# Patient Record
Sex: MALE | Race: WHITE | NOT HISPANIC OR LATINO | Employment: OTHER | URBAN - METROPOLITAN AREA
[De-identification: names, ages, dates, MRNs, and addresses within clinical notes are randomized per-mention and may not be internally consistent; named-entity substitution may affect disease eponyms.]

---

## 2017-01-04 ENCOUNTER — HOSPITAL ENCOUNTER (OUTPATIENT)
Dept: CARDIOLOGY | Facility: HOSPITAL | Age: 82
Setting detail: RECURRING SERIES
Discharge: HOME | End: 2017-01-04

## 2017-01-04 PROCEDURE — 85610 PROTHROMBIN TIME: CPT | Performed by: INTERNAL MEDICINE

## 2017-01-04 PROCEDURE — 36416 COLLJ CAPILLARY BLOOD SPEC: CPT | Performed by: INTERNAL MEDICINE

## 2017-01-24 RX ORDER — ICOSAPENT ETHYL 1000 MG/1
CAPSULE ORAL
Qty: 120 CAPSULE | Refills: 2 | Status: SHIPPED | OUTPATIENT
Start: 2017-01-24 | End: 2017-03-07

## 2017-01-26 ENCOUNTER — TELEPHONE (OUTPATIENT)
Dept: FAMILY MEDICINE CLINIC | Facility: CLINIC | Age: 82
End: 2017-01-26

## 2017-01-26 RX ORDER — METOPROLOL SUCCINATE 25 MG/1
TABLET, EXTENDED RELEASE ORAL
Qty: 30 TABLET | Refills: 3 | Status: SHIPPED | OUTPATIENT
Start: 2017-01-26 | End: 2017-03-27 | Stop reason: SDUPTHER

## 2017-01-26 NOTE — TELEPHONE ENCOUNTER
Patients wife called and said that the vascepa patient was prescribed was $182 he is requesting something else that is cheaper

## 2017-02-13 ENCOUNTER — HOSPITAL ENCOUNTER (OUTPATIENT)
Dept: CARDIOLOGY | Facility: HOSPITAL | Age: 82
Setting detail: RECURRING SERIES
Discharge: HOME | End: 2017-02-13

## 2017-02-13 PROCEDURE — 85610 PROTHROMBIN TIME: CPT

## 2017-02-13 PROCEDURE — 36416 COLLJ CAPILLARY BLOOD SPEC: CPT

## 2017-02-20 RX ORDER — ATORVASTATIN CALCIUM 20 MG/1
TABLET, FILM COATED ORAL
Qty: 90 TABLET | Refills: 0 | Status: SHIPPED | OUTPATIENT
Start: 2017-02-20 | End: 2017-05-19 | Stop reason: SDUPTHER

## 2017-02-22 ENCOUNTER — HOSPITAL ENCOUNTER (OUTPATIENT)
Dept: CARDIOLOGY | Facility: HOSPITAL | Age: 82
Setting detail: RECURRING SERIES
Discharge: HOME | End: 2017-02-22

## 2017-02-22 ENCOUNTER — OFFICE VISIT (OUTPATIENT)
Dept: CARDIOLOGY | Facility: CLINIC | Age: 82
End: 2017-02-22

## 2017-02-22 VITALS
BODY MASS INDEX: 25.34 KG/M2 | RESPIRATION RATE: 20 BRPM | SYSTOLIC BLOOD PRESSURE: 154 MMHG | HEART RATE: 81 BPM | HEIGHT: 71 IN | DIASTOLIC BLOOD PRESSURE: 84 MMHG | WEIGHT: 181 LBS

## 2017-02-22 DIAGNOSIS — I48.20 CHRONIC ATRIAL FIBRILLATION (HCC): Primary | ICD-10-CM

## 2017-02-22 PROCEDURE — 36416 COLLJ CAPILLARY BLOOD SPEC: CPT

## 2017-02-22 PROCEDURE — 93000 ELECTROCARDIOGRAM COMPLETE: CPT | Performed by: INTERNAL MEDICINE

## 2017-02-22 PROCEDURE — 85610 PROTHROMBIN TIME: CPT

## 2017-02-22 PROCEDURE — 99214 OFFICE O/P EST MOD 30 MIN: CPT | Performed by: INTERNAL MEDICINE

## 2017-02-27 RX ORDER — SUB-Q INSULIN DEVICE, 40 UNIT
EACH MISCELLANEOUS
Qty: 1 KIT | Refills: 5 | Status: SHIPPED | OUTPATIENT
Start: 2017-02-27 | End: 2017-04-21 | Stop reason: CLARIF

## 2017-03-01 NOTE — PROGRESS NOTES
Subjective:     Encounter Date:02/22/2017      Patient ID: Betito Sequeira is a 81 y.o. male.    Chief Complaint:  Atrial Fibrillation   Presents for follow-up visit. Symptoms include palpitations. Symptoms are negative for bradycardia, chest pain, hypertension, hypotension, shortness of breath, syncope, tachycardia and weakness. The symptoms have been stable. Past medical history includes atrial fibrillation.   Hypertension   This is a chronic problem. The current episode started more than 1 year ago. The problem is controlled. Associated symptoms include palpitations. Pertinent negatives include no anxiety, chest pain, peripheral edema, PND or shortness of breath.     Patient resents today for reevaluation.  All in all he is actually doing relatively well.  No complaints    Review of Systems   Constitution: Negative for weakness.   Cardiovascular: Positive for palpitations. Negative for chest pain, paroxysmal nocturnal dyspnea and syncope.   Respiratory: Negative for shortness of breath.    All other systems reviewed and are negative.        ECG 12 Lead  Date/Time: 2/22/2017 3:35 PM  Performed by: TANVI PAUL  Authorized by: TANVI PAUL   Comparison: compared with previous ECG from 2/17/2016  Similar to previous ECG  Rhythm: atrial fibrillation  Clinical impression: abnormal ECG               Objective:     Physical Exam   Constitutional: He is oriented to person, place, and time. He appears well-developed.   HENT:   Head: Normocephalic.   Eyes: Conjunctivae are normal.   Neck: Normal range of motion.   Cardiovascular: Normal rate and normal heart sounds.  An irregularly irregular rhythm present.   Metal click noted   Pulmonary/Chest: Breath sounds normal.   Abdominal: Soft. Bowel sounds are normal.   Musculoskeletal: Normal range of motion. He exhibits no edema.   Neurological: He is alert and oriented to person, place, and time.   Skin: Skin is warm and dry.   Psychiatric: He has a normal mood  and affect. His behavior is normal.   Vitals reviewed.      Lab Review:       Assessment:         No diagnosis found.       Plan:          1. History of aortic valve replacement with a metal valve as well as an aortic root construction. His valve sounds good today. His last echocardiogram was in 02/2013.  We discussed the fact that we probably need to check his echo in the next year or so.  2. History of chronic atrial fibrillation. He remains in it. His heart rate is well  controlled. He is anticoagulated with Coumadin because of his valve.   3. History of hypertension. His blood pressure was slightly elevated.  I did tell him to follow closely report back to me.. The patient was told to follow up in approximately a year or sooner, of course, if there are issues or his blood pressure doesn't improve..   4.  History of diabetes Complicating all above.  Atrial Fibrillation and Atrial Flutter  Assessment  • The patient has permanent atrial fibrillation  • This is valvular in etiology  • The patient's CHADS2-VASc score is 4  • A [id number] score of 2 or more is considered a high risk for a thromboembolic event  • Warfarin prescribed    Plan  • Continue in atrial fibrillation with rate control  • Continue warfarin for antithrombotic therapy, bleeding issues discussed

## 2017-03-07 ENCOUNTER — OFFICE VISIT (OUTPATIENT)
Dept: FAMILY MEDICINE CLINIC | Facility: CLINIC | Age: 82
End: 2017-03-07

## 2017-03-07 ENCOUNTER — HOSPITAL ENCOUNTER (OUTPATIENT)
Dept: CARDIOLOGY | Facility: HOSPITAL | Age: 82
Setting detail: RECURRING SERIES
Discharge: HOME | End: 2017-03-07

## 2017-03-07 VITALS
WEIGHT: 181 LBS | DIASTOLIC BLOOD PRESSURE: 74 MMHG | TEMPERATURE: 97.4 F | HEART RATE: 74 BPM | SYSTOLIC BLOOD PRESSURE: 124 MMHG | HEIGHT: 71 IN | BODY MASS INDEX: 25.34 KG/M2 | OXYGEN SATURATION: 98 %

## 2017-03-07 DIAGNOSIS — I10 ESSENTIAL HYPERTENSION: ICD-10-CM

## 2017-03-07 DIAGNOSIS — IMO0002 UNCONTROLLED TYPE 2 DIABETES MELLITUS WITH COMPLICATION, WITH LONG-TERM CURRENT USE OF INSULIN: Primary | ICD-10-CM

## 2017-03-07 DIAGNOSIS — E78.5 HYPERLIPIDEMIA, UNSPECIFIED HYPERLIPIDEMIA TYPE: ICD-10-CM

## 2017-03-07 DIAGNOSIS — N42.9 PROSTATE DISORDER: ICD-10-CM

## 2017-03-07 LAB — HBA1C MFR BLD: 9.2 %

## 2017-03-07 PROCEDURE — 99214 OFFICE O/P EST MOD 30 MIN: CPT | Performed by: NURSE PRACTITIONER

## 2017-03-07 PROCEDURE — 36416 COLLJ CAPILLARY BLOOD SPEC: CPT

## 2017-03-07 PROCEDURE — 85610 PROTHROMBIN TIME: CPT

## 2017-03-07 PROCEDURE — 83036 HEMOGLOBIN GLYCOSYLATED A1C: CPT | Performed by: NURSE PRACTITIONER

## 2017-03-07 RX ORDER — CHLORAL HYDRATE 500 MG
4000 CAPSULE ORAL
COMMUNITY
End: 2022-05-18 | Stop reason: HOSPADM

## 2017-03-07 NOTE — PROGRESS NOTES
"Subjective   Betito Sequeira is a 81 y.o. male.     History of Present Illness   Betito Sequeira 81 y.o. male who presents today for routine follow up check and medication refills.  he has a history of   Patient Active Problem List   Diagnosis   • Atrial fibrillation   • Hypertension   • Cardiomyopathy   • Atopic rhinitis   • Uncontrolled type 2 diabetes mellitus   • Gastroesophageal reflux disease   • Hyperlipidemia   .  Since the last visit, he has overall felt well.  He has Hypertenision and is well controlled on medication, DMII and is here to discuss recent abnormal labs and needing labs rechecked for hyperlipidemia.  he has been compliant with current medications have reviewed them.  The patient denies medication side effects.    Patient brought in glucose readings from home and they are all very well controlled high as all nares 157.  Patient's A1c today is 9.2 believe that he's having spikes during the night.  He is currently doing his clicks with every meal and also using 30 units of Tresiba every night.  He is continuing to snack more frequently than he should then he's not doing any exercise at all.    The following portions of the patient's history were reviewed and updated as appropriate: allergies, current medications, past social history and problem list.    Review of Systems   All other systems reviewed and are negative.      Objective   Visit Vitals   • /74 (BP Location: Left arm, Patient Position: Sitting)   • Pulse 74   • Temp 97.4 °F (36.3 °C)   • Ht 71\" (180.3 cm)   • Wt 181 lb (82.1 kg)   • SpO2 98%   • BMI 25.24 kg/m2     Physical Exam   Constitutional: He is oriented to person, place, and time. Vital signs are normal. He appears well-developed and well-nourished. No distress.   HENT:   Head: Normocephalic.   Cardiovascular: Normal rate, regular rhythm and normal heart sounds.    Pulmonary/Chest: Effort normal and breath sounds normal.   Neurological: He is alert and oriented to person, " place, and time. Gait normal.   Psychiatric: He has a normal mood and affect. His behavior is normal. Judgment and thought content normal.   Vitals reviewed.      Assessment/Plan   Problem List Items Addressed This Visit        Cardiovascular and Mediastinum    Hypertension    Relevant Orders    MicroAlbumin, Urine, Random    Hyperlipidemia    Relevant Orders    CBC & Differential    Comprehensive Metabolic Panel    Lipid Panel With LDL / HDL Ratio       Endocrine    Uncontrolled type 2 diabetes mellitus - Primary    Relevant Orders    POC Glycosylated Hemoglobin (Hb A1C)    C-Peptide      Other Visit Diagnoses     Prostate disorder        Relevant Orders    PSA           increase Tresiba to 40units a night  Cont after labs   recheck a C-peptide today along with some other labs depending upon the C-peptide results I will either add back in oral diabetic medications or send him to an endocrinologist.  I can't seem to get him to understand the severity of his diabetes and the need to get his A1c down to 7.  His wife is in the office visit with him and she understands but she works all day and he is home alone therefore not eating well and not exercising as he should be.   Call [phone] with results

## 2017-03-08 LAB
ALBUMIN SERPL-MCNC: 4 G/DL (ref 3.5–5.2)
ALBUMIN/GLOB SERPL: 1.4 G/DL
ALP SERPL-CCNC: 99 U/L (ref 39–117)
ALT SERPL-CCNC: 37 U/L (ref 1–41)
AST SERPL-CCNC: 23 U/L (ref 1–40)
BASOPHILS # BLD AUTO: 0.02 10*3/MM3 (ref 0–0.2)
BASOPHILS NFR BLD AUTO: 0.4 % (ref 0–1.5)
BILIRUB SERPL-MCNC: 0.6 MG/DL (ref 0.1–1.2)
BUN SERPL-MCNC: 26 MG/DL (ref 8–23)
BUN/CREAT SERPL: 16.8 (ref 7–25)
C PEPTIDE SERPL-MCNC: 1.3 NG/ML (ref 1.1–4.4)
CALCIUM SERPL-MCNC: 9.5 MG/DL (ref 8.6–10.5)
CHLORIDE SERPL-SCNC: 103 MMOL/L (ref 98–107)
CHOLEST SERPL-MCNC: 167 MG/DL (ref 0–200)
CO2 SERPL-SCNC: 25.1 MMOL/L (ref 22–29)
CREAT SERPL-MCNC: 1.55 MG/DL (ref 0.76–1.27)
EOSINOPHIL # BLD AUTO: 0.13 10*3/MM3 (ref 0–0.7)
EOSINOPHIL NFR BLD AUTO: 2.8 % (ref 0.3–6.2)
ERYTHROCYTE [DISTWIDTH] IN BLOOD BY AUTOMATED COUNT: 15.6 % (ref 11.5–14.5)
GLOBULIN SER CALC-MCNC: 2.8 GM/DL
GLUCOSE SERPL-MCNC: 102 MG/DL (ref 65–99)
HCT VFR BLD AUTO: 47.9 % (ref 40.4–52.2)
HDLC SERPL-MCNC: 34 MG/DL (ref 40–60)
HGB BLD-MCNC: 15.4 G/DL (ref 13.7–17.6)
IMM GRANULOCYTES # BLD: 0 10*3/MM3 (ref 0–0.03)
IMM GRANULOCYTES NFR BLD: 0 % (ref 0–0.5)
LDLC SERPL CALC-MCNC: 97 MG/DL (ref 0–100)
LDLC/HDLC SERPL: 2.86 {RATIO}
LYMPHOCYTES # BLD AUTO: 1.11 10*3/MM3 (ref 0.9–4.8)
LYMPHOCYTES NFR BLD AUTO: 23.5 % (ref 19.6–45.3)
MCH RBC QN AUTO: 27.4 PG (ref 27–32.7)
MCHC RBC AUTO-ENTMCNC: 32.2 G/DL (ref 32.6–36.4)
MCV RBC AUTO: 85.1 FL (ref 79.8–96.2)
MICROALBUMIN UR-MCNC: 77.6 UG/ML
MONOCYTES # BLD AUTO: 0.28 10*3/MM3 (ref 0.2–1.2)
MONOCYTES NFR BLD AUTO: 5.9 % (ref 5–12)
NEUTROPHILS # BLD AUTO: 3.18 10*3/MM3 (ref 1.9–8.1)
NEUTROPHILS NFR BLD AUTO: 67.4 % (ref 42.7–76)
NRBC BLD AUTO-RTO: 0 /100 WBC (ref 0–0)
PLATELET # BLD AUTO: 103 10*3/MM3 (ref 140–500)
POTASSIUM SERPL-SCNC: 4.4 MMOL/L (ref 3.5–5.2)
PROT SERPL-MCNC: 6.8 G/DL (ref 6–8.5)
PSA SERPL-MCNC: 0.05 NG/ML (ref 0–4)
RBC # BLD AUTO: 5.63 10*6/MM3 (ref 4.6–6)
SODIUM SERPL-SCNC: 143 MMOL/L (ref 136–145)
TRIGL SERPL-MCNC: 178 MG/DL (ref 0–150)
VLDLC SERPL CALC-MCNC: 35.6 MG/DL (ref 5–40)
WBC # BLD AUTO: 4.72 10*3/MM3 (ref 4.5–10.7)

## 2017-03-09 DIAGNOSIS — IMO0002 UNCONTROLLED TYPE 2 DIABETES MELLITUS WITH COMPLICATION, WITH LONG-TERM CURRENT USE OF INSULIN: Primary | ICD-10-CM

## 2017-03-15 ENCOUNTER — TELEPHONE (OUTPATIENT)
Dept: FAMILY MEDICINE CLINIC | Facility: CLINIC | Age: 82
End: 2017-03-15

## 2017-03-15 RX ORDER — WARFARIN SODIUM 5 MG/1
TABLET ORAL
Qty: 135 TABLET | Refills: 0 | Status: SHIPPED | OUTPATIENT
Start: 2017-03-15 | End: 2017-07-05 | Stop reason: SDUPTHER

## 2017-03-15 NOTE — TELEPHONE ENCOUNTER
Pt called to tell me that she hadn't heard from endocrinology yet for appointment.  Assured her that they will call for an appointment

## 2017-03-20 RX ORDER — DIGOXIN 125 MCG
TABLET ORAL
Qty: 30 TABLET | Refills: 4 | Status: SHIPPED | OUTPATIENT
Start: 2017-03-20 | End: 2017-04-27 | Stop reason: SDUPTHER

## 2017-03-27 RX ORDER — METOPROLOL SUCCINATE 25 MG/1
25 TABLET, EXTENDED RELEASE ORAL DAILY
Qty: 30 TABLET | Refills: 3 | Status: SHIPPED | OUTPATIENT
Start: 2017-03-27 | End: 2017-03-29 | Stop reason: SDUPTHER

## 2017-03-29 RX ORDER — METOPROLOL SUCCINATE 25 MG/1
25 TABLET, EXTENDED RELEASE ORAL DAILY
Qty: 90 TABLET | Refills: 0 | Status: SHIPPED | OUTPATIENT
Start: 2017-03-29 | End: 2019-05-01

## 2017-03-30 ENCOUNTER — APPOINTMENT (OUTPATIENT)
Dept: GENERAL RADIOLOGY | Facility: HOSPITAL | Age: 82
End: 2017-03-30

## 2017-03-30 ENCOUNTER — HOSPITAL ENCOUNTER (EMERGENCY)
Facility: HOSPITAL | Age: 82
Discharge: HOME | End: 2017-03-30
Attending: EMERGENCY MEDICINE | Admitting: EMERGENCY MEDICINE

## 2017-03-30 ENCOUNTER — APPOINTMENT (OUTPATIENT)
Dept: CARDIOLOGY | Facility: HOSPITAL | Age: 82
End: 2017-03-30
Attending: EMERGENCY MEDICINE

## 2017-03-30 VITALS
HEIGHT: 72 IN | DIASTOLIC BLOOD PRESSURE: 87 MMHG | RESPIRATION RATE: 14 BRPM | WEIGHT: 180 LBS | TEMPERATURE: 97.5 F | HEART RATE: 61 BPM | OXYGEN SATURATION: 100 % | SYSTOLIC BLOOD PRESSURE: 151 MMHG | BODY MASS INDEX: 24.38 KG/M2

## 2017-03-30 DIAGNOSIS — R60.0 PEDAL EDEMA: Primary | ICD-10-CM

## 2017-03-30 LAB
ALBUMIN SERPL-MCNC: 4 G/DL (ref 3.5–5.2)
ALBUMIN/GLOB SERPL: 1.3 G/DL
ALP SERPL-CCNC: 102 U/L (ref 39–117)
ALT SERPL W P-5'-P-CCNC: 31 U/L (ref 1–41)
ANION GAP SERPL CALCULATED.3IONS-SCNC: 12.4 MMOL/L
AST SERPL-CCNC: 24 U/L (ref 1–40)
BASOPHILS # BLD AUTO: 0.02 10*3/MM3 (ref 0–0.2)
BASOPHILS NFR BLD AUTO: 0.5 % (ref 0–1.5)
BH CV LOWER VASCULAR LEFT COMMON FEMORAL AUGMENT: NORMAL
BH CV LOWER VASCULAR LEFT COMMON FEMORAL COMPETENT: NORMAL
BH CV LOWER VASCULAR LEFT COMMON FEMORAL COMPRESS: NORMAL
BH CV LOWER VASCULAR LEFT COMMON FEMORAL PHASIC: NORMAL
BH CV LOWER VASCULAR LEFT COMMON FEMORAL SPONT: NORMAL
BH CV LOWER VASCULAR LEFT DISTAL FEMORAL COMPRESS: NORMAL
BH CV LOWER VASCULAR LEFT GASTRONEMIUS COMPRESS: NORMAL
BH CV LOWER VASCULAR LEFT GREATER SAPH AK COMPRESS: NORMAL
BH CV LOWER VASCULAR LEFT GREATER SAPH BK COMPRESS: NORMAL
BH CV LOWER VASCULAR LEFT LESSER SAPH COMPRESS: NORMAL
BH CV LOWER VASCULAR LEFT MID FEMORAL AUGMENT: NORMAL
BH CV LOWER VASCULAR LEFT MID FEMORAL COMPETENT: NORMAL
BH CV LOWER VASCULAR LEFT MID FEMORAL COMPRESS: NORMAL
BH CV LOWER VASCULAR LEFT MID FEMORAL PHASIC: NORMAL
BH CV LOWER VASCULAR LEFT MID FEMORAL SPONT: NORMAL
BH CV LOWER VASCULAR LEFT PERONEAL COMPRESS: NORMAL
BH CV LOWER VASCULAR LEFT POPLITEAL AUGMENT: NORMAL
BH CV LOWER VASCULAR LEFT POPLITEAL COMPETENT: NORMAL
BH CV LOWER VASCULAR LEFT POPLITEAL COMPRESS: NORMAL
BH CV LOWER VASCULAR LEFT POPLITEAL PHASIC: NORMAL
BH CV LOWER VASCULAR LEFT POPLITEAL SPONT: NORMAL
BH CV LOWER VASCULAR LEFT POSTERIOR TIBIAL COMPRESS: NORMAL
BH CV LOWER VASCULAR LEFT PROXIMAL FEMORAL COMPRESS: NORMAL
BH CV LOWER VASCULAR LEFT SAPHENOFEMORAL JUNCTION AUGMENT: NORMAL
BH CV LOWER VASCULAR LEFT SAPHENOFEMORAL JUNCTION COMPETENT: NORMAL
BH CV LOWER VASCULAR LEFT SAPHENOFEMORAL JUNCTION COMPRESS: NORMAL
BH CV LOWER VASCULAR LEFT SAPHENOFEMORAL JUNCTION PHASIC: NORMAL
BH CV LOWER VASCULAR LEFT SAPHENOFEMORAL JUNCTION SPONT: NORMAL
BH CV LOWER VASCULAR RIGHT COMMON FEMORAL AUGMENT: NORMAL
BH CV LOWER VASCULAR RIGHT COMMON FEMORAL COMPETENT: NORMAL
BH CV LOWER VASCULAR RIGHT COMMON FEMORAL COMPRESS: NORMAL
BH CV LOWER VASCULAR RIGHT COMMON FEMORAL PHASIC: NORMAL
BH CV LOWER VASCULAR RIGHT COMMON FEMORAL SPONT: NORMAL
BH CV LOWER VASCULAR RIGHT DISTAL FEMORAL COMPRESS: NORMAL
BH CV LOWER VASCULAR RIGHT GASTRONEMIUS COMPRESS: NORMAL
BH CV LOWER VASCULAR RIGHT GREATER SAPH AK COMPRESS: NORMAL
BH CV LOWER VASCULAR RIGHT GREATER SAPH BK COMPRESS: NORMAL
BH CV LOWER VASCULAR RIGHT LESSER SAPH COMPRESS: NORMAL
BH CV LOWER VASCULAR RIGHT MID FEMORAL AUGMENT: NORMAL
BH CV LOWER VASCULAR RIGHT MID FEMORAL COMPETENT: NORMAL
BH CV LOWER VASCULAR RIGHT MID FEMORAL COMPRESS: NORMAL
BH CV LOWER VASCULAR RIGHT MID FEMORAL PHASIC: NORMAL
BH CV LOWER VASCULAR RIGHT MID FEMORAL SPONT: NORMAL
BH CV LOWER VASCULAR RIGHT PERONEAL COMPRESS: NORMAL
BH CV LOWER VASCULAR RIGHT POPLITEAL AUGMENT: NORMAL
BH CV LOWER VASCULAR RIGHT POPLITEAL COMPETENT: NORMAL
BH CV LOWER VASCULAR RIGHT POPLITEAL COMPRESS: NORMAL
BH CV LOWER VASCULAR RIGHT POPLITEAL PHASIC: NORMAL
BH CV LOWER VASCULAR RIGHT POPLITEAL SPONT: NORMAL
BH CV LOWER VASCULAR RIGHT POSTERIOR TIBIAL COMPRESS: NORMAL
BH CV LOWER VASCULAR RIGHT PROXIMAL FEMORAL COMPRESS: NORMAL
BH CV LOWER VASCULAR RIGHT SAPHENOFEMORAL JUNCTION AUGMENT: NORMAL
BH CV LOWER VASCULAR RIGHT SAPHENOFEMORAL JUNCTION COMPETENT: NORMAL
BH CV LOWER VASCULAR RIGHT SAPHENOFEMORAL JUNCTION COMPRESS: NORMAL
BH CV LOWER VASCULAR RIGHT SAPHENOFEMORAL JUNCTION PHASIC: NORMAL
BH CV LOWER VASCULAR RIGHT SAPHENOFEMORAL JUNCTION SPONT: NORMAL
BILIRUB SERPL-MCNC: 0.7 MG/DL (ref 0.1–1.2)
BUN BLD-MCNC: 26 MG/DL (ref 8–23)
BUN/CREAT SERPL: 17 (ref 7–25)
CALCIUM SPEC-SCNC: 9.5 MG/DL (ref 8.6–10.5)
CHLORIDE SERPL-SCNC: 105 MMOL/L (ref 98–107)
CO2 SERPL-SCNC: 25.6 MMOL/L (ref 22–29)
CREAT BLD-MCNC: 1.53 MG/DL (ref 0.76–1.27)
DEPRECATED RDW RBC AUTO: 48.3 FL (ref 37–54)
DIGOXIN SERPL-MCNC: 1 NG/ML (ref 0.6–1.2)
EOSINOPHIL # BLD AUTO: 0.16 10*3/MM3 (ref 0–0.7)
EOSINOPHIL NFR BLD AUTO: 3.8 % (ref 0.3–6.2)
ERYTHROCYTE [DISTWIDTH] IN BLOOD BY AUTOMATED COUNT: 15.5 % (ref 11.5–14.5)
GFR SERPL CREATININE-BSD FRML MDRD: 44 ML/MIN/1.73
GLOBULIN UR ELPH-MCNC: 3.1 GM/DL
GLUCOSE BLD-MCNC: 112 MG/DL (ref 65–99)
HCT VFR BLD AUTO: 46.7 % (ref 40.4–52.2)
HGB BLD-MCNC: 14.9 G/DL (ref 13.7–17.6)
IMM GRANULOCYTES # BLD: 0 10*3/MM3 (ref 0–0.03)
IMM GRANULOCYTES NFR BLD: 0 % (ref 0–0.5)
INR PPP: 2.66 (ref 0.9–1.1)
LYMPHOCYTES # BLD AUTO: 0.94 10*3/MM3 (ref 0.9–4.8)
LYMPHOCYTES NFR BLD AUTO: 22.3 % (ref 19.6–45.3)
MCH RBC QN AUTO: 27.1 PG (ref 27–32.7)
MCHC RBC AUTO-ENTMCNC: 31.9 G/DL (ref 32.6–36.4)
MCV RBC AUTO: 85.1 FL (ref 79.8–96.2)
MONOCYTES # BLD AUTO: 0.34 10*3/MM3 (ref 0.2–1.2)
MONOCYTES NFR BLD AUTO: 8.1 % (ref 5–12)
NEUTROPHILS # BLD AUTO: 2.76 10*3/MM3 (ref 1.9–8.1)
NEUTROPHILS NFR BLD AUTO: 65.3 % (ref 42.7–76)
NRBC BLD MANUAL-RTO: 0 /100 WBC (ref 0–0)
NT-PROBNP SERPL-MCNC: 889.7 PG/ML (ref 0–1800)
PLATELET # BLD AUTO: 107 10*3/MM3 (ref 140–500)
PMV BLD AUTO: 12.5 FL (ref 6–12)
POTASSIUM BLD-SCNC: 4.5 MMOL/L (ref 3.5–5.2)
PROT SERPL-MCNC: 7.1 G/DL (ref 6–8.5)
PROTHROMBIN TIME: 27.5 SECONDS (ref 11.7–14.2)
RBC # BLD AUTO: 5.49 10*6/MM3 (ref 4.6–6)
SODIUM BLD-SCNC: 143 MMOL/L (ref 136–145)
TROPONIN T SERPL-MCNC: <0.01 NG/ML (ref 0–0.03)
WBC NRBC COR # BLD: 4.22 10*3/MM3 (ref 4.5–10.7)

## 2017-03-30 PROCEDURE — 80162 ASSAY OF DIGOXIN TOTAL: CPT | Performed by: EMERGENCY MEDICINE

## 2017-03-30 PROCEDURE — 93005 ELECTROCARDIOGRAM TRACING: CPT | Performed by: EMERGENCY MEDICINE

## 2017-03-30 PROCEDURE — 71020 HC CHEST PA AND LATERAL: CPT

## 2017-03-30 PROCEDURE — 80053 COMPREHEN METABOLIC PANEL: CPT | Performed by: EMERGENCY MEDICINE

## 2017-03-30 PROCEDURE — 85025 COMPLETE CBC W/AUTO DIFF WBC: CPT | Performed by: EMERGENCY MEDICINE

## 2017-03-30 PROCEDURE — 84484 ASSAY OF TROPONIN QUANT: CPT | Performed by: EMERGENCY MEDICINE

## 2017-03-30 PROCEDURE — 99283 EMERGENCY DEPT VISIT LOW MDM: CPT

## 2017-03-30 PROCEDURE — 93010 ELECTROCARDIOGRAM REPORT: CPT | Performed by: INTERNAL MEDICINE

## 2017-03-30 PROCEDURE — 83880 ASSAY OF NATRIURETIC PEPTIDE: CPT | Performed by: EMERGENCY MEDICINE

## 2017-03-30 PROCEDURE — 93970 EXTREMITY STUDY: CPT

## 2017-03-30 PROCEDURE — 85610 PROTHROMBIN TIME: CPT | Performed by: EMERGENCY MEDICINE

## 2017-03-30 RX ORDER — MAGNESIUM OXIDE 400 MG/1
400 TABLET ORAL 2 TIMES DAILY
COMMUNITY

## 2017-03-30 RX ORDER — FUROSEMIDE 20 MG/1
20 TABLET ORAL DAILY
Qty: 5 TABLET | Refills: 0 | Status: SHIPPED | OUTPATIENT
Start: 2017-03-30 | End: 2017-05-15 | Stop reason: SDUPTHER

## 2017-03-30 RX ORDER — SODIUM CHLORIDE 0.9 % (FLUSH) 0.9 %
10 SYRINGE (ML) INJECTION AS NEEDED
Status: DISCONTINUED | OUTPATIENT
Start: 2017-03-30 | End: 2017-03-30 | Stop reason: HOSPADM

## 2017-03-31 ENCOUNTER — TELEPHONE (OUTPATIENT)
Dept: SOCIAL WORK | Facility: HOSPITAL | Age: 82
End: 2017-03-31

## 2017-03-31 ENCOUNTER — HOSPITAL ENCOUNTER (OUTPATIENT)
Dept: CARDIOLOGY | Facility: HOSPITAL | Age: 82
Setting detail: RECURRING SERIES
Discharge: HOME | End: 2017-03-31

## 2017-03-31 ENCOUNTER — OFFICE VISIT (OUTPATIENT)
Dept: ENDOCRINOLOGY | Age: 82
End: 2017-03-31

## 2017-03-31 VITALS
DIASTOLIC BLOOD PRESSURE: 50 MMHG | WEIGHT: 183.4 LBS | HEIGHT: 72 IN | SYSTOLIC BLOOD PRESSURE: 110 MMHG | BODY MASS INDEX: 24.84 KG/M2

## 2017-03-31 DIAGNOSIS — N28.9 RENAL INSUFFICIENCY: ICD-10-CM

## 2017-03-31 DIAGNOSIS — I10 ESSENTIAL HYPERTENSION: ICD-10-CM

## 2017-03-31 DIAGNOSIS — E78.5 HYPERLIPIDEMIA, UNSPECIFIED HYPERLIPIDEMIA TYPE: ICD-10-CM

## 2017-03-31 DIAGNOSIS — IMO0002 UNCONTROLLED TYPE 2 DIABETES MELLITUS WITH COMPLICATION, WITH LONG-TERM CURRENT USE OF INSULIN: Primary | ICD-10-CM

## 2017-03-31 PROCEDURE — 36416 COLLJ CAPILLARY BLOOD SPEC: CPT

## 2017-03-31 PROCEDURE — 85610 PROTHROMBIN TIME: CPT

## 2017-03-31 PROCEDURE — 99204 OFFICE O/P NEW MOD 45 MIN: CPT | Performed by: NURSE PRACTITIONER

## 2017-03-31 NOTE — TELEPHONE ENCOUNTER
ED follow-up phone call. States he is taking prescribed Lasix, and has less pedal edema. No questions/concerns

## 2017-03-31 NOTE — PATIENT INSTRUCTIONS
Glucagon kit sent to pharmacy to use in case of emergency  Labs pending  VGO 40  Decrease tresiba to 20units at night   Closely monitor blood sugar due to changes in insulin doses

## 2017-03-31 NOTE — PROGRESS NOTES
"Clark Sequiera is a 81 y.o. male is being seen for consultation today at the request of LIZA Trinh  Chief Complaint   Patient presents with   • Diabetes     recent labs (OUTSIDE); brought BG logs; test BG bid    • Hypertension     Went to [facility] ER yesterday due to swelling in both of his legs   • Hyperlipidemia     /50  Ht 72\" (182.9 cm)  Wt 183 lb 6.4 oz (83.2 kg)  BMI 24.87 kg/m2    Current Outpatient Prescriptions:   •  ACCU-CHEK IFEANYI PLUS test strip, CHECK BLOOD SUGAR THREE TIMES A DAY TO FOUR TIMES A DAY, Disp: 100 each, Rfl: 4  •  atorvastatin (LIPITOR) 20 MG tablet, TAKE ONE TABLET BY MOUTH DAILY, Disp: 90 tablet, Rfl: 0  •  COUMADIN 5 MG tablet, Take 1.5 tablets by mouth daily or as directed., Disp: 135 tablet, Rfl: 0  •  digoxin (LANOXIN) 125 MCG tablet, TAKE ONE TABLET BY MOUTH DAILY, Disp: 30 tablet, Rfl: 4  •  diphenhydrAMINE (BENADRYL) 25 MG tablet, Take 100 mg by mouth Daily., Disp: , Rfl:   •  fluticasone (FLOVENT DISKUS) 50 MCG/BLIST diskus inhaler, Inhale 1 puff 2 (two) times a day., Disp: , Rfl:   •  furosemide (LASIX) 20 MG tablet, Take 1 tablet by mouth Daily., Disp: 5 tablet, Rfl: 0  •  glucose blood test strip, Accu-Chek Ifaenyi Plus In Vitro Strip; Patient Sig: Accu-Chek Ifeanyi Plus In Vitro Strip CHECK BLOOD SUGAR THREE TIMES A DAY TO FOUR TIMES A DAY; 100; 5; 14-Sep-2015; Active, Disp: , Rfl:   •  guaiFENesin (MUCINEX) 600 MG 12 hr tablet, Take  by mouth every 12 (twelve) hours., Disp: , Rfl:   •  HUMALOG 100 UNIT/ML injection, INJECT 76 UNITS WITH EACH MEAL VIA VGO DAILY AS DIRECTED, Disp: 70 mL, Rfl: 3  •  Insulin Degludec (TRESIBA FLEXTOUCH SC), Inject  under the skin., Disp: , Rfl:   •  Insulin Disposable Pump (V-GO 30) kit, USE AS DIRECTED THREE TIMES A DAY, Disp: 1 kit, Rfl: 5  •  magnesium oxide (MAG-OX) 400 MG tablet, Take 400 mg by mouth Daily., Disp: , Rfl:   •  metoprolol succinate XL (TOPROL-XL) 25 MG 24 hr tablet, Take 1 tablet by mouth Daily., Disp: " 90 tablet, Rfl: 0  •  Omega-3 Fatty Acids (FISH OIL) 1000 MG capsule capsule, Take  by mouth Daily With Breakfast., Disp: , Rfl:   •  sodium bicarbonate 650 MG tablet, Take 3 tablets by mouth 2 (two) times a day., Disp: , Rfl:   No current facility-administered medications for this visit.   Allergies   Allergen Reactions   • Other      Grass, ragweed   • Penicillins    • Percocet  [Oxycodone-Acetaminophen]      Family History   Problem Relation Age of Onset   • Cancer Mother      colon   • Cancer Brother      colon     Social History     Social History   • Marital status:      Spouse name: N/A   • Number of children: N/A   • Years of education: N/A     Social History Main Topics   • Smoking status: Former Smoker   • Smokeless tobacco: Former User      Comment: caffeine use   • Alcohol use Yes      Comment: occasional   • Drug use: No   • Sexual activity: Not Asked     Other Topics Concern   • None     Social History Narrative     Past Medical History:   Diagnosis Date   • Allergic rhinitis    • Aortic valve insufficiency    • Ascending aortic aneurysm    • Atrial fibrillation    • Bacteremia    • Calcific aortic stenosis of bicuspid valve    • Cardiac arrest    • Cardiomyopathy    • Contact dermatitis due to poison ivy    • Diabetes mellitus    • Elbow fracture    • Esophageal reflux    • GERD (gastroesophageal reflux disease)    • Head injury    • Hyperglycemia    • Hyperlipidemia    • Hypertension    • Kidney carcinoma    • Nasal congestion    • Nasal drainage    • Nonischemic cardiomyopathy    • Pleural effusion on left    • Renal insufficiency syndrome    • Renal oncocytoma    • Seasonal allergic reaction    • Sinusitis    • Syncope    • Type 2 diabetes mellitus     uncontrolled   • Vision changes    • Visual field defect      Past Surgical History:   Procedure Laterality Date   • AORTIC VALVE REPAIR/REPLACEMENT     • ASCENDING AORTIC ANEURYSM REPAIR W/ MECHANICAL AORTIC VALVE REPLACEMENT     •  NEPHRECTOMY     • OTHER SURGICAL HISTORY      elbow surgery   • PROSTATE SURGERY     • THORACENTESIS Left     diagnostic         History of Present Illness   Encounter Diagnoses   Name Primary?   • Essential hypertension    • Hyperlipidemia, unspecified hyperlipidemia type    • Uncontrolled type 2 diabetes mellitus with complication, with long-term current use of insulin Yes   • Renal insufficiency      This is a 81-year-old male patient being seen today for an initial evaluation for uncontrolled type 2 diabetes.  He is here today in the office with his wife.  His most recent labs were reviewed and his A1c is 9.2. He was referred here by his PCP because his diabetes is uncontrolled and she thinks he could possibly be transitioning to type 1 diabetes.  His wife is wanting to discuss the possibility of an insulin pump.  The patient is very hesitant regarding insulin pumps.  He was diagnosed with diabetes around 2008 where he was initially controlled with diet.  Since his diagnosis he has tried oral medications, GlP1, and insulin injections. He is currently on Vgo 30 and tresiba 40units qhs. He tests his BS twice daily and his blood sugar log was reviewed today. He states that he wakes up in the middle of the night to go to the bathroom and checks his BS which he states is high and he gives himself more insulin. He and his wife have complaints about the Vgo stating they will be out to lunch and he will run out of insulin and they need to run home to get more. He denies any recent hypoglycemia but he did have a blood sugar of 27 about a year ago and was hospitalized. He knows how to appropriately treat hypoglycemia. He has a significant family history of diabetes including his mother and his sister however he does not know whether they have type I or type 2. but they are both on insulin. He has history of renal carcinoma and had a kidney removed. He sees Dr Degroot as his nephrologist. He checks his feet daily and denies  any wounds. He gets routine eye exams. He went to [facility] ER yesterday for leg swelling and had a complete workup and was told his heart is stable, but he is retaining fluid and was started on lasix. He has an artificial valve and artery. We had a long discussion regarding the different options for insulin pumps and he was provided information on them. He is on atorvastatin 20mg and his most recent lipid panel was reviewed and stable.       The following portions of the patient's history were reviewed and updated as appropriate: allergies, current medications, past family history, past medical history, past social history, past surgical history and problem list.    Review of Systems   Constitutional: Negative for fatigue.   HENT: Negative for trouble swallowing.    Eyes: Negative for visual disturbance.   Respiratory: Negative for shortness of breath.    Cardiovascular: Positive for leg swelling.   Gastrointestinal: Negative for nausea.   Endocrine: Negative for polyuria.   Musculoskeletal: Negative for joint swelling.   Skin: Negative for wound.   Allergic/Immunologic: Negative for immunocompromised state.   Neurological: Negative for numbness.   Hematological: Negative for adenopathy.   Psychiatric/Behavioral: The patient is not nervous/anxious.        Objective   Physical Exam   Constitutional: He is oriented to person, place, and time. He appears well-developed and well-nourished. No distress.   HENT:   Head: Normocephalic and atraumatic.   Right Ear: External ear normal.   Left Ear: External ear normal.   Nose: Nose normal.   Eyes: Pupils are equal, round, and reactive to light. Right eye exhibits no discharge. Left eye exhibits no discharge.   Neck: Normal range of motion. Neck supple. Carotid bruit is not present. No tracheal deviation, no edema and no erythema present. No thyromegaly present.   Cardiovascular: Normal rate, regular rhythm and intact distal pulses.  Exam reveals no gallop and no friction rub.     No murmur heard.  Artificial valve   Pulmonary/Chest: Effort normal and breath sounds normal. No respiratory distress. He has no wheezes. He has no rales.   Abdominal: Soft. Bowel sounds are normal. He exhibits no distension. There is no tenderness.   Musculoskeletal: Normal range of motion. He exhibits no edema or deformity.   Lymphadenopathy:     He has no cervical adenopathy.   Neurological: He is alert and oriented to person, place, and time. Coordination normal.   Skin: Skin is warm and dry. No rash noted. He is not diaphoretic. No erythema. No pallor.   Psychiatric: He has a normal mood and affect. His behavior is normal. Judgment and thought content normal.   Nursing note and vitals reviewed.        Assessment/Plan   Encounter Diagnoses   Name Primary?   • Essential hypertension    • Hyperlipidemia, unspecified hyperlipidemia type    • Uncontrolled type 2 diabetes mellitus with complication, with long-term current use of insulin Yes   • Renal insufficiency      In summary, patient was seen and examined.  His recent labs were reviewed and he was provided a copy.  He will have additional labs done today to determine whether he is truly type I or type II diabetic.  We had a long discussion regarding the different possibilities for insulin pumps and he was provided information on these.  After our discussion, he does not think an insulin pump is the right thing for him at this time but he wants to do his own research on them and will notify the office if he decides he wants a pump.  He is most hesitant regarding insulin pumps because he is not wanting to have to count carbohydrates. He was on VGO30 and tresiba 40 units qhs and at the office visit he was given VGO40 to increase basal  and decreasing the need for Tresiba. He was educated on the higher dose of insulin he will be getting and was instructed to closely monitor his blood sugars. He is to not take Tresiba when he first starts the VGO40 to prevent  hypoglycemia. He was educated that once he determines how his blood sugars are doing on VGO40, he can add back in the tresiba gradually depending on his BS and will probably not need more than half his current dose. He was instructed to notify the office if he has low blood sugars or if his blood sugars remain elevated so that further adjustments can be made. We reviewed how to appropriately treat hypoglycemia and a glucagon kit was sent to his pharmacy in case of emergency and he and his wife were provided with instructions on how to use it. He is to follow-up in 4 months with labs prior.  I've asked that he contact the office with any questions or concerns prior to his next visit.          Current Outpatient Prescriptions:   •  ACCU-CHEK IFEANYI PLUS test strip, CHECK BLOOD SUGAR THREE TIMES A DAY TO FOUR TIMES A DAY, Disp: 100 each, Rfl: 4  •  atorvastatin (LIPITOR) 20 MG tablet, TAKE ONE TABLET BY MOUTH DAILY, Disp: 90 tablet, Rfl: 0  •  COUMADIN 5 MG tablet, Take 1.5 tablets by mouth daily or as directed., Disp: 135 tablet, Rfl: 0  •  digoxin (LANOXIN) 125 MCG tablet, TAKE ONE TABLET BY MOUTH DAILY, Disp: 30 tablet, Rfl: 4  •  diphenhydrAMINE (BENADRYL) 25 MG tablet, Take 100 mg by mouth Daily., Disp: , Rfl:   •  fluticasone (FLOVENT DISKUS) 50 MCG/BLIST diskus inhaler, Inhale 1 puff 2 (two) times a day., Disp: , Rfl:   •  furosemide (LASIX) 20 MG tablet, Take 1 tablet by mouth Daily., Disp: 5 tablet, Rfl: 0  •  glucose blood test strip, Accu-Chek Ifeanyi Plus In Vitro Strip; Patient Sig: Accu-Chek Ifeanyi Plus In Vitro Strip CHECK BLOOD SUGAR THREE TIMES A DAY TO FOUR TIMES A DAY; 100; 5; 14-Sep-2015; Active, Disp: , Rfl:   •  guaiFENesin (MUCINEX) 600 MG 12 hr tablet, Take  by mouth every 12 (twelve) hours., Disp: , Rfl:   •  HUMALOG 100 UNIT/ML injection, INJECT 76 UNITS WITH EACH MEAL VIA VGO DAILY AS DIRECTED, Disp: 70 mL, Rfl: 3  •  Insulin Degludec (TRESIBA FLEXTOUCH SC), Inject  under the skin., Disp: , Rfl:    •  Insulin Disposable Pump (V-GO 30) kit, USE AS DIRECTED THREE TIMES A DAY, Disp: 1 kit, Rfl: 5  •  magnesium oxide (MAG-OX) 400 MG tablet, Take 400 mg by mouth Daily., Disp: , Rfl:   •  metoprolol succinate XL (TOPROL-XL) 25 MG 24 hr tablet, Take 1 tablet by mouth Daily., Disp: 90 tablet, Rfl: 0  •  Omega-3 Fatty Acids (FISH OIL) 1000 MG capsule capsule, Take  by mouth Daily With Breakfast., Disp: , Rfl:   •  sodium bicarbonate 650 MG tablet, Take 3 tablets by mouth 2 (two) times a day., Disp: , Rfl:   •  glucagon (GLUCAGON EMERGENCY) 1 MG injection, Inject 1 mg under the skin 1 (One) Time As Needed for Low Blood Sugar for up to 1 dose., Disp: 1 kit, Rfl: 5  No current facility-administered medications for this visit.

## 2017-04-02 LAB
CONV COMMENT: ABNORMAL
SHBG SERPL-SCNC: 46 NMOL/L (ref 19.3–76.4)
TESTOST FREE SERPL-MCNC: 5.5 PG/ML (ref 6.6–18.1)
TESTOST SERPL-MCNC: 301 NG/DL (ref 348–1197)

## 2017-04-03 DIAGNOSIS — R79.89 LOW TESTOSTERONE: Primary | ICD-10-CM

## 2017-04-06 LAB
C PEPTIDE SERPL-MCNC: 6.4 NG/ML (ref 1.1–4.4)
GAD65 AB SER IA-ACNC: 35 U/ML (ref 0–5)
INSULIN AB SER-ACNC: 5.1 UU/ML

## 2017-04-07 DIAGNOSIS — R79.89 LOW TESTOSTERONE: Primary | ICD-10-CM

## 2017-04-21 RX ORDER — SUB-Q INSULIN DEVICE, 40 UNIT
EACH MISCELLANEOUS
Qty: 1 KIT | Refills: 3 | Status: SHIPPED | OUTPATIENT
Start: 2017-04-21 | End: 2017-04-27 | Stop reason: SDUPTHER

## 2017-04-26 ENCOUNTER — HOSPITAL ENCOUNTER (OUTPATIENT)
Dept: CARDIOLOGY | Facility: HOSPITAL | Age: 82
Setting detail: RECURRING SERIES
Discharge: HOME | End: 2017-04-26

## 2017-04-26 PROCEDURE — 85610 PROTHROMBIN TIME: CPT

## 2017-04-26 PROCEDURE — 36416 COLLJ CAPILLARY BLOOD SPEC: CPT

## 2017-04-27 RX ORDER — DIGOXIN 125 MCG
125 TABLET ORAL DAILY
Qty: 90 TABLET | Refills: 3 | Status: SHIPPED | OUTPATIENT
Start: 2017-04-27 | End: 2018-04-19 | Stop reason: SDUPTHER

## 2017-04-27 RX ORDER — SUB-Q INSULIN DEVICE, 40 UNIT
EACH MISCELLANEOUS
Qty: 1 KIT | Refills: 3 | Status: SHIPPED | OUTPATIENT
Start: 2017-04-27 | End: 2018-01-03 | Stop reason: SDUPTHER

## 2017-05-03 ENCOUNTER — RESULTS ENCOUNTER (OUTPATIENT)
Dept: ENDOCRINOLOGY | Age: 82
End: 2017-05-03

## 2017-05-03 DIAGNOSIS — R79.89 LOW TESTOSTERONE: ICD-10-CM

## 2017-05-07 ENCOUNTER — RESULTS ENCOUNTER (OUTPATIENT)
Dept: ENDOCRINOLOGY | Age: 82
End: 2017-05-07

## 2017-05-07 DIAGNOSIS — R79.89 LOW TESTOSTERONE: ICD-10-CM

## 2017-05-08 RX ORDER — BLOOD SUGAR DIAGNOSTIC
STRIP MISCELLANEOUS
Qty: 120 EACH | Refills: 3 | Status: SHIPPED | OUTPATIENT
Start: 2017-05-08 | End: 2017-08-31 | Stop reason: SDUPTHER

## 2017-05-15 ENCOUNTER — TELEPHONE (OUTPATIENT)
Dept: CARDIOLOGY | Facility: CLINIC | Age: 82
End: 2017-05-15

## 2017-05-15 RX ORDER — FUROSEMIDE 20 MG/1
20 TABLET ORAL DAILY
Qty: 30 TABLET | Refills: 0 | Status: SHIPPED | OUTPATIENT
Start: 2017-05-15 | End: 2017-06-10 | Stop reason: SDUPTHER

## 2017-05-17 ENCOUNTER — OFFICE VISIT (OUTPATIENT)
Dept: CARDIOLOGY | Facility: CLINIC | Age: 82
End: 2017-05-17

## 2017-05-17 ENCOUNTER — LAB (OUTPATIENT)
Dept: LAB | Facility: HOSPITAL | Age: 82
End: 2017-05-17

## 2017-05-17 VITALS
DIASTOLIC BLOOD PRESSURE: 78 MMHG | HEART RATE: 76 BPM | WEIGHT: 178.8 LBS | SYSTOLIC BLOOD PRESSURE: 118 MMHG | HEIGHT: 72 IN | BODY MASS INDEX: 24.22 KG/M2

## 2017-05-17 DIAGNOSIS — R22.43 LOCALIZED SWELLING OF BOTH LOWER LEGS: ICD-10-CM

## 2017-05-17 DIAGNOSIS — I50.23 ACUTE ON CHRONIC SYSTOLIC CONGESTIVE HEART FAILURE (HCC): Primary | ICD-10-CM

## 2017-05-17 DIAGNOSIS — R22.43 LOCALIZED SWELLING OF BOTH LOWER LEGS: Primary | ICD-10-CM

## 2017-05-17 DIAGNOSIS — R94.31 ABNORMAL ELECTROCARDIOGRAM: ICD-10-CM

## 2017-05-17 LAB
ANION GAP SERPL CALCULATED.3IONS-SCNC: 12.9 MMOL/L
BUN BLD-MCNC: 33 MG/DL (ref 8–23)
BUN/CREAT SERPL: 18.8 (ref 7–25)
CALCIUM SPEC-SCNC: 10 MG/DL (ref 8.6–10.5)
CHLORIDE SERPL-SCNC: 102 MMOL/L (ref 98–107)
CO2 SERPL-SCNC: 27.1 MMOL/L (ref 22–29)
CREAT BLD-MCNC: 1.76 MG/DL (ref 0.76–1.27)
GFR SERPL CREATININE-BSD FRML MDRD: 38 ML/MIN/1.73
GLUCOSE BLD-MCNC: 125 MG/DL (ref 65–99)
POTASSIUM BLD-SCNC: 4.3 MMOL/L (ref 3.5–5.2)
SODIUM BLD-SCNC: 142 MMOL/L (ref 136–145)

## 2017-05-17 PROCEDURE — 93000 ELECTROCARDIOGRAM COMPLETE: CPT | Performed by: INTERNAL MEDICINE

## 2017-05-17 PROCEDURE — 80048 BASIC METABOLIC PNL TOTAL CA: CPT

## 2017-05-17 PROCEDURE — 36415 COLL VENOUS BLD VENIPUNCTURE: CPT

## 2017-05-17 PROCEDURE — 99214 OFFICE O/P EST MOD 30 MIN: CPT | Performed by: INTERNAL MEDICINE

## 2017-05-19 RX ORDER — ATORVASTATIN CALCIUM 20 MG/1
TABLET, FILM COATED ORAL
Qty: 90 TABLET | Refills: 0 | Status: SHIPPED | OUTPATIENT
Start: 2017-05-19 | End: 2017-08-15 | Stop reason: SDUPTHER

## 2017-05-24 ENCOUNTER — LAB (OUTPATIENT)
Dept: LAB | Facility: HOSPITAL | Age: 82
End: 2017-05-24

## 2017-05-24 ENCOUNTER — HOSPITAL ENCOUNTER (OUTPATIENT)
Dept: CARDIOLOGY | Facility: HOSPITAL | Age: 82
Setting detail: RECURRING SERIES
Discharge: HOME | End: 2017-05-24

## 2017-05-24 DIAGNOSIS — I50.23 ACUTE ON CHRONIC SYSTOLIC CONGESTIVE HEART FAILURE (HCC): ICD-10-CM

## 2017-05-24 LAB
ANION GAP SERPL CALCULATED.3IONS-SCNC: 15 MMOL/L
BUN BLD-MCNC: 43 MG/DL (ref 8–23)
BUN/CREAT SERPL: 23.2 (ref 7–25)
CALCIUM SPEC-SCNC: 10.1 MG/DL (ref 8.6–10.5)
CHLORIDE SERPL-SCNC: 100 MMOL/L (ref 98–107)
CO2 SERPL-SCNC: 26 MMOL/L (ref 22–29)
CREAT BLD-MCNC: 1.85 MG/DL (ref 0.76–1.27)
GFR SERPL CREATININE-BSD FRML MDRD: 35 ML/MIN/1.73
GLUCOSE BLD-MCNC: 280 MG/DL (ref 65–99)
POTASSIUM BLD-SCNC: 4.6 MMOL/L (ref 3.5–5.2)
SODIUM BLD-SCNC: 141 MMOL/L (ref 136–145)

## 2017-05-24 PROCEDURE — 36416 COLLJ CAPILLARY BLOOD SPEC: CPT

## 2017-05-24 PROCEDURE — 80048 BASIC METABOLIC PNL TOTAL CA: CPT

## 2017-05-24 PROCEDURE — 85610 PROTHROMBIN TIME: CPT

## 2017-05-24 PROCEDURE — 36415 COLL VENOUS BLD VENIPUNCTURE: CPT

## 2017-05-25 ENCOUNTER — TELEPHONE (OUTPATIENT)
Dept: CARDIOLOGY | Facility: CLINIC | Age: 82
End: 2017-05-25

## 2017-05-26 DIAGNOSIS — R60.9 SWELLING: Primary | ICD-10-CM

## 2017-06-01 ENCOUNTER — HOSPITAL ENCOUNTER (OUTPATIENT)
Dept: CARDIOLOGY | Facility: HOSPITAL | Age: 82
Discharge: HOME | End: 2017-06-01
Attending: INTERNAL MEDICINE | Admitting: INTERNAL MEDICINE

## 2017-06-01 ENCOUNTER — LAB (OUTPATIENT)
Dept: LAB | Facility: HOSPITAL | Age: 82
End: 2017-06-01

## 2017-06-01 ENCOUNTER — HOSPITAL ENCOUNTER (OUTPATIENT)
Dept: CARDIOLOGY | Facility: HOSPITAL | Age: 82
Discharge: HOME | End: 2017-06-01
Attending: INTERNAL MEDICINE

## 2017-06-01 VITALS
WEIGHT: 178 LBS | HEART RATE: 69 BPM | BODY MASS INDEX: 24.11 KG/M2 | SYSTOLIC BLOOD PRESSURE: 138 MMHG | DIASTOLIC BLOOD PRESSURE: 64 MMHG | HEIGHT: 72 IN

## 2017-06-01 DIAGNOSIS — R60.9 SWELLING: ICD-10-CM

## 2017-06-01 DIAGNOSIS — R22.43 LOCALIZED SWELLING OF BOTH LOWER LEGS: ICD-10-CM

## 2017-06-01 DIAGNOSIS — R94.31 ABNORMAL ELECTROCARDIOGRAM: ICD-10-CM

## 2017-06-01 LAB
ANION GAP SERPL CALCULATED.3IONS-SCNC: 13.1 MMOL/L
BH CV ECHO MEAS - AO MAX PG (FULL): 16.7 MMHG
BH CV ECHO MEAS - AO MAX PG: 19.2 MMHG
BH CV ECHO MEAS - AO MEAN PG (FULL): 9.1 MMHG
BH CV ECHO MEAS - AO MEAN PG: 10.5 MMHG
BH CV ECHO MEAS - AO ROOT AREA (BSA CORRECTED): 1.5
BH CV ECHO MEAS - AO ROOT AREA: 7.1 CM^2
BH CV ECHO MEAS - AO ROOT DIAM: 3 CM
BH CV ECHO MEAS - AO V2 MAX: 218.9 CM/SEC
BH CV ECHO MEAS - AO V2 MEAN: 151.2 CM/SEC
BH CV ECHO MEAS - AO V2 VTI: 43.1 CM
BH CV ECHO MEAS - AVA(I,A): 1.2 CM^2
BH CV ECHO MEAS - AVA(I,D): 1.2 CM^2
BH CV ECHO MEAS - AVA(V,A): 1.2 CM^2
BH CV ECHO MEAS - AVA(V,D): 1.2 CM^2
BH CV ECHO MEAS - BSA(HAYCOCK): 2 M^2
BH CV ECHO MEAS - BSA: 2 M^2
BH CV ECHO MEAS - BZI_BMI: 24.1 KILOGRAMS/M^2
BH CV ECHO MEAS - BZI_METRIC_HEIGHT: 182.9 CM
BH CV ECHO MEAS - BZI_METRIC_WEIGHT: 80.7 KG
BH CV ECHO MEAS - CONTRAST EF (2CH): 50.7 ML/M^2
BH CV ECHO MEAS - CONTRAST EF 4CH: 53.1 ML/M^2
BH CV ECHO MEAS - EDV(MOD-SP2): 69 ML
BH CV ECHO MEAS - EDV(MOD-SP4): 64 ML
BH CV ECHO MEAS - EDV(TEICH): 111.6 ML
BH CV ECHO MEAS - EF(CUBED): 65.6 %
BH CV ECHO MEAS - EF(MOD-SP2): 50.7 %
BH CV ECHO MEAS - EF(MOD-SP4): 53.1 %
BH CV ECHO MEAS - EF(TEICH): 57 %
BH CV ECHO MEAS - ESV(MOD-SP2): 34 ML
BH CV ECHO MEAS - ESV(MOD-SP4): 30 ML
BH CV ECHO MEAS - ESV(TEICH): 48.1 ML
BH CV ECHO MEAS - FS: 29.9 %
BH CV ECHO MEAS - IVS/LVPW: 1
BH CV ECHO MEAS - IVSD: 1.1 CM
BH CV ECHO MEAS - LAT PEAK E' VEL: 9 CM/SEC
BH CV ECHO MEAS - LV DIASTOLIC VOL/BSA (35-75): 31.6 ML/M^2
BH CV ECHO MEAS - LV MASS(C)D: 193 GRAMS
BH CV ECHO MEAS - LV MASS(C)DI: 95.2 GRAMS/M^2
BH CV ECHO MEAS - LV MAX PG: 2.4 MMHG
BH CV ECHO MEAS - LV MEAN PG: 1.4 MMHG
BH CV ECHO MEAS - LV SYSTOLIC VOL/BSA (12-30): 14.8 ML/M^2
BH CV ECHO MEAS - LV V1 MAX: 78.1 CM/SEC
BH CV ECHO MEAS - LV V1 MEAN: 55.3 CM/SEC
BH CV ECHO MEAS - LV V1 VTI: 15.4 CM
BH CV ECHO MEAS - LVIDD: 4.9 CM
BH CV ECHO MEAS - LVIDS: 3.4 CM
BH CV ECHO MEAS - LVLD AP2: 5.5 CM
BH CV ECHO MEAS - LVLD AP4: 6.2 CM
BH CV ECHO MEAS - LVLS AP2: 5.2 CM
BH CV ECHO MEAS - LVLS AP4: 5.4 CM
BH CV ECHO MEAS - LVOT AREA (M): 3.5 CM^2
BH CV ECHO MEAS - LVOT AREA: 3.4 CM^2
BH CV ECHO MEAS - LVOT DIAM: 2.1 CM
BH CV ECHO MEAS - LVPWD: 1.1 CM
BH CV ECHO MEAS - MED PEAK E' VEL: 9 CM/SEC
BH CV ECHO MEAS - MV DEC SLOPE: 649.9 CM/SEC^2
BH CV ECHO MEAS - MV DEC TIME: 0.16 SEC
BH CV ECHO MEAS - MV E MAX VEL: 106.2 CM/SEC
BH CV ECHO MEAS - MV MAX PG: 5 MMHG
BH CV ECHO MEAS - MV MEAN PG: 2.4 MMHG
BH CV ECHO MEAS - MV P1/2T MAX VEL: 106.2 CM/SEC
BH CV ECHO MEAS - MV P1/2T: 47.9 MSEC
BH CV ECHO MEAS - MV V2 MAX: 111.3 CM/SEC
BH CV ECHO MEAS - MV V2 MEAN: 72.1 CM/SEC
BH CV ECHO MEAS - MV V2 VTI: 30.2 CM
BH CV ECHO MEAS - MVA P1/2T LCG: 2.1 CM^2
BH CV ECHO MEAS - MVA(P1/2T): 4.6 CM^2
BH CV ECHO MEAS - MVA(VTI): 1.7 CM^2
BH CV ECHO MEAS - PA MAX PG (FULL): 1.9 MMHG
BH CV ECHO MEAS - PA MAX PG: 2.8 MMHG
BH CV ECHO MEAS - PA V2 MAX: 84.3 CM/SEC
BH CV ECHO MEAS - PVA(V,A): 1.9 CM^2
BH CV ECHO MEAS - PVA(V,D): 1.9 CM^2
BH CV ECHO MEAS - QP/QS: 0.63
BH CV ECHO MEAS - RAP SYSTOLE: 15 MMHG
BH CV ECHO MEAS - RV MAX PG: 0.96 MMHG
BH CV ECHO MEAS - RV MEAN PG: 0.59 MMHG
BH CV ECHO MEAS - RV V1 MAX: 49 CM/SEC
BH CV ECHO MEAS - RV V1 MEAN: 35.8 CM/SEC
BH CV ECHO MEAS - RV V1 VTI: 10.3 CM
BH CV ECHO MEAS - RVOT AREA: 3.2 CM^2
BH CV ECHO MEAS - RVOT DIAM: 2 CM
BH CV ECHO MEAS - RVSP: 42 MMHG
BH CV ECHO MEAS - SI(AO): 151.4 ML/M^2
BH CV ECHO MEAS - SI(CUBED): 37.6 ML/M^2
BH CV ECHO MEAS - SI(LVOT): 26 ML/M^2
BH CV ECHO MEAS - SI(MOD-SP2): 17.3 ML/M^2
BH CV ECHO MEAS - SI(MOD-SP4): 16.8 ML/M^2
BH CV ECHO MEAS - SI(TEICH): 31.4 ML/M^2
BH CV ECHO MEAS - SUP REN AO DIAM: 1.6 CM
BH CV ECHO MEAS - SV(AO): 307 ML
BH CV ECHO MEAS - SV(CUBED): 76.1 ML
BH CV ECHO MEAS - SV(LVOT): 52.8 ML
BH CV ECHO MEAS - SV(MOD-SP2): 35 ML
BH CV ECHO MEAS - SV(MOD-SP4): 34 ML
BH CV ECHO MEAS - SV(RVOT): 33.3 ML
BH CV ECHO MEAS - SV(TEICH): 63.6 ML
BH CV ECHO MEAS - TAPSE (>1.6): 1.6 CM2
BH CV ECHO MEAS - TR MAX VEL: 259.3 CM/SEC
BH CV NUCLEAR PRIOR STUDY: 2
BH CV STRESS BP STAGE 1: NORMAL
BH CV STRESS COMMENTS STAGE 1: NORMAL
BH CV STRESS DOSE REGADENOSON STAGE 1: 0.4
BH CV STRESS DURATION MIN STAGE 1: 0
BH CV STRESS DURATION SEC STAGE 1: 15
BH CV STRESS HR STAGE 1: 98
BH CV STRESS PROTOCOL 1: NORMAL
BH CV STRESS RECOVERY BP: NORMAL MMHG
BH CV STRESS RECOVERY HR: 79 BPM
BH CV STRESS STAGE 1: 1
BH CV VAS BP LEFT ARM: NORMAL MMHG
BH CV XLRA - RV BASE: 3.3 CM
BH CV XLRA - TDI S': 12 CM/SEC
BUN BLD-MCNC: 37 MG/DL (ref 8–23)
BUN/CREAT SERPL: 20.2 (ref 7–25)
CALCIUM SPEC-SCNC: 9.3 MG/DL (ref 8.6–10.5)
CHLORIDE SERPL-SCNC: 101 MMOL/L (ref 98–107)
CO2 SERPL-SCNC: 25.9 MMOL/L (ref 22–29)
CREAT BLD-MCNC: 1.83 MG/DL (ref 0.76–1.27)
E/E' RATIO: 12
GFR SERPL CREATININE-BSD FRML MDRD: 36 ML/MIN/1.73
GLUCOSE BLD-MCNC: 259 MG/DL (ref 65–99)
LEFT ATRIUM VOLUME INDEX: 34 ML/M2
LEFT ATRIUM VOLUME: 68 CM3
LV EF NUC BP: 65 %
MAXIMAL PREDICTED HEART RATE: 141 BPM
PERCENT MAX PREDICTED HR: 69.5 %
POTASSIUM BLD-SCNC: 4.5 MMOL/L (ref 3.5–5.2)
SODIUM BLD-SCNC: 140 MMOL/L (ref 136–145)
STRESS BASELINE BP: NORMAL MMHG
STRESS BASELINE HR: 67 BPM
STRESS PERCENT HR: 82 %
STRESS POST EXERCISE DUR SEC: 15 SEC
STRESS POST PEAK BP: NORMAL MMHG
STRESS POST PEAK HR: 98 BPM
STRESS TARGET HR: 120 BPM

## 2017-06-01 PROCEDURE — 78452 HT MUSCLE IMAGE SPECT MULT: CPT | Performed by: INTERNAL MEDICINE

## 2017-06-01 PROCEDURE — 93018 CV STRESS TEST I&R ONLY: CPT | Performed by: INTERNAL MEDICINE

## 2017-06-01 PROCEDURE — 93306 TTE W/DOPPLER COMPLETE: CPT

## 2017-06-01 PROCEDURE — 0 TECHNETIUM TETROFOSMIN KIT: Performed by: INTERNAL MEDICINE

## 2017-06-01 PROCEDURE — 36415 COLL VENOUS BLD VENIPUNCTURE: CPT

## 2017-06-01 PROCEDURE — 25010000002 REGADENOSON 0.4 MG/5ML SOLUTION: Performed by: INTERNAL MEDICINE

## 2017-06-01 PROCEDURE — 93306 TTE W/DOPPLER COMPLETE: CPT | Performed by: INTERNAL MEDICINE

## 2017-06-01 PROCEDURE — 80048 BASIC METABOLIC PNL TOTAL CA: CPT

## 2017-06-01 PROCEDURE — 93017 CV STRESS TEST TRACING ONLY: CPT

## 2017-06-01 PROCEDURE — A9502 TC99M TETROFOSMIN: HCPCS | Performed by: INTERNAL MEDICINE

## 2017-06-01 PROCEDURE — 78452 HT MUSCLE IMAGE SPECT MULT: CPT

## 2017-06-01 PROCEDURE — 93016 CV STRESS TEST SUPVJ ONLY: CPT | Performed by: INTERNAL MEDICINE

## 2017-06-01 RX ADMIN — TETROFOSMIN 1 DOSE: 1.38 INJECTION, POWDER, LYOPHILIZED, FOR SOLUTION INTRAVENOUS at 08:15

## 2017-06-01 RX ADMIN — TETROFOSMIN 1 DOSE: 1.38 INJECTION, POWDER, LYOPHILIZED, FOR SOLUTION INTRAVENOUS at 08:50

## 2017-06-01 RX ADMIN — REGADENOSON 0.4 MG: 0.08 INJECTION, SOLUTION INTRAVENOUS at 08:50

## 2017-06-12 RX ORDER — FUROSEMIDE 20 MG/1
TABLET ORAL
Qty: 90 TABLET | Refills: 1 | Status: SHIPPED | OUTPATIENT
Start: 2017-06-12 | End: 2017-12-10 | Stop reason: SDUPTHER

## 2017-06-23 ENCOUNTER — HOSPITAL ENCOUNTER (OUTPATIENT)
Dept: CARDIOLOGY | Facility: HOSPITAL | Age: 82
Setting detail: RECURRING SERIES
Discharge: HOME | End: 2017-06-23

## 2017-06-23 PROCEDURE — 36416 COLLJ CAPILLARY BLOOD SPEC: CPT

## 2017-06-23 PROCEDURE — 85610 PROTHROMBIN TIME: CPT

## 2017-07-05 RX ORDER — WARFARIN SODIUM 5 MG/1
TABLET ORAL
Qty: 135 TABLET | Refills: 0 | Status: SHIPPED | OUTPATIENT
Start: 2017-07-05 | End: 2017-07-10 | Stop reason: ALTCHOICE

## 2017-07-10 ENCOUNTER — TELEPHONE (OUTPATIENT)
Dept: CARDIOLOGY | Facility: CLINIC | Age: 82
End: 2017-07-10

## 2017-07-10 RX ORDER — WARFARIN SODIUM 5 MG/1
TABLET ORAL
Qty: 135 TABLET | Refills: 0 | Status: SHIPPED | OUTPATIENT
Start: 2017-07-10 | End: 2018-05-23 | Stop reason: SDUPTHER

## 2017-07-10 NOTE — TELEPHONE ENCOUNTER
Okay to change to generic warfarin.  I would recommend an INR to be done one week after he has made that transition.

## 2017-07-10 NOTE — TELEPHONE ENCOUNTER
Patient called, informing me that he has been on brand name Coumadin for a while.  But, his RX has gone up to $267 for a 90 day RX.  Patient asked if he could be switched to Warfarin.  Patient said he doesn't remember trying a generic Coumadin previously.  Patient said he is due to recheck his INR sometime this month.  Please advise.  Ruby

## 2017-07-21 ENCOUNTER — HOSPITAL ENCOUNTER (OUTPATIENT)
Dept: CARDIOLOGY | Facility: HOSPITAL | Age: 82
Setting detail: RECURRING SERIES
Discharge: HOME | End: 2017-07-21

## 2017-07-21 PROCEDURE — 85610 PROTHROMBIN TIME: CPT

## 2017-07-21 PROCEDURE — 36416 COLLJ CAPILLARY BLOOD SPEC: CPT

## 2017-07-28 ENCOUNTER — HOSPITAL ENCOUNTER (OUTPATIENT)
Dept: CARDIOLOGY | Facility: HOSPITAL | Age: 82
Setting detail: RECURRING SERIES
Discharge: HOME | End: 2017-07-28

## 2017-07-28 PROCEDURE — 36416 COLLJ CAPILLARY BLOOD SPEC: CPT

## 2017-07-28 PROCEDURE — 85610 PROTHROMBIN TIME: CPT

## 2017-08-02 DIAGNOSIS — IMO0002 UNCONTROLLED TYPE 2 DIABETES MELLITUS WITH COMPLICATION, WITH LONG-TERM CURRENT USE OF INSULIN: ICD-10-CM

## 2017-08-02 DIAGNOSIS — E55.9 VITAMIN D DEFICIENCY: ICD-10-CM

## 2017-08-02 DIAGNOSIS — N40.0 ENLARGED PROSTATE WITHOUT LOWER URINARY TRACT SYMPTOMS (LUTS): ICD-10-CM

## 2017-08-02 DIAGNOSIS — R79.89 LOW TESTOSTERONE: Primary | ICD-10-CM

## 2017-08-10 ENCOUNTER — LAB (OUTPATIENT)
Dept: ENDOCRINOLOGY | Age: 82
End: 2017-08-10

## 2017-08-10 DIAGNOSIS — N40.0 ENLARGED PROSTATE WITHOUT LOWER URINARY TRACT SYMPTOMS (LUTS): ICD-10-CM

## 2017-08-10 DIAGNOSIS — E55.9 VITAMIN D DEFICIENCY: ICD-10-CM

## 2017-08-10 DIAGNOSIS — R79.89 LOW TESTOSTERONE: ICD-10-CM

## 2017-08-10 DIAGNOSIS — IMO0002 UNCONTROLLED TYPE 2 DIABETES MELLITUS WITH COMPLICATION, WITH LONG-TERM CURRENT USE OF INSULIN: ICD-10-CM

## 2017-08-12 LAB
25(OH)D3+25(OH)D2 SERPL-MCNC: 54.9 NG/ML (ref 30–100)
ALBUMIN SERPL-MCNC: 3.9 G/DL (ref 3.5–5.2)
ALBUMIN/GLOB SERPL: 1.2 G/DL
ALP SERPL-CCNC: 105 U/L (ref 39–117)
ALT SERPL-CCNC: 31 U/L (ref 1–41)
AST SERPL-CCNC: 21 U/L (ref 1–40)
BILIRUB SERPL-MCNC: 0.7 MG/DL (ref 0.1–1.2)
BUN SERPL-MCNC: 33 MG/DL (ref 8–23)
BUN/CREAT SERPL: 20.9 (ref 7–25)
C PEPTIDE SERPL-MCNC: 2.7 NG/ML (ref 1.1–4.4)
CALCIUM SERPL-MCNC: 9.2 MG/DL (ref 8.6–10.5)
CHLORIDE SERPL-SCNC: 103 MMOL/L (ref 98–107)
CHOLEST SERPL-MCNC: 178 MG/DL (ref 0–200)
CO2 SERPL-SCNC: 23.3 MMOL/L (ref 22–29)
CREAT SERPL-MCNC: 1.58 MG/DL (ref 0.76–1.27)
GLOBULIN SER CALC-MCNC: 3.2 GM/DL
GLUCOSE SERPL-MCNC: 164 MG/DL (ref 65–99)
HBA1C MFR BLD: 8.91 % (ref 4.8–5.6)
HCT VFR BLD AUTO: 47.3 % (ref 40.4–52.2)
HDLC SERPL-MCNC: 33 MG/DL (ref 40–60)
HGB BLD-MCNC: 15.1 G/DL (ref 13.7–17.6)
LDLC SERPL CALC-MCNC: 106 MG/DL (ref 0–100)
MICROALBUMIN UR-MCNC: 72.6 UG/ML
POTASSIUM SERPL-SCNC: 4.5 MMOL/L (ref 3.5–5.2)
PROT SERPL-MCNC: 7.1 G/DL (ref 6–8.5)
PSA SERPL-MCNC: 0.06 NG/ML (ref 0–4)
SHBG SERPL-SCNC: 40.7 NMOL/L (ref 19.3–76.4)
SODIUM SERPL-SCNC: 140 MMOL/L (ref 136–145)
TESTOST FREE SERPL-MCNC: 6.3 PG/ML (ref 6.6–18.1)
TESTOST SERPL-MCNC: 339 NG/DL (ref 264–916)
TRIGL SERPL-MCNC: 193 MG/DL (ref 0–150)
VLDLC SERPL CALC-MCNC: 38.6 MG/DL (ref 5–40)

## 2017-08-15 RX ORDER — ATORVASTATIN CALCIUM 20 MG/1
TABLET, FILM COATED ORAL
Qty: 90 TABLET | Refills: 0 | Status: SHIPPED | OUTPATIENT
Start: 2017-08-15 | End: 2017-11-09 | Stop reason: SDUPTHER

## 2017-08-28 ENCOUNTER — HOSPITAL ENCOUNTER (OUTPATIENT)
Dept: CARDIOLOGY | Facility: HOSPITAL | Age: 82
Setting detail: RECURRING SERIES
Discharge: HOME | End: 2017-08-28

## 2017-08-28 PROCEDURE — 36416 COLLJ CAPILLARY BLOOD SPEC: CPT

## 2017-08-28 PROCEDURE — 85610 PROTHROMBIN TIME: CPT

## 2017-08-31 RX ORDER — BLOOD SUGAR DIAGNOSTIC
STRIP MISCELLANEOUS
Qty: 100 EACH | Refills: 2 | Status: SHIPPED | OUTPATIENT
Start: 2017-08-31 | End: 2017-11-12 | Stop reason: SDUPTHER

## 2017-10-02 ENCOUNTER — TELEPHONE (OUTPATIENT)
Dept: ENDOCRINOLOGY | Age: 82
End: 2017-10-02

## 2017-10-02 NOTE — TELEPHONE ENCOUNTER
----- Message from LIZA Combs sent at 10/2/2017 11:18 AM EDT -----  He will need to take 4 units by injection of humalog or novolog send rx for pen  ----- Message -----     From: Renee Varma MA     Sent: 10/2/2017  11:02 AM       To: LIZA Combs    He states that he is taking 4 units with each meal.   ----- Message -----     From: LIZA Combs     Sent: 10/2/2017  10:06 AM       To: Renee Varma MA    Put in referral for medtronic insulin pump. He has to be sure he knows how to count carbs. Insurance may not cover since he is type 2. In the meantime he will need to inject insulin depending on his current dose of insulin. I need to know what that is. I need specific how much he is taking on the v-go and how much with each meal   ----- Message -----     From: Renee Varma MA     Sent: 10/2/2017   9:54 AM       To: LIZA Combs    Patient states that he will be out of his VGO this weekend and wants to be put on a pump immediately. I had relayed to him that you would discuss at next ov but he is asking what he should do in the meantime. He doesn't want to have to pay for the VGO anymore. BG readings are in your door.

## 2017-10-10 ENCOUNTER — HOSPITAL ENCOUNTER (OUTPATIENT)
Dept: CARDIOLOGY | Facility: HOSPITAL | Age: 82
Setting detail: RECURRING SERIES
Discharge: HOME | End: 2017-10-10

## 2017-10-10 PROCEDURE — 85610 PROTHROMBIN TIME: CPT

## 2017-10-10 PROCEDURE — 36416 COLLJ CAPILLARY BLOOD SPEC: CPT

## 2017-10-20 ENCOUNTER — TELEPHONE (OUTPATIENT)
Dept: CARDIOLOGY | Facility: CLINIC | Age: 82
End: 2017-10-20

## 2017-10-20 NOTE — TELEPHONE ENCOUNTER
Heriberto with [facility] pharmacy called and said brand name only Coumadin was called in and the patient OK'd it to be changed to Warfarin but they have to have the doctor say it's OK and send in and new Rx.  Is this OK with you?    Thanks,  Sravanthi

## 2017-10-20 NOTE — TELEPHONE ENCOUNTER
Spoke to Heriberto [facility] pharmacy and they will change the Rx to Warfarin and fill the pt's prescription.    Sravanthi

## 2017-10-23 RX ORDER — METOPROLOL SUCCINATE 25 MG/1
TABLET, EXTENDED RELEASE ORAL
Qty: 30 TABLET | Refills: 2 | Status: SHIPPED | OUTPATIENT
Start: 2017-10-23 | End: 2017-10-30 | Stop reason: SDUPTHER

## 2017-10-30 ENCOUNTER — OFFICE VISIT (OUTPATIENT)
Dept: FAMILY MEDICINE CLINIC | Facility: CLINIC | Age: 82
End: 2017-10-30

## 2017-10-30 VITALS
OXYGEN SATURATION: 98 % | SYSTOLIC BLOOD PRESSURE: 114 MMHG | WEIGHT: 180 LBS | BODY MASS INDEX: 24.38 KG/M2 | TEMPERATURE: 98.3 F | HEART RATE: 68 BPM | DIASTOLIC BLOOD PRESSURE: 70 MMHG | HEIGHT: 72 IN

## 2017-10-30 DIAGNOSIS — IMO0002 UNCONTROLLED TYPE 2 DIABETES MELLITUS WITH COMPLICATION, WITH LONG-TERM CURRENT USE OF INSULIN: Primary | ICD-10-CM

## 2017-10-30 DIAGNOSIS — Z00.00 MEDICARE ANNUAL WELLNESS VISIT, INITIAL: ICD-10-CM

## 2017-10-30 LAB — HBA1C MFR BLD: 8.2 %

## 2017-10-30 PROCEDURE — G0438 PPPS, INITIAL VISIT: HCPCS | Performed by: NURSE PRACTITIONER

## 2017-10-30 PROCEDURE — 99213 OFFICE O/P EST LOW 20 MIN: CPT | Performed by: NURSE PRACTITIONER

## 2017-10-30 PROCEDURE — 83036 HEMOGLOBIN GLYCOSYLATED A1C: CPT | Performed by: NURSE PRACTITIONER

## 2017-10-30 NOTE — PROGRESS NOTES
QUICK REFERENCE INFORMATION:  The ABCs of the Annual Wellness Visit    Initial Medicare Wellness Visit    HEALTH RISK ASSESSMENT    1935    Recent Hospitalizations:  No hospitalization(s) within the last year..        Current Medical Providers:  Patient Care Team:  LIZA Trinh as PCP - General (Family Medicine)        Smoking Status:  History   Smoking Status   • Former Smoker   Smokeless Tobacco   • Former User     Comment: caffeine use       Alcohol Consumption:  History   Alcohol Use   • Yes     Comment: occasional       Depression Screen:   PHQ-2/PHQ-9 Depression Screening 10/30/2017   Little interest or pleasure in doing things 0   Feeling down, depressed, or hopeless 0   Total Score 0       Health Habits and Functional and Cognitive Screening:  Functional & Cognitive Status 10/30/2017   Do you have difficulty preparing food and eating? No   Do you have difficulty bathing yourself, getting dressed or grooming yourself? No   Do you have difficulty using the toilet? No   Do you have difficulty moving around from place to place? No   Do you have trouble with steps or getting out of a bed or a chair? No   In the past year have you fallen or experienced a near fall? Yes   Current Diet Well Balanced Diet   Dental Exam Up to date   Eye Exam Not up to date   Exercise (times per week) 3 times per week   Current Exercise Activities Include Walking   Do you need help using the phone?  No   Are you deaf or do you have serious difficulty hearing?  No   Do you need help with transportation? No   Do you need help shopping? No   Do you need help preparing meals?  No   Do you need help with housework?  No   Do you need help with laundry? No   Do you need help taking your medications? No   Do you need help managing money? No   Have you felt unusual stress, anger or loneliness in the last month? No   Who do you live with? Spouse   If you need help, do you have trouble finding someone available to you? No   Have you  been bothered in the last four weeks by sexual problems? No   Do you have difficulty concentrating, remembering or making decisions? Yes           Does the patient have evidence of cognitive impairment? No    Asiprin use counseling: Taking ASA appropriately as indicated      Recent Lab Results:    Visual Acuity:  No exam data present    Age-appropriate Screening Schedule:  Refer to the list below for future screening recommendations based on patient's age, sex and/or medical conditions. Orders for these recommended tests are listed in the plan section. The patient has been provided with a written plan.    Health Maintenance   Topic Date Due   • TDAP/TD VACCINES (1 - Tdap) 11/22/1954   • PNEUMOCOCCAL VACCINES (65+ LOW/MEDIUM RISK) (1 of 2 - PCV13) 11/22/2002   • DIABETIC EYE EXAM  10/01/2017   • DIABETIC FOOT EXAM  12/05/2017   • HEMOGLOBIN A1C  02/10/2018   • LIPID PANEL  08/10/2018   • URINE MICROALBUMIN  08/10/2018   • INFLUENZA VACCINE  Completed   • ZOSTER VACCINE  Completed        Subjective   History of Present Illness    Betito Sequeira is a 81 y.o. male who presents for an Annual Wellness Visit.    He is also here for an A1c checked and sent him to an endocrinologist but he would like to come back here for his care because they were not doing anything different for him.  His last A1c was checked in August was 8.6 today is 8.2.  He's using his medications as directed he is currently only doing the go and doing clicks at least 4 times a day in watching what he is eating.  He'll switch over to Medicare all his diabetic medications are extremely expensive so he is unable to do most of them but he is doing much better with his diet.    The following portions of the patient's history were reviewed and updated as appropriate: allergies, current medications, past family history, past medical history, past social history, past surgical history and problem list.    Outpatient Medications Prior to Visit   Medication Sig  Dispense Refill   • ACCU-CHEK IFEANYI PLUS test strip CHECK BLOOD SUGAR THREE TO FOUR TIMES A  each 2   • atorvastatin (LIPITOR) 20 MG tablet TAKE ONE TABLET BY MOUTH DAILY 90 tablet 0   • digoxin (LANOXIN) 125 MCG tablet Take 1 tablet by mouth Daily. 90 tablet 3   • diphenhydrAMINE (BENADRYL) 25 MG tablet Take 100 mg by mouth Daily.     • furosemide (LASIX) 20 MG tablet TAKE ONE TABLET BY MOUTH DAILY 90 tablet 1   • glucose blood test strip Accu-Chek Ifeanyi Plus In Vitro Strip; Patient Sig: Accu-Chek Ifeanyi Plus In Vitro Strip CHECK BLOOD SUGAR THREE TIMES A DAY TO FOUR TIMES A DAY; 100; 5; 14-Sep-2015; Active     • guaiFENesin (MUCINEX) 600 MG 12 hr tablet Take  by mouth every 12 (twelve) hours.     • Insulin Disposable Pump (V-GO 40) kit USE AS DIRECTED TID. PA HAS BEEN APPROVED. 1 kit 3   • Insulin Lispro (HUMALOG KWIKPEN) 100 UNIT/ML solution pen-injector Inject 4 Units under the skin 3 (Three) Times a Day. 15 mL 2   • Insulin Pen Needle (BD PEN NEEDLE NILSON U/F) 32G X 4 MM misc Use as directed 200 each 5   • magnesium oxide (MAG-OX) 400 MG tablet Take 400 mg by mouth Daily.     • metoprolol succinate XL (TOPROL-XL) 25 MG 24 hr tablet Take 1 tablet by mouth Daily. 90 tablet 0   • Omega-3 Fatty Acids (FISH OIL) 1000 MG capsule capsule Take  by mouth Daily With Breakfast.     • sodium bicarbonate 650 MG tablet Take 3 tablets by mouth 2 (two) times a day.     • warfarin (COUMADIN) 5 MG tablet Take 1.5 po daily as directed 135 tablet 0   • insulin degludec (TRESIBA FLEXTOUCH) 100 UNIT/ML solution pen-injector injection Inject 30 Units under the skin Daily. 3 pen 5   • metoprolol succinate XL (TOPROL-XL) 25 MG 24 hr tablet TAKE ONE TABLET BY MOUTH DAILY 30 tablet 2     No facility-administered medications prior to visit.        Patient Active Problem List   Diagnosis   • Atrial fibrillation   • Hypertension   • Cardiomyopathy   • Atopic rhinitis   • Uncontrolled type 2 diabetes mellitus   • Gastroesophageal reflux  "disease   • Hyperlipidemia   • Renal insufficiency   • Low testosterone   • Low testosterone   • Medicare annual wellness visit, initial       Advance Care Planning:  has an advance directive - a copy HAS NOT been provided. Have asked the patient to send this to us to add to record.    Identification of Risk Factors:  Risk factors include: weight , unhealthy diet, cardiovascular risk and inactivity.    Review of Systems   Constitutional: Negative for fever.   All other systems reviewed and are negative.      Compared to one year ago, the patient feels his physical health is the same.  Compared to one year ago, the patient feels his mental health is the same.    Objective     Physical Exam   Constitutional: He is oriented to person, place, and time. Vital signs are normal. He appears well-developed and well-nourished. No distress.   HENT:   Head: Normocephalic.   Cardiovascular: Normal rate, regular rhythm and normal heart sounds.    Pulmonary/Chest: Effort normal and breath sounds normal.   Neurological: He is alert and oriented to person, place, and time. Gait normal.   Psychiatric: He has a normal mood and affect. His behavior is normal. Judgment and thought content normal.   Vitals reviewed.      Vitals:    10/30/17 0925   BP: 114/70   BP Location: Right arm   Patient Position: Sitting   Pulse: 68   Temp: 98.3 °F (36.8 °C)   SpO2: 98%   Weight: 180 lb (81.6 kg)   Height: 72\" (182.9 cm)       Body mass index is 24.41 kg/(m^2).  Discussed the patient's BMI with him. The BMI is in the acceptable range.    Assessment/Plan   Patient Self-Management and Personalized Health Advice  The patient has been provided with information about: diet, exercise and weight management and preventive services including:   · Glaucoma screening recommended.    Visit Diagnoses:    ICD-10-CM ICD-9-CM   1. Uncontrolled type 2 diabetes mellitus with complication, with long-term current use of insulin E11.8 250.82    E11.65 V58.67    Z79.4  "   2. Medicare annual wellness visit, initial Z00.00 V70.0       Orders Placed This Encounter   Procedures   • POC Glycosylated Hemoglobin (Hb A1C)       Outpatient Encounter Prescriptions as of 10/30/2017   Medication Sig Dispense Refill   • ACCU-CHEK IFEANYI PLUS test strip CHECK BLOOD SUGAR THREE TO FOUR TIMES A  each 2   • atorvastatin (LIPITOR) 20 MG tablet TAKE ONE TABLET BY MOUTH DAILY 90 tablet 0   • digoxin (LANOXIN) 125 MCG tablet Take 1 tablet by mouth Daily. 90 tablet 3   • diphenhydrAMINE (BENADRYL) 25 MG tablet Take 100 mg by mouth Daily.     • furosemide (LASIX) 20 MG tablet TAKE ONE TABLET BY MOUTH DAILY 90 tablet 1   • glucose blood test strip Accu-Chek Ifeanyi Plus In Vitro Strip; Patient Sig: Accu-Chek Ifeanyi Plus In Vitro Strip CHECK BLOOD SUGAR THREE TIMES A DAY TO FOUR TIMES A DAY; 100; 5; 14-Sep-2015; Active     • guaiFENesin (MUCINEX) 600 MG 12 hr tablet Take  by mouth every 12 (twelve) hours.     • Insulin Disposable Pump (V-GO 40) kit USE AS DIRECTED TID. PA HAS BEEN APPROVED. 1 kit 3   • Insulin Lispro (HUMALOG KWIKPEN) 100 UNIT/ML solution pen-injector Inject 4 Units under the skin 3 (Three) Times a Day. 15 mL 2   • Insulin Pen Needle (BD PEN NEEDLE NILSON U/F) 32G X 4 MM misc Use as directed 200 each 5   • magnesium oxide (MAG-OX) 400 MG tablet Take 400 mg by mouth Daily.     • metoprolol succinate XL (TOPROL-XL) 25 MG 24 hr tablet Take 1 tablet by mouth Daily. 90 tablet 0   • Omega-3 Fatty Acids (FISH OIL) 1000 MG capsule capsule Take  by mouth Daily With Breakfast.     • sodium bicarbonate 650 MG tablet Take 3 tablets by mouth 2 (two) times a day.     • warfarin (COUMADIN) 5 MG tablet Take 1.5 po daily as directed 135 tablet 0   • insulin degludec (TRESIBA FLEXTOUCH) 100 UNIT/ML solution pen-injector injection Inject 30 Units under the skin Daily. 3 pen 5   • [DISCONTINUED] metoprolol succinate XL (TOPROL-XL) 25 MG 24 hr tablet TAKE ONE TABLET BY MOUTH DAILY 30 tablet 2     No  facility-administered encounter medications on file as of 10/30/2017.        Reviewed use of high risk medication in the elderly: yes  Reviewed for potential of harmful drug interactions in the elderly: yes    Follow Up:  Return in about 3 months (around 1/30/2018) for Recheck.     An After Visit Summary and PPPS with all of these plans were given to the patient.         Return the office in 3 months for an A1c checked continue same

## 2017-11-09 RX ORDER — ATORVASTATIN CALCIUM 20 MG/1
TABLET, FILM COATED ORAL
Qty: 90 TABLET | Refills: 0 | Status: SHIPPED | OUTPATIENT
Start: 2017-11-09 | End: 2018-02-04 | Stop reason: SDUPTHER

## 2017-11-10 ENCOUNTER — HOSPITAL ENCOUNTER (OUTPATIENT)
Dept: CARDIOLOGY | Facility: HOSPITAL | Age: 82
Setting detail: RECURRING SERIES
Discharge: HOME | End: 2017-11-10

## 2017-11-10 PROCEDURE — 85610 PROTHROMBIN TIME: CPT

## 2017-11-10 PROCEDURE — 36416 COLLJ CAPILLARY BLOOD SPEC: CPT

## 2017-11-13 RX ORDER — BLOOD SUGAR DIAGNOSTIC
STRIP MISCELLANEOUS
Qty: 300 EACH | Refills: 1 | Status: SHIPPED | OUTPATIENT
Start: 2017-11-13 | End: 2018-10-16 | Stop reason: SDUPTHER

## 2017-12-11 RX ORDER — FUROSEMIDE 20 MG/1
TABLET ORAL
Qty: 90 TABLET | Refills: 0 | Status: SHIPPED | OUTPATIENT
Start: 2017-12-11 | End: 2018-03-10 | Stop reason: SDUPTHER

## 2018-01-04 RX ORDER — SUB-Q INSULIN DEVICE, 40 UNIT
EACH MISCELLANEOUS
Qty: 30 KIT | Refills: 0 | Status: SHIPPED | OUTPATIENT
Start: 2018-01-04 | End: 2018-01-30 | Stop reason: SDUPTHER

## 2018-01-09 ENCOUNTER — TELEPHONE (OUTPATIENT)
Dept: CARDIOLOGY | Facility: HOSPITAL | Age: 83
End: 2018-01-09

## 2018-01-10 ENCOUNTER — HOSPITAL ENCOUNTER (OUTPATIENT)
Dept: CARDIOLOGY | Facility: HOSPITAL | Age: 83
Setting detail: RECURRING SERIES
Discharge: HOME | End: 2018-01-10

## 2018-01-10 PROCEDURE — 85610 PROTHROMBIN TIME: CPT

## 2018-01-10 PROCEDURE — 36416 COLLJ CAPILLARY BLOOD SPEC: CPT

## 2018-01-30 ENCOUNTER — OFFICE VISIT (OUTPATIENT)
Dept: FAMILY MEDICINE CLINIC | Facility: CLINIC | Age: 83
End: 2018-01-30

## 2018-01-30 ENCOUNTER — HOSPITAL ENCOUNTER (OUTPATIENT)
Dept: CARDIOLOGY | Facility: HOSPITAL | Age: 83
Setting detail: RECURRING SERIES
Discharge: HOME | End: 2018-01-30

## 2018-01-30 VITALS
HEART RATE: 50 BPM | TEMPERATURE: 97.9 F | WEIGHT: 182.4 LBS | BODY MASS INDEX: 24.71 KG/M2 | DIASTOLIC BLOOD PRESSURE: 68 MMHG | SYSTOLIC BLOOD PRESSURE: 138 MMHG | HEIGHT: 72 IN | OXYGEN SATURATION: 99 %

## 2018-01-30 DIAGNOSIS — IMO0002 UNCONTROLLED TYPE 2 DIABETES MELLITUS WITH COMPLICATION, WITH LONG-TERM CURRENT USE OF INSULIN: Primary | ICD-10-CM

## 2018-01-30 PROBLEM — R79.89 LOW TESTOSTERONE: Status: RESOLVED | Noted: 2017-04-07 | Resolved: 2018-01-30

## 2018-01-30 PROBLEM — Z00.00 MEDICARE ANNUAL WELLNESS VISIT, INITIAL: Status: RESOLVED | Noted: 2017-10-30 | Resolved: 2018-01-30

## 2018-01-30 LAB — HBA1C MFR BLD: 9.2 %

## 2018-01-30 PROCEDURE — 83036 HEMOGLOBIN GLYCOSYLATED A1C: CPT | Performed by: NURSE PRACTITIONER

## 2018-01-30 PROCEDURE — 36416 COLLJ CAPILLARY BLOOD SPEC: CPT

## 2018-01-30 PROCEDURE — 85610 PROTHROMBIN TIME: CPT

## 2018-01-30 PROCEDURE — 99213 OFFICE O/P EST LOW 20 MIN: CPT | Performed by: NURSE PRACTITIONER

## 2018-01-30 RX ORDER — SUB-Q INSULIN DEVICE, 40 UNIT
EACH MISCELLANEOUS
Qty: 30 KIT | Refills: 6 | Status: SHIPPED | OUTPATIENT
Start: 2018-01-30 | End: 2018-05-01 | Stop reason: SDUPTHER

## 2018-01-30 NOTE — PROGRESS NOTES
"Subjective   Betito Sequeira is a 82 y.o. male.     History of Present Illness   Patient presenting to the office today for an A1c checked.  Patient states that he has been bad over the holidays and hebeen doing well he is still continuing to snack he is using all of his diabetic medications as directed but his blood sugars seem to be a little out of control due to the fact that he is eating more than he should be.  The following portions of the patient's history were reviewed and updated as appropriate: allergies, current medications, past social history and problem list.    Review of Systems   Constitutional: Negative for fever.   All other systems reviewed and are negative.      Objective   /68 (BP Location: Right arm, Patient Position: Sitting)  Pulse 50  Temp 97.9 °F (36.6 °C)  Ht 182.9 cm (72.01\")  Wt 82.7 kg (182 lb 6.4 oz)  SpO2 99%  BMI 24.73 kg/m2  Physical Exam   Constitutional: He is oriented to person, place, and time. Vital signs are normal. He appears well-developed and well-nourished. No distress.   HENT:   Head: Normocephalic.   Cardiovascular: Normal rate, regular rhythm and normal heart sounds.    Pulmonary/Chest: Effort normal and breath sounds normal.   Neurological: He is alert and oriented to person, place, and time. Gait normal.   Psychiatric: He has a normal mood and affect. His behavior is normal. Judgment and thought content normal.   Vitals reviewed.      Assessment/Plan   Problem List Items Addressed This Visit        Endocrine    Uncontrolled type 2 diabetes mellitus - Primary    Relevant Medications    Insulin Disposable Pump (V-GO 40) kit    insulin degludec (TRESIBA FLEXTOUCH) 100 UNIT/ML solution pen-injector injection    Other Relevant Orders    POC Glycosylated Hemoglobin (Hb A1C)        Increase Tresiba to 50 units at night   Discussed that if a1c isn't better in 3 mons will stop vgo and do meal time insulin  Stop snacking     "

## 2018-02-01 RX ORDER — METOPROLOL SUCCINATE 25 MG/1
TABLET, EXTENDED RELEASE ORAL
Qty: 30 TABLET | Refills: 2 | Status: SHIPPED | OUTPATIENT
Start: 2018-02-01 | End: 2018-02-23 | Stop reason: ALTCHOICE

## 2018-02-01 RX ORDER — WARFARIN SODIUM 5 MG/1
TABLET ORAL
Qty: 135 TABLET | Refills: 0 | Status: SHIPPED | OUTPATIENT
Start: 2018-02-01 | End: 2018-02-23 | Stop reason: ALTCHOICE

## 2018-02-05 RX ORDER — ATORVASTATIN CALCIUM 20 MG/1
TABLET, FILM COATED ORAL
Qty: 90 TABLET | Refills: 0 | Status: SHIPPED | OUTPATIENT
Start: 2018-02-05 | End: 2018-05-03 | Stop reason: SDUPTHER

## 2018-02-23 ENCOUNTER — OFFICE VISIT (OUTPATIENT)
Dept: CARDIOLOGY | Facility: CLINIC | Age: 83
End: 2018-02-23

## 2018-02-23 VITALS
DIASTOLIC BLOOD PRESSURE: 74 MMHG | HEART RATE: 70 BPM | HEIGHT: 72 IN | WEIGHT: 183.6 LBS | BODY MASS INDEX: 24.87 KG/M2 | SYSTOLIC BLOOD PRESSURE: 140 MMHG

## 2018-02-23 DIAGNOSIS — I10 ESSENTIAL HYPERTENSION: ICD-10-CM

## 2018-02-23 DIAGNOSIS — I48.20 CHRONIC ATRIAL FIBRILLATION (HCC): Primary | ICD-10-CM

## 2018-02-23 PROCEDURE — 93000 ELECTROCARDIOGRAM COMPLETE: CPT | Performed by: INTERNAL MEDICINE

## 2018-02-23 PROCEDURE — 99214 OFFICE O/P EST MOD 30 MIN: CPT | Performed by: INTERNAL MEDICINE

## 2018-03-09 NOTE — PROGRESS NOTES
Subjective:     Encounter Date:05/17/2017      Patient ID: Betito Sequeira is a 82 y.o. male.    Chief Complaint:  Atrial Fibrillation   Presents for follow-up visit. Symptoms are negative for chest pain, hypertension, palpitations and tachycardia. Past medical history includes atrial fibrillation.   Leg Swelling   This is a new problem. The current episode started more than 1 month ago. The problem has been waxing and waning. Pertinent negatives include no chest pain, fatigue or vertigo.   Hypertension   This is a chronic problem. The current episode started more than 1 year ago. The problem is controlled. Pertinent negatives include no chest pain or palpitations.       81-year-old gentleman with a history of chronic atrial fibrillation who presents today for reevaluation. Patient has been doing great.  Denies palpitations shortness of breath edema lightheadedness chest pain or fatigue    Review of Systems   Constitution: Negative for fatigue.   Cardiovascular: Negative for chest pain and palpitations.   Skin: Positive for itching.   Neurological: Negative for vertigo.   All other systems reviewed and are negative.        ECG 12 Lead  Date/Time: 2/23/2018 10:32 AM  Performed by: TANVI PAUL  Authorized by: TANVI PAUL   Comparison: compared with previous ECG from 5/17/2017  Rhythm: atrial fibrillation  Other findings: LVH  Clinical impression: abnormal ECG               Objective:     Physical Exam   Constitutional: He is oriented to person, place, and time. He appears well-developed.   HENT:   Head: Normocephalic.   Eyes: Conjunctivae are normal.   Neck: Normal range of motion.   Cardiovascular: Normal rate, regular rhythm and normal heart sounds.    Pulmonary/Chest: Breath sounds normal.   Abdominal: Soft. Bowel sounds are normal.   Musculoskeletal: Normal range of motion. He exhibits no edema.   Neurological: He is alert and oriented to person, place, and time.   Skin: Skin is warm and dry.    Psychiatric: He has a normal mood and affect. His behavior is normal.   Vitals reviewed.      Lab Review:       Assessment:          Diagnosis Plan   1. Chronic atrial fibrillation     2. Essential hypertension            Plan:       1.  Chronic atrial fibrillation.  Heart rate was still controlled he is anticoagulated with Coumadin.  2. Lower extremity edema.  This has resolved.  He was worked up with a stress test and echocardiogram both which were unremarkable.  His ejection fraction 65%.  There is in his past medical history history of cardiomyopathy ejection fraction has been normal.  He does not have a cardiomyopathy at this time.  3.  Hypertension blood pressures okay.  Would continue to follow at home.    4. Continue same follow-up one year    Atrial Fibrillation and Atrial Flutter  Assessment  • The patient has permanent atrial fibrillation  • This is valvular in etiology  • The patient's CHADS2-VASc score is 4  • A [id number] score of 2 or more is considered a high risk for a thromboembolic event  • Warfarin prescribed    Plan  • Continue in atrial fibrillation with rate control  • Continue warfarin for antithrombotic therapy, bleeding issues discussed

## 2018-03-10 RX ORDER — FUROSEMIDE 20 MG/1
TABLET ORAL
Qty: 90 TABLET | Refills: 1 | Status: SHIPPED | OUTPATIENT
Start: 2018-03-10 | End: 2018-09-04 | Stop reason: SDUPTHER

## 2018-03-13 ENCOUNTER — HOSPITAL ENCOUNTER (OUTPATIENT)
Dept: CARDIOLOGY | Facility: HOSPITAL | Age: 83
Setting detail: RECURRING SERIES
Discharge: HOME | End: 2018-03-13

## 2018-03-13 PROCEDURE — 85610 PROTHROMBIN TIME: CPT

## 2018-03-13 PROCEDURE — 36416 COLLJ CAPILLARY BLOOD SPEC: CPT

## 2018-04-16 ENCOUNTER — HOSPITAL ENCOUNTER (OUTPATIENT)
Dept: CARDIOLOGY | Facility: HOSPITAL | Age: 83
Setting detail: RECURRING SERIES
Discharge: HOME | End: 2018-04-16

## 2018-04-16 PROCEDURE — 85610 PROTHROMBIN TIME: CPT

## 2018-04-16 PROCEDURE — 36416 COLLJ CAPILLARY BLOOD SPEC: CPT

## 2018-04-20 RX ORDER — DIGOXIN 125 MCG
TABLET ORAL
Qty: 90 TABLET | Refills: 2 | Status: SHIPPED | OUTPATIENT
Start: 2018-04-20 | End: 2019-01-17 | Stop reason: SDUPTHER

## 2018-04-30 RX ORDER — METOPROLOL SUCCINATE 25 MG/1
TABLET, EXTENDED RELEASE ORAL
Qty: 90 TABLET | Refills: 1 | Status: SHIPPED | OUTPATIENT
Start: 2018-04-30 | End: 2018-10-16 | Stop reason: SDUPTHER

## 2018-04-30 RX ORDER — METOPROLOL SUCCINATE 25 MG/1
TABLET, EXTENDED RELEASE ORAL
Qty: 90 TABLET | Refills: 1 | Status: SHIPPED | OUTPATIENT
Start: 2018-04-30 | End: 2019-04-20 | Stop reason: SDUPTHER

## 2018-05-01 ENCOUNTER — OFFICE VISIT (OUTPATIENT)
Dept: FAMILY MEDICINE CLINIC | Facility: CLINIC | Age: 83
End: 2018-05-01

## 2018-05-01 VITALS
BODY MASS INDEX: 24.65 KG/M2 | WEIGHT: 182 LBS | SYSTOLIC BLOOD PRESSURE: 130 MMHG | HEART RATE: 67 BPM | HEIGHT: 72 IN | DIASTOLIC BLOOD PRESSURE: 72 MMHG | OXYGEN SATURATION: 98 % | TEMPERATURE: 98.2 F

## 2018-05-01 DIAGNOSIS — IMO0002 UNCONTROLLED TYPE 2 DIABETES MELLITUS WITH COMPLICATION, WITH LONG-TERM CURRENT USE OF INSULIN: Primary | ICD-10-CM

## 2018-05-01 DIAGNOSIS — I10 ESSENTIAL HYPERTENSION: ICD-10-CM

## 2018-05-01 DIAGNOSIS — E78.5 HYPERLIPIDEMIA, UNSPECIFIED HYPERLIPIDEMIA TYPE: ICD-10-CM

## 2018-05-01 DIAGNOSIS — K21.9 GASTROESOPHAGEAL REFLUX DISEASE WITHOUT ESOPHAGITIS: ICD-10-CM

## 2018-05-01 DIAGNOSIS — J32.9 SINUSITIS, UNSPECIFIED CHRONICITY, UNSPECIFIED LOCATION: ICD-10-CM

## 2018-05-01 LAB — HBA1C MFR BLD: 8.5 %

## 2018-05-01 PROCEDURE — 99214 OFFICE O/P EST MOD 30 MIN: CPT | Performed by: NURSE PRACTITIONER

## 2018-05-01 PROCEDURE — 83036 HEMOGLOBIN GLYCOSYLATED A1C: CPT | Performed by: NURSE PRACTITIONER

## 2018-05-01 RX ORDER — CIPROFLOXACIN 500 MG/1
500 TABLET, FILM COATED ORAL EVERY 12 HOURS SCHEDULED
Qty: 20 TABLET | Refills: 0 | Status: SHIPPED | OUTPATIENT
Start: 2018-05-01 | End: 2018-08-01

## 2018-05-01 RX ORDER — SUB-Q INSULIN DEVICE, 40 UNIT
EACH MISCELLANEOUS
Qty: 30 KIT | Refills: 6 | Status: SHIPPED | OUTPATIENT
Start: 2018-05-01 | End: 2018-11-26 | Stop reason: SDUPTHER

## 2018-05-01 NOTE — PROGRESS NOTES
"Subjective   Betito Sequeira is a 82 y.o. male.     History of Present Illness   Betito Sequeira 82 y.o. male who presents today for routine follow up check and medication refills.  he has a history of   Patient Active Problem List   Diagnosis   • Atrial fibrillation   • Hypertension   • Cardiomyopathy   • Atopic rhinitis   • Uncontrolled type 2 diabetes mellitus   • Gastroesophageal reflux disease   • Hyperlipidemia   • Renal insufficiency   • Low testosterone   • Sinusitis   .  Since the last visit, he has overall felt wife has sinus infection and he is starting to get sick.  Requestinga med just in case .  He has Hypertenision and is well controlled on medication, GERD and is well controlled on PPI medication and Hyperlipidemia and is well controlled on medication.  he has been compliant with current medications have reviewed them.  The patient denies medication side effects.    Results for orders placed or performed in visit on 05/01/18   POC Glycosylated Hemoglobin (Hb A1C)   Result Value Ref Range    Hemoglobin A1C 8.5 %         The following portions of the patient's history were reviewed and updated as appropriate: allergies, current medications, past social history and problem list.    Review of Systems   Constitutional: Negative for fever.   All other systems reviewed and are negative.      Objective   /72 (BP Location: Right arm, Patient Position: Sitting)   Pulse 67   Temp 98.2 °F (36.8 °C)   Ht 182.9 cm (72.01\")   Wt 82.6 kg (182 lb)   SpO2 98%   BMI 24.68 kg/m²   Physical Exam   Constitutional: He is oriented to person, place, and time. Vital signs are normal. He appears well-developed and well-nourished. No distress.   HENT:   Head: Normocephalic.   Cardiovascular: Normal rate, regular rhythm and normal heart sounds.    Pulmonary/Chest: Effort normal and breath sounds normal.   Neurological: He is alert and oriented to person, place, and time. Gait normal.   Psychiatric: He has a normal mood " and affect. His behavior is normal. Judgment and thought content normal.   Vitals reviewed.      Assessment/Plan   Problem List Items Addressed This Visit        Cardiovascular and Mediastinum    Hypertension    Hyperlipidemia       Respiratory    Sinusitis    Relevant Medications    ciprofloxacin (CIPRO) 500 MG tablet       Digestive    Gastroesophageal reflux disease       Endocrine    Uncontrolled type 2 diabetes mellitus - Primary    Relevant Medications    Insulin Disposable Pump (V-GO 40) kit    insulin lispro (HUMALOG) 100 UNIT/ML injection    Other Relevant Orders    POC Glycosylated Hemoglobin (Hb A1C) (Completed)      Other Visit Diagnoses    None.          increase Tresiba to 45 units at night cont same with vgo  rto in 3 mons

## 2018-05-03 RX ORDER — ATORVASTATIN CALCIUM 20 MG/1
TABLET, FILM COATED ORAL
Qty: 90 TABLET | Refills: 0 | Status: SHIPPED | OUTPATIENT
Start: 2018-05-03 | End: 2018-07-29 | Stop reason: SDUPTHER

## 2018-05-22 ENCOUNTER — TELEPHONE (OUTPATIENT)
Dept: CARDIOLOGY | Facility: CLINIC | Age: 83
End: 2018-05-22

## 2018-05-22 RX ORDER — WARFARIN SODIUM 5 MG/1
TABLET ORAL
Qty: 135 TABLET | Refills: 0 | OUTPATIENT
Start: 2018-05-22

## 2018-05-22 NOTE — TELEPHONE ENCOUNTER
Pt called and was upset because his warfarin was denied.  I explained to him that it was because he has not had his INR done and needs to have it done for the month of May, and once it is done his medication can be refilled.  He said he will have it done tomorrow.    Sravanthi

## 2018-05-23 ENCOUNTER — HOSPITAL ENCOUNTER (OUTPATIENT)
Dept: CARDIOLOGY | Facility: HOSPITAL | Age: 83
Setting detail: RECURRING SERIES
Discharge: HOME | End: 2018-05-23

## 2018-05-23 PROCEDURE — 85610 PROTHROMBIN TIME: CPT

## 2018-05-23 PROCEDURE — 36416 COLLJ CAPILLARY BLOOD SPEC: CPT

## 2018-05-23 RX ORDER — WARFARIN SODIUM 5 MG/1
TABLET ORAL
Qty: 45 TABLET | Refills: 0 | Status: SHIPPED | OUTPATIENT
Start: 2018-05-23 | End: 2018-06-20 | Stop reason: SDUPTHER

## 2018-05-30 ENCOUNTER — TELEPHONE (OUTPATIENT)
Dept: FAMILY MEDICINE CLINIC | Facility: CLINIC | Age: 83
End: 2018-05-30

## 2018-05-30 NOTE — TELEPHONE ENCOUNTER
Pt wife calling, states that she is needing rx called into pharm for pt for needles to put on the Tresiba pen for his injections.   She states the last rx the got was the Ashkan Fine Plus 32g disposable needles. Please advise (Oneyda Goodwin Level)

## 2018-06-18 ENCOUNTER — HOSPITAL ENCOUNTER (OUTPATIENT)
Dept: CARDIOLOGY | Facility: HOSPITAL | Age: 83
Setting detail: RECURRING SERIES
Discharge: HOME | End: 2018-06-18

## 2018-06-18 PROCEDURE — 36416 COLLJ CAPILLARY BLOOD SPEC: CPT

## 2018-06-18 PROCEDURE — 85610 PROTHROMBIN TIME: CPT

## 2018-06-20 RX ORDER — WARFARIN SODIUM 5 MG/1
TABLET ORAL
Qty: 45 TABLET | Refills: 0 | Status: SHIPPED | OUTPATIENT
Start: 2018-06-20 | End: 2018-07-24 | Stop reason: SDUPTHER

## 2018-07-16 ENCOUNTER — HOSPITAL ENCOUNTER (OUTPATIENT)
Dept: CARDIOLOGY | Facility: HOSPITAL | Age: 83
Setting detail: RECURRING SERIES
Discharge: HOME | End: 2018-07-16

## 2018-07-16 PROCEDURE — 36416 COLLJ CAPILLARY BLOOD SPEC: CPT

## 2018-07-16 PROCEDURE — 85610 PROTHROMBIN TIME: CPT

## 2018-07-25 RX ORDER — BLOOD SUGAR DIAGNOSTIC
STRIP MISCELLANEOUS
Qty: 300 EACH | Refills: 1 | Status: SHIPPED | OUTPATIENT
Start: 2018-07-25 | End: 2018-12-11 | Stop reason: SDUPTHER

## 2018-07-25 RX ORDER — WARFARIN SODIUM 5 MG/1
TABLET ORAL
Qty: 45 TABLET | Refills: 0 | Status: SHIPPED | OUTPATIENT
Start: 2018-07-25 | End: 2018-08-21 | Stop reason: SDUPTHER

## 2018-07-30 RX ORDER — ATORVASTATIN CALCIUM 20 MG/1
TABLET, FILM COATED ORAL
Qty: 90 TABLET | Refills: 0 | Status: SHIPPED | OUTPATIENT
Start: 2018-07-30 | End: 2018-10-24 | Stop reason: SDUPTHER

## 2018-08-01 ENCOUNTER — OFFICE VISIT (OUTPATIENT)
Dept: FAMILY MEDICINE CLINIC | Facility: CLINIC | Age: 83
End: 2018-08-01

## 2018-08-01 VITALS
BODY MASS INDEX: 24.46 KG/M2 | HEART RATE: 60 BPM | TEMPERATURE: 97.6 F | SYSTOLIC BLOOD PRESSURE: 128 MMHG | WEIGHT: 180.6 LBS | OXYGEN SATURATION: 94 % | DIASTOLIC BLOOD PRESSURE: 68 MMHG | HEIGHT: 72 IN

## 2018-08-01 DIAGNOSIS — E78.5 HYPERLIPIDEMIA, UNSPECIFIED HYPERLIPIDEMIA TYPE: ICD-10-CM

## 2018-08-01 DIAGNOSIS — I10 ESSENTIAL HYPERTENSION: ICD-10-CM

## 2018-08-01 DIAGNOSIS — IMO0002 UNCONTROLLED TYPE 2 DIABETES MELLITUS WITH COMPLICATION, WITH LONG-TERM CURRENT USE OF INSULIN: Primary | ICD-10-CM

## 2018-08-01 LAB — HBA1C MFR BLD: 8 %

## 2018-08-01 PROCEDURE — 83036 HEMOGLOBIN GLYCOSYLATED A1C: CPT | Performed by: NURSE PRACTITIONER

## 2018-08-01 PROCEDURE — 99214 OFFICE O/P EST MOD 30 MIN: CPT | Performed by: NURSE PRACTITIONER

## 2018-08-01 NOTE — PROGRESS NOTES
"Subjective   Francisco Sequeira is a 82 y.o. male.     History of Present Illness   Francisco Sequeira 82 y.o. male who presents today for routine follow up check and medication refills.  he has a history of   Patient Active Problem List   Diagnosis   • Atrial fibrillation (CMS/HCC)   • Hypertension   • Cardiomyopathy (CMS/HCC)   • Atopic rhinitis   • Uncontrolled type 2 diabetes mellitus (CMS/HCC)   • Gastroesophageal reflux disease   • Hyperlipidemia   • Renal insufficiency   • Low testosterone   • Sinusitis   .  Since the last visit, he has overall felt well.  He has Hypertenision and is well controlled on medication, DMII and is well controlled on medication and lipids well controlled.  he has been compliant with current medications have reviewed them.  The patient denies medication side effects.    Results for orders placed or performed in visit on 08/01/18   POC Glycosylated Hemoglobin (Hb A1C)   Result Value Ref Range    Hemoglobin A1C 8.0 %         The following portions of the patient's history were reviewed and updated as appropriate: allergies, current medications, past social history and problem list.    Review of Systems   Constitutional: Negative for fever.   All other systems reviewed and are negative.      Objective   /68 (BP Location: Left arm, Patient Position: Sitting)   Pulse 60   Temp 97.6 °F (36.4 °C)   Ht 182.9 cm (72.01\")   Wt 81.9 kg (180 lb 9.6 oz)   SpO2 94%   BMI 24.49 kg/m²   Physical Exam   Constitutional: He is oriented to person, place, and time. Vital signs are normal. He appears well-developed and well-nourished. No distress.   HENT:   Head: Normocephalic.   Cardiovascular: Normal rate, regular rhythm and normal heart sounds.    Pulmonary/Chest: Effort normal and breath sounds normal.   Neurological: He is alert and oriented to person, place, and time. Gait normal.   Psychiatric: He has a normal mood and affect. His behavior is normal. Judgment and thought content normal. "   Vitals reviewed.      Assessment/Plan   Problem List Items Addressed This Visit        Cardiovascular and Mediastinum    Hypertension    Hyperlipidemia       Endocrine    Uncontrolled type 2 diabetes mellitus (CMS/HCC) - Primary    Relevant Orders    POC Glycosylated Hemoglobin (Hb A1C)        Cont same rto in 3 mons

## 2018-08-15 ENCOUNTER — HOSPITAL ENCOUNTER (OUTPATIENT)
Dept: CARDIOLOGY | Facility: HOSPITAL | Age: 83
Setting detail: RECURRING SERIES
Discharge: HOME | End: 2018-08-15

## 2018-08-15 PROCEDURE — 36416 COLLJ CAPILLARY BLOOD SPEC: CPT

## 2018-08-15 PROCEDURE — 85610 PROTHROMBIN TIME: CPT

## 2018-08-21 RX ORDER — WARFARIN SODIUM 5 MG/1
TABLET ORAL
Qty: 45 TABLET | Refills: 0 | Status: SHIPPED | OUTPATIENT
Start: 2018-08-21 | End: 2018-09-19 | Stop reason: SDUPTHER

## 2018-09-04 RX ORDER — FUROSEMIDE 20 MG/1
TABLET ORAL
Qty: 90 TABLET | Refills: 0 | Status: SHIPPED | OUTPATIENT
Start: 2018-09-04 | End: 2018-12-02 | Stop reason: SDUPTHER

## 2018-09-19 ENCOUNTER — ANTICOAGULATION VISIT (OUTPATIENT)
Dept: PHARMACY | Facility: HOSPITAL | Age: 83
End: 2018-09-19

## 2018-09-19 DIAGNOSIS — Z95.2 HX OF AORTIC VALVE REPLACEMENT, MECHANICAL: ICD-10-CM

## 2018-09-19 DIAGNOSIS — I48.20 CHRONIC ATRIAL FIBRILLATION (HCC): ICD-10-CM

## 2018-09-19 DIAGNOSIS — Z95.2 HX OF MITRAL VALVE REPLACEMENT WITH MECHANICAL VALVE: ICD-10-CM

## 2018-09-19 LAB
INR PPP: 3.6 (ref 0.91–1.09)
PROTHROMBIN TIME: 43.2 SECONDS (ref 10–13.8)

## 2018-09-19 PROCEDURE — 36416 COLLJ CAPILLARY BLOOD SPEC: CPT

## 2018-09-19 PROCEDURE — G0463 HOSPITAL OUTPT CLINIC VISIT: HCPCS

## 2018-09-19 PROCEDURE — 85610 PROTHROMBIN TIME: CPT

## 2018-09-19 NOTE — PROGRESS NOTES
Anticoagulation Clinic Progress Note  Anticoagulation Summary  As of 2018    INR goal:   2.5-3.5   TTR:   --   Today's INR:   3.6!   Warfarin maintenance plan:   5 mg on Mon, Wed, Fri; 7.5 mg all other days   Weekly warfarin total:   45 mg   Plan last modified:   Marisol Merida RPH (2018)   Next INR check:   10/3/2018   Target end date:   Indefinite    Indications    Hx of mitral valve replacement with mechanical valve [Z95.2] [Z95.2]  Atrial fibrillation (CMS/HCC) [I48.91]             Anticoagulation Episode Summary     INR check location:       Preferred lab:       Send INR reminders to:    [facility]    Comments:         Anticoagulation Care Providers     Provider Role Specialty Phone number    Griffin Fried MD Referring Cardiology [phone]    Marisol Merida RPH Responsible Pharmacy             Drug interactions: no new meds  Diet: consistent    Clinic Interview:  Patient Findings     Positives:   Missed doses    Negatives:   Signs/symptoms of thrombosis, Signs/symptoms of bleeding,   Laboratory test error suspected, Change in health, Change in alcohol use,   Change in activity, Upcoming invasive procedure, Emergency department   visit, Upcoming dental procedure, Extra doses, Change in medications,   Change in diet/appetite, Hospital admission, Bruising, Other complaints    Comments:   Missed a dose last week      Clinical Outcomes     Negatives:   Major bleeding event, Thromboembolic event,   Anticoagulation-related hospital admission, Anticoagulation-related ED   visit, Anticoagulation-related fatality    Comments:   Missed a dose last week        Education:  Francisco Sequeira is a new start in the [facility]. We discussed the followin) Warfarin's indication, mechanism, and dosing  2) Enforced the importance of taking warfarin as instructed and at the same time every day, preferably in the evening so that we can make dose adjustments more easily  following subsequent clinic visits  3) What he should do about a missed dose; pts can take missed doses within about 12 hours of their usual scheduled dose, but he was instructed on the importance of not doubling up on doses unless told to do so by the [facility]  4) Explained possible side effects of warfarin therapy, including increased risk of bleeding, s/sx of bleeding and s/sx of any additional clots/PE/CVA.   5) Discussed monitoring of warfarin, the INR, goal INR range, and the frequency of monitoring  6) Reviewed drug/food/tobacco/EtOH interactions and provided written information covering these topics in more detail, explaining that green, leafy vegetables interact most heavily with warfarin  7) Instructed the pt not to take or discontinue any medications without informing his physician/pharmacist and reminded him to inform us of any dietary changes, as well  8) Explained that he would be coming into the clinic more frequently in these first few weeks of therapy as we try to adjust his dose and achieve a therapeutic INR x 2 consecutive readings. Once that is achieved, patient will follow up in clinic every 4 weeks, on average.    He stated no problems with transportation or scheduling clinic appts in this manner. he expressed understanding of the information provided and has no additional questions at this time.    Francisco Sequeira was presented with a copy of the Patients Rights and Responsibilities. he expressed verbal consent and agreement to receive care in the [facility] under the current collaborative care agreement with [facility].       INR History:  Previous INR data from [facility] is recorded in [facility] and scanned into the patient's chart in [facility]. These results have been analyzed and reviewed.      Plan:  1. INR is Supratherapeutic today- see above in Anticoagulation Summary.   Will instruct Francisco Sequeira to Continue their  warfarin regimen- see above in Anticoagulation Summary.  2. Follow up in 2 weeks  3. Patient declines warfarin refills.  4. Verbal and written information provided. Patient expresses understanding and has no further questions at this time.    Marisol Merida RP

## 2018-09-20 RX ORDER — WARFARIN SODIUM 5 MG/1
TABLET ORAL
Qty: 39 TABLET | Refills: 0 | Status: SHIPPED | OUTPATIENT
Start: 2018-09-20 | End: 2018-10-20 | Stop reason: SDUPTHER

## 2018-10-03 ENCOUNTER — ANTICOAGULATION VISIT (OUTPATIENT)
Dept: PHARMACY | Facility: HOSPITAL | Age: 83
End: 2018-10-03

## 2018-10-03 DIAGNOSIS — Z95.2 HX OF AORTIC VALVE REPLACEMENT, MECHANICAL: ICD-10-CM

## 2018-10-03 LAB
INR PPP: 4.3 (ref 0.91–1.09)
INR PPP: 4.6 (ref 0.91–1.09)
PROTHROMBIN TIME: 51.6 SECONDS (ref 10–13.8)
PROTHROMBIN TIME: 54.6 SECONDS (ref 10–13.8)

## 2018-10-03 PROCEDURE — 36416 COLLJ CAPILLARY BLOOD SPEC: CPT

## 2018-10-03 PROCEDURE — 85610 PROTHROMBIN TIME: CPT

## 2018-10-03 NOTE — PROGRESS NOTES
Anticoagulation Clinic Progress Note    Anticoagulation Summary  As of 10/3/2018    INR goal:   2.5-3.5   TTR:   0.0 % (4 d)   Today's INR:   4.3!   Warfarin maintenance plan:   5 mg on Mon, Wed, Fri; 7.5 mg all other days   Weekly warfarin total:   45 mg   Plan last modified:   Marisol Merida RPH (9/19/2018)   Next INR check:   10/10/2018   Target end date:   Indefinite    Indications    Atrial fibrillation (CMS/HCC) [I48.91]  Hx of aortic valve replacement  mechanical [Z95.2] [Z95.2]             Anticoagulation Episode Summary     INR check location:       Preferred lab:       Send INR reminders to:    [facility] [place]    Comments:         Anticoagulation Care Providers     Provider Role Specialty Phone number    Griffin Fried MD Referring Cardiology [phone]    Marisol Merida RPH Responsible Pharmacy           Drug interactions: has remained unchanged.  Diet: has changed in the following manner has been eating fast food, drank a bit more.    Clinic Interview:  Patient Findings     Positives:   Change in diet/appetite    Negatives:   Signs/symptoms of thrombosis, Signs/symptoms of bleeding,   Laboratory test error suspected, Change in health, Change in alcohol use,   Change in activity, Upcoming invasive procedure, Emergency department   visit, Upcoming dental procedure, Missed doses, Extra doses, Change in   medications, Hospital admission, Bruising, Other complaints    Comments:   Has not been eating very healthy.  Drank a bit more than   usual      Clinical Outcomes     Negatives:   Major bleeding event, Thromboembolic event,   Anticoagulation-related hospital admission, Anticoagulation-related ED   visit, Anticoagulation-related fatality    Comments:   Has not been eating very healthy.  Drank a bit more than   usual        INR History:  Anticoagulation Monitoring 9/19/2018 10/3/2018   INR 3.6 4.3   INR Date 9/19/2018 10/3/2018   INR Goal 2.5-3.5 2.5-3.5   Trend - Same   Last Week Total 0 mg  45 mg   Next Week Total 45 mg 40 mg   Sun 7.5 mg 7.5 mg   Mon 5 mg 5 mg   Tue 7.5 mg 7.5 mg   Wed 5 mg Hold (10/3)   Thu 7.5 mg 7.5 mg   Fri 5 mg 5 mg   Sat 7.5 mg 7.5 mg   Visit Report - -       Previous INR data from [facility] is recorded in [facility] and scanned into the patient's chart in [facility]. These results have been analyzed and reviewed.      Plan:  1. INR is Supratherapeutic today- see above in Anticoagulation Summary.  Will instruct Francisco Sequeira to hold today's dose, then resume their warfarin regimen- see above in Anticoagulation Summary.  2. Follow up in 1 week  3. Patient declines warfarin refills.  4. Verbal and written information provided. Patient expresses understanding and has no further questions at this time.    Marisol Merida [facility]

## 2018-10-10 ENCOUNTER — ANTICOAGULATION VISIT (OUTPATIENT)
Dept: PHARMACY | Facility: HOSPITAL | Age: 83
End: 2018-10-10

## 2018-10-10 DIAGNOSIS — Z95.2 HX OF AORTIC VALVE REPLACEMENT, MECHANICAL: ICD-10-CM

## 2018-10-10 LAB
INR PPP: 3.3 (ref 0.91–1.09)
PROTHROMBIN TIME: 40 SECONDS (ref 10–13.8)

## 2018-10-10 PROCEDURE — 85610 PROTHROMBIN TIME: CPT

## 2018-10-10 PROCEDURE — 36416 COLLJ CAPILLARY BLOOD SPEC: CPT

## 2018-10-10 NOTE — PROGRESS NOTES
Anticoagulation Clinic Progress Note    Anticoagulation Summary  As of 10/10/2018    INR goal:   2.5-3.5   TTR:   12.3 % (1.6 wk)   Today's INR:   3.3   Warfarin maintenance plan:   5 mg on Mon, Wed, Fri; 7.5 mg all other days   Weekly warfarin total:   45 mg   No change documented:   Natty Sullivan   Plan last modified:   Marisol Merida RPH (9/19/2018)   Next INR check:   10/24/2018   Target end date:   Indefinite    Indications    Atrial fibrillation (CMS/HCC) [I48.91]  Hx of aortic valve replacement  mechanical [Z95.2] [Z95.2]             Anticoagulation Episode Summary     INR check location:       Preferred lab:       Send INR reminders to:    [facility]    Comments:         Anticoagulation Care Providers     Provider Role Specialty Phone number    Griffin Fried MD Referring Cardiology [phone]    Marisol Merida RPH Responsible Pharmacy           Drug interactions: has remained unchanged.  Diet: has remained unchanged.    Clinic Interview:  Patient Findings     Negatives:   Signs/symptoms of thrombosis, Signs/symptoms of bleeding,   Laboratory test error suspected, Change in health, Change in alcohol use,   Change in activity, Upcoming invasive procedure, Emergency department   visit, Upcoming dental procedure, Missed doses, Extra doses, Change in   medications, Change in diet/appetite, Hospital admission, Bruising, Other   complaints      Clinical Outcomes     Negatives:   Major bleeding event, Thromboembolic event,   Anticoagulation-related hospital admission, Anticoagulation-related ED   visit, Anticoagulation-related fatality        INR History:  Anticoagulation Monitoring 9/19/2018 10/3/2018 10/10/2018   INR 3.6 4.3 3.3   INR Date 9/19/2018 10/3/2018 10/10/2018   INR Goal 2.5-3.5 2.5-3.5 2.5-3.5   Trend - Same Same   Last Week Total 0 mg 45 mg 40 mg   Next Week Total 45 mg 40 mg 45 mg   Sun 7.5 mg 7.5 mg 7.5 mg   Mon 5 mg 5 mg 5 mg   Tue 7.5 mg 7.5 mg 7.5 mg   Wed 5 mg  Hold (10/3) 5 mg   Thu 7.5 mg 7.5 mg 7.5 mg   Fri 5 mg 5 mg 5 mg   Sat 7.5 mg 7.5 mg 7.5 mg   Visit Report - - -   Some recent data might be hidden       Plan:  1. INR is therapeutic today- see above in Anticoagulation Summary.   Will instruct Francisco Sequeira to continue their warfarin regimen- see above in Anticoagulation Summary.  2. Follow up in 2 weeks.  3. Patient declines warfarin refills.  4. Verbal and written information provided. Patient expresses understanding and has no further questions at this time.    Natty Sullivan

## 2018-10-16 ENCOUNTER — APPOINTMENT (OUTPATIENT)
Dept: GENERAL RADIOLOGY | Facility: HOSPITAL | Age: 83
End: 2018-10-16

## 2018-10-16 ENCOUNTER — HOSPITAL ENCOUNTER (EMERGENCY)
Facility: HOSPITAL | Age: 83
Discharge: HOME | End: 2018-10-16
Attending: EMERGENCY MEDICINE | Admitting: EMERGENCY MEDICINE

## 2018-10-16 ENCOUNTER — TELEPHONE (OUTPATIENT)
Dept: FAMILY MEDICINE CLINIC | Facility: CLINIC | Age: 83
End: 2018-10-16

## 2018-10-16 VITALS
RESPIRATION RATE: 17 BRPM | BODY MASS INDEX: 24.38 KG/M2 | OXYGEN SATURATION: 98 % | WEIGHT: 180 LBS | DIASTOLIC BLOOD PRESSURE: 87 MMHG | TEMPERATURE: 97.5 F | HEIGHT: 72 IN | HEART RATE: 73 BPM | SYSTOLIC BLOOD PRESSURE: 159 MMHG

## 2018-10-16 DIAGNOSIS — M25.521 ARTHRALGIA OF RIGHT ELBOW: Primary | ICD-10-CM

## 2018-10-16 PROCEDURE — 99283 EMERGENCY DEPT VISIT LOW MDM: CPT

## 2018-10-16 PROCEDURE — 73070 X-RAY EXAM OF ELBOW: CPT

## 2018-10-16 NOTE — DISCHARGE INSTRUCTIONS
You are advised to follow closely with [facility]/Dr. Ordaz or Dr. Meredith or orthopedist of your choice in 1-2 weeks as needed for recheck, final results of imaging testing, and further testing/treatment as needed.    Ice/elevate at least 3 times a day.  Wear sling as needed for discomfort.        Please return to the emergency department immediately with chest pain different than usual for you, shortness of air, abdominal pain, persistent vomiting/fever, blood in emesis or stool, lightheadedness/fainting, problems with speech, one sided weakness/numbness, new incontinence, problems with vision, increased pain/swelling/redness or for worsening of symptoms or other concerns.

## 2018-10-16 NOTE — ED PROVIDER NOTES
EMERGENCY DEPARTMENT ENCOUNTER    CHIEF COMPLAINT  Chief Complaint: Right elbow pain  History given by: Pt  History limited by: none  Room Number: 33/33  PMD: Rubin Hinkle APRN  Cardiology Dr. Fried    HPI:  Pt is a 82 y.o. male who presents complaining of worsening right elbow pain that began yesterday. Pain is aggravated with movement. Pt c/o twinges of pain intermittently, worse with forced extension and occasionally with turning his wrist. Pt denies night sweats, fever, epistaxis, hematochezia, and increased bruising. Pt denies injury. Pt states he broke his elbow 4-5 years ago with multiple surgeries for reconstruction at [facility]. Pt has h/o DM an afib. Pt states blood sugar has been in 200s. Pt is on Warfarin. Pt took Tylenol last night with some relief.  Pt states he has never been able to fully extend his since his injury.    Duration:  since yesterday  Onset: gradual  Timing: constant  Location: R elbow  Radiation: none stated  Quality: twinges of pain  Intensity/Severity: moderate  Progression: worsening  Associated Symptoms: none stated   Aggravating Factors: certain movement   Previous Episodes: Pt broke elbow 4-5 years ago.  Treatment before arrival: Pt took Tylenol last night.     PAST MEDICAL HISTORY  Active Ambulatory Problems     Diagnosis Date Noted   • Atrial fibrillation (CMS/HCC)    • Hypertension    • Cardiomyopathy (CMS/HCC)    • Atopic rhinitis 03/21/2016   • Uncontrolled type 2 diabetes mellitus (CMS/HCC) 03/21/2016   • Gastroesophageal reflux disease 03/21/2016   • Hyperlipidemia 03/21/2016   • Renal insufficiency 03/31/2017   • Low testosterone 04/03/2017   • Sinusitis 05/01/2018   • Hx of mitral valve replacement with mechanical valve [Z95.2] 09/19/2018   • Hx of aortic valve replacement, mechanical [Z95.2] 09/19/2018     Resolved Ambulatory Problems     Diagnosis Date Noted   • Type 2 diabetes mellitus with complication (CMS/HCC) 03/21/2016   • Poison ivy 09/06/2016   • Low  testosterone 04/07/2017   • Medicare annual wellness visit, initial 10/30/2017     Past Medical History:   Diagnosis Date   • Abnormal electrocardiogram    • Allergic rhinitis    • Aortic valve insufficiency    • Ascending aortic aneurysm (CMS/HCC)    • Atrial fibrillation (CMS/HCC)    • Bacteremia    • Calcific aortic stenosis of bicuspid valve    • Cardiac arrest (CMS/HCC)    • Cardiomyopathy (CMS/HCC)    • Contact dermatitis due to poison ivy    • Diabetes mellitus (CMS/HCC)    • Elbow fracture    • Esophageal reflux    • GERD (gastroesophageal reflux disease)    • Head injury    • Hyperglycemia    • Hyperlipidemia    • Hypertension    • Kidney carcinoma (CMS/HCC)    • Leg swelling    • Localized swelling of both lower legs    • Nasal congestion    • Nasal drainage    • Nonischemic cardiomyopathy (CMS/HCC)    • Palpitations    • Pedal edema    • Pleural effusion on left    • Renal insufficiency syndrome    • Renal oncocytoma    • Seasonal allergic reaction    • Sinusitis    • Syncope    • Type 2 diabetes mellitus (CMS/HCC)    • Vision changes    • Visual field defect        PAST SURGICAL HISTORY  Past Surgical History:   Procedure Laterality Date   • AORTIC VALVE REPAIR/REPLACEMENT     • ASCENDING AORTIC ANEURYSM REPAIR W/ MECHANICAL AORTIC VALVE REPLACEMENT     • NEPHRECTOMY     • OTHER SURGICAL HISTORY      elbow surgery   • PROSTATE SURGERY     • THORACENTESIS Left     diagnostic       FAMILY HISTORY  Family History   Problem Relation Age of Onset   • Cancer Mother         colon   • Cancer Brother         colon       SOCIAL HISTORY  Social History     Social History   • Marital status:      Spouse name: N/A   • Number of children: N/A   • Years of education: N/A     Occupational History   • Not on file.     Social History Main Topics   • Smoking status: Former Smoker   • Smokeless tobacco: Former User      Comment: caffeine use   • Alcohol use Yes      Comment: occasional   • Drug use: No   • Sexual  activity: Defer     Other Topics Concern   • Not on file     Social History Narrative   • No narrative on file       ALLERGIES  Other; Penicillins; and Percocet  [oxycodone-acetaminophen]    REVIEW OF SYSTEMS  Review of Systems   Constitutional: Negative for chills, diaphoresis and fever.   HENT: Negative for nosebleeds, sore throat and trouble swallowing.    Eyes: Negative for visual disturbance.   Respiratory: Negative for cough and shortness of breath.    Cardiovascular: Negative for chest pain and leg swelling.   Gastrointestinal: Negative for abdominal pain, blood in stool, diarrhea and vomiting.   Endocrine: Negative.    Genitourinary: Negative for decreased urine volume and frequency.   Musculoskeletal: Positive for arthralgias (r elbow). Negative for neck pain.   Skin: Negative for rash.   Allergic/Immunologic: Negative.    Neurological: Negative for weakness and numbness.   Hematological: Negative.  Does not bruise/bleed easily.   Psychiatric/Behavioral: Negative.    All other systems reviewed and are negative.      PHYSICAL EXAM  ED Triage Vitals   Temp Heart Rate Resp BP SpO2   10/16/18 1358 10/16/18 1358 10/16/18 1406 10/16/18 1407 10/16/18 1358   97.5 °F (36.4 °C) 73 17 159/87 98 %      Temp src Heart Rate Source Patient Position BP Location FiO2 (%)   10/16/18 1358 10/16/18 1358 10/16/18 1407 10/16/18 1407 --   Tympanic Monitor Sitting Right arm        Physical Exam   Constitutional: He is oriented to person, place, and time.  Non-toxic appearance. He appears distressed.   HENT:   Head: Normocephalic and atraumatic.   Eyes: EOM are normal.   Neck: Normal range of motion.   Cardiovascular: Normal rate, regular rhythm and normal heart sounds.    No murmur heard.  Pulses:       Posterior tibial pulses are 2+ on the right side, and 2+ on the left side.   Pulmonary/Chest: Effort normal and breath sounds normal. No respiratory distress. He has no wheezes.   Abdominal: Soft. Bowel sounds are normal. There is  no tenderness. There is no rebound and no guarding.   Musculoskeletal: Normal range of motion. He exhibits no edema.        Right elbow: He exhibits no swelling. No tenderness (not TTP) found.   No erythema or warmth in R elbow    Neurological: He is alert and oriented to person, place, and time.   Skin: Skin is warm and dry.   Psychiatric: Affect normal.   Nursing note and vitals reviewed.      RADIOLOGY  XR Elbow 2 View Right   Preliminary Result   1. Old elbow trauma with fractures and surgical repair.   2. No acute fractures or other acute findings are noted. One of the   surgical screws in the distal humerus is fractured.               I ordered the above noted radiological studies. Interpreted by radiologist. Reviewed by me in PACS.       PROCEDURES  Procedures      PROGRESS AND CONSULTS  ED Course as of Oct 16 1726   Tue Oct 16, 2018   1426 Atraumatic right elbow pain.  Previous fracture 2017.   [EE]      ED Course User Index  [EE] Ashish Nesbitt PA     5:17 PM  Advised pt to use ice and Tylenol. Discussed plan to discharge and have pt f/u with ortho. Pt understands and agrees with the plan, all questions answered. Pt denies pain medication, will take Tylenol at home. Showed the patient his xrays and pointed out broken screw in distal humerus    5:21 PM  Ice and sling ordered for RUE.       MEDICAL DECISION MAKING  Results were reviewed/discussed with the patient and they were also made aware of online access. Pt also made aware that some labs, such as cultures, will not be resulted during ER visit and follow up with PMD is necessary.     MDM  Number of Diagnoses or Management Options     Amount and/or Complexity of Data Reviewed  Tests in the radiology section of CPT®: reviewed (XR right elbow-old trauma with surgical repair, no new fracture, a surgical screw in distal humerus is fractured )  Decide to obtain previous medical records or to obtain history from someone other than the patient: yes  Review and  summarize past medical records: yes           DIAGNOSIS  Final diagnoses:   Arthralgia of right elbow       DISPOSITION  DISCHARGE    Patient discharged in stable condition.    Reviewed implications of results, diagnosis, meds, responsibility to follow up, warning signs and symptoms of possible worsening, potential complications and reasons to return to ER.    Patient/Family voiced understanding of above instructions.    Discussed plan for discharge, as there is no emergent indication for admission. Patient referred to primary care provider for BP management due to today's BP. Pt/family is agreeable and understands need for follow up and repeat testing.  Pt is aware that discharge does not mean that nothing is wrong but it indicates no emergency is present that requires admission and they must continue care with follow-up as given below or physician of their choice.     FOLLOW-UP  [facility]  Francisco Ordaz MD  [phone]  [address] [place] [address] [address]  [place] [address]  In 2 weeks  As needed    Ellis Meredith MD  [address] [facility] [address]  [place] [address]  [phone]    Schedule an appointment as soon as possible for a visit in 2 weeks  As needed         Medication List      Changed    furosemide 20 MG tablet  Commonly known as:  LASIX  TAKE ONE TABLET BY MOUTH DAILY  What changed:  See the new instructions.     * glucose blood test strip  What changed:  Another medication with the same name was removed. Continue   taking this medication, and follow the directions you see here.     * ACCU-CHEK IFEANYI PLUS test strip  Generic drug:  glucose blood  TEST BLOOD SUGAR 3 TO 4 TIMES A DAY  What changed:  Another medication with the same name was removed. Continue   taking this medication, and follow the directions you see here.     insulin degludec 100 UNIT/ML solution pen-injector injection  Commonly known as:  TRESIBA FLEXTOUCH  Inject 50 Units under the skin Daily.  What changed:  how much  to take     * metoprolol succinate XL 25 MG 24 hr tablet  Commonly known as:  TOPROL-XL  Take 1 tablet by mouth Daily.  What changed:  Another medication with the same name was removed. Continue   taking this medication, and follow the directions you see here.     * metoprolol succinate XL 25 MG 24 hr tablet  Commonly known as:  TOPROL-XL  TAKE ONE TABLET BY MOUTH DAILY  What changed:  Another medication with the same name was removed. Continue   taking this medication, and follow the directions you see here.        * This list has 4 medication(s) that are the same as other medications   prescribed for you. Read the directions carefully, and ask your doctor or   other care provider to review them with you.                  Latest Documented Vital Signs:  As of 5:26 PM  BP- 159/87 HR- 73 Temp- 97.5 °F (36.4 °C) (Tympanic) O2 sat- 98%    --  Documentation assistance provided by jordan Ricardo for Dr Garcia.  Information recorded by the scribe was done at my direction and has been verified and validated by me.         Naomi Ricardo  10/16/18 6538       Balbina Garcia MD  10/21/18 5817

## 2018-10-16 NOTE — ED TRIAGE NOTES
Pt broke elbow a couple years ago. 2 days ago, pt started having severe right elbow pain. No known injury.

## 2018-10-22 RX ORDER — WARFARIN SODIUM 5 MG/1
TABLET ORAL
Qty: 45 TABLET | Refills: 0 | Status: SHIPPED | OUTPATIENT
Start: 2018-10-22 | End: 2018-11-26 | Stop reason: SDUPTHER

## 2018-10-24 ENCOUNTER — ANTICOAGULATION VISIT (OUTPATIENT)
Dept: PHARMACY | Facility: HOSPITAL | Age: 83
End: 2018-10-24

## 2018-10-24 DIAGNOSIS — Z95.2 HX OF AORTIC VALVE REPLACEMENT, MECHANICAL: ICD-10-CM

## 2018-10-24 LAB
INR PPP: 3.6 (ref 0.91–1.09)
PROTHROMBIN TIME: 43.8 SECONDS (ref 10–13.8)

## 2018-10-24 PROCEDURE — 85610 PROTHROMBIN TIME: CPT

## 2018-10-24 PROCEDURE — 36416 COLLJ CAPILLARY BLOOD SPEC: CPT

## 2018-10-24 RX ORDER — ATORVASTATIN CALCIUM 20 MG/1
TABLET, FILM COATED ORAL
Qty: 90 TABLET | Refills: 0 | Status: SHIPPED | OUTPATIENT
Start: 2018-10-24 | End: 2019-01-20 | Stop reason: SDUPTHER

## 2018-10-24 NOTE — PROGRESS NOTES
Anticoagulation Clinic Progress Note    Anticoagulation Summary  As of 10/24/2018    INR goal:   2.5-3.5   TTR:   42.3 % (3.6 wk)   Today's INR:   3.6!   Warfarin maintenance plan:   5 mg on Mon, Wed, Fri; 7.5 mg all other days   Weekly warfarin total:   45 mg   No change documented:   Angie Sykes   Plan last modified:   Marisol Merida RPH (9/19/2018)   Next INR check:   11/21/2018   Priority:   Maintenance   Target end date:   Indefinite    Indications    Atrial fibrillation (CMS/HCC) [I48.91]  Hx of aortic valve replacement  mechanical [Z95.2] [Z95.2]             Anticoagulation Episode Summary     INR check location:       Preferred lab:       Send INR reminders to:    [facility] GUTIERRE[facility]    Comments:         Anticoagulation Care Providers     Provider Role Specialty Phone number    Griffin Fried MD Referring Cardiology [phone]    Marisol Merida RPH Responsible Pharmacy           Drug interactions: has remained unchanged.  Diet: has remained unchanged.    Clinic Interview:      INR History:  Anticoagulation Monitoring 10/3/2018 10/10/2018 10/24/2018   INR 4.3 3.3 3.6   INR Date 10/3/2018 10/10/2018 10/24/2018   INR Goal 2.5-3.5 2.5-3.5 2.5-3.5   Trend Same Same Same   Last Week Total 45 mg 40 mg 45 mg   Next Week Total 40 mg 45 mg 45 mg   Sun 7.5 mg 7.5 mg 7.5 mg   Mon 5 mg 5 mg 5 mg   Tue 7.5 mg 7.5 mg 7.5 mg   Wed Hold (10/3) 5 mg 5 mg   Thu 7.5 mg 7.5 mg 7.5 mg   Fri 5 mg 5 mg 5 mg   Sat 7.5 mg 7.5 mg 7.5 mg   Visit Report - - -   Some recent data might be hidden       Plan:  1. INR is therapeutic today- see above in Anticoagulation Summary.   Will instruct Francisco Sequeira to continue their warfarin regimen- see above in Anticoagulation Summary.  2. Follow up in 4 weeks.  3. Patient declines warfarin refills.  4. Verbal and written information provided. Patient expresses understanding and has no further questions at this time.    Angie Sykes

## 2018-11-01 ENCOUNTER — OFFICE VISIT (OUTPATIENT)
Dept: FAMILY MEDICINE CLINIC | Facility: CLINIC | Age: 83
End: 2018-11-01

## 2018-11-01 VITALS
HEART RATE: 76 BPM | WEIGHT: 181 LBS | BODY MASS INDEX: 24.52 KG/M2 | DIASTOLIC BLOOD PRESSURE: 72 MMHG | OXYGEN SATURATION: 98 % | TEMPERATURE: 98 F | SYSTOLIC BLOOD PRESSURE: 130 MMHG | HEIGHT: 72 IN

## 2018-11-01 DIAGNOSIS — E11.649 UNCONTROLLED TYPE 2 DIABETES MELLITUS WITH HYPOGLYCEMIA WITHOUT COMA (HCC): Primary | ICD-10-CM

## 2018-11-01 DIAGNOSIS — Z12.5 SPECIAL SCREENING FOR MALIGNANT NEOPLASM OF PROSTATE: ICD-10-CM

## 2018-11-01 DIAGNOSIS — K21.9 GASTROESOPHAGEAL REFLUX DISEASE WITHOUT ESOPHAGITIS: ICD-10-CM

## 2018-11-01 DIAGNOSIS — I10 ESSENTIAL HYPERTENSION: ICD-10-CM

## 2018-11-01 DIAGNOSIS — E78.5 HYPERLIPIDEMIA, UNSPECIFIED HYPERLIPIDEMIA TYPE: ICD-10-CM

## 2018-11-01 DIAGNOSIS — Z13.0 SCREENING FOR DISORDER OF BLOOD AND BLOOD-FORMING ORGANS: ICD-10-CM

## 2018-11-01 PROBLEM — J32.9 SINUSITIS: Status: RESOLVED | Noted: 2018-05-01 | Resolved: 2018-11-01

## 2018-11-01 LAB — HBA1C MFR BLD: 8 %

## 2018-11-01 PROCEDURE — 83036 HEMOGLOBIN GLYCOSYLATED A1C: CPT | Performed by: NURSE PRACTITIONER

## 2018-11-01 PROCEDURE — 99214 OFFICE O/P EST MOD 30 MIN: CPT | Performed by: NURSE PRACTITIONER

## 2018-11-01 NOTE — PROGRESS NOTES
"Subjective   Francisco Sequeiar is a 82 y.o. male.     History of Present Illness   Francisco Sequeira 82 y.o. male who presents today for routine follow up check and medication refills.  he has a history of   Patient Active Problem List   Diagnosis   • Atrial fibrillation (CMS/HCC)   • Hypertension   • Cardiomyopathy (CMS/HCC)   • Atopic rhinitis   • Uncontrolled type 2 diabetes mellitus (CMS/HCC)   • Gastroesophageal reflux disease   • Hyperlipidemia   • Renal insufficiency   • Low testosterone   • Special screening for malignant neoplasm of prostate   • Hx of mitral valve replacement with mechanical valve [Z95.2]   • Hx of aortic valve replacement, mechanical [Z95.2]   • Screening for disorder of blood and blood-forming organs   .  Since the last visit, he has overall felt well.  He has Hypertenision and is well controlled on medication, DMII and is here to discuss recent abnormal labs, GERD and is well controlled on PPI medication and needing labs for lipids.  he has been compliant with current medications have reviewed them.  The patient denies medication side effects.    Results for orders placed or performed in visit on 11/01/18   POC Glycosylated Hemoglobin (Hb A1C)   Result Value Ref Range    Hemoglobin A1C 8.0 %         The following portions of the patient's history were reviewed and updated as appropriate: allergies, current medications, past social history and problem list.    Review of Systems   All other systems reviewed and are negative.      Objective   /72 (BP Location: Right arm, Patient Position: Sitting)   Pulse 76   Temp 98 °F (36.7 °C)   Ht 182.9 cm (72.01\")   Wt 82.1 kg (181 lb)   SpO2 98%   BMI 24.54 kg/m²   Physical Exam   Constitutional: He is oriented to person, place, and time. Vital signs are normal. He appears well-developed and well-nourished. No distress.   HENT:   Head: Normocephalic.   Cardiovascular: Normal rate, regular rhythm and normal heart sounds.    Pulmonary/Chest: " Effort normal and breath sounds normal.   Neurological: He is alert and oriented to person, place, and time. Gait normal.   Psychiatric: He has a normal mood and affect. His behavior is normal. Judgment and thought content normal.   Vitals reviewed.      Assessment/Plan      Diagnosis Plan   1. Uncontrolled type 2 diabetes mellitus with hypoglycemia without coma (CMS/HCC)  POC Glycosylated Hemoglobin (Hb A1C)   2. Essential hypertension     3. Hyperlipidemia, unspecified hyperlipidemia type  Comprehensive Metabolic Panel    Lipid Panel With LDL / HDL Ratio   4. Gastroesophageal reflux disease without esophagitis     5. Screening for disorder of blood and blood-forming organs  CBC & Differential   6. Special screening for malignant neoplasm of prostate  PSA Screen     rto in 3 mons  Follow up after labs   LIZA Trinh  11/1/2018

## 2018-11-02 LAB
ALBUMIN SERPL-MCNC: 4.1 G/DL (ref 3.5–5.2)
ALBUMIN/GLOB SERPL: 1.5 G/DL
ALP SERPL-CCNC: 94 U/L (ref 39–117)
ALT SERPL-CCNC: 25 U/L (ref 1–41)
AST SERPL-CCNC: 23 U/L (ref 1–40)
BASOPHILS # BLD AUTO: 0.02 10*3/MM3 (ref 0–0.2)
BASOPHILS NFR BLD AUTO: 0.4 % (ref 0–1.5)
BILIRUB SERPL-MCNC: 0.6 MG/DL (ref 0.1–1.2)
BUN SERPL-MCNC: 27 MG/DL (ref 8–23)
BUN/CREAT SERPL: 15.9 (ref 7–25)
CALCIUM SERPL-MCNC: 9.2 MG/DL (ref 8.6–10.5)
CHLORIDE SERPL-SCNC: 103 MMOL/L (ref 98–107)
CHOLEST SERPL-MCNC: 165 MG/DL (ref 0–200)
CO2 SERPL-SCNC: 27.9 MMOL/L (ref 22–29)
CREAT SERPL-MCNC: 1.7 MG/DL (ref 0.76–1.27)
DIFFERENTIAL COMMENT: NORMAL
EOSINOPHIL # BLD AUTO: 0.12 10*3/MM3 (ref 0–0.7)
EOSINOPHIL NFR BLD AUTO: 2.3 % (ref 0.3–6.2)
ERYTHROCYTE [DISTWIDTH] IN BLOOD BY AUTOMATED COUNT: 15.4 % (ref 11.5–14.5)
GLOBULIN SER CALC-MCNC: 2.8 GM/DL
GLUCOSE SERPL-MCNC: 95 MG/DL (ref 65–99)
HCT VFR BLD AUTO: 50.2 % (ref 40.4–52.2)
HDLC SERPL-MCNC: 34 MG/DL (ref 40–60)
HGB BLD-MCNC: 15.4 G/DL (ref 13.7–17.6)
IMM GRANULOCYTES # BLD: 0 10*3/MM3 (ref 0–0.03)
IMM GRANULOCYTES NFR BLD: 0 % (ref 0–0.5)
LDLC SERPL CALC-MCNC: 93 MG/DL (ref 0–100)
LDLC/HDLC SERPL: 2.74 {RATIO}
LYMPHOCYTES # BLD AUTO: 1.07 10*3/MM3 (ref 0.9–4.8)
LYMPHOCYTES NFR BLD AUTO: 20.7 % (ref 19.6–45.3)
MCH RBC QN AUTO: 28.8 PG (ref 27–32.7)
MCHC RBC AUTO-ENTMCNC: 30.7 G/DL (ref 32.6–36.4)
MCV RBC AUTO: 93.8 FL (ref 79.8–96.2)
MONOCYTES # BLD AUTO: 0.46 10*3/MM3 (ref 0.2–1.2)
MONOCYTES NFR BLD AUTO: 8.9 % (ref 5–12)
NEUTROPHILS # BLD AUTO: 3.5 10*3/MM3 (ref 1.9–8.1)
NEUTROPHILS NFR BLD AUTO: 67.7 % (ref 42.7–76)
PLATELET # BLD AUTO: 91 10*3/MM3 (ref 140–500)
PLATELET BLD QL SMEAR: NORMAL
POTASSIUM SERPL-SCNC: 4.4 MMOL/L (ref 3.5–5.2)
PROT SERPL-MCNC: 6.9 G/DL (ref 6–8.5)
PSA SERPL-MCNC: 0.08 NG/ML (ref 0–4)
RBC # BLD AUTO: 5.35 10*6/MM3 (ref 4.6–6)
RBC MORPH BLD: NORMAL
SODIUM SERPL-SCNC: 143 MMOL/L (ref 136–145)
TRIGL SERPL-MCNC: 189 MG/DL (ref 0–150)
VLDLC SERPL CALC-MCNC: 37.8 MG/DL (ref 5–40)
WBC # BLD AUTO: 5.17 10*3/MM3 (ref 4.5–10.7)

## 2018-11-21 ENCOUNTER — ANTICOAGULATION VISIT (OUTPATIENT)
Dept: PHARMACY | Facility: HOSPITAL | Age: 83
End: 2018-11-21

## 2018-11-21 DIAGNOSIS — Z95.2 HX OF AORTIC VALVE REPLACEMENT, MECHANICAL: ICD-10-CM

## 2018-11-21 LAB
INR PPP: 3.4 (ref 0.91–1.09)
PROTHROMBIN TIME: 41.1 SECONDS (ref 10–13.8)

## 2018-11-21 PROCEDURE — 36416 COLLJ CAPILLARY BLOOD SPEC: CPT

## 2018-11-21 PROCEDURE — 85610 PROTHROMBIN TIME: CPT

## 2018-11-21 NOTE — PROGRESS NOTES
Anticoagulation Clinic Progress Note    Anticoagulation Summary  As of 11/21/2018    INR goal:   2.5-3.5   TTR:   46.4 % (1.8 mo)   INR used for dosing:   3.4 (11/21/2018)   Warfarin maintenance plan:   5 mg on Mon, Wed, Fri; 7.5 mg all other days   Weekly warfarin total:   45 mg   No change documented:   Angie Sykes   Plan last modified:   Marisol Merida RPH (9/19/2018)   Next INR check:   12/19/2018   Priority:   Maintenance   Target end date:   Indefinite    Indications    Atrial fibrillation (CMS/HCC) [I48.91]  Hx of aortic valve replacement  mechanical [Z95.2] [Z95.2]             Anticoagulation Episode Summary     INR check location:       Preferred lab:       Send INR reminders to:    [facility]    Comments:         Anticoagulation Care Providers     Provider Role Specialty Phone number    Griffin Fried MD Referring Cardiology [phone]    Marisol Merida RPH Responsible Pharmacy [phone]          Drug interactions: has remained unchanged.  Diet: has remained unchanged.    Clinic Interview:  Patient Findings     Negatives:   Signs/symptoms of thrombosis, Signs/symptoms of bleeding,   Laboratory test error suspected, Change in health, Change in alcohol use,   Change in activity, Upcoming invasive procedure, Emergency department   visit, Upcoming dental procedure, Missed doses, Extra doses, Change in   medications, Change in diet/appetite, Hospital admission, Bruising, Other   complaints      Clinical Outcomes     Negatives:   Major bleeding event, Thromboembolic event,   Anticoagulation-related hospital admission, Anticoagulation-related ED   visit, Anticoagulation-related fatality        INR History:  Anticoagulation Monitoring 10/10/2018 10/24/2018 11/21/2018   INR 3.3 3.6 3.4   INR Date 10/10/2018 10/24/2018 11/21/2018   INR Goal 2.5-3.5 2.5-3.5 2.5-3.5   Trend Same Same Same   Last Week Total 40 mg 45 mg 45 mg   Next Week Total 45 mg 45 mg 45 mg   Sun 7.5 mg 7.5 mg 7.5 mg    Mon 5 mg 5 mg 5 mg   Tue 7.5 mg 7.5 mg 7.5 mg   Wed 5 mg 5 mg 5 mg   Thu 7.5 mg 7.5 mg 7.5 mg   Fri 5 mg 5 mg 5 mg   Sat 7.5 mg 7.5 mg 7.5 mg   Visit Report - - -   Some recent data might be hidden       Plan:  1. INR is therapeutic today- see above in Anticoagulation Summary.   Will instruct Francisco BRENNAN Sequeira to continue their warfarin regimen- see above in Anticoagulation Summary.  2. Follow up in 4 weeks.  3. Patient declines warfarin refills.  4. Verbal and written information provided. Patient expresses understanding and has no further questions at this time.    Angie Sykes

## 2018-11-26 DIAGNOSIS — IMO0002 UNCONTROLLED TYPE 2 DIABETES MELLITUS WITH COMPLICATION, WITH LONG-TERM CURRENT USE OF INSULIN: ICD-10-CM

## 2018-11-26 RX ORDER — SUB-Q INSULIN DEVICE, 40 UNIT
EACH MISCELLANEOUS
Qty: 30 EACH | Refills: 5 | Status: SHIPPED | OUTPATIENT
Start: 2018-11-26 | End: 2019-05-23 | Stop reason: SDUPTHER

## 2018-11-27 RX ORDER — WARFARIN SODIUM 5 MG/1
TABLET ORAL
Qty: 45 TABLET | Refills: 0 | Status: SHIPPED | OUTPATIENT
Start: 2018-11-27 | End: 2018-12-26 | Stop reason: SDUPTHER

## 2018-12-03 RX ORDER — FUROSEMIDE 20 MG/1
TABLET ORAL
Qty: 90 TABLET | Refills: 0 | Status: SHIPPED | OUTPATIENT
Start: 2018-12-03 | End: 2019-02-28 | Stop reason: SDUPTHER

## 2018-12-11 RX ORDER — LANCETS
EACH MISCELLANEOUS
Qty: 102 EACH | Refills: 3 | Status: ON HOLD | OUTPATIENT
Start: 2018-12-11 | End: 2021-11-04

## 2018-12-19 ENCOUNTER — ANTICOAGULATION VISIT (OUTPATIENT)
Dept: PHARMACY | Facility: HOSPITAL | Age: 83
End: 2018-12-19

## 2018-12-19 DIAGNOSIS — Z95.2 HX OF AORTIC VALVE REPLACEMENT, MECHANICAL: ICD-10-CM

## 2018-12-19 LAB
INR PPP: 3.2 (ref 0.91–1.09)
PROTHROMBIN TIME: 38.8 SECONDS (ref 10–13.8)

## 2018-12-19 PROCEDURE — 36416 COLLJ CAPILLARY BLOOD SPEC: CPT

## 2018-12-19 PROCEDURE — 85610 PROTHROMBIN TIME: CPT

## 2018-12-19 NOTE — PROGRESS NOTES
Anticoagulation Clinic Progress Note    Anticoagulation Summary  As of 12/19/2018    INR goal:   2.5-3.5   TTR:   64.8 % (2.7 mo)   INR used for dosing:   3.2 (12/19/2018)   Warfarin maintenance plan:   5 mg on Mon, Wed, Fri; 7.5 mg all other days   Weekly warfarin total:   45 mg   No change documented:   Angie Sykes   Plan last modified:   Marisol Merida RPH (9/19/2018)   Next INR check:   1/16/2019   Priority:   Maintenance   Target end date:   Indefinite    Indications    Atrial fibrillation (CMS/HCC) [I48.91]  Hx of aortic valve replacement  mechanical [Z95.2] [Z95.2]             Anticoagulation Episode Summary     INR check location:       Preferred lab:       Send INR reminders to:    [facility]    Comments:         Anticoagulation Care Providers     Provider Role Specialty Phone number    Griffin Fried MD Referring Cardiology [phone]    Marisol Merida RPH Responsible Pharmacy [phone]          Clinic Interview:  Patient Findings     Negatives:   Signs/symptoms of thrombosis, Signs/symptoms of bleeding,   Laboratory test error suspected, Change in health, Change in alcohol use,   Change in activity, Upcoming invasive procedure, Emergency department   visit, Upcoming dental procedure, Missed doses, Extra doses, Change in   medications, Change in diet/appetite, Hospital admission, Bruising, Other   complaints      Clinical Outcomes     Negatives:   Major bleeding event, Thromboembolic event,   Anticoagulation-related hospital admission, Anticoagulation-related ED   visit, Anticoagulation-related fatality        INR History:  Anticoagulation Monitoring 10/24/2018 11/21/2018 12/19/2018   INR 3.6 3.4 3.2   INR Date 10/24/2018 11/21/2018 12/19/2018   INR Goal 2.5-3.5 2.5-3.5 2.5-3.5   Trend Same Same Same   Last Week Total 45 mg 45 mg 45 mg   Next Week Total 45 mg 45 mg 45 mg   Sun 7.5 mg 7.5 mg 7.5 mg   Mon 5 mg 5 mg 5 mg   Tue 7.5 mg 7.5 mg 7.5 mg   Wed 5 mg 5 mg 5 mg   Thu 7.5  mg 7.5 mg 7.5 mg   Fri 5 mg 5 mg 5 mg   Sat 7.5 mg 7.5 mg 7.5 mg   Visit Report - - -   Some recent data might be hidden       Plan:  1. INR is therapeutic today- see above in Anticoagulation Summary.   Will instruct Francisco Sequeira to continue their warfarin regimen- see above in Anticoagulation Summary.  2. Follow up in 4 weeks.  3. Patient declines warfarin refills.  4. Verbal and written information provided. Patient expresses understanding and has no further questions at this time.    Angie Sykes

## 2018-12-26 RX ORDER — WARFARIN SODIUM 5 MG/1
TABLET ORAL
Qty: 45 TABLET | Refills: 0 | Status: SHIPPED | OUTPATIENT
Start: 2018-12-26 | End: 2019-01-22 | Stop reason: SDUPTHER

## 2019-01-09 ENCOUNTER — TELEPHONE (OUTPATIENT)
Dept: CARDIOLOGY | Facility: CLINIC | Age: 84
End: 2019-01-09

## 2019-01-09 NOTE — TELEPHONE ENCOUNTER
01/09/19   Marilynn from Dr Suresh Harrington's office called for Clearance for a colonscopy schedule for 2/18/19. Need clearance to hold Coumadin for 3 days prior to procedure.   Please advise  Marilynn's  call back # [phone]  Or fax clearance to [phone] Attn: Marilynn.   Thanks Aquilino AMIN

## 2019-01-16 ENCOUNTER — ANTICOAGULATION VISIT (OUTPATIENT)
Dept: PHARMACY | Facility: HOSPITAL | Age: 84
End: 2019-01-16

## 2019-01-16 DIAGNOSIS — Z95.2 HX OF AORTIC VALVE REPLACEMENT, MECHANICAL: ICD-10-CM

## 2019-01-16 LAB
INR PPP: 3.1 (ref 0.91–1.09)
PROTHROMBIN TIME: 36.8 SECONDS (ref 10–13.8)

## 2019-01-16 PROCEDURE — 36416 COLLJ CAPILLARY BLOOD SPEC: CPT

## 2019-01-16 PROCEDURE — 85610 PROTHROMBIN TIME: CPT

## 2019-01-16 NOTE — PROGRESS NOTES
Anticoagulation Clinic Progress Note    Anticoagulation Summary  As of 1/16/2019    INR goal:   2.5-3.5   TTR:   73.8 % (3.6 mo)   INR used for dosing:   3.1 (1/16/2019)   Warfarin maintenance plan:   5 mg on Mon, Wed, Fri; 7.5 mg all other days   Weekly warfarin total:   45 mg   No change documented:   Angie Sykes   Plan last modified:   Marisol Merida RPH (9/19/2018)   Next INR check:   2/13/2019   Priority:   Maintenance   Target end date:   Indefinite    Indications    Atrial fibrillation (CMS/HCC) [I48.91]  Hx of aortic valve replacement  mechanical [Z95.2] [Z95.2]             Anticoagulation Episode Summary     INR check location:       Preferred lab:       Send INR reminders to:    [facility]    Comments:         Anticoagulation Care Providers     Provider Role Specialty Phone number    Griffin Fried MD Referring Cardiology [phone]    Marisol Merida RPH Responsible Pharmacy [phone]          Clinic Interview:  Patient Findings     Negatives:   Signs/symptoms of thrombosis, Signs/symptoms of bleeding,   Laboratory test error suspected, Change in health, Change in alcohol use,   Change in activity, Upcoming invasive procedure, Emergency department   visit, Upcoming dental procedure, Missed doses, Extra doses, Change in   medications, Change in diet/appetite, Hospital admission, Bruising, Other   complaints      Clinical Outcomes     Negatives:   Major bleeding event, Thromboembolic event,   Anticoagulation-related hospital admission, Anticoagulation-related ED   visit, Anticoagulation-related fatality        INR History:  Anticoagulation Monitoring 11/21/2018 12/19/2018 1/16/2019   INR 3.4 3.2 3.1   INR Date 11/21/2018 12/19/2018 1/16/2019   INR Goal 2.5-3.5 2.5-3.5 2.5-3.5   Trend Same Same Same   Last Week Total 45 mg 45 mg 45 mg   Next Week Total 45 mg 45 mg 45 mg   Sun 7.5 mg 7.5 mg 7.5 mg   Mon 5 mg 5 mg 5 mg   Tue 7.5 mg 7.5 mg 7.5 mg   Wed 5 mg 5 mg 5 mg   Thu 7.5 mg  7.5 mg 7.5 mg   Fri 5 mg 5 mg 5 mg   Sat 7.5 mg 7.5 mg 7.5 mg   Visit Report - - -   Some recent data might be hidden       Plan:  1. INR is therapeutic today- see above in Anticoagulation Summary.   Will instruct Francisco Sequeira to continue their warfarin regimen- see above in Anticoagulation Summary.  2. Follow up in 4 weeks.  3. Patient declines warfarin refills.  4. Verbal and written information provided. Patient expresses understanding and has no further questions at this time.    Angie Sykes

## 2019-01-17 RX ORDER — DIGOXIN 125 UG/1
TABLET ORAL
Qty: 90 TABLET | Refills: 0 | Status: SHIPPED | OUTPATIENT
Start: 2019-01-17 | End: 2019-04-14 | Stop reason: SDUPTHER

## 2019-01-21 RX ORDER — ATORVASTATIN CALCIUM 20 MG/1
TABLET, FILM COATED ORAL
Qty: 90 TABLET | Refills: 0 | Status: SHIPPED | OUTPATIENT
Start: 2019-01-21 | End: 2019-04-17 | Stop reason: SDUPTHER

## 2019-01-22 RX ORDER — WARFARIN SODIUM 5 MG/1
TABLET ORAL
Qty: 45 TABLET | Refills: 0 | Status: SHIPPED | OUTPATIENT
Start: 2019-01-22 | End: 2019-02-18 | Stop reason: SDUPTHER

## 2019-02-01 ENCOUNTER — OFFICE VISIT (OUTPATIENT)
Dept: FAMILY MEDICINE CLINIC | Facility: CLINIC | Age: 84
End: 2019-02-01

## 2019-02-01 VITALS
TEMPERATURE: 98.9 F | WEIGHT: 183.8 LBS | SYSTOLIC BLOOD PRESSURE: 134 MMHG | OXYGEN SATURATION: 99 % | BODY MASS INDEX: 24.89 KG/M2 | HEIGHT: 72 IN | DIASTOLIC BLOOD PRESSURE: 70 MMHG | HEART RATE: 60 BPM

## 2019-02-01 DIAGNOSIS — I10 ESSENTIAL HYPERTENSION: ICD-10-CM

## 2019-02-01 DIAGNOSIS — E78.5 HYPERLIPIDEMIA, UNSPECIFIED HYPERLIPIDEMIA TYPE: ICD-10-CM

## 2019-02-01 DIAGNOSIS — E11.649 UNCONTROLLED TYPE 2 DIABETES MELLITUS WITH HYPOGLYCEMIA WITHOUT COMA (HCC): Primary | ICD-10-CM

## 2019-02-01 LAB — HBA1C MFR BLD: 8 %

## 2019-02-01 PROCEDURE — 99214 OFFICE O/P EST MOD 30 MIN: CPT | Performed by: NURSE PRACTITIONER

## 2019-02-01 PROCEDURE — 83036 HEMOGLOBIN GLYCOSYLATED A1C: CPT | Performed by: NURSE PRACTITIONER

## 2019-02-01 RX ORDER — LANCETS 21 GAUGE
EACH MISCELLANEOUS
Qty: 200 EACH | Refills: 3 | Status: ON HOLD | OUTPATIENT
Start: 2019-02-01 | End: 2021-11-04

## 2019-02-01 NOTE — PROGRESS NOTES
"Subjective   Francisco Sequeira is a 83 y.o. male.     History of Present Illness   Francisco Sequeira 83 y.o. male who presents today for routine follow up check and medication refills.  he has a history of   Patient Active Problem List   Diagnosis   • Atrial fibrillation (CMS/HCC)   • Hypertension   • Cardiomyopathy (CMS/HCC)   • Atopic rhinitis   • Uncontrolled type 2 diabetes mellitus (CMS/HCC)   • Gastroesophageal reflux disease   • Hyperlipidemia   • Renal insufficiency   • Low testosterone   • Special screening for malignant neoplasm of prostate   • Hx of mitral valve replacement with mechanical valve [Z95.2]   • Hx of aortic valve replacement, mechanical [Z95.2]   • Screening for disorder of blood and blood-forming organs   .  Since the last visit, he has overall felt well.  He has Hypertenision and is well controlled on medication, DMII and is well controlled on medication and Hyperlipidemia and is well controlled on medication.  he has been compliant with current medications have reviewed them.  The patient denies medication side effects.    Results for orders placed or performed in visit on 02/01/19   POC Glycosylated Hemoglobin (Hb A1C)   Result Value Ref Range    Hemoglobin A1C 8.0 %         The following portions of the patient's history were reviewed and updated as appropriate: allergies, current medications, past social history and problem list.    Review of Systems   All other systems reviewed and are negative.      Objective   /70 (BP Location: Left arm, Patient Position: Sitting)   Pulse 60   Temp 98.9 °F (37.2 °C)   Ht 182.9 cm (72.01\")   Wt 83.4 kg (183 lb 12.8 oz)   SpO2 99%   BMI 24.92 kg/m²   Physical Exam   Constitutional: He is oriented to person, place, and time. Vital signs are normal. He appears well-developed and well-nourished. No distress.   HENT:   Head: Normocephalic.   Cardiovascular: Normal rate, regular rhythm and normal heart sounds.   Pulmonary/Chest: Effort normal and " breath sounds normal.   Neurological: He is alert and oriented to person, place, and time. Gait normal.   Psychiatric: He has a normal mood and affect. His behavior is normal. Judgment and thought content normal.   Vitals reviewed.      Assessment/Plan      Diagnosis Plan   1. Uncontrolled type 2 diabetes mellitus with hypoglycemia without coma (CMS/HCC)  POC Glycosylated Hemoglobin (Hb A1C)   2. Essential hypertension     3. Hyperlipidemia, unspecified hyperlipidemia type       Cont same   rto in 3 [facility]   Rubin Hinkle, APRN  2/1/2019

## 2019-02-18 RX ORDER — WARFARIN SODIUM 5 MG/1
TABLET ORAL
Qty: 45 TABLET | Refills: 0 | Status: SHIPPED | OUTPATIENT
Start: 2019-02-18 | End: 2019-03-16 | Stop reason: SDUPTHER

## 2019-02-25 ENCOUNTER — ANTICOAGULATION VISIT (OUTPATIENT)
Dept: PHARMACY | Facility: HOSPITAL | Age: 84
End: 2019-02-25

## 2019-02-25 DIAGNOSIS — Z95.2 HX OF AORTIC VALVE REPLACEMENT, MECHANICAL: ICD-10-CM

## 2019-02-25 LAB
INR PPP: 1.9 (ref 0.91–1.09)
PROTHROMBIN TIME: 23 SECONDS (ref 10–13.8)

## 2019-02-25 PROCEDURE — 85610 PROTHROMBIN TIME: CPT

## 2019-02-25 PROCEDURE — 36416 COLLJ CAPILLARY BLOOD SPEC: CPT

## 2019-02-25 PROCEDURE — G0463 HOSPITAL OUTPT CLINIC VISIT: HCPCS

## 2019-02-25 NOTE — PROGRESS NOTES
Anticoagulation Clinic Progress Note    Anticoagulation Summary  As of 2019    INR goal:   2.5-3.5   TTR:   67.4 % (5 mo)   INR used for dosin.9! (2019)   Warfarin maintenance plan:   5 mg on Mon, Wed, Fri; 7.5 mg all other days   Weekly warfarin total:   45 mg   Plan last modified:   Marisol Merida RPH (2018)   Next INR check:   3/18/2019   Priority:   Maintenance   Target end date:   Indefinite    Indications    Atrial fibrillation (CMS/HCC) [I48.91]  Hx of aortic valve replacement  mechanical [Z95.2] [Z95.2]             Anticoagulation Episode Summary     INR check location:       Preferred lab:       Send INR reminders to:    [facility]    Comments:         Anticoagulation Care Providers     Provider Role Specialty Phone number    Griffin Fried MD Referring Cardiology [phone]    Marisol Merida RPH Responsible Pharmacy [phone]          Clinic Interview:      INR History:  Anticoagulation Monitoring 2018   INR 3.2 3.1 1.9   INR Date 2018   INR Goal 2.5-3.5 2.5-3.5 2.5-3.5   Trend Same Same Same   Last Week Total 45 mg 45 mg 45 mg   Next Week Total 45 mg 45 mg 50 mg   Sun 7.5 mg 7.5 mg 7.5 mg   Mon 5 mg 5 mg 10 mg (); Otherwise 5 mg   Tue 7.5 mg 7.5 mg 7.5 mg   Wed 5 mg 5 mg 5 mg   Thu 7.5 mg 7.5 mg 7.5 mg   Fri 5 mg 5 mg 5 mg   Sat 7.5 mg 7.5 mg 7.5 mg   Visit Report - - -   Some recent data might be hidden       Plan:  1. INR is Subtherapeutic today- see above in Anticoagulation Summary. Held x 3d for colonoscopy.  Will instruct Francisco Sequeira to Continue their warfarin regimen- see above in Anticoagulation Summary.  Will take 10mg today as not back in range yet after hold prior to colonoscopy.  2. Follow up in 3 weeks  3. Patient declines warfarin refills.  4. Verbal and written information provided. Patient expresses understanding and has no further questions at this time.    Audra Vazquez [facility]

## 2019-03-01 ENCOUNTER — TELEPHONE (OUTPATIENT)
Dept: CARDIOLOGY | Facility: CLINIC | Age: 84
End: 2019-03-01

## 2019-03-01 RX ORDER — FUROSEMIDE 20 MG/1
TABLET ORAL
Qty: 90 TABLET | Refills: 2 | Status: SHIPPED | OUTPATIENT
Start: 2019-03-01 | End: 2019-11-23 | Stop reason: SDUPTHER

## 2019-03-01 NOTE — TELEPHONE ENCOUNTER
Dr Menendez notified.  Also gave him the number to the med management pharmacy to assist with obtaining INR before extraction.  Sandi Loco RN  Triage nurse

## 2019-03-01 NOTE — TELEPHONE ENCOUNTER
03/01/19  2:40 PM  Francisco Sequeira  1935    Home Phone [phone]   Mobile [phone]       Francisco Sequeira is a patient of Dr Fried.  Dr Emigdio Menendez is calling in to see if pt will need to hold his Warfarin for a tooth extraction, not yet scheduled.    If patient can hold warfarin, how long?    Dr Menendez can be reached on his cell phone [phone]    Sandi Loco RN  Triage nurse

## 2019-03-01 NOTE — TELEPHONE ENCOUNTER
Spoke with Dr. Menendez and likely will not hold warfarin for extraction of single tooth.  Let him know that patient can get an INR in our clinic prior to extraction as walk in if needed.

## 2019-03-05 ENCOUNTER — ANTICOAGULATION VISIT (OUTPATIENT)
Dept: PHARMACY | Facility: HOSPITAL | Age: 84
End: 2019-03-05

## 2019-03-05 DIAGNOSIS — Z95.2 HX OF AORTIC VALVE REPLACEMENT, MECHANICAL: ICD-10-CM

## 2019-03-05 LAB
INR PPP: 3.2 (ref 0.91–1.09)
PROTHROMBIN TIME: 38.1 SECONDS (ref 10–13.8)

## 2019-03-05 PROCEDURE — 36416 COLLJ CAPILLARY BLOOD SPEC: CPT

## 2019-03-05 PROCEDURE — 85610 PROTHROMBIN TIME: CPT

## 2019-03-05 NOTE — PROGRESS NOTES
Anticoagulation Clinic Progress Note    Anticoagulation Summary  As of 3/5/2019    INR goal:   2.5-3.5   TTR:   66.8 % (5.2 mo)   INR used for dosing:   3.2 (3/5/2019)   Warfarin maintenance plan:   5 mg on Mon, Wed, Fri; 7.5 mg all other days   Weekly warfarin total:   45 mg   No change documented:   Mathieu Moser RPH   Plan last modified:   Marisol Merida RPH (9/19/2018)   Next INR check:   4/2/2019   Priority:   Maintenance   Target end date:   Indefinite    Indications    Atrial fibrillation (CMS/HCC) [I48.91]  Hx of aortic valve replacement  mechanical [Z95.2] [Z95.2]             Anticoagulation Episode Summary     INR check location:       Preferred lab:       Send INR reminders to:    [facility]    Comments:         Anticoagulation Care Providers     Provider Role Specialty Phone number    Griffin Fried MD Referring Cardiology [phone]          Clinic Interview:  Patient Findings     Negatives:   Signs/symptoms of thrombosis, Signs/symptoms of bleeding,   Laboratory test error suspected, Change in health, Change in alcohol use,   Change in activity, Upcoming invasive procedure, Emergency department   visit, Upcoming dental procedure, Missed doses, Extra doses, Change in   medications, Change in diet/appetite, Hospital admission, Bruising, Other   complaints      Clinical Outcomes     Negatives:   Major bleeding event, Thromboembolic event,   Anticoagulation-related hospital admission, Anticoagulation-related ED   visit, Anticoagulation-related fatality        INR History:  Anticoagulation Monitoring 1/16/2019 2/25/2019 3/5/2019   INR 3.1 1.9 3.2   INR Date 1/16/2019 2/25/2019 3/5/2019   INR Goal 2.5-3.5 2.5-3.5 2.5-3.5   Trend Same Same Same   Last Week Total 45 mg 45 mg 45 mg   Next Week Total 45 mg 50 mg 45 mg   Sun 7.5 mg 7.5 mg 7.5 mg   Mon 5 mg 10 mg (2/25); Otherwise 5 mg 5 mg   Tue 7.5 mg 7.5 mg 7.5 mg   Wed 5 mg 5 mg 5 mg   Thu 7.5 mg 7.5 mg 7.5 mg   Fri 5 mg 5 mg 5 mg    Sat 7.5 mg 7.5 mg 7.5 mg   Visit Report - - -   Some recent data might be hidden       Plan:  1. INR is therapeutic today- see above in Anticoagulation Summary.   Will instruct Francisco Sequeira to continue their warfarin regimen- see above in Anticoagulation Summary.  2. Follow up in 4 weeks.  3. Patient declines warfarin refills.  4. Verbal and written information provided. Patient expresses understanding and has no further questions at this time.    Angie Sykes

## 2019-03-18 RX ORDER — WARFARIN SODIUM 5 MG/1
TABLET ORAL
Qty: 45 TABLET | Refills: 0 | Status: SHIPPED | OUTPATIENT
Start: 2019-03-18 | End: 2019-04-07 | Stop reason: SDUPTHER

## 2019-04-02 ENCOUNTER — ANTICOAGULATION VISIT (OUTPATIENT)
Dept: PHARMACY | Facility: HOSPITAL | Age: 84
End: 2019-04-02

## 2019-04-02 DIAGNOSIS — Z95.2 HX OF AORTIC VALVE REPLACEMENT, MECHANICAL: ICD-10-CM

## 2019-04-02 LAB
INR PPP: 3.3 (ref 0.91–1.09)
PROTHROMBIN TIME: 39.7 SECONDS (ref 10–13.8)

## 2019-04-02 PROCEDURE — 36416 COLLJ CAPILLARY BLOOD SPEC: CPT

## 2019-04-02 PROCEDURE — 85610 PROTHROMBIN TIME: CPT

## 2019-04-02 NOTE — PROGRESS NOTES
Anticoagulation Clinic Progress Note    Anticoagulation Summary  As of 4/2/2019    INR goal:   2.5-3.5   TTR:   71.8 % (6.2 mo)   INR used for dosing:   3.3 (4/2/2019)   Warfarin maintenance plan:   5 mg every Mon, Wed, Fri; 7.5 mg all other days   Weekly warfarin total:   45 mg   No change documented:   Natty Sullivan   Plan last modified:   Marisol Merida RPH (9/19/2018)   Next INR check:   4/30/2019   Priority:   Maintenance   Target end date:   Indefinite    Indications    Atrial fibrillation (CMS/HCC) [I48.91]  Hx of aortic valve replacement  mechanical [Z95.2] [Z95.2]             Anticoagulation Episode Summary     INR check location:       Preferred lab:       Send INR reminders to:    [facility]    Comments:         Anticoagulation Care Providers     Provider Role Specialty Phone number    Griffin Fried MD Referring Cardiology [phone]          Clinic Interview:  Patient Findings     Negatives:   Signs/symptoms of thrombosis, Signs/symptoms of bleeding,   Laboratory test error suspected, Change in health, Change in alcohol use,   Change in activity, Upcoming invasive procedure, Emergency department   visit, Upcoming dental procedure, Missed doses, Extra doses, Change in   medications, Change in diet/appetite, Hospital admission, Bruising, Other   complaints      Clinical Outcomes     Negatives:   Major bleeding event, Thromboembolic event,   Anticoagulation-related hospital admission, Anticoagulation-related ED   visit, Anticoagulation-related fatality        INR History:  Anticoagulation Monitoring 2/25/2019 3/5/2019 4/2/2019   INR 1.9 3.2 3.3   INR Date 2/25/2019 3/5/2019 4/2/2019   INR Goal 2.5-3.5 2.5-3.5 2.5-3.5   Trend Same Same Same   Last Week Total 45 mg 45 mg 45 mg   Next Week Total 50 mg 45 mg 45 mg   Sun 7.5 mg 7.5 mg 7.5 mg   Mon 10 mg (2/25); Otherwise 5 mg 5 mg 5 mg   Tue 7.5 mg 7.5 mg 7.5 mg   Wed 5 mg 5 mg 5 mg   Thu 7.5 mg 7.5 mg 7.5 mg   Fri 5 mg 5 mg 5  mg   Sat 7.5 mg 7.5 mg 7.5 mg   Visit Report - - -   Some recent data might be hidden       Plan:  1. INR is therapeutic today- see above in Anticoagulation Summary.   Will instruct Francisco Sequeira to continue their warfarin regimen- see above in Anticoagulation Summary.  2. Follow up in 4 weeks.  3. Patient declines warfarin refills.  4. Verbal and written information provided. Patient expresses understanding and has no further questions at this time.    Natty Sullivan

## 2019-04-08 RX ORDER — WARFARIN SODIUM 5 MG/1
TABLET ORAL
Qty: 45 TABLET | Refills: 0 | Status: SHIPPED | OUTPATIENT
Start: 2019-04-08 | End: 2019-05-04 | Stop reason: SDUPTHER

## 2019-04-14 DIAGNOSIS — IMO0002 UNCONTROLLED TYPE 2 DIABETES MELLITUS WITH COMPLICATION, WITH LONG-TERM CURRENT USE OF INSULIN: ICD-10-CM

## 2019-04-15 ENCOUNTER — TELEPHONE (OUTPATIENT)
Dept: CARDIOLOGY | Facility: CLINIC | Age: 84
End: 2019-04-15

## 2019-04-15 ENCOUNTER — ANTICOAGULATION VISIT (OUTPATIENT)
Dept: PHARMACY | Facility: HOSPITAL | Age: 84
End: 2019-04-15

## 2019-04-15 DIAGNOSIS — Z95.2 HX OF AORTIC VALVE REPLACEMENT, MECHANICAL: ICD-10-CM

## 2019-04-15 LAB
INR PPP: 3.4 (ref 0.91–1.09)
PROTHROMBIN TIME: 40.3 SECONDS (ref 10–13.8)

## 2019-04-15 PROCEDURE — 36416 COLLJ CAPILLARY BLOOD SPEC: CPT

## 2019-04-15 PROCEDURE — 85610 PROTHROMBIN TIME: CPT

## 2019-04-15 RX ORDER — INSULIN DEGLUDEC INJECTION 100 U/ML
INJECTION, SOLUTION SUBCUTANEOUS
Qty: 3 PEN | Refills: 4 | Status: SHIPPED | OUTPATIENT
Start: 2019-04-15 | End: 2019-11-15 | Stop reason: SDUPTHER

## 2019-04-15 RX ORDER — DIGOXIN 125 UG/1
TABLET ORAL
Qty: 90 TABLET | Refills: 0 | Status: SHIPPED | OUTPATIENT
Start: 2019-04-15 | End: 2019-07-11 | Stop reason: SDUPTHER

## 2019-04-15 NOTE — TELEPHONE ENCOUNTER
I spoke with Dr. Marin who verbalized understanding of the msg and said he will contact the pt about having the INR today./prt

## 2019-04-15 NOTE — TELEPHONE ENCOUNTER
Dr. Marin left msg saying pt needs another tooth extracted tomorrow and asking about the warfarin.  He had this done last month and you said NOT to hold it.  Dr. Marin just wants to know if that is still the case for this extraction?      Thanks,  Sravanthi    [phone]

## 2019-04-15 NOTE — PROGRESS NOTES
Anticoagulation Clinic Progress Note    Anticoagulation Summary  As of 4/15/2019    INR goal:   2.5-3.5   TTR:   73.7 % (6.6 mo)   INR used for dosing:   3.4 (4/15/2019)   Warfarin maintenance plan:   5 mg every Mon, Wed, Fri; 7.5 mg all other days   Weekly warfarin total:   45 mg   No change documented:   Angie Sykes   Plan last modified:   Marisol Merida [facility] (9/19/2018)   Next INR check:   5/13/2019   Priority:   Maintenance   Target end date:   Indefinite    Indications    Atrial fibrillation (CMS/HCC) [I48.91]  Hx of aortic valve replacement  mechanical [Z95.2] [Z95.2]             Anticoagulation Episode Summary     INR check location:       Preferred lab:       Send INR reminders to:    [facility]    Comments:         Anticoagulation Care Providers     Provider Role Specialty Phone number    Griffin Fried MD Referring Cardiology [phone]          Clinic Interview:  Patient Findings     Negatives:   Signs/symptoms of thrombosis, Signs/symptoms of bleeding,   Laboratory test error suspected, Change in health, Change in alcohol use,   Change in activity, Upcoming invasive procedure, Emergency department   visit, Upcoming dental procedure, Missed doses, Extra doses, Change in   medications, Change in diet/appetite, Hospital admission, Bruising, Other   complaints      Clinical Outcomes     Negatives:   Major bleeding event, Thromboembolic event,   Anticoagulation-related hospital admission, Anticoagulation-related ED   visit, Anticoagulation-related fatality        INR History:  Anticoagulation Monitoring 3/5/2019 4/2/2019 4/15/2019   INR 3.2 3.3 3.4   INR Date 3/5/2019 4/2/2019 4/15/2019   INR Goal 2.5-3.5 2.5-3.5 2.5-3.5   Trend Same Same Same   Last Week Total 45 mg 45 mg 45 mg   Next Week Total 45 mg 45 mg 45 mg   Sun 7.5 mg 7.5 mg 7.5 mg   Mon 5 mg 5 mg 5 mg   Tue 7.5 mg 7.5 mg 7.5 mg   Wed 5 mg 5 mg 5 mg   Thu 7.5 mg 7.5 mg 7.5 mg   Fri 5 mg 5 mg 5 mg   Sat 7.5 mg 7.5 mg 7.5 mg    Visit Report - - -   Some recent data might be hidden       Plan:  1. INR is therapeutic today- see above in Anticoagulation Summary.   Will instruct Francisco Sequeira to continue their warfarin regimen- see above in Anticoagulation Summary.  2. Follow up in 4 weeks.  3. Patient declines warfarin refills.  4. Verbal and written information provided. Patient expresses understanding and has no further questions at this time.    Angie Sykes

## 2019-04-17 RX ORDER — ATORVASTATIN CALCIUM 20 MG/1
TABLET, FILM COATED ORAL
Qty: 90 TABLET | Refills: 0 | Status: SHIPPED | OUTPATIENT
Start: 2019-04-17 | End: 2019-07-19 | Stop reason: SDUPTHER

## 2019-04-22 RX ORDER — METOPROLOL SUCCINATE 25 MG/1
TABLET, EXTENDED RELEASE ORAL
Qty: 90 TABLET | Refills: 3 | Status: SHIPPED | OUTPATIENT
Start: 2019-04-22 | End: 2020-04-15

## 2019-05-01 ENCOUNTER — OFFICE VISIT (OUTPATIENT)
Dept: FAMILY MEDICINE CLINIC | Facility: CLINIC | Age: 84
End: 2019-05-01

## 2019-05-01 VITALS
HEIGHT: 72 IN | TEMPERATURE: 97.8 F | DIASTOLIC BLOOD PRESSURE: 64 MMHG | WEIGHT: 179.2 LBS | OXYGEN SATURATION: 97 % | HEART RATE: 64 BPM | SYSTOLIC BLOOD PRESSURE: 130 MMHG | BODY MASS INDEX: 24.27 KG/M2

## 2019-05-01 DIAGNOSIS — I10 ESSENTIAL HYPERTENSION: ICD-10-CM

## 2019-05-01 DIAGNOSIS — E78.5 HYPERLIPIDEMIA, UNSPECIFIED HYPERLIPIDEMIA TYPE: ICD-10-CM

## 2019-05-01 DIAGNOSIS — E11.649 UNCONTROLLED TYPE 2 DIABETES MELLITUS WITH HYPOGLYCEMIA WITHOUT COMA (HCC): Primary | ICD-10-CM

## 2019-05-01 DIAGNOSIS — K21.9 GASTROESOPHAGEAL REFLUX DISEASE WITHOUT ESOPHAGITIS: ICD-10-CM

## 2019-05-01 LAB — HBA1C MFR BLD: 8.3 %

## 2019-05-01 PROCEDURE — 99214 OFFICE O/P EST MOD 30 MIN: CPT | Performed by: NURSE PRACTITIONER

## 2019-05-01 PROCEDURE — 83036 HEMOGLOBIN GLYCOSYLATED A1C: CPT | Performed by: NURSE PRACTITIONER

## 2019-05-01 NOTE — PROGRESS NOTES
"Subjective   Francisco Sequeira is a 83 y.o. male.     History of Present Illness   Francisco Sequeira 83 y.o. male who presents today for routine follow up check and medication refills.  he has a history of   Patient Active Problem List   Diagnosis   • Atrial fibrillation (CMS/HCC)   • Hypertension   • Cardiomyopathy (CMS/HCC)   • Atopic rhinitis   • Uncontrolled type 2 diabetes mellitus (CMS/HCC)   • Gastroesophageal reflux disease   • Hyperlipidemia   • Renal insufficiency   • Low testosterone   • Special screening for malignant neoplasm of prostate   • Hx of mitral valve replacement with mechanical valve [Z95.2]   • Hx of aortic valve replacement, mechanical [Z95.2]   • Screening for disorder of blood and blood-forming organs   .  Since the last visit, he has overall felt well.  He has Hypertenision and is well controlled on medication, has been having elevated blood pressure readings and here to evaluate this, GERD and is well controlled on PPI medication and Hyperlipidemia and is well controlled on medication.  he has been compliant with current medications have reviewed them.  The patient denies medication side effects.    Results for orders placed or performed in visit on 04/15/19   POC Protime / INR   Result Value Ref Range    Protime 40.3 (H) 10.0 - 13.8 seconds    INR 3.4 (H) 0.91 - 1.09         The following portions of the patient's history were reviewed and updated as appropriate: allergies, current medications, past social history and problem list.    Review of Systems   Constitutional: Negative for fever.   Respiratory: Negative for cough and shortness of breath.    Cardiovascular: Negative for chest pain.   Gastrointestinal: Negative for abdominal pain.   Neurological: Negative for dizziness.       Objective   /64 (BP Location: Right arm, Patient Position: Sitting)   Pulse 64   Temp 97.8 °F (36.6 °C)   Ht 182.9 cm (72.01\")   Wt 81.3 kg (179 lb 3.2 oz)   SpO2 97%   BMI 24.30 kg/m²   Physical " Exam   Constitutional: He is oriented to person, place, and time. Vital signs are normal. He appears well-developed and well-nourished. No distress.   HENT:   Head: Normocephalic.   Cardiovascular: Normal rate, regular rhythm and normal heart sounds.   Pulmonary/Chest: Effort normal and breath sounds normal.   Neurological: He is alert and oriented to person, place, and time. Gait normal.   Psychiatric: He has a normal mood and affect. His behavior is normal. Judgment and thought content normal.   Vitals reviewed.      Assessment/Plan      Diagnosis Plan   1. Uncontrolled type 2 diabetes mellitus with hypoglycemia without coma (CMS/HCC)  POC Glycosylated Hemoglobin (Hb A1C)    C-Peptide   2. Essential hypertension     3. Gastroesophageal reflux disease without esophagitis     4. Hyperlipidemia, unspecified hyperlipidemia type       Increase Tresiba to 50units a night   Discussed with him that the VGo is not working well and we need to change and go back to injections with meals and him and his wife didn't want to change at this time.  Again, stated that he would stop snacking.     rto in 3 mons     If no improvement in 3 mons then we will go to meal time insulin injections   Rubin Hinkle, LIZA  5/1/2019

## 2019-05-06 RX ORDER — WARFARIN SODIUM 5 MG/1
TABLET ORAL
Qty: 40 TABLET | Refills: 0 | Status: SHIPPED | OUTPATIENT
Start: 2019-05-06 | End: 2019-06-03 | Stop reason: SDUPTHER

## 2019-05-13 ENCOUNTER — ANTICOAGULATION VISIT (OUTPATIENT)
Dept: PHARMACY | Facility: HOSPITAL | Age: 84
End: 2019-05-13

## 2019-05-13 DIAGNOSIS — Z95.2 HX OF AORTIC VALVE REPLACEMENT, MECHANICAL: ICD-10-CM

## 2019-05-13 LAB
INR PPP: 3.3 (ref 0.91–1.09)
PROTHROMBIN TIME: 39.3 SECONDS (ref 10–13.8)

## 2019-05-13 PROCEDURE — 36416 COLLJ CAPILLARY BLOOD SPEC: CPT

## 2019-05-13 PROCEDURE — 85610 PROTHROMBIN TIME: CPT

## 2019-05-13 NOTE — PROGRESS NOTES
Anticoagulation Clinic Progress Note    Anticoagulation Summary  As of 5/13/2019    INR goal:   2.5-3.5   TTR:   76.9 % (7.5 mo)   INR used for dosing:   3.3 (5/13/2019)   Warfarin maintenance plan:   5 mg every Mon, Wed, Fri; 7.5 mg all other days   Weekly warfarin total:   45 mg   No change documented:   Angie Sykes   Plan last modified:   Marisol Merida [facility] (9/19/2018)   Next INR check:   6/10/2019   Priority:   Maintenance   Target end date:   Indefinite    Indications    Atrial fibrillation (CMS/HCC) [I48.91]  Hx of aortic valve replacement  mechanical [Z95.2] [Z95.2]             Anticoagulation Episode Summary     INR check location:       Preferred lab:       Send INR reminders to:    [facility]    Comments:         Anticoagulation Care Providers     Provider Role Specialty Phone number    Griffin Fried MD Referring Cardiology [phone]          Clinic Interview:  Patient Findings     Negatives:   Signs/symptoms of thrombosis, Signs/symptoms of bleeding,   Laboratory test error suspected, Change in health, Change in alcohol use,   Change in activity, Upcoming invasive procedure, Emergency department   visit, Upcoming dental procedure, Missed doses, Extra doses, Change in   medications, Change in diet/appetite, Hospital admission, Bruising, Other   complaints      Clinical Outcomes     Negatives:   Major bleeding event, Thromboembolic event,   Anticoagulation-related hospital admission, Anticoagulation-related ED   visit, Anticoagulation-related fatality        INR History:  Anticoagulation Monitoring 4/2/2019 4/15/2019 5/13/2019   INR 3.3 3.4 3.3   INR Date 4/2/2019 4/15/2019 5/13/2019   INR Goal 2.5-3.5 2.5-3.5 2.5-3.5   Trend Same Same Same   Last Week Total 45 mg 45 mg 45 mg   Next Week Total 45 mg 45 mg 45 mg   Sun 7.5 mg 7.5 mg 7.5 mg   Mon 5 mg 5 mg 5 mg   Tue 7.5 mg 7.5 mg 7.5 mg   Wed 5 mg 5 mg 5 mg   Thu 7.5 mg 7.5 mg 7.5 mg   Fri 5 mg 5 mg 5 mg   Sat 7.5 mg 7.5 mg 7.5  mg   Visit Report - - -   Some recent data might be hidden       Plan:  1. INR is therapeutic today- see above in Anticoagulation Summary.   Will instruct Francisco Sequeira to continue their warfarin regimen- see above in Anticoagulation Summary.  2. Follow up in 4 weeks.  3. Patient declines warfarin refills.  4. Verbal and written information provided. Patient expresses understanding and has no further questions at this time.    Angie Sykes

## 2019-05-22 DIAGNOSIS — IMO0002 UNCONTROLLED TYPE 2 DIABETES MELLITUS WITH COMPLICATION, WITH LONG-TERM CURRENT USE OF INSULIN: ICD-10-CM

## 2019-05-23 DIAGNOSIS — IMO0002 UNCONTROLLED TYPE 2 DIABETES MELLITUS WITH COMPLICATION, WITH LONG-TERM CURRENT USE OF INSULIN: ICD-10-CM

## 2019-05-23 RX ORDER — SUB-Q INSULIN DEVICE, 40 UNIT
EACH MISCELLANEOUS
Qty: 60 EACH | Refills: 4 | Status: SHIPPED | OUTPATIENT
Start: 2019-05-23 | End: 2020-03-23

## 2019-06-03 RX ORDER — WARFARIN SODIUM 5 MG/1
TABLET ORAL
Qty: 40 TABLET | Refills: 0 | Status: SHIPPED | OUTPATIENT
Start: 2019-06-03 | End: 2019-06-29 | Stop reason: SDUPTHER

## 2019-06-10 ENCOUNTER — ANTICOAGULATION VISIT (OUTPATIENT)
Dept: PHARMACY | Facility: HOSPITAL | Age: 84
End: 2019-06-10

## 2019-06-10 DIAGNOSIS — Z95.2 HX OF AORTIC VALVE REPLACEMENT, MECHANICAL: ICD-10-CM

## 2019-06-10 LAB
INR PPP: 3.3 (ref 0.91–1.09)
PROTHROMBIN TIME: 39.5 SECONDS (ref 10–13.8)

## 2019-06-10 PROCEDURE — 36416 COLLJ CAPILLARY BLOOD SPEC: CPT

## 2019-06-10 PROCEDURE — 85610 PROTHROMBIN TIME: CPT

## 2019-06-10 NOTE — PROGRESS NOTES
Anticoagulation Clinic Progress Note    Anticoagulation Summary  As of 6/10/2019    INR goal:   2.5-3.5   TTR:   79.4 % (8.5 mo)   INR used for dosing:   3.3 (6/10/2019)   Warfarin maintenance plan:   5 mg every Mon, Wed, Fri; 7.5 mg all other days   Weekly warfarin total:   45 mg   No change documented:   Angie Sykes   Plan last modified:   Marisol Merida [facility] (9/19/2018)   Next INR check:   7/8/2019   Priority:   Maintenance   Target end date:   Indefinite    Indications    Atrial fibrillation (CMS/HCC) [I48.91]  Hx of aortic valve replacement  mechanical [Z95.2] [Z95.2]             Anticoagulation Episode Summary     INR check location:       Preferred lab:       Send INR reminders to:    [facility]    Comments:         Anticoagulation Care Providers     Provider Role Specialty Phone number    Griffin Fried MD Referring Cardiology [phone]          Clinic Interview:  Patient Findings     Negatives:   Signs/symptoms of thrombosis, Signs/symptoms of bleeding,   Laboratory test error suspected, Change in health, Change in alcohol use,   Change in activity, Upcoming invasive procedure, Emergency department   visit, Upcoming dental procedure, Missed doses, Extra doses, Change in   medications, Change in diet/appetite, Hospital admission, Bruising, Other   complaints      Clinical Outcomes     Negatives:   Major bleeding event, Thromboembolic event,   Anticoagulation-related hospital admission, Anticoagulation-related ED   visit, Anticoagulation-related fatality        INR History:  Anticoagulation Monitoring 4/15/2019 5/13/2019 6/10/2019   INR 3.4 3.3 3.3   INR Date 4/15/2019 5/13/2019 6/10/2019   INR Goal 2.5-3.5 2.5-3.5 2.5-3.5   Trend Same Same Same   Last Week Total 45 mg 45 mg 45 mg   Next Week Total 45 mg 45 mg 45 mg   Sun 7.5 mg 7.5 mg 7.5 mg   Mon 5 mg 5 mg 5 mg   Tue 7.5 mg 7.5 mg 7.5 mg   Wed 5 mg 5 mg 5 mg   Thu 7.5 mg 7.5 mg 7.5 mg   Fri 5 mg 5 mg 5 mg   Sat 7.5 mg 7.5 mg 7.5  mg   Visit Report - - -   Some recent data might be hidden       Plan:  1. INR is therapeutic today- see above in Anticoagulation Summary.   Will instruct Francisco Sequeira to continue their warfarin regimen- see above in Anticoagulation Summary.  2. Follow up in 4 weeks.  3. Patient declines warfarin refills.  4. Verbal and written information provided. Patient expresses understanding and has no further questions at this time.    Angie Sykes

## 2019-07-01 RX ORDER — WARFARIN SODIUM 5 MG/1
TABLET ORAL
Qty: 120 TABLET | Refills: 0 | Status: SHIPPED | OUTPATIENT
Start: 2019-07-01 | End: 2019-10-11 | Stop reason: SDUPTHER

## 2019-07-08 ENCOUNTER — ANTICOAGULATION VISIT (OUTPATIENT)
Dept: PHARMACY | Facility: HOSPITAL | Age: 84
End: 2019-07-08

## 2019-07-08 DIAGNOSIS — Z95.2 HX OF AORTIC VALVE REPLACEMENT, MECHANICAL: ICD-10-CM

## 2019-07-08 LAB
INR PPP: 3.3 (ref 0.91–1.09)
PROTHROMBIN TIME: 39.5 SECONDS (ref 10–13.8)

## 2019-07-08 PROCEDURE — 85610 PROTHROMBIN TIME: CPT

## 2019-07-08 PROCEDURE — 36416 COLLJ CAPILLARY BLOOD SPEC: CPT

## 2019-07-08 NOTE — PROGRESS NOTES
Anticoagulation Clinic Progress Note    Anticoagulation Summary  As of 7/8/2019    INR goal:   2.5-3.5   TTR:   81.5 % (9.4 mo)   INR used for dosing:   3.3 (7/8/2019)   Warfarin maintenance plan:   5 mg every Mon, Wed, Fri; 7.5 mg all other days   Weekly warfarin total:   45 mg   No change documented:   Princess Robin   Plan last modified:   Marisol Merida RPH (9/19/2018)   Next INR check:   8/6/2019   Priority:   Maintenance   Target end date:   Indefinite    Indications    Atrial fibrillation (CMS/[facility]) [I48.91]  Hx of aortic valve replacement  mechanical [Z95.2] [Z95.2]             Anticoagulation Episode Summary     INR check location:       Preferred lab:       Send INR reminders to:    [facility]    Comments:         Anticoagulation Care Providers     Provider Role Specialty Phone number    Griffin Fried MD Referring Cardiology [phone]          Clinic Interview:  Patient Findings     Negatives:   Signs/symptoms of thrombosis, Signs/symptoms of bleeding,   Laboratory test error suspected, Change in health, Change in alcohol use,   Change in activity, Upcoming invasive procedure, Emergency department   visit, Upcoming dental procedure, Missed doses, Extra doses, Change in   medications, Change in diet/appetite, Hospital admission, Bruising, Other   complaints      Clinical Outcomes     Negatives:   Major bleeding event, Thromboembolic event,   Anticoagulation-related hospital admission, Anticoagulation-related ED   visit, Anticoagulation-related fatality        INR History:  Anticoagulation Monitoring 5/13/2019 6/10/2019 7/8/2019   INR 3.3 3.3 3.3   INR Date 5/13/2019 6/10/2019 7/8/2019   INR Goal 2.5-3.5 2.5-3.5 2.5-3.5   Trend Same Same Same   Last Week Total 45 mg 45 mg 45 mg   Next Week Total 45 mg 45 mg 45 mg   Sun 7.5 mg 7.5 mg 7.5 mg   Mon 5 mg 5 mg 5 mg   Tue 7.5 mg 7.5 mg 7.5 mg   Wed 5 mg 5 mg 5 mg   Thu 7.5 mg 7.5 mg 7.5 mg   Fri 5 mg 5 mg 5 mg   Sat 7.5 mg 7.5 mg 7.5 mg    Visit Report - - -   Some recent data might be hidden       Plan:  1. INR is therapeutic today- see above in Anticoagulation Summary.   Will instruct Francisco Sequeira to continue their warfarin regimen- see above in Anticoagulation Summary.  2. Follow up in 4 weeks.  3. Patient declines warfarin refills.  4. Verbal and written information provided. Patient expresses understanding and has no further questions at this time.    Princess Robin

## 2019-07-11 RX ORDER — DIGOXIN 125 UG/1
TABLET ORAL
Qty: 90 TABLET | Refills: 0 | Status: SHIPPED | OUTPATIENT
Start: 2019-07-11 | End: 2019-10-06 | Stop reason: SDUPTHER

## 2019-07-18 DIAGNOSIS — IMO0002 UNCONTROLLED TYPE 2 DIABETES MELLITUS WITH COMPLICATION, WITH LONG-TERM CURRENT USE OF INSULIN: ICD-10-CM

## 2019-07-22 RX ORDER — ATORVASTATIN CALCIUM 20 MG/1
TABLET, FILM COATED ORAL
Qty: 90 TABLET | Refills: 0 | Status: SHIPPED | OUTPATIENT
Start: 2019-07-22 | End: 2019-10-18 | Stop reason: SDUPTHER

## 2019-07-23 ENCOUNTER — OFFICE VISIT (OUTPATIENT)
Dept: CARDIOLOGY | Facility: CLINIC | Age: 84
End: 2019-07-23

## 2019-07-23 VITALS
WEIGHT: 185 LBS | SYSTOLIC BLOOD PRESSURE: 140 MMHG | DIASTOLIC BLOOD PRESSURE: 80 MMHG | HEIGHT: 72 IN | HEART RATE: 69 BPM | BODY MASS INDEX: 25.06 KG/M2

## 2019-07-23 DIAGNOSIS — Z95.2 HX OF AORTIC VALVE REPLACEMENT, MECHANICAL: ICD-10-CM

## 2019-07-23 DIAGNOSIS — I48.20 CHRONIC ATRIAL FIBRILLATION (HCC): Primary | ICD-10-CM

## 2019-07-23 DIAGNOSIS — I10 ESSENTIAL HYPERTENSION: ICD-10-CM

## 2019-07-23 PROCEDURE — 93000 ELECTROCARDIOGRAM COMPLETE: CPT | Performed by: INTERNAL MEDICINE

## 2019-07-23 PROCEDURE — 99214 OFFICE O/P EST MOD 30 MIN: CPT | Performed by: INTERNAL MEDICINE

## 2019-07-23 NOTE — PROGRESS NOTES
Subjective:     Encounter Date:05/17/2017      Patient ID: Francisco Sequeira is a 83 y.o. male.    Chief Complaint:  Atrial Fibrillation   Presents for follow-up visit. Symptoms are negative for hypertension, palpitations and tachycardia. Past medical history includes atrial fibrillation.   Hypertension   This is a chronic problem. The current episode started more than 1 year ago. The problem is controlled. Pertinent negatives include no palpitations.       83-year-old gentleman who presents today for reevaluation.  Clinically he continues to do well says he feels great.  He has no limitations of activities no shortness of breath chest pain lightheadedness or swelling.    Review of Systems   Cardiovascular: Negative for palpitations.   Skin: Positive for itching.   All other systems reviewed and are negative.        ECG 12 Lead  Date/Time: 7/23/2019 10:03 AM  Performed by: Griffin Fried MD  Authorized by: Griffin Fried MD   Comparison: compared with previous ECG from 2/23/2018  Similar to previous ECG  Rhythm: atrial fibrillation  Other findings: non-specific ST-T wave changes    Clinical impression: abnormal EKG               Objective:     Physical Exam   Constitutional: He is oriented to person, place, and time. He appears well-developed.   HENT:   Head: Normocephalic.   Eyes: Conjunctivae are normal.   Neck: Normal range of motion.   Cardiovascular: Normal rate and normal heart sounds. An irregularly irregular rhythm present.   Artificial click   Pulmonary/Chest: Breath sounds normal.   Abdominal: Soft. Bowel sounds are normal.   Musculoskeletal: Normal range of motion. He exhibits no edema.   Neurological: He is alert and oriented to person, place, and time.   Skin: Skin is warm and dry.   Psychiatric: He has a normal mood and affect. His behavior is normal.   Vitals reviewed.      Lab Review:       Assessment:          Diagnosis Plan   1. Chronic atrial fibrillation (CMS/HCC)     2. Essential  hypertension     3. Hx of aortic valve replacement, mechanical [Z95.2]            Plan:       1.  Chronic atrial fibrillation.  Heart rate was still controlled he is anticoagulated with Coumadin.  2.  Patient with artificial aortic metal heart valve.  Patient's valve sounds really good.  His last echo was on 6/1/2017.  Patient remains anticoagulated.  3.  Hypertension blood pressures okay.  Would continue to follow at home.    4. Continue same follow-up one year    Atrial Fibrillation and Atrial Flutter  Assessment  • The patient has permanent atrial fibrillation  • This is valvular in etiology  • The patient's CHADS2-VASc score is 4  • A [id number] score of 2 or more is considered a high risk for a thromboembolic event  • Warfarin prescribed    Plan  • Continue in atrial fibrillation with rate control  • Continue warfarin for antithrombotic therapy, bleeding issues discussed

## 2019-08-05 ENCOUNTER — APPOINTMENT (OUTPATIENT)
Dept: PHARMACY | Facility: HOSPITAL | Age: 84
End: 2019-08-05

## 2019-08-06 ENCOUNTER — ANTICOAGULATION VISIT (OUTPATIENT)
Dept: PHARMACY | Facility: HOSPITAL | Age: 84
End: 2019-08-06

## 2019-08-06 DIAGNOSIS — Z95.2 HX OF AORTIC VALVE REPLACEMENT, MECHANICAL: ICD-10-CM

## 2019-08-06 LAB
INR PPP: 3.7 (ref 0.91–1.09)
PROTHROMBIN TIME: 44.4 SECONDS (ref 10–13.8)

## 2019-08-06 PROCEDURE — 36416 COLLJ CAPILLARY BLOOD SPEC: CPT

## 2019-08-06 PROCEDURE — 85610 PROTHROMBIN TIME: CPT

## 2019-08-06 PROCEDURE — G0463 HOSPITAL OUTPT CLINIC VISIT: HCPCS

## 2019-08-06 NOTE — PROGRESS NOTES
Anticoagulation Clinic Progress Note    Anticoagulation Summary  As of 8/6/2019    INR goal:   2.5-3.5   TTR:   78.5 % (10.4 mo)   INR used for dosing:   3.7! (8/6/2019)   Warfarin maintenance plan:   5 mg every Mon, Wed, Fri; 7.5 mg all other days   Weekly warfarin total:   45 mg   Plan last modified:   Marisol Merida RP (9/19/2018)   Next INR check:   9/3/2019   Priority:   Maintenance   Target end date:   Indefinite    Indications    Atrial fibrillation (CMS/HCC) [I48.91]  Hx of aortic valve replacement  mechanical [Z95.2] [Z95.2]             Anticoagulation Episode Summary     INR check location:       Preferred lab:       Send INR reminders to:    [facility]    Comments:         Anticoagulation Care Providers     Provider Role Specialty Phone number    Griffin Fried MD Referring Cardiology [phone]          Clinic Interview:      INR History:  Anticoagulation Monitoring 6/10/2019 7/8/2019 8/6/2019   INR 3.3 3.3 3.7   INR Date 6/10/2019 7/8/2019 8/6/2019   INR Goal 2.5-3.5 2.5-3.5 2.5-3.5   Trend Same Same Same   Last Week Total 45 mg 45 mg 45 mg   Next Week Total 45 mg 45 mg 42.5 mg   Sun 7.5 mg 7.5 mg 7.5 mg   Mon 5 mg 5 mg 5 mg   Tue 7.5 mg 7.5 mg 5 mg (8/6); Otherwise 7.5 mg   Wed 5 mg 5 mg 5 mg   Thu 7.5 mg 7.5 mg 7.5 mg   Fri 5 mg 5 mg 5 mg   Sat 7.5 mg 7.5 mg 7.5 mg   Visit Report - - -   Some recent data might be hidden       Plan:  1. INR is Supratherapeutic today - see above in Anticoagulation Summary.  Will instruct Francisco Sequeira to Continue their warfarin regimen- see above in Anticoagulation Summary.  He will reduce only today's warfarin dose to 5mg.  2. Follow up in 1 month  3. Patient declines warfarin refills.  4. Verbal and written information provided. Patient expresses understanding and has no further questions at this time.    Audra Vazquez [facility]

## 2019-08-15 ENCOUNTER — OFFICE VISIT (OUTPATIENT)
Dept: FAMILY MEDICINE CLINIC | Facility: CLINIC | Age: 84
End: 2019-08-15

## 2019-08-15 VITALS
BODY MASS INDEX: 24.26 KG/M2 | DIASTOLIC BLOOD PRESSURE: 70 MMHG | SYSTOLIC BLOOD PRESSURE: 140 MMHG | TEMPERATURE: 98.4 F | HEIGHT: 72 IN | HEART RATE: 67 BPM | OXYGEN SATURATION: 99 % | WEIGHT: 179.1 LBS

## 2019-08-15 DIAGNOSIS — E11.649 UNCONTROLLED TYPE 2 DIABETES MELLITUS WITH HYPOGLYCEMIA WITHOUT COMA (HCC): Primary | ICD-10-CM

## 2019-08-15 DIAGNOSIS — I10 ESSENTIAL HYPERTENSION: ICD-10-CM

## 2019-08-15 DIAGNOSIS — B49 FUNGUS INFECTION: ICD-10-CM

## 2019-08-15 DIAGNOSIS — E78.5 HYPERLIPIDEMIA, UNSPECIFIED HYPERLIPIDEMIA TYPE: ICD-10-CM

## 2019-08-15 LAB — HBA1C MFR BLD: 7.5 %

## 2019-08-15 PROCEDURE — 99214 OFFICE O/P EST MOD 30 MIN: CPT | Performed by: NURSE PRACTITIONER

## 2019-08-15 PROCEDURE — 83036 HEMOGLOBIN GLYCOSYLATED A1C: CPT | Performed by: NURSE PRACTITIONER

## 2019-08-15 NOTE — PROGRESS NOTES
"Subjective   Francisco Sequeira is a 83 y.o. male.     History of Present Illness   Francisco Sequeira 83 y.o. male who presents today for routine follow up check and medication refills.  he has a history of   Patient Active Problem List   Diagnosis   • Atrial fibrillation (CMS/HCC)   • Hypertension   • Atopic rhinitis   • Uncontrolled type 2 diabetes mellitus (CMS/HCC)   • Gastroesophageal reflux disease   • Hyperlipidemia   • Renal insufficiency   • Low testosterone   • Special screening for malignant neoplasm of prostate   • Hx of aortic valve replacement, mechanical [Z95.2]   • Screening for disorder of blood and blood-forming organs   .  Since the last visit, he has overall felt well.  He has Essential Hypertension and well controlled on current medication, DMII well controlled on medication and will continue regimen and Hyperlipidemia with goals met with current Rx.  he has been compliant with current medications have reviewed them.  The patient denies medication side effects.  They did increase long insulin to 55 units nightly bc sugars were running high in the morning and that has helped  Results for orders placed or performed in visit on 08/15/19   POC Glycosylated Hemoglobin (Hb A1C)   Result Value Ref Range    Hemoglobin A1C 7.5 %         The following portions of the patient's history were reviewed and updated as appropriate: allergies, current medications, past social history and problem list.    Review of Systems   Constitutional: Negative for fever.   Respiratory: Negative for cough and shortness of breath.    Cardiovascular: Negative for chest pain.   Gastrointestinal: Negative for abdominal pain.   Neurological: Negative for dizziness.       Objective   /70 (BP Location: Left arm, Patient Position: Sitting)   Pulse 67   Temp 98.4 °F (36.9 °C)   Ht 183 cm (72.03\")   Wt 81.2 kg (179 lb 1.6 oz)   SpO2 99%   BMI 24.27 kg/m²   Physical Exam   Constitutional: He is oriented to person, place, and " time. Vital signs are normal. He appears well-developed and well-nourished. No distress.   HENT:   Head: Normocephalic.   Cardiovascular: Normal rate, regular rhythm and normal heart sounds.   Pulmonary/Chest: Effort normal and breath sounds normal.   Neurological: He is alert and oriented to person, place, and time. Gait normal.   Psychiatric: He has a normal mood and affect. His behavior is normal. Judgment and thought content normal.   Vitals reviewed.      Assessment/Plan      Diagnosis Plan   1. Uncontrolled type 2 diabetes mellitus with hypoglycemia without coma (CMS/HCC)  POC Glycosylated Hemoglobin (Hb A1C)   2. Essential hypertension     3. Hyperlipidemia, unspecified hyperlipidemia type     4. Fungus infection  mupirocin (BACTROBAN) 2 % ointment     Cont same with meds  rto in 3 mons    Rubin Hinkle, APRN  8/15/2019

## 2019-09-03 ENCOUNTER — APPOINTMENT (OUTPATIENT)
Dept: PHARMACY | Facility: HOSPITAL | Age: 84
End: 2019-09-03

## 2019-09-04 ENCOUNTER — ANTICOAGULATION VISIT (OUTPATIENT)
Dept: PHARMACY | Facility: HOSPITAL | Age: 84
End: 2019-09-04

## 2019-09-04 DIAGNOSIS — Z95.2 HX OF AORTIC VALVE REPLACEMENT, MECHANICAL: ICD-10-CM

## 2019-09-04 LAB
INR PPP: 2.7 (ref 0.91–1.09)
PROTHROMBIN TIME: 32 SECONDS (ref 10–13.8)

## 2019-09-04 PROCEDURE — 36416 COLLJ CAPILLARY BLOOD SPEC: CPT

## 2019-09-04 PROCEDURE — 85610 PROTHROMBIN TIME: CPT

## 2019-09-04 NOTE — PROGRESS NOTES
Anticoagulation Clinic Progress Note    Anticoagulation Summary  As of 2019    INR goal:   2.5-3.5   TTR:   78.7 % (11.3 mo)   INR used for dosin.7 (2019)   Warfarin maintenance plan:   5 mg every Mon, Wed, Fri; 7.5 mg all other days   Weekly warfarin total:   45 mg   No change documented:   Angie Sykes   Plan last modified:   Marisol Merida [facility] (2018)   Next INR check:   10/2/2019   Priority:   Maintenance   Target end date:   Indefinite    Indications    Atrial fibrillation (CMS/HCC) [I48.91]  Hx of aortic valve replacement  mechanical [Z95.2] [Z95.2]             Anticoagulation Episode Summary     INR check location:       Preferred lab:       Send INR reminders to:    [facility]    Comments:         Anticoagulation Care Providers     Provider Role Specialty Phone number    Griffin Fried MD Referring Cardiology [phone]          Clinic Interview:  Patient Findings     Negatives:   Signs/symptoms of thrombosis, Signs/symptoms of bleeding,   Laboratory test error suspected, Change in health, Change in alcohol use,   Change in activity, Upcoming invasive procedure, Emergency department   visit, Upcoming dental procedure, Missed doses, Extra doses, Change in   medications, Change in diet/appetite, Hospital admission, Bruising, Other   complaints      Clinical Outcomes     Negatives:   Major bleeding event, Thromboembolic event,   Anticoagulation-related hospital admission, Anticoagulation-related ED   visit, Anticoagulation-related fatality        INR History:  Anticoagulation Monitoring 2019   INR 3.3 3.7 2.7   INR Date 2019   INR Goal 2.5-3.5 2.5-3.5 2.5-3.5   Trend Same Same Same   Last Week Total 45 mg 45 mg 45 mg   Next Week Total 45 mg 42.5 mg 45 mg   Sun 7.5 mg 7.5 mg 7.5 mg   Mon 5 mg 5 mg 5 mg   Tue 7.5 mg 5 mg (); Otherwise 7.5 mg 7.5 mg   Wed 5 mg 5 mg 5 mg   Thu 7.5 mg 7.5 mg 7.5 mg   Fri 5 mg 5 mg 5 mg   Sat  7.5 mg 7.5 mg 7.5 mg   Visit Report - - -   Some recent data might be hidden       Plan:  1. INR is therapeutic today- see above in Anticoagulation Summary.   Will instruct Francisco Sequeira to continue their warfarin regimen- see above in Anticoagulation Summary.  2. Follow up in 4 weeks.  3. Patient declines warfarin refills.  4. Verbal and written information provided. Patient expresses understanding and has no further questions at this time.    Angie Sykes

## 2019-09-27 RX ORDER — PEN NEEDLE, DIABETIC 30 GX3/16"
NEEDLE, DISPOSABLE MISCELLANEOUS
Qty: 200 EACH | Refills: 5 | Status: SHIPPED | OUTPATIENT
Start: 2019-09-27 | End: 2021-09-23

## 2019-10-07 ENCOUNTER — ANTICOAGULATION VISIT (OUTPATIENT)
Dept: PHARMACY | Facility: HOSPITAL | Age: 84
End: 2019-10-07

## 2019-10-07 DIAGNOSIS — Z95.2 HX OF AORTIC VALVE REPLACEMENT, MECHANICAL: ICD-10-CM

## 2019-10-07 LAB
INR PPP: 3.2 (ref 0.91–1.09)
PROTHROMBIN TIME: 38.8 SECONDS (ref 10–13.8)

## 2019-10-07 PROCEDURE — 85610 PROTHROMBIN TIME: CPT

## 2019-10-07 PROCEDURE — 36416 COLLJ CAPILLARY BLOOD SPEC: CPT

## 2019-10-07 RX ORDER — DIGOXIN 125 MCG
125 TABLET ORAL
Qty: 90 TABLET | Refills: 0 | Status: SHIPPED | OUTPATIENT
Start: 2019-10-07 | End: 2020-01-02

## 2019-10-07 NOTE — PROGRESS NOTES
Anticoagulation Clinic Progress Note    Anticoagulation Summary  As of 10/7/2019    INR goal:   2.5-3.5   TTR:   80.6 % (1 y)   INR used for dosing:   3.2 (10/7/2019)   Warfarin maintenance plan:   5 mg every Mon, Wed, Fri; 7.5 mg all other days   Weekly warfarin total:   45 mg   No change documented:   Princess Robin   Plan last modified:   Marisol Merida RPH (9/19/2018)   Next INR check:   11/4/2019   Priority:   Maintenance   Target end date:   Indefinite    Indications    Atrial fibrillation (CMS/HCC) [I48.91]  Hx of aortic valve replacement  mechanical [Z95.2] [Z95.2]             Anticoagulation Episode Summary     INR check location:       Preferred lab:       Send INR reminders to:   [facility]    Comments:         Anticoagulation Care Providers     Provider Role Specialty Phone number    Griffin Fried MD Referring Cardiology [phone]          Clinic Interview:  Patient Findings     Negatives:   Signs/symptoms of thrombosis, Signs/symptoms of bleeding,   Laboratory test error suspected, Change in health, Change in alcohol use,   Change in activity, Upcoming invasive procedure, Emergency department   visit, Upcoming dental procedure, Missed doses, Extra doses, Change in   medications, Change in diet/appetite, Hospital admission, Bruising, Other   complaints      Clinical Outcomes     Negatives:   Major bleeding event, Thromboembolic event,   Anticoagulation-related hospital admission, Anticoagulation-related ED   visit, Anticoagulation-related fatality        INR History:  Anticoagulation Monitoring 8/6/2019 9/4/2019 10/7/2019   INR 3.7 2.7 3.2   INR Date 8/6/2019 9/4/2019 10/7/2019   INR Goal 2.5-3.5 2.5-3.5 2.5-3.5   Trend Same Same Same   Last Week Total 45 mg 45 mg 45 mg   Next Week Total 42.5 mg 45 mg 45 mg   Sun 7.5 mg 7.5 mg 7.5 mg   Mon 5 mg 5 mg 5 mg   Tue 5 mg (8/6); Otherwise 7.5 mg 7.5 mg 7.5 mg   Wed 5 mg 5 mg 5 mg   Thu 7.5 mg 7.5 mg 7.5 mg   Fri 5 mg 5 mg 5 mg   Sat  7.5 mg 7.5 mg 7.5 mg   Visit Report - - -   Some recent data might be hidden       Plan:  1. INR is therapeutic today- see above in Anticoagulation Summary.   Will instruct Francisco Sequeira to continue their warfarin regimen- see above in Anticoagulation Summary.  2. Follow up in 4 weeks.  3. Patient declines warfarin refills.  4. Verbal and written information provided. Patient expresses understanding and has no further questions at this time.    Princess Robin

## 2019-10-10 DIAGNOSIS — B49 FUNGUS INFECTION: ICD-10-CM

## 2019-10-11 ENCOUNTER — TELEPHONE (OUTPATIENT)
Dept: CARDIOLOGY | Facility: CLINIC | Age: 84
End: 2019-10-11

## 2019-10-11 RX ORDER — WARFARIN SODIUM 5 MG/1
TABLET ORAL
Qty: 120 TABLET | Refills: 0 | Status: SHIPPED | OUTPATIENT
Start: 2019-10-11 | End: 2020-01-21 | Stop reason: HOSPADM

## 2019-10-11 NOTE — TELEPHONE ENCOUNTER
Pt left msg saying his Warfarin was denied- didn't understand why and he needs it.      Before calling him back I checked his chart and didn't find anything received or sent regarding a recent denial on warfarin.  Pt was seen in the [facility] 10-07-19 with next appt 11-04-19.    I spoke with the pt and he didn't know who denied it, he said [facility] called him.  I called [facility] and spoke with the pharmacist who said it looked like a computer glitch in the system from 09-27-19.  We don't even show anything from that DOS.  I told him I would send it now./prt

## 2019-10-18 RX ORDER — ATORVASTATIN CALCIUM 20 MG/1
TABLET, FILM COATED ORAL
Qty: 90 TABLET | Refills: 0 | Status: SHIPPED | OUTPATIENT
Start: 2019-10-18 | End: 2020-01-13

## 2019-11-04 ENCOUNTER — ANTICOAGULATION VISIT (OUTPATIENT)
Dept: PHARMACY | Facility: HOSPITAL | Age: 84
End: 2019-11-04

## 2019-11-04 DIAGNOSIS — Z95.2 HX OF AORTIC VALVE REPLACEMENT, MECHANICAL: ICD-10-CM

## 2019-11-04 LAB
INR PPP: 4.2 (ref 0.91–1.09)
PROTHROMBIN TIME: 50.7 SECONDS (ref 10–13.8)

## 2019-11-04 PROCEDURE — G0463 HOSPITAL OUTPT CLINIC VISIT: HCPCS

## 2019-11-04 PROCEDURE — 36416 COLLJ CAPILLARY BLOOD SPEC: CPT

## 2019-11-04 PROCEDURE — 85610 PROTHROMBIN TIME: CPT

## 2019-11-04 NOTE — PROGRESS NOTES
Anticoagulation Clinic Progress Note    Anticoagulation Summary  As of 2019    INR goal:   2.5-3.5   TTR:   77.0 % (1.1 y)   INR used for dosin.2! 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] [Z95.2]             Anticoagulation Episode Summary     INR check location:       Preferred lab:       Send INR reminders to:    [facility] GUTIERRE[facility]    Comments:         Anticoagulation Care Providers     Provider Role Specialty Phone number    Griffin Fried MD Referring Cardiology [phone]          Clinic Interview:  Patient Findings     Positives:   Change in alcohol use    Negatives:   Signs/symptoms of thrombosis, Signs/symptoms of bleeding,   Laboratory test error suspected, Change in health, Change in activity,   Upcoming invasive procedure, Emergency department visit, Upcoming dental   procedure, Missed doses, Extra doses, Change in medications, Change in   diet/appetite, Hospital admission, Bruising, Other complaints    Comments:   Wine tasting at [employer] 2 days this past weekend.       Clinical Outcomes     Negatives:   Major bleeding event, Thromboembolic event,   Anticoagulation-related hospital admission, Anticoagulation-related ED   visit, Anticoagulation-related fatality    Comments:   Wine tasting at [employer] 2 days this past weekend.         INR History:  Anticoagulation Monitoring 2019 10/7/2019 2019   INR 2.7 3.2 4.2   INR Date 2019 10/7/2019 2019   INR Goal 2.5-3.5 2.5-3.5 2.5-3.5   Trend Same Same Same   Last Week Total 45 mg 45 mg 45 mg   Next Week Total 45 mg 45 mg 42.5 mg   Sun 7.5 mg 7.5 mg 7.5 mg   Mon 5 mg 5 mg 2.5 mg  (11/4); Otherwise 5 mg   Tue 7.5 mg 7.5 mg 7.5 mg   Wed 5 mg 5 mg 5 mg   Thu 7.5 mg 7.5 mg 7.5 mg   Fri 5 mg 5 mg 5 mg   Sat 7.5 mg 7.5 mg 7.5 mg   Visit Report - - -   Some recent data might be hidden       Plan:  1. INR is Supratherapeutic today- see above in Anticoagulation Summary.  Will instruct Francisco Sequeira to decrease today's dose to 2.5mg. Then resume his previous dosage regimen of 45mg/week.   2. Follow up in 3 weeks  3. Patient declines warfarin refills.  4. Verbal and written information provided. Patient expresses understanding and has no further questions at this time.    Thuy Vazquez, [facility]

## 2019-11-15 ENCOUNTER — OFFICE VISIT (OUTPATIENT)
Dept: FAMILY MEDICINE CLINIC | Facility: CLINIC | Age: 84
End: 2019-11-15

## 2019-11-15 VITALS
DIASTOLIC BLOOD PRESSURE: 70 MMHG | TEMPERATURE: 98 F | HEIGHT: 72 IN | WEIGHT: 179.8 LBS | SYSTOLIC BLOOD PRESSURE: 126 MMHG | OXYGEN SATURATION: 99 % | HEART RATE: 75 BPM | BODY MASS INDEX: 24.35 KG/M2

## 2019-11-15 DIAGNOSIS — I10 ESSENTIAL HYPERTENSION: Primary | ICD-10-CM

## 2019-11-15 DIAGNOSIS — Z12.5 SPECIAL SCREENING FOR MALIGNANT NEOPLASM OF PROSTATE: ICD-10-CM

## 2019-11-15 DIAGNOSIS — K21.9 GASTROESOPHAGEAL REFLUX DISEASE WITHOUT ESOPHAGITIS: ICD-10-CM

## 2019-11-15 DIAGNOSIS — E11.649 UNCONTROLLED TYPE 2 DIABETES MELLITUS WITH HYPOGLYCEMIA WITHOUT COMA (HCC): ICD-10-CM

## 2019-11-15 DIAGNOSIS — L08.9 SOFT TISSUE INFECTION: ICD-10-CM

## 2019-11-15 DIAGNOSIS — IMO0002 UNCONTROLLED TYPE 2 DIABETES MELLITUS WITH COMPLICATION, WITH LONG-TERM CURRENT USE OF INSULIN: ICD-10-CM

## 2019-11-15 DIAGNOSIS — L23.7 POISON IVY: ICD-10-CM

## 2019-11-15 DIAGNOSIS — E78.5 HYPERLIPIDEMIA, UNSPECIFIED HYPERLIPIDEMIA TYPE: ICD-10-CM

## 2019-11-15 DIAGNOSIS — Z13.0 SCREENING FOR IRON DEFICIENCY ANEMIA: ICD-10-CM

## 2019-11-15 PROCEDURE — 99214 OFFICE O/P EST MOD 30 MIN: CPT | Performed by: NURSE PRACTITIONER

## 2019-11-15 RX ORDER — CLOBETASOL PROPIONATE 0.46 MG/ML
SOLUTION TOPICAL 2 TIMES DAILY
Qty: 1 EACH | Refills: 0 | Status: SHIPPED | OUTPATIENT
Start: 2019-11-15 | End: 2020-02-14

## 2019-11-15 NOTE — PROGRESS NOTES
"Subjective   Francisco Sequeira is a 83 y.o. male.     History of Present Illness    Since the last visit, he has overall felt well.  He has Essential Hypertension and well controlled on current medication, GERD controlled on PPI Rx and lipids need updated labs, blood sugar readings from home are great with highest in the 180s.  He is using 58 units at night..  he has been compliant with current medications have reviewed them.  The patient denies medication side effects.  Will refill medications. /70 (BP Location: Right arm, Patient Position: Sitting)   Pulse 75   Temp 98 °F (36.7 °C)   Ht 183 cm (72.05\")   Wt 81.6 kg (179 lb 12.8 oz)   SpO2 99%   BMI 24.35 kg/m²     Results for orders placed or performed in visit on 11/04/19   POC Protime / INR   Result Value Ref Range    Protime 50.7 (H) 10.0 - 13.8 seconds    INR 4.2 (H) 0.91 - 1.09         The following portions of the patient's history were reviewed and updated as appropriate: allergies, current medications, past social history and problem list.    Review of Systems   Constitutional: Negative for fever.   Respiratory: Negative for cough and shortness of breath.    Cardiovascular: Negative for chest pain.   Gastrointestinal: Negative for abdominal pain.   Neurological: Negative for dizziness.       Objective   /70 (BP Location: Right arm, Patient Position: Sitting)   Pulse 75   Temp 98 °F (36.7 °C)   Ht 183 cm (72.05\")   Wt 81.6 kg (179 lb 12.8 oz)   SpO2 99%   BMI 24.35 kg/m²   Physical Exam   Constitutional: He is oriented to person, place, and time. Vital signs are normal. He appears well-developed and well-nourished. No distress.   HENT:   Head: Normocephalic.   Cardiovascular: Normal rate, regular rhythm and normal heart sounds.   Pulmonary/Chest: Effort normal and breath sounds normal.   Neurological: He is alert and oriented to person, place, and time. Gait normal.   Psychiatric: He has a normal mood and affect. His behavior is " normal. Judgment and thought content normal.   Vitals reviewed.      Assessment/Plan      Diagnosis Plan   1. Essential hypertension     2. Uncontrolled type 2 diabetes mellitus with hypoglycemia without coma (CMS/[facility])  Comprehensive Metabolic Panel    Hemoglobin A1c   3. Gastroesophageal reflux disease without esophagitis     4. Hyperlipidemia, unspecified hyperlipidemia type  Comprehensive Metabolic Panel    Lipid Panel With LDL / HDL Ratio   5. Screening for iron deficiency anemia  CBC & Differential   6. Special screening for malignant neoplasm of prostate  PSA Screen   7. Uncontrolled type 2 diabetes mellitus with complication, with long-term current use of insulin (CMS/[facility])  insulin degludec (TRESIBA FLEXTOUCH) 100 UNIT/ML solution pen-injector injection   8. Soft tissue infection  mupirocin (BACTROBAN) 2 % ointment   9. Poison ivy  clobetasol (TEMOVATE) 0.05 % external solution     Cont same with meds  Follow up after labs  Unable to get a1c in office today  Rubin Hinkle, APRN  11/15/2019

## 2019-11-16 LAB
ALBUMIN SERPL-MCNC: 4.4 G/DL (ref 3.5–5.2)
ALBUMIN/GLOB SERPL: 1.7 G/DL
ALP SERPL-CCNC: 103 U/L (ref 39–117)
ALT SERPL-CCNC: 24 U/L (ref 1–41)
AST SERPL-CCNC: 23 U/L (ref 1–40)
BASOPHILS # BLD AUTO: 0.04 10*3/MM3 (ref 0–0.2)
BASOPHILS NFR BLD AUTO: 0.8 % (ref 0–1.5)
BILIRUB SERPL-MCNC: 0.6 MG/DL (ref 0.2–1.2)
BUN SERPL-MCNC: 37 MG/DL (ref 8–23)
BUN/CREAT SERPL: 22.4 (ref 7–25)
CALCIUM SERPL-MCNC: 9 MG/DL (ref 8.6–10.5)
CHLORIDE SERPL-SCNC: 104 MMOL/L (ref 98–107)
CHOLEST SERPL-MCNC: 165 MG/DL (ref 0–200)
CO2 SERPL-SCNC: 25.7 MMOL/L (ref 22–29)
CREAT SERPL-MCNC: 1.65 MG/DL (ref 0.76–1.27)
EOSINOPHIL # BLD AUTO: 0.11 10*3/MM3 (ref 0–0.4)
EOSINOPHIL NFR BLD AUTO: 2.2 % (ref 0.3–6.2)
ERYTHROCYTE [DISTWIDTH] IN BLOOD BY AUTOMATED COUNT: 14.4 % (ref 12.3–15.4)
GLOBULIN SER CALC-MCNC: 2.6 GM/DL
GLUCOSE SERPL-MCNC: 50 MG/DL (ref 65–99)
HBA1C MFR BLD: 7.2 % (ref 4.8–5.6)
HCT VFR BLD AUTO: 47 % (ref 37.5–51)
HDLC SERPL-MCNC: 37 MG/DL (ref 40–60)
HGB BLD-MCNC: 15.3 G/DL (ref 13–17.7)
IMM GRANULOCYTES # BLD AUTO: 0.02 10*3/MM3 (ref 0–0.05)
IMM GRANULOCYTES NFR BLD AUTO: 0.4 % (ref 0–0.5)
LDLC SERPL CALC-MCNC: 87 MG/DL (ref 0–100)
LDLC/HDLC SERPL: 2.35 {RATIO}
LYMPHOCYTES # BLD AUTO: 1.41 10*3/MM3 (ref 0.7–3.1)
LYMPHOCYTES NFR BLD AUTO: 28 % (ref 19.6–45.3)
MCH RBC QN AUTO: 28.3 PG (ref 26.6–33)
MCHC RBC AUTO-ENTMCNC: 32.6 G/DL (ref 31.5–35.7)
MCV RBC AUTO: 86.9 FL (ref 79–97)
MONOCYTES # BLD AUTO: 0.44 10*3/MM3 (ref 0.1–0.9)
MONOCYTES NFR BLD AUTO: 8.7 % (ref 5–12)
NEUTROPHILS # BLD AUTO: 3.01 10*3/MM3 (ref 1.7–7)
NEUTROPHILS NFR BLD AUTO: 59.9 % (ref 42.7–76)
NRBC BLD AUTO-RTO: 0 /100 WBC (ref 0–0.2)
PLATELET # BLD AUTO: 115 10*3/MM3 (ref 140–450)
POTASSIUM SERPL-SCNC: 4.4 MMOL/L (ref 3.5–5.2)
PROT SERPL-MCNC: 7 G/DL (ref 6–8.5)
PSA SERPL-MCNC: 0.11 NG/ML (ref 0–4)
RBC # BLD AUTO: 5.41 10*6/MM3 (ref 4.14–5.8)
SODIUM SERPL-SCNC: 144 MMOL/L (ref 136–145)
TRIGL SERPL-MCNC: 206 MG/DL (ref 0–150)
VLDLC SERPL CALC-MCNC: 41.2 MG/DL
WBC # BLD AUTO: 5.03 10*3/MM3 (ref 3.4–10.8)

## 2019-11-25 ENCOUNTER — ANTICOAGULATION VISIT (OUTPATIENT)
Dept: PHARMACY | Facility: HOSPITAL | Age: 84
End: 2019-11-25

## 2019-11-25 DIAGNOSIS — Z95.2 HX OF AORTIC VALVE REPLACEMENT, MECHANICAL: ICD-10-CM

## 2019-11-25 LAB
INR PPP: 3.4 (ref 0.91–1.09)
PROTHROMBIN TIME: 41.1 SECONDS (ref 10–13.8)

## 2019-11-25 PROCEDURE — 36416 COLLJ CAPILLARY BLOOD SPEC: CPT

## 2019-11-25 PROCEDURE — 85610 PROTHROMBIN TIME: CPT

## 2019-11-25 RX ORDER — FUROSEMIDE 20 MG/1
TABLET ORAL
Qty: 90 TABLET | Refills: 1 | Status: SHIPPED | OUTPATIENT
Start: 2019-11-25 | End: 2020-05-21

## 2019-11-25 NOTE — PROGRESS NOTES
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] [Z95.2]             Anticoagulation Episode Summary     INR check location:       Preferred lab:       Send INR reminders to:    [facility]    Comments:         Anticoagulation Care Providers     Provider Role Specialty Phone number    Griffin Fried MD Referring Cardiology [phone]          Clinic Interview:  Patient Findings     Negatives:   Signs/symptoms of thrombosis, Signs/symptoms of bleeding,   Laboratory test error suspected, Change in health, Change in alcohol use,   Change in activity, Upcoming invasive procedure, Emergency department   visit, Upcoming dental procedure, Missed doses, Extra doses, Change in   medications, Change in diet/appetite, Hospital admission, Bruising, Other   complaints      Clinical Outcomes     Negatives:   Major bleeding event, Thromboembolic event,   Anticoagulation-related hospital admission, Anticoagulation-related ED   visit, Anticoagulation-related fatality        INR History:  Anticoagulation Monitoring 10/7/2019 11/4/2019 11/25/2019   INR 3.2 4.2 3.4   INR Date 10/7/2019 11/4/2019 11/25/2019   INR Goal 2.5-3.5 2.5-3.5 2.5-3.5   Trend Same Same Same   Last Week Total 45 mg 45 mg 45 mg   Next Week Total 45 mg 42.5 mg 45 mg   Sun 7.5 mg 7.5 mg 7.5 mg   Mon 5 mg 2.5 mg (11/4); Otherwise 5 mg 5 mg   Tue 7.5 mg 7.5 mg 7.5 mg   Wed 5 mg 5 mg 5 mg   Thu 7.5 mg 7.5 mg  7.5 mg   Fri 5 mg 5 mg 5 mg   Sat 7.5 mg 7.5 mg 7.5 mg   Visit Report - - -   Some recent data might be hidden       Plan:  1. INR is Therapeutic today- see above in Anticoagulation Summary.  Will instruct Francisco Sequeira to Continue their warfarin regimen- see above in Anticoagulation Summary.  2. Follow up in 5 weeks  3. Patient declines warfarin refills.  4. Verbal and written information provided. Patient expresses understanding and has no further questions at this time.    Braxton Meehan, Pharmacy Intern

## 2019-11-25 NOTE — PROGRESS NOTES
I have supervised and reviewed the notes, assessments, and/or procedures performed by Michael Meehan, PharmD Candidate. The documented assessment and plan were developed cooperatively.I concur with his documentation of this patient.    Audra Vazquez [facility]

## 2019-12-03 ENCOUNTER — TELEPHONE (OUTPATIENT)
Dept: CARDIOLOGY | Facility: CLINIC | Age: 84
End: 2019-12-03

## 2019-12-03 NOTE — TELEPHONE ENCOUNTER
Dr. Blount called and said the pt needs to have 3 teeth extracted this month & will do them all at the same time.  He wants to know how long the pt could hold his warfarin prior?  He said the last time he extracted 2 teeth and the pt didn't need to come off of it, but this time it will be a little more involved and would like him to hold it if he can.    The pt just had an INR check on 11/25/19 and it was 3.4  Next INR is  12/30/19.  He said he may need him to have it checked the day before or day of the procedure.    Please advise.    Thanks,  Sravanthi

## 2019-12-30 ENCOUNTER — ANTICOAGULATION VISIT (OUTPATIENT)
Dept: PHARMACY | Facility: HOSPITAL | Age: 84
End: 2019-12-30

## 2019-12-30 DIAGNOSIS — Z95.2 HX OF AORTIC VALVE REPLACEMENT, MECHANICAL: ICD-10-CM

## 2019-12-30 LAB
INR PPP: 3.1 (ref 0.91–1.09)
PROTHROMBIN TIME: 36.8 SECONDS (ref 10–13.8)

## 2019-12-30 PROCEDURE — 36416 COLLJ CAPILLARY BLOOD SPEC: CPT

## 2019-12-30 PROCEDURE — 85610 PROTHROMBIN TIME: CPT

## 2019-12-30 NOTE — PROGRESS NOTES
I have supervised and reviewed the notes, assessments, and/or procedures performed by our PharmD Candidate. The documented assessment and plan were developed cooperatively. I concur with the documentation of this patient encounter.    Audra Vazquez RP

## 2019-12-30 NOTE — PROGRESS NOTES
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] [Z95.2]             Anticoagulation Episode Summary     INR check location:       Preferred lab:       Send INR reminders to:   [facility]    Comments:         Anticoagulation Care Providers     Provider Role Specialty Phone number    Griffin Fried MD Referring Cardiology [phone]          Clinic Interview:  Patient Findings     Negatives:   Signs/symptoms of thrombosis, Signs/symptoms of bleeding,   Laboratory test error suspected, Change in health, Change in alcohol use,   Change in activity, Upcoming invasive procedure, Emergency department   visit, Upcoming dental procedure, Missed doses, Extra doses, Change in   medications, Change in diet/appetite, Hospital admission, Bruising, Other   complaints      Clinical Outcomes     Negatives:   Major bleeding event, Thromboembolic event,   Anticoagulation-related hospital admission, Anticoagulation-related ED   visit, Anticoagulation-related fatality        INR History:  Anticoagulation Monitoring 11/4/2019 11/25/2019 12/30/2019   INR 4.2 3.4 3.1   INR Date 11/4/2019 11/25/2019 12/30/2019   INR Goal 2.5-3.5 2.5-3.5 2.5-3.5   Trend Same Same Same   Last Week Total 45 mg 45 mg 45 mg   Next Week Total 42.5 mg 45 mg 45 mg   Sun 7.5 mg 7.5 mg 7.5 mg   Mon 2.5 mg (11/4); Otherwise 5 mg 5 mg 5 mg   Tue 7.5 mg 7.5 mg 7.5 mg   Wed 5 mg 5 mg 5 mg   Thu 7.5 mg 7.5 mg  7.5 mg   Fri 5 mg 5 mg 5 mg   Sat 7.5 mg 7.5 mg 7.5 mg   Visit Report - - -   Some recent data might be hidden       Plan:  1. INR is Therapeutic today- see above in Anticoagulation Summary.  Will instruct Francisco Sequeira to Continue their warfarin regimen- see above in Anticoagulation Summary.  2. Follow up in 1 month  3. Patient declines warfarin refills.  4. Verbal and written information provided. Patient expresses understanding and has no further questions at this time.    Jacey Cason, Pharmacy Intern

## 2020-01-02 RX ORDER — DIGOXIN 125 MCG
TABLET ORAL
Qty: 90 TABLET | Refills: 0 | Status: SHIPPED | OUTPATIENT
Start: 2020-01-02 | End: 2020-03-30

## 2020-01-13 ENCOUNTER — APPOINTMENT (OUTPATIENT)
Dept: GENERAL RADIOLOGY | Facility: HOSPITAL | Age: 85
End: 2020-01-13

## 2020-01-13 ENCOUNTER — APPOINTMENT (OUTPATIENT)
Dept: CARDIOLOGY | Facility: HOSPITAL | Age: 85
End: 2020-01-13

## 2020-01-13 ENCOUNTER — APPOINTMENT (OUTPATIENT)
Dept: CT IMAGING | Facility: HOSPITAL | Age: 85
End: 2020-01-13

## 2020-01-13 ENCOUNTER — HOSPITAL ENCOUNTER (INPATIENT)
Facility: HOSPITAL | Age: 85
LOS: 7 days | Discharge: HOME HEALTH | End: 2020-01-21
Attending: EMERGENCY MEDICINE | Admitting: INTERNAL MEDICINE

## 2020-01-13 DIAGNOSIS — R29.90 STROKE-LIKE SYMPTOM: Primary | ICD-10-CM

## 2020-01-13 DIAGNOSIS — R79.1 SUBTHERAPEUTIC INTERNATIONAL NORMALIZED RATIO (INR): ICD-10-CM

## 2020-01-13 DIAGNOSIS — Z95.2 H/O MECHANICAL AORTIC VALVE REPLACEMENT: ICD-10-CM

## 2020-01-13 DIAGNOSIS — I63.9 ACUTE CEREBRAL INFARCTION (HCC): ICD-10-CM

## 2020-01-13 DIAGNOSIS — Z79.01 CURRENT USE OF LONG TERM ANTICOAGULATION: ICD-10-CM

## 2020-01-13 DIAGNOSIS — N17.9 ACUTE KIDNEY INJURY (HCC): ICD-10-CM

## 2020-01-13 PROBLEM — C64.9 KIDNEY CARCINOMA: Status: ACTIVE | Noted: 2020-01-13

## 2020-01-13 PROBLEM — N18.30 CKD (CHRONIC KIDNEY DISEASE) STAGE 3, GFR 30-59 ML/MIN (HCC): Status: ACTIVE | Noted: 2020-01-13

## 2020-01-13 LAB
ALBUMIN SERPL-MCNC: 3.9 G/DL (ref 3.5–5.2)
ALBUMIN/GLOB SERPL: 1.3 G/DL
ALP SERPL-CCNC: 80 U/L (ref 39–117)
ALT SERPL W P-5'-P-CCNC: 20 U/L (ref 1–41)
ANION GAP SERPL CALCULATED.3IONS-SCNC: 10.8 MMOL/L (ref 5–15)
ANION GAP SERPL CALCULATED.3IONS-SCNC: 14.6 MMOL/L (ref 5–15)
AORTIC DIMENSIONLESS INDEX: 0.4 (DI)
AST SERPL-CCNC: 18 U/L (ref 1–40)
BACTERIA UR QL AUTO: ABNORMAL /HPF
BASOPHILS # BLD AUTO: 0.03 10*3/MM3 (ref 0–0.2)
BASOPHILS NFR BLD AUTO: 0.6 % (ref 0–1.5)
BH CV ECHO MEAS - AO MAX PG (FULL): 10.7 MMHG
BH CV ECHO MEAS - AO MAX PG: 12.4 MMHG
BH CV ECHO MEAS - AO MEAN PG (FULL): 6 MMHG
BH CV ECHO MEAS - AO MEAN PG: 7 MMHG
BH CV ECHO MEAS - AO ROOT AREA (BSA CORRECTED): 1.2
BH CV ECHO MEAS - AO ROOT AREA: 4.9 CM^2
BH CV ECHO MEAS - AO ROOT DIAM: 2.5 CM
BH CV ECHO MEAS - AO V2 MAX: 176 CM/SEC
BH CV ECHO MEAS - AO V2 MEAN: 131 CM/SEC
BH CV ECHO MEAS - AO V2 VTI: 38.6 CM
BH CV ECHO MEAS - ASC AORTA: 2.8 CM
BH CV ECHO MEAS - AVA(I,A): 1.2 CM^2
BH CV ECHO MEAS - AVA(I,D): 1.2 CM^2
BH CV ECHO MEAS - AVA(V,A): 1.2 CM^2
BH CV ECHO MEAS - AVA(V,D): 1.2 CM^2
BH CV ECHO MEAS - BSA(HAYCOCK): 2 M^2
BH CV ECHO MEAS - BSA: 2 M^2
BH CV ECHO MEAS - BZI_BMI: 24.5 KILOGRAMS/M^2
BH CV ECHO MEAS - BZI_METRIC_HEIGHT: 182 CM
BH CV ECHO MEAS - BZI_METRIC_WEIGHT: 81 KG
BH CV ECHO MEAS - EDV(CUBED): 132.7 ML
BH CV ECHO MEAS - EDV(MOD-SP2): 66 ML
BH CV ECHO MEAS - EDV(MOD-SP4): 99 ML
BH CV ECHO MEAS - EDV(TEICH): 123.8 ML
BH CV ECHO MEAS - EF(CUBED): 40.1 %
BH CV ECHO MEAS - EF(MOD-BP): 60 %
BH CV ECHO MEAS - EF(MOD-SP2): 50 %
BH CV ECHO MEAS - EF(MOD-SP4): 65.7 %
BH CV ECHO MEAS - EF(TEICH): 32.9 %
BH CV ECHO MEAS - ESV(CUBED): 79.5 ML
BH CV ECHO MEAS - ESV(MOD-SP2): 33 ML
BH CV ECHO MEAS - ESV(MOD-SP4): 34 ML
BH CV ECHO MEAS - ESV(TEICH): 83.1 ML
BH CV ECHO MEAS - FS: 15.7 %
BH CV ECHO MEAS - IVS/LVPW: 1.1
BH CV ECHO MEAS - IVSD: 1.2 CM
BH CV ECHO MEAS - LAT PEAK E' VEL: 12.8 CM/SEC
BH CV ECHO MEAS - LV DIASTOLIC VOL/BSA (35-75): 48.9 ML/M^2
BH CV ECHO MEAS - LV MASS(C)D: 227.4 GRAMS
BH CV ECHO MEAS - LV MASS(C)DI: 112.4 GRAMS/M^2
BH CV ECHO MEAS - LV MAX PG: 1.7 MMHG
BH CV ECHO MEAS - LV MEAN PG: 1 MMHG
BH CV ECHO MEAS - LV SYSTOLIC VOL/BSA (12-30): 16.8 ML/M^2
BH CV ECHO MEAS - LV V1 MAX: 64.6 CM/SEC
BH CV ECHO MEAS - LV V1 MEAN: 46.3 CM/SEC
BH CV ECHO MEAS - LV V1 VTI: 14.3 CM
BH CV ECHO MEAS - LVIDD: 5.1 CM
BH CV ECHO MEAS - LVIDS: 4.3 CM
BH CV ECHO MEAS - LVLD AP2: 6.5 CM
BH CV ECHO MEAS - LVLD AP4: 7.2 CM
BH CV ECHO MEAS - LVLS AP2: 6.2 CM
BH CV ECHO MEAS - LVLS AP4: 6 CM
BH CV ECHO MEAS - LVOT AREA (M): 3.1 CM^2
BH CV ECHO MEAS - LVOT AREA: 3.1 CM^2
BH CV ECHO MEAS - LVOT DIAM: 2 CM
BH CV ECHO MEAS - LVPWD: 1.1 CM
BH CV ECHO MEAS - MED PEAK E' VEL: 9.46 CM/SEC
BH CV ECHO MEAS - MV A DUR: 106 SEC
BH CV ECHO MEAS - MV A MAX VEL: 29.4 CM/SEC
BH CV ECHO MEAS - MV DEC SLOPE: 400 CM/SEC^2
BH CV ECHO MEAS - MV DEC TIME: 177 SEC
BH CV ECHO MEAS - MV E MAX VEL: 112 CM/SEC
BH CV ECHO MEAS - MV E/A: 3.8
BH CV ECHO MEAS - MV MAX PG: 5 MMHG
BH CV ECHO MEAS - MV MEAN PG: 1 MMHG
BH CV ECHO MEAS - MV P1/2T MAX VEL: 119 CM/SEC
BH CV ECHO MEAS - MV P1/2T: 87.1 MSEC
BH CV ECHO MEAS - MV V2 MAX: 112 CM/SEC
BH CV ECHO MEAS - MV V2 MEAN: 52.1 CM/SEC
BH CV ECHO MEAS - MV V2 VTI: 27.1 CM
BH CV ECHO MEAS - MVA P1/2T LCG: 1.8 CM^2
BH CV ECHO MEAS - MVA(P1/2T): 2.5 CM^2
BH CV ECHO MEAS - MVA(VTI): 1.7 CM^2
BH CV ECHO MEAS - PA ACC TIME: 0.03 SEC
BH CV ECHO MEAS - PA MAX PG (FULL): 1.4 MMHG
BH CV ECHO MEAS - PA MAX PG: 2.6 MMHG
BH CV ECHO MEAS - PA PR(ACCEL): 66 MMHG
BH CV ECHO MEAS - PA V2 MAX: 80.1 CM/SEC
BH CV ECHO MEAS - PVA(V,A): 4.8 CM^2
BH CV ECHO MEAS - PVA(V,D): 4.8 CM^2
BH CV ECHO MEAS - QP/QS: 1.7
BH CV ECHO MEAS - RAP SYSTOLE: 15 MMHG
BH CV ECHO MEAS - RV MAX PG: 1.2 MMHG
BH CV ECHO MEAS - RV MEAN PG: 1 MMHG
BH CV ECHO MEAS - RV V1 MAX: 54.7 CM/SEC
BH CV ECHO MEAS - RV V1 MEAN: 38.9 CM/SEC
BH CV ECHO MEAS - RV V1 VTI: 10.8 CM
BH CV ECHO MEAS - RVOT AREA: 7.1 CM^2
BH CV ECHO MEAS - RVOT DIAM: 3 CM
BH CV ECHO MEAS - RVSP: 44.8 MMHG
BH CV ECHO MEAS - SI(AO): 93.7 ML/M^2
BH CV ECHO MEAS - SI(CUBED): 26.3 ML/M^2
BH CV ECHO MEAS - SI(LVOT): 22.2 ML/M^2
BH CV ECHO MEAS - SI(MOD-SP2): 16.3 ML/M^2
BH CV ECHO MEAS - SI(MOD-SP4): 32.1 ML/M^2
BH CV ECHO MEAS - SI(TEICH): 20.1 ML/M^2
BH CV ECHO MEAS - SV(AO): 189.5 ML
BH CV ECHO MEAS - SV(CUBED): 53.1 ML
BH CV ECHO MEAS - SV(LVOT): 44.9 ML
BH CV ECHO MEAS - SV(MOD-SP2): 33 ML
BH CV ECHO MEAS - SV(MOD-SP4): 65 ML
BH CV ECHO MEAS - SV(RVOT): 76.3 ML
BH CV ECHO MEAS - SV(TEICH): 40.7 ML
BH CV ECHO MEAS - TAPSE (>1.6): 1.5 CM
BH CV ECHO MEAS - TR MAX PG: 30 MMHG
BH CV ECHO MEAS - TR MAX VEL: 273 CM/SEC
BH CV ECHO MEASUREMENTS AVERAGE E/E' RATIO: 10.06
BH CV XLRA - RV BASE: 4.35 CM
BH CV XLRA - TDI S': 4.12 CM/SEC
BILIRUB SERPL-MCNC: 0.5 MG/DL (ref 0.2–1.2)
BILIRUB UR QL STRIP: NEGATIVE
BUN BLD-MCNC: 26 MG/DL (ref 8–23)
BUN BLD-MCNC: 29 MG/DL (ref 8–23)
BUN/CREAT SERPL: 14.2 (ref 7–25)
BUN/CREAT SERPL: 16 (ref 7–25)
CALCIUM SPEC-SCNC: 8.9 MG/DL (ref 8.6–10.5)
CALCIUM SPEC-SCNC: 9 MG/DL (ref 8.6–10.5)
CHLORIDE SERPL-SCNC: 103 MMOL/L (ref 98–107)
CHLORIDE SERPL-SCNC: 107 MMOL/L (ref 98–107)
CLARITY UR: CLEAR
CO2 SERPL-SCNC: 22.4 MMOL/L (ref 22–29)
CO2 SERPL-SCNC: 25.2 MMOL/L (ref 22–29)
COLOR UR: YELLOW
CREAT BLD-MCNC: 1.63 MG/DL (ref 0.76–1.27)
CREAT BLD-MCNC: 2.04 MG/DL (ref 0.76–1.27)
DEPRECATED RDW RBC AUTO: 42.3 FL (ref 37–54)
DIGOXIN SERPL-MCNC: 1.2 NG/ML (ref 0.6–1.2)
EOSINOPHIL # BLD AUTO: 0.08 10*3/MM3 (ref 0–0.4)
EOSINOPHIL NFR BLD AUTO: 1.7 % (ref 0.3–6.2)
ERYTHROCYTE [DISTWIDTH] IN BLOOD BY AUTOMATED COUNT: 14 % (ref 12.3–15.4)
GFR SERPL CREATININE-BSD FRML MDRD: 31 ML/MIN/1.73
GFR SERPL CREATININE-BSD FRML MDRD: 41 ML/MIN/1.73
GLOBULIN UR ELPH-MCNC: 2.9 GM/DL
GLUCOSE BLD-MCNC: 104 MG/DL (ref 65–99)
GLUCOSE BLD-MCNC: 68 MG/DL (ref 65–99)
GLUCOSE BLDC GLUCOMTR-MCNC: 199 MG/DL (ref 70–130)
GLUCOSE BLDC GLUCOMTR-MCNC: 219 MG/DL (ref 70–130)
GLUCOSE BLDC GLUCOMTR-MCNC: 46 MG/DL (ref 70–130)
GLUCOSE BLDC GLUCOMTR-MCNC: 52 MG/DL (ref 70–130)
GLUCOSE BLDC GLUCOMTR-MCNC: 69 MG/DL (ref 70–130)
GLUCOSE UR STRIP-MCNC: NEGATIVE MG/DL
HCT VFR BLD AUTO: 45.2 % (ref 37.5–51)
HGB BLD-MCNC: 14.8 G/DL (ref 13–17.7)
HGB UR QL STRIP.AUTO: NEGATIVE
HOLD SPECIMEN: NORMAL
HOLD SPECIMEN: NORMAL
HYALINE CASTS UR QL AUTO: ABNORMAL /LPF
IMM GRANULOCYTES # BLD AUTO: 0.02 10*3/MM3 (ref 0–0.05)
IMM GRANULOCYTES NFR BLD AUTO: 0.4 % (ref 0–0.5)
INR PPP: 2.21 (ref 0.9–1.1)
KETONES UR QL STRIP: NEGATIVE
LEFT ATRIUM VOLUME INDEX: 37.7 ML/M2
LEUKOCYTE ESTERASE UR QL STRIP.AUTO: ABNORMAL
LV EF 2D ECHO EST: 60 %
LYMPHOCYTES # BLD AUTO: 0.85 10*3/MM3 (ref 0.7–3.1)
LYMPHOCYTES NFR BLD AUTO: 18.3 % (ref 19.6–45.3)
MCH RBC QN AUTO: 27.8 PG (ref 26.6–33)
MCHC RBC AUTO-ENTMCNC: 32.7 G/DL (ref 31.5–35.7)
MCV RBC AUTO: 85 FL (ref 79–97)
MONOCYTES # BLD AUTO: 0.37 10*3/MM3 (ref 0.1–0.9)
MONOCYTES NFR BLD AUTO: 8 % (ref 5–12)
NEUTROPHILS # BLD AUTO: 3.3 10*3/MM3 (ref 1.7–7)
NEUTROPHILS NFR BLD AUTO: 71 % (ref 42.7–76)
NITRITE UR QL STRIP: NEGATIVE
NRBC BLD AUTO-RTO: 0 /100 WBC (ref 0–0.2)
PH UR STRIP.AUTO: 8 [PH] (ref 5–8)
PLATELET # BLD AUTO: 109 10*3/MM3 (ref 140–450)
PMV BLD AUTO: 12.7 FL (ref 6–12)
POTASSIUM BLD-SCNC: 4.4 MMOL/L (ref 3.5–5.2)
POTASSIUM BLD-SCNC: 4.9 MMOL/L (ref 3.5–5.2)
PROT SERPL-MCNC: 6.8 G/DL (ref 6–8.5)
PROT UR QL STRIP: ABNORMAL
PROTHROMBIN TIME: 24.2 SECONDS (ref 11.7–14.2)
RBC # BLD AUTO: 5.32 10*6/MM3 (ref 4.14–5.8)
RBC # UR: ABNORMAL /HPF
REF LAB TEST METHOD: ABNORMAL
SODIUM BLD-SCNC: 140 MMOL/L (ref 136–145)
SODIUM BLD-SCNC: 143 MMOL/L (ref 136–145)
SP GR UR STRIP: 1.02 (ref 1–1.03)
SQUAMOUS #/AREA URNS HPF: ABNORMAL /HPF
TROPONIN T SERPL-MCNC: <0.01 NG/ML (ref 0–0.03)
UROBILINOGEN UR QL STRIP: ABNORMAL
WBC NRBC COR # BLD: 4.65 10*3/MM3 (ref 3.4–10.8)
WBC UR QL AUTO: ABNORMAL /HPF
WHOLE BLOOD HOLD SPECIMEN: NORMAL
WHOLE BLOOD HOLD SPECIMEN: NORMAL

## 2020-01-13 PROCEDURE — 99285 EMERGENCY DEPT VISIT HI MDM: CPT

## 2020-01-13 PROCEDURE — 82962 GLUCOSE BLOOD TEST: CPT

## 2020-01-13 PROCEDURE — 70450 CT HEAD/BRAIN W/O DYE: CPT

## 2020-01-13 PROCEDURE — 93306 TTE W/DOPPLER COMPLETE: CPT | Performed by: INTERNAL MEDICINE

## 2020-01-13 PROCEDURE — 80053 COMPREHEN METABOLIC PANEL: CPT | Performed by: NURSE PRACTITIONER

## 2020-01-13 PROCEDURE — 81001 URINALYSIS AUTO W/SCOPE: CPT | Performed by: NURSE PRACTITIONER

## 2020-01-13 PROCEDURE — G0378 HOSPITAL OBSERVATION PER HR: HCPCS

## 2020-01-13 PROCEDURE — 71045 X-RAY EXAM CHEST 1 VIEW: CPT

## 2020-01-13 PROCEDURE — 36415 COLL VENOUS BLD VENIPUNCTURE: CPT | Performed by: NURSE PRACTITIONER

## 2020-01-13 PROCEDURE — 63710000001 INSULIN LISPRO (HUMAN) PER 5 UNITS: Performed by: NURSE PRACTITIONER

## 2020-01-13 PROCEDURE — 85025 COMPLETE CBC W/AUTO DIFF WBC: CPT | Performed by: NURSE PRACTITIONER

## 2020-01-13 PROCEDURE — 82607 VITAMIN B-12: CPT | Performed by: NURSE PRACTITIONER

## 2020-01-13 PROCEDURE — 84484 ASSAY OF TROPONIN QUANT: CPT | Performed by: NURSE PRACTITIONER

## 2020-01-13 PROCEDURE — 82746 ASSAY OF FOLIC ACID SERUM: CPT | Performed by: NURSE PRACTITIONER

## 2020-01-13 PROCEDURE — 93306 TTE W/DOPPLER COMPLETE: CPT

## 2020-01-13 PROCEDURE — 85610 PROTHROMBIN TIME: CPT | Performed by: NURSE PRACTITIONER

## 2020-01-13 PROCEDURE — 93010 ELECTROCARDIOGRAM REPORT: CPT | Performed by: INTERNAL MEDICINE

## 2020-01-13 PROCEDURE — 80162 ASSAY OF DIGOXIN TOTAL: CPT | Performed by: NURSE PRACTITIONER

## 2020-01-13 PROCEDURE — 80048 BASIC METABOLIC PNL TOTAL CA: CPT | Performed by: NURSE PRACTITIONER

## 2020-01-13 PROCEDURE — 93005 ELECTROCARDIOGRAM TRACING: CPT | Performed by: NURSE PRACTITIONER

## 2020-01-13 RX ORDER — SODIUM CHLORIDE 0.9 % (FLUSH) 0.9 %
10 SYRINGE (ML) INJECTION AS NEEDED
Status: DISCONTINUED | OUTPATIENT
Start: 2020-01-13 | End: 2020-01-21 | Stop reason: HOSPADM

## 2020-01-13 RX ORDER — WARFARIN SODIUM 5 MG/1
5 TABLET ORAL
Status: DISCONTINUED | OUTPATIENT
Start: 2020-01-13 | End: 2020-01-13 | Stop reason: DRUGHIGH

## 2020-01-13 RX ORDER — ASPIRIN 81 MG/1
324 TABLET, CHEWABLE ORAL ONCE
Status: COMPLETED | OUTPATIENT
Start: 2020-01-13 | End: 2020-01-13

## 2020-01-13 RX ORDER — ASPIRIN 325 MG
325 TABLET ORAL DAILY
Status: DISCONTINUED | OUTPATIENT
Start: 2020-01-14 | End: 2020-01-21 | Stop reason: HOSPADM

## 2020-01-13 RX ORDER — ASPIRIN 300 MG/1
300 SUPPOSITORY RECTAL DAILY
Status: DISCONTINUED | OUTPATIENT
Start: 2020-01-14 | End: 2020-01-21 | Stop reason: HOSPADM

## 2020-01-13 RX ORDER — ACETAMINOPHEN 160 MG/5ML
650 SOLUTION ORAL EVERY 4 HOURS PRN
Status: DISCONTINUED | OUTPATIENT
Start: 2020-01-13 | End: 2020-01-21 | Stop reason: HOSPADM

## 2020-01-13 RX ORDER — ATORVASTATIN CALCIUM 20 MG/1
TABLET, FILM COATED ORAL
Qty: 90 TABLET | Refills: 0 | Status: SHIPPED | OUTPATIENT
Start: 2020-01-13 | End: 2020-01-21 | Stop reason: HOSPADM

## 2020-01-13 RX ORDER — NITROGLYCERIN 0.4 MG/1
0.4 TABLET SUBLINGUAL
Status: DISCONTINUED | OUTPATIENT
Start: 2020-01-13 | End: 2020-01-21 | Stop reason: HOSPADM

## 2020-01-13 RX ORDER — SODIUM BICARBONATE 650 MG/1
1950 TABLET ORAL 2 TIMES DAILY
Status: DISCONTINUED | OUTPATIENT
Start: 2020-01-13 | End: 2020-01-21 | Stop reason: HOSPADM

## 2020-01-13 RX ORDER — DEXTROSE MONOHYDRATE 25 G/50ML
25 INJECTION, SOLUTION INTRAVENOUS
Status: DISCONTINUED | OUTPATIENT
Start: 2020-01-13 | End: 2020-01-21 | Stop reason: HOSPADM

## 2020-01-13 RX ORDER — SODIUM CHLORIDE 0.9 % (FLUSH) 0.9 %
10 SYRINGE (ML) INJECTION EVERY 12 HOURS SCHEDULED
Status: DISCONTINUED | OUTPATIENT
Start: 2020-01-13 | End: 2020-01-21 | Stop reason: HOSPADM

## 2020-01-13 RX ORDER — DIGOXIN 125 MCG
125 TABLET ORAL DAILY
Status: DISCONTINUED | OUTPATIENT
Start: 2020-01-14 | End: 2020-01-21 | Stop reason: HOSPADM

## 2020-01-13 RX ORDER — GUAIFENESIN 600 MG/1
600 TABLET, EXTENDED RELEASE ORAL EVERY 12 HOURS SCHEDULED
Status: DISCONTINUED | OUTPATIENT
Start: 2020-01-13 | End: 2020-01-21 | Stop reason: HOSPADM

## 2020-01-13 RX ORDER — ACETAMINOPHEN 325 MG/1
650 TABLET ORAL EVERY 4 HOURS PRN
Status: DISCONTINUED | OUTPATIENT
Start: 2020-01-13 | End: 2020-01-21 | Stop reason: HOSPADM

## 2020-01-13 RX ORDER — NICOTINE POLACRILEX 4 MG
15 LOZENGE BUCCAL
Status: DISCONTINUED | OUTPATIENT
Start: 2020-01-13 | End: 2020-01-21 | Stop reason: HOSPADM

## 2020-01-13 RX ORDER — ACETAMINOPHEN 650 MG/1
650 SUPPOSITORY RECTAL EVERY 4 HOURS PRN
Status: DISCONTINUED | OUTPATIENT
Start: 2020-01-13 | End: 2020-01-21 | Stop reason: HOSPADM

## 2020-01-13 RX ORDER — METOPROLOL SUCCINATE 25 MG/1
25 TABLET, EXTENDED RELEASE ORAL DAILY
Status: DISCONTINUED | OUTPATIENT
Start: 2020-01-14 | End: 2020-01-21 | Stop reason: HOSPADM

## 2020-01-13 RX ORDER — SODIUM CHLORIDE 0.9 % (FLUSH) 0.9 %
10 SYRINGE (ML) INJECTION EVERY 12 HOURS SCHEDULED
Status: DISCONTINUED | OUTPATIENT
Start: 2020-01-13 | End: 2020-01-16

## 2020-01-13 RX ORDER — ATORVASTATIN CALCIUM 20 MG/1
20 TABLET, FILM COATED ORAL DAILY
Status: DISCONTINUED | OUTPATIENT
Start: 2020-01-14 | End: 2020-01-16

## 2020-01-13 RX ORDER — WARFARIN SODIUM 7.5 MG/1
7.5 TABLET ORAL
Status: DISCONTINUED | OUTPATIENT
Start: 2020-01-13 | End: 2020-01-17

## 2020-01-13 RX ORDER — ONDANSETRON 2 MG/ML
4 INJECTION INTRAMUSCULAR; INTRAVENOUS EVERY 6 HOURS PRN
Status: DISCONTINUED | OUTPATIENT
Start: 2020-01-13 | End: 2020-01-21 | Stop reason: HOSPADM

## 2020-01-13 RX ORDER — INSULIN GLARGINE 100 [IU]/ML
40 INJECTION, SOLUTION SUBCUTANEOUS EVERY MORNING
Status: DISCONTINUED | OUTPATIENT
Start: 2020-01-14 | End: 2020-01-13

## 2020-01-13 RX ADMIN — DEXTROSE MONOHYDRATE 25 G: 25 INJECTION, SOLUTION INTRAVENOUS at 17:17

## 2020-01-13 RX ADMIN — SODIUM CHLORIDE, PRESERVATIVE FREE 10 ML: 5 INJECTION INTRAVENOUS at 23:14

## 2020-01-13 RX ADMIN — SODIUM CHLORIDE 1000 ML: 9 INJECTION, SOLUTION INTRAVENOUS at 15:18

## 2020-01-13 RX ADMIN — ASPIRIN 324 MG: 81 TABLET, CHEWABLE ORAL at 11:45

## 2020-01-13 RX ADMIN — SODIUM BICARBONATE 1950 MG: 650 TABLET ORAL at 23:00

## 2020-01-13 RX ADMIN — SODIUM CHLORIDE, PRESERVATIVE FREE 10 ML: 5 INJECTION INTRAVENOUS at 23:13

## 2020-01-13 RX ADMIN — GUAIFENESIN 600 MG: 600 TABLET, EXTENDED RELEASE ORAL at 23:00

## 2020-01-13 RX ADMIN — INSULIN LISPRO 3 UNITS: 100 INJECTION, SOLUTION INTRAVENOUS; SUBCUTANEOUS at 22:59

## 2020-01-13 NOTE — H&P
Patient Name:  Francisco Sequeira  YOB: 1935  MRN:  [id number]  Admit Date:  1/13/2020  Patient Care Team:  Rubin Hinkle APRN as PCP - General (Family Medicine)  Rubin Hinkle APRN as PCP - Claims Attributed  Audra Vazquez [facility] as Pharmacist  Vasquez Niño [facility] as Pharmacist (Pharmacy)      Subjective   History Present Illness     Chief Complaint   Patient presents with   • Weakness - Generalized     History of Present Illness   Mr. Sequeira is a 84 y.o. former smoker with a history of atrial fibrillation, HLD, HTN, aortic valve insufficiency s/p aortic metal heart valve, NICM, kidney cancer s/p nephrectomy, CKD3 and DM2 that presents to [facility] complaining of weakness. The patient states his symptoms began last night when he noticed he was having trouble walking. He felt like his legs were weak and that they were not working right. He woke up his wife and was noticed to be holding onto walls to get around. He thinks his left leg may have been slightly weaker than the right. He denies any speech, vision changes or other unilateral weakness. He also denies any nausea, vomiting, diarrhea, chest pain or dyspnea. He denies any prior history of CVA or TIA and takes his Coumadin without missed doses for his atrial fibrillation and valve. He follows with Dr. Fried for his Cardiologist. He denies any dysuria or urinary symptoms as well. In the ED, lab work showed a normal CBC, negative troponin and creatinine of 2.04. His baseline creatinine appears to be around 1.7 to 1.8. CT of his head was negative for acute findings. However, a soft tissue thickening over the right parietal calvarium was noted possibly from resolving scalp contusion. CXR was negative for acute findings. He is being admitted for his weakness. UA did show 21-30 WBC and 2+ bacteria. Negative nitrites. He appears in no apparent distress at this time.     Review of Systems   Constitutional: Negative for activity  change, appetite change, chills, fatigue and fever.   HENT: Negative for congestion, ear pain and postnasal drip.    Eyes: Negative for pain and discharge.   Respiratory: Negative for cough, choking, chest tightness and shortness of breath.    Cardiovascular: Negative for chest pain and leg swelling.   Gastrointestinal: Negative for abdominal distention, abdominal pain, diarrhea, nausea and vomiting.   Endocrine: Negative for cold intolerance and heat intolerance.   Genitourinary: Negative for difficulty urinating and dysuria.   Musculoskeletal: Positive for gait problem. Negative for arthralgias and back pain.   Skin: Negative for color change and pallor.   Neurological: Positive for weakness. Negative for dizziness and light-headedness.   Psychiatric/Behavioral: Negative for confusion. The patient is not nervous/anxious.       Personal History     Past Medical History:   Diagnosis Date   • Abnormal electrocardiogram    • Allergic rhinitis    • Aortic valve insufficiency    • Ascending aortic aneurysm (CMS/HCC)    • Atrial fibrillation (CMS/HCC)    • Bacteremia    • Calcific aortic stenosis of bicuspid valve    • Cardiac arrest (CMS/HCC)    • Cardiomyopathy (CMS/HCC)    • Contact dermatitis due to poison ivy    • Diabetes mellitus (CMS/HCC)    • Elbow fracture    • Esophageal reflux    • GERD (gastroesophageal reflux disease)    • Head injury    • Hyperglycemia    • Hyperlipidemia    • Hypertension    • Kidney carcinoma (CMS/HCC)    • Leg swelling    • Localized swelling of both lower legs    • Nasal congestion    • Nasal drainage    • Nonischemic cardiomyopathy (CMS/HCC)    • Palpitations    • Pedal edema    • Pleural effusion on left    • Renal insufficiency syndrome    • Renal oncocytoma    • Seasonal allergic reaction    • Sinusitis    • Syncope    • Type 2 diabetes mellitus (CMS/HCC)     uncontrolled   • Vision changes    • Visual field defect      Past Surgical History:   Procedure Laterality Date   • AORTIC  VALVE REPAIR/REPLACEMENT     • ASCENDING AORTIC ANEURYSM REPAIR W/ MECHANICAL AORTIC VALVE REPLACEMENT     • NEPHRECTOMY     • OTHER SURGICAL HISTORY      elbow surgery   • PROSTATE SURGERY     • THORACENTESIS Left     diagnostic     Family History   Problem Relation Age of Onset   • Cancer Mother         colon   • Cancer Brother         colon     Social History     Tobacco Use   • Smoking status: Former Smoker   • Smokeless tobacco: Former User   • Tobacco comment: caffeine use   Substance Use Topics   • Alcohol use: Yes     Comment: occasional   • Drug use: No       (Not in a hospital admission)  Allergies:    Allergies   Allergen Reactions   • Other      Grass, ragweed   • Penicillins    • Percocet  [Oxycodone-Acetaminophen]        Objective    Objective     Vital Signs  Temp:  [97.2 °F (36.2 °C)] 97.2 °F (36.2 °C)  Heart Rate:  [68-76] 68  Resp:  [18] 18  BP: (133-147)/() 147/87  SpO2:  [96 %-98 %] 98 %  on   ;   Device (Oxygen Therapy): room air  Body mass index is 24.41 kg/m².    Physical Exam   Constitutional: He is oriented to person, place, and time. He appears well-developed and well-nourished. No distress.   HENT:   Head: Normocephalic and atraumatic.   Nose: Nose normal.   Mouth/Throat: Oropharynx is clear and moist.   Eyes: Conjunctivae are normal. Right eye exhibits no discharge. Left eye exhibits no discharge.   Neck: Normal range of motion. Neck supple.   Cardiovascular: Normal heart sounds. An irregularly irregular rhythm present. Bradycardia present.   Pulmonary/Chest: Effort normal and breath sounds normal. No respiratory distress.   Abdominal: Soft. Bowel sounds are normal. He exhibits no distension. There is no tenderness.   Musculoskeletal: Normal range of motion. He exhibits no edema or tenderness.   Neurological: He is alert and oriented to person, place, and time. He exhibits normal muscle tone. Coordination normal.   Skin: Skin is warm and dry. He is not diaphoretic. No erythema.      Psychiatric: He has a normal mood and affect. His behavior is normal.   Nursing note and vitals reviewed.     Results Review:  I reviewed the patient's new clinical results.  I reviewed the patient's new imaging results and agree with the interpretation.  I reviewed the patient's other test results and agree with the interpretation  I personally viewed and interpreted the patient's EKG/Telemetry data  Discussed with ED provider.    Lab Results (last 24 hours)     Procedure Component Value Units Date/Time    POC Glucose Once [[id number]]  (Abnormal) Collected:  01/13/20 0742    Specimen:  Blood Updated:  01/13/20 0743     Glucose 69 mg/dL     Protime-INR [[id number]]  (Abnormal) Collected:  01/13/20 0747    Specimen:  Blood Updated:  01/13/20 0810     Protime 24.2 Seconds      INR 2.21    Digoxin Level [[id number]]  (Normal) Collected:  01/13/20 0747    Specimen:  Blood Updated:  01/13/20 0825     Digoxin 1.20 ng/mL     Narrative:       Results may be falsely increased if patient taking Biotin.      CBC & Differential [[id number]] Collected:  01/13/20 0747    Specimen:  Blood Updated:  01/13/20 0759    Narrative:       The following orders were created for panel order CBC & Differential.  Procedure                               Abnormality         Status                     ---------                               -----------         ------                     CBC Auto Differential[[id number]]        Abnormal            Final result                 Please view results for these tests on the individual orders.    Comprehensive Metabolic Panel [[id number]]  (Abnormal) Collected:  01/13/20 0747    Specimen:  Blood Updated:  01/13/20 0824     Glucose 68 mg/dL      BUN 29 mg/dL      Creatinine 2.04 mg/dL      Sodium 143 mmol/L      Potassium 4.4 mmol/L      Chloride 107 mmol/L      CO2 25.2 mmol/L      Calcium 8.9 mg/dL      Total Protein 6.8 g/dL      Albumin 3.90 g/dL      ALT (SGPT) 20 U/L      AST (SGOT) 18 U/L       Alkaline Phosphatase 80 U/L      Total Bilirubin 0.5 mg/dL      eGFR Non African Amer 31 mL/min/1.73      Globulin 2.9 gm/dL      A/G Ratio 1.3 g/dL      BUN/Creatinine Ratio 14.2     Anion Gap 10.8 mmol/L     Narrative:       GFR Normal >60  Chronic Kidney Disease <60  Kidney Failure <15      Troponin [[id number]]  (Normal) Collected:  01/13/20 0747    Specimen:  Blood Updated:  01/13/20 0824     Troponin T <0.010 ng/mL     Narrative:       Troponin T Reference Range:  <= 0.03 ng/mL-   Negative for AMI  >0.03 ng/mL-     Abnormal for myocardial necrosis.  Clinicians would have to utilize clinical acumen, EKG, Troponin and serial changes to determine if it is an Acute Myocardial Infarction or myocardial injury due to an underlying chronic condition.       Results may be falsely decreased if patient taking Biotin.      CBC Auto Differential [[id number]]  (Abnormal) Collected:  01/13/20 0747    Specimen:  Blood Updated:  01/13/20 0759     WBC 4.65 10*3/mm3      RBC 5.32 10*6/mm3      Hemoglobin 14.8 g/dL      Hematocrit 45.2 %      MCV 85.0 fL      MCH 27.8 pg      MCHC 32.7 g/dL      RDW 14.0 %      RDW-SD 42.3 fl      MPV 12.7 fL      Platelets 109 10*3/mm3      Neutrophil % 71.0 %      Lymphocyte % 18.3 %      Monocyte % 8.0 %      Eosinophil % 1.7 %      Basophil % 0.6 %      Immature Grans % 0.4 %      Neutrophils, Absolute 3.30 10*3/mm3      Lymphocytes, Absolute 0.85 10*3/mm3      Monocytes, Absolute 0.37 10*3/mm3      Eosinophils, Absolute 0.08 10*3/mm3      Basophils, Absolute 0.03 10*3/mm3      Immature Grans, Absolute 0.02 10*3/mm3      nRBC 0.0 /100 WBC     Urinalysis With Microscopic If Indicated (No Culture) - Urine, Clean Catch [[id number]]  (Abnormal) Collected:  01/13/20 1044    Specimen:  Urine, Clean Catch Updated:  01/13/20 1105     Color, UA Yellow     Appearance, UA Clear     pH, UA 8.0     Specific Gravity, UA 1.017     Glucose, UA Negative     Ketones, UA Negative     Bilirubin, UA Negative      Blood, UA Negative     Protein, UA Trace     Leuk Esterase, UA Small (1+)     Nitrite, UA Negative     Urobilinogen, UA 0.2 E.U./dL    Urinalysis, Microscopic Only - Urine, Clean Catch [[id number]]  (Abnormal) Collected:  01/13/20 1044    Specimen:  Urine, Clean Catch Updated:  01/13/20 1105     RBC, UA 0-2 /HPF      WBC, UA 21-30 /HPF      Bacteria, UA 2+ /HPF      Squamous Epithelial Cells, UA 0-2 /HPF      Hyaline Casts, UA 0-2 /LPF      Methodology Automated Microscopy    POC Glucose Once [[id number]]  (Abnormal) Collected:  01/13/20 1318    Specimen:  Blood Updated:  01/13/20 1319     Glucose 199 mg/dL           Imaging Results (Last 24 Hours)     Procedure Component Value Units Date/Time    CT Head Without Contrast [[id number]] Collected:  01/13/20 0820     Updated:  01/13/20 0839    Narrative:       CT HEAD     HISTORY:Lower extremity weakness, left greater than right     TECHNIQUE: CT scan of the head was obtained with 3 mm axial images  without intravenous contrast.  Radiation dose reduction techniques were  utilized, including automated exposure control and exposure modulation  based on body size.     COMPARISON:CT head 01/13/2016     FINDINGS:  There is no finding of acute infarct, hemorrhage, contusion or abnormal  extra-axial collection. No hydrocephalus is present. Focal subcutaneous  soft tissue thickening over the right parietal calvarium, possibly  representing a resolving scalp contusion.       Impression:       1.  No findings of acute intracranial abnormality.  2.  Other findings as above.        This report was finalized on 1/13/2020 8:36 AM by Dr. Antonio Hart M.D.       XR Chest 1 View [[id number]] Collected:  01/13/20 0801     Updated:  01/13/20 0805    Narrative:       XR CHEST 1 VW-  01/13/2020     HISTORY: Weakness. Shortness of breath.     Heart size is mildly enlarged. Lungs appear free of acute infiltrates.  Sternotomy wires are seen. Bony structures appear unremarkable.        Impression:       Mild cardiomegaly.  2. Lungs appear clear.     This report was finalized on 1/13/2020 8:02 AM by Dr. Onel Capps M.D.             Results for orders placed during the hospital encounter of 06/01/17   Adult Transthoracic Echo Complete    Narrative · Left ventricular systolic function is normal. Calculated EF = 53.1%.   Estimated EF was in agreement with the calculated EF. Normal left   ventricular cavity size and wall thickness noted. All left ventricular   wall segments contract normally.  · Left ventricular diastolic function was unable to be assessed.  · Left atrial volume is mildly increased.  · Right atrial cavity size is mildly dilated.  · The inferior vena cava is dilated.  · There is a prosthetic aortic valve. There is a mechanical valve present.   The prosthetic valve is normal.  · Mild-to-moderate mitral valve regurgitation is present.  · Moderate tricuspid valve regurgitation is present. Estimated right   ventricular systolic pressure from tricuspid regurgitation is mildly   elevated (35-45 mmHg).          ECG 12 Lead   Preliminary Result   HEART RATE= 69  bpm   RR Interval= 868  ms   ND Interval=   ms   P Horizontal Axis=   deg   P Front Axis=   deg   QRSD Interval= 99  ms   QT Interval= 394  ms   QRS Axis= -9  deg   T Wave Axis= 59  deg   - ABNORMAL ECG -   Atrial fibrillation   Ventricular premature complex   Electronically Signed By:    Date and Time of Study: 2020-01-13 07:58:22           Assessment/Plan     Active Hospital Problems    Diagnosis POA   • Stroke-like symptom [R29.90] Yes   • Kidney carcinoma (CMS/HCC) [C64.9] Yes   • CKD (chronic kidney disease) stage 3, GFR 30-59 ml/min (CMS/HCC) [N18.3] Yes   • Hx of aortic valve replacement, mechanical [Z95.2] [Z95.2] Not Applicable   • Uncontrolled type 2 diabetes mellitus (CMS/HCC) [E11.65] Yes   • Hyperlipidemia [E78.5] Yes   • Atrial fibrillation (CMS/HCC) [I48.91] Yes   • Hypertension [I10] Yes     Stroke-like  symptom  -CT head negative acute. MRI without ordered.  -Neurology consulted. PT/OT/ST per protocol.  -Neuro checks. Asa/statin. Hemoglobin A1c and lipid panel in AM.  -Check echocardiogram per protocol.    CKD3/kidney cancer/prior nephrectomy  -Close to baseline. Monitor renal function.  -Avoid nephrotoxins.    History of AVR/atrial fibrillation  -Rate controlled. INR 2.21. Continue anticoagulation with PT/INR daily.  -Restart home cardiac regimen.    DM2  -Stop V-go pump with humalog (nursing notified). Use SSI while admitted. Given low sugar in ED, hold long-acting insulin for now and readdress tomorrow. Can eat once bedside evaluation done.  -Check hemoglobin A1c tomorrow.  -SSI as needed. Monitor AC and HS.    HTN  -BP stable. Monitor.    HLD  -Lipid panel in AM. Statin ordered.    Abnormal UA  -Given lack of symptoms and only mild WBC and bacteria, will await culture before starting antibiotics. He has been afebrile with no leukocytosis. Re-evaluate tomorrow.    I discussed the patients findings and my recommendations with patient and family.    VTE Prophylaxis - Warfarin (home med).  Code Status - Full code. Confirmed with patient.       LIZA Anaya  [facility]  01/13/20  4:45 PM

## 2020-01-13 NOTE — PROGRESS NOTES
Pharmacy Consult Day #1: Warfarin Dosing/ Monitoring    Francisco Sequeira is a 84 y.o. male, who has a past medical history of Abnormal electrocardiogram, Allergic rhinitis, Aortic valve insufficiency, Ascending aortic aneurysm (CMS/HCC), Atrial fibrillation (CMS/HCC), Bacteremia, Calcific aortic stenosis of bicuspid valve, Cardiac arrest (CMS/HCC), Cardiomyopathy (CMS/HCC), Contact dermatitis due to poison ivy, Diabetes mellitus (CMS/HCC), Elbow fracture, Esophageal reflux, GERD (gastroesophageal reflux disease), Head injury, Hyperglycemia, Hyperlipidemia, Hypertension, Kidney carcinoma (CMS/HCC), Leg swelling, Localized swelling of both lower legs, Nasal congestion, Nasal drainage, Nonischemic cardiomyopathy (CMS/HCC), Palpitations, Pedal edema, Pleural effusion on left, Renal insufficiency syndrome, Renal oncocytoma, Seasonal allergic reaction, Sinusitis, Syncope, Type 2 diabetes mellitus (CMS/HCC), Vision changes, and Visual field defect.    Social History     Tobacco Use    Smoking status: Former Smoker    Smokeless tobacco: Former User    Tobacco comment: caffeine use   Substance Use Topics    Alcohol use: Yes     Comment: occasional    Drug use: No     Results from last 7 days   Lab Units 01/13/20  0747   INR  2.21*   HEMOGLOBIN g/dL 14.8   HEMATOCRIT % 45.2   PLATELETS 10*3/mm3 109*     Results from last 7 days   Lab Units 01/13/20  0747   SODIUM mmol/L 143   POTASSIUM mmol/L 4.4   CHLORIDE mmol/L 107   CO2 mmol/L 25.2   BUN mg/dL 29*   CREATININE mg/dL 2.04*   CALCIUM mg/dL 8.9   BILIRUBIN mg/dL 0.5   ALK PHOS U/L 80   ALT (SGPT) U/L 20   AST (SGOT) U/L 18   GLUCOSE mg/dL 68     Anticoagulation history: Per last [facility] from [facility] on 12/30, patient's home warfarin regimen is 5 mg qMWF and 7.5 mg TThSSun.    Hospital Anticoagulation:  Consulting provider: LIZA Zayas  Start date: 1/13/20  Indication: Aortic mechanical valve  Target INR: 2.5-3.5  Expected duration: Lifelong  Bridge Therapy:  None                Date 1/13            INR 2.21            Warfarin dose 7.5 mg              Potential drug interactions:   Aspirin 325 mg (major-new medication in hospital)-concurrent use can result in an increased risk of bleeding due to additive effects  Omega-3 Fatty Acids (major-home medication not restarted)-concurrent use decreases platelet aggregation due to inhibitory effect of fish oil on thromboxane A2 levels, resulting in an increased risk of bleeding.     Relevant nutrition status: Regular cardiac diet (carb consistent)    Education complete: No  Date:     Assessment  INR is currently slightly subtherapeutic at 2.21. Will give a one-time larger dose tonight to try and get INR within therapeutic range before going back to home regimen.    Plan:  Will give warfarin 7.5 mg once tonight, then reassess INR tomorrow morning. Daily PT/INR has already been ordered. Pharmacy will continue to follow until discharge or discontinuation of warfarin.     Twila Merchant, Pharm.D., BCPS   1/13/2020 6:21 PM

## 2020-01-13 NOTE — ED NOTES
Pt requesting food; per provider, pt cleared for PO intake following clear swallow screen.     Erik Mccallum RN  01/13/20 8129

## 2020-01-13 NOTE — ED PROVIDER NOTES
Patient presents to the ED c/o weakness beginning around 0830 yesterday worse to left leg. Pt also complains of difficulty ambulating but denies vomiting and diarrhea. Wife denies noticing slurred speech. Pt denies hx of CVA.       On exam,   General: Awake, alert, no acute distress  HEENT: Mucous membranes moist, atraumatic, normocephalic, EOMI  Neck: Full ROM  Pulm: Symmetric, non-labored, lungs CTAB  Cardiovascular: Regular rate and rhythm, normal S1/S2, intact distal pulses  GI: Soft, non-tender, non-distended, no rebound, no guarding, bowel sounds present  MSK: Full ROM, no deformity  Skin: Warm, dry  Neuro: Alert and oriented x 3, GCS 15, moving all extremities, no focal deficits, cranial nerves 2-12 intact, strength and sensation 5/5 in BUE and BLE, slight ataxia in LUE  Psych: Calm, cooperative    EKG    0758    Atrial fibrillation with rate of 69, isolated PVC, normal axis, normal intervals, no acute ischemic changes, no STEMI    No emergent changes as compared to July 2019    Interpreted Contemporaneously by me, independently viewed          I agree with the plan admit for further evaluation and workup for possible stroke vs TIA.        MD ATTESTATION NOTE    The AILIN and I have discussed this patient's history, physical exam, and treatment plan.  I have reviewed the documentation and personally had a face to face interaction with the patient. I affirm the documentation and agree with the treatment and plan.  The attached note describes my personal findings.      Documentation assistance provided by jordan Montoya for Dr. Hdz.  Information recorded by the scrtony was done at my direction and has been verified and validated by me.            Erika Montoya  01/13/20 1117       Sam Hdz MD  01/13/20 3118       Sam Hdz MD  01/13/20 3967

## 2020-01-13 NOTE — ED TRIAGE NOTES
Pt c/o generalized weakness and feeling unbalanced that started last night when getting out of the shower. Denies dizziness or vision issues.

## 2020-01-13 NOTE — ED PROVIDER NOTES
EMERGENCY DEPARTMENT ENCOUNTER    Room Number:  17/17  Date of encounter:  1/13/2020  PCP: Rubin Hinkle APRN  Historian: Patient, wife   Full history not obtainable due to: none     HPI:  Chief Complaint: Weakness     Context: Francisco Sequeira is a 84 y.o. male who presents to the ED c/o weakness onset yesterday morning at approximately 830am. Reports constant symptoms, moderate to severe intensity. Worse with getting up and moving. Notes his weakness is concentrated to his lower extremities although his left leg seems more affected than his right leg. He is able to walk but now needs assistance from his wife or has to hold on to something secondary to the weakness.     Hx aortic mechanical valve replacement and atrial fibrillation.       PAST MEDICAL HISTORY    Active Ambulatory Problems     Diagnosis Date Noted   • Atrial fibrillation (CMS/HCC)    • Hypertension    • Atopic rhinitis 03/21/2016   • Uncontrolled type 2 diabetes mellitus (CMS/HCC) 03/21/2016   • Gastroesophageal reflux disease 03/21/2016   • Hyperlipidemia 03/21/2016   • Renal insufficiency 03/31/2017   • Low testosterone 04/03/2017   • Special screening for malignant neoplasm of prostate 10/30/2017   • Hx of aortic valve replacement, mechanical [Z95.2] 09/19/2018   • Screening for iron deficiency anemia 11/01/2018     Resolved Ambulatory Problems     Diagnosis Date Noted   • Cardiomyopathy (CMS/HCC)    • Type 2 diabetes mellitus with complication (CMS/HCC) 03/21/2016   • Poison ivy 09/06/2016   • Low testosterone 04/07/2017   • Sinusitis 05/01/2018   • Hx of mitral valve replacement with mechanical valve [Z95.2] 09/19/2018     Past Medical History:   Diagnosis Date   • Abnormal electrocardiogram    • Allergic rhinitis    • Aortic valve insufficiency    • Ascending aortic aneurysm (CMS/HCC)    • Bacteremia    • Calcific aortic stenosis of bicuspid valve    • Cardiac arrest (CMS/HCC)    • Contact dermatitis due to poison ivy    • Diabetes mellitus  (CMS/HCC)    • Elbow fracture    • Esophageal reflux    • GERD (gastroesophageal reflux disease)    • Head injury    • Hyperglycemia    • Kidney carcinoma (CMS/HCC)    • Leg swelling    • Localized swelling of both lower legs    • Nasal congestion    • Nasal drainage    • Nonischemic cardiomyopathy (CMS/HCC)    • Palpitations    • Pedal edema    • Pleural effusion on left    • Renal insufficiency syndrome    • Renal oncocytoma    • Seasonal allergic reaction    • Syncope    • Type 2 diabetes mellitus (CMS/HCC)    • Vision changes    • Visual field defect          PAST SURGICAL HISTORY  Past Surgical History:   Procedure Laterality Date   • AORTIC VALVE REPAIR/REPLACEMENT     • ASCENDING AORTIC ANEURYSM REPAIR W/ MECHANICAL AORTIC VALVE REPLACEMENT     • NEPHRECTOMY     • OTHER SURGICAL HISTORY      elbow surgery   • PROSTATE SURGERY     • THORACENTESIS Left     diagnostic         FAMILY HISTORY  Family History   Problem Relation Age of Onset   • Cancer Mother         colon   • Cancer Brother         colon         SOCIAL HISTORY  Social History     Socioeconomic History   • Marital status:      Spouse name: Not on file   • Number of children: Not on file   • Years of education: Not on file   • Highest education level: Not on file   Tobacco Use   • Smoking status: Former Smoker   • Smokeless tobacco: Former User   • Tobacco comment: caffeine use   Substance and Sexual Activity   • Alcohol use: Yes     Comment: occasional   • Drug use: No   • Sexual activity: Defer         ALLERGIES  Other; Penicillins; and Percocet  [oxycodone-acetaminophen]        REVIEW OF SYSTEMS  Review of Systems   All systems reviewed and marked as negative except as listed in HPI       PHYSICAL EXAM    I have reviewed the triage vital signs and nursing notes.    ED Triage Vitals   Temp Heart Rate Resp BP SpO2   01/13/20 0653 01/13/20 0653 01/13/20 0653 01/13/20 0713 01/13/20 0653   97.2 °F (36.2 °C) 76 18 133/83 96 %      Temp src Heart  Rate Source Patient Position BP Location FiO2 (%)   01/13/20 0653 01/13/20 0653 01/13/20 0713 01/13/20 0713 --   Tympanic Monitor Lying Right arm        GENERAL: Alert well developed, well nourished in no distress  HENT: NCAT, neck supple, trachea midline  EYES: no scleral icterus, PERRL, normal conjunctiva  CV: irregular rhythm, regular rate, no murmur. Mechanical click heard best over aortic landmark  RESPIRATORY: unlabored effort, CTAB  ABDOMEN: soft, non-tender, non-distended, bowel sounds present  MUSCULOSKELETAL: no gross deformity  NEURO: alert,  sensory function of extremities grossly intact, speech clear, mental status normal/baseline. Very mild LUE dysmetria   SKIN: warm, dry, no rash  PSYCH:  Appropriate mood and affect    Vital signs and nursing notes reviewed.          LAB RESULTS  Recent Results (from the past 24 hour(s))   POC Glucose Once    Collection Time: 01/13/20  7:42 AM   Result Value Ref Range    Glucose 69 (L) 70 - 130 mg/dL   Protime-INR    Collection Time: 01/13/20  7:47 AM   Result Value Ref Range    Protime 24.2 (H) 11.7 - 14.2 Seconds    INR 2.21 (H) 0.90 - 1.10   Digoxin Level    Collection Time: 01/13/20  7:47 AM   Result Value Ref Range    Digoxin 1.20 0.60 - 1.20 ng/mL   Comprehensive Metabolic Panel    Collection Time: 01/13/20  7:47 AM   Result Value Ref Range    Glucose 68 65 - 99 mg/dL    BUN 29 (H) 8 - 23 mg/dL    Creatinine 2.04 (H) 0.76 - 1.27 mg/dL    Sodium 143 136 - 145 mmol/L    Potassium 4.4 3.5 - 5.2 mmol/L    Chloride 107 98 - 107 mmol/L    CO2 25.2 22.0 - 29.0 mmol/L    Calcium 8.9 8.6 - 10.5 mg/dL    Total Protein 6.8 6.0 - 8.5 g/dL    Albumin 3.90 3.50 - 5.20 g/dL    ALT (SGPT) 20 1 - 41 U/L    AST (SGOT) 18 1 - 40 U/L    Alkaline Phosphatase 80 39 - 117 U/L    Total Bilirubin 0.5 0.2 - 1.2 mg/dL    eGFR Non African Amer 31 (L) >60 mL/min/1.73    Globulin 2.9 gm/dL    A/G Ratio 1.3 g/dL    BUN/Creatinine Ratio 14.2 7.0 - 25.0    Anion Gap 10.8 5.0 - 15.0 mmol/L    Troponin    Collection Time: 01/13/20  7:47 AM   Result Value Ref Range    Troponin T <0.010 0.000 - 0.030 ng/mL   CBC Auto Differential    Collection Time: 01/13/20  7:47 AM   Result Value Ref Range    WBC 4.65 3.40 - 10.80 10*3/mm3    RBC 5.32 4.14 - 5.80 10*6/mm3    Hemoglobin 14.8 13.0 - 17.7 g/dL    Hematocrit 45.2 37.5 - 51.0 %    MCV 85.0 79.0 - 97.0 fL    MCH 27.8 26.6 - 33.0 pg    MCHC 32.7 31.5 - 35.7 g/dL    RDW 14.0 12.3 - 15.4 %    RDW-SD 42.3 37.0 - 54.0 fl    MPV 12.7 (H) 6.0 - 12.0 fL    Platelets 109 (L) 140 - 450 10*3/mm3    Neutrophil % 71.0 42.7 - 76.0 %    Lymphocyte % 18.3 (L) 19.6 - 45.3 %    Monocyte % 8.0 5.0 - 12.0 %    Eosinophil % 1.7 0.3 - 6.2 %    Basophil % 0.6 0.0 - 1.5 %    Immature Grans % 0.4 0.0 - 0.5 %    Neutrophils, Absolute 3.30 1.70 - 7.00 10*3/mm3    Lymphocytes, Absolute 0.85 0.70 - 3.10 10*3/mm3    Monocytes, Absolute 0.37 0.10 - 0.90 10*3/mm3    Eosinophils, Absolute 0.08 0.00 - 0.40 10*3/mm3    Basophils, Absolute 0.03 0.00 - 0.20 10*3/mm3    Immature Grans, Absolute 0.02 0.00 - 0.05 10*3/mm3    nRBC 0.0 0.0 - 0.2 /100 WBC   Light Blue Top    Collection Time: 01/13/20  7:47 AM   Result Value Ref Range    Extra Tube hold for add-on    Green Top (Gel)    Collection Time: 01/13/20  7:47 AM   Result Value Ref Range    Extra Tube Hold for add-ons.    Lavender Top    Collection Time: 01/13/20  7:47 AM   Result Value Ref Range    Extra Tube hold for add-on    Gold Top - SST    Collection Time: 01/13/20  7:47 AM   Result Value Ref Range    Extra Tube Hold for add-ons.    Urinalysis With Microscopic If Indicated (No Culture) - Urine, Clean Catch    Collection Time: 01/13/20 10:44 AM   Result Value Ref Range    Color, UA Yellow Yellow, Straw    Appearance, UA Clear Clear    pH, UA 8.0 5.0 - 8.0    Specific Gravity, UA 1.017 1.005 - 1.030    Glucose, UA Negative Negative    Ketones, UA Negative Negative    Bilirubin, UA Negative Negative    Blood, UA Negative Negative    Protein,  UA Trace (A) Negative    Leuk Esterase, UA Small (1+) (A) Negative    Nitrite, UA Negative Negative    Urobilinogen, UA 0.2 E.U./dL 0.2 - 1.0 E.U./dL   Urinalysis, Microscopic Only - Urine, Clean Catch    Collection Time: 01/13/20 10:44 AM   Result Value Ref Range    RBC, UA 0-2 None Seen, 0-2 /HPF    WBC, UA 21-30 (A) None Seen, 0-2 /HPF    Bacteria, UA 2+ (A) None Seen /HPF    Squamous Epithelial Cells, UA 0-2 None Seen, 0-2 /HPF    Hyaline Casts, UA 0-2 None Seen /LPF    Methodology Automated Microscopy        Ordered the above labs and independently reviewed the results.        RADIOLOGY  Ct Head Without Contrast    Result Date: 1/13/2020  CT HEAD  HISTORY:Lower extremity weakness, left greater than right  TECHNIQUE: CT scan of the head was obtained with 3 mm axial images without intravenous contrast.  Radiation dose reduction techniques were utilized, including automated exposure control and exposure modulation based on body size.  COMPARISON:CT head 01/13/2016  FINDINGS: There is no finding of acute infarct, hemorrhage, contusion or abnormal extra-axial collection. No hydrocephalus is present. Focal subcutaneous soft tissue thickening over the right parietal calvarium, possibly representing a resolving scalp contusion.      1.  No findings of acute intracranial abnormality. 2.  Other findings as above.   This report was finalized on 1/13/2020 8:36 AM by Dr. Antonio Hart M.D.      Xr Chest 1 View    Result Date: 1/13/2020  XR CHEST 1 VW-  01/13/2020  HISTORY: Weakness. Shortness of breath.  Heart size is mildly enlarged. Lungs appear free of acute infiltrates. Sternotomy wires are seen. Bony structures appear unremarkable.      Mild cardiomegaly. 2. Lungs appear clear.  This report was finalized on 1/13/2020 8:02 AM by Dr. Onel Capps M.D.        I ordered the above noted radiological studies. Independently reviewed by me and discussed with radiologist.  See dictation above for official radiology  interpretation.      PROCEDURES    Procedures        MEDICATIONS GIVEN IN ER    Medications   sodium chloride 0.9 % flush 10 mL (has no administration in time range)   aspirin chewable tablet 324 mg (has no administration in time range)   sodium chloride 0.9 % bolus 1,000 mL (has no administration in time range)         PROGRESS, DATA ANALYSIS, CONSULTS, AND MEDICAL DECISION MAKING    All labs have been independently reviewed by me.  All radiology studies have been reviewed by me and discussed with radiologist dictating report.   EKG's independently reviewed by me.  Discussion below represents my analysis of pertinent findings related to patient's condition, differential diagnosis, treatment plan and final disposition.      ED Course as of Jan 13 1129   Mon Jan 13, 2020   0811 Symptoms concerning for stroke however no definite timeline. Weakness was definitely present yesterday at 830am, greater than or equal to 24 hours of onset.     [JS]   0900 Creatinine(!): 2.04 [JS]   1108 CT head negative. Consult placed to A for admission.  Nursing swallow screen and aspirin ordered.    [JS]   1116 With Dr. Rice for A.  He will admit the patient to the hospital.  Consult placed to stroke neurology.    [JS]   1117 Updated the patient on plan for admission.  He and wife are agreeable.  Bed requested.  All questions answered.  Ready for admission.    [JS]   1118 EKG          EKG time: 0758  Rhythm/Rate:Atrial fibrillation 69 with rare PVC  P waves and NJ: absent   QRS, axis: Normal    ST and T waves: Nonspecific ST changes      Interpreted Contemporaneously by me, independently viewed  unchanged compared to prior 7/23/19      [JS]   1129 Spoke with Dr Overton who will consult for stroke neurology      [JS]      ED Course User Index  [JS] Carmen Mclean APRN       AS OF 11:29 AM VITALS:    BP - (!) 146/101  HR - 68  TEMP - 97.2 °F (36.2 °C) (Tympanic)  02 SATS - 97%        DIAGNOSIS  Final diagnoses:   Stroke-like  symptom   H/O mechanical aortic valve replacement   Current use of long term anticoagulation   Subtherapeutic international normalized ratio (INR)   Acute kidney injury (CMS/HCC)         DISPOSITION  Admission        Carmen Mclean, LIZA  01/13/20 1129       Carmen Mclean, LIZA  01/13/20 1217

## 2020-01-13 NOTE — ED NOTES
"Pt c/o generalized weakness. Last week pt fell out of shower. Pt says weakness began yesterday. Pt says \"I am mainly weak in my legs and I have to hold on to something when I walk.\" per wife pt has most trouble with left leg. Pt denies cp, soa, n/v, fevers. Pt hx of afib.     Dilma Burrows, RN  01/13/20 0713    "

## 2020-01-13 NOTE — SIGNIFICANT NOTE
S/w nurse regarding low sugars. She states she has treated. Patient v-go removed. Holding long-acting insulin. Diet ordered pending normal bedside eval. Encouraged Po intake to avoid lower sugars. Monitor closely and treat hypoglycemia per protocol. Will further monitor trends tomorrow. Neurology to see. Orders per stroke protocol initiated.

## 2020-01-13 NOTE — ED NOTES
Pt unable to urinate at this time; advised to provide urine as soon as able.     Erik Mccallum RN  01/13/20 0995

## 2020-01-14 ENCOUNTER — APPOINTMENT (OUTPATIENT)
Dept: MRI IMAGING | Facility: HOSPITAL | Age: 85
End: 2020-01-14

## 2020-01-14 PROBLEM — N17.9 AKI (ACUTE KIDNEY INJURY): Status: ACTIVE | Noted: 2020-01-14

## 2020-01-14 PROBLEM — I63.9 ACUTE CEREBRAL INFARCTION: Status: ACTIVE | Noted: 2020-01-14

## 2020-01-14 LAB
ANION GAP SERPL CALCULATED.3IONS-SCNC: 11.8 MMOL/L (ref 5–15)
BUN BLD-MCNC: 26 MG/DL (ref 8–23)
BUN/CREAT SERPL: 18.8 (ref 7–25)
CALCIUM SPEC-SCNC: 9 MG/DL (ref 8.6–10.5)
CHLORIDE SERPL-SCNC: 106 MMOL/L (ref 98–107)
CHOLEST SERPL-MCNC: 154 MG/DL (ref 0–200)
CO2 SERPL-SCNC: 20.2 MMOL/L (ref 22–29)
CREAT BLD-MCNC: 1.38 MG/DL (ref 0.76–1.27)
CRP SERPL-MCNC: 0.3 MG/DL (ref 0–0.5)
DEPRECATED RDW RBC AUTO: 42.9 FL (ref 37–54)
ERYTHROCYTE [DISTWIDTH] IN BLOOD BY AUTOMATED COUNT: 13.8 % (ref 12.3–15.4)
GFR SERPL CREATININE-BSD FRML MDRD: 49 ML/MIN/1.73
GLUCOSE BLD-MCNC: 127 MG/DL (ref 65–99)
GLUCOSE BLDC GLUCOMTR-MCNC: 123 MG/DL (ref 70–130)
GLUCOSE BLDC GLUCOMTR-MCNC: 153 MG/DL (ref 70–130)
GLUCOSE BLDC GLUCOMTR-MCNC: 207 MG/DL (ref 70–130)
GLUCOSE BLDC GLUCOMTR-MCNC: 246 MG/DL (ref 70–130)
HBA1C MFR BLD: 7.4 % (ref 4.8–5.6)
HCT VFR BLD AUTO: 47.5 % (ref 37.5–51)
HDLC SERPL-MCNC: 34 MG/DL (ref 40–60)
HGB BLD-MCNC: 15.6 G/DL (ref 13–17.7)
INR PPP: 1.99 (ref 0.9–1.1)
LDLC SERPL CALC-MCNC: 88 MG/DL (ref 0–100)
LDLC/HDLC SERPL: 2.58 {RATIO}
MCH RBC QN AUTO: 28.2 PG (ref 26.6–33)
MCHC RBC AUTO-ENTMCNC: 32.8 G/DL (ref 31.5–35.7)
MCV RBC AUTO: 85.7 FL (ref 79–97)
PLATELET # BLD AUTO: 98 10*3/MM3 (ref 140–450)
PMV BLD AUTO: 12.7 FL (ref 6–12)
POTASSIUM BLD-SCNC: 4.9 MMOL/L (ref 3.5–5.2)
PROTHROMBIN TIME: 22.2 SECONDS (ref 11.7–14.2)
RBC # BLD AUTO: 5.54 10*6/MM3 (ref 4.14–5.8)
SODIUM BLD-SCNC: 138 MMOL/L (ref 136–145)
TRIGL SERPL-MCNC: 161 MG/DL (ref 0–150)
VLDLC SERPL-MCNC: 32.2 MG/DL (ref 5–40)
WBC NRBC COR # BLD: 6.19 10*3/MM3 (ref 3.4–10.8)

## 2020-01-14 PROCEDURE — 63710000001 INSULIN LISPRO (HUMAN) PER 5 UNITS: Performed by: NURSE PRACTITIONER

## 2020-01-14 PROCEDURE — 83036 HEMOGLOBIN GLYCOSYLATED A1C: CPT | Performed by: NURSE PRACTITIONER

## 2020-01-14 PROCEDURE — 25010000002 ENOXAPARIN PER 10 MG: Performed by: PSYCHIATRY & NEUROLOGY

## 2020-01-14 PROCEDURE — 85027 COMPLETE CBC AUTOMATED: CPT | Performed by: NURSE PRACTITIONER

## 2020-01-14 PROCEDURE — 85610 PROTHROMBIN TIME: CPT | Performed by: NURSE PRACTITIONER

## 2020-01-14 PROCEDURE — 97162 PT EVAL MOD COMPLEX 30 MIN: CPT

## 2020-01-14 PROCEDURE — 82962 GLUCOSE BLOOD TEST: CPT

## 2020-01-14 PROCEDURE — 80061 LIPID PANEL: CPT | Performed by: NURSE PRACTITIONER

## 2020-01-14 PROCEDURE — 97110 THERAPEUTIC EXERCISES: CPT

## 2020-01-14 PROCEDURE — 97165 OT EVAL LOW COMPLEX 30 MIN: CPT

## 2020-01-14 PROCEDURE — 70551 MRI BRAIN STEM W/O DYE: CPT

## 2020-01-14 PROCEDURE — 86140 C-REACTIVE PROTEIN: CPT | Performed by: PSYCHIATRY & NEUROLOGY

## 2020-01-14 PROCEDURE — 80048 BASIC METABOLIC PNL TOTAL CA: CPT | Performed by: NURSE PRACTITIONER

## 2020-01-14 PROCEDURE — 99221 1ST HOSP IP/OBS SF/LOW 40: CPT | Performed by: PSYCHIATRY & NEUROLOGY

## 2020-01-14 RX ORDER — WARFARIN SODIUM 7.5 MG/1
7.5 TABLET ORAL
Status: COMPLETED | OUTPATIENT
Start: 2020-01-14 | End: 2020-01-14

## 2020-01-14 RX ADMIN — SODIUM CHLORIDE, PRESERVATIVE FREE 10 ML: 5 INJECTION INTRAVENOUS at 21:31

## 2020-01-14 RX ADMIN — SODIUM CHLORIDE, PRESERVATIVE FREE 10 ML: 5 INJECTION INTRAVENOUS at 09:35

## 2020-01-14 RX ADMIN — METOPROLOL SUCCINATE 25 MG: 25 TABLET, EXTENDED RELEASE ORAL at 09:31

## 2020-01-14 RX ADMIN — GUAIFENESIN 600 MG: 600 TABLET, EXTENDED RELEASE ORAL at 21:28

## 2020-01-14 RX ADMIN — SODIUM CHLORIDE, PRESERVATIVE FREE 10 ML: 5 INJECTION INTRAVENOUS at 23:18

## 2020-01-14 RX ADMIN — WARFARIN SODIUM 7.5 MG: 7.5 TABLET ORAL at 17:50

## 2020-01-14 RX ADMIN — Medication 400 MG: at 09:31

## 2020-01-14 RX ADMIN — ENOXAPARIN SODIUM 80 MG: 80 INJECTION SUBCUTANEOUS at 17:48

## 2020-01-14 RX ADMIN — INSULIN LISPRO 3 UNITS: 100 INJECTION, SOLUTION INTRAVENOUS; SUBCUTANEOUS at 21:29

## 2020-01-14 RX ADMIN — ATORVASTATIN CALCIUM 20 MG: 20 TABLET, FILM COATED ORAL at 09:31

## 2020-01-14 RX ADMIN — DIGOXIN 125 MCG: 125 TABLET ORAL at 09:31

## 2020-01-14 RX ADMIN — INSULIN LISPRO 3 UNITS: 100 INJECTION, SOLUTION INTRAVENOUS; SUBCUTANEOUS at 12:54

## 2020-01-14 RX ADMIN — INSULIN LISPRO 2 UNITS: 100 INJECTION, SOLUTION INTRAVENOUS; SUBCUTANEOUS at 17:48

## 2020-01-14 RX ADMIN — ASPIRIN 325 MG: 325 TABLET ORAL at 09:31

## 2020-01-14 RX ADMIN — GUAIFENESIN 600 MG: 600 TABLET, EXTENDED RELEASE ORAL at 09:31

## 2020-01-14 RX ADMIN — SODIUM BICARBONATE 1950 MG: 650 TABLET ORAL at 21:29

## 2020-01-14 RX ADMIN — SODIUM BICARBONATE 1950 MG: 650 TABLET ORAL at 09:31

## 2020-01-14 NOTE — PROGRESS NOTES
Discharge Planning Assessment  [facility]     Patient Name: Francisco Sequeira  MRN: [id number]  Today's Date: 1/14/2020    Admit Date: 1/13/2020    Discharge Needs Assessment     Row Name 01/14/20 1507       Living Environment    Lives With  spouse    Current Living Arrangements  home/apartment/condo    Primary Care Provided by  self;spouse/significant other    Family Caregiver if Needed  spouse    Quality of Family Relationships  helpful;involved;supportive    Able to Return to Prior Arrangements  yes       Transition Planning    Patient/Family Anticipates Transition to  home with family;home with help/services    Patient/Family Anticipated Services at Transition  home health care    Transportation Anticipated  family or friend will provide;car, drives self       Discharge Needs Assessment    Readmission Within the Last 30 Days  no previous admission in last 30 days    Concerns to be Addressed  discharge planning    Equipment Currently Used at Home  none    Provided post acute provider list?  Yes    Post Acute Provider List  Home Health;Nursing Home    Delivered To  Patient;Support Person    Method of Delivery  In person        Discharge Plan     Row Name 01/14/20 1509       Plan    Plan  Plan at present is home with his wife.  Referral to [facility].      Plan Comments  Spoke with pt, his wife Gladys and son Bruce at bedside.  Facesheet and pharmacy info confirmed.  Pt lives in a house with his wife Gladys (who works full time), is IADLs and can drive.  He says Gladys does most of the driving, as well as assist him with his meds and household chores.  He has had [facility] in the past and would want them again.  Referral called to [facility] Intake.  No hx of SNF.  CCP discussed DC options including HH and SNF.  Pt does not feel he will need SNF upon DC, but is in agreement with HH.   Gladys expressed concern about pt being home alone while she is at work.  Pvt pay caregiver list given.                Home Medical  Care      Service Provider Request Status Selected Services Address Phone Number Fax Number    [facility] Accepted N/A [address] [place] [id number] [phone]      Therapy      Coordination has not been started for this encounter.      Community Resources      Coordination has not been started for this encounter.          Demographic Summary     Row Name 01/14/20 1506       General Information    Admission Type  inpatient    Arrived From  home    Referral Source  admission list    Reason for Consult  discharge planning    Preferred Language  English        Functional Status     Row Name 01/14/20 1506       Functional Status    Usual Activity Tolerance  moderate    Current Activity Tolerance  fair       Functional Status, IADL    Medications  assistive person    Meal Preparation  assistive person    Housekeeping  assistive person    Laundry  assistive person    Shopping  assistive person       Mental Status    General Appearance WDL  WDL       Employment/    Employment Status  retired          Patient Forms     Row Name 01/14/20 1508       Patient Forms    Provider Choice List  Delivered    Delivered to  Patient    Method of delivery  In person            Erlinda Dyer RN

## 2020-01-14 NOTE — THERAPY EVALUATION
Acute Care - Occupational Therapy Initial Evaluation  [facility]     Patient Name: Francisco Sequeira  : 1935  MRN: [id number]  Today's Date: 2020             Admit Date: 2020       ICD-10-CM ICD-9-CM   1. Stroke-like symptom R29.90 781.99   2. H/O mechanical aortic valve replacement Z95.2 V43.3   3. Current use of long term anticoagulation Z79.01 V58.61   4. Subtherapeutic international normalized ratio (INR) R79.1 790.92   5. Acute kidney injury (CMS/HCC) N17.9 584.9     Patient Active Problem List   Diagnosis   • Atrial fibrillation (CMS/HCC)   • Hypertension   • Atopic rhinitis   • Uncontrolled type 2 diabetes mellitus (CMS/HCC)   • Gastroesophageal reflux disease   • Hyperlipidemia   • Renal insufficiency   • Low testosterone   • Special screening for malignant neoplasm of prostate   • Hx of aortic valve replacement, mechanical [Z95.2]   • Screening for iron deficiency anemia   • Stroke-like symptom   • Kidney carcinoma (CMS/HCC)   • CKD (chronic kidney disease) stage 3, GFR 30-59 ml/min (CMS/HCC)   • BYRON (acute kidney injury) (CMS/HCC)   • Acute cerebral infarction (CMS/HCC)     Past Medical History:   Diagnosis Date   • Abnormal electrocardiogram    • Allergic rhinitis    • Aortic valve insufficiency    • Ascending aortic aneurysm (CMS/HCC)    • Atrial fibrillation (CMS/HCC)    • Bacteremia    • Calcific aortic stenosis of bicuspid valve    • Cardiac arrest (CMS/HCC)    • Cardiomyopathy (CMS/HCC)    • Contact dermatitis due to poison ivy    • Diabetes mellitus (CMS/HCC)    • Elbow fracture    • Esophageal reflux    • GERD (gastroesophageal reflux disease)    • Head injury    • Hyperglycemia    • Hyperlipidemia    • Hypertension    • Kidney carcinoma (CMS/HCC)    • Leg swelling    • Localized swelling of both lower legs    • Nasal congestion    • Nasal drainage    • Nonischemic cardiomyopathy (CMS/HCC)    • Palpitations    • Pedal edema    • Pleural effusion on left    • Renal insufficiency  syndrome    • Renal oncocytoma    • Seasonal allergic reaction    • Sinusitis    • Syncope    • Type 2 diabetes mellitus (CMS/HCC)     uncontrolled   • Vision changes    • Visual field defect      Past Surgical History:   Procedure Laterality Date   • AORTIC VALVE REPAIR/REPLACEMENT     • ASCENDING AORTIC ANEURYSM REPAIR W/ MECHANICAL AORTIC VALVE REPLACEMENT     • NEPHRECTOMY     • OTHER SURGICAL HISTORY      elbow surgery   • PROSTATE SURGERY     • THORACENTESIS Left     diagnostic          OT ASSESSMENT FLOWSHEET (last 12 hours)      Occupational Therapy Evaluation     Row Name 01/14/20 0958                   OT Evaluation Time/Intention    Document Type  evaluation  -SG        Mode of Treatment  individual therapy;occupational therapy  -SG        Patient Effort  good  -SG           General Information    Patient Profile Reviewed?  yes  -SG        Prior Level of Function  independent:  -SG        Existing Precautions/Restrictions  fall  -SG           Cognitive Assessment/Intervention- PT/OT    Orientation Status (Cognition)  oriented x 3  -SG        Follows Commands (Cognition)  follows one step commands  -SG           Safety Issues, Functional Mobility    Impairments Affecting Function (Mobility)  balance;coordination;endurance/activity tolerance;strength  -SG           General ROM    GENERAL ROM COMMENTS  BUE's WFL AROM, decreased coordination throughout LUE  -SG           Motor Assessment/Interventions    Additional Documentation  Therapeutic Exercise (Group);Therapeutic Exercise Interventions (Group)  -SG           Therapeutic Exercise    Upper Extremity Range of Motion (Therapeutic Exercise)  shoulder flexion/extension, left;elbow flexion/extension, left;forearm supination/pronation, left;wrist flexion/extension, left  -SG        Exercise Type (Therapeutic Exercise)  AROM (active range of motion)  -SG        Position (Therapeutic Exercise)  seated  -SG        Expected Outcome (Therapeutic Exercise)  improve  motor control;improve performance, BADLs  -SG        Comment (Therapeutic Exercise)  instructed pt and spouse with safety for LUE and exercise /activity to perform on own for LUE coordination.   -SG           Static Standing Balance    Level of Jenkins (Supported Standing, Static Balance)  contact guard assist  -SG        Assistive Device Utilized (Supported Standing, Static Balance)  walker, rolling  -SG           Positioning and Restraints    Pre-Treatment Position  sitting in chair/recliner  -SG        Post Treatment Position  chair  -SG        In Chair  sitting;call light within reach;encouraged to call for assist;with family/caregiver;with other staff transport present to take to testing  -SG           Pain Scale: Numbers Pre/Post-Treatment    Pain Scale: Numbers, Pretreatment  0/10 - no pain  -SG        Pain Scale: Numbers, Post-Treatment  0/10 - no pain  -SG           Plan of Care Review    Plan of Care Reviewed With  patient  -SG           Clinical Impression (OT)    OT Diagnosis  need for assist with personal care  -SG        Criteria for Skilled Therapeutic Interventions Met (OT Eval)  yes;treatment indicated  -SG        Therapy Frequency (OT Eval)  5 times/wk  -SG        Care Plan Review (OT)  evaluation/treatment results reviewed  -SG           Planned OT Interventions    Planned Therapy Interventions (OT Eval)  BADL retraining;neuromuscular control/coordination retraining;transfer/mobility retraining  -SG           OT Goals    Transfer Goal Selection (OT)  transfer, OT goal 1  -SG        Dressing Goal Selection (OT)  dressing, OT goal 1  -SG        Coordination Goal Selection (OT)  coordination, OT goal 1  -SG        Additional Documentation  Coordination Goal Selection (OT) (Row)  -SG           Transfer Goal 1 (OT)    Activity/Assistive Device (Transfer Goal 1, OT)  toilet  -SG        Jenkins Level/Cues Needed (Transfer Goal 1, OT)  contact guard assist  -SG        Time Frame (Transfer Goal  1, OT)  1 week  -SG        Progress/Outcome (Transfer Goal 1, OT)  goal ongoing  -SG           Dressing Goal 1 (OT)    Activity/Assistive Device (Dressing Goal 1, OT)  dressing skills, all  -SG        Union/Cues Needed (Dressing Goal 1, OT)  contact guard assist  -SG        Time Frame (Dressing Goal 1, OT)  1 week  -SG        Progress/Outcome (Dressing Goal 1, OT)  goal ongoing  -SG           Coordination Goal 1 (OT)    Activity/Assistive Device (Coordination Goal 1, OT)  GM task to assist with adls.    -SG        Union Level/Cues Needed (Coordination Goal 1, OT)  minimum assist (75% or more patient effort)  -SG        Time Frame (Coordination Goal 1, OT)  1 week  -SG        Progress/Outcomes (Coordination Goal 1, OT)  goal ongoing  -SG          User Key  (r) = Recorded By, (t) = Taken By, (c) = Cosigned By    Initials Name Effective Dates     Erlinda Gerard OTR 06/08/18 -          Occupational Therapy Education                 Title: PT OT SLP Therapies (In Progress)     Topic: Occupational Therapy (In Progress)     Point: ADL training (In Progress)     Description:   Instruct learner(s) on proper safety adaptation and remediation techniques during self care or transfers.   Instruct in proper use of assistive devices.              Learning Progress Summary           Patient Acceptance, E,TB,D, NR by  at 1/14/2020 1500    Comment:  role of OT and POC, safety for adl. LUE safety   Significant Other Acceptance, E,TB,D, NR by  at 1/14/2020 1500    Comment:  role of OT and POC, safety for adl. LUE safety                               User Key     Initials Effective Dates Name Provider Type Discipline     06/08/18 -  Erlinda Gerard OTR Occupational Therapist OT                  OT Recommendation and Plan  Planned Therapy Interventions (OT Eval): BADL retraining, neuromuscular control/coordination retraining, transfer/mobility retraining  Therapy Frequency (OT Eval): 5 times/wk  Plan of Care  Review  Plan of Care Reviewed With: patient  Plan of Care Reviewed With: patient  Outcome Summary: Pt presents to OT with decreased LUE coordination and strength which limits safety and independence for adls. Pt and family educated with safety for LUE. Pt will continue to benefit from OT to assist with LUE function and safety for adls    Outcome Measures     Row Name 01/14/20 1502             How much help from another is currently needed...    Putting on and taking off regular lower body clothing?  2  -SG      Bathing (including washing, rinsing, and drying)  2  -SG      Toileting (which includes using toilet bed pan or urinal)  3  -SG      Putting on and taking off regular upper body clothing  3  -SG      Taking care of personal grooming (such as brushing teeth)  3  -SG      Eating meals  3  -SG      AM-PAC 6 Clicks Score (OT)  16  -SG         Modified Dundy Scale    Modified Dundy Scale  4 - Moderately severe disability.  Unable to walk without assistance, and unable to attend to own bodily needs without assistance.  -SG         Functional Assessment    Outcome Measure Options  AM-PAC 6 Clicks Daily Activity (OT)  -SG        User Key  (r) = Recorded By, (t) = Taken By, (c) = Cosigned By    Initials Name Provider Type    Erlinda Rubio OTR Occupational Therapist          Time Calculation:   Time Calculation- OT     Row Name 01/14/20 1503             Time Calculation- OT    OT Start Time  0940  -      OT Stop Time  0956  -      OT Time Calculation (min)  16 min  -      Total Timed Code Minutes- OT  8 minute(s)  -SG      OT Received On  01/14/20  -      OT Goal Re-Cert Due Date  01/21/20  -        User Key  (r) = Recorded By, (t) = Taken By, (c) = Cosigned By    Initials Name Provider Type    Erlinda Rubio OTR Occupational Therapist        Therapy Charges for Today     Code Description Service Date Service Provider Modifiers Qty    96631585512 HC OT EVAL LOW COMPLEXITY 2 1/14/2020 Moustapha  POLA Coffey GO 1    [id number]  OT THER PROC EA 15 MIN 1/14/2020 Erlnida Gerard OTR GO 1               POLA Rdz  1/14/2020

## 2020-01-14 NOTE — PLAN OF CARE
New admit from ER, arrived to unit at 1853. Awake, alert, oriented x4. VSS, hypertensive on admit. Night RN Jorge made aware.

## 2020-01-14 NOTE — PROGRESS NOTES
Pharmacy Consult: Warfarin Dosing/ Monitoring    Francisco Sequeira is a 84 y.o. male, estimated creatinine clearance is 48 mL/min (A) (by C-G formula based on SCr of 1.38 mg/dL (H)). weighing 82.2 kg (181 lb 3.5 oz).     has a past medical history of Abnormal electrocardiogram, Allergic rhinitis, Aortic valve insufficiency, Ascending aortic aneurysm (CMS/HCC), Atrial fibrillation (CMS/HCC), Bacteremia, Calcific aortic stenosis of bicuspid valve, Cardiac arrest (CMS/HCC), Cardiomyopathy (CMS/HCC), Contact dermatitis due to poison ivy, Diabetes mellitus (CMS/HCC), Elbow fracture, Esophageal reflux, GERD (gastroesophageal reflux disease), Head injury, Hyperglycemia, Hyperlipidemia, Hypertension, Kidney carcinoma (CMS/HCC), Leg swelling, Localized swelling of both lower legs, Nasal congestion, Nasal drainage, Nonischemic cardiomyopathy (CMS/HCC), Palpitations, Pedal edema, Pleural effusion on left, Renal insufficiency syndrome, Renal oncocytoma, Seasonal allergic reaction, Sinusitis, Syncope, Type 2 diabetes mellitus (CMS/HCC), Vision changes, and Visual field defect.    Social History     Tobacco Use   • Smoking status: Former Smoker   • Smokeless tobacco: Former User   • Tobacco comment: caffeine use   Substance Use Topics   • Alcohol use: Yes     Comment: occasional   • Drug use: No       Results from last 7 days   Lab Units 01/14/20  0516 01/13/20  0747   INR  1.99* 2.21*   HEMOGLOBIN g/dL 15.6 14.8   HEMATOCRIT % 47.5 45.2   PLATELETS 10*3/mm3 98* 109*     Results from last 7 days   Lab Units 01/14/20  0516 01/13/20  1932 01/13/20  0747   SODIUM mmol/L 138 140 143   POTASSIUM mmol/L 4.9 4.9 4.4   CHLORIDE mmol/L 106 103 107   CO2 mmol/L 20.2* 22.4 25.2   BUN mg/dL 26* 26* 29*   CREATININE mg/dL 1.38* 1.63* 2.04*   CALCIUM mg/dL 9.0 9.0 8.9   BILIRUBIN mg/dL  --   --  0.5   ALK PHOS U/L  --   --  80   ALT (SGPT) U/L  --   --  20   AST (SGOT) U/L  --   --  18   GLUCOSE mg/dL 127* 104* 68     Anticoagulation history:  Per last [facility] from [facility] on 12/30, patient's home warfarin regimen is 5 mg qMWF and 7.5 mg TThSSun.     Hospital Anticoagulation:  Consulting provider: LIZA Zayas  Start date: 1/13/20  Indication: Aortic mechanical valve  Target INR: 2.5-3.5  Expected duration: Lifelong  Bridge Therapy: No                Date 1/13 1/14           INR 2.21 1.99           Warfarin dose 7.5mg 7.5mg             Potential drug interactions: ASA    Relevant nutrition status: reg cardiac diet    Other:     Education complete?/ Date: no    Assessment/Plan:  Dose will give 7.5mg today  Monitor INR daily  Follow up daily    Pharmacy will continue to follow until discharge or discontinuation of warfarin.   Rob Marino PharmD

## 2020-01-14 NOTE — CONSULTS
"Diabetes Education  Assessment/Teaching    Patient Name:  Francisco Sequeira  YOB: 1935  MRN: [id number]  Admit Date:  1/13/2020      Assessment Date:  1/14/2020    Most Recent Value   General Information    Height  182 cm (71.65\")   Height Method  Stated   Weight  82.2 kg (181 lb 3.5 oz)   Weight Method  Bed scale   Pregnancy Assessment   Diabetes History   What type of diabetes do you have?  Type 2 [diagnosed in 2006]   Length of Diabetes Diagnosis  10 + years   Current DM knowledge  good   Have you had diabetes education/teaching in the past?  no   Do you test your blood sugar at home?  yes   Frequency of checks  2-3 times a day   Who performs the test?  self   Typical readings  's   Have you had low blood sugar? (<70mg/dl)  yes   How often do you have low blood sugar?  rare   Have you had high blood sugar? (>140mg/dl)  yes   How often do you have high blood sugar?  occasionally   How would you rate your diabetes control?  good   How often do you check your feet?  daily   Do you have any diabetes complications?  circulation problems   Education Preferences   Nutrition Information   Assessment Topics   Healthy Eating - Assessment  Competent   Being Active - Assessment  Competent   Taking Medication - Assessment  Competent   Problem Solving - Assessment  Needs education   Reducing Risk - Assessment  Needs education   Healthy Coping - Assessment  Needs education   Monitoring - Assessment  Competent   DM Goals            Most Recent Value   DM Education Needs   Meter  Has own   Frequency of Testing  3 times a day   Medication  Insulin [VGO]   Problem Solving  Hypoglycemia, Hyperglycemia, Sick days, Signs, Symptoms, Treatment   Reducing Risks  Cardiovascular, Neuropathy, A1C testing   Healthy Eating  RD consult   Physical Activity  Walking   Physical Activity Frequency  Discussed exercise importance   Healthy Coping  Appropriate   Discharge Plan  Home   Motivation  Engaged   Teaching Method  " Explanation, Discussion, Handouts   Patient Response  Verbalized understanding            Other Comments:  Patient states that he has been on a v-go pump for about 2 years and is very happy with this device.  He does not currently have it on and wife states that she can bring one from home if he needs it put back on.  Thank you for this referral.  Please reconsult if needed.        Electronically signed by:  Yvette Quintero RN  01/14/20 1:25 PM

## 2020-01-14 NOTE — CONSULTS
Neurology Note    Patient:  Francisco Sequeira    YOB: 1935    REFERRING PHYSICIAN:  Fernando Bernal MD    CHIEF COMPLAINT:    Left sided weakness    HISTORY OF PRESENT ILLNESS:   The patient is a 84 y.o. male with metal AVR, AF, CMT, DM, HTN, HLP, on warfarin, developed weakness, worse on the left, difficulty with gait on 1/2 around 0830, presented to ED yday at 0900. Left sided ataxia noted in ED, /83, in AF, R76, CTH negative, INR 2.2 . Still weak and clumsy on the left today, got up with a walker, normally walks w/o difficulty, reports some swallowing difficulty. INR 1.99 this am. Got ASA.    Past Medical History:  Past Medical History:   Diagnosis Date   • Abnormal electrocardiogram    • Allergic rhinitis    • Aortic valve insufficiency    • Ascending aortic aneurysm (CMS/HCC)    • Atrial fibrillation (CMS/HCC)    • Bacteremia    • Calcific aortic stenosis of bicuspid valve    • Cardiac arrest (CMS/HCC)    • Cardiomyopathy (CMS/HCC)    • Contact dermatitis due to poison ivy    • Diabetes mellitus (CMS/HCC)    • Elbow fracture    • Esophageal reflux    • GERD (gastroesophageal reflux disease)    • Head injury    • Hyperglycemia    • Hyperlipidemia    • Hypertension    • Kidney carcinoma (CMS/HCC)    • Leg swelling    • Localized swelling of both lower legs    • Nasal congestion    • Nasal drainage    • Nonischemic cardiomyopathy (CMS/HCC)    • Palpitations    • Pedal edema    • Pleural effusion on left    • Renal insufficiency syndrome    • Renal oncocytoma    • Seasonal allergic reaction    • Sinusitis    • Syncope    • Type 2 diabetes mellitus (CMS/HCC)     uncontrolled   • Vision changes    • Visual field defect        Past Surgical History:  Past Surgical History:   Procedure Laterality Date   • AORTIC VALVE REPAIR/REPLACEMENT     • ASCENDING AORTIC ANEURYSM REPAIR W/ MECHANICAL AORTIC VALVE REPLACEMENT     • NEPHRECTOMY     • OTHER SURGICAL HISTORY      elbow surgery   • PROSTATE  SURGERY     • THORACENTESIS Left     diagnostic       Social History:   Social History     Socioeconomic History   • Marital status:      Spouse name: Not on file   • Number of children: Not on file   • Years of education: Not on file   • Highest education level: Not on file   Tobacco Use   • Smoking status: Former Smoker   • Smokeless tobacco: Former User   • Tobacco comment: caffeine use   Substance and Sexual Activity   • Alcohol use: Yes     Comment: occasional   • Drug use: No   • Sexual activity: Defer        Family History:   Family History   Problem Relation Age of Onset   • Cancer Mother         colon   • Cancer Brother         colon       Medications Prior to Admission:    Prior to Admission medications    Medication Sig Start Date End Date Taking? Authorizing Provider   atorvastatin (LIPITOR) 20 MG tablet TAKE ONE TABLET BY MOUTH DAILY 1/13/20  Yes Rubin Hinkle APRN   digoxin (LANOXIN) 125 MCG tablet TAKE ONE TABLET BY MOUTH DAILY 1/2/20  Yes Griffin Fried MD   diphenhydrAMINE (BENADRYL) 25 MG tablet Take 100 mg by mouth 2 (Two) Times a Day. 8/10/12  Yes ProviderTristen MD   furosemide (LASIX) 20 MG tablet TAKE ONE TABLET BY MOUTH DAILY  Patient taking differently: Every Other Day. LAST DOSE 1/12/20 11/25/19  Yes Griffin Fried MD   glucose blood (ACCU-CHEK DARLENE PLUS) test strip USE 3 TO 4 TIMES DAILY 12/16/19  Yes Rubin Hinkle APRN   glucose blood test strip Accu-Chek Darlene Plus In Vitro Strip; Patient Sig: Accu-Chek Darlene Plus In Vitro Strip CHECK BLOOD SUGAR THREE TIMES A DAY TO FOUR TIMES A DAY; 100; 5; 14-Sep-2015; Active 9/14/15  Yes Tristen Ortega MD   guaiFENesin (MUCINEX) 600 MG 12 hr tablet Take  by mouth every 12 (twelve) hours. 8/10/12  Yes Tristen Ortega MD   insulin degludec (TRESIBA FLEXTOUCH) 100 UNIT/ML solution pen-injector injection Inject 60 Units under the skin into the appropriate area as directed Daily.  Patient taking differently: Inject  58 Units under the skin into the appropriate area as directed Every Night. 11/15/19  Yes Rubin Hinkle APRN   Insulin Disposable Pump (V-GO 40) kit USE AS DIRECTED THREE TIMES A DAY 5/23/19  Yes Rubin Hinkle APRN   insulin lispro (HUMALOG) 100 UNIT/ML injection USE INSULIN AS DIRECTED IN V-GO PUMP 5/24/19  Yes Rubin Hinkle APRN   insulin lispro (HUMALOG) 100 UNIT/ML injection USE INSULIN AS DIRECTED IN V-GO PUMP 7/18/19  Yes Rubin Hinkle APRN   Insulin Pen Needle (PEN NEEDLES) 32G X 4 MM misc USE AS DIRECTED 9/27/19  Yes Rubin Hinkle APRN   magnesium oxide (MAG-OX) 400 MG tablet Take 400 mg by mouth 2 (Two) Times a Day.   Yes Tristen Ortega MD   metoprolol succinate XL (TOPROL-XL) 25 MG 24 hr tablet TAKE ONE TABLET BY MOUTH DAILY 4/22/19  Yes Griffin Fried MD   Omega-3 Fatty Acids (FISH OIL) 1000 MG capsule capsule Take 4,000 mg by mouth Daily With Breakfast.   Yes Tristen Ortega MD   SODIUM BICARBONATE PO Take 500 mg by mouth 2 (Two) Times a Day. 8/10/12  Yes Tristen Ortega MD   warfarin (COUMADIN) 5 MG tablet TAKE ON TABLET BY MOUTH ON Monday, Wednesday AND Friday AND 1 AND 1/2 TABLETS ON ALL OTHER DAYS OR AS DIRECTED 10/11/19  Yes Griffin Fried MD   ACCU-CHEK FASTCLIX LANCETS misc TEST BLOOD SUGAR 3 TO 4 TIMES A DAY 12/11/18   Ruibn Hinkle APRN   Blood Glucose Monitoring Suppl (ACCU-CHEK IFEANYI) device Test blood sugar tid 2/28/19   Rubin Hinkle APRN   clobetasol (TEMOVATE) 0.05 % external solution Apply  topically to the appropriate area as directed 2 (Two) Times a Day. 11/15/19   Rubin Hinkle APRN   SAFETY LANCETS 21G misc Test blood sugar twice a day 2/1/19   Rubin Hinkle APRN       Allergies:  Other; Penicillins; and Percocet  [oxycodone-acetaminophen]      Review of system  Review of Systems   Musculoskeletal: Positive for gait problem.   Neurological: Positive for weakness.       Vitals:    01/14/20 1328   BP: 163/72   Pulse: 56   Resp: 16   Temp: 97.4 °F  (36.3 °C)   SpO2: 98%       Physical exam  Physical Exam   Constitutional: He is oriented to person, place, and time. He appears well-developed and well-nourished.   HENT:   Head: Normocephalic and atraumatic.   Eyes: Pupils are equal, round, and reactive to light. EOM are normal.   Cardiovascular: Normal rate and regular rhythm.   Pulmonary/Chest: Effort normal.   Neurological: He is alert and oriented to person, place, and time. No cranial nerve deficit.   No facial droop, VFF, speech clear, LUE and LLE drift, DTRs slow on the left, positive left Babinski. DTRs hypoactive, decreased pedal vibration sense.   Psychiatric: He has a normal mood and affect. His behavior is normal. Thought content normal.         Lab Results   Component Value Date    WBC 6.19 2020    HGB 15.6 2020    HCT 47.5 2020    MCV 85.7 2020    PLT 98 (L) 2020     Lab Results   Component Value Date    GLUCOSE 127 (H) 2020    BUN 26 (H) 2020    CREATININE 1.38 (H) 2020    EGFRIFNONA 49 (L) 2020    EGFRIFAFRI 49 (L) 11/15/2019    BCR 18.8 2020    CO2 20.2 (L) 2020    CALCIUM 9.0 2020    PROTENTOTREF 7.0 11/15/2019    ALBUMIN 3.90 2020    LABIL2 1.7 11/15/2019    AST 18 2020    ALT 20 2020   Protime-INR   Order: [id number]   Status:  Final result   Visible to patient:  No (Not Released) Next appt:  2020 at 08:30 AM in Pharmacy ([facility])   Component  Ref Range & Units 1d ago 2wk ago   Protime  11.7 - 14.2 Seconds 24.2High   36.8High  R   INR  0.90 - 1.10 2.21High   3.1High  R             Echocardiogram   Order# [id number]   Reading physician: Jose Farrar III, MD Ordering physician: Val Montgomery APRN Study date: 20   Patient Information     Patient Name  Francisco Sequeira MRN  [id number] Sex  Male  (Age)  1935 (84 y.o.)   Admission Information     Admission Date/Time Discharge Date/Time Room/Bed   20  0653  S523/1  "  Sedation Narrator Report     Sedation Narrator Report   Interpretation Summary     · Estimated EF = 60%.  · Left ventricular systolic function is normal.  · Left ventricular wall thickness is consistent with mild concentric hypertrophy.  · Right ventricular cavity is moderately dilated.  · Mildly reduced right ventricular systolic function noted.  · Right atrial cavity size is moderately dilated.  · Left atrial cavity size is mildly dilated.  · Mild to moderate tricuspid valve regurgitation is present.  · Calculated right ventricular systolic pressure from tricuspid regurgitation is 44.8 mmHg.  · There is a mechanical valve present. The aortic valve peak and mean gradients are within defined limits.      Patient Hx Of Height, Weight, and Vitals     Height Weight BSA (Calculated - sq m) BMI (kg/m2) Pulse BP   182 cm (71.65\") 81 kg (178 lb 9.2 oz) 2.02 sq meters 24.5 70 167/79   Reason for Exam     Other Reasons - Stroke; Lightheadedness, Presyncope, or Syncope; Syncope - Stroke   Cardiac History     Diagnosis Date Comment Source   Diabetes mellitus (CMS/[facility])   Provider   Hyperlipidemia   Provider   Hypertension   Provider   Type 2 diabetes mellitus (CMS/[facility])  uncontrolled Provider   Study Description     A two-dimensional transthoracic echocardiogram with complete color flow and Doppler was performed. The study is technically good for diagnosis.Verbal consent was obtained from the patient to use saline contrast in order to optimize the study. A total of 30 mL of agitated saline was administered to assess for cardiac shunting.   No adverse reaction to contrast was noted.   Echocardiogram Findings     Left Ventricle Left ventricular systolic function is normal. Calculated EF = 60.0%. Estimated EF was in agreement with the calculated EF. Estimated EF = 60%. Normal left ventricular cavity size noted. All left ventricular wall segments contract normally. Left ventricular wall thickness is consistent with mild concentric " hypertrophy. Left ventricular diastolic function was indeterminate.   Right Ventricle Normal right ventricular wall thickness and septal motion noted. Right ventricular cavity is moderately dilated. Mildly reduced right ventricular systolic function noted.   Left Atrium Normal left atrial volume noted. Left atrial cavity size is mildly dilated. The interatrial septum does not appear to be redundant. No lipomatous hypertrophy of the interatrial septum present. No evidence of a patent foramen ovale. No evidence of an atrial septal defect present. . Saline test results are negative.   Right Atrium Right atrial cavity size is moderately dilated.   Aortic Valve There is a prosthetic aortic valve. No aortic valve regurgitation is present. No aortic valve stenosis is present. There is a mechanical valve present. The aortic valve peak and mean gradients are within defined limits. The prosthetic valve is normal.   Mitral Valve The mitral valve is abnormal in structure. Mild MAC is present. No mitral valve regurgitation is present. No significant mitral valve stenosis is present.   Tricuspid Valve The tricuspid valve is normal. No evidence of tricuspid valve stenosis is present. Mild to moderate tricuspid valve regurgitation is present. Calculated right ventricular systolic pressure from tricuspid regurgitation is 44.8 mmHg.   Pulmonic Valve The pulmonic valve is structurally normal. There is no significant pulmonic valve stenosis present. There is no pulmonic valve regurgitation present.   Greater Vessels No dilation of the aortic root is present.   Pericardium The pericardium is normal. There is no evidence of pericardial effusion.     ECG 12 Lead   Order: [id number]   Status:  Final result   Visible to patient:  No (Not Released) Next appt:  01/27/2020 at 08:30 AM in Pharmacy ([facility])      Narrative & Impression     HEART RATE= 69  bpm  RR Interval= 868  ms  GA Interval=   ms  P Horizontal Axis=   deg  P  Front Axis=   deg  QRSD Interval= 99  ms  QT Interval= 394  ms  QRS Axis= -9  deg  T Wave Axis= 59  deg  - ABNORMAL ECG -  Atrial fibrillation  Ventricular premature complex  No change from prior tracing  Electronically Signed By: Derian Marie ([facility]) 13-Jan-2020 20:28:35  Date and Time of Study: 2020-01-13 07:58:22      Specimen Collected: 01/13/20 07:58 Last Resulted: 01/13/20 20:28               Radiological Studies:    Ct Head Without Contrast    Result Date: 1/13/2020  CT HEAD  HISTORY:Lower extremity weakness, left greater than right  TECHNIQUE: CT scan of the head was obtained with 3 mm axial images without intravenous contrast.  Radiation dose reduction techniques were utilized, including automated exposure control and exposure modulation based on body size.  COMPARISON:CT head 01/13/2016  FINDINGS: There is no finding of acute infarct, hemorrhage, contusion or abnormal extra-axial collection. No hydrocephalus is present. Focal subcutaneous soft tissue thickening over the right parietal calvarium, possibly representing a resolving scalp contusion.      1.  No findings of acute intracranial abnormality. 2.  Other findings as above.   This report was finalized on 1/13/2020 8:36 AM by Dr. Antonio Hart M.D.      Mri Brain Without Contrast    Result Date: 1/14/2020  MRI BRAIN WO CONTRAST-  CLINICAL HISTORY: Confusion. History of prostate cancer and kidney cancer. Lower extremity weakness.  TECHNIQUE: Multiple axial T1, T2, gradient echo and diffusion images were acquired. Sagittal T1-weighted images were also obtained.  COMPARISON: CT scan of the head dated 01/13/2020  FINDINGS: There is mild diffuse cortical atrophy. There are several small nodular foci of T2 hyperintensity in the periventricular white matter of both cerebral hemispheres, most of which show no corresponding restricted diffusion, and are consistent with sequela of mild small vessel chronic ischemic change. However, there is a cluster of  approximately 3 small foci of restricted diffusion in the periventricular white matter of the posterior aspect of the right frontal lobe that are consistent with acute white matter infarcts. These measure approximately 1.7 x 0.7 cm in aggregate size. There is no significant associated mass effect. No cortical infarcts are identified. There are no masses. Normal flow voids are observed in the major vascular structures. The paranasal sinuses appear well aerated.      Mild cortical atrophy and evidence of mild small vessel chronic ischemic change as described. A small cluster of acute white matter infarcts are present within the periventricular white matter of the right frontal lobe posteriorly. Otherwise unremarkable MRI of the brain.  This report was finalized on 1/14/2020 11:26 AM by Dr. Francisco Zee M.D.      Xr Chest 1 View    Result Date: 1/13/2020  XR CHEST 1 VW-  01/13/2020  HISTORY: Weakness. Shortness of breath.  Heart size is mildly enlarged. Lungs appear free of acute infiltrates. Sternotomy wires are seen. Bony structures appear unremarkable.      Mild cardiomegaly. 2. Lungs appear clear.  This report was finalized on 1/13/2020 8:02 AM by Dr. Onel Capps M.D.          During this visit the following were done:  Labs Reviewed [x]    Labs Ordered []    Radiology Reports Reviewed [x]    Radiology Ordered []    EKG, echo, and/or stress test reviewed [x]    EEG results reviewed  []    EEG reviewed and interpreted per myself   []    Discussed case with neurointerventionalist or neuroradiologist []    Referring Provider Records Reviewed []    ER Records Reviewed []    Hospital Records Reviewed []    History Obtained From Family []    Radiological images view and Interpreted per myself [x]    Case Discussed with referring provider []     Decision to obtain and request outside records  []        Assessment and Plan     Scattered RMCA territory stroke, suspect cardioembolic source 22 AF, AVR. Not a TPA or  intervention candidate on time.   - Observation on telemetry.   - Tx Lovenox bridge to INR 2.5-3.5.   - Cardiology consult.   - Carotid duplex.   - CRP, TSH, T4, b12, folate.   - Continue ASA, statin.   - ST, PT, OT.    Thanks,          Electronically signed by Bartolo Nevarez MD on 1/14/2020 at 2:53 PM

## 2020-01-14 NOTE — PROGRESS NOTES
[facility]     LOS: 0 days     Name: Francisco Sequeira  Age: 84 y.o.  Sex: male  :  1935  MRN: [id number]         Primary Care Physician: Rubin Hinkle APRN    Diet Regular; Cardiac, Consistent Carbohydrate    Subjective   Patient is seen by bedside, patient feels much better today.    Objective   Temp:  [97.2 °F (36.2 °C)-97.8 °F (36.6 °C)] 97.8 °F (36.6 °C)  Heart Rate:  [68-70] 68  Resp:  [16] 16  BP: (145-183)/() 154/85  SpO2:  [96 %-99 %] 97 %  on   ;   Device (Oxygen Therapy): room air  Body mass index is 24.82 kg/m².    Physical Exam   Constitutional: He is oriented to person, place, and time. He appears well-developed and well-nourished. No distress.   HENT:   Head: Normocephalic and atraumatic.   Nose: Nose normal.   Mouth/Throat: Oropharynx is clear and moist.   Eyes: Conjunctivae are normal. Right eye exhibits no discharge. Left eye exhibits no discharge.   Neck: Normal range of motion. Neck supple.   Cardiovascular: Normal heart sounds. An irregularly irregular rhythm present.  Pulmonary/Chest: Effort normal and breath sounds normal. No respiratory distress.   Abdominal: Soft. Bowel sounds are normal. He exhibits no distension. There is no tenderness.   Musculoskeletal: Normal range of motion. He exhibits no edema or tenderness.   Neurological: He is alert and oriented to person, place, and time. He exhibits normal muscle tone. Coordination normal.   Skin: Skin is warm and dry. He is not diaphoretic. No erythema.   Psychiatric: He has a normal mood and affect. His behavior is normal.   Nursing note and vitals reviewed.      Reviewed medications and new clinical results        Results from last 7 days   Lab Units 20  0516 20  0747   WBC 10*3/mm3 6.19 4.65   HEMOGLOBIN g/dL 15.6 14.8   PLATELETS 10*3/mm3 98* 109*     Results from last 7 days   Lab Units 20  0516 20  0747   SODIUM mmol/L 138 140  143   POTASSIUM mmol/L 4.9 4.9 4.4   CHLORIDE mmol/L 106 103 107   CO2 mmol/L 20.2* 22.4 25.2   BUN mg/dL 26* 26* 29*   CREATININE mg/dL 1.38* 1.63* 2.04*   CALCIUM mg/dL 9.0 9.0 8.9   GLUCOSE mg/dL 127* 104* 68     Lab Results   Component Value Date    ANIONGAP 11.8 01/14/2020     Glucose   Date/Time Value Ref Range Status   01/14/2020 0621 123 70 - 130 mg/dL Final   01/13/2020 2123 219 (H) 70 - 130 mg/dL Final   01/13/2020 1713 46 (C) 70 - 130 mg/dL Final   01/13/2020 1701 52 (L) 70 - 130 mg/dL Final   01/13/2020 1318 199 (H) 70 - 130 mg/dL Final   01/13/2020 0742 69 (L) 70 - 130 mg/dL Final     Hemoglobin A1C   Date Value Ref Range Status   01/14/2020 7.40 (H) 4.80 - 5.60 % Final     Estimated Creatinine Clearance: 48 mL/min (A) (by C-G formula based on SCr of 1.38 mg/dL (H)).    Assessment/Plan   Active Hospital Problems    Diagnosis  POA   • BYRON (acute kidney injury) (CMS/[facility]) [N17.9]  Yes   • Stroke-like symptom [R29.90]  Yes   • Kidney carcinoma (CMS/[facility]) [C64.9]  Yes   • CKD (chronic kidney disease) stage 3, GFR 30-59 ml/min (CMS/[facility]) [N18.3]  Yes   • Hx of aortic valve replacement, mechanical [Z95.2] [Z95.2]  Not Applicable   • Uncontrolled type 2 diabetes mellitus (CMS/[facility]) [E11.65]  Yes   • Hyperlipidemia [E78.5]  Yes   • Atrial fibrillation (CMS/[facility]) [I48.91]  Yes   • Hypertension [I10]  Yes      Resolved Hospital Problems   No resolved problems to display.     84 y.o. male     Assessment and plan:  1.  Stroke-like symptoms.  Patient complains of weakness in lower extremities.  Neurology on board.  Follow recommendations.  MRI of the brain today, it showed a small cluster of acute white matter infarcts are present within the periventricular white matter of  the right frontal lobe posteriorly.  Continue aspirin and statin therapy. Lipid Panel and hemoglobin A1c reviewed.  2.  Acute on chronic kidney injury.  Renal function is much improved today.  3.  Atrial fibrillation.  Patient is currently rate  controlled.  Continue Coumadin.  Continue to monitor INR.  4.  Diabetes mellitus.  Mild hypoglycemia was noted, continue Accu-Cheks and use sliding scale insulin coverage.  5.  Hyperlipidemia.  Continue statin therapy.  6.  Further plans based on hospital course, we would continue to monitor.    Fernando Bernal MD   01/14/20  10:24 AM

## 2020-01-14 NOTE — THERAPY EVALUATION
Patient Name: Francisco Sequeira  : 1935    MRN: [id number]                              Today's Date: 2020       Admit Date: 2020    Visit Dx:     ICD-10-CM ICD-9-CM   1. Stroke-like symptom R29.90 781.99   2. H/O mechanical aortic valve replacement Z95.2 V43.3   3. Current use of long term anticoagulation Z79.01 V58.61   4. Subtherapeutic international normalized ratio (INR) R79.1 790.92   5. Acute kidney injury (CMS/HCC) N17.9 584.9     Patient Active Problem List   Diagnosis   • Atrial fibrillation (CMS/HCC)   • Hypertension   • Atopic rhinitis   • Uncontrolled type 2 diabetes mellitus (CMS/HCC)   • Gastroesophageal reflux disease   • Hyperlipidemia   • Renal insufficiency   • Low testosterone   • Special screening for malignant neoplasm of prostate   • Hx of aortic valve replacement, mechanical [Z95.2]   • Screening for iron deficiency anemia   • Stroke-like symptom   • Kidney carcinoma (CMS/HCC)   • CKD (chronic kidney disease) stage 3, GFR 30-59 ml/min (CMS/HCC)     Past Medical History:   Diagnosis Date   • Abnormal electrocardiogram    • Allergic rhinitis    • Aortic valve insufficiency    • Ascending aortic aneurysm (CMS/HCC)    • Atrial fibrillation (CMS/HCC)    • Bacteremia    • Calcific aortic stenosis of bicuspid valve    • Cardiac arrest (CMS/HCC)    • Cardiomyopathy (CMS/HCC)    • Contact dermatitis due to poison ivy    • Diabetes mellitus (CMS/HCC)    • Elbow fracture    • Esophageal reflux    • GERD (gastroesophageal reflux disease)    • Head injury    • Hyperglycemia    • Hyperlipidemia    • Hypertension    • Kidney carcinoma (CMS/HCC)    • Leg swelling    • Localized swelling of both lower legs    • Nasal congestion    • Nasal drainage    • Nonischemic cardiomyopathy (CMS/HCC)    • Palpitations    • Pedal edema    • Pleural effusion on left    • Renal insufficiency syndrome    • Renal oncocytoma    • Seasonal allergic reaction    • Sinusitis    • Syncope    • Type 2 diabetes  mellitus (CMS/HCC)     uncontrolled   • Vision changes    • Visual field defect      Past Surgical History:   Procedure Laterality Date   • AORTIC VALVE REPAIR/REPLACEMENT     • ASCENDING AORTIC ANEURYSM REPAIR W/ MECHANICAL AORTIC VALVE REPLACEMENT     • NEPHRECTOMY     • OTHER SURGICAL HISTORY      elbow surgery   • PROSTATE SURGERY     • THORACENTESIS Left     diagnostic     General Information     Row Name 01/14/20 0912          PT Evaluation Time/Intention    Document Type  evaluation  -MW     Mode of Treatment  physical therapy  -MW     Row Name 01/14/20 0912          General Information    Patient Profile Reviewed?  yes  -MW     Prior Level of Function  independent:;gait;all household mobility  -MW     Existing Precautions/Restrictions  fall  -MW     Barriers to Rehab  none identified  -MW     Row Name 01/14/20 0912          Home Main Entrance    Number of Stairs, Main Entrance  two  -MW     Stair Railings, Main Entrance  none  -MW     Row Name 01/14/20 0912          Stairs Within Home, Primary    Number of Stairs, Within Home, Primary  two  -MW     Row Name 01/14/20 0912          Cognitive Assessment/Intervention- PT/OT    Orientation Status (Cognition)  oriented x 3  -MW     Row Name 01/14/20 0912          Safety Issues, Functional Mobility    Safety Issues Affecting Function (Mobility)  ability to follow commands;awareness of need for assistance  -     Impairments Affecting Function (Mobility)  balance;coordination;endurance/activity tolerance;strength  -MW     Comment, Safety Issues/Impairments (Mobility)  Gait belt used for safety.  -MW       User Key  (r) = Recorded By, (t) = Taken By, (c) = Cosigned By    Initials Name Provider Type    MW Tierra Latham PT Physical Therapist        Mobility     Row Name 01/14/20 0913          Bed Mobility Assessment/Treatment    Comment (Bed Mobility)  NT, up in chair  -     Row Name 01/14/20 0913          Sit-Stand Transfer    Sit-Stand [place] (Transfers)   minimum assist (75% patient effort);contact guard;verbal cues  -MW     Assistive Device (Sit-Stand Transfers)  walker, front-wheeled  -MW     Row Name 01/14/20 0913          Gait/Stairs Assessment/Training    Gait/Stairs Assessment/Training  gait/ambulation independence;gait/ambulation assistive device  -MW     [place] Level (Gait)  moderate assist (50% patient effort);nonverbal cues (demo/gesture);verbal cues  -MW     Assistive Device (Gait)  walker, front-wheeled  -MW     Distance in Feet (Gait)  70  -MW     Pattern (Gait)  step-to  -MW     Deviations/Abnormal Patterns (Gait)  left sided deviations;ataxic;base of support, narrow;gait speed decreased  -MW     Bilateral Gait Deviations  --  -MW     Left Sided Gait Deviations  knee hyperextension;heel strike decreased  -MW     Comment (Gait/Stairs)  Verbal and tactile cues for positioning of RW and to move slowly. Requires assistance with holding down RW on left side due to decreased LUE strength.  -MW       User Key  (r) = Recorded By, (t) = Taken By, (c) = Cosigned By    Initials Name Provider Type    MW Tierra Latham, PT Physical Therapist        Obj/Interventions     Row Name 01/14/20 0916          General ROM    GENERAL ROM COMMENTS  WFL BLE  -MW     Row Name 01/14/20 0916          MMT (Manual Muscle Testing)    General MMT Comments  RLE WFL, LLE 3+/5  -MW     Row Name 01/14/20 0916          Therapeutic Exercise    Comment (Therapeutic Exercise)  Therex BLE x 10 reps each, seated: LAQ, hip flexion, & ankle pumps  -MW     Row Name 01/14/20 0916          Static Standing Balance    Level of [place] (Supported Standing, Static Balance)  contact guard assist;minimal assist, 75% patient effort  -MW     Assistive Device Utilized (Supported Standing, Static Balance)  walker, rolling  -MW     Row Name 01/14/20 0916          Dynamic Standing Balance    Level of [place], Reaches Outside Midline (Standing, Dynamic Balance)  moderate assist, 50 to 74% patient  effort  -MW     Assistive Device Utilized (Supported Standing, Dynamic Balance)  walker, rolling  -MW     Row Name 01/14/20 0916          Sensory Assessment/Intervention    Sensory General Assessment  no sensation deficits identified  -MW       User Key  (r) = Recorded By, (t) = Taken By, (c) = Cosigned By    Initials Name Provider Type    Tierra Sandhu PT Physical Therapist        Goals/Plan     Row Name 01/14/20 0930          Bed Mobility Goal 1 (PT)    Activity/Assistive Device (Bed Mobility Goal 1, PT)  bed mobility activities, all  -MW     [place] Level/Cues Needed (Bed Mobility Goal 1, PT)  supervision required  -MW     Time Frame (Bed Mobility Goal 1, PT)  3 days  -MW     Row Name 01/14/20 0930          Transfer Goal 1 (PT)    Activity/Assistive Device (Transfer Goal 1, PT)  transfers, all;walker, rolling  -MW     [place] Level/Cues Needed (Transfer Goal 1, PT)  contact guard assist;supervision required  -MW     Time Frame (Transfer Goal 1, PT)  3 days  -MW     Row Name 01/14/20 0930          Gait Training Goal 1 (PT)    Activity/Assistive Device (Gait Training Goal 1, PT)  gait (walking locomotion);assistive device use;walker, rolling  -MW     [place] Level (Gait Training Goal 1, PT)  minimum assist (75% or more patient effort);contact guard assist  -MW     Distance (Gait Goal 1, PT)  150  -MW     Time Frame (Gait Training Goal 1, PT)  3 days  -MW     Row Name 01/14/20 0930          Stairs Goal 1 (PT)    Activity/Assistive Device (Stairs Goal 1, PT)  stairs, all skills  -MW     [place] Level/Cues Needed (Stairs Goal 1, PT)  minimum assist (75% or more patient effort)  -MW     Number of Stairs (Stairs Goal 1, PT)  2  -MW     Time Frame (Stairs Goal 1, PT)  3 days  -MW       User Key  (r) = Recorded By, (t) = Taken By, (c) = Cosigned By    Initials Name Provider Type    Tierra Sandhu PT Physical Therapist        Clinical Impression     Row Name 01/14/20 0924          Pain  Assessment    Additional Documentation  Pain Scale: Numbers Pre/Post-Treatment (Group)  -MW     Row Name 01/14/20 0924          Pain Scale: Numbers Pre/Post-Treatment    Pain Scale: Numbers, Pretreatment  0/10 - no pain  -MW     Pain Scale: Numbers, Post-Treatment  0/10 - no pain  -MW     Row Name 01/14/20 0924          Plan of Care Review    Plan of Care Reviewed With  patient;spouse  -     Outcome Summary  Patient is an 85 yo male with h/o a-fib and mechanical aortic valve replacement admitted on 1/13/2020 with generalized weakness, greater on the the left side; fall while in shower. Prior to admission he was living with his spouse in a home with 2 IVONNE, independent with mobility at baseline. He currently presents with left side weakness, impaired balance and coordination, and impaired functional mobility. Today he required CG/Min A with STS and Mod A with gait using RW x 70 feet. Skilled PT indicated to address functional deficits and maximize level of independence prior to discharge.   -     Row Name 01/14/20 0924          Physical Therapy Clinical Impression    Patient/Family Goals Statement (PT Clinical Impression)  To return home.  -MW     Criteria for Skilled Interventions Met (PT Clinical Impression)  yes;treatment indicated  -MW     Rehab Potential (PT Clinical Summary)  good, to achieve stated therapy goals  -MW     Predicted Duration of Therapy (PT)  1 week  -MW     Row Name 01/14/20 0924          Vital Signs    Pre Systolic BP Rehab  161  -MW     Pre Treatment Diastolic BP  89  -MW     Pretreatment Heart Rate (beats/min)  75  -MW     Posttreatment Heart Rate (beats/min)  65  -MW     Pre SpO2 (%)  96  -MW     O2 Delivery Pre Treatment  room air  -MW     O2 Delivery Intra Treatment  room air  -MW     Post SpO2 (%)  95  -MW     O2 Delivery Post Treatment  room air  -MW     Pre Patient Position  Sitting  -MW     Intra Patient Position  Standing  -MW     Post Patient Position  Sitting  -MW     Row Name  01/14/20 0924          Positioning and Restraints    Pre-Treatment Position  sitting in chair/recliner  -MW     Post Treatment Position  chair  -MW     In Chair  notified nsg;reclined;sitting;call light within reach;encouraged to call for assist;with family/caregiver  -       User Key  (r) = Recorded By, (t) = Taken By, (c) = Cosigned By    Initials Name Provider Type    Tierra Sandhu PT Physical Therapist        Outcome Measures     Row Name 01/14/20 0932          How much help from another person do you currently need...    Turning from your back to your side while in flat bed without using bedrails?  4  -MW     Moving from lying on back to sitting on the side of a flat bed without bedrails?  3  -MW     Moving to and from a bed to a chair (including a wheelchair)?  3  -MW     Standing up from a chair using your arms (e.g., wheelchair, bedside chair)?  3  -MW     Climbing 3-5 steps with a railing?  2  -MW     To walk in hospital room?  2  -MW     AM-PAC 6 Clicks Score (PT)  17  -     Row Name 01/14/20 0932          Modified [place] Scale    Modified [place] Scale  4 - Moderately severe disability.  Unable to walk without assistance, and unable to attend to own bodily needs without assistance.  -     Row Name 01/14/20 0932          Functional Assessment    Outcome Measure Options  AM-PAC 6 Clicks Basic Mobility (PT);Modified [place]  -       User Key  (r) = Recorded By, (t) = Taken By, (c) = Cosigned By    Initials Name Provider Type    Tierra Sandhu PT Physical Therapist        Physical Therapy Education                 Title: PT OT SLP Therapies (In Progress)     Topic: Physical Therapy (In Progress)     Point: Mobility training (In Progress)     Description:   Instruct learner(s) on safety and technique for assisting patient out of bed, chair or wheelchair.  Instruct in the proper use of assistive devices, such as walker, crutches, cane or brace.              Patient Friendly Description:   It's  important to get you on your feet again, but we need to do so in a way that is safe for you. Falling has serious consequences, and your personal safety is the most important thing of all.        When it's time to get out of bed, one of us or a family member will sit next to you on the bed to give you support.     If your doctor or nurse tells you to use a walker, crutches, a cane, or a brace, be sure you use it every time you get out of bed, even if you think you don't need it.    Learning Progress Summary           Patient Acceptance, E,D, NR by MW at 1/14/2020 0932    Comment:  Safety, request assitance for transfers & gait with RW, PT POC   Significant Other Acceptance, E,D, NR by MW at 1/14/2020 0932    Comment:  Safety, request assitance for transfers & gait with RW, PT POC                   Point: Home exercise program (In Progress)     Description:   Instruct learner(s) on appropriate technique for monitoring, assisting and/or progressing patient with therapeutic exercises and activities.              Learning Progress Summary           Patient Acceptance, E,D, NR by MW at 1/14/2020 0932    Comment:  Safety, request assitance for transfers & gait with RW, PT POC   Significant Other Acceptance, E,D, NR by MW at 1/14/2020 0932    Comment:  Safety, request assitance for transfers & gait with RW, PT POC                   Point: Body mechanics (In Progress)     Description:   Instruct learner(s) on proper positioning and spine alignment for patient and/or caregiver during mobility tasks and/or exercises.              Learning Progress Summary           Patient Acceptance, E,D, NR by MW at 1/14/2020 0932    Comment:  Safety, request assitance for transfers & gait with RW, PT POC   Significant Other Acceptance, E,D, NR by MW at 1/14/2020 0932    Comment:  Safety, request assitance for transfers & gait with RW, PT POC                   Point: Precautions (In Progress)     Description:   Instruct learner(s) on  prescribed precautions during mobility and gait tasks              Learning Progress Summary           Patient Acceptance, E,D, NR by  at 1/14/2020 0932    Comment:  Safety, request assitance for transfers & gait with RW, PT POC   Significant Other Acceptance, E,D, NR by  at 1/14/2020 0932    Comment:  Safety, request assitance for transfers & gait with RW, PT POC                               User Key     Initials Effective Dates Name Provider Type Discipline     09/17/19 -  Tierra Latham, PT Physical Therapist PT              PT Recommendation and Plan  Planned Therapy Interventions (PT Eval): balance training, bed mobility training, gait training, home exercise program, neuromuscular re-education, patient/family education, stair training, strengthening, transfer training  Outcome Summary/Treatment Plan (PT)  Anticipated Equipment Needs at Discharge (PT): front wheeled walker  Anticipated Discharge Disposition (PT): anticipate therapy at next level of care  Plan of Care Reviewed With: patient, spouse  Outcome Summary: Patient is an 85 yo male with h/o a-fib and mechanical aortic valve replacement admitted on 1/13/2020 with generalized weakness, greater on the the left side; fall while in shower. Prior to admission he was living with his spouse in a home with 2 IOVNNE, independent with mobility at baseline. He currently presents with left side weakness, impaired balance and coordination, and impaired functional mobility. Today he required CG/Min A with STS and Mod A with gait using RW x 70 feet. Skilled PT indicated to address functional deficits and maximize level of independence prior to discharge.      Time Calculation:   PT Charges     Row Name 01/14/20 0934             Time Calculation    Start Time  0851  -MW      Stop Time  0911  -MW      Time Calculation (min)  20 min  -MW      PT Received On  01/14/20  -MW      PT - Next Appointment  01/15/20  -MW      PT Goal Re-Cert Due Date  01/21/20  -MW          Time Calculation- PT    Total Timed Code Minutes- PT  16 minute(s)  -JODY        User Key  (r) = Recorded By, (t) = Taken By, (c) = Cosigned By    Initials Name Provider Type    Tierra Sandhu, CICI Physical Therapist        Therapy Charges for Today     Code Description Service Date Service Provider Modifiers Qty    95535666463  PT EVAL MOD COMPLEXITY 2 1/14/2020 Tierra Latham, PT GP 1    28521171145 HC PT THER PROC EA 15 MIN 1/14/2020 Tierra Latham, PT GP 1          PT G-Codes  Outcome Measure Options: AM-PAC 6 Clicks Basic Mobility (PT), Modified [place]  AM-PAC 6 Clicks Score (PT): 17  Modified [place] Scale: 4 - Moderately severe disability.  Unable to walk without assistance, and unable to attend to own bodily needs without assistance.    Tierra Latham, CICI  1/14/2020

## 2020-01-14 NOTE — DISCHARGE PLACEMENT REQUEST
"aLura Xie (84 y.o. Male)     Date of Birth Social Security Number Address Home Phone MRN    1935  [address] [address] [phone] [id number]    Nondenominational Marital Status          Confucianist        Admission Date Admission Type Admitting Provider Attending Provider Department, Room/Bed    1/13/20 Emergency Fernando Bernal MD Kapila, Abhishek, MD [address], [address]    Discharge Date Discharge Disposition Discharge Destination                       Attending Provider:  Fernando Bernal MD    Allergies:  Other, Penicillins, Percocet  [Oxycodone-acetaminophen]    Isolation:  None   Infection:  None   Code Status:  CPR    Ht:  182 cm (71.65\")   Wt:  82.2 kg (181 lb 3.5 oz)    Admission Cmt:  None   Principal Problem:  None                Active Insurance as of 1/13/2020     Primary Coverage     Payor Plan Insurance Group Employer/Plan Group    MEDICARE MEDICARE A & B      Payor Plan Address Payor Plan Phone Number Payor Plan Fax Number Effective Dates    [address] [phone]  11/1/2002 - None Entered    [facility] [id number]       Subscriber Name Subscriber Birth Date Member ID       LAURA XIE 1935 [id number]           Secondary Coverage     Payor Plan Insurance Group Employer/Plan Group    ANTHEM BLUE CROSS ANTHEM BLUE CROSS BLUE SHIELD PPO [id number]     Payor Plan Address Payor Plan Phone Number Payor Plan Fax Number Effective Dates    [address] [phone]  1/1/2018 - None Entered    [facility] [id number]       Subscriber Name Subscriber Birth Date Member ID       PATRICIA XIE 7/11/1957 [id number]                 Emergency Contacts      (Rel.) Home Phone Work Phone Mobile Phone    Patricia Xie (Spouse) [phone] -- [phone]    Bruce Silva (Son) -- -- [phone]              "

## 2020-01-14 NOTE — SIGNIFICANT NOTE
01/14/20 1014   Rehab Treatment   Discipline speech language pathologist   Reason Treatment Not Performed unavailable for treatment  (Patient currently off floor at MRI. ST to follow for bedside swallow eval as warranted.)

## 2020-01-14 NOTE — PLAN OF CARE
Problem: Patient Care Overview  Goal: Plan of Care Review  Outcome: Ongoing (interventions implemented as appropriate)  Flowsheets  Taken 1/14/2020 0926 by Kenzie Amin RN  Plan of Care Reviewed With: patient;spouse  Taken 1/14/2020 0924 by Tierra Latham PT  Outcome Summary: Patient is an 83 yo male with h/o a-fib and mechanical aortic valve replacement admitted on 1/13/2020 with generalized weakness, greater on the the left side; fall while in shower. Prior to admission he was living with his spouse in a home with 2 IVONNE, independent with mobility at baseline. He currently presents with left side weakness, impaired balance and coordination, and impaired functional mobility. Today he required CG/Min A with STS and Mod A with gait using RW x 70 feet. Skilled PT indicated to address functional deficits and maximize level of independence prior to discharge.

## 2020-01-14 NOTE — PLAN OF CARE
Problem: Patient Care Overview  Goal: Plan of Care Review  Outcome: Ongoing (interventions implemented as appropriate)  Flowsheets (Taken 1/14/2020 7351)  Progress: no change  Plan of Care Reviewed With: patient  Outcome Summary: Patient symptoms resolved prior to arrival on unit, only deficites noted patient reports are from a prior stroke.

## 2020-01-14 NOTE — PLAN OF CARE
Problem: Patient Care Overview  Goal: Plan of Care Review  Flowsheets (Taken 1/14/2020 1500)  Plan of Care Reviewed With: patient  Outcome Summary: Pt presents to OT with decreased LUE coordination and strength which limits safety and independence for adls. Pt and family educated with safety for LUE. Pt will continue to benefit from OT to assist with LUE function and safety for adls

## 2020-01-15 ENCOUNTER — APPOINTMENT (OUTPATIENT)
Dept: CARDIOLOGY | Facility: HOSPITAL | Age: 85
End: 2020-01-15

## 2020-01-15 LAB
ANION GAP SERPL CALCULATED.3IONS-SCNC: 10.8 MMOL/L (ref 5–15)
BASOPHILS # BLD AUTO: 0.02 10*3/MM3 (ref 0–0.2)
BASOPHILS NFR BLD AUTO: 0.4 % (ref 0–1.5)
BH CV XLRA MEAS LEFT DIST CCA EDV: -10.7 CM/SEC
BH CV XLRA MEAS LEFT DIST CCA PSV: -105.6 CM/SEC
BH CV XLRA MEAS LEFT DIST ICA EDV: -13.7 CM/SEC
BH CV XLRA MEAS LEFT DIST ICA PSV: -68.7 CM/SEC
BH CV XLRA MEAS LEFT ICA/CCA RATIO: 0.66
BH CV XLRA MEAS LEFT MID ICA EDV: -15.3 CM/SEC
BH CV XLRA MEAS LEFT MID ICA PSV: -70.3 CM/SEC
BH CV XLRA MEAS LEFT PROX CCA EDV: -4.8 CM/SEC
BH CV XLRA MEAS LEFT PROX CCA PSV: -121.1 CM/SEC
BH CV XLRA MEAS LEFT PROX ECA PSV: -118.1 CM/SEC
BH CV XLRA MEAS LEFT PROX ICA EDV: -11.8 CM/SEC
BH CV XLRA MEAS LEFT PROX ICA PSV: -50.9 CM/SEC
BH CV XLRA MEAS LEFT PROX SCLA PSV: 133.6 CM/SEC
BH CV XLRA MEAS LEFT VERTEBRAL A PSV: -48.5 CM/SEC
BH CV XLRA MEAS RIGHT DIST CCA EDV: -16.8 CM/SEC
BH CV XLRA MEAS RIGHT DIST CCA PSV: -105 CM/SEC
BH CV XLRA MEAS RIGHT DIST ICA EDV: -18.9 CM/SEC
BH CV XLRA MEAS RIGHT DIST ICA PSV: -94.6 CM/SEC
BH CV XLRA MEAS RIGHT ICA/CCA RATIO: 0.89
BH CV XLRA MEAS RIGHT MID ICA EDV: -20.3 CM/SEC
BH CV XLRA MEAS RIGHT MID ICA PSV: -85.5 CM/SEC
BH CV XLRA MEAS RIGHT PROX CCA EDV: -5 CM/SEC
BH CV XLRA MEAS RIGHT PROX CCA PSV: -100 CM/SEC
BH CV XLRA MEAS RIGHT PROX ECA PSV: -110 CM/SEC
BH CV XLRA MEAS RIGHT PROX ICA EDV: -15.4 CM/SEC
BH CV XLRA MEAS RIGHT PROX ICA PSV: -79.9 CM/SEC
BH CV XLRA MEAS RIGHT PROX SCLA PSV: 117.6 CM/SEC
BH CV XLRA MEAS RIGHT VERTEBRAL A EDV: 14.8 CM/SEC
BH CV XLRA MEAS RIGHT VERTEBRAL A PSV: 53.8 CM/SEC
BUN BLD-MCNC: 25 MG/DL (ref 8–23)
BUN/CREAT SERPL: 17.6 (ref 7–25)
CALCIUM SPEC-SCNC: 9 MG/DL (ref 8.6–10.5)
CHLORIDE SERPL-SCNC: 104 MMOL/L (ref 98–107)
CO2 SERPL-SCNC: 22.2 MMOL/L (ref 22–29)
CREAT BLD-MCNC: 1.42 MG/DL (ref 0.76–1.27)
DEPRECATED RDW RBC AUTO: 42.8 FL (ref 37–54)
EOSINOPHIL # BLD AUTO: 0.13 10*3/MM3 (ref 0–0.4)
EOSINOPHIL NFR BLD AUTO: 2.3 % (ref 0.3–6.2)
ERYTHROCYTE [DISTWIDTH] IN BLOOD BY AUTOMATED COUNT: 13.7 % (ref 12.3–15.4)
FOLATE SERPL-MCNC: 9.32 NG/ML (ref 4.78–24.2)
GFR SERPL CREATININE-BSD FRML MDRD: 48 ML/MIN/1.73
GLUCOSE BLD-MCNC: 131 MG/DL (ref 65–99)
GLUCOSE BLDC GLUCOMTR-MCNC: 120 MG/DL (ref 70–130)
GLUCOSE BLDC GLUCOMTR-MCNC: 200 MG/DL (ref 70–130)
GLUCOSE BLDC GLUCOMTR-MCNC: 222 MG/DL (ref 70–130)
GLUCOSE BLDC GLUCOMTR-MCNC: 238 MG/DL (ref 70–130)
HCT VFR BLD AUTO: 46 % (ref 37.5–51)
HGB BLD-MCNC: 15.1 G/DL (ref 13–17.7)
IMM GRANULOCYTES # BLD AUTO: 0.02 10*3/MM3 (ref 0–0.05)
IMM GRANULOCYTES NFR BLD AUTO: 0.4 % (ref 0–0.5)
INR PPP: 1.8 (ref 0.9–1.1)
LYMPHOCYTES # BLD AUTO: 1.16 10*3/MM3 (ref 0.7–3.1)
LYMPHOCYTES NFR BLD AUTO: 20.9 % (ref 19.6–45.3)
MCH RBC QN AUTO: 28.2 PG (ref 26.6–33)
MCHC RBC AUTO-ENTMCNC: 32.8 G/DL (ref 31.5–35.7)
MCV RBC AUTO: 86 FL (ref 79–97)
MONOCYTES # BLD AUTO: 0.38 10*3/MM3 (ref 0.1–0.9)
MONOCYTES NFR BLD AUTO: 6.9 % (ref 5–12)
NEUTROPHILS # BLD AUTO: 3.83 10*3/MM3 (ref 1.7–7)
NEUTROPHILS NFR BLD AUTO: 69.1 % (ref 42.7–76)
NRBC BLD AUTO-RTO: 0 /100 WBC (ref 0–0.2)
PLATELET # BLD AUTO: 101 10*3/MM3 (ref 140–450)
PMV BLD AUTO: 12.1 FL (ref 6–12)
POTASSIUM BLD-SCNC: 4.8 MMOL/L (ref 3.5–5.2)
PROTHROMBIN TIME: 20.6 SECONDS (ref 11.7–14.2)
RBC # BLD AUTO: 5.35 10*6/MM3 (ref 4.14–5.8)
SODIUM BLD-SCNC: 137 MMOL/L (ref 136–145)
TSH SERPL DL<=0.05 MIU/L-ACNC: 3.25 UIU/ML (ref 0.27–4.2)
VIT B12 BLD-MCNC: 381 PG/ML (ref 211–946)
WBC NRBC COR # BLD: 5.54 10*3/MM3 (ref 3.4–10.8)

## 2020-01-15 PROCEDURE — 93880 EXTRACRANIAL BILAT STUDY: CPT

## 2020-01-15 PROCEDURE — 85025 COMPLETE CBC W/AUTO DIFF WBC: CPT | Performed by: INTERNAL MEDICINE

## 2020-01-15 PROCEDURE — 99232 SBSQ HOSP IP/OBS MODERATE 35: CPT | Performed by: INTERNAL MEDICINE

## 2020-01-15 PROCEDURE — 82962 GLUCOSE BLOOD TEST: CPT

## 2020-01-15 PROCEDURE — 36415 COLL VENOUS BLD VENIPUNCTURE: CPT | Performed by: NURSE PRACTITIONER

## 2020-01-15 PROCEDURE — 84443 ASSAY THYROID STIM HORMONE: CPT | Performed by: NURSE PRACTITIONER

## 2020-01-15 PROCEDURE — 92523 SPEECH SOUND LANG COMPREHEN: CPT

## 2020-01-15 PROCEDURE — 25010000002 ENOXAPARIN PER 10 MG: Performed by: PSYCHIATRY & NEUROLOGY

## 2020-01-15 PROCEDURE — 99233 SBSQ HOSP IP/OBS HIGH 50: CPT | Performed by: NURSE PRACTITIONER

## 2020-01-15 PROCEDURE — 97112 NEUROMUSCULAR REEDUCATION: CPT

## 2020-01-15 PROCEDURE — 63710000001 INSULIN LISPRO (HUMAN) PER 5 UNITS: Performed by: NURSE PRACTITIONER

## 2020-01-15 PROCEDURE — 85610 PROTHROMBIN TIME: CPT | Performed by: NURSE PRACTITIONER

## 2020-01-15 PROCEDURE — 97116 GAIT TRAINING THERAPY: CPT

## 2020-01-15 PROCEDURE — 80048 BASIC METABOLIC PNL TOTAL CA: CPT | Performed by: NURSE PRACTITIONER

## 2020-01-15 RX ADMIN — INSULIN LISPRO 3 UNITS: 100 INJECTION, SOLUTION INTRAVENOUS; SUBCUTANEOUS at 22:26

## 2020-01-15 RX ADMIN — Medication 400 MG: at 09:42

## 2020-01-15 RX ADMIN — ATORVASTATIN CALCIUM 20 MG: 20 TABLET, FILM COATED ORAL at 09:42

## 2020-01-15 RX ADMIN — SODIUM CHLORIDE, PRESERVATIVE FREE 10 ML: 5 INJECTION INTRAVENOUS at 09:43

## 2020-01-15 RX ADMIN — INSULIN LISPRO 3 UNITS: 100 INJECTION, SOLUTION INTRAVENOUS; SUBCUTANEOUS at 13:04

## 2020-01-15 RX ADMIN — SODIUM CHLORIDE, PRESERVATIVE FREE 10 ML: 5 INJECTION INTRAVENOUS at 22:29

## 2020-01-15 RX ADMIN — SODIUM BICARBONATE 1950 MG: 650 TABLET ORAL at 09:42

## 2020-01-15 RX ADMIN — SODIUM BICARBONATE 1950 MG: 650 TABLET ORAL at 22:26

## 2020-01-15 RX ADMIN — GUAIFENESIN 600 MG: 600 TABLET, EXTENDED RELEASE ORAL at 09:42

## 2020-01-15 RX ADMIN — INSULIN LISPRO 3 UNITS: 100 INJECTION, SOLUTION INTRAVENOUS; SUBCUTANEOUS at 17:54

## 2020-01-15 RX ADMIN — GUAIFENESIN 600 MG: 600 TABLET, EXTENDED RELEASE ORAL at 22:26

## 2020-01-15 RX ADMIN — METOPROLOL SUCCINATE 25 MG: 25 TABLET, EXTENDED RELEASE ORAL at 09:42

## 2020-01-15 RX ADMIN — DIGOXIN 125 MCG: 125 TABLET ORAL at 09:42

## 2020-01-15 RX ADMIN — ASPIRIN 325 MG: 325 TABLET ORAL at 09:42

## 2020-01-15 RX ADMIN — ENOXAPARIN SODIUM 80 MG: 80 INJECTION SUBCUTANEOUS at 17:54

## 2020-01-15 RX ADMIN — ENOXAPARIN SODIUM 80 MG: 80 INJECTION SUBCUTANEOUS at 05:55

## 2020-01-15 NOTE — THERAPY TREATMENT NOTE
Acute Care - Occupational Therapy Progress Note  [facility]     Patient Name: Francisco Sequeira  : 1935  MRN: [id number]  Today's Date: 1/15/2020             Admit Date: 2020       ICD-10-CM ICD-9-CM   1. Stroke-like symptom R29.90 781.99   2. H/O mechanical aortic valve replacement Z95.2 V43.3   3. Current use of long term anticoagulation Z79.01 V58.61   4. Subtherapeutic international normalized ratio (INR) R79.1 790.92   5. Acute kidney injury (CMS/HCC) N17.9 584.9     Patient Active Problem List   Diagnosis   • Atrial fibrillation (CMS/HCC)   • Hypertension   • Atopic rhinitis   • Uncontrolled type 2 diabetes mellitus (CMS/HCC)   • Gastroesophageal reflux disease   • Hyperlipidemia   • Renal insufficiency   • Low testosterone   • Special screening for malignant neoplasm of prostate   • Hx of aortic valve replacement, mechanical [Z95.2]   • Screening for iron deficiency anemia   • Stroke-like symptom   • Kidney carcinoma (CMS/HCC)   • CKD (chronic kidney disease) stage 3, GFR 30-59 ml/min (CMS/HCC)   • BYRON (acute kidney injury) (CMS/HCC)   • Acute cerebral infarction (CMS/HCC)     Past Medical History:   Diagnosis Date   • Abnormal electrocardiogram    • Allergic rhinitis    • Aortic valve insufficiency    • Ascending aortic aneurysm (CMS/HCC)    • Atrial fibrillation (CMS/HCC)    • Bacteremia    • Calcific aortic stenosis of bicuspid valve    • Cardiac arrest (CMS/HCC)    • Cardiomyopathy (CMS/HCC)    • Contact dermatitis due to poison ivy    • Diabetes mellitus (CMS/HCC)    • Elbow fracture    • Esophageal reflux    • GERD (gastroesophageal reflux disease)    • Head injury    • Hyperglycemia    • Hyperlipidemia    • Hypertension    • Kidney carcinoma (CMS/HCC)    • Leg swelling    • Localized swelling of both lower legs    • Nasal congestion    • Nasal drainage    • Nonischemic cardiomyopathy (CMS/HCC)    • Palpitations    • Pedal edema    • Pleural effusion on left    • Renal insufficiency  syndrome    • Renal oncocytoma    • Seasonal allergic reaction    • Sinusitis    • Syncope    • Type 2 diabetes mellitus (CMS/HCC)     uncontrolled   • Vision changes    • Visual field defect      Past Surgical History:   Procedure Laterality Date   • AORTIC VALVE REPAIR/REPLACEMENT     • ASCENDING AORTIC ANEURYSM REPAIR W/ MECHANICAL AORTIC VALVE REPLACEMENT     • NEPHRECTOMY     • OTHER SURGICAL HISTORY      elbow surgery   • PROSTATE SURGERY     • THORACENTESIS Left     diagnostic       Therapy Treatment    Rehabilitation Treatment Summary     Row Name 01/15/20 1323             Treatment Time/Intention    Discipline  occupational therapist  -SG      Document Type  therapy note (daily note)  -SG      Subjective Information  no complaints  -SG      Mode of Treatment  individual therapy;occupational therapy  -SG      Patient Effort  good  -SG      Existing Precautions/Restrictions  fall  -SG      Recorded by [SG] Erlinda Gerard, [facility] 01/15/20 1328      Row Name 01/15/20 1323             Cognitive Assessment/Intervention- PT/OT    Orientation Status (Cognition)  oriented x 3  -SG      Follows Commands (Cognition)  follows one step commands  -SG      Recorded by [SG] Erlinda Gerard, [facility] 01/15/20 1328      Row Name 01/15/20 1323             Transfer Assessment/Treatment    Transfer Assessment/Treatment  sit-stand transfer;stand-sit transfer  -SG      Recorded by [SG] Erlinda Gerard, [facility] 01/15/20 1328      Row Name 01/15/20 1323             Sit-Stand Transfer    Sit-Stand [place] (Transfers)  minimum assist (75% patient effort);verbal cues;nonverbal cues (demo/gesture)  -SG      Assistive Device (Sit-Stand Transfers)  walker, front-wheeled  -SG      Recorded by [SG] Erlinda Gerard, [facility] 01/15/20 1328      Row Name 01/15/20 1323             Stand-Sit Transfer    Stand-Sit [place] (Transfers)  minimum assist (75% patient effort);verbal cues;nonverbal cues (demo/gesture)  -SG      Assistive Device  (Stand-Sit Transfers)  walker, front-wheeled  -SG      Recorded by [SG] Erlinda Gerard OTR 01/15/20 1328      Row Name 01/15/20 1323             BADL Safety/Performance    Impairments, BADL Safety/Performance  balance;strength;coordination  -SG      Recorded by [SG] Erlinda Gerard OTR 01/15/20 1328      Row Name 01/15/20 1323             Therapeutic Exercise    Upper Extremity Range of Motion (Therapeutic Exercise)  shoulder flexion/extension, left;elbow flexion/extension, left;forearm supination/pronation, left;wrist flexion/extension, left  -SG      Exercise Type (Therapeutic Exercise)  AROM (active range of motion)  -SG      Position (Therapeutic Exercise)  seated  -SG      Expected Outcome (Therapeutic Exercise)  improve neuromuscular control;improve motor control;improve performance, BADLs  -SG      Comment (Therapeutic Exercise)  educated pt and family with exercise to perform on own  -SG      Recorded by [SG] Erlinda Gerard OTR 01/15/20 1328      Row Name 01/15/20 1323             Static Standing Balance    Level of [place] (Supported Standing, Static Balance)  contact guard assist  -SG      Assistive Device Utilized (Supported Standing, Static Balance)  walker, rolling  -SG      Recorded by [SG] Erlinda Gerard OTR 01/15/20 1328      Row Name 01/15/20 1323             Dynamic Standing Balance    Level of [place], Reaches Outside Midline (Standing, Dynamic Balance)  minimal assist, 75% patient effort  -SG      Assistive Device Utilized (Supported Standing, Dynamic Balance)  walker, rolling  -SG      Recorded by [SG] Erlinda Gerard OTR 01/15/20 1328      Row Name 01/15/20 1323             Positioning and Restraints    Pre-Treatment Position  sitting in chair/recliner  -SG      Post Treatment Position  chair  -SG      In Chair  sitting;call light within reach;encouraged to call for assist;with family/caregiver  -SG      Recorded by [SG] Erlinda Gerard OTR 01/15/20 1328      Row Name  01/15/20 1323             Pain Scale: Numbers Pre/Post-Treatment    Pain Scale: Numbers, Pretreatment  0/10 - no pain  -SG      Pain Scale: Numbers, Post-Treatment  0/10 - no pain  -SG      Recorded by [SG] Erlinda Gerard, OTR 01/15/20 1328      Row Name 01/15/20 1323             Plan of Care Review    Plan of Care Reviewed With  patient;spouse  -SG      Recorded by [SG] Erlinda Gerard, OTR 01/15/20 1328        User Key  (r) = Recorded By, (t) = Taken By, (c) = Cosigned By    Initials Name Effective Dates Discipline    SG Erlinda Gerard, OTR 06/08/18 -  OT                 OT Recommendation and Plan  Planned Therapy Interventions (OT Eval): BADL retraining, neuromuscular control/coordination retraining, transfer/mobility retraining  Therapy Frequency (OT Eval): 5 times/wk  Plan of Care Review  Plan of Care Reviewed With: patient, spouse  Plan of Care Reviewed With: patient, spouse  Outcome Summary: Pt and family agree to OT.  Pt continues with decreased strength and coordination LUE which limits safety for adls. Pt and family educated with exercise to perform on own.  Continue OT  Outcome Measures     Row Name 01/15/20 1330 01/14/20 1502          How much help from another is currently needed...    Putting on and taking off regular lower body clothing?  3  -SG  2  -SG     Bathing (including washing, rinsing, and drying)  3  -SG  2  -SG     Toileting (which includes using toilet bed pan or urinal)  3  -SG  3  -SG     Putting on and taking off regular upper body clothing  3  -SG  3  -SG     Taking care of personal grooming (such as brushing teeth)  3  -SG  3  -SG     Eating meals  3  -SG  3  -SG     AM-PAC 6 Clicks Score (OT)  18  -SG  16  -SG        Modified Edwige Scale    Modified Edwige Scale  --  4 - Moderately severe disability.  Unable to walk without assistance, and unable to attend to own bodily needs without assistance.  -SG        Functional Assessment    Outcome Measure Options  --  AM-PAC 6 Clicks  Daily Activity (OT)  -       User Key  (r) = Recorded By, (t) = Taken By, (c) = Cosigned By    Initials Name Provider Type    Erlinda Rubio OTR Occupational Therapist           Time Calculation:   Time Calculation- OT     Row Name 01/15/20 1331             Time Calculation- OT    OT Start Time  1300  -SG      OT Stop Time  1323  -SG      OT Time Calculation (min)  23 min  -SG      Total Timed Code Minutes- OT  23 minute(s)  -SG      OT Received On  01/15/20  -        User Key  (r) = Recorded By, (t) = Taken By, (c) = Cosigned By    Initials Name Provider Type    Erlinda Rubio OTR Occupational Therapist        Therapy Charges for Today     Code Description Service Date Service Provider Modifiers Qty    32719633761 HC OT EVAL LOW COMPLEXITY 2 1/14/2020 Erlinda Gerard OTR GO 1    55439059132 HC OT THER PROC EA 15 MIN 1/14/2020 Erlinda Gerard OTR GO 1    00339974845 HC OT NEUROMUSC RE EDUCATION EA 15 MIN 1/15/2020 Erlinda Gerard OTR GO 2               POLA Rdz  1/15/2020

## 2020-01-15 NOTE — CONSULTS
Cardiology History & Physical / Consultation      Patient Name: Francisco Sequeira  Age/Sex: 84 y.o. male  : 1935  MRN: [id number]    Date of Admission: 2020  Date of Encounter Visit: 01/15/20  Encounter Provider: Zahra Zuñiga RN  Referring Provider: Fernando Bernal MD  Place of Service: [facility] CARDIOLOGY  Patient Care Team:  Rubin Hinkle APRN as PCP - General (Family Medicine)  Rubin Hinkle APRN as PCP - Claims Attributed  [facility] as Pharmacist  [facility] as Pharmacist (Pharmacy)          Subjective:     Chief Complaint: weakness    Reason for consultation: embolic stroke, AVR    History of Present Illness:  Francisco Sequeira is a 84 y.o. male patient known to me with history of AF (Coumadin), aortic metal heart valve, HTN, hyperlipidemia, kidney cancer s/p nephrectomy, CKD3 and DM3. He presented to [facility] 20 with reports of generalized weakness and was admitted for stroke workup. Imagining performed noted scattered RMCA territory strokes, suspected for cardioembolic source, for which we have been consulted. An echocardiogram obtained noted normal LVSF with EF 60% and mild to moderate TR.     Echocardiogram 20  · Estimated EF = 60%.  · Left ventricular systolic function is normal.  · Left ventricular wall thickness is consistent with mild concentric hypertrophy.  · Right ventricular cavity is moderately dilated.  · Mildly reduced right ventricular systolic function noted.  · Right atrial cavity size is moderately dilated.  · Left atrial cavity size is mildly dilated.  · Mild to moderate tricuspid valve regurgitation is present.  · Calculated right ventricular systolic pressure from tricuspid regurgitation is 44.8 mmHg.  · There is a mechanical valve present. The aortic valve peak and mean gradients are within defined limits.    Previous Cardiac Testing:    Stress Test 17  · Left ventricular ejection fraction  is normal (Calculated EF = 65%).  · Myocardial perfusion imaging indicates a normal myocardial perfusion study with no evidence of ischemia.  · Impressions are consistent with a low risk study.    Echocardiogram 6/1/17  · Left ventricular systolic function is normal. Calculated EF = 53.1%. Estimated EF was in agreement with the calculated EF. Normal left ventricular cavity size and wall thickness noted. All left ventricular wall segments contract normally.  · Left ventricular diastolic function was unable to be assessed.  · Left atrial volume is mildly increased.  · Right atrial cavity size is mildly dilated.  · The inferior vena cava is dilated.  · There is a prosthetic aortic valve. There is a mechanical valve present. The prosthetic valve is normal.  · Mild-to-moderate mitral valve regurgitation is present.  · Moderate tricuspid valve regurgitation is present. Estimated right ventricular systolic pressure from tricuspid regurgitation is mildly elevated (35-45 mmHg).      Past Medical History:  Past Medical History:   Diagnosis Date   • Abnormal electrocardiogram    • Allergic rhinitis    • Aortic valve insufficiency    • Ascending aortic aneurysm (CMS/HCC)    • Atrial fibrillation (CMS/HCC)    • Bacteremia    • Calcific aortic stenosis of bicuspid valve    • Cardiac arrest (CMS/HCC)    • Cardiomyopathy (CMS/HCC)    • Contact dermatitis due to poison ivy    • Diabetes mellitus (CMS/HCC)    • Elbow fracture    • Esophageal reflux    • GERD (gastroesophageal reflux disease)    • Head injury    • Hyperglycemia    • Hyperlipidemia    • Hypertension    • Kidney carcinoma (CMS/HCC)    • Leg swelling    • Localized swelling of both lower legs    • Nasal congestion    • Nasal drainage    • Nonischemic cardiomyopathy (CMS/HCC)    • Palpitations    • Pedal edema    • Pleural effusion on left    • Renal insufficiency syndrome    • Renal oncocytoma    • Seasonal allergic reaction    • Sinusitis    • Syncope    • Type 2  diabetes mellitus (CMS/HCC)     uncontrolled   • Vision changes    • Visual field defect        Past Surgical History:   Procedure Laterality Date   • AORTIC VALVE REPAIR/REPLACEMENT     • ASCENDING AORTIC ANEURYSM REPAIR W/ MECHANICAL AORTIC VALVE REPLACEMENT     • NEPHRECTOMY     • OTHER SURGICAL HISTORY      elbow surgery   • PROSTATE SURGERY     • THORACENTESIS Left     diagnostic       Home Medications:   Medications Prior to Admission   Medication Sig Dispense Refill Last Dose   • atorvastatin (LIPITOR) 20 MG tablet TAKE ONE TABLET BY MOUTH DAILY 90 tablet 0 1/13/2020 at Unknown time   • digoxin (LANOXIN) 125 MCG tablet TAKE ONE TABLET BY MOUTH DAILY 90 tablet 0 1/13/2020 at Unknown time   • diphenhydrAMINE (BENADRYL) 25 MG tablet Take 100 mg by mouth 2 (Two) Times a Day.   1/13/2020 at Unknown time   • furosemide (LASIX) 20 MG tablet TAKE ONE TABLET BY MOUTH DAILY (Patient taking differently: Every Other Day. LAST DOSE 1/12/20) 90 tablet 1 1/12/2020 at 2100   • glucose blood (ACCU-CHEK IFEANYI PLUS) test strip USE 3 TO 4 TIMES DAILY 100 each 0 1/13/2020 at Unknown time   • glucose blood test strip Accu-Chek Ifeanyi Plus In Vitro Strip; Patient Sig: Accu-Chek Ifeanyi Plus In Vitro Strip CHECK BLOOD SUGAR THREE TIMES A DAY TO FOUR TIMES A DAY; 100; 5; 14-Sep-2015; Active   1/13/2020 at Unknown time   • guaiFENesin (MUCINEX) 600 MG 12 hr tablet Take  by mouth every 12 (twelve) hours.   1/13/2020 at Unknown time   • insulin degludec (TRESIBA FLEXTOUCH) 100 UNIT/ML solution pen-injector injection Inject 60 Units under the skin into the appropriate area as directed Daily. (Patient taking differently: Inject 58 Units under the skin into the appropriate area as directed Every Night.) 18 pen 3 1/12/2020 at Unknown time   • Insulin Disposable Pump (V-GO 40) kit USE AS DIRECTED THREE TIMES A DAY 60 each 4 1/13/2020 at Unknown time   • insulin lispro (HUMALOG) 100 UNIT/ML injection USE INSULIN AS DIRECTED IN V-GO PUMP 10 mL 11  1/13/2020 at Unknown time   • insulin lispro (HUMALOG) 100 UNIT/ML injection USE INSULIN AS DIRECTED IN V-GO PUMP 30 mL 11 1/13/2020 at Unknown time   • Insulin Pen Needle (PEN NEEDLES) 32G X 4 MM misc USE AS DIRECTED 200 each 5 1/12/2020 at Unknown time   • magnesium oxide (MAG-OX) 400 MG tablet Take 400 mg by mouth 2 (Two) Times a Day.   1/13/2020 at Unknown time   • metoprolol succinate XL (TOPROL-XL) 25 MG 24 hr tablet TAKE ONE TABLET BY MOUTH DAILY 90 tablet 3 1/13/2020 at Unknown time   • Omega-3 Fatty Acids (FISH OIL) 1000 MG capsule capsule Take 4,000 mg by mouth Daily With Breakfast.   1/13/2020 at Unknown time   • SODIUM BICARBONATE PO Take 500 mg by mouth 2 (Two) Times a Day.   1/13/2020 at Unknown time   • warfarin (COUMADIN) 5 MG tablet TAKE ON TABLET BY MOUTH ON Monday, Wednesday AND Friday AND 1 AND 1/2 TABLETS ON ALL OTHER DAYS OR AS DIRECTED 120 tablet 0 1/13/2020 at Unknown time   • ACCU-CHEK FASTCLIX LANCETS misc TEST BLOOD SUGAR 3 TO 4 TIMES A  each 3 Unknown at Unknown time   • Blood Glucose Monitoring Suppl (ACCU-CHEK IFEANYI) device Test blood sugar tid 1 each 0 Unknown at Unknown time   • clobetasol (TEMOVATE) 0.05 % external solution Apply  topically to the appropriate area as directed 2 (Two) Times a Day. 1 each 0 More than a month at Unknown time   • SAFETY LANCETS 21G misc Test blood sugar twice a day 200 each 3 Unknown at Unknown time       Allergies:  Allergies   Allergen Reactions   • Other      Grass, ragweed   • Penicillins    • Percocet  [Oxycodone-Acetaminophen]        Past Social History:  Social History     Socioeconomic History   • Marital status:      Spouse name: Not on file   • Number of children: Not on file   • Years of education: Not on file   • Highest education level: Not on file   Tobacco Use   • Smoking status: Former Smoker   • Smokeless tobacco: Former User   • Tobacco comment: caffeine use   Substance and Sexual Activity   • Alcohol use: Yes     Comment:  occasional   • Drug use: No   • Sexual activity: Defer       Past Family History: History reviewed. No pertinent family history.   Family History   Problem Relation Age of Onset   • Cancer Mother         colon   • Cancer Brother         colon       Review of Systems   All other systems reviewed and are negative.          Objective:     Objective:  Temp:  [97.4 °F (36.3 °C)-98.3 °F (36.8 °C)] 97.7 °F (36.5 °C)  Heart Rate:  [56-75] 71  Resp:  [16] 16  BP: (136-163)/(72-82) 136/75    Intake/Output Summary (Last 24 hours) at 1/15/2020 0822  Last data filed at 1/15/2020 0510  Gross per 24 hour   Intake 1460 ml   Output 900 ml   Net 560 ml     Body mass index is 23.27 kg/m².      01/13/20  1843 01/14/20  0500 01/15/20  0510   Weight: 81 kg (178 lb 9.2 oz) 82.2 kg (181 lb 3.5 oz) 77.1 kg (169 lb 14.4 oz)           Physical Exam:   Physical Exam   Constitutional: He is oriented to person, place, and time. He appears well-developed.   HENT:   Head: Normocephalic.   Eyes: Conjunctivae are normal.   Neck: Normal range of motion.   Cardiovascular: Normal rate, regular rhythm and normal heart sounds.   Pulmonary/Chest: Breath sounds normal.   Abdominal: Soft. Bowel sounds are normal.   Musculoskeletal: Normal range of motion. He exhibits no edema.   Neurological: He is alert and oriented to person, place, and time.   Skin: Skin is warm and dry.   Psychiatric: He has a normal mood and affect. His behavior is normal.   Vitals reviewed.  Artificial click noted    Labs:   Lab Review:     Results from last 7 days   Lab Units 01/15/20  0646 01/14/20  0516 01/13/20  1932 01/13/20  0747   SODIUM mmol/L 137 138 140 143   POTASSIUM mmol/L 4.8 4.9 4.9 4.4   CHLORIDE mmol/L 104 106 103 107   CO2 mmol/L 22.2 20.2* 22.4 25.2   BUN mg/dL 25* 26* 26* 29*   CREATININE mg/dL 1.42* 1.38* 1.63* 2.04*   GLUCOSE mg/dL 131* 127* 104* 68   CALCIUM mg/dL 9.0 9.0 9.0 8.9   AST (SGOT) U/L  --   --   --  18   ALT (SGPT) U/L  --   --   --  20     Results  from last 7 days   Lab Units 01/13/20  0747   TROPONIN T ng/mL <0.010     Results from last 7 days   Lab Units 01/15/20  0646   WBC 10*3/mm3 5.54   HEMOGLOBIN g/dL 15.1   HEMATOCRIT % 46.0   PLATELETS 10*3/mm3 101*     Results from last 7 days   Lab Units 01/15/20  0646 01/14/20  0516 01/13/20  0747   INR  1.80* 1.99* 2.21*     Results from last 7 days   Lab Units 01/14/20  0516   CHOLESTEROL mg/dL 154         Results from last 7 days   Lab Units 01/14/20  0516   CHOLESTEROL mg/dL 154   TRIGLYCERIDES mg/dL 161*   HDL CHOL mg/dL 34*   LDL CHOL mg/dL 88         Results from last 7 days   Lab Units 01/13/20  0747   DIGOXIN LVL ng/mL 1.20             PREVIOUS EKG 7/23/19          EKG 1/13/20            Assessment:       Atrial fibrillation (CMS/[facility])    Hypertension    Uncontrolled type 2 diabetes mellitus (CMS/[facility])    Hyperlipidemia    Hx of aortic valve replacement, mechanical [Z95.2]    Stroke-like symptom    Kidney carcinoma (CMS/[facility])    CKD (chronic kidney disease) stage 3, GFR 30-59 ml/min (CMS/HCC)    BYRON (acute kidney injury) (CMS/[facility])    Acute cerebral infarction (CMS/[facility])        Plan:   1.  Chronic atrial fibrillation.  2.  Metal aortic valve.  His INR on presentation was 2.21.  It had been stable as an outpatient they were checking it only once a month because he been so stable which would be appropriate.  In future however we may need to do it twice a month just because of the current event.  Although his INR is down a little from recommend dose it still was pretty good especially with a metal aortic valve.  3.  We will do a ABELARDO to assess his valve and look for other possible sources of emboli.  Risk and benefits of the ABELARDO were explained.  Due to scheduling issues it will be done tomorrow.  If nothing significant is found I think we are going to have to try to keep his INR at 3-3.5 in the future.      Thank you for allowing me to participate in the care of Francisco Sequeira. Feel free to contact me directly  with any further questions or concerns.    Zahra Zuñiga RN  [facility]  01/15/20  8:22 AM

## 2020-01-15 NOTE — PLAN OF CARE
Problem: Patient Care Overview  Goal: Plan of Care Review  Outcome: Ongoing (interventions implemented as appropriate)  Flowsheets (Taken 1/15/2020 1231)  Outcome Summary: Pt and family agree to OT.  Pt continues with decreased strength and coordination LUE which limits safety for adls. Pt and family educated with exercise to perform on own.  Continue OT

## 2020-01-15 NOTE — PROGRESS NOTES
Pharmacy Consult: Warfarin Dosing/ Monitoring    Francisco Sequeira is a 84 y.o. male, estimated creatinine clearance is 43.7 mL/min (A) (by C-G formula based on SCr of 1.42 mg/dL (H)). weighing 77.1 kg (169 lb 14.4 oz).     has a past medical history of Abnormal electrocardiogram, Allergic rhinitis, Aortic valve insufficiency, Ascending aortic aneurysm (CMS/HCC), Atrial fibrillation (CMS/HCC), Bacteremia, Calcific aortic stenosis of bicuspid valve, Cardiac arrest (CMS/HCC), Cardiomyopathy (CMS/HCC), Contact dermatitis due to poison ivy, Diabetes mellitus (CMS/HCC), Elbow fracture, Esophageal reflux, GERD (gastroesophageal reflux disease), Head injury, Hyperglycemia, Hyperlipidemia, Hypertension, Kidney carcinoma (CMS/HCC), Leg swelling, Localized swelling of both lower legs, Nasal congestion, Nasal drainage, Nonischemic cardiomyopathy (CMS/HCC), Palpitations, Pedal edema, Pleural effusion on left, Renal insufficiency syndrome, Renal oncocytoma, Seasonal allergic reaction, Sinusitis, Syncope, Type 2 diabetes mellitus (CMS/HCC), Vision changes, and Visual field defect.    Social History     Tobacco Use   • Smoking status: Former Smoker   • Smokeless tobacco: Former User   • Tobacco comment: caffeine use   Substance Use Topics   • Alcohol use: Yes     Comment: occasional   • Drug use: No       Results from last 7 days   Lab Units 01/15/20  0646 01/14/20  0516 01/13/20  0747   INR  1.80* 1.99* 2.21*   HEMOGLOBIN g/dL 15.1 15.6 14.8   HEMATOCRIT % 46.0 47.5 45.2   PLATELETS 10*3/mm3 101* 98* 109*     Results from last 7 days   Lab Units 01/15/20  0646 01/14/20  0516 01/13/20  1932 01/13/20  0747   SODIUM mmol/L 137 138 140 143   POTASSIUM mmol/L 4.8 4.9 4.9 4.4   CHLORIDE mmol/L 104 106 103 107   CO2 mmol/L 22.2 20.2* 22.4 25.2   BUN mg/dL 25* 26* 26* 29*   CREATININE mg/dL 1.42* 1.38* 1.63* 2.04*   CALCIUM mg/dL 9.0 9.0 9.0 8.9   BILIRUBIN mg/dL  --   --   --  0.5   ALK PHOS U/L  --   --   --  80   ALT (SGPT) U/L  --    --   --  20   AST (SGOT) U/L  --   --   --  18   GLUCOSE mg/dL 131* 127* 104* 68     Anticoagulation history: Per last note from [facility] on 12/30, patient's home warfarin regimen is 5 mg qMWF and 7.5 mg TThSSun.     Hospital Anticoagulation:  Consulting provider: LIZA Zayas  Start date: 1/13/20  Indication: Aortic mechanical valve  Target INR: 2.5-3.5  Expected duration: Lifelong  Bridge Therapy: Yes              and enoxapain  Date 1/13 1/14 1/15          INR 2.21 1.99 1.80          Warfarin dose  7.5mg 7.5mg            Potential drug interactions:     Relevant nutrition status: reg cardiac diet    Other: looks like dose on 1/13 was never given    Education complete?/ Date: no    Assessment/Plan:  Dose will continue warfarin 7.5mg daily  Monitor INR daily  Follow up daily    Pharmacy will continue to follow until discharge or discontinuation of warfarin.   Rob Marino PharmD

## 2020-01-15 NOTE — NURSING NOTE
Stroke booklet provided to patient.  Patient's wife, Gladys, at the bedside. Emphasized patient's modifiable risk factors for stroke (DM, HTN, HLD, AFIB). Also discussed purpose of medications (ASA, Lipitor, Warfarin) and the importance of compliance.  Educated patient on signs and symptoms of stroke and the importance of calling 911/seeking immediate medical attention right away for any signs/symptoms of stroke. All questions answered.

## 2020-01-15 NOTE — THERAPY EVALUATION
Acute Care - Speech Language Pathology Initial Evaluation  [facility]     Patient Name: Francisco Sequeira  : 1935  MRN: [id number]  Today's Date: 1/15/2020               Admit Date: 2020     Visit Dx:    ICD-10-CM ICD-9-CM   1. Stroke-like symptom R29.90 781.99   2. H/O mechanical aortic valve replacement Z95.2 V43.3   3. Current use of long term anticoagulation Z79.01 V58.61   4. Subtherapeutic international normalized ratio (INR) R79.1 790.92   5. Acute kidney injury (CMS/HCC) N17.9 584.9     Patient Active Problem List   Diagnosis   • Atrial fibrillation (CMS/HCC)   • Hypertension   • Atopic rhinitis   • Uncontrolled type 2 diabetes mellitus (CMS/HCC)   • Gastroesophageal reflux disease   • Hyperlipidemia   • Renal insufficiency   • Low testosterone   • Special screening for malignant neoplasm of prostate   • Hx of aortic valve replacement, mechanical [Z95.2]   • Screening for iron deficiency anemia   • Stroke-like symptom   • Kidney carcinoma (CMS/HCC)   • CKD (chronic kidney disease) stage 3, GFR 30-59 ml/min (CMS/HCC)   • BYRON (acute kidney injury) (CMS/HCC)   • Acute cerebral infarction (CMS/HCC)     Past Medical History:   Diagnosis Date   • Abnormal electrocardiogram    • Allergic rhinitis    • Aortic valve insufficiency    • Ascending aortic aneurysm (CMS/HCC)    • Atrial fibrillation (CMS/HCC)    • Bacteremia    • Calcific aortic stenosis of bicuspid valve    • Cardiac arrest (CMS/HCC)    • Cardiomyopathy (CMS/HCC)    • Contact dermatitis due to poison ivy    • Diabetes mellitus (CMS/HCC)    • Elbow fracture    • Esophageal reflux    • GERD (gastroesophageal reflux disease)    • Head injury    • Hyperglycemia    • Hyperlipidemia    • Hypertension    • Kidney carcinoma (CMS/HCC)    • Leg swelling    • Localized swelling of both lower legs    • Nasal congestion    • Nasal drainage    • Nonischemic cardiomyopathy (CMS/HCC)    • Palpitations    • Pedal edema    • Pleural effusion on left    •  Renal insufficiency syndrome    • Renal oncocytoma    • Seasonal allergic reaction    • Sinusitis    • Syncope    • Type 2 diabetes mellitus (CMS/HCC)     uncontrolled   • Vision changes    • Visual field defect      Past Surgical History:   Procedure Laterality Date   • AORTIC VALVE REPAIR/REPLACEMENT     • ASCENDING AORTIC ANEURYSM REPAIR W/ MECHANICAL AORTIC VALVE REPLACEMENT     • NEPHRECTOMY     • OTHER SURGICAL HISTORY      elbow surgery   • PROSTATE SURGERY     • THORACENTESIS Left     diagnostic        SLP EVALUATION (last 72 hours)      SLP SLC Evaluation     Row Name 01/15/20 1200                   Communication Assessment/Intervention    Document Type  evaluation  -LN        Subjective Information  no complaints  -LN        Patient Observations  alert;cooperative  -LN        Patient/Family Observations  -- Spouse present.   -LN        Patient Effort  good  -LN        Symptoms Noted During/After Treatment  none  -LN           General Information    Patient Profile Reviewed  yes  -LN        Pertinent History Of Current Problem  MRI indicated small cluster of acute white  matter infarcts are present within the periventricular white matter of  the right frontal lobe posteriorly, and otherwise MRI of brain was unremarkable. CT of head was unremarkable. Pt is tolerating a regular diet, per RN.   -LN        Precautions/Limitations, Vision  WFL;for purposes of eval  -LN        Precautions/Limitations, Hearing  WFL;for purposes of eval  -LN        Prior Level of Function-Communication  WFL  -LN        Plans/Goals Discussed with  patient;spouse/S.O.  -LN        Barriers to Rehab  none identified  -LN        Family Goals for Discharge  family did not state  -LN           Pain Assessment    Additional Documentation  Pain Scale: Numbers Pre/Post-Treatment (Group)  -LN           Pain Scale: Numbers Pre/Post-Treatment    Pain Scale: Numbers, Pretreatment  0/10 - no pain  -LN        Pain Scale: Numbers, Post-Treatment   "0/10 - no pain  -LN           Comprehension Assessment/Intervention    Comprehension Assessment/Intervention  Auditory Comprehension  -LN           Auditory Comprehension Assessment/Intervention    Auditory Comprehension (Communication)  WNL  -LN        Able to Identify Objects/Pictures (Communication)  WNL  -LN        Answers Questions (Communication)  yes/no;wh questions;personal;WFL  -LN        Able to Follow Commands (Communication)  2-step;WFL  -LN        Narrative Discourse  complex paragraph level;mild impairment  -LN        Successful Auditory Strategies (Communication)  repetition  -LN           Expression Assessment/Intervention    Expression Assessment/Intervention  verbal expression  -LN           Verbal Expression Assessment/Intervention    Verbal Expression  WNL  -LN        Automatic Speech (Communication)  response to greeting;WNL  -LN        Phrase Completion  WNL  -LN        Confrontational Naming  WNL  -LN        Spontaneous/Functional Words  WNL  -LN        Sentence Formulation  WNL  -LN        Conversational Discourse/Fluency  WNL  -LN           Oral Motor Structure and Function    Oral Motor Structure and Function  WNL  -LN        Dentition Assessment  natural, present and adequate  -LN        Mucosal Quality  moist, healthy  -LN           Oral Musculature and Cranial Nerve Assessment    Oral Motor General Assessment  lingual impairment Mildly reduced strength (bilaterally) and decreased ROM  -LN           Motor Speech Assessment/Intervention    Motor Speech Function  mild impairment  -LN        Characteristics Consistent with Dysarthria  slow rate Spouse reports occasional \"thick tongue\" speech.   -LN        Conversational Speech (Communication)  WNL  -LN           Cognitive Assessment Intervention- SLP    Cognitive Function (Cognition)  mild impairment;moderate impairment  -LN        Orientation Status (Cognition)  person;place;time;situation;WNL  -LN        Memory (Cognitive)  " short-term;immediate;related  -LN        Attention (Cognitive)  selective;sustained;WNL  -LN        Thought Organization (Cognitive)  concrete divergent;WNL  -LN        Reasoning (Cognitive)  WFL  -LN        Problem Solving (Cognitive)  WNL  -LN        Executive Function (Cognition)  initiation;judgement;WNL;planning;mild impairment  -LN        Pragmatics (Communication)  WNL  -LN           SLP Clinical Impressions    SLP Diagnosis  Mild cognitive-linguistic deficit  -LN        Rehab Potential/Prognosis  good  -LN        SLC Criteria for Skilled Therapy Interventions Met  no problems identified which require skilled intervention;baseline status  -LN        Functional Impact  no impact on function  -LN           Recommendations    Therapy Frequency (SLP SLC)  PRN  -LN        Predicted Duration Therapy Intervention (Days)  until discharge  -LN          User Key  (r) = Recorded By, (t) = Taken By, (c) = Cosigned By    Initials Name Effective Dates    LN Harika Pearson 12/17/19 -              EDUCATION  The patient has been educated in the following areas:     Cognitive Impairment.    SLP Recommendation and Plan  SLP Diagnosis: Mild cognitive-linguistic deficit     SLC Criteria for Skilled Therapy Interventions Met: no problems identified which require skilled intervention, baseline status        Predicted Duration Therapy Intervention (Days): until discharge    Plan of Care Reviewed With: patient, spouse  Outcome Summary: MRI noted mild cortical atrophy and evidence of mild small vessel ischemic change. A small cluster of acute white  matter infarcts are present within the periventricular white matter of  the right frontal lobe posteriorly, and otherwise unremarkable MRI of the brain. CT of head was unremarkable. Cognitive evaluation showed evidence of mild cognitive deficits overall, though the pt and spouse feel no changes from his baseline cognition has occurred. Pt/spouse reports memory deficits at baseline. Pt  is oriented x4. Mild-moderate deficits noted with immediate memory and short-term memory. Divergent thought organization, problem solving, and attention appear WNL. No skilled therapy is warranted at this time.             SLP Outcome Measures (last 72 hours)      SLP Outcome Measures     Row Name 01/15/20 1200             SLP Outcome Measures    Outcome Measure Used?  Adult NOMS  -LN         Adult FCM Scores    FCM Chosen  Auditory Comprehension;Verbal Expression;Attention;Memory  -LN      Auditory Comp Score FCM - (Spoken Language)  7  -LN      Verbal Expression FCM Score  7  -LN      Attention FCM Score  7  -LN      Memory FCM Score  4  -LN        User Key  (r) = Recorded By, (t) = Taken By, (c) = Cosigned By    Initials Name Effective Dates    Harika Waite 12/17/19 -               Time Calculation:     Time Calculation- SLP     Row Name 01/15/20 1247             Time Calculation- SLP    SLP Start Time  1200  -LN      SLP Received On  01/15/20  -LN        User Key  (r) = Recorded By, (t) = Taken By, (c) = Cosigned By    Initials Name Provider Type    Harika Waite Speech and Language Pathologist          Therapy Charges for Today     Code Description Service Date Service Provider Modifiers Qty    [id number]  ST EVAL SPEECH AND PROD W LANG  3 1/15/2020 Harika Pearson GN 1             ADULT NOMS (last 72 hours)      Adult NOMS     Row Name 01/15/20 1200                   Adult FCM Scores    FCM Chosen  Auditory Comprehension;Verbal Expression;Attention;Memory  -LN        Auditory Comp Score FCM - (Spoken Language)  7  -LN        Verbal Expression FCM Score  7  -LN        Attention FCM Score  7  -LN        Memory FCM Score  4  -LN          User Key  (r) = Recorded By, (t) = Taken By, (c) = Cosigned By    Initials Name Effective Dates    Harika Waite 12/17/19 -                  Harika Pearson  1/15/2020

## 2020-01-15 NOTE — PROGRESS NOTES
DOS: 1/15/2020  NAME: Francisco Sequeira   : 1935  PCP: Rubin Hinkle APRN    Chief Complaint   Patient presents with   • Weakness - Generalized        Stroke    Subjective: Pt seen in follow up, however the problem is new to me.  Walking with his walker back to his recliner from the bathroom as I entered the room and was doing very well.  Denies any new weakness, numbness, speech or visual disturbances, or headaches.  He has been doing puzzles in the newspaper without any issues.  Plans for ABELARDO in the morning per his wife.  Mild left facial droop.    Objective:  Vital signs:      Vitals:    20 1951 01/15/20 0001 01/15/20 0510 01/15/20 0704   BP: 157/82 142/81  136/75   BP Location: Right arm Right arm  Right arm   Patient Position: Sitting Lying  Lying   Pulse: 62 75  71   Resp: 16 16  16   Temp: 98.3 °F (36.8 °C) 97.6 °F (36.4 °C)  97.7 °F (36.5 °C)   TempSrc: Oral Oral  Oral   SpO2: 97% 96%  97%   Weight:   77.1 kg (169 lb 14.4 oz)    Height:           Current Facility-Administered Medications:   •  acetaminophen (TYLENOL) tablet 650 mg, 650 mg, Oral, Q4H PRN **OR** acetaminophen (TYLENOL) 160 MG/5ML solution 650 mg, 650 mg, Oral, Q4H PRN **OR** acetaminophen (TYLENOL) suppository 650 mg, 650 mg, Rectal, Q4H PRN, Val Montgomery APRN  •  aspirin tablet 325 mg, 325 mg, Oral, Daily, 325 mg at 01/15/20 0942 **OR** aspirin suppository 300 mg, 300 mg, Rectal, Daily, Val Montgomery APRN  •  atorvastatin (LIPITOR) tablet 20 mg, 20 mg, Oral, Daily, Val Montgomery APRN, 20 mg at 01/15/20 0942  •  dextrose (D50W) 25 g/ 50mL Intravenous Solution 25 g, 25 g, Intravenous, Q15 Min PRN, Val Montgomery APRN, 25 g at 20 1717  •  dextrose (GLUTOSE) oral gel 15 g, 15 g, Oral, Q15 Min PRN, Val Montgomery APRN  •  digoxin (LANOXIN) tablet 125 mcg, 125 mcg, Oral, Daily, Val Montgomery APRN, 125 mcg at 01/15/20 0942  •  enoxaparin (LOVENOX) syringe 80 mg, 1 mg/kg, Subcutaneous, Q12H, Bartolo Nevarez MD,  80 mg at 01/15/20 0555  •  glucagon (human recombinant) (GLUCAGEN DIAGNOSTIC) injection 1 mg, 1 mg, Subcutaneous, Q15 Min PRN, Val Montgomery APRN  •  guaiFENesin (MUCINEX) 12 hr tablet 600 mg, 600 mg, Oral, Q12H, Val Montgomery APRN, 600 mg at 01/15/20 0942  •  insulin lispro (humaLOG) injection 0-7 Units, 0-7 Units, Subcutaneous, 4x Daily With Meals & Nightly, Val Montgomery APRN, 3 Units at 01/14/20 2129  •  magnesium oxide (MAG-OX) tablet 400 mg, 400 mg, Oral, Daily, Val Montgomery APRN, 400 mg at 01/15/20 0942  •  metoprolol succinate XL (TOPROL-XL) 24 hr tablet 25 mg, 25 mg, Oral, Daily, Val Montgomery APRN, 25 mg at 01/15/20 0942  •  nitroglycerin (NITROSTAT) SL tablet 0.4 mg, 0.4 mg, Sublingual, Q5 Min PRN, Val Montgomery APRN  •  ondansetron (ZOFRAN) injection 4 mg, 4 mg, Intravenous, Q6H PRN, Val Montgomery APRN  •  Pharmacy to dose warfarin, , Does not apply, Continuous PRN, Val Montgomery APRN  •  sodium bicarbonate tablet 1,950 mg, 1,950 mg, Oral, BID, Val Montgomery APRN, 1,950 mg at 01/15/20 0942  •  [COMPLETED] Insert peripheral IV, , , Once **AND** sodium chloride 0.9 % flush 10 mL, 10 mL, Intravenous, PRN, Carmen Mclean APRN  •  sodium chloride 0.9 % flush 10 mL, 10 mL, Intravenous, Q12H, Val Montgomery APRN, 10 mL at 01/14/20 2318  •  sodium chloride 0.9 % flush 10 mL, 10 mL, Intravenous, PRN, Val Montgomery APRN  •  sodium chloride 0.9 % flush 10 mL, 10 mL, Intravenous, Q12H, Val Montgomery APRN, 10 mL at 01/15/20 0943  •  sodium chloride 0.9 % flush 10 mL, 10 mL, Intravenous, PRN, Val Montgomery, LIZA  •  warfarin (COUMADIN) tablet 7.5 mg, 7.5 mg, Oral, Once, Val Montgomery APRN    PRN meds  •  acetaminophen **OR** acetaminophen **OR** acetaminophen  •  dextrose  •  dextrose  •  glucagon (human recombinant)  •  nitroglycerin  •  ondansetron  •  Pharmacy to dose warfarin  •  [COMPLETED] Insert peripheral IV **AND** sodium chloride  •  sodium chloride  •  sodium  chloride    No current facility-administered medications on file prior to encounter.      Current Outpatient Medications on File Prior to Encounter   Medication Sig   • atorvastatin (LIPITOR) 20 MG tablet TAKE ONE TABLET BY MOUTH DAILY   • digoxin (LANOXIN) 125 MCG tablet TAKE ONE TABLET BY MOUTH DAILY   • diphenhydrAMINE (BENADRYL) 25 MG tablet Take 100 mg by mouth 2 (Two) Times a Day.   • furosemide (LASIX) 20 MG tablet TAKE ONE TABLET BY MOUTH DAILY (Patient taking differently: Every Other Day. LAST DOSE 1/12/20)   • glucose blood (ACCU-CHEK IFEANYI PLUS) test strip USE 3 TO 4 TIMES DAILY   • glucose blood test strip Accu-Chek Ifeanyi Plus In Vitro Strip; Patient Sig: Accu-Chek Ifeanyi Plus In Vitro Strip CHECK BLOOD SUGAR THREE TIMES A DAY TO FOUR TIMES A DAY; 100; 5; 14-Sep-2015; Active   • guaiFENesin (MUCINEX) 600 MG 12 hr tablet Take  by mouth every 12 (twelve) hours.   • insulin degludec (TRESIBA FLEXTOUCH) 100 UNIT/ML solution pen-injector injection Inject 60 Units under the skin into the appropriate area as directed Daily. (Patient taking differently: Inject 58 Units under the skin into the appropriate area as directed Every Night.)   • Insulin Disposable Pump (V-GO 40) kit USE AS DIRECTED THREE TIMES A DAY   • insulin lispro (HUMALOG) 100 UNIT/ML injection USE INSULIN AS DIRECTED IN V-GO PUMP   • insulin lispro (HUMALOG) 100 UNIT/ML injection USE INSULIN AS DIRECTED IN V-GO PUMP   • Insulin Pen Needle (PEN NEEDLES) 32G X 4 MM misc USE AS DIRECTED   • magnesium oxide (MAG-OX) 400 MG tablet Take 400 mg by mouth 2 (Two) Times a Day.   • metoprolol succinate XL (TOPROL-XL) 25 MG 24 hr tablet TAKE ONE TABLET BY MOUTH DAILY   • Omega-3 Fatty Acids (FISH OIL) 1000 MG capsule capsule Take 4,000 mg by mouth Daily With Breakfast.   • SODIUM BICARBONATE PO Take 500 mg by mouth 2 (Two) Times a Day.   • warfarin (COUMADIN) 5 MG tablet TAKE ON TABLET BY MOUTH ON Monday, Wednesday AND Friday AND 1 AND 1/2 TABLETS ON ALL  OTHER DAYS OR AS DIRECTED   • ACCU-CHEK FASTCLIX LANCETS misc TEST BLOOD SUGAR 3 TO 4 TIMES A DAY   • Blood Glucose Monitoring Suppl (ACCU-CHEK IFEANYI) device Test blood sugar tid   • clobetasol (TEMOVATE) 0.05 % external solution Apply  topically to the appropriate area as directed 2 (Two) Times a Day.   • SAFETY LANCETS 21G misc Test blood sugar twice a day       General appearance: NAD, alert and cooperative, well groomed  HEENT: Normocephalic, atraumatic, PERRL, no masses or tenderness  COR: RRR  Resp: Even and unlabored  Extremities: Equal pulses  Skin: warm, dry    Neurological:   MS: oriented x3, recent/remote memory intact, normal attention/concentration, language intact, no neglect, normal fund of knowledge  CN: visual acuity grossly normal, visual fields full, PERRL, EOMI, facial sensation equal, no facial droop, hearing symmetric, palate elevates symmetrically, shoulder shrug equal, tongue midline  Motor: 5/5 in all 4 ext., normal tone  Reflexes: 1+ in all 4 ext.  Sensory: light touch sensation intact in all 4 ext.  Coordination: Normal finger to nose test, normal heel to shin test  Gait and station: no ataxia  Rapid alternating movements: normal finger to thumb tap    Laboratory results:  No results found for: TSH  Lab Results   Component Value Date    HGBA1C 7.40 (H) 01/14/2020     No results found for: [id number]  Lab Results   Component Value Date    CHOL 154 01/14/2020    CHLPL 165 11/15/2019    CHLPL 165 11/01/2018    CHLPL 178 08/10/2017     Lab Results   Component Value Date    TRIG 161 (H) 01/14/2020    TRIG 206 (H) 11/15/2019    TRIG 189 (H) 11/01/2018     Lab Results   Component Value Date    HDL 34 (L) 01/14/2020    HDL 37 (L) 11/15/2019    HDL 34 (L) 11/01/2018     Lab Results   Component Value Date    LDL 88 01/14/2020    LDL 87 11/15/2019    LDL 93 11/01/2018     Lab Results   Component Value Date    WBC 5.54 01/15/2020    HGB 15.1 01/15/2020    HCT 46.0 01/15/2020    MCV 86.0 01/15/2020      (L) 01/15/2020     Lab Results   Component Value Date    GLUCOSE 131 (H) 01/15/2020    BUN 25 (H) 01/15/2020    CREATININE 1.42 (H) 01/15/2020    EGFRIFNONA 48 (L) 01/15/2020    EGFRIFAFRI 49 (L) 11/15/2019    BCR 17.6 01/15/2020    K 4.8 01/15/2020    CO2 22.2 01/15/2020    CALCIUM 9.0 01/15/2020    PROTENTOTREF 7.0 11/15/2019    ALBUMIN 3.90 01/13/2020    LABIL2 1.7 11/15/2019    AST 18 01/13/2020    ALT 20 01/13/2020     No results found for: PTT  Lab Results   Component Value Date    INR 1.80 (H) 01/15/2020    INR 1.99 (H) 01/14/2020    INR 2.21 (H) 01/13/2020    PROTIME 20.6 (H) 01/15/2020    PROTIME 22.2 (H) 01/14/2020    PROTIME 24.2 (H) 01/13/2020     Brief Urine Lab Results  (Last result in the past 365 days)      Color   Clarity   Blood   Leuk Est   Nitrite   Protein   CREAT   Urine HCG        01/13/20 1044 Yellow Clear Negative Small (1+) Negative Trace               Review and interpretation of imaging:  Ct Head Without Contrast    Result Date: 1/13/2020  1.  No findings of acute intracranial abnormality. 2.  Other findings as above.   This report was finalized on 1/13/2020 8:36 AM by Dr. Antonio Hart M.D.      Mri Brain Without Contrast    Result Date: 1/14/2020  Mild cortical atrophy and evidence of mild small vessel chronic ischemic change as described. A small cluster of acute white matter infarcts are present within the periventricular white matter of the right frontal lobe posteriorly. Otherwise unremarkable MRI of the brain.  This report was finalized on 1/14/2020 11:26 AM by Dr. Francisco Zee M.D.      Xr Chest 1 View    Result Date: 1/13/2020  Mild cardiomegaly. 2. Lungs appear clear.  This report was finalized on 1/13/2020 8:02 AM by Dr. Onel Capps M.D.      Results for orders placed during the hospital encounter of 01/13/20   Adult Transthoracic Echo Complete W/ Cont if Necessary Per Protocol (With Agitated Saline)    Narrative · Estimated EF = 60%.  · Left ventricular  systolic function is normal.  · Left ventricular wall thickness is consistent with mild concentric   hypertrophy.  · Right ventricular cavity is moderately dilated.  · Mildly reduced right ventricular systolic function noted.  · Right atrial cavity size is moderately dilated.  · Left atrial cavity size is mildly dilated.  · Mild to moderate tricuspid valve regurgitation is present.  · Calculated right ventricular systolic pressure from tricuspid   regurgitation is 44.8 mmHg.  · There is a mechanical valve present. The aortic valve peak and mean   gradients are within defined limits.                  Impression/Assessment:  This is a 84-year-old male with a significant past medical history including diabetes, hypertension, hyperlipidemia, atrial fibrillation, cardiomyopathy, aortic valve repair with mechanical aortic valve replacement on Warfarin PTA who presented to the hospital on 1/13/2020 with complaints of left-sided weakness, gait disturbance.  INR 2.21 on admission. BP on arrival 133/83 with an HR of 76. BS 69. Cr. 2.04. EKG revealed atrial fibrillation.    Dx: Scattered right MCA territory infarcts, atrial fibrillation, aortic valve replacement on Coumadin with subtherapeutic INR    Work up to date:  1/13 CT head WO: No acute findings, scalp contusion over the right parietal.  1/13 2D echo: Normal LA volume, mildly dilated LA size, saline test negative, no evidence of PFO, LV function normal, EF 60%, prosthetic valve is normal.  1/14 MRI brain WO: 3 small acute right frontal lobe infarcts, mild small vessel disease.  Labs: CRP 0.30, U/A 2+ bacteria, small leuks.    Plan:  Check TSH, B12, folate levels  Carotid duplex pending  Cardiology consult, appears ABELARDO is ordered for tomorrow no note from them yet will await recs  Continue Lovenox Bridge to Coumadin, Goal INR 2.5-3.5  ASA 325mg  Increase Lipitor to 80mg, LDL 88  Neurochecks per stroke protocol  Okay to start slowly normalizing BP short term goal <140/90,  long term goal <130/80.  Stroke Education  RADHA/SCDs  PT/OT/ST  Appears his U/A showed some bacteria with small leuks, will defer to primary  Therapies as written. CCP for discharge planning. Call RRT for any acute neurological changes. We will continue to follow and advise.    Case discussed with patient, wife, and Dr. Nevarez, and he agrees with plan above.   LIZA Seo

## 2020-01-15 NOTE — PLAN OF CARE
Problem: Patient Care Overview  Goal: Plan of Care Review  Outcome: Ongoing (interventions implemented as appropriate)  Flowsheets (Taken 1/15/2020 1238)  Plan of Care Reviewed With: patient; spouse  Outcome Summary: MRI noteds mild cortical atrophy and evidence of mild small vessel ischemic changes. A small cluster of acute white  matter infarcts are present within the periventricular white matter of  the right frontal lobe posteriorly, and otherwise unremarkable MRI of the brain. CT of head was unremarkable. Cognitive evaluation showed evidence of mild cognitive deficits overall, though the pt and spouse feel no changes from his baseline cognition has occurred. Pt/spouse reports memory deficits at baseline. Pt is oriented x4. Mild-moderate deficits noted with immediate memory and short-term memory. Divergent thought organization, problem solving, and attention appear WNL. No skilled therapy is warranted at this time.

## 2020-01-15 NOTE — PROGRESS NOTES
[facility]     LOS: 1 day     Name: Francisco Sequeira  Age: 84 y.o.  Sex: male  :  1935  MRN: [id number]         Primary Care Physician: Rubin Hinkle APRN    NPO Diet  Diet Regular; Cardiac, Consistent Carbohydrate    Subjective   Patient is seen by bedside, no new complaints.    Review of Systems  Objective   Temp:  [97.6 °F (36.4 °C)-98.3 °F (36.8 °C)] 97.9 °F (36.6 °C)  Heart Rate:  [62-75] 65  Resp:  [16] 16  BP: (128-157)/(69-82) 128/69  SpO2:  [96 %-97 %] 96 %  on   ;   Device (Oxygen Therapy): room air  Body mass index is 23.27 kg/m².    Physical Exam   Constitutional: He is oriented to person, place, and time. He appears well-developed and well-nourished. No distress.   HENT:   Head: Normocephalic and atraumatic.   Nose: Nose normal.   Mouth/Throat: Oropharynx is clear and moist.   Eyes: Conjunctivae are normal. Right eye exhibits no discharge. Left eye exhibits no discharge.   Neck: Normal range of motion. Neck supple.   Cardiovascular: Normal heart sounds. An irregularly irregular rhythm present.  Pulmonary/Chest: Effort normal and breath sounds normal. No respiratory distress.   Abdominal: Soft. Bowel sounds are normal. He exhibits no distension. There is no tenderness.   Musculoskeletal: Normal range of motion. He exhibits no edema or tenderness.   Neurological: He is alert and oriented to person, place, and time. He exhibits normal muscle tone. Coordination normal.   Skin: Skin is warm and dry. He is not diaphoretic. No erythema.   Psychiatric: He has a normal mood and affect. His behavior is normal.   Nursing note and vitals reviewed.       Reviewed medications and new clinical results        Results from last 7 days   Lab Units 01/15/20  0646 20  0516 20  0747   WBC 10*3/mm3 5.54 6.19 4.65   HEMOGLOBIN g/dL 15.1 15.6 14.8   PLATELETS 10*3/mm3 101* 98* 109*     Results from last 7 days   Lab Units 01/15/20  0646  01/14/20  0516 01/13/20  1932 01/13/20  0747   SODIUM mmol/L 137 138 140 143   POTASSIUM mmol/L 4.8 4.9 4.9 4.4   CHLORIDE mmol/L 104 106 103 107   CO2 mmol/L 22.2 20.2* 22.4 25.2   BUN mg/dL 25* 26* 26* 29*   CREATININE mg/dL 1.42* 1.38* 1.63* 2.04*   CALCIUM mg/dL 9.0 9.0 9.0 8.9   GLUCOSE mg/dL 131* 127* 104* 68     Lab Results   Component Value Date    ANIONGAP 10.8 01/15/2020     Glucose   Date/Time Value Ref Range Status   01/15/2020 1123 222 (H) 70 - 130 mg/dL Final   01/15/2020 0602 120 70 - 130 mg/dL Final   01/14/2020 2022 246 (H) 70 - 130 mg/dL Final   01/14/2020 1717 153 (H) 70 - 130 mg/dL Final   01/14/2020 1114 207 (H) 70 - 130 mg/dL Final   01/14/2020 0621 123 70 - 130 mg/dL Final   01/13/2020 2123 219 (H) 70 - 130 mg/dL Final   01/13/2020 1713 46 (C) 70 - 130 mg/dL Final     Hemoglobin A1C   Date Value Ref Range Status   01/14/2020 7.40 (H) 4.80 - 5.60 % Final     Estimated Creatinine Clearance: 43.7 mL/min (A) (by C-G formula based on SCr of 1.42 mg/dL (H)).    Assessment/Plan   Active Hospital Problems    Diagnosis  POA   • BYRON (acute kidney injury) (CMS/HCC) [N17.9]  Yes   • Acute cerebral infarction (CMS/HCC) [I63.9]  Yes   • Stroke-like symptom [R29.90]  Yes   • Kidney carcinoma (CMS/HCC) [C64.9]  Yes   • CKD (chronic kidney disease) stage 3, GFR 30-59 ml/min (CMS/HCC) [N18.3]  Yes   • Hx of aortic valve replacement, mechanical [Z95.2] [Z95.2]  Not Applicable   • Uncontrolled type 2 diabetes mellitus (CMS/[facility]) [E11.65]  Yes   • Hyperlipidemia [E78.5]  Yes   • Atrial fibrillation (CMS/HCC) [I48.91]  Yes   • Hypertension [I10]  Yes      Resolved Hospital Problems   No resolved problems to display.     84 y.o. male     Assessment and plan:  1.  Stroke-like symptoms.  Patient complains of weakness in lower extremities.  Neurology on board.  Follow recommendations.  MRI of the brain, it showed a small cluster of acute white matter infarcts are present within the periventricular white matter of the  right frontal lobe posteriorly.  Continue aspirin and statin therapy. Lipid Panel and hemoglobin A1c reviewed.  Plans for ABELARDO were noted.  2.  Acute on chronic kidney injury.  Renal function is much improved today.  3.  Atrial fibrillation.  Patient is currently rate controlled.  Continue Coumadin.  Continue to monitor INR.  Continue Lovenox to bridge with Coumadin until INR in the therapeutic range.  4.  Diabetes mellitus.  Mild hypoglycemia was noted, continue Accu-Cheks and use sliding scale insulin coverage.  5.  Hyperlipidemia.  Continue statin therapy.  6.  Further plans based on hospital course, we would continue to monitor.       Fernando Bernal MD   01/15/20  3:10 PM

## 2020-01-15 NOTE — PLAN OF CARE
Problem: Patient Care Overview  Goal: Plan of Care Review  Outcome: Ongoing (interventions implemented as appropriate)  Flowsheets (Taken 1/15/2020 0881)  Progress: improving  Plan of Care Reviewed With: patient;spouse  Outcome Summary: Patient showing improvement with gait pattern and distance ambulated. Required Min A today with gait, verbal cues to help decrease foot drop on left side. MRI yesterday with scattered RMCA territory stroke per neurology note.  Spouse educated on caution with walking pt on her own due to his fall risk during mobility. Cont with skilled PT for gait training and balance.

## 2020-01-15 NOTE — THERAPY TREATMENT NOTE
Patient Name: Francisco Sequeira  : 1935    MRN: [id number]                              Today's Date: 1/15/2020       Admit Date: 2020    Visit Dx:     ICD-10-CM ICD-9-CM   1. Stroke-like symptom R29.90 781.99   2. H/O mechanical aortic valve replacement Z95.2 V43.3   3. Current use of long term anticoagulation Z79.01 V58.61   4. Subtherapeutic international normalized ratio (INR) R79.1 790.92   5. Acute kidney injury (CMS/HCC) N17.9 584.9     Patient Active Problem List   Diagnosis   • Atrial fibrillation (CMS/HCC)   • Hypertension   • Atopic rhinitis   • Uncontrolled type 2 diabetes mellitus (CMS/HCC)   • Gastroesophageal reflux disease   • Hyperlipidemia   • Renal insufficiency   • Low testosterone   • Special screening for malignant neoplasm of prostate   • Hx of aortic valve replacement, mechanical [Z95.2]   • Screening for iron deficiency anemia   • Stroke-like symptom   • Kidney carcinoma (CMS/HCC)   • CKD (chronic kidney disease) stage 3, GFR 30-59 ml/min (CMS/HCC)   • BYRON (acute kidney injury) (CMS/HCC)   • Acute cerebral infarction (CMS/HCC)     Past Medical History:   Diagnosis Date   • Abnormal electrocardiogram    • Allergic rhinitis    • Aortic valve insufficiency    • Ascending aortic aneurysm (CMS/HCC)    • Atrial fibrillation (CMS/HCC)    • Bacteremia    • Calcific aortic stenosis of bicuspid valve    • Cardiac arrest (CMS/HCC)    • Cardiomyopathy (CMS/HCC)    • Contact dermatitis due to poison ivy    • Diabetes mellitus (CMS/HCC)    • Elbow fracture    • Esophageal reflux    • GERD (gastroesophageal reflux disease)    • Head injury    • Hyperglycemia    • Hyperlipidemia    • Hypertension    • Kidney carcinoma (CMS/HCC)    • Leg swelling    • Localized swelling of both lower legs    • Nasal congestion    • Nasal drainage    • Nonischemic cardiomyopathy (CMS/HCC)    • Palpitations    • Pedal edema    • Pleural effusion on left    • Renal insufficiency syndrome    • Renal oncocytoma    •  Seasonal allergic reaction    • Sinusitis    • Syncope    • Type 2 diabetes mellitus (CMS/HCC)     uncontrolled   • Vision changes    • Visual field defect      Past Surgical History:   Procedure Laterality Date   • AORTIC VALVE REPAIR/REPLACEMENT     • ASCENDING AORTIC ANEURYSM REPAIR W/ MECHANICAL AORTIC VALVE REPLACEMENT     • NEPHRECTOMY     • OTHER SURGICAL HISTORY      elbow surgery   • PROSTATE SURGERY     • THORACENTESIS Left     diagnostic     General Information     Row Name 01/15/20 0933          PT Evaluation Time/Intention    Document Type  therapy note (daily note)  -MW     Mode of Treatment  physical therapy;individual therapy  -     Row Name 01/15/20 0933          General Information    Existing Precautions/Restrictions  fall  -     Row Name 01/15/20 0933          Cognitive Assessment/Intervention- PT/OT    Orientation Status (Cognition)  oriented x 3  -MW     Row Name 01/15/20 0933          Safety Issues, Functional Mobility    Impairments Affecting Function (Mobility)  balance;coordination;endurance/activity tolerance;strength  -     Comment, Safety Issues/Impairments (Mobility)  Gait belt used for safety.  -       User Key  (r) = Recorded By, (t) = Taken By, (c) = Cosigned By    Initials Name Provider Type     Tierra Latham, PT Physical Therapist        Mobility     Row Name 01/15/20 0934          Bed Mobility Assessment/Treatment    Comment (Bed Mobility)  NT, up in chair  -     Row Name 01/15/20 0934          Sit-Stand Transfer    Sit-Stand [place] (Transfers)  verbal cues;contact guard  -     Assistive Device (Sit-Stand Transfers)  walker, front-wheeled  -     Row Name 01/15/20 0934          Gait/Stairs Assessment/Training    Gait/Stairs Assessment/Training  gait/ambulation independence;gait/ambulation assistive device  -     [place] Level (Gait)  minimum assist (75% patient effort);verbal cues  -     Assistive Device (Gait)  walker, front-wheeled  -      Distance in Feet (Gait)  150  -MW     Pattern (Gait)  step-to  -MW     Deviations/Abnormal Patterns (Gait)  left sided deviations;ataxic;base of support, narrow;gait speed decreased  -MW     Left Sided Gait Deviations  knee hyperextension;heel strike decreased  -MW     Comment (Gait/Stairs)  Increased gait distance, left knee hyperextension still present but intermittent today and less compared to yesterday, verbal cues to raise toes to avoid foot drop.   -MW       User Key  (r) = Recorded By, (t) = Taken By, (c) = Cosigned By    Initials Name Provider Type    Tierra Sandhu PT Physical Therapist        Obj/Interventions     Row Name 01/15/20 0937          Therapeutic Exercise    Comment (Therapeutic Exercise)  Therex deferred due to pt just getting his breakfast; ex's reviewed briefly; pt reports that he's been doing them since yesterday.  -MW       User Key  (r) = Recorded By, (t) = Taken By, (c) = Cosigned By    Initials Name Provider Type    Tierra Sandhu PT Physical Therapist        Goals/Plan    No documentation.       Clinical Impression     Row Name 01/15/20 0938          Pain Assessment    Additional Documentation  Pain Scale: Numbers Pre/Post-Treatment (Group)  -     Row Name 01/15/20 0938          Pain Scale: Numbers Pre/Post-Treatment    Pain Scale: Numbers, Pretreatment  0/10 - no pain  -     Pain Scale: Numbers, Post-Treatment  0/10 - no pain  -     Row Name 01/15/20 0938          Plan of Care Review    Plan of Care Reviewed With  patient;spouse  -     Progress  improving  -MW     Outcome Summary  Patient showing improvement with gait pattern and distance ambulated. Required Min A today with gait, verbal cues to help decrease foot drop on left side. MRI yesterday with scattered RMCA territory stroke per neurology note.  Spouse educated on caution with walking pt on her own due to his fall risk during mobility. Cont with skilled PT for gait training and balance.    -     Row Name  "01/15/20 0938          Physical Therapy Clinical Impression    Patient/Family Goals Statement (PT Clinical Impression)  Pt reports, \"I haven't had anything to eat.\"  -MW     Row Name 01/15/20 0938          Vital Signs    Pre Systolic BP Rehab  136  -MW     Pre Treatment Diastolic BP  75  -MW     Pretreatment Heart Rate (beats/min)  85  -MW     Posttreatment Heart Rate (beats/min)  71  -MW     Pre SpO2 (%)  94  -MW     O2 Delivery Pre Treatment  room air  -MW     O2 Delivery Intra Treatment  room air  -MW     Post SpO2 (%)  97  -MW     O2 Delivery Post Treatment  room air  -MW     Pre Patient Position  Sitting  -MW     Intra Patient Position  Standing  -MW     Post Patient Position  Sitting  -MW     Row Name 01/15/20 0938          Positioning and Restraints    Pre-Treatment Position  sitting in chair/recliner  -MW     Post Treatment Position  chair  -MW     In Chair  sitting;call light within reach;encouraged to call for assist;with family/caregiver  -MW       User Key  (r) = Recorded By, (t) = Taken By, (c) = Cosigned By    Initials Name Provider Type    MW Teirra Latham, PT Physical Therapist        Outcome Measures     Row Name 01/15/20 0945          How much help from another person do you currently need...    Turning from your back to your side while in flat bed without using bedrails?  4  -MW     Moving from lying on back to sitting on the side of a flat bed without bedrails?  3  -MW     Moving to and from a bed to a chair (including a wheelchair)?  3  -MW     Standing up from a chair using your arms (e.g., wheelchair, bedside chair)?  3  -MW     Climbing 3-5 steps with a railing?  2  -MW     To walk in hospital room?  3  -MW     AM-PAC 6 Clicks Score (PT)  18  -MW     Row Name 01/15/20 0945          Modified [place] Scale    Modified [place] Scale  4 - Moderately severe disability.  Unable to walk without assistance, and unable to attend to own bodily needs without assistance.  -MW     Row Name 01/15/20 0945    "       Functional Assessment    Outcome Measure Options  AM-PAC 6 Clicks Basic Mobility (PT);Modified [place]  -MW       User Key  (r) = Recorded By, (t) = Taken By, (c) = Cosigned By    Initials Name Provider Type    Tierra Sandhu PT Physical Therapist        Physical Therapy Education                 Title: PT OT SLP Therapies (In Progress)     Topic: Physical Therapy (Done)     Point: Mobility training (Done)     Description:   Instruct learner(s) on safety and technique for assisting patient out of bed, chair or wheelchair.  Instruct in the proper use of assistive devices, such as walker, crutches, cane or brace.              Patient Friendly Description:   It's important to get you on your feet again, but we need to do so in a way that is safe for you. Falling has serious consequences, and your personal safety is the most important thing of all.        When it's time to get out of bed, one of us or a family member will sit next to you on the bed to give you support.     If your doctor or nurse tells you to use a walker, crutches, a cane, or a brace, be sure you use it every time you get out of bed, even if you think you don't need it.    Learning Progress Summary           Patient Acceptance, E,D, VU,NR by  at 1/15/2020 0945    Comment:  Spouse educated on caution with walking pt on her own due to his fall risk    Acceptance, E,D, NR by  at 1/14/2020 0932    Comment:  Safety, request assitance for transfers & gait with RW, PT POC   Significant Other Acceptance, E,D, VU,NR by JODY at 1/15/2020 0945    Comment:  Spouse educated on caution with walking pt on her own due to his fall risk    Acceptance, E,D, NR by JODY at 1/14/2020 0932    Comment:  Safety, request assitance for transfers & gait with RW, PT POC                   Point: Home exercise program (Done)     Description:   Instruct learner(s) on appropriate technique for monitoring, assisting and/or progressing patient with therapeutic exercises and  activities.              Learning Progress Summary           Patient Acceptance, E,D, VU,NR by MW at 1/15/2020 0945    Comment:  Spouse educated on caution with walking pt on her own due to his fall risk    Acceptance, E,D, NR by MW at 1/14/2020 0932    Comment:  Safety, request assitance for transfers & gait with RW, PT POC   Significant Other Acceptance, E,D, VU,NR by MW at 1/15/2020 0945    Comment:  Spouse educated on caution with walking pt on her own due to his fall risk    Acceptance, E,D, NR by MW at 1/14/2020 0932    Comment:  Safety, request assitance for transfers & gait with RW, PT POC                   Point: Body mechanics (Done)     Description:   Instruct learner(s) on proper positioning and spine alignment for patient and/or caregiver during mobility tasks and/or exercises.              Learning Progress Summary           Patient Acceptance, E,D, VU,NR by MW at 1/15/2020 0945    Comment:  Spouse educated on caution with walking pt on her own due to his fall risk    Acceptance, E,D, NR by MW at 1/14/2020 0932    Comment:  Safety, request assitance for transfers & gait with RW, PT POC   Significant Other Acceptance, E,D, VU,NR by MW at 1/15/2020 0945    Comment:  Spouse educated on caution with walking pt on her own due to his fall risk    Acceptance, E,D, NR by MW at 1/14/2020 0932    Comment:  Safety, request assitance for transfers & gait with RW, PT POC                   Point: Precautions (Done)     Description:   Instruct learner(s) on prescribed precautions during mobility and gait tasks              Learning Progress Summary           Patient Acceptance, E,D, VU,NR by MW at 1/15/2020 0945    Comment:  Spouse educated on caution with walking pt on her own due to his fall risk    Acceptance, E,D, NR by MW at 1/14/2020 0932    Comment:  Safety, request assitance for transfers & gait with RW, PT POC   Significant Other Acceptance, E,D, VU,NR by MW at 1/15/2020 0945    Comment:  Spouse educated on  caution with walking pt on her own due to his fall risk    Acceptance, E,D, NR by  at 1/14/2020 0932    Comment:  Safety, request assitance for transfers & gait with RW, PT POC                               User Key     Initials Effective Dates Name Provider Type Discipline     09/17/19 -  iTerra Latham PT Physical Therapist PT              PT Recommendation and Plan  Planned Therapy Interventions (PT Eval): balance training, bed mobility training, gait training, home exercise program, neuromuscular re-education, patient/family education, stair training, strengthening, transfer training  Outcome Summary/Treatment Plan (PT)  Anticipated Equipment Needs at Discharge (PT): front wheeled walker  Anticipated Discharge Disposition (PT): home with home health, home with assist, home with OP services  Plan of Care Reviewed With: patient, spouse  Progress: improving  Outcome Summary: Patient showing improvement with gait pattern and distance ambulated. Required Min A today with gait, verbal cues to help decrease foot drop on left side. MRI yesterday with scattered RMCA territory stroke per neurology note.  Spouse educated on caution with walking pt on her own due to his fall risk during mobility. Cont with skilled PT for gait training and balance.       Time Calculation:   PT Charges     Row Name 01/15/20 0949             Time Calculation    Start Time  0918  -MW      Stop Time  0932  -MW      Time Calculation (min)  14 min  -MW      PT Received On  01/15/20  -MW      PT - Next Appointment  01/16/20  -MW         Time Calculation- PT    Total Timed Code Minutes- PT  11 minute(s)  -        User Key  (r) = Recorded By, (t) = Taken By, (c) = Cosigned By    Initials Name Provider Type    Tierra Sandhu PT Physical Therapist        Therapy Charges for Today     Code Description Service Date Service Provider Modifiers Qty    08011447247  PT EVAL MOD COMPLEXITY 2 1/14/2020 Tierra Latham, PT GP 1    34418193190 HC PT  THER PROC EA 15 MIN 1/14/2020 Tierra Latham, PT GP 1    94329856718 HC GAIT TRAINING EA 15 MIN 1/15/2020 Tierra Latham, PT GP 1          PT G-Codes  Outcome Measure Options: AM-PAC 6 Clicks Basic Mobility (PT), Modified [place]  AM-PAC 6 Clicks Score (PT): 18  AM-PAC 6 Clicks Score (OT): 16  Modified [place] Scale: 4 - Moderately severe disability.  Unable to walk without assistance, and unable to attend to own bodily needs without assistance.    Tierra Latham, PT  1/15/2020

## 2020-01-15 NOTE — PLAN OF CARE
Problem: Patient Care Overview  Goal: Plan of Care Review  Outcome: Ongoing (interventions implemented as appropriate)  Flowsheets (Taken 1/15/2020 3892)  Outcome Summary: Patient reports sone weakness in LUE, tremors more pronounced, resting quietly, denies discomfort.

## 2020-01-16 ENCOUNTER — APPOINTMENT (OUTPATIENT)
Dept: CARDIOLOGY | Facility: HOSPITAL | Age: 85
End: 2020-01-16

## 2020-01-16 LAB
ANION GAP SERPL CALCULATED.3IONS-SCNC: 13 MMOL/L (ref 5–15)
BH CV ECHO MEAS - BSA(HAYCOCK): 2 M^2
BH CV ECHO MEAS - BSA: 2 M^2
BH CV ECHO MEAS - BZI_BMI: 23.4 KILOGRAMS/M^2
BH CV ECHO MEAS - BZI_METRIC_HEIGHT: 180.3 CM
BH CV ECHO MEAS - BZI_METRIC_WEIGHT: 76.2 KG
BH CV ECHO MEAS - RVSP: 40 MMHG
BH CV VAS BP LEFT ARM: NORMAL MMHG
BUN BLD-MCNC: 30 MG/DL (ref 8–23)
BUN/CREAT SERPL: 20.5 (ref 7–25)
CALCIUM SPEC-SCNC: 9.4 MG/DL (ref 8.6–10.5)
CHLORIDE SERPL-SCNC: 104 MMOL/L (ref 98–107)
CO2 SERPL-SCNC: 21 MMOL/L (ref 22–29)
CREAT BLD-MCNC: 1.46 MG/DL (ref 0.76–1.27)
GFR SERPL CREATININE-BSD FRML MDRD: 46 ML/MIN/1.73
GLUCOSE BLD-MCNC: 167 MG/DL (ref 65–99)
GLUCOSE BLDC GLUCOMTR-MCNC: 157 MG/DL (ref 70–130)
GLUCOSE BLDC GLUCOMTR-MCNC: 244 MG/DL (ref 70–130)
GLUCOSE BLDC GLUCOMTR-MCNC: 285 MG/DL (ref 70–130)
INR PPP: 1.59 (ref 0.9–1.1)
LV EF 2D ECHO EST: 60 %
POTASSIUM BLD-SCNC: 4.8 MMOL/L (ref 3.5–5.2)
PROTHROMBIN TIME: 18.6 SECONDS (ref 11.7–14.2)
SODIUM BLD-SCNC: 138 MMOL/L (ref 136–145)

## 2020-01-16 PROCEDURE — 25010000002 MIDAZOLAM PER 1 MG: Performed by: INTERNAL MEDICINE

## 2020-01-16 PROCEDURE — 80048 BASIC METABOLIC PNL TOTAL CA: CPT | Performed by: NURSE PRACTITIONER

## 2020-01-16 PROCEDURE — 99233 SBSQ HOSP IP/OBS HIGH 50: CPT | Performed by: NURSE PRACTITIONER

## 2020-01-16 PROCEDURE — 93312 ECHO TRANSESOPHAGEAL: CPT

## 2020-01-16 PROCEDURE — 93325 DOPPLER ECHO COLOR FLOW MAPG: CPT

## 2020-01-16 PROCEDURE — 99152 MOD SED SAME PHYS/QHP 5/>YRS: CPT

## 2020-01-16 PROCEDURE — 25010000002 FENTANYL CITRATE (PF) 100 MCG/2ML SOLUTION: Performed by: INTERNAL MEDICINE

## 2020-01-16 PROCEDURE — B24BZZ4 ULTRASONOGRAPHY OF HEART WITH AORTA, TRANSESOPHAGEAL: ICD-10-PCS | Performed by: INTERNAL MEDICINE

## 2020-01-16 PROCEDURE — 25010000002 ENOXAPARIN PER 10 MG: Performed by: PSYCHIATRY & NEUROLOGY

## 2020-01-16 PROCEDURE — 85610 PROTHROMBIN TIME: CPT | Performed by: NURSE PRACTITIONER

## 2020-01-16 PROCEDURE — 93312 ECHO TRANSESOPHAGEAL: CPT | Performed by: INTERNAL MEDICINE

## 2020-01-16 PROCEDURE — 63710000001 INSULIN LISPRO (HUMAN) PER 5 UNITS: Performed by: NURSE PRACTITIONER

## 2020-01-16 PROCEDURE — 93320 DOPPLER ECHO COMPLETE: CPT

## 2020-01-16 PROCEDURE — 93325 DOPPLER ECHO COLOR FLOW MAPG: CPT | Performed by: INTERNAL MEDICINE

## 2020-01-16 PROCEDURE — 82962 GLUCOSE BLOOD TEST: CPT

## 2020-01-16 PROCEDURE — 93320 DOPPLER ECHO COMPLETE: CPT | Performed by: INTERNAL MEDICINE

## 2020-01-16 RX ORDER — SODIUM CHLORIDE 9 MG/ML
INJECTION, SOLUTION INTRAVENOUS
Status: COMPLETED | OUTPATIENT
Start: 2020-01-16 | End: 2020-01-16

## 2020-01-16 RX ORDER — MIDAZOLAM HYDROCHLORIDE 1 MG/ML
INJECTION INTRAMUSCULAR; INTRAVENOUS
Status: COMPLETED | OUTPATIENT
Start: 2020-01-16 | End: 2020-01-16

## 2020-01-16 RX ORDER — WARFARIN SODIUM 7.5 MG/1
7.5 TABLET ORAL
Status: COMPLETED | OUTPATIENT
Start: 2020-01-16 | End: 2020-01-16

## 2020-01-16 RX ORDER — ATORVASTATIN CALCIUM 20 MG/1
40 TABLET, FILM COATED ORAL DAILY
Status: DISCONTINUED | OUTPATIENT
Start: 2020-01-17 | End: 2020-01-21 | Stop reason: HOSPADM

## 2020-01-16 RX ORDER — FENTANYL CITRATE 50 UG/ML
INJECTION, SOLUTION INTRAMUSCULAR; INTRAVENOUS
Status: COMPLETED | OUTPATIENT
Start: 2020-01-16 | End: 2020-01-16

## 2020-01-16 RX ADMIN — FENTANYL CITRATE 25 MCG: 50 INJECTION INTRAMUSCULAR; INTRAVENOUS at 11:03

## 2020-01-16 RX ADMIN — INSULIN LISPRO 3 UNITS: 100 INJECTION, SOLUTION INTRAVENOUS; SUBCUTANEOUS at 21:53

## 2020-01-16 RX ADMIN — SODIUM BICARBONATE 1950 MG: 650 TABLET ORAL at 12:22

## 2020-01-16 RX ADMIN — ATORVASTATIN CALCIUM 20 MG: 20 TABLET, FILM COATED ORAL at 12:23

## 2020-01-16 RX ADMIN — DIGOXIN 125 MCG: 125 TABLET ORAL at 12:23

## 2020-01-16 RX ADMIN — SODIUM CHLORIDE, PRESERVATIVE FREE 10 ML: 5 INJECTION INTRAVENOUS at 21:54

## 2020-01-16 RX ADMIN — GUAIFENESIN 600 MG: 600 TABLET, EXTENDED RELEASE ORAL at 21:54

## 2020-01-16 RX ADMIN — WARFARIN SODIUM 7.5 MG: 7.5 TABLET ORAL at 18:04

## 2020-01-16 RX ADMIN — MIDAZOLAM 1 MG: 1 INJECTION INTRAMUSCULAR; INTRAVENOUS at 11:08

## 2020-01-16 RX ADMIN — METOPROLOL SUCCINATE 25 MG: 25 TABLET, EXTENDED RELEASE ORAL at 12:22

## 2020-01-16 RX ADMIN — SODIUM BICARBONATE 1950 MG: 650 TABLET ORAL at 21:54

## 2020-01-16 RX ADMIN — ASPIRIN 325 MG: 325 TABLET ORAL at 12:23

## 2020-01-16 RX ADMIN — SODIUM CHLORIDE 125 ML/HR: 9 INJECTION, SOLUTION INTRAVENOUS at 10:16

## 2020-01-16 RX ADMIN — INSULIN LISPRO 4 UNITS: 100 INJECTION, SOLUTION INTRAVENOUS; SUBCUTANEOUS at 18:03

## 2020-01-16 RX ADMIN — GUAIFENESIN 600 MG: 600 TABLET, EXTENDED RELEASE ORAL at 12:23

## 2020-01-16 RX ADMIN — Medication 400 MG: at 12:23

## 2020-01-16 RX ADMIN — FENTANYL CITRATE 12.5 MCG: 50 INJECTION INTRAMUSCULAR; INTRAVENOUS at 11:08

## 2020-01-16 RX ADMIN — MIDAZOLAM 2 MG: 1 INJECTION INTRAMUSCULAR; INTRAVENOUS at 11:03

## 2020-01-16 RX ADMIN — ENOXAPARIN SODIUM 80 MG: 80 INJECTION SUBCUTANEOUS at 18:04

## 2020-01-16 RX ADMIN — SODIUM CHLORIDE, PRESERVATIVE FREE 10 ML: 5 INJECTION INTRAVENOUS at 12:24

## 2020-01-16 NOTE — PROGRESS NOTES
Pharmacy Consult: Warfarin Dosing/ Monitoring    Francisco Sequeira is a 84 y.o. male, estimated creatinine clearance is 43.7 mL/min (A) (by C-G formula based on SCr of 1.42 mg/dL (H)). weighing 77.1 kg (169 lb 14.4 oz).     has a past medical history of Abnormal electrocardiogram, Allergic rhinitis, Aortic valve insufficiency, Ascending aortic aneurysm (CMS/HCC), Atrial fibrillation (CMS/HCC), Bacteremia, Calcific aortic stenosis of bicuspid valve, Cardiac arrest (CMS/HCC), Cardiomyopathy (CMS/HCC), Contact dermatitis due to poison ivy, Diabetes mellitus (CMS/HCC), Elbow fracture, Esophageal reflux, GERD (gastroesophageal reflux disease), Head injury, Hyperglycemia, Hyperlipidemia, Hypertension, Kidney carcinoma (CMS/HCC), Leg swelling, Localized swelling of both lower legs, Nasal congestion, Nasal drainage, Nonischemic cardiomyopathy (CMS/HCC), Palpitations, Pedal edema, Pleural effusion on left, Renal insufficiency syndrome, Renal oncocytoma, Seasonal allergic reaction, Sinusitis, Syncope, Type 2 diabetes mellitus (CMS/HCC), Vision changes, and Visual field defect.    Social History     Tobacco Use    Smoking status: Former Smoker    Smokeless tobacco: Former User    Tobacco comment: caffeine use   Substance Use Topics    Alcohol use: Yes     Comment: occasional    Drug use: No       Results from last 7 days   Lab Units 01/15/20  0646 01/14/20  0516 01/13/20  0747   INR  1.80* 1.99* 2.21*   HEMOGLOBIN g/dL 15.1 15.6 14.8   HEMATOCRIT % 46.0 47.5 45.2   PLATELETS 10*3/mm3 101* 98* 109*     Results from last 7 days   Lab Units 01/15/20  0646 01/14/20  0516 01/13/20  1932 01/13/20  0747   SODIUM mmol/L 137 138 140 143   POTASSIUM mmol/L 4.8 4.9 4.9 4.4   CHLORIDE mmol/L 104 106 103 107   CO2 mmol/L 22.2 20.2* 22.4 25.2   BUN mg/dL 25* 26* 26* 29*   CREATININE mg/dL 1.42* 1.38* 1.63* 2.04*   CALCIUM mg/dL 9.0 9.0 9.0 8.9   BILIRUBIN mg/dL  --   --   --  0.5   ALK PHOS U/L  --   --   --  80   ALT (SGPT) U/L  --   --    --  20   AST (SGOT) U/L  --   --   --  18   GLUCOSE mg/dL 131* 127* 104* 68     Anticoagulation history: Per last note from [facility] on 12/30, patient's home warfarin regimen is 5 mg qMWF and 7.5 mg TThSSun.     Hospital Anticoagulation:  Consulting provider: LIZA Zayas  Start date: 1/13/20  Indication: Aortic mechanical valve  Target INR: 2.5-3.5  Expected duration: Lifelong  Bridge Therapy: Yes              and enoxapain  Date 1/13 1/14 1/15 1/16         INR 2.21 1.99 1.80 1.59         Warfarin dose  7.5mg 7.5mg 7.5mg           Potential drug interactions:     Relevant nutrition status: reg cardiac diet    Other: looks like dose on 1/13 was never given    Education complete?/ Date: no    Assessment/Plan:  Dose will continue warfarin 7.5mg daily  Monitor INR daily  Follow up daily    Pharmacy will continue to follow until discharge or discontinuation of warfarin.   Thuy Leach, [facility]

## 2020-01-16 NOTE — PROGRESS NOTES
[facility]     LOS: 2 days     Name: Francisco Sequeira  Age: 84 y.o.  Sex: male  :  1935  MRN: [id number]         Primary Care Physician: Rubin Hinkle APRN    Diet Regular; Cardiac, Consistent Carbohydrate    Subjective   Patient is seen by bedside, no new complaints.    Objective   Temp:  [97.2 °F (36.2 °C)-98.1 °F (36.7 °C)] 97.2 °F (36.2 °C)  Heart Rate:  [] 67  Resp:  [12-20] 16  BP: (114-167)/(57-93) 114/60  SpO2:  [95 %-100 %] 96 %  on   ;   Device (Oxygen Therapy): room air  Body mass index is 23.03 kg/m².    Physical Exam   Constitutional: He is oriented to person, place, and time. He appears well-developed and well-nourished. No distress.   HENT:   Head: Normocephalic and atraumatic.   Nose: Nose normal.   Mouth/Throat: Oropharynx is clear and moist.   Eyes: Conjunctivae are normal. Right eye exhibits no discharge. Left eye exhibits no discharge.   Neck: Normal range of motion. Neck supple.   Cardiovascular: Normal heart sounds. An irregularly irregular rhythm present.  Pulmonary/Chest: Effort normal and breath sounds normal. No respiratory distress.   Abdominal: Soft. Bowel sounds are normal. He exhibits no distension. There is no tenderness.   Musculoskeletal: Normal range of motion. He exhibits no edema or tenderness.   Neurological: He is alert and oriented to person, place, and time. He exhibits normal muscle tone. Coordination normal.   Skin: Skin is warm and dry. He is not diaphoretic. No erythema.   Psychiatric: He has a normal mood and affect. His behavior is normal.   Nursing note and vitals reviewed.    Reviewed medications and new clinical results        Results from last 7 days   Lab Units 01/15/20  0646 20  0516 20  0747   WBC 10*3/mm3 5.54 6.19 4.65   HEMOGLOBIN g/dL 15.1 15.6 14.8   PLATELETS 10*3/mm3 101* 98* 109*     Results from last 7 days   Lab Units 20  0559 01/15/20  0646 20  0516  01/13/20  1932 01/13/20  0747   SODIUM mmol/L 138 137 138 140 143   POTASSIUM mmol/L 4.8 4.8 4.9 4.9 4.4   CHLORIDE mmol/L 104 104 106 103 107   CO2 mmol/L 21.0* 22.2 20.2* 22.4 25.2   BUN mg/dL 30* 25* 26* 26* 29*   CREATININE mg/dL 1.46* 1.42* 1.38* 1.63* 2.04*   CALCIUM mg/dL 9.4 9.0 9.0 9.0 8.9   GLUCOSE mg/dL 167* 131* 127* 104* 68     Lab Results   Component Value Date    ANIONGAP 13.0 01/16/2020     Glucose   Date/Time Value Ref Range Status   01/16/2020 0643 157 (H) 70 - 130 mg/dL Final   01/15/2020 2134 238 (H) 70 - 130 mg/dL Final   01/15/2020 1655 200 (H) 70 - 130 mg/dL Final   01/15/2020 1123 222 (H) 70 - 130 mg/dL Final   01/15/2020 0602 120 70 - 130 mg/dL Final   01/14/2020 2022 246 (H) 70 - 130 mg/dL Final   01/14/2020 1717 153 (H) 70 - 130 mg/dL Final   01/14/2020 1114 207 (H) 70 - 130 mg/dL Final     Hemoglobin A1C   Date Value Ref Range Status   01/14/2020 7.40 (H) 4.80 - 5.60 % Final     Estimated Creatinine Clearance: 42.1 mL/min (A) (by C-G formula based on SCr of 1.46 mg/dL (H)).    Assessment/Plan   Active Hospital Problems    Diagnosis  POA   • BYRON (acute kidney injury) (CMS/[facility]) [N17.9]  Yes   • Acute cerebral infarction (CMS/[facility]) [I63.9]  Yes   • Stroke-like symptom [R29.90]  Yes   • Kidney carcinoma (CMS/[facility]) [C64.9]  Yes   • CKD (chronic kidney disease) stage 3, GFR 30-59 ml/min (CMS/[facility]) [N18.3]  Yes   • Hx of aortic valve replacement, mechanical [Z95.2] [Z95.2]  Not Applicable   • Uncontrolled type 2 diabetes mellitus (CMS/[facility]) [E11.65]  Yes   • Hyperlipidemia [E78.5]  Yes   • Atrial fibrillation (CMS/HCC) [I48.91]  Yes   • Hypertension [I10]  Yes      Resolved Hospital Problems   No resolved problems to display.     84 y.o. male     Assessment and plan:  1.  Acute CVA.  Patient complains of weakness in lower extremities.  Neurology on board.  Follow recommendations.  MRI of the brain, it showed a small cluster of acute white matter infarcts are present within the periventricular white  matter of the right frontal lobe posteriorly.  Continue aspirin and statin therapy. Lipid Panel and hemoglobin A1c reviewed.  Patient is status post ABELARDO study.  2.  Acute on chronic kidney injury.  Renal function is much improved today.  3.  Atrial fibrillation.  Patient is currently rate controlled.  Continue Coumadin.  Continue to monitor INR.  Continue Lovenox to bridge with Coumadin until INR in the therapeutic range.  4.  Diabetes mellitus. Continue Accu-Cheks and use sliding scale insulin coverage.  5.  Hyperlipidemia.  Continue statin therapy.  6.  Further plans based on hospital course, we would continue to monitor.    Fernando Bernal MD   01/16/20  5:00 PM

## 2020-01-16 NOTE — PROGRESS NOTES
Continued Stay Note  [facility]     Patient Name: Francisco Sequeira  MRN: [id number]  Today's Date: 1/16/2020    Admit Date: 1/13/2020    Discharge Plan     Row Name 01/16/20 1540       Plan    Plan  Plans are home.     Plan Comments  Spoke with pt and wife @ bedside, pt feels he does not need rehab.  pt and wife feel he can return home with HH. They also have a walker @ home for pt to use, and wife says they are placing grab bars in the bath.  CCP will cont to follow and offer assist as needed.  Silvia Solano RN/CCP        Discharge Codes    No documentation.             Silvia Solano, RN

## 2020-01-16 NOTE — SIGNIFICANT NOTE
01/16/20 0842   Rehab Treatment   Discipline physical therapist   Reason Treatment Not Performed other (see comments)  (pt leaving floor for ABELARDO this am. will follow up.)   Recommendation   PT - Next Appointment 01/17/20

## 2020-01-16 NOTE — PLAN OF CARE
Problem: Patient Care Overview  Goal: Plan of Care Review  Outcome: Ongoing (interventions implemented as appropriate)  Flowsheets (Taken 1/16/2020 1813)  Progress: improving  Plan of Care Reviewed With: patient   Pt. Had a ABELARDO today and the results were negative. Pt. Is still in A-fib but in a more controlled rate. Pt. Is weak and could use physical therapy. VSS could possibly discharge in the next day or 2.

## 2020-01-16 NOTE — PROGRESS NOTES
"DOS: 2020  NAME: Francisco Sequeira   : 1935  PCP: Rubin Hinkle APRN  Chief Complaint   Patient presents with   • Weakness - Generalized     Patient seen in follow-up today; new to me        Stroke    Subjective: No events overnight. ABELARDO done earlier; tolerated w/o any issues. He denies any new complaints and/or concerns on my exam. Awaiting cardiology recommendations.     Wife at bedside.     Objective:  Vital signs: /60 (BP Location: Right arm, Patient Position: Sitting)   Pulse 67   Temp 97.2 °F (36.2 °C) (Oral)   Resp 16   Ht 182 cm (71.65\")   Wt 76.3 kg (168 lb 3.2 oz)   SpO2 96%   BMI 23.03 kg/m²       HEENT: Normocephalic, atraumatic   COR: RRR  Resp: Even and unlabored  Extremities: Equal pulses, non distal embolization    Neurological:   MS: AO. Language normal. No neglect. Higher integrative function normal  CN: II-XII normal except decreased left shoulder shrug and mild left-sided facial weakness   Motor: 5/5, normal tone on right LUE 4-/5 LLE 4+/5   Reflexes: Toes downgoing   Sensory: Intact  Coordination: Normal    Laboratory results:  Lab Results   Component Value Date    GLUCOSE 167 (H) 2020    CALCIUM 9.4 2020     2020    K 4.8 2020    CO2 21.0 (L) 2020     2020    BUN 30 (H) 2020    CREATININE 1.46 (H) 2020    EGFRIFAFRI 49 (L) 11/15/2019    EGFRIFNONA 46 (L) 2020    BCR 20.5 2020    ANIONGAP 13.0 2020     Lab Results   Component Value Date    WBC 5.54 01/15/2020    HGB 15.1 01/15/2020    HCT 46.0 01/15/2020    MCV 86.0 01/15/2020     (L) 01/15/2020     Lab Results   Component Value Date    CHOL 154 2020     Lab Results   Component Value Date    HDL 34 (L) 2020    HDL 37 (L) 11/15/2019    HDL 34 (L) 2018     Lab Results   Component Value Date    LDL 88 2020    LDL 87 11/15/2019    LDL 93 2018     Lab Results   Component Value Date    TRIG 161 (H) 2020    " TRIG 206 (H) 11/15/2019    TRIG 189 (H) 11/01/2018     Lab Results   Component Value Date    TSH 3.250 01/15/2020     Lab Results   Component Value Date    [id number] 381 01/13/2020     Lab Results   Component Value Date    HGBA1C 7.40 (H) 01/14/2020     Lab Results   Component Value Date    CHOL 154 01/14/2020    CHLPL 165 11/15/2019    CHLPL 165 11/01/2018    CHLPL 178 08/10/2017     Lab Results   Component Value Date    TRIG 161 (H) 01/14/2020    TRIG 206 (H) 11/15/2019    TRIG 189 (H) 11/01/2018     Lab Results   Component Value Date    HDL 34 (L) 01/14/2020    HDL 37 (L) 11/15/2019    HDL 34 (L) 11/01/2018     Lab Results   Component Value Date    LDL 88 01/14/2020    LDL 87 11/15/2019    LDL 93 11/01/2018     Lab Results   Component Value Date    INR 1.59 (H) 01/16/2020    INR 1.80 (H) 01/15/2020    INR 1.99 (H) 01/14/2020    PROTIME 18.6 (H) 01/16/2020    PROTIME 20.6 (H) 01/15/2020    PROTIME 22.2 (H) 01/14/2020       Review and interpretation of imaging:  Interpretation Summary     · Proximal right internal carotid artery plaque without significant stenosis.  · Proximal left internal carotid artery plaque without significant stenosis.        Interpretation Summary     · Left ventricular systolic function is normal. Estimated EF = 60%. Normal left ventricular cavity size noted. All left ventricular wall segments contract normally. Left ventricular wall thickness is consistent with mild-to-moderate concentric hypertrophy. Left ventricular diastolic function not assessed on this study. There is no evidence of a left ventricular mass or thrombus present.  · Left atrial cavity size is severely dilated. There is spontaneous echo contrast present. There is a medium degree of spontaneous echo contrast in the left atrium. The left atrial appendage as been ligated. The doppler velocity in the remaining stump is severely reduced Saline test results are negative.  · Right atrial cavity size is severely dilated. The  inferior vena cava is dilated. Partial IVC inspiratory collapse of less than 50% noted.  · There is a prosthetic aortic valve. No significant aortic valve regurgitation is present. There is a bileaflet tilting disc mechanical valve present. The aortic valve peak and mean gradients are within defined limits. The prosthetic valve is normal.  · Mild-to-moderate mitral valve regurgitation is present. No significant mitral valve stenosis is present. There is mild nodular thickening of the mitral leaflet tips.  · The tricuspid valve is grossly normal. No evidence of tricuspid valve stenosis is present. Moderate tricuspid valve regurgitation is present. Estimated right ventricular systolic pressure from tricuspid regurgitation is mildly elevated (35-45 mmHg). Calculated right ventricular systolic pressure from tricuspid regurgitation is 40 mmHg.        Interpretation Summary     · Estimated EF = 60%.  · Left ventricular systolic function is normal.  · Left ventricular wall thickness is consistent with mild concentric hypertrophy.  · Right ventricular cavity is moderately dilated.  · Mildly reduced right ventricular systolic function noted.  · Right atrial cavity size is moderately dilated.  · Left atrial cavity size is mildly dilated.  · Mild to moderate tricuspid valve regurgitation is present.  · Calculated right ventricular systolic pressure from tricuspid regurgitation is 44.8 mmHg.  · There is a mechanical valve present. The aortic valve peak and mean gradients are within defined limits.        MRI BRAIN WO CONTRAST-     CLINICAL HISTORY: Confusion. History of prostate cancer and kidney  cancer. Lower extremity weakness.     TECHNIQUE: Multiple axial T1, T2, gradient echo and diffusion images  were acquired. Sagittal T1-weighted images were also obtained.      COMPARISON: CT scan of the head dated 01/13/2020     FINDINGS: There is mild diffuse cortical atrophy. There are several  small nodular foci of T2  hyperintensity in the periventricular white  matter of both cerebral hemispheres, most of which show no corresponding  restricted diffusion, and are consistent with sequela of mild small  vessel chronic ischemic change. However, there is a cluster of  approximately 3 small foci of restricted diffusion in the  periventricular white matter of the posterior aspect of the right  frontal lobe that are consistent with acute white matter infarcts. These  measure approximately 1.7 x 0.7 cm in aggregate size. There is no  significant associated mass effect. No cortical infarcts are identified.  There are no masses. Normal flow voids are observed in the major  vascular structures. The paranasal sinuses appear well aerated.     IMPRESSION:  Mild cortical atrophy and evidence of mild small vessel  chronic ischemic change as described. A small cluster of acute white  matter infarcts are present within the periventricular white matter of  the right frontal lobe posteriorly. Otherwise unremarkable MRI of the  brain.     This report was finalized on 1/14/2020 11:26 AM by Dr. Francisco Zee M.D.  CT HEAD     HISTORY:Lower extremity weakness, left greater than right     TECHNIQUE: CT scan of the head was obtained with 3 mm axial images  without intravenous contrast.  Radiation dose reduction techniques were  utilized, including automated exposure control and exposure modulation  based on body size.     COMPARISON:CT head 01/13/2016     FINDINGS:  There is no finding of acute infarct, hemorrhage, contusion or abnormal  extra-axial collection. No hydrocephalus is present. Focal subcutaneous  soft tissue thickening over the right parietal calvarium, possibly  representing a resolving scalp contusion.     IMPRESSION:  1.  No findings of acute intracranial abnormality.  2.  Other findings as above.        This report was finalized on 1/13/2020 8:36 AM by Dr. Antonio Hart M.D.  XR CHEST 1 VW-  01/13/2020     HISTORY: Weakness.  Shortness of breath.     Heart size is mildly enlarged. Lungs appear free of acute infiltrates.  Sternotomy wires are seen. Bony structures appear unremarkable.     IMPRESSION:  Mild cardiomegaly.  2. Lungs appear clear.     This report was finalized on 1/13/2020 8:02 AM by Dr. Onel Capps M.D.       Impression:This is an 85 yo male w/ history of DM, HTN, hyperlipidemia, AF , cardiomyopathy, aortic valve replacement w/ mechanical aortic valve (on Coumadin PTA) who presented to hospital 01/13 w/ complaint of left-sided weakness and gait disturbance for which our service was consulted. Initial CTH negative for stroke; evidence of scalp contusion noted over right parietal region. Other w/u below.     Neurologically, unchanged; residual left-sided weakness still present.  ABELARDO today unrevealing of cardiac source. Awaiting cardiology notes for recommendations. No further neurology workup indicated. No further neurology workup indicated. We will sign off and see again upon request.          Work up to date:  1/13 CT head WO: No acute findings, scalp contusion over the right parietal.  1/13 2D echo: Normal LA volume, mildly dilated LA size, saline test negative, no evidence of PFO, LV function normal, EF 60%, prosthetic valve is normal.  1/14 MRI brain WO: 3 small acute right frontal lobe infarcts, mild small vessel disease  01/13: Carotid Duplex: Bilateral ICA plaque w/o significant stenosis   1/16 ABELARDO: EF 60%. LV Normal. LA  severely dilated. No cardiac source for CVA found.   Labs: TSH 3.25, B-12 381, A1c 7.40, LDL 88, INR 1.59 today CRP 0.30, U/A 2+ bacteria, small leuks;       Plan:  ASA 325mg daily; ok w/ 81mg daily given not on ASA PTA arrival and BMI < 30 along w/ Coumadin   Lovenox to Coumadin as written ; goal INR 2-3   Lipitor 40mg daily (LDL 88)  Neurochecks  BP control  Stroke Education  RADHA/SCDs  PT/OT/ST  Follow-up in stroke clinic in 3 months; sooner if indicated.     Case reviewed w/ Dr. Nevarez and  he agrees with plan above.     Princess He, APRN

## 2020-01-17 LAB
ANION GAP SERPL CALCULATED.3IONS-SCNC: 12.1 MMOL/L (ref 5–15)
BASOPHILS # BLD AUTO: 0.03 10*3/MM3 (ref 0–0.2)
BASOPHILS NFR BLD AUTO: 0.5 % (ref 0–1.5)
BUN BLD-MCNC: 33 MG/DL (ref 8–23)
BUN/CREAT SERPL: 23.2 (ref 7–25)
CALCIUM SPEC-SCNC: 9.1 MG/DL (ref 8.6–10.5)
CHLORIDE SERPL-SCNC: 104 MMOL/L (ref 98–107)
CO2 SERPL-SCNC: 23.9 MMOL/L (ref 22–29)
CREAT BLD-MCNC: 1.42 MG/DL (ref 0.76–1.27)
DEPRECATED RDW RBC AUTO: 44.3 FL (ref 37–54)
EOSINOPHIL # BLD AUTO: 0.09 10*3/MM3 (ref 0–0.4)
EOSINOPHIL NFR BLD AUTO: 1.5 % (ref 0.3–6.2)
ERYTHROCYTE [DISTWIDTH] IN BLOOD BY AUTOMATED COUNT: 13.9 % (ref 12.3–15.4)
GFR SERPL CREATININE-BSD FRML MDRD: 48 ML/MIN/1.73
GLUCOSE BLD-MCNC: 210 MG/DL (ref 65–99)
GLUCOSE BLDC GLUCOMTR-MCNC: 187 MG/DL (ref 70–130)
GLUCOSE BLDC GLUCOMTR-MCNC: 233 MG/DL (ref 70–130)
GLUCOSE BLDC GLUCOMTR-MCNC: 244 MG/DL (ref 70–130)
GLUCOSE BLDC GLUCOMTR-MCNC: 279 MG/DL (ref 70–130)
HCT VFR BLD AUTO: 47 % (ref 37.5–51)
HGB BLD-MCNC: 15.4 G/DL (ref 13–17.7)
IMM GRANULOCYTES # BLD AUTO: 0.02 10*3/MM3 (ref 0–0.05)
IMM GRANULOCYTES NFR BLD AUTO: 0.3 % (ref 0–0.5)
INR PPP: 1.5 (ref 0.9–1.1)
LYMPHOCYTES # BLD AUTO: 0.92 10*3/MM3 (ref 0.7–3.1)
LYMPHOCYTES NFR BLD AUTO: 15.3 % (ref 19.6–45.3)
MCH RBC QN AUTO: 28.7 PG (ref 26.6–33)
MCHC RBC AUTO-ENTMCNC: 32.8 G/DL (ref 31.5–35.7)
MCV RBC AUTO: 87.7 FL (ref 79–97)
MONOCYTES # BLD AUTO: 0.42 10*3/MM3 (ref 0.1–0.9)
MONOCYTES NFR BLD AUTO: 7 % (ref 5–12)
NEUTROPHILS # BLD AUTO: 4.53 10*3/MM3 (ref 1.7–7)
NEUTROPHILS NFR BLD AUTO: 75.4 % (ref 42.7–76)
NRBC BLD AUTO-RTO: 0 /100 WBC (ref 0–0.2)
PLATELET # BLD AUTO: 109 10*3/MM3 (ref 140–450)
PMV BLD AUTO: 12.2 FL (ref 6–12)
POTASSIUM BLD-SCNC: 4.7 MMOL/L (ref 3.5–5.2)
PROTHROMBIN TIME: 17.8 SECONDS (ref 11.7–14.2)
RBC # BLD AUTO: 5.36 10*6/MM3 (ref 4.14–5.8)
SODIUM BLD-SCNC: 140 MMOL/L (ref 136–145)
WBC NRBC COR # BLD: 6.01 10*3/MM3 (ref 3.4–10.8)

## 2020-01-17 PROCEDURE — 97110 THERAPEUTIC EXERCISES: CPT

## 2020-01-17 PROCEDURE — 99232 SBSQ HOSP IP/OBS MODERATE 35: CPT | Performed by: INTERNAL MEDICINE

## 2020-01-17 PROCEDURE — 82962 GLUCOSE BLOOD TEST: CPT

## 2020-01-17 PROCEDURE — 97112 NEUROMUSCULAR REEDUCATION: CPT

## 2020-01-17 PROCEDURE — 97110 THERAPEUTIC EXERCISES: CPT | Performed by: PHYSICAL THERAPIST

## 2020-01-17 PROCEDURE — 63710000001 INSULIN LISPRO (HUMAN) PER 5 UNITS: Performed by: NURSE PRACTITIONER

## 2020-01-17 PROCEDURE — 85025 COMPLETE CBC W/AUTO DIFF WBC: CPT | Performed by: INTERNAL MEDICINE

## 2020-01-17 PROCEDURE — 36415 COLL VENOUS BLD VENIPUNCTURE: CPT | Performed by: NURSE PRACTITIONER

## 2020-01-17 PROCEDURE — 25010000002 ENOXAPARIN PER 10 MG: Performed by: PSYCHIATRY & NEUROLOGY

## 2020-01-17 PROCEDURE — 80048 BASIC METABOLIC PNL TOTAL CA: CPT | Performed by: NURSE PRACTITIONER

## 2020-01-17 PROCEDURE — 85610 PROTHROMBIN TIME: CPT | Performed by: NURSE PRACTITIONER

## 2020-01-17 RX ORDER — WARFARIN SODIUM 10 MG/1
10 TABLET ORAL
Status: COMPLETED | OUTPATIENT
Start: 2020-01-17 | End: 2020-01-17

## 2020-01-17 RX ADMIN — SODIUM CHLORIDE, PRESERVATIVE FREE 10 ML: 5 INJECTION INTRAVENOUS at 21:19

## 2020-01-17 RX ADMIN — SODIUM BICARBONATE 1950 MG: 650 TABLET ORAL at 08:51

## 2020-01-17 RX ADMIN — Medication 400 MG: at 08:51

## 2020-01-17 RX ADMIN — SODIUM CHLORIDE, PRESERVATIVE FREE 10 ML: 5 INJECTION INTRAVENOUS at 08:51

## 2020-01-17 RX ADMIN — ENOXAPARIN SODIUM 80 MG: 80 INJECTION SUBCUTANEOUS at 17:24

## 2020-01-17 RX ADMIN — GUAIFENESIN 600 MG: 600 TABLET, EXTENDED RELEASE ORAL at 08:51

## 2020-01-17 RX ADMIN — INSULIN LISPRO 2 UNITS: 100 INJECTION, SOLUTION INTRAVENOUS; SUBCUTANEOUS at 08:49

## 2020-01-17 RX ADMIN — ENOXAPARIN SODIUM 80 MG: 80 INJECTION SUBCUTANEOUS at 08:51

## 2020-01-17 RX ADMIN — SODIUM BICARBONATE 1950 MG: 650 TABLET ORAL at 21:17

## 2020-01-17 RX ADMIN — DIGOXIN 125 MCG: 125 TABLET ORAL at 08:50

## 2020-01-17 RX ADMIN — WARFARIN SODIUM 10 MG: 10 TABLET ORAL at 17:24

## 2020-01-17 RX ADMIN — ASPIRIN 325 MG: 325 TABLET ORAL at 08:51

## 2020-01-17 RX ADMIN — GUAIFENESIN 600 MG: 600 TABLET, EXTENDED RELEASE ORAL at 21:16

## 2020-01-17 RX ADMIN — METOPROLOL SUCCINATE 25 MG: 25 TABLET, EXTENDED RELEASE ORAL at 08:51

## 2020-01-17 RX ADMIN — ATORVASTATIN CALCIUM 40 MG: 20 TABLET, FILM COATED ORAL at 08:51

## 2020-01-17 RX ADMIN — INSULIN LISPRO 3 UNITS: 100 INJECTION, SOLUTION INTRAVENOUS; SUBCUTANEOUS at 12:26

## 2020-01-17 RX ADMIN — INSULIN LISPRO 3 UNITS: 100 INJECTION, SOLUTION INTRAVENOUS; SUBCUTANEOUS at 17:24

## 2020-01-17 RX ADMIN — INSULIN LISPRO 4 UNITS: 100 INJECTION, SOLUTION INTRAVENOUS; SUBCUTANEOUS at 21:16

## 2020-01-17 NOTE — PLAN OF CARE
Problem: Patient Care Overview  Goal: Plan of Care Review  Flowsheets (Taken 1/17/2020 6200)  Progress: improving  Plan of Care Reviewed With: patient; spouse   Pt. Is doing well with therapy. Pt and wife both agreed that they want him home with home health at discharge. Waiting on INR to be 3 or greater per cardiology before ready for discharge.

## 2020-01-17 NOTE — PLAN OF CARE
Problem: Patient Care Overview  Goal: Plan of Care Review  Flowsheets (Taken 1/17/2020 1421)  Plan of Care Reviewed With: patient; spouse  Outcome Summary: Pt and spouse agrees to OT.  Instructed pt/spouse with LUE coordination and strength activity to perform on own. Pt continues with decreased control with LUE which limits safety and independence for adls

## 2020-01-17 NOTE — NURSING NOTE
Referral received through stroke screening order set.  Noted OT documentation that transfers required min A.  Discussed with CCP--states family plan is to take him home with 24/7 assistance---advised of OT notes and that hands on assistance would be needed.

## 2020-01-17 NOTE — THERAPY TREATMENT NOTE
Patient Name: Francisco Sequeira  : 1935    MRN: [id number]                              Today's Date: 2020       Admit Date: 2020    Visit Dx:     ICD-10-CM ICD-9-CM   1. Stroke-like symptom R29.90 781.99   2. H/O mechanical aortic valve replacement Z95.2 V43.3   3. Current use of long term anticoagulation Z79.01 V58.61   4. Subtherapeutic international normalized ratio (INR) R79.1 790.92   5. Acute kidney injury (CMS/HCC) N17.9 584.9     Patient Active Problem List   Diagnosis   • Atrial fibrillation (CMS/HCC)   • Hypertension   • Atopic rhinitis   • Uncontrolled type 2 diabetes mellitus (CMS/HCC)   • Gastroesophageal reflux disease   • Hyperlipidemia   • Renal insufficiency   • Low testosterone   • Special screening for malignant neoplasm of prostate   • Hx of aortic valve replacement, mechanical [Z95.2]   • Screening for iron deficiency anemia   • Stroke-like symptom   • Kidney carcinoma (CMS/HCC)   • CKD (chronic kidney disease) stage 3, GFR 30-59 ml/min (CMS/HCC)   • BYRON (acute kidney injury) (CMS/HCC)   • Acute cerebral infarction (CMS/HCC)     Past Medical History:   Diagnosis Date   • Abnormal electrocardiogram    • Allergic rhinitis    • Aortic valve insufficiency    • Ascending aortic aneurysm (CMS/HCC)    • Atrial fibrillation (CMS/HCC)    • Bacteremia    • Calcific aortic stenosis of bicuspid valve    • Cardiac arrest (CMS/HCC)    • Cardiomyopathy (CMS/HCC)    • Contact dermatitis due to poison ivy    • Diabetes mellitus (CMS/HCC)    • Elbow fracture    • Esophageal reflux    • GERD (gastroesophageal reflux disease)    • Head injury    • Hyperglycemia    • Hyperlipidemia    • Hypertension    • Kidney carcinoma (CMS/HCC)    • Leg swelling    • Localized swelling of both lower legs    • Nasal congestion    • Nasal drainage    • Nonischemic cardiomyopathy (CMS/HCC)    • Palpitations    • Pedal edema    • Pleural effusion on left    • Renal insufficiency syndrome    • Renal oncocytoma    •  Seasonal allergic reaction    • Sinusitis    • Syncope    • Type 2 diabetes mellitus (CMS/HCC)     uncontrolled   • Vision changes    • Visual field defect      Past Surgical History:   Procedure Laterality Date   • AORTIC VALVE REPAIR/REPLACEMENT     • ASCENDING AORTIC ANEURYSM REPAIR W/ MECHANICAL AORTIC VALVE REPLACEMENT     • NEPHRECTOMY     • OTHER SURGICAL HISTORY      elbow surgery   • PROSTATE SURGERY     • THORACENTESIS Left     diagnostic     General Information     Row Name 01/17/20 1620          PT Evaluation Time/Intention    Document Type  therapy note (daily note)  -     Mode of Treatment  individual therapy;physical therapy  -       User Key  (r) = Recorded By, (t) = Taken By, (c) = Cosigned By    Initials Name Provider Type    Emi Palafox, PT Physical Therapist        Mobility     Row Name 01/17/20 1620          Bed Mobility Assessment/Treatment    Comment (Bed Mobility)  UIC  -     Row Name 01/17/20 1620          Sit-Stand Transfer    Sit-Stand Tully (Transfers)  contact guard  -     Assistive Device (Sit-Stand Transfers)  walker, front-wheeled  -     Row Name 01/17/20 1620          Gait/Stairs Assessment/Training    Tully Level (Gait)  contact guard  -     Assistive Device (Gait)  walker, front-wheeled  -     Distance in Feet (Gait)  150  -KH     Deviations/Abnormal Patterns (Gait)  ataxic;gait speed decreased  -KH     Left Sided Gait Deviations  foot drop/toe drag;heel strike decreased;knee hyperextension  -     Comment (Gait/Stairs)  increased difficulty clearing L foot as pt fatigued, persistant hyperextension  -       User Key  (r) = Recorded By, (t) = Taken By, (c) = Cosigned By    Initials Name Provider Type    Emi Palafox, PT Physical Therapist        Obj/Interventions     Row Name 01/17/20 1622          Static Sitting Balance    Comment (Unsupported Sitting, Static Balance)  standing minisquats, heel raises x 10 reps, seated LAQ  x 10 reps  -     Row Name 01/17/20 1622          Dynamic Standing Balance    Level of [place], Reaches Outside Midline (Standing, Dynamic Balance)  contact guard assist  -SITA     Time Able to Maintain Position, Reaches Outside Midline (Standing, Dynamic Balance)  45 to 60 seconds  -SITA     Comment, Reaches Outside Midline (Standing, Dynamic Balance)  reaching outside KACY and with perturbations  -       User Key  (r) = Recorded By, (t) = Taken By, (c) = Cosigned By    Initials Name Provider Type    Emi Palafox, PT Physical Therapist        Goals/Plan    No documentation.       Clinical Impression     Row Name 01/17/20 1623          Pain Assessment    Additional Documentation  Pain Scale: Numbers Pre/Post-Treatment (Group)  -     Row Name 01/17/20 1623          Pain Scale: Numbers Pre/Post-Treatment    Pain Scale: Numbers, Pretreatment  0/10 - no pain  -SITA     Pain Scale: Numbers, Post-Treatment  0/10 - no pain  -     Row Name 01/17/20 1623          Positioning and Restraints    Pre-Treatment Position  sitting in chair/recliner no alarm  -SITA     Post Treatment Position  chair  -     In Chair  sitting;call light within reach;encouraged to call for assist;with family/caregiver  -SITA       User Key  (r) = Recorded By, (t) = Taken By, (c) = Cosigned By    Initials Name Provider Type    Emi Palafox, PT Physical Therapist        Outcome Measures     Row Name 01/17/20 1624          How much help from another person do you currently need...    Turning from your back to your side while in flat bed without using bedrails?  4  -KH     Moving from lying on back to sitting on the side of a flat bed without bedrails?  3  -KH     Moving to and from a bed to a chair (including a wheelchair)?  3  -KH     Standing up from a chair using your arms (e.g., wheelchair, bedside chair)?  3  -KH     Climbing 3-5 steps with a railing?  2  -KH     To walk in hospital room?  3  -KH     AM-PAC 6 Clicks  Score (PT)  18  -     Row Name 01/17/20 1624          Functional Assessment    Outcome Measure Options  AM-PAC 6 Clicks Basic Mobility (PT)  -       User Key  (r) = Recorded By, (t) = Taken By, (c) = Cosigned By    Initials Name Provider Type    Emi Palafox PT Physical Therapist          PT Recommendation and Plan     Outcome Summary: Pt was agreeable to therapy and reports he as been ambulating with his wife in the hallway. Remains slightly ataxic on L with L foot drop and L knee hyperextension. Pt ambulated 150 ft with CGA and tolerated standing exercises and balance exercises.     Time Calculation:   PT Charges     Row Name 01/17/20 1627             Time Calculation    Start Time  1600  -      Stop Time  1618  -      Time Calculation (min)  18 min  -         Time Calculation- PT    Total Timed Code Minutes- PT  18 minute(s)  -        User Key  (r) = Recorded By, (t) = Taken By, (c) = Cosigned By    Initials Name Provider Type    Emi Palafox, CICI Physical Therapist        Therapy Charges for Today     Code Description Service Date Service Provider Modifiers Qty    [id number]  PT THER PROC EA 15 MIN 1/17/2020 Emi Pulido, PT GP 1          PT G-Codes  Outcome Measure Options: AM-PAC 6 Clicks Basic Mobility (PT)  AM-PAC 6 Clicks Score (PT): 18  AM-PAC 6 Clicks Score (OT): 18  Modified Edwige Scale: 4 - Moderately severe disability.  Unable to walk without assistance, and unable to attend to own bodily needs without assistance.    Emi Pulido, CICI  1/17/2020

## 2020-01-17 NOTE — PLAN OF CARE
Problem: Patient Care Overview  Goal: Plan of Care Review  Flowsheets (Taken 1/17/2020 6083)  Outcome Summary: Pt was agreeable to therapy and reports he as been ambulating with his wife in the hallway. Remains slightly ataxic on L with L foot drop and L knee hyperextension. Pt ambulated 150 ft with CGA and tolerated standing exercises and balance exercises.

## 2020-01-17 NOTE — PROGRESS NOTES
Pharmacy Consult: Warfarin Dosing/ Monitoring    Francisco Sequeira is a 84 y.o. male, estimated creatinine clearance is 43.7 mL/min (A) (by C-G formula based on SCr of 1.42 mg/dL (H)). weighing 77.1 kg (169 lb 14.4 oz).     has a past medical history of Abnormal electrocardiogram, Allergic rhinitis, Aortic valve insufficiency, Ascending aortic aneurysm (CMS/HCC), Atrial fibrillation (CMS/HCC), Bacteremia, Calcific aortic stenosis of bicuspid valve, Cardiac arrest (CMS/HCC), Cardiomyopathy (CMS/HCC), Contact dermatitis due to poison ivy, Diabetes mellitus (CMS/HCC), Elbow fracture, Esophageal reflux, GERD (gastroesophageal reflux disease), Head injury, Hyperglycemia, Hyperlipidemia, Hypertension, Kidney carcinoma (CMS/HCC), Leg swelling, Localized swelling of both lower legs, Nasal congestion, Nasal drainage, Nonischemic cardiomyopathy (CMS/HCC), Palpitations, Pedal edema, Pleural effusion on left, Renal insufficiency syndrome, Renal oncocytoma, Seasonal allergic reaction, Sinusitis, Syncope, Type 2 diabetes mellitus (CMS/HCC), Vision changes, and Visual field defect.    Social History     Tobacco Use    Smoking status: Former Smoker    Smokeless tobacco: Former User    Tobacco comment: caffeine use   Substance Use Topics    Alcohol use: Yes     Comment: occasional    Drug use: No       Results from last 7 days   Lab Units 01/17/20  0539 01/16/20  0559 01/15/20  0646 01/14/20  0516 01/13/20  0747   INR  1.50* 1.59* 1.80* 1.99* 2.21*   HEMOGLOBIN g/dL 15.4  --  15.1 15.6 14.8   HEMATOCRIT % 47.0  --  46.0 47.5 45.2   PLATELETS 10*3/mm3 109*  --  101* 98* 109*     Results from last 7 days   Lab Units 01/17/20  0539 01/16/20  0559 01/15/20  0646  01/13/20  0747   SODIUM mmol/L 140 138 137   < > 143   POTASSIUM mmol/L 4.7 4.8 4.8   < > 4.4   CHLORIDE mmol/L 104 104 104   < > 107   CO2 mmol/L 23.9 21.0* 22.2   < > 25.2   BUN mg/dL 33* 30* 25*   < > 29*   CREATININE mg/dL 1.42* 1.46* 1.42*   < > 2.04*   CALCIUM mg/dL 9.1  9.4 9.0   < > 8.9   BILIRUBIN mg/dL  --   --   --   --  0.5   ALK PHOS U/L  --   --   --   --  80   ALT (SGPT) U/L  --   --   --   --  20   AST (SGOT) U/L  --   --   --   --  18   GLUCOSE mg/dL 210* 167* 131*   < > 68    < > = values in this interval not displayed.     Anticoagulation history: Per last note from [facility] on 12/30, patient's home warfarin regimen is 5 mg qMWF and 7.5 mg TThSSun.     Hospital Anticoagulation:  Consulting provider: LIZA Zayas  Start date: 1/13/20  Indication: Aortic mechanical valve  Target INR: 2.5-3.5  Expected duration: Lifelong  Bridge Therapy: Yes              and enoxapain  Date 1/13 1/14 1/15 1/16 1/17        INR 2.21 1.99 1.80 1.59 1.5        Warfarin dose  7.5mg 7.5mg 7.5mg 10mg          Potential drug interactions:     Relevant nutrition status: reg cardiac diet    Other: looks like dose on 1/13 was never given    Education complete?/ Date: no    Assessment/Plan:  Dose will give warfarin 10 mg today, inr still sub therapeutic   Monitor INR daily  Follow up daily    Pharmacy will continue to follow until discharge or discontinuation of warfarin.   Thuy Leach, [facility]

## 2020-01-17 NOTE — THERAPY TREATMENT NOTE
Acute Care - Occupational Therapy Progress Note  [facility]     Patient Name: Francisco Sequeira  : 1935  MRN: [id number]  Today's Date: 2020             Admit Date: 2020       ICD-10-CM ICD-9-CM   1. Stroke-like symptom R29.90 781.99   2. H/O mechanical aortic valve replacement Z95.2 V43.3   3. Current use of long term anticoagulation Z79.01 V58.61   4. Subtherapeutic international normalized ratio (INR) R79.1 790.92   5. Acute kidney injury (CMS/HCC) N17.9 584.9     Patient Active Problem List   Diagnosis   • Atrial fibrillation (CMS/HCC)   • Hypertension   • Atopic rhinitis   • Uncontrolled type 2 diabetes mellitus (CMS/HCC)   • Gastroesophageal reflux disease   • Hyperlipidemia   • Renal insufficiency   • Low testosterone   • Special screening for malignant neoplasm of prostate   • Hx of aortic valve replacement, mechanical [Z95.2]   • Screening for iron deficiency anemia   • Stroke-like symptom   • Kidney carcinoma (CMS/HCC)   • CKD (chronic kidney disease) stage 3, GFR 30-59 ml/min (CMS/HCC)   • BYRON (acute kidney injury) (CMS/HCC)   • Acute cerebral infarction (CMS/HCC)     Past Medical History:   Diagnosis Date   • Abnormal electrocardiogram    • Allergic rhinitis    • Aortic valve insufficiency    • Ascending aortic aneurysm (CMS/HCC)    • Atrial fibrillation (CMS/HCC)    • Bacteremia    • Calcific aortic stenosis of bicuspid valve    • Cardiac arrest (CMS/HCC)    • Cardiomyopathy (CMS/HCC)    • Contact dermatitis due to poison ivy    • Diabetes mellitus (CMS/HCC)    • Elbow fracture    • Esophageal reflux    • GERD (gastroesophageal reflux disease)    • Head injury    • Hyperglycemia    • Hyperlipidemia    • Hypertension    • Kidney carcinoma (CMS/HCC)    • Leg swelling    • Localized swelling of both lower legs    • Nasal congestion    • Nasal drainage    • Nonischemic cardiomyopathy (CMS/HCC)    • Palpitations    • Pedal edema    • Pleural effusion on left    • Renal insufficiency  syndrome    • Renal oncocytoma    • Seasonal allergic reaction    • Sinusitis    • Syncope    • Type 2 diabetes mellitus (CMS/HCC)     uncontrolled   • Vision changes    • Visual field defect      Past Surgical History:   Procedure Laterality Date   • AORTIC VALVE REPAIR/REPLACEMENT     • ASCENDING AORTIC ANEURYSM REPAIR W/ MECHANICAL AORTIC VALVE REPLACEMENT     • NEPHRECTOMY     • OTHER SURGICAL HISTORY      elbow surgery   • PROSTATE SURGERY     • THORACENTESIS Left     diagnostic       Therapy Treatment    Rehabilitation Treatment Summary     Row Name 01/17/20 1417             Treatment Time/Intention    Discipline  occupational therapist  -SG      Document Type  therapy note (daily note)  -SG      Mode of Treatment  individual therapy;occupational therapy  -SG      Patient Effort  good  -SG      Existing Precautions/Restrictions  fall  -SG      Recorded by [SG] Erlinda Gerard, OT 01/17/20 1420      Row Name 01/17/20 1417             Cognitive Assessment/Intervention- PT/OT    Orientation Status (Cognition)  oriented x 3  -SG      Follows Commands (Cognition)  follows one step commands  -SG      Recorded by [SG] Erlinda Gerard, OTR 01/17/20 1420      Row Name 01/17/20 1417             Sit-Stand Transfer    Sit-Stand [place] (Transfers)  minimum assist (75% patient effort)  -SG      Assistive Device (Sit-Stand Transfers)  walker, front-wheeled  -SG      Recorded by [SG] Erlinda Gerard, OTR 01/17/20 1420      Row Name 01/17/20 1417             Stand-Sit Transfer    Stand-Sit [place] (Transfers)  minimum assist (75% patient effort)  -SG      Assistive Device (Stand-Sit Transfers)  walker, front-wheeled  -SG      Recorded by [SG] Erlinda Gerard, OTR 01/17/20 1420      Row Name 01/17/20 1417             Motor Skills Assessment/Interventions    Additional Documentation  Therapeutic Exercise (Group);Neuromuscular Re-education (Group);Therapeutic Exercise Interventions (Group)  -SG      Recorded by  [SG] Erlinda Gerard, OTR 01/17/20 1420      Row Name 01/17/20 1417             Therapeutic Exercise    Upper Extremity Range of Motion (Therapeutic Exercise)  shoulder flexion/extension, left;elbow flexion/extension, left;forearm supination/pronation, left;wrist flexion/extension, left  -SG      Hand (Therapeutic Exercise)  finger flexion/extension, left;hand , left;thumb finger opposition, left  -SG      Exercise Type (Therapeutic Exercise)  AROM (active range of motion)  -SG      Position (Therapeutic Exercise)  seated  -SG      Expected Outcome (Therapeutic Exercise)  improve motor control;improve neuromuscular control;improve performance, BADLs  -SG      Recorded by [SG] Erlinda Gerard, OTR 01/17/20 1420      Row Name 01/17/20 1417             Neuromuscular Re-education    Comment, Neuromuscular Re-education  Instructed pt and family with LUE coordination , fernando coordination tasks and placement activities   -SG      Recorded by [SG] Erlinda Gerard OTR 01/17/20 1420      Row Name 01/17/20 1417             Positioning and Restraints    Pre-Treatment Position  sitting in chair/recliner  -SG      Post Treatment Position  chair  -SG      In Chair  sitting;call light within reach;encouraged to call for assist;with family/caregiver  -SG      Recorded by [SG] Erlinda Gerard OTR 01/17/20 1420      Row Name 01/17/20 1417             Pain Scale: Numbers Pre/Post-Treatment    Pain Scale: Numbers, Pretreatment  0/10 - no pain  -SG      Pain Scale: Numbers, Post-Treatment  0/10 - no pain  -SG      Recorded by [SG] Erlinda Gerard, OTR 01/17/20 1420        User Key  (r) = Recorded By, (t) = Taken By, (c) = Cosigned By    Initials Name Effective Dates Discipline     Erlinda Gerard, OTR 06/08/18 -  OT                 OT Recommendation and Plan  Planned Therapy Interventions (OT Eval): BADL retraining, neuromuscular control/coordination retraining, transfer/mobility retraining  Therapy Frequency (OT Eval): 5  times/wk  Plan of Care Review  Plan of Care Reviewed With: patient, spouse  Plan of Care Reviewed With: patient, spouse  Outcome Summary: Pt and spouse agrees to OT.  Instructed pt/spouse with LUE coordination and strength activity to perform on own. Pt continues with decreased control with LUE which limits safety and independence for adls  Outcome Measures     Row Name 01/17/20 1422 01/15/20 1330 01/14/20 1502       How much help from another is currently needed...    Putting on and taking off regular lower body clothing?  3  -SG  3  -SG  2  -SG    Bathing (including washing, rinsing, and drying)  3  -SG  3  -SG  2  -SG    Toileting (which includes using toilet bed pan or urinal)  3  -SG  3  -SG  3  -SG    Putting on and taking off regular upper body clothing  3  -SG  3  -SG  3  -SG    Taking care of personal grooming (such as brushing teeth)  3  -SG  3  -SG  3  -SG    Eating meals  3  -SG  3  -SG  3  -SG    AM-PAC 6 Clicks Score (OT)  18  -SG  18  -SG  16  -SG       Modified [place] Scale    Modified [place] Scale  --  --  4 - Moderately severe disability.  Unable to walk without assistance, and unable to attend to own bodily needs without assistance.  -SG       Functional Assessment    Outcome Measure Options  --  --  AM-PAC 6 Clicks Daily Activity (OT)  -      User Key  (r) = Recorded By, (t) = Taken By, (c) = Cosigned By    Initials Name Provider Type    Erlinda Rubio OTR Occupational Therapist           Time Calculation:   Time Calculation- OT     Row Name 01/17/20 1422             Time Calculation- OT    OT Start Time  0953  -      OT Stop Time  1016  -      OT Time Calculation (min)  23 min  -      Total Timed Code Minutes- OT  23 minute(s)  -      OT Received On  01/17/20  -        User Key  (r) = Recorded By, (t) = Taken By, (c) = Cosigned By    Initials Name Provider Type    Erlinda Rubio OTR Occupational Therapist        Therapy Charges for Today     Code Description Service Date  Service Provider Modifiers Qty    23122402409 HC OT THER PROC EA 15 MIN 1/17/2020 Erlinda Gerard, OTR GO 1    06865629152 HC OT NEUROMUSC RE EDUCATION EA 15 MIN 1/17/2020 rElinda Gerard OTR GO 1               Erlinda Gerard, POLA  1/17/2020

## 2020-01-17 NOTE — PROGRESS NOTES
"DAILY PROGRESS NOTE  [facility]    Patient Identification:  Name: Francisco Sequeira  Age: 84 y.o.  Sex: male  :  1935  MRN: [id number]         Primary Care Physician: Rubin Hinkle APRN    Subjective:  Interval History:He feels better.    Objective:    Scheduled Meds:  aspirin 325 mg Oral Daily   Or      aspirin 300 mg Rectal Daily   atorvastatin 40 mg Oral Daily   digoxin 125 mcg Oral Daily   enoxaparin 1 mg/kg Subcutaneous Q12H   guaiFENesin 600 mg Oral Q12H   insulin lispro 0-7 Units Subcutaneous 4x Daily With Meals & Nightly   magnesium oxide 400 mg Oral Daily   metoprolol succinate XL 25 mg Oral Daily   sodium bicarbonate 1,950 mg Oral BID   sodium chloride 10 mL Intravenous Q12H   warfarin 10 mg Oral Once     Continuous Infusions:  Pharmacy to dose warfarin        Vital signs in last 24 hours:  Temp:  [97.1 °F (36.2 °C)-98.5 °F (36.9 °C)] 97.6 °F (36.4 °C)  Heart Rate:  [68-87] 78  Resp:  [16-18] 18  BP: (116-156)/(60-78) 126/66    Intake/Output:    Intake/Output Summary (Last 24 hours) at 2020 1521  Last data filed at 2020 1247  Gross per 24 hour   Intake 480 ml   Output 425 ml   Net 55 ml       Exam:  /66 (BP Location: Right arm, Patient Position: Sitting)   Pulse 78   Temp 97.6 °F (36.4 °C) (Oral)   Resp 18   Ht 182 cm (71.65\")   Wt 78.9 kg (174 lb)   SpO2 98%   BMI 23.83 kg/m²     General Appearance:    Alert, cooperative, no distress   Head:    Normocephalic, without obvious abnormality, atraumatic   Eyes:       Throat:   Lips, tongue, gums normal   Neck:   Supple, symmetrical, trachea midline, no JVD   Lungs:     Clear to auscultation bilaterally, respirations unlabored   Chest Wall:    No tenderness or deformity    Heart:    Regular rate and rhythm, S1 and S2 normal, no murmur,no  Rub or gallop   Abdomen:     Soft, non-tender, bowel sounds active, no masses, no organomegaly    Extremities:   Extremities normal, atraumatic, no cyanosis or edema   Pulses:   "    Skin:   Skin is warm and dry,  no rashes or palpable lesions   Neurologic:   no focal deficits noted      Lab Results (last 72 hours)     Procedure Component Value Units Date/Time    POC Glucose Once [[id number]]  (Abnormal) Collected:  01/17/20 1056    Specimen:  Blood Updated:  01/17/20 1057     Glucose 233 mg/dL     POC Glucose Once [[id number]]  (Abnormal) Collected:  01/17/20 0621    Specimen:  Blood Updated:  01/17/20 0623     Glucose 187 mg/dL     Basic Metabolic Panel [[id number]]  (Abnormal) Collected:  01/17/20 0539    Specimen:  Blood Updated:  01/17/20 0618     Glucose 210 mg/dL      BUN 33 mg/dL      Creatinine 1.42 mg/dL      Sodium 140 mmol/L      Potassium 4.7 mmol/L      Chloride 104 mmol/L      CO2 23.9 mmol/L      Calcium 9.1 mg/dL      eGFR Non African Amer 48 mL/min/1.73      BUN/Creatinine Ratio 23.2     Anion Gap 12.1 mmol/L     Narrative:       GFR Normal >60  Chronic Kidney Disease <60  Kidney Failure <15      Protime-INR [[id number]]  (Abnormal) Collected:  01/17/20 0539    Specimen:  Blood Updated:  01/17/20 0604     Protime 17.8 Seconds      INR 1.50    CBC & Differential [[id number]] Collected:  01/17/20 0539    Specimen:  Blood Updated:  01/17/20 0554    Narrative:       The following orders were created for panel order CBC & Differential.  Procedure                               Abnormality         Status                     ---------                               -----------         ------                     CBC Auto Differential[[id number]]        Abnormal            Final result                 Please view results for these tests on the individual orders.    CBC Auto Differential [[id number]]  (Abnormal) Collected:  01/17/20 0539    Specimen:  Blood Updated:  01/17/20 0554     WBC 6.01 10*3/mm3      RBC 5.36 10*6/mm3      Hemoglobin 15.4 g/dL      Hematocrit 47.0 %      MCV 87.7 fL      MCH 28.7 pg      MCHC 32.8 g/dL      RDW 13.9 %      RDW-SD 44.3 fl      MPV 12.2 fL      Platelets  109 10*3/mm3      Neutrophil % 75.4 %      Lymphocyte % 15.3 %      Monocyte % 7.0 %      Eosinophil % 1.5 %      Basophil % 0.5 %      Immature Grans % 0.3 %      Neutrophils, Absolute 4.53 10*3/mm3      Lymphocytes, Absolute 0.92 10*3/mm3      Monocytes, Absolute 0.42 10*3/mm3      Eosinophils, Absolute 0.09 10*3/mm3      Basophils, Absolute 0.03 10*3/mm3      Immature Grans, Absolute 0.02 10*3/mm3      nRBC 0.0 /100 WBC     POC Glucose Once [[id number]]  (Abnormal) Collected:  01/16/20 2136    Specimen:  Blood Updated:  01/16/20 2137     Glucose 244 mg/dL     POC Glucose Once [[id number]]  (Abnormal) Collected:  01/16/20 1708    Specimen:  Blood Updated:  01/16/20 1712     Glucose 285 mg/dL     Basic Metabolic Panel [[id number]]  (Abnormal) Collected:  01/16/20 0559    Specimen:  Blood Updated:  01/16/20 0651     Glucose 167 mg/dL      BUN 30 mg/dL      Creatinine 1.46 mg/dL      Sodium 138 mmol/L      Potassium 4.8 mmol/L      Chloride 104 mmol/L      CO2 21.0 mmol/L      Calcium 9.4 mg/dL      eGFR Non African Amer 46 mL/min/1.73      BUN/Creatinine Ratio 20.5     Anion Gap 13.0 mmol/L     Narrative:       GFR Normal >60  Chronic Kidney Disease <60  Kidney Failure <15      POC Glucose Once [[id number]]  (Abnormal) Collected:  01/16/20 0643    Specimen:  Blood Updated:  01/16/20 0644     Glucose 157 mg/dL     Protime-INR [[id number]]  (Abnormal) Collected:  01/16/20 0559    Specimen:  Blood Updated:  01/16/20 0640     Protime 18.6 Seconds      INR 1.59    POC Glucose Once [[id number]]  (Abnormal) Collected:  01/15/20 2134    Specimen:  Blood Updated:  01/15/20 2136     Glucose 238 mg/dL     POC Glucose Once [[id number]]  (Abnormal) Collected:  01/15/20 1655    Specimen:  Blood Updated:  01/15/20 1709     Glucose 200 mg/dL     POC Glucose Once [[id number]]  (Abnormal) Collected:  01/15/20 1123    Specimen:  Blood Updated:  01/15/20 1133     Glucose 222 mg/dL     Vitamin B12 [[id number]]  (Normal) Collected:  01/13/20 0747     Specimen:  Blood Updated:  01/15/20 1115     Vitamin B-12 381 pg/mL     Narrative:       Results may be falsely increased if patient taking Biotin.      Folate [[id number]]  (Normal) Collected:  01/13/20 0747    Specimen:  Blood Updated:  01/15/20 1115     Folate 9.32 ng/mL     Narrative:       Results may be falsely increased if patient taking Biotin.      TSH [[id number]]  (Normal) Collected:  01/15/20 0646    Specimen:  Blood from Hand, Left Updated:  01/15/20 1106     TSH 3.250 uIU/mL     Basic Metabolic Panel [[id number]]  (Abnormal) Collected:  01/15/20 0646    Specimen:  Blood from Hand, Left Updated:  01/15/20 0726     Glucose 131 mg/dL      BUN 25 mg/dL      Creatinine 1.42 mg/dL      Sodium 137 mmol/L      Potassium 4.8 mmol/L      Chloride 104 mmol/L      CO2 22.2 mmol/L      Calcium 9.0 mg/dL      eGFR Non African Amer 48 mL/min/1.73      BUN/Creatinine Ratio 17.6     Anion Gap 10.8 mmol/L     Narrative:       GFR Normal >60  Chronic Kidney Disease <60  Kidney Failure <15      Protime-INR [[id number]]  (Abnormal) Collected:  01/15/20 0646    Specimen:  Blood from Hand, Left Updated:  01/15/20 0719     Protime 20.6 Seconds      INR 1.80    CBC & Differential [[id number]] Collected:  01/15/20 0646    Specimen:  Blood from Hand, Left Updated:  01/15/20 0703    Narrative:       The following orders were created for panel order CBC & Differential.  Procedure                               Abnormality         Status                     ---------                               -----------         ------                     CBC Auto Differential[[id number]]        Abnormal            Final result                 Please view results for these tests on the individual orders.    CBC Auto Differential [[id number]]  (Abnormal) Collected:  01/15/20 0646    Specimen:  Blood from Hand, Left Updated:  01/15/20 0703     WBC 5.54 10*3/mm3      RBC 5.35 10*6/mm3      Hemoglobin 15.1 g/dL      Hematocrit 46.0 %      MCV 86.0 fL         MCH 28.2 pg      MCHC 32.8 g/dL      RDW 13.7 %      RDW-SD 42.8 fl      MPV 12.1 fL      Platelets 101 10*3/mm3      Neutrophil % 69.1 %      Lymphocyte % 20.9 %      Monocyte % 6.9 %      Eosinophil % 2.3 %      Basophil % 0.4 %      Immature Grans % 0.4 %      Neutrophils, Absolute 3.83 10*3/mm3      Lymphocytes, Absolute 1.16 10*3/mm3      Monocytes, Absolute 0.38 10*3/mm3      Eosinophils, Absolute 0.13 10*3/mm3      Basophils, Absolute 0.02 10*3/mm3      Immature Grans, Absolute 0.02 10*3/mm3      nRBC 0.0 /100 WBC     POC Glucose Once [[id number]]  (Normal) Collected:  01/15/20 0602    Specimen:  Blood Updated:  01/15/20 0606     Glucose 120 mg/dL     POC Glucose Once [[id number]]  (Abnormal) Collected:  01/14/20 2022    Specimen:  Blood Updated:  01/14/20 2026     Glucose 246 mg/dL     C-reactive Protein [[id number]]  (Normal) Collected:  01/14/20 1820    Specimen:  Blood Updated:  01/14/20 1853     C-Reactive Protein 0.30 mg/dL     POC Glucose Once [[id number]]  (Abnormal) Collected:  01/14/20 1717    Specimen:  Blood Updated:  01/14/20 1724     Glucose 153 mg/dL         Data Review:  Results from last 7 days   Lab Units 01/17/20  0539 01/16/20  0559 01/15/20  0646   SODIUM mmol/L 140 138 137   POTASSIUM mmol/L 4.7 4.8 4.8   CHLORIDE mmol/L 104 104 104   CO2 mmol/L 23.9 21.0* 22.2   BUN mg/dL 33* 30* 25*   CREATININE mg/dL 1.42* 1.46* 1.42*   GLUCOSE mg/dL 210* 167* 131*   CALCIUM mg/dL 9.1 9.4 9.0     Results from last 7 days   Lab Units 01/17/20  0539 01/15/20  0646 01/14/20  0516   WBC 10*3/mm3 6.01 5.54 6.19   HEMOGLOBIN g/dL 15.4 15.1 15.6   HEMATOCRIT % 47.0 46.0 47.5   PLATELETS 10*3/mm3 109* 101* 98*     Results from last 7 days   Lab Units 01/15/20  0646   TSH uIU/mL 3.250     Results from last 7 days   Lab Units 01/14/20  0516   HEMOGLOBIN A1C % 7.40*     Lab Results   Lab Value Date/Time    TROPONINT <0.010 01/13/2020 0747    TROPONINT <0.010 03/30/2017 0809     Results from last 7 days   Lab  Units 01/14/20  0516   CHOLESTEROL mg/dL 154   TRIGLYCERIDES mg/dL 161*   HDL CHOL mg/dL 34*   LDL CHOL mg/dL 88     Results from last 7 days   Lab Units 01/13/20  0747   ALK PHOS U/L 80   BILIRUBIN mg/dL 0.5   ALT (SGPT) U/L 20   AST (SGOT) U/L 18     Results from last 7 days   Lab Units 01/15/20  0646   TSH uIU/mL 3.250     Results from last 7 days   Lab Units 01/14/20  0516   HEMOGLOBIN A1C % 7.40*     Glucose   Date/Time Value Ref Range Status   01/17/2020 1056 233 (H) 70 - 130 mg/dL Final   01/17/2020 0621 187 (H) 70 - 130 mg/dL Final   01/16/2020 2136 244 (H) 70 - 130 mg/dL Final   01/16/2020 1708 285 (H) 70 - 130 mg/dL Final   01/16/2020 0643 157 (H) 70 - 130 mg/dL Final   01/15/2020 2134 238 (H) 70 - 130 mg/dL Final   01/15/2020 1655 200 (H) 70 - 130 mg/dL Final   01/15/2020 1123 222 (H) 70 - 130 mg/dL Final     Results from last 7 days   Lab Units 01/17/20  0539 01/16/20  0559 01/15/20  0646   INR  1.50* 1.59* 1.80*       Past Medical History:   Diagnosis Date   • Abnormal electrocardiogram    • Allergic rhinitis    • Aortic valve insufficiency    • Ascending aortic aneurysm (CMS/HCC)    • Atrial fibrillation (CMS/HCC)    • Bacteremia    • Calcific aortic stenosis of bicuspid valve    • Cardiac arrest (CMS/HCC)    • Cardiomyopathy (CMS/HCC)    • Contact dermatitis due to poison ivy    • Diabetes mellitus (CMS/HCC)    • Elbow fracture    • Esophageal reflux    • GERD (gastroesophageal reflux disease)    • Head injury    • Hyperglycemia    • Hyperlipidemia    • Hypertension    • Kidney carcinoma (CMS/HCC)    • Leg swelling    • Localized swelling of both lower legs    • Nasal congestion    • Nasal drainage    • Nonischemic cardiomyopathy (CMS/HCC)    • Palpitations    • Pedal edema    • Pleural effusion on left    • Renal insufficiency syndrome    • Renal oncocytoma    • Seasonal allergic reaction    • Sinusitis    • Syncope    • Type 2 diabetes mellitus (CMS/HCC)     uncontrolled   • Vision changes    •  Visual field defect        Assessment:  Active Hospital Problems    Diagnosis  POA   • BYRON (acute kidney injury) (CMS/HCC) [N17.9]  Yes   • Acute cerebral infarction (CMS/HCC) [I63.9]  Yes   • Stroke-like symptom [R29.90]  Yes   • Kidney carcinoma (CMS/HCC) [C64.9]  Yes   • CKD (chronic kidney disease) stage 3, GFR 30-59 ml/min (CMS/formerly [facility]) [N18.3]  Yes   • Hx of aortic valve replacement, mechanical [Z95.2] [Z95.2]  Not Applicable   • Uncontrolled type 2 diabetes mellitus (CMS/HCC) [E11.65]  Yes   • Hyperlipidemia [E78.5]  Yes   • Atrial fibrillation (CMS/HCC) [I48.91]  Yes   • Hypertension [I10]  Yes      Resolved Hospital Problems   No resolved problems to display.       Plan:  Continue anticoagulation. As per cardiology and neurology.. Follow lab.    Leobardo Juarez MD  1/17/2020  3:21 PM

## 2020-01-17 NOTE — PROGRESS NOTES
Hospital Follow Up    LOS:  LOS: 3 days   Patient Name: Francisco Sequeira  Age/Sex: 84 y.o. male  : 1935  MRN: [id number]    Day of Service: 20   Length of Stay: 3  Encounter Provider: Griffin Fried MD  Place of Service: [facility] CARDIOLOGY  Patient Care Team:  Rubin Hinkle APRN as PCP - General (Family Medicine)  Rubin Hinkle APRN as PCP - Claims Attributed  Audra Vazquez [facility] as Pharmacist  Vasquez Niño [facility] as Pharmacist (Pharmacy)    Subjective:     Chief Complaint: Stroke    Interval History: Patient doing well today.  ABELARDO was unremarkable valve structure was good no signs of clot on his metal valve.  His left atrial appendage was also occluded surgically.    Objective:     Objective:  Temp:  [97.1 °F (36.2 °C)-98.5 °F (36.9 °C)] 97.1 °F (36.2 °C)  Heart Rate:  [] 78  Resp:  [12-20] 18  BP: (114-167)/(57-93) 116/78     Intake/Output Summary (Last 24 hours) at 2020 0905  Last data filed at 2020 2300  Gross per 24 hour   Intake --   Output 425 ml   Net -425 ml     Body mass index is 23.83 kg/m².      01/15/20  0510 20  0500 20  0500   Weight: 77.1 kg (169 lb 14.4 oz) 76.3 kg (168 lb 3.2 oz) 78.9 kg (174 lb)     Weight change: 2.631 kg (5 lb 12.8 oz)      Physical Exam:   General : Alert, cooperative, in no acute distress.  Neuro: Alert,cooperative and oriented.  Lungs: CTAB. Normal respiratory effort and rate.  CV: Regular rate and rhythm, normal S1 and S2, no murmurs, gallops or rubs.  ABD: Soft, nontender, nondistended. Positive bowel sounds.  Extr: No edema or cyanosis, moves all extremities.    Lab Review:   Results from last 7 days   Lab Units 20  0539 20  0559  20  0747   SODIUM mmol/L 140 138   < > 143   POTASSIUM mmol/L 4.7 4.8   < > 4.4   CHLORIDE mmol/L 104 104   < > 107   CO2 mmol/L 23.9 21.0*   < > 25.2   BUN mg/dL 33* 30*   < > 29*   CREATININE mg/dL 1.42* 1.46*   < > 2.04*   GLUCOSE mg/dL 210*  167*   < > 68   CALCIUM mg/dL 9.1 9.4   < > 8.9   AST (SGOT) U/L  --   --   --  18   ALT (SGPT) U/L  --   --   --  20    < > = values in this interval not displayed.     Results from last 7 days   Lab Units 01/13/20  0747   TROPONIN T ng/mL <0.010     Results from last 7 days   Lab Units 01/17/20  0539 01/15/20  0646   WBC 10*3/mm3 6.01 5.54   HEMOGLOBIN g/dL 15.4 15.1   HEMATOCRIT % 47.0 46.0   PLATELETS 10*3/mm3 109* 101*     Results from last 7 days   Lab Units 01/17/20  0539 01/16/20  0559   INR  1.50* 1.59*         Results from last 7 days   Lab Units 01/14/20  0516   CHOLESTEROL mg/dL 154   TRIGLYCERIDES mg/dL 161*   HDL CHOL mg/dL 34*         Results from last 7 days   Lab Units 01/15/20  0646   TSH uIU/mL 3.250       Current Medications:   Scheduled Meds:  aspirin 325 mg Oral Daily   Or      aspirin 300 mg Rectal Daily   atorvastatin 40 mg Oral Daily   digoxin 125 mcg Oral Daily   enoxaparin 1 mg/kg Subcutaneous Q12H   guaiFENesin 600 mg Oral Q12H   insulin lispro 0-7 Units Subcutaneous 4x Daily With Meals & Nightly   magnesium oxide 400 mg Oral Daily   metoprolol succinate XL 25 mg Oral Daily   sodium bicarbonate 1,950 mg Oral BID   sodium chloride 10 mL Intravenous Q12H   warfarin 7.5 mg Oral Once     Continuous Infusions:  Pharmacy to dose warfarin        Allergies:  Allergies   Allergen Reactions   • Other      Grass, ragweed   • Penicillins    • Percocet  [Oxycodone-Acetaminophen]        Assessment:       Atrial fibrillation (CMS/HCC)    Hypertension    Uncontrolled type 2 diabetes mellitus (CMS/HCC)    Hyperlipidemia    Hx of aortic valve replacement, mechanical [Z95.2]    Stroke-like symptom    Kidney carcinoma (CMS/HCC)    CKD (chronic kidney disease) stage 3, GFR 30-59 ml/min (CMS/HCC)    BYRON (acute kidney injury) (CMS/[facility])    Acute cerebral infarction (CMS/HCC)        Plan:   1.  Chronic atrial fibrillation.  2.  Metal aortic valve.  His INR on presentation was 2.21.  It had been stable as an  outpatient they were checking it only once a month because he been so stable which would be appropriate.  In future however we may need to do it twice a month just because of the current event.  Although his INR is down a little from recommend dose it still was pretty good especially with a metal aortic valve.  3.    ABELARDO done yesterday.  Patient's metal valve appeared well with no sign of thrombus.  Patient's INR was 2.2 upon presentation.  Patient's left atrial appendage was also ligated.  At this point I run his INR at 3-3.5.  Nephrology suggested about home monitoring for his INR and if that is an excellent option and we will pursue this as an outpatient.  Would await to his INR achieves 3 and then discharge.  Nothing further to add from my standpoint will sign off.      Griffin Fried MD  01/17/20  9:05 AM

## 2020-01-18 LAB
ANION GAP SERPL CALCULATED.3IONS-SCNC: 12 MMOL/L (ref 5–15)
BASOPHILS # BLD AUTO: 0.02 10*3/MM3 (ref 0–0.2)
BASOPHILS NFR BLD AUTO: 0.4 % (ref 0–1.5)
BUN BLD-MCNC: 32 MG/DL (ref 8–23)
BUN/CREAT SERPL: 23.9 (ref 7–25)
CALCIUM SPEC-SCNC: 9.3 MG/DL (ref 8.6–10.5)
CHLORIDE SERPL-SCNC: 103 MMOL/L (ref 98–107)
CO2 SERPL-SCNC: 23 MMOL/L (ref 22–29)
CREAT BLD-MCNC: 1.34 MG/DL (ref 0.76–1.27)
DEPRECATED RDW RBC AUTO: 41.3 FL (ref 37–54)
EOSINOPHIL # BLD AUTO: 0.12 10*3/MM3 (ref 0–0.4)
EOSINOPHIL NFR BLD AUTO: 2.2 % (ref 0.3–6.2)
ERYTHROCYTE [DISTWIDTH] IN BLOOD BY AUTOMATED COUNT: 13.5 % (ref 12.3–15.4)
GFR SERPL CREATININE-BSD FRML MDRD: 51 ML/MIN/1.73
GLUCOSE BLD-MCNC: 203 MG/DL (ref 65–99)
GLUCOSE BLDC GLUCOMTR-MCNC: 217 MG/DL (ref 70–130)
GLUCOSE BLDC GLUCOMTR-MCNC: 262 MG/DL (ref 70–130)
GLUCOSE BLDC GLUCOMTR-MCNC: 303 MG/DL (ref 70–130)
GLUCOSE BLDC GLUCOMTR-MCNC: 305 MG/DL (ref 70–130)
HCT VFR BLD AUTO: 45.8 % (ref 37.5–51)
HGB BLD-MCNC: 15 G/DL (ref 13–17.7)
IMM GRANULOCYTES # BLD AUTO: 0.01 10*3/MM3 (ref 0–0.05)
IMM GRANULOCYTES NFR BLD AUTO: 0.2 % (ref 0–0.5)
INR PPP: 1.94 (ref 0.9–1.1)
LYMPHOCYTES # BLD AUTO: 1.12 10*3/MM3 (ref 0.7–3.1)
LYMPHOCYTES NFR BLD AUTO: 20.3 % (ref 19.6–45.3)
MCH RBC QN AUTO: 27.9 PG (ref 26.6–33)
MCHC RBC AUTO-ENTMCNC: 32.8 G/DL (ref 31.5–35.7)
MCV RBC AUTO: 85.1 FL (ref 79–97)
MONOCYTES # BLD AUTO: 0.41 10*3/MM3 (ref 0.1–0.9)
MONOCYTES NFR BLD AUTO: 7.4 % (ref 5–12)
NEUTROPHILS # BLD AUTO: 3.84 10*3/MM3 (ref 1.7–7)
NEUTROPHILS NFR BLD AUTO: 69.5 % (ref 42.7–76)
NRBC BLD AUTO-RTO: 0 /100 WBC (ref 0–0.2)
PLATELET # BLD AUTO: 106 10*3/MM3 (ref 140–450)
PMV BLD AUTO: 12.4 FL (ref 6–12)
POTASSIUM BLD-SCNC: 4.7 MMOL/L (ref 3.5–5.2)
PROTHROMBIN TIME: 21.8 SECONDS (ref 11.7–14.2)
RBC # BLD AUTO: 5.38 10*6/MM3 (ref 4.14–5.8)
SODIUM BLD-SCNC: 138 MMOL/L (ref 136–145)
WBC NRBC COR # BLD: 5.52 10*3/MM3 (ref 3.4–10.8)

## 2020-01-18 PROCEDURE — 25010000002 ENOXAPARIN PER 10 MG: Performed by: PSYCHIATRY & NEUROLOGY

## 2020-01-18 PROCEDURE — 80048 BASIC METABOLIC PNL TOTAL CA: CPT | Performed by: NURSE PRACTITIONER

## 2020-01-18 PROCEDURE — 63710000001 INSULIN LISPRO (HUMAN) PER 5 UNITS: Performed by: NURSE PRACTITIONER

## 2020-01-18 PROCEDURE — 97116 GAIT TRAINING THERAPY: CPT

## 2020-01-18 PROCEDURE — 85025 COMPLETE CBC W/AUTO DIFF WBC: CPT | Performed by: HOSPITALIST

## 2020-01-18 PROCEDURE — 85610 PROTHROMBIN TIME: CPT | Performed by: NURSE PRACTITIONER

## 2020-01-18 PROCEDURE — 36415 COLL VENOUS BLD VENIPUNCTURE: CPT | Performed by: NURSE PRACTITIONER

## 2020-01-18 PROCEDURE — 82962 GLUCOSE BLOOD TEST: CPT

## 2020-01-18 RX ORDER — WARFARIN SODIUM 10 MG/1
10 TABLET ORAL
Status: COMPLETED | OUTPATIENT
Start: 2020-01-18 | End: 2020-01-18

## 2020-01-18 RX ADMIN — GUAIFENESIN 600 MG: 600 TABLET, EXTENDED RELEASE ORAL at 09:26

## 2020-01-18 RX ADMIN — ENOXAPARIN SODIUM 80 MG: 80 INJECTION SUBCUTANEOUS at 05:43

## 2020-01-18 RX ADMIN — INSULIN LISPRO 4 UNITS: 100 INJECTION, SOLUTION INTRAVENOUS; SUBCUTANEOUS at 17:37

## 2020-01-18 RX ADMIN — ENOXAPARIN SODIUM 80 MG: 80 INJECTION SUBCUTANEOUS at 17:37

## 2020-01-18 RX ADMIN — SODIUM BICARBONATE 1950 MG: 650 TABLET ORAL at 09:26

## 2020-01-18 RX ADMIN — Medication 400 MG: at 09:26

## 2020-01-18 RX ADMIN — INSULIN LISPRO 5 UNITS: 100 INJECTION, SOLUTION INTRAVENOUS; SUBCUTANEOUS at 22:04

## 2020-01-18 RX ADMIN — DIGOXIN 125 MCG: 125 TABLET ORAL at 09:26

## 2020-01-18 RX ADMIN — GUAIFENESIN 600 MG: 600 TABLET, EXTENDED RELEASE ORAL at 22:04

## 2020-01-18 RX ADMIN — SODIUM CHLORIDE, PRESERVATIVE FREE 10 ML: 5 INJECTION INTRAVENOUS at 22:05

## 2020-01-18 RX ADMIN — SODIUM CHLORIDE, PRESERVATIVE FREE 10 ML: 5 INJECTION INTRAVENOUS at 09:27

## 2020-01-18 RX ADMIN — METOPROLOL SUCCINATE 25 MG: 25 TABLET, EXTENDED RELEASE ORAL at 09:26

## 2020-01-18 RX ADMIN — INSULIN LISPRO 5 UNITS: 100 INJECTION, SOLUTION INTRAVENOUS; SUBCUTANEOUS at 12:57

## 2020-01-18 RX ADMIN — ATORVASTATIN CALCIUM 40 MG: 20 TABLET, FILM COATED ORAL at 09:26

## 2020-01-18 RX ADMIN — INSULIN LISPRO 3 UNITS: 100 INJECTION, SOLUTION INTRAVENOUS; SUBCUTANEOUS at 09:26

## 2020-01-18 RX ADMIN — SODIUM BICARBONATE 1950 MG: 650 TABLET ORAL at 22:04

## 2020-01-18 RX ADMIN — ASPIRIN 325 MG: 325 TABLET ORAL at 09:26

## 2020-01-18 RX ADMIN — WARFARIN SODIUM 10 MG: 10 TABLET ORAL at 17:37

## 2020-01-18 NOTE — PLAN OF CARE
Problem: Patient Care Overview  Goal: Plan of Care Review  1/18/2020 0528 by Louis Jain RN  Flowsheets (Taken 1/18/2020 0528)  Progress: improving  Plan of Care Reviewed With: patient  Outcome Summary: Patient rested quietly overnight only awakenings were to void, patient expresses understanding that he must stay in the hospital till INR reaches therapeutic levels >3.0

## 2020-01-18 NOTE — PLAN OF CARE
Problem: Patient Care Overview  Goal: Plan of Care Review  Outcome: Ongoing (interventions implemented as appropriate)  Flowsheets (Taken 1/18/2020 1641)  Progress: improving  Plan of Care Reviewed With: patient  Outcome Summary: OOB to chair and hallway, awaiting INR 3-3.5, cont. to monitor.

## 2020-01-18 NOTE — PROGRESS NOTES
Pharmacy Consult: Warfarin Dosing/ Monitoring    Francisco Sequeira is a 84 y.o. male, estimated creatinine clearance is 43.7 mL/min (A) (by C-G formula based on SCr of 1.42 mg/dL (H)). weighing 77.1 kg (169 lb 14.4 oz).      Results from last 7 days   Lab Units 01/18/20  0607 01/17/20  0539 01/16/20  0559 01/15/20  0646 01/14/20  0516 01/13/20  0747   INR  1.94* 1.50* 1.59* 1.80* 1.99* 2.21*   HEMOGLOBIN g/dL 15.0 15.4  --  15.1 15.6 14.8   HEMATOCRIT % 45.8 47.0  --  46.0 47.5 45.2   PLATELETS 10*3/mm3 106* 109*  --  101* 98* 109*     Results from last 7 days   Lab Units 01/18/20  0607 01/17/20  0539 01/16/20  0559  01/13/20  0747   SODIUM mmol/L 138 140 138   < > 143   POTASSIUM mmol/L 4.7 4.7 4.8   < > 4.4   CHLORIDE mmol/L 103 104 104   < > 107   CO2 mmol/L 23.0 23.9 21.0*   < > 25.2   BUN mg/dL 32* 33* 30*   < > 29*   CREATININE mg/dL 1.34* 1.42* 1.46*   < > 2.04*   CALCIUM mg/dL 9.3 9.1 9.4   < > 8.9   BILIRUBIN mg/dL  --   --   --   --  0.5   ALK PHOS U/L  --   --   --   --  80   ALT (SGPT) U/L  --   --   --   --  20   AST (SGOT) U/L  --   --   --   --  18   GLUCOSE mg/dL 203* 210* 167*   < > 68    < > = values in this interval not displayed.     Anticoagulation history: Per last note from [facility] on 12/30, patient's home warfarin regimen is 5 mg qMWF and 7.5 mg TThSSun.     Hospital Anticoagulation:  Consulting provider: LIZA Zayas  Start date: 1/13/20  Indication: Aortic mechanical valve  Target INR: 2.5-3.5  Expected duration: Lifelong  Bridge Therapy: Yes              and enoxapain  Date 1/13 1/14 1/15 1/16 1/17 1/18       INR 2.21 1.99 1.80 1.59 1.5 1.94       Warfarin dose  7.5mg 7.5mg 7.5mg 10mg 10 mg         Potential drug interactions:     Relevant nutrition status: reg cardiac diet    Other: looks like dose on 1/13 was never given    Education complete?/ Date: no    Assessment/Plan:  INR still subtherpaeutic but is climbing, goal per cardio is 3-3.5.  Will give another 10mg X one  today and review again in am.    Temi Llanos PharmD, BCPS

## 2020-01-18 NOTE — PROGRESS NOTES
"DAILY PROGRESS NOTE  [facility]    Patient Identification:  Name: Francisco Sequeira  Age: 84 y.o.  Sex: male  :  1935  MRN: [id number]         Primary Care Physician: Rubin Hinkle APRN    Subjective:  Interval History:He feels better.    Objective:    Scheduled Meds:    aspirin 325 mg Oral Daily   Or      aspirin 300 mg Rectal Daily   atorvastatin 40 mg Oral Daily   digoxin 125 mcg Oral Daily   enoxaparin 1 mg/kg Subcutaneous Q12H   guaiFENesin 600 mg Oral Q12H   insulin lispro 0-7 Units Subcutaneous 4x Daily With Meals & Nightly   magnesium oxide 400 mg Oral Daily   metoprolol succinate XL 25 mg Oral Daily   sodium bicarbonate 1,950 mg Oral BID   sodium chloride 10 mL Intravenous Q12H   warfarin sodium 10 mg Oral Daily     Continuous Infusions:    Pharmacy to dose warfarin        Vital signs in last 24 hours:  Temp:  [97.3 °F (36.3 °C)-97.8 °F (36.6 °C)] 97.7 °F (36.5 °C)  Heart Rate:  [73-77] 73  Resp:  [18] 18  BP: (125-149)/(72-86) 144/82    Intake/Output:    Intake/Output Summary (Last 24 hours) at 2020 1633  Last data filed at 2020 1351  Gross per 24 hour   Intake 960 ml   Output --   Net 960 ml       Exam:  /82 (BP Location: Right arm, Patient Position: Sitting)   Pulse 73   Temp 97.7 °F (36.5 °C) (Oral)   Resp 18   Ht 182 cm (71.65\")   Wt 79.5 kg (175 lb 4.8 oz)   SpO2 96%   BMI 24.01 kg/m²     General Appearance:    Alert, cooperative, no distress   Head:    Normocephalic, without obvious abnormality, atraumatic   Eyes:       Throat:   Lips, tongue, gums normal   Neck:   Supple, symmetrical, trachea midline, no JVD   Lungs:     Clear to auscultation bilaterally, respirations unlabored   Chest Wall:    No tenderness or deformity    Heart:    Regular rate and rhythm, S1 and S2 normal, no murmur,no  Rub or gallop   Abdomen:     Soft, non-tender, bowel sounds active, no masses, no organomegaly    Extremities:   Extremities normal, atraumatic, no cyanosis or " edema   Pulses:      Skin:   Skin is warm and dry,  no rashes or palpable lesions   Neurologic:   no focal deficits noted      Lab Results (last 72 hours)     Procedure Component Value Units Date/Time    POC Glucose Once [[id number]]  (Abnormal) Collected:  01/17/20 1056    Specimen:  Blood Updated:  01/17/20 1057     Glucose 233 mg/dL     POC Glucose Once [[id number]]  (Abnormal) Collected:  01/17/20 0621    Specimen:  Blood Updated:  01/17/20 0623     Glucose 187 mg/dL     Basic Metabolic Panel [[id number]]  (Abnormal) Collected:  01/17/20 0539    Specimen:  Blood Updated:  01/17/20 0618     Glucose 210 mg/dL      BUN 33 mg/dL      Creatinine 1.42 mg/dL      Sodium 140 mmol/L      Potassium 4.7 mmol/L      Chloride 104 mmol/L      CO2 23.9 mmol/L      Calcium 9.1 mg/dL      eGFR Non African Amer 48 mL/min/1.73      BUN/Creatinine Ratio 23.2     Anion Gap 12.1 mmol/L     Narrative:       GFR Normal >60  Chronic Kidney Disease <60  Kidney Failure <15      Protime-INR [[id number]]  (Abnormal) Collected:  01/17/20 0539    Specimen:  Blood Updated:  01/17/20 0604     Protime 17.8 Seconds      INR 1.50    CBC & Differential [[id number]] Collected:  01/17/20 0539    Specimen:  Blood Updated:  01/17/20 0554    Narrative:       The following orders were created for panel order CBC & Differential.  Procedure                               Abnormality         Status                     ---------                               -----------         ------                     CBC Auto Differential[[id number]]        Abnormal            Final result                 Please view results for these tests on the individual orders.    CBC Auto Differential [[id number]]  (Abnormal) Collected:  01/17/20 0539    Specimen:  Blood Updated:  01/17/20 0554     WBC 6.01 10*3/mm3      RBC 5.36 10*6/mm3      Hemoglobin 15.4 g/dL      Hematocrit 47.0 %      MCV 87.7 fL      MCH 28.7 pg      MCHC 32.8 g/dL      RDW 13.9 %      RDW-SD 44.3 fl      MPV 12.2  fL      Platelets 109 10*3/mm3      Neutrophil % 75.4 %      Lymphocyte % 15.3 %      Monocyte % 7.0 %      Eosinophil % 1.5 %      Basophil % 0.5 %      Immature Grans % 0.3 %      Neutrophils, Absolute 4.53 10*3/mm3      Lymphocytes, Absolute 0.92 10*3/mm3      Monocytes, Absolute 0.42 10*3/mm3      Eosinophils, Absolute 0.09 10*3/mm3      Basophils, Absolute 0.03 10*3/mm3      Immature Grans, Absolute 0.02 10*3/mm3      nRBC 0.0 /100 WBC     POC Glucose Once [[id number]]  (Abnormal) Collected:  01/16/20 2136    Specimen:  Blood Updated:  01/16/20 2137     Glucose 244 mg/dL     POC Glucose Once [[id number]]  (Abnormal) Collected:  01/16/20 1708    Specimen:  Blood Updated:  01/16/20 1712     Glucose 285 mg/dL     Basic Metabolic Panel [[id number]]  (Abnormal) Collected:  01/16/20 0559    Specimen:  Blood Updated:  01/16/20 0651     Glucose 167 mg/dL      BUN 30 mg/dL      Creatinine 1.46 mg/dL      Sodium 138 mmol/L      Potassium 4.8 mmol/L      Chloride 104 mmol/L      CO2 21.0 mmol/L      Calcium 9.4 mg/dL      eGFR Non African Amer 46 mL/min/1.73      BUN/Creatinine Ratio 20.5     Anion Gap 13.0 mmol/L     Narrative:       GFR Normal >60  Chronic Kidney Disease <60  Kidney Failure <15      POC Glucose Once [[id number]]  (Abnormal) Collected:  01/16/20 0643    Specimen:  Blood Updated:  01/16/20 0644     Glucose 157 mg/dL     Protime-INR [[id number]]  (Abnormal) Collected:  01/16/20 0559    Specimen:  Blood Updated:  01/16/20 0640     Protime 18.6 Seconds      INR 1.59    POC Glucose Once [[id number]]  (Abnormal) Collected:  01/15/20 2134    Specimen:  Blood Updated:  01/15/20 2136     Glucose 238 mg/dL     POC Glucose Once [[id number]]  (Abnormal) Collected:  01/15/20 1655    Specimen:  Blood Updated:  01/15/20 1709     Glucose 200 mg/dL     POC Glucose Once [[id number]]  (Abnormal) Collected:  01/15/20 1123    Specimen:  Blood Updated:  01/15/20 1133     Glucose 222 mg/dL     Vitamin B12 [[id number]]  (Normal)  Collected:  01/13/20 0747    Specimen:  Blood Updated:  01/15/20 1115     Vitamin B-12 381 pg/mL     Narrative:       Results may be falsely increased if patient taking Biotin.      Folate [[id number]]  (Normal) Collected:  01/13/20 0747    Specimen:  Blood Updated:  01/15/20 1115     Folate 9.32 ng/mL     Narrative:       Results may be falsely increased if patient taking Biotin.      TSH [[id number]]  (Normal) Collected:  01/15/20 0646    Specimen:  Blood from Hand, Left Updated:  01/15/20 1106     TSH 3.250 uIU/mL     Basic Metabolic Panel [[id number]]  (Abnormal) Collected:  01/15/20 0646    Specimen:  Blood from Hand, Left Updated:  01/15/20 0726     Glucose 131 mg/dL      BUN 25 mg/dL      Creatinine 1.42 mg/dL      Sodium 137 mmol/L      Potassium 4.8 mmol/L      Chloride 104 mmol/L      CO2 22.2 mmol/L      Calcium 9.0 mg/dL      eGFR Non African Amer 48 mL/min/1.73      BUN/Creatinine Ratio 17.6     Anion Gap 10.8 mmol/L     Narrative:       GFR Normal >60  Chronic Kidney Disease <60  Kidney Failure <15      Protime-INR [[id number]]  (Abnormal) Collected:  01/15/20 0646    Specimen:  Blood from Hand, Left Updated:  01/15/20 0719     Protime 20.6 Seconds      INR 1.80    CBC & Differential [[id number]] Collected:  01/15/20 0646    Specimen:  Blood from Hand, Left Updated:  01/15/20 0703    Narrative:       The following orders were created for panel order CBC & Differential.  Procedure                               Abnormality         Status                     ---------                               -----------         ------                     CBC Auto Differential[[id number]]        Abnormal            Final result                 Please view results for these tests on the individual orders.    CBC Auto Differential [[id number]]  (Abnormal) Collected:  01/15/20 0646    Specimen:  Blood from Hand, Left Updated:  01/15/20 0703     WBC 5.54 10*3/mm3      RBC 5.35 10*6/mm3      Hemoglobin 15.1 g/dL      Hematocrit  46.0 %      MCV 86.0 fL      MCH 28.2 pg      MCHC 32.8 g/dL      RDW 13.7 %      RDW-SD 42.8 fl      MPV 12.1 fL      Platelets 101 10*3/mm3      Neutrophil % 69.1 %      Lymphocyte % 20.9 %      Monocyte % 6.9 %      Eosinophil % 2.3 %      Basophil % 0.4 %      Immature Grans % 0.4 %      Neutrophils, Absolute 3.83 10*3/mm3      Lymphocytes, Absolute 1.16 10*3/mm3      Monocytes, Absolute 0.38 10*3/mm3      Eosinophils, Absolute 0.13 10*3/mm3      Basophils, Absolute 0.02 10*3/mm3      Immature Grans, Absolute 0.02 10*3/mm3      nRBC 0.0 /100 WBC     POC Glucose Once [[id number]]  (Normal) Collected:  01/15/20 0602    Specimen:  Blood Updated:  01/15/20 0606     Glucose 120 mg/dL     POC Glucose Once [[id number]]  (Abnormal) Collected:  01/14/20 2022    Specimen:  Blood Updated:  01/14/20 2026     Glucose 246 mg/dL     C-reactive Protein [[id number]]  (Normal) Collected:  01/14/20 1820    Specimen:  Blood Updated:  01/14/20 1853     C-Reactive Protein 0.30 mg/dL     POC Glucose Once [[id number]]  (Abnormal) Collected:  01/14/20 1717    Specimen:  Blood Updated:  01/14/20 1724     Glucose 153 mg/dL         Data Review:  Results from last 7 days   Lab Units 01/18/20  0607 01/17/20  0539 01/16/20  0559   SODIUM mmol/L 138 140 138   POTASSIUM mmol/L 4.7 4.7 4.8   CHLORIDE mmol/L 103 104 104   CO2 mmol/L 23.0 23.9 21.0*   BUN mg/dL 32* 33* 30*   CREATININE mg/dL 1.34* 1.42* 1.46*   GLUCOSE mg/dL 203* 210* 167*   CALCIUM mg/dL 9.3 9.1 9.4     Results from last 7 days   Lab Units 01/18/20  0607 01/17/20  0539 01/15/20  0646   WBC 10*3/mm3 5.52 6.01 5.54   HEMOGLOBIN g/dL 15.0 15.4 15.1   HEMATOCRIT % 45.8 47.0 46.0   PLATELETS 10*3/mm3 106* 109* 101*     Results from last 7 days   Lab Units 01/15/20  0646   TSH uIU/mL 3.250     Results from last 7 days   Lab Units 01/14/20  0516   HEMOGLOBIN A1C % 7.40*     Lab Results   Lab Value Date/Time    TROPONINT <0.010 01/13/2020 0747    TROPONINT <0.010 03/30/2017 0809     Results  from last 7 days   Lab Units 01/14/20  0516   CHOLESTEROL mg/dL 154   TRIGLYCERIDES mg/dL 161*   HDL CHOL mg/dL 34*   LDL CHOL mg/dL 88     Results from last 7 days   Lab Units 01/13/20  0747   ALK PHOS U/L 80   BILIRUBIN mg/dL 0.5   ALT (SGPT) U/L 20   AST (SGOT) U/L 18     Results from last 7 days   Lab Units 01/15/20  0646   TSH uIU/mL 3.250     Results from last 7 days   Lab Units 01/14/20  0516   HEMOGLOBIN A1C % 7.40*     Glucose   Date/Time Value Ref Range Status   01/18/2020 1114 303 (H) 70 - 130 mg/dL Final   01/18/2020 0633 217 (H) 70 - 130 mg/dL Final   01/17/2020 2102 279 (H) 70 - 130 mg/dL Final   01/17/2020 1617 244 (H) 70 - 130 mg/dL Final   01/17/2020 1056 233 (H) 70 - 130 mg/dL Final   01/17/2020 0621 187 (H) 70 - 130 mg/dL Final   01/16/2020 2136 244 (H) 70 - 130 mg/dL Final   01/16/2020 1708 285 (H) 70 - 130 mg/dL Final     Results from last 7 days   Lab Units 01/18/20  0607 01/17/20  0539 01/16/20  0559   INR  1.94* 1.50* 1.59*       Past Medical History:   Diagnosis Date   • Abnormal electrocardiogram    • Allergic rhinitis    • Aortic valve insufficiency    • Ascending aortic aneurysm (CMS/HCC)    • Atrial fibrillation (CMS/HCC)    • Bacteremia    • Calcific aortic stenosis of bicuspid valve    • Cardiac arrest (CMS/HCC)    • Cardiomyopathy (CMS/HCC)    • Contact dermatitis due to poison ivy    • Diabetes mellitus (CMS/HCC)    • Elbow fracture    • Esophageal reflux    • GERD (gastroesophageal reflux disease)    • Head injury    • Hyperglycemia    • Hyperlipidemia    • Hypertension    • Kidney carcinoma (CMS/HCC)    • Leg swelling    • Localized swelling of both lower legs    • Nasal congestion    • Nasal drainage    • Nonischemic cardiomyopathy (CMS/HCC)    • Palpitations    • Pedal edema    • Pleural effusion on left    • Renal insufficiency syndrome    • Renal oncocytoma    • Seasonal allergic reaction    • Sinusitis    • Syncope    • Type 2 diabetes mellitus (CMS/HCC)     uncontrolled   •  Vision changes    • Visual field defect        Assessment:  Active Hospital Problems    Diagnosis  POA   • BYRON (acute kidney injury) (CMS/[facility]) [N17.9]  Yes   • Acute cerebral infarction (CMS/[facility]) [I63.9]  Yes   • Stroke-like symptom [R29.90]  Yes   • Kidney carcinoma (CMS/[facility]) [C64.9]  Yes   • CKD (chronic kidney disease) stage 3, GFR 30-59 ml/min (CMS/[facility]) [N18.3]  Yes   • Hx of aortic valve replacement, mechanical [Z95.2] [Z95.2]  Not Applicable   • Uncontrolled type 2 diabetes mellitus (CMS/[facility]) [E11.65]  Yes   • Hyperlipidemia [E78.5]  Yes   • Atrial fibrillation (CMS/[facility]) [I48.91]  Yes   • Hypertension [I10]  Yes      Resolved Hospital Problems   No resolved problems to display.       Plan:  Continue anticoagulation. As per cardiology and neurology.. Follow lab.  Coumadin per pharmacy.  Cardiology wants INR 3 or greater.    Leobardo Juarez MD  1/18/2020  4:33 PM

## 2020-01-18 NOTE — PLAN OF CARE
Problem: Patient Care Overview  Goal: Plan of Care Review  Outcome: Ongoing (interventions implemented as appropriate)  Flowsheets (Taken 1/18/2020 1031)  Plan of Care Reviewed With: patient;spouse  Outcome Summary: Pt ambulated 150' with Rwx and continued difficulty with LLE weakness. His knee hyperextension and foot drop worsen by the end of 150 foot walk. [facility] was contact regarding a plantarflexion stop AFO this morning so hopefully he will get that soon. Pt will cont to benefit from PT in the hospital and upon DC.

## 2020-01-18 NOTE — THERAPY TREATMENT NOTE
Patient Name: Francisco Sequeira  : 1935    MRN: [id number]                              Today's Date: 2020       Admit Date: 2020    Visit Dx:     ICD-10-CM ICD-9-CM   1. Stroke-like symptom R29.90 781.99   2. H/O mechanical aortic valve replacement Z95.2 V43.3   3. Current use of long term anticoagulation Z79.01 V58.61   4. Subtherapeutic international normalized ratio (INR) R79.1 790.92   5. Acute kidney injury (CMS/HCC) N17.9 584.9     Patient Active Problem List   Diagnosis   • Atrial fibrillation (CMS/HCC)   • Hypertension   • Atopic rhinitis   • Uncontrolled type 2 diabetes mellitus (CMS/HCC)   • Gastroesophageal reflux disease   • Hyperlipidemia   • Renal insufficiency   • Low testosterone   • Special screening for malignant neoplasm of prostate   • Hx of aortic valve replacement, mechanical [Z95.2]   • Screening for iron deficiency anemia   • Stroke-like symptom   • Kidney carcinoma (CMS/HCC)   • CKD (chronic kidney disease) stage 3, GFR 30-59 ml/min (CMS/HCC)   • BYRON (acute kidney injury) (CMS/HCC)   • Acute cerebral infarction (CMS/HCC)     Past Medical History:   Diagnosis Date   • Abnormal electrocardiogram    • Allergic rhinitis    • Aortic valve insufficiency    • Ascending aortic aneurysm (CMS/HCC)    • Atrial fibrillation (CMS/HCC)    • Bacteremia    • Calcific aortic stenosis of bicuspid valve    • Cardiac arrest (CMS/HCC)    • Cardiomyopathy (CMS/HCC)    • Contact dermatitis due to poison ivy    • Diabetes mellitus (CMS/HCC)    • Elbow fracture    • Esophageal reflux    • GERD (gastroesophageal reflux disease)    • Head injury    • Hyperglycemia    • Hyperlipidemia    • Hypertension    • Kidney carcinoma (CMS/HCC)    • Leg swelling    • Localized swelling of both lower legs    • Nasal congestion    • Nasal drainage    • Nonischemic cardiomyopathy (CMS/HCC)    • Palpitations    • Pedal edema    • Pleural effusion on left    • Renal insufficiency syndrome    • Renal oncocytoma    •  Seasonal allergic reaction    • Sinusitis    • Syncope    • Type 2 diabetes mellitus (CMS/HCC)     uncontrolled   • Vision changes    • Visual field defect      Past Surgical History:   Procedure Laterality Date   • AORTIC VALVE REPAIR/REPLACEMENT     • ASCENDING AORTIC ANEURYSM REPAIR W/ MECHANICAL AORTIC VALVE REPLACEMENT     • NEPHRECTOMY     • OTHER SURGICAL HISTORY      elbow surgery   • PROSTATE SURGERY     • THORACENTESIS Left     diagnostic     General Information     Row Name 01/18/20 1025          PT Evaluation Time/Intention    Document Type  therapy note (daily note)  -     Mode of Treatment  physical therapy  -     Row Name 01/18/20 1025          General Information    Patient Profile Reviewed?  yes  -     Existing Precautions/Restrictions  fall  -     Row Name 01/18/20 1025          Cognitive Assessment/Intervention- PT/OT    Orientation Status (Cognition)  oriented x 4  -     Cognitive Assessment/Intervention Comment  Pt agreeable to PT. Wife present for session.  -       User Key  (r) = Recorded By, (t) = Taken By, (c) = Cosigned By    Initials Name Provider Type     Angela Key, PT Physical Therapist        Mobility     Row Name 01/18/20 1026          Bed Mobility Assessment/Treatment    Comment (Bed Mobility)  UIC  -     Row Name 01/18/20 1026          Sit-Stand Transfer    Sit-Stand [place] (Transfers)  stand by assist  -     Assistive Device (Sit-Stand Transfers)  walker, front-wheeled  -     Row Name 01/18/20 1026          Gait/Stairs Assessment/Training    [place] Level (Gait)  contact guard  -     Assistive Device (Gait)  walker, front-wheeled  -     Pattern (Gait)  step-through  -     Deviations/Abnormal Patterns (Gait)  ataxic;gait speed decreased  -     Left Sided Gait Deviations  foot drop/toe drag;knee hyperextension;heel strike decreased  -     Comment (Gait/Stairs)  continued L knee hyperextension and difficulty with foot drop  -       User Key   (r) = Recorded By, (t) = Taken By, (c) = Cosigned By    Initials Name Provider Type    Angela Pantoja, CICI Physical Therapist        Obj/Interventions     Row Name 01/18/20 1028          Static Sitting Balance    Comment (Unsupported Sitting, Static Balance)  standing ankle DF/PF, mini squats, high march, seated LAQ (L) - all x 10  -       User Key  (r) = Recorded By, (t) = Taken By, (c) = Cosigned By    Initials Name Provider Type    Angela Pantoja, CICI Physical Therapist        Goals/Plan    No documentation.       Clinical Impression     Row Name 01/18/20 1031          Pain Scale: Numbers Pre/Post-Treatment    Pain Scale: Numbers, Pretreatment  0/10 - no pain  -     Pain Scale: Numbers, Post-Treatment  0/10 - no pain  -     Row Name 01/18/20 1031          Plan of Care Review    Plan of Care Reviewed With  patient;spouse  -     Progress  improving  -     Outcome Summary  Pt ambulated 150' with Rwx and continued difficulty with LLE weakness. His knee hyperextension and foot drop worsen by the end of 150 foot walk. [facility] was contact regarding a plantarflexion stop AFO this morning so hopefully he will get that soon. Pt will cont to benefit from PT in the hospital and upon DC.  -     Row Name 01/18/20 1031          Physical Therapy Clinical Impression    Criteria for Skilled Interventions Met (PT Clinical Impression)  yes;treatment indicated  -     Rehab Potential (PT Clinical Summary)  good, to achieve stated therapy goals  -     Row Name 01/18/20 1031          Positioning and Restraints    Pre-Treatment Position  sitting in chair/recliner  -     Post Treatment Position  chair  -LC     In Chair  sitting;call light within reach;encouraged to call for assist;with family/caregiver  -       User Key  (r) = Recorded By, (t) = Taken By, (c) = Cosigned By    Initials Name Provider Type    Angela Pantoja, CICI Physical Therapist        Outcome Measures     Row Name 01/18/20 1033          How much help from  another person do you currently need...    Turning from your back to your side while in flat bed without using bedrails?  4  -LC     Moving from lying on back to sitting on the side of a flat bed without bedrails?  3  -LC     Moving to and from a bed to a chair (including a wheelchair)?  4  -LC     Standing up from a chair using your arms (e.g., wheelchair, bedside chair)?  4  -LC     Climbing 3-5 steps with a railing?  2  -LC     To walk in hospital room?  3  -LC     AM-PAC 6 Clicks Score (PT)  20  -     Row Name 01/18/20 1033          Modified [place] Scale    Pre-Stroke Modified [place] Scale  0 - No Symptoms at all.  -LC     Modified [place] Scale  4 - Moderately severe disability.  Unable to walk without assistance, and unable to attend to own bodily needs without assistance.  -       User Key  (r) = Recorded By, (t) = Taken By, (c) = Cosigned By    Initials Name Provider Type    Angela Pantoja, PT Physical Therapist          PT Recommendation and Plan     Outcome Summary/Treatment Plan (PT)  Anticipated Equipment Needs at Discharge (PT): front wheeled walker, other (see comments)(plantarflexion stop AFO Left)  Plan of Care Reviewed With: patient, spouse  Progress: improving  Outcome Summary: Pt ambulated 150' with Rwx and continued difficulty with LLE weakness. His knee hyperextension and foot drop worsen by the end of 150 foot walk. [facility] was contact regarding a plantarflexion stop AFO this morning so hopefully he will get that soon. Pt will cont to benefit from PT in the hospital and upon DC.     Time Calculation:   PT Charges     Row Name 01/18/20 1034             Time Calculation    Start Time  1000  -      Stop Time  1015  -      Time Calculation (min)  15 min  -      PT Received On  01/18/20  -      PT - Next Appointment  01/19/20  -        User Key  (r) = Recorded By, (t) = Taken By, (c) = Cosigned By    Initials Name Provider Type    Angela Pantoja, CICI Physical Therapist        Therapy  Charges for Today     Code Description Service Date Service Provider Modifiers Qty    [id number] HC GAIT TRAINING EA 15 MIN 1/18/2020 Angela Key, PT GP 1          PT G-Codes  Outcome Measure Options: AM-PAC 6 Clicks Basic Mobility (PT)  AM-PAC 6 Clicks Score (PT): 20  AM-PAC 6 Clicks Score (OT): 18  Modified [place] Scale: 4 - Moderately severe disability.  Unable to walk without assistance, and unable to attend to own bodily needs without assistance.    Angela Key, PT  1/18/2020

## 2020-01-19 LAB
ANION GAP SERPL CALCULATED.3IONS-SCNC: 12.5 MMOL/L (ref 5–15)
BASOPHILS # BLD AUTO: 0.04 10*3/MM3 (ref 0–0.2)
BASOPHILS NFR BLD AUTO: 0.8 % (ref 0–1.5)
BUN BLD-MCNC: 33 MG/DL (ref 8–23)
BUN/CREAT SERPL: 24.4 (ref 7–25)
CALCIUM SPEC-SCNC: 9.2 MG/DL (ref 8.6–10.5)
CHLORIDE SERPL-SCNC: 100 MMOL/L (ref 98–107)
CO2 SERPL-SCNC: 24.5 MMOL/L (ref 22–29)
CREAT BLD-MCNC: 1.35 MG/DL (ref 0.76–1.27)
DEPRECATED RDW RBC AUTO: 42.7 FL (ref 37–54)
EOSINOPHIL # BLD AUTO: 0.1 10*3/MM3 (ref 0–0.4)
EOSINOPHIL NFR BLD AUTO: 2.1 % (ref 0.3–6.2)
ERYTHROCYTE [DISTWIDTH] IN BLOOD BY AUTOMATED COUNT: 13.7 % (ref 12.3–15.4)
GFR SERPL CREATININE-BSD FRML MDRD: 51 ML/MIN/1.73
GLUCOSE BLD-MCNC: 302 MG/DL (ref 65–99)
GLUCOSE BLDC GLUCOMTR-MCNC: 177 MG/DL (ref 70–130)
GLUCOSE BLDC GLUCOMTR-MCNC: 254 MG/DL (ref 70–130)
GLUCOSE BLDC GLUCOMTR-MCNC: 262 MG/DL (ref 70–130)
GLUCOSE BLDC GLUCOMTR-MCNC: 354 MG/DL (ref 70–130)
HCT VFR BLD AUTO: 45 % (ref 37.5–51)
HGB BLD-MCNC: 15 G/DL (ref 13–17.7)
IMM GRANULOCYTES # BLD AUTO: 0.01 10*3/MM3 (ref 0–0.05)
IMM GRANULOCYTES NFR BLD AUTO: 0.2 % (ref 0–0.5)
INR PPP: 2.34 (ref 0.9–1.1)
LYMPHOCYTES # BLD AUTO: 0.99 10*3/MM3 (ref 0.7–3.1)
LYMPHOCYTES NFR BLD AUTO: 20.6 % (ref 19.6–45.3)
MCH RBC QN AUTO: 28.5 PG (ref 26.6–33)
MCHC RBC AUTO-ENTMCNC: 33.3 G/DL (ref 31.5–35.7)
MCV RBC AUTO: 85.4 FL (ref 79–97)
MONOCYTES # BLD AUTO: 0.35 10*3/MM3 (ref 0.1–0.9)
MONOCYTES NFR BLD AUTO: 7.3 % (ref 5–12)
NEUTROPHILS # BLD AUTO: 3.32 10*3/MM3 (ref 1.7–7)
NEUTROPHILS NFR BLD AUTO: 69 % (ref 42.7–76)
NRBC BLD AUTO-RTO: 0 /100 WBC (ref 0–0.2)
PLATELET # BLD AUTO: 129 10*3/MM3 (ref 140–450)
PMV BLD AUTO: 12.3 FL (ref 6–12)
POTASSIUM BLD-SCNC: 4.7 MMOL/L (ref 3.5–5.2)
PROTHROMBIN TIME: 25.3 SECONDS (ref 11.7–14.2)
RBC # BLD AUTO: 5.27 10*6/MM3 (ref 4.14–5.8)
SODIUM BLD-SCNC: 137 MMOL/L (ref 136–145)
WBC NRBC COR # BLD: 4.81 10*3/MM3 (ref 3.4–10.8)

## 2020-01-19 PROCEDURE — 25010000002 ENOXAPARIN PER 10 MG: Performed by: PSYCHIATRY & NEUROLOGY

## 2020-01-19 PROCEDURE — 82962 GLUCOSE BLOOD TEST: CPT

## 2020-01-19 PROCEDURE — 36415 COLL VENOUS BLD VENIPUNCTURE: CPT | Performed by: NURSE PRACTITIONER

## 2020-01-19 PROCEDURE — 85610 PROTHROMBIN TIME: CPT | Performed by: NURSE PRACTITIONER

## 2020-01-19 PROCEDURE — 97116 GAIT TRAINING THERAPY: CPT

## 2020-01-19 PROCEDURE — 85025 COMPLETE CBC W/AUTO DIFF WBC: CPT | Performed by: HOSPITALIST

## 2020-01-19 PROCEDURE — 63710000001 INSULIN LISPRO (HUMAN) PER 5 UNITS: Performed by: NURSE PRACTITIONER

## 2020-01-19 PROCEDURE — 80048 BASIC METABOLIC PNL TOTAL CA: CPT | Performed by: NURSE PRACTITIONER

## 2020-01-19 RX ORDER — WARFARIN SODIUM 7.5 MG/1
7.5 TABLET ORAL
Status: COMPLETED | OUTPATIENT
Start: 2020-01-19 | End: 2020-01-19

## 2020-01-19 RX ADMIN — INSULIN LISPRO 4 UNITS: 100 INJECTION, SOLUTION INTRAVENOUS; SUBCUTANEOUS at 17:27

## 2020-01-19 RX ADMIN — ATORVASTATIN CALCIUM 40 MG: 20 TABLET, FILM COATED ORAL at 09:05

## 2020-01-19 RX ADMIN — SODIUM BICARBONATE 1950 MG: 650 TABLET ORAL at 21:27

## 2020-01-19 RX ADMIN — SODIUM CHLORIDE, PRESERVATIVE FREE 10 ML: 5 INJECTION INTRAVENOUS at 21:31

## 2020-01-19 RX ADMIN — INSULIN LISPRO 6 UNITS: 100 INJECTION, SOLUTION INTRAVENOUS; SUBCUTANEOUS at 21:26

## 2020-01-19 RX ADMIN — INSULIN LISPRO 4 UNITS: 100 INJECTION, SOLUTION INTRAVENOUS; SUBCUTANEOUS at 12:23

## 2020-01-19 RX ADMIN — INSULIN LISPRO 2 UNITS: 100 INJECTION, SOLUTION INTRAVENOUS; SUBCUTANEOUS at 09:04

## 2020-01-19 RX ADMIN — ENOXAPARIN SODIUM 80 MG: 80 INJECTION SUBCUTANEOUS at 06:29

## 2020-01-19 RX ADMIN — ENOXAPARIN SODIUM 80 MG: 80 INJECTION SUBCUTANEOUS at 17:27

## 2020-01-19 RX ADMIN — GUAIFENESIN 600 MG: 600 TABLET, EXTENDED RELEASE ORAL at 09:05

## 2020-01-19 RX ADMIN — WARFARIN SODIUM 7.5 MG: 7.5 TABLET ORAL at 17:27

## 2020-01-19 RX ADMIN — METOPROLOL SUCCINATE 25 MG: 25 TABLET, EXTENDED RELEASE ORAL at 09:06

## 2020-01-19 RX ADMIN — GUAIFENESIN 600 MG: 600 TABLET, EXTENDED RELEASE ORAL at 21:27

## 2020-01-19 RX ADMIN — DIGOXIN 125 MCG: 125 TABLET ORAL at 09:06

## 2020-01-19 RX ADMIN — SODIUM BICARBONATE 1950 MG: 650 TABLET ORAL at 09:05

## 2020-01-19 RX ADMIN — ASPIRIN 325 MG: 325 TABLET ORAL at 09:05

## 2020-01-19 RX ADMIN — Medication 400 MG: at 09:05

## 2020-01-19 RX ADMIN — SODIUM CHLORIDE, PRESERVATIVE FREE 10 ML: 5 INJECTION INTRAVENOUS at 09:06

## 2020-01-19 NOTE — PLAN OF CARE
Problem: Patient Care Overview  Goal: Plan of Care Review  Outcome: Ongoing (interventions implemented as appropriate)  Flowsheets  Taken 1/19/2020 0858  Progress: improving  Outcome Summary: Pt demonstrates improvements with gait using AFO, but continues to have knee hyperextension. He was encouraged to continue strengthening ex's and will benefit from continued PT to work on more high level exercises prior to returning home. Family educated on going to OP PT when HH is getting easy.  Taken 1/19/2020 0901  Plan of Care Reviewed With: patient;spouse

## 2020-01-19 NOTE — PROGRESS NOTES
Pharmacy Consult: Warfarin Dosing/ Monitoring    Francisco Sequeira is a 84 y.o. male, estimated creatinine clearance is 43.7 mL/min (A) (by C-G formula based on SCr of 1.42 mg/dL (H)). weighing 77.1 kg (169 lb 14.4 oz).      Results from last 7 days   Lab Units 01/19/20  0621 01/18/20  0607 01/17/20  0539 01/16/20  0559 01/15/20  0646 01/14/20  0516 01/13/20  0747   INR  2.34* 1.94* 1.50* 1.59* 1.80* 1.99* 2.21*   HEMOGLOBIN g/dL 15.0 15.0 15.4  --  15.1 15.6 14.8   HEMATOCRIT % 45.0 45.8 47.0  --  46.0 47.5 45.2   PLATELETS 10*3/mm3 129* 106* 109*  --  101* 98* 109*     Results from last 7 days   Lab Units 01/18/20  0607 01/17/20  0539 01/16/20  0559  01/13/20  0747   SODIUM mmol/L 138 140 138   < > 143   POTASSIUM mmol/L 4.7 4.7 4.8   < > 4.4   CHLORIDE mmol/L 103 104 104   < > 107   CO2 mmol/L 23.0 23.9 21.0*   < > 25.2   BUN mg/dL 32* 33* 30*   < > 29*   CREATININE mg/dL 1.34* 1.42* 1.46*   < > 2.04*   CALCIUM mg/dL 9.3 9.1 9.4   < > 8.9   BILIRUBIN mg/dL  --   --   --   --  0.5   ALK PHOS U/L  --   --   --   --  80   ALT (SGPT) U/L  --   --   --   --  20   AST (SGOT) U/L  --   --   --   --  18   GLUCOSE mg/dL 203* 210* 167*   < > 68    < > = values in this interval not displayed.     Anticoagulation history: Per last note from [facility] on 12/30, patient's home warfarin regimen is 5 mg qMWF and 7.5 mg TThSSun.     Hospital Anticoagulation:  Consulting provider: LIZA Zayas  Start date: 1/13/20  Indication: Aortic mechanical valve  Target INR: 2.5-3.5  Expected duration: Lifelong  Bridge Therapy: Yes              and enoxapain  Date 1/13 1/14 1/15 1/16 1/17 1/18 1/19      INR 2.21 1.99 1.80 1.59 1.5 1.94 2.34      Warfarin dose  7.5mg 7.5mg 7.5mg 10mg 10 mg 7.5mg        Potential drug interactions:     Relevant nutrition status: reg cardiac diet    Other: looks like dose on 1/13 was never given    Education complete?/ Date: no    Assessment/Plan:  INR goal per cardio is 3-3.5.  Will give 7.5mg X  one today and review again in am.    Temi Llanos PharmD, BCPS

## 2020-01-19 NOTE — PROGRESS NOTES
"DAILY PROGRESS NOTE  [facility]    Patient Identification:  Name: Francisco Sequeira  Age: 84 y.o.  Sex: male  :  1935  MRN: [id number]         Primary Care Physician: Rubin Hinkle APRN    Subjective:  Interval History:He feels better. Walking with PT.    Objective:    Scheduled Meds:    aspirin 325 mg Oral Daily   Or      aspirin 300 mg Rectal Daily   atorvastatin 40 mg Oral Daily   digoxin 125 mcg Oral Daily   enoxaparin 1 mg/kg Subcutaneous Q12H   guaiFENesin 600 mg Oral Q12H   insulin lispro 0-7 Units Subcutaneous 4x Daily With Meals & Nightly   magnesium oxide 400 mg Oral Daily   metoprolol succinate XL 25 mg Oral Daily   sodium bicarbonate 1,950 mg Oral BID   sodium chloride 10 mL Intravenous Q12H   warfarin 7.5 mg Oral Daily     Continuous Infusions:    Pharmacy to dose warfarin        Vital signs in last 24 hours:  Temp:  [97.4 °F (36.3 °C)-97.6 °F (36.4 °C)] 97.6 °F (36.4 °C)  Heart Rate:  [66-78] 67  Resp:  [18] 18  BP: (124-173)/(69-89) 142/69    Intake/Output:    Intake/Output Summary (Last 24 hours) at 2020 1418  Last data filed at 2020 1345  Gross per 24 hour   Intake 960 ml   Output 900 ml   Net 60 ml       Exam:  /69 (BP Location: Right arm, Patient Position: Sitting)   Pulse 67   Temp 97.6 °F (36.4 °C) (Oral)   Resp 18   Ht 182 cm (71.65\")   Wt 93.9 kg (207 lb 1.6 oz)   SpO2 97%   BMI 28.36 kg/m²     General Appearance:    Alert, cooperative, no distress   Head:    Normocephalic, without obvious abnormality, atraumatic   Eyes:       Throat:   Lips, tongue, gums normal   Neck:   Supple, symmetrical, trachea midline, no JVD   Lungs:     Clear to auscultation bilaterally, respirations unlabored   Chest Wall:    No tenderness or deformity    Heart:    Regular rate and rhythm, S1 and S2 normal, no murmur,no  Rub or gallop   Abdomen:     Soft, non-tender, bowel sounds active, no masses, no organomegaly    Extremities:   Extremities normal, atraumatic, no " cyanosis or edema   Pulses:      Skin:   Skin is warm and dry,  no rashes or palpable lesions   Neurologic:   no focal deficits noted      Lab Results (last 72 hours)     Procedure Component Value Units Date/Time    POC Glucose Once [[id number]]  (Abnormal) Collected:  01/17/20 1056    Specimen:  Blood Updated:  01/17/20 1057     Glucose 233 mg/dL     POC Glucose Once [[id number]]  (Abnormal) Collected:  01/17/20 0621    Specimen:  Blood Updated:  01/17/20 0623     Glucose 187 mg/dL     Basic Metabolic Panel [[id number]]  (Abnormal) Collected:  01/17/20 0539    Specimen:  Blood Updated:  01/17/20 0618     Glucose 210 mg/dL      BUN 33 mg/dL      Creatinine 1.42 mg/dL      Sodium 140 mmol/L      Potassium 4.7 mmol/L      Chloride 104 mmol/L      CO2 23.9 mmol/L      Calcium 9.1 mg/dL      eGFR Non African Amer 48 mL/min/1.73      BUN/Creatinine Ratio 23.2     Anion Gap 12.1 mmol/L     Narrative:       GFR Normal >60  Chronic Kidney Disease <60  Kidney Failure <15      Protime-INR [[id number]]  (Abnormal) Collected:  01/17/20 0539    Specimen:  Blood Updated:  01/17/20 0604     Protime 17.8 Seconds      INR 1.50    CBC & Differential [[id number]] Collected:  01/17/20 0539    Specimen:  Blood Updated:  01/17/20 0554    Narrative:       The following orders were created for panel order CBC & Differential.  Procedure                               Abnormality         Status                     ---------                               -----------         ------                     CBC Auto Differential[[id number]]        Abnormal            Final result                 Please view results for these tests on the individual orders.    CBC Auto Differential [[id number]]  (Abnormal) Collected:  01/17/20 0539    Specimen:  Blood Updated:  01/17/20 0554     WBC 6.01 10*3/mm3      RBC 5.36 10*6/mm3      Hemoglobin 15.4 g/dL      Hematocrit 47.0 %      MCV 87.7 fL      MCH 28.7 pg      MCHC 32.8 g/dL      RDW 13.9 %      RDW-SD 44.3 fl         MPV 12.2 fL      Platelets 109 10*3/mm3      Neutrophil % 75.4 %      Lymphocyte % 15.3 %      Monocyte % 7.0 %      Eosinophil % 1.5 %      Basophil % 0.5 %      Immature Grans % 0.3 %      Neutrophils, Absolute 4.53 10*3/mm3      Lymphocytes, Absolute 0.92 10*3/mm3      Monocytes, Absolute 0.42 10*3/mm3      Eosinophils, Absolute 0.09 10*3/mm3      Basophils, Absolute 0.03 10*3/mm3      Immature Grans, Absolute 0.02 10*3/mm3      nRBC 0.0 /100 WBC     POC Glucose Once [[id number]]  (Abnormal) Collected:  01/16/20 2136    Specimen:  Blood Updated:  01/16/20 2137     Glucose 244 mg/dL     POC Glucose Once [[id number]]  (Abnormal) Collected:  01/16/20 1708    Specimen:  Blood Updated:  01/16/20 1712     Glucose 285 mg/dL     Basic Metabolic Panel [[id number]]  (Abnormal) Collected:  01/16/20 0559    Specimen:  Blood Updated:  01/16/20 0651     Glucose 167 mg/dL      BUN 30 mg/dL      Creatinine 1.46 mg/dL      Sodium 138 mmol/L      Potassium 4.8 mmol/L      Chloride 104 mmol/L      CO2 21.0 mmol/L      Calcium 9.4 mg/dL      eGFR Non African Amer 46 mL/min/1.73      BUN/Creatinine Ratio 20.5     Anion Gap 13.0 mmol/L     Narrative:       GFR Normal >60  Chronic Kidney Disease <60  Kidney Failure <15      POC Glucose Once [[id number]]  (Abnormal) Collected:  01/16/20 0643    Specimen:  Blood Updated:  01/16/20 0644     Glucose 157 mg/dL     Protime-INR [[id number]]  (Abnormal) Collected:  01/16/20 0559    Specimen:  Blood Updated:  01/16/20 0640     Protime 18.6 Seconds      INR 1.59    POC Glucose Once [[id number]]  (Abnormal) Collected:  01/15/20 2134    Specimen:  Blood Updated:  01/15/20 2136     Glucose 238 mg/dL     POC Glucose Once [[id number]]  (Abnormal) Collected:  01/15/20 1655    Specimen:  Blood Updated:  01/15/20 1709     Glucose 200 mg/dL     POC Glucose Once [[id number]]  (Abnormal) Collected:  01/15/20 1123    Specimen:  Blood Updated:  01/15/20 1133     Glucose 222 mg/dL     Vitamin B12 [[id number]]   (Normal) Collected:  01/13/20 0747    Specimen:  Blood Updated:  01/15/20 1115     Vitamin B-12 381 pg/mL     Narrative:       Results may be falsely increased if patient taking Biotin.      Folate [[id number]]  (Normal) Collected:  01/13/20 0747    Specimen:  Blood Updated:  01/15/20 1115     Folate 9.32 ng/mL     Narrative:       Results may be falsely increased if patient taking Biotin.      TSH [[id number]]  (Normal) Collected:  01/15/20 0646    Specimen:  Blood from Hand, Left Updated:  01/15/20 1106     TSH 3.250 uIU/mL     Basic Metabolic Panel [[id number]]  (Abnormal) Collected:  01/15/20 0646    Specimen:  Blood from Hand, Left Updated:  01/15/20 0726     Glucose 131 mg/dL      BUN 25 mg/dL      Creatinine 1.42 mg/dL      Sodium 137 mmol/L      Potassium 4.8 mmol/L      Chloride 104 mmol/L      CO2 22.2 mmol/L      Calcium 9.0 mg/dL      eGFR Non African Amer 48 mL/min/1.73      BUN/Creatinine Ratio 17.6     Anion Gap 10.8 mmol/L     Narrative:       GFR Normal >60  Chronic Kidney Disease <60  Kidney Failure <15      Protime-INR [[id number]]  (Abnormal) Collected:  01/15/20 0646    Specimen:  Blood from Hand, Left Updated:  01/15/20 0719     Protime 20.6 Seconds      INR 1.80    CBC & Differential [[id number]] Collected:  01/15/20 0646    Specimen:  Blood from Hand, Left Updated:  01/15/20 0703    Narrative:       The following orders were created for panel order CBC & Differential.  Procedure                               Abnormality         Status                     ---------                               -----------         ------                     CBC Auto Differential[[id number]]        Abnormal            Final result                 Please view results for these tests on the individual orders.    CBC Auto Differential [[id number]]  (Abnormal) Collected:  01/15/20 0646    Specimen:  Blood from Hand, Left Updated:  01/15/20 0703     WBC 5.54 10*3/mm3      RBC 5.35 10*6/mm3      Hemoglobin 15.1 g/dL       Hematocrit 46.0 %      MCV 86.0 fL      MCH 28.2 pg      MCHC 32.8 g/dL      RDW 13.7 %      RDW-SD 42.8 fl      MPV 12.1 fL      Platelets 101 10*3/mm3      Neutrophil % 69.1 %      Lymphocyte % 20.9 %      Monocyte % 6.9 %      Eosinophil % 2.3 %      Basophil % 0.4 %      Immature Grans % 0.4 %      Neutrophils, Absolute 3.83 10*3/mm3      Lymphocytes, Absolute 1.16 10*3/mm3      Monocytes, Absolute 0.38 10*3/mm3      Eosinophils, Absolute 0.13 10*3/mm3      Basophils, Absolute 0.02 10*3/mm3      Immature Grans, Absolute 0.02 10*3/mm3      nRBC 0.0 /100 WBC     POC Glucose Once [[id number]]  (Normal) Collected:  01/15/20 0602    Specimen:  Blood Updated:  01/15/20 0606     Glucose 120 mg/dL     POC Glucose Once [[id number]]  (Abnormal) Collected:  01/14/20 2022    Specimen:  Blood Updated:  01/14/20 2026     Glucose 246 mg/dL     C-reactive Protein [[id number]]  (Normal) Collected:  01/14/20 1820    Specimen:  Blood Updated:  01/14/20 1853     C-Reactive Protein 0.30 mg/dL     POC Glucose Once [[id number]]  (Abnormal) Collected:  01/14/20 1717    Specimen:  Blood Updated:  01/14/20 1724     Glucose 153 mg/dL         Data Review:  Results from last 7 days   Lab Units 01/19/20  0838 01/18/20  0607 01/17/20  0539   SODIUM mmol/L 137 138 140   POTASSIUM mmol/L 4.7 4.7 4.7   CHLORIDE mmol/L 100 103 104   CO2 mmol/L 24.5 23.0 23.9   BUN mg/dL 33* 32* 33*   CREATININE mg/dL 1.35* 1.34* 1.42*   GLUCOSE mg/dL 302* 203* 210*   CALCIUM mg/dL 9.2 9.3 9.1     Results from last 7 days   Lab Units 01/19/20  0621 01/18/20  0607 01/17/20  0539   WBC 10*3/mm3 4.81 5.52 6.01   HEMOGLOBIN g/dL 15.0 15.0 15.4   HEMATOCRIT % 45.0 45.8 47.0   PLATELETS 10*3/mm3 129* 106* 109*     Results from last 7 days   Lab Units 01/15/20  0646   TSH uIU/mL 3.250     Results from last 7 days   Lab Units 01/14/20  0516   HEMOGLOBIN A1C % 7.40*     Lab Results   Lab Value Date/Time    TROPONINT <0.010 01/13/2020 0747    TROPONINT <0.010 03/30/2017 0809         Results from last 7 days   Lab Units 01/14/20  0516   CHOLESTEROL mg/dL 154   TRIGLYCERIDES mg/dL 161*   HDL CHOL mg/dL 34*   LDL CHOL mg/dL 88     Results from last 7 days   Lab Units 01/13/20  0747   ALK PHOS U/L 80   BILIRUBIN mg/dL 0.5   ALT (SGPT) U/L 20   AST (SGOT) U/L 18     Results from last 7 days   Lab Units 01/15/20  0646   TSH uIU/mL 3.250     Results from last 7 days   Lab Units 01/14/20  0516   HEMOGLOBIN A1C % 7.40*     Glucose   Date/Time Value Ref Range Status   01/19/2020 1114 254 (H) 70 - 130 mg/dL Final   01/19/2020 0623 177 (H) 70 - 130 mg/dL Final   01/18/2020 2107 305 (H) 70 - 130 mg/dL Final   01/18/2020 1655 262 (H) 70 - 130 mg/dL Final   01/18/2020 1114 303 (H) 70 - 130 mg/dL Final   01/18/2020 0633 217 (H) 70 - 130 mg/dL Final   01/17/2020 2102 279 (H) 70 - 130 mg/dL Final   01/17/2020 1617 244 (H) 70 - 130 mg/dL Final     Results from last 7 days   Lab Units 01/19/20  0621 01/18/20  0607 01/17/20  0539   INR  2.34* 1.94* 1.50*       Past Medical History:   Diagnosis Date   • Abnormal electrocardiogram    • Allergic rhinitis    • Aortic valve insufficiency    • Ascending aortic aneurysm (CMS/HCC)    • Atrial fibrillation (CMS/HCC)    • Bacteremia    • Calcific aortic stenosis of bicuspid valve    • Cardiac arrest (CMS/HCC)    • Cardiomyopathy (CMS/HCC)    • Contact dermatitis due to poison ivy    • Diabetes mellitus (CMS/HCC)    • Elbow fracture    • Esophageal reflux    • GERD (gastroesophageal reflux disease)    • Head injury    • Hyperglycemia    • Hyperlipidemia    • Hypertension    • Kidney carcinoma (CMS/HCC)    • Leg swelling    • Localized swelling of both lower legs    • Nasal congestion    • Nasal drainage    • Nonischemic cardiomyopathy (CMS/HCC)    • Palpitations    • Pedal edema    • Pleural effusion on left    • Renal insufficiency syndrome    • Renal oncocytoma    • Seasonal allergic reaction    • Sinusitis    • Syncope    • Type 2 diabetes mellitus (CMS/HCC)      uncontrolled   • Vision changes    • Visual field defect        Assessment:  Active Hospital Problems    Diagnosis  POA   • BYRON (acute kidney injury) (CMS/[facility]) [N17.9]  Yes   • Acute cerebral infarction (CMS/[facility]) [I63.9]  Yes   • Stroke-like symptom [R29.90]  Yes   • Kidney carcinoma (CMS/[facility]) [C64.9]  Yes   • CKD (chronic kidney disease) stage 3, GFR 30-59 ml/min (CMS/[facility]) [N18.3]  Yes   • Hx of aortic valve replacement, mechanical [Z95.2] [Z95.2]  Not Applicable   • Uncontrolled type 2 diabetes mellitus (CMS/[facility]) [E11.65]  Yes   • Hyperlipidemia [E78.5]  Yes   • Atrial fibrillation (CMS/[facility]) [I48.91]  Yes   • Hypertension [I10]  Yes      Resolved Hospital Problems   No resolved problems to display.       Plan:  Continue anticoagulation. As per cardiology and neurology.. Follow lab.  Coumadin per pharmacy.  Cardiology wants INR 3 or greater before DC.    Leobardo Juarez MD  1/19/2020  2:18 PM

## 2020-01-19 NOTE — THERAPY TREATMENT NOTE
Patient Name: Francisco Sequeira  : 1935    MRN: [id number]                              Today's Date: 2020       Admit Date: 2020    Visit Dx:     ICD-10-CM ICD-9-CM   1. Stroke-like symptom R29.90 781.99   2. H/O mechanical aortic valve replacement Z95.2 V43.3   3. Current use of long term anticoagulation Z79.01 V58.61   4. Subtherapeutic international normalized ratio (INR) R79.1 790.92   5. Acute kidney injury (CMS/HCC) N17.9 584.9   6. Acute cerebral infarction (CMS/HCC) I63.9 434.91     Patient Active Problem List   Diagnosis   • Atrial fibrillation (CMS/HCC)   • Hypertension   • Atopic rhinitis   • Uncontrolled type 2 diabetes mellitus (CMS/HCC)   • Gastroesophageal reflux disease   • Hyperlipidemia   • Renal insufficiency   • Low testosterone   • Special screening for malignant neoplasm of prostate   • Hx of aortic valve replacement, mechanical [Z95.2]   • Screening for iron deficiency anemia   • Stroke-like symptom   • Kidney carcinoma (CMS/HCC)   • CKD (chronic kidney disease) stage 3, GFR 30-59 ml/min (CMS/HCC)   • BYRON (acute kidney injury) (CMS/HCC)   • Acute cerebral infarction (CMS/HCC)     Past Medical History:   Diagnosis Date   • Abnormal electrocardiogram    • Allergic rhinitis    • Aortic valve insufficiency    • Ascending aortic aneurysm (CMS/HCC)    • Atrial fibrillation (CMS/HCC)    • Bacteremia    • Calcific aortic stenosis of bicuspid valve    • Cardiac arrest (CMS/HCC)    • Cardiomyopathy (CMS/HCC)    • Contact dermatitis due to poison ivy    • Diabetes mellitus (CMS/HCC)    • Elbow fracture    • Esophageal reflux    • GERD (gastroesophageal reflux disease)    • Head injury    • Hyperglycemia    • Hyperlipidemia    • Hypertension    • Kidney carcinoma (CMS/HCC)    • Leg swelling    • Localized swelling of both lower legs    • Nasal congestion    • Nasal drainage    • Nonischemic cardiomyopathy (CMS/HCC)    • Palpitations    • Pedal edema    • Pleural effusion on left    •  Renal insufficiency syndrome    • Renal oncocytoma    • Seasonal allergic reaction    • Sinusitis    • Syncope    • Type 2 diabetes mellitus (CMS/HCC)     uncontrolled   • Vision changes    • Visual field defect      Past Surgical History:   Procedure Laterality Date   • AORTIC VALVE REPAIR/REPLACEMENT     • ASCENDING AORTIC ANEURYSM REPAIR W/ MECHANICAL AORTIC VALVE REPLACEMENT     • NEPHRECTOMY     • OTHER SURGICAL HISTORY      elbow surgery   • PROSTATE SURGERY     • THORACENTESIS Left     diagnostic     General Information     Row Name 01/19/20 0855          PT Evaluation Time/Intention    Document Type  therapy note (daily note)  -     Mode of Treatment  physical therapy  -     Row Name 01/19/20 0855          General Information    Patient Profile Reviewed?  yes  -     Row Name 01/19/20 0855          Cognitive Assessment/Intervention- PT/OT    Orientation Status (Cognition)  oriented x 4  -     Cognitive Assessment/Intervention Comment  Pt agreeable to PT. Wife present for session.  -     Row Name 01/19/20 0855          Safety Issues, Functional Mobility    Comment, Safety Issues/Impairments (Mobility)  PF stop AFO and gait belt applied for gait training  -       User Key  (r) = Recorded By, (t) = Taken By, (c) = Cosigned By    Initials Name Provider Type     Angela Key, PT Physical Therapist        Mobility     Row Name 01/19/20 0856          Bed Mobility Assessment/Treatment    Comment (Bed Mobility)  UIC  -     Row Name 01/19/20 0856          Sit-Stand Transfer    Sit-Stand [place] (Transfers)  conditional independence;supervision  -     Assistive Device (Sit-Stand Transfers)  walker, front-wheeled  -     Row Name 01/19/20 0856          Gait/Stairs Assessment/Training    Gait/Stairs Assessment/Training  gait/ambulation independence  -     [place] Level (Gait)  conditional independence;supervision  -     Assistive Device (Gait)  walker, front-wheeled  -     Distance in  Feet (Gait)  150'  -     Pattern (Gait)  step-through  -     Deviations/Abnormal Patterns (Gait)  ataxic;gait speed decreased  -     Left Sided Gait Deviations  foot drop/toe drag;knee hyperextension  -     Comment (Gait/Stairs)  AFO helped with foot drop but pt continues to have L knee hyperextension  -       User Key  (r) = Recorded By, (t) = Taken By, (c) = Cosigned By    Initials Name Provider Type    Angela Pantoja, CICI Physical Therapist        Obj/Interventions     Row Name 01/19/20 0857          Therapeutic Exercise    Comment (Therapeutic Exercise)  continue LAQ 3-5 times per day - up quick, hold, down slow x 10  -       User Key  (r) = Recorded By, (t) = Taken By, (c) = Cosigned By    Initials Name Provider Type    Angela Pantoja, CICI Physical Therapist        Goals/Plan    No documentation.       Clinical Impression     Row Name 01/19/20 0858          Plan of Care Review    Plan of Care Reviewed With  patient  -     Progress  improving  -     Outcome Summary  Pt demonstrates improvements with gait using AFO, but continues to have knee hyperextension. He was encouraged to continue strengthening ex's and will benefit from continued PT to work on more high level exercises prior to returning home. Family educated on going to OP PT when HH is getting easy.  -     Row Name 01/19/20 0858          Physical Therapy Clinical Impression    Criteria for Skilled Interventions Met (PT Clinical Impression)  yes;treatment indicated  -     Rehab Potential (PT Clinical Summary)  good, to achieve stated therapy goals  -     Row Name 01/19/20 0858          Positioning and Restraints    Pre-Treatment Position  sitting in chair/recliner  -     Post Treatment Position  chair  -LC     In Chair  sitting;call light within reach;encouraged to call for assist;with family/caregiver  -       User Key  (r) = Recorded By, (t) = Taken By, (c) = Cosigned By    Initials Name Provider Type    Angela Pantoja, CICI  Physical Therapist        Outcome Measures     Row Name 01/19/20 0900          How much help from another person do you currently need...    Turning from your back to your side while in flat bed without using bedrails?  4  -LC     Moving from lying on back to sitting on the side of a flat bed without bedrails?  4  -LC     Moving to and from a bed to a chair (including a wheelchair)?  4  -LC     Standing up from a chair using your arms (e.g., wheelchair, bedside chair)?  4  -LC     Climbing 3-5 steps with a railing?  3  -LC     To walk in hospital room?  4  -LC     AM-PAC 6 Clicks Score (PT)  23  -LC     Row Name 01/19/20 0900          Modified [place] Scale    Pre-Stroke Modified [place] Scale  0 - No Symptoms at all.  -LC     Modified [place] Scale  3 - Moderate disability.  Requiring some help, but able to walk without assistance.  -       User Key  (r) = Recorded By, (t) = Taken By, (c) = Cosigned By    Initials Name Provider Type    Angela Pantoja PT Physical Therapist          PT Recommendation and Plan     Outcome Summary/Treatment Plan (PT)  Anticipated Equipment Needs at Discharge (PT): front wheeled walker, other (see comments)(plantarflexion stop AFO Left)  Plan of Care Reviewed With: patient, spouse  Progress: improving  Outcome Summary: Pt demonstrates improvements with gait using AFO, but continues to have knee hyperextension. He was encouraged to continue strengthening ex's and will benefit from continued PT to work on more high level exercises prior to returning home. Family educated on going to OP PT when HH is getting easy.     Time Calculation:   PT Charges     Row Name 01/19/20 0901             Time Calculation    Start Time  0835  -      Stop Time  0848  -      Time Calculation (min)  13 min  -      PT Received On  01/19/20  -      PT - Next Appointment  01/20/20  -        User Key  (r) = Recorded By, (t) = Taken By, (c) = Cosigned By    Initials Name Provider Type    Angela Pantoja PT  Physical Therapist        Therapy Charges for Today     Code Description Service Date Service Provider Modifiers Qty    47268465616 HC GAIT TRAINING EA 15 MIN 1/18/2020 Angela Key, PT GP 1    54017161398 HC GAIT TRAINING EA 15 MIN 1/19/2020 Angela Key, PT GP 1          PT G-Codes  Outcome Measure Options: AM-PAC 6 Clicks Basic Mobility (PT)  AM-PAC 6 Clicks Score (PT): 23  AM-PAC 6 Clicks Score (OT): 18  Modified [place] Scale: 3 - Moderate disability.  Requiring some help, but able to walk without assistance.    Angela Key, PT  1/19/2020

## 2020-01-19 NOTE — PLAN OF CARE
Problem: Patient Care Overview  Goal: Plan of Care Review  Outcome: Ongoing (interventions implemented as appropriate)  Flowsheets (Taken 1/19/2020 1821)  Outcome Summary: Rested quietly all night, still waiting on INR >3 so can D/C. Monitoring continues.

## 2020-01-20 LAB
ANION GAP SERPL CALCULATED.3IONS-SCNC: 12.5 MMOL/L (ref 5–15)
BASOPHILS # BLD AUTO: 0.04 10*3/MM3 (ref 0–0.2)
BASOPHILS NFR BLD AUTO: 0.8 % (ref 0–1.5)
BUN BLD-MCNC: 29 MG/DL (ref 8–23)
BUN/CREAT SERPL: 21.6 (ref 7–25)
CALCIUM SPEC-SCNC: 9.3 MG/DL (ref 8.6–10.5)
CHLORIDE SERPL-SCNC: 103 MMOL/L (ref 98–107)
CO2 SERPL-SCNC: 23.5 MMOL/L (ref 22–29)
CREAT BLD-MCNC: 1.34 MG/DL (ref 0.76–1.27)
DEPRECATED RDW RBC AUTO: 44.1 FL (ref 37–54)
EOSINOPHIL # BLD AUTO: 0.11 10*3/MM3 (ref 0–0.4)
EOSINOPHIL NFR BLD AUTO: 2.3 % (ref 0.3–6.2)
ERYTHROCYTE [DISTWIDTH] IN BLOOD BY AUTOMATED COUNT: 14.2 % (ref 12.3–15.4)
GFR SERPL CREATININE-BSD FRML MDRD: 51 ML/MIN/1.73
GLUCOSE BLD-MCNC: 183 MG/DL (ref 65–99)
GLUCOSE BLDC GLUCOMTR-MCNC: 193 MG/DL (ref 70–130)
GLUCOSE BLDC GLUCOMTR-MCNC: 260 MG/DL (ref 70–130)
GLUCOSE BLDC GLUCOMTR-MCNC: 274 MG/DL (ref 70–130)
GLUCOSE BLDC GLUCOMTR-MCNC: 302 MG/DL (ref 70–130)
HCT VFR BLD AUTO: 45.3 % (ref 37.5–51)
HGB BLD-MCNC: 15 G/DL (ref 13–17.7)
IMM GRANULOCYTES # BLD AUTO: 0.01 10*3/MM3 (ref 0–0.05)
IMM GRANULOCYTES NFR BLD AUTO: 0.2 % (ref 0–0.5)
INR PPP: 2.71 (ref 0.9–1.1)
LYMPHOCYTES # BLD AUTO: 1.16 10*3/MM3 (ref 0.7–3.1)
LYMPHOCYTES NFR BLD AUTO: 24.5 % (ref 19.6–45.3)
MCH RBC QN AUTO: 28.2 PG (ref 26.6–33)
MCHC RBC AUTO-ENTMCNC: 33.1 G/DL (ref 31.5–35.7)
MCV RBC AUTO: 85.3 FL (ref 79–97)
MONOCYTES # BLD AUTO: 0.36 10*3/MM3 (ref 0.1–0.9)
MONOCYTES NFR BLD AUTO: 7.6 % (ref 5–12)
NEUTROPHILS # BLD AUTO: 3.05 10*3/MM3 (ref 1.7–7)
NEUTROPHILS NFR BLD AUTO: 64.6 % (ref 42.7–76)
NRBC BLD AUTO-RTO: 0 /100 WBC (ref 0–0.2)
PLATELET # BLD AUTO: 109 10*3/MM3 (ref 140–450)
PMV BLD AUTO: 11.8 FL (ref 6–12)
POTASSIUM BLD-SCNC: 4.6 MMOL/L (ref 3.5–5.2)
PROTHROMBIN TIME: 28.5 SECONDS (ref 11.7–14.2)
RBC # BLD AUTO: 5.31 10*6/MM3 (ref 4.14–5.8)
SODIUM BLD-SCNC: 139 MMOL/L (ref 136–145)
WBC NRBC COR # BLD: 4.73 10*3/MM3 (ref 3.4–10.8)

## 2020-01-20 PROCEDURE — 82962 GLUCOSE BLOOD TEST: CPT

## 2020-01-20 PROCEDURE — 25010000002 ENOXAPARIN PER 10 MG: Performed by: PSYCHIATRY & NEUROLOGY

## 2020-01-20 PROCEDURE — 80048 BASIC METABOLIC PNL TOTAL CA: CPT | Performed by: NURSE PRACTITIONER

## 2020-01-20 PROCEDURE — 85025 COMPLETE CBC W/AUTO DIFF WBC: CPT | Performed by: HOSPITALIST

## 2020-01-20 PROCEDURE — 85610 PROTHROMBIN TIME: CPT | Performed by: NURSE PRACTITIONER

## 2020-01-20 PROCEDURE — 63710000001 INSULIN LISPRO (HUMAN) PER 5 UNITS: Performed by: NURSE PRACTITIONER

## 2020-01-20 PROCEDURE — 97116 GAIT TRAINING THERAPY: CPT

## 2020-01-20 RX ORDER — WARFARIN SODIUM 7.5 MG/1
7.5 TABLET ORAL
Status: COMPLETED | OUTPATIENT
Start: 2020-01-20 | End: 2020-01-20

## 2020-01-20 RX ADMIN — DIGOXIN 125 MCG: 125 TABLET ORAL at 08:32

## 2020-01-20 RX ADMIN — ASPIRIN 325 MG: 325 TABLET ORAL at 08:32

## 2020-01-20 RX ADMIN — INSULIN LISPRO 4 UNITS: 100 INJECTION, SOLUTION INTRAVENOUS; SUBCUTANEOUS at 11:50

## 2020-01-20 RX ADMIN — INSULIN LISPRO 4 UNITS: 100 INJECTION, SOLUTION INTRAVENOUS; SUBCUTANEOUS at 18:29

## 2020-01-20 RX ADMIN — METOPROLOL SUCCINATE 25 MG: 25 TABLET, EXTENDED RELEASE ORAL at 08:32

## 2020-01-20 RX ADMIN — ENOXAPARIN SODIUM 80 MG: 80 INJECTION SUBCUTANEOUS at 18:28

## 2020-01-20 RX ADMIN — SODIUM CHLORIDE, PRESERVATIVE FREE 10 ML: 5 INJECTION INTRAVENOUS at 08:32

## 2020-01-20 RX ADMIN — GUAIFENESIN 600 MG: 600 TABLET, EXTENDED RELEASE ORAL at 08:32

## 2020-01-20 RX ADMIN — ENOXAPARIN SODIUM 80 MG: 80 INJECTION SUBCUTANEOUS at 05:52

## 2020-01-20 RX ADMIN — GUAIFENESIN 600 MG: 600 TABLET, EXTENDED RELEASE ORAL at 21:04

## 2020-01-20 RX ADMIN — ATORVASTATIN CALCIUM 40 MG: 20 TABLET, FILM COATED ORAL at 08:32

## 2020-01-20 RX ADMIN — SODIUM CHLORIDE, PRESERVATIVE FREE 10 ML: 5 INJECTION INTRAVENOUS at 21:04

## 2020-01-20 RX ADMIN — INSULIN LISPRO 5 UNITS: 100 INJECTION, SOLUTION INTRAVENOUS; SUBCUTANEOUS at 21:53

## 2020-01-20 RX ADMIN — INSULIN LISPRO 2 UNITS: 100 INJECTION, SOLUTION INTRAVENOUS; SUBCUTANEOUS at 08:32

## 2020-01-20 RX ADMIN — Medication 400 MG: at 08:32

## 2020-01-20 RX ADMIN — WARFARIN SODIUM 7.5 MG: 7.5 TABLET ORAL at 18:29

## 2020-01-20 RX ADMIN — SODIUM BICARBONATE 1950 MG: 650 TABLET ORAL at 08:33

## 2020-01-20 RX ADMIN — SODIUM BICARBONATE 1950 MG: 650 TABLET ORAL at 21:03

## 2020-01-20 NOTE — THERAPY TREATMENT NOTE
Patient Name: Francisco Sequeira  : 1935    MRN: [id number]                              Today's Date: 2020       Admit Date: 2020    Visit Dx:     ICD-10-CM ICD-9-CM   1. Stroke-like symptom R29.90 781.99   2. H/O mechanical aortic valve replacement Z95.2 V43.3   3. Current use of long term anticoagulation Z79.01 V58.61   4. Subtherapeutic international normalized ratio (INR) R79.1 790.92   5. Acute kidney injury (CMS/HCC) N17.9 584.9   6. Acute cerebral infarction (CMS/HCC) I63.9 434.91     Patient Active Problem List   Diagnosis   • Atrial fibrillation (CMS/HCC)   • Hypertension   • Atopic rhinitis   • Uncontrolled type 2 diabetes mellitus (CMS/HCC)   • Gastroesophageal reflux disease   • Hyperlipidemia   • Renal insufficiency   • Low testosterone   • Special screening for malignant neoplasm of prostate   • Hx of aortic valve replacement, mechanical [Z95.2]   • Screening for iron deficiency anemia   • Stroke-like symptom   • Kidney carcinoma (CMS/HCC)   • CKD (chronic kidney disease) stage 3, GFR 30-59 ml/min (CMS/HCC)   • BYRON (acute kidney injury) (CMS/HCC)   • Acute cerebral infarction (CMS/HCC)     Past Medical History:   Diagnosis Date   • Abnormal electrocardiogram    • Allergic rhinitis    • Aortic valve insufficiency    • Ascending aortic aneurysm (CMS/HCC)    • Atrial fibrillation (CMS/HCC)    • Bacteremia    • Calcific aortic stenosis of bicuspid valve    • Cardiac arrest (CMS/HCC)    • Cardiomyopathy (CMS/HCC)    • Contact dermatitis due to poison ivy    • Diabetes mellitus (CMS/HCC)    • Elbow fracture    • Esophageal reflux    • GERD (gastroesophageal reflux disease)    • Head injury    • Hyperglycemia    • Hyperlipidemia    • Hypertension    • Kidney carcinoma (CMS/HCC)    • Leg swelling    • Localized swelling of both lower legs    • Nasal congestion    • Nasal drainage    • Nonischemic cardiomyopathy (CMS/HCC)    • Palpitations    • Pedal edema    • Pleural effusion on left    •  Renal insufficiency syndrome    • Renal oncocytoma    • Seasonal allergic reaction    • Sinusitis    • Syncope    • Type 2 diabetes mellitus (CMS/HCC)     uncontrolled   • Vision changes    • Visual field defect      Past Surgical History:   Procedure Laterality Date   • AORTIC VALVE REPAIR/REPLACEMENT     • ASCENDING AORTIC ANEURYSM REPAIR W/ MECHANICAL AORTIC VALVE REPLACEMENT     • NEPHRECTOMY     • OTHER SURGICAL HISTORY      elbow surgery   • PROSTATE SURGERY     • THORACENTESIS Left     diagnostic     General Information     Row Name 01/20/20 1640          PT Evaluation Time/Intention    Document Type  therapy note (daily note)  -MW     Mode of Treatment  physical therapy  -MW     Row Name 01/20/20 1640          General Information    Existing Precautions/Restrictions  fall L AFO   -MW     Row Name 01/20/20 1640          Cognitive Assessment/Intervention- PT/OT    Orientation Status (Cognition)  oriented x 4  -MW     Row Name 01/20/20 1640          Safety Issues, Functional Mobility    Impairments Affecting Function (Mobility)  balance;coordination;endurance/activity tolerance;strength  -MW     Comment, Safety Issues/Impairments (Mobility)  Gait belt and AFO donned for safety.  -MW       User Key  (r) = Recorded By, (t) = Taken By, (c) = Cosigned By    Initials Name Provider Type    MW Tierra Latham, PT Physical Therapist        Mobility     Row Name 01/20/20 1641          Bed Mobility Assessment/Treatment    Comment (Bed Mobility)  UIC  -     Row Name 01/20/20 1641          Transfer Assessment/Treatment    Comment (Transfers)  Pt was up ambulating with spouse at start of PT session  -     Row Name 01/20/20 1641          Gait/Stairs Assessment/Training    Gait/Stairs Assessment/Training  gait/ambulation independence  -MW     [place] Level (Gait)  conditional independence;supervision  -MW     Assistive Device (Gait)  walker, front-wheeled  -MW     Distance in Feet (Gait)  150  -MW     Pattern (Gait)   step-through  -MW     Deviations/Abnormal Patterns (Gait)  gait speed decreased  -MW     Left Sided Gait Deviations  foot drop/toe drag;knee hyperextension  -MW     Negotiation (Stairs)  stairs independence  -MW     [place] Level (Stairs)  contact guard  -MW     Handrail Location (Stairs)  both sides  -MW     Number of Steps (Stairs)  2  -MW     Ascending Technique (Stairs)  step-to-step  -MW     Descending Technique (Stairs)  step-to-step  -MW     Comment (Gait/Stairs)  No LOB with gait or stairs, increased L knee hyperextension & decreased L step length when fatigued. Used (B) rails due to having non consecutive steps at home and will be able to use RW.   -MW       User Key  (r) = Recorded By, (t) = Taken By, (c) = Cosigned By    Initials Name Provider Type    Tierra Sandhu, CICI Physical Therapist        Obj/Interventions     Row Name 01/20/20 4636          Therapeutic Exercise    Comment (Therapeutic Exercise)  Therex LLE reclined: quad sets 10 x 5 sec hold, SLR x 10  -MW       User Key  (r) = Recorded By, (t) = Taken By, (c) = Cosigned By    Initials Name Provider Type    Tierra Sandhu, CICI Physical Therapist        Goals/Plan    No documentation.       Clinical Impression     Row Name 01/20/20 6617          Pain Assessment    Additional Documentation  Pain Scale: Numbers Pre/Post-Treatment (Group)  -     Row Name 01/20/20 2834          Pain Scale: Numbers Pre/Post-Treatment    Pain Scale: Numbers, Pretreatment  0/10 - no pain  -MW     Pain Scale: Numbers, Post-Treatment  0/10 - no pain  -     Row Name 01/20/20 6797          Plan of Care Review    Plan of Care Reviewed With  patient;spouse  -     Progress  improving  -MW     Outcome Summary  Patient ambulating in hallway with spouse prior to PT session. Today he was able to negotiate x 2 steps with CGA safely. He was able to ambulate x 150 ft with CI/Sup A with L knee hyperextension worse as he fatigued, otherwise safe with no LOB. He  anticipates home tomorrow dependent on INR.  -     Row Name 01/20/20 1645          Physical Therapy Clinical Impression    Patient/Family Goals Statement (PT Clinical Impression)  Agreeable to PT and stair training.  -     Row Name 01/20/20 1645          Vital Signs    Pre Systolic BP Rehab  129  -MW     Pre Treatment Diastolic BP  76  -MW     Posttreatment Heart Rate (beats/min)  78  -MW     O2 Delivery Pre Treatment  room air  -MW     O2 Delivery Intra Treatment  room air  -MW     O2 Delivery Post Treatment  room air  -MW     Pre Patient Position  Standing  -MW     Intra Patient Position  Standing  -MW     Post Patient Position  Sitting  -MW     Row Name 01/20/20 1645          Positioning and Restraints    Pre-Treatment Position  other (comment) ambulating in hallway with spouse  -MW     Post Treatment Position  chair  -MW     In Chair  reclined;sitting;call light within reach;encouraged to call for assist;with family/caregiver  -MW       User Key  (r) = Recorded By, (t) = Taken By, (c) = Cosigned By    Initials Name Provider Type    MW Tierra Latham, PT Physical Therapist        Outcome Measures     Row Name 01/20/20 1649          How much help from another person do you currently need...    Turning from your back to your side while in flat bed without using bedrails?  4  -MW     Moving from lying on back to sitting on the side of a flat bed without bedrails?  4  -MW     Standing up from a chair using your arms (e.g., wheelchair, bedside chair)?  4  -MW     Climbing 3-5 steps with a railing?  3  -MW     To walk in hospital room?  4  -MW     Row Name 01/20/20 1649          Modified [place] Scale    Modified [place] Scale  3 - Moderate disability.  Requiring some help, but able to walk without assistance.  -[facility] Name 01/20/20 1649          Functional Assessment    Outcome Measure Options  AM-PAC 6 Clicks Basic Mobility (PT);Modified [place]  -MW       User Key  (r) = Recorded By, (t) = Taken By, (c) =  Cosigned By    Initials Name Provider Type    Tierra Sandhu PT Physical Therapist          PT Recommendation and Plan  Planned Therapy Interventions (PT Eval): balance training, bed mobility training, gait training, home exercise program, neuromuscular re-education, patient/family education, stair training, strengthening, transfer training  Outcome Summary/Treatment Plan (PT)  Anticipated Equipment Needs at Discharge (PT): front wheeled walker  Anticipated Discharge Disposition (PT): home with home health, home with assist, home with OP services  Plan of Care Reviewed With: patient, spouse  Progress: improving  Outcome Summary: Patient ambulating in hallway with spouse prior to PT session. Today he was able to negotiate x 2 steps with CGA safely. He was able to ambulate x 150 ft with CI/Sup A with L knee hyperextension worse as he fatigued, otherwise safe with no LOB. He anticipates home tomorrow dependent on INR.     Time Calculation:   PT Charges     Row Name 01/20/20 1650             Time Calculation    Start Time  1623  -MW      Stop Time  1639  -MW      Time Calculation (min)  16 min  -MW      PT Received On  01/20/20  -MW      PT - Next Appointment  01/22/20  -MW         Time Calculation- PT    Total Timed Code Minutes- PT  13 minute(s)  -MW        User Key  (r) = Recorded By, (t) = Taken By, (c) = Cosigned By    Initials Name Provider Type    Tierra Sandhu PT Physical Therapist        Therapy Charges for Today     Code Description Service Date Service Provider Modifiers Qty    40913418946 HC GAIT TRAINING EA 15 MIN 1/20/2020 Tierra Latham PT GP 1          PT G-Codes  Outcome Measure Options: AM-PAC 6 Clicks Basic Mobility (PT), Modified [place]  AM-PAC 6 Clicks Score (PT): 23  AM-PAC 6 Clicks Score (OT): 18  Modified [place] Scale: 3 - Moderate disability.  Requiring some help, but able to walk without assistance.    Tierra Latham PT  1/20/2020

## 2020-01-20 NOTE — PROGRESS NOTES
"DAILY PROGRESS NOTE  [facility]    Patient Identification:  Name: Francisco Sequeira  Age: 84 y.o.  Sex: male  :  1935  MRN: [id number]         Primary Care Physician: Rubin Hinkle APRN    Subjective:  Interval History:He feels better. Walking with PT.    Objective:    Scheduled Meds:    aspirin 325 mg Oral Daily   Or      aspirin 300 mg Rectal Daily   atorvastatin 40 mg Oral Daily   digoxin 125 mcg Oral Daily   enoxaparin 1 mg/kg Subcutaneous Q12H   guaiFENesin 600 mg Oral Q12H   insulin lispro 0-7 Units Subcutaneous 4x Daily With Meals & Nightly   magnesium oxide 400 mg Oral Daily   metoprolol succinate XL 25 mg Oral Daily   sodium bicarbonate 1,950 mg Oral BID   sodium chloride 10 mL Intravenous Q12H   warfarin 7.5 mg Oral Once     Continuous Infusions:    Pharmacy to dose warfarin        Vital signs in last 24 hours:  Temp:  [97.4 °F (36.3 °C)-98 °F (36.7 °C)] (P) 97.4 °F (36.3 °C)  Heart Rate:  [61-86] 81  Resp:  [17-18] 17  BP: (127-164)/(66-88) 127/66    Intake/Output:    Intake/Output Summary (Last 24 hours) at 2020 1335  Last data filed at 2020 0600  Gross per 24 hour   Intake 840 ml   Output 600 ml   Net 240 ml       Exam:  /66 (BP Location: Right arm, Patient Position: Sitting)   Pulse 81   Temp (P) 97.4 °F (36.3 °C) (Oral)   Resp 17   Ht 182 cm (71.65\")   Wt 93.9 kg (207 lb 1.6 oz)   SpO2 97%   BMI 28.36 kg/m²     General Appearance:    Alert, cooperative, no distress   Head:    Normocephalic, without obvious abnormality, atraumatic   Eyes:       Throat:   Lips, tongue, gums normal   Neck:   Supple, symmetrical, trachea midline, no JVD   Lungs:     Clear to auscultation bilaterally, respirations unlabored   Chest Wall:    No tenderness or deformity    Heart:    Regular rate and rhythm, S1 and S2 normal, no murmur,no  Rub or gallop   Abdomen:     Soft, non-tender, bowel sounds active, no masses, no organomegaly    Extremities:   Extremities normal, " atraumatic, no cyanosis or edema   Pulses:      Skin:   Skin is warm and dry,  no rashes or palpable lesions   Neurologic:   no focal deficits noted      Lab Results (last 72 hours)     Procedure Component Value Units Date/Time    POC Glucose Once [[id number]]  (Abnormal) Collected:  01/17/20 1056    Specimen:  Blood Updated:  01/17/20 1057     Glucose 233 mg/dL     POC Glucose Once [[id number]]  (Abnormal) Collected:  01/17/20 0621    Specimen:  Blood Updated:  01/17/20 0623     Glucose 187 mg/dL     Basic Metabolic Panel [[id number]]  (Abnormal) Collected:  01/17/20 0539    Specimen:  Blood Updated:  01/17/20 0618     Glucose 210 mg/dL      BUN 33 mg/dL      Creatinine 1.42 mg/dL      Sodium 140 mmol/L      Potassium 4.7 mmol/L      Chloride 104 mmol/L      CO2 23.9 mmol/L      Calcium 9.1 mg/dL      eGFR Non African Amer 48 mL/min/1.73      BUN/Creatinine Ratio 23.2     Anion Gap 12.1 mmol/L     Narrative:       GFR Normal >60  Chronic Kidney Disease <60  Kidney Failure <15      Protime-INR [[id number]]  (Abnormal) Collected:  01/17/20 0539    Specimen:  Blood Updated:  01/17/20 0604     Protime 17.8 Seconds      INR 1.50    CBC & Differential [[id number]] Collected:  01/17/20 0539    Specimen:  Blood Updated:  01/17/20 0554    Narrative:       The following orders were created for panel order CBC & Differential.  Procedure                               Abnormality         Status                     ---------                               -----------         ------                     CBC Auto Differential[[id number]]        Abnormal            Final result                 Please view results for these tests on the individual orders.    CBC Auto Differential [[id number]]  (Abnormal) Collected:  01/17/20 0539    Specimen:  Blood Updated:  01/17/20 0554     WBC 6.01 10*3/mm3      RBC 5.36 10*6/mm3      Hemoglobin 15.4 g/dL      Hematocrit 47.0 %      MCV 87.7 fL      MCH 28.7 pg      MCHC 32.8 g/dL      RDW 13.9 %       RDW-SD 44.3 fl      MPV 12.2 fL      Platelets 109 10*3/mm3      Neutrophil % 75.4 %      Lymphocyte % 15.3 %      Monocyte % 7.0 %      Eosinophil % 1.5 %      Basophil % 0.5 %      Immature Grans % 0.3 %      Neutrophils, Absolute 4.53 10*3/mm3      Lymphocytes, Absolute 0.92 10*3/mm3      Monocytes, Absolute 0.42 10*3/mm3      Eosinophils, Absolute 0.09 10*3/mm3      Basophils, Absolute 0.03 10*3/mm3      Immature Grans, Absolute 0.02 10*3/mm3      nRBC 0.0 /100 WBC     POC Glucose Once [[id number]]  (Abnormal) Collected:  01/16/20 2136    Specimen:  Blood Updated:  01/16/20 2137     Glucose 244 mg/dL     POC Glucose Once [[id number]]  (Abnormal) Collected:  01/16/20 1708    Specimen:  Blood Updated:  01/16/20 1712     Glucose 285 mg/dL     Basic Metabolic Panel [[id number]]  (Abnormal) Collected:  01/16/20 0559    Specimen:  Blood Updated:  01/16/20 0651     Glucose 167 mg/dL      BUN 30 mg/dL      Creatinine 1.46 mg/dL      Sodium 138 mmol/L      Potassium 4.8 mmol/L      Chloride 104 mmol/L      CO2 21.0 mmol/L      Calcium 9.4 mg/dL      eGFR Non African Amer 46 mL/min/1.73      BUN/Creatinine Ratio 20.5     Anion Gap 13.0 mmol/L     Narrative:       GFR Normal >60  Chronic Kidney Disease <60  Kidney Failure <15      POC Glucose Once [[id number]]  (Abnormal) Collected:  01/16/20 0643    Specimen:  Blood Updated:  01/16/20 0644     Glucose 157 mg/dL     Protime-INR [[id number]]  (Abnormal) Collected:  01/16/20 0559    Specimen:  Blood Updated:  01/16/20 0640     Protime 18.6 Seconds      INR 1.59    POC Glucose Once [[id number]]  (Abnormal) Collected:  01/15/20 2134    Specimen:  Blood Updated:  01/15/20 2136     Glucose 238 mg/dL     POC Glucose Once [[id number]]  (Abnormal) Collected:  01/15/20 1655    Specimen:  Blood Updated:  01/15/20 1709     Glucose 200 mg/dL     POC Glucose Once [[id number]]  (Abnormal) Collected:  01/15/20 1123    Specimen:  Blood Updated:  01/15/20 1133     Glucose 222 mg/dL     Vitamin B12  [[id number]]  (Normal) Collected:  01/13/20 0747    Specimen:  Blood Updated:  01/15/20 1115     Vitamin B-12 381 pg/mL     Narrative:       Results may be falsely increased if patient taking Biotin.      Folate [[id number]]  (Normal) Collected:  01/13/20 0747    Specimen:  Blood Updated:  01/15/20 1115     Folate 9.32 ng/mL     Narrative:       Results may be falsely increased if patient taking Biotin.      TSH [[id number]]  (Normal) Collected:  01/15/20 0646    Specimen:  Blood from Hand, Left Updated:  01/15/20 1106     TSH 3.250 uIU/mL     Basic Metabolic Panel [[id number]]  (Abnormal) Collected:  01/15/20 0646    Specimen:  Blood from Hand, Left Updated:  01/15/20 0726     Glucose 131 mg/dL      BUN 25 mg/dL      Creatinine 1.42 mg/dL      Sodium 137 mmol/L      Potassium 4.8 mmol/L      Chloride 104 mmol/L      CO2 22.2 mmol/L      Calcium 9.0 mg/dL      eGFR Non African Amer 48 mL/min/1.73      BUN/Creatinine Ratio 17.6     Anion Gap 10.8 mmol/L     Narrative:       GFR Normal >60  Chronic Kidney Disease <60  Kidney Failure <15      Protime-INR [[id number]]  (Abnormal) Collected:  01/15/20 0646    Specimen:  Blood from Hand, Left Updated:  01/15/20 0719     Protime 20.6 Seconds      INR 1.80    CBC & Differential [[id number]] Collected:  01/15/20 0646    Specimen:  Blood from Hand, Left Updated:  01/15/20 0703    Narrative:       The following orders were created for panel order CBC & Differential.  Procedure                               Abnormality         Status                     ---------                               -----------         ------                     CBC Auto Differential[[id number]]        Abnormal            Final result                 Please view results for these tests on the individual orders.    CBC Auto Differential [[id number]]  (Abnormal) Collected:  01/15/20 0646    Specimen:  Blood from Hand, Left Updated:  01/15/20 0703     WBC 5.54 10*3/mm3      RBC 5.35 10*6/mm3      Hemoglobin 15.1  g/dL      Hematocrit 46.0 %      MCV 86.0 fL      MCH 28.2 pg      MCHC 32.8 g/dL      RDW 13.7 %      RDW-SD 42.8 fl      MPV 12.1 fL      Platelets 101 10*3/mm3      Neutrophil % 69.1 %      Lymphocyte % 20.9 %      Monocyte % 6.9 %      Eosinophil % 2.3 %      Basophil % 0.4 %      Immature Grans % 0.4 %      Neutrophils, Absolute 3.83 10*3/mm3      Lymphocytes, Absolute 1.16 10*3/mm3      Monocytes, Absolute 0.38 10*3/mm3      Eosinophils, Absolute 0.13 10*3/mm3      Basophils, Absolute 0.02 10*3/mm3      Immature Grans, Absolute 0.02 10*3/mm3      nRBC 0.0 /100 WBC     POC Glucose Once [[id number]]  (Normal) Collected:  01/15/20 0602    Specimen:  Blood Updated:  01/15/20 0606     Glucose 120 mg/dL     POC Glucose Once [[id number]]  (Abnormal) Collected:  01/14/20 2022    Specimen:  Blood Updated:  01/14/20 2026     Glucose 246 mg/dL     C-reactive Protein [[id number]]  (Normal) Collected:  01/14/20 1820    Specimen:  Blood Updated:  01/14/20 1853     C-Reactive Protein 0.30 mg/dL     POC Glucose Once [[id number]]  (Abnormal) Collected:  01/14/20 1717    Specimen:  Blood Updated:  01/14/20 1724     Glucose 153 mg/dL         Data Review:  Results from last 7 days   Lab Units 01/20/20  0540 01/19/20  0838 01/18/20  0607   SODIUM mmol/L 139 137 138   POTASSIUM mmol/L 4.6 4.7 4.7   CHLORIDE mmol/L 103 100 103   CO2 mmol/L 23.5 24.5 23.0   BUN mg/dL 29* 33* 32*   CREATININE mg/dL 1.34* 1.35* 1.34*   GLUCOSE mg/dL 183* 302* 203*   CALCIUM mg/dL 9.3 9.2 9.3     Results from last 7 days   Lab Units 01/20/20  0540 01/19/20  0621 01/18/20  0607   WBC 10*3/mm3 4.73 4.81 5.52   HEMOGLOBIN g/dL 15.0 15.0 15.0   HEMATOCRIT % 45.3 45.0 45.8   PLATELETS 10*3/mm3 109* 129* 106*     Results from last 7 days   Lab Units 01/15/20  0646   TSH uIU/mL 3.250     Results from last 7 days   Lab Units 01/14/20  0516   HEMOGLOBIN A1C % 7.40*     Lab Results   Lab Value Date/Time    TROPONINT <0.010 01/13/2020 0747    TROPONINT <0.010  03/30/2017 0809     Results from last 7 days   Lab Units 01/14/20  0516   CHOLESTEROL mg/dL 154   TRIGLYCERIDES mg/dL 161*   HDL CHOL mg/dL 34*   LDL CHOL mg/dL 88           Invalid input(s): PROT, LABALBU  Results from last 7 days   Lab Units 01/15/20  0646   TSH uIU/mL 3.250     Results from last 7 days   Lab Units 01/14/20  0516   HEMOGLOBIN A1C % 7.40*     Glucose   Date/Time Value Ref Range Status   01/20/2020 1103 260 (H) 70 - 130 mg/dL Final   01/20/2020 0617 193 (H) 70 - 130 mg/dL Final   01/19/2020 2118 354 (H) 70 - 130 mg/dL Final   01/19/2020 1658 262 (H) 70 - 130 mg/dL Final   01/19/2020 1114 254 (H) 70 - 130 mg/dL Final   01/19/2020 0623 177 (H) 70 - 130 mg/dL Final   01/18/2020 2107 305 (H) 70 - 130 mg/dL Final   01/18/2020 1655 262 (H) 70 - 130 mg/dL Final     Results from last 7 days   Lab Units 01/20/20  0540 01/19/20  0621 01/18/20  0607   INR  2.71* 2.34* 1.94*       Past Medical History:   Diagnosis Date   • Abnormal electrocardiogram    • Allergic rhinitis    • Aortic valve insufficiency    • Ascending aortic aneurysm (CMS/HCC)    • Atrial fibrillation (CMS/HCC)    • Bacteremia    • Calcific aortic stenosis of bicuspid valve    • Cardiac arrest (CMS/HCC)    • Cardiomyopathy (CMS/HCC)    • Contact dermatitis due to poison ivy    • Diabetes mellitus (CMS/HCC)    • Elbow fracture    • Esophageal reflux    • GERD (gastroesophageal reflux disease)    • Head injury    • Hyperglycemia    • Hyperlipidemia    • Hypertension    • Kidney carcinoma (CMS/HCC)    • Leg swelling    • Localized swelling of both lower legs    • Nasal congestion    • Nasal drainage    • Nonischemic cardiomyopathy (CMS/HCC)    • Palpitations    • Pedal edema    • Pleural effusion on left    • Renal insufficiency syndrome    • Renal oncocytoma    • Seasonal allergic reaction    • Sinusitis    • Syncope    • Type 2 diabetes mellitus (CMS/HCC)     uncontrolled   • Vision changes    • Visual field defect        Assessment:  Active  Hospital Problems    Diagnosis  POA   • BYRON (acute kidney injury) (CMS/HCC) [N17.9]  Yes   • Acute cerebral infarction (CMS/HCC) [I63.9]  Yes   • Stroke-like symptom [R29.90]  Yes   • Kidney carcinoma (CMS/HCC) [C64.9]  Yes   • CKD (chronic kidney disease) stage 3, GFR 30-59 ml/min (CMS/HCC) [N18.3]  Yes   • Hx of aortic valve replacement, mechanical [Z95.2] [Z95.2]  Not Applicable   • Uncontrolled type 2 diabetes mellitus (CMS/HCC) [E11.65]  Yes   • Hyperlipidemia [E78.5]  Yes   • Atrial fibrillation (CMS/HCC) [I48.91]  Yes   • Hypertension [I10]  Yes      Resolved Hospital Problems   No resolved problems to display.       Plan:  Continue anticoagulation. As per cardiology and neurology.. Follow lab.  Coumadin per pharmacy.  Cardiology wants INR 3 or greater before DC.    Leobardo Juarez MD  1/20/2020  1:35 PM

## 2020-01-20 NOTE — PLAN OF CARE
Pt. VS WNL.  No c/o pain.  Pt. NIH-0.  Pt. A&O x4.  Pt. Working with PT ambulating well, ambulating around unit often with assist of 1.  Awaiting INR to be therapeutic, then can DC home  Problem: Patient Care Overview  Goal: Plan of Care Review  Outcome: Ongoing (interventions implemented as appropriate)  Flowsheets (Taken 1/19/2020 1916)  Progress: improving  Plan of Care Reviewed With: patient   .  Pt. Resting comfortably at present, will continue to monitor closely.

## 2020-01-20 NOTE — PLAN OF CARE
Problem: Patient Care Overview  Goal: Plan of Care Review  Outcome: Ongoing (interventions implemented as appropriate)  Flowsheets (Taken 1/20/2020 6552)  Progress: improving  Plan of Care Reviewed With: patient  Outcome Summary: Patient rested quietly over night. waiting on this am labs to see if INR is >3.0, if so patint will D/C today.

## 2020-01-20 NOTE — PROGRESS NOTES
Pharmacy Consult: Warfarin Dosing/ Monitoring    Francisco Sequeira is a 84 y.o. male, estimated creatinine clearance is 43.7 mL/min (A) (by C-G formula based on SCr of 1.42 mg/dL (H)). weighing 77.1 kg (169 lb 14.4 oz).      Results from last 7 days   Lab Units 01/19/20  0621 01/18/20  0607 01/17/20  0539 01/16/20  0559 01/15/20  0646 01/14/20  0516 01/13/20  0747   INR  2.34* 1.94* 1.50* 1.59* 1.80* 1.99* 2.21*   HEMOGLOBIN g/dL 15.0 15.0 15.4  --  15.1 15.6 14.8   HEMATOCRIT % 45.0 45.8 47.0  --  46.0 47.5 45.2   PLATELETS 10*3/mm3 129* 106* 109*  --  101* 98* 109*     Results from last 7 days   Lab Units 01/18/20  0607 01/17/20  0539 01/16/20  0559  01/13/20  0747   SODIUM mmol/L 138 140 138   < > 143   POTASSIUM mmol/L 4.7 4.7 4.8   < > 4.4   CHLORIDE mmol/L 103 104 104   < > 107   CO2 mmol/L 23.0 23.9 21.0*   < > 25.2   BUN mg/dL 32* 33* 30*   < > 29*   CREATININE mg/dL 1.34* 1.42* 1.46*   < > 2.04*   CALCIUM mg/dL 9.3 9.1 9.4   < > 8.9   BILIRUBIN mg/dL  --   --   --   --  0.5   ALK PHOS U/L  --   --   --   --  80   ALT (SGPT) U/L  --   --   --   --  20   AST (SGOT) U/L  --   --   --   --  18   GLUCOSE mg/dL 203* 210* 167*   < > 68    < > = values in this interval not displayed.     Anticoagulation history: Per last note from [facility] on 12/30, patient's home warfarin regimen is 5 mg qMWF and 7.5 mg TThSSun.     Hospital Anticoagulation:  Consulting provider: LIZA Zayas  Start date: 1/13/20  Indication: Aortic mechanical valve  Target INR: 2.5-3.5  Expected duration: Lifelong  Bridge Therapy: Yes              and enoxapain  Date 1/13 1/14 1/15 1/16 1/17 1/18 1/19 1/20     INR 2.21 1.99 1.80 1.59 1.5 1.94 2.34 2.71     Warfarin dose  7.5mg 7.5mg 7.5mg 10mg 10 mg 7.5mg 7.5 mg       Potential drug interactions:     Relevant nutrition status: reg cardiac diet    Other: looks like dose on 1/13 was never given    Education complete?/ Date: no    Assessment/Plan:  INR increasing towards goal,  continue warfarin 7.5 mg nightly.    Abner Rodriges, PharmD, BCIDP, BCPS

## 2020-01-20 NOTE — PLAN OF CARE
Problem: Patient Care Overview  Goal: Plan of Care Review  Outcome: Ongoing (interventions implemented as appropriate)  Flowsheets (Taken 1/20/2020 9529)  Progress: improving  Plan of Care Reviewed With: patient;spouse  Outcome Summary: Patient ambulating in hallway with spouse prior to PT session. Today he was able to negotiate x 2 steps with CGA safely. He was able to ambulate x 150 ft with CI/Sup A with L knee hyperextension worse as he fatigued, otherwise safe with no LOB. He anticipates home tomorrow dependent on INR.

## 2020-01-21 VITALS
RESPIRATION RATE: 18 BRPM | BODY MASS INDEX: 28.05 KG/M2 | HEIGHT: 72 IN | SYSTOLIC BLOOD PRESSURE: 120 MMHG | HEART RATE: 78 BPM | OXYGEN SATURATION: 95 % | DIASTOLIC BLOOD PRESSURE: 69 MMHG | TEMPERATURE: 97.9 F | WEIGHT: 207.1 LBS

## 2020-01-21 LAB
ANION GAP SERPL CALCULATED.3IONS-SCNC: 11 MMOL/L (ref 5–15)
BASOPHILS # BLD AUTO: 0.05 10*3/MM3 (ref 0–0.2)
BASOPHILS NFR BLD AUTO: 1 % (ref 0–1.5)
BUN BLD-MCNC: 33 MG/DL (ref 8–23)
BUN/CREAT SERPL: 23.9 (ref 7–25)
CALCIUM SPEC-SCNC: 9.4 MG/DL (ref 8.6–10.5)
CHLORIDE SERPL-SCNC: 102 MMOL/L (ref 98–107)
CO2 SERPL-SCNC: 24 MMOL/L (ref 22–29)
CREAT BLD-MCNC: 1.38 MG/DL (ref 0.76–1.27)
DEPRECATED RDW RBC AUTO: 44.2 FL (ref 37–54)
EOSINOPHIL # BLD AUTO: 0.11 10*3/MM3 (ref 0–0.4)
EOSINOPHIL NFR BLD AUTO: 2.2 % (ref 0.3–6.2)
ERYTHROCYTE [DISTWIDTH] IN BLOOD BY AUTOMATED COUNT: 14 % (ref 12.3–15.4)
GFR SERPL CREATININE-BSD FRML MDRD: 49 ML/MIN/1.73
GLUCOSE BLD-MCNC: 171 MG/DL (ref 65–99)
GLUCOSE BLDC GLUCOMTR-MCNC: 223 MG/DL (ref 70–130)
GLUCOSE BLDC GLUCOMTR-MCNC: 310 MG/DL (ref 70–130)
HCT VFR BLD AUTO: 44.9 % (ref 37.5–51)
HGB BLD-MCNC: 15.2 G/DL (ref 13–17.7)
IMM GRANULOCYTES # BLD AUTO: 0.02 10*3/MM3 (ref 0–0.05)
IMM GRANULOCYTES NFR BLD AUTO: 0.4 % (ref 0–0.5)
INR PPP: 3.03 (ref 0.9–1.1)
LYMPHOCYTES # BLD AUTO: 1.09 10*3/MM3 (ref 0.7–3.1)
LYMPHOCYTES NFR BLD AUTO: 22.1 % (ref 19.6–45.3)
MCH RBC QN AUTO: 29.5 PG (ref 26.6–33)
MCHC RBC AUTO-ENTMCNC: 33.9 G/DL (ref 31.5–35.7)
MCV RBC AUTO: 87.2 FL (ref 79–97)
MONOCYTES # BLD AUTO: 0.44 10*3/MM3 (ref 0.1–0.9)
MONOCYTES NFR BLD AUTO: 8.9 % (ref 5–12)
NEUTROPHILS # BLD AUTO: 3.22 10*3/MM3 (ref 1.7–7)
NEUTROPHILS NFR BLD AUTO: 65.4 % (ref 42.7–76)
NRBC BLD AUTO-RTO: 0 /100 WBC (ref 0–0.2)
PLATELET # BLD AUTO: 108 10*3/MM3 (ref 140–450)
PMV BLD AUTO: 12.3 FL (ref 6–12)
POTASSIUM BLD-SCNC: 4.4 MMOL/L (ref 3.5–5.2)
PROTHROMBIN TIME: 31.1 SECONDS (ref 11.7–14.2)
RBC # BLD AUTO: 5.15 10*6/MM3 (ref 4.14–5.8)
SODIUM BLD-SCNC: 137 MMOL/L (ref 136–145)
WBC NRBC COR # BLD: 4.93 10*3/MM3 (ref 3.4–10.8)

## 2020-01-21 PROCEDURE — 25010000002 ENOXAPARIN PER 10 MG: Performed by: PSYCHIATRY & NEUROLOGY

## 2020-01-21 PROCEDURE — 63710000001 INSULIN LISPRO (HUMAN) PER 5 UNITS: Performed by: NURSE PRACTITIONER

## 2020-01-21 PROCEDURE — 36415 COLL VENOUS BLD VENIPUNCTURE: CPT | Performed by: NURSE PRACTITIONER

## 2020-01-21 PROCEDURE — 85025 COMPLETE CBC W/AUTO DIFF WBC: CPT | Performed by: HOSPITALIST

## 2020-01-21 PROCEDURE — 82962 GLUCOSE BLOOD TEST: CPT

## 2020-01-21 PROCEDURE — 85610 PROTHROMBIN TIME: CPT | Performed by: NURSE PRACTITIONER

## 2020-01-21 PROCEDURE — 80048 BASIC METABOLIC PNL TOTAL CA: CPT | Performed by: NURSE PRACTITIONER

## 2020-01-21 RX ORDER — ASPIRIN 325 MG
325 TABLET ORAL DAILY
Start: 2020-01-22 | End: 2020-04-10

## 2020-01-21 RX ORDER — WARFARIN SODIUM 7.5 MG/1
TABLET ORAL
Qty: 30 TABLET | Refills: 0 | Status: SHIPPED | OUTPATIENT
Start: 2020-01-21 | End: 2020-02-07 | Stop reason: SDUPTHER

## 2020-01-21 RX ORDER — ATORVASTATIN CALCIUM 40 MG/1
40 TABLET, FILM COATED ORAL DAILY
Qty: 30 TABLET | Refills: 0 | Status: SHIPPED | OUTPATIENT
Start: 2020-01-22 | End: 2020-02-14 | Stop reason: SDUPTHER

## 2020-01-21 RX ORDER — SODIUM BICARBONATE 650 MG/1
1950 TABLET ORAL 2 TIMES DAILY
Qty: 180 TABLET | Refills: 0 | Status: SHIPPED | OUTPATIENT
Start: 2020-01-21 | End: 2020-02-20

## 2020-01-21 RX ADMIN — INSULIN LISPRO 4 UNITS: 100 INJECTION, SOLUTION INTRAVENOUS; SUBCUTANEOUS at 08:19

## 2020-01-21 RX ADMIN — GUAIFENESIN 600 MG: 600 TABLET, EXTENDED RELEASE ORAL at 08:19

## 2020-01-21 RX ADMIN — METOPROLOL SUCCINATE 25 MG: 25 TABLET, EXTENDED RELEASE ORAL at 08:19

## 2020-01-21 RX ADMIN — Medication 400 MG: at 08:19

## 2020-01-21 RX ADMIN — ATORVASTATIN CALCIUM 40 MG: 20 TABLET, FILM COATED ORAL at 08:19

## 2020-01-21 RX ADMIN — SODIUM CHLORIDE, PRESERVATIVE FREE 10 ML: 5 INJECTION INTRAVENOUS at 08:19

## 2020-01-21 RX ADMIN — SODIUM BICARBONATE 1950 MG: 650 TABLET ORAL at 08:18

## 2020-01-21 RX ADMIN — ENOXAPARIN SODIUM 80 MG: 80 INJECTION SUBCUTANEOUS at 06:13

## 2020-01-21 RX ADMIN — DIGOXIN 125 MCG: 125 TABLET ORAL at 08:19

## 2020-01-21 RX ADMIN — ASPIRIN 325 MG: 325 TABLET ORAL at 08:19

## 2020-01-21 RX ADMIN — INSULIN LISPRO 5 UNITS: 100 INJECTION, SOLUTION INTRAVENOUS; SUBCUTANEOUS at 12:02

## 2020-01-21 NOTE — PROGRESS NOTES
Continued Stay Note  [facility]     Patient Name: Francisco Sequeira  MRN: [id number]  Today's Date: 1/21/2020    Admit Date: 1/13/2020    Discharge Plan     Row Name 01/21/20 1037       Plan    Plan  Plans are home with wife.      Plan Comments  [facility] spoke with pt and wife, pt conts to ambulate with wife around nursing unit.  Plans are home. Merged with [facility] will follow. Silvia Solano RN/CCCP        Discharge Codes    No documentation.             Silvia Solano RN

## 2020-01-21 NOTE — DISCHARGE SUMMARY
PHYSICIAN DISCHARGE SUMMARY                                                                        [facility]    Patient Identification:  Name: Francisco Sequeira  Age: 84 y.o.  Sex: male  :  1935  MRN: [id number]  Primary Care Physician: Rubin Hinkle APRN    Admit date: 2020  Discharge date and time:2020  Discharged Condition: good    Discharge Diagnoses:  Active Hospital Problems    Diagnosis  POA   • **Acute cerebral infarction (CMS/HCC) [I63.9]  Yes   • BYRON (acute kidney injury) (CMS/HCC) [N17.9]  Yes   • Stroke-like symptom [R29.90]  Yes   • Kidney carcinoma (CMS/HCC) [C64.9]  Yes   • CKD (chronic kidney disease) stage 3, GFR 30-59 ml/min (CMS/Formerly [facility]) [N18.3]  Yes   • Hx of aortic valve replacement, mechanical [Z95.2] [Z95.2]  Not Applicable   • Uncontrolled type 2 diabetes mellitus (CMS/Former[facility]) [E11.65]  Yes   • Hyperlipidemia [E78.5]  Yes   • Atrial fibrillation (CMS/HCC) [I48.91]  Yes   • Hypertension [I10]  Yes      Resolved Hospital Problems   No resolved problems to display.          PMHX:   Past Medical History:   Diagnosis Date   • Abnormal electrocardiogram    • Allergic rhinitis    • Aortic valve insufficiency    • Ascending aortic aneurysm (CMS/HCC)    • Atrial fibrillation (CMS/HCC)    • Bacteremia    • Calcific aortic stenosis of bicuspid valve    • Cardiac arrest (CMS/HCC)    • Cardiomyopathy (CMS/HCC)    • Contact dermatitis due to poison ivy    • Diabetes mellitus (CMS/HCC)    • Elbow fracture    • Esophageal reflux    • GERD (gastroesophageal reflux disease)    • Head injury    • Hyperglycemia    • Hyperlipidemia    • Hypertension    • Kidney carcinoma (CMS/HCC)    • Leg swelling    • Localized swelling of both lower legs    • Nasal congestion    • Nasal drainage    • Nonischemic cardiomyopathy (CMS/HCC)    • Palpitations    • Pedal edema    • Pleural effusion on left    • Renal insufficiency syndrome     • Renal oncocytoma    • Seasonal allergic reaction    • Sinusitis    • Syncope    • Type 2 diabetes mellitus (CMS/HCC)     uncontrolled   • Vision changes    • Visual field defect      PSHX:   Past Surgical History:   Procedure Laterality Date   • AORTIC VALVE REPAIR/REPLACEMENT     • ASCENDING AORTIC ANEURYSM REPAIR W/ MECHANICAL AORTIC VALVE REPLACEMENT     • NEPHRECTOMY     • OTHER SURGICAL HISTORY      elbow surgery   • PROSTATE SURGERY     • THORACENTESIS Left     diagnostic       Hospital Course: Francisco Sequeira  is a 84 y.o. former smoker with a history of atrial fibrillation, HLD, HTN, aortic valve insufficiency s/p aortic metal heart valve, NICM, kidney cancer s/p nephrectomy, CKD3 and DM2 that presents to [facility] complaining of weakness. The patient states his symptoms began last night when he noticed he was having trouble walking. He felt like his legs were weak and that they were not working right. He woke up his wife and was noticed to be holding onto walls to get around. He thinks his left leg may have been slightly weaker than the right. He denies any speech, vision changes or other unilateral weakness. He also denies any nausea, vomiting, diarrhea, chest pain or dyspnea. He denies any prior history of CVA or TIA and takes his Coumadin without missed doses for his atrial fibrillation and valve. He follows with Dr. Fried for his Cardiologist. He denies any dysuria or urinary symptoms as well. In the ED, lab work showed a normal CBC, negative troponin and creatinine of 2.04. His baseline creatinine appears to be around 1.7 to 1.8. CT of his head was negative for acute findings. However, a soft tissue thickening over the right parietal calvarium was noted possibly from resolving scalp contusion. CXR was negative for acute findings. He is being admitted for his weakness. UA did show 21-30 WBC and 2+ bacteria. Negative nitrites. He appears in no apparent distress at this time.            The patient was admitted to the hospital and seen by cardiology and neurology.  Patient had neurologic work-up including MRI which showed some small strokes which were probably embolic.  Patient received that aspirin and statin.  Patient also continued on Coumadin and it was recommended he try and get his INR between 3 and 3.5.  He will also take an aspirin daily.  His INR was right at 3 on the day of discharge and he looked well enough to go home.  He will follow-up with the Coumadin clinic in 2 days and get another pro time INR.  We will go with 7.5 mg of Coumadin daily.  He will also follow-up with his primary care and his other specialist.  Neurology wants to see him in 3 months for follow-up.    Consults:     Consults     Date and Time Order Name Status Description    1/14/2020 1542 Inpatient Cardiology Consult Completed     1/13/2020 1644 Inpatient Neurology Consult Stroke Completed     1/13/2020 1116 Inpatient Neurology Consult Stroke Completed     1/13/2020 1101 [facility] (on-call MD unless specified) Details Completed         Results from last 7 days   Lab Units 01/21/20  0511   WBC 10*3/mm3 4.93   HEMOGLOBIN g/dL 15.2   HEMATOCRIT % 44.9   PLATELETS 10*3/mm3 108*     Results from last 7 days   Lab Units 01/21/20  0511   SODIUM mmol/L 137   POTASSIUM mmol/L 4.4   CHLORIDE mmol/L 102   CO2 mmol/L 24.0   BUN mg/dL 33*   CREATININE mg/dL 1.38*   GLUCOSE mg/dL 171*   CALCIUM mg/dL 9.4     Significant Diagnostic Studies:   WBC   Date Value Ref Range Status   01/21/2020 4.93 3.40 - 10.80 10*3/mm3 Final     Hemoglobin   Date Value Ref Range Status   01/21/2020 15.2 13.0 - 17.7 g/dL Final     Hematocrit   Date Value Ref Range Status   01/21/2020 44.9 37.5 - 51.0 % Final     Platelets   Date Value Ref Range Status   01/21/2020 108 (L) 140 - 450 10*3/mm3 Final     Sodium   Date Value Ref Range Status   01/21/2020 137 136 - 145 mmol/L Final     Potassium   Date Value Ref Range Status   01/21/2020 4.4 3.5 - 5.2 mmol/L  Final     Chloride   Date Value Ref Range Status   01/21/2020 102 98 - 107 mmol/L Final     CO2   Date Value Ref Range Status   01/21/2020 24.0 22.0 - 29.0 mmol/L Final     BUN   Date Value Ref Range Status   01/21/2020 33 (H) 8 - 23 mg/dL Final     Creatinine   Date Value Ref Range Status   01/21/2020 1.38 (H) 0.76 - 1.27 mg/dL Final     Glucose   Date Value Ref Range Status   01/21/2020 171 (H) 65 - 99 mg/dL Final     Calcium   Date Value Ref Range Status   01/21/2020 9.4 8.6 - 10.5 mg/dL Final     No results found for: AST, ALT, ALKPHOS  INR   Date Value Ref Range Status   01/21/2020 3.03 (H) 0.90 - 1.10 Final     No results found for: COLORU, CLARITYU, SPECGRAV, PHUR, PROTEINUR, GLUCOSEU, KETONESU, BLOODU, NITRITE, LEUKOCYTESUR, BILIRUBINUR, UROBILINOGEN, RBCUA, WBCUA, BACTERIA, UACOMMENT  No results found for: TROPONINT, TROPONINI, BNP  No components found for: HGBA1C;2  No components found for: TSH;2  Imaging Results (All)     Procedure Component Value Units Date/Time    MRI Brain Without Contrast [[id number]] Collected:  01/14/20 1113     Updated:  01/14/20 1129    Narrative:       MRI BRAIN WO CONTRAST-     CLINICAL HISTORY: Confusion. History of prostate cancer and kidney  cancer. Lower extremity weakness.     TECHNIQUE: Multiple axial T1, T2, gradient echo and diffusion images  were acquired. Sagittal T1-weighted images were also obtained.      COMPARISON: CT scan of the head dated 01/13/2020     FINDINGS: There is mild diffuse cortical atrophy. There are several  small nodular foci of T2 hyperintensity in the periventricular white  matter of both cerebral hemispheres, most of which show no corresponding  restricted diffusion, and are consistent with sequela of mild small  vessel chronic ischemic change. However, there is a cluster of  approximately 3 small foci of restricted diffusion in the  periventricular white matter of the posterior aspect of the right  frontal lobe that are consistent with acute  white matter infarcts. These  measure approximately 1.7 x 0.7 cm in aggregate size. There is no  significant associated mass effect. No cortical infarcts are identified.  There are no masses. Normal flow voids are observed in the major  vascular structures. The paranasal sinuses appear well aerated.       Impression:       Mild cortical atrophy and evidence of mild small vessel  chronic ischemic change as described. A small cluster of acute white  matter infarcts are present within the periventricular white matter of  the right frontal lobe posteriorly. Otherwise unremarkable MRI of the  brain.     This report was finalized on 1/14/2020 11:26 AM by Dr. Francisco Zee M.D.       CT Head Without Contrast [[id number]] Collected:  01/13/20 0820     Updated:  01/13/20 0839    Narrative:       CT HEAD     HISTORY:Lower extremity weakness, left greater than right     TECHNIQUE: CT scan of the head was obtained with 3 mm axial images  without intravenous contrast.  Radiation dose reduction techniques were  utilized, including automated exposure control and exposure modulation  based on body size.     COMPARISON:CT head 01/13/2016     FINDINGS:  There is no finding of acute infarct, hemorrhage, contusion or abnormal  extra-axial collection. No hydrocephalus is present. Focal subcutaneous  soft tissue thickening over the right parietal calvarium, possibly  representing a resolving scalp contusion.       Impression:       1.  No findings of acute intracranial abnormality.  2.  Other findings as above.        This report was finalized on 1/13/2020 8:36 AM by Dr. Antonio Hart M.D.       XR Chest 1 View [[id number]] Collected:  01/13/20 0801     Updated:  01/13/20 0805    Narrative:       XR CHEST 1 VW-  01/13/2020     HISTORY: Weakness. Shortness of breath.     Heart size is mildly enlarged. Lungs appear free of acute infiltrates.  Sternotomy wires are seen. Bony structures appear unremarkable.       Impression:       Mild  cardiomegaly.  2. Lungs appear clear.     This report was finalized on 1/13/2020 8:02 AM by Dr. Onel Capps M.D.           Lab Results (last 7 days)     Procedure Component Value Units Date/Time    POC Glucose Once [[id number]]  (Abnormal) Collected:  01/21/20 1118    Specimen:  Blood Updated:  01/21/20 1123     Glucose 310 mg/dL     POC Glucose Once [[id number]]  (Abnormal) Collected:  01/21/20 0644    Specimen:  Blood Updated:  01/21/20 0654     Glucose 223 mg/dL     Basic Metabolic Panel [[id number]]  (Abnormal) Collected:  01/21/20 0511    Specimen:  Blood Updated:  01/21/20 0623     Glucose 171 mg/dL      BUN 33 mg/dL      Creatinine 1.38 mg/dL      Sodium 137 mmol/L      Potassium 4.4 mmol/L      Chloride 102 mmol/L      CO2 24.0 mmol/L      Calcium 9.4 mg/dL      eGFR Non African Amer 49 mL/min/1.73      BUN/Creatinine Ratio 23.9     Anion Gap 11.0 mmol/L     Narrative:       GFR Normal >60  Chronic Kidney Disease <60  Kidney Failure <15      Protime-INR [[id number]]  (Abnormal) Collected:  01/21/20 0511    Specimen:  Blood Updated:  01/21/20 0604     Protime 31.1 Seconds      INR 3.03    CBC & Differential [[id number]] Collected:  01/21/20 0511    Specimen:  Blood Updated:  01/21/20 0554    Narrative:       The following orders were created for panel order CBC & Differential.  Procedure                               Abnormality         Status                     ---------                               -----------         ------                     CBC Auto Differential[[id number]]        Abnormal            Final result                 Please view results for these tests on the individual orders.    CBC Auto Differential [[id number]]  (Abnormal) Collected:  01/21/20 0511    Specimen:  Blood Updated:  01/21/20 0554     WBC 4.93 10*3/mm3      RBC 5.15 10*6/mm3      Hemoglobin 15.2 g/dL      Hematocrit 44.9 %      MCV 87.2 fL      MCH 29.5 pg      MCHC 33.9 g/dL      RDW 14.0 %      RDW-SD 44.2 fl      MPV 12.3  fL      Platelets 108 10*3/mm3      Neutrophil % 65.4 %      Lymphocyte % 22.1 %      Monocyte % 8.9 %      Eosinophil % 2.2 %      Basophil % 1.0 %      Immature Grans % 0.4 %      Neutrophils, Absolute 3.22 10*3/mm3      Lymphocytes, Absolute 1.09 10*3/mm3      Monocytes, Absolute 0.44 10*3/mm3      Eosinophils, Absolute 0.11 10*3/mm3      Basophils, Absolute 0.05 10*3/mm3      Immature Grans, Absolute 0.02 10*3/mm3      nRBC 0.0 /100 WBC     POC Glucose Once [[id number]]  (Abnormal) Collected:  01/20/20 2143    Specimen:  Blood Updated:  01/20/20 2145     Glucose 302 mg/dL     POC Glucose Once [[id number]]  (Abnormal) Collected:  01/20/20 1605    Specimen:  Blood Updated:  01/20/20 1606     Glucose 274 mg/dL     POC Glucose Once [[id number]]  (Abnormal) Collected:  01/20/20 1103    Specimen:  Blood Updated:  01/20/20 1105     Glucose 260 mg/dL     Basic Metabolic Panel [[id number]]  (Abnormal) Collected:  01/20/20 0540    Specimen:  Blood Updated:  01/20/20 0629     Glucose 183 mg/dL      BUN 29 mg/dL      Creatinine 1.34 mg/dL      Sodium 139 mmol/L      Potassium 4.6 mmol/L      Chloride 103 mmol/L      CO2 23.5 mmol/L      Calcium 9.3 mg/dL      eGFR Non African Amer 51 mL/min/1.73      BUN/Creatinine Ratio 21.6     Anion Gap 12.5 mmol/L     Narrative:       GFR Normal >60  Chronic Kidney Disease <60  Kidney Failure <15      POC Glucose Once [[id number]]  (Abnormal) Collected:  01/20/20 0617    Specimen:  Blood Updated:  01/20/20 0621     Glucose 193 mg/dL     Protime-INR [[id number]]  (Abnormal) Collected:  01/20/20 0540    Specimen:  Blood Updated:  01/20/20 0617     Protime 28.5 Seconds      INR 2.71    CBC & Differential [[id number]] Collected:  01/20/20 0540    Specimen:  Blood Updated:  01/20/20 0608    Narrative:       The following orders were created for panel order CBC & Differential.  Procedure                               Abnormality         Status                     ---------                                -----------         ------                     CBC Auto Differential[[id number]]        Abnormal            Final result                 Please view results for these tests on the individual orders.    CBC Auto Differential [[id number]]  (Abnormal) Collected:  01/20/20 0540    Specimen:  Blood Updated:  01/20/20 0608     WBC 4.73 10*3/mm3      RBC 5.31 10*6/mm3      Hemoglobin 15.0 g/dL      Hematocrit 45.3 %      MCV 85.3 fL      MCH 28.2 pg      MCHC 33.1 g/dL      RDW 14.2 %      RDW-SD 44.1 fl      MPV 11.8 fL      Platelets 109 10*3/mm3      Neutrophil % 64.6 %      Lymphocyte % 24.5 %      Monocyte % 7.6 %      Eosinophil % 2.3 %      Basophil % 0.8 %      Immature Grans % 0.2 %      Neutrophils, Absolute 3.05 10*3/mm3      Lymphocytes, Absolute 1.16 10*3/mm3      Monocytes, Absolute 0.36 10*3/mm3      Eosinophils, Absolute 0.11 10*3/mm3      Basophils, Absolute 0.04 10*3/mm3      Immature Grans, Absolute 0.01 10*3/mm3      nRBC 0.0 /100 WBC     POC Glucose Once [[id number]]  (Abnormal) Collected:  01/19/20 2118    Specimen:  Blood Updated:  01/19/20 2120     Glucose 354 mg/dL     POC Glucose Once [[id number]]  (Abnormal) Collected:  01/19/20 1658    Specimen:  Blood Updated:  01/19/20 1707     Glucose 262 mg/dL     POC Glucose Once [[id number]]  (Abnormal) Collected:  01/19/20 1114    Specimen:  Blood Updated:  01/19/20 1118     Glucose 254 mg/dL     Basic Metabolic Panel [[id number]]  (Abnormal) Collected:  01/19/20 0838    Specimen:  Blood Updated:  01/19/20 0926     Glucose 302 mg/dL      BUN 33 mg/dL      Creatinine 1.35 mg/dL      Sodium 137 mmol/L      Potassium 4.7 mmol/L      Chloride 100 mmol/L      CO2 24.5 mmol/L      Calcium 9.2 mg/dL      eGFR Non African Amer 51 mL/min/1.73      BUN/Creatinine Ratio 24.4     Anion Gap 12.5 mmol/L     Narrative:       GFR Normal >60  Chronic Kidney Disease <60  Kidney Failure <15      Protime-INR [[id number]]  (Abnormal) Collected:  01/19/20 0621    Specimen:  Blood  Updated:  01/19/20 0705     Protime 25.3 Seconds      INR 2.34    CBC & Differential [[id number]] Collected:  01/19/20 0621    Specimen:  Blood Updated:  01/19/20 0646    Narrative:       The following orders were created for panel order CBC & Differential.  Procedure                               Abnormality         Status                     ---------                               -----------         ------                     CBC Auto Differential[[id number]]        Abnormal            Final result                 Please view results for these tests on the individual orders.    CBC Auto Differential [[id number]]  (Abnormal) Collected:  01/19/20 0621    Specimen:  Blood Updated:  01/19/20 0646     WBC 4.81 10*3/mm3      RBC 5.27 10*6/mm3      Hemoglobin 15.0 g/dL      Hematocrit 45.0 %      MCV 85.4 fL      MCH 28.5 pg      MCHC 33.3 g/dL      RDW 13.7 %      RDW-SD 42.7 fl      MPV 12.3 fL      Platelets 129 10*3/mm3      Neutrophil % 69.0 %      Lymphocyte % 20.6 %      Monocyte % 7.3 %      Eosinophil % 2.1 %      Basophil % 0.8 %      Immature Grans % 0.2 %      Neutrophils, Absolute 3.32 10*3/mm3      Lymphocytes, Absolute 0.99 10*3/mm3      Monocytes, Absolute 0.35 10*3/mm3      Eosinophils, Absolute 0.10 10*3/mm3      Basophils, Absolute 0.04 10*3/mm3      Immature Grans, Absolute 0.01 10*3/mm3      nRBC 0.0 /100 WBC     POC Glucose Once [[id number]]  (Abnormal) Collected:  01/19/20 0623    Specimen:  Blood Updated:  01/19/20 0625     Glucose 177 mg/dL     POC Glucose Once [[id number]]  (Abnormal) Collected:  01/18/20 2107    Specimen:  Blood Updated:  01/18/20 2109     Glucose 305 mg/dL     POC Glucose Once [[id number]]  (Abnormal) Collected:  01/18/20 1655    Specimen:  Blood Updated:  01/18/20 1702     Glucose 262 mg/dL     POC Glucose Once [[id number]]  (Abnormal) Collected:  01/18/20 1114    Specimen:  Blood Updated:  01/18/20 1120     Glucose 303 mg/dL     Basic Metabolic Panel [[id number]]  (Abnormal)  Collected:  01/18/20 0607    Specimen:  Blood Updated:  01/18/20 0717     Glucose 203 mg/dL      BUN 32 mg/dL      Creatinine 1.34 mg/dL      Sodium 138 mmol/L      Potassium 4.7 mmol/L      Chloride 103 mmol/L      CO2 23.0 mmol/L      Calcium 9.3 mg/dL      eGFR Non African Amer 51 mL/min/1.73      BUN/Creatinine Ratio 23.9     Anion Gap 12.0 mmol/L     Narrative:       GFR Normal >60  Chronic Kidney Disease <60  Kidney Failure <15      Protime-INR [[id number]]  (Abnormal) Collected:  01/18/20 0607    Specimen:  Blood Updated:  01/18/20 0717     Protime 21.8 Seconds      INR 1.94    CBC & Differential [[id number]] Collected:  01/18/20 0607    Specimen:  Blood Updated:  01/18/20 0704    Narrative:       The following orders were created for panel order CBC & Differential.  Procedure                               Abnormality         Status                     ---------                               -----------         ------                     CBC Auto Differential[[id number]]        Abnormal            Final result                 Please view results for these tests on the individual orders.    CBC Auto Differential [[id number]]  (Abnormal) Collected:  01/18/20 0607    Specimen:  Blood Updated:  01/18/20 0704     WBC 5.52 10*3/mm3      RBC 5.38 10*6/mm3      Hemoglobin 15.0 g/dL      Hematocrit 45.8 %      MCV 85.1 fL      MCH 27.9 pg      MCHC 32.8 g/dL      RDW 13.5 %      RDW-SD 41.3 fl      MPV 12.4 fL      Platelets 106 10*3/mm3      Neutrophil % 69.5 %      Lymphocyte % 20.3 %      Monocyte % 7.4 %      Eosinophil % 2.2 %      Basophil % 0.4 %      Immature Grans % 0.2 %      Neutrophils, Absolute 3.84 10*3/mm3      Lymphocytes, Absolute 1.12 10*3/mm3      Monocytes, Absolute 0.41 10*3/mm3      Eosinophils, Absolute 0.12 10*3/mm3      Basophils, Absolute 0.02 10*3/mm3      Immature Grans, Absolute 0.01 10*3/mm3      nRBC 0.0 /100 WBC     POC Glucose Once [[id number]]  (Abnormal) Collected:  01/18/20 0633     Specimen:  Blood Updated:  01/18/20 0634     Glucose 217 mg/dL     POC Glucose Once [[id number]]  (Abnormal) Collected:  01/17/20 2102    Specimen:  Blood Updated:  01/17/20 2104     Glucose 279 mg/dL     POC Glucose Once [[id number]]  (Abnormal) Collected:  01/17/20 1617    Specimen:  Blood Updated:  01/17/20 1618     Glucose 244 mg/dL     POC Glucose Once [[id number]]  (Abnormal) Collected:  01/17/20 1056    Specimen:  Blood Updated:  01/17/20 1057     Glucose 233 mg/dL     POC Glucose Once [[id number]]  (Abnormal) Collected:  01/17/20 0621    Specimen:  Blood Updated:  01/17/20 0623     Glucose 187 mg/dL     Basic Metabolic Panel [[id number]]  (Abnormal) Collected:  01/17/20 0539    Specimen:  Blood Updated:  01/17/20 0618     Glucose 210 mg/dL      BUN 33 mg/dL      Creatinine 1.42 mg/dL      Sodium 140 mmol/L      Potassium 4.7 mmol/L      Chloride 104 mmol/L      CO2 23.9 mmol/L      Calcium 9.1 mg/dL      eGFR Non African Amer 48 mL/min/1.73      BUN/Creatinine Ratio 23.2     Anion Gap 12.1 mmol/L     Narrative:       GFR Normal >60  Chronic Kidney Disease <60  Kidney Failure <15      Protime-INR [[id number]]  (Abnormal) Collected:  01/17/20 0539    Specimen:  Blood Updated:  01/17/20 0604     Protime 17.8 Seconds      INR 1.50    CBC & Differential [[id number]] Collected:  01/17/20 0539    Specimen:  Blood Updated:  01/17/20 0554    Narrative:       The following orders were created for panel order CBC & Differential.  Procedure                               Abnormality         Status                     ---------                               -----------         ------                     CBC Auto Differential[[id number]]        Abnormal            Final result                 Please view results for these tests on the individual orders.    CBC Auto Differential [[id number]]  (Abnormal) Collected:  01/17/20 0539    Specimen:  Blood Updated:  01/17/20 0554     WBC 6.01 10*3/mm3      RBC 5.36 10*6/mm3      Hemoglobin  15.4 g/dL      Hematocrit 47.0 %      MCV 87.7 fL      MCH 28.7 pg      MCHC 32.8 g/dL      RDW 13.9 %      RDW-SD 44.3 fl      MPV 12.2 fL      Platelets 109 10*3/mm3      Neutrophil % 75.4 %      Lymphocyte % 15.3 %      Monocyte % 7.0 %      Eosinophil % 1.5 %      Basophil % 0.5 %      Immature Grans % 0.3 %      Neutrophils, Absolute 4.53 10*3/mm3      Lymphocytes, Absolute 0.92 10*3/mm3      Monocytes, Absolute 0.42 10*3/mm3      Eosinophils, Absolute 0.09 10*3/mm3      Basophils, Absolute 0.03 10*3/mm3      Immature Grans, Absolute 0.02 10*3/mm3      nRBC 0.0 /100 WBC     POC Glucose Once [[id number]]  (Abnormal) Collected:  01/16/20 2136    Specimen:  Blood Updated:  01/16/20 2137     Glucose 244 mg/dL     POC Glucose Once [[id number]]  (Abnormal) Collected:  01/16/20 1708    Specimen:  Blood Updated:  01/16/20 1712     Glucose 285 mg/dL     Basic Metabolic Panel [[id number]]  (Abnormal) Collected:  01/16/20 0559    Specimen:  Blood Updated:  01/16/20 0651     Glucose 167 mg/dL      BUN 30 mg/dL      Creatinine 1.46 mg/dL      Sodium 138 mmol/L      Potassium 4.8 mmol/L      Chloride 104 mmol/L      CO2 21.0 mmol/L      Calcium 9.4 mg/dL      eGFR Non African Amer 46 mL/min/1.73      BUN/Creatinine Ratio 20.5     Anion Gap 13.0 mmol/L     Narrative:       GFR Normal >60  Chronic Kidney Disease <60  Kidney Failure <15      POC Glucose Once [[id number]]  (Abnormal) Collected:  01/16/20 0643    Specimen:  Blood Updated:  01/16/20 0644     Glucose 157 mg/dL     Protime-INR [[id number]]  (Abnormal) Collected:  01/16/20 0559    Specimen:  Blood Updated:  01/16/20 0640     Protime 18.6 Seconds      INR 1.59    POC Glucose Once [[id number]]  (Abnormal) Collected:  01/15/20 2134    Specimen:  Blood Updated:  01/15/20 2136     Glucose 238 mg/dL     POC Glucose Once [[id number]]  (Abnormal) Collected:  01/15/20 1655    Specimen:  Blood Updated:  01/15/20 1709     Glucose 200 mg/dL     POC Glucose Once [[id number]]  (Abnormal)  Collected:  01/15/20 1123    Specimen:  Blood Updated:  01/15/20 1133     Glucose 222 mg/dL     Vitamin B12 [[id number]]  (Normal) Collected:  01/13/20 0747    Specimen:  Blood Updated:  01/15/20 1115     Vitamin B-12 381 pg/mL     Narrative:       Results may be falsely increased if patient taking Biotin.      Folate [[id number]]  (Normal) Collected:  01/13/20 0747    Specimen:  Blood Updated:  01/15/20 1115     Folate 9.32 ng/mL     Narrative:       Results may be falsely increased if patient taking Biotin.      TSH [[id number]]  (Normal) Collected:  01/15/20 0646    Specimen:  Blood from Hand, Left Updated:  01/15/20 1106     TSH 3.250 uIU/mL     Basic Metabolic Panel [[id number]]  (Abnormal) Collected:  01/15/20 0646    Specimen:  Blood from Hand, Left Updated:  01/15/20 0726     Glucose 131 mg/dL      BUN 25 mg/dL      Creatinine 1.42 mg/dL      Sodium 137 mmol/L      Potassium 4.8 mmol/L      Chloride 104 mmol/L      CO2 22.2 mmol/L      Calcium 9.0 mg/dL      eGFR Non African Amer 48 mL/min/1.73      BUN/Creatinine Ratio 17.6     Anion Gap 10.8 mmol/L     Narrative:       GFR Normal >60  Chronic Kidney Disease <60  Kidney Failure <15      Protime-INR [[id number]]  (Abnormal) Collected:  01/15/20 0646    Specimen:  Blood from Hand, Left Updated:  01/15/20 0719     Protime 20.6 Seconds      INR 1.80    CBC & Differential [[id number]] Collected:  01/15/20 0646    Specimen:  Blood from Hand, Left Updated:  01/15/20 0703    Narrative:       The following orders were created for panel order CBC & Differential.  Procedure                               Abnormality         Status                     ---------                               -----------         ------                     CBC Auto Differential[[id number]]        Abnormal            Final result                 Please view results for these tests on the individual orders.    CBC Auto Differential [[id number]]  (Abnormal) Collected:  01/15/20 0646    Specimen:   "Blood from Hand, Left Updated:  01/15/20 0703     WBC 5.54 10*3/mm3      RBC 5.35 10*6/mm3      Hemoglobin 15.1 g/dL      Hematocrit 46.0 %      MCV 86.0 fL      MCH 28.2 pg      MCHC 32.8 g/dL      RDW 13.7 %      RDW-SD 42.8 fl      MPV 12.1 fL      Platelets 101 10*3/mm3      Neutrophil % 69.1 %      Lymphocyte % 20.9 %      Monocyte % 6.9 %      Eosinophil % 2.3 %      Basophil % 0.4 %      Immature Grans % 0.4 %      Neutrophils, Absolute 3.83 10*3/mm3      Lymphocytes, Absolute 1.16 10*3/mm3      Monocytes, Absolute 0.38 10*3/mm3      Eosinophils, Absolute 0.13 10*3/mm3      Basophils, Absolute 0.02 10*3/mm3      Immature Grans, Absolute 0.02 10*3/mm3      nRBC 0.0 /100 WBC     POC Glucose Once [[id number]]  (Normal) Collected:  01/15/20 0602    Specimen:  Blood Updated:  01/15/20 0606     Glucose 120 mg/dL     POC Glucose Once [[id number]]  (Abnormal) Collected:  01/14/20 2022    Specimen:  Blood Updated:  01/14/20 2026     Glucose 246 mg/dL     C-reactive Protein [[id number]]  (Normal) Collected:  01/14/20 1820    Specimen:  Blood Updated:  01/14/20 1853     C-Reactive Protein 0.30 mg/dL     POC Glucose Once [[id number]]  (Abnormal) Collected:  01/14/20 1717    Specimen:  Blood Updated:  01/14/20 1724     Glucose 153 mg/dL         /69 (BP Location: Right arm, Patient Position: Sitting)   Pulse 78   Temp 97.9 °F (36.6 °C) (Oral)   Resp 18   Ht 182 cm (71.65\")   Wt 93.9 kg (207 lb 1.6 oz)   SpO2 95%   BMI 28.36 kg/m²     Discharge Exam:  General Appearance:    Alert, cooperative, no distress                          Head:    Normocephalic, without obvious abnormality, atraumatic                          Eyes:                            Throat:   Lips, tongue, gums normal                          Neck:   Supple, symmetrical, trachea midline, no JVD                        Lungs:     Clear to auscultation bilaterally, respirations unlabored                Chest Wall:    No tenderness or deformity         "                Heart:    Regular rate and rhythm, S1 and S2 normal, no murmur,no  Rub or gallop                  Abdomen:     Soft, non-tender, bowel sounds active, no masses, no organomegaly                  Extremities:   Extremities normal, atraumatic, no cyanosis or edema                             Skin:   Skin is warm and dry,  no rashes or palpable lesions                  Neurologic:   no focal deficits noted     Disposition:  Home    Patient Instructions:      Discharge Medications      New Medications      Instructions Start Date   aspirin 325 MG tablet   325 mg, Oral, Daily   Start Date:  January 22, 2020     atorvastatin 40 MG tablet  Commonly known as:  LIPITOR   40 mg, Oral, Daily   Start Date:  January 22, 2020        Changes to Medications      Instructions Start Date   furosemide 20 MG tablet  Commonly known as:  LASIX  What changed:    · how much to take  · how to take this  · when to take this  · additional instructions   TAKE ONE TABLET BY MOUTH DAILY      insulin degludec 100 UNIT/ML solution pen-injector injection  Commonly known as:  TRESIBA FLEXTOUCH  What changed:    · how much to take  · when to take this   60 Units, Subcutaneous, Daily      sodium bicarbonate 650 MG tablet  What changed:    · medication strength  · how much to take   1,950 mg, Oral, 2 Times Daily      warfarin 7.5 MG tablet  Commonly known as:  COUMADIN  What changed:    · medication strength  · additional instructions   7.5 mg daily in the evening, Get INR on Thursday coumadin clinic goal 3-3.5         Continue These Medications      Instructions Start Date   ACCU-CHEK IFEANYI device   Test blood sugar tid      ACCU-CHEK FASTCLIX LANCETS misc   TEST BLOOD SUGAR 3 TO 4 TIMES A DAY      SAFETY LANCETS 21G misc   Test blood sugar twice a day      clobetasol 0.05 % external solution  Commonly known as:  TEMOVATE   Topical, 2 Times Daily      digoxin 125 MCG tablet  Commonly known as:  LANOXIN   TAKE ONE TABLET BY MOUTH DAILY       diphenhydrAMINE 25 MG tablet  Commonly known as:  BENADRYL   100 mg, Oral, 2 Times Daily      fish oil 1000 MG capsule capsule   4,000 mg, Oral, Daily With Breakfast      glucose blood test strip   Accu-Chek Darlene Plus In Vitro Strip; Patient Sig: Accu-Chek Darlene Plus In Vitro Strip CHECK BLOOD SUGAR THREE TIMES A DAY TO FOUR TIMES A DAY; 100; 5; 14-Sep-2015; Active      glucose blood test strip  Commonly known as:  ACCU-CHEK DARLENE PLUS   USE 3 TO 4 TIMES DAILY      guaiFENesin 600 MG 12 hr tablet  Commonly known as:  MUCINEX   Oral, Every 12 Hours      insulin lispro 100 UNIT/ML injection  Commonly known as:  HUMALOG   USE INSULIN AS DIRECTED IN V-GO PUMP      insulin lispro 100 UNIT/ML injection  Commonly known as:  HUMALOG   USE INSULIN AS DIRECTED IN V-GO PUMP      magnesium oxide 400 MG tablet  Commonly known as:  MAG-OX   400 mg, Oral, 2 Times Daily      metoprolol succinate XL 25 MG 24 hr tablet  Commonly known as:  TOPROL-XL   TAKE ONE TABLET BY MOUTH DAILY      Pen Needles 32G X 4 MM misc   USE AS DIRECTED      V-GO 40 kit   USE AS DIRECTED THREE TIMES A DAY           Future Appointments   Date Time Provider Department Center   1/27/2020  8:30 AM ANTI COAG CLINIC [facility] None   2/14/2020  7:30 AM Rubin Hinkle APRN [facility] None   4/21/2020 11:00 AM Marilee Finn APRN [facility]RO None   7/29/2020 11:00 AM Griffin Fried MD [facility]  [facility] None      Contact information for follow-up providers     Princess He APRN Follow up in 3 month(s).    Specialties:  Emergency Medicine, Neurology, Nurse Practitioner  Contact information:  [address]  [facility] [address]  [facility] [address]  [phone]             Rubin Hinkle APRN .    Specialties:  Family Medicine, Urgent Care  Contact information:  [address] [facility] [id number] [phone]             Rubin Hinkle APRN Follow up in 1 week(s).    Specialties:  Family Medicine, Urgent Care  Contact information:  [address] [facility]  [facility] [address]  [phone]                   Contact information for after-discharge care     Home Medical Care     [facility] .    Service:  Home Health Services  Contact information:  [address]  [facility] [address] [phone]                           Discharge Order (From admission, onward)     Start     Ordered    01/21/20 1242  Discharge patient  Once     Expected Discharge Date:  01/21/20    Discharge Disposition:  Home or Self Care    Physician of Record for Attribution - Please select from Treatment Team:  LEOBARDO JUAREZ [[id number]]    Review needed by CMO to determine Physician of Record:  No       Question Answer Comment   Physician of Record for Attribution - Please select from Treatment Team LEOBARDO JUAREZ    Review needed by CMO to determine Physician of Record No        01/21/20 1357                Total time spent discharging patient including evaluation,post hospitalization follow up,  medication and post hospitalization instructions and education total time exceeds 30 minutes.    Signed:  Leobardo Juarez MD  1/21/2020  12:50 PM

## 2020-01-22 ENCOUNTER — TRANSITIONAL CARE MANAGEMENT TELEPHONE ENCOUNTER (OUTPATIENT)
Dept: FAMILY MEDICINE CLINIC | Facility: CLINIC | Age: 85
End: 2020-01-22

## 2020-01-22 ENCOUNTER — READMISSION MANAGEMENT (OUTPATIENT)
Dept: CALL CENTER | Facility: HOSPITAL | Age: 85
End: 2020-01-22

## 2020-01-22 NOTE — PROGRESS NOTES
Case Management Discharge Note      Final Note: Pt DC'd home with [facility].  Confirmed w/ [facility]/ [facility]..    Provided post acute provider list?: Yes  Post Acute Provider List: Home Health, Nursing Home  Delivered To: Patient, Support Person  Method of Delivery: In person    Destination      No service has been selected for the patient.      Durable Medical Equipment      No service has been selected for the patient.      Dialysis/Infusion      No service has been selected for the patient.      Home Medical Care - Selection Complete      Service Provider Request Status Selected Services Address Phone Number Fax Number    [facility] Selected Home Health Services [address] [address] [phone]      Therapy      No service has been selected for the patient.      Community Resources      No service has been selected for the patient.             Final Discharge Disposition Code: 06 - home with home health care([facility])

## 2020-01-22 NOTE — OUTREACH NOTE
Spoke with pt, states he is feeling ok. States LE weakness continues, but was told this will take time to resolve. Appetite is good, eating/drinking normally. Denies dizziness, headaches, SOB. Confirms receipt and understanding of d/c orders and medications. Pt is sched to see Rubin Hinkle on 02/14/20 for a regular 15 min ov, I offered and encouraged pt to sched TCM hosp [facility] but he declined to do this.

## 2020-01-23 NOTE — OUTREACH NOTE
Prep Survey      Responses   Facility patient discharged from?  [place]   Is patient eligible?  Yes   Discharge diagnosis  Acute cerebral infarction    Does the patient have one of the following disease processes/diagnoses(primary or secondary)?  Stroke (TIA)   Does the patient have Home health ordered?  Yes   What is the Home health agency?   [facility]    Is there a DME ordered?  No   Prep survey completed?  Yes          Radha Varma RN

## 2020-01-24 ENCOUNTER — ANTICOAGULATION VISIT (OUTPATIENT)
Dept: PHARMACY | Facility: HOSPITAL | Age: 85
End: 2020-01-24

## 2020-01-24 ENCOUNTER — READMISSION MANAGEMENT (OUTPATIENT)
Dept: CALL CENTER | Facility: HOSPITAL | Age: 85
End: 2020-01-24

## 2020-01-24 DIAGNOSIS — Z95.2 HX OF AORTIC VALVE REPLACEMENT, MECHANICAL: ICD-10-CM

## 2020-01-24 LAB
INR PPP: 4.3 (ref 0.91–1.09)
PROTHROMBIN TIME: 51.4 SECONDS (ref 10–13.8)

## 2020-01-24 PROCEDURE — 36416 COLLJ CAPILLARY BLOOD SPEC: CPT

## 2020-01-24 PROCEDURE — 85610 PROTHROMBIN TIME: CPT

## 2020-01-24 PROCEDURE — G0463 HOSPITAL OUTPT CLINIC VISIT: HCPCS

## 2020-01-24 NOTE — PROGRESS NOTES
Anticoagulation Clinic Progress Note    Anticoagulation Summary  As of 2020    INR goal:   3.0-3.5   TTR:   75.4 % (1.3 y)   Prior goal:   2.5-3.5   INR used for dosin.3! 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] [Z95.2]             Anticoagulation Episode Summary     INR check location:       Preferred lab:       Send INR reminders to:    [facility]ICAL POOL    Comments:   New INR goal 3 - 3.5 (see 2020 hospitalization for CVA)      Anticoagulation Care Providers     Provider Role Specialty Phone number    Griffin Fried MD Referring Cardiology [phone]          Clinic Interview:  Patient Findings     Positives:   Signs/symptoms of thrombosis, Hospital admission    Negatives:   Signs/symptoms of bleeding, Laboratory test error suspected,   Change in health, Change in alcohol use, Change in activity, Upcoming   invasive procedure, Emergency department visit, Upcoming dental procedure,   Missed doses, Extra doses, Change in medications, Change in diet/appetite,   Bruising, Other complaints    Comments:   Recent admission r/t CVA; INR goal incr'd to 3-3.5.      Clinical Outcomes     Negatives:   Major bleeding event, Thromboembolic event,   Anticoagulation-related hospital admission, Anticoagulation-related ED   visit, Anticoagulation-related fatality    Comments:   Recent admission r/t CVA; INR goal incr'd to 3-3.5.        INR History:  Anticoagulation Monitoring 2019   INR 3.4 3.1 4.3   INR Date 2019   INR Goal 2.5-3.5 2.5-3.5 3.0-3.5   Trend Same Same Same   Last Week Total 45 mg 45 mg 57.5 mg   Next Week  Total 45 mg 45 mg 42.5 mg   Sun 7.5 mg 7.5 mg 7.5 mg   Mon 5 mg 5 mg -   Tue 7.5 mg 7.5 mg -   Wed 5 mg 5 mg -   Thu 7.5 mg 7.5 mg -   Fri 5 mg 5 mg 2.5 mg (1/24)   Sat 7.5 mg 7.5 mg 7.5 mg   Visit Report - - -   Some recent data might be hidden       Plan:  1. INR is Supratherapeutic today- see above in Anticoagulation Summary.  Will instruct Francisco Sequeira to Change their warfarin regimen- see above in Anticoagulation Summary.  2. Follow up in 3 days  3. Patient declines warfarin refills.  4. Verbal and written information provided. Patient expresses understanding and has no further questions at this time.    Vasquez Niño [facility]

## 2020-01-24 NOTE — OUTREACH NOTE
Stroke Week 1 Survey      Responses   Facility patient discharged from?  [place]   Does the patient have one of the following disease processes/diagnoses(primary or secondary)?  Stroke (TIA)   Is there a successful TCM telephone encounter documented?  Yes          Idalia Murdock RN

## 2020-01-27 ENCOUNTER — ANTICOAGULATION VISIT (OUTPATIENT)
Dept: PHARMACY | Facility: HOSPITAL | Age: 85
End: 2020-01-27

## 2020-01-27 DIAGNOSIS — Z95.2 HX OF AORTIC VALVE REPLACEMENT, MECHANICAL: ICD-10-CM

## 2020-01-27 LAB
INR PPP: 3.2 (ref 0.91–1.09)
PROTHROMBIN TIME: 39 SECONDS (ref 10–13.8)

## 2020-01-27 PROCEDURE — G0463 HOSPITAL OUTPT CLINIC VISIT: HCPCS

## 2020-01-27 PROCEDURE — 85610 PROTHROMBIN TIME: CPT

## 2020-01-27 PROCEDURE — 36416 COLLJ CAPILLARY BLOOD SPEC: CPT

## 2020-01-27 NOTE — PROGRESS NOTES
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] [Z95.2]             Anticoagulation Episode Summary     INR check location:       Preferred lab:       Send INR reminders to:   [facility] CLINICAL POOL    Comments:   New INR goal 3 - 3.5 (see 1/2020 hospitalization for CVA)      Anticoagulation Care Providers     Provider Role Specialty Phone number    Griffin Fried MD Referring Cardiology [phone]          Clinic Interview:  Patient Findings     Negatives:   Signs/symptoms of thrombosis, Signs/symptoms of bleeding,   Laboratory test error suspected, Change in health, Change in alcohol use,   Change in activity, Upcoming invasive procedure, Emergency department   visit, Upcoming dental procedure, Missed doses, Extra doses, Change in   medications, Change in diet/appetite, Hospital admission, Bruising, Other   complaints      Clinical Outcomes     Negatives:   Major bleeding event, Thromboembolic event,   Anticoagulation-related hospital admission, Anticoagulation-related ED   visit, Anticoagulation-related fatality        INR History:  Anticoagulation Monitoring 12/30/2019 1/24/2020 1/27/2020   INR 3.1 4.3 3.2   INR Date 12/30/2019 1/24/2020 1/27/2020   INR Goal 2.5-3.5 3.0-3.5 3.0-3.5   Trend Same Same Same   Last Week Total 45 mg 57.5 mg 47.5 mg   Next Week Total 45 mg 42.5 mg 47.5 mg   Sun 7.5 mg 7.5 mg -   Mon 5 mg - 7.5 mg (1/27)   Tue 7.5 mg - 7.5 mg   Wed 5 mg - 5 mg   Thu 7.5 mg - 7.5 mg   Fri 5 mg 2.5 mg (1/24) -   Sat  7.5 mg 7.5 mg -   Visit Report - - -   Some recent data might be hidden       Plan:  1. INR is Therapeutic today- see above in Anticoagulation Summary.  Will instruct Francisco Sequeira to Change their warfarin regimen- see above in Anticoagulation Summary.  2. Follow up in 4 days  3. Patient declines warfarin refills.  4. Verbal and written information provided. Patient expresses understanding and has no further questions at this time.    Vasquez Niño [facility]

## 2020-01-30 ENCOUNTER — READMISSION MANAGEMENT (OUTPATIENT)
Dept: CALL CENTER | Facility: HOSPITAL | Age: 85
End: 2020-01-30

## 2020-01-30 NOTE — OUTREACH NOTE
Stroke Week 2 Survey      Responses   Facility patient discharged from?  [place]   Does the patient have one of the following disease processes/diagnoses(primary or secondary)?  Stroke (TIA)   Week 2 attempt successful?  Yes   Call start time  1118   Call end time  1127   Discharge diagnosis  Acute cerebral infarction    Person spoke with today (if not patient) and relationship  Gladys-spouse   Meds reviewed with patient/caregiver?  Yes   Is the patient having any side effects they believe may be caused by any medication additions or changes?  No   Does the patient have all medications ordered at discharge?  Yes   Is the patient taking all medications as directed (includes completed medication regime)?  Yes   Does the patient have a primary care provider?   Yes   Does the patient have an appointment with their PCP within 7 days of discharge?  Greater than 7 days   Comments regarding PCP  Appt with PCP 2/14/20   What is preventing the patient from scheduling follow up appointments within 7 days of discharge?  Earlier appointment not available   Nursing Interventions  Verified appointment date/time/provider, Educated patient on importance of making appointment   Has the patient kept scheduled appointments due by today?  N/A   Comments  PT/INR drawn on on 1/27/20. Will have it checked again on 1/31/20.   What is the Home health agency?   [facility]    Has home health visited the patient within 72 hours of discharge?  Yes   Psychosocial issues?  No   Does the patient require any assistance with activities of daily living such as eating, bathing, dressing, walking, etc.?  No   ADL comments  Pt ambulates with a walker   Does the patient have any residual symptoms from stroke/TIA?  No   Does the patient understand the diet ordered at discharge?  No   Did the patient receive a copy of their discharge instructions?  Yes   Nursing interventions  Reviewed instructions with patient, Educated on [place]   What is the patient's  perception of their health status since discharge?  Improving   Nursing interventions  Nurse provided patient education   Is the patient able to teach back FAST for Stroke?  Yes   Is the patient/caregiver able to teach back the risk factors for a stroke?  High blood pressure-goal below 120/80, High Cholesterol, Smoking, Diabetes [nonsmoker, pt is a diabetic]   Is the patient/caregiver able to teach back signs and symptoms related to disease process for when to call PCP?  Yes   Is the patient/caregiver able to teach back signs and symptoms related to disease process for when to call 911?  Yes   If the patient is a current smoker, are they able to teach back resources for cessation?  -- [nonsmoker]   Is the patient/caregiver able to teach back the hierarchy of who to call/visit for symptoms/problems? PCP, Specialist, Home health nurse, Urgent Care, ED, 911  Yes   Week 2 call completed?  Yes   Wrap up additional comments  Gladys started back to work on 1/28/20          Idalia Murdock RN

## 2020-01-31 ENCOUNTER — ANTICOAGULATION VISIT (OUTPATIENT)
Dept: PHARMACY | Facility: HOSPITAL | Age: 85
End: 2020-01-31

## 2020-01-31 DIAGNOSIS — Z95.2 HX OF AORTIC VALVE REPLACEMENT, MECHANICAL: ICD-10-CM

## 2020-01-31 LAB
INR PPP: 3.3 (ref 0.91–1.09)
PROTHROMBIN TIME: 39.7 SECONDS (ref 10–13.8)

## 2020-01-31 PROCEDURE — 36416 COLLJ CAPILLARY BLOOD SPEC: CPT

## 2020-01-31 PROCEDURE — 85610 PROTHROMBIN TIME: CPT

## 2020-01-31 NOTE — PROGRESS NOTES
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] [Z95.2]             Anticoagulation Episode Summary     INR check location:       Preferred lab:       Send INR reminders to:    [facility] POOL    Comments:   New INR goal 3 - 3.5 (see 1/2020 hospitalization for CVA)      Anticoagulation Care Providers     Provider Role Specialty Phone number    Griffin Fried MD Referring Cardiology [phone]          Clinic Interview:  Patient Findings     Negatives:   Signs/symptoms of thrombosis, Signs/symptoms of bleeding,   Laboratory test error suspected, Change in health, Change in alcohol use,   Change in activity, Upcoming invasive procedure, Emergency department   visit, Upcoming dental procedure, Missed doses, Extra doses, Change in   medications, Change in diet/appetite, Hospital admission, Bruising, Other   complaints      Clinical Outcomes     Negatives:   Major bleeding event, Thromboembolic event,   Anticoagulation-related hospital admission, Anticoagulation-related ED   visit, Anticoagulation-related fatality        INR History:  Anticoagulation Monitoring 1/24/2020 1/27/2020 1/31/2020   INR 4.3 3.2 3.3   INR Date 1/24/2020 1/27/2020 1/31/2020   INR Goal 3.0-3.5 3.0-3.5 3.0-3.5   Trend Same Same Same   Last Week Total 57.5 mg 47.5 mg 45 mg   Next Week Total 42.5 mg 47.5 mg 45 mg   Sun 7.5 mg - 7.5 mg   Mon - 7.5 mg (1/27) 5 mg   Tue - 7.5 mg 7.5 mg   Wed - 5 mg 5 mg   Thu - 7.5 mg  7.5 mg   Fri 2.5 mg (1/24) - 5 mg   Sat 7.5 mg - 7.5 mg   Visit Report - - -   Some recent data might be hidden       Plan:  1. INR is therapeutic today- see above in Anticoagulation Summary.   Will instruct Francisco Sequeira to continue their warfarin regimen- see above in Anticoagulation Summary.  2. Follow up in 1 weeks.  3. Patient declines warfarin refills.  4. Verbal and written information provided. Patient expresses understanding and has no further questions at this time.    Princess Robin

## 2020-02-07 ENCOUNTER — ANTICOAGULATION VISIT (OUTPATIENT)
Dept: PHARMACY | Facility: HOSPITAL | Age: 85
End: 2020-02-07

## 2020-02-07 ENCOUNTER — READMISSION MANAGEMENT (OUTPATIENT)
Dept: CALL CENTER | Facility: HOSPITAL | Age: 85
End: 2020-02-07

## 2020-02-07 DIAGNOSIS — Z95.2 HX OF AORTIC VALVE REPLACEMENT, MECHANICAL: ICD-10-CM

## 2020-02-07 LAB
INR PPP: 3.9 (ref 0.91–1.09)
PROTHROMBIN TIME: 46.2 SECONDS (ref 10–13.8)

## 2020-02-07 PROCEDURE — G0463 HOSPITAL OUTPT CLINIC VISIT: HCPCS

## 2020-02-07 PROCEDURE — 36416 COLLJ CAPILLARY BLOOD SPEC: CPT

## 2020-02-07 PROCEDURE — 85610 PROTHROMBIN TIME: CPT

## 2020-02-07 RX ORDER — WARFARIN SODIUM 5 MG/1
TABLET ORAL
Qty: 120 TABLET | Refills: 0 | Status: SHIPPED | OUTPATIENT
Start: 2020-02-07 | End: 2020-05-11 | Stop reason: SDUPTHER

## 2020-02-07 NOTE — OUTREACH NOTE
Stroke Week 3 Survey      Responses   Facility patient discharged from?  [place]   Does the patient have one of the following disease processes/diagnoses(primary or secondary)?  Stroke (TIA)   Week 3 attempt successful?  Yes   Call start time  1011   Call end time  1015   Discharge diagnosis  Acute cerebral infarction    Meds reviewed with patient/caregiver?  Yes   Is the patient having any side effects they believe may be caused by any medication additions or changes?  No   Does the patient have all medications ordered at discharge?  Yes   Is the patient taking all medications as directed (includes completed medication regime)?  Yes   Does the patient have a primary care provider?   Yes   Does the patient have an appointment with their PCP within 7 days of discharge?  Yes   Has the patient kept scheduled appointments due by today?  Yes   What is the Home health agency?   [facility]    Has home health visited the patient within 72 hours of discharge?  Yes   Psychosocial issues?  No   Does the patient require any assistance with activities of daily living such as eating, bathing, dressing, walking, etc.?  No   Does the patient have any residual symptoms from stroke/TIA?  No   Does the patient understand the diet ordered at discharge?  Yes   Did the patient receive a copy of their discharge instructions?  Yes   Nursing interventions  Reviewed instructions with patient, Educated on MyChart   What is the patient's perception of their health status since discharge?  Improving   Nursing interventions  Nurse provided patient education   Is the patient able to teach back FAST for Stroke?  Yes   Is the patient/caregiver able to teach back the risk factors for a stroke?  High blood pressure-goal below 120/80, High Cholesterol, Smoking, Diabetes   Is the patient/caregiver able to teach back signs and symptoms related to disease process for when to call PCP?  Yes   Is the patient/caregiver able to teach back signs and  symptoms related to disease process for when to call 911?  Yes   Is the patient/caregiver able to teach back the hierarchy of who to call/visit for symptoms/problems? PCP, Specialist, Home health nurse, Urgent Care, ED, 911  Yes   Week 3 call completed?  Yes          Sravan Lynn RN

## 2020-02-07 NOTE — PROGRESS NOTES
Anticoagulation Clinic Progress Note    Anticoagulation Summary  As of 2/7/2020    INR goal:   3.0-3.5   TTR:   74.7 % (1.3 y)   INR used for dosing:   3.9! (2/7/2020)   Warfarin maintenance plan:   5 mg every Mon, Wed, Fri; 7.5 mg all other days   Weekly warfarin total:   45 mg   No change documented:   Vasquez Niño RPH   Plan last modified:   Marisol Merida RPH (9/19/2018)   Next INR check:   2/14/2020   Priority:   Maintenance   Target end date:   Indefinite    Indications    Atrial fibrillation (CMS/HCC) [I48.91]  Hx of aortic valve replacement  mechanical [Z95.2] [Z95.2]             Anticoagulation Episode Summary     INR check location:       Preferred lab:       Send INR reminders to:    [facility]    Comments:   New INR goal 3 - 3.5 (see 1/2020 hospitalization for CVA)      Anticoagulation Care Providers     Provider Role Specialty Phone number    Griffin Fried MD Referring Cardiology [phone]          Clinic Interview:  Patient Findings     Negatives:   Signs/symptoms of thrombosis, Signs/symptoms of bleeding,   Laboratory test error suspected, Change in health, Change in alcohol use,   Change in activity, Upcoming invasive procedure, Emergency department   visit, Upcoming dental procedure, Missed doses, Extra doses, Change in   medications, Change in diet/appetite, Hospital admission, Bruising, Other   complaints      Clinical Outcomes     Negatives:   Major bleeding event, Thromboembolic event,   Anticoagulation-related hospital admission, Anticoagulation-related ED   visit, Anticoagulation-related fatality        INR History:  Anticoagulation Monitoring 1/27/2020 1/31/2020 2/7/2020   INR 3.2 3.3 3.9   INR Date 1/27/2020 1/31/2020 2/7/2020   INR Goal 3.0-3.5 3.0-3.5 3.0-3.5   Trend Same Same Same   Last Week Total 47.5 mg 45 mg 45 mg   Next Week Total 47.5 mg 45 mg 45 mg   Sun - 7.5 mg 7.5 mg   Mon 7.5 mg (1/27) 5 mg 5 mg   Tue 7.5 mg 7.5 mg 7.5 mg   Wed 5 mg 5 mg 5 mg   Thu 7.5  mg 7.5 mg 7.5 mg   Fri - 5 mg 5 mg   Sat - 7.5 mg 7.5 mg   Visit Report - - -   Some recent data might be hidden       Plan:  1. INR is Supratherapeutic today- see above in Anticoagulation Summary.  Will instruct Francisco Sequeira to Continue their warfarin regimen (due for lower 5 mg dose today) - see above in Anticoagulation Summary.  2. Follow up in 1 week  3. Patient desires warfarin refills.  4. Verbal and written information provided. Patient expresses understanding and has no further questions at this time.    Vasquez Niño, [facility]

## 2020-02-14 ENCOUNTER — OFFICE VISIT (OUTPATIENT)
Dept: FAMILY MEDICINE CLINIC | Facility: CLINIC | Age: 85
End: 2020-02-14

## 2020-02-14 ENCOUNTER — READMISSION MANAGEMENT (OUTPATIENT)
Dept: CALL CENTER | Facility: HOSPITAL | Age: 85
End: 2020-02-14

## 2020-02-14 ENCOUNTER — ANTICOAGULATION VISIT (OUTPATIENT)
Dept: PHARMACY | Facility: HOSPITAL | Age: 85
End: 2020-02-14

## 2020-02-14 VITALS
DIASTOLIC BLOOD PRESSURE: 70 MMHG | HEART RATE: 67 BPM | TEMPERATURE: 97.6 F | HEIGHT: 72 IN | WEIGHT: 182.8 LBS | SYSTOLIC BLOOD PRESSURE: 128 MMHG | BODY MASS INDEX: 24.76 KG/M2 | OXYGEN SATURATION: 100 %

## 2020-02-14 DIAGNOSIS — E78.5 HYPERLIPIDEMIA, UNSPECIFIED HYPERLIPIDEMIA TYPE: ICD-10-CM

## 2020-02-14 DIAGNOSIS — Z95.2 HX OF AORTIC VALVE REPLACEMENT, MECHANICAL: ICD-10-CM

## 2020-02-14 DIAGNOSIS — E11.649 UNCONTROLLED TYPE 2 DIABETES MELLITUS WITH HYPOGLYCEMIA WITHOUT COMA (HCC): Primary | ICD-10-CM

## 2020-02-14 DIAGNOSIS — I10 ESSENTIAL HYPERTENSION: ICD-10-CM

## 2020-02-14 DIAGNOSIS — K21.9 GASTROESOPHAGEAL REFLUX DISEASE WITHOUT ESOPHAGITIS: ICD-10-CM

## 2020-02-14 DIAGNOSIS — I63.9 ACUTE CEREBRAL INFARCTION (HCC): ICD-10-CM

## 2020-02-14 LAB
HBA1C MFR BLD: 7.2 %
INR PPP: 3.9 (ref 0.91–1.09)
PROTHROMBIN TIME: 46.8 SECONDS (ref 10–13.8)

## 2020-02-14 PROCEDURE — 83036 HEMOGLOBIN GLYCOSYLATED A1C: CPT | Performed by: NURSE PRACTITIONER

## 2020-02-14 PROCEDURE — 36416 COLLJ CAPILLARY BLOOD SPEC: CPT

## 2020-02-14 PROCEDURE — G0463 HOSPITAL OUTPT CLINIC VISIT: HCPCS

## 2020-02-14 PROCEDURE — 99214 OFFICE O/P EST MOD 30 MIN: CPT | Performed by: NURSE PRACTITIONER

## 2020-02-14 PROCEDURE — 85610 PROTHROMBIN TIME: CPT

## 2020-02-14 RX ORDER — ATORVASTATIN CALCIUM 40 MG/1
40 TABLET, FILM COATED ORAL DAILY
Qty: 90 TABLET | Refills: 3 | Status: SHIPPED | OUTPATIENT
Start: 2020-02-14 | End: 2021-02-04

## 2020-02-14 NOTE — OUTREACH NOTE
Stroke Week 4 Survey      Responses   Facility patient discharged from?  [facility]   Does the patient have one of the following disease processes/diagnoses(primary or secondary)?  Stroke (TIA)   Week 4 attempt successful?  Yes   Call start time  1818   Call end time  1821   Discharge diagnosis  Acute cerebral infarction    Meds reviewed with patient/caregiver?  Yes   Is the patient taking all medications as directed (includes completed medication regime)?  Yes   Has the patient kept scheduled appointments due by today?  Yes   Is the patient still receiving Home Health Services?  Yes   Does the patient require any assistance with activities of daily living such as eating, bathing, dressing, walking, etc.?  No   Does the patient have any residual symptoms from stroke/TIA?  Yes [Pt reports he still has trouble walking. ]   Does the patient understand the diet ordered at discharge?  Yes   What is the patient's perception of their health status since discharge?  Improving   Nursing interventions  Nurse provided patient education   Is the patient able to teach back FAST for Stroke?  Yes   Is the patient/caregiver able to teach back the risk factors for a stroke?  High blood pressure-goal below 120/80, High Cholesterol, Smoking   Week 4 Call Completed?  Yes   Would the patient like one additional call?  No   Graduated  Yes   Did the patient feel the follow up calls were helpful during their recovery period?  Yes   Was the number of calls appropriate?  Yes          Idalia Murdock RN

## 2020-02-14 NOTE — PROGRESS NOTES
Anticoagulation Clinic Progress Note    Anticoagulation Summary  As of 2/14/2020    INR goal:   3.0-3.5   TTR:   73.7 % (1.4 y)   INR used for dosing:   3.9! (2/14/2020)   Warfarin maintenance plan:   7.5 mg every Tue, Thu, Sat; 5 mg all other days   Weekly warfarin total:   42.5 mg   Plan last modified:   Abner Rodriges III, [facility] (2/14/2020)   Next INR check:   2/21/2020   Priority:   Maintenance   Target end date:   Indefinite    Indications    Atrial fibrillation (CMS/HCC) [I48.91]  Hx of aortic valve replacement  mechanical [Z95.2] [Z95.2]             Anticoagulation Episode Summary     INR check location:       Preferred lab:       Send INR reminders to:    [facility]    Comments:   New INR goal 3 - 3.5 (see 1/2020 hospitalization for CVA)      Anticoagulation Care Providers     Provider Role Specialty Phone number    Griffin Fried MD Referring Cardiology [phone]          Clinic Interview:  Patient Findings     Negatives:   Signs/symptoms of thrombosis, Signs/symptoms of bleeding,   Laboratory test error suspected, Change in health, Change in alcohol use,   Change in activity, Upcoming invasive procedure, Emergency department   visit, Upcoming dental procedure, Missed doses, Extra doses, Change in   medications, Change in diet/appetite, Hospital admission, Bruising, Other   complaints      Clinical Outcomes     Negatives:   Major bleeding event, Thromboembolic event,   Anticoagulation-related hospital admission, Anticoagulation-related ED   visit, Anticoagulation-related fatality        INR History:  Anticoagulation Monitoring 1/31/2020 2/7/2020 2/14/2020   INR 3.3 3.9 3.9   INR Date 1/31/2020 2/7/2020 2/14/2020   INR Goal 3.0-3.5 3.0-3.5 3.0-3.5   Trend Same Same Down   Last Week Total 45 mg 45 mg 45 mg   Next Week Total 45 mg 45 mg 42.5 mg   Sun 7.5 mg 7.5 mg 5 mg (2/16)   Mon 5 mg 5 mg 5 mg   Tue 7.5 mg 7.5 mg 7.5 mg   Wed 5 mg 5 mg 5 mg   Thu 7.5 mg 7.5 mg 7.5 mg   Fri 5 mg 5  mg 5 mg   Sat 7.5 mg 7.5 mg 7.5 mg   Visit Report - - -   Some recent data might be hidden       Plan:  1. INR is Supratherapeutic today- see above in Anticoagulation Summary.  Will instruct Francisco Sequeira to decrease their warfarin regimen- see above in Anticoagulation Summary.  2. Follow up in 1 week  3. Patient declines warfarin refills.  4. Verbal and written information provided. Patient expresses understanding and has no further questions at this time.    Abner Rodriges III, [facility]

## 2020-02-14 NOTE — PROGRESS NOTES
"Subjective   Francisco Sequeira is a 84 y.o. male.     History of Present Illness    Since the last visit, he has overall felt well.  He has Essential Hypertension and well controlled on current medication, DMII well controlled on medication and will continue regimen, GERD controlled on PPI Rx and Hyperlipidemia with goals met with current Rx.  he has been compliant with current medications have reviewed them.  The patient denies medication side effects.  Will refill medications. /70 (BP Location: Right arm, Patient Position: Sitting)   Pulse 67   Temp 97.6 °F (36.4 °C)   Ht 182 cm (71.65\")   Wt 82.9 kg (182 lb 12.8 oz)   SpO2 100%   BMI 25.03 kg/m²     Results for orders placed or performed in visit on 02/14/20   POC Glycosylated Hemoglobin (Hb A1C)   Result Value Ref Range    Hemoglobin A1C 7.2 %     He recently went to the emergency room for being unable to walk found him to have a stroke he does not have any speech residual slight residual with walking but he is having therapy come to his home twice a week and is starting next week he will be going to outpatient therapy at [facility].  Patient states that he is feeling quite well he is not had any issues since going to the hospital and he did not want a follow-up with me for hospital follow-up.    The following portions of the patient's history were reviewed and updated as appropriate: allergies, current medications, past social history and problem list.    Review of Systems   Constitutional: Negative for fever.   Respiratory: Negative for cough and shortness of breath.    Cardiovascular: Negative for chest pain.   Gastrointestinal: Negative for abdominal pain.   Neurological: Negative for dizziness.       Objective   /70 (BP Location: Right arm, Patient Position: Sitting)   Pulse 67   Temp 97.6 °F (36.4 °C)   Ht 182 cm (71.65\")   Wt 82.9 kg (182 lb 12.8 oz)   SpO2 100%   BMI 25.03 kg/m²   Physical Exam   Constitutional: He is oriented to " person, place, and time. Vital signs are normal. He appears well-developed and well-nourished. No distress.   HENT:   Head: Normocephalic.   Cardiovascular: Normal rate, regular rhythm and normal heart sounds.   Pulmonary/Chest: Effort normal and breath sounds normal.   Neurological: He is alert and oriented to person, place, and time. Gait normal.   Psychiatric: He has a normal mood and affect. His behavior is normal. Judgment and thought content normal.   Vitals reviewed.      Assessment/Plan      Diagnosis Plan   1. Uncontrolled type 2 diabetes mellitus with hypoglycemia without coma (CMS/HCC)  POC Glycosylated Hemoglobin (Hb A1C)   2. Essential hypertension     3. Gastroesophageal reflux disease without esophagitis     4. Hyperlipidemia, unspecified hyperlipidemia type  atorvastatin (LIPITOR) 40 MG tablet   5. Acute cerebral infarction (CMS/HCC)       Continue same with medications return to the office in 3 months  LIZA Trinh  2/14/2020

## 2020-02-17 ENCOUNTER — TELEPHONE (OUTPATIENT)
Dept: NEUROLOGY | Facility: CLINIC | Age: 85
End: 2020-02-17

## 2020-02-17 NOTE — TELEPHONE ENCOUNTER
Stroke Phone Call  Spoke with the patient's wife Gladys  · Admission Date:  1/13/20  · Discharge Date:  1/21/20  · Discharge Destination:  · Meds reviewed with patient/caregiver?    [x]Yes [] No   o Antiplatelet: ASA   - Understands purpose     [x]  Yes     []  No     - Understands how to take      [x]  Yes     []  No    o Cholesterol Reducing:  Lipitor  - Understands purpose     [x]  Yes     []  No    - Understands how to take      [x]  Yes     []  No   o Anticoagulant:  Coumadin  - Understands purpose     [x]  Yes     []  No    - Understands how to take      [x]  Yes     []  No     · Is the patient taking all medication as directed?   [x]  Yes  []  No  · Discussed personal risk factors   [x]  Yes []  No    o Physical Inactivity  - Engaged in physical activity [x]  Yes []  No   o High cholesterol   - Review desired LDL goal <70  o Atherosclerosis  - Plaque inside the arteries, “hardening of the arteries”  o Aortic valve  • Discussed signs and symptoms of stroke and when patient to call 911?   [x]  Yes []  No  o Sudden weakness or numbness of the face, arm, or leg especially on one side of the body  o Sudden confusion, trouble speaking or understanding  o Sudden trouble seeing in one or both eyes   o Sudden trouble walking, dizziness, loss of balance or coordination  o Sudden severe headaches with no known cause    Notified Patient that if any of these symptoms occur to call 911  · Does the patient have any new signs or symptoms of a stroke?   [x]  Yes     []  No  • Does the patient have increasing stiffness in arms, hands, or legs?    []  Yes     [x]  No   Is this interfering with activities of daily living?   []  Yes     [x]  No  · Does the patient have an appointment with PCP?  [x]  Yes     []  No  · Does the patient have 3 month Stroke Clinic appointment?  [x]  Yes     []  No  · Is the patient currently in therapy, outpatient, or home health?  [x]  Yes     []  No     Needs a referral?       []  Yes     [x]   No

## 2020-02-18 ENCOUNTER — TELEPHONE (OUTPATIENT)
Dept: CARDIOLOGY | Facility: CLINIC | Age: 85
End: 2020-02-18

## 2020-02-18 ENCOUNTER — TELEPHONE (OUTPATIENT)
Dept: FAMILY MEDICINE CLINIC | Facility: CLINIC | Age: 85
End: 2020-02-18

## 2020-02-18 DIAGNOSIS — I63.9 CEREBROVASCULAR ACCIDENT (CVA), UNSPECIFIED MECHANISM (HCC): Primary | ICD-10-CM

## 2020-02-18 NOTE — TELEPHONE ENCOUNTER
A physical therapist from [facility] called requesting an order for out patient physical therapy to be faxed to   [phone].

## 2020-02-18 NOTE — TELEPHONE ENCOUNTER
Narda, Physical Therapist with [facility], left msg saying pt is progressing after a stroke and they want to send him to the [facility] rehab on the 4th floor.  They need you to put in an out pt order.    [phone]-Narda      Thanks,  Sravanthi

## 2020-02-18 NOTE — TELEPHONE ENCOUNTER
I have never done this before unsure what they are talking about would defer to the primary caregiver

## 2020-02-21 ENCOUNTER — ANTICOAGULATION VISIT (OUTPATIENT)
Dept: PHARMACY | Facility: HOSPITAL | Age: 85
End: 2020-02-21

## 2020-02-21 DIAGNOSIS — Z95.2 HX OF AORTIC VALVE REPLACEMENT, MECHANICAL: ICD-10-CM

## 2020-02-21 LAB
INR PPP: 3 (ref 0.91–1.09)
PROTHROMBIN TIME: 35.4 SECONDS (ref 10–13.8)

## 2020-02-21 PROCEDURE — 36416 COLLJ CAPILLARY BLOOD SPEC: CPT

## 2020-02-21 PROCEDURE — 85610 PROTHROMBIN TIME: CPT

## 2020-02-21 NOTE — PROGRESS NOTES
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] [Z95.2]             Anticoagulation Episode Summary     INR check location:       Preferred lab:       Send INR reminders to:    [facility]    Comments:   New INR goal 3 - 3.5 (see 1/2020 hospitalization for CVA)      Anticoagulation Care Providers     Provider Role Specialty Phone number    Griffin Fried MD Referring Cardiology [phone]          Clinic Interview:  Patient Findings     Negatives:   Signs/symptoms of thrombosis, Signs/symptoms of bleeding,   Laboratory test error suspected, Change in health, Change in alcohol use,   Change in activity, Upcoming invasive procedure, Emergency department   visit, Upcoming dental procedure, Missed doses, Extra doses, Change in   medications, Change in diet/appetite, Hospital admission, Bruising, Other   complaints      Clinical Outcomes     Negatives:   Major bleeding event, Thromboembolic event,   Anticoagulation-related hospital admission, Anticoagulation-related ED   visit, Anticoagulation-related fatality        INR History:  Anticoagulation Monitoring 2/7/2020 2/14/2020 2/21/2020   INR 3.9 3.9 3.0   INR Date 2/7/2020 2/14/2020 2/21/2020   INR Goal 3.0-3.5 3.0-3.5 3.0-3.5   Trend Same Down Same   Last Week Total 45 mg 45 mg 42.5 mg   Next Week Total 45 mg 42.5 mg 42.5 mg   Sun 7.5 mg 5 mg (2/16) 5 mg   Mon 5 mg 5 mg 5 mg   Tue 7.5 mg 7.5 mg 7.5 mg   Wed 5 mg 5 mg 5  mg   Thu 7.5 mg 7.5 mg 7.5 mg   Fri 5 mg 5 mg 5 mg   Sat 7.5 mg 7.5 mg 7.5 mg   Visit Report - - -   Some recent data might be hidden       Plan:  1. INR is therapeutic today- see above in Anticoagulation Summary.   Will instruct Francisco Sequeira to continue their warfarin regimen- see above in Anticoagulation Summary.  2. Follow up in 2 weeks.  3. Patient declines warfarin refills.  4. Verbal and written information provided. Patient expresses understanding and has no further questions at this time.    Princess Robin

## 2020-02-24 ENCOUNTER — HOSPITAL ENCOUNTER (OUTPATIENT)
Dept: PHYSICAL THERAPY | Facility: HOSPITAL | Age: 85
Setting detail: THERAPIES SERIES
Discharge: HOME | End: 2020-02-24

## 2020-02-24 DIAGNOSIS — I63.9 CEREBROVASCULAR ACCIDENT (CVA), UNSPECIFIED MECHANISM (HCC): Primary | ICD-10-CM

## 2020-02-24 DIAGNOSIS — R26.89 FUNCTIONAL GAIT ABNORMALITY: ICD-10-CM

## 2020-02-24 PROCEDURE — 97162 PT EVAL MOD COMPLEX 30 MIN: CPT

## 2020-02-25 NOTE — THERAPY EVALUATION
.Outpatient Physical Therapy Neuro Initial Evaluation  [facility]     Patient Name: Francisco Sequeira  : 1935  MRN: [id number]  Today's Date: 2020      Visit Date: 2020    Patient Active Problem List   Diagnosis   • Atrial fibrillation (CMS/HCC)   • Hypertension   • Atopic rhinitis   • Uncontrolled type 2 diabetes mellitus (CMS/HCC)   • Gastroesophageal reflux disease   • Hyperlipidemia   • Renal insufficiency   • Low testosterone   • Special screening for malignant neoplasm of prostate   • Hx of aortic valve replacement, mechanical [Z95.2]   • Screening for iron deficiency anemia   • Stroke-like symptom   • Kidney carcinoma (CMS/HCC)   • CKD (chronic kidney disease) stage 3, GFR 30-59 ml/min (CMS/HCC)   • BYRON (acute kidney injury) (CMS/HCC)   • Acute cerebral infarction (CMS/HCC)        Past Medical History:   Diagnosis Date   • Abnormal electrocardiogram    • Allergic rhinitis    • Aortic valve insufficiency    • Ascending aortic aneurysm (CMS/HCC)    • Atrial fibrillation (CMS/HCC)    • Bacteremia    • Calcific aortic stenosis of bicuspid valve    • Cardiac arrest (CMS/HCC)    • Cardiomyopathy (CMS/HCC)    • Contact dermatitis due to poison ivy    • Diabetes mellitus (CMS/HCC)    • Elbow fracture    • Esophageal reflux    • GERD (gastroesophageal reflux disease)    • Head injury    • Hyperglycemia    • Hyperlipidemia    • Hypertension    • Kidney carcinoma (CMS/HCC)    • Leg swelling    • Localized swelling of both lower legs    • Nasal congestion    • Nasal drainage    • Nonischemic cardiomyopathy (CMS/HCC)    • Palpitations    • Pedal edema    • Pleural effusion on left    • Renal insufficiency syndrome    • Renal oncocytoma    • Seasonal allergic reaction    • Sinusitis    • Syncope    • Type 2 diabetes mellitus (CMS/HCC)     uncontrolled   • Vision changes    • Visual field defect         Past Surgical History:   Procedure Laterality Date   • AORTIC VALVE REPAIR/REPLACEMENT     •  "ASCENDING AORTIC ANEURYSM REPAIR W/ MECHANICAL AORTIC VALVE REPLACEMENT     • NEPHRECTOMY     • OTHER SURGICAL HISTORY      elbow surgery   • PROSTATE SURGERY     • THORACENTESIS Left     diagnostic         Visit Dx:     ICD-10-CM ICD-9-CM   1. Cerebrovascular accident (CVA), unspecified mechanism (CMS/[facility]) I63.9 434.91   2. Functional gait abnormality R26.89 781.2       Patient History     Row Name 02/24/20 1430             History    Chief Complaint  Balance Problems;Difficulty Walking;Falls/history of falls;Muscle weakness  -LB      Date Current Problem(s) Began  01/13/20  -LB      Brief Description of Current Complaint  Pt developed trouble with walking and weakness in his LE's (L > R) on 1/13/20. Pt admitted to [facility] and diagnosed with an Acute Cerebral Infarction. Pt was discharged home on 1/21/20 and received  PT. Pt reporting ,\"I had a couple of little strokes.\" (per wife one on each side but left side affected more). Pt reports he has to  my foot so my toe doesn't drag.\"  -LB      Previous treatment for THIS PROBLEM  Other (comment)  PT   -LB      Patient/Caregiver Goals  -- \"Walk like I always walked. Get out and mow the grass.\"   -LB      Occupation/sports/leisure activities  yardwork   -LB      Patient seeing anyone else for problem(s)?  no   -LB      Related/Recent Hospitalizations  Yes  -LB      Date of Hospitalization  01/13/20  -LB      Location (Hospital Name)  [facility]   -         Pain     Pain at Present  0  -LB         Fall Risk Assessment    Any falls in the past year:  Yes  -LB      Number of falls reported in the last 12 months  at least 3-4 per wife  Wife reports she feels he may have had another fall while she was at work but he didn't tell her.  -LB      Factors that contributed to the fall:  Other (comment) left toe drag   -LB      Previous Functional Level  Ambulation;ADLs  -LB      Level of Assistance:  independent without a device, yardwork  -LB      Level of Assistance:  " "independent with ADL's  -LB         Services    Prior Rehab/Home Health Experiences  Yes  -LB      Are you currently receiving Home Health services  No  -LB         Daily Activities    Primary Language  English  -LB      Patient is concerned about/has problems with  Walking;Performing home management (household chores, shopping, care of dependents)  -LB      Barriers to learning  None  -LB      Pt Participated in POC and Goals  Yes  -LB         Safety    Are you being hurt, hit, or frightened by anyone at home or in your life?  No  -LB      Are you being neglected by a caregiver  No  -LB        User Key  (r) = Recorded By, (t) = Taken By, (c) = Cosigned By    Initials Name Provider Type    LB Sandi Reza, PT Physical Therapist              PT Neuro     Row Name 02/24/20 1430             Cognition    Overall Cognitive Status  Unable to assess to fully assess, pt followed all commands   -LB      Arousal/Alertness  Appropriate responses to stimuli  -LB      Memory  Appears intact  -LB      Orientation Level  Oriented to situation;Oriented to person;Oriented to time  -LB      Safety Judgment  Decreased awareness of need for safety per wife pt does not consistently use his walker  -LB      Deficits  Decreased awareness of deficits  -LB      Comments  Pt placed his walker to the side when going to sit on mat at beginning of eval. Pt reporting ,\"Sometimes I can walker without the  cane or walker.\"   -LB         Sensation    Additional Comments  pt denied any numbness or tingling   -LB         Posture/Observations    Alignment Options  Forward head;Thoracic kyphosis;Lumbar lordosis  -LB      Forward Head  Moderate  -LB      Thoracic Kyphosis  Moderate  -LB      Lumbar lordosis  Decreased  -LB      Observations  -- Pt's wife showed PT picture of a bruise on his buttocks.  -LB      Posture/Observations Comments  Pt arrived to PT with his rolling walker and his wife.   -LB         General ROM    GENERAL ROM COMMENTS  AROM " WNL bilateral UE's and LE's   -LB         MMT (Manual Muscle Testing)    Rt Upper Ext  Rt Shoulder WNL;Rt Elbow/Forearm WNL;Rt Wrist WNL  -LB      Lt Upper Ext  Lt Shoulder WNL;Lt Elbow/Forearm WNL;Lt Wrist WNL  -LB      Rt Lower Ext  Rt Hip WNL;Rt Knee WNL;Rt Ankle WNL  -LB      Lt Lower Ext  Lt Hip Flexion;Lt Hip Extension;Lt Hip ABduction;Lt Knee Extension;Lt Knee Flexion;Lt Ankle Plantarflexion;Lt Ankle Dorsiflexion;Lt Ankle Subtalar Eversion;Lt Ankle Subtalar Inversion  -LB         MMT Left Lower Ext    Lt Hip Flexion MMT, Gross Movement  (4/5) good  -LB      Lt Hip Extension MMT, Gross Movement  -- maximal resistance in sidelying  -LB      Lt Hip ABduction MMT, Gross Movement  (4/5) good  -LB      Lt Knee Extension MMT, Gross Movement  (5/5) normal  -LB      Lt Knee Flexion MMT, Gross Movement  other (see comments) maximal resistance in sitting  -LB      Lt Ankle Plantarflexion MMT, Gross Movement  -- in sitting maximal resistance  -LB      Lt Ankle Dorsiflexion MMT, Gross Movement  (5/5) normal  -LB      Lt Ankle Subtalar Inversion MMT, Gross Movement  (5/5) normal  -LB      Lt Ankle Subtalar Eversion MMT, Gross Movement  (4/5) good  -LB         Bed Mobility Assessment/Treatment    Rolling Left [place] (Bed Mobility)  independent  -LB      Rolling Right [place] (Bed Mobility)  independent  -LB      Supine-Sit [place] (Bed Mobility)  independent  -LB      Sit-Supine [place] (Bed Mobility)  independent  -LB      Comment (Bed Mobility)  per wife pt sleeps in a recliner majority of the time and uses a urinal at night for safety   -LB         Transfers    Sit-Stand [place] (Transfers)  contact guard;minimum assist (75% patient effort) from an armless chair w/ LOB backwards  -LB      Stand-Sit [place] (Transfers)  contact guard to an armless chair, lacks control doesn't use UE's on chair  -LB      Transfers, Sit-Stand-Sit, Assist Device  rolling walker  -LB      Transfer, Safety Issues   sequencing ability decreased;weight-shifting ability decreased;balance decreased during turns;loses balance backward;other (see comments) places wx to the side, decreased wt shift over LE's   -LB      Transfer, Impairments  impaired balance;strength decreased;postural control impaired  -LB      Comment (Transfers)  see DI  When going to sit on the mat pt placed his walker to the side prior to sitting.   -LB         Gait/Stairs Assessment/Training    [place] Level (Gait)  supervision  -LB      Assistive Device (Gait)  walker, front-wheeled  -LB      Distance in Feet (Gait)  100'  -LB      Pattern (Gait)  step-to;step-through not consistently stepping through  -LB      Deviations/Abnormal Patterns (Gait)  gait speed decreased;stride length decreased  -LB      Bilateral Gait Deviations  heel strike decreased;forward flexed posture  -LB      Left Sided Gait Deviations  foot drop/toe drag;knee hyperextension  -LB      [place] Level (Stairs)  contact guard;minimum assist (75% patient effort)  -LB      Handrail Location (Stairs)  right side (ascending)  -LB      Number of Steps (Stairs)  4  -LB      Ascending Technique (Stairs)  step-to-step  -LB      Descending Technique (Stairs)  step-to-step  -LB      Stairs, Safety Issues  sequencing ability decreased;weight-shifting ability decreased  -LB      Stairs, Impairments  strength decreased;impaired balance;coordination impaired  -LB      Comment (Gait/Stairs)  see SONAM  6/24   see TUG   -LB         Balance Skills Training    Balance Comments  see DI 29/56   -LB        User Key  (r) = Recorded By, (t) = Taken By, (c) = Cosigned By    Initials Name Provider Type    Sandi Fink, PT Physical Therapist                  Therapy Education  Education Details: STRONGLY advised pt to use his rolling walker with ambulation at all times and to position the walker in front of him when going to sit on the bed or in a chair.   Given: Fall prevention and home safety,  Mobility training  How Provided: Verbal, Demonstration  Provided to: Patient, Caregiver  Level of Understanding: Teach back education performed, Verbalized, Demonstrated    PT OP Goals     Row Name 02/24/20 1430          PT Short Term Goals    STG Date to Achieve  03/23/20  -LB     STG 1  Pt to come to stand from an armless chair to his walker conditional independent.   -LB     STG 2  Pt to perform sit to stand and stand to sit with increased weight shift over his LE's.   -LB     STG 3  Pt to ambulate with decreased increased left toe clearance.   -LB     STG 4  Pt to improve score on the SEVILLA to 36/56 to improve balance and reduce risk of falls.  -LB     STG 5  Pt to improve score on the Dynamic Gait Index to 12/24 to improve balance and reduce risk of falls.   -LB     STG 6  Pt to ascend and descend 4 steps with one rail step to step with CGA with correct sequencing.   -LB     STG 7  Pt to perform floor transfers with UE support with minimal of 1.   -LB     STG 8  Pt to ambulate ~ 250' with rolling walker with left heelstrike and stride length.   -LB        Long Term Goals    LTG Date to Achieve  05/06/20  -LB     LTG 1  Pt to come to stand from an armless chair with least restricitve device conditional independent.   -LB     LTG 2  Pt to complete the TUG in < 15 seconds while ambulating with least restrictive device.   -LB     LTG 3  Pt to increase strength left hip flexors and abductors to 5/5.   -LB     LTG 4  Pt to improve score on the SEVILLA to 41/56 to improve balance and reduce risk of falls.  -LB     LTG 5  Pt to improve score on the Dynamic Gait Index to 17/24 to improve balance and reduce risk of falls.  -LB     LTG 6  Pt to ascend and descend 3 steps with least restrictive device with SBA.   -LB     LTG 7  Pt to perform floor transfers with UE support with SBA to conditional independent.   -LB     LTG 8  Pt to ambulate >500' with least restricitve device on level surfaces conditional independent.   -LB         Time Calculation    PT Goal Re-Cert Due Date  03/23/20  -LB       User Key  (r) = Recorded By, (t) = Taken By, (c) = Cosigned By    Initials Name Provider Type    Sandi iFnk, PT Physical Therapist          PT Assessment/Plan     Row Name 02/24/20 5131          PT Assessment    Functional Limitations  Decreased safety during functional activities;Impaired gait;Limitation in home management;Performance in self-care ADL  -LB     Impairments  Balance;Gait;Muscle strength;Impaired postural alignment  -LB     Assessment Comments  Pt diagnosed with a CVA, onset 1/13/20. MRI revealed some small strokes possibly embolic. Per pt and wife pt had 2 small strokes with left side more affected. Pt was discharged on 1/21/20 and received HH PT. Pt arrived to PT ambualting with a rolling walker with his wife present. Pt has had several falls. Pt's wife reports he doesn't use his walker all the timeas he has been instructed by HH PT.  Pt demonstrated poor safety judgement with positioning of his walker when going to sit on the mat. Pt demonstrated decreased strength in the left hip flexors and abductors. Pt has multiple gait deviations including left toe drag. Pt demonstrated significant deficit with transfers, gait and balance as inidcated by the SEVILLA and Dynamic Gait Index. The outcome measures place pt at high risk for falls. Pt would greatly benfit from skilled PT with emphasis on transfers, gait, stairs, balance, L LE strengthening and safety awareness.     -LB     Please refer to paper survey for additional self-reported information  Yes  -LB     Rehab Potential  Good  -LB     Patient/caregiver participated in establishment of treatment plan and goals  Yes  -LB     Patient would benefit from skilled therapy intervention  Yes  -LB        PT Plan    PT Frequency  2x/week  -LB     Predicted Duration of Therapy Intervention (Therapy Eval)  8-10 weeks  -LB     Planned CPT's?  PT EVAL MOD COMPLELITY: 31594;PT NEUROMUSC  RE-EDUCATION EA 15 MIN: 27682;PT THER ACT EA 15 MIN: 08264  -LB       User Key  (r) = Recorded By, (t) = Taken By, (c) = Cosigned By    Initials Name Provider Type    Sandi Fink, PT Physical Therapist                             Outcome Measure Options: Harp Balance, Dynamic Gait Index, Timed Up and Go (TUG)  Harp Balance Scale  Sitting to Standing: able to stand using hands after several tries  Standing Unsupported: able to stand safely for 2 minutes  Sitting with Back Unsupported but Feet Supported on Floor or on Stool: able to sit safely and securely for 2 minutes  Standing to Sitting: controls descent by using hands  Transfers: needs one person to assist  Standing Unsupported with Eyes Closed: able to stand 10 seconds with supervision  Standing Unsupported with Feet Together: able to place feet together independently but unable to hold for 30 seconds  Reaching Forward with Outstretched Arm While Standing: can reach forward 12 cm (5 inches)   Object From the Floor From a Standing Position: able to  object but needs supervision  Turning to Look Behind Over Left and Right Shoulders While Standing: needs supervision when turning  Turn 360 Degrees: needs close supervision or verbal cuing  Place Alternate Foot on Step or Stool While Standing Unsupported: able to complete > 2 steps needs minimal assist  Standing Unsupported with One Foot in Front: needs help to step but can hold 15 seconds  Standing on One Leg: unable to try of needs assist to prevent fall  Harp Total Score: 29  Harp Comments: fall risk   Dynamic Gait Index (DGI)  Gait Level Surface: Moderate impairment: walks 20’, slow speed, abnormal gait patters, evidence for imbalance  Change in Gait Speed: Moderate impairment: Makes only minor adjustments to walking speed, or accomplishes a change in speed with significant gait deviations, or changes speed but loses balance but is able to recover and continue walking.  Gait with Horizontal  Head Turns: Moderate impairment: Performs head turns with moderate change in gait velocity, slows down, staggers, but recovers, can continue to walk.  Gait with Vertical Head Turns: Moderate impairment: Performs head turns with moderate change in gait velocity, slows down,staggers, but recovers, can continue to walk.  Gait and Pivot Turn: Moderate impairment: Turns slowly, requires verbal cueing, requires several small steps to catch balance following turn and stop.  Step Over Obstacle: Severe impairment: Cannot perform without assistance.  Step Around Obstacles: Moderate impairment: Is able to clear cones but must significantly slow speed to accomplish task, or requires verbal cueing.  Steps: Severe impairment: Cannot do safely.(strong verbal cues for technique and definite CGA )  Dynamic Gait Index Score: 6  Dynamic Gait Index Comments: high risk for falls   Timed Up and Go (TUG)  TUG Test 1: 22 seconds  TUG Test 2: 25 seconds  Timed Up and Go Comments: with rolling walker     Time Calculation:   Start Time: 1430  Stop Time: 1515  Time Calculation (min): 45 min  Total Timed Code Minutes- PT: 45 minute(s)   Therapy Charges for Today     Code Description Service Date Service Provider Modifiers Qty    [id number]  PT EVAL MOD COMPLEXITY 4 2/24/2020 Sandi Reza, PT GP 1          PT G-Codes  Outcome Measure Options: Harp Balance, Dynamic Gait Index, Timed Up and Go (TUG)  Harp Total Score: 29  TUG Test 1: 22 seconds  TUG Test 2: 25 seconds         Sandi Reza, PT  2/24/2020

## 2020-02-27 ENCOUNTER — HOSPITAL ENCOUNTER (OUTPATIENT)
Dept: PHYSICAL THERAPY | Facility: HOSPITAL | Age: 85
Setting detail: THERAPIES SERIES
Discharge: HOME | End: 2020-02-27

## 2020-02-27 DIAGNOSIS — I63.9 CEREBROVASCULAR ACCIDENT (CVA), UNSPECIFIED MECHANISM (HCC): Primary | ICD-10-CM

## 2020-02-27 DIAGNOSIS — R26.89 FUNCTIONAL GAIT ABNORMALITY: ICD-10-CM

## 2020-02-27 PROCEDURE — 97112 NEUROMUSCULAR REEDUCATION: CPT | Performed by: PHYSICAL THERAPIST

## 2020-02-27 PROCEDURE — 97530 THERAPEUTIC ACTIVITIES: CPT | Performed by: PHYSICAL THERAPIST

## 2020-02-27 PROCEDURE — 97110 THERAPEUTIC EXERCISES: CPT | Performed by: PHYSICAL THERAPIST

## 2020-02-27 NOTE — THERAPY TREATMENT NOTE
Outpatient Physical Therapy Neuro Treatment Note  [facility]     Patient Name: Francisco Sequeira  : 1935  MRN: [id number]  Today's Date: 2020      Visit Date: 2020    Visit Dx:    ICD-10-CM ICD-9-CM   1. Cerebrovascular accident (CVA), unspecified mechanism (CMS/HCC) I63.9 434.91   2. Functional gait abnormality R26.89 781.2       Patient Active Problem List   Diagnosis   • Atrial fibrillation (CMS/HCC)   • Hypertension   • Atopic rhinitis   • Uncontrolled type 2 diabetes mellitus (CMS/HCC)   • Gastroesophageal reflux disease   • Hyperlipidemia   • Renal insufficiency   • Low testosterone   • Special screening for malignant neoplasm of prostate   • Hx of aortic valve replacement, mechanical [Z95.2]   • Screening for iron deficiency anemia   • Stroke-like symptom   • Kidney carcinoma (CMS/HCC)   • CKD (chronic kidney disease) stage 3, GFR 30-59 ml/min (CMS/HCC)   • BYRON (acute kidney injury) (CMS/HCC)   • Acute cerebral infarction (CMS/HCC)           PT Neuro     Row Name 20 0927             Subjective Comments    Subjective Comments  pt reports no new problems since eval 20  -JK         Subjective Pain    Able to rate subjective pain?  yes  -JK      Pre-Treatment Pain Level  0  -JK      Post-Treatment Pain Level  0  -JK         Posture/Observations    Alignment Options  Forward head;Thoracic kyphosis;Lumbar lordosis  -JK      Forward Head  Moderate  -JK      Lumbar lordosis  Decreased  -JK      Posture/Observations Comments  Pt arrived with his RW And wife  -JK         Transfers    Sit-Stand [place] (Transfers)  contact guard;minimum assist (75% patient effort) from armless chair with LOB in 2 attempts  -JK      Stand-Sit [place] (Transfers)  contact guard VCs to use hands and control sit/no plop  -JK      Transfers, Sit-Stand-Sit, Assist Device  rolling walker  -JK      Transfer, Safety Issues  sequencing ability decreased;weight-shifting ability decreased;balance  "decreased during turns;loses balance backward;other (see comments)  -JK      Transfer, Impairments  impaired balance;strength decreased;postural control impaired  -JK      Comment (Transfers)  pt comes to stand with hips fully back in the seat so he can push posteriorly against the chair for support to come to stand; sits with little use of LES and \"plops\" into seat  -JK         Gait/Stairs Assessment/Training    [place] Level (Gait)  supervision  -JK      Assistive Device (Gait)  walker, front-wheeled  -JK      Distance in Feet (Gait)  100' x 2  -JK      Pattern (Gait)  step-through  -JK      Deviations/Abnormal Patterns (Gait)  gait speed decreased;stride length decreased  -JK      Bilateral Gait Deviations  heel strike decreased;forward flexed posture  -JK      Left Sided Gait Deviations  foot drop/toe drag;knee hyperextension  -JK      Comment (Gait/Stairs)  VCs throughout gait to keep head up; RW raised 2 notches to allow pt better ability to keep upright posture  -JK         Balance Skills Training    Standing-Level of Assistance  Contact guard  -JK      Static Standing Balance Support  No upper extremity supported  -JK      Standing-Balance Activities  -- head turns R/L/up/dn; raising gym ball up/dn/r/l  -JK        User Key  (r) = Recorded By, (t) = Taken By, (c) = Cosigned By    Initials Name Provider Type    Janie Baker, PT Physical Therapist                  PT Assessment/Plan     Row Name 02/27/20 4018          PT Assessment    Functional Limitations  Decreased safety during functional activities;Impaired gait;Limitation in home management;Performance in self-care ADL  -JK     Impairments  Balance;Gait;Muscle strength;Impaired postural alignment  -JK     Assessment Comments  Pt encouraged to walk at home at all times with RW and practice holding his head up (vs looking at floor) while walking at home. Pt also encouraged to slow down during stand to sit and sit to  order to use LEs more " "during tsf; pt has LE weakness and \"plops\" into chair without using LEs to control descent and comes to stand by pushing posteriorly against chair frame for extra stability. Pt would benefit from continued skilled PT to address functional deficits.  -JK       User Key  (r) = Recorded By, (t) = Taken By, (c) = Cosigned By    Initials Name Provider Type    Janie Baker PT Physical Therapist             OP Exercises     Row Name 02/27/20 0930             Subjective Comments    Subjective Comments  pt reports no new problems since eval 2/24/20  -JK         Subjective Pain    Able to rate subjective pain?  yes  -JK      Pre-Treatment Pain Level  0  -JK      Post-Treatment Pain Level  0  -JK         Exercise 1    Exercise Name 1  standing ex: hip abd; MIP, mini squat  -JK      Cueing 1  Verbal;Tactile  -JK      Sets 1  3  -JK      Reps 1  5  -JK      Additional Comments  cues to keep head up and trunk stable  -JK         Exercise 2    Exercise Name 2  sit to stand; stand to sit from armed and armless chairs with emphasis on using LEs to control each  -JK      Cueing 2  Verbal;Tactile  -JK      Reps 2  8 from each armed and armless chair  -JK        User Key  (r) = Recorded By, (t) = Taken By, (c) = Cosigned By    Initials Name Provider Type    Janie Baker, PT Physical Therapist                          Therapy Education  Education Details: REviewed body mechanics during standing ex and gait. Reviewed SEVILLA and DGI scores and pts need to use RW for gait at all times now due to strong risk of falls due to his test scores  Given: Fall prevention and home safety, Mobility training  Program: New, Reinforced  How Provided: Verbal, Demonstration  Provided to: Patient, Caregiver  Level of Understanding: Teach back education performed, Verbalized, Demonstrated              Time Calculation:   Start Time: 0845  Stop Time: 0930  Time Calculation (min): 45 min  Total Timed Code Minutes- PT: 45 minute(s)   Therapy Charges " for Today     Code Description Service Date Service Provider Modifiers Qty    [id number]  PT NEUROMUSC RE EDUCATION EA 15 MIN 2/27/2020 Janie Ash, PT GP 1    [id number]  PT THERAPEUTIC ACT EA 15 MIN 2/27/2020 Janie Ash, PT GP 1    [id number]  PT THER PROC EA 15 MIN 2/27/2020 Janie Ash, PT GP 1                    Janie Ash, PT  2/27/2020

## 2020-02-28 ENCOUNTER — TELEPHONE (OUTPATIENT)
Dept: NEUROLOGY | Facility: CLINIC | Age: 85
End: 2020-02-28

## 2020-03-02 ENCOUNTER — HOSPITAL ENCOUNTER (OUTPATIENT)
Dept: PHYSICAL THERAPY | Facility: HOSPITAL | Age: 85
Setting detail: THERAPIES SERIES
Discharge: HOME | End: 2020-03-02

## 2020-03-02 DIAGNOSIS — R26.89 FUNCTIONAL GAIT ABNORMALITY: ICD-10-CM

## 2020-03-02 DIAGNOSIS — I63.9 CEREBROVASCULAR ACCIDENT (CVA), UNSPECIFIED MECHANISM (HCC): Primary | ICD-10-CM

## 2020-03-02 PROCEDURE — 97112 NEUROMUSCULAR REEDUCATION: CPT

## 2020-03-02 PROCEDURE — 97110 THERAPEUTIC EXERCISES: CPT

## 2020-03-02 PROCEDURE — 97530 THERAPEUTIC ACTIVITIES: CPT

## 2020-03-02 NOTE — THERAPY TREATMENT NOTE
"    Outpatient Physical Therapy Neuro Treatment Note  [facility]     Patient Name: Francisco Sequeira  : 1935  MRN: [id number]  Today's Date: 3/2/2020      Visit Date: 2020    Visit Dx:    ICD-10-CM ICD-9-CM   1. Cerebrovascular accident (CVA), unspecified mechanism (CMS/HCC) I63.9 434.91   2. Functional gait abnormality R26.89 781.2       Patient Active Problem List   Diagnosis   • Atrial fibrillation (CMS/HCC)   • Hypertension   • Atopic rhinitis   • Uncontrolled type 2 diabetes mellitus (CMS/HCC)   • Gastroesophageal reflux disease   • Hyperlipidemia   • Renal insufficiency   • Low testosterone   • Special screening for malignant neoplasm of prostate   • Hx of aortic valve replacement, mechanical [Z95.2]   • Screening for iron deficiency anemia   • Stroke-like symptom   • Kidney carcinoma (CMS/HCC)   • CKD (chronic kidney disease) stage 3, GFR 30-59 ml/min (CMS/HCC)   • BYRON (acute kidney injury) (CMS/HCC)   • Acute cerebral infarction (CMS/HCC)           PT Neuro     Row Name 20 1430             Subjective Comments    Subjective Comments  Pt and wife deny any falls. Pt reports, \"I use the tray in the kitchen to make my sandwich and carry it over to the table.\"    -LB         Subjective Pain    Able to rate subjective pain?  yes  -LB      Pre-Treatment Pain Level  0  -LB      Post-Treatment Pain Level  0  -LB         Cognition    Overall Cognitive Status  WFL  -LB      Memory  Appears intact  -LB      Orientation Level  Oriented to situation;Oriented to person;Oriented to time  -LB      Safety Judgment  Decreased awareness of need for safety  -LB      Comments  Pt and wife reporting that he has been using the walker. Pt described using the walker and tray appropriately in the kitchen area.   -LB         Posture/Observations    Alignment Options  Forward head;Thoracic kyphosis;Lumbar lordosis  -LB      Forward Head  Moderate  -LB      Lumbar lordosis  Decreased  -LB      Posture/Observations " Comments  Pt arrived with his RWx with a tray and his wife  -LB         Transfers    Sit-Stand [place] (Transfers)  stand by assist;contact guard  -LB      Stand-Sit [place] (Transfers)  stand by assist;contact guard  -LB      Transfers, Sit-Stand-Sit, Assist Device  rolling walker  -LB      Transfer, Safety Issues  sequencing ability decreased;weight-shifting ability decreased;balance decreased during turns  -LB      Transfer, Impairments  impaired balance;strength decreased;postural control impaired  -LB      Comment (Transfers)  Pt coming to stand with good weight shift over his LE's.   -LB         Gait/Stairs Assessment/Training    [place] Level (Gait)  stand by assist;contact guard  -LB      Assistive Device (Gait)  walker, front-wheeled;danny-bars  -LB      Distance in Feet (Gait)  150' with RWx with tray and 80' x 2 with danny-bars   -LB      Pattern (Gait)  step-through  -LB      Deviations/Abnormal Patterns (Gait)  gait speed decreased;stride length decreased  -LB      Bilateral Gait Deviations  heel strike decreased;forward flexed posture  -LB      Left Sided Gait Deviations  foot drop/toe drag slight foot drag, no hyperextension noted   -LB      [place] Level (Stairs)  stand by assist  -LB      Assistive Device (Stairs)  walker, front-wheeled  -LB      Number of Steps (Stairs)  curb   -LB         Balance Skills Training    Standing-Level of Assistance  Contact guard;Minimum assistance  -LB      Static Standing Balance Support  No upper extremity supported;parallel bars  -LB      Standing-Balance Activities  Weight Shift R-L;Feet together;Semi-tandum;Compliant surfaces;Standing on 1/2 roll  -LB        User Key  (r) = Recorded By, (t) = Taken By, (c) = Cosigned By    Initials Name Provider Type    Sandi Fink, PT Physical Therapist                  PT Assessment/Plan     Row Name 03/02/20 1701          PT Assessment    Assessment Comments  Pt demonstrated increased weight shift over  "Le's with transfers. Pt does require cues for proper hand placement. Pt arrived to PT with his rolling walker with a tray with good quality of gait.   -LB       User Key  (r) = Recorded By, (t) = Taken By, (c) = Cosigned By    Initials Name Provider Type    Sandi Fink PT Physical Therapist             OP Exercises     Row Name 03/02/20 1430             Subjective Comments    Subjective Comments  Pt and wife deny any falls. Pt reports, \"I use the tray in the kitchen to make my sandwich and carry it over to the table.\"    -LB         Subjective Pain    Able to rate subjective pain?  yes  -LB      Pre-Treatment Pain Level  0  -LB      Post-Treatment Pain Level  0  -LB        User Key  (r) = Recorded By, (t) = Taken By, (c) = Cosigned By    Initials Name Provider Type    Sandi Fink PT Physical Therapist                          Therapy Education  Education Details: Emphasized the importance of hand placement with coming to stand and going to sit.   Given: Mobility training  How Provided: Verbal  Provided to: Patient, Caregiver  Level of Understanding: Teach back education performed, Verbalized, Demonstrated              Time Calculation:   Start Time: 1430  Stop Time: 1514  Time Calculation (min): 44 min  Total Timed Code Minutes- PT: 44 minute(s)   Therapy Charges for Today     Code Description Service Date Service Provider Modifiers Qty    [id number]  PT NEUROMUSC RE EDUCATION EA 15 MIN 3/2/2020 Sandi Reza PT GP 1    [id number]  PT THER PROC EA 15 MIN 3/2/2020 Sandi Reza PT GP 1    [id number]  PT THERAPEUTIC ACT EA 15 MIN 3/2/2020 Sandi Reza PT GP 1                    Sandi Reza PT  3/2/2020     "

## 2020-03-05 ENCOUNTER — HOSPITAL ENCOUNTER (OUTPATIENT)
Dept: PHYSICAL THERAPY | Facility: HOSPITAL | Age: 85
Setting detail: THERAPIES SERIES
Discharge: HOME | End: 2020-03-05

## 2020-03-05 ENCOUNTER — ANTICOAGULATION VISIT (OUTPATIENT)
Dept: PHARMACY | Facility: HOSPITAL | Age: 85
End: 2020-03-05

## 2020-03-05 DIAGNOSIS — Z95.2 HX OF AORTIC VALVE REPLACEMENT, MECHANICAL: ICD-10-CM

## 2020-03-05 DIAGNOSIS — R26.89 FUNCTIONAL GAIT ABNORMALITY: ICD-10-CM

## 2020-03-05 DIAGNOSIS — I63.9 CEREBROVASCULAR ACCIDENT (CVA), UNSPECIFIED MECHANISM (HCC): Primary | ICD-10-CM

## 2020-03-05 LAB
INR PPP: 2.9 (ref 0.91–1.09)
PROTHROMBIN TIME: 35.4 SECONDS (ref 10–13.8)

## 2020-03-05 PROCEDURE — 85610 PROTHROMBIN TIME: CPT

## 2020-03-05 PROCEDURE — G0463 HOSPITAL OUTPT CLINIC VISIT: HCPCS

## 2020-03-05 PROCEDURE — 97112 NEUROMUSCULAR REEDUCATION: CPT

## 2020-03-05 PROCEDURE — 36416 COLLJ CAPILLARY BLOOD SPEC: CPT

## 2020-03-05 PROCEDURE — 97110 THERAPEUTIC EXERCISES: CPT

## 2020-03-05 PROCEDURE — 97530 THERAPEUTIC ACTIVITIES: CPT

## 2020-03-05 NOTE — PROGRESS NOTES
Anticoagulation Clinic Progress Note    Anticoagulation Summary  As of 3/5/2020    INR goal:   3.0-3.5   TTR:   71.6 % (1.4 y)   INR used for dosin.9! (3/5/2020)   Warfarin maintenance plan:   5 mg every Mon, Wed, Fri; 7.5 mg all other days   Weekly warfarin total:   45 mg   Plan last modified:   Vasquez Niño RPH (3/5/2020)   Next INR check:   3/19/2020   Priority:   Maintenance   Target end date:   Indefinite    Indications    Atrial fibrillation (CMS/HCC) [I48.91]  Hx of aortic valve replacement  mechanical [Z95.2] [Z95.2]             Anticoagulation Episode Summary     INR check location:       Preferred lab:       Send INR reminders to:   [facility]    Comments:   New INR goal 3 - 3.5 (see 2020 hospitalization for CVA)      Anticoagulation Care Providers     Provider Role Specialty Phone number    Griffin Fried MD Referring Cardiology [phone]          Clinic Interview:  Patient Findings     Negatives:   Signs/symptoms of thrombosis, Signs/symptoms of bleeding,   Laboratory test error suspected, Change in health, Change in alcohol use,   Change in activity, Upcoming invasive procedure, Emergency department   visit, Upcoming dental procedure, Missed doses, Extra doses, Change in   medications, Change in diet/appetite, Hospital admission, Bruising, Other   complaints      Clinical Outcomes     Negatives:   Major bleeding event, Thromboembolic event,   Anticoagulation-related hospital admission, Anticoagulation-related ED   visit, Anticoagulation-related fatality        INR History:  Anticoagulation Monitoring 2020 2020 3/5/2020   INR 3.9 3.0 2.9   INR Date 2020 2020 3/5/2020   INR Goal 3.0-3.5 3.0-3.5 3.0-3.5   Trend Down Same Up   Last Week Total 45 mg 42.5 mg 42.5 mg   Next Week Total 42.5 mg 42.5 mg 45 mg   Sun 5 mg () 5 mg 7.5 mg   Mon 5 mg 5 mg 5 mg   Tue 7.5 mg 7.5 mg 7.5 mg   Wed 5 mg 5 mg 5 mg   Thu 7.5 mg 7.5 mg 7.5 mg   Fri 5 mg 5 mg 5 mg    Sat 7.5 mg 7.5 mg 7.5 mg   Visit Report - - -   Some recent data might be hidden       Plan:  1. INR is Subtherapeutic today- see above in Anticoagulation Summary.  Will instruct Francisco Sequeira to Increase their warfarin regimen- see above in Anticoagulation Summary.  2. Follow up in 2 weeks  3. Patient declines warfarin refills.  4. Verbal and written information provided. Patient expresses understanding and has no further questions at this time.    Vasquez Niño RP

## 2020-03-05 NOTE — THERAPY TREATMENT NOTE
"    Outpatient Physical Therapy Neuro Treatment Note  [facility]     Patient Name: Francisco Sequeira  : 1935  MRN: [id number]  Today's Date: 3/5/2020      Visit Date: 2020    Visit Dx:    ICD-10-CM ICD-9-CM   1. Cerebrovascular accident (CVA), unspecified mechanism (CMS/HCC) I63.9 434.91   2. Functional gait abnormality R26.89 781.2       Patient Active Problem List   Diagnosis   • Atrial fibrillation (CMS/HCC)   • Hypertension   • Atopic rhinitis   • Uncontrolled type 2 diabetes mellitus (CMS/HCC)   • Gastroesophageal reflux disease   • Hyperlipidemia   • Renal insufficiency   • Low testosterone   • Special screening for malignant neoplasm of prostate   • Hx of aortic valve replacement, mechanical [Z95.2]   • Screening for iron deficiency anemia   • Stroke-like symptom   • Kidney carcinoma (CMS/HCC)   • CKD (chronic kidney disease) stage 3, GFR 30-59 ml/min (CMS/HCC)   • BYRON (acute kidney injury) (CMS/HCC)   • Acute cerebral infarction (CMS/HCC)           PT Neuro     Row Name 20 0853             Subjective Comments    Subjective Comments  When pt arrived PT ask if any new and pt reported ,\"No.\" Pt denied falls and wife agreed. Pt reports he has no trouble getting up from the chairs at restuarants.   -LB         Subjective Pain    Able to rate subjective pain?  yes  -LB      Pre-Treatment Pain Level  0  -LB      Post-Treatment Pain Level  0  -LB         Cognition    Overall Cognitive Status  WFL  -LB      Memory  Appears intact  -LB      Orientation Level  Oriented to situation;Oriented to person;Oriented to time  -LB      Safety Judgment  Decreased awareness of need for safety improving with use of walker at home per wife   -LB      Deficits  Decreased awareness of deficits  -LB      Comments  Pt's wife reports he doesn't move the walker to the side as much.\"  -LB         Posture/Observations    Alignment Options  Forward head;Thoracic kyphosis;Lumbar lordosis  -LB      Forward Head  " Moderate  -LB      Lumbar lordosis  Decreased  -LB      Posture/Observations Comments  Pt arrived to PT ambulating with hisrolling walker and his wife present.   -LB         Transfers    Sit-Stand [place] (Transfers)  stand by assist;verbal cues  -LB      Stand-Sit [place] (Transfers)  stand by assist;verbal cues  -LB      Transfers, Sit-Stand-Sit, Assist Device  rolling walker;straight cane  -LB      Transfer, Safety Issues  sequencing ability decreased;weight-shifting ability decreased chair moved 1 x when pt didn't wt shift forward   -LB      Transfer, Impairments  impaired balance;strength decreased;postural control impaired  -LB      Comment (Transfers)  With verbal cues pt wt shifted over his LE's when coming to stand from a low mat.   -LB         Gait/Stairs Assessment/Training    [place] Level (Gait)  contact guard;verbal cues  -LB      Assistive Device (Gait)  cane, straight  -LB      Distance in Feet (Gait)  90' x 3 with single point   -LB      Pattern (Gait)  step-to;step-through  -LB      Deviations/Abnormal Patterns (Gait)  gait speed decreased;stride length decreased  -LB      Bilateral Gait Deviations  heel strike decreased;forward flexed posture  -LB      Left Sided Gait Deviations  foot drop/toe drag;weight shift ability decreased foot drag 3 x , pt maintained balance  -LB      [place] Level (Stairs)  minimum assist (75% patient effort);verbal cues HHA of wife   -LB      Assistive Device (Stairs)  cane, straight  -LB      Number of Steps (Stairs)  curb   -LB      Stairs, Safety Issues  sequencing ability decreased;weight-shifting ability decreased  -LB      Stairs, Impairments  strength decreased;impaired balance;coordination impaired  -LB      Comment (Gait/Stairs)  Pt ascended and descended 4 steps with left handrail and cane step to step.   -LB         Balance Skills Training    Standing-Level of Assistance  Contact guard;Minimum assistance  -LB      Static Standing Balance  "Support  No upper extremity supported PT standing in front   -LB      Standing-Balance Activities  Feet together;Semi-tandum  -LB      [place]  30 seconds with eyes opened and closed 30 seconds with CGA  -LB      Sharpened [place]  unable to achieve position   -LB        User Key  (r) = Recorded By, (t) = Taken By, (c) = Cosigned By    Initials Name Provider Type    Sandi Fink PT Physical Therapist                  PT Assessment/Plan     Row Name 03/05/20 1001          PT Assessment    Assessment Comments  Pt and wife reporting he is using the walker more at home. Pt required strong CGA with ambulation with a straight cane.   -LB       User Key  (r) = Recorded By, (t) = Taken By, (c) = Cosigned By    Initials Name Provider Type    Sandi Fink PT Physical Therapist             OP Exercises     Row Name 03/05/20 0895             Subjective Comments    Subjective Comments  When pt arrived PT ask if any new and pt reported ,\"No.\" Pt denied falls and wife agreed. Pt reports he has no trouble getting up from the chairs at restuarants.   -LB         Subjective Pain    Able to rate subjective pain?  yes  -LB      Pre-Treatment Pain Level  0  -LB      Post-Treatment Pain Level  0  -LB         Exercise 3    Exercise Name 3  PERCH sitting with hips flexed ~ 45 degrees - hip flexion each LE without UE support   -LB      Sets 3  2  -LB      Reps 3  10  -LB      Additional Comments  Pt required minimal tactile cues to keep trunk from rotating  -LB         Exercise 4    Exercise Name 4  Scooting laterally each direction 5' with emphasis on wt shift over his LE's   -LB      Additional Comments  verbal cues required to fully weight shift over his LE's   -LB        User Key  (r) = Recorded By, (t) = Taken By, (c) = Cosigned By    Initials Name Provider Type    Sandi Fink, PT Physical Therapist                          Therapy Education  Education Details: STRONGLY advised pt to continue ambulating with his " walker at all times and not to advance to the cane. Curb with cane and HHA of wife.   Given: Mobility training, Fall prevention and home safety  How Provided: Verbal, Demonstration  Provided to: Patient, Caregiver  Level of Understanding: Teach back education performed, Verbalized              Time Calculation:   Start Time: 0845  Stop Time: 0930  Time Calculation (min): 45 min  Total Timed Code Minutes- PT: 45 minute(s)   Therapy Charges for Today     Code Description Service Date Service Provider Modifiers Qty    [id number]  PT NEUROMUSC RE EDUCATION EA 15 MIN 3/5/2020 Sandi Reza, PT GP 1    [id number]  PT THER PROC EA 15 MIN 3/5/2020 Sandi Reza, PT GP 1    [id number]  PT THERAPEUTIC ACT EA 15 MIN 3/5/2020 Sandi Reza, PT GP 1                    Sandi Reza, PT  3/5/2020

## 2020-03-09 ENCOUNTER — APPOINTMENT (OUTPATIENT)
Dept: PHYSICAL THERAPY | Facility: HOSPITAL | Age: 85
End: 2020-03-09

## 2020-03-12 ENCOUNTER — APPOINTMENT (OUTPATIENT)
Dept: CARDIOLOGY | Facility: HOSPITAL | Age: 85
End: 2020-03-12

## 2020-03-12 ENCOUNTER — HOSPITAL ENCOUNTER (OUTPATIENT)
Dept: PHYSICAL THERAPY | Facility: HOSPITAL | Age: 85
Setting detail: THERAPIES SERIES
Discharge: HOME | End: 2020-03-12

## 2020-03-12 ENCOUNTER — HOSPITAL ENCOUNTER (EMERGENCY)
Facility: HOSPITAL | Age: 85
Discharge: HOME | End: 2020-03-12
Attending: EMERGENCY MEDICINE | Admitting: EMERGENCY MEDICINE

## 2020-03-12 VITALS
SYSTOLIC BLOOD PRESSURE: 131 MMHG | BODY MASS INDEX: 24.38 KG/M2 | HEART RATE: 76 BPM | TEMPERATURE: 96.7 F | WEIGHT: 180 LBS | RESPIRATION RATE: 16 BRPM | OXYGEN SATURATION: 96 % | HEIGHT: 72 IN | DIASTOLIC BLOOD PRESSURE: 85 MMHG

## 2020-03-12 DIAGNOSIS — R60.0 LEG EDEMA, LEFT: Primary | ICD-10-CM

## 2020-03-12 DIAGNOSIS — I63.9 CEREBROVASCULAR ACCIDENT (CVA), UNSPECIFIED MECHANISM (HCC): Primary | ICD-10-CM

## 2020-03-12 DIAGNOSIS — R26.89 FUNCTIONAL GAIT ABNORMALITY: ICD-10-CM

## 2020-03-12 LAB
ALBUMIN SERPL-MCNC: 4 G/DL (ref 3.5–5.2)
ALBUMIN/GLOB SERPL: 1.4 G/DL
ALP SERPL-CCNC: 96 U/L (ref 39–117)
ALT SERPL W P-5'-P-CCNC: 25 U/L (ref 1–41)
ANION GAP SERPL CALCULATED.3IONS-SCNC: 13.2 MMOL/L (ref 5–15)
AST SERPL-CCNC: 26 U/L (ref 1–40)
BASOPHILS # BLD AUTO: 0.04 10*3/MM3 (ref 0–0.2)
BASOPHILS NFR BLD AUTO: 0.7 % (ref 0–1.5)
BH CV LOWER VASCULAR LEFT COMMON FEMORAL AUGMENT: NORMAL
BH CV LOWER VASCULAR LEFT COMMON FEMORAL COMPETENT: NORMAL
BH CV LOWER VASCULAR LEFT COMMON FEMORAL COMPRESS: NORMAL
BH CV LOWER VASCULAR LEFT COMMON FEMORAL PHASIC: NORMAL
BH CV LOWER VASCULAR LEFT COMMON FEMORAL SPONT: NORMAL
BH CV LOWER VASCULAR LEFT DISTAL FEMORAL COMPRESS: NORMAL
BH CV LOWER VASCULAR LEFT GASTRONEMIUS COMPRESS: NORMAL
BH CV LOWER VASCULAR LEFT GREATER SAPH AK COMPRESS: NORMAL
BH CV LOWER VASCULAR LEFT GREATER SAPH BK COMPRESS: NORMAL
BH CV LOWER VASCULAR LEFT MID FEMORAL AUGMENT: NORMAL
BH CV LOWER VASCULAR LEFT MID FEMORAL COMPETENT: NORMAL
BH CV LOWER VASCULAR LEFT MID FEMORAL COMPRESS: NORMAL
BH CV LOWER VASCULAR LEFT MID FEMORAL PHASIC: NORMAL
BH CV LOWER VASCULAR LEFT MID FEMORAL SPONT: NORMAL
BH CV LOWER VASCULAR LEFT PERONEAL COMPRESS: NORMAL
BH CV LOWER VASCULAR LEFT POPLITEAL AUGMENT: NORMAL
BH CV LOWER VASCULAR LEFT POPLITEAL COMPETENT: NORMAL
BH CV LOWER VASCULAR LEFT POPLITEAL COMPRESS: NORMAL
BH CV LOWER VASCULAR LEFT POPLITEAL PHASIC: NORMAL
BH CV LOWER VASCULAR LEFT POPLITEAL SPONT: NORMAL
BH CV LOWER VASCULAR LEFT POSTERIOR TIBIAL COMPRESS: NORMAL
BH CV LOWER VASCULAR LEFT PROFUNDA FEMORAL COMPRESS: NORMAL
BH CV LOWER VASCULAR LEFT PROXIMAL FEMORAL COMPRESS: NORMAL
BH CV LOWER VASCULAR LEFT SAPHENOFEMORAL JUNCTION COMPRESS: NORMAL
BH CV LOWER VASCULAR RIGHT COMMON FEMORAL AUGMENT: NORMAL
BH CV LOWER VASCULAR RIGHT COMMON FEMORAL COMPETENT: NORMAL
BH CV LOWER VASCULAR RIGHT COMMON FEMORAL COMPRESS: NORMAL
BH CV LOWER VASCULAR RIGHT COMMON FEMORAL PHASIC: NORMAL
BH CV LOWER VASCULAR RIGHT COMMON FEMORAL SPONT: NORMAL
BILIRUB SERPL-MCNC: 0.5 MG/DL (ref 0.2–1.2)
BUN BLD-MCNC: 35 MG/DL (ref 8–23)
BUN/CREAT SERPL: 21.1 (ref 7–25)
CALCIUM SPEC-SCNC: 8.8 MG/DL (ref 8.6–10.5)
CHLORIDE SERPL-SCNC: 104 MMOL/L (ref 98–107)
CO2 SERPL-SCNC: 25.8 MMOL/L (ref 22–29)
CREAT BLD-MCNC: 1.66 MG/DL (ref 0.76–1.27)
DEPRECATED RDW RBC AUTO: 45.2 FL (ref 37–54)
EOSINOPHIL # BLD AUTO: 0.19 10*3/MM3 (ref 0–0.4)
EOSINOPHIL NFR BLD AUTO: 3.2 % (ref 0.3–6.2)
ERYTHROCYTE [DISTWIDTH] IN BLOOD BY AUTOMATED COUNT: 14.4 % (ref 12.3–15.4)
GFR SERPL CREATININE-BSD FRML MDRD: 40 ML/MIN/1.73
GLOBULIN UR ELPH-MCNC: 2.9 GM/DL
GLUCOSE BLD-MCNC: 58 MG/DL (ref 65–99)
HCT VFR BLD AUTO: 44.1 % (ref 37.5–51)
HGB BLD-MCNC: 14.4 G/DL (ref 13–17.7)
INR PPP: 2.43 (ref 0.9–1.1)
LYMPHOCYTES # BLD AUTO: 1.54 10*3/MM3 (ref 0.7–3.1)
LYMPHOCYTES NFR BLD AUTO: 25.9 % (ref 19.6–45.3)
MCH RBC QN AUTO: 28.1 PG (ref 26.6–33)
MCHC RBC AUTO-ENTMCNC: 32.7 G/DL (ref 31.5–35.7)
MCV RBC AUTO: 86.1 FL (ref 79–97)
MONOCYTES # BLD AUTO: 0.48 10*3/MM3 (ref 0.1–0.9)
MONOCYTES NFR BLD AUTO: 8.1 % (ref 5–12)
NEUTROPHILS # BLD AUTO: 3.66 10*3/MM3 (ref 1.7–7)
NEUTROPHILS NFR BLD AUTO: 61.6 % (ref 42.7–76)
PLATELET # BLD AUTO: 122 10*3/MM3 (ref 140–450)
PMV BLD AUTO: 12.9 FL (ref 6–12)
POTASSIUM BLD-SCNC: 4.2 MMOL/L (ref 3.5–5.2)
PROT SERPL-MCNC: 6.9 G/DL (ref 6–8.5)
PROTHROMBIN TIME: 26.1 SECONDS (ref 11.7–14.2)
RBC # BLD AUTO: 5.12 10*6/MM3 (ref 4.14–5.8)
SODIUM BLD-SCNC: 143 MMOL/L (ref 136–145)
WBC NRBC COR # BLD: 5.94 10*3/MM3 (ref 3.4–10.8)

## 2020-03-12 PROCEDURE — 93971 EXTREMITY STUDY: CPT

## 2020-03-12 PROCEDURE — 85025 COMPLETE CBC W/AUTO DIFF WBC: CPT | Performed by: EMERGENCY MEDICINE

## 2020-03-12 PROCEDURE — 85610 PROTHROMBIN TIME: CPT | Performed by: EMERGENCY MEDICINE

## 2020-03-12 PROCEDURE — 97110 THERAPEUTIC EXERCISES: CPT

## 2020-03-12 PROCEDURE — 99283 EMERGENCY DEPT VISIT LOW MDM: CPT

## 2020-03-12 PROCEDURE — 97112 NEUROMUSCULAR REEDUCATION: CPT

## 2020-03-12 PROCEDURE — 80053 COMPREHEN METABOLIC PANEL: CPT | Performed by: EMERGENCY MEDICINE

## 2020-03-12 PROCEDURE — 97530 THERAPEUTIC ACTIVITIES: CPT

## 2020-03-12 NOTE — ED PROVIDER NOTES
EMERGENCY DEPARTMENT ENCOUNTER    CHIEF COMPLAINT  Chief Complaint: leg swelling  History given by: pt  History limited by: none  Room Number: 23/23  PMD: Rubin Hinkle APRN      HPI:  Pt is a 84 y.o. male who presents complaining of unchanged left lower leg swelling that has been going on for a couple of months. Pt states he had a stroke in January this year and is currently in PT. Pt states his PT was concerned for a possible blood clot. Pt states he is on Coumadin. Family states his INR was either 2.7 or 2.9 last week. Pt denies any difficulty walking. He denies any CP, SOA, fever, or chills.     PAST MEDICAL HISTORY  Active Ambulatory Problems     Diagnosis Date Noted   • Atrial fibrillation (CMS/[facility])    • Hypertension    • Atopic rhinitis 03/21/2016   • Uncontrolled type 2 diabetes mellitus (CMS/HCC) 03/21/2016   • Gastroesophageal reflux disease 03/21/2016   • Hyperlipidemia 03/21/2016   • Renal insufficiency 03/31/2017   • Low testosterone 04/03/2017   • Special screening for malignant neoplasm of prostate 10/30/2017   • Hx of aortic valve replacement, mechanical [Z95.2] 09/19/2018   • Screening for iron deficiency anemia 11/01/2018   • Stroke-like symptom 01/13/2020   • Kidney carcinoma (CMS/HCC) 01/13/2020   • CKD (chronic kidney disease) stage 3, GFR 30-59 ml/min (CMS/HCC) 01/13/2020   • BYRON (acute kidney injury) (CMS/HCC) 01/14/2020   • Acute cerebral infarction (CMS/[facility]) 01/14/2020     Resolved Ambulatory Problems     Diagnosis Date Noted   • Cardiomyopathy (CMS/[facility])    • Type 2 diabetes mellitus with complication (CMS/[facility]) 03/21/2016   • Poison ivy 09/06/2016   • Low testosterone 04/07/2017   • Sinusitis 05/01/2018   • Hx of mitral valve replacement with mechanical valve [Z95.2] 09/19/2018     Past Medical History:   Diagnosis Date   • Abnormal electrocardiogram    • Allergic rhinitis    • Aortic valve insufficiency    • Ascending aortic aneurysm (CMS/HCC)    • Bacteremia    • Calcific aortic stenosis  of bicuspid valve    • Cardiac arrest (CMS/HCC)    • Contact dermatitis due to poison ivy    • Diabetes mellitus (CMS/HCC)    • Elbow fracture    • Esophageal reflux    • GERD (gastroesophageal reflux disease)    • Head injury    • Hyperglycemia    • Leg swelling    • Localized swelling of both lower legs    • Nasal congestion    • Nasal drainage    • Nonischemic cardiomyopathy (CMS/HCC)    • Palpitations    • Pedal edema    • Pleural effusion on left    • Renal insufficiency syndrome    • Renal oncocytoma    • Seasonal allergic reaction    • Syncope    • Type 2 diabetes mellitus (CMS/HCC)    • Vision changes    • Visual field defect        PAST SURGICAL HISTORY  Past Surgical History:   Procedure Laterality Date   • AORTIC VALVE REPAIR/REPLACEMENT     • ASCENDING AORTIC ANEURYSM REPAIR W/ MECHANICAL AORTIC VALVE REPLACEMENT     • NEPHRECTOMY     • OTHER SURGICAL HISTORY      elbow surgery   • PROSTATE SURGERY     • THORACENTESIS Left     diagnostic       FAMILY HISTORY  Family History   Problem Relation Age of Onset   • Cancer Mother         colon   • Cancer Brother         colon       SOCIAL HISTORY  Social History     Socioeconomic History   • Marital status:      Spouse name: Not on file   • Number of children: Not on file   • Years of education: Not on file   • Highest education level: Not on file   Tobacco Use   • Smoking status: Former Smoker   • Smokeless tobacco: Former User   • Tobacco comment: caffeine use   Substance and Sexual Activity   • Alcohol use: Yes     Comment: occasional   • Drug use: No   • Sexual activity: Defer       ALLERGIES  Other; Penicillins; and Percocet  [oxycodone-acetaminophen]    REVIEW OF SYSTEMS  Review of Systems   Constitutional: Negative.  Negative for fever.   HENT: Negative.  Negative for sore throat.    Eyes: Negative.    Respiratory: Negative.  Negative for cough.    Cardiovascular: Positive for leg swelling (left lower leg swelling). Negative for chest pain.    Gastrointestinal: Negative.    Genitourinary: Negative.  Negative for dysuria.   Musculoskeletal: Negative.  Negative for back pain.   Skin: Negative.  Negative for rash.   Neurological: Negative.  Negative for headaches.   All other systems reviewed and are negative.      PHYSICAL EXAM  ED Triage Vitals   Temp Heart Rate Resp BP SpO2   03/12/20 1033 03/12/20 1033 03/12/20 1033 03/12/20 1056 03/12/20 1033   96.7 °F (35.9 °C) 76 16 137/76 99 %      Temp src Heart Rate Source Patient Position BP Location FiO2 (%)   03/12/20 1033 03/12/20 1033 03/12/20 1102 03/12/20 1102 --   Tympanic Monitor Lying Right arm        Physical Exam   Constitutional: No distress.   HENT:   Head: Normocephalic and atraumatic.   Eyes: Conjunctivae are normal.   Neck: Normal range of motion.   Cardiovascular: Normal rate, regular rhythm and intact distal pulses.   Pulmonary/Chest: Effort normal and breath sounds normal. No respiratory distress. He has no wheezes. He has no rales.   Abdominal: Soft. Bowel sounds are normal. He exhibits no distension. There is no tenderness.   Musculoskeletal: He exhibits no tenderness.   Edema from just below the left knee down to the ankle. Nontender. Good pulses. Good sensation. No signs of cellulits.      Neurological: He is alert.   Skin: No rash noted.   Nursing note and vitals reviewed.      LAB RESULTS  Lab Results (last 24 hours)     Procedure Component Value Units Date/Time    CBC & Differential [[id number]] Collected:  03/12/20 1150    Specimen:  Blood Updated:  03/12/20 1230    Narrative:       The following orders were created for panel order CBC & Differential.  Procedure                               Abnormality         Status                     ---------                               -----------         ------                     CBC Auto Differential[[id number]]        Abnormal            Final result                 Please view results for these tests on the individual orders.     Comprehensive Metabolic Panel [[id number]]  (Abnormal) Collected:  03/12/20 1150    Specimen:  Blood Updated:  03/12/20 1227     Glucose 58 mg/dL      BUN 35 mg/dL      Creatinine 1.66 mg/dL      Sodium 143 mmol/L      Potassium 4.2 mmol/L      Chloride 104 mmol/L      CO2 25.8 mmol/L      Calcium 8.8 mg/dL      Total Protein 6.9 g/dL      Albumin 4.00 g/dL      ALT (SGPT) 25 U/L      AST (SGOT) 26 U/L      Alkaline Phosphatase 96 U/L      Total Bilirubin 0.5 mg/dL      eGFR Non African Amer 40 mL/min/1.73      Globulin 2.9 gm/dL      A/G Ratio 1.4 g/dL      BUN/Creatinine Ratio 21.1     Anion Gap 13.2 mmol/L     Narrative:       GFR Normal >60  Chronic Kidney Disease <60  Kidney Failure <15      Protime-INR [[id number]]  (Abnormal) Collected:  03/12/20 1150    Specimen:  Blood Updated:  03/12/20 1230     Protime 26.1 Seconds      INR 2.43    CBC Auto Differential [[id number]]  (Abnormal) Collected:  03/12/20 1150    Specimen:  Blood Updated:  03/12/20 1230     WBC 5.94 10*3/mm3      RBC 5.12 10*6/mm3      Hemoglobin 14.4 g/dL      Hematocrit 44.1 %      MCV 86.1 fL      MCH 28.1 pg      MCHC 32.7 g/dL      RDW 14.4 %      RDW-SD 45.2 fl      MPV 12.9 fL      Platelets 122 10*3/mm3      Neutrophil % 61.6 %      Lymphocyte % 25.9 %      Monocyte % 8.1 %      Eosinophil % 3.2 %      Basophil % 0.7 %      Neutrophils, Absolute 3.66 10*3/mm3      Lymphocytes, Absolute 1.54 10*3/mm3      Monocytes, Absolute 0.48 10*3/mm3      Eosinophils, Absolute 0.19 10*3/mm3      Basophils, Absolute 0.04 10*3/mm3           I ordered the above labs and reviewed the results    RADIOLOGY  No orders to display    doppler is negative acute for DVT    I ordered the above noted radiological studies. Interpreted by radiologist. Discussed with radiologist (). Reviewed by me in PACS.       PROCEDURES  Procedures      PROGRESS AND CONSULTS         1252 Doppler negative acute for DVT.     1326 Rechecked pt. Pt is resting comfortably in NAD. Notified  pt of test results, including negative doppler and therapeutic INR. Discussed the plan to discharge the pt home. I instructed the pt to wear compression stockings and elevate his leg. I advised the pt to f/u with his PCP for further evaluation. Pt understands and agrees with the plan, all questions answered.      MEDICAL DECISION MAKING  Results were reviewed/discussed with the patient and they were also made aware of online access. Pt also made aware that some labs, such as cultures, will not be resulted during ER visit and follow up with PMD is necessary.     MDM  Number of Diagnoses or Management Options  Leg edema, left:      Amount and/or Complexity of Data Reviewed  Clinical lab tests: ordered and reviewed (INR = 2.43)  Tests in the radiology section of CPT®: reviewed and ordered (Doppler negative acute for DVT)  Decide to obtain previous medical records or to obtain history from someone other than the patient: yes           DIAGNOSIS  Final diagnoses:   Leg edema, left       DISPOSITION  DISCHARGE    Patient discharged in stable condition.    Reviewed implications of results, diagnosis, meds, responsibility to follow up, warning signs and symptoms of possible worsening, potential complications and reasons to return to ER.    Patient/Family voiced understanding of above instructions.    Discussed plan for discharge, as there is no emergent indication for admission. Patient referred to primary care provider for BP management due to today's BP. Pt/family is agreeable and understands need for follow up and repeat testing.  Pt is aware that discharge does not mean that nothing is wrong but it indicates no emergency is present that requires admission and they must continue care with follow-up as given below or physician of their choice.     FOLLOW-UP  Rubin Hinkle, LIZA  [address] [facility] [address] [phone]    Schedule an appointment as soon as possible for a visit   for continued evaluation of  swelling         Medication List      Changed    furosemide 20 MG tablet  Commonly known as:  LASIX  TAKE ONE TABLET BY MOUTH DAILY  What changed:    how much to take  how to take this  when to take this  additional instructions     insulin degludec 100 UNIT/ML solution pen-injector injection  Commonly known as:  TRESIBA FLEXTOUCH  Inject 60 Units under the skin into the appropriate area as directed   Daily.  What changed:    how much to take  when to take this              Latest Documented Vital Signs:  As of 13:28  BP- 131/85 HR- 76 Temp- 96.7 °F (35.9 °C) (Tympanic) O2 sat- 96%    --  Documentation assistance provided by jordan Rice for Dr. Strauss.  Information recorded by the scribe was done at my direction and has been verified and validated by me.           Braydon Rice  03/12/20 3326       Dewey Strauss MD  03/12/20 5150

## 2020-03-12 NOTE — THERAPY TREATMENT NOTE
"    Outpatient Physical Therapy Neuro Treatment Note  [facility]     Patient Name: Francisco Sequeira  : 1935  MRN: [id number]  Today's Date: 3/12/2020      Visit Date: 2020    Visit Dx:    ICD-10-CM ICD-9-CM   1. Cerebrovascular accident (CVA), unspecified mechanism (CMS/HCC) I63.9 434.91   2. Functional gait abnormality R26.89 781.2       Patient Active Problem List   Diagnosis   • Atrial fibrillation (CMS/HCC)   • Hypertension   • Atopic rhinitis   • Uncontrolled type 2 diabetes mellitus (CMS/HCC)   • Gastroesophageal reflux disease   • Hyperlipidemia   • Renal insufficiency   • Low testosterone   • Special screening for malignant neoplasm of prostate   • Hx of aortic valve replacement, mechanical [Z95.2]   • Screening for iron deficiency anemia   • Stroke-like symptom   • Kidney carcinoma (CMS/HCC)   • CKD (chronic kidney disease) stage 3, GFR 30-59 ml/min (CMS/HCC)   • BYRON (acute kidney injury) (CMS/HCC)   • Acute cerebral infarction (CMS/HCC)           PT Neuro     Row Name 20 0845             Subjective Comments    Subjective Comments  At beginning of session pt denied any pain. Pt reporting he liked the quad tipped cane. Near end of session pt ask, \"My left leg is swollen. Is that from the stroke?\" Pt denied pain with palpation.   -LB         Subjective Pain    Able to rate subjective pain?  yes  -LB      Pre-Treatment Pain Level  0  -LB      Post-Treatment Pain Level  0  -LB         Cognition    Overall Cognitive Status  WFL  -LB      Memory  Appears intact  -LB      Orientation Level  Oriented to situation;Oriented to person;Oriented to time  -LB      Safety Judgment  Decreased awareness of need for safety  -LB      Deficits  Decreased awareness of deficits  -LB         Posture/Observations    Alignment Options  Forward head;Thoracic kyphosis;Lumbar lordosis  -LB      Forward Head  Moderate  -LB      Lumbar lordosis  Decreased  -LB      Posture/Observations Comments  Pt's Left LE calf " swollen and tight. No pain, redness or increased temperature.  -LB         General ROM    GENERAL ROM COMMENTS  Left ankle ROM WNL   -LB         Transfers    Sit-Stand McClure (Transfers)  stand by assist;verbal cues  -LB      Stand-Sit [place]ure (Transfers)  stand by assist;verbal cues  -LB      Transfers, Sit-Stand-Sit, Assist Device  rolling walker quad tipped cane   -LB      Transfer, Safety Issues  sequencing ability decreased;weight-shifting ability decreased  -LB      Transfer, Impairments  impaired balance;strength decreased;postural control impaired  -LB         Gait/Stairs Assessment/Training    McClure Level (Gait)  contact guard;verbal cues  -LB      Assistive Device (Gait)  other (see comments) quad tipped cane   -LB      Distance in Feet (Gait)  90' x 4 with quad tipped cane  -LB      Pattern (Gait)  step-to;step-through  -LB      Deviations/Abnormal Patterns (Gait)  gait speed decreased;stride length decreased  -LB      Bilateral Gait Deviations  heel strike decreased;forward flexed posture  -LB      Left Sided Gait Deviations  -- no left foot drag noted   -LB      Right Sided Gait Deviations  weight shift ability decreased  -LB      [place]Clure Level (Stairs)  minimum assist (75% patient effort) HHA on the R   -LB      Assistive Device (Stairs)  other (see comments) quad tipped cane   -LB      Number of Steps (Stairs)  curb  performed 3 x   -LB      Stairs, Safety Issues  sequencing ability decreased  -LB      Stairs, Impairments  strength decreased;impaired balance;coordination impaired  -LB      Comment (Gait/Stairs)  No ambulation or activity performed after pt reported the swelling in his left leg.   -LB        User Key  (r) = Recorded By, (t) = Taken By, (c) = Cosigned By    Initials Name Provider Type    LB Sandi Reza, PT Physical Therapist                  PT Assessment/Plan     Row Name 03/12/20 1003          PT Assessment    Assessment Comments  Pt denied any pain or any  "changes. Pt denied any falls. After ~ 40 minutes of session pt ask if the swelling in his left leg was because of his stroke. Pt evaluated pt's left LE and noted swelling and tightness. No pain, redness or increased temperature. Pt was advised to call the primary care MD.   -LB       User Key  (r) = Recorded By, (t) = Taken By, (c) = Cosigned By    Initials Name Provider Type    Sandi Fink PT Physical Therapist             OP Exercises     Row Name 03/12/20 0845             Subjective Comments    Subjective Comments  At beginning of session pt denied any pain. Pt reporting he liked the quad tipped cane. Near end of session pt ask, \"My left leg is swollen. Is that from the stroke?\" Pt denied pain with palpation.   -LB         Subjective Pain    Able to rate subjective pain?  yes  -LB      Pre-Treatment Pain Level  0  -LB      Post-Treatment Pain Level  0  -LB         Exercise 5    Exercise Name 5  bilateral plantarflexion in standing   -LB      Cueing 5  Verbal;Tactile  -LB      Reps 5  10  -LB      Additional Comments  performed prior to pt reporting his left leg was swollen   -LB        User Key  (r) = Recorded By, (t) = Taken By, (c) = Cosigned By    Initials Name Provider Type    Sandi Fink PT Physical Therapist                          Therapy Education  Education Details: PT recommended pt and wife consult the MD regarding his left leg swollen and tight.   Given: Mobility training, Symptoms/condition management, Other (comment)  Provided to: Patient  Level of Understanding: Verbalized              Time Calculation:   Start Time: 0845  Stop Time: 0924  Time Calculation (min): 39 min  Total Timed Code Minutes- PT: 39 minute(s)   Therapy Charges for Today     Code Description Service Date Service Provider Modifiers Qty    64737486431  PT NEUROMUSC RE EDUCATION EA 15 MIN 3/12/2020 Sandi Reza, PT GP 1    [id number]  PT THER PROC EA 15 MIN 3/12/2020 Sandi Reza, PT GP 1    [id number]  " PT THERAPEUTIC ACT EA 15 MIN 3/12/2020 Sandi Reza, PT GP 1                    Sandi Reza, PT  3/12/2020

## 2020-03-12 NOTE — ED TRIAGE NOTES
Pt states that he was discharged from here for a stroke in late Jan. Reports that his left foot was swollen then and hasn't gotten better. States that it hasn't gotten worse either.

## 2020-03-12 NOTE — DISCHARGE INSTRUCTIONS
Elevate and compression stockings to left leg  Return for any problems.  Follow-up with your PCP for continued evaluation of the symptoms.

## 2020-03-12 NOTE — PROGRESS NOTES
3/12/20 Lt. LEV duplex preliminary findings are negative for DVT. Preliminary report given to Dr. Strauss.

## 2020-03-16 ENCOUNTER — HOSPITAL ENCOUNTER (OUTPATIENT)
Dept: PHYSICAL THERAPY | Facility: HOSPITAL | Age: 85
Setting detail: THERAPIES SERIES
Discharge: HOME | End: 2020-03-16

## 2020-03-16 DIAGNOSIS — R26.89 FUNCTIONAL GAIT ABNORMALITY: ICD-10-CM

## 2020-03-16 DIAGNOSIS — I63.9 CEREBROVASCULAR ACCIDENT (CVA), UNSPECIFIED MECHANISM (HCC): Primary | ICD-10-CM

## 2020-03-16 PROCEDURE — 97112 NEUROMUSCULAR REEDUCATION: CPT

## 2020-03-16 PROCEDURE — 97530 THERAPEUTIC ACTIVITIES: CPT

## 2020-03-16 PROCEDURE — 97110 THERAPEUTIC EXERCISES: CPT

## 2020-03-16 NOTE — THERAPY TREATMENT NOTE
"    Outpatient Physical Therapy Neuro Treatment Note  [facility]     Patient Name: Francisco Sequeira  : 1935  MRN: [id number]  Today's Date: 3/16/2020      Visit Date: 2020    Visit Dx:    ICD-10-CM ICD-9-CM   1. Cerebrovascular accident (CVA), unspecified mechanism (CMS/HCC) I63.9 434.91   2. Functional gait abnormality R26.89 781.2       Patient Active Problem List   Diagnosis   • Atrial fibrillation (CMS/HCC)   • Hypertension   • Atopic rhinitis   • Uncontrolled type 2 diabetes mellitus (CMS/HCC)   • Gastroesophageal reflux disease   • Hyperlipidemia   • Renal insufficiency   • Low testosterone   • Special screening for malignant neoplasm of prostate   • Hx of aortic valve replacement, mechanical [Z95.2]   • Screening for iron deficiency anemia   • Stroke-like symptom   • Kidney carcinoma (CMS/HCC)   • CKD (chronic kidney disease) stage 3, GFR 30-59 ml/min (CMS/HCC)   • BYRON (acute kidney injury) (CMS/HCC)   • Acute cerebral infarction (CMS/HCC)           PT Neuro     Row Name 20 1431             Subjective Comments    Subjective Comments  Pt reporting he went to the ER and the test was negative. Pt reports he is now wearing compression socks.\" Pt reports he likes the quad tipped cane.   -LB         Subjective Pain    Able to rate subjective pain?  yes  -LB      Pre-Treatment Pain Level  0  -LB      Post-Treatment Pain Level  0  -LB         Cognition    Overall Cognitive Status  WFL  -LB      Memory  Appears intact  -LB      Orientation Level  Oriented to situation;Oriented to person;Oriented to time  -LB      Safety Judgment  Decreased awareness of need for safety  -LB      Deficits  Decreased awareness of deficits  -LB      Comments  Improved awareness of safety.   -LB         Posture/Observations    Alignment Options  Forward head;Thoracic kyphosis;Lumbar lordosis  -LB      Forward Head  Moderate  -LB      Lumbar lordosis  Decreased  -LB      Posture/Observations Comments  Minimal " swelling noted in the L LE calf swollen and calf. Pt wearing compression socks which had rolled down and caused an indentation.   -LB         Transfers    Sit-Stand Towner (Transfers)  contact guard;verbal cues  -LB      Stand-Sit Towner (Transfers)  contact guard;verbal cues  -LB      Transfers, Sit-Stand-Sit, Assist Device  -- quad tipped cane   -LB      Transfer, Safety Issues  sequencing ability decreased;weight-shifting ability decreased  -LB      Transfer, Impairments  impaired balance;strength decreased;postural control impaired  -LB      Comment (Transfers)  Pt required frequent cues to place hands on chair seat verses on cane when coing to stnad or to sit.   -LB         Gait/Stairs Assessment/Training    Towner Level (Gait)  contact guard;verbal cues verbal cues to increase stance time on the L LE  -LB      Assistive Device (Gait)  -- quad tipped cane   -LB      Distance in Feet (Gait)  90' x 5 with changing directions and obstacles  -LB      Pattern (Gait)  step-through  -LB      Deviations/Abnormal Patterns (Gait)  gait speed decreased;stride length decreased  -LB      Bilateral Gait Deviations  heel strike decreased;forward flexed posture  -LB      Left Sided Gait Deviations  weight shift ability decreased no left foot drag noted as on IE  -LB      Right Sided Gait Deviations  weight shift ability decreased  -LB      Towner Level (Stairs)  contact guard;verbal cues  -LB      Assistive Device (Stairs)  -- quad tipped cane   -LB      Number of Steps (Stairs)  curb   -LB      Stairs, Safety Issues  sequencing ability decreased  -LB      Stairs, Impairments  strength decreased;impaired balance;coordination impaired  -LB      Comment (Gait/Stairs)  No complaints of tightness or pain during PT sesison.   -LB         Balance Skills Training    Standing-Level of Assistance  Contact guard;Minimum assistance  -LB      Static Standing Balance Support  No upper extremity supported  -LB       "Standing-Balance Activities  Semi-tandum with cane horizontally and raising nad lowering the cane  -LB      Gait Balance-Level of Assistance  Contact guard;Minimum assistance  -LB      Gait Balance Support  assistive device  -LB      Gait Balance Activities  backwards  -LB        User Key  (r) = Recorded By, (t) = Taken By, (c) = Cosigned By    Initials Name Provider Type    Sandi Fink, PT Physical Therapist                  PT Assessment/Plan     Row Name 03/16/20 1712          PT Assessment    Assessment Comments  Pt and wife reporting they went to the ER after PT the other day and the doppler was negaitve and pt is now wearing compression socks. Pt's left calf with minimal swelling but decreased tightness in the calf overall. Pt demonstrated improvement with transfers as increased wt shift forward over his LE's   -LB       User Key  (r) = Recorded By, (t) = Taken By, (c) = Cosigned By    Initials Name Provider Type    Sandi Fink PT Physical Therapist             OP Exercises     Row Name 03/16/20 6901             Subjective Comments    Subjective Comments  Pt reporting he went to the ER and the test was negative. Pt reports he is now wearing compression socks.\" Pt reports he likes the quad tipped cane.   -LB         Subjective Pain    Able to rate subjective pain?  yes  -LB      Pre-Treatment Pain Level  0  -LB      Post-Treatment Pain Level  0  -LB         Exercise 5    Exercise Name 5  bilateral plantarflexion in standing   -LB      Cueing 5  Verbal  -LB      Reps 5  15  -LB        User Key  (r) = Recorded By, (t) = Taken By, (c) = Cosigned By    Initials Name Provider Type    Sandi Fink PT Physical Therapist                          Therapy Education  Education Details: Discussed with pt and wife to make sure to check the socks fewquently to see if they had rolled down.   Given: Symptoms/condition management  How Provided: Verbal  Provided to: Patient, Caregiver  Level of Understanding: " Verbalized              Time Calculation:   Start Time: 1431  Stop Time: 1516  Time Calculation (min): 45 min  Total Timed Code Minutes- PT: 45 minute(s)   Therapy Charges for Today     Code Description Service Date Service Provider Modifiers Qty    [id number]  PT NEUROMUSC RE EDUCATION EA 15 MIN 3/16/2020 Sandi Reza, PT GP 1    [id number]  PT THER PROC EA 15 MIN 3/16/2020 Sandi Reza, PT GP 1    [id number]  PT THERAPEUTIC ACT EA 15 MIN 3/16/2020 Sandi Reza, PT GP 1                    Sandi Reza, PT  3/16/2020

## 2020-03-19 ENCOUNTER — HOSPITAL ENCOUNTER (OUTPATIENT)
Dept: PHYSICAL THERAPY | Facility: HOSPITAL | Age: 85
Setting detail: THERAPIES SERIES
Discharge: HOME | End: 2020-03-19

## 2020-03-19 ENCOUNTER — ANTICOAGULATION VISIT (OUTPATIENT)
Dept: PHARMACY | Facility: HOSPITAL | Age: 85
End: 2020-03-19

## 2020-03-19 DIAGNOSIS — Z95.2 HX OF AORTIC VALVE REPLACEMENT, MECHANICAL: ICD-10-CM

## 2020-03-19 DIAGNOSIS — I63.9 CEREBROVASCULAR ACCIDENT (CVA), UNSPECIFIED MECHANISM (HCC): Primary | ICD-10-CM

## 2020-03-19 DIAGNOSIS — R26.89 FUNCTIONAL GAIT ABNORMALITY: ICD-10-CM

## 2020-03-19 LAB
INR PPP: 2.7 (ref 0.91–1.09)
PROTHROMBIN TIME: 32.7 SECONDS (ref 10–13.8)

## 2020-03-19 PROCEDURE — 97112 NEUROMUSCULAR REEDUCATION: CPT | Performed by: PHYSICAL THERAPIST

## 2020-03-19 PROCEDURE — G0463 HOSPITAL OUTPT CLINIC VISIT: HCPCS

## 2020-03-19 PROCEDURE — 85610 PROTHROMBIN TIME: CPT

## 2020-03-19 PROCEDURE — 97530 THERAPEUTIC ACTIVITIES: CPT | Performed by: PHYSICAL THERAPIST

## 2020-03-19 PROCEDURE — 97116 GAIT TRAINING THERAPY: CPT | Performed by: PHYSICAL THERAPIST

## 2020-03-19 PROCEDURE — 36416 COLLJ CAPILLARY BLOOD SPEC: CPT

## 2020-03-19 NOTE — THERAPY TREATMENT NOTE
"    Outpatient Physical Therapy Neuro Treatment Note  [facility]     Patient Name: Francisco Sequeira  : 1935  MRN: [id number]  Today's Date: 3/19/2020      Visit Date: 2020    Visit Dx:    ICD-10-CM ICD-9-CM   1. Cerebrovascular accident (CVA), unspecified mechanism (CMS/HCC) I63.9 434.91   2. Functional gait abnormality R26.89 781.2       Patient Active Problem List   Diagnosis   • Atrial fibrillation (CMS/HCC)   • Hypertension   • Atopic rhinitis   • Uncontrolled type 2 diabetes mellitus (CMS/HCC)   • Gastroesophageal reflux disease   • Hyperlipidemia   • Renal insufficiency   • Low testosterone   • Special screening for malignant neoplasm of prostate   • Hx of aortic valve replacement, mechanical [Z95.2]   • Screening for iron deficiency anemia   • Stroke-like symptom   • Kidney carcinoma (CMS/HCC)   • CKD (chronic kidney disease) stage 3, GFR 30-59 ml/min (CMS/HCC)   • BYRON (acute kidney injury) (CMS/HCC)   • Acute cerebral infarction (CMS/HCC)           PT Neuro     Row Name 20 0902             Subjective Comments    Subjective Comments  \"I just got my quad tip cane yesterday. My kids ordered it from [employer]\"  -JK         Subjective Pain    Able to rate subjective pain?  yes  -JK      Pre-Treatment Pain Level  0  -JK      Post-Treatment Pain Level  0  -JK         Cognition    Overall Cognitive Status  WFL  -JK      Arousal/Alertness  Appropriate responses to stimuli  -JK      Memory  Appears intact  -JK      Orientation Level  Oriented to situation;Oriented to person;Oriented to time  -JK      Safety Judgment  Decreased awareness of need for safety  -JK      Deficits  Decreased awareness of deficits  -JK      Comments  Pt arrived to PT with RW and quad tip cane  -JK         Posture/Observations    Alignment Options  Forward head;Thoracic kyphosis;Lumbar lordosis  -JK      Forward Head  Moderate  -JK      Thoracic Kyphosis  Moderate  -JK      Lumbar lordosis  Decreased  -JK         Transfers "    Sit-Stand [place] (Transfers)  contact guard;verbal cues  -JK      Stand-Sit [place] (Transfers)  contact guard;verbal cues  -JK      Transfer, Safety Issues  sequencing ability decreased;weight-shifting ability decreased  -JK      Transfer, Impairments  impaired balance;strength decreased;postural control impaired  -JK         Gait/Stairs Assessment/Training    [place] Level (Gait)  contact guard;verbal cues VC to put all points of cane tip on the floor  -JK      Assistive Device (Gait)  other (see comments) quad tip cane  -JK      Distance in Feet (Gait)  80'; 160'  -JK      Pattern (Gait)  step-through  -JK      Deviations/Abnormal Patterns (Gait)  gait speed decreased;stride length decreased  -JK      Bilateral Gait Deviations  heel strike decreased;forward flexed posture  -JK      Left Sided Gait Deviations  knee hyperextension;forward flexed posture;weight shift ability decreased no L foot drag; step length shortened, knee HE at end of tx  -JK      Right Sided Gait Deviations  weight shift ability decreased  -JK      [place] Level (Stairs)  minimum assist (75% patient effort);contact guard;verbal cues  -JK      Assistive Device (Stairs)  other (see comments) quad tip cane  -JK      Handrail Location (Stairs)  left side (ascending)  -JK      Number of Steps (Stairs)  4 x 2  -JK      Ascending Technique (Stairs)  step-to-step  -JK      Descending Technique (Stairs)  step-to-step  -JK      Stairs, Safety Issues  weight-shifting ability decreased;sequencing ability decreased;balance decreased during turns  -JK      Stairs, Impairments  strength decreased;impaired balance;coordination impaired  -JK      Comment (Gait/Stairs)  strong VCs needed on stairs for sequencing and safe technique and to keep quad tip cane closer while turning to descend stairs  -JK         Balance Skills Training    Standing-Level of Assistance  Contact guard;Minimum assistance  -JK      Static Standing Balance Support  " No upper extremity supported;Right upper extremity supported;parallel bars  -JK      Standing-Balance Activities  Compliant surfaces;Feet together;Standing on 1/2 roll;Standing on foam roll;Semi-tandum see balance comments  -JK      Balance Comments  standing on foam mat, 1/2 foam roll; narrow foam piece with 1/2 foam roll being most difficult for pt; raising cane up/down and rotating cane R/L with head turn while standing on foam mat; standing with feet together and open tandem stance and head up on firm surface. Practiced stopping upon VCs to stop from PT while walking to practice change from walking to static stance with quad tip cane and min/CGA.   -JK        User Key  (r) = Recorded By, (t) = Taken By, (c) = Cosigned By    Initials Name Provider Type    Janie Baker, PT Physical Therapist                  PT Assessment/Plan     Row Name 03/19/20 8668          PT Assessment    Functional Limitations  Decreased safety during functional activities;Impaired gait;Limitation in home management;Performance in self-care ADL  -JK     Impairments  Balance;Gait;Muscle strength;Impaired postural alignment  -JK     Assessment Comments  Pt practiced balance activities such as standing on compliant surfaces, weaving around cones, and practicing unexpected stops with use of quad tip cane. Pt had shorter steps with decreased clearance of L foot while walking around cones and needed VCs to keep cane close to him. Pt's L knee was snapping into hyperetension by end of tx, and pt advised to keep using rolling walker at home primarily due to L LE fatigue affecting quality of gait.   -JK       User Key  (r) = Recorded By, (t) = Taken By, (c) = Cosigned By    Initials Name Provider Type    Janie Baker, PT Physical Therapist             OP Exercises     Row Name 03/19/20 0400             Subjective Comments    Subjective Comments  \"I just got my quad tip cane yesterday. My kids ordered it from [employer]\"  -KRYSTALK         " Subjective Pain    Able to rate subjective pain?  yes  -JK      Pre-Treatment Pain Level  0  -JK      Post-Treatment Pain Level  0  -JK        User Key  (r) = Recorded By, (t) = Taken By, (c) = Cosigned By    Initials Name Provider Type    Janie Baker, PT Physical Therapist                          Therapy Education  Education Details: Discussed with pt and wife to only ambulate at home with quad tip cane going short distances (30'-40') in open spaces (hallway) with wife giving pt CGA and for pt to continue using RW primarily at home for mobility.   Given: Symptoms/condition management, Posture/body mechanics, Mobility training  Program: New, Reinforced  How Provided: Verbal, Demonstration  Provided to: Patient, Caregiver  Level of Understanding: Verbalized, Demonstrated              Time Calculation:   Start Time: 0842  Stop Time: 0928  Time Calculation (min): 46 min  Total Timed Code Minutes- PT: 46 minute(s)   Therapy Charges for Today     Code Description Service Date Service Provider Modifiers Qty    47189416800  PT NEUROMUSC RE EDUCATION EA 15 MIN 3/19/2020 Janie Ash, PT GP 1    [id number]  PT THERAPEUTIC ACT EA 15 MIN 3/19/2020 Janie Ash, PT GP 1    [id number]  GAIT TRAINING EA 15 MIN 3/19/2020 Janie Ash, PT GP 1                    Janie Ash PT  3/19/2020

## 2020-03-19 NOTE — PROGRESS NOTES
Anticoagulation Clinic Progress Note    Anticoagulation Summary  As of 3/19/2020    INR goal:   3.0-3.5   TTR:   69.7 % (1.4 y)   INR used for dosin.7! (3/19/2020)   Warfarin maintenance plan:   5 mg every Mon, Wed, Fri; 7.5 mg all other days   Weekly warfarin total:   45 mg   Plan last modified:   [facility] (3/5/2020)   Next INR check:   2020   Priority:   Maintenance   Target end date:   Indefinite    Indications    Atrial fibrillation (CMS/HCC) [I48.91]  Hx of aortic valve replacement  mechanical [Z95.2] [Z95.2]             Anticoagulation Episode Summary     INR check location:       Preferred lab:       Send INR reminders to:    CHRIS GUTIERREZ [facility]    Comments:   New INR goal 3 - 3.5 (see 2020 hospitalization for CVA)      Anticoagulation Care Providers     Provider Role Specialty Phone number    Griffin Fried MD Referring Cardiology [phone]          Clinic Interview:      INR History:  Anticoagulation Monitoring 2020 3/5/2020 3/19/2020   INR 3.0 2.9 2.7   INR Date 2020 3/5/2020 3/19/2020   INR Goal 3.0-3.5 3.0-3.5 3.0-3.5   Trend Same Up Same   Last Week Total 42.5 mg 42.5 mg 45 mg   Next Week Total 42.5 mg 45 mg 47.5 mg   Sun 5 mg 7.5 mg 7.5 mg   Mon 5 mg 5 mg 5 mg   Tue 7.5 mg 7.5 mg 7.5 mg   Wed 5 mg 5 mg 5 mg   Thu 7.5 mg 7.5 mg 10 mg (3/19); Otherwise 7.5 mg   Fri 5 mg 5 mg 5 mg   Sat 7.5 mg 7.5 mg 7.5 mg   Visit Report - - -   Some recent data might be hidden       Plan:  1. INR is Subtherapeutic today- see above in Anticoagulation Summary.  Will instruct Francisco Sequeira to boost warfarin today, then continue their warfarin regimen- see above in Anticoagulation Summary.  2. Follow up in 2 weeks  3. Patient declines warfarin refills.  4. Verbal and written information provided. Patient expresses understanding and has no further questions at this time.    Audra Vazquez [facility]

## 2020-03-21 DIAGNOSIS — L08.9 SOFT TISSUE INFECTION: ICD-10-CM

## 2020-03-21 DIAGNOSIS — IMO0002 UNCONTROLLED TYPE 2 DIABETES MELLITUS WITH COMPLICATION, WITH LONG-TERM CURRENT USE OF INSULIN: ICD-10-CM

## 2020-03-23 ENCOUNTER — APPOINTMENT (OUTPATIENT)
Dept: PHYSICAL THERAPY | Facility: HOSPITAL | Age: 85
End: 2020-03-23

## 2020-03-23 RX ORDER — SUB-Q INSULIN DEVICE, 40 UNIT
EACH MISCELLANEOUS
Qty: 30 EACH | Refills: 3 | Status: SHIPPED | OUTPATIENT
Start: 2020-03-23 | End: 2020-07-23 | Stop reason: SDUPTHER

## 2020-03-24 ENCOUNTER — HOSPITAL ENCOUNTER (OUTPATIENT)
Dept: PHYSICAL THERAPY | Facility: HOSPITAL | Age: 85
Setting detail: THERAPIES SERIES
Discharge: HOME | End: 2020-03-24

## 2020-03-24 DIAGNOSIS — I63.9 CEREBROVASCULAR ACCIDENT (CVA), UNSPECIFIED MECHANISM (HCC): Primary | ICD-10-CM

## 2020-03-24 DIAGNOSIS — R26.89 FUNCTIONAL GAIT ABNORMALITY: ICD-10-CM

## 2020-03-24 PROCEDURE — 97530 THERAPEUTIC ACTIVITIES: CPT

## 2020-03-24 PROCEDURE — 97112 NEUROMUSCULAR REEDUCATION: CPT

## 2020-03-24 NOTE — THERAPY PROGRESS REPORT/RE-CERT
"    Outpatient Physical Therapy Neuro Progress Note  [facility]     Patient Name: Francisco Sequeira  : 1935  MRN: [id number]  Today's Date: 3/24/2020      Visit Date: 2020    Visit Dx:    ICD-10-CM ICD-9-CM   1. Cerebrovascular accident (CVA), unspecified mechanism (CMS/HCC) I63.9 434.91   2. Functional gait abnormality R26.89 781.2       Patient Active Problem List   Diagnosis   • Atrial fibrillation (CMS/HCC)   • Hypertension   • Atopic rhinitis   • Uncontrolled type 2 diabetes mellitus (CMS/HCC)   • Gastroesophageal reflux disease   • Hyperlipidemia   • Renal insufficiency   • Low testosterone   • Special screening for malignant neoplasm of prostate   • Hx of aortic valve replacement, mechanical [Z95.2]   • Screening for iron deficiency anemia   • Stroke-like symptom   • Kidney carcinoma (CMS/HCC)   • CKD (chronic kidney disease) stage 3, GFR 30-59 ml/min (CMS/HCC)   • BYRON (acute kidney injury) (CMS/HCC)   • Acute cerebral infarction (CMS/HCC)           PT Neuro     Row Name 20 1330             Subjective Comments    Subjective Comments  \"No falls.\" \"My leg is still a little swollen. It doesn't feel tight. I am wearing my compression socks.\"   -LB         Subjective Pain    Able to rate subjective pain?  yes  -LB      Pre-Treatment Pain Level  0  -LB      Post-Treatment Pain Level  0  -LB         Cognition    Overall Cognitive Status  WFL  -LB      Orientation Level  Oriented X4  -LB      Safety Judgment  -- improved awareness of safety   -LB      Deficits  -- Improved awareness of deficits  -LB      Comments  Pt arrived to PT walking with RWx and carrying quad tip cane  -LB         Posture/Observations    Alignment Options  Forward head;Thoracic kyphosis;Lumbar lordosis  -LB      Forward Head  Moderate  -LB      Thoracic Kyphosis  Moderate  -LB      Lumbar lordosis  Decreased  -LB         MMT Left Lower Ext    Lt Hip Flexion MMT, Gross Movement  (5/5) normal  -LB      Lt Hip ABduction MMT, " Gross Movement  (4+/5) good plus  -LB      Lt Ankle Dorsiflexion MMT, Gross Movement  (5/5) normal  -LB      Lt Ankle Subtalar Inversion MMT, Gross Movement  (5/5) normal  -LB      Lt Ankle Subtalar Eversion MMT, Gross Movement  (5/5) normal  -LB         Transfers    Sit-Stand [place] (Transfers)  contact guard;verbal cues for hand placement on the mat verses the cane  -LB      Stand-Sit [place] (Transfers)  contact guard;verbal cues for hand placement on the mat verses the cane  -LB      Transfers, Sit-Stand-Sit, Assist Device  other (see comments) quad tipped cane  -LB      Transfer, Safety Issues  sequencing ability decreased  -LB      Transfer, Impairments  impaired balance;postural control impaired  -LB      Comment (Transfers)  Pt comes to stand and to sit from an armless chair to his rolling walker conditional independent Pt demonstrating good weight shift ofrward over his LE's with to stand and to sit. .   -LB         Gait/Stairs Assessment/Training    [place] Level (Gait)  contact guard;verbal cues  -LB      Assistive Device (Gait)  other (see comments) quad tipped cane   -LB      Pattern (Gait)  step-through  -LB      Deviations/Abnormal Patterns (Gait)  gait speed decreased;stride length decreased  -LB      Bilateral Gait Deviations  heel strike decreased;weight shift ability decreased decreased weight shift forward over LE's w to stand & to sit  -LB      Left Sided Gait Deviations  knee hyperextension;forward flexed posture;weight shift ability decreased  -LB      Right Sided Gait Deviations  weight shift ability decreased  -LB      [place] Level (Stairs)  contact guard  -LB      Assistive Device (Stairs)  other (see comments) quad tipped cane   -LB      Handrail Location (Stairs)  left side (ascending)  -LB      Number of Steps (Stairs)  4  -LB      Ascending Technique (Stairs)  step-over-step  -LB      Descending Technique (Stairs)  step-over-step  -LB      Stairs, Safety Issues   "weight-shifting ability decreased  -LB      Stairs, Impairments  impaired balance;coordination impaired  -LB      Comment (Gait/Stairs)  see DGI and TUG   -LB         Balance Skills Training    Standing-Level of Assistance  Minimum assistance  -LB      Static Standing Balance Support  No upper extremity supported  -LB      Standing-Balance Activities  Tandem Stance  -LB      Balance Comments  see Alfreda SEVILLA   -LB        User Key  (r) = Recorded By, (t) = Taken By, (c) = Cosigned By    Initials Name Provider Type    Sandi Fink, PT Physical Therapist                  PT Assessment/Plan     Row Name 03/24/20 3964          PT Assessment    Assessment Comments  Pt demonstraed improved strength in his left hip flexors and ankle evertors. Pt demonstrated improvement with transfers as consistently wt shifting forward over his LE's with to stand. Pt demonstrated improvement with transfers, gait and balance as indicated by the SEVILLA, Tinetti, and Dynamic Gait index. Pt would benefit from continued PT with emphasis on gait and balance.   -LB        PT Plan    PT Frequency  1x/week;2x/week  -LB     Predicted Duration of Therapy Intervention (Therapy Eval)  8 weeks   -LB       User Key  (r) = Recorded By, (t) = Taken By, (c) = Cosigned By    Initials Name Provider Type    Sandi Fink, PT Physical Therapist             OP Exercises     Row Name 03/24/20 1330             Subjective Comments    Subjective Comments  \"No falls.\" \"My leg is still a little swollen. It doesn't feel tight. I am wearing my compression socks.\"   -LB         Subjective Pain    Able to rate subjective pain?  yes  -LB      Pre-Treatment Pain Level  0  -LB      Post-Treatment Pain Level  0  -LB        User Key  (r) = Recorded By, (t) = Taken By, (c) = Cosigned By    Initials Name Provider Type    Sandi Fink, PT Physical Therapist                      PT OP Goals     Row Name 03/24/20 1330          PT Short Term Goals    STG Date to " Achieve  04/23/20  -LB     STG 1  Pt to come to stand from an armless chair to his walker conditional independent.   -LB     STG 1 Progress  Met  -LB     STG 2  Pt to perform sit to stand and stand to sit with increased weight shift over his LE's.   -LB     STG 2 Progress  Met  -LB     STG 3  Pt to ambulate with decreased increased left toe clearance.   -LB     STG 3 Progress  Met  -LB     STG 4  Pt to improve score on the SEVILLA to 36/56 to improve balance and reduce risk of falls.  -LB     STG 5  Pt to improve score on the Dynamic Gait Index to 12/24 to improve balance and reduce risk of falls.   -LB     STG 6  Pt to ascend and descend 4 steps with one rail step to step with CGA with correct sequencing.   -LB     STG 6 Progress  Met  -LB     STG 7  Pt to perform floor transfers with UE support with minimal of 1.   -LB     STG 7 Progress  Ongoing  -LB     STG 8  Pt to ambulate ~ 250' with rolling walker with left heelstrike and stride length.   -LB     STG 8 Progress  Progressing;Ongoing  -LB        Long Term Goals    LTG Date to Achieve  05/21/20  -LB     LTG 1  Pt to come to stand from an armless chair with least restricitve device conditional independent.   -LB     LTG 1 Progress  Progressing;Ongoing  -LB     LTG 2  Pt to complete the TUG in < 15 seconds while ambulating with least restrictive device.   -LB     LTG 2 Progress  Progressing;Ongoing  -LB     LTG 3  Pt to increase strength left hip flexors and abductors to 5/5.   -LB     LTG 3 Progress  Met  -LB     LTG 4  Pt to improve score on the SEVILLA to 41/56 to improve balance and reduce risk of falls.  -LB     LTG 4 Progress  Progressing;Ongoing  -LB     LTG 4 Progress Comments  Pt scored a 39/56.  -LB     LTG 5  Pt to improve score on the Dynamic Gait Index to 17/24 to improve balance and reduce risk of falls.  -LB     LTG 5 Progress  Progressing;Ongoing  -LB     LTG 5 Progress Comments  Pt scored a 13/24.  -LB     LTG 6  Pt to ascend and descend 3 steps with  least restrictive device with SBA.   -LB     LTG 6 Progress  Ongoing  -LB     LTG 7  Pt to perform floor transfers with UE support with SBA to conditional independent.   -LB     LTG 7 Progress  Ongoing  -LB     LTG 8  Pt to ambulate >500' with least restricitve device on level surfaces conditional independent.   -LB     LTG 8 Progress  Progressing;Ongoing  -LB        Time Calculation    PT Goal Re-Cert Due Date  03/23/21  -LB       User Key  (r) = Recorded By, (t) = Taken By, (c) = Cosigned By    Initials Name Provider Type    Sandi Fink, PT Physical Therapist          Therapy Education  Education Details: Reviewed with pt his improvement with strength and balance. Advised pt to continue with ambulation with quad tipped cane only when his wife can provide CGA.  Given: Symptoms/condition management, Mobility training  How Provided: Verbal  Provided to: Patient  Level of Understanding: Verbalized    Outcome Measure Options: Tinetti  Harp Balance Scale  Sitting to Standing: able to stand independently using hands  Standing Unsupported: able to stand safely for 2 minutes  Sitting with Back Unsupported but Feet Supported on Floor or on Stool: able to sit safely and securely for 2 minutes  Standing to Sitting: controls descent by using hands  Transfers: able to transfer with verbal cuing and/or supervision  Standing Unsupported with Eyes Closed: able to stand 10 seconds with supervision  Standing Unsupported with Feet Together: able to place feet together independently but unable to hold for 30 seconds  Reaching Forward with Outstretched Arm While Standing: can reach forward 12 cm (5 inches)   Object From the Floor From a Standing Position: able to  object but needs supervision  Turning to Look Behind Over Left and Right Shoulders While Standing: looks behind one side only other side shows less weight shift  Turn 360 Degrees: needs close supervision or verbal cuing  Place Alternate Foot on Step or  Stool While Standing Unsupported: able to complete 4 steps without aid with supervision  Standing Unsupported with One Foot in Front: needs help to step but can hold 15 seconds  Standing on One Leg: unable to try of needs assist to prevent fall  Harp Total Score: 34  Harp Comments: fall risk   Dynamic Gait Index (DGI)  Gait Level Surface: Mild impairment: walks 20’, uses assistive devices, slower speed, mild gait deviations  Change in Gait Speed: Mild impairment: Is able to change speed but demonstrates mild gait deviations, or no gait deviations but unable to achieve a significant change in velocity, or uses as assistive device  Gait with Horizontal Head Turns: Mild impairment: Performs head turns smoothly with slight change in gait velocity, i.e. minor disruption to smooth gait path or uses walking aid.  Gait with Vertical Head Turns: Mild impairment: Performs head turns smoothly with slight change in gait velocity, i.e. minor disruption to smooth gait path or uses walking aid.  Gait and Pivot Turn: Mild impairment: pivot turns safely in >3 seconds and stops with no loss of balance.  Step Over Obstacle: Moderate impairment: Is able to step over box but must stop, then step over. May require verbal cueing.  Step Around Obstacles: Moderate impairment: Is able to clear cones but must significantly slow speed to accomplish task, or requires verbal cueing.  Steps: Moderate impairment: Two feet to a stair, must use rail.  Dynamic Gait Index Score: 13  Dynamic Gait Index Comments: risk for falls   Tinetti Assessment  Tinetti Assessment: yes  Sitting Balance: Steady,safe  Arises: Able, uses arms  Attempts to Rise: Able, requires > 1 attempt  Immediate Standing Balance (first 5 sec): Steady without support  Standing Balance: Steady, stance > 4 inch KACY & requires support  Sternal Nudge (feet close together): Stagger, grabs, catches self  Eyes Closed (feet close together): Steady  Turning 360 Degrees- Steps: Continuous  "steps  Turning 360 Degrees- Steadiness: Steady  Sitting Down: Uses arms or not a smooth motion  Tinetti Balance Score: 11  Gait Initiation (immediate after told \"go\"): No hesitancy  Step Length- Right Swing: Right swing foot passes Left stance leg  Step Length- Left Swing: Left swing foot passes Right  Foot Clearance- Right Foot: Right foot completely clears floor  Foot Clearance- Left Foot: Left foot completely clears floor  Step Symmetry: Right and Left step length equal  Step Continuity: Steps appear continuous  Path (excursion): Mild/moderate deviation or use of aid  Trunk: Marked sway or uses device  Base of Support: Heels close while walking  Gait Score: 9  Tinetti Total Score: 20  Tinetti Assistive Device: rolling walker  Timed Up and Go (TUG)  TUG Test 1: 20 seconds  Timed Up and Go Comments: with rolling walker      Time Calculation:   Start Time: 1330  Stop Time: 1400  Time Calculation (min): 30 min  Total Timed Code Minutes- PT: 30 minute(s)   Therapy Charges for Today     Code Description Service Date Service Provider Modifiers Qty    [id number]  PT NEUROMUSC RE EDUCATION EA 15 MIN 3/24/2020 Sandi Reza, PT GP 1    [id number]  PT THERAPEUTIC ACT EA 15 MIN 3/24/2020 Sandi Reza, PT GP 1          PT G-Codes  Outcome Measure Options: Tinetti  Harp Total Score: 34  Tinetti Total Score: 20  TUG Test 1: 20 seconds         Sandi Reza, PT  3/24/2020     "

## 2020-03-24 NOTE — TELEPHONE ENCOUNTER
"Per last OV:  \"Chronic Zolpidem use for insomnia  Has been reliant on this for the past 10 years, this is been a long-term medication for him I refilled it today with the caveat I would like to discuss this further with him at her next appointment before we refill the medication.  Would like to reduce his reliance on this medication moving forward especially given risks in patients with advanced age including memory and confusion as well as falls.  Will discuss at next appointment in 3 months prior to refilling again.\"  " Prefer not to hold Coumadin but if necessary can hold 3 days prior to extraction.

## 2020-03-26 ENCOUNTER — APPOINTMENT (OUTPATIENT)
Dept: PHYSICAL THERAPY | Facility: HOSPITAL | Age: 85
End: 2020-03-26

## 2020-03-30 ENCOUNTER — APPOINTMENT (OUTPATIENT)
Dept: PHYSICAL THERAPY | Facility: HOSPITAL | Age: 85
End: 2020-03-30

## 2020-03-30 RX ORDER — DIGOXIN 125 MCG
TABLET ORAL
Qty: 90 TABLET | Refills: 0 | Status: SHIPPED | OUTPATIENT
Start: 2020-03-30 | End: 2020-06-28

## 2020-03-31 ENCOUNTER — HOSPITAL ENCOUNTER (OUTPATIENT)
Dept: PHYSICAL THERAPY | Facility: HOSPITAL | Age: 85
Setting detail: THERAPIES SERIES
Discharge: HOME | End: 2020-03-31

## 2020-03-31 DIAGNOSIS — R26.89 FUNCTIONAL GAIT ABNORMALITY: ICD-10-CM

## 2020-03-31 DIAGNOSIS — I63.9 CEREBROVASCULAR ACCIDENT (CVA), UNSPECIFIED MECHANISM (HCC): Primary | ICD-10-CM

## 2020-03-31 PROCEDURE — 97112 NEUROMUSCULAR REEDUCATION: CPT

## 2020-03-31 PROCEDURE — 97530 THERAPEUTIC ACTIVITIES: CPT

## 2020-04-01 ENCOUNTER — TELEPHONE (OUTPATIENT)
Dept: PHARMACY | Facility: HOSPITAL | Age: 85
End: 2020-04-01

## 2020-04-01 NOTE — TELEPHONE ENCOUNTER
Spoke to patient's wife (Gladys) to confirm the appt set for 4/2, but she stated the Home testing machine for Francisco was in the mail per the Company but hasn't rec'd it yet. Gladys agreed that if the machine hasn't arrived by Wed. 4/8 to call and make an appt.    Appt. For 4/2 has been cancelled at this time.

## 2020-04-02 ENCOUNTER — TELEPHONE (OUTPATIENT)
Dept: FAMILY MEDICINE CLINIC | Facility: CLINIC | Age: 85
End: 2020-04-02

## 2020-04-02 ENCOUNTER — APPOINTMENT (OUTPATIENT)
Dept: PHYSICAL THERAPY | Facility: HOSPITAL | Age: 85
End: 2020-04-02

## 2020-04-06 ENCOUNTER — APPOINTMENT (OUTPATIENT)
Dept: PHYSICAL THERAPY | Facility: HOSPITAL | Age: 85
End: 2020-04-06

## 2020-04-07 ENCOUNTER — HOSPITAL ENCOUNTER (OUTPATIENT)
Dept: PHYSICAL THERAPY | Facility: HOSPITAL | Age: 85
Setting detail: THERAPIES SERIES
Discharge: HOME | End: 2020-04-07

## 2020-04-07 DIAGNOSIS — R26.89 FUNCTIONAL GAIT ABNORMALITY: ICD-10-CM

## 2020-04-07 DIAGNOSIS — I63.9 CEREBROVASCULAR ACCIDENT (CVA), UNSPECIFIED MECHANISM (HCC): Primary | ICD-10-CM

## 2020-04-07 PROCEDURE — 97112 NEUROMUSCULAR REEDUCATION: CPT

## 2020-04-07 PROCEDURE — 97530 THERAPEUTIC ACTIVITIES: CPT

## 2020-04-07 NOTE — THERAPY TREATMENT NOTE
"    Outpatient Physical Therapy Neuro Treatment Note  [facility]     Patient Name: Francisco Sequeira  : 1935  MRN: [id number]  Today's Date: 2020      Visit Date: 2020    Visit Dx:    ICD-10-CM ICD-9-CM   1. Cerebrovascular accident (CVA), unspecified mechanism (CMS/HCC) I63.9 434.91   2. Functional gait abnormality R26.89 781.2       Patient Active Problem List   Diagnosis   • Atrial fibrillation (CMS/HCC)   • Hypertension   • Atopic rhinitis   • Uncontrolled type 2 diabetes mellitus (CMS/HCC)   • Gastroesophageal reflux disease   • Hyperlipidemia   • Renal insufficiency   • Low testosterone   • Special screening for malignant neoplasm of prostate   • Hx of aortic valve replacement, mechanical [Z95.2]   • Screening for iron deficiency anemia   • Stroke-like symptom   • Kidney carcinoma (CMS/HCC)   • CKD (chronic kidney disease) stage 3, GFR 30-59 ml/min (CMS/HCC)   • BYRON (acute kidney injury) (CMS/HCC)   • Acute cerebral infarction (CMS/HCC)           PT Neuro     Row Name 20 1300             Subjective Comments    Subjective Comments  \"I fell backwards yesterday when I was getting up from the table. I didn't get hurt.\" \"My wife helped me up.\" At the end of the session PT spoke to pt's wife from a safe distance and wife reported her  fell when he moved the walker aside and was going to sit at the table. He lifted his leg around the chair leg.\" \"He scooted to the couch to get up.\"   -LB         Subjective Pain    Able to rate subjective pain?  yes  -LB      Pre-Treatment Pain Level  0  -LB      Post-Treatment Pain Level  0  -LB         Cognition    Overall Cognitive Status  WFL  -LB      Orientation Level  Oriented X4  -LB      Safety Judgment  Decreased awareness of need for safety  -LB      Deficits  Decreased awareness of deficits  -LB      Comments  Pt demonstrated improper technique with going to sit at the table and fell. Per wife he has been instructed in the proper technique " but doesn't use it.   -LB         Posture/Observations    Alignment Options  Forward head;Thoracic kyphosis;Lumbar lordosis  -LB      Forward Head  Moderate  -LB      Thoracic Kyphosis  Moderate  -LB      Lumbar lordosis  Decreased  -LB         Transfers    Sit-Stand [place] (Transfers)  stand by assist;contact guard;verbal cues  -LB      Stand-Sit [place] (Transfers)  stand by assist;contact guard;verbal cues  -LB      Transfers, Sit-Stand-Sit, Assist Device  -- TRANSFERS to a chair positioned at a table x 4  -LB      Transfer, Safety Issues  sequencing ability decreased  -LB      Transfer, Impairments  impaired balance;postural control impaired  -LB      Comment (Transfers)  Worked with pt several times on going to sit at a chair positioned at a table. Pt lacks control when sitting and pulls up on the table each when coming to stand. After 2 x pt was able to improve going to sit and was able to increase wt shift over his LE's with to stand.   -LB         Gait/Stairs Assessment/Training    [place]ole Level (Gait)  contact guard;verbal cues  -LB      Assistive Device (Gait)  -- quad tipped cane   -LB      Pattern (Gait)  step-through  -LB      Deviations/Abnormal Patterns (Gait)  gait speed decreased;stride length decreased  -LB      Left Sided Gait Deviations  weight shift ability decreased  -LB      [place]ole Level (Stairs)  not tested  -LB         Balance Skills Training    Standing-Level of Assistance  Contact guard  -LB      Static Standing Balance Support  Right upper extremity supported;Left upper extremity supported;parallel bars lightly   -LB      Standing-Balance Activities  Compliant surfaces  -LB      Gait Balance-Level of Assistance  Contact guard  -LB      Gait Balance Support  assistive device  -LB      Gait Balance Activities  side-stepping;backwards  -LB        User Key  (r) = Recorded By, (t) = Taken By, (c) = Cosigned By    Initials Name Provider Type    LB Sandi Reza, PT  "Physical Therapist                  PT Assessment/Plan     Row Name 04/07/20 3987          PT Assessment    Assessment Comments  Pt reporting he fell when getting up from a chair positioned at a table. Pt's wife at end of session reports he was sitting down incorrectly at the table when he fell. Pt denied any injury or pain. Pt required strong verbal cues to comprehend the directions for sitting down at a chair positioned at a table.   -LB       User Key  (r) = Recorded By, (t) = Taken By, (c) = Cosigned By    Initials Name Provider Type    Sandi Fink PT Physical Therapist             OP Exercises     Row Name 04/07/20 1300             Subjective Comments    Subjective Comments  \"I fell backwards yesterday when I was getting up from the table. I didn't get hurt.\" \"My wife helped me up.\" At the end of the session PT spoke to pt's wife from a safe distance and wife reported her  fell when he moved the walker aside and was going to sit at the table. He lifted his leg around the chair leg.\" \"He scooted to the couch to get up.\"   -LB         Subjective Pain    Able to rate subjective pain?  yes  -LB      Pre-Treatment Pain Level  0  -LB      Post-Treatment Pain Level  0  -LB        User Key  (r) = Recorded By, (t) = Taken By, (c) = Cosigned By    Initials Name Provider Type    Sandi Fink PT Physical Therapist                          Therapy Education  Education Details: Pt was educated on proper technique with transfers to and from a chair positioned at a table with emphasis on sitting down sideways and then positioing his legs under the table.   Given: Fall prevention and home safety, Mobility training  How Provided: Verbal, Demonstration(PT demonstrated floor transfers to pt and wife from a safe distance. )  Provided to: Patient, Caregiver  Level of Understanding: Teach back education performed, Verbalized, Demonstrated              Time Calculation:   Start Time: 1300  Stop Time: 1332  Time " Calculation (min): 32 min  Total Timed Code Minutes- PT: 32 minute(s)   Therapy Charges for Today     Code Description Service Date Service Provider Modifiers Qty    44068381190  PT NEUROMUSC RE EDUCATION EA 15 MIN 4/7/2020 Sandi Reza, PT GP 1    [id number]  PT THERAPEUTIC ACT EA 15 MIN 4/7/2020 Sandi Reza, PT GP 1                    Sandi Reza, PT  4/7/2020

## 2020-04-09 ENCOUNTER — APPOINTMENT (OUTPATIENT)
Dept: PHYSICAL THERAPY | Facility: HOSPITAL | Age: 85
End: 2020-04-09

## 2020-04-09 ENCOUNTER — TELEMEDICINE (OUTPATIENT)
Dept: NEUROLOGY | Facility: CLINIC | Age: 85
End: 2020-04-09

## 2020-04-09 DIAGNOSIS — I48.20 CHRONIC ATRIAL FIBRILLATION (HCC): Primary | ICD-10-CM

## 2020-04-09 DIAGNOSIS — I63.411 CEREBROVASCULAR ACCIDENT (CVA) DUE TO EMBOLISM OF RIGHT MIDDLE CEREBRAL ARTERY (HCC): ICD-10-CM

## 2020-04-09 PROCEDURE — 99213 OFFICE O/P EST LOW 20 MIN: CPT | Performed by: NURSE PRACTITIONER

## 2020-04-09 NOTE — PROGRESS NOTES
DOS: 2020  NAME: Francisco Sequeira   : 1935  PCP: Rubin Hinkle APRN    Chief Complaint   Patient presents with   • Stroke      Referring MD: No ref. provider found    Neurological Problem:  84 y.o.  male with a Hx of diabetes, hypertension, hyperlipidemia, atrial fibrillation, cardiomyopathy, aortic valve replacement with mechanical aortic valve who presents today via video visit for stroke follow-up.  He is accompanied by his wife.  History is about about the patient, wife, and review of records as summarized below.    Interval History:  Mr. Sequeira presented to [facility] on  with complaints of left-sided weakness and gait disturbance.  He was on Coumadin PTA for his mechanical AV and INR on arrival 2.21.  EKG revealed atrial fibrillation.  Initial CT head was negative for stroke, evidence of scalp contusion noted over right parietal region.  However MRI brain revealed 3 small acute right frontal lobe infarcts and mild small vessel disease.  Bilateral carotid duplex revealed no significant stenosis within bilateral ICAs, plaque noted.  2D echo revealed a normal LA volume, mildly dilated LA size, saline test negative, no evidence of PFO, LV function normal, EF 60%, prosthetic valve normal.  He underwent a ABELARDO that revealed a normal LV, EF 60%, LA severely dilated, no cardiac source identified.  Dr. Fried with cardiology increase patient's INR goal to 3-3.5 given patient suffered a new stroke with a therapeutic INR.  Aspirin 325 mg was also added and high-dose statin continued.  We recommended that the patient aspirin be decreased to 81 mg daily given the patient was not on aspirin prior to admission.  Per review of his discharge summary it was recommended that the patient obtain a machine to check his INR at home with goal 3-3.5.    Today he states that he has been doing very well since being discharged from the hospital in January.  His wife is at his side and agrees.  He has  "been maintained on Coumadin and aspirin 325 mg along with his high-dose statin that he was on prior to admission back in January with no new stroke/TIA symptoms since being discharged.  They are supposed to receive his machine to check his INR at home tomorrow.  Wife states that he has had normal blood pressures every time they go to the doctor for visits but he does not have a cuff at home to check it.  He fell a couple of days ago due to tripping.  Wife states that he usually uses a walker at home and practices with a cane at his physical therapy appointments in which he continues with outpatient once a week.  He had a recent ER visit on 3/12 due to complaints of left leg swelling.  There was some concern for possible DVT but left lower extremity Doppler was negative therefore he was discharged home with instructions to follow-up with his PCP for BP control and management of his edema.  He denies any troubles with sleep or signs or symptoms of depression.  He eats and drinks appropriately.  He denies smoking.    Review of Systems:        Review of Systems   Constitutional: Negative.    HENT: Positive for hearing loss.    Eyes: Negative.    Respiratory: Negative.    Cardiovascular: Positive for leg swelling.   Gastrointestinal: Negative.    Endocrine: Negative.    Genitourinary: Negative.    Musculoskeletal: Negative.    Skin: Negative.    Allergic/Immunologic: Negative.    Neurological: Negative.    Hematological: Negative.    Psychiatric/Behavioral: Negative.        \"The following portions of the patient's history were reviewed and updated as appropriate: allergies, current medications, past family history, past medical history, past social history, past surgical history and problem list.\"    Review and Interpretation of Imaging as described in interval history.    Laboratory Results:             Lab Results   Component Value Date    HGBA1C 7.2 02/14/2020     Lab Results   Component Value Date    CHOL 154 " 01/14/2020     Lab Results   Component Value Date    HDL 34 (L) 01/14/2020    HDL 37 (L) 11/15/2019    HDL 34 (L) 11/01/2018     Lab Results   Component Value Date    LDL 88 01/14/2020    LDL 87 11/15/2019    LDL 93 11/01/2018     Lab Results   Component Value Date    TRIG 161 (H) 01/14/2020    TRIG 206 (H) 11/15/2019    TRIG 189 (H) 11/01/2018     No components found for: CHOLHDL  No results found for: RPR  Lab Results   Component Value Date    TSH 3.250 01/15/2020     Lab Results   Component Value Date    JCGSFTOS24 381 01/13/2020     Lab Results   Component Value Date    GLUCOSE 58 (L) 03/12/2020    BUN 35 (H) 03/12/2020    CREATININE 1.66 (H) 03/12/2020    EGFRIFNONA 40 (L) 03/12/2020    EGFRIFAFRI 49 (L) 11/15/2019    BCR 21.1 03/12/2020    K 4.2 03/12/2020    CO2 25.8 03/12/2020    CALCIUM 8.8 03/12/2020    PROTENTOTREF 7.0 11/15/2019    ALBUMIN 4.00 03/12/2020    LABIL2 1.7 11/15/2019    AST 26 03/12/2020    ALT 25 03/12/2020     Lab Results   Component Value Date    WBC 5.94 03/12/2020    HGB 14.4 03/12/2020    HCT 44.1 03/12/2020    MCV 86.1 03/12/2020     (L) 03/12/2020     Lab Results   Component Value Date    INR 2.7 (H) 03/19/2020    INR 2.43 (H) 03/12/2020    INR 2.9 (H) 03/05/2020    PROTIME 32.7 (H) 03/19/2020    PROTIME 26.1 (H) 03/12/2020    PROTIME 35.4 (H) 03/05/2020       Physical Examination:   mRS:   General Appearance:   Elderly pleasant male, well developed, well nourished, well groomed, alert, and cooperative.  HEENT: Normocephalic. Atraumatic.   Respiratory:   Even and unlabored.    Neurological examination:  Higher Integrative  Function: Oriented to time, place and person. Normal registration, recall, attention span and concentration. Normal language including comprehension, spontaneous speech, repetition, Normal fund of knowledge and higher integrative function.  CN II: Normal visual acuity and visual fields.    CN III IV VI: Extraocular movements are full without nystagmus.   CN  VII: Facial movements are symmetric. No weakness.  CN VIII:   Very Belkofski bilaterally  CN IX & X:   Symmetric palatal movement.  CN XI: Sternocleidomastoid and trapezius are normal.  No weakness.  CN XII:   The tongue is midline.  No atrophy or fasciculations.  Motor: Normal muscle strength in upper extremities, able to lift arms >10 seconds with no drift and pushed to stand to walker with no trouble. No abnormal movements.  Station and Gait: Normal gait mildly stooped posture, stood tall to walker and ambulated     Coordination: Finger to nose test shows no dysmetria.  Rapid alternating movements are normal.      Impression/Assessment:    Mr. Sequeira presented today via video visit for stroke follow-up.  He has been doing very well since being discharged from the hospital in January 2020 and has been maintained on Coumadin and aspirin with no new stroke/TIA symptoms.  His current INR goal is 3-3.5 and is being managed by Dr. Fried.  He has had no issues with bleeding being on both anticoagulant and antiplatelet therapy.  I think he can decrease his aspirin to 81 mg given he had no significant stenosis in his carotids and minimal small vessel disease on his MRI.  He should continue his statin.  Currently receiving PT once a week and after examining his ambulation he is doing very well and is much improved since being discharged from the hospital.  Encouraged him to continue with exercises even after being discharged from PT.  We discussed at length his personal risk factors for stroke and the importance of risk factor control for stroke prevention, including BP and cholesterol control.  He will monitor BP regularly and follow-up with PCP for continued cholesterol surveillance.  We discussed stroke/TIA symptoms and importance of calling 911 if he were to experience any of these.  Follow-up in 1 year, sooner if symptoms warrant.    Plan:     Continue Coumadin  Continue aspirin  Continue Lipitor  Monitor BP  regularly  Keep well-hydrated  Continue PT and encourage regular physical activity   Blood pressure control to <130/80   Goal LDL <70-recommend high dose statins-    Serum glucose < 140   Call 911 for stroke any stroke symptoms   Follow-up in 1 year, sooner if symptoms warrant.  Francisco was seen today for stroke.    Diagnoses and all orders for this visit:    Chronic atrial fibrillation    Cerebrovascular accident (CVA) due to embolism of right middle cerebral artery (CMS/HCC)        MDM  Reviewed: previous chart and vitals  Reviewed previous: labs, MRI, CT scan and ECG  Interpretation: labs, MRI and CT scan        LIZA Seo

## 2020-04-10 ENCOUNTER — TELEPHONE (OUTPATIENT)
Dept: NEUROLOGY | Facility: CLINIC | Age: 85
End: 2020-04-10

## 2020-04-10 LAB — INR PPP: 3

## 2020-04-10 NOTE — TELEPHONE ENCOUNTER
----- Message from LIZA Seo sent at 4/10/2020 10:42 AM EDT -----  Regarding: FW: CARLOS Russell, can you please call this patient and his wife and let them know that I spoke with Dr. Fried and we both agree that he does not need to be on ASA 325mg we will decrease him down to a baby ASA 81mg. I have d/c'd the 325mg and ordered the 81mg. He should take this daily.    Thanks,    Marilee

## 2020-04-10 NOTE — TELEPHONE ENCOUNTER
Spoke with patient's wife, Gladys.  Informed her that patient is d/c ASA 325mg and start taking ASA 81mg day.  Rx was sent to the pharmacy.

## 2020-04-13 ENCOUNTER — APPOINTMENT (OUTPATIENT)
Dept: PHYSICAL THERAPY | Facility: HOSPITAL | Age: 85
End: 2020-04-13

## 2020-04-13 ENCOUNTER — ANTICOAGULATION VISIT (OUTPATIENT)
Dept: PHARMACY | Facility: HOSPITAL | Age: 85
End: 2020-04-13

## 2020-04-13 DIAGNOSIS — I48.20 CHRONIC ATRIAL FIBRILLATION (HCC): ICD-10-CM

## 2020-04-13 DIAGNOSIS — Z95.2 HX OF AORTIC VALVE REPLACEMENT, MECHANICAL: ICD-10-CM

## 2020-04-13 NOTE — PROGRESS NOTES
Anticoagulation Clinic Progress Note    Anticoagulation Summary  As of 4/13/2020    INR goal:   3.0-3.5   TTR:   66.9 % (1.5 y)   INR used for dosing:   3.00 (4/10/2020)   Warfarin maintenance plan:   5 mg every Mon, Wed, Fri; 7.5 mg all other days   Weekly warfarin total:   45 mg   Plan last modified:   Vasquez Niño RP (3/5/2020)   Next INR check:   4/17/2020   Priority:   Maintenance   Target end date:   Indefinite    Indications    Atrial fibrillation (CMS/HCC) [I48.91]  Hx of aortic valve replacement  mechanical [Z95.2] [Z95.2]             Anticoagulation Episode Summary     INR check location:       Preferred lab:       Send INR reminders to:   [facility]    Comments:   New INR goal 3 - 3.5 (see 1/2020 hospitalization for CVA)      Anticoagulation Care Providers     Provider Role Specialty Phone number    Griffin Fried MD Referring Cardiology [phone]          Clinic Interview:  No pertinent clinical findings have been reported.    INR History:  Anticoagulation Monitoring 3/5/2020 3/19/2020 4/13/2020   INR 2.9 2.7 3.00   INR Date 3/5/2020 3/19/2020 4/10/2020   INR Goal 3.0-3.5 3.0-3.5 3.0-3.5   Trend Up Same Same   Last Week Total 42.5 mg 45 mg 45 mg   Next Week Total 45 mg 47.5 mg 45 mg   Sun 7.5 mg 7.5 mg -   Mon 5 mg 5 mg 5 mg   Tue 7.5 mg 7.5 mg 7.5 mg   Wed 5 mg 5 mg 5 mg   Thu 7.5 mg 10 mg (3/19); Otherwise 7.5 mg 7.5 mg   Fri 5 mg 5 mg -   Sat 7.5 mg 7.5 mg -   Visit Report - - -   Some recent data might be hidden       Plan:  1. INR is therapeutic 4/10- see above in Anticoagulation Summary.    Francisco Sequeira to continue their warfarin regimen- see above in Anticoagulation Summary.  2. Follow up in 1 week--per home test company  3. Left VM today to call with any issues or updates. They have been instructed to call if any changes in medications, doses, concerns, etc. Patient expresses understanding and has no further questions at this time.    Audra Vazquez [facility]

## 2020-04-14 ENCOUNTER — HOSPITAL ENCOUNTER (OUTPATIENT)
Dept: PHYSICAL THERAPY | Facility: HOSPITAL | Age: 85
Setting detail: THERAPIES SERIES
Discharge: HOME | End: 2020-04-14

## 2020-04-14 DIAGNOSIS — R26.89 FUNCTIONAL GAIT ABNORMALITY: ICD-10-CM

## 2020-04-14 DIAGNOSIS — I63.9 CEREBROVASCULAR ACCIDENT (CVA), UNSPECIFIED MECHANISM (HCC): Primary | ICD-10-CM

## 2020-04-14 PROCEDURE — 97112 NEUROMUSCULAR REEDUCATION: CPT

## 2020-04-14 PROCEDURE — 97530 THERAPEUTIC ACTIVITIES: CPT

## 2020-04-14 NOTE — THERAPY TREATMENT NOTE
"    Outpatient Physical Therapy Neuro Treatment Note  [facility]     Patient Name: Francisco Sequeira  : 1935  MRN: [id number]  Today's Date: 2020      Visit Date: 2020    Visit Dx:    ICD-10-CM ICD-9-CM   1. Cerebrovascular accident (CVA), unspecified mechanism (CMS/HCC) I63.9 434.91   2. Functional gait abnormality R26.89 781.2       Patient Active Problem List   Diagnosis   • Atrial fibrillation (CMS/HCC)   • Hypertension   • Atopic rhinitis   • Uncontrolled type 2 diabetes mellitus (CMS/HCC)   • Gastroesophageal reflux disease   • Hyperlipidemia   • Renal insufficiency   • Low testosterone   • Special screening for malignant neoplasm of prostate   • Hx of aortic valve replacement, mechanical [Z95.2]   • Screening for iron deficiency anemia   • Stroke-like symptom   • Kidney carcinoma (CMS/HCC)   • CKD (chronic kidney disease) stage 3, GFR 30-59 ml/min (CMS/HCC)   • BYRON (acute kidney injury) (CMS/HCC)   • Acute cerebral infarction (CMS/HCC)   • Cerebrovascular accident (CVA) due to embolism of right middle cerebral artery (CMS/HCC)           PT Neuro     Row Name 20 1300             Subjective Comments    Subjective Comments  \"No falls.\" \"I have been walking a little with my cane with my wife.\" \"That felt better.\" (referring to HHA provided on the L side with ascending and descending a curb)  -LB         Subjective Pain    Able to rate subjective pain?  yes  -LB      Pre-Treatment Pain Level  0  -LB      Post-Treatment Pain Level  0  -LB         Cognition    Overall Cognitive Status  WFL  -LB      Orientation Level  Oriented X4  -LB      Safety Judgment  Decreased awareness of need for safety  -LB      Deficits  Decreased awareness of deficits  -LB         Posture/Observations    Alignment Options  Forward head;Thoracic kyphosis;Lumbar lordosis  -LB      Forward Head  Moderate  -LB      Thoracic Kyphosis  Moderate  -LB      Lumbar lordosis  Decreased  -LB      Posture/Observations " Comments  Pt arrived to PT ambulating with his rolling walker independently.   -LB         Transfers    Sit-Stand Bowie (Transfers)  stand by assist;supervision from an armless chair   -LB      Stand-Sit [place]ie (Transfers)  stand by assist;supervision to an armless chair  -LB      Transfers, Sit-Stand-Sit, Assist Device  other (see comments) quad tipped cane   -LB      Transfer, Safety Issues  sequencing ability decreased noted when sitting at a chair at a table   -LB      Transfer, Impairments  impaired balance;postural control impaired  -LB         Gait/Stairs Assessment/Training    Bowie Level (Gait)  contact guard  -LB      Assistive Device (Gait)  other (see comments) quad tipped cane   -LB      Distance in Feet (Gait)  90' x 3 with quad tipped cane  -LB      Pattern (Gait)  step-through  -LB      Deviations/Abnormal Patterns (Gait)  gait speed decreased;stride length decreased  -LB      Left Sided Gait Deviations  weight shift ability decreased  -LB      Bowie Level (Stairs)  contact guard;minimum assist (75% patient effort) CGA 2 x, minimal HHA on the L 1 x   -LB      Assistive Device (Stairs)  other (see comments) quad tipped cane  -LB      Number of Steps (Stairs)  curb   -LB      Descending Technique (Stairs)  other (see comments) strong cues to lead with the L LE   -LB      Stairs, Safety Issues  weight-shifting ability decreased  -LB      Stairs, Impairments  impaired balance;coordination impaired  -LB      Comment (Gait/Stairs)  Pt ambulated ~ 20' x 2 with minimal HHA on the L on tile and then on concrete.   -LB         Balance Skills Training    Standing-Level of Assistance  Contact guard  -LB      Static Standing Balance Support  Right upper extremity supported;Left upper extremity supported;parallel bars light UE suppport  -LB      Standing-Balance Activities  Compliant surfaces;Feet together;Standing on foam roll  -LB      Gait Balance-Level of Assistance  Contact guard   "-LB      Gait Balance Support  assistive device rolling walker   -LB      Gait Balance Activities  scanning environment R/L  -LB        User Key  (r) = Recorded By, (t) = Taken By, (c) = Cosigned By    Initials Name Provider Type    Sandi Fink PT Physical Therapist                  PT Assessment/Plan     Row Name 04/14/20 1536          PT Assessment    Assessment Comments  Pt denied any falls since previous session. Pt demonstrated instability when descending a cur b with his quad tipped cane. Pt was instructed in L HHA and R UE holding his quad tipped cane on a curb. Pt demonstrated increased stability and reported he felt much safer with this technique.   -LB       User Key  (r) = Recorded By, (t) = Taken By, (c) = Cosigned By    Initials Name Provider Type    Sandi Fink PT Physical Therapist             OP Exercises     Row Name 04/14/20 1300             Subjective Comments    Subjective Comments  \"No falls.\" \"I have been walking a little with my cane with my wife.\" \"That felt better.\" (referring to HHA provided on the L side with ascending and descending a curb)  -LB         Subjective Pain    Able to rate subjective pain?  yes  -LB      Pre-Treatment Pain Level  0  -LB      Post-Treatment Pain Level  0  -LB         Exercise 5    Exercise Name 5  bilateral plantarflexion in standing   -LB      Cueing 5  Verbal  -LB      Reps 5  15  -LB        User Key  (r) = Recorded By, (t) = Taken By, (c) = Cosigned By    Initials Name Provider Type    Sandi Fink PT Physical Therapist                          Therapy Education  Education Details: Instructed pt and wife(wife at a distance observed) ambulation with quad tippedcane and minimal L HHA on level surfaces.   Given: Mobility training, Fall prevention and home safety  How Provided: Verbal, Demonstration  Provided to: Patient, Caregiver(Wife observed from a distance. )  Level of Understanding: Verbalized              Time Calculation:   Start " Time: 1300  Stop Time: 1332  Time Calculation (min): 32 min  Total Timed Code Minutes- PT: 32 minute(s)   Therapy Charges for Today     Code Description Service Date Service Provider Modifiers Qty    [id number]  PT NEUROMUSC RE EDUCATION EA 15 MIN 4/14/2020 Sandi Reza, PT GP 1    [id number]  PT THERAPEUTIC ACT EA 15 MIN 4/14/2020 Sandi Reza, PT GP 1                    Sandi Reza, PT  4/14/2020

## 2020-04-15 RX ORDER — METOPROLOL SUCCINATE 25 MG/1
TABLET, EXTENDED RELEASE ORAL
Qty: 90 TABLET | Refills: 2 | Status: SHIPPED | OUTPATIENT
Start: 2020-04-15 | End: 2021-01-07

## 2020-04-16 ENCOUNTER — APPOINTMENT (OUTPATIENT)
Dept: PHYSICAL THERAPY | Facility: HOSPITAL | Age: 85
End: 2020-04-16

## 2020-04-17 ENCOUNTER — ANTICOAGULATION VISIT (OUTPATIENT)
Dept: PHARMACY | Facility: HOSPITAL | Age: 85
End: 2020-04-17

## 2020-04-17 DIAGNOSIS — Z95.2 HX OF AORTIC VALVE REPLACEMENT, MECHANICAL: ICD-10-CM

## 2020-04-17 LAB — INR PPP: 3.6

## 2020-04-17 NOTE — PROGRESS NOTES
Anticoagulation Clinic Progress Note    Anticoagulation Summary  As of 4/17/2020    INR goal:   3.0-3.5   TTR:   67.1 % (1.5 y)   INR used for dosing:   3.60! (4/17/2020)   Warfarin maintenance plan:   5 mg every Mon, Wed, Fri; 7.5 mg all other days   Weekly warfarin total:   45 mg   No change documented:   Gabriela Weems RPH   Plan last modified:   Vasquez Niño RPH (3/5/2020)   Next INR check:   4/24/2020   Priority:   Maintenance   Target end date:   Indefinite    Indications    Atrial fibrillation (CMS/HCC) [I48.91]  Hx of aortic valve replacement  mechanical [Z95.2] [Z95.2]             Anticoagulation Episode Summary     INR check location:       Preferred lab:       Send INR reminders to:    [facility]    Comments:   New INR goal 3 - 3.5 (see 1/2020 hospitalization for CVA)      Anticoagulation Care Providers     Provider Role Specialty Phone number    Griffin Fried MD Referring Cardiology [phone]          Clinic Interview:  Patient Findings     Negatives:   Signs/symptoms of thrombosis, Signs/symptoms of bleeding,   Laboratory test error suspected, Change in health, Change in alcohol use,   Change in activity, Upcoming invasive procedure, Emergency department   visit, Upcoming dental procedure, Missed doses, Extra doses, Change in   medications, Change in diet/appetite, Hospital admission, Bruising, Other   complaints      Clinical Outcomes     Negatives:   Major bleeding event, Thromboembolic event,   Anticoagulation-related hospital admission, Anticoagulation-related ED   visit, Anticoagulation-related fatality        INR History:  Anticoagulation Monitoring 3/19/2020 4/13/2020 4/17/2020   INR 2.7 3.00 3.60   INR Date 3/19/2020 4/10/2020 4/17/2020   INR Goal 3.0-3.5 3.0-3.5 3.0-3.5   Trend Same Same Same   Last Week Total 45 mg 45 mg 45 mg   Next Week Total 47.5 mg 45 mg 45 mg   Sun 7.5 mg - 7.5 mg   Mon 5 mg 5 mg 5 mg   Tue 7.5 mg 7.5 mg 7.5 mg   Wed 5 mg 5 mg 5 mg   Thu 10 mg  (3/19); Otherwise 7.5 mg 7.5 mg 7.5 mg   Fri 5 mg - 5 mg   Sat 7.5 mg - 7.5 mg   Visit Report - - -   Some recent data might be hidden       Plan:  1. INR is Supratherapeutic today- see above in Anticoagulation Summary.   Will instruct Francisco Sequeira to Continue their warfarin regimen- see above in Anticoagulation Summary.  2. Follow up in 1 week  3. They have been instructed to call if any changes in medications, doses, concerns, etc.    Gabriela Weems [facility]

## 2020-04-20 ENCOUNTER — APPOINTMENT (OUTPATIENT)
Dept: PHYSICAL THERAPY | Facility: HOSPITAL | Age: 85
End: 2020-04-20

## 2020-04-21 ENCOUNTER — HOSPITAL ENCOUNTER (OUTPATIENT)
Dept: PHYSICAL THERAPY | Facility: HOSPITAL | Age: 85
Setting detail: THERAPIES SERIES
Discharge: HOME | End: 2020-04-21

## 2020-04-21 DIAGNOSIS — I63.9 CEREBROVASCULAR ACCIDENT (CVA), UNSPECIFIED MECHANISM (HCC): Primary | ICD-10-CM

## 2020-04-21 DIAGNOSIS — R26.89 FUNCTIONAL GAIT ABNORMALITY: ICD-10-CM

## 2020-04-21 PROCEDURE — 97112 NEUROMUSCULAR REEDUCATION: CPT

## 2020-04-21 PROCEDURE — 97530 THERAPEUTIC ACTIVITIES: CPT

## 2020-04-21 RX ORDER — ATORVASTATIN CALCIUM 20 MG/1
TABLET, FILM COATED ORAL
Qty: 90 TABLET | Refills: 0 | Status: SHIPPED | OUTPATIENT
Start: 2020-04-21 | End: 2020-05-15

## 2020-04-21 NOTE — THERAPY PROGRESS REPORT/RE-CERT
"    Outpatient Physical Therapy Neuro Progress Note  [facility]     Patient Name: Francisco Sequeira  : 1935  MRN: [id number]  Today's Date: 2020      Visit Date: 2020    Visit Dx:    ICD-10-CM ICD-9-CM   1. Cerebrovascular accident (CVA), unspecified mechanism (CMS/HCC) I63.9 434.91   2. Functional gait abnormality R26.89 781.2       Patient Active Problem List   Diagnosis   • Atrial fibrillation (CMS/HCC)   • Hypertension   • Atopic rhinitis   • Uncontrolled type 2 diabetes mellitus (CMS/HCC)   • Gastroesophageal reflux disease   • Hyperlipidemia   • Renal insufficiency   • Low testosterone   • Special screening for malignant neoplasm of prostate   • Hx of aortic valve replacement, mechanical [Z95.2]   • Screening for iron deficiency anemia   • Stroke-like symptom   • Kidney carcinoma (CMS/HCC)   • CKD (chronic kidney disease) stage 3, GFR 30-59 ml/min (CMS/HCC)   • BYRON (acute kidney injury) (CMS/HCC)   • Acute cerebral infarction (CMS/HCC)   • Cerebrovascular accident (CVA) due to embolism of right middle cerebral artery (CMS/HCC)           PT Neuro     Row Name 20 1255             Subjective Comments    Subjective Comments  \"I have been practicing with my cane some with my wife. Pt reporting he is eager to learn to use the cane. Pt denied any falls.   -LB         Subjective Pain    Able to rate subjective pain?  yes  -LB      Pre-Treatment Pain Level  0  -LB      Post-Treatment Pain Level  0  -LB         Cognition    Overall Cognitive Status  WFL  -LB      Orientation Level  Oriented X4  -LB      Safety Judgment  Decreased awareness of need for safety  -LB      Deficits  Decreased awareness of deficits  -LB         Posture/Observations    Alignment Options  Forward head;Thoracic kyphosis;Lumbar lordosis  -LB      Forward Head  Moderate  -LB      Thoracic Kyphosis  Moderate  -LB      Lumbar lordosis  Decreased  -LB         MMT Left Lower Ext    Lt Hip Flexion MMT, Gross Movement  () " normal  -LB      Lt Hip ABduction MMT, Gross Movement  (4+/5) good plus  -LB         Bed Mobility Assessment/Treatment    Rolling Left [place]at (Bed Mobility)  independent  -LB      Rolling Right [place] (Bed Mobility)  independent  -LB      Supine-Sit [place] (Bed Mobility)  independent  -LB      Sit-Supine [place] (Bed Mobility)  independent  -LB         Transfers    Sit-Stand [place] (Transfers)  stand by assist;supervision  -LB      Stand-Sit [place] (Transfers)  stand by assist;supervision  -LB      Transfers, Sit-Stand-Sit, Assist Device  other (see comments) no device SBA, quad tipped cane supervision  -LB      Transfer, Safety Issues  sequencing ability decreased  -LB      Transfer, Impairments  impaired balance;postural control impaired  -LB      Comment (Transfers)  Pt comes to stand from an armless chair conditional independent with his rolling walker. FLOOR transfers with minimal of 1.    -LB         Gait/Stairs Assessment/Training    [place] Level (Gait)  contact guard  -LB      Assistive Device (Gait)  other (see comments) quad tipped cane  -LB      Distance in Feet (Gait)  100'   -LB      Pattern (Gait)  step-through  -LB      Deviations/Abnormal Patterns (Gait)  gait speed decreased;stride length decreased  -LB      Bilateral Gait Deviations  forward flexed posture slightly   -LB      Left Sided Gait Deviations  weight shift ability decreased  -LB      [place] Level (Stairs)  stand by assist  -LB      Assistive Device (Stairs)  other (see comments) no device  -LB      Handrail Location (Stairs)  right side (ascending)  -LB      Number of Steps (Stairs)  4  -LB      Ascending Technique (Stairs)  step-to-step  -LB      Descending Technique (Stairs)  step-to-step  -LB      Stairs, Safety Issues  weight-shifting ability decreased  -LB      Stairs, Impairments  impaired balance;coordination impaired  -LB      Comment (Gait/Stairs)  CURB--- with quad tipped cane with  "CGA ascending, min descending as L heel caught on edge of step.  See Dynamic Gait Index   -LB         Balance Skills Training    Balance Comments  see SEVILLA and Tinetti   -LB        User Key  (r) = Recorded By, (t) = Taken By, (c) = Cosigned By    Initials Name Provider Type    Sandi Fink, PT Physical Therapist                  PT Assessment/Plan     Row Name 04/21/20 1605          PT Assessment    Assessment Comments  Pt reported one fall in the last month. Pt was going to sit in an armless chair positioned at the kitchen table. PT worked on an alternate technique and pt demonstrated good technique with stability. Pt demonstrated improvement with transfers, gait and balane as indicated by the ERG, Tinetti and DGI. Pt's score on the DGI was 15/24 which indicates risk for falls. Pt would benefit from continued PT with emphasis on gait with quad tipped cane and balance.   -LB        PT Plan    PT Frequency  1x/week;2x/week  -LB     Predicted Duration of Therapy Intervention (Therapy Eval)  4 weeks   -LB       User Key  (r) = Recorded By, (t) = Taken By, (c) = Cosigned By    Initials Name Provider Type    Sandi Fink, PT Physical Therapist             OP Exercises     Row Name 04/21/20 1259             Subjective Comments    Subjective Comments  \"I have been practicing with my cane some with my wife. Pt reporting he is eager to learn to use the cane. Pt denied any falls.   -LB         Subjective Pain    Able to rate subjective pain?  yes  -LB      Pre-Treatment Pain Level  0  -LB      Post-Treatment Pain Level  0  -LB        User Key  (r) = Recorded By, (t) = Taken By, (c) = Cosigned By    Initials Name Provider Type    Sandi Fink PT Physical Therapist                      PT OP Goals     Row Name 04/21/20 1255          PT Short Term Goals    STG 1  Pt to come to stand from an armless chair to his walker conditional independent.   -LB     STG 1 Progress  Met  -LB     STG 2  Pt to perform sit to " stand and stand to sit with increased weight shift over his LE's.   -LB     STG 2 Progress  Met  -LB     STG 3  Pt to ambulate with decreased increased left toe clearance.   -LB     STG 3 Progress  Met  -LB     STG 4  Pt to improve score on the SEVILLA to 36/56 to improve balance and reduce risk of falls.  -LB     STG 4 Progress  Met  -LB     STG 4 Progress Comments  Pt scored a 41/56.   -LB     STG 5  Pt to improve score on the Dynamic Gait Index to 12/24 to improve balance and reduce risk of falls.   -LB     STG 5 Progress  Met  -LB     STG 5 Progress Comments  Pt scored a 15/24.   -LB     STG 6  Pt to ascend and descend 4 steps with one rail step to step with CGA with correct sequencing.   -LB     STG 6 Progress  Met  -LB     STG 7  Pt to perform floor transfers with UE support with minimal of 1.   -LB     STG 7 Progress  Met  -LB     STG 8  Pt to ambulate ~ 250' with rolling walker with left heelstrike and stride length.   -LB     STG 8 Progress  Met  -LB        Long Term Goals    LTG Date to Achieve  05/20/20  -LB     LTG 1  Pt to come to stand from an armless chair with least restricitve device conditional independent.   -LB     LTG 1 Progress  Progressing;Ongoing  -LB     LTG 2  Pt to complete the TUG in < 15 seconds while ambulating with least restrictive device.   -LB     LTG 2 Progress  Progressing;Ongoing  -LB     LTG 3  Pt to increase strength left hip flexors and abductors to 5/5.   -LB     LTG 4  Pt to improve score on the SEVILLA to 41/56 to improve balance and reduce risk of falls.  -LB     LTG 4 Progress  Met  -LB     LTG 5  Pt to improve score on the Dynamic Gait Index to 17/24 to improve balance and reduce risk of falls.  -LB     LTG 5 Progress  Progressing;Ongoing  -LB     LTG 6  Pt to ascend and descend 3 steps with least restrictive device with SBA.   -LB     LTG 6 Progress  Ongoing  -LB     LTG 7  Pt to perform floor transfers with UE support with SBA to conditional independent.   -LB     LTG 7  Progress  Goal Revised  -LB     LTG 7 Progress Comments  Pt to perform floor transfers with CGA to minimal of 1.   -LB     LTG 8  Pt to ambulate >500' with least restricitve device on level surfaces conditional independent.   -LB     LTG 8 Progress  Progressing;Ongoing  -LB     LTG 9  Pt to ascend and descend a curb with quad tipped cane with CGA.   -LB     LTG 9 Progress  New  -LB        Time Calculation    PT Goal Re-Cert Due Date  05/20/20  -LB       User Key  (r) = Recorded By, (t) = Taken By, (c) = Cosigned By    Initials Name Provider Type    Sandi Fink, PT Physical Therapist          Therapy Education  Education Details: Floor transfers R UE support.   Given: Fall prevention and home safety, Mobility training  How Provided: Verbal, Demonstration  Provided to: Patient  Level of Understanding: Teach back education performed, Verbalized, Demonstrated    Harp Balance Scale  Sitting to Standing: able to stand independently using hands  Standing Unsupported: able to stand safely for 2 minutes  Sitting with Back Unsupported but Feet Supported on Floor or on Stool: able to sit safely and securely for 2 minutes  Standing to Sitting: sits safely with minimal use of hands  Transfers: able to transfer safely definite need of hands  Standing Unsupported with Eyes Closed: able to stand 10 seconds with supervision  Standing Unsupported with Feet Together: able to place feet together independently and stand 1 minute with supervision  Reaching Forward with Outstretched Arm While Standing: can reach forward confidently 25 cm (10 inches)   Object From the Floor From a Standing Position: able to  object but needs supervision  Turning to Look Behind Over Left and Right Shoulders While Standing: looks behind from both sides and weight shifts well  Turn 360 Degrees: able to turn 360 degrees safely but slowly  Place Alternate Foot on Step or Stool While Standing Unsupported: able to complete 4 steps without aid  with supervision  Standing Unsupported with One Foot in Front: able to take small step independently and hold 30 seconds  Standing on One Leg: unable to try of needs assist to prevent fall  Harp Total Score: 41  Dynamic Gait Index (DGI)  Gait Level Surface: Mild impairment: walks 20’, uses assistive devices, slower speed, mild gait deviations  Change in Gait Speed: Mild impairment: Is able to change speed but demonstrates mild gait deviations, or no gait deviations but unable to achieve a significant change in velocity, or uses as assistive device  Gait with Horizontal Head Turns: Mild impairment: Performs head turns smoothly with slight change in gait velocity, i.e. minor disruption to smooth gait path or uses walking aid.  Gait with Vertical Head Turns: Mild impairment: Performs head turns smoothly with slight change in gait velocity, i.e. minor disruption to smooth gait path or uses walking aid.  Gait and Pivot Turn: Mild impairment: pivot turns safely in >3 seconds and stops with no loss of balance.  Step Over Obstacle: Mild impairment: Is able to step over shoe box, but must slow down and adjust steps to clear box safely.  Step Around Obstacles: Mild impairment: Is able to step around both cones, but must slow down and adjust steps to clear cones.  Steps: Moderate impairment: Two feet to a stair, must use rail.  Dynamic Gait Index Score: 15  Dynamic Gait Index Comments: with rolling walker, high fall risk   Tinetti Assessment  Tinetti Assessment: yes  Sitting Balance: Steady,safe  Arises: Able, uses arms  Attempts to Rise: Able in 1 attempt  Immediate Standing Balance (first 5 sec): Steady without support  Standing Balance: Narrow stance, without support  Sternal Nudge (feet close together): Stagger, grabs, catches self  Eyes Closed (feet close together): Steady  Turning 360 Degrees- Steps: Continuous steps  Turning 360 Degrees- Steadiness: Steady  Sitting Down: Uses arms or not a smooth motion  Tinetti Balance  "Score: 13  Gait Initiation (immediate after told \"go\"): No hesitancy  Step Length- Right Swing: Right swing foot passes Left stance leg  Step Length- Left Swing: Left swing foot passes Right  Foot Clearance- Right Foot: Right foot completely clears floor  Foot Clearance- Left Foot: Left foot completely clears floor  Step Symmetry: Right and Left step length equal  Step Continuity: Steps appear continuous  Path (excursion): Mild/moderate deviation or use of aid  Trunk: Marked sway or uses device  Base of Support: Heels close while walking  Gait Score: 9  Tinetti Total Score: 22  Tinetti Assistive Device: rolling walker      Time Calculation:   Start Time: 1255  Stop Time: 1336  Time Calculation (min): 41 min  Total Timed Code Minutes- PT: 41 minute(s)   Therapy Charges for Today     Code Description Service Date Service Provider Modifiers Qty    [id number]  PT NEUROMUSC RE EDUCATION EA 15 MIN 4/21/2020 Sandi Reza, PT GP 2    [id number]  PT THERAPEUTIC ACT EA 15 MIN 4/21/2020 Sandi Reza, PT GP 1          PT G-Codes  Harp Total Score: 41  Tinetti Total Score: 22         Sandi Reza, PT  4/21/2020     "

## 2020-04-23 ENCOUNTER — APPOINTMENT (OUTPATIENT)
Dept: PHYSICAL THERAPY | Facility: HOSPITAL | Age: 85
End: 2020-04-23

## 2020-04-24 ENCOUNTER — ANTICOAGULATION VISIT (OUTPATIENT)
Dept: PHARMACY | Facility: HOSPITAL | Age: 85
End: 2020-04-24

## 2020-04-24 DIAGNOSIS — Z95.2 HX OF AORTIC VALVE REPLACEMENT, MECHANICAL: ICD-10-CM

## 2020-04-24 LAB — INR PPP: 3.5

## 2020-04-24 NOTE — PROGRESS NOTES
Anticoagulation Clinic Progress Note    Anticoagulation Summary  As of 4/24/2020    INR goal:   3.0-3.5   TTR:   66.3 % (1.5 y)   INR used for dosing:   3.50 (4/24/2020)   Warfarin maintenance plan:   5 mg every Mon, Wed, Fri; 7.5 mg all other days   Weekly warfarin total:   45 mg   Plan last modified:   Vasquez Niño RPH (3/5/2020)   Next INR check:   5/1/2020   Priority:   Maintenance   Target end date:   Indefinite    Indications    Atrial fibrillation (CMS/HCC) [I48.91]  Hx of aortic valve replacement  mechanical [Z95.2] [Z95.2]             Anticoagulation Episode Summary     INR check location:       Preferred lab:       Send INR reminders to:   [facility]    Comments:   New INR goal 3 - 3.5 (see 1/2020 hospitalization for CVA)      Anticoagulation Care Providers     Provider Role Specialty Phone number    Griffin Fried MD Referring Cardiology [phone]          Clinic Interview:  No pertinent clinical findings have been reported.    INR History:  Anticoagulation Monitoring 4/13/2020 4/17/2020 4/24/2020   INR 3.00 3.60 3.50   INR Date 4/10/2020 4/17/2020 4/24/2020   INR Goal 3.0-3.5 3.0-3.5 3.0-3.5   Trend Same Same Same   Last Week Total 45 mg 45 mg 45 mg   Next Week Total 45 mg 45 mg 45 mg   Sun - 7.5 mg 7.5 mg   Mon 5 mg 5 mg 5 mg   Tue 7.5 mg 7.5 mg 7.5 mg   Wed 5 mg 5 mg 5 mg   Thu 7.5 mg 7.5 mg 7.5 mg   Fri - 5 mg 5 mg   Sat - 7.5 mg 7.5 mg   Visit Report - - -   Some recent data might be hidden       Plan:  1. INR is therapeutic today- see above in Anticoagulation Summary.    Francisco Sequeira to continue their warfarin regimen- see above in Anticoagulation Summary.  2. Follow up in 1 week as home marisa.  3. Spoke with family today. They have been instructed to call if any changes in medications, doses, concerns, etc. Patient expresses understanding and has no further questions at this time.    Audra Vazquez [facility]

## 2020-04-27 ENCOUNTER — APPOINTMENT (OUTPATIENT)
Dept: PHYSICAL THERAPY | Facility: HOSPITAL | Age: 85
End: 2020-04-27

## 2020-04-28 ENCOUNTER — HOSPITAL ENCOUNTER (OUTPATIENT)
Dept: PHYSICAL THERAPY | Facility: HOSPITAL | Age: 85
Setting detail: THERAPIES SERIES
Discharge: HOME | End: 2020-04-28

## 2020-04-28 DIAGNOSIS — R26.89 FUNCTIONAL GAIT ABNORMALITY: ICD-10-CM

## 2020-04-28 DIAGNOSIS — I63.9 CEREBROVASCULAR ACCIDENT (CVA), UNSPECIFIED MECHANISM (HCC): Primary | ICD-10-CM

## 2020-04-28 DIAGNOSIS — L08.9 SOFT TISSUE INFECTION: ICD-10-CM

## 2020-04-28 PROCEDURE — 97112 NEUROMUSCULAR REEDUCATION: CPT

## 2020-04-28 NOTE — THERAPY TREATMENT NOTE
"    Outpatient Physical Therapy Neuro Treatment Note  [facility]     Patient Name: Francisco Sequeira  : 1935  MRN: [id number]  Today's Date: 2020      Visit Date: 2020    Visit Dx:    ICD-10-CM ICD-9-CM   1. Cerebrovascular accident (CVA), unspecified mechanism (CMS/HCC) I63.9 434.91   2. Functional gait abnormality R26.89 781.2       Patient Active Problem List   Diagnosis   • Atrial fibrillation (CMS/HCC)   • Hypertension   • Atopic rhinitis   • Uncontrolled type 2 diabetes mellitus (CMS/HCC)   • Gastroesophageal reflux disease   • Hyperlipidemia   • Renal insufficiency   • Low testosterone   • Special screening for malignant neoplasm of prostate   • Hx of aortic valve replacement, mechanical [Z95.2]   • Screening for iron deficiency anemia   • Stroke-like symptom   • Kidney carcinoma (CMS/HCC)   • CKD (chronic kidney disease) stage 3, GFR 30-59 ml/min (CMS/HCC)   • BYRON (acute kidney injury) (CMS/HCC)   • Acute cerebral infarction (CMS/HCC)   • Cerebrovascular accident (CVA) due to embolism of right middle cerebral artery (CMS/HCC)           PT Neuro     Row Name 20 1300             Subjective Comments    Subjective Comments  Pt reports he has been practicing with the cane with his wife. Pt reports, \"My wife said I should ask you about driving.\" PT talked with pt and wife about driving when pt was picked up from PT refer to education. Pt and wife denied any falls.   -LB         Subjective Pain    Able to rate subjective pain?  yes  -LB      Pre-Treatment Pain Level  0  -LB      Post-Treatment Pain Level  0  -LB         Posture/Observations    Alignment Options  Forward head;Thoracic kyphosis;Lumbar lordosis  -LB      Forward Head  Moderate  -LB      Thoracic Kyphosis  Moderate  -LB      Lumbar lordosis  Decreased  -LB         Transfers    Sit-Stand [place] (Transfers)  stand by assist;verbal cues when going to sit at a chair positioned a table  -LB      Stand-Sit [place] " (Transfers)  stand by assist;verbal cues when coming to stand from a chair positioned at a  table  -LB      Transfers, Sit-Stand-Sit, Assist Device  other (see comments) to his quad tiupped cane   -LB      Transfer, Safety Issues  sequencing ability decreased  -LB      Transfer, Impairments  impaired balance;postural control impaired  -LB      Comment (Transfers)  Less cues for technique with to sit in a chiar positioned at a table   -LB         Gait/Stairs Assessment/Training    [place] Level (Gait)  contact guard  -LB      Assistive Device (Gait)  other (see comments) quad tipped cane   -LB      Distance in Feet (Gait)  120' x 2 in closer quarters and around obstacles, at end of session 100' on concrete  -LB      Pattern (Gait)  step-through  -LB      Deviations/Abnormal Patterns (Gait)  gait speed decreased;stride length decreased  -LB      Bilateral Gait Deviations  forward flexed posture slightly   -LB      Left Sided Gait Deviations  weight shift ability decreased  -LB      [place] Level (Stairs)  not tested  -LB         Balance Skills Training    Standing-Level of Assistance  Contact guard  -LB      Static Standing Balance Support  Right upper extremity supported;Left upper extremity supported;parallel bars  -LB      Standing-Balance Activities  Compliant surfaces;Feet together foam disc and square foam   -LB        User Key  (r) = Recorded By, (t) = Taken By, (c) = Cosigned By    Initials Name Provider Type    Sandi Fink, PT Physical Therapist                  PT Assessment/Plan     Row Name 04/28/20 1800          PT Assessment    Assessment Comments  Pt asking about driving. PT discussed in detail with pt and his wife he needed to consult with his primary care physician or his neurologist regarding returning to driving. Pt eager to advance to his quad tipped cane.   -LB       User Key  (r) = Recorded By, (t) = Taken By, (c) = Cosigned By    Initials Name Provider Type    Sandi Fink,  "PT Physical Therapist             OP Exercises     Row Name 04/28/20 1300             Subjective Comments    Subjective Comments  Pt reports he has been practicing with the cane with his wife. Pt reports, \"My wife said I should ask you about driving.\" PT talked with pt and wife about driving when pt was picked up from PT refer to education. Pt and wife denied any falls.   -LB         Subjective Pain    Able to rate subjective pain?  yes  -LB      Pre-Treatment Pain Level  0  -LB      Post-Treatment Pain Level  0  -LB         Exercise 5    Exercise Name 5  bilateral plantarflexion in standing   -LB      Cueing 5  Verbal  -LB      Reps 5  15  -LB        User Key  (r) = Recorded By, (t) = Taken By, (c) = Cosigned By    Initials Name Provider Type    LB Sandi Reza, PT Physical Therapist                          Therapy Education  Education Details: Discussed in detail with pt and wife that pt was most likely advised not to drive at time of discharge from the hospital and that he needs to get the OK to drive from his neurologist or his primary care physician. Recommended pt continue to walk with ambulation with his wife with the cane.   Given: Mobility training, Fall prevention and home safety, Symptoms/condition management  How Provided: Verbal  Provided to: Patient  Level of Understanding: Teach back education performed, Verbalized, Demonstrated              Time Calculation:   Start Time: 1300  Stop Time: 1335  Time Calculation (min): 35 min  Total Timed Code Minutes- PT: 35 minute(s)   Therapy Charges for Today     Code Description Service Date Service Provider Modifiers Qty    [id number]  PT NEUROMUSC RE EDUCATION EA 15 MIN 4/28/2020 Sandi Reza, PT GP 2                    Sandi Reza, PT  4/28/2020     "

## 2020-05-01 ENCOUNTER — ANTICOAGULATION VISIT (OUTPATIENT)
Dept: PHARMACY | Facility: HOSPITAL | Age: 85
End: 2020-05-01

## 2020-05-01 DIAGNOSIS — Z95.2 HX OF AORTIC VALVE REPLACEMENT, MECHANICAL: ICD-10-CM

## 2020-05-01 LAB — INR PPP: 2.6

## 2020-05-01 NOTE — PROGRESS NOTES
Anticoagulation Clinic Progress Note    Anticoagulation Summary  As of 2020    INR goal:   3.0-3.5   TTR:   66.1 % (1.6 y)   INR used for dosin.60! (2020)   Warfarin maintenance plan:   5 mg every Mon, Wed, Fri; 7.5 mg all other days   Weekly warfarin total:   45 mg   Plan last modified:   Gabriela Weems RPH (2020)   Next INR check:   2020   Priority:   Maintenance   Target end date:   Indefinite    Indications    Atrial fibrillation (CMS/HCC) [I48.91]  Hx of aortic valve replacement  mechanical [Z95.2] [Z95.2]             Anticoagulation Episode Summary     INR check location:       Preferred lab:       Send INR reminders to:    [facility]    Comments:   New INR goal 3 - 3.5 (see 2020 hospitalization for CVA)      Anticoagulation Care Providers     Provider Role Specialty Phone number    Griffin Fried MD Referring Cardiology [phone]          Clinic Interview:  Patient Findings     Negatives:   Signs/symptoms of thrombosis, Signs/symptoms of bleeding,   Laboratory test error suspected, Change in health, Change in alcohol use,   Change in activity, Upcoming invasive procedure, Emergency department   visit, Upcoming dental procedure, Missed doses, Extra doses, Change in   medications, Change in diet/appetite, Hospital admission, Bruising, Other   complaints      Clinical Outcomes     Negatives:   Major bleeding event, Thromboembolic event,   Anticoagulation-related hospital admission, Anticoagulation-related ED   visit, Anticoagulation-related fatality        INR History:  Anticoagulation Monitoring 2020   INR 3.60 3.50 2.60   INR Date 2020   INR Goal 3.0-3.5 3.0-3.5 3.0-3.5   Trend Same Same Same   Last Week Total 45 mg 45 mg 45 mg   Next Week Total 45 mg 45 mg 47.5 mg   Sun 7.5 mg 7.5 mg 7.5 mg   Mon 5 mg 5 mg 5 mg   Tue 7.5 mg 7.5 mg 7.5 mg   Wed 5 mg 5 mg 5 mg   Thu 7.5 mg 7.5 mg 7.5 mg   Fri 5 mg 5 mg 7.5 mg ()    Sat 7.5 mg 7.5 mg 7.5 mg   Visit Report - - -   Some recent data might be hidden       Plan:  1. INR is Subtherapeutic today- see above in Anticoagulation Summary.   Will instruct Francisco Sequeira to boost today with 7.5mg and resume their warfarin regimen- see above in Anticoagulation Summary.  2. Follow up in 1 week  3. They have been instructed to call if any changes in medications, doses, concerns, etc. Patient expresses understanding and has no further questions at this time.    Gabriela Weems [facility]

## 2020-05-05 ENCOUNTER — APPOINTMENT (OUTPATIENT)
Dept: PHYSICAL THERAPY | Facility: HOSPITAL | Age: 85
End: 2020-05-05

## 2020-05-07 ENCOUNTER — HOSPITAL ENCOUNTER (OUTPATIENT)
Dept: PHYSICAL THERAPY | Facility: HOSPITAL | Age: 85
Setting detail: THERAPIES SERIES
Discharge: HOME | End: 2020-05-07

## 2020-05-07 DIAGNOSIS — R26.89 FUNCTIONAL GAIT ABNORMALITY: ICD-10-CM

## 2020-05-07 DIAGNOSIS — I63.9 CEREBROVASCULAR ACCIDENT (CVA), UNSPECIFIED MECHANISM (HCC): Primary | ICD-10-CM

## 2020-05-07 PROCEDURE — 97530 THERAPEUTIC ACTIVITIES: CPT

## 2020-05-07 PROCEDURE — 97112 NEUROMUSCULAR REEDUCATION: CPT

## 2020-05-07 NOTE — THERAPY TREATMENT NOTE
"    Outpatient Physical Therapy Neuro Treatment Note  [facility]     Patient Name: Francisco Sequeira  : 1935  MRN: [id number]  Today's Date: 2020      Visit Date: 2020    Visit Dx:    ICD-10-CM ICD-9-CM   1. Cerebrovascular accident (CVA), unspecified mechanism (CMS/HCC) I63.9 434.91   2. Functional gait abnormality R26.89 781.2       Patient Active Problem List   Diagnosis   • Atrial fibrillation (CMS/HCC)   • Hypertension   • Atopic rhinitis   • Uncontrolled type 2 diabetes mellitus (CMS/HCC)   • Gastroesophageal reflux disease   • Hyperlipidemia   • Renal insufficiency   • Low testosterone   • Special screening for malignant neoplasm of prostate   • Hx of aortic valve replacement, mechanical [Z95.2]   • Screening for iron deficiency anemia   • Stroke-like symptom   • Kidney carcinoma (CMS/HCC)   • CKD (chronic kidney disease) stage 3, GFR 30-59 ml/min (CMS/HCC)   • BYRON (acute kidney injury) (CMS/HCC)   • Acute cerebral infarction (CMS/HCC)   • Cerebrovascular accident (CVA) due to embolism of right middle cerebral artery (CMS/HCC)           PT Neuro     Row Name 20 1345             Subjective Comments    Subjective Comments  \"I have been walking with my cane more.\"   -LB         Subjective Pain    Able to rate subjective pain?  yes  -LB      Pre-Treatment Pain Level  0  -LB      Post-Treatment Pain Level  0  -LB         Cognition    Overall Cognitive Status  WFL  -LB      Memory  Appears intact  -LB      Orientation Level  Oriented X4  -LB      Deficits  -- improved awareness of safety for ambulation  -LB      Comments  Pt reporting that he has been walking with the cane with his wife next to him or watching.   -LB         Posture/Observations    Alignment Options  Forward head;Thoracic kyphosis;Lumbar lordosis  -LB      Forward Head  Moderate  -LB      Thoracic Kyphosis  Moderate  -LB      Lumbar lordosis  Decreased  -LB         Transfers    Sit-Stand [place] (Transfers)  " "supervision  -LB      Stand-Sit [place] (Transfers)  supervision  -LB      Transfers, Sit-Stand-Sit, Assist Device  other (see comments) quad tipped cane  -LB      Transfer, Impairments  impaired balance;postural control impaired  -LB      Comment (Transfers)  To and from an armed chair and a [place] type chair.   -LB         Gait/Stairs Assessment/Training    [place] Level (Gait)  stand by assist;contact guard  -LB      Assistive Device (Gait)  other (see comments) quad tipped cane  -LB      Distance in Feet (Gait)  150' x 2, 250'   -LB      Pattern (Gait)  step-through  -LB      Deviations/Abnormal Patterns (Gait)  gait speed decreased;stride length decreased  -LB      Bilateral Gait Deviations  forward flexed posture slightly   -LB      Left Sided Gait Deviations  weight shift ability decreased  -LB      [place] Level (Stairs)  contact guard  -LB      Assistive Device (Stairs)  other (see comments) quad tipped cane   -LB      Number of Steps (Stairs)  curb  2 x   -LB      Comment (Gait/Stairs)  Pt ambulated through doorways, thresholds and on large rug with quad tipped cane with good stability.  CAR TRANSFERS -- into pt's [place] truck with CGA and strong cues to sit down and then place LE's into the car.   -LB         Balance Skills Training    Standing-Level of Assistance  Contact guard  -LB      Static Standing Balance Support  parallel bars;Right upper extremity supported;Left upper extremity supported light U\"E support  -LB      Standing-Balance Activities  Standing on 1/2 roll;Compliant surfaces;Feet together  -LB        User Key  (r) = Recorded By, (t) = Taken By, (c) = Cosigned By    Initials Name Provider Type    Sandi Fink PT Physical Therapist                  PT Assessment/Plan     Row Name 05/07/20 1002          PT Assessment    Assessment Comments  Pt demonstrated consistent wt shift forward over LE's when coming to stand. Pt demonstrated no loss of balance while amublating " "with the quad tipped cane during PT session.   -LB       User Key  (r) = Recorded By, (t) = Taken By, (c) = Cosigned By    Initials Name Provider Type    Sandi Fink PT Physical Therapist             OP Exercises     Row Name 05/07/20 1345             Subjective Comments    Subjective Comments  \"I have been walking with my cane more.\"   -LB         Subjective Pain    Able to rate subjective pain?  yes  -LB      Pre-Treatment Pain Level  0  -LB      Post-Treatment Pain Level  0  -LB         Exercise 5    Exercise Name 5  bilateral plantarflexion in standing   -LB      Cueing 5  Verbal  -LB      Reps 5  15  -LB        User Key  (r) = Recorded By, (t) = Taken By, (c) = Cosigned By    Initials Name Provider Type    Sandi Fink PT Physical Therapist                          Therapy Education  Education Details: TRANSFERS into passenger seat of his truck with verbal cues.   Given: Mobility training  How Provided: Verbal  Provided to: Patient  Level of Understanding: Teach back education performed, Verbalized, Demonstrated              Time Calculation:   Start Time: 1345  Stop Time: 1430  Time Calculation (min): 45 min  Total Timed Code Minutes- PT: 45 minute(s)   Therapy Charges for Today     Code Description Service Date Service Provider Modifiers Qty    [id number]  PT NEUROMUSC RE EDUCATION EA 15 MIN 5/7/2020 Sandi Reza, PT GP 2    [id number]  PT THERAPEUTIC ACT EA 15 MIN 5/7/2020 Sandi Reza, PT GP 1                    Sandi Reza, CICI  5/7/2020     "

## 2020-05-08 ENCOUNTER — ANTICOAGULATION VISIT (OUTPATIENT)
Dept: PHARMACY | Facility: HOSPITAL | Age: 85
End: 2020-05-08

## 2020-05-08 DIAGNOSIS — Z95.2 HX OF AORTIC VALVE REPLACEMENT, MECHANICAL: ICD-10-CM

## 2020-05-08 LAB — INR PPP: 2.8

## 2020-05-08 NOTE — PROGRESS NOTES
Anticoagulation Clinic Progress Note    Anticoagulation Summary  As of 2020    INR goal:   3.0-3.5   TTR:   65.3 % (1.6 y)   INR used for dosin.80! (2020)   Warfarin maintenance plan:   5 mg every Mon, Wed, Fri; 7.5 mg all other days   Weekly warfarin total:   45 mg   Plan last modified:   Gabriela Weems RPH (2020)   Next INR check:      Priority:   Maintenance   Target end date:   Indefinite    Indications    Atrial fibrillation (CMS/HCC) [I48.91]  Hx of aortic valve replacement  mechanical [Z95.2] [Z95.2]             Anticoagulation Episode Summary     INR check location:       Preferred lab:       Send INR reminders to:    [facility] [place]    Comments:   New INR goal 3 - 3.5 (see 2020 hospitalization for CVA)      Anticoagulation Care Providers     Provider Role Specialty Phone number    Griffin Fried MD Referring Cardiology [phone]          Clinic Interview:      INR History:  Anticoagulation Monitoring 2020   INR 3.50 2.60 2.80   INR Date 2020   INR Goal 3.0-3.5 3.0-3.5 3.0-3.5   Trend Same Same Same   Last Week Total 45 mg 45 mg 47.5 mg   Next Week Total 45 mg 47.5 mg 47.5 mg   Sun 7.5 mg 7.5 mg 7.5 mg   Mon 5 mg 5 mg 5 mg   Tue 7.5 mg 7.5 mg 7.5 mg   Wed 5 mg 5 mg 5 mg   Thu 7.5 mg 7.5 mg 7.5 mg   Fri 5 mg 7.5 mg () 7.5 mg ()   Sat 7.5 mg 7.5 mg 7.5 mg   Visit Report - - -   Some recent data might be hidden       Plan:  1. INR is Subtherapeutic today- see above in Anticoagulation Summary.   Will instruct Francisco Sequeira to boost today to 7.5mg then continue their warfarin regimen- see above in Anticoagulation Summary.  2. Follow up in 1 week per usual home testing routine  3. Spoke with spouse today. They have been instructed to call if any changes in medications, doses, concerns, etc. Patient expresses understanding and has no further questions at this time.    Audra Vazquez RPH

## 2020-05-11 RX ORDER — WARFARIN SODIUM 5 MG/1
TABLET ORAL
Qty: 120 TABLET | Refills: 0 | Status: SHIPPED | OUTPATIENT
Start: 2020-05-11 | End: 2020-08-03

## 2020-05-12 ENCOUNTER — APPOINTMENT (OUTPATIENT)
Dept: PHYSICAL THERAPY | Facility: HOSPITAL | Age: 85
End: 2020-05-12

## 2020-05-14 ENCOUNTER — HOSPITAL ENCOUNTER (OUTPATIENT)
Dept: PHYSICAL THERAPY | Facility: HOSPITAL | Age: 85
Setting detail: THERAPIES SERIES
Discharge: HOME | End: 2020-05-14

## 2020-05-14 DIAGNOSIS — I63.9 CEREBROVASCULAR ACCIDENT (CVA), UNSPECIFIED MECHANISM (HCC): Primary | ICD-10-CM

## 2020-05-14 DIAGNOSIS — R26.89 FUNCTIONAL GAIT ABNORMALITY: ICD-10-CM

## 2020-05-14 PROCEDURE — 97112 NEUROMUSCULAR REEDUCATION: CPT

## 2020-05-14 PROCEDURE — 97530 THERAPEUTIC ACTIVITIES: CPT

## 2020-05-14 NOTE — THERAPY TREATMENT NOTE
"    Outpatient Physical Therapy Neuro Treatment Note  [facility]     Patient Name: Francisco Sequeira  : 1935  MRN: [id number]  Today's Date: 2020      Visit Date: 2020    Visit Dx:    ICD-10-CM ICD-9-CM   1. Cerebrovascular accident (CVA), unspecified mechanism (CMS/HCC) I63.9 434.91   2. Functional gait abnormality R26.89 781.2       Patient Active Problem List   Diagnosis   • Atrial fibrillation (CMS/HCC)   • Hypertension   • Atopic rhinitis   • Uncontrolled type 2 diabetes mellitus (CMS/HCC)   • Gastroesophageal reflux disease   • Hyperlipidemia   • Renal insufficiency   • Low testosterone   • Special screening for malignant neoplasm of prostate   • Hx of aortic valve replacement, mechanical [Z95.2]   • Screening for iron deficiency anemia   • Stroke-like symptom   • Kidney carcinoma (CMS/HCC)   • CKD (chronic kidney disease) stage 3, GFR 30-59 ml/min (CMS/HCC)   • BYRON (acute kidney injury) (CMS/HCC)   • Acute cerebral infarction (CMS/HCC)   • Cerebrovascular accident (CVA) due to embolism of right middle cerebral artery (CMS/HCC)           PT Neuro     Row Name 20 1400             Subjective Comments    Subjective Comments  \"I came down the steps with my cane. My wife held onto me.\" (2 steps to exit home with no rails. ) Wife reported at end of session \"I forgot how you told me to hold him.\" \"This feels better.\" (stated after teaching) Pt repors he goes out to his sheds with his walker.    -LB         Subjective Pain    Able to rate subjective pain?  yes  -LB      Pre-Treatment Pain Level  0  -LB      Post-Treatment Pain Level  0  -LB         Cognition    Overall Cognitive Status  WFL  -LB      Memory  Appears intact  -LB      Orientation Level  Oriented X4  -LB      Comments  Pt's wife reports he has been doing well with the cane in the home.   -LB         Transfers    Sit-Stand [place] (Transfers)  conditional independence from armless chair   -LB      Stand-Sit [place] " (Transfers)  conditional independence to armless chair  -LB      Transfers, Sit-Stand-Sit, Assist Device  other (see comments)  -LB      Transfer, Impairments  impaired balance;postural control impaired  -LB      Comment (Transfers)  Pt consistently wt shifting over LE's with to stand with no loss of balance after rising.   -LB         Gait/Stairs Assessment/Training    [place] Level (Gait)  stand by assist;contact guard  -LB      Assistive Device (Gait)  other (see comments) quad tipped cane   -LB      Distance in Feet (Gait)  175' x 3   -LB      Pattern (Gait)  step-through  -LB      Deviations/Abnormal Patterns (Gait)  gait speed decreased;stride length decreased  -LB      Bilateral Gait Deviations  forward flexed posture slightly   -LB      Left Sided Gait Deviations  weight shift ability decreased  -LB      [place] Level (Stairs)  minimum assist (75% patient effort) HHA from pt's wife  -LB      Assistive Device (Stairs)  other (see comments) quad tipped cane   -LB      Handrail Location (Stairs)  none  -LB      Number of Steps (Stairs)  3  -LB      Ascending Technique (Stairs)  step-to-step  -LB      Descending Technique (Stairs)  step-to-step  -LB      Comment (Gait/Stairs)  CURB with quad tipped cane with CGA of PT (performed 2 x) At end of session curb with quad tipped cne with minimal HHA of his wife.   -LB         Balance Skills Training    Standing-Level of Assistance  Contact guard  -LB      Static Standing Balance Support  assistive device quad tipped cane   -LB      Standing-Balance Activities  Retrieve object from floor  -LB      Gait Balance-Level of Assistance  Contact guard  -LB      Gait Balance Support  assistive device quad tipped cane  -LB      Gait Balance Activities  -- ambulating around obstacles  -LB        User Key  (r) = Recorded By, (t) = Taken By, (c) = Cosigned By    Initials Name Provider Type    Sandi Fink, PT Physical Therapist                  PT Assessment/Plan   "   Row Name 05/14/20 1727          PT Assessment    Assessment Comments  [facility] and CGA of his wife. Pt's wife called on previous day to ask if that would be OK. PT told pt that would be fine but if fatigued would return to entrance via w/c. Pt tolerated a full session and then returned to main entrance with the quad tipped cane. Pt's wife eager to learn HHA on the steps and curb and was safe assisting her .   -LB       User Key  (r) = Recorded By, (t) = Taken By, (c) = Cosigned By    Initials Name Provider Type    Sandi Fink PT Physical Therapist             OP Exercises     Row Name 05/14/20 1400             Subjective Comments    Subjective Comments  \"I came down the steps with my cane. My wife held onto me.\" (2 steps to exit home with no rails. ) Wife reported at end of session \"I forgot how you told me to hold him.\" \"This feels better.\" (stated after teaching) Pt repors he goes out to his sheds with his walker.    -LB         Subjective Pain    Able to rate subjective pain?  yes  -LB      Pre-Treatment Pain Level  0  -LB      Post-Treatment Pain Level  0  -LB        User Key  (r) = Recorded By, (t) = Taken By, (c) = Cosigned By    Initials Name Provider Type    Sandi Fink PT Physical Therapist                          Therapy Education  Education Details: PT educated pt and wife on HHA on the curb and on the steps with a cane.  Given: Mobility training  How Provided: Verbal  Provided to: Patient, Caregiver  Level of Understanding: Verbalized, Teach back education performed, Demonstrated              Time Calculation:   Start Time: 1400  Stop Time: 1445  Time Calculation (min): 45 min  Total Timed Code Minutes- PT: 45 minute(s)   Therapy Charges for Today     Code Description Service Date Service Provider Modifiers Qty    19057307127  PT NEUROMUSC RE EDUCATION EA 15 MIN 5/14/2020 Sandi Reza PT GP 2    [id number]  PT " THERAPEUTIC ACT EA 15 MIN 5/14/2020 Sandi Reza, PT GP 1            Patient was wearing a face mask during this therapy encounter. Therapist used appropriate personal protective equipment including mask and gloves.  Mask used was standard procedure mask. Appropriate PPE was worn during the entire therapy session. Hand hygiene was completed before and after therapy session. Patient is not in enhanced droplet precautions.             Sandi Reza, PT  5/14/2020

## 2020-05-15 ENCOUNTER — OFFICE VISIT (OUTPATIENT)
Dept: FAMILY MEDICINE CLINIC | Facility: CLINIC | Age: 85
End: 2020-05-15

## 2020-05-15 ENCOUNTER — ANTICOAGULATION VISIT (OUTPATIENT)
Dept: PHARMACY | Facility: HOSPITAL | Age: 85
End: 2020-05-15

## 2020-05-15 ENCOUNTER — DOCUMENTATION (OUTPATIENT)
Dept: FAMILY MEDICINE CLINIC | Facility: CLINIC | Age: 85
End: 2020-05-15

## 2020-05-15 VITALS
TEMPERATURE: 97.5 F | SYSTOLIC BLOOD PRESSURE: 138 MMHG | WEIGHT: 185 LBS | OXYGEN SATURATION: 96 % | HEART RATE: 65 BPM | HEIGHT: 72 IN | BODY MASS INDEX: 25.06 KG/M2 | DIASTOLIC BLOOD PRESSURE: 70 MMHG

## 2020-05-15 DIAGNOSIS — K21.9 GASTROESOPHAGEAL REFLUX DISEASE WITHOUT ESOPHAGITIS: ICD-10-CM

## 2020-05-15 DIAGNOSIS — E11.649 UNCONTROLLED TYPE 2 DIABETES MELLITUS WITH HYPOGLYCEMIA WITHOUT COMA (HCC): ICD-10-CM

## 2020-05-15 DIAGNOSIS — IMO0002 UNCONTROLLED TYPE 2 DIABETES MELLITUS WITH COMPLICATION, WITH LONG-TERM CURRENT USE OF INSULIN: Primary | ICD-10-CM

## 2020-05-15 DIAGNOSIS — E78.5 HYPERLIPIDEMIA, UNSPECIFIED HYPERLIPIDEMIA TYPE: ICD-10-CM

## 2020-05-15 DIAGNOSIS — I10 ESSENTIAL HYPERTENSION: ICD-10-CM

## 2020-05-15 DIAGNOSIS — Z95.2 HX OF AORTIC VALVE REPLACEMENT, MECHANICAL: ICD-10-CM

## 2020-05-15 PROBLEM — Z13.0 SCREENING FOR IRON DEFICIENCY ANEMIA: Status: RESOLVED | Noted: 2018-11-01 | Resolved: 2020-05-15

## 2020-05-15 LAB
HBA1C MFR BLD: 7.5 %
INR PPP: 3.3

## 2020-05-15 PROCEDURE — 83036 HEMOGLOBIN GLYCOSYLATED A1C: CPT | Performed by: NURSE PRACTITIONER

## 2020-05-15 PROCEDURE — 99214 OFFICE O/P EST MOD 30 MIN: CPT | Performed by: NURSE PRACTITIONER

## 2020-05-15 NOTE — PROGRESS NOTES
"Subjective   Francisco Sequeira is a 84 y.o. male.     History of Present Illness    Since the last visit, he has overall felt well.  He has Essential Hypertension and well controlled on current medication, DMII well controlled on medication and will continue regimen, GERD well controlled on PPI and plan trial of step down therapy with H2 blocker and Hyperlipidemia with goals met with current Rx.  he has been compliant with current medications have reviewed them.  The patient denies medication side effects.  Will refill medications. /70 (BP Location: Left arm, Patient Position: Sitting)   Pulse 65   Temp 97.5 °F (36.4 °C)   Ht 182.9 cm (72.01\")   Wt 83.9 kg (185 lb)   SpO2 96%   BMI 25.08 kg/m²     Results for orders placed or performed in visit on 05/15/20   POC Glycosylated Hemoglobin (Hb A1C)   Result Value Ref Range    Hemoglobin A1C 7.5 %         The following portions of the patient's history were reviewed and updated as appropriate: allergies, current medications, past social history and problem list.    Review of Systems   Constitutional: Negative for fever.   Respiratory: Negative for cough and shortness of breath.    Cardiovascular: Negative for chest pain.   Gastrointestinal: Negative for abdominal pain.   Neurological: Negative for dizziness.       Objective   /70 (BP Location: Left arm, Patient Position: Sitting)   Pulse 65   Temp 97.5 °F (36.4 °C)   Ht 182.9 cm (72.01\")   Wt 83.9 kg (185 lb)   SpO2 96%   BMI 25.08 kg/m²   Physical Exam   Constitutional: He is oriented to person, place, and time. Vital signs are normal. He appears well-developed and well-nourished. No distress.   HENT:   Head: Normocephalic.   Cardiovascular: Normal rate, regular rhythm and normal heart sounds.   Pulmonary/Chest: Effort normal and breath sounds normal.   Neurological: He is alert and oriented to person, place, and time. Gait normal.   Psychiatric: He has a normal mood and affect. His behavior is " normal. Judgment and thought content normal.   Vitals reviewed.      Assessment/Plan      Diagnosis Plan   1. Uncontrolled type 2 diabetes mellitus with complication, with long-term current use of insulin (CMS/[facility])  POC Glycosylated Hemoglobin (Hb A1C)    insulin degludec (Tresiba FlexTouch) 100 UNIT/ML solution pen-injector injection   2. Essential hypertension     3. Gastroesophageal reflux disease without esophagitis     4. Uncontrolled type 2 diabetes mellitus with hypoglycemia without coma (CMS/[facility])     5. Hyperlipidemia, unspecified hyperlipidemia type       Cont same with meds  rto in 3 lui Hinkle, APRN  5/15/2020

## 2020-05-15 NOTE — PROGRESS NOTES
Anticoagulation Clinic Progress Note    Anticoagulation Summary  As of 5/15/2020    INR goal:   3.0-3.5   TTR:   65.3 % (1.6 y)   INR used for dosing:   3.30 (5/15/2020)   Warfarin maintenance plan:   5 mg every Sun, Wed; 7.5 mg all other days   Weekly warfarin total:   47.5 mg   Plan last modified:   Vasquez Niño RPH (5/15/2020)   Next INR check:   5/22/2020   Priority:   Maintenance   Target end date:   Indefinite    Indications    Atrial fibrillation (CMS/HCC) [I48.91]  Hx of aortic valve replacement  mechanical [Z95.2] [Z95.2]             Anticoagulation Episode Summary     INR check location:       Preferred lab:       Send INR reminders to:   [facility]    Comments:   New INR goal 3 - 3.5 (see 1/2020 hospitalization for CVA)      Anticoagulation Care Providers     Provider Role Specialty Phone number    Griffin Fried MD Referring Cardiology [phone]          Clinic Interview:  Patient Findings     Negatives:   Signs/symptoms of thrombosis, Signs/symptoms of bleeding,   Laboratory test error suspected, Change in health, Change in alcohol use,   Change in activity, Upcoming invasive procedure, Emergency department   visit, Upcoming dental procedure, Missed doses, Extra doses, Change in   medications, Change in diet/appetite, Hospital admission, Bruising, Other   complaints      Clinical Outcomes     Negatives:   Major bleeding event, Thromboembolic event,   Anticoagulation-related hospital admission, Anticoagulation-related ED   visit, Anticoagulation-related fatality        INR History:  Anticoagulation Monitoring 5/1/2020 5/8/2020 5/15/2020   INR 2.60 2.80 3.30   INR Date 5/1/2020 5/8/2020 5/15/2020   INR Goal 3.0-3.5 3.0-3.5 3.0-3.5   Trend Same Same Up   Last Week Total 45 mg 47.5 mg 47.5 mg   Next Week Total 47.5 mg 47.5 mg 47.5 mg   Sun 7.5 mg 7.5 mg 5 mg   Mon 5 mg 5 mg 7.5 mg   Tue 7.5 mg 7.5 mg 7.5 mg   Wed 5 mg 5 mg 5 mg   Thu 7.5 mg 7.5 mg 7.5 mg   Fri 7.5 mg (5/1) 7.5 mg  (5/8) 7.5 mg   Sat 7.5 mg 7.5 mg 7.5 mg   Visit Report - - -   Some recent data might be hidden       Plan:  1. INR is Therapeutic today- see above in Anticoagulation Summary.   Will instruct Francisco Sequeira to Change their warfarin regimen- see above in Anticoagulation Summary.  2. Follow up in 1 week  3. They have been instructed to call if any changes in medications, doses, concerns, etc. Patient expresses understanding and has no further questions at this time.    Vasquez Niño [facility]

## 2020-05-19 ENCOUNTER — APPOINTMENT (OUTPATIENT)
Dept: PHYSICAL THERAPY | Facility: HOSPITAL | Age: 85
End: 2020-05-19

## 2020-05-19 DIAGNOSIS — IMO0002 UNCONTROLLED TYPE 2 DIABETES MELLITUS WITH COMPLICATION, WITH LONG-TERM CURRENT USE OF INSULIN: ICD-10-CM

## 2020-05-21 ENCOUNTER — HOSPITAL ENCOUNTER (OUTPATIENT)
Dept: PHYSICAL THERAPY | Facility: HOSPITAL | Age: 85
Setting detail: THERAPIES SERIES
Discharge: HOME | End: 2020-05-21

## 2020-05-21 DIAGNOSIS — R26.89 FUNCTIONAL GAIT ABNORMALITY: ICD-10-CM

## 2020-05-21 DIAGNOSIS — I63.9 CEREBROVASCULAR ACCIDENT (CVA), UNSPECIFIED MECHANISM (HCC): Primary | ICD-10-CM

## 2020-05-21 PROCEDURE — 97530 THERAPEUTIC ACTIVITIES: CPT

## 2020-05-21 PROCEDURE — 97112 NEUROMUSCULAR REEDUCATION: CPT

## 2020-05-21 RX ORDER — FUROSEMIDE 20 MG/1
TABLET ORAL
Qty: 90 TABLET | Refills: 0 | Status: SHIPPED | OUTPATIENT
Start: 2020-05-21 | End: 2020-08-18

## 2020-05-21 NOTE — THERAPY PROGRESS REPORT/RE-CERT
Outpatient Physical Therapy Neuro Progress Note  [facility]     Patient Name: Francisco Sequeira  : 1935  MRN: [id number]  Today's Date: 2020      Visit Date: 2020    Visit Dx:    ICD-10-CM ICD-9-CM   1. Cerebrovascular accident (CVA), unspecified mechanism (CMS/HCC) I63.9 434.91   2. Functional gait abnormality R26.89 781.2       Patient Active Problem List   Diagnosis   • Atrial fibrillation (CMS/HCC)   • Hypertension   • Atopic rhinitis   • Uncontrolled type 2 diabetes mellitus (CMS/HCC)   • Gastroesophageal reflux disease   • Hyperlipidemia   • Uncontrolled type 2 diabetes mellitus with complication, with long-term current use of insulin (CMS/HCC)   • Renal insufficiency   • Low testosterone   • Special screening for malignant neoplasm of prostate   • Hx of aortic valve replacement, mechanical [Z95.2]   • Stroke-like symptom   • Kidney carcinoma (CMS/HCC)   • CKD (chronic kidney disease) stage 3, GFR 30-59 ml/min (CMS/HCC)   • BYRON (acute kidney injury) (CMS/HCC)   • Acute cerebral infarction (CMS/HCC)   • Cerebrovascular accident (CVA) due to embolism of right middle cerebral artery (CMS/HCC)           PT Neuro     Row Name [id number]             Subjective Comments    Subjective Comments  Pt and wife reported he has been using the cane 99% of the time. They reported he uses his walker in the early morning and after his shower in the evening.   -LB         Precautions and Contraindications    Precautions  falls   -LB         Subjective Pain    Able to rate subjective pain?  yes  -LB      Pre-Treatment Pain Level  0  -LB      Post-Treatment Pain Level  0  -LB         Cognition    Overall Cognitive Status  WFL  -LB      Memory  Appears intact  -LB      Orientation Level  Oriented X4  -LB      Comments  Pt and wife agree pt's wife holds onto him when he goes down the 2 steps into the room that leads outside.    -LB         Transfers    Sit-Stand [place] (Transfers)  conditional  independence light use of UE's   -LB      Stand-Sit [place] (Transfers)  conditional independence light use of UE's   -LB      Transfers, Sit-Stand-Sit, Assist Device  other (see comments) quad tipped cane and no device  -LB      Transfer, Impairments  impaired balance  -LB      Comment (Transfers)  When coming to stand without his device pt maintains his balance.  -LB         Gait/Stairs Assessment/Training    [place] Level (Gait)  stand by assist;supervision  -LB      Assistive Device (Gait)  other (see comments) quad tipped cane   -LB      Distance in Feet (Gait)  150'   -LB      Pattern (Gait)  step-through  -LB      Deviations/Abnormal Patterns (Gait)  gait speed decreased;stride length decreased  -LB      Bilateral Gait Deviations  forward flexed posture  -LB      Left Sided Gait Deviations  weight shift ability decreased  -LB      [place] Level (Stairs)  contact guard  -LB      Assistive Device (Stairs)  other (see comments) quad tipped cane   -LB      Handrail Location (Stairs)  none  -LB      Number of Steps (Stairs)  3  -LB      Ascending Technique (Stairs)  step-to-step  -LB      Descending Technique (Stairs)  step-to-step  -LB      Stairs, Safety Issues  weight-shifting ability decreased  -LB      Stairs, Impairments  impaired balance  -LB      Comment (Gait/Stairs)  see Sukhwinder  Pt ascended and descended 4 steps with one rail step over step independently.   -LB         Balance Skills Training    Balance Comments  see Yadira   -LB        User Key  (r) = Recorded By, (t) = Taken By, (c) = Cosigned By    Initials Name Provider Type    Sandi Fink, PT Physical Therapist                  PT Assessment/Plan     Row Name 05/21/20 1196          PT Assessment    Assessment Comments  Pt has been seen in PT 1 x a week with emphasis on transfers, gait and balance. Pt has been advancing to a quad tipped cane with supervison in the home and CGA in the community. Pt uses his  rolling walker in the early am and at night. Pt's outcome measures were performed with pt ambulating with his quad tipped cane verses previously with his rolling walker.  Pt's scored improved one on the SEVILLA and stayed the same on the DGI. Pt's score on the Tinetti decreaed as pt's step length is decreased on the right. Pt would benefit from continued PT with emphasis on gait and balance with the quad tipped cane.   -LB        PT Plan    PT Frequency  1x/week  -LB     Predicted Duration of Therapy Intervention (Therapy Eval)  2-3 weeks   -LB       User Key  (r) = Recorded By, (t) = Taken By, (c) = Cosigned By    Initials Name Provider Type    Sandi Fink, PT Physical Therapist             OP Exercises     Row Name 05/21/20 1345             Subjective Comments    Subjective Comments  Pt and wife reported he has been using the cane 99% of the time. They reported he uses his walker in the early morning and after his shower in the evening.   -LB         Subjective Pain    Able to rate subjective pain?  yes  -LB      Pre-Treatment Pain Level  0  -LB      Post-Treatment Pain Level  0  -LB        User Key  (r) = Recorded By, (t) = Taken By, (c) = Cosigned By    Initials Name Provider Type    Sandi Fink, PT Physical Therapist                      PT OP Goals     Row Name 05/21/20 1345          PT Short Term Goals    STG 1  Pt to come to stand from an armless chair to his walker conditional independent.   -LB     STG 1 Progress  Met  -LB     STG 2  Pt to perform sit to stand and stand to sit with increased weight shift over his LE's.   -LB     STG 2 Progress  Met  -LB     STG 3  Pt to ambulate with decreased increased left toe clearance.   -LB     STG 3 Progress  Met  -LB     STG 4  Pt to improve score on the SEVILLA to 36/56 to improve balance and reduce risk of falls.  -LB     STG 4 Progress  Met  -LB     STG 5  Pt to improve score on the Dynamic Gait Index to 12/24 to improve balance and reduce risk of falls.    -LB     STG 5 Progress  Met  -LB     STG 6  Pt to ascend and descend 4 steps with one rail step to step with CGA with correct sequencing.   -LB     STG 6 Progress  Met  -LB     STG 7  Pt to perform floor transfers with UE support with minimal of 1.   -LB     STG 7 Progress  Met  -LB     STG 8  Pt to ambulate ~ 250' with rolling walker with left heelstrike and stride length.   -LB     STG 8 Progress  Met  -LB        Long Term Goals    LTG Date to Achieve  06/04/20  -LB     LTG 1  Pt to come to stand from an armless chair with least restricitve device conditional independent.   -LB     LTG 1 Progress  Met  -LB     LTG 2  Pt to complete the TUG in < 15 seconds while ambulating with least restrictive device.   -LB     LTG 2 Progress  Progressing;Ongoing  -LB     LTG 3  Pt to increase strength left hip flexors and abductors to 5/5.   -LB     LTG 3 Progress  Met  -LB     LTG 4  Pt to improve score on the SEVILLA to 41/56 to improve balance and reduce risk of falls.  -LB     LTG 4 Progress  Met  -LB     LTG 4 Progress Comments  Pt scored a a 42/56.   -LB     LTG 5  Pt to improve score on the Dynamic Gait Index to 17/24 to improve balance and reduce risk of falls.  -LB     LTG 5 Progress  Progressing;Ongoing  -LB     LTG 5 Progress Comments  Pt scored a 15/24 whgile ambulating with a quad tipped cane with SBA.  -LB     LTG 6  Pt to ascend and descend 3 steps with least restrictive device with SBA.   -LB     LTG 6 Progress  Progressing;Ongoing  -LB     LTG 6 Progress Comments  Pt requires CGA.  -LB     LTG 7  Pt to perform floor transfers with CGA to minimal of 1.   -LB     LTG 7 Progress  Ongoing  -LB     LTG 8  Pt to ambulate >500' with least restricitve device on level surfaces conditional independent.   -LB     LTG 8 Progress  Ongoing  -LB     LTG 9  Pt to ascend and descend a curb with quad tipped cane with CGA.   -LB     LTG 9 Progress  Met  -LB        Time Calculation    PT Goal Re-Cert Due Date  06/18/20  -LB       User  Key  (r) = Recorded By, (t) = Taken By, (c) = Cosigned By    Initials Name Provider Type    Sandi Fink, PT Physical Therapist          Therapy Education  Education Details: Discussed with pt and wife the scores on his outcome measures. The scores place him at risk for falls and pt should continue with use of his cane and walker.   Given: Fall prevention and home safety, Other (comment)(outcome measures )  How Provided: Verbal  Provided to: Patient, Caregiver  Level of Understanding: Verbalized    Harp Balance Scale  Sitting to Standing: able to stand independently using hands  Standing Unsupported: able to stand safely for 2 minutes  Sitting with Back Unsupported but Feet Supported on Floor or on Stool: able to sit safely and securely for 2 minutes  Standing to Sitting: sits safely with minimal use of hands  Transfers: able to transfer safely with minor use of hands  Standing Unsupported with Eyes Closed: able to stand 10 seconds safely  Standing Unsupported with Feet Together: able to place feet together independently and stand 1 minute with supervision  Reaching Forward with Outstretched Arm While Standing: can reach forward confidently 25 cm (10 inches)   Object From the Floor From a Standing Position: able to  object but needs supervision  Turning to Look Behind Over Left and Right Shoulders While Standing: looks behind from both sides and weight shifts well  Turn 360 Degrees: able to turn 360 degrees safely but slowly  Place Alternate Foot on Step or Stool While Standing Unsupported: able to complete > 2 steps needs minimal assist  Standing Unsupported with One Foot in Front: able to take small step independently and hold 30 seconds  Standing on One Leg: unable to try of needs assist to prevent fall  Harp Total Score: 42  Harp Comments: fall risk   Dynamic Gait Index (DGI)  Gait Level Surface: Mild impairment: walks 20’, uses assistive devices, slower speed, mild gait deviations  Change in  "Gait Speed: Mild impairment: Is able to change speed but demonstrates mild gait deviations, or no gait deviations but unable to achieve a significant change in velocity, or uses as assistive device  Gait with Horizontal Head Turns: Mild impairment: Performs head turns smoothly with slight change in gait velocity, i.e. minor disruption to smooth gait path or uses walking aid.  Gait with Vertical Head Turns: Mild impairment: Performs head turns smoothly with slight change in gait velocity, i.e. minor disruption to smooth gait path or uses walking aid.  Gait and Pivot Turn: Mild impairment: pivot turns safely in >3 seconds and stops with no loss of balance.  Step Over Obstacle: Moderate impairment: Is able to step over box but must stop, then step over. May require verbal cueing.  Step Around Obstacles: Mild impairment: Is able to step around both cones, but must slow down and adjust steps to clear cones.  Steps: Mild impairment: Alternating feet, must use rail.  Dynamic Gait Index Score: 15  Dynamic Gait Index Comments: with quad tipped cane  Tinetti Assessment  Tinetti Assessment: yes  Sitting Balance: Steady,safe  Arises: Able, uses arms  Attempts to Rise: Able in 1 attempt  Immediate Standing Balance (first 5 sec): Steady without support  Standing Balance: Narrow stance, without support  Sternal Nudge (feet close together): Stagger, grabs, catches self  Eyes Closed (feet close together): Steady  Turning 360 Degrees- Steps: Continuous steps  Turning 360 Degrees- Steadiness: Steady  Sitting Down: Uses arms or not a smooth motion  Tinetti Balance Score: 13  Gait Initiation (immediate after told \"go\"): No hesitancy  Step Length- Right Swing: Right swing foot does not pass Left stance leg  Step Length- Left Swing: Left swing foot passes Right  Foot Clearance- Right Foot: Right foot completely clears floor  Foot Clearance- Left Foot: Left foot completely clears floor  Step Symmetry: Right and Left step length " unequal  Step Continuity: Steps appear continuous  Path (excursion): Mild/moderate deviation or use of aid  Trunk: Marked sway or uses device  Base of Support: Heels close while walking  Gait Score: 7  Tinetti Total Score: 20  Tinetti Assistive Device: other (see comments)(quad tipped cane )      Time Calculation:   Start Time: 1345  Stop Time: 1430  Time Calculation (min): 45 min  Total Timed Code Minutes- PT: 45 minute(s)   Therapy Charges for Today     Code Description Service Date Service Provider Modifiers Qty    [id number]  PT NEUROMUSC RE EDUCATION EA 15 MIN 5/21/2020 Sandi Reza, PT GP 2    [id number] HC PT THERAPEUTIC ACT EA 15 MIN 5/21/2020 Sandi Reza, PT GP 1          PT G-Codes  Harp Total Score: 42  Tinetti Total Score: 20     Patient was wearing a face mask during this therapy encounter. Therapist used appropriate personal protective equipment including mask and gloves.  Mask used was standard procedure mask. Appropriate PPE was worn during the entire therapy session. Hand hygiene was completed before and after therapy session. Patient is not in enhanced droplet precautions.         Sandi Reza, PT  5/21/2020

## 2020-05-21 NOTE — THERAPY PROGRESS REPORT/RE-CERT
Outpatient Physical Therapy Neuro Progress Note  [facility]     Patient Name: Francisco Sequeira  : 1935  MRN: [id number]  Today's Date: 2020      Visit Date: 2020    Visit Dx:    ICD-10-CM ICD-9-CM   1. Cerebrovascular accident (CVA), unspecified mechanism (CMS/HCC) I63.9 434.91   2. Functional gait abnormality R26.89 781.2       Patient Active Problem List   Diagnosis   • Atrial fibrillation (CMS/HCC)   • Hypertension   • Atopic rhinitis   • Uncontrolled type 2 diabetes mellitus (CMS/HCC)   • Gastroesophageal reflux disease   • Hyperlipidemia   • Uncontrolled type 2 diabetes mellitus with complication, with long-term current use of insulin (CMS/HCC)   • Renal insufficiency   • Low testosterone   • Special screening for malignant neoplasm of prostate   • Hx of aortic valve replacement, mechanical [Z95.2]   • Stroke-like symptom   • Kidney carcinoma (CMS/HCC)   • CKD (chronic kidney disease) stage 3, GFR 30-59 ml/min (CMS/HCC)   • BYRON (acute kidney injury) (CMS/HCC)   • Acute cerebral infarction (CMS/HCC)   • Cerebrovascular accident (CVA) due to embolism of right middle cerebral artery (CMS/HCC)           PT Neuro     Row Name 20 4965             Subjective Comments    Subjective Comments  Pt and wife reported he has been using the cane 99% of the time. They reported he uses his walker in the early morning and after his shower in the evening.   -LB         Precautions and Contraindications    Precautions  falls   -LB         Subjective Pain    Able to rate subjective pain?  yes  -LB      Pre-Treatment Pain Level  0  -LB      Post-Treatment Pain Level  0  -LB         Cognition    Overall Cognitive Status  WFL  -LB      Memory  Appears intact  -LB      Orientation Level  Oriented X4  -LB      Comments  Pt and wife agree pt's wife holds onto him when he goes down the 2 steps into the room that leads outside.    -LB         Transfers    Sit-Stand [place] (Transfers)  conditional  independence light use of UE's   -LB      Stand-Sit [place] (Transfers)  conditional independence light use of UE's   -LB      Transfers, Sit-Stand-Sit, Assist Device  other (see comments) quad tipped cane and no device  -LB      Transfer, Impairments  impaired balance  -LB      Comment (Transfers)  When coming to stand without his device pt maintains his balance.  -LB         Gait/Stairs Assessment/Training    [place] Level (Gait)  stand by assist;supervision  -LB      Assistive Device (Gait)  other (see comments) quad tipped cane   -LB      Distance in Feet (Gait)  150'   -LB      Pattern (Gait)  step-through  -LB      Deviations/Abnormal Patterns (Gait)  gait speed decreased;stride length decreased  -LB      Bilateral Gait Deviations  forward flexed posture  -LB      Left Sided Gait Deviations  weight shift ability decreased  -LB      [place] Level (Stairs)  contact guard  -LB      Assistive Device (Stairs)  other (see comments) quad tipped cane   -LB      Handrail Location (Stairs)  none  -LB      Number of Steps (Stairs)  3  -LB      Ascending Technique (Stairs)  step-to-step  -LB      Descending Technique (Stairs)  step-to-step  -LB      Stairs, Safety Issues  weight-shifting ability decreased  -LB      Stairs, Impairments  impaired balance  -LB      Comment (Gait/Stairs)  see Sukhwinder  Pt ascended and descended 4 steps with one rail step over step independently.   -LB         Balance Skills Training    Balance Comments  see Yadira   -LB        User Key  (r) = Recorded By, (t) = Taken By, (c) = Cosigned By    Initials Name Provider Type    Sandi Fink, PT Physical Therapist                  PT Assessment/Plan     Row Name 05/21/20 2780          PT Assessment    Assessment Comments  Pt has been seen in PT 1 x a week with emphasis on transfers, gait and balance. Pt has been advancing to a quad tipped cane with supervison in the home and CGA in the community. Pt uses his  rolling walker in the early am and at night. Pt's outcome measures were performed with pt ambulating with his quad tipped cane verses previously with his rolling walker.  Pt's scored improved one on the SEVILLA and stayed the same on the DGI. Pt's score on the Tinetti decreaed as pt's step length is decreased on the right. Pt would benefit from continued PT with emphasis on gait and balance with the quad tipped cane.   -LB        PT Plan    PT Frequency  1x/week  -LB     Predicted Duration of Therapy Intervention (Therapy Eval)  2-3 weeks   -LB       User Key  (r) = Recorded By, (t) = Taken By, (c) = Cosigned By    Initials Name Provider Type    Sandi Fink, PT Physical Therapist             OP Exercises     Row Name 05/21/20 1345             Subjective Comments    Subjective Comments  Pt and wife reported he has been using the cane 99% of the time. They reported he uses his walker in the early morning and after his shower in the evening.   -LB         Subjective Pain    Able to rate subjective pain?  yes  -LB      Pre-Treatment Pain Level  0  -LB      Post-Treatment Pain Level  0  -LB        User Key  (r) = Recorded By, (t) = Taken By, (c) = Cosigned By    Initials Name Provider Type    Sandi Fink, PT Physical Therapist                      PT OP Goals     Row Name 05/21/20 1345          PT Short Term Goals    STG 1  Pt to come to stand from an armless chair to his walker conditional independent.   -LB     STG 1 Progress  Met  -LB     STG 2  Pt to perform sit to stand and stand to sit with increased weight shift over his LE's.   -LB     STG 2 Progress  Met  -LB     STG 3  Pt to ambulate with decreased increased left toe clearance.   -LB     STG 3 Progress  Met  -LB     STG 4  Pt to improve score on the SEVILLA to 36/56 to improve balance and reduce risk of falls.  -LB     STG 4 Progress  Met  -LB     STG 5  Pt to improve score on the Dynamic Gait Index to 12/24 to improve balance and reduce risk of falls.    -LB     STG 5 Progress  Met  -LB     STG 6  Pt to ascend and descend 4 steps with one rail step to step with CGA with correct sequencing.   -LB     STG 6 Progress  Met  -LB     STG 7  Pt to perform floor transfers with UE support with minimal of 1.   -LB     STG 7 Progress  Met  -LB     STG 8  Pt to ambulate ~ 250' with rolling walker with left heelstrike and stride length.   -LB     STG 8 Progress  Met  -LB        Long Term Goals    LTG Date to Achieve  06/04/20  -LB     LTG 1  Pt to come to stand from an armless chair with least restricitve device conditional independent.   -LB     LTG 1 Progress  Met  -LB     LTG 2  Pt to complete the TUG in < 15 seconds while ambulating with least restrictive device.   -LB     LTG 2 Progress  Progressing;Ongoing  -LB     LTG 3  Pt to increase strength left hip flexors and abductors to 5/5.   -LB     LTG 3 Progress  Met  -LB     LTG 4  Pt to improve score on the SEVILLA to 41/56 to improve balance and reduce risk of falls.  -LB     LTG 4 Progress  Met  -LB     LTG 4 Progress Comments  Pt scored a a 42/56.   -LB     LTG 5  Pt to improve score on the Dynamic Gait Index to 17/24 to improve balance and reduce risk of falls.  -LB     LTG 5 Progress  Progressing;Ongoing  -LB     LTG 5 Progress Comments  Pt scored a 15/24 whgile ambulating with a quad tipped cane with SBA.  -LB     LTG 6  Pt to ascend and descend 3 steps with least restrictive device with SBA.   -LB     LTG 6 Progress  Progressing;Ongoing  -LB     LTG 6 Progress Comments  Pt requires CGA.  -LB     LTG 7  Pt to perform floor transfers with CGA to minimal of 1.   -LB     LTG 7 Progress  Ongoing  -LB     LTG 8  Pt to ambulate >500' with least restricitve device on level surfaces conditional independent.   -LB     LTG 8 Progress  Ongoing  -LB     LTG 9  Pt to ascend and descend a curb with quad tipped cane with CGA.   -LB     LTG 9 Progress  Met  -LB        Time Calculation    PT Goal Re-Cert Due Date  06/18/20  -LB       User  Key  (r) = Recorded By, (t) = Taken By, (c) = Cosigned By    Initials Name Provider Type    Sandi Fink, PT Physical Therapist          Therapy Education  Education Details: Discussed with pt and wife the scores on his outcome measures. The scores place him at risk for falls and pt should continue with use of his cane and walker.   Given: Fall prevention and home safety, Other (comment)(outcome measures )  How Provided: Verbal  Provided to: Patient, Caregiver  Level of Understanding: Verbalized    Harp Balance Scale  Sitting to Standing: able to stand independently using hands  Standing Unsupported: able to stand safely for 2 minutes  Sitting with Back Unsupported but Feet Supported on Floor or on Stool: able to sit safely and securely for 2 minutes  Standing to Sitting: sits safely with minimal use of hands  Transfers: able to transfer safely with minor use of hands  Standing Unsupported with Eyes Closed: able to stand 10 seconds safely  Standing Unsupported with Feet Together: able to place feet together independently and stand 1 minute with supervision  Reaching Forward with Outstretched Arm While Standing: can reach forward confidently 25 cm (10 inches)   Object From the Floor From a Standing Position: able to  object but needs supervision  Turning to Look Behind Over Left and Right Shoulders While Standing: looks behind from both sides and weight shifts well  Turn 360 Degrees: able to turn 360 degrees safely but slowly  Place Alternate Foot on Step or Stool While Standing Unsupported: able to complete > 2 steps needs minimal assist  Standing Unsupported with One Foot in Front: able to take small step independently and hold 30 seconds  Standing on One Leg: unable to try of needs assist to prevent fall  Harp Total Score: 42  Harp Comments: fall risk   Dynamic Gait Index (DGI)  Gait Level Surface: Mild impairment: walks 20’, uses assistive devices, slower speed, mild gait deviations  Change in  "Gait Speed: Mild impairment: Is able to change speed but demonstrates mild gait deviations, or no gait deviations but unable to achieve a significant change in velocity, or uses as assistive device  Gait with Horizontal Head Turns: Mild impairment: Performs head turns smoothly with slight change in gait velocity, i.e. minor disruption to smooth gait path or uses walking aid.  Gait with Vertical Head Turns: Mild impairment: Performs head turns smoothly with slight change in gait velocity, i.e. minor disruption to smooth gait path or uses walking aid.  Gait and Pivot Turn: Mild impairment: pivot turns safely in >3 seconds and stops with no loss of balance.  Step Over Obstacle: Moderate impairment: Is able to step over box but must stop, then step over. May require verbal cueing.  Step Around Obstacles: Mild impairment: Is able to step around both cones, but must slow down and adjust steps to clear cones.  Steps: Mild impairment: Alternating feet, must use rail.  Dynamic Gait Index Score: 15  Dynamic Gait Index Comments: with quad tipped cane  Tinetti Assessment  Tinetti Assessment: yes  Sitting Balance: Steady,safe  Arises: Able, uses arms  Attempts to Rise: Able in 1 attempt  Immediate Standing Balance (first 5 sec): Steady without support  Standing Balance: Narrow stance, without support  Sternal Nudge (feet close together): Stagger, grabs, catches self  Eyes Closed (feet close together): Steady  Turning 360 Degrees- Steps: Continuous steps  Turning 360 Degrees- Steadiness: Steady  Sitting Down: Uses arms or not a smooth motion  Tinetti Balance Score: 13  Gait Initiation (immediate after told \"go\"): No hesitancy  Step Length- Right Swing: Right swing foot does not pass Left stance leg  Step Length- Left Swing: Left swing foot passes Right  Foot Clearance- Right Foot: Right foot completely clears floor  Foot Clearance- Left Foot: Left foot completely clears floor  Step Symmetry: Right and Left step length " unequal  Step Continuity: Steps appear continuous  Path (excursion): Mild/moderate deviation or use of aid  Trunk: Marked sway or uses device  Base of Support: Heels close while walking  Gait Score: 7  Tinetti Total Score: 20  Tinetti Assistive Device: other (see comments)(quad tipped cane )      Time Calculation:   Start Time: 1345  Stop Time: 1430  Time Calculation (min): 45 min  Total Timed Code Minutes- PT: 45 minute(s)   Therapy Charges for Today     Code Description Service Date Service Provider Modifiers Qty    96767763200  PT NEUROMUSC RE EDUCATION EA 15 MIN 5/21/2020 Sandi Reza, PT GP 2    [id number]  PT THERAPEUTIC ACT EA 15 MIN 5/21/2020 Sandi Reza, PT GP 1          PT G-Codes  Harp Total Score: 42  Tinetti Total Score: 20         Sandi Reza, PT  5/21/2020

## 2020-05-22 ENCOUNTER — ANTICOAGULATION VISIT (OUTPATIENT)
Dept: PHARMACY | Facility: HOSPITAL | Age: 85
End: 2020-05-22

## 2020-05-22 DIAGNOSIS — Z95.2 HX OF AORTIC VALVE REPLACEMENT, MECHANICAL: ICD-10-CM

## 2020-05-22 LAB — INR PPP: 2.9

## 2020-05-22 NOTE — PROGRESS NOTES
Anticoagulation Clinic Progress Note    Anticoagulation Summary  As of 2020    INR goal:   3.0-3.5   TTR:   65.4 % (1.6 y)   INR used for dosin.90! (2020)   Warfarin maintenance plan:   5 mg every Wed; 7.5 mg all other days   Weekly warfarin total:   50 mg   Plan last modified:   Vasquez Niño RPH (2020)   Next INR check:   2020   Priority:   Maintenance   Target end date:   Indefinite    Indications    Atrial fibrillation (CMS/HCC) [I48.91]  Hx of aortic valve replacement  mechanical [Z95.2] [Z95.2]             Anticoagulation Episode Summary     INR check location:       Preferred lab:       Send INR reminders to:    CHRIS GUTIERREZ [facility]    Comments:   New INR goal 3 - 3.5 (see 2020 hospitalization for CVA)      Anticoagulation Care Providers     Provider Role Specialty Phone number    Griffin Fried MD Referring Cardiology [phone]          Clinic Interview:  Patient Findings     Positives:   Change in alcohol use    Negatives:   Signs/symptoms of thrombosis, Signs/symptoms of bleeding,   Laboratory test error suspected, Change in health, Change in activity,   Upcoming invasive procedure, Emergency department visit, Upcoming dental   procedure, Missed doses, Extra doses, Change in medications, Change in   diet/appetite, Hospital admission, Bruising, Other complaints    Comments:   [place]/water x 1 this wk.      Clinical Outcomes     Negatives:   Major bleeding event, Thromboembolic event,   Anticoagulation-related hospital admission, Anticoagulation-related ED   visit, Anticoagulation-related fatality    Comments:   [place]/water x 1 this wk.        INR History:  Anticoagulation Monitoring 2020 5/15/2020 2020   INR 2.80 3.30 2.90   INR Date 2020 5/15/2020 2020   INR Goal 3.0-3.5 3.0-3.5 3.0-3.5   Trend Same Up Up   Last Week Total 47.5 mg 47.5 mg 47.5 mg   Next Week Total 47.5 mg 47.5 mg 50 mg   Sun 7.5 mg 5 mg 7.5 mg   Mon 5 mg 7.5 mg 7.5 mg   Tue  7.5 mg 7.5 mg 7.5 mg   Wed 5 mg 5 mg 5 mg   Thu 7.5 mg 7.5 mg 7.5 mg   Fri 7.5 mg (5/8) 7.5 mg 7.5 mg   Sat 7.5 mg 7.5 mg 7.5 mg   Visit Report - - -   Some recent data might be hidden       Plan:  1. INR is Subtherapeutic today- see above in Anticoagulation Summary.   Will instruct Francisco Sequeira to Increase their warfarin regimen- see above in Anticoagulation Summary.  2. Follow up in 1 week  3. They have been instructed to call if any changes in medications, doses, concerns, etc. Patient expresses understanding and has no further questions at this time.    Vasquez Niño [facility]

## 2020-05-26 ENCOUNTER — APPOINTMENT (OUTPATIENT)
Dept: PHYSICAL THERAPY | Facility: HOSPITAL | Age: 85
End: 2020-05-26

## 2020-05-28 ENCOUNTER — HOSPITAL ENCOUNTER (OUTPATIENT)
Dept: PHYSICAL THERAPY | Facility: HOSPITAL | Age: 85
Setting detail: THERAPIES SERIES
Discharge: HOME | End: 2020-05-28

## 2020-05-28 DIAGNOSIS — R26.89 FUNCTIONAL GAIT ABNORMALITY: ICD-10-CM

## 2020-05-28 DIAGNOSIS — I63.9 CEREBROVASCULAR ACCIDENT (CVA), UNSPECIFIED MECHANISM (HCC): Primary | ICD-10-CM

## 2020-05-28 PROCEDURE — 97112 NEUROMUSCULAR REEDUCATION: CPT

## 2020-05-28 PROCEDURE — 97530 THERAPEUTIC ACTIVITIES: CPT

## 2020-05-28 NOTE — THERAPY TREATMENT NOTE
Outpatient Physical Therapy Neuro Treatment Note  [facility]     Patient Name: Francisco Sequeira  : 1935  MRN: [id number]  Today's Date: 2020      Visit Date: 2020    Visit Dx:    ICD-10-CM ICD-9-CM   1. Cerebrovascular accident (CVA), unspecified mechanism (CMS/HCC) I63.9 434.91   2. Functional gait abnormality R26.89 781.2       Patient Active Problem List   Diagnosis   • Atrial fibrillation (CMS/HCC)   • Hypertension   • Atopic rhinitis   • Uncontrolled type 2 diabetes mellitus (CMS/HCC)   • Gastroesophageal reflux disease   • Hyperlipidemia   • Uncontrolled type 2 diabetes mellitus with complication, with long-term current use of insulin (CMS/HCC)   • Renal insufficiency   • Low testosterone   • Special screening for malignant neoplasm of prostate   • Hx of aortic valve replacement, mechanical [Z95.2]   • Stroke-like symptom   • Kidney carcinoma (CMS/HCC)   • CKD (chronic kidney disease) stage 3, GFR 30-59 ml/min (CMS/HCC)   • BYRON (acute kidney injury) (CMS/HCC)   • Acute cerebral infarction (CMS/HCC)   • Cerebrovascular accident (CVA) due to embolism of right middle cerebral artery (CMS/HCC)           PT Neuro     Row Name 20 0136             Subjective Comments    Subjective Comments  Pt reports he is having no problems.   -LB         Precautions and Contraindications    Precautions  falls   -LB         Subjective Pain    Able to rate subjective pain?  yes  -LB      Pre-Treatment Pain Level  0  -LB      Post-Treatment Pain Level  0  -LB         Cognition    Overall Cognitive Status  WFL  -LB      Memory  Appears intact  -LB      Orientation Level  Oriented X4  -LB         Posture/Observations    Alignment Options  Forward head;Thoracic kyphosis;Lumbar lordosis  -LB      Forward Head  Moderate  -LB      Thoracic Kyphosis  Moderate  -LB      Lumbar lordosis  Decreased  -LB      Posture/Observations Comments  Pt arrived to PT ambulating with his quad tipped cane with CGA of his  wife.   -LB         Transfers    Sit-Stand [place] (Transfers)  conditional independence  -LB      Stand-Sit [place] (Transfers)  conditional independence  -LB      Transfers, Sit-Stand-Sit, Assist Device  other (see comments) quad tipped cane   -LB      Comment (Transfers)  good wt shift forward over LE's and no LOB noted   -LB         Gait/Stairs Assessment/Training    [place] Level (Gait)  stand by assist;contact guard  -LB      Assistive Device (Gait)  other (see comments) quad tipped cane and no device  -LB      Distance in Feet (Gait)  80' x 2 with quad tipped cane, 50' x 2 w/o a device   -LB      Pattern (Gait)  step-through  -LB      Deviations/Abnormal Patterns (Gait)  gait speed decreased;stride length decreased  -LB      Bilateral Gait Deviations  forward flexed posture  -LB      Left Sided Gait Deviations  weight shift ability decreased  -LB      [place] Level (Stairs)  contact guard  -LB      Assistive Device (Stairs)  -- quad tipped cane   -LB      Handrail Location (Stairs)  none  -LB      Number of Steps (Stairs)  4  -LB      Ascending Technique (Stairs)  step-to-step  -LB      Descending Technique (Stairs)  step-to-step  -LB      Stairs, Safety Issues  weight-shifting ability decreased  -LB      Stairs, Impairments  impaired balance  -LB         Balance Skills Training    Standing-Level of Assistance  Contact guard  -LB      Static Standing Balance Support  parallel bars;Right upper extremity supported;Left upper extremity supported light UE support   -LB      Standing-Balance Activities  Feet together;Compliant surfaces;Standing on 1/2 roll  -LB      Gait Balance-Level of Assistance  Contact guard  -LB      Gait Balance Support  assistive device  -LB      Gait Balance Activities  backwards  -LB        User Key  (r) = Recorded By, (t) = Taken By, (c) = Cosigned By    Initials Name Provider Type    Sandi Fink, PT Physical Therapist                  PT Assessment/Plan      Row Name 05/28/20 1835          PT Assessment    Assessment Comments  Pt demonstrated left foot dragging when he ambulated a short distance without a device.   -LB       User Key  (r) = Recorded By, (t) = Taken By, (c) = Cosigned By    Initials Name Provider Type    Sandi Fink PT Physical Therapist             OP Exercises     Row Name 05/28/20 1345             Subjective Comments    Subjective Comments  Pt reports he is having no problems.   -LB         Subjective Pain    Able to rate subjective pain?  yes  -LB      Pre-Treatment Pain Level  0  -LB      Post-Treatment Pain Level  0  -LB        User Key  (r) = Recorded By, (t) = Taken By, (c) = Cosigned By    Initials Name Provider Type    Sandi Fink PT Physical Therapist                          Therapy Education  Education Details: Emphasized to pt he should not ambulate without a device.   Given: Fall prevention and home safety  How Provided: Verbal  Provided to: Patient  Level of Understanding: Verbalized              Time Calculation:   Start Time: 1345  Stop Time: 1430  Time Calculation (min): 45 min  Total Timed Code Minutes- PT: 45 minute(s)   Therapy Charges for Today     Code Description Service Date Service Provider Modifiers Qty    [id number]  PT NEUROMUSC RE EDUCATION EA 15 MIN 5/28/2020 Sandi Reza, PT GP 2    [id number]  PT THERAPEUTIC ACT EA 15 MIN 5/28/2020 Sandi Reza, PT GP 1              Patient was wearing a face mask during this therapy encounter. Therapist used appropriate personal protective equipment including mask and gloves.  Mask used was standard procedure mask. Appropriate PPE was worn during the entire therapy session. Hand hygiene was completed before and after therapy session. Patient is not in enhanced droplet precautions.           Sandi Reza PT  5/28/2020

## 2020-05-29 ENCOUNTER — ANTICOAGULATION VISIT (OUTPATIENT)
Dept: PHARMACY | Facility: HOSPITAL | Age: 85
End: 2020-05-29

## 2020-05-29 DIAGNOSIS — Z95.2 HX OF AORTIC VALVE REPLACEMENT, MECHANICAL: ICD-10-CM

## 2020-05-29 LAB — INR PPP: 3.3

## 2020-05-29 NOTE — PROGRESS NOTES
Anticoagulation Clinic Progress Note    Anticoagulation Summary  As of 5/29/2020    INR goal:   3.0-3.5   TTR:   65.5 % (1.6 y)   INR used for dosing:   3.30 (5/29/2020)   Warfarin maintenance plan:   5 mg every Wed; 7.5 mg all other days   Weekly warfarin total:   50 mg   No change documented:   Florecita Baptiste [facility]   Plan last modified:   Vasquez Niño RPH (5/22/2020)   Next INR check:   6/5/2020   Priority:   Maintenance   Target end date:   Indefinite    Indications    Atrial fibrillation (CMS/HCC) [I48.91]  Hx of aortic valve replacement  mechanical [Z95.2] [Z95.2]             Anticoagulation Episode Summary     INR check location:       Preferred lab:       Send INR reminders to:    CHRIS GUTIERREZ [facility] [place]    Comments:   New INR goal 3 - 3.5 (see 1/2020 hospitalization for CVA)      Anticoagulation Care Providers     Provider Role Specialty Phone number    Griffin Fried MD Referring Cardiology [phone]          Clinic Interview:  No pertinent clinical findings have been reported.    INR History:  Anticoagulation Monitoring 5/15/2020 5/22/2020 5/29/2020   INR 3.30 2.90 3.30   INR Date 5/15/2020 5/22/2020 5/29/2020   INR Goal 3.0-3.5 3.0-3.5 3.0-3.5   Trend Up Up Same   Last Week Total 47.5 mg 47.5 mg 50 mg   Next Week Total 47.5 mg 50 mg 50 mg   Sun 5 mg 7.5 mg 7.5 mg   Mon 7.5 mg 7.5 mg 7.5 mg   Tue 7.5 mg 7.5 mg 7.5 mg   Wed 5 mg 5 mg 5 mg   Thu 7.5 mg 7.5 mg 7.5 mg   Fri 7.5 mg 7.5 mg 7.5 mg   Sat 7.5 mg 7.5 mg 7.5 mg   Visit Report - - -   Some recent data might be hidden       Plan:  1. INR is therapeutic today- see above in Anticoagulation Summary.    Francisco Sequeira to continue their warfarin regimen- see above in Anticoagulation Summary.  2. Follow up in 1 week  3. Pt has agreed to only be called if INR out of range. They have been instructed to call if any changes in medications, doses, concerns, etc. Patient expresses understanding and has no further questions at this time.    Florecita SHEARER  ROSALINDA Baptiste

## 2020-06-02 ENCOUNTER — APPOINTMENT (OUTPATIENT)
Dept: PHYSICAL THERAPY | Facility: HOSPITAL | Age: 85
End: 2020-06-02

## 2020-06-04 ENCOUNTER — HOSPITAL ENCOUNTER (OUTPATIENT)
Dept: PHYSICAL THERAPY | Facility: HOSPITAL | Age: 85
Setting detail: THERAPIES SERIES
Discharge: HOME | End: 2020-06-04

## 2020-06-04 DIAGNOSIS — R26.89 FUNCTIONAL GAIT ABNORMALITY: ICD-10-CM

## 2020-06-04 DIAGNOSIS — I63.9 CEREBROVASCULAR ACCIDENT (CVA), UNSPECIFIED MECHANISM (HCC): Primary | ICD-10-CM

## 2020-06-04 PROCEDURE — 97112 NEUROMUSCULAR REEDUCATION: CPT | Performed by: PHYSICAL THERAPIST

## 2020-06-04 PROCEDURE — 97110 THERAPEUTIC EXERCISES: CPT | Performed by: PHYSICAL THERAPIST

## 2020-06-04 NOTE — THERAPY TREATMENT NOTE
"    Outpatient Physical Therapy Neuro Treatment Note  [facility]     Patient Name: Francisco Sequeira  : 1935  MRN: [id number]  Today's Date: 2020      Visit Date: 2020    Visit Dx:    ICD-10-CM ICD-9-CM   1. Cerebrovascular accident (CVA), unspecified mechanism (CMS/HCC) I63.9 434.91   2. 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 \"I still want  to get to walking without a device\"   -JK         Precautions and Contraindications    Precautions  falls   -JK         Subjective Pain    Able to rate subjective pain?  yes  -JK      Pre-Treatment Pain Level  0  -JK      Post-Treatment Pain Level  0  -JK         Cognition    Overall Cognitive Status  WFL  -JK      Memory  Appears intact  -JK      Orientation Level  Oriented X4  -JK         Posture/Observations    Alignment Options  Forward head;Thoracic kyphosis;Lumbar lordosis  -JK      Forward Head  Moderate  -JK         Transfers    Stand-Sit [place] (Transfers)  conditional independence  -JK      Transfers, Sit-Stand-Sit, Assist Device  -- quad " tip cane  -JK      Comment (Transfers)  uses UEs to push up to stand  -JK         Gait/Stairs Assessment/Training    [place] Level (Gait)  stand by assist;contact guard  -JK      Assistive Device (Gait)  -- quad tip cane  -JK      Distance in Feet (Gait)  100' x 3  -JK      Pattern (Gait)  step-through  -JK      Deviations/Abnormal Patterns (Gait)  gait speed decreased;stride length decreased  -JK      Bilateral Gait Deviations  forward flexed posture  -JK      Left Sided Gait Deviations  weight shift ability decreased  -JK      Comment (Gait/Stairs)  Practiced varying gait speed, making head turns R/L, making head turns up/down, quick turns R/L, unexpected stops.Pt had one LOB with up/down head turns that required PT provide Mod A to regain balance  -JK         Balance Skills Training    Standing-Level of Assistance  Contact guard  -JK      Static Standing Balance Support  parallel bars;Right upper extremity supported;Left upper extremity supported  -JK      Standing-Balance Activities  Feet together;Compliant surfaces  -JK      Gait Balance-Level of Assistance  Contact guard  -JK      Gait Balance Support  assistive device  -JK      Gait Balance Activities  backwards  -JK        User Key  (r) = Recorded By, (t) = Taken By, (c) = Cosigned By    Initials Name Provider Type    Janie Baker, PT Physical Therapist                  PT Assessment/Plan     Row Name 06/04/20 1559          PT Assessment    Functional Limitations  Decreased safety during functional activities;Impaired gait;Limitation in home management;Performance in self-care ADL  -JK     Impairments  Balance;Gait;Muscle strength;Impaired postural alignment  -JK     Assessment Comments  Pt has continued head down posture during gait. He can improve this briefly when given VCs to correct, but does not maintian this posture and goes back to head down gaze. Pt had difficulty when given commands to make an unexpected stops; he can come to a stop but  "has significant forward lean that could cause a LOB forward.   -JK       User Key  (r) = Recorded By, (t) = Taken By, (c) = Cosigned By    Initials Name Provider Type    Janie Baker PT Physical Therapist             OP Exercises     Row Name 06/04/20 1345             Subjective Comments    Subjective Comments  Pt reports no new problems. \"I still want  to get to walking without a device\"   -JK         Subjective Pain    Able to rate subjective pain?  yes  -JK      Pre-Treatment Pain Level  0  -JK      Post-Treatment Pain Level  0  -JK         Exercise 1    Exercise Name 1  standing hip abduction with UE support  -JK      Cueing 1  Verbal  -JK      Sets 1  3  -JK      Reps 1  5  -JK         Exercise 7    Exercise Name 7  mini squats  -JK      Cueing 7  Verbal;Tactile  -JK      Reps 7  10  -JK        User Key  (r) = Recorded By, (t) = Taken By, (c) = Cosigned By    Initials Name Provider Type    Janie Baker PT Physical Therapist                          Therapy Education  Education Details: Encouraged pt that he needs to use an AD any time he is up walking for safety and to prevent falls.  Given: Fall prevention and home safety  Program: Reinforced  How Provided: Verbal  Provided to: Patient  Level of Understanding: Verbalized              Time Calculation:   Start Time: 1345  Stop Time: 1430  Time Calculation (min): 45 min  Total Timed Code Minutes- PT: 45 minute(s)   Therapy Charges for Today     Code Description Service Date Service Provider Modifiers Qty    09803203230  PT NEUROMUSC RE EDUCATION EA 15 MIN 6/4/2020 Janie Ash, PT GP 2    [id number]  PT THER PROC EA 15 MIN 6/4/2020 Janie Ash PT GP 1                    Janie Ash PT  6/4/2020     "

## 2020-06-05 ENCOUNTER — ANTICOAGULATION VISIT (OUTPATIENT)
Dept: PHARMACY | Facility: HOSPITAL | Age: 85
End: 2020-06-05

## 2020-06-05 DIAGNOSIS — I63.411 CEREBROVASCULAR ACCIDENT (CVA) DUE TO EMBOLISM OF RIGHT MIDDLE CEREBRAL ARTERY (HCC): ICD-10-CM

## 2020-06-05 DIAGNOSIS — Z95.2 HX OF AORTIC VALVE REPLACEMENT, MECHANICAL: ICD-10-CM

## 2020-06-05 LAB — INR PPP: 3.7

## 2020-06-05 NOTE — PROGRESS NOTES
Anticoagulation Clinic Progress Note    Anticoagulation Summary  As of 6/5/2020    INR goal:   3.0-3.5   TTR:   65.3 % (1.7 y)   INR used for dosing:   3.70! (6/5/2020)   Warfarin maintenance plan:   5 mg every Wed; 7.5 mg all other days   Weekly warfarin total:   50 mg   Plan last modified:   Vasquez Niño RPH (5/22/2020)   Next INR check:   6/12/2020   Priority:   Maintenance   Target end date:   Indefinite    Indications    Hx of aortic valve replacement  mechanical [Z95.2] [Z95.2]  Atrial fibrillation (CMS/HCC) [I48.91]  Cerebrovascular accident (CVA) due to embolism of right middle cerebral artery (CMS/HCC) [I63.411]             Anticoagulation Episode Summary     INR check location:       Preferred lab:       Send INR reminders to:    [facility]    Comments:   New INR goal 3 - 3.5 (see 1/2020 hospitalization for CVA)      Anticoagulation Care Providers     Provider Role Specialty Phone number    Griffin Fried MD Referring Cardiology [phone]          Clinic Interview:  Patient Findings     Negatives:   Signs/symptoms of thrombosis, Signs/symptoms of bleeding,   Laboratory test error suspected, Change in health, Change in alcohol use,   Change in activity, Upcoming invasive procedure, Emergency department   visit, Upcoming dental procedure, Missed doses, Extra doses, Change in   medications, Change in diet/appetite, Hospital admission, Bruising, Other   complaints      Clinical Outcomes     Negatives:   Major bleeding event, Thromboembolic event,   Anticoagulation-related hospital admission, Anticoagulation-related ED   visit, Anticoagulation-related fatality        INR History:  Anticoagulation Monitoring 5/22/2020 5/29/2020 6/5/2020   INR 2.90 3.30 3.70   INR Date 5/22/2020 5/29/2020 6/5/2020   INR Goal 3.0-3.5 3.0-3.5 3.0-3.5   Trend Up Same Same   Last Week Total 47.5 mg 50 mg 50 mg   Next Week Total 50 mg 50 mg 47.5 mg   Sun 7.5 mg 7.5 mg 7.5 mg   Mon 7.5 mg 7.5 mg 7.5 mg   Tue  7.5 mg 7.5 mg 7.5 mg   Wed 5 mg 5 mg 5 mg   Thu 7.5 mg 7.5 mg 7.5 mg   Fri 7.5 mg 7.5 mg 5 mg (6/5)   Sat 7.5 mg 7.5 mg 7.5 mg   Visit Report - - -   Some recent data might be hidden       Plan:  1. INR is Supratherapeutic today- see above in Anticoagulation Summary.   Will instruct Francisco Sequeira to Change their warfarin regimen- see above in Anticoagulation Summary.  2. Follow up in 1 week  3. They have been instructed to call if any changes in medications, doses, concerns, etc. Patient expresses understanding and has no further questions at this time.    Vasquez Niño [facility]

## 2020-06-09 ENCOUNTER — APPOINTMENT (OUTPATIENT)
Dept: PHYSICAL THERAPY | Facility: HOSPITAL | Age: 85
End: 2020-06-09

## 2020-06-11 ENCOUNTER — HOSPITAL ENCOUNTER (OUTPATIENT)
Dept: PHYSICAL THERAPY | Facility: HOSPITAL | Age: 85
Setting detail: THERAPIES SERIES
Discharge: HOME | End: 2020-06-11

## 2020-06-11 DIAGNOSIS — R26.89 FUNCTIONAL GAIT ABNORMALITY: ICD-10-CM

## 2020-06-11 DIAGNOSIS — I63.9 CEREBROVASCULAR ACCIDENT (CVA), UNSPECIFIED MECHANISM (HCC): Primary | ICD-10-CM

## 2020-06-11 PROCEDURE — 97112 NEUROMUSCULAR REEDUCATION: CPT

## 2020-06-11 NOTE — THERAPY DISCHARGE NOTE
Outpatient Physical Therapy Neuro Treatment Note/Discharge Summary  [facility]     Patient Name: Francisco Sequeira  : 1935  MRN: [id number]  Today's Date: 2020      Visit Date: 2020    Visit Dx:    ICD-10-CM ICD-9-CM   1. Cerebrovascular accident (CVA), unspecified mechanism (CMS/HCC) I63.9 434.91   2. 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 Pt's wife reports he walked up and down the counter without holding on with her next to him to work on getting stronger.   -LB         Precautions and Contraindications    Precautions  falls   -LB         Subjective Pain    Able to rate subjective pain?  yes  -LB      Pre-Treatment Pain Level  0  -LB      Post-Treatment Pain Level  0  -LB         Cognition    Overall Cognitive Status  WFL  -LB      Memory  Appears intact  -LB      Orientation Level  Oriented X4  -LB      Safety Judgment  Good awareness of safety precautions  -LB      Deficits  Fully aware of deficits  -LB      Comments  Recommended pt continue with use of the cane and walker.   -LB          Posture/Observations    Alignment Options  Forward head;Thoracic kyphosis;Lumbar lordosis  -LB      Forward Head  Moderate  -LB         MMT Left Lower Ext    Lt Hip Flexion MMT, Gross Movement  (5/5) normal  -LB      Lt Hip Extension MMT, Gross Movement  -- maximal resistance in sidelying  -LB      Lt Hip ABduction MMT, Gross Movement  (5/5) normal  -LB      Lt Knee Extension MMT, Gross Movement  (5/5) normal  -LB      Lt Knee Flexion MMT, Gross Movement  other (see comments) maximal resistance in sidelying  -LB      Lt Ankle Dorsiflexion MMT, Gross Movement  (5/5) normal  -LB      Lt Ankle Subtalar Inversion MMT, Gross Movement  (5/5) normal  -LB      Lt Ankle Subtalar Eversion MMT, Gross Movement  (5/5) normal  -LB         Bed Mobility Assessment/Treatment    Rolling Left [place] (Bed Mobility)  independent  -LB      Rolling Right [place] (Bed Mobility)  independent  -LB      Supine-Sit [place] (Bed Mobility)  independent  -LB      Sit-Supine [place] (Bed Mobility)  independent  -LB      Comment (Bed Mobility)  Per wife pt going to try sleeping in the bed this weekend and she will put the walker by the bed.   -LB         Transfers    Sit-Stand [place] (Transfers)  conditional independence defintie need of UE's   -LB      Stand-Sit [place] (Transfers)  conditional independence defintie need of UE's   -LB      Comment (Transfers)  PT demonstrated floor transfers to pt and wife.   -LB         Gait/Stairs Assessment/Training    [place] Level (Gait)  supervision;conditional independence  -LB      Assistive Device (Gait)  other (see comments) quad tipped cane   -LB      Distance in Feet (Gait)  160' x 2   -LB      Pattern (Gait)  step-through  -LB      Deviations/Abnormal Patterns (Gait)  gait speed decreased;stride length decreased  -LB      Bilateral Gait Deviations  forward flexed posture  -LB      Left Sided Gait Deviations  weight shift ability decreased  -LB       [place] Level (Stairs)  contact guard  -LB      Assistive Device (Stairs)  -- quad tipped cane   -LB      Handrail Location (Stairs)  none  -LB      Number of Steps (Stairs)  4  -LB      Ascending Technique (Stairs)  step-to-step  -LB      Descending Technique (Stairs)  step-to-step  -LB      Stairs, Safety Issues  weight-shifting ability decreased  -LB      Stairs, Impairments  impaired balance  -LB      Comment (Gait/Stairs)  see Dynamic Gait Index, see Tinetti  STAIRS with one rail conditional independent step to step technique  CURB -- pt with quad tipped cane with CGA.    -LB         Balance Skills Training    Balance Comments  see SEVILLA , see Tinetti  -LB        User Key  (r) = Recorded By, (t) = Taken By, (c) = Cosigned By    Initials Name Provider Type    Sandi Fink, PT Physical Therapist                  PT Assessment/Plan     Row Name 06/11/20 1825          PT Assessment    Assessment Comments  Pt has made significant improvement with transfers, gaitand balance as indicated by the Tinetti, SEVILLA and the Dynamic Gait Index. However, the outcome measure scores place pt at risk for falls and PT strongly recommended pt use a device with all ambulation.  Pt and his wife comprehend the importance of using an assistive device with ambulation.   -LB       User Key  (r) = Recorded By, (t) = Taken By, (c) = Cosigned By    Initials Name Provider Type    Sandi Fink PT Physical Therapist               OP Exercises     Row Name 06/11/20 6196             Subjective Comments    Subjective Comments  Pt and wife denied falls. Pt's wife reports he walked up and down the counter without holding on with her next to him to work on getting stronger.   -LB         Subjective Pain    Able to rate subjective pain?  yes  -LB      Pre-Treatment Pain Level  0  -LB      Post-Treatment Pain Level  0  -LB        User Key  (r) = Recorded By, (t) = Taken By, (c) = Cosigned By    Initials Name Provider Type    ALYCE Reza  Sandi RICE, PT Physical Therapist                        PT OP Goals     Row Name 06/11/20 1346          PT Short Term Goals    STG 1  Pt to come to stand from an armless chair to his walker conditional independent.   -LB     STG 1 Progress  Met  -LB     STG 2  Pt to perform sit to stand and stand to sit with increased weight shift over his LE's.   -LB     STG 2 Progress  Met  -LB     STG 3  Pt to ambulate with decreased increased left toe clearance.   -LB     STG 3 Progress  Met  -LB     STG 4  Pt to improve score on the SEVILLA to 36/56 to improve balance and reduce risk of falls.  -LB     STG 4 Progress  Met  -LB     STG 4 Progress Comments  Pt scored a 46/56.   -LB     STG 5  Pt to improve score on the Dynamic Gait Index to 12/24 to improve balance and reduce risk of falls.   -LB     STG 5 Progress  Met  -LB     STG 5 Progress Comments  Pt scored a15/24.   -LB     STG 6  Pt to ascend and descend 4 steps with one rail step to step with CGA with correct sequencing.   -LB     STG 6 Progress  Met  -LB     STG 7  Pt to perform floor transfers with UE support with minimal of 1.   -LB     STG 7 Progress  Met  -LB     STG 8  Pt to ambulate ~ 250' with rolling walker with left heelstrike and stride length.   -LB     STG 8 Progress  Met  -LB        Long Term Goals    LTG 1  Pt to come to stand from an armless chair with least restricitve device conditional independent.   -LB     LTG 1 Progress  Met  -LB     LTG 2  Pt to complete the TUG in < 15 seconds while ambulating with least restrictive device.   -LB     LTG 2 Progress  Not Met  -LB     LTG 3  Pt to increase strength left hip flexors and abductors to 5/5.   -LB     LTG 3 Progress  Met  -LB     LTG 4  Pt to improve score on the SEVILLA to 41/56 to improve balance and reduce risk of falls.  -LB     LTG 4 Progress  Met  -LB     LTG 4 Progress Comments  Pt scored a 46/56.   -LB     LTG 5  Pt to improve score on the Dynamic Gait Index to 17/24 to improve balance and reduce risk of  falls.  -LB     LTG 5 Progress  Not Met  -LB     LTG 5 Progress Comments  Pt's score imporved to a 15/24.   -LB     LTG 6  Pt to ascend and descend 3 steps with least restrictive device with SBA.   -LB     LTG 6 Progress  Partially Met  -LB     LTG 6 Progress Comments  Pt required CGA with quad tipped cane.   -LB     LTG 7  Pt to perform floor transfers with CGA to minimal of 1.   -LB     LTG 7 Progress  Partially Met  -LB     LTG 7 Progress Comments  Pt required minimal.  -LB     LTG 8  Pt to ambulate >500' with least restricitve device on level surfaces conditional independent.   -LB     LTG 8 Progress  Not Met  -LB     LTG 9  Pt to ascend and descend a curb with quad tipped cane with CGA.   -LB     LTG 9 Progress  Met  -LB       User Key  (r) = Recorded By, (t) = Taken By, (c) = Cosigned By    Initials Name Provider Type    Sandi Fink, PT Physical Therapist          Therapy Education  Education Details: PT demonstrated floor transfers to pt and wife. PT discussed outcome measures to pt and wife which indicated pt is at risk for falls and pt should continue ambulation with his cane and walker.   Given: Mobility training  How Provided: Verbal, Demonstration  Provided to: Patient, Caregiver  Level of Understanding: Verbalized, Teach back education performed, Demonstrated    Harp Balance Scale  Sitting to Standing: able to stand independently using hands  Standing Unsupported: able to stand safely for 2 minutes  Sitting with Back Unsupported but Feet Supported on Floor or on Stool: able to sit safely and securely for 2 minutes  Standing to Sitting: sits safely with minimal use of hands  Transfers: able to transfer safely with minor use of hands  Standing Unsupported with Eyes Closed: able to stand 10 seconds safely  Standing Unsupported with Feet Together: able to place feet together independently and stand 1 minute with supervision  Reaching Forward with Outstretched Arm While Standing: can reach forward  confidently 25 cm (10 inches)   Object From the Floor From a Standing Position: able to  object safely and easily  Turning to Look Behind Over Left and Right Shoulders While Standing: looks behind from both sides and weight shifts well  Turn 360 Degrees: able to turn 360 degrees safely one side only 4 seconds or less  Place Alternate Foot on Step or Stool While Standing Unsupported: able to complete 4 steps without aid with supervision  Standing Unsupported with One Foot in Front: able to take small step independently and hold 30 seconds  Standing on One Leg: tries to lift leg unable to hold 3 seconds but remains standing independently  Harp Total Score: 46  Harp Comments: fall risk   Dynamic Gait Index (DGI)  Gait Level Surface: Mild impairment: walks 20’, uses assistive devices, slower speed, mild gait deviations  Change in Gait Speed: Mild impairment: Is able to change speed but demonstrates mild gait deviations, or no gait deviations but unable to achieve a significant change in velocity, or uses as assistive device  Gait with Horizontal Head Turns: Mild impairment: Performs head turns smoothly with slight change in gait velocity, i.e. minor disruption to smooth gait path or uses walking aid.  Gait with Vertical Head Turns: Mild impairment: Performs head turns smoothly with slight change in gait velocity, i.e. minor disruption to smooth gait path or uses walking aid.  Gait and Pivot Turn: Mild impairment: pivot turns safely in >3 seconds and stops with no loss of balance.  Step Over Obstacle: Mild impairment: Is able to step over shoe box, but must slow down and adjust steps to clear box safely.  Step Around Obstacles: Mild impairment: Is able to step around both cones, but must slow down and adjust steps to clear cones.  Steps: Moderate impairment: Two feet to a stair, must use rail.  Dynamic Gait Index Score: 15  Dynamic Gait Index Comments: with quad tipped cane   Tinetti Assessment  Tinetti  "Assessment: yes  Sitting Balance: Steady,safe  Arises: Able, uses arms  Attempts to Rise: Able in 1 attempt  Immediate Standing Balance (first 5 sec): Steady without support  Standing Balance: Narrow stance, without support  Sternal Nudge (feet close together): Steady  Eyes Closed (feet close together): Steady  Turning 360 Degrees- Steps: Continuous steps  Turning 360 Degrees- Steadiness: Steady  Sitting Down: Uses arms or not a smooth motion  Tinetti Balance Score: 14  Gait Initiation (immediate after told \"go\"): No hesitancy  Step Length- Right Swing: Right swing foot does not pass Left stance leg(50% of the time )  Step Length- Left Swing: Left swing foot passes Right  Foot Clearance- Right Foot: Right foot completely clears floor  Foot Clearance- Left Foot: Left foot completely clears floor  Step Symmetry: Right and Left step length equal  Step Continuity: Steps appear continuous  Path (excursion): Mild/moderate deviation or use of aid  Trunk: Marked sway or uses device  Base of Support: Heels close while walking  Gait Score: 8  Tinetti Total Score: 22  Tinetti Assistive Device: other (see comments)(quad tipped cane )    Time Calculation:   Start Time: 1346  Stop Time: 1430  Time Calculation (min): 44 min  Total Timed Code Minutes- PT: 44 minute(s)     Therapy Charges for Today     Code Description Service Date Service Provider Modifiers Qty    [id number] HC PT NEUROMUSC RE EDUCATION EA 15 MIN 6/11/2020 Sandi Reza, PT GP 3          PT G-Codes  Harp Total Score: 46  Tinetti Total Score: 22     OP PT Discharge Summary  Date of Discharge: 06/11/20  Reason for Discharge: Maximum functional potential achieved  Outcomes Achieved: Patient able to partially acheive established goals  Discharge Destination: Home with home program        Sandi Reza, PT  6/11/2020       "

## 2020-06-12 ENCOUNTER — ANTICOAGULATION VISIT (OUTPATIENT)
Dept: PHARMACY | Facility: HOSPITAL | Age: 85
End: 2020-06-12

## 2020-06-12 DIAGNOSIS — Z95.2 HX OF AORTIC VALVE REPLACEMENT, MECHANICAL: ICD-10-CM

## 2020-06-12 DIAGNOSIS — I63.411 CEREBROVASCULAR ACCIDENT (CVA) DUE TO EMBOLISM OF RIGHT MIDDLE CEREBRAL ARTERY (HCC): ICD-10-CM

## 2020-06-12 LAB — INR PPP: 3.2

## 2020-06-12 NOTE — PROGRESS NOTES
Anticoagulation Clinic Progress Note    Anticoagulation Summary  As of 6/12/2020    INR goal:   3.0-3.5   TTR:   65.3 % (1.7 y)   INR used for dosing:   3.20 (6/12/2020)   Warfarin maintenance plan:   5 mg every Wed; 7.5 mg all other days   Weekly warfarin total:   50 mg   No change documented:   Vasquez Niño RPH   Plan last modified:   Vasquez Niño RPH (5/22/2020)   Next INR check:   6/19/2020   Priority:   Maintenance   Target end date:   Indefinite    Indications    Hx of aortic valve replacement  mechanical [Z95.2] [Z95.2]  Atrial fibrillation (CMS/HCC) [I48.91]  Cerebrovascular accident (CVA) due to embolism of right middle cerebral artery (CMS/HCC) [I63.411]             Anticoagulation Episode Summary     INR check location:       Preferred lab:       Send INR reminders to:    [facility]    Comments:   New INR goal 3 - 3.5 (see 1/2020 hospitalization for CVA)      Anticoagulation Care Providers     Provider Role Specialty Phone number    Griffin Fried MD Referring Cardiology [phone]          Clinic Interview:  Patient Findings     Negatives:   Signs/symptoms of thrombosis, Signs/symptoms of bleeding,   Laboratory test error suspected, Change in health, Change in alcohol use,   Change in activity, Upcoming invasive procedure, Emergency department   visit, Upcoming dental procedure, Missed doses, Extra doses, Change in   medications, Change in diet/appetite, Hospital admission, Bruising, Other   complaints      Clinical Outcomes     Negatives:   Major bleeding event, Thromboembolic event,   Anticoagulation-related hospital admission, Anticoagulation-related ED   visit, Anticoagulation-related fatality        INR History:  Anticoagulation Monitoring 5/29/2020 6/5/2020 6/12/2020   INR 3.30 3.70 3.20   INR Date 5/29/2020 6/5/2020 6/12/2020   INR Goal 3.0-3.5 3.0-3.5 3.0-3.5   Trend Same Same Same   Last Week Total 50 mg 50 mg 47.5 mg   Next Week Total 50 mg 47.5 mg 50 mg   Sun 7.5 mg  7.5 mg 7.5 mg   Mon 7.5 mg 7.5 mg 7.5 mg   Tue 7.5 mg 7.5 mg 7.5 mg   Wed 5 mg 5 mg 5 mg   Thu 7.5 mg 7.5 mg 7.5 mg   Fri 7.5 mg 5 mg (6/5) 7.5 mg   Sat 7.5 mg 7.5 mg 7.5 mg   Visit Report - - -   Some recent data might be hidden       Plan:  1. INR is Therapeutic today- see above in Anticoagulation Summary.   Will instruct Francisco Sequeira to Continue their warfarin regimen- see above in Anticoagulation Summary.  2. Follow up in 1 week  3. They have been instructed to call if any changes in medications, doses, concerns, etc. Patient expresses understanding and has no further questions at this time.    Vasquez Niño [facility]

## 2020-06-16 ENCOUNTER — APPOINTMENT (OUTPATIENT)
Dept: PHYSICAL THERAPY | Facility: HOSPITAL | Age: 85
End: 2020-06-16

## 2020-06-18 ENCOUNTER — APPOINTMENT (OUTPATIENT)
Dept: PHYSICAL THERAPY | Facility: HOSPITAL | Age: 85
End: 2020-06-18

## 2020-06-19 ENCOUNTER — ANTICOAGULATION VISIT (OUTPATIENT)
Dept: PHARMACY | Facility: HOSPITAL | Age: 85
End: 2020-06-19

## 2020-06-19 DIAGNOSIS — Z95.2 HX OF AORTIC VALVE REPLACEMENT, MECHANICAL: ICD-10-CM

## 2020-06-19 DIAGNOSIS — I63.411 CEREBROVASCULAR ACCIDENT (CVA) DUE TO EMBOLISM OF RIGHT MIDDLE CEREBRAL ARTERY (HCC): ICD-10-CM

## 2020-06-19 LAB — INR PPP: 4.9

## 2020-06-19 NOTE — PROGRESS NOTES
Anticoagulation Clinic Progress Note    Anticoagulation Summary  As of 2020    INR goal:   3.0-3.5   TTR:   64.7 % (1.7 y)   INR used for dosin.90! (2020)   Warfarin maintenance plan:   5 mg every Wed; 7.5 mg all other days   Weekly warfarin total:   50 mg   Plan last modified:   Vasquez Niño RPH (2020)   Next INR check:   2020   Priority:   Maintenance   Target end date:   Indefinite    Indications    Hx of aortic valve replacement  mechanical [Z95.2] [Z95.2]  Atrial fibrillation (CMS/HCC) [I48.91]  Cerebrovascular accident (CVA) due to embolism of right middle cerebral artery (CMS/HCC) [I63.411]             Anticoagulation Episode Summary     INR check location:       Preferred lab:       Send INR reminders to:    [facility]    Comments:   New INR goal 3 - 3.5 (see 2020 hospitalization for CVA)      Anticoagulation Care Providers     Provider Role Specialty Phone number    Griffin Fried MD Referring Cardiology [phone]          Clinic Interview:  Patient Findings     Positives:   Change in alcohol use, Change in diet/appetite    Negatives:   Signs/symptoms of thrombosis, Signs/symptoms of bleeding,   Laboratory test error suspected, Change in health, Change in activity,   Upcoming invasive procedure, Emergency department visit, Upcoming dental   procedure, Missed doses, Extra doses, Change in medications, Hospital   admission, Bruising, Other complaints    Comments:   Glass of wine Tues, salad Tues. Reports small serving of   dried cranberries past two days.       Clinical Outcomes     Negatives:   Major bleeding event, Thromboembolic event,   Anticoagulation-related hospital admission, Anticoagulation-related ED   visit, Anticoagulation-related fatality    Comments:   Glass of wine Tues, salad Tues. Reports small serving of   dried cranberries past two days.         INR History:  Anticoagulation Monitoring 2020   INR 3.70 3.20 4.90    INR Date 6/5/2020 6/12/2020 6/19/2020   INR Goal 3.0-3.5 3.0-3.5 3.0-3.5   Trend Same Same Same   Last Week Total 50 mg 47.5 mg 50 mg   Next Week Total 47.5 mg 50 mg 42.5 mg   Sun 7.5 mg 7.5 mg 7.5 mg   Mon 7.5 mg 7.5 mg 7.5 mg   Tue 7.5 mg 7.5 mg 7.5 mg   Wed 5 mg 5 mg 5 mg   Thu 7.5 mg 7.5 mg 7.5 mg   Fri 5 mg (6/5) 7.5 mg Hold (6/19)   Sat 7.5 mg 7.5 mg 7.5 mg   Visit Report - - -   Some recent data might be hidden       Plan:  1. INR is Supratherapeutic today- see above in Anticoagulation Summary.   Will instruct Francisco Sequeira to Change their warfarin regimen- see above in Anticoagulation Summary.  2. Follow up in 1 week  3. They have been instructed to call if any changes in medications, doses, concerns, etc. To seek immediate medical attention if s/sx of bleeding develop or fall occurs. Patient expresses understanding and has no further questions at this time.    Vasquez Niño [facility]

## 2020-06-26 ENCOUNTER — ANTICOAGULATION VISIT (OUTPATIENT)
Dept: PHARMACY | Facility: HOSPITAL | Age: 85
End: 2020-06-26

## 2020-06-26 DIAGNOSIS — Z95.2 HX OF AORTIC VALVE REPLACEMENT, MECHANICAL: ICD-10-CM

## 2020-06-26 DIAGNOSIS — I63.411 CEREBROVASCULAR ACCIDENT (CVA) DUE TO EMBOLISM OF RIGHT MIDDLE CEREBRAL ARTERY (HCC): ICD-10-CM

## 2020-06-26 LAB — INR PPP: 2.9

## 2020-06-26 NOTE — PROGRESS NOTES
Anticoagulation Clinic Progress Note    Anticoagulation Summary  As of 2020    INR goal:   3.0-3.5   TTR:   64.3 % (1.7 y)   INR used for dosin.90! (2020)   Warfarin maintenance plan:   5 mg every Wed; 7.5 mg all other days   Weekly warfarin total:   50 mg   No change documented:   Vasquez Niño RPH   Plan last modified:   Vasquez Niño RPH (2020)   Next INR check:   7/3/2020   Priority:   Maintenance   Target end date:   Indefinite    Indications    Hx of aortic valve replacement  mechanical [Z95.2] [Z95.2]  Atrial fibrillation (CMS/HCC) [I48.91]  Cerebrovascular accident (CVA) due to embolism of right middle cerebral artery (CMS/HCC) [I63.411]             Anticoagulation Episode Summary     INR check location:       Preferred lab:       Send INR reminders to:    [facility]    Comments:   New INR goal 3 - 3.5 (see 2020 hospitalization for CVA)      Anticoagulation Care Providers     Provider Role Specialty Phone number    Griffin Fried MD Referring Cardiology [phone]          Clinic Interview:  Patient Findings     Negatives:   Signs/symptoms of thrombosis, Signs/symptoms of bleeding,   Laboratory test error suspected, Change in health, Change in alcohol use,   Change in activity, Upcoming invasive procedure, Emergency department   visit, Upcoming dental procedure, Missed doses, Extra doses, Change in   medications, Change in diet/appetite, Hospital admission, Bruising, Other   complaints      Clinical Outcomes     Negatives:   Major bleeding event, Thromboembolic event,   Anticoagulation-related hospital admission, Anticoagulation-related ED   visit, Anticoagulation-related fatality        INR History:  Anticoagulation Monitoring 2020   INR 3.20 4.90 2.90   INR Date 2020   INR Goal 3.0-3.5 3.0-3.5 3.0-3.5   Trend Same Same Same   Last Week Total 47.5 mg 50 mg 42.5 mg   Next Week Total 50 mg 42.5 mg 50 mg   Sun 7.5  mg 7.5 mg 7.5 mg   Mon 7.5 mg 7.5 mg 7.5 mg   Tue 7.5 mg 7.5 mg 7.5 mg   Wed 5 mg 5 mg 5 mg   Thu 7.5 mg 7.5 mg 7.5 mg   Fri 7.5 mg Hold (6/19) 7.5 mg   Sat 7.5 mg 7.5 mg 7.5 mg   Visit Report - - -   Some recent data might be hidden       Plan:  1. INR is Subtherapeutic today- see above in Anticoagulation Summary.   Will instruct Francisco Sequeira to Continue their warfarin regimen- see above in Anticoagulation Summary.  2. Follow up in 1 week  3. They have been instructed to call if any changes in medications, doses, concerns, etc. Patient expresses understanding and has no further questions at this time.    Vasquez Niño [facility]

## 2020-06-28 RX ORDER — DIGOXIN 125 MCG
TABLET ORAL
Qty: 90 TABLET | Refills: 0 | Status: SHIPPED | OUTPATIENT
Start: 2020-06-28 | End: 2020-09-22

## 2020-07-03 ENCOUNTER — ANTICOAGULATION VISIT (OUTPATIENT)
Dept: PHARMACY | Facility: HOSPITAL | Age: 85
End: 2020-07-03

## 2020-07-03 DIAGNOSIS — Z95.2 HX OF AORTIC VALVE REPLACEMENT, MECHANICAL: ICD-10-CM

## 2020-07-03 DIAGNOSIS — I63.411 CEREBROVASCULAR ACCIDENT (CVA) DUE TO EMBOLISM OF RIGHT MIDDLE CEREBRAL ARTERY (HCC): ICD-10-CM

## 2020-07-03 LAB — INR PPP: 3.7

## 2020-07-03 NOTE — PROGRESS NOTES
Anticoagulation Clinic Progress Note    Anticoagulation Summary  As of 7/3/2020    INR goal:   3.0-3.5   TTR:   64.3 % (1.7 y)   INR used for dosing:   3.70! (7/3/2020)   Warfarin maintenance plan:   5 mg every Wed, Fri; 7.5 mg all other days   Weekly warfarin total:   47.5 mg   Plan last modified:   Ranjit Cunningham, ROSALINDA (7/3/2020)   Next INR check:   7/10/2020   Priority:   Maintenance   Target end date:   Indefinite    Indications    Hx of aortic valve replacement  mechanical [Z95.2] [Z95.2]  Atrial fibrillation (CMS/HCC) [I48.91]  Cerebrovascular accident (CVA) due to embolism of right middle cerebral artery (CMS/HCC) [I63.411]             Anticoagulation Episode Summary     INR check location:       Preferred lab:       [facility]    Comments:   New INR goal 3 - 3.5 (see 1/2020 hospitalization for CVA)      Anticoagulation Care Providers     Provider Role Specialty Phone number    Griffin Fried MD Referring Cardiology [phone]          Clinic Interview:  Patient Findings     Negatives:   Signs/symptoms of thrombosis, Signs/symptoms of bleeding,   Laboratory test error suspected, Change in health, Change in alcohol use,   Change in activity, Upcoming invasive procedure, Emergency department   visit, Upcoming dental procedure, Missed doses, Extra doses, Change in   medications, Change in diet/appetite, Hospital admission, Bruising, Other   complaints      Clinical Outcomes     Negatives:   Major bleeding event, Thromboembolic event,   Anticoagulation-related hospital admission, Anticoagulation-related ED   visit, Anticoagulation-related fatality        INR History:  Anticoagulation Monitoring 6/19/2020 6/26/2020 7/3/2020   INR 4.90 2.90 3.70   INR Date 6/19/2020 6/26/2020 7/3/2020   INR Goal 3.0-3.5 3.0-3.5 3.0-3.5   Trend Same Same Down   Last Week Total 50 mg 42.5 mg 50 mg   Next Week Total 42.5 mg 50 mg 47.5 mg   Sun 7.5 mg 7.5 mg 7.5 mg   Mon 7.5 mg 7.5 mg  7.5 mg   Tue 7.5 mg 7.5 mg 7.5 mg   Wed 5 mg 5 mg 5 mg   Thu 7.5 mg 7.5 mg 7.5 mg   Fri Hold (6/19) 7.5 mg 5 mg   Sat 7.5 mg 7.5 mg 7.5 mg   Visit Report - - -   Some recent data might be hidden       Plan:  1. INR is Supratherapeutic today- see above in Anticoagulation Summary.   Will instruct Francisco Sequeira to Change their warfarin regimen to 5 mg on M/F with 7.5 mg the rest of the week (5% dose decrease)- see above in Anticoagulation Summary.  2. Follow up in 1 weeks  3.  Pt has been instructed to call if any changes in medications, doses, concerns, etc. Patient expresses understanding and has no further questions at this time.    Ranjit Cunningham [facility]

## 2020-07-10 ENCOUNTER — ANTICOAGULATION VISIT (OUTPATIENT)
Dept: PHARMACY | Facility: HOSPITAL | Age: 85
End: 2020-07-10

## 2020-07-10 DIAGNOSIS — I63.411 CEREBROVASCULAR ACCIDENT (CVA) DUE TO EMBOLISM OF RIGHT MIDDLE CEREBRAL ARTERY (HCC): ICD-10-CM

## 2020-07-10 DIAGNOSIS — Z95.2 HX OF AORTIC VALVE REPLACEMENT, MECHANICAL: ICD-10-CM

## 2020-07-10 LAB — INR PPP: 3.5

## 2020-07-17 ENCOUNTER — ANTICOAGULATION VISIT (OUTPATIENT)
Dept: PHARMACY | Facility: HOSPITAL | Age: 85
End: 2020-07-17

## 2020-07-17 DIAGNOSIS — Z95.2 HX OF AORTIC VALVE REPLACEMENT, MECHANICAL: ICD-10-CM

## 2020-07-17 DIAGNOSIS — I63.411 CEREBROVASCULAR ACCIDENT (CVA) DUE TO EMBOLISM OF RIGHT MIDDLE CEREBRAL ARTERY (HCC): ICD-10-CM

## 2020-07-17 LAB — INR PPP: 4

## 2020-07-17 NOTE — PROGRESS NOTES
Anticoagulation Clinic Progress Note    Anticoagulation Summary  As of 2020    INR goal:   3.0-3.5   TTR:   62.9 % (1.8 y)   INR used for dosin.00! (2020)   Warfarin maintenance plan:   5 mg every Tue, Fri; 7.5 mg all other days   Weekly warfarin total:   47.5 mg   Plan last modified:   Vasquez Niño RPH (7/10/2020)   Next INR check:   2020   Priority:   Maintenance   Target end date:   Indefinite    Indications    Hx of aortic valve replacement  mechanical [Z95.2] [Z95.2]  Atrial fibrillation (CMS/HCC) [I48.91]  Cerebrovascular accident (CVA) due to embolism of right middle cerebral artery (CMS/HCC) [I63.411]             Anticoagulation Episode Summary     INR check location:       Preferred lab:       Send INR reminders to:    [facility] POOL    Comments:   New INR goal 3 - 3.5 (see 2020 hospitalization for CVA)      Anticoagulation Care Providers     Provider Role Specialty Phone number    Griffin Fried MD Referring Cardiology [phone]          Clinic Interview:      INR History:  Anticoagulation Monitoring 7/3/2020 7/10/2020 2020   INR 3.70 3.50 4.00   INR Date 7/3/2020 7/10/2020 2020   INR Goal 3.0-3.5 3.0-3.5 3.0-3.5   Trend Down Same Same   Last Week Total 50 mg 47.5 mg 47.5 mg   Next Week Total 47.5 mg 47.5 mg 45 mg   Sun 7.5 mg 7.5 mg 7.5 mg   Mon 7.5 mg 7.5 mg 7.5 mg   Tue 7.5 mg 5 mg 5 mg   Wed 5 mg 7.5 mg 7.5 mg   Thu 7.5 mg 7.5 mg 7.5 mg   Fri 5 mg 5 mg 2.5 mg ()   Sat 7.5 mg 7.5 mg 7.5 mg   Visit Report - - -   Some recent data might be hidden       Plan:  1. INR is Supratherapeutic today- see above in Anticoagulation Summary.   Will instruct Francisco Sequeira to reduce today's warfarin dose to 2.5mg then continue their warfarin regimen- see above in Anticoagulation Summary.  2. Follow up in 1 week  3. Spoke with spouse today. They have been instructed to call if any changes in medications, doses, concerns, etc. Patient expresses understanding  and has no further questions at this time.    Audra Vazquez, [facility]

## 2020-07-23 DIAGNOSIS — IMO0002 UNCONTROLLED TYPE 2 DIABETES MELLITUS WITH COMPLICATION, WITH LONG-TERM CURRENT USE OF INSULIN: ICD-10-CM

## 2020-07-23 RX ORDER — SUB-Q INSULIN DEVICE, 40 UNIT
EACH MISCELLANEOUS
Qty: 30 EACH | Refills: 3 | Status: SHIPPED | OUTPATIENT
Start: 2020-07-23 | End: 2020-11-13

## 2020-07-24 ENCOUNTER — ANTICOAGULATION VISIT (OUTPATIENT)
Dept: PHARMACY | Facility: HOSPITAL | Age: 85
End: 2020-07-24

## 2020-07-24 DIAGNOSIS — Z95.2 HX OF AORTIC VALVE REPLACEMENT, MECHANICAL: ICD-10-CM

## 2020-07-24 DIAGNOSIS — I63.411 CEREBROVASCULAR ACCIDENT (CVA) DUE TO EMBOLISM OF RIGHT MIDDLE CEREBRAL ARTERY (HCC): ICD-10-CM

## 2020-07-24 LAB — INR PPP: 4.3

## 2020-07-24 NOTE — PROGRESS NOTES
Anticoagulation Clinic Progress Note    Anticoagulation Summary  As of 2020    INR goal:   3.0-3.5   TTR:   62.2 % (1.8 y)   INR used for dosin.30! (2020)   Warfarin maintenance plan:   5 mg every Sun, Tue, Fri; 7.5 mg all other days   Weekly warfarin total:   45 mg   Plan last modified:   Vasquez Niño RPH (2020)   Next INR check:   2020   Priority:   Maintenance   Target end date:   Indefinite    Indications    Hx of aortic valve replacement  mechanical [Z95.2] [Z95.2]  Atrial fibrillation (CMS/HCC) [I48.91]  Cerebrovascular accident (CVA) due to embolism of right middle cerebral artery (CMS/HCC) [I63.411]             Anticoagulation Episode Summary     INR check location:       Preferred lab:       Send INR reminders to:    [facility]INICAL POOL    Comments:   New INR goal 3 - 3.5 (see 2020 hospitalization for CVA)      Anticoagulation Care Providers     Provider Role Specialty Phone number    Griffin Fried MD Referring Cardiology [phone]          Clinic Interview:  Patient Findings     Negatives:   Signs/symptoms of thrombosis, Signs/symptoms of bleeding,   Laboratory test error suspected, Change in health, Change in alcohol use,   Change in activity, Upcoming invasive procedure, Emergency department   visit, Upcoming dental procedure, Missed doses, Extra doses, Change in   medications, Change in diet/appetite, Hospital admission, Bruising, Other   complaints      Clinical Outcomes     Negatives:   Major bleeding event, Thromboembolic event,   Anticoagulation-related hospital admission, Anticoagulation-related ED   visit, Anticoagulation-related fatality        INR History:  Anticoagulation Monitoring 7/10/2020 2020 2020   INR 3.50 4.00 4.30   INR Date 7/10/2020 2020 2020   INR Goal 3.0-3.5 3.0-3.5 3.0-3.5   Trend Same Same Down   Last Week Total 47.5 mg 47.5 mg 45 mg   Next Week Total 47.5 mg 45 mg 40 mg   Sun 7.5 mg 7.5 mg 5 mg   Mon 7.5 mg 7.5  mg 7.5 mg   Tue 5 mg 5 mg 5 mg   Wed 7.5 mg 7.5 mg 7.5 mg   Thu 7.5 mg 7.5 mg 7.5 mg   Fri 5 mg 2.5 mg (7/17) Hold (7/24)   Sat 7.5 mg 7.5 mg 7.5 mg   Visit Report - - -   Some recent data might be hidden       Plan:  1. INR is Supratherapeutic today- see above in Anticoagulation Summary.   Will instruct Francisco Sequeira to Change their warfarin regimen- see above in Anticoagulation Summary.  2. Follow up in 1 week  3. They have been instructed to call if any changes in medications, doses, concerns, etc. Patient expresses understanding and has no further questions at this time.    Vasquez Niño [facility]

## 2020-07-29 ENCOUNTER — OFFICE VISIT (OUTPATIENT)
Dept: CARDIOLOGY | Facility: CLINIC | Age: 85
End: 2020-07-29

## 2020-07-29 VITALS
SYSTOLIC BLOOD PRESSURE: 140 MMHG | HEIGHT: 72 IN | WEIGHT: 182 LBS | HEART RATE: 61 BPM | BODY MASS INDEX: 24.65 KG/M2 | DIASTOLIC BLOOD PRESSURE: 68 MMHG | OXYGEN SATURATION: 99 %

## 2020-07-29 DIAGNOSIS — I10 ESSENTIAL HYPERTENSION: Primary | ICD-10-CM

## 2020-07-29 DIAGNOSIS — Z95.2 HX OF AORTIC VALVE REPLACEMENT, MECHANICAL: ICD-10-CM

## 2020-07-29 DIAGNOSIS — I48.21 PERMANENT ATRIAL FIBRILLATION (HCC): ICD-10-CM

## 2020-07-29 PROCEDURE — 99214 OFFICE O/P EST MOD 30 MIN: CPT | Performed by: INTERNAL MEDICINE

## 2020-07-29 NOTE — PROGRESS NOTES
Subjective:     Encounter Date:07/29/20        Patient ID: Francisco Sequeira is a 84 y.o. male.    Chief Complaint:  Atrial Fibrillation   Presents for follow-up visit. Symptoms are negative for hypertension and tachycardia. Past medical history includes atrial fibrillation.   Hypertension   This is a chronic problem. The current episode started more than 1 year ago. The problem is controlled.       83-year-old gentleman who presents today for reevaluation.  Overall patient's been doing well.  No chest pain shortness of breath palpitations lightheadedness swelling or fatigue.  Patient did ask about a handicap sticker.  Patient walks with a cane he was walking with a walker this occurred after his stroke.  That is reasonable to renew his handicap sticker.    Review of Systems   All other systems reviewed and are negative.      Procedures  Interpretation Summary  1/16/2020    · Left ventricular systolic function is normal. Estimated EF = 60%. Normal left ventricular cavity size noted. All left ventricular wall segments contract normally. Left ventricular wall thickness is consistent with mild-to-moderate concentric hypertrophy. Left ventricular diastolic function not assessed on this study. There is no evidence of a left ventricular mass or thrombus present.  · Left atrial cavity size is severely dilated. There is spontaneous echo contrast present. There is a medium degree of spontaneous echo contrast in the left atrium. The left atrial appendage as been ligated. The doppler velocity in the remaining stump is severely reduced Saline test results are negative.  · Right atrial cavity size is severely dilated. The inferior vena cava is dilated. Partial IVC inspiratory collapse of less than 50% noted.  · There is a prosthetic aortic valve. No significant aortic valve regurgitation is present. There is a bileaflet tilting disc mechanical valve present. The aortic valve peak and mean gradients are within defined limits.  The prosthetic valve is normal.  · Mild-to-moderate mitral valve regurgitation is present. No significant mitral valve stenosis is present. There is mild nodular thickening of the mitral leaflet tips.  · The tricuspid valve is grossly normal. No evidence of tricuspid valve stenosis is present. Moderate tricuspid valve regurgitation is present. Estimated right ventricular systolic pressure from tricuspid regurgitation is mildly elevated (35-45 mmHg). Calculated right ventricular systolic pressure from tricuspid regurgitation is 40 mmHg.               Objective:     Physical Exam   Constitutional: He is oriented to person, place, and time. He appears well-developed.   HENT:   Head: Normocephalic.   Eyes: Conjunctivae are normal.   Neck: Normal range of motion.   Cardiovascular: Normal rate and normal heart sounds. An irregularly irregular rhythm present.   Artificial click   Pulmonary/Chest: Breath sounds normal.   Abdominal: Soft. Bowel sounds are normal.   Musculoskeletal: Normal range of motion. He exhibits no edema.   Neurological: He is alert and oriented to person, place, and time.   Skin: Skin is warm and dry.   Psychiatric: He has a normal mood and affect. His behavior is normal.   Vitals reviewed.      Lab Review:       Assessment:          Diagnosis Plan   1. Essential hypertension     2. Permanent atrial fibrillation (CMS/HCC)     3. Hx of aortic valve replacement, mechanical [Z95.2]            Plan:       1.  Chronic atrial fibrillation.  Heart rate was still controlled he is anticoagulated with Coumadin.  2.  Patient with artificial aortic metal heart valve.  Patient had a stroke in January 2020.  Above is a ABELARDO associated with that hospitalization.  3.  Hypertension blood pressures okay.  Would continue to follow at home.    4. Continue same follow-up one year    Atrial Fibrillation and Atrial Flutter  Assessment  • The patient has permanent atrial fibrillation  • This is valvular in etiology  • The  patient's CHADS2-VASc score is 4  • A [id number] score of 2 or more is considered a high risk for a thromboembolic event  • Warfarin prescribed    Plan  • Continue in atrial fibrillation with rate control  • Continue warfarin for antithrombotic therapy, bleeding issues discussed

## 2020-07-31 ENCOUNTER — ANTICOAGULATION VISIT (OUTPATIENT)
Dept: PHARMACY | Facility: HOSPITAL | Age: 85
End: 2020-07-31

## 2020-07-31 DIAGNOSIS — Z95.2 HX OF AORTIC VALVE REPLACEMENT, MECHANICAL: ICD-10-CM

## 2020-07-31 DIAGNOSIS — I63.411 CEREBROVASCULAR ACCIDENT (CVA) DUE TO EMBOLISM OF RIGHT MIDDLE CEREBRAL ARTERY (HCC): ICD-10-CM

## 2020-07-31 LAB — INR PPP: 3.5

## 2020-07-31 NOTE — PROGRESS NOTES
Anticoagulation Clinic Progress Note    Anticoagulation Summary  As of 7/31/2020    INR goal:   3.0-3.5   TTR:   61.5 % (1.8 y)   INR used for dosing:   3.50 (7/31/2020)   Warfarin maintenance plan:   5 mg every Sun, Tue, Fri; 7.5 mg all other days   Weekly warfarin total:   45 mg   No change documented:   Vasquez Niño RPH   Plan last modified:   Vasquez Niño RPH (7/24/2020)   Next INR check:   8/7/2020   Priority:   Maintenance   Target end date:   Indefinite    Indications    Hx of aortic valve replacement  mechanical [Z95.2] [Z95.2]  Atrial fibrillation (CMS/HCC) [I48.91]  Cerebrovascular accident (CVA) due to embolism of right middle cerebral artery (CMS/HCC) [I63.411]             Anticoagulation Episode Summary     INR check location:       Preferred lab:       Send INR reminders to:   [facility] CLINICAL POOL    Comments:   New INR goal 3 - 3.5 (see 1/2020 hospitalization for CVA)      Anticoagulation Care Providers     Provider Role Specialty Phone number    Griffin Fried MD Referring Cardiology [phone]          Clinic Interview:  Patient Findings     Negatives:   Signs/symptoms of thrombosis, Signs/symptoms of bleeding,   Laboratory test error suspected, Change in health, Change in alcohol use,   Change in activity, Upcoming invasive procedure, Emergency department   visit, Upcoming dental procedure, Missed doses, Extra doses, Change in   medications, Change in diet/appetite, Hospital admission, Bruising, Other   complaints      Clinical Outcomes     Negatives:   Major bleeding event, Thromboembolic event,   Anticoagulation-related hospital admission, Anticoagulation-related ED   visit, Anticoagulation-related fatality        INR History:  Anticoagulation Monitoring 7/17/2020 7/24/2020 7/31/2020   INR 4.00 4.30 3.50   INR Date 7/17/2020 7/24/2020 7/31/2020   INR Goal 3.0-3.5 3.0-3.5 3.0-3.5   Trend Same Down Same   Last Week Total 47.5 mg 45 mg 40 mg   Next Week Total 45 mg 40 mg 45 mg    Sun 7.5 mg 5 mg 5 mg   Mon 7.5 mg 7.5 mg 7.5 mg   Tue 5 mg 5 mg 5 mg   Wed 7.5 mg 7.5 mg 7.5 mg   Thu 7.5 mg 7.5 mg 7.5 mg   Fri 2.5 mg (7/17) Hold (7/24) 5 mg   Sat 7.5 mg 7.5 mg 7.5 mg   Visit Report - - -   Some recent data might be hidden       Plan:  1. INR is Therapeutic today- see above in Anticoagulation Summary.   Will instruct Francisco Sequeira to Continue their warfarin regimen- see above in Anticoagulation Summary.  2. Follow up in 1 week  3. They have been instructed to call if any changes in medications, doses, concerns, etc. Patient expresses understanding and has no further questions at this time.    Vasquez Niño [facility]

## 2020-08-03 RX ORDER — WARFARIN SODIUM 5 MG/1
TABLET ORAL
Qty: 120 TABLET | Refills: 0 | Status: SHIPPED | OUTPATIENT
Start: 2020-08-03 | End: 2020-11-03

## 2020-08-07 ENCOUNTER — ANTICOAGULATION VISIT (OUTPATIENT)
Dept: PHARMACY | Facility: HOSPITAL | Age: 85
End: 2020-08-07

## 2020-08-07 DIAGNOSIS — I63.411 CEREBROVASCULAR ACCIDENT (CVA) DUE TO EMBOLISM OF RIGHT MIDDLE CEREBRAL ARTERY (HCC): ICD-10-CM

## 2020-08-07 DIAGNOSIS — Z95.2 HX OF AORTIC VALVE REPLACEMENT, MECHANICAL: ICD-10-CM

## 2020-08-07 LAB — INR PPP: 4.2

## 2020-08-07 NOTE — PROGRESS NOTES
Anticoagulation Clinic Progress Note    Anticoagulation Summary  As of 2020    INR goal:   3.0-3.5   TTR:   60.9 % (1.8 y)   INR used for dosin.20! (2020)   Warfarin maintenance plan:   7.5 mg every Mon, Thu, Sat; 5 mg all other days   Weekly warfarin total:   42.5 mg   Plan last modified:   Vasquez Niño RPH (2020)   Next INR check:   2020   Priority:   Maintenance   Target end date:   Indefinite    Indications    Hx of aortic valve replacement  mechanical [Z95.2] [Z95.2]  Atrial fibrillation (CMS/HCC) [I48.91]  Cerebrovascular accident (CVA) due to embolism of right middle cerebral artery (CMS/HCC) [I63.411]             Anticoagulation Episode Summary     INR check location:       Preferred lab:       Send INR reminders to:    [facility]    Comments:   New INR goal 3 - 3.5 (see 2020 hospitalization for CVA)      Anticoagulation Care Providers     Provider Role Specialty Phone number    Griffin Fried MD Referring Cardiology [phone]          Clinic Interview:  Patient Findings     Negatives:   Signs/symptoms of thrombosis, Signs/symptoms of bleeding,   Laboratory test error suspected, Change in health, Change in alcohol use,   Change in activity, Upcoming invasive procedure, Emergency department   visit, Upcoming dental procedure, Missed doses, Extra doses, Change in   medications, Change in diet/appetite, Hospital admission, Bruising, Other   complaints      Clinical Outcomes     Negatives:   Major bleeding event, Thromboembolic event,   Anticoagulation-related hospital admission, Anticoagulation-related ED   visit, Anticoagulation-related fatality        INR History:  Anticoagulation Monitoring 2020   INR 4.30 3.50 4.20   INR Date 2020   INR Goal 3.0-3.5 3.0-3.5 3.0-3.5   Trend Down Same Down   Last Week Total 45 mg 40 mg 45 mg   Next Week Total 40 mg 45 mg 37.5 mg   Sun 5 mg 5 mg 5 mg   Mon 7.5 mg 7.5 mg 7.5 mg    Tue 5 mg 5 mg 5 mg   Wed 7.5 mg 7.5 mg 5 mg   Thu 7.5 mg 7.5 mg 7.5 mg   Fri Hold (7/24) 5 mg Hold (8/7)   Sat 7.5 mg 7.5 mg 7.5 mg   Visit Report - - -   Some recent data might be hidden       Plan:  1. INR is Supratherapeutic today- see above in Anticoagulation Summary.   Will instruct Francisco Sequeira to Change their warfarin regimen- see above in Anticoagulation Summary.  2. Follow up in 1 week  3. They have been instructed to call if any changes in medications, doses, concerns, etc. Patient expresses understanding and has no further questions at this time.    Vasquez Niño [facility]

## 2020-08-13 DIAGNOSIS — IMO0002 UNCONTROLLED TYPE 2 DIABETES MELLITUS WITH COMPLICATION, WITH LONG-TERM CURRENT USE OF INSULIN: ICD-10-CM

## 2020-08-14 ENCOUNTER — ANTICOAGULATION VISIT (OUTPATIENT)
Dept: PHARMACY | Facility: HOSPITAL | Age: 85
End: 2020-08-14

## 2020-08-14 DIAGNOSIS — I63.411 CEREBROVASCULAR ACCIDENT (CVA) DUE TO EMBOLISM OF RIGHT MIDDLE CEREBRAL ARTERY (HCC): ICD-10-CM

## 2020-08-14 DIAGNOSIS — Z95.2 HX OF AORTIC VALVE REPLACEMENT, MECHANICAL: ICD-10-CM

## 2020-08-14 LAB — INR PPP: 2.9

## 2020-08-14 NOTE — PROGRESS NOTES
Anticoagulation Clinic Progress Note    Anticoagulation Summary  As of 2020    INR goal:   3.0-3.5   TTR:   60.7 % (1.8 y)   INR used for dosin.90! (2020)   Warfarin maintenance plan:   7.5 mg every Mon, Thu, Sat; 5 mg all other days   Weekly warfarin total:   42.5 mg   No change documented:   Vasquez Niño RPH   Plan last modified:   Vasquez Niño RPH (2020)   Next INR check:   2020   Priority:   Maintenance   Target end date:   Indefinite    Indications    Hx of aortic valve replacement  mechanical [Z95.2] [Z95.2]  Atrial fibrillation (CMS/HCC) [I48.91]  Cerebrovascular accident (CVA) due to embolism of right middle cerebral artery (CMS/HCC) [I63.411]             Anticoagulation Episode Summary     INR check location:       Preferred lab:       Send INR reminders to:   [facility]    Comments:   New INR goal 3 - 3.5 (see 2020 hospitalization for CVA)      Anticoagulation Care Providers     Provider Role Specialty Phone number    Griffin Fried MD Referring Cardiology [phone]          Clinic Interview:  Patient Findings     Negatives:   Signs/symptoms of thrombosis, Signs/symptoms of bleeding,   Laboratory test error suspected, Change in health, Change in alcohol use,   Change in activity, Upcoming invasive procedure, Emergency department   visit, Upcoming dental procedure, Missed doses, Extra doses, Change in   medications, Change in diet/appetite, Hospital admission, Bruising, Other   complaints      Clinical Outcomes     Negatives:   Major bleeding event, Thromboembolic event,   Anticoagulation-related hospital admission, Anticoagulation-related ED   visit, Anticoagulation-related fatality        INR History:  Anticoagulation Monitoring 2020   INR 3.50 4.20 2.90   INR Date 2020   INR Goal 3.0-3.5 3.0-3.5 3.0-3.5   Trend Same Down Same   Last Week Total 40 mg 45 mg 37.5 mg   Next Week Total 45 mg 37.5 mg 42.5 mg    Sun 5 mg 5 mg 5 mg   Mon 7.5 mg 7.5 mg 7.5 mg   Tue 5 mg 5 mg 5 mg   Wed 7.5 mg 5 mg 5 mg   Thu 7.5 mg 7.5 mg 7.5 mg   Fri 5 mg Hold (8/7) 5 mg   Sat 7.5 mg 7.5 mg 7.5 mg   Visit Report - - -   Some recent data might be hidden       Plan:  1. INR is Subtherapeutic today- see above in Anticoagulation Summary.   Will instruct Francisco Sequeira to Continue their warfarin regimen- see above in Anticoagulation Summary.  2. Follow up in 1 week  3. They have been instructed to call if any changes in medications, doses, concerns, etc. Patient expresses understanding and has no further questions at this time.    Vasquez Niño [facility]

## 2020-08-18 RX ORDER — FUROSEMIDE 20 MG/1
20 TABLET ORAL EVERY OTHER DAY
Qty: 45 TABLET | Refills: 1 | Status: SHIPPED | OUTPATIENT
Start: 2020-08-18 | End: 2021-02-15

## 2020-08-21 ENCOUNTER — ANTICOAGULATION VISIT (OUTPATIENT)
Dept: PHARMACY | Facility: HOSPITAL | Age: 85
End: 2020-08-21

## 2020-08-21 DIAGNOSIS — Z95.2 HX OF AORTIC VALVE REPLACEMENT, MECHANICAL: ICD-10-CM

## 2020-08-21 DIAGNOSIS — I63.411 CEREBROVASCULAR ACCIDENT (CVA) DUE TO EMBOLISM OF RIGHT MIDDLE CEREBRAL ARTERY (HCC): ICD-10-CM

## 2020-08-21 LAB — INR PPP: 2.7

## 2020-08-21 NOTE — PROGRESS NOTES
Anticoagulation Clinic Progress Note    Anticoagulation Summary  As of 2020    INR goal:   3.0-3.5   TTR:   60.0 % (1.9 y)   INR used for dosin.70! (2020)   Warfarin maintenance plan:   5 mg every Sun, Tue, Fri; 7.5 mg all other days   Weekly warfarin total:   45 mg   Plan last modified:   Audra Vazquez RPH (2020)   Next INR check:   2020   Priority:   Maintenance   Target end date:   Indefinite    Indications    Hx of aortic valve replacement  mechanical [Z95.2] [Z95.2]  Atrial fibrillation (CMS/HCC) [I48.91]  Cerebrovascular accident (CVA) due to embolism of right middle cerebral artery (CMS/HCC) [I63.411]             Anticoagulation Episode Summary     INR check location:       Preferred lab:       Send INR reminders to:    [facility]    Comments:   New INR goal 3 - 3.5 (see 2020 hospitalization for CVA)      Anticoagulation Care Providers     Provider Role Specialty Phone number    Griffin Fried MD Referring Cardiology [phone]          Clinic Interview:      INR History:  Anticoagulation Monitoring 2020   INR 4.20 2.90 2.70   INR Date 2020   INR Goal 3.0-3.5 3.0-3.5 3.0-3.5   Trend Down Same Up   Last Week Total 45 mg 37.5 mg 42.5 mg   Next Week Total 37.5 mg 42.5 mg 47.5 mg   Sun 5 mg 5 mg 5 mg   Mon 7.5 mg 7.5 mg 7.5 mg   Tue 5 mg 5 mg 5 mg   Wed 5 mg 5 mg 7.5 mg   Thu 7.5 mg 7.5 mg 7.5 mg   Fri Hold () 5 mg 7.5 mg ()   Sat 7.5 mg 7.5 mg 7.5 mg   Visit Report - - -   Some recent data might be hidden       Plan:  1. INR is Subtherapeutic today- see above in Anticoagulation Summary.   Will instruct Francisco Sequeira to Change their warfarin regimen- see above in Anticoagulation Summary.  2. Follow up in 1 week  3. Spoke with spouse Gladys.  They have been instructed to call if any changes in medications, doses, concerns, etc.  Audra Vazquez, [facility]

## 2020-08-28 ENCOUNTER — ANTICOAGULATION VISIT (OUTPATIENT)
Dept: PHARMACY | Facility: HOSPITAL | Age: 85
End: 2020-08-28

## 2020-08-28 ENCOUNTER — OFFICE VISIT (OUTPATIENT)
Dept: FAMILY MEDICINE CLINIC | Facility: CLINIC | Age: 85
End: 2020-08-28

## 2020-08-28 VITALS
DIASTOLIC BLOOD PRESSURE: 70 MMHG | HEART RATE: 68 BPM | SYSTOLIC BLOOD PRESSURE: 150 MMHG | BODY MASS INDEX: 25 KG/M2 | HEIGHT: 72 IN | WEIGHT: 184.6 LBS | TEMPERATURE: 98.2 F | OXYGEN SATURATION: 98 %

## 2020-08-28 DIAGNOSIS — Z95.2 HX OF AORTIC VALVE REPLACEMENT, MECHANICAL: ICD-10-CM

## 2020-08-28 DIAGNOSIS — K21.9 GASTROESOPHAGEAL REFLUX DISEASE WITHOUT ESOPHAGITIS: ICD-10-CM

## 2020-08-28 DIAGNOSIS — I10 ESSENTIAL HYPERTENSION: ICD-10-CM

## 2020-08-28 DIAGNOSIS — IMO0002 UNCONTROLLED TYPE 2 DIABETES MELLITUS WITH COMPLICATION, WITH LONG-TERM CURRENT USE OF INSULIN: Primary | ICD-10-CM

## 2020-08-28 DIAGNOSIS — E78.5 HYPERLIPIDEMIA, UNSPECIFIED HYPERLIPIDEMIA TYPE: ICD-10-CM

## 2020-08-28 DIAGNOSIS — I63.411 CEREBROVASCULAR ACCIDENT (CVA) DUE TO EMBOLISM OF RIGHT MIDDLE CEREBRAL ARTERY (HCC): ICD-10-CM

## 2020-08-28 LAB
HBA1C MFR BLD: 6.5 %
INR PPP: 4.4

## 2020-08-28 PROCEDURE — 99214 OFFICE O/P EST MOD 30 MIN: CPT | Performed by: NURSE PRACTITIONER

## 2020-08-28 PROCEDURE — 83036 HEMOGLOBIN GLYCOSYLATED A1C: CPT | Performed by: NURSE PRACTITIONER

## 2020-08-28 RX ORDER — CLOBETASOL PROPIONATE 0.46 MG/ML
SOLUTION TOPICAL 2 TIMES DAILY
Qty: 1 EACH | Refills: 2 | Status: SHIPPED | OUTPATIENT
Start: 2020-08-28 | End: 2020-12-17

## 2020-08-28 NOTE — PROGRESS NOTES
Anticoagulation Clinic Progress Note    Anticoagulation Summary  As of 2020    INR goal:   3.0-3.5   TTR:   59.7 % (1.9 y)   INR used for dosin.40! (2020)   Warfarin maintenance plan:   5 mg every Sun, Tue, Thu; 7.5 mg all other days   Weekly warfarin total:   45 mg   Plan last modified:   Vasquez Niño RPH (2020)   Next INR check:   2020   Priority:   Maintenance   Target end date:   Indefinite    Indications    Hx of aortic valve replacement  mechanical [Z95.2] [Z95.2]  Atrial fibrillation (CMS/HCC) [I48.91]  Cerebrovascular accident (CVA) due to embolism of right middle cerebral artery (CMS/HCC) [I63.411]             Anticoagulation Episode Summary     INR check location:       Preferred lab:       Send INR reminders to:    [facility]    Comments:   New INR goal 3 - 3.5 (see 2020 hospitalization for CVA)      Anticoagulation Care Providers     Provider Role Specialty Phone number    Griffin Fried MD Referring Cardiology [phone]          Clinic Interview:  Patient Findings     Negatives:   Signs/symptoms of thrombosis, Signs/symptoms of bleeding,   Laboratory test error suspected, Change in health, Change in alcohol use,   Change in activity, Upcoming invasive procedure, Emergency department   visit, Upcoming dental procedure, Missed doses, Extra doses, Change in   medications, Change in diet/appetite, Hospital admission, Bruising, Other   complaints      Clinical Outcomes     Negatives:   Major bleeding event, Thromboembolic event,   Anticoagulation-related hospital admission, Anticoagulation-related ED   visit, Anticoagulation-related fatality        INR History:  Anticoagulation Monitoring 2020   INR 2.90 2.70 4.40   INR Date 2020   INR Goal 3.0-3.5 3.0-3.5 3.0-3.5   Trend Same Up Same   Last Week Total 37.5 mg 42.5 mg 47.5 mg   Next Week Total 42.5 mg 47.5 mg 40 mg   Sun 5 mg 5 mg 5 mg   Mon 7.5 mg 7.5 mg  7.5 mg   Tue 5 mg 5 mg 5 mg   Wed 5 mg 7.5 mg 7.5 mg   Thu 7.5 mg 7.5 mg 5 mg   Fri 5 mg 7.5 mg (8/21) 2.5 mg (8/28)   Sat 7.5 mg 7.5 mg 7.5 mg   Visit Report - - -   Some recent data might be hidden       Plan:  1. INR is Supratherapeutic today- see above in Anticoagulation Summary.   Will instruct Francisco Sequeira to Change their warfarin regimen- see above in Anticoagulation Summary.  2. Follow up in 1 week  3. They have been instructed to call if any changes in medications, doses, concerns, etc. Patient expresses understanding and has no further questions at this time.    Vasquez Niño RP

## 2020-08-28 NOTE — PROGRESS NOTES
"Subjective   Francisco Sequeira is a 84 y.o. male.     History of Present Illness    Since the last visit, he has overall felt well.  He has Essential Hypertension and well controlled on current medication, DMII well controlled on medication and will continue regimen, GERD controlled on PPI Rx and Hyperlipidemia with goals met with current Rx.  he has been compliant with current medications have reviewed them.  The patient denies medication side effects.  Will refill medications. /70   Pulse 68   Temp 98.2 °F (36.8 °C)   Ht 182.9 cm (72.01\")   Wt 83.7 kg (184 lb 9.6 oz)   SpO2 98%   BMI 25.03 kg/m²     Results for orders placed or performed in visit on 08/28/20   POC Glycosylated Hemoglobin (Hb A1C)   Result Value Ref Range    Hemoglobin A1C 6.5 %         The following portions of the patient's history were reviewed and updated as appropriate: allergies, current medications, past social history and problem list.    Review of Systems   Constitutional: Negative for fever.   Respiratory: Negative for cough and shortness of breath.    Cardiovascular: Negative for chest pain.   Gastrointestinal: Negative for abdominal pain.   Neurological: Negative for dizziness.       Objective   /70   Pulse 68   Temp 98.2 °F (36.8 °C)   Ht 182.9 cm (72.01\")   Wt 83.7 kg (184 lb 9.6 oz)   SpO2 98%   BMI 25.03 kg/m²   Physical Exam   Constitutional: He is oriented to person, place, and time. Vital signs are normal. He appears well-developed and well-nourished. No distress.   HENT:   Head: Normocephalic.   Cardiovascular: Normal rate, regular rhythm and normal heart sounds.   Pulmonary/Chest: Effort normal and breath sounds normal.   Neurological: He is alert and oriented to person, place, and time. Gait normal.   Psychiatric: He has a normal mood and affect. His behavior is normal. Judgment and thought content normal.   Vitals reviewed.      Assessment/Plan      Diagnosis Plan   1. Uncontrolled type 2 diabetes " mellitus with complication, with long-term current use of insulin (CMS/[facility])  POC Glycosylated Hemoglobin (Hb A1C)    MicroAlbumin, Urine, Random - Urine, Clean Catch   2. Gastroesophageal reflux disease without esophagitis     3. Hyperlipidemia, unspecified hyperlipidemia type     4. Essential hypertension       Cont same  rto in 6 [facility]     Rubinque Hinkle, APRN  8/28/2020

## 2020-08-29 LAB — MICROALBUMIN UR-MCNC: 161.6 UG/ML

## 2020-09-04 ENCOUNTER — ANTICOAGULATION VISIT (OUTPATIENT)
Dept: PHARMACY | Facility: HOSPITAL | Age: 85
End: 2020-09-04

## 2020-09-04 DIAGNOSIS — I63.411 CEREBROVASCULAR ACCIDENT (CVA) DUE TO EMBOLISM OF RIGHT MIDDLE CEREBRAL ARTERY (HCC): ICD-10-CM

## 2020-09-04 DIAGNOSIS — Z95.2 HX OF AORTIC VALVE REPLACEMENT, MECHANICAL: ICD-10-CM

## 2020-09-04 LAB — INR PPP: 2.8

## 2020-09-04 NOTE — PROGRESS NOTES
Anticoagulation Clinic Progress Note    Anticoagulation Summary  As of 2020    INR goal:   3.0-3.5   TTR:   59.4 % (1.9 y)   INR used for dosin.80! (2020)   Warfarin maintenance plan:   5 mg every Sun, Tue, u; 7.5 mg all other days   Weekly warfarin total:   45 mg   No change documented:   Michelle Moise RPH   Plan last modified:   Vasquez Niño RPH (2020)   Next INR check:   2020   Priority:   Maintenance   Target end date:   Indefinite    Indications    Hx of aortic valve replacement  mechanical [Z95.2] [Z95.2]  Atrial fibrillation (CMS/HCC) [I48.91]  Cerebrovascular accident (CVA) due to embolism of right middle cerebral artery (CMS/HCC) [I63.411]             Anticoagulation Episode Summary     INR check location:       Preferred lab:       Send INR reminders to:   [facility]    Comments:   New INR goal 3 - 3.5 (see 2020 hospitalization for CVA)      Anticoagulation Care Providers     Provider Role Specialty Phone number    Griffin Fried MD Referring Cardiology [phone]          Clinic Interview:  Patient Findings     Negatives:   Signs/symptoms of thrombosis, Signs/symptoms of bleeding,   Laboratory test error suspected, Change in health, Change in alcohol use,   Change in activity, Upcoming invasive procedure, Emergency department   visit, Upcoming dental procedure, Missed doses, Extra doses, Change in   medications, Change in diet/appetite, Hospital admission, Bruising, Other   complaints      Clinical Outcomes     Negatives:   Major bleeding event, Thromboembolic event,   Anticoagulation-related hospital admission, Anticoagulation-related ED   visit, Anticoagulation-related fatality        INR History:  Anticoagulation Monitoring 2020   INR 2.70 4.40 2.80   INR Date 2020   INR Goal 3.0-3.5 3.0-3.5 3.0-3.5   Trend Up Same Same   Last Week Total 42.5 mg 47.5 mg 40 mg   Next Week Total 47.5 mg 40 mg 45 mg    Sun 5 mg 5 mg 5 mg   Mon 7.5 mg 7.5 mg 7.5 mg   Tue 5 mg 5 mg 5 mg   Wed 7.5 mg 7.5 mg 7.5 mg   Thu 7.5 mg 5 mg 5 mg   Fri 7.5 mg (8/21) 2.5 mg (8/28) 7.5 mg   Sat 7.5 mg 7.5 mg 7.5 mg   Visit Report - - -   Some recent data might be hidden       Plan:  1. INR is Subtherapeutic today- see above in Anticoagulation Summary.   Will instruct Francisco Sequeira to Continue their warfarin regimen- see above in Anticoagulation Summary.  2. Follow up in 1 week  3. They have been instructed to call if any changes in medications, doses, concerns, etc. Patient expresses understanding and has no further questions at this time.    Michelle Moise [facility]

## 2020-09-11 ENCOUNTER — ANTICOAGULATION VISIT (OUTPATIENT)
Dept: PHARMACY | Facility: HOSPITAL | Age: 85
End: 2020-09-11

## 2020-09-11 DIAGNOSIS — Z95.2 HX OF AORTIC VALVE REPLACEMENT, MECHANICAL: ICD-10-CM

## 2020-09-11 DIAGNOSIS — I63.411 CEREBROVASCULAR ACCIDENT (CVA) DUE TO EMBOLISM OF RIGHT MIDDLE CEREBRAL ARTERY (HCC): ICD-10-CM

## 2020-09-11 LAB — INR PPP: 3.3

## 2020-09-11 NOTE — PROGRESS NOTES
Anticoagulation Clinic Progress Note    Anticoagulation Summary  As of 9/11/2020    INR goal:   3.0-3.5   TTR:   59.4 % (1.9 y)   INR used for dosing:   3.30 (9/11/2020)   Warfarin maintenance plan:   5 mg every Sun, Tue, Thu; 7.5 mg all other days   Weekly warfarin total:   45 mg   No change documented:   Will Hansen, PharmD   Plan last modified:   Vasquez Niño RPH (8/28/2020)   Next INR check:   9/18/2020   Priority:   Maintenance   Target end date:   Indefinite    Indications    Hx of aortic valve replacement  mechanical [Z95.2] [Z95.2]  Atrial fibrillation (CMS/HCC) [I48.91]  Cerebrovascular accident (CVA) due to embolism of right middle cerebral artery (CMS/HCC) [I63.411]             Anticoagulation Episode Summary     INR check location:       Preferred lab:       Send INR reminders to:   [facility]    Comments:   New INR goal 3 - 3.5 (see 1/2020 hospitalization for CVA)      Anticoagulation Care Providers     Provider Role Specialty Phone number    Griffin Fried MD Referring Cardiology [phone]          Clinic Interview:  Patient Findings     Negatives:   Signs/symptoms of thrombosis, Signs/symptoms of bleeding,   Laboratory test error suspected, Change in health, Change in alcohol use,   Change in activity, Upcoming invasive procedure, Emergency department   visit, Upcoming dental procedure, Missed doses, Extra doses, Change in   medications, Change in diet/appetite, Hospital admission, Bruising, Other   complaints      Clinical Outcomes     Negatives:   Major bleeding event, Thromboembolic event,   Anticoagulation-related hospital admission, Anticoagulation-related ED   visit, Anticoagulation-related fatality        INR History:  Anticoagulation Monitoring 8/28/2020 9/4/2020 9/11/2020   INR 4.40 2.80 3.30   INR Date 8/28/2020 9/4/2020 9/11/2020   INR Goal 3.0-3.5 3.0-3.5 3.0-3.5   Trend Same Same Same   Last Week Total 47.5 mg 40 mg 45 mg   Next Week Total 40 mg 45 mg 45 mg    Sun 5 mg 5 mg 5 mg   Mon 7.5 mg 7.5 mg 7.5 mg   Tue 5 mg 5 mg 5 mg   Wed 7.5 mg 7.5 mg 7.5 mg   Thu 5 mg 5 mg 5 mg   Fri 2.5 mg (8/28) 7.5 mg 7.5 mg   Sat 7.5 mg 7.5 mg 7.5 mg   Visit Report - - -   Some recent data might be hidden       Plan:  1. INR is Therapeutic today- see above in Anticoagulation Summary.   Will instruct Francisco Sequeira to Continue their warfarin regimen- see above in Anticoagulation Summary.  2. Follow up in 1 week  3. Pt has agreed to only be called if INR out of range. They have been instructed to call if any changes in medications, doses, concerns, etc. Patient expresses understanding and has no further questions at this time.    Will Hansen, PharmD

## 2020-09-18 ENCOUNTER — ANTICOAGULATION VISIT (OUTPATIENT)
Dept: PHARMACY | Facility: HOSPITAL | Age: 85
End: 2020-09-18

## 2020-09-18 DIAGNOSIS — Z95.2 HX OF AORTIC VALVE REPLACEMENT, MECHANICAL: ICD-10-CM

## 2020-09-18 DIAGNOSIS — I63.411 CEREBROVASCULAR ACCIDENT (CVA) DUE TO EMBOLISM OF RIGHT MIDDLE CEREBRAL ARTERY (HCC): ICD-10-CM

## 2020-09-18 LAB — INR PPP: 2.8

## 2020-09-18 NOTE — PROGRESS NOTES
Anticoagulation Clinic Progress Note    Anticoagulation Summary  As of 2020    INR goal:  3.0-3.5   TTR:  59.5 % (1.9 y)   INR used for dosin.80 (2020)   Warfarin maintenance plan:  5 mg every Sun, Tue, Thu; 7.5 mg all other days   Weekly warfarin total:  45 mg   Plan last modified:  Vasquez Niño RPH (2020)   Next INR check:  2020   Priority:  Maintenance   Target end date:  Indefinite    Indications    Hx of aortic valve replacement  mechanical [Z95.2] [Z95.2]  Atrial fibrillation (CMS/HCC) [I48.91]  Cerebrovascular accident (CVA) due to embolism of right middle cerebral artery (CMS/HCC) [I63.411]             Anticoagulation Episode Summary     INR check location:      Preferred lab:      Send INR reminders to:   [facility]    Comments:  New INR goal 3 - 3.5 (see 2020 hospitalization for CVA)      Anticoagulation Care Providers     Provider Role Specialty Phone number    Griffin Fried MD Referring Cardiology [phone]          Clinic Interview:  No pertinent clinical findings have been reported.    INR History:  Anticoagulation Monitoring 2020   INR 2.80 3.30 2.80   INR Date 2020   INR Goal 3.0-3.5 3.0-3.5 3.0-3.5   Trend Same Same Same   Last Week Total 40 mg 45 mg 45 mg   Next Week Total 45 mg 45 mg 45 mg   Sun 5 mg 5 mg 5 mg   Mon 7.5 mg 7.5 mg 7.5 mg   Tue 5 mg 5 mg 5 mg   Wed 7.5 mg 7.5 mg 7.5 mg   Thu 5 mg 5 mg 5 mg   Fri 7.5 mg 7.5 mg 7.5 mg   Sat 7.5 mg 7.5 mg 7.5 mg   Visit Report - - -   Some recent data might be hidden       Plan:  1. INR is subtherapeutic today- see above in Anticoagulation Summary. Spoke with Francisco Sequeira's wife [place], instructed to continue his current warfarin dosage regimen- see above in Anticoagulation Summary.  2. Follow up in 1 week  3. Pt has agreed to only be called if INR out of range. They have been instructed to call if any changes in medications, doses, concerns, etc.  Family expresses understanding and has no further questions at this time.    Thuy Vazquez, [facility]

## 2020-09-22 RX ORDER — DIGOXIN 125 MCG
TABLET ORAL
Qty: 90 TABLET | Refills: 0 | Status: SHIPPED | OUTPATIENT
Start: 2020-09-22 | End: 2020-12-28

## 2020-09-25 ENCOUNTER — ANTICOAGULATION VISIT (OUTPATIENT)
Dept: PHARMACY | Facility: HOSPITAL | Age: 85
End: 2020-09-25

## 2020-09-25 DIAGNOSIS — Z95.2 HX OF AORTIC VALVE REPLACEMENT, MECHANICAL: ICD-10-CM

## 2020-09-25 DIAGNOSIS — I63.411 CEREBROVASCULAR ACCIDENT (CVA) DUE TO EMBOLISM OF RIGHT MIDDLE CEREBRAL ARTERY (HCC): ICD-10-CM

## 2020-09-25 LAB — INR PPP: 4

## 2020-09-25 NOTE — PROGRESS NOTES
Anticoagulation Clinic Progress Note    Anticoagulation Summary  As of 2020    INR goal:  3.0-3.5   TTR:  59.3 % (2 y)   INR used for dosin.00 (2020)   Warfarin maintenance plan:  5 mg every Sun, Tue, Thu; 7.5 mg all other days   Weekly warfarin total:  45 mg   Plan last modified:  Vasquez Niño RPH (2020)   Next INR check:  10/2/2020   Priority:  Maintenance   Target end date:  Indefinite    Indications    Hx of aortic valve replacement  mechanical [Z95.2] [Z95.2]  Atrial fibrillation (CMS/HCC) [I48.91]  Cerebrovascular accident (CVA) due to embolism of right middle cerebral artery (CMS/HCC) [I63.411]             Anticoagulation Episode Summary     INR check location:      Preferred lab:      Send INR reminders to:   [facility]    Comments:  New INR goal 3 - 3.5 (see 2020 hospitalization for CVA)      Anticoagulation Care Providers     Provider Role Specialty Phone number    Griffin Fried MD Referring Cardiology [phone]          Clinic Interview:  Patient Findings     Positives:  Change in alcohol use    Negatives:  Signs/symptoms of thrombosis, Signs/symptoms of bleeding,   Laboratory test error suspected, Change in health, Change in activity,   Upcoming invasive procedure, Emergency department visit, Upcoming dental   procedure, Missed doses, Extra doses, Change in medications, Change in   diet/appetite, Hospital admission, Bruising, Other complaints    Comments:  Small taste of wine last evening.       Clinical Outcomes     Negatives:  Major bleeding event, Thromboembolic event,   Anticoagulation-related hospital admission, Anticoagulation-related ED   visit, Anticoagulation-related fatality    Comments:  Small taste of wine last evening.         INR History:  Anticoagulation Monitoring 2020   INR 3.30 2.80 4.00   INR Date 2020   INR Goal 3.0-3.5 3.0-3.5 3.0-3.5   Trend Same Same Same   Last Week Total 45 mg 45  mg 45 mg   Next Week Total 45 mg 45 mg 42.5 mg   Sun 5 mg 5 mg 5 mg   Mon 7.5 mg 7.5 mg 7.5 mg   Tue 5 mg 5 mg 5 mg   Wed 7.5 mg 7.5 mg 7.5 mg   Thu 5 mg 5 mg 5 mg   Fri 7.5 mg 7.5 mg 5 mg (9/25)   Sat 7.5 mg 7.5 mg 7.5 mg   Visit Report - - -   Some recent data might be hidden       Plan:  1. INR is Supratherapeutic today- see above in Anticoagulation Summary.   Will instruct Francisco Sequeira to Change their warfarin regimen- see above in Anticoagulation Summary.Partial dose with 5 mg today then resume current maintenance regimen.   2. Follow up in 1 week  3. Pt has agreed to only be called if INR out of range. They have been instructed to call if any changes in medications, doses, concerns, etc. Patient expresses understanding and has no further questions at this time.    Will Hansen, PharmD

## 2020-10-02 ENCOUNTER — ANTICOAGULATION VISIT (OUTPATIENT)
Dept: PHARMACY | Facility: HOSPITAL | Age: 85
End: 2020-10-02

## 2020-10-02 DIAGNOSIS — Z95.2 HX OF AORTIC VALVE REPLACEMENT, MECHANICAL: ICD-10-CM

## 2020-10-02 DIAGNOSIS — I63.411 CEREBROVASCULAR ACCIDENT (CVA) DUE TO EMBOLISM OF RIGHT MIDDLE CEREBRAL ARTERY (HCC): ICD-10-CM

## 2020-10-02 LAB — INR PPP: 3.4

## 2020-10-02 NOTE — PROGRESS NOTES
Anticoagulation Clinic Progress Note    Anticoagulation Summary  As of 10/2/2020    INR goal:  3.0-3.5   TTR:  58.9 % (2 y)   INR used for dosing:  3.40 (10/2/2020)   Warfarin maintenance plan:  5 mg every Sun, Tue, Thu; 7.5 mg all other days   Weekly warfarin total:  45 mg   No change documented:  Vasquez Niño RPH   Plan last modified:  Vasquez Niño RPH (8/28/2020)   Next INR check:  10/9/2020   Priority:  Maintenance   Target end date:  Indefinite    Indications    Hx of aortic valve replacement  mechanical [Z95.2] [Z95.2]  Atrial fibrillation (CMS/HCC) [I48.91]  Cerebrovascular accident (CVA) due to embolism of right middle cerebral artery (CMS/HCC) [I63.411]             Anticoagulation Episode Summary     INR check location:      Preferred lab:      Send INR reminders to:  [facility]    Comments:  New INR goal 3 - 3.5 (see 1/2020 hospitalization for CVA)      Anticoagulation Care Providers     Provider Role Specialty Phone number    Griffin Fried MD Referring Cardiology [phone]          Clinic Interview:  Patient Findings     Negatives:  Signs/symptoms of thrombosis, Signs/symptoms of bleeding,   Laboratory test error suspected, Change in health, Change in alcohol use,   Change in activity, Upcoming invasive procedure, Emergency department   visit, Upcoming dental procedure, Missed doses, Extra doses, Change in   medications, Change in diet/appetite, Hospital admission, Bruising, Other   complaints      Clinical Outcomes     Negatives:  Major bleeding event, Thromboembolic event,   Anticoagulation-related hospital admission, Anticoagulation-related ED   visit, Anticoagulation-related fatality        INR History:  Anticoagulation Monitoring 9/18/2020 9/25/2020 10/2/2020   INR 2.80 4.00 3.40   INR Date 9/18/2020 9/25/2020 10/2/2020   INR Goal 3.0-3.5 3.0-3.5 3.0-3.5   Trend Same Same Same   Last Week Total 45 mg 45 mg 42.5 mg   Next Week Total 45 mg 42.5 mg 45 mg   Sun 5 mg 5 mg 5  mg   Mon 7.5 mg 7.5 mg 7.5 mg   Tue 5 mg 5 mg 5 mg   Wed 7.5 mg 7.5 mg 7.5 mg   Thu 5 mg 5 mg 5 mg   Fri 7.5 mg 5 mg (9/25) 7.5 mg   Sat 7.5 mg 7.5 mg 7.5 mg   Visit Report - - -   Some recent data might be hidden       Plan:  1. INR is Therapeutic today- see above in Anticoagulation Summary.   Will instruct Francisco Sequeira to Continue their warfarin regimen- see above in Anticoagulation Summary.  2. Follow up in 1 week  3. They have been instructed to call if any changes in medications, doses, concerns, etc. Patient expresses understanding and has no further questions at this time.    Vasquez Niño [facility]

## 2020-10-05 RX ORDER — INSULIN DEGLUDEC INJECTION 100 U/ML
INJECTION, SOLUTION SUBCUTANEOUS
Qty: 15 PEN | Refills: 3 | Status: SHIPPED | OUTPATIENT
Start: 2020-10-05 | End: 2021-01-20

## 2020-10-09 ENCOUNTER — ANTICOAGULATION VISIT (OUTPATIENT)
Dept: PHARMACY | Facility: HOSPITAL | Age: 85
End: 2020-10-09

## 2020-10-09 DIAGNOSIS — I63.411 CEREBROVASCULAR ACCIDENT (CVA) DUE TO EMBOLISM OF RIGHT MIDDLE CEREBRAL ARTERY (HCC): ICD-10-CM

## 2020-10-09 DIAGNOSIS — Z95.2 HX OF AORTIC VALVE REPLACEMENT, MECHANICAL: ICD-10-CM

## 2020-10-09 LAB — INR PPP: 4.1

## 2020-10-09 NOTE — PROGRESS NOTES
Anticoagulation Clinic Progress Note    Anticoagulation Summary  As of 10/9/2020    INR goal:  3.0-3.5   TTR:  58.4 % (2 y)   INR used for dosin.10 (10/9/2020)   Warfarin maintenance plan:  5 mg every Sun, Tue, Thu; 7.5 mg all other days   Weekly warfarin total:  45 mg   Plan last modified:  Vasquez Niño RPH (2020)   Next INR check:     Priority:  Maintenance   Target end date:  Indefinite    Indications    Hx of aortic valve replacement  mechanical [Z95.2] [Z95.2]  Atrial fibrillation (CMS/HCC) [I48.91]  Cerebrovascular accident (CVA) due to embolism of right middle cerebral artery (CMS/HCC) [I63.411]             Anticoagulation Episode Summary     INR check location:      Preferred lab:      Send INR reminders to:   [facility]    Comments:  New INR goal 3 - 3.5 (see 2020 hospitalization for CVA)      Anticoagulation Care Providers     Provider Role Specialty Phone number    Griffin Fried MD Referring Cardiology [phone]          Clinic Interview:  Patient Findings     Negatives:  Signs/symptoms of thrombosis, Signs/symptoms of bleeding,   Laboratory test error suspected, Change in health, Change in alcohol use,   Change in activity, Upcoming invasive procedure, Emergency department   visit, Upcoming dental procedure, Missed doses, Extra doses, Change in   medications, Change in diet/appetite, Hospital admission, Bruising, Other   complaints      Clinical Outcomes     Negatives:  Major bleeding event, Thromboembolic event,   Anticoagulation-related hospital admission, Anticoagulation-related ED   visit, Anticoagulation-related fatality        INR History:  Anticoagulation Monitoring 2020 10/2/2020 10/9/2020   INR 4.00 3.40 4.10   INR Date 2020 10/2/2020 10/9/2020   INR Goal 3.0-3.5 3.0-3.5 3.0-3.5   Trend Same Same Same   Last Week Total 45 mg 42.5 mg 45 mg   Next Week Total 42.5 mg 45 mg 42.5 mg   Sun 5 mg 5 mg 5 mg   Mon 7.5 mg 7.5 mg 7.5 mg   Tue 5 mg 5 mg 5 mg     Wed 7.5 mg 7.5 mg 7.5 mg   Thu 5 mg 5 mg 5 mg   Fri 5 mg (9/25) 7.5 mg 5 mg (10/9)   Sat 7.5 mg 7.5 mg 7.5 mg   Visit Report - - -   Some recent data might be hidden       Plan:  1. INR is Supratherapeutic today- see above in Anticoagulation Summary.   Will instruct Francisco Sequeira to Change their warfarin regimen- see above in Anticoagulation Summary.  2. Follow up in 1 week  3. They have been instructed to call if any changes in medications, doses, concerns, etc. Patient expresses understanding and has no further questions at this time.    Bethanie Sutton [facility]

## 2020-10-16 LAB — INR PPP: 3.2

## 2020-10-23 ENCOUNTER — ANTICOAGULATION VISIT (OUTPATIENT)
Dept: PHARMACY | Facility: HOSPITAL | Age: 85
End: 2020-10-23

## 2020-10-23 DIAGNOSIS — I63.411 CEREBROVASCULAR ACCIDENT (CVA) DUE TO EMBOLISM OF RIGHT MIDDLE CEREBRAL ARTERY (HCC): ICD-10-CM

## 2020-10-23 DIAGNOSIS — Z95.2 HX OF AORTIC VALVE REPLACEMENT, MECHANICAL: ICD-10-CM

## 2020-10-23 LAB — INR PPP: 4

## 2020-10-23 NOTE — PROGRESS NOTES
Anticoagulation Clinic Progress Note    Anticoagulation Summary  As of 10/23/2020    INR goal:  3.0-3.5   TTR:  58.0 % (2 y)   INR used for dosin.00 (10/23/2020)   Warfarin maintenance plan:  7.5 mg every Mon, Wed, Sat; 5 mg all other days   Weekly warfarin total:  42.5 mg   Plan last modified:  Vasquez Niño RPH (10/23/2020)   Next INR check:  10/30/2020   Priority:  Maintenance   Target end date:  Indefinite    Indications    Hx of aortic valve replacement  mechanical [Z95.2] [Z95.2]  Atrial fibrillation (CMS/HCC) [I48.91]  Cerebrovascular accident (CVA) due to embolism of right middle cerebral artery (CMS/HCC) [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]             Anticoagulation Episode Summary     INR check location:      Preferred lab:      Send INR reminders to:   [facility]    Comments:  New INR goal 3 - 3.5 (see 2020 hospitalization for CVA)      Anticoagulation Care Providers     Provider Role Specialty Phone number    Griffin Fried MD Referring Cardiology [phone]          Clinic Interview:  Patient Findings     Positives:  Change in alcohol use, Other complaints    Negatives:  Signs/symptoms of thrombosis, Signs/symptoms of bleeding,   Laboratory test error suspected, Change in health, Change in activity,   Upcoming invasive procedure, Emergency department visit, Upcoming dental   procedure, Missed doses, Extra doses, Change in medications, Change in   diet/appetite, Hospital admission, Bruising    Comments:  Reports small amt of wine 2 days ago. Actually took 5 mg on   10/16 rather than 7.5 mg.      Clinical Outcomes     Negatives:  Major bleeding event, Thromboembolic event,   Anticoagulation-related hospital admission, Anticoagulation-related ED   visit, Anticoagulation-related fatality    Comments:  Reports small amt of wine 2 days ago. Actually took 5 mg on   10/16 rather than 7.5 mg.        INR History:  Anticoagulation Monitoring 10/9/2020 10/23/2020 10/30/2020   INR 4.10 4.00 6.30   INR Date  10/9/2020 10/23/2020 10/30/2020   INR Goal 3.0-3.5 3.0-3.5 3.0-3.5   Trend Same Down Down   Last Week Total 45 mg 42.5 mg 42.5 mg   Next Week Total 42.5 mg 42.5 mg 35 mg   Sun 5 mg 5 mg 5 mg   Mon 7.5 mg 7.5 mg 7.5 mg   Tue 5 mg 5 mg 5 mg   Wed 7.5 mg 7.5 mg -   Thu 5 mg 5 mg -   Fri 5 mg (10/9) 5 mg Hold (10/30)   Sat 7.5 mg 7.5 mg 5 mg   Visit Report - - -   Some recent data might be hidden       Plan:  1. INR is Supratherapeutic today- see above in Anticoagulation Summary.   Will instruct Francisco Sequeira to Change their warfarin regimen- see above in Anticoagulation Summary.  2. Follow up in 1 week  3. They have been instructed to call if any changes in medications, doses, concerns, etc. Patient expresses understanding and has no further questions at this time.    Vasquez Niño [facility]

## 2020-10-30 ENCOUNTER — ANTICOAGULATION VISIT (OUTPATIENT)
Dept: PHARMACY | Facility: HOSPITAL | Age: 85
End: 2020-10-30

## 2020-10-30 DIAGNOSIS — I63.411 CEREBROVASCULAR ACCIDENT (CVA) DUE TO EMBOLISM OF RIGHT MIDDLE CEREBRAL ARTERY (HCC): ICD-10-CM

## 2020-10-30 DIAGNOSIS — Z95.2 HX OF AORTIC VALVE REPLACEMENT, MECHANICAL: ICD-10-CM

## 2020-10-30 LAB — INR PPP: 6.3

## 2020-10-30 NOTE — PROGRESS NOTES
Anticoagulation Clinic Progress Note    Anticoagulation Summary  As of 10/30/2020    INR goal:  3.0-3.5   TTR:  57.5 % (2.1 y)   INR used for dosin.30 (10/30/2020)   Warfarin maintenance plan:  7.5 mg every Mon, Wed; 5 mg all other days   Weekly warfarin total:  40 mg   Plan last modified:  Vasquez Niño RPH (10/30/2020)   Next INR check:  2020   Priority:  Maintenance   Target end date:  Indefinite    Indications    Hx of aortic valve replacement  mechanical [Z95.2] [Z95.2]  Atrial fibrillation (CMS/HCC) [I48.91]  Cerebrovascular accident (CVA) due to embolism of right middle cerebral artery (CMS/HCC) [I63.411]             Anticoagulation Episode Summary     INR check location:      Preferred lab:      Send INR reminders to:   [facility]    Comments:  New INR goal 3 - 3.5 (see 2020 hospitalization for CVA)      Anticoagulation Care Providers     Provider Role Specialty Phone number    Griffin Fried MD Referring Cardiology [phone]          Clinic Interview:  Patient Findings     Negatives:  Signs/symptoms of thrombosis, Signs/symptoms of bleeding,   Laboratory test error suspected, Change in health, Change in alcohol use,   Change in activity, Upcoming invasive procedure, Emergency department   visit, Upcoming dental procedure, Missed doses, Extra doses, Change in   medications, Change in diet/appetite, Hospital admission, Bruising, Other   complaints      Clinical Outcomes     Negatives:  Major bleeding event, Thromboembolic event,   Anticoagulation-related hospital admission, Anticoagulation-related ED   visit, Anticoagulation-related fatality        INR History:  Anticoagulation Monitoring 10/9/2020 10/23/2020 10/30/2020   INR 4.10 4.00 6.30   INR Date 10/9/2020 10/23/2020 10/30/2020   INR Goal 3.0-3.5 3.0-3.5 3.0-3.5   Trend Same Down Down   Last Week Total 45 mg 42.5 mg 42.5 mg   Next Week Total 42.5 mg 42.5 mg 35 mg   Sun 5 mg 5 mg 5 mg   Mon 7.5 mg 7.5 mg 7.5 mg   Tue  5 mg 5 mg 5 mg   Wed 7.5 mg 7.5 mg -   Thu 5 mg 5 mg -   Fri 5 mg (10/9) 5 mg Hold (10/30)   Sat 7.5 mg 7.5 mg 5 mg   Visit Report - - -   Some recent data might be hidden       Plan:  1. INR is Supratherapeutic today- see above in Anticoagulation Summary.   Will instruct Francisco AMIN Hua to Change their warfarin regimen- see above in Anticoagulation Summary.  2. Follow up in 5 days  3. They have been instructed to call if any changes in medications, doses, concerns, etc. To seek immediate medical attention if s/sx of bleeding develop or fall occurs. Patient expresses understanding and has no further questions at this time.    Vasquez Niño RP

## 2020-11-03 RX ORDER — WARFARIN SODIUM 5 MG/1
TABLET ORAL
Qty: 100 TABLET | Refills: 1 | Status: SHIPPED | OUTPATIENT
Start: 2020-11-03 | End: 2020-12-09 | Stop reason: SDUPTHER

## 2020-11-04 ENCOUNTER — ANTICOAGULATION VISIT (OUTPATIENT)
Dept: PHARMACY | Facility: HOSPITAL | Age: 85
End: 2020-11-04

## 2020-11-04 DIAGNOSIS — I63.411 CEREBROVASCULAR ACCIDENT (CVA) DUE TO EMBOLISM OF RIGHT MIDDLE CEREBRAL ARTERY (HCC): ICD-10-CM

## 2020-11-04 DIAGNOSIS — Z95.2 HX OF AORTIC VALVE REPLACEMENT, MECHANICAL: ICD-10-CM

## 2020-11-04 LAB — INR PPP: 1.9

## 2020-11-04 NOTE — PROGRESS NOTES
Anticoagulation Clinic Progress Note    Anticoagulation Summary  As of 2020    INR goal:  3.0-3.5   TTR:  57.2 % (2.1 y)   INR used for dosin.90 (2020)   Warfarin maintenance plan:  7.5 mg every Mon, Wed; 5 mg all other days   Weekly warfarin total:  40 mg   Plan last modified:  Vasquez Niño RPH (10/30/2020)   Next INR check:  2020   Priority:  Maintenance   Target end date:  Indefinite    Indications    Hx of aortic valve replacement  mechanical [Z95.2] [Z95.2]  Atrial fibrillation (CMS/HCC) [I48.91]  Cerebrovascular accident (CVA) due to embolism of right middle cerebral artery (CMS/HCC) [I63.411]             Anticoagulation Episode Summary     INR check location:      Preferred lab:      Send INR reminders to:   [facility]    Comments:  New INR goal 3 - 3.5 (see 2020 hospitalization for CVA)      Anticoagulation Care Providers     Provider Role Specialty Phone number    Griffin Fried MD Referring Cardiology [phone]          Clinic Interview:  Patient Findings     Negatives:  Signs/symptoms of thrombosis, Signs/symptoms of bleeding,   Laboratory test error suspected, Change in health, Change in alcohol use,   Change in activity, Upcoming invasive procedure, Emergency department   visit, Upcoming dental procedure, Missed doses, Extra doses, Change in   medications, Change in diet/appetite, Hospital admission, Bruising, Other   complaints      Clinical Outcomes     Negatives:  Major bleeding event, Thromboembolic event,   Anticoagulation-related hospital admission, Anticoagulation-related ED   visit, Anticoagulation-related fatality        INR History:  Anticoagulation Monitoring 10/23/2020 10/30/2020 2020   INR 4.00 6.30 1.90   INR Date 10/23/2020 10/30/2020 2020   INR Goal 3.0-3.5 3.0-3.5 3.0-3.5   Trend Down Down Same   Last Week Total 42.5 mg 42.5 mg 35 mg   Next Week Total 42.5 mg 35 mg 42.5 mg   Sun 5 mg 5 mg -   Mon 7.5 mg 7.5 mg -   Tue 5 mg 5 mg  -   Wed 7.5 mg - 10 mg (11/4)   Thu 5 mg - 5 mg   Fri 5 mg Hold (10/30) -   Sat 7.5 mg 5 mg -   Visit Report - - -   Some recent data might be hidden       Plan:  1. INR is Subtherapeutic today- see above in Anticoagulation Summary. INR very labile, so will not bridge due to concern that INR may elevate quickly.  Will instruct Francisco Sequeira to Change their warfarin regimen- see above in Anticoagulation Summary.  2. Follow up in 2 days  3. They have been instructed to call if any changes in medications, doses, concerns, etc. Patient expresses understanding and has no further questions at this time.    Vasquez Niño [facility]

## 2020-11-06 ENCOUNTER — ANTICOAGULATION VISIT (OUTPATIENT)
Dept: PHARMACY | Facility: HOSPITAL | Age: 85
End: 2020-11-06

## 2020-11-06 DIAGNOSIS — Z95.2 HX OF AORTIC VALVE REPLACEMENT, MECHANICAL: ICD-10-CM

## 2020-11-06 DIAGNOSIS — I63.411 CEREBROVASCULAR ACCIDENT (CVA) DUE TO EMBOLISM OF RIGHT MIDDLE CEREBRAL ARTERY (HCC): ICD-10-CM

## 2020-11-06 LAB — INR PPP: 3

## 2020-11-06 NOTE — PROGRESS NOTES
Anticoagulation Clinic Progress Note    Anticoagulation Summary  As of 11/6/2020    INR goal:  3.0-3.5   TTR:  57.0 % (2.1 y)   INR used for dosing:  3.00 (11/6/2020)   Warfarin maintenance plan:  7.5 mg every Mon, Wed; 5 mg all other days   Weekly warfarin total:  40 mg   No change documented:  Breanna Melton RPH   Plan last modified:  Vasquez Niño RPH (10/30/2020)   Next INR check:  11/13/2020   Priority:  Maintenance   Target end date:  Indefinite    Indications    Hx of aortic valve replacement  mechanical [Z95.2] [Z95.2]  Atrial fibrillation (CMS/HCC) [I48.91]  Cerebrovascular accident (CVA) due to embolism of right middle cerebral artery (CMS/HCC) [I63.411]             Anticoagulation Episode Summary     INR check location:      Preferred lab:      Send INR reminders to:   [facility]    Comments:  New INR goal 3 - 3.5 (see 1/2020 hospitalization for CVA)      Anticoagulation Care Providers     Provider Role Specialty Phone number    Griffin Fried MD Referring Cardiology [phone]          Clinic Interview:  Patient Findings: no changes   Negatives:  Signs/symptoms of thrombosis, Signs/symptoms of bleeding,   Laboratory test error suspected, Change in health, Change in alcohol use,   Change in activity, Upcoming invasive procedure, Emergency department   visit, Upcoming dental procedure, Missed doses, Extra doses, Change in   medications, Change in diet/appetite, Hospital admission, Bruising, Other   complaints      Clinical Outcomes     Negatives:  Major bleeding event, Thromboembolic event,   Anticoagulation-related hospital admission, Anticoagulation-related ED   visit, Anticoagulation-related fatality        INR History:  Anticoagulation Monitoring 10/30/2020 11/4/2020 11/6/2020   INR 6.30 1.90 3.00   INR Date 10/30/2020 11/4/2020 11/6/2020   INR Goal 3.0-3.5 3.0-3.5 3.0-3.5   Trend Down Same Same   Last Week Total 42.5 mg 35 mg 37.5 mg   Next Week Total 35 mg 42.5 mg 40 mg    Sun 5 mg - 5 mg   Mon 7.5 mg - 7.5 mg   Tue 5 mg - 5 mg   Wed - 10 mg (11/4) 7.5 mg   Thu - 5 mg 5 mg   Fri Hold (10/30) - 5 mg   Sat 5 mg - 5 mg   Visit Report - - -   Some recent data might be hidden       Plan:  1. INR is Therapeutic today- see above in Anticoagulation Summary.   Will instruct Francisco Sequeira to Continue their warfarin regimen- see above in Anticoagulation Summary.  2. Follow up in 1 weeks  3. Spoke to patient's wife. They have been instructed to call if any changes in medications, doses, concerns, etc. Patient expresses understanding and has no further questions at this time.    Breanna Melton [facility]

## 2020-11-13 ENCOUNTER — ANTICOAGULATION VISIT (OUTPATIENT)
Dept: PHARMACY | Facility: HOSPITAL | Age: 85
End: 2020-11-13

## 2020-11-13 DIAGNOSIS — I63.411 CEREBROVASCULAR ACCIDENT (CVA) DUE TO EMBOLISM OF RIGHT MIDDLE CEREBRAL ARTERY (HCC): ICD-10-CM

## 2020-11-13 DIAGNOSIS — IMO0002 UNCONTROLLED TYPE 2 DIABETES MELLITUS WITH COMPLICATION, WITH LONG-TERM CURRENT USE OF INSULIN: ICD-10-CM

## 2020-11-13 DIAGNOSIS — Z95.2 HX OF AORTIC VALVE REPLACEMENT, MECHANICAL: ICD-10-CM

## 2020-11-13 LAB — INR PPP: 3.7

## 2020-11-13 RX ORDER — SUB-Q INSULIN DEVICE, 40 UNIT
EACH MISCELLANEOUS
Qty: 30 EACH | Refills: 2 | Status: SHIPPED | OUTPATIENT
Start: 2020-11-13 | End: 2021-02-17

## 2020-11-13 NOTE — PROGRESS NOTES
Anticoagulation Clinic Progress Note    Anticoagulation Summary  As of 11/13/2020    INR goal:  3.0-3.5   TTR:  57.1 % (2.1 y)   INR used for dosing:  3.70 (11/13/2020)   Warfarin maintenance plan:  7.5 mg every Mon, Wed; 5 mg all other days   Weekly warfarin total:  40 mg   Plan last modified:  Vasquez Niño RPH (10/30/2020)   Next INR check:  11/20/2020   Priority:  Maintenance   Target end date:  Indefinite    Indications    Hx of aortic valve replacement  mechanical [Z95.2] [Z95.2]  Atrial fibrillation (CMS/HCC) [I48.91]  Cerebrovascular accident (CVA) due to embolism of right middle cerebral artery (CMS/HCC) [I63.411]             Anticoagulation Episode Summary     INR check location:      Preferred lab:      Send INR reminders to:   [facility]    Comments:  New INR goal 3 - 3.5 (see 1/2020 hospitalization for CVA)      Anticoagulation Care Providers     Provider Role Specialty Phone number    Griffin Fried MD Referring Cardiology [phone]          Clinic Interview:      INR History:  Anticoagulation Monitoring 11/4/2020 11/6/2020 11/13/2020   INR 1.90 3.00 3.70   INR Date 11/4/2020 11/6/2020 11/13/2020   INR Goal 3.0-3.5 3.0-3.5 3.0-3.5   Trend Same Same Same   Last Week Total 35 mg 37.5 mg 40 mg   Next Week Total 42.5 mg 40 mg 40 mg   Sun - 5 mg 5 mg   Mon - 7.5 mg 7.5 mg   Tue - 5 mg 5 mg   Wed 10 mg (11/4) 7.5 mg 7.5 mg   Thu 5 mg 5 mg 5 mg   Fri - 5 mg 5 mg   Sat - 5 mg 5 mg   Visit Report - - -   Some recent data might be hidden       Plan:  1. INR is Supratherapeutic today- see above in Anticoagulation Summary.   Will instruct Francisco Sequeira to Continue their warfarin regimen- see above in Anticoagulation Summary since patient has a history of significant variations in INR results with minor changes to dosages.  2. Follow up in 1 week  3. Spoke with patient's wife, Gladys, who handles his medications. They have been instructed to call if any changes in medications, doses,  concerns, etc. Patient expresses understanding and has no further questions at this time.    Breanna Melton, [facility]

## 2020-11-15 ENCOUNTER — HOSPITAL ENCOUNTER (EMERGENCY)
Facility: HOSPITAL | Age: 85
Discharge: HOME | End: 2020-11-15
Attending: EMERGENCY MEDICINE | Admitting: EMERGENCY MEDICINE

## 2020-11-15 VITALS
RESPIRATION RATE: 16 BRPM | HEART RATE: 75 BPM | TEMPERATURE: 96.2 F | DIASTOLIC BLOOD PRESSURE: 99 MMHG | BODY MASS INDEX: 24.38 KG/M2 | WEIGHT: 180 LBS | OXYGEN SATURATION: 95 % | SYSTOLIC BLOOD PRESSURE: 150 MMHG | HEIGHT: 72 IN

## 2020-11-15 DIAGNOSIS — R41.82 ALTERED MENTAL STATUS, UNSPECIFIED ALTERED MENTAL STATUS TYPE: ICD-10-CM

## 2020-11-15 DIAGNOSIS — E16.2 HYPOGLYCEMIA: Primary | ICD-10-CM

## 2020-11-15 LAB
ALBUMIN SERPL-MCNC: 3.8 G/DL (ref 3.5–5.2)
ALBUMIN/GLOB SERPL: 1.5 G/DL
ALP SERPL-CCNC: 75 U/L (ref 39–117)
ALT SERPL W P-5'-P-CCNC: 22 U/L (ref 1–41)
ANION GAP SERPL CALCULATED.3IONS-SCNC: 10.1 MMOL/L (ref 5–15)
AST SERPL-CCNC: 22 U/L (ref 1–40)
BASOPHILS # BLD AUTO: 0.04 10*3/MM3 (ref 0–0.2)
BASOPHILS NFR BLD AUTO: 0.8 % (ref 0–1.5)
BILIRUB SERPL-MCNC: 0.4 MG/DL (ref 0–1.2)
BUN SERPL-MCNC: 28 MG/DL (ref 8–23)
BUN/CREAT SERPL: 17.5 (ref 7–25)
CALCIUM SPEC-SCNC: 8.9 MG/DL (ref 8.6–10.5)
CHLORIDE SERPL-SCNC: 106 MMOL/L (ref 98–107)
CO2 SERPL-SCNC: 24.9 MMOL/L (ref 22–29)
CREAT SERPL-MCNC: 1.6 MG/DL (ref 0.76–1.27)
DEPRECATED RDW RBC AUTO: 46.3 FL (ref 37–54)
DIGOXIN SERPL-MCNC: 0.9 NG/ML (ref 0.6–1.2)
EOSINOPHIL # BLD AUTO: 0.07 10*3/MM3 (ref 0–0.4)
EOSINOPHIL NFR BLD AUTO: 1.4 % (ref 0.3–6.2)
ERYTHROCYTE [DISTWIDTH] IN BLOOD BY AUTOMATED COUNT: 14.8 % (ref 12.3–15.4)
GFR SERPL CREATININE-BSD FRML MDRD: 42 ML/MIN/1.73
GLOBULIN UR ELPH-MCNC: 2.6 GM/DL
GLUCOSE BLDC GLUCOMTR-MCNC: 162 MG/DL (ref 70–130)
GLUCOSE BLDC GLUCOMTR-MCNC: 38 MG/DL (ref 70–130)
GLUCOSE BLDC GLUCOMTR-MCNC: 72 MG/DL (ref 70–130)
GLUCOSE SERPL-MCNC: 81 MG/DL (ref 65–99)
HCT VFR BLD AUTO: 41.7 % (ref 37.5–51)
HGB BLD-MCNC: 13.2 G/DL (ref 13–17.7)
IMM GRANULOCYTES # BLD AUTO: 0.01 10*3/MM3 (ref 0–0.05)
IMM GRANULOCYTES NFR BLD AUTO: 0.2 % (ref 0–0.5)
INR PPP: 2.71 (ref 0.9–1.1)
LYMPHOCYTES # BLD AUTO: 1.3 10*3/MM3 (ref 0.7–3.1)
LYMPHOCYTES NFR BLD AUTO: 25.4 % (ref 19.6–45.3)
MCH RBC QN AUTO: 27 PG (ref 26.6–33)
MCHC RBC AUTO-ENTMCNC: 31.7 G/DL (ref 31.5–35.7)
MCV RBC AUTO: 85.5 FL (ref 79–97)
MONOCYTES # BLD AUTO: 0.39 10*3/MM3 (ref 0.1–0.9)
MONOCYTES NFR BLD AUTO: 7.6 % (ref 5–12)
NEUTROPHILS NFR BLD AUTO: 3.31 10*3/MM3 (ref 1.7–7)
NEUTROPHILS NFR BLD AUTO: 64.6 % (ref 42.7–76)
NRBC BLD AUTO-RTO: 0 /100 WBC (ref 0–0.2)
PLATELET # BLD AUTO: 122 10*3/MM3 (ref 140–450)
PMV BLD AUTO: 12.4 FL (ref 6–12)
POTASSIUM SERPL-SCNC: 4.2 MMOL/L (ref 3.5–5.2)
PROT SERPL-MCNC: 6.4 G/DL (ref 6–8.5)
PROTHROMBIN TIME: 28.5 SECONDS (ref 11.7–14.2)
RBC # BLD AUTO: 4.88 10*6/MM3 (ref 4.14–5.8)
SODIUM SERPL-SCNC: 141 MMOL/L (ref 136–145)
WBC # BLD AUTO: 5.12 10*3/MM3 (ref 3.4–10.8)

## 2020-11-15 PROCEDURE — 80053 COMPREHEN METABOLIC PANEL: CPT | Performed by: EMERGENCY MEDICINE

## 2020-11-15 PROCEDURE — 85025 COMPLETE CBC W/AUTO DIFF WBC: CPT | Performed by: EMERGENCY MEDICINE

## 2020-11-15 PROCEDURE — 82962 GLUCOSE BLOOD TEST: CPT

## 2020-11-15 PROCEDURE — 96374 THER/PROPH/DIAG INJ IV PUSH: CPT

## 2020-11-15 PROCEDURE — 80162 ASSAY OF DIGOXIN TOTAL: CPT | Performed by: EMERGENCY MEDICINE

## 2020-11-15 PROCEDURE — 99284 EMERGENCY DEPT VISIT MOD MDM: CPT

## 2020-11-15 PROCEDURE — 85610 PROTHROMBIN TIME: CPT | Performed by: EMERGENCY MEDICINE

## 2020-11-15 RX ORDER — DEXTROSE MONOHYDRATE 25 G/50ML
25 INJECTION, SOLUTION INTRAVENOUS
Status: DISCONTINUED | OUTPATIENT
Start: 2020-11-15 | End: 2020-11-15 | Stop reason: HOSPADM

## 2020-11-15 RX ADMIN — DEXTROSE MONOHYDRATE 25 ML: 500 INJECTION PARENTERAL at 08:37

## 2020-11-15 NOTE — ED PROVIDER NOTES
" EMERGENCY DEPARTMENT ENCOUNTER    CHIEF COMPLAINT  Chief Complaint: Altered mental status  History given by: Wife  History limited by: Patient with decreased responsiveness  Room Number: 09/09  PMD: Rubin Hinkle APRN      HPI:  Pt is a 84 y.o. male presents brought by EMS from home with wife at bedside complaining of decreased responsiveness and interaction at home this morning.  Wife reports the last time the patient was normally was at 9 PM.  Wife reports when she woke the patient at 6 AM he had abnormal response and interaction awoke but answered \"I do not know\" to all questions including when she asked him what her name was.  Wife denies recent falls, head injury, cough, fever, nausea/vomiting, changes to medications.  She denies unilateral weakness or numbness, speech disturbance.  She reports the patient is on a disposable insulin pump which he uses as directed.  EMS reported the patient had a blood sugar in the 50s in route.  Patient did not receive any medications prior to arrival.  In the ED Accu-Chek was 38.    Duration: 2-1/2 hours  Associated Symptoms: None  Aggravating Factors: Unknown  Alleviating Factors: D50 IV in the ER  Treatment before arrival: Nothing    PAST MEDICAL HISTORY  Active Ambulatory Problems     Diagnosis Date Noted   • Atrial fibrillation (CMS/[facility])    • Hypertension    • Atopic rhinitis 03/21/2016   • Uncontrolled type 2 diabetes mellitus (CMS/HCC) 03/21/2016   • Gastroesophageal reflux disease 03/21/2016   • Hyperlipidemia 03/21/2016   • Uncontrolled type 2 diabetes mellitus with complication, with long-term current use of insulin (CMS/HCC) 03/21/2016   • Renal insufficiency 03/31/2017   • Low testosterone 04/03/2017   • Special screening for malignant neoplasm of prostate 10/30/2017   • Hx of aortic valve replacement, mechanical [Z95.2] 09/19/2018   • Stroke-like symptom 01/13/2020   • Kidney carcinoma (CMS/HCC) 01/13/2020   • CKD (chronic kidney disease) stage 3, GFR 30-59 " ml/min 01/13/2020   • BYRON (acute kidney injury) (CMS/[facility]) 01/14/2020   • Acute cerebral infarction (CMS/[facility]) 01/14/2020   • Cerebrovascular accident (CVA) due to embolism of right middle cerebral artery (CMS/[facility]) 04/09/2020     Resolved Ambulatory Problems     Diagnosis Date Noted   • Cardiomyopathy (CMS/[facility])    • Poison ivy 09/06/2016   • Low testosterone 04/07/2017   • Sinusitis 05/01/2018   • Hx of mitral valve replacement with mechanical valve [Z95.2] 09/19/2018   • Screening for iron deficiency anemia 11/01/2018     Past Medical History:   Diagnosis Date   • Abnormal electrocardiogram    • Allergic rhinitis    • Aortic valve insufficiency    • Ascending aortic aneurysm (CMS/[facility])    • Bacteremia    • Calcific aortic stenosis of bicuspid valve    • Cardiac arrest (CMS/[facility])    • Contact dermatitis due to poison ivy    • Diabetes mellitus (CMS/[facility])    • Elbow fracture    • Esophageal reflux    • GERD (gastroesophageal reflux disease)    • Head injury    • Hyperglycemia    • Leg swelling    • Localized swelling of both lower legs    • Nasal congestion    • Nasal drainage    • Nonischemic cardiomyopathy (CMS/[facility])    • Palpitations    • Pedal edema    • Pleural effusion on left    • Renal insufficiency syndrome    • Renal oncocytoma    • Seasonal allergic reaction    • Syncope    • Type 2 diabetes mellitus (CMS/[facility])    • Vision changes    • Visual field defect        PAST SURGICAL HISTORY  Past Surgical History:   Procedure Laterality Date   • AORTIC VALVE REPAIR/REPLACEMENT     • ASCENDING AORTIC ANEURYSM REPAIR W/ MECHANICAL AORTIC VALVE REPLACEMENT     • NEPHRECTOMY     • OTHER SURGICAL HISTORY      elbow surgery   • PROSTATE SURGERY     • THORACENTESIS Left     diagnostic       FAMILY HISTORY  Family History   Problem Relation Age of Onset   • Cancer Mother         colon   • Cancer Brother         colon       SOCIAL HISTORY  Social History     Socioeconomic History   • Marital status:      Spouse name: Not  on file   • Number of children: Not on file   • Years of education: Not on file   • Highest education level: Not on file   Tobacco Use   • Smoking status: Former Smoker   • Smokeless tobacco: Former User   • Tobacco comment: caffeine use   Substance and Sexual Activity   • Alcohol use: Yes     Comment: occasional   • Drug use: No   • Sexual activity: Defer       ALLERGIES  Other, Penicillins, and Percocet  [oxycodone-acetaminophen]    REVIEW OF SYSTEMS  Review of Systems   Unable to perform ROS: Mental status change       PHYSICAL EXAM  ED Triage Vitals   Temp Heart Rate Resp BP SpO2   11/15/20 0821 11/15/20 0819 11/15/20 0820 11/15/20 0819 11/15/20 0819   96.2 °F (35.7 °C) 74 16 146/84 99 %      Temp src Heart Rate Source Patient Position BP Location FiO2 (%)   11/15/20 0821 -- -- -- --   Tympanic           Physical Exam   Constitutional: He appears distressed.   Staring, slow answers initially which rapidly improves after IV D50   HENT:   Head: Normocephalic and atraumatic.   Eyes: EOM are normal.   Neck: Normal range of motion.   Cardiovascular: Normal rate, regular rhythm and normal heart sounds.   No murmur heard.  Pulses:       Posterior tibial pulses are 2+ on the right side and 2+ on the left side.   Pulmonary/Chest: Effort normal and breath sounds normal. No respiratory distress. He has no wheezes.   Abdominal: Soft. Bowel sounds are normal. There is no abdominal tenderness. There is no rebound and no guarding.   Musculoskeletal: Normal range of motion.         General: No edema.   Neurological: He is alert.   Patient initially unable to answer the month,   Skin: Skin is warm and dry.   Psychiatric:   Affect initially flat, staring with monosyllabic answers   Nursing note and vitals reviewed.      LAB RESULTS  Lab Results (last 24 hours)     Procedure Component Value Units Date/Time    POC Glucose Once [[id number]]  (Abnormal) Collected: 11/15/20 0827    Specimen: Blood Updated: 11/15/20 0828     Glucose 38  mg/dL     POC Glucose Once [[id number]]  (Normal) Collected: 11/15/20 0846    Specimen: Blood Updated: 11/15/20 0847     Glucose 72 mg/dL     CBC & Differential [[id number]]  (Abnormal) Collected: 11/15/20 0856    Specimen: Blood Updated: 11/15/20 0923    Narrative:      The following orders were created for panel order CBC & Differential.  Procedure                               Abnormality         Status                     ---------                               -----------         ------                     CBC Auto Differential[[id number]]        Abnormal            Final result                 Please view results for these tests on the individual orders.    Comprehensive Metabolic Panel [[id number]]  (Abnormal) Collected: 11/15/20 0856    Specimen: Blood Updated: 11/15/20 0936     Glucose 81 mg/dL      BUN 28 mg/dL      Creatinine 1.60 mg/dL      Sodium 141 mmol/L      Potassium 4.2 mmol/L      Comment: Slight hemolysis detected by analyzer. Results may be affected.        Chloride 106 mmol/L      CO2 24.9 mmol/L      Calcium 8.9 mg/dL      Total Protein 6.4 g/dL      Albumin 3.80 g/dL      ALT (SGPT) 22 U/L      AST (SGOT) 22 U/L      Alkaline Phosphatase 75 U/L      Total Bilirubin 0.4 mg/dL      eGFR Non African Amer 42 mL/min/1.73      Globulin 2.6 gm/dL      A/G Ratio 1.5 g/dL      BUN/Creatinine Ratio 17.5     Anion Gap 10.1 mmol/L     Narrative:      GFR Normal >60  Chronic Kidney Disease <60  Kidney Failure <15      Protime-INR [[id number]]  (Abnormal) Collected: 11/15/20 0856    Specimen: Blood Updated: 11/15/20 0935     Protime 28.5 Seconds      INR 2.71    Digoxin Level [[id number]]  (Normal) Collected: 11/15/20 0856    Specimen: Blood Updated: 11/15/20 0936     Digoxin 0.90 ng/mL     CBC Auto Differential [[id number]]  (Abnormal) Collected: 11/15/20 0856    Specimen: Blood Updated: 11/15/20 0923     WBC 5.12 10*3/mm3      RBC 4.88 10*6/mm3      Hemoglobin 13.2 g/dL      Hematocrit 41.7 %      MCV 85.5 fL       MCH 27.0 pg      MCHC 31.7 g/dL      RDW 14.8 %      RDW-SD 46.3 fl      MPV 12.4 fL      Platelets 122 10*3/mm3      Neutrophil % 64.6 %      Lymphocyte % 25.4 %      Monocyte % 7.6 %      Eosinophil % 1.4 %      Basophil % 0.8 %      Immature Grans % 0.2 %      Neutrophils, Absolute 3.31 10*3/mm3      Lymphocytes, Absolute 1.30 10*3/mm3      Monocytes, Absolute 0.39 10*3/mm3      Eosinophils, Absolute 0.07 10*3/mm3      Basophils, Absolute 0.04 10*3/mm3      Immature Grans, Absolute 0.01 10*3/mm3      nRBC 0.0 /100 WBC     POC Glucose Once [[id number]]  (Abnormal) Collected: 11/15/20 1046    Specimen: Blood Updated: 11/15/20 1048     Glucose 162 mg/dL           I ordered the above labs and reviewed the results        PROCEDURES  Procedures      PROGRESS AND CONSULTS  ED Course as of Nov 15 1734   Sun Nov 15, 2020   1233 Patient seen and reexamined, I gait at his baseline in the emergency department today, has eaten a full meal here blood sugars have stayed stable.  Discussed with patient and wife regarding his slow blood sugars as a culprit for his presentation and need to decrease his Tresiba at night.  Wife wants to completely discontinue Tresiba in the-encouraged her to take at least half the dose until she can discuss with patient primary care provider for further testing, medication adjustment, treatment as needed    [TO]      ED Course User Index  [TO] Balbina Garcia MD           MEDICAL DECISION MAKING  Results were reviewed/discussed with the patient and they were also made aware of online access. Pt also made aware that some labs, such as cultures, will not be resulted during ER visit and follow up with PMD is necessary.       MDM       DIAGNOSIS  Final diagnoses:   Hypoglycemia   Altered mental status, unspecified altered mental status type       DISPOSITION  DISCHARGE    Patient discharged in stable condition.    Reviewed implications of results, diagnosis, meds, responsibility to follow up, warning  signs and symptoms of possible worsening, potential complications and reasons to return to ER, including altered mental status, fever, cough, unilateral weakness or numbness, new visual or speech disturbance or other concerns.  .    Patient/Family voiced understanding of above instructions.    Discussed plan for discharge, as there is no emergent indication for admission. Patient referred to primary care provider for BP management due to today's BP. Pt/family is agreeable and understands need for follow up and repeat testing.  Pt is aware that discharge does not mean that nothing is wrong but it indicates no emergency is present that requires admission and they must continue care with follow-up as given below or physician of their choice.     FOLLOW-UP  Rubin Hinkle, APRN  [address] [address]  [phone]    Schedule an appointment as soon as possible for a visit in 3 days  EVEN IF WELL         Medication List      Changed    aspirin 81 MG tablet  Take 1 tablet by mouth Daily.  What changed: how much to take              Latest Documented Vital Signs:  As of 17:34 EST  BP- 150/99 HR- 75 Temp- 96.2 °F (35.7 °C) (Tympanic) O2 sat- 95%    --  Patient was wearing facemask when I entered the room and throughout our encounter. Full protective equipment was worn throughout this patient encounter including a face mask, eye protection and gloves. Hand hygiene was performed before donning protective equipment and after removal when leaving the room.      Balbina Garcia MD  11/15/20 4743

## 2020-11-15 NOTE — ED TRIAGE NOTES
Pt arrived via ems from home with complaints of AMS. LKN last night at 2100. Wife stated to ems that pt sleeps in a recliner in the living room. Pts wife  found him confused and soiled with urine. Pt has had a previous stroke last January. Pts Glucose was 56 upon arrival. No intervention was given in route.     Pt was wearing a mask during assessment.  This RN wore appropriate PPE

## 2020-11-15 NOTE — DISCHARGE INSTRUCTIONS
You are advised to follow closely with Dana Hinkle in 1-2 days for recheck, final results of lab work and imaging testing, medication adjustment and further testing/treatment as needed.    Continue your Rich use as previously directed.  Regular meals with frequently snacks over the next couple of days.  Please do not completely discontinued your Tresiba.  Please take at least half the dose until you can discuss the symptoms and your concerns with your primary care provider and agree on dosage adjustments going forward.    Your INR is 2.7 today-please discuss with your cardiologist/Coumadin clinic for instructions for adjustment of dosing.    Please return to the emergency department immediately with chest pain different than usual for you, shortness of air, abdominal pain, persistent vomiting/fever, blood in emesis or stool, lightheadedness/fainting, problems with speech, one sided weakness/numbness, new incontinence, problems with vision, altered mental status or for worsening of symptoms or other concerns.

## 2020-11-15 NOTE — ED NOTES
Pt verbalized understanding to f/u with his pcp and to return to the ER if symptoms are worse.      nOiel Hernandez RN  11/15/20 1300

## 2020-11-20 ENCOUNTER — ANTICOAGULATION VISIT (OUTPATIENT)
Dept: PHARMACY | Facility: HOSPITAL | Age: 85
End: 2020-11-20

## 2020-11-20 DIAGNOSIS — I63.411 CEREBROVASCULAR ACCIDENT (CVA) DUE TO EMBOLISM OF RIGHT MIDDLE CEREBRAL ARTERY (HCC): ICD-10-CM

## 2020-11-20 DIAGNOSIS — Z95.2 HX OF AORTIC VALVE REPLACEMENT, MECHANICAL: ICD-10-CM

## 2020-11-20 LAB — INR PPP: 4.6

## 2020-11-20 NOTE — PROGRESS NOTES
Anticoagulation Clinic Progress Note    Anticoagulation Summary  As of 2020    INR goal:  3.0-3.5   TTR:  56.9 % (2.1 y)   INR used for dosin.60 (2020)   Warfarin maintenance plan:  7.5 mg every Mon, Wed; 5 mg all other days   Weekly warfarin total:  40 mg   Plan last modified:  Vasquez Niño RPH (10/30/2020)   Next INR check:  2020   Priority:  Maintenance   Target end date:  Indefinite    Indications    Hx of aortic valve replacement  mechanical [Z95.2] [Z95.2]  Atrial fibrillation (CMS/HCC) [I48.91]  Cerebrovascular accident (CVA) due to embolism of right middle cerebral artery (CMS/HCC) [I63.411]             Anticoagulation Episode Summary     INR check location:      Preferred lab:      Send INR reminders to:   [facility]    Comments:  New INR goal 3 - 3.5 (see 2020 hospitalization for CVA)      Anticoagulation Care Providers     Provider Role Specialty Phone number    Griffin Fried MD Referring Cardiology [phone]        Clinic Interview:  Patient Findings     Positives:  Emergency department visit, Change in medications    Negatives:  Signs/symptoms of thrombosis, Signs/symptoms of bleeding,   Laboratory test error suspected, Change in health, Change in alcohol use,   Change in activity, Upcoming invasive procedure, Upcoming dental   procedure, Missed doses, Extra doses, Change in diet/appetite, Hospital   admission, Bruising, Other complaints    Comments:  Patient has reduced dose of Tresiba to approximately 1/2 prior   dosage per Dr. Garcia's recommendation from 11/15 ER Visit.        Clinical Outcomes     Negatives:  Major bleeding event, Thromboembolic event,   Anticoagulation-related hospital admission, Anticoagulation-related ED   visit, Anticoagulation-related fatality      INR History:  Anticoagulation Monitoring 2020   INR 3.00 3.70 4.60   INR Date 2020   INR Goal 3.0-3.5 3.0-3.5 3.0-3.5   Trend  Same Same Same   Last Week Total 37.5 mg 40 mg 40 mg   Next Week Total 40 mg 40 mg 35 mg   Sun 5 mg 5 mg 5 mg   Mon 7.5 mg 7.5 mg -   Tue 5 mg 5 mg -   Wed 7.5 mg 7.5 mg -   Thu 5 mg 5 mg -   Fri 5 mg 5 mg 2.5 mg (11/20)   Sat 5 mg 5 mg 2.5 mg (11/21)   Visit Report - - -   Some recent data might be hidden     Plan:  1. INR is Supratherapeutic today - see above in Anticoagulation Summary.   Will instruct Francisco AMIN Hua to Change their warfarin regimen for reduced dose on 11/20 and 11/21 given INR goal range, mechanical aortic valve, prior CVA, and history of INR drop to subtherapeutic when dose was held- see above in Anticoagulation Summary.  Patient's Wife, Gladys, denies any bleeding or bruising.    2. Follow up in Monday 11/23/20.    3. Spoke with patient's wife, Gladys, who handles his medications.  They have been instructed to call if any changes in medications, doses, concerns, etc. Patient expresses understanding and has no further questions at this time.    Braydon [facility]

## 2020-11-23 ENCOUNTER — ANTICOAGULATION VISIT (OUTPATIENT)
Dept: PHARMACY | Facility: HOSPITAL | Age: 85
End: 2020-11-23

## 2020-11-23 DIAGNOSIS — Z95.2 HX OF AORTIC VALVE REPLACEMENT, MECHANICAL: ICD-10-CM

## 2020-11-23 DIAGNOSIS — I63.411 CEREBROVASCULAR ACCIDENT (CVA) DUE TO EMBOLISM OF RIGHT MIDDLE CEREBRAL ARTERY (HCC): ICD-10-CM

## 2020-11-23 LAB — INR PPP: 2.5

## 2020-11-23 NOTE — PROGRESS NOTES
Anticoagulation Clinic Progress Note    Anticoagulation Summary  As of 2020    INR goal:  3.0-3.5   TTR:  56.8 % (2.1 y)   INR used for dosin.50 (2020)   Warfarin maintenance plan:  7.5 mg every Mon, Wed; 5 mg all other days   Weekly warfarin total:  40 mg   No change documented:  Vasquez Niño RPH   Plan last modified:  Vasquez Niño RPH (10/30/2020)   Next INR check:  2020   Priority:  Maintenance   Target end date:  Indefinite    Indications    Hx of aortic valve replacement  mechanical [Z95.2] [Z95.2]  Atrial fibrillation (CMS/HCC) [I48.91]  Cerebrovascular accident (CVA) due to embolism of right middle cerebral artery (CMS/HCC) [I63.411]             Anticoagulation Episode Summary     INR check location:      Preferred lab:      Send INR reminders to:   [facility]    Comments:  New INR goal 3 - 3.5 (see 2020 hospitalization for CVA)      Anticoagulation Care Providers     Provider Role Specialty Phone number    Griffin Fried MD Referring Cardiology [phone]          Clinic Interview:  Patient Findings     Positives:  Change in diet/appetite    Negatives:  Signs/symptoms of thrombosis, Signs/symptoms of bleeding,   Laboratory test error suspected, Change in health, Change in alcohol use,   Change in activity, Upcoming invasive procedure, Emergency department   visit, Upcoming dental procedure, Missed doses, Extra doses, Change in   medications, Hospital admission, Bruising, Other complaints    Comments:  Reports salad yesterday for his birthday.      Clinical Outcomes     Negatives:  Major bleeding event, Thromboembolic event,   Anticoagulation-related hospital admission, Anticoagulation-related ED   visit, Anticoagulation-related fatality    Comments:  Reports salad yesterday for his birthday.        INR History:  Anticoagulation Monitoring 2020   INR 3.70 4.60 2.50   INR Date 2020   INR Goal 3.0-3.5  3.0-3.5 3.0-3.5   Trend Same Same Same   Last Week Total 40 mg 40 mg 35 mg   Next Week Total 40 mg 35 mg 40 mg   Sun 5 mg 5 mg -   Mon 7.5 mg - 7.5 mg   Tue 5 mg - 5 mg   Wed 7.5 mg - 7.5 mg   Thu 5 mg - 5 mg   Fri 5 mg 2.5 mg (11/20) -   Sat 5 mg 2.5 mg (11/21) -   Visit Report - - -   Some recent data might be hidden       Plan:  1. INR is Subtherapeutic today- see above in Anticoagulation Summary.  Will instruct Francisco Sequeira to Continue their warfarin regimen- see above in Anticoagulation Summary.  2. Follow up in 4 days  3. Patient declines warfarin refills.  4. Verbal and written information provided. Patient expresses understanding and has no further questions at this time.    Vasquez Niño [facility]

## 2020-11-27 ENCOUNTER — ANTICOAGULATION VISIT (OUTPATIENT)
Dept: PHARMACY | Facility: HOSPITAL | Age: 85
End: 2020-11-27

## 2020-11-27 DIAGNOSIS — Z95.2 HX OF AORTIC VALVE REPLACEMENT, MECHANICAL: ICD-10-CM

## 2020-11-27 DIAGNOSIS — I63.411 CEREBROVASCULAR ACCIDENT (CVA) DUE TO EMBOLISM OF RIGHT MIDDLE CEREBRAL ARTERY (HCC): ICD-10-CM

## 2020-11-27 LAB — INR PPP: 2.6

## 2020-11-27 NOTE — PROGRESS NOTES
Anticoagulation Clinic Progress Note    Anticoagulation Summary  As of 2020    INR goal:  3.0-3.5   TTR:  56.5 % (2.1 y)   INR used for dosin.60 (2020)   Warfarin maintenance plan:  7.5 mg every Mon, Wed; 5 mg all other days   Weekly warfarin total:  40 mg   Plan last modified:  Vasquez Niño RPH (10/30/2020)   Next INR check:  2020   Priority:  Maintenance   Target end date:  Indefinite    Indications    Hx of aortic valve replacement  mechanical [Z95.2] [Z95.2]  Atrial fibrillation (CMS/HCC) [I48.91]  Cerebrovascular accident (CVA) due to embolism of right middle cerebral artery (CMS/HCC) [I63.411]             Anticoagulation Episode Summary     INR check location:      Preferred lab:      Send INR reminders to:   [facility]    Comments:  New INR goal 3 - 3.5 (see 2020 hospitalization for CVA)      Anticoagulation Care Providers     Provider Role Specialty Phone number    Griffin Fried MD Referring Cardiology [phone]          Clinic Interview:      INR History:  Anticoagulation Monitoring 2020   INR 4.60 2.50 2.60   INR Date 2020   INR Goal 3.0-3.5 3.0-3.5 3.0-3.5   Trend Same Same Same   Last Week Total 40 mg 35 mg 35 mg   Next Week Total 35 mg 40 mg 42.5 mg   Sun 5 mg - 5 mg   Mon - 7.5 mg 7.5 mg   Tue - 5 mg 5 mg   Wed - 7.5 mg -   Thu - 5 mg -   Fri 2.5 mg () - 7.5 mg ()   Sat 2.5 mg () - 5 mg   Visit Report - - -   Some recent data might be hidden       Plan:  1. INR is Subtherapeutic today- see above in Anticoagulation Summary.   Will instruct Francisco Sequeira to boost today only to 7.5mg then continue their warfarin regimen- see above in Anticoagulation Summary.  2. Follow up in 5 days  3. Spoke with Gladys today. They have been instructed to call if any changes in medications, doses, concerns, etc. She expresses understanding and has no further questions at this time.    Audra Vazquez,  RPH

## 2020-12-02 ENCOUNTER — ANTICOAGULATION VISIT (OUTPATIENT)
Dept: PHARMACY | Facility: HOSPITAL | Age: 85
End: 2020-12-02

## 2020-12-02 DIAGNOSIS — Z95.2 HX OF AORTIC VALVE REPLACEMENT, MECHANICAL: ICD-10-CM

## 2020-12-02 DIAGNOSIS — I63.411 CEREBROVASCULAR ACCIDENT (CVA) DUE TO EMBOLISM OF RIGHT MIDDLE CEREBRAL ARTERY (HCC): ICD-10-CM

## 2020-12-02 LAB — INR PPP: 3.6

## 2020-12-02 NOTE — PROGRESS NOTES
Anticoagulation Clinic Progress Note    Anticoagulation Summary  As of 12/2/2020    INR goal:  3.0-3.5   TTR:  56.5 % (2.1 y)   INR used for dosing:  3.60 (12/2/2020)   Warfarin maintenance plan:  7.5 mg every Mon, Thu; 5 mg all other days   Weekly warfarin total:  40 mg   Plan last modified:  Thuy Vazquez, [facility] (12/2/2020)   Next INR check:  12/9/2020   Priority:  Maintenance   Target end date:  Indefinite    Indications    Hx of aortic valve replacement  mechanical [Z95.2] [Z95.2]  Atrial fibrillation (CMS/HCC) [I48.91]  Cerebrovascular accident (CVA) due to embolism of right middle cerebral artery (CMS/HCC) [I63.411]             Anticoagulation Episode Summary     INR check location:      Preferred lab:      Send INR reminders to:   [facility]    Comments:  New INR goal 3 - 3.5 (see 1/2020 hospitalization for CVA)      Anticoagulation Care Providers     Provider Role Specialty Phone number    Griffin Fried MD Referring Cardiology [phone]          Clinic Interview:  No pertinent clinical findings have been reported.    INR History:  Anticoagulation Monitoring 11/23/2020 11/27/2020 12/2/2020   INR 2.50 2.60 3.60   INR Date 11/23/2020 11/27/2020 12/2/2020   INR Goal 3.0-3.5 3.0-3.5 3.0-3.5   Trend Same Same Same   Last Week Total 35 mg 35 mg 42.5 mg   Next Week Total 40 mg 42.5 mg 40 mg   Sun - 5 mg 5 mg   Mon 7.5 mg 7.5 mg 7.5 mg   Tue 5 mg 5 mg 5 mg   Wed 7.5 mg - 5 mg   Thu 5 mg - 7.5 mg   Fri - 7.5 mg (11/27) 5 mg   Sat - 5 mg 5 mg   Visit Report - - -   Some recent data might be hidden       Plan:  1. INR is supratherapeutic today (3.6)- see above in Anticoagulation Summary. Spoke with  Francisco Sequeira's wife Gladys, instructed to continue current warfarin regimen- see above in Anticoagulation Summary.  2. Follow up in 1 week  3. Pt has agreed to only be called if INR out of range. They have been instructed to call if any changes in medications, doses, concerns, etc. Family  expresses understanding and has no further questions at this time.    Thuy Vazquez, [facility]

## 2020-12-09 ENCOUNTER — ANTICOAGULATION VISIT (OUTPATIENT)
Dept: PHARMACY | Facility: HOSPITAL | Age: 85
End: 2020-12-09

## 2020-12-09 DIAGNOSIS — I63.411 CEREBROVASCULAR ACCIDENT (CVA) DUE TO EMBOLISM OF RIGHT MIDDLE CEREBRAL ARTERY (HCC): ICD-10-CM

## 2020-12-09 DIAGNOSIS — Z95.2 HX OF AORTIC VALVE REPLACEMENT, MECHANICAL: ICD-10-CM

## 2020-12-09 LAB — INR PPP: 2.4

## 2020-12-09 RX ORDER — WARFARIN SODIUM 5 MG/1
TABLET ORAL
Qty: 109 TABLET | Refills: 1 | Status: SHIPPED | OUTPATIENT
Start: 2020-12-09 | End: 2021-06-07

## 2020-12-09 NOTE — PROGRESS NOTES
Anticoagulation Clinic Progress Note    Anticoagulation Summary  As of 2020    INR goal:  3.0-3.5   TTR:  56.4 % (2.2 y)   INR used for dosin.40 (2020)   Warfarin maintenance plan:  7.5 mg every Mon, Thu; 5 mg all other days   Weekly warfarin total:  40 mg   Plan last modified:  Thuy Vazquez [facility] (2020)   Next INR check:  2020   Priority:  Maintenance   Target end date:  Indefinite    Indications    Hx of aortic valve replacement  mechanical [Z95.2] [Z95.2]  Atrial fibrillation (CMS/HCC) [I48.91]  Cerebrovascular accident (CVA) due to embolism of right middle cerebral artery (CMS/HCC) [I63.411]             Anticoagulation Episode Summary     INR check location:      Preferred lab:      [facility]    Comments:  New INR goal 3 - 3.5 (see 2020 hospitalization for CVA)      Anticoagulation Care Providers     Provider Role Specialty Phone number    Griffin Fried MD Referring Cardiology [phone]          Clinic Interview:  Patient Findings     Negatives:  Signs/symptoms of thrombosis, Signs/symptoms of bleeding,   Laboratory test error suspected, Change in health, Change in alcohol use,   Change in activity, Upcoming invasive procedure, Emergency department   visit, Upcoming dental procedure, Missed doses, Extra doses, Change in   medications, Change in diet/appetite, Hospital admission, Bruising, Other   complaints      Clinical Outcomes     Negatives:  Major bleeding event, Thromboembolic event,   Anticoagulation-related hospital admission, Anticoagulation-related ED   visit, Anticoagulation-related fatality        INR History:  Anticoagulation Monitoring 2020   INR 2.60 3.60 2.40   INR Date 2020   INR Goal 3.0-3.5 3.0-3.5 3.0-3.5   Trend Same Same Same   Last Week Total 35 mg 42.5 mg 40 mg   Next Week Total 42.5 mg 40 mg 42.5 mg   Sun 5 mg 5 mg 5 mg   Mon 7.5 mg 7.5 mg 7.5 mg   Tue 5  mg 5 mg 5 mg   Wed - 5 mg 7.5 mg (12/9)   Thu - 7.5 mg 7.5 mg   Fri 7.5 mg (11/27) 5 mg 5 mg   Sat 5 mg 5 mg 5 mg   Visit Report - - -   Some recent data might be hidden       Plan:  1. INR is Subtherapeutic today- see above in Anticoagulation Summary.   Will instruct Francisco Sequeira to Change their warfarin regimen- see above in Anticoagulation Summary.  2. Follow up in 1 week  3. Pt has agreed to only be called if INR out of range. They have been instructed to call if any changes in medications, doses, concerns, etc. Patient expresses understanding and has no further questions at this time.    Mathieu Moser [facility]

## 2020-12-16 ENCOUNTER — ANTICOAGULATION VISIT (OUTPATIENT)
Dept: PHARMACY | Facility: HOSPITAL | Age: 85
End: 2020-12-16

## 2020-12-16 DIAGNOSIS — I63.411 CEREBROVASCULAR ACCIDENT (CVA) DUE TO EMBOLISM OF RIGHT MIDDLE CEREBRAL ARTERY (HCC): ICD-10-CM

## 2020-12-16 DIAGNOSIS — Z95.2 HX OF AORTIC VALVE REPLACEMENT, MECHANICAL: ICD-10-CM

## 2020-12-16 LAB — INR PPP: 2.7

## 2020-12-16 NOTE — PROGRESS NOTES
Anticoagulation Clinic Progress Note    Anticoagulation Summary  As of 2020    INR goal:  3.0-3.5   TTR:  55.9 % (2.2 y)   INR used for dosin.70 (2020)   Warfarin maintenance plan:  7.5 mg every Mon, Wed, Fri; 5 mg all other days   Weekly warfarin total:  42.5 mg   Plan last modified:  Vasquez Niño RPH (2020)   Next INR check:  2020   Priority:  Maintenance   Target end date:  Indefinite    Indications    Hx of aortic valve replacement  mechanical [Z95.2] [Z95.2]  Atrial fibrillation (CMS/HCC) [I48.91]  Cerebrovascular accident (CVA) due to embolism of right middle cerebral artery (CMS/HCC) [I63.411]             Anticoagulation Episode Summary     INR check location:      Preferred lab:      [facility]    Comments:  New INR goal 3 - 3.5 (see 2020 hospitalization for CVA)      Anticoagulation Care Providers     Provider Role Specialty Phone number    Griffin Fried MD Referring Cardiology [phone]          Clinic Interview:  Patient Findings     Negatives:  Signs/symptoms of thrombosis, Signs/symptoms of bleeding,   Laboratory test error suspected, Change in health, Change in alcohol use,   Change in activity, Upcoming invasive procedure, Emergency department   visit, Upcoming dental procedure, Missed doses, Extra doses, Change in   medications, Change in diet/appetite, Hospital admission, Bruising, Other   complaints      Clinical Outcomes     Negatives:  Major bleeding event, Thromboembolic event,   Anticoagulation-related hospital admission, Anticoagulation-related ED   visit, Anticoagulation-related fatality        INR History:  Anticoagulation Monitoring 2020   INR 3.60 2.40 2.70   INR Date 2020   INR Goal 3.0-3.5 3.0-3.5 3.0-3.5   Trend Same Same Up   Last Week Total 42.5 mg 40 mg 42.5 mg   Next Week Total 40 mg 42.5 mg 42.5 mg   Sun 5 mg 5 mg 5 mg   Mon 7.5 mg 7.5 mg 7.5 mg      Tue 5 mg 5 mg 5 mg   Wed 5 mg 7.5 mg (12/9) 7.5 mg   Thu 7.5 mg 7.5 mg 5 mg   Fri 5 mg 5 mg 7.5 mg   Sat 5 mg 5 mg 5 mg   Visit Report - - -   Some recent data might be hidden       Plan:  1. INR is Subtherapeutic today- see above in Anticoagulation Summary.   Will instruct Francisco Sequeira to Increase their warfarin regimen- see above in Anticoagulation Summary.  2. Follow up in 1 week  3. They have been instructed to call if any changes in medications, doses, concerns, etc. Patient expresses understanding and has no further questions at this time.    Vasquez Niño [facility]

## 2020-12-17 ENCOUNTER — OFFICE VISIT (OUTPATIENT)
Dept: FAMILY MEDICINE CLINIC | Facility: CLINIC | Age: 85
End: 2020-12-17

## 2020-12-17 VITALS
HEART RATE: 57 BPM | BODY MASS INDEX: 24.43 KG/M2 | WEIGHT: 180.4 LBS | TEMPERATURE: 96.8 F | HEIGHT: 72 IN | DIASTOLIC BLOOD PRESSURE: 58 MMHG | SYSTOLIC BLOOD PRESSURE: 122 MMHG | OXYGEN SATURATION: 94 %

## 2020-12-17 DIAGNOSIS — Z00.00 MEDICARE ANNUAL WELLNESS VISIT, SUBSEQUENT: Primary | ICD-10-CM

## 2020-12-17 PROCEDURE — G0439 PPPS, SUBSEQ VISIT: HCPCS | Performed by: NURSE PRACTITIONER

## 2020-12-17 NOTE — PROGRESS NOTES
The ABCs of the Annual Wellness Visit  Subsequent Medicare Wellness Visit    Chief Complaint   Patient presents with   • Medicare Wellness-subsequent     Patient is not fasting ( wore mask and goggles)        Subjective   History of Present Illness:  Francisco Sequeira is a 85 y.o. male who presents for a Subsequent Medicare Wellness Visit.    HEALTH RISK ASSESSMENT    Recent Hospitalizations:  No hospitalization(s) within the last year.    Current Medical Providers:  Patient Care Team:  Rubin Hinkle APRN as PCP - General (Family Medicine)  Audra Vazquez RPH as Pharmacist  Vasquez Niño RPH as Pharmacist (Pharmacy)    Smoking Status:  Social History     Tobacco Use   Smoking Status Former Smoker   Smokeless Tobacco Former User   Tobacco Comment    caffeine use       Alcohol Consumption:  Social History     Substance and Sexual Activity   Alcohol Use Yes    Comment: occasional       Depression Screen:   PHQ-2/PHQ-9 Depression Screening 12/17/2020   Little interest or pleasure in doing things 0   Feeling down, depressed, or hopeless 0   Total Score 0       Fall Risk Screen:  DELIA Fall Risk Assessment has not been completed.    Health Habits and Functional and Cognitive Screening:  Functional & Cognitive Status 12/17/2020   Do you have difficulty preparing food and eating? No   Do you have difficulty bathing yourself, getting dressed or grooming yourself? No   Do you have difficulty using the toilet? No   Do you have difficulty moving around from place to place? No   Do you have trouble with steps or getting out of a bed or a chair? No   Current Diet Low Carb Diet   Dental Exam Up to date   Eye Exam Up to date   Exercise (times per week) 0 times per week   Current Exercise Activities Include None   Do you need help using the phone?  No   Are you deaf or do you have serious difficulty hearing?  No   Do you need help with transportation? No   Do you need help shopping? No   Do you need help preparing meals?  No   Do  you need help with housework?  No   Do you need help with laundry? No   Do you need help taking your medications? No   Do you need help managing money? No   Do you ever drive or ride in a car without wearing a seat belt? No   Have you felt unusual stress, anger or loneliness in the last month? -   Who do you live with? -   If you need help, do you have trouble finding someone available to you? -   Have you been bothered in the last four weeks by sexual problems? -   Do you have difficulty concentrating, remembering or making decisions? -         Does the patient have evidence of cognitive impairment? No    Asprin use counseling:Taking ASA appropriately as indicated    Age-appropriate Screening Schedule:  Refer to the list below for future screening recommendations based on patient's age, sex and/or medical conditions. Orders for these recommended tests are listed in the plan section. The patient has been provided with a written plan.    Health Maintenance   Topic Date Due   • TDAP/TD VACCINES (1 - Tdap) 11/22/1954   • ZOSTER VACCINE (2 of 2) 02/26/2015   • DIABETIC EYE EXAM  10/01/2017   • INFLUENZA VACCINE  08/01/2020   • LIPID PANEL  01/14/2021   • HEMOGLOBIN A1C  02/28/2021   • URINE MICROALBUMIN  08/28/2021          The following portions of the patient's history were reviewed and updated as appropriate: allergies, current medications, past family history, past medical history, past social history, past surgical history and problem list.    Outpatient Medications Prior to Visit   Medication Sig Dispense Refill   • ACCU-CHEK FASTCLIX LANCETS misc TEST BLOOD SUGAR 3 TO 4 TIMES A  each 3   • aspirin 81 MG tablet Take 1 tablet by mouth Daily. (Patient taking differently: Take 325 mg by mouth Daily.) 90 tablet 3   • atorvastatin (LIPITOR) 40 MG tablet Take 1 tablet by mouth Daily for 360 days. 90 tablet 3   • Blood Glucose Monitoring Suppl (ACCU-CHEK IFEANYI) device Test blood sugar tid 1 each 0   • digoxin  (LANOXIN) 125 MCG tablet TAKE ONE TABLET BY MOUTH DAILY 90 tablet 0   • diphenhydrAMINE (BENADRYL) 25 MG tablet Take 100 mg by mouth 2 (Two) Times a Day.     • furosemide (LASIX) 20 MG tablet Take 1 tablet by mouth Every Other Day. 45 tablet 1   • glucose blood (Accu-Chek Darlene Plus) test strip TEST BLOOD SUGAR 3 TO 4 TIMES DAILY 100 each 3   • glucose blood test strip Accu-Chek Darlene Plus In Vitro Strip; Patient Sig: Accu-Chek Darlene Plus In Vitro Strip CHECK BLOOD SUGAR THREE TIMES A DAY TO FOUR TIMES A DAY; 100; 5; 14-Sep-2015; Active     • guaiFENesin (MUCINEX) 600 MG 12 hr tablet Take  by mouth every 12 (twelve) hours.     • Insulin Disposable Pump (V-Go 40) kit USE AS DIRECTED THREE TIMES A DAY 30 each 2   • insulin lispro (humaLOG) 100 UNIT/ML injection USE INSULIN AS DIRECTED IN V-GO PUMP 30 mL 10   • Insulin Pen Needle (PEN NEEDLES) 32G X 4 MM misc USE AS DIRECTED 200 each 5   • magnesium oxide (MAG-OX) 400 MG tablet Take 400 mg by mouth 2 (Two) Times a Day.     • metoprolol succinate XL (TOPROL-XL) 25 MG 24 hr tablet TAKE ONE TABLET BY MOUTH DAILY 90 tablet 2   • Omega-3 Fatty Acids (FISH OIL) 1000 MG capsule capsule Take 4,000 mg by mouth Daily With Breakfast.     • SAFETY LANCETS 21G misc Test blood sugar twice a day 200 each 3   • Tresiba FlexTouch 100 UNIT/ML solution pen-injector injection INJECT 65 UNITS UNDER THE SKIN INTO THE APPROPRIATE AREA AS DIRECTED DAILY 15 pen 3   • warfarin (COUMADIN) 5 MG tablet Take one and one-half tablets (7.5mg) by mouth on Mon/Thurs/Sat and take one tablet (5mg) on all other days or as directed 109 tablet 1   • clobetasol (TEMOVATE) 0.05 % external solution Apply  topically to the appropriate area as directed 2 (Two) Times a Day. 1 each 2     No facility-administered medications prior to visit.        Patient Active Problem List   Diagnosis   • Atrial fibrillation (CMS/HCC)   • Hypertension   • Atopic rhinitis   • Uncontrolled type 2 diabetes mellitus (CMS/HCC)   •  "Gastroesophageal reflux disease   • Hyperlipidemia   • Uncontrolled type 2 diabetes mellitus with complication, with long-term current use of insulin (CMS/[facility])   • Renal insufficiency   • Low testosterone   • Medicare annual wellness visit, subsequent   • Hx of aortic valve replacement, mechanical [Z95.2]   • Stroke-like symptom   • Kidney carcinoma (CMS/[facility])   • CKD (chronic kidney disease) stage 3, GFR 30-59 ml/min   • BYRON (acute kidney injury) (CMS/[facility])   • Acute cerebral infarction (CMS/[facility])   • Cerebrovascular accident (CVA) due to embolism of right middle cerebral artery (CMS/[facility])       Advanced Care Planning:  ACP discussion was held with the patient during this visit. Patient has an advance directive (not in EMR), copy requested.    Review of Systems    Compared to one year ago, the patient feels his physical health is the same.  Compared to one year ago, the patient feels his mental health is the same.    Reviewed chart for potential of high risk medication in the elderly: yes  Reviewed chart for potential of harmful drug interactions in the elderly:yes    Objective         Vitals:    12/17/20 1258   BP: 122/58   Pulse: 57   Temp: 96.8 °F (36 °C)   SpO2: 94%   Weight: 81.8 kg (180 lb 6.4 oz)   Height: 182.9 cm (72.01\")   PainSc: 0-No pain       Body mass index is 24.46 kg/m².  Discussed the patient's BMI with him. The BMI is in the acceptable range.    Physical Exam          Assessment/Plan   Medicare Risks and Personalized Health Plan  CMS Preventative Services Quick Reference  Advance Directive Discussion  Cardiovascular risk  Fall Risk  Immunizations Discussed/Encouraged (specific immunizations; Pneumococcal 23, Prevnar and Shingrix )  Inactivity/Sedentary    The above risks/problems have been discussed with the patient.  Pertinent information has been shared with the patient in the After Visit Summary.  Follow up plans and orders are seen below in the Assessment/Plan Section.    Diagnoses and all orders " for this visit:    1. Medicare annual wellness visit, subsequent (Primary)      Follow Up:  Return in about 3 months (around 3/17/2021) for Recheck.     An After Visit Summary and PPPS were given to the patient.

## 2020-12-23 ENCOUNTER — ANTICOAGULATION VISIT (OUTPATIENT)
Dept: PHARMACY | Facility: HOSPITAL | Age: 85
End: 2020-12-23

## 2020-12-23 DIAGNOSIS — Z95.2 HX OF AORTIC VALVE REPLACEMENT, MECHANICAL: ICD-10-CM

## 2020-12-23 DIAGNOSIS — I63.411 CEREBROVASCULAR ACCIDENT (CVA) DUE TO EMBOLISM OF RIGHT MIDDLE CEREBRAL ARTERY (HCC): ICD-10-CM

## 2020-12-23 LAB — INR PPP: 3.3

## 2020-12-23 NOTE — PROGRESS NOTES
Anticoagulation Clinic Progress Note    Anticoagulation Summary  As of 12/23/2020    INR goal:  3.0-3.5   TTR:  55.8 % (2.2 y)   INR used for dosing:  3.30 (12/23/2020)   Warfarin maintenance plan:  7.5 mg every Mon, Wed, Fri; 5 mg all other days   Weekly warfarin total:  42.5 mg   Plan last modified:  Vasquez Niño RPH (12/16/2020)   Next INR check:  12/30/2020   Priority:  Maintenance   Target end date:  Indefinite    Indications    Hx of aortic valve replacement  mechanical [Z95.2] [Z95.2]  Atrial fibrillation (CMS/HCC) [I48.91]  Cerebrovascular accident (CVA) due to embolism of right middle cerebral artery (CMS/HCC) [I63.411]             Anticoagulation Episode Summary     INR check location:      Preferred lab:      Send INR reminders to:   [facility]    Comments:  New INR goal 3 - 3.5 (see 1/2020 hospitalization for CVA)      Anticoagulation Care Providers     Provider Role Specialty Phone number    Griffin Fried MD Referring Cardiology [phone]          Clinic Interview:      INR History:  Anticoagulation Monitoring 12/9/2020 12/16/2020 12/23/2020   INR 2.40 2.70 3.30   INR Date 12/9/2020 12/16/2020 12/23/2020   INR Goal 3.0-3.5 3.0-3.5 3.0-3.5   Trend Same Up Same   Last Week Total 40 mg 42.5 mg 42.5 mg   Next Week Total 42.5 mg 42.5 mg 42.5 mg   Sun 5 mg 5 mg 5 mg   Mon 7.5 mg 7.5 mg 7.5 mg   Tue 5 mg 5 mg 5 mg   Wed 7.5 mg (12/9) 7.5 mg 7.5 mg   Thu 7.5 mg 5 mg 5 mg   Fri 5 mg 7.5 mg 7.5 mg   Sat 5 mg 5 mg 5 mg   Visit Report - - -   Some recent data might be hidden       Plan:  1. INR is Therapeutic today- see above in Anticoagulation Summary.   Will instruct Francisco Sequeira to Continue their warfarin regimen- see above in Anticoagulation Summary.  2. Follow up in 1 week  3. Spoke with spouse Gladys today. They have been instructed to call if any changes in medications, doses, concerns, etc. She expresses understanding and has no further questions at this time.    Audra Vazquez,  RPH

## 2020-12-28 RX ORDER — DIGOXIN 125 MCG
TABLET ORAL
Qty: 90 TABLET | Refills: 3 | Status: ON HOLD | OUTPATIENT
Start: 2020-12-28 | End: 2021-11-04

## 2020-12-30 ENCOUNTER — ANTICOAGULATION VISIT (OUTPATIENT)
Dept: PHARMACY | Facility: HOSPITAL | Age: 85
End: 2020-12-30

## 2020-12-30 DIAGNOSIS — Z95.2 HX OF AORTIC VALVE REPLACEMENT, MECHANICAL: ICD-10-CM

## 2020-12-30 DIAGNOSIS — I63.411 CEREBROVASCULAR ACCIDENT (CVA) DUE TO EMBOLISM OF RIGHT MIDDLE CEREBRAL ARTERY (HCC): ICD-10-CM

## 2020-12-30 LAB — INR PPP: 3.3

## 2020-12-30 NOTE — PROGRESS NOTES
Anticoagulation Clinic Progress Note    Anticoagulation Summary  As of 12/30/2020    INR goal:  3.0-3.5   TTR:  56.2 % (2.2 y)   INR used for dosing:  3.30 (12/30/2020)   Warfarin maintenance plan:  7.5 mg every Mon, Wed, Fri; 5 mg all other days   Weekly warfarin total:  42.5 mg   No change documented:  Natty Sullivan   Plan last modified:  Vasquez Niño RPH (12/16/2020)   Next INR check:  1/6/2021   Priority:  Maintenance   Target end date:  Indefinite    Indications    Hx of aortic valve replacement  mechanical [Z95.2] [Z95.2]  Atrial fibrillation (CMS/HCC) [I48.91]  Cerebrovascular accident (CVA) due to embolism of right middle cerebral artery (CMS/HCC) [I63.411]             Anticoagulation Episode Summary     INR check location:      Preferred lab:      Send INR reminders to:  [facility] POOL    Comments:  New INR goal 3 - 3.5 (see 1/2020 hospitalization for CVA)      Anticoagulation Care Providers     Provider Role Specialty Phone number    Griffin Fried MD Referring Cardiology [phone]          Clinic Interview:  No pertinent clinical findings have been reported.    INR History:  Anticoagulation Monitoring 12/16/2020 12/23/2020 12/30/2020   INR 2.70 3.30 3.30   INR Date 12/16/2020 12/23/2020 12/30/2020   INR Goal 3.0-3.5 3.0-3.5 3.0-3.5   Trend Up Same Same   Last Week Total 42.5 mg 42.5 mg 42.5 mg   Next Week Total 42.5 mg 42.5 mg 42.5 mg   Sun 5 mg 5 mg 5 mg   Mon 7.5 mg 7.5 mg 7.5 mg   Tue 5 mg 5 mg 5 mg   Wed 7.5 mg 7.5 mg 7.5 mg   Thu 5 mg 5 mg 5 mg   Fri 7.5 mg 7.5 mg 7.5 mg   Sat 5 mg 5 mg 5 mg   Visit Report - - -   Some recent data might be hidden       Plan:  1. INR is therapeutic today- see above in Anticoagulation Summary.    Francisco Sequeira to continue their warfarin regimen- see above in Anticoagulation Summary.  2. Follow up in 1 week  3. Pt has agreed to only be called if INR out of range. They have been instructed to call if any changes in medications,  doses, concerns, etc. Patient expresses understanding and has no further questions at this time.    Natty Sullivan

## 2021-01-06 ENCOUNTER — ANTICOAGULATION VISIT (OUTPATIENT)
Dept: PHARMACY | Facility: HOSPITAL | Age: 86
End: 2021-01-06

## 2021-01-06 DIAGNOSIS — I63.411 CEREBROVASCULAR ACCIDENT (CVA) DUE TO EMBOLISM OF RIGHT MIDDLE CEREBRAL ARTERY (HCC): ICD-10-CM

## 2021-01-06 DIAGNOSIS — Z95.2 HX OF AORTIC VALVE REPLACEMENT, MECHANICAL: ICD-10-CM

## 2021-01-06 LAB — INR PPP: 2.5

## 2021-01-06 NOTE — PROGRESS NOTES
Anticoagulation Clinic Progress Note    Anticoagulation Summary  As of 2021    INR goal:  3.0-3.5   TTR:  56.0 % (2.2 y)   INR used for dosin.50 (2021)   Warfarin maintenance plan:  7.5 mg every Mon, Wed, Fri; 5 mg all other days   Weekly warfarin total:  42.5 mg   No change documented:  Vasquez Niño RPH   Plan last modified:  Vasquez Niño RPH (2020)   Next INR check:  2021   Priority:  Maintenance   Target end date:  Indefinite    Indications    Hx of aortic valve replacement  mechanical [Z95.2] [Z95.2]  Atrial fibrillation (CMS/HCC) [I48.91]  Cerebrovascular accident (CVA) due to embolism of right middle cerebral artery (CMS/HCC) [I63.411]             Anticoagulation Episode Summary     INR check location:      Preferred lab:      Send INR reminders to:  [facility] PO[facility]    Comments:  New INR goal 3 - 3.5 (see 2020 hospitalization for CVA)      Anticoagulation Care Providers     Provider Role Specialty Phone number    Griffin Fried MD Referring Cardiology [phone]          Clinic Interview:  Patient Findings     Positives:  Change in diet/appetite    Negatives:  Signs/symptoms of thrombosis, Signs/symptoms of bleeding,   Laboratory test error suspected, Change in health, Change in alcohol use,   Change in activity, Upcoming invasive procedure, Emergency department   visit, Upcoming dental procedure, Missed doses, Extra doses, Change in   medications, Hospital admission, Bruising, Other complaints    Comments:  Reports cabbage 2x in past wk (more vit k than usual).       Clinical Outcomes     Negatives:  Major bleeding event, Thromboembolic event,   Anticoagulation-related hospital admission, Anticoagulation-related ED   visit, Anticoagulation-related fatality    Comments:  Reports cabbage 2x in past wk (more vit k than usual).         INR History:  Anticoagulation Monitoring 2020   INR 3.30 3.30 2.50   INR Date 2020  1/6/2021   INR Goal 3.0-3.5 3.0-3.5 3.0-3.5   Trend Same Same Same   Last Week Total 42.5 mg 42.5 mg 42.5 mg   Next Week Total 42.5 mg 42.5 mg 42.5 mg   Sun 5 mg 5 mg 5 mg   Mon 7.5 mg 7.5 mg 7.5 mg   Tue 5 mg 5 mg 5 mg   Wed 7.5 mg 7.5 mg 7.5 mg   Thu 5 mg 5 mg 5 mg   Fri 7.5 mg 7.5 mg 7.5 mg   Sat 5 mg 5 mg 5 mg   Visit Report - - -   Some recent data might be hidden       Plan:  1. INR is Subtherapeutic today- see above in Anticoagulation Summary.   Will instruct Francisco Sequeira to Continue their warfarin regimen (due for higher 7.5 mg dose today; historically responds quickly to very small changes in dose, so want to avoid a supratherapeutic INR that might be caused by giving extra warfarin today)- see above in Anticoagulation Summary.  2. Follow up in 1 week  3. They have been instructed to call if any changes in medications, doses, concerns, etc. Patient expresses understanding and has no further questions at this time.    Vasquez Niño [facility]

## 2021-01-07 RX ORDER — METOPROLOL SUCCINATE 25 MG/1
TABLET, EXTENDED RELEASE ORAL
Qty: 90 TABLET | Refills: 1 | Status: SHIPPED | OUTPATIENT
Start: 2021-01-07 | End: 2021-07-06

## 2021-01-13 ENCOUNTER — ANTICOAGULATION VISIT (OUTPATIENT)
Dept: PHARMACY | Facility: HOSPITAL | Age: 86
End: 2021-01-13

## 2021-01-13 DIAGNOSIS — Z95.2 HX OF AORTIC VALVE REPLACEMENT, MECHANICAL: ICD-10-CM

## 2021-01-13 DIAGNOSIS — I63.411 CEREBROVASCULAR ACCIDENT (CVA) DUE TO EMBOLISM OF RIGHT MIDDLE CEREBRAL ARTERY (HCC): ICD-10-CM

## 2021-01-13 LAB — INR PPP: 2.8

## 2021-01-13 NOTE — PROGRESS NOTES
Anticoagulation Clinic Progress Note    Anticoagulation Summary  As of 2021    INR goal:  3.0-3.5   TTR:  55.6 % (2.3 y)   INR used for dosin.80 (2021)   Warfarin maintenance plan:  7.5 mg every Mon, Wed, Fri; 5 mg all other days   Weekly warfarin total:  42.5 mg   Plan last modified:  Vasquez Niño RPH (2020)   Next INR check:  2021   Priority:  Maintenance   Target end date:  Indefinite    Indications    Hx of aortic valve replacement  mechanical [Z95.2] [Z95.2]  Atrial fibrillation (CMS/HCC) [I48.91]  Cerebrovascular accident (CVA) due to embolism of right middle cerebral artery (CMS/HCC) [I63.411]             Anticoagulation Episode Summary     INR check location:      Preferred lab:      Send INR reminders to:   [facility]    Comments:  New INR goal 3 - 3.5 (see 2020 hospitalization for CVA)      Anticoagulation Care Providers     Provider Role Specialty Phone number    Griffin Fried MD Referring Cardiology [phone]          Clinic Interview:  Patient Findings     Positives:  Change in diet/appetite    Negatives:  Signs/symptoms of thrombosis, Signs/symptoms of bleeding,   Laboratory test error suspected, Change in health, Change in alcohol use,   Change in activity, Upcoming invasive procedure, Emergency department   visit, Upcoming dental procedure, Missed doses, Extra doses, Change in   medications, Hospital admission, Bruising, Other complaints    Comments:  A little more greens than usual but the same from last week      Clinical Outcomes     Negatives:  Major bleeding event, Thromboembolic event,   Anticoagulation-related hospital admission, Anticoagulation-related ED   visit, Anticoagulation-related fatality    Comments:  A little more greens than usual but the same from last week        INR History:  Anticoagulation Monitoring 2020   INR 3.30 2.50 2.80   INR Date 2020   INR Goal 3.0-3.5 3.0-3.5  3.0-3.5   Trend Same Same Same   Last Week Total 42.5 mg 42.5 mg 42.5 mg   Next Week Total 42.5 mg 42.5 mg 45 mg   Sun 5 mg 5 mg 5 mg   Mon 7.5 mg 7.5 mg 7.5 mg   Tue 5 mg 5 mg 5 mg   Wed 7.5 mg 7.5 mg 10 mg (1/13)   Thu 5 mg 5 mg 5 mg   Fri 7.5 mg 7.5 mg 7.5 mg   Sat 5 mg 5 mg 5 mg   Visit Report - - -   Some recent data might be hidden       Plan:  1. INR is Subtherapeutic today- see above in Anticoagulation Summary.   Will instruct Francisco Sequeira to Change their warfarin regimen- see above in Anticoagulation Summary.  2. Follow up in 1 week  3. Pt has agreed to only be called if INR out of range. They have been instructed to call if any changes in medications, doses, concerns, etc. Patient expresses understanding and has no further questions at this time.    Christina Johnson, [facility]

## 2021-01-20 ENCOUNTER — ANTICOAGULATION VISIT (OUTPATIENT)
Dept: PHARMACY | Facility: HOSPITAL | Age: 86
End: 2021-01-20

## 2021-01-20 DIAGNOSIS — I63.411 CEREBROVASCULAR ACCIDENT (CVA) DUE TO EMBOLISM OF RIGHT MIDDLE CEREBRAL ARTERY (HCC): ICD-10-CM

## 2021-01-20 DIAGNOSIS — Z95.2 HX OF AORTIC VALVE REPLACEMENT, MECHANICAL: ICD-10-CM

## 2021-01-20 LAB — INR PPP: 3.1

## 2021-01-20 RX ORDER — INSULIN DEGLUDEC INJECTION 100 U/ML
INJECTION, SOLUTION SUBCUTANEOUS
Qty: 8 PEN | Refills: 2 | Status: SHIPPED | OUTPATIENT
Start: 2021-01-20 | End: 2021-04-08

## 2021-01-20 NOTE — PROGRESS NOTES
Anticoagulation Clinic Progress Note    Anticoagulation Summary  As of 1/20/2021    INR goal:  3.0-3.5   TTR:  55.4 % (2.3 y)   INR used for dosing:  3.10 (1/20/2021)   Warfarin maintenance plan:  7.5 mg every Mon, Wed, Fri; 5 mg all other days   Weekly warfarin total:  42.5 mg   No change documented:  Vasquez Niño RPH   Plan last modified:  Vasquez Niño RPH (12/16/2020)   Next INR check:  1/27/2021   Priority:  Maintenance   Target end date:  Indefinite    Indications    Hx of aortic valve replacement  mechanical [Z95.2] [Z95.2]  Atrial fibrillation (CMS/HCC) [I48.91]  Cerebrovascular accident (CVA) due to embolism of right middle cerebral artery (CMS/HCC) [I63.411]             Anticoagulation Episode Summary     INR check location:      Preferred lab:      Send INR reminders to:  [facility]    Comments:  New INR goal 3 - 3.5 (see 1/2020 hospitalization for CVA)      Anticoagulation Care Providers     Provider Role Specialty Phone number    Griffin Fried MD Referring Cardiology [phone]          Clinic Interview:  Patient Findings     Negatives:  Signs/symptoms of thrombosis, Signs/symptoms of bleeding,   Laboratory test error suspected, Change in health, Change in alcohol use,   Change in activity, Upcoming invasive procedure, Emergency department   visit, Upcoming dental procedure, Missed doses, Extra doses, Change in   medications, Change in diet/appetite, Hospital admission, Bruising, Other   complaints      Clinical Outcomes     Negatives:  Major bleeding event, Thromboembolic event,   Anticoagulation-related hospital admission, Anticoagulation-related ED   visit, Anticoagulation-related fatality        INR History:  Anticoagulation Monitoring 1/6/2021 1/13/2021 1/20/2021   INR 2.50 2.80 3.10   INR Date 1/6/2021 1/13/2021 1/20/2021   INR Goal 3.0-3.5 3.0-3.5 3.0-3.5   Trend Same Same Same   Last Week Total 42.5 mg 42.5 mg 45 mg   Next Week Total 42.5 mg 45 mg 42.5 mg   Sun 5 mg 5  mg 5 mg   Mon 7.5 mg 7.5 mg 7.5 mg   Tue 5 mg 5 mg 5 mg   Wed 7.5 mg 10 mg (1/13) 7.5 mg   Thu 5 mg 5 mg 5 mg   Fri 7.5 mg 7.5 mg 7.5 mg   Sat 5 mg 5 mg 5 mg   Visit Report - - -   Some recent data might be hidden       Plan:  1. INR is Therapeutic today- see above in Anticoagulation Summary.   Will instruct Francisco Sequeira to Continue their warfarin regimen- see above in Anticoagulation Summary.  2. Follow up in 1 week  3.They have been instructed to call if any changes in medications, doses, concerns, etc. Patient expresses understanding and has no further questions at this time.    Vasquez Niño [facility]

## 2021-01-27 ENCOUNTER — ANTICOAGULATION VISIT (OUTPATIENT)
Dept: PHARMACY | Facility: HOSPITAL | Age: 86
End: 2021-01-27

## 2021-01-27 DIAGNOSIS — I63.411 CEREBROVASCULAR ACCIDENT (CVA) DUE TO EMBOLISM OF RIGHT MIDDLE CEREBRAL ARTERY (HCC): ICD-10-CM

## 2021-01-27 DIAGNOSIS — Z95.2 HX OF AORTIC VALVE REPLACEMENT, MECHANICAL: ICD-10-CM

## 2021-01-27 LAB — INR PPP: 3.3

## 2021-01-27 NOTE — PROGRESS NOTES
Anticoagulation Clinic Progress Note    Anticoagulation Summary  As of 1/27/2021    INR goal:  3.0-3.5   TTR:  55.7 % (2.3 y)   INR used for dosing:  3.30 (1/27/2021)   Warfarin maintenance plan:  7.5 mg every Mon, Wed, Fri; 5 mg all other days   Weekly warfarin total:  42.5 mg   No change documented:  Princess Robin   Plan last modified:  Vasquez Niño RPH (12/16/2020)   Next INR check:  2/3/2021   Priority:  Maintenance   Target end date:  Indefinite    Indications    Hx of aortic valve replacement  mechanical [Z95.2] [Z95.2]  Atrial fibrillation (CMS/HCC) [I48.91]  Cerebrovascular accident (CVA) due to embolism of right middle cerebral artery (CMS/HCC) [I63.411]             Anticoagulation Episode Summary     INR check location:      Preferred lab:      Send INR reminders to:  [facility]    Comments:  New INR goal 3 - 3.5 (see 1/2020 hospitalization for CVA)      Anticoagulation Care Providers     Provider Role Specialty Phone number    Griffin Fried MD Referring Cardiology [phone]          Clinic Interview:  No pertinent clinical findings have been reported.    INR History:  Anticoagulation Monitoring 1/13/2021 1/20/2021 1/27/2021   INR 2.80 3.10 3.30   INR Date 1/13/2021 1/20/2021 1/27/2021   INR Goal 3.0-3.5 3.0-3.5 3.0-3.5   Trend Same Same Same   Last Week Total 42.5 mg 45 mg 42.5 mg   Next Week Total 45 mg 42.5 mg 42.5 mg   Sun 5 mg 5 mg 5 mg   Mon 7.5 mg 7.5 mg 7.5 mg   Tue 5 mg 5 mg 5 mg   Wed 10 mg (1/13) 7.5 mg 7.5 mg   Thu 5 mg 5 mg 5 mg   Fri 7.5 mg 7.5 mg 7.5 mg   Sat 5 mg 5 mg 5 mg   Visit Report - - -   Some recent data might be hidden       Plan:  1. INR is therapeutic today- see above in Anticoagulation Summary.    Francisco Sequeira to continue their warfarin regimen- see above in Anticoagulation Summary.  2. Follow up in 1 weeks  3. Pt has agreed to only be called if INR out of range. They have been instructed to call if any changes in medications, doses, concerns,  etc. Patient expresses understanding and has no further questions at this time.    Princess Robin

## 2021-02-03 ENCOUNTER — ANTICOAGULATION VISIT (OUTPATIENT)
Dept: PHARMACY | Facility: HOSPITAL | Age: 86
End: 2021-02-03

## 2021-02-03 DIAGNOSIS — I63.411 CEREBROVASCULAR ACCIDENT (CVA) DUE TO EMBOLISM OF RIGHT MIDDLE CEREBRAL ARTERY (HCC): ICD-10-CM

## 2021-02-03 DIAGNOSIS — Z95.2 HX OF AORTIC VALVE REPLACEMENT, MECHANICAL: ICD-10-CM

## 2021-02-03 LAB — INR PPP: 3.5

## 2021-02-03 NOTE — PROGRESS NOTES
Anticoagulation Clinic Progress Note    Anticoagulation Summary  As of 2/3/2021    INR goal:  3.0-3.5   TTR:  56.1 % (2.3 y)   INR used for dosing:  3.50 (2/3/2021)   Warfarin maintenance plan:  7.5 mg every Mon, Wed, Fri; 5 mg all other days   Weekly warfarin total:  42.5 mg   No change documented:  Angie Sykes   Plan last modified:  Vasquez Niño RPH (12/16/2020)   Next INR check:  2/10/2021   Priority:  Maintenance   Target end date:  Indefinite    Indications    Hx of aortic valve replacement  mechanical [Z95.2] [Z95.2]  Atrial fibrillation (CMS/HCC) [I48.91]  Cerebrovascular accident (CVA) due to embolism of right middle cerebral artery (CMS/HCC) [I63.411]             Anticoagulation Episode Summary     INR check location:      Preferred lab:      Send INR reminders to:   [facility]  [facility]OL    Comments:  New INR goal 3 - 3.5 (see 1/2020 hospitalization for CVA)      Anticoagulation Care Providers     Provider Role Specialty Phone number    Griffin Fried MD Referring Cardiology [phone]          Clinic Interview:  No pertinent clinical findings have been reported.    INR History:  Anticoagulation Monitoring 1/20/2021 1/27/2021 2/3/2021   INR 3.10 3.30 3.50   INR Date 1/20/2021 1/27/2021 2/3/2021   INR Goal 3.0-3.5 3.0-3.5 3.0-3.5   Trend Same Same Same   Last Week Total 45 mg 42.5 mg 42.5 mg   Next Week Total 42.5 mg 42.5 mg 42.5 mg   Sun 5 mg 5 mg 5 mg   Mon 7.5 mg 7.5 mg 7.5 mg   Tue 5 mg 5 mg 5 mg   Wed 7.5 mg 7.5 mg 7.5 mg   Thu 5 mg 5 mg 5 mg   Fri 7.5 mg 7.5 mg 7.5 mg   Sat 5 mg 5 mg 5 mg   Visit Report - - -   Some recent data might be hidden       Plan:  1. INR is therapeutic today- see above in Anticoagulation Summary.    Francisco Sequeira to continue their warfarin regimen- see above in Anticoagulation Summary.  2. Follow up in 1 week  3. Pt has agreed to only be called if INR out of range. They have been instructed to call if any changes in medications, doses, concerns, etc.  Patient expresses understanding and has no further questions at this time.    Angie Sykes

## 2021-02-04 DIAGNOSIS — E78.5 HYPERLIPIDEMIA, UNSPECIFIED HYPERLIPIDEMIA TYPE: ICD-10-CM

## 2021-02-04 RX ORDER — ATORVASTATIN CALCIUM 40 MG/1
TABLET, FILM COATED ORAL
Qty: 30 TABLET | Refills: 0 | Status: SHIPPED | OUTPATIENT
Start: 2021-02-04 | End: 2021-02-05 | Stop reason: SDUPTHER

## 2021-02-05 DIAGNOSIS — E78.5 HYPERLIPIDEMIA, UNSPECIFIED HYPERLIPIDEMIA TYPE: ICD-10-CM

## 2021-02-05 RX ORDER — ATORVASTATIN CALCIUM 40 MG/1
40 TABLET, FILM COATED ORAL DAILY
Qty: 90 TABLET | Refills: 0 | Status: SHIPPED | OUTPATIENT
Start: 2021-02-05 | End: 2021-05-10

## 2021-02-10 ENCOUNTER — ANTICOAGULATION VISIT (OUTPATIENT)
Dept: PHARMACY | Facility: HOSPITAL | Age: 86
End: 2021-02-10

## 2021-02-10 DIAGNOSIS — I63.411 CEREBROVASCULAR ACCIDENT (CVA) DUE TO EMBOLISM OF RIGHT MIDDLE CEREBRAL ARTERY (HCC): ICD-10-CM

## 2021-02-10 DIAGNOSIS — Z95.2 HX OF AORTIC VALVE REPLACEMENT, MECHANICAL: ICD-10-CM

## 2021-02-10 LAB — INR PPP: 3

## 2021-02-10 NOTE — PROGRESS NOTES
Anticoagulation Clinic Progress Note    Anticoagulation Summary  As of 2/10/2021    INR goal:  3.0-3.5   TTR:  56.5 % (2.3 y)   INR used for dosing:  3.00 (2/10/2021)   Warfarin maintenance plan:  7.5 mg every Mon, Wed, Fri; 5 mg all other days   Weekly warfarin total:  42.5 mg   No change documented:  Natty Sullivan   Plan last modified:  Vasquez Niño RPH (12/16/2020)   Next INR check:  2/17/2021   Priority:  Maintenance   Target end date:  Indefinite    Indications    Hx of aortic valve replacement  mechanical [Z95.2] [Z95.2]  Atrial fibrillation (CMS/HCC) [I48.91]  Cerebrovascular accident (CVA) due to embolism of right middle cerebral artery (CMS/HCC) [I63.411]             Anticoagulation Episode Summary     INR check location:      Preferred lab:      Send INR reminders to:   [facility]  POOL    Comments:  New INR goal 3 - 3.5 (see 1/2020 hospitalization for CVA)      Anticoagulation Care Providers     Provider Role Specialty Phone number    Griffin Fried MD Referring Cardiology [phone]          Clinic Interview:  No pertinent clinical findings have been reported.    INR History:  Anticoagulation Monitoring 1/27/2021 2/3/2021 2/10/2021   INR 3.30 3.50 3.00   INR Date 1/27/2021 2/3/2021 2/10/2021   INR Goal 3.0-3.5 3.0-3.5 3.0-3.5   Trend Same Same Same   Last Week Total 42.5 mg 42.5 mg 42.5 mg   Next Week Total 42.5 mg 42.5 mg 42.5 mg   Sun 5 mg 5 mg 5 mg   Mon 7.5 mg 7.5 mg 7.5 mg   Tue 5 mg 5 mg 5 mg   Wed 7.5 mg 7.5 mg 7.5 mg   Thu 5 mg 5 mg 5 mg   Fri 7.5 mg 7.5 mg 7.5 mg   Sat 5 mg 5 mg 5 mg   Visit Report - - -   Some recent data might be hidden       Plan:  1. INR is therapeutic today- see above in Anticoagulation Summary.    Francisco Sequeira to continue their warfarin regimen- see above in Anticoagulation Summary.  2. Follow up in 1 week  3. Pt has agreed to only be called if INR out of range. They have been instructed to call if any changes in medications, doses,  concerns, etc. Patient expresses understanding and has no further questions at this time.    Natty Sullivan

## 2021-02-15 RX ORDER — FUROSEMIDE 20 MG/1
TABLET ORAL
Qty: 45 TABLET | Refills: 3 | Status: SHIPPED | OUTPATIENT
Start: 2021-02-15 | End: 2021-10-30 | Stop reason: HOSPADM

## 2021-02-16 DIAGNOSIS — IMO0002 UNCONTROLLED TYPE 2 DIABETES MELLITUS WITH COMPLICATION, WITH LONG-TERM CURRENT USE OF INSULIN: ICD-10-CM

## 2021-02-17 ENCOUNTER — ANTICOAGULATION VISIT (OUTPATIENT)
Dept: PHARMACY | Facility: HOSPITAL | Age: 86
End: 2021-02-17

## 2021-02-17 DIAGNOSIS — I63.411 CEREBROVASCULAR ACCIDENT (CVA) DUE TO EMBOLISM OF RIGHT MIDDLE CEREBRAL ARTERY (HCC): ICD-10-CM

## 2021-02-17 DIAGNOSIS — Z95.2 HX OF AORTIC VALVE REPLACEMENT, MECHANICAL: ICD-10-CM

## 2021-02-17 LAB — INR PPP: 3.2

## 2021-02-17 RX ORDER — SUB-Q INSULIN DEVICE, 40 UNIT
EACH MISCELLANEOUS
Qty: 30 EACH | Refills: 0 | Status: SHIPPED | OUTPATIENT
Start: 2021-02-17 | End: 2021-03-22

## 2021-02-17 NOTE — PROGRESS NOTES
Anticoagulation Clinic Progress Note    Anticoagulation Summary  As of 2/17/2021    INR goal:  3.0-3.5   TTR:  56.8 % (2.4 y)   INR used for dosing:  3.20 (2/17/2021)   Warfarin maintenance plan:  7.5 mg every Mon, Wed, Fri; 5 mg all other days   Weekly warfarin total:  42.5 mg   No change documented:  Princess Robin   Plan last modified:  Vasquez Niño RPH (12/16/2020)   Next INR check:  2/24/2021   Priority:  Maintenance   Target end date:  Indefinite    Indications    Hx of aortic valve replacement  mechanical [Z95.2] [Z95.2]  Atrial fibrillation (CMS/HCC) [I48.91]  Cerebrovascular accident (CVA) due to embolism of right middle cerebral artery (CMS/HCC) [I63.411]             Anticoagulation Episode Summary     INR check location:      Preferred lab:      Send INR reminders to:   [facility]  POOL    Comments:  New INR goal 3 - 3.5 (see 1/2020 hospitalization for CVA)      Anticoagulation Care Providers     Provider Role Specialty Phone number    Griffin Fried MD Referring Cardiology [phone]          Clinic Interview:  No pertinent clinical findings have been reported.    INR History:  Anticoagulation Monitoring 2/3/2021 2/10/2021 2/17/2021   INR 3.50 3.00 3.20   INR Date 2/3/2021 2/10/2021 2/17/2021   INR Goal 3.0-3.5 3.0-3.5 3.0-3.5   Trend Same Same Same   Last Week Total 42.5 mg 42.5 mg 42.5 mg   Next Week Total 42.5 mg 42.5 mg 42.5 mg   Sun 5 mg 5 mg 5 mg   Mon 7.5 mg 7.5 mg 7.5 mg   Tue 5 mg 5 mg 5 mg   Wed 7.5 mg 7.5 mg 7.5 mg   Thu 5 mg 5 mg 5 mg   Fri 7.5 mg 7.5 mg 7.5 mg   Sat 5 mg 5 mg 5 mg   Visit Report - - -   Some recent data might be hidden       Plan:  1. INR is therapeutic today- see above in Anticoagulation Summary.    Francisco Sequeira to continue their warfarin regimen- see above in Anticoagulation Summary.  2. Follow up in 1 wk  3. Pt has agreed to only be called if INR out of range. They have been instructed to call if any changes in medications, doses, concerns,  etc. Patient expresses understanding and has no further questions at this time.    Princess Robin

## 2021-02-24 ENCOUNTER — ANTICOAGULATION VISIT (OUTPATIENT)
Dept: PHARMACY | Facility: HOSPITAL | Age: 86
End: 2021-02-24

## 2021-02-24 DIAGNOSIS — I63.411 CEREBROVASCULAR ACCIDENT (CVA) DUE TO EMBOLISM OF RIGHT MIDDLE CEREBRAL ARTERY (HCC): ICD-10-CM

## 2021-02-24 DIAGNOSIS — Z95.2 HX OF AORTIC VALVE REPLACEMENT, MECHANICAL: ICD-10-CM

## 2021-02-24 LAB — INR PPP: 3.2

## 2021-02-24 NOTE — PROGRESS NOTES
Anticoagulation Clinic Progress Note    Anticoagulation Summary  As of 2/24/2021    INR goal:  3.0-3.5   TTR:  57.2 % (2.4 y)   INR used for dosing:  3.20 (2/24/2021)   Warfarin maintenance plan:  7.5 mg every Mon, Wed, Fri; 5 mg all other days   Weekly warfarin total:  42.5 mg   No change documented:  Angie Sykes   Plan last modified:  Vasquez Niño RPH (12/16/2020)   Next INR check:  3/3/2021   Priority:  High   Target end date:  Indefinite    Indications    Hx of aortic valve replacement  mechanical [Z95.2] [Z95.2]  Atrial fibrillation (CMS/HCC) [I48.91]  Cerebrovascular accident (CVA) due to embolism of right middle cerebral artery (CMS/HCC) [I63.411]             Anticoagulation Episode Summary     INR check location:      Preferred lab:      Send INR reminders to:   [facility]  POOL    Comments:  New INR goal 3 - 3.5 (see 1/2020 hospitalization for CVA)      Anticoagulation Care Providers     Provider Role Specialty Phone number    Griffin Fried MD Referring Cardiology [phone]          Clinic Interview:  No pertinent clinical findings have been reported.    INR History:  Anticoagulation Monitoring 2/10/2021 2/17/2021 2/24/2021   INR 3.00 3.20 3.20   INR Date 2/10/2021 2/17/2021 2/24/2021   INR Goal 3.0-3.5 3.0-3.5 3.0-3.5   Trend Same Same Same   Last Week Total 42.5 mg 42.5 mg 42.5 mg   Next Week Total 42.5 mg 42.5 mg 42.5 mg   Sun 5 mg 5 mg 5 mg   Mon 7.5 mg 7.5 mg 7.5 mg   Tue 5 mg 5 mg 5 mg   Wed 7.5 mg 7.5 mg 7.5 mg   Thu 5 mg 5 mg 5 mg   Fri 7.5 mg 7.5 mg 7.5 mg   Sat 5 mg 5 mg 5 mg   Visit Report - - -   Some recent data might be hidden       Plan:  1. INR is therapeutic today- see above in Anticoagulation Summary.    Francisco Sequeira to continue their warfarin regimen- see above in Anticoagulation Summary.  2. Follow up in 1 week  3. Pt has agreed to only be called if INR out of range. They have been instructed to call if any changes in medications, doses, concerns, etc.  Patient expresses understanding and has no further questions at this time.    Angie Sykes

## 2021-03-02 ENCOUNTER — OFFICE VISIT (OUTPATIENT)
Dept: FAMILY MEDICINE CLINIC | Facility: CLINIC | Age: 86
End: 2021-03-02

## 2021-03-02 VITALS
OXYGEN SATURATION: 98 % | BODY MASS INDEX: 24.98 KG/M2 | TEMPERATURE: 97.1 F | DIASTOLIC BLOOD PRESSURE: 80 MMHG | WEIGHT: 184.4 LBS | HEIGHT: 72 IN | HEART RATE: 67 BPM | SYSTOLIC BLOOD PRESSURE: 146 MMHG

## 2021-03-02 DIAGNOSIS — I10 ESSENTIAL HYPERTENSION: Chronic | ICD-10-CM

## 2021-03-02 DIAGNOSIS — Z13.0 SCREENING FOR IRON DEFICIENCY ANEMIA: ICD-10-CM

## 2021-03-02 DIAGNOSIS — Z12.5 SPECIAL SCREENING FOR MALIGNANT NEOPLASM OF PROSTATE: ICD-10-CM

## 2021-03-02 DIAGNOSIS — E78.5 HYPERLIPIDEMIA, UNSPECIFIED HYPERLIPIDEMIA TYPE: Chronic | ICD-10-CM

## 2021-03-02 DIAGNOSIS — IMO0002 UNCONTROLLED TYPE 2 DIABETES MELLITUS WITH COMPLICATION, WITH LONG-TERM CURRENT USE OF INSULIN: Primary | ICD-10-CM

## 2021-03-02 LAB — HBA1C MFR BLD: 6.5 %

## 2021-03-02 PROCEDURE — 99214 OFFICE O/P EST MOD 30 MIN: CPT | Performed by: NURSE PRACTITIONER

## 2021-03-02 PROCEDURE — 83036 HEMOGLOBIN GLYCOSYLATED A1C: CPT | Performed by: NURSE PRACTITIONER

## 2021-03-02 NOTE — PROGRESS NOTES
"Chief Complaint  Diabetes (Patient is needing his a1c checked ( wore mask and goggles) )    Subjective     {Problem List  Visit Diagnosis   Encounters  Notes  Medications  Labs  Result Review Imaging  Media :23}     Francisco Sequeira presents to [facility]  History of Present Illness  1. DM II-patient's blood sugar dropped down to the 50s so his wife took him to the emergency room because it scared her.  Since then she has decreased his insulin to 50 units at night.  It was their own fault that his blood sugar dropped that low because she gave him all of his medications and insulin of than he did not eat.  They are well aware of what to do when his blood sugar dropped low she has sugar pills in her purse also they have cake icing at home when needed which she did use at that time.  A1c at last check in August was 6.5 today to get it is 6.5.    2.  Hypertension-patient is currently on metoprolol 25 mg twice daily and seeing a cardiologist for history of aortic valve replacement also on Coumadin and getting his Coumadin checked through cardiology as well.  No issues here today.    3.  Hyperlipidemia-patient is currently on a atorvastatin 40 mg daily as directed without any side effects.  He is fasting for lab work today.     Objective   Vital Signs:   /80   Pulse 67   Temp 97.1 °F (36.2 °C)   Ht 182.9 cm (72.01\")   Wt 83.6 kg (184 lb 6.4 oz)   SpO2 98%   BMI 25.00 kg/m²     Physical Exam  Vitals signs reviewed.   Constitutional:       General: He is not in acute distress.     Appearance: He is well-developed.   HENT:      Head: Normocephalic.   Cardiovascular:      Rate and Rhythm: Normal rate and regular rhythm.      Heart sounds: Normal heart sounds.   Pulmonary:      Effort: Pulmonary effort is normal.      Breath sounds: Normal breath sounds.   Neurological:      Mental Status: He is alert and oriented to person, place, and time.      Gait: Gait normal. "   Psychiatric:         Behavior: Behavior normal.         Thought Content: Thought content normal.         Judgment: Judgment normal.        Result Review :   The following data was reviewed by: LIZA Trinh on 03/02/2021:  CMP    CMP 3/12/20 11/15/20   Glucose 58 (A) 81   BUN 35 (A) 28 (A)   Creatinine 1.66 (A) 1.60 (A)   eGFR Non African Am 40 (A) 42 (A)   Sodium 143 141   Potassium 4.2 4.2   Chloride 104 106   Calcium 8.8 8.9   Albumin 4.00 3.80   Total Bilirubin 0.5 0.4   Alkaline Phosphatase 96 75   AST (SGOT) 26 22   ALT (SGPT) 25 22   (A) Abnormal value       Comments are available for some flowsheets but are not being displayed.           CBC w/diff    CBC w/Diff 3/12/20 11/15/20   WBC 5.94 5.12   RBC 5.12 4.88   Hemoglobin 14.4 13.2   Hematocrit 44.1 41.7   MCV 86.1 85.5   MCH 28.1 27.0   MCHC 32.7 31.7   RDW 14.4 14.8   Platelets 122 (A) 122 (A)   Neutrophil Rel % 61.6 64.6   Immature Granulocyte Rel %  0.2   Lymphocyte Rel % 25.9 25.4   Monocyte Rel % 8.1 7.6   Eosinophil Rel % 3.2 1.4   Basophil Rel % 0.7 0.8   (A) Abnormal value                Most Recent A1C    HGBA1C Most Recent 8/28/20   Hemoglobin A1C 6.5                         Assessment and Plan    Diagnoses and all orders for this visit:    1. Uncontrolled type 2 diabetes mellitus with complication, with long-term current use of insulin (CMS/[facility]) (Primary)  -     POC Glycosylated Hemoglobin (Hb A1C)    2. Essential hypertension    3. Gastroesophageal reflux disease without esophagitis    4. Hyperlipidemia, unspecified hyperlipidemia type  -     Comprehensive Metabolic Panel  -     Lipid Panel With LDL / HDL Ratio    5. Screening for iron deficiency anemia  -     CBC & Differential    6. Special screening for malignant neoplasm of prostate  -     PSA Screen        Follow Up   No follow-ups on file.  Patient was given instructions and counseling regarding his condition or for health maintenance advice. Please see specific information pulled  into the AVS if appropriate.     #1 diabetes continue as is with decreasing the Tresiba down to 50 units at night make sure that he does eat throughout the day when he is doing the insulin.  Patient and wife verbalized understanding.  His wife is very on top of his diabetic care and I do believe that they are doing the right things and appropriate at home.    2.  Continue same with medications and follow-up with cardiology.    3.  Hyperlipidemia continue same with medications follow-up after lab work    Return to the office in 3 months    Mask and googles worn

## 2021-03-03 ENCOUNTER — ANTICOAGULATION VISIT (OUTPATIENT)
Dept: PHARMACY | Facility: HOSPITAL | Age: 86
End: 2021-03-03

## 2021-03-03 DIAGNOSIS — Z95.2 HX OF AORTIC VALVE REPLACEMENT, MECHANICAL: ICD-10-CM

## 2021-03-03 DIAGNOSIS — I63.411 CEREBROVASCULAR ACCIDENT (CVA) DUE TO EMBOLISM OF RIGHT MIDDLE CEREBRAL ARTERY (HCC): ICD-10-CM

## 2021-03-03 LAB
ALBUMIN SERPL-MCNC: 4.2 G/DL (ref 3.5–5.2)
ALBUMIN/GLOB SERPL: 1.6 G/DL
ALP SERPL-CCNC: 95 U/L (ref 39–117)
ALT SERPL-CCNC: 21 U/L (ref 1–41)
AST SERPL-CCNC: 23 U/L (ref 1–40)
BASOPHILS # BLD AUTO: ABNORMAL 10*3/UL
BASOPHILS # BLD MANUAL: 0.05 10*3/MM3 (ref 0–0.2)
BASOPHILS NFR BLD MANUAL: 1 % (ref 0–1.5)
BILIRUB SERPL-MCNC: 0.5 MG/DL (ref 0–1.2)
BUN SERPL-MCNC: 32 MG/DL (ref 8–23)
BUN/CREAT SERPL: 19.9 (ref 7–25)
CALCIUM SERPL-MCNC: 9.1 MG/DL (ref 8.6–10.5)
CHLORIDE SERPL-SCNC: 108 MMOL/L (ref 98–107)
CHOLEST SERPL-MCNC: 147 MG/DL (ref 0–200)
CO2 SERPL-SCNC: 28.5 MMOL/L (ref 22–29)
CREAT SERPL-MCNC: 1.61 MG/DL (ref 0.76–1.27)
DIFFERENTIAL COMMENT: ABNORMAL
EOSINOPHIL # BLD AUTO: ABNORMAL 10*3/UL
EOSINOPHIL # BLD MANUAL: 0 10*3/MM3 (ref 0–0.4)
EOSINOPHIL NFR BLD AUTO: ABNORMAL %
EOSINOPHIL NFR BLD MANUAL: 0 % (ref 0.3–6.2)
ERYTHROCYTE [DISTWIDTH] IN BLOOD BY AUTOMATED COUNT: 14.8 % (ref 12.3–15.4)
GLOBULIN SER CALC-MCNC: 2.7 GM/DL
GLUCOSE SERPL-MCNC: 42 MG/DL (ref 65–99)
HCT VFR BLD AUTO: 45.8 % (ref 37.5–51)
HDLC SERPL-MCNC: 35 MG/DL (ref 40–60)
HGB BLD-MCNC: 14.3 G/DL (ref 13–17.7)
INR PPP: 3
LDLC SERPL CALC-MCNC: 84 MG/DL (ref 0–100)
LDLC/HDLC SERPL: 2.27 {RATIO}
LYMPHOCYTES # BLD AUTO: ABNORMAL 10*3/UL
LYMPHOCYTES # BLD MANUAL: 1.71 10*3/MM3 (ref 0.7–3.1)
LYMPHOCYTES NFR BLD AUTO: ABNORMAL %
LYMPHOCYTES NFR BLD MANUAL: 32 % (ref 19.6–45.3)
MCH RBC QN AUTO: 26.2 PG (ref 26.6–33)
MCHC RBC AUTO-ENTMCNC: 31.2 G/DL (ref 31.5–35.7)
MCV RBC AUTO: 84 FL (ref 79–97)
MONOCYTES # BLD MANUAL: 0.48 10*3/MM3 (ref 0.1–0.9)
MONOCYTES NFR BLD AUTO: ABNORMAL %
MONOCYTES NFR BLD MANUAL: 9 % (ref 5–12)
NEUTROPHILS # BLD MANUAL: 3.09 10*3/MM3 (ref 1.7–7)
NEUTROPHILS NFR BLD AUTO: ABNORMAL %
NEUTROPHILS NFR BLD MANUAL: 58 % (ref 42.7–76)
PLATELET # BLD AUTO: 134 10*3/MM3 (ref 140–450)
PLATELET BLD QL SMEAR: ABNORMAL
POTASSIUM SERPL-SCNC: 4.5 MMOL/L (ref 3.5–5.2)
PROT SERPL-MCNC: 6.9 G/DL (ref 6–8.5)
PSA SERPL-MCNC: 0.1 NG/ML (ref 0–4)
RBC # BLD AUTO: 5.45 10*6/MM3 (ref 4.14–5.8)
RBC MORPH BLD: ABNORMAL
SODIUM SERPL-SCNC: 146 MMOL/L (ref 136–145)
TRIGL SERPL-MCNC: 162 MG/DL (ref 0–150)
VLDLC SERPL CALC-MCNC: 28 MG/DL (ref 5–40)
WBC # BLD AUTO: 5.33 10*3/MM3 (ref 3.4–10.8)

## 2021-03-03 NOTE — PROGRESS NOTES
Anticoagulation Clinic Progress Note    Anticoagulation Summary  As of 3/3/2021    INR goal:  3.0-3.5   TTR:  57.5 % (2.4 y)   INR used for dosing:  3.00 (3/3/2021)   Warfarin maintenance plan:  7.5 mg every Mon, Wed, Fri; 5 mg all other days   Weekly warfarin total:  42.5 mg   No change documented:  Natty Sullivan   Plan last modified:  Vasquez Niño RPH (12/16/2020)   Next INR check:  3/17/2021   Priority:  High   Target end date:  Indefinite    Indications    Hx of aortic valve replacement  mechanical [Z95.2] [Z95.2]  Atrial fibrillation (CMS/HCC) [I48.91]  Cerebrovascular accident (CVA) due to embolism of right middle cerebral artery (CMS/HCC) [I63.411]             Anticoagulation Episode Summary     INR check location:      Preferred lab:      Send INR reminders to:   [facility]  POOL    Comments:  New INR goal 3 - 3.5 (see 1/2020 hospitalization for CVA)      Anticoagulation Care Providers     Provider Role Specialty Phone number    Griffin Fried MD Referring Cardiology [phone]          Clinic Interview:  No pertinent clinical findings have been reported.    INR History:  Anticoagulation Monitoring 2/17/2021 2/24/2021 3/3/2021   INR 3.20 3.20 3.00   INR Date 2/17/2021 2/24/2021 3/3/2021   INR Goal 3.0-3.5 3.0-3.5 3.0-3.5   Trend Same Same Same   Last Week Total 42.5 mg 42.5 mg 42.5 mg   Next Week Total 42.5 mg 42.5 mg 42.5 mg   Sun 5 mg 5 mg 5 mg   Mon 7.5 mg 7.5 mg 7.5 mg   Tue 5 mg 5 mg 5 mg   Wed 7.5 mg 7.5 mg 7.5 mg   Thu 5 mg 5 mg 5 mg   Fri 7.5 mg 7.5 mg 7.5 mg   Sat 5 mg 5 mg 5 mg   Visit Report - - -   Some recent data might be hidden       Plan:  1. INR is therapeutic today- see above in Anticoagulation Summary.    Francisco Sequeira to continue their warfarin regimen- see above in Anticoagulation Summary.  2. Follow up in 2 weeks  3. Pt has agreed to only be called if INR out of range. They have been instructed to call if any changes in medications, doses, concerns,  etc. Patient expresses understanding and has no further questions at this time.    Natty Sullivan

## 2021-03-10 ENCOUNTER — ANTICOAGULATION VISIT (OUTPATIENT)
Dept: PHARMACY | Facility: HOSPITAL | Age: 86
End: 2021-03-10

## 2021-03-10 DIAGNOSIS — Z95.2 HX OF AORTIC VALVE REPLACEMENT, MECHANICAL: ICD-10-CM

## 2021-03-10 DIAGNOSIS — I63.411 CEREBROVASCULAR ACCIDENT (CVA) DUE TO EMBOLISM OF RIGHT MIDDLE CEREBRAL ARTERY (HCC): ICD-10-CM

## 2021-03-10 LAB — INR PPP: 3.1

## 2021-03-10 NOTE — PROGRESS NOTES
Anticoagulation Clinic Progress Note    Anticoagulation Summary  As of 3/10/2021    INR goal:  3.0-3.5   TTR:  57.8 % (2.4 y)   INR used for dosing:  3.10 (3/10/2021)   Warfarin maintenance plan:  7.5 mg every Mon, Wed, Fri; 5 mg all other days   Weekly warfarin total:  42.5 mg   No change documented:  Princess Robin   Plan last modified:  Vasquez Niño RPH (12/16/2020)   Next INR check:  3/17/2021   Priority:  High   Target end date:  Indefinite    Indications    Hx of aortic valve replacement  mechanical [Z95.2] [Z95.2]  Atrial fibrillation (CMS/HCC) [I48.91]  Cerebrovascular accident (CVA) due to embolism of right middle cerebral artery (CMS/HCC) [I63.411]             Anticoagulation Episode Summary     INR check location:      Preferred lab:      Send INR reminders to:   [facility]  POOL    Comments:  New INR goal 3 - 3.5 (see 1/2020 hospitalization for CVA)      Anticoagulation Care Providers     Provider Role Specialty Phone number    Griffin Fried MD Referring Cardiology [phone]          Clinic Interview:  No pertinent clinical findings have been reported.    INR History:  Anticoagulation Monitoring 2/24/2021 3/3/2021 3/10/2021   INR 3.20 3.00 3.10   INR Date 2/24/2021 3/3/2021 3/10/2021   INR Goal 3.0-3.5 3.0-3.5 3.0-3.5   Trend Same Same Same   Last Week Total 42.5 mg 42.5 mg 42.5 mg   Next Week Total 42.5 mg 42.5 mg 42.5 mg   Sun 5 mg 5 mg 5 mg   Mon 7.5 mg 7.5 mg 7.5 mg   Tue 5 mg 5 mg 5 mg   Wed 7.5 mg 7.5 mg 7.5 mg   Thu 5 mg 5 mg 5 mg   Fri 7.5 mg 7.5 mg 7.5 mg   Sat 5 mg 5 mg 5 mg   Visit Report - - -   Some recent data might be hidden       Plan:  1. INR is therapeutic today- see above in Anticoagulation Summary.    Francisco Sequeira to continue their warfarin regimen- see above in Anticoagulation Summary.  2. Follow up in 1 weeks  3. Pt has agreed to only be called if INR out of range. They have been instructed to call if any changes in medications, doses, concerns, etc.  Patient expresses understanding and has no further questions at this time.    Princess Robin

## 2021-03-17 ENCOUNTER — ANTICOAGULATION VISIT (OUTPATIENT)
Dept: PHARMACY | Facility: HOSPITAL | Age: 86
End: 2021-03-17

## 2021-03-17 DIAGNOSIS — I63.411 CEREBROVASCULAR ACCIDENT (CVA) DUE TO EMBOLISM OF RIGHT MIDDLE CEREBRAL ARTERY (HCC): ICD-10-CM

## 2021-03-17 DIAGNOSIS — Z95.2 HX OF AORTIC VALVE REPLACEMENT, MECHANICAL: ICD-10-CM

## 2021-03-17 LAB — INR PPP: 3

## 2021-03-17 NOTE — PROGRESS NOTES
Anticoagulation Clinic Progress Note    Anticoagulation Summary  As of 3/17/2021    INR goal:  3.0-3.5   TTR:  58.2 % (2.4 y)   INR used for dosing:  3.00 (3/17/2021)   Warfarin maintenance plan:  7.5 mg every Mon, Wed, Fri; 5 mg all other days   Weekly warfarin total:  42.5 mg   No change documented:  Angie Sykes   Plan last modified:  Vasquez Niño RPH (12/16/2020)   Next INR check:  3/24/2021   Priority:  High   Target end date:  Indefinite    Indications    Hx of aortic valve replacement  mechanical [Z95.2] [Z95.2]  Atrial fibrillation (CMS/HCC) [I48.91]  Cerebrovascular accident (CVA) due to embolism of right middle cerebral artery (CMS/HCC) [I63.411]             Anticoagulation Episode Summary     INR check location:      Preferred lab:      Send INR reminders to:   [facility]  POOL    Comments:  New INR goal 3 - 3.5 (see 1/2020 hospitalization for CVA)      Anticoagulation Care Providers     Provider Role Specialty Phone number    Griffin Fried MD Referring Cardiology [phone]          Clinic Interview:  No pertinent clinical findings have been reported.    INR History:  Anticoagulation Monitoring 3/3/2021 3/10/2021 3/17/2021   INR 3.00 3.10 3.00   INR Date 3/3/2021 3/10/2021 3/17/2021   INR Goal 3.0-3.5 3.0-3.5 3.0-3.5   Trend Same Same Same   Last Week Total 42.5 mg 42.5 mg 42.5 mg   Next Week Total 42.5 mg 42.5 mg 42.5 mg   Sun 5 mg 5 mg 5 mg   Mon 7.5 mg 7.5 mg 7.5 mg   Tue 5 mg 5 mg 5 mg   Wed 7.5 mg 7.5 mg 7.5 mg   Thu 5 mg 5 mg 5 mg   Fri 7.5 mg 7.5 mg 7.5 mg   Sat 5 mg 5 mg 5 mg   Visit Report - - -   Some recent data might be hidden       Plan:  1. INR is therapeutic today- see above in Anticoagulation Summary.    Francisco Sequeira to continue their warfarin regimen- see above in Anticoagulation Summary.  2. Follow up in 1 week  3. Pt has agreed to only be called if INR out of range. They have been instructed to call if any changes in medications, doses, concerns, etc.  Patient expresses understanding and has no further questions at this time.    Angie Sykes

## 2021-03-22 DIAGNOSIS — IMO0002 UNCONTROLLED TYPE 2 DIABETES MELLITUS WITH COMPLICATION, WITH LONG-TERM CURRENT USE OF INSULIN: ICD-10-CM

## 2021-03-22 RX ORDER — SUB-Q INSULIN DEVICE, 40 UNIT
EACH MISCELLANEOUS
Qty: 30 EACH | Refills: 3 | Status: SHIPPED | OUTPATIENT
Start: 2021-03-22 | End: 2021-07-07

## 2021-03-24 ENCOUNTER — ANTICOAGULATION VISIT (OUTPATIENT)
Dept: PHARMACY | Facility: HOSPITAL | Age: 86
End: 2021-03-24

## 2021-03-24 DIAGNOSIS — Z95.2 HX OF AORTIC VALVE REPLACEMENT, MECHANICAL: ICD-10-CM

## 2021-03-24 DIAGNOSIS — I63.411 CEREBROVASCULAR ACCIDENT (CVA) DUE TO EMBOLISM OF RIGHT MIDDLE CEREBRAL ARTERY (HCC): ICD-10-CM

## 2021-03-24 LAB — INR PPP: 3.4

## 2021-03-24 NOTE — PROGRESS NOTES
Anticoagulation Clinic Progress Note    Anticoagulation Summary  As of 3/24/2021    INR goal:  3.0-3.5   TTR:  58.5 % (2.5 y)   INR used for dosing:  3.40 (3/24/2021)   Warfarin maintenance plan:  7.5 mg every Mon, Wed, Fri; 5 mg all other days   Weekly warfarin total:  42.5 mg   No change documented:  Natty Sullivan   Plan last modified:  Vasquez Niño RPH (12/16/2020)   Next INR check:  3/31/2021   Priority:  High   Target end date:  Indefinite    Indications    Hx of aortic valve replacement  mechanical [Z95.2] [Z95.2]  Atrial fibrillation (CMS/HCC) [I48.91]  Cerebrovascular accident (CVA) due to embolism of right middle cerebral artery (CMS/HCC) [I63.411]             Anticoagulation Episode Summary     INR check location:      Preferred lab:      Send INR reminders to:   [facility]  POOL    Comments:  New INR goal 3 - 3.5 (see 1/2020 hospitalization for CVA)      Anticoagulation Care Providers     Provider Role Specialty Phone number    Griffin Fried MD Referring Cardiology [phone]          Clinic Interview:  No pertinent clinical findings have been reported.    INR History:  Anticoagulation Monitoring 3/10/2021 3/17/2021 3/24/2021   INR 3.10 3.00 3.40   INR Date 3/10/2021 3/17/2021 3/24/2021   INR Goal 3.0-3.5 3.0-3.5 3.0-3.5   Trend Same Same Same   Last Week Total 42.5 mg 42.5 mg 42.5 mg   Next Week Total 42.5 mg 42.5 mg 42.5 mg   Sun 5 mg 5 mg 5 mg   Mon 7.5 mg 7.5 mg 7.5 mg   Tue 5 mg 5 mg 5 mg   Wed 7.5 mg 7.5 mg 7.5 mg   Thu 5 mg 5 mg 5 mg   Fri 7.5 mg 7.5 mg 7.5 mg   Sat 5 mg 5 mg 5 mg   Visit Report - - -   Some recent data might be hidden       Plan:  1. INR is therapeutic today- see above in Anticoagulation Summary.    Francisco Sequeira to continue their warfarin regimen- see above in Anticoagulation Summary.  2. Follow up in 1 week  3. Pt has agreed to only be called if INR out of range. They have been instructed to call if any changes in medications, doses,  concerns, etc. Patient expresses understanding and has no further questions at this time.    Natty Sullivan

## 2021-03-30 RX ORDER — ASPIRIN 81 MG/1
TABLET ORAL
Qty: 90 TABLET | Refills: 2 | Status: ON HOLD | OUTPATIENT
Start: 2021-03-30 | End: 2021-11-04

## 2021-03-31 ENCOUNTER — ANTICOAGULATION VISIT (OUTPATIENT)
Dept: PHARMACY | Facility: HOSPITAL | Age: 86
End: 2021-03-31

## 2021-03-31 DIAGNOSIS — I63.411 CEREBROVASCULAR ACCIDENT (CVA) DUE TO EMBOLISM OF RIGHT MIDDLE CEREBRAL ARTERY (HCC): ICD-10-CM

## 2021-03-31 DIAGNOSIS — Z95.2 HX OF AORTIC VALVE REPLACEMENT, MECHANICAL: ICD-10-CM

## 2021-03-31 LAB — INR PPP: 3.2

## 2021-03-31 NOTE — PROGRESS NOTES
Anticoagulation Clinic Progress Note    Anticoagulation Summary  As of 3/31/2021    INR goal:  3.0-3.5   TTR:  58.8 % (2.5 y)   INR used for dosing:  3.20 (3/31/2021)   Warfarin maintenance plan:  7.5 mg every Mon, Wed, Fri; 5 mg all other days   Weekly warfarin total:  42.5 mg   No change documented:  Angie Sykes   Plan last modified:  Vasquez Niño RPH (12/16/2020)   Next INR check:  4/7/2021   Priority:  High   Target end date:  Indefinite    Indications    Hx of aortic valve replacement  mechanical [Z95.2] [Z95.2]  Atrial fibrillation (CMS/HCC) [I48.91]  Cerebrovascular accident (CVA) due to embolism of right middle cerebral artery (CMS/HCC) [I63.411]             Anticoagulation Episode Summary     INR check location:      Preferred lab:      Send INR reminders to:   [facility]  POOL    Comments:  New INR goal 3 - 3.5 (see 1/2020 hospitalization for CVA)      Anticoagulation Care Providers     Provider Role Specialty Phone number    Griffin Fried MD Referring Cardiology [phone]          Clinic Interview:  No pertinent clinical findings have been reported.    INR History:  Anticoagulation Monitoring 3/17/2021 3/24/2021 3/31/2021   INR 3.00 3.40 3.20   INR Date 3/17/2021 3/24/2021 3/31/2021   INR Goal 3.0-3.5 3.0-3.5 3.0-3.5   Trend Same Same Same   Last Week Total 42.5 mg 42.5 mg 42.5 mg   Next Week Total 42.5 mg 42.5 mg 42.5 mg   Sun 5 mg 5 mg 5 mg   Mon 7.5 mg 7.5 mg 7.5 mg   Tue 5 mg 5 mg 5 mg   Wed 7.5 mg 7.5 mg 7.5 mg   Thu 5 mg 5 mg 5 mg   Fri 7.5 mg 7.5 mg 7.5 mg   Sat 5 mg 5 mg 5 mg   Visit Report - - -   Some recent data might be hidden       Plan:  1. INR is therapeutic today- see above in Anticoagulation Summary.    Francisco Sequeira to continue their warfarin regimen- see above in Anticoagulation Summary.  2. Follow up in 1 week  3. Pt has agreed to only be called if INR out of range. They have been instructed to call if any changes in medications, doses, concerns, etc.  Patient expresses understanding and has no further questions at this time.    Angie Sykes

## 2021-04-07 ENCOUNTER — ANTICOAGULATION VISIT (OUTPATIENT)
Dept: PHARMACY | Facility: HOSPITAL | Age: 86
End: 2021-04-07

## 2021-04-07 DIAGNOSIS — Z95.2 HX OF AORTIC VALVE REPLACEMENT, MECHANICAL: ICD-10-CM

## 2021-04-07 DIAGNOSIS — I63.411 CEREBROVASCULAR ACCIDENT (CVA) DUE TO EMBOLISM OF RIGHT MIDDLE CEREBRAL ARTERY (HCC): ICD-10-CM

## 2021-04-07 LAB — INR PPP: 3.2

## 2021-04-07 NOTE — PROGRESS NOTES
Anticoagulation Clinic Progress Note    Anticoagulation Summary  As of 4/7/2021    INR goal:  3.0-3.5   TTR:  59.1 % (2.5 y)   INR used for dosing:  3.20 (4/7/2021)   Warfarin maintenance plan:  7.5 mg every Mon, Wed, Fri; 5 mg all other days   Weekly warfarin total:  42.5 mg   No change documented:  Angie Sykes   Plan last modified:  Vasquez Niño RPH (12/16/2020)   Next INR check:  4/14/2021   Priority:  High   Target end date:  Indefinite    Indications    Hx of aortic valve replacement  mechanical [Z95.2] [Z95.2]  Atrial fibrillation (CMS/HCC) [I48.91]  Cerebrovascular accident (CVA) due to embolism of right middle cerebral artery (CMS/HCC) [I63.411]             Anticoagulation Episode Summary     INR check location:      Preferred lab:      Send INR reminders to:   [facility]  POOL    Comments:  New INR goal 3 - 3.5 (see 1/2020 hospitalization for CVA)      Anticoagulation Care Providers     Provider Role Specialty Phone number    Griffin Fried MD Referring Cardiology [phone]          Clinic Interview:  No pertinent clinical findings have been reported.    INR History:  Anticoagulation Monitoring 3/24/2021 3/31/2021 4/7/2021   INR 3.40 3.20 3.20   INR Date 3/24/2021 3/31/2021 4/7/2021   INR Goal 3.0-3.5 3.0-3.5 3.0-3.5   Trend Same Same Same   Last Week Total 42.5 mg 42.5 mg 42.5 mg   Next Week Total 42.5 mg 42.5 mg 42.5 mg   Sun 5 mg 5 mg 5 mg   Mon 7.5 mg 7.5 mg 7.5 mg   Tue 5 mg 5 mg 5 mg   Wed 7.5 mg 7.5 mg 7.5 mg   Thu 5 mg 5 mg 5 mg   Fri 7.5 mg 7.5 mg 7.5 mg   Sat 5 mg 5 mg 5 mg   Visit Report - - -   Some recent data might be hidden       Plan:  1. INR is therapeutic today- see above in Anticoagulation Summary.    Francisco Sequeira to continue their warfarin regimen- see above in Anticoagulation Summary.  2. Follow up in 1 week  3. Pt has agreed to only be called if INR out of range. They have been instructed to call if any changes in medications, doses, concerns, etc.  Patient expresses understanding and has no further questions at this time.    Angie Sykes

## 2021-04-08 RX ORDER — INSULIN DEGLUDEC INJECTION 100 U/ML
INJECTION, SOLUTION SUBCUTANEOUS
Qty: 8 PEN | Refills: 1 | Status: SHIPPED | OUTPATIENT
Start: 2021-04-08 | End: 2021-06-07

## 2021-04-13 ENCOUNTER — OFFICE VISIT (OUTPATIENT)
Dept: NEUROLOGY | Facility: CLINIC | Age: 86
End: 2021-04-13

## 2021-04-13 VITALS
DIASTOLIC BLOOD PRESSURE: 78 MMHG | HEART RATE: 62 BPM | SYSTOLIC BLOOD PRESSURE: 150 MMHG | WEIGHT: 182 LBS | HEIGHT: 72 IN | BODY MASS INDEX: 24.65 KG/M2 | OXYGEN SATURATION: 100 %

## 2021-04-13 DIAGNOSIS — I48.21 PERMANENT ATRIAL FIBRILLATION (HCC): ICD-10-CM

## 2021-04-13 DIAGNOSIS — I63.411 CEREBROVASCULAR ACCIDENT (CVA) DUE TO EMBOLISM OF RIGHT MIDDLE CEREBRAL ARTERY (HCC): Primary | ICD-10-CM

## 2021-04-13 DIAGNOSIS — Z91.81 AT MODERATE RISK FOR FALL: ICD-10-CM

## 2021-04-13 PROCEDURE — 99213 OFFICE O/P EST LOW 20 MIN: CPT | Performed by: NURSE PRACTITIONER

## 2021-04-13 NOTE — PATIENT INSTRUCTIONS
Fall Prevention in the Home, Adult  Falls can cause injuries and can affect people from all age groups. There are many simple things that you can do to make your home safe and to help prevent falls. Ask for help when making these changes, if needed.  What actions can I take to prevent falls?  General instructions  · Use good lighting in all rooms. Replace any light bulbs that burn out.  · Turn on lights if it is dark. Use night-lights.  · Place frequently used items in easy-to-reach places. Lower the shelves around your home if necessary.  · Set up furniture so that there are clear paths around it. Avoid moving your furniture around.  · Remove throw rugs and other tripping hazards from the floor.  · Avoid walking on wet floors.  · Fix any uneven floor surfaces.  · Add color or contrast paint or tape to grab bars and handrails in your home. Place contrasting color strips on the first and last steps of stairways.  · When you use a stepladder, make sure that it is completely opened and that the sides are firmly locked. Have someone hold the ladder while you are using it. Do not climb a closed stepladder.  · Be aware of any and all pets.  What can I do in the bathroom?         · Keep the floor dry. Immediately clean up any water that spills onto the floor.  · Remove soap buildup in the tub or shower on a regular basis.  · Use non-skid mats or decals on the floor of the tub or shower.  · Attach bath mats securely with double-sided, non-slip rug tape.  · If you need to sit down while you are in the shower, use a plastic, non-slip stool.  · Install grab bars by the toilet and in the tub and shower. Do not use towel bars as grab bars.  What can I do in the bedroom?  · Make sure that a bedside light is easy to reach.  · Do not use oversized bedding that drapes onto the floor.  · Have a firm chair that has side arms to use for getting dressed.  What can I do in the kitchen?  · Clean up any spills right away.  · If you  need to reach for something above you, use a sturdy step stool that has a grab bar.  · Keep electrical cables out of the way.  · Do not use floor polish or wax that makes floors slippery. If you must use wax, make sure that it is non-skid floor wax.  What can I do in the stairways?  · Do not leave any items on the stairs.  · Make sure that you have a light switch at the top of the stairs and the bottom of the stairs. Have them installed if you do not have them.  · Make sure that there are handrails on both sides of the stairs. Fix handrails that are broken or loose. Make sure that handrails are as long as the stairways.  · Install non-slip stair treads on all stairs in your home.  · Avoid having throw rugs at the top or bottom of stairways, or secure the rugs with carpet tape to prevent them from moving.  · Choose a carpet design that does not hide the edge of steps on the stairway.  · Check any carpeting to make sure that it is firmly attached to the stairs. Fix any carpet that is loose or worn.  What can I do on the outside of my home?  · Use bright outdoor lighting.  · Regularly repair the edges of walkways and driveways and fix any cracks.  · Remove high doorway thresholds.  · Trim any shrubbery on the main path into your home.  · Regularly check that handrails are securely fastened and in good repair. Both sides of any steps should have handrails.  · Install guardrails along the edges of any raised decks or porches.  · Clear walkways of debris and clutter, including tools and rocks.  · Have leaves, snow, and ice cleared regularly.  · Use sand or salt on walkways during winter months.  · In the garage, clean up any spills right away, including grease or oil spills.  What other actions can I take?  · Wear closed-toe shoes that fit well and support your feet. Wear shoes that have rubber soles or low heels.  · Use mobility aids as needed, such as canes, walkers, scooters, and crutches.  · Review your medicines with  your health care provider. Some medicines can cause dizziness or changes in blood pressure, which increase your risk of falling.  Talk with your health care provider about other ways that you can decrease your risk of falls. This may include working with a physical therapist or  to improve your strength, balance, and endurance.  Where to find more information  · Centers for Disease Control and Prevention, [place]: https://www.cdc.gov  · National [place] on Aging: https://cu3nznq.charmaine.nih.gov  Contact a health care provider if:  · You are afraid of falling at home.  · You feel weak, drowsy, or dizzy at home.  · You fall at home.  Summary  · There are many simple things that you can do to make your home safe and to help prevent falls.  · Ways to make your home safe include removing tripping hazards and installing grab bars in the bathroom.  · Ask for help when making these changes in your home.  This information is not intended to replace advice given to you by your health care provider. Make sure you discuss any questions you have with your health care provider.  Document Revised: 11/30/2018 Document Reviewed: 08/02/2018  Elsevier Patient Education © 2021 [employer]

## 2021-04-13 NOTE — PROGRESS NOTES
DOS: 2021  NAME: Francisco Sequeira   : 1935  PCP: Rubin Hinkle APRN    Chief Complaint   Patient presents with   • Stroke      Referring MD: No ref. provider found    Neurological Problem:  85 y.o. right-handed male with a Hx of cardiomyopathy, aortic valve replacement with mechanical aortic valve, hypertension, diabetes, hyperlipidemia who presents today for stroke follow-up.  He is accompanied by his wife.  History is provided by the patient, wife, and review of records as summarized below.    Interval History:  Mr. Sequeira presents today for stroke follow-up.  I last saw him via video visit in 2020 for a hospital follow-up after he suffered his stroke.  At that time he had been maintained on Coumadin and full dose aspirin with Lipitor for secondary stroke prevention.  He had had no new stroke/TIA symptoms.  I decreased his aspirin to 81 mg and encouraged him to continue his weekly PT sessions.  Today he presents and continues on warfarin and low-dose aspirin with Lipitor and reports no new stroke/TIA symptoms since I last saw him in 2020.  He did graduate from therapy.  He has had 2 falls over the last year.  He did not have any head trauma.  He reports he was out working in the yard and lost his balance.  He does walk with a cane.  Wife reports he does not do any of the therapy exercises that were left with him.  His BP is usually normalized.  Today it was 150/78.  He eats a well-balanced diet.  He does not drink near as much water as he should.  Overall he feels he is doing well.  He may have a little bit of left foot weakness left over from his stroke.    Review of Systems:        Review of Systems   Eyes: Negative for visual disturbance.   Respiratory: Negative for chest tightness, shortness of breath and wheezing.    Cardiovascular: Negative for chest pain, palpitations and leg swelling.   Musculoskeletal: Negative for gait problem.   Neurological: Negative for dizziness, tremors,  "seizures, syncope, facial asymmetry, speech difficulty, weakness, light-headedness, numbness and headaches.   Psychiatric/Behavioral: Negative for agitation, behavioral problems, confusion, decreased concentration, dysphoric mood, hallucinations, self-injury, sleep disturbance and suicidal ideas. The patient is not nervous/anxious and is not hyperactive.        \"The following portions of the patient's history were reviewed and updated as appropriate: allergies, current medications, past family history, past medical history, past social history, past surgical history and problem list.\"    Review and Interpretation of Imaging as described in interval history.    Laboratory Results:             Lab Results   Component Value Date    HGBA1C 6.5 03/02/2021     Lab Results   Component Value Date    CHOL 154 01/14/2020     Lab Results   Component Value Date    HDL 35 (L) 03/02/2021    HDL 34 (L) 01/14/2020    HDL 37 (L) 11/15/2019     Lab Results   Component Value Date    LDL 84 03/02/2021    LDL 88 01/14/2020    LDL 87 11/15/2019     Lab Results   Component Value Date    TRIG 162 (H) 03/02/2021    TRIG 161 (H) 01/14/2020    TRIG 206 (H) 11/15/2019     No components found for: CHOLHDL  No results found for: RPR  Lab Results   Component Value Date    TSH 3.250 01/15/2020     Lab Results   Component Value Date    JOPLITAP27 381 01/13/2020     Lab Results   Component Value Date    GLUCOSE 81 11/15/2020    BUN 32 (H) 03/02/2021    CREATININE 1.61 (H) 03/02/2021    EGFRIFNONA 41 (L) 03/02/2021    EGFRIFAFRI 50 (L) 03/02/2021    BCR 19.9 03/02/2021    K 4.5 03/02/2021    CO2 28.5 03/02/2021    CALCIUM 9.1 03/02/2021    PROTENTOTREF 6.9 03/02/2021    ALBUMIN 4.20 03/02/2021    LABIL2 1.6 03/02/2021    AST 23 03/02/2021    ALT 21 03/02/2021     Lab Results   Component Value Date    WBC 5.33 03/02/2021    HGB 14.3 03/02/2021    HCT 45.8 03/02/2021    MCV 84.0 03/02/2021     (L) 03/02/2021     Lab Results   Component Value " Date    INR 3.20 04/07/2021    INR 3.20 03/31/2021    INR 3.40 03/24/2021    PROTIME 28.5 (H) 11/15/2020    PROTIME 32.7 (H) 03/19/2020    PROTIME 26.1 (H) 03/12/2020       Physical Examination:   mRS:   General Appearance:   Elderly pleasant male, well developed, well nourished, well groomed, alert, and cooperative.  HEENT: Normocephalic. Atraumatic.   Cardiac: Regular rate irregular rhythm.   Extremities:    No obvious edema.  Respiratory:   Even and unlabored.    Neurological examination:  Higher Integrative  Function: Oriented to time, place and person. Normal registration. Needing cueing on 2/3 recall words, normal attention span and concentration. Normal language including comprehension, spontaneous speech, repetition, reading, writing, naming and vocabulary. No neglect with normal visual-spatial function and construction. Normal fund of knowledge and higher integrative function.  CN II: Normal visual acuity and visual fields.    CN III IV VI: Extraocular movements are full without nystagmus.   CN V: Normal facial sensation and strength of muscles of mastication.  CN VII: Facial movements are symmetric. No weakness.  CN VIII:   Auditory acuity is decreased bilaterally.  CN IX & X:   Symmetric palatal movement.  CN XI: Sternocleidomastoid and trapezius are normal.  No weakness.  CN XII:   The tongue is midline.  No atrophy or fasciculations.  Motor: Normal muscle strength in upper extremities, 4/5 BLE, normal bulk and tone in upper and lower extremities.  No fasciculations, rigidity, spasticity, or abnormal movements.  Reflexes: 2+ in the upper extremities.   Sensation: Normal to light touch, vibration, temperature, in arms and legs.   Station and Gait: Use cane in right hand, stooped posture, normal gait.  Coordination: Finger to nose test shows no dysmetria.  Rapid alternating movements are normal.      Impression/Assessment:    Mr. Sequeira presents today for stroke follow-up.  He suffered a right MCA  infarct in January 2020 secondary to his chronic atrial fibrillation.  He has been maintained on warfarin as well as low-dose aspirin and atorvastatin 40 mg with no new stroke/TIA symptoms since he saw me last in April 2020.  He has been doing well overall.  I did encourage him to start doing the exercises that were provided to him by PT.  Wife reports he does not get much physical activity unless he is out working in the yard.  We discussed the importance of falls  precautions given that he is on both anticoagulant and antiplatelet therapy.  I reviewed his last LDL he had drawn last month which was 84.  Okay to continue Lipitor 40 mg.  He will let me know if he feels he needs further physical therapy treatments. We discussed at length his personal risk factors for stroke and importance of risk factor control for stroke prevention, including BP and cholesterol control.  He will monitor BP regularly and follow-up with PCP for continued cholesterol surveillance.  We discussed stroke/TIA symptoms and importance of calling 911 if he were to experience any of these.  Follow-up as needed.      Plan:     Continue warfarin, goal INR set by Dr. Fried.  Continue ASA 81 mg, monitor for signs and symptoms of bleeding.  Continue Lipitor, last LDL 84.  Monitor BP regularly  Keep well-hydrated  Encourage regular physical activity as tolerated, start back doing PT exercises provided to him during last therapy session.  Falls precautions discussed  Maintain well-balanced diet   Blood pressure control to <130/80   Goal LDL <100    Serum glucose < 140   Call 911 for stroke any stroke symptoms   Follow-up as needed.  Diagnoses and all orders for this visit:    1. Cerebrovascular accident (CVA) due to embolism of right middle cerebral artery (CMS/HCC) (Primary)    2. Permanent atrial fibrillation (CMS/HCC)      MDM  Reviewed: previous chart and vitals  Reviewed previous: labs, MRI and CT scan  Interpretation: labs, MRI and CT scan        Marilee Finn, APRN

## 2021-04-14 ENCOUNTER — ANTICOAGULATION VISIT (OUTPATIENT)
Dept: PHARMACY | Facility: HOSPITAL | Age: 86
End: 2021-04-14

## 2021-04-14 DIAGNOSIS — Z95.2 HX OF AORTIC VALVE REPLACEMENT, MECHANICAL: Primary | ICD-10-CM

## 2021-04-14 DIAGNOSIS — I63.411 CEREBROVASCULAR ACCIDENT (CVA) DUE TO EMBOLISM OF RIGHT MIDDLE CEREBRAL ARTERY (HCC): ICD-10-CM

## 2021-04-14 LAB — INR PPP: 3.5

## 2021-04-14 NOTE — PROGRESS NOTES
Anticoagulation Clinic Progress Note    Anticoagulation Summary  As of 4/14/2021    INR goal:  3.0-3.5   TTR:  59.5 % (2.5 y)   INR used for dosing:  3.50 (4/14/2021)   Warfarin maintenance plan:  7.5 mg every Mon, Wed, Fri; 5 mg all other days   Weekly warfarin total:  42.5 mg   Plan last modified:  Vasquez Niño RPH (12/16/2020)   Next INR check:  4/21/2021   Priority:  High   Target end date:  Indefinite    Indications    Hx of aortic valve replacement  mechanical [Z95.2] [Z95.2]  Atrial fibrillation (CMS/HCC) [I48.91]  Cerebrovascular accident (CVA) due to embolism of right middle cerebral artery (CMS/HCC) [I63.411]             Anticoagulation Episode Summary     INR check location:      Preferred lab:      [facility]    Comments:  New INR goal 3 - 3.5 (see 1/2020 hospitalization for CVA)      Anticoagulation Care Providers     Provider Role Specialty Phone number    Griffin Fried MD Referring Cardiology [phone]          Clinic Interview:  No pertinent clinical findings have been reported.    INR History:  Anticoagulation Monitoring 3/31/2021 4/7/2021 4/14/2021   INR 3.20 3.20 3.50   INR Date 3/31/2021 4/7/2021 4/14/2021   INR Goal 3.0-3.5 3.0-3.5 3.0-3.5   Trend Same Same Same   Last Week Total 42.5 mg 42.5 mg 42.5 mg   Next Week Total 42.5 mg 42.5 mg 42.5 mg   Sun 5 mg 5 mg 5 mg   Mon 7.5 mg 7.5 mg 7.5 mg   Tue 5 mg 5 mg 5 mg   Wed 7.5 mg 7.5 mg 7.5 mg   Thu 5 mg 5 mg 5 mg   Fri 7.5 mg 7.5 mg 7.5 mg   Sat 5 mg 5 mg 5 mg   Visit Report - - -   Some recent data might be hidden       Plan:  1. INR is therapeutic today- see above in Anticoagulation Summary.    Francisco Sequeira to continue their warfarin regimen- see above in Anticoagulation Summary.  2. Follow up in 1 week.  3. Pt has agreed to only be called if INR out of range. They have been instructed to call if any changes in medications, doses, concerns, etc. Patient expresses understanding and has no  further questions at this time.    Natty Sullivan

## 2021-04-21 ENCOUNTER — ANTICOAGULATION VISIT (OUTPATIENT)
Dept: PHARMACY | Facility: HOSPITAL | Age: 86
End: 2021-04-21

## 2021-04-21 DIAGNOSIS — Z95.2 HX OF AORTIC VALVE REPLACEMENT, MECHANICAL: Primary | ICD-10-CM

## 2021-04-21 DIAGNOSIS — I63.411 CEREBROVASCULAR ACCIDENT (CVA) DUE TO EMBOLISM OF RIGHT MIDDLE CEREBRAL ARTERY (HCC): ICD-10-CM

## 2021-04-21 LAB — INR PPP: 3.3

## 2021-04-21 NOTE — PROGRESS NOTES
Anticoagulation Clinic Progress Note    Anticoagulation Summary  As of 4/21/2021    INR goal:  3.0-3.5   TTR:  59.8 % (2.5 y)   INR used for dosing:  3.30 (4/21/2021)   Warfarin maintenance plan:  7.5 mg every Mon, Wed, Fri; 5 mg all other days   Weekly warfarin total:  42.5 mg   Plan last modified:  Vasquez Niño RPH (12/16/2020)   Next INR check:  5/5/2021   Priority:  High   Target end date:  Indefinite    Indications    Hx of aortic valve replacement  mechanical [Z95.2] [Z95.2]  Atrial fibrillation (CMS/HCC) [I48.91]  Cerebrovascular accident (CVA) due to embolism of right middle cerebral artery (CMS/HCC) [I63.411]             Anticoagulation Episode Summary     INR check location:      Preferred lab:      [facility]    Comments:  New INR goal 3 - 3.5 (see 1/2020 hospitalization for CVA)      Anticoagulation Care Providers     Provider Role Specialty Phone number    Griffin Fried MD Referring Cardiology [phone]          Clinic Interview:  No pertinent clinical findings have been reported.    INR History:  Anticoagulation Monitoring 4/7/2021 4/14/2021 4/21/2021   INR 3.20 3.50 3.30   INR Date 4/7/2021 4/14/2021 4/21/2021   INR Goal 3.0-3.5 3.0-3.5 3.0-3.5   Trend Same Same Same   Last Week Total 42.5 mg 42.5 mg 42.5 mg   Next Week Total 42.5 mg 42.5 mg 42.5 mg   Sun 5 mg 5 mg 5 mg   Mon 7.5 mg 7.5 mg 7.5 mg   Tue 5 mg 5 mg 5 mg   Wed 7.5 mg 7.5 mg 7.5 mg   Thu 5 mg 5 mg 5 mg   Fri 7.5 mg 7.5 mg 7.5 mg   Sat 5 mg 5 mg 5 mg   Visit Report - - -   Some recent data might be hidden       Plan:  1. INR is therapeutic today- see above in Anticoagulation Summary.    Francisco Sequeira to continue their warfarin regimen- see above in Anticoagulation Summary.  2. Follow up in 2 weeks  3. Pt has agreed to only be called if INR out of range. They have been instructed to call if any changes in medications, doses, concerns, etc. Patient expresses understanding and has no  further questions at this time.    Princess Robin

## 2021-04-28 ENCOUNTER — ANTICOAGULATION VISIT (OUTPATIENT)
Dept: PHARMACY | Facility: HOSPITAL | Age: 86
End: 2021-04-28

## 2021-04-28 DIAGNOSIS — Z95.2 HX OF AORTIC VALVE REPLACEMENT, MECHANICAL: Primary | ICD-10-CM

## 2021-04-28 DIAGNOSIS — I63.411 CEREBROVASCULAR ACCIDENT (CVA) DUE TO EMBOLISM OF RIGHT MIDDLE CEREBRAL ARTERY (HCC): ICD-10-CM

## 2021-04-28 LAB — INR PPP: 3.4

## 2021-04-28 NOTE — PROGRESS NOTES
Anticoagulation Clinic Progress Note    Anticoagulation Summary  As of 4/28/2021    INR goal:  3.0-3.5   TTR:  60.1 % (2.6 y)   INR used for dosing:  3.40 (4/28/2021)   Warfarin maintenance plan:  7.5 mg every Mon, Wed, Fri; 5 mg all other days   Weekly warfarin total:  42.5 mg   No change documented:  Angie Sykes   Plan last modified:  Vasquez Niño RPH (12/16/2020)   Next INR check:  5/5/2021   Priority:  High   Target end date:  Indefinite    Indications    Hx of aortic valve replacement  mechanical [Z95.2] [Z95.2]  Atrial fibrillation (CMS/HCC) [I48.91]  Cerebrovascular accident (CVA) due to embolism of right middle cerebral artery (CMS/HCC) [I63.411]             Anticoagulation Episode Summary     INR check location:      Preferred lab:      Send INR reminders to:   [facility] GUTIERREZ  POOL    Comments:  New INR goal 3 - 3.5 (see 1/2020 hospitalization for CVA)      Anticoagulation Care Providers     Provider Role Specialty Phone number    Griffin Fried MD Referring Cardiology [phone]          Clinic Interview:  No pertinent clinical findings have been reported.    INR History:  Anticoagulation Monitoring 4/14/2021 4/21/2021 4/28/2021   INR 3.50 3.30 3.40   INR Date 4/14/2021 4/21/2021 4/28/2021   INR Goal 3.0-3.5 3.0-3.5 3.0-3.5   Trend Same Same Same   Last Week Total 42.5 mg 42.5 mg 42.5 mg   Next Week Total 42.5 mg 42.5 mg 42.5 mg   Sun 5 mg 5 mg 5 mg   Mon 7.5 mg 7.5 mg 7.5 mg   Tue 5 mg 5 mg 5 mg   Wed 7.5 mg 7.5 mg 7.5 mg   Thu 5 mg 5 mg 5 mg   Fri 7.5 mg 7.5 mg 7.5 mg   Sat 5 mg 5 mg 5 mg   Visit Report - - -   Some recent data might be hidden       Plan:  1. INR is therapeutic today- see above in Anticoagulation Summary.    Francisco Sequeira to continue their warfarin regimen- see above in Anticoagulation Summary.  2. Follow up in 1 week  3. Pt has agreed to only be called if INR out of range. They have been instructed to call if any changes in medications, doses, concerns, etc.  Patient expresses understanding and has no further questions at this time.    Angie Sykes

## 2021-05-05 ENCOUNTER — ANTICOAGULATION VISIT (OUTPATIENT)
Dept: PHARMACY | Facility: HOSPITAL | Age: 86
End: 2021-05-05

## 2021-05-05 DIAGNOSIS — Z95.2 HX OF AORTIC VALVE REPLACEMENT, MECHANICAL: Primary | ICD-10-CM

## 2021-05-05 DIAGNOSIS — I63.411 CEREBROVASCULAR ACCIDENT (CVA) DUE TO EMBOLISM OF RIGHT MIDDLE CEREBRAL ARTERY (HCC): ICD-10-CM

## 2021-05-05 LAB — INR PPP: 3

## 2021-05-05 NOTE — PROGRESS NOTES
Anticoagulation Clinic Progress Note    Anticoagulation Summary  As of 5/5/2021    INR goal:  3.0-3.5   TTR:  60.4 % (2.6 y)   INR used for dosing:  3.00 (5/5/2021)   Warfarin maintenance plan:  7.5 mg every Mon, Wed, Fri; 5 mg all other days   Weekly warfarin total:  42.5 mg   No change documented:  Natty Sullivan   Plan last modified:  Vasquez Niño RP (12/16/2020)   Next INR check:  5/12/2021   Priority:  High   Target end date:  Indefinite    Indications    Hx of aortic valve replacement  mechanical [Z95.2] [Z95.2]  Atrial fibrillation (CMS/HCC) [I48.91]  Cerebrovascular accident (CVA) due to embolism of right middle cerebral artery (CMS/HCC) [I63.411]             Anticoagulation Episode Summary     INR check location:      Preferred lab:      Send INR reminders to:   [facility]  POOL    Comments:  New INR goal 3 - 3.5 (see 1/2020 hospitalization for CVA)      Anticoagulation Care Providers     Provider Role Specialty Phone number    Griffin Fried MD Referring Cardiology [phone]          Clinic Interview:  No pertinent clinical findings have been reported.    INR History:  Anticoagulation Monitoring 4/21/2021 4/28/2021 5/5/2021   INR 3.30 3.40 3.00   INR Date 4/21/2021 4/28/2021 5/5/2021   INR Goal 3.0-3.5 3.0-3.5 3.0-3.5   Trend Same Same Same   Last Week Total 42.5 mg 42.5 mg 42.5 mg   Next Week Total 42.5 mg 42.5 mg 42.5 mg   Sun 5 mg 5 mg 5 mg   Mon 7.5 mg 7.5 mg 7.5 mg   Tue 5 mg 5 mg 5 mg   Wed 7.5 mg 7.5 mg 7.5 mg   Thu 5 mg 5 mg 5 mg   Fri 7.5 mg 7.5 mg 7.5 mg   Sat 5 mg 5 mg 5 mg   Visit Report - - -   Some recent data might be hidden       Plan:  1. INR is therapeutic today- see above in Anticoagulation Summary.    Francisco Sequeira to continue their warfarin regimen- see above in Anticoagulation Summary.  2. Follow up in 1 week.  3. Pt has agreed to only be called if INR out of range. They have been instructed to call if any changes in medications, doses, concerns,  etc. Patient expresses understanding and has no further questions at this time.    Natty Sullivan

## 2021-05-09 DIAGNOSIS — E78.5 HYPERLIPIDEMIA, UNSPECIFIED HYPERLIPIDEMIA TYPE: ICD-10-CM

## 2021-05-10 RX ORDER — ATORVASTATIN CALCIUM 40 MG/1
TABLET, FILM COATED ORAL
Qty: 90 TABLET | Refills: 3 | Status: ON HOLD | OUTPATIENT
Start: 2021-05-10 | End: 2021-11-04

## 2021-05-12 ENCOUNTER — ANTICOAGULATION VISIT (OUTPATIENT)
Dept: PHARMACY | Facility: HOSPITAL | Age: 86
End: 2021-05-12

## 2021-05-12 DIAGNOSIS — I63.411 CEREBROVASCULAR ACCIDENT (CVA) DUE TO EMBOLISM OF RIGHT MIDDLE CEREBRAL ARTERY (HCC): ICD-10-CM

## 2021-05-12 DIAGNOSIS — Z95.2 HX OF AORTIC VALVE REPLACEMENT, MECHANICAL: Primary | ICD-10-CM

## 2021-05-12 LAB — INR PPP: 3

## 2021-05-12 NOTE — PROGRESS NOTES
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    INR History:  Anticoagulation Monitoring 4/28/2021 5/5/2021 5/12/2021   INR 3.40 3.00 3.00   INR Date 4/28/2021 5/5/2021 5/12/2021   INR Goal 3.0-3.5 3.0-3.5 3.0-3.5   Trend Same Same Same   Last Week Total 42.5 mg 42.5 mg 42.5 mg   Next Week Total 42.5 mg 42.5 mg 42.5 mg   Sun 5 mg 5 mg 5 mg   Mon 7.5 mg 7.5 mg 7.5 mg   Tue 5 mg 5 mg 5 mg   Wed 7.5 mg 7.5 mg 7.5 mg   Thu 5 mg 5 mg 5 mg   Fri 7.5 mg 7.5 mg 7.5 mg   Sat 5 mg 5 mg 5 mg   Visit Report - - -   Some recent data might be hidden       Plan:  1. INR is therapeutic today- see above in Anticoagulation Summary.    Francisco Sequeira to continue their warfarin regimen- see above in Anticoagulation Summary.  2. Follow up in 1 week.  3. Pt has agreed to only be called if INR out of range. They have been instructed to call if any changes in medications, doses, concerns,  etc. Patient expresses understanding and has no further questions at this time.    Natty Sullivan

## 2021-05-19 ENCOUNTER — ANTICOAGULATION VISIT (OUTPATIENT)
Dept: PHARMACY | Facility: HOSPITAL | Age: 86
End: 2021-05-19

## 2021-05-19 DIAGNOSIS — I63.411 CEREBROVASCULAR ACCIDENT (CVA) DUE TO EMBOLISM OF RIGHT MIDDLE CEREBRAL ARTERY (HCC): ICD-10-CM

## 2021-05-19 DIAGNOSIS — Z95.2 HX OF AORTIC VALVE REPLACEMENT, MECHANICAL: Primary | ICD-10-CM

## 2021-05-19 LAB — INR PPP: 3.2

## 2021-05-19 NOTE — PROGRESS NOTES
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    INR History:  Anticoagulation Monitoring 5/5/2021 5/12/2021 5/19/2021   INR 3.00 3.00 3.20   INR Date 5/5/2021 5/12/2021 5/19/2021   INR Goal 3.0-3.5 3.0-3.5 3.0-3.5   Trend Same Same Same   Last Week Total 42.5 mg 42.5 mg 42.5 mg   Next Week Total 42.5 mg 42.5 mg 42.5 mg   Sun 5 mg 5 mg 5 mg   Mon 7.5 mg 7.5 mg 7.5 mg   Tue 5 mg 5 mg 5 mg   Wed 7.5 mg 7.5 mg 7.5 mg   Thu 5 mg 5 mg 5 mg   Fri 7.5 mg 7.5 mg 7.5 mg   Sat 5 mg 5 mg 5 mg   Visit Report - - -   Some recent data might be hidden       Plan:  1. INR is therapeutic today- see above in Anticoagulation Summary.    Francisco Sequeira to continue their warfarin regimen- see above in Anticoagulation Summary.  2. Follow up in 1 week.  3. Pt has agreed to only be called if INR out of range. They have been instructed to call if any changes in medications, doses, concerns,  etc. Patient expresses understanding and has no further questions at this time.    Natty Sullivan

## 2021-05-26 ENCOUNTER — ANTICOAGULATION VISIT (OUTPATIENT)
Dept: PHARMACY | Facility: HOSPITAL | Age: 86
End: 2021-05-26

## 2021-05-26 DIAGNOSIS — Z95.2 HX OF AORTIC VALVE REPLACEMENT, MECHANICAL: Primary | ICD-10-CM

## 2021-05-26 DIAGNOSIS — I63.411 CEREBROVASCULAR ACCIDENT (CVA) DUE TO EMBOLISM OF RIGHT MIDDLE CEREBRAL ARTERY (HCC): ICD-10-CM

## 2021-05-26 LAB — INR PPP: 3

## 2021-05-26 NOTE — PROGRESS NOTES
Anticoagulation Clinic Progress Note    Anticoagulation Summary  As of 5/26/2021    INR goal:  3.0-3.5   TTR:  61.2 % (2.6 y)   INR used for dosing:  3.00 (5/26/2021)   Warfarin maintenance plan:  7.5 mg every Mon, Wed, Fri; 5 mg all other days   Weekly warfarin total:  42.5 mg   No change documented:  Princess Robin   Plan last modified:  Vasquez Niño RPH (12/16/2020)   Next INR check:  6/2/2021   Priority:  High   Target end date:  Indefinite    Indications    Hx of aortic valve replacement  mechanical [Z95.2] [Z95.2]  Atrial fibrillation (CMS/HCC) [I48.91]  Cerebrovascular accident (CVA) due to embolism of right middle cerebral artery (CMS/HCC) [I63.411]             Anticoagulation Episode Summary     INR check location:      Preferred lab:      Send INR reminders to:   [facility]  POOL    Comments:  New INR goal 3 - 3.5 (see 1/2020 hospitalization for CVA)      Anticoagulation Care Providers     Provider Role Specialty Phone number    Griffin Fried MD Referring Cardiology [phone]          Clinic Interview:  No pertinent clinical findings have been reported.    INR History:  Anticoagulation Monitoring 5/12/2021 5/19/2021 5/26/2021   INR 3.00 3.20 3.00   INR Date 5/12/2021 5/19/2021 5/26/2021   INR Goal 3.0-3.5 3.0-3.5 3.0-3.5   Trend Same Same Same   Last Week Total 42.5 mg 42.5 mg 42.5 mg   Next Week Total 42.5 mg 42.5 mg 42.5 mg   Sun 5 mg 5 mg 5 mg   Mon 7.5 mg 7.5 mg 7.5 mg   Tue 5 mg 5 mg 5 mg   Wed 7.5 mg 7.5 mg 7.5 mg   Thu 5 mg 5 mg 5 mg   Fri 7.5 mg 7.5 mg 7.5 mg   Sat 5 mg 5 mg 5 mg   Visit Report - - -   Some recent data might be hidden       Plan:  1. INR is therapeutic today- see above in Anticoagulation Summary.    Francisco Sequeira to continue their warfarin regimen- see above in Anticoagulation Summary.  2. Follow up in 1 weeks  3. Pt has agreed to only be called if INR out of range. They have been instructed to call if any changes in medications, doses, concerns, etc.  Patient expresses understanding and has no further questions at this time.    Princess Robin

## 2021-06-02 ENCOUNTER — OFFICE VISIT (OUTPATIENT)
Dept: FAMILY MEDICINE CLINIC | Facility: CLINIC | Age: 86
End: 2021-06-02

## 2021-06-02 ENCOUNTER — ANTICOAGULATION VISIT (OUTPATIENT)
Dept: PHARMACY | Facility: HOSPITAL | Age: 86
End: 2021-06-02

## 2021-06-02 VITALS
TEMPERATURE: 97.3 F | WEIGHT: 183.6 LBS | SYSTOLIC BLOOD PRESSURE: 136 MMHG | DIASTOLIC BLOOD PRESSURE: 64 MMHG | BODY MASS INDEX: 24.87 KG/M2 | HEIGHT: 72 IN

## 2021-06-02 DIAGNOSIS — E78.5 HYPERLIPIDEMIA, UNSPECIFIED HYPERLIPIDEMIA TYPE: Chronic | ICD-10-CM

## 2021-06-02 DIAGNOSIS — I10 ESSENTIAL HYPERTENSION: Chronic | ICD-10-CM

## 2021-06-02 DIAGNOSIS — Z95.2 HX OF AORTIC VALVE REPLACEMENT, MECHANICAL: Primary | ICD-10-CM

## 2021-06-02 DIAGNOSIS — N28.9 RENAL INSUFFICIENCY: ICD-10-CM

## 2021-06-02 DIAGNOSIS — I48.91 ATRIAL FIBRILLATION, UNSPECIFIED TYPE (HCC): ICD-10-CM

## 2021-06-02 DIAGNOSIS — I63.411 CEREBROVASCULAR ACCIDENT (CVA) DUE TO EMBOLISM OF RIGHT MIDDLE CEREBRAL ARTERY (HCC): ICD-10-CM

## 2021-06-02 DIAGNOSIS — IMO0002 UNCONTROLLED TYPE 2 DIABETES MELLITUS WITH COMPLICATION, WITH LONG-TERM CURRENT USE OF INSULIN: Primary | ICD-10-CM

## 2021-06-02 LAB
HBA1C MFR BLD: 6 %
INR PPP: 3.2

## 2021-06-02 PROCEDURE — 99214 OFFICE O/P EST MOD 30 MIN: CPT | Performed by: NURSE PRACTITIONER

## 2021-06-02 PROCEDURE — 83036 HEMOGLOBIN GLYCOSYLATED A1C: CPT | Performed by: NURSE PRACTITIONER

## 2021-06-02 NOTE — PROGRESS NOTES
Anticoagulation Clinic Progress Note    Anticoagulation Summary  As of 6/2/2021    INR goal:  3.0-3.5   TTR:  61.5 % (2.6 y)   INR used for dosing:  3.20 (6/2/2021)   Warfarin maintenance plan:  7.5 mg every Mon, Wed, Fri; 5 mg all other days   Weekly warfarin total:  42.5 mg   No change documented:  Natty Sullivan   Plan last modified:  Vasquez Niño RPH (12/16/2020)   Next INR check:  6/9/2021   Priority:  High   Target end date:  Indefinite    Indications    Hx of aortic valve replacement  mechanical [Z95.2] [Z95.2]  Atrial fibrillation (CMS/HCC) [I48.91]  Cerebrovascular accident (CVA) due to embolism of right middle cerebral artery (CMS/HCC) [I63.411]             Anticoagulation Episode Summary     INR check location:      Preferred lab:      Send INR reminders to:   [facility] GUT[facility]RE[facility]  POOL    Comments:  New INR goal 3 - 3.5 (see 1/2020 hospitalization for CVA)      Anticoagulation Care Providers     Provider Role Specialty Phone number    Griffin Fried MD Referring Cardiology [phone]          Clinic Interview:  No pertinent clinical findings have been reported.    INR History:  Anticoagulation Monitoring 5/19/2021 5/26/2021 6/2/2021   INR 3.20 3.00 3.20   INR Date 5/19/2021 5/26/2021 6/2/2021   INR Goal 3.0-3.5 3.0-3.5 3.0-3.5   Trend Same Same Same   Last Week Total 42.5 mg 42.5 mg 42.5 mg   Next Week Total 42.5 mg 42.5 mg 42.5 mg   Sun 5 mg 5 mg 5 mg   Mon 7.5 mg 7.5 mg 7.5 mg   Tue 5 mg 5 mg 5 mg   Wed 7.5 mg 7.5 mg 7.5 mg   Thu 5 mg 5 mg 5 mg   Fri 7.5 mg 7.5 mg 7.5 mg   Sat 5 mg 5 mg 5 mg   Visit Report - - -   Some recent data might be hidden       Plan:  1. INR is therapeutic today- see above in Anticoagulation Summary.    Francisco Sequeira to continue their warfarin regimen- see above in Anticoagulation Summary.  2. Follow up in 1 week.  3. Pt has agreed to only be called if INR out of range. They have been instructed to call if any changes in medications, doses, concerns,  etc. Patient expresses understanding and has no further questions at this time.    Natty Sullivan

## 2021-06-02 NOTE — PROGRESS NOTES
"Chief Complaint  Diabetes (Patient is needing his a1c checked ( wore mask and goggles) )    Subjective          Francisco Sequeira presents to [facility]  History of Present Illness  #1 diabetes-patient's last A1c was checked on 3/2/2021 with result of 6.5. Using VGO and using appropriate clicks up to five.  And doing 65 units at night.     #2 hypertension-patient is currently on metoprolol XL 25 mg daily as directed without any side effects blood pressures well controlled today in office.    3.  A. fib-patient is currently on Coumadin 5 mg and digoxin 125 mcg daily as directed without any side effects.  Currently this is being monitored by his cardiologist.    #4 hyperlipidemia-patient is currently on atorvastatin 40 mg daily as directed without any side effects.  His cholesterol was last checked in March 2021 with result of total cholesterol 147 and LDL of 84.    #5 renal insufficiency-patient is currently seeing a specialist.  On Lasix 20 mg daily along with potassium supplementation daily.  No edema noted in bilateral ankles today.    Objective   Vital Signs:   /64   Temp 97.3 °F (36.3 °C)   Ht 182.9 cm (72.01\")   Wt 83.3 kg (183 lb 9.6 oz)   BMI 24.90 kg/m²     Physical Exam  Vitals reviewed.   Constitutional:       General: He is not in acute distress.     Appearance: He is well-developed.   HENT:      Head: Normocephalic.   Cardiovascular:      Rate and Rhythm: Normal rate and regular rhythm.      Heart sounds: Normal heart sounds.   Pulmonary:      Effort: Pulmonary effort is normal.      Breath sounds: Normal breath sounds.   Neurological:      Mental Status: He is alert and oriented to person, place, and time.      Gait: Gait normal.   Psychiatric:         Behavior: Behavior normal.         Thought Content: Thought content normal.         Judgment: Judgment normal.        Result Review :   The following data was reviewed by: LIZA Trinh on 06/02/2021:  CMP  "   CMP 11/15/20 3/2/21   Glucose 81    Glucose  42 (A)   BUN 28 (A) 32 (A)   Creatinine 1.60 (A) 1.61 (A)   eGFR Non  Am 42 (A) 41 (A)   eGFR African Am  50 (A)   Sodium 141 146 (A)   Potassium 4.2 4.5   Chloride 106 108 (A)   Calcium 8.9 9.1   Total Protein  6.9   Albumin 3.80 4.20   Globulin  2.7   Total Bilirubin 0.4 0.5   Alkaline Phosphatase 75 95   AST (SGOT) 22 23   ALT (SGPT) 22 21   (A) Abnormal value       Comments are available for some flowsheets but are not being displayed.           CBC w/diff    CBC w/Diff 11/15/20 3/2/21   WBC 5.12 5.33   RBC 4.88 5.45   Hemoglobin 13.2 14.3   Hematocrit 41.7 45.8   MCV 85.5 84.0   MCH 27.0 26.2 (A)   MCHC 31.7 31.2 (A)   RDW 14.8 14.8   Platelets 122 (A) 134 (A)   Neutrophil Rel % 64.6 CANCELED   Immature Granulocyte Rel % 0.2    Lymphocyte Rel % 25.4 CANCELED   Monocyte Rel % 7.6 CANCELED   Eosinophil Rel % 1.4 CANCELED   Basophil Rel % 0.8    (A) Abnormal value       Comments are available for some flowsheets but are not being displayed.           Lipid Panel    Lipid Panel 3/2/21   Total Cholesterol 147   Triglycerides 162 (A)   HDL Cholesterol 35 (A)   VLDL Cholesterol 28   LDL Cholesterol  84   LDL/HDL Ratio 2.27   (A) Abnormal value       Comments are available for some flowsheets but are not being displayed.           Most Recent A1C    HGBA1C Most Recent 6/2/21   Hemoglobin A1C 6.0           PSA    PSA 3/2/21   PSA 0.105                     Assessment and Plan    Diagnoses and all orders for this visit:    1. Uncontrolled type 2 diabetes mellitus with complication, with long-term current use of insulin (CMS/[facility]) (Primary)  -     POC Glycosylated Hemoglobin (Hb A1C)    2. Essential hypertension    3. Hyperlipidemia, unspecified hyperlipidemia type    4. Atrial fibrillation, unspecified type (CMS/[facility])    5. Renal insufficiency        Follow Up   No follow-ups on file.  Patient was given instructions and counseling regarding his condition or for health  maintenance advice. Please see specific information pulled into the AVS if appropriate.     Cont same with all meds  rto in 6 mons     Mask and googles worn

## 2021-06-07 RX ORDER — WARFARIN SODIUM 5 MG/1
TABLET ORAL
Qty: 110 TABLET | Refills: 0 | Status: SHIPPED | OUTPATIENT
Start: 2021-06-07 | End: 2021-08-09

## 2021-06-07 RX ORDER — INSULIN DEGLUDEC INJECTION 100 U/ML
INJECTION, SOLUTION SUBCUTANEOUS
Qty: 24 PEN | Refills: 3 | Status: ON HOLD | OUTPATIENT
Start: 2021-06-07 | End: 2021-11-04

## 2021-06-09 ENCOUNTER — ANTICOAGULATION VISIT (OUTPATIENT)
Dept: PHARMACY | Facility: HOSPITAL | Age: 86
End: 2021-06-09

## 2021-06-09 DIAGNOSIS — I63.411 CEREBROVASCULAR ACCIDENT (CVA) DUE TO EMBOLISM OF RIGHT MIDDLE CEREBRAL ARTERY (HCC): ICD-10-CM

## 2021-06-09 DIAGNOSIS — Z95.2 HX OF AORTIC VALVE REPLACEMENT, MECHANICAL: Primary | ICD-10-CM

## 2021-06-09 LAB — INR PPP: 3.1

## 2021-06-09 NOTE — PROGRESS NOTES
Anticoagulation Clinic Progress Note    Anticoagulation Summary  As of 6/9/2021    INR goal:  3.0-3.5   TTR:  61.8 % (2.7 y)   INR used for dosing:  3.10 (6/9/2021)   Warfarin maintenance plan:  7.5 mg every Mon, Wed, Fri; 5 mg all other days   Weekly warfarin total:  42.5 mg   No change documented:  Princess Robin   Plan last modified:  Vasquez Niño RPH (12/16/2020)   Next INR check:  6/16/2021   Priority:  High   Target end date:  Indefinite    Indications    Hx of aortic valve replacement  mechanical [Z95.2] [Z95.2]  Atrial fibrillation (CMS/HCC) [I48.91]  Cerebrovascular accident (CVA) due to embolism of right middle cerebral artery (CMS/HCC) [I63.411]             Anticoagulation Episode Summary     INR check location:      Preferred lab:      Send INR reminders to:   [facility] GUTIERREZ  POOL    Comments:  New INR goal 3 - 3.5 (see 1/2020 hospitalization for CVA)      Anticoagulation Care Providers     Provider Role Specialty Phone number    Griffin Fried MD Referring Cardiology [phone]          Clinic Interview:  No pertinent clinical findings have been reported.    INR History:  Anticoagulation Monitoring 5/26/2021 6/2/2021 6/9/2021   INR 3.00 3.20 3.10   INR Date 5/26/2021 6/2/2021 6/9/2021   INR Goal 3.0-3.5 3.0-3.5 3.0-3.5   Trend Same Same Same   Last Week Total 42.5 mg 42.5 mg 42.5 mg   Next Week Total 42.5 mg 42.5 mg 42.5 mg   Sun 5 mg 5 mg 5 mg   Mon 7.5 mg 7.5 mg 7.5 mg   Tue 5 mg 5 mg 5 mg   Wed 7.5 mg 7.5 mg 7.5 mg   Thu 5 mg 5 mg 5 mg   Fri 7.5 mg 7.5 mg 7.5 mg   Sat 5 mg 5 mg 5 mg   Visit Report - - -   Some recent data might be hidden       Plan:  1. INR is therapeutic today- see above in Anticoagulation Summary.    Francisco Sequeira to continue their warfarin regimen- see above in Anticoagulation Summary.  2. Follow up in 2 weeks  3. Pt has agreed to only be called if INR out of range. They have been instructed to call if any changes in medications, doses, concerns, etc.  Patient expresses understanding and has no further questions at this time.    Princess Robin

## 2021-06-16 ENCOUNTER — ANTICOAGULATION VISIT (OUTPATIENT)
Dept: PHARMACY | Facility: HOSPITAL | Age: 86
End: 2021-06-16

## 2021-06-16 DIAGNOSIS — I63.411 CEREBROVASCULAR ACCIDENT (CVA) DUE TO EMBOLISM OF RIGHT MIDDLE CEREBRAL ARTERY (HCC): ICD-10-CM

## 2021-06-16 DIAGNOSIS — Z95.2 HX OF AORTIC VALVE REPLACEMENT, MECHANICAL: Primary | ICD-10-CM

## 2021-06-16 LAB — INR PPP: 3

## 2021-06-16 NOTE — PROGRESS NOTES
Anticoagulation Clinic Progress Note    Anticoagulation Summary  As of 6/16/2021    INR goal:  3.0-3.5   TTR:  62.1 % (2.7 y)   INR used for dosing:  3.00 (6/16/2021)   Warfarin maintenance plan:  7.5 mg every Mon, Wed, Fri; 5 mg all other days   Weekly warfarin total:  42.5 mg   No change documented:  Angie Sykes   Plan last modified:  Vasquez Niño RPH (12/16/2020)   Next INR check:  6/23/2021   Priority:  High   Target end date:  Indefinite    Indications    Hx of aortic valve replacement  mechanical [Z95.2] [Z95.2]  Atrial fibrillation (CMS/HCC) [I48.91]  Cerebrovascular accident (CVA) due to embolism of right middle cerebral artery (CMS/HCC) [I63.411]             Anticoagulation Episode Summary     INR check location:      Preferred lab:      Send INR reminders to:   [facility]RIS GUTIERREZ  POOL    Comments:  New INR goal 3 - 3.5 (see 1/2020 hospitalization for CVA)      Anticoagulation Care Providers     Provider Role Specialty Phone number    Griffin Fried MD Referring Cardiology [phone]          Clinic Interview:  No pertinent clinical findings have been reported.    INR History:  Anticoagulation Monitoring 6/2/2021 6/9/2021 6/16/2021   INR 3.20 3.10 3.00   INR Date 6/2/2021 6/9/2021 6/16/2021   INR Goal 3.0-3.5 3.0-3.5 3.0-3.5   Trend Same Same Same   Last Week Total 42.5 mg 42.5 mg 42.5 mg   Next Week Total 42.5 mg 42.5 mg 42.5 mg   Sun 5 mg 5 mg 5 mg   Mon 7.5 mg 7.5 mg 7.5 mg   Tue 5 mg 5 mg 5 mg   Wed 7.5 mg 7.5 mg 7.5 mg   Thu 5 mg 5 mg 5 mg   Fri 7.5 mg 7.5 mg 7.5 mg   Sat 5 mg 5 mg 5 mg   Visit Report - - -   Some recent data might be hidden       Plan:  1. INR is therapeutic today- see above in Anticoagulation Summary.    Francisco Sequeira to continue their warfarin regimen- see above in Anticoagulation Summary.  2. Follow up in 1 week  3. Pt has agreed to only be called if INR out of range. They have been instructed to call if any changes in medications, doses, concerns, etc.  Patient expresses understanding and has no further questions at this time.    Angie Sykes

## 2021-06-23 ENCOUNTER — ANTICOAGULATION VISIT (OUTPATIENT)
Dept: PHARMACY | Facility: HOSPITAL | Age: 86
End: 2021-06-23

## 2021-06-23 DIAGNOSIS — I63.411 CEREBROVASCULAR ACCIDENT (CVA) DUE TO EMBOLISM OF RIGHT MIDDLE CEREBRAL ARTERY (HCC): ICD-10-CM

## 2021-06-23 DIAGNOSIS — Z95.2 HX OF AORTIC VALVE REPLACEMENT, MECHANICAL: Primary | ICD-10-CM

## 2021-06-23 DIAGNOSIS — IMO0002 UNCONTROLLED TYPE 2 DIABETES MELLITUS WITH COMPLICATION, WITH LONG-TERM CURRENT USE OF INSULIN: ICD-10-CM

## 2021-06-23 LAB — INR PPP: 3.1

## 2021-06-23 NOTE — PROGRESS NOTES
Anticoagulation Clinic Progress Note    Anticoagulation Summary  As of 6/23/2021    INR goal:  3.0-3.5   TTR:  62.3 % (2.7 y)   INR used for dosing:  3.10 (6/23/2021)   Warfarin maintenance plan:  7.5 mg every Mon, Wed, Fri; 5 mg all other days   Weekly warfarin total:  42.5 mg   No change documented:  Natty Sullivan   Plan last modified:  Vasquez Niño RPH (12/16/2020)   Next INR check:  6/30/2021   Priority:  High   Target end date:  Indefinite    Indications    Hx of aortic valve replacement  mechanical [Z95.2] [Z95.2]  Atrial fibrillation (CMS/HCC) [I48.91]  Cerebrovascular accident (CVA) due to embolism of right middle cerebral artery (CMS/HCC) [I63.411]             Anticoagulation Episode Summary     INR check location:      Preferred lab:      Send INR reminders to:   CHRIS GUTIERREZ  POOL    Comments:  New INR goal 3 - 3.5 (see 1/2020 hospitalization for CVA)      Anticoagulation Care Providers     Provider Role Specialty Phone number    Griffin Fried MD Referring Cardiology [phone]          Clinic Interview:  No pertinent clinical findings have been reported.    INR History:  Anticoagulation Monitoring 6/9/2021 6/16/2021 6/23/2021   INR 3.10 3.00 3.10   INR Date 6/9/2021 6/16/2021 6/23/2021   INR Goal 3.0-3.5 3.0-3.5 3.0-3.5   Trend Same Same Same   Last Week Total 42.5 mg 42.5 mg 42.5 mg   Next Week Total 42.5 mg 42.5 mg 42.5 mg   Sun 5 mg 5 mg 5 mg   Mon 7.5 mg 7.5 mg 7.5 mg   Tue 5 mg 5 mg 5 mg   Wed 7.5 mg 7.5 mg 7.5 mg   Thu 5 mg 5 mg 5 mg   Fri 7.5 mg 7.5 mg 7.5 mg   Sat 5 mg 5 mg 5 mg   Visit Report - - -   Some recent data might be hidden       Plan:  1. INR is therapeutic today- see above in Anticoagulation Summary.    Francisco Sequeira to continue their warfarin regimen- see above in Anticoagulation Summary.  2. Follow up in 1 week.  3. Pt has agreed to only be called if INR out of range. They have been instructed to call if any changes in medications, doses, concerns,  etc. Patient expresses understanding and has no further questions at this time.    Natty Sullivan

## 2021-06-30 ENCOUNTER — ANTICOAGULATION VISIT (OUTPATIENT)
Dept: PHARMACY | Facility: HOSPITAL | Age: 86
End: 2021-06-30

## 2021-06-30 DIAGNOSIS — I63.411 CEREBROVASCULAR ACCIDENT (CVA) DUE TO EMBOLISM OF RIGHT MIDDLE CEREBRAL ARTERY (HCC): ICD-10-CM

## 2021-06-30 DIAGNOSIS — Z95.2 HX OF AORTIC VALVE REPLACEMENT, MECHANICAL: Primary | ICD-10-CM

## 2021-06-30 LAB — INR PPP: 3

## 2021-06-30 NOTE — PROGRESS NOTES
Anticoagulation Clinic Progress Note    Anticoagulation Summary  As of 6/30/2021    INR goal:  3.0-3.5   TTR:  62.6 % (2.7 y)   INR used for dosing:  3.00 (6/30/2021)   Warfarin maintenance plan:  7.5 mg every Mon, Wed, Fri; 5 mg all other days   Weekly warfarin total:  42.5 mg   No change documented:  Princess Robin   Plan last modified:  Vasquez Niño RPH (12/16/2020)   Next INR check:  7/7/2021   Priority:  High   Target end date:  Indefinite    Indications    Hx of aortic valve replacement  mechanical [Z95.2] [Z95.2]  Atrial fibrillation (CMS/HCC) [I48.91]  Cerebrovascular accident (CVA) due to embolism of right middle cerebral artery (CMS/HCC) [I63.411]             Anticoagulation Episode Summary     INR check location:      Preferred lab:      Send INR reminders to:   CHRIS GUTIERREZ  POOL    Comments:  New INR goal 3 - 3.5 (see 1/2020 hospitalization for CVA)      Anticoagulation Care Providers     Provider Role Specialty Phone number    Griffin Fried MD Referring Cardiology [phone]          Clinic Interview:  No pertinent clinical findings have been reported.    INR History:  Anticoagulation Monitoring 6/16/2021 6/23/2021 6/30/2021   INR 3.00 3.10 3.00   INR Date 6/16/2021 6/23/2021 6/30/2021   INR Goal 3.0-3.5 3.0-3.5 3.0-3.5   Trend Same Same Same   Last Week Total 42.5 mg 42.5 mg 42.5 mg   Next Week Total 42.5 mg 42.5 mg 42.5 mg   Sun 5 mg 5 mg 5 mg   Mon 7.5 mg 7.5 mg 7.5 mg   Tue 5 mg 5 mg 5 mg   Wed 7.5 mg 7.5 mg 7.5 mg   Thu 5 mg 5 mg 5 mg   Fri 7.5 mg 7.5 mg 7.5 mg   Sat 5 mg 5 mg 5 mg   Visit Report - - -   Some recent data might be hidden       Plan:  1. INR is therapeutic today- see above in Anticoagulation Summary.    Francisco Sequeira to continue their warfarin regimen- see above in Anticoagulation Summary.  2. Follow up in 1 weeks  3. Pt has agreed to only be called if INR out of range. They have been instructed to call if any changes in medications, doses, concerns, etc.  Patient expresses understanding and has no further questions at this time.    Princess Robin

## 2021-07-06 DIAGNOSIS — IMO0002 UNCONTROLLED TYPE 2 DIABETES MELLITUS WITH COMPLICATION, WITH LONG-TERM CURRENT USE OF INSULIN: ICD-10-CM

## 2021-07-06 RX ORDER — METOPROLOL SUCCINATE 25 MG/1
TABLET, EXTENDED RELEASE ORAL
Qty: 90 TABLET | Refills: 3 | Status: ON HOLD | OUTPATIENT
Start: 2021-07-06 | End: 2021-11-04

## 2021-07-07 ENCOUNTER — ANTICOAGULATION VISIT (OUTPATIENT)
Dept: PHARMACY | Facility: HOSPITAL | Age: 86
End: 2021-07-07

## 2021-07-07 DIAGNOSIS — Z95.2 HX OF AORTIC VALVE REPLACEMENT, MECHANICAL: Primary | ICD-10-CM

## 2021-07-07 DIAGNOSIS — I63.411 CEREBROVASCULAR ACCIDENT (CVA) DUE TO EMBOLISM OF RIGHT MIDDLE CEREBRAL ARTERY (HCC): ICD-10-CM

## 2021-07-07 LAB — INR PPP: 3.2

## 2021-07-07 NOTE — PROGRESS NOTES
Anticoagulation Clinic Progress Note    Anticoagulation Summary  As of 7/7/2021    INR goal:  3.0-3.5   TTR:  62.9 % (2.7 y)   INR used for dosing:  3.20 (7/7/2021)   Warfarin maintenance plan:  7.5 mg every Mon, Wed, Fri; 5 mg all other days   Weekly warfarin total:  42.5 mg   No change documented:  Natty Sullivan   Plan last modified:  Vasquez Niño RPH (12/16/2020)   Next INR check:  7/14/2021   Priority:  High   Target end date:  Indefinite    Indications    Hx of aortic valve replacement  mechanical [Z95.2] [Z95.2]  Atrial fibrillation (CMS/HCC) [I48.91]  Cerebrovascular accident (CVA) due to embolism of right middle cerebral artery (CMS/HCC) [I63.411]             Anticoagulation Episode Summary     INR check location:      Preferred lab:      Send INR reminders to:   CHRIS GUTIERREZ  POOL    Comments:  New INR goal 3 - 3.5 (see 1/2020 hospitalization for CVA)      Anticoagulation Care Providers     Provider Role Specialty Phone number    Griffin Fried MD Referring Cardiology [phone]          Clinic Interview:  No pertinent clinical findings have been reported.    INR History:  Anticoagulation Monitoring 6/23/2021 6/30/2021 7/7/2021   INR 3.10 3.00 3.20   INR Date 6/23/2021 6/30/2021 7/7/2021   INR Goal 3.0-3.5 3.0-3.5 3.0-3.5   Trend Same Same Same   Last Week Total 42.5 mg 42.5 mg 42.5 mg   Next Week Total 42.5 mg 42.5 mg 42.5 mg   Sun 5 mg 5 mg 5 mg   Mon 7.5 mg 7.5 mg 7.5 mg   Tue 5 mg 5 mg 5 mg   Wed 7.5 mg 7.5 mg 7.5 mg   Thu 5 mg 5 mg 5 mg   Fri 7.5 mg 7.5 mg 7.5 mg   Sat 5 mg 5 mg 5 mg   Visit Report - - -   Some recent data might be hidden       Plan:  1. INR is therapeutic today- see above in Anticoagulation Summary.    Francisco Sequeira to continue their warfarin regimen- see above in Anticoagulation Summary.  2. Follow up in 1 week.  3. Pt has agreed to only be called if INR out of range. They have been instructed to call if any changes in medications, doses, concerns,  etc. Patient expresses understanding and has no further questions at this time.    Natty Sullivan

## 2021-07-08 RX ORDER — SUB-Q INSULIN DEVICE, 40 UNIT
EACH MISCELLANEOUS
Qty: 30 EACH | Refills: 11 | Status: SHIPPED | OUTPATIENT
Start: 2021-07-08 | End: 2022-09-20

## 2021-07-09 NOTE — TELEPHONE ENCOUNTER
Caller: Gladys Sequeira    Relationship: Emergency Contact    Best call back number: [phone]    Medication needed:   Requested Prescriptions     Pending Prescriptions Disp Refills   • glucose blood (Accu-Chek Darlene Plus) test strip 100 each 3     Sig: TEST BLOOD SUGAR 3 TO 4 TIMES DAILY       When do you need the refill by: ASAP    What additional details did the patient provide when requesting the medication: PATIENTS STATES HE HAS ENOUGH FOR 3 DAYS    Does the patient have less than a 3 day supply:  [x] Yes  [] No    What is the patient's preferred pharmacy: [facility] [address] - [phone] [facility] [phone] FX

## 2021-07-12 RX ORDER — BLOOD SUGAR DIAGNOSTIC
STRIP MISCELLANEOUS
Qty: 100 EACH | Refills: 3 | Status: SHIPPED | OUTPATIENT
Start: 2021-07-12 | End: 2021-07-13

## 2021-07-13 DIAGNOSIS — IMO0002 UNCONTROLLED TYPE 2 DIABETES MELLITUS WITH COMPLICATION, WITH LONG-TERM CURRENT USE OF INSULIN: Primary | ICD-10-CM

## 2021-07-13 NOTE — TELEPHONE ENCOUNTER
Caller: Gladys Sequeira    Relationship: Emergency Contact    Best call back number:     What is the best time to reach you: ANYTIME    Who are you requesting to speak with (clinical staff, provider,  specific staff member):     Do you know the name of the person who called:     What was the call regarding: WANTS TO SPEAK TO DR BARRERA ASSISTANT.     Do you require a callback: YES

## 2021-07-13 NOTE — TELEPHONE ENCOUNTER
Spoke to Gladys she said that patients test strips were not the right ones sent he needs the [employer] pro brand prescription pending for you to send

## 2021-07-14 ENCOUNTER — ANTICOAGULATION VISIT (OUTPATIENT)
Dept: PHARMACY | Facility: HOSPITAL | Age: 86
End: 2021-07-14

## 2021-07-14 DIAGNOSIS — I63.411 CEREBROVASCULAR ACCIDENT (CVA) DUE TO EMBOLISM OF RIGHT MIDDLE CEREBRAL ARTERY (HCC): ICD-10-CM

## 2021-07-14 DIAGNOSIS — Z95.2 HX OF AORTIC VALVE REPLACEMENT, MECHANICAL: Primary | ICD-10-CM

## 2021-07-14 LAB — INR PPP: 3.1

## 2021-07-14 NOTE — PROGRESS NOTES
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    INR History:  Anticoagulation Monitoring 6/30/2021 7/7/2021 7/14/2021   INR 3.00 3.20 3.10   INR Date 6/30/2021 7/7/2021 7/14/2021   INR Goal 3.0-3.5 3.0-3.5 3.0-3.5   Trend Same Same Same   Last Week Total 42.5 mg 42.5 mg 42.5 mg   Next Week Total 42.5 mg 42.5 mg 42.5 mg   Sun 5 mg 5 mg 5 mg   Mon 7.5 mg 7.5 mg 7.5 mg   Tue 5 mg 5 mg 5 mg   Wed 7.5 mg 7.5 mg 7.5 mg   Thu 5 mg 5 mg 5 mg   Fri 7.5 mg 7.5 mg 7.5 mg   Sat 5 mg 5 mg 5 mg   Visit Report - - -   Some recent data might be hidden       Plan:  1. INR is therapeutic today- see above in Anticoagulation Summary.    Francisco Sequeira to continue their warfarin regimen- see above in Anticoagulation Summary.  2. Follow up in 1 weeks  3. Pt has agreed to only be called if INR out of range. They have been instructed to call if any changes in medications, doses, concerns, etc.  Patient expresses understanding and has no further questions at this time.    Princess Robin

## 2021-07-15 ENCOUNTER — APPOINTMENT (OUTPATIENT)
Dept: GENERAL RADIOLOGY | Facility: HOSPITAL | Age: 86
End: 2021-07-15

## 2021-07-15 ENCOUNTER — HOSPITAL ENCOUNTER (EMERGENCY)
Facility: HOSPITAL | Age: 86
Discharge: HOME | End: 2021-07-15
Attending: EMERGENCY MEDICINE | Admitting: EMERGENCY MEDICINE

## 2021-07-15 VITALS
HEART RATE: 55 BPM | OXYGEN SATURATION: 100 % | DIASTOLIC BLOOD PRESSURE: 69 MMHG | RESPIRATION RATE: 16 BRPM | SYSTOLIC BLOOD PRESSURE: 143 MMHG | TEMPERATURE: 98.7 F

## 2021-07-15 DIAGNOSIS — E11.649 HYPOGLYCEMIC EPISODE IN PATIENT WITH DIABETES MELLITUS (HCC): Primary | ICD-10-CM

## 2021-07-15 LAB
ALBUMIN SERPL-MCNC: 3.7 G/DL (ref 3.5–5.2)
ALBUMIN/GLOB SERPL: 1.2 G/DL
ALP SERPL-CCNC: 89 U/L (ref 39–117)
ALT SERPL W P-5'-P-CCNC: 18 U/L (ref 1–41)
ANION GAP SERPL CALCULATED.3IONS-SCNC: 11.5 MMOL/L (ref 5–15)
AST SERPL-CCNC: 18 U/L (ref 1–40)
BASOPHILS # BLD AUTO: 0.03 10*3/MM3 (ref 0–0.2)
BASOPHILS NFR BLD AUTO: 0.5 % (ref 0–1.5)
BILIRUB SERPL-MCNC: 0.4 MG/DL (ref 0–1.2)
BUN SERPL-MCNC: 33 MG/DL (ref 8–23)
BUN/CREAT SERPL: 18.6 (ref 7–25)
CALCIUM SPEC-SCNC: 8.9 MG/DL (ref 8.6–10.5)
CHLORIDE SERPL-SCNC: 103 MMOL/L (ref 98–107)
CO2 SERPL-SCNC: 24.5 MMOL/L (ref 22–29)
CREAT SERPL-MCNC: 1.77 MG/DL (ref 0.76–1.27)
DEPRECATED RDW RBC AUTO: 45.9 FL (ref 37–54)
DIGOXIN SERPL-MCNC: 1.1 NG/ML (ref 0.6–1.2)
EOSINOPHIL # BLD AUTO: 0.03 10*3/MM3 (ref 0–0.4)
EOSINOPHIL NFR BLD AUTO: 0.5 % (ref 0.3–6.2)
ERYTHROCYTE [DISTWIDTH] IN BLOOD BY AUTOMATED COUNT: 15.3 % (ref 12.3–15.4)
GFR SERPL CREATININE-BSD FRML MDRD: 37 ML/MIN/1.73
GLOBULIN UR ELPH-MCNC: 3 GM/DL
GLUCOSE SERPL-MCNC: 181 MG/DL (ref 65–99)
HCT VFR BLD AUTO: 36.3 % (ref 37.5–51)
HGB BLD-MCNC: 11 G/DL (ref 13–17.7)
HOLD SPECIMEN: NORMAL
HOLD SPECIMEN: NORMAL
IMM GRANULOCYTES # BLD AUTO: 0.02 10*3/MM3 (ref 0–0.05)
IMM GRANULOCYTES NFR BLD AUTO: 0.3 % (ref 0–0.5)
LYMPHOCYTES # BLD AUTO: 0.67 10*3/MM3 (ref 0.7–3.1)
LYMPHOCYTES NFR BLD AUTO: 10.6 % (ref 19.6–45.3)
MAGNESIUM SERPL-MCNC: 2.5 MG/DL (ref 1.6–2.4)
MCH RBC QN AUTO: 25 PG (ref 26.6–33)
MCHC RBC AUTO-ENTMCNC: 30.3 G/DL (ref 31.5–35.7)
MCV RBC AUTO: 82.5 FL (ref 79–97)
MONOCYTES # BLD AUTO: 0.44 10*3/MM3 (ref 0.1–0.9)
MONOCYTES NFR BLD AUTO: 7 % (ref 5–12)
NEUTROPHILS NFR BLD AUTO: 5.13 10*3/MM3 (ref 1.7–7)
NEUTROPHILS NFR BLD AUTO: 81.1 % (ref 42.7–76)
NRBC BLD AUTO-RTO: 0 /100 WBC (ref 0–0.2)
NT-PROBNP SERPL-MCNC: 757.8 PG/ML (ref 0–1800)
PLATELET # BLD AUTO: 119 10*3/MM3 (ref 140–450)
PMV BLD AUTO: 12 FL (ref 6–12)
POTASSIUM SERPL-SCNC: 4.7 MMOL/L (ref 3.5–5.2)
PROT SERPL-MCNC: 6.7 G/DL (ref 6–8.5)
RBC # BLD AUTO: 4.4 10*6/MM3 (ref 4.14–5.8)
SODIUM SERPL-SCNC: 139 MMOL/L (ref 136–145)
TROPONIN T SERPL-MCNC: 0.02 NG/ML (ref 0–0.03)
WBC # BLD AUTO: 6.32 10*3/MM3 (ref 3.4–10.8)
WHOLE BLOOD HOLD SPECIMEN: NORMAL

## 2021-07-15 PROCEDURE — 93010 ELECTROCARDIOGRAM REPORT: CPT | Performed by: INTERNAL MEDICINE

## 2021-07-15 PROCEDURE — 80162 ASSAY OF DIGOXIN TOTAL: CPT

## 2021-07-15 PROCEDURE — 71046 X-RAY EXAM CHEST 2 VIEWS: CPT

## 2021-07-15 PROCEDURE — 36415 COLL VENOUS BLD VENIPUNCTURE: CPT

## 2021-07-15 PROCEDURE — 80053 COMPREHEN METABOLIC PANEL: CPT

## 2021-07-15 PROCEDURE — 85025 COMPLETE CBC W/AUTO DIFF WBC: CPT

## 2021-07-15 PROCEDURE — 93005 ELECTROCARDIOGRAM TRACING: CPT

## 2021-07-15 PROCEDURE — 83735 ASSAY OF MAGNESIUM: CPT

## 2021-07-15 PROCEDURE — 84484 ASSAY OF TROPONIN QUANT: CPT

## 2021-07-15 PROCEDURE — 93005 ELECTROCARDIOGRAM TRACING: CPT | Performed by: EMERGENCY MEDICINE

## 2021-07-15 PROCEDURE — 83880 ASSAY OF NATRIURETIC PEPTIDE: CPT | Performed by: EMERGENCY MEDICINE

## 2021-07-15 PROCEDURE — 99283 EMERGENCY DEPT VISIT LOW MDM: CPT

## 2021-07-15 RX ORDER — SODIUM CHLORIDE 0.9 % (FLUSH) 0.9 %
10 SYRINGE (ML) INJECTION AS NEEDED
Status: DISCONTINUED | OUTPATIENT
Start: 2021-07-15 | End: 2021-07-16 | Stop reason: HOSPADM

## 2021-07-15 NOTE — ED NOTES
Pt to ER due due to leg swelling, bilat.  Pt is on lasix.  Pt also told his wife he just doesn't feel right today.  Pt is on Coumadin. Wife states pt blood sugar was 50 earlier but patient ate and it came up to 72.  Pt also removed his Veego Insulin device.  Mask placed on patient in triage.  Triage RN wearing mask throughout encounter.       Bear Bullock, RN  07/15/21 7830

## 2021-07-16 LAB — QT INTERVAL: 421 MS

## 2021-07-16 NOTE — ED PROVIDER NOTES
" EMERGENCY DEPARTMENT ENCOUNTER    Room Number:  21/21  Date of encounter:  7/15/2021  PCP: Rubin Hinkle APRN  Historian: Patient and wife      HPI:  Chief Complaint: \"I was not acting right\"  A complete HPI/ROS/PMH/PSH/SH/FH are unobtainable due to: N/A    Context: Francisco Sequeira is a 85 y.o. male who presents to the ED c/o feeling confused and having tingling on the right side.  His wife noticed this when she came home from work around 5 PM.  She checked his blood sugar around 515 and it was 50.  She had the patient eat some frosting in his glucose came up and his symptoms resolved.  Neither the patient nor his spouse were aware for how long his sugar was low.  Currently, he states he feels well and is back to his baseline.  Denies headaches.  He denies any numbness or tingling in his extremities.  No unilateral weakness.  No recent illnesses-no nausea, vomiting or diarrhea.  No recent fevers or chills.      The patient was placed in a mask in triage, hand hygiene was performed before and after my interaction with the patient.  I wore a mask, safety glasses and gloves during my entire interaction with the patient.    PAST MEDICAL HISTORY  Active Ambulatory Problems     Diagnosis Date Noted   • Atrial fibrillation (CMS/[facility])    • Hypertension    • Atopic rhinitis 03/21/2016   • Gastroesophageal reflux disease 03/21/2016   • Hyperlipidemia 03/21/2016   • Uncontrolled type 2 diabetes mellitus with complication, with long-term current use of insulin (CMS/[facility]) 03/21/2016   • Renal insufficiency 03/31/2017   • Low testosterone 04/03/2017   • Medicare annual wellness visit, subsequent 10/30/2017   • Hx of aortic valve replacement, mechanical [Z95.2] 09/19/2018   • Screening for iron deficiency anemia 11/01/2018   • Stroke-like symptom 01/13/2020   • Kidney carcinoma (CMS/[facility]) 01/13/2020   • CKD (chronic kidney disease) stage 3, GFR 30-59 ml/min (CMS/[facility]) 01/13/2020   • BYRON (acute kidney injury) (CMS/[facility]) 01/14/2020 "   • Acute cerebral infarction (CMS/[facility]) 01/14/2020   • Cerebrovascular accident (CVA) due to embolism of right middle cerebral artery (CMS/[facility]) 04/09/2020     Resolved Ambulatory Problems     Diagnosis Date Noted   • Cardiomyopathy (CMS/[facility])    • Uncontrolled type 2 diabetes mellitus (CMS/[facility]) 03/21/2016   • Poison ivy 09/06/2016   • Low testosterone 04/07/2017   • Sinusitis 05/01/2018   • Hx of mitral valve replacement with mechanical valve [Z95.2] 09/19/2018     Past Medical History:   Diagnosis Date   • Abnormal electrocardiogram    • Allergic rhinitis    • Aortic valve insufficiency    • Ascending aortic aneurysm (CMS/[facility])    • Bacteremia    • Calcific aortic stenosis of bicuspid valve    • Cardiac arrest (CMS/[facility])    • Contact dermatitis due to poison ivy    • Diabetes mellitus (CMS/[facility])    • Elbow fracture    • Esophageal reflux    • GERD (gastroesophageal reflux disease)    • Head injury    • Hyperglycemia    • Leg swelling    • Localized swelling of both lower legs    • Nasal congestion    • Nasal drainage    • Nonischemic cardiomyopathy (CMS/[facility])    • Palpitations    • Pedal edema    • Pleural effusion on left    • Renal insufficiency syndrome    • Renal oncocytoma    • Seasonal allergic reaction    • Syncope    • Type 2 diabetes mellitus (CMS/[facility])    • Vision changes    • Visual field defect          PAST SURGICAL HISTORY  Past Surgical History:   Procedure Laterality Date   • AORTIC VALVE REPAIR/REPLACEMENT     • ASCENDING AORTIC ANEURYSM REPAIR W/ MECHANICAL AORTIC VALVE REPLACEMENT     • EYE SURGERY  12/09/2020    cataract surgery    • NEPHRECTOMY     • OTHER SURGICAL HISTORY      elbow surgery   • PROSTATE SURGERY     • THORACENTESIS Left     diagnostic         FAMILY HISTORY  Family History   Problem Relation Age of Onset   • Cancer Mother         colon   • Cancer Brother         colon         SOCIAL HISTORY  Social History     Socioeconomic History   • Marital status:      Spouse name: Not on  file   • Number of children: Not on file   • Years of education: Not on file   • Highest education level: Not on file   Tobacco Use   • Smoking status: Former Smoker   • Smokeless tobacco: Former User   • Tobacco comment: caffeine use   Substance and Sexual Activity   • Alcohol use: Yes     Comment: occasional   • Drug use: No   • Sexual activity: Defer         ALLERGIES  Other, Penicillins, and Percocet  [oxycodone-acetaminophen]        REVIEW OF SYSTEMS  Review of Systems   Constitutional: Negative for chills and fever.   Respiratory: Negative for chest tightness and shortness of breath.    Cardiovascular: Negative for chest pain.   Gastrointestinal: Negative for abdominal pain, diarrhea, nausea and vomiting.   Neurological:        Confusion, right-sided tingling-resolved        All systems reviewed and negative except for those discussed in HPI.       PHYSICAL EXAM    I have reviewed the triage vital signs and nursing notes.    ED Triage Vitals   Temp Heart Rate Resp BP SpO2   07/15/21 1901 07/15/21 1901 07/15/21 1901 07/15/21 2025 07/15/21 1901   98.7 °F (37.1 °C) 76 16 122/80 96 %      Temp src Heart Rate Source Patient Position BP Location FiO2 (%)   -- -- -- -- --              Physical Exam   Constitutional: Pt. is oriented to person, place, and time and well-developed, well-nourished, and in no distress.   HENT: Normocephalic and atraumatic. Oropharynx moist/nonerythematous.  Neck: Normal range of motion. Neck supple. No JVD present.   Cardiovascular: Normal rate, regular rhythm and normal heart sounds. Exam reveals no gallop and no friction rub.   No murmur heard.  Pulmonary/Chest: Effort normal and breath sounds normal. No stridor. No respiratory distress. No wheezes, no rales.   Abdominal: Soft. Bowel sounds are normal. No distension. There is no tenderness. There is no rebound and no guarding.   Musculoskeletal: Normal range of motion.  Trace lower extremity edema, tenderness or deformity.    Neurological: Pt. is alert and oriented to person, place, and time.  Cranial nerves are intact.  He has no focal neurologic deficits  Skin: Skin is warm and dry. No rash noted. Pt. is not diaphoretic. No erythema.   Psychiatric: Mood, affect and judgment normal.  He is pleasant and cooperative.  Nursing note and vitals reviewed.        LAB RESULTS  Recent Results (from the past 24 hour(s))   Digoxin Level    Collection Time: 07/15/21  8:29 PM    Specimen: Blood   Result Value Ref Range    Digoxin 1.10 0.60 - 1.20 ng/mL   Comprehensive Metabolic Panel    Collection Time: 07/15/21  8:29 PM    Specimen: Blood   Result Value Ref Range    Glucose 181 (H) 65 - 99 mg/dL    BUN 33 (H) 8 - 23 mg/dL    Creatinine 1.77 (H) 0.76 - 1.27 mg/dL    Sodium 139 136 - 145 mmol/L    Potassium 4.7 3.5 - 5.2 mmol/L    Chloride 103 98 - 107 mmol/L    CO2 24.5 22.0 - 29.0 mmol/L    Calcium 8.9 8.6 - 10.5 mg/dL    Total Protein 6.7 6.0 - 8.5 g/dL    Albumin 3.70 3.50 - 5.20 g/dL    ALT (SGPT) 18 1 - 41 U/L    AST (SGOT) 18 1 - 40 U/L    Alkaline Phosphatase 89 39 - 117 U/L    Total Bilirubin 0.4 0.0 - 1.2 mg/dL    eGFR Non African Amer 37 (L) >60 mL/min/1.73    Globulin 3.0 gm/dL    A/G Ratio 1.2 g/dL    BUN/Creatinine Ratio 18.6 7.0 - 25.0    Anion Gap 11.5 5.0 - 15.0 mmol/L   Troponin    Collection Time: 07/15/21  8:29 PM    Specimen: Blood   Result Value Ref Range    Troponin T 0.017 0.000 - 0.030 ng/mL   Magnesium    Collection Time: 07/15/21  8:29 PM    Specimen: Blood   Result Value Ref Range    Magnesium 2.5 (H) 1.6 - 2.4 mg/dL   Green Top (Gel)    Collection Time: 07/15/21  8:29 PM   Result Value Ref Range    Extra Tube Hold for add-ons.    Lavender Top    Collection Time: 07/15/21  8:29 PM   Result Value Ref Range    Extra Tube hold for add-on    Gold Top - SST    Collection Time: 07/15/21  8:29 PM   Result Value Ref Range    Extra Tube Hold for add-ons.    CBC Auto Differential    Collection Time: 07/15/21  8:29 PM    Specimen:  Blood   Result Value Ref Range    WBC 6.32 3.40 - 10.80 10*3/mm3    RBC 4.40 4.14 - 5.80 10*6/mm3    Hemoglobin 11.0 (L) 13.0 - 17.7 g/dL    Hematocrit 36.3 (L) 37.5 - 51.0 %    MCV 82.5 79.0 - 97.0 fL    MCH 25.0 (L) 26.6 - 33.0 pg    MCHC 30.3 (L) 31.5 - 35.7 g/dL    RDW 15.3 12.3 - 15.4 %    RDW-SD 45.9 37.0 - 54.0 fl    MPV 12.0 6.0 - 12.0 fL    Platelets 119 (L) 140 - 450 10*3/mm3    Neutrophil % 81.1 (H) 42.7 - 76.0 %    Lymphocyte % 10.6 (L) 19.6 - 45.3 %    Monocyte % 7.0 5.0 - 12.0 %    Eosinophil % 0.5 0.3 - 6.2 %    Basophil % 0.5 0.0 - 1.5 %    Immature Grans % 0.3 0.0 - 0.5 %    Neutrophils, Absolute 5.13 1.70 - 7.00 10*3/mm3    Lymphocytes, Absolute 0.67 (L) 0.70 - 3.10 10*3/mm3    Monocytes, Absolute 0.44 0.10 - 0.90 10*3/mm3    Eosinophils, Absolute 0.03 0.00 - 0.40 10*3/mm3    Basophils, Absolute 0.03 0.00 - 0.20 10*3/mm3    Immature Grans, Absolute 0.02 0.00 - 0.05 10*3/mm3    nRBC 0.0 0.0 - 0.2 /100 WBC   BNP    Collection Time: 07/15/21  8:29 PM    Specimen: Blood   Result Value Ref Range    proBNP 757.8 0.0-1,800.0 pg/mL   ECG 12 Lead    Collection Time: 07/15/21  9:22 PM   Result Value Ref Range    QT Interval 421 ms       Ordered the above labs and independently reviewed the results.        RADIOLOGY  XR Chest 2 View    Result Date: 7/15/2021  XR CHEST 2 VW-  HISTORY:  Leg swelling, generalized weakness.  COMPARISON:  Chest radiograph 01/13/2020  FINDINGS:  2 views of the chest were obtained.  Support Devices:  None. Cardiac Silhouette/Mediastinum/Latanya:  The cardiac silhouette is upper normal in size and is not significantly changed. There is a cardiac valve prosthesis. There is calcific aortic atherosclerosis. There is central pulmonary venous congestion.. Lungs/Pleural Spaces:  There is no focal consolidation. There are trace bilateral pleural effusions. Chest Wall/Diaphragm/Upper Abdomen:  Median sternotomy wires are intact. There is multilevel degenerative disc disease. There are surgical  clips in the right upper quadrant.   CONCLUSION(S):   1.  Central pulmonary venous congestion without overt pulmonary edema. Trace bilateral pleural effusions.  This report was finalized on 7/15/2021 9:25 PM by Dr. Sanjuanita Maya M.D.        I ordered the above noted radiological studies. Reviewed by me and discussed with radiologist.  See dictation for official radiology interpretation.      PROCEDURES    Procedures      MEDICATIONS GIVEN IN ER    Medications   sodium chloride 0.9 % flush 10 mL (has no administration in time range)         PROGRESS, DATA ANALYSIS, CONSULTS, AND MEDICAL DECISION MAKING    Any/all labs have been independently reviewed by me.  Any/all radiology studies have been reviewed by me and discussed with radiologist dictating the report.   EKG's independently viewed and interpreted by me.  Discussion below represents my analysis of pertinent findings related to patient's condition, differential diagnosis, treatment plan and final disposition.      ED Course as of Jul 15 2216   Thu Jul 15, 2021   2132 Chest x-ray shows pulmonary venous congestion but no overt edema.    [WC]   2133 EKG performed at 2122 shows atrial fibrillation with a heart of 65 bpm.  QRS complexes and ST-T segments are unremarkable.  There is no significant change when compared to 1/13/2020.    [WC]   2153 proBNP: 757.8 [WC]   2214 Patient states he is feeling fine at the present time is anxious to go home.    [WC]      ED Course User Index  [WC] Wesly Currie MD       AS OF 22:16 EDT VITALS:    BP - 122/80  HR - 65  TEMP - 98.7 °F (37.1 °C)  02 SATS - 100%        DIAGNOSIS  Final diagnoses:   Hypoglycemic episode in patient with diabetes mellitus (CMS/[facility])         DISPOSITION  Discharged           Wesly Currie MD  07/15/21 2216

## 2021-07-16 NOTE — ED NOTES
Pt reports he had tingling sensation on his right arm/leg that lasted about an hr around 5-6pm. States he had no other sx at this time. Pt wife states BG was around 50 but came up after eating. Says he was still a little out of it after BG  came back up. This rn wearing mask and goggles. Pt wearing mask during encounter.        Vita Pittman, VITALIY  07/15/21 2122

## 2021-07-21 ENCOUNTER — ANTICOAGULATION VISIT (OUTPATIENT)
Dept: PHARMACY | Facility: HOSPITAL | Age: 86
End: 2021-07-21

## 2021-07-21 DIAGNOSIS — I63.411 CEREBROVASCULAR ACCIDENT (CVA) DUE TO EMBOLISM OF RIGHT MIDDLE CEREBRAL ARTERY (HCC): ICD-10-CM

## 2021-07-21 DIAGNOSIS — Z95.2 HX OF AORTIC VALVE REPLACEMENT, MECHANICAL: Primary | ICD-10-CM

## 2021-07-21 LAB — INR PPP: 3

## 2021-07-21 NOTE — PROGRESS NOTES
Anticoagulation Clinic Progress Note    Anticoagulation Summary  As of 7/21/2021    INR goal:  3.0-3.5   TTR:  63.4 % (2.8 y)   INR used for dosing:  3.00 (7/21/2021)   Warfarin maintenance plan:  7.5 mg every Mon, Wed, Fri; 5 mg all other days   Weekly warfarin total:  42.5 mg   No change documented:  Princess Robin   Plan last modified:  Vasquez Niño RPH (12/16/2020)   Next INR check:  7/28/2021   Priority:  High   Target end date:  Indefinite    Indications    Hx of aortic valve replacement  mechanical [Z95.2] [Z95.2]  Atrial fibrillation (CMS/HCC) [I48.91]  Cerebrovascular accident (CVA) due to embolism of right middle cerebral artery (CMS/HCC) [I63.411]             Anticoagulation Episode Summary     INR check location:      Preferred lab:      Send INR reminders to:   CHRIS GUTIERREZ  POOL    Comments:  New INR goal 3 - 3.5 (see 1/2020 hospitalization for CVA)      Anticoagulation Care Providers     Provider Role Specialty Phone number    Griffin Fried MD Referring Cardiology [phone]          Clinic Interview:  No pertinent clinical findings have been reported.    INR History:  Anticoagulation Monitoring 7/7/2021 7/14/2021 7/21/2021   INR 3.20 3.10 3.00   INR Date 7/7/2021 7/14/2021 7/21/2021   INR Goal 3.0-3.5 3.0-3.5 3.0-3.5   Trend Same Same Same   Last Week Total 42.5 mg 42.5 mg 42.5 mg   Next Week Total 42.5 mg 42.5 mg 42.5 mg   Sun 5 mg 5 mg 5 mg   Mon 7.5 mg 7.5 mg 7.5 mg   Tue 5 mg 5 mg 5 mg   Wed 7.5 mg 7.5 mg 7.5 mg   Thu 5 mg 5 mg 5 mg   Fri 7.5 mg 7.5 mg 7.5 mg   Sat 5 mg 5 mg 5 mg   Visit Report - - -   Some recent data might be hidden       Plan:  1. INR is therapeutic today- see above in Anticoagulation Summary.    Francisco Sequeira to continue their warfarin regimen- see above in Anticoagulation Summary.  2. Follow up in 2 weeks  3. Pt has agreed to only be called if INR out of range. They have been instructed to call if any changes in medications, doses, concerns, etc.  Patient expresses understanding and has no further questions at this time.    Princess Robin

## 2021-07-28 ENCOUNTER — ANTICOAGULATION VISIT (OUTPATIENT)
Dept: PHARMACY | Facility: HOSPITAL | Age: 86
End: 2021-07-28

## 2021-07-28 DIAGNOSIS — Z95.2 HX OF AORTIC VALVE REPLACEMENT, MECHANICAL: Primary | ICD-10-CM

## 2021-07-28 DIAGNOSIS — I63.411 CEREBROVASCULAR ACCIDENT (CVA) DUE TO EMBOLISM OF RIGHT MIDDLE CEREBRAL ARTERY (HCC): ICD-10-CM

## 2021-07-28 LAB — INR PPP: 3.1

## 2021-07-28 NOTE — PROGRESS NOTES
Anticoagulation Clinic Progress Note    Anticoagulation Summary  As of 7/28/2021    INR goal:  3.0-3.5   TTR:  63.6 % (2.8 y)   INR used for dosing:  3.10 (7/28/2021)   Warfarin maintenance plan:  7.5 mg every Mon, Wed, Fri; 5 mg all other days   Weekly warfarin total:  42.5 mg   No change documented:  Angie Sykes   Plan last modified:  Vasquez Niño, PharmD (12/16/2020)   Next INR check:  8/4/2021   Priority:  High   Target end date:  Indefinite    Indications    Hx of aortic valve replacement  mechanical [Z95.2] [Z95.2]  Atrial fibrillation (CMS/HCC) [I48.91]  Cerebrovascular accident (CVA) due to embolism of right middle cerebral artery (CMS/HCC) [I63.411]             Anticoagulation Episode Summary     INR check location:      Preferred lab:      Send INR reminders to:   [facility]  POOL    Comments:  New INR goal 3 - 3.5 (see 1/2020 hospitalization for CVA)      Anticoagulation Care Providers     Provider Role Specialty Phone number    Griffin Fried MD Referring Cardiology [phone]          Clinic Interview:  No pertinent clinical findings have been reported.    INR History:  Anticoagulation Monitoring 7/14/2021 7/21/2021 7/28/2021   INR 3.10 3.00 3.10   INR Date 7/14/2021 7/21/2021 7/28/2021   INR Goal 3.0-3.5 3.0-3.5 3.0-3.5   Trend Same Same Same   Last Week Total 42.5 mg 42.5 mg 42.5 mg   Next Week Total 42.5 mg 42.5 mg 42.5 mg   Sun 5 mg 5 mg 5 mg   Mon 7.5 mg 7.5 mg 7.5 mg   Tue 5 mg 5 mg 5 mg   Wed 7.5 mg 7.5 mg 7.5 mg   Thu 5 mg 5 mg 5 mg   Fri 7.5 mg 7.5 mg 7.5 mg   Sat 5 mg 5 mg 5 mg   Visit Report - - -   Some recent data might be hidden       Plan:  1. INR is therapeutic today- see above in Anticoagulation Summary.    Francisco Sequeira to continue their warfarin regimen- see above in Anticoagulation Summary.  2. Follow up in 1 week  3. Pt has agreed to only be called if INR out of range. They have been instructed to call if any changes in medications, doses, concerns,  etc. Patient expresses understanding and has no further questions at this time.    Angie Sykes

## 2021-07-30 ENCOUNTER — OFFICE VISIT (OUTPATIENT)
Dept: CARDIOLOGY | Facility: CLINIC | Age: 86
End: 2021-07-30

## 2021-07-30 VITALS
OXYGEN SATURATION: 99 % | SYSTOLIC BLOOD PRESSURE: 140 MMHG | BODY MASS INDEX: 25.73 KG/M2 | HEART RATE: 60 BPM | WEIGHT: 190 LBS | DIASTOLIC BLOOD PRESSURE: 68 MMHG | HEIGHT: 72 IN

## 2021-07-30 DIAGNOSIS — I48.91 ATRIAL FIBRILLATION, UNSPECIFIED TYPE (HCC): Primary | ICD-10-CM

## 2021-07-30 PROCEDURE — 99213 OFFICE O/P EST LOW 20 MIN: CPT | Performed by: INTERNAL MEDICINE

## 2021-07-30 NOTE — PROGRESS NOTES
"      CARDIOLOGY    Griffin Fried MD    ENCOUNTER DATE:  07/30/2021    Francisco Sequeira / 85 y.o. / male        CHIEF COMPLAINT / REASON FOR OFFICE VISIT     Essential hypertension (07/29/2020  Follow up)  Atrial fibrillation    HISTORY OF PRESENT ILLNESS       HPI  Francisco Sequeira is a 85 y.o. male who presents today for reevaluation.  Clinically patient is doing well.  Patient occasionally gets some swelling in his legs but overall denies any types of palpitations syncope near syncope recent falls.      The following portions of the patient's history were reviewed and updated as appropriate: allergies, current medications, past family history, past medical history, past social history, past surgical history and problem list.      VITAL SIGNS     Visit Vitals  /68 (BP Location: Left arm)   Pulse 60   Ht 182.9 cm (72\")   Wt 86.2 kg (190 lb)   SpO2 99%   BMI 25.77 kg/m²         Wt Readings from Last 3 Encounters:   07/30/21 86.2 kg (190 lb)   06/02/21 83.3 kg (183 lb 9.6 oz)   04/13/21 82.6 kg (182 lb)     Body mass index is 25.77 kg/m².      REVIEW OF SYSTEMS   ROS        PHYSICAL EXAMINATION     Vitals reviewed.   Constitutional:       Appearance: Healthy appearance.   Pulmonary:      Effort: Pulmonary effort is normal.   Cardiovascular:      Normal rate. Irregularly irregular rhythm. Normal S1. Normal S2.      Murmurs: There is no murmur.      No gallop. No click. No rub.   Pulses:     Intact distal pulses.   Edema:     Peripheral edema absent.   Neurological:      Mental Status: Alert.           REVIEWED DATA     Procedures    Cardiac Procedures:  1.     Lipid Panel    Lipid Panel 3/2/21   Total Cholesterol 147   Triglycerides 162 (A)   HDL Cholesterol 35 (A)   VLDL Cholesterol 28   LDL Cholesterol  84   LDL/HDL Ratio 2.27   (A) Abnormal value       Comments are available for some flowsheets but are not being displayed.               ASSESSMENT & PLAN      Diagnosis Plan   1. Atrial fibrillation, " unspecified type (CMS/[facility])           SUMMARY/DISCUSSION  1. Atrial fibrillation.  Heart rate stable patient is doing well no issues.  2. Hypertension blood pressures also acceptable  3. Continue the same follow-up 1 year and sooner if issues.        MEDICATIONS         Discharge Medications          Accurate as of July 30, 2021  2:56 PM. If you have any questions, ask your nurse or doctor.            Continue These Medications      Instructions Start Date   Accu-Chek Darlene device   Test blood sugar tid      Accu-Chek FastClix Lancets misc   TEST BLOOD SUGAR 3 TO 4 TIMES A DAY      Safety Lancets 21G misc   Test blood sugar twice a day      Aspirin Adult Low Strength 81 MG EC tablet  Generic drug: aspirin   TAKE ONE TABLET BY MOUTH DAILY      atorvastatin 40 MG tablet  Commonly known as: LIPITOR   TAKE ONE TABLET BY MOUTH DAILY      digoxin 125 MCG tablet  Commonly known as: LANOXIN   TAKE ONE TABLET BY MOUTH DAILY      diphenhydrAMINE 25 MG tablet  Commonly known as: BENADRYL   100 mg, Oral, 2 Times Daily      fish oil 1000 MG capsule capsule   4,000 mg, Oral, Daily With Breakfast      furosemide 20 MG tablet  Commonly known as: LASIX   TAKE ONE TABLET BY MOUTH EVERY OTHER DAY      glucose blood test strip   TEST BLOOD SUGAR 3 TO 4 TIMES A DAY      guaiFENesin 600 MG 12 hr tablet  Commonly known as: MUCINEX   Oral, Every 12 Hours      insulin lispro 100 UNIT/ML injection  Commonly known as: humaLOG   USE AS DIRECTED WITH THE V-GO PUMP      magnesium oxide 400 MG tablet  Commonly known as: MAG-OX   400 mg, Oral, 2 Times Daily      metoprolol succinate XL 25 MG 24 hr tablet  Commonly known as: TOPROL-XL   TAKE ONE TABLET BY MOUTH DAILY      Pen Needles 32G X 4 MM misc   USE AS DIRECTED      Tresiba FlexTouch 100 UNIT/ML solution pen-injector injection  Generic drug: insulin degludec   INJECT 65 UNITS UNDER THE SKIN DAILY      V-Go 40 kit   USE AS DIRECTED THREE TIMES A DAY      warfarin 5 MG tablet  Commonly known as:  COUMADIN   TAKE 1 AND 1/2 TABLET BY MOUTH DAILY ON MONDAY, WEDNESDAY AND FRIDAY, AND ONE TABLET DAILY ON ALL OTHER DAYS OF THE WEEK OR AS DIRECTED                 **Dragon Disclaimer:   Much of this encounter note is an electronic transcription/translation of spoken language to printed text. The electronic translation of spoken language may permit erroneous, or at times, nonsensical words or phrases to be inadvertently transcribed. Although I have reviewed the note for such errors, some may still exist.

## 2021-08-04 ENCOUNTER — ANTICOAGULATION VISIT (OUTPATIENT)
Dept: PHARMACY | Facility: HOSPITAL | Age: 86
End: 2021-08-04

## 2021-08-04 DIAGNOSIS — Z95.2 HX OF AORTIC VALVE REPLACEMENT, MECHANICAL: Primary | ICD-10-CM

## 2021-08-04 DIAGNOSIS — I63.411 CEREBROVASCULAR ACCIDENT (CVA) DUE TO EMBOLISM OF RIGHT MIDDLE CEREBRAL ARTERY (HCC): ICD-10-CM

## 2021-08-04 LAB — INR PPP: 3

## 2021-08-04 NOTE — PROGRESS NOTES
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    INR History:  Anticoagulation Monitoring 7/21/2021 7/28/2021 8/4/2021   INR 3.00 3.10 3.00   INR Date 7/21/2021 7/28/2021 8/4/2021   INR Goal 3.0-3.5 3.0-3.5 3.0-3.5   Trend Same Same Same   Last Week Total 42.5 mg 42.5 mg 42.5 mg   Next Week Total 42.5 mg 42.5 mg 42.5 mg   Sun 5 mg 5 mg 5 mg   Mon 7.5 mg 7.5 mg 7.5 mg   Tue 5 mg 5 mg 5 mg   Wed 7.5 mg 7.5 mg 7.5 mg   Thu 5 mg 5 mg 5 mg   Fri 7.5 mg 7.5 mg 7.5 mg   Sat 5 mg 5 mg 5 mg   Visit Report - - -   Some recent data might be hidden       Plan:  1. INR is therapeutic today- see above in Anticoagulation Summary.    Francisco Sequeira to continue their warfarin regimen- see above in Anticoagulation Summary.  2. Follow up in 1 week  3. Pt has agreed to only be called if INR out of range. They have been instructed to call if any changes in medications, doses, concerns,  etc. Patient expresses understanding and has no further questions at this time.    Natty Sullivan

## 2021-08-09 DIAGNOSIS — L08.9 SOFT TISSUE INFECTION: ICD-10-CM

## 2021-08-09 RX ORDER — WARFARIN SODIUM 5 MG/1
TABLET ORAL
Qty: 110 TABLET | Refills: 0 | Status: ON HOLD | OUTPATIENT
Start: 2021-08-09 | End: 2021-11-04

## 2021-08-09 NOTE — TELEPHONE ENCOUNTER
Rx Refill Note  Requested Prescriptions     Pending Prescriptions Disp Refills   • mupirocin (BACTROBAN) 2 % ointment [Pharmacy Med Name: MUPIROCIN 2% OINTMENT] 22 each 0     Sig: APPLY TO AFFECTED AREA(S) THREE TIMES A DAY      Last office visit with prescribing clinician: 6/2/2021      Next office visit with prescribing clinician: 12/2/2021     STATES IT WAS DISCONTINUED BY ANOTHER PROVIDER        Amirah Coleman MA  08/09/21, 15:50 EDT

## 2021-08-11 ENCOUNTER — ANTICOAGULATION VISIT (OUTPATIENT)
Dept: PHARMACY | Facility: HOSPITAL | Age: 86
End: 2021-08-11

## 2021-08-11 DIAGNOSIS — Z95.2 HX OF AORTIC VALVE REPLACEMENT, MECHANICAL: Primary | ICD-10-CM

## 2021-08-11 DIAGNOSIS — I63.411 CEREBROVASCULAR ACCIDENT (CVA) DUE TO EMBOLISM OF RIGHT MIDDLE CEREBRAL ARTERY (HCC): ICD-10-CM

## 2021-08-11 LAB — INR PPP: 3.4

## 2021-08-11 NOTE — PROGRESS NOTES
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    INR History:  Anticoagulation Monitoring 7/28/2021 8/4/2021 8/11/2021   INR 3.10 3.00 3.40   INR Date 7/28/2021 8/4/2021 8/11/2021   INR Goal 3.0-3.5 3.0-3.5 3.0-3.5   Trend Same Same Same   Last Week Total 42.5 mg 42.5 mg 42.5 mg   Next Week Total 42.5 mg 42.5 mg 42.5 mg   Sun 5 mg 5 mg 5 mg   Mon 7.5 mg 7.5 mg 7.5 mg   Tue 5 mg 5 mg 5 mg   Wed 7.5 mg 7.5 mg 7.5 mg   Thu 5 mg 5 mg 5 mg   Fri 7.5 mg 7.5 mg 7.5 mg   Sat 5 mg 5 mg 5 mg   Visit Report - - -   Some recent data might be hidden       Plan:  1. INR is therapeutic today- see above in Anticoagulation Summary.    Francisco Sequeira to continue their warfarin regimen- see above in Anticoagulation Summary.  2. Follow up in 1 week  3. Pt has agreed to only be called if INR out of range. They have been instructed to call if any changes in medications, doses,  concerns, etc. Patient expresses understanding and has no further questions at this time.    Natty Sullivan

## 2021-08-17 NOTE — TELEPHONE ENCOUNTER
the patient's last visit was 6/2/21   [facility]. HCP Adela ([phone]) aware and in agreement. DC plan communicated with CM/SW through Teams DC plan sheet.

## 2021-08-18 ENCOUNTER — ANTICOAGULATION VISIT (OUTPATIENT)
Dept: PHARMACY | Facility: HOSPITAL | Age: 86
End: 2021-08-18

## 2021-08-18 DIAGNOSIS — I63.411 CEREBROVASCULAR ACCIDENT (CVA) DUE TO EMBOLISM OF RIGHT MIDDLE CEREBRAL ARTERY (HCC): ICD-10-CM

## 2021-08-18 DIAGNOSIS — Z95.2 HX OF AORTIC VALVE REPLACEMENT, MECHANICAL: Primary | ICD-10-CM

## 2021-08-18 LAB — INR PPP: 3.2

## 2021-08-18 NOTE — PROGRESS NOTES
Anticoagulation Clinic Progress Note    Anticoagulation Summary  As of 8/18/2021    INR goal:  3.0-3.5   TTR:  64.3 % (2.9 y)   INR used for dosing:  3.20 (8/18/2021)   Warfarin maintenance plan:  7.5 mg every Mon, Wed, Fri; 5 mg all other days   Weekly warfarin total:  42.5 mg   No change documented:  Natty Sullivan   Plan last modified:  Vasquez Niño, PharmD (12/16/2020)   Next INR check:  8/25/2021   Priority:  High   Target end date:  Indefinite    Indications    Hx of aortic valve replacement  mechanical [Z95.2] [Z95.2]  Atrial fibrillation (CMS/HCC) [I48.91]  Cerebrovascular accident (CVA) due to embolism of right middle cerebral artery (CMS/HCC) [I63.411]             Anticoagulation Episode Summary     INR check location:      Preferred lab:      Send INR reminders to:   [facility]  POOL    Comments:  New INR goal 3 - 3.5 (see 1/2020 hospitalization for CVA)      Anticoagulation Care Providers     Provider Role Specialty Phone number    Griffin Fried MD Referring Cardiology [phone]          Clinic Interview:  No pertinent clinical findings have been reported.    INR History:  Anticoagulation Monitoring 8/4/2021 8/11/2021 8/18/2021   INR 3.00 3.40 3.20   INR Date 8/4/2021 8/11/2021 8/18/2021   INR Goal 3.0-3.5 3.0-3.5 3.0-3.5   Trend Same Same Same   Last Week Total 42.5 mg 42.5 mg 42.5 mg   Next Week Total 42.5 mg 42.5 mg 42.5 mg   Sun 5 mg 5 mg 5 mg   Mon 7.5 mg 7.5 mg 7.5 mg   Tue 5 mg 5 mg 5 mg   Wed 7.5 mg 7.5 mg 7.5 mg   Thu 5 mg 5 mg 5 mg   Fri 7.5 mg 7.5 mg 7.5 mg   Sat 5 mg 5 mg 5 mg   Visit Report - - -   Some recent data might be hidden       Plan:  1. INR is therapeutic today- see above in Anticoagulation Summary.    Francisco Sequeira to continue their warfarin regimen- see above in Anticoagulation Summary.  2. Follow up in 1 week  3. Pt has agreed to only be called if INR out of range. They have been instructed to call if any changes in medications, doses,  concerns, etc. Patient expresses understanding and has no further questions at this time.    Natty Sullivan

## 2021-08-25 ENCOUNTER — ANTICOAGULATION VISIT (OUTPATIENT)
Dept: PHARMACY | Facility: HOSPITAL | Age: 86
End: 2021-08-25

## 2021-08-25 DIAGNOSIS — Z95.2 HX OF AORTIC VALVE REPLACEMENT, MECHANICAL: Primary | ICD-10-CM

## 2021-08-25 DIAGNOSIS — I63.411 CEREBROVASCULAR ACCIDENT (CVA) DUE TO EMBOLISM OF RIGHT MIDDLE CEREBRAL ARTERY (HCC): ICD-10-CM

## 2021-08-25 LAB — INR PPP: 3.2

## 2021-08-25 NOTE — PROGRESS NOTES
Anticoagulation Clinic Progress Note    Anticoagulation Summary  As of 8/25/2021    INR goal:  3.0-3.5   TTR:  64.6 % (2.9 y)   INR used for dosing:  3.20 (8/25/2021)   Warfarin maintenance plan:  7.5 mg every Mon, Wed, Fri; 5 mg all other days   Weekly warfarin total:  42.5 mg   No change documented:  Princess Robin   Plan last modified:  Vasquez Niño, PharmD (12/16/2020)   Next INR check:  9/8/2021   Priority:  High   Target end date:  Indefinite    Indications    Hx of aortic valve replacement  mechanical [Z95.2] [Z95.2]  Atrial fibrillation (CMS/HCC) [I48.91]  Cerebrovascular accident (CVA) due to embolism of right middle cerebral artery (CMS/HCC) [I63.411]             Anticoagulation Episode Summary     INR check location:      Preferred lab:      Send INR reminders to:   [facility]  POOL    Comments:  New INR goal 3 - 3.5 (see 1/2020 hospitalization for CVA)      Anticoagulation Care Providers     Provider Role Specialty Phone number    Griffin Fried MD Referring Cardiology [phone]          Clinic Interview:  No pertinent clinical findings have been reported.    INR History:  Anticoagulation Monitoring 8/11/2021 8/18/2021 8/25/2021   INR 3.40 3.20 3.20   INR Date 8/11/2021 8/18/2021 8/25/2021   INR Goal 3.0-3.5 3.0-3.5 3.0-3.5   Trend Same Same Same   Last Week Total 42.5 mg 42.5 mg 42.5 mg   Next Week Total 42.5 mg 42.5 mg 42.5 mg   Sun 5 mg 5 mg 5 mg   Mon 7.5 mg 7.5 mg 7.5 mg   Tue 5 mg 5 mg 5 mg   Wed 7.5 mg 7.5 mg 7.5 mg   Thu 5 mg 5 mg 5 mg   Fri 7.5 mg 7.5 mg 7.5 mg   Sat 5 mg 5 mg 5 mg   Visit Report - - -   Some recent data might be hidden       Plan:  1. INR is therapeutic today- see above in Anticoagulation Summary.    Francisco Sequeira to continue their warfarin regimen- see above in Anticoagulation Summary.  2. Follow up in 2 weeks  3. Pt has agreed to only be called if INR out of range. They have been instructed to call if any changes in medications, doses, concerns,  etc. Patient expresses understanding and has no further questions at this time.    Princess Robin

## 2021-09-01 ENCOUNTER — ANTICOAGULATION VISIT (OUTPATIENT)
Dept: PHARMACY | Facility: HOSPITAL | Age: 86
End: 2021-09-01

## 2021-09-01 DIAGNOSIS — I63.411 CEREBROVASCULAR ACCIDENT (CVA) DUE TO EMBOLISM OF RIGHT MIDDLE CEREBRAL ARTERY (HCC): ICD-10-CM

## 2021-09-01 DIAGNOSIS — Z95.2 HX OF AORTIC VALVE REPLACEMENT, MECHANICAL: Primary | ICD-10-CM

## 2021-09-01 LAB — INR PPP: 3

## 2021-09-01 NOTE — PROGRESS NOTES
Anticoagulation Clinic Progress Note    Anticoagulation Summary  As of 9/1/2021    INR goal:  3.0-3.5   TTR:  64.8 % (2.9 y)   INR used for dosing:  3.00 (9/1/2021)   Warfarin maintenance plan:  7.5 mg every Mon, Wed, Fri; 5 mg all other days   Weekly warfarin total:  42.5 mg   No change documented:  Angie Sykes   Plan last modified:  Vasquez Niño, PharmD (12/16/2020)   Next INR check:  9/8/2021   Priority:  High   Target end date:  Indefinite    Indications    Hx of aortic valve replacement  mechanical [Z95.2] [Z95.2]  Atrial fibrillation (CMS/HCC) [I48.91]  Cerebrovascular accident (CVA) due to embolism of right middle cerebral artery (CMS/HCC) [I63.411]             Anticoagulation Episode Summary     INR check location:      Preferred lab:      Send INR reminders to:   [facility]  POOL    Comments:  New INR goal 3 - 3.5 (see 1/2020 hospitalization for CVA)      Anticoagulation Care Providers     Provider Role Specialty Phone number    Griffin Fried MD Referring Cardiology [phone]          Clinic Interview:  No pertinent clinical findings have been reported.    INR History:  Anticoagulation Monitoring 8/18/2021 8/25/2021 9/1/2021   INR 3.20 3.20 3.00   INR Date 8/18/2021 8/25/2021 9/1/2021   INR Goal 3.0-3.5 3.0-3.5 3.0-3.5   Trend Same Same Same   Last Week Total 42.5 mg 42.5 mg 42.5 mg   Next Week Total 42.5 mg 42.5 mg 42.5 mg   Sun 5 mg 5 mg 5 mg   Mon 7.5 mg 7.5 mg 7.5 mg   Tue 5 mg 5 mg 5 mg   Wed 7.5 mg 7.5 mg 7.5 mg   Thu 5 mg 5 mg 5 mg   Fri 7.5 mg 7.5 mg 7.5 mg   Sat 5 mg 5 mg 5 mg   Visit Report - - -   Some recent data might be hidden       Plan:  1. INR is therapeutic today- see above in Anticoagulation Summary.    Francisco Sequeira to continue their warfarin regimen- see above in Anticoagulation Summary.  2. Follow up in 1 week  3. Pt has agreed to only be called if INR out of range. They have been instructed to call if any changes in medications, doses, concerns, etc.  Patient expresses understanding and has no further questions at this time.    Angie Sykes

## 2021-09-08 ENCOUNTER — ANTICOAGULATION VISIT (OUTPATIENT)
Dept: PHARMACY | Facility: HOSPITAL | Age: 86
End: 2021-09-08

## 2021-09-08 DIAGNOSIS — I63.411 CEREBROVASCULAR ACCIDENT (CVA) DUE TO EMBOLISM OF RIGHT MIDDLE CEREBRAL ARTERY (HCC): ICD-10-CM

## 2021-09-08 DIAGNOSIS — Z95.2 HX OF AORTIC VALVE REPLACEMENT, MECHANICAL: Primary | ICD-10-CM

## 2021-09-08 LAB — INR PPP: 3

## 2021-09-08 NOTE — PROGRESS NOTES
Anticoagulation Clinic Progress Note    Anticoagulation Summary  As of 9/8/2021    INR goal:  3.0-3.5   TTR:  65.0 % (2.9 y)   INR used for dosing:  3.00 (9/8/2021)   Warfarin maintenance plan:  7.5 mg every Mon, Wed, Fri; 5 mg all other days   Weekly warfarin total:  42.5 mg   Plan last modified:  Vasquez Niño, PharmD (12/16/2020)   Next INR check:  9/15/2021   Priority:  High   Target end date:  Indefinite    Indications    Hx of aortic valve replacement  mechanical [Z95.2] [Z95.2]  Atrial fibrillation (CMS/HCC) [I48.91]  Cerebrovascular accident (CVA) due to embolism of right middle cerebral artery (CMS/HCC) [I63.411]             Anticoagulation Episode Summary     INR check location:      Preferred lab:      [facility]    Comments:  New INR goal 3 - 3.5 (see 1/2020 hospitalization for CVA)      Anticoagulation Care Providers     Provider Role Specialty Phone number    Griffin Fried MD Referring Cardiology [phone]          Clinic Interview:  No pertinent clinical findings have been reported.    INR History:  Anticoagulation Monitoring 8/25/2021 9/1/2021 9/8/2021   INR 3.20 3.00 3.00   INR Date 8/25/2021 9/1/2021 9/8/2021   INR Goal 3.0-3.5 3.0-3.5 3.0-3.5   Trend Same Same Same   Last Week Total 42.5 mg 42.5 mg 42.5 mg   Next Week Total 42.5 mg 42.5 mg 42.5 mg   Sun 5 mg 5 mg 5 mg   Mon 7.5 mg 7.5 mg 7.5 mg   Tue 5 mg 5 mg 5 mg   Wed 7.5 mg 7.5 mg 7.5 mg   Thu 5 mg 5 mg 5 mg   Fri 7.5 mg 7.5 mg 7.5 mg   Sat 5 mg 5 mg 5 mg   Visit Report - - -   Some recent data might be hidden       Plan:  1. INR is therapeutic today- see above in Anticoagulation Summary.    Francisco Sequeira to continue their warfarin regimen- see above in Anticoagulation Summary.  2. Follow up in 1 weeks  3. Pt has agreed to only be called if INR out of range. They have been instructed to call if any changes in medications, doses, concerns, etc. Patient expresses understanding and has no  further questions at this time.    Princess Robin

## 2021-09-13 DIAGNOSIS — IMO0002 UNCONTROLLED TYPE 2 DIABETES MELLITUS WITH COMPLICATION, WITH LONG-TERM CURRENT USE OF INSULIN: ICD-10-CM

## 2021-09-14 NOTE — TELEPHONE ENCOUNTER
Rx Refill Note  Requested Prescriptions     Pending Prescriptions Disp Refills   • insulin lispro (humaLOG) 100 UNIT/ML injection [Pharmacy Med Name: INSULIN LISPRO 100 UNIT/ML VL] 30 mL      Sig: USE AS DIRECTED WITH V-GO PUMP      Last office visit with prescribing clinician: Visit date not found      Next office visit with prescribing clinician: Visit date not found            Rodney Farias MA  09/14/21, 08:13 EDT

## 2021-09-15 ENCOUNTER — ANTICOAGULATION VISIT (OUTPATIENT)
Dept: PHARMACY | Facility: HOSPITAL | Age: 86
End: 2021-09-15

## 2021-09-15 DIAGNOSIS — I63.411 CEREBROVASCULAR ACCIDENT (CVA) DUE TO EMBOLISM OF RIGHT MIDDLE CEREBRAL ARTERY (HCC): ICD-10-CM

## 2021-09-15 DIAGNOSIS — Z95.2 HX OF AORTIC VALVE REPLACEMENT, MECHANICAL: Primary | ICD-10-CM

## 2021-09-15 LAB — INR PPP: 3

## 2021-09-15 NOTE — PROGRESS NOTES
Anticoagulation Clinic Progress Note    Anticoagulation Summary  As of 9/15/2021    INR goal:  3.0-3.5   TTR:  65.3 % (2.9 y)   INR used for dosing:  3.00 (9/15/2021)   Warfarin maintenance plan:  7.5 mg every Mon, Wed, Fri; 5 mg all other days   Weekly warfarin total:  42.5 mg   No change documented:  Princess Robin   Plan last modified:  Vasquez Niño, PharmD (12/16/2020)   Next INR check:     Priority:  High   Target end date:  Indefinite    Indications    Hx of aortic valve replacement  mechanical [Z95.2] [Z95.2]  Atrial fibrillation (CMS/HCC) [I48.91]  Cerebrovascular accident (CVA) due to embolism of right middle cerebral artery (CMS/HCC) [I63.411]             Anticoagulation Episode Summary     INR check location:      Preferred lab:      Send INR reminders to:   [facility]  POOL    Comments:  New INR goal 3 - 3.5 (see 1/2020 hospitalization for CVA)      Anticoagulation Care Providers     Provider Role Specialty Phone number    Griffin Fried MD Referring Cardiology [phone]          Clinic Interview:  No pertinent clinical findings have been reported.    INR History:  Anticoagulation Monitoring 9/1/2021 9/8/2021 9/15/2021   INR 3.00 3.00 3.00   INR Date 9/1/2021 9/8/2021 9/15/2021   INR Goal 3.0-3.5 3.0-3.5 3.0-3.5   Trend Same Same Same   Last Week Total 42.5 mg 42.5 mg 42.5 mg   Next Week Total 42.5 mg 42.5 mg 42.5 mg   Sun 5 mg 5 mg 5 mg   Mon 7.5 mg 7.5 mg 7.5 mg   Tue 5 mg 5 mg 5 mg   Wed 7.5 mg 7.5 mg 7.5 mg   Thu 5 mg 5 mg 5 mg   Fri 7.5 mg 7.5 mg 7.5 mg   Sat 5 mg 5 mg 5 mg   Visit Report - - -   Some recent data might be hidden       Plan:  1. INR is therapeutic today- see above in Anticoagulation Summary.    Francisco Sequeira to continue their warfarin regimen- see above in Anticoagulation Summary.  2. Follow up in 1 weeks  3. Pt has agreed to only be called if INR out of range. They have been instructed to call if any changes in medications, doses, concerns, etc. Patient  expresses understanding and has no further questions at this time.    Princess Robin

## 2021-09-22 ENCOUNTER — ANTICOAGULATION VISIT (OUTPATIENT)
Dept: PHARMACY | Facility: HOSPITAL | Age: 86
End: 2021-09-22

## 2021-09-22 DIAGNOSIS — I63.411 CEREBROVASCULAR ACCIDENT (CVA) DUE TO EMBOLISM OF RIGHT MIDDLE CEREBRAL ARTERY (HCC): ICD-10-CM

## 2021-09-22 DIAGNOSIS — Z95.2 HX OF AORTIC VALVE REPLACEMENT, MECHANICAL: Primary | ICD-10-CM

## 2021-09-22 LAB — INR PPP: 3.1

## 2021-09-22 NOTE — PROGRESS NOTES
Anticoagulation Clinic Progress Note    Anticoagulation Summary  As of 9/22/2021    INR goal:  3.0-3.5   TTR:  65.5 % (3 y)   INR used for dosing:  3.10 (9/22/2021)   Warfarin maintenance plan:  7.5 mg every Mon, Wed, Fri; 5 mg all other days   Weekly warfarin total:  42.5 mg   No change documented:  Princess Robin   Plan last modified:  Vasquez Niño, PharmD (12/16/2020)   Next INR check:  9/29/2021   Priority:  High   Target end date:  Indefinite    Indications    Hx of aortic valve replacement  mechanical [Z95.2] [Z95.2]  Atrial fibrillation (CMS/HCC) [I48.91]  Cerebrovascular accident (CVA) due to embolism of right middle cerebral artery (CMS/HCC) [I63.411]             Anticoagulation Episode Summary     INR check location:      Preferred lab:      Send INR reminders to:  [facility]  POOL    Comments:  New INR goal 3 - 3.5 (see 1/2020 hospitalization for CVA)      Anticoagulation Care Providers     Provider Role Specialty Phone number    Griffin Fried MD Referring Cardiology [phone]          Clinic Interview:  No pertinent clinical findings have been reported.    INR History:  Anticoagulation Monitoring 9/8/2021 9/15/2021 9/22/2021   INR 3.00 3.00 3.10   INR Date 9/8/2021 9/15/2021 9/22/2021   INR Goal 3.0-3.5 3.0-3.5 3.0-3.5   Trend Same Same Same   Last Week Total 42.5 mg 42.5 mg 42.5 mg   Next Week Total 42.5 mg 42.5 mg 42.5 mg   Sun 5 mg 5 mg 5 mg   Mon 7.5 mg 7.5 mg 7.5 mg   Tue 5 mg 5 mg 5 mg   Wed 7.5 mg 7.5 mg 7.5 mg   Thu 5 mg 5 mg 5 mg   Fri 7.5 mg 7.5 mg 7.5 mg   Sat 5 mg 5 mg 5 mg   Visit Report - - -   Some recent data might be hidden       Plan:  1. INR is therapeutic today- see above in Anticoagulation Summary.    Francisco Sequeira to continue their warfarin regimen- see above in Anticoagulation Summary.  2. Follow up in 1 week.  3. Pt has agreed to only be called if INR out of range. They have been instructed to call if any changes in medications, doses, concerns, etc.  Patient expresses understanding and has no further questions at this time.    Princess Robin

## 2021-09-23 RX ORDER — PEN NEEDLE, DIABETIC 30 GX3/16"
NEEDLE, DISPOSABLE MISCELLANEOUS
Qty: 100 EACH | Refills: 3 | Status: ON HOLD | OUTPATIENT
Start: 2021-09-23 | End: 2021-11-04

## 2021-09-29 ENCOUNTER — ANTICOAGULATION VISIT (OUTPATIENT)
Dept: PHARMACY | Facility: HOSPITAL | Age: 86
End: 2021-09-29

## 2021-09-29 DIAGNOSIS — I63.411 CEREBROVASCULAR ACCIDENT (CVA) DUE TO EMBOLISM OF RIGHT MIDDLE CEREBRAL ARTERY (HCC): ICD-10-CM

## 2021-09-29 DIAGNOSIS — Z95.2 HX OF AORTIC VALVE REPLACEMENT, MECHANICAL: Primary | ICD-10-CM

## 2021-09-29 LAB — INR PPP: 3.2

## 2021-09-29 NOTE — PROGRESS NOTES
Anticoagulation Clinic Progress Note    Anticoagulation Summary  As of 9/29/2021    INR goal:  3.0-3.5   TTR:  65.7 % (3 y)   INR used for dosing:  3.20 (9/29/2021)   Warfarin maintenance plan:  7.5 mg every Mon, Wed, Fri; 5 mg all other days   Weekly warfarin total:  42.5 mg   No change documented:  Angie Sykes   Plan last modified:  Vasquez Niño, PharmD (12/16/2020)   Next INR check:  10/6/2021   Priority:  High   Target end date:  Indefinite    Indications    Hx of aortic valve replacement  mechanical [Z95.2] [Z95.2]  Atrial fibrillation (CMS/HCC) [I48.91]  Cerebrovascular accident (CVA) due to embolism of right middle cerebral artery (CMS/HCC) [I63.411]             Anticoagulation Episode Summary     INR check location:      Preferred lab:      Send INR reminders to:  [facility]  POOL    Comments:  New INR goal 3 - 3.5 (see 1/2020 hospitalization for CVA)      Anticoagulation Care Providers     Provider Role Specialty Phone number    Griffin Fried MD Referring Cardiology [phone]          Clinic Interview:  No pertinent clinical findings have been reported.    INR History:  Anticoagulation Monitoring 9/15/2021 9/22/2021 9/29/2021   INR 3.00 3.10 3.20   INR Date 9/15/2021 9/22/2021 9/29/2021   INR Goal 3.0-3.5 3.0-3.5 3.0-3.5   Trend Same Same Same   Last Week Total 42.5 mg 42.5 mg 42.5 mg   Next Week Total 42.5 mg 42.5 mg 42.5 mg   Sun 5 mg 5 mg 5 mg   Mon 7.5 mg 7.5 mg 7.5 mg   Tue 5 mg 5 mg 5 mg   Wed 7.5 mg 7.5 mg 7.5 mg   Thu 5 mg 5 mg 5 mg   Fri 7.5 mg 7.5 mg 7.5 mg   Sat 5 mg 5 mg 5 mg   Visit Report - - -   Some recent data might be hidden       Plan:  1. INR is therapeutic today- see above in Anticoagulation Summary.    Francisco Sequeira to continue their warfarin regimen- see above in Anticoagulation Summary.  2. Follow up in 1 week  3. Pt has agreed to only be called if INR out of range. They have been instructed to call if any changes in medications, doses, concerns, etc.  Patient expresses understanding and has no further questions at this time.    Angie Sykes

## 2021-10-06 ENCOUNTER — ANTICOAGULATION VISIT (OUTPATIENT)
Dept: PHARMACY | Facility: HOSPITAL | Age: 86
End: 2021-10-06

## 2021-10-06 DIAGNOSIS — I63.411 CEREBROVASCULAR ACCIDENT (CVA) DUE TO EMBOLISM OF RIGHT MIDDLE CEREBRAL ARTERY (HCC): ICD-10-CM

## 2021-10-06 DIAGNOSIS — Z95.2 HX OF AORTIC VALVE REPLACEMENT, MECHANICAL: Primary | ICD-10-CM

## 2021-10-06 LAB — INR PPP: 3.2

## 2021-10-06 NOTE — PROGRESS NOTES
Anticoagulation Clinic Progress Note    Anticoagulation Summary  As of 10/6/2021    INR goal:  3.0-3.5   TTR:  65.9 % (3 y)   INR used for dosing:  3.20 (10/6/2021)   Warfarin maintenance plan:  7.5 mg every Mon, Wed, Fri; 5 mg all other days   Weekly warfarin total:  42.5 mg   No change documented:  Princess Robin   Plan last modified:  Vasquez Niño, PharmD (12/16/2020)   Next INR check:  10/13/2021   Priority:  High   Target end date:  Indefinite    Indications    Hx of aortic valve replacement  mechanical [Z95.2] [Z95.2]  Atrial fibrillation (HCC) [I48.91]  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]             Anticoagulation Episode Summary     INR check location:      Preferred lab:      Send IN[facility] reminders to:   [facility]  POOL    Comments:  New INR goal 3 - 3.5 (see 1/2020 hospitalization for CVA)      Anticoagulation Care Providers     Provider Role Specialty Phone number    Griffin Fried MD Referring Cardiology [phone]          Clinic Interview:  No pertinent clinical findings have been reported.    INR History:  Anticoagulation Monitoring 9/22/2021 9/29/2021 10/6/2021   INR 3.10 3.20 3.20   INR Date 9/22/2021 9/29/2021 10/6/2021   INR Goal 3.0-3.5 3.0-3.5 3.0-3.5   Trend Same Same Same   Last Week Total 42.5 mg 42.5 mg 42.5 mg   Next Week Total 42.5 mg 42.5 mg 42.5 mg   Sun 5 mg 5 mg 5 mg   Mon 7.5 mg 7.5 mg 7.5 mg   Tue 5 mg 5 mg 5 mg   Wed 7.5 mg 7.5 mg 7.5 mg   Thu 5 mg 5 mg 5 mg   Fri 7.5 mg 7.5 mg 7.5 mg   Sat 5 mg 5 mg 5 mg   Visit Report - - -   Some recent data might be hidden       Plan:  1. INR is therapeutic today- see above in Anticoagulation Summary.    Francisco Sequeira to continue their warfarin regimen- see above in Anticoagulation Summary.  2. Follow up in 1 weeks  3. Pt has agreed to only be called if INR out of range. They have been instructed to call if any changes in medications, doses, concerns, etc.  Patient expresses understanding and has no further questions at this time.    Princess Robin

## 2021-10-13 ENCOUNTER — ANTICOAGULATION VISIT (OUTPATIENT)
Dept: PHARMACY | Facility: HOSPITAL | Age: 86
End: 2021-10-13

## 2021-10-13 DIAGNOSIS — Z95.2 HX OF AORTIC VALVE REPLACEMENT, MECHANICAL: Primary | ICD-10-CM

## 2021-10-13 DIAGNOSIS — I63.411 CEREBROVASCULAR ACCIDENT (CVA) DUE TO EMBOLISM OF RIGHT MIDDLE CEREBRAL ARTERY (HCC): ICD-10-CM

## 2021-10-13 LAB — INR PPP: 3.1

## 2021-10-13 NOTE — PROGRESS NOTES
Anticoagulation Clinic Progress Note    Anticoagulation Summary  As of 10/13/2021    INR goal:  3.0-3.5   TTR:  66.2 % (3 y)   INR used for dosing:  3.10 (10/13/2021)   Warfarin maintenance plan:  7.5 mg every Mon, Wed, Fri; 5 mg all other days   Weekly warfarin total:  42.5 mg   No change documented:  Natty Sullivan   Plan last modified:  Vasquez Niño, PharmD (12/16/2020)   Next INR check:  10/20/2021   Priority:  High   Target end date:  Indefinite    Indications    Hx of aortic valve replacement  mechanical [Z95.2] [Z95.2]  Atrial fibrillation (HCC) [I48.91]  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]             Anticoagulation Episode Summary     INR check location:      Preferred lab:      Send INR reminders to:   [facility]  POOL    Comments:  New INR goal 3 - 3.5 (see 1/2020 hospitalization for CVA)      Anticoagulation Care Providers     Provider Role Specialty Phone number    Griffin Fried MD Referring Cardiology [phone]          Clinic Interview:  No pertinent clinical findings have been reported.    INR History:  Anticoagulation Monitoring 9/29/2021 10/6/2021 10/13/2021   INR 3.20 3.20 3.10   INR Date 9/29/2021 10/6/2021 10/13/2021   INR Goal 3.0-3.5 3.0-3.5 3.0-3.5   Trend Same Same Same   Last Week Total 42.5 mg 42.5 mg 42.5 mg   Next Week Total 42.5 mg 42.5 mg 42.5 mg   Sun 5 mg 5 mg 5 mg   Mon 7.5 mg 7.5 mg 7.5 mg   Tue 5 mg 5 mg 5 mg   Wed 7.5 mg 7.5 mg 7.5 mg   Thu 5 mg 5 mg 5 mg   Fri 7.5 mg 7.5 mg 7.5 mg   Sat 5 mg 5 mg 5 mg   Visit Report - - -   Some recent data might be hidden       Plan:  1. INR is therapeutic today- see above in Anticoagulation Summary.    Francisco Sequeira to continue their warfarin regimen- see above in Anticoagulation Summary.  2. Follow up in 1 week  3. Pt has agreed to only be called if INR out of range. They have been instructed to call if any changes in medications, doses, concerns,  etc. Patient expresses understanding and has no further questions at this time.    Natty Sullivan

## 2021-10-20 ENCOUNTER — ANTICOAGULATION VISIT (OUTPATIENT)
Dept: PHARMACY | Facility: HOSPITAL | Age: 86
End: 2021-10-20

## 2021-10-20 DIAGNOSIS — I63.411 CEREBROVASCULAR ACCIDENT (CVA) DUE TO EMBOLISM OF RIGHT MIDDLE CEREBRAL ARTERY (HCC): ICD-10-CM

## 2021-10-20 DIAGNOSIS — Z95.2 HX OF AORTIC VALVE REPLACEMENT, MECHANICAL: Primary | ICD-10-CM

## 2021-10-20 LAB — INR PPP: 3.2

## 2021-10-20 NOTE — PROGRESS NOTES
Anticoagulation Clinic Progress Note    Anticoagulation Summary  As of 10/20/2021    INR goal:  3.0-3.5   TTR:  66.4 % (3 y)   INR used for dosing:  3.20 (10/20/2021)   Warfarin maintenance plan:  7.5 mg every Mon, Wed, Fri; 5 mg all other days   Weekly warfarin total:  42.5 mg   No change documented:  Princess Robin   Plan last modified:  Vasquez Niño, PharmD (12/16/2020)   Next INR check:  10/27/2021   Priority:  High   Target end date:  Indefinite    Indications    Hx of aortic valve replacement  mechanical [Z95.2] [Z95.2]  Atrial fibrillation (HCC) [I48.91]  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]             Anticoagulation Episode Summary     INR check location:      Preferred lab:      [facility]    Comments:  New INR goal 3 - 3.5 (see 1/2020 hospitalization for CVA)      Anticoagulation Care Providers     Provider Role Specialty Phone number    Girffin Fried MD Referring Cardiology [phone]          Clinic Interview:  No pertinent clinical findings have been reported.    INR History:  Anticoagulation Monitoring 10/6/2021 10/13/2021 10/20/2021   INR 3.20 3.10 3.20   INR Date 10/6/2021 10/13/2021 10/20/2021   INR Goal 3.0-3.5 3.0-3.5 3.0-3.5   Trend Same Same Same   Last Week Total 42.5 mg 42.5 mg 42.5 mg   Next Week Total 42.5 mg 42.5 mg 42.5 mg   Sun 5 mg 5 mg 5 mg   Mon 7.5 mg 7.5 mg 7.5 mg   Tue 5 mg 5 mg 5 mg   Wed 7.5 mg 7.5 mg 7.5 mg   Thu 5 mg 5 mg 5 mg   Fri 7.5 mg 7.5 mg 7.5 mg   Sat 5 mg 5 mg 5 mg   Visit Report - - -   Some recent data might be hidden       Plan:  1. INR is therapeutic today- see above in Anticoagulation Summary.    Francisco Sequeira to continue their warfarin regimen- see above in Anticoagulation Summary.  2. Follow up in 1 weeks  3. Pt has agreed to only be called if INR out of range. They have been instructed to call if any changes in medications, doses, concerns, etc.  Patient expresses understanding and has no further questions at this time.    Princess Robin

## 2021-10-23 ENCOUNTER — HOSPITAL ENCOUNTER (INPATIENT)
Facility: HOSPITAL | Age: 86
LOS: 7 days | Discharge: HOME | End: 2021-10-30
Attending: EMERGENCY MEDICINE | Admitting: INTERNAL MEDICINE

## 2021-10-23 ENCOUNTER — APPOINTMENT (OUTPATIENT)
Dept: CT IMAGING | Facility: HOSPITAL | Age: 86
End: 2021-10-23

## 2021-10-23 ENCOUNTER — APPOINTMENT (OUTPATIENT)
Dept: GENERAL RADIOLOGY | Facility: HOSPITAL | Age: 86
End: 2021-10-23

## 2021-10-23 DIAGNOSIS — N18.9 ACUTE RENAL FAILURE SUPERIMPOSED ON CHRONIC KIDNEY DISEASE, UNSPECIFIED CKD STAGE, UNSPECIFIED ACUTE RENAL FAILURE TYPE (HCC): ICD-10-CM

## 2021-10-23 DIAGNOSIS — R53.1 GENERAL WEAKNESS: ICD-10-CM

## 2021-10-23 DIAGNOSIS — Z95.2 HX OF AORTIC VALVE REPLACEMENT, MECHANICAL: ICD-10-CM

## 2021-10-23 DIAGNOSIS — R53.1 WEAKNESS: ICD-10-CM

## 2021-10-23 DIAGNOSIS — I50.9 ACUTE ON CHRONIC CONGESTIVE HEART FAILURE, UNSPECIFIED HEART FAILURE TYPE (HCC): ICD-10-CM

## 2021-10-23 DIAGNOSIS — D50.9 IRON DEFICIENCY ANEMIA, UNSPECIFIED IRON DEFICIENCY ANEMIA TYPE: ICD-10-CM

## 2021-10-23 DIAGNOSIS — R06.00 DYSPNEA, UNSPECIFIED TYPE: ICD-10-CM

## 2021-10-23 DIAGNOSIS — Z79.01 ANTICOAGULATED ON COUMADIN: ICD-10-CM

## 2021-10-23 DIAGNOSIS — N17.9 ACUTE RENAL FAILURE SUPERIMPOSED ON CHRONIC KIDNEY DISEASE, UNSPECIFIED CKD STAGE, UNSPECIFIED ACUTE RENAL FAILURE TYPE (HCC): ICD-10-CM

## 2021-10-23 DIAGNOSIS — D64.9 SYMPTOMATIC ANEMIA: ICD-10-CM

## 2021-10-23 DIAGNOSIS — N18.31 STAGE 3A CHRONIC KIDNEY DISEASE (HCC): ICD-10-CM

## 2021-10-23 DIAGNOSIS — G45.9 TIA (TRANSIENT ISCHEMIC ATTACK): Primary | ICD-10-CM

## 2021-10-23 DIAGNOSIS — R53.1 LEFT-SIDED WEAKNESS: ICD-10-CM

## 2021-10-23 DIAGNOSIS — D50.0 IRON DEFICIENCY ANEMIA DUE TO CHRONIC BLOOD LOSS: ICD-10-CM

## 2021-10-23 LAB
ABO GROUP BLD: NORMAL
ALBUMIN SERPL-MCNC: 3.6 G/DL (ref 3.5–5.2)
ALBUMIN/GLOB SERPL: 1.2 G/DL
ALP SERPL-CCNC: 109 U/L (ref 39–117)
ALT SERPL W P-5'-P-CCNC: 17 U/L (ref 1–41)
ANION GAP SERPL CALCULATED.3IONS-SCNC: 11 MMOL/L (ref 5–15)
APTT PPP: 51 SECONDS (ref 22.7–35.4)
AST SERPL-CCNC: 17 U/L (ref 1–40)
BASOPHILS # BLD AUTO: 0.03 10*3/MM3 (ref 0–0.2)
BASOPHILS NFR BLD AUTO: 0.5 % (ref 0–1.5)
BILIRUB SERPL-MCNC: 0.4 MG/DL (ref 0–1.2)
BLD GP AB SCN SERPL QL: NEGATIVE
BUN SERPL-MCNC: 51 MG/DL (ref 8–23)
BUN/CREAT SERPL: 27 (ref 7–25)
CALCIUM SPEC-SCNC: 8.2 MG/DL (ref 8.6–10.5)
CHLORIDE SERPL-SCNC: 111 MMOL/L (ref 98–107)
CO2 SERPL-SCNC: 22 MMOL/L (ref 22–29)
CREAT SERPL-MCNC: 1.89 MG/DL (ref 0.76–1.27)
DEPRECATED RDW RBC AUTO: 46.2 FL (ref 37–54)
DIGOXIN SERPL-MCNC: 1.1 NG/ML (ref 0.6–1.2)
EOSINOPHIL # BLD AUTO: 0.12 10*3/MM3 (ref 0–0.4)
EOSINOPHIL NFR BLD AUTO: 2 % (ref 0.3–6.2)
ERYTHROCYTE [DISTWIDTH] IN BLOOD BY AUTOMATED COUNT: 17.2 % (ref 12.3–15.4)
GFR SERPL CREATININE-BSD FRML MDRD: 34 ML/MIN/1.73
GLOBULIN UR ELPH-MCNC: 3.1 GM/DL
GLUCOSE BLDC GLUCOMTR-MCNC: 26 MG/DL (ref 70–130)
GLUCOSE BLDC GLUCOMTR-MCNC: 91 MG/DL (ref 70–130)
GLUCOSE SERPL-MCNC: 122 MG/DL (ref 65–99)
HCT VFR BLD AUTO: 27.4 % (ref 37.5–51)
HGB BLD-MCNC: 7.9 G/DL (ref 13–17.7)
INR PPP: 3.34 (ref 0.9–1.1)
IRON 24H UR-MRATE: 21 MCG/DL (ref 59–158)
LYMPHOCYTES # BLD AUTO: 0.69 10*3/MM3 (ref 0.7–3.1)
LYMPHOCYTES NFR BLD AUTO: 11.2 % (ref 19.6–45.3)
MCH RBC QN AUTO: 21.4 PG (ref 26.6–33)
MCHC RBC AUTO-ENTMCNC: 28.8 G/DL (ref 31.5–35.7)
MCV RBC AUTO: 74.3 FL (ref 79–97)
MONOCYTES # BLD AUTO: 0.53 10*3/MM3 (ref 0.1–0.9)
MONOCYTES NFR BLD AUTO: 8.6 % (ref 5–12)
NEUTROPHILS NFR BLD AUTO: 4.75 10*3/MM3 (ref 1.7–7)
NEUTROPHILS NFR BLD AUTO: 77.4 % (ref 42.7–76)
NT-PROBNP SERPL-MCNC: 1433 PG/ML (ref 0–1800)
PLATELET # BLD AUTO: 167 10*3/MM3 (ref 140–450)
POTASSIUM SERPL-SCNC: 4.7 MMOL/L (ref 3.5–5.2)
PROCALCITONIN SERPL-MCNC: 0.09 NG/ML (ref 0–0.25)
PROT SERPL-MCNC: 6.7 G/DL (ref 6–8.5)
PROTHROMBIN TIME: 33.6 SECONDS (ref 11.7–14.2)
RBC # BLD AUTO: 3.69 10*6/MM3 (ref 4.14–5.8)
RH BLD: POSITIVE
SARS-COV-2 RNA PNL SPEC NAA+PROBE: NOT DETECTED
SODIUM SERPL-SCNC: 144 MMOL/L (ref 136–145)
T&S EXPIRATION DATE: NORMAL
TROPONIN T SERPL-MCNC: 0.01 NG/ML (ref 0–0.03)
WBC # BLD AUTO: 6.14 10*3/MM3 (ref 3.4–10.8)

## 2021-10-23 PROCEDURE — 36430 TRANSFUSION BLD/BLD COMPNT: CPT

## 2021-10-23 PROCEDURE — 85025 COMPLETE CBC W/AUTO DIFF WBC: CPT | Performed by: EMERGENCY MEDICINE

## 2021-10-23 PROCEDURE — 82962 GLUCOSE BLOOD TEST: CPT

## 2021-10-23 PROCEDURE — 80053 COMPREHEN METABOLIC PANEL: CPT | Performed by: EMERGENCY MEDICINE

## 2021-10-23 PROCEDURE — 99285 EMERGENCY DEPT VISIT HI MDM: CPT

## 2021-10-23 PROCEDURE — 86850 RBC ANTIBODY SCREEN: CPT | Performed by: EMERGENCY MEDICINE

## 2021-10-23 PROCEDURE — 83540 ASSAY OF IRON: CPT | Performed by: INTERNAL MEDICINE

## 2021-10-23 PROCEDURE — 85610 PROTHROMBIN TIME: CPT | Performed by: EMERGENCY MEDICINE

## 2021-10-23 PROCEDURE — P9016 RBC LEUKOCYTES REDUCED: HCPCS

## 2021-10-23 PROCEDURE — 93010 ELECTROCARDIOGRAM REPORT: CPT | Performed by: INTERNAL MEDICINE

## 2021-10-23 PROCEDURE — 84145 PROCALCITONIN (PCT): CPT | Performed by: EMERGENCY MEDICINE

## 2021-10-23 PROCEDURE — 70450 CT HEAD/BRAIN W/O DYE: CPT

## 2021-10-23 PROCEDURE — 71045 X-RAY EXAM CHEST 1 VIEW: CPT

## 2021-10-23 PROCEDURE — 86900 BLOOD TYPING SEROLOGIC ABO: CPT | Performed by: EMERGENCY MEDICINE

## 2021-10-23 PROCEDURE — 86900 BLOOD TYPING SEROLOGIC ABO: CPT

## 2021-10-23 PROCEDURE — 86923 COMPATIBILITY TEST ELECTRIC: CPT

## 2021-10-23 PROCEDURE — 80162 ASSAY OF DIGOXIN TOTAL: CPT | Performed by: EMERGENCY MEDICINE

## 2021-10-23 PROCEDURE — 83880 ASSAY OF NATRIURETIC PEPTIDE: CPT | Performed by: EMERGENCY MEDICINE

## 2021-10-23 PROCEDURE — 93005 ELECTROCARDIOGRAM TRACING: CPT | Performed by: EMERGENCY MEDICINE

## 2021-10-23 PROCEDURE — 86901 BLOOD TYPING SEROLOGIC RH(D): CPT

## 2021-10-23 PROCEDURE — 85730 THROMBOPLASTIN TIME PARTIAL: CPT | Performed by: EMERGENCY MEDICINE

## 2021-10-23 PROCEDURE — 84484 ASSAY OF TROPONIN QUANT: CPT | Performed by: EMERGENCY MEDICINE

## 2021-10-23 PROCEDURE — 86901 BLOOD TYPING SEROLOGIC RH(D): CPT | Performed by: EMERGENCY MEDICINE

## 2021-10-23 PROCEDURE — 25010000002 FUROSEMIDE PER 20 MG: Performed by: EMERGENCY MEDICINE

## 2021-10-23 PROCEDURE — 87635 SARS-COV-2 COVID-19 AMP PRB: CPT | Performed by: EMERGENCY MEDICINE

## 2021-10-23 RX ORDER — MAGNESIUM OXIDE 400 MG/1
400 TABLET ORAL 2 TIMES DAILY
Status: DISCONTINUED | OUTPATIENT
Start: 2021-10-23 | End: 2021-10-30 | Stop reason: HOSPADM

## 2021-10-23 RX ORDER — SODIUM BICARBONATE 650 MG/1
1950 TABLET ORAL 2 TIMES DAILY
COMMUNITY
End: 2021-11-16 | Stop reason: HOSPADM

## 2021-10-23 RX ORDER — INSULIN LISPRO 100 [IU]/ML
0-9 INJECTION, SOLUTION INTRAVENOUS; SUBCUTANEOUS
Status: DISCONTINUED | OUTPATIENT
Start: 2021-10-24 | End: 2021-10-24

## 2021-10-23 RX ORDER — ONDANSETRON 2 MG/ML
4 INJECTION INTRAMUSCULAR; INTRAVENOUS EVERY 6 HOURS PRN
Status: DISCONTINUED | OUTPATIENT
Start: 2021-10-23 | End: 2021-10-30 | Stop reason: HOSPADM

## 2021-10-23 RX ORDER — SODIUM CHLORIDE 0.9 % (FLUSH) 0.9 %
10 SYRINGE (ML) INJECTION EVERY 12 HOURS SCHEDULED
Status: DISCONTINUED | OUTPATIENT
Start: 2021-10-23 | End: 2021-10-30 | Stop reason: HOSPADM

## 2021-10-23 RX ORDER — ASPIRIN 325 MG
325 TABLET ORAL ONCE
Status: COMPLETED | OUTPATIENT
Start: 2021-10-23 | End: 2021-10-23

## 2021-10-23 RX ORDER — ASPIRIN 81 MG/1
81 TABLET ORAL DAILY
Status: DISCONTINUED | OUTPATIENT
Start: 2021-10-24 | End: 2021-10-30 | Stop reason: HOSPADM

## 2021-10-23 RX ORDER — WARFARIN SODIUM 5 MG/1
5 TABLET ORAL
Status: DISCONTINUED | OUTPATIENT
Start: 2021-10-24 | End: 2021-10-23

## 2021-10-23 RX ORDER — METOPROLOL SUCCINATE 25 MG/1
25 TABLET, EXTENDED RELEASE ORAL DAILY
Status: DISCONTINUED | OUTPATIENT
Start: 2021-10-24 | End: 2021-10-25

## 2021-10-23 RX ORDER — ATORVASTATIN CALCIUM 20 MG/1
40 TABLET, FILM COATED ORAL DAILY
Status: DISCONTINUED | OUTPATIENT
Start: 2021-10-24 | End: 2021-10-30 | Stop reason: HOSPADM

## 2021-10-23 RX ORDER — FUROSEMIDE 10 MG/ML
60 INJECTION INTRAMUSCULAR; INTRAVENOUS ONCE
Status: COMPLETED | OUTPATIENT
Start: 2021-10-23 | End: 2021-10-23

## 2021-10-23 RX ORDER — FUROSEMIDE 10 MG/ML
60 INJECTION INTRAMUSCULAR; INTRAVENOUS DAILY
Status: DISCONTINUED | OUTPATIENT
Start: 2021-10-24 | End: 2021-10-25

## 2021-10-23 RX ORDER — SODIUM CHLORIDE 9 MG/ML
75 INJECTION, SOLUTION INTRAVENOUS CONTINUOUS
Status: DISCONTINUED | OUTPATIENT
Start: 2021-10-23 | End: 2021-10-23

## 2021-10-23 RX ORDER — NICOTINE POLACRILEX 4 MG
15 LOZENGE BUCCAL
Status: DISCONTINUED | OUTPATIENT
Start: 2021-10-23 | End: 2021-10-30 | Stop reason: HOSPADM

## 2021-10-23 RX ORDER — DEXTROSE MONOHYDRATE 25 G/50ML
25 INJECTION, SOLUTION INTRAVENOUS
Status: DISCONTINUED | OUTPATIENT
Start: 2021-10-23 | End: 2021-10-30 | Stop reason: HOSPADM

## 2021-10-23 RX ORDER — DIGOXIN 125 MCG
125 TABLET ORAL DAILY
Status: DISCONTINUED | OUTPATIENT
Start: 2021-10-24 | End: 2021-10-29

## 2021-10-23 RX ORDER — GUAIFENESIN 600 MG/1
600 TABLET, EXTENDED RELEASE ORAL EVERY 12 HOURS
Status: DISCONTINUED | OUTPATIENT
Start: 2021-10-23 | End: 2021-10-30 | Stop reason: HOSPADM

## 2021-10-23 RX ORDER — SODIUM CHLORIDE 0.9 % (FLUSH) 0.9 %
10 SYRINGE (ML) INJECTION AS NEEDED
Status: DISCONTINUED | OUTPATIENT
Start: 2021-10-23 | End: 2021-10-30 | Stop reason: HOSPADM

## 2021-10-23 RX ORDER — WARFARIN SODIUM 1 MG/1
1 TABLET ORAL
Status: DISCONTINUED | OUTPATIENT
Start: 2021-10-23 | End: 2021-10-23

## 2021-10-23 RX ADMIN — FUROSEMIDE 60 MG: 20 INJECTION, SOLUTION INTRAMUSCULAR; INTRAVENOUS at 17:32

## 2021-10-23 RX ADMIN — ASPIRIN 325 MG: 325 TABLET ORAL at 17:31

## 2021-10-24 ENCOUNTER — APPOINTMENT (OUTPATIENT)
Dept: CARDIOLOGY | Facility: HOSPITAL | Age: 86
End: 2021-10-24

## 2021-10-24 ENCOUNTER — APPOINTMENT (OUTPATIENT)
Dept: MRI IMAGING | Facility: HOSPITAL | Age: 86
End: 2021-10-24

## 2021-10-24 PROBLEM — J90 PLEURAL EFFUSION ON RIGHT: Status: ACTIVE | Noted: 2021-10-24

## 2021-10-24 PROBLEM — R53.1 LEFT-SIDED WEAKNESS: Status: ACTIVE | Noted: 2021-10-24

## 2021-10-24 LAB
ALBUMIN SERPL-MCNC: 3.5 G/DL (ref 3.5–5.2)
ALBUMIN/GLOB SERPL: 1.1 G/DL
ALP SERPL-CCNC: 99 U/L (ref 39–117)
ALT SERPL W P-5'-P-CCNC: 15 U/L (ref 1–41)
ANION GAP SERPL CALCULATED.3IONS-SCNC: 11.5 MMOL/L (ref 5–15)
AORTIC DIMENSIONLESS INDEX: 0.5 (DI)
ASCENDING AORTA: 2.8 CM
AST SERPL-CCNC: 18 U/L (ref 1–40)
BH BB BLOOD EXPIRATION DATE: NORMAL
BH BB BLOOD TYPE BARCODE: 5100
BH BB DISPENSE STATUS: NORMAL
BH BB PRODUCT CODE: NORMAL
BH BB UNIT NUMBER: NORMAL
BH CV ECHO MEAS - AO MAX PG (FULL): 20.7 MMHG
BH CV ECHO MEAS - AO MAX PG: 24.6 MMHG
BH CV ECHO MEAS - AO MEAN PG (FULL): 10 MMHG
BH CV ECHO MEAS - AO MEAN PG: 12 MMHG
BH CV ECHO MEAS - AO V2 MAX: 248 CM/SEC
BH CV ECHO MEAS - AO V2 MEAN: 161 CM/SEC
BH CV ECHO MEAS - AO V2 VTI: 48.3 CM
BH CV ECHO MEAS - ASC AORTA: 2.8 CM
BH CV ECHO MEAS - AVA(I,A): 1.6 CM^2
BH CV ECHO MEAS - AVA(I,D): 1.6 CM^2
BH CV ECHO MEAS - AVA(V,A): 1.5 CM^2
BH CV ECHO MEAS - AVA(V,D): 1.5 CM^2
BH CV ECHO MEAS - BSA(HAYCOCK): 2.1 M^2
BH CV ECHO MEAS - BSA: 2.1 M^2
BH CV ECHO MEAS - BZI_BMI: 25.5 KILOGRAMS/M^2
BH CV ECHO MEAS - BZI_METRIC_HEIGHT: 182.9 CM
BH CV ECHO MEAS - BZI_METRIC_WEIGHT: 85.3 KG
BH CV ECHO MEAS - EDV(CUBED): 97.3 ML
BH CV ECHO MEAS - EDV(MOD-SP2): 93 ML
BH CV ECHO MEAS - EDV(MOD-SP4): 82 ML
BH CV ECHO MEAS - EDV(TEICH): 97.3 ML
BH CV ECHO MEAS - EF(CUBED): 48 %
BH CV ECHO MEAS - EF(MOD-BP): 61.1 %
BH CV ECHO MEAS - EF(MOD-SP2): 61.3 %
BH CV ECHO MEAS - EF(MOD-SP4): 59.8 %
BH CV ECHO MEAS - EF(TEICH): 40.3 %
BH CV ECHO MEAS - ESV(CUBED): 50.7 ML
BH CV ECHO MEAS - ESV(MOD-SP2): 36 ML
BH CV ECHO MEAS - ESV(MOD-SP4): 33 ML
BH CV ECHO MEAS - ESV(TEICH): 58.1 ML
BH CV ECHO MEAS - FS: 19.6 %
BH CV ECHO MEAS - IVS/LVPW: 1.2
BH CV ECHO MEAS - IVSD: 1.3 CM
BH CV ECHO MEAS - LV DIASTOLIC VOL/BSA (35-75): 39.5 ML/M^2
BH CV ECHO MEAS - LV MASS(C)D: 198.9 GRAMS
BH CV ECHO MEAS - LV MASS(C)DI: 95.9 GRAMS/M^2
BH CV ECHO MEAS - LV MAX PG: 3.9 MMHG
BH CV ECHO MEAS - LV MEAN PG: 2 MMHG
BH CV ECHO MEAS - LV SYSTOLIC VOL/BSA (12-30): 15.9 ML/M^2
BH CV ECHO MEAS - LV V1 MAX: 98.3 CM/SEC
BH CV ECHO MEAS - LV V1 MEAN: 67.3 CM/SEC
BH CV ECHO MEAS - LV V1 VTI: 20.3 CM
BH CV ECHO MEAS - LVIDD: 4.6 CM
BH CV ECHO MEAS - LVIDS: 3.7 CM
BH CV ECHO MEAS - LVLD AP2: 7.1 CM
BH CV ECHO MEAS - LVLD AP4: 7.6 CM
BH CV ECHO MEAS - LVLS AP2: 5.8 CM
BH CV ECHO MEAS - LVLS AP4: 6 CM
BH CV ECHO MEAS - LVOT AREA (M): 3.8 CM^2
BH CV ECHO MEAS - LVOT AREA: 3.8 CM^2
BH CV ECHO MEAS - LVOT DIAM: 2.2 CM
BH CV ECHO MEAS - LVPWD: 1.1 CM
BH CV ECHO MEAS - MV DEC SLOPE: 540.5 CM/SEC^2
BH CV ECHO MEAS - MV MAX PG: 7.3 MMHG
BH CV ECHO MEAS - MV MEAN PG: 3 MMHG
BH CV ECHO MEAS - MV P1/2T MAX VEL: 144.5 CM/SEC
BH CV ECHO MEAS - MV P1/2T: 78.3 MSEC
BH CV ECHO MEAS - MV V2 MAX: 135.5 CM/SEC
BH CV ECHO MEAS - MV V2 MEAN: 73.2 CM/SEC
BH CV ECHO MEAS - MV V2 VTI: 34.9 CM
BH CV ECHO MEAS - MVA P1/2T LCG: 1.5 CM^2
BH CV ECHO MEAS - MVA(P1/2T): 2.8 CM^2
BH CV ECHO MEAS - MVA(VTI): 2.2 CM^2
BH CV ECHO MEAS - PA ACC TIME: 0.06 SEC
BH CV ECHO MEAS - PA MAX PG (FULL): 2.1 MMHG
BH CV ECHO MEAS - PA MAX PG: 3.5 MMHG
BH CV ECHO MEAS - PA PR(ACCEL): 50.7 MMHG
BH CV ECHO MEAS - PA V2 MAX: 93.4 CM/SEC
BH CV ECHO MEAS - RAP SYSTOLE: 15 MMHG
BH CV ECHO MEAS - RV MAX PG: 1.4 MMHG
BH CV ECHO MEAS - RV MEAN PG: 1 MMHG
BH CV ECHO MEAS - RV V1 MAX: 59.4 CM/SEC
BH CV ECHO MEAS - RV V1 MEAN: 37.6 CM/SEC
BH CV ECHO MEAS - RV V1 VTI: 11.9 CM
BH CV ECHO MEAS - RVSP: 63.7 MMHG
BH CV ECHO MEAS - SI(CUBED): 22.5 ML/M^2
BH CV ECHO MEAS - SI(LVOT): 37.2 ML/M^2
BH CV ECHO MEAS - SI(MOD-SP2): 27.5 ML/M^2
BH CV ECHO MEAS - SI(MOD-SP4): 23.6 ML/M^2
BH CV ECHO MEAS - SI(TEICH): 18.9 ML/M^2
BH CV ECHO MEAS - SV(CUBED): 46.7 ML
BH CV ECHO MEAS - SV(LVOT): 77.2 ML
BH CV ECHO MEAS - SV(MOD-SP2): 57 ML
BH CV ECHO MEAS - SV(MOD-SP4): 49 ML
BH CV ECHO MEAS - SV(TEICH): 39.2 ML
BH CV ECHO MEAS - TAPSE (>1.6): 1.7 CM
BH CV ECHO MEAS - TR MAX VEL: 349 CM/SEC
BH CV VAS BP RIGHT ARM: NORMAL MMHG
BH CV XLRA - RV BASE: 4.2 CM
BH CV XLRA - RV LENGTH: 7.2 CM
BH CV XLRA - RV MID: 2.7 CM
BH CV XLRA - TDI S': 11.9 CM/SEC
BILIRUB SERPL-MCNC: 0.6 MG/DL (ref 0–1.2)
BUN SERPL-MCNC: 47 MG/DL (ref 8–23)
BUN/CREAT SERPL: 26.9 (ref 7–25)
CALCIUM SPEC-SCNC: 8.4 MG/DL (ref 8.6–10.5)
CHLORIDE SERPL-SCNC: 110 MMOL/L (ref 98–107)
CHOLEST SERPL-MCNC: 80 MG/DL (ref 0–200)
CO2 SERPL-SCNC: 22.5 MMOL/L (ref 22–29)
CREAT SERPL-MCNC: 1.75 MG/DL (ref 0.76–1.27)
CROSSMATCH INTERPRETATION: NORMAL
DEPRECATED RDW RBC AUTO: 49.6 FL (ref 37–54)
ERYTHROCYTE [DISTWIDTH] IN BLOOD BY AUTOMATED COUNT: 17.6 % (ref 12.3–15.4)
FOLATE SERPL-MCNC: 9.93 NG/ML (ref 4.78–24.2)
GFR SERPL CREATININE-BSD FRML MDRD: 37 ML/MIN/1.73
GLOBULIN UR ELPH-MCNC: 3.3 GM/DL
GLUCOSE BLDC GLUCOMTR-MCNC: 122 MG/DL (ref 70–130)
GLUCOSE BLDC GLUCOMTR-MCNC: 153 MG/DL (ref 70–130)
GLUCOSE BLDC GLUCOMTR-MCNC: 173 MG/DL (ref 70–130)
GLUCOSE BLDC GLUCOMTR-MCNC: 61 MG/DL (ref 70–130)
GLUCOSE BLDC GLUCOMTR-MCNC: 65 MG/DL (ref 70–130)
GLUCOSE BLDC GLUCOMTR-MCNC: 95 MG/DL (ref 70–130)
GLUCOSE SERPL-MCNC: 51 MG/DL (ref 65–99)
HBA1C MFR BLD: 5.2 % (ref 4.8–5.6)
HCT VFR BLD AUTO: 33.4 % (ref 37.5–51)
HDLC SERPL-MCNC: 38 MG/DL (ref 40–60)
HEMOCCULT STL QL: POSITIVE
HGB BLD-MCNC: 9.2 G/DL (ref 13–17.7)
INR PPP: 3.52 (ref 0.9–1.1)
IRON 24H UR-MRATE: 18 MCG/DL (ref 59–158)
IRON SATN MFR SERPL: 5 % (ref 20–50)
LDLC SERPL CALC-MCNC: 28 MG/DL (ref 0–100)
LDLC/HDLC SERPL: 0.81 {RATIO}
LEFT ATRIUM VOLUME INDEX: 43 ML/M2
LV EF 2D ECHO EST: 60 %
MAXIMAL PREDICTED HEART RATE: 137 BPM
MCH RBC QN AUTO: 21.4 PG (ref 26.6–33)
MCHC RBC AUTO-ENTMCNC: 27.5 G/DL (ref 31.5–35.7)
MCV RBC AUTO: 77.9 FL (ref 79–97)
PLATELET # BLD AUTO: 171 10*3/MM3 (ref 140–450)
POTASSIUM SERPL-SCNC: 4.3 MMOL/L (ref 3.5–5.2)
PROT SERPL-MCNC: 6.8 G/DL (ref 6–8.5)
PROTHROMBIN TIME: 35 SECONDS (ref 11.7–14.2)
QT INTERVAL: 413 MS
RBC # BLD AUTO: 4.29 10*6/MM3 (ref 4.14–5.8)
RETICS # AUTO: 0.08 10*6/MM3 (ref 0.02–0.13)
RETICS/RBC NFR AUTO: 1.8 % (ref 0.7–1.9)
SODIUM SERPL-SCNC: 144 MMOL/L (ref 136–145)
STRESS TARGET HR: 116 BPM
TIBC SERPL-MCNC: 375 MCG/DL (ref 298–536)
TRANSFERRIN SERPL-MCNC: 252 MG/DL (ref 200–360)
TRIGL SERPL-MCNC: 57 MG/DL (ref 0–150)
UNIT  ABO: NORMAL
UNIT  RH: NORMAL
VIT B12 BLD-MCNC: 483 PG/ML (ref 211–946)
VLDLC SERPL-MCNC: 14 MG/DL (ref 5–40)
WBC # BLD AUTO: 6.48 10*3/MM3 (ref 3.4–10.8)

## 2021-10-24 PROCEDURE — 82746 ASSAY OF FOLIC ACID SERUM: CPT | Performed by: INTERNAL MEDICINE

## 2021-10-24 PROCEDURE — 82962 GLUCOSE BLOOD TEST: CPT

## 2021-10-24 PROCEDURE — 93306 TTE W/DOPPLER COMPLETE: CPT | Performed by: INTERNAL MEDICINE

## 2021-10-24 PROCEDURE — 85610 PROTHROMBIN TIME: CPT | Performed by: INTERNAL MEDICINE

## 2021-10-24 PROCEDURE — 85045 AUTOMATED RETICULOCYTE COUNT: CPT | Performed by: INTERNAL MEDICINE

## 2021-10-24 PROCEDURE — 93306 TTE W/DOPPLER COMPLETE: CPT

## 2021-10-24 PROCEDURE — 70551 MRI BRAIN STEM W/O DYE: CPT

## 2021-10-24 PROCEDURE — 82272 OCCULT BLD FECES 1-3 TESTS: CPT | Performed by: INTERNAL MEDICINE

## 2021-10-24 PROCEDURE — 82607 VITAMIN B-12: CPT | Performed by: INTERNAL MEDICINE

## 2021-10-24 PROCEDURE — 84466 ASSAY OF TRANSFERRIN: CPT | Performed by: INTERNAL MEDICINE

## 2021-10-24 PROCEDURE — 83540 ASSAY OF IRON: CPT | Performed by: INTERNAL MEDICINE

## 2021-10-24 PROCEDURE — 25010000002 PERFLUTREN (DEFINITY) 8.476 MG IN SODIUM CHLORIDE (PF) 0.9 % 10 ML INJECTION: Performed by: INTERNAL MEDICINE

## 2021-10-24 PROCEDURE — 25010000002 FUROSEMIDE PER 20 MG: Performed by: INTERNAL MEDICINE

## 2021-10-24 PROCEDURE — 63710000001 INSULIN LISPRO (HUMAN) PER 5 UNITS: Performed by: HOSPITALIST

## 2021-10-24 PROCEDURE — 97161 PT EVAL LOW COMPLEX 20 MIN: CPT

## 2021-10-24 PROCEDURE — 99222 1ST HOSP IP/OBS MODERATE 55: CPT | Performed by: INTERNAL MEDICINE

## 2021-10-24 PROCEDURE — 97110 THERAPEUTIC EXERCISES: CPT

## 2021-10-24 PROCEDURE — 83036 HEMOGLOBIN GLYCOSYLATED A1C: CPT | Performed by: INTERNAL MEDICINE

## 2021-10-24 PROCEDURE — 80061 LIPID PANEL: CPT | Performed by: INTERNAL MEDICINE

## 2021-10-24 PROCEDURE — 99222 1ST HOSP IP/OBS MODERATE 55: CPT | Performed by: NURSE PRACTITIONER

## 2021-10-24 PROCEDURE — 85027 COMPLETE CBC AUTOMATED: CPT | Performed by: INTERNAL MEDICINE

## 2021-10-24 PROCEDURE — 80053 COMPREHEN METABOLIC PANEL: CPT | Performed by: INTERNAL MEDICINE

## 2021-10-24 RX ORDER — INSULIN LISPRO 100 [IU]/ML
0-7 INJECTION, SOLUTION INTRAVENOUS; SUBCUTANEOUS
Status: DISCONTINUED | OUTPATIENT
Start: 2021-10-24 | End: 2021-10-30 | Stop reason: HOSPADM

## 2021-10-24 RX ADMIN — DIGOXIN 125 MCG: 125 TABLET ORAL at 09:03

## 2021-10-24 RX ADMIN — GUAIFENESIN 600 MG: 600 TABLET, EXTENDED RELEASE ORAL at 00:34

## 2021-10-24 RX ADMIN — GUAIFENESIN 600 MG: 600 TABLET, EXTENDED RELEASE ORAL at 09:03

## 2021-10-24 RX ADMIN — FUROSEMIDE 60 MG: 20 INJECTION, SOLUTION INTRAMUSCULAR; INTRAVENOUS at 09:03

## 2021-10-24 RX ADMIN — SODIUM CHLORIDE, PRESERVATIVE FREE 10 ML: 5 INJECTION INTRAVENOUS at 00:33

## 2021-10-24 RX ADMIN — PERFLUTREN 1 ML: 6.52 INJECTION, SUSPENSION INTRAVENOUS at 14:29

## 2021-10-24 RX ADMIN — INSULIN LISPRO 2 UNITS: 100 INJECTION, SOLUTION INTRAVENOUS; SUBCUTANEOUS at 17:27

## 2021-10-24 RX ADMIN — GUAIFENESIN 600 MG: 600 TABLET, EXTENDED RELEASE ORAL at 20:57

## 2021-10-24 RX ADMIN — MAGNESIUM OXIDE 400 MG (241.3 MG MAGNESIUM) TABLET 400 MG: TABLET at 09:03

## 2021-10-24 RX ADMIN — METOPROLOL SUCCINATE 25 MG: 25 TABLET, EXTENDED RELEASE ORAL at 09:03

## 2021-10-24 RX ADMIN — ATORVASTATIN CALCIUM 40 MG: 20 TABLET, FILM COATED ORAL at 09:03

## 2021-10-24 RX ADMIN — MAGNESIUM OXIDE 400 MG (241.3 MG MAGNESIUM) TABLET 400 MG: TABLET at 20:57

## 2021-10-24 RX ADMIN — SODIUM CHLORIDE, PRESERVATIVE FREE 10 ML: 5 INJECTION INTRAVENOUS at 20:58

## 2021-10-24 RX ADMIN — ASPIRIN 81 MG: 81 TABLET, COATED ORAL at 09:03

## 2021-10-24 RX ADMIN — MAGNESIUM OXIDE 400 MG (241.3 MG MAGNESIUM) TABLET 400 MG: TABLET at 00:34

## 2021-10-24 RX ADMIN — SODIUM CHLORIDE, PRESERVATIVE FREE 10 ML: 5 INJECTION INTRAVENOUS at 09:06

## 2021-10-25 ENCOUNTER — APPOINTMENT (OUTPATIENT)
Dept: ULTRASOUND IMAGING | Facility: HOSPITAL | Age: 86
End: 2021-10-25

## 2021-10-25 ENCOUNTER — APPOINTMENT (OUTPATIENT)
Dept: CT IMAGING | Facility: HOSPITAL | Age: 86
End: 2021-10-25

## 2021-10-25 LAB
APPEARANCE FLD: ABNORMAL
COLOR FLD: YELLOW
EOSINOPHIL NFR FLD MANUAL: 1 %
GLUCOSE BLDC GLUCOMTR-MCNC: 120 MG/DL (ref 70–130)
GLUCOSE BLDC GLUCOMTR-MCNC: 159 MG/DL (ref 70–130)
GLUCOSE BLDC GLUCOMTR-MCNC: 160 MG/DL (ref 70–130)
GLUCOSE BLDC GLUCOMTR-MCNC: 170 MG/DL (ref 70–130)
GLUCOSE FLD-MCNC: 183 MG/DL
INR PPP: 2.31 (ref 0.9–1.1)
LDH FLD-CCNC: 83 U/L
LYMPHOCYTES NFR FLD MANUAL: 16 %
METHOD: ABNORMAL
MONOCYTES NFR FLD: 12 %
MONOS+MACROS NFR FLD: 46 %
NEUTROPHILS NFR FLD MANUAL: 25 %
NUC CELL # FLD: 152 /MM3
PH FLD: 7.68 [PH]
PROT FLD-MCNC: 2.1 G/DL
PROTHROMBIN TIME: 25.2 SECONDS (ref 11.7–14.2)
RBC # FLD AUTO: 1957 /MM3

## 2021-10-25 PROCEDURE — 82945 GLUCOSE OTHER FLUID: CPT | Performed by: INTERNAL MEDICINE

## 2021-10-25 PROCEDURE — 0 LIDOCAINE 1 % SOLUTION: Performed by: RADIOLOGY

## 2021-10-25 PROCEDURE — 89051 BODY FLUID CELL COUNT: CPT | Performed by: INTERNAL MEDICINE

## 2021-10-25 PROCEDURE — 99232 SBSQ HOSP IP/OBS MODERATE 35: CPT | Performed by: INTERNAL MEDICINE

## 2021-10-25 PROCEDURE — 82962 GLUCOSE BLOOD TEST: CPT

## 2021-10-25 PROCEDURE — 88305 TISSUE EXAM BY PATHOLOGIST: CPT | Performed by: INTERNAL MEDICINE

## 2021-10-25 PROCEDURE — 25010000002 FUROSEMIDE PER 20 MG: Performed by: INTERNAL MEDICINE

## 2021-10-25 PROCEDURE — 87070 CULTURE OTHR SPECIMN AEROBIC: CPT | Performed by: INTERNAL MEDICINE

## 2021-10-25 PROCEDURE — 99232 SBSQ HOSP IP/OBS MODERATE 35: CPT | Performed by: NURSE PRACTITIONER

## 2021-10-25 PROCEDURE — 63710000001 INSULIN LISPRO (HUMAN) PER 5 UNITS: Performed by: HOSPITALIST

## 2021-10-25 PROCEDURE — 88112 CYTOPATH CELL ENHANCE TECH: CPT | Performed by: INTERNAL MEDICINE

## 2021-10-25 PROCEDURE — 76942 ECHO GUIDE FOR BIOPSY: CPT

## 2021-10-25 PROCEDURE — 99222 1ST HOSP IP/OBS MODERATE 55: CPT | Performed by: INTERNAL MEDICINE

## 2021-10-25 PROCEDURE — 85610 PROTHROMBIN TIME: CPT | Performed by: INTERNAL MEDICINE

## 2021-10-25 PROCEDURE — 83986 ASSAY PH BODY FLUID NOS: CPT | Performed by: INTERNAL MEDICINE

## 2021-10-25 PROCEDURE — 0W993ZZ DRAINAGE OF RIGHT PLEURAL CAVITY, PERCUTANEOUS APPROACH: ICD-10-PCS | Performed by: RADIOLOGY

## 2021-10-25 PROCEDURE — 71250 CT THORAX DX C-: CPT

## 2021-10-25 PROCEDURE — 84157 ASSAY OF PROTEIN OTHER: CPT | Performed by: INTERNAL MEDICINE

## 2021-10-25 PROCEDURE — 83615 LACTATE (LD) (LDH) ENZYME: CPT | Performed by: INTERNAL MEDICINE

## 2021-10-25 PROCEDURE — 87205 SMEAR GRAM STAIN: CPT | Performed by: INTERNAL MEDICINE

## 2021-10-25 RX ORDER — WARFARIN SODIUM 7.5 MG/1
7.5 TABLET ORAL
Status: DISCONTINUED | OUTPATIENT
Start: 2021-10-25 | End: 2021-10-25

## 2021-10-25 RX ORDER — LIDOCAINE HYDROCHLORIDE 10 MG/ML
10 INJECTION, SOLUTION INFILTRATION; PERINEURAL ONCE
Status: COMPLETED | OUTPATIENT
Start: 2021-10-25 | End: 2021-10-25

## 2021-10-25 RX ORDER — SPIRONOLACTONE 25 MG/1
25 TABLET ORAL DAILY
Status: DISCONTINUED | OUTPATIENT
Start: 2021-10-25 | End: 2021-10-30 | Stop reason: HOSPADM

## 2021-10-25 RX ORDER — METOPROLOL SUCCINATE 50 MG/1
50 TABLET, EXTENDED RELEASE ORAL DAILY
Status: DISCONTINUED | OUTPATIENT
Start: 2021-10-26 | End: 2021-10-30 | Stop reason: HOSPADM

## 2021-10-25 RX ORDER — FUROSEMIDE 10 MG/ML
40 INJECTION INTRAMUSCULAR; INTRAVENOUS
Status: DISCONTINUED | OUTPATIENT
Start: 2021-10-25 | End: 2021-10-26

## 2021-10-25 RX ADMIN — LIDOCAINE HYDROCHLORIDE 7 ML: 10 INJECTION, SOLUTION INFILTRATION; PERINEURAL at 16:35

## 2021-10-25 RX ADMIN — INSULIN LISPRO 2 UNITS: 100 INJECTION, SOLUTION INTRAVENOUS; SUBCUTANEOUS at 18:09

## 2021-10-25 RX ADMIN — SODIUM CHLORIDE, PRESERVATIVE FREE 10 ML: 5 INJECTION INTRAVENOUS at 08:55

## 2021-10-25 RX ADMIN — FUROSEMIDE 60 MG: 20 INJECTION, SOLUTION INTRAMUSCULAR; INTRAVENOUS at 08:56

## 2021-10-25 RX ADMIN — ATORVASTATIN CALCIUM 40 MG: 20 TABLET, FILM COATED ORAL at 08:55

## 2021-10-25 RX ADMIN — ASPIRIN 81 MG: 81 TABLET, COATED ORAL at 08:55

## 2021-10-25 RX ADMIN — GUAIFENESIN 600 MG: 600 TABLET, EXTENDED RELEASE ORAL at 21:56

## 2021-10-25 RX ADMIN — FUROSEMIDE 40 MG: 10 INJECTION, SOLUTION INTRAMUSCULAR; INTRAVENOUS at 18:08

## 2021-10-25 RX ADMIN — MAGNESIUM OXIDE 400 MG (241.3 MG MAGNESIUM) TABLET 400 MG: TABLET at 21:56

## 2021-10-25 RX ADMIN — GUAIFENESIN 600 MG: 600 TABLET, EXTENDED RELEASE ORAL at 10:20

## 2021-10-25 RX ADMIN — SPIRONOLACTONE 25 MG: 25 TABLET ORAL at 15:41

## 2021-10-25 RX ADMIN — INSULIN LISPRO 2 UNITS: 100 INJECTION, SOLUTION INTRAVENOUS; SUBCUTANEOUS at 12:11

## 2021-10-25 RX ADMIN — MAGNESIUM OXIDE 400 MG (241.3 MG MAGNESIUM) TABLET 400 MG: TABLET at 08:54

## 2021-10-25 RX ADMIN — SODIUM CHLORIDE, PRESERVATIVE FREE 10 ML: 5 INJECTION INTRAVENOUS at 21:57

## 2021-10-25 RX ADMIN — METOPROLOL SUCCINATE 25 MG: 25 TABLET, EXTENDED RELEASE ORAL at 08:54

## 2021-10-25 RX ADMIN — DIGOXIN 125 MCG: 125 TABLET ORAL at 08:54

## 2021-10-26 LAB
ANION GAP SERPL CALCULATED.3IONS-SCNC: 10.5 MMOL/L (ref 5–15)
APTT PPP: 38.9 SECONDS (ref 22.7–35.4)
BASOPHILS # BLD AUTO: 0.05 10*3/MM3 (ref 0–0.2)
BASOPHILS NFR BLD AUTO: 0.8 % (ref 0–1.5)
BUN SERPL-MCNC: 40 MG/DL (ref 8–23)
BUN/CREAT SERPL: 22 (ref 7–25)
CALCIUM SPEC-SCNC: 8.2 MG/DL (ref 8.6–10.5)
CHLORIDE SERPL-SCNC: 105 MMOL/L (ref 98–107)
CO2 SERPL-SCNC: 24.5 MMOL/L (ref 22–29)
CREAT SERPL-MCNC: 1.82 MG/DL (ref 0.76–1.27)
DEPRECATED RDW RBC AUTO: 47.1 FL (ref 37–54)
EOSINOPHIL # BLD AUTO: 0.23 10*3/MM3 (ref 0–0.4)
EOSINOPHIL NFR BLD AUTO: 3.6 % (ref 0.3–6.2)
ERYTHROCYTE [DISTWIDTH] IN BLOOD BY AUTOMATED COUNT: 17.6 % (ref 12.3–15.4)
GFR SERPL CREATININE-BSD FRML MDRD: 36 ML/MIN/1.73
GLUCOSE BLDC GLUCOMTR-MCNC: 118 MG/DL (ref 70–130)
GLUCOSE BLDC GLUCOMTR-MCNC: 175 MG/DL (ref 70–130)
GLUCOSE BLDC GLUCOMTR-MCNC: 222 MG/DL (ref 70–130)
GLUCOSE BLDC GLUCOMTR-MCNC: 227 MG/DL (ref 70–130)
GLUCOSE SERPL-MCNC: 191 MG/DL (ref 65–99)
HCT VFR BLD AUTO: 31.2 % (ref 37.5–51)
HGB BLD-MCNC: 9 G/DL (ref 13–17.7)
INR PPP: 1.66 (ref 0.9–1.1)
INR PPP: 1.74 (ref 0.9–1.1)
LYMPHOCYTES # BLD AUTO: 0.84 10*3/MM3 (ref 0.7–3.1)
LYMPHOCYTES NFR BLD AUTO: 13.3 % (ref 19.6–45.3)
MCH RBC QN AUTO: 21.6 PG (ref 26.6–33)
MCHC RBC AUTO-ENTMCNC: 28.8 G/DL (ref 31.5–35.7)
MCV RBC AUTO: 75 FL (ref 79–97)
MONOCYTES # BLD AUTO: 0.63 10*3/MM3 (ref 0.1–0.9)
MONOCYTES NFR BLD AUTO: 10 % (ref 5–12)
NEUTROPHILS NFR BLD AUTO: 4.54 10*3/MM3 (ref 1.7–7)
NEUTROPHILS NFR BLD AUTO: 72 % (ref 42.7–76)
PLATELET # BLD AUTO: 176 10*3/MM3 (ref 140–450)
PMV BLD AUTO: 12.5 FL (ref 6–12)
POTASSIUM SERPL-SCNC: 4.4 MMOL/L (ref 3.5–5.2)
PROTHROMBIN TIME: 19.4 SECONDS (ref 11.7–14.2)
PROTHROMBIN TIME: 20.1 SECONDS (ref 11.7–14.2)
RBC # BLD AUTO: 4.16 10*6/MM3 (ref 4.14–5.8)
SARS-COV-2 RNA RESP QL NAA+PROBE: NOT DETECTED
SODIUM SERPL-SCNC: 140 MMOL/L (ref 136–145)
WBC # BLD AUTO: 6.31 10*3/MM3 (ref 3.4–10.8)

## 2021-10-26 PROCEDURE — 85610 PROTHROMBIN TIME: CPT | Performed by: NURSE PRACTITIONER

## 2021-10-26 PROCEDURE — 85610 PROTHROMBIN TIME: CPT | Performed by: INTERNAL MEDICINE

## 2021-10-26 PROCEDURE — 80048 BASIC METABOLIC PNL TOTAL CA: CPT | Performed by: NURSE PRACTITIONER

## 2021-10-26 PROCEDURE — 63710000001 INSULIN LISPRO (HUMAN) PER 5 UNITS: Performed by: HOSPITALIST

## 2021-10-26 PROCEDURE — 97530 THERAPEUTIC ACTIVITIES: CPT

## 2021-10-26 PROCEDURE — 99232 SBSQ HOSP IP/OBS MODERATE 35: CPT | Performed by: NURSE PRACTITIONER

## 2021-10-26 PROCEDURE — 82962 GLUCOSE BLOOD TEST: CPT

## 2021-10-26 PROCEDURE — 85730 THROMBOPLASTIN TIME PARTIAL: CPT | Performed by: NURSE PRACTITIONER

## 2021-10-26 PROCEDURE — 85025 COMPLETE CBC W/AUTO DIFF WBC: CPT | Performed by: NURSE PRACTITIONER

## 2021-10-26 PROCEDURE — 97166 OT EVAL MOD COMPLEX 45 MIN: CPT

## 2021-10-26 PROCEDURE — U0005 INFEC AGEN DETEC AMPLI PROBE: HCPCS | Performed by: INTERNAL MEDICINE

## 2021-10-26 PROCEDURE — U0003 INFECTIOUS AGENT DETECTION BY NUCLEIC ACID (DNA OR RNA); SEVERE ACUTE RESPIRATORY SYNDROME CORONAVIRUS 2 (SARS-COV-2) (CORONAVIRUS DISEASE [COVID-19]), AMPLIFIED PROBE TECHNIQUE, MAKING USE OF HIGH THROUGHPUT TECHNOLOGIES AS DESCRIBED BY CMS-2020-01-R: HCPCS | Performed by: INTERNAL MEDICINE

## 2021-10-26 PROCEDURE — 99232 SBSQ HOSP IP/OBS MODERATE 35: CPT | Performed by: INTERNAL MEDICINE

## 2021-10-26 PROCEDURE — 25010000002 HEPARIN (PORCINE) PER 1000 UNITS: Performed by: NURSE PRACTITIONER

## 2021-10-26 PROCEDURE — 25010000002 FUROSEMIDE PER 20 MG: Performed by: INTERNAL MEDICINE

## 2021-10-26 PROCEDURE — 97110 THERAPEUTIC EXERCISES: CPT

## 2021-10-26 RX ORDER — FUROSEMIDE 40 MG/1
40 TABLET ORAL DAILY
Status: DISCONTINUED | OUTPATIENT
Start: 2021-10-26 | End: 2021-10-30 | Stop reason: HOSPADM

## 2021-10-26 RX ORDER — HEPARIN SODIUM 10000 [USP'U]/100ML
12 INJECTION, SOLUTION INTRAVENOUS
Status: DISPENSED | OUTPATIENT
Start: 2021-10-26 | End: 2021-10-27

## 2021-10-26 RX ORDER — HEPARIN SODIUM 5000 [USP'U]/ML
30-46.9 INJECTION, SOLUTION INTRAVENOUS; SUBCUTANEOUS EVERY 6 HOURS PRN
Status: DISCONTINUED | OUTPATIENT
Start: 2021-10-26 | End: 2021-10-29

## 2021-10-26 RX ADMIN — FUROSEMIDE 40 MG: 10 INJECTION, SOLUTION INTRAMUSCULAR; INTRAVENOUS at 08:22

## 2021-10-26 RX ADMIN — MAGNESIUM OXIDE 400 MG (241.3 MG MAGNESIUM) TABLET 400 MG: TABLET at 21:56

## 2021-10-26 RX ADMIN — INSULIN LISPRO 2 UNITS: 100 INJECTION, SOLUTION INTRAVENOUS; SUBCUTANEOUS at 00:05

## 2021-10-26 RX ADMIN — SPIRONOLACTONE 25 MG: 25 TABLET ORAL at 08:22

## 2021-10-26 RX ADMIN — POLYETHYLENE GLYCOL 3350, SODIUM SULFATE ANHYDROUS, SODIUM BICARBONATE, SODIUM CHLORIDE, POTASSIUM CHLORIDE 2000 ML: 236; 22.74; 6.74; 5.86; 2.97 POWDER, FOR SOLUTION ORAL at 21:57

## 2021-10-26 RX ADMIN — HEPARIN SODIUM 12 UNITS/KG/HR: 10000 INJECTION, SOLUTION INTRAVENOUS at 17:19

## 2021-10-26 RX ADMIN — METOPROLOL SUCCINATE 50 MG: 50 TABLET, EXTENDED RELEASE ORAL at 08:22

## 2021-10-26 RX ADMIN — GUAIFENESIN 600 MG: 600 TABLET, EXTENDED RELEASE ORAL at 11:42

## 2021-10-26 RX ADMIN — SODIUM CHLORIDE, PRESERVATIVE FREE 10 ML: 5 INJECTION INTRAVENOUS at 08:23

## 2021-10-26 RX ADMIN — INSULIN LISPRO 3 UNITS: 100 INJECTION, SOLUTION INTRAVENOUS; SUBCUTANEOUS at 11:42

## 2021-10-26 RX ADMIN — GUAIFENESIN 600 MG: 600 TABLET, EXTENDED RELEASE ORAL at 21:56

## 2021-10-26 RX ADMIN — ASPIRIN 81 MG: 81 TABLET, COATED ORAL at 08:31

## 2021-10-26 RX ADMIN — MAGNESIUM OXIDE 400 MG (241.3 MG MAGNESIUM) TABLET 400 MG: TABLET at 08:22

## 2021-10-26 RX ADMIN — DIGOXIN 125 MCG: 125 TABLET ORAL at 08:22

## 2021-10-26 RX ADMIN — INSULIN LISPRO 2 UNITS: 100 INJECTION, SOLUTION INTRAVENOUS; SUBCUTANEOUS at 17:18

## 2021-10-26 RX ADMIN — ATORVASTATIN CALCIUM 40 MG: 20 TABLET, FILM COATED ORAL at 08:31

## 2021-10-26 RX ADMIN — FUROSEMIDE 40 MG: 40 TABLET ORAL at 17:17

## 2021-10-26 RX ADMIN — INSULIN LISPRO 3 UNITS: 100 INJECTION, SOLUTION INTRAVENOUS; SUBCUTANEOUS at 21:56

## 2021-10-27 ENCOUNTER — TELEPHONE (OUTPATIENT)
Dept: FAMILY MEDICINE CLINIC | Facility: CLINIC | Age: 86
End: 2021-10-27

## 2021-10-27 ENCOUNTER — APPOINTMENT (OUTPATIENT)
Dept: GENERAL RADIOLOGY | Facility: HOSPITAL | Age: 86
End: 2021-10-27

## 2021-10-27 LAB
ALBUMIN SERPL-MCNC: 3.6 G/DL (ref 3.5–5.2)
ALBUMIN/GLOB SERPL: 1.1 G/DL
ALP SERPL-CCNC: 102 U/L (ref 39–117)
ALT SERPL W P-5'-P-CCNC: 15 U/L (ref 1–41)
ANION GAP SERPL CALCULATED.3IONS-SCNC: 10.3 MMOL/L (ref 5–15)
APTT PPP: 41.5 SECONDS (ref 22.7–35.4)
AST SERPL-CCNC: 18 U/L (ref 1–40)
BASOPHILS # BLD AUTO: 0.04 10*3/MM3 (ref 0–0.2)
BASOPHILS NFR BLD AUTO: 0.6 % (ref 0–1.5)
BILIRUB SERPL-MCNC: 0.8 MG/DL (ref 0–1.2)
BUN SERPL-MCNC: 35 MG/DL (ref 8–23)
BUN/CREAT SERPL: 21.1 (ref 7–25)
CALCIUM SPEC-SCNC: 8.4 MG/DL (ref 8.6–10.5)
CHLORIDE SERPL-SCNC: 102 MMOL/L (ref 98–107)
CO2 SERPL-SCNC: 28.7 MMOL/L (ref 22–29)
CREAT SERPL-MCNC: 1.66 MG/DL (ref 0.76–1.27)
CYTO UR: NORMAL
DEPRECATED RDW RBC AUTO: 46.8 FL (ref 37–54)
EOSINOPHIL # BLD AUTO: 0.24 10*3/MM3 (ref 0–0.4)
EOSINOPHIL NFR BLD AUTO: 3.4 % (ref 0.3–6.2)
ERYTHROCYTE [DISTWIDTH] IN BLOOD BY AUTOMATED COUNT: 17.8 % (ref 12.3–15.4)
GFR SERPL CREATININE-BSD FRML MDRD: 40 ML/MIN/1.73
GLOBULIN UR ELPH-MCNC: 3.3 GM/DL
GLUCOSE BLDC GLUCOMTR-MCNC: 106 MG/DL (ref 70–130)
GLUCOSE BLDC GLUCOMTR-MCNC: 118 MG/DL (ref 70–130)
GLUCOSE BLDC GLUCOMTR-MCNC: 132 MG/DL (ref 70–130)
GLUCOSE BLDC GLUCOMTR-MCNC: 157 MG/DL (ref 70–130)
GLUCOSE SERPL-MCNC: 123 MG/DL (ref 65–99)
HCT VFR BLD AUTO: 34.7 % (ref 37.5–51)
HGB BLD-MCNC: 10 G/DL (ref 13–17.7)
INR PPP: 1.45 (ref 0.9–1.1)
LAB AP CASE REPORT: NORMAL
LYMPHOCYTES # BLD AUTO: 1.2 10*3/MM3 (ref 0.7–3.1)
LYMPHOCYTES NFR BLD AUTO: 16.9 % (ref 19.6–45.3)
MCH RBC QN AUTO: 21.2 PG (ref 26.6–33)
MCHC RBC AUTO-ENTMCNC: 28.8 G/DL (ref 31.5–35.7)
MCV RBC AUTO: 73.7 FL (ref 79–97)
MONOCYTES # BLD AUTO: 0.59 10*3/MM3 (ref 0.1–0.9)
MONOCYTES NFR BLD AUTO: 8.3 % (ref 5–12)
NEUTROPHILS NFR BLD AUTO: 4.99 10*3/MM3 (ref 1.7–7)
NEUTROPHILS NFR BLD AUTO: 70.5 % (ref 42.7–76)
PATH REPORT.FINAL DX SPEC: NORMAL
PATH REPORT.GROSS SPEC: NORMAL
PLATELET # BLD AUTO: 194 10*3/MM3 (ref 140–450)
POTASSIUM SERPL-SCNC: 4.3 MMOL/L (ref 3.5–5.2)
PROT SERPL-MCNC: 6.9 G/DL (ref 6–8.5)
PROTHROMBIN TIME: 17.5 SECONDS (ref 11.7–14.2)
RBC # BLD AUTO: 4.71 10*6/MM3 (ref 4.14–5.8)
SODIUM SERPL-SCNC: 141 MMOL/L (ref 136–145)
WBC # BLD AUTO: 7.08 10*3/MM3 (ref 3.4–10.8)

## 2021-10-27 PROCEDURE — 99232 SBSQ HOSP IP/OBS MODERATE 35: CPT | Performed by: PHYSICIAN ASSISTANT

## 2021-10-27 PROCEDURE — 85610 PROTHROMBIN TIME: CPT | Performed by: INTERNAL MEDICINE

## 2021-10-27 PROCEDURE — 99232 SBSQ HOSP IP/OBS MODERATE 35: CPT | Performed by: NURSE PRACTITIONER

## 2021-10-27 PROCEDURE — 82962 GLUCOSE BLOOD TEST: CPT

## 2021-10-27 PROCEDURE — 80053 COMPREHEN METABOLIC PANEL: CPT | Performed by: INTERNAL MEDICINE

## 2021-10-27 PROCEDURE — 85025 COMPLETE CBC W/AUTO DIFF WBC: CPT | Performed by: NURSE PRACTITIONER

## 2021-10-27 PROCEDURE — 74018 RADEX ABDOMEN 1 VIEW: CPT

## 2021-10-27 PROCEDURE — 97110 THERAPEUTIC EXERCISES: CPT

## 2021-10-27 PROCEDURE — 63710000001 INSULIN LISPRO (HUMAN) PER 5 UNITS: Performed by: HOSPITALIST

## 2021-10-27 PROCEDURE — 25010000002 HEPARIN (PORCINE) PER 1000 UNITS: Performed by: STUDENT IN AN ORGANIZED HEALTH CARE EDUCATION/TRAINING PROGRAM

## 2021-10-27 PROCEDURE — 85730 THROMBOPLASTIN TIME PARTIAL: CPT | Performed by: NURSE PRACTITIONER

## 2021-10-27 RX ORDER — HEPARIN SODIUM 10000 [USP'U]/100ML
12 INJECTION, SOLUTION INTRAVENOUS
Status: ACTIVE | OUTPATIENT
Start: 2021-10-27 | End: 2021-10-28

## 2021-10-27 RX ADMIN — POLYETHYLENE GLYCOL 3350, SODIUM SULFATE ANHYDROUS, SODIUM BICARBONATE, SODIUM CHLORIDE, POTASSIUM CHLORIDE 2000 ML: 236; 22.74; 6.74; 5.86; 2.97 POWDER, FOR SOLUTION ORAL at 05:31

## 2021-10-27 RX ADMIN — MAGNESIUM OXIDE 400 MG (241.3 MG MAGNESIUM) TABLET 400 MG: TABLET at 21:54

## 2021-10-27 RX ADMIN — DIGOXIN 125 MCG: 125 TABLET ORAL at 13:38

## 2021-10-27 RX ADMIN — FUROSEMIDE 40 MG: 40 TABLET ORAL at 13:37

## 2021-10-27 RX ADMIN — POLYETHYLENE GLYCOL 3350, SODIUM SULFATE ANHYDROUS, SODIUM BICARBONATE, SODIUM CHLORIDE, POTASSIUM CHLORIDE 2000 ML: 236; 22.74; 6.74; 5.86; 2.97 POWDER, FOR SOLUTION ORAL at 18:50

## 2021-10-27 RX ADMIN — METOPROLOL SUCCINATE 50 MG: 50 TABLET, EXTENDED RELEASE ORAL at 13:38

## 2021-10-27 RX ADMIN — SPIRONOLACTONE 25 MG: 25 TABLET ORAL at 13:38

## 2021-10-27 RX ADMIN — GUAIFENESIN 600 MG: 600 TABLET, EXTENDED RELEASE ORAL at 13:38

## 2021-10-27 RX ADMIN — HEPARIN SODIUM 12 UNITS/KG/HR: 10000 INJECTION, SOLUTION INTRAVENOUS at 16:21

## 2021-10-27 RX ADMIN — GUAIFENESIN 600 MG: 600 TABLET, EXTENDED RELEASE ORAL at 21:54

## 2021-10-27 RX ADMIN — INSULIN LISPRO 2 UNITS: 100 INJECTION, SOLUTION INTRAVENOUS; SUBCUTANEOUS at 21:54

## 2021-10-27 NOTE — TELEPHONE ENCOUNTER
Caller: JAMAAL    Relationship: Home Health    Best call back number: [phone]    What orders are you requesting (i.e. lab or imaging): NEEDING VERBAL ORDERS FOR NURSING AND PHYSICAL THERAPY.    In what timeframe would the patient need to come in: ASAP    Additional notes: PLEASE CALL.

## 2021-10-28 ENCOUNTER — ANESTHESIA (OUTPATIENT)
Dept: GASTROENTEROLOGY | Facility: HOSPITAL | Age: 86
End: 2021-10-28

## 2021-10-28 ENCOUNTER — ANESTHESIA EVENT (OUTPATIENT)
Dept: GASTROENTEROLOGY | Facility: HOSPITAL | Age: 86
End: 2021-10-28

## 2021-10-28 LAB
ANION GAP SERPL CALCULATED.3IONS-SCNC: 7.7 MMOL/L (ref 5–15)
APTT PPP: 36.8 SECONDS (ref 22.7–35.4)
APTT PPP: 71.6 SECONDS (ref 22.7–35.4)
BACTERIA FLD CULT: NORMAL
BASOPHILS # BLD AUTO: 0.06 10*3/MM3 (ref 0–0.2)
BASOPHILS NFR BLD AUTO: 1 % (ref 0–1.5)
BUN SERPL-MCNC: 30 MG/DL (ref 8–23)
BUN/CREAT SERPL: 21 (ref 7–25)
CALCIUM SPEC-SCNC: 8.5 MG/DL (ref 8.6–10.5)
CHLORIDE SERPL-SCNC: 102 MMOL/L (ref 98–107)
CO2 SERPL-SCNC: 26.3 MMOL/L (ref 22–29)
CREAT SERPL-MCNC: 1.43 MG/DL (ref 0.76–1.27)
DEPRECATED RDW RBC AUTO: 48 FL (ref 37–54)
EOSINOPHIL # BLD AUTO: 0.24 10*3/MM3 (ref 0–0.4)
EOSINOPHIL NFR BLD AUTO: 4.2 % (ref 0.3–6.2)
ERYTHROCYTE [DISTWIDTH] IN BLOOD BY AUTOMATED COUNT: 18.1 % (ref 12.3–15.4)
GFR SERPL CREATININE-BSD FRML MDRD: 47 ML/MIN/1.73
GLUCOSE BLDC GLUCOMTR-MCNC: 105 MG/DL (ref 70–130)
GLUCOSE BLDC GLUCOMTR-MCNC: 129 MG/DL (ref 70–130)
GLUCOSE BLDC GLUCOMTR-MCNC: 205 MG/DL (ref 70–130)
GLUCOSE BLDC GLUCOMTR-MCNC: 211 MG/DL (ref 70–130)
GLUCOSE SERPL-MCNC: 107 MG/DL (ref 65–99)
GRAM STN SPEC: NORMAL
HCT VFR BLD AUTO: 33.5 % (ref 37.5–51)
HGB BLD-MCNC: 9.8 G/DL (ref 13–17.7)
INR PPP: 1.3 (ref 0.9–1.1)
LYMPHOCYTES # BLD AUTO: 0.93 10*3/MM3 (ref 0.7–3.1)
LYMPHOCYTES NFR BLD AUTO: 16.1 % (ref 19.6–45.3)
MCH RBC QN AUTO: 21.6 PG (ref 26.6–33)
MCHC RBC AUTO-ENTMCNC: 29.3 G/DL (ref 31.5–35.7)
MCV RBC AUTO: 74 FL (ref 79–97)
MONOCYTES # BLD AUTO: 0.48 10*3/MM3 (ref 0.1–0.9)
MONOCYTES NFR BLD AUTO: 8.3 % (ref 5–12)
NEUTROPHILS NFR BLD AUTO: 4.06 10*3/MM3 (ref 1.7–7)
NEUTROPHILS NFR BLD AUTO: 70.2 % (ref 42.7–76)
PLATELET # BLD AUTO: 174 10*3/MM3 (ref 140–450)
POTASSIUM SERPL-SCNC: 4.3 MMOL/L (ref 3.5–5.2)
PROTHROMBIN TIME: 16 SECONDS (ref 11.7–14.2)
RBC # BLD AUTO: 4.53 10*6/MM3 (ref 4.14–5.8)
SODIUM SERPL-SCNC: 136 MMOL/L (ref 136–145)
WBC # BLD AUTO: 5.78 10*3/MM3 (ref 3.4–10.8)

## 2021-10-28 PROCEDURE — 63710000001 INSULIN LISPRO (HUMAN) PER 5 UNITS: Performed by: INTERNAL MEDICINE

## 2021-10-28 PROCEDURE — 25010000002 PROPOFOL 10 MG/ML EMULSION: Performed by: ANESTHESIOLOGY

## 2021-10-28 PROCEDURE — 0DB98ZX EXCISION OF DUODENUM, VIA NATURAL OR ARTIFICIAL OPENING ENDOSCOPIC, DIAGNOSTIC: ICD-10-PCS | Performed by: INTERNAL MEDICINE

## 2021-10-28 PROCEDURE — 0DBL8ZZ EXCISION OF TRANSVERSE COLON, VIA NATURAL OR ARTIFICIAL OPENING ENDOSCOPIC: ICD-10-PCS | Performed by: INTERNAL MEDICINE

## 2021-10-28 PROCEDURE — 97530 THERAPEUTIC ACTIVITIES: CPT

## 2021-10-28 PROCEDURE — 82962 GLUCOSE BLOOD TEST: CPT

## 2021-10-28 PROCEDURE — 0DB68ZX EXCISION OF STOMACH, VIA NATURAL OR ARTIFICIAL OPENING ENDOSCOPIC, DIAGNOSTIC: ICD-10-PCS | Performed by: INTERNAL MEDICINE

## 2021-10-28 PROCEDURE — 85730 THROMBOPLASTIN TIME PARTIAL: CPT | Performed by: INTERNAL MEDICINE

## 2021-10-28 PROCEDURE — 25010000002 HEPARIN (PORCINE) PER 1000 UNITS: Performed by: INTERNAL MEDICINE

## 2021-10-28 PROCEDURE — 85025 COMPLETE CBC W/AUTO DIFF WBC: CPT | Performed by: NURSE PRACTITIONER

## 2021-10-28 PROCEDURE — 97116 GAIT TRAINING THERAPY: CPT

## 2021-10-28 PROCEDURE — 25010000002 PHENYLEPHRINE 10 MG/ML SOLUTION: Performed by: ANESTHESIOLOGY

## 2021-10-28 PROCEDURE — 0DBK8ZZ EXCISION OF ASCENDING COLON, VIA NATURAL OR ARTIFICIAL OPENING ENDOSCOPIC: ICD-10-PCS | Performed by: INTERNAL MEDICINE

## 2021-10-28 PROCEDURE — 88305 TISSUE EXAM BY PATHOLOGIST: CPT | Performed by: INTERNAL MEDICINE

## 2021-10-28 PROCEDURE — 25010000002 FUROSEMIDE PER 20 MG: Performed by: NURSE PRACTITIONER

## 2021-10-28 PROCEDURE — 43239 EGD BIOPSY SINGLE/MULTIPLE: CPT | Performed by: INTERNAL MEDICINE

## 2021-10-28 PROCEDURE — 45385 COLONOSCOPY W/LESION REMOVAL: CPT | Performed by: INTERNAL MEDICINE

## 2021-10-28 PROCEDURE — 85610 PROTHROMBIN TIME: CPT | Performed by: INTERNAL MEDICINE

## 2021-10-28 PROCEDURE — 85730 THROMBOPLASTIN TIME PARTIAL: CPT | Performed by: STUDENT IN AN ORGANIZED HEALTH CARE EDUCATION/TRAINING PROGRAM

## 2021-10-28 PROCEDURE — 99232 SBSQ HOSP IP/OBS MODERATE 35: CPT | Performed by: NURSE PRACTITIONER

## 2021-10-28 PROCEDURE — 80048 BASIC METABOLIC PNL TOTAL CA: CPT | Performed by: INTERNAL MEDICINE

## 2021-10-28 RX ORDER — GLYCOPYRROLATE 0.2 MG/ML
INJECTION INTRAMUSCULAR; INTRAVENOUS AS NEEDED
Status: DISCONTINUED | OUTPATIENT
Start: 2021-10-28 | End: 2021-10-28 | Stop reason: SURG

## 2021-10-28 RX ORDER — HEPARIN SODIUM 10000 [USP'U]/100ML
11.7 INJECTION, SOLUTION INTRAVENOUS
Status: ACTIVE | OUTPATIENT
Start: 2021-10-29 | End: 2021-10-29

## 2021-10-28 RX ORDER — SODIUM CHLORIDE, SODIUM LACTATE, POTASSIUM CHLORIDE, CALCIUM CHLORIDE 600; 310; 30; 20 MG/100ML; MG/100ML; MG/100ML; MG/100ML
INJECTION, SOLUTION INTRAVENOUS CONTINUOUS PRN
Status: DISCONTINUED | OUTPATIENT
Start: 2021-10-28 | End: 2021-10-28 | Stop reason: SURG

## 2021-10-28 RX ORDER — PROPOFOL 10 MG/ML
VIAL (ML) INTRAVENOUS AS NEEDED
Status: DISCONTINUED | OUTPATIENT
Start: 2021-10-28 | End: 2021-10-28 | Stop reason: SURG

## 2021-10-28 RX ORDER — PHENYLEPHRINE HYDROCHLORIDE 10 MG/ML
INJECTION INTRAVENOUS AS NEEDED
Status: DISCONTINUED | OUTPATIENT
Start: 2021-10-28 | End: 2021-10-28 | Stop reason: SURG

## 2021-10-28 RX ORDER — PANTOPRAZOLE SODIUM 40 MG/1
40 TABLET, DELAYED RELEASE ORAL
Status: DISCONTINUED | OUTPATIENT
Start: 2021-10-29 | End: 2021-10-30 | Stop reason: HOSPADM

## 2021-10-28 RX ORDER — PANTOPRAZOLE SODIUM 40 MG/10ML
40 INJECTION, POWDER, LYOPHILIZED, FOR SOLUTION INTRAVENOUS
Status: DISCONTINUED | OUTPATIENT
Start: 2021-10-28 | End: 2021-10-28

## 2021-10-28 RX ORDER — PROPOFOL 10 MG/ML
VIAL (ML) INTRAVENOUS CONTINUOUS PRN
Status: DISCONTINUED | OUTPATIENT
Start: 2021-10-28 | End: 2021-10-28 | Stop reason: SURG

## 2021-10-28 RX ORDER — SODIUM CHLORIDE 9 MG/ML
1000 INJECTION, SOLUTION INTRAVENOUS CONTINUOUS
Status: CANCELLED | OUTPATIENT
Start: 2021-10-28

## 2021-10-28 RX ORDER — HEPARIN SODIUM 10000 [USP'U]/100ML
11.7 INJECTION, SOLUTION INTRAVENOUS
Status: DISPENSED | OUTPATIENT
Start: 2021-10-28 | End: 2021-10-28

## 2021-10-28 RX ORDER — FUROSEMIDE 10 MG/ML
40 INJECTION INTRAMUSCULAR; INTRAVENOUS ONCE
Status: COMPLETED | OUTPATIENT
Start: 2021-10-28 | End: 2021-10-28

## 2021-10-28 RX ORDER — SODIUM CHLORIDE 0.9 % (FLUSH) 0.9 %
10 SYRINGE (ML) INJECTION AS NEEDED
Status: CANCELLED | OUTPATIENT
Start: 2021-10-28

## 2021-10-28 RX ORDER — LIDOCAINE HYDROCHLORIDE 20 MG/ML
INJECTION, SOLUTION INFILTRATION; PERINEURAL AS NEEDED
Status: DISCONTINUED | OUTPATIENT
Start: 2021-10-28 | End: 2021-10-28 | Stop reason: SURG

## 2021-10-28 RX ADMIN — MAGNESIUM OXIDE 400 MG (241.3 MG MAGNESIUM) TABLET 400 MG: TABLET at 20:21

## 2021-10-28 RX ADMIN — PROPOFOL 20 MG: 10 INJECTION, EMULSION INTRAVENOUS at 10:19

## 2021-10-28 RX ADMIN — PHENYLEPHRINE HYDROCHLORIDE 100 MCG: 10 INJECTION INTRAVENOUS at 10:20

## 2021-10-28 RX ADMIN — SODIUM CHLORIDE, PRESERVATIVE FREE 10 ML: 5 INJECTION INTRAVENOUS at 20:22

## 2021-10-28 RX ADMIN — POLYETHYLENE GLYCOL 3350, SODIUM SULFATE ANHYDROUS, SODIUM BICARBONATE, SODIUM CHLORIDE, POTASSIUM CHLORIDE 2000 ML: 236; 22.74; 6.74; 5.86; 2.97 POWDER, FOR SOLUTION ORAL at 05:11

## 2021-10-28 RX ADMIN — MAGNESIUM OXIDE 400 MG (241.3 MG MAGNESIUM) TABLET 400 MG: TABLET at 12:26

## 2021-10-28 RX ADMIN — GUAIFENESIN 600 MG: 600 TABLET, EXTENDED RELEASE ORAL at 22:41

## 2021-10-28 RX ADMIN — SPIRONOLACTONE 25 MG: 25 TABLET ORAL at 12:27

## 2021-10-28 RX ADMIN — SODIUM CHLORIDE, POTASSIUM CHLORIDE, SODIUM LACTATE AND CALCIUM CHLORIDE: 600; 310; 30; 20 INJECTION, SOLUTION INTRAVENOUS at 09:55

## 2021-10-28 RX ADMIN — HEPARIN SODIUM 11.7 UNITS/KG/HR: 10000 INJECTION, SOLUTION INTRAVENOUS at 13:59

## 2021-10-28 RX ADMIN — PHENYLEPHRINE HYDROCHLORIDE 150 MCG: 10 INJECTION INTRAVENOUS at 10:26

## 2021-10-28 RX ADMIN — FUROSEMIDE 40 MG: 40 TABLET ORAL at 12:27

## 2021-10-28 RX ADMIN — PHENYLEPHRINE HYDROCHLORIDE 100 MCG: 10 INJECTION INTRAVENOUS at 10:39

## 2021-10-28 RX ADMIN — LIDOCAINE HYDROCHLORIDE 80 MG: 20 INJECTION, SOLUTION INFILTRATION; PERINEURAL at 10:02

## 2021-10-28 RX ADMIN — PHENYLEPHRINE HYDROCHLORIDE 100 MCG: 10 INJECTION INTRAVENOUS at 10:41

## 2021-10-28 RX ADMIN — SODIUM CHLORIDE, PRESERVATIVE FREE 10 ML: 5 INJECTION INTRAVENOUS at 13:59

## 2021-10-28 RX ADMIN — PROPOFOL 120 MG: 10 INJECTION, EMULSION INTRAVENOUS at 10:02

## 2021-10-28 RX ADMIN — GUAIFENESIN 600 MG: 600 TABLET, EXTENDED RELEASE ORAL at 12:26

## 2021-10-28 RX ADMIN — FUROSEMIDE 40 MG: 10 INJECTION, SOLUTION INTRAMUSCULAR; INTRAVENOUS at 17:30

## 2021-10-28 RX ADMIN — HEPARIN SODIUM 11.7 UNITS/KG/HR: 10000 INJECTION, SOLUTION INTRAVENOUS at 19:35

## 2021-10-28 RX ADMIN — ASPIRIN 81 MG: 81 TABLET, COATED ORAL at 12:26

## 2021-10-28 RX ADMIN — GLYCOPYRROLATE 0.2 MG: 0.2 INJECTION INTRAMUSCULAR; INTRAVENOUS at 09:57

## 2021-10-28 RX ADMIN — INSULIN LISPRO 3 UNITS: 100 INJECTION, SOLUTION INTRAVENOUS; SUBCUTANEOUS at 17:30

## 2021-10-28 RX ADMIN — HEPARIN SODIUM 11.7 UNITS/KG/HR: 10000 INJECTION, SOLUTION INTRAVENOUS at 22:33

## 2021-10-28 RX ADMIN — METOPROLOL SUCCINATE 50 MG: 50 TABLET, EXTENDED RELEASE ORAL at 12:26

## 2021-10-28 RX ADMIN — PHENYLEPHRINE HYDROCHLORIDE 150 MCG: 10 INJECTION INTRAVENOUS at 10:10

## 2021-10-28 RX ADMIN — ATORVASTATIN CALCIUM 40 MG: 20 TABLET, FILM COATED ORAL at 12:26

## 2021-10-28 RX ADMIN — DIGOXIN 125 MCG: 125 TABLET ORAL at 12:26

## 2021-10-28 RX ADMIN — PHENYLEPHRINE HYDROCHLORIDE 200 MCG: 10 INJECTION INTRAVENOUS at 10:37

## 2021-10-28 RX ADMIN — Medication 200 MCG/KG/MIN: at 10:02

## 2021-10-28 RX ADMIN — INSULIN LISPRO 3 UNITS: 100 INJECTION, SOLUTION INTRAVENOUS; SUBCUTANEOUS at 22:41

## 2021-10-28 NOTE — ANESTHESIA PREPROCEDURE EVALUATION
Anesthesia Evaluation     Patient summary reviewed   no history of anesthetic complications:  NPO Solid Status: > 8 hours  NPO Liquid Status: > 2 hours           Airway   Mallampati: II  TM distance: >3 FB  Neck ROM: full  No difficulty expected  Dental      Pulmonary     breath sounds clear to auscultation  (+) pleural effusion (1650ml drained 10/24/21), a smoker Former,   (-) shortness of breath, recent URI  Cardiovascular     ECG reviewed  PT is on anticoagulation therapy  Rhythm: irregular  Rate: normal    (+) hypertension, valvular problems/murmurs (prvs AVR) TI, dysrhythmias Atrial Fib, CHF , hyperlipidemia,   (-) past MI, angina    ROS comment: Interpretation Summary    · Estimated left ventricular EF = 60% Left ventricular systolic function is normal.  · Left ventricular diastolic function was indeterminate.  · The right ventricular cavity is mild to moderately dilated.  · Left atrial volume is moderately increased.  · The right atrial cavity is moderately dilated.  · There is a bioprosthetic aortic valve present. The aortic valve peak and mean gradients are within defined limits.  · Moderate tricuspid valve regurgitation is present.  · Severe pulmonary hypertension is present. Calculated right ventricular systolic pressure from tricuspid regurgitation is 63.7 mmHg.        Neuro/Psych  (+) TIA (5d ago), CVA, weakness (LE),     (-) seizures  GI/Hepatic/Renal/Endo    (+)  GERD,  renal disease (improving crt 1.6) ARF and CRI, diabetes mellitus poorly controlled,   (-)  obesity    Musculoskeletal     (-) neck stiffness  Abdominal    Substance History      OB/GYN          Other   blood dyscrasia (hgb 10.0, INR 1.44) anemia,   history of cancer (kidney carcinoma)                  Anesthesia Plan    ASA 4     MAC       Anesthetic plan, all risks, benefits, and alternatives have been provided, discussed and informed consent has been obtained with: patient.  Use of blood products discussed with patient .

## 2021-10-28 NOTE — ANESTHESIA POSTPROCEDURE EVALUATION
"Patient: Francisco Sequeira    Procedure Summary     Date: 10/28/21 Room / Location: [facility] /  [facility]    Anesthesia Start: 0955 Anesthesia Stop: 1043    Procedures:       ESOPHAGOGASTRODUODENOSCOPY with biopsies (N/A Esophagus)      COLONOSCOPY to cecum:  cold snare polyps, (N/A ) Diagnosis:       Iron deficiency anemia due to chronic blood loss      (Iron deficiency anemia due to chronic blood loss [D50.0])    Surgeons: Dinesh Meza MD Provider: Sudarshan Leonard DO    Anesthesia Type: MAC ASA Status: 4          Anesthesia Type: MAC    Vitals  Vitals Value Taken Time   /60 10/28/21 1045   Temp     Pulse 68 10/28/21 1045   Resp 17 10/28/21 1045   SpO2 100 % 10/28/21 1045           Post Anesthesia Care and Evaluation    Patient location during evaluation: bedside  Patient participation: waiting for patient participation  Level of consciousness: sleepy but conscious and responsive to physical stimuli  Pain management: adequate  Airway patency: patent  Anesthetic complications: No anesthetic complications  PONV Status: NA  Cardiovascular status: acceptable and hemodynamically stable  Respiratory status: acceptable, spontaneous ventilation and nonlabored ventilation  Hydration status: acceptable    Comments: /60 (BP Location: Right arm, Patient Position: Lying)   Pulse 68   Temp 36.6 °C (97.9 °F) (Oral)   Resp 17   Ht 182.9 cm (72\")   Wt 85.3 kg (188 lb)   SpO2 100%   BMI 25.50 kg/m²       "

## 2021-10-29 LAB
ANION GAP SERPL CALCULATED.3IONS-SCNC: 6.6 MMOL/L (ref 5–15)
ANISOCYTOSIS BLD QL: ABNORMAL
APTT PPP: 115.6 SECONDS (ref 22.7–35.4)
BASOPHILS # BLD MANUAL: 0.06 10*3/MM3 (ref 0–0.2)
BASOPHILS NFR BLD AUTO: 1.1 % (ref 0–1.5)
BUN SERPL-MCNC: 32 MG/DL (ref 8–23)
BUN/CREAT SERPL: 21.3 (ref 7–25)
CALCIUM SPEC-SCNC: 8.4 MG/DL (ref 8.6–10.5)
CHLORIDE SERPL-SCNC: 100 MMOL/L (ref 98–107)
CO2 SERPL-SCNC: 27.4 MMOL/L (ref 22–29)
CREAT SERPL-MCNC: 1.5 MG/DL (ref 0.76–1.27)
DEPRECATED RDW RBC AUTO: 44.6 FL (ref 37–54)
ERYTHROCYTE [DISTWIDTH] IN BLOOD BY AUTOMATED COUNT: 17.9 % (ref 12.3–15.4)
GFR SERPL CREATININE-BSD FRML MDRD: 45 ML/MIN/1.73
GLUCOSE BLDC GLUCOMTR-MCNC: 161 MG/DL (ref 70–130)
GLUCOSE BLDC GLUCOMTR-MCNC: 182 MG/DL (ref 70–130)
GLUCOSE BLDC GLUCOMTR-MCNC: 226 MG/DL (ref 70–130)
GLUCOSE BLDC GLUCOMTR-MCNC: 234 MG/DL (ref 70–130)
GLUCOSE SERPL-MCNC: 146 MG/DL (ref 65–99)
HCT VFR BLD AUTO: 29.5 % (ref 37.5–51)
HGB BLD-MCNC: 8.9 G/DL (ref 13–17.7)
HYPOCHROMIA BLD QL: ABNORMAL
INR PPP: 1.27 (ref 0.9–1.1)
LAB AP CASE REPORT: NORMAL
LYMPHOCYTES # BLD MANUAL: 0.51 10*3/MM3 (ref 0.7–3.1)
LYMPHOCYTES NFR BLD MANUAL: 10 % (ref 19.6–45.3)
LYMPHOCYTES NFR BLD MANUAL: 5.6 % (ref 5–12)
MCH RBC QN AUTO: 21.4 PG (ref 26.6–33)
MCHC RBC AUTO-ENTMCNC: 30.2 G/DL (ref 31.5–35.7)
MCV RBC AUTO: 71.1 FL (ref 79–97)
MONOCYTES # BLD AUTO: 0.29 10*3/MM3 (ref 0.1–0.9)
NEUTROPHILS # BLD AUTO: 4.26 10*3/MM3 (ref 1.7–7)
NEUTROPHILS NFR BLD MANUAL: 83.3 % (ref 42.7–76)
NRBC SPEC MANUAL: 3.3 /100 WBC (ref 0–0.2)
OVALOCYTES BLD QL SMEAR: ABNORMAL
PATH REPORT.FINAL DX SPEC: NORMAL
PATH REPORT.GROSS SPEC: NORMAL
PLAT MORPH BLD: NORMAL
PLATELET # BLD AUTO: 167 10*3/MM3 (ref 140–450)
POIKILOCYTOSIS BLD QL SMEAR: ABNORMAL
POTASSIUM SERPL-SCNC: 4 MMOL/L (ref 3.5–5.2)
PROTHROMBIN TIME: 15.6 SECONDS (ref 11.7–14.2)
RBC # BLD AUTO: 4.15 10*6/MM3 (ref 4.14–5.8)
SODIUM SERPL-SCNC: 134 MMOL/L (ref 136–145)
WBC # BLD AUTO: 5.12 10*3/MM3 (ref 3.4–10.8)
WBC MORPH BLD: NORMAL

## 2021-10-29 PROCEDURE — 85730 THROMBOPLASTIN TIME PARTIAL: CPT | Performed by: INTERNAL MEDICINE

## 2021-10-29 PROCEDURE — 63710000001 INSULIN LISPRO (HUMAN) PER 5 UNITS: Performed by: INTERNAL MEDICINE

## 2021-10-29 PROCEDURE — 85007 BL SMEAR W/DIFF WBC COUNT: CPT | Performed by: INTERNAL MEDICINE

## 2021-10-29 PROCEDURE — 25010000002 HEPARIN (PORCINE) PER 1000 UNITS: Performed by: NURSE PRACTITIONER

## 2021-10-29 PROCEDURE — 85610 PROTHROMBIN TIME: CPT | Performed by: INTERNAL MEDICINE

## 2021-10-29 PROCEDURE — 99232 SBSQ HOSP IP/OBS MODERATE 35: CPT | Performed by: INTERNAL MEDICINE

## 2021-10-29 PROCEDURE — 80048 BASIC METABOLIC PNL TOTAL CA: CPT | Performed by: STUDENT IN AN ORGANIZED HEALTH CARE EDUCATION/TRAINING PROGRAM

## 2021-10-29 PROCEDURE — 82962 GLUCOSE BLOOD TEST: CPT

## 2021-10-29 PROCEDURE — 85025 COMPLETE CBC W/AUTO DIFF WBC: CPT | Performed by: INTERNAL MEDICINE

## 2021-10-29 PROCEDURE — 25010000002 ENOXAPARIN PER 10 MG: Performed by: STUDENT IN AN ORGANIZED HEALTH CARE EDUCATION/TRAINING PROGRAM

## 2021-10-29 PROCEDURE — 99232 SBSQ HOSP IP/OBS MODERATE 35: CPT | Performed by: NURSE PRACTITIONER

## 2021-10-29 RX ORDER — HEPARIN SODIUM 5000 [USP'U]/ML
30-46.9 INJECTION, SOLUTION INTRAVENOUS; SUBCUTANEOUS EVERY 6 HOURS PRN
Status: DISCONTINUED | OUTPATIENT
Start: 2021-10-29 | End: 2021-10-30

## 2021-10-29 RX ORDER — WARFARIN SODIUM 7.5 MG/1
7.5 TABLET ORAL
Status: COMPLETED | OUTPATIENT
Start: 2021-10-29 | End: 2021-10-29

## 2021-10-29 RX ORDER — HEPARIN SODIUM 10000 [USP'U]/100ML
11.7 INJECTION, SOLUTION INTRAVENOUS
Status: ACTIVE | OUTPATIENT
Start: 2021-10-29 | End: 2021-10-29

## 2021-10-29 RX ORDER — DIGOXIN 125 MCG
125 TABLET ORAL
Status: DISCONTINUED | OUTPATIENT
Start: 2021-10-30 | End: 2021-10-30 | Stop reason: HOSPADM

## 2021-10-29 RX ORDER — HEPARIN SODIUM 10000 [USP'U]/100ML
11.7 INJECTION, SOLUTION INTRAVENOUS
Status: DISCONTINUED | OUTPATIENT
Start: 2021-10-29 | End: 2021-10-29

## 2021-10-29 RX ADMIN — INSULIN LISPRO 3 UNITS: 100 INJECTION, SOLUTION INTRAVENOUS; SUBCUTANEOUS at 12:35

## 2021-10-29 RX ADMIN — INSULIN LISPRO 2 UNITS: 100 INJECTION, SOLUTION INTRAVENOUS; SUBCUTANEOUS at 17:16

## 2021-10-29 RX ADMIN — WARFARIN 7.5 MG: 7.5 TABLET ORAL at 17:16

## 2021-10-29 RX ADMIN — SPIRONOLACTONE 25 MG: 25 TABLET ORAL at 09:27

## 2021-10-29 RX ADMIN — GUAIFENESIN 600 MG: 600 TABLET, EXTENDED RELEASE ORAL at 22:00

## 2021-10-29 RX ADMIN — MAGNESIUM OXIDE 400 MG (241.3 MG MAGNESIUM) TABLET 400 MG: TABLET at 20:24

## 2021-10-29 RX ADMIN — INSULIN LISPRO 2 UNITS: 100 INJECTION, SOLUTION INTRAVENOUS; SUBCUTANEOUS at 09:28

## 2021-10-29 RX ADMIN — METOPROLOL SUCCINATE 50 MG: 50 TABLET, EXTENDED RELEASE ORAL at 09:28

## 2021-10-29 RX ADMIN — HEPARIN SODIUM 11.7 UNITS/KG/HR: 10000 INJECTION, SOLUTION INTRAVENOUS at 12:35

## 2021-10-29 RX ADMIN — MAGNESIUM OXIDE 400 MG (241.3 MG MAGNESIUM) TABLET 400 MG: TABLET at 09:27

## 2021-10-29 RX ADMIN — PANTOPRAZOLE SODIUM 40 MG: 40 TABLET, DELAYED RELEASE ORAL at 06:25

## 2021-10-29 RX ADMIN — SODIUM CHLORIDE, PRESERVATIVE FREE 10 ML: 5 INJECTION INTRAVENOUS at 09:29

## 2021-10-29 RX ADMIN — ASPIRIN 81 MG: 81 TABLET, COATED ORAL at 09:27

## 2021-10-29 RX ADMIN — GUAIFENESIN 600 MG: 600 TABLET, EXTENDED RELEASE ORAL at 09:28

## 2021-10-29 RX ADMIN — ENOXAPARIN SODIUM 90 MG: 100 INJECTION, SOLUTION INTRAVENOUS; SUBCUTANEOUS at 20:24

## 2021-10-29 RX ADMIN — DIGOXIN 125 MCG: 125 TABLET ORAL at 09:27

## 2021-10-29 RX ADMIN — ATORVASTATIN CALCIUM 40 MG: 20 TABLET, FILM COATED ORAL at 09:27

## 2021-10-29 RX ADMIN — FUROSEMIDE 40 MG: 40 TABLET ORAL at 09:27

## 2021-10-29 RX ADMIN — SODIUM CHLORIDE, PRESERVATIVE FREE 10 ML: 5 INJECTION INTRAVENOUS at 20:25

## 2021-10-29 RX ADMIN — HEPARIN SODIUM 11.7 UNITS/KG/HR: 10000 INJECTION, SOLUTION INTRAVENOUS at 00:22

## 2021-10-29 RX ADMIN — INSULIN LISPRO 3 UNITS: 100 INJECTION, SOLUTION INTRAVENOUS; SUBCUTANEOUS at 22:00

## 2021-10-30 ENCOUNTER — READMISSION MANAGEMENT (OUTPATIENT)
Dept: CALL CENTER | Facility: HOSPITAL | Age: 86
End: 2021-10-30

## 2021-10-30 VITALS
HEIGHT: 72 IN | TEMPERATURE: 98.4 F | DIASTOLIC BLOOD PRESSURE: 62 MMHG | OXYGEN SATURATION: 96 % | WEIGHT: 188 LBS | BODY MASS INDEX: 25.47 KG/M2 | RESPIRATION RATE: 16 BRPM | SYSTOLIC BLOOD PRESSURE: 132 MMHG | HEART RATE: 55 BPM

## 2021-10-30 LAB
ANION GAP SERPL CALCULATED.3IONS-SCNC: 10.6 MMOL/L (ref 5–15)
BASOPHILS # BLD MANUAL: 0.05 10*3/MM3 (ref 0–0.2)
BASOPHILS NFR BLD AUTO: 1 % (ref 0–1.5)
BUN SERPL-MCNC: 31 MG/DL (ref 8–23)
BUN/CREAT SERPL: 21.2 (ref 7–25)
CALCIUM SPEC-SCNC: 8.1 MG/DL (ref 8.6–10.5)
CHLORIDE SERPL-SCNC: 103 MMOL/L (ref 98–107)
CO2 SERPL-SCNC: 24.4 MMOL/L (ref 22–29)
CREAT SERPL-MCNC: 1.46 MG/DL (ref 0.76–1.27)
DEPRECATED RDW RBC AUTO: 48 FL (ref 37–54)
DEPRECATED RDW RBC AUTO: 48.5 FL (ref 37–54)
ELLIPTOCYTES BLD QL SMEAR: ABNORMAL
EOSINOPHIL # BLD MANUAL: 0.41 10*3/MM3 (ref 0–0.4)
EOSINOPHIL NFR BLD MANUAL: 8 % (ref 0.3–6.2)
ERYTHROCYTE [DISTWIDTH] IN BLOOD BY AUTOMATED COUNT: 18 % (ref 12.3–15.4)
ERYTHROCYTE [DISTWIDTH] IN BLOOD BY AUTOMATED COUNT: 18.1 % (ref 12.3–15.4)
FERRITIN SERPL-MCNC: 40.5 NG/ML (ref 30–400)
GFR SERPL CREATININE-BSD FRML MDRD: 46 ML/MIN/1.73
GIANT PLATELETS: ABNORMAL
GLUCOSE BLDC GLUCOMTR-MCNC: 162 MG/DL (ref 70–130)
GLUCOSE BLDC GLUCOMTR-MCNC: 183 MG/DL (ref 70–130)
GLUCOSE BLDC GLUCOMTR-MCNC: 204 MG/DL (ref 70–130)
GLUCOSE SERPL-MCNC: 175 MG/DL (ref 65–99)
HCT VFR BLD AUTO: 31.8 % (ref 37.5–51)
HCT VFR BLD AUTO: 32.5 % (ref 37.5–51)
HGB BLD-MCNC: 9.1 G/DL (ref 13–17.7)
HGB BLD-MCNC: 9.3 G/DL (ref 13–17.7)
INR PPP: 1.27 (ref 0.9–1.1)
LYMPHOCYTES # BLD MANUAL: 0.83 10*3/MM3 (ref 0.7–3.1)
LYMPHOCYTES NFR BLD MANUAL: 16 % (ref 19.6–45.3)
LYMPHOCYTES NFR BLD MANUAL: 6 % (ref 5–12)
MCH RBC QN AUTO: 21.2 PG (ref 26.6–33)
MCH RBC QN AUTO: 21.3 PG (ref 26.6–33)
MCHC RBC AUTO-ENTMCNC: 28.6 G/DL (ref 31.5–35.7)
MCHC RBC AUTO-ENTMCNC: 28.6 G/DL (ref 31.5–35.7)
MCV RBC AUTO: 74.1 FL (ref 79–97)
MCV RBC AUTO: 74.5 FL (ref 79–97)
MONOCYTES # BLD AUTO: 0.31 10*3/MM3 (ref 0.1–0.9)
NEUTROPHILS # BLD AUTO: 3.57 10*3/MM3 (ref 1.7–7)
NEUTROPHILS NFR BLD MANUAL: 69 % (ref 42.7–76)
OVALOCYTES BLD QL SMEAR: ABNORMAL
PLATELET # BLD AUTO: 161 10*3/MM3 (ref 140–450)
PLATELET # BLD AUTO: 162 10*3/MM3 (ref 140–450)
POIKILOCYTOSIS BLD QL SMEAR: ABNORMAL
POLYCHROMASIA BLD QL SMEAR: ABNORMAL
POTASSIUM SERPL-SCNC: 4.4 MMOL/L (ref 3.5–5.2)
PROTHROMBIN TIME: 15.6 SECONDS (ref 11.7–14.2)
RBC # BLD AUTO: 4.29 10*6/MM3 (ref 4.14–5.8)
RBC # BLD AUTO: 4.36 10*6/MM3 (ref 4.14–5.8)
SMUDGE CELLS BLD QL SMEAR: ABNORMAL
SODIUM SERPL-SCNC: 138 MMOL/L (ref 136–145)
TARGETS BLD QL SMEAR: ABNORMAL
WBC # BLD AUTO: 4.85 10*3/MM3 (ref 3.4–10.8)
WBC # BLD AUTO: 5.17 10*3/MM3 (ref 3.4–10.8)

## 2021-10-30 PROCEDURE — 85007 BL SMEAR W/DIFF WBC COUNT: CPT | Performed by: STUDENT IN AN ORGANIZED HEALTH CARE EDUCATION/TRAINING PROGRAM

## 2021-10-30 PROCEDURE — 80048 BASIC METABOLIC PNL TOTAL CA: CPT | Performed by: STUDENT IN AN ORGANIZED HEALTH CARE EDUCATION/TRAINING PROGRAM

## 2021-10-30 PROCEDURE — 99232 SBSQ HOSP IP/OBS MODERATE 35: CPT | Performed by: INTERNAL MEDICINE

## 2021-10-30 PROCEDURE — 25010000002 ENOXAPARIN PER 10 MG: Performed by: STUDENT IN AN ORGANIZED HEALTH CARE EDUCATION/TRAINING PROGRAM

## 2021-10-30 PROCEDURE — 63710000001 INSULIN LISPRO (HUMAN) PER 5 UNITS: Performed by: INTERNAL MEDICINE

## 2021-10-30 PROCEDURE — 85025 COMPLETE CBC W/AUTO DIFF WBC: CPT | Performed by: STUDENT IN AN ORGANIZED HEALTH CARE EDUCATION/TRAINING PROGRAM

## 2021-10-30 PROCEDURE — 85027 COMPLETE CBC AUTOMATED: CPT | Performed by: STUDENT IN AN ORGANIZED HEALTH CARE EDUCATION/TRAINING PROGRAM

## 2021-10-30 PROCEDURE — 82962 GLUCOSE BLOOD TEST: CPT

## 2021-10-30 PROCEDURE — 82728 ASSAY OF FERRITIN: CPT | Performed by: STUDENT IN AN ORGANIZED HEALTH CARE EDUCATION/TRAINING PROGRAM

## 2021-10-30 PROCEDURE — 85610 PROTHROMBIN TIME: CPT | Performed by: INTERNAL MEDICINE

## 2021-10-30 RX ORDER — PANTOPRAZOLE SODIUM 40 MG/1
40 TABLET, DELAYED RELEASE ORAL DAILY
Qty: 30 TABLET | Refills: 0 | Status: ON HOLD | OUTPATIENT
Start: 2021-10-30 | End: 2021-11-04

## 2021-10-30 RX ORDER — DOXYCYCLINE HYCLATE 50 MG/1
324 CAPSULE, GELATIN COATED ORAL EVERY OTHER DAY
Qty: 15 TABLET | Refills: 0 | Status: ON HOLD | OUTPATIENT
Start: 2021-10-30 | End: 2021-11-04

## 2021-10-30 RX ORDER — WARFARIN SODIUM 7.5 MG/1
7.5 TABLET ORAL
Status: DISCONTINUED | OUTPATIENT
Start: 2021-10-30 | End: 2021-10-30 | Stop reason: HOSPADM

## 2021-10-30 RX ORDER — FUROSEMIDE 40 MG/1
40 TABLET ORAL DAILY
Qty: 30 TABLET | Refills: 0 | Status: ON HOLD | OUTPATIENT
Start: 2021-10-31 | End: 2021-11-04

## 2021-10-30 RX ADMIN — ASPIRIN 81 MG: 81 TABLET, COATED ORAL at 08:42

## 2021-10-30 RX ADMIN — INSULIN LISPRO 2 UNITS: 100 INJECTION, SOLUTION INTRAVENOUS; SUBCUTANEOUS at 08:55

## 2021-10-30 RX ADMIN — GUAIFENESIN 600 MG: 600 TABLET, EXTENDED RELEASE ORAL at 10:55

## 2021-10-30 RX ADMIN — SPIRONOLACTONE 25 MG: 25 TABLET ORAL at 08:42

## 2021-10-30 RX ADMIN — MAGNESIUM OXIDE 400 MG (241.3 MG MAGNESIUM) TABLET 400 MG: TABLET at 08:43

## 2021-10-30 RX ADMIN — PANTOPRAZOLE SODIUM 40 MG: 40 TABLET, DELAYED RELEASE ORAL at 08:55

## 2021-10-30 RX ADMIN — INSULIN LISPRO 3 UNITS: 100 INJECTION, SOLUTION INTRAVENOUS; SUBCUTANEOUS at 11:59

## 2021-10-30 RX ADMIN — ATORVASTATIN CALCIUM 40 MG: 20 TABLET, FILM COATED ORAL at 08:42

## 2021-10-30 RX ADMIN — ENOXAPARIN SODIUM 90 MG: 100 INJECTION, SOLUTION INTRAVENOUS; SUBCUTANEOUS at 08:44

## 2021-10-30 RX ADMIN — FUROSEMIDE 40 MG: 40 TABLET ORAL at 08:43

## 2021-10-30 RX ADMIN — SODIUM CHLORIDE, PRESERVATIVE FREE 10 ML: 5 INJECTION INTRAVENOUS at 08:45

## 2021-10-30 RX ADMIN — METOPROLOL SUCCINATE 50 MG: 50 TABLET, EXTENDED RELEASE ORAL at 08:43

## 2021-10-30 RX ADMIN — DIGOXIN 125 MCG: 125 TABLET ORAL at 17:00

## 2021-10-30 NOTE — OUTREACH NOTE
Prep Survey      Responses   [facility] patient discharged from? [place]   Is LACE score < 7 ? No   Emergency Room discharge w/ pulse ox? No   Eligibility [facility]   Date of Admission 10/23/21   Date of Discharge 10/30/21   Discharge Disposition Home or Self Care   Discharge diagnosis  Acute on chronic heart failure    Does the patient have one of the following disease processes/diagnoses(primary or secondary)? CHF   Does the patient have Home health ordered? Yes   What is the Home health agency?   [facility]    Is there a DME ordered? Yes   What DME was ordered? rolling walker from [employer]   Prep survey completed? Yes          Radha Varma RN

## 2021-10-31 ENCOUNTER — TRANSCRIBE ORDERS (OUTPATIENT)
Dept: HOME HEALTH SERVICES | Facility: HOME HEALTHCARE | Age: 86
End: 2021-10-31

## 2021-10-31 ENCOUNTER — HOME HEALTH ADMISSION (OUTPATIENT)
Dept: HOME HEALTH SERVICES | Facility: HOME HEALTHCARE | Age: 86
End: 2021-10-31

## 2021-10-31 DIAGNOSIS — K76.9 PLEURAL EFFUSION ASSOCIATED WITH HEPATIC DISORDER: Primary | ICD-10-CM

## 2021-10-31 DIAGNOSIS — J91.8 PLEURAL EFFUSION ASSOCIATED WITH HEPATIC DISORDER: Primary | ICD-10-CM

## 2021-10-31 LAB — INR PPP: 1.4

## 2021-11-01 ENCOUNTER — HOME CARE VISIT (OUTPATIENT)
Dept: HOME HEALTH SERVICES | Facility: HOME HEALTHCARE | Age: 86
End: 2021-11-01

## 2021-11-01 ENCOUNTER — LAB (OUTPATIENT)
Dept: LAB | Facility: HOSPITAL | Age: 86
End: 2021-11-01

## 2021-11-01 ENCOUNTER — ANTICOAGULATION VISIT (OUTPATIENT)
Dept: PHARMACY | Facility: HOSPITAL | Age: 86
End: 2021-11-01

## 2021-11-01 ENCOUNTER — TRANSITIONAL CARE MANAGEMENT TELEPHONE ENCOUNTER (OUTPATIENT)
Dept: CALL CENTER | Facility: HOSPITAL | Age: 86
End: 2021-11-01

## 2021-11-01 VITALS
SYSTOLIC BLOOD PRESSURE: 124 MMHG | HEART RATE: 68 BPM | DIASTOLIC BLOOD PRESSURE: 68 MMHG | OXYGEN SATURATION: 98 % | TEMPERATURE: 98.7 F | RESPIRATION RATE: 18 BRPM

## 2021-11-01 DIAGNOSIS — I63.411 CEREBROVASCULAR ACCIDENT (CVA) DUE TO EMBOLISM OF RIGHT MIDDLE CEREBRAL ARTERY (HCC): ICD-10-CM

## 2021-11-01 DIAGNOSIS — Z95.2 HX OF AORTIC VALVE REPLACEMENT, MECHANICAL: ICD-10-CM

## 2021-11-01 DIAGNOSIS — Z95.2 HX OF AORTIC VALVE REPLACEMENT, MECHANICAL: Primary | ICD-10-CM

## 2021-11-01 DIAGNOSIS — N18.31 STAGE 3A CHRONIC KIDNEY DISEASE (HCC): ICD-10-CM

## 2021-11-01 DIAGNOSIS — D50.0 IRON DEFICIENCY ANEMIA DUE TO CHRONIC BLOOD LOSS: ICD-10-CM

## 2021-11-01 DIAGNOSIS — D50.9 IRON DEFICIENCY ANEMIA, UNSPECIFIED IRON DEFICIENCY ANEMIA TYPE: ICD-10-CM

## 2021-11-01 LAB
ANION GAP SERPL CALCULATED.3IONS-SCNC: 9.9 MMOL/L (ref 5–15)
BUN SERPL-MCNC: 40 MG/DL (ref 8–23)
BUN/CREAT SERPL: 25 (ref 7–25)
CALCIUM SPEC-SCNC: 8.6 MG/DL (ref 8.6–10.5)
CHLORIDE SERPL-SCNC: 105 MMOL/L (ref 98–107)
CO2 SERPL-SCNC: 26.1 MMOL/L (ref 22–29)
CREAT SERPL-MCNC: 1.6 MG/DL (ref 0.76–1.27)
DEPRECATED RDW RBC AUTO: 46.4 FL (ref 37–54)
ERYTHROCYTE [DISTWIDTH] IN BLOOD BY AUTOMATED COUNT: 17.7 % (ref 12.3–15.4)
GFR SERPL CREATININE-BSD FRML MDRD: 41 ML/MIN/1.73
GLUCOSE SERPL-MCNC: 105 MG/DL (ref 65–99)
HCT VFR BLD AUTO: 30.2 % (ref 37.5–51)
HGB BLD-MCNC: 8.8 G/DL (ref 13–17.7)
INR PPP: 1.78 (ref 0.9–1.1)
INR PPP: 1.8
MCH RBC QN AUTO: 21.3 PG (ref 26.6–33)
MCHC RBC AUTO-ENTMCNC: 29.1 G/DL (ref 31.5–35.7)
MCV RBC AUTO: 73.1 FL (ref 79–97)
PLATELET # BLD AUTO: 186 10*3/MM3 (ref 140–450)
POTASSIUM SERPL-SCNC: 4.8 MMOL/L (ref 3.5–5.2)
PROTHROMBIN TIME: 20.5 SECONDS (ref 11.7–14.2)
RBC # BLD AUTO: 4.13 10*6/MM3 (ref 4.14–5.8)
SODIUM SERPL-SCNC: 141 MMOL/L (ref 136–145)
WBC # BLD AUTO: 6.29 10*3/MM3 (ref 3.4–10.8)

## 2021-11-01 PROCEDURE — 85027 COMPLETE CBC AUTOMATED: CPT

## 2021-11-01 PROCEDURE — G0299 HHS/HOSPICE OF RN EA 15 MIN: HCPCS

## 2021-11-01 PROCEDURE — 36415 COLL VENOUS BLD VENIPUNCTURE: CPT

## 2021-11-01 PROCEDURE — 80048 BASIC METABOLIC PNL TOTAL CA: CPT

## 2021-11-01 PROCEDURE — 85610 PROTHROMBIN TIME: CPT

## 2021-11-01 NOTE — OUTREACH NOTE
Call Center TCM Note      Responses   [facility] patient discharged from? [place]   Does the patient have one of the following disease processes/diagnoses(primary or secondary)? CHF   TCM attempt successful? Yes   Call start time 1549   Call end time 1602   Discharge diagnosis Pleural effusion on right, iron deficiency anemia, EGD and colonoscopy with polyps   Is patient permission given to speak with other caregiver? Yes   List who call center can speak with Gladys Sequeira, spouse   Person spoke with today (if not patient) and relationship spouse and patient   Medication alerts for this patient doing daily INR with home machine. On home warfarin and lovenox.    Meds reviewed with patient/caregiver? Yes   Is the patient having any side effects they believe may be caused by any medication additions or changes? No   Does the patient have all medications ordered at discharge? Yes   Is the patient taking all medications as directed (includes completed medication regime)? Yes   Comments regarding appointments Cardiology f/u 11/8/21, GI appt 11/16   Does the patient have a primary care provider?  Yes   Does the patient have an appointment with their PCP within 7 days of discharge? No   Comments regarding PCP LIZA Trinh PCP. Wife declines to schedule PCP appt with call today. She reports that patient is following up with specialists.    What is preventing the patient from scheduling follow up appointments within 7 days of discharge? --  [Wife denied need at this time. ]   Nursing Interventions Educated patient on importance of making appointment,  Advised patient to make appointment  [encouraged PCP f/u. ]   Has the patient kept scheduled appointments due by today? N/A   What is the Home health agency?   [facility]    Has home health visited the patient within 72 hours of discharge? Call prior to 72 hours   What DME was ordered? rolling walker from [employer]   Has all DME been delivered? Yes   Pulse Ox monitoring  None   Psychosocial issues? No   Did the patient receive a copy of their discharge instructions? Yes   Nursing interventions Reviewed instructions with patient   What is the patient's perception of their health status since discharge? Same   Is the patient able to teach back signs and symptoms of worsening condition? (i.e. weight gain, shortness of air, etc.) Yes   If the patient is a current smoker, are they able to teach back resources for cessation? Not a smoker   Is the patient/caregiver able to teach back the hierarchy of who to call/visit for symptoms/problems? PCP, Specialist, Home health nurse, Urgent Care, ED, 911 Yes   TCM call completed? Yes           Thuy Mac RN    11/1/2021, 16:02 EDT

## 2021-11-01 NOTE — CASE MANAGEMENT/SOCIAL WORK
Case Management Discharge Note      Final Note: Pt was dc'd home         Selected Continued Care - Discharged on 10/30/2021 Admission date: 10/23/2021 - Discharge disposition: Home or Self Care    Destination    No services have been selected for the patient.              Durable Medical Equipment Coordination complete.    Service Provider Selected Services Address Phone Fax Patient Preferred    [facility]  Durable Medical Equipment [address], [facility] [id number] [phone] --          Dialysis/Infusion    No services have been selected for the patient.              Home Medical Care Coordination complete.    Service Provider Selected Services Address Phone Fax Patient Preferred     [facility]  Home Health Services [address] [place] [address] [facility] [address] [phone] --          Therapy    No services have been selected for the patient.              Community Resources    No services have been selected for the patient.              Community & DME    No services have been selected for the patient.                  Transportation Services  Private: Car    Final Discharge Disposition Code: 01 - home or self-care

## 2021-11-01 NOTE — PROGRESS NOTES
Anticoagulation Clinic Progress Note    Anticoagulation Summary  As of 2021    INR goal:  3.0-3.5   TTR:  66.4 % (3 y)   INR used for dosin.80 (2021)   Warfarin maintenance plan:  7.5 mg every Mon, Wed, Fri; 5 mg all other days   Weekly warfarin total:  42.5 mg   Plan last modified:  Vasquez Niño, PharmD (2020)   Next INR check:  2021   Priority:  High   Target end date:  Indefinite    Indications    Hx of aortic valve replacement  mechanical [Z95.2] [Z95.2]  Atrial fibrillation (HCC) [I48.91]  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]             Anticoagulation Episode Summary     INR check location:      Preferred lab:      Send INR reminders to:  [facility]  POOL    Comments:  New INR goal 3 - 3.5 (see 2020 hospitalization for CVA)      Anticoagulation Care Providers     Provider Role Specialty Phone number    Griffin Fried MD Referring Cardiology [phone]          Clinic Interview:  Patient Findings     Positives:  Change in medications, Hospital admission    Negatives:  Signs/symptoms of thrombosis, Signs/symptoms of bleeding,   Laboratory test error suspected, Change in health, Change in alcohol use,   Change in activity, Upcoming invasive procedure, Emergency department   visit, Upcoming dental procedure, Missed doses, Extra doses, Change in   diet/appetite, Bruising, Other complaints    Comments:  Reports he was recently hospitalized and discharged on enoxaparin 90 mg SC every 12 hours until INR >2.9; new medications include furosemide, ferrous gluconate, and pantoprazole.      Clinical Outcomes     Negatives:  Major bleeding event, Thromboembolic event,   Anticoagulation-related hospital admission, Anticoagulation-related ED   visit, Anticoagulation-related fatality    Comments:  Reports he was recently hospitalized and discharged on enoxaparin 90 mg SC every 12 hours until INR >2.9; new medications  include furosemide, ferrous gluconate, and pantoprazole.        INR History:  Anticoagulation Monitoring 10/13/2021 10/20/2021 11/1/2021   INR 3.10 3.20 1.80   INR Date 10/13/2021 10/20/2021 11/1/2021   INR Goal 3.0-3.5 3.0-3.5 3.0-3.5   Trend Same Same Same   Last Week Total 42.5 mg 42.5 mg 20 mg   Next Week Total 42.5 mg 42.5 mg 45 mg   Sun 5 mg 5 mg -   Mon 7.5 mg 7.5 mg 10 mg (11/1)   Tue 5 mg 5 mg -   Wed 7.5 mg 7.5 mg -   Thu 5 mg 5 mg -   Fri 7.5 mg 7.5 mg -   Sat 5 mg 5 mg -   Visit Report - - -   Some recent data might be hidden       Plan:  1. INR is Subtherapeutic. Will instruct Francisco Sequeira to boost dose today - see above in Anticoagulation Summary.  2. Follow up in 1 day  3. Patient has been instructed to call if any changes in medications, doses, concerns, etc. Patient expresses understanding and has no further questions at this time.      Ralf Reyes III, PharmD

## 2021-11-02 ENCOUNTER — HOSPITAL ENCOUNTER (INPATIENT)
Facility: HOSPITAL | Age: 86
LOS: 14 days | Discharge: HOME | End: 2021-11-16
Attending: EMERGENCY MEDICINE | Admitting: STUDENT IN AN ORGANIZED HEALTH CARE EDUCATION/TRAINING PROGRAM

## 2021-11-02 ENCOUNTER — APPOINTMENT (OUTPATIENT)
Dept: CT IMAGING | Facility: HOSPITAL | Age: 86
End: 2021-11-02

## 2021-11-02 ENCOUNTER — READMISSION MANAGEMENT (OUTPATIENT)
Dept: CALL CENTER | Facility: HOSPITAL | Age: 86
End: 2021-11-02

## 2021-11-02 ENCOUNTER — APPOINTMENT (OUTPATIENT)
Dept: NUCLEAR MEDICINE | Facility: HOSPITAL | Age: 86
End: 2021-11-02

## 2021-11-02 ENCOUNTER — APPOINTMENT (OUTPATIENT)
Dept: GENERAL RADIOLOGY | Facility: HOSPITAL | Age: 86
End: 2021-11-02

## 2021-11-02 DIAGNOSIS — Z79.01 ON WARFARIN THERAPY: ICD-10-CM

## 2021-11-02 DIAGNOSIS — E16.2 HYPOGLYCEMIA: ICD-10-CM

## 2021-11-02 DIAGNOSIS — K92.2 LOWER GI BLEED: Primary | ICD-10-CM

## 2021-11-02 DIAGNOSIS — Z86.39 H/O INSULIN DEPENDENT DIABETES MELLITUS: ICD-10-CM

## 2021-11-02 DIAGNOSIS — E78.5 HYPERLIPIDEMIA, UNSPECIFIED HYPERLIPIDEMIA TYPE: ICD-10-CM

## 2021-11-02 PROBLEM — R82.90 ABNORMAL URINALYSIS: Status: ACTIVE | Noted: 2021-11-02

## 2021-11-02 PROBLEM — Z13.0 SCREENING FOR IRON DEFICIENCY ANEMIA: Status: RESOLVED | Noted: 2018-11-01 | Resolved: 2021-11-02

## 2021-11-02 PROBLEM — N17.9 AKI (ACUTE KIDNEY INJURY): Status: RESOLVED | Noted: 2020-01-14 | Resolved: 2021-11-02

## 2021-11-02 PROBLEM — J90 PLEURAL EFFUSION ON RIGHT: Status: RESOLVED | Noted: 2021-10-24 | Resolved: 2021-11-02

## 2021-11-02 PROBLEM — I63.9 ACUTE CEREBRAL INFARCTION: Status: RESOLVED | Noted: 2020-01-14 | Resolved: 2021-11-02

## 2021-11-02 PROBLEM — Z90.5 HISTORY OF NEPHRECTOMY: Status: ACTIVE | Noted: 2021-11-02

## 2021-11-02 PROBLEM — I69.354 HEMIPARESIS OF LEFT NONDOMINANT SIDE AS LATE EFFECT OF CEREBRAL INFARCTION: Chronic | Status: ACTIVE | Noted: 2021-11-02

## 2021-11-02 PROBLEM — R29.90 STROKE-LIKE SYMPTOM: Status: RESOLVED | Noted: 2020-01-13 | Resolved: 2021-11-02

## 2021-11-02 PROBLEM — R53.1 LEFT-SIDED WEAKNESS: Status: RESOLVED | Noted: 2021-10-24 | Resolved: 2021-11-02

## 2021-11-02 PROBLEM — J90 RECURRENT RIGHT PLEURAL EFFUSION: Status: ACTIVE | Noted: 2021-11-02

## 2021-11-02 LAB
ABO GROUP BLD: NORMAL
ALBUMIN SERPL-MCNC: 3.3 G/DL (ref 3.5–5.2)
ALBUMIN/GLOB SERPL: 1.3 G/DL
ALP SERPL-CCNC: 92 U/L (ref 39–117)
ALT SERPL W P-5'-P-CCNC: 21 U/L (ref 1–41)
ANION GAP SERPL CALCULATED.3IONS-SCNC: 9.2 MMOL/L (ref 5–15)
APTT PPP: 70.3 SECONDS (ref 22.7–35.4)
AST SERPL-CCNC: 23 U/L (ref 1–40)
BACTERIA UR QL AUTO: ABNORMAL /HPF
BASOPHILS # BLD AUTO: 0.07 10*3/MM3 (ref 0–0.2)
BASOPHILS NFR BLD AUTO: 1.2 % (ref 0–1.5)
BILIRUB SERPL-MCNC: 0.3 MG/DL (ref 0–1.2)
BILIRUB UR QL STRIP: NEGATIVE
BLD GP AB SCN SERPL QL: NEGATIVE
BUN SERPL-MCNC: 40 MG/DL (ref 8–23)
BUN/CREAT SERPL: 26.1 (ref 7–25)
CALCIUM SPEC-SCNC: 8.2 MG/DL (ref 8.6–10.5)
CHLORIDE SERPL-SCNC: 111 MMOL/L (ref 98–107)
CLARITY UR: CLEAR
CO2 SERPL-SCNC: 23.8 MMOL/L (ref 22–29)
COLOR UR: YELLOW
CREAT SERPL-MCNC: 1.53 MG/DL (ref 0.76–1.27)
DEPRECATED RDW RBC AUTO: 46.2 FL (ref 37–54)
EOSINOPHIL # BLD AUTO: 0.25 10*3/MM3 (ref 0–0.4)
EOSINOPHIL NFR BLD AUTO: 4.3 % (ref 0.3–6.2)
ERYTHROCYTE [DISTWIDTH] IN BLOOD BY AUTOMATED COUNT: 17.8 % (ref 12.3–15.4)
EXPIRATION DATE: ABNORMAL
FECAL OCCULT BLOOD SCREEN, POC: POSITIVE
FERRITIN SERPL-MCNC: 28.4 NG/ML (ref 30–400)
GFR SERPL CREATININE-BSD FRML MDRD: 44 ML/MIN/1.73
GLOBULIN UR ELPH-MCNC: 2.6 GM/DL
GLUCOSE BLDC GLUCOMTR-MCNC: 102 MG/DL (ref 70–130)
GLUCOSE BLDC GLUCOMTR-MCNC: 104 MG/DL (ref 70–130)
GLUCOSE BLDC GLUCOMTR-MCNC: 124 MG/DL (ref 70–130)
GLUCOSE BLDC GLUCOMTR-MCNC: 181 MG/DL (ref 70–130)
GLUCOSE BLDC GLUCOMTR-MCNC: 275 MG/DL (ref 70–130)
GLUCOSE SERPL-MCNC: 44 MG/DL (ref 65–99)
GLUCOSE UR STRIP-MCNC: NEGATIVE MG/DL
HCT VFR BLD AUTO: 24.6 % (ref 37.5–51)
HCT VFR BLD AUTO: 24.8 % (ref 37.5–51)
HCT VFR BLD AUTO: 27.3 % (ref 37.5–51)
HGB BLD-MCNC: 7.2 G/DL (ref 13–17.7)
HGB BLD-MCNC: 7.4 G/DL (ref 13–17.7)
HGB BLD-MCNC: 8.1 G/DL (ref 13–17.7)
HGB UR QL STRIP.AUTO: NEGATIVE
HYALINE CASTS UR QL AUTO: ABNORMAL /LPF
INR PPP: 2.14 (ref 0.9–1.1)
IRON 24H UR-MRATE: 12 MCG/DL (ref 59–158)
IRON SATN MFR SERPL: 3 % (ref 20–50)
KETONES UR QL STRIP: NEGATIVE
LEUKOCYTE ESTERASE UR QL STRIP.AUTO: ABNORMAL
LYMPHOCYTES # BLD AUTO: 1.06 10*3/MM3 (ref 0.7–3.1)
LYMPHOCYTES NFR BLD AUTO: 18.3 % (ref 19.6–45.3)
Lab: 174
MCH RBC QN AUTO: 21.1 PG (ref 26.6–33)
MCHC RBC AUTO-ENTMCNC: 29.3 G/DL (ref 31.5–35.7)
MCV RBC AUTO: 72.1 FL (ref 79–97)
MONOCYTES # BLD AUTO: 0.42 10*3/MM3 (ref 0.1–0.9)
MONOCYTES NFR BLD AUTO: 7.3 % (ref 5–12)
NEGATIVE CONTROL: NEGATIVE
NEUTROPHILS NFR BLD AUTO: 3.97 10*3/MM3 (ref 1.7–7)
NEUTROPHILS NFR BLD AUTO: 68.7 % (ref 42.7–76)
NITRITE UR QL STRIP: NEGATIVE
NT-PROBNP SERPL-MCNC: 766.6 PG/ML (ref 0–1800)
PH UR STRIP.AUTO: 6.5 [PH] (ref 5–8)
PLATELET # BLD AUTO: 151 10*3/MM3 (ref 140–450)
POSITIVE CONTROL: POSITIVE
POTASSIUM SERPL-SCNC: 4.7 MMOL/L (ref 3.5–5.2)
PROCALCITONIN SERPL-MCNC: 0.12 NG/ML (ref 0–0.25)
PROT SERPL-MCNC: 5.9 G/DL (ref 6–8.5)
PROT UR QL STRIP: NEGATIVE
PROTHROMBIN TIME: 23.7 SECONDS (ref 11.7–14.2)
RBC # BLD AUTO: 3.41 10*6/MM3 (ref 4.14–5.8)
RBC # UR: ABNORMAL /HPF
REF LAB TEST METHOD: ABNORMAL
RH BLD: POSITIVE
SARS-COV-2 RNA PNL SPEC NAA+PROBE: NOT DETECTED
SODIUM SERPL-SCNC: 144 MMOL/L (ref 136–145)
SP GR UR STRIP: 1.02 (ref 1–1.03)
SQUAMOUS #/AREA URNS HPF: ABNORMAL /HPF
T&S EXPIRATION DATE: NORMAL
TIBC SERPL-MCNC: 344 MCG/DL (ref 298–536)
TRANSFERRIN SERPL-MCNC: 231 MG/DL (ref 200–360)
UROBILINOGEN UR QL STRIP: ABNORMAL
WBC # BLD AUTO: 5.78 10*3/MM3 (ref 3.4–10.8)
WBC UR QL AUTO: ABNORMAL /HPF
YEAST URNS QL MICRO: ABNORMAL /HPF

## 2021-11-02 PROCEDURE — 63710000001 INSULIN LISPRO (HUMAN) PER 5 UNITS: Performed by: NURSE PRACTITIONER

## 2021-11-02 PROCEDURE — 99222 1ST HOSP IP/OBS MODERATE 55: CPT | Performed by: INTERNAL MEDICINE

## 2021-11-02 PROCEDURE — 36430 TRANSFUSION BLD/BLD COMPNT: CPT

## 2021-11-02 PROCEDURE — 0 TECHNETIUM LABELED RED BLOOD CELLS: Performed by: HOSPITALIST

## 2021-11-02 PROCEDURE — 71045 X-RAY EXAM CHEST 1 VIEW: CPT

## 2021-11-02 PROCEDURE — 87635 SARS-COV-2 COVID-19 AMP PRB: CPT | Performed by: PHYSICIAN ASSISTANT

## 2021-11-02 PROCEDURE — 86850 RBC ANTIBODY SCREEN: CPT | Performed by: PHYSICIAN ASSISTANT

## 2021-11-02 PROCEDURE — 83880 ASSAY OF NATRIURETIC PEPTIDE: CPT | Performed by: NURSE PRACTITIONER

## 2021-11-02 PROCEDURE — 78278 ACUTE GI BLOOD LOSS IMAGING: CPT

## 2021-11-02 PROCEDURE — 84145 PROCALCITONIN (PCT): CPT | Performed by: NURSE PRACTITIONER

## 2021-11-02 PROCEDURE — 85018 HEMOGLOBIN: CPT | Performed by: PHYSICIAN ASSISTANT

## 2021-11-02 PROCEDURE — 86923 COMPATIBILITY TEST ELECTRIC: CPT

## 2021-11-02 PROCEDURE — 85025 COMPLETE CBC W/AUTO DIFF WBC: CPT | Performed by: PHYSICIAN ASSISTANT

## 2021-11-02 PROCEDURE — 83540 ASSAY OF IRON: CPT | Performed by: NURSE PRACTITIONER

## 2021-11-02 PROCEDURE — 85014 HEMATOCRIT: CPT | Performed by: HOSPITALIST

## 2021-11-02 PROCEDURE — 85018 HEMOGLOBIN: CPT | Performed by: HOSPITALIST

## 2021-11-02 PROCEDURE — 82962 GLUCOSE BLOOD TEST: CPT

## 2021-11-02 PROCEDURE — 86900 BLOOD TYPING SEROLOGIC ABO: CPT

## 2021-11-02 PROCEDURE — 86901 BLOOD TYPING SEROLOGIC RH(D): CPT | Performed by: PHYSICIAN ASSISTANT

## 2021-11-02 PROCEDURE — 74177 CT ABD & PELVIS W/CONTRAST: CPT

## 2021-11-02 PROCEDURE — A9560 TC99M LABELED RBC: HCPCS | Performed by: HOSPITALIST

## 2021-11-02 PROCEDURE — 86900 BLOOD TYPING SEROLOGIC ABO: CPT | Performed by: PHYSICIAN ASSISTANT

## 2021-11-02 PROCEDURE — 25010000002 IOPAMIDOL 61 % SOLUTION: Performed by: EMERGENCY MEDICINE

## 2021-11-02 PROCEDURE — P9016 RBC LEUKOCYTES REDUCED: HCPCS

## 2021-11-02 PROCEDURE — 85610 PROTHROMBIN TIME: CPT | Performed by: PHYSICIAN ASSISTANT

## 2021-11-02 PROCEDURE — 85014 HEMATOCRIT: CPT | Performed by: PHYSICIAN ASSISTANT

## 2021-11-02 PROCEDURE — 99285 EMERGENCY DEPT VISIT HI MDM: CPT

## 2021-11-02 PROCEDURE — 85730 THROMBOPLASTIN TIME PARTIAL: CPT | Performed by: PHYSICIAN ASSISTANT

## 2021-11-02 PROCEDURE — 82270 OCCULT BLOOD FECES: CPT | Performed by: PHYSICIAN ASSISTANT

## 2021-11-02 PROCEDURE — 81001 URINALYSIS AUTO W/SCOPE: CPT | Performed by: PHYSICIAN ASSISTANT

## 2021-11-02 PROCEDURE — 84466 ASSAY OF TRANSFERRIN: CPT | Performed by: NURSE PRACTITIONER

## 2021-11-02 PROCEDURE — 80053 COMPREHEN METABOLIC PANEL: CPT | Performed by: PHYSICIAN ASSISTANT

## 2021-11-02 PROCEDURE — 82728 ASSAY OF FERRITIN: CPT | Performed by: NURSE PRACTITIONER

## 2021-11-02 PROCEDURE — 25010000002 FUROSEMIDE PER 20 MG: Performed by: INTERNAL MEDICINE

## 2021-11-02 PROCEDURE — 99233 SBSQ HOSP IP/OBS HIGH 50: CPT | Performed by: INTERNAL MEDICINE

## 2021-11-02 RX ORDER — SODIUM CHLORIDE 0.9 % (FLUSH) 0.9 %
10 SYRINGE (ML) INJECTION EVERY 12 HOURS SCHEDULED
Status: DISCONTINUED | OUTPATIENT
Start: 2021-11-02 | End: 2021-11-05

## 2021-11-02 RX ORDER — DEXTROSE AND SODIUM CHLORIDE 5; .45 G/100ML; G/100ML
125 INJECTION, SOLUTION INTRAVENOUS CONTINUOUS
Status: DISCONTINUED | OUTPATIENT
Start: 2021-11-02 | End: 2021-11-03

## 2021-11-02 RX ORDER — PANTOPRAZOLE SODIUM 40 MG/10ML
40 INJECTION, POWDER, LYOPHILIZED, FOR SOLUTION INTRAVENOUS EVERY 12 HOURS SCHEDULED
Status: DISCONTINUED | OUTPATIENT
Start: 2021-11-02 | End: 2021-11-15

## 2021-11-02 RX ORDER — MAGNESIUM OXIDE 400 MG/1
400 TABLET ORAL 2 TIMES DAILY
Status: DISCONTINUED | OUTPATIENT
Start: 2021-11-02 | End: 2021-11-16 | Stop reason: HOSPADM

## 2021-11-02 RX ORDER — DEXTROSE MONOHYDRATE 25 G/50ML
25 INJECTION, SOLUTION INTRAVENOUS ONCE
Status: COMPLETED | OUTPATIENT
Start: 2021-11-02 | End: 2021-11-02

## 2021-11-02 RX ORDER — SODIUM CHLORIDE 0.9 % (FLUSH) 0.9 %
10 SYRINGE (ML) INJECTION AS NEEDED
Status: DISCONTINUED | OUTPATIENT
Start: 2021-11-02 | End: 2021-11-05

## 2021-11-02 RX ORDER — DEXTROSE MONOHYDRATE 25 G/50ML
25 INJECTION, SOLUTION INTRAVENOUS
Status: DISCONTINUED | OUTPATIENT
Start: 2021-11-02 | End: 2021-11-16 | Stop reason: HOSPADM

## 2021-11-02 RX ORDER — ATORVASTATIN CALCIUM 20 MG/1
40 TABLET, FILM COATED ORAL DAILY
Status: DISCONTINUED | OUTPATIENT
Start: 2021-11-02 | End: 2021-11-16 | Stop reason: HOSPADM

## 2021-11-02 RX ORDER — NICOTINE POLACRILEX 4 MG
15 LOZENGE BUCCAL
Status: DISCONTINUED | OUTPATIENT
Start: 2021-11-02 | End: 2021-11-16 | Stop reason: HOSPADM

## 2021-11-02 RX ORDER — FUROSEMIDE 10 MG/ML
40 INJECTION INTRAMUSCULAR; INTRAVENOUS ONCE
Status: COMPLETED | OUTPATIENT
Start: 2021-11-02 | End: 2021-11-02

## 2021-11-02 RX ORDER — DIPHENHYDRAMINE HCL 25 MG
25 CAPSULE ORAL 2 TIMES DAILY PRN
Status: DISCONTINUED | OUTPATIENT
Start: 2021-11-02 | End: 2021-11-16 | Stop reason: HOSPADM

## 2021-11-02 RX ORDER — ACETAMINOPHEN 650 MG/1
650 SUPPOSITORY RECTAL EVERY 4 HOURS PRN
Status: DISCONTINUED | OUTPATIENT
Start: 2021-11-02 | End: 2021-11-05

## 2021-11-02 RX ORDER — NITROGLYCERIN 0.4 MG/1
0.4 TABLET SUBLINGUAL
Status: DISCONTINUED | OUTPATIENT
Start: 2021-11-02 | End: 2021-11-16 | Stop reason: HOSPADM

## 2021-11-02 RX ORDER — DEXTROSE MONOHYDRATE 25 G/50ML
25 INJECTION, SOLUTION INTRAVENOUS ONCE
Status: DISCONTINUED | OUTPATIENT
Start: 2021-11-02 | End: 2021-11-16 | Stop reason: HOSPADM

## 2021-11-02 RX ORDER — CALCIUM CARBONATE 200(500)MG
2 TABLET,CHEWABLE ORAL 2 TIMES DAILY PRN
Status: DISCONTINUED | OUTPATIENT
Start: 2021-11-02 | End: 2021-11-05

## 2021-11-02 RX ORDER — DIGOXIN 250 MCG
125 TABLET ORAL DAILY
Status: DISCONTINUED | OUTPATIENT
Start: 2021-11-02 | End: 2021-11-16 | Stop reason: HOSPADM

## 2021-11-02 RX ORDER — ONDANSETRON 4 MG/1
4 TABLET, FILM COATED ORAL EVERY 6 HOURS PRN
Status: DISCONTINUED | OUTPATIENT
Start: 2021-11-02 | End: 2021-11-16 | Stop reason: HOSPADM

## 2021-11-02 RX ORDER — GUAIFENESIN 600 MG/1
600 TABLET, EXTENDED RELEASE ORAL EVERY 12 HOURS SCHEDULED
Status: DISCONTINUED | OUTPATIENT
Start: 2021-11-02 | End: 2021-11-16 | Stop reason: HOSPADM

## 2021-11-02 RX ORDER — INSULIN LISPRO 100 [IU]/ML
0-9 INJECTION, SOLUTION INTRAVENOUS; SUBCUTANEOUS
Status: DISCONTINUED | OUTPATIENT
Start: 2021-11-02 | End: 2021-11-16 | Stop reason: HOSPADM

## 2021-11-02 RX ORDER — HYDROCODONE BITARTRATE AND ACETAMINOPHEN 5; 325 MG/1; MG/1
1 TABLET ORAL EVERY 6 HOURS PRN
Status: DISPENSED | OUTPATIENT
Start: 2021-11-02 | End: 2021-11-15

## 2021-11-02 RX ORDER — FERROUS SULFATE 325(65) MG
325 TABLET ORAL
Status: DISCONTINUED | OUTPATIENT
Start: 2021-11-02 | End: 2021-11-16 | Stop reason: HOSPADM

## 2021-11-02 RX ORDER — METOPROLOL SUCCINATE 25 MG/1
25 TABLET, EXTENDED RELEASE ORAL DAILY
Status: DISCONTINUED | OUTPATIENT
Start: 2021-11-02 | End: 2021-11-16 | Stop reason: HOSPADM

## 2021-11-02 RX ORDER — ACETAMINOPHEN 325 MG/1
650 TABLET ORAL EVERY 4 HOURS PRN
Status: DISCONTINUED | OUTPATIENT
Start: 2021-11-02 | End: 2021-11-16 | Stop reason: HOSPADM

## 2021-11-02 RX ORDER — ONDANSETRON 2 MG/ML
4 INJECTION INTRAMUSCULAR; INTRAVENOUS EVERY 6 HOURS PRN
Status: DISCONTINUED | OUTPATIENT
Start: 2021-11-02 | End: 2021-11-16 | Stop reason: HOSPADM

## 2021-11-02 RX ORDER — SODIUM BICARBONATE 650 MG/1
1950 TABLET ORAL 2 TIMES DAILY
Status: DISCONTINUED | OUTPATIENT
Start: 2021-11-02 | End: 2021-11-13

## 2021-11-02 RX ORDER — ACETAMINOPHEN 160 MG/5ML
650 SOLUTION ORAL EVERY 4 HOURS PRN
Status: DISCONTINUED | OUTPATIENT
Start: 2021-11-02 | End: 2021-11-05

## 2021-11-02 RX ADMIN — SODIUM BICARBONATE 1950 MG: 650 TABLET ORAL at 15:38

## 2021-11-02 RX ADMIN — MAGNESIUM OXIDE 400 MG (241.3 MG MAGNESIUM) TABLET 400 MG: TABLET at 20:01

## 2021-11-02 RX ADMIN — PANTOPRAZOLE SODIUM 40 MG: 40 INJECTION, POWDER, FOR SOLUTION INTRAVENOUS at 20:01

## 2021-11-02 RX ADMIN — GUAIFENESIN 600 MG: 600 TABLET, EXTENDED RELEASE ORAL at 15:39

## 2021-11-02 RX ADMIN — SODIUM CHLORIDE, PRESERVATIVE FREE 10 ML: 5 INJECTION INTRAVENOUS at 09:39

## 2021-11-02 RX ADMIN — HYDROCODONE BITARTRATE AND ACETAMINOPHEN 1 TABLET: 5; 325 TABLET ORAL at 17:24

## 2021-11-02 RX ADMIN — INSULIN LISPRO 6 UNITS: 100 INJECTION, SOLUTION INTRAVENOUS; SUBCUTANEOUS at 20:09

## 2021-11-02 RX ADMIN — SODIUM CHLORIDE, PRESERVATIVE FREE 10 ML: 5 INJECTION INTRAVENOUS at 20:01

## 2021-11-02 RX ADMIN — DEXTROSE AND SODIUM CHLORIDE 125 ML/HR: 5; 450 INJECTION, SOLUTION INTRAVENOUS at 05:25

## 2021-11-02 RX ADMIN — ATORVASTATIN CALCIUM 40 MG: 20 TABLET, FILM COATED ORAL at 15:39

## 2021-11-02 RX ADMIN — SODIUM BICARBONATE 1950 MG: 650 TABLET ORAL at 20:01

## 2021-11-02 RX ADMIN — IOPAMIDOL 85 ML: 612 INJECTION, SOLUTION INTRAVENOUS at 05:11

## 2021-11-02 RX ADMIN — MAGNESIUM OXIDE 400 MG (241.3 MG MAGNESIUM) TABLET 400 MG: TABLET at 15:39

## 2021-11-02 RX ADMIN — DEXTROSE MONOHYDRATE 25 G: 25 INJECTION, SOLUTION INTRAVENOUS at 04:19

## 2021-11-02 RX ADMIN — TECHNETIUM TC 99M-LABELED RED BLOOD CELLS 1 DOSE: KIT at 13:40

## 2021-11-02 RX ADMIN — DEXTROSE AND SODIUM CHLORIDE 125 ML/HR: 5; 450 INJECTION, SOLUTION INTRAVENOUS at 22:15

## 2021-11-02 RX ADMIN — DEXTROSE AND SODIUM CHLORIDE 125 ML/HR: 5; 450 INJECTION, SOLUTION INTRAVENOUS at 15:40

## 2021-11-02 RX ADMIN — PANTOPRAZOLE SODIUM 40 MG: 40 INJECTION, POWDER, FOR SOLUTION INTRAVENOUS at 09:41

## 2021-11-02 RX ADMIN — METOPROLOL SUCCINATE 25 MG: 25 TABLET, EXTENDED RELEASE ORAL at 15:38

## 2021-11-02 RX ADMIN — DIGOXIN 125 MCG: 250 TABLET ORAL at 15:39

## 2021-11-02 RX ADMIN — GUAIFENESIN 600 MG: 600 TABLET, EXTENDED RELEASE ORAL at 20:01

## 2021-11-02 RX ADMIN — FUROSEMIDE 40 MG: 20 INJECTION, SOLUTION INTRAMUSCULAR; INTRAVENOUS at 12:30

## 2021-11-02 RX ADMIN — HYDROCODONE BITARTRATE AND ACETAMINOPHEN 1 TABLET: 5; 325 TABLET ORAL at 23:47

## 2021-11-02 RX ADMIN — FERROUS SULFATE TAB 325 MG (65 MG ELEMENTAL FE) 325 MG: 325 (65 FE) TAB at 15:39

## 2021-11-02 NOTE — CASE COMMUNICATION
SOC COMPLETED ON PATIENT     LAURA XIE  : 39    NEED TO MAKE YOU AWARE OF THE FOLLOWING MAJOR DRUG INTERACTIONS   Drug-Drug: warfarin and Aspirin Adult Low Strength   The risk of bleeding, particularly gastrointestinal, may be increased by coadministration of warfarin with Aspirin Adult Low Strength. However, use of low-dose aspirin with warfarin may provide benefit that outweighs the risk of minor bleeding.   Details Mary Ellen r   warfarin (COUMADIN) 5 MG tablet   Long-term.     Aspirin Adult Low Strength 81 MG EC tablet     warfarin (COUMADIN) tablet 7.5 mg   Ended. Long-term.     warfarin (COUMADIN) (dosing per levels)   Discontinued. Long-term.     Pharmacy to dose warfarin   Discontinued. Long-term.     warfarin (COUMADIN) tablet 1 mg   Discontinued. Long-term.   Drug-Drug  <jscript:void(0)>  Drug-Drug: Aspirin Adult Low Strength and enoxaparin   The risk  of bleeding in enoxaparin treated patients may be increased by Aspirin Adult Low Strength, including the development of procedure-related epidural or spinal hematomas.   Details Major

## 2021-11-02 NOTE — ED PROVIDER NOTES
EMERGENCY DEPARTMENT ENCOUNTER    Room Number:  05/05  Date of encounter:  11/2/2021  PCP: Rubin Hinkle APRN  Historian: Patient      HPI:  Chief Complaint: GI bleeding  A complete HPI/ROS/PMH/PSH/SH/FH are unobtainable due to: Nothing    Context: Francisco Sequeira is a 85 y.o. male who presents to the ED c/o GI bleeding that began this evening.  Patient states he went to have a bowel movement and noticed it was significant blood.  He informs me he was recently admitted to the hospital and evaluated for GI bleed at that time he had an EGD and a colonoscopy.  He states for the past 24 hours and leading up to the bleeding tonight he has been feeling weaker than normal.  He denies chest pain, fever, chills, nausea, vomiting, abdominal pain associated with his GI bleeding.      Patient was admitted to the hospital for questionable TIA, generalized weakness, acute renal failure, symptomatic anemia, anticoagulated on Coumadin on 10/23/2021 in the hospital through 10/30/2021.  He has a history of mechanical aortic valve, A. fib, strokes.  While in the hospital he was found to be in CHF and had to be diuresed.  Thoracentesis was performed which showed a transudate.  GI was consulted for the anemia and the patient had to be put on a heparin drip.  Colonoscopy showed 7 polyps which were removed.  EGD showed gastritis and duodenal erosion.  He was started on a PPI.  He was to follow-up with Dr. Gotti after discharge to determine if a small bowel capsule endoscope is needed      PAST MEDICAL HISTORY  Active Ambulatory Problems     Diagnosis Date Noted   • Atrial fibrillation (HCC)    • Hypertension    • Atopic rhinitis 03/21/2016   • Gastroesophageal reflux disease 03/21/2016   • Hyperlipidemia 03/21/2016   • Uncontrolled type 2 diabetes mellitus with complication, with long-term current use of insulin (HCC) 03/21/2016   • Renal insufficiency 03/31/2017   • Low testosterone 04/03/2017   • Medicare annual wellness visit,  subsequent 10/30/2017   • Hx of aortic valve replacement, mechanical [Z95.2] 09/19/2018   • Screening for iron deficiency anemia 11/01/2018   • Stroke-like symptom 01/13/2020   • Kidney carcinoma (HCC) 01/13/2020   • CKD (chronic kidney disease) stage 3, GFR 30-59 ml/min (HCC) 01/13/2020   • BYRON (acute kidney injury) ([facility]) 01/14/2020   • Acute cerebral infarction ([facility]) 01/14/2020   • Cerebrovascular accident (CVA) due to embolism of right middle cerebral artery ([facility]) 04/09/2020   • Iron deficiency anemia    • Chronic anticoagulation    • Left-sided weakness 10/24/2021   • Pleural effusion on right 10/24/2021     Resolved Ambulatory Problems     Diagnosis Date Noted   • Cardiomyopathy ([facility])    • Uncontrolled type 2 diabetes mellitus ([facility]) 03/21/2016   • Poison ivy 09/06/2016   • Low testosterone 04/07/2017   • Sinusitis 05/01/2018   • Hx of mitral valve replacement with mechanical valve [Z95.2] 09/19/2018     Past Medical History:   Diagnosis Date   • Abnormal electrocardiogram    • Allergic rhinitis    • Aortic valve insufficiency    • Ascending aortic aneurysm ([facility])    • Bacteremia    • Calcific aortic stenosis of bicuspid valve    • Cardiac arrest ([facility])    • Contact dermatitis due to poison ivy    • Diabetes mellitus ([facility])    • Elbow fracture    • Esophageal reflux    • GERD (gastroesophageal reflux disease)    • Head injury    • Hyperglycemia    • Leg swelling    • Localized swelling of both lower legs    • Nasal congestion    • Nasal drainage    • Nonischemic cardiomyopathy ([facility])    • Palpitations    • Pedal edema    • Pleural effusion on left    • Renal insufficiency syndrome    • Renal oncocytoma    • Seasonal allergic reaction    • Syncope    • Type 2 diabetes mellitus ([facility])    • Vision changes    • Visual field defect          PAST SURGICAL HISTORY  Past Surgical History:   Procedure Laterality Date   • AORTIC VALVE REPAIR/REPLACEMENT     • ASCENDING AORTIC ANEURYSM REPAIR W/ MECHANICAL AORTIC VALVE  REPLACEMENT     • COLONOSCOPY N/A 10/28/2021    Procedure: COLONOSCOPY to cecum:  cold snare polyps,;  Surgeon: Dinesh Meza MD;  Location: [facility] ENDOSCOPY;  Service: Gastroenterology;  Laterality: N/A;  pre:  Iron deficiency anemia  post:  polyps, diverticulosis,    • ENDOSCOPY N/A 10/28/2021    Procedure: ESOPHAGOGASTRODUODENOSCOPY with biopsies;  Surgeon: Dinesh Meza MD;  Location: [facility] ENDOSCOPY;  Service: Gastroenterology;  Laterality: N/A;  pre:  Iron deficiency anemia  post:  duodenitis and gastritis   • EYE SURGERY  12/09/2020    cataract surgery    • NEPHRECTOMY     • OTHER SURGICAL HISTORY      elbow surgery   • PROSTATE SURGERY     • THORACENTESIS Left     diagnostic         FAMILY HISTORY  Family History   Problem Relation Age of Onset   • Cancer Mother         colon   • Cancer Brother         colon         SOCIAL HISTORY  Social History     Socioeconomic History   • Marital status:    Tobacco Use   • Smoking status: Former Smoker   • Smokeless tobacco: Former User   • Tobacco comment: caffeine use   Substance and Sexual Activity   • Alcohol use: Yes     Comment: occasional   • Drug use: No   • Sexual activity: Defer         ALLERGIES  Penicillins, Percocet  [oxycodone-acetaminophen], and Other        REVIEW OF SYSTEMS  Review of Systems   Constitutional: Positive for fatigue.   HENT: Negative.    Respiratory: Negative.    Cardiovascular: Negative.    Gastrointestinal: Positive for blood in stool. Negative for abdominal pain, diarrhea and nausea.   Genitourinary: Negative.    Musculoskeletal: Negative.    Skin: Negative.    Neurological: Positive for weakness.        All systems reviewed and negative except for those discussed in HPI.       PHYSICAL EXAM    I have reviewed the triage vital signs and nursing notes.    ED Triage Vitals   Temp Heart Rate Resp BP SpO2   11/02/21 0114 11/02/21 0114 11/02/21 0114 11/02/21 0114 11/02/21 0117   97.1 °F (36.2 °C) 83 18 111/58 93 %       Temp src Heart Rate Source Patient Position BP Location FiO2 (%)   -- -- -- -- --              Physical Exam  GENERAL: Somewhat frail in appearance, nontoxic, no acute distress  HENT: nares patent, face symmetric   EYES: no scleral icterus  CV: regular rhythm, regular rate  RESPIRATORY: normal effort, lungs CTA B  ABDOMEN: soft, nontender, no mass  Rectal exam: Maroon appearing stool  MUSCULOSKELETAL: no deformity  NEURO: alert oriented x4, moves all extremities, follows commands  SKIN: warm, dry        LAB RESULTS  Recent Results (from the past 24 hour(s))   CBC (No Diff)    Collection Time: 11/01/21  9:41 AM    Specimen: Blood   Result Value Ref Range    WBC 6.29 3.40 - 10.80 10*3/mm3    RBC 4.13 (L) 4.14 - 5.80 10*6/mm3    Hemoglobin 8.8 (L) 13.0 - 17.7 g/dL    Hematocrit 30.2 (L) 37.5 - 51.0 %    MCV 73.1 (L) 79.0 - 97.0 fL    MCH 21.3 (L) 26.6 - 33.0 pg    MCHC 29.1 (L) 31.5 - 35.7 g/dL    RDW 17.7 (H) 12.3 - 15.4 %    RDW-SD 46.4 37.0 - 54.0 fl    Platelets 186 140 - 450 10*3/mm3   Protime-INR    Collection Time: 11/01/21  9:41 AM    Specimen: Blood   Result Value Ref Range    Protime 20.5 (H) 11.7 - 14.2 Seconds    INR 1.78 (H) 0.90 - 1.10   Basic Metabolic Panel    Collection Time: 11/01/21  9:41 AM    Specimen: Blood   Result Value Ref Range    Glucose 105 (H) 65 - 99 mg/dL    BUN 40 (H) 8 - 23 mg/dL    Creatinine 1.60 (H) 0.76 - 1.27 mg/dL    Sodium 141 136 - 145 mmol/L    Potassium 4.8 3.5 - 5.2 mmol/L    Chloride 105 98 - 107 mmol/L    CO2 26.1 22.0 - 29.0 mmol/L    Calcium 8.6 8.6 - 10.5 mg/dL    eGFR Non African Amer 41 (L) >60 mL/min/1.73    BUN/Creatinine Ratio 25.0 7.0 - 25.0    Anion Gap 9.9 5.0 - 15.0 mmol/L   Comprehensive Metabolic Panel    Collection Time: 11/02/21  3:24 AM    Specimen: Blood   Result Value Ref Range    Glucose 44 (C) 65 - 99 mg/dL    BUN 40 (H) 8 - 23 mg/dL    Creatinine 1.53 (H) 0.76 - 1.27 mg/dL    Sodium 144 136 - 145 mmol/L    Potassium 4.7 3.5 - 5.2 mmol/L     Chloride 111 (H) 98 - 107 mmol/L    CO2 23.8 22.0 - 29.0 mmol/L    Calcium 8.2 (L) 8.6 - 10.5 mg/dL    Total Protein 5.9 (L) 6.0 - 8.5 g/dL    Albumin 3.30 (L) 3.50 - 5.20 g/dL    ALT (SGPT) 21 1 - 41 U/L    AST (SGOT) 23 1 - 40 U/L    Alkaline Phosphatase 92 39 - 117 U/L    Total Bilirubin 0.3 0.0 - 1.2 mg/dL    eGFR Non African Amer 44 (L) >60 mL/min/1.73    Globulin 2.6 gm/dL    A/G Ratio 1.3 g/dL    BUN/Creatinine Ratio 26.1 (H) 7.0 - 25.0    Anion Gap 9.2 5.0 - 15.0 mmol/L   aPTT    Collection Time: 11/02/21  3:24 AM    Specimen: Blood   Result Value Ref Range    PTT 70.3 (H) 22.7 - 35.4 seconds   Protime-INR    Collection Time: 11/02/21  3:24 AM    Specimen: Blood   Result Value Ref Range    Protime 23.7 (H) 11.7 - 14.2 Seconds    INR 2.14 (H) 0.90 - 1.10   Type & Screen    Collection Time: 11/02/21  3:24 AM    Specimen: Blood   Result Value Ref Range    ABO Type O     RH type Positive     Antibody Screen Negative     T&S Expiration Date 11/5/2021 11:59:59 PM    CBC Auto Differential    Collection Time: 11/02/21  3:24 AM    Specimen: Blood   Result Value Ref Range    WBC 5.78 3.40 - 10.80 10*3/mm3    RBC 3.41 (L) 4.14 - 5.80 10*6/mm3    Hemoglobin 7.2 (L) 13.0 - 17.7 g/dL    Hematocrit 24.6 (L) 37.5 - 51.0 %    MCV 72.1 (L) 79.0 - 97.0 fL    MCH 21.1 (L) 26.6 - 33.0 pg    MCHC 29.3 (L) 31.5 - 35.7 g/dL    RDW 17.8 (H) 12.3 - 15.4 %    RDW-SD 46.2 37.0 - 54.0 fl    Platelets 151 140 - 450 10*3/mm3    Neutrophil % 68.7 42.7 - 76.0 %    Lymphocyte % 18.3 (L) 19.6 - 45.3 %    Monocyte % 7.3 5.0 - 12.0 %    Eosinophil % 4.3 0.3 - 6.2 %    Basophil % 1.2 0.0 - 1.5 %    Neutrophils, Absolute 3.97 1.70 - 7.00 10*3/mm3    Lymphocytes, Absolute 1.06 0.70 - 3.10 10*3/mm3    Monocytes, Absolute 0.42 0.10 - 0.90 10*3/mm3    Eosinophils, Absolute 0.25 0.00 - 0.40 10*3/mm3    Basophils, Absolute 0.07 0.00 - 0.20 10*3/mm3   Urinalysis With Microscopic If Indicated (No Culture) - Urine, Clean Catch    Collection Time: 11/02/21   3:34 AM    Specimen: Urine, Clean Catch   Result Value Ref Range    Color, UA Yellow Yellow, Straw    Appearance, UA Clear Clear    pH, UA 6.5 5.0 - 8.0    Specific Gravity, UA 1.016 1.005 - 1.030    Glucose, UA Negative Negative    Ketones, UA Negative Negative    Bilirubin, UA Negative Negative    Blood, UA Negative Negative    Protein, UA Negative Negative    Leuk Esterase, UA Moderate (2+) (A) Negative    Nitrite, UA Negative Negative    Urobilinogen, UA 0.2 E.U./dL 0.2 - 1.0 E.U./dL   Urinalysis, Microscopic Only - Urine, Clean Catch    Collection Time: 11/02/21  3:34 AM    Specimen: Urine, Clean Catch   Result Value Ref Range    RBC, UA 0-2 None Seen, 0-2 /HPF    WBC, UA 21-30 (A) None Seen, 0-2 /HPF    Bacteria, UA 4+ (A) None Seen /HPF    Squamous Epithelial Cells, UA 0-2 None Seen, 0-2 /HPF    Yeast, UA Moderate/2+ Budding Yeast None Seen /HPF    Hyaline Casts, UA 0-2 None Seen /LPF    Methodology Manual Light Microscopy    Prepare RBC, 2 Units    Collection Time: 11/02/21  4:52 AM   Result Value Ref Range    Product Code H5020U69     Unit Number [id number]     UNIT  ABO O     UNIT  RH POS     Crossmatch Interpretation Compatible     Dispense Status XM     Blood Expiration Date 202112062359     Blood Type Barcode 5100     Product Code S1652I97     Unit Number [id number]     UNIT  ABO O     UNIT  RH POS     Crossmatch Interpretation Compatible     Dispense Status XM     Blood Expiration Date 202112062359     Blood Type Barcode 5100    Hemoglobin & Hematocrit, Blood    Collection Time: 11/02/21  4:55 AM    Specimen: Blood   Result Value Ref Range    Hemoglobin 7.4 (L) 13.0 - 17.7 g/dL    Hematocrit 24.8 (L) 37.5 - 51.0 %   POC Glucose Once    Collection Time: 11/02/21  4:57 AM    Specimen: Blood   Result Value Ref Range    Glucose 124 70 - 130 mg/dL   COVID-19,[facility] IN-HOUSE CEPHEID/PORFIRIO NP SWAB IN TRANSPORT MEDIA 8-12 HR TAT - Swab, Nasopharynx    Collection Time: 11/02/21  5:26 AM    Specimen:  Nasopharynx; Swab   Result Value Ref Range    COVID19 Not Detected Not Detected - Ref. Range   POCT Occult Blood Stool    Collection Time: 11/02/21  5:34 AM    Specimen: Per Rectum; Stool   Result Value Ref Range    Fecal Occult Blood Positive (A) Negative    Lot Number [id number]     Expiration Date 04/30/2022     Positive Control Positive Positive    Negative Control Negative Negative   POC Glucose Once    Collection Time: 11/02/21  6:10 AM    Specimen: Blood   Result Value Ref Range    Glucose 104 70 - 130 mg/dL       Ordered the above labs and independently reviewed the results.        RADIOLOGY  CT Abdomen Pelvis With Contrast    Result Date: 11/2/2021  CT OF THE ABDOMEN AND PELVIS WITH CONTRAST  HISTORY: GI bleeding  COMPARISON: 10/25/2021 and other prior imaging  TECHNIQUE: Axial CT imaging was obtained through the abdomen and pelvis. IV contrast was administered.  FINDINGS: Large right pleural effusion is again noted. It appears slightly smaller than on prior exam. A previously identified left pleural effusion has almost completely resolved. The stomach and duodenum appear unremarkable. There is cholelithiasis. Right kidney is absent. Suspected tiny cyst are seen within the liver. Spleen and pancreas appear within normal limits. There is no hydronephrosis. The patient has multiple tiny low-attenuation lesions within the left kidney. Several of these measure higher in density than simple cysts. Further assessment with renal protocol CT or MRI is recommended. There is calcification of the aorta. Urinary bladder is unremarkable. Prostate gland appears to be absent. There is colonic diverticulosis. There is no evidence of diverticulitis. Stool burden does appear mildly increased, which may reflect some constipation. There is no evidence of mechanical bowel obstruction. The appendix is normal. No acute osseous abnormalities are seen.       1. No obvious etiology for the patient's GI bleeding is identified. Patient does  have colonic diverticulosis. There is no diverticulitis. 2. Large right pleural effusion. This may be slightly smaller than on prior study. Previously identified left pleural effusion has almost completely resolved.  Radiation dose reduction techniques were utilized, including automated exposure control and exposure modulation based on body size.  This report was finalized on 11/2/2021 5:38 AM by Dr. Yue Frye M.D.        I ordered the above noted radiological studies. Reviewed by me and discussed with radiologist.  See dictation for official radiology interpretation.      PROCEDURES    Procedures      MEDICATIONS GIVEN IN ER    Medications   dextrose 5 % and sodium chloride 0.45 % infusion (125 mL/hr Intravenous New Bag 11/2/21 0525)   dextrose (D50W) (25 g/50 mL) IV injection 25 g (has no administration in time range)   dextrose (D50W) (25 g/50 mL) IV injection 25 g (25 g Intravenous Given 11/2/21 0419)   iopamidol (ISOVUE-300) 61 % injection 100 mL (85 mL Intravenous Given 11/2/21 0511)         PROGRESS, DATA ANALYSIS, CONSULTS, AND MEDICAL DECISION MAKING    All labs have been independently reviewed by me.  All radiology studies have been reviewed by me and discussed with radiologist dictating the report.   EKG's independently viewed and interpreted by me.  Discussion below represents my analysis of pertinent findings related to patient's condition, differential diagnosis, treatment plan and final disposition.    Working diagnosis is GI bleed.  Suspect this is below the ligament of Treitz.  Given the rectal exam and the color of the stool I suspect this is most likely coming from one of the polyp removal sites.  Further evaluate the patient will obtain a CBC, CMP, PT/INR, PTT, type and screen, guaiac study, CT abdomen pelvis with IV contrast.  Please see below for MDM and timeline of events.     ED Course as of 11/02/21 0621   Tue Nov 02, 2021 0211 BP: 111/58 [RC]   0211 Temp: 97.1 °F (36.2 °C) [RC]    0211 Heart Rate: 83 [RC]   0211 Resp: 18 [RC]   0211 SpO2: 93 %  Room air [RC]   0520 Discussed the patient's case with Dr. Gotti.  GI will see the patient in consult.  It is recommended we call cardiology to discuss management of the patient's warfarin and to see if this needs to be changed to heparin so it can be easily stopped. [RC]   0521 Discussed the patient's case with LIZA Little with A.  To admit to Dr. Lam's care [RC]   0530 Patient's repeat hemoglobin at 5 AM is 7.4.  At 3:24 AM 7.2.  The bleed appears to at least stabilized. [RC]   0538 Discussed the patient's case with Dr. Farrar.  Given the warfarin is generally administered in the evening and the patient still needs to be evaluated by GI.  To hold off on stopping warfarin and starting heparin at this time. [RC]      ED Course User Index  [RC] Kirk Mcdonald III, PA           Patient was placed in face mask in first look. Patient was wearing facemask when I entered the room and throughout our encounter. I wore full protective equipment throughout this patient encounter including a face mask, and gloves. Hand hygiene was performed before donning protective equipment and after removal when leaving the room.    AS OF 06:21 EDT VITALS:    BP - 117/63  HR - 83  TEMP - 97.1 °F (36.2 °C)  O2 SATS - 93%        DIAGNOSIS  Final diagnoses:   Lower GI bleed   Hypoglycemia   On warfarin therapy   H/O insulin dependent diabetes mellitus         DISPOSITION  ADMISSION    Discussed treatment plan and reason for admission with pt/family and admitting physician.  Pt/family voiced understanding of the plan for admission for further testing/treatment as needed.              Kirk Mcdonald III, PA  11/02/21 0621       Isaias Lowery MD  11/02/21 0622

## 2021-11-02 NOTE — ED NOTES
Patient complaining of hematuria. He takes warfarin and was recently admitted to the hospital and required a blood transfusion. Patient feels fatigued and short of air with exertion.      Jaki Kaur RN  11/02/21 0120

## 2021-11-02 NOTE — H&P
Patient Name:  Francisco Sequeira  YOB: 1935  MRN:  [id number]  Admit Date:  11/2/2021  Patient Care Team:  Rubin Hinkle APRN as PCP - General (Family Medicine)  Audra Vazquez RPH as Pharmacist  Vasquez Niño, PharmD as Pharmacist (Pharmacy)      Subjective   History Present Illness     Chief Complaint   Patient presents with   • Black or Bloody Stool       Mr. Sequeira is a 85 y.o. with a history of CVA, TIA, chronic LLE weakness, DM2, atrial fibrillation, chronic anticoagulation, mechanical valve, HLD, HTN who was just discharged on facility on 10/30.  He presents with melena and bright red blood per rectum onset.  Per wife, he had a little bit of black stool on Sunday but then yesterday evening had a large amount of black and maroon stool.  Associated symptoms include generalized weakness.  He denies hematemesis, nausea, vomiting, chest pain, abdominal pain.  Last admission he underwent a colonoscopy with 7 polyps removed and EGD with gastritis and duodenal erosion.  Patient was supposed to have an outpatient capsule capsule but not yet performed.  At the time of discharge he was sent on his Coumadin and Lovenox to meet goal INR 3-3.5.  Patient's hemoglobin was 9.3 when he left on 10/30 but 7.2 on admission.  Patient has positive stool on rectal exam with maroon blood.   Of note last admission he was also ruled out for an acute CVA. He was treated for right> left effusion with diuretics and pulmonary performed a thoracentesis which showed transudate.  He denies chest pain or shortness of breath at this time.       History of Present Illness  Review of Systems   Constitutional: Positive for fatigue. Negative for activity change, appetite change, chills and fever.   HENT: Negative for trouble swallowing.    Eyes: Negative for visual disturbance.   Respiratory: Negative for cough, choking, chest tightness and shortness of breath.    Cardiovascular: Negative for chest pain.   Gastrointestinal:  Positive for blood in stool. Negative for abdominal distention, abdominal pain, constipation, diarrhea, nausea and vomiting.   Genitourinary: Negative for dysuria.   Musculoskeletal: Negative for back pain.   Skin: Negative for pallor.   Neurological: Positive for weakness. Negative for dizziness.   Psychiatric/Behavioral: Negative for confusion.        Personal History     Past Medical History:   Diagnosis Date   • Abnormal electrocardiogram    • Allergic rhinitis    • Aortic valve insufficiency    • Ascending aortic aneurysm (HCC)    • Atrial fibrillation (HCC)    • Bacteremia    • Calcific aortic stenosis of bicuspid valve    • Cardiac arrest (HCC)    • Cardiomyopathy (HCC)    • Contact dermatitis due to poison ivy    • Diabetes mellitus (HCC)    • Elbow fracture    • Esophageal reflux    • GERD (gastroesophageal reflux disease)    • Head injury    • History of transfusion    • Hyperglycemia    • Hyperlipidemia    • Hypertension    • Kidney carcinoma (HCC)    • Leg swelling    • Localized swelling of both lower legs    • Nasal congestion    • Nasal drainage    • Nonischemic cardiomyopathy (HCC)    • Palpitations    • Pedal edema    • Pleural effusion on left    • Renal insufficiency syndrome    • Renal oncocytoma    • Seasonal allergic reaction    • Sinusitis    • Syncope    • Type 2 diabetes mellitus (HCC)     uncontrolled   • Vision changes    • Visual field defect      Past Surgical History:   Procedure Laterality Date   • AORTIC VALVE REPAIR/REPLACEMENT     • ASCENDING AORTIC ANEURYSM REPAIR W/ MECHANICAL AORTIC VALVE REPLACEMENT     • COLONOSCOPY N/A 10/28/2021    Procedure: COLONOSCOPY to cecum:  cold snare polyps,;  Surgeon: Dinesh Meza MD;  Location: [facility] ENDOSCOPY;  Service: Gastroenterology;  Laterality: N/A;  pre:  Iron deficiency anemia  post:  polyps, diverticulosis,    • ENDOSCOPY N/A 10/28/2021    Procedure: ESOPHAGOGASTRODUODENOSCOPY with biopsies;  Surgeon: Dinesh Meza MD;   Location: [facility] ENDOSCOPY;  Service: Gastroenterology;  Laterality: N/A;  pre:  Iron deficiency anemia  post:  duodenitis and gastritis   • EYE SURGERY  12/09/2020    cataract surgery    • NEPHRECTOMY     • OTHER SURGICAL HISTORY      elbow surgery   • PROSTATE SURGERY     • THORACENTESIS Left     diagnostic     Family History   Problem Relation Age of Onset   • Cancer Mother         colon   • Cancer Brother         colon     Social History     Tobacco Use   • Smoking status: Former Smoker   • Smokeless tobacco: Former User   • Tobacco comment: caffeine use   Substance Use Topics   • Alcohol use: Yes     Comment: occasional   • Drug use: No     No current facility-administered medications on file prior to encounter.     Current Outpatient Medications on File Prior to Encounter   Medication Sig Dispense Refill   • ACCU-CHEK FASTCLIX LANCETS misc TEST BLOOD SUGAR 3 TO 4 TIMES A  each 3   • Aspirin Adult Low Strength 81 MG EC tablet TAKE ONE TABLET BY MOUTH DAILY 90 tablet 2   • atorvastatin (LIPITOR) 40 MG tablet TAKE ONE TABLET BY MOUTH DAILY 90 tablet 3   • Blood Glucose Monitoring Suppl (ACCU-CHEK IFEANYI) device Test blood sugar tid 1 each 0   • digoxin (LANOXIN) 125 MCG tablet TAKE ONE TABLET BY MOUTH DAILY 90 tablet 3   • diphenhydrAMINE (BENADRYL) 25 MG tablet Take 50 mg by mouth 2 (Two) Times a Day.     • enoxaparin (Lovenox) 80 MG/0.8ML solution syringe Inject 0.9 mL under the skin into the appropriate area as directed Every 12 (Twelve) Hours for 7 days. 22.4 mL 0   • ferrous gluconate (FERGON) 324 MG tablet Take 1 tablet by mouth Every Other Day for 30 days. 15 tablet 0   • furosemide (LASIX) 40 MG tablet Take 1 tablet by mouth Daily. 30 tablet 0   • glucose blood test strip TEST BLOOD SUGAR 3 TO 4 TIMES A  each 3   • guaiFENesin (MUCINEX) 600 MG 12 hr tablet Take  by mouth every 12 (twelve) hours.     • Insulin Disposable Pump (V-Go 40) kit USE AS DIRECTED THREE TIMES A DAY 30 each 11   •  insulin lispro (humaLOG) 100 UNIT/ML injection USE AS DIRECTED WITH V-GO PUMP 30 mL 6   • Insulin Pen Needle (Pen Needles) 32G X 4 MM misc USE AS DIRECTED 100 each 3   • magnesium oxide (MAG-OX) 400 MG tablet Take 400 mg by mouth 2 (Two) Times a Day.     • metoprolol succinate XL (TOPROL-XL) 25 MG 24 hr tablet TAKE ONE TABLET BY MOUTH DAILY 90 tablet 3   • mupirocin (BACTROBAN) 2 % ointment APPLY TO AFFECTED AREA(S) THREE TIMES A DAY 22 each 0   • Omega-3 Fatty Acids (FISH OIL) 1000 MG capsule capsule Take 4,000 mg by mouth Daily With Breakfast.     • pantoprazole (PROTONIX) 40 MG EC tablet Take 1 tablet by mouth Daily for 30 days. 30 tablet 0   • SAFETY LANCETS 21G misc Test blood sugar twice a day 200 each 3   • sodium bicarbonate 650 MG tablet Take 1,950 mg by mouth 2 (Two) Times a Day.     • Tresiba FlexTouch 100 UNIT/ML solution pen-injector injection INJECT 65 UNITS UNDER THE SKIN DAILY 24 pen 3   • warfarin (COUMADIN) 5 MG tablet TAKE 1 AND 1/2 TABLET BY MOUTH DAILY ON MONDAY, WEDNESDAY, AND FRIDAY. TAKE ONE TABLET BY MOUTH DAILY ON ALL OTHER DAYS OF THE WEEK 110 tablet 0     Allergies   Allergen Reactions   • Penicillins    • Percocet  [Oxycodone-Acetaminophen]    • Other Other (See Comments)     Grass, ragweed       Objective    Objective     Vital Signs  Temp:  [97.1 °F (36.2 °C)-98.7 °F (37.1 °C)] 97.8 °F (36.6 °C)  Heart Rate:  [68-83] 76  Resp:  [14-20] 18  BP: (111-139)/(58-71) 128/59  SpO2:  [93 %-100 %] 95 %  on   ;   Device (Oxygen Therapy): room air  Body mass index is 24.41 kg/m².    Physical Exam  Vitals and nursing note reviewed.   Constitutional:       General: He is not in acute distress.     Appearance: He is well-developed. He is ill-appearing. He is not toxic-appearing.   HENT:      Head: Normocephalic and atraumatic.      Mouth/Throat:      Mouth: Mucous membranes are moist.   Eyes:      Extraocular Movements: Extraocular movements intact.      Comments: Pallor   Neck:      Trachea: No  tracheal deviation.   Cardiovascular:      Rate and Rhythm: Normal rate and regular rhythm.      Comments: Mechanical valve present  Pulmonary:      Effort: Pulmonary effort is normal. No respiratory distress.      Breath sounds: Rales (right) present.   Abdominal:      General: Bowel sounds are normal. There is no distension.      Palpations: Abdomen is soft. There is no mass.      Tenderness: There is no abdominal tenderness. There is no guarding or rebound.   Musculoskeletal:         General: Normal range of motion.      Cervical back: Normal range of motion.   Skin:     General: Skin is warm and dry.      Coloration: Skin is pale.   Neurological:      General: No focal deficit present.      Mental Status: He is alert and oriented to person, place, and time.      Comments: Sleepy but awakens easily   Psychiatric:         Mood and Affect: Mood normal.         Behavior: Behavior normal.         Results Review:  I reviewed the patient's new clinical results.  I reviewed the patient's new imaging results and agree with the interpretation.  I reviewed the patient's other test results and agree with the interpretation  I personally viewed and interpreted the patient's EKG/Telemetry data  Discussed with ED provider.    Lab Results (last 24 hours)     Procedure Component Value Units Date/Time    CBC (No Diff) [[id number]]  (Abnormal) Collected: 11/01/21 0941    Specimen: Blood Updated: 11/01/21 1042     WBC 6.29 10*3/mm3      RBC 4.13 10*6/mm3      Hemoglobin 8.8 g/dL      Hematocrit 30.2 %      MCV 73.1 fL      MCH 21.3 pg      MCHC 29.1 g/dL      RDW 17.7 %      RDW-SD 46.4 fl      Platelets 186 10*3/mm3     Protime-INR [[id number]]  (Abnormal) Collected: 11/01/21 0941    Specimen: Blood Updated: 11/01/21 1048     Protime 20.5 Seconds      INR 1.78    Basic Metabolic Panel [[id number]]  (Abnormal) Collected: 11/01/21 0941    Specimen: Blood Updated: 11/01/21 1106     Glucose 105 mg/dL      BUN 40 mg/dL      Creatinine  1.60 mg/dL      Sodium 141 mmol/L      Potassium 4.8 mmol/L      Chloride 105 mmol/L      CO2 26.1 mmol/L      Calcium 8.6 mg/dL      eGFR Non African Amer 41 mL/min/1.73      BUN/Creatinine Ratio 25.0     Anion Gap 9.9 mmol/L     Narrative:      GFR Normal >60  Chronic Kidney Disease <60  Kidney Failure <15      CBC & Differential [[id number]]  (Abnormal) Collected: 11/02/21 0324    Specimen: Blood Updated: 11/02/21 0346    Narrative:      The following orders were created for panel order CBC & Differential.  Procedure                               Abnormality         Status                     ---------                               -----------         ------                     CBC Auto Differential[[id number]]        Abnormal            Final result                 Please view results for these tests on the individual orders.    Comprehensive Metabolic Panel [[id number]]  (Abnormal) Collected: 11/02/21 0324    Specimen: Blood Updated: 11/02/21 0408     Glucose 44 mg/dL      BUN 40 mg/dL      Creatinine 1.53 mg/dL      Sodium 144 mmol/L      Potassium 4.7 mmol/L      Chloride 111 mmol/L      CO2 23.8 mmol/L      Calcium 8.2 mg/dL      Total Protein 5.9 g/dL      Albumin 3.30 g/dL      ALT (SGPT) 21 U/L      AST (SGOT) 23 U/L      Alkaline Phosphatase 92 U/L      Total Bilirubin 0.3 mg/dL      eGFR Non African Amer 44 mL/min/1.73      Globulin 2.6 gm/dL      A/G Ratio 1.3 g/dL      BUN/Creatinine Ratio 26.1     Anion Gap 9.2 mmol/L     Narrative:      GFR Normal >60  Chronic Kidney Disease <60  Kidney Failure <15      aPTT [[id number]]  (Abnormal) Collected: 11/02/21 0324    Specimen: Blood Updated: 11/02/21 0452     PTT 70.3 seconds     Protime-INR [[id number]]  (Abnormal) Collected: 11/02/21 0324    Specimen: Blood Updated: 11/02/21 0452     Protime 23.7 Seconds      INR 2.14    CBC Auto Differential [[id number]]  (Abnormal) Collected: 11/02/21 0324    Specimen: Blood Updated: 11/02/21 0346     WBC 5.78 10*3/mm3       RBC 3.41 10*6/mm3      Hemoglobin 7.2 g/dL      Hematocrit 24.6 %      MCV 72.1 fL      MCH 21.1 pg      MCHC 29.3 g/dL      RDW 17.8 %      RDW-SD 46.2 fl      Platelets 151 10*3/mm3      Neutrophil % 68.7 %      Lymphocyte % 18.3 %      Monocyte % 7.3 %      Eosinophil % 4.3 %      Basophil % 1.2 %      Neutrophils, Absolute 3.97 10*3/mm3      Lymphocytes, Absolute 1.06 10*3/mm3      Monocytes, Absolute 0.42 10*3/mm3      Eosinophils, Absolute 0.25 10*3/mm3      Basophils, Absolute 0.07 10*3/mm3     Urinalysis With Microscopic If Indicated (No Culture) - Urine, Clean Catch [[id number]]  (Abnormal) Collected: 11/02/21 0334    Specimen: Urine, Clean Catch Updated: 11/02/21 0410     Color, UA Yellow     Appearance, UA Clear     pH, UA 6.5     Specific Gravity, UA 1.016     Glucose, UA Negative     Ketones, UA Negative     Bilirubin, UA Negative     Blood, UA Negative     Protein, UA Negative     Leuk Esterase, UA Moderate (2+)     Nitrite, UA Negative     Urobilinogen, UA 0.2 E.U./dL    Urinalysis, Microscopic Only - Urine, Clean Catch [[id number]]  (Abnormal) Collected: 11/02/21 0334    Specimen: Urine, Clean Catch Updated: 11/02/21 0503     RBC, UA 0-2 /HPF      WBC, UA 21-30 /HPF      Bacteria, UA 4+ /HPF      Squamous Epithelial Cells, UA 0-2 /HPF      Yeast, UA Moderate/2+ Budding Yeast /HPF      Hyaline Casts, UA 0-2 /LPF      Methodology Manual Light Microscopy    Hemoglobin & Hematocrit, Blood [[id number]]  (Abnormal) Collected: 11/02/21 0455    Specimen: Blood Updated: 11/02/21 0510     Hemoglobin 7.4 g/dL      Hematocrit 24.8 %     POC Glucose Once [[id number]]  (Normal) Collected: 11/02/21 0457    Specimen: Blood Updated: 11/02/21 0458     Glucose 124 mg/dL      Comment: Meter: [id number] : [id number] Brian Velez RN       COVID PRE-OP / PRE-PROCEDURE SCREENING ORDER (NO ISOLATION) - Swab, Nasopharynx [[id number]]  (Normal) Collected: 11/02/21 0526    Specimen: Swab from Nasopharynx Updated: 11/02/21 0619     Narrative:      The following orders were created for panel order COVID PRE-OP / PRE-PROCEDURE SCREENING ORDER (NO ISOLATION) - Swab, Nasopharynx.  Procedure                               Abnormality         Status                     ---------                               -----------         ------                     COVID-19,[facility]-HOUSE...[[id number]]  Normal              Final result                 Please view results for these tests on the individual orders.    COVID-19,[facility] CEPHEID/PORFIRIO NP SWAB IN TRANSPORT MEDIA 8-12 HR TAT - Swab, Nasopharynx [[id number]]  (Normal) Collected: 11/02/21 0526    Specimen: Swab from Nasopharynx Updated: 11/02/21 0619     COVID19 Not Detected    Narrative:      Fact sheet for providers: https://www.fda.gov/media/370907/download    Fact sheet for patients: https://www.fda.gov/media/997740/download    Test performed by PCR.    POCT Occult Blood Stool [[id number]]  (Abnormal) Collected: 11/02/21 0534    Specimen: Stool from Per Rectum Updated: 11/02/21 0535     Fecal Occult Blood Positive     Lot Number [id number]     Expiration Date 04/30/2022     Positive Control Positive     Negative Control Negative    POC Glucose Once [[id number]]  (Normal) Collected: 11/02/21 0610    Specimen: Blood Updated: 11/02/21 0612     Glucose 104 mg/dL      Comment: Meter: [id number] : [id number] Jey Dowell RN           Glucose   Date/Time Value Ref Range Status   11/02/2021 0610 104 70 - 130 mg/dL Final     Comment:     Meter: [id number] : [id number] Jey Dowell RN   11/02/2021 0457 124 70 - 130 mg/dL Final     Comment:     Meter: [id number] : [id number] Brian Velez RN   10/30/2021 1615 162 (H) 70 - 130 mg/dL Final     Comment:     Meter: [id number] : [id number] Isaias GONZALEZ [facility]   10/30/2021 1105 204 (H) 70 - 130 mg/dL Final     Comment:     Meter: [id number] : [id number] Herbie ALEJO       Imaging Results (Last 24 Hours)     Procedure Component Value  Units Date/Time    CT Abdomen Pelvis With Contrast [[id number]] Collected: 11/02/21 0528     Updated: 11/02/21 0541    Narrative:      CT OF THE ABDOMEN AND PELVIS WITH CONTRAST     HISTORY: GI bleeding     COMPARISON: 10/25/2021 and other prior imaging     TECHNIQUE: Axial CT imaging was obtained through the abdomen and pelvis.  IV contrast was administered.     FINDINGS:  Large right pleural effusion is again noted. It appears slightly smaller  than on prior exam. A previously identified left pleural effusion has  almost completely resolved. The stomach and duodenum appear  unremarkable. There is cholelithiasis. Right kidney is absent. Suspected  tiny cyst are seen within the liver. Spleen and pancreas appear within  normal limits. There is no hydronephrosis. The patient has multiple tiny  low-attenuation lesions within the left kidney. Several of these measure  higher in density than simple cysts. Further assessment with renal  protocol CT or MRI is recommended. There is calcification of the aorta.  Urinary bladder is unremarkable. Prostate gland appears to be absent.  There is colonic diverticulosis. There is no evidence of diverticulitis.  Stool burden does appear mildly increased, which may reflect some  constipation. There is no evidence of mechanical bowel obstruction. The  appendix is normal. No acute osseous abnormalities are seen.       Impression:         1. No obvious etiology for the patient's GI bleeding is identified.  Patient does have colonic diverticulosis. There is no diverticulitis.  2. Large right pleural effusion. This may be slightly smaller than on  prior study. Previously identified left pleural effusion has almost  completely resolved.     Radiation dose reduction techniques were utilized, including automated  exposure control and exposure modulation based on body size.     This report was finalized on 11/2/2021 5:38 AM by Dr. Yue Frye M.D.             Results for orders placed  during the hospital encounter of 10/23/21    Adult transthoracic echo complete    Interpretation Summary  · Estimated left ventricular EF = 60% Left ventricular systolic function is normal.  · Left ventricular diastolic function was indeterminate.  · The right ventricular cavity is mild to moderately dilated.  · Left atrial volume is moderately increased.  · The right atrial cavity is moderately dilated.  · There is a bioprosthetic aortic valve present. The aortic valve peak and mean gradients are within defined limits.  · Moderate tricuspid valve regurgitation is present.  · Severe pulmonary hypertension is present. Calculated right ventricular systolic pressure from tricuspid regurgitation is 63.7 mmHg.      No orders to display        Assessment/Plan     Active Hospital Problems    Diagnosis  POA   • **Lower GI bleed [K92.2]  Yes   • Iron deficiency anemia [D50.9]  Yes   • Chronic anticoagulation [Z79.01]  Not Applicable   • CKD (chronic kidney disease) stage 3, GFR 30-59 ml/min (HCC) [N18.30]  Yes   • Kidney carcinoma (HCC) [C64.9]  Yes   • Hx of aortic valve replacement, mechanical [Z95.2] [Z95.2]  Not Applicable   • Uncontrolled type 2 diabetes mellitus with complication, with long-term current use of insulin (HCC) [E11.8, E11.65, Z79.4]  Not Applicable   • Atrial fibrillation (HCC) [I48.91]  Yes   • Hypertension [I10]  Yes      Resolved Hospital Problems   No resolved problems to display.       Mr. Sequeira is a 85 y.o. admitted with anemia and recurrent GI bleeding of unclear etiology.    Obscure gastrointestinal bleeding/acute on chronic blood loss anemia/iron deficiency anemia  Consisting of melena and BRBPR.  Vital signs stable but declining hemoglobin since admission.  CT abdomen pelvis with no obvious source of GIB. Endoscopy last admission without source. GI consulted  Tagged red blood cell scan ordered by GI.Hemoglobin 7.2 compared to 9 on 10/30.    2 units packed red blood cells ordered.Trend H&H  every 8 hours  INR 2.14 below goal of 3-3.5, holding coumadin.  Hold on heparin drip given active bleeding.  Consult cardiology given mechanical valve and atrial fibrillation    Chronic atrial fibrillation/ mechanical aortic valve/history of CVA   consult cardiology to follow   Valve functioning well on echo last admission  Target INR is 3-3.5 now  He would benefit from Lovenox bridge when INR drops below 2 if cleared by GI and bleeding stabilized.    Pleural effusion/congestive heart failure  Status post right thoracentesis 10/25 removing 1650 mL transudate, negative for malignancy.  Suspect to be due to CHF  Normal pro-Bunny, WBC normal, afebrile. BNP wnl    He denies chest pain or shortness of breath, not volume overloaded on exam    DM2 with hypoglycemia  Last A1c 5.2  SSI and avoid hypoglycemia.  Glucose 44 in the ER.  Started on D5 half-normal saline.  Hold basal insulin     CKD3/status post right nephrectomy secondary to renal cancer  Monitor renal function, creatinine close to baseline  Monitor renal function closely and consult Dr. Dominguez, nephrology as needed. Continue IVF after Tagged RBC.        I discussed the patient's findings and my recommendations with patient, family, nursing staff, ED provider and Dr Yin.    VTE Prophylaxis - SCDs .  Code Status - Full code.       LIZA Rizvi  [facility]  11/02/21  09:36 EDT

## 2021-11-02 NOTE — CONSULTS
[facility]  Initial Inpatient Consult Note    Referring Provider: Carmen Buckner    Reason for Consultation: GI bleed    Subjective     History of present illness:    85 y.o. male admitted last week with obscure overt GI bleeding melena and bright red blood per rectum.  Underwent EGD and colonoscopy with findings of small polyps removed with cold snare from the colon.  He was restarted on warfarin and sent home.  Discharge hemoglobin was 9.1, readmitted with black stool maroon stool and hemoglobin of 7.2.  It was recommended outpatient capsule endoscopy obviously there was not time for this to get done.  He underwent no imaging such as angiogram or a tagged red blood cell scan during last admission.  Denies abdominal pain.  No chest pain shortness of air presyncope or syncope.  Currently receiving 2 units of packed red blood cells    Past Medical History:  Past Medical History:   Diagnosis Date   • Abnormal electrocardiogram    • Allergic rhinitis    • Aortic valve insufficiency    • Ascending aortic aneurysm (HCC)    • Atrial fibrillation (HCC)    • Bacteremia    • Calcific aortic stenosis of bicuspid valve    • Cardiac arrest (HCC)    • Cardiomyopathy (HCC)    • Contact dermatitis due to poison ivy    • Diabetes mellitus (HCC)    • Elbow fracture    • Esophageal reflux    • GERD (gastroesophageal reflux disease)    • Head injury    • History of transfusion    • Hyperglycemia    • Hyperlipidemia    • Hypertension    • Kidney carcinoma (HCC)    • Leg swelling    • Localized swelling of both lower legs    • Nasal congestion    • Nasal drainage    • Nonischemic cardiomyopathy (HCC)    • Palpitations    • Pedal edema    • Pleural effusion on left    • Renal insufficiency syndrome    • Renal oncocytoma    • Seasonal allergic reaction    • Sinusitis    • Syncope    • Type 2 diabetes mellitus (HCC)     uncontrolled   • Vision changes    • Visual field defect      Past Surgical History:  Past  Surgical History:   Procedure Laterality Date   • AORTIC VALVE REPAIR/REPLACEMENT     • ASCENDING AORTIC ANEURYSM REPAIR W/ MECHANICAL AORTIC VALVE REPLACEMENT     • COLONOSCOPY N/A 10/28/2021    Procedure: COLONOSCOPY to cecum:  cold snare polyps,;  Surgeon: Dinesh Meza MD;  Location: [facility] ENDOSCOPY;  Service: Gastroenterology;  Laterality: N/A;  pre:  Iron deficiency anemia  post:  polyps, diverticulosis,    • ENDOSCOPY N/A 10/28/2021    Procedure: ESOPHAGOGASTRODUODENOSCOPY with biopsies;  Surgeon: Dinesh Meza MD;  Location: [facility] ENDOSCOPY;  Service: Gastroenterology;  Laterality: N/A;  pre:  Iron deficiency anemia  post:  duodenitis and gastritis   • EYE SURGERY  12/09/2020    cataract surgery    • NEPHRECTOMY     • OTHER SURGICAL HISTORY      elbow surgery   • PROSTATE SURGERY     • THORACENTESIS Left     diagnostic      Social History:   Social History     Tobacco Use   • Smoking status: Former Smoker   • Smokeless tobacco: Former User   • Tobacco comment: caffeine use   Substance Use Topics   • Alcohol use: Yes     Comment: occasional      Family History:  Family History   Problem Relation Age of Onset   • Cancer Mother         colon   • Cancer Brother         colon       Home Meds:  (Not in a hospital admission)    Current Meds:   dextrose, 25 g, Intravenous, Once  insulin lispro, 0-9 Units, Subcutaneous, 4x Daily With Meals & Nightly  pantoprazole, 40 mg, Intravenous, Q12H  sodium chloride, 10 mL, Intravenous, Q12H      Allergies:  Allergies   Allergen Reactions   • Penicillins    • Percocet  [Oxycodone-Acetaminophen]    • Other Other (See Comments)     Grass, ragweed     Review of Systems  There is weakness and fatigue all other systems reviewed and negative     Objective     Vital Signs  Temp:  [97.1 °F (36.2 °C)-98.7 °F (37.1 °C)] 97.4 °F (36.3 °C)  Heart Rate:  [68-83] 76  Resp:  [14-18] 16  BP: (111-139)/(58-68) 129/66  Physical Exam:  General Appearance:    Alert,  cooperative, in no acute distress   Head:    Normocephalic, without obvious abnormality, atraumatic   Eyes:          conjunctivae and sclerae normal, no   icterus   Throat:   no thrush, oral mucosa moist   Neck:   Supple, no adenopathy   Lungs:     Clear to auscultation bilaterally    Heart:    Regular rhythm and normal rate    Chest Wall:    No abnormalities observed   Abdomen:     Soft, nondistended, nontender; normal bowel sounds   Extremities:   no edema, no redness   Skin:   No bruising or rash   Psychiatric:  normal mood and insight     Results Review:   I reviewed the patient's new clinical results.    Results from last 7 days   Lab Units 11/02/21  0455 11/02/21 0324 11/01/21  0941 10/30/21  0920 10/30/21  0920   WBC 10*3/mm3  --  5.78 6.29  --  5.17  4.85   HEMOGLOBIN g/dL 7.4* 7.2* 8.8*   < > 9.3*  9.1*   HEMATOCRIT % 24.8* 24.6* 30.2*   < > 32.5*  31.8*   PLATELETS 10*3/mm3  --  151 186  --  161  162    < > = values in this interval not displayed.     Results from last 7 days   Lab Units 11/02/21  0324 11/01/21  0941 10/30/21  0602 10/28/21  1229 10/27/21  0704   SODIUM mmol/L 144 141 138   < > 141   POTASSIUM mmol/L 4.7 4.8 4.4   < > 4.3   CHLORIDE mmol/L 111* 105 103   < > 102   CO2 mmol/L 23.8 26.1 24.4   < > 28.7   BUN mg/dL 40* 40* 31*   < > 35*   CREATININE mg/dL 1.53* 1.60* 1.46*   < > 1.66*   CALCIUM mg/dL 8.2* 8.6 8.1*   < > 8.4*   BILIRUBIN mg/dL 0.3  --   --   --  0.8   ALK PHOS U/L 92  --   --   --  102   ALT (SGPT) U/L 21  --   --   --  15   AST (SGOT) U/L 23  --   --   --  18   GLUCOSE mg/dL 44* 105* 175*   < > 123*    < > = values in this interval not displayed.     Results from last 7 days   Lab Units 11/02/21  0324 11/01/21  0941 11/01/21  0000   INR  2.14* 1.78* 1.80     No results found for: LIPASE    Radiology:  CT Abdomen Pelvis With Contrast   Final Result       1. No obvious etiology for the patient's GI bleeding is identified.   Patient does have colonic diverticulosis. There  is no diverticulitis.   2. Large right pleural effusion. This may be slightly smaller than on   prior study. Previously identified left pleural effusion has almost   completely resolved.       Radiation dose reduction techniques were utilized, including automated   exposure control and exposure modulation based on body size.       This report was finalized on 11/2/2021 5:38 AM by Dr. Yue Frye M.D.          NM GI Blood Loss    (Results Pending)       Assessment/Plan   Patient Active Problem List   Diagnosis   • Atrial fibrillation (HCC)   • Hypertension   • Atopic rhinitis   • Gastroesophageal reflux disease   • Hyperlipidemia   • Uncontrolled type 2 diabetes mellitus with complication, with long-term current use of insulin (HCC)   • Renal insufficiency   • Low testosterone   • Medicare annual wellness visit, subsequent   • Hx of aortic valve replacement, mechanical [Z95.2]   • Screening for iron deficiency anemia   • Stroke-like symptom   • Kidney carcinoma (HCC)   • CKD (chronic kidney disease) stage 3, GFR 30-59 ml/min (HCC)   • BYRON (acute kidney injury) (HCC)   • Acute cerebral infarction (HCC)   • 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 Obscure overt GI bleeding in the form of melena and bright blood per rectum  2. Acute drop in hemoglobin 2 g over the last 48 to 72 hours with Lovenox and Coumadin usage, INR greater than 2.0    Plan:  · Transfuse 2 units of packed red blood cell  · Tagged red blood cell scan today  · Sources of bleeding could be biopsies from the stomach versus cold snare polypectomy, but both unlikely, more than likely he has a source of GI bleeding that is yet to be discovered in the small intestine  · If tagged red blood cell scan is positive to the small intestine we will involve  interventional radiologist for angiography  · If tagged red blood cell scan is negative and his bleeding ceases I would recommend semiurgent outpatient capsule endoscopy probably near the end of this week  · Serial hemoglobin  · Clear liquid diet okay  · Hold Coumadin and Lovenox at this time      I discussed the patients findings and my recommendations with patient and nursing staff.    Dinesh Arcos MD

## 2021-11-02 NOTE — PROGRESS NOTES
"[facility] PULMONARY CARE         Dr Castillo Sayied   LOS: 0 days   Patient Care Team:  Rubin Hinkle APRN as PCP - General (Family Medicine)  Audra Vazquez RPH as Pharmacist  Vasquez Niño PharmD as Pharmacist (Pharmacy)    Chief Complaint: Lower GI bleed with bilateral pleural effusion status post thoracentesis transudate of effusion negative for malignancy history of aortic valve replacement A. fib    Interval History: Patient well-known to me from last admission.  Was just recently discharged 2 days ago and returns today with acute bright red blood per rectum/melena.  Apparently had a nuclear scan done as outpatient by GI.  We were reconsulted for right pleural effusion.  Currently patient on room air denies any chest pain or shortness of breath.  His main complaint is abdominal pain right lower quadrant with bright red blood per rectum.  Patient and wife reports generalized weakness and there is a drop of hemoglobin from 9.3-7.2.  Stool was reported to be grossly bloody in the emergency room.    REVIEW OF SYSTEMS:   CARDIOVASCULAR: No chest pain, chest pressure or chest discomfort. No palpitations or edema.   RESPIRATORY: No shortness of breath, cough or sputum.   GASTROINTESTINAL: Abdominal pain right lower quadrant with bright red blood per rectum      Ventilator/Non-Invasive Ventilation Settings (From admission, onward)            None      On room air      Vital Signs  Temp:  [97.1 °F (36.2 °C)-98.7 °F (37.1 °C)] 97.8 °F (36.6 °C)  Heart Rate:  [68-83] 76  Resp:  [14-20] 18  BP: (111-139)/(58-71) 128/59    Intake/Output Summary (Last 24 hours) at 11/2/2021 1724  Last data filed at 11/2/2021 1657  Gross per 24 hour   Intake 573.33 ml   Output 850 ml   Net -276.67 ml     Flowsheet Rows      First Filed Value   Admission Height 182.9 cm (72\") Documented at 11/02/2021 0344   Admission Weight 81.6 kg (180 lb) Documented at 11/02/2021 0344          Physical Exam:  Patient is examined using the personal " protective equipment as per guidelines from infection control for this particular patient as enacted.  Hand hygiene was performed before and after patient interaction.   General Appearance:    Alert, cooperative, in no acute distress.  Following simple commands  Neck midline trachea, no thyromegaly   Lungs:    Diminished breath sounds right side with occasional crackles right midlung.  No labored breathing    Heart:    Regular rhythm and normal rate, normal S1 and S2, no            murmur, no gallop, no rub, no click   Chest Wall:    No abnormalities observed   Abdomen:    Tenderness more localized to the right lower quadrant with rebound and guarding.  Hypoactive bowel sounds   Extremities:   Moves all extremities well, no edema, no cyanosis, no             redness  CNS no focal neurological deficits normal sensory exam  Skin no rashes no nodules  Musculoskeletal no cyanosis no clubbing normal range of motion     Results Review:        Results from last 7 days   Lab Units 11/02/21  0324 11/01/21  0941 11/01/21  0941 10/30/21  0602 10/30/21  0602   SODIUM mmol/L 144  --  141  --  138   POTASSIUM mmol/L 4.7  --  4.8  --  4.4   CHLORIDE mmol/L 111*  --  105  --  103   CO2 mmol/L 23.8  --  26.1  --  24.4   BUN mg/dL 40*  --  40*  --  31*   CREATININE mg/dL 1.53*  --  1.60*  --  1.46*   GLUCOSE mg/dL 44*   < > 105*   < > 175*   CALCIUM mg/dL 8.2*  --  8.6  --  8.1*    < > = values in this interval not displayed.         Results from last 7 days   Lab Units 11/02/21  0455 11/02/21  0324 11/01/21  0941 10/30/21  0920 10/30/21  0920   WBC 10*3/mm3  --  5.78 6.29  --  5.17  4.85   HEMOGLOBIN g/dL 7.4* 7.2* 8.8*   < > 9.3*  9.1*   HEMATOCRIT % 24.8* 24.6* 30.2*   < > 32.5*  31.8*   PLATELETS 10*3/mm3  --  151 186  --  161  162    < > = values in this interval not displayed.     Results from last 7 days   Lab Units 11/02/21  0324 11/01/21 0941 11/01/21  0000 10/30/21  0920 10/29/21  0631 10/28/21  2008 10/28/21  1229    INR  2.14* 1.78* 1.80   < > 1.27*  --    < >   APTT seconds 70.3*  --   --   --  115.6* 71.6*  --     < > = values in this interval not displayed.                       I reviewed the patient's new clinical results.  I personally viewed and interpreted the patient's chest x-ray.        Medication Review:   atorvastatin, 40 mg, Oral, Daily  dextrose, 25 g, Intravenous, Once  digoxin, 125 mcg, Oral, Daily  ferrous sulfate, 325 mg, Oral, Daily With Breakfast  guaiFENesin, 600 mg, Oral, Q12H  insulin lispro, 0-9 Units, Subcutaneous, 4x Daily With Meals & Nightly  magnesium oxide, 400 mg, Oral, BID  metoprolol succinate XL, 25 mg, Oral, Daily  pantoprazole, 40 mg, Intravenous, Q12H  sodium bicarbonate, 1,950 mg, Oral, BID  sodium chloride, 10 mL, Intravenous, Q12H        dextrose 5 % and sodium chloride 0.45 %, 125 mL/hr, Last Rate: 125 mL/hr (11/02/21 1540)        ASSESSMENT:   Bilateral pleural effusion right greater than left  Acute GI bleed suspect lower  History of kidney cancer  History of aortic valve replacement mechanical  Diabetes mellitus  Severe pulmonary hypertension on echo  A. fib  Hypertension  Left-sided weakness  Anemia acute blood loss    PLAN:  At this point we have an elderly gentleman well-known to me with bilateral pleural effusion transudative from previous thoracentesis.  Currently admitted with acute GI bleed with nuclear scan showing right lower quadrant acute GI bleeding.  As far as his pleural effusion is concerned it looks stable but there is still significant amount effusion that needs to be tapped again.  Currently he is on room air and on no acute respiratory distress as such.  There is no urgency for thoracentesis at this time.  I will let his acute GI bleeding issues resolve and at some point when he is off anticoagulation would recommend another thoracentesis and continuing diuretics to see if no significant recurrent effusion.  We will follow along closely      Jonathan Sayied,  MD  11/02/21  17:24 EDT

## 2021-11-02 NOTE — OUTREACH NOTE
CHF Week 2 Survey      Responses   [facility] patient discharged from? [place]   Does the patient have one of the following disease processes/diagnoses(primary or secondary)? CHF   Week 2 attempt successful? No   Revoke Readmitted          Clarice Aguirre RN

## 2021-11-02 NOTE — CASE COMMUNICATION
SOC 11/1  DIAG: RIGHT PLURAL EFFUSION, LEFT SIDED WEAKNESS  HX: HTN, DM TYPE 2 ON INSULIN PUMP, AFIB, CKD3  SN 2WK2, 1WK2  PT ORDERED AS WELL     PATIENT HAD INCREASED SOA, FATIGUE, WENT TO HOSP  DIAG WITH PLEURAL EFFUSION, LEFT SIDE WEAKNESS.   WIFE WAS CONCERNED HE HAD ANOTHER STROKE.   UP IN HOME NO AD, USES WALKER WHEN HE LEAVES HOME   NO PAIN   SEVERAL NEW MEDS,   SHE IS DOING LOVENOX INJ TWICE DAILY. HAS HER OWN INR MACHINE.

## 2021-11-02 NOTE — PLAN OF CARE
Goal Outcome Evaluation:              Outcome Summary: Patient not available for evaluation. RN reports concern for difficulty with thin liquids. SLP to follow for bedside swallow eval next date as patient appropriate.

## 2021-11-02 NOTE — ED NOTES
"Pt said he first started seeing blood in his stool on Sunday and this progressively \"got worse\" as more dark red blood began to appear. Yesterday pt started having weakness, SOB. Pt denies dizziness, CP.      Rosie Torres, RN  11/02/21 7129    "

## 2021-11-02 NOTE — PROGRESS NOTES
"   LOS: 0 days   Patient Care Team:  Rubin Hinkle APRN as PCP - General (Family Medicine)  Audra Vazquez RODDY as Pharmacist  Vasquez Niño PharmD as Pharmacist (Pharmacy)    Chief Complaint: GIB     I did not write a new consult note as he was just here last week.     Interval History: He was sent home with warfarin and enoxaparin. Three days ago he started having dark stools and overnight had dark stools and red stools.  His Hgb is 7.  He denies CP/SOA. He's on room air.       Objective   Vital Signs  Temp:  [97.1 °F (36.2 °C)-98.3 °F (36.8 °C)] 97.8 °F (36.6 °C)  Heart Rate:  [71-83] 76  Resp:  [14-20] 18  BP: (111-139)/(58-71) 128/59    Intake/Output Summary (Last 24 hours) at 11/2/2021 1215  Last data filed at 11/2/2021 1128  Gross per 24 hour   Intake 255 ml   Output 225 ml   Net 30 ml       Last Weight and Admission Weight        11/02/21  0344   Weight: 81.6 kg (180 lb)     Flowsheet Rows      First Filed Value   Admission Height 182.9 cm (72\") Documented at 11/02/2021 0344   Admission Weight 81.6 kg (180 lb) Documented at 11/02/2021 0344                  Physical Exam  Constitutional:       General: He is not in acute distress.  HENT:      Head: Normocephalic.      Mouth/Throat:      Pharynx: Oropharynx is clear.   Eyes:      Conjunctiva/sclera: Conjunctivae normal.   Cardiovascular:      Rate and Rhythm: Rhythm irregular.      Pulses: Normal pulses.      Heart sounds: Murmur heard.    Systolic murmur is present with a grade of 2/6.       Comments: Mechanical S2  Abdominal:      Palpations: Abdomen is soft.      Tenderness: There is no abdominal tenderness.   Musculoskeletal:      Right lower leg: No edema.      Left lower leg: No edema.   Skin:     Coloration: Skin is pale.   Neurological:      General: No focal deficit present.      Mental Status: He is alert.      Comments: Appears forgetful   Psychiatric:         Mood and Affect: Mood normal.         Results Review:      Results from last 7 days   Lab " Units 11/02/21  0324 11/01/21 0941 11/01/21  0941 10/30/21  0602 10/30/21  0602   SODIUM mmol/L 144  --  141  --  138   POTASSIUM mmol/L 4.7  --  4.8  --  4.4   CHLORIDE mmol/L 111*  --  105  --  103   CO2 mmol/L 23.8  --  26.1  --  24.4   BUN mg/dL 40*  --  40*  --  31*   CREATININE mg/dL 1.53*  --  1.60*  --  1.46*   GLUCOSE mg/dL 44*   < > 105*   < > 175*   CALCIUM mg/dL 8.2*  --  8.6  --  8.1*    < > = values in this interval not displayed.         Results from last 7 days   Lab Units 11/02/21  0455 11/02/21  0324 11/01/21  0941 10/30/21  0920 10/30/21  0920   WBC 10*3/mm3  --  5.78 6.29  --  5.17  4.85   HEMOGLOBIN g/dL 7.4* 7.2* 8.8*   < > 9.3*  9.1*   HEMATOCRIT % 24.8* 24.6* 30.2*   < > 32.5*  31.8*   PLATELETS 10*3/mm3  --  151 186  --  161  162    < > = values in this interval not displayed.     Results from last 7 days   Lab Units 11/02/21 0324 11/01/21 0941 11/01/21  0000 10/30/21  0920 10/29/21  0631 10/28/21  2008 10/28/21  1229   INR  2.14* 1.78* 1.80   < > 1.27*  --    < >   APTT seconds 70.3*  --   --   --  115.6* 71.6*  --     < > = values in this interval not displayed.                   I reviewed the patient's new clinical results.  I personally viewed and interpreted the patient's EKG/Telemetry data        Medication Review:   dextrose, 25 g, Intravenous, Once  insulin lispro, 0-9 Units, Subcutaneous, 4x Daily With Meals & Nightly  pantoprazole, 40 mg, Intravenous, Q12H  sodium chloride, 10 mL, Intravenous, Q12H        dextrose 5 % and sodium chloride 0.45 %, 125 mL/hr, Last Rate: Stopped (11/02/21 0640)        Assessment/Plan     1. Acute lower GIB  2. Atrial fibrillation  3. Mechanical AVR  4. DM  5. CKD3  6. HTN    Agree with RBCs.  Give furosemide between units, recheck CXR s/p thoracentesis. He sounds clear.     Hold AC/BB for now.  Tagged RBC scan pending.     Will follow.     Bear Cárdenas MD  11/02/21  12:15 EDT

## 2021-11-02 NOTE — HOME HEALTH
PATIENT HAD INCREASED SOA, FATIGUE, WENT TO HOSP  DIAG WITH PLEURAL EFFUSION, LEFT SIDE WEAKNESS.   WIFE WAS CONCERNED HE HAD ANOTHER STROKE.   UP IN HOME NO AD, USES WALKER WHEN HE LEAVES HOME   NO PAIN   SEVERAL NEW MEDS,   SHE IS DOING LOVENOX INJ TWICE DAILY. HAS HER OWN INR MACHINE.

## 2021-11-02 NOTE — ED PROVIDER NOTES
MD ATTESTATION NOTE    The AILIN and I have discussed this patient's history, physical exam, and treatment plan.  I have reviewed the documentation and personally had a face to face interaction with the patient. I affirm the documentation and agree with the treatment and plan.  The attached note describes my personal findings.      Francisco Sequeira is a 85 y.o. male who presents to the ED c/o dark bloody stools that started this evening. Patient was recently admitted for GI bleed and had EGD and colonoscopy. States he feels weak. No chest pain fever chills shortness of breath. Patient is on Coumadin.      On exam:  No acute distress, normocephalic atraumatic, supple nontender, regular rate and rhythm, clear to auscultation bilaterally, soft nontender nondistended, moving all extremities well    Labs  Recent Results (from the past 24 hour(s))   CBC (No Diff)    Collection Time: 11/01/21  9:41 AM    Specimen: Blood   Result Value Ref Range    WBC 6.29 3.40 - 10.80 10*3/mm3    RBC 4.13 (L) 4.14 - 5.80 10*6/mm3    Hemoglobin 8.8 (L) 13.0 - 17.7 g/dL    Hematocrit 30.2 (L) 37.5 - 51.0 %    MCV 73.1 (L) 79.0 - 97.0 fL    MCH 21.3 (L) 26.6 - 33.0 pg    MCHC 29.1 (L) 31.5 - 35.7 g/dL    RDW 17.7 (H) 12.3 - 15.4 %    RDW-SD 46.4 37.0 - 54.0 fl    Platelets 186 140 - 450 10*3/mm3   Protime-INR    Collection Time: 11/01/21  9:41 AM    Specimen: Blood   Result Value Ref Range    Protime 20.5 (H) 11.7 - 14.2 Seconds    INR 1.78 (H) 0.90 - 1.10   Basic Metabolic Panel    Collection Time: 11/01/21  9:41 AM    Specimen: Blood   Result Value Ref Range    Glucose 105 (H) 65 - 99 mg/dL    BUN 40 (H) 8 - 23 mg/dL    Creatinine 1.60 (H) 0.76 - 1.27 mg/dL    Sodium 141 136 - 145 mmol/L    Potassium 4.8 3.5 - 5.2 mmol/L    Chloride 105 98 - 107 mmol/L    CO2 26.1 22.0 - 29.0 mmol/L    Calcium 8.6 8.6 - 10.5 mg/dL    eGFR Non African Amer 41 (L) >60 mL/min/1.73    BUN/Creatinine Ratio 25.0 7.0 - 25.0    Anion Gap 9.9 5.0 - 15.0 mmol/L    Comprehensive Metabolic Panel    Collection Time: 11/02/21  3:24 AM    Specimen: Blood   Result Value Ref Range    Glucose 44 (C) 65 - 99 mg/dL    BUN 40 (H) 8 - 23 mg/dL    Creatinine 1.53 (H) 0.76 - 1.27 mg/dL    Sodium 144 136 - 145 mmol/L    Potassium 4.7 3.5 - 5.2 mmol/L    Chloride 111 (H) 98 - 107 mmol/L    CO2 23.8 22.0 - 29.0 mmol/L    Calcium 8.2 (L) 8.6 - 10.5 mg/dL    Total Protein 5.9 (L) 6.0 - 8.5 g/dL    Albumin 3.30 (L) 3.50 - 5.20 g/dL    ALT (SGPT) 21 1 - 41 U/L    AST (SGOT) 23 1 - 40 U/L    Alkaline Phosphatase 92 39 - 117 U/L    Total Bilirubin 0.3 0.0 - 1.2 mg/dL    eGFR Non African Amer 44 (L) >60 mL/min/1.73    Globulin 2.6 gm/dL    A/G Ratio 1.3 g/dL    BUN/Creatinine Ratio 26.1 (H) 7.0 - 25.0    Anion Gap 9.2 5.0 - 15.0 mmol/L   aPTT    Collection Time: 11/02/21  3:24 AM    Specimen: Blood   Result Value Ref Range    PTT 70.3 (H) 22.7 - 35.4 seconds   Protime-INR    Collection Time: 11/02/21  3:24 AM    Specimen: Blood   Result Value Ref Range    Protime 23.7 (H) 11.7 - 14.2 Seconds    INR 2.14 (H) 0.90 - 1.10   Type & Screen    Collection Time: 11/02/21  3:24 AM    Specimen: Blood   Result Value Ref Range    ABO Type O     RH type Positive     Antibody Screen Negative     T&S Expiration Date 11/5/2021 11:59:59 PM    CBC Auto Differential    Collection Time: 11/02/21  3:24 AM    Specimen: Blood   Result Value Ref Range    WBC 5.78 3.40 - 10.80 10*3/mm3    RBC 3.41 (L) 4.14 - 5.80 10*6/mm3    Hemoglobin 7.2 (L) 13.0 - 17.7 g/dL    Hematocrit 24.6 (L) 37.5 - 51.0 %    MCV 72.1 (L) 79.0 - 97.0 fL    MCH 21.1 (L) 26.6 - 33.0 pg    MCHC 29.3 (L) 31.5 - 35.7 g/dL    RDW 17.8 (H) 12.3 - 15.4 %    RDW-SD 46.2 37.0 - 54.0 fl    Platelets 151 140 - 450 10*3/mm3    Neutrophil % 68.7 42.7 - 76.0 %    Lymphocyte % 18.3 (L) 19.6 - 45.3 %    Monocyte % 7.3 5.0 - 12.0 %    Eosinophil % 4.3 0.3 - 6.2 %    Basophil % 1.2 0.0 - 1.5 %    Neutrophils, Absolute 3.97 1.70 - 7.00 10*3/mm3    Lymphocytes,  Absolute 1.06 0.70 - 3.10 10*3/mm3    Monocytes, Absolute 0.42 0.10 - 0.90 10*3/mm3    Eosinophils, Absolute 0.25 0.00 - 0.40 10*3/mm3    Basophils, Absolute 0.07 0.00 - 0.20 10*3/mm3   Urinalysis With Microscopic If Indicated (No Culture) - Urine, Clean Catch    Collection Time: 11/02/21  3:34 AM    Specimen: Urine, Clean Catch   Result Value Ref Range    Color, UA Yellow Yellow, Straw    Appearance, UA Clear Clear    pH, UA 6.5 5.0 - 8.0    Specific Gravity, UA 1.016 1.005 - 1.030    Glucose, UA Negative Negative    Ketones, UA Negative Negative    Bilirubin, UA Negative Negative    Blood, UA Negative Negative    Protein, UA Negative Negative    Leuk Esterase, UA Moderate (2+) (A) Negative    Nitrite, UA Negative Negative    Urobilinogen, UA 0.2 E.U./dL 0.2 - 1.0 E.U./dL   Urinalysis, Microscopic Only - Urine, Clean Catch    Collection Time: 11/02/21  3:34 AM    Specimen: Urine, Clean Catch   Result Value Ref Range    RBC, UA 0-2 None Seen, 0-2 /HPF    WBC, UA 21-30 (A) None Seen, 0-2 /HPF    Bacteria, UA 4+ (A) None Seen /HPF    Squamous Epithelial Cells, UA 0-2 None Seen, 0-2 /HPF    Yeast, UA Moderate/2+ Budding Yeast None Seen /HPF    Hyaline Casts, UA 0-2 None Seen /LPF    Methodology Manual Light Microscopy    Prepare RBC, 2 Units    Collection Time: 11/02/21  4:52 AM   Result Value Ref Range    Product Code A2498B63     Unit Number [id number]     UNIT  ABO O     UNIT  RH POS     Crossmatch Interpretation Compatible     Dispense Status XM     Blood Expiration Date 202112062359     Blood Type Barcode 5100     Product Code I5275X63     Unit Number [id number]     UNIT  ABO O     UNIT  RH POS     Crossmatch Interpretation Compatible     Dispense Status XM     Blood Expiration Date 202112062359     Blood Type Barcode 5100    Hemoglobin & Hematocrit, Blood    Collection Time: 11/02/21  4:55 AM    Specimen: Blood   Result Value Ref Range    Hemoglobin 7.4 (L) 13.0 - 17.7 g/dL    Hematocrit 24.8 (L) 37.5 -  51.0 %   POC Glucose Once    Collection Time: 11/02/21  4:57 AM    Specimen: Blood   Result Value Ref Range    Glucose 124 70 - 130 mg/dL   POCT Occult Blood Stool    Collection Time: 11/02/21  5:34 AM    Specimen: Per Rectum; Stool   Result Value Ref Range    Fecal Occult Blood Positive (A) Negative    Lot Number [id number]     Expiration Date 04/30/2022     Positive Control Positive Positive    Negative Control Negative Negative       Radiology  CT Abdomen Pelvis With Contrast    Result Date: 11/2/2021  CT OF THE ABDOMEN AND PELVIS WITH CONTRAST  HISTORY: GI bleeding  COMPARISON: 10/25/2021 and other prior imaging  TECHNIQUE: Axial CT imaging was obtained through the abdomen and pelvis. IV contrast was administered.  FINDINGS: Large right pleural effusion is again noted. It appears slightly smaller than on prior exam. A previously identified left pleural effusion has almost completely resolved. The stomach and duodenum appear unremarkable. There is cholelithiasis. Right kidney is absent. Suspected tiny cyst are seen within the liver. Spleen and pancreas appear within normal limits. There is no hydronephrosis. The patient has multiple tiny low-attenuation lesions within the left kidney. Several of these measure higher in density than simple cysts. Further assessment with renal protocol CT or MRI is recommended. There is calcification of the aorta. Urinary bladder is unremarkable. Prostate gland appears to be absent. There is colonic diverticulosis. There is no evidence of diverticulitis. Stool burden does appear mildly increased, which may reflect some constipation. There is no evidence of mechanical bowel obstruction. The appendix is normal. No acute osseous abnormalities are seen.       1. No obvious etiology for the patient's GI bleeding is identified. Patient does have colonic diverticulosis. There is no diverticulitis. 2. Large right pleural effusion. This may be slightly smaller than on prior study. Previously  identified left pleural effusion has almost completely resolved.  Radiation dose reduction techniques were utilized, including automated exposure control and exposure modulation based on body size.  This report was finalized on 11/2/2021 5:38 AM by Dr. Yue Frye M.D.        Medical Decision Making:  ED Course as of 11/02/21 0612   Tue Nov 02, 2021 0211 BP: 111/58 [RC]   0211 Temp: 97.1 °F (36.2 °C) [RC]   0211 Heart Rate: 83 [RC]   0211 Resp: 18 [RC]   0211 SpO2: 93 %  Room air [RC]   0520 Discussed the patient's case with Dr. Gotti.  GI will see the patient in consult.  It is recommended we call cardiology to discuss management of the patient's warfarin and to see if this needs to be changed to heparin so it can be easily stopped. [RC]   0521 Discussed the patient's case with LIZA Little with [facility].  To admit to Dr. Lam's care [RC]   0530 Patient's repeat hemoglobin at 5 AM is 7.4.  At 3:24 AM 7.2.  The bleed appears to at least stabilized. [RC]   0538 Discussed the patient's case with Dr. Farrar.  Given the warfarin is generally administered in the evening and the patient still needs to be evaluated by GI.  To hold off on stopping warfarin and starting heparin at this time. [RC]      ED Course User Index  [RC] Kirk Mcdonald III, PA       Critical Care  Performed by: Isaias Lowery MD  Authorized by: Isaias Lowery MD     Critical care provider statement:     Critical care time (minutes):  55    Critical care was necessary to treat or prevent imminent or life-threatening deterioration of the following conditions:  Shock    Critical care was time spent personally by me on the following activities:  Ordering and review of laboratory studies, ordering and review of radiographic studies, re-evaluation of patient's condition, review of old charts, examination of patient, discussions with primary provider, discussions with consultants and evaluation of patient's response to  treatment        PPE: Both the patient and I wore a surgical mask throughout the entire patient encounter. I wore protective goggles.     Diagnosis  Final diagnoses:   Lower GI bleed   Hypoglycemia        Isaias Lowery MD  11/02/21 0613

## 2021-11-03 LAB
ALBUMIN SERPL-MCNC: 3 G/DL (ref 3.5–5.2)
ALBUMIN/GLOB SERPL: 1.1 G/DL
ALP SERPL-CCNC: 89 U/L (ref 39–117)
ALT SERPL W P-5'-P-CCNC: 17 U/L (ref 1–41)
ANION GAP SERPL CALCULATED.3IONS-SCNC: 8.8 MMOL/L (ref 5–15)
AST SERPL-CCNC: 18 U/L (ref 1–40)
BH BB BLOOD EXPIRATION DATE: NORMAL
BH BB BLOOD EXPIRATION DATE: NORMAL
BH BB BLOOD TYPE BARCODE: 5100
BH BB BLOOD TYPE BARCODE: 5100
BH BB DISPENSE STATUS: NORMAL
BH BB DISPENSE STATUS: NORMAL
BH BB PRODUCT CODE: NORMAL
BH BB PRODUCT CODE: NORMAL
BH BB UNIT NUMBER: NORMAL
BH BB UNIT NUMBER: NORMAL
BILIRUB SERPL-MCNC: 0.4 MG/DL (ref 0–1.2)
BUN SERPL-MCNC: 35 MG/DL (ref 8–23)
BUN/CREAT SERPL: 22.6 (ref 7–25)
CALCIUM SPEC-SCNC: 8.2 MG/DL (ref 8.6–10.5)
CHLORIDE SERPL-SCNC: 107 MMOL/L (ref 98–107)
CO2 SERPL-SCNC: 23.2 MMOL/L (ref 22–29)
CREAT SERPL-MCNC: 1.55 MG/DL (ref 0.76–1.27)
CROSSMATCH INTERPRETATION: NORMAL
CROSSMATCH INTERPRETATION: NORMAL
DEPRECATED RDW RBC AUTO: 55 FL (ref 37–54)
ERYTHROCYTE [DISTWIDTH] IN BLOOD BY AUTOMATED COUNT: 19.4 % (ref 12.3–15.4)
GFR SERPL CREATININE-BSD FRML MDRD: 43 ML/MIN/1.73
GLOBULIN UR ELPH-MCNC: 2.8 GM/DL
GLUCOSE BLDC GLUCOMTR-MCNC: 171 MG/DL (ref 70–130)
GLUCOSE BLDC GLUCOMTR-MCNC: 175 MG/DL (ref 70–130)
GLUCOSE BLDC GLUCOMTR-MCNC: 279 MG/DL (ref 70–130)
GLUCOSE BLDC GLUCOMTR-MCNC: 279 MG/DL (ref 70–130)
GLUCOSE SERPL-MCNC: 261 MG/DL (ref 65–99)
HCT VFR BLD AUTO: 21.7 % (ref 37.5–51)
HCT VFR BLD AUTO: 26.3 % (ref 37.5–51)
HCT VFR BLD AUTO: 26.3 % (ref 37.5–51)
HCT VFR BLD AUTO: 27.2 % (ref 37.5–51)
HGB BLD-MCNC: 6.5 G/DL (ref 13–17.7)
HGB BLD-MCNC: 7.9 G/DL (ref 13–17.7)
INR PPP: 2.29 (ref 0.9–1.1)
MAGNESIUM SERPL-MCNC: 2.4 MG/DL (ref 1.6–2.4)
MCH RBC QN AUTO: 23.6 PG (ref 26.6–33)
MCHC RBC AUTO-ENTMCNC: 30 G/DL (ref 31.5–35.7)
MCV RBC AUTO: 78.5 FL (ref 79–97)
PLATELET # BLD AUTO: 151 10*3/MM3 (ref 140–450)
PMV BLD AUTO: 12.6 FL (ref 6–12)
POTASSIUM SERPL-SCNC: 5 MMOL/L (ref 3.5–5.2)
PROT SERPL-MCNC: 5.8 G/DL (ref 6–8.5)
PROTHROMBIN TIME: 25 SECONDS (ref 11.7–14.2)
RBC # BLD AUTO: 3.35 10*6/MM3 (ref 4.14–5.8)
SODIUM SERPL-SCNC: 139 MMOL/L (ref 136–145)
UNIT  ABO: NORMAL
UNIT  ABO: NORMAL
UNIT  RH: NORMAL
UNIT  RH: NORMAL
WBC # BLD AUTO: 7.47 10*3/MM3 (ref 3.4–10.8)

## 2021-11-03 PROCEDURE — 63710000001 INSULIN LISPRO (HUMAN) PER 5 UNITS: Performed by: NURSE PRACTITIONER

## 2021-11-03 PROCEDURE — 85610 PROTHROMBIN TIME: CPT | Performed by: HOSPITALIST

## 2021-11-03 PROCEDURE — 85027 COMPLETE CBC AUTOMATED: CPT | Performed by: NURSE PRACTITIONER

## 2021-11-03 PROCEDURE — 85018 HEMOGLOBIN: CPT | Performed by: HOSPITALIST

## 2021-11-03 PROCEDURE — 85014 HEMATOCRIT: CPT | Performed by: HOSPITALIST

## 2021-11-03 PROCEDURE — P9016 RBC LEUKOCYTES REDUCED: HCPCS

## 2021-11-03 PROCEDURE — 82962 GLUCOSE BLOOD TEST: CPT

## 2021-11-03 PROCEDURE — 80053 COMPREHEN METABOLIC PANEL: CPT | Performed by: HOSPITALIST

## 2021-11-03 PROCEDURE — 92610 EVALUATE SWALLOWING FUNCTION: CPT | Performed by: SPEECH-LANGUAGE PATHOLOGIST

## 2021-11-03 PROCEDURE — 99232 SBSQ HOSP IP/OBS MODERATE 35: CPT | Performed by: INTERNAL MEDICINE

## 2021-11-03 PROCEDURE — 83735 ASSAY OF MAGNESIUM: CPT | Performed by: HOSPITALIST

## 2021-11-03 PROCEDURE — 36415 COLL VENOUS BLD VENIPUNCTURE: CPT | Performed by: HOSPITALIST

## 2021-11-03 PROCEDURE — 25010000002 ONDANSETRON PER 1 MG: Performed by: NURSE PRACTITIONER

## 2021-11-03 PROCEDURE — 86900 BLOOD TYPING SEROLOGIC ABO: CPT

## 2021-11-03 PROCEDURE — 99232 SBSQ HOSP IP/OBS MODERATE 35: CPT | Performed by: NURSE PRACTITIONER

## 2021-11-03 PROCEDURE — P9040 RBC LEUKOREDUCED IRRADIATED: HCPCS

## 2021-11-03 PROCEDURE — 36430 TRANSFUSION BLD/BLD COMPNT: CPT

## 2021-11-03 RX ORDER — SODIUM CHLORIDE 9 MG/ML
100 INJECTION, SOLUTION INTRAVENOUS CONTINUOUS
Status: DISCONTINUED | OUTPATIENT
Start: 2021-11-03 | End: 2021-11-06

## 2021-11-03 RX ADMIN — DEXTROSE AND SODIUM CHLORIDE 125 ML/HR: 5; 450 INJECTION, SOLUTION INTRAVENOUS at 05:54

## 2021-11-03 RX ADMIN — SODIUM CHLORIDE, PRESERVATIVE FREE 10 ML: 5 INJECTION INTRAVENOUS at 08:46

## 2021-11-03 RX ADMIN — INSULIN LISPRO 2 UNITS: 100 INJECTION, SOLUTION INTRAVENOUS; SUBCUTANEOUS at 17:23

## 2021-11-03 RX ADMIN — PANTOPRAZOLE SODIUM 40 MG: 40 INJECTION, POWDER, FOR SOLUTION INTRAVENOUS at 08:46

## 2021-11-03 RX ADMIN — GUAIFENESIN 600 MG: 600 TABLET, EXTENDED RELEASE ORAL at 21:25

## 2021-11-03 RX ADMIN — FERROUS SULFATE TAB 325 MG (65 MG ELEMENTAL FE) 325 MG: 325 (65 FE) TAB at 12:40

## 2021-11-03 RX ADMIN — METOPROLOL SUCCINATE 25 MG: 25 TABLET, EXTENDED RELEASE ORAL at 12:40

## 2021-11-03 RX ADMIN — SODIUM CHLORIDE 75 ML/HR: 9 INJECTION, SOLUTION INTRAVENOUS at 12:39

## 2021-11-03 RX ADMIN — SODIUM BICARBONATE 1950 MG: 650 TABLET ORAL at 23:00

## 2021-11-03 RX ADMIN — DIGOXIN 125 MCG: 250 TABLET ORAL at 12:39

## 2021-11-03 RX ADMIN — INSULIN LISPRO 2 UNITS: 100 INJECTION, SOLUTION INTRAVENOUS; SUBCUTANEOUS at 08:46

## 2021-11-03 RX ADMIN — MAGNESIUM OXIDE 400 MG (241.3 MG MAGNESIUM) TABLET 400 MG: TABLET at 23:00

## 2021-11-03 RX ADMIN — ATORVASTATIN CALCIUM 40 MG: 20 TABLET, FILM COATED ORAL at 12:39

## 2021-11-03 RX ADMIN — INSULIN LISPRO 6 UNITS: 100 INJECTION, SOLUTION INTRAVENOUS; SUBCUTANEOUS at 12:38

## 2021-11-03 RX ADMIN — ONDANSETRON 4 MG: 2 INJECTION INTRAMUSCULAR; INTRAVENOUS at 07:33

## 2021-11-03 RX ADMIN — PANTOPRAZOLE SODIUM 40 MG: 40 INJECTION, POWDER, FOR SOLUTION INTRAVENOUS at 21:25

## 2021-11-03 RX ADMIN — INSULIN LISPRO 2 UNITS: 100 INJECTION, SOLUTION INTRAVENOUS; SUBCUTANEOUS at 21:25

## 2021-11-03 RX ADMIN — SODIUM CHLORIDE, PRESERVATIVE FREE 10 ML: 5 INJECTION INTRAVENOUS at 21:25

## 2021-11-03 NOTE — PROGRESS NOTES
[facility]  Inpatient Progress Note    Reason for Follow Up: GI bleed    Subjective     Interval History:   H&H relatively stable posttransfusion.  RN reports 1 small nonbloody stool last p.m.  Current INR is 2.3.  Awaiting cardiology decision regarding transition to alternative anticoagulant or allowing current anticoagulant to clear the system.  Speech path note reviewed and appreciated.  Discussed with hospitalist and RN.    Current Facility-Administered Medications:   •  acetaminophen (TYLENOL) tablet 650 mg, 650 mg, Oral, Q4H PRN **OR** acetaminophen (TYLENOL) 160 MG/5ML solution 650 mg, 650 mg, Oral, Q4H PRN **OR** acetaminophen (TYLENOL) suppository 650 mg, 650 mg, Rectal, Q4H PRN, Carmen Welch APRN  •  atorvastatin (LIPITOR) tablet 40 mg, 40 mg, Oral, Daily, Latrice Barfield APRN, 40 mg at 11/02/21 1539  •  calcium carbonate (TUMS) chewable tablet 500 mg (200 mg elemental), 2 tablet, Oral, BID PRN, Carmen Welch APRN  •  dextrose (D50W) (25 g/50 mL) IV injection 25 g, 25 g, Intravenous, Q15 Min PRN, Carmen Welch APRN  •  dextrose (D50W) (25 g/50 mL) IV injection 25 g, 25 g, Intravenous, Once, Kirk Mcdonald III, PA  •  dextrose (GLUTOSE) oral gel 15 g, 15 g, Oral, Q15 Min PRN, Carmen Welch APRN  •  dextrose 5 % and sodium chloride 0.45 % infusion, 125 mL/hr, Intravenous, Continuous, Kirk Mcdonald III, PA, Last Rate: 125 mL/hr at 11/03/21 0554, 125 mL/hr at 11/03/21 0554  •  digoxin (LANOXIN) tablet 125 mcg, 125 mcg, Oral, Daily, Latrice Barfield APRN, 125 mcg at 11/02/21 1539  •  diphenhydrAMINE (BENADRYL) capsule 25 mg, 25 mg, Oral, BID PRN, Latrice Barfield APRERICA  •  ferrous sulfate tablet 325 mg, 325 mg, Oral, Daily With Breakfast, Latrice Barfield APRN, 325 mg at 11/02/21 1539  •  glucagon (human recombinant) (GLUCAGEN DIAGNOSTIC) injection 1 mg, 1 mg, Subcutaneous, PRN, Carmen Welch APRN  •   guaiFENesin (MUCINEX) 12 hr tablet 600 mg, 600 mg, Oral, Q12H, Latrice Barfield APRN, 600 mg at 11/02/21 2001  •  HYDROcodone-acetaminophen (NORCO) 5-325 MG per tablet 1 tablet, 1 tablet, Oral, Q6H PRN, Grant Yin MD, 1 tablet at 11/02/21 2347  •  insulin lispro (ADMELOG) injection 0-9 Units, 0-9 Units, Subcutaneous, 4x Daily With Meals & Nightly, Carmen Welch APRN, 2 Units at 11/03/21 0846  •  magnesium oxide (MAG-OX) tablet 400 mg, 400 mg, Oral, BID, Latrice Barfield APRN, 400 mg at 11/02/21 2001  •  metoprolol succinate XL (TOPROL-XL) 24 hr tablet 25 mg, 25 mg, Oral, Daily, Latrice Barfield APRN, 25 mg at 11/02/21 1538  •  nitroglycerin (NITROSTAT) SL tablet 0.4 mg, 0.4 mg, Sublingual, Q5 Min PRN, Carmen Welch APRN  •  ondansetron (ZOFRAN) tablet 4 mg, 4 mg, Oral, Q6H PRN **OR** ondansetron (ZOFRAN) injection 4 mg, 4 mg, Intravenous, Q6H PRN, Carmen Welch APRN, 4 mg at 11/03/21 0733  •  pantoprazole (PROTONIX) injection 40 mg, 40 mg, Intravenous, Q12H, Carmen Welch APRN, 40 mg at 11/03/21 0846  •  sodium bicarbonate tablet 1,950 mg, 1,950 mg, Oral, BID, Latrice Barfield APRN, 1,950 mg at 11/02/21 2001  •  sodium chloride 0.9 % flush 10 mL, 10 mL, Intravenous, Q12H, Carmen Welch APRN, 10 mL at 11/03/21 0846  •  sodium chloride 0.9 % flush 10 mL, 10 mL, Intravenous, PRN, Carmen Welch APRN    Current Outpatient Medications:   •  ACCU-CHEK FASTCLIX LANCETS misc, TEST BLOOD SUGAR 3 TO 4 TIMES A DAY, Disp: 102 each, Rfl: 3  •  Aspirin Adult Low Strength 81 MG EC tablet, TAKE ONE TABLET BY MOUTH DAILY, Disp: 90 tablet, Rfl: 2  •  atorvastatin (LIPITOR) 40 MG tablet, TAKE ONE TABLET BY MOUTH DAILY, Disp: 90 tablet, Rfl: 3  •  Blood Glucose Monitoring Suppl (ACCU-CHEK IFEANYI) device, Test blood sugar tid, Disp: 1 each, Rfl: 0  •  digoxin (LANOXIN) 125 MCG tablet, TAKE ONE TABLET BY MOUTH DAILY, Disp: 90 tablet, Rfl: 3  •  diphenhydrAMINE (BENADRYL)  25 MG tablet, Take 50 mg by mouth 2 (Two) Times a Day., Disp: , Rfl:   •  enoxaparin (Lovenox) 80 MG/0.8ML solution syringe, Inject 0.9 mL under the skin into the appropriate area as directed Every 12 (Twelve) Hours for 7 days., Disp: 22.4 mL, Rfl: 0  •  ferrous gluconate (FERGON) 324 MG tablet, Take 1 tablet by mouth Every Other Day for 30 days., Disp: 15 tablet, Rfl: 0  •  furosemide (LASIX) 40 MG tablet, Take 1 tablet by mouth Daily., Disp: 30 tablet, Rfl: 0  •  glucose blood test strip, TEST BLOOD SUGAR 3 TO 4 TIMES A DAY, Disp: 100 each, Rfl: 3  •  guaiFENesin (MUCINEX) 600 MG 12 hr tablet, Take  by mouth every 12 (twelve) hours., Disp: , Rfl:   •  Insulin Disposable Pump (V-Go 40) kit, USE AS DIRECTED THREE TIMES A DAY, Disp: 30 each, Rfl: 11  •  insulin lispro (humaLOG) 100 UNIT/ML injection, USE AS DIRECTED WITH V-GO PUMP, Disp: 30 mL, Rfl: 6  •  Insulin Pen Needle (Pen Needles) 32G X 4 MM misc, USE AS DIRECTED, Disp: 100 each, Rfl: 3  •  magnesium oxide (MAG-OX) 400 MG tablet, Take 400 mg by mouth 2 (Two) Times a Day., Disp: , Rfl:   •  metoprolol succinate XL (TOPROL-XL) 25 MG 24 hr tablet, TAKE ONE TABLET BY MOUTH DAILY, Disp: 90 tablet, Rfl: 3  •  mupirocin (BACTROBAN) 2 % ointment, APPLY TO AFFECTED AREA(S) THREE TIMES A DAY, Disp: 22 each, Rfl: 0  •  Omega-3 Fatty Acids (FISH OIL) 1000 MG capsule capsule, Take 4,000 mg by mouth Daily With Breakfast., Disp: , Rfl:   •  pantoprazole (PROTONIX) 40 MG EC tablet, Take 1 tablet by mouth Daily for 30 days., Disp: 30 tablet, Rfl: 0  •  SAFETY LANCETS 21G misc, Test blood sugar twice a day, Disp: 200 each, Rfl: 3  •  sodium bicarbonate 650 MG tablet, Take 1,950 mg by mouth 2 (Two) Times a Day., Disp: , Rfl:   •  Tresiba FlexTouch 100 UNIT/ML solution pen-injector injection, INJECT 65 UNITS UNDER THE SKIN DAILY, Disp: 24 pen, Rfl: 3  •  warfarin (COUMADIN) 5 MG tablet, TAKE 1 AND 1/2 TABLET BY MOUTH DAILY ON MONDAY, WEDNESDAY, AND FRIDAY. TAKE ONE TABLET BY  MOUTH DAILY ON ALL OTHER DAYS OF THE WEEK, Disp: 110 tablet, Rfl: 0  Review of Systems:    All systems were reviewed and negative except for:  Gastrointestinal: positive for  See HPI    Objective     Vital Signs  Temp:  [97.6 °F (36.4 °C)-97.8 °F (36.6 °C)] 97.8 °F (36.6 °C)  Heart Rate:  [61-80] 67  Resp:  [16-20] 18  BP: (122-150)/(62-74) 143/74  Body mass index is 24.41 kg/m².    Intake/Output Summary (Last 24 hours) at 11/3/2021 0924  Last data filed at 11/3/2021 0845  Gross per 24 hour   Intake 2349.33 ml   Output 2100 ml   Net 249.33 ml     I/O this shift:  In: -   Out: 250 [Urine:250]     Physical Exam:   General: patient awake, alert and cooperative   Eyes: Normal lids and lashes, no scleral icterus   Neck: supple, normal ROM   Skin: warm and dry, not jaundiced   Cardiovascular: regular rhythm and rate, no murmurs auscultated   Pulm: clear to auscultation bilaterally, regular and unlabored   Abdomen: soft, nontender, nondistended; normal bowel sounds   Extremities: no rash or edema   Psychiatric: Normal mood and behavior; memory intact     Results Review:     I reviewed the patient's new clinical results.    Results from last 7 days   Lab Units 11/03/21 0447 11/03/21  0001 11/02/21  1926 11/02/21  0455 11/02/21 0324 11/01/21  0941 11/01/21  0941   WBC 10*3/mm3 7.47  --   --   --  5.78  --  6.29   HEMOGLOBIN g/dL 7.9*  7.9* 7.9* 8.1*   < > 7.2*   < > 8.8*   HEMATOCRIT % 26.3*  26.3* 27.2* 27.3*   < > 24.6*   < > 30.2*   PLATELETS 10*3/mm3 151  --   --   --  151  --  186    < > = values in this interval not displayed.     Results from last 7 days   Lab Units 11/03/21 0447 11/02/21 0324 11/01/21  0941   SODIUM mmol/L 139 144 141   POTASSIUM mmol/L 5.0 4.7 4.8   CHLORIDE mmol/L 107 111* 105   CO2 mmol/L 23.2 23.8 26.1   BUN mg/dL 35* 40* 40*   CREATININE mg/dL 1.55* 1.53* 1.60*   CALCIUM mg/dL 8.2* 8.2* 8.6   BILIRUBIN mg/dL 0.4 0.3  --    ALK PHOS U/L 89 92  --    ALT (SGPT) U/L 17 21  --    AST (SGOT)  U/L 18 23  --    GLUCOSE mg/dL 261* 44* 105*     Results from last 7 days   Lab Units 11/03/21  0447 11/02/21  0324 11/01/21  0941   INR  2.29* 2.14* 1.78*     No results found for: LIPASE    Radiology:  XR Chest 1 View   Final Result   Moderate right pleural effusion appears decreased from the   prior exam. Atelectasis/infiltrate is apparent in the right lower lung.   Continued follow-up recommended.         This report was finalized on 11/2/2021 4:34 PM by Dr. Chava Luke M.D.          NM GI Blood Loss   Final Result   Active GI bleeding from the right lower quadrant in the   region of either the distal ileum or the proximal ascending colon. I   discussed the case with Dr. Al Gerard at around 3:45 PM on the   day of the exam.       This report was finalized on 11/2/2021 5:51 PM by Dr. Grant Jorge M.D.          CT Abdomen Pelvis With Contrast   Final Result       1. No obvious etiology for the patient's GI bleeding is identified.   Patient does have colonic diverticulosis. There is no diverticulitis.   2. Large right pleural effusion. This may be slightly smaller than on   prior study. Previously identified left pleural effusion has almost   completely resolved.       Radiation dose reduction techniques were utilized, including automated   exposure control and exposure modulation based on body size.       This report was finalized on 11/2/2021 5:38 AM by Dr. Yue Frye M.D.              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 GI bleeding in the form of melena as well as bright red blood per rectum: Difficult to define source as post polypectomy versus small bowel in origin.  2. Anticoagulant therapy with elevated INR although Coumadin is currently on hold.        Plan:  · Continue to monitor  · Defer transfusions to hospitalist if needed  · When all agree stable and INR is in a safe range we will anticipate repeat colonoscopic evaluation.  · May eventually warrant capsule enteroscopy  · Speech path requested VFSS when all agree stable to proceed  I discussed the patients findings and my recommendations with patient, family, nursing staff and primary care team.    Al Gerard MD

## 2021-11-03 NOTE — PLAN OF CARE
Problem: Adult Inpatient Plan of Care  Goal: Plan of Care Review  11/3/2021 0458 by Emilee Lindsay, RN  Flowsheets (Taken 11/3/2021 0458)  Progress: improving  Plan of Care Reviewed With: patient  Outcome Summary: meds per order, no falls, see labs and vs   Goal Outcome Evaluation:  Plan of Care Reviewed With: patient        Progress: improving  Outcome Summary: meds per order, no falls, see labs and vs

## 2021-11-03 NOTE — THERAPY EVALUATION
Acute Care - Speech Language Pathology   Swallow Initial Evaluation [facility]     Patient Name: Francisco Sequeira  : 1935  MRN: [id number]  Today's Date: 11/3/2021               Admit Date: 2021    Visit Dx:     ICD-10-CM ICD-9-CM   1. Lower GI bleed  K92.2 578.9   2. Hypoglycemia  E16.2 251.2   3. On warfarin therapy  Z79.01 V58.61   4. 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)   • Iron deficiency anemia   • Chronic anticoagulation   • Lower GI bleed   • History of nephrectomy   • Abnormal urinalysis   • Recurrent right pleural effusion   • Hemiparesis of left nondominant side as late effect of cerebral infarction (HCC)     Past Medical History:   Diagnosis Date   • Allergic rhinitis    • Aortic valve insufficiency    • Ascending aortic aneurysm (HCC)    • Atrial fibrillation (HCC)    • Bacteremia    • Calcific aortic stenosis of bicuspid valve    • Cardiac arrest (HCC)    • Cardiomyopathy (HCC)    • CKD (chronic kidney disease) stage 3, GFR 30-59 ml/min (HCC)    • Contact dermatitis due to poison ivy    • Elbow fracture    • Esophageal reflux    • GERD (gastroesophageal reflux disease)    • Head injury    • History of transfusion    • Hyperglycemia    • Hyperlipidemia    • Hypertension    • Kidney carcinoma (HCC)    • Nonischemic cardiomyopathy (HCC)    • Renal oncocytoma    • Seasonal allergic reaction    • Sinusitis     • Syncope    • Type 2 diabetes mellitus (HCC)     uncontrolled   • Visual field defect      Past Surgical History:   Procedure Laterality Date   • AORTIC VALVE REPAIR/REPLACEMENT     • ASCENDING AORTIC ANEURYSM REPAIR W/ MECHANICAL AORTIC VALVE REPLACEMENT     • COLONOSCOPY N/A 10/28/2021    Procedure: COLONOSCOPY to cecum:  cold snare polyps,;  Surgeon: Dinesh Meza MD;  Location: [facility] ENDOSCOPY;  Service: Gastroenterology;  Laterality: N/A;  pre:  Iron deficiency anemia  post:  polyps, diverticulosis,    • ENDOSCOPY N/A 10/28/2021    Procedure: ESOPHAGOGASTRODUODENOSCOPY with biopsies;  Surgeon: Dinesh Meza MD;  Location: [facility] ENDOSCOPY;  Service: Gastroenterology;  Laterality: N/A;  pre:  Iron deficiency anemia  post:  duodenitis and gastritis   • EYE SURGERY  12/09/2020    cataract surgery    • NEPHRECTOMY     • OTHER SURGICAL HISTORY      elbow surgery   • PROSTATE SURGERY     • THORACENTESIS Left     diagnostic       SLP Recommendation and Plan  SLP Swallowing Diagnosis: suspected pharyngeal dysphagia (11/03/21 0900)  SLP Diet Recommendation: clear liquid diet, thin liquids (11/03/21 0900)  Recommended Precautions and Strategies: upright posture during/after eating, small bites of food and sips of liquid (11/03/21 0900)  SLP Rec. for Method of Medication Administration: meds crushed, with pudding or applesauce (1/4 tsp bites) (11/03/21 0900)     Monitor for Signs of Aspiration: yes, notify SLP if any concerns, other (see comments) (make patient NPO if any difficulty noted) (11/03/21 0900)  Recommended Diagnostics: VFSS (MBS), other (see comments) (when cleared by GI) (11/03/21 0900)  Swallow Criteria for Skilled Therapeutic Interventions Met: demonstrates skilled criteria (11/03/21 0900)  Anticipated Discharge Disposition (SLP): home with assist (11/03/21 0900)  Rehab Potential/Prognosis, Swallowing: good, to achieve stated therapy goals (11/03/21 0900)  Therapy Frequency (Swallow):  PRN (11/03/21 0900)  Predicted Duration Therapy Intervention (Days): until discharge (11/03/21 0900)      Plan for Continued Treatment (SLP): VFSS when cleared by GI (11/03/21 0900)       Plan of Care Reviewed With: patient, spouse  Progress: improving  Outcome Summary: Swallow evaluation completed. Recommend clear liquid diet (thins) with meds crushed in puree. Meds should be given in 1/4 tsp bites of puree. Patient needs to be upright to eat and should take small bites and sips. Patient is okay to use straws. If patient coughs or chokes then downgrade to NPO. Patient will need a VFSS when cleared for PO/barium by GI. SLP to follow to determine when patient is ready for VFSS.     Patient was not wearing a face mask during this therapy encounter. Therapist used appropriate personal protective equipment including mask, eye protection and gloves.  Mask used was standard procedure mask. Appropriate PPE was worn during the entire therapy session. Hand hygiene was completed before and after therapy session. Patient is not in enhanced droplet precautions.       SWALLOW EVALUATION (last 72 hours)     SLP Adult Swallow Evaluation     Row Name 11/03/21 0900                   Rehab Evaluation    Document Type evaluation  -JJ        Subjective Information no complaints  -JJ        Patient Observations alert; cooperative; agree to therapy  -JJ        Patient/Family/Caregiver Comments/Observations Wife at bedside for evaluation  -JJ        Care Plan Review evaluation/treatment results reviewed; care plan/treatment goals reviewed; patient/other agree to care plan  -JJ        Patient Effort good  -JJ        Symptoms Noted During/After Treatment none  -JJ                  General Information    Patient Profile Reviewed yes  -JJ        Pertinent History Of Current Problem Pt admitted with recurrent GI bleed, AFIB, pleural effusion, CHf. Hx of CVA, mechanical aortic valve, DMII, CKD3  -JJ        Current Method of Nutrition NPO  -JJ         Precautions/Limitations, Vision WFL; for purposes of eval  -JJ        Precautions/Limitations, Hearing WFL; for purposes of eval  -JJ        Prior Level of Function-Communication WFL  -JJ        Prior Level of Function-Swallowing no diet consistency restrictions; other (see comments)  intermittent coughing with solids at home  -JJ        Plans/Goals Discussed with patient; spouse/S.O.; agreed upon  -JJ        Barriers to Rehab none identified  -JJ        Patient's Goals for Discharge return to PO diet  -JJ        Family Goals for Discharge patient able to return to PO diet  -JJ                  Oral Motor Structure and Function    Dentition Assessment natural, present and adequate  -JJ        Secretion Management WNL/WFL  -JJ        Mucosal Quality moist, healthy  -JJ        Volitional Swallow delayed  -JJ        Volitional Cough WFL  -JJ                  Oral Musculature and Cranial Nerve Assessment    Oral Motor General Assessment generalized oral motor weakness  -JJ                  General Eating/Swallowing Observations    Respiratory Support Currently in Use room air  -JJ        Eating/Swallowing Skills fed by SLP  -JJ        Positioning During Eating upright 90 degree; upright in bed  -JJ        Utensils Used spoon; cup; straw  -JJ        Consistencies Trialed pureed; ice chips; thin liquids  limited trials due to GI concerns, previously was on clear liquids  -JJ                  Respiratory    Respiratory Status WFL  -JJ                  Clinical Swallow Eval    Oral Prep Phase WFL  -JJ        Oral Transit WFL  -JJ        Oral Residue WFL  -JJ        Pharyngeal Phase suspected pharyngeal impairment  -JJ        Esophageal Phase unremarkable  -JJ        Clinical Swallow Evaluation Summary Patient in with recurrent GI bleed. RN said patient was okay for small amounts of PO to determine safety.Patient observed with ice chips, thins via spoon/cup/straw, amd 1/4 tsp bites of puree. RN reported that patient  choked last night with pills in puree. On this date no coughing or choking was observed. The patient's swallow was effortful, especially with puree. He also exhibited an audible swallow which could inidicate mistiming. Patient appears to be safe to resume a clear liquid diet with thins and meds crushed in puree.  -J                  Clinical Impression    SLP Swallowing Diagnosis suspected pharyngeal dysphagia  -JJ        Functional Impact risk of aspiration/pneumonia  -JJ        Rehab Potential/Prognosis, Swallowing good, to achieve stated therapy goals  -JJ        Swallow Criteria for Skilled Therapeutic Interventions Met demonstrates skilled criteria  -J        Plan for Continued Treatment (SLP) VFSS when cleared by GI  -JJ                  Recommendations    Therapy Frequency (Swallow) PRN  -JJ        Predicted Duration Therapy Intervention (Days) until discharge  -J        SLP Diet Recommendation clear liquid diet; thin liquids  -J        Recommended Diagnostics VFSS (MBS); other (see comments)  when cleared by GI  -JJ        Recommended Precautions and Strategies upright posture during/after eating; small bites of food and sips of liquid  -J        Oral Care Recommendations Oral Care BID/PRN; Oral Care before breakfast, after meals and PRN  -JJ        SLP Rec. for Method of Medication Administration meds crushed; with pudding or applesauce  1/4 tsp bites  -J        Monitor for Signs of Aspiration yes; notify SLP if any concerns; other (see comments)  make patient NPO if any difficulty noted  -J        Anticipated Discharge Disposition (SLP) home with assist  -J              User Key  (r) = Recorded By, (t) = Taken By, (c) = Cosigned By    Initials Name Effective Dates    Carmen Montesinos, MS CCC-SLP 06/16/21 -                 EDUCATION  The patient has been educated in the following areas:   Dysphagia (Swallowing Impairment) Modified Diet Instruction.         SLP Outcome Measures (last 72  hours)     SLP Outcome Measures     Row Name 11/03/21 0900             SLP Outcome Measures    Outcome Measure Used? Adult NOMS  -              Adult FCM Scores    FCM Chosen Swallowing  -      Swallowing FCM Score 5  -            User Key  (r) = Recorded By, (t) = Taken By, (c) = Cosigned By    Initials Name Effective Dates    Carmen Montesinos, MS ROLLINS-SLP 06/16/21 -                  Time Calculation:    Time Calculation- SLP     Row Name 11/03/21 0922             Time Calculation- SLP    SLP Received On 11/03/21  -            User Key  (r) = Recorded By, (t) = Taken By, (c) = Cosigned By    Initials Name Provider Type    Carmen Montesinos, MS CCC-SLP Speech and Language Pathologist                Therapy Charges for Today     Code Description Service Date Service Provider Modifiers Qty    [id number]  ST EVAL ORAL PHARYNG SWALLOW 4 11/3/2021 Carmen Zavala, MS CCC-SLP GN 1               Carmen Zavala MS CCC-SLP  11/3/2021

## 2021-11-03 NOTE — PLAN OF CARE
Problem: Adult Inpatient Plan of Care  Goal: Plan of Care Review  Outcome: Ongoing, Progressing  Flowsheets (Taken 11/3/2021 1841)  Progress: improving  Plan of Care Reviewed With: patient  Outcome Summary: pt admitted to [facility] 6.5 - x2 units of blood given. Wife at bedside - updated. Will continue to monitor.

## 2021-11-03 NOTE — PROGRESS NOTES
"      [facility]         Dr Castillo Sayied   LOS: 1 day   Patient Care Team:  Rubin Hinkle APRN as PCP - General (Family Medicine)  Audra Vazquez RPH as Pharmacist  Vasquez Niño PharmD as Pharmacist (Pharmacy)    Chief Complaint: Lower GI bleed with bilateral pleural effusion status post thoracentesis transudate of effusion negative for malignancy history of aortic valve replacement A. fib    Interval History: Ongoing GI bleed.  Currently getting blood transfusion.  Breathing remains stable.  Currently on room air    REVIEW OF SYSTEMS:   CARDIOVASCULAR: No chest pain, chest pressure or chest discomfort. No palpitations or edema.   RESPIRATORY: No shortness of breath, cough or sputum.   GASTROINTESTINAL: Abdominal pain right lower quadrant with bright red blood per rectum      Ventilator/Non-Invasive Ventilation Settings (From admission, onward)            None      On room air      Vital Signs  Temp:  [97.6 °F (36.4 °C)-98.1 °F (36.7 °C)] 98.1 °F (36.7 °C)  Heart Rate:  [61-80] 79  Resp:  [16-20] 18  BP: ()/(53-74) 102/64    Intake/Output Summary (Last 24 hours) at 11/3/2021 1515  Last data filed at 11/3/2021 1300  Gross per 24 hour   Intake 2031 ml   Output 1975 ml   Net 56 ml     Flowsheet Rows      First Filed Value   Admission Height 182.9 cm (72\") Documented at 11/02/2021 0344   Admission Weight 81.6 kg (180 lb) Documented at 11/02/2021 0344          Physical Exam:  Patient is examined using the personal protective equipment as per guidelines from infection control for this particular patient as enacted.  Hand hygiene was performed before and after patient interaction.   General Appearance:    Alert, cooperative, in no acute distress.  Following simple commands  Neck midline trachea, no thyromegaly   Lungs:    Diminished breath sounds right side with occasional crackles right midlung.  No labored breathing    Heart:    Regular rhythm and normal rate, normal S1 and S2, no            " murmur, no gallop, no rub, no click   Chest Wall:    No abnormalities observed   Abdomen:    Tenderness more localized to the right lower quadrant with rebound and guarding.  Hypoactive bowel sounds   Extremities:   Moves all extremities well, no edema, no cyanosis, no             redness  CNS no focal neurological deficits normal sensory exam  Skin no rashes no nodules  Musculoskeletal no cyanosis no clubbing normal range of motion     Results Review:        Results from last 7 days   Lab Units 11/03/21 0447 11/02/21 0324 11/02/21 0324 11/01/21 0941 11/01/21  0941   SODIUM mmol/L 139  --  144  --  141   POTASSIUM mmol/L 5.0  --  4.7  --  4.8   CHLORIDE mmol/L 107  --  111*  --  105   CO2 mmol/L 23.2  --  23.8  --  26.1   BUN mg/dL 35*  --  40*  --  40*   CREATININE mg/dL 1.55*  --  1.53*  --  1.60*   GLUCOSE mg/dL 261*   < > 44*   < > 105*   CALCIUM mg/dL 8.2*  --  8.2*  --  8.6    < > = values in this interval not displayed.         Results from last 7 days   Lab Units 11/03/21  1211 11/03/21 0447 11/03/21  0001 11/02/21 0455 11/02/21 0324 11/01/21 0941 11/01/21  0941   WBC 10*3/mm3  --  7.47  --   --  5.78  --  6.29   HEMOGLOBIN g/dL 6.5* 7.9*  7.9* 7.9*   < > 7.2*   < > 8.8*   HEMATOCRIT % 21.7* 26.3*  26.3* 27.2*   < > 24.6*   < > 30.2*   PLATELETS 10*3/mm3  --  151  --   --  151  --  186    < > = values in this interval not displayed.     Results from last 7 days   Lab Units 11/03/21  0447 11/02/21  0324 11/01/21  0941 10/30/21  0920 10/29/21  0631 10/28/21  2008 10/28/21  1229   INR  2.29* 2.14* 1.78*   < > 1.27*  --    < >   APTT seconds  --  70.3*  --   --  115.6* 71.6*  --     < > = values in this interval not displayed.         Results from last 7 days   Lab Units 11/03/21  0447   MAGNESIUM mg/dL 2.4               I reviewed the patient's new clinical results.  I personally viewed and interpreted the patient's chest x-ray.        Medication Review:   atorvastatin, 40 mg, Oral, Daily  dextrose,  25 g, Intravenous, Once  digoxin, 125 mcg, Oral, Daily  ferrous sulfate, 325 mg, Oral, Daily With Breakfast  guaiFENesin, 600 mg, Oral, Q12H  insulin lispro, 0-9 Units, Subcutaneous, 4x Daily With Meals & Nightly  magnesium oxide, 400 mg, Oral, BID  metoprolol succinate XL, 25 mg, Oral, Daily  pantoprazole, 40 mg, Intravenous, Q12H  sodium bicarbonate, 1,950 mg, Oral, BID  sodium chloride, 10 mL, Intravenous, Q12H        sodium chloride, 75 mL/hr, Last Rate: 75 mL/hr (11/03/21 1239)        ASSESSMENT:   Bilateral pleural effusion right greater than left  Acute GI bleed suspect lower  History of kidney cancer  History of aortic valve replacement mechanical  Diabetes mellitus  Severe pulmonary hypertension on echo  A. fib  Hypertension  Left-sided weakness  Anemia acute blood loss    PLAN:  At this point we have an elderly gentleman well-known to me with bilateral pleural effusion transudative from previous thoracentesis.  Currently admitted with acute GI bleed with nuclear scan showing right lower quadrant acute GI bleeding.  As far as his pleural effusion is concerned it looks stable but there is still significant amount effusion that needs to be tapped again.  Currently he is on room air and on no acute respiratory distress as such.  There is no urgency for thoracentesis at this time.  I will let his acute GI bleeding issues resolve and at some point when he is off anticoagulation would recommend another thoracentesis and continuing diuretics to see if no significant recurrent effusion.  Hopefully can have endoscopy soon to find out the source of bleeding.  Reviewed GI evaluation  We will follow along closely      Jonathan Blackmon MD  11/03/21  15:15 EDT

## 2021-11-03 NOTE — PROGRESS NOTES
"    Name: Francisco Sequeira ADMIT: 2021   : 1935  PCP: Rubin Hinkle APRN    MRN: [id number] LOS: 1 days   AGE/SEX: 85 y.o. male  ROOM:      Subjective   Subjective   Feeling pretty weak today. No SOA or CP or palp. HR and BP fine. Still c/o soreness in RLQ. No rectal blood since yesterday evening. Had some nausea and \"dry heaves\" overnight but none this AM. Not interested in eating/drinking much. He had some coughing with pills noted by RN. Wife says this is not unusual for him.    Review of Systems   Constitutional: Positive for appetite change and fatigue. Negative for chills, diaphoresis and fever.   HENT: Negative for congestion, nosebleeds, trouble swallowing and voice change.    Eyes: Negative for pain and visual disturbance.   Respiratory: Positive for choking (with pills, wife says this is normal for him). Negative for cough, chest tightness and shortness of breath.    Cardiovascular: Negative for chest pain, palpitations and leg swelling.   Gastrointestinal: Positive for abdominal pain (Right side) and blood in stool. Negative for diarrhea, nausea and vomiting.   Endocrine: Negative for cold intolerance and heat intolerance.   Genitourinary: Negative for decreased urine volume, difficulty urinating, dysuria and hematuria.   Musculoskeletal: Negative for back pain and neck pain.   Skin: Positive for pallor. Negative for color change and rash.   Allergic/Immunologic: Positive for environmental allergies. Negative for food allergies.   Neurological: Positive for weakness (chronic left hemiparesis). Negative for seizures, syncope, facial asymmetry, speech difficulty, numbness and headaches.   Hematological: Negative for adenopathy. Does not bruise/bleed easily.   Psychiatric/Behavioral: Negative for behavioral problems, confusion and hallucinations.        Objective   Objective   Vital Signs  Temp:  [97.6 °F (36.4 °C)-97.8 °F (36.6 °C)] 97.8 °F (36.6 °C)  Heart Rate:  [61-80] 67  Resp:  " [16-20] 18  BP: (122-150)/(62-74) 143/74  SpO2:  [96 %-100 %] 100 %  on   ;   Device (Oxygen Therapy): room air  Body mass index is 24.41 kg/m².  Physical Exam  Vitals and nursing note reviewed. Exam conducted with a chaperone present (wife).   Constitutional:       General: He is not in acute distress.     Appearance: He is ill-appearing. He is not toxic-appearing or diaphoretic.   HENT:      Head: Normocephalic and atraumatic.      Right Ear: External ear normal.      Left Ear: External ear normal.      Nose: Nose normal.      Mouth/Throat:      Mouth: Mucous membranes are moist.      Pharynx: Oropharynx is clear.   Eyes:      General: No scleral icterus.        Right eye: No discharge.         Left eye: No discharge.      Extraocular Movements: Extraocular movements intact.      Conjunctiva/sclera: Conjunctivae normal.   Cardiovascular:      Rate and Rhythm: Normal rate. Rhythm irregular.      Pulses: Normal pulses.      Heart sounds: Murmur (c/w mechanical AV) heard.       Pulmonary:      Effort: Pulmonary effort is normal. No respiratory distress.      Breath sounds: Normal breath sounds. No wheezing or rales.      Comments: Decreased BS right base  Abdominal:      General: Bowel sounds are normal. There is no distension.      Palpations: Abdomen is soft.      Tenderness: There is abdominal tenderness (RLQ). There is no guarding or rebound.   Musculoskeletal:         General: No swelling or deformity. Normal range of motion.      Cervical back: Neck supple. No rigidity.   Lymphadenopathy:      Cervical: No cervical adenopathy.   Skin:     General: Skin is warm and dry.      Capillary Refill: Capillary refill takes less than 2 seconds.      Coloration: Skin is pale. Skin is not jaundiced.      Findings: No rash.   Neurological:      Mental Status: He is alert and oriented to person, place, and time. Mental status is at baseline.      Cranial Nerves: No cranial nerve deficit.      Motor: Weakness (very mild  chronic left weakness, at baseline) present.      Coordination: Coordination normal.   Psychiatric:         Mood and Affect: Mood normal.         Behavior: Behavior normal.         Thought Content: Thought content normal.         Results Review     I reviewed the patient's new clinical results.  Results from last 7 days   Lab Units 11/03/21 0447 11/03/21  0001 11/02/21  1926 11/02/21  0455 11/02/21 0324 11/02/21 0324 11/01/21 0941 11/01/21  0941 10/30/21  0920 10/30/21  0920   WBC 10*3/mm3 7.47  --   --   --   --  5.78  --  6.29  --  5.17  4.85   HEMOGLOBIN g/dL 7.9*  7.9* 7.9* 8.1* 7.4*   < > 7.2*   < > 8.8*   < > 9.3*  9.1*   PLATELETS 10*3/mm3 151  --   --   --   --  151  --  186  --  161  162    < > = values in this interval not displayed.     Results from last 7 days   Lab Units 11/03/21  0447 11/02/21  0324 11/01/21  0941 10/30/21  0602   SODIUM mmol/L 139 144 141 138   POTASSIUM mmol/L 5.0 4.7 4.8 4.4   CHLORIDE mmol/L 107 111* 105 103   CO2 mmol/L 23.2 23.8 26.1 24.4   BUN mg/dL 35* 40* 40* 31*   CREATININE mg/dL 1.55* 1.53* 1.60* 1.46*   GLUCOSE mg/dL 261* 44* 105* 175*   EGFR IF NONAFRICN AM mL/min/1.73 43* 44* 41* 46*     Results from last 7 days   Lab Units 11/03/21 0447 11/02/21  0324   ALBUMIN g/dL 3.00* 3.30*   BILIRUBIN mg/dL 0.4 0.3   ALK PHOS U/L 89 92   AST (SGOT) U/L 18 23   ALT (SGPT) U/L 17 21     Results from last 7 days   Lab Units 11/03/21  0447 11/02/21  0324 11/01/21  0941 10/30/21  0602   CALCIUM mg/dL 8.2* 8.2* 8.6 8.1*   ALBUMIN g/dL 3.00* 3.30*  --   --    MAGNESIUM mg/dL 2.4  --   --   --      Results from last 7 days   Lab Units 11/02/21  0324   PROCALCITONIN ng/mL 0.12     COVID19   Date Value Ref Range Status   11/02/2021 Not Detected Not Detected - Ref. Range Final   10/26/2021 Not Detected Not Detected - Ref. Range Final     Glucose   Date/Time Value Ref Range Status   11/03/2021 0605 279 (H) 70 - 130 mg/dL Final     Comment:     Meter: [id number] : [id number] Vernon  Radha [employer]RT[employer]   11/02/2021 1949 275 (H) 70 - 130 mg/dL Final     Comment:     Meter: [id number] : [id number] Isaias GONZALEZ MHTech   11/02/2021 1709 181 (H) 70 - 130 mg/dL Final     Comment:     Meter: [id number] : [id number] Hua Belle RN   11/02/2021 1125 102 70 - 130 mg/dL Final     Comment:     Meter: [id number] : [id number] Hua Belle RN   11/02/2021 0610 104 70 - 130 mg/dL Final     Comment:     Meter: [id number] : [id number] Jey Peresrey RN   11/02/2021 0457 124 70 - 130 mg/dL Final     Comment:     Meter: [id number] : [id number] Brian Velez RN       NM GI Blood Loss  Narrative: NUCLEAR MEDICINE GI BLEED STUDY     HISTORY: Colonoscopy with multiple cold snare polypectomy 10/28/2021;  anticoagulated. GI bleed.     TECHNIQUE: 28 mCi of technetium was used to label the patient's red  blood cells which were reinjected. Images of the abdomen and pelvis were  acquired for one hour. These are correlated with abdomen and pelvis CT  from earlier this morning.     FINDINGS: There is normal radiotracer activity in the heart, spleen,  liver, genitals, and abdominal and pelvic major blood vessels.     Focal, abnormal radiotracer uptake is apparent early and continuously  throughout the exam and the right lower quadrant. This localizes to the  region of the distal terminal ileum or the cecum. A second hour of  imaging to follow the subsequent distribution of the radiotracer was not  performed as the patient needed to discontinue the exam after one hour.     Impression: Active GI bleeding from the right lower quadrant in the  region of either the distal ileum or the proximal ascending colon. I  discussed the case with Dr. Al Gerard at around 3:45 PM on the  day of the exam.     This report was finalized on 11/2/2021 5:51 PM by Dr. Grant Jorge M.D.     XR Chest 1 View  Narrative: XR CHEST 1 VW-     HISTORY: Male who is 85 years-old,  pleural effusion     TECHNIQUE: Frontal view  of the chest     COMPARISON: 10/23/2021     FINDINGS: The heart size is borderline. Sternotomy wires are present.  Pulmonary vasculature is unremarkable. Moderate right pleural effusion  appears decreased from the prior exam. Atelectasis/infiltrate is  apparent in the right lower lung. No pneumothorax. No acute osseous  process.     Impression: Moderate right pleural effusion appears decreased from the  prior exam. Atelectasis/infiltrate is apparent in the right lower lung.  Continued follow-up recommended.       This report was finalized on 11/2/2021 4:34 PM by Dr. Chava Luke M.D.     Adult transthoracic echo complete  Addendum: · Estimated left ventricular EF = 60% Left ventricular systolic function  is normal.   · Left ventricular diastolic function was indeterminate.   · The right ventricular cavity is mild to moderately dilated.   · Left atrial volume is moderately increased.   · The right atrial cavity is moderately dilated.   · There is a mechanical aortic valve present. The aortic valve peak and  mean gradients are within defined limits.   · Moderate tricuspid valve regurgitation is present.   · Severe pulmonary hypertension is present. Calculated right ventricular  systolic pressure from tricuspid regurgitation is 63.7 mmHg.  Narrative: · Estimated left ventricular EF = 60% Left ventricular systolic function   is normal.  · Left ventricular diastolic function was indeterminate.  · The right ventricular cavity is mild to moderately dilated.  · Left atrial volume is moderately increased.  · The right atrial cavity is moderately dilated.  · There is a bioprosthetic aortic valve present. The aortic valve peak and   mean gradients are within defined limits.  · Moderate tricuspid valve regurgitation is present.  · Severe pulmonary hypertension is present. Calculated right ventricular   systolic pressure from tricuspid regurgitation is 63.7 mmHg.     CT Abdomen Pelvis With Contrast  Narrative: CT OF THE  ABDOMEN AND PELVIS WITH CONTRAST     HISTORY: GI bleeding     COMPARISON: 10/25/2021 and other prior imaging     TECHNIQUE: Axial CT imaging was obtained through the abdomen and pelvis.  IV contrast was administered.     FINDINGS:  Large right pleural effusion is again noted. It appears slightly smaller  than on prior exam. A previously identified left pleural effusion has  almost completely resolved. The stomach and duodenum appear  unremarkable. There is cholelithiasis. Right kidney is absent. Suspected  tiny cyst are seen within the liver. Spleen and pancreas appear within  normal limits. There is no hydronephrosis. The patient has multiple tiny  low-attenuation lesions within the left kidney. Several of these measure  higher in density than simple cysts. Further assessment with renal  protocol CT or MRI is recommended. There is calcification of the aorta.  Urinary bladder is unremarkable. Prostate gland appears to be absent.  There is colonic diverticulosis. There is no evidence of diverticulitis.  Stool burden does appear mildly increased, which may reflect some  constipation. There is no evidence of mechanical bowel obstruction. The  appendix is normal. No acute osseous abnormalities are seen.     Impression:    1. No obvious etiology for the patient's GI bleeding is identified.  Patient does have colonic diverticulosis. There is no diverticulitis.  2. Large right pleural effusion. This may be slightly smaller than on  prior study. Previously identified left pleural effusion has almost  completely resolved.     Radiation dose reduction techniques were utilized, including automated  exposure control and exposure modulation based on body size.     This report was finalized on 11/2/2021 5:38 AM by Dr. Yue Frye M.D.       Scheduled Medications  atorvastatin, 40 mg, Oral, Daily  dextrose, 25 g, Intravenous, Once  digoxin, 125 mcg, Oral, Daily  ferrous sulfate, 325 mg, Oral, Daily With  "Breakfast  guaiFENesin, 600 mg, Oral, Q12H  insulin lispro, 0-9 Units, Subcutaneous, 4x Daily With Meals & Nightly  magnesium oxide, 400 mg, Oral, BID  metoprolol succinate XL, 25 mg, Oral, Daily  pantoprazole, 40 mg, Intravenous, Q12H  sodium bicarbonate, 1,950 mg, Oral, BID  sodium chloride, 10 mL, Intravenous, Q12H    Infusions  dextrose 5 % and sodium chloride 0.45 %, 125 mL/hr, Last Rate: 125 mL/hr (11/03/21 0554)    Diet  Diet Clear Liquid       Assessment/Plan     Active Hospital Problems    Diagnosis  POA   • **Lower GI bleed [K92.2]  Yes   • History of nephrectomy [Z90.5]  Not Applicable   • Abnormal urinalysis [R82.90]  Yes   • Recurrent right pleural effusion [J90]  Yes   • 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     Very pleasant 84yo gentleman with h/o DM2, AFib, Mech AV, chronic AC (Coumadin), HTN, CKD3, h/o RCC s/p right nephrectomy, and h/o CVA with mild residual left hemiparesis, just admitted here from 10/23 to 10/30 for \"obscure\" GI bleed and bilateral pleural effusions (transudate), who presented to ER with c/o rectal bleeding and generalized weakness. Found to have drop in Hgb from 9.3 to 7.2. Stool grossly bloody in ER.     D/w pt, wife, and Dr. Gerard  Tough situation  Tagged RBC scan suggests bleed in RLQ c/w distal ileum or proximal colon, ?post-polypectomy bleed  Needs C/S but GI wants INR at least " down to 1.8  D/w Dr. Cárdenas and she suggests continuing to hold AC for now and allowing INR to drop on it's own rather than starting heparin gtt and reversing INR  D/w pt and wife and they understand risks involved with whatever action we take  For now will continue to monitor serial Hgb with as needed transfusion support and wait for INR to drop to at least 1.8--C/S at that time  CLD okay per GI  Hgb stable after 2 units PRBCs    Imaging and exam suggest recurrence of right pleural effusion, Pulm consulted and agree that repeat thoracentesis indicated when GI issues resolved, O2 fine at present and no SOA    SLP has seen and recommends VFSS when GI issues resolved, for now recommends meds crushed with puree, swallow precautions in place    Watch renal function closely, appears to be around baseline at present, will continue IVFs at lower rate    Holding long-acting insulin and covered with SSI, hypoglycemia noted yesterday but now resolved, will dc dextrose in IVFs    UA suggests possible infection, however pt has no other s/sx of UTI so will not treat at this time    SCDs for DVT prophylaxis.  Full code.  Discussed with patient, family and consulting provider (Dr. Gerard).  Anticipate discharge TBD        Grant Yin MD  [facility]  11/03/21  09:27 EDT

## 2021-11-03 NOTE — PROGRESS NOTES
"    Patient Name: Francisco Sequeira  :1935  85 y.o.      Patient Care Team:  Rubin Hinkle APRN as PCP - General (Family Medicine)  Audra Vazquez RPH as Pharmacist  Vasquez Niño PharmD as Pharmacist (Pharmacy)    Chief Complaint: follow up GIB, mechanical aortic valve    Interval History: Hr and blood pressure are stable. He has mild dyspnea on exertion. No SOA at rest. No chest pain or pressure.        Objective   Vital Signs  Temp:  [97.6 °F (36.4 °C)-98.1 °F (36.7 °C)] 98.1 °F (36.7 °C)  Heart Rate:  [61-80] 79  Resp:  [16-20] 18  BP: ()/(53-74) 102/64    Intake/Output Summary (Last 24 hours) at 11/3/2021 1525  Last data filed at 11/3/2021 1300  Gross per 24 hour   Intake 2031 ml   Output 1975 ml   Net 56 ml     Flowsheet Rows      First Filed Value   Admission Height 182.9 cm (72\") Documented at 2021 0344   Admission Weight 81.6 kg (180 lb) Documented at 2021 0344          Physical Exam:   General Appearance:    Alert, cooperative, in no acute distress   Lungs:     Clear to auscultation.  Normal respiratory effort and rate.      Heart:    Regular rhythm and normal rate, normal S1 and S2, no murmurs, gallops or rubs.     Chest Wall:    No abnormalities observed   Abdomen:     Soft, nontender, positive bowel sounds.     Extremities:   no cyanosis, clubbing or edema.  No marked joint deformities.  Adequate musculoskeletal strength.       Results Review:    Results from last 7 days   Lab Units 21  044   SODIUM mmol/L 139   POTASSIUM mmol/L 5.0   CHLORIDE mmol/L 107   CO2 mmol/L 23.2   BUN mg/dL 35*   CREATININE mg/dL 1.55*   GLUCOSE mg/dL 261*   CALCIUM mg/dL 8.2*         Results from last 7 days   Lab Units 21  1211 217 21   WBC 10*3/mm3  --   --  7.47   HEMOGLOBIN g/dL 6.5*   < > 7.9*  7.9*   HEMATOCRIT % 21.7*   < > 26.3*  26.3*   PLATELETS 10*3/mm3  --   --  151    < > = values in this interval not displayed.     Results from last 7 days   Lab " Units 11/03/21  0447 11/02/21  0324 11/01/21  0941 10/30/21  0920 10/29/21  0631 10/28/21  2008 10/28/21  1229   INR  2.29* 2.14* 1.78*   < > 1.27*  --    < >   APTT seconds  --  70.3*  --   --  115.6* 71.6*  --     < > = values in this interval not displayed.     Results from last 7 days   Lab Units 11/03/21  0447   MAGNESIUM mg/dL 2.4                   Medication Review:   atorvastatin, 40 mg, Oral, Daily  dextrose, 25 g, Intravenous, Once  digoxin, 125 mcg, Oral, Daily  ferrous sulfate, 325 mg, Oral, Daily With Breakfast  guaiFENesin, 600 mg, Oral, Q12H  insulin lispro, 0-9 Units, Subcutaneous, 4x Daily With Meals & Nightly  magnesium oxide, 400 mg, Oral, BID  metoprolol succinate XL, 25 mg, Oral, Daily  pantoprazole, 40 mg, Intravenous, Q12H  sodium bicarbonate, 1,950 mg, Oral, BID  sodium chloride, 10 mL, Intravenous, Q12H         sodium chloride, 75 mL/hr, Last Rate: 75 mL/hr (11/03/21 1239)        Assessment/Plan   1. Acute lower GIB - active bleed on tagged red blood cell scan yesterday. He is homodynamically stable. It is safer to allow INR to drift down given risk for acute prosthetic valve thrombosis with FFP or vitamin K. If he becomes hemodynamically unstable, we will reassess but continue current plan for now.  2. Atrial fibrillation  3. Mechanical aortic valve  4. Chronic anticoagulation  5. Chronic kidney disease  6. Pleural effusion s/p right thoracentesis (1650 removed 10/25)  7. History of stroke with therapeutic INR (2.2) at the time. Target increased to 3-3.5.    Discussed with Dr. Cárdenas.    LIZA Krishnamurthy  [facility]  11/03/21  15:25 EDT

## 2021-11-03 NOTE — PLAN OF CARE
Goal Outcome Evaluation:  Plan of Care Reviewed With: patient, spouse        Progress: improving  Outcome Summary: Swallow evaluation completed. Recommend clear liquid diet (thins) with meds crushed in puree. Meds should be given in 1/4 tsp bites of puree. Patient needs to be upright to eat and should take small bites and sips. Patient is okay to use straws. If patient coughs or chokes then downgrade to NPO. Patient will need a VFSS when cleared for PO/barium by GI. SLP to follow to determine when patient is ready for VFSS.

## 2021-11-04 ENCOUNTER — APPOINTMENT (OUTPATIENT)
Dept: CT IMAGING | Facility: HOSPITAL | Age: 86
End: 2021-11-04

## 2021-11-04 ENCOUNTER — TELEPHONE (OUTPATIENT)
Dept: FAMILY MEDICINE CLINIC | Facility: CLINIC | Age: 86
End: 2021-11-04

## 2021-11-04 LAB
ALBUMIN SERPL-MCNC: 2.6 G/DL (ref 3.5–5.2)
ALBUMIN/GLOB SERPL: 1 G/DL
ALP SERPL-CCNC: 76 U/L (ref 39–117)
ALT SERPL W P-5'-P-CCNC: 16 U/L (ref 1–41)
ANION GAP SERPL CALCULATED.3IONS-SCNC: 7.7 MMOL/L (ref 5–15)
AST SERPL-CCNC: 19 U/L (ref 1–40)
BH BB BLOOD EXPIRATION DATE: NORMAL
BH BB BLOOD EXPIRATION DATE: NORMAL
BH BB BLOOD TYPE BARCODE: 9500
BH BB BLOOD TYPE BARCODE: 9500
BH BB DISPENSE STATUS: NORMAL
BH BB DISPENSE STATUS: NORMAL
BH BB PRODUCT CODE: NORMAL
BH BB PRODUCT CODE: NORMAL
BH BB UNIT NUMBER: NORMAL
BH BB UNIT NUMBER: NORMAL
BILIRUB SERPL-MCNC: 1 MG/DL (ref 0–1.2)
BUN SERPL-MCNC: 40 MG/DL (ref 8–23)
BUN/CREAT SERPL: 21.2 (ref 7–25)
CALCIUM SPEC-SCNC: 8 MG/DL (ref 8.6–10.5)
CHLORIDE SERPL-SCNC: 109 MMOL/L (ref 98–107)
CO2 SERPL-SCNC: 24.3 MMOL/L (ref 22–29)
CREAT SERPL-MCNC: 1.89 MG/DL (ref 0.76–1.27)
CROSSMATCH INTERPRETATION: NORMAL
CROSSMATCH INTERPRETATION: NORMAL
DEPRECATED RDW RBC AUTO: 60.8 FL (ref 37–54)
ERYTHROCYTE [DISTWIDTH] IN BLOOD BY AUTOMATED COUNT: 20.4 % (ref 12.3–15.4)
GFR SERPL CREATININE-BSD FRML MDRD: 34 ML/MIN/1.73
GLOBULIN UR ELPH-MCNC: 2.6 GM/DL
GLUCOSE BLDC GLUCOMTR-MCNC: 172 MG/DL (ref 70–130)
GLUCOSE BLDC GLUCOMTR-MCNC: 188 MG/DL (ref 70–130)
GLUCOSE BLDC GLUCOMTR-MCNC: 188 MG/DL (ref 70–130)
GLUCOSE BLDC GLUCOMTR-MCNC: 206 MG/DL (ref 70–130)
GLUCOSE SERPL-MCNC: 151 MG/DL (ref 65–99)
HCT VFR BLD AUTO: 21.3 % (ref 37.5–51)
HCT VFR BLD AUTO: 25.3 % (ref 37.5–51)
HCT VFR BLD AUTO: 26.3 % (ref 37.5–51)
HCT VFR BLD AUTO: 29.9 % (ref 37.5–51)
HGB BLD-MCNC: 7.1 G/DL (ref 13–17.7)
HGB BLD-MCNC: 7.9 G/DL (ref 13–17.7)
HGB BLD-MCNC: 8 G/DL (ref 13–17.7)
HGB BLD-MCNC: 9.5 G/DL (ref 13–17.7)
INR PPP: 2.45 (ref 0.9–1.1)
MAGNESIUM SERPL-MCNC: 2.3 MG/DL (ref 1.6–2.4)
MCH RBC QN AUTO: 24.9 PG (ref 26.6–33)
MCHC RBC AUTO-ENTMCNC: 30.4 G/DL (ref 31.5–35.7)
MCV RBC AUTO: 81.9 FL (ref 79–97)
PLATELET # BLD AUTO: 130 10*3/MM3 (ref 140–450)
POTASSIUM SERPL-SCNC: 5 MMOL/L (ref 3.5–5.2)
PROT SERPL-MCNC: 5.2 G/DL (ref 6–8.5)
PROTHROMBIN TIME: 26.3 SECONDS (ref 11.7–14.2)
RBC # BLD AUTO: 3.21 10*6/MM3 (ref 4.14–5.8)
SODIUM SERPL-SCNC: 141 MMOL/L (ref 136–145)
UNIT  ABO: NORMAL
UNIT  ABO: NORMAL
UNIT  RH: NORMAL
UNIT  RH: NORMAL
WBC # BLD AUTO: 14.55 10*3/MM3 (ref 3.4–10.8)

## 2021-11-04 PROCEDURE — 85027 COMPLETE CBC AUTOMATED: CPT | Performed by: HOSPITALIST

## 2021-11-04 PROCEDURE — 85610 PROTHROMBIN TIME: CPT | Performed by: HOSPITALIST

## 2021-11-04 PROCEDURE — 99221 1ST HOSP IP/OBS SF/LOW 40: CPT | Performed by: STUDENT IN AN ORGANIZED HEALTH CARE EDUCATION/TRAINING PROGRAM

## 2021-11-04 PROCEDURE — 25010000002 ONDANSETRON PER 1 MG: Performed by: NURSE PRACTITIONER

## 2021-11-04 PROCEDURE — 85018 HEMOGLOBIN: CPT | Performed by: HOSPITALIST

## 2021-11-04 PROCEDURE — 36415 COLL VENOUS BLD VENIPUNCTURE: CPT | Performed by: HOSPITALIST

## 2021-11-04 PROCEDURE — 99232 SBSQ HOSP IP/OBS MODERATE 35: CPT | Performed by: NURSE PRACTITIONER

## 2021-11-04 PROCEDURE — 74176 CT ABD & PELVIS W/O CONTRAST: CPT

## 2021-11-04 PROCEDURE — 92526 ORAL FUNCTION THERAPY: CPT

## 2021-11-04 PROCEDURE — 80053 COMPREHEN METABOLIC PANEL: CPT | Performed by: HOSPITALIST

## 2021-11-04 PROCEDURE — 83735 ASSAY OF MAGNESIUM: CPT | Performed by: HOSPITALIST

## 2021-11-04 PROCEDURE — 85014 HEMATOCRIT: CPT | Performed by: HOSPITALIST

## 2021-11-04 PROCEDURE — 99232 SBSQ HOSP IP/OBS MODERATE 35: CPT | Performed by: INTERNAL MEDICINE

## 2021-11-04 PROCEDURE — 82962 GLUCOSE BLOOD TEST: CPT

## 2021-11-04 PROCEDURE — 63710000001 INSULIN LISPRO (HUMAN) PER 5 UNITS: Performed by: NURSE PRACTITIONER

## 2021-11-04 RX ORDER — PANTOPRAZOLE SODIUM 40 MG/1
40 TABLET, DELAYED RELEASE ORAL DAILY
COMMUNITY
End: 2021-11-29 | Stop reason: SDUPTHER

## 2021-11-04 RX ORDER — WARFARIN SODIUM 5 MG/1
TABLET ORAL
Qty: 115 TABLET | Refills: 1 | Status: ON HOLD | OUTPATIENT
Start: 2021-11-04 | End: 2022-04-04 | Stop reason: SDUPTHER

## 2021-11-04 RX ORDER — ATORVASTATIN CALCIUM 40 MG/1
40 TABLET, FILM COATED ORAL DAILY
Status: ON HOLD | COMMUNITY
End: 2022-05-06

## 2021-11-04 RX ORDER — DIGOXIN 125 MCG
125 TABLET ORAL
COMMUNITY
End: 2021-12-27

## 2021-11-04 RX ORDER — L. ACIDOPHILUS/BIFIDO. LONGUM 15 MG
1 CAPSULE,DELAYED RELEASE (ENTERIC COATED) ORAL 3 TIMES DAILY
COMMUNITY
End: 2022-03-23

## 2021-11-04 RX ORDER — METOPROLOL SUCCINATE 25 MG/1
25 TABLET, EXTENDED RELEASE ORAL DAILY
COMMUNITY
End: 2022-03-02 | Stop reason: HOSPADM

## 2021-11-04 RX ORDER — BLOOD-GLUCOSE METER
1 KIT MISCELLANEOUS 3 TIMES DAILY
COMMUNITY
End: 2022-03-23

## 2021-11-04 RX ORDER — PEN NEEDLE, DIABETIC 30 GX3/16"
1 NEEDLE, DISPOSABLE MISCELLANEOUS DAILY PRN
COMMUNITY
End: 2022-03-23

## 2021-11-04 RX ORDER — ASPIRIN 81 MG/1
81 TABLET ORAL DAILY
COMMUNITY
End: 2021-12-22 | Stop reason: SDUPTHER

## 2021-11-04 RX ORDER — FUROSEMIDE 40 MG/1
40 TABLET ORAL DAILY
Status: ON HOLD | COMMUNITY
End: 2021-11-16 | Stop reason: SDUPTHER

## 2021-11-04 RX ORDER — DOXYCYCLINE HYCLATE 50 MG/1
324 CAPSULE, GELATIN COATED ORAL EVERY OTHER DAY
COMMUNITY
End: 2021-11-29 | Stop reason: SDUPTHER

## 2021-11-04 RX ORDER — LANCING DEVICE
1 EACH MISCELLANEOUS 3 TIMES DAILY
COMMUNITY
End: 2022-03-23

## 2021-11-04 RX ADMIN — SODIUM CHLORIDE 100 ML/HR: 9 INJECTION, SOLUTION INTRAVENOUS at 20:58

## 2021-11-04 RX ADMIN — MAGNESIUM OXIDE 400 MG (241.3 MG MAGNESIUM) TABLET 400 MG: TABLET at 20:51

## 2021-11-04 RX ADMIN — SODIUM CHLORIDE, PRESERVATIVE FREE 10 ML: 5 INJECTION INTRAVENOUS at 21:01

## 2021-11-04 RX ADMIN — PANTOPRAZOLE SODIUM 40 MG: 40 INJECTION, POWDER, FOR SOLUTION INTRAVENOUS at 08:16

## 2021-11-04 RX ADMIN — DIGOXIN 125 MCG: 250 TABLET ORAL at 12:21

## 2021-11-04 RX ADMIN — SODIUM CHLORIDE, PRESERVATIVE FREE 10 ML: 5 INJECTION INTRAVENOUS at 08:17

## 2021-11-04 RX ADMIN — INSULIN LISPRO 2 UNITS: 100 INJECTION, SOLUTION INTRAVENOUS; SUBCUTANEOUS at 20:51

## 2021-11-04 RX ADMIN — SODIUM CHLORIDE 75 ML/HR: 9 INJECTION, SOLUTION INTRAVENOUS at 10:04

## 2021-11-04 RX ADMIN — INSULIN LISPRO 4 UNITS: 100 INJECTION, SOLUTION INTRAVENOUS; SUBCUTANEOUS at 17:32

## 2021-11-04 RX ADMIN — SODIUM BICARBONATE 1950 MG: 650 TABLET ORAL at 20:51

## 2021-11-04 RX ADMIN — GUAIFENESIN 600 MG: 600 TABLET, EXTENDED RELEASE ORAL at 21:01

## 2021-11-04 RX ADMIN — PANTOPRAZOLE SODIUM 40 MG: 40 INJECTION, POWDER, FOR SOLUTION INTRAVENOUS at 21:01

## 2021-11-04 RX ADMIN — ATORVASTATIN CALCIUM 40 MG: 20 TABLET, FILM COATED ORAL at 12:21

## 2021-11-04 RX ADMIN — ONDANSETRON 4 MG: 2 INJECTION INTRAMUSCULAR; INTRAVENOUS at 00:08

## 2021-11-04 NOTE — PLAN OF CARE
Problem: Adult Inpatient Plan of Care  Goal: Plan of Care Review  Outcome: Ongoing, Progressing   Goal Outcome Evaluation:   Vss.ST re-evaluated today, video swallow ordered.Awaiting CT abd/pelvis, General surgery consulted.H&H every 6 hrs.No c/o pain or n/v.

## 2021-11-04 NOTE — PROGRESS NOTES
[facility]  Inpatient Progress Note    Reason for Follow Up: GI bleed    Subjective     Interval History:   No further bleeding.  He continues to have abdominal pain.  Tolerating a diet.  Abdominal pain is fairly diffuse but more prominent on the right side.  CT on 11/2 showed no etiology for his pain.    Current Facility-Administered Medications:   •  acetaminophen (TYLENOL) tablet 650 mg, 650 mg, Oral, Q4H PRN **OR** acetaminophen (TYLENOL) 160 MG/5ML solution 650 mg, 650 mg, Oral, Q4H PRN **OR** acetaminophen (TYLENOL) suppository 650 mg, 650 mg, Rectal, Q4H PRN, Carmen Welch APRN  •  atorvastatin (LIPITOR) tablet 40 mg, 40 mg, Oral, Daily, Latrice Barfield APRN, 40 mg at 11/03/21 1239  •  calcium carbonate (TUMS) chewable tablet 500 mg (200 mg elemental), 2 tablet, Oral, BID PRN, Carmen Welch APRN  •  dextrose (D50W) (25 g/50 mL) IV injection 25 g, 25 g, Intravenous, Q15 Min PRN, Carmen Welch APRN  •  dextrose (D50W) (25 g/50 mL) IV injection 25 g, 25 g, Intravenous, Once, Kirk Mcdonald III, PA  •  dextrose (GLUTOSE) oral gel 15 g, 15 g, Oral, Q15 Min PRN, Carmen Welch APRN  •  digoxin (LANOXIN) tablet 125 mcg, 125 mcg, Oral, Daily, Latrice Barfield APRN, 125 mcg at 11/03/21 1239  •  diphenhydrAMINE (BENADRYL) capsule 25 mg, 25 mg, Oral, BID PRN, Latrice Barfield APRN  •  ferrous sulfate tablet 325 mg, 325 mg, Oral, Daily With Breakfast, Latrice Barfield APRN, 325 mg at 11/03/21 1240  •  glucagon (human recombinant) (GLUCAGEN DIAGNOSTIC) injection 1 mg, 1 mg, Subcutaneous, PRN, Carmen Welch APRN  •  guaiFENesin (MUCINEX) 12 hr tablet 600 mg, 600 mg, Oral, Q12H, Latrice Barfield APRN, 600 mg at 11/03/21 2125  •  HYDROcodone-acetaminophen (NORCO) 5-325 MG per tablet 1 tablet, 1 tablet, Oral, Q6H PRN, Grant Yin MD, 1 tablet at 11/02/21 2347  •  insulin lispro (ADMELOG) injection 0-9 Units, 0-9 Units,  Subcutaneous, 4x Daily With Meals & Nightly, Carmen Welch APRN, 2 Units at 11/03/21 2125  •  magnesium oxide (MAG-OX) tablet 400 mg, 400 mg, Oral, BID, Latrice Barfield APRN, 400 mg at 11/03/21 2300  •  metoprolol succinate XL (TOPROL-XL) 24 hr tablet 25 mg, 25 mg, Oral, Daily, Latrice Barfield APRN, 25 mg at 11/03/21 1240  •  nitroglycerin (NITROSTAT) SL tablet 0.4 mg, 0.4 mg, Sublingual, Q5 Min PRN, Carmen Welch APRN  •  ondansetron (ZOFRAN) tablet 4 mg, 4 mg, Oral, Q6H PRN **OR** ondansetron (ZOFRAN) injection 4 mg, 4 mg, Intravenous, Q6H PRN, Carmen Welch APRN, 4 mg at 11/04/21 0008  •  pantoprazole (PROTONIX) injection 40 mg, 40 mg, Intravenous, Q12H, Carmen Welch APRN, 40 mg at 11/04/21 0816  •  sodium bicarbonate tablet 1,950 mg, 1,950 mg, Oral, BID, Latrice Barfield APRN, 1,950 mg at 11/03/21 2300  •  sodium chloride 0.9 % flush 10 mL, 10 mL, Intravenous, Q12H, Carmen Welch APRN, 10 mL at 11/04/21 0817  •  sodium chloride 0.9 % flush 10 mL, 10 mL, Intravenous, PRN, Carmen Welch APRN  •  sodium chloride 0.9 % infusion, 75 mL/hr, Intravenous, Continuous, Grant Yin MD, Last Rate: 75 mL/hr at 11/03/21 1239, 75 mL/hr at 11/03/21 1239  Review of Systems:    Positive for abdominal pain, negative for GI bleeding, negative for fevers or chills    Objective     Vital Signs  Temp:  [97 °F (36.1 °C)-99.6 °F (37.6 °C)] 97 °F (36.1 °C)  Heart Rate:  [77-98] 78  Resp:  [18-20] 20  BP: ()/(53-76) 108/55  Body mass index is 24.41 kg/m².    Intake/Output Summary (Last 24 hours) at 11/4/2021 1004  Last data filed at 11/4/2021 0010  Gross per 24 hour   Intake 780 ml   Output 100 ml   Net 680 ml     No intake/output data recorded.     Physical Exam:   General: patient awake, alert and cooperative   Eyes: Normal lids and lashes, no scleral icterus   Neck: supple, normal ROM   Skin: warm and dry, not jaundiced   Cardiovascular: regular rhythm and rate,  no murmurs auscultated   Pulm: clear to auscultation bilaterally, regular and unlabored   Abdomen: soft, nontender, nondistended; normal bowel sounds   Extremities: no rash or edema   Psychiatric: Normal mood and behavior; memory intact     Results Review:     I reviewed the patient's new clinical results.    Results from last 7 days   Lab Units 11/04/21  0608 11/03/21  2347 11/03/21  1211 11/03/21 0447 11/03/21 0447 11/02/21  0455 11/02/21  0324   WBC 10*3/mm3 14.55*  --   --   --  7.47  --  5.78   HEMOGLOBIN g/dL 8.0* 9.5* 6.5*   < > 7.9*  7.9*   < > 7.2*   HEMATOCRIT % 26.3* 29.9* 21.7*   < > 26.3*  26.3*   < > 24.6*   PLATELETS 10*3/mm3 130*  --   --   --  151  --  151    < > = values in this interval not displayed.     Results from last 7 days   Lab Units 11/04/21  0608 11/03/21 0447 11/02/21  0324   SODIUM mmol/L 141 139 144   POTASSIUM mmol/L 5.0 5.0 4.7   CHLORIDE mmol/L 109* 107 111*   CO2 mmol/L 24.3 23.2 23.8   BUN mg/dL 40* 35* 40*   CREATININE mg/dL 1.89* 1.55* 1.53*   CALCIUM mg/dL 8.0* 8.2* 8.2*   BILIRUBIN mg/dL 1.0 0.4 0.3   ALK PHOS U/L 76 89 92   ALT (SGPT) U/L 16 17 21   AST (SGOT) U/L 19 18 23   GLUCOSE mg/dL 151* 261* 44*     Results from last 7 days   Lab Units 11/04/21  0608 11/03/21 0447 11/02/21  0324   INR  2.45* 2.29* 2.14*     No results found for: LIPASE    Radiology:  XR Chest 1 View   Final Result   Moderate right pleural effusion appears decreased from the   prior exam. Atelectasis/infiltrate is apparent in the right lower lung.   Continued follow-up recommended.         This report was finalized on 11/2/2021 4:34 PM by Dr. Chava Luke M.D.          NM GI Blood Loss   Final Result   Active GI bleeding from the right lower quadrant in the   region of either the distal ileum or the proximal ascending colon. I   discussed the case with Dr. Al Gerard at around 3:45 PM on the   day of the exam.       This report was finalized on 11/2/2021 5:51 PM by Dr. Burns  ROLA Jorge.          CT Abdomen Pelvis With Contrast   Final Result       1. No obvious etiology for the patient's GI bleeding is identified.   Patient does have colonic diverticulosis. There is no diverticulitis.   2. Large right pleural effusion. This may be slightly smaller than on   prior study. Previously identified left pleural effusion has almost   completely resolved.       Radiation dose reduction techniques were utilized, including automated   exposure control and exposure modulation based on body size.       This report was finalized on 11/2/2021 5:38 AM by Dr. Yue Frye M.D.              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 GI bleed with melena and bright red rectal bleeding; tagged scan positive with activity in the region of the cecum/terminal ileum  2. Anemia  3. Anticoagulated with Coumadin-held  4. Abdominal pain-fairly pronounced on exam      Plan:  · Abdominal pain is fairly pronounced today on exam.  He had leukocytosis this morning on labs.  Negative CT 11/2 but pain has been present only for the past 48 hours.  Going to repeat a CT this  morning.  · Recommend colonoscopy for further evaluation of bleeding, abnormal tagged red blood cell scan.   · INR continues to be elevated-discussed with [facility]/cardiology whether we can correct his coagulopathy with plasma  · Continue to follow H&H, transfuse as needed    I discussed the patients findings and my recommendations with patient and family.    Harika Elizabeth MD

## 2021-11-04 NOTE — CONSULTS
General Surgery consult    Summary:    Mr. Francisco Sequeira is a 85 y.o. year old gentleman with a GI bleed and abdominal pain.  Vitals and exam reassuring.  Will follow up results of CT scan.  No indication for urgent surgical intervention at this time.  We will continue to follow.    Chief Complaint:    Abdominal pain    History of Present Illness:    Mr. Francisco Sequeira is a 85 y.o. year old gentleman with history of mechanical valve on Coumadin with a GI bleed.  He was admitted for 8 days recently with inability to localize a GI bleed.  He underwent a colonoscopy and underwent polypectomy at this time.  He returned this week with recurrent GI bleed.  Over the last 3 days he is also developed abdominal discomfort.  He states he has some nausea but no vomiting.  He has not had a bowel movement today.    Past Medical History:   • Atrial fibrillation  • Coronary artery disease  • GERD  • Hyperlipidemia  • Cardiomyopathy  • Diabetes    Past Surgical History:    • Aortic valve replacement  • \Nephrectomy    Family History:    • Mother and brother with colon cancer    Social History:    • Denies tobacco use  • Occasional alcohol use    Allergies:   Allergies   Allergen Reactions   • Penicillins    • Percocet  [Oxycodone-Acetaminophen]    • Other Other (See Comments)     Grass, ragweed       Medications:     Current Facility-Administered Medications:   •  acetaminophen (TYLENOL) tablet 650 mg, 650 mg, Oral, Q4H PRN **OR** acetaminophen (TYLENOL) 160 MG/5ML solution 650 mg, 650 mg, Oral, Q4H PRN **OR** acetaminophen (TYLENOL) suppository 650 mg, 650 mg, Rectal, Q4H PRN, Carmen Welch APRN  •  atorvastatin (LIPITOR) tablet 40 mg, 40 mg, Oral, Daily, Latrice Barfield APRN, 40 mg at 11/04/21 1221  •  calcium carbonate (TUMS) chewable tablet 500 mg (200 mg elemental), 2 tablet, Oral, BID PRN, Carmen Welch APRN  •  dextrose (D50W) (25 g/50 mL) IV injection 25 g, 25 g, Intravenous, Q15 Min PRN, Yuri  LIZA Simms  •  dextrose (D50W) (25 g/50 mL) IV injection 25 g, 25 g, Intravenous, Once, Kirk Mcdonald III, PA  •  dextrose (GLUTOSE) oral gel 15 g, 15 g, Oral, Q15 Min PRN, Carmen Welch APRN  •  digoxin (LANOXIN) tablet 125 mcg, 125 mcg, Oral, Daily, Latrice Barfield APRN, 125 mcg at 11/04/21 1221  •  diphenhydrAMINE (BENADRYL) capsule 25 mg, 25 mg, Oral, BID PRN, Latrice Barfield APRN  •  ferrous sulfate tablet 325 mg, 325 mg, Oral, Daily With Breakfast, Latrice Barfield APRN, 325 mg at 11/03/21 1240  •  glucagon (human recombinant) (GLUCAGEN DIAGNOSTIC) injection 1 mg, 1 mg, Subcutaneous, PRN, Carmen Welch APRN  •  guaiFENesin (MUCINEX) 12 hr tablet 600 mg, 600 mg, Oral, Q12H, Latrice Barfield APRN, 600 mg at 11/03/21 2125  •  HYDROcodone-acetaminophen (NORCO) 5-325 MG per tablet 1 tablet, 1 tablet, Oral, Q6H PRN, Grant Yin MD, 1 tablet at 11/02/21 2347  •  insulin lispro (ADMELOG) injection 0-9 Units, 0-9 Units, Subcutaneous, 4x Daily With Meals & Nightly, Carmen Welch APRN, 2 Units at 11/03/21 2125  •  magnesium oxide (MAG-OX) tablet 400 mg, 400 mg, Oral, BID, Latrice Barfield APRN, 400 mg at 11/03/21 2300  •  metoprolol succinate XL (TOPROL-XL) 24 hr tablet 25 mg, 25 mg, Oral, Daily, Latrice Barfield APRN, 25 mg at 11/03/21 1240  •  nitroglycerin (NITROSTAT) SL tablet 0.4 mg, 0.4 mg, Sublingual, Q5 Min PRN, Carmen Welch APRN  •  ondansetron (ZOFRAN) tablet 4 mg, 4 mg, Oral, Q6H PRN **OR** ondansetron (ZOFRAN) injection 4 mg, 4 mg, Intravenous, Q6H PRN, Carmen Welch APRN, 4 mg at 11/04/21 0008  •  pantoprazole (PROTONIX) injection 40 mg, 40 mg, Intravenous, Q12H, Carmen Welch APRN, 40 mg at 11/04/21 0816  •  sodium bicarbonate tablet 1,950 mg, 1,950 mg, Oral, BID, Latrice Barfield APRN, 1,950 mg at 11/03/21 2300  •  sodium chloride 0.9 % flush 10 mL, 10 mL, Intravenous, Q12H, Carmen Welch APRN, 10  mL at 11/04/21 0817  •  sodium chloride 0.9 % flush 10 mL, 10 mL, Intravenous, PRN, Carmen Welch APRN  •  sodium chloride 0.9 % infusion, 100 mL/hr, Intravenous, Continuous, Grant Yin MD, Last Rate: 100 mL/hr at 11/04/21 1252, 100 mL/hr at 11/04/21 1252    Radiology/Endoscopy:    • CT abdomen pelvis from 11/2/2021 reviewed; no intra-abdominal acute abnormalities identified    Labs:    • White blood cell count 14 hemoglobin 8 platelets 130 creatinine 1.8    Review of Systems:   Influenza-like illness: no fever, no  cough, no  sore throat, no  body aches, no loss of sense of taste or smell, no known exposure to person with Covid-19.  Constitutional: Negative for fevers or chills  HENT: Negative for hearing loss or runny nose  Eyes: Negative for vision changes or scleral icterus  Respiratory: Negative for cough or shortness of breath  Cardiovascular: Negative for chest pain or heart palpitations  Gastrointestinal: Positive for abdominal pain, nausea; negative for vomiting, constipation, melena, or hematochezia  Genitourinary: Negative for hematuria or dysuria  Musculoskeletal: Negative for joint swelling or gait instability  Neurologic: Negative for tremors or seizures  Psychiatric: Negative for suicidal ideations or depression  All other systems reviewed and negative    Physical Exam:   • Constitutional: Well-developed, chronically ill-appearing, well-nourished, no acute distress  • Vital signs: Temperature 97.7 heart rate 85 respiratory rate 18 blood pressure 131/61  • Eyes: Conjunctiva normal, sclera nonicteric  • ENMT: Hearing grossly normal, oral mucosa moist  • Neck: Supple, trachea midline  • Respiratory: On room air, normal inspiratory effort  • Cardiovascular: Regular rate, no peripheral edema, no jugular venous distention  • Gastrointestinal: Soft, nontender, mildly distended  • Skin:  Warm, dry, no rash on visualized skin surfaces  • Musculoskeletal: Symmetric strength, normal  gait  • Psychiatric: Alert and oriented ×3, normal affect     MOIRA MERCADO M.D.  General and Endoscopic Surgery  [facility]    [address], [address]  [place], [place], [address]  P: [phone]  F: [phone]

## 2021-11-04 NOTE — TELEPHONE ENCOUNTER
JAMAAL FROM [facility] CALLING TO GET VERBAL ORDERS TO CONTINUE CARE AFTER PATIENT IS DISCHARGED FROM THE HOSPITAL.    PLEASE ADVISE  [phone]

## 2021-11-04 NOTE — PLAN OF CARE
Goal Outcome Evaluation:  Plan of Care Reviewed With: patient, spouse           Outcome Summary: Re-eval of swallow completed. Recommend resume previous diet of thins (clears per GI) and meds crushed with puree. Recommend VFSS when able. Recommend upright, slow rate, alert for all PO, small bites/sips, and no straws.    Patient was not wearing a face mask during this therapy encounter. Therapist used appropriate personal protective equipment including mask, eye protection and gloves.  Mask used was standard procedure mask. Appropriate PPE was worn during the entire therapy session. Hand hygiene was completed before and after therapy session. Patient is not in enhanced droplet precautions.

## 2021-11-04 NOTE — PROGRESS NOTES
Clinical Pharmacy Services: Medication History    Francisco Sequeira is a 85 y.o. male presenting to [facility] for Hypoglycemia [E16.2]  Lower GI bleed [K92.2]  H/O insulin dependent diabetes mellitus [Z86.39]  On warfarin therapy [Z79.01]    He  has a past medical history of Allergic rhinitis, Aortic valve insufficiency, Ascending aortic aneurysm (HCC), Atrial fibrillation (HCC), Bacteremia, Calcific aortic stenosis of bicuspid valve, Cardiac arrest (HCC), Cardiomyopathy (HCC), CKD (chronic kidney disease) stage 3, GFR 30-59 ml/min (HCC), Contact dermatitis due to poison ivy, Elbow fracture, Esophageal reflux, GERD (gastroesophageal reflux disease), Head injury, History of transfusion, Hyperglycemia, Hyperlipidemia, Hypertension, Kidney carcinoma (HCC), Nonischemic cardiomyopathy (HCC), Renal oncocytoma, Seasonal allergic reaction, Sinusitis, Syncope, Type 2 diabetes mellitus (HCC), and Visual field defect.    Allergies as of 11/02/2021 - Reviewed 11/02/2021   Allergen Reaction Noted    Penicillins  02/16/2016    Percocet  [oxycodone-acetaminophen]  02/16/2016    Other Other (See Comments) 02/16/2016       Medication information was obtained from: Patient's Spouse    Prior to Admission Medications       Prescriptions Last Dose Informant Patient Reported? Taking?    aspirin 81 MG EC tablet  Spouse/Significant Other Yes Yes    Take 81 mg by mouth Daily.    atorvastatin (LIPITOR) 40 MG tablet  Spouse/Significant Other Yes Yes    Take 40 mg by mouth Daily.    Blood Glucose Monitoring Suppl ([employer] Blood Glucose) kit  Spouse/Significant Other Yes Yes    1 each 3 (Three) Times a Day.    digoxin (LANOXIN) 125 MCG tablet  Spouse/Significant Other Yes Yes    Take 125 mcg by mouth Daily.    diphenhydrAMINE (BENADRYL) 25 MG tablet 11/1/2021 Spouse/Significant Other Yes Yes    Take 50 mg by mouth 2 (Two) Times a Day.    enoxaparin (LOVENOX) 80 MG/0.8ML solution syringe  Spouse/Significant Other Yes Yes    Inject  80 mg under the skin into the appropriate area as directed Every 12 (Twelve) Hours.    ferrous gluconate (FERGON) 324 MG tablet  Spouse/Significant Other Yes Yes    Take 324 mg by mouth Every Other Day.    furosemide (LASIX) 40 MG tablet  Spouse/Significant Other Yes Yes    Take 40 mg by mouth Daily.    glucose blood ([employer] Blood Glucose Test) test strip  Spouse/Significant Other Yes Yes    1 each by Other route 3 (Three) Times a Day.    guaiFENesin (MUCINEX) 600 MG 12 hr tablet 11/1/2021 Spouse/Significant Other Yes Yes    Take 600 mg by mouth Every 12 (Twelve) Hours.    Insulin Degludec (TRESIBA FLEXTOUCH SC)  Spouse/Significant Other Yes Yes    Inject 30-65 Units under the skin into the appropriate area as directed Daily As Needed. Use as directed if blood sugar gets too high    Insulin Disposable Pump (V-Go 40) kit 11/1/2021 Spouse/Significant Other No Yes    USE AS DIRECTED THREE TIMES A DAY    insulin lispro (humaLOG) 100 UNIT/ML injection 11/1/2021 Spouse/Significant Other No Yes    USE AS DIRECTED WITH V-GO PUMP    Insulin Pen Needle (Pen Needles) 32G X 4 MM misc  Spouse/Significant Other Yes Yes    1 each Daily As Needed. Use with Tresiba    Lancet Devices ([employer]oxxoger Autolet Lancing Device) misc  Spouse/Significant Other Yes Yes    1 each 3 (Three) Times a Day.    magnesium oxide (MAG-OX) 400 MG tablet 11/1/2021 Spouse/Significant Other Yes Yes    Take 400 mg by mouth 2 (Two) Times a Day.    metoprolol succinate XL (TOPROL-XL) 25 MG 24 hr tablet  Spouse/Significant Other Yes Yes    Take 25 mg by mouth Daily.    mupirocin (BACTROBAN) 2 % ointment  Spouse/Significant Other Yes Yes    Apply 1 application topically to the appropriate area as directed 3 (Three) Times a Day. Apply to back    Omega-3 Fatty Acids (FISH OIL) 1000 MG capsule capsule 11/1/2021 Spouse/Significant Other Yes Yes    Take 4,000 mg by mouth Daily With Breakfast.    pantoprazole (PROTONIX) 40 MG EC tablet  Spouse/Significant Other Yes Yes     Take 40 mg by mouth Daily.    sodium bicarbonate 650 MG tablet 11/1/2021 Spouse/Significant Other Yes Yes    Take 1,950 mg by mouth 2 (Two) Times a Day. (6 tablets total per day)    warfarin (COUMADIN) 5 MG tablet 11/1/2021 Spouse/Significant Other No Yes    TAKE 1 AND 1/2 TABLET BY MOUTH DAILY ON MONDAY, WEDNESDAY, AND FRIDAY. TAKE ONE TABLET BY MOUTH DAILY ON ALL OTHER DAYS OF THE WEEK            Medication notes: Updated testing supplies to reflect [facility] testing supplies used for blood sugar.  Updated Tresiba instructions to only use as needed per wife.  Patient's wife requesting refill for bactroban to use on patient's back.    This medication list is complete to the best of my knowledge as of 11/4/2021    Mathieu Moser, PharmD  11/4/2021 10:25 EDT

## 2021-11-04 NOTE — PROGRESS NOTES
"      [facility]         Dr Castillo Sayied   LOS: 2 days   Patient Care Team:  Rubin Hinkle APRN as PCP - General (Family Medicine)  Audra Vazquez RPH as Pharmacist  Vasquez Niño PharmD as Pharmacist (Pharmacy)    Chief Complaint: Lower GI bleed with bilateral pleural effusion status post thoracentesis transudate of effusion negative for malignancy history of aortic valve replacement A. fib    Interval History: Remains on room air with work-up of GI bleed ongoing. Continues to have significant pain and tenderness right lower quadrant    REVIEW OF SYSTEMS:   CARDIOVASCULAR: No chest pain, chest pressure or chest discomfort. No palpitations or edema.   RESPIRATORY: No shortness of breath, cough or sputum.   GASTROINTESTINAL: Abdominal pain right lower quadrant with bright red blood per rectum intermittently      Ventilator/Non-Invasive Ventilation Settings (From admission, onward)            None      On room air      Vital Signs  Temp:  [97 °F (36.1 °C)-99.6 °F (37.6 °C)] 97.7 °F (36.5 °C)  Heart Rate:  [77-98] 85  Resp:  [18-20] 18  BP: ()/(54-76) 121/61    Intake/Output Summary (Last 24 hours) at 11/4/2021 1439  Last data filed at 11/4/2021 1327  Gross per 24 hour   Intake 1780 ml   Output 50 ml   Net 1730 ml     Flowsheet Rows      First Filed Value   Admission Height 182.9 cm (72\") Documented at 11/02/2021 0344   Admission Weight 81.6 kg (180 lb) Documented at 11/02/2021 0344          Physical Exam:  Patient is examined using the personal protective equipment as per guidelines from infection control for this particular patient as enacted.  Hand hygiene was performed before and after patient interaction.   General Appearance:    Alert, cooperative, in no acute distress.  Following simple commands  Neck midline trachea, no thyromegaly   Lungs:    Diminished breath sounds right side with occasional crackles right midlung.  No labored breathing    Heart:    Regular rhythm and normal rate, " normal S1 and S2, no            murmur, no gallop, no rub, no click   Chest Wall:    No abnormalities observed   Abdomen:    Tenderness more localized to the right lower quadrant with rebound and guarding.  Hypoactive bowel sounds   Extremities:   Moves all extremities well, no edema, no cyanosis, no             redness  CNS no focal neurological deficits normal sensory exam  Skin no rashes no nodules  Musculoskeletal no cyanosis no clubbing normal range of motion     Results Review:        Results from last 7 days   Lab Units 11/04/21  0608 11/03/21 0447 11/03/21 0447 11/02/21 0324 11/02/21 0324   SODIUM mmol/L 141  --  139  --  144   POTASSIUM mmol/L 5.0  --  5.0  --  4.7   CHLORIDE mmol/L 109*  --  107  --  111*   CO2 mmol/L 24.3  --  23.2  --  23.8   BUN mg/dL 40*  --  35*  --  40*   CREATININE mg/dL 1.89*  --  1.55*  --  1.53*   GLUCOSE mg/dL 151*   < > 261*   < > 44*   CALCIUM mg/dL 8.0*  --  8.2*  --  8.2*    < > = values in this interval not displayed.         Results from last 7 days   Lab Units 11/04/21  1156 11/04/21  0608 11/03/21  2347 11/03/21  1211 11/03/21 0447 11/02/21 0455 11/02/21 0324   WBC 10*3/mm3  --  14.55*  --   --  7.47  --  5.78   HEMOGLOBIN g/dL 7.9* 8.0* 9.5*   < > 7.9*  7.9*   < > 7.2*   HEMATOCRIT % 25.3* 26.3* 29.9*   < > 26.3*  26.3*   < > 24.6*   PLATELETS 10*3/mm3  --  130*  --   --  151  --  151    < > = values in this interval not displayed.     Results from last 7 days   Lab Units 11/04/21  0608 11/03/21  0447 11/02/21  0324 10/30/21  0920 10/29/21  0631 10/28/21  2008   INR  2.45* 2.29* 2.14*   < > 1.27*  --    APTT seconds  --   --  70.3*  --  115.6* 71.6*    < > = values in this interval not displayed.         Results from last 7 days   Lab Units 11/04/21  0608   MAGNESIUM mg/dL 2.3               I reviewed the patient's new clinical results.  I personally viewed and interpreted the patient's chest x-ray.        Medication Review:   atorvastatin, 40 mg, Oral,  Daily  dextrose, 25 g, Intravenous, Once  digoxin, 125 mcg, Oral, Daily  ferrous sulfate, 325 mg, Oral, Daily With Breakfast  guaiFENesin, 600 mg, Oral, Q12H  insulin lispro, 0-9 Units, Subcutaneous, 4x Daily With Meals & Nightly  magnesium oxide, 400 mg, Oral, BID  metoprolol succinate XL, 25 mg, Oral, Daily  pantoprazole, 40 mg, Intravenous, Q12H  sodium bicarbonate, 1,950 mg, Oral, BID  sodium chloride, 10 mL, Intravenous, Q12H        sodium chloride, 100 mL/hr, Last Rate: 100 mL/hr (11/04/21 1252)        ASSESSMENT:   Bilateral pleural effusion right greater than left  Acute GI bleed suspect lower    History of kidney cancer  History of aortic valve replacement mechanical  Diabetes mellitus  Severe pulmonary hypertension on echo  A. fib  Hypertension  Left-sided weakness  Anemia acute blood loss    PLAN:  At this point we have an elderly gentleman well-known to me with bilateral pleural effusion right much greater than left transudative from previous thoracentesis.  Currently admitted with acute GI bleed with nuclear scan showing right lower quadrant acute GI bleeding and has exquisite tenderness right lower quadrant.  As far as his pleural effusion is concerned it looks stable but there is still significant amount effusion that needs to be tapped again.  Currently he is on room air and on no acute respiratory distress as such.  There is no urgency for thoracentesis at this time.  I will let his acute GI bleeding and right lower quadrant pain issues resolve and at some point when he is off anticoagulation would recommend another thoracentesis and continuing diuretics to see if no significant recurrent effusion.  Hopefully can have endoscopy soon to find out the source of bleeding. GI currently following and general surgery has been consulted  We will follow along closely      Jonathan Blackmon MD  11/04/21  14:39 EDT

## 2021-11-04 NOTE — THERAPY RE-EVALUATION
Acute Care - Speech Language Pathology   Swallow Re-Evaluation [facility]     Patient Name: Francisco Sequeira  : 1935  MRN: [id number]  Today's Date: 2021               Admit Date: 2021    Visit Dx:     ICD-10-CM ICD-9-CM   1. Lower GI bleed  K92.2 578.9   2. Hypoglycemia  E16.2 251.2   3. On warfarin therapy  Z79.01 V58.61   4. 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)   • Iron deficiency anemia   • Chronic anticoagulation   • Lower GI bleed   • History of nephrectomy   • Abnormal urinalysis   • Recurrent right pleural effusion   • Hemiparesis of left nondominant side as late effect of cerebral infarction (HCC)     Past Medical History:   Diagnosis Date   • Allergic rhinitis    • Aortic valve insufficiency    • Ascending aortic aneurysm (HCC)    • Atrial fibrillation (HCC)    • Bacteremia    • Calcific aortic stenosis of bicuspid valve    • Cardiac arrest (HCC)    • Cardiomyopathy (HCC)    • CKD (chronic kidney disease) stage 3, GFR 30-59 ml/min (HCC)    • Contact dermatitis due to poison ivy    • Elbow fracture    • Esophageal reflux    • GERD (gastroesophageal reflux disease)    • Head injury    • History of transfusion    • Hyperglycemia    • Hyperlipidemia    • Hypertension    • Kidney carcinoma (HCC)    • Nonischemic cardiomyopathy (HCC)    • Renal oncocytoma    • Seasonal allergic reaction    • Sinusitis    •  Syncope    • Type 2 diabetes mellitus (HCC)     uncontrolled   • Visual field defect      Past Surgical History:   Procedure Laterality Date   • AORTIC VALVE REPAIR/REPLACEMENT     • ASCENDING AORTIC ANEURYSM REPAIR W/ MECHANICAL AORTIC VALVE REPLACEMENT     • COLONOSCOPY N/A 10/28/2021    Procedure: COLONOSCOPY to cecum:  cold snare polyps,;  Surgeon: Dinesh Meza MD;  Location: [facility] ENDOSCOPY;  Service: Gastroenterology;  Laterality: N/A;  pre:  Iron deficiency anemia  post:  polyps, diverticulosis,    • ENDOSCOPY N/A 10/28/2021    Procedure: ESOPHAGOGASTRODUODENOSCOPY with biopsies;  Surgeon: Dinesh Meza MD;  Location: [facility] ENDOSCOPY;  Service: Gastroenterology;  Laterality: N/A;  pre:  Iron deficiency anemia  post:  duodenitis and gastritis   • EYE SURGERY  12/09/2020    cataract surgery    • NEPHRECTOMY     • OTHER SURGICAL HISTORY      elbow surgery   • PROSTATE SURGERY     • THORACENTESIS Left     diagnostic       SLP Recommendation and Plan  SLP Swallowing Diagnosis: suspected pharyngeal dysphagia (11/04/21 0900)  SLP Diet Recommendation: clear liquid diet, thin liquids (11/04/21 0900)  Recommended Precautions and Strategies: upright posture during/after eating, small bites of food and sips of liquid (11/04/21 0900)  SLP Rec. for Method of Medication Administration: meds crushed, with pudding or applesauce (11/04/21 0900)     Monitor for Signs of Aspiration: yes, notify SLP if any concerns, other (see comments) (11/04/21 0900)  Recommended Diagnostics: reassess via VFSS (MBS) (11/04/21 0900)  Swallow Criteria for Skilled Therapeutic Interventions Met: demonstrates skilled criteria (11/04/21 0900)  Anticipated Discharge Disposition (SLP): home with assist (11/04/21 0900)  Rehab Potential/Prognosis, Swallowing: good, to achieve stated therapy goals (11/04/21 0900)  Therapy Frequency (Swallow): PRN (11/04/21 0900)  Predicted Duration Therapy Intervention (Days): until discharge  (11/04/21 0900)                         Plan of Care Reviewed With: patient, spouse  Outcome Summary: Re-eval of swallow completed. Recommend resume previous diet of thins (clears per GI) and meds crushed with puree. Recommend VFSS when able. Recommend upright, slow rate, alert for all PO, small bites/sips, and no straws.      SWALLOW EVALUATION (last 72 hours)     SLP Adult Swallow Evaluation     Row Name 11/04/21 0900 11/03/21 0900                Rehab Evaluation    Document Type re-evaluation  -OC evaluation  -JJ       Subjective Information no complaints  -OC no complaints  -JJ       Patient Observations alert; cooperative; agree to therapy  -OC alert; cooperative; agree to therapy  -JJ       Patient/Family/Caregiver Comments/Observations -- Wife at bedside for evaluation  -JJ       Care Plan Review evaluation/treatment results reviewed  -OC evaluation/treatment results reviewed; care plan/treatment goals reviewed; patient/other agree to care plan  -JJ       Patient Effort good  -OC good  -JJ       Symptoms Noted During/After Treatment none  -OC none  -JJ                General Information    Patient Profile Reviewed yes  -OC yes  -JJ       Pertinent History Of Current Problem patient made NPO previous PM  -OC Pt admitted with recurrent GI bleed, AFIB, pleural effusion, CHf. Hx of CVA, mechanical aortic valve, DMII, CKD3  -JJ       Current Method of Nutrition NPO  -OC NPO  -JJ       Precautions/Limitations, Vision WFL; for purposes of eval  -OC WFL; for purposes of eval  -JJ       Precautions/Limitations, Hearing WFL; for purposes of eval  -OC WFL; for purposes of eval  -JJ       Prior Level of Function-Communication WFL  -OC WFL  -JJ       Prior Level of Function-Swallowing no diet consistency restrictions  -OC no diet consistency restrictions; other (see comments)  intermittent coughing with solids at home  -JJ       Plans/Goals Discussed with patient; spouse/S.O.; agreed upon  -OC patient; spouse/S.O.; agreed  upon  -JJ       Barriers to Rehab none identified  -OC none identified  -JJ       Patient's Goals for Discharge patient did not state  -OC return to PO diet  -JJ       Family Goals for Discharge family did not state  -OC patient able to return to PO diet  -JJ                Oral Motor Structure and Function    Dentition Assessment natural, present and adequate  -OC natural, present and adequate  -JJ       Secretion Management WNL/WFL  -OC WNL/WFL  -JJ       Mucosal Quality moist, healthy  -OC moist, healthy  -JJ       Volitional Swallow delayed  -OC delayed  -JJ       Volitional Cough WFL  -OC WFL  -JJ                Oral Musculature and Cranial Nerve Assessment    Oral Motor General Assessment generalized oral motor weakness  -OC generalized oral motor weakness  -JJ                General Eating/Swallowing Observations    Respiratory Support Currently in Use -- room air  -JJ       Eating/Swallowing Skills -- fed by SLP  -JJ       Positioning During Eating -- upright 90 degree; upright in bed  -JJ       Utensils Used -- spoon; cup; straw  -JJ       Consistencies Trialed -- pureed; ice chips; thin liquids  limited trials due to GI concerns, previously was on clear liquids  -JJ                Respiratory    Respiratory Status -- WFL  -JJ                Clinical Swallow Eval    Oral Prep Phase -- WFL  -JJ       Oral Transit -- WFL  -JJ       Oral Residue -- WFL  -JJ       Pharyngeal Phase -- suspected pharyngeal impairment  -JJ       Esophageal Phase -- unremarkable  -JJ       Clinical Swallow Evaluation Summary Patient demonstrated slight change in vocal quailty s/p trials of thin, nectar thick, and honey thick liquids. SLP unable to determine is vocal quality is related to ? penetration/aspiration versus aged voice. No overt s/s aspiration with puree. Recommend continue clears (thin) and meds crushed with puree.  -OC Patient in with recurrent GI bleed. RN said patient was okay for small amounts of PO to determine  safety.Patient observed with ice chips, thins via spoon/cup/straw, amd 1/4 tsp bites of puree. RN reported that patient choked last night with pills in puree. On this date no coughing or choking was observed. The patient's swallow was effortful, especially with puree. He also exhibited an audible swallow which could inidicate mistiming. Patient appears to be safe to resume a clear liquid diet with thins and meds crushed in puree.  -JJ                Clinical Impression    SLP Swallowing Diagnosis suspected pharyngeal dysphagia  -OC suspected pharyngeal dysphagia  -JJ       Functional Impact risk of aspiration/pneumonia  -OC risk of aspiration/pneumonia  -JJ       Rehab Potential/Prognosis, Swallowing good, to achieve stated therapy goals  -OC good, to achieve stated therapy goals  -JJ       Swallow Criteria for Skilled Therapeutic Interventions Met demonstrates skilled criteria  -OC demonstrates skilled criteria  -JJ       Plan for Continued Treatment (SLP) -- VFSS when cleared by GI  -JJ                Recommendations    Therapy Frequency (Swallow) PRN  -OC PRN  -JJ       Predicted Duration Therapy Intervention (Days) until discharge  -OC until discharge  -JJ       SLP Diet Recommendation clear liquid diet; thin liquids  -OC clear liquid diet; thin liquids  -JJ       Recommended Diagnostics reassess via VFSS (MBS)  -OC VFSS (MBS); other (see comments)  when cleared by GI  -JJ       Recommended Precautions and Strategies upright posture during/after eating; small bites of food and sips of liquid  -OC upright posture during/after eating; small bites of food and sips of liquid  -JJ       Oral Care Recommendations Oral Care BID/PRN; Oral Care before breakfast, after meals and PRN  -OC Oral Care BID/PRN; Oral Care before breakfast, after meals and PRN  -JJ       SLP Rec. for Method of Medication Administration meds crushed; with pudding or applesauce  -OC meds crushed; with pudding or applesauce  1/4 tsp bites  -JJ        Monitor for Signs of Aspiration yes; notify SLP if any concerns; other (see comments)  -OC yes; notify SLP if any concerns; other (see comments)  make patient NPO if any difficulty noted  -JJ       Anticipated Discharge Disposition (SLP) home with assist  -OC home with assist  -JJ                Swallow Goals (SLP)    Oral Nutrition/Hydration Goal Selection (SLP) oral nutrition/hydration, SLP goal 1  -OC --                Oral Nutrition/Hydration Goal 1 (SLP)    Oral Nutrition/Hydration Goal 1, SLP Tolerate least restrictive diet with no overt s/s aspiration.  -OC --       Time Frame (Oral Nutrition/Hydration Goal 1, SLP) by discharge  -OC --             User Key  (r) = Recorded By, (t) = Taken By, (c) = Cosigned By    Initials Name Effective Dates    Zahra Villegas MA, CCC-SLP 06/16/21 -     Carmen Montesinos, MS CCC-SLP 06/16/21 -                 EDUCATION  The patient has been educated in the following areas:   Dysphagia (Swallowing Impairment).        SLP GOALS     Row Name 11/04/21 0900             Oral Nutrition/Hydration Goal 1 (SLP)    Oral Nutrition/Hydration Goal 1, SLP Tolerate least restrictive diet with no overt s/s aspiration.  -OC      Time Frame (Oral Nutrition/Hydration Goal 1, SLP) by discharge  -OC            User Key  (r) = Recorded By, (t) = Taken By, (c) = Cosigned By    Initials Name Provider Type    Zahra Villegas MA,[facility]-SLP Speech and Language Pathologist              SLP Outcome Measures (last 72 hours)     SLP Outcome Measures     Row Name 11/03/21 0900             SLP Outcome Measures    Outcome Measure Used? Adult NOMS  -JJ              Adult FCM Scores    FCM Chosen Swallowing  -J      Swallowing FCM Score 5  -J            User Key  (r) = Recorded By, (t) = Taken By, (c) = Cosigned By    Initials Name Effective Dates    Carmen Montesinos, MS CCC-SLP 06/16/21 -                  Time Calculation:    Time Calculation- SLP     Row Name 11/04/21 2633              Time Calculation- SLP    SLP Start Time 0915  -OC      SLP Received On 11/04/21  -OC              Untimed Charges    95209-FX Treatment Swallow Minutes 75  -OC              Total Minutes    Untimed Charges Total Minutes 75  -OC       Total Minutes 75  -OC            User Key  (r) = Recorded By, (t) = Taken By, (c) = Cosigned By    Initials Name Provider Type    OC Zahra Arango MA,CCC-SLP Speech and Language Pathologist                Therapy Charges for Today     Code Description Service Date Service Provider Modifiers Qty    [id number]  ST TREATMENT SWALLOW 5 11/4/2021 Zahra Arango MA,CCC-SLP GN 1               Zahra Arango MA,ROLOSLP  11/4/2021

## 2021-11-04 NOTE — PROGRESS NOTES
"    Patient Name: Francisco Sequeira  :1935  85 y.o.      Patient Care Team:  Rubin Hinkle APRN as PCP - General (Family Medicine)  Audra Vazquez RPH as Pharmacist  Vasquez Niño PharmD as Pharmacist (Pharmacy)    Chief Complaint: follow up GIB, mechanical aortic valve    Interval History: INR 2.4. he is sleeping. He has a lot of abdominal pain per his wife. He is on room air. Denies shortness of breath.    Objective   Vital Signs  Temp:  [97 °F (36.1 °C)-99.6 °F (37.6 °C)] 97.7 °F (36.5 °C)  Heart Rate:  [77-98] 85  Resp:  [18-20] 18  BP: ()/(54-76) 121/61    Intake/Output Summary (Last 24 hours) at 2021 1506  Last data filed at 2021 1327  Gross per 24 hour   Intake 1780 ml   Output 50 ml   Net 1730 ml     Flowsheet Rows      First Filed Value   Admission Height 182.9 cm (72\") Documented at 2021 0344   Admission Weight 81.6 kg (180 lb) Documented at 2021 0344          Physical Exam:   General Appearance:    Alert, cooperative, in no acute distress   Lungs:     Clear to auscultation.  Normal respiratory effort and rate.      Heart:    Regular rhythm and normal rate, normal S1 and S2, no murmurs, gallops or rubs.     Chest Wall:    No abnormalities observed   Abdomen:     Soft, nontender, positive bowel sounds.     Extremities:   no cyanosis, clubbing or edema.  No marked joint deformities.  Adequate musculoskeletal strength.       Results Review:    Results from last 7 days   Lab Units 21  0608   SODIUM mmol/L 141   POTASSIUM mmol/L 5.0   CHLORIDE mmol/L 109*   CO2 mmol/L 24.3   BUN mg/dL 40*   CREATININE mg/dL 1.89*   GLUCOSE mg/dL 151*   CALCIUM mg/dL 8.0*         Results from last 7 days   Lab Units 21  1156 21  0608 21  0608   WBC 10*3/mm3  --   --  14.55*   HEMOGLOBIN g/dL 7.9*   < > 8.0*   HEMATOCRIT % 25.3*   < > 26.3*   PLATELETS 10*3/mm3  --   --  130*    < > = values in this interval not displayed.     Results from last 7 days   Lab Units " 11/04/21  0608 11/03/21  0447 11/02/21  0324 10/30/21  0920 10/29/21  0631 10/28/21  2008   INR  2.45* 2.29* 2.14*   < > 1.27*  --    APTT seconds  --   --  70.3*  --  115.6* 71.6*    < > = values in this interval not displayed.     Results from last 7 days   Lab Units 11/04/21  0608   MAGNESIUM mg/dL 2.3                   Medication Review:   atorvastatin, 40 mg, Oral, Daily  dextrose, 25 g, Intravenous, Once  digoxin, 125 mcg, Oral, Daily  ferrous sulfate, 325 mg, Oral, Daily With Breakfast  guaiFENesin, 600 mg, Oral, Q12H  insulin lispro, 0-9 Units, Subcutaneous, 4x Daily With Meals & Nightly  magnesium oxide, 400 mg, Oral, BID  metoprolol succinate XL, 25 mg, Oral, Daily  pantoprazole, 40 mg, Intravenous, Q12H  sodium bicarbonate, 1,950 mg, Oral, BID  sodium chloride, 10 mL, Intravenous, Q12H         sodium chloride, 100 mL/hr, Last Rate: 100 mL/hr (11/04/21 1252)        Assessment/Plan   1. Acute lower GIB - active bleed on tagged red blood cell scan yesterday. He is homodynamically stable. It is safer to allow INR to drift down given risk for acute prosthetic valve thrombosis with FFP or vitamin K. If he becomes hemodynamically unstable, we will reassess but continue current plan for now.  2. Atrial fibrillation  3. Mechanical aortic valve  4. Chronic anticoagulation  5. Chronic kidney disease  6. Pleural effusion s/p right thoracentesis (1650 removed 10/25)  7. History of stroke with therapeutic INR (2.2) at the time. Target increased to 3-3.5.  8. Abdominal pain , repeat CT pending.     prefer avoiding FFP if he remains hemodynamically stable. Repeat CT abd ordered for worsening abdominal pain.     Discussed with Dr. Cárdenas.    Iza Mckeon, [facility]  11/04/21  15:06 EDT

## 2021-11-05 ENCOUNTER — APPOINTMENT (OUTPATIENT)
Dept: GENERAL RADIOLOGY | Facility: HOSPITAL | Age: 86
End: 2021-11-05

## 2021-11-05 PROBLEM — D62 ACUTE POSTHEMORRHAGIC ANEMIA: Status: ACTIVE | Noted: 2021-11-05

## 2021-11-05 PROBLEM — S30.1XXA RECTUS SHEATH HEMATOMA: Status: ACTIVE | Noted: 2021-11-05

## 2021-11-05 PROBLEM — N18.32 STAGE 3B CHRONIC KIDNEY DISEASE (HCC): Status: ACTIVE | Noted: 2020-01-13

## 2021-11-05 LAB
ALBUMIN SERPL-MCNC: 2.6 G/DL (ref 3.5–5.2)
ALBUMIN/GLOB SERPL: 0.9 G/DL
ALP SERPL-CCNC: 79 U/L (ref 39–117)
ALT SERPL W P-5'-P-CCNC: 18 U/L (ref 1–41)
ANION GAP SERPL CALCULATED.3IONS-SCNC: 12.5 MMOL/L (ref 5–15)
AST SERPL-CCNC: 31 U/L (ref 1–40)
BASOPHILS # BLD AUTO: 0.04 10*3/MM3 (ref 0–0.2)
BASOPHILS NFR BLD AUTO: 0.2 % (ref 0–1.5)
BH BB BLOOD EXPIRATION DATE: NORMAL
BH BB BLOOD TYPE BARCODE: NORMAL
BH BB DISPENSE STATUS: NORMAL
BH BB PRODUCT CODE: NORMAL
BH BB UNIT NUMBER: NORMAL
BILIRUB SERPL-MCNC: 0.7 MG/DL (ref 0–1.2)
BUN SERPL-MCNC: 49 MG/DL (ref 8–23)
BUN/CREAT SERPL: 23.8 (ref 7–25)
CALCIUM SPEC-SCNC: 8 MG/DL (ref 8.6–10.5)
CHLORIDE SERPL-SCNC: 111 MMOL/L (ref 98–107)
CO2 SERPL-SCNC: 20.5 MMOL/L (ref 22–29)
CREAT SERPL-MCNC: 2.06 MG/DL (ref 0.76–1.27)
CREAT UR-MCNC: 91 MG/DL
CROSSMATCH INTERPRETATION: NORMAL
DEPRECATED RDW RBC AUTO: 57.4 FL (ref 37–54)
EOSINOPHIL # BLD AUTO: 0.06 10*3/MM3 (ref 0–0.4)
EOSINOPHIL NFR BLD AUTO: 0.4 % (ref 0.3–6.2)
ERYTHROCYTE [DISTWIDTH] IN BLOOD BY AUTOMATED COUNT: 20.5 % (ref 12.3–15.4)
GFR SERPL CREATININE-BSD FRML MDRD: 31 ML/MIN/1.73
GLOBULIN UR ELPH-MCNC: 3 GM/DL
GLUCOSE BLDC GLUCOMTR-MCNC: 174 MG/DL (ref 70–130)
GLUCOSE BLDC GLUCOMTR-MCNC: 183 MG/DL (ref 70–130)
GLUCOSE BLDC GLUCOMTR-MCNC: 199 MG/DL (ref 70–130)
GLUCOSE BLDC GLUCOMTR-MCNC: 215 MG/DL (ref 70–130)
GLUCOSE SERPL-MCNC: 184 MG/DL (ref 65–99)
HCT VFR BLD AUTO: 21.9 % (ref 37.5–51)
HCT VFR BLD AUTO: 23.2 % (ref 37.5–51)
HCT VFR BLD AUTO: 23.2 % (ref 37.5–51)
HCT VFR BLD AUTO: 25.5 % (ref 37.5–51)
HGB BLD-MCNC: 6.7 G/DL (ref 13–17.7)
HGB BLD-MCNC: 7.6 G/DL (ref 13–17.7)
HGB BLD-MCNC: 7.6 G/DL (ref 13–17.7)
HGB BLD-MCNC: 7.9 G/DL (ref 13–17.7)
IMM GRANULOCYTES # BLD AUTO: 0.09 10*3/MM3 (ref 0–0.05)
IMM GRANULOCYTES NFR BLD AUTO: 0.6 % (ref 0–0.5)
INR PPP: 2.28 (ref 0.9–1.1)
LYMPHOCYTES # BLD AUTO: 0.7 10*3/MM3 (ref 0.7–3.1)
LYMPHOCYTES NFR BLD AUTO: 4.4 % (ref 19.6–45.3)
MAGNESIUM SERPL-MCNC: 2.6 MG/DL (ref 1.6–2.4)
MCH RBC QN AUTO: 25.9 PG (ref 26.6–33)
MCHC RBC AUTO-ENTMCNC: 32.8 G/DL (ref 31.5–35.7)
MCV RBC AUTO: 78.9 FL (ref 79–97)
MONOCYTES # BLD AUTO: 1.07 10*3/MM3 (ref 0.1–0.9)
MONOCYTES NFR BLD AUTO: 6.7 % (ref 5–12)
NEUTROPHILS NFR BLD AUTO: 14.12 10*3/MM3 (ref 1.7–7)
NEUTROPHILS NFR BLD AUTO: 87.7 % (ref 42.7–76)
NRBC BLD AUTO-RTO: 0.1 /100 WBC (ref 0–0.2)
PLATELET # BLD AUTO: 265 10*3/MM3 (ref 140–450)
PMV BLD AUTO: 11.1 FL (ref 6–12)
POTASSIUM SERPL-SCNC: 5 MMOL/L (ref 3.5–5.2)
PROT SERPL-MCNC: 5.6 G/DL (ref 6–8.5)
PROTHROMBIN TIME: 24.9 SECONDS (ref 11.7–14.2)
RBC # BLD AUTO: 2.94 10*6/MM3 (ref 4.14–5.8)
SODIUM SERPL-SCNC: 144 MMOL/L (ref 136–145)
SODIUM UR-SCNC: 26 MMOL/L
UNIT  ABO: NORMAL
UNIT  RH: NORMAL
WBC # BLD AUTO: 16.08 10*3/MM3 (ref 3.4–10.8)

## 2021-11-05 PROCEDURE — 80053 COMPREHEN METABOLIC PANEL: CPT | Performed by: HOSPITALIST

## 2021-11-05 PROCEDURE — 85018 HEMOGLOBIN: CPT | Performed by: HOSPITALIST

## 2021-11-05 PROCEDURE — 83735 ASSAY OF MAGNESIUM: CPT | Performed by: HOSPITALIST

## 2021-11-05 PROCEDURE — 63710000001 INSULIN LISPRO (HUMAN) PER 5 UNITS: Performed by: NURSE PRACTITIONER

## 2021-11-05 PROCEDURE — 99232 SBSQ HOSP IP/OBS MODERATE 35: CPT | Performed by: INTERNAL MEDICINE

## 2021-11-05 PROCEDURE — 84300 ASSAY OF URINE SODIUM: CPT | Performed by: NURSE PRACTITIONER

## 2021-11-05 PROCEDURE — 86900 BLOOD TYPING SEROLOGIC ABO: CPT

## 2021-11-05 PROCEDURE — 82962 GLUCOSE BLOOD TEST: CPT

## 2021-11-05 PROCEDURE — 99232 SBSQ HOSP IP/OBS MODERATE 35: CPT | Performed by: STUDENT IN AN ORGANIZED HEALTH CARE EDUCATION/TRAINING PROGRAM

## 2021-11-05 PROCEDURE — 85014 HEMATOCRIT: CPT | Performed by: HOSPITALIST

## 2021-11-05 PROCEDURE — P9016 RBC LEUKOCYTES REDUCED: HCPCS

## 2021-11-05 PROCEDURE — 82570 ASSAY OF URINE CREATININE: CPT | Performed by: NURSE PRACTITIONER

## 2021-11-05 PROCEDURE — 36430 TRANSFUSION BLD/BLD COMPNT: CPT

## 2021-11-05 PROCEDURE — 85025 COMPLETE CBC W/AUTO DIFF WBC: CPT | Performed by: INTERNAL MEDICINE

## 2021-11-05 PROCEDURE — 85610 PROTHROMBIN TIME: CPT | Performed by: HOSPITALIST

## 2021-11-05 RX ADMIN — MAGNESIUM OXIDE 400 MG (241.3 MG MAGNESIUM) TABLET 400 MG: TABLET at 08:24

## 2021-11-05 RX ADMIN — SODIUM BICARBONATE 1950 MG: 650 TABLET ORAL at 20:19

## 2021-11-05 RX ADMIN — PANTOPRAZOLE SODIUM 40 MG: 40 INJECTION, POWDER, FOR SOLUTION INTRAVENOUS at 08:24

## 2021-11-05 RX ADMIN — SODIUM CHLORIDE 100 ML/HR: 9 INJECTION, SOLUTION INTRAVENOUS at 08:23

## 2021-11-05 RX ADMIN — INSULIN LISPRO 4 UNITS: 100 INJECTION, SOLUTION INTRAVENOUS; SUBCUTANEOUS at 12:22

## 2021-11-05 RX ADMIN — INSULIN LISPRO 2 UNITS: 100 INJECTION, SOLUTION INTRAVENOUS; SUBCUTANEOUS at 17:35

## 2021-11-05 RX ADMIN — ATORVASTATIN CALCIUM 40 MG: 20 TABLET, FILM COATED ORAL at 08:24

## 2021-11-05 RX ADMIN — SODIUM CHLORIDE 100 ML/HR: 9 INJECTION, SOLUTION INTRAVENOUS at 19:29

## 2021-11-05 RX ADMIN — DIGOXIN 125 MCG: 250 TABLET ORAL at 08:23

## 2021-11-05 RX ADMIN — SODIUM CHLORIDE, PRESERVATIVE FREE 10 ML: 5 INJECTION INTRAVENOUS at 08:24

## 2021-11-05 RX ADMIN — SODIUM BICARBONATE 1950 MG: 650 TABLET ORAL at 08:24

## 2021-11-05 RX ADMIN — MAGNESIUM OXIDE 400 MG (241.3 MG MAGNESIUM) TABLET 400 MG: TABLET at 20:19

## 2021-11-05 RX ADMIN — PANTOPRAZOLE SODIUM 40 MG: 40 INJECTION, POWDER, FOR SOLUTION INTRAVENOUS at 20:17

## 2021-11-05 RX ADMIN — INSULIN LISPRO 2 UNITS: 100 INJECTION, SOLUTION INTRAVENOUS; SUBCUTANEOUS at 22:42

## 2021-11-05 NOTE — PLAN OF CARE
Goal Outcome Evaluation:  Plan of Care Reviewed With: patient        Progress: no change  Outcome Summary: VSS. No swallow difficulties noted tonight with meds given in applesauce. Hgb 6.7. 1 unit of blood currently transfusing. NS infusing at 100ml/hr. Will continue to monitor.

## 2021-11-05 NOTE — PLAN OF CARE
Goal Outcome Evaluation:  Plan of Care Reviewed With: patient, spouse           Outcome Summary: VFSS ordered by GI 11/4/21. Scheduled this date-upon chart review by SLP, SLP with concern patient POC maybe impacted if given barium this date. Discussed with RN, RN obtained clarafication form GI. Patient should not have barium at this time. VFSS cancelled. SLP will re-schedule VFSS when ordered and patient ready for VFSS.

## 2021-11-05 NOTE — PROGRESS NOTES
"      [facility]         Dr Castillo Sayied   LOS: 3 days   Patient Care Team:  Rbuin Hinkle APRN as PCP - General (Family Medicine)  Audra Vazquez RPH as Pharmacist  Vasquez Niño PharmD as Pharmacist (Pharmacy)    Chief Complaint: Lower GI bleed with bilateral pleural effusion status post thoracentesis transudate of effusion negative for malignancy history of aortic valve replacement A. fib    Interval History: Remains on room air with work-up of GI bleed ongoing.  No change in pulmonary status today.    REVIEW OF SYSTEMS:   CARDIOVASCULAR: No chest pain, chest pressure or chest discomfort. No palpitations or edema.   RESPIRATORY: No shortness of breath, cough or sputum.   GASTROINTESTINAL: Abdominal pain right lower quadrant with bright red blood per rectum intermittently      Ventilator/Non-Invasive Ventilation Settings (From admission, onward)            None      On room air      Vital Signs  Temp:  [97.7 °F (36.5 °C)-98.2 °F (36.8 °C)] 97.8 °F (36.6 °C)  Heart Rate:  [78-99] 94  Resp:  [12-20] 16  BP: (111-138)/(54-71) 120/67    Intake/Output Summary (Last 24 hours) at 11/5/2021 1147  Last data filed at 11/5/2021 0823  Gross per 24 hour   Intake 1288.33 ml   Output 300 ml   Net 988.33 ml     Flowsheet Rows      First Filed Value   Admission Height 182.9 cm (72\") Documented at 11/02/2021 0344   Admission Weight 81.6 kg (180 lb) Documented at 11/02/2021 0344          Physical Exam:  Patient is examined using the personal protective equipment as per guidelines from infection control for this particular patient as enacted.  Hand hygiene was performed before and after patient interaction.   General Appearance:    Alert, cooperative, in no acute distress.  Following simple commands  Neck midline trachea, no thyromegaly   Lungs:    Diminished breath sounds right side with occasional crackles right midlung.  No labored breathing    Heart:    Regular rhythm and normal rate, normal S1 and S2, no          "   murmur, no gallop, no rub, no click   Chest Wall:    No abnormalities observed   Abdomen:    Tenderness more localized to the right lower quadrant with rebound and guarding.  Hypoactive bowel sounds   Extremities:   Moves all extremities well, no edema, no cyanosis, no             redness  CNS no focal neurological deficits normal sensory exam  Skin no rashes no nodules  Musculoskeletal no cyanosis no clubbing normal range of motion     Results Review:        Results from last 7 days   Lab Units 11/05/21 1000 11/04/21  0608 11/04/21  0608 11/03/21 0447 11/03/21 0447   SODIUM mmol/L 144  --  141  --  139   POTASSIUM mmol/L 5.0  --  5.0  --  5.0   CHLORIDE mmol/L 111*  --  109*  --  107   CO2 mmol/L 20.5*  --  24.3  --  23.2   BUN mg/dL 49*  --  40*  --  35*   CREATININE mg/dL 2.06*  --  1.89*  --  1.55*   GLUCOSE mg/dL 184*   < > 151*   < > 261*   CALCIUM mg/dL 8.0*  --  8.0*  --  8.2*    < > = values in this interval not displayed.         Results from last 7 days   Lab Units 11/05/21  1000 11/05/21  0026 11/04/21  1808 11/04/21  1156 11/04/21  0608 11/03/21  1211 11/03/21 0447   WBC 10*3/mm3 16.08*  --   --   --  14.55*  --  7.47   HEMOGLOBIN g/dL 7.6*  7.6* 6.7* 7.1*   < > 8.0*   < > 7.9*  7.9*   HEMATOCRIT % 23.2*  23.2* 21.9* 21.3*   < > 26.3*   < > 26.3*  26.3*   PLATELETS 10*3/mm3 265  --   --   --  130*  --  151    < > = values in this interval not displayed.     Results from last 7 days   Lab Units 11/05/21 1000 11/04/21  0608 11/03/21 0447 11/02/21  0324 11/02/21  0324   INR  2.28* 2.45* 2.29*   < > 2.14*   APTT seconds  --   --   --   --  70.3*    < > = values in this interval not displayed.         Results from last 7 days   Lab Units 11/05/21  1000   MAGNESIUM mg/dL 2.6*               I reviewed the patient's new clinical results.  I personally viewed and interpreted the patient's chest x-ray.        Medication Review:   atorvastatin, 40 mg, Oral, Daily  dextrose, 25 g, Intravenous,  Once  digoxin, 125 mcg, Oral, Daily  ferrous sulfate, 325 mg, Oral, Daily With Breakfast  guaiFENesin, 600 mg, Oral, Q12H  insulin lispro, 0-9 Units, Subcutaneous, 4x Daily With Meals & Nightly  magnesium oxide, 400 mg, Oral, BID  metoprolol succinate XL, 25 mg, Oral, Daily  pantoprazole, 40 mg, Intravenous, Q12H  sodium bicarbonate, 1,950 mg, Oral, BID        sodium chloride, 100 mL/hr, Last Rate: 100 mL/hr (11/05/21 0823)        ASSESSMENT:   Bilateral pleural effusion right greater than left  Acute GI bleed suspect lower  History of kidney cancer  History of aortic valve replacement mechanical  Diabetes mellitus  Severe pulmonary hypertension on echo  A. fib  Hypertension  Left-sided weakness  Anemia acute blood loss    PLAN:  Clinically remained stable pulmonary wise  We will need repeat thoracentesis once GI issues stable and resolved.  No urgency for thoracentesis at this time.  Effusion appears to be likely cardiac with previous thoracentesis consistent with transudate.  Will need to resume diuretics at some point.  Currently evaluated by GI and general surgery for etiology of GI bleed  Currently off anticoagulation due to concerns of bleeding  Rising creatinine noted.  Monitor.  May need nephrology to see  Discussed plan of care with the wife  Mobilize and ambulate        Jonathan Blackmon MD  11/05/21  11:47 EDT

## 2021-11-05 NOTE — PROGRESS NOTES
LOS: 3 days   Patient Care Team:  Rubin Hinkle APRN as PCP - General (Family Medicine)  Audra Vazquez RODDY as Pharmacist  Vasquez Niño PharmD as Pharmacist (Pharmacy)    Chief Complaint: Follow-up for mechanical aortic valve replacement, persistent atrial fibrillation lower GI bleed.    Interval History: INR is slowly drifting down.  No chest pain or shortness of breath.  He feels better this morning.    Vital Signs:  Temp:  [97.7 °F (36.5 °C)-98.5 °F (36.9 °C)] 97.7 °F (36.5 °C)  Heart Rate:  [] 102  Resp:  [12-20] 18  BP: (118-138)/(54-71) 123/68    Intake/Output Summary (Last 24 hours) at 11/5/2021 1940  Last data filed at 11/5/2021 1929  Gross per 24 hour   Intake 2288.33 ml   Output 450 ml   Net 1838.33 ml       Physical Exam:   General Appearance:    No acute distress, alert and oriented x4   Lungs:     Clear to auscultation bilaterally     Heart:    Irregularly irregular rhythm with normal rate.  Mechanical S2.  II/VI SM throughout precordium.   Abdomen:     Soft, right lower quadrant hematoma, nondistended.    Extremities:   No clubbing, cyanosis, or edema.     Results Review:    Results from last 7 days   Lab Units 11/05/21  1000   SODIUM mmol/L 144   POTASSIUM mmol/L 5.0   CHLORIDE mmol/L 111*   CO2 mmol/L 20.5*   BUN mg/dL 49*   CREATININE mg/dL 2.06*   GLUCOSE mg/dL 184*   CALCIUM mg/dL 8.0*         Results from last 7 days   Lab Units 11/05/21  1758 11/05/21  1000 11/05/21  1000   WBC 10*3/mm3  --   --  16.08*   HEMOGLOBIN g/dL 7.9*   < > 7.6*  7.6*   HEMATOCRIT % 25.5*   < > 23.2*  23.2*   PLATELETS 10*3/mm3  --   --  265    < > = values in this interval not displayed.     Results from last 7 days   Lab Units 11/05/21  1000 11/04/21  0608 11/03/21  0447 11/02/21  0324 11/02/21  0324   INR  2.28* 2.45* 2.29*   < > 2.14*   APTT seconds  --   --   --   --  70.3*    < > = values in this interval not displayed.         Results from last 7 days   Lab Units 11/05/21  1000   MAGNESIUM mg/dL 2.6*            I reviewed the patient's new clinical results.        Assessment:  1.  Acute blood loss anemia  2.  Lower GI bleed with hematochezia - tagged scan positive in the cecum and terminal ileum area  3.  Status post mechanical aortic valve replacement  4.  Chronic anticoagulation with goal INR of 3-3.5  5.  Right rectus sheath hematoma  6.  Persistent atrial fibrillation  7.  History of CVA with residual left-sided weakness  8.  Recurrent right pleural effusion, status post large thoracentesis on 10/25/2021 (1650 cc) - transudative  9.  BYRON with Stage IIIb chronic kidney disease   10.  History of renal cell carcinoma and right nephrectomy  11. Diabetes    Plan:  -His INR continues to slowly drift down.  Hopefully, it will continue to drift downwards.  His INR is currently 2.28.  Again, with a mechanical aortic valve and history of stroke, I would not use FFP or vitamin K if at all possible.    -Unfortunately, he has had a stroke on a therapeutic INR in the past with significant left-sided sequelae.  This obviously places him at high risk for stroke again, although he is likely actively bleeding, and this outweighs the stroke risk in this situation.  Total of 5 units of packed red blood cells thus far.    -Colonoscopy when INR is appropriate.    -Continue Toprol-XL and digoxin for atrial fibrillation.  His last digoxin level was 1.1 on 10/23/2021.  I am going to check another level tomorrow morning in light of his kidney dysfunction.    -Abdominal pain is likely from the rectus sheath hematoma.  Hopefully this will also improve over time.    Jimenez Mast MD  11/05/21  19:40 EDT

## 2021-11-05 NOTE — PROGRESS NOTES
Name: Francisco Sequeira ADMIT: 2021   : 1935  PCP: Rubin Hinkle APRN    MRN: [id number] LOS: 3 days   AGE/SEX: 85 y.o. male  ROOM: E4/     Subjective   Subjective    Says he feels much better today.  Less pain.  Tolerating liquids.  No new complaints otherwise.    Review of Systems   Constitutional: Positive for fatigue.   Gastrointestinal: Positive for abdominal pain.   Genitourinary: Positive for decreased urine volume. Negative for difficulty urinating.        Objective   Objective   Vital Signs  Temp:  [97.7 °F (36.5 °C)-98.2 °F (36.8 °C)] 97.8 °F (36.6 °C)  Heart Rate:  [78-99] 94  Resp:  [12-20] 16  BP: (111-138)/(54-71) 120/67  SpO2:  [94 %-98 %] 95 %  on   ;   Device (Oxygen Therapy): room air  Body mass index is 24.41 kg/m².  Physical Exam  Vitals and nursing note reviewed. Exam conducted with a chaperone present (wife).   Constitutional:       General: He is not in acute distress.     Appearance: He is ill-appearing. He is not toxic-appearing or diaphoretic.   HENT:      Head: Normocephalic and atraumatic.   Eyes:      General: No scleral icterus.     Extraocular Movements: Extraocular movements intact.      Conjunctiva/sclera: Conjunctivae normal.   Cardiovascular:      Rate and Rhythm: Normal rate. Rhythm irregularly irregular.      Pulses: Normal pulses.      Comments: Mechanical s2  Pulmonary:      Effort: Pulmonary effort is normal. No respiratory distress.      Breath sounds: Normal breath sounds. No wheezing or rales.      Comments: Decreased BS right base  Abdominal:      General: Bowel sounds are normal. There is no distension.      Palpations: Abdomen is soft.      Tenderness: There is abdominal tenderness (RLQ). There is guarding (voluntary). There is no rebound.   Musculoskeletal:         General: No swelling or deformity. Normal range of motion.      Cervical back: Neck supple. No rigidity.   Lymphadenopathy:      Cervical: No cervical adenopathy.   Skin:     General:  Skin is warm and dry.      Capillary Refill: Capillary refill takes less than 2 seconds.      Coloration: Skin is pale. Skin is not jaundiced.      Findings: No rash.   Neurological:      Mental Status: He is alert and oriented to person, place, and time. Mental status is at baseline.      Cranial Nerves: No cranial nerve deficit.      Motor: Weakness (very mild chronic left weakness, at baseline) present.      Coordination: Coordination normal.   Psychiatric:         Mood and Affect: Mood normal.         Behavior: Behavior normal.         Results Review     I reviewed the patient's new clinical results.  Results from last 7 days   Lab Units 11/05/21  0026 11/04/21  1808 11/04/21  1156 11/04/21  0608 11/03/21  1211 11/03/21  0447 11/02/21  0455 11/02/21  0324 11/01/21  0941 11/01/21  0941   WBC 10*3/mm3  --   --   --  14.55*  --  7.47  --  5.78  --  6.29   HEMOGLOBIN g/dL 6.7* 7.1* 7.9* 8.0*   < > 7.9*  7.9*   < > 7.2*   < > 8.8*   PLATELETS 10*3/mm3  --   --   --  130*  --  151  --  151  --  186    < > = values in this interval not displayed.     Results from last 7 days   Lab Units 11/04/21  0608 11/03/21 0447 11/02/21 0324 11/01/21  0941   SODIUM mmol/L 141 139 144 141   POTASSIUM mmol/L 5.0 5.0 4.7 4.8   CHLORIDE mmol/L 109* 107 111* 105   CO2 mmol/L 24.3 23.2 23.8 26.1   BUN mg/dL 40* 35* 40* 40*   CREATININE mg/dL 1.89* 1.55* 1.53* 1.60*   GLUCOSE mg/dL 151* 261* 44* 105*   EGFR IF NONAFRICN AM mL/min/1.73 34* 43* 44* 41*     Results from last 7 days   Lab Units 11/04/21  0608 11/03/21 0447 11/02/21  0324   ALBUMIN g/dL 2.60* 3.00* 3.30*   BILIRUBIN mg/dL 1.0 0.4 0.3   ALK PHOS U/L 76 89 92   AST (SGOT) U/L 19 18 23   ALT (SGPT) U/L 16 17 21     Results from last 7 days   Lab Units 11/04/21  0608 11/03/21  0447 11/02/21  0324 11/01/21  0941   CALCIUM mg/dL 8.0* 8.2* 8.2* 8.6   ALBUMIN g/dL 2.60* 3.00* 3.30*  --    MAGNESIUM mg/dL 2.3 2.4  --   --      Results from last 7 days   Lab Units 11/02/21  0324    PROCALCITONIN ng/mL 0.12     Results from last 7 days   Lab Units 11/04/21  0608 11/03/21  0447 11/02/21  0324 11/01/21  0941 11/01/21  0000 10/31/21  0000 10/30/21  0920   PROTIME Seconds 26.3* 25.0* 23.7* 20.5*  --   --  15.6*   INR  2.45* 2.29* 2.14* 1.78* 1.80 1.40 1.27*     Glucose   Date/Time Value Ref Range Status   11/05/2021 0549 174 (H) 70 - 130 mg/dL Final     Comment:     Meter: [id number] : [id number] Gomez Bertha NA   11/04/2021 2029 188 (H) 70 - 130 mg/dL Final     Comment:     Meter: [id number] : [id number] Gomez Bertha NA   11/04/2021 1626 206 (H) 70 - 130 mg/dL Final     Comment:     Meter: [id number] : [id number] Cam Sarah NA   11/04/2021 1115 188 (H) 70 - 130 mg/dL Final     Comment:     Meter: [id number] : [id number] Dupont Jenise NA   11/04/2021 0543 172 (H) 70 - 130 mg/dL Final     Comment:     Meter: [id number] : [id number] Gomez Bertha NA   11/03/2021 2026 171 (H) 70 - 130 mg/dL Final     Comment:     Meter: [id number] : [id number] Gomez Bertha NA   11/03/2021 1633 175 (H) 70 - 130 mg/dL Final     Comment:     Meter: [id number] : [id number] Isaias GONZALEZ [facility]       CT Abdomen Pelvis Without Contrast  11/4  CONCLUSION:  1. Right-sided pleural effusion appears slightly larger compared to the  previous examination, new small left-sided pleural effusion has  developed. There is diffuse body wall edema and cardiac enlargement.  Small amount of free fluid is demonstrated within the peritoneal cavity.  2. Sizable bilobed collection has developed within the right rectus  abdominis muscle, consistent with hematoma. See above dimensions.  3. Diverticulosis of the colon, subtle pericolonic induration about the  mid sigmoid suggests possible diverticulitis.  4. Urine demonstrates increased density, perhaps related to residual  contrast from previous CT or hemorrhage. Correlation with urinalysis  advised.  5. Gallstones.        Scheduled Medications  atorvastatin, 40 mg,  "Oral, Daily  dextrose, 25 g, Intravenous, Once  digoxin, 125 mcg, Oral, Daily  ferrous sulfate, 325 mg, Oral, Daily With Breakfast  guaiFENesin, 600 mg, Oral, Q12H  insulin lispro, 0-9 Units, Subcutaneous, 4x Daily With Meals & Nightly  magnesium oxide, 400 mg, Oral, BID  metoprolol succinate XL, 25 mg, Oral, Daily  pantoprazole, 40 mg, Intravenous, Q12H  sodium bicarbonate, 1,950 mg, Oral, BID    Infusions  sodium chloride, 100 mL/hr, Last Rate: 100 mL/hr (11/05/21 0823)    Diet  Diet Clear Liquid; Thin       Assessment/Plan     Active Hospital Problems    Diagnosis  POA   • **Lower GI bleed [K92.2]  Yes   • Rectus sheath hematoma [S30.1XXA]  No   • Acute posthemorrhagic anemia [D62]  Yes   • History of nephrectomy [Z90.5]  Not Applicable   • Abnormal urinalysis [R82.90]  Yes   • Recurrent right pleural effusion [J90]  Yes   • 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       84 yo gentleman with h/o DM2, AFib, Mech AVR (on warfarin), HTN, CKD3, h/o RCC s/p right nephrectomy and h/o CVA with mild residual left hemiparesis, admitted here from 10/23 to 10/30 for \"obscure\" GI bleed and bilateral pleural effusions (transudate), who presented to ER with c/o rectal bleeding and generalized weakness. Found to have drop in Hgb from 9.3 to 7.2. Stool grossly bloody in ER.     Morning labs still pending.  Hemoglobin down to 6.7 last night and order was given for single unit blood transfusion.  He " remained stable hemodynamically.  Discussed with GI who is following for suspected acute GI bleeding.  Will need colonoscopy.  Cardiology following and continues to suggest allowing INR to drift down.  Discussed with wife at bedside.      Renal function relatively stable, follows Dr. Dominguez.  Potassium is generous.  Continue monitoring.  Continue IV fluids for now  Pulmonology following, may need thoracentesis.  ST recommends VFSS when GI issues resolved.  BS relatively stable on SSI.      SCDs for DVT prophylaxis.  Full code.  Discussed with patient, family and consulting provider.  Anticipate discharge home with  vs SNU facility next week.      Santos Guzman MD  [facility]  11/05/21  10:08 EDT      ADDENDUM  Morning labs have returned with continuing to rise creatinine.  Will ask his nephrologist, Dr. Dominguez, to evaluate.  Hemoglobin up appropriately following single unit transfusion, continue close monitoring.    Electronically signed by Santos Guzman MD, 11/05/21, 12:25 PM EDT.

## 2021-11-05 NOTE — PROGRESS NOTES
[facility]  Inpatient Progress Note    Reason for Follow Up: GI bleed    Subjective     Interval History:   CT of the abdomen yesterday with a right rectus sheath hematoma.  Hemoglobin dropped to 6.7 overnight requiring 1 unit of packed red blood cells.  INR this morning is pending.  He has not had any further blood from his rectum or bowel movement.    Current Facility-Administered Medications:   •  acetaminophen (TYLENOL) tablet 650 mg, 650 mg, Oral, Q4H PRN **OR** acetaminophen (TYLENOL) 160 MG/5ML solution 650 mg, 650 mg, Oral, Q4H PRN **OR** acetaminophen (TYLENOL) suppository 650 mg, 650 mg, Rectal, Q4H PRN, Carmen Weclh APRN  •  atorvastatin (LIPITOR) tablet 40 mg, 40 mg, Oral, Daily, Latrice Barfield APRN, 40 mg at 11/05/21 0824  •  calcium carbonate (TUMS) chewable tablet 500 mg (200 mg elemental), 2 tablet, Oral, BID PRN, Carmen Welch APRN  •  dextrose (D50W) (25 g/50 mL) IV injection 25 g, 25 g, Intravenous, Q15 Min PRN, Carmen Welch APRN  •  dextrose (D50W) (25 g/50 mL) IV injection 25 g, 25 g, Intravenous, Once, Kirk Mcdonald III, PA  •  dextrose (GLUTOSE) oral gel 15 g, 15 g, Oral, Q15 Min PRN, Carmen Welch APRN  •  digoxin (LANOXIN) tablet 125 mcg, 125 mcg, Oral, Daily, Latrice Barfield APRN, 125 mcg at 11/05/21 0823  •  diphenhydrAMINE (BENADRYL) capsule 25 mg, 25 mg, Oral, BID PRN, Latrice Barfield APRN  •  ferrous sulfate tablet 325 mg, 325 mg, Oral, Daily With Breakfast, Latrice Barfield APRN, 325 mg at 11/03/21 1240  •  glucagon (human recombinant) (GLUCAGEN DIAGNOSTIC) injection 1 mg, 1 mg, Subcutaneous, PRN, Carmen Weclh APRN  •  guaiFENesin (MUCINEX) 12 hr tablet 600 mg, 600 mg, Oral, Q12H, Latrice Barfield, APRN, 600 mg at 11/04/21 2101  •  HYDROcodone-acetaminophen (NORCO) 5-325 MG per tablet 1 tablet, 1 tablet, Oral, Q6H PRN, Grant Yin MD, 1 tablet at 11/02/21 2341  •  insulin lispro  (ADMELOG) injection 0-9 Units, 0-9 Units, Subcutaneous, 4x Daily With Meals & Nightly, Carmen Welch APRN, 2 Units at 11/04/21 2051  •  magnesium oxide (MAG-OX) tablet 400 mg, 400 mg, Oral, BID, Latrice Barfield APRN, 400 mg at 11/05/21 0824  •  metoprolol succinate XL (TOPROL-XL) 24 hr tablet 25 mg, 25 mg, Oral, Daily, Latrice Barfield APRN, 25 mg at 11/03/21 1240  •  nitroglycerin (NITROSTAT) SL tablet 0.4 mg, 0.4 mg, Sublingual, Q5 Min PRN, Carmen Welch APRN  •  ondansetron (ZOFRAN) tablet 4 mg, 4 mg, Oral, Q6H PRN **OR** ondansetron (ZOFRAN) injection 4 mg, 4 mg, Intravenous, Q6H PRN, Carmen Welch APRN, 4 mg at 11/04/21 0008  •  pantoprazole (PROTONIX) injection 40 mg, 40 mg, Intravenous, Q12H, Carmen Welch APRN, 40 mg at 11/05/21 0824  •  sodium bicarbonate tablet 1,950 mg, 1,950 mg, Oral, BID, Latrice Barfield APRN, 1,950 mg at 11/05/21 0824  •  sodium chloride 0.9 % flush 10 mL, 10 mL, Intravenous, Q12H, Carmen Welch APRN, 10 mL at 11/05/21 0824  •  sodium chloride 0.9 % flush 10 mL, 10 mL, Intravenous, PRN, Carmen Welch APRN  •  sodium chloride 0.9 % infusion, 100 mL/hr, Intravenous, Continuous, Grant Yin MD, Last Rate: 100 mL/hr at 11/05/21 0823, 100 mL/hr at 11/05/21 0823  Review of Systems:    Positive for abdominal pain, negative for vomiting, negative for fevers or chills.    Objective     Vital Signs  Temp:  [97.7 °F (36.5 °C)-98.2 °F (36.8 °C)] 97.8 °F (36.6 °C)  Heart Rate:  [78-99] 94  Resp:  [12-20] 16  BP: (111-138)/(54-71) 120/67  Body mass index is 24.41 kg/m².    Intake/Output Summary (Last 24 hours) at 11/5/2021 0910  Last data filed at 11/5/2021 0823  Gross per 24 hour   Intake 2288.33 ml   Output 300 ml   Net 1988.33 ml     I/O this shift:  In: 1000 [I.V.:1000]  Out: -      Physical Exam:   General: patient awake, alert and cooperative   Eyes: Normal lids and lashes, no scleral icterus   Neck: supple, normal ROM   Skin:  warm and dry, not jaundiced   Cardiovascular: regular rhythm and rate    Pulm:   regular and unlabored   Abdomen: soft, abdomen tender and firm especially in the right lower abdomen, nondistended; normal bowel sounds   Psychiatric: Normal mood and behavior; memory intact     Results Review:     I reviewed the patient's new clinical results.    Results from last 7 days   Lab Units 11/05/21  0026 11/04/21  1808 11/04/21  1156 11/04/21  0608 11/04/21  0608 11/03/21  1211 11/03/21 0447 11/02/21  0455 11/02/21  0324   WBC 10*3/mm3  --   --   --   --  14.55*  --  7.47  --  5.78   HEMOGLOBIN g/dL 6.7* 7.1* 7.9*   < > 8.0*   < > 7.9*  7.9*   < > 7.2*   HEMATOCRIT % 21.9* 21.3* 25.3*   < > 26.3*   < > 26.3*  26.3*   < > 24.6*   PLATELETS 10*3/mm3  --   --   --   --  130*  --  151  --  151    < > = values in this interval not displayed.     Results from last 7 days   Lab Units 11/04/21  0608 11/03/21 0447 11/02/21 0324   SODIUM mmol/L 141 139 144   POTASSIUM mmol/L 5.0 5.0 4.7   CHLORIDE mmol/L 109* 107 111*   CO2 mmol/L 24.3 23.2 23.8   BUN mg/dL 40* 35* 40*   CREATININE mg/dL 1.89* 1.55* 1.53*   CALCIUM mg/dL 8.0* 8.2* 8.2*   BILIRUBIN mg/dL 1.0 0.4 0.3   ALK PHOS U/L 76 89 92   ALT (SGPT) U/L 16 17 21   AST (SGOT) U/L 19 18 23   GLUCOSE mg/dL 151* 261* 44*     Results from last 7 days   Lab Units 11/04/21  0608 11/03/21 0447 11/02/21  0324   INR  2.45* 2.29* 2.14*     No results found for: LIPASE    Radiology:  CT Abdomen Pelvis Without Contrast   Final Result      XR Chest 1 View   Final Result   Moderate right pleural effusion appears decreased from the   prior exam. Atelectasis/infiltrate is apparent in the right lower lung.   Continued follow-up recommended.         This report was finalized on 11/2/2021 4:34 PM by Dr. Chava Luke M.D.          NM GI Blood Loss   Final Result   Active GI bleeding from the right lower quadrant in the   region of either the distal ileum or the proximal ascending colon. I    discussed the case with Dr. Al Gerard at around 3:45 PM on the   day of the exam.       This report was finalized on 11/2/2021 5:51 PM by Dr. Grant Jorge M.D.          CT Abdomen Pelvis With Contrast   Final Result       1. No obvious etiology for the patient's GI bleeding is identified.   Patient does have colonic diverticulosis. There is no diverticulitis.   2. Large right pleural effusion. This may be slightly smaller than on   prior study. Previously identified left pleural effusion has almost   completely resolved.       Radiation dose reduction techniques were utilized, including automated   exposure control and exposure modulation based on body size.       This report was finalized on 11/2/2021 5:38 AM by Dr. Yue Frye M.D.          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 GI bleed with melena and bright red rectal bleeding; tagged scan positive with activity in the region of the cecum/terminal ileum  2. Anemia  3. Anticoagulated with Coumadin-held  4. Abdominal pain-fairly pronounced on exam  5. History of  mechanical aortic valve      Plan:  · Awaiting post hemoglobin H&H-transfuse additional units if needed  · INR pending this morning as well.  Discussed with Dr. Humphrey Cárdenas yesterday-plan is to let INR drift down due to mechanical valves.  We will determine timing of his colonoscopy to evaluate GI bleeding and his abnormal tagged red blood cell scan depending on his INR.  · I think at this point the drop in his hemoglobin is due to the rectus hematoma    I discussed the patients findings and my recommendations with patient and family.    Harika Elizabeth MD

## 2021-11-05 NOTE — PROGRESS NOTES
"General Surgery Progress Note    Summary:  Mr. Francisco Sequeira is a 85 y.o. year old gentleman with GI bleeding and rectus sheath hematoma.  Anticoagulation currently on hold.  Responded to transfusion.  No indication for surgical intervention of rectus sheath hematoma.  Continue observation.    Chief Complaint: Abdominal pain    Interval Events: No acute events overnight.  Received 1 unit of packed red blood cells.  Feels better overall.  Tolerating clear liquid diet.    Vitals: /59 (BP Location: Right arm, Patient Position: Lying)   Pulse 94   Temp 98.5 °F (36.9 °C) (Oral)   Resp 16   Ht 182.9 cm (72\")   Wt 81.6 kg (180 lb)   SpO2 98%   BMI 24.41 kg/m²     GENERAL: alert, well appearing, and in no distress  HEENT: normocephalic, atraumatic, moist mucous membranes, clear sclerae   CHEST: no increased work of breathing, symmetric air entry  CARDIAC: regular rate and rhythm    ABDOMEN: Soft, nondistended, mildly tender, firm area in the right midabdomen  EXTREMITIES: no cyanosis, clubbing, or edema   SKIN: Warm and moist, no rashes    Labs:  Results from last 7 days   Lab Units 11/05/21  1000 11/05/21  0026 11/04/21  1808 11/04/21  1156 11/04/21  0608 11/03/21  1211 11/03/21  0447 11/01/21  0941 10/30/21  0920   WBC 10*3/mm3 16.08*  --   --   --  14.55*  --  7.47   < > 5.17  4.85   HEMOGLOBIN g/dL 7.6*  7.6* 6.7* 7.1*   < > 8.0*   < > 7.9*  7.9*   < > 9.3*  9.1*   HEMATOCRIT % 23.2*  23.2* 21.9* 21.3*   < > 26.3*   < > 26.3*  26.3*   < > 32.5*  31.8*   PLATELETS 10*3/mm3 265  --   --   --  130*  --  151   < > 161  162   MONOCYTES % %  --   --   --   --   --   --   --   --  6.0    < > = values in this interval not displayed.     Results from last 7 days   Lab Units 11/05/21  1000 11/04/21  0608 11/03/21  0447   SODIUM mmol/L 144 141 139   POTASSIUM mmol/L 5.0 5.0 5.0   CHLORIDE mmol/L 111* 109* 107   CO2 mmol/L 20.5* 24.3 23.2   BUN mg/dL 49* 40* 35*   CREATININE mg/dL 2.06* 1.89* 1.55*   CALCIUM " mg/dL 8.0* 8.0* 8.2*   BILIRUBIN mg/dL 0.7 1.0 0.4   ALK PHOS U/L 79 76 89   ALT (SGPT) U/L 18 16 17   AST (SGOT) U/L 31 19 18   GLUCOSE mg/dL 184* 151* 261*     Results from last 7 days   Lab Units 11/05/21  1000 11/04/21  0608 11/03/21  0447 11/02/21  0324 11/02/21  0324   INR  2.28* 2.45* 2.29*   < > 2.14*   APTT seconds  --   --   --   --  70.3*    < > = values in this interval not displayed.       MOIRA MERCADO MD  General and Endoscopic Surgery  [facility]    [address] [place] [address], [address]  [place], [place], [address]  P: [phone]  F: [phone]

## 2021-11-05 NOTE — CONSULTS
[facility] Consult Note        Reason for Consultation: BYRON on CKD 3    Subjective     Chief complaint   Chief Complaint   Patient presents with   • Black or Bloody Stool       History of present illness:   85-year-old man who follows with Dr. Jeff Dominguez of our group for CKD3 with baseline creatinine 1.4 - 1.7, admitted 11/2 for rectal bleeding. 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     He was recently admitted 10/23 - 10/30/21 for weakness and found to have iron deficiency anemia and right > left pleural effusions. Thoracentesis revealed transudative effusion. EGD showed gastritis and duodenal erosion. He was discharged home on oral iron, pantoprazole, and 40 mg lasix daily.     He presented to ED on 11/2/21 with rectal bleeding; hgb was 7.2 and he was transfused with 5 units PRBCs. His warfarin is being held. A tagged RBC scan suggests bleed in RLQ consistent with distal ileum or proximal colon. Colonoscopy is planned once INR is sub-therapeutic. CT abd/pelvis today showed new hematoma of the right rectus abdominis muscle. Right thoracentesis is planned for enlarging effusion once he is more stable. SCR on admission was 1.5 and is 2.1 today; we've been asked to manage his acute kidney injury.     · Has abdominal pain, RLQ > LLQ, exacerbated by movement; but diet has been mediocre, but no N/V  · Urine volumes low today, but otherwise no dysuria or hematuria  · +flatus today  · No shortness of breath at rest; no chest pain  · Stable leg swelling    Review of Systems:   14 point review of systems is otherwise negative except for mentioned above on HPI.        Past Medical History:   Diagnosis Date   • Allergic rhinitis    • Aortic valve insufficiency    • Ascending aortic aneurysm (HCC)    • Atrial fibrillation (HCC)    • Bacteremia    • Calcific  aortic stenosis of bicuspid valve    • Cardiac arrest (HCC)    • Cardiomyopathy (HCC)    • CKD (chronic kidney disease) stage 3, GFR 30-59 ml/min (HCC)    • Contact dermatitis due to poison ivy    • Elbow fracture    • Esophageal reflux    • GERD (gastroesophageal reflux disease)    • Head injury    • History of transfusion    • Hyperglycemia    • Hyperlipidemia    • Hypertension    • Kidney carcinoma (HCC)    • Nonischemic cardiomyopathy (HCC)    • Renal oncocytoma    • Seasonal allergic reaction    • Sinusitis    • Syncope    • Type 2 diabetes mellitus (HCC)     uncontrolled   • Visual field defect      Past Surgical History:   Procedure Laterality Date   • AORTIC VALVE REPAIR/REPLACEMENT     • ASCENDING AORTIC ANEURYSM REPAIR W/ MECHANICAL AORTIC VALVE REPLACEMENT     • COLONOSCOPY N/A 10/28/2021    Procedure: COLONOSCOPY to cecum:  cold snare polyps,;  Surgeon: Dinesh Meza MD;  Location: [facility] ENDOSCOPY;  Service: Gastroenterology;  Laterality: N/A;  pre:  Iron deficiency anemia  post:  polyps, diverticulosis,    • ENDOSCOPY N/A 10/28/2021    Procedure: ESOPHAGOGASTRODUODENOSCOPY with biopsies;  Surgeon: Dinesh Meza MD;  Location: [facility] ENDOSCOPY;  Service: Gastroenterology;  Laterality: N/A;  pre:  Iron deficiency anemia  post:  duodenitis and gastritis   • EYE SURGERY  12/09/2020    cataract surgery    • NEPHRECTOMY     • OTHER SURGICAL HISTORY      elbow surgery   • PROSTATE SURGERY     • THORACENTESIS Left     diagnostic     Family History   Problem Relation Age of Onset   • Cancer Mother         colon   • Cancer Brother         colon     Social History     Tobacco Use   • Smoking status: Former Smoker   • Smokeless tobacco: Former User   • Tobacco comment: caffeine use   Substance Use Topics   • Alcohol use: Yes     Comment: occasional   • Drug use: No     Medications Prior to Admission   Medication Sig Dispense Refill Last Dose   • aspirin 81 MG EC tablet Take 81 mg by mouth Daily.       • atorvastatin (LIPITOR) 40 MG tablet Take 40 mg by mouth Daily.      • Blood Glucose Monitoring Suppl ([employer] Blood Glucose) kit 1 each 3 (Three) Times a Day.      • digoxin (LANOXIN) 125 MCG tablet Take 125 mcg by mouth Daily.      • diphenhydrAMINE (BENADRYL) 25 MG tablet Take 50 mg by mouth 2 (Two) Times a Day.   11/1/2021 at Unknown time   • enoxaparin (LOVENOX) 80 MG/0.8ML solution syringe Inject 80 mg under the skin into the appropriate area as directed Every 12 (Twelve) Hours.      • ferrous gluconate (FERGON) 324 MG tablet Take 324 mg by mouth Every Other Day.      • furosemide (LASIX) 40 MG tablet Take 40 mg by mouth Daily.      • glucose blood ([employer] Blood Glucose Test) test strip 1 each by Other route 3 (Three) Times a Day.      • guaiFENesin (MUCINEX) 600 MG 12 hr tablet Take 600 mg by mouth Every 12 (Twelve) Hours.   11/1/2021 at Unknown time   • Insulin Degludec (TRESIBA FLEXTOUCH SC) Inject 30-65 Units under the skin into the appropriate area as directed Daily As Needed. Use as directed if blood sugar gets too high      • Insulin Disposable Pump (V-Go 40) kit USE AS DIRECTED THREE TIMES A DAY 30 each 11 11/1/2021 at Unknown time   • insulin lispro (humaLOG) 100 UNIT/ML injection USE AS DIRECTED WITH V-GO PUMP 30 mL 6 11/1/2021 at Unknown time   • Insulin Pen Needle (Pen Needles) 32G X 4 MM misc 1 each Daily As Needed. Use with Tresiba      • Lancet Devices ([employer] Autolet Lancing Device) misc 1 each 3 (Three) Times a Day.      • magnesium oxide (MAG-OX) 400 MG tablet Take 400 mg by mouth 2 (Two) Times a Day.   11/1/2021 at Unknown time   • metoprolol succinate XL (TOPROL-XL) 25 MG 24 hr tablet Take 25 mg by mouth Daily.      • Omega-3 Fatty Acids (FISH OIL) 1000 MG capsule capsule Take 4,000 mg by mouth Daily With Breakfast.   11/1/2021 at Unknown time   • pantoprazole (PROTONIX) 40 MG EC tablet Take 40 mg by mouth Daily.      • sodium bicarbonate 650 MG tablet Take 1,950 mg by mouth 2 (Two)  "Times a Day. (6 tablets total per day)   11/1/2021 at Unknown time     Allergies:  Penicillins, Percocet  [oxycodone-acetaminophen], and Other      Objective     Vital Signs  Temp:  [97.7 °F (36.5 °C)-98.5 °F (36.9 °C)] 98.5 °F (36.9 °C)  Heart Rate:  [78-99] 94  Resp:  [12-20] 16  BP: (118-138)/(54-71) 125/59    Flowsheet Rows      First Filed Value   Admission Height 182.9 cm (72\") Documented at 11/02/2021 0344   Admission Weight 81.6 kg (180 lb) Documented at 11/02/2021 0344           I/O this shift:  In: 1000 [I.V.:1000]  Out: 100 [Urine:100]  I/O last 3 completed shifts:  In: 1648.3 [P.O.:60; I.V.:1000; Blood:588.3]  Out: 300 [Urine:300]    Intake/Output Summary (Last 24 hours) at 11/5/2021 1605  Last data filed at 11/5/2021 1217  Gross per 24 hour   Intake 1288.33 ml   Output 350 ml   Net 938.33 ml       Physical Exam:  NAD; pleasant; partially oriented (does not know day of week or month)  Pale; looks stated age  MMM; AT/NC   No eye discharge; no scleral icterus  No JVD; no carotid bruits  Decreased breath sounds in bases bilat; not labored on RA  Irregularly irregular, tachycardic, mental click, 2/6M  Firm, +T but no G or R, +D, BS+  Trace edema  No clubbing  No asterixis  Moves all extremities   Mood and affect are normal    Results Review:  Results from last 7 days   Lab Units 11/05/21  1000 11/04/21  0608 11/03/21  0447   SODIUM mmol/L 144 141 139   POTASSIUM mmol/L 5.0 5.0 5.0   CHLORIDE mmol/L 111* 109* 107   CO2 mmol/L 20.5* 24.3 23.2   BUN mg/dL 49* 40* 35*   CREATININE mg/dL 2.06* 1.89* 1.55*   CALCIUM mg/dL 8.0* 8.0* 8.2*   BILIRUBIN mg/dL 0.7 1.0 0.4   ALK PHOS U/L 79 76 89   ALT (SGPT) U/L 18 16 17   AST (SGOT) U/L 31 19 18   GLUCOSE mg/dL 184* 151* 261*       Estimated Creatinine Clearance: 31.4 mL/min (A) (by C-G formula based on SCr of 2.06 mg/dL (H)).    Results from last 7 days   Lab Units 11/05/21  1000 11/04/21  0608 11/03/21  0447   MAGNESIUM mg/dL 2.6* 2.3 2.4       Results from last 7 " days   Lab Units 11/05/21  1000 11/05/21  0026 11/04/21  1808 11/04/21  1156 11/04/21  0608 11/03/21  1211 11/03/21  0447 11/02/21  0455 11/02/21  0324 11/01/21  0941 11/01/21  0941   WBC 10*3/mm3 16.08*  --   --   --  14.55*  --  7.47  --  5.78  --  6.29   HEMOGLOBIN g/dL 7.6*  7.6* 6.7* 7.1* 7.9* 8.0*   < > 7.9*  7.9*   < > 7.2*   < > 8.8*   PLATELETS 10*3/mm3 265  --   --   --  130*  --  151  --  151  --  186    < > = values in this interval not displayed.       Results from last 7 days   Lab Units 11/05/21  1000 11/04/21  0608 11/03/21  0447 11/02/21  0324 11/01/21  0941   INR  2.28* 2.45* 2.29* 2.14* 1.78*       Active Medications  atorvastatin, 40 mg, Oral, Daily  dextrose, 25 g, Intravenous, Once  digoxin, 125 mcg, Oral, Daily  ferrous sulfate, 325 mg, Oral, Daily With Breakfast  guaiFENesin, 600 mg, Oral, Q12H  insulin lispro, 0-9 Units, Subcutaneous, 4x Daily With Meals & Nightly  magnesium oxide, 400 mg, Oral, BID  metoprolol succinate XL, 25 mg, Oral, Daily  pantoprazole, 40 mg, Intravenous, Q12H  sodium bicarbonate, 1,950 mg, Oral, BID      sodium chloride, 100 mL/hr, Last Rate: 100 mL/hr (11/05/21 0823)        Assessment/Plan   Assessment  1. BYRON on CKD3, oliguric, prerenal due to hypovolemia, blood loss, hypotension, and recent contrast exposure on 11/2/21.  Urinary retention is a concern given history of TURP and prostate cancer. Electrolytes are compensated. UA this admission was negative for blood and protein, thogh 21-30 WBC/hpf. CT's this admission show left kidney without uropathy or calculus.   2. Acute GI bleed with bloody stools noted this admission and tagged RBC scan showing bleed in the RLQ consistent with distal ileum or proximal colon. Colonoscopy planned when INR is sub-therapeutic.  3. Acute blood loss anemia related to GI bleed. He has received 5 units PRBCs this admission.  4. History of mechanical AVR and Atrial Fibrillation anticoagulated with warfarin. His warfarin is being held  and IV heparin drip infusing  5. Hypertension, though now with hypotension; metoprolol succinate is being held due to soft BP.   6. Recurrent right pleural effusion; transudate per last thoracentesis. Repeat thoracentesis planned when more stable. Home lasix of 40 mg daily is being held.   7. Type II diabetes. Hgb A1c 5.2 last admission.  8. History of CVA with left side hemiparesis        Lower GI bleed    Atrial fibrillation (HCC)    Hypertension    Uncontrolled type 2 diabetes mellitus with complication, with long-term current use of insulin (HCC)    Hx of aortic valve replacement, mechanical [Z95.2]    Stage 3b chronic kidney disease (HCC)    Chronic anticoagulation    History of nephrectomy    Abnormal urinalysis    Recurrent right pleural effusion    Hemiparesis of left nondominant side as late effect of cerebral infarction (HCC)    Rectus sheath hematoma    Acute posthemorrhagic anemia      Plan  1. Normal saline for now  2. Check bladder scan and FENa  3. Check digoxin level.  Will need to be cautious about daily digoxin given CKD  4. Endoscopy soon      I discussed the patient's findings and my recommendations with patient and his wife at bedside    [facility]  --Ry Martin MD    11/05/21  17:35 EDT

## 2021-11-05 NOTE — CASE MANAGEMENT/SOCIAL WORK
Continued Stay Note  [facility]     Patient Name: Francisco Sequeira  MRN: [id number]  Today's Date: 11/5/2021    Admit Date: 11/2/2021     Discharge Plan     Row Name 11/05/21 1021       Plan    Plan Home [facility] - patient was current with their services prior to admission.    Patient/Family in Agreement with Plan yes    Plan Comments Plan is currently home with [facility] - accepted/selected and should appear on AVS.  No additional needs identified at this time - please advise if this changed.  CCP to follow.  Petra               Discharge Codes    No documentation.                     Rizwan Sims

## 2021-11-05 NOTE — PLAN OF CARE
Problem: Adult Inpatient Plan of Care  Goal: Plan of Care Review  Outcome: Ongoing, Progressing   Goal Outcome Evaluation:   Vss.video swallow cancelled. Renal consulted h&h every 6 hrs.No c/o pain or n/v.

## 2021-11-05 NOTE — TELEPHONE ENCOUNTER
Rx Refill Note  Requested Prescriptions     Pending Prescriptions Disp Refills   • mupirocin (BACTROBAN) 2 % ointment 22 g 1     Sig: APPLY TO AFFECTED AREAS THREE TIMES A DAY      Last office visit with prescribing clinician: 6/2/2021      Next office visit with prescribing clinician: 12/2/2021            Amirah Coleman MA  11/05/21, 11:16 EDT

## 2021-11-06 ENCOUNTER — HOME CARE VISIT (OUTPATIENT)
Dept: HOME HEALTH SERVICES | Facility: HOME HEALTHCARE | Age: 86
End: 2021-11-06

## 2021-11-06 LAB
ABO GROUP BLD: NORMAL
ALBUMIN SERPL-MCNC: 2.5 G/DL (ref 3.5–5.2)
ANION GAP SERPL CALCULATED.3IONS-SCNC: 11.1 MMOL/L (ref 5–15)
BLD GP AB SCN SERPL QL: NEGATIVE
BUN SERPL-MCNC: 49 MG/DL (ref 8–23)
BUN/CREAT SERPL: 26.2 (ref 7–25)
CALCIUM SPEC-SCNC: 7.9 MG/DL (ref 8.6–10.5)
CHLORIDE SERPL-SCNC: 113 MMOL/L (ref 98–107)
CO2 SERPL-SCNC: 21.9 MMOL/L (ref 22–29)
CREAT SERPL-MCNC: 1.87 MG/DL (ref 0.76–1.27)
DEPRECATED RDW RBC AUTO: 57.3 FL (ref 37–54)
DIGOXIN SERPL-MCNC: 1.1 NG/ML (ref 0.6–1.2)
ERYTHROCYTE [DISTWIDTH] IN BLOOD BY AUTOMATED COUNT: 20.2 % (ref 12.3–15.4)
GFR SERPL CREATININE-BSD FRML MDRD: 35 ML/MIN/1.73
GLUCOSE BLDC GLUCOMTR-MCNC: 114 MG/DL (ref 70–130)
GLUCOSE BLDC GLUCOMTR-MCNC: 153 MG/DL (ref 70–130)
GLUCOSE BLDC GLUCOMTR-MCNC: 153 MG/DL (ref 70–130)
GLUCOSE BLDC GLUCOMTR-MCNC: 168 MG/DL (ref 70–130)
GLUCOSE SERPL-MCNC: 141 MG/DL (ref 65–99)
HCT VFR BLD AUTO: 21.9 % (ref 37.5–51)
HCT VFR BLD AUTO: 22 % (ref 37.5–51)
HCT VFR BLD AUTO: 24.9 % (ref 37.5–51)
HCT VFR BLD AUTO: 25.9 % (ref 37.5–51)
HGB BLD-MCNC: 6.9 G/DL (ref 13–17.7)
HGB BLD-MCNC: 7 G/DL (ref 13–17.7)
HGB BLD-MCNC: 8.2 G/DL (ref 13–17.7)
HGB BLD-MCNC: 8.4 G/DL (ref 13–17.7)
INR PPP: 2.02 (ref 0.9–1.1)
MCH RBC QN AUTO: 24.9 PG (ref 26.6–33)
MCHC RBC AUTO-ENTMCNC: 31.5 G/DL (ref 31.5–35.7)
MCV RBC AUTO: 79.1 FL (ref 79–97)
PHOSPHATE SERPL-MCNC: 3.1 MG/DL (ref 2.5–4.5)
PLATELET # BLD AUTO: 145 10*3/MM3 (ref 140–450)
PMV BLD AUTO: 11.7 FL (ref 6–12)
POTASSIUM SERPL-SCNC: 4.4 MMOL/L (ref 3.5–5.2)
PROTHROMBIN TIME: 22.6 SECONDS (ref 11.7–14.2)
RBC # BLD AUTO: 2.77 10*6/MM3 (ref 4.14–5.8)
RH BLD: POSITIVE
SODIUM SERPL-SCNC: 146 MMOL/L (ref 136–145)
T&S EXPIRATION DATE: NORMAL
WBC # BLD AUTO: 12.45 10*3/MM3 (ref 3.4–10.8)

## 2021-11-06 PROCEDURE — 80069 RENAL FUNCTION PANEL: CPT | Performed by: NURSE PRACTITIONER

## 2021-11-06 PROCEDURE — 86900 BLOOD TYPING SEROLOGIC ABO: CPT | Performed by: HOSPITALIST

## 2021-11-06 PROCEDURE — 85027 COMPLETE CBC AUTOMATED: CPT | Performed by: HOSPITALIST

## 2021-11-06 PROCEDURE — 36415 COLL VENOUS BLD VENIPUNCTURE: CPT | Performed by: HOSPITALIST

## 2021-11-06 PROCEDURE — 85014 HEMATOCRIT: CPT | Performed by: HOSPITALIST

## 2021-11-06 PROCEDURE — 86850 RBC ANTIBODY SCREEN: CPT | Performed by: HOSPITALIST

## 2021-11-06 PROCEDURE — 99232 SBSQ HOSP IP/OBS MODERATE 35: CPT | Performed by: STUDENT IN AN ORGANIZED HEALTH CARE EDUCATION/TRAINING PROGRAM

## 2021-11-06 PROCEDURE — 80162 ASSAY OF DIGOXIN TOTAL: CPT | Performed by: INTERNAL MEDICINE

## 2021-11-06 PROCEDURE — 86900 BLOOD TYPING SEROLOGIC ABO: CPT

## 2021-11-06 PROCEDURE — 85018 HEMOGLOBIN: CPT | Performed by: HOSPITALIST

## 2021-11-06 PROCEDURE — 36430 TRANSFUSION BLD/BLD COMPNT: CPT

## 2021-11-06 PROCEDURE — P9016 RBC LEUKOCYTES REDUCED: HCPCS

## 2021-11-06 PROCEDURE — 82962 GLUCOSE BLOOD TEST: CPT

## 2021-11-06 PROCEDURE — 86901 BLOOD TYPING SEROLOGIC RH(D): CPT | Performed by: HOSPITALIST

## 2021-11-06 PROCEDURE — 85610 PROTHROMBIN TIME: CPT | Performed by: HOSPITALIST

## 2021-11-06 PROCEDURE — 86923 COMPATIBILITY TEST ELECTRIC: CPT

## 2021-11-06 PROCEDURE — 99232 SBSQ HOSP IP/OBS MODERATE 35: CPT | Performed by: NURSE PRACTITIONER

## 2021-11-06 PROCEDURE — 63710000001 INSULIN LISPRO (HUMAN) PER 5 UNITS: Performed by: NURSE PRACTITIONER

## 2021-11-06 RX ORDER — DEXTROSE AND SODIUM CHLORIDE 5; .45 G/100ML; G/100ML
100 INJECTION, SOLUTION INTRAVENOUS CONTINUOUS
Status: DISCONTINUED | OUTPATIENT
Start: 2021-11-06 | End: 2021-11-06

## 2021-11-06 RX ADMIN — SODIUM BICARBONATE 1950 MG: 650 TABLET ORAL at 09:49

## 2021-11-06 RX ADMIN — DIGOXIN 125 MCG: 250 TABLET ORAL at 09:49

## 2021-11-06 RX ADMIN — FERROUS SULFATE TAB 325 MG (65 MG ELEMENTAL FE) 325 MG: 325 (65 FE) TAB at 09:50

## 2021-11-06 RX ADMIN — GUAIFENESIN 600 MG: 600 TABLET, EXTENDED RELEASE ORAL at 20:05

## 2021-11-06 RX ADMIN — MAGNESIUM OXIDE 400 MG (241.3 MG MAGNESIUM) TABLET 400 MG: TABLET at 09:50

## 2021-11-06 RX ADMIN — PANTOPRAZOLE SODIUM 40 MG: 40 INJECTION, POWDER, FOR SOLUTION INTRAVENOUS at 20:03

## 2021-11-06 RX ADMIN — INSULIN LISPRO 2 UNITS: 100 INJECTION, SOLUTION INTRAVENOUS; SUBCUTANEOUS at 09:48

## 2021-11-06 RX ADMIN — MAGNESIUM OXIDE 400 MG (241.3 MG MAGNESIUM) TABLET 400 MG: TABLET at 20:04

## 2021-11-06 RX ADMIN — METOPROLOL SUCCINATE 25 MG: 25 TABLET, EXTENDED RELEASE ORAL at 09:49

## 2021-11-06 RX ADMIN — SODIUM CHLORIDE 100 ML/HR: 9 INJECTION, SOLUTION INTRAVENOUS at 05:31

## 2021-11-06 RX ADMIN — PANTOPRAZOLE SODIUM 40 MG: 40 INJECTION, POWDER, FOR SOLUTION INTRAVENOUS at 09:50

## 2021-11-06 RX ADMIN — INSULIN LISPRO 2 UNITS: 100 INJECTION, SOLUTION INTRAVENOUS; SUBCUTANEOUS at 13:23

## 2021-11-06 RX ADMIN — GUAIFENESIN 600 MG: 600 TABLET, EXTENDED RELEASE ORAL at 09:50

## 2021-11-06 RX ADMIN — ATORVASTATIN CALCIUM 40 MG: 20 TABLET, FILM COATED ORAL at 09:49

## 2021-11-06 RX ADMIN — INSULIN LISPRO 2 UNITS: 100 INJECTION, SOLUTION INTRAVENOUS; SUBCUTANEOUS at 22:26

## 2021-11-06 RX ADMIN — SODIUM BICARBONATE 1950 MG: 650 TABLET ORAL at 20:03

## 2021-11-06 NOTE — PROGRESS NOTES
Name: Francisco Sequeira ADMIT: 2021   : 1935  PCP: Rubin Hinkle APRN    MRN: [id number] LOS: 4 days   AGE/SEX: 85 y.o. male  ROOM: E4/     Subjective   Subjective    No new issues.  Less pain RLQ though describes some pruritus.    Review of Systems   Constitutional: Positive for fatigue.   Gastrointestinal: Positive for abdominal pain.   Genitourinary: Positive for decreased urine volume. Negative for difficulty urinating.        Objective   Objective   Vital Signs  Temp:  [97.7 °F (36.5 °C)-98.5 °F (36.9 °C)] 98.1 °F (36.7 °C)  Heart Rate:  [] 112  Resp:  [16-18] 16  BP: (118-125)/(59-70) 118/70  SpO2:  [92 %-100 %] 100 %  on   ;   Device (Oxygen Therapy): room air  Body mass index is 24.41 kg/m².  Physical Exam  Vitals and nursing note reviewed. Exam conducted with a chaperone present (wife).   Constitutional:       General: He is not in acute distress.     Appearance: He is ill-appearing. He is not toxic-appearing or diaphoretic.   HENT:      Head: Normocephalic and atraumatic.   Eyes:      General: No scleral icterus.     Extraocular Movements: Extraocular movements intact.      Conjunctiva/sclera: Conjunctivae normal.   Cardiovascular:      Rate and Rhythm: Normal rate. Rhythm irregularly irregular.      Pulses: Normal pulses.      Comments: Mechanical s2  Pulmonary:      Effort: Pulmonary effort is normal. No respiratory distress.      Breath sounds: Normal breath sounds. No wheezing or rales.      Comments: Decreased BS right base  Abdominal:      General: Bowel sounds are normal. There is no distension.      Palpations: Abdomen is soft.      Tenderness: There is abdominal tenderness (RLQ). There is guarding (voluntary). There is no rebound.   Musculoskeletal:         General: No swelling or deformity. Normal range of motion.      Cervical back: Neck supple. No rigidity.   Lymphadenopathy:      Cervical: No cervical adenopathy.   Skin:     General: Skin is warm and dry.       Capillary Refill: Capillary refill takes less than 2 seconds.      Coloration: Skin is pale.      Findings: Bruising (Right abdomen wall) present.   Neurological:      Mental Status: He is alert and oriented to person, place, and time. Mental status is at baseline.      Motor: Weakness (very mild chronic left weakness, at baseline) present.   Psychiatric:         Mood and Affect: Mood normal.         Behavior: Behavior normal.         Results Review     I reviewed the patient's new clinical results.  Results from last 7 days   Lab Units 11/06/21  0550 11/06/21  0012 11/05/21  1758 11/05/21  1000 11/04/21  1156 11/04/21  0608 11/03/21  1211 11/03/21 0447   WBC 10*3/mm3 12.45*  --   --  16.08*  --  14.55*  --  7.47   HEMOGLOBIN g/dL 6.9* 7.0* 7.9* 7.6*  7.6*   < > 8.0*   < > 7.9*  7.9*   PLATELETS 10*3/mm3 145  --   --  265  --  130*  --  151    < > = values in this interval not displayed.     Results from last 7 days   Lab Units 11/06/21  0550 11/05/21  1000 11/04/21  0608 11/03/21  0447   SODIUM mmol/L 146* 144 141 139   POTASSIUM mmol/L 4.4 5.0 5.0 5.0   CHLORIDE mmol/L 113* 111* 109* 107   CO2 mmol/L 21.9* 20.5* 24.3 23.2   BUN mg/dL 49* 49* 40* 35*   CREATININE mg/dL 1.87* 2.06* 1.89* 1.55*   GLUCOSE mg/dL 141* 184* 151* 261*   EGFR IF NONAFRICN AM mL/min/1.73 35* 31* 34* 43*     Results from last 7 days   Lab Units 11/06/21  0550 11/05/21  1000 11/04/21  0608 11/03/21  0447 11/02/21  0324 11/02/21  0324   ALBUMIN g/dL 2.50* 2.60* 2.60* 3.00*   < > 3.30*   BILIRUBIN mg/dL  --  0.7 1.0 0.4  --  0.3   ALK PHOS U/L  --  79 76 89  --  92   AST (SGOT) U/L  --  31 19 18  --  23   ALT (SGPT) U/L  --  18 16 17  --  21    < > = values in this interval not displayed.     Results from last 7 days   Lab Units 11/06/21  0550 11/05/21  1000 11/04/21  0608 11/03/21  0447   CALCIUM mg/dL 7.9* 8.0* 8.0* 8.2*   ALBUMIN g/dL 2.50* 2.60* 2.60* 3.00*   MAGNESIUM mg/dL  --  2.6* 2.3 2.4   PHOSPHORUS mg/dL 3.1  --   --   --       Results from last 7 days   Lab Units 11/02/21  0324   PROCALCITONIN ng/mL 0.12     Results from last 7 days   Lab Units 11/06/21  0550 11/05/21  1000 11/04/21  0608 11/03/21  0447 11/02/21  0324 11/01/21  0941 11/01/21  0000 10/31/21  0000 10/30/21  0920   PROTIME Seconds 22.6* 24.9* 26.3* 25.0* 23.7* 20.5*  --   --  15.6*   INR  2.02* 2.28* 2.45* 2.29* 2.14* 1.78* 1.80   < > 1.27*    < > = values in this interval not displayed.     Glucose   Date/Time Value Ref Range Status   11/06/2021 0606 153 (H) 70 - 130 mg/dL Final     Comment:     Meter: [id number] : [id number] Dacia Fraser    11/05/2021 2207 183 (H) 70 - 130 mg/dL Final     Comment:     Meter: [id number] : [id number] Dacia Fraser    11/05/2021 1606 199 (H) 70 - 130 mg/dL Final     Comment:     Meter: [id number] : [id number] Jm Martinez    11/05/2021 1127 215 (H) 70 - 130 mg/dL Final     Comment:     Meter: [id number] : [id number] Jm Martinez    11/05/2021 0549 174 (H) 70 - 130 mg/dL Final     Comment:     Meter: [id number] : [id number] Jason Stone    11/04/2021 2029 188 (H) 70 - 130 mg/dL Final     Comment:     Meter: [id number] : [id number] Gomez Bertha NA   11/04/2021 1626 206 (H) 70 - 130 mg/dL Final     Comment:     Meter: [id number] : [id number] Cam ALEJO       CT Abdomen Pelvis Without Contrast  11/4  CONCLUSION:  1. Right-sided pleural effusion appears slightly larger compared to the  previous examination, new small left-sided pleural effusion has  developed. There is diffuse body wall edema and cardiac enlargement.  Small amount of free fluid is demonstrated within the peritoneal cavity.  2. Sizable bilobed collection has developed within the right rectus  abdominis muscle, consistent with hematoma. See above dimensions.  3. Diverticulosis of the colon, subtle pericolonic induration about the  mid sigmoid suggests possible diverticulitis.  4. Urine demonstrates increased density, perhaps related to  "residual  contrast from previous CT or hemorrhage. Correlation with urinalysis  advised.  5. Gallstones.        Scheduled Medications  atorvastatin, 40 mg, Oral, Daily  dextrose, 25 g, Intravenous, Once  digoxin, 125 mcg, Oral, Daily  ferrous sulfate, 325 mg, Oral, Daily With Breakfast  guaiFENesin, 600 mg, Oral, Q12H  insulin lispro, 0-9 Units, Subcutaneous, 4x Daily With Meals & Nightly  magnesium oxide, 400 mg, Oral, BID  metoprolol succinate XL, 25 mg, Oral, Daily  pantoprazole, 40 mg, Intravenous, Q12H  sodium bicarbonate, 1,950 mg, Oral, BID    Infusions  sodium chloride, 100 mL/hr, Last Rate: 100 mL/hr (11/06/21 0531)    Diet  Diet Clear Liquid; Thin       Assessment/Plan     Active Hospital Problems    Diagnosis  POA   • **Lower GI bleed [K92.2]  Yes   • Rectus sheath hematoma [S30.1XXA]  No   • Acute posthemorrhagic anemia [D62]  Yes   • History of nephrectomy [Z90.5]  Not Applicable   • Abnormal urinalysis [R82.90]  Yes   • Recurrent right pleural effusion [J90]  Yes   • 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       86 yo gentleman with h/o DM2, AFib, Mech AVR (on warfarin), HTN, CKD3, h/o RCC s/p right nephrectomy and h/o CVA with mild residual left hemiparesis, admitted here from 10/23 to 10/30 for \"obscure\" GI bleed and bilateral pleural effusions (transudate), who presented to ER with c/o rectal bleeding and generalized weakness. Found to have drop in Hgb from 9.3 to 7.2. " Stool grossly bloody in ER.     Hemoglobin drifted back down slightly following blood transfusion.  Will give another unit of PRBC today.  Continue monitoring.  INR slowly drifting down.  Cardiology following.  Appreciate nephrology assistance, creatinine slightly better today.  Blood sugars relatively stable.  GI following for eventual endoscopy.    Deferring IV fluids to nephrology   Pulmonology following, may need thoracentesis.  ST recommends VFSS when GI issues resolved.  BS relatively stable on SSI.      SCDs for DVT prophylaxis.  Full code.  Discussed with patient, family and consulting provider.  Anticipate discharge home with HH vs SNU facility next week.      Santos Guzman MD  [facility]  11/06/21  07:41 EDT

## 2021-11-06 NOTE — PLAN OF CARE
Goal Outcome Evaluation:               VSS. 1 unit RBC administered today ,Hgb currently 8.2.Pt mostly asleep today, poor Po intake, requires encouragement to eat.IVF d/c'd this shift.Huge loose Bm*1 today , stool dark. PVR bladder scan  126 this shift .Family at bed side ,WCTM.

## 2021-11-06 NOTE — PLAN OF CARE
Problem: Adult Inpatient Plan of Care  Goal: Plan of Care Review  Outcome: Ongoing, Progressing  Flowsheets (Taken 11/6/2021 0536)  Plan of Care Reviewed With: patient  Outcome Summary: VSS. Pt denies pain. No complaints of nausea and vomitting. Serial HH ordered. Last HGB 7.0 Am labs to be drawn. Pt tolerating clear liquids. Plan for EGD/SCOPE pending labs/INR GI following. VFSS cancelled at this time. IVF continued. PVR/bladder scan 129. Will continue to monitor.   Goal Outcome Evaluation:  Plan of Care Reviewed With: patient           Outcome Summary: VSS. Pt denies pain. No complaints of nausea and vomitting. Serial HH ordered. Last HGB 7.0 Am labs to be drawn. Pt tolerating clear liquids. Plan for EGD/SCOPE pending labs/INR GI following. VFSS cancelled at this time. IVF continued. PVR/bladder scan 129. Will continue to monitor.

## 2021-11-06 NOTE — PROGRESS NOTES
Gastroenterology   Inpatient Progress Note    Reason for Follow Up:  GI bleed, abnormal tagged red blood cell scan    Subjective  Interval History:   Patient discharged October 30, 2021 for generalized weakness and GI bleed with iron deficiency anemia.  EGD and colonoscopy were performed, no cause for GI bleeding noted and patient was recommended to follow-up outpatient for small bowel capsule endoscopy.  He presented to ER November 2, 2021 with black stool, maroon stool and hemoglobin of 7.2, decreased from 9.1 upon discharge.    As of this morning at 8 AM, he has had a total of 6 units PRBC during this admission.   Hemoglobin decreased to 6.9 this a.m.   Am INR 2.02, anticoagulation on hold.  No evidence of melena or hematochezia.    CT of the abdomen November 4, 2021 with right rectus sheath hematoma.    Tagged red blood cell November 2, 2021 with active GI bleed in the right lower quadrant in the region of distal ileum or proximal ascending colon    Colonoscopy October 28, 2021 performed for iron deficiency anemia.    3- 4 to 5 mm polyps in the ascending colon, tubular adenoma  4- 4 to 5 mm polyps in the transverse colon, tubular adenoma  Diverticulosis in the entire examined colon  Otherwise normal.    Current Facility-Administered Medications:   •  acetaminophen (TYLENOL) tablet 650 mg, 650 mg, Oral, Q4H PRN **OR** [DISCONTINUED] acetaminophen (TYLENOL) 160 MG/5ML solution 650 mg, 650 mg, Oral, Q4H PRN **OR** [DISCONTINUED] acetaminophen (TYLENOL) suppository 650 mg, 650 mg, Rectal, Q4H PRN, Carmen Welch APRN  •  atorvastatin (LIPITOR) tablet 40 mg, 40 mg, Oral, Daily, Latrice Barfield APRN, 40 mg at 11/06/21 0949  •  dextrose (D50W) (25 g/50 mL) IV injection 25 g, 25 g, Intravenous, Q15 Min PRN, Carmen Welch APRN  •  dextrose (D50W) (25 g/50 mL) IV injection 25 g, 25 g, Intravenous, Once, Kirk Mcdonald III, PA  •  dextrose (GLUTOSE) oral gel 15 g, 15 g, Oral, Q15 Min PRN,  Carmen Welch APRN  •  digoxin (LANOXIN) tablet 125 mcg, 125 mcg, Oral, Daily, Latrice Barfield APRN, 125 mcg at 11/06/21 0949  •  diphenhydrAMINE (BENADRYL) capsule 25 mg, 25 mg, Oral, BID PRN, Latrice Barfield APRN  •  ferrous sulfate tablet 325 mg, 325 mg, Oral, Daily With Breakfast, Latrice Barfield APRN, 325 mg at 11/06/21 0950  •  glucagon (human recombinant) (GLUCAGEN DIAGNOSTIC) injection 1 mg, 1 mg, Subcutaneous, PRN, Carmen Welch APRN  •  guaiFENesin (MUCINEX) 12 hr tablet 600 mg, 600 mg, Oral, Q12H, Latrice Barfield APRN, 600 mg at 11/06/21 0950  •  HYDROcodone-acetaminophen (NORCO) 5-325 MG per tablet 1 tablet, 1 tablet, Oral, Q6H PRN, Grant Yin MD, 1 tablet at 11/02/21 2347  •  insulin lispro (ADMELOG) injection 0-9 Units, 0-9 Units, Subcutaneous, 4x Daily With Meals & Nightly, Carmen Welch APRN, 2 Units at 11/06/21 1323  •  magnesium oxide (MAG-OX) tablet 400 mg, 400 mg, Oral, BID, Latrice Barfield APRN, 400 mg at 11/06/21 0950  •  metoprolol succinate XL (TOPROL-XL) 24 hr tablet 25 mg, 25 mg, Oral, Daily, Latrice Barfield APRN, 25 mg at 11/06/21 0949  •  nitroglycerin (NITROSTAT) SL tablet 0.4 mg, 0.4 mg, Sublingual, Q5 Min PRN, Carmen Welch APRN  •  ondansetron (ZOFRAN) tablet 4 mg, 4 mg, Oral, Q6H PRN **OR** ondansetron (ZOFRAN) injection 4 mg, 4 mg, Intravenous, Q6H PRN, Carmen Welch APRN, 4 mg at 11/04/21 0008  •  pantoprazole (PROTONIX) injection 40 mg, 40 mg, Intravenous, Q12H, Carmen Welch, LIZA, 40 mg at 11/06/21 0950  •  sodium bicarbonate tablet 1,950 mg, 1,950 mg, Oral, BID, Latrice Barfield APRN, 1,950 mg at 11/06/21 0949  •  sodium chloride 0.9 % infusion, 100 mL/hr, Intravenous, Continuous, Grant Yin MD, Last Rate: 100 mL/hr at 11/06/21 0531, 100 mL/hr at 11/06/21 0531  Review of Systems:               Gastroenterology positive for epigastric and right lower quadrant discomfort    Objective      Vital Signs  Temp:  [97.5 °F (36.4 °C)-98.3 °F (36.8 °C)] 97.5 °F (36.4 °C)  Heart Rate:  [] 66  Resp:  [16-20] 20  BP: (116-123)/(58-70) 122/64  Body mass index is 24.41 kg/m².                  Physical Exam:              General: patient awake, alert and cooperative              Eyes: no scleral icterus              Skin: warm and dry, not jaundiced              Abdomen: soft, + epigastric area and right mid abdomen tenderness to light palpation, nondistended; normal bowel sounds, bruise lower right abdomen              Psychiatric: Appropriate affect and behavior                Results Review:                I reviewed the patient's new clinical results.    Results from last 7 days   Lab Units 11/06/21 0550 11/06/21  0012 11/05/21  1758 11/05/21  1000 11/05/21  1000 11/04/21  1156 11/04/21  0608   WBC 10*3/mm3 12.45*  --   --   --  16.08*  --  14.55*   HEMOGLOBIN g/dL 6.9* 7.0* 7.9*   < > 7.6*  7.6*   < > 8.0*   HEMATOCRIT % 21.9* 22.0* 25.5*   < > 23.2*  23.2*   < > 26.3*   PLATELETS 10*3/mm3 145  --   --   --  265  --  130*    < > = values in this interval not displayed.     Results from last 7 days   Lab Units 11/06/21  0550 11/05/21  1000 11/04/21  0608 11/03/21  0447 11/03/21 0447   SODIUM mmol/L 146* 144 141   < > 139   POTASSIUM mmol/L 4.4 5.0 5.0   < > 5.0   CHLORIDE mmol/L 113* 111* 109*   < > 107   CO2 mmol/L 21.9* 20.5* 24.3   < > 23.2   BUN mg/dL 49* 49* 40*   < > 35*   CREATININE mg/dL 1.87* 2.06* 1.89*   < > 1.55*   CALCIUM mg/dL 7.9* 8.0* 8.0*   < > 8.2*   BILIRUBIN mg/dL  --  0.7 1.0  --  0.4   ALK PHOS U/L  --  79 76  --  89   ALT (SGPT) U/L  --  18 16  --  17   AST (SGOT) U/L  --  31 19  --  18   GLUCOSE mg/dL 141* 184* 151*   < > 261*    < > = values in this interval not displayed.     Results from last 7 days   Lab Units 11/06/21  0550 11/05/21  1000 11/04/21  0608   INR  2.02* 2.28* 2.45*     No results found for: LIPASE    Radiology:  CT Abdomen Pelvis Without Contrast   Final  Result      XR Chest 1 View   Final Result   Moderate right pleural effusion appears decreased from the   prior exam. Atelectasis/infiltrate is apparent in the right lower lung.   Continued follow-up recommended.         This report was finalized on 11/2/2021 4:34 PM by Dr. Chava Luke M.D.          NM GI Blood Loss   Final Result   Active GI bleeding from the right lower quadrant in the   region of either the distal ileum or the proximal ascending colon. I   discussed the case with Dr. Al Gerard at around 3:45 PM on the   day of the exam.       This report was finalized on 11/2/2021 5:51 PM by Dr. Grant Jorge M.D.          CT Abdomen Pelvis With Contrast   Final Result       1. No obvious etiology for the patient's GI bleeding is identified.   Patient does have colonic diverticulosis. There is no diverticulitis.   2. Large right pleural effusion. This may be slightly smaller than on   prior study. Previously identified left pleural effusion has almost   completely resolved.       Radiation dose reduction techniques were utilized, including automated   exposure control and exposure modulation based on body size.       This report was finalized on 11/2/2021 5:38 AM by Dr. Yue Frye M.D.              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 GI bleed with melena and bright red rectal bleeding, tagged scan positive with activity in the cecum/terminal ileum  2. Acute blood loss anemia  3. Anticoagulated on Coumadin, currently held  4. Abdominal pain  5. Hypercoagulable, INR currently 2.1, trending down  6. History of mechanical aortic valve  7. Acute kidney injury on chronic kidney disease 3      These problems are new to me  Plan:  · GI bleed with melena and bright red rectal bleeding, tagged red blood cell scan positive with activity in the cecum/terminal ileum.  Patient had colonoscopy less than 2 weeks ago with no evidence of GI bleed or abnormality for anemia noted.  Currently INR is elevated at 2.1.    · To consider IR versus repeat colonoscopy for abnormal tagged red blood cell scan.    · Continue to monitor H&H and transfuse per primary      I discussed the patients findings and my recommendations with patient, family and nursing staff.           Jenn DE JESUS  [facility] [address] [address]  Office: [phone]

## 2021-11-06 NOTE — PROGRESS NOTES
"General Surgery Progress Note    Summary:  Mr. Francisco Sequeira is a 85 y.o. year old gentleman with GI bleeding and rectus sheath hematoma.  Anticoagulation currently on hold.  Responded to transfusion.  No indication for surgical intervention of rectus sheath hematoma.  Continue observation. Ok for ice to area.     Chief Complaint: Abdominal pain    Interval Events: No acute events overnight. Receiving 1 unit PRBC now. No abdominal pain. No N/V. No bloody BM's today.    Vitals: /70 (BP Location: Right arm, Patient Position: Lying)   Pulse 112   Temp 98.1 °F (36.7 °C) (Oral)   Resp 16   Ht 182.9 cm (72\")   Wt 81.6 kg (180 lb)   SpO2 100%   BMI 24.41 kg/m²     GENERAL: alert, well appearing, and in no distress  HEENT: normocephalic, atraumatic, moist mucous membranes, clear sclerae   CHEST: no increased work of breathing, symmetric air entry  CARDIAC: regular rate and rhythm    ABDOMEN: Soft, nondistended, mildly tender, firm area in the right midabdomen  EXTREMITIES: no cyanosis, clubbing, or edema   SKIN: Warm and moist, no rashes    Labs:  Results from last 7 days   Lab Units 11/06/21  0550 11/06/21  0012 11/05/21  1758 11/05/21  1000 11/05/21  1000 11/04/21  1156 11/04/21  0608 11/01/21  0941 10/30/21  0920   WBC 10*3/mm3 12.45*  --   --   --  16.08*  --  14.55*   < > 5.17  4.85   HEMOGLOBIN g/dL 6.9* 7.0* 7.9*   < > 7.6*  7.6*   < > 8.0*   < > 9.3*  9.1*   HEMATOCRIT % 21.9* 22.0* 25.5*   < > 23.2*  23.2*   < > 26.3*   < > 32.5*  31.8*   PLATELETS 10*3/mm3 145  --   --   --  265  --  130*   < > 161  162   MONOCYTES % %  --   --   --   --   --   --   --   --  6.0    < > = values in this interval not displayed.     Results from last 7 days   Lab Units 11/06/21  0550 11/05/21  1000 11/04/21  0608 11/03/21  0447 11/03/21  0447   SODIUM mmol/L 146* 144 141   < > 139   POTASSIUM mmol/L 4.4 5.0 5.0   < > 5.0   CHLORIDE mmol/L 113* 111* 109*   < > 107   CO2 mmol/L 21.9* 20.5* 24.3   < > 23.2   BUN " mg/dL 49* 49* 40*   < > 35*   CREATININE mg/dL 1.87* 2.06* 1.89*   < > 1.55*   CALCIUM mg/dL 7.9* 8.0* 8.0*   < > 8.2*   BILIRUBIN mg/dL  --  0.7 1.0  --  0.4   ALK PHOS U/L  --  79 76  --  89   ALT (SGPT) U/L  --  18 16  --  17   AST (SGOT) U/L  --  31 19  --  18   GLUCOSE mg/dL 141* 184* 151*   < > 261*    < > = values in this interval not displayed.     Results from last 7 days   Lab Units 11/06/21  0550 11/05/21  1000 11/04/21  0608 11/03/21  0447 11/02/21  0324   INR  2.02* 2.28* 2.45*   < > 2.14*   APTT seconds  --   --   --   --  70.3*    < > = values in this interval not displayed.       MOIRA MERCADO MD  General and Endoscopic Surgery  [facility]    [address], [address]  [place], [place], [address]  P: [phone]  F: [phone]

## 2021-11-06 NOTE — NURSING NOTE
Notified that lab called with critical HGB of 6.9. Call placed to Dr Guzman with A. Awaiting call back.

## 2021-11-06 NOTE — PROGRESS NOTES
LOS: 4 days   Patient Care Team:  Rubin Hinkle APRN as PCP - General (Family Medicine)  Audra Vazquez RPH as Pharmacist  Vasquez Niño PharmD as Pharmacist (Pharmacy)      Chief Complaint: Follow-up CHF       Interval History: Sleeping at the moment.  Wife is at the bedside, she denies any complaints.      Objective   Vital Signs  Temp:  [97.5 °F (36.4 °C)-98.3 °F (36.8 °C)] 97.5 °F (36.4 °C)  Heart Rate:  [] 66  Resp:  [16-20] 20  BP: (116-123)/(58-70) 122/64    Intake/Output Summary (Last 24 hours) at 11/6/2021 1326  Last data filed at 11/6/2021 0734  Gross per 24 hour   Intake 1000 ml   Output 475 ml   Net 525 ml           Constitutional:       General: Not in acute distress.     Appearance: Well-developed. Not diaphoretic.   Eyes:      Pupils: Pupils are equal, round, and reactive to light.   HENT:      Head: Normocephalic and atraumatic.   Neck:      Thyroid: No thyromegaly.      Vascular: No JVD.   Pulmonary:      Effort: Pulmonary effort is normal. No respiratory distress.      Breath sounds: Normal breath sounds.   Cardiovascular:      Normal rate. Regular rhythm.      Comments: Crisp valve click    Pulses:     Intact distal pulses.   Abdominal:      General: Bowel sounds are normal. There is no distension.      Palpations: Abdomen is soft. There is no hepatomegaly or splenomegaly.      Tenderness: There is no abdominal tenderness.   Musculoskeletal: Normal range of motion. Skin:     General: Skin is warm and dry.      Findings: No erythema.   Neurological:      Mental Status: Alert and oriented to person, place, and time.   Psychiatric:         Behavior: Behavior normal.         Judgment: Judgment normal.         Results Review:      Results from last 7 days   Lab Units 11/06/21  0550 11/05/21  1000 11/05/21  1000 11/04/21  0608 11/04/21  0608   SODIUM mmol/L 146*  --  144  --  141   POTASSIUM mmol/L 4.4  --  5.0  --  5.0   CHLORIDE mmol/L 113*  --  111*  --  109*   CO2 mmol/L 21.9*  --  20.5*  --   24.3   BUN mg/dL 49*  --  49*  --  40*   CREATININE mg/dL 1.87*  --  2.06*  --  1.89*   GLUCOSE mg/dL 141*   < > 184*   < > 151*   CALCIUM mg/dL 7.9*  --  8.0*  --  8.0*    < > = values in this interval not displayed.         Results from last 7 days   Lab Units 11/06/21  0550 11/06/21  0012 11/05/21  1758 11/05/21  1000 11/05/21  1000 11/04/21  1156 11/04/21  0608   WBC 10*3/mm3 12.45*  --   --   --  16.08*  --  14.55*   HEMOGLOBIN g/dL 6.9* 7.0* 7.9*   < > 7.6*  7.6*   < > 8.0*   HEMATOCRIT % 21.9* 22.0* 25.5*   < > 23.2*  23.2*   < > 26.3*   PLATELETS 10*3/mm3 145  --   --   --  265  --  130*    < > = values in this interval not displayed.     Results from last 7 days   Lab Units 11/06/21  0550 11/05/21  1000 11/04/21  0608 11/03/21  0447 11/02/21  0324   INR  2.02* 2.28* 2.45*   < > 2.14*   APTT seconds  --   --   --   --  70.3*    < > = values in this interval not displayed.         Results from last 7 days   Lab Units 11/05/21  1000   MAGNESIUM mg/dL 2.6*           I reviewed the patient's new clinical results.  I personally viewed and interpreted the patient's EKG/Telemetry data        Medication Review:   atorvastatin, 40 mg, Oral, Daily  dextrose, 25 g, Intravenous, Once  digoxin, 125 mcg, Oral, Daily  ferrous sulfate, 325 mg, Oral, Daily With Breakfast  guaiFENesin, 600 mg, Oral, Q12H  insulin lispro, 0-9 Units, Subcutaneous, 4x Daily With Meals & Nightly  magnesium oxide, 400 mg, Oral, BID  metoprolol succinate XL, 25 mg, Oral, Daily  pantoprazole, 40 mg, Intravenous, Q12H  sodium bicarbonate, 1,950 mg, Oral, BID        sodium chloride, 100 mL/hr, Last Rate: 100 mL/hr (11/06/21 0531)        Assessment/Plan       Lower GI bleed    Atrial fibrillation (HCC)    Hypertension    Uncontrolled type 2 diabetes mellitus with complication, with long-term current use of insulin (HCC)    Hx of aortic valve replacement, mechanical [Z95.2]    Stage 3b chronic kidney disease (HCC)    Chronic anticoagulation    History  of nephrectomy    Abnormal urinalysis    Recurrent right pleural effusion    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  Anemia/lower GIB.  Status post transfusion x5.  Active bleeding on tagged red blood cell scan.  Colonoscopy when INR appropriate.  2.  Atrial fibrillation.  Rate controlled with Toprol and digoxin.  Dig level stable.  Warfarin on hold.  3.  Mechanical aortic valve.  Allowing INR to drift down naturally.  Prefer avoiding FFP so long as he remains hemodynamically stable.  INR 2.02 today.  Lovenox bridge not recommended in presence of active bleed.  4.  History of CVA.  Stroke occurred on a therapeutic INR.    5.  Recurrent right pleural effusion.  Status post thoracentesis on 10/25 with 1650 cc of transudative fluid removed.  6.  Rectal sheath hematoma.  No indication for surgical intervention.      Gabriela Cabrales, APRN  11/06/21  13:26 EDT

## 2021-11-06 NOTE — PROGRESS NOTES
[facility] Consult Note          PATIENT IDENTIFICATION:   Name:  Francisco Sequeira      MRN:  [id number]     85 y.o.  male               SUBJECTIVE:   Feels ok, but not drinking much. Got another unit of blood today. Breathing is comfortable on room air. Abdominal pain a little better.     OBJECTIVE:  Vitals:    11/06/21 0734 11/06/21 1115 11/06/21 1141 11/06/21 1501   BP: 118/70 116/58 122/64 135/59   BP Location: Right arm      Patient Position: Lying      Pulse: 112  66 75   Resp: 16 18 20 20   Temp: 98.1 °F (36.7 °C) 97.6 °F (36.4 °C) 97.5 °F (36.4 °C) 97.7 °F (36.5 °C)   TempSrc: Oral Oral     SpO2: 100%  99% 96%   Weight:       Height:               Body mass index is 24.41 kg/m².    Intake/Output Summary (Last 24 hours) at 11/6/2021 1508  Last data filed at 11/6/2021 1501  Gross per 24 hour   Intake 1429.17 ml   Output 650 ml   Net 779.17 ml     Wt Readings from Last 1 Encounters:   11/02/21 0344 81.6 kg (180 lb)     Wt Readings from Last 3 Encounters:   11/02/21 81.6 kg (180 lb)   10/24/21 85.3 kg (188 lb)   07/30/21 86.2 kg (190 lb)       Physical Exam:  NAD; pleasant; partially oriented   Pale; looks stated age  MMM; AT/NC   No eye discharge; no scleral icterus  No JVD; no carotid bruits  Decreased breath sounds in bases bilat; not labored on RA  Irregularly irregular, metal click, 2/6M  Firm, +T but no G or R, +D, BS+  Trace to +1 LE edema  No clubbing  No asterixis  Moves all extremities   Mood and affect are normal    Scheduled meds:    atorvastatin, 40 mg, Oral, Daily  dextrose, 25 g, Intravenous, Once  digoxin, 125 mcg, Oral, Daily  ferrous sulfate, 325 mg, Oral, Daily With Breakfast  guaiFENesin, 600 mg, Oral, Q12H  insulin lispro, 0-9 Units, Subcutaneous, 4x Daily With Meals & Nightly  magnesium oxide, 400 mg, Oral, BID  metoprolol succinate XL, 25 mg, Oral, Daily  pantoprazole, 40 mg, Intravenous, Q12H  sodium bicarbonate, 1,950 mg, Oral, BID      IV meds:                         sodium chloride, 100 mL/hr, Last Rate: 100 mL/hr (11/06/21 0531)        Data Review:    Results from last 7 days   Lab Units 11/06/21  0550 11/05/21  1000 11/04/21  0608 11/03/21 0447 11/03/21  0447   SODIUM mmol/L 146* 144 141   < > 139   POTASSIUM mmol/L 4.4 5.0 5.0   < > 5.0   CHLORIDE mmol/L 113* 111* 109*   < > 107   CO2 mmol/L 21.9* 20.5* 24.3   < > 23.2   BUN mg/dL 49* 49* 40*   < > 35*   CREATININE mg/dL 1.87* 2.06* 1.89*   < > 1.55*   CALCIUM mg/dL 7.9* 8.0* 8.0*   < > 8.2*   BILIRUBIN mg/dL  --  0.7 1.0  --  0.4   ALK PHOS U/L  --  79 76  --  89   ALT (SGPT) U/L  --  18 16  --  17   AST (SGOT) U/L  --  31 19  --  18   GLUCOSE mg/dL 141* 184* 151*   < > 261*    < > = values in this interval not displayed.     Estimated Creatinine Clearance: 34.5 mL/min (A) (by C-G formula based on SCr of 1.87 mg/dL (H)).  Results from last 7 days   Lab Units 11/05/21  1808   SODIUM UR mmol/L 26   CREATININE UR mg/dL 91.0     Results from last 7 days   Lab Units 11/06/21  0550 11/05/21 1000 11/04/21  0608 11/03/21 0447   MAGNESIUM mg/dL  --  2.6* 2.3 2.4   PHOSPHORUS mg/dL 3.1  --   --   --        Results from last 7 days   Lab Units 11/06/21  0550 11/06/21  0012 11/05/21  1758 11/05/21  1000 11/05/21  0026 11/04/21  1156 11/04/21  0608 11/03/21  1211 11/03/21  0447 11/02/21  0455 11/02/21  0324   WBC 10*3/mm3 12.45*  --   --  16.08*  --   --  14.55*  --  7.47  --  5.78   HEMOGLOBIN g/dL 6.9* 7.0* 7.9* 7.6*  7.6* 6.7*   < > 8.0*   < > 7.9*  7.9*   < > 7.2*   PLATELETS 10*3/mm3 145  --   --  265  --   --  130*  --  151  --  151    < > = values in this interval not displayed.       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 BYRON on CKD3, non-oliguric, stable:  prerenal due to hypovolemia, blood loss, hypotension, and recent contrast exposure on 11/2/21.  Bladder scans are normal. Electrolytes are compensated though with hypernatremia, Na 146, today; UrNa 26 yesterday. UA this admission was negative for blood and protein, though 21-30 WBC/hpf. CT's this admission show left kidney without hydro or calculus.   2. Acute GI bleed with bloody stools noted this admission and tagged RBC scan showing bleed in the RLQ consistent with distal ileum or proximal colon. Colonoscopy planned when INR is sub-therapeutic.  3. Acute blood loss anemia related to GI bleed. He has received 6 units PRBCs this admission.  4. History of mechanical AVR and Atrial Fibrillation anticoagulated with warfarin. His warfarin is being held and IV heparin drip infusing  5. Hypertension; better off metoprolol succinate.  6. Recurrent right pleural effusion; transudate per last thoracentesis. Repeat thoracentesis planned when more stable. Home lasix of 40 mg daily is being held.   7. Type II diabetes. Hgb A1c 5.2 last admission.  8. History of CVA with left side hemiparesis        PLAN:  1. D/c IVF for now given PRBC's and +tolerance of liquid diet so far  2. Surveillance labs.  3. Awaiting endoscopy.    Diane Parker, LIZA  11/6/2021  15:08 EDT     I examined this patient, reviewed the data, and personally edited this note.  I agree with the assessment and plan as outlined above.  --Ry Martin MD  11/06/21  16:27 EDT

## 2021-11-07 LAB
ALBUMIN SERPL-MCNC: 2.6 G/DL (ref 3.5–5.2)
ANION GAP SERPL CALCULATED.3IONS-SCNC: 10.4 MMOL/L (ref 5–15)
BH BB BLOOD EXPIRATION DATE: NORMAL
BH BB BLOOD TYPE BARCODE: 5100
BH BB DISPENSE STATUS: NORMAL
BH BB PRODUCT CODE: NORMAL
BH BB UNIT NUMBER: NORMAL
BUN SERPL-MCNC: 44 MG/DL (ref 8–23)
BUN/CREAT SERPL: 31.4 (ref 7–25)
CALCIUM SPEC-SCNC: 7.9 MG/DL (ref 8.6–10.5)
CHLORIDE SERPL-SCNC: 113 MMOL/L (ref 98–107)
CO2 SERPL-SCNC: 20.6 MMOL/L (ref 22–29)
CREAT SERPL-MCNC: 1.4 MG/DL (ref 0.76–1.27)
CROSSMATCH INTERPRETATION: NORMAL
DEPRECATED RDW RBC AUTO: 61.6 FL (ref 37–54)
ERYTHROCYTE [DISTWIDTH] IN BLOOD BY AUTOMATED COUNT: 20.4 % (ref 12.3–15.4)
GFR SERPL CREATININE-BSD FRML MDRD: 48 ML/MIN/1.73
GLUCOSE BLDC GLUCOMTR-MCNC: 125 MG/DL (ref 70–130)
GLUCOSE BLDC GLUCOMTR-MCNC: 128 MG/DL (ref 70–130)
GLUCOSE BLDC GLUCOMTR-MCNC: 140 MG/DL (ref 70–130)
GLUCOSE BLDC GLUCOMTR-MCNC: 172 MG/DL (ref 70–130)
GLUCOSE SERPL-MCNC: 124 MG/DL (ref 65–99)
HCT VFR BLD AUTO: 26.2 % (ref 37.5–51)
HCT VFR BLD AUTO: 26.3 % (ref 37.5–51)
HCT VFR BLD AUTO: 27.6 % (ref 37.5–51)
HGB BLD-MCNC: 8.2 G/DL (ref 13–17.7)
HGB BLD-MCNC: 8.5 G/DL (ref 13–17.7)
HGB BLD-MCNC: 8.5 G/DL (ref 13–17.7)
INR PPP: 1.98 (ref 0.9–1.1)
MCH RBC QN AUTO: 25.8 PG (ref 26.6–33)
MCHC RBC AUTO-ENTMCNC: 30.8 G/DL (ref 31.5–35.7)
MCV RBC AUTO: 83.9 FL (ref 79–97)
PHOSPHATE SERPL-MCNC: 3 MG/DL (ref 2.5–4.5)
PLATELET # BLD AUTO: 182 10*3/MM3 (ref 140–450)
PMV BLD AUTO: 12.1 FL (ref 6–12)
POTASSIUM SERPL-SCNC: 4.4 MMOL/L (ref 3.5–5.2)
PROTHROMBIN TIME: 22.3 SECONDS (ref 11.7–14.2)
RBC # BLD AUTO: 3.29 10*6/MM3 (ref 4.14–5.8)
SODIUM SERPL-SCNC: 144 MMOL/L (ref 136–145)
UNIT  ABO: NORMAL
UNIT  RH: NORMAL
WBC # BLD AUTO: 10.92 10*3/MM3 (ref 3.4–10.8)

## 2021-11-07 PROCEDURE — 63710000001 INSULIN LISPRO (HUMAN) PER 5 UNITS: Performed by: NURSE PRACTITIONER

## 2021-11-07 PROCEDURE — 99232 SBSQ HOSP IP/OBS MODERATE 35: CPT | Performed by: INTERNAL MEDICINE

## 2021-11-07 PROCEDURE — 85018 HEMOGLOBIN: CPT | Performed by: HOSPITALIST

## 2021-11-07 PROCEDURE — 82962 GLUCOSE BLOOD TEST: CPT

## 2021-11-07 PROCEDURE — 85014 HEMATOCRIT: CPT | Performed by: HOSPITALIST

## 2021-11-07 PROCEDURE — 99232 SBSQ HOSP IP/OBS MODERATE 35: CPT | Performed by: NURSE PRACTITIONER

## 2021-11-07 PROCEDURE — 85027 COMPLETE CBC AUTOMATED: CPT | Performed by: HOSPITALIST

## 2021-11-07 PROCEDURE — 85610 PROTHROMBIN TIME: CPT | Performed by: HOSPITALIST

## 2021-11-07 PROCEDURE — 36415 COLL VENOUS BLD VENIPUNCTURE: CPT | Performed by: HOSPITALIST

## 2021-11-07 PROCEDURE — 80069 RENAL FUNCTION PANEL: CPT | Performed by: NURSE PRACTITIONER

## 2021-11-07 RX ADMIN — DIGOXIN 125 MCG: 250 TABLET ORAL at 09:00

## 2021-11-07 RX ADMIN — GUAIFENESIN 600 MG: 600 TABLET, EXTENDED RELEASE ORAL at 09:01

## 2021-11-07 RX ADMIN — MAGNESIUM OXIDE 400 MG (241.3 MG MAGNESIUM) TABLET 400 MG: TABLET at 20:12

## 2021-11-07 RX ADMIN — SODIUM BICARBONATE 1950 MG: 650 TABLET ORAL at 09:01

## 2021-11-07 RX ADMIN — FERROUS SULFATE TAB 325 MG (65 MG ELEMENTAL FE) 325 MG: 325 (65 FE) TAB at 09:00

## 2021-11-07 RX ADMIN — GUAIFENESIN 600 MG: 600 TABLET, EXTENDED RELEASE ORAL at 20:12

## 2021-11-07 RX ADMIN — PANTOPRAZOLE SODIUM 40 MG: 40 INJECTION, POWDER, FOR SOLUTION INTRAVENOUS at 09:00

## 2021-11-07 RX ADMIN — PANTOPRAZOLE SODIUM 40 MG: 40 INJECTION, POWDER, FOR SOLUTION INTRAVENOUS at 20:13

## 2021-11-07 RX ADMIN — METOPROLOL SUCCINATE 25 MG: 25 TABLET, EXTENDED RELEASE ORAL at 09:01

## 2021-11-07 RX ADMIN — MAGNESIUM OXIDE 400 MG (241.3 MG MAGNESIUM) TABLET 400 MG: TABLET at 09:01

## 2021-11-07 RX ADMIN — SODIUM BICARBONATE 1950 MG: 650 TABLET ORAL at 20:12

## 2021-11-07 RX ADMIN — ATORVASTATIN CALCIUM 40 MG: 20 TABLET, FILM COATED ORAL at 09:01

## 2021-11-07 RX ADMIN — INSULIN LISPRO 2 UNITS: 100 INJECTION, SOLUTION INTRAVENOUS; SUBCUTANEOUS at 20:39

## 2021-11-07 NOTE — PROGRESS NOTES
[facility] Consult Note          PATIENT IDENTIFICATION:   Name:  Francisco Sequeira      MRN:  [id number]     85 y.o.  male               SUBJECTIVE:   Feels the best he has since admission; drinking better. Still with multiple loose, dark stools. Breathing is comfortable on room air. Abdominal pain better.     OBJECTIVE:  Vitals:    11/06/21 1501 11/06/21 1914 11/06/21 2312 11/07/21 0753   BP: 135/59 119/62 117/70 131/75   BP Location:  Right arm Right arm Right arm   Patient Position:  Lying Sitting Lying   Pulse: 75 78 75 87   Resp: 20 18 18 20   Temp: 97.7 °F (36.5 °C) 97.4 °F (36.3 °C) 97.9 °F (36.6 °C) 97.6 °F (36.4 °C)   TempSrc:  Oral Oral Oral   SpO2: 96% 95% 97% 96%   Weight:       Height:               Body mass index is 24.41 kg/m².    Intake/Output Summary (Last 24 hours) at 11/7/2021 1105  Last data filed at 11/7/2021 1103  Gross per 24 hour   Intake 549.17 ml   Output 1150 ml   Net -600.83 ml     Wt Readings from Last 1 Encounters:   11/02/21 0344 81.6 kg (180 lb)     Wt Readings from Last 3 Encounters:   11/02/21 81.6 kg (180 lb)   10/24/21 85.3 kg (188 lb)   07/30/21 86.2 kg (190 lb)       Physical Exam:  NAD; pleasant; partially oriented   Pale; looks stated age  MMM; AT/NC   No eye discharge; no scleral icterus  No JVD; no carotid bruits  Decreased breath sounds in bases bilat; not labored on RA  Irregularly irregular, metal click, 2/6M  Firm, +T but no G or R, +D, BS+  Trace to +1 LE edema  No clubbing  No asterixis  Moves all extremities   Mood and affect are normal    Scheduled meds:    atorvastatin, 40 mg, Oral, Daily  dextrose, 25 g, Intravenous, Once  digoxin, 125 mcg, Oral, Daily  ferrous sulfate, 325 mg, Oral, Daily With Breakfast  guaiFENesin, 600 mg, Oral, Q12H  insulin lispro, 0-9 Units, Subcutaneous, 4x Daily With Meals & Nightly  magnesium oxide, 400 mg, Oral, BID  metoprolol succinate XL, 25 mg, Oral, Daily  pantoprazole, 40 mg, Intravenous, Q12H  sodium  bicarbonate, 1,950 mg, Oral, BID      IV meds:                             Data Review:    Results from last 7 days   Lab Units 11/07/21  0538 11/06/21  0550 11/05/21  1000 11/04/21  0608 11/04/21  0608 11/03/21 0447 11/03/21 0447   SODIUM mmol/L 144 146* 144   < > 141   < > 139   POTASSIUM mmol/L 4.4 4.4 5.0   < > 5.0   < > 5.0   CHLORIDE mmol/L 113* 113* 111*   < > 109*   < > 107   CO2 mmol/L 20.6* 21.9* 20.5*   < > 24.3   < > 23.2   BUN mg/dL 44* 49* 49*   < > 40*   < > 35*   CREATININE mg/dL 1.40* 1.87* 2.06*   < > 1.89*   < > 1.55*   CALCIUM mg/dL 7.9* 7.9* 8.0*   < > 8.0*   < > 8.2*   BILIRUBIN mg/dL  --   --  0.7  --  1.0  --  0.4   ALK PHOS U/L  --   --  79  --  76  --  89   ALT (SGPT) U/L  --   --  18  --  16  --  17   AST (SGOT) U/L  --   --  31  --  19  --  18   GLUCOSE mg/dL 124* 141* 184*   < > 151*   < > 261*    < > = values in this interval not displayed.     Estimated Creatinine Clearance: 46.1 mL/min (A) (by C-G formula based on SCr of 1.4 mg/dL (H)).  Results from last 7 days   Lab Units 11/05/21  1808   SODIUM UR mmol/L 26   CREATININE UR mg/dL 91.0     Results from last 7 days   Lab Units 11/07/21  0538 11/06/21  0550 11/05/21  1000 11/04/21 0608 11/03/21 0447   MAGNESIUM mg/dL  --   --  2.6* 2.3 2.4   PHOSPHORUS mg/dL 3.0 3.1  --   --   --        Results from last 7 days   Lab Units 11/07/21  0538 11/06/21  2246 11/06/21  1805 11/06/21  0550 11/06/21  0012 11/05/21  1758 11/05/21  1000 11/04/21  1156 11/04/21  0608 11/03/21  1211 11/03/21  0447   WBC 10*3/mm3 10.92*  --   --  12.45*  --   --  16.08*  --  14.55*  --  7.47   HEMOGLOBIN g/dL 8.5* 8.4* 8.2* 6.9* 7.0*   < > 7.6*  7.6*   < > 8.0*   < > 7.9*  7.9*   PLATELETS 10*3/mm3 182  --   --  145  --   --  265  --  130*  --  151    < > = values in this interval not displayed.       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 BYRON on CKD3, non-oliguric, better and back to baseline:  prerenal due to hypovolemia, blood loss, hypotension, and recent contrast exposure on 11/2/21.  Bladder scans are normal.  Likely NAGMA; K fine.   2. Acute GI bleed with bloody stools noted this admission and tagged RBC scan showing bleed in the RLQ consistent with distal ileum or proximal colon. Colonoscopy planned when INR is sub-therapeutic.  3. Acute blood loss anemia related to GI bleed. He has received 6 units PRBCs this admission.  4. History of mechanical AVR and Atrial Fibrillation anticoagulated with warfarin. His warfarin is being held and IV heparin drip infusing  5. Hypertension, controlled  6. Recurrent right pleural effusion; transudate per last thoracentesis. Repeat thoracentesis planned when more stable. Home lasix of 40 mg daily is being held.   7. Type II diabetes. Hgb A1c 5.2 last admission.  8. History of CVA with left side hemiparesis        PLAN:  1. Continue sodium bicarbonate  2. Endoscopy soon    Diane Parker, APRN  11/7/2021  11:05 EST     I examined this patient, reviewed the data, and personally edited this note.  I agree with the assessment and plan as outlined above.  --Ry Martin MD  11/07/21  11:05 EST

## 2021-11-07 NOTE — PROGRESS NOTES
Name: Francisco Sequeira ADMIT: 2021   : 1935  PCP: Rubin Hinkle APRN    MRN: [id number] LOS: 5 days   AGE/SEX: 85 y.o. male  ROOM: [facility]     Subjective   Subjective    No new issues.  Less pain RLQ.  Still having black stools.    Review of Systems   Constitutional: Positive for fatigue.   Gastrointestinal: Positive for abdominal pain.   Genitourinary: Positive for decreased urine volume. Negative for difficulty urinating.        Objective   Objective   Vital Signs  Temp:  [97.4 °F (36.3 °C)-97.9 °F (36.6 °C)] 97.6 °F (36.4 °C)  Heart Rate:  [66-87] 87  Resp:  [18-20] 20  BP: (116-135)/(58-75) 131/75  SpO2:  [95 %-99 %] 96 %  on   ;   Device (Oxygen Therapy): room air  Body mass index is 24.41 kg/m².  Physical Exam  Vitals and nursing note reviewed. Exam conducted with a chaperone present (wife).   Constitutional:       General: He is not in acute distress.     Appearance: He is ill-appearing. He is not toxic-appearing or diaphoretic.   HENT:      Head: Normocephalic and atraumatic.   Eyes:      General: No scleral icterus.     Extraocular Movements: Extraocular movements intact.      Conjunctiva/sclera: Conjunctivae normal.   Cardiovascular:      Rate and Rhythm: Normal rate. Rhythm irregularly irregular.      Pulses: Normal pulses.      Comments: Mechanical s2  Pulmonary:      Effort: Pulmonary effort is normal. No respiratory distress.      Breath sounds: Normal breath sounds. No wheezing or rales.      Comments: Decreased BS right base  Abdominal:      General: Bowel sounds are normal. There is no distension.      Palpations: Abdomen is soft.      Tenderness: There is abdominal tenderness (RLQ). There is guarding (voluntary). There is no rebound.   Musculoskeletal:         General: No swelling or deformity. Normal range of motion.      Cervical back: Neck supple. No rigidity.   Lymphadenopathy:      Cervical: No cervical adenopathy.   Skin:     General: Skin is warm and dry.      Capillary  Refill: Capillary refill takes less than 2 seconds.      Coloration: Skin is pale.      Findings: Bruising (Right abdomen wall) present.   Neurological:      Mental Status: He is alert and oriented to person, place, and time. Mental status is at baseline.      Motor: Weakness (very mild chronic left weakness, at baseline) present.   Psychiatric:         Mood and Affect: Mood normal.         Behavior: Behavior normal.         Results Review     I reviewed the patient's new clinical results.  Results from last 7 days   Lab Units 11/07/21  0538 11/06/21  2246 11/06/21  1805 11/06/21  0550 11/05/21  1758 11/05/21  1000 11/04/21  1156 11/04/21  0608   WBC 10*3/mm3 10.92*  --   --  12.45*  --  16.08*  --  14.55*   HEMOGLOBIN g/dL 8.5* 8.4* 8.2* 6.9*   < > 7.6*  7.6*   < > 8.0*   PLATELETS 10*3/mm3 182  --   --  145  --  265  --  130*    < > = values in this interval not displayed.     Results from last 7 days   Lab Units 11/07/21  0538 11/06/21  0550 11/05/21  1000 11/04/21  0608   SODIUM mmol/L 144 146* 144 141   POTASSIUM mmol/L 4.4 4.4 5.0 5.0   CHLORIDE mmol/L 113* 113* 111* 109*   CO2 mmol/L 20.6* 21.9* 20.5* 24.3   BUN mg/dL 44* 49* 49* 40*   CREATININE mg/dL 1.40* 1.87* 2.06* 1.89*   GLUCOSE mg/dL 124* 141* 184* 151*   EGFR IF NONAFRICN AM mL/min/1.73 48* 35* 31* 34*     Results from last 7 days   Lab Units 11/07/21  0538 11/06/21  0550 11/05/21  1000 11/04/21  0608 11/03/21  0447 11/03/21  0447 11/02/21  0324 11/02/21  0324   ALBUMIN g/dL 2.60* 2.50* 2.60* 2.60*   < > 3.00*   < > 3.30*   BILIRUBIN mg/dL  --   --  0.7 1.0  --  0.4  --  0.3   ALK PHOS U/L  --   --  79 76  --  89  --  92   AST (SGOT) U/L  --   --  31 19  --  18  --  23   ALT (SGPT) U/L  --   --  18 16  --  17  --  21    < > = values in this interval not displayed.     Results from last 7 days   Lab Units 11/07/21  0538 11/06/21  0550 11/05/21  1000 11/04/21  0608 11/03/21  0447 11/03/21  0447   CALCIUM mg/dL 7.9* 7.9* 8.0* 8.0*   < > 8.2*   ALBUMIN  g/dL 2.60* 2.50* 2.60* 2.60*   < > 3.00*   MAGNESIUM mg/dL  --   --  2.6* 2.3  --  2.4   PHOSPHORUS mg/dL 3.0 3.1  --   --   --   --     < > = values in this interval not displayed.     Results from last 7 days   Lab Units 11/02/21  0324   PROCALCITONIN ng/mL 0.12     Results from last 7 days   Lab Units 11/07/21  0537 11/06/21  0550 11/05/21  1000 11/04/21  0608 11/03/21  0447 11/02/21  0324 11/01/21  0941   PROTIME Seconds 22.3* 22.6* 24.9* 26.3* 25.0* 23.7* 20.5*   INR  1.98* 2.02* 2.28* 2.45* 2.29* 2.14* 1.78*     Glucose   Date/Time Value Ref Range Status   11/07/2021 0615 128 70 - 130 mg/dL Final     Comment:     Meter: [id number] : [id number] He Milleril NA   11/06/2021 2044 168 (H) 70 - 130 mg/dL Final     Comment:     Meter: [id number] : [id number] He Collinsgail NA   11/06/2021 1654 114 70 - 130 mg/dL Final     Comment:     Meter: [id number] : [id number] Michele Salgado NA   11/06/2021 1207 153 (H) 70 - 130 mg/dL Final     Comment:     Meter: [id number] : [id number] Michele Salgado NA   11/06/2021 0606 153 (H) 70 - 130 mg/dL Final     Comment:     Meter: [id number] : [id number] Dacia Fraser MELO   11/05/2021 2207 183 (H) 70 - 130 mg/dL Final     Comment:     Meter: [id number] : [id number] Dacia Fraser NA   11/05/2021 1606 199 (H) 70 - 130 mg/dL Final     Comment:     Meter: [id number] : [id number] Jm ALEJO       CT Abdomen Pelvis Without Contrast  11/4  CONCLUSION:  1. Right-sided pleural effusion appears slightly larger compared to the  previous examination, new small left-sided pleural effusion has  developed. There is diffuse body wall edema and cardiac enlargement.  Small amount of free fluid is demonstrated within the peritoneal cavity.  2. Sizable bilobed collection has developed within the right rectus  abdominis muscle, consistent with hematoma. See above dimensions.  3. Diverticulosis of the colon, subtle pericolonic induration about the  mid sigmoid suggests possible  "diverticulitis.  4. Urine demonstrates increased density, perhaps related to residual  contrast from previous CT or hemorrhage. Correlation with urinalysis  advised.  5. Gallstones.        Scheduled Medications  atorvastatin, 40 mg, Oral, Daily  dextrose, 25 g, Intravenous, Once  digoxin, 125 mcg, Oral, Daily  ferrous sulfate, 325 mg, Oral, Daily With Breakfast  guaiFENesin, 600 mg, Oral, Q12H  insulin lispro, 0-9 Units, Subcutaneous, 4x Daily With Meals & Nightly  magnesium oxide, 400 mg, Oral, BID  metoprolol succinate XL, 25 mg, Oral, Daily  pantoprazole, 40 mg, Intravenous, Q12H  sodium bicarbonate, 1,950 mg, Oral, BID    Infusions   Diet  Diet Clear Liquid; Thin       Assessment/Plan     Active Hospital Problems    Diagnosis  POA   • **Lower GI bleed [K92.2]  Yes   • Rectus sheath hematoma [S30.1XXA]  No   • Acute posthemorrhagic anemia [D62]  Yes   • History of nephrectomy [Z90.5]  Not Applicable   • Abnormal urinalysis [R82.90]  Yes   • Recurrent right pleural effusion [J90]  Yes   • 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       86 yo gentleman with h/o DM2, AFib, Mech AVR (on warfarin), HTN, CKD3, h/o RCC s/p right nephrectomy and h/o CVA with mild residual left hemiparesis, admitted here from 10/23 to 10/30 for \"obscure\" GI bleed and bilateral pleural effusions (transudate), who presented to ER with c/o rectal bleeding and generalized weakness. Found to have drop in Hgb from 9.3 " to 7.2. Stool grossly bloody in ER.     Hemoglobin better today since transfusion yesterday.  Will continue to monitor but hopefully will not require additional blood today.  Renal function slowly improving.  INR slowly drifting down.  Cardiology following.  GI planning colonoscopy in the next day or 2.  Renal function improving.      Pulmonology following, may need thoracentesis.  ST recommends VFSS when GI issues resolved.  BS relatively stable on SSI.      SCDs for DVT prophylaxis.  Full code.  Discussed with patient, family and consulting provider.  Anticipate discharge home with HH vs SNU facility next week.      Santos Guzman MD  [facility]  11/07/21  08:39 EST

## 2021-11-07 NOTE — PLAN OF CARE
Goal Outcome Evaluation:     VSS. Hgb 8.5 today .Pt more alert today, reports feeling a lot better.improved  PO intake, Loose Bm*3 today , stool dark.Denies any pain/discomfort ,possible scope in 1-2 days per MD.Family at bed side ,WCTM.

## 2021-11-07 NOTE — PLAN OF CARE
Problem: Adult Inpatient Plan of Care  Goal: Plan of Care Review  Outcome: Ongoing, Progressing  Flowsheets (Taken 11/7/2021 3461)  Plan of Care Reviewed With: patient  Outcome Summary: VSS. Pt HGB improved after 1 unit PRBC. Large dark stool tonight but no new onset. Pt denies pain or shortness of breath. Clear liquid diet. GI following and possible EGD/Scope while admitted but pending INR. Coumadin on hold. Labs ordered for AM. Will continue to monitor.   Goal Outcome Evaluation:  Plan of Care Reviewed With: patient           Outcome Summary: VSS. Pt HGB improved after 1 unit PRBC. Large dark stool tonight but no new onset. Pt denies pain or shortness of breath. Clear liquid diet. GI following and possible EGD/Scope while admitted but pending INR. Coumadin on hold. Labs ordered for AM. Will continue to monitor.

## 2021-11-07 NOTE — PROGRESS NOTES
[facility]  Inpatient Progress Note    Reason for Follow Up:  GI bleeding    Subjective     Interval History:   Dark stool o/n but hb stable.      Current Facility-Administered Medications:   •  acetaminophen (TYLENOL) tablet 650 mg, 650 mg, Oral, Q4H PRN **OR** [DISCONTINUED] acetaminophen (TYLENOL) 160 MG/5ML solution 650 mg, 650 mg, Oral, Q4H PRN **OR** [DISCONTINUED] acetaminophen (TYLENOL) suppository 650 mg, 650 mg, Rectal, Q4H PRN, Carmen Welch APRN  •  atorvastatin (LIPITOR) tablet 40 mg, 40 mg, Oral, Daily, Latrice Barfield APRN, 40 mg at 11/06/21 0949  •  dextrose (D50W) (25 g/50 mL) IV injection 25 g, 25 g, Intravenous, Q15 Min PRN, Carmen Welch APRN  •  dextrose (D50W) (25 g/50 mL) IV injection 25 g, 25 g, Intravenous, Once, Kirk Mcdonald III, PA  •  dextrose (GLUTOSE) oral gel 15 g, 15 g, Oral, Q15 Min PRN, Carmen Welch APRN  •  digoxin (LANOXIN) tablet 125 mcg, 125 mcg, Oral, Daily, Latrice Barfield APRN, 125 mcg at 11/06/21 0949  •  diphenhydrAMINE (BENADRYL) capsule 25 mg, 25 mg, Oral, BID PRN, Latrice Barfield APRN  •  ferrous sulfate tablet 325 mg, 325 mg, Oral, Daily With Breakfast, Latrice Barfield APRN, 325 mg at 11/06/21 0950  •  glucagon (human recombinant) (GLUCAGEN DIAGNOSTIC) injection 1 mg, 1 mg, Subcutaneous, PRN, Carmen Welch APRN  •  guaiFENesin (MUCINEX) 12 hr tablet 600 mg, 600 mg, Oral, Q12H, Latrice Barfield APRN, 600 mg at 11/06/21 2005  •  HYDROcodone-acetaminophen (NORCO) 5-325 MG per tablet 1 tablet, 1 tablet, Oral, Q6H PRN, Grant Yin MD, 1 tablet at 11/02/21 2347  •  insulin lispro (ADMELOG) injection 0-9 Units, 0-9 Units, Subcutaneous, 4x Daily With Meals & Nightly, Carmen Welch APRN, 2 Units at 11/06/21 2226  •  magnesium oxide (MAG-OX) tablet 400 mg, 400 mg, Oral, BID, Latrice Barfield APRN, 400 mg at 11/06/21 2004  •  metoprolol succinate XL (TOPROL-XL) 24 hr tablet  25 mg, 25 mg, Oral, Daily, Latrice Barfield APRN, 25 mg at 11/06/21 0949  •  nitroglycerin (NITROSTAT) SL tablet 0.4 mg, 0.4 mg, Sublingual, Q5 Min PRN, Carmen Welch APRN  •  ondansetron (ZOFRAN) tablet 4 mg, 4 mg, Oral, Q6H PRN **OR** ondansetron (ZOFRAN) injection 4 mg, 4 mg, Intravenous, Q6H PRN, Carmen Welch APRN, 4 mg at 11/04/21 0008  •  pantoprazole (PROTONIX) injection 40 mg, 40 mg, Intravenous, Q12H, Carmen Welch APRN, 40 mg at 11/06/21 2003  •  sodium bicarbonate tablet 1,950 mg, 1,950 mg, Oral, BID, Latriec Barfield APRN, 1,950 mg at 11/06/21 2003  Review of Systems:    All systems were reviewed and negative except for:  Gastrointestinal: positive for  melena    Objective     Vital Signs  Temp:  [97.4 °F (36.3 °C)-97.9 °F (36.6 °C)] 97.6 °F (36.4 °C)  Heart Rate:  [66-87] 87  Resp:  [18-20] 20  BP: (116-135)/(58-75) 131/75  Body mass index is 24.41 kg/m².    Intake/Output Summary (Last 24 hours) at 11/7/2021 0820  Last data filed at 11/7/2021 0452  Gross per 24 hour   Intake 429.17 ml   Output 850 ml   Net -420.83 ml     No intake/output data recorded.     Physical Exam:   General: patient awake, alert and cooperative   Abdomen: soft, nontender, nondistended; normal bowel sounds   Rectal: deferred   Extremities: no rash or edema   Psychiatric: Normal mood and behavior; memory intact     Results Review:     I reviewed the patient's new clinical results.    Results from last 7 days   Lab Units 11/07/21  0538 11/06/21  2246 11/06/21  1805 11/06/21  0550 11/06/21  0550 11/05/21  1758 11/05/21  1000   WBC 10*3/mm3 10.92*  --   --   --  12.45*  --  16.08*   HEMOGLOBIN g/dL 8.5* 8.4* 8.2*   < > 6.9*   < > 7.6*  7.6*   HEMATOCRIT % 27.6* 25.9* 24.9*   < > 21.9*   < > 23.2*  23.2*   PLATELETS 10*3/mm3 182  --   --   --  145  --  265    < > = values in this interval not displayed.     Results from last 7 days   Lab Units 11/07/21  0538 11/06/21  0550 11/05/21  1000  11/04/21  0608 11/04/21  0608 11/03/21  0447 11/03/21  0447   SODIUM mmol/L 144 146* 144   < > 141   < > 139   POTASSIUM mmol/L 4.4 4.4 5.0   < > 5.0   < > 5.0   CHLORIDE mmol/L 113* 113* 111*   < > 109*   < > 107   CO2 mmol/L 20.6* 21.9* 20.5*   < > 24.3   < > 23.2   BUN mg/dL 44* 49* 49*   < > 40*   < > 35*   CREATININE mg/dL 1.40* 1.87* 2.06*   < > 1.89*   < > 1.55*   CALCIUM mg/dL 7.9* 7.9* 8.0*   < > 8.0*   < > 8.2*   BILIRUBIN mg/dL  --   --  0.7  --  1.0  --  0.4   ALK PHOS U/L  --   --  79  --  76  --  89   ALT (SGPT) U/L  --   --  18  --  16  --  17   AST (SGOT) U/L  --   --  31  --  19  --  18   GLUCOSE mg/dL 124* 141* 184*   < > 151*   < > 261*    < > = values in this interval not displayed.     Results from last 7 days   Lab Units 11/07/21  0537 11/06/21  0550 11/05/21  1000   INR  1.98* 2.02* 2.28*     No results found for: LIPASE      Assessment/Plan   Assessment:   1. Obscure overt GI bleeding  2. ABLA  3. Mechanical heart valve on wafarin (held)  4. Abnormal tagged RBC scan    Plan:   Hb stable.  INR is downtrending but still 2 today.  Will reassess tomorrow, hopefully can prep tomorrow evening and plan for repeat cscope on Tuesday.      I discussed the patients findings and my recommendations with patient.         Dinesh Meza M.D.  [facility]  [address] [place] [address]  Office: [phone]

## 2021-11-07 NOTE — PROGRESS NOTES
LOS: 5 days   Patient Care Team:  Rubin Hinkle APRN as PCP - General (Family Medicine)  Adura Vazquez RODDY as Pharmacist  Vasquez Niño PharmD as Pharmacist (Pharmacy)      Chief Complaint: Follow-up CHF, mechanical aortic valve, GI bleed       Interval History: He is awake this morning.  Denies any complaints.  Wife is concerned they won't find anything during his colonoscopy again.      Objective   Vital Signs  Temp:  [97.4 °F (36.3 °C)-97.9 °F (36.6 °C)] 97.6 °F (36.4 °C)  Heart Rate:  [66-87] 87  Resp:  [18-20] 20  BP: (116-135)/(58-75) 131/75    Intake/Output Summary (Last 24 hours) at 11/7/2021 0942  Last data filed at 11/7/2021 0858  Gross per 24 hour   Intake 549.17 ml   Output 850 ml   Net -300.83 ml           Constitutional:       General: Not in acute distress.     Appearance: Well-developed. Frail. Not diaphoretic.   Eyes:      Pupils: Pupils are equal, round, and reactive to light.   HENT:      Head: Normocephalic and atraumatic.   Neck:      Thyroid: No thyromegaly.      Vascular: No JVD.   Pulmonary:      Effort: Pulmonary effort is normal. No respiratory distress.      Breath sounds: Normal breath sounds.   Cardiovascular:      Normal rate. Regular rhythm.      Comments: Crisp valve click    Pulses:     Intact distal pulses.   Abdominal:      General: Bowel sounds are normal. There is no distension.      Palpations: Abdomen is soft. There is no hepatomegaly or splenomegaly.      Tenderness: There is no abdominal tenderness.   Musculoskeletal: Normal range of motion. Skin:     General: Skin is warm and dry.      Coloration: Skin is pale.      Findings: No erythema.   Neurological:      Mental Status: Alert and oriented to person, place, and time.   Psychiatric:         Behavior: Behavior normal.         Judgment: Judgment normal.         Results Review:      Results from last 7 days   Lab Units 11/07/21  0538 11/06/21  0550 11/06/21  0550 11/05/21  1000 11/05/21  1000   SODIUM mmol/L 144  --  146*   --  144   POTASSIUM mmol/L 4.4  --  4.4  --  5.0   CHLORIDE mmol/L 113*  --  113*  --  111*   CO2 mmol/L 20.6*  --  21.9*  --  20.5*   BUN mg/dL 44*  --  49*  --  49*   CREATININE mg/dL 1.40*  --  1.87*  --  2.06*   GLUCOSE mg/dL 124*   < > 141*   < > 184*   CALCIUM mg/dL 7.9*  --  7.9*  --  8.0*    < > = values in this interval not displayed.         Results from last 7 days   Lab Units 11/07/21  0538 11/06/21  2246 11/06/21  1805 11/06/21  0550 11/06/21  0550 11/05/21  1758 11/05/21  1000   WBC 10*3/mm3 10.92*  --   --   --  12.45*  --  16.08*   HEMOGLOBIN g/dL 8.5* 8.4* 8.2*   < > 6.9*   < > 7.6*  7.6*   HEMATOCRIT % 27.6* 25.9* 24.9*   < > 21.9*   < > 23.2*  23.2*   PLATELETS 10*3/mm3 182  --   --   --  145  --  265    < > = values in this interval not displayed.     Results from last 7 days   Lab Units 11/07/21  0537 11/06/21  0550 11/05/21  1000 11/03/21  0447 11/02/21  0324   INR  1.98* 2.02* 2.28*   < > 2.14*   APTT seconds  --   --   --   --  70.3*    < > = values in this interval not displayed.         Results from last 7 days   Lab Units 11/05/21  1000   MAGNESIUM mg/dL 2.6*           I reviewed the patient's new clinical results.  I personally viewed and interpreted the patient's EKG/Telemetry data        Medication Review:   atorvastatin, 40 mg, Oral, Daily  dextrose, 25 g, Intravenous, Once  digoxin, 125 mcg, Oral, Daily  ferrous sulfate, 325 mg, Oral, Daily With Breakfast  guaiFENesin, 600 mg, Oral, Q12H  insulin lispro, 0-9 Units, Subcutaneous, 4x Daily With Meals & Nightly  magnesium oxide, 400 mg, Oral, BID  metoprolol succinate XL, 25 mg, Oral, Daily  pantoprazole, 40 mg, Intravenous, Q12H  sodium bicarbonate, 1,950 mg, Oral, BID             Assessment/Plan       Lower GI bleed    Atrial fibrillation (HCC)    Hypertension    Uncontrolled type 2 diabetes mellitus with complication, with long-term current use of insulin (HCC)    Hx of aortic valve replacement, mechanical [Z95.2]    Stage 3b  chronic kidney disease (HCC)    Chronic anticoagulation    History of nephrectomy    Abnormal urinalysis    Recurrent right pleural effusion    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  Anemia/lower GIB.  Status post transfusion x 6.  Active bleeding on tagged red blood cell scan.  INR 1.98 today.  I'm not sure where GI would like his INR, but may be able to begin prep tonight.    2.  Atrial fibrillation.  Rate controlled with Toprol and digoxin.  Dig level stable.  Warfarin on hold.  3.  Mechanical aortic valve. Allowing INR to drift down naturally.  Prefer avoiding FFP so long as he remains hemodynamically stable.  Lovenox bridge not recommended in presence of active bleed.  4.  History of CVA.  Stroke occurred on a therapeutic INR.    5.  Recurrent right pleural effusion.  Status post thoracentesis on 10/25 with 1650 cc of transudative fluid removed.  6.  Rectal sheath hematoma.  No indication for surgical intervention.      Gabriela Cabrales, APRN  11/07/21  09:42 EST

## 2021-11-08 ENCOUNTER — APPOINTMENT (OUTPATIENT)
Dept: GENERAL RADIOLOGY | Facility: HOSPITAL | Age: 86
End: 2021-11-08

## 2021-11-08 LAB
ALBUMIN SERPL-MCNC: 2.5 G/DL (ref 3.5–5.2)
ANION GAP SERPL CALCULATED.3IONS-SCNC: 8 MMOL/L (ref 5–15)
BUN SERPL-MCNC: 37 MG/DL (ref 8–23)
BUN/CREAT SERPL: 27.2 (ref 7–25)
CALCIUM SPEC-SCNC: 7.7 MG/DL (ref 8.6–10.5)
CHLORIDE SERPL-SCNC: 112 MMOL/L (ref 98–107)
CO2 SERPL-SCNC: 22 MMOL/L (ref 22–29)
CREAT SERPL-MCNC: 1.36 MG/DL (ref 0.76–1.27)
DEPRECATED RDW RBC AUTO: 59.7 FL (ref 37–54)
ERYTHROCYTE [DISTWIDTH] IN BLOOD BY AUTOMATED COUNT: 20.5 % (ref 12.3–15.4)
GFR SERPL CREATININE-BSD FRML MDRD: 50 ML/MIN/1.73
GLUCOSE BLDC GLUCOMTR-MCNC: 129 MG/DL (ref 70–130)
GLUCOSE BLDC GLUCOMTR-MCNC: 132 MG/DL (ref 70–130)
GLUCOSE BLDC GLUCOMTR-MCNC: 162 MG/DL (ref 70–130)
GLUCOSE BLDC GLUCOMTR-MCNC: 174 MG/DL (ref 70–130)
GLUCOSE SERPL-MCNC: 116 MG/DL (ref 65–99)
HCT VFR BLD AUTO: 26.3 % (ref 37.5–51)
HCT VFR BLD AUTO: 30.8 % (ref 37.5–51)
HGB BLD-MCNC: 8.4 G/DL (ref 13–17.7)
HGB BLD-MCNC: 9.5 G/DL (ref 13–17.7)
INR PPP: 1.89 (ref 0.9–1.1)
MCH RBC QN AUTO: 25.9 PG (ref 26.6–33)
MCHC RBC AUTO-ENTMCNC: 31.9 G/DL (ref 31.5–35.7)
MCV RBC AUTO: 81.2 FL (ref 79–97)
PHOSPHATE SERPL-MCNC: 2.9 MG/DL (ref 2.5–4.5)
PLATELET # BLD AUTO: 195 10*3/MM3 (ref 140–450)
PMV BLD AUTO: 12 FL (ref 6–12)
POTASSIUM SERPL-SCNC: 4.1 MMOL/L (ref 3.5–5.2)
PROTHROMBIN TIME: 21.5 SECONDS (ref 11.7–14.2)
RBC # BLD AUTO: 3.24 10*6/MM3 (ref 4.14–5.8)
SODIUM SERPL-SCNC: 142 MMOL/L (ref 136–145)
WBC # BLD AUTO: 7.23 10*3/MM3 (ref 3.4–10.8)

## 2021-11-08 PROCEDURE — 63710000001 INSULIN LISPRO (HUMAN) PER 5 UNITS: Performed by: NURSE PRACTITIONER

## 2021-11-08 PROCEDURE — 99232 SBSQ HOSP IP/OBS MODERATE 35: CPT | Performed by: NURSE PRACTITIONER

## 2021-11-08 PROCEDURE — 71045 X-RAY EXAM CHEST 1 VIEW: CPT

## 2021-11-08 PROCEDURE — 82962 GLUCOSE BLOOD TEST: CPT

## 2021-11-08 PROCEDURE — 80069 RENAL FUNCTION PANEL: CPT | Performed by: NURSE PRACTITIONER

## 2021-11-08 PROCEDURE — 85027 COMPLETE CBC AUTOMATED: CPT | Performed by: HOSPITALIST

## 2021-11-08 PROCEDURE — 99232 SBSQ HOSP IP/OBS MODERATE 35: CPT | Performed by: INTERNAL MEDICINE

## 2021-11-08 PROCEDURE — 85014 HEMATOCRIT: CPT | Performed by: HOSPITALIST

## 2021-11-08 PROCEDURE — 85610 PROTHROMBIN TIME: CPT | Performed by: HOSPITALIST

## 2021-11-08 PROCEDURE — 85018 HEMOGLOBIN: CPT | Performed by: HOSPITALIST

## 2021-11-08 RX ORDER — POLYETHYLENE GLYCOL 3350 17 G/17G
1 POWDER, FOR SOLUTION ORAL ONCE
Status: COMPLETED | OUTPATIENT
Start: 2021-11-08 | End: 2021-11-08

## 2021-11-08 RX ADMIN — INSULIN LISPRO 2 UNITS: 100 INJECTION, SOLUTION INTRAVENOUS; SUBCUTANEOUS at 17:49

## 2021-11-08 RX ADMIN — ATORVASTATIN CALCIUM 40 MG: 20 TABLET, FILM COATED ORAL at 08:54

## 2021-11-08 RX ADMIN — GUAIFENESIN 600 MG: 600 TABLET, EXTENDED RELEASE ORAL at 20:47

## 2021-11-08 RX ADMIN — PANTOPRAZOLE SODIUM 40 MG: 40 INJECTION, POWDER, FOR SOLUTION INTRAVENOUS at 20:47

## 2021-11-08 RX ADMIN — MAGNESIUM OXIDE 400 MG (241.3 MG MAGNESIUM) TABLET 400 MG: TABLET at 08:54

## 2021-11-08 RX ADMIN — DIGOXIN 125 MCG: 250 TABLET ORAL at 08:55

## 2021-11-08 RX ADMIN — SODIUM BICARBONATE 1950 MG: 650 TABLET ORAL at 08:54

## 2021-11-08 RX ADMIN — GUAIFENESIN 600 MG: 600 TABLET, EXTENDED RELEASE ORAL at 08:54

## 2021-11-08 RX ADMIN — METOPROLOL SUCCINATE 25 MG: 25 TABLET, EXTENDED RELEASE ORAL at 08:55

## 2021-11-08 RX ADMIN — PANTOPRAZOLE SODIUM 40 MG: 40 INJECTION, POWDER, FOR SOLUTION INTRAVENOUS at 08:56

## 2021-11-08 RX ADMIN — MAGNESIUM OXIDE 400 MG (241.3 MG MAGNESIUM) TABLET 400 MG: TABLET at 20:47

## 2021-11-08 RX ADMIN — SODIUM BICARBONATE 1950 MG: 650 TABLET ORAL at 20:47

## 2021-11-08 RX ADMIN — POLYETHYLENE GLYCOL 3350 1 BOTTLE: 17 POWDER, FOR SOLUTION ORAL at 17:49

## 2021-11-08 RX ADMIN — FERROUS SULFATE TAB 325 MG (65 MG ELEMENTAL FE) 325 MG: 325 (65 FE) TAB at 08:54

## 2021-11-08 NOTE — PROGRESS NOTES
Gastroenterology   Inpatient Progress Note    Reason for Follow Up:  GI bleed    Subjective  Interval History:   Patient having frequent loose dark bowel movements.    He has had 4 already today.  Denies abdominal pain, nausea, vomiting.    Remains on clear liquid diet.      Current Facility-Administered Medications:   •  acetaminophen (TYLENOL) tablet 650 mg, 650 mg, Oral, Q4H PRN **OR** [DISCONTINUED] acetaminophen (TYLENOL) 160 MG/5ML solution 650 mg, 650 mg, Oral, Q4H PRN **OR** [DISCONTINUED] acetaminophen (TYLENOL) suppository 650 mg, 650 mg, Rectal, Q4H PRN, Carmen Welch APRN  •  atorvastatin (LIPITOR) tablet 40 mg, 40 mg, Oral, Daily, Latrice Barfield APRN, 40 mg at 11/07/21 0901  •  dextrose (D50W) (25 g/50 mL) IV injection 25 g, 25 g, Intravenous, Q15 Min PRN, Carmen Welch APRN  •  dextrose (D50W) (25 g/50 mL) IV injection 25 g, 25 g, Intravenous, Once, Kirk Mcdonald III, PA  •  dextrose (GLUTOSE) oral gel 15 g, 15 g, Oral, Q15 Min PRN, Carmen Welch APRN  •  digoxin (LANOXIN) tablet 125 mcg, 125 mcg, Oral, Daily, Latrice Barfield APRN, 125 mcg at 11/07/21 0900  •  diphenhydrAMINE (BENADRYL) capsule 25 mg, 25 mg, Oral, BID PRN, Latrice Barfield APRN  •  ferrous sulfate tablet 325 mg, 325 mg, Oral, Daily With Breakfast, Latrice Barfield APRN, 325 mg at 11/07/21 0900  •  glucagon (human recombinant) (GLUCAGEN DIAGNOSTIC) injection 1 mg, 1 mg, Subcutaneous, PRN, Carmen Welch APRN  •  guaiFENesin (MUCINEX) 12 hr tablet 600 mg, 600 mg, Oral, Q12H, Latrice Barfield APRN, 600 mg at 11/07/21 2012  •  HYDROcodone-acetaminophen (NORCO) 5-325 MG per tablet 1 tablet, 1 tablet, Oral, Q6H PRN, Grant Yin MD, 1 tablet at 11/02/21 2347  •  insulin lispro (ADMELOG) injection 0-9 Units, 0-9 Units, Subcutaneous, 4x Daily With Meals & Nightly, Carmen Welch APRN, 2 Units at 11/07/21 2039  •  magnesium oxide (MAG-OX) tablet 400 mg, 400 mg, Oral, BID,  Latrice Barfield APRN, 400 mg at 11/07/21 2012  •  metoprolol succinate XL (TOPROL-XL) 24 hr tablet 25 mg, 25 mg, Oral, Daily, Latrice Barfield APRN, 25 mg at 11/07/21 0901  •  nitroglycerin (NITROSTAT) SL tablet 0.4 mg, 0.4 mg, Sublingual, Q5 Min PRN, Carmen Welch APRN  •  ondansetron (ZOFRAN) tablet 4 mg, 4 mg, Oral, Q6H PRN **OR** ondansetron (ZOFRAN) injection 4 mg, 4 mg, Intravenous, Q6H PRN, Carmen Welch APRN, 4 mg at 11/04/21 0008  •  pantoprazole (PROTONIX) injection 40 mg, 40 mg, Intravenous, Q12H, Carmen Welch APRN, 40 mg at 11/07/21 2013  •  sodium bicarbonate tablet 1,950 mg, 1,950 mg, Oral, BID, Latrice Barfield APRN, 1,950 mg at 11/07/21 2012  Review of Systems:               Gastroenterology positive for dark stools    Objective     Vital Signs  Temp:  [97.6 °F (36.4 °C)-97.9 °F (36.6 °C)] 97.6 °F (36.4 °C)  Heart Rate:  [75-87] 75  Resp:  [16-20] 17  BP: (118-131)/(54-75) 128/64  Body mass index is 24.41 kg/m².                  Physical Exam:              General: patient awake, alert and cooperative, appears chronically ill, frail              Eyes: no scleral icterus              Skin: warm and dry, not jaundiced              Abdomen: soft, nontender, nondistended; normal bowel sounds              Psychiatric: Appropriate affect and behavior                Results Review:                I reviewed the patient's new clinical results.    Results from last 7 days   Lab Units 11/08/21  0427 11/07/21  2151 11/07/21  1351 11/07/21  0538 11/07/21  0538 11/06/21  1805 11/06/21  0550   WBC 10*3/mm3 7.23  --   --   --  10.92*  --  12.45*   HEMOGLOBIN g/dL 8.4* 8.2* 8.5*   < > 8.5*   < > 6.9*   HEMATOCRIT % 26.3* 26.2* 26.3*   < > 27.6*   < > 21.9*   PLATELETS 10*3/mm3 195  --   --   --  182  --  145    < > = values in this interval not displayed.     Results from last 7 days   Lab Units 11/08/21  0427 11/07/21  0538 11/06/21  0550 11/05/21  1000 11/05/21  1000  11/04/21  0608 11/04/21  0608 11/03/21  0447 11/03/21  0447   SODIUM mmol/L 142 144 146*   < > 144   < > 141   < > 139   POTASSIUM mmol/L 4.1 4.4 4.4   < > 5.0   < > 5.0   < > 5.0   CHLORIDE mmol/L 112* 113* 113*   < > 111*   < > 109*   < > 107   CO2 mmol/L 22.0 20.6* 21.9*   < > 20.5*   < > 24.3   < > 23.2   BUN mg/dL 37* 44* 49*   < > 49*   < > 40*   < > 35*   CREATININE mg/dL 1.36* 1.40* 1.87*   < > 2.06*   < > 1.89*   < > 1.55*   CALCIUM mg/dL 7.7* 7.9* 7.9*   < > 8.0*   < > 8.0*   < > 8.2*   BILIRUBIN mg/dL  --   --   --   --  0.7  --  1.0  --  0.4   ALK PHOS U/L  --   --   --   --  79  --  76  --  89   ALT (SGPT) U/L  --   --   --   --  18  --  16  --  17   AST (SGOT) U/L  --   --   --   --  31  --  19  --  18   GLUCOSE mg/dL 116* 124* 141*   < > 184*   < > 151*   < > 261*    < > = values in this interval not displayed.     Results from last 7 days   Lab Units 11/08/21  0427 11/07/21  0537 11/06/21  0550   INR  1.89* 1.98* 2.02*     No results found for: LIPASE    Radiology:  CT Abdomen Pelvis Without Contrast   Final Result      XR Chest 1 View   Final Result   Moderate right pleural effusion appears decreased from the   prior exam. Atelectasis/infiltrate is apparent in the right lower lung.   Continued follow-up recommended.         This report was finalized on 11/2/2021 4:34 PM by Dr. Chava Luke M.D.          NM GI Blood Loss   Final Result   Active GI bleeding from the right lower quadrant in the   region of either the distal ileum or the proximal ascending colon. I   discussed the case with Dr. Al Gerard at around 3:45 PM on the   day of the exam.       This report was finalized on 11/2/2021 5:51 PM by Dr. Grant Jorge M.D.          CT Abdomen Pelvis With Contrast   Final Result       1. No obvious etiology for the patient's GI bleeding is identified.   Patient does have colonic diverticulosis. There is no diverticulitis.   2. Large right pleural effusion. This may be slightly  smaller than on   prior study. Previously identified left pleural effusion has almost   completely resolved.       Radiation dose reduction techniques were utilized, including automated   exposure control and exposure modulation based on body size.       This report was finalized on 11/2/2021 5:38 AM by Dr. Yue Frye M.D.              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 GI bleed with melena and bright red rectal bleeding  2. Abnormal tagged red blood cell scan positive with activity in the cecum/terminal ileum  3. Acute blood loss anemia, stable  4. Anticoagulated on Coumadin, currently held  5. Abdominal pain, resolved  6. Hypercoagulable, INR trending down, currently 1.89  7. History of mechanical aortic valve  8. Acute kidney injury on chronic kidney disease 3      Plan:  · GI bleed with ongoing melena.  Tagged red blood scan positive with activity in the cecum/terminal ileum.  Colonoscopy less than 2 weeks ago with no evidence of GI bleed or abnormality for anemia noted.   · H&H has been relatively stable, plans for colonoscopy November 9, 2021 for further  evaluation of abnormal tagged red blood cell scan with close evaluation of the right side of the colon any other cause for bleeding      I discussed the patients findings and my recommendations with patient, family and nursing staff.           Jenn DE JESUS  [facility] [place] [id number]  Office: [phone]

## 2021-11-08 NOTE — PROGRESS NOTES
Name: Francisco Sequeira ADMIT: 2021   : 1935  PCP: Rubin Hinkle APRN    MRN: [id number] LOS: 6 days   AGE/SEX: 85 y.o. male  ROOM: [facility]/     Subjective   Subjective    No new issues.  Less pain RLQ.  Still having black stools, rather frequently.    Review of Systems   Constitutional: Positive for fatigue.   Gastrointestinal: Positive for abdominal pain, blood in stool and diarrhea.        Objective   Objective   Vital Signs  Temp:  [97.6 °F (36.4 °C)-97.9 °F (36.6 °C)] 97.6 °F (36.4 °C)  Heart Rate:  [75-80] 80  Resp:  [16-20] 17  BP: (118-128)/(54-72) 128/64  SpO2:  [95 %-98 %] 98 %  on   ;   Device (Oxygen Therapy): room air  Body mass index is 24.41 kg/m².  Physical Exam  Vitals and nursing note reviewed.   Constitutional:       General: He is not in acute distress.     Appearance: He is ill-appearing. He is not toxic-appearing or diaphoretic.   HENT:      Head: Normocephalic and atraumatic.   Eyes:      General: No scleral icterus.     Extraocular Movements: Extraocular movements intact.      Conjunctiva/sclera: Conjunctivae normal.   Cardiovascular:      Rate and Rhythm: Normal rate. Rhythm irregularly irregular.      Pulses: Normal pulses.      Comments: Mechanical s2  Pulmonary:      Effort: Pulmonary effort is normal. No respiratory distress.      Breath sounds: Normal breath sounds. No wheezing or rales.      Comments: Decreased BS right base  Abdominal:      General: Bowel sounds are normal. There is no distension.      Palpations: Abdomen is soft.      Tenderness: There is abdominal tenderness (RLQ). There is guarding (voluntary). There is no rebound.   Musculoskeletal:         General: No swelling or deformity. Normal range of motion.      Cervical back: Neck supple. No rigidity.   Lymphadenopathy:      Cervical: No cervical adenopathy.   Skin:     General: Skin is warm and dry.      Capillary Refill: Capillary refill takes less than 2 seconds.      Coloration: Skin is pale.       Findings: Bruising (Right abdomen wall tracking superiorly down to groin and back) present.   Neurological:      Mental Status: He is alert and oriented to person, place, and time. Mental status is at baseline.      Motor: Weakness (very mild chronic left weakness, at baseline) present.   Psychiatric:         Mood and Affect: Mood normal.         Behavior: Behavior normal.         Results Review     I reviewed the patient's new clinical results.  Results from last 7 days   Lab Units 11/08/21  0427 11/07/21  2151 11/07/21  1351 11/07/21  0538 11/06/21  1805 11/06/21  0550 11/05/21  1758 11/05/21  1000   WBC 10*3/mm3 7.23  --   --  10.92*  --  12.45*  --  16.08*   HEMOGLOBIN g/dL 8.4* 8.2* 8.5* 8.5*   < > 6.9*   < > 7.6*  7.6*   PLATELETS 10*3/mm3 195  --   --  182  --  145  --  265    < > = values in this interval not displayed.     Results from last 7 days   Lab Units 11/08/21 0427 11/07/21  0538 11/06/21  0550 11/05/21  1000   SODIUM mmol/L 142 144 146* 144   POTASSIUM mmol/L 4.1 4.4 4.4 5.0   CHLORIDE mmol/L 112* 113* 113* 111*   CO2 mmol/L 22.0 20.6* 21.9* 20.5*   BUN mg/dL 37* 44* 49* 49*   CREATININE mg/dL 1.36* 1.40* 1.87* 2.06*   GLUCOSE mg/dL 116* 124* 141* 184*   EGFR IF NONAFRICN AM mL/min/1.73 50* 48* 35* 31*     Results from last 7 days   Lab Units 11/08/21  0427 11/07/21  0538 11/06/21  0550 11/05/21  1000 11/04/21  0608 11/04/21  0608 11/03/21  0447 11/03/21  0447 11/02/21  0324 11/02/21  0324   ALBUMIN g/dL 2.50* 2.60* 2.50* 2.60*   < > 2.60*   < > 3.00*   < > 3.30*   BILIRUBIN mg/dL  --   --   --  0.7  --  1.0  --  0.4  --  0.3   ALK PHOS U/L  --   --   --  79  --  76  --  89  --  92   AST (SGOT) U/L  --   --   --  31  --  19  --  18  --  23   ALT (SGPT) U/L  --   --   --  18  --  16  --  17  --  21    < > = values in this interval not displayed.     Results from last 7 days   Lab Units 11/08/21  0427 11/07/21  0538 11/06/21  0550 11/05/21  1000 11/04/21  0608 11/04/21  0608 11/03/21  0447  11/03/21  0447   CALCIUM mg/dL 7.7* 7.9* 7.9* 8.0*   < > 8.0*   < > 8.2*   ALBUMIN g/dL 2.50* 2.60* 2.50* 2.60*   < > 2.60*   < > 3.00*   MAGNESIUM mg/dL  --   --   --  2.6*  --  2.3  --  2.4   PHOSPHORUS mg/dL 2.9 3.0 3.1  --   --   --   --   --     < > = values in this interval not displayed.     Results from last 7 days   Lab Units 11/08/21  0427 11/07/21  0537 11/06/21  0550 11/05/21  1000 11/04/21  0608 11/03/21  0447 11/02/21  0324   PROTIME Seconds 21.5* 22.3* 22.6* 24.9* 26.3* 25.0* 23.7*   INR  1.89* 1.98* 2.02* 2.28* 2.45* 2.29* 2.14*     Glucose   Date/Time Value Ref Range Status   11/08/2021 0559 129 70 - 130 mg/dL Final     Comment:     Meter: [id number] : [id number] Traore Jessica NA   11/07/2021 2014 172 (H) 70 - 130 mg/dL Final     Comment:     Meter: [id number] : [id number] Traore Jessica NA   11/07/2021 1644 125 70 - 130 mg/dL Final     Comment:     Meter: [id number] : [id number] Sequeira Alice NA   11/07/2021 1102 140 (H) 70 - 130 mg/dL Final     Comment:     Meter: [id number] : [id number] Sequeira Alice NA   11/07/2021 0615 128 70 - 130 mg/dL Final     Comment:     Meter: [id number] : [id number] Cutler Malini NA   11/06/2021 2044 168 (H) 70 - 130 mg/dL Final     Comment:     Meter: [id number] : [id number] Cutler Malini NA   11/06/2021 1654 114 70 - 130 mg/dL Final     Comment:     Meter: [id number] : [id number] Michele ALEJO       CT Abdomen Pelvis Without Contrast  11/4  CONCLUSION:  1. Right-sided pleural effusion appears slightly larger compared to the  previous examination, new small left-sided pleural effusion has  developed. There is diffuse body wall edema and cardiac enlargement.  Small amount of free fluid is demonstrated within the peritoneal cavity.  2. Sizable bilobed collection has developed within the right rectus  abdominis muscle, consistent with hematoma. See above dimensions.  3. Diverticulosis of the colon, subtle pericolonic induration about the  mid  "sigmoid suggests possible diverticulitis.  4. Urine demonstrates increased density, perhaps related to residual  contrast from previous CT or hemorrhage. Correlation with urinalysis  advised.  5. Gallstones.        Scheduled Medications  atorvastatin, 40 mg, Oral, Daily  dextrose, 25 g, Intravenous, Once  digoxin, 125 mcg, Oral, Daily  ferrous sulfate, 325 mg, Oral, Daily With Breakfast  guaiFENesin, 600 mg, Oral, Q12H  insulin lispro, 0-9 Units, Subcutaneous, 4x Daily With Meals & Nightly  magnesium oxide, 400 mg, Oral, BID  metoprolol succinate XL, 25 mg, Oral, Daily  pantoprazole, 40 mg, Intravenous, Q12H  sodium bicarbonate, 1,950 mg, Oral, BID    Infusions   Diet  Diet Clear Liquid; Thin       Assessment/Plan     Active Hospital Problems    Diagnosis  POA   • **Lower GI bleed [K92.2]  Yes   • Rectus sheath hematoma [S30.1XXA]  No   • Acute posthemorrhagic anemia [D62]  Yes   • History of nephrectomy [Z90.5]  Not Applicable   • Abnormal urinalysis [R82.90]  Yes   • Recurrent right pleural effusion [J90]  Yes   • 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       84 yo gentleman with h/o DM2, AFib, Mech AVR (on warfarin), HTN, CKD3, h/o RCC s/p right nephrectomy and h/o CVA with mild residual left hemiparesis, admitted here from 10/23 to 10/30 for \"obscure\" GI bleed and bilateral pleural effusions (transudate), who presented to ER with c/o rectal bleeding and generalized weakness. Found to " have drop in Hgb from 9.3 to 7.2. Stool grossly bloody in ER.     Hemoglobin remains relatively stable.  INR very slowly trending down.  Renal function stable.  Discussed with RN and GI.  Plan for bowel prep today with colonoscopy tomorrow.    Pulmonology following, may need thoracentesis.  ST recommends VFSS when GI issues resolved.  BS relatively stable on SSI.      SCDs for DVT prophylaxis.  Full code.  Discussed with patient, family, nursing staff and consulting provider.  Anticipate discharge home with HH vs SNU facility timing yet to be determined.      Santos Guzman MD  [facility]  11/08/21  10:59 EST

## 2021-11-08 NOTE — PROGRESS NOTES
"CC: Persistent atrial fibrillation/mechanical aortic valve    Interval History: No new acute events overnight      Vital Signs  Temp:  [97.6 °F (36.4 °C)-97.9 °F (36.6 °C)] 97.6 °F (36.4 °C)  Heart Rate:  [75-80] 80  Resp:  [16-20] 17  BP: (118-128)/(54-72) 128/64    Intake/Output Summary (Last 24 hours) at 11/8/2021 0939  Last data filed at 11/8/2021 0652  Gross per 24 hour   Intake 1240 ml   Output 1000 ml   Net 240 ml     Flowsheet Rows      First Filed Value   Admission Height 182.9 cm (72\") Documented at 11/02/2021 0344   Admission Weight 81.6 kg (180 lb) Documented at 11/02/2021 0344          PHYSICAL EXAM:  General: No acute distress  Resp:NL Rate, symmetric chest expansion,unlabored, clear  CV:NL rate and rhythm, NL PMI, NL S1 and S2, no Murmur, mechanical aortic valve click heard  ABD:Nl sounds, no masses or tenderness, nondistended, no guarding or rebound  Neuro: alert,cooperative and oriented  Extr:Normal pedal pulses, No edema or cyanosis, moves all extremities      Results Review:    Results from last 7 days   Lab Units 11/08/21  0427   SODIUM mmol/L 142   POTASSIUM mmol/L 4.1   CHLORIDE mmol/L 112*   CO2 mmol/L 22.0   BUN mg/dL 37*   CREATININE mg/dL 1.36*   GLUCOSE mg/dL 116*   CALCIUM mg/dL 7.7*         Results from last 7 days   Lab Units 11/08/21  0427   WBC 10*3/mm3 7.23   HEMOGLOBIN g/dL 8.4*   HEMATOCRIT % 26.3*   PLATELETS 10*3/mm3 195     Results from last 7 days   Lab Units 11/08/21  0427 11/07/21  0537 11/06/21  0550 11/03/21  0447 11/02/21  0324   INR  1.89* 1.98* 2.02*   < > 2.14*   APTT seconds  --   --   --   --  70.3*    < > = values in this interval not displayed.         Results from last 7 days   Lab Units 11/05/21  1000   MAGNESIUM mg/dL 2.6*         I reviewed the patient's new clinical results.  I personally viewed and interpreted the patient's EKG/Telemetry data        Medication Review:   Meds reviewed         Assessment/Plan    1.  Acute blood loss anemia from GI bleed status " post transfusion of 6 units of packed RBC  -RBC scan suggesting area of bleed distal ileum/proximal colon  2.   Persistent atrial fibrillation with rate fairly controlled  with digoxin metoprolol  3.   Mechanical aortic valve that is functioning normally  4.  History of CVA.  Stroke occurred on a therapeutic INR.    5.  Recurrent right pleural effusion.  Status post thoracentesis on 10/25 with 1650 cc of transudative fluid removed.  6.  Rectal sheath hematoma.  No indication for surgical intervention.  7. Acute on chronic renal failure   8. Severe pulmonary hypertension per echo in 10/2021  - not in overt heart failure   9. A 5.65 second pause noted on telemetry last night- asymptomatic and no recurrence- continue monitoring         Coagulation with Coumadin on hold because of GI bleed.  INR today is 1.89  Hemoglobin today 8.4-stable  Hemodynamically stable  Pending repeat colonoscopy  Unfortunately anticoagulation on hold because of hemodynamically significant recurrent GI bleeding-resume once cleared by GI.    Meliton Trejo MD  11/08/21  09:39 EST

## 2021-11-08 NOTE — PLAN OF CARE
Goal Outcome Evaluation:  Plan of Care Reviewed With: patient, spouse        Progress: improving  Outcome Summary: VSS; hgb stable; frequent BMs, scrotum bruised and swollen, Dr. Guzman is aware, no new orderes; will start bowel prep this evening for possible colonoscopy tomorrow, consent is signed; continue to monitor.

## 2021-11-08 NOTE — PLAN OF CARE
Goal Outcome Evaluation:  Plan of Care Reviewed With: patient        Progress: no change  Outcome Summary: VSS. Had several BMs tonight which were dark brown and green in color. Tolerating clear diet well. No complaints of pain. Will continue to monitor.

## 2021-11-08 NOTE — PROGRESS NOTES
Lebron Cano MD                          [phone]      Patient ID:    Name:  Francisco Sequeira    MRN:  [id number]    1935   85 y.o.  male            Patient Care Team:  Rubin Hinkle APRN as PCP - General (Family Medicine)  Audra Vazquez RODDY as Pharmacist  Vasquez Niño PharmD as Pharmacist (Pharmacy)    CC/ Reason for visit: Pleural effusions, GI bleed, heart failure, pulmonary pretension    Subjective: Pt seen and examined this AM. No acute overnight events noted. Doing better.  Not requiring supplemental oxygen.    ROS: Denies any subjective fevers, syncope or presyncopal events, new neurological deficits, nausea or vomiting currently    Objective     Vital Signs past 24hrs    BP range: BP: (121-128)/(59-72) 122/59  Pulse range: Heart Rate:  [72-80] 72  Resp rate range: Resp:  [16-18] 16  Temp range: Temp (24hrs), Av.8 °F (36.6 °C), Min:97.6 °F (36.4 °C), Max:97.9 °F (36.6 °C)      Ventilator/Non-Invasive Ventilation Settings (From admission, onward)            None          Device (Oxygen Therapy): room air       81.6 kg (180 lb); Body mass index is 24.41 kg/m².      Intake/Output Summary (Last 24 hours) at 2021 1735  Last data filed at 2021 1713  Gross per 24 hour   Intake 1000 ml   Output 900 ml   Net 100 ml       PHYSICAL EXAM   Constitutional: Middle aged pt in bed, No acute respiratory distress, no accessory muscle use  Head: - NCAT  Eyes: No pallor.  Anicteric sclerae, EOMI.  ENMT:  Mallampati 3, no oral thrush. Moist MM.   NECK: Trachea midline, No thyromegaly, no palpable cervical lymphadenopathy  Heart: RRR, no murmur. No pedal edema   Lungs: DOMITILA +, decreased breath sounds at the bases, no wheezes/ crackles heard    Abdomen: Soft. No tenderness, guarding or rigidity. No palpable masses  Extremities: Extremities warm and well perfused. No cyanosis/ clubbing  Neuro: Conscious, answers appropriately, no gross focal neuro deficits  Psych: Mood  and affect appropriate    PPE recommended per [facility] infectious disease Isolation protocol for the current clinical scenario(as mentioned below) was followed.     Scheduled meds:  atorvastatin, 40 mg, Oral, Daily  dextrose, 25 g, Intravenous, Once  digoxin, 125 mcg, Oral, Daily  ferrous sulfate, 325 mg, Oral, Daily With Breakfast  guaiFENesin, 600 mg, Oral, Q12H  insulin lispro, 0-9 Units, Subcutaneous, 4x Daily With Meals & Nightly  magnesium oxide, 400 mg, Oral, BID  metoprolol succinate XL, 25 mg, Oral, Daily  pantoprazole, 40 mg, Intravenous, Q12H  polyethylene glycol, 1 bottle, Oral, Once  sodium bicarbonate, 1,950 mg, Oral, BID        IV meds:                           Data Review:      Results from last 7 days   Lab Units 11/08/21  0427 11/07/21  2151 11/07/21  1351 11/07/21  0538 11/07/21  0538 11/07/21  0537 11/06/21  2246 11/06/21  1805 11/06/21  0550 11/05/21  1758 11/05/21  1000 11/04/21  1156 11/04/21  0608 11/03/21  1211 11/03/21  0447 11/02/21  0455 11/02/21  0324   SODIUM mmol/L 142  --   --   --  144  --   --   --  146*  --  144  --  141  --  139   < > 144   POTASSIUM mmol/L 4.1  --   --   --  4.4  --   --   --  4.4  --  5.0  --  5.0  --  5.0   < > 4.7   CHLORIDE mmol/L 112*  --   --   --  113*  --   --   --  113*  --  111*  --  109*  --  107   < > 111*   CO2 mmol/L 22.0  --   --   --  20.6*  --   --   --  21.9*  --  20.5*  --  24.3  --  23.2   < > 23.8   BUN mg/dL 37*  --   --   --  44*  --   --   --  49*  --  49*  --  40*  --  35*   < > 40*   CREATININE mg/dL 1.36*  --   --   --  1.40*  --   --   --  1.87*  --  2.06*  --  1.89*  --  1.55*   < > 1.53*   CALCIUM mg/dL 7.7*  --   --   --  7.9*  --   --   --  7.9*  --  8.0*  --  8.0*  --  8.2*   < > 8.2*   BILIRUBIN mg/dL  --   --   --   --   --   --   --   --   --   --  0.7  --  1.0  --  0.4   < > 0.3   ALK PHOS U/L  --   --   --   --   --   --   --   --   --   --  79  --  76  --  89   < > 92   ALT (SGPT) U/L  --   --   --   --   --   --    --   --   --   --  18  --  16  --  17   < > 21   AST (SGOT) U/L  --   --   --   --   --   --   --   --   --   --  31  --  19  --  18   < > 23   GLUCOSE mg/dL 116*  --   --   --  124*  --   --   --  141*  --  184*  --  151*  --  261*   < > 44*   WBC 10*3/mm3 7.23  --   --   --  10.92*  --   --   --  12.45*  --  16.08*  --  14.55*  --  7.47   < > 5.78   HEMOGLOBIN g/dL 8.4* 8.2* 8.5*   < > 8.5*  --    < >   < > 6.9*   < > 7.6*  7.6*   < > 8.0*   < > 7.9*  7.9*   < > 7.2*   PLATELETS 10*3/mm3 195  --   --   --  182  --   --   --  145  --  265  --  130*  --  151   < > 151   INR  1.89*  --   --   --   --  1.98*  --   --  2.02*  --  2.28*  --  2.45*  --  2.29*   < > 2.14*   PROBNP pg/mL  --   --   --   --   --   --   --   --   --   --   --   --   --   --   --   --  766.6   PROCALCITONIN ng/mL  --   --   --   --   --   --   --   --   --   --   --   --   --   --   --   --  0.12    < > = values in this interval not displayed.       Lab Results   Component Value Date    CALCIUM 7.7 (L) 11/08/2021    PHOS 2.9 11/08/2021                    Results Review:    I have reviewed the relevant laboratory results and independently reviewed the chest imaging from this hospitalization including the available echocardiogram reports personally and summarized it if/ when appropriate below    Assessment    Bilateral pleural effusions  Acute GI bleed; lower  Previous mechanical aortic valve replacement  Pulmonary pretension  A. fib  Anemia    PLAN:  Patient's work-up done so far recommendations from prior pulmonologist noted.  Imaging reviewed personally.  Patient with improvement with regards to volume status after resuscitation for GI bleed and ongoing work-up  Noted cardiology and GI input.  Renal function stable  We will repeat chest x-ray and reevaluate pleural effusions and consider thoracentesis if needed.  Not requiring supplemental oxygen.    Discussed with patient and wife at bedside    Lebron Cano MD  11/8/2021

## 2021-11-08 NOTE — PROGRESS NOTES
[facility] Consult Note          PATIENT IDENTIFICATION:   Name:  Francisco Sequeira      MRN:  [id number]     85 y.o.  male               SUBJECTIVE:   Patient is feeling better today denies any nausea no vomiting.  No fever no chills able to drink water with no complaints.  On clear liquid diet.  No urinary symptoms.  Denies any shortness of breath no chest pain    OBJECTIVE:  Vitals:    11/07/21 1910 11/07/21 2300 11/08/21 0716 11/08/21 0855   BP: 124/72 121/66 128/64    BP Location: Right arm Right arm Right arm    Patient Position: Lying Lying Sitting    Pulse: 78 77 75 80   Resp: 18 16 17    Temp: 97.8 °F (36.6 °C) 97.9 °F (36.6 °C) 97.6 °F (36.4 °C)    TempSrc: Oral Oral Oral    SpO2: 95% 98% 98%    Weight:       Height:               Body mass index is 24.41 kg/m².    Intake/Output Summary (Last 24 hours) at 11/8/2021 0902  Last data filed at 11/8/2021 0652  Gross per 24 hour   Intake 1240 ml   Output 1000 ml   Net 240 ml     Wt Readings from Last 1 Encounters:   11/02/21 0344 81.6 kg (180 lb)     Wt Readings from Last 3 Encounters:   11/02/21 81.6 kg (180 lb)   10/24/21 85.3 kg (188 lb)   07/30/21 86.2 kg (190 lb)       Physical Exam:  NAD; pleasant; partially oriented   Pale; looks stated age  MMM; AT/NC   No eye discharge; no scleral icterus  No JVD; no carotid bruits  Decreased breath sounds in bases bilat; not labored on RA  Irregularly irregular, metal click, 2/6M  Firm, +T but no G or R, +D, BS+  Trace LE edema  No clubbing  No asterixis  Moves all extremities   Mood and affect are normal    Scheduled meds:    atorvastatin, 40 mg, Oral, Daily  dextrose, 25 g, Intravenous, Once  digoxin, 125 mcg, Oral, Daily  ferrous sulfate, 325 mg, Oral, Daily With Breakfast  guaiFENesin, 600 mg, Oral, Q12H  insulin lispro, 0-9 Units, Subcutaneous, 4x Daily With Meals & Nightly  magnesium oxide, 400 mg, Oral, BID  metoprolol succinate XL, 25 mg, Oral, Daily  pantoprazole, 40 mg, Intravenous,  Q12H  sodium bicarbonate, 1,950 mg, Oral, BID      IV meds:                             Data Review:    Results from last 7 days   Lab Units 11/08/21 0427 11/07/21 0538 11/06/21  0550 11/05/21  1000 11/05/21  1000 11/04/21  0608 11/04/21 0608 11/03/21 0447 11/03/21 0447   SODIUM mmol/L 142 144 146*   < > 144   < > 141   < > 139   POTASSIUM mmol/L 4.1 4.4 4.4   < > 5.0   < > 5.0   < > 5.0   CHLORIDE mmol/L 112* 113* 113*   < > 111*   < > 109*   < > 107   CO2 mmol/L 22.0 20.6* 21.9*   < > 20.5*   < > 24.3   < > 23.2   BUN mg/dL 37* 44* 49*   < > 49*   < > 40*   < > 35*   CREATININE mg/dL 1.36* 1.40* 1.87*   < > 2.06*   < > 1.89*   < > 1.55*   CALCIUM mg/dL 7.7* 7.9* 7.9*   < > 8.0*   < > 8.0*   < > 8.2*   BILIRUBIN mg/dL  --   --   --   --  0.7  --  1.0  --  0.4   ALK PHOS U/L  --   --   --   --  79  --  76  --  89   ALT (SGPT) U/L  --   --   --   --  18  --  16  --  17   AST (SGOT) U/L  --   --   --   --  31  --  19  --  18   GLUCOSE mg/dL 116* 124* 141*   < > 184*   < > 151*   < > 261*    < > = values in this interval not displayed.     Estimated Creatinine Clearance: 47.5 mL/min (A) (by C-G formula based on SCr of 1.36 mg/dL (H)).  Results from last 7 days   Lab Units 11/05/21  1808   SODIUM UR mmol/L 26   CREATININE UR mg/dL 91.0     Results from last 7 days   Lab Units 11/08/21 0427 11/07/21 0538 11/06/21 0550 11/05/21  1000 11/04/21  0608 11/03/21  0447   MAGNESIUM mg/dL  --   --   --  2.6* 2.3 2.4   PHOSPHORUS mg/dL 2.9 3.0 3.1  --   --   --        Results from last 7 days   Lab Units 11/08/21  0427 11/07/21  2151 11/07/21  1351 11/07/21  0538 11/06/21  2246 11/06/21  1805 11/06/21  0550 11/05/21  1758 11/05/21  1000 11/04/21  1156 11/04/21  0608   WBC 10*3/mm3 7.23  --   --  10.92*  --   --  12.45*  --  16.08*  --  14.55*   HEMOGLOBIN g/dL 8.4* 8.2* 8.5* 8.5* 8.4*   < > 6.9*   < > 7.6*  7.6*   < > 8.0*   PLATELETS 10*3/mm3 195  --   --  182  --   --  145  --  265  --  130*    < > = values in this  interval not displayed.       Results from last 7 days   Lab Units 11/08/21  0427 11/07/21  0537 11/06/21  0550 11/05/21  1000 11/04/21  0608   INR  1.89* 1.98* 2.02* 2.28* 2.45*             ASSESSMENT:     Lower GI bleed    Atrial fibrillation (HCC)    Hypertension    Uncontrolled type 2 diabetes mellitus with complication, with long-term current use of insulin (HCC)    Hx of aortic valve replacement, mechanical [Z95.2]    Stage 3b chronic kidney disease (HCC)    Chronic anticoagulation    History of nephrectomy    Abnormal urinalysis    Recurrent right pleural effusion    Hemiparesis of left nondominant side as late effect of cerebral infarction (HCC)    Rectus sheath hematoma    Acute posthemorrhagic anemia    1. BYRON on CKD3, non-oliguric, better and back to baseline:  prerenal due to hypovolemia, blood loss, hypotension, and recent contrast exposure on 11/2/21.  Bladder scans are normal.    2. Acute GI bleed with bloody stools noted this admission and tagged RBC scan showing bleed in the RLQ consistent with distal ileum or proximal colon.  Awaiting colonoscopy that is planned while INR is subtherapeutic  3. Acute blood loss anemia related to GI bleed. He has received 6 units PRBCs this admission.  Hemoglobin has been stable  4. History of mechanical AVR and Atrial Fibrillation anticoagulated with warfarin. His warfarin is being held and IV heparin drip infusing  5. Hypertension, controlled  6. Recurrent right pleural effusion; transudate per last thoracentesis. Repeat thoracentesis planned when more stable. Home lasix of 40 mg daily is being held.   7. Type II diabetes. Hgb A1c 5.2 last admission.  8. History of CVA with left side hemiparesis.  9. Mild hypophosphatemia.  Likely secondary to decreased oral intake.  No indication for replacement this time.        PLAN:  1. Continue current therapy.  2. Avoid nephrotoxic agents  3. Surveillance labs    Laz Fernandes MD  11/8/2021  09:02 EST

## 2021-11-09 ENCOUNTER — ANESTHESIA EVENT (OUTPATIENT)
Dept: GASTROENTEROLOGY | Facility: HOSPITAL | Age: 86
End: 2021-11-09

## 2021-11-09 ENCOUNTER — ANESTHESIA (OUTPATIENT)
Dept: GASTROENTEROLOGY | Facility: HOSPITAL | Age: 86
End: 2021-11-09

## 2021-11-09 LAB
ALBUMIN SERPL-MCNC: 2.6 G/DL (ref 3.5–5.2)
ANION GAP SERPL CALCULATED.3IONS-SCNC: 10.5 MMOL/L (ref 5–15)
BUN SERPL-MCNC: 25 MG/DL (ref 8–23)
BUN/CREAT SERPL: 18.2 (ref 7–25)
CALCIUM SPEC-SCNC: 7.9 MG/DL (ref 8.6–10.5)
CHLORIDE SERPL-SCNC: 113 MMOL/L (ref 98–107)
CO2 SERPL-SCNC: 21.5 MMOL/L (ref 22–29)
CREAT SERPL-MCNC: 1.37 MG/DL (ref 0.76–1.27)
DEPRECATED RDW RBC AUTO: 61 FL (ref 37–54)
ERYTHROCYTE [DISTWIDTH] IN BLOOD BY AUTOMATED COUNT: 20.2 % (ref 12.3–15.4)
GFR SERPL CREATININE-BSD FRML MDRD: 50 ML/MIN/1.73
GLUCOSE BLDC GLUCOMTR-MCNC: 117 MG/DL (ref 70–130)
GLUCOSE BLDC GLUCOMTR-MCNC: 125 MG/DL (ref 70–130)
GLUCOSE BLDC GLUCOMTR-MCNC: 132 MG/DL (ref 70–130)
GLUCOSE BLDC GLUCOMTR-MCNC: 134 MG/DL (ref 70–130)
GLUCOSE SERPL-MCNC: 134 MG/DL (ref 65–99)
HCT VFR BLD AUTO: 26.3 % (ref 37.5–51)
HGB BLD-MCNC: 8.4 G/DL (ref 13–17.7)
INR PPP: 2 (ref 0.9–1.1)
MCH RBC QN AUTO: 26.2 PG (ref 26.6–33)
MCHC RBC AUTO-ENTMCNC: 31.9 G/DL (ref 31.5–35.7)
MCV RBC AUTO: 81.9 FL (ref 79–97)
PHOSPHATE SERPL-MCNC: 2.9 MG/DL (ref 2.5–4.5)
PLATELET # BLD AUTO: 219 10*3/MM3 (ref 140–450)
PMV BLD AUTO: 11.2 FL (ref 6–12)
POTASSIUM SERPL-SCNC: 4.2 MMOL/L (ref 3.5–5.2)
PROTHROMBIN TIME: 22.5 SECONDS (ref 11.7–14.2)
RBC # BLD AUTO: 3.21 10*6/MM3 (ref 4.14–5.8)
SODIUM SERPL-SCNC: 145 MMOL/L (ref 136–145)
WBC # BLD AUTO: 7.19 10*3/MM3 (ref 3.4–10.8)

## 2021-11-09 PROCEDURE — 36415 COLL VENOUS BLD VENIPUNCTURE: CPT | Performed by: HOSPITALIST

## 2021-11-09 PROCEDURE — 45388 COLONOSCOPY W/ABLATION: CPT | Performed by: INTERNAL MEDICINE

## 2021-11-09 PROCEDURE — 25010000002 PHENYLEPHRINE 10 MG/ML SOLUTION: Performed by: ANESTHESIOLOGY

## 2021-11-09 PROCEDURE — 82962 GLUCOSE BLOOD TEST: CPT

## 2021-11-09 PROCEDURE — 80069 RENAL FUNCTION PANEL: CPT | Performed by: HOSPITALIST

## 2021-11-09 PROCEDURE — 25010000002 PROPOFOL 10 MG/ML EMULSION: Performed by: ANESTHESIOLOGY

## 2021-11-09 PROCEDURE — 99232 SBSQ HOSP IP/OBS MODERATE 35: CPT | Performed by: NURSE PRACTITIONER

## 2021-11-09 PROCEDURE — 85610 PROTHROMBIN TIME: CPT | Performed by: HOSPITALIST

## 2021-11-09 PROCEDURE — 0W3P4ZZ CONTROL BLEEDING IN GASTROINTESTINAL TRACT, PERCUTANEOUS ENDOSCOPIC APPROACH: ICD-10-PCS | Performed by: INTERNAL MEDICINE

## 2021-11-09 PROCEDURE — 85027 COMPLETE CBC AUTOMATED: CPT | Performed by: HOSPITALIST

## 2021-11-09 DEVICE — DEV CLIP ENDO RESOLUTION360 CONTRL ROT 235CM: Type: IMPLANTABLE DEVICE | Site: ASCENDING COLON | Status: FUNCTIONAL

## 2021-11-09 RX ORDER — PROPOFOL 10 MG/ML
VIAL (ML) INTRAVENOUS CONTINUOUS PRN
Status: DISCONTINUED | OUTPATIENT
Start: 2021-11-09 | End: 2021-11-09 | Stop reason: SURG

## 2021-11-09 RX ORDER — LIDOCAINE HYDROCHLORIDE 20 MG/ML
INJECTION, SOLUTION INFILTRATION; PERINEURAL AS NEEDED
Status: DISCONTINUED | OUTPATIENT
Start: 2021-11-09 | End: 2021-11-09 | Stop reason: SURG

## 2021-11-09 RX ORDER — SODIUM CHLORIDE 450 MG/100ML
75 INJECTION, SOLUTION INTRAVENOUS CONTINUOUS
Status: DISCONTINUED | OUTPATIENT
Start: 2021-11-09 | End: 2021-11-10

## 2021-11-09 RX ORDER — PROPOFOL 10 MG/ML
VIAL (ML) INTRAVENOUS AS NEEDED
Status: DISCONTINUED | OUTPATIENT
Start: 2021-11-09 | End: 2021-11-09 | Stop reason: SURG

## 2021-11-09 RX ORDER — PHENYLEPHRINE HYDROCHLORIDE 10 MG/ML
INJECTION INTRAVENOUS AS NEEDED
Status: DISCONTINUED | OUTPATIENT
Start: 2021-11-09 | End: 2021-11-09 | Stop reason: SURG

## 2021-11-09 RX ORDER — SODIUM CHLORIDE 9 MG/ML
30 INJECTION, SOLUTION INTRAVENOUS CONTINUOUS PRN
Status: DISCONTINUED | OUTPATIENT
Start: 2021-11-09 | End: 2021-11-16 | Stop reason: HOSPADM

## 2021-11-09 RX ADMIN — FERROUS SULFATE TAB 325 MG (65 MG ELEMENTAL FE) 325 MG: 325 (65 FE) TAB at 09:03

## 2021-11-09 RX ADMIN — DIGOXIN 125 MCG: 250 TABLET ORAL at 09:04

## 2021-11-09 RX ADMIN — PANTOPRAZOLE SODIUM 40 MG: 40 INJECTION, POWDER, FOR SOLUTION INTRAVENOUS at 09:03

## 2021-11-09 RX ADMIN — MAGNESIUM OXIDE 400 MG (241.3 MG MAGNESIUM) TABLET 400 MG: TABLET at 21:21

## 2021-11-09 RX ADMIN — PHENYLEPHRINE HYDROCHLORIDE 200 MCG: 10 INJECTION INTRAVENOUS at 17:54

## 2021-11-09 RX ADMIN — PHENYLEPHRINE HYDROCHLORIDE 100 MCG: 10 INJECTION INTRAVENOUS at 17:47

## 2021-11-09 RX ADMIN — Medication 200 MCG/KG/MIN: at 17:31

## 2021-11-09 RX ADMIN — GUAIFENESIN 600 MG: 600 TABLET, EXTENDED RELEASE ORAL at 21:21

## 2021-11-09 RX ADMIN — SODIUM BICARBONATE 1950 MG: 650 TABLET ORAL at 09:04

## 2021-11-09 RX ADMIN — SODIUM BICARBONATE 1950 MG: 650 TABLET ORAL at 21:21

## 2021-11-09 RX ADMIN — MAGNESIUM OXIDE 400 MG (241.3 MG MAGNESIUM) TABLET 400 MG: TABLET at 09:04

## 2021-11-09 RX ADMIN — SODIUM CHLORIDE 75 ML/HR: 4.5 INJECTION, SOLUTION INTRAVENOUS at 12:55

## 2021-11-09 RX ADMIN — PANTOPRAZOLE SODIUM 40 MG: 40 INJECTION, POWDER, FOR SOLUTION INTRAVENOUS at 21:21

## 2021-11-09 RX ADMIN — ATORVASTATIN CALCIUM 40 MG: 20 TABLET, FILM COATED ORAL at 09:03

## 2021-11-09 RX ADMIN — SODIUM CHLORIDE: 9 INJECTION, SOLUTION INTRAVENOUS at 17:27

## 2021-11-09 RX ADMIN — METOPROLOL SUCCINATE 25 MG: 25 TABLET, EXTENDED RELEASE ORAL at 09:04

## 2021-11-09 RX ADMIN — PROPOFOL 120 MG: 10 INJECTION, EMULSION INTRAVENOUS at 17:31

## 2021-11-09 RX ADMIN — PHENYLEPHRINE HYDROCHLORIDE 100 MCG: 10 INJECTION INTRAVENOUS at 17:36

## 2021-11-09 RX ADMIN — LIDOCAINE HYDROCHLORIDE 60 MG: 20 INJECTION, SOLUTION INFILTRATION; PERINEURAL at 17:31

## 2021-11-09 NOTE — ANESTHESIA PREPROCEDURE EVALUATION
Anesthesia Evaluation     Patient summary reviewed and Nursing notes reviewed                Airway   Mallampati: II  TM distance: >3 FB  Neck ROM: full  No difficulty expected  Dental - normal exam     Pulmonary - normal exam   (+) pleural effusion, a smoker Former,     ROS comment: Recurrent right pleural effusion  Cardiovascular     ECG reviewed  Rhythm: irregular  Rate: normal    (+) hypertension 2 medications or greater, valvular problems/murmurs AS, dysrhythmias Atrial Fib, hyperlipidemia,     ROS comment: Hx of AS due to congenital bicuspid valve, s/p prosthetic  AVR and ascending aortic aneurysm repair long ago/hx of NICM/EF 60%/mod TR/severe pulmonary HTN by ECHO 10/21  PE comment: Afib/prosthetic aortic valve sounds    Neuro/Psych  (+) CVA residual symptoms, syncope,       ROS Comment: Hx CVA with residual left-sided weakness  GI/Hepatic/Renal/Endo    (+)  GERD, GI bleeding , renal disease CRI, diabetes mellitus type 2 poorly controlled using insulin,     ROS Comment: Hx nephrectomy    Musculoskeletal     Abdominal    Substance History      OB/GYN          Other      history of cancer      Other Comment: Kidney CA                  Anesthesia Plan    ASA 4     MAC     intravenous induction     Anesthetic plan, all risks, benefits, and alternatives have been provided, discussed and informed consent has been obtained with: patient.    Plan discussed with CRNA.

## 2021-11-09 NOTE — PROGRESS NOTES
Name: Francisco Sequeira ADMIT: 2021   : 1935  PCP: Rubin Hinkle APRN    MRN: [id number] LOS: 7 days   AGE/SEX: 85 y.o. male  ROOM: [facility]     Subjective   Subjective    No new issues.  Less pain RLQ.  Still having black stools, rather frequently.  Tolerated bowel prep.    Review of Systems   Constitutional: Positive for fatigue.   Gastrointestinal: Positive for abdominal pain, blood in stool and diarrhea.        Objective   Objective   Vital Signs  Temp:  [97.4 °F (36.3 °C)-98.1 °F (36.7 °C)] 97.4 °F (36.3 °C)  Heart Rate:  [72-91] 88  Resp:  [16] 16  BP: (121-141)/(59-78) 131/68  SpO2:  [90 %-97 %] 97 %  on   ;   Device (Oxygen Therapy): room air  Body mass index is 24.41 kg/m².  Physical Exam  Vitals and nursing note reviewed.   Constitutional:       General: He is not in acute distress.     Appearance: He is ill-appearing. He is not toxic-appearing or diaphoretic.   HENT:      Head: Normocephalic and atraumatic.   Eyes:      General: No scleral icterus.     Extraocular Movements: Extraocular movements intact.      Conjunctiva/sclera: Conjunctivae normal.   Cardiovascular:      Rate and Rhythm: Normal rate. Rhythm irregularly irregular.      Pulses: Normal pulses.      Comments: Mechanical s2  Pulmonary:      Effort: Pulmonary effort is normal. No respiratory distress.      Breath sounds: Normal breath sounds. No wheezing or rales.      Comments: Decreased BS right base  Abdominal:      General: Bowel sounds are normal. There is no distension.      Palpations: Abdomen is soft.      Tenderness: There is abdominal tenderness (RLQ). There is guarding (voluntary). There is no rebound.   Musculoskeletal:         General: No swelling or deformity. Normal range of motion.      Cervical back: Neck supple. No rigidity.   Lymphadenopathy:      Cervical: No cervical adenopathy.   Skin:     General: Skin is warm and dry.      Capillary Refill: Capillary refill takes less than 2 seconds.      Coloration:  Skin is pale.      Findings: Bruising (Right abdomen wall tracking superiorly down to groin and back) present.   Neurological:      Mental Status: He is alert and oriented to person, place, and time. Mental status is at baseline.      Motor: Weakness (very mild chronic left weakness, at baseline) present.   Psychiatric:         Mood and Affect: Mood normal.         Behavior: Behavior normal.       Results Review     I reviewed the patient's new clinical results.  Results from last 7 days   Lab Units 11/09/21  0603 11/08/21  1737 11/08/21  0427 11/07/21  2151 11/07/21  1351 11/07/21  0538 11/06/21  1805 11/06/21  0550   WBC 10*3/mm3 7.19  --  7.23  --   --  10.92*  --  12.45*   HEMOGLOBIN g/dL 8.4* 9.5* 8.4* 8.2*   < > 8.5*   < > 6.9*   PLATELETS 10*3/mm3 219  --  195  --   --  182  --  145    < > = values in this interval not displayed.     Results from last 7 days   Lab Units 11/08/21  0427 11/07/21  0538 11/06/21  0550 11/05/21  1000   SODIUM mmol/L 142 144 146* 144   POTASSIUM mmol/L 4.1 4.4 4.4 5.0   CHLORIDE mmol/L 112* 113* 113* 111*   CO2 mmol/L 22.0 20.6* 21.9* 20.5*   BUN mg/dL 37* 44* 49* 49*   CREATININE mg/dL 1.36* 1.40* 1.87* 2.06*   GLUCOSE mg/dL 116* 124* 141* 184*   EGFR IF NONAFRICN AM mL/min/1.73 50* 48* 35* 31*     Results from last 7 days   Lab Units 11/08/21  0427 11/07/21  0538 11/06/21  0550 11/05/21  1000 11/04/21  0608 11/04/21  0608 11/03/21  0447 11/03/21  0447   ALBUMIN g/dL 2.50* 2.60* 2.50* 2.60*   < > 2.60*   < > 3.00*   BILIRUBIN mg/dL  --   --   --  0.7  --  1.0  --  0.4   ALK PHOS U/L  --   --   --  79  --  76  --  89   AST (SGOT) U/L  --   --   --  31  --  19  --  18   ALT (SGPT) U/L  --   --   --  18  --  16  --  17    < > = values in this interval not displayed.     Results from last 7 days   Lab Units 11/08/21  0427 11/07/21  0538 11/06/21  0550 11/05/21  1000 11/04/21  0608 11/04/21  0608 11/03/21  0447 11/03/21  0447   CALCIUM mg/dL 7.7* 7.9* 7.9* 8.0*   < > 8.0*   < > 8.2*    ALBUMIN g/dL 2.50* 2.60* 2.50* 2.60*   < > 2.60*   < > 3.00*   MAGNESIUM mg/dL  --   --   --  2.6*  --  2.3  --  2.4   PHOSPHORUS mg/dL 2.9 3.0 3.1  --   --   --   --   --     < > = values in this interval not displayed.     Results from last 7 days   Lab Units 11/09/21  0603 11/08/21  0427 11/07/21  0537 11/06/21  0550 11/05/21  1000 11/04/21  0608 11/03/21  0447   PROTIME Seconds 22.5* 21.5* 22.3* 22.6* 24.9* 26.3* 25.0*   INR  2.00* 1.89* 1.98* 2.02* 2.28* 2.45* 2.29*     Glucose   Date/Time Value Ref Range Status   11/09/2021 0606 132 (H) 70 - 130 mg/dL Final     Comment:     Meter: [id number] : [id number] Traore Jessica NA   11/08/2021 2027 162 (H) 70 - 130 mg/dL Final     Comment:     Meter: [id number] : [id number] Bishop Julio RN   11/08/2021 1644 174 (H) 70 - 130 mg/dL Final     Comment:     Meter: [id number] : [id number] Keron Cartera NA   11/08/2021 1142 132 (H) 70 - 130 mg/dL Final     Comment:     Meter: [id number] : [id number] Cabrales Alyssa NA   11/08/2021 0559 129 70 - 130 mg/dL Final     Comment:     Meter: [id number] : [id number] Traore Jessica NA   11/07/2021 2014 172 (H) 70 - 130 mg/dL Final     Comment:     Meter: [id number] : [id number] Traore Jessica NA   11/07/2021 1644 125 70 - 130 mg/dL Final     Comment:     Meter: [id number] : [id number] Hua ALEJO       CT Abdomen Pelvis Without Contrast  11/4  CONCLUSION:  1. Right-sided pleural effusion appears slightly larger compared to the  previous examination, new small left-sided pleural effusion has  developed. There is diffuse body wall edema and cardiac enlargement.  Small amount of free fluid is demonstrated within the peritoneal cavity.  2. Sizable bilobed collection has developed within the right rectus  abdominis muscle, consistent with hematoma. See above dimensions.  3. Diverticulosis of the colon, subtle pericolonic induration about the  mid sigmoid suggests possible diverticulitis.  4. Urine demonstrates  "increased density, perhaps related to residual  contrast from previous CT or hemorrhage. Correlation with urinalysis  advised.  5. Gallstones.        Scheduled Medications  atorvastatin, 40 mg, Oral, Daily  dextrose, 25 g, Intravenous, Once  digoxin, 125 mcg, Oral, Daily  ferrous sulfate, 325 mg, Oral, Daily With Breakfast  guaiFENesin, 600 mg, Oral, Q12H  insulin lispro, 0-9 Units, Subcutaneous, 4x Daily With Meals & Nightly  magnesium oxide, 400 mg, Oral, BID  metoprolol succinate XL, 25 mg, Oral, Daily  pantoprazole, 40 mg, Intravenous, Q12H  sodium bicarbonate, 1,950 mg, Oral, BID    Infusions  sodium chloride, 75 mL/hr    Diet  NPO Diet       Assessment/Plan     Active Hospital Problems    Diagnosis  POA   • **Lower GI bleed [K92.2]  Yes   • Rectus sheath hematoma [S30.1XXA]  No   • Acute posthemorrhagic anemia [D62]  Yes   • History of nephrectomy [Z90.5]  Not Applicable   • Abnormal urinalysis [R82.90]  Yes   • Recurrent right pleural effusion [J90]  Yes   • 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       84 yo gentleman with h/o DM2, AFib, Mech AVR (on warfarin), HTN, CKD3, h/o RCC s/p right nephrectomy and h/o CVA with mild residual left hemiparesis, admitted here from 10/23 to 10/30 for \"obscure\" GI bleed and bilateral pleural effusions (transudate), who presented to ER with c/o rectal bleeding and generalized weakness. Found to have drop in Hgb from 9.3 to 7.2. Stool grossly " bloody in ER.     Hemoglobin remained stable.  INR up slightly.  GI planning endoscopy I believe later today.  Wife reports some increased cough primarily when eating or drinking.  Speech therapy has evaluated him and had recommended VFSS when GI issues resolved.    Pulmonology following, may need thoracentesis.  ST recommends VFSS when GI issues resolved.  BS relatively stable on SSI.      SCDs for DVT prophylaxis.  Full code.  Discussed with patient, family, nursing staff and consulting provider.  Anticipate discharge home with HH vs SNU facility timing yet to be determined.      Santos Guzman MD  [facility]  11/09/21  10:19 EST

## 2021-11-09 NOTE — PROGRESS NOTES
"    Patient Name: Francisco Sequeira  :1935  85 y.o.      Patient Care Team:  Rubin Hinkle APRN as PCP - General (Family Medicine)  Audra Vazquez RPH as Pharmacist  Vasquez Niño PharmD as Pharmacist (Pharmacy)    Chief Complaint: follow up GIB, mechanical aortic valve    Interval History: He is on room air. He does not really feel short of breath.    Objective   Vital Signs  Temp:  [97.4 °F (36.3 °C)-98.1 °F (36.7 °C)] 98 °F (36.7 °C)  Heart Rate:  [78-91] 88  Resp:  [16] 16  BP: (121-154)/(68-78) 154/69    Intake/Output Summary (Last 24 hours) at 2021 1505  Last data filed at 2021 1328  Gross per 24 hour   Intake 1000 ml   Output 685 ml   Net 315 ml     Flowsheet Rows      First Filed Value   Admission Height 182.9 cm (72\") Documented at 2021 0344   Admission Weight 81.6 kg (180 lb) Documented at 2021 0344          Physical Exam:   General Appearance:    Alert, cooperative, in no acute distress   Lungs:     Clear to auscultation.  Normal respiratory effort and rate.      Heart:    Regular rhythm and normal rate, normal S1 and S2, no murmurs, gallops or rubs.     Chest Wall:    No abnormalities observed   Abdomen:     Soft, nontender, positive bowel sounds.     Extremities:   no cyanosis, clubbing or edema.  No marked joint deformities.  Adequate musculoskeletal strength.       Results Review:    Results from last 7 days   Lab Units 21  1109   SODIUM mmol/L 145   POTASSIUM mmol/L 4.2   CHLORIDE mmol/L 113*   CO2 mmol/L 21.5*   BUN mg/dL 25*   CREATININE mg/dL 1.37*   GLUCOSE mg/dL 134*   CALCIUM mg/dL 7.9*         Results from last 7 days   Lab Units 21  0603   WBC 10*3/mm3 7.19   HEMOGLOBIN g/dL 8.4*   HEMATOCRIT % 26.3*   PLATELETS 10*3/mm3 219     Results from last 7 days   Lab Units 21  0603 21  0427 21  0537   INR  2.00* 1.89* 1.98*     Results from last 7 days   Lab Units 21  1000   MAGNESIUM mg/dL 2.6*                   Medication Review: "   atorvastatin, 40 mg, Oral, Daily  dextrose, 25 g, Intravenous, Once  digoxin, 125 mcg, Oral, Daily  ferrous sulfate, 325 mg, Oral, Daily With Breakfast  guaiFENesin, 600 mg, Oral, Q12H  insulin lispro, 0-9 Units, Subcutaneous, 4x Daily With Meals & Nightly  magnesium oxide, 400 mg, Oral, BID  metoprolol succinate XL, 25 mg, Oral, Daily  pantoprazole, 40 mg, Intravenous, Q12H  sodium bicarbonate, 1,950 mg, Oral, BID         sodium chloride, 75 mL/hr, Last Rate: 75 mL/hr (11/09/21 1255)  sodium chloride, 30 mL/hr        Assessment/Plan   1. Acute lower GIB - It is safer to allow INR to drift down given risk for acute prosthetic valve thrombosis with FFP or vitamin K. Scope today.  3. Mechanical aortic valve - start heparin after scope if ok with gi  4. Chronic anticoagulation  5. Chronic kidney disease  6. Pleural effusion s/p right thoracentesis (1650 removed 10/25)  7. History of stroke with therapeutic INR (2.2) at the time. Target increased to 3-3.5.  8. Rectal sheath hematoma, no surgical intervention.   9. Pulmonary hypertension    colonoscopy today. Heparin after if ok with GI.     LIZA Krishnamurthy  [facility]  11/09/21  15:05 EST

## 2021-11-09 NOTE — ANESTHESIA POSTPROCEDURE EVALUATION
Patient: Francisco Sequeira    Procedure Summary     Date: 11/09/21 Room / Location:  [facility] /  [facility]    Anesthesia Start: 1727 Anesthesia Stop: 1800    Procedure: COLONOSCOPY to cecum with APC cautery of AVM (N/A ) Diagnosis:       Lower GI bleed      (Lower GI bleed [K92.2])    Surgeons: Pavan Rodriguez MD Provider: Gurvinder Guillen MD    Anesthesia Type: MAC ASA Status: 4          Anesthesia Type: MAC    Vitals  Vitals Value Taken Time   BP 93/52 11/09/21 1757   Temp     Pulse 93 11/09/21 1757   Resp 16 11/09/21 1757   SpO2 100 % 11/09/21 1757           Post Anesthesia Care and Evaluation    Patient location during evaluation: PHASE II  Patient participation: complete - patient participated  Level of consciousness: awake and alert  Pain management: adequate  Airway patency: patent  Anesthetic complications: No anesthetic complications  PONV Status: none  Cardiovascular status: acceptable and hemodynamically stable  Respiratory status: acceptable, nonlabored ventilation and spontaneous ventilation  Hydration status: acceptable

## 2021-11-09 NOTE — PROGRESS NOTES
[facility] Consult Note          PATIENT IDENTIFICATION:   Name:  Francisco Sequeira      MRN:  [id number]     85 y.o.  male               SUBJECTIVE:   No new complaints today.  N.p.o. awaiting colonoscopy this morning.  INR subtherapeutic.  No events overnight    OBJECTIVE:  Vitals:    11/08/21 1921 11/08/21 2301 11/09/21 0744 11/09/21 0904   BP: 121/69 141/78 131/68    BP Location: Right arm Right arm Right arm    Patient Position: Lying Lying Lying    Pulse: 82 91 79 88   Resp: 16 16 16    Temp: 98.1 °F (36.7 °C) 97.9 °F (36.6 °C) 97.4 °F (36.3 °C)    TempSrc: Oral Oral Oral    SpO2:  90% 97%    Weight:       Height:               Body mass index is 24.41 kg/m².    Intake/Output Summary (Last 24 hours) at 11/9/2021 1007  Last data filed at 11/9/2021 0744  Gross per 24 hour   Intake 1000 ml   Output 610 ml   Net 390 ml     Wt Readings from Last 1 Encounters:   11/02/21 0344 81.6 kg (180 lb)     Wt Readings from Last 3 Encounters:   11/02/21 81.6 kg (180 lb)   10/24/21 85.3 kg (188 lb)   07/30/21 86.2 kg (190 lb)       Physical Exam:  NAD; pleasant; partially oriented   Pale; looks stated age  MMM; AT/NC   No eye discharge; no scleral icterus  No JVD; no carotid bruits  Decreased breath sounds in bases bilat; not labored on RA  Irregularly irregular, metal click, 2/6M  Firm, +T but no G or R, +D, BS+  Trace LE edema  No clubbing  No asterixis  Moves all extremities   Mood and affect are normal    Scheduled meds:    atorvastatin, 40 mg, Oral, Daily  dextrose, 25 g, Intravenous, Once  digoxin, 125 mcg, Oral, Daily  ferrous sulfate, 325 mg, Oral, Daily With Breakfast  guaiFENesin, 600 mg, Oral, Q12H  insulin lispro, 0-9 Units, Subcutaneous, 4x Daily With Meals & Nightly  magnesium oxide, 400 mg, Oral, BID  metoprolol succinate XL, 25 mg, Oral, Daily  pantoprazole, 40 mg, Intravenous, Q12H  sodium bicarbonate, 1,950 mg, Oral, BID      IV meds:                        sodium chloride, 75  mL/hr        Data Review:    Results from last 7 days   Lab Units 11/08/21 0427 11/07/21  0538 11/06/21  0550 11/05/21  1000 11/05/21 1000 11/04/21  0608 11/04/21 0608 11/03/21 0447 11/03/21 0447   SODIUM mmol/L 142 144 146*   < > 144   < > 141   < > 139   POTASSIUM mmol/L 4.1 4.4 4.4   < > 5.0   < > 5.0   < > 5.0   CHLORIDE mmol/L 112* 113* 113*   < > 111*   < > 109*   < > 107   CO2 mmol/L 22.0 20.6* 21.9*   < > 20.5*   < > 24.3   < > 23.2   BUN mg/dL 37* 44* 49*   < > 49*   < > 40*   < > 35*   CREATININE mg/dL 1.36* 1.40* 1.87*   < > 2.06*   < > 1.89*   < > 1.55*   CALCIUM mg/dL 7.7* 7.9* 7.9*   < > 8.0*   < > 8.0*   < > 8.2*   BILIRUBIN mg/dL  --   --   --   --  0.7  --  1.0  --  0.4   ALK PHOS U/L  --   --   --   --  79  --  76  --  89   ALT (SGPT) U/L  --   --   --   --  18  --  16  --  17   AST (SGOT) U/L  --   --   --   --  31  --  19  --  18   GLUCOSE mg/dL 116* 124* 141*   < > 184*   < > 151*   < > 261*    < > = values in this interval not displayed.     Estimated Creatinine Clearance: 47.5 mL/min (A) (by C-G formula based on SCr of 1.36 mg/dL (H)).  Results from last 7 days   Lab Units 11/05/21  1808   SODIUM UR mmol/L 26   CREATININE UR mg/dL 91.0     Results from last 7 days   Lab Units 11/08/21 0427 11/07/21 0538 11/06/21  0550 11/05/21 1000 11/04/21 0608 11/03/21 0447   MAGNESIUM mg/dL  --   --   --  2.6* 2.3 2.4   PHOSPHORUS mg/dL 2.9 3.0 3.1  --   --   --        Results from last 7 days   Lab Units 11/09/21  0603 11/08/21  1737 11/08/21  0427 11/07/21  2151 11/07/21  1351 11/07/21  0538 11/07/21  0538 11/06/21  1805 11/06/21  0550 11/05/21  1758 11/05/21  1000   WBC 10*3/mm3 7.19  --  7.23  --   --   --  10.92*  --  12.45*  --  16.08*   HEMOGLOBIN g/dL 8.4* 9.5* 8.4* 8.2* 8.5*   < > 8.5*   < > 6.9*   < > 7.6*  7.6*   PLATELETS 10*3/mm3 219  --  195  --   --   --  182  --  145  --  265    < > = values in this interval not displayed.       Results from last 7 days   Lab Units 11/09/21 0603  11/08/21  0427 11/07/21  0537 11/06/21  0550 11/05/21  1000   INR  2.00* 1.89* 1.98* 2.02* 2.28*             ASSESSMENT:     Lower GI bleed    Atrial fibrillation (HCC)    Hypertension    Uncontrolled type 2 diabetes mellitus with complication, with long-term current use of insulin (HCC)    Hx of aortic valve replacement, mechanical [Z95.2]    Stage 3b chronic kidney disease (HCC)    Chronic anticoagulation    History of nephrectomy    Abnormal urinalysis    Recurrent right pleural effusion    Hemiparesis of left nondominant side as late effect of cerebral infarction (HCC)    Rectus sheath hematoma    Acute posthemorrhagic anemia    1. BYRON on CKD3, non-oliguric, better and back to baseline:  prerenal due to hypovolemia, blood loss, hypotension, and recent contrast exposure on 11/2/21.  Bladder scans are normal.  Creatinine was 1.36 as of yesterday.  Today's labs are pending  2. Acute GI bleed with bloody stools noted this admission and tagged RBC scan showing bleed in the RLQ consistent with distal ileum or proximal colon.  Awaiting colonoscopy that is planned while INR is subtherapeutic  3. Acute blood loss anemia related to GI bleed. He has received 6 units PRBCs this admission.  Hemoglobin has been stable as of yesterday we will recheck today.  4. History of mechanical AVR and Atrial Fibrillation anticoagulated with warfarin. His warfarin is being held and IV heparin drip infusing  5. Hypertension, controlled  6. Recurrent right pleural effusion; transudate per last thoracentesis. Repeat thoracentesis planned when more stable. Home lasix of 40 mg daily is being held.   7. Type II diabetes. Hgb A1c 5.2 last admission.  8. History of CVA with left side hemiparesis.  9. Mild hypophosphatemia.  Likely secondary to decreased oral intake.  No indication for replacement this time.  Awaiting today's lab        PLAN:  1. Start the patient on normal saline at 75 cc an hour given that he is n.p.o. we will stop after the  patient is able to resume p.o. intake  2. Avoid nephrotoxic agents  3. Surveillance labs    Laz Fernandes MD  11/9/2021  10:07 EST

## 2021-11-09 NOTE — PLAN OF CARE
Goal Outcome Evaluation:  Plan of Care Reviewed With: patient, spouse        Progress: improving  Outcome Summary: VSS; no c/o pain; pt had colonoscopy done; diet is advanced to clear liquid; continue to monitor.

## 2021-11-09 NOTE — PLAN OF CARE
Goal Outcome Evaluation:  Plan of Care Reviewed With: patient   Progress: no change  Outcome Summary: VSS. No C/o pain or nausea, Pt finished bowel prep about 10pm, NPO at midnight. Plan for Scope this am. Will continue to monitor.

## 2021-11-09 NOTE — NURSING NOTE
Pt's INR is more elevated today,  Dr. Rodriguez is aware and ok w/ continuing with colonoscopy this evening.

## 2021-11-10 PROBLEM — K55.20 ANGIODYSPLASIA OF COLON WITHOUT HEMORRHAGE: Status: ACTIVE | Noted: 2021-11-10

## 2021-11-10 LAB
ALBUMIN SERPL-MCNC: 2.6 G/DL (ref 3.5–5.2)
ANION GAP SERPL CALCULATED.3IONS-SCNC: 9.4 MMOL/L (ref 5–15)
BUN SERPL-MCNC: 21 MG/DL (ref 8–23)
BUN/CREAT SERPL: 20 (ref 7–25)
CALCIUM SPEC-SCNC: 8.1 MG/DL (ref 8.6–10.5)
CHLORIDE SERPL-SCNC: 111 MMOL/L (ref 98–107)
CO2 SERPL-SCNC: 22.6 MMOL/L (ref 22–29)
CREAT SERPL-MCNC: 1.05 MG/DL (ref 0.76–1.27)
DEPRECATED RDW RBC AUTO: 63 FL (ref 37–54)
ERYTHROCYTE [DISTWIDTH] IN BLOOD BY AUTOMATED COUNT: 20.2 % (ref 12.3–15.4)
GFR SERPL CREATININE-BSD FRML MDRD: 67 ML/MIN/1.73
GLUCOSE BLDC GLUCOMTR-MCNC: 119 MG/DL (ref 70–130)
GLUCOSE BLDC GLUCOMTR-MCNC: 130 MG/DL (ref 70–130)
GLUCOSE BLDC GLUCOMTR-MCNC: 143 MG/DL (ref 70–130)
GLUCOSE BLDC GLUCOMTR-MCNC: 165 MG/DL (ref 70–130)
GLUCOSE SERPL-MCNC: 117 MG/DL (ref 65–99)
HCT VFR BLD AUTO: 30 % (ref 37.5–51)
HGB BLD-MCNC: 9.1 G/DL (ref 13–17.7)
INR PPP: 1.73 (ref 0.9–1.1)
MCH RBC QN AUTO: 25.5 PG (ref 26.6–33)
MCHC RBC AUTO-ENTMCNC: 30.3 G/DL (ref 31.5–35.7)
MCV RBC AUTO: 84 FL (ref 79–97)
PHOSPHATE SERPL-MCNC: 2.8 MG/DL (ref 2.5–4.5)
PLATELET # BLD AUTO: 244 10*3/MM3 (ref 140–450)
PMV BLD AUTO: 11.2 FL (ref 6–12)
POTASSIUM SERPL-SCNC: 4 MMOL/L (ref 3.5–5.2)
PROTHROMBIN TIME: 20 SECONDS (ref 11.7–14.2)
RBC # BLD AUTO: 3.57 10*6/MM3 (ref 4.14–5.8)
SODIUM SERPL-SCNC: 143 MMOL/L (ref 136–145)
WBC # BLD AUTO: 7.25 10*3/MM3 (ref 3.4–10.8)

## 2021-11-10 PROCEDURE — 99232 SBSQ HOSP IP/OBS MODERATE 35: CPT | Performed by: INTERNAL MEDICINE

## 2021-11-10 PROCEDURE — 85610 PROTHROMBIN TIME: CPT | Performed by: INTERNAL MEDICINE

## 2021-11-10 PROCEDURE — 99232 SBSQ HOSP IP/OBS MODERATE 35: CPT | Performed by: NURSE PRACTITIONER

## 2021-11-10 PROCEDURE — 82962 GLUCOSE BLOOD TEST: CPT

## 2021-11-10 PROCEDURE — 85027 COMPLETE CBC AUTOMATED: CPT | Performed by: INTERNAL MEDICINE

## 2021-11-10 PROCEDURE — 80069 RENAL FUNCTION PANEL: CPT | Performed by: INTERNAL MEDICINE

## 2021-11-10 RX ORDER — FUROSEMIDE 40 MG/1
40 TABLET ORAL DAILY
Status: DISCONTINUED | OUTPATIENT
Start: 2021-11-10 | End: 2021-11-13

## 2021-11-10 RX ADMIN — SODIUM CHLORIDE 75 ML/HR: 4.5 INJECTION, SOLUTION INTRAVENOUS at 06:19

## 2021-11-10 RX ADMIN — MAGNESIUM OXIDE 400 MG (241.3 MG MAGNESIUM) TABLET 400 MG: TABLET at 08:14

## 2021-11-10 RX ADMIN — SODIUM BICARBONATE 1950 MG: 650 TABLET ORAL at 21:30

## 2021-11-10 RX ADMIN — GUAIFENESIN 600 MG: 600 TABLET, EXTENDED RELEASE ORAL at 08:13

## 2021-11-10 RX ADMIN — METOPROLOL SUCCINATE 25 MG: 25 TABLET, EXTENDED RELEASE ORAL at 08:14

## 2021-11-10 RX ADMIN — FERROUS SULFATE TAB 325 MG (65 MG ELEMENTAL FE) 325 MG: 325 (65 FE) TAB at 08:13

## 2021-11-10 RX ADMIN — DIGOXIN 125 MCG: 250 TABLET ORAL at 08:13

## 2021-11-10 RX ADMIN — GUAIFENESIN 600 MG: 600 TABLET, EXTENDED RELEASE ORAL at 21:29

## 2021-11-10 RX ADMIN — ATORVASTATIN CALCIUM 40 MG: 20 TABLET, FILM COATED ORAL at 08:13

## 2021-11-10 RX ADMIN — SODIUM BICARBONATE 1950 MG: 650 TABLET ORAL at 08:14

## 2021-11-10 RX ADMIN — PANTOPRAZOLE SODIUM 40 MG: 40 INJECTION, POWDER, FOR SOLUTION INTRAVENOUS at 08:14

## 2021-11-10 RX ADMIN — FUROSEMIDE 40 MG: 40 TABLET ORAL at 14:48

## 2021-11-10 RX ADMIN — MAGNESIUM OXIDE 400 MG (241.3 MG MAGNESIUM) TABLET 400 MG: TABLET at 21:30

## 2021-11-10 RX ADMIN — PANTOPRAZOLE SODIUM 40 MG: 40 INJECTION, POWDER, FOR SOLUTION INTRAVENOUS at 21:30

## 2021-11-10 NOTE — PLAN OF CARE
Goal Outcome Evaluation:    Progress: improving  Outcome Summary: VSS. No c/o pain or SOA. Pt voiding, small BM. Will continue to monitor.

## 2021-11-10 NOTE — CASE MANAGEMENT/SOCIAL WORK
Continued Stay Note  [facility]     Patient Name: Francisco Sequeira  MRN: [id number]  Today's Date: 11/10/2021    Admit Date: 11/2/2021     Discharge Plan     Row Name 11/10/21 1646       Plan    Plan Home with spouse and continue services with [facility] . Family to transport    Patient/Family in Agreement with Plan yes    Plan Comments Plan for thoracentesis in AM. Per GI note,okay to restart the heparin drip. Aquilino Proctor RN-BC               Discharge Codes    No documentation.               Expected Discharge Date and Time     Expected Discharge Date Expected Discharge Time    Nov 15, 2021             Aquilino Proctor RN

## 2021-11-10 NOTE — NURSING NOTE
Spoke with Fatmata with cardiology in regards to Iza Mckeon recommending heparin drip to be started. Per Fatmata, heparin drip would need to be held 12 hours prior to thoracentesis. Thoracentesis scheduled for 0800 tomorrow. Per Fatmata they will re-evaluate tomorrow after procedure for drip to be started.

## 2021-11-10 NOTE — PROGRESS NOTES
Lebron Cano MD                          [phone]      Patient ID:    Name:  Francisco Sequeira    MRN:  [id number]    1935   85 y.o.  male            Patient Care Team:  Rubin Hinkle APRN as PCP - General (Family Medicine)  Audra Vazquez RPH as Pharmacist  Vasquez Niño PharmD as Pharmacist (Pharmacy)    CC/ Reason for visit: Pleural effusions, GI bleed, heart failure, pulmonary pretension    Subjective: Pt seen and examined this AM. No acute overnight events noted. Doing better.  Not requiring supplemental oxygen.  Underwent endoscopies and is doing fairly well.  Coumadin is on hold.  Heparin drip is being used postprocedure.    ROS: Denies any subjective fevers, syncope or presyncopal events, new neurological deficits, nausea or vomiting currently    Objective     Vital Signs past 24hrs    BP range: BP: ()/(52-82) 134/52  Pulse range: Heart Rate:  [] 88  Resp rate range: Resp:  [16-18] 18  Temp range: Temp (24hrs), Av.1 °F (36.7 °C), Min:97.6 °F (36.4 °C), Max:99.1 °F (37.3 °C)      Ventilator/Non-Invasive Ventilation Settings (From admission, onward)            None          Device (Oxygen Therapy): room air       81.6 kg (180 lb); Body mass index is 24.41 kg/m².      Intake/Output Summary (Last 24 hours) at 11/10/2021 1552  Last data filed at 11/10/2021 1500  Gross per 24 hour   Intake 2442 ml   Output 700 ml   Net 1742 ml       PHYSICAL EXAM   Constitutional: Middle aged pt in bed, No acute respiratory distress, no accessory muscle use  Head: - NCAT  Eyes: No pallor.  Anicteric sclerae, EOMI.  ENMT:  Mallampati 3, no oral thrush. Moist MM.   NECK: Trachea midline, No thyromegaly, no palpable cervical lymphadenopathy  Heart: RRR, no murmur. No pedal edema   Lungs: DOMITILA +, decreased breath sounds at the right base, no wheezes/ crackles heard    Abdomen: Soft. No tenderness, guarding or rigidity. No palpable masses  Extremities: Extremities warm and  well perfused. No cyanosis/ clubbing  Neuro: Conscious, answers appropriately, no gross focal neuro deficits  Psych: Mood and affect appropriate    PPE recommended per [facility] infectious disease Isolation protocol for the current clinical scenario(as mentioned below) was followed.     Scheduled meds:  atorvastatin, 40 mg, Oral, Daily  dextrose, 25 g, Intravenous, Once  digoxin, 125 mcg, Oral, Daily  ferrous sulfate, 325 mg, Oral, Daily With Breakfast  furosemide, 40 mg, Oral, Daily  guaiFENesin, 600 mg, Oral, Q12H  insulin lispro, 0-9 Units, Subcutaneous, 4x Daily With Meals & Nightly  magnesium oxide, 400 mg, Oral, BID  metoprolol succinate XL, 25 mg, Oral, Daily  pantoprazole, 40 mg, Intravenous, Q12H  sodium bicarbonate, 1,950 mg, Oral, BID        IV meds:                      sodium chloride, 30 mL/hr        Data Review:      Results from last 7 days   Lab Units 11/10/21  0618 11/09/21  1109 11/09/21  0603 11/08/21  1737 11/08/21  0427 11/08/21  0427 11/05/21  1758 11/05/21  1000 11/04/21  1156 11/04/21  0608   SODIUM mmol/L 143 145  --   --   --  142   < > 144  --  141   POTASSIUM mmol/L 4.0 4.2  --   --   --  4.1   < > 5.0  --  5.0   CHLORIDE mmol/L 111* 113*  --   --   --  112*   < > 111*  --  109*   CO2 mmol/L 22.6 21.5*  --   --   --  22.0   < > 20.5*  --  24.3   BUN mg/dL 21 25*  --   --   --  37*   < > 49*  --  40*   CREATININE mg/dL 1.05 1.37*  --   --   --  1.36*   < > 2.06*  --  1.89*   CALCIUM mg/dL 8.1* 7.9*  --   --   --  7.7*   < > 8.0*  --  8.0*   BILIRUBIN mg/dL  --   --   --   --   --   --   --  0.7  --  1.0   ALK PHOS U/L  --   --   --   --   --   --   --  79  --  76   ALT (SGPT) U/L  --   --   --   --   --   --   --  18  --  16   AST (SGOT) U/L  --   --   --   --   --   --   --  31  --  19   GLUCOSE mg/dL 117* 134*  --   --   --  116*   < > 184*  --  151*   WBC 10*3/mm3 7.25  --  7.19  --   --  7.23   < > 16.08*  --  14.55*   HEMOGLOBIN g/dL 9.1*  --  8.4* 9.5*   < > 8.4*   < > 7.6*   7.6*   < > 8.0*   PLATELETS 10*3/mm3 244  --  219  --   --  195   < > 265  --  130*   INR  1.73*  --  2.00*  --   --  1.89*   < > 2.28*  --  2.45*    < > = values in this interval not displayed.       Lab Results   Component Value Date    CALCIUM 8.1 (L) 11/10/2021    PHOS 2.8 11/10/2021                    Results Review:    I have reviewed the relevant laboratory results and independently reviewed the chest imaging from this hospitalization including the available echocardiogram reports personally and summarized it if/ when appropriate below    Assessment    Rt> Lt pl effusion  Acute GI bleed; lower  Previous mechanical aortic valve replacement  Pulmonary pretension  A. fib  Anemia    PLAN:  Patient is doing well from a respiratory standpoint.  Not requiring any supplemental oxygen. Volume status seem to be appropriate as well  Noted cardiology and GI input.  Renal function stable  Given persistent moderate to large right-sided pleural effusion and patient overall doing better we will go ahead and get a thoracentesis prior to discharge now that anticoagulation has been held for other procedures.  Discussed with Dr. Guzman and patient's wife as well who understands and agrees with the plan.  Risks and benefits explained.    Discussed with patient and wife at bedside    Lebron Cano MD  11/10/2021

## 2021-11-10 NOTE — PROGRESS NOTES
[facility]  Inpatient Progress Note    Reason for Follow Up:  Overt GI bleeding while on coumadin    Subjective     Interval History:   He tolerated a colonoscopy 11/9 when an AVM in the ascending colon was burned and clipped.  His hemoglobin is 9.1 today.  Cardiology wants to restart the heparin drip because of his mechanical aortic valve and history of a stroke.    Current Facility-Administered Medications:   •  acetaminophen (TYLENOL) tablet 650 mg, 650 mg, Oral, Q4H PRN **OR** [DISCONTINUED] acetaminophen (TYLENOL) 160 MG/5ML solution 650 mg, 650 mg, Oral, Q4H PRN **OR** [DISCONTINUED] acetaminophen (TYLENOL) suppository 650 mg, 650 mg, Rectal, Q4H PRN, Carmen Welch APRN  •  atorvastatin (LIPITOR) tablet 40 mg, 40 mg, Oral, Daily, Pavan Rodriguez MD, 40 mg at 11/10/21 0813  •  dextrose (D50W) (25 g/50 mL) IV injection 25 g, 25 g, Intravenous, Q15 Min PRN, Pavan Rodriguez MD  •  dextrose (D50W) (25 g/50 mL) IV injection 25 g, 25 g, Intravenous, Once, Pavan Rodriguez MD  •  dextrose (GLUTOSE) oral gel 15 g, 15 g, Oral, Q15 Min PRN, Pavan Rodriguez MD  •  digoxin (LANOXIN) tablet 125 mcg, 125 mcg, Oral, Daily, Pavan Rodriguez MD, 125 mcg at 11/10/21 0813  •  diphenhydrAMINE (BENADRYL) capsule 25 mg, 25 mg, Oral, BID PRN, Pavan Rodriguez MD  •  ferrous sulfate tablet 325 mg, 325 mg, Oral, Daily With Breakfast, Pavan Rodriguez MD, 325 mg at 11/10/21 0813  •  furosemide (LASIX) tablet 40 mg, 40 mg, Oral, Daily, Laz Fernandes MD, 40 mg at 11/10/21 1448  •  glucagon (human recombinant) (GLUCAGEN DIAGNOSTIC) injection 1 mg, 1 mg, Subcutaneous, PRN, Pavan Rodriguez MD  •  guaiFENesin (MUCINEX) 12 hr tablet 600 mg, 600 mg, Oral, Q12H, Pavan Rodriguez MD, 600 mg at 11/10/21 0813  •  HYDROcodone-acetaminophen (NORCO) 5-325 MG per tablet 1 tablet, 1 tablet, Oral, Q6H PRN, Pavan Rodriguez MD, 1 tablet at 11/02/21 3579  •  insulin lispro  (ADMELOG) injection 0-9 Units, 0-9 Units, Subcutaneous, 4x Daily With Meals & Nightly, Pavan Rodriguez MD, 2 Units at 11/08/21 1749  •  magnesium oxide (MAG-OX) tablet 400 mg, 400 mg, Oral, BID, Pavan Rodriguez MD, 400 mg at 11/10/21 0814  •  metoprolol succinate XL (TOPROL-XL) 24 hr tablet 25 mg, 25 mg, Oral, Daily, Pavan Rodriguez MD, 25 mg at 11/10/21 0814  •  nitroglycerin (NITROSTAT) SL tablet 0.4 mg, 0.4 mg, Sublingual, Q5 Min PRN, Pavan Rodriguez MD  •  ondansetron (ZOFRAN) tablet 4 mg, 4 mg, Oral, Q6H PRN **OR** ondansetron (ZOFRAN) injection 4 mg, 4 mg, Intravenous, Q6H PRN, Pavan Rodriguez MD, 4 mg at 11/04/21 0008  •  pantoprazole (PROTONIX) injection 40 mg, 40 mg, Intravenous, Q12H, Pavan Rodriguez MD, 40 mg at 11/10/21 0814  •  sodium bicarbonate tablet 1,950 mg, 1,950 mg, Oral, BID, Pavan Rodriguez MD, 1,950 mg at 11/10/21 0814  •  sodium chloride 0.9 % infusion, 30 mL/hr, Intravenous, Continuous PRN, Pavan Rodriguez MD, New Bag at 11/09/21 1727  Review of Systems:    Review of systems could not be obtained due to  patient confusion.    Objective     Vital Signs  Temp:  [97.6 °F (36.4 °C)-99.1 °F (37.3 °C)] 99.1 °F (37.3 °C)  Heart Rate:  [] 88  Resp:  [16-18] 18  BP: ()/(52-82) 134/52  Body mass index is 24.41 kg/m².    Intake/Output Summary (Last 24 hours) at 11/10/2021 1511  Last data filed at 11/10/2021 0729  Gross per 24 hour   Intake 2442 ml   Output 600 ml   Net 1842 ml     I/O this shift:  In: -   Out: 200 [Urine:200]     Physical Exam:   General: patient awake, alert and cooperative   Eyes: Normal lids and lashes, no scleral icterus   Neck: supple, normal ROM   Skin: warm and dry, not jaundiced   Cardiovascular: regular rhythm and rate, no murmurs auscultated   Pulm: clear to auscultation bilaterally, regular and unlabored   Abdomen: soft, nontender, nondistended; normal bowel sounds   Extremities: no rash or edema   Psychiatric: Normal mood  and behavior; memory intact     Results Review:     I reviewed the patient's new clinical results.    Results from last 7 days   Lab Units 11/10/21  0618 11/09/21  0603 11/08/21  1737 11/08/21 0427 11/08/21 0427   WBC 10*3/mm3 7.25 7.19  --   --  7.23   HEMOGLOBIN g/dL 9.1* 8.4* 9.5*   < > 8.4*   HEMATOCRIT % 30.0* 26.3* 30.8*   < > 26.3*   PLATELETS 10*3/mm3 244 219  --   --  195    < > = values in this interval not displayed.     Results from last 7 days   Lab Units 11/10/21  0618 11/09/21  1109 11/08/21 0427 11/06/21  0550 11/05/21  1000 11/04/21  0608 11/04/21  0608   SODIUM mmol/L 143 145 142   < > 144   < > 141   POTASSIUM mmol/L 4.0 4.2 4.1   < > 5.0   < > 5.0   CHLORIDE mmol/L 111* 113* 112*   < > 111*   < > 109*   CO2 mmol/L 22.6 21.5* 22.0   < > 20.5*   < > 24.3   BUN mg/dL 21 25* 37*   < > 49*   < > 40*   CREATININE mg/dL 1.05 1.37* 1.36*   < > 2.06*   < > 1.89*   CALCIUM mg/dL 8.1* 7.9* 7.7*   < > 8.0*   < > 8.0*   BILIRUBIN mg/dL  --   --   --   --  0.7  --  1.0   ALK PHOS U/L  --   --   --   --  79  --  76   ALT (SGPT) U/L  --   --   --   --  18  --  16   AST (SGOT) U/L  --   --   --   --  31  --  19   GLUCOSE mg/dL 117* 134* 116*   < > 184*   < > 151*    < > = values in this interval not displayed.     Results from last 7 days   Lab Units 11/10/21  0618 11/09/21  0603 11/08/21 0427   INR  1.73* 2.00* 1.89*     No results found for: LIPASE    Radiology:  XR Chest 1 View   Final Result      CT Abdomen Pelvis Without Contrast   Final Result      XR Chest 1 View   Final Result   Moderate right pleural effusion appears decreased from the   prior exam. Atelectasis/infiltrate is apparent in the right lower lung.   Continued follow-up recommended.         This report was finalized on 11/2/2021 4:34 PM by Dr. Chava Luke M.D.          NM GI Blood Loss   Final Result   Active GI bleeding from the right lower quadrant in the   region of either the distal ileum or the proximal ascending colon. I    discussed the case with Dr. Al Gerard at around 3:45 PM on the   day of the exam.       This report was finalized on 11/2/2021 5:51 PM by Dr. Grant Jorge M.D.          CT Abdomen Pelvis With Contrast   Final Result       1. No obvious etiology for the patient's GI bleeding is identified.   Patient does have colonic diverticulosis. There is no diverticulitis.   2. Large right pleural effusion. This may be slightly smaller than on   prior study. Previously identified left pleural effusion has almost   completely resolved.       Radiation dose reduction techniques were utilized, including automated   exposure control and exposure modulation based on body size.       This report was finalized on 11/2/2021 5:38 AM by Dr. Yue Frye M.D.              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 GI bleed with melena and bright red rectal bleeding, resolved. He tolerated a colonoscopy 11/9 when an AVM was found and treated in the ascending colon.   2. Abnormal tagged red blood cell scan  positive with activity in the cecum/terminal ileum  3. Acute blood loss anemia, stable  4. Anticoagulated on Coumadin, currently held  5. Abdominal pain, resolved  6. Hypercoagulable, INR trending down, currently 1.73  7. History of mechanical aortic valve  8. Acute kidney injury on chronic kidney disease 3        Plan:  -From a GI standpoint it is okay to restart the heparin drip and we will pray that he does not bleed.  -Continue serial H&H's and transfuse as necessary.    I discussed the patients findings and my recommendations with patient, family and nursing staff.    Oniel Moon MD

## 2021-11-10 NOTE — PROGRESS NOTES
"    Patient Name: Francisco Sequeira  :1935  85 y.o.      Patient Care Team:  Rubin Hinkle APRN as PCP - General (Family Medicine)  Audra Vazquez RPH as Pharmacist  Vasquez Niño PharmD as Pharmacist (Pharmacy)    Chief Complaint: follow up GIB, mechanical aortic valve    Interval History: He is on room air. He does not really feel short of breath.    Objective   Vital Signs  Temp:  [97.6 °F (36.4 °C)-99.1 °F (37.3 °C)] 99.1 °F (37.3 °C)  Heart Rate:  [] 100  Resp:  [16-18] 18  BP: ()/(52-82) 134/52    Intake/Output Summary (Last 24 hours) at 11/10/2021 1406  Last data filed at 11/10/2021 0729  Gross per 24 hour   Intake 2442 ml   Output 600 ml   Net 1842 ml     Flowsheet Rows      First Filed Value   Admission Height 182.9 cm (72\") Documented at 2021 0344   Admission Weight 81.6 kg (180 lb) Documented at 2021 0344          Physical Exam:   General Appearance:    Alert, cooperative, in no acute distress   Lungs:     Clear to auscultation.  Normal respiratory effort and rate.      Heart:    Regular rhythm and normal rate, normal S1 and S2, no murmurs, gallops or rubs.     Chest Wall:    No abnormalities observed   Abdomen:     Soft, nontender, positive bowel sounds.     Extremities:   no cyanosis, clubbing or edema.  No marked joint deformities.  Adequate musculoskeletal strength.       Results Review:    Results from last 7 days   Lab Units 11/10/21  0618   SODIUM mmol/L 143   POTASSIUM mmol/L 4.0   CHLORIDE mmol/L 111*   CO2 mmol/L 22.6   BUN mg/dL 21   CREATININE mg/dL 1.05   GLUCOSE mg/dL 117*   CALCIUM mg/dL 8.1*         Results from last 7 days   Lab Units 11/10/21  0618   WBC 10*3/mm3 7.25   HEMOGLOBIN g/dL 9.1*   HEMATOCRIT % 30.0*   PLATELETS 10*3/mm3 244     Results from last 7 days   Lab Units 11/10/21  0618 21  0603 21  0427   INR  1.73* 2.00* 1.89*     Results from last 7 days   Lab Units 21  1000   MAGNESIUM mg/dL 2.6*                   Medication " Review:   atorvastatin, 40 mg, Oral, Daily  dextrose, 25 g, Intravenous, Once  digoxin, 125 mcg, Oral, Daily  ferrous sulfate, 325 mg, Oral, Daily With Breakfast  furosemide, 40 mg, Oral, Daily  guaiFENesin, 600 mg, Oral, Q12H  insulin lispro, 0-9 Units, Subcutaneous, 4x Daily With Meals & Nightly  magnesium oxide, 400 mg, Oral, BID  metoprolol succinate XL, 25 mg, Oral, Daily  pantoprazole, 40 mg, Intravenous, Q12H  sodium bicarbonate, 1,950 mg, Oral, BID         sodium chloride, 30 mL/hr        Assessment/Plan   1. Acute lower GIB - colonoscopy yesterday with AVM , cauterized and clip placed  3. Mechanical aortic valve - start heparin if ok with gi  4. Chronic anticoagulation  5. Chronic kidney disease  6. Pleural effusion s/p right thoracentesis (1650 removed 10/25). Plan for repeat thoracentesis   7. History of stroke with therapeutic INR (2.2) at the time. Target increased to 3-3.5.  8. Rectal sheath hematoma, no surgical intervention.   9. Pulmonary hypertension    Heparin drip +/- warfarin when ok with GI.   Oral diuretic being restarted.   Possible repeat thoracentesis per pulm.     LIZA Krishnamurthy  [facility]  11/10/21  14:06 EST

## 2021-11-10 NOTE — PROGRESS NOTES
Name: Francisco Sequeira ADMIT: 2021   : 1935  PCP: Rubin Hinkle APRN    MRN: [id number] LOS: 8 days   AGE/SEX: 85 y.o. male  ROOM: E459/1     Subjective   Subjective    No new issues overnight.    Review of Systems   Constitutional: Positive for fatigue.   Gastrointestinal: Positive for abdominal pain, blood in stool and diarrhea.        Objective   Objective   Vital Signs  Temp:  [97.6 °F (36.4 °C)-98.5 °F (36.9 °C)] 97.8 °F (36.6 °C)  Heart Rate:  [78-95] 91  Resp:  [16-18] 16  BP: ()/(52-82) 153/79  SpO2:  [93 %-100 %] 93 %  on  Flow (L/min):  [1-4] 1;   Device (Oxygen Therapy): room air  Body mass index is 24.41 kg/m².  Physical Exam  Vitals and nursing note reviewed.   Constitutional:       General: He is not in acute distress.     Appearance: He is ill-appearing. He is not toxic-appearing or diaphoretic.   HENT:      Head: Normocephalic and atraumatic.   Eyes:      General: No scleral icterus.     Extraocular Movements: Extraocular movements intact.      Conjunctiva/sclera: Conjunctivae normal.   Cardiovascular:      Rate and Rhythm: Normal rate. Rhythm irregularly irregular.      Pulses: Normal pulses.      Comments: Mechanical s2  Pulmonary:      Effort: Pulmonary effort is normal. No respiratory distress.      Breath sounds: Normal breath sounds. No wheezing or rales.      Comments: Decreased BS right base  Abdominal:      General: Bowel sounds are normal. There is no distension.      Palpations: Abdomen is soft.      Tenderness: There is abdominal tenderness (RLQ). There is guarding (voluntary). There is no rebound.   Musculoskeletal:         General: No swelling or deformity. Normal range of motion.      Cervical back: Neck supple. No rigidity.   Lymphadenopathy:      Cervical: No cervical adenopathy.   Skin:     General: Skin is warm and dry.      Capillary Refill: Capillary refill takes less than 2 seconds.      Coloration: Skin is pale.      Findings: Bruising (Right abdomen  wall tracking superiorly down to groin and back) present.   Neurological:      Mental Status: He is alert and oriented to person, place, and time. Mental status is at baseline.      Motor: Weakness (very mild chronic left weakness, at baseline) present.   Psychiatric:         Mood and Affect: Mood normal.         Behavior: Behavior normal.       Results Review     I reviewed the patient's new clinical results.  Results from last 7 days   Lab Units 11/10/21  0618 11/09/21  0603 11/08/21  1737 11/08/21  0427 11/07/21  1351 11/07/21  0538   WBC 10*3/mm3 7.25 7.19  --  7.23  --  10.92*   HEMOGLOBIN g/dL 9.1* 8.4* 9.5* 8.4*   < > 8.5*   PLATELETS 10*3/mm3 244 219  --  195  --  182    < > = values in this interval not displayed.     Results from last 7 days   Lab Units 11/10/21  0618 11/09/21  1109 11/08/21  0427 11/07/21  0538   SODIUM mmol/L 143 145 142 144   POTASSIUM mmol/L 4.0 4.2 4.1 4.4   CHLORIDE mmol/L 111* 113* 112* 113*   CO2 mmol/L 22.6 21.5* 22.0 20.6*   BUN mg/dL 21 25* 37* 44*   CREATININE mg/dL 1.05 1.37* 1.36* 1.40*   GLUCOSE mg/dL 117* 134* 116* 124*   EGFR IF NONAFRICN AM mL/min/1.73 67 50* 50* 48*     Results from last 7 days   Lab Units 11/10/21  0618 11/09/21  1109 11/08/21  0427 11/07/21  0538 11/06/21  0550 11/05/21  1000 11/04/21  0608 11/04/21  0608   ALBUMIN g/dL 2.60* 2.60* 2.50* 2.60*   < > 2.60*   < > 2.60*   BILIRUBIN mg/dL  --   --   --   --   --  0.7  --  1.0   ALK PHOS U/L  --   --   --   --   --  79  --  76   AST (SGOT) U/L  --   --   --   --   --  31  --  19   ALT (SGPT) U/L  --   --   --   --   --  18  --  16    < > = values in this interval not displayed.     Results from last 7 days   Lab Units 11/10/21  0618 11/09/21  1109 11/08/21  0427 11/07/21  0538 11/06/21  0550 11/05/21  1000 11/04/21  0608 11/04/21  0608   CALCIUM mg/dL 8.1* 7.9* 7.7* 7.9*   < > 8.0*   < > 8.0*   ALBUMIN g/dL 2.60* 2.60* 2.50* 2.60*   < > 2.60*   < > 2.60*   MAGNESIUM mg/dL  --   --   --   --   --  2.6*  --   2.3   PHOSPHORUS mg/dL 2.8 2.9 2.9 3.0   < >  --   --   --     < > = values in this interval not displayed.     Results from last 7 days   Lab Units 11/10/21  0618 11/09/21  0603 11/08/21  0427 11/07/21  0537 11/06/21  0550 11/05/21  1000 11/04/21  0608   PROTIME Seconds 20.0* 22.5* 21.5* 22.3* 22.6* 24.9* 26.3*   INR  1.73* 2.00* 1.89* 1.98* 2.02* 2.28* 2.45*     Glucose   Date/Time Value Ref Range Status   11/10/2021 1124 130 70 - 130 mg/dL Final     Comment:     Meter: [id number] : [id number] Michele Salgado NA   11/10/2021 0559 119 70 - 130 mg/dL Final     Comment:     Meter: [id number] : [id number] Cutler Malini NA   11/09/2021 2103 117 70 - 130 mg/dL Final     Comment:     Meter: [id number] : [id number] Cutler Malini NA   11/09/2021 1847 125 70 - 130 mg/dL Final     Comment:     Meter: [id number] : [id number] Dracut Ember    11/09/2021 1105 134 (H) 70 - 130 mg/dL Final     Comment:     Meter: [id number] : [id number] Dracut Ember NA   11/09/2021 0606 132 (H) 70 - 130 mg/dL Final     Comment:     Meter: [id number] : [id number] León Lopez NA   11/08/2021 2027 162 (H) 70 - 130 mg/dL Final     Comment:     Meter: [id number] : [id number] Bishop Nori BURKS       COLONOSCOPY  11/9  Impression:  - Preparation of the colon was poor.  - Diverticulosis in the sigmoid colon, in the descending colon and in the ascending colon.  - A single colonic angiodysplastic lesion. Treated with argon plasma coagulation (APC). Clip was placed.  - The examination was otherwise normal on direct and retroflexion views.  - No specimens collected.    CT Abdomen Pelvis Without Contrast  11/4  CONCLUSION:  1. Right-sided pleural effusion appears slightly larger compared to the  previous examination, new small left-sided pleural effusion has  developed. There is diffuse body wall edema and cardiac enlargement.  Small amount of free fluid is demonstrated within the peritoneal cavity.  2. Sizable bilobed collection has  developed within the right rectus  abdominis muscle, consistent with hematoma. See above dimensions.  3. Diverticulosis of the colon, subtle pericolonic induration about the  mid sigmoid suggests possible diverticulitis.  4. Urine demonstrates increased density, perhaps related to residual  contrast from previous CT or hemorrhage. Correlation with urinalysis  advised.  5. Gallstones.        Scheduled Medications  atorvastatin, 40 mg, Oral, Daily  dextrose, 25 g, Intravenous, Once  digoxin, 125 mcg, Oral, Daily  ferrous sulfate, 325 mg, Oral, Daily With Breakfast  guaiFENesin, 600 mg, Oral, Q12H  insulin lispro, 0-9 Units, Subcutaneous, 4x Daily With Meals & Nightly  magnesium oxide, 400 mg, Oral, BID  metoprolol succinate XL, 25 mg, Oral, Daily  pantoprazole, 40 mg, Intravenous, Q12H  sodium bicarbonate, 1,950 mg, Oral, BID    Infusions  sodium chloride, 30 mL/hr    Diet  Diet Full Liquid       Assessment/Plan     Active Hospital Problems    Diagnosis  POA   • **Lower GI bleed [K92.2]  Yes   • Angiodysplasia of colon without hemorrhage [K55.20]  Unknown   • Rectus sheath hematoma [S30.1XXA]  No   • Acute posthemorrhagic anemia [D62]  Yes   • History of nephrectomy [Z90.5]  Not Applicable   • Abnormal urinalysis [R82.90]  Yes   • Recurrent right pleural effusion [J90]  Yes   • 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       84 yo gentleman with h/o DM2, AFib, Mech AVR (on warfarin), HTN,  "CKD3, h/o RCC s/p right nephrectomy and h/o CVA with mild residual left hemiparesis, admitted here from 10/23 to 10/30 for \"obscure\" GI bleed and bilateral pleural effusions (transudate), who presented to ER with c/o rectal bleeding and generalized weakness. Found to have drop in Hgb from 9.3 to 7.2. Stool grossly bloody in ER.     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  Hemoglobin has been relatively stable and will change to daily monitoring for now.  Repeat chest x-ray 11/8 by pulmonology shows moderately large right pleural effusion.  They were considering possible thoracentesis.  Will reach out to them to discuss.  Continue to hold warfarin for now.  Speech therapy has evaluated him and had recommended VFSS when GI issues resolved.    BS relatively stable on SSI.      SCDs for DVT prophylaxis.  Full code.  Discussed with patient, family, nursing staff and consulting provider.  Anticipate discharge home with HH vs SNU facility timing yet to be determined.      Santos Guzman MD  [facility]  11/10/21  12:17 EST          "

## 2021-11-10 NOTE — PROGRESS NOTES
[facility] Consult Note          PATIENT IDENTIFICATION:   Name:  Francisco Sequeira      MRN:  [id number]     85 y.o.  male               SUBJECTIVE:   Is been doing well.  Had colonoscopy yesterday with no reported issues.  AVM was cauterized.  Oral intake has been good.    OBJECTIVE:  Vitals:    11/09/21 1912 11/09/21 2300 11/10/21 0729 11/10/21 0813   BP: 141/77 138/68 153/79    BP Location: Right arm Right arm Right arm    Patient Position: Lying Lying Lying    Pulse: 84 95 86 91   Resp: 18 16 16    Temp: 97.7 °F (36.5 °C) 97.6 °F (36.4 °C) 97.8 °F (36.6 °C)    TempSrc: Oral Oral Oral    SpO2:  98% 93%    Weight:       Height:               Body mass index is 24.41 kg/m².    Intake/Output Summary (Last 24 hours) at 11/10/2021 1217  Last data filed at 11/10/2021 0729  Gross per 24 hour   Intake 2442 ml   Output 600 ml   Net 1842 ml     Wt Readings from Last 1 Encounters:   11/02/21 0344 81.6 kg (180 lb)     Wt Readings from Last 3 Encounters:   11/02/21 81.6 kg (180 lb)   10/24/21 85.3 kg (188 lb)   07/30/21 86.2 kg (190 lb)       Physical Exam:  NAD; pleasant; partially oriented   Pale; looks stated age  MMM; AT/NC   No eye discharge; no scleral icterus  No JVD; no carotid bruits  Decreased breath sounds in bases bilat; not labored on RA  Irregularly irregular, metal click, 2/6M  Firm, +T but no G or R, +D, BS+  Trace LE edema  No clubbing  No asterixis  Moves all extremities   Mood and affect are normal    Scheduled meds:    atorvastatin, 40 mg, Oral, Daily  dextrose, 25 g, Intravenous, Once  digoxin, 125 mcg, Oral, Daily  ferrous sulfate, 325 mg, Oral, Daily With Breakfast  guaiFENesin, 600 mg, Oral, Q12H  insulin lispro, 0-9 Units, Subcutaneous, 4x Daily With Meals & Nightly  magnesium oxide, 400 mg, Oral, BID  metoprolol succinate XL, 25 mg, Oral, Daily  pantoprazole, 40 mg, Intravenous, Q12H  sodium bicarbonate, 1,950 mg, Oral, BID      IV meds:                        sodium  chloride, 30 mL/hr        Data Review:    Results from last 7 days   Lab Units 11/10/21  0618 11/09/21  1109 11/08/21  0427 11/06/21  0550 11/05/21  1000 11/04/21  0608 11/04/21  0608   SODIUM mmol/L 143 145 142   < > 144   < > 141   POTASSIUM mmol/L 4.0 4.2 4.1   < > 5.0   < > 5.0   CHLORIDE mmol/L 111* 113* 112*   < > 111*   < > 109*   CO2 mmol/L 22.6 21.5* 22.0   < > 20.5*   < > 24.3   BUN mg/dL 21 25* 37*   < > 49*   < > 40*   CREATININE mg/dL 1.05 1.37* 1.36*   < > 2.06*   < > 1.89*   CALCIUM mg/dL 8.1* 7.9* 7.7*   < > 8.0*   < > 8.0*   BILIRUBIN mg/dL  --   --   --   --  0.7  --  1.0   ALK PHOS U/L  --   --   --   --  79  --  76   ALT (SGPT) U/L  --   --   --   --  18  --  16   AST (SGOT) U/L  --   --   --   --  31  --  19   GLUCOSE mg/dL 117* 134* 116*   < > 184*   < > 151*    < > = values in this interval not displayed.     Estimated Creatinine Clearance: 61.5 mL/min (by C-G formula based on SCr of 1.05 mg/dL).  Results from last 7 days   Lab Units 11/05/21  1808   SODIUM UR mmol/L 26   CREATININE UR mg/dL 91.0     Results from last 7 days   Lab Units 11/10/21  0618 11/09/21  1109 11/08/21  0427 11/06/21  0550 11/05/21  1000 11/04/21  0608   MAGNESIUM mg/dL  --   --   --   --  2.6* 2.3   PHOSPHORUS mg/dL 2.8 2.9 2.9   < >  --   --     < > = values in this interval not displayed.       Results from last 7 days   Lab Units 11/10/21  0618 11/09/21  0603 11/08/21  1737 11/08/21 0427 11/07/21  2151 11/07/21  1351 11/07/21  0538 11/06/21  1805 11/06/21  0550   WBC 10*3/mm3 7.25 7.19  --  7.23  --   --  10.92*  --  12.45*   HEMOGLOBIN g/dL 9.1* 8.4* 9.5* 8.4* 8.2*   < > 8.5*   < > 6.9*   PLATELETS 10*3/mm3 244 219  --  195  --   --  182  --  145    < > = values in this interval not displayed.       Results from last 7 days   Lab Units 11/10/21  0618 11/09/21  0603 11/08/21  0427 11/07/21  0537 11/06/21  0550   INR  1.73* 2.00* 1.89* 1.98* 2.02*             ASSESSMENT:     Lower GI bleed    Atrial fibrillation  (HCC)    Hypertension    Uncontrolled type 2 diabetes mellitus with complication, with long-term current use of insulin (HCC)    Hx of aortic valve replacement, mechanical [Z95.2]    Stage 3b chronic kidney disease (HCC)    Chronic anticoagulation    History of nephrectomy    Abnormal urinalysis    Recurrent right pleural effusion    Hemiparesis of left nondominant side as late effect of cerebral infarction (HCC)    Rectus sheath hematoma    Acute posthemorrhagic anemia    Angiodysplasia of colon without hemorrhage    1. BYRON on CKD3, non-oliguric, improved  Etiology is secondary to prerenal state/ATN  due to hypovolemia, blood loss, hypotension, and recent contrast exposure on 11/2/21.  Bladder scans are normal.    2. Acute GI bleed with bloody stools noted this admission and tagged RBC scan showing bleed in the RLQ consistent with distal ileum or proximal colon.  Colonoscopy performed with AVM that was cauterized awaiting GI clearance for resuming anticoagulation   3. acute blood loss anemia related to GI bleed. He has received 6 units PRBCs this admission.  Hemoglobin has been stable  4. History of mechanical AVR and Atrial Fibrillation anticoagulated with warfarin. His warfarin is being held and IV heparin drip infusing  5. Hypertension, controlled  6. Recurrent right pleural effusion; transudate per last thoracentesis. Repeat thoracentesis planned when more stable. Home lasix of 40 mg daily is being held.   7. Type II diabetes. Hgb A1c 5.2 last admission.  8. History of CVA with left side hemiparesis.  9. Mild hypophosphatemia.  Likely secondary to decreased oral intake.  No indication for replacement this time.  Awaiting today's lab        PLAN:  1. Agree with stopping IV fluids.   2. May resume home dose of Lasix 40 mg daily  3. Plan noted for possible thoracentesis  4. Surveillance labs    Laz Fernandes MD  11/10/2021  12:17 EST

## 2021-11-10 NOTE — PLAN OF CARE
Problem: Adult Inpatient Plan of Care  Goal: Plan of Care Review  Outcome: Ongoing, Progressing  Flowsheets (Taken 11/10/2021 1700)  Progress: improving  Plan of Care Reviewed With: patient  Outcome Summary: VSS. No c/o pain. Thoracentesis to be done in AM. Wife at bedside. Will continue to monitor.

## 2021-11-11 ENCOUNTER — APPOINTMENT (OUTPATIENT)
Dept: GENERAL RADIOLOGY | Facility: HOSPITAL | Age: 86
End: 2021-11-11

## 2021-11-11 ENCOUNTER — APPOINTMENT (OUTPATIENT)
Dept: ULTRASOUND IMAGING | Facility: HOSPITAL | Age: 86
End: 2021-11-11

## 2021-11-11 LAB
ALBUMIN SERPL-MCNC: 2.6 G/DL (ref 3.5–5.2)
AMYLASE FLD-CCNC: 21 U/L
ANION GAP SERPL CALCULATED.3IONS-SCNC: 9.5 MMOL/L (ref 5–15)
APPEARANCE FLD: ABNORMAL
APTT PPP: 44.7 SECONDS (ref 22.7–35.4)
APTT PPP: 95.1 SECONDS (ref 22.7–35.4)
BUN SERPL-MCNC: 20 MG/DL (ref 8–23)
BUN/CREAT SERPL: 16.7 (ref 7–25)
CALCIUM SPEC-SCNC: 8.2 MG/DL (ref 8.6–10.5)
CHLORIDE SERPL-SCNC: 107 MMOL/L (ref 98–107)
CO2 SERPL-SCNC: 23.5 MMOL/L (ref 22–29)
COLOR FLD: YELLOW
CREAT SERPL-MCNC: 1.2 MG/DL (ref 0.76–1.27)
DEPRECATED RDW RBC AUTO: 60.3 FL (ref 37–54)
EOSINOPHIL NFR FLD MANUAL: 2 %
ERYTHROCYTE [DISTWIDTH] IN BLOOD BY AUTOMATED COUNT: 20.4 % (ref 12.3–15.4)
GFR SERPL CREATININE-BSD FRML MDRD: 58 ML/MIN/1.73
GLUCOSE BLDC GLUCOMTR-MCNC: 133 MG/DL (ref 70–130)
GLUCOSE BLDC GLUCOMTR-MCNC: 141 MG/DL (ref 70–130)
GLUCOSE BLDC GLUCOMTR-MCNC: 165 MG/DL (ref 70–130)
GLUCOSE BLDC GLUCOMTR-MCNC: 189 MG/DL (ref 70–130)
GLUCOSE FLD-MCNC: 150 MG/DL
GLUCOSE SERPL-MCNC: 140 MG/DL (ref 65–99)
HCT VFR BLD AUTO: 26.5 % (ref 37.5–51)
HGB BLD-MCNC: 8.6 G/DL (ref 13–17.7)
INR PPP: 1.57 (ref 0.9–1.1)
LDH FLD-CCNC: 80 U/L
LDH SERPL-CCNC: 233 U/L (ref 135–225)
LYMPHOCYTES NFR FLD MANUAL: 36 %
MCH RBC QN AUTO: 26.4 PG (ref 26.6–33)
MCHC RBC AUTO-ENTMCNC: 32.5 G/DL (ref 31.5–35.7)
MCV RBC AUTO: 81.3 FL (ref 79–97)
METHOD: ABNORMAL
MONOCYTES NFR FLD: 19 %
MONOS+MACROS NFR FLD: 3 %
NEUTROPHILS NFR FLD MANUAL: 40 %
NUC CELL # FLD: 47 /MM3
PH FLD: 7.57 [PH]
PHOSPHATE SERPL-MCNC: 2.5 MG/DL (ref 2.5–4.5)
PLATELET # BLD AUTO: 234 10*3/MM3 (ref 140–450)
PMV BLD AUTO: 11 FL (ref 6–12)
POTASSIUM SERPL-SCNC: 3.6 MMOL/L (ref 3.5–5.2)
PROT FLD-MCNC: 1.8 G/DL
PROT SERPL-MCNC: 5.8 G/DL (ref 6–8.5)
PROTHROMBIN TIME: 18.6 SECONDS (ref 11.7–14.2)
RBC # BLD AUTO: 3.26 10*6/MM3 (ref 4.14–5.8)
RBC # FLD AUTO: 1078 /MM3
SODIUM SERPL-SCNC: 140 MMOL/L (ref 136–145)
WBC # BLD AUTO: 6.08 10*3/MM3 (ref 3.4–10.8)

## 2021-11-11 PROCEDURE — 87070 CULTURE OTHR SPECIMN AEROBIC: CPT | Performed by: INTERNAL MEDICINE

## 2021-11-11 PROCEDURE — 87116 MYCOBACTERIA CULTURE: CPT | Performed by: INTERNAL MEDICINE

## 2021-11-11 PROCEDURE — 71046 X-RAY EXAM CHEST 2 VIEWS: CPT

## 2021-11-11 PROCEDURE — 84155 ASSAY OF PROTEIN SERUM: CPT | Performed by: INTERNAL MEDICINE

## 2021-11-11 PROCEDURE — 84157 ASSAY OF PROTEIN OTHER: CPT | Performed by: INTERNAL MEDICINE

## 2021-11-11 PROCEDURE — 36415 COLL VENOUS BLD VENIPUNCTURE: CPT | Performed by: INTERNAL MEDICINE

## 2021-11-11 PROCEDURE — 87206 SMEAR FLUORESCENT/ACID STAI: CPT | Performed by: INTERNAL MEDICINE

## 2021-11-11 PROCEDURE — 85027 COMPLETE CBC AUTOMATED: CPT | Performed by: INTERNAL MEDICINE

## 2021-11-11 PROCEDURE — 82150 ASSAY OF AMYLASE: CPT | Performed by: INTERNAL MEDICINE

## 2021-11-11 PROCEDURE — 25010000002 HEPARIN (PORCINE) PER 1000 UNITS: Performed by: NURSE PRACTITIONER

## 2021-11-11 PROCEDURE — 92526 ORAL FUNCTION THERAPY: CPT

## 2021-11-11 PROCEDURE — 99232 SBSQ HOSP IP/OBS MODERATE 35: CPT | Performed by: NURSE PRACTITIONER

## 2021-11-11 PROCEDURE — 80069 RENAL FUNCTION PANEL: CPT | Performed by: INTERNAL MEDICINE

## 2021-11-11 PROCEDURE — 63710000001 INSULIN LISPRO (HUMAN) PER 5 UNITS: Performed by: INTERNAL MEDICINE

## 2021-11-11 PROCEDURE — 88305 TISSUE EXAM BY PATHOLOGIST: CPT | Performed by: INTERNAL MEDICINE

## 2021-11-11 PROCEDURE — 85730 THROMBOPLASTIN TIME PARTIAL: CPT | Performed by: HOSPITALIST

## 2021-11-11 PROCEDURE — 83615 LACTATE (LD) (LDH) ENZYME: CPT | Performed by: INTERNAL MEDICINE

## 2021-11-11 PROCEDURE — 82962 GLUCOSE BLOOD TEST: CPT

## 2021-11-11 PROCEDURE — 87015 SPECIMEN INFECT AGNT CONCNTJ: CPT | Performed by: INTERNAL MEDICINE

## 2021-11-11 PROCEDURE — 92610 EVALUATE SWALLOWING FUNCTION: CPT

## 2021-11-11 PROCEDURE — 85610 PROTHROMBIN TIME: CPT | Performed by: INTERNAL MEDICINE

## 2021-11-11 PROCEDURE — 0 LIDOCAINE 1 % SOLUTION: Performed by: RADIOLOGY

## 2021-11-11 PROCEDURE — 87205 SMEAR GRAM STAIN: CPT | Performed by: INTERNAL MEDICINE

## 2021-11-11 PROCEDURE — 89051 BODY FLUID CELL COUNT: CPT | Performed by: INTERNAL MEDICINE

## 2021-11-11 PROCEDURE — 88112 CYTOPATH CELL ENHANCE TECH: CPT | Performed by: INTERNAL MEDICINE

## 2021-11-11 PROCEDURE — 82945 GLUCOSE OTHER FLUID: CPT | Performed by: INTERNAL MEDICINE

## 2021-11-11 PROCEDURE — 76942 ECHO GUIDE FOR BIOPSY: CPT

## 2021-11-11 PROCEDURE — 85730 THROMBOPLASTIN TIME PARTIAL: CPT | Performed by: NURSE PRACTITIONER

## 2021-11-11 PROCEDURE — 99232 SBSQ HOSP IP/OBS MODERATE 35: CPT | Performed by: INTERNAL MEDICINE

## 2021-11-11 PROCEDURE — 83986 ASSAY PH BODY FLUID NOS: CPT | Performed by: INTERNAL MEDICINE

## 2021-11-11 RX ORDER — HEPARIN SODIUM 10000 [USP'U]/100ML
12 INJECTION, SOLUTION INTRAVENOUS
Status: DISCONTINUED | OUTPATIENT
Start: 2021-11-11 | End: 2021-11-16 | Stop reason: HOSPADM

## 2021-11-11 RX ORDER — LIDOCAINE HYDROCHLORIDE 10 MG/ML
10 INJECTION, SOLUTION INFILTRATION; PERINEURAL ONCE
Status: COMPLETED | OUTPATIENT
Start: 2021-11-11 | End: 2021-11-11

## 2021-11-11 RX ADMIN — GUAIFENESIN 600 MG: 600 TABLET, EXTENDED RELEASE ORAL at 20:43

## 2021-11-11 RX ADMIN — MAGNESIUM OXIDE 400 MG (241.3 MG MAGNESIUM) TABLET 400 MG: TABLET at 20:43

## 2021-11-11 RX ADMIN — DIGOXIN 125 MCG: 250 TABLET ORAL at 14:33

## 2021-11-11 RX ADMIN — FERROUS SULFATE TAB 325 MG (65 MG ELEMENTAL FE) 325 MG: 325 (65 FE) TAB at 14:32

## 2021-11-11 RX ADMIN — FUROSEMIDE 40 MG: 40 TABLET ORAL at 14:33

## 2021-11-11 RX ADMIN — LIDOCAINE HYDROCHLORIDE 6 ML: 10 INJECTION, SOLUTION INFILTRATION; PERINEURAL at 09:15

## 2021-11-11 RX ADMIN — INSULIN LISPRO 2 UNITS: 100 INJECTION, SOLUTION INTRAVENOUS; SUBCUTANEOUS at 17:31

## 2021-11-11 RX ADMIN — HEPARIN SODIUM 12 UNITS/KG/HR: 10000 INJECTION, SOLUTION INTRAVENOUS at 13:25

## 2021-11-11 RX ADMIN — INSULIN LISPRO 2 UNITS: 100 INJECTION, SOLUTION INTRAVENOUS; SUBCUTANEOUS at 20:42

## 2021-11-11 RX ADMIN — SODIUM BICARBONATE 1950 MG: 650 TABLET ORAL at 20:42

## 2021-11-11 RX ADMIN — GUAIFENESIN 600 MG: 600 TABLET, EXTENDED RELEASE ORAL at 14:33

## 2021-11-11 RX ADMIN — SODIUM BICARBONATE 1950 MG: 650 TABLET ORAL at 14:32

## 2021-11-11 RX ADMIN — PANTOPRAZOLE SODIUM 40 MG: 40 INJECTION, POWDER, FOR SOLUTION INTRAVENOUS at 14:32

## 2021-11-11 RX ADMIN — METOPROLOL SUCCINATE 25 MG: 25 TABLET, EXTENDED RELEASE ORAL at 14:33

## 2021-11-11 RX ADMIN — MAGNESIUM OXIDE 400 MG (241.3 MG MAGNESIUM) TABLET 400 MG: TABLET at 14:32

## 2021-11-11 RX ADMIN — ATORVASTATIN CALCIUM 40 MG: 20 TABLET, FILM COATED ORAL at 14:33

## 2021-11-11 RX ADMIN — PANTOPRAZOLE SODIUM 40 MG: 40 INJECTION, POWDER, FOR SOLUTION INTRAVENOUS at 20:43

## 2021-11-11 NOTE — PROGRESS NOTES
[facility]  Inpatient Progress Note    Reason for Follow Up:  Overt GI bleeding while on coumadin    Subjective     Interval History:   The patient's wife does not think that he has had any more rectal bleeding.  His hemoglobin is 8.6 today and was 9.1 yesterday.    Current Facility-Administered Medications:   •  acetaminophen (TYLENOL) tablet 650 mg, 650 mg, Oral, Q4H PRN **OR** [DISCONTINUED] acetaminophen (TYLENOL) 160 MG/5ML solution 650 mg, 650 mg, Oral, Q4H PRN **OR** [DISCONTINUED] acetaminophen (TYLENOL) suppository 650 mg, 650 mg, Rectal, Q4H PRN, Carmen Welch, LIZA  •  atorvastatin (LIPITOR) tablet 40 mg, 40 mg, Oral, Daily, Pavan Rodriguez MD, 40 mg at 11/10/21 0813  •  dextrose (D50W) (25 g/50 mL) IV injection 25 g, 25 g, Intravenous, Q15 Min PRN, Pavan Rodriguez MD  •  dextrose (D50W) (25 g/50 mL) IV injection 25 g, 25 g, Intravenous, Once, Pavan Rodriguez MD  •  dextrose (GLUTOSE) oral gel 15 g, 15 g, Oral, Q15 Min PRN, Pavan Rodriguez MD  •  digoxin (LANOXIN) tablet 125 mcg, 125 mcg, Oral, Daily, Pavan Rodriguez MD, 125 mcg at 11/10/21 0813  •  diphenhydrAMINE (BENADRYL) capsule 25 mg, 25 mg, Oral, BID PRN, Pavan Rodriguez MD  •  ferrous sulfate tablet 325 mg, 325 mg, Oral, Daily With Breakfast, Pavan Rodriguez MD, 325 mg at 11/10/21 0813  •  furosemide (LASIX) tablet 40 mg, 40 mg, Oral, Daily, Laz Fernandes MD, 40 mg at 11/10/21 1448  •  glucagon (human recombinant) (GLUCAGEN DIAGNOSTIC) injection 1 mg, 1 mg, Subcutaneous, PRN, Pavan Rodriguez MD  •  guaiFENesin (MUCINEX) 12 hr tablet 600 mg, 600 mg, Oral, Q12H, Pavan Rodriguez MD, 600 mg at 11/10/21 2129  •  Hold medication, 1 each, Does not apply, Continuous PRN, Lebron Cano MD  •  HYDROcodone-acetaminophen (NORCO) 5-325 MG per tablet 1 tablet, 1 tablet, Oral, Q6H PRN, Pavan Rodriguez MD, 1 tablet at 11/02/21 1704  •  insulin lispro (ADMELOG)  injection 0-9 Units, 0-9 Units, Subcutaneous, 4x Daily With Meals & Nightly, Pavan Rodriguez MD, 2 Units at 11/08/21 1749  •  magnesium oxide (MAG-OX) tablet 400 mg, 400 mg, Oral, BID, Pavan Rodriguez MD, 400 mg at 11/10/21 2130  •  metoprolol succinate XL (TOPROL-XL) 24 hr tablet 25 mg, 25 mg, Oral, Daily, Pavan Rodriguez MD, 25 mg at 11/10/21 0814  •  nitroglycerin (NITROSTAT) SL tablet 0.4 mg, 0.4 mg, Sublingual, Q5 Min PRN, Pavan Rodriguez MD  •  ondansetron (ZOFRAN) tablet 4 mg, 4 mg, Oral, Q6H PRN **OR** ondansetron (ZOFRAN) injection 4 mg, 4 mg, Intravenous, Q6H PRN, Pavan Rodriguez MD, 4 mg at 11/04/21 0008  •  pantoprazole (PROTONIX) injection 40 mg, 40 mg, Intravenous, Q12H, Pavan Rodriguez MD, 40 mg at 11/10/21 2130  •  sodium bicarbonate tablet 1,950 mg, 1,950 mg, Oral, BID, Pavan Rodriguez MD, 1,950 mg at 11/10/21 2130  •  sodium chloride 0.9 % infusion, 30 mL/hr, Intravenous, Continuous PRN, Pavan Rodriguez MD, New Bag at 11/09/21 1727  Review of Systems:    Review of systems could not be obtained due to  patient confusion.    Objective     Vital Signs  Temp:  [97.7 °F (36.5 °C)-99.1 °F (37.3 °C)] 97.7 °F (36.5 °C)  Heart Rate:  [] 84  Resp:  [16-22] 16  BP: (134-152)/(52-80) 152/64  Body mass index is 24.41 kg/m².    Intake/Output Summary (Last 24 hours) at 11/11/2021 0903  Last data filed at 11/10/2021 0980  Gross per 24 hour   Intake 0 ml   Output 225 ml   Net -225 ml     No intake/output data recorded.     Physical Exam:   General: patient awake, alert and cooperative   Eyes: Normal lids and lashes, no scleral icterus   Neck: supple, normal ROM   Skin: warm and dry, not jaundiced   Cardiovascular: regular rhythm and rate, no murmurs auscultated   Pulm: clear to auscultation bilaterally, regular and unlabored   Abdomen: soft, nontender, nondistended; normal bowel sounds   Extremities: no rash or edema   Psychiatric: Normal mood and behavior; memory  intact     Results Review:     I reviewed the patient's new clinical results.    Results from last 7 days   Lab Units 11/11/21  0512 11/10/21  0618 11/09/21  0603   WBC 10*3/mm3 6.08 7.25 7.19   HEMOGLOBIN g/dL 8.6* 9.1* 8.4*   HEMATOCRIT % 26.5* 30.0* 26.3*   PLATELETS 10*3/mm3 234 244 219     Results from last 7 days   Lab Units 11/11/21  0512 11/10/21  0618 11/09/21  1109 11/06/21  0550 11/05/21  1000   SODIUM mmol/L 140 143 145   < > 144   POTASSIUM mmol/L 3.6 4.0 4.2   < > 5.0   CHLORIDE mmol/L 107 111* 113*   < > 111*   CO2 mmol/L 23.5 22.6 21.5*   < > 20.5*   BUN mg/dL 20 21 25*   < > 49*   CREATININE mg/dL 1.20 1.05 1.37*   < > 2.06*   CALCIUM mg/dL 8.2* 8.1* 7.9*   < > 8.0*   BILIRUBIN mg/dL  --   --   --   --  0.7   ALK PHOS U/L  --   --   --   --  79   ALT (SGPT) U/L  --   --   --   --  18   AST (SGOT) U/L  --   --   --   --  31   GLUCOSE mg/dL 140* 117* 134*   < > 184*    < > = values in this interval not displayed.     Results from last 7 days   Lab Units 11/11/21  0512 11/10/21  0618 11/09/21  0603   INR  1.57* 1.73* 2.00*     No results found for: LIPASE    Radiology:  XR Chest 1 View   Final Result      CT Abdomen Pelvis Without Contrast   Final Result      XR Chest 1 View   Final Result   Moderate right pleural effusion appears decreased from the   prior exam. Atelectasis/infiltrate is apparent in the right lower lung.   Continued follow-up recommended.         This report was finalized on 11/2/2021 4:34 PM by Dr. Chava Luke M.D.          NM GI Blood Loss   Final Result   Active GI bleeding from the right lower quadrant in the   region of either the distal ileum or the proximal ascending colon. I   discussed the case with Dr. Al Gerard at around 3:45 PM on the   day of the exam.       This report was finalized on 11/2/2021 5:51 PM by Dr. Grant Jorge M.D.          CT Abdomen Pelvis With Contrast   Final Result       1. No obvious etiology for the patient's GI bleeding is  identified.   Patient does have colonic diverticulosis. There is no diverticulitis.   2. Large right pleural effusion. This may be slightly smaller than on   prior study. Previously identified left pleural effusion has almost   completely resolved.       Radiation dose reduction techniques were utilized, including automated   exposure control and exposure modulation based on body size.       This report was finalized on 11/2/2021 5:38 AM by Dr. Yue Frye M.D.          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 GI bleed with melena and bright red rectal bleeding, resolved. He tolerated a colonoscopy 11/9 when an AVM was found and treated in the ascending colon.   2. Abnormal tagged red blood cell scan positive with activity in the cecum/terminal ileum  3. Acute blood loss anemia, stable  4. Anticoagulated on Coumadin, currently held  5. History of mechanical aortic valve  6. Acute kidney injury on chronic kidney  disease 3     Plan:  -From a GI standpoint it is okay to restart the heparin drip and we will pray that he does not bleed.  -Continue serial H&H's and transfuse as necessary.     I discussed the patients findings and my recommendations with patient and family.    Oniel Moon MD

## 2021-11-11 NOTE — PLAN OF CARE
Goal Outcome Evaluation:  Plan of Care Reviewed With: patient, spouse           Outcome Summary: Re-evaluation completed during trials of ice chips, single drinks of thin via cup, and puree. Oral phase appeared functional for limited consistencies. Pt noted to swallow 3-4 times with each trial of thin liquids (some swallows appeared piecemeal and then consecutive swallows). Each swallow of puree and thin liquids was audible suggesting possible impaired pharyngeal contraction and pressure issues. Pt also noted to burp after each swallow suggesting possible esophageal issues. Pt exhibited no overt s/s of penetration/aspiration during consistencies assesed. Recommend to continue full liquid diet. Would recommend VFSS to furer evaluate swallow if pt allowed to have barium now. If not, would recommend FEES. Discussed with pt's wife and pt and in agreement. RN to check to see of pt able to have barium. Meds whole or crushed in puree at this time.

## 2021-11-11 NOTE — PLAN OF CARE
Goal Outcome Evaluation:  Plan of Care Reviewed With: patient      Progress: no change  Outcome Summary: VSS. No C/o pain. Thoracentesis scheduled for today. No acute events overnight. Will continue to monitor.

## 2021-11-11 NOTE — PROGRESS NOTES
Chart reviewed and patient underwent a thoracentesis this morning with removal of 1.6 L of fluid uneventfully.  Patient's wife at bedside and states that he did feel better and is currently downstairs to get a repeat chest x-ray.  We will reassess in the a.m.  Please notify us of any acute issues

## 2021-11-11 NOTE — THERAPY RE-EVALUATION
Acute Care - Speech Language Pathology   Swallow Re-Evaluation [facility]     Patient Name: Francisco Sequeira  : 1935  MRN: [id number]  Today's Date: 2021               Admit Date: 2021    Visit Dx:     ICD-10-CM ICD-9-CM   1. Lower GI bleed  K92.2 578.9   2. Hypoglycemia  E16.2 251.2   3. On warfarin therapy  Z79.01 V58.61   4. 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)   • Iron deficiency anemia   • Chronic anticoagulation   • Lower GI bleed   • History of nephrectomy   • Abnormal urinalysis   • Recurrent right pleural effusion   • Hemiparesis of left nondominant side as late effect of cerebral infarction (HCC)   • Rectus sheath hematoma   • Acute posthemorrhagic anemia   • Angiodysplasia of colon without hemorrhage     Past Medical History:   Diagnosis Date   • Allergic rhinitis    • Aortic valve insufficiency    • Ascending aortic aneurysm (HCC)    • Atrial fibrillation (HCC)    • Bacteremia    • Calcific aortic stenosis of bicuspid valve    • Cardiac arrest (HCC)    • Cardiomyopathy (HCC)    • CKD (chronic kidney disease) stage 3, GFR 30-59 ml/min (HCC)    • Contact dermatitis due to poison ivy    • Elbow fracture    • Esophageal reflux    • GERD (gastroesophageal reflux disease)    • Head injury    • History of transfusion    • Hyperglycemia    • Hyperlipidemia    • Hypertension    • Kidney carcinoma (HCC)    • Nonischemic  cardiomyopathy (HCC)    • Renal oncocytoma    • Seasonal allergic reaction    • Sinusitis    • Syncope    • Type 2 diabetes mellitus (HCC)     uncontrolled   • Visual field defect      Past Surgical History:   Procedure Laterality Date   • AORTIC VALVE REPAIR/REPLACEMENT     • ASCENDING AORTIC ANEURYSM REPAIR W/ MECHANICAL AORTIC VALVE REPLACEMENT     • COLONOSCOPY N/A 10/28/2021    Procedure: COLONOSCOPY to cecum:  cold snare polyps,;  Surgeon: Dinesh Meza MD;  Location:  [facility];  Service: Gastroenterology;  Laterality: N/A;  pre:  Iron deficiency anemia  post:  polyps, diverticulosis,    • COLONOSCOPY N/A 11/9/2021    Procedure: COLONOSCOPY to cecum with APC cautery of AVM and clip placement x1;  Surgeon: Pavan Rodriguez MD;  Location:  [facility];  Service: Gastroenterology;  Laterality: N/A;  PRE - gi bleed, anemia  POST - diverticulosis, poor prep, AVM right colon   • ENDOSCOPY N/A 10/28/2021    Procedure: ESOPHAGOGASTRODUODENOSCOPY with biopsies;  Surgeon: Dinesh Meza MD;  [facility];  Service: Gastroenterology;  Laterality: N/A;  pre:  Iron deficiency anemia  post:  duodenitis and gastritis   • EYE SURGERY  12/09/2020    cataract surgery    • NEPHRECTOMY     • OTHER SURGICAL HISTORY      elbow surgery   • OTHER SURGICAL HISTORY      right arm surgery   • PROSTATE SURGERY     • THORACENTESIS Left     diagnostic       SLP Recommendation and Plan  SLP Swallowing Diagnosis: suspected pharyngeal dysphagia, esophageal dysphagia (11/11/21 1400)  SLP Diet Recommendation: full liquid diet (per team) (11/11/21 1400)  Recommended Precautions and Strategies: upright posture during/after eating, small bites of food and sips of liquid, no straw (11/11/21 1400)  SLP Rec. for Method of Medication Administration: meds crushed, with pudding or applesauce (11/11/21 1400)     Monitor for Signs of Aspiration: yes, notify SLP if any concerns, other (see comments) (11/11/21  1400)  Recommended Diagnostics: VFSS ([facility]), FEES (11/11/21 1400)  Swallow Criteria for Skilled Therapeutic Interventions Met: demonstrates skilled criteria (11/11/21 1400)     Rehab Potential/Prognosis, Swallowing: good, to achieve stated therapy goals (11/11/21 1400)  Therapy Frequency (Swallow): PRN (11/11/21 1400)  Predicted Duration Therapy Intervention (Days): until discharge (11/11/21 1400)                         Plan of Care Reviewed With: patient, spouse  Outcome Summary: Re-evaluation completed during trials of ice chips, single drinks of thin via cup, and puree. Oral phase appeared functional for limited consistencies. Pt noted to swallow 3-4 times with each trial of thin liquids (some swallows appeared piecemeal and then consecutive swallows). Each swallow of puree and thin liquids was audible suggesting possible impaired pharyngeal contraction and pressure issues. Pt also noted to burp after each swallow suggesting possible esophageal issues. Pt exhibited no overt s/s of penetration/aspiration during consistencies assesed. Recommend to continue full liquid diet. Would recommend VFSS to furhter evaluate swallow if pt allowed to have barium now. If not, would recommend FEES. Discussed with pt's wife and pt and in agreement. RN to check to see of pt able to have barium. Meds whole or crushed in puree at this time.      SWALLOW EVALUATION (last 72 hours)     SLP Adult Swallow Evaluation     Row Name 11/11/21 1400          Subjective Information no complaints  -ML    Patient Observations alert; cooperative  -ML    Care Plan Review evaluation/treatment results reviewed  -ML    Patient Effort good  -ML    Symptoms Noted During/After Treatment none  -ML               Patient Profile Reviewed yes  -ML    Pertinent History Of Current Problem reconsulted this date due to continued reported difficulty with swallowing per RN. Wife says this is baseline. On last date seen, SLP recommended VFSS but pt unable to have  barium at that time. Pt was progressed to full liquid diet beofre being made NPO  per RN per GI.  -ML    Current Method of Nutrition NPO  -ML               Dentition Assessment natural, present and adequate  -ML               Oral Motor General Assessment generalized oral motor weakness  -ML               Respiratory Support Currently in Use room air  -ML    Eating/Swallowing Skills fed by SLP; self-fed  -ML    Positioning During Eating upright 90 degree  -ML    Utensils Used spoon; cup  -ML    Consistencies Trialed pureed; ice chips; thin liquids  -ML               Clinical Swallow Evaluation Summary Re-evaluation completed during trials of ice chips, single drinks of thin via cup, and puree. Oral phase appeared functional for limited consistencies. Pt noted to swallow 3-4 times with each trial of thin liquids (some swallows appeared piecemeal and then consecutive swallows). Each swallow of puree and thin liquids was audible suggesting possible impaired pharyngeal contraction and pressure issues. Pt also noted to burp after each swallow suggesting possible esophageal issues. Pt exhibited no overt s/s of penetration/aspiration during consistencies assesed. Recommend to continue full liquid diet. [facility] allowed to have [facility] now. If not, would recommend FEES. Discussed with pt's wife and pt and in agreement. RN to check to see of pt able to have barium. Meds whole or crushed in puree at this time.  -ML               SLP Swallowing Diagnosis suspected pharyngeal dysphagia; esophageal dysphagia  -ML    Functional Impact risk of aspiration/pneumonia  -ML    Rehab Potential/Prognosis, Swallowing good, to achieve stated therapy goals  -ML    Swallow Criteria for Skilled Therapeutic Interventions Met demonstrates skilled criteria  -ML               Therapy Frequency (Swallow) PRN  -ML    Predicted Duration Therapy Intervention (Days) until discharge  -ML    SLP Diet  Recommendation full liquid diet  per team  -ML    Recommended Diagnostics VFSS ([facility]); FEES  -ML    Recommended Precautions and Strategies upright posture during/after eating; small bites of food and sips of liquid; no straw  -ML    Oral Care Recommendations Oral Care BID/PRN  -ML    SLP Rec. for Method of Medication Administration meds crushed; with pudding or applesauce  -ML    Monitor for Signs of Aspiration yes; notify SLP if any concerns; other (see comments)  -ML               Oral Nutrition/Hydration Goal Selection (SLP) --  goals to be established after instrumental  -ML          User Key  (r) = Recorded By, (t) = Taken By, (c) = Cosigned By    Initials Name Effective Dates    Kathe Menon MS CCC-SLP 06/16/21 -                 EDUCATION  The patient has been educated in the following areas:   Dysphagia (Swallowing Impairment).         SLP Outcome Measures (last 72 hours)     SLP Outcome Measures     Row Name 11/11/21 1400             SLP Outcome Measures    Outcome Measure Used? Adult NOMS  -ML              Adult FCM Scores    FCM Chosen Swallowing  -ML      Swallowing FCM Score 4  -ML            User Key  (r) = Recorded By, (t) = Taken By, (c) = Cosigned By    Initials Name Effective Dates    Kathe Menon MS CCC-SLP 06/16/21 -                  Time Calculation:    Time Calculation- SLP     Row Name 11/11/21 1430             Time Calculation- SLP    SLP Start Time 1230  -ML      SLP Received On 11/11/21  -ML              Untimed Charges    SLP Eval/Re-eval  ST Eval Oral Pharyng Swallow - 64570  -ML      [id number] Eval Oral Pharyng Swallow Minutes --  -ML      [id number]-[id number] Treatment Swallow Minutes 60  -ML              Total Minutes    Untimed Charges Total Minutes 60  -ML       Total Minutes 60  -ML            User Key  (r) = Recorded By, (t) = Taken By, (c) = Cosigned By    Initials Name Provider Type    Kathe Menon MS CCC-SLP Speech and Language Pathologist                 Therapy Charges for Today     Code Description Service Date Service Provider Modifiers Qty    [id number]  ST TREATMENT SWALLOW 4 11/11/2021 Kathe Medina, MS CCC-SLP GN 1               Kathe Mdeina MS CCC-SLP  11/11/2021

## 2021-11-11 NOTE — PLAN OF CARE
Problem: Adult Inpatient Plan of Care  Goal: Plan of Care Review  Outcome: Ongoing, Progressing  Flowsheets (Taken 11/11/2021 6559)  Progress: improving  Plan of Care Reviewed With: patient  Outcome Summary: Pt vitals stable. Thoracentesis today1.6 L removed. Heparin gtt started. PTT recheck at 1830. PT ordered. Q 2 turn. Tolerating full liq diet. Monitor for bleeding. Will continue to monitor   Goal Outcome Evaluation:  Plan of Care Reviewed With: patient        Progress: improving  Outcome Summary: Pt vitals stable. Thorcentesis today1.6 L removed. Heparin gtt started. PTT recheck at 1830. PT ordered. Q 2 turn. Tolerating full liq diet. Monitor for bleeding. Will continue to monitor

## 2021-11-11 NOTE — PROGRESS NOTES
[facility] Consult Note          PATIENT IDENTIFICATION:   Name:  Francisco Sequeira      MRN:  [id number]     85 y.o.  male               SUBJECTIVE:   No new complaints noted overnight.  Wife noted poor oral intake.  Watery stool  Had some problem with swallowing clear liquid diet yesterday.  Thoracentesis planned for today  Urine output has not been recorded  OBJECTIVE:  Vitals:    11/10/21 1448 11/10/21 2015 11/10/21 2328 11/11/21 0736   BP:  140/61 144/80 140/64   BP Location:  Right arm Right arm Right arm   Patient Position:  Lying Sitting Lying   Pulse: 88 84 88 88   Resp:  20 20 22   Temp:  97.7 °F (36.5 °C) 98.1 °F (36.7 °C) 97.7 °F (36.5 °C)   TempSrc:  Oral Oral Oral   SpO2:  98% 96%    Weight:       Height:               Body mass index is 24.41 kg/m².    Intake/Output Summary (Last 24 hours) at 11/11/2021 0833  Last data filed at 11/10/2021 2328  Gross per 24 hour   Intake 0 ml   Output 225 ml   Net -225 ml     Wt Readings from Last 1 Encounters:   11/02/21 0344 81.6 kg (180 lb)     Wt Readings from Last 3 Encounters:   11/02/21 81.6 kg (180 lb)   10/24/21 85.3 kg (188 lb)   07/30/21 86.2 kg (190 lb)       Physical Exam:  NAD; pleasant; partially oriented   Pale; looks stated age  MMM; AT/NC   No eye discharge; no scleral icterus  No JVD; no carotid bruits  Decreased breath sounds in bases bilat; not labored on RA  Irregularly irregular, metal click, 2/6M  Firm, +T but no G or R, +D, BS+  Trace LE edema  No clubbing  No asterixis  Moves all extremities   Mood and affect are normal    Scheduled meds:    atorvastatin, 40 mg, Oral, Daily  dextrose, 25 g, Intravenous, Once  digoxin, 125 mcg, Oral, Daily  ferrous sulfate, 325 mg, Oral, Daily With Breakfast  furosemide, 40 mg, Oral, Daily  guaiFENesin, 600 mg, Oral, Q12H  insulin lispro, 0-9 Units, Subcutaneous, 4x Daily With Meals & Nightly  magnesium oxide, 400 mg, Oral, BID  metoprolol succinate XL, 25 mg, Oral,  Daily  pantoprazole, 40 mg, Intravenous, Q12H  sodium bicarbonate, 1,950 mg, Oral, BID      IV meds:                        hold, 1 each  sodium chloride, 30 mL/hr        Data Review:    Results from last 7 days   Lab Units 11/11/21  0512 11/10/21  0618 11/09/21  1109 11/06/21  0550 11/05/21  1000   SODIUM mmol/L 140 143 145   < > 144   POTASSIUM mmol/L 3.6 4.0 4.2   < > 5.0   CHLORIDE mmol/L 107 111* 113*   < > 111*   CO2 mmol/L 23.5 22.6 21.5*   < > 20.5*   BUN mg/dL 20 21 25*   < > 49*   CREATININE mg/dL 1.20 1.05 1.37*   < > 2.06*   CALCIUM mg/dL 8.2* 8.1* 7.9*   < > 8.0*   BILIRUBIN mg/dL  --   --   --   --  0.7   ALK PHOS U/L  --   --   --   --  79   ALT (SGPT) U/L  --   --   --   --  18   AST (SGOT) U/L  --   --   --   --  31   GLUCOSE mg/dL 140* 117* 134*   < > 184*    < > = values in this interval not displayed.     Estimated Creatinine Clearance: 53.8 mL/min (by C-G formula based on SCr of 1.2 mg/dL).  Results from last 7 days   Lab Units 11/05/21  1808   SODIUM UR mmol/L 26   CREATININE UR mg/dL 91.0     Results from last 7 days   Lab Units 11/11/21  0512 11/10/21  0618 11/09/21  1109 11/06/21  0550 11/05/21  1000   MAGNESIUM mg/dL  --   --   --   --  2.6*   PHOSPHORUS mg/dL 2.5 2.8 2.9   < >  --     < > = values in this interval not displayed.       Results from last 7 days   Lab Units 11/11/21  0512 11/10/21  0618 11/09/21  0603 11/08/21  1737 11/08/21  0427 11/07/21  1351 11/07/21  0538   WBC 10*3/mm3 6.08 7.25 7.19  --  7.23  --  10.92*   HEMOGLOBIN g/dL 8.6* 9.1* 8.4* 9.5* 8.4*   < > 8.5*   PLATELETS 10*3/mm3 234 244 219  --  195  --  182    < > = values in this interval not displayed.       Results from last 7 days   Lab Units 11/11/21  0512 11/10/21  0618 11/09/21  0603 11/08/21  0427 11/07/21  0537   INR  1.57* 1.73* 2.00* 1.89* 1.98*             ASSESSMENT:     Lower GI bleed    Atrial fibrillation (HCC)    Hypertension    Uncontrolled type 2 diabetes mellitus with complication, with long-term  current use of insulin (HCC)    Hx of aortic valve replacement, mechanical [Z95.2]    Stage 3b chronic kidney disease (HCC)    Chronic anticoagulation    History of nephrectomy    Abnormal urinalysis    Recurrent right pleural effusion    Hemiparesis of left nondominant side as late effect of cerebral infarction (HCC)    Rectus sheath hematoma    Acute posthemorrhagic anemia    Angiodysplasia of colon without hemorrhage    1. BYRON on CKD3, non-oliguric, improved  Etiology is secondary to prerenal state/ATN  due to hypovolemia, blood loss, hypotension, and recent contrast exposure on 11/2/21.  Bladder scans are normal.   Creatinine is lower than baseline probably due to decreased oral intake and mild hypervolemia.  2. Acute GI bleed with bloody stools noted this admission and tagged RBC scan showing bleed in the RLQ consistent with distal ileum or proximal colon.  Colonoscopy performed with AVM that was cauterized awaiting GI clearance for resuming anticoagulation   3. acute blood loss anemia related to GI bleed. He has received 6 units PRBCs this admission.  Hemoglobin has been stable  4. History of mechanical AVR and Atrial Fibrillation anticoagulated with warfarin. His warfarin is being held and IV heparin drip infusing  5. Hypertension, controlled  6. Recurrent right pleural effusion; transudate per last thoracentesis. Repeat thoracentesis planned when more stable. Home lasix of 40 mg daily is being held.   7. Type II diabetes. Hgb A1c 5.2 last admission.  8. History of CVA with left side hemiparesis.  Mild hypophosphatemia.  Likely secondary to decreased oral intake.  No indication for replacement this time.        PLAN:  1. Continue current dose of Lasix 40 mg p.o. daily.  2. Encourage p.o. intake  3. Consult speech therapy for swallow evaluation  4. Give potassium today given borderline low potassium      Discussed with wife and nursing staff    Laz Fernandes MD  11/11/2021  08:33 EST

## 2021-11-11 NOTE — PROGRESS NOTES
Name: Francisco Sequeira ADMIT: 2021   : 1935  PCP: Rubin Hinkle APRN    MRN: [id number] LOS: 9 days   AGE/SEX: 85 y.o. male  ROOM: E4/     Subjective   Subjective    No new issues overnight.  Underwent thoracentesis this morning removing 1.6 L.    Review of Systems   Constitutional: Positive for fatigue.   Gastrointestinal: Positive for abdominal pain, blood in stool and diarrhea.        Objective   Objective   Vital Signs  Temp:  [97.7 °F (36.5 °C)-99.1 °F (37.3 °C)] 97.7 °F (36.5 °C)  Heart Rate:  [] 100  Resp:  [16-22] 16  BP: (124-152)/(52-80) 124/58  SpO2:  [93 %-98 %] 96 %  on   ;   Device (Oxygen Therapy): room air  Body mass index is 24.41 kg/m².  Physical Exam  Vitals and nursing note reviewed.   Constitutional:       General: He is not in acute distress.     Appearance: He is ill-appearing. He is not toxic-appearing or diaphoretic.   HENT:      Head: Normocephalic and atraumatic.   Eyes:      General: No scleral icterus.     Extraocular Movements: Extraocular movements intact.      Conjunctiva/sclera: Conjunctivae normal.   Cardiovascular:      Rate and Rhythm: Normal rate. Rhythm irregularly irregular.      Pulses: Normal pulses.      Comments: Mechanical s2  Pulmonary:      Effort: Pulmonary effort is normal. No respiratory distress.      Breath sounds: Normal breath sounds. No wheezing or rales.      Comments: Decreased BS right base  Abdominal:      General: Bowel sounds are normal. There is no distension.      Palpations: Abdomen is soft.      Tenderness: There is abdominal tenderness (RLQ). There is guarding (voluntary). There is no rebound.   Musculoskeletal:         General: No swelling or deformity. Normal range of motion.      Cervical back: Neck supple. No rigidity.   Lymphadenopathy:      Cervical: No cervical adenopathy.   Skin:     General: Skin is warm and dry.      Capillary Refill: Capillary refill takes less than 2 seconds.      Coloration: Skin is pale.       Findings: Bruising (Right abdomen wall tracking superiorly down to groin and back) present.   Neurological:      Mental Status: He is alert and oriented to person, place, and time. Mental status is at baseline.      Motor: Weakness (very mild chronic left weakness, at baseline) present.   Psychiatric:         Mood and Affect: Mood normal.         Behavior: Behavior normal.       Results Review     I reviewed the patient's new clinical results.  Results from last 7 days   Lab Units 11/11/21  0512 11/10/21  0618 11/09/21  0603 11/08/21  1737 11/08/21 0427 11/08/21 0427   WBC 10*3/mm3 6.08 7.25 7.19  --   --  7.23   HEMOGLOBIN g/dL 8.6* 9.1* 8.4* 9.5*   < > 8.4*   PLATELETS 10*3/mm3 234 244 219  --   --  195    < > = values in this interval not displayed.     Results from last 7 days   Lab Units 11/11/21  0512 11/10/21  0618 11/09/21  1109 11/08/21  0427   SODIUM mmol/L 140 143 145 142   POTASSIUM mmol/L 3.6 4.0 4.2 4.1   CHLORIDE mmol/L 107 111* 113* 112*   CO2 mmol/L 23.5 22.6 21.5* 22.0   BUN mg/dL 20 21 25* 37*   CREATININE mg/dL 1.20 1.05 1.37* 1.36*   GLUCOSE mg/dL 140* 117* 134* 116*   EGFR IF NONAFRICN AM mL/min/1.73 58* 67 50* 50*     Results from last 7 days   Lab Units 11/11/21  0512 11/10/21  0618 11/09/21  1109 11/08/21  0427 11/06/21  0550 11/05/21  1000   ALBUMIN g/dL 2.60* 2.60* 2.60* 2.50*   < > 2.60*   BILIRUBIN mg/dL  --   --   --   --   --  0.7   ALK PHOS U/L  --   --   --   --   --  79   AST (SGOT) U/L  --   --   --   --   --  31   ALT (SGPT) U/L  --   --   --   --   --  18    < > = values in this interval not displayed.     Results from last 7 days   Lab Units 11/11/21  0512 11/10/21  0618 11/09/21  1109 11/08/21  0427 11/06/21  0550 11/05/21  1000   CALCIUM mg/dL 8.2* 8.1* 7.9* 7.7*   < > 8.0*   ALBUMIN g/dL 2.60* 2.60* 2.60* 2.50*   < > 2.60*   MAGNESIUM mg/dL  --   --   --   --   --  2.6*   PHOSPHORUS mg/dL 2.5 2.8 2.9 2.9   < >  --     < > = values in this interval not displayed.     Results  from last 7 days   Lab Units 11/11/21  0512 11/10/21  0618 11/09/21  0603 11/08/21  0427 11/07/21  0537 11/06/21  0550 11/05/21  1000   PROTIME Seconds 18.6* 20.0* 22.5* 21.5* 22.3* 22.6* 24.9*   INR  1.57* 1.73* 2.00* 1.89* 1.98* 2.02* 2.28*     Glucose   Date/Time Value Ref Range Status   11/11/2021 0616 133 (H) 70 - 130 mg/dL Final     Comment:     Meter: [id number] : [id number] Gomez Bertha NA   11/10/2021 2113 165 (H) 70 - 130 mg/dL Final     Comment:     Meter: [id number] : [id number] Gomez Bertha NA   11/10/2021 1658 143 (H) 70 - 130 mg/dL Final     Comment:     Meter: [id number] : [id number] Michele Salgado NA   11/10/2021 1124 130 70 - 130 mg/dL Final     Comment:     Meter: [id number] : [id number] Michele Damian NA   11/10/2021 0559 119 70 - 130 mg/dL Final     Comment:     Meter: [id number] : [id number] Cutler Malini NA   11/09/2021 2103 117 70 - 130 mg/dL Final     Comment:     Meter: [id number] : [id number] Cutler Malini NA   11/09/2021 1847 125 70 - 130 mg/dL Final     Comment:     Meter: [id number] : [id number] Coosadaprem Pizanoe        COLONOSCOPY  11/9  Impression:  - Preparation of the colon was poor.  - Diverticulosis in the sigmoid colon, in the descending colon and in the ascending colon.  - A single colonic angiodysplastic lesion. Treated with argon plasma coagulation (APC). Clip was placed.  - The examination was otherwise normal on direct and retroflexion views.  - No specimens collected.    CT Abdomen Pelvis Without Contrast  11/4  CONCLUSION:  1. Right-sided pleural effusion appears slightly larger compared to the  previous examination, new small left-sided pleural effusion has  developed. There is diffuse body wall edema and cardiac enlargement.  Small amount of free fluid is demonstrated within the peritoneal cavity.  2. Sizable bilobed collection has developed within the right rectus  abdominis muscle, consistent with hematoma. See above dimensions.  3. Diverticulosis of the  colon, subtle pericolonic induration about the  mid sigmoid suggests possible diverticulitis.  4. Urine demonstrates increased density, perhaps related to residual  contrast from previous CT or hemorrhage. Correlation with urinalysis  advised.  5. Gallstones.        Scheduled Medications  atorvastatin, 40 mg, Oral, Daily  dextrose, 25 g, Intravenous, Once  digoxin, 125 mcg, Oral, Daily  ferrous sulfate, 325 mg, Oral, Daily With Breakfast  furosemide, 40 mg, Oral, Daily  guaiFENesin, 600 mg, Oral, Q12H  insulin lispro, 0-9 Units, Subcutaneous, 4x Daily With Meals & Nightly  lidocaine, 10 mL, Injection, Once  magnesium oxide, 400 mg, Oral, BID  metoprolol succinate XL, 25 mg, Oral, Daily  pantoprazole, 40 mg, Intravenous, Q12H  sodium bicarbonate, 1,950 mg, Oral, BID    Infusions  heparin (porcine), 12 Units/kg/hr  hold, 1 each  sodium chloride, 30 mL/hr    Diet  NPO Diet       Assessment/Plan     Active Hospital Problems    Diagnosis  POA   • **Lower GI bleed [K92.2]  Yes   • Angiodysplasia of colon without hemorrhage [K55.20]  Unknown   • Rectus sheath hematoma [S30.1XXA]  No   • Acute posthemorrhagic anemia [D62]  Yes   • History of nephrectomy [Z90.5]  Not Applicable   • Abnormal urinalysis [R82.90]  Yes   • Recurrent right pleural effusion [J90]  Yes   • 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       86 yo gentleman with h/o DM2, AFib, Mech AVR (on warfarin), HTN, CKD3, h/o RCC s/p  "right nephrectomy and h/o CVA with mild residual left hemiparesis, admitted here from 10/23 to 10/30 for \"obscure\" GI bleed and bilateral pleural effusions (transudate), who presented to ER with c/o rectal bleeding and generalized weakness. Found to have drop in Hgb from 9.3 to 7.2. Stool grossly bloody in ER.     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  Hemoglobin has been relatively stable.  He has been cleared by GI to resume anticoagulation.  He underwent thoracentesis today.  Cardiology initiating heparin drip.  If stable resume warfarin.  Speech therapy to assess swallow with VFSS.  He is very weak and needs physical therapy who has been consulted.      SCDs for DVT prophylaxis.  Now on heparin drip.  Full code.  Discussed with patient and family.  Anticipate discharge home with HH vs SNU facility timing yet to be determined.      Santos Guzman MD  [facility]  11/11/21  10:25 EST          "

## 2021-11-11 NOTE — PROGRESS NOTES
"    Patient Name: Francisco Sequeira  :1935  85 y.o.      Patient Care Team:  Rubin Hinkle APRN as PCP - General (Family Medicine)  Audra Vazquez RPH as Pharmacist  Vasquez Niño PharmD as Pharmacist (Pharmacy)    Chief Complaint: follow up GIB, mechanical aortic valve    Interval History: on room air. Had thoracentesis this morning. Heparin drip started. He is not really short of breath. Had loose bowel movement this morning. No bleeding.    Objective   Vital Signs  Temp:  [97.4 °F (36.3 °C)-98.1 °F (36.7 °C)] 97.4 °F (36.3 °C)  Heart Rate:  [] 84  Resp:  [16-22] 16  BP: (124-152)/(58-80) 139/69    Intake/Output Summary (Last 24 hours) at 2021 1615  Last data filed at 2021 1433  Gross per 24 hour   Intake 0 ml   Output 1925 ml   Net -1925 ml     Flowsheet Rows      First Filed Value   Admission Height 182.9 cm (72\") Documented at 2021 0344   Admission Weight 81.6 kg (180 lb) Documented at 2021 0344          Physical Exam:   General Appearance:    Alert, cooperative, in no acute distress   Lungs:     Clear to auscultation.  Normal respiratory effort and rate.      Heart:    Regular rhythm and normal rate, normal S1 and S2, no murmurs, gallops or rubs.     Chest Wall:    No abnormalities observed   Abdomen:     Soft, nontender, positive bowel sounds.     Extremities:   no cyanosis, clubbing or edema.  No marked joint deformities.  Adequate musculoskeletal strength.       Results Review:    Results from last 7 days   Lab Units 21  0512   SODIUM mmol/L 140   POTASSIUM mmol/L 3.6   CHLORIDE mmol/L 107   CO2 mmol/L 23.5   BUN mg/dL 20   CREATININE mg/dL 1.20   GLUCOSE mg/dL 140*   CALCIUM mg/dL 8.2*         Results from last 7 days   Lab Units 21  0512   WBC 10*3/mm3 6.08   HEMOGLOBIN g/dL 8.6*   HEMATOCRIT % 26.5*   PLATELETS 10*3/mm3 234     Results from last 7 days   Lab Units 21  1143 21  0512 11/10/21  0618 21  0603   INR   --  1.57* 1.73* 2.00* "   APTT seconds 44.7*  --   --   --      Results from last 7 days   Lab Units 11/05/21  1000   MAGNESIUM mg/dL 2.6*                   Medication Review:   atorvastatin, 40 mg, Oral, Daily  dextrose, 25 g, Intravenous, Once  digoxin, 125 mcg, Oral, Daily  ferrous sulfate, 325 mg, Oral, Daily With Breakfast  furosemide, 40 mg, Oral, Daily  guaiFENesin, 600 mg, Oral, Q12H  insulin lispro, 0-9 Units, Subcutaneous, 4x Daily With Meals & Nightly  magnesium oxide, 400 mg, Oral, BID  metoprolol succinate XL, 25 mg, Oral, Daily  pantoprazole, 40 mg, Intravenous, Q12H  sodium bicarbonate, 1,950 mg, Oral, BID         heparin (porcine), 12 Units/kg/hr, Last Rate: 12 Units/kg/hr (11/11/21 1325)  hold, 1 each  sodium chloride, 30 mL/hr        Assessment/Plan   1. Acute lower GIB - colonoscopy  with AVM , cauterized and clip placed  3. Mechanical aortic valve - now on heparin. Watch for bleeding.  4. Chronic anticoagulation  5. Chronic kidney disease  6. Pleural effusion s/p right thoracentesis (1650 removed 10/25).  repeat thoracentesis today 1600 removed  7. History of stroke with therapeutic INR (2.2) at the time. Target increased to 3-3.5.  8. Rectal sheath hematoma, no surgical intervention.   9. Pulmonary hypertension    Heparin drip. Watch for bleeding.  Warfarin if he tolerates heparin.    LIZA Krishnamurthy  [facility]  11/11/21  16:15 EST

## 2021-11-12 ENCOUNTER — APPOINTMENT (OUTPATIENT)
Dept: GENERAL RADIOLOGY | Facility: HOSPITAL | Age: 86
End: 2021-11-12

## 2021-11-12 LAB
ALBUMIN SERPL-MCNC: 2.6 G/DL (ref 3.5–5.2)
ANION GAP SERPL CALCULATED.3IONS-SCNC: 8.5 MMOL/L (ref 5–15)
APTT PPP: 81.1 SECONDS (ref 22.7–35.4)
BASOPHILS # BLD AUTO: 0.03 10*3/MM3 (ref 0–0.2)
BASOPHILS NFR BLD AUTO: 0.5 % (ref 0–1.5)
BUN SERPL-MCNC: 19 MG/DL (ref 8–23)
BUN/CREAT SERPL: 18.3 (ref 7–25)
CALCIUM SPEC-SCNC: 8 MG/DL (ref 8.6–10.5)
CHLORIDE SERPL-SCNC: 109 MMOL/L (ref 98–107)
CO2 SERPL-SCNC: 24.5 MMOL/L (ref 22–29)
CREAT SERPL-MCNC: 1.04 MG/DL (ref 0.76–1.27)
CYTO UR: NORMAL
DEPRECATED RDW RBC AUTO: 62.9 FL (ref 37–54)
EOSINOPHIL # BLD AUTO: 0.24 10*3/MM3 (ref 0–0.4)
EOSINOPHIL NFR BLD AUTO: 4 % (ref 0.3–6.2)
ERYTHROCYTE [DISTWIDTH] IN BLOOD BY AUTOMATED COUNT: 20.5 % (ref 12.3–15.4)
GFR SERPL CREATININE-BSD FRML MDRD: 68 ML/MIN/1.73
GLUCOSE BLDC GLUCOMTR-MCNC: 114 MG/DL (ref 70–130)
GLUCOSE BLDC GLUCOMTR-MCNC: 136 MG/DL (ref 70–130)
GLUCOSE BLDC GLUCOMTR-MCNC: 180 MG/DL (ref 70–130)
GLUCOSE BLDC GLUCOMTR-MCNC: 210 MG/DL (ref 70–130)
GLUCOSE SERPL-MCNC: 123 MG/DL (ref 65–99)
HCT VFR BLD AUTO: 29.2 % (ref 37.5–51)
HGB BLD-MCNC: 8.7 G/DL (ref 13–17.7)
IMM GRANULOCYTES # BLD AUTO: 0.04 10*3/MM3 (ref 0–0.05)
IMM GRANULOCYTES NFR BLD AUTO: 0.7 % (ref 0–0.5)
INR PPP: 1.46 (ref 0.9–1.1)
LAB AP CASE REPORT: NORMAL
LYMPHOCYTES # BLD AUTO: 0.79 10*3/MM3 (ref 0.7–3.1)
LYMPHOCYTES NFR BLD AUTO: 13.3 % (ref 19.6–45.3)
MCH RBC QN AUTO: 25 PG (ref 26.6–33)
MCHC RBC AUTO-ENTMCNC: 29.8 G/DL (ref 31.5–35.7)
MCV RBC AUTO: 83.9 FL (ref 79–97)
MONOCYTES # BLD AUTO: 0.45 10*3/MM3 (ref 0.1–0.9)
MONOCYTES NFR BLD AUTO: 7.6 % (ref 5–12)
NEUTROPHILS NFR BLD AUTO: 4.39 10*3/MM3 (ref 1.7–7)
NEUTROPHILS NFR BLD AUTO: 73.9 % (ref 42.7–76)
NRBC BLD AUTO-RTO: 0 /100 WBC (ref 0–0.2)
PATH REPORT.FINAL DX SPEC: NORMAL
PATH REPORT.GROSS SPEC: NORMAL
PHOSPHATE SERPL-MCNC: 2.7 MG/DL (ref 2.5–4.5)
PLATELET # BLD AUTO: 233 10*3/MM3 (ref 140–450)
PMV BLD AUTO: 10.9 FL (ref 6–12)
POTASSIUM SERPL-SCNC: 3.7 MMOL/L (ref 3.5–5.2)
PROTHROMBIN TIME: 17.5 SECONDS (ref 11.7–14.2)
RBC # BLD AUTO: 3.48 10*6/MM3 (ref 4.14–5.8)
SODIUM SERPL-SCNC: 142 MMOL/L (ref 136–145)
WBC # BLD AUTO: 5.94 10*3/MM3 (ref 3.4–10.8)

## 2021-11-12 PROCEDURE — 85025 COMPLETE CBC W/AUTO DIFF WBC: CPT | Performed by: NURSE PRACTITIONER

## 2021-11-12 PROCEDURE — 99232 SBSQ HOSP IP/OBS MODERATE 35: CPT | Performed by: INTERNAL MEDICINE

## 2021-11-12 PROCEDURE — 74230 X-RAY XM SWLNG FUNCJ C+: CPT

## 2021-11-12 PROCEDURE — 97162 PT EVAL MOD COMPLEX 30 MIN: CPT

## 2021-11-12 PROCEDURE — 97110 THERAPEUTIC EXERCISES: CPT

## 2021-11-12 PROCEDURE — 85610 PROTHROMBIN TIME: CPT | Performed by: INTERNAL MEDICINE

## 2021-11-12 PROCEDURE — 80069 RENAL FUNCTION PANEL: CPT | Performed by: INTERNAL MEDICINE

## 2021-11-12 PROCEDURE — 63710000001 INSULIN LISPRO (HUMAN) PER 5 UNITS: Performed by: INTERNAL MEDICINE

## 2021-11-12 PROCEDURE — 36415 COLL VENOUS BLD VENIPUNCTURE: CPT | Performed by: INTERNAL MEDICINE

## 2021-11-12 PROCEDURE — 25010000002 HEPARIN (PORCINE) PER 1000 UNITS: Performed by: NURSE PRACTITIONER

## 2021-11-12 PROCEDURE — 97530 THERAPEUTIC ACTIVITIES: CPT

## 2021-11-12 PROCEDURE — 99232 SBSQ HOSP IP/OBS MODERATE 35: CPT | Performed by: NURSE PRACTITIONER

## 2021-11-12 PROCEDURE — 92611 MOTION FLUOROSCOPY/SWALLOW: CPT

## 2021-11-12 PROCEDURE — 82962 GLUCOSE BLOOD TEST: CPT

## 2021-11-12 PROCEDURE — 85730 THROMBOPLASTIN TIME PARTIAL: CPT | Performed by: HOSPITALIST

## 2021-11-12 RX ORDER — POTASSIUM CHLORIDE 750 MG/1
20 TABLET, FILM COATED, EXTENDED RELEASE ORAL DAILY
Status: DISCONTINUED | OUTPATIENT
Start: 2021-11-12 | End: 2021-11-12

## 2021-11-12 RX ORDER — POTASSIUM CHLORIDE 750 MG/1
20 TABLET, FILM COATED, EXTENDED RELEASE ORAL ONCE
Status: COMPLETED | OUTPATIENT
Start: 2021-11-12 | End: 2021-11-12

## 2021-11-12 RX ORDER — FUROSEMIDE 20 MG/1
20 TABLET ORAL DAILY
Status: DISCONTINUED | OUTPATIENT
Start: 2021-11-12 | End: 2021-11-13

## 2021-11-12 RX ADMIN — FUROSEMIDE 40 MG: 40 TABLET ORAL at 09:38

## 2021-11-12 RX ADMIN — FERROUS SULFATE TAB 325 MG (65 MG ELEMENTAL FE) 325 MG: 325 (65 FE) TAB at 09:38

## 2021-11-12 RX ADMIN — GUAIFENESIN 600 MG: 600 TABLET, EXTENDED RELEASE ORAL at 09:38

## 2021-11-12 RX ADMIN — PANTOPRAZOLE SODIUM 40 MG: 40 INJECTION, POWDER, FOR SOLUTION INTRAVENOUS at 09:43

## 2021-11-12 RX ADMIN — POTASSIUM CHLORIDE 20 MEQ: 750 TABLET, EXTENDED RELEASE ORAL at 12:21

## 2021-11-12 RX ADMIN — BARIUM SULFATE 55 ML: 0.81 POWDER, FOR SUSPENSION ORAL at 11:12

## 2021-11-12 RX ADMIN — SODIUM BICARBONATE 1950 MG: 650 TABLET ORAL at 22:40

## 2021-11-12 RX ADMIN — INSULIN LISPRO 4 UNITS: 100 INJECTION, SOLUTION INTRAVENOUS; SUBCUTANEOUS at 12:21

## 2021-11-12 RX ADMIN — BARIUM SULFATE 50 ML: 400 SUSPENSION ORAL at 11:13

## 2021-11-12 RX ADMIN — FUROSEMIDE 20 MG: 20 TABLET ORAL at 17:28

## 2021-11-12 RX ADMIN — MAGNESIUM OXIDE 400 MG (241.3 MG MAGNESIUM) TABLET 400 MG: TABLET at 22:39

## 2021-11-12 RX ADMIN — MAGNESIUM OXIDE 400 MG (241.3 MG MAGNESIUM) TABLET 400 MG: TABLET at 09:38

## 2021-11-12 RX ADMIN — DIGOXIN 125 MCG: 250 TABLET ORAL at 09:38

## 2021-11-12 RX ADMIN — BARIUM SULFATE 4 ML: 980 POWDER, FOR SUSPENSION ORAL at 11:13

## 2021-11-12 RX ADMIN — HEPARIN SODIUM 10 UNITS/KG/HR: 10000 INJECTION, SOLUTION INTRAVENOUS at 15:13

## 2021-11-12 RX ADMIN — INSULIN LISPRO 2 UNITS: 100 INJECTION, SOLUTION INTRAVENOUS; SUBCUTANEOUS at 22:38

## 2021-11-12 RX ADMIN — ATORVASTATIN CALCIUM 40 MG: 20 TABLET, FILM COATED ORAL at 09:45

## 2021-11-12 RX ADMIN — GUAIFENESIN 600 MG: 600 TABLET, EXTENDED RELEASE ORAL at 22:39

## 2021-11-12 RX ADMIN — PANTOPRAZOLE SODIUM 40 MG: 40 INJECTION, POWDER, FOR SOLUTION INTRAVENOUS at 22:39

## 2021-11-12 RX ADMIN — METOPROLOL SUCCINATE 25 MG: 25 TABLET, EXTENDED RELEASE ORAL at 09:38

## 2021-11-12 RX ADMIN — SODIUM BICARBONATE 1950 MG: 650 TABLET ORAL at 09:38

## 2021-11-12 NOTE — PROGRESS NOTES
"Adult Nutrition  Assessment/PES    Patient Name:  Francisco Sequeira  YOB: 1935  MRN: [id number]  Admit Date:  11/2/2021    Assessment Date:  11/12/2021    Comments:  Nutrition assessment complete due to ^ LOS. Admitted with GI bleed, Has AVM, A/CKD; hx DM.  VFSS today noted - OK for Mech Soft, thin liquids. Has been on Full liquids and clears/NPO prior to that.  75% po this am.  Discussed with RN. Pt w/o GI complains except some loose/liquid stools.      Will add Boost glucose control with meals. Encouraged po.     Reason for Assessment     Row Name 11/12/21 1347          Reason for Assessment    Reason For Assessment identified at risk by screening criteria     Diagnosis gastrointestinal disease; renal disease  GI bleed with melena and bright red rectal bleeding, A/CKD, AVM, DM                Nutrition/Diet History     Row Name 11/12/21 1348          Nutrition/Diet History    Factors Affecting Nutritional Intake altered gastrointestinal function                Anthropometrics     Row Name 11/12/21 1348          Anthropometrics    Height 182.9 cm (72\")            Admit Weight    Admit Weight Method estimated     Admit Weight 81.6 kg (180 lb)            Ideal Body Weight (IBW)    Ideal Body Weight (IBW) (kg) 82.07     % of Ideal Body Weight Assessment --  99% IBW            Body Mass Index (BMI)    BMI Assessment BMI 18.5-24.9: normal  BMI 24                Labs/Tests/Procedures/Meds     Row Name 11/12/21 1346          Labs/Procedures/Meds    Lab Results Reviewed reviewed, pertinent     Lab Results Comments Glu, alb, h/h            Diagnostic Tests/Procedures    Diagnostic Test/Procedure Reviewed reviewed, pertinent     Diagnostic Test/Procedures Comments VFSS, SLP eval; Thorcentesis 11/11; C-scope            Medications    Pertinent Medications Reviewed reviewed, pertinent     Pertinent Medications Comments lipitor, iron, lasix, admelog, protonix, [place] tid, heparin                Physical " "Findings     Row Name 11/12/21 1351          Physical Findings    Skin other (see comments); edema  bruising, excoriation                Estimated/Assessed Needs     Row Name 11/12/21 1351 11/12/21 134       Calculation Measurements    Weight Used For Calculations 81.6 kg (179 lb 14.3 oz) --    Height -- 182.9 cm (72\")       Estimated/Assessed Needs    Additional Documentation KCAL/KG (Group); Fluid Requirements (Group); Protein Requirements (Group) --       KCAL/KG    KCAL/KG 25 Kcal/Kg (kcal) --    25 Kcal/Kg (kcal) 2040 --       Protein Requirements    Weight Used For Protein Calculations 81.6 kg (179 lb 14.3 oz) --    Est Protein Requirement Amount (gms/kg) 1.0 gm protein --    Estimated Protein Requirements (gms/day) 81.6 --       Fluid Requirements    Fluid Requirements (mL/day) 2000 --    RDA Method (mL) 2000 --               Nutrition Prescription Ordered     Row Name 11/12/21 1351          Nutrition Prescription PO    Current PO Diet Full Liquid                Evaluation of Received Nutrient/Fluid Intake     Row Name 11/12/21 2496          PO Evaluation    Number of Meals 1     % PO Intake 75%               Problem/Interventions:   Problem 1     Row Name 11/12/21 0813          Nutrition Diagnoses Problem 1    Problem 1 Altered GI Function     Etiology (related to) Medical Diagnosis     Gastrointestinal Gastrointestinal bleed                  Intervention Goal     Row Name 11/12/21 3682          Intervention Goal    General Maintain nutrition; Improved nutrition related lab(s); Reduce/improve symptoms; Meet nutritional needs for age/condition; Disease management/therapy     PO Tolerate PO; PO intake (%)     PO Intake % 80 %     Weight Maintain weight                Nutrition Intervention     Row Name 11/12/21 4569          Nutrition Intervention    RD/Tech Action Care plan reviewd; Follow Tx progress; Encourage intake; Interview for preference; Recommend/ordered     Recommended/Ordered Supplement           "      Nutrition Prescription     Row Name 11/12/21 1359          Nutrition Prescription PO    PO Prescription Begin/change supplement     Supplement Boost Glucose Control     Supplement Frequency 3 times a day     New PO Prescription Ordered? Yes                Education/Evaluation     Row Name 11/12/21 1359          Education    Education Will Instruct as appropriate            Monitor/Evaluation    Monitor Skin status; Per protocol; I&O; Symptoms; PO intake; Supplement intake; Pertinent labs; Weight                 Electronically signed by:  Margarita Osorio RD  11/12/21 14:00 EST

## 2021-11-12 NOTE — PLAN OF CARE
Goal Outcome Evaluation:              Outcome Summary: Awaiting clearance for solids and barium use for VFSS. VFSS scheduled for 1045. Could also preform FEES, but would need negative covid test. Chart review reveals this is a chronic problem for the patient, could wait for instrumental until outpatient setting if indicated. Please advise. Call at [phone] to discuss or secure chat SLP.

## 2021-11-12 NOTE — PLAN OF CARE
Goal Outcome Evaluation:  Plan of Care Reviewed With: patient           Outcome Summary: VFSS completed. GI cleared barium use. Hard solids not tested d/t risk of bleeding. Pt exhibited trace aspiration with all consistencies. A diet modification would not eliminate the patient's risk for aspiration at this time. Pt denies pna hx, but he did recently have a thoracentesis. SLP recs dysphagia treatment. Continue thins and allow mech soft, no mixed, when appropriate per MD.    Patient was not wearing a face mask during this therapy encounter. Therapist used appropriate personal protective equipment including mask, eye protection and gloves.  Mask used was standard procedure mask. Appropriate PPE was worn during the entire therapy session. Hand hygiene was completed before and after therapy session. Patient is not in enhanced droplet precautions.

## 2021-11-12 NOTE — PROGRESS NOTES
"    Patient Name: Francisco Sequeira  :1935  85 y.o.      Patient Care Team:  Rubin Hinkle APRN as PCP - General (Family Medicine)  Audra Vazquez RPH as Pharmacist  Vasquez Niño PharmD as Pharmacist (Pharmacy)    Chief Complaint: follow up GIB, mechanical aortic valve    Interval History: had a loose bowel movement this morning. Hgb stable. Getting swallow eval today.    Objective   Vital Signs  Temp:  [97.4 °F (36.3 °C)-98.5 °F (36.9 °C)] 97.9 °F (36.6 °C)  Heart Rate:  [76-96] 96  Resp:  [16-18] 16  BP: (105-139)/(57-83) 105/57    Intake/Output Summary (Last 24 hours) at 2021 1126  Last data filed at 2021 0333  Gross per 24 hour   Intake 0 ml   Output 400 ml   Net -400 ml     Flowsheet Rows      First Filed Value   Admission Height 182.9 cm (72\") Documented at 2021 0344   Admission Weight 81.6 kg (180 lb) Documented at 2021 0344          Physical Exam:   General Appearance:    Alert, cooperative, in no acute distress   Lungs:     Clear to auscultation.  Normal respiratory effort and rate.      Heart:    Regular rhythm and normal rate, normal S1 and S2, no murmurs, gallops or rubs.     Chest Wall:    No abnormalities observed   Abdomen:     Soft, nontender, positive bowel sounds.     Extremities:   no cyanosis, clubbing or edema.  No marked joint deformities.  Adequate musculoskeletal strength.       Results Review:    Results from last 7 days   Lab Units 21  0354   SODIUM mmol/L 142   POTASSIUM mmol/L 3.7   CHLORIDE mmol/L 109*   CO2 mmol/L 24.5   BUN mg/dL 19   CREATININE mg/dL 1.04   GLUCOSE mg/dL 123*   CALCIUM mg/dL 8.0*         Results from last 7 days   Lab Units 21  0354   WBC 10*3/mm3 5.94   HEMOGLOBIN g/dL 8.7*   HEMATOCRIT % 29.2*   PLATELETS 10*3/mm3 233     Results from last 7 days   Lab Units 21  0354 21  1953 21  1143 21  0512 11/10/21  0618   INR  1.46*  --   --  1.57* 1.73*   APTT seconds 81.1* 95.1* 44.7*  --   --                "         Medication Review:   atorvastatin, 40 mg, Oral, Daily  dextrose, 25 g, Intravenous, Once  digoxin, 125 mcg, Oral, Daily  ferrous sulfate, 325 mg, Oral, Daily With Breakfast  furosemide, 20 mg, Oral, Daily  furosemide, 40 mg, Oral, Daily  guaiFENesin, 600 mg, Oral, Q12H  insulin lispro, 0-9 Units, Subcutaneous, 4x Daily With Meals & Nightly  magnesium oxide, 400 mg, Oral, BID  metoprolol succinate XL, 25 mg, Oral, Daily  pantoprazole, 40 mg, Intravenous, Q12H  potassium chloride, 20 mEq, Oral, Once  sodium bicarbonate, 1,950 mg, Oral, BID         heparin (porcine), 12 Units/kg/hr, Last Rate: 10 Units/kg/hr (11/11/21 2130)  hold, 1 each  sodium chloride, 30 mL/hr, Last Rate: Stopped (11/11/21 1928)        Assessment/Plan   1. Acute lower GIB - colonoscopy with AVM , cauterized and clip placed  3. Mechanical aortic valve - now on heparin. Watch for bleeding.  4. Chronic anticoagulation  5. Chronic kidney disease - nephrology following. On oral lasix.  6. Pleural effusion s/p right thoracentesis (1650 removed 10/25).  repeat thoracentesis 11/11/21 1600 removed  7. History of stroke with therapeutic INR (2.2) at the time. Target increased to 3-3.5.  8. Rectal sheath hematoma, no surgical intervention.   9. Pulmonary hypertension    Looks to be tolerating heparin drip so far. Watch for bleeding.   Warfarin if he tolerates heparin.     LIZA Krishnamurthy  [facility]  11/12/21  11:26 EST

## 2021-11-12 NOTE — THERAPY EVALUATION
Patient Name: Francisco Sequeira  : 1935    MRN: [id number]                              Today's Date: 2021       Admit Date: 2021    Visit Dx:     ICD-10-CM ICD-9-CM   1. Lower GI bleed  K92.2 578.9   2. Hypoglycemia  E16.2 251.2   3. On warfarin therapy  Z79.01 V58.61   4. 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)   • Iron deficiency anemia   • Chronic anticoagulation   • Lower GI bleed   • History of nephrectomy   • Abnormal urinalysis   • Recurrent right pleural effusion   • Hemiparesis of left nondominant side as late effect of cerebral infarction (HCC)   • Rectus sheath hematoma   • Acute posthemorrhagic anemia   • Angiodysplasia of colon without hemorrhage     Past Medical History:   Diagnosis Date   • Allergic rhinitis    • Aortic valve insufficiency    • Ascending aortic aneurysm (HCC)    • Atrial fibrillation (HCC)    • Bacteremia    • Calcific aortic stenosis of bicuspid valve    • Cardiac arrest (HCC)    • Cardiomyopathy (HCC)    • CKD (chronic kidney disease) stage 3, GFR 30-59 ml/min (HCC)    • Contact dermatitis due to poison ivy    • Elbow fracture    • Esophageal reflux    • GERD (gastroesophageal reflux disease)    • Head injury    • History of transfusion    • Hyperglycemia    • Hyperlipidemia    • Hypertension    • Kidney carcinoma (HCC)    • Nonischemic cardiomyopathy (HCC)    • Renal oncocytoma    • Seasonal allergic  reaction    • Sinusitis    • Syncope    • Type 2 diabetes mellitus (HCC)     uncontrolled   • Visual field defect      Past Surgical History:   Procedure Laterality Date   • AORTIC VALVE REPAIR/REPLACEMENT     • ASCENDING AORTIC ANEURYSM REPAIR W/ MECHANICAL AORTIC VALVE REPLACEMENT     • COLONOSCOPY N/A 10/28/2021    Procedure: COLONOSCOPY to cecum:  cold snare polyps,;  Surgeon: Dinesh Meza MD;  Location:  [facility];  Service: Gastroenterology;  Laterality: N/A;  pre:  Iron deficiency anemia  post:  polyps, diverticulosis,    • COLONOSCOPY N/A 11/9/2021    Procedure: COLONOSCOPY to cecum with APC cautery of AVM and clip placement x1;  Surgeon: Pavan Rodriguez MD;  Location:  [facility];  Service: Gastroenterology;  Laterality: N/A;  PRE - gi bleed, anemia  POST - diverticulosis, poor prep, AVM right colon   • ENDOSCOPY N/A 10/28/2021    Procedure: ESOPHAGOGASTRODUODENOSCOPY with biopsies;  Surgeon: Dinesh Meza MD;  Location:  [facility];  Service: Gastroenterology;  Laterality: N/A;  pre:  Iron deficiency anemia  post:  duodenitis and gastritis   • EYE SURGERY  12/09/2020    cataract surgery    • NEPHRECTOMY     • OTHER SURGICAL HISTORY      elbow surgery   • OTHER SURGICAL HISTORY      right arm surgery   • PROSTATE SURGERY     • THORACENTESIS Left     diagnostic      General Information     Row Name 11/12/21 1519          Physical Therapy Time and Intention    Document Type evaluation  -CF     Mode of Treatment individual therapy; physical therapy  -CF     Row Name 11/12/21 1512          General Information    Patient Profile Reviewed yes  -CF     Prior Level of Function independent:; min assist:; driving; transfer; all household mobility; bed mobility  uses rollator, has 1 step but typically avoids, was driving, hx of L danny  -CF     Existing Precautions/Restrictions fall  -CF     Barriers to Rehab previous functional deficit  -CF     Row Name 11/12/21 5381           Living Environment    Lives With spouse  -[facility] Name 11/12/21 1519          Home Main Entrance    Number of Stairs, Main Entrance one  -     Row Name 11/12/21 1519          Stairs Within Home, Primary    Number of Stairs, Within Home, Primary one  -CF     Stair Railings, Within Home, Primary railings safe and in good condition  -[facility] Name 11/12/21 1519          Cognition    Orientation Status (Cognition) oriented x 4  -[facility] Name 11/12/21 1519          Safety Issues, Functional Mobility    Safety Issues Affecting Function (Mobility) insight into deficits/self-awareness  -     Impairments Affecting Function (Mobility) endurance/activity tolerance; strength; balance  -           User Key  (r) = Recorded By, (t) = Taken By, (c) = Cosigned By    Initials Name Provider Type    CF Rhoda Antunez PT Physical Therapist               Mobility     [facility] Name 11/12/21 1524          Bed Mobility    Bed Mobility supine-sit  -CF     Supine-Sit [place] (Bed Mobility) minimum assist (75% patient effort); verbal cues  -     Assistive Device (Bed Mobility) head of bed elevated  -[facility] Name 11/12/21 1524          Transfers    Comment (Transfers) Cues for hand placement  -[facility] Name 11/12/21 1524          Sit-Stand Transfer    Sit-Stand [place] (Transfers) contact guard; minimum assist (75% patient effort); verbal cues  -     Assistive Device (Sit-Stand Transfers) walker, front-wheeled  -[facility] Name 11/12/21 1524          Gait/Stairs (Locomotion)    [place] Level (Gait) minimum assist (75% patient effort); verbal cues  -CF     Assistive Device (Gait) walker, front-wheeled  -     Distance in Feet (Gait) 25'  -CF     Deviations/Abnormal Patterns (Gait) raul decreased; gait speed decreased  -CF     Bilateral Gait Deviations forward flexed posture  -CF     Left Sided Gait Deviations knee buckling, left side  -CF     Comment (Gait/Stairs) Slight knee buckling at times, hx of CVA  with L danny  -CF           User Key  (r) = Recorded By, (t) = Taken By, (c) = Cosigned By    Initials Name Provider Type    CF Rhoda Antunez PT Physical Therapist               Obj/Interventions     [facility] Name 11/12/21 1527          Range of Motion Comprehensive    General Range of Motion bilateral lower extremity ROM WFL  -CF     [facility] Name 11/12/21 1527          Strength Comprehensive (MMT)    Comment, General Manual Muscle Testing (MMT) Assessment Generalized weakness L > R  -CF     Row Name 11/12/21 1527          Balance    Balance Assessment sitting static balance; sitting dynamic balance; standing static balance; standing dynamic balance  -CF     Static Sitting Balance WFL; sitting, edge of bed  -CF     Dynamic Sitting Balance WFL; sitting, edge of bed  -CF     Static Standing Balance mild impairment; supported  -CF     Dynamic Standing Balance mild impairment; supported  -CF     Comment, Balance static standing at chair for urinal use, posterior lean noted and fatigued after approx 2 mins  -CF           User Key  (r) = Recorded By, (t) = Taken By, (c) = Cosigned By    Initials Name Provider Type     Rhoda Antunez PT Physical Therapist               Goals/Plan     Row Name 11/12/21 1614          Bed Mobility Goal 1 (PT)    Activity/Assistive Device (Bed Mobility Goal 1, PT) bed mobility activities, all  -CF     [place] Level/Cues Needed (Bed Mobility Goal 1, PT) standby assist  -CF     Time Frame (Bed Mobility Goal 1, PT) 2 weeks  -CF     [facility] Name 11/12/21 1614          Transfer Goal 1 (PT)    Activity/Assistive Device (Transfer Goal 1, PT) sit-to-stand/stand-to-sit; bed-to-chair/chair-to-bed; walker, rolling  -CF     [place] Level/Cues Needed (Transfer Goal 1, PT) contact guard assist  -CF     Time Frame (Transfer Goal 1, PT) 2 weeks  -CF     [facility] Name 11/12/21 1614          Gait Training Goal 1 (PT)    Activity/Assistive Device (Gait Training Goal 1, PT) gait (walking locomotion); walker,  rolling  -CF     [place] Level (Gait Training Goal 1, PT) contact guard assist  -CF     Distance (Gait Training Goal 1, PT) 80'  -CF     Time Frame (Gait Training Goal 1, PT) 2 weeks  -CF           User Key  (r) = Recorded By, (t) = Taken By, (c) = Cosigned By    Initials Name Provider Type    CF Rhoda Antunez, PT Physical Therapist               Clinical Impression     Row Name 11/12/21 8867          Pain    Additional Documentation Pain Scale: Numbers Pre/Post-Treatment (Group)  -CF     Row Name 11/12/21 7470          Pain Scale: Numbers Pre/Post-Treatment    Pretreatment Pain Rating 0/10 - no pain  -CF     Posttreatment Pain Rating 0/10 - no pain  -CF     Row Name 11/12/21 1605          Plan of Care Review    Plan of Care Reviewed With patient  -CF     Outcome Summary Pt is an 84 yo male seen for PT evaluation s/p thoracentesis. Pt lives with spouse in single level home with 1 step to enter. Pt typically uses a cane. Pt with hx of CVA with resulting mild L hemiparesis. Pt completed supine> sit with min A, sit<>stand with min A and ambulated 25' with cga. Pt with slight L knee buckling throughout - wife reports this is baseline due to previous CVA. Pt below baseline level and may benefit from skilled PT services 6x/week while inpatient to address deficits. Recommending d/c home with 24/7 assist and HH as pt and wife would not be agreeable to SNF. Will follow up once this weekend.  -     Row Name 11/12/21 5621          Therapy Assessment/Plan (PT)    Rehab Potential (PT) good, to achieve stated therapy goals  -CF     Criteria for Skilled Interventions Met (PT) yes  -CF     Predicted Duration of Therapy Intervention (PT) 2 weeks  -CF     Row Name 11/12/21 160          Vital Signs    O2 Delivery Pre Treatment room air  -CF     O2 Delivery Intra Treatment room air  -CF     O2 Delivery Post Treatment room air  -CF     Row Name 11/12/21 3666          Positioning and Restraints    Pre-Treatment Position in  bed  -CF     Post Treatment Position chair  -CF     In Chair call light within reach; encouraged to call for assist; sitting; exit alarm on; notified nsg; with family/caregiver  -CF           User Key  (r) = Recorded By, (t) = Taken By, (c) = Cosigned By    Initials Name Provider Type    Rhoda Dior, CICI Physical Therapist               Outcome Measures     Row Name 11/12/21 1616          How much help from another person do you currently need...    Turning from your back to your side while in flat bed without using bedrails? 3  -CF     Moving from lying on back to sitting on the side of a flat bed without bedrails? 2  -CF     Moving to and from a bed to a chair (including a wheelchair)? 3  -CF     Standing up from a chair using your arms (e.g., wheelchair, bedside chair)? 3  -CF     Climbing 3-5 steps with a railing? 2  -CF     To walk in hospital room? 3  -CF     AM-PAC 6 Clicks Score (PT) 16  -CF     Row Name 11/12/21 1616          Functional Assessment    Outcome Measure Options AM-PAC 6 Clicks Basic Mobility (PT)  -CF           User Key  (r) = Recorded By, (t) = Taken By, (c) = Cosigned By    Initials Name Provider Type    Rhoda Diro, CICI Physical Therapist                             Physical Therapy Education                 Title: PT OT SLP Therapies (Done)     Topic: Physical Therapy (Done)     Point: Mobility training (Done)     Learning Progress Summary           Patient Acceptance, E, VU,NR by CF at 11/12/2021 1616                   Point: Home exercise program (Done)     Learning Progress Summary           Patient Acceptance, E, VU,NR by CF at 11/12/2021 1616                   Point: Body mechanics (Done)     Learning Progress Summary           Patient Acceptance, E, VU,NR by CF at 11/12/2021 1616                   Point: Precautions (Done)     Learning Progress Summary           Patient Acceptance, E, VU,NR by CF at 11/12/2021 1616                               User Key     Initials  Effective Dates Name Provider Type Discipline    CF 06/16/21 -  Rhoda Antunez PT Physical Therapist PT              PT Recommendation and Plan  Planned Therapy Interventions (PT): balance training, bed mobility training, gait training, ROM (range of motion), strengthening, patient/family education, transfer training  Plan of Care Reviewed With: patient  Outcome Summary: Pt is an 86 yo male seen for PT evaluation s/p thoracentesis. Pt lives with spouse in single level home with 1 step to enter. Pt typically uses a cane. Pt with hx of CVA with resulting mild L hemiparesis. Pt completed supine> sit with min A, sit<>stand with min A and ambulated 25' with cga. Pt with slight L knee buckling throughout - wife reports this is baseline due to previous CVA. Pt below baseline level and may benefit from skilled PT services 6x/week while inpatient to address deficits. Recommending d/c home with 24/7 assist and HH as pt and wife would not be agreeable to SNF. Will follow up once this weekend.     Time Calculation:    PT Charges     Row Name 11/12/21 1518             Time Calculation    Start Time 1444  -CF      Stop Time 1513  -CF      Time Calculation (min) 29 min  -CF      PT Received On 11/12/21  -CF      PT - Next Appointment 11/13/21  -CF              Time Calculation- PT    Total Timed Code Minutes- PT 23 minute(s)  -CF            User Key  (r) = Recorded By, (t) = Taken By, (c) = Cosigned By    Initials Name Provider Type    CF Rhoda Antunez PT Physical Therapist              Therapy Charges for Today     Code Description Service Date Service Provider Modifiers Qty    45056107050 HC PT EVAL MOD COMPLEXITY 2 11/12/2021 Rhoda Antunez, PT GP 1    25455372236  PT THERAPEUTIC ACT EA 15 MIN 11/12/2021 Rhoda Antunez, PT GP 1    25833397265 HC PT THER PROC EA 15 MIN 11/12/2021 Rhoda Antunez, PT GP 1          PT G-Codes  Outcome Measure Options: AM-PAC 6 Clicks Basic Mobility (PT)  AM-PAC 6 Clicks Score  (PT): 16    Rhoda Antunez, PT  11/12/2021

## 2021-11-12 NOTE — PLAN OF CARE
Problem: Adult Inpatient Plan of Care  Goal: Plan of Care Review  11/12/2021 1808 by Balbina Payan, RN  Outcome: Ongoing, Progressing  Flowsheets  Taken 11/12/2021 1808  Progress: improving  Plan of Care Reviewed With: patient  Taken 11/12/2021 1713  Outcome Summary: Pt vitals stable. Heparin gtt continued. Diet changed to mech soft/no mix consistency. PT evaluated. q 2 turn. WIll continue to monitor  11/12/2021 1713 by Balbina Payan, RN  Outcome: Ongoing, Progressing  Flowsheets (Taken 11/12/2021 1713)  Progress: improving  Plan of Care Reviewed With: patient  Outcome Summary: Pt vitals stable. Heparin gtt continued. Diet changed to mech soft/no mix consistency. PT evaluated. q 2 turn. WIll continue to monitor   Goal Outcome Evaluation:  Plan of Care Reviewed With: patient        Progress: improving  Outcome Summary: Pt vitals stable. Heparin gtt continued. Diet changed to mech soft/no mix consistancy. PT evalauated. q 2 turn. WIll continue to monitor

## 2021-11-12 NOTE — PROGRESS NOTES
[facility]  Inpatient Progress Note    Reason for Follow Up:  Overt GI bleeding while on coumadin    Subjective     Interval History:   No gross evidence of bleeding.  His hemoglobin is 8.7 and stable today.    Current Facility-Administered Medications:   •  acetaminophen (TYLENOL) tablet 650 mg, 650 mg, Oral, Q4H PRN **OR** [DISCONTINUED] acetaminophen (TYLENOL) 160 MG/5ML solution 650 mg, 650 mg, Oral, Q4H PRN **OR** [DISCONTINUED] acetaminophen (TYLENOL) suppository 650 mg, 650 mg, Rectal, Q4H PRN, Carmen Welch APRN  •  atorvastatin (LIPITOR) tablet 40 mg, 40 mg, Oral, Daily, Pavan Rodriguez MD, 40 mg at 11/12/21 0945  •  dextrose (D50W) (25 g/50 mL) IV injection 25 g, 25 g, Intravenous, Q15 Min PRN, Pavan Rodriguez MD  •  dextrose (D50W) (25 g/50 mL) IV injection 25 g, 25 g, Intravenous, Once, Pavan Rodriguez MD  •  dextrose (GLUTOSE) oral gel 15 g, 15 g, Oral, Q15 Min PRN, Pavan Rodriguez MD  •  digoxin (LANOXIN) tablet 125 mcg, 125 mcg, Oral, Daily, Pavan Rodriguez MD, 125 mcg at 11/12/21 0938  •  diphenhydrAMINE (BENADRYL) capsule 25 mg, 25 mg, Oral, BID PRN, Pavan Rodriguez MD  •  ferrous sulfate tablet 325 mg, 325 mg, Oral, Daily With Breakfast, Pavan Rodriguez MD, 325 mg at 11/12/21 0938  •  furosemide (LASIX) tablet 20 mg, 20 mg, Oral, Daily, Laz Fernandes MD  •  furosemide (LASIX) tablet 40 mg, 40 mg, Oral, Daily, Laz Fernandes MD, 40 mg at 11/12/21 0938  •  glucagon (human recombinant) (GLUCAGEN DIAGNOSTIC) injection 1 mg, 1 mg, Subcutaneous, PRN, Pavan Rodriguez MD  •  guaiFENesin (MUCINEX) 12 hr tablet 600 mg, 600 mg, Oral, Q12H, Pavan Rodriguez MD, 600 mg at 11/12/21 0938  •  heparin 06083 units/250 mL (100 units/mL) in 0.45 % NaCl infusion, 12 Units/kg/hr, Intravenous, Titrated, Iza Mckeon, LIZA, Last Rate: 8.16 mL/hr at 11/12/21 1513, 10 Units/kg/hr at 11/12/21 1513  •  Hold medication, 1 each, Does not  apply, Continuous PRN, Lebron Cano MD  •  HYDROcodone-acetaminophen (NORCO) 5-325 MG per tablet 1 tablet, 1 tablet, Oral, Q6H PRN, Pavan Rodriguez MD, 1 tablet at 11/02/21 2347  •  insulin lispro (ADMELOG) injection 0-9 Units, 0-9 Units, Subcutaneous, 4x Daily With Meals & Nightly, Pavan Rodriguez MD, 4 Units at 11/12/21 1221  •  magnesium oxide (MAG-OX) tablet 400 mg, 400 mg, Oral, BID, Pavan Rodriguez MD, 400 mg at 11/12/21 0938  •  metoprolol succinate XL (TOPROL-XL) 24 hr tablet 25 mg, 25 mg, Oral, Daily, Pavan Rodriguez MD, 25 mg at 11/12/21 0938  •  nitroglycerin (NITROSTAT) SL tablet 0.4 mg, 0.4 mg, Sublingual, Q5 Min PRN, Pavan Rodriguez MD  •  ondansetron (ZOFRAN) tablet 4 mg, 4 mg, Oral, Q6H PRN **OR** ondansetron (ZOFRAN) injection 4 mg, 4 mg, Intravenous, Q6H PRN, Pavan Rodriguez MD, 4 mg at 11/04/21 0008  •  pantoprazole (PROTONIX) injection 40 mg, 40 mg, Intravenous, Q12H, Pavan Rodriguez MD, 40 mg at 11/12/21 0943  •  sodium bicarbonate tablet 1,950 mg, 1,950 mg, Oral, BID, Pavan Rodriguez MD, 1,950 mg at 11/12/21 0938  •  sodium chloride 0.9 % infusion, 30 mL/hr, Intravenous, Continuous PRN, Pavan Rodriguez MD, Stopped at 11/11/21 1928  Review of Systems:    The following systems were reviewed and negative;  constitution, ENT, respiratory, cardiovascular, genitourinary, musculoskeletal and neurological    Objective     Vital Signs  Temp:  [97.7 °F (36.5 °C)-98.5 °F (36.9 °C)] 97.7 °F (36.5 °C)  Heart Rate:  [76-96] 77  Resp:  [16-18] 16  BP: (105-124)/(57-83) 112/57  Body mass index is 24.41 kg/m².    Intake/Output Summary (Last 24 hours) at 11/12/2021 1618  Last data filed at 11/12/2021 1445  Gross per 24 hour   Intake 0 ml   Output 300 ml   Net -300 ml     I/O this shift:  In: -   Out: 100 [Urine:100]     Physical Exam:   General: patient awake, alert and cooperative   Eyes: Normal lids and lashes, no scleral icterus   Neck: supple, normal  ROM   Skin: warm and dry, not jaundiced   Cardiovascular: regular rhythm and rate, no murmurs auscultated   Pulm: clear to auscultation bilaterally, regular and unlabored   Abdomen: soft, nontender, nondistended; normal bowel sounds   Extremities: no rash or edema   Psychiatric: Normal mood and behavior; memory intact     Results Review:     I reviewed the patient's new clinical results.    Results from last 7 days   Lab Units 11/12/21  0354 11/11/21  0512 11/10/21  0618   WBC 10*3/mm3 5.94 6.08 7.25   HEMOGLOBIN g/dL 8.7* 8.6* 9.1*   HEMATOCRIT % 29.2* 26.5* 30.0*   PLATELETS 10*3/mm3 233 234 244     Results from last 7 days   Lab Units 11/12/21  0354 11/11/21  0512 11/10/21  0618   SODIUM mmol/L 142 140 143   POTASSIUM mmol/L 3.7 3.6 4.0   CHLORIDE mmol/L 109* 107 111*   CO2 mmol/L 24.5 23.5 22.6   BUN mg/dL 19 20 21   CREATININE mg/dL 1.04 1.20 1.05   CALCIUM mg/dL 8.0* 8.2* 8.1*   GLUCOSE mg/dL 123* 140* 117*     Results from last 7 days   Lab Units 11/12/21  0354 11/11/21  0512 11/10/21  0618   INR  1.46* 1.57* 1.73*     No results found for: LIPASE    Radiology:  FL Video Swallow With Speech Single Contrast   Final Result   Fluoroscopy was provided for the speech pathologist during a   video swallow study. For full details please see the speech pathology   report       This report was finalized on 11/12/2021 2:34 PM by Dr. Antonio Hart M.D.          XR Chest 2 View   Final Result      US Thoracentesis   Final Result   Ultrasound-guided right thoracentesis as described.           This report was finalized on 11/11/2021 9:51 AM by Dr. Manuel Mclean M.D.          XR Chest 1 View   Final Result      CT Abdomen Pelvis Without Contrast   Final Result      XR Chest 1 View   Final Result   Moderate right pleural effusion appears decreased from the   prior exam. Atelectasis/infiltrate is apparent in the right lower lung.   Continued follow-up recommended.         This report was finalized on 11/2/2021 4:34 PM  by Dr. Chava Luke M.D.          NM GI Blood Loss   Final Result   Active GI bleeding from the right lower quadrant in the   region of either the distal ileum or the proximal ascending colon. I   discussed the case with Dr. Al Gerard at around 3:45 PM on the   day of the exam.       This report was finalized on 11/2/2021 5:51 PM by Dr. Grant Jorge M.D.          CT Abdomen Pelvis With Contrast   Final Result       1. No obvious etiology for the patient's GI bleeding is identified.   Patient does have colonic diverticulosis. There is no diverticulitis.   2. Large right pleural effusion. This may be slightly smaller than on   prior study. Previously identified left pleural effusion has almost   completely resolved.       Radiation dose reduction techniques were utilized, including automated   exposure control and exposure modulation based on body size.       This report was finalized on 11/2/2021 5:38 AM by Dr. Yue Frye M.D.              Assessment/Plan     Patient Active Problem List   Diagnosis   • Atrial fibrillation (HCC)   • Hypertension   • Atopic rhinitis   • Gastroesophageal reflux disease   • Hyperlipidemia   • Uncontrolled type 2 diabetes mellitus with complication, with long-term current use of insulin (HCC)   • Low testosterone   • Medicare annual wellness visit, subsequent   • Hx of aortic valve replacement, mechanical [Z95.2]   • Kidney carcinoma (HCC)   • Stage 3b chronic kidney disease (HCC)   • 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)   • Iron deficiency anemia   • Chronic anticoagulation   • Lower GI bleed   • History of nephrectomy   • Abnormal urinalysis   • Recurrent right pleural effusion   • Hemiparesis of left nondominant side as late effect of cerebral infarction (HCC)   • Rectus sheath hematoma   • Acute posthemorrhagic anemia   • Angiodysplasia of colon without hemorrhage        Assessment/Recommendations:    Assessment:  1. GI bleed with melena and bright red rectal bleeding, resolved. He tolerated a colonoscopy 11/9 when an AVM was found and treated in the ascending colon.   2. Abnormal tagged red blood cell scan positive with activity in the cecum/terminal ileum  3. Acute blood loss anemia, stable  4. Anticoagulated on Coumadin, currently held, but he is back on heparin drip.  No gross evidence of bleeding.  5. History of mechanical aortic valve  6. Acute kidney injury on chronic kidney disease 3     Plan:  -Continue serial H&H's and transfuse as necessary.  -You could restart the Coumadin.    GI will sign off and see as needed.    I discussed the patients findings and my recommendations with patient and family.    Oniel Moon MD

## 2021-11-12 NOTE — PLAN OF CARE
Goal Outcome Evaluation:  Plan of Care Reviewed With: patient           Outcome Summary: Pt is an 86 yo male seen for PT evaluation s/p thoracentesis. Pt lives with spouse in single level home with 1 step to enter. Pt typically uses a cane. Pt with hx of CVA with resulting mild L hemiparesis. Pt completed supine> sit with min A, sit<>stand with min A and ambulated 25' with cga. Pt with slight L knee buckling throughout - wife reports this is baseline due to previous CVA. Pt below baseline level and may benefit from skilled PT services 6x/week while inpatient to address deficits. Recommending d/c home with 24/7 assist and HH as pt and wife would not be agreeable to SNF. Will follow up once this weekend.    Patient was not wearing a face mask during this therapy encounter. Therapist used appropriate personal protective equipment including eye protection, mask, and gloves.  Mask used was standard procedure mask. Appropriate PPE was worn during the entire therapy session. Hand hygiene was completed before and after therapy session. Patient is not in enhanced droplet precautions.

## 2021-11-12 NOTE — PROGRESS NOTES
[facility] Consult Note          PATIENT IDENTIFICATION:   Name:  Francisco Sequeira      MRN:  [id number]     85 y.o.  male               SUBJECTIVE:   Very pleasant today.  Having his breakfast.  Denies any chest pain and no shortness of breath.  Had thoracentesis yesterday and had 1.6 L of fluid removed.  No more melena.  He was evaluated by speech therapy and recommended swallow evaluation.    OBJECTIVE:  Vitals:    11/11/21 1914 11/11/21 2300 11/12/21 0736 11/12/21 0938   BP: 121/70 110/57 124/83 105/57   BP Location: Right arm Right arm Right arm    Patient Position: Lying Lying Lying    Pulse: 79 76 82 96   Resp: 18 16 16    Temp: 98.5 °F (36.9 °C) 98.5 °F (36.9 °C) 97.9 °F (36.6 °C)    TempSrc: Oral Oral Oral    SpO2: 97% 95% 97%    Weight:       Height:               Body mass index is 24.41 kg/m².    Intake/Output Summary (Last 24 hours) at 11/12/2021 1103  Last data filed at 11/12/2021 0333  Gross per 24 hour   Intake 0 ml   Output 400 ml   Net -400 ml     Wt Readings from Last 1 Encounters:   11/02/21 0344 81.6 kg (180 lb)     Wt Readings from Last 3 Encounters:   11/02/21 81.6 kg (180 lb)   10/24/21 85.3 kg (188 lb)   07/30/21 86.2 kg (190 lb)       Physical Exam:  NAD; pleasant; partially oriented   Pale; looks stated age  MMM; AT/NC   No eye discharge; no scleral icterus  No JVD; no carotid bruits  Decreased breath sounds in bases bilat; not labored on RA  Irregularly irregular, metal click, 2/6M  Firm, +T but no G or R, +D, BS+  Trace LE edema  No clubbing  No asterixis  Moves all extremities   Mood and affect are normal    Scheduled meds:    atorvastatin, 40 mg, Oral, Daily  dextrose, 25 g, Intravenous, Once  digoxin, 125 mcg, Oral, Daily  ferrous sulfate, 325 mg, Oral, Daily With Breakfast  furosemide, 40 mg, Oral, Daily  guaiFENesin, 600 mg, Oral, Q12H  insulin lispro, 0-9 Units, Subcutaneous, 4x Daily With Meals & Nightly  magnesium oxide, 400 mg, Oral, BID  metoprolol  succinate XL, 25 mg, Oral, Daily  pantoprazole, 40 mg, Intravenous, Q12H  sodium bicarbonate, 1,950 mg, Oral, BID      IV meds:                        heparin (porcine), 12 Units/kg/hr, Last Rate: 10 Units/kg/hr (11/11/21 2130)  hold, 1 each  sodium chloride, 30 mL/hr, Last Rate: Stopped (11/11/21 1928)        Data Review:    Results from last 7 days   Lab Units 11/12/21  0354 11/11/21  0512 11/10/21  0618   SODIUM mmol/L 142 140 143   POTASSIUM mmol/L 3.7 3.6 4.0   CHLORIDE mmol/L 109* 107 111*   CO2 mmol/L 24.5 23.5 22.6   BUN mg/dL 19 20 21   CREATININE mg/dL 1.04 1.20 1.05   CALCIUM mg/dL 8.0* 8.2* 8.1*   GLUCOSE mg/dL 123* 140* 117*     Estimated Creatinine Clearance: 62.1 mL/min (by C-G formula based on SCr of 1.04 mg/dL).  Results from last 7 days   Lab Units 11/05/21  1808   SODIUM UR mmol/L 26   CREATININE UR mg/dL 91.0     Results from last 7 days   Lab Units 11/12/21  0354 11/11/21  0512 11/10/21  0618   PHOSPHORUS mg/dL 2.7 2.5 2.8       Results from last 7 days   Lab Units 11/12/21 0354 11/11/21  0512 11/10/21  0618 11/09/21  0603 11/08/21  1737 11/08/21  0427 11/08/21  0427   WBC 10*3/mm3 5.94 6.08 7.25 7.19  --   --  7.23   HEMOGLOBIN g/dL 8.7* 8.6* 9.1* 8.4* 9.5*   < > 8.4*   PLATELETS 10*3/mm3 233 234 244 219  --   --  195    < > = values in this interval not displayed.       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 BYRON on CKD3, non-oliguric, creatinine lower than baseline  Etiology is secondary to prerenal state/ATN  due to hypovolemia, blood loss, hypotension, and recent contrast exposure on 11/2/21.  Bladder scans are normal.   Creatinine is lower than baseline probably due to decreased oral intake and mild hypervolemia.  2. Acute GI bleed with bloody stools noted this admission and tagged RBC scan showing bleed in the RLQ consistent with distal ileum or proximal colon.  Colonoscopy performed with AVM that was cauterized on IV heparin  3. Acute blood loss anemia related to GI bleed. He has received 6 units PRBCs this admission.  Hemoglobin has been stable  4. History of mechanical AVR and Atrial Fibrillation anticoagulated with warfarin. His warfarin is being held and IV heparin drip infusing  5. Hypertension, controlled  6. Recurrent right pleural effusion; transudate per last thoracentesis. Repeat thoracentesis performed on home Lasix of 40 mg daily.  Awaiting fluid analysis  7. Type II diabetes. Hgb A1c 5.2 last admission.  8. History of CVA with left side hemiparesis.  Mild hypophosphatemia.  Likely secondary to decreased oral intake.  No indication for replacement this time.        PLAN:  1. Restrict to 40 mg in the morning and 20 in the evening.  We will check orthostatic blood pressure.  2. Await pleural fluid analysis.    3. Surveillance labs  4. We will Continue to monitor for side effects of diuretics    Discussed with wife and nursing staff    Laz Fernandes MD  11/12/2021  11:03 EST

## 2021-11-12 NOTE — MBS/VFSS/FEES
Acute Care - Speech Language Pathology   Swallow Initial Evaluation [facility]     Patient Name: Francisco Sequeira  : 1935  MRN: [id number]  Today's Date: 2021               Admit Date: 2021    Visit Dx:     ICD-10-CM ICD-9-CM   1. Lower GI bleed  K92.2 578.9   2. Hypoglycemia  E16.2 251.2   3. On warfarin therapy  Z79.01 V58.61   4. 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)   • Iron deficiency anemia   • Chronic anticoagulation   • Lower GI bleed   • History of nephrectomy   • Abnormal urinalysis   • Recurrent right pleural effusion   • Hemiparesis of left nondominant side as late effect of cerebral infarction (HCC)   • Rectus sheath hematoma   • Acute posthemorrhagic anemia   • Angiodysplasia of colon without hemorrhage     Past Medical History:   Diagnosis Date   • Allergic rhinitis    • Aortic valve insufficiency    • Ascending aortic aneurysm (HCC)    • Atrial fibrillation (HCC)    • Bacteremia    • Calcific aortic stenosis of bicuspid valve    • Cardiac arrest (HCC)    • Cardiomyopathy (HCC)    • CKD (chronic kidney disease) stage 3, GFR 30-59 ml/min (HCC)    • Contact dermatitis due to poison ivy    • Elbow fracture    • Esophageal reflux    • GERD (gastroesophageal reflux disease)    • Head injury    • History of transfusion    • Hyperglycemia    • Hyperlipidemia    • Hypertension    • Kidney carcinoma (HCC)    • Nonischemic  cardiomyopathy (HCC)    • Renal oncocytoma    • Seasonal allergic reaction    • Sinusitis    • Syncope    • Type 2 diabetes mellitus (HCC)     uncontrolled   • Visual field defect      Past Surgical History:   Procedure Laterality Date   • AORTIC VALVE REPAIR/REPLACEMENT     • ASCENDING AORTIC ANEURYSM REPAIR W/ MECHANICAL AORTIC VALVE REPLACEMENT     • COLONOSCOPY N/A 10/28/2021    Procedure: COLONOSCOPY to cecum:  cold snare polyps,;  Surgeon: Dinesh Meza MD;  Location:  [facility];  Service: Gastroenterology;  Laterality: N/A;  pre:  Iron deficiency anemia  post:  polyps, diverticulosis,    • COLONOSCOPY N/A 11/9/2021    Procedure: COLONOSCOPY to cecum with APC cautery of AVM and clip placement x1;  Surgeon: Pavan Rodriguez MD;  Location:  [facility];  Service: Gastroenterology;  Laterality: N/A;  PRE - gi bleed, anemia  POST - diverticulosis, poor prep, AVM right colon   • ENDOSCOPY N/A 10/28/2021    Procedure: ESOPHAGOGASTRODUODENOSCOPY with biopsies;  Surgeon: Dinesh Meza MD;  [facility];  Service: Gastroenterology;  Laterality: N/A;  pre:  Iron deficiency anemia  post:  duodenitis and gastritis   • EYE SURGERY  12/09/2020    cataract surgery    • NEPHRECTOMY     • OTHER SURGICAL HISTORY      elbow surgery   • OTHER SURGICAL HISTORY      right arm surgery   • PROSTATE SURGERY     • THORACENTESIS Left     diagnostic       SLP Recommendation and Plan  SLP Swallowing Diagnosis: severe, oral dysphagia, esophageal dysphagia (11/12/21 1045)  SLP Diet Recommendation: mechanical soft with no mixed consistencies, thin liquids (11/12/21 1045)  Recommended Precautions and Strategies: upright posture during/after eating, small bites of food and sips of liquid, no straw, multiple swallows per bite of food, multiple swallows per sip of liquid, volitional throat clear (11/12/21 1045)  SLP Rec. for Method of Medication Administration: meds whole, meds crushed, with thin  "liquids, with pudding or applesauce, as tolerated (11/12/21 1045)     Monitor for Signs of Aspiration: yes, notify SLP if any concerns, other (see comments) (11/12/21 1045)  Recommended Diagnostics: VFSS ([facility]) (11/12/21 1045)  Swallow Criteria for Skilled Therapeutic Interventions Met: demonstrates skilled criteria (11/12/21 1045)  Anticipated Discharge Disposition (SLP): home with assist (11/12/21 1045)  Rehab Potential/Prognosis, Swallowing: good, to achieve stated therapy goals (11/12/21 1045)  Therapy Frequency (Swallow): PRN (11/12/21 1045)  Predicted Duration Therapy Intervention (Days): until discharge (11/12/21 1045)                         Plan of Care Reviewed With: patient  Outcome Summary: VFSS completed. GI cleared barium use. Hard solids not tested d/t risk of bleeding. Pt exhibited trace aspiration with all consistencies. A diet modification would not elimiante the patient's risk for aspiration at this time. SLP recs dysphagia treatment. Continue thins and allow mech soft, no mixed, when appropriate per MD.      SWALLOW EVALUATION (last 72 hours)     SLP Adult Swallow Evaluation     Row Name 11/12/21 1045 11/11/21 1400                Rehab Evaluation    Document Type evaluation  -SH --       Subjective Information -- no complaints  -ML       Patient Observations -- alert; cooperative  -ML       Care Plan Review -- evaluation/treatment results reviewed  -ML       Patient Effort excellent  -SH good  -ML       Symptoms Noted During/After Treatment -- none  -ML                General Information    Patient Profile Reviewed yes  -SH yes  -ML       Pertinent History Of Current Problem \"84 yo gentleman with h/o DM2, AFib, Mech AVR (on warfarin), HTN, CKD3, h/o RCC s/p right nephrectomy and h/o CVA with mild residual left hemiparesis, admitted here from 10/23 to 10/30 for \"obscure\" GI bleed and bilateral pleural effusions (transudate), who presented to ER with c/o rectal bleeding and generalized weakness. \"  " - reconsulted this date due to continued reported difficulty with swallowing per RN. Wife says this is baseline. On last date seen, SLP recommended VFSS but pt unable to have barium at that time. Pt was progressed to full liquid diet beofre being made NPO  per RN per GI.  -ML       Current Method of Nutrition thin liquids  - NPO  -ML       Precautions/Limitations, Vision WFL; for purposes of eval  - --       Precautions/Limitations, Hearing WFL; for purposes of eval  -SH --       Prior Level of Function-Communication WFL  - --       Prior Level of Function-Swallowing no diet consistency restrictions  - --       Plans/Goals Discussed with patient; agreed upon  - --       Barriers to Rehab medically complex  - --       Patient's Goals for Discharge eat/drink without coughing/choking  - --                Oral Motor Structure and Function    Dentition Assessment natural, present and adequate  - natural, present and adequate  -ML                Oral Musculature and Cranial Nerve Assessment    Oral Motor General Assessment -- generalized oral motor weakness  -ML                General Eating/Swallowing Observations    Respiratory Support Currently in Use -- room air  -ML       Eating/Swallowing Skills -- fed by SLP; self-fed  -ML       Positioning During Eating -- upright 90 degree  -ML       Utensils Used -- spoon; cup  -ML       Consistencies Trialed -- pureed; ice chips; thin liquids  -ML                Clinical Swallow Eval    Clinical Swallow Evaluation Summary -- Re-evaluation completed during trials of ice chips, single drinks of thin via cup, and puree. Oral phase appeared functional for limited consistencies. Pt noted to swallow 3-4 times with each trial of thin liquids (some swallows appeared piecemeal and then consecutive swallows). Each swallow of puree and thin liquids was audible suggesting possible impaired pharyngeal contraction and pressure issues. Pt also noted to burp after each  swallow suggesting possible esophageal issues. Pt exhibited no overt s/s of penetration/aspiration during consistencies assesed. Recommend to continue full liquid diet. Would recommend VFSS to furhter evaluate swallow if pt allowed to have barium now. If not, would recommend FEES. Discussed with pt's wife and pt and in agreement. RN to check to see of pt able to have barium. Meds whole or crushed in puree at this time.  -ML                MBS/VFSS Interpretation    VFSS Summary Patient presents with severe oropharyngeal dysphagia characterized by reduced hyolaryngeal elevation/excursion, prominent CP and incomplete UES opening, and ineffective response to penetration/aspiration. Non transient penetration during the swallow with nectar via cup. Only transient penetration demonstrated with initial trial of nectar via straw. Non transient penetration during the swallow with thins via cup with trace aspiration after the swallow d/t spillover from the valleculae and pyriforms. Non transient penetration during the swallow with puree. Moderate pharyngeal residue post swallow. Patient cleared to mild-mod with cued hard swallow. Rotary mastication with mech soft. Non transient penetration during the swallow with eventual trace aspiration with thin portion of soft solids. Non transient penetration to the vocal cords with nectar via straw at the end of the study. Trace aspiration occured with nectar via cup at the end of the study as well. Honey trials were not tested d/t current residue with puree and nectar and attempts to reduce the amount of barium ingested.  - --                SLP Communication to Radiology    Summary Statement Patient presents with severe oropharyngeal dysphagia. Trace aspiration of nectar, thins, and thin portion of soft solids. Non transient penetration with puree.  - --                Clinical Impression    SLP Swallowing Diagnosis severe; oral dysphagia; esophageal dysphagia  - suspected  pharyngeal dysphagia; esophageal dysphagia  -       Functional Impact risk of aspiration/pneumonia  - risk of aspiration/pneumonia  -ML       Rehab Potential/Prognosis, Swallowing good, to achieve stated therapy goals  - good, to achieve stated therapy goals  -       Swallow Criteria for Skilled Therapeutic Interventions Met demonstrates skilled criteria  - demonstrates skilled criteria  -                Recommendations    Therapy Frequency (Swallow) PRN  -SH PRN  -ML       Predicted Duration Therapy Intervention (Days) until discharge  -SH until discharge  -ML       SLP Diet Recommendation mechanical soft with no mixed consistencies; thin liquids  -SH full liquid diet  per team  -ML       Recommended Diagnostics VFSS (MBS)  - VFSS (MBS); FEES  -ML       Recommended Precautions and Strategies upright posture during/after eating; small bites of food and sips of liquid; no straw; multiple swallows per bite of food; multiple swallows per sip of liquid; volitional throat clear  - upright posture during/after eating; small bites of food and sips of liquid; no straw  -ML       Oral Care Recommendations Oral Care BID/PRN  -SH Oral Care BID/PRN  -ML       SLP Rec. for Method of Medication Administration meds whole; meds crushed; with thin liquids; with pudding or applesauce; as tolerated  - meds crushed; with pudding or applesauce  -       Monitor for Signs of Aspiration yes; notify SLP if any concerns; other (see comments)  - yes; notify SLP if any concerns; other (see comments)  -       Anticipated Discharge Disposition (SLP) home with assist  - --                Swallow Goals (SLP)    Oral Nutrition/Hydration Goal Selection (SLP) -- --  goals to be established after instrumental  -ML       Pharyngeal Strengthening Exercise Goal Selection (SLP) pharyngeal strengthening exercise, SLP goal 1  - --       Additional Documentation pharyngeal strengthening exercise goal selection (SLP)  - --                 Pharyngeal Strengthening Exercise Goal 1 (SLP)    Activity (Pharyngeal Strengthening Goal 1, SLP) increase squeeze/positive pressure generation  -SH --       Increase Squeeze/Positive Pressure Generation EMST  -SH --       Wilcox/Accuracy (Pharyngeal Strengthening Goal 1, SLP) with maximum cues (25-49% accuracy)  -SH --             User Key  (r) = Recorded By, (t) = Taken By, (c) = Cosigned By    Initials Name Effective Dates    Tatiana Tanner MS CCC-SLP 06/16/21 -     ML Kathe Medina MS CCC-SLP 06/16/21 -                 EDUCATION  The patient has been educated in the following areas:   Dysphagia (Swallowing Impairment).        SLP GOALS     Row Name 11/12/21 1045             Pharyngeal Strengthening Exercise Goal 1 (SLP)    Activity (Pharyngeal Strengthening Goal 1, SLP) increase squeeze/positive pressure generation  -SH      Increase Squeeze/Positive Pressure Generation EMST  -SH      Wilcox/Accuracy (Pharyngeal Strengthening Goal 1, SLP) with maximum cues (25-49% accuracy)  -SH            User Key  (r) = Recorded By, (t) = Taken By, (c) = Cosigned By    Initials Name Provider Type     Tatiana Salcido MS CCC-SLP Speech and Language Pathologist              SLP Outcome Measures (last 72 hours)     SLP Outcome Measures     Row Name 11/12/21 1300 11/11/21 1400          SLP Outcome Measures    Outcome Measure Used? Adult NOMS  -SH Adult NOMS  -ML            Adult FCM Scores    FCM Chosen Swallowing  -SH Swallowing  -ML     Swallowing FCM Score 2  -SH 4  -ML           User Key  (r) = Recorded By, (t) = Taken By, (c) = Cosigned By    Initials Name Effective Dates    Tatiana Tanner MS CCC-SLP 06/16/21 -     ML Kathe Medina MS CCC-SLP 06/16/21 -                  Time Calculation:    Time Calculation- SLP     Row Name 11/12/21 1359             Time Calculation- SLP    SLP Start Time 1045  -SH      SLP Received On 11/12/21  -              Untimed Charges    18191-DM Motion  Fluoro Eval Swallow Minutes 75  -SH              Total Minutes    Untimed Charges Total Minutes 75  -SH       Total Minutes 75  -SH            User Key  (r) = Recorded By, (t) = Taken By, (c) = Cosigned By    Initials Name Provider Type    Tatiana Tanner MS CCC-SLP Speech and Language Pathologist                Therapy Charges for Today     Code Description Service Date Service Provider Modifiers Qty    [id number] HC ST MOTION FLUORO EVAL SWALLOW 5 11/12/2021 Tatiana Salcido MS CCC-SLP GN 1               Tatiana Salcido MS CCC-SLP  11/12/2021

## 2021-11-12 NOTE — PROGRESS NOTES
Name: Francisco Sequeira ADMIT: 2021   : 1935  PCP: Rubin Hinkle APRN    MRN: [id number] LOS: 10 days   AGE/SEX: 85 y.o. male  ROOM: E459/     Subjective   Subjective    No new issues overnight.      Review of Systems   Constitutional: Positive for fatigue.   Gastrointestinal: Negative for abdominal pain, blood in stool and diarrhea.        Objective   Objective   Vital Signs  Temp:  [97.7 °F (36.5 °C)-98.5 °F (36.9 °C)] 97.7 °F (36.5 °C)  Heart Rate:  [76-96] 77  Resp:  [16-18] 16  BP: (105-124)/(57-83) 112/57  SpO2:  [95 %-98 %] 98 %  on   ;   Device (Oxygen Therapy): room air  Body mass index is 24.41 kg/m².  Physical Exam  Vitals and nursing note reviewed.   Constitutional:       General: He is not in acute distress.     Appearance: He is ill-appearing. He is not toxic-appearing or diaphoretic.   HENT:      Head: Normocephalic and atraumatic.   Eyes:      General: No scleral icterus.     Extraocular Movements: Extraocular movements intact.      Conjunctiva/sclera: Conjunctivae normal.   Cardiovascular:      Rate and Rhythm: Normal rate. Rhythm irregularly irregular.      Pulses: Normal pulses.      Comments: Mechanical s2  Pulmonary:      Effort: Pulmonary effort is normal. No respiratory distress.      Breath sounds: Normal breath sounds. No wheezing or rales.      Comments: Decreased BS right base  Abdominal:      General: Bowel sounds are normal. There is no distension.      Palpations: Abdomen is soft.      Tenderness: There is abdominal tenderness (RLQ). There is guarding (voluntary). There is no rebound.   Musculoskeletal:         General: No swelling or deformity. Normal range of motion.      Cervical back: Neck supple. No rigidity.   Lymphadenopathy:      Cervical: No cervical adenopathy.   Skin:     General: Skin is warm and dry.      Capillary Refill: Capillary refill takes less than 2 seconds.      Coloration: Skin is pale.      Findings: Bruising (Right abdomen wall tracking  superiorly down to groin and back) present.   Neurological:      Mental Status: He is alert and oriented to person, place, and time. Mental status is at baseline.      Motor: Weakness (very mild chronic left weakness, at baseline) present.   Psychiatric:         Mood and Affect: Mood normal.         Behavior: Behavior normal.       Results Review     I reviewed the patient's new clinical results.  Results from last 7 days   Lab Units 11/12/21  0354 11/11/21  0512 11/10/21  0618 11/09/21  0603   WBC 10*3/mm3 5.94 6.08 7.25 7.19   HEMOGLOBIN g/dL 8.7* 8.6* 9.1* 8.4*   PLATELETS 10*3/mm3 233 234 244 219     Results from last 7 days   Lab Units 11/12/21  0354 11/11/21  0512 11/10/21  0618 11/09/21  1109   SODIUM mmol/L 142 140 143 145   POTASSIUM mmol/L 3.7 3.6 4.0 4.2   CHLORIDE mmol/L 109* 107 111* 113*   CO2 mmol/L 24.5 23.5 22.6 21.5*   BUN mg/dL 19 20 21 25*   CREATININE mg/dL 1.04 1.20 1.05 1.37*   GLUCOSE mg/dL 123* 140* 117* 134*   EGFR IF NONAFRICN AM mL/min/1.73 68 58* 67 50*     Results from last 7 days   Lab Units 11/12/21  0354 11/11/21  0512 11/10/21  0618 11/09/21  1109   ALBUMIN g/dL 2.60* 2.60* 2.60* 2.60*     Results from last 7 days   Lab Units 11/12/21  0354 11/11/21  0512 11/10/21  0618 11/09/21  1109   CALCIUM mg/dL 8.0* 8.2* 8.1* 7.9*   ALBUMIN g/dL 2.60* 2.60* 2.60* 2.60*   PHOSPHORUS mg/dL 2.7 2.5 2.8 2.9     Results from last 7 days   Lab Units 11/12/21  0354 11/11/21  0512 11/10/21  0618 11/09/21  0603 11/08/21  0427 11/07/21  0537 11/06/21  0550   PROTIME Seconds 17.5* 18.6* 20.0* 22.5* 21.5* 22.3* 22.6*   INR  1.46* 1.57* 1.73* 2.00* 1.89* 1.98* 2.02*     Glucose   Date/Time Value Ref Range Status   11/12/2021 1144 210 (H) 70 - 130 mg/dL Final     Comment:     Meter: [id number] : [id number] Asher Vy NA   11/12/2021 0616 136 (H) 70 - 130 mg/dL Final     Comment:     Meter: [id number] : [id number] He Malini NA   11/11/2021 2032 189 (H) 70 - 130 mg/dL Final     Comment:      Meter: [id number] : [id number] Cutler Malini NA   11/11/2021 1651 165 (H) 70 - 130 mg/dL Final     Comment:     Meter: [id number] : [id number] [place]knk[place] Naomi NA   11/11/2021 1140 141 (H) 70 - 130 mg/dL Final     Comment:     Meter: [id number] : [id number] Goodknight Naomi NA   11/11/2021 0616 133 (H) 70 - 130 mg/dL Final     Comment:     Meter: [id number] : [id number] Gomez Bertha NA   11/10/2021 2113 165 (H) 70 - 130 mg/dL Final     Comment:     Meter: [id number] : [id number] Gomez Bertha NA       COLONOSCOPY  11/9  Impression:  - Preparation of the colon was poor.  - Diverticulosis in the sigmoid colon, in the descending colon and in the ascending colon.  - A single colonic angiodysplastic lesion. Treated with argon plasma coagulation (APC). Clip was placed.  - The examination was otherwise normal on direct and retroflexion views.  - No specimens collected.    CT Abdomen Pelvis Without Contrast  11/4  CONCLUSION:  1. Right-sided pleural effusion appears slightly larger compared to the  previous examination, new small left-sided pleural effusion has  developed. There is diffuse body wall edema and cardiac enlargement.  Small amount of free fluid is demonstrated within the peritoneal cavity.  2. Sizable bilobed collection has developed within the right rectus  abdominis muscle, consistent with hematoma. See above dimensions.  3. Diverticulosis of the colon, subtle pericolonic induration about the  mid sigmoid suggests possible diverticulitis.  4. Urine demonstrates increased density, perhaps related to residual  contrast from previous CT or hemorrhage. Correlation with urinalysis  advised.  5. Gallstones.        Scheduled Medications  atorvastatin, 40 mg, Oral, Daily  dextrose, 25 g, Intravenous, Once  digoxin, 125 mcg, Oral, Daily  ferrous sulfate, 325 mg, Oral, Daily With Breakfast  furosemide, 20 mg, Oral, Daily  furosemide, 40 mg, Oral, Daily  guaiFENesin, 600 mg, Oral, Q12H  insulin lispro, 0-9  "Units, Subcutaneous, 4x Daily With Meals & Nightly  magnesium oxide, 400 mg, Oral, BID  metoprolol succinate XL, 25 mg, Oral, Daily  pantoprazole, 40 mg, Intravenous, Q12H  sodium bicarbonate, 1,950 mg, Oral, BID    Infusions  heparin (porcine), 12 Units/kg/hr, Last Rate: 10 Units/kg/hr (11/11/21 2130)  hold, 1 each  sodium chloride, 30 mL/hr, Last Rate: Stopped (11/11/21 1928)    Diet  Diet Dysphagia; IV - Mechanical Soft No Mixed Consistencies; Consistent Carbohydrate       Assessment/Plan     Active Hospital Problems    Diagnosis  POA   • **Lower GI bleed [K92.2]  Yes   • Angiodysplasia of colon without hemorrhage [K55.20]  Unknown   • Rectus sheath hematoma [S30.1XXA]  No   • Acute posthemorrhagic anemia [D62]  Yes   • History of nephrectomy [Z90.5]  Not Applicable   • Abnormal urinalysis [R82.90]  Yes   • Recurrent right pleural effusion [J90]  Yes   • 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       86 yo gentleman with h/o DM2, [facility]ib[facility] HTN, CKD3, h/o RCC s/p right nephrectomy and h/o CVA with mild residual left hemiparesis, admitted here from 10/23 to 10/30 for \"obscure\" GI bleed and bilateral pleural effusions (transudate), who presented to ER with c/o rectal bleeding and generalized weakness. Found to have drop in Hgb from 9.3 to 7.2. Stool grossly bloody in ER.  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      On heparin drip since yesterday with no " evidence of bleeding.  Hemoglobin stable.    Restart warfarin?  Defer to cardiology and GI.    Video swallow done  Physical therapy consulted      SCDs for DVT prophylaxis.  Now on heparin drip.  Full code.  Discussed with patient, family, nursing staff and ST.  Anticipate discharge home with  vs SNU facility next week.      Santos Guzman MD  [facility]  11/12/21  14:49 EST

## 2021-11-12 NOTE — CASE MANAGEMENT/SOCIAL WORK
Continued Stay Note  [facility]     Patient Name: Francisco Sequeira  MRN: [id number]  Today's Date: 11/12/2021    Admit Date: 11/2/2021     Discharge Plan     Row Name 11/12/21 1608       Plan    Plan Home with spouse and continue services with [facility] . Family to transport PT eval pending    Patient/Family in Agreement with Plan yes    Plan Comments Currently on Heparin drip with no bleeding today. Will need to be transitioned to Coumadin. PT ordered with eavl pending. Aquilino Proctor RN-BC               Discharge Codes    No documentation.               Expected Discharge Date and Time     Expected Discharge Date Expected Discharge Time    Nov 15, 2021             Aquilino Proctor RN

## 2021-11-12 NOTE — PLAN OF CARE
Problem: Adult Inpatient Plan of Care  Goal: Plan of Care Review  Outcome: Ongoing, Progressing  Flowsheets (Taken 11/12/2021 5877)  Progress: no change  Plan of Care Reviewed With: patient  Outcome Summary: A&Ox4, VSS, no complaints of pain or discomfort, heparin gtt infusing and therapeutic this am, afib on monitor, encouraged to turn frequently, will continue to monitor.

## 2021-11-13 LAB
ALBUMIN SERPL-MCNC: 2.4 G/DL (ref 3.5–5.2)
ANION GAP SERPL CALCULATED.3IONS-SCNC: 6.6 MMOL/L (ref 5–15)
APTT PPP: 74.5 SECONDS (ref 22.7–35.4)
BASOPHILS # BLD AUTO: 0.03 10*3/MM3 (ref 0–0.2)
BASOPHILS NFR BLD AUTO: 0.6 % (ref 0–1.5)
BUN SERPL-MCNC: 18 MG/DL (ref 8–23)
BUN/CREAT SERPL: 15 (ref 7–25)
CALCIUM SPEC-SCNC: 8.1 MG/DL (ref 8.6–10.5)
CHLORIDE SERPL-SCNC: 106 MMOL/L (ref 98–107)
CO2 SERPL-SCNC: 27.4 MMOL/L (ref 22–29)
CREAT SERPL-MCNC: 1.2 MG/DL (ref 0.76–1.27)
DEPRECATED RDW RBC AUTO: 59.3 FL (ref 37–54)
EOSINOPHIL # BLD AUTO: 0.31 10*3/MM3 (ref 0–0.4)
EOSINOPHIL NFR BLD AUTO: 5.9 % (ref 0.3–6.2)
ERYTHROCYTE [DISTWIDTH] IN BLOOD BY AUTOMATED COUNT: 20 % (ref 12.3–15.4)
GFR SERPL CREATININE-BSD FRML MDRD: 58 ML/MIN/1.73
GLUCOSE BLDC GLUCOMTR-MCNC: 143 MG/DL (ref 70–130)
GLUCOSE BLDC GLUCOMTR-MCNC: 183 MG/DL (ref 70–130)
GLUCOSE BLDC GLUCOMTR-MCNC: 206 MG/DL (ref 70–130)
GLUCOSE BLDC GLUCOMTR-MCNC: 231 MG/DL (ref 70–130)
GLUCOSE SERPL-MCNC: 136 MG/DL (ref 65–99)
HCT VFR BLD AUTO: 29.6 % (ref 37.5–51)
HGB BLD-MCNC: 9.3 G/DL (ref 13–17.7)
IMM GRANULOCYTES # BLD AUTO: 0.02 10*3/MM3 (ref 0–0.05)
IMM GRANULOCYTES NFR BLD AUTO: 0.4 % (ref 0–0.5)
INR PPP: 1.27 (ref 0.9–1.1)
LYMPHOCYTES # BLD AUTO: 0.88 10*3/MM3 (ref 0.7–3.1)
LYMPHOCYTES NFR BLD AUTO: 16.7 % (ref 19.6–45.3)
MCH RBC QN AUTO: 25.4 PG (ref 26.6–33)
MCHC RBC AUTO-ENTMCNC: 31.4 G/DL (ref 31.5–35.7)
MCV RBC AUTO: 80.9 FL (ref 79–97)
MONOCYTES # BLD AUTO: 0.45 10*3/MM3 (ref 0.1–0.9)
MONOCYTES NFR BLD AUTO: 8.5 % (ref 5–12)
NEUTROPHILS NFR BLD AUTO: 3.59 10*3/MM3 (ref 1.7–7)
NEUTROPHILS NFR BLD AUTO: 67.9 % (ref 42.7–76)
NRBC BLD AUTO-RTO: 0.2 /100 WBC (ref 0–0.2)
PHOSPHATE SERPL-MCNC: 3 MG/DL (ref 2.5–4.5)
PLATELET # BLD AUTO: 224 10*3/MM3 (ref 140–450)
PMV BLD AUTO: 11 FL (ref 6–12)
POTASSIUM SERPL-SCNC: 4 MMOL/L (ref 3.5–5.2)
PROTHROMBIN TIME: 15.6 SECONDS (ref 11.7–14.2)
RBC # BLD AUTO: 3.66 10*6/MM3 (ref 4.14–5.8)
SODIUM SERPL-SCNC: 140 MMOL/L (ref 136–145)
WBC # BLD AUTO: 5.28 10*3/MM3 (ref 3.4–10.8)

## 2021-11-13 PROCEDURE — 82962 GLUCOSE BLOOD TEST: CPT

## 2021-11-13 PROCEDURE — 80069 RENAL FUNCTION PANEL: CPT | Performed by: INTERNAL MEDICINE

## 2021-11-13 PROCEDURE — 36415 COLL VENOUS BLD VENIPUNCTURE: CPT | Performed by: NURSE PRACTITIONER

## 2021-11-13 PROCEDURE — 85025 COMPLETE CBC W/AUTO DIFF WBC: CPT | Performed by: NURSE PRACTITIONER

## 2021-11-13 PROCEDURE — 99232 SBSQ HOSP IP/OBS MODERATE 35: CPT | Performed by: INTERNAL MEDICINE

## 2021-11-13 PROCEDURE — 63710000001 INSULIN LISPRO (HUMAN) PER 5 UNITS: Performed by: INTERNAL MEDICINE

## 2021-11-13 PROCEDURE — 97110 THERAPEUTIC EXERCISES: CPT

## 2021-11-13 PROCEDURE — 85610 PROTHROMBIN TIME: CPT | Performed by: INTERNAL MEDICINE

## 2021-11-13 PROCEDURE — 85730 THROMBOPLASTIN TIME PARTIAL: CPT | Performed by: NURSE PRACTITIONER

## 2021-11-13 PROCEDURE — 25010000002 HEPARIN (PORCINE) PER 1000 UNITS: Performed by: NURSE PRACTITIONER

## 2021-11-13 RX ORDER — FUROSEMIDE 40 MG/1
40 TABLET ORAL
Status: DISCONTINUED | OUTPATIENT
Start: 2021-11-13 | End: 2021-11-15

## 2021-11-13 RX ORDER — SODIUM BICARBONATE 650 MG/1
650 TABLET ORAL 2 TIMES DAILY
Status: DISCONTINUED | OUTPATIENT
Start: 2021-11-14 | End: 2021-11-16 | Stop reason: HOSPADM

## 2021-11-13 RX ORDER — WARFARIN SODIUM 7.5 MG/1
7.5 TABLET ORAL
Status: COMPLETED | OUTPATIENT
Start: 2021-11-13 | End: 2021-11-13

## 2021-11-13 RX ADMIN — ATORVASTATIN CALCIUM 40 MG: 20 TABLET, FILM COATED ORAL at 09:44

## 2021-11-13 RX ADMIN — SODIUM BICARBONATE 1950 MG: 650 TABLET ORAL at 09:43

## 2021-11-13 RX ADMIN — DIGOXIN 125 MCG: 250 TABLET ORAL at 09:43

## 2021-11-13 RX ADMIN — INSULIN LISPRO 4 UNITS: 100 INJECTION, SOLUTION INTRAVENOUS; SUBCUTANEOUS at 17:42

## 2021-11-13 RX ADMIN — GUAIFENESIN 600 MG: 600 TABLET, EXTENDED RELEASE ORAL at 09:43

## 2021-11-13 RX ADMIN — FUROSEMIDE 40 MG: 40 TABLET ORAL at 17:42

## 2021-11-13 RX ADMIN — GUAIFENESIN 600 MG: 600 TABLET, EXTENDED RELEASE ORAL at 20:09

## 2021-11-13 RX ADMIN — MAGNESIUM OXIDE 400 MG (241.3 MG MAGNESIUM) TABLET 400 MG: TABLET at 09:43

## 2021-11-13 RX ADMIN — HEPARIN SODIUM 10 UNITS/KG/HR: 10000 INJECTION, SOLUTION INTRAVENOUS at 22:23

## 2021-11-13 RX ADMIN — PANTOPRAZOLE SODIUM 40 MG: 40 INJECTION, POWDER, FOR SOLUTION INTRAVENOUS at 09:43

## 2021-11-13 RX ADMIN — PANTOPRAZOLE SODIUM 40 MG: 40 INJECTION, POWDER, FOR SOLUTION INTRAVENOUS at 20:09

## 2021-11-13 RX ADMIN — WARFARIN 7.5 MG: 7.5 TABLET ORAL at 17:42

## 2021-11-13 RX ADMIN — INSULIN LISPRO 2 UNITS: 100 INJECTION, SOLUTION INTRAVENOUS; SUBCUTANEOUS at 20:47

## 2021-11-13 RX ADMIN — METOPROLOL SUCCINATE 25 MG: 25 TABLET, EXTENDED RELEASE ORAL at 09:43

## 2021-11-13 RX ADMIN — FERROUS SULFATE TAB 325 MG (65 MG ELEMENTAL FE) 325 MG: 325 (65 FE) TAB at 09:43

## 2021-11-13 RX ADMIN — FUROSEMIDE 40 MG: 40 TABLET ORAL at 09:43

## 2021-11-13 RX ADMIN — INSULIN LISPRO 4 UNITS: 100 INJECTION, SOLUTION INTRAVENOUS; SUBCUTANEOUS at 12:19

## 2021-11-13 RX ADMIN — MAGNESIUM OXIDE 400 MG (241.3 MG MAGNESIUM) TABLET 400 MG: TABLET at 20:09

## 2021-11-13 NOTE — PROGRESS NOTES
"      [facility]         Dr Castillo Sayied   LOS: 11 days   Patient Care Team:  Rubin Hinkle APRN as PCP - General (Family Medicine)  Audra Vazquez RODDY as Pharmacist  Vasquez Niño PharmD as Pharmacist (Pharmacy)    Chief Complaint: Bilateral pleural effusion right rater than left with acute GI bleed lower on anticoagulation for mechanical aortic valve    Interval History: Events noted chart reviewed.  Resting comfortably.  Status post thoracentesis 2 days ago 1.6 L removed.  Does not feel his breathing is any improved.    REVIEW OF SYSTEMS:   CARDIOVASCULAR: No chest pain, chest pressure or chest discomfort. No palpitations or edema.   RESPIRATORY: Short of breath with activity  GASTROINTESTINAL: No anorexia, nausea, vomiting or diarrhea. No abdominal pain or blood.   HEMATOLOGIC: No bleeding or bruising.     Ventilator/Non-Invasive Ventilation Settings (From admission, onward)            None            Vital Signs  Temp:  [97.7 °F (36.5 °C)-98.3 °F (36.8 °C)] 97.7 °F (36.5 °C)  Heart Rate:  [77-84] 81  Resp:  [16-18] 18  BP: (116-131)/(64-82) 126/69    Intake/Output Summary (Last 24 hours) at 11/13/2021 1315  Last data filed at 11/13/2021 1126  Gross per 24 hour   Intake 210 ml   Output 550 ml   Net -340 ml     Flowsheet Rows      First Filed Value   Admission Height 182.9 cm (72\") Documented at 11/02/2021 0344   Admission Weight 81.6 kg (180 lb) Documented at 11/02/2021 0344          Physical Exam:  Patient is examined using the personal protective equipment as per guidelines from infection control for this particular patient as enacted.  Hand hygiene was performed before and after patient interaction.   General Appearance:    Alert, cooperative, in no acute distress.  Following simple commands  Neck midline trachea, no thyromegaly   Lungs:    Diminished breath sounds bilaterally right rater than left no labored breathing    Heart:    Regular rhythm and normal rate, normal S1 and S2, no           "  murmur, no gallop, no rub, no click   Chest Wall:    No abnormalities observed   Abdomen:     Normal bowel sounds, no masses, no organomegaly, soft        nontender, nondistended, no guarding, no rebound                tenderness   Extremities:   Moves all extremities well, no edema, no cyanosis, no             redness  CNS no focal neurological deficits normal sensory exam  Skin no rashes no nodules  Musculoskeletal no cyanosis no clubbing normal range of motion     Results Review:        Results from last 7 days   Lab Units 11/13/21 0624 11/12/21 0354 11/12/21 0354 11/11/21 0512 11/11/21 0512   SODIUM mmol/L 140  --  142  --  140   POTASSIUM mmol/L 4.0  --  3.7  --  3.6   CHLORIDE mmol/L 106  --  109*  --  107   CO2 mmol/L 27.4  --  24.5  --  23.5   BUN mg/dL 18  --  19  --  20   CREATININE mg/dL 1.20  --  1.04  --  1.20   GLUCOSE mg/dL 136*   < > 123*   < > 140*   CALCIUM mg/dL 8.1*  --  8.0*  --  8.2*    < > = values in this interval not displayed.         Results from last 7 days   Lab Units 11/13/21 0624 11/12/21 0354 11/11/21 0512   WBC 10*3/mm3 5.28 5.94 6.08   HEMOGLOBIN g/dL 9.3* 8.7* 8.6*   HEMATOCRIT % 29.6* 29.2* 26.5*   PLATELETS 10*3/mm3 224 233 234     Results from last 7 days   Lab Units 11/13/21 0624 11/12/21 0354 11/11/21 1953 11/11/21  1143 11/11/21  0512   INR  1.27* 1.46*  --   --  1.57*   APTT seconds 74.5* 81.1* 95.1*   < >  --     < > = values in this interval not displayed.                       I reviewed the patient's new clinical results.  I personally viewed and interpreted the patient's chest x-ray.        Medication Review:   atorvastatin, 40 mg, Oral, Daily  dextrose, 25 g, Intravenous, Once  digoxin, 125 mcg, Oral, Daily  ferrous sulfate, 325 mg, Oral, Daily With Breakfast  furosemide, 20 mg, Oral, Daily  furosemide, 40 mg, Oral, Daily  guaiFENesin, 600 mg, Oral, Q12H  insulin lispro, 0-9 Units, Subcutaneous, 4x Daily With Meals & Nightly  magnesium oxide, 400 mg, Oral,  BID  metoprolol succinate XL, 25 mg, Oral, Daily  pantoprazole, 40 mg, Intravenous, Q12H  sodium bicarbonate, 1,950 mg, Oral, BID  warfarin, 7.5 mg, Oral, Once        heparin (porcine), 12 Units/kg/hr, Last Rate: 10 Units/kg/hr (11/12/21 1513)  hold, 1 each  Pharmacy to dose warfarin,   sodium chloride, 30 mL/hr, Last Rate: Stopped (11/11/21 1928)        ASSESSMENT:   Rt> Lt pl effusion s/p thora 11/11 - 1.6L; transudate  Acute GI bleed; lower  Previous mechanical aortic valve replacement  Pulmonary hypertension  A. fib  Anemia    PLAN:  Status post thoracentesis yesterday.  Fluid appears to be transudative likely from CHF  Continue adjustment of diuresis  Anticoagulation has been restarted currently tolerating well.  Currently on room air tolerating well  GI bleeding currently reason for patient to stay in the hospital  Monitor pleural effusion closely  Discussed with wife at bedside      Jonathan Blackmon MD  11/13/21  13:15 EST

## 2021-11-13 NOTE — PROGRESS NOTES
[facility]  Cardiology Progress Note    Patient Identification:  Name: Francisco Sequeira  Age: 85 y.o.  Sex: male  :  1935  MRN: [id number]                 Follow Up / Chief Complaint: Anticoagulation    Interval History:  No chest pains or tightness in the chest     Subjective:  No chest pains or tightness in the chest    Objective:    Past Medical History:  Past Medical History:   Diagnosis Date   • Allergic rhinitis    • Aortic valve insufficiency    • Ascending aortic aneurysm (HCC)    • Atrial fibrillation (HCC)    • Bacteremia    • Calcific aortic stenosis of bicuspid valve    • Cardiac arrest (HCC)    • Cardiomyopathy (HCC)    • CKD (chronic kidney disease) stage 3, GFR 30-59 ml/min (HCC)    • Contact dermatitis due to poison ivy    • Elbow fracture    • Esophageal reflux    • GERD (gastroesophageal reflux disease)    • Head injury    • History of transfusion    • Hyperglycemia    • Hyperlipidemia    • Hypertension    • Kidney carcinoma (HCC)    • Nonischemic cardiomyopathy (HCC)    • Renal oncocytoma    • Seasonal allergic reaction    • Sinusitis    • Syncope    • Type 2 diabetes mellitus (HCC)     uncontrolled   • Visual field defect      Past Surgical History:  Past Surgical History:   Procedure Laterality Date   • AORTIC VALVE REPAIR/REPLACEMENT     • ASCENDING AORTIC ANEURYSM REPAIR W/ MECHANICAL AORTIC VALVE REPLACEMENT     • COLONOSCOPY N/A 10/28/2021    Procedure: COLONOSCOPY to cecum:  cold snare polyps,;  Surgeon: Dinesh Meza MD;  Location: [facility] ENDOSCOPY;  Service: Gastroenterology;  Laterality: N/A;  pre:  Iron deficiency anemia  post:  polyps, diverticulosis,    • COLONOSCOPY N/A 2021    Procedure: COLONOSCOPY to cecum with APC cautery of AVM and clip placement x1;  Surgeon: Pavan Rodriguez MD;  Location: [facility] ENDOSCOPY;  Service: Gastroenterology;  Laterality: N/A;  PRE - gi bleed, anemia  POST - diverticulosis, poor prep, AVM right colon   •  ENDOSCOPY N/A 10/28/2021    Procedure: ESOPHAGOGASTRODUODENOSCOPY with biopsies;  Surgeon: Dinesh Meza MD;  Location: [facility] ENDOSCOPY;  Service: Gastroenterology;  Laterality: N/A;  pre:  Iron deficiency anemia  post:  duodenitis and gastritis   • EYE SURGERY  12/09/2020    cataract surgery    • NEPHRECTOMY     • OTHER SURGICAL HISTORY      elbow surgery   • OTHER SURGICAL HISTORY      right arm surgery   • PROSTATE SURGERY     • THORACENTESIS Left     diagnostic        Social History:   Social History     Tobacco Use   • Smoking status: Former Smoker   • Smokeless tobacco: Former User   • Tobacco comment: caffeine use   Substance Use Topics   • Alcohol use: Yes     Comment: occasional      Family History:  Family History   Problem Relation Age of Onset   • Cancer Mother         colon   • Cancer Brother         colon          Allergies:  Allergies   Allergen Reactions   • Other Other (See Comments)     Grass, ragweed   • Penicillins Swelling   • Percocet [Oxycodone-Acetaminophen] Nausea And Vomiting     Scheduled Meds:  atorvastatin, 40 mg, Daily  dextrose, 25 g, Once  digoxin, 125 mcg, Daily  ferrous sulfate, 325 mg, Daily With Breakfast  furosemide, 20 mg, Daily  furosemide, 40 mg, Daily  guaiFENesin, 600 mg, Q12H  insulin lispro, 0-9 Units, 4x Daily With Meals & Nightly  magnesium oxide, 400 mg, BID  metoprolol succinate XL, 25 mg, Daily  pantoprazole, 40 mg, Q12H  sodium bicarbonate, 1,950 mg, BID  warfarin, 7.5 mg, Once            INTAKE AND OUTPUT:    Intake/Output Summary (Last 24 hours) at 11/13/2021 1246  Last data filed at 11/13/2021 1126  Gross per 24 hour   Intake 210 ml   Output 550 ml   Net -340 ml       ROS  Constitutional: Awake and alert, no fever. No nosebleeds  Abdomen           no abdominal pain   Cardiac              no chest pain  Pulmonary          no shortness of breath      /69 (BP Location: Right arm, Patient Position: Lying)   Pulse 81   Temp 97.7 °F (36.5 °C) (Oral)    "Resp 18   Ht 182.9 cm (72\")   Wt 81.6 kg (180 lb)   SpO2 96%   BMI 24.41 kg/m²   General appearance: No acute changes   Neck: Trachea midline; NECK, supple, no thyromegaly or lymphadenopathy   Lungs: Normal size and shape, normal breath sounds, equal distribution of air, no rales or rhonchi   CV: S1-S2 regular, no murmurs, no rub, no gallop   Abdomen: Soft, nontender; no masses , no abnormal abdominal sounds   Extremities: No deformity , normal color , no peripheral edema   Skin: Normal temperature, turgor and texture; no rash, ulcers            Cardiographics  Telemetry:     ECG:     Echocardiogram:     Lab Review           Results from last 7 days   Lab Units 11/13/21  0624   SODIUM mmol/L 140   POTASSIUM mmol/L 4.0   BUN mg/dL 18   CREATININE mg/dL 1.20   CALCIUM mg/dL 8.1*     @[id number]@  Results from last 7 days   Lab Units 11/13/21  0624 11/12/21  0354 11/11/21  0512   WBC 10*3/mm3 5.28 5.94 6.08   HEMOGLOBIN g/dL 9.3* 8.7* 8.6*   HEMATOCRIT % 29.6* 29.2* 26.5*   PLATELETS 10*3/mm3 224 233 234     Results from last 7 days   Lab Units 11/13/21  0624 11/12/21  0354 11/11/21  1953 11/11/21  1143 11/11/21  0512   INR  1.27* 1.46*  --   --  1.57*   APTT seconds 74.5* 81.1* 95.1*   < >  --     < > = values in this interval not displayed.         Assessment:  Atrial fibrillation on heparin drip now  GI bleed      Plan:    Coumadin has been restarted  Continue heparin until PT/INR more than 2    )11/13/2021  Valentin Gtz MD      EMR Dragon/Transcription:   \"Dictated utilizing Dragon dictation\".     "

## 2021-11-13 NOTE — PLAN OF CARE
Problem: Adult Inpatient Plan of Care  Goal: Plan of Care Review  Outcome: Ongoing, Progressing   Goal Outcome Evaluation:   Vss.Ambulated and up to chair today.Remains on heparin gtt.No c/o pain or n/v.

## 2021-11-13 NOTE — PROGRESS NOTES
Pharmacy Consult: Warfarin Dosing/ Monitoring    Francisco Sequeira is a 85 y.o. male, estimated creatinine clearance is 53.8 mL/min (by C-G formula based on SCr of 1.2 mg/dL). weighing 81.6 kg (180 lb).     has a past medical history of Allergic rhinitis, Aortic valve insufficiency, Ascending aortic aneurysm (HCC), Atrial fibrillation (HCC), Bacteremia, Calcific aortic stenosis of bicuspid valve, Cardiac arrest (HCC), Cardiomyopathy (HCC), CKD (chronic kidney disease) stage 3, GFR 30-59 ml/min (HCC), Contact dermatitis due to poison ivy, Elbow fracture, Esophageal reflux, GERD (gastroesophageal reflux disease), Head injury, History of transfusion, Hyperglycemia, Hyperlipidemia, Hypertension, Kidney carcinoma (HCC), Nonischemic cardiomyopathy (HCC), Renal oncocytoma, Seasonal allergic reaction, Sinusitis, Syncope, Type 2 diabetes mellitus (HCC), and Visual field defect.    Social History     Tobacco Use    Smoking status: Former Smoker    Smokeless tobacco: Former User    Tobacco comment: caffeine use   Vaping Use    Vaping Use: Never used   Substance Use Topics    Alcohol use: Yes     Comment: occasional    Drug use: No       Results from last 7 days   Lab Units 11/13/21  0624 11/12/21  0354 11/11/21  1953 11/11/21  1143 11/11/21  0512 11/10/21  0618 11/09/21  0603 11/08/21  1737 11/08/21  0427 11/07/21  1351 11/07/21  0538 11/07/21  0537 11/06/21  2246   INR  1.27* 1.46*  --   --  1.57* 1.73* 2.00*  --  1.89*  --   --  1.98*  --    APTT seconds 74.5* 81.1* 95.1* 44.7*  --   --   --   --   --   --   --   --   --    HEMOGLOBIN g/dL 9.3* 8.7*  --   --  8.6* 9.1* 8.4* 9.5* 8.4*   < > 8.5*  --    < >   HEMATOCRIT % 29.6* 29.2*  --   --  26.5* 30.0* 26.3* 30.8* 26.3*   < > 27.6*  --    < >   PLATELETS 10*3/mm3 224 233  --   --  234 244 219  --  195  --  182  --   --     < > = values in this interval not displayed.     Results from last 7 days   Lab Units 11/13/21  0624 11/12/21  0354 11/11/21  0512   SODIUM mmol/L 140 142  140   POTASSIUM mmol/L 4.0 3.7 3.6   CHLORIDE mmol/L 106 109* 107   CO2 mmol/L 27.4 24.5 23.5   BUN mg/dL 18 19 20   CREATININE mg/dL 1.20 1.04 1.20   CALCIUM mg/dL 8.1* 8.0* 8.2*   GLUCOSE mg/dL 136* 123* 140*     Anticoagulation history: MWF 7.5 mg, SuTThSa 5 mg outpatient.     Hospital Anticoagulation:  Consulting provider: Dr. Coughlin  Start date: restarted 11/13.  Indication: mechanical ht valve  Target INR: 2.5-3.5  Expected duration: lifetime   Bridge Therapy: yes            and unfractionated heparin IV infusion  Date 11/13            INR 1.27            Warfarin dose               Potential drug interactions:   Acetaminophen-moderate-may increase bleeding risk  Pantoprazole-may increase INR    Relevant nutrition status: mechanical soft w/ boost supplements    Other:     Education complete?/ Date: not yet, is long term medicaiton    Assessment/Plan:  Dose 7.5 mg today  Monitor pt/inr daily  Follow up tomorrow am after reviewing INR    Pharmacy will continue to follow until discharge or discontinuation of warfarin.   Braydon Hinkle, [facility]  11/13/2021

## 2021-11-13 NOTE — THERAPY TREATMENT NOTE
Patient Name: Francisco Sequeira  : 1935    MRN: [id number]                              Today's Date: 2021       Admit Date: 2021    Visit Dx:     ICD-10-CM ICD-9-CM   1. Lower GI bleed  K92.2 578.9   2. Hypoglycemia  E16.2 251.2   3. On warfarin therapy  Z79.01 V58.61   4. 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)   • Iron deficiency anemia   • Chronic anticoagulation   • Lower GI bleed   • History of nephrectomy   • Abnormal urinalysis   • Recurrent right pleural effusion   • Hemiparesis of left nondominant side as late effect of cerebral infarction (HCC)   • Rectus sheath hematoma   • Acute posthemorrhagic anemia   • Angiodysplasia of colon without hemorrhage     Past Medical History:   Diagnosis Date   • Allergic rhinitis    • Aortic valve insufficiency    • Ascending aortic aneurysm (HCC)    • Atrial fibrillation (HCC)    • Bacteremia    • Calcific aortic stenosis of bicuspid valve    • Cardiac arrest (HCC)    • Cardiomyopathy (HCC)    • CKD (chronic kidney disease) stage 3, GFR 30-59 ml/min (HCC)    • Contact dermatitis due to poison ivy    • Elbow fracture    • Esophageal reflux    • GERD (gastroesophageal reflux disease)    • Head injury    • History of transfusion    • Hyperglycemia    • Hyperlipidemia    • Hypertension    • Kidney carcinoma (HCC)    • Nonischemic cardiomyopathy (HCC)    • Renal oncocytoma    • Seasonal allergic  reaction    • Sinusitis    • Syncope    • Type 2 diabetes mellitus (HCC)     uncontrolled   • Visual field defect      Past Surgical History:   Procedure Laterality Date   • AORTIC VALVE REPAIR/REPLACEMENT     • ASCENDING AORTIC ANEURYSM REPAIR W/ MECHANICAL AORTIC VALVE REPLACEMENT     • COLONOSCOPY N/A 10/28/2021    Procedure: COLONOSCOPY to cecum:  cold snare polyps,;  Surgeon: Dinesh Meza MD;  Location:  [facility];  Service: Gastroenterology;  Laterality: N/A;  pre:  Iron deficiency anemia  post:  polyps, diverticulosis,    • COLONOSCOPY N/A 11/9/2021    Procedure: COLONOSCOPY to cecum with APC cautery of AVM and clip placement x1;  Surgeon: Pavan Rodriguez MD;  Location:  [facility];  Service: Gastroenterology;  Laterality: N/A;  PRE - gi bleed, anemia  POST - diverticulosis, poor prep, AVM right colon   • ENDOSCOPY N/A 10/28/2021    Procedure: ESOPHAGOGASTRODUODENOSCOPY with biopsies;  Surgeon: Dinesh Meza MD;  Location:  [facility];  Service: Gastroenterology;  Laterality: N/A;  pre:  Iron deficiency anemia  post:  duodenitis and gastritis   • EYE SURGERY  12/09/2020    cataract surgery    • NEPHRECTOMY     • OTHER SURGICAL HISTORY      elbow surgery   • OTHER SURGICAL HISTORY      right arm surgery   • PROSTATE SURGERY     • THORACENTESIS Left     diagnostic      General Information     Row Name 11/13/21 1612          Physical Therapy Time and Intention    Document Type therapy note (daily note)  -CF     Mode of Treatment physical therapy; individual therapy  -CF     Row Name 11/13/21 1612          General Information    Patient Profile Reviewed yes  -CF     Existing Precautions/Restrictions fall  -CF     Row Name 11/13/21 1612          Cognition    Orientation Status (Cognition) oriented x 4  -CF     Row Name 11/13/21 1612          Safety Issues, Functional Mobility    Safety Issues Affecting Function (Mobility) insight into deficits/self-awareness  -CF      Impairments Affecting Function (Mobility) endurance/activity tolerance; strength; balance  -CF           User Key  (r) = Recorded By, (t) = Taken By, (c) = Cosigned By    Initials Name Provider Type    Rhoda Dior PT Physical Therapist               Mobility     Row Name 11/13/21 1614          Bed Mobility    Bed Mobility supine-sit  -CF     Supine-Sit [place] (Bed Mobility) contact guard  -CF     Assistive Device (Bed Mobility) head of bed elevated  -CF     Comment (Bed Mobility) Increased time to complete  -CF     Row Name 11/13/21 1614          Sit-Stand Transfer    Sit-Stand [place] (Transfers) contact guard; minimum assist (75% patient effort); verbal cues  -CF     Assistive Device (Sit-Stand Transfers) walker, front-wheeled  -CF     Row Name 11/13/21 1614          Gait/Stairs (Locomotion)    [place] Level (Gait) minimum assist (75% patient effort); verbal cues  -CF     Assistive Device (Gait) walker, front-wheeled  -CF     Distance in Feet (Gait) 70'  -CF     Deviations/Abnormal Patterns (Gait) raul decreased; gait speed decreased  -CF     Bilateral Gait Deviations forward flexed posture  -CF     Left Sided Gait Deviations knee buckling, left side  -CF     Comment (Gait/Stairs) Flexed posture, increased distance today  -CF           User Key  (r) = Recorded By, (t) = Taken By, (c) = Cosigned By    Initials Name Provider Type    Rhoda Dior PT Physical Therapist               Obj/Interventions     Row Name 11/13/21 1616          Motor Skills    Therapeutic Exercise other (see comments)  B ankle pumps,, laq, marches x10 reps. encouraged to complete 2x/day  -CF           User Key  (r) = Recorded By, (t) = Taken By, (c) = Cosigned By    Initials Name Provider Type    Rhoda Dior PT Physical Therapist               Goals/Plan    No documentation.                Clinical Impression     Row Name 11/13/21 1615          Pain Scale: Numbers Pre/Post-Treatment     Pretreatment Pain Rating 0/10 - no pain  -CF     Row Name 11/13/21 1615          Plan of Care Review    Plan of Care Reviewed With patient  -CF     Outcome Summary Pt seen for PT treatment this afternoon. Pt increased ambulation distance to 70' with cga/min A using RW. Pt and wife states he has been up to the chair a few times today and walked earlier with nursing assistant. Encouraged pt to get up to chair 3x/day and walk 2x with nursing staff on sunday. PT will follow up monday.  -CF     Row Name 11/13/21 1615          Vital Signs    O2 Delivery Pre Treatment room air  -CF     O2 Delivery Intra Treatment room air  -CF     O2 Delivery Post Treatment room air  -CF     Row Name 11/13/21 1615          Positioning and Restraints    Pre-Treatment Position in bed  -CF     Post Treatment Position chair  -CF     In Chair sitting; call light within reach; encouraged to call for assist; notified nsg; with family/caregiver  -CF           User Key  (r) = Recorded By, (t) = Taken By, (c) = Cosigned By    Initials Name Provider Type    Rhoda Dior, CICI Physical Therapist               Outcome Measures     Row Name 11/13/21 1617          How much help from another person do you currently need...    Turning from your back to your side while in flat bed without using bedrails? 3  -CF     Moving from lying on back to sitting on the side of a flat bed without bedrails? 3  -CF     Moving to and from a bed to a chair (including a wheelchair)? 3  -CF     Standing up from a chair using your arms (e.g., wheelchair, bedside chair)? 3  -CF     Climbing 3-5 steps with a railing? 2  -CF     To walk in hospital room? 3  -CF     AM-PAC 6 Clicks Score (PT) 17  -CF     Row Name 11/13/21 1617          Functional Assessment    Outcome Measure Options AM-PAC 6 Clicks Basic Mobility (PT)  -CF           User Key  (r) = Recorded By, (t) = Taken By, (c) = Cosigned By    Initials Name Provider Type    Rhoda Dior, CICI Physical Therapist                              Physical Therapy Education                 Title: PT OT SLP Therapies (Done)     Topic: Physical Therapy (Done)     Point: Mobility training (Done)     Learning Progress Summary           Patient Acceptance, E, VU,NR by  at 11/13/2021 1617      Show all documentation for this point (1)                 Point: Home exercise program (Done)     Learning Progress Summary           Patient Acceptance, E, VU,NR by CF at 11/13/2021 1617      Show all documentation for this point (1)                 Point: Body mechanics (Done)     Learning Progress Summary           Patient Acceptance, E, VU,NR by CF at 11/13/2021 1617      Show all documentation for this point (1)                 Point: Precautions (Done)     Learning Progress Summary           Patient Acceptance, E, VU,NR by  at 11/13/2021 1617      Show all documentation for this point (1)                             User Key     Initials Effective Dates Name Provider Type Discipline     06/16/21 -  Rhoda Antunez, PT Physical Therapist PT              PT Recommendation and Plan  Planned Therapy Interventions (PT): balance training, bed mobility training, gait training, ROM (range of motion), strengthening, patient/family education, transfer training  Plan of Care Reviewed With: patient  Outcome Summary: Pt seen for PT treatment this afternoon. Pt increased ambulation distance to 70' with cga/min A using RW. Pt and wife states he has been up to the chair a few times today and walked earlier with nursing assistant. Encouraged pt to get up to chair 3x/day and walk 2x with nursing staff on sunday. PT will follow up monday.     Time Calculation:    PT Charges     Row Name 11/13/21 1612             Time Calculation    Start Time 1552  -CF      Stop Time 1607  -      Time Calculation (min) 15 min  -      PT Received On 11/13/21  -      PT - Next Appointment 11/15/21  -            User Key  (r) = Recorded By, (t) = Taken By, (c) =  Cosigned By    Initials Name Provider Type    CF Rhoda Antunez, PT Physical Therapist              Therapy Charges for Today     Code Description Service Date Service Provider Modifiers Qty    11435447218 HC PT EVAL MOD COMPLEXITY 2 11/12/2021 Rhoda Antunez, PT GP 1    63152041280 HC PT THERAPEUTIC ACT EA 15 MIN 11/12/2021 Rhoda Antunez, PT GP 1    74759765060 HC PT THER PROC EA 15 MIN 11/12/2021 Rhoda Antunez, PT GP 1    80890020010 HC PT THER PROC EA 15 MIN 11/13/2021 Rhoda Antunez, PT GP 1          PT G-Codes  Outcome Measure Options: AM-PAC 6 Clicks Basic Mobility (PT)  AM-PAC 6 Clicks Score (PT): 17    Rhoda Antunez, PT  11/13/2021

## 2021-11-13 NOTE — PLAN OF CARE
Problem: Adult Inpatient Plan of Care  Goal: Plan of Care Review  Flowsheets (Taken 11/13/2021 6987)  Progress: improving  Plan of Care Reviewed With: patient  Outcome Summary: VSS. Heparin continued. Pending AM PTT for any rate changes per protocol. Pt denies pain. PT following pt. Pt turned every 2hrs. Afib on telemetry and controlled. Will continue to monitor.   Goal Outcome Evaluation:  Plan of Care Reviewed With: patient        Progress: improving  Outcome Summary: VSS. Heparin continued. Pending AM PTT for any rate changes per protocol. Pt denies pain. PT following pt. Pt turned every 2hrs. Afib on telemetry and controlled. Will continue to monitor.

## 2021-11-13 NOTE — PLAN OF CARE
Goal Outcome Evaluation:  Plan of Care Reviewed With: patient           Outcome Summary: Pt seen for PT treatment this afternoon. Pt increased ambulation distance to 70' with cga/min A using RW. Pt and wife states he has been up to the chair a few times today and walked earlier with nursing assistant. Encouraged pt to get up to chair 3x/day and walk 2x with nursing staff on sunday. PT will follow up monday.

## 2021-11-13 NOTE — PROGRESS NOTES
Name: Francisco Sequeira ADMIT: 2021   : 1935  PCP: Rubin Hinkle APRN    MRN: [id number] LOS: 11 days   AGE/SEX: 85 y.o. male  ROOM: [facility]     Subjective   Subjective   Patient seen this morning. Sitting up in a chair, eating breakfast. No acute events overnight. Daughter is present at bedside. Patient denies any bleeding with bowel movements, no red bright blood or melena. No other signs of bleeding.Patient denies any complaints this morning.    Review of Systems        As above  Objective   Objective   Vital Signs  Temp:  [97.7 °F (36.5 °C)-98.3 °F (36.8 °C)] 97.7 °F (36.5 °C)  Heart Rate:  [77-84] 81  Resp:  [16-18] 18  BP: (112-131)/(57-82) 126/69  SpO2:  [93 %-99 %] 96 %  on   ;   Device (Oxygen Therapy): room air  Body mass index is 24.41 kg/m².  Physical Exam    General: Alert , ill-appearing  HEENT: Normocephalic, atraumatic  CV: Rhythm irregularly irregular, mechanical S2  Lungs: Clear to auscultation bilaterally, no crackles or wheezes  Abdomen: Soft, nontender, nondistended  Neuro: CN II-XII intact, no FND appreciated, generalized weakness in all extremities,  Skin: Bruising noted seen right lower abdomen    Results Review     I reviewed the patient's new clinical results.  Results from last 7 days   Lab Units 11/13/21  0624 11/12/21  0354 11/11/21  0512 11/10/21  0618   WBC 10*3/mm3 5.28 5.94 6.08 7.25   HEMOGLOBIN g/dL 9.3* 8.7* 8.6* 9.1*   PLATELETS 10*3/mm3 224 233 234 244     Results from last 7 days   Lab Units 11/13/21  0624 11/12/21  0354 11/11/21  0512 11/10/21  0618   SODIUM mmol/L 140 142 140 143   POTASSIUM mmol/L 4.0 3.7 3.6 4.0   CHLORIDE mmol/L 106 109* 107 111*   CO2 mmol/L 27.4 24.5 23.5 22.6   BUN mg/dL 18 19 20 21   CREATININE mg/dL 1.20 1.04 1.20 1.05   GLUCOSE mg/dL 136* 123* 140* 117*   Estimated Creatinine Clearance: 53.8 mL/min (by C-G formula based on SCr of 1.2 mg/dL).  Results from last 7 days   Lab Units 21  0624 21  0354 21  0512  11/10/21  0618   ALBUMIN g/dL 2.40* 2.60* 2.60* 2.60*     Results from last 7 days   Lab Units 11/13/21  0624 11/12/21  0354 11/11/21  0512 11/10/21  0618   CALCIUM mg/dL 8.1* 8.0* 8.2* 8.1*   ALBUMIN g/dL 2.40* 2.60* 2.60* 2.60*   PHOSPHORUS mg/dL 3.0 2.7 2.5 2.8       COVID19   Date Value Ref Range Status   11/02/2021 Not Detected Not Detected - Ref. Range Final   10/26/2021 Not Detected Not Detected - Ref. Range Final     Glucose   Date/Time Value Ref Range Status   11/13/2021 0626 143 (H) 70 - 130 mg/dL Final     Comment:     Meter: [id number] : [id number] Kim Nugentriella NA   11/12/2021 2020 180 (H) 70 - 130 mg/dL Final     Comment:     Meter: [id number] : [id number] Kim Nugentriella NA   11/12/2021 1601 114 70 - 130 mg/dL Final     Comment:     Meter: [id number] : [id number] Asher Murrayissa NA   11/12/2021 1144 210 (H) 70 - 130 mg/dL Final     Comment:     Meter: [id number] : [id number] sAher Mcclellan NA   11/12/2021 0616 136 (H) 70 - 130 mg/dL Final     Comment:     Meter: [id number] : [id number] Cutler Malini NA   11/11/2021 2032 189 (H) 70 - 130 mg/dL Final     Comment:     Meter: [id number] : [id number] Cutler Malini NA   11/11/2021 1651 165 (H) 70 - 130 mg/dL Final     Comment:     Meter: [id number] : [id number] Nikole ALEJO       FL Video Swallow With Speech Single Contrast  Narrative: VIDEO SWALLOW STUDY     HISTORY: Dysphagia.     2 minutes 13 seconds fluoroscopy was provided for the speech pathologist  during a video swallow study. [id number] images were saved.     Aspiration was visualized with multiple consistencies. Penetration was  also seen.     Impression: Fluoroscopy was provided for the speech pathologist during a  video swallow study. For full details please see the speech pathology  report     This report was finalized on 11/12/2021 2:34 PM by Dr. Antonio Hart M.D.       Scheduled Medications  atorvastatin, 40 mg, Oral, Daily  dextrose, 25 g, Intravenous,  "Once  digoxin, 125 mcg, Oral, Daily  ferrous sulfate, 325 mg, Oral, Daily With Breakfast  furosemide, 20 mg, Oral, Daily  furosemide, 40 mg, Oral, Daily  guaiFENesin, 600 mg, Oral, Q12H  insulin lispro, 0-9 Units, Subcutaneous, 4x Daily With Meals & Nightly  magnesium oxide, 400 mg, Oral, BID  metoprolol succinate XL, 25 mg, Oral, Daily  pantoprazole, 40 mg, Intravenous, Q12H  sodium bicarbonate, 1,950 mg, Oral, BID    Infusions  heparin (porcine), 12 Units/kg/hr, Last Rate: 10 Units/kg/hr (11/12/21 1513)  hold, 1 each  sodium chloride, 30 mL/hr, Last Rate: Stopped (11/11/21 1928)    Diet  Diet Dysphagia; IV - Mechanical Soft No Mixed Consistencies; Consistent Carbohydrate       Assessment/Plan     Active Hospital Problems    Diagnosis  POA   • **Lower GI bleed [K92.2]  Yes   • Angiodysplasia of colon without hemorrhage [K55.20]  Unknown   • Rectus sheath hematoma [S30.1XXA]  No   • Acute posthemorrhagic anemia [D62]  Yes   • History of nephrectomy [Z90.5]  Not Applicable   • Abnormal urinalysis [R82.90]  Yes   • Recurrent right pleural effusion [J90]  Yes   • 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       84 yo gentleman with h/o DM2, AFib, Mech AVR (on warfarin), HTN, CKD3, h/o RCC s/p right nephrectomy and h/o CVA with mild residual left hemiparesis, admitted here from 10/23 to 10/30 for \"obscure\" GI bleed and bilateral pleural effusions (transudate), who presented to ER with c/o rectal " bleeding and generalized weakness. Found to have drop in Hgb from 9.3 to 7.2. Stool grossly bloody in ER.  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       GI bleed with melena and bright red rectal bleeding,  -GI consulted  -Status post colonoscopy 11/9 , AVM was found and treated APC and clip.    -Start heparin drip on 11/11, hemoglobin-stable, no further signs of bleeding.  -Okay to restart warfarin per GI  -Restart warfarin 11/13, consult pharmacy  -Continue to trend H&H and transfuse as needed.     DVT prophylaxis.  Now on heparin drip.  Full code.  Discussed with patient, family, nursing staff and ST.  Anticipate discharge home with  vs SNU facility next week.      Bowen Coughlin MD  [facility]  11/13/21  11:24 EST

## 2021-11-13 NOTE — PROGRESS NOTES
"   LOS: 11 days    Patient Care Team:  Rubin Hinkle APRN as PCP - General (Family Medicine)  Audra Vazquez RPH as Pharmacist  Vasquez Niño PharmD as Pharmacist (Pharmacy)    Chief Complaint:    Chief Complaint   Patient presents with   • Black or Bloody Stool     Follow UP CKD3  Subjective     Interval History:   UOP NR; Wt NR  No dyspnea     Objective     Vital Signs  Temp:  [97.5 °F (36.4 °C)-98.3 °F (36.8 °C)] 97.5 °F (36.4 °C)  Heart Rate:  [70-84] 70  Resp:  [16-18] 18  BP: (111-131)/(64-82) 111/64    Flowsheet Rows      First Filed Value   Admission Height 182.9 cm (72\") Documented at 11/02/2021 0344   Admission Weight 81.6 kg (180 lb) Documented at 11/02/2021 0344          I/O this shift:  In: 240 [P.O.:240]  Out: -   I/O last 3 completed shifts:  In: 210 [P.O.:210]  Out: 750 [Urine:750]    Intake/Output Summary (Last 24 hours) at 11/13/2021 1514  Last data filed at 11/13/2021 1335  Gross per 24 hour   Intake 450 ml   Output 450 ml   Net 0 ml       Physical Exam:  General Appearance: frail WM in no distress  Neck supple no JVD  Lungs CTA bilat   CV RRR No m/g  abd soft NT/ND  vasc trace ankle edema, 2+ radial pulses       Results Review:    Results from last 7 days   Lab Units 11/13/21  0624 11/12/21 0354 11/11/21 0512   SODIUM mmol/L 140 142 140   POTASSIUM mmol/L 4.0 3.7 3.6   CHLORIDE mmol/L 106 109* 107   CO2 mmol/L 27.4 24.5 23.5   BUN mg/dL 18 19 20   CREATININE mg/dL 1.20 1.04 1.20   CALCIUM mg/dL 8.1* 8.0* 8.2*   GLUCOSE mg/dL 136* 123* 140*       Estimated Creatinine Clearance: 53.8 mL/min (by C-G formula based on SCr of 1.2 mg/dL).    Results from last 7 days   Lab Units 11/13/21  0624 11/12/21 0354 11/11/21  0512   PHOSPHORUS mg/dL 3.0 2.7 2.5             Results from last 7 days   Lab Units 11/13/21  0624 11/12/21  0354 11/11/21  0512 11/10/21  0618 11/09/21  0603   WBC 10*3/mm3 5.28 5.94 6.08 7.25 7.19   HEMOGLOBIN g/dL 9.3* 8.7* 8.6* 9.1* 8.4*   PLATELETS 10*3/mm3 224 233 234 244 219 "       Results from last 7 days   Lab Units 11/13/21  0624 11/12/21  0354 11/11/21  0512 11/10/21  0618 11/09/21  0603   INR  1.27* 1.46* 1.57* 1.73* 2.00*         Imaging Results (Last 24 Hours)     ** No results found for the last 24 hours. **        atorvastatin, 40 mg, Oral, Daily  dextrose, 25 g, Intravenous, Once  digoxin, 125 mcg, Oral, Daily  ferrous sulfate, 325 mg, Oral, Daily With Breakfast  furosemide, 20 mg, Oral, Daily  furosemide, 40 mg, Oral, Daily  guaiFENesin, 600 mg, Oral, Q12H  insulin lispro, 0-9 Units, Subcutaneous, 4x Daily With Meals & Nightly  magnesium oxide, 400 mg, Oral, BID  metoprolol succinate XL, 25 mg, Oral, Daily  pantoprazole, 40 mg, Intravenous, Q12H  sodium bicarbonate, 1,950 mg, Oral, BID  warfarin, 7.5 mg, Oral, Once      heparin (porcine), 12 Units/kg/hr, Last Rate: 10 Units/kg/hr (11/13/21 0727)  hold, 1 each  Pharmacy to dose warfarin,   sodium chloride, 30 mL/hr, Last Rate: Stopped (11/11/21 1928)        Medication Review:   Current Facility-Administered Medications   Medication Dose Route Frequency Provider Last Rate Last Admin   • acetaminophen (TYLENOL) tablet 650 mg  650 mg Oral Q4H PRN Pavan Rodriguez MD       • atorvastatin (LIPITOR) tablet 40 mg  40 mg Oral Daily Pavan Rodriguez MD   40 mg at 11/13/21 0944   • dextrose (D50W) (25 g/50 mL) IV injection 25 g  25 g Intravenous Q15 Min PRN Pavan Rodriguez MD       • dextrose (D50W) (25 g/50 mL) IV injection 25 g  25 g Intravenous Once Pavan Rodriguez MD       • dextrose (GLUTOSE) oral gel 15 g  15 g Oral Q15 Min PRN Pavan Rodriguez MD       • digoxin (LANOXIN) tablet 125 mcg  125 mcg Oral Daily Pavan Rodriguez MD   125 mcg at 11/13/21 0943   • diphenhydrAMINE (BENADRYL) capsule 25 mg  25 mg Oral BID PRN Pavan Rodriguez MD       • ferrous sulfate tablet 325 mg  325 mg Oral Daily With Breakfast Pavan Rodriguez MD   325 mg at 11/13/21 0998   • furosemide (LASIX) tablet 20 mg  20 mg  Oral Daily Laz Fernandes MD   20 mg at 11/12/21 1728   • furosemide (LASIX) tablet 40 mg  40 mg Oral Daily Laz Fernandes MD   40 mg at 11/13/21 0943   • glucagon (human recombinant) (GLUCAGEN DIAGNOSTIC) injection 1 mg  1 mg Subcutaneous PRN Pavan Rodriguez MD       • guaiFENesin (MUCINEX) 12 hr tablet 600 mg  600 mg Oral Q12H Pavan Rodriguez MD   600 mg at 11/13/21 0943   • heparin 71842 units/250 mL (100 units/mL) in 0.45 % NaCl infusion  12 Units/kg/hr Intravenous Titrated Iza Mckeon APRN 8.16 mL/hr at 11/13/21 0727 10 Units/kg/hr at 11/13/21 0727   • Hold medication  1 each Does not apply Continuous PRN Lebron Cano MD       • HYDROcodone-acetaminophen (NORCO) 5-325 MG per tablet 1 tablet  1 tablet Oral Q6H PRN Pavan Rodriguez MD   1 tablet at 11/02/21 2347   • insulin lispro (ADMELOG) injection 0-9 Units  0-9 Units Subcutaneous 4x Daily With Meals & Nightly Pavan Rodriguez MD   4 Units at 11/13/21 1219   • magnesium oxide (MAG-OX) tablet 400 mg  400 mg Oral BID Pavan Rodriguez MD   400 mg at 11/13/21 0943   • metoprolol succinate XL (TOPROL-XL) 24 hr tablet 25 mg  25 mg Oral Daily Pavan Rodriguez MD   25 mg at 11/13/21 0943   • nitroglycerin (NITROSTAT) SL tablet 0.4 mg  0.4 mg Sublingual Q5 Min PRN Pavan Rodriguez MD       • ondansetron (ZOFRAN) tablet 4 mg  4 mg Oral Q6H PRN Pavan Rodriguez MD        Or   • ondansetron (ZOFRAN) injection 4 mg  4 mg Intravenous Q6H PRN Pavan Rodriguez MD   4 mg at 11/04/21 0008   • pantoprazole (PROTONIX) injection 40 mg  40 mg Intravenous Q12H Pavan Rodriguez MD   40 mg at 11/13/21 0943   • Pharmacy to dose warfarin   Does not apply Continuous PRN Bowen Coughlin MD       • sodium bicarbonate tablet 1,950 mg  1,950 mg Oral BID Pavan Rodriguez MD   1,950 mg at 11/13/21 0943   • sodium chloride 0.9 % infusion  30 mL/hr Intravenous Continuous PRN Pavan Rodriguez MD   Stopped at  11/11/21 1928   • warfarin (COUMADIN) tablet 7.5 mg  7.5 mg Oral Once Bowen Coughlin MD           Assessment/Plan   1. BYRON on CKD3, non-oliguric, BYRON resolved (ATN from hypovolemia, blood loss, hypotension, contrast), peak Cr 2.0, now stable 1.2 mg/dL  2. Acute GI bleed with bloody stools noted this admission and tagged RBC scan showing bleed in the RLQ consistent with distal ileum or proximal colon.  Colonoscopy performed with AVM that was cauterized on IV heparin  3. Vol overload - improved, but not tracking UOP or weight; on PO lasix 40 mg AM, 20mg PM   4. History of mechanical AVR and Atrial Fibrillation anticoagulated with warfarin. His warfarin is being held and IV heparin drip infusing  5. Hypertension, controlled  6. Recurrent right pleural effusion; transudate per last thoracentesis. Repeat thoracentesis performed on 11/11, 1.6L removed   7. Type II diabetes. Hgb A1c 5.2 last admission.  8. History of CVA with left side hemiparesis.  9.         Metabolic acidosis - overcorrected on high dose nahco3 tablet (3 BID)    Plan  - inc lasix 40mg PO BID  - Reduce nahco3 dose  - check daily weight, standing scale, d/w RN        Lower GI bleed    Atrial fibrillation (HCC)    Hypertension    Uncontrolled type 2 diabetes mellitus with complication, with long-term current use of insulin (HCC)    Hx of aortic valve replacement, mechanical [Z95.2]    Stage 3b chronic kidney disease (HCC)    Chronic anticoagulation    History of nephrectomy    Abnormal urinalysis    Recurrent right pleural effusion    Hemiparesis of left nondominant side as late effect of cerebral infarction (HCC)    Rectus sheath hematoma    Acute posthemorrhagic anemia    Angiodysplasia of colon without hemorrhage              Al Nicole MD  11/13/21  15:14 EST

## 2021-11-14 LAB
ALBUMIN SERPL-MCNC: 3 G/DL (ref 3.5–5.2)
ANION GAP SERPL CALCULATED.3IONS-SCNC: 11.1 MMOL/L (ref 5–15)
APTT PPP: 53.5 SECONDS (ref 22.7–35.4)
APTT PPP: 56.7 SECONDS (ref 22.7–35.4)
BASOPHILS # BLD AUTO: 0.04 10*3/MM3 (ref 0–0.2)
BASOPHILS NFR BLD AUTO: 0.7 % (ref 0–1.5)
BUN SERPL-MCNC: 22 MG/DL (ref 8–23)
BUN/CREAT SERPL: 18.2 (ref 7–25)
CALCIUM SPEC-SCNC: 8.3 MG/DL (ref 8.6–10.5)
CHLORIDE SERPL-SCNC: 102 MMOL/L (ref 98–107)
CO2 SERPL-SCNC: 24.9 MMOL/L (ref 22–29)
CREAT SERPL-MCNC: 1.21 MG/DL (ref 0.76–1.27)
DEPRECATED RDW RBC AUTO: 59.1 FL (ref 37–54)
EOSINOPHIL # BLD AUTO: 0.33 10*3/MM3 (ref 0–0.4)
EOSINOPHIL NFR BLD AUTO: 6.1 % (ref 0.3–6.2)
ERYTHROCYTE [DISTWIDTH] IN BLOOD BY AUTOMATED COUNT: 19.9 % (ref 12.3–15.4)
GFR SERPL CREATININE-BSD FRML MDRD: 57 ML/MIN/1.73
GLUCOSE BLDC GLUCOMTR-MCNC: 161 MG/DL (ref 70–130)
GLUCOSE BLDC GLUCOMTR-MCNC: 174 MG/DL (ref 70–130)
GLUCOSE BLDC GLUCOMTR-MCNC: 179 MG/DL (ref 70–130)
GLUCOSE BLDC GLUCOMTR-MCNC: 251 MG/DL (ref 70–130)
GLUCOSE SERPL-MCNC: 156 MG/DL (ref 65–99)
HCT VFR BLD AUTO: 32.3 % (ref 37.5–51)
HGB BLD-MCNC: 10.1 G/DL (ref 13–17.7)
IMM GRANULOCYTES # BLD AUTO: 0.04 10*3/MM3 (ref 0–0.05)
IMM GRANULOCYTES NFR BLD AUTO: 0.7 % (ref 0–0.5)
INR PPP: 1.21 (ref 0.9–1.1)
LYMPHOCYTES # BLD AUTO: 0.86 10*3/MM3 (ref 0.7–3.1)
LYMPHOCYTES NFR BLD AUTO: 15.9 % (ref 19.6–45.3)
MAGNESIUM SERPL-MCNC: 1.9 MG/DL (ref 1.6–2.4)
MCH RBC QN AUTO: 25.6 PG (ref 26.6–33)
MCHC RBC AUTO-ENTMCNC: 31.3 G/DL (ref 31.5–35.7)
MCV RBC AUTO: 81.8 FL (ref 79–97)
MONOCYTES # BLD AUTO: 0.35 10*3/MM3 (ref 0.1–0.9)
MONOCYTES NFR BLD AUTO: 6.5 % (ref 5–12)
NEUTROPHILS NFR BLD AUTO: 3.8 10*3/MM3 (ref 1.7–7)
NEUTROPHILS NFR BLD AUTO: 70.1 % (ref 42.7–76)
NRBC BLD AUTO-RTO: 0.2 /100 WBC (ref 0–0.2)
PHOSPHATE SERPL-MCNC: 3.1 MG/DL (ref 2.5–4.5)
PLATELET # BLD AUTO: 258 10*3/MM3 (ref 140–450)
PMV BLD AUTO: 11.5 FL (ref 6–12)
POTASSIUM SERPL-SCNC: 3.7 MMOL/L (ref 3.5–5.2)
PROTHROMBIN TIME: 15.2 SECONDS (ref 11.7–14.2)
RBC # BLD AUTO: 3.95 10*6/MM3 (ref 4.14–5.8)
SODIUM SERPL-SCNC: 138 MMOL/L (ref 136–145)
WBC # BLD AUTO: 5.42 10*3/MM3 (ref 3.4–10.8)

## 2021-11-14 PROCEDURE — 85610 PROTHROMBIN TIME: CPT | Performed by: INTERNAL MEDICINE

## 2021-11-14 PROCEDURE — 85730 THROMBOPLASTIN TIME PARTIAL: CPT | Performed by: NURSE PRACTITIONER

## 2021-11-14 PROCEDURE — 85025 COMPLETE CBC W/AUTO DIFF WBC: CPT | Performed by: NURSE PRACTITIONER

## 2021-11-14 PROCEDURE — 80069 RENAL FUNCTION PANEL: CPT | Performed by: INTERNAL MEDICINE

## 2021-11-14 PROCEDURE — 82962 GLUCOSE BLOOD TEST: CPT

## 2021-11-14 PROCEDURE — 63710000001 INSULIN LISPRO (HUMAN) PER 5 UNITS: Performed by: INTERNAL MEDICINE

## 2021-11-14 PROCEDURE — 83735 ASSAY OF MAGNESIUM: CPT | Performed by: STUDENT IN AN ORGANIZED HEALTH CARE EDUCATION/TRAINING PROGRAM

## 2021-11-14 PROCEDURE — 85730 THROMBOPLASTIN TIME PARTIAL: CPT | Performed by: INTERNAL MEDICINE

## 2021-11-14 PROCEDURE — 99232 SBSQ HOSP IP/OBS MODERATE 35: CPT | Performed by: INTERNAL MEDICINE

## 2021-11-14 RX ORDER — WARFARIN SODIUM 7.5 MG/1
7.5 TABLET ORAL
Status: COMPLETED | OUTPATIENT
Start: 2021-11-14 | End: 2021-11-14

## 2021-11-14 RX ADMIN — ATORVASTATIN CALCIUM 40 MG: 20 TABLET, FILM COATED ORAL at 09:23

## 2021-11-14 RX ADMIN — FERROUS SULFATE TAB 325 MG (65 MG ELEMENTAL FE) 325 MG: 325 (65 FE) TAB at 09:24

## 2021-11-14 RX ADMIN — INSULIN LISPRO 6 UNITS: 100 INJECTION, SOLUTION INTRAVENOUS; SUBCUTANEOUS at 18:22

## 2021-11-14 RX ADMIN — SODIUM BICARBONATE 650 MG: 650 TABLET ORAL at 09:24

## 2021-11-14 RX ADMIN — GUAIFENESIN 600 MG: 600 TABLET, EXTENDED RELEASE ORAL at 09:24

## 2021-11-14 RX ADMIN — FUROSEMIDE 40 MG: 40 TABLET ORAL at 09:24

## 2021-11-14 RX ADMIN — PANTOPRAZOLE SODIUM 40 MG: 40 INJECTION, POWDER, FOR SOLUTION INTRAVENOUS at 21:08

## 2021-11-14 RX ADMIN — INSULIN LISPRO 2 UNITS: 100 INJECTION, SOLUTION INTRAVENOUS; SUBCUTANEOUS at 12:26

## 2021-11-14 RX ADMIN — INSULIN LISPRO 2 UNITS: 100 INJECTION, SOLUTION INTRAVENOUS; SUBCUTANEOUS at 09:23

## 2021-11-14 RX ADMIN — METOPROLOL SUCCINATE 25 MG: 25 TABLET, EXTENDED RELEASE ORAL at 09:24

## 2021-11-14 RX ADMIN — SODIUM BICARBONATE 650 MG: 650 TABLET ORAL at 21:08

## 2021-11-14 RX ADMIN — GUAIFENESIN 600 MG: 600 TABLET, EXTENDED RELEASE ORAL at 21:08

## 2021-11-14 RX ADMIN — MAGNESIUM OXIDE 400 MG (241.3 MG MAGNESIUM) TABLET 400 MG: TABLET at 09:24

## 2021-11-14 RX ADMIN — FUROSEMIDE 40 MG: 40 TABLET ORAL at 18:22

## 2021-11-14 RX ADMIN — PANTOPRAZOLE SODIUM 40 MG: 40 INJECTION, POWDER, FOR SOLUTION INTRAVENOUS at 09:24

## 2021-11-14 RX ADMIN — WARFARIN 7.5 MG: 7.5 TABLET ORAL at 18:22

## 2021-11-14 RX ADMIN — INSULIN LISPRO 2 UNITS: 100 INJECTION, SOLUTION INTRAVENOUS; SUBCUTANEOUS at 21:08

## 2021-11-14 RX ADMIN — MAGNESIUM OXIDE 400 MG (241.3 MG MAGNESIUM) TABLET 400 MG: TABLET at 21:08

## 2021-11-14 RX ADMIN — DIGOXIN 125 MCG: 250 TABLET ORAL at 09:23

## 2021-11-14 NOTE — PROGRESS NOTES
"   LOS: 12 days    Patient Care Team:  Rubin Hinkle APRN as PCP - General (Family Medicine)  Audra Vazquez RPH as Pharmacist  Vasquez Niño PharmD as Pharmacist (Pharmacy)    Chief Complaint:    Chief Complaint   Patient presents with   • Black or Bloody Stool     Follow UP BYRON CKD  Subjective     Interval History:   I/O 240/1125 + 4 voids  Wt down 3 lbs by standing scale 162 to 159  Denies dyspnea or swelling    Objective     Vital Signs  Temp:  [97.7 °F (36.5 °C)-98 °F (36.7 °C)] 97.7 °F (36.5 °C)  Heart Rate:  [76-88] 82  Resp:  [18-20] 18  BP: (118-132)/(52-68) 118/68    Flowsheet Rows      First Filed Value   Admission Height 182.9 cm (72\") Documented at 11/02/2021 0344   Admission Weight 81.6 kg (180 lb) Documented at 11/02/2021 0344          No intake/output data recorded.  I/O last 3 completed shifts:  In: 240 [P.O.:240]  Out: 1475 [Urine:1475]    Intake/Output Summary (Last 24 hours) at 11/14/2021 1423  Last data filed at 11/14/2021 0500  Gross per 24 hour   Intake 0 ml   Output 1125 ml   Net -1125 ml       Physical Exam:  General Appearance: frail WM in no distress  Neck supple no JVD  Lungs CTA bilat   CV RRR No m/g  abd soft NT/ND  vasc trace ankle edema, 2+ radial pulses          Results Review:    Results from last 7 days   Lab Units 11/14/21  0613 11/13/21  0624 11/12/21  0354   SODIUM mmol/L 138 140 142   POTASSIUM mmol/L 3.7 4.0 3.7   CHLORIDE mmol/L 102 106 109*   CO2 mmol/L 24.9 27.4 24.5   BUN mg/dL 22 18 19   CREATININE mg/dL 1.21 1.20 1.04   CALCIUM mg/dL 8.3* 8.1* 8.0*   GLUCOSE mg/dL 156* 136* 123*       Estimated Creatinine Clearance: 47.4 mL/min (by C-G formula based on SCr of 1.21 mg/dL).    Results from last 7 days   Lab Units 11/14/21  0613 11/13/21 0624 11/12/21 0354   MAGNESIUM mg/dL 1.9  --   --    PHOSPHORUS mg/dL 3.1 3.0 2.7             Results from last 7 days   Lab Units 11/14/21  0613 11/13/21 0624 11/12/21 0354 11/11/21  0512 11/10/21  0618   WBC 10*3/mm3 5.42 5.28 5.94 " 6.08 7.25   HEMOGLOBIN g/dL 10.1* 9.3* 8.7* 8.6* 9.1*   PLATELETS 10*3/mm3 258 224 233 234 244       Results from last 7 days   Lab Units 11/14/21  0613 11/13/21  0624 11/12/21  0354 11/11/21  0512 11/10/21  0618   INR  1.21* 1.27* 1.46* 1.57* 1.73*         Imaging Results (Last 24 Hours)     ** No results found for the last 24 hours. **        atorvastatin, 40 mg, Oral, Daily  dextrose, 25 g, Intravenous, Once  digoxin, 125 mcg, Oral, Daily  ferrous sulfate, 325 mg, Oral, Daily With Breakfast  furosemide, 40 mg, Oral, BID  guaiFENesin, 600 mg, Oral, Q12H  insulin lispro, 0-9 Units, Subcutaneous, 4x Daily With Meals & Nightly  magnesium oxide, 400 mg, Oral, BID  metoprolol succinate XL, 25 mg, Oral, Daily  pantoprazole, 40 mg, Intravenous, Q12H  sodium bicarbonate, 650 mg, Oral, BID  warfarin, 7.5 mg, Oral, Once      heparin (porcine), 12 Units/kg/hr, Last Rate: 11 Units/kg/hr (11/14/21 0929)  hold, 1 each  Pharmacy to dose warfarin,   sodium chloride, 30 mL/hr, Last Rate: Stopped (11/11/21 1928)        Medication Review:   Current Facility-Administered Medications   Medication Dose Route Frequency Provider Last Rate Last Admin   • acetaminophen (TYLENOL) tablet 650 mg  650 mg Oral Q4H PRN Pavan Rodriguez MD       • atorvastatin (LIPITOR) tablet 40 mg  40 mg Oral Daily Pavan Rodriguez MD   40 mg at 11/14/21 0923   • dextrose (D50W) (25 g/50 mL) IV injection 25 g  25 g Intravenous Q15 Min PRN Pavan Rodriguez MD       • dextrose (D50W) (25 g/50 mL) IV injection 25 g  25 g Intravenous Once Pavan Rodriguez MD       • dextrose (GLUTOSE) oral gel 15 g  15 g Oral Q15 Min PRN Pavan Rodriguez MD       • digoxin (LANOXIN) tablet 125 mcg  125 mcg Oral Daily Pavan Rodriguez MD   125 mcg at 11/14/21 0923   • diphenhydrAMINE (BENADRYL) capsule 25 mg  25 mg Oral BID PRN Pavan Rodriguez MD       • ferrous sulfate tablet 325 mg  325 mg Oral Daily With Breakfast Pavan Rodriguez MD   325 mg at  11/14/21 0924   • furosemide (LASIX) tablet 40 mg  40 mg Oral BID Al Nicole MD   40 mg at 11/14/21 0924   • glucagon (human recombinant) (GLUCAGEN DIAGNOSTIC) injection 1 mg  1 mg Subcutaneous PRN Pavan Rodriguez MD       • guaiFENesin (MUCINEX) 12 hr tablet 600 mg  600 mg Oral Q12H Pavan Rodriguez MD   600 mg at 11/14/21 0924   • heparin 04205 units/250 mL (100 units/mL) in 0.45 % NaCl infusion  12 Units/kg/hr Intravenous Titrated Iza Mckeon APRN 8.97 mL/hr at 11/14/21 0929 11 Units/kg/hr at 11/14/21 0929   • Hold medication  1 each Does not apply Continuous PRN Lebron Cano MD       • HYDROcodone-acetaminophen (NORCO) 5-325 MG per tablet 1 tablet  1 tablet Oral Q6H PRN Pavan Rodriguez MD   1 tablet at 11/02/21 2347   • insulin lispro (ADMELOG) injection 0-9 Units  0-9 Units Subcutaneous 4x Daily With Meals & Nightly Pavan Rodriguez MD   2 Units at 11/14/21 1226   • magnesium oxide (MAG-OX) tablet 400 mg  400 mg Oral BID Pavan Rodriguez MD   400 mg at 11/14/21 0924   • metoprolol succinate XL (TOPROL-XL) 24 hr tablet 25 mg  25 mg Oral Daily Pavan Rodriguez MD   25 mg at 11/14/21 0924   • nitroglycerin (NITROSTAT) SL tablet 0.4 mg  0.4 mg Sublingual Q5 Min PRN Pavan Rodriguez MD       • ondansetron (ZOFRAN) tablet 4 mg  4 mg Oral Q6H PRN Pavan Rodriguez MD        Or   • ondansetron (ZOFRAN) injection 4 mg  4 mg Intravenous Q6H PRN Pavan Rodriguez MD   4 mg at 11/04/21 0008   • pantoprazole (PROTONIX) injection 40 mg  40 mg Intravenous Q12H Pavan Rodriguez MD   40 mg at 11/14/21 0924   • Pharmacy to dose warfarin   Does not apply Continuous PRN Bowen Coughlin MD       • sodium bicarbonate tablet 650 mg  650 mg Oral BID Al Nicole MD   650 mg at 11/14/21 0924   • sodium chloride 0.9 % infusion  30 mL/hr Intravenous Continuous PRN Pavan Rodriguez MD   Stopped at 11/11/21 1928   • warfarin (COUMADIN) tablet 7.5  mg  7.5 mg Oral Once Bowen Coughlin MD           Assessment/Plan   1. BYRON on CKD3, non-oliguric, BYRON resolved (ATN from hypovolemia, blood loss, hypotension, contrast), peak Cr 2.0, now stable 1.2 mg/dL  2. Acute GI bleed with bloody stools noted this admission and tagged RBC scan showing bleed in the RLQ consistent with distal ileum or proximal colon.  Colonoscopy performed with AVM that was cauterized on IV heparin  3. Vol overload - improved, inc'd lasix 40 BID yesterday; wt down 3 lbs, scant edema, no dyspnea   4. History of mechanical AVR and Atrial Fibrillation anticoagulated with warfarin. His warfarin is being held and IV heparin drip infusing  5. Hypertension, controlled  6. Recurrent right pleural effusion; transudative. Repeat thoracentesis performed on 11/11, 1.6L removed   7. Type II diabetes. Hgb A1c 5.2 last admission.  8. History of CVA with left side hemiparesis.  9.         Metabolic acidosis - overcorrected, cut nahco3 dose down to 650 BID    Plan  - continue lasix 40 BID, discussed timing of administration with him and wife for home use  - ok with discharge tomorrow from nephrology standpoint  - follow up 1 month with Dr Dominguez  - note recommendation for repeat CXR 1-2 weeks per PULM      Lower GI bleed    Atrial fibrillation (HCC)    Hypertension    Uncontrolled type 2 diabetes mellitus with complication, with long-term current use of insulin (HCC)    Hx of aortic valve replacement, mechanical [Z95.2]    Stage 3b chronic kidney disease (HCC)    Chronic anticoagulation    History of nephrectomy    Abnormal urinalysis    Recurrent right pleural effusion    Hemiparesis of left nondominant side as late effect of cerebral infarction (HCC)    Rectus sheath hematoma    Acute posthemorrhagic anemia    Angiodysplasia of colon without hemorrhage              Al Nicole MD  11/14/21  14:23 EST

## 2021-11-14 NOTE — PROGRESS NOTES
[facility]  Cardiology Progress Note    Patient Identification:  Name: Francisco Sequeira  Age: 85 y.o.  Sex: male  :  1935  MRN: [id number]                 Follow Up / Chief Complaint: Anticoagulation    Interval History:  No chest pains or tightness in the chest     Subjective:  No chest pains or tightness in the chest    Objective:    Past Medical History:  Past Medical History:   Diagnosis Date   • Allergic rhinitis    • Aortic valve insufficiency    • Ascending aortic aneurysm (HCC)    • Atrial fibrillation (HCC)    • Bacteremia    • Calcific aortic stenosis of bicuspid valve    • Cardiac arrest (HCC)    • Cardiomyopathy (HCC)    • CKD (chronic kidney disease) stage 3, GFR 30-59 ml/min (HCC)    • Contact dermatitis due to poison ivy    • Elbow fracture    • Esophageal reflux    • GERD (gastroesophageal reflux disease)    • Head injury    • History of transfusion    • Hyperglycemia    • Hyperlipidemia    • Hypertension    • Kidney carcinoma (HCC)    • Nonischemic cardiomyopathy (HCC)    • Renal oncocytoma    • Seasonal allergic reaction    • Sinusitis    • Syncope    • Type 2 diabetes mellitus (HCC)     uncontrolled   • Visual field defect      Past Surgical History:  Past Surgical History:   Procedure Laterality Date   • AORTIC VALVE REPAIR/REPLACEMENT     • ASCENDING AORTIC ANEURYSM REPAIR W/ MECHANICAL AORTIC VALVE REPLACEMENT     • COLONOSCOPY N/A 10/28/2021    Procedure: COLONOSCOPY to cecum:  cold snare polyps,;  Surgeon: Dinesh Meza MD;  Location: [facility] ENDOSCOPY;  Service: Gastroenterology;  Laterality: N/A;  pre:  Iron deficiency anemia  post:  polyps, diverticulosis,    • COLONOSCOPY N/A 2021    Procedure: COLONOSCOPY to cecum with APC cautery of AVM and clip placement x1;  Surgeon: Pavan Rodriguez MD;  Location: [facility] ENDOSCOPY;  Service: Gastroenterology;  Laterality: N/A;  PRE - gi bleed, anemia  POST - diverticulosis, poor prep, AVM right colon   •  "ENDOSCOPY N/A 10/28/2021    Procedure: ESOPHAGOGASTRODUODENOSCOPY with biopsies;  Surgeon: Dinesh Meza MD;  Location: [facility] ENDOSCOPY;  Service: Gastroenterology;  Laterality: N/A;  pre:  Iron deficiency anemia  post:  duodenitis and gastritis   • EYE SURGERY  12/09/2020    cataract surgery    • NEPHRECTOMY     • OTHER SURGICAL HISTORY      elbow surgery   • OTHER SURGICAL HISTORY      right arm surgery   • PROSTATE SURGERY     • THORACENTESIS Left     diagnostic        Social History:   Social History     Tobacco Use   • Smoking status: Former Smoker   • Smokeless tobacco: Former User   • Tobacco comment: caffeine use   Substance Use Topics   • Alcohol use: Yes     Comment: occasional      Family History:  Family History   Problem Relation Age of Onset   • Cancer Mother         colon   • Cancer Brother         colon          Allergies:  Allergies   Allergen Reactions   • Other Other (See Comments)     Grass, ragweed   • Penicillins Swelling   • Percocet [Oxycodone-Acetaminophen] Nausea And Vomiting     Scheduled Meds:  atorvastatin, 40 mg, Daily  dextrose, 25 g, Once  digoxin, 125 mcg, Daily  ferrous sulfate, 325 mg, Daily With Breakfast  furosemide, 40 mg, BID  guaiFENesin, 600 mg, Q12H  insulin lispro, 0-9 Units, 4x Daily With Meals & Nightly  magnesium oxide, 400 mg, BID  metoprolol succinate XL, 25 mg, Daily  pantoprazole, 40 mg, Q12H  sodium bicarbonate, 650 mg, BID  warfarin, 7.5 mg, Once            INTAKE AND OUTPUT:    Intake/Output Summary (Last 24 hours) at 11/14/2021 1304  Last data filed at 11/14/2021 0500  Gross per 24 hour   Intake 240 ml   Output 1125 ml   Net -885 ml       ROS  Constitutional: Awake and alert, no fever. No nosebleeds  Abdomen           no abdominal pain   Cardiac              no chest pain  Pulmonary          no shortness of breath      /68   Pulse 82   Temp 97.7 °F (36.5 °C) (Oral)   Resp 18   Ht 182.9 cm (72\")   Wt 72.5 kg (159 lb 13.3 oz)   SpO2 99%   BMI " "21.68 kg/m²   General appearance: No acute changes   Neck: Trachea midline; NECK, supple, no thyromegaly or lymphadenopathy   Lungs: Normal size and shape, normal breath sounds, equal distribution of air, no rales or rhonchi   CV: S1-S2 regular, no murmurs, no rub, no gallop   Abdomen: Soft, nontender; no masses , no abnormal abdominal sounds   Extremities: No deformity , normal color , no peripheral edema   Skin: Normal temperature, turgor and texture; no rash, ulcers            Cardiographics  Telemetry:     ECG:     Echocardiogram:     Lab Review       Results from last 7 days   Lab Units 11/14/21  0613   MAGNESIUM mg/dL 1.9     Results from last 7 days   Lab Units 11/14/21  0613   SODIUM mmol/L 138   POTASSIUM mmol/L 3.7   BUN mg/dL 22   CREATININE mg/dL 1.21   CALCIUM mg/dL 8.3*     @[id number]@  Results from last 7 days   Lab Units 11/14/21  0613 11/13/21  0624 11/12/21  0354   WBC 10*3/mm3 5.42 5.28 5.94   HEMOGLOBIN g/dL 10.1* 9.3* 8.7*   HEMATOCRIT % 32.3* 29.6* 29.2*   PLATELETS 10*3/mm3 258 224 233     Results from last 7 days   Lab Units 11/14/21  0613 11/13/21  0624 11/12/21  0354   INR  1.21* 1.27* 1.46*   APTT seconds 56.7* 74.5* 81.1*         Assessment:  Atrial fibrillation on heparin drip now  GI bleed      Plan:    Coumadin has been restarted  Continue heparin until PT/INR more than 2  INR continues to be low 1.2  )11/14/2021  Valentin Gtz MD      [facility] Dragon/Transcription:   \"Dictated utilizing Dragon dictation\".     " 55.3

## 2021-11-14 NOTE — PROGRESS NOTES
Pharmacy Consult: Warfarin Dosing/ Monitoring    Francisco Sequeira is a 85 y.o. male, estimated creatinine clearance is 47.4 mL/min (by C-G formula based on SCr of 1.21 mg/dL). weighing 72.5 kg (159 lb 13.3 oz).     has a past medical history of Allergic rhinitis, Aortic valve insufficiency, Ascending aortic aneurysm (HCC), Atrial fibrillation (HCC), Bacteremia, Calcific aortic stenosis of bicuspid valve, Cardiac arrest (HCC), Cardiomyopathy (HCC), CKD (chronic kidney disease) stage 3, GFR 30-59 ml/min (HCC), Contact dermatitis due to poison ivy, Elbow fracture, Esophageal reflux, GERD (gastroesophageal reflux disease), Head injury, History of transfusion, Hyperglycemia, Hyperlipidemia, Hypertension, Kidney carcinoma (HCC), Nonischemic cardiomyopathy (HCC), Renal oncocytoma, Seasonal allergic reaction, Sinusitis, Syncope, Type 2 diabetes mellitus (HCC), and Visual field defect.    Social History     Tobacco Use    Smoking status: Former Smoker    Smokeless tobacco: Former User    Tobacco comment: caffeine use   Vaping Use    Vaping Use: Never used   Substance Use Topics    Alcohol use: Yes     Comment: occasional    Drug use: No       Results from last 7 days   Lab Units 11/14/21  0613 11/13/21  0624 11/12/21  0354 11/11/21  1953 11/11/21  1143 11/11/21  0512 11/10/21  0618 11/09/21  0603 11/08/21  1737 11/08/21  0427 11/08/21  0427   INR  1.21* 1.27* 1.46*  --   --  1.57* 1.73* 2.00*  --   --  1.89*   APTT seconds 56.7* 74.5* 81.1* 95.1* 44.7*  --   --   --   --   --   --    HEMOGLOBIN g/dL 10.1* 9.3* 8.7*  --   --  8.6* 9.1* 8.4* 9.5*   < > 8.4*   HEMATOCRIT % 32.3* 29.6* 29.2*  --   --  26.5* 30.0* 26.3* 30.8*   < > 26.3*   PLATELETS 10*3/mm3 258 224 233  --   --  234 244 219  --   --  195    < > = values in this interval not displayed.     Results from last 7 days   Lab Units 11/14/21  0613 11/13/21  0624 11/12/21  0354   SODIUM mmol/L 138 140 142   POTASSIUM mmol/L 3.7 4.0 3.7   CHLORIDE mmol/L 102 106 109*    CO2 mmol/L 24.9 27.4 24.5   BUN mg/dL 22 18 19   CREATININE mg/dL 1.21 1.20 1.04   CALCIUM mg/dL 8.3* 8.1* 8.0*   GLUCOSE mg/dL 156* 136* 123*     Anticoagulation history: MWF 7.5 mg, SuTThSa 5 mg outpatient.     Hospital Anticoagulation:  Consulting provider: Dr. Coughlin  Start date: restarted 11/13.  Indication: mechanical ht valve  Target INR: 2.5-3.5  Expected duration: lifetime   Bridge Therapy: yes            and unfractionated heparin IV infusion  Date 11/13 11/14           INR 1.27 1.21           Warfarin dose 7.5 7.5 mg             Potential drug interactions:   Acetaminophen-moderate-may increase bleeding risk  Pantoprazole-may increase INR    Relevant nutrition status: mechanical soft w/ boost supplements    Other:     Education complete?/ Date: not yet, is long term medicaiton    Assessment/Plan:  Dose 7.5 mg today  Monitor pt/inr daily  Follow up tomorrow am after reviewing INR    Pharmacy will continue to follow until discharge or discontinuation of warfarin.   Braydon Hinkle [facility]  11/14/2021

## 2021-11-14 NOTE — PROGRESS NOTES
Name: Francisco Sequeira ADMIT: 2021   : 1935  PCP: Rubin Hinkle APRN    MRN: [id number] LOS: 12 days   AGE/SEX: 85 y.o. male  ROOM: [facility]     Subjective   Subjective   Patient seen this morning.  Laying in bed.  Wife is present at bedside.  Denies any complaints this morning.  Has ambulated yesterday in the hallway and states he did well.  No bowel movements overnight, no signs of bleeding.  Hemoglobin has been stable.      Review of Systems      GENERAL: No fevers, chills, sweats.    RESPIRATORY: No cough, shortness of breath, hemoptysis or wheezing.   CVS: No chest pain, palpitations, orthopnea, dyspnea on exertion   GI: No melena or hematochezia. No abdominal pain. No nausea, vomiting, constipation, diarrhea      As above  Objective   Objective   Vital Signs  Temp:  [97.5 °F (36.4 °C)-98 °F (36.7 °C)] 97.7 °F (36.5 °C)  Heart Rate:  [70-88] 82  Resp:  [18-20] 18  BP: (111-132)/(52-68) 118/68  SpO2:  [98 %-100 %] 99 %  on   ;   Device (Oxygen Therapy): room air  Body mass index is 21.68 kg/m².  Physical Exam    General: Alert , ill-appearing  HEENT: Normocephalic, atraumatic  CV: Rhythm irregularly irregular, mechanical S2  Lungs: Clear to auscultation bilaterally, systolic murmur in the right upper sternal border, no crackles or wheezes  Abdomen: Soft, nontender, nondistended  Neuro: Alert and oriented x3, no FND appreciated, generalized weakness in all extremities, 4+/5 strength in left upper and lower extremity.  Skin: Bruising noted seen right lower abdomen  Extremities: No lower extremity edema.    Results Review     I reviewed the patient's new clinical results.  Results from last 7 days   Lab Units 21  0613 21  0624 21  0354 21  0512   WBC 10*3/mm3 5.42 5.28 5.94 6.08   HEMOGLOBIN g/dL 10.1* 9.3* 8.7* 8.6*   PLATELETS 10*3/mm3 258 224 233 234     Results from last 7 days   Lab Units 21  0613 21  0624 21  0354 21  0512   SODIUM mmol/L 138  140 142 140   POTASSIUM mmol/L 3.7 4.0 3.7 3.6   CHLORIDE mmol/L 102 106 109* 107   CO2 mmol/L 24.9 27.4 24.5 23.5   BUN mg/dL 22 18 19 20   CREATININE mg/dL 1.21 1.20 1.04 1.20   GLUCOSE mg/dL 156* 136* 123* 140*   Estimated Creatinine Clearance: 47.4 mL/min (by C-G formula based on SCr of 1.21 mg/dL).  Results from last 7 days   Lab Units 11/14/21  0613 11/13/21  0624 11/12/21  0354 11/11/21  0512   ALBUMIN g/dL 3.00* 2.40* 2.60* 2.60*     Results from last 7 days   Lab Units 11/14/21  0613 11/13/21  0624 11/12/21 0354 11/11/21  0512   CALCIUM mg/dL 8.3* 8.1* 8.0* 8.2*   ALBUMIN g/dL 3.00* 2.40* 2.60* 2.60*   MAGNESIUM mg/dL 1.9  --   --   --    PHOSPHORUS mg/dL 3.1 3.0 2.7 2.5       COVID19   Date Value Ref Range Status   11/02/2021 Not Detected Not Detected - Ref. Range Final   10/26/2021 Not Detected Not Detected - Ref. Range Final     Glucose   Date/Time Value Ref Range Status   11/14/2021 0602 179 (H) 70 - 130 mg/dL Final     Comment:     Meter: [id number] : [id number] Gomez Bertha NA   11/13/2021 2040 183 (H) 70 - 130 mg/dL Final     Comment:     Meter: [id number] : [id number] Gomez Bertha NA   11/13/2021 1652 231 (H) 70 - 130 mg/dL Final     Comment:     Meter: [id number] : [id number] Alfonso Kisha NA   11/13/2021 1126 206 (H) 70 - 130 mg/dL Final     Comment:     Meter: [id number] : [id number] Alfonso Kisha NA   11/13/2021 0626 143 (H) 70 - 130 mg/dL Final     Comment:     Meter: [id number] : [id number] Kim Raya NA   11/12/2021 2020 180 (H) 70 - 130 mg/dL Final     Comment:     Meter: [id number] : [id number] Kim Raya MELO   11/12/2021 1601 114 70 - 130 mg/dL Final     Comment:     Meter: [id number] : [id number] Asher Mcclellan NA       FL Video Swallow With Speech Single Contrast  Narrative: VIDEO SWALLOW STUDY     HISTORY: Dysphagia.     2 minutes 13 seconds fluoroscopy was provided for the speech pathologist  during a video swallow study. [id number] images were saved.      Aspiration was visualized with multiple consistencies. Penetration was  also seen.     Impression: Fluoroscopy was provided for the speech pathologist during a  video swallow study. For full details please see the speech pathology  report     This report was finalized on 11/12/2021 2:34 PM by Dr. Antonio Hart M.D.       Scheduled Medications  atorvastatin, 40 mg, Oral, Daily  dextrose, 25 g, Intravenous, Once  digoxin, 125 mcg, Oral, Daily  ferrous sulfate, 325 mg, Oral, Daily With Breakfast  furosemide, 40 mg, Oral, BID  guaiFENesin, 600 mg, Oral, Q12H  insulin lispro, 0-9 Units, Subcutaneous, 4x Daily With Meals & Nightly  magnesium oxide, 400 mg, Oral, BID  metoprolol succinate XL, 25 mg, Oral, Daily  pantoprazole, 40 mg, Intravenous, Q12H  sodium bicarbonate, 650 mg, Oral, BID  warfarin, 7.5 mg, Oral, Once    Infusions  heparin (porcine), 12 Units/kg/hr, Last Rate: 11 Units/kg/hr (11/14/21 0929)  hold, 1 each  Pharmacy to dose warfarin,   sodium chloride, 30 mL/hr, Last Rate: Stopped (11/11/21 1928)    Diet  Diet Dysphagia; IV - Mechanical Soft No Mixed Consistencies; Consistent Carbohydrate       Assessment/Plan     Active Hospital Problems    Diagnosis  POA   • **Lower GI bleed [K92.2]  Yes   • Angiodysplasia of colon without hemorrhage [K55.20]  Unknown   • Rectus sheath hematoma [S30.1XXA]  No   • Acute posthemorrhagic anemia [D62]  Yes   • History of nephrectomy [Z90.5]  Not Applicable   • Abnormal urinalysis [R82.90]  Yes   • Recurrent right pleural effusion [J90]  Yes   • 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       86 yo gentleman with h/o DM2, AFib, Mech AVR (on warfarin), HTN, CKD3, h/o RCC s/p right nephrectomy and h/o CVA with mild residual left hemiparesis, admitted here from 10/23 to 10/30 for \"obscure\" GI bleed and bilateral pleural effusions (transudate), who presented to ER with c/o rectal bleeding and generalized weakness. Found to have drop in Hgb from 9.3 to 7.2. Stool grossly bloody in ER.  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       GI bleed with melena and bright red rectal bleeding,  -GI consulted  -Status post colonoscopy 11/9 , AVM was found and treated APC and clip.    -Start heparin drip on 11/11, hemoglobin-stable, no further signs of bleeding.  -Okay to restart warfarin per GI  -Restart warfarin 11/13, consult pharmacy  -Continue to trend H&H and transfuse as needed.   -01/14 -hemoglobin is better at 10.1 , continue heparin drip and warfarin, INR 1.21 this morning, pharmacy dosing, heparin to be stopped once INR is above 2.0 per cardiology recommendations    CKD stage 3  -Creatinine has been stable around 1.2  -Nephrology following  -Daily BMPs    Iron deficiency anemia due to chronic GI blood loss  -Ferritin 28.4 on 11/2/2021  On iron appointment       DVT prophylaxis. on heparin drip.  Full code.  Discussed with patient, family, nursing staff and ST.  Anticipate discharge home with spouse , continue [employer]  HH at discharge , likely  in 1-2 days      Bowen Coughlin MD  [facility]  11/14/21  10:08 EST      "

## 2021-11-14 NOTE — PROGRESS NOTES
"      [facility]         Dr Castillo Sayied   LOS: 12 days   Patient Care Team:  Rubin Hinkle APRN as PCP - General (Family Medicine)  Audra Vazquez RPH as Pharmacist  Vasquez Niño PharmD as Pharmacist (Pharmacy)    Chief Complaint: Bilateral pleural effusion right rater than left with acute GI bleed lower on anticoagulation for mechanical aortic valve    Interval History: Resting comfortably remains on room air breathing remains stable.  No GI bleeding as such reported.  Coumadin started yesterday.    REVIEW OF SYSTEMS:   CARDIOVASCULAR: No chest pain, chest pressure or chest discomfort. No palpitations or edema.   RESPIRATORY: Short of breath with activity  GASTROINTESTINAL: No anorexia, nausea, vomiting or diarrhea. No abdominal pain or blood.   HEMATOLOGIC: No bleeding or bruising.     Ventilator/Non-Invasive Ventilation Settings (From admission, onward)            None            Vital Signs  Temp:  [97.5 °F (36.4 °C)-98 °F (36.7 °C)] 97.7 °F (36.5 °C)  Heart Rate:  [70-88] 82  Resp:  [18-20] 18  BP: (111-132)/(52-68) 118/68    Intake/Output Summary (Last 24 hours) at 11/14/2021 1240  Last data filed at 11/14/2021 0500  Gross per 24 hour   Intake 240 ml   Output 1125 ml   Net -885 ml     Flowsheet Rows      First Filed Value   Admission Height 182.9 cm (72\") Documented at 11/02/2021 0344   Admission Weight 81.6 kg (180 lb) Documented at 11/02/2021 0344          Physical Exam:  Patient is examined using the personal protective equipment as per guidelines from infection control for this particular patient as enacted.  Hand hygiene was performed before and after patient interaction.   General Appearance:    Alert, cooperative, in no acute distress.  Following simple commands  Neck midline trachea, no thyromegaly   Lungs:    Diminished breath sounds bilaterally right rater than left no labored breathing    Heart:    Regular rhythm and normal rate, normal S1 and S2, no            murmur, no gallop, " no rub, no click   Chest Wall:    No abnormalities observed   Abdomen:     Normal bowel sounds, no masses, no organomegaly, soft        nontender, nondistended, no guarding, no rebound                tenderness   Extremities:   Moves all extremities well, no edema, no cyanosis, no             redness  CNS no focal neurological deficits normal sensory exam  Skin no rashes no nodules  Musculoskeletal no cyanosis no clubbing normal range of motion     Results Review:        Results from last 7 days   Lab Units 11/14/21 0613 11/13/21 0624 11/13/21 0624 11/12/21 0354 11/12/21 0354   SODIUM mmol/L 138  --  140  --  142   POTASSIUM mmol/L 3.7  --  4.0  --  3.7   CHLORIDE mmol/L 102  --  106  --  109*   CO2 mmol/L 24.9  --  27.4  --  24.5   BUN mg/dL 22  --  18  --  19   CREATININE mg/dL 1.21  --  1.20  --  1.04   GLUCOSE mg/dL 156*   < > 136*   < > 123*   CALCIUM mg/dL 8.3*  --  8.1*  --  8.0*    < > = values in this interval not displayed.         Results from last 7 days   Lab Units 11/14/21 0613 11/13/21 0624 11/12/21 0354   WBC 10*3/mm3 5.42 5.28 5.94   HEMOGLOBIN g/dL 10.1* 9.3* 8.7*   HEMATOCRIT % 32.3* 29.6* 29.2*   PLATELETS 10*3/mm3 258 224 233     Results from last 7 days   Lab Units 11/14/21 0613 11/13/21 0624 11/12/21 0354   INR  1.21* 1.27* 1.46*   APTT seconds 56.7* 74.5* 81.1*         Results from last 7 days   Lab Units 11/14/21 0613   MAGNESIUM mg/dL 1.9               I reviewed the patient's new clinical results.  I personally viewed and interpreted the patient's chest x-ray.        Medication Review:   atorvastatin, 40 mg, Oral, Daily  dextrose, 25 g, Intravenous, Once  digoxin, 125 mcg, Oral, Daily  ferrous sulfate, 325 mg, Oral, Daily With Breakfast  furosemide, 40 mg, Oral, BID  guaiFENesin, 600 mg, Oral, Q12H  insulin lispro, 0-9 Units, Subcutaneous, 4x Daily With Meals & Nightly  magnesium oxide, 400 mg, Oral, BID  metoprolol succinate XL, 25 mg, Oral, Daily  pantoprazole, 40 mg,  Intravenous, Q12H  sodium bicarbonate, 650 mg, Oral, BID  warfarin, 7.5 mg, Oral, Once        heparin (porcine), 12 Units/kg/hr, Last Rate: 11 Units/kg/hr (11/14/21 0929)  hold, 1 each  Pharmacy to dose warfarin,   sodium chloride, 30 mL/hr, Last Rate: Stopped (11/11/21 1928)        ASSESSMENT:   Rt> Lt pl effusion s/p thora 11/11 - 1.6L; transudate  Acute GI bleed; lower  Previous mechanical aortic valve replacement  Pulmonary hypertension  A. fib  Anemia    PLAN:  Status post thoracentesis.  Fluid appears to be transudative likely from CHF  Continue adjustment of diuresis  Anticoagulation has been restarted currently tolerating well.  Currently on room air tolerating well  GI bleeding currently reason for patient to stay in the hospital  Monitor pleural effusion closely  We will recommend follow-up chest x-ray down the road in 1 to 2 weeks to ensure resolution of effusion.  Discussed with wife at bedside      Jonathan Blackmon MD  11/14/21  12:40 EST

## 2021-11-14 NOTE — PLAN OF CARE
Problem: Adult Inpatient Plan of Care  Goal: Plan of Care Review  Flowsheets (Taken 11/14/2021 8981)  Progress: improving  Plan of Care Reviewed With: patient  Outcome Summary: VSS. Pt denies pain. Ptsat up in the chair today and worked with PT. Heparin drip continued awaiting PTT to be drawn this AM. Has been therapeutic. Pt started back on coumadin and had first dose yesterday. Pt slept well this evening. No complaints. Pt currently up in recliner and daily weight also obtained while standing. Will continue to monitor.   Goal Outcome Evaluation:  Plan of Care Reviewed With: patient        Progress: improving  Outcome Summary: VSS. Pt denies pain. Ptsat up in the chair today and worked with PT. Heparin drip continued awaiting PTT to be drawn this AM. Has been therapeutic. Pt started back on coumadin and had first dose yesterday. Pt slept well this evening. No complaints. Pt currently up in recliner and daily weight also obtained while standing. Will continue to monitor.

## 2021-11-14 NOTE — PLAN OF CARE
Problem: Adult Inpatient Plan of Care  Goal: Plan of Care Review  Outcome: Ongoing, Progressing   Goal Outcome Evaluation:   Vss.Ambulate in roque x's 2 with assist, up to chair for meals.Remains on heparin gtt.No c/o pain or n/v.

## 2021-11-15 ENCOUNTER — HOME CARE VISIT (OUTPATIENT)
Dept: HOME HEALTH SERVICES | Facility: HOME HEALTHCARE | Age: 86
End: 2021-11-15

## 2021-11-15 LAB
ALBUMIN SERPL-MCNC: 2.7 G/DL (ref 3.5–5.2)
ANION GAP SERPL CALCULATED.3IONS-SCNC: 9.8 MMOL/L (ref 5–15)
APTT PPP: 108.1 SECONDS (ref 22.7–35.4)
APTT PPP: 68.9 SECONDS (ref 22.7–35.4)
BASOPHILS # BLD AUTO: 0.04 10*3/MM3 (ref 0–0.2)
BASOPHILS NFR BLD AUTO: 0.8 % (ref 0–1.5)
BUN SERPL-MCNC: 27 MG/DL (ref 8–23)
BUN/CREAT SERPL: 18.2 (ref 7–25)
CALCIUM SPEC-SCNC: 8.1 MG/DL (ref 8.6–10.5)
CHLORIDE SERPL-SCNC: 104 MMOL/L (ref 98–107)
CO2 SERPL-SCNC: 28.2 MMOL/L (ref 22–29)
CREAT SERPL-MCNC: 1.48 MG/DL (ref 0.76–1.27)
DEPRECATED RDW RBC AUTO: 59.9 FL (ref 37–54)
EOSINOPHIL # BLD AUTO: 0.3 10*3/MM3 (ref 0–0.4)
EOSINOPHIL NFR BLD AUTO: 5.6 % (ref 0.3–6.2)
ERYTHROCYTE [DISTWIDTH] IN BLOOD BY AUTOMATED COUNT: 20.3 % (ref 12.3–15.4)
GFR SERPL CREATININE-BSD FRML MDRD: 45 ML/MIN/1.73
GLUCOSE BLDC GLUCOMTR-MCNC: 131 MG/DL (ref 70–130)
GLUCOSE BLDC GLUCOMTR-MCNC: 194 MG/DL (ref 70–130)
GLUCOSE BLDC GLUCOMTR-MCNC: 209 MG/DL (ref 70–130)
GLUCOSE BLDC GLUCOMTR-MCNC: 266 MG/DL (ref 70–130)
GLUCOSE SERPL-MCNC: 111 MG/DL (ref 65–99)
HCT VFR BLD AUTO: 28.7 % (ref 37.5–51)
HGB BLD-MCNC: 9 G/DL (ref 13–17.7)
IMM GRANULOCYTES # BLD AUTO: 0.02 10*3/MM3 (ref 0–0.05)
IMM GRANULOCYTES NFR BLD AUTO: 0.4 % (ref 0–0.5)
INR PPP: 1.27 (ref 0.9–1.1)
LYMPHOCYTES # BLD AUTO: 0.99 10*3/MM3 (ref 0.7–3.1)
LYMPHOCYTES NFR BLD AUTO: 18.6 % (ref 19.6–45.3)
MAGNESIUM SERPL-MCNC: 1.9 MG/DL (ref 1.6–2.4)
MCH RBC QN AUTO: 25.4 PG (ref 26.6–33)
MCHC RBC AUTO-ENTMCNC: 31.4 G/DL (ref 31.5–35.7)
MCV RBC AUTO: 81.1 FL (ref 79–97)
MONOCYTES # BLD AUTO: 0.42 10*3/MM3 (ref 0.1–0.9)
MONOCYTES NFR BLD AUTO: 7.9 % (ref 5–12)
NEUTROPHILS NFR BLD AUTO: 3.56 10*3/MM3 (ref 1.7–7)
NEUTROPHILS NFR BLD AUTO: 66.7 % (ref 42.7–76)
NRBC BLD AUTO-RTO: 0 /100 WBC (ref 0–0.2)
PHOSPHATE SERPL-MCNC: 3.3 MG/DL (ref 2.5–4.5)
PLATELET # BLD AUTO: 214 10*3/MM3 (ref 140–450)
PMV BLD AUTO: 11 FL (ref 6–12)
POTASSIUM SERPL-SCNC: 3.7 MMOL/L (ref 3.5–5.2)
PROTHROMBIN TIME: 15.7 SECONDS (ref 11.7–14.2)
RBC # BLD AUTO: 3.54 10*6/MM3 (ref 4.14–5.8)
SODIUM SERPL-SCNC: 142 MMOL/L (ref 136–145)
WBC # BLD AUTO: 5.33 10*3/MM3 (ref 3.4–10.8)

## 2021-11-15 PROCEDURE — 85730 THROMBOPLASTIN TIME PARTIAL: CPT | Performed by: INTERNAL MEDICINE

## 2021-11-15 PROCEDURE — 82962 GLUCOSE BLOOD TEST: CPT

## 2021-11-15 PROCEDURE — 63710000001 INSULIN LISPRO (HUMAN) PER 5 UNITS: Performed by: INTERNAL MEDICINE

## 2021-11-15 PROCEDURE — 85025 COMPLETE CBC W/AUTO DIFF WBC: CPT | Performed by: NURSE PRACTITIONER

## 2021-11-15 PROCEDURE — 80069 RENAL FUNCTION PANEL: CPT | Performed by: INTERNAL MEDICINE

## 2021-11-15 PROCEDURE — 36415 COLL VENOUS BLD VENIPUNCTURE: CPT | Performed by: INTERNAL MEDICINE

## 2021-11-15 PROCEDURE — 99232 SBSQ HOSP IP/OBS MODERATE 35: CPT | Performed by: NURSE PRACTITIONER

## 2021-11-15 PROCEDURE — 85730 THROMBOPLASTIN TIME PARTIAL: CPT | Performed by: STUDENT IN AN ORGANIZED HEALTH CARE EDUCATION/TRAINING PROGRAM

## 2021-11-15 PROCEDURE — 25010000002 HEPARIN (PORCINE) PER 1000 UNITS: Performed by: NURSE PRACTITIONER

## 2021-11-15 PROCEDURE — 85610 PROTHROMBIN TIME: CPT | Performed by: INTERNAL MEDICINE

## 2021-11-15 PROCEDURE — 83735 ASSAY OF MAGNESIUM: CPT | Performed by: STUDENT IN AN ORGANIZED HEALTH CARE EDUCATION/TRAINING PROGRAM

## 2021-11-15 RX ORDER — PANTOPRAZOLE SODIUM 40 MG/1
40 TABLET, DELAYED RELEASE ORAL EVERY 12 HOURS
Status: DISCONTINUED | OUTPATIENT
Start: 2021-11-15 | End: 2021-11-16 | Stop reason: HOSPADM

## 2021-11-15 RX ORDER — FUROSEMIDE 40 MG/1
40 TABLET ORAL DAILY
Status: DISCONTINUED | OUTPATIENT
Start: 2021-11-16 | End: 2021-11-16 | Stop reason: HOSPADM

## 2021-11-15 RX ORDER — WARFARIN SODIUM 10 MG/1
10 TABLET ORAL
Status: COMPLETED | OUTPATIENT
Start: 2021-11-15 | End: 2021-11-15

## 2021-11-15 RX ADMIN — SODIUM BICARBONATE 650 MG: 650 TABLET ORAL at 09:28

## 2021-11-15 RX ADMIN — PANTOPRAZOLE SODIUM 40 MG: 40 TABLET, DELAYED RELEASE ORAL at 09:28

## 2021-11-15 RX ADMIN — MAGNESIUM OXIDE 400 MG (241.3 MG MAGNESIUM) TABLET 400 MG: TABLET at 19:48

## 2021-11-15 RX ADMIN — WARFARIN 10 MG: 10 TABLET ORAL at 17:36

## 2021-11-15 RX ADMIN — INSULIN LISPRO 6 UNITS: 100 INJECTION, SOLUTION INTRAVENOUS; SUBCUTANEOUS at 17:36

## 2021-11-15 RX ADMIN — ATORVASTATIN CALCIUM 40 MG: 20 TABLET, FILM COATED ORAL at 09:28

## 2021-11-15 RX ADMIN — SODIUM BICARBONATE 650 MG: 650 TABLET ORAL at 19:47

## 2021-11-15 RX ADMIN — MAGNESIUM OXIDE 400 MG (241.3 MG MAGNESIUM) TABLET 400 MG: TABLET at 09:29

## 2021-11-15 RX ADMIN — METOPROLOL SUCCINATE 25 MG: 25 TABLET, EXTENDED RELEASE ORAL at 09:29

## 2021-11-15 RX ADMIN — GUAIFENESIN 600 MG: 600 TABLET, EXTENDED RELEASE ORAL at 19:48

## 2021-11-15 RX ADMIN — INSULIN LISPRO 2 UNITS: 100 INJECTION, SOLUTION INTRAVENOUS; SUBCUTANEOUS at 21:38

## 2021-11-15 RX ADMIN — PANTOPRAZOLE SODIUM 40 MG: 40 TABLET, DELAYED RELEASE ORAL at 19:48

## 2021-11-15 RX ADMIN — INSULIN LISPRO 4 UNITS: 100 INJECTION, SOLUTION INTRAVENOUS; SUBCUTANEOUS at 12:26

## 2021-11-15 RX ADMIN — HEPARIN SODIUM 11 UNITS/KG/HR: 10000 INJECTION, SOLUTION INTRAVENOUS at 03:17

## 2021-11-15 RX ADMIN — GUAIFENESIN 600 MG: 600 TABLET, EXTENDED RELEASE ORAL at 09:29

## 2021-11-15 RX ADMIN — FERROUS SULFATE TAB 325 MG (65 MG ELEMENTAL FE) 325 MG: 325 (65 FE) TAB at 09:29

## 2021-11-15 RX ADMIN — FUROSEMIDE 40 MG: 40 TABLET ORAL at 09:29

## 2021-11-15 RX ADMIN — DIGOXIN 125 MCG: 250 TABLET ORAL at 09:28

## 2021-11-15 NOTE — PROGRESS NOTES
"      [facility]         Dr Castillo Sayied   LOS: 13 days   Patient Care Team:  Rubin Hinkle APRN as PCP - General (Family Medicine)  Audra Vazquez RPH as Pharmacist  Vasquez Niño PharmD as Pharmacist (Pharmacy)    Chief Complaint: Bilateral pleural effusion right rater than left with acute GI bleed lower on anticoagulation for mechanical aortic valve    Interval History: Resting comfortably remains on room air breathing remains stable.  No GI bleeding as such reported.  Coumadin has been started and currently tolerating well    REVIEW OF SYSTEMS:   CARDIOVASCULAR: No chest pain, chest pressure or chest discomfort. No palpitations or edema.   RESPIRATORY: Short of breath with activity  GASTROINTESTINAL: No anorexia, nausea, vomiting or diarrhea. No abdominal pain or blood.   HEMATOLOGIC: No bleeding or bruising.     Ventilator/Non-Invasive Ventilation Settings (From admission, onward)            None            Vital Signs  Temp:  [97.3 °F (36.3 °C)-98.8 °F (37.1 °C)] 98.8 °F (37.1 °C)  Heart Rate:  [69-84] 84  Resp:  [16-18] 16  BP: (105-129)/(54-70) 120/70    Intake/Output Summary (Last 24 hours) at 11/15/2021 1257  Last data filed at 11/15/2021 0900  Gross per 24 hour   Intake 390 ml   Output 1200 ml   Net -810 ml     Flowsheet Rows      First Filed Value   Admission Height 182.9 cm (72\") Documented at 11/02/2021 0344   Admission Weight 81.6 kg (180 lb) Documented at 11/02/2021 0344          Physical Exam:  Patient is examined using the personal protective equipment as per guidelines from infection control for this particular patient as enacted.  Hand hygiene was performed before and after patient interaction.   General Appearance:    Alert, cooperative, in no acute distress.  Following simple commands  Neck midline trachea, no thyromegaly   Lungs:    Diminished breath sounds bilaterally right rater than left no labored breathing    Heart:    Regular rhythm and normal rate, normal S1 and S2, no   "          murmur, no gallop, no rub, no click   Chest Wall:    No abnormalities observed   Abdomen:     Normal bowel sounds, no masses, no organomegaly, soft        nontender, nondistended, no guarding, no rebound                tenderness   Extremities:   Moves all extremities well, no edema, no cyanosis, no             redness  CNS no focal neurological deficits normal sensory exam  Skin no rashes no nodules  Musculoskeletal no cyanosis no clubbing normal range of motion     Results Review:        Results from last 7 days   Lab Units 11/15/21  0055 11/14/21  0613 11/14/21  0613 11/13/21  0624 11/13/21  0624   SODIUM mmol/L 142  --  138  --  140   POTASSIUM mmol/L 3.7  --  3.7  --  4.0   CHLORIDE mmol/L 104  --  102  --  106   CO2 mmol/L 28.2  --  24.9  --  27.4   BUN mg/dL 27*  --  22  --  18   CREATININE mg/dL 1.48*  --  1.21  --  1.20   GLUCOSE mg/dL 111*   < > 156*   < > 136*   CALCIUM mg/dL 8.1*  --  8.3*  --  8.1*    < > = values in this interval not displayed.         Results from last 7 days   Lab Units 11/15/21  0055 11/14/21  0613 11/13/21  0624   WBC 10*3/mm3 5.33 5.42 5.28   HEMOGLOBIN g/dL 9.0* 10.1* 9.3*   HEMATOCRIT % 28.7* 32.3* 29.6*   PLATELETS 10*3/mm3 214 258 224     Results from last 7 days   Lab Units 11/15/21  0925 11/15/21  0055 11/14/21  1529 11/14/21 0613 11/14/21 0613 11/13/21 0624 11/13/21 0624   INR   --  1.27*  --   --  1.21*  --  1.27*   APTT seconds 68.9* 108.1* 53.5*   < > 56.7*   < > 74.5*    < > = values in this interval not displayed.         Results from last 7 days   Lab Units 11/15/21  0055   MAGNESIUM mg/dL 1.9               I reviewed the patient's new clinical results.  I personally viewed and interpreted the patient's chest x-ray.        Medication Review:   atorvastatin, 40 mg, Oral, Daily  dextrose, 25 g, Intravenous, Once  digoxin, 125 mcg, Oral, Daily  ferrous sulfate, 325 mg, Oral, Daily With Breakfast  [START ON 11/16/2021] furosemide, 40 mg, Oral,  Daily  guaiFENesin, 600 mg, Oral, Q12H  insulin lispro, 0-9 Units, Subcutaneous, 4x Daily With Meals & Nightly  magnesium oxide, 400 mg, Oral, BID  metoprolol succinate XL, 25 mg, Oral, Daily  pantoprazole, 40 mg, Oral, Q12H  sodium bicarbonate, 650 mg, Oral, BID  warfarin, 10 mg, Oral, Once        heparin (porcine), 12 Units/kg/hr, Last Rate: 11 Units/kg/hr (11/15/21 1011)  hold, 1 each  Pharmacy to dose warfarin,   sodium chloride, 30 mL/hr, Last Rate: Stopped (11/11/21 1928)        ASSESSMENT:   Rt> Lt pl effusion s/p thora 11/11 - 1.6L; transudate  Acute GI bleed; lower  Previous mechanical aortic valve replacement  Pulmonary hypertension  A. fib  Anemia    PLAN:  Status post thoracentesis.  Fluid appears to be transudative likely from CHF.  I will repeat chest x-ray in the morning to see if the fluid is reaccumulating.  Continue adjustment of diuresis  Anticoagulation has been restarted currently tolerating well.  Currently on room air tolerating well  GI bleeding currently reason for patient to stay in the hospital  Monitor pleural effusion closely  We will recommend follow-up chest x-ray down the road in 1 to 2 weeks to ensure resolution of effusion.  Discussed with wife at bedside  If chest x-ray stable in the morning with no significant reaccumulation no objections to discharge      Jonathan Blackmon MD  11/15/21  12:57 EST

## 2021-11-15 NOTE — PROGRESS NOTES
[facility] Progress Note      Patient Name: Francisco Sequeira  : 1935  MRN: [id number]  Primary Care Physician:  Rubin Hinkle APRN  Date of admission: 2021    Subjective     Interval History:   Feeling well.  Denies any chest pain or shortness of breath  No melena  Review of Systems:   As noted above    Objective     Vitals:   Temp:  [97.3 °F (36.3 °C)-98.8 °F (37.1 °C)] 98.8 °F (37.1 °C)  Heart Rate:  [69-84] 84  Resp:  [16-18] 16  BP: (105-129)/(54-70) 120/70  Flow (L/min):  [1] 1    Intake/Output Summary (Last 24 hours) at 11/15/2021 1108  Last data filed at 11/15/2021 0900  Gross per 24 hour   Intake 390 ml   Output 1200 ml   Net -810 ml       Physical Exam:    General Appearance: alert, oriented x 3, no acute distress   Skin: warm and dry  HEENT: oral mucosa normal, nonicteric sclera  Neck: supple, no JVD  Lungs: CTA  Heart: RRR, normal S1 and S2  Abdomen: soft, nontender, nondistended  : no palpable bladder  Extremities: no edema, cyanosis or clubbing  Neuro: normal speech and mental status     Scheduled Meds:     atorvastatin, 40 mg, Oral, Daily  dextrose, 25 g, Intravenous, Once  digoxin, 125 mcg, Oral, Daily  ferrous sulfate, 325 mg, Oral, Daily With Breakfast  furosemide, 40 mg, Oral, BID  guaiFENesin, 600 mg, Oral, Q12H  insulin lispro, 0-9 Units, Subcutaneous, 4x Daily With Meals & Nightly  magnesium oxide, 400 mg, Oral, BID  metoprolol succinate XL, 25 mg, Oral, Daily  pantoprazole, 40 mg, Oral, Q12H  sodium bicarbonate, 650 mg, Oral, BID  warfarin, 10 mg, Oral, Once      IV Meds:   heparin (porcine), 12 Units/kg/hr, Last Rate: 11 Units/kg/hr (11/15/21 1011)  hold, 1 each  Pharmacy to dose warfarin,   sodium chloride, 30 mL/hr, Last Rate: Stopped (21)        Results Reviewed:   I have personally reviewed the results from the time of this admission to 11/15/2021 11:08 EST     Results from last 7 days   Lab Units 11/15/21  0055 21  0613  11/13/21  0624   SODIUM mmol/L 142 138 140   POTASSIUM mmol/L 3.7 3.7 4.0   CHLORIDE mmol/L 104 102 106   CO2 mmol/L 28.2 24.9 27.4   BUN mg/dL 27* 22 18   CREATININE mg/dL 1.48* 1.21 1.20   CALCIUM mg/dL 8.1* 8.3* 8.1*   GLUCOSE mg/dL 111* 156* 136*       Estimated Creatinine Clearance: 35.9 mL/min (A) (by C-G formula based on SCr of 1.48 mg/dL (H)).    Results from last 7 days   Lab Units 11/15/21  0055 11/14/21  0613 11/13/21  0624   MAGNESIUM mg/dL 1.9 1.9  --    PHOSPHORUS mg/dL 3.3 3.1 3.0             Results from last 7 days   Lab Units 11/15/21  0055 11/14/21  0613 11/13/21  0624 11/12/21  0354 11/11/21  0512   WBC 10*3/mm3 5.33 5.42 5.28 5.94 6.08   HEMOGLOBIN g/dL 9.0* 10.1* 9.3* 8.7* 8.6*   PLATELETS 10*3/mm3 214 258 224 233 234       Results from last 7 days   Lab Units 11/15/21  0055 11/14/21  0613 11/13/21  0624 11/12/21  0354 11/11/21  0512   INR  1.27* 1.21* 1.27* 1.46* 1.57*       Assessment / Plan     ASSESSMENT:  BYRON on CKD3, non-oliguric, BYRON resolved (ATN from hypovolemia, blood loss, hypotension, contrast), peak Cr 2.0, down to 1.2 now up to 1.5 after increased dose of Lasix.  Acute GI bleed with bloody stools noted this admission and tagged RBC scan showing bleed in the RLQ consistent with distal ileum or proximal colon.  Colonoscopy performed with AVM that was cauterized on IV heparin  Vol overload - improved, on diuretics  History of mechanical AVR and Atrial Fibrillation anticoagulated with warfarin. His warfarin is being held and IV heparin drip infusing  Hypertension, controlled  Recurrent right pleural effusion; transudative. Repeat thoracentesis performed on 11/11, 1.6L removed   Type II diabetes. Hgb A1c 5.2 last admission.  History of CVA with left side hemiparesis.  9.         Metabolic acidosis - overcorrected, cut nahco3 dose down to 650 BID     Plan  -Decrease Lasix to 40 mg daily for now.  Increase Lasix to 40 in a.m. 20 p.m. tomorrow if creatinine improved.  -Plan to keep him in  hospital 1 more day to follow-up on kidney function  - follow up 1 month with Dr Dominguez    Thank you for involving us in the care of Francisco Sequeira.  Please feel free to call with any questions.    Laz Fernandes MD  11/15/21  11:08 [facility]    [facility]  [phone]      Much of this encounter note is an electronic transcription/translation of spoken language to printed text. The electronic translation of spoken language may permit erroneous, or at times, nonsensical words or phrases to be inadvertently transcribed; Although I have reviewed the note for such errors, some may still exist.

## 2021-11-15 NOTE — PLAN OF CARE
Problem: Adult Inpatient Plan of Care  Goal: Plan of Care Review  Outcome: Ongoing, Progressing  Flowsheets (Taken 11/15/2021 1710)  Progress: no change  Plan of Care Reviewed With: patient  Outcome Summary: no acute changes today. stayed d/t increase in creatinine. heparin gtt therapeutic. plan for d/c tomorrow if creat better. VSS. continue to monitor   Goal Outcome Evaluation:

## 2021-11-15 NOTE — PROGRESS NOTES
Pharmacy Consult: Warfarin Dosing/ Monitoring    Francisco Sequeira is a 85 y.o. male, estimated creatinine clearance is 35.9 mL/min (A) (by C-G formula based on SCr of 1.48 mg/dL (H)). weighing 67.1 kg (147 lb 14.9 oz).     has a past medical history of Allergic rhinitis, Aortic valve insufficiency, Ascending aortic aneurysm (HCC), Atrial fibrillation (HCC), Bacteremia, Calcific aortic stenosis of bicuspid valve, Cardiac arrest (HCC), Cardiomyopathy (HCC), CKD (chronic kidney disease) stage 3, GFR 30-59 ml/min (HCC), Contact dermatitis due to poison ivy, Elbow fracture, Esophageal reflux, GERD (gastroesophageal reflux disease), Head injury, History of transfusion, Hyperglycemia, Hyperlipidemia, Hypertension, Kidney carcinoma (HCC), Nonischemic cardiomyopathy (HCC), Renal oncocytoma, Seasonal allergic reaction, Sinusitis, Syncope, Type 2 diabetes mellitus (HCC), and Visual field defect.    Social History     Tobacco Use    Smoking status: Former Smoker    Smokeless tobacco: Former User    Tobacco comment: caffeine use   Vaping Use    Vaping Use: Never used   Substance Use Topics    Alcohol use: Yes     Comment: occasional    Drug use: No       Results from last 7 days   Lab Units 11/15/21  0055 11/14/21  1529 11/14/21  0613 11/13/21  0624 11/12/21  0354 11/11/21  1953 11/11/21  1143 11/11/21  0512 11/10/21  0618 11/09/21  0603   INR  1.27*  --  1.21* 1.27* 1.46*  --   --  1.57* 1.73* 2.00*   APTT seconds 108.1* 53.5* 56.7* 74.5* 81.1* 95.1* 44.7*  --   --   --    HEMOGLOBIN g/dL 9.0*  --  10.1* 9.3* 8.7*  --   --  8.6* 9.1* 8.4*   HEMATOCRIT % 28.7*  --  32.3* 29.6* 29.2*  --   --  26.5* 30.0* 26.3*   PLATELETS 10*3/mm3 214  --  258 224 233  --   --  234 244 219     Results from last 7 days   Lab Units 11/15/21  0055 11/14/21  0613 11/13/21  0624   SODIUM mmol/L 142 138 140   POTASSIUM mmol/L 3.7 3.7 4.0   CHLORIDE mmol/L 104 102 106   CO2 mmol/L 28.2 24.9 27.4   BUN mg/dL 27* 22 18   CREATININE mg/dL 1.48* 1.21 1.20    CALCIUM mg/dL 8.1* 8.3* 8.1*   GLUCOSE mg/dL 111* 156* 136*     Anticoagulation history: MWF 7.5 mg, SuTThSa 5 mg outpatient.     Hospital Anticoagulation:  Consulting provider: Dr. Coughlin  Start date: restarted 11/13.  Indication: mechanical ht valve  Target INR: 2.5-3.5 (3.0-3.5 according to anticoag clinic)  Expected duration: lifetime   Bridge Therapy: yes            and unfractionated heparin IV infusion  Date 11/13 11/14 11/15          INR 1.27 1.21 1.27          Warfarin dose 7.5 7.5 mg 10mg            Potential drug interactions:   Acetaminophen-moderate-may increase bleeding risk  Pantoprazole-may increase INR  Boost supplements can lower INR with amount of vitamin K in product    Relevant nutrition status: mechanical soft w/ boost supplements    Other:     Education complete?/ Date: no/PTA medication followed by anticoag clinic    Assessment/Plan:  Dose 10mg today  Monitor pt/inr daily  Follow up tomorrow am after reviewing INR    Pharmacy will continue to follow until discharge or discontinuation of warfarin.   Christina Johnson [facility]  11/15/2021

## 2021-11-15 NOTE — PROGRESS NOTES
Name: Francisco Sequeira ADMIT: 2021   : 1935  PCP: Rubin Hinkle APRN    MRN: [id number] LOS: 13 days   AGE/SEX: 85 y.o. male  ROOM: [facility]     Subjective   Subjective   Patient seen this morning.  Sitting up in the chair.  Wife is at bedside.  No acute events overnight.  Does not have any complaints this morning.    Review of Systems        As above  Objective   Objective   Vital Signs  Temp:  [97.3 °F (36.3 °C)-98.8 °F (37.1 °C)] 98.8 °F (37.1 °C)  Heart Rate:  [69-84] 84  Resp:  [16-18] 16  BP: (105-129)/(54-70) 120/70  SpO2:  [96 %-100 %] 96 %  on  Flow (L/min):  [1] 1;   Device (Oxygen Therapy): room air  Body mass index is 20.06 kg/m².  Physical Exam    General: Alert , ill-appearing  HEENT: Normocephalic, atraumatic  CV: Rhythm irregularly irregular, mechanical S2  Lungs: Clear to auscultation bilaterally, systolic murmur in the right upper sternal border, no crackles or wheezes  Abdomen: Soft, nontender, nondistended  Neuro: Alert and oriented x3, no FND appreciated, generalized weakness in all extremities, 4+/5 strength in left upper and lower extremity.  Skin: Bruising noted seen right lower abdomen  Extremities: No lower extremity edema.    Results Review     I reviewed the patient's new clinical results.  Results from last 7 days   Lab Units 11/15/21  0055 11/14/21  0613 11/13/21  0624 11/12/21  035   WBC 10*3/mm3 5.33 5.42 5.28 5.94   HEMOGLOBIN g/dL 9.0* 10.1* 9.3* 8.7*   PLATELETS 10*3/mm3 214 258 224 233     Results from last 7 days   Lab Units 11/15/21  0055 11/14/21  0613 11/13/21  0624 11/12/21  035   SODIUM mmol/L 142 138 140 142   POTASSIUM mmol/L 3.7 3.7 4.0 3.7   CHLORIDE mmol/L 104 102 106 109*   CO2 mmol/L 28.2 24.9 27.4 24.5   BUN mg/dL 27* 22 18 19   CREATININE mg/dL 1.48* 1.21 1.20 1.04   GLUCOSE mg/dL 111* 156* 136* 123*   Estimated Creatinine Clearance: 35.9 mL/min (A) (by C-G formula based on SCr of 1.48 mg/dL (H)).  Results from last 7 days   Lab Units  11/15/21  0055 11/14/21  0613 11/13/21  0624 11/12/21  0354   ALBUMIN g/dL 2.70* 3.00* 2.40* 2.60*     Results from last 7 days   Lab Units 11/15/21  0055 11/14/21  0613 11/13/21  0624 11/12/21  0354   CALCIUM mg/dL 8.1* 8.3* 8.1* 8.0*   ALBUMIN g/dL 2.70* 3.00* 2.40* 2.60*   MAGNESIUM mg/dL 1.9 1.9  --   --    PHOSPHORUS mg/dL 3.3 3.1 3.0 2.7       COVID19   Date Value Ref Range Status   11/02/2021 Not Detected Not Detected - Ref. Range Final   10/26/2021 Not Detected Not Detected - Ref. Range Final     Glucose   Date/Time Value Ref Range Status   11/15/2021 0508 131 (H) 70 - 130 mg/dL Final     Comment:     Meter: [id number] : [id number] Dread Gutierrez NA   11/14/2021 2041 161 (H) 70 - 130 mg/dL Final     Comment:     Meter: [id number] : [id number] Benlana Amezcua NA   11/14/2021 1618 251 (H) 70 - 130 mg/dL Final     Comment:     RN Notified R and V Meter: [id number] : [id number] Mary Kaufman    11/14/2021 1137 174 (H) 70 - 130 mg/dL Final     Comment:     Meter: [id number] : [id number] Mary Kaufman    11/14/2021 0602 179 (H) 70 - 130 mg/dL Final     Comment:     Meter: [id number] : [id number] Gomez Bertha NA   11/13/2021 2040 183 (H) 70 - 130 mg/dL Final     Comment:     Meter: [id number] : [id number] Gomez Bertha NA   11/13/2021 1652 231 (H) 70 - 130 mg/dL Final     Comment:     Meter: [id number] : [id number] Alfonso ALEJO       FL Video Swallow With Speech Single Contrast  Narrative: VIDEO SWALLOW STUDY     HISTORY: Dysphagia.     2 minutes 13 seconds fluoroscopy was provided for the speech pathologist  during a video swallow study. [id number] images were saved.     Aspiration was visualized with multiple consistencies. Penetration was  also seen.     Impression: Fluoroscopy was provided for the speech pathologist during a  video swallow study. For full details please see the speech pathology  report     This report was finalized on 11/12/2021 2:34 PM by Dr. Antnoio Hart M.D.        Scheduled Medications  atorvastatin, 40 mg, Oral, Daily  dextrose, 25 g, Intravenous, Once  digoxin, 125 mcg, Oral, Daily  ferrous sulfate, 325 mg, Oral, Daily With Breakfast  furosemide, 40 mg, Oral, BID  guaiFENesin, 600 mg, Oral, Q12H  insulin lispro, 0-9 Units, Subcutaneous, 4x Daily With Meals & Nightly  magnesium oxide, 400 mg, Oral, BID  metoprolol succinate XL, 25 mg, Oral, Daily  pantoprazole, 40 mg, Oral, Q12H  sodium bicarbonate, 650 mg, Oral, BID  warfarin, 10 mg, Oral, Once    Infusions  heparin (porcine), 12 Units/kg/hr, Last Rate: 11 Units/kg/hr (11/15/21 1011)  hold, 1 each  Pharmacy to dose warfarin,   sodium chloride, 30 mL/hr, Last Rate: Stopped (11/11/21 1928)    Diet  Diet Dysphagia; IV - Mechanical Soft No Mixed Consistencies; Consistent Carbohydrate       Assessment/Plan     Active Hospital Problems    Diagnosis  POA   • **Lower GI bleed [K92.2]  Yes   • Angiodysplasia of colon without hemorrhage [K55.20]  Unknown   • Rectus sheath hematoma [S30.1XXA]  No   • Acute posthemorrhagic anemia [D62]  Yes   • History of nephrectomy [Z90.5]  Not Applicable   • Abnormal urinalysis [R82.90]  Yes   • Recurrent right pleural effusion [J90]  Yes   • 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       86 yo gentleman with h/o DM2, AFib, Mech AVR (on warfarin), HTN, CKD3, h/o RCC s/p right nephrectomy and h/o CVA with mild residual left hemiparesis, admitted here from 10/23 to 10/30  "for \"obscure\" GI bleed and bilateral pleural effusions (transudate), who presented to ER with c/o rectal bleeding and generalized weakness. Found to have drop in Hgb from 9.3 to 7.2. Stool grossly bloody in ER.  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       GI bleed with melena and bright red rectal bleeding,  -GI consulted  -Status post colonoscopy 11/9 , AVM was found and treated APC and clip.    -Start heparin drip on 11/11, hemoglobin-stable, no further signs of bleeding.  -Okay to restart warfarin per GI  -Restart warfarin 11/13, consult pharmacy  -Continue to trend H&H and transfuse as needed.   -, continue heparin drip and warfarin, INR 1.21 this morning, pharmacy dosing, heparin to be stopped once INR is above 2.0 per cardiology recommendations  -Hemoglobin is 9.0 today, down from 10.1 yesterday  -No signs of bleeding,  -We will repeat H&H this afternoon.    CKD stage 3  -Creatinine has been stable   -Nephrology following  -Daily BMPs  -Creatinine is up today 11/15 at 1.48,     Iron deficiency anemia due to chronic GI blood loss  -Ferritin 28.4 on 11/2/2021  -On iron supplement      DVT prophylaxis. on heparin drip.  Full code.  Discussed with patient, family, nursing staff   Anticipate discharge home with spouse , continue [facility]  HH at discharge , likely tomorrow 11/16 if INR is within normal limits and hemoglobin stable.      Much of this encounter note is done through dragon dictation. The dictation may permit erroneous, or at times, nonsensical words or phrases; Although I have reviewed the note for such errors, some may still exist          Bowen Coughlin MD  [facility]  11/15/21  10:51 EST      "

## 2021-11-15 NOTE — PROGRESS NOTES
"    Patient Name: Francisco Sequeira  :1935  85 y.o.      Patient Care Team:  Rubin Hinkle APRN as PCP - General (Family Medicine)  Audra Vazquez RPH as Pharmacist  Vasquez Niño PharmD as Pharmacist (Pharmacy)    Chief Complaint: follow up GIB, mechanical aortic valve    Interval History: he is doing well. He is ambulating more and feeling stronger. Hgb is stable. He is on heparin and warfarin. No s/s of bleeding in stool.     Objective   Vital Signs  Temp:  [97.3 °F (36.3 °C)-98.8 °F (37.1 °C)] 98.1 °F (36.7 °C)  Heart Rate:  [69-84] 80  Resp:  [16] 16  BP: (120-130)/(58-70) 130/59    Intake/Output Summary (Last 24 hours) at 11/15/2021 1437  Last data filed at 11/15/2021 1327  Gross per 24 hour   Intake 510 ml   Output 1200 ml   Net -690 ml     Flowsheet Rows      First Filed Value   Admission Height 182.9 cm (72\") Documented at 2021 0344   Admission Weight 81.6 kg (180 lb) Documented at 2021 0344          Physical Exam:   General Appearance:    Alert, cooperative, in no acute distress   Lungs:     Clear to auscultation.  Normal respiratory effort and rate.      Heart:    Regular rhythm and normal rate, normal S1 and S2, no murmurs, gallops or rubs.     Chest Wall:    No abnormalities observed   Abdomen:     Soft, nontender, positive bowel sounds.     Extremities:   no cyanosis, clubbing or edema.  No marked joint deformities.  Adequate musculoskeletal strength.       Results Review:    Results from last 7 days   Lab Units 11/15/21  0055   SODIUM mmol/L 142   POTASSIUM mmol/L 3.7   CHLORIDE mmol/L 104   CO2 mmol/L 28.2   BUN mg/dL 27*   CREATININE mg/dL 1.48*   GLUCOSE mg/dL 111*   CALCIUM mg/dL 8.1*         Results from last 7 days   Lab Units 11/15/21  0055   WBC 10*3/mm3 5.33   HEMOGLOBIN g/dL 9.0*   HEMATOCRIT % 28.7*   PLATELETS 10*3/mm3 214     Results from last 7 days   Lab Units 11/15/21  0925 11/15/21  0055 21  1529 21  0613 21  0613 21  0624 21  0624 "   INR   --  1.27*  --   --  1.21*  --  1.27*   APTT seconds 68.9* 108.1* 53.5*   < > 56.7*   < > 74.5*    < > = values in this interval not displayed.     Results from last 7 days   Lab Units 11/15/21  0055   MAGNESIUM mg/dL 1.9                   Medication Review:   atorvastatin, 40 mg, Oral, Daily  dextrose, 25 g, Intravenous, Once  digoxin, 125 mcg, Oral, Daily  ferrous sulfate, 325 mg, Oral, Daily With Breakfast  [START ON 11/16/2021] furosemide, 40 mg, Oral, Daily  guaiFENesin, 600 mg, Oral, Q12H  insulin lispro, 0-9 Units, Subcutaneous, 4x Daily With Meals & Nightly  magnesium oxide, 400 mg, Oral, BID  metoprolol succinate XL, 25 mg, Oral, Daily  pantoprazole, 40 mg, Oral, Q12H  sodium bicarbonate, 650 mg, Oral, BID  warfarin, 10 mg, Oral, Once         heparin (porcine), 12 Units/kg/hr, Last Rate: 11 Units/kg/hr (11/15/21 1011)  hold, 1 each  Pharmacy to dose warfarin,   sodium chloride, 30 mL/hr, Last Rate: Stopped (11/11/21 1928)        Assessment/Plan   1. Acute lower GIB - colonoscopy with AVM , cauterized and clip placed  3. Mechanical aortic valve - now on heparin. Watch for bleeding.  4. Chronic anticoagulation  5. Chronic kidney disease - nephrology following. On oral lasix.  6. Pleural effusion s/p right thoracentesis (1650 removed 10/25).  repeat thoracentesis 11/11/21 1600 removed  7. History of stroke with therapeutic INR (2.2) at the time. Target increased to 3-3.5.  8. Rectal sheath hematoma, no surgical intervention.   9. Pulmonary hypertension    tolerating heparin drip so far. Watch for bleeding.   Warfarin restarted 11/13. INr 1.27. could do lovenox at dc if he is ready to go and INR not >2.  Creatinine up a little today. Diuretics adjusted by nephrology.    LIZA Krishnamurthy  [facility]  11/15/21  14:37 EST

## 2021-11-15 NOTE — CASE MANAGEMENT/SOCIAL WORK
Continued Stay Note  [facility]     Patient Name: Francisco Sequeira  MRN: [id number]  Today's Date: 11/15/2021    Admit Date: 11/2/2021     Discharge Plan     Row Name 11/15/21 1323       Plan    Plan Home with spouse and continue services with [employer]  with plan to D/C once INR therapeutic. Family to transport    Patient/Family in Agreement with Plan yes    Plan Comments Ambulated 70 ft with PT. Remains on heparin drip. INR 1.27 today. HGB 9. Per Cardiology, plan to D/C once INR theraeutic. Aquilino Proctor RN-BC               Discharge Codes    No documentation.               Expected Discharge Date and Time     Expected Discharge Date Expected Discharge Time    Nov 17, 2021             Aquilino Proctor RN

## 2021-11-15 NOTE — PLAN OF CARE
Goal Outcome Evaluation:           Progress: no change  Outcome Summary: VSS, afebrile. No complaints continues with heparin gtt, decreased approx 0320 due to PTT >100. New rate started. Will continue to monitor.

## 2021-11-16 ENCOUNTER — APPOINTMENT (OUTPATIENT)
Dept: GENERAL RADIOLOGY | Facility: HOSPITAL | Age: 86
End: 2021-11-16

## 2021-11-16 ENCOUNTER — READMISSION MANAGEMENT (OUTPATIENT)
Dept: CALL CENTER | Facility: HOSPITAL | Age: 86
End: 2021-11-16

## 2021-11-16 ENCOUNTER — TRANSCRIBE ORDERS (OUTPATIENT)
Dept: HOME HEALTH SERVICES | Facility: HOME HEALTHCARE | Age: 86
End: 2021-11-16

## 2021-11-16 VITALS
BODY MASS INDEX: 21.21 KG/M2 | TEMPERATURE: 97.7 F | WEIGHT: 156.6 LBS | DIASTOLIC BLOOD PRESSURE: 58 MMHG | RESPIRATION RATE: 16 BRPM | SYSTOLIC BLOOD PRESSURE: 108 MMHG | HEIGHT: 72 IN | OXYGEN SATURATION: 96 % | HEART RATE: 51 BPM

## 2021-11-16 DIAGNOSIS — K92.2 GASTROINTESTINAL HEMORRHAGE, UNSPECIFIED GASTROINTESTINAL HEMORRHAGE TYPE: ICD-10-CM

## 2021-11-16 DIAGNOSIS — J90 PLEURAL EFFUSION: Primary | ICD-10-CM

## 2021-11-16 LAB
ALBUMIN SERPL-MCNC: 2.7 G/DL (ref 3.5–5.2)
ANION GAP SERPL CALCULATED.3IONS-SCNC: 9.7 MMOL/L (ref 5–15)
APTT PPP: 63.3 SECONDS (ref 22.7–35.4)
APTT PPP: 94.2 SECONDS (ref 22.7–35.4)
BACTERIA FLD CULT: NORMAL
BASOPHILS # BLD AUTO: 0.05 10*3/MM3 (ref 0–0.2)
BASOPHILS NFR BLD AUTO: 1 % (ref 0–1.5)
BUN SERPL-MCNC: 27 MG/DL (ref 8–23)
BUN/CREAT SERPL: 19.3 (ref 7–25)
CALCIUM SPEC-SCNC: 8.6 MG/DL (ref 8.6–10.5)
CHLORIDE SERPL-SCNC: 103 MMOL/L (ref 98–107)
CO2 SERPL-SCNC: 26.3 MMOL/L (ref 22–29)
CREAT SERPL-MCNC: 1.4 MG/DL (ref 0.76–1.27)
DEPRECATED RDW RBC AUTO: 57.3 FL (ref 37–54)
EOSINOPHIL # BLD AUTO: 0.25 10*3/MM3 (ref 0–0.4)
EOSINOPHIL NFR BLD AUTO: 5.2 % (ref 0.3–6.2)
ERYTHROCYTE [DISTWIDTH] IN BLOOD BY AUTOMATED COUNT: 20 % (ref 12.3–15.4)
GFR SERPL CREATININE-BSD FRML MDRD: 48 ML/MIN/1.73
GLUCOSE BLDC GLUCOMTR-MCNC: 145 MG/DL (ref 70–130)
GLUCOSE BLDC GLUCOMTR-MCNC: 223 MG/DL (ref 70–130)
GLUCOSE SERPL-MCNC: 138 MG/DL (ref 65–99)
GRAM STN SPEC: NORMAL
GRAM STN SPEC: NORMAL
HCT VFR BLD AUTO: 30.9 % (ref 37.5–51)
HGB BLD-MCNC: 9.8 G/DL (ref 13–17.7)
IMM GRANULOCYTES # BLD AUTO: 0.02 10*3/MM3 (ref 0–0.05)
IMM GRANULOCYTES NFR BLD AUTO: 0.4 % (ref 0–0.5)
INR PPP: 1.37 (ref 0.9–1.1)
LYMPHOCYTES # BLD AUTO: 0.95 10*3/MM3 (ref 0.7–3.1)
LYMPHOCYTES NFR BLD AUTO: 19.8 % (ref 19.6–45.3)
MAGNESIUM SERPL-MCNC: 2 MG/DL (ref 1.6–2.4)
MCH RBC QN AUTO: 25.5 PG (ref 26.6–33)
MCHC RBC AUTO-ENTMCNC: 31.7 G/DL (ref 31.5–35.7)
MCV RBC AUTO: 80.3 FL (ref 79–97)
MONOCYTES # BLD AUTO: 0.39 10*3/MM3 (ref 0.1–0.9)
MONOCYTES NFR BLD AUTO: 8.1 % (ref 5–12)
NEUTROPHILS NFR BLD AUTO: 3.13 10*3/MM3 (ref 1.7–7)
NEUTROPHILS NFR BLD AUTO: 65.5 % (ref 42.7–76)
NRBC BLD AUTO-RTO: 0 /100 WBC (ref 0–0.2)
PHOSPHATE SERPL-MCNC: 3 MG/DL (ref 2.5–4.5)
PLATELET # BLD AUTO: 235 10*3/MM3 (ref 140–450)
PMV BLD AUTO: 12.1 FL (ref 6–12)
POTASSIUM SERPL-SCNC: 3.6 MMOL/L (ref 3.5–5.2)
PROTHROMBIN TIME: 16.6 SECONDS (ref 11.7–14.2)
RBC # BLD AUTO: 3.85 10*6/MM3 (ref 4.14–5.8)
SODIUM SERPL-SCNC: 139 MMOL/L (ref 136–145)
WBC # BLD AUTO: 4.79 10*3/MM3 (ref 3.4–10.8)

## 2021-11-16 PROCEDURE — 97110 THERAPEUTIC EXERCISES: CPT

## 2021-11-16 PROCEDURE — 83735 ASSAY OF MAGNESIUM: CPT | Performed by: STUDENT IN AN ORGANIZED HEALTH CARE EDUCATION/TRAINING PROGRAM

## 2021-11-16 PROCEDURE — 99232 SBSQ HOSP IP/OBS MODERATE 35: CPT | Performed by: NURSE PRACTITIONER

## 2021-11-16 PROCEDURE — 85730 THROMBOPLASTIN TIME PARTIAL: CPT | Performed by: NURSE PRACTITIONER

## 2021-11-16 PROCEDURE — 85610 PROTHROMBIN TIME: CPT | Performed by: INTERNAL MEDICINE

## 2021-11-16 PROCEDURE — 85730 THROMBOPLASTIN TIME PARTIAL: CPT | Performed by: STUDENT IN AN ORGANIZED HEALTH CARE EDUCATION/TRAINING PROGRAM

## 2021-11-16 PROCEDURE — 63710000001 INSULIN LISPRO (HUMAN) PER 5 UNITS: Performed by: INTERNAL MEDICINE

## 2021-11-16 PROCEDURE — 85025 COMPLETE CBC W/AUTO DIFF WBC: CPT | Performed by: NURSE PRACTITIONER

## 2021-11-16 PROCEDURE — 25010000002 HEPARIN (PORCINE) PER 1000 UNITS: Performed by: NURSE PRACTITIONER

## 2021-11-16 PROCEDURE — 82962 GLUCOSE BLOOD TEST: CPT

## 2021-11-16 PROCEDURE — 80069 RENAL FUNCTION PANEL: CPT | Performed by: INTERNAL MEDICINE

## 2021-11-16 PROCEDURE — 71046 X-RAY EXAM CHEST 2 VIEWS: CPT

## 2021-11-16 PROCEDURE — 36415 COLL VENOUS BLD VENIPUNCTURE: CPT | Performed by: NURSE PRACTITIONER

## 2021-11-16 RX ORDER — FUROSEMIDE 20 MG/1
20 TABLET ORAL DAILY
Status: DISCONTINUED | OUTPATIENT
Start: 2021-11-16 | End: 2021-11-16 | Stop reason: HOSPADM

## 2021-11-16 RX ORDER — DIPHENHYDRAMINE HCL 25 MG
25 CAPSULE ORAL 2 TIMES DAILY PRN
Start: 2021-11-16

## 2021-11-16 RX ORDER — WARFARIN SODIUM 10 MG/1
10 TABLET ORAL
Status: COMPLETED | OUTPATIENT
Start: 2021-11-16 | End: 2021-11-16

## 2021-11-16 RX ORDER — FUROSEMIDE 20 MG/1
TABLET ORAL
Qty: 90 TABLET | Refills: 0 | Status: SHIPPED | OUTPATIENT
Start: 2021-11-16 | End: 2021-11-29 | Stop reason: SDUPTHER

## 2021-11-16 RX ADMIN — MAGNESIUM OXIDE 400 MG (241.3 MG MAGNESIUM) TABLET 400 MG: TABLET at 09:19

## 2021-11-16 RX ADMIN — DIGOXIN 125 MCG: 250 TABLET ORAL at 09:19

## 2021-11-16 RX ADMIN — HEPARIN SODIUM 9 UNITS/KG/HR: 10000 INJECTION, SOLUTION INTRAVENOUS at 10:23

## 2021-11-16 RX ADMIN — PANTOPRAZOLE SODIUM 40 MG: 40 TABLET, DELAYED RELEASE ORAL at 09:19

## 2021-11-16 RX ADMIN — FERROUS SULFATE TAB 325 MG (65 MG ELEMENTAL FE) 325 MG: 325 (65 FE) TAB at 09:19

## 2021-11-16 RX ADMIN — METOPROLOL SUCCINATE 25 MG: 25 TABLET, EXTENDED RELEASE ORAL at 09:19

## 2021-11-16 RX ADMIN — GUAIFENESIN 600 MG: 600 TABLET, EXTENDED RELEASE ORAL at 09:19

## 2021-11-16 RX ADMIN — WARFARIN 10 MG: 10 TABLET ORAL at 15:40

## 2021-11-16 RX ADMIN — FUROSEMIDE 40 MG: 40 TABLET ORAL at 09:19

## 2021-11-16 RX ADMIN — ATORVASTATIN CALCIUM 40 MG: 20 TABLET, FILM COATED ORAL at 09:19

## 2021-11-16 RX ADMIN — SODIUM BICARBONATE 650 MG: 650 TABLET ORAL at 09:19

## 2021-11-16 RX ADMIN — INSULIN LISPRO 4 UNITS: 100 INJECTION, SOLUTION INTRAVENOUS; SUBCUTANEOUS at 12:46

## 2021-11-16 NOTE — PLAN OF CARE
Goal Outcome Evaluation:  Plan of Care Reviewed With: patient           Outcome Summary: Pt. able to ambulate 200 feet, CGA x 1, with use of Rwx this date. Pt. requires CGA x 1 for bed mobility and CGA x 1 for sit <-> stand transfers.  BLE ther. ex. program x 10 reps completed for general strengthening. Verbal/tactile cues given during ambulation for posture correction.    Patient was wearing a face mask during this therapy encounter. Therapist used appropriate personal protective equipment including eye protection, mask, and gloves.  Mask used was standard procedure mask. Appropriate PPE was worn during the entire therapy session. Hand hygiene was completed before and after therapy session. Patient is not in enhanced droplet precautions.

## 2021-11-16 NOTE — THERAPY TREATMENT NOTE
Patient Name: Francisco Sequeira  : 1935    MRN: [id number]                              Today's Date: 2021       Admit Date: 2021    Visit Dx:     ICD-10-CM ICD-9-CM   1. Lower GI bleed  K92.2 578.9   2. Hypoglycemia  E16.2 251.2   3. On warfarin therapy  Z79.01 V58.61   4. H/O insulin dependent diabetes mellitus  Z86.39 V12.29   5. 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)   • Iron deficiency anemia   • Chronic anticoagulation   • Lower GI bleed   • History of nephrectomy   • Abnormal urinalysis   • Recurrent right pleural effusion   • Hemiparesis of left nondominant side as late effect of cerebral infarction (HCC)   • Rectus sheath hematoma   • Acute posthemorrhagic anemia   • Angiodysplasia of colon without hemorrhage     Past Medical History:   Diagnosis Date   • Allergic rhinitis    • Aortic valve insufficiency    • Ascending aortic aneurysm (HCC)    • Atrial fibrillation (HCC)    • Bacteremia    • Calcific aortic stenosis of bicuspid valve    • Cardiac arrest (HCC)    • Cardiomyopathy (HCC)    • CKD (chronic kidney disease) stage 3, GFR 30-59 ml/min (HCC)    • Contact dermatitis due to poison ivy    • Elbow fracture    • Esophageal reflux    • GERD (gastroesophageal reflux disease)    • Head injury    • History of transfusion    • Hyperglycemia    • Hyperlipidemia    • Hypertension    • Kidney carcinoma (HCC)    • Nonischemic  cardiomyopathy (HCC)    • Renal oncocytoma    • Seasonal allergic reaction    • Sinusitis    • Syncope    • Type 2 diabetes mellitus (HCC)     uncontrolled   • Visual field defect      Past Surgical History:   Procedure Laterality Date   • AORTIC VALVE REPAIR/REPLACEMENT     • ASCENDING AORTIC ANEURYSM REPAIR W/ MECHANICAL AORTIC VALVE REPLACEMENT     • COLONOSCOPY N/A 10/28/2021    Procedure: COLONOSCOPY to cecum:  cold snare polyps,;  Surgeon: Dinesh Meza MD;  Location: [facility] ENDOSCOPY;  Service: Gastroenterology;  Laterality: N/A;  pre:  Iron deficiency anemia  post:  polyps, diverticulosis,    • COLONOSCOPY N/A 11/9/2021    Procedure: COLONOSCOPY to cecum with APC cautery of AVM and clip placement x1;  Surgeon: Pavan Rodriguez MD;  Location:  [facility];  Service: Gastroenterology;  Laterality: N/A;  PRE - gi bleed, anemia  POST - diverticulosis, poor prep, AVM right colon   • ENDOSCOPY N/A 10/28/2021    Procedure: ESOPHAGOGASTRODUODENOSCOPY with biopsies;  Surgeon: Dinesh Meza MD;  [facility];  Service: Gastroenterology;  Laterality: N/A;  pre:  Iron deficiency anemia  post:  duodenitis and gastritis   • EYE SURGERY  12/09/2020    cataract surgery    • NEPHRECTOMY     • OTHER SURGICAL HISTORY      elbow surgery   • OTHER SURGICAL HISTORY      right arm surgery   • PROSTATE SURGERY     • THORACENTESIS Left     diagnostic      General Information     Row Name 11/16/21 1547          Physical Therapy Time and Intention    Document Type therapy note (daily note)  -MS     Mode of Treatment physical therapy; individual therapy  -MS     Row Name 11/16/21 1546          General Information    Patient Profile Reviewed yes  -MS     Existing Precautions/Restrictions fall   Exit alarm  -MS     Barriers to Rehab none identified  -MS     Row Name 11/16/21 1694          Cognition    Orientation Status (Cognition) oriented x 3  -MS     Row Name 11/16/21 3120          Safety  Issues, Functional Mobility    Comment, Safety Issues/Impairments (Mobility) Gait belt used for safety.  -MS           User Key  (r) = Recorded By, (t) = Taken By, (c) = Cosigned By    Initials Name Provider Type    Vladimir Savage, PT Physical Therapist               Mobility     Row Name 11/16/21 1542          Bed Mobility    Bed Mobility supine-sit; sit-supine  -MS     Supine-Sit Maury (Bed Mobility) contact guard  -MS     Sit-Supine Maury (Bed Mobility) contact guard  -MS     Row Name 11/16/21 1542          Sit-Stand Transfer    Sit-Stand Maury (Transfers) contact guard  -MS     Assistive Device (Sit-Stand Transfers) walker, front-wheeled  -MS     Row Name 11/16/21 1542          Gait/Stairs (Locomotion)    Maury Level (Gait) contact guard  -MS     Assistive Device (Gait) walker, front-wheeled  -MS     Distance in Feet (Gait) 200 feet  -MS     Deviations/Abnormal Patterns (Gait) raul decreased  -MS     Bilateral Gait Deviations forward flexed posture  -MS     Comment (Gait/Stairs) Verbal/tactile cues given for posture correction.  -MS           User Key  (r) = Recorded By, (t) = Taken By, (c) = Cosigned By    Initials Name Provider Type    Vladimir Savage, PT Physical Therapist               Obj/Interventions     Row Name 11/16/21 1547          Motor Skills    Therapeutic Exercise --  BLE ther. ex. program x 10 reps completed (Ankle pumps, Hip Flexion, LAQ's)  -MS           User Key  (r) = Recorded By, (t) = Taken By, (c) = Cosigned By    Initials Name Provider Type    Vladimir Savage, PT Physical Therapist               Goals/Plan    No documentation.                Clinical Impression     Row Name 11/16/21 1540          Pain    Additional Documentation Pain Scale: Numbers Pre/Post-Treatment (Group)  -MS     Row Name 11/16/21 1540          Pain Scale: Numbers Pre/Post-Treatment    Pretreatment Pain Rating 0/10 - no pain  -MS     Posttreatment Pain Rating 0/10 - no  pain  -MS     Row Name 11/16/21 1543          Positioning and Restraints    Pre-Treatment Position in bed  -MS     Post Treatment Position bed  -MS     In Bed notified nsg; supine; call light within reach; encouraged to call for assist; exit alarm on; with family/caregiver  All lines intact.  -MS           User Key  (r) = Recorded By, (t) = Taken By, (c) = Cosigned By    Initials Name Provider Type    Vladimir Savage, PT Physical Therapist               Outcome Measures     Row Name 11/16/21 1544          How much help from another person do you currently need...    Turning from your back to your side while in flat bed without using bedrails? 3  -MS     Moving from lying on back to sitting on the side of a flat bed without bedrails? 3  -MS     Moving to and from a bed to a chair (including a wheelchair)? 3  -MS     Standing up from a chair using your arms (e.g., wheelchair, bedside chair)? 3  -MS     Climbing 3-5 steps with a railing? 3  -MS     To walk in hospital room? 3  -MS     AM-PAC 6 Clicks Score (PT) 18  -MS     Row Name 11/16/21 1544          Functional Assessment    Outcome Measure Options AM-PAC 6 Clicks Basic Mobility (PT)  -MS           User Key  (r) = Recorded By, (t) = Taken By, (c) = Cosigned By    Initials Name Provider Type    Vladimir Savage, PT Physical Therapist                             Physical Therapy Education                 Title: PT OT SLP Therapies (Done)     Topic: Physical Therapy (Done)     Point: Mobility training (Done)     Learning Progress Summary           Patient Acceptance, E,D, NR,VU by MS at 11/16/2021 1544      Show all documentation for this point (2)                 Point: Home exercise program (Done)     Learning Progress Summary           Patient Acceptance, E,D, NR,VU by MS at 11/16/2021 1544      Show all documentation for this point (2)                 Point: Body mechanics (Done)     Learning Progress Summary           Patient Acceptance, E,D, NR,VU by MS  at 11/16/2021 1544      Show all documentation for this point (2)                 Point: Precautions (Done)     Learning Progress Summary           Patient Acceptance, E,D, NR,VU by MS at 11/16/2021 1544      Show all documentation for this point (2)                             User Key     Initials Effective Dates Name Provider Type Discipline    MS 06/16/21 -  Vladimir Cabrales PT Physical Therapist PT              PT Recommendation and Plan     Plan of Care Reviewed With: patient  Outcome Summary: Pt. able to ambulate 200 feet, CGA x 1, with use of Rwx this date. Pt. requires CGA x 1 for bed mobility and CGA x 1 for sit <-> stand transfers.  BLE ther. ex. program x 10 reps completed for general strengthening. Verbal/tactile cues given during ambulation for posture correction.     Time Calculation:    PT Charges     Row Name 11/16/21 1545             Time Calculation    Start Time 1430  -MS      Stop Time 1445  -MS      Time Calculation (min) 15 min  -MS      PT Received On 11/16/21  -MS      PT - Next Appointment 11/17/21  -MS              Time Calculation- PT    Total Timed Code Minutes- PT 14 minute(s)  -MS            User Key  (r) = Recorded By, (t) = Taken By, (c) = Cosigned By    Initials Name Provider Type    MS Vladimir Cabrales PT Physical Therapist              Therapy Charges for Today     Code Description Service Date Service Provider Modifiers Qty    [id number] HC PT THER PROC EA 15 MIN 11/16/2021 Vladimir Cabrales PT GP 1          PT G-Codes  Outcome Measure Options: AM-PAC 6 Clicks Basic Mobility (PT)  AM-PAC 6 Clicks Score (PT): 18    Vladimir Cabrales PT  11/16/2021

## 2021-11-16 NOTE — CASE MANAGEMENT/SOCIAL WORK
Case Management Discharge Note      Final Note: Home with spouse and continue services with [facility]. Notified [facility] of D/C today.  D/C on Lovenox until INR therapuetic. Has home INR machine. Family to transport.         Selected Continued Care - Admitted Since 11/2/2021     Destination    No services have been selected for the patient.              Durable Medical Equipment    No services have been selected for the patient.              Dialysis/Infusion    No services have been selected for the patient.              Home Medical Care Coordination complete.    Service Provider Selected Services Address Phone Fax Patient Preferred    [facility]  Home Health Services [address] [address] [phone] --          Therapy    No services have been selected for the patient.              Community Resources    No services have been selected for the patient.              Community & DME    No services have been selected for the patient.                Selected Continued Care - Prior Encounters Includes selections from prior encounters from 8/4/2021 to 11/16/2021    Discharged on 10/30/2021 Admission date: 10/23/2021 - Discharge disposition: Home or Self Care    Durable Medical Equipment     Service Provider Selected Services Address Phone Fax Patient Preferred    [facility]  Durable Medical Equipment [address], [facility] [id number] [phone] --          Home Medical Care     Service Provider Selected Services Address Phone Fax Patient Preferred    [facility]  Home Health Services [address] [address] [phone] --                    Transportation Services  Private: Car

## 2021-11-16 NOTE — PROGRESS NOTES
[facility] Progress Note      Patient Name: Francisco Sequeira  : 1935  MRN: [id number]  Primary Care Physician:  Rubin Hinkle APRN  Date of admission: 2021    Subjective     Interval History:   He continues to feel better.  Denies any nausea no vomiting.  No fever no chills.  Denies any shortness of breath.  Appetite has been improving.    Review of Systems:   As noted above    Objective     Vitals:   Temp:  [97.7 °F (36.5 °C)-98.3 °F (36.8 °C)] 98.3 °F (36.8 °C)  Heart Rate:  [66-80] 80  Resp:  [16-20] 16  BP: (118-130)/(59-77) 118/77    Intake/Output Summary (Last 24 hours) at 2021 1055  Last data filed at 2021 0722  Gross per 24 hour   Intake 480 ml   Output 500 ml   Net -20 ml       Physical Exam:    General Appearance: alert, oriented x 3, no acute distress   Skin: warm and dry  HEENT: oral mucosa normal, nonicteric sclera  Neck: supple, no JVD  Lungs: CTA  Heart: RRR, normal S1 and S2  Abdomen: soft, nontender, nondistended  : no palpable bladder  Extremities: no edema, cyanosis or clubbing  Neuro: normal speech and mental status     Scheduled Meds:     atorvastatin, 40 mg, Oral, Daily  dextrose, 25 g, Intravenous, Once  digoxin, 125 mcg, Oral, Daily  ferrous sulfate, 325 mg, Oral, Daily With Breakfast  furosemide, 40 mg, Oral, Daily  guaiFENesin, 600 mg, Oral, Q12H  insulin lispro, 0-9 Units, Subcutaneous, 4x Daily With Meals & Nightly  magnesium oxide, 400 mg, Oral, BID  metoprolol succinate XL, 25 mg, Oral, Daily  pantoprazole, 40 mg, Oral, Q12H  sodium bicarbonate, 650 mg, Oral, BID  warfarin, 10 mg, Oral, Once      IV Meds:   heparin (porcine), 12 Units/kg/hr, Last Rate: 9 Units/kg/hr (21 1023)  hold, 1 each  Pharmacy to dose warfarin,   sodium chloride, 30 mL/hr, Last Rate: Stopped (21)        Results Reviewed:   I have personally reviewed the results from the time of this admission to 2021 10:55 EST     Results from last 7  days   Lab Units 11/16/21  0511 11/15/21  0055 11/14/21  0613   SODIUM mmol/L 139 142 138   POTASSIUM mmol/L 3.6 3.7 3.7   CHLORIDE mmol/L 103 104 102   CO2 mmol/L 26.3 28.2 24.9   BUN mg/dL 27* 27* 22   CREATININE mg/dL 1.40* 1.48* 1.21   CALCIUM mg/dL 8.6 8.1* 8.3*   GLUCOSE mg/dL 138* 111* 156*       Estimated Creatinine Clearance: 40.1 mL/min (A) (by C-G formula based on SCr of 1.4 mg/dL (H)).    Results from last 7 days   Lab Units 11/16/21  0511 11/15/21  0055 11/14/21  0613   MAGNESIUM mg/dL 2.0 1.9 1.9   PHOSPHORUS mg/dL 3.0 3.3 3.1             Results from last 7 days   Lab Units 11/16/21  0511 11/15/21  0055 11/14/21  0613 11/13/21  0624 11/12/21  0354   WBC 10*3/mm3 4.79 5.33 5.42 5.28 5.94   HEMOGLOBIN g/dL 9.8* 9.0* 10.1* 9.3* 8.7*   PLATELETS 10*3/mm3 235 214 258 224 233       Results from last 7 days   Lab Units 11/16/21  0511 11/15/21  0055 11/14/21  0613 11/13/21  0624 11/12/21  0354   INR  1.37* 1.27* 1.21* 1.27* 1.46*       Assessment / Plan     ASSESSMENT:  BYRON on CKD3, non-oliguric, BYRON resolved (ATN from hypovolemia, blood loss, hypotension, contrast), peak Cr 2.0, down to 1.2  And then up  to 1.5 after increased dose of Lasix.  Now creatinine is down to 1.4.  Will allow permissive azotemia in order to keep him euvolemic.  We will increase his diuretic gradually.  Acute GI bleed with bloody stools noted this admission and tagged RBC scan showing bleed in the RLQ consistent with distal ileum or proximal colon.  Colonoscopy performed with AVM that was cauterized on IV heparin  Vol overload - improved, on diuretics  History of mechanical AVR and Atrial Fibrillation anticoagulated with warfarin. His warfarin is being held and IV heparin drip infusing  Hypertension, controlled  Recurrent right pleural effusion; transudative. Repeat thoracentesis performed on 11/11, 1.6L removed 1.6 L  Type II diabetes. Hgb A1c 5.2 last admission.  History of CVA with left side hemiparesis.  9.         Metabolic  acidosis - overcorrected,      Plan  -We will allow permissive azotemia in order to keep him euvolemic.  We will increase Lasix to 40 mg in the morning and 20 mg in the evening.  Okay to discharge from nephrology standpoint to follow-up in the nephrology clinic in 1 week with Dr. Dominguez.  We will arrange for that follow-up.  -Hold sodium bicarbonate      Thank you for involving us in the care of Francisco Sequeira.  Please feel free to call with any questions.    Laz Fernandes MD  11/16/21  10:55 [facility]    [facility]  [phone]      Much of this encounter note is an electronic transcription/translation of spoken language to printed text. The electronic translation of spoken language may permit erroneous, or at times, nonsensical words or phrases to be inadvertently transcribed; Although I have reviewed the note for such errors, some may still exist.

## 2021-11-16 NOTE — PLAN OF CARE
Goal Outcome Evaluation:      No c/o pain or soa. Patient discharging home with spouse and home health. Will continue Lovenox injections until INR therapeutic.

## 2021-11-16 NOTE — DISCHARGE INSTRUCTIONS
Measure INR every morning daily, and send it to anticoagulation clinic daily.  Stop taking Lovenox once INR is 2.5.  Follow-up with PCP in 3-5  days.  Follow-up with cardiology as scheduled.        Notify your primary care provider if you experience chest pain, difficulty breathing, fevers/chills, nausea or vomiting, bleeding in stool, excessive diarrhea, numbness or weakness or tingling in any part of your body.

## 2021-11-16 NOTE — PROGRESS NOTES
"      [facility]         Dr Castillo Sayied   LOS: 14 days   Patient Care Team:  Rubin Hinkle APRN as PCP - General (Family Medicine)  Audra Vazquez RPH as Pharmacist  Vasquez Niño PharmD as Pharmacist (Pharmacy)    Chief Complaint: Bilateral pleural effusion right rater than left with acute GI bleed lower on anticoagulation for mechanical aortic valve    Interval History: Resting comfortably no overnight issues reported.    REVIEW OF SYSTEMS:   CARDIOVASCULAR: No chest pain, chest pressure or chest discomfort. No palpitations or edema.   RESPIRATORY: Short of breath with activity  GASTROINTESTINAL: No anorexia, nausea, vomiting or diarrhea. No abdominal pain or blood.   HEMATOLOGIC: No bleeding or bruising.     Ventilator/Non-Invasive Ventilation Settings (From admission, onward)            None            Vital Signs  Temp:  [97.7 °F (36.5 °C)-98.3 °F (36.8 °C)] 98.3 °F (36.8 °C)  Heart Rate:  [66-80] 80  Resp:  [16-20] 16  BP: (118-130)/(59-77) 118/77    Intake/Output Summary (Last 24 hours) at 11/16/2021 1310  Last data filed at 11/16/2021 1120  Gross per 24 hour   Intake 480 ml   Output 500 ml   Net -20 ml     Flowsheet Rows      First Filed Value   Admission Height 182.9 cm (72\") Documented at 11/02/2021 0344   Admission Weight 81.6 kg (180 lb) Documented at 11/02/2021 0344          Physical Exam:  Patient is examined using the personal protective equipment as per guidelines from infection control for this particular patient as enacted.  Hand hygiene was performed before and after patient interaction.   General Appearance:    Alert, cooperative, in no acute distress.  Following simple commands  Neck midline trachea, no thyromegaly   Lungs:    Diminished breath sounds bilaterally right rater than left no labored breathing    Heart:    Regular rhythm and normal rate, normal S1 and S2, no            murmur, no gallop, no rub, no click   Chest Wall:    No abnormalities observed   Abdomen:     " Normal bowel sounds, no masses, no organomegaly, soft        nontender, nondistended, no guarding, no rebound                tenderness   Extremities:   Moves all extremities well, no edema, no cyanosis, no             redness  CNS no focal neurological deficits normal sensory exam  Skin no rashes no nodules  Musculoskeletal no cyanosis no clubbing normal range of motion     Results Review:        Results from last 7 days   Lab Units 11/16/21  0511 11/15/21  0055 11/15/21  0055 11/14/21  0613 11/14/21  0613   SODIUM mmol/L 139  --  142  --  138   POTASSIUM mmol/L 3.6  --  3.7  --  3.7   CHLORIDE mmol/L 103  --  104  --  102   CO2 mmol/L 26.3  --  28.2  --  24.9   BUN mg/dL 27*  --  27*  --  22   CREATININE mg/dL 1.40*  --  1.48*  --  1.21   GLUCOSE mg/dL 138*   < > 111*   < > 156*   CALCIUM mg/dL 8.6  --  8.1*  --  8.3*    < > = values in this interval not displayed.         Results from last 7 days   Lab Units 11/16/21  0511 11/15/21  0055 11/14/21  0613   WBC 10*3/mm3 4.79 5.33 5.42   HEMOGLOBIN g/dL 9.8* 9.0* 10.1*   HEMATOCRIT % 30.9* 28.7* 32.3*   PLATELETS 10*3/mm3 235 214 258     Results from last 7 days   Lab Units 11/16/21  0511 11/15/21  0925 11/15/21  0055 11/14/21  1529 11/14/21  0613   INR  1.37*  --  1.27*  --  1.21*   APTT seconds 94.2* 68.9* 108.1*   < > 56.7*    < > = values in this interval not displayed.         Results from last 7 days   Lab Units 11/16/21 0511   MAGNESIUM mg/dL 2.0               I reviewed the patient's new clinical results.  I personally viewed and interpreted the patient's chest x-ray.        Medication Review:   atorvastatin, 40 mg, Oral, Daily  dextrose, 25 g, Intravenous, Once  digoxin, 125 mcg, Oral, Daily  ferrous sulfate, 325 mg, Oral, Daily With Breakfast  furosemide, 20 mg, Oral, Daily  furosemide, 40 mg, Oral, Daily  guaiFENesin, 600 mg, Oral, Q12H  insulin lispro, 0-9 Units, Subcutaneous, 4x Daily With Meals & Nightly  magnesium oxide, 400 mg, Oral, BID  metoprolol  succinate XL, 25 mg, Oral, Daily  pantoprazole, 40 mg, Oral, Q12H  sodium bicarbonate, 650 mg, Oral, BID  warfarin, 10 mg, Oral, Once        heparin (porcine), 12 Units/kg/hr, Last Rate: 9 Units/kg/hr (11/16/21 1023)  hold, 1 each  Pharmacy to dose warfarin,   sodium chloride, 30 mL/hr, Last Rate: Stopped (11/11/21 1928)        ASSESSMENT:   Rt> Lt pl effusion s/p thora 11/11 - 1.6L; transudate  Acute GI bleed; lower  Previous mechanical aortic valve replacement  Pulmonary hypertension  A. fib  Anemia    PLAN:  Status post thoracentesis.  Fluid appears to be transudative likely from CHF.  Repeat chest x-ray this morning reviewed discussed with wife.  Stable right pleural effusion to me it looks improving slowly.  Will recommend follow-up chest x-ray in 4 to 6 weeks or sooner if patient is symptomatic  Continue adjustment of diuresis per nephrology  Anticoagulation has been restarted currently tolerating well.  Currently on room air tolerating well  GI bleeding currently reason for patient to stay in the hospital  Monitor pleural effusion closely  Discussed with wife at bedside  No objections to discharge from pulmonary point of view      Jonathan Blackmon MD  11/16/21  13:10 EST

## 2021-11-16 NOTE — PLAN OF CARE
Goal Outcome Evaluation:    Progress: no change  Outcome Summary: Vitals stable. No c/o pain. Heparin gtt therapeutic. Chest XR this am. Possible discharge today if creatinine improved. Will continue to monitor.

## 2021-11-16 NOTE — OUTREACH NOTE
Prep Survey      Responses   [facility] facility patient discharged from? [place]   Is LACE score < 7 ? No   Emergency Room discharge w/ pulse ox? No   Eligibility [facility]   Date of Admission 11/02/21   Date of Discharge 11/16/21   Discharge Disposition Home or Self Care   Discharge diagnosis Lower GI bleed Angiodysplasia of colon without hemorrhage Hemiparesis of left nondominant side    Does the patient have one of the following disease processes/diagnoses(primary or secondary)? Other   Does the patient have Home health ordered? Yes   What is the Home health agency?  [facility]    Is there a DME ordered? No   Prep survey completed? Yes          Thuy Dhaliwal RN

## 2021-11-16 NOTE — PROGRESS NOTES
"    Patient Name: Francisco Sequeira  :1935  85 y.o.      Patient Care Team:  Rubin Hinkle APRN as PCP - General (Family Medicine)  Audra Vazquez RPH as Pharmacist  Vasquez Niño PharmD as Pharmacist (Pharmacy)    Chief Complaint: follow up GIB, mechanical aortic valve    Interval History: he is doing well. He is ambulating more and feeling stronger. Hgb is stable. He is on heparin and warfarin. No s/s of bleeding in stool.     Objective   Vital Signs  Temp:  [97.7 °F (36.5 °C)-98.3 °F (36.8 °C)] 98.3 °F (36.8 °C)  Heart Rate:  [66-80] 80  Resp:  [16-20] 16  BP: (118-130)/(59-77) 118/77    Intake/Output Summary (Last 24 hours) at 2021 1317  Last data filed at 2021 1120  Gross per 24 hour   Intake 480 ml   Output 500 ml   Net -20 ml     Flowsheet Rows      First Filed Value   Admission Height 182.9 cm (72\") Documented at 2021 0344   Admission Weight 81.6 kg (180 lb) Documented at 2021 0344          Physical Exam:   General Appearance:    Alert, cooperative, in no acute distress   Lungs:     Clear to auscultation.  Normal respiratory effort and rate.      Heart:    Regular rhythm and normal rate, normal S1 and S2, no murmurs, gallops or rubs.     Chest Wall:    No abnormalities observed   Abdomen:     Soft, nontender, positive bowel sounds.     Extremities:   no cyanosis, clubbing or edema.  No marked joint deformities.  Adequate musculoskeletal strength.       Results Review:    Results from last 7 days   Lab Units 21  0511   SODIUM mmol/L 139   POTASSIUM mmol/L 3.6   CHLORIDE mmol/L 103   CO2 mmol/L 26.3   BUN mg/dL 27*   CREATININE mg/dL 1.40*   GLUCOSE mg/dL 138*   CALCIUM mg/dL 8.6         Results from last 7 days   Lab Units 21  0511   WBC 10*3/mm3 4.79   HEMOGLOBIN g/dL 9.8*   HEMATOCRIT % 30.9*   PLATELETS 10*3/mm3 235     Results from last 7 days   Lab Units 21  0511 11/15/21  0925 11/15/21  0055 21  1529 21  0613   INR  1.37*  --  1.27*  --  " 1.21*   APTT seconds 94.2* 68.9* 108.1*   < > 56.7*    < > = values in this interval not displayed.     Results from last 7 days   Lab Units 11/16/21  0511   MAGNESIUM mg/dL 2.0                   Medication Review:   atorvastatin, 40 mg, Oral, Daily  dextrose, 25 g, Intravenous, Once  digoxin, 125 mcg, Oral, Daily  ferrous sulfate, 325 mg, Oral, Daily With Breakfast  furosemide, 20 mg, Oral, Daily  furosemide, 40 mg, Oral, Daily  guaiFENesin, 600 mg, Oral, Q12H  insulin lispro, 0-9 Units, Subcutaneous, 4x Daily With Meals & Nightly  magnesium oxide, 400 mg, Oral, BID  metoprolol succinate XL, 25 mg, Oral, Daily  pantoprazole, 40 mg, Oral, Q12H  sodium bicarbonate, 650 mg, Oral, BID  warfarin, 10 mg, Oral, Once         heparin (porcine), 12 Units/kg/hr, Last Rate: 9 Units/kg/hr (11/16/21 1023)  hold, 1 each  Pharmacy to dose warfarin,   sodium chloride, 30 mL/hr, Last Rate: Stopped (11/11/21 1928)        Assessment/Plan   1. Acute lower GIB - colonoscopy with AVM , cauterized and clip placed  3. Mechanical aortic valve - now on heparin. Watch for bleeding.  4. Chronic anticoagulation  5. Chronic kidney disease - nephrology following. On oral lasix.  6. Pleural effusion s/p right thoracentesis (1650 removed 10/25).  repeat thoracentesis 11/11/21 1600 removed  7. History of stroke with therapeutic INR (2.2) at the time. Target increased to 3-3.5.  8. Rectal sheath hematoma, no surgical intervention.   9. Pulmonary hypertension    tolerating heparin drip. Hgb stable. No blood in stool noted.   Warfarin restarted 11/13. INr 1.48. Can do lovenox at dc.  Creatinine up a little. Permissive azotemia. Nephrology is managing diuretics.   CXR reviewed. pulm recommends follow up imaging in 6-8 weeks.   No objection to discharge. See LIZA Newell in 2-3 weeks.    LIZA Krishnamurthy  [facility]  11/16/21  13:17 EST

## 2021-11-16 NOTE — PROGRESS NOTES
Pharmacy Consult: Warfarin Dosing/ Monitoring    Francisco Sequeira is a 85 y.o. male, estimated creatinine clearance is 40.1 mL/min (A) (by C-G formula based on SCr of 1.4 mg/dL (H)). weighing 71 kg (156 lb 9.6 oz).     has a past medical history of Allergic rhinitis, Aortic valve insufficiency, Ascending aortic aneurysm (HCC), Atrial fibrillation (HCC), Bacteremia, Calcific aortic stenosis of bicuspid valve, Cardiac arrest (HCC), Cardiomyopathy (HCC), CKD (chronic kidney disease) stage 3, GFR 30-59 ml/min (HCC), Contact dermatitis due to poison ivy, Elbow fracture, Esophageal reflux, GERD (gastroesophageal reflux disease), Head injury, History of transfusion, Hyperglycemia, Hyperlipidemia, Hypertension, Kidney carcinoma (HCC), Nonischemic cardiomyopathy (HCC), Renal oncocytoma, Seasonal allergic reaction, Sinusitis, Syncope, Type 2 diabetes mellitus (HCC), and Visual field defect.    Social History     Tobacco Use    Smoking status: Former Smoker    Smokeless tobacco: Former User    Tobacco comment: caffeine use   Vaping Use    Vaping Use: Never used   Substance Use Topics    Alcohol use: Yes     Comment: occasional    Drug use: No       Results from last 7 days   Lab Units 11/16/21  0511 11/15/21  0925 11/15/21  0055 11/14/21  1529 11/14/21  0613 11/13/21  0624 11/12/21  0354 11/11/21  1143 11/11/21  0512 11/10/21  0618   INR  1.37*  --  1.27*  --  1.21* 1.27* 1.46*  --  1.57* 1.73*   APTT seconds 94.2* 68.9* 108.1* 53.5* 56.7* 74.5* 81.1*   < >  --   --    HEMOGLOBIN g/dL 9.8*  --  9.0*  --  10.1* 9.3* 8.7*  --  8.6* 9.1*   HEMATOCRIT % 30.9*  --  28.7*  --  32.3* 29.6* 29.2*  --  26.5* 30.0*   PLATELETS 10*3/mm3 235  --  214  --  258 224 233  --  234 244    < > = values in this interval not displayed.     Results from last 7 days   Lab Units 11/16/21  0511 11/15/21  0055 11/14/21  0613   SODIUM mmol/L 139 142 138   POTASSIUM mmol/L 3.6 3.7 3.7   CHLORIDE mmol/L 103 104 102   CO2 mmol/L 26.3 28.2 24.9   BUN mg/dL  27* 27* 22   CREATININE mg/dL 1.40* 1.48* 1.21   CALCIUM mg/dL 8.6 8.1* 8.3*   GLUCOSE mg/dL 138* 111* 156*     Anticoagulation history: MWF 7.5 mg, SuTThSa 5 mg outpatient.     Hospital Anticoagulation:  Consulting provider: Dr. Coughlin  Start date: restarted 11/13.  Indication: mechanical ht valve  Target INR: 2.5-3.5 (3.0-3.5 according to anticoag clinic)  Expected duration: lifetime   Bridge Therapy: yes            and unfractionated heparin IV infusion  Date 11/13 11/14 11/15 11/16         INR 1.27 1.21 1.27 1.37         Warfarin dose 7.5 7.5 mg 10mg 10 mg           Potential drug interactions:   Acetaminophen-moderate-may increase bleeding risk  Pantoprazole-may increase INR  Boost supplements can lower INR with amount of vitamin K in product    Relevant nutrition status: mechanical soft w/ boost supplements    Other:     Education complete?/ Date: no/PTA medication followed by anticoag clinic    Assessment/Plan:  Dose 10mg today. May need 7.5mg daily while on boost starting 11/17  Monitor pt/inr, s/sx bleeding  Follow up 11/17    Pharmacy will continue to follow until discharge or discontinuation of warfarin.   Christina Johnson, CHARI  11/16/2021

## 2021-11-16 NOTE — DISCHARGE SUMMARY
Patient Name: Francisco Sequeira  : 1935  MRN: [id number]    Date of Admission: 2021  Date of Discharge:  2021  Primary Care Physician: Rubin Hinkle APRN      Chief Complaint:   Black or Bloody Stool      Discharge Diagnoses     Active Hospital Problems    Diagnosis  POA   • **Lower GI bleed [K92.2]  Yes   • Angiodysplasia of colon without hemorrhage [K55.20]  Yes   • Rectus sheath hematoma [S30.1XXA]  No   • Acute posthemorrhagic anemia [D62]  Yes   • History of nephrectomy [Z90.5]  Not Applicable   • Abnormal urinalysis [R82.90]  Yes   • Recurrent right pleural effusion [J90]  Yes   • 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        Hospital Course     Mr. Sequeira is a 85 y.o. male with a history of CVA, TIA, chronic LLE weakness, DM2, atrial fibrillation, chronic anticoagulation, mechanical valve, HLD, HTN who presented to [facility] initially complaining of melena and BRBPR with weakness.  Please see the admitting history and physical for further details.  He was found to have Hgb of 7.2 and positive occult blood and was admitted to the hospital for further evaluation and treatment. Anticoagulation was held. He was given PRBC's. GI was consulted. He underwent tagged RBC scan showing bleeding in RLQ, distal ileum vs proximal colon. Once INR was in appropriate range, he underwent colonoscopy that showed AVM which was treated. He has been followed by  Cardiology, Nephrology, and Pulmonology. He was started on heparin gtt by Cardiology without evidence of re-bleeding. He was eventually restarted on Warfarin. Hgb has remained stable. INR is subtherapeutic at discharge; he will resume lovenox bridge until INR therapeutic. INR is monitored with home device. Nephrology has followed for management of BYRON on CKD III; diuretics have been adjusted. Pulmonology has also followed for pleural effusion likely from CHF, s/p thoracentesis. Repeat CXR today showed slowly improving stable rt pleural effusion; he will need repeat CXR in 4-6 weeks, sooner if symptomatic. Consultants have cleared pt for discharge with follow ups as below. Medically he is stable to discharge home with wife and home health services.    Day of Discharge     Subjective:  No complaints. Denies pain, shortness of breath. Pt and wife agree with discharge home today    Physical Exam:  Temp:  [97.7 °F (36.5 °C)-98.3 °F (36.8 °C)] 97.7 °F (36.5 °C)  Heart Rate:  [51-80] 51  Resp:  [16-20] 16  BP: (108-128)/(58-77) 108/58  Body mass index is 21.24 kg/m².  Physical Exam  Vitals and nursing note reviewed.   Constitutional:       General: He is not in acute distress.  HENT:      Head: Normocephalic.      Mouth/Throat:      Mouth: Mucous membranes are moist.   Eyes:      Conjunctiva/sclera: Conjunctivae normal.   Cardiovascular:      Rate and Rhythm: Normal rate and regular rhythm.   Pulmonary:      Effort: Pulmonary effort is normal. No respiratory distress.      Breath sounds: Examination of the right-lower field reveals decreased breath sounds. Examination of the left-lower field reveals decreased breath sounds. Decreased breath sounds present.   Abdominal:      General: Bowel sounds are normal.      Palpations: Abdomen is soft.   Musculoskeletal:      Cervical back: Neck supple.      Right lower leg: No edema.      Left lower leg: No edema.   Skin:     General: Skin is warm and dry.   Neurological:      Mental  Status: He is alert. Mental status is at baseline.   Psychiatric:         Mood and Affect: Mood normal.         Behavior: Behavior normal.         Consultants     Consult Orders (all) (From admission, onward)     Start     Ordered    11/05/21 1226  Inpatient Nephrology Consult  Once        Specialty:  Nephrology  Provider:  Jeff Dominguez MD    11/05/21 1225    11/04/21 1233  Inpatient General Surgery Consult  Once        Specialty:  General Surgery  Provider:  Angelica Langford MD    11/04/21 1233    11/02/21 1629  Inpatient Pulmonology Consult  Once        Specialty:  Pulmonary Disease  Provider:  Jonathan Blackmon MD    11/02/21 1628    11/02/21 0726  Inpatient Cardiology Consult  Once        Specialty:  Cardiology  Provider:  Griffin Fried MD    11/02/21 0726    11/02/21 0530  [facility] (on-call MD unless specified)  Once        Specialty:  Cardiology  Provider:  (Not yet assigned)    11/02/21 0529    11/02/21 0519  Inpatient Gastroenterology Consult  Once        Specialty:  Gastroenterology  Provider:  Al Gerard MD    11/02/21 0520    11/02/21 0506  Gastroenterology (on-call MD unless specified)  Once        Specialty:  Gastroenterology  Provider:  Dinesh Meza MD    11/02/21 0507    11/02/21 0446  [facility] (on-call MD unless specified) Details  Once        Specialty:  Hospitalist  Provider:  (Not yet assigned)    11/02/21 0445    11/02/21 0446  Gastroenterology (on-call MD unless specified)  Once        Specialty:  Gastroenterology  Provider:  (Not yet assigned)    11/02/21 0445              Procedures     COLONOSCOPY to cecum with APC cautery of AVM and clip placement x1      Imaging Results (All)     Procedure Component Value Units Date/Time    XR Chest 2 View [[id number]] Collected: 11/16/21 1129     Updated: 11/16/21 1133    Narrative:      XR CHEST 2 VW-  11/16/2021     HISTORY: Pleural effusion.     Heart size is mildly enlarged. There is increased density in the right  lung base  likely related to pleural fluid atelectasis and/or pneumonia.  This finding appears similar to the 11/11/2021 study. There may be some  minimal pleural fluid blunting the left costophrenic sulcus.     No pneumothorax is seen.     Sternotomy wires are seen.       Impression:      . No significant change since the previous study of  11/11/2021.     This report was finalized on 11/16/2021 11:30 AM by Dr. Onel Capps M.D.       FL Video Swallow With Speech Single Contrast [[id number]] Collected: 11/12/21 1412     Updated: 11/12/21 1437    Narrative:      VIDEO SWALLOW STUDY     HISTORY: Dysphagia.     2 minutes 13 seconds fluoroscopy was provided for the speech pathologist  during a video swallow study. [id number] images were saved.     Aspiration was visualized with multiple consistencies. Penetration was  also seen.       Impression:      Fluoroscopy was provided for the speech pathologist during a  video swallow study. For full details please see the speech pathology  report     This report was finalized on 11/12/2021 2:34 PM by Dr. Antonio Hart M.D.       XR Chest 2 View [[id number]] Collected: 11/11/21 1605     Updated: 11/11/21 1610    Narrative:      TWO-VIEW CHEST     HISTORY: Follow-up of right pleural effusion. Recent right  thoracentesis.     FINDINGS: The post thoracentesis chest x-ray shows marked reduction in  size of a previous large right pleural effusion noted on the study of 3  days ago. The remains a small right pleural effusion and atelectasis in  the lungs are otherwise clear. There is no pneumothorax. The heart  remains top normal in size with a mitral valve prosthesis in place.     This report was finalized on 11/11/2021 4:07 PM by Dr. Connor Mayorga M.D.       US Thoracentesis [[id number]] Collected: 11/11/21 0951    Specimen: Body Fluid Updated: 11/11/21 0954    Narrative:      ULTRASOUND-GUIDED RIGHT THORACENTESIS.     HISTORY: Pleural effusion.     After signed informed consent was obtained  the patient was prepped and  draped in the usual sterile fashion. Lidocaine was used for local  anesthesia.     Ultrasound guidance was used to place a 5 Italian Yueh catheter into the  right pleural fluid collection. 1.6 L of clear yellow fluid was removed.  Sample was sent to the lab.     Confirmatory images were obtained.     Patient tolerated the procedure well with no complications.       Impression:      Ultrasound-guided right thoracentesis as described.        This report was finalized on 11/11/2021 9:51 AM by Dr. Manuel Mclean M.D.       XR Chest 1 View [[id number]] Collected: 11/08/21 1825     Updated: 11/08/21 1829    Narrative:      ONE VIEW PORTABLE CHEST AT 5:54 PM     HISTORY: Follow-up of right pleural effusion.     FINDINGS: There is mild cardiomegaly with sternal wires from previous  cardiac surgery. There is a moderately large right pleural effusion with  adjacent atelectasis and this is similar to the study of 11/02/2021. No  new abnormality is seen.     This report was finalized on 11/8/2021 6:26 PM by Dr. Connor Mayorga M.D.       CT Abdomen Pelvis Without Contrast [[id number]] Collected: 11/04/21 1959     Updated: 11/04/21 2036    Narrative:      CT ABDOMEN PELVIS WO CONTRAST-     Radiation dose reduction techniques were utilized, including automated  exposure control and exposure modulation based on body size.     CLINICAL: Abdominal pain, anticoagulated, GI bleed     COMPARISON 11/02/2021     FINDINGS: Since 11/02/2021 there has been interval development of a  bilobed complex collection seen within the right rectus muscle. The  upper component measures 9.9 cm transverse, 5.4 cm AP and 12.6 cm  craniocaudal. The lower component measures 6.6 cm AP, 8 cm transverse  and 14.5 cm craniocaudal. The appearance most consistent with rectus  hematoma. There is fairly dense blush demonstrated within the lower  component, suggesting possible active bleeding.     There is diffuse body wall edema. There  is a large right-sided pleural  effusion again demonstrated with compressive atelectasis of the right  lower lobe. The amount of fluid appears slightly greater compared to the  previous examination. New small left-sided pleural effusion has  developed.     There is cardiac enlargement.     The stomach and duodenum have a stable satisfactory appearance. There  are gallstones within the dependent portion of the gallbladder, no  gallbladder wall thickening or biliary duct dilatation. Tiny hepatic  cysts again suggested. The liver is stable. There is a small amount of  free intraperitoneal fluid demonstrated along the inferior liver edge.  Attenuation compatible with serous fluid. The pancreas and spleen and  left adrenal gland have a satisfactory appearance. The right kidney has  been removed. No left-sided obstructive uropathy or calculus, the left  kidney is similar to the prior examination. Diameter of the aorta is  within normal limits.     Urine within the bladder is increased in density, this could be  secondary to some residual contrast from recent CT or hemorrhage.  Correlation with urinalysis.     There is diverticulosis of the colon. This is most pronounced in the  sigmoid region. There is a minimal amount of pericolonic induration at  this location and acute diverticulitis not excluded. This edema could  also be secondary to mesenteric congestion.     No free intraperitoneal air. The appendix is normal. The small bowel is  satisfactory in appearance.     CONCLUSION:  1. Right-sided pleural effusion appears slightly larger compared to the  previous examination, new small left-sided pleural effusion has  developed. There is diffuse body wall edema and cardiac enlargement.  Small amount of free fluid is demonstrated within the peritoneal cavity.  2. Sizable bilobed collection has developed within the right rectus  abdominis muscle, consistent with hematoma. See above dimensions.  3. Diverticulosis of the colon,  subtle pericolonic induration about the  mid sigmoid suggests possible diverticulitis.  4. Urine demonstrates increased density, perhaps related to residual  contrast from previous CT or hemorrhage. Correlation with urinalysis  advised.  5. Gallstones.         This report was finalized on 11/4/2021 8:33 PM by Dr. Adrian Brand M.D.       NM GI Blood Loss [[id number]] Collected: 11/02/21 1606     Updated: 11/02/21 1754    Narrative:      NUCLEAR MEDICINE GI BLEED STUDY     HISTORY: Colonoscopy with multiple cold snare polypectomy 10/28/2021;  anticoagulated. GI bleed.     TECHNIQUE: 28 mCi of technetium was used to label the patient's red  blood cells which were reinjected. Images of the abdomen and pelvis were  acquired for one hour. These are correlated with abdomen and pelvis CT  from earlier this morning.     FINDINGS: There is normal radiotracer activity in the heart, spleen,  liver, genitals, and abdominal and pelvic major blood vessels.     Focal, abnormal radiotracer uptake is apparent early and continuously  throughout the exam and the right lower quadrant. This localizes to the  region of the distal terminal ileum or the cecum. A second hour of  imaging to follow the subsequent distribution of the radiotracer was not  performed as the patient needed to discontinue the exam after one hour.       Impression:      Active GI bleeding from the right lower quadrant in the  region of either the distal ileum or the proximal ascending colon. I  discussed the case with Dr. Al Gerard at around 3:45 PM on the  day of the exam.     This report was finalized on 11/2/2021 5:51 PM by Dr. Grant Jorge M.D.       XR Chest 1 View [[id number]] Collected: 11/02/21 1632     Updated: 11/02/21 1637    Narrative:      XR CHEST 1 VW-     HISTORY: Male who is 85 years-old,  pleural effusion     TECHNIQUE: Frontal view of the chest     COMPARISON: 10/23/2021     FINDINGS: The heart size is borderline. Sternotomy wires are  present.  Pulmonary vasculature is unremarkable. Moderate right pleural effusion  appears decreased from the prior exam. Atelectasis/infiltrate is  apparent in the right lower lung. No pneumothorax. No acute osseous  process.       Impression:      Moderate right pleural effusion appears decreased from the  prior exam. Atelectasis/infiltrate is apparent in the right lower lung.  Continued follow-up recommended.       This report was finalized on 11/2/2021 4:34 PM by Dr. Chava Luke M.D.       CT Abdomen Pelvis With Contrast [[id number]] Collected: 11/02/21 0528     Updated: 11/02/21 0541    Narrative:      CT OF THE ABDOMEN AND PELVIS WITH CONTRAST     HISTORY: GI bleeding     COMPARISON: 10/25/2021 and other prior imaging     TECHNIQUE: Axial CT imaging was obtained through the abdomen and pelvis.  IV contrast was administered.     FINDINGS:  Large right pleural effusion is again noted. It appears slightly smaller  than on prior exam. A previously identified left pleural effusion has  almost completely resolved. The stomach and duodenum appear  unremarkable. There is cholelithiasis. Right kidney is absent. Suspected  tiny cyst are seen within the liver. Spleen and pancreas appear within  normal limits. There is no hydronephrosis. The patient has multiple tiny  low-attenuation lesions within the left kidney. Several of these measure  higher in density than simple cysts. Further assessment with renal  protocol CT or MRI is recommended. There is calcification of the aorta.  Urinary bladder is unremarkable. Prostate gland appears to be absent.  There is colonic diverticulosis. There is no evidence of diverticulitis.  Stool burden does appear mildly increased, which may reflect some  constipation. There is no evidence of mechanical bowel obstruction. The  appendix is normal. No acute osseous abnormalities are seen.       Impression:         1. No obvious etiology for the patient's GI bleeding is  identified.  Patient does have colonic diverticulosis. There is no diverticulitis.  2. Large right pleural effusion. This may be slightly smaller than on  prior study. Previously identified left pleural effusion has almost  completely resolved.     Radiation dose reduction techniques were utilized, including automated  exposure control and exposure modulation based on body size.     This report was finalized on 11/2/2021 5:38 AM by Dr. Yue Frye M.D.           Results for orders placed during the hospital encounter of 03/12/20    Duplex Venous Lower Extremity - Left    Interpretation Summary  · Normal left lower extremity venous duplex scan.    Results for orders placed during the hospital encounter of 10/23/21    Adult transthoracic echo complete    Interpretation Summary  · Estimated left ventricular EF = 60% Left ventricular systolic function is normal.  · Left ventricular diastolic function was indeterminate.  · The right ventricular cavity is mild to moderately dilated.  · Left atrial volume is moderately increased.  · The right atrial cavity is moderately dilated.  · There is a mechanical aortic valve present. The aortic valve peak and mean gradients are within defined limits.  · Moderate tricuspid valve regurgitation is present.  · Severe pulmonary hypertension is present. Calculated right ventricular systolic pressure from tricuspid regurgitation is 63.7 mmHg.    Pertinent Labs     Results from last 7 days   Lab Units 11/16/21  0511 11/15/21  0055 11/14/21  0613 11/13/21  0624   WBC 10*3/mm3 4.79 5.33 5.42 5.28   HEMOGLOBIN g/dL 9.8* 9.0* 10.1* 9.3*   PLATELETS 10*3/mm3 235 214 258 224     Results from last 7 days   Lab Units 11/16/21  0511 11/15/21  0055 11/14/21  0613 11/13/21  0624   SODIUM mmol/L 139 142 138 140   POTASSIUM mmol/L 3.6 3.7 3.7 4.0   CHLORIDE mmol/L 103 104 102 106   CO2 mmol/L 26.3 28.2 24.9 27.4   BUN mg/dL 27* 27* 22 18   CREATININE mg/dL 1.40* 1.48* 1.21 1.20   GLUCOSE mg/dL  138* 111* 156* 136*   EGFR IF NONAFRICN AM mL/min/1.73 48* 45* 57* 58*     Results from last 7 days   Lab Units 11/16/21  0511 11/15/21  0055 11/14/21  0613 11/13/21  0624   ALBUMIN g/dL 2.70* 2.70* 3.00* 2.40*     Results from last 7 days   Lab Units 11/16/21  0511 11/15/21  0055 11/14/21  0613 11/13/21  0624   CALCIUM mg/dL 8.6 8.1* 8.3* 8.1*   ALBUMIN g/dL 2.70* 2.70* 3.00* 2.40*   MAGNESIUM mg/dL 2.0 1.9 1.9  --    PHOSPHORUS mg/dL 3.0 3.3 3.1 3.0               Invalid input(s): LDLCALC  Results from last 7 days   Lab Units 11/11/21  0907   BODYFLDCX  No growth at 5 days         Test Results Pending at Discharge     Pending Labs     Order Current Status    AFB Culture - Body Fluid, Pleural Cavity Preliminary result          Discharge Details        Discharge Medications      New Medications      Instructions Start Date   diphenhydrAMINE 25 mg capsule  Commonly known as: BENADRYL  Replaces: diphenhydrAMINE 25 MG tablet   25 mg, Oral, 2 Times Daily PRN         Changes to Medications      Instructions Start Date   enoxaparin 80 MG/0.8ML solution syringe  Commonly known as: Lovenox  What changed: how much to take   waste 0.1 ml and Inject 0.7 mL under the skin into the appropriate area as directed Every 12 (Twelve) Hours for 14 days.      furosemide 20 MG tablet  Commonly known as: LASIX  What changed:   medication strength  See the new instructions.   Take 2 tablets (40 mg) by mouth Every Morning AND 1 tablet(20 mg) Every Evening for 30 days      mupirocin 2 % ointment  Commonly known as: BACTROBAN  What changed:   how much to take  how to take this  when to take this  additional instructions   APPLY TO AFFECTED AREAS THREE TIMES A DAY      warfarin 5 MG tablet  Commonly known as: COUMADIN  What changed: See the new instructions.   TAKE 1 AND 1/2 TABLET BY MOUTH DAILY ON MONDAY, WEDNESDAY, AND FRIDAY.  TAKE ONE TABLET BY MOUTH DAILY ON ALL OTHER DAYS OF THE WEEK         Continue These Medications      Instructions  Start Date   aspirin 81 MG EC tablet   81 mg, Oral, Daily      atorvastatin 40 MG tablet  Commonly known as: LIPITOR   40 mg, Oral, Daily      digoxin 125 MCG tablet  Commonly known as: LANOXIN   125 mcg, Oral, Daily Digoxin      ferrous gluconate 324 MG tablet  Commonly known as: FERGON   324 mg, Oral, Every Other Day      fish oil 1000 MG capsule capsule   4,000 mg, Oral, Daily With Breakfast      guaiFENesin 600 MG 12 hr tablet  Commonly known as: MUCINEX   600 mg, Oral, Every 12 Hours      insulin lispro 100 UNIT/ML injection  Commonly known as: humaLOG   USE AS DIRECTED WITH V-GO PUMP      [employer] Autolet Lancing Device misc   1 each, Does not apply, 3 Times Daily      [employer] Blood Glucose kit   1 each, Does not apply, 3 Times Daily      [employer] Blood Glucose Test test strip  Generic drug: glucose blood   1 each, Other, 3 Times Daily      magnesium oxide 400 MG tablet  Commonly known as: MAG-OX   400 mg, Oral, 2 Times Daily      metoprolol succinate XL 25 MG 24 hr tablet  Commonly known as: TOPROL-XL   25 mg, Oral, Daily      pantoprazole 40 MG EC tablet  Commonly known as: PROTONIX   40 mg, Oral, Daily      Pen Needles 32G X 4 MM misc   1 each, Does not apply, Daily PRN, Use with Tresiba       TRESIBA FLEXTOUCH SC   30-65 Units, Subcutaneous, Daily PRN, Use as directed if blood sugar gets too high      V-Go 40 kit   USE AS DIRECTED THREE TIMES A DAY         Stop These Medications    diphenhydrAMINE 25 MG tablet  Commonly known as: BENADRYL  Replaced by: diphenhydrAMINE 25 mg capsule     sodium bicarbonate 650 MG tablet            Allergies   Allergen Reactions   • Other Other (See Comments)     Grass, ragweed   • Penicillins Swelling   • Percocet [Oxycodone-Acetaminophen] Nausea And Vomiting       Discharge Disposition:  Home or Self Care      Discharge Diet:  Diet Order   Procedures   • Diet Dysphagia; IV - Mechanical Soft No Mixed Consistencies; Consistent Carbohydrate       Discharge Activity:   Activity  Instructions     Activity as Tolerated            CODE STATUS:    Code Status and Medical Interventions:   Ordered at: 11/02/21 0520     Code Status (Patient has no pulse and is not breathing):    CPR (Attempt to Resuscitate)     Medical Interventions (Patient has pulse or is breathing):    Full       Future Appointments   Date Time Provider Department Center   11/30/2021 10:00 AM Harika Alanis APRN [facility]   12/2/2021  9:45 AM Rubin Hinkle APRN [facility]   8/3/2022  8:00 AM Griffin Fried MD [facility]      Contact information for follow-up providers     Harika Alanis APRN. Schedule an appointment as soon as possible for a visit in 2 week(s).    Specialties: Cardiology, Nurse Practitioner  Why: Follow up in 2-3 weeks  Contact information:  [address]  [place] [address]  [phone]             Jonathan Blackmon MD. Schedule an appointment as soon as possible for a visit in 4 week(s).    Specialties: Pulmonary Disease, Sleep Medicine  Why: Follow up chest xray in 4-6 weeks, sooner if symptomatic  Contact information:  [address]  [place] [address]  [phone]             Jeff Dominguez MD Follow up in 1 week(s).    Specialty: Nephrology  Why: Follow up in Nephrology clinic in 1 week  Contact information:  [address]  [facility] [address]  [place] [address]  [phone]             Rubin Hinkle APRN. Schedule an appointment as soon as possible for a visit in 3 day(s).    Specialties: Family Medicine, Urgent Care  Why: Hospital follow up  Contact information:  [address]  [facility] [id number] [phone]             Rubin Hinkle APRN .    Specialties: Family Medicine, Urgent Care  Contact information:  [address] [facility] [id number] [phone]                   Contact information for after-discharge care     Home Medical Care     [facility] .    Service: Home Health  Services  Contact information:  [address] [facility] [address]  [facility] [address] [phone]                             Time Spent on Discharge:  Greater than 30 minutes      LIZA Saul  [facility]  11/16/21  14:48 EST

## 2021-11-17 ENCOUNTER — HOME CARE VISIT (OUTPATIENT)
Dept: HOME HEALTH SERVICES | Facility: HOME HEALTHCARE | Age: 86
End: 2021-11-17

## 2021-11-17 ENCOUNTER — TRANSITIONAL CARE MANAGEMENT TELEPHONE ENCOUNTER (OUTPATIENT)
Dept: CALL CENTER | Facility: HOSPITAL | Age: 86
End: 2021-11-17

## 2021-11-17 ENCOUNTER — ANTICOAGULATION VISIT (OUTPATIENT)
Dept: PHARMACY | Facility: HOSPITAL | Age: 86
End: 2021-11-17

## 2021-11-17 DIAGNOSIS — I63.411 CEREBROVASCULAR ACCIDENT (CVA) DUE TO EMBOLISM OF RIGHT MIDDLE CEREBRAL ARTERY (HCC): ICD-10-CM

## 2021-11-17 DIAGNOSIS — Z95.2 HX OF AORTIC VALVE REPLACEMENT, MECHANICAL: Primary | ICD-10-CM

## 2021-11-17 LAB — INR PPP: 2.2

## 2021-11-17 PROCEDURE — G0299 HHS/HOSPICE OF RN EA 15 MIN: HCPCS

## 2021-11-17 NOTE — PROGRESS NOTES
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]             Anticoagulation Episode Summary     INR check location:      Preferred lab:      Send INR reminders to:  [facility]  POOL    Comments:  New INR goal 3 - 3.5 (see 2020 hospitalization for CVA)      Anticoagulation Care Providers     Provider Role Specialty Phone number    Griffin Fried MD Referring Cardiology [phone]          Clinic Interview:  Patient Findings     Positives:  Change in medications, Hospital admission, Other complaints    Negatives:  Signs/symptoms of thrombosis, Signs/symptoms of bleeding,   Laboratory test error suspected, Change in health, Change in alcohol use,   Change in activity, Upcoming invasive procedure, Emergency department   visit, Upcoming dental procedure, Missed doses, Extra doses, Change in   diet/appetite, Bruising    Comments:  Admitted for GI bleed 21-21. Warfarin was held   until 21. Since that time, he was given warfarin 7.5 mg x 2 days,   then 10 mg x 2 days. Discharged on enoxaparin 70 mg every 12 hours.       Clinical Outcomes     Negatives:  Major bleeding event, Thromboembolic event,   Anticoagulation-related hospital admission, Anticoagulation-related ED   visit, Anticoagulation-related fatality    Comments:  Admitted for GI bleed 21-21. Warfarin  was held   until 11/13/21. Since that time, he was given warfarin 7.5 mg x 2 days,   then 10 mg x 2 days. Discharged on enoxaparin 70 mg every 12 hours.         INR History:  Anticoagulation Monitoring 10/20/2021 11/1/2021 11/17/2021   INR 3.20 1.80 2.20   INR Date 10/20/2021 11/1/2021 11/17/2021   INR Goal 3.0-3.5 3.0-3.5 3.0-3.5   Trend Same Same Same   Last Week Total 42.5 mg 20 mg 35 mg   Next Week Total 42.5 mg 45 mg 42.5 mg   Sun 5 mg - -   Mon 7.5 mg 10 mg (11/1) -   Tue 5 mg - -   Wed 7.5 mg - 7.5 mg   Thu 5 mg - -   Fri 7.5 mg - -   Sat 5 mg - -   Visit Report - - -   Some recent data might be hidden       Plan:  1. INR is Subtherapeutic today- see above in Anticoagulation Summary.   Will instruct Francisco Sequeira to take warfarin 7.5 mg today - see above in Anticoagulation Summary.  2. Continue enoxaparin 70 mg every 12 hours.  3. Follow up in 1 day  4. They have been instructed to call if any changes in medications, doses, concerns, etc. Patient expresses understanding and has no further questions at this time.    Vasquez Niño, PharmD

## 2021-11-17 NOTE — HOME HEALTH
Per family Sunday he was a little sluggish after admission   she took him to [facility] for lab work, they grabed dinner and went home, he was still fatigued   and when he went to bathroom it was straight blood , to er and admitted   recevied several units of blood , He was 11/2 to 11/16 and is now home   Per wife they found that he was bleeding from intestine / colon where they meet.     He is up with walker, A&O x 3.   has hx of dm, stroke, left sided weakness, htn, and thoracentesis x 2 now removing 1 to 2 liters of fluid, no chf diag but comment about it being possible in note  NEW MEDS, BENADRYL, CHANGED DOSING LOVENOX, LASIX , WARFARIN, AND BACTROBAN    BP WAS LOW AT VISIT , WIFE REPORTS HE IS NOT DRINKING A LOT T/I ON NEED TO DRINK MORE,   AT LEAST 4 OF HIS GLASSES

## 2021-11-17 NOTE — OUTREACH NOTE
Call Center TCM Note      Responses   [facility] patient discharged from? [place]   Does the patient have one of the following disease processes/diagnoses(primary or secondary)? Other   TCM attempt successful? Yes   Call start time 1154   Call end time 1157   Discharge diagnosis Lower GI bleed Angiodysplasia of colon without hemorrhage Hemiparesis of left nondominant side    Person spoke with today (if not patient) and relationship Gladys-spouse and patient   Meds reviewed with patient/caregiver? Yes   Is the patient having any side effects they believe may be caused by any medication additions or changes? No   Does the patient have all medications ordered at discharge? No   What is keeping the patient from filling the prescriptions? --  [spouse is waiting for notification form Pharmacy that meds are ready to be picked up]   Nursing Interventions Nurse provided patient education  [Enc spouse to call Pharmacy to check status]   Is the patient taking all medications as directed (includes completed medication regime)? Yes   Comments regarding appointments Cards appt on 11/30/21 @10am   Does the patient have a primary care provider?  Yes   Does the patient have an appointment with their PCP within 7 days of discharge? Yes   Comments regarding PCP Hospital d/c f/u appt on 11/19/21 @ 2:15pm   Has the patient kept scheduled appointments due by today? N/A   What is the Home health agency?  [facility]    Has home health visited the patient within 72 hours of discharge? Yes   Psychosocial issues? No   Did the patient receive a copy of their discharge instructions? Yes   Nursing interventions Reviewed instructions with patient   What is the patient's perception of their health status since discharge? Improving   Is the patient/caregiver able to teach back signs and symptoms related to disease process for when to call PCP? Yes   Is the patient/caregiver able to teach back signs and symptoms related to disease process  for when to call 911? Yes   Is the patient/caregiver able to teach back the hierarchy of who to call/visit for symptoms/problems? PCP, Specialist, Home health nurse, Urgent Care, ED, 911 Yes   TCM call completed? Yes          BIRDIE GOODWIN RN    11/17/2021, 12:02 EST

## 2021-11-18 ENCOUNTER — ANTICOAGULATION VISIT (OUTPATIENT)
Dept: PHARMACY | Facility: HOSPITAL | Age: 86
End: 2021-11-18

## 2021-11-18 VITALS
TEMPERATURE: 98 F | OXYGEN SATURATION: 99 % | SYSTOLIC BLOOD PRESSURE: 92 MMHG | DIASTOLIC BLOOD PRESSURE: 48 MMHG | RESPIRATION RATE: 20 BRPM | HEART RATE: 72 BPM

## 2021-11-18 DIAGNOSIS — I63.411 CEREBROVASCULAR ACCIDENT (CVA) DUE TO EMBOLISM OF RIGHT MIDDLE CEREBRAL ARTERY (HCC): ICD-10-CM

## 2021-11-18 DIAGNOSIS — Z95.2 HX OF AORTIC VALVE REPLACEMENT, MECHANICAL: Primary | ICD-10-CM

## 2021-11-18 LAB — INR PPP: 4.2

## 2021-11-18 NOTE — PROGRESS NOTES
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]             Anticoagulation Episode Summary     INR check location:      Preferred lab:      Send INR reminders to:  [facility]  POOL    Comments:  New INR goal 3 - 3.5 (see 2020 hospitalization for CVA)      Anticoagulation Care Providers     Provider Role Specialty Phone number    Griffin Fried MD Referring Cardiology [phone]          Clinic Interview:  Patient Findings     Negatives:  Signs/symptoms of thrombosis, Signs/symptoms of bleeding,   Laboratory test error suspected, Change in health, Change in alcohol use,   Change in activity, Upcoming invasive procedure, Emergency department   visit, Upcoming dental procedure, Missed doses, Extra doses, Change in   medications, Change in diet/appetite, Hospital admission, Bruising, Other   complaints    Comments:  Denies any s/sx of bleeding this morning or with any recent   bowel movements. Enoxaparin was last administered yesterday evening.       Clinical Outcomes     Negatives:  Major bleeding event, Thromboembolic event,   Anticoagulation-related hospital admission, Anticoagulation-related ED   visit, Anticoagulation-related fatality    Comments:  Denies any s/sx of bleeding this morning or with any recent   bowel movements. Enoxaparin was last administered yesterday evening.         INR History:  Anticoagulation Monitoring  11/1/2021 11/17/2021 11/18/2021   INR 1.80 2.20 4.20   INR Date 11/1/2021 11/17/2021 11/18/2021   INR Goal 3.0-3.5 3.0-3.5 3.0-3.5   Trend Same Same Same   Last Week Total 20 mg 35 mg 42.5 mg   Next Week Total 45 mg 42.5 mg 37.5 mg   Sun - - -   Mon 10 mg (11/1) - -   Tue - - -   Wed - 7.5 mg -   Thu - - Hold (11/18)   Fri - - -   Sat - - -   Visit Report - - -   Some recent data might be hidden       Plan:  1. INR is Supratherapeutic today- see above in Anticoagulation Summary.   Will instruct Francisco Sequeira to DISCONTINUE enoxaparin and HOLD their warfarin regimen today - see above in Anticoagulation Summary.  2. Follow up in 1 day with another home INR test.  3. They have been instructed to call if any changes in medications, doses, concerns, etc. To seek immediate medical attention if s/sx of bleeding develop or fall occurs. Patient expresses understanding and has no further questions at this time.    Vasquez Niño, PharmD

## 2021-11-19 ENCOUNTER — HOME CARE VISIT (OUTPATIENT)
Dept: HOME HEALTH SERVICES | Facility: HOME HEALTHCARE | Age: 86
End: 2021-11-19

## 2021-11-19 ENCOUNTER — ANTICOAGULATION VISIT (OUTPATIENT)
Dept: PHARMACY | Facility: HOSPITAL | Age: 86
End: 2021-11-19

## 2021-11-19 ENCOUNTER — OFFICE VISIT (OUTPATIENT)
Dept: FAMILY MEDICINE CLINIC | Facility: CLINIC | Age: 86
End: 2021-11-19

## 2021-11-19 VITALS
SYSTOLIC BLOOD PRESSURE: 118 MMHG | TEMPERATURE: 97.1 F | OXYGEN SATURATION: 99 % | HEART RATE: 81 BPM | WEIGHT: 156 LBS | BODY MASS INDEX: 21.13 KG/M2 | DIASTOLIC BLOOD PRESSURE: 58 MMHG | HEIGHT: 72 IN

## 2021-11-19 VITALS
SYSTOLIC BLOOD PRESSURE: 120 MMHG | HEART RATE: 68 BPM | DIASTOLIC BLOOD PRESSURE: 66 MMHG | OXYGEN SATURATION: 98 % | RESPIRATION RATE: 18 BRPM

## 2021-11-19 DIAGNOSIS — Z09 HOSPITAL DISCHARGE FOLLOW-UP: Primary | ICD-10-CM

## 2021-11-19 DIAGNOSIS — K92.2 LOWER GI BLEED: ICD-10-CM

## 2021-11-19 DIAGNOSIS — Z95.2 HX OF AORTIC VALVE REPLACEMENT, MECHANICAL: Primary | ICD-10-CM

## 2021-11-19 DIAGNOSIS — J90 PLEURAL EFFUSION: ICD-10-CM

## 2021-11-19 DIAGNOSIS — N18.32 STAGE 3B CHRONIC KIDNEY DISEASE (HCC): ICD-10-CM

## 2021-11-19 DIAGNOSIS — I63.411 CEREBROVASCULAR ACCIDENT (CVA) DUE TO EMBOLISM OF RIGHT MIDDLE CEREBRAL ARTERY (HCC): ICD-10-CM

## 2021-11-19 LAB — INR PPP: 3.2

## 2021-11-19 PROCEDURE — 1111F DSCHRG MED/CURRENT MED MERGE: CPT | Performed by: NURSE PRACTITIONER

## 2021-11-19 PROCEDURE — G0151 HHCP-SERV OF PT,EA 15 MIN: HCPCS

## 2021-11-19 PROCEDURE — 99495 TRANSJ CARE MGMT MOD F2F 14D: CPT | Performed by: NURSE PRACTITIONER

## 2021-11-19 NOTE — HOME HEALTH
"SUBJECTIVE: \"I'm glad to be home finally and doing ok.\" Patient reported he fell 2 nights ago trying to get clothes out of dresser when he let go of walker but no injury per patient or spouse.    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

## 2021-11-19 NOTE — PROGRESS NOTES
Transitional Care Follow Up Visit  Subjective     Francisco Sequeira is a 85 y.o. male who presents for a transitional care management visit.    Within 48 business hours after discharge our office contacted him via telephone to coordinate his care and needs.      I reviewed and discussed the details of that call along with the discharge summary, hospital problems, inpatient lab results, inpatient diagnostic studies, and consultation reports with Francisco.     Current outpatient and discharge medications have been reconciled for the patient.  Reviewed by: LIZA Trinh      Date of TCM Phone Call 11/16/2021   [facility]   Date of Admission 11/2/2021   Date of Discharge 11/16/2021   Discharge Disposition Home or Self Care     Risk for Readmission (LACE) Score: 17 (11/16/2021  6:01 AM)      History of Present Illness   Course During Hospital Stay:  First time went 10/23-/10/30 for possible stroke and all test were normal, sent home and went back    Patient presented to the emergency room on 11/2/2021 complaining of blood in his stool.  He was found to have a hemoglobin of 7.2 and positive occult blood and was admitted to the hospital for further evaluation and treatment.  He was given packed red blood cells GI was consulted.  He underwent a tagged RBC scan showing bleeding in the right left quadrant distal ileum versus proximal colon.  Underwent a colonoscopy that showed AVM which was treated.  He was eventually restarted on warfarin hemoglobin remained stable INR was subtherapeutic at discharge Lovenox bridge until INR becomes therapeutic.  INR is being monitored with a home device.  Nephrology follow-up for management of acute kidney injury on CKD three.  Diuretics were adjusted.  Pulmonology followed for pleural effusion likely from CHF status post thoracentesis.  Repeat chest x-ray on discharge showed slowly improving stable right pleural effusion.  He needs follow-up chest x-ray in 4 to 6 weeks sooner  "if symptomatic.  Follows up with pulmonary but no appointment made yet.  Cardiologist appointment is 11/30  Nephrologist is 11/24  Last lovenox shot was tuesday night   INR this morning was 3.2 on coumadin 7.5mg 3 days and 4days 5mg. Checks INR again on Monday    Pt states that he \"doesn't feel too bad\"  Wife states he is tired and not eating well.  No visible signs of bleeding     The following portions of the patient's history were reviewed and updated as appropriate: allergies, current medications, past family history, past medical history, past social history, past surgical history and problem list.    Review of Systems   Constitutional: Negative for fever.   Respiratory: Negative for cough and shortness of breath.    Cardiovascular: Negative for chest pain.   Gastrointestinal: Negative for abdominal pain.   Neurological: Negative for dizziness.       Objective   Physical Exam  Vitals reviewed.   Constitutional:       General: He is not in acute distress.     Appearance: He is well-developed.   HENT:      Head: Normocephalic.   Cardiovascular:      Rate and Rhythm: Normal rate and regular rhythm.      Heart sounds: Normal heart sounds.   Pulmonary:      Effort: Pulmonary effort is normal.      Breath sounds: Normal breath sounds.   Neurological:      Mental Status: He is alert and oriented to person, place, and time.      Gait: Gait normal.   Psychiatric:         Behavior: Behavior normal.         Thought Content: Thought content normal.         Judgment: Judgment normal.         Assessment/Plan   Diagnoses and all orders for this visit:    1. Hospital discharge follow-up (Primary)    2. Lower GI bleed    3. Pleural effusion    4. Stage 3b chronic kidney disease (HCC)        rto in 4 weeks for xray and cbc, iron panel  Keep all MD appointment with specialist    Mask and googles worn           "

## 2021-11-19 NOTE — PROGRESS NOTES
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]             Anticoagulation Episode Summary     INR check location:      Preferred lab:      Send INR reminders to:   [facility]  POOL    Comments:  New INR goal 3 - 3.5 (see 1/2020 hospitalization for CVA)      Anticoagulation Care Providers     Provider Role Specialty Phone number    Griffin Fried MD Referring Cardiology [phone]          Clinic Interview:  Patient Findings     Negatives:  Signs/symptoms of thrombosis, Signs/symptoms of bleeding,   Laboratory test error suspected, Change in health, Change in alcohol use,   Change in activity, Upcoming invasive procedure, Emergency department   visit, Upcoming dental procedure, Missed doses, Extra doses, Change in   medications, Change in diet/appetite, Hospital admission, Bruising, Other   complaints      Clinical Outcomes     Negatives:  Major bleeding event, Thromboembolic event,   Anticoagulation-related hospital admission, Anticoagulation-related ED   visit, Anticoagulation-related fatality        INR History:  Anticoagulation Monitoring 11/17/2021 11/18/2021 11/19/2021   INR 2.20 4.20 3.20   INR Date 11/17/2021 11/18/2021 11/19/2021   INR Goal 3.0-3.5 3.0-3.5 3.0-3.5   Trend Same Same Same   Last Week Total 35 mg 42.5 mg 42.5 mg   Next Week Total 42.5 mg 37.5 mg 42.5 mg   Sun  - - 5 mg   Mon - - -   Tue - - -   Wed 7.5 mg - -   Thu - Hold (11/18) -   Fri - - 7.5 mg   Sat - - 5 mg   Visit Report - - -   Some recent data might be hidden       Plan:  1. INR is Therapeutic today- see above in Anticoagulation Summary.   Will instruct Francisco Sequeira to resume their warfarin regimen- see above in Anticoagulation Summary.  2. Follow up in 3 days to monitor closely due to patients recent 20 lb weight loss  3.  They have been instructed to call if any changes in medications, doses, concerns, etc. Patient expresses understanding and has no further questions at this time.    Michelle Piña, [facility]

## 2021-11-22 ENCOUNTER — HOME CARE VISIT (OUTPATIENT)
Dept: HOME HEALTH SERVICES | Facility: HOME HEALTHCARE | Age: 86
End: 2021-11-22

## 2021-11-22 ENCOUNTER — ANTICOAGULATION VISIT (OUTPATIENT)
Dept: PHARMACY | Facility: HOSPITAL | Age: 86
End: 2021-11-22

## 2021-11-22 VITALS
DIASTOLIC BLOOD PRESSURE: 62 MMHG | RESPIRATION RATE: 18 BRPM | TEMPERATURE: 98.3 F | HEART RATE: 78 BPM | OXYGEN SATURATION: 100 % | SYSTOLIC BLOOD PRESSURE: 138 MMHG

## 2021-11-22 DIAGNOSIS — I63.411 CEREBROVASCULAR ACCIDENT (CVA) DUE TO EMBOLISM OF RIGHT MIDDLE CEREBRAL ARTERY (HCC): ICD-10-CM

## 2021-11-22 DIAGNOSIS — Z95.2 HX OF AORTIC VALVE REPLACEMENT, MECHANICAL: Primary | ICD-10-CM

## 2021-11-22 LAB — INR PPP: 3.2

## 2021-11-22 PROCEDURE — G0300 HHS/HOSPICE OF LPN EA 15 MIN: HCPCS

## 2021-11-22 NOTE — PROGRESS NOTES
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]             Anticoagulation Episode Summary     INR check location:      Preferred lab:      Send INR reminders to:   [facility]  POOL    Comments:  New INR goal 3 - 3.5 (see 1/2020 hospitalization for CVA)      Anticoagulation Care Providers     Provider Role Specialty Phone number    Griffin Fried MD Referring Cardiology [phone]          Clinic Interview:  Patient Findings     Negatives:  Signs/symptoms of thrombosis, Signs/symptoms of bleeding,   Laboratory test error suspected, Change in health, Change in alcohol use,   Change in activity, Upcoming invasive procedure, Emergency department   visit, Upcoming dental procedure, Missed doses, Extra doses, Change in   medications, Change in diet/appetite, Hospital admission, Bruising, Other   complaints      Clinical Outcomes     Negatives:  Major bleeding event, Thromboembolic event,   Anticoagulation-related hospital admission, Anticoagulation-related ED   visit, Anticoagulation-related fatality        INR History:  Anticoagulation Monitoring 11/18/2021 11/19/2021 11/22/2021   INR 4.20 3.20 3.20   INR Date 11/18/2021 11/19/2021 11/22/2021   INR Goal 3.0-3.5 3.0-3.5 3.0-3.5   Trend Same Same Same   Last Week Total 42.5 mg 42.5 mg 45 mg   Next Week Total 37.5 mg 42.5 mg 42.5 mg   Sun  - 5 mg -   Mon - - 7.5 mg   Tue - - 5 mg   Wed - - 7.5 mg   Thu Hold (11/18) - 5 mg   Fri - 7.5 mg -   Sat - 5 mg -   Visit Report - - -   Some recent data might be hidden       Plan:  1. INR is Therapeutic today- see above in Anticoagulation Summary.   Will instruct Francisco Sequeira to Continue their warfarin regimen- see above in Anticoagulation Summary.  2. Follow up in 4 days to ensure INR is stable   3. hey have been instructed to call if any changes in medications, doses, concerns, etc. Patient expresses understanding and has no further questions at this time.    Michelle Piña, [facility]

## 2021-11-23 ENCOUNTER — HOME CARE VISIT (OUTPATIENT)
Dept: HOME HEALTH SERVICES | Facility: HOME HEALTHCARE | Age: 86
End: 2021-11-23

## 2021-11-23 ENCOUNTER — READMISSION MANAGEMENT (OUTPATIENT)
Dept: CALL CENTER | Facility: HOSPITAL | Age: 86
End: 2021-11-23

## 2021-11-23 VITALS
TEMPERATURE: 97.4 F | HEART RATE: 62 BPM | RESPIRATION RATE: 18 BRPM | DIASTOLIC BLOOD PRESSURE: 82 MMHG | SYSTOLIC BLOOD PRESSURE: 118 MMHG

## 2021-11-23 PROCEDURE — G0157 HHC PT ASSISTANT EA 15: HCPCS

## 2021-11-23 NOTE — PROGRESS NOTES
"Enter Query Response Below      Query Response:     Chronic diastolic congestive heart failure          If applicable, please update the problem list.     Patient: Francisco Sequeira        : 1935  Account: [id number]           Admit Date: 2021        Options to Respond to Query:    1. Access the Encounter     a. From the To-Do Side bar, click Respond With Note.     b. Click New Note     c. Answer query within the yellow box.                d. Update the Problem List if applicable.     Dr. Coughlin:     Patient admitted with lower gastrointestinal bleed. H&P listed \"pleural effusion/congestive heart failure' as diagnoses. ProBNP on  was 766.6. Chest x-ray on  noted a moderate right pleural effusion, appears decreased from the prior exam. Patient received 6 units of packed red blood cells. Physician progress notes on - noted occasional right mid lung crackles on the physical exam. Patient had an echocardiogram on 10/23/2021 with a calculated EF of 60%. Follow up chest x-ray on  noted a moderately large right pleural effusion with adjacent atelectasis. Patient underwent a thoracentesis on 11/10 with removal of 1.6 liters of transudative fluid. There was no edema noted. Patient was treated with oral Lasix and oral Metoprolol.    After study, can you further clarify the acuity of the congestive heart failure as:    Acute diastolic congestive heart failure   Acute on chronic diastolic congestive heart failure   Chronic diastolic congestive heart failure   Other (please specify)__________________  Clinically indeterminable       By submitting this query, we are merely seeking further clarification of documentation to accurately reflect all conditions that you are monitoring, evaluating, treating or that extend the hospitalization or utilize additional resources of care. Please utilize your independent clinical judgment when addressing the question(s) above.     This query and your " response, once completed, will be entered into the legal medical record.    Sincerely,  Devin TRACY RN, CCDS  Clinical Documentation Integrity Program   [email or web address]

## 2021-11-23 NOTE — HOME HEALTH
SUBJECTIVE: Improved adherence to HEP use AAT. No residual pain/injuries from fall last week. Good adherence to HEP. Spouse considering returning to work next week if advisable    EDEMA: none    WOUNDS:  none    SIGNS/SYMPTOMS OF INFECTION: No    FALLS SINCE LAST VISIT: No    PLAN FOR NEXT VISIT:    progress HEP  Review fall risk management training  review stair management  Shower transfer  assess appropriate AD and progression as tolerated.

## 2021-11-23 NOTE — HOME HEALTH
[facility] for cp assessment and t/i on disease management for R pleural infusion, DM, stroke. Patient sitting at kitchen table eating. Wife and daughter present during visit. Nurse reviewed patient medication, wife stated benadryl not a new medication, patient no longer on Lovenox. Wife stated patient has difficulty swallowing and memory deficits,. Nurse called [facility] and spoke with Bethanie hogue for SLP for swallowing and memory deficit. Nurse discussed s/s stroke, i.e F.A.S.T and heart attack , i.e Chest pain, Shortness of breath, Sweating, Nausea.Nurse reinforce patient fluid intake 6-8 glasses water daily. Patient denies any bleeding and stated he is not as fatique. Therapy PT/OT in progess.Patient has no furhter questions and left in good condition

## 2021-11-23 NOTE — OUTREACH NOTE
Medical Week 2 Survey      Responses   [facility] patient discharged from? [place]   Does the patient have one of the following disease processes/diagnoses(primary or secondary)? Other   Week 2 attempt successful? Yes   Call start time 1341   Discharge diagnosis Lower GI bleed Angiodysplasia of colon without hemorrhage Hemiparesis of left nondominant side    Call end time 1343   Person spoke with today (if not patient) and relationship Gladys-spouse and patient   Meds reviewed with patient/caregiver? Yes   Is the patient taking all medications as directed (includes completed medication regime)? Yes   Has the patient kept scheduled appointments due by today? Yes   What is the patient's perception of their health status since discharge? Improving   Week 2 Call Completed? Yes   Wrap up additional comments Improving, no further bleeding.          Erlinda Swanson, RN

## 2021-11-26 ENCOUNTER — ANTICOAGULATION VISIT (OUTPATIENT)
Dept: PHARMACY | Facility: HOSPITAL | Age: 86
End: 2021-11-26

## 2021-11-26 DIAGNOSIS — Z95.2 HX OF AORTIC VALVE REPLACEMENT, MECHANICAL: Primary | ICD-10-CM

## 2021-11-26 DIAGNOSIS — I63.411 CEREBROVASCULAR ACCIDENT (CVA) DUE TO EMBOLISM OF RIGHT MIDDLE CEREBRAL ARTERY (HCC): ICD-10-CM

## 2021-11-26 LAB — INR PPP: 3.5

## 2021-11-26 NOTE — PROGRESS NOTES
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]             Anticoagulation Episode Summary     INR check location:      Preferred lab:      Send INR reminders to:   CHRIS GUTIERREZ  POOL    Comments:  New INR goal 3 - 3.5 (see 1/2020 hospitalization for CVA)      Anticoagulation Care Providers     Provider Role Specialty Phone number    Griffin Fried MD Referring Cardiology [phone]          Clinic Interview:  Patient Findings     Negatives:  Signs/symptoms of thrombosis, Signs/symptoms of bleeding,   Laboratory test error suspected, Change in health, Change in alcohol use,   Change in activity, Upcoming invasive procedure, Emergency department   visit, Upcoming dental procedure, Missed doses, Extra doses, Change in   medications, Change in diet/appetite, Hospital admission, Bruising, Other   complaints      Clinical Outcomes     Negatives:  Major bleeding event, Thromboembolic event,   Anticoagulation-related hospital admission, Anticoagulation-related ED   visit, Anticoagulation-related fatality        INR History:  Anticoagulation Monitoring 11/19/2021 11/22/2021 11/26/2021   INR 3.20 3.20 3.50   INR Date 11/19/2021 11/22/2021 11/26/2021   INR Goal 3.0-3.5 3.0-3.5 3.0-3.5   Trend Same Same Same   Last Week Total 42.5 mg 45 mg 42.5 mg   Next Week Total 42.5 mg 42.5 mg 42.5 mg    Sun 5 mg - 5 mg   Mon - 7.5 mg 7.5 mg   Tue - 5 mg 5 mg   Wed - 7.5 mg 7.5 mg   Thu - 5 mg 5 mg   Fri 7.5 mg - 7.5 mg   Sat 5 mg - 5 mg   Visit Report - - -   Some recent data might be hidden       Plan:  1. INR is Therapeutic today- see above in Anticoagulation Summary.   Will instruct Francisco Sequeira to Continue their warfarin regimen- see above in Anticoagulation Summary.  2. Follow up in 1 week  3. They have been instructed to call if any changes in medications, doses, concerns, etc. Patient expresses understanding and has no further questions at this time.    Judy Rodríguez, PharmD

## 2021-11-29 ENCOUNTER — HOME CARE VISIT (OUTPATIENT)
Dept: HOME HEALTH SERVICES | Facility: HOME HEALTHCARE | Age: 86
End: 2021-11-29

## 2021-11-29 VITALS
HEART RATE: 75 BPM | SYSTOLIC BLOOD PRESSURE: 140 MMHG | DIASTOLIC BLOOD PRESSURE: 66 MMHG | RESPIRATION RATE: 18 BRPM | OXYGEN SATURATION: 98 % | TEMPERATURE: 97.1 F

## 2021-11-29 PROCEDURE — G0300 HHS/HOSPICE OF LPN EA 15 MIN: HCPCS

## 2021-11-29 NOTE — HOME HEALTH
SN[facility] for cp assessment and t/i on disease management for R pleural infusion, DM, stroke. Patient greeted nurse at door with walker. Wife present at visit. Wife stated she will be returning to work soon at she is trying to get patient to be more independent. Patient denies any resp distress. Nurse informed patient and patient's wife that SLP should be eval him this week. Patient has insulin pump and insulin monitor. Discuss s/s hypoglycemia and how to treat. Patient and wife have no further questions and left in good condition.

## 2021-11-30 ENCOUNTER — HOME CARE VISIT (OUTPATIENT)
Dept: HOME HEALTH SERVICES | Facility: HOME HEALTHCARE | Age: 86
End: 2021-11-30

## 2021-11-30 ENCOUNTER — OFFICE VISIT (OUTPATIENT)
Dept: CARDIOLOGY | Facility: CLINIC | Age: 86
End: 2021-11-30

## 2021-11-30 ENCOUNTER — READMISSION MANAGEMENT (OUTPATIENT)
Dept: CALL CENTER | Facility: HOSPITAL | Age: 86
End: 2021-11-30

## 2021-11-30 VITALS
DIASTOLIC BLOOD PRESSURE: 60 MMHG | HEART RATE: 81 BPM | WEIGHT: 156 LBS | BODY MASS INDEX: 21.13 KG/M2 | OXYGEN SATURATION: 98 % | HEIGHT: 72 IN | SYSTOLIC BLOOD PRESSURE: 122 MMHG

## 2021-11-30 VITALS
HEART RATE: 65 BPM | DIASTOLIC BLOOD PRESSURE: 68 MMHG | OXYGEN SATURATION: 98 % | RESPIRATION RATE: 18 BRPM | TEMPERATURE: 97.5 F | SYSTOLIC BLOOD PRESSURE: 136 MMHG

## 2021-11-30 DIAGNOSIS — I48.21 PERMANENT ATRIAL FIBRILLATION (HCC): Primary | ICD-10-CM

## 2021-11-30 DIAGNOSIS — I10 ESSENTIAL HYPERTENSION: ICD-10-CM

## 2021-11-30 DIAGNOSIS — K92.2 GASTROINTESTINAL HEMORRHAGE, UNSPECIFIED GASTROINTESTINAL HEMORRHAGE TYPE: ICD-10-CM

## 2021-11-30 DIAGNOSIS — Z95.2 HX OF AORTIC VALVE REPLACEMENT, MECHANICAL: ICD-10-CM

## 2021-11-30 PROCEDURE — 99214 OFFICE O/P EST MOD 30 MIN: CPT | Performed by: NURSE PRACTITIONER

## 2021-11-30 PROCEDURE — G0151 HHCP-SERV OF PT,EA 15 MIN: HCPCS

## 2021-11-30 NOTE — PROGRESS NOTES
CARDIOLOGY        Patient Name: Francisco Sequeira  :1935  Age: 86 y.o.  Primary Cardiologist: Griffin Fried MD  Encounter Provider:  LIZA Gonzáles    Date of Service: 21          CHIEF COMPLAINT / REASON FOR OFFICE VISIT     Atrial Fibrillation (BHE 2-3 wk f/u)      HISTORY OF PRESENT ILLNESS       HPI  Francisco Sequeira is a 86 y.o. male who presents today for hospital reevaluation.     Pt has a  history significant for cardiac arrest, bicuspid aortic valve, atrial fibrillation, hyperlipidemia, diabetes, ascending aortic aneurysm.    Patient with recent hospitalization -2021.  Patient presented with melena and generalized weakness.  He was found to have a hemoglobin of 7.2 with positive occult blood.  Anticoagulation was held he was given packed red blood cells.  He underwent tagged RBC scan revealing bleeding in the right lower quadrant, distal ileum versus proximal colon.  Once INR was in appropriate range patient had colonoscopy that revealed AVM which was treated.  Patient was followed by cardiology, nephrology, GI, pulmonology.  He was started on a heparin drip without evidence of rebleeding and eventually restarted on warfarin.  Hemoglobin remained stable.  INR was subtherapeutic at discharge and he will continue Lovenox bridge until INR is therapeutic.    Patient has completed :Lovenox injections and is now on Coumadin.  He uses a home INR machine and was 3.4 on last assessment and will be rechecked in 3 days.  He states that he is slowly re-gaining strength and stamina.  Blood counts have not been re-checked since discharge.  He does deny blood in the stool.  He does have home health nursing and PT coming into the house,     The following portions of the patient's history were reviewed and updated as appropriate: allergies, current medications, past family history, past medical history, past social history, past surgical history and problem list.      VITAL SIGNS  "    Visit Vitals  /60 (BP Location: Left arm, Patient Position: Sitting, Cuff Size: Adult)   Pulse 81   Ht 182.9 cm (72\")   Wt 70.8 kg (156 lb)   SpO2 98%   BMI 21.16 kg/m²         Wt Readings from Last 3 Encounters:   11/30/21 70.8 kg (156 lb)   11/19/21 70.8 kg (156 lb)   11/16/21 71 kg (156 lb 9.6 oz)     Body mass index is 21.16 kg/m².      REVIEW OF SYSTEMS   Review of Systems   Constitutional: Positive for malaise/fatigue. Negative for chills, fever, weight gain and weight loss.   Cardiovascular: Positive for dyspnea on exertion. Negative for leg swelling.   Respiratory: Negative for cough, snoring and wheezing.    Hematologic/Lymphatic: Negative for bleeding problem. Does not bruise/bleed easily.   Skin: Negative for color change.   Musculoskeletal: Negative for falls, joint pain and myalgias.   Gastrointestinal: Negative for melena.   Genitourinary: Negative for hematuria.   Neurological: Negative for excessive daytime sleepiness.   Psychiatric/Behavioral: Negative for depression. The patient is not nervous/anxious.            PHYSICAL EXAMINATION     Constitutional:       Appearance: Normal appearance. Well-developed.   Eyes:      Conjunctiva/sclera: Conjunctivae normal.   Neck:      Vascular: No carotid bruit.   Pulmonary:      Effort: Pulmonary effort is normal.      Breath sounds: Normal breath sounds.   Cardiovascular:      Normal rate. Regular rhythm. Normal S1. Normal S2.      Murmurs: There is no murmur.      No gallop. No click. Of mechanical aortic valve No rub.   Musculoskeletal: Normal range of motion. Skin:     General: Skin is warm and dry.   Neurological:      Mental Status: Alert and oriented to person, place, and time.      GCS: GCS eye subscore is 4. GCS verbal subscore is 5. GCS motor subscore is 6.   Psychiatric:         Speech: Speech normal.         Behavior: Behavior normal.         Thought Content: Thought content normal.         Judgment: Judgment normal.           REVIEWED " DATA     Procedures    Cardiac Procedures:  1. Myocardial perfusion stress test 6/1/2017.  Normal myocardial perfusion study without evidence of ischemia.  2. Echocardiogram 1/13/2020.  LVEF 60%.  Mild LVH.  RV cavity is moderately dilated.  Mildly reduced RV systolic function.  PRAVEENA.  LAE.  Mild to moderate tricuspid valve regurgitation.  Calculated RVSP 44.8 mmHg.  3. ABELARDO 1/16/2020.  LVEF 60%.  All LV wall segments contract normally.  LV wall thickness consistent with mild to moderate LVH.  Diastolic function not assessed on this study.  No evidence of LV mass or thrombus.  LAE.  Spontaneous echo contrast present.  Medium degree of spontaneous echo contrast in the left atrium.  Left atrial appendage has been ligated.  PRAVEENA.  Prosthetic aortic valve.  No significant aortic valve regurgitation.  Mild to moderate mitral valve regurgitation.  Tricuspid valve grossly normal.  4. Echocardiogram 10/24/2021.  LVEF 60%.  RV cavity mild to moderately dilated.  LAE.  PRAVEENA.  Mechanical aortic valve present with peak and mean gradients within defined limits.  Moderate tricuspid valve regurgitation.  Severe pulmonary hypertension.    Lipid Panel    Lipid Panel 3/2/21 10/24/21   Total Cholesterol  80   Total Cholesterol 147    Triglycerides 162 (A) 57   HDL Cholesterol 35 (A) 38 (A)   VLDL Cholesterol 28 14   LDL Cholesterol  84 28   LDL/HDL Ratio 2.27 0.81   (A) Abnormal value       Comments are available for some flowsheets but are not being displayed.           BUN   Date Value Ref Range Status   11/16/2021 27 (H) 8 - 23 mg/dL Final     Creatinine   Date Value Ref Range Status   11/16/2021 1.40 (H) 0.76 - 1.27 mg/dL Final     Potassium   Date Value Ref Range Status   11/16/2021 3.6 3.5 - 5.2 mmol/L Final     ALT (SGPT)   Date Value Ref Range Status   11/05/2021 18 1 - 41 U/L Final     AST (SGOT)   Date Value Ref Range Status   11/05/2021 31 1 - 40 U/L Final     Comment:     Slight hemolysis detected by analyzer. Results may be  affected.           ASSESSMENT & PLAN      Diagnosis Plan   1. Permanent atrial fibrillation (HCC)     2. Essential hypertension     3. Hx of aortic valve replacement, mechanical [Z95.2]     4. Gastrointestinal hemorrhage, unspecified gastrointestinal hemorrhage type           SUMMARY/DISCUSSION  1. Permanent A. Fib.  Patient A. fib controlled in the 80s.  Now back on Coumadin.  No bleeding.  Plan to repeat CBC on 12/20.  2. Hypertension.  Blood pressure controlled at 122/60.  Continue furosemide 40 mg in the morning and 20 mg in the evening, metoprolol succinate 25 mg/day.  3. Mechanical AVR.  Click of mechanical valve.  INR 3.5 on last assessment, will recheck in 3 days.  No blood in stool.  4. GI bleed.  Recent GI bleed which was managed by gastroenterology.  5. Keep previously scheduled appointment with Dr. Fried in August 2022.  Please call the office sooner for problems or complications.        MEDICATIONS         Discharge Medications          Accurate as of November 30, 2021 10:28 AM. If you have any questions, ask your nurse or doctor.            Continue These Medications      Instructions Start Date   aspirin 81 MG EC tablet   81 mg, Oral, Daily      atorvastatin 40 MG tablet  Commonly known as: LIPITOR   40 mg, Oral, Daily      digoxin 125 MCG tablet  Commonly known as: LANOXIN   125 mcg, Oral, Daily Digoxin      diphenhydrAMINE 25 mg capsule  Commonly known as: BENADRYL   25 mg, Oral, 2 Times Daily PRN      ferrous gluconate 324 MG tablet  Commonly known as: FERGON   324 mg, Oral, Every Other Day      fish oil 1000 MG capsule capsule   4,000 mg, Oral, Daily With Breakfast      furosemide 20 MG tablet  Commonly known as: LASIX   Take 2 tablets (40 mg) by mouth Every Morning AND 1 tablet(20 mg) Every Evening for 30 days      guaiFENesin 600 MG 12 hr tablet  Commonly known as: MUCINEX   600 mg, Oral, Every 12 Hours      insulin lispro 100 UNIT/ML injection  Commonly known as: humaLOG   USE AS DIRECTED  WITH V-GO PUMP      [employer] Autolet Lancing Device misc   1 each, Does not apply, 3 Times Daily      [employer] Blood Glucose kit   1 each, Does not apply, 3 Times Daily      [employer] Blood Glucose Test test strip  Generic drug: glucose blood   1 each, Other, 3 Times Daily      magnesium oxide 400 MG tablet  Commonly known as: MAG-OX   400 mg, Oral, 2 Times Daily      metoprolol succinate XL 25 MG 24 hr tablet  Commonly known as: TOPROL-XL   25 mg, Oral, Daily      mupirocin 2 % ointment  Commonly known as: BACTROBAN   APPLY TO AFFECTED AREAS THREE TIMES A DAY      pantoprazole 40 MG EC tablet  Commonly known as: PROTONIX   40 mg, Oral, Daily      Pen Needles 32G X 4 MM misc   1 each, Does not apply, Daily PRN, Use with Tresiba       TRESIBA FLEXTOUCH SC   30-65 Units, Subcutaneous, Daily PRN, Use as directed if blood sugar gets too high      V-Go 40 kit   USE AS DIRECTED THREE TIMES A DAY      warfarin 5 MG tablet  Commonly known as: COUMADIN   TAKE 1 AND 1/2 TABLET BY MOUTH DAILY ON MONDAY, WEDNESDAY, AND FRIDAY.  TAKE ONE TABLET BY MOUTH DAILY ON ALL OTHER DAYS OF THE WEEK                 **Dragon Disclaimer:   Much of this encounter note is an electronic transcription/translation of spoken language to printed text. The electronic translation of spoken language may permit erroneous, or at times, nonsensical words or phrases to be inadvertently transcribed. Although I have reviewed the note for such errors, some may still exist.

## 2021-11-30 NOTE — HOME HEALTH
"Subjective: Per spouse:\"He was tired after all the walking he had to do when we went to the cardiologist earlier today. He did great. I am returning to work tomorrow so I have set things up for him to be alone safely.\"    Falls reported: none    Medication changes: none      Plan for next visit: Continue gait training level surfaces and stairs with walker as needed, reinstruct HEP and add balance activities as tolerated, and patient spouse education as needed"

## 2021-11-30 NOTE — OUTREACH NOTE
Medical Week 3 Survey      Responses   [facility] patient discharged from? [place]   Does the patient have one of the following disease processes/diagnoses(primary or secondary)? Other   Week 3 attempt successful? No   Unsuccessful attempts Attempt 1          Gege Walker RN

## 2021-12-02 ENCOUNTER — HOME CARE VISIT (OUTPATIENT)
Dept: HOME HEALTH SERVICES | Facility: HOME HEALTHCARE | Age: 86
End: 2021-12-02

## 2021-12-02 VITALS
RESPIRATION RATE: 18 BRPM | HEART RATE: 72 BPM | DIASTOLIC BLOOD PRESSURE: 80 MMHG | TEMPERATURE: 97.5 F | OXYGEN SATURATION: 100 % | SYSTOLIC BLOOD PRESSURE: 138 MMHG

## 2021-12-02 VITALS
RESPIRATION RATE: 18 BRPM | TEMPERATURE: 97.5 F | OXYGEN SATURATION: 100 % | HEART RATE: 92 BPM | DIASTOLIC BLOOD PRESSURE: 80 MMHG | SYSTOLIC BLOOD PRESSURE: 138 MMHG

## 2021-12-02 PROCEDURE — G0151 HHCP-SERV OF PT,EA 15 MIN: HCPCS

## 2021-12-02 PROCEDURE — G0300 HHS/HOSPICE OF LPN EA 15 MIN: HCPCS

## 2021-12-02 NOTE — HOME HEALTH
SNV for cp assessment and t/i on disease management for R pleural infusion, DM, stroke. Patient sitting in recliner with walker in close proximity. Patient wife has returned back to work. Nurse called wife to contact patient to unlock door while nurse in driveway. Patient has progress well and denies any sob, coughing, or chest pain. Patient has continous glucose monitor and insulin pump. Patient blood glucose levels has been WNL. Patient aware of s/s hypoglycemic and how to treat. Wife manage patient medications in weekly planner. Patient's wife continues to manage protime with thier home machine and call into MD. Patient and wife aware  most commone SE of anticoagulation therapy is bleeding in any tissue organ and to let the nurse or MD know if you experience any s/s of bleeding such as; H/A, dizziness, or weakness. A cut which does not stop bleeding after holding pressure for 5 minutes. Nosebleeds, bleeding gums when brusing teeth. vomiting or coughing up blood. Unusual bruising for unkown reason. Dark brown urine or any s/s blood in urine. Red or black color in stool. Patient continue to c/o memory deficit and difficulties with swallowing food at times. Nurse t/i patient on chin tilt to chest when swallowing to facility food right direction. Patient has no further questions and left in good condition, alone    Plan for next visit: Nursing discipline discahrge. Patient has met all SN goals. PT/ SLP still in progress. Patient made aware

## 2021-12-02 NOTE — HOME HEALTH
"Subjective: \"I did well yesterday by myself as my wife has gone back to work.\"    Falls reported: none    Medication changes: none      Plan for next visit: Continue gait safety training with rolling walker, assess gait with cane, reinstruct HEP for strength and balance activities with further upgrades as tolerated and patient education as needed"

## 2021-12-03 ENCOUNTER — READMISSION MANAGEMENT (OUTPATIENT)
Dept: CALL CENTER | Facility: HOSPITAL | Age: 86
End: 2021-12-03

## 2021-12-03 ENCOUNTER — ANTICOAGULATION VISIT (OUTPATIENT)
Dept: PHARMACY | Facility: HOSPITAL | Age: 86
End: 2021-12-03

## 2021-12-03 DIAGNOSIS — Z95.2 HX OF AORTIC VALVE REPLACEMENT, MECHANICAL: Primary | ICD-10-CM

## 2021-12-03 DIAGNOSIS — I63.411 CEREBROVASCULAR ACCIDENT (CVA) DUE TO EMBOLISM OF RIGHT MIDDLE CEREBRAL ARTERY (HCC): ICD-10-CM

## 2021-12-03 LAB — INR PPP: 3.5

## 2021-12-03 NOTE — OUTREACH NOTE
"Medical Week 3 Survey      Responses   [facility] patient discharged from? [place]   Does the patient have one of the following disease processes/diagnoses(primary or secondary)? Other   Week 3 attempt successful? Yes   Call start time 1411   Call end time 1412   Discharge diagnosis Lower GI bleed Angiodysplasia of colon without hemorrhage Hemiparesis of left nondominant side    Person spoke with today (if not patient) and relationship Gladys-spouse    Meds reviewed with patient/caregiver? Yes   Is the patient taking all medications as directed (includes completed medication regime)? Yes   Has the patient kept scheduled appointments due by today? Yes   What is the patient's perception of their health status since discharge? Improving   Week 3 Call Completed? Yes   Is the patient interested in additional calls from an ambulatory ?  NOTE:  applies to high risk patients requiring additional follow-up. No   Revoked No further contact(revokes)-requires comment   Graduated/Revoked comments Wife reports, \"I think he's doing well\" and that \"his therapist are pleased with his progress.\"          Idalia Murdock RN  "

## 2021-12-03 NOTE — PROGRESS NOTES
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    INR History:  Anticoagulation Monitoring 11/22/2021 11/26/2021 12/3/2021   INR 3.20 3.50 3.50   INR Date 11/22/2021 11/26/2021 12/3/2021   INR Goal 3.0-3.5 3.0-3.5 3.0-3.5   Trend Same Same Same   Last Week Total 45 mg 42.5 mg 42.5 mg   Next Week Total 42.5 mg 42.5 mg 42.5 mg   Sun - 5 mg 5 mg   Mon 7.5 mg 7.5 mg 7.5 mg   Tue 5 mg 5 mg 5 mg   Wed 7.5 mg 7.5 mg 7.5 mg   Thu 5 mg 5 mg 5 mg   Fri - 7.5 mg 7.5 mg   Sat - 5 mg 5 mg   Visit Report - - -   Some recent data might be hidden       Plan:  1. INR is therapeutic today- see above in Anticoagulation Summary.    Francisco Sequeira to continue their warfarin regimen- see above in Anticoagulation Summary.  2. Follow up in 1 weeks  3. Pt has agreed to only be called if INR out of range. They have been instructed to call if any changes in medications, doses, concerns, etc. Patient  expresses understanding and has no further questions at this time.    Princess Robin

## 2021-12-06 ENCOUNTER — HOME CARE VISIT (OUTPATIENT)
Dept: HOME HEALTH SERVICES | Facility: HOME HEALTHCARE | Age: 86
End: 2021-12-06

## 2021-12-06 VITALS
DIASTOLIC BLOOD PRESSURE: 66 MMHG | OXYGEN SATURATION: 98 % | HEART RATE: 75 BPM | TEMPERATURE: 97.5 F | RESPIRATION RATE: 18 BRPM | SYSTOLIC BLOOD PRESSURE: 118 MMHG

## 2021-12-06 VITALS
HEART RATE: 75 BPM | RESPIRATION RATE: 18 BRPM | DIASTOLIC BLOOD PRESSURE: 66 MMHG | OXYGEN SATURATION: 98 % | SYSTOLIC BLOOD PRESSURE: 118 MMHG | TEMPERATURE: 97.5 F

## 2021-12-06 PROCEDURE — G0153 HHCP-SVS OF S/L PATH,EA 15MN: HCPCS

## 2021-12-06 PROCEDURE — G0151 HHCP-SERV OF PT,EA 15 MIN: HCPCS

## 2021-12-06 NOTE — HOME HEALTH
"Subjective: \"I'm doing pretty well with the walker. I still don't feel I have my leg strength back so I will continue using just the walker.\"    Falls reported: none    Medication changes: none      Plan for next visit: discharge assessment and instructions, gait training with rolling walker and possibly assess with cane if patient agreeable, reassess stairs, HEP instruction with further strength/balance activities, and patient spouse education as needed"

## 2021-12-06 NOTE — HOME HEALTH
PLAN FOR NEXT VISIT    MEDICAL NECESSITY FOR ONGOING SKILLED THERAPY:    SPECIFIC INTERVENTIONS AND GOALS TO ADDRESS ON NEXT VISIT:    FREQUENCY AND DURATION:    ANY OTHER FOLLOW UP NEEDED:    REASSESSMENT DUE DATE:

## 2021-12-07 RX ORDER — FUROSEMIDE 40 MG/1
40 TABLET ORAL DAILY
Qty: 90 TABLET | Refills: 1 | Status: SHIPPED | OUTPATIENT
Start: 2021-12-07 | End: 2022-04-20 | Stop reason: HOSPADM

## 2021-12-08 ENCOUNTER — HOME CARE VISIT (OUTPATIENT)
Dept: HOME HEALTH SERVICES | Facility: HOME HEALTHCARE | Age: 86
End: 2021-12-08

## 2021-12-08 VITALS
SYSTOLIC BLOOD PRESSURE: 120 MMHG | RESPIRATION RATE: 20 BRPM | DIASTOLIC BLOOD PRESSURE: 68 MMHG | OXYGEN SATURATION: 94 % | HEART RATE: 76 BPM | TEMPERATURE: 97.9 F

## 2021-12-08 VITALS
DIASTOLIC BLOOD PRESSURE: 66 MMHG | TEMPERATURE: 97.7 F | OXYGEN SATURATION: 99 % | SYSTOLIC BLOOD PRESSURE: 120 MMHG | HEART RATE: 75 BPM | RESPIRATION RATE: 18 BRPM

## 2021-12-08 PROCEDURE — G0299 HHS/HOSPICE OF RN EA 15 MIN: HCPCS

## 2021-12-08 PROCEDURE — G0151 HHCP-SERV OF PT,EA 15 MIN: HCPCS

## 2021-12-10 ENCOUNTER — ANTICOAGULATION VISIT (OUTPATIENT)
Dept: PHARMACY | Facility: HOSPITAL | Age: 86
End: 2021-12-10

## 2021-12-10 DIAGNOSIS — I63.411 CEREBROVASCULAR ACCIDENT (CVA) DUE TO EMBOLISM OF RIGHT MIDDLE CEREBRAL ARTERY (HCC): ICD-10-CM

## 2021-12-10 DIAGNOSIS — Z95.2 HX OF AORTIC VALVE REPLACEMENT, MECHANICAL: Primary | ICD-10-CM

## 2021-12-10 LAB — INR PPP: 3.3

## 2021-12-10 NOTE — PROGRESS NOTES
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    INR History:  Anticoagulation Monitoring 11/26/2021 12/3/2021 12/10/2021   INR 3.50 3.50 3.30   INR Date 11/26/2021 12/3/2021 12/10/2021   INR Goal 3.0-3.5 3.0-3.5 3.0-3.5   Trend Same Same Same   Last Week Total 42.5 mg 42.5 mg 42.5 mg   Next Week Total 42.5 mg 42.5 mg 42.5 mg   Sun 5 mg 5 mg 5 mg   Mon 7.5 mg 7.5 mg 7.5 mg   Tue 5 mg 5 mg 5 mg   Wed 7.5 mg 7.5 mg 7.5 mg   Thu 5 mg 5 mg 5 mg   Fri 7.5 mg 7.5 mg 7.5 mg   Sat 5 mg 5 mg 5 mg   Visit Report - - -   Some recent data might be hidden       Plan:  1. INR is therapeutic today- see above in Anticoagulation Summary.    Francisco Sequeira to continue their warfarin regimen- see above in Anticoagulation Summary.  2. Follow up in 1 week  3. Pt has agreed to only be called if INR out of range. They have been instructed to call if any changes in medications, doses,  concerns, etc. Patient expresses understanding and has no further questions at this time.    Natty Sullivan

## 2021-12-16 ENCOUNTER — HOME CARE VISIT (OUTPATIENT)
Dept: HOME HEALTH SERVICES | Facility: HOME HEALTHCARE | Age: 86
End: 2021-12-16

## 2021-12-16 VITALS
RESPIRATION RATE: 18 BRPM | SYSTOLIC BLOOD PRESSURE: 120 MMHG | DIASTOLIC BLOOD PRESSURE: 60 MMHG | HEART RATE: 60 BPM | OXYGEN SATURATION: 99 % | TEMPERATURE: 97.3 F

## 2021-12-16 PROCEDURE — G0153 HHCP-SVS OF S/L PATH,EA 15MN: HCPCS

## 2021-12-17 ENCOUNTER — ANTICOAGULATION VISIT (OUTPATIENT)
Dept: PHARMACY | Facility: HOSPITAL | Age: 86
End: 2021-12-17

## 2021-12-17 DIAGNOSIS — Z95.2 HX OF AORTIC VALVE REPLACEMENT, MECHANICAL: Primary | ICD-10-CM

## 2021-12-17 DIAGNOSIS — I63.411 CEREBROVASCULAR ACCIDENT (CVA) DUE TO EMBOLISM OF RIGHT MIDDLE CEREBRAL ARTERY (HCC): ICD-10-CM

## 2021-12-17 LAB — INR PPP: 3

## 2021-12-17 NOTE — HOME HEALTH
PLAN FOR NEXT VISIT   MEDICAL NECESSITY FOR ONGOING SKILLED THERAPY: Dysphagia therapy to increase swallowing strength and function for safe intake of least restrictive diet without risk of aspiration/aspiration pneumonia. Skilled Speech Therapy interventions are necessary due to increased risk of aspiration/aspiration pneumonia.  Cognitive linguistic therapy to increase cognition for increased safety, communication and function in home and less dependence on caregivers. Skilled Speech Therapy interventions are necessary due to decline in cognition and increased dependence on caregivers.     SPECIFIC INTERVENTIONS AND GOALS TO ADDRESS ON NEXT VISIT:   Memory tasks  SLP to instruct in swallowing exercises and swallowing precautions  FREQUENCY AND DURATION: 2w4  ANY OTHER FOLLOW UP NEEDED: None  REASSESSMENT DUE DATE: 1/6/22

## 2021-12-17 NOTE — PROGRESS NOTES
Anticoagulation Clinic Progress Note    Anticoagulation Summary  As of 12/17/2021    INR goal:  3.0-3.5   TTR:  67.1 % (3.1 y)   INR used for dosing:  3.00 (12/17/2021)   Warfarin maintenance plan:  7.5 mg every Mon, Wed, Fri; 5 mg all other days   Weekly warfarin total:  42.5 mg   No change documented:  Princess Robin   Plan last modified:  Vasquez Niño, PharmD (12/16/2020)   Next INR check:  12/24/2021   Priority:  High   Target end date:  Indefinite    Indications    Hx of aortic valve replacement  mechanical [Z95.2] [Z95.2]  Atrial fibrillation (HCC) [I48.91]  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    INR History:  Anticoagulation Monitoring 12/3/2021 12/10/2021 12/17/2021   INR 3.50 3.30 3.00   INR Date 12/3/2021 12/10/2021 12/17/2021   INR Goal 3.0-3.5 3.0-3.5 3.0-3.5   Trend Same Same Same   Last Week Total 42.5 mg 42.5 mg 42.5 mg   Next Week Total 42.5 mg 42.5 mg 42.5 mg   Sun 5 mg 5 mg 5 mg   Mon 7.5 mg 7.5 mg 7.5 mg   Tue 5 mg 5 mg 5 mg   Wed 7.5 mg 7.5 mg 7.5 mg   Thu 5 mg 5 mg 5 mg   Fri 7.5 mg 7.5 mg 7.5 mg   Sat 5 mg 5 mg 5 mg   Visit Report - - -   Some recent data might be hidden       Plan:  1. INR is therapeutic today- see above in Anticoagulation Summary.    Francisco Sequeira to continue their warfarin regimen- see above in Anticoagulation Summary.  2. Follow up in 2 weeks  3. Pt has agreed to only be called if INR out of range. They have been instructed to call if any changes in medications, doses, concerns,  etc. Patient expresses understanding and has no further questions at this time.    Princess Robin

## 2021-12-20 ENCOUNTER — OFFICE VISIT (OUTPATIENT)
Dept: FAMILY MEDICINE CLINIC | Facility: CLINIC | Age: 86
End: 2021-12-20

## 2021-12-20 VITALS
TEMPERATURE: 97.4 F | SYSTOLIC BLOOD PRESSURE: 126 MMHG | BODY MASS INDEX: 21.85 KG/M2 | WEIGHT: 161.3 LBS | DIASTOLIC BLOOD PRESSURE: 64 MMHG | HEIGHT: 72 IN | OXYGEN SATURATION: 99 % | HEART RATE: 68 BPM

## 2021-12-20 DIAGNOSIS — D50.0 IRON DEFICIENCY ANEMIA DUE TO CHRONIC BLOOD LOSS: ICD-10-CM

## 2021-12-20 DIAGNOSIS — J90 PLEURAL EFFUSION: ICD-10-CM

## 2021-12-20 DIAGNOSIS — IMO0002 UNCONTROLLED TYPE 2 DIABETES MELLITUS WITH COMPLICATION, WITH LONG-TERM CURRENT USE OF INSULIN: Primary | ICD-10-CM

## 2021-12-20 LAB
EXPIRATION DATE: ABNORMAL
HBA1C MFR BLD: 5.4 %
Lab: ABNORMAL

## 2021-12-20 PROCEDURE — 3044F HG A1C LEVEL LT 7.0%: CPT | Performed by: NURSE PRACTITIONER

## 2021-12-20 PROCEDURE — 71046 X-RAY EXAM CHEST 2 VIEWS: CPT | Performed by: NURSE PRACTITIONER

## 2021-12-20 PROCEDURE — 83036 HEMOGLOBIN GLYCOSYLATED A1C: CPT | Performed by: NURSE PRACTITIONER

## 2021-12-20 PROCEDURE — 99214 OFFICE O/P EST MOD 30 MIN: CPT | Performed by: NURSE PRACTITIONER

## 2021-12-20 NOTE — PROGRESS NOTES
"Chief Complaint  Diabetes (Patient is needing his a1c checked here for 6 month f/u ) and fluid on lungs (Patient was told to rto in 4 weeks for chest xray, cbc, and iron panel )    Subjective          Francisco Sequeira presents to [facility]  History of Present Illness  Patient was seen in office on 11/19/2021 for a hospital follow-up for a lower GI bleed, pleural effusion and stage III chronic kidney disease x-ray on 11/11 showed a right pleural effusion so you can repeat the x-ray today.  CBC on 11/16/2021 had a hemoglobin of 9.8 we will repeat that today as well.    Diabetes-today's A1c is 5.2 he had some issues with his sugar dropping too low so they have actually decreased the amount of insulin that he is using in half and since then he is not been having any lows.  I believe this is probably from the amount of weight that he is lost since being in the hospital.    Patient states that he feels fine.  He has been taking his iron as directed but he is having some constipation for that so we discussed adding MiraLAX.      Objective   Vital Signs:   /64   Pulse 68   Temp 97.4 °F (36.3 °C)   Ht 182.9 cm (72.01\")   Wt 73.2 kg (161 lb 4.8 oz)   SpO2 99%   BMI 21.87 kg/m²     Physical Exam  Vitals reviewed.   Constitutional:       General: He is not in acute distress.     Appearance: He is well-developed.   HENT:      Head: Normocephalic.   Cardiovascular:      Rate and Rhythm: Normal rate and regular rhythm.      Heart sounds: Normal heart sounds.   Pulmonary:      Effort: Pulmonary effort is normal.      Breath sounds: Normal breath sounds.   Neurological:      Mental Status: He is alert and oriented to person, place, and time.      Gait: Gait normal.   Psychiatric:         Behavior: Behavior normal.         Thought Content: Thought content normal.         Judgment: Judgment normal.        Result Review :   The following data was reviewed by: LIZA Trinh on " 12/20/2021:  CMP    CMP 11/14/21 11/15/21 11/16/21   Glucose 156 (A) 111 (A) 138 (A)   BUN 22 27 (A) 27 (A)   Creatinine 1.21 1.48 (A) 1.40 (A)   eGFR Non African Am 57 (A) 45 (A) 48 (A)   Sodium 138 142 139   Potassium 3.7 3.7 3.6   Chloride 102 104 103   Calcium 8.3 (A) 8.1 (A) 8.6   Albumin 3.00 (A) 2.70 (A) 2.70 (A)   (A) Abnormal value            CBC w/diff    CBC w/Diff 11/14/21 11/15/21 11/16/21   WBC 5.42 5.33 4.79   RBC 3.95 (A) 3.54 (A) 3.85 (A)   Hemoglobin 10.1 (A) 9.0 (A) 9.8 (A)   Hematocrit 32.3 (A) 28.7 (A) 30.9 (A)   MCV 81.8 81.1 80.3   MCH 25.6 (A) 25.4 (A) 25.5 (A)   MCHC 31.3 (A) 31.4 (A) 31.7   RDW 19.9 (A) 20.3 (A) 20.0 (A)   Platelets 258 214 235   Neutrophil Rel % 70.1 66.7 65.5   Immature Granulocyte Rel % 0.7 (A) 0.4 0.4   Lymphocyte Rel % 15.9 (A) 18.6 (A) 19.8   Monocyte Rel % 6.5 7.9 8.1   Eosinophil Rel % 6.1 5.6 5.2   Basophil Rel % 0.7 0.8 1.0   (A) Abnormal value            Lipid Panel    Lipid Panel 3/2/21 10/24/21   Total Cholesterol  80   Total Cholesterol 147    Triglycerides 162 (A) 57   HDL Cholesterol 35 (A) 38 (A)   VLDL Cholesterol 28 14   LDL Cholesterol  84 28   LDL/HDL Ratio 2.27 0.81   (A) Abnormal value       Comments are available for some flowsheets but are not being displayed.               Renal Profile    Renal Profile 11/14/21 11/15/21 11/16/21   BUN 22 27 (A) 27 (A)   Creatinine 1.21 1.48 (A) 1.40 (A)   eGFR Non African Am 57 (A) 45 (A) 48 (A)   (A) Abnormal value            BMP    BMP 11/14/21 11/15/21 11/16/21   BUN 22 27 (A) 27 (A)   Creatinine 1.21 1.48 (A) 1.40 (A)   Sodium 138 142 139   Potassium 3.7 3.7 3.6   Chloride 102 104 103   CO2 24.9 28.2 26.3   Calcium 8.3 (A) 8.1 (A) 8.6   (A) Abnormal value            Most Recent A1C    HGBA1C Most Recent 12/20/21   Hemoglobin A1C 5.4           PSA    PSA 3/2/21   PSA 0.105                    Xray- chest     Findings- pleural effusion still present but 50% better  Compared to 11/16/21    Assessment and Plan     Diagnoses and all orders for this visit:    1. Uncontrolled type 2 diabetes mellitus with complication, with long-term current use of insulin (HCC) (Primary)  -     POC Glycosylated Hemoglobin (Hb A1C)    2. Pleural effusion  -     XR Chest PA & Lateral (In Office)    3. Iron deficiency anemia due to chronic blood loss  -     CBC & Differential  -     Iron Profile        Follow Up   Return in about 3 months (around 3/20/2022) for Recheck.  Patient was given instructions and counseling regarding his condition or for health maintenance advice. Please see specific information pulled into the AVS if appropriate.     Continue same with cutting the insulin in half and watching blood sugar levels call if needed.  Follow-up in 3 months for A1c.  Follow-up after CBC.  His checks x-ray has improved at least 50% we will repeat that in 3 months or if he gets in trouble with shortness of breath he is to return to the office sooner    Mask and googles worn

## 2021-12-21 ENCOUNTER — HOME CARE VISIT (OUTPATIENT)
Dept: HOME HEALTH SERVICES | Facility: HOME HEALTHCARE | Age: 86
End: 2021-12-21

## 2021-12-21 VITALS
DIASTOLIC BLOOD PRESSURE: 62 MMHG | SYSTOLIC BLOOD PRESSURE: 122 MMHG | HEART RATE: 85 BPM | RESPIRATION RATE: 18 BRPM | TEMPERATURE: 97.7 F | OXYGEN SATURATION: 100 %

## 2021-12-21 DIAGNOSIS — J90 PLEURAL EFFUSION: Primary | ICD-10-CM

## 2021-12-21 LAB
BASOPHILS # BLD AUTO: 0.1 X10E3/UL (ref 0–0.2)
BASOPHILS NFR BLD AUTO: 1 %
EOSINOPHIL # BLD AUTO: 0.4 X10E3/UL (ref 0–0.4)
EOSINOPHIL NFR BLD AUTO: 6 %
ERYTHROCYTE [DISTWIDTH] IN BLOOD BY AUTOMATED COUNT: 17.4 % (ref 11.6–15.4)
HCT VFR BLD AUTO: 38.6 % (ref 37.5–51)
HGB BLD-MCNC: 11.8 G/DL (ref 13–17.7)
IMM GRANULOCYTES # BLD AUTO: 0 X10E3/UL (ref 0–0.1)
IMM GRANULOCYTES NFR BLD AUTO: 0 %
IRON SATN MFR SERPL: 22 % (ref 15–55)
IRON SERPL-MCNC: 57 UG/DL (ref 38–169)
LYMPHOCYTES # BLD AUTO: 1.2 X10E3/UL (ref 0.7–3.1)
LYMPHOCYTES NFR BLD AUTO: 21 %
MCH RBC QN AUTO: 25.8 PG (ref 26.6–33)
MCHC RBC AUTO-ENTMCNC: 30.6 G/DL (ref 31.5–35.7)
MCV RBC AUTO: 84 FL (ref 79–97)
MONOCYTES # BLD AUTO: 0.4 X10E3/UL (ref 0.1–0.9)
MONOCYTES NFR BLD AUTO: 8 %
NEUTROPHILS # BLD AUTO: 3.6 X10E3/UL (ref 1.4–7)
NEUTROPHILS NFR BLD AUTO: 64 %
PLATELET # BLD AUTO: 177 X10E3/UL (ref 150–450)
RBC # BLD AUTO: 4.58 X10E6/UL (ref 4.14–5.8)
TIBC SERPL-MCNC: 256 UG/DL (ref 250–450)
UIBC SERPL-MCNC: 199 UG/DL (ref 111–343)
WBC # BLD AUTO: 5.6 X10E3/UL (ref 3.4–10.8)

## 2021-12-21 PROCEDURE — G0153 HHCP-SVS OF S/L PATH,EA 15MN: HCPCS

## 2021-12-22 NOTE — HOME HEALTH
PLAN FOR NEXT VISIT   MEDICAL NECESSITY FOR ONGOING SKILLED THERAPY: Dysphagia therapy to increase swallowing strength and function for safe intake of least restrictive diet without risk of aspiration/aspiration pneumonia. Skilled Speech Therapy interventions are necessary due to increased risk of aspiration/aspiration pneumonia.   Cognitive linguistic therapy to increase cognition for increased safety, communication and function in home and less dependence on caregivers. Skilled Speech Therapy interventions are necessary due to decline in cognition and increased dependence on caregivers.   SPECIFIC INTERVENTIONS AND GOALS TO ADDRESS ON NEXT VISIT:   Memory tasks/ Compensatory memory tasks  SLP to instruct in swallowing exercises and swallowing precautions   FREQUENCY AND DURATION: 2w4   ANY OTHER FOLLOW UP NEEDED: None   REASSESSMENT DUE DATE: 1/6/22

## 2021-12-23 ENCOUNTER — HOME CARE VISIT (OUTPATIENT)
Dept: HOME HEALTH SERVICES | Facility: HOME HEALTHCARE | Age: 86
End: 2021-12-23

## 2021-12-23 LAB
MYCOBACTERIUM SPEC CULT: NORMAL
NIGHT BLUE STAIN TISS: NORMAL

## 2021-12-23 PROCEDURE — G0153 HHCP-SVS OF S/L PATH,EA 15MN: HCPCS

## 2021-12-23 RX ORDER — ASPIRIN 81 MG/1
81 TABLET ORAL DAILY
Qty: 90 TABLET | Refills: 0 | Status: SHIPPED | OUTPATIENT
Start: 2021-12-23 | End: 2022-04-20 | Stop reason: HOSPADM

## 2021-12-24 VITALS
RESPIRATION RATE: 18 BRPM | TEMPERATURE: 98 F | SYSTOLIC BLOOD PRESSURE: 120 MMHG | HEART RATE: 83 BPM | OXYGEN SATURATION: 100 % | DIASTOLIC BLOOD PRESSURE: 60 MMHG

## 2021-12-24 LAB — INR PPP: 3.4

## 2021-12-27 ENCOUNTER — ANTICOAGULATION VISIT (OUTPATIENT)
Dept: PHARMACY | Facility: HOSPITAL | Age: 86
End: 2021-12-27

## 2021-12-27 DIAGNOSIS — I63.411 CEREBROVASCULAR ACCIDENT (CVA) DUE TO EMBOLISM OF RIGHT MIDDLE CEREBRAL ARTERY (HCC): ICD-10-CM

## 2021-12-27 DIAGNOSIS — Z95.2 HX OF AORTIC VALVE REPLACEMENT, MECHANICAL: Primary | ICD-10-CM

## 2021-12-27 RX ORDER — DIGOXIN 125 MCG
TABLET ORAL
Qty: 90 TABLET | Refills: 1 | Status: SHIPPED | OUTPATIENT
Start: 2021-12-27 | End: 2022-04-20 | Stop reason: HOSPADM

## 2021-12-27 NOTE — PROGRESS NOTES
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    INR History:  Anticoagulation Monitoring 12/10/2021 12/17/2021 12/27/2021   INR 3.30 3.00 3.40   INR Date 12/10/2021 12/17/2021 12/24/2021   INR Goal 3.0-3.5 3.0-3.5 3.0-3.5   Trend Same Same Same   Last Week Total 42.5 mg 42.5 mg 42.5 mg   Next Week Total 42.5 mg 42.5 mg 42.5 mg   Sun 5 mg 5 mg -   Mon 7.5 mg 7.5 mg 7.5 mg   Tue 5 mg 5 mg 5 mg   Wed 7.5 mg 7.5 mg 7.5 mg   Thu 5 mg 5 mg 5 mg   Fri 7.5 mg 7.5 mg -   Sat 5 mg 5 mg -   Visit Report - - -   Some recent data might be hidden       Plan:  1. INR is therapeutic today- see above in Anticoagulation Summary.    Francisco Sequeira to continue their warfarin regimen- see above in Anticoagulation Summary.  2. Follow up in 1 week  3. Pt has agreed to only be called if INR out of range. They have been instructed to call if any changes in medications, doses, concerns, etc.  Patient expresses understanding and has no further questions at this time.    Natty Sullivan

## 2021-12-31 LAB — INR PPP: 3.5

## 2022-01-03 ENCOUNTER — ANTICOAGULATION VISIT (OUTPATIENT)
Dept: PHARMACY | Facility: HOSPITAL | Age: 87
End: 2022-01-03

## 2022-01-03 DIAGNOSIS — I63.411 CEREBROVASCULAR ACCIDENT (CVA) DUE TO EMBOLISM OF RIGHT MIDDLE CEREBRAL ARTERY: ICD-10-CM

## 2022-01-03 DIAGNOSIS — Z95.2 HX OF AORTIC VALVE REPLACEMENT, MECHANICAL: Primary | ICD-10-CM

## 2022-01-03 NOTE — PROGRESS NOTES
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    INR History:  Anticoagulation Monitoring 12/17/2021 12/27/2021 1/3/2022   INR 3.00 3.40 3.50   INR Date 12/17/2021 12/24/2021 12/31/2021   INR Goal 3.0-3.5 3.0-3.5 3.0-3.5   Trend Same Same Same   Last Week Total 42.5 mg 42.5 mg 42.5 mg   Next Week Total 42.5 mg 42.5 mg 42.5 mg   Sun 5 mg - -   Mon 7.5 mg 7.5 mg 7.5 mg   Tue 5 mg 5 mg 5 mg   Wed 7.5 mg 7.5 mg 7.5 mg   Thu 5 mg 5 mg 5 mg   Fri 7.5 mg - -   Sat 5 mg - -   Visit Report - - -   Some recent data might be hidden       Plan:  1. INR is therapeutic today- see above in Anticoagulation Summary.    Francisco Sequeira to continue their warfarin regimen- see above in Anticoagulation Summary.  2. Follow up in 1 week  3. Pt has agreed to only be called if INR out of range. They have been instructed to call if any changes in medications, doses, concerns, etc. Patient expresses  understanding and has no further questions at this time.    Princess Robin

## 2022-01-07 ENCOUNTER — ANTICOAGULATION VISIT (OUTPATIENT)
Dept: PHARMACY | Facility: HOSPITAL | Age: 87
End: 2022-01-07

## 2022-01-07 DIAGNOSIS — I63.411 CEREBROVASCULAR ACCIDENT (CVA) DUE TO EMBOLISM OF RIGHT MIDDLE CEREBRAL ARTERY: ICD-10-CM

## 2022-01-07 DIAGNOSIS — Z95.2 HX OF AORTIC VALVE REPLACEMENT, MECHANICAL: Primary | ICD-10-CM

## 2022-01-07 LAB — INR PPP: 3.2

## 2022-01-07 NOTE — PROGRESS NOTES
Anticoagulation Clinic Progress Note    Anticoagulation Summary  As of 1/7/2022    INR goal:  3.0-3.5   TTR:  67.7 % (3.2 y)   INR used for dosing:  3.20 (1/7/2022)   Warfarin maintenance plan:  7.5 mg every Mon, Wed, Fri; 5 mg all other days   Weekly warfarin total:  42.5 mg   No change documented:  Princess Robin   Plan last modified:  Vasuqez Niño, PharmD (12/16/2020)   Next INR check:  1/14/2022   Priority:  High   Target end date:  Indefinite    Indications    Hx of aortic valve replacement  mechanical [Z95.2] [Z95.2]  Atrial fibrillation (HCC) [I48.91]  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]             Anticoagulation Episode Summary     INR check location:      Preferred lab:      Send INR reminders to:   [facility] GUT[facility]RE[facility]  POOL    Comments:  New INR goal 3 - 3.5 (see 1/2020 hospitalization for CVA)      Anticoagulation Care Providers     Provider Role Specialty Phone number    Griffin Fried MD Referring Cardiology [phone]          Clinic Interview:  No pertinent clinical findings have been reported.    INR History:  Anticoagulation Monitoring 12/27/2021 1/3/2022 1/7/2022   INR 3.40 3.50 3.20   INR Date 12/24/2021 12/31/2021 1/7/2022   INR Goal 3.0-3.5 3.0-3.5 3.0-3.5   Trend Same Same Same   Last Week Total 42.5 mg 42.5 mg 42.5 mg   Next Week Total 42.5 mg 42.5 mg 42.5 mg   Sun - - 5 mg   Mon 7.5 mg 7.5 mg 7.5 mg   Tue 5 mg 5 mg 5 mg   Wed 7.5 mg 7.5 mg 7.5 mg   Thu 5 mg 5 mg 5 mg   Fri - - 7.5 mg   Sat - - 5 mg   Visit Report - - -   Some recent data might be hidden       Plan:  1. INR is therapeutic today- see above in Anticoagulation Summary.    Francisco Sequeira to continue their warfarin regimen- see above in Anticoagulation Summary.  2. Follow up in 1 weeks  3. Pt has agreed to only be called if INR out of range. They have been instructed to call if any changes in medications, doses, concerns, etc. Patient expresses  understanding and has no further questions at this time.    Princess Robin

## 2022-01-14 ENCOUNTER — ANTICOAGULATION VISIT (OUTPATIENT)
Dept: PHARMACY | Facility: HOSPITAL | Age: 87
End: 2022-01-14

## 2022-01-14 DIAGNOSIS — Z95.2 HX OF AORTIC VALVE REPLACEMENT, MECHANICAL: Primary | ICD-10-CM

## 2022-01-14 DIAGNOSIS — I63.411 CEREBROVASCULAR ACCIDENT (CVA) DUE TO EMBOLISM OF RIGHT MIDDLE CEREBRAL ARTERY: ICD-10-CM

## 2022-01-14 LAB — INR PPP: 3.3

## 2022-01-14 NOTE — PROGRESS NOTES
Anticoagulation Clinic Progress Note    Anticoagulation Summary  As of 1/14/2022    INR goal:  3.0-3.5   TTR:  67.9 % (3.2 y)   INR used for dosing:  3.30 (1/14/2022)   Warfarin maintenance plan:  7.5 mg every Mon, Wed, Fri; 5 mg all other days   Weekly warfarin total:  42.5 mg   No change documented:  Natty Sullivan   Plan last modified:  Vasquez Niño, PharmD (12/16/2020)   Next INR check:  1/21/2022   Priority:  High   Target end date:  Indefinite    Indications    Hx of aortic valve replacement  mechanical [Z95.2] [Z95.2]  Atrial fibrillation (HCC) [I48.91]  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]             Anticoagulation Episode Summary     INR check location:      Preferred lab:      Send INR reminders to:   [facility] GUT[facility]REZ  POOL    Comments:  New INR goal 3 - 3.5 (see 1/2020 hospitalization for CVA)      Anticoagulation Care Providers     Provider Role Specialty Phone number    Griffin Fried MD Referring Cardiology [phone]          Clinic Interview:  No pertinent clinical findings have been reported.    INR History:  Anticoagulation Monitoring 1/3/2022 1/7/2022 1/14/2022   INR 3.50 3.20 3.30   INR Date 12/31/2021 1/7/2022 1/14/2022   INR Goal 3.0-3.5 3.0-3.5 3.0-3.5   Trend Same Same Same   Last Week Total 42.5 mg 42.5 mg 42.5 mg   Next Week Total 42.5 mg 42.5 mg 42.5 mg   Sun - 5 mg 5 mg   Mon 7.5 mg 7.5 mg 7.5 mg   Tue 5 mg 5 mg 5 mg   Wed 7.5 mg 7.5 mg 7.5 mg   Thu 5 mg 5 mg 5 mg   Fri - 7.5 mg 7.5 mg   Sat - 5 mg 5 mg   Visit Report - - -   Some recent data might be hidden       Plan:  1. INR is therapeutic today- see above in Anticoagulation Summary.    Francisco Sequeira to continue their warfarin regimen- see above in Anticoagulation Summary.  2. Follow up in 1 week  3. Pt has agreed to only be called if INR out of range. They have been instructed to call if any changes in medications, doses, concerns, etc. Patient  expresses understanding and has no further questions at this time.    Natty Sullivan

## 2022-01-21 ENCOUNTER — ANTICOAGULATION VISIT (OUTPATIENT)
Dept: PHARMACY | Facility: HOSPITAL | Age: 87
End: 2022-01-21

## 2022-01-21 DIAGNOSIS — Z95.2 HX OF AORTIC VALVE REPLACEMENT, MECHANICAL: Primary | ICD-10-CM

## 2022-01-21 DIAGNOSIS — I63.411 CEREBROVASCULAR ACCIDENT (CVA) DUE TO EMBOLISM OF RIGHT MIDDLE CEREBRAL ARTERY: ICD-10-CM

## 2022-01-21 LAB — INR PPP: 3.3

## 2022-01-21 NOTE — PROGRESS NOTES
Anticoagulation Clinic Progress Note    Anticoagulation Summary  As of 1/21/2022    INR goal:  3.0-3.5   TTR:  68.1 % (3.2 y)   INR used for dosing:  3.30 (1/21/2022)   Warfarin maintenance plan:  7.5 mg every Mon, Wed, Fri; 5 mg all other days   Weekly warfarin total:  42.5 mg   No change documented:  Princess Robin   Plan last modified:  Vasquez Niño, PharmD (12/16/2020)   Next INR check:  1/28/2022   Priority:  High   Target end date:  Indefinite    Indications    Hx of aortic valve replacement  mechanical [Z95.2] [Z95.2]  Atrial fibrillation (HCC) [I48.91]  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]             Anticoagulation Episode Summary     INR check location:      Preferred lab:      Send INR reminders to:   [facility]  POOL    Comments:  New INR goal 3 - 3.5 (see 1/2020 hospitalization for CVA)      Anticoagulation Care Providers     Provider Role Specialty Phone number    Griffin Fried MD Referring Cardiology [phone]          Clinic Interview:  No pertinent clinical findings have been reported.    INR History:  Anticoagulation Monitoring 1/7/2022 1/14/2022 1/21/2022   INR 3.20 3.30 3.30   INR Date 1/7/2022 1/14/2022 1/21/2022   INR Goal 3.0-3.5 3.0-3.5 3.0-3.5   Trend Same Same Same   Last Week Total 42.5 mg 42.5 mg 42.5 mg   Next Week Total 42.5 mg 42.5 mg 42.5 mg   Sun 5 mg 5 mg 5 mg   Mon 7.5 mg 7.5 mg 7.5 mg   Tue 5 mg 5 mg 5 mg   Wed 7.5 mg 7.5 mg 7.5 mg   Thu 5 mg 5 mg 5 mg   Fri 7.5 mg 7.5 mg 7.5 mg   Sat 5 mg 5 mg 5 mg   Visit Report - - -   Some recent data might be hidden       Plan:  1. INR is therapeutic today- see above in Anticoagulation Summary.    Francisco Sequeira to continue their warfarin regimen- see above in Anticoagulation Summary.  2. Follow up in 2 weeks  3. Pt has agreed to only be called if INR out of range. They have been instructed to call if any changes in medications, doses, concerns, etc. Patient  expresses understanding and has no further questions at this time.    Princess Robin

## 2022-01-25 ENCOUNTER — TELEPHONE (OUTPATIENT)
Dept: FAMILY MEDICINE CLINIC | Facility: CLINIC | Age: 87
End: 2022-01-25

## 2022-01-25 NOTE — TELEPHONE ENCOUNTER
"    Caller: Francisco Sequeira    Relationship: Self    Best call back number: [phone]    What is the best time to reach you: ANY    Who are you requesting to speak with (clinical staff, provider,  specific staff member): CLINICAL    What was the call regarding: PATIENT HAS BEEN HAVING THE URGE TO URINATE FOR THE PAST WEEK AND SAYS THAT IT IS \"RUNNY\", CANT MAKE IT TO THE BATHROOM EVEN IF ITS CLOSE.     PATIENT DID NOT WANT TO MAKE AN APPOINTMENT. WANTING TO [facility] TO GIVE HIM A CALL. REQUESTED A CALL ASAP, STATED HE WOULD CONTINUE TO CALL TO CHECK IN.     Do you require a callback: YES            "

## 2022-01-28 ENCOUNTER — ANTICOAGULATION VISIT (OUTPATIENT)
Dept: PHARMACY | Facility: HOSPITAL | Age: 87
End: 2022-01-28

## 2022-01-28 ENCOUNTER — TELEPHONE (OUTPATIENT)
Dept: FAMILY MEDICINE CLINIC | Facility: CLINIC | Age: 87
End: 2022-01-28

## 2022-01-28 DIAGNOSIS — I63.411 CEREBROVASCULAR ACCIDENT (CVA) DUE TO EMBOLISM OF RIGHT MIDDLE CEREBRAL ARTERY: ICD-10-CM

## 2022-01-28 DIAGNOSIS — Z95.2 HX OF AORTIC VALVE REPLACEMENT, MECHANICAL: Primary | ICD-10-CM

## 2022-01-28 LAB — INR PPP: 3

## 2022-01-28 NOTE — TELEPHONE ENCOUNTER
Caller: Gladys Sequeira    Relationship: Emergency Contact    Best call back number: [phone] (H)    What is the best time to reach you: ANYTIME    Who are you requesting to speak with (clinical staff, provider,  specific staff member): CLINICAL STAFF    Do you know the name of the person who called:     What was the call regarding: PATIENT'S WIFE CALLED TO ADVISE THAT PATIENT HAS HAD DIARRHEA FOR OVER A WEEK NOW, AND SHE IS WANTING A CALLBACK TODAY TO DISCUSS THIS MATTER. PATIENT HAS BEEN TAKING IMODIUM SINCE Tuesday AND PATIENT STILL HAVING DIARRHEA.     Do you require a callback: YES        THANKS

## 2022-01-28 NOTE — PROGRESS NOTES
Anticoagulation Clinic Progress Note    Anticoagulation Summary  As of 1/28/2022    INR goal:  3.0-3.5   TTR:  68.3 % (3.2 y)   INR used for dosing:  3.00 (1/28/2022)   Warfarin maintenance plan:  7.5 mg every Mon, Wed, Fri; 5 mg all other days   Weekly warfarin total:  42.5 mg   No change documented:  Princess Robin   Plan last modified:  Vasquez Niño, PharmD (12/16/2020)   Next INR check:  2/4/2022   Priority:  High   Target end date:  Indefinite    Indications    Hx of aortic valve replacement  mechanical [Z95.2] [Z95.2]  Atrial fibrillation (HCC) [I48.91]  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]             Anticoagulation Episode Summary     INR check location:      Preferred lab:      Send INR reminders to:   [facility]  POOL    Comments:  New INR goal 3 - 3.5 (see 1/2020 hospitalization for CVA)      Anticoagulation Care Providers     Provider Role Specialty Phone number    Griffin Fried MD Referring Cardiology [phone]          Clinic Interview:  No pertinent clinical findings have been reported.    INR History:  Anticoagulation Monitoring 1/14/2022 1/21/2022 1/28/2022   INR 3.30 3.30 3.00   INR Date 1/14/2022 1/21/2022 1/28/2022   INR Goal 3.0-3.5 3.0-3.5 3.0-3.5   Trend Same Same Same   Last Week Total 42.5 mg 42.5 mg 42.5 mg   Next Week Total 42.5 mg 42.5 mg 42.5 mg   Sun 5 mg 5 mg 5 mg   Mon 7.5 mg 7.5 mg 7.5 mg   Tue 5 mg 5 mg 5 mg   Wed 7.5 mg 7.5 mg 7.5 mg   Thu 5 mg 5 mg 5 mg   Fri 7.5 mg 7.5 mg 7.5 mg   Sat 5 mg 5 mg 5 mg   Visit Report - - -   Some recent data might be hidden       Plan:  1. INR is therapeutic today- see above in Anticoagulation Summary.    Francisco Sequeira to continue their warfarin regimen- see above in Anticoagulation Summary.  2. Follow up in 1 weeks  3. Pt has agreed to only be called if INR out of range. They have been instructed to call if any changes in medications, doses, concerns, etc.  Patient expresses understanding and has no further questions at this time.    Princess Robin

## 2022-01-28 NOTE — TELEPHONE ENCOUNTER
Please directed him to go to [facility] for this as I do not have any openings today.  He needs to be seen bc the med is not helping

## 2022-01-31 ENCOUNTER — HOSPITAL ENCOUNTER (EMERGENCY)
Facility: HOSPITAL | Age: 87
Discharge: HOME | End: 2022-01-31
Attending: EMERGENCY MEDICINE | Admitting: EMERGENCY MEDICINE

## 2022-01-31 VITALS
BODY MASS INDEX: 20.32 KG/M2 | SYSTOLIC BLOOD PRESSURE: 117 MMHG | HEART RATE: 66 BPM | RESPIRATION RATE: 18 BRPM | DIASTOLIC BLOOD PRESSURE: 62 MMHG | WEIGHT: 150 LBS | OXYGEN SATURATION: 97 % | TEMPERATURE: 97.3 F | HEIGHT: 72 IN

## 2022-01-31 DIAGNOSIS — N18.9 CHRONIC RENAL FAILURE, UNSPECIFIED CKD STAGE: ICD-10-CM

## 2022-01-31 DIAGNOSIS — R19.7 DIARRHEA, UNSPECIFIED TYPE: Primary | ICD-10-CM

## 2022-01-31 DIAGNOSIS — Z79.01 ANTICOAGULATED BY ANTICOAGULATION TREATMENT: ICD-10-CM

## 2022-01-31 LAB
ADV 40+41 DNA STL QL NAA+NON-PROBE: NOT DETECTED
ALBUMIN SERPL-MCNC: 2.7 G/DL (ref 3.5–5.2)
ALBUMIN/GLOB SERPL: 0.9 G/DL
ALP SERPL-CCNC: 86 U/L (ref 39–117)
ALT SERPL W P-5'-P-CCNC: 12 U/L (ref 1–41)
ANION GAP SERPL CALCULATED.3IONS-SCNC: 15.3 MMOL/L (ref 5–15)
AST SERPL-CCNC: 14 U/L (ref 1–40)
ASTRO TYP 1-8 RNA STL QL NAA+NON-PROBE: NOT DETECTED
BASOPHILS # BLD AUTO: 0.02 10*3/MM3 (ref 0–0.2)
BASOPHILS NFR BLD AUTO: 0.3 % (ref 0–1.5)
BILIRUB SERPL-MCNC: 0.3 MG/DL (ref 0–1.2)
BUN SERPL-MCNC: 36 MG/DL (ref 8–23)
BUN/CREAT SERPL: 16.9 (ref 7–25)
C CAYETANENSIS DNA STL QL NAA+NON-PROBE: NOT DETECTED
C COLI+JEJ+UPSA DNA STL QL NAA+NON-PROBE: NOT DETECTED
CALCIUM SPEC-SCNC: 8.3 MG/DL (ref 8.6–10.5)
CHLORIDE SERPL-SCNC: 105 MMOL/L (ref 98–107)
CO2 SERPL-SCNC: 20.7 MMOL/L (ref 22–29)
CREAT SERPL-MCNC: 2.13 MG/DL (ref 0.76–1.27)
CRYPTOSP DNA STL QL NAA+NON-PROBE: NOT DETECTED
DEPRECATED RDW RBC AUTO: 44.6 FL (ref 37–54)
E HISTOLYT DNA STL QL NAA+NON-PROBE: NOT DETECTED
EAEC PAA PLAS AGGR+AATA ST NAA+NON-PRB: NOT DETECTED
EC STX1+STX2 GENES STL QL NAA+NON-PROBE: NOT DETECTED
EOSINOPHIL # BLD AUTO: 0.04 10*3/MM3 (ref 0–0.4)
EOSINOPHIL NFR BLD AUTO: 0.5 % (ref 0.3–6.2)
EPEC EAE GENE STL QL NAA+NON-PROBE: NOT DETECTED
ERYTHROCYTE [DISTWIDTH] IN BLOOD BY AUTOMATED COUNT: 15 % (ref 12.3–15.4)
ETEC LTA+ST1A+ST1B TOX ST NAA+NON-PROBE: NOT DETECTED
G LAMBLIA DNA STL QL NAA+NON-PROBE: NOT DETECTED
GFR SERPL CREATININE-BSD FRML MDRD: 30 ML/MIN/1.73
GLOBULIN UR ELPH-MCNC: 3.1 GM/DL
GLUCOSE BLDC GLUCOMTR-MCNC: 133 MG/DL (ref 70–130)
GLUCOSE SERPL-MCNC: 113 MG/DL (ref 65–99)
HCT VFR BLD AUTO: 42 % (ref 37.5–51)
HGB BLD-MCNC: 13.1 G/DL (ref 13–17.7)
INR PPP: 2.68 (ref 0.9–1.1)
LYMPHOCYTES # BLD AUTO: 0.55 10*3/MM3 (ref 0.7–3.1)
LYMPHOCYTES NFR BLD AUTO: 7.5 % (ref 19.6–45.3)
MCH RBC QN AUTO: 26 PG (ref 26.6–33)
MCHC RBC AUTO-ENTMCNC: 31.2 G/DL (ref 31.5–35.7)
MCV RBC AUTO: 83.3 FL (ref 79–97)
MONOCYTES # BLD AUTO: 0.31 10*3/MM3 (ref 0.1–0.9)
MONOCYTES NFR BLD AUTO: 4.2 % (ref 5–12)
NEUTROPHILS NFR BLD AUTO: 6.42 10*3/MM3 (ref 1.7–7)
NEUTROPHILS NFR BLD AUTO: 87.2 % (ref 42.7–76)
NOROVIRUS GI+II RNA STL QL NAA+NON-PROBE: NOT DETECTED
P SHIGELLOIDES DNA STL QL NAA+NON-PROBE: NOT DETECTED
PLATELET # BLD AUTO: 163 10*3/MM3 (ref 140–450)
PMV BLD AUTO: 12 FL (ref 6–12)
POTASSIUM SERPL-SCNC: 4.1 MMOL/L (ref 3.5–5.2)
PROT SERPL-MCNC: 5.8 G/DL (ref 6–8.5)
PROTHROMBIN TIME: 28.3 SECONDS (ref 11.7–14.2)
RBC # BLD AUTO: 5.04 10*6/MM3 (ref 4.14–5.8)
RVA RNA STL QL NAA+NON-PROBE: NOT DETECTED
S ENT+BONG DNA STL QL NAA+NON-PROBE: NOT DETECTED
SAPO I+II+IV+V RNA STL QL NAA+NON-PROBE: NOT DETECTED
SHIGELLA SP+EIEC IPAH ST NAA+NON-PROBE: NOT DETECTED
SODIUM SERPL-SCNC: 141 MMOL/L (ref 136–145)
V CHOL+PARA+VUL DNA STL QL NAA+NON-PROBE: NOT DETECTED
V CHOLERAE DNA STL QL NAA+NON-PROBE: NOT DETECTED
WBC NRBC COR # BLD: 7.36 10*3/MM3 (ref 3.4–10.8)
Y ENTEROCOL DNA STL QL NAA+NON-PROBE: NOT DETECTED

## 2022-01-31 PROCEDURE — 99283 EMERGENCY DEPT VISIT LOW MDM: CPT

## 2022-01-31 PROCEDURE — 0097U HC BIOFIRE FILMARRAY GI PANEL: CPT | Performed by: EMERGENCY MEDICINE

## 2022-01-31 PROCEDURE — 85610 PROTHROMBIN TIME: CPT | Performed by: EMERGENCY MEDICINE

## 2022-01-31 PROCEDURE — 82962 GLUCOSE BLOOD TEST: CPT

## 2022-01-31 PROCEDURE — 80053 COMPREHEN METABOLIC PANEL: CPT | Performed by: EMERGENCY MEDICINE

## 2022-01-31 PROCEDURE — 85025 COMPLETE CBC W/AUTO DIFF WBC: CPT | Performed by: EMERGENCY MEDICINE

## 2022-01-31 RX ADMIN — SODIUM CHLORIDE 500 ML: 9 INJECTION, SOLUTION INTRAVENOUS at 10:16

## 2022-02-04 ENCOUNTER — ANTICOAGULATION VISIT (OUTPATIENT)
Dept: PHARMACY | Facility: HOSPITAL | Age: 87
End: 2022-02-04

## 2022-02-04 DIAGNOSIS — Z95.2 HX OF AORTIC VALVE REPLACEMENT, MECHANICAL: Primary | ICD-10-CM

## 2022-02-04 DIAGNOSIS — I63.411 CEREBROVASCULAR ACCIDENT (CVA) DUE TO EMBOLISM OF RIGHT MIDDLE CEREBRAL ARTERY: ICD-10-CM

## 2022-02-04 LAB — INR PPP: 3.2

## 2022-02-04 NOTE — PROGRESS NOTES
Anticoagulation Clinic Progress Note    Anticoagulation Summary  As of 2/4/2022    INR goal:  3.0-3.5   TTR:  68.0 % (3.3 y)   INR used for dosing:  3.20 (2/4/2022)   Warfarin maintenance plan:  7.5 mg every Mon, Wed, Fri; 5 mg all other days   Weekly warfarin total:  42.5 mg   No change documented:  Michelle Piña RPH   Plan last modified:  Vasquez Niño, PharmD (12/16/2020)   Next INR check:  2/11/2022   Priority:  High   Target end date:  Indefinite    Indications    Hx of aortic valve replacement  mechanical [Z95.2] [Z95.2]  Atrial fibrillation (HCC) [I48.91]  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]             Anticoagulation Episode Summary     INR check location:      Preferred lab:      Send INR reminders to:   CHRIS GUTIERREZ  POOL    Comments:  New INR goal 3 - 3.5 (see 1/2020 hospitalization for CVA)      Anticoagulation Care Providers     Provider Role Specialty Phone number    Griffin Fried MD Referring Cardiology [phone]          Clinic Interview:  Patient Findings     Positives:  Emergency department visit    Negatives:  Signs/symptoms of thrombosis, Signs/symptoms of bleeding,   Laboratory test error suspected, Change in health, Change in alcohol use,   Change in activity, Upcoming invasive procedure, Upcoming dental   procedure, Missed doses, Extra doses, Change in medications, Change in   diet/appetite, Hospital admission, Bruising, Other complaints    Comments:  ED visit for diarrhea x 5 days       Clinical Outcomes     Negatives:  Major bleeding event, Thromboembolic event,   Anticoagulation-related hospital admission, Anticoagulation-related ED   visit, Anticoagulation-related fatality    Comments:  ED visit for diarrhea x 5 days         INR History:  Anticoagulation Monitoring 1/21/2022 1/28/2022 2/4/2022   INR 3.30 3.00 3.20   INR Date 1/21/2022 1/28/2022 2/4/2022   INR Goal 3.0-3.5 3.0-3.5 3.0-3.5   Trend Same Same  Same   Last Week Total 42.5 mg 42.5 mg 42.5 mg   Next Week Total 42.5 mg 42.5 mg 42.5 mg   Sun 5 mg 5 mg 5 mg   Mon 7.5 mg 7.5 mg 7.5 mg   Tue 5 mg 5 mg 5 mg   Wed 7.5 mg 7.5 mg 7.5 mg   Thu 5 mg 5 mg 5 mg   Fri 7.5 mg 7.5 mg 7.5 mg   Sat 5 mg 5 mg 5 mg   Visit Report - - -   Some recent data might be hidden       Plan:  1. INR is Therapeutic today- see above in Anticoagulation Summary.   Will instruct Francisco Sequeira to Continue their warfarin regimen- see above in Anticoagulation Summary.  2. Follow up in 1 weeks  3.  They have been instructed to call if any changes in medications, doses, concerns, etc. Patient expresses understanding and has no further questions at this time.    Michelle Piña [facility]

## 2022-02-08 DIAGNOSIS — R19.7 DIARRHEA, UNSPECIFIED TYPE: Primary | ICD-10-CM

## 2022-02-10 RX ORDER — FUROSEMIDE 20 MG/1
TABLET ORAL
Qty: 45 TABLET | Refills: 3 | OUTPATIENT
Start: 2022-02-10

## 2022-02-11 ENCOUNTER — ANTICOAGULATION VISIT (OUTPATIENT)
Dept: PHARMACY | Facility: HOSPITAL | Age: 87
End: 2022-02-11

## 2022-02-11 DIAGNOSIS — I63.411 CEREBROVASCULAR ACCIDENT (CVA) DUE TO EMBOLISM OF RIGHT MIDDLE CEREBRAL ARTERY: ICD-10-CM

## 2022-02-11 DIAGNOSIS — Z95.2 HX OF AORTIC VALVE REPLACEMENT, MECHANICAL: Primary | ICD-10-CM

## 2022-02-11 LAB — INR PPP: 3.5

## 2022-02-11 NOTE — PROGRESS NOTES
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    INR History:  Anticoagulation Monitoring 1/28/2022 2/4/2022 2/11/2022   INR 3.00 3.20 3.50   INR Date 1/28/2022 2/4/2022 2/11/2022   INR Goal 3.0-3.5 3.0-3.5 3.0-3.5   Trend Same Same Same   Last Week Total 42.5 mg 42.5 mg 42.5 mg   Next Week Total 42.5 mg 42.5 mg 42.5 mg   Sun 5 mg 5 mg 5 mg   Mon 7.5 mg 7.5 mg 7.5 mg   Tue 5 mg 5 mg 5 mg   Wed 7.5 mg 7.5 mg 7.5 mg   Thu 5 mg 5 mg 5 mg   Fri 7.5 mg 7.5 mg 7.5 mg   Sat 5 mg 5 mg 5 mg   Visit Report - - -   Some recent data might be hidden       Plan:  1. INR is therapeutic today- see above in Anticoagulation Summary.    Francisco Sequeira to continue their warfarin regimen- see above in Anticoagulation Summary.  2. Follow up in 1 weeks  3. Pt has agreed to only be called if INR out of range. They have been instructed to call if any changes in medications, doses, concerns, etc. Patient  expresses understanding and has no further questions at this time.    Princess Robin

## 2022-02-14 ENCOUNTER — HOSPITAL ENCOUNTER (INPATIENT)
Facility: HOSPITAL | Age: 87
LOS: 15 days | Discharge: HOME HEALTH | End: 2022-03-02
Attending: EMERGENCY MEDICINE | Admitting: HOSPITALIST

## 2022-02-14 DIAGNOSIS — E11.9 TYPE 2 DIABETES MELLITUS WITHOUT COMPLICATION, WITH LONG-TERM CURRENT USE OF INSULIN: ICD-10-CM

## 2022-02-14 DIAGNOSIS — N17.9 ACUTE RENAL FAILURE SUPERIMPOSED ON CHRONIC KIDNEY DISEASE, UNSPECIFIED CKD STAGE, UNSPECIFIED ACUTE RENAL FAILURE TYPE: ICD-10-CM

## 2022-02-14 DIAGNOSIS — A04.72 C. DIFFICILE COLITIS: Primary | ICD-10-CM

## 2022-02-14 DIAGNOSIS — N18.9 ACUTE RENAL FAILURE SUPERIMPOSED ON CHRONIC KIDNEY DISEASE, UNSPECIFIED CKD STAGE, UNSPECIFIED ACUTE RENAL FAILURE TYPE: ICD-10-CM

## 2022-02-14 DIAGNOSIS — Z79.01 ON COUMADIN FOR ATRIAL FIBRILLATION: ICD-10-CM

## 2022-02-14 DIAGNOSIS — E78.5 HYPERLIPIDEMIA, UNSPECIFIED HYPERLIPIDEMIA TYPE: ICD-10-CM

## 2022-02-14 DIAGNOSIS — I10 PRIMARY HYPERTENSION: ICD-10-CM

## 2022-02-14 DIAGNOSIS — I48.91 ON COUMADIN FOR ATRIAL FIBRILLATION: ICD-10-CM

## 2022-02-14 DIAGNOSIS — Z79.4 TYPE 2 DIABETES MELLITUS WITHOUT COMPLICATION, WITH LONG-TERM CURRENT USE OF INSULIN: ICD-10-CM

## 2022-02-14 DIAGNOSIS — D72.829 LEUKOCYTOSIS, UNSPECIFIED TYPE: ICD-10-CM

## 2022-02-14 LAB
BASOPHILS # BLD AUTO: 0.08 10*3/MM3 (ref 0–0.2)
BASOPHILS NFR BLD AUTO: 0.4 % (ref 0–1.5)
DEPRECATED RDW RBC AUTO: 44.1 FL (ref 37–54)
EOSINOPHIL # BLD AUTO: 0.04 10*3/MM3 (ref 0–0.4)
EOSINOPHIL NFR BLD AUTO: 0.2 % (ref 0.3–6.2)
ERYTHROCYTE [DISTWIDTH] IN BLOOD BY AUTOMATED COUNT: 15.2 % (ref 12.3–15.4)
HCT VFR BLD AUTO: 54.4 % (ref 37.5–51)
HGB BLD-MCNC: 17 G/DL (ref 13–17.7)
LYMPHOCYTES # BLD AUTO: 1.79 10*3/MM3 (ref 0.7–3.1)
LYMPHOCYTES NFR BLD AUTO: 8.9 % (ref 19.6–45.3)
MCH RBC QN AUTO: 26 PG (ref 26.6–33)
MCHC RBC AUTO-ENTMCNC: 31.3 G/DL (ref 31.5–35.7)
MCV RBC AUTO: 83.2 FL (ref 79–97)
MONOCYTES # BLD AUTO: 1.68 10*3/MM3 (ref 0.1–0.9)
MONOCYTES NFR BLD AUTO: 8.3 % (ref 5–12)
NEUTROPHILS NFR BLD AUTO: 16.44 10*3/MM3 (ref 1.7–7)
NEUTROPHILS NFR BLD AUTO: 81.7 % (ref 42.7–76)
PLATELET # BLD AUTO: 209 10*3/MM3 (ref 140–450)
RBC # BLD AUTO: 6.54 10*6/MM3 (ref 4.14–5.8)
WBC NRBC COR # BLD: 20.13 10*3/MM3 (ref 3.4–10.8)

## 2022-02-14 PROCEDURE — 83690 ASSAY OF LIPASE: CPT | Performed by: EMERGENCY MEDICINE

## 2022-02-14 PROCEDURE — 99285 EMERGENCY DEPT VISIT HI MDM: CPT

## 2022-02-14 PROCEDURE — 0097U HC BIOFIRE FILMARRAY GI PANEL: CPT | Performed by: PHYSICIAN ASSISTANT

## 2022-02-14 PROCEDURE — 87493 C DIFF AMPLIFIED PROBE: CPT | Performed by: PHYSICIAN ASSISTANT

## 2022-02-14 PROCEDURE — 85025 COMPLETE CBC W/AUTO DIFF WBC: CPT | Performed by: EMERGENCY MEDICINE

## 2022-02-14 PROCEDURE — 80053 COMPREHEN METABOLIC PANEL: CPT | Performed by: EMERGENCY MEDICINE

## 2022-02-14 PROCEDURE — 80162 ASSAY OF DIGOXIN TOTAL: CPT | Performed by: PHYSICIAN ASSISTANT

## 2022-02-14 PROCEDURE — 83735 ASSAY OF MAGNESIUM: CPT | Performed by: PHYSICIAN ASSISTANT

## 2022-02-14 RX ORDER — SODIUM CHLORIDE 0.9 % (FLUSH) 0.9 %
10 SYRINGE (ML) INJECTION AS NEEDED
Status: DISCONTINUED | OUTPATIENT
Start: 2022-02-14 | End: 2022-03-02 | Stop reason: HOSPADM

## 2022-02-14 RX ADMIN — SODIUM CHLORIDE, POTASSIUM CHLORIDE, SODIUM LACTATE AND CALCIUM CHLORIDE 500 ML: 600; 310; 30; 20 INJECTION, SOLUTION INTRAVENOUS at 23:43

## 2022-02-15 PROBLEM — A04.72 C. DIFFICILE COLITIS: Status: ACTIVE | Noted: 2022-02-15

## 2022-02-15 LAB
ADV 40+41 DNA STL QL NAA+NON-PROBE: NOT DETECTED
ALBUMIN SERPL-MCNC: 3.1 G/DL (ref 3.5–5.2)
ALBUMIN/GLOB SERPL: 1 G/DL
ALP SERPL-CCNC: 106 U/L (ref 39–117)
ALT SERPL W P-5'-P-CCNC: 16 U/L (ref 1–41)
ANION GAP SERPL CALCULATED.3IONS-SCNC: 16.8 MMOL/L (ref 5–15)
ANION GAP SERPL CALCULATED.3IONS-SCNC: 7.6 MMOL/L (ref 5–15)
AST SERPL-CCNC: 19 U/L (ref 1–40)
ASTRO TYP 1-8 RNA STL QL NAA+NON-PROBE: NOT DETECTED
BILIRUB SERPL-MCNC: 0.4 MG/DL (ref 0–1.2)
BILIRUB UR QL STRIP: NEGATIVE
BUN SERPL-MCNC: 48 MG/DL (ref 8–23)
BUN SERPL-MCNC: 49 MG/DL (ref 8–23)
BUN/CREAT SERPL: 14.5 (ref 7–25)
BUN/CREAT SERPL: 17.2 (ref 7–25)
C CAYETANENSIS DNA STL QL NAA+NON-PROBE: NOT DETECTED
C COLI+JEJ+UPSA DNA STL QL NAA+NON-PROBE: NOT DETECTED
C DIFF TOX GENS STL QL NAA+PROBE: POSITIVE
CALCIUM SPEC-SCNC: 7.9 MG/DL (ref 8.6–10.5)
CALCIUM SPEC-SCNC: 9.1 MG/DL (ref 8.6–10.5)
CHLORIDE SERPL-SCNC: 101 MMOL/L (ref 98–107)
CHLORIDE SERPL-SCNC: 99 MMOL/L (ref 98–107)
CLARITY UR: CLEAR
CO2 SERPL-SCNC: 20.2 MMOL/L (ref 22–29)
CO2 SERPL-SCNC: 26.4 MMOL/L (ref 22–29)
COLOR UR: YELLOW
CREAT SERPL-MCNC: 2.85 MG/DL (ref 0.76–1.27)
CREAT SERPL-MCNC: 3.31 MG/DL (ref 0.76–1.27)
CRYPTOSP DNA STL QL NAA+NON-PROBE: NOT DETECTED
DEPRECATED RDW RBC AUTO: 46.4 FL (ref 37–54)
DIGOXIN SERPL-MCNC: 0.9 NG/ML (ref 0.6–1.2)
E HISTOLYT DNA STL QL NAA+NON-PROBE: NOT DETECTED
EAEC PAA PLAS AGGR+AATA ST NAA+NON-PRB: NOT DETECTED
EC STX1+STX2 GENES STL QL NAA+NON-PROBE: NOT DETECTED
EPEC EAE GENE STL QL NAA+NON-PROBE: NOT DETECTED
ERYTHROCYTE [DISTWIDTH] IN BLOOD BY AUTOMATED COUNT: 15.8 % (ref 12.3–15.4)
ETEC LTA+ST1A+ST1B TOX ST NAA+NON-PROBE: NOT DETECTED
G LAMBLIA DNA STL QL NAA+NON-PROBE: NOT DETECTED
GFR SERPL CREATININE-BSD FRML MDRD: 18 ML/MIN/1.73
GFR SERPL CREATININE-BSD FRML MDRD: 21 ML/MIN/1.73
GLOBULIN UR ELPH-MCNC: 3 GM/DL
GLUCOSE BLDC GLUCOMTR-MCNC: 115 MG/DL (ref 70–130)
GLUCOSE BLDC GLUCOMTR-MCNC: 116 MG/DL (ref 70–130)
GLUCOSE BLDC GLUCOMTR-MCNC: 126 MG/DL (ref 70–130)
GLUCOSE BLDC GLUCOMTR-MCNC: 167 MG/DL (ref 70–130)
GLUCOSE SERPL-MCNC: 202 MG/DL (ref 65–99)
GLUCOSE SERPL-MCNC: 217 MG/DL (ref 65–99)
GLUCOSE UR STRIP-MCNC: NEGATIVE MG/DL
HCT VFR BLD AUTO: 40.3 % (ref 37.5–51)
HEMOCCULT STL QL: POSITIVE
HGB BLD-MCNC: 12.6 G/DL (ref 13–17.7)
HGB UR QL STRIP.AUTO: NEGATIVE
HOLD SPECIMEN: NORMAL
HOLD SPECIMEN: NORMAL
INR PPP: 2.23 (ref 0.9–1.1)
INR PPP: 2.26 (ref 0.9–1.1)
KETONES UR QL STRIP: NEGATIVE
LEUKOCYTE ESTERASE UR QL STRIP.AUTO: NEGATIVE
LIPASE SERPL-CCNC: 18 U/L (ref 13–60)
MAGNESIUM SERPL-MCNC: 2.6 MG/DL (ref 1.6–2.4)
MCH RBC QN AUTO: 25.5 PG (ref 26.6–33)
MCHC RBC AUTO-ENTMCNC: 31.3 G/DL (ref 31.5–35.7)
MCV RBC AUTO: 81.4 FL (ref 79–97)
NITRITE UR QL STRIP: NEGATIVE
NOROVIRUS GI+II RNA STL QL NAA+NON-PROBE: NOT DETECTED
P SHIGELLOIDES DNA STL QL NAA+NON-PROBE: NOT DETECTED
PH UR STRIP.AUTO: <=5 [PH] (ref 5–8)
PLATELET # BLD AUTO: 165 10*3/MM3 (ref 140–450)
POTASSIUM SERPL-SCNC: 3.3 MMOL/L (ref 3.5–5.2)
POTASSIUM SERPL-SCNC: 3.8 MMOL/L (ref 3.5–5.2)
PROT SERPL-MCNC: 6.1 G/DL (ref 6–8.5)
PROT UR QL STRIP: NEGATIVE
PROTHROMBIN TIME: 24.8 SECONDS (ref 11.7–14.2)
PROTHROMBIN TIME: 25 SECONDS (ref 11.7–14.2)
RBC # BLD AUTO: 4.95 10*6/MM3 (ref 4.14–5.8)
RVA RNA STL QL NAA+NON-PROBE: NOT DETECTED
S ENT+BONG DNA STL QL NAA+NON-PROBE: NOT DETECTED
SAPO I+II+IV+V RNA STL QL NAA+NON-PROBE: NOT DETECTED
SARS-COV-2 RNA RESP QL NAA+PROBE: NOT DETECTED
SHIGELLA SP+EIEC IPAH ST NAA+NON-PROBE: NOT DETECTED
SODIUM SERPL-SCNC: 135 MMOL/L (ref 136–145)
SODIUM SERPL-SCNC: 136 MMOL/L (ref 136–145)
SP GR UR STRIP: >=1.03 (ref 1–1.03)
UROBILINOGEN UR QL STRIP: NORMAL
V CHOL+PARA+VUL DNA STL QL NAA+NON-PROBE: NOT DETECTED
V CHOLERAE DNA STL QL NAA+NON-PROBE: NOT DETECTED
WBC NRBC COR # BLD: 13.7 10*3/MM3 (ref 3.4–10.8)
WHOLE BLOOD HOLD SPECIMEN: NORMAL
WHOLE BLOOD HOLD SPECIMEN: NORMAL
Y ENTEROCOL DNA STL QL NAA+NON-PROBE: NOT DETECTED

## 2022-02-15 PROCEDURE — 82962 GLUCOSE BLOOD TEST: CPT

## 2022-02-15 PROCEDURE — 81003 URINALYSIS AUTO W/O SCOPE: CPT | Performed by: EMERGENCY MEDICINE

## 2022-02-15 PROCEDURE — 82272 OCCULT BLD FECES 1-3 TESTS: CPT | Performed by: NURSE PRACTITIONER

## 2022-02-15 PROCEDURE — 85027 COMPLETE CBC AUTOMATED: CPT | Performed by: NURSE PRACTITIONER

## 2022-02-15 PROCEDURE — 80048 BASIC METABOLIC PNL TOTAL CA: CPT | Performed by: NURSE PRACTITIONER

## 2022-02-15 PROCEDURE — 99222 1ST HOSP IP/OBS MODERATE 55: CPT | Performed by: PHYSICIAN ASSISTANT

## 2022-02-15 PROCEDURE — 85610 PROTHROMBIN TIME: CPT | Performed by: PHYSICIAN ASSISTANT

## 2022-02-15 PROCEDURE — 36415 COLL VENOUS BLD VENIPUNCTURE: CPT | Performed by: NURSE PRACTITIONER

## 2022-02-15 PROCEDURE — 85610 PROTHROMBIN TIME: CPT | Performed by: HOSPITALIST

## 2022-02-15 PROCEDURE — U0003 INFECTIOUS AGENT DETECTION BY NUCLEIC ACID (DNA OR RNA); SEVERE ACUTE RESPIRATORY SYNDROME CORONAVIRUS 2 (SARS-COV-2) (CORONAVIRUS DISEASE [COVID-19]), AMPLIFIED PROBE TECHNIQUE, MAKING USE OF HIGH THROUGHPUT TECHNOLOGIES AS DESCRIBED BY CMS-2020-01-R: HCPCS | Performed by: PHYSICIAN ASSISTANT

## 2022-02-15 PROCEDURE — 92610 EVALUATE SWALLOWING FUNCTION: CPT

## 2022-02-15 PROCEDURE — 63710000001 INSULIN LISPRO (HUMAN) PER 5 UNITS: Performed by: NURSE PRACTITIONER

## 2022-02-15 RX ORDER — FERROUS SULFATE 325(65) MG
325 TABLET ORAL EVERY OTHER DAY
Status: DISCONTINUED | OUTPATIENT
Start: 2022-02-15 | End: 2022-03-02 | Stop reason: HOSPADM

## 2022-02-15 RX ORDER — INSULIN LISPRO 100 [IU]/ML
0-7 INJECTION, SOLUTION INTRAVENOUS; SUBCUTANEOUS
Status: DISCONTINUED | OUTPATIENT
Start: 2022-02-15 | End: 2022-03-02 | Stop reason: HOSPADM

## 2022-02-15 RX ORDER — CALCIUM CARBONATE 200(500)MG
2 TABLET,CHEWABLE ORAL 2 TIMES DAILY PRN
Status: DISCONTINUED | OUTPATIENT
Start: 2022-02-15 | End: 2022-03-02 | Stop reason: HOSPADM

## 2022-02-15 RX ORDER — SODIUM CHLORIDE 0.9 % (FLUSH) 0.9 %
10 SYRINGE (ML) INJECTION EVERY 12 HOURS SCHEDULED
Status: DISCONTINUED | OUTPATIENT
Start: 2022-02-15 | End: 2022-03-02 | Stop reason: HOSPADM

## 2022-02-15 RX ORDER — SODIUM CHLORIDE 0.9 % (FLUSH) 0.9 %
10 SYRINGE (ML) INJECTION AS NEEDED
Status: DISCONTINUED | OUTPATIENT
Start: 2022-02-15 | End: 2022-03-02 | Stop reason: HOSPADM

## 2022-02-15 RX ORDER — ACETAMINOPHEN 325 MG/1
650 TABLET ORAL EVERY 4 HOURS PRN
Status: DISCONTINUED | OUTPATIENT
Start: 2022-02-15 | End: 2022-03-02 | Stop reason: HOSPADM

## 2022-02-15 RX ORDER — DIGOXIN 125 MCG
125 TABLET ORAL DAILY
Status: DISCONTINUED | OUTPATIENT
Start: 2022-02-15 | End: 2022-03-02 | Stop reason: HOSPADM

## 2022-02-15 RX ORDER — WARFARIN SODIUM 5 MG/1
5 TABLET ORAL
Status: DISCONTINUED | OUTPATIENT
Start: 2022-02-15 | End: 2022-03-02 | Stop reason: HOSPADM

## 2022-02-15 RX ORDER — ACETAMINOPHEN 650 MG/1
650 SUPPOSITORY RECTAL EVERY 4 HOURS PRN
Status: DISCONTINUED | OUTPATIENT
Start: 2022-02-15 | End: 2022-03-02 | Stop reason: HOSPADM

## 2022-02-15 RX ORDER — METOPROLOL SUCCINATE 25 MG/1
25 TABLET, EXTENDED RELEASE ORAL DAILY
Status: DISCONTINUED | OUTPATIENT
Start: 2022-02-15 | End: 2022-02-18

## 2022-02-15 RX ORDER — ONDANSETRON 2 MG/ML
4 INJECTION INTRAMUSCULAR; INTRAVENOUS EVERY 6 HOURS PRN
Status: DISCONTINUED | OUTPATIENT
Start: 2022-02-15 | End: 2022-03-02 | Stop reason: HOSPADM

## 2022-02-15 RX ORDER — VANCOMYCIN HYDROCHLORIDE 125 MG/1
125 CAPSULE ORAL EVERY 6 HOURS SCHEDULED
Status: DISCONTINUED | OUTPATIENT
Start: 2022-02-15 | End: 2022-02-23

## 2022-02-15 RX ORDER — GUAIFENESIN 600 MG/1
600 TABLET, EXTENDED RELEASE ORAL EVERY 12 HOURS
Status: DISCONTINUED | OUTPATIENT
Start: 2022-02-15 | End: 2022-02-22

## 2022-02-15 RX ORDER — SODIUM CHLORIDE 9 MG/ML
75 INJECTION, SOLUTION INTRAVENOUS CONTINUOUS
Status: DISCONTINUED | OUTPATIENT
Start: 2022-02-15 | End: 2022-02-18

## 2022-02-15 RX ORDER — NICOTINE POLACRILEX 4 MG
15 LOZENGE BUCCAL
Status: DISCONTINUED | OUTPATIENT
Start: 2022-02-15 | End: 2022-03-02 | Stop reason: HOSPADM

## 2022-02-15 RX ORDER — DIPHENHYDRAMINE HCL 25 MG
25 CAPSULE ORAL 2 TIMES DAILY PRN
Status: DISCONTINUED | OUTPATIENT
Start: 2022-02-15 | End: 2022-03-02 | Stop reason: HOSPADM

## 2022-02-15 RX ORDER — MAGNESIUM OXIDE 400 MG/1
400 TABLET ORAL 2 TIMES DAILY
Status: DISCONTINUED | OUTPATIENT
Start: 2022-02-15 | End: 2022-02-16

## 2022-02-15 RX ORDER — ACETAMINOPHEN 160 MG/5ML
650 SOLUTION ORAL EVERY 4 HOURS PRN
Status: DISCONTINUED | OUTPATIENT
Start: 2022-02-15 | End: 2022-03-02 | Stop reason: HOSPADM

## 2022-02-15 RX ORDER — WARFARIN SODIUM 5 MG/1
5 TABLET ORAL
Status: DISCONTINUED | OUTPATIENT
Start: 2022-02-15 | End: 2022-02-15

## 2022-02-15 RX ORDER — FAMOTIDINE 20 MG/1
20 TABLET, FILM COATED ORAL
Status: DISCONTINUED | OUTPATIENT
Start: 2022-02-15 | End: 2022-03-02 | Stop reason: HOSPADM

## 2022-02-15 RX ORDER — DEXTROSE MONOHYDRATE 25 G/50ML
25 INJECTION, SOLUTION INTRAVENOUS
Status: DISCONTINUED | OUTPATIENT
Start: 2022-02-15 | End: 2022-03-02 | Stop reason: HOSPADM

## 2022-02-15 RX ORDER — ONDANSETRON 4 MG/1
4 TABLET, FILM COATED ORAL EVERY 6 HOURS PRN
Status: DISCONTINUED | OUTPATIENT
Start: 2022-02-15 | End: 2022-03-02 | Stop reason: HOSPADM

## 2022-02-15 RX ORDER — ASPIRIN 81 MG/1
81 TABLET ORAL DAILY
Status: DISCONTINUED | OUTPATIENT
Start: 2022-02-15 | End: 2022-03-02 | Stop reason: HOSPADM

## 2022-02-15 RX ORDER — POTASSIUM CHLORIDE 750 MG/1
40 TABLET, FILM COATED, EXTENDED RELEASE ORAL ONCE
Status: COMPLETED | OUTPATIENT
Start: 2022-02-15 | End: 2022-02-15

## 2022-02-15 RX ORDER — WARFARIN SODIUM 7.5 MG/1
7.5 TABLET ORAL
Status: DISCONTINUED | OUTPATIENT
Start: 2022-02-16 | End: 2022-02-16

## 2022-02-15 RX ORDER — ATORVASTATIN CALCIUM 20 MG/1
40 TABLET, FILM COATED ORAL DAILY
Status: DISCONTINUED | OUTPATIENT
Start: 2022-02-15 | End: 2022-03-02 | Stop reason: HOSPADM

## 2022-02-15 RX ADMIN — VANCOMYCIN HYDROCHLORIDE 125 MG: 125 CAPSULE ORAL at 15:32

## 2022-02-15 RX ADMIN — SODIUM CHLORIDE 125 ML/HR: 9 INJECTION, SOLUTION INTRAVENOUS at 15:34

## 2022-02-15 RX ADMIN — VANCOMYCIN HYDROCHLORIDE 125 MG: 125 CAPSULE ORAL at 02:09

## 2022-02-15 RX ADMIN — MAGNESIUM OXIDE 400 MG (241.3 MG MAGNESIUM) TABLET 400 MG: TABLET at 11:52

## 2022-02-15 RX ADMIN — DIGOXIN 125 MCG: 125 TABLET ORAL at 11:52

## 2022-02-15 RX ADMIN — WARFARIN 5 MG: 5 TABLET ORAL at 17:44

## 2022-02-15 RX ADMIN — INSULIN LISPRO 2 UNITS: 100 INJECTION, SOLUTION INTRAVENOUS; SUBCUTANEOUS at 11:55

## 2022-02-15 RX ADMIN — SODIUM CHLORIDE, PRESERVATIVE FREE 10 ML: 5 INJECTION INTRAVENOUS at 08:04

## 2022-02-15 RX ADMIN — ATORVASTATIN CALCIUM 40 MG: 20 TABLET, FILM COATED ORAL at 11:53

## 2022-02-15 RX ADMIN — GUAIFENESIN 600 MG: 600 TABLET, EXTENDED RELEASE ORAL at 11:53

## 2022-02-15 RX ADMIN — METOPROLOL SUCCINATE 25 MG: 25 TABLET, EXTENDED RELEASE ORAL at 11:52

## 2022-02-15 RX ADMIN — MAGNESIUM OXIDE 400 MG (241.3 MG MAGNESIUM) TABLET 400 MG: TABLET at 20:22

## 2022-02-15 RX ADMIN — VANCOMYCIN HYDROCHLORIDE 125 MG: 125 CAPSULE ORAL at 20:22

## 2022-02-15 RX ADMIN — SODIUM CHLORIDE 100 ML/HR: 9 INJECTION, SOLUTION INTRAVENOUS at 08:03

## 2022-02-15 RX ADMIN — FAMOTIDINE 20 MG: 20 TABLET, FILM COATED ORAL at 17:44

## 2022-02-15 RX ADMIN — POTASSIUM CHLORIDE 40 MEQ: 750 TABLET, EXTENDED RELEASE ORAL at 14:18

## 2022-02-15 RX ADMIN — FERROUS SULFATE TAB 325 MG (65 MG ELEMENTAL FE) 325 MG: 325 (65 FE) TAB at 11:52

## 2022-02-15 RX ADMIN — SODIUM CHLORIDE, PRESERVATIVE FREE 10 ML: 5 INJECTION INTRAVENOUS at 20:22

## 2022-02-15 RX ADMIN — ASPIRIN 81 MG: 81 TABLET, COATED ORAL at 11:53

## 2022-02-15 RX ADMIN — VANCOMYCIN HYDROCHLORIDE 125 MG: 125 CAPSULE ORAL at 09:33

## 2022-02-16 LAB
ALBUMIN SERPL-MCNC: 1.9 G/DL (ref 3.5–5.2)
ALBUMIN/GLOB SERPL: 0.8 G/DL
ALP SERPL-CCNC: 67 U/L (ref 39–117)
ALT SERPL W P-5'-P-CCNC: 11 U/L (ref 1–41)
ANION GAP SERPL CALCULATED.3IONS-SCNC: 8 MMOL/L (ref 5–15)
APTT PPP: 38.5 SECONDS (ref 22.7–35.4)
APTT PPP: 73.7 SECONDS (ref 22.7–35.4)
AST SERPL-CCNC: 12 U/L (ref 1–40)
BASOPHILS # BLD AUTO: 0.05 10*3/MM3 (ref 0–0.2)
BASOPHILS NFR BLD AUTO: 0.6 % (ref 0–1.5)
BILIRUB SERPL-MCNC: 0.3 MG/DL (ref 0–1.2)
BUN SERPL-MCNC: 41 MG/DL (ref 8–23)
BUN/CREAT SERPL: 21.2 (ref 7–25)
CALCIUM SPEC-SCNC: 7.4 MG/DL (ref 8.6–10.5)
CHLORIDE SERPL-SCNC: 110 MMOL/L (ref 98–107)
CO2 SERPL-SCNC: 21 MMOL/L (ref 22–29)
CREAT SERPL-MCNC: 1.93 MG/DL (ref 0.76–1.27)
DEPRECATED RDW RBC AUTO: 42.9 FL (ref 37–54)
DEPRECATED RDW RBC AUTO: 44 FL (ref 37–54)
EOSINOPHIL # BLD AUTO: 0.22 10*3/MM3 (ref 0–0.4)
EOSINOPHIL NFR BLD AUTO: 2.5 % (ref 0.3–6.2)
ERYTHROCYTE [DISTWIDTH] IN BLOOD BY AUTOMATED COUNT: 15.1 % (ref 12.3–15.4)
ERYTHROCYTE [DISTWIDTH] IN BLOOD BY AUTOMATED COUNT: 15.4 % (ref 12.3–15.4)
GFR SERPL CREATININE-BSD FRML MDRD: 33 ML/MIN/1.73
GLOBULIN UR ELPH-MCNC: 2.3 GM/DL
GLUCOSE BLDC GLUCOMTR-MCNC: 113 MG/DL (ref 70–130)
GLUCOSE BLDC GLUCOMTR-MCNC: 132 MG/DL (ref 70–130)
GLUCOSE BLDC GLUCOMTR-MCNC: 158 MG/DL (ref 70–130)
GLUCOSE BLDC GLUCOMTR-MCNC: 179 MG/DL (ref 70–130)
GLUCOSE SERPL-MCNC: 114 MG/DL (ref 65–99)
HCT VFR BLD AUTO: 37.8 % (ref 37.5–51)
HCT VFR BLD AUTO: 38.2 % (ref 37.5–51)
HGB BLD-MCNC: 12.2 G/DL (ref 13–17.7)
HGB BLD-MCNC: 12.5 G/DL (ref 13–17.7)
INR PPP: 1.83 (ref 0.9–1.1)
INR PPP: 1.88 (ref 0.9–1.1)
LYMPHOCYTES # BLD AUTO: 0.78 10*3/MM3 (ref 0.7–3.1)
LYMPHOCYTES NFR BLD AUTO: 9 % (ref 19.6–45.3)
MAGNESIUM SERPL-MCNC: 2.5 MG/DL (ref 1.6–2.4)
MCH RBC QN AUTO: 25.8 PG (ref 26.6–33)
MCH RBC QN AUTO: 26 PG (ref 26.6–33)
MCHC RBC AUTO-ENTMCNC: 32.3 G/DL (ref 31.5–35.7)
MCHC RBC AUTO-ENTMCNC: 32.7 G/DL (ref 31.5–35.7)
MCV RBC AUTO: 79.6 FL (ref 79–97)
MCV RBC AUTO: 80.1 FL (ref 79–97)
MONOCYTES # BLD AUTO: 0.5 10*3/MM3 (ref 0.1–0.9)
MONOCYTES NFR BLD AUTO: 5.8 % (ref 5–12)
NEUTROPHILS NFR BLD AUTO: 7.1 10*3/MM3 (ref 1.7–7)
NEUTROPHILS NFR BLD AUTO: 81.8 % (ref 42.7–76)
PHOSPHATE SERPL-MCNC: 3.3 MG/DL (ref 2.5–4.5)
PLATELET # BLD AUTO: 163 10*3/MM3 (ref 140–450)
PLATELET # BLD AUTO: 167 10*3/MM3 (ref 140–450)
PMV BLD AUTO: 10.9 FL (ref 6–12)
PMV BLD AUTO: 11.5 FL (ref 6–12)
POTASSIUM SERPL-SCNC: 3.2 MMOL/L (ref 3.5–5.2)
PROT SERPL-MCNC: 4.2 G/DL (ref 6–8.5)
PROTHROMBIN TIME: 21.2 SECONDS (ref 11.7–14.2)
PROTHROMBIN TIME: 21.6 SECONDS (ref 11.7–14.2)
RBC # BLD AUTO: 4.72 10*6/MM3 (ref 4.14–5.8)
RBC # BLD AUTO: 4.8 10*6/MM3 (ref 4.14–5.8)
SODIUM SERPL-SCNC: 139 MMOL/L (ref 136–145)
WBC NRBC COR # BLD: 8.68 10*3/MM3 (ref 3.4–10.8)
WBC NRBC COR # BLD: 8.96 10*3/MM3 (ref 3.4–10.8)

## 2022-02-16 PROCEDURE — 25010000002 HEPARIN (PORCINE) 25000-0.45 UT/250ML-% SOLUTION: Performed by: NURSE PRACTITIONER

## 2022-02-16 PROCEDURE — 99232 SBSQ HOSP IP/OBS MODERATE 35: CPT | Performed by: INTERNAL MEDICINE

## 2022-02-16 PROCEDURE — 85610 PROTHROMBIN TIME: CPT | Performed by: NURSE PRACTITIONER

## 2022-02-16 PROCEDURE — 97162 PT EVAL MOD COMPLEX 30 MIN: CPT

## 2022-02-16 PROCEDURE — 85027 COMPLETE CBC AUTOMATED: CPT | Performed by: NURSE PRACTITIONER

## 2022-02-16 PROCEDURE — 82962 GLUCOSE BLOOD TEST: CPT

## 2022-02-16 PROCEDURE — 85730 THROMBOPLASTIN TIME PARTIAL: CPT | Performed by: NURSE PRACTITIONER

## 2022-02-16 PROCEDURE — 85025 COMPLETE CBC W/AUTO DIFF WBC: CPT | Performed by: NURSE PRACTITIONER

## 2022-02-16 PROCEDURE — 63710000001 INSULIN LISPRO (HUMAN) PER 5 UNITS: Performed by: NURSE PRACTITIONER

## 2022-02-16 PROCEDURE — 97530 THERAPEUTIC ACTIVITIES: CPT

## 2022-02-16 PROCEDURE — 85730 THROMBOPLASTIN TIME PARTIAL: CPT | Performed by: HOSPITALIST

## 2022-02-16 PROCEDURE — 83735 ASSAY OF MAGNESIUM: CPT | Performed by: INTERNAL MEDICINE

## 2022-02-16 PROCEDURE — 84100 ASSAY OF PHOSPHORUS: CPT | Performed by: INTERNAL MEDICINE

## 2022-02-16 PROCEDURE — 25010000002 ONDANSETRON PER 1 MG: Performed by: NURSE PRACTITIONER

## 2022-02-16 PROCEDURE — 80053 COMPREHEN METABOLIC PANEL: CPT | Performed by: NURSE PRACTITIONER

## 2022-02-16 RX ORDER — CHOLESTYRAMINE 4 G/9G
1 POWDER, FOR SUSPENSION ORAL EVERY 6 HOURS SCHEDULED
Status: DISCONTINUED | OUTPATIENT
Start: 2022-02-16 | End: 2022-02-17

## 2022-02-16 RX ORDER — WARFARIN SODIUM 10 MG/1
10 TABLET ORAL
Status: COMPLETED | OUTPATIENT
Start: 2022-02-16 | End: 2022-02-16

## 2022-02-16 RX ORDER — HEPARIN SODIUM 5000 [USP'U]/ML
30-60 INJECTION, SOLUTION INTRAVENOUS; SUBCUTANEOUS EVERY 6 HOURS PRN
Status: DISCONTINUED | OUTPATIENT
Start: 2022-02-16 | End: 2022-02-21

## 2022-02-16 RX ORDER — WARFARIN SODIUM 7.5 MG/1
7.5 TABLET ORAL
Status: DISCONTINUED | OUTPATIENT
Start: 2022-02-18 | End: 2022-03-02 | Stop reason: HOSPADM

## 2022-02-16 RX ORDER — HEPARIN SODIUM 10000 [USP'U]/100ML
12 INJECTION, SOLUTION INTRAVENOUS
Status: DISCONTINUED | OUTPATIENT
Start: 2022-02-16 | End: 2022-02-17

## 2022-02-16 RX ORDER — POTASSIUM CHLORIDE 750 MG/1
40 TABLET, FILM COATED, EXTENDED RELEASE ORAL EVERY 4 HOURS
Status: COMPLETED | OUTPATIENT
Start: 2022-02-16 | End: 2022-02-16

## 2022-02-16 RX ADMIN — VANCOMYCIN HYDROCHLORIDE 125 MG: 125 CAPSULE ORAL at 04:35

## 2022-02-16 RX ADMIN — VANCOMYCIN HYDROCHLORIDE 125 MG: 125 CAPSULE ORAL at 18:14

## 2022-02-16 RX ADMIN — HEPARIN SODIUM 12 UNITS/KG/HR: 10000 INJECTION, SOLUTION INTRAVENOUS at 16:25

## 2022-02-16 RX ADMIN — VANCOMYCIN HYDROCHLORIDE 125 MG: 125 CAPSULE ORAL at 14:54

## 2022-02-16 RX ADMIN — DIGOXIN 125 MCG: 125 TABLET ORAL at 08:23

## 2022-02-16 RX ADMIN — ONDANSETRON 4 MG: 2 INJECTION INTRAMUSCULAR; INTRAVENOUS at 22:24

## 2022-02-16 RX ADMIN — ATORVASTATIN CALCIUM 40 MG: 20 TABLET, FILM COATED ORAL at 08:24

## 2022-02-16 RX ADMIN — MAGNESIUM OXIDE 400 MG (241.3 MG MAGNESIUM) TABLET 400 MG: TABLET at 08:24

## 2022-02-16 RX ADMIN — SODIUM CHLORIDE, PRESERVATIVE FREE 10 ML: 5 INJECTION INTRAVENOUS at 21:00

## 2022-02-16 RX ADMIN — FAMOTIDINE 20 MG: 20 TABLET, FILM COATED ORAL at 18:14

## 2022-02-16 RX ADMIN — METOPROLOL SUCCINATE 25 MG: 25 TABLET, EXTENDED RELEASE ORAL at 08:24

## 2022-02-16 RX ADMIN — FAMOTIDINE 20 MG: 20 TABLET, FILM COATED ORAL at 08:24

## 2022-02-16 RX ADMIN — SODIUM CHLORIDE 125 ML/HR: 9 INJECTION, SOLUTION INTRAVENOUS at 04:36

## 2022-02-16 RX ADMIN — ASPIRIN 81 MG: 81 TABLET, COATED ORAL at 08:24

## 2022-02-16 RX ADMIN — INSULIN LISPRO 2 UNITS: 100 INJECTION, SOLUTION INTRAVENOUS; SUBCUTANEOUS at 18:13

## 2022-02-16 RX ADMIN — SODIUM CHLORIDE 125 ML/HR: 9 INJECTION, SOLUTION INTRAVENOUS at 11:07

## 2022-02-16 RX ADMIN — POTASSIUM CHLORIDE 40 MEQ: 750 TABLET, EXTENDED RELEASE ORAL at 18:14

## 2022-02-16 RX ADMIN — GUAIFENESIN 600 MG: 600 TABLET, EXTENDED RELEASE ORAL at 08:23

## 2022-02-16 RX ADMIN — VANCOMYCIN HYDROCHLORIDE 125 MG: 125 CAPSULE ORAL at 08:26

## 2022-02-16 RX ADMIN — WARFARIN 10 MG: 10 TABLET ORAL at 18:14

## 2022-02-16 RX ADMIN — GUAIFENESIN 600 MG: 600 TABLET, EXTENDED RELEASE ORAL at 18:14

## 2022-02-16 RX ADMIN — CHOLESTYRAMINE 1 PACKET: 4 POWDER, FOR SUSPENSION ORAL at 19:48

## 2022-02-16 RX ADMIN — SODIUM CHLORIDE, PRESERVATIVE FREE 10 ML: 5 INJECTION INTRAVENOUS at 08:26

## 2022-02-16 RX ADMIN — POTASSIUM CHLORIDE 40 MEQ: 750 TABLET, EXTENDED RELEASE ORAL at 14:54

## 2022-02-16 RX ADMIN — CHOLESTYRAMINE 1 PACKET: 4 POWDER, FOR SUSPENSION ORAL at 11:40

## 2022-02-17 LAB
ALBUMIN SERPL-MCNC: 1.9 G/DL (ref 3.5–5.2)
ANION GAP SERPL CALCULATED.3IONS-SCNC: 11.6 MMOL/L (ref 5–15)
APTT PPP: 68.4 SECONDS (ref 22.7–35.4)
BASOPHILS # BLD AUTO: 0.05 10*3/MM3 (ref 0–0.2)
BASOPHILS NFR BLD AUTO: 0.5 % (ref 0–1.5)
BUN SERPL-MCNC: 34 MG/DL (ref 8–23)
BUN/CREAT SERPL: 18.8 (ref 7–25)
CALCIUM SPEC-SCNC: 7.7 MG/DL (ref 8.6–10.5)
CHLORIDE SERPL-SCNC: 112 MMOL/L (ref 98–107)
CO2 SERPL-SCNC: 15.4 MMOL/L (ref 22–29)
CREAT SERPL-MCNC: 1.81 MG/DL (ref 0.76–1.27)
DEPRECATED RDW RBC AUTO: 45.7 FL (ref 37–54)
EOSINOPHIL # BLD AUTO: 0.07 10*3/MM3 (ref 0–0.4)
EOSINOPHIL NFR BLD AUTO: 0.7 % (ref 0.3–6.2)
ERYTHROCYTE [DISTWIDTH] IN BLOOD BY AUTOMATED COUNT: 15.3 % (ref 12.3–15.4)
GFR SERPL CREATININE-BSD FRML MDRD: 36 ML/MIN/1.73
GLUCOSE BLDC GLUCOMTR-MCNC: 103 MG/DL (ref 70–130)
GLUCOSE BLDC GLUCOMTR-MCNC: 141 MG/DL (ref 70–130)
GLUCOSE BLDC GLUCOMTR-MCNC: 161 MG/DL (ref 70–130)
GLUCOSE BLDC GLUCOMTR-MCNC: 169 MG/DL (ref 70–130)
GLUCOSE SERPL-MCNC: 202 MG/DL (ref 65–99)
HCT VFR BLD AUTO: 43.6 % (ref 37.5–51)
HGB BLD-MCNC: 13.2 G/DL (ref 13–17.7)
INR PPP: 1.79 (ref 0.9–1.1)
LYMPHOCYTES # BLD AUTO: 0.63 10*3/MM3 (ref 0.7–3.1)
LYMPHOCYTES NFR BLD AUTO: 6.4 % (ref 19.6–45.3)
MCH RBC QN AUTO: 25.3 PG (ref 26.6–33)
MCHC RBC AUTO-ENTMCNC: 30.3 G/DL (ref 31.5–35.7)
MCV RBC AUTO: 83.5 FL (ref 79–97)
MONOCYTES # BLD AUTO: 0.62 10*3/MM3 (ref 0.1–0.9)
MONOCYTES NFR BLD AUTO: 6.3 % (ref 5–12)
NEUTROPHILS NFR BLD AUTO: 8.45 10*3/MM3 (ref 1.7–7)
NEUTROPHILS NFR BLD AUTO: 85.8 % (ref 42.7–76)
PHOSPHATE SERPL-MCNC: 2.8 MG/DL (ref 2.5–4.5)
PLATELET # BLD AUTO: 177 10*3/MM3 (ref 140–450)
PMV BLD AUTO: 11.3 FL (ref 6–12)
POTASSIUM SERPL-SCNC: 4.3 MMOL/L (ref 3.5–5.2)
PROTHROMBIN TIME: 20.8 SECONDS (ref 11.7–14.2)
QT INTERVAL: 411 MS
RBC # BLD AUTO: 5.22 10*6/MM3 (ref 4.14–5.8)
SODIUM SERPL-SCNC: 139 MMOL/L (ref 136–145)
WBC NRBC COR # BLD: 9.85 10*3/MM3 (ref 3.4–10.8)

## 2022-02-17 PROCEDURE — 80069 RENAL FUNCTION PANEL: CPT | Performed by: INTERNAL MEDICINE

## 2022-02-17 PROCEDURE — 99232 SBSQ HOSP IP/OBS MODERATE 35: CPT | Performed by: NURSE PRACTITIONER

## 2022-02-17 PROCEDURE — 85730 THROMBOPLASTIN TIME PARTIAL: CPT | Performed by: NURSE PRACTITIONER

## 2022-02-17 PROCEDURE — 97110 THERAPEUTIC EXERCISES: CPT

## 2022-02-17 PROCEDURE — 99222 1ST HOSP IP/OBS MODERATE 55: CPT | Performed by: NURSE PRACTITIONER

## 2022-02-17 PROCEDURE — 63710000001 INSULIN LISPRO (HUMAN) PER 5 UNITS: Performed by: NURSE PRACTITIONER

## 2022-02-17 PROCEDURE — 93010 ELECTROCARDIOGRAM REPORT: CPT | Performed by: INTERNAL MEDICINE

## 2022-02-17 PROCEDURE — 82962 GLUCOSE BLOOD TEST: CPT

## 2022-02-17 PROCEDURE — 85610 PROTHROMBIN TIME: CPT | Performed by: NURSE PRACTITIONER

## 2022-02-17 PROCEDURE — 97116 GAIT TRAINING THERAPY: CPT

## 2022-02-17 PROCEDURE — 85025 COMPLETE CBC W/AUTO DIFF WBC: CPT | Performed by: NURSE PRACTITIONER

## 2022-02-17 PROCEDURE — 25010000002 ONDANSETRON PER 1 MG: Performed by: NURSE PRACTITIONER

## 2022-02-17 PROCEDURE — 93005 ELECTROCARDIOGRAM TRACING: CPT | Performed by: NURSE PRACTITIONER

## 2022-02-17 RX ORDER — HEPARIN SOD,PORCINE/0.9 % NACL 25000/250
12 INTRAVENOUS SOLUTION INTRAVENOUS
Status: DISCONTINUED | OUTPATIENT
Start: 2022-02-17 | End: 2022-02-19

## 2022-02-17 RX ORDER — SODIUM BICARBONATE 650 MG/1
1300 TABLET ORAL 3 TIMES DAILY
Status: DISCONTINUED | OUTPATIENT
Start: 2022-02-17 | End: 2022-03-02 | Stop reason: HOSPADM

## 2022-02-17 RX ADMIN — INSULIN LISPRO 2 UNITS: 100 INJECTION, SOLUTION INTRAVENOUS; SUBCUTANEOUS at 11:26

## 2022-02-17 RX ADMIN — SODIUM CHLORIDE, PRESERVATIVE FREE 10 ML: 5 INJECTION INTRAVENOUS at 20:43

## 2022-02-17 RX ADMIN — WARFARIN 5 MG: 5 TABLET ORAL at 17:08

## 2022-02-17 RX ADMIN — FAMOTIDINE 20 MG: 20 TABLET, FILM COATED ORAL at 17:08

## 2022-02-17 RX ADMIN — SODIUM CHLORIDE 100 ML/HR: 9 INJECTION, SOLUTION INTRAVENOUS at 02:28

## 2022-02-17 RX ADMIN — INSULIN LISPRO 2 UNITS: 100 INJECTION, SOLUTION INTRAVENOUS; SUBCUTANEOUS at 07:11

## 2022-02-17 RX ADMIN — HEPARIN SODIUM 12 UNITS/KG/HR: 5000 INJECTION INTRAVENOUS; SUBCUTANEOUS at 23:16

## 2022-02-17 RX ADMIN — VANCOMYCIN HYDROCHLORIDE 125 MG: 125 CAPSULE ORAL at 17:08

## 2022-02-17 RX ADMIN — SODIUM BICARBONATE 1300 MG: 650 TABLET ORAL at 17:08

## 2022-02-17 RX ADMIN — SODIUM CHLORIDE, PRESERVATIVE FREE 10 ML: 5 INJECTION INTRAVENOUS at 09:26

## 2022-02-17 RX ADMIN — ONDANSETRON 4 MG: 2 INJECTION INTRAMUSCULAR; INTRAVENOUS at 06:54

## 2022-02-17 RX ADMIN — VANCOMYCIN HYDROCHLORIDE 125 MG: 125 CAPSULE ORAL at 00:38

## 2022-02-17 RX ADMIN — VANCOMYCIN HYDROCHLORIDE 125 MG: 125 CAPSULE ORAL at 23:00

## 2022-02-17 RX ADMIN — SODIUM BICARBONATE 1300 MG: 650 TABLET ORAL at 20:42

## 2022-02-17 RX ADMIN — VANCOMYCIN HYDROCHLORIDE 125 MG: 125 CAPSULE ORAL at 11:26

## 2022-02-17 RX ADMIN — GUAIFENESIN 600 MG: 600 TABLET, EXTENDED RELEASE ORAL at 17:08

## 2022-02-17 RX ADMIN — SODIUM CHLORIDE 75 ML/HR: 9 INJECTION, SOLUTION INTRAVENOUS at 13:00

## 2022-02-17 RX ADMIN — SODIUM BICARBONATE 1300 MG: 650 TABLET ORAL at 13:00

## 2022-02-18 LAB
ALBUMIN SERPL-MCNC: 1.9 G/DL (ref 3.5–5.2)
ANION GAP SERPL CALCULATED.3IONS-SCNC: 8 MMOL/L (ref 5–15)
APTT PPP: 131.8 SECONDS (ref 22.7–35.4)
APTT PPP: 57.2 SECONDS (ref 22.7–35.4)
APTT PPP: 58.7 SECONDS (ref 22.7–35.4)
BASOPHILS # BLD AUTO: 0.04 10*3/MM3 (ref 0–0.2)
BASOPHILS NFR BLD AUTO: 0.6 % (ref 0–1.5)
BUN SERPL-MCNC: 22 MG/DL (ref 8–23)
BUN/CREAT SERPL: 15.7 (ref 7–25)
CALCIUM SPEC-SCNC: 7.2 MG/DL (ref 8.6–10.5)
CHLORIDE SERPL-SCNC: 113 MMOL/L (ref 98–107)
CO2 SERPL-SCNC: 18 MMOL/L (ref 22–29)
CREAT SERPL-MCNC: 1.4 MG/DL (ref 0.76–1.27)
DEPRECATED RDW RBC AUTO: 44.6 FL (ref 37–54)
DIGOXIN SERPL-MCNC: 1 NG/ML (ref 0.6–1.2)
EOSINOPHIL # BLD AUTO: 0.16 10*3/MM3 (ref 0–0.4)
EOSINOPHIL NFR BLD AUTO: 2.3 % (ref 0.3–6.2)
ERYTHROCYTE [DISTWIDTH] IN BLOOD BY AUTOMATED COUNT: 15 % (ref 12.3–15.4)
GFR SERPL CREATININE-BSD FRML MDRD: 48 ML/MIN/1.73
GLUCOSE BLDC GLUCOMTR-MCNC: 115 MG/DL (ref 70–130)
GLUCOSE BLDC GLUCOMTR-MCNC: 130 MG/DL (ref 70–130)
GLUCOSE BLDC GLUCOMTR-MCNC: 133 MG/DL (ref 70–130)
GLUCOSE BLDC GLUCOMTR-MCNC: 156 MG/DL (ref 70–130)
GLUCOSE SERPL-MCNC: 114 MG/DL (ref 65–99)
HCT VFR BLD AUTO: 38.6 % (ref 37.5–51)
HGB BLD-MCNC: 12.1 G/DL (ref 13–17.7)
IMM GRANULOCYTES # BLD AUTO: 0.02 10*3/MM3 (ref 0–0.05)
IMM GRANULOCYTES NFR BLD AUTO: 0.3 % (ref 0–0.5)
INR PPP: 2.65 (ref 0.9–1.1)
LYMPHOCYTES # BLD AUTO: 0.75 10*3/MM3 (ref 0.7–3.1)
LYMPHOCYTES NFR BLD AUTO: 10.8 % (ref 19.6–45.3)
MCH RBC QN AUTO: 26 PG (ref 26.6–33)
MCHC RBC AUTO-ENTMCNC: 31.3 G/DL (ref 31.5–35.7)
MCV RBC AUTO: 82.8 FL (ref 79–97)
MONOCYTES # BLD AUTO: 0.6 10*3/MM3 (ref 0.1–0.9)
MONOCYTES NFR BLD AUTO: 8.6 % (ref 5–12)
NEUTROPHILS NFR BLD AUTO: 5.39 10*3/MM3 (ref 1.7–7)
NEUTROPHILS NFR BLD AUTO: 77.4 % (ref 42.7–76)
NRBC BLD AUTO-RTO: 0 /100 WBC (ref 0–0.2)
PHOSPHATE SERPL-MCNC: 2.3 MG/DL (ref 2.5–4.5)
PLATELET # BLD AUTO: 164 10*3/MM3 (ref 140–450)
PMV BLD AUTO: 11.5 FL (ref 6–12)
POTASSIUM SERPL-SCNC: 3.7 MMOL/L (ref 3.5–5.2)
PROTHROMBIN TIME: 28.4 SECONDS (ref 11.7–14.2)
RBC # BLD AUTO: 4.66 10*6/MM3 (ref 4.14–5.8)
SODIUM SERPL-SCNC: 139 MMOL/L (ref 136–145)
WBC NRBC COR # BLD: 6.96 10*3/MM3 (ref 3.4–10.8)

## 2022-02-18 PROCEDURE — 25010000002 HEPARIN (PORCINE) PER 1000 UNITS: Performed by: NURSE PRACTITIONER

## 2022-02-18 PROCEDURE — 82962 GLUCOSE BLOOD TEST: CPT

## 2022-02-18 PROCEDURE — 85730 THROMBOPLASTIN TIME PARTIAL: CPT | Performed by: HOSPITALIST

## 2022-02-18 PROCEDURE — 85610 PROTHROMBIN TIME: CPT | Performed by: NURSE PRACTITIONER

## 2022-02-18 PROCEDURE — 80069 RENAL FUNCTION PANEL: CPT | Performed by: INTERNAL MEDICINE

## 2022-02-18 PROCEDURE — 63710000001 INSULIN LISPRO (HUMAN) PER 5 UNITS: Performed by: NURSE PRACTITIONER

## 2022-02-18 PROCEDURE — 84484 ASSAY OF TROPONIN QUANT: CPT | Performed by: INTERNAL MEDICINE

## 2022-02-18 PROCEDURE — 85730 THROMBOPLASTIN TIME PARTIAL: CPT | Performed by: NURSE PRACTITIONER

## 2022-02-18 PROCEDURE — 80162 ASSAY OF DIGOXIN TOTAL: CPT | Performed by: NURSE PRACTITIONER

## 2022-02-18 PROCEDURE — 97530 THERAPEUTIC ACTIVITIES: CPT

## 2022-02-18 PROCEDURE — 85025 COMPLETE CBC W/AUTO DIFF WBC: CPT | Performed by: NURSE PRACTITIONER

## 2022-02-18 PROCEDURE — 99232 SBSQ HOSP IP/OBS MODERATE 35: CPT | Performed by: INTERNAL MEDICINE

## 2022-02-18 RX ORDER — METOPROLOL SUCCINATE 25 MG/1
12.5 TABLET, EXTENDED RELEASE ORAL DAILY
Status: DISCONTINUED | OUTPATIENT
Start: 2022-02-19 | End: 2022-03-02 | Stop reason: HOSPADM

## 2022-02-18 RX ADMIN — SODIUM BICARBONATE 1300 MG: 650 TABLET ORAL at 18:15

## 2022-02-18 RX ADMIN — POTASSIUM PHOSPHATE, MONOBASIC AND POTASSIUM PHOSPHATE, DIBASIC 15 MMOL: 224; 236 INJECTION, SOLUTION, CONCENTRATE INTRAVENOUS at 20:41

## 2022-02-18 RX ADMIN — SODIUM CHLORIDE, PRESERVATIVE FREE 10 ML: 5 INJECTION INTRAVENOUS at 08:12

## 2022-02-18 RX ADMIN — HEPARIN SODIUM 2000 UNITS: 5000 INJECTION, SOLUTION INTRAVENOUS; SUBCUTANEOUS at 13:06

## 2022-02-18 RX ADMIN — SODIUM BICARBONATE 1300 MG: 650 TABLET ORAL at 20:42

## 2022-02-18 RX ADMIN — VANCOMYCIN HYDROCHLORIDE 125 MG: 125 CAPSULE ORAL at 05:33

## 2022-02-18 RX ADMIN — VANCOMYCIN HYDROCHLORIDE 125 MG: 125 CAPSULE ORAL at 11:12

## 2022-02-18 RX ADMIN — VANCOMYCIN HYDROCHLORIDE 125 MG: 125 CAPSULE ORAL at 18:15

## 2022-02-18 RX ADMIN — HEPARIN SODIUM 2000 UNITS: 5000 INJECTION, SOLUTION INTRAVENOUS; SUBCUTANEOUS at 05:34

## 2022-02-18 RX ADMIN — SODIUM CHLORIDE 75 ML/HR: 9 INJECTION, SOLUTION INTRAVENOUS at 01:50

## 2022-02-18 RX ADMIN — FAMOTIDINE 20 MG: 20 TABLET, FILM COATED ORAL at 18:14

## 2022-02-18 RX ADMIN — WARFARIN 7.5 MG: 7.5 TABLET ORAL at 20:41

## 2022-02-18 RX ADMIN — HEPARIN SODIUM 12 UNITS/KG/HR: 5000 INJECTION INTRAVENOUS; SUBCUTANEOUS at 01:34

## 2022-02-18 RX ADMIN — GUAIFENESIN 600 MG: 600 TABLET, EXTENDED RELEASE ORAL at 18:15

## 2022-02-18 RX ADMIN — ATORVASTATIN CALCIUM 40 MG: 20 TABLET, FILM COATED ORAL at 05:33

## 2022-02-18 RX ADMIN — DIGOXIN 125 MCG: 125 TABLET ORAL at 05:34

## 2022-02-18 RX ADMIN — SODIUM CHLORIDE, PRESERVATIVE FREE 10 ML: 5 INJECTION INTRAVENOUS at 20:41

## 2022-02-18 RX ADMIN — SODIUM BICARBONATE 1300 MG: 650 TABLET ORAL at 08:09

## 2022-02-18 RX ADMIN — GUAIFENESIN 600 MG: 600 TABLET, EXTENDED RELEASE ORAL at 05:33

## 2022-02-18 RX ADMIN — VANCOMYCIN HYDROCHLORIDE 125 MG: 125 CAPSULE ORAL at 23:27

## 2022-02-18 RX ADMIN — FAMOTIDINE 20 MG: 20 TABLET, FILM COATED ORAL at 05:33

## 2022-02-18 RX ADMIN — METOPROLOL SUCCINATE 25 MG: 25 TABLET, EXTENDED RELEASE ORAL at 05:33

## 2022-02-18 RX ADMIN — INSULIN LISPRO 2 UNITS: 100 INJECTION, SOLUTION INTRAVENOUS; SUBCUTANEOUS at 11:12

## 2022-02-18 RX ADMIN — ASPIRIN 81 MG: 81 TABLET, COATED ORAL at 05:33

## 2022-02-19 LAB
ALBUMIN SERPL-MCNC: 2.1 G/DL (ref 3.5–5.2)
ANION GAP SERPL CALCULATED.3IONS-SCNC: 7.4 MMOL/L (ref 5–15)
APTT PPP: 55.4 SECONDS (ref 22.7–35.4)
BASOPHILS # BLD AUTO: 0.07 10*3/MM3 (ref 0–0.2)
BASOPHILS NFR BLD AUTO: 0.8 % (ref 0–1.5)
BUN SERPL-MCNC: 20 MG/DL (ref 8–23)
BUN/CREAT SERPL: 14.1 (ref 7–25)
CALCIUM SPEC-SCNC: 8.2 MG/DL (ref 8.6–10.5)
CHLORIDE SERPL-SCNC: 111 MMOL/L (ref 98–107)
CO2 SERPL-SCNC: 22.6 MMOL/L (ref 22–29)
CREAT SERPL-MCNC: 1.42 MG/DL (ref 0.76–1.27)
DEPRECATED RDW RBC AUTO: 43.2 FL (ref 37–54)
EOSINOPHIL # BLD AUTO: 0.23 10*3/MM3 (ref 0–0.4)
EOSINOPHIL NFR BLD AUTO: 2.6 % (ref 0.3–6.2)
ERYTHROCYTE [DISTWIDTH] IN BLOOD BY AUTOMATED COUNT: 14.8 % (ref 12.3–15.4)
GFR SERPL CREATININE-BSD FRML MDRD: 47 ML/MIN/1.73
GLUCOSE BLDC GLUCOMTR-MCNC: 101 MG/DL (ref 70–130)
GLUCOSE BLDC GLUCOMTR-MCNC: 162 MG/DL (ref 70–130)
GLUCOSE BLDC GLUCOMTR-MCNC: 164 MG/DL (ref 70–130)
GLUCOSE BLDC GLUCOMTR-MCNC: 171 MG/DL (ref 70–130)
GLUCOSE SERPL-MCNC: 161 MG/DL (ref 65–99)
HCT VFR BLD AUTO: 40.7 % (ref 37.5–51)
HGB BLD-MCNC: 13.1 G/DL (ref 13–17.7)
IMM GRANULOCYTES # BLD AUTO: 0.04 10*3/MM3 (ref 0–0.05)
IMM GRANULOCYTES NFR BLD AUTO: 0.5 % (ref 0–0.5)
INR PPP: 2.83 (ref 0.9–1.1)
INR PPP: 2.94 (ref 0.9–1.1)
LYMPHOCYTES # BLD AUTO: 0.87 10*3/MM3 (ref 0.7–3.1)
LYMPHOCYTES NFR BLD AUTO: 10 % (ref 19.6–45.3)
MAGNESIUM SERPL-MCNC: 2.3 MG/DL (ref 1.6–2.4)
MCH RBC QN AUTO: 26.4 PG (ref 26.6–33)
MCHC RBC AUTO-ENTMCNC: 32.2 G/DL (ref 31.5–35.7)
MCV RBC AUTO: 81.9 FL (ref 79–97)
MONOCYTES # BLD AUTO: 0.64 10*3/MM3 (ref 0.1–0.9)
MONOCYTES NFR BLD AUTO: 7.4 % (ref 5–12)
NEUTROPHILS NFR BLD AUTO: 6.85 10*3/MM3 (ref 1.7–7)
NEUTROPHILS NFR BLD AUTO: 78.7 % (ref 42.7–76)
NRBC BLD AUTO-RTO: 0 /100 WBC (ref 0–0.2)
PHOSPHATE SERPL-MCNC: 2.5 MG/DL (ref 2.5–4.5)
PLATELET # BLD AUTO: 188 10*3/MM3 (ref 140–450)
PMV BLD AUTO: 11.8 FL (ref 6–12)
POTASSIUM SERPL-SCNC: 4 MMOL/L (ref 3.5–5.2)
PROTHROMBIN TIME: 29.9 SECONDS (ref 11.7–14.2)
PROTHROMBIN TIME: 30.8 SECONDS (ref 11.7–14.2)
RBC # BLD AUTO: 4.97 10*6/MM3 (ref 4.14–5.8)
SODIUM SERPL-SCNC: 141 MMOL/L (ref 136–145)
TROPONIN T SERPL-MCNC: 0.03 NG/ML (ref 0–0.03)
WBC NRBC COR # BLD: 8.7 10*3/MM3 (ref 3.4–10.8)

## 2022-02-19 PROCEDURE — 83735 ASSAY OF MAGNESIUM: CPT | Performed by: INTERNAL MEDICINE

## 2022-02-19 PROCEDURE — 85025 COMPLETE CBC W/AUTO DIFF WBC: CPT | Performed by: NURSE PRACTITIONER

## 2022-02-19 PROCEDURE — 85730 THROMBOPLASTIN TIME PARTIAL: CPT | Performed by: HOSPITALIST

## 2022-02-19 PROCEDURE — 85610 PROTHROMBIN TIME: CPT | Performed by: HOSPITALIST

## 2022-02-19 PROCEDURE — 63710000001 INSULIN LISPRO (HUMAN) PER 5 UNITS: Performed by: NURSE PRACTITIONER

## 2022-02-19 PROCEDURE — 80069 RENAL FUNCTION PANEL: CPT | Performed by: INTERNAL MEDICINE

## 2022-02-19 PROCEDURE — 97110 THERAPEUTIC EXERCISES: CPT

## 2022-02-19 PROCEDURE — 82962 GLUCOSE BLOOD TEST: CPT

## 2022-02-19 PROCEDURE — 99232 SBSQ HOSP IP/OBS MODERATE 35: CPT | Performed by: NURSE PRACTITIONER

## 2022-02-19 PROCEDURE — 85610 PROTHROMBIN TIME: CPT | Performed by: NURSE PRACTITIONER

## 2022-02-19 PROCEDURE — 25010000002 HEPARIN (PORCINE) PER 1000 UNITS: Performed by: NURSE PRACTITIONER

## 2022-02-19 PROCEDURE — 25010000002 HEPARIN (PORCINE) PER 1000 UNITS: Performed by: HOSPITALIST

## 2022-02-19 RX ORDER — HEPARIN SOD,PORCINE/0.9 % NACL 25000/250
12 INTRAVENOUS SOLUTION INTRAVENOUS
Status: DISCONTINUED | OUTPATIENT
Start: 2022-02-19 | End: 2022-02-20

## 2022-02-19 RX ADMIN — SODIUM CHLORIDE, PRESERVATIVE FREE 10 ML: 5 INJECTION INTRAVENOUS at 21:03

## 2022-02-19 RX ADMIN — DIGOXIN 125 MCG: 125 TABLET ORAL at 06:37

## 2022-02-19 RX ADMIN — HEPARIN SODIUM 2000 UNITS: 5000 INJECTION, SOLUTION INTRAVENOUS; SUBCUTANEOUS at 09:14

## 2022-02-19 RX ADMIN — FERROUS SULFATE TAB 325 MG (65 MG ELEMENTAL FE) 325 MG: 325 (65 FE) TAB at 08:27

## 2022-02-19 RX ADMIN — WARFARIN 5 MG: 5 TABLET ORAL at 17:58

## 2022-02-19 RX ADMIN — VANCOMYCIN HYDROCHLORIDE 125 MG: 125 CAPSULE ORAL at 17:58

## 2022-02-19 RX ADMIN — INSULIN LISPRO 2 UNITS: 100 INJECTION, SOLUTION INTRAVENOUS; SUBCUTANEOUS at 17:59

## 2022-02-19 RX ADMIN — HEPARIN SODIUM 12 UNITS/KG/HR: 5000 INJECTION INTRAVENOUS; SUBCUTANEOUS at 18:51

## 2022-02-19 RX ADMIN — GUAIFENESIN 600 MG: 600 TABLET, EXTENDED RELEASE ORAL at 17:58

## 2022-02-19 RX ADMIN — VANCOMYCIN HYDROCHLORIDE 125 MG: 125 CAPSULE ORAL at 23:34

## 2022-02-19 RX ADMIN — SODIUM BICARBONATE 1300 MG: 650 TABLET ORAL at 08:27

## 2022-02-19 RX ADMIN — INSULIN LISPRO 2 UNITS: 100 INJECTION, SOLUTION INTRAVENOUS; SUBCUTANEOUS at 12:10

## 2022-02-19 RX ADMIN — GUAIFENESIN 600 MG: 600 TABLET, EXTENDED RELEASE ORAL at 06:37

## 2022-02-19 RX ADMIN — ATORVASTATIN CALCIUM 40 MG: 20 TABLET, FILM COATED ORAL at 06:37

## 2022-02-19 RX ADMIN — FAMOTIDINE 20 MG: 20 TABLET, FILM COATED ORAL at 18:45

## 2022-02-19 RX ADMIN — SODIUM CHLORIDE, PRESERVATIVE FREE 10 ML: 5 INJECTION INTRAVENOUS at 08:27

## 2022-02-19 RX ADMIN — ASPIRIN 81 MG: 81 TABLET, COATED ORAL at 06:40

## 2022-02-19 RX ADMIN — FAMOTIDINE 20 MG: 20 TABLET, FILM COATED ORAL at 06:37

## 2022-02-19 RX ADMIN — VANCOMYCIN HYDROCHLORIDE 125 MG: 125 CAPSULE ORAL at 12:10

## 2022-02-19 RX ADMIN — VANCOMYCIN HYDROCHLORIDE 125 MG: 125 CAPSULE ORAL at 06:37

## 2022-02-19 RX ADMIN — METOPROLOL SUCCINATE 12.5 MG: 25 TABLET, EXTENDED RELEASE ORAL at 06:37

## 2022-02-19 RX ADMIN — SODIUM BICARBONATE 1300 MG: 650 TABLET ORAL at 21:03

## 2022-02-19 RX ADMIN — SODIUM BICARBONATE 1300 MG: 650 TABLET ORAL at 17:58

## 2022-02-20 LAB
ALBUMIN SERPL-MCNC: 1.8 G/DL (ref 3.5–5.2)
ANION GAP SERPL CALCULATED.3IONS-SCNC: 5.6 MMOL/L (ref 5–15)
APTT PPP: 92.4 SECONDS (ref 22.7–35.4)
BASOPHILS # BLD AUTO: 0.04 10*3/MM3 (ref 0–0.2)
BASOPHILS NFR BLD AUTO: 0.6 % (ref 0–1.5)
BUN SERPL-MCNC: 18 MG/DL (ref 8–23)
BUN/CREAT SERPL: 13.1 (ref 7–25)
CALCIUM SPEC-SCNC: 7.8 MG/DL (ref 8.6–10.5)
CHLORIDE SERPL-SCNC: 110 MMOL/L (ref 98–107)
CO2 SERPL-SCNC: 22.4 MMOL/L (ref 22–29)
CREAT SERPL-MCNC: 1.37 MG/DL (ref 0.76–1.27)
DEPRECATED RDW RBC AUTO: 43.7 FL (ref 37–54)
EOSINOPHIL # BLD AUTO: 0.19 10*3/MM3 (ref 0–0.4)
EOSINOPHIL NFR BLD AUTO: 3 % (ref 0.3–6.2)
ERYTHROCYTE [DISTWIDTH] IN BLOOD BY AUTOMATED COUNT: 15 % (ref 12.3–15.4)
GFR SERPL CREATININE-BSD FRML MDRD: 50 ML/MIN/1.73
GLUCOSE BLDC GLUCOMTR-MCNC: 109 MG/DL (ref 70–130)
GLUCOSE BLDC GLUCOMTR-MCNC: 125 MG/DL (ref 70–130)
GLUCOSE BLDC GLUCOMTR-MCNC: 150 MG/DL (ref 70–130)
GLUCOSE BLDC GLUCOMTR-MCNC: 175 MG/DL (ref 70–130)
GLUCOSE SERPL-MCNC: 189 MG/DL (ref 65–99)
HCT VFR BLD AUTO: 36.1 % (ref 37.5–51)
HGB BLD-MCNC: 11.5 G/DL (ref 13–17.7)
IMM GRANULOCYTES # BLD AUTO: 0.03 10*3/MM3 (ref 0–0.05)
IMM GRANULOCYTES NFR BLD AUTO: 0.5 % (ref 0–0.5)
INR PPP: 3.08 (ref 0.9–1.1)
LYMPHOCYTES # BLD AUTO: 0.71 10*3/MM3 (ref 0.7–3.1)
LYMPHOCYTES NFR BLD AUTO: 11.3 % (ref 19.6–45.3)
MCH RBC QN AUTO: 25.9 PG (ref 26.6–33)
MCHC RBC AUTO-ENTMCNC: 31.9 G/DL (ref 31.5–35.7)
MCV RBC AUTO: 81.3 FL (ref 79–97)
MONOCYTES # BLD AUTO: 0.53 10*3/MM3 (ref 0.1–0.9)
MONOCYTES NFR BLD AUTO: 8.5 % (ref 5–12)
NEUTROPHILS NFR BLD AUTO: 4.77 10*3/MM3 (ref 1.7–7)
NEUTROPHILS NFR BLD AUTO: 76.1 % (ref 42.7–76)
NRBC BLD AUTO-RTO: 0 /100 WBC (ref 0–0.2)
PHOSPHATE SERPL-MCNC: 2.3 MG/DL (ref 2.5–4.5)
PLATELET # BLD AUTO: 157 10*3/MM3 (ref 140–450)
PMV BLD AUTO: 11.2 FL (ref 6–12)
POTASSIUM SERPL-SCNC: 4.3 MMOL/L (ref 3.5–5.2)
PROTHROMBIN TIME: 32 SECONDS (ref 11.7–14.2)
RBC # BLD AUTO: 4.44 10*6/MM3 (ref 4.14–5.8)
SODIUM SERPL-SCNC: 138 MMOL/L (ref 136–145)
WBC NRBC COR # BLD: 6.27 10*3/MM3 (ref 3.4–10.8)

## 2022-02-20 PROCEDURE — 85610 PROTHROMBIN TIME: CPT | Performed by: NURSE PRACTITIONER

## 2022-02-20 PROCEDURE — 80069 RENAL FUNCTION PANEL: CPT | Performed by: INTERNAL MEDICINE

## 2022-02-20 PROCEDURE — 82962 GLUCOSE BLOOD TEST: CPT

## 2022-02-20 PROCEDURE — 97116 GAIT TRAINING THERAPY: CPT | Performed by: PHYSICAL THERAPIST

## 2022-02-20 PROCEDURE — 85025 COMPLETE CBC W/AUTO DIFF WBC: CPT | Performed by: NURSE PRACTITIONER

## 2022-02-20 PROCEDURE — 85730 THROMBOPLASTIN TIME PARTIAL: CPT | Performed by: HOSPITALIST

## 2022-02-20 RX ORDER — SACCHAROMYCES BOULARDII 250 MG
250 CAPSULE ORAL 2 TIMES DAILY
Status: DISCONTINUED | OUTPATIENT
Start: 2022-02-20 | End: 2022-03-02 | Stop reason: HOSPADM

## 2022-02-20 RX ORDER — CALCIUM POLYCARBOPHIL 625 MG
1250 TABLET ORAL DAILY
Status: DISCONTINUED | OUTPATIENT
Start: 2022-02-20 | End: 2022-02-22

## 2022-02-20 RX ADMIN — METOPROLOL SUCCINATE 12.5 MG: 25 TABLET, EXTENDED RELEASE ORAL at 05:53

## 2022-02-20 RX ADMIN — WARFARIN 5 MG: 5 TABLET ORAL at 18:53

## 2022-02-20 RX ADMIN — SODIUM BICARBONATE 1300 MG: 650 TABLET ORAL at 20:44

## 2022-02-20 RX ADMIN — SODIUM CHLORIDE, PRESERVATIVE FREE 10 ML: 5 INJECTION INTRAVENOUS at 20:44

## 2022-02-20 RX ADMIN — DIGOXIN 125 MCG: 125 TABLET ORAL at 05:53

## 2022-02-20 RX ADMIN — GUAIFENESIN 600 MG: 600 TABLET, EXTENDED RELEASE ORAL at 05:52

## 2022-02-20 RX ADMIN — ASPIRIN 81 MG: 81 TABLET, COATED ORAL at 05:53

## 2022-02-20 RX ADMIN — SODIUM BICARBONATE 1300 MG: 650 TABLET ORAL at 10:43

## 2022-02-20 RX ADMIN — GUAIFENESIN 600 MG: 600 TABLET, EXTENDED RELEASE ORAL at 18:24

## 2022-02-20 RX ADMIN — ATORVASTATIN CALCIUM 40 MG: 20 TABLET, FILM COATED ORAL at 05:53

## 2022-02-20 RX ADMIN — SODIUM BICARBONATE 1300 MG: 650 TABLET ORAL at 18:24

## 2022-02-20 RX ADMIN — FAMOTIDINE 20 MG: 20 TABLET, FILM COATED ORAL at 05:53

## 2022-02-20 RX ADMIN — VANCOMYCIN HYDROCHLORIDE 125 MG: 125 CAPSULE ORAL at 18:24

## 2022-02-20 RX ADMIN — VANCOMYCIN HYDROCHLORIDE 125 MG: 125 CAPSULE ORAL at 15:17

## 2022-02-20 RX ADMIN — CALCIUM POLYCARBOPHIL 1250 MG: 625 TABLET, FILM COATED ORAL at 18:25

## 2022-02-20 RX ADMIN — Medication 250 MG: at 20:44

## 2022-02-20 RX ADMIN — VANCOMYCIN HYDROCHLORIDE 125 MG: 125 CAPSULE ORAL at 23:15

## 2022-02-20 RX ADMIN — FAMOTIDINE 20 MG: 20 TABLET, FILM COATED ORAL at 18:23

## 2022-02-20 RX ADMIN — SODIUM CHLORIDE, PRESERVATIVE FREE 10 ML: 5 INJECTION INTRAVENOUS at 10:44

## 2022-02-20 RX ADMIN — VANCOMYCIN HYDROCHLORIDE 125 MG: 125 CAPSULE ORAL at 05:58

## 2022-02-21 ENCOUNTER — TELEPHONE (OUTPATIENT)
Dept: FAMILY MEDICINE CLINIC | Facility: CLINIC | Age: 87
End: 2022-02-21

## 2022-02-21 LAB
ALBUMIN SERPL-MCNC: 1.8 G/DL (ref 3.5–5.2)
ANION GAP SERPL CALCULATED.3IONS-SCNC: 8.2 MMOL/L (ref 5–15)
APTT PPP: 76.8 SECONDS (ref 22.7–35.4)
BASOPHILS # BLD AUTO: 0.04 10*3/MM3 (ref 0–0.2)
BASOPHILS NFR BLD AUTO: 0.7 % (ref 0–1.5)
BUN SERPL-MCNC: 15 MG/DL (ref 8–23)
BUN/CREAT SERPL: 13.2 (ref 7–25)
CALCIUM SPEC-SCNC: 7.8 MG/DL (ref 8.6–10.5)
CHLORIDE SERPL-SCNC: 110 MMOL/L (ref 98–107)
CO2 SERPL-SCNC: 20.8 MMOL/L (ref 22–29)
CREAT SERPL-MCNC: 1.14 MG/DL (ref 0.76–1.27)
DEPRECATED RDW RBC AUTO: 44.7 FL (ref 37–54)
EOSINOPHIL # BLD AUTO: 0.21 10*3/MM3 (ref 0–0.4)
EOSINOPHIL NFR BLD AUTO: 3.4 % (ref 0.3–6.2)
ERYTHROCYTE [DISTWIDTH] IN BLOOD BY AUTOMATED COUNT: 15 % (ref 12.3–15.4)
GFR SERPL CREATININE-BSD FRML MDRD: 61 ML/MIN/1.73
GLUCOSE BLDC GLUCOMTR-MCNC: 111 MG/DL (ref 70–130)
GLUCOSE BLDC GLUCOMTR-MCNC: 154 MG/DL (ref 70–130)
GLUCOSE BLDC GLUCOMTR-MCNC: 174 MG/DL (ref 70–130)
GLUCOSE BLDC GLUCOMTR-MCNC: 184 MG/DL (ref 70–130)
GLUCOSE SERPL-MCNC: 114 MG/DL (ref 65–99)
HCT VFR BLD AUTO: 37.3 % (ref 37.5–51)
HGB BLD-MCNC: 11.5 G/DL (ref 13–17.7)
IMM GRANULOCYTES # BLD AUTO: 0.02 10*3/MM3 (ref 0–0.05)
IMM GRANULOCYTES NFR BLD AUTO: 0.3 % (ref 0–0.5)
INR PPP: 2.97 (ref 0.9–1.1)
LYMPHOCYTES # BLD AUTO: 0.71 10*3/MM3 (ref 0.7–3.1)
LYMPHOCYTES NFR BLD AUTO: 11.6 % (ref 19.6–45.3)
MCH RBC QN AUTO: 25.4 PG (ref 26.6–33)
MCHC RBC AUTO-ENTMCNC: 30.8 G/DL (ref 31.5–35.7)
MCV RBC AUTO: 82.3 FL (ref 79–97)
MONOCYTES # BLD AUTO: 0.52 10*3/MM3 (ref 0.1–0.9)
MONOCYTES NFR BLD AUTO: 8.5 % (ref 5–12)
NEUTROPHILS NFR BLD AUTO: 4.62 10*3/MM3 (ref 1.7–7)
NEUTROPHILS NFR BLD AUTO: 75.5 % (ref 42.7–76)
NRBC BLD AUTO-RTO: 0 /100 WBC (ref 0–0.2)
PHOSPHATE SERPL-MCNC: 2.5 MG/DL (ref 2.5–4.5)
PLATELET # BLD AUTO: 167 10*3/MM3 (ref 140–450)
PMV BLD AUTO: 11.4 FL (ref 6–12)
POTASSIUM SERPL-SCNC: 4 MMOL/L (ref 3.5–5.2)
PROTHROMBIN TIME: 31.1 SECONDS (ref 11.7–14.2)
RBC # BLD AUTO: 4.53 10*6/MM3 (ref 4.14–5.8)
SODIUM SERPL-SCNC: 139 MMOL/L (ref 136–145)
WBC NRBC COR # BLD: 6.12 10*3/MM3 (ref 3.4–10.8)

## 2022-02-21 PROCEDURE — 80069 RENAL FUNCTION PANEL: CPT | Performed by: INTERNAL MEDICINE

## 2022-02-21 PROCEDURE — 85730 THROMBOPLASTIN TIME PARTIAL: CPT | Performed by: NURSE PRACTITIONER

## 2022-02-21 PROCEDURE — 85610 PROTHROMBIN TIME: CPT | Performed by: NURSE PRACTITIONER

## 2022-02-21 PROCEDURE — 82962 GLUCOSE BLOOD TEST: CPT

## 2022-02-21 PROCEDURE — 85025 COMPLETE CBC W/AUTO DIFF WBC: CPT | Performed by: NURSE PRACTITIONER

## 2022-02-21 PROCEDURE — 63710000001 INSULIN LISPRO (HUMAN) PER 5 UNITS: Performed by: NURSE PRACTITIONER

## 2022-02-21 PROCEDURE — 99232 SBSQ HOSP IP/OBS MODERATE 35: CPT | Performed by: PHYSICIAN ASSISTANT

## 2022-02-21 RX ADMIN — ATORVASTATIN CALCIUM 40 MG: 20 TABLET, FILM COATED ORAL at 06:10

## 2022-02-21 RX ADMIN — INSULIN LISPRO 2 UNITS: 100 INJECTION, SOLUTION INTRAVENOUS; SUBCUTANEOUS at 17:20

## 2022-02-21 RX ADMIN — INSULIN LISPRO 2 UNITS: 100 INJECTION, SOLUTION INTRAVENOUS; SUBCUTANEOUS at 11:12

## 2022-02-21 RX ADMIN — FAMOTIDINE 20 MG: 20 TABLET, FILM COATED ORAL at 17:19

## 2022-02-21 RX ADMIN — CALCIUM POLYCARBOPHIL 1250 MG: 625 TABLET, FILM COATED ORAL at 09:08

## 2022-02-21 RX ADMIN — SODIUM CHLORIDE, PRESERVATIVE FREE 10 ML: 5 INJECTION INTRAVENOUS at 20:21

## 2022-02-21 RX ADMIN — VANCOMYCIN HYDROCHLORIDE 125 MG: 125 CAPSULE ORAL at 11:12

## 2022-02-21 RX ADMIN — Medication 250 MG: at 09:08

## 2022-02-21 RX ADMIN — Medication 250 MG: at 20:20

## 2022-02-21 RX ADMIN — FAMOTIDINE 20 MG: 20 TABLET, FILM COATED ORAL at 06:12

## 2022-02-21 RX ADMIN — FERROUS SULFATE TAB 325 MG (65 MG ELEMENTAL FE) 325 MG: 325 (65 FE) TAB at 09:08

## 2022-02-21 RX ADMIN — DIGOXIN 125 MCG: 125 TABLET ORAL at 06:09

## 2022-02-21 RX ADMIN — VANCOMYCIN HYDROCHLORIDE 125 MG: 125 CAPSULE ORAL at 17:19

## 2022-02-21 RX ADMIN — METOPROLOL SUCCINATE 12.5 MG: 25 TABLET, EXTENDED RELEASE ORAL at 06:12

## 2022-02-21 RX ADMIN — VANCOMYCIN HYDROCHLORIDE 125 MG: 125 CAPSULE ORAL at 06:09

## 2022-02-21 RX ADMIN — SODIUM BICARBONATE 1300 MG: 650 TABLET ORAL at 16:07

## 2022-02-21 RX ADMIN — SODIUM BICARBONATE 1300 MG: 650 TABLET ORAL at 20:20

## 2022-02-21 RX ADMIN — ASPIRIN 81 MG: 81 TABLET, COATED ORAL at 06:09

## 2022-02-21 RX ADMIN — GUAIFENESIN 600 MG: 600 TABLET, EXTENDED RELEASE ORAL at 06:11

## 2022-02-21 RX ADMIN — SODIUM BICARBONATE 1300 MG: 650 TABLET ORAL at 09:08

## 2022-02-21 RX ADMIN — SODIUM CHLORIDE, PRESERVATIVE FREE 10 ML: 5 INJECTION INTRAVENOUS at 09:09

## 2022-02-21 RX ADMIN — WARFARIN 7.5 MG: 7.5 TABLET ORAL at 17:19

## 2022-02-21 RX ADMIN — GUAIFENESIN 600 MG: 600 TABLET, EXTENDED RELEASE ORAL at 17:19

## 2022-02-21 NOTE — TELEPHONE ENCOUNTER
Caller: ALBERTA    Relationship to Patient: HOME HEALTH    Phone Number: [phone]    Reason for Call: ALBERTA FROM [facility] CALLED REQUESTING FOR AN ORDER FOR A HOME HEALTH EVAL ONCE PATIENT DISCHARGES BACK HOME.

## 2022-02-22 LAB
ALBUMIN SERPL-MCNC: 2.2 G/DL (ref 3.5–5.2)
ANION GAP SERPL CALCULATED.3IONS-SCNC: 6.5 MMOL/L (ref 5–15)
APTT PPP: 54.2 SECONDS (ref 22.7–35.4)
BASOPHILS # BLD AUTO: 0.04 10*3/MM3 (ref 0–0.2)
BASOPHILS NFR BLD AUTO: 0.6 % (ref 0–1.5)
BUN SERPL-MCNC: 16 MG/DL (ref 8–23)
BUN/CREAT SERPL: 12.6 (ref 7–25)
CALCIUM SPEC-SCNC: 8.2 MG/DL (ref 8.6–10.5)
CHLORIDE SERPL-SCNC: 107 MMOL/L (ref 98–107)
CO2 SERPL-SCNC: 25.5 MMOL/L (ref 22–29)
CREAT SERPL-MCNC: 1.27 MG/DL (ref 0.76–1.27)
DEPRECATED RDW RBC AUTO: 44.9 FL (ref 37–54)
EOSINOPHIL # BLD AUTO: 0.22 10*3/MM3 (ref 0–0.4)
EOSINOPHIL NFR BLD AUTO: 3.4 % (ref 0.3–6.2)
ERYTHROCYTE [DISTWIDTH] IN BLOOD BY AUTOMATED COUNT: 15.3 % (ref 12.3–15.4)
GFR SERPL CREATININE-BSD FRML MDRD: 54 ML/MIN/1.73
GLUCOSE BLDC GLUCOMTR-MCNC: 116 MG/DL (ref 70–130)
GLUCOSE BLDC GLUCOMTR-MCNC: 140 MG/DL (ref 70–130)
GLUCOSE BLDC GLUCOMTR-MCNC: 147 MG/DL (ref 70–130)
GLUCOSE BLDC GLUCOMTR-MCNC: 157 MG/DL (ref 70–130)
GLUCOSE SERPL-MCNC: 122 MG/DL (ref 65–99)
HCT VFR BLD AUTO: 38.1 % (ref 37.5–51)
HGB BLD-MCNC: 12.1 G/DL (ref 13–17.7)
IMM GRANULOCYTES # BLD AUTO: 0.02 10*3/MM3 (ref 0–0.05)
IMM GRANULOCYTES NFR BLD AUTO: 0.3 % (ref 0–0.5)
INR PPP: 2.88 (ref 0.9–1.1)
LYMPHOCYTES # BLD AUTO: 0.93 10*3/MM3 (ref 0.7–3.1)
LYMPHOCYTES NFR BLD AUTO: 14.5 % (ref 19.6–45.3)
MAGNESIUM SERPL-MCNC: 1.9 MG/DL (ref 1.6–2.4)
MCH RBC QN AUTO: 26.1 PG (ref 26.6–33)
MCHC RBC AUTO-ENTMCNC: 31.8 G/DL (ref 31.5–35.7)
MCV RBC AUTO: 82.3 FL (ref 79–97)
MONOCYTES # BLD AUTO: 0.55 10*3/MM3 (ref 0.1–0.9)
MONOCYTES NFR BLD AUTO: 8.6 % (ref 5–12)
NEUTROPHILS NFR BLD AUTO: 4.67 10*3/MM3 (ref 1.7–7)
NEUTROPHILS NFR BLD AUTO: 72.6 % (ref 42.7–76)
NRBC BLD AUTO-RTO: 0 /100 WBC (ref 0–0.2)
PHOSPHATE SERPL-MCNC: 2.7 MG/DL (ref 2.5–4.5)
PLATELET # BLD AUTO: 173 10*3/MM3 (ref 140–450)
PMV BLD AUTO: 10.6 FL (ref 6–12)
POTASSIUM SERPL-SCNC: 4.2 MMOL/L (ref 3.5–5.2)
PROTHROMBIN TIME: 30.3 SECONDS (ref 11.7–14.2)
RBC # BLD AUTO: 4.63 10*6/MM3 (ref 4.14–5.8)
SODIUM SERPL-SCNC: 139 MMOL/L (ref 136–145)
WBC NRBC COR # BLD: 6.43 10*3/MM3 (ref 3.4–10.8)

## 2022-02-22 PROCEDURE — 97110 THERAPEUTIC EXERCISES: CPT

## 2022-02-22 PROCEDURE — 85025 COMPLETE CBC W/AUTO DIFF WBC: CPT | Performed by: NURSE PRACTITIONER

## 2022-02-22 PROCEDURE — 83735 ASSAY OF MAGNESIUM: CPT | Performed by: INTERNAL MEDICINE

## 2022-02-22 PROCEDURE — 80069 RENAL FUNCTION PANEL: CPT | Performed by: INTERNAL MEDICINE

## 2022-02-22 PROCEDURE — 82962 GLUCOSE BLOOD TEST: CPT

## 2022-02-22 PROCEDURE — 99232 SBSQ HOSP IP/OBS MODERATE 35: CPT | Performed by: PHYSICIAN ASSISTANT

## 2022-02-22 PROCEDURE — 63710000001 INSULIN LISPRO (HUMAN) PER 5 UNITS: Performed by: NURSE PRACTITIONER

## 2022-02-22 PROCEDURE — 85610 PROTHROMBIN TIME: CPT | Performed by: NURSE PRACTITIONER

## 2022-02-22 PROCEDURE — 85730 THROMBOPLASTIN TIME PARTIAL: CPT | Performed by: NURSE PRACTITIONER

## 2022-02-22 RX ORDER — FUROSEMIDE 40 MG/1
40 TABLET ORAL DAILY
Status: DISCONTINUED | OUTPATIENT
Start: 2022-02-22 | End: 2022-03-02 | Stop reason: HOSPADM

## 2022-02-22 RX ORDER — WARFARIN SODIUM 2.5 MG/1
2.5 TABLET ORAL
Status: COMPLETED | OUTPATIENT
Start: 2022-02-22 | End: 2022-02-22

## 2022-02-22 RX ORDER — CALCIUM POLYCARBOPHIL 625 MG
1250 TABLET ORAL 2 TIMES DAILY WITH MEALS
Status: DISCONTINUED | OUTPATIENT
Start: 2022-02-22 | End: 2022-03-02 | Stop reason: HOSPADM

## 2022-02-22 RX ADMIN — METOPROLOL SUCCINATE 12.5 MG: 25 TABLET, EXTENDED RELEASE ORAL at 06:22

## 2022-02-22 RX ADMIN — VANCOMYCIN HYDROCHLORIDE 125 MG: 125 CAPSULE ORAL at 18:13

## 2022-02-22 RX ADMIN — INSULIN LISPRO 2 UNITS: 100 INJECTION, SOLUTION INTRAVENOUS; SUBCUTANEOUS at 18:02

## 2022-02-22 RX ADMIN — VANCOMYCIN HYDROCHLORIDE 125 MG: 125 CAPSULE ORAL at 11:01

## 2022-02-22 RX ADMIN — WARFARIN 5 MG: 5 TABLET ORAL at 18:13

## 2022-02-22 RX ADMIN — SODIUM BICARBONATE 1300 MG: 650 TABLET ORAL at 08:25

## 2022-02-22 RX ADMIN — VANCOMYCIN HYDROCHLORIDE 125 MG: 125 CAPSULE ORAL at 06:21

## 2022-02-22 RX ADMIN — DIGOXIN 125 MCG: 125 TABLET ORAL at 06:21

## 2022-02-22 RX ADMIN — Medication 250 MG: at 21:39

## 2022-02-22 RX ADMIN — GUAIFENESIN 600 MG: 600 TABLET, EXTENDED RELEASE ORAL at 06:21

## 2022-02-22 RX ADMIN — FUROSEMIDE 40 MG: 40 TABLET ORAL at 18:13

## 2022-02-22 RX ADMIN — ASPIRIN 81 MG: 81 TABLET, COATED ORAL at 06:22

## 2022-02-22 RX ADMIN — FAMOTIDINE 20 MG: 20 TABLET, FILM COATED ORAL at 18:02

## 2022-02-22 RX ADMIN — SODIUM CHLORIDE, PRESERVATIVE FREE 10 ML: 5 INJECTION INTRAVENOUS at 21:39

## 2022-02-22 RX ADMIN — WARFARIN 2.5 MG: 5 TABLET ORAL at 18:02

## 2022-02-22 RX ADMIN — FAMOTIDINE 20 MG: 20 TABLET, FILM COATED ORAL at 06:21

## 2022-02-22 RX ADMIN — SODIUM CHLORIDE, PRESERVATIVE FREE 10 ML: 5 INJECTION INTRAVENOUS at 08:25

## 2022-02-22 RX ADMIN — CALCIUM POLYCARBOPHIL 1250 MG: 625 TABLET, FILM COATED ORAL at 08:25

## 2022-02-22 RX ADMIN — SODIUM BICARBONATE 1300 MG: 650 TABLET ORAL at 18:03

## 2022-02-22 RX ADMIN — SODIUM BICARBONATE 1300 MG: 650 TABLET ORAL at 21:39

## 2022-02-22 RX ADMIN — VANCOMYCIN HYDROCHLORIDE 125 MG: 125 CAPSULE ORAL at 00:16

## 2022-02-22 RX ADMIN — CALCIUM POLYCARBOPHIL 1250 MG: 625 TABLET, FILM COATED ORAL at 18:02

## 2022-02-22 RX ADMIN — Medication 250 MG: at 08:25

## 2022-02-22 RX ADMIN — ATORVASTATIN CALCIUM 40 MG: 20 TABLET, FILM COATED ORAL at 06:21

## 2022-02-23 LAB
ALBUMIN SERPL-MCNC: 2.2 G/DL (ref 3.5–5.2)
ANION GAP SERPL CALCULATED.3IONS-SCNC: 6 MMOL/L (ref 5–15)
APTT PPP: 54.6 SECONDS (ref 22.7–35.4)
BASOPHILS # BLD AUTO: 0.04 10*3/MM3 (ref 0–0.2)
BASOPHILS NFR BLD AUTO: 0.7 % (ref 0–1.5)
BILIRUB UR QL STRIP: NEGATIVE
BUN SERPL-MCNC: 14 MG/DL (ref 8–23)
BUN/CREAT SERPL: 15.1 (ref 7–25)
CALCIUM SPEC-SCNC: 8.1 MG/DL (ref 8.6–10.5)
CHLORIDE SERPL-SCNC: 106 MMOL/L (ref 98–107)
CLARITY UR: CLEAR
CO2 SERPL-SCNC: 28 MMOL/L (ref 22–29)
COLOR UR: YELLOW
CREAT SERPL-MCNC: 0.93 MG/DL (ref 0.76–1.27)
DEPRECATED RDW RBC AUTO: 45.3 FL (ref 37–54)
EOSINOPHIL # BLD AUTO: 0.19 10*3/MM3 (ref 0–0.4)
EOSINOPHIL NFR BLD AUTO: 3.3 % (ref 0.3–6.2)
ERYTHROCYTE [DISTWIDTH] IN BLOOD BY AUTOMATED COUNT: 15.6 % (ref 12.3–15.4)
GFR SERPL CREATININE-BSD FRML MDRD: 77 ML/MIN/1.73
GLUCOSE BLDC GLUCOMTR-MCNC: 108 MG/DL (ref 70–130)
GLUCOSE BLDC GLUCOMTR-MCNC: 141 MG/DL (ref 70–130)
GLUCOSE BLDC GLUCOMTR-MCNC: 168 MG/DL (ref 70–130)
GLUCOSE BLDC GLUCOMTR-MCNC: 241 MG/DL (ref 70–130)
GLUCOSE SERPL-MCNC: 115 MG/DL (ref 65–99)
GLUCOSE UR STRIP-MCNC: NEGATIVE MG/DL
HCT VFR BLD AUTO: 37.1 % (ref 37.5–51)
HGB BLD-MCNC: 11.8 G/DL (ref 13–17.7)
HGB UR QL STRIP.AUTO: NEGATIVE
IMM GRANULOCYTES # BLD AUTO: 0.03 10*3/MM3 (ref 0–0.05)
IMM GRANULOCYTES NFR BLD AUTO: 0.5 % (ref 0–0.5)
INR PPP: 2.87 (ref 0.9–1.1)
KETONES UR QL STRIP: NEGATIVE
LEUKOCYTE ESTERASE UR QL STRIP.AUTO: NEGATIVE
LYMPHOCYTES # BLD AUTO: 0.87 10*3/MM3 (ref 0.7–3.1)
LYMPHOCYTES NFR BLD AUTO: 15.1 % (ref 19.6–45.3)
MCH RBC QN AUTO: 25.7 PG (ref 26.6–33)
MCHC RBC AUTO-ENTMCNC: 31.8 G/DL (ref 31.5–35.7)
MCV RBC AUTO: 80.8 FL (ref 79–97)
MONOCYTES # BLD AUTO: 0.48 10*3/MM3 (ref 0.1–0.9)
MONOCYTES NFR BLD AUTO: 8.3 % (ref 5–12)
NEUTROPHILS NFR BLD AUTO: 4.17 10*3/MM3 (ref 1.7–7)
NEUTROPHILS NFR BLD AUTO: 72.1 % (ref 42.7–76)
NITRITE UR QL STRIP: NEGATIVE
NRBC BLD AUTO-RTO: 0 /100 WBC (ref 0–0.2)
PH UR STRIP.AUTO: <=5 [PH] (ref 5–8)
PHOSPHATE SERPL-MCNC: 2.8 MG/DL (ref 2.5–4.5)
PLATELET # BLD AUTO: 165 10*3/MM3 (ref 140–450)
PMV BLD AUTO: 10.3 FL (ref 6–12)
POTASSIUM SERPL-SCNC: 3.7 MMOL/L (ref 3.5–5.2)
PROT UR QL STRIP: NEGATIVE
PROTHROMBIN TIME: 30.3 SECONDS (ref 11.7–14.2)
RBC # BLD AUTO: 4.59 10*6/MM3 (ref 4.14–5.8)
SODIUM SERPL-SCNC: 140 MMOL/L (ref 136–145)
SP GR UR STRIP: 1.01 (ref 1–1.03)
UROBILINOGEN UR QL STRIP: NORMAL
WBC NRBC COR # BLD: 5.78 10*3/MM3 (ref 3.4–10.8)

## 2022-02-23 PROCEDURE — 81003 URINALYSIS AUTO W/O SCOPE: CPT | Performed by: INTERNAL MEDICINE

## 2022-02-23 PROCEDURE — 99232 SBSQ HOSP IP/OBS MODERATE 35: CPT | Performed by: PHYSICIAN ASSISTANT

## 2022-02-23 PROCEDURE — 85025 COMPLETE CBC W/AUTO DIFF WBC: CPT | Performed by: NURSE PRACTITIONER

## 2022-02-23 PROCEDURE — 85610 PROTHROMBIN TIME: CPT | Performed by: NURSE PRACTITIONER

## 2022-02-23 PROCEDURE — 63710000001 INSULIN LISPRO (HUMAN) PER 5 UNITS: Performed by: NURSE PRACTITIONER

## 2022-02-23 PROCEDURE — 80069 RENAL FUNCTION PANEL: CPT | Performed by: INTERNAL MEDICINE

## 2022-02-23 PROCEDURE — 92526 ORAL FUNCTION THERAPY: CPT | Performed by: SPEECH-LANGUAGE PATHOLOGIST

## 2022-02-23 PROCEDURE — 85730 THROMBOPLASTIN TIME PARTIAL: CPT | Performed by: NURSE PRACTITIONER

## 2022-02-23 PROCEDURE — 99223 1ST HOSP IP/OBS HIGH 75: CPT | Performed by: STUDENT IN AN ORGANIZED HEALTH CARE EDUCATION/TRAINING PROGRAM

## 2022-02-23 PROCEDURE — 82962 GLUCOSE BLOOD TEST: CPT

## 2022-02-23 RX ORDER — L.ACID,PARA/B.BIFIDUM/S.THERM 8B CELL
1 CAPSULE ORAL DAILY
Status: DISCONTINUED | OUTPATIENT
Start: 2022-02-23 | End: 2022-03-02 | Stop reason: HOSPADM

## 2022-02-23 RX ORDER — VANCOMYCIN HYDROCHLORIDE 125 MG/1
125 CAPSULE ORAL EVERY 12 HOURS
Status: DISCONTINUED | OUTPATIENT
Start: 2022-03-05 | End: 2022-03-02 | Stop reason: HOSPADM

## 2022-02-23 RX ORDER — VANCOMYCIN HYDROCHLORIDE 125 MG/1
125 CAPSULE ORAL EVERY 24 HOURS
Status: DISCONTINUED | OUTPATIENT
Start: 2022-03-12 | End: 2022-03-02 | Stop reason: HOSPADM

## 2022-02-23 RX ORDER — VANCOMYCIN HYDROCHLORIDE 125 MG/1
125 CAPSULE ORAL EVERY 6 HOURS SCHEDULED
Status: DISCONTINUED | OUTPATIENT
Start: 2022-02-23 | End: 2022-03-02 | Stop reason: HOSPADM

## 2022-02-23 RX ORDER — VANCOMYCIN HYDROCHLORIDE 125 MG/1
125 CAPSULE ORAL EVERY OTHER DAY
Status: DISCONTINUED | OUTPATIENT
Start: 2022-03-19 | End: 2022-03-02 | Stop reason: HOSPADM

## 2022-02-23 RX ADMIN — Medication 250 MG: at 08:30

## 2022-02-23 RX ADMIN — VANCOMYCIN HYDROCHLORIDE 125 MG: 125 CAPSULE ORAL at 11:38

## 2022-02-23 RX ADMIN — INSULIN LISPRO 2 UNITS: 100 INJECTION, SOLUTION INTRAVENOUS; SUBCUTANEOUS at 11:38

## 2022-02-23 RX ADMIN — WARFARIN 7.5 MG: 7.5 TABLET ORAL at 17:12

## 2022-02-23 RX ADMIN — SODIUM CHLORIDE, PRESERVATIVE FREE 10 ML: 5 INJECTION INTRAVENOUS at 20:25

## 2022-02-23 RX ADMIN — ASPIRIN 81 MG: 81 TABLET, COATED ORAL at 08:30

## 2022-02-23 RX ADMIN — ATORVASTATIN CALCIUM 40 MG: 20 TABLET, FILM COATED ORAL at 08:30

## 2022-02-23 RX ADMIN — SODIUM CHLORIDE, PRESERVATIVE FREE 10 ML: 5 INJECTION INTRAVENOUS at 08:30

## 2022-02-23 RX ADMIN — DIGOXIN 125 MCG: 125 TABLET ORAL at 10:04

## 2022-02-23 RX ADMIN — FERROUS SULFATE TAB 325 MG (65 MG ELEMENTAL FE) 325 MG: 325 (65 FE) TAB at 10:04

## 2022-02-23 RX ADMIN — VANCOMYCIN HYDROCHLORIDE 125 MG: 125 CAPSULE ORAL at 00:53

## 2022-02-23 RX ADMIN — VANCOMYCIN HYDROCHLORIDE 125 MG: 125 CAPSULE ORAL at 23:55

## 2022-02-23 RX ADMIN — CALCIUM POLYCARBOPHIL 1250 MG: 625 TABLET, FILM COATED ORAL at 08:30

## 2022-02-23 RX ADMIN — VANCOMYCIN HYDROCHLORIDE 125 MG: 125 CAPSULE ORAL at 17:12

## 2022-02-23 RX ADMIN — SODIUM BICARBONATE 1300 MG: 650 TABLET ORAL at 08:30

## 2022-02-23 RX ADMIN — METOPROLOL SUCCINATE 12.5 MG: 25 TABLET, EXTENDED RELEASE ORAL at 10:04

## 2022-02-23 RX ADMIN — Medication 250 MG: at 20:25

## 2022-02-23 RX ADMIN — VANCOMYCIN HYDROCHLORIDE 125 MG: 125 CAPSULE ORAL at 06:34

## 2022-02-23 RX ADMIN — FUROSEMIDE 40 MG: 40 TABLET ORAL at 08:30

## 2022-02-23 RX ADMIN — Medication 1 CAPSULE: at 16:06

## 2022-02-23 RX ADMIN — CALCIUM POLYCARBOPHIL 1250 MG: 625 TABLET, FILM COATED ORAL at 17:12

## 2022-02-23 RX ADMIN — FAMOTIDINE 20 MG: 20 TABLET, FILM COATED ORAL at 17:12

## 2022-02-23 RX ADMIN — SODIUM BICARBONATE 1300 MG: 650 TABLET ORAL at 20:25

## 2022-02-23 RX ADMIN — SODIUM BICARBONATE 1300 MG: 650 TABLET ORAL at 16:06

## 2022-02-23 RX ADMIN — FAMOTIDINE 20 MG: 20 TABLET, FILM COATED ORAL at 06:34

## 2022-02-24 LAB
ALBUMIN SERPL-MCNC: 2.2 G/DL (ref 3.5–5.2)
ANION GAP SERPL CALCULATED.3IONS-SCNC: 8.6 MMOL/L (ref 5–15)
APTT PPP: 59 SECONDS (ref 22.7–35.4)
BASOPHILS # BLD AUTO: 0.04 10*3/MM3 (ref 0–0.2)
BASOPHILS NFR BLD AUTO: 0.7 % (ref 0–1.5)
BUN SERPL-MCNC: 17 MG/DL (ref 8–23)
BUN/CREAT SERPL: 18.5 (ref 7–25)
CALCIUM SPEC-SCNC: 8.2 MG/DL (ref 8.6–10.5)
CHLORIDE SERPL-SCNC: 105 MMOL/L (ref 98–107)
CO2 SERPL-SCNC: 26.4 MMOL/L (ref 22–29)
CREAT SERPL-MCNC: 0.92 MG/DL (ref 0.76–1.27)
DEPRECATED RDW RBC AUTO: 43.8 FL (ref 37–54)
EOSINOPHIL # BLD AUTO: 0.19 10*3/MM3 (ref 0–0.4)
EOSINOPHIL NFR BLD AUTO: 3.1 % (ref 0.3–6.2)
ERYTHROCYTE [DISTWIDTH] IN BLOOD BY AUTOMATED COUNT: 15.7 % (ref 12.3–15.4)
GFR SERPL CREATININE-BSD FRML MDRD: 78 ML/MIN/1.73
GLUCOSE BLDC GLUCOMTR-MCNC: 142 MG/DL (ref 70–130)
GLUCOSE BLDC GLUCOMTR-MCNC: 152 MG/DL (ref 70–130)
GLUCOSE BLDC GLUCOMTR-MCNC: 192 MG/DL (ref 70–130)
GLUCOSE BLDC GLUCOMTR-MCNC: 213 MG/DL (ref 70–130)
GLUCOSE SERPL-MCNC: 125 MG/DL (ref 65–99)
HCT VFR BLD AUTO: 36 % (ref 37.5–51)
HGB BLD-MCNC: 11.9 G/DL (ref 13–17.7)
IMM GRANULOCYTES # BLD AUTO: 0.02 10*3/MM3 (ref 0–0.05)
IMM GRANULOCYTES NFR BLD AUTO: 0.3 % (ref 0–0.5)
INR PPP: 3.37 (ref 0.9–1.1)
LYMPHOCYTES # BLD AUTO: 0.85 10*3/MM3 (ref 0.7–3.1)
LYMPHOCYTES NFR BLD AUTO: 13.8 % (ref 19.6–45.3)
MCH RBC QN AUTO: 25.9 PG (ref 26.6–33)
MCHC RBC AUTO-ENTMCNC: 33.1 G/DL (ref 31.5–35.7)
MCV RBC AUTO: 78.3 FL (ref 79–97)
MONOCYTES # BLD AUTO: 0.46 10*3/MM3 (ref 0.1–0.9)
MONOCYTES NFR BLD AUTO: 7.5 % (ref 5–12)
NEUTROPHILS NFR BLD AUTO: 4.59 10*3/MM3 (ref 1.7–7)
NEUTROPHILS NFR BLD AUTO: 74.6 % (ref 42.7–76)
NRBC BLD AUTO-RTO: 0 /100 WBC (ref 0–0.2)
PHOSPHATE SERPL-MCNC: 2.7 MG/DL (ref 2.5–4.5)
PLATELET # BLD AUTO: 161 10*3/MM3 (ref 140–450)
PMV BLD AUTO: 10.9 FL (ref 6–12)
POTASSIUM SERPL-SCNC: 3.8 MMOL/L (ref 3.5–5.2)
PROTHROMBIN TIME: 34.4 SECONDS (ref 11.7–14.2)
RBC # BLD AUTO: 4.6 10*6/MM3 (ref 4.14–5.8)
SODIUM SERPL-SCNC: 140 MMOL/L (ref 136–145)
WBC NRBC COR # BLD: 6.15 10*3/MM3 (ref 3.4–10.8)

## 2022-02-24 PROCEDURE — 99232 SBSQ HOSP IP/OBS MODERATE 35: CPT | Performed by: PHYSICIAN ASSISTANT

## 2022-02-24 PROCEDURE — 63710000001 INSULIN LISPRO (HUMAN) PER 5 UNITS: Performed by: NURSE PRACTITIONER

## 2022-02-24 PROCEDURE — 97110 THERAPEUTIC EXERCISES: CPT

## 2022-02-24 PROCEDURE — 82962 GLUCOSE BLOOD TEST: CPT

## 2022-02-24 PROCEDURE — 85610 PROTHROMBIN TIME: CPT | Performed by: NURSE PRACTITIONER

## 2022-02-24 PROCEDURE — 80069 RENAL FUNCTION PANEL: CPT | Performed by: INTERNAL MEDICINE

## 2022-02-24 PROCEDURE — 85730 THROMBOPLASTIN TIME PARTIAL: CPT | Performed by: NURSE PRACTITIONER

## 2022-02-24 PROCEDURE — 85025 COMPLETE CBC W/AUTO DIFF WBC: CPT | Performed by: NURSE PRACTITIONER

## 2022-02-24 PROCEDURE — 87324 CLOSTRIDIUM AG IA: CPT | Performed by: STUDENT IN AN ORGANIZED HEALTH CARE EDUCATION/TRAINING PROGRAM

## 2022-02-24 RX ADMIN — Medication 250 MG: at 21:02

## 2022-02-24 RX ADMIN — FUROSEMIDE 40 MG: 40 TABLET ORAL at 09:11

## 2022-02-24 RX ADMIN — METOPROLOL SUCCINATE 12.5 MG: 25 TABLET, EXTENDED RELEASE ORAL at 09:11

## 2022-02-24 RX ADMIN — INSULIN LISPRO 2 UNITS: 100 INJECTION, SOLUTION INTRAVENOUS; SUBCUTANEOUS at 11:35

## 2022-02-24 RX ADMIN — SODIUM BICARBONATE 1300 MG: 650 TABLET ORAL at 09:12

## 2022-02-24 RX ADMIN — Medication 250 MG: at 09:11

## 2022-02-24 RX ADMIN — FAMOTIDINE 20 MG: 20 TABLET, FILM COATED ORAL at 17:04

## 2022-02-24 RX ADMIN — VANCOMYCIN HYDROCHLORIDE 125 MG: 125 CAPSULE ORAL at 05:13

## 2022-02-24 RX ADMIN — Medication 1 CAPSULE: at 09:11

## 2022-02-24 RX ADMIN — VANCOMYCIN HYDROCHLORIDE 125 MG: 125 CAPSULE ORAL at 11:35

## 2022-02-24 RX ADMIN — INSULIN LISPRO 3 UNITS: 100 INJECTION, SOLUTION INTRAVENOUS; SUBCUTANEOUS at 17:04

## 2022-02-24 RX ADMIN — SODIUM CHLORIDE, PRESERVATIVE FREE 10 ML: 5 INJECTION INTRAVENOUS at 09:12

## 2022-02-24 RX ADMIN — FAMOTIDINE 20 MG: 20 TABLET, FILM COATED ORAL at 05:12

## 2022-02-24 RX ADMIN — SODIUM BICARBONATE 1300 MG: 650 TABLET ORAL at 17:04

## 2022-02-24 RX ADMIN — WARFARIN 5 MG: 5 TABLET ORAL at 17:04

## 2022-02-24 RX ADMIN — CALCIUM POLYCARBOPHIL 1250 MG: 625 TABLET, FILM COATED ORAL at 17:04

## 2022-02-24 RX ADMIN — SODIUM BICARBONATE 1300 MG: 650 TABLET ORAL at 21:02

## 2022-02-24 RX ADMIN — ASPIRIN 81 MG: 81 TABLET, COATED ORAL at 09:11

## 2022-02-24 RX ADMIN — VANCOMYCIN HYDROCHLORIDE 125 MG: 125 CAPSULE ORAL at 17:08

## 2022-02-24 RX ADMIN — CALCIUM POLYCARBOPHIL 1250 MG: 625 TABLET, FILM COATED ORAL at 09:11

## 2022-02-24 RX ADMIN — ATORVASTATIN CALCIUM 40 MG: 20 TABLET, FILM COATED ORAL at 09:11

## 2022-02-24 RX ADMIN — SODIUM CHLORIDE, PRESERVATIVE FREE 10 ML: 5 INJECTION INTRAVENOUS at 21:02

## 2022-02-24 RX ADMIN — DIGOXIN 125 MCG: 125 TABLET ORAL at 09:11

## 2022-02-25 LAB
ALBUMIN SERPL-MCNC: 2.3 G/DL (ref 3.5–5.2)
ANION GAP SERPL CALCULATED.3IONS-SCNC: 6.1 MMOL/L (ref 5–15)
APTT PPP: 56.1 SECONDS (ref 22.7–35.4)
BASOPHILS # BLD AUTO: 0.04 10*3/MM3 (ref 0–0.2)
BASOPHILS NFR BLD AUTO: 0.7 % (ref 0–1.5)
BUN SERPL-MCNC: 19 MG/DL (ref 8–23)
BUN/CREAT SERPL: 15.1 (ref 7–25)
C DIFF TOX A+B STL QL IA: NEGATIVE
CALCIUM SPEC-SCNC: 8.5 MG/DL (ref 8.6–10.5)
CHLORIDE SERPL-SCNC: 103 MMOL/L (ref 98–107)
CO2 SERPL-SCNC: 30.9 MMOL/L (ref 22–29)
CREAT SERPL-MCNC: 1.26 MG/DL (ref 0.76–1.27)
DEPRECATED RDW RBC AUTO: 46.3 FL (ref 37–54)
EOSINOPHIL # BLD AUTO: 0.18 10*3/MM3 (ref 0–0.4)
EOSINOPHIL NFR BLD AUTO: 3 % (ref 0.3–6.2)
ERYTHROCYTE [DISTWIDTH] IN BLOOD BY AUTOMATED COUNT: 15.6 % (ref 12.3–15.4)
GFR SERPL CREATININE-BSD FRML MDRD: 55 ML/MIN/1.73
GLUCOSE BLDC GLUCOMTR-MCNC: 115 MG/DL (ref 70–130)
GLUCOSE BLDC GLUCOMTR-MCNC: 134 MG/DL (ref 70–130)
GLUCOSE BLDC GLUCOMTR-MCNC: 210 MG/DL (ref 70–130)
GLUCOSE BLDC GLUCOMTR-MCNC: 98 MG/DL (ref 70–130)
GLUCOSE SERPL-MCNC: 129 MG/DL (ref 65–99)
HCT VFR BLD AUTO: 37.7 % (ref 37.5–51)
HGB BLD-MCNC: 12 G/DL (ref 13–17.7)
IMM GRANULOCYTES # BLD AUTO: 0.03 10*3/MM3 (ref 0–0.05)
IMM GRANULOCYTES NFR BLD AUTO: 0.5 % (ref 0–0.5)
INR PPP: 3.37 (ref 0.9–1.1)
LYMPHOCYTES # BLD AUTO: 0.86 10*3/MM3 (ref 0.7–3.1)
LYMPHOCYTES NFR BLD AUTO: 14.3 % (ref 19.6–45.3)
MCH RBC QN AUTO: 26 PG (ref 26.6–33)
MCHC RBC AUTO-ENTMCNC: 31.8 G/DL (ref 31.5–35.7)
MCV RBC AUTO: 81.6 FL (ref 79–97)
MONOCYTES # BLD AUTO: 0.41 10*3/MM3 (ref 0.1–0.9)
MONOCYTES NFR BLD AUTO: 6.8 % (ref 5–12)
NEUTROPHILS NFR BLD AUTO: 4.5 10*3/MM3 (ref 1.7–7)
NEUTROPHILS NFR BLD AUTO: 74.7 % (ref 42.7–76)
NRBC BLD AUTO-RTO: 0 /100 WBC (ref 0–0.2)
PHOSPHATE SERPL-MCNC: 2.7 MG/DL (ref 2.5–4.5)
PLATELET # BLD AUTO: 175 10*3/MM3 (ref 140–450)
PMV BLD AUTO: 10.9 FL (ref 6–12)
POTASSIUM SERPL-SCNC: 3.8 MMOL/L (ref 3.5–5.2)
PROTHROMBIN TIME: 34.4 SECONDS (ref 11.7–14.2)
RBC # BLD AUTO: 4.62 10*6/MM3 (ref 4.14–5.8)
SODIUM SERPL-SCNC: 140 MMOL/L (ref 136–145)
WBC NRBC COR # BLD: 6.02 10*3/MM3 (ref 3.4–10.8)

## 2022-02-25 PROCEDURE — 82962 GLUCOSE BLOOD TEST: CPT

## 2022-02-25 PROCEDURE — 85610 PROTHROMBIN TIME: CPT | Performed by: NURSE PRACTITIONER

## 2022-02-25 PROCEDURE — 85025 COMPLETE CBC W/AUTO DIFF WBC: CPT | Performed by: NURSE PRACTITIONER

## 2022-02-25 PROCEDURE — 85730 THROMBOPLASTIN TIME PARTIAL: CPT | Performed by: NURSE PRACTITIONER

## 2022-02-25 PROCEDURE — 97530 THERAPEUTIC ACTIVITIES: CPT

## 2022-02-25 PROCEDURE — 80069 RENAL FUNCTION PANEL: CPT | Performed by: INTERNAL MEDICINE

## 2022-02-25 PROCEDURE — 99232 SBSQ HOSP IP/OBS MODERATE 35: CPT | Performed by: PHYSICIAN ASSISTANT

## 2022-02-25 PROCEDURE — 63710000001 INSULIN LISPRO (HUMAN) PER 5 UNITS: Performed by: NURSE PRACTITIONER

## 2022-02-25 PROCEDURE — 97110 THERAPEUTIC EXERCISES: CPT

## 2022-02-25 RX ORDER — MONTELUKAST SODIUM 4 MG/1
1 TABLET, CHEWABLE ORAL
Status: DISCONTINUED | OUTPATIENT
Start: 2022-02-25 | End: 2022-02-26

## 2022-02-25 RX ORDER — MONTELUKAST SODIUM 4 MG/1
1 TABLET, CHEWABLE ORAL 2 TIMES DAILY
Qty: 60 TABLET | Refills: 1 | Status: SHIPPED | OUTPATIENT
Start: 2022-02-25 | End: 2022-04-04 | Stop reason: HOSPADM

## 2022-02-25 RX ADMIN — SODIUM CHLORIDE, PRESERVATIVE FREE 10 ML: 5 INJECTION INTRAVENOUS at 20:06

## 2022-02-25 RX ADMIN — FAMOTIDINE 20 MG: 20 TABLET, FILM COATED ORAL at 06:03

## 2022-02-25 RX ADMIN — Medication 250 MG: at 20:04

## 2022-02-25 RX ADMIN — VANCOMYCIN HYDROCHLORIDE 125 MG: 125 CAPSULE ORAL at 17:21

## 2022-02-25 RX ADMIN — ATORVASTATIN CALCIUM 40 MG: 20 TABLET, FILM COATED ORAL at 09:20

## 2022-02-25 RX ADMIN — FUROSEMIDE 40 MG: 40 TABLET ORAL at 09:20

## 2022-02-25 RX ADMIN — VANCOMYCIN HYDROCHLORIDE 125 MG: 125 CAPSULE ORAL at 06:03

## 2022-02-25 RX ADMIN — WARFARIN 7.5 MG: 7.5 TABLET ORAL at 17:20

## 2022-02-25 RX ADMIN — SODIUM CHLORIDE, PRESERVATIVE FREE 10 ML: 5 INJECTION INTRAVENOUS at 09:21

## 2022-02-25 RX ADMIN — METOPROLOL SUCCINATE 12.5 MG: 25 TABLET, EXTENDED RELEASE ORAL at 09:20

## 2022-02-25 RX ADMIN — VANCOMYCIN HYDROCHLORIDE 125 MG: 125 CAPSULE ORAL at 11:52

## 2022-02-25 RX ADMIN — Medication 250 MG: at 09:20

## 2022-02-25 RX ADMIN — FAMOTIDINE 20 MG: 20 TABLET, FILM COATED ORAL at 17:20

## 2022-02-25 RX ADMIN — ASPIRIN 81 MG: 81 TABLET, COATED ORAL at 09:20

## 2022-02-25 RX ADMIN — SODIUM BICARBONATE 1300 MG: 650 TABLET ORAL at 20:04

## 2022-02-25 RX ADMIN — SODIUM BICARBONATE 1300 MG: 650 TABLET ORAL at 09:20

## 2022-02-25 RX ADMIN — CALCIUM POLYCARBOPHIL 1250 MG: 625 TABLET, FILM COATED ORAL at 09:20

## 2022-02-25 RX ADMIN — INSULIN LISPRO 3 UNITS: 100 INJECTION, SOLUTION INTRAVENOUS; SUBCUTANEOUS at 11:52

## 2022-02-25 RX ADMIN — Medication 1 CAPSULE: at 09:20

## 2022-02-25 RX ADMIN — SODIUM BICARBONATE 1300 MG: 650 TABLET ORAL at 17:20

## 2022-02-25 RX ADMIN — VANCOMYCIN HYDROCHLORIDE 125 MG: 125 CAPSULE ORAL at 00:38

## 2022-02-25 RX ADMIN — FERROUS SULFATE TAB 325 MG (65 MG ELEMENTAL FE) 325 MG: 325 (65 FE) TAB at 09:20

## 2022-02-25 RX ADMIN — CALCIUM POLYCARBOPHIL 1250 MG: 625 TABLET, FILM COATED ORAL at 17:20

## 2022-02-25 RX ADMIN — DIGOXIN 125 MCG: 125 TABLET ORAL at 09:20

## 2022-02-26 LAB
ALBUMIN SERPL-MCNC: 2.6 G/DL (ref 3.5–5.2)
ANION GAP SERPL CALCULATED.3IONS-SCNC: 7 MMOL/L (ref 5–15)
APTT PPP: 53.6 SECONDS (ref 22.7–35.4)
BASOPHILS # BLD AUTO: 0.06 10*3/MM3 (ref 0–0.2)
BASOPHILS NFR BLD AUTO: 1 % (ref 0–1.5)
BUN SERPL-MCNC: 17 MG/DL (ref 8–23)
BUN/CREAT SERPL: 14.7 (ref 7–25)
CALCIUM SPEC-SCNC: 8.2 MG/DL (ref 8.6–10.5)
CHLORIDE SERPL-SCNC: 100 MMOL/L (ref 98–107)
CO2 SERPL-SCNC: 31 MMOL/L (ref 22–29)
CREAT SERPL-MCNC: 1.16 MG/DL (ref 0.76–1.27)
DEPRECATED RDW RBC AUTO: 44.3 FL (ref 37–54)
EOSINOPHIL # BLD AUTO: 0.18 10*3/MM3 (ref 0–0.4)
EOSINOPHIL NFR BLD AUTO: 2.9 % (ref 0.3–6.2)
ERYTHROCYTE [DISTWIDTH] IN BLOOD BY AUTOMATED COUNT: 15.2 % (ref 12.3–15.4)
GFR SERPL CREATININE-BSD FRML MDRD: 60 ML/MIN/1.73
GLUCOSE BLDC GLUCOMTR-MCNC: 109 MG/DL (ref 70–130)
GLUCOSE BLDC GLUCOMTR-MCNC: 141 MG/DL (ref 70–130)
GLUCOSE BLDC GLUCOMTR-MCNC: 181 MG/DL (ref 70–130)
GLUCOSE BLDC GLUCOMTR-MCNC: 200 MG/DL (ref 70–130)
GLUCOSE SERPL-MCNC: 118 MG/DL (ref 65–99)
HCT VFR BLD AUTO: 38.4 % (ref 37.5–51)
HGB BLD-MCNC: 12.2 G/DL (ref 13–17.7)
IMM GRANULOCYTES # BLD AUTO: 0.01 10*3/MM3 (ref 0–0.05)
IMM GRANULOCYTES NFR BLD AUTO: 0.2 % (ref 0–0.5)
INR PPP: 3.3 (ref 0.9–1.1)
LYMPHOCYTES # BLD AUTO: 0.87 10*3/MM3 (ref 0.7–3.1)
LYMPHOCYTES NFR BLD AUTO: 14.2 % (ref 19.6–45.3)
MCH RBC QN AUTO: 26 PG (ref 26.6–33)
MCHC RBC AUTO-ENTMCNC: 31.8 G/DL (ref 31.5–35.7)
MCV RBC AUTO: 81.9 FL (ref 79–97)
MONOCYTES # BLD AUTO: 0.5 10*3/MM3 (ref 0.1–0.9)
MONOCYTES NFR BLD AUTO: 8.2 % (ref 5–12)
NEUTROPHILS NFR BLD AUTO: 4.51 10*3/MM3 (ref 1.7–7)
NEUTROPHILS NFR BLD AUTO: 73.5 % (ref 42.7–76)
NRBC BLD AUTO-RTO: 0 /100 WBC (ref 0–0.2)
PHOSPHATE SERPL-MCNC: 3.1 MG/DL (ref 2.5–4.5)
PLATELET # BLD AUTO: 177 10*3/MM3 (ref 140–450)
PMV BLD AUTO: 11.8 FL (ref 6–12)
POTASSIUM SERPL-SCNC: 3.7 MMOL/L (ref 3.5–5.2)
PROTHROMBIN TIME: 33.8 SECONDS (ref 11.7–14.2)
RBC # BLD AUTO: 4.69 10*6/MM3 (ref 4.14–5.8)
SODIUM SERPL-SCNC: 138 MMOL/L (ref 136–145)
WBC NRBC COR # BLD: 6.13 10*3/MM3 (ref 3.4–10.8)

## 2022-02-26 PROCEDURE — 63710000001 INSULIN LISPRO (HUMAN) PER 5 UNITS: Performed by: NURSE PRACTITIONER

## 2022-02-26 PROCEDURE — 82962 GLUCOSE BLOOD TEST: CPT

## 2022-02-26 PROCEDURE — 99232 SBSQ HOSP IP/OBS MODERATE 35: CPT | Performed by: INTERNAL MEDICINE

## 2022-02-26 PROCEDURE — 85610 PROTHROMBIN TIME: CPT | Performed by: NURSE PRACTITIONER

## 2022-02-26 PROCEDURE — 85730 THROMBOPLASTIN TIME PARTIAL: CPT | Performed by: NURSE PRACTITIONER

## 2022-02-26 PROCEDURE — 85025 COMPLETE CBC W/AUTO DIFF WBC: CPT | Performed by: NURSE PRACTITIONER

## 2022-02-26 PROCEDURE — 80069 RENAL FUNCTION PANEL: CPT | Performed by: INTERNAL MEDICINE

## 2022-02-26 RX ORDER — MONTELUKAST SODIUM 4 MG/1
1 TABLET, CHEWABLE ORAL DAILY
Status: DISCONTINUED | OUTPATIENT
Start: 2022-02-27 | End: 2022-02-28

## 2022-02-26 RX ADMIN — INSULIN LISPRO 2 UNITS: 100 INJECTION, SOLUTION INTRAVENOUS; SUBCUTANEOUS at 11:32

## 2022-02-26 RX ADMIN — SODIUM BICARBONATE 1300 MG: 650 TABLET ORAL at 20:43

## 2022-02-26 RX ADMIN — INSULIN LISPRO 3 UNITS: 100 INJECTION, SOLUTION INTRAVENOUS; SUBCUTANEOUS at 16:14

## 2022-02-26 RX ADMIN — VANCOMYCIN HYDROCHLORIDE 125 MG: 125 CAPSULE ORAL at 17:46

## 2022-02-26 RX ADMIN — VANCOMYCIN HYDROCHLORIDE 125 MG: 125 CAPSULE ORAL at 06:31

## 2022-02-26 RX ADMIN — SODIUM BICARBONATE 1300 MG: 650 TABLET ORAL at 16:14

## 2022-02-26 RX ADMIN — CALCIUM POLYCARBOPHIL 1250 MG: 625 TABLET, FILM COATED ORAL at 16:18

## 2022-02-26 RX ADMIN — VANCOMYCIN HYDROCHLORIDE 125 MG: 125 CAPSULE ORAL at 23:52

## 2022-02-26 RX ADMIN — MONTELUKAST SODIUM 1 G: 4 TABLET, CHEWABLE ORAL at 15:11

## 2022-02-26 RX ADMIN — DIGOXIN 125 MCG: 125 TABLET ORAL at 08:40

## 2022-02-26 RX ADMIN — Medication 250 MG: at 20:43

## 2022-02-26 RX ADMIN — VANCOMYCIN HYDROCHLORIDE 125 MG: 125 CAPSULE ORAL at 00:16

## 2022-02-26 RX ADMIN — Medication 1 CAPSULE: at 08:40

## 2022-02-26 RX ADMIN — FAMOTIDINE 20 MG: 20 TABLET, FILM COATED ORAL at 06:31

## 2022-02-26 RX ADMIN — SODIUM BICARBONATE 1300 MG: 650 TABLET ORAL at 08:40

## 2022-02-26 RX ADMIN — Medication 250 MG: at 08:41

## 2022-02-26 RX ADMIN — WARFARIN 5 MG: 5 TABLET ORAL at 17:46

## 2022-02-26 RX ADMIN — SODIUM CHLORIDE, PRESERVATIVE FREE 10 ML: 5 INJECTION INTRAVENOUS at 08:41

## 2022-02-26 RX ADMIN — FUROSEMIDE 40 MG: 40 TABLET ORAL at 08:40

## 2022-02-26 RX ADMIN — SODIUM CHLORIDE, PRESERVATIVE FREE 10 ML: 5 INJECTION INTRAVENOUS at 20:43

## 2022-02-26 RX ADMIN — ASPIRIN 81 MG: 81 TABLET, COATED ORAL at 08:40

## 2022-02-26 RX ADMIN — FAMOTIDINE 20 MG: 20 TABLET, FILM COATED ORAL at 16:15

## 2022-02-26 RX ADMIN — ATORVASTATIN CALCIUM 40 MG: 20 TABLET, FILM COATED ORAL at 08:40

## 2022-02-26 RX ADMIN — METOPROLOL SUCCINATE 12.5 MG: 25 TABLET, EXTENDED RELEASE ORAL at 08:40

## 2022-02-26 RX ADMIN — CALCIUM POLYCARBOPHIL 1250 MG: 625 TABLET, FILM COATED ORAL at 08:40

## 2022-02-26 RX ADMIN — VANCOMYCIN HYDROCHLORIDE 125 MG: 125 CAPSULE ORAL at 11:28

## 2022-02-27 LAB
ALBUMIN SERPL-MCNC: 2.6 G/DL (ref 3.5–5.2)
ANION GAP SERPL CALCULATED.3IONS-SCNC: 6 MMOL/L (ref 5–15)
APTT PPP: 54.9 SECONDS (ref 22.7–35.4)
BASOPHILS # BLD AUTO: 0.05 10*3/MM3 (ref 0–0.2)
BASOPHILS NFR BLD AUTO: 0.8 % (ref 0–1.5)
BUN SERPL-MCNC: 19 MG/DL (ref 8–23)
BUN/CREAT SERPL: 16.2 (ref 7–25)
CALCIUM SPEC-SCNC: 8.2 MG/DL (ref 8.6–10.5)
CHLORIDE SERPL-SCNC: 99 MMOL/L (ref 98–107)
CO2 SERPL-SCNC: 31 MMOL/L (ref 22–29)
CREAT SERPL-MCNC: 1.17 MG/DL (ref 0.76–1.27)
DEPRECATED RDW RBC AUTO: 44.3 FL (ref 37–54)
EOSINOPHIL # BLD AUTO: 0.16 10*3/MM3 (ref 0–0.4)
EOSINOPHIL NFR BLD AUTO: 2.7 % (ref 0.3–6.2)
ERYTHROCYTE [DISTWIDTH] IN BLOOD BY AUTOMATED COUNT: 15.2 % (ref 12.3–15.4)
GFR SERPL CREATININE-BSD FRML MDRD: 59 ML/MIN/1.73
GLUCOSE BLDC GLUCOMTR-MCNC: 127 MG/DL (ref 70–130)
GLUCOSE BLDC GLUCOMTR-MCNC: 194 MG/DL (ref 70–130)
GLUCOSE BLDC GLUCOMTR-MCNC: 210 MG/DL (ref 70–130)
GLUCOSE BLDC GLUCOMTR-MCNC: 269 MG/DL (ref 70–130)
GLUCOSE SERPL-MCNC: 129 MG/DL (ref 65–99)
HCT VFR BLD AUTO: 38.8 % (ref 37.5–51)
HGB BLD-MCNC: 12.2 G/DL (ref 13–17.7)
IMM GRANULOCYTES # BLD AUTO: 0.02 10*3/MM3 (ref 0–0.05)
IMM GRANULOCYTES NFR BLD AUTO: 0.3 % (ref 0–0.5)
INR PPP: 3.48 (ref 0.9–1.1)
LYMPHOCYTES # BLD AUTO: 0.9 10*3/MM3 (ref 0.7–3.1)
LYMPHOCYTES NFR BLD AUTO: 15.3 % (ref 19.6–45.3)
MCH RBC QN AUTO: 25.4 PG (ref 26.6–33)
MCHC RBC AUTO-ENTMCNC: 31.4 G/DL (ref 31.5–35.7)
MCV RBC AUTO: 80.8 FL (ref 79–97)
MONOCYTES # BLD AUTO: 0.43 10*3/MM3 (ref 0.1–0.9)
MONOCYTES NFR BLD AUTO: 7.3 % (ref 5–12)
NEUTROPHILS NFR BLD AUTO: 4.33 10*3/MM3 (ref 1.7–7)
NEUTROPHILS NFR BLD AUTO: 73.6 % (ref 42.7–76)
NRBC BLD AUTO-RTO: 0 /100 WBC (ref 0–0.2)
PHOSPHATE SERPL-MCNC: 2.7 MG/DL (ref 2.5–4.5)
PLATELET # BLD AUTO: 178 10*3/MM3 (ref 140–450)
PMV BLD AUTO: 11.2 FL (ref 6–12)
POTASSIUM SERPL-SCNC: 3.8 MMOL/L (ref 3.5–5.2)
PROTHROMBIN TIME: 35.2 SECONDS (ref 11.7–14.2)
RBC # BLD AUTO: 4.8 10*6/MM3 (ref 4.14–5.8)
SODIUM SERPL-SCNC: 136 MMOL/L (ref 136–145)
WBC NRBC COR # BLD: 5.89 10*3/MM3 (ref 3.4–10.8)

## 2022-02-27 PROCEDURE — 85025 COMPLETE CBC W/AUTO DIFF WBC: CPT | Performed by: NURSE PRACTITIONER

## 2022-02-27 PROCEDURE — 85730 THROMBOPLASTIN TIME PARTIAL: CPT | Performed by: NURSE PRACTITIONER

## 2022-02-27 PROCEDURE — 82962 GLUCOSE BLOOD TEST: CPT

## 2022-02-27 PROCEDURE — 80069 RENAL FUNCTION PANEL: CPT | Performed by: INTERNAL MEDICINE

## 2022-02-27 PROCEDURE — 99231 SBSQ HOSP IP/OBS SF/LOW 25: CPT | Performed by: INTERNAL MEDICINE

## 2022-02-27 PROCEDURE — 85610 PROTHROMBIN TIME: CPT | Performed by: NURSE PRACTITIONER

## 2022-02-27 PROCEDURE — 63710000001 INSULIN LISPRO (HUMAN) PER 5 UNITS: Performed by: NURSE PRACTITIONER

## 2022-02-27 RX ADMIN — FUROSEMIDE 40 MG: 40 TABLET ORAL at 08:38

## 2022-02-27 RX ADMIN — VANCOMYCIN HYDROCHLORIDE 125 MG: 125 CAPSULE ORAL at 23:38

## 2022-02-27 RX ADMIN — SODIUM BICARBONATE 1300 MG: 650 TABLET ORAL at 16:24

## 2022-02-27 RX ADMIN — FAMOTIDINE 20 MG: 20 TABLET, FILM COATED ORAL at 16:25

## 2022-02-27 RX ADMIN — DIGOXIN 125 MCG: 125 TABLET ORAL at 08:38

## 2022-02-27 RX ADMIN — FERROUS SULFATE TAB 325 MG (65 MG ELEMENTAL FE) 325 MG: 325 (65 FE) TAB at 08:38

## 2022-02-27 RX ADMIN — SODIUM BICARBONATE 1300 MG: 650 TABLET ORAL at 08:38

## 2022-02-27 RX ADMIN — SODIUM CHLORIDE, PRESERVATIVE FREE 10 ML: 5 INJECTION INTRAVENOUS at 20:39

## 2022-02-27 RX ADMIN — WARFARIN 5 MG: 5 TABLET ORAL at 17:38

## 2022-02-27 RX ADMIN — ASPIRIN 81 MG: 81 TABLET, COATED ORAL at 08:37

## 2022-02-27 RX ADMIN — VANCOMYCIN HYDROCHLORIDE 125 MG: 125 CAPSULE ORAL at 06:20

## 2022-02-27 RX ADMIN — Medication 250 MG: at 20:38

## 2022-02-27 RX ADMIN — MONTELUKAST SODIUM 1 G: 4 TABLET, CHEWABLE ORAL at 10:11

## 2022-02-27 RX ADMIN — ATORVASTATIN CALCIUM 40 MG: 20 TABLET, FILM COATED ORAL at 08:37

## 2022-02-27 RX ADMIN — SODIUM BICARBONATE 1300 MG: 650 TABLET ORAL at 20:38

## 2022-02-27 RX ADMIN — VANCOMYCIN HYDROCHLORIDE 125 MG: 125 CAPSULE ORAL at 17:38

## 2022-02-27 RX ADMIN — Medication 250 MG: at 08:38

## 2022-02-27 RX ADMIN — INSULIN LISPRO 4 UNITS: 100 INJECTION, SOLUTION INTRAVENOUS; SUBCUTANEOUS at 11:14

## 2022-02-27 RX ADMIN — SODIUM CHLORIDE, PRESERVATIVE FREE 10 ML: 5 INJECTION INTRAVENOUS at 10:12

## 2022-02-27 RX ADMIN — METOPROLOL SUCCINATE 12.5 MG: 25 TABLET, EXTENDED RELEASE ORAL at 08:37

## 2022-02-27 RX ADMIN — CALCIUM POLYCARBOPHIL 1250 MG: 625 TABLET, FILM COATED ORAL at 16:25

## 2022-02-27 RX ADMIN — VANCOMYCIN HYDROCHLORIDE 125 MG: 125 CAPSULE ORAL at 11:14

## 2022-02-27 RX ADMIN — FAMOTIDINE 20 MG: 20 TABLET, FILM COATED ORAL at 06:20

## 2022-02-27 RX ADMIN — INSULIN LISPRO 3 UNITS: 100 INJECTION, SOLUTION INTRAVENOUS; SUBCUTANEOUS at 16:24

## 2022-02-27 RX ADMIN — Medication 1 CAPSULE: at 08:38

## 2022-02-27 RX ADMIN — CALCIUM POLYCARBOPHIL 1250 MG: 625 TABLET, FILM COATED ORAL at 08:38

## 2022-02-28 LAB
ALBUMIN SERPL-MCNC: 2.2 G/DL (ref 3.5–5.2)
ANION GAP SERPL CALCULATED.3IONS-SCNC: 9.2 MMOL/L (ref 5–15)
APTT PPP: 52.7 SECONDS (ref 22.7–35.4)
BASOPHILS # BLD AUTO: 0.04 10*3/MM3 (ref 0–0.2)
BASOPHILS NFR BLD AUTO: 0.6 % (ref 0–1.5)
BUN SERPL-MCNC: 26 MG/DL (ref 8–23)
BUN/CREAT SERPL: 19.5 (ref 7–25)
CALCIUM SPEC-SCNC: 8.4 MG/DL (ref 8.6–10.5)
CHLORIDE SERPL-SCNC: 104 MMOL/L (ref 98–107)
CO2 SERPL-SCNC: 28.8 MMOL/L (ref 22–29)
CREAT SERPL-MCNC: 1.33 MG/DL (ref 0.76–1.27)
DEPRECATED RDW RBC AUTO: 44.5 FL (ref 37–54)
EOSINOPHIL # BLD AUTO: 0.18 10*3/MM3 (ref 0–0.4)
EOSINOPHIL NFR BLD AUTO: 2.8 % (ref 0.3–6.2)
ERYTHROCYTE [DISTWIDTH] IN BLOOD BY AUTOMATED COUNT: 15.3 % (ref 12.3–15.4)
GFR SERPL CREATININE-BSD FRML MDRD: 51 ML/MIN/1.73
GLUCOSE BLDC GLUCOMTR-MCNC: 137 MG/DL (ref 70–130)
GLUCOSE BLDC GLUCOMTR-MCNC: 173 MG/DL (ref 70–130)
GLUCOSE BLDC GLUCOMTR-MCNC: 228 MG/DL (ref 70–130)
GLUCOSE BLDC GLUCOMTR-MCNC: 288 MG/DL (ref 70–130)
GLUCOSE SERPL-MCNC: 136 MG/DL (ref 65–99)
HCT VFR BLD AUTO: 37.4 % (ref 37.5–51)
HGB BLD-MCNC: 11.8 G/DL (ref 13–17.7)
IMM GRANULOCYTES # BLD AUTO: 0.02 10*3/MM3 (ref 0–0.05)
IMM GRANULOCYTES NFR BLD AUTO: 0.3 % (ref 0–0.5)
INR PPP: 3.11 (ref 0.9–1.1)
LYMPHOCYTES # BLD AUTO: 1.1 10*3/MM3 (ref 0.7–3.1)
LYMPHOCYTES NFR BLD AUTO: 17.1 % (ref 19.6–45.3)
MCH RBC QN AUTO: 25.6 PG (ref 26.6–33)
MCHC RBC AUTO-ENTMCNC: 31.6 G/DL (ref 31.5–35.7)
MCV RBC AUTO: 81.1 FL (ref 79–97)
MONOCYTES # BLD AUTO: 0.49 10*3/MM3 (ref 0.1–0.9)
MONOCYTES NFR BLD AUTO: 7.6 % (ref 5–12)
NEUTROPHILS NFR BLD AUTO: 4.62 10*3/MM3 (ref 1.7–7)
NEUTROPHILS NFR BLD AUTO: 71.6 % (ref 42.7–76)
NRBC BLD AUTO-RTO: 0 /100 WBC (ref 0–0.2)
PHOSPHATE SERPL-MCNC: 2.4 MG/DL (ref 2.5–4.5)
PLATELET # BLD AUTO: 165 10*3/MM3 (ref 140–450)
PMV BLD AUTO: 12 FL (ref 6–12)
POTASSIUM SERPL-SCNC: 3.9 MMOL/L (ref 3.5–5.2)
PROTHROMBIN TIME: 32.2 SECONDS (ref 11.7–14.2)
RBC # BLD AUTO: 4.61 10*6/MM3 (ref 4.14–5.8)
SODIUM SERPL-SCNC: 142 MMOL/L (ref 136–145)
WBC NRBC COR # BLD: 6.45 10*3/MM3 (ref 3.4–10.8)

## 2022-02-28 PROCEDURE — 82962 GLUCOSE BLOOD TEST: CPT

## 2022-02-28 PROCEDURE — 80069 RENAL FUNCTION PANEL: CPT | Performed by: INTERNAL MEDICINE

## 2022-02-28 PROCEDURE — 99232 SBSQ HOSP IP/OBS MODERATE 35: CPT | Performed by: NURSE PRACTITIONER

## 2022-02-28 PROCEDURE — 85025 COMPLETE CBC W/AUTO DIFF WBC: CPT | Performed by: NURSE PRACTITIONER

## 2022-02-28 PROCEDURE — 85730 THROMBOPLASTIN TIME PARTIAL: CPT | Performed by: NURSE PRACTITIONER

## 2022-02-28 PROCEDURE — 63710000001 INSULIN LISPRO (HUMAN) PER 5 UNITS: Performed by: NURSE PRACTITIONER

## 2022-02-28 PROCEDURE — 85610 PROTHROMBIN TIME: CPT | Performed by: NURSE PRACTITIONER

## 2022-02-28 RX ORDER — MONTELUKAST SODIUM 4 MG/1
1 TABLET, CHEWABLE ORAL 2 TIMES DAILY
Status: DISCONTINUED | OUTPATIENT
Start: 2022-02-28 | End: 2022-03-01

## 2022-02-28 RX ADMIN — FUROSEMIDE 40 MG: 40 TABLET ORAL at 08:59

## 2022-02-28 RX ADMIN — Medication 250 MG: at 08:59

## 2022-02-28 RX ADMIN — INSULIN LISPRO 4 UNITS: 100 INJECTION, SOLUTION INTRAVENOUS; SUBCUTANEOUS at 12:37

## 2022-02-28 RX ADMIN — SODIUM BICARBONATE 1300 MG: 650 TABLET ORAL at 21:32

## 2022-02-28 RX ADMIN — METOPROLOL SUCCINATE 12.5 MG: 25 TABLET, EXTENDED RELEASE ORAL at 08:59

## 2022-02-28 RX ADMIN — INSULIN LISPRO 2 UNITS: 100 INJECTION, SOLUTION INTRAVENOUS; SUBCUTANEOUS at 17:09

## 2022-02-28 RX ADMIN — FAMOTIDINE 20 MG: 20 TABLET, FILM COATED ORAL at 06:11

## 2022-02-28 RX ADMIN — VANCOMYCIN HYDROCHLORIDE 125 MG: 125 CAPSULE ORAL at 17:09

## 2022-02-28 RX ADMIN — VANCOMYCIN HYDROCHLORIDE 125 MG: 125 CAPSULE ORAL at 23:57

## 2022-02-28 RX ADMIN — DIGOXIN 125 MCG: 125 TABLET ORAL at 08:58

## 2022-02-28 RX ADMIN — CALCIUM POLYCARBOPHIL 1250 MG: 625 TABLET, FILM COATED ORAL at 17:10

## 2022-02-28 RX ADMIN — FAMOTIDINE 20 MG: 20 TABLET, FILM COATED ORAL at 17:10

## 2022-02-28 RX ADMIN — Medication 250 MG: at 21:32

## 2022-02-28 RX ADMIN — SODIUM BICARBONATE 1300 MG: 650 TABLET ORAL at 08:59

## 2022-02-28 RX ADMIN — SODIUM BICARBONATE 1300 MG: 650 TABLET ORAL at 15:41

## 2022-02-28 RX ADMIN — CALCIUM POLYCARBOPHIL 1250 MG: 625 TABLET, FILM COATED ORAL at 08:58

## 2022-02-28 RX ADMIN — SODIUM CHLORIDE, PRESERVATIVE FREE 10 ML: 5 INJECTION INTRAVENOUS at 09:13

## 2022-02-28 RX ADMIN — ASPIRIN 81 MG: 81 TABLET, COATED ORAL at 08:58

## 2022-02-28 RX ADMIN — ATORVASTATIN CALCIUM 40 MG: 20 TABLET, FILM COATED ORAL at 08:58

## 2022-02-28 RX ADMIN — Medication 1 CAPSULE: at 08:59

## 2022-02-28 RX ADMIN — VANCOMYCIN HYDROCHLORIDE 125 MG: 125 CAPSULE ORAL at 12:38

## 2022-02-28 RX ADMIN — VANCOMYCIN HYDROCHLORIDE 125 MG: 125 CAPSULE ORAL at 06:11

## 2022-02-28 RX ADMIN — WARFARIN 7.5 MG: 7.5 TABLET ORAL at 17:10

## 2022-02-28 RX ADMIN — MONTELUKAST SODIUM 1 G: 4 TABLET, CHEWABLE ORAL at 08:58

## 2022-02-28 RX ADMIN — SODIUM CHLORIDE, PRESERVATIVE FREE 10 ML: 5 INJECTION INTRAVENOUS at 21:32

## 2022-02-28 RX ADMIN — MONTELUKAST SODIUM 1 G: 4 TABLET, CHEWABLE ORAL at 15:41

## 2022-03-01 LAB
ALBUMIN SERPL-MCNC: 2.6 G/DL (ref 3.5–5.2)
ANION GAP SERPL CALCULATED.3IONS-SCNC: 8 MMOL/L (ref 5–15)
APTT PPP: 45.9 SECONDS (ref 22.7–35.4)
BASOPHILS # BLD AUTO: 0.05 10*3/MM3 (ref 0–0.2)
BASOPHILS NFR BLD AUTO: 0.8 % (ref 0–1.5)
BUN SERPL-MCNC: 28 MG/DL (ref 8–23)
BUN/CREAT SERPL: 22.8 (ref 7–25)
CALCIUM SPEC-SCNC: 8.3 MG/DL (ref 8.6–10.5)
CHLORIDE SERPL-SCNC: 100 MMOL/L (ref 98–107)
CO2 SERPL-SCNC: 31 MMOL/L (ref 22–29)
CREAT SERPL-MCNC: 1.23 MG/DL (ref 0.76–1.27)
DEPRECATED RDW RBC AUTO: 46.8 FL (ref 37–54)
EGFRCR SERPLBLD CKD-EPI 2021: 57.9 ML/MIN/1.73
EOSINOPHIL # BLD AUTO: 0.19 10*3/MM3 (ref 0–0.4)
EOSINOPHIL NFR BLD AUTO: 3.1 % (ref 0.3–6.2)
ERYTHROCYTE [DISTWIDTH] IN BLOOD BY AUTOMATED COUNT: 15.6 % (ref 12.3–15.4)
GLUCOSE BLDC GLUCOMTR-MCNC: 153 MG/DL (ref 70–130)
GLUCOSE BLDC GLUCOMTR-MCNC: 174 MG/DL (ref 70–130)
GLUCOSE BLDC GLUCOMTR-MCNC: 213 MG/DL (ref 70–130)
GLUCOSE BLDC GLUCOMTR-MCNC: 319 MG/DL (ref 70–130)
GLUCOSE SERPL-MCNC: 144 MG/DL (ref 65–99)
HCT VFR BLD AUTO: 38.1 % (ref 37.5–51)
HGB BLD-MCNC: 11.7 G/DL (ref 13–17.7)
IMM GRANULOCYTES # BLD AUTO: 0.02 10*3/MM3 (ref 0–0.05)
IMM GRANULOCYTES NFR BLD AUTO: 0.3 % (ref 0–0.5)
INR PPP: 2.89 (ref 0.9–1.1)
LYMPHOCYTES # BLD AUTO: 0.92 10*3/MM3 (ref 0.7–3.1)
LYMPHOCYTES NFR BLD AUTO: 15.1 % (ref 19.6–45.3)
MAGNESIUM SERPL-MCNC: 2.1 MG/DL (ref 1.6–2.4)
MCH RBC QN AUTO: 25.4 PG (ref 26.6–33)
MCHC RBC AUTO-ENTMCNC: 30.7 G/DL (ref 31.5–35.7)
MCV RBC AUTO: 82.6 FL (ref 79–97)
MONOCYTES # BLD AUTO: 0.48 10*3/MM3 (ref 0.1–0.9)
MONOCYTES NFR BLD AUTO: 7.9 % (ref 5–12)
NEUTROPHILS NFR BLD AUTO: 4.42 10*3/MM3 (ref 1.7–7)
NEUTROPHILS NFR BLD AUTO: 72.8 % (ref 42.7–76)
NRBC BLD AUTO-RTO: 0 /100 WBC (ref 0–0.2)
PHOSPHATE SERPL-MCNC: 2.6 MG/DL (ref 2.5–4.5)
PLATELET # BLD AUTO: 162 10*3/MM3 (ref 140–450)
PMV BLD AUTO: 12 FL (ref 6–12)
POTASSIUM SERPL-SCNC: 3.9 MMOL/L (ref 3.5–5.2)
PROTHROMBIN TIME: 30.4 SECONDS (ref 11.7–14.2)
RBC # BLD AUTO: 4.61 10*6/MM3 (ref 4.14–5.8)
SODIUM SERPL-SCNC: 139 MMOL/L (ref 136–145)
URATE SERPL-MCNC: 7.2 MG/DL (ref 3.4–7)
WBC NRBC COR # BLD: 6.08 10*3/MM3 (ref 3.4–10.8)

## 2022-03-01 PROCEDURE — 99232 SBSQ HOSP IP/OBS MODERATE 35: CPT | Performed by: NURSE PRACTITIONER

## 2022-03-01 PROCEDURE — 63710000001 INSULIN LISPRO (HUMAN) PER 5 UNITS: Performed by: NURSE PRACTITIONER

## 2022-03-01 PROCEDURE — 83735 ASSAY OF MAGNESIUM: CPT | Performed by: INTERNAL MEDICINE

## 2022-03-01 PROCEDURE — 80069 RENAL FUNCTION PANEL: CPT | Performed by: INTERNAL MEDICINE

## 2022-03-01 PROCEDURE — 82962 GLUCOSE BLOOD TEST: CPT

## 2022-03-01 PROCEDURE — 85025 COMPLETE CBC W/AUTO DIFF WBC: CPT | Performed by: NURSE PRACTITIONER

## 2022-03-01 PROCEDURE — 85610 PROTHROMBIN TIME: CPT | Performed by: NURSE PRACTITIONER

## 2022-03-01 PROCEDURE — 97116 GAIT TRAINING THERAPY: CPT

## 2022-03-01 PROCEDURE — 85730 THROMBOPLASTIN TIME PARTIAL: CPT | Performed by: NURSE PRACTITIONER

## 2022-03-01 PROCEDURE — 84550 ASSAY OF BLOOD/URIC ACID: CPT | Performed by: INTERNAL MEDICINE

## 2022-03-01 RX ORDER — MONTELUKAST SODIUM 4 MG/1
1 TABLET, CHEWABLE ORAL
Status: DISCONTINUED | OUTPATIENT
Start: 2022-03-01 | End: 2022-03-02 | Stop reason: HOSPADM

## 2022-03-01 RX ADMIN — FAMOTIDINE 20 MG: 20 TABLET, FILM COATED ORAL at 17:56

## 2022-03-01 RX ADMIN — METOPROLOL SUCCINATE 12.5 MG: 25 TABLET, EXTENDED RELEASE ORAL at 08:49

## 2022-03-01 RX ADMIN — FERROUS SULFATE TAB 325 MG (65 MG ELEMENTAL FE) 325 MG: 325 (65 FE) TAB at 08:48

## 2022-03-01 RX ADMIN — FAMOTIDINE 20 MG: 20 TABLET, FILM COATED ORAL at 05:45

## 2022-03-01 RX ADMIN — Medication 250 MG: at 21:55

## 2022-03-01 RX ADMIN — CALCIUM POLYCARBOPHIL 1250 MG: 625 TABLET, FILM COATED ORAL at 17:56

## 2022-03-01 RX ADMIN — WARFARIN 5 MG: 5 TABLET ORAL at 17:56

## 2022-03-01 RX ADMIN — INSULIN LISPRO 5 UNITS: 100 INJECTION, SOLUTION INTRAVENOUS; SUBCUTANEOUS at 12:31

## 2022-03-01 RX ADMIN — Medication 250 MG: at 08:49

## 2022-03-01 RX ADMIN — SODIUM CHLORIDE, PRESERVATIVE FREE 10 ML: 5 INJECTION INTRAVENOUS at 22:12

## 2022-03-01 RX ADMIN — SODIUM CHLORIDE, PRESERVATIVE FREE 10 ML: 5 INJECTION INTRAVENOUS at 12:32

## 2022-03-01 RX ADMIN — Medication 1 CAPSULE: at 08:49

## 2022-03-01 RX ADMIN — INSULIN LISPRO 2 UNITS: 100 INJECTION, SOLUTION INTRAVENOUS; SUBCUTANEOUS at 17:56

## 2022-03-01 RX ADMIN — SODIUM BICARBONATE 1300 MG: 650 TABLET ORAL at 21:55

## 2022-03-01 RX ADMIN — DIGOXIN 125 MCG: 125 TABLET ORAL at 08:48

## 2022-03-01 RX ADMIN — VANCOMYCIN HYDROCHLORIDE 125 MG: 125 CAPSULE ORAL at 05:45

## 2022-03-01 RX ADMIN — MONTELUKAST SODIUM 1 G: 4 TABLET, CHEWABLE ORAL at 17:58

## 2022-03-01 RX ADMIN — ATORVASTATIN CALCIUM 40 MG: 20 TABLET, FILM COATED ORAL at 08:49

## 2022-03-01 RX ADMIN — SODIUM BICARBONATE 1300 MG: 650 TABLET ORAL at 08:49

## 2022-03-01 RX ADMIN — MONTELUKAST SODIUM 1 G: 4 TABLET, CHEWABLE ORAL at 08:48

## 2022-03-01 RX ADMIN — SODIUM BICARBONATE 1300 MG: 650 TABLET ORAL at 17:57

## 2022-03-01 RX ADMIN — FUROSEMIDE 40 MG: 40 TABLET ORAL at 08:49

## 2022-03-01 RX ADMIN — VANCOMYCIN HYDROCHLORIDE 125 MG: 125 CAPSULE ORAL at 12:31

## 2022-03-01 RX ADMIN — VANCOMYCIN HYDROCHLORIDE 125 MG: 125 CAPSULE ORAL at 17:56

## 2022-03-01 RX ADMIN — CALCIUM POLYCARBOPHIL 1250 MG: 625 TABLET, FILM COATED ORAL at 08:48

## 2022-03-01 RX ADMIN — ASPIRIN 81 MG: 81 TABLET, COATED ORAL at 08:49

## 2022-03-01 NOTE — NURSING NOTE
Pt does not want to wear SCDs. He has been getting is warfarin. SCDs were removed per pt request.

## 2022-03-01 NOTE — PROGRESS NOTES
[facility] Progress Note      Patient Name: Francisco Sequeira  : 1935  MRN: [id number]  Primary Care Physician:  Rubin Hinkle APRN  Date of admission: 2022    Subjective     Interval History:   F/u BYRON on  CKD3  The patient is feeling the same, appetite is reasonably good today, no chest pain or shortness of air, no orthopnea or PND, no nausea or vomiting, no abdominal pain, continue to have recurrent diarrhea and fecal incontinence    Review of Systems:   As noted above    Objective     Vitals:   Temp:  [96.7 °F (35.9 °C)-98.2 °F (36.8 °C)] 97.2 °F (36.2 °C)  Heart Rate:  [58-73] 73  Resp:  [16-18] 18  BP: (116-141)/(54-72) 123/54    Intake/Output Summary (Last 24 hours) at 3/1/2022 0903  Last data filed at 3/1/2022 0002  Gross per 24 hour   Intake 320 ml   Output --   Net 320 ml       Physical Exam:    General Appearance: alert, oriented x 3, no acute distress,, appears to be chronically ill  Skin: warm and dry  HEENT: pupils round and reactive to light, oral mucosa normal,, temporal wasting nonicteric sclera  Neck: supple, no JVD, trachea midline  Lungs: CTA, unlabored breathing effort  Heart: Irregularly irregular, no rub  Abdomen: soft, nontender, normoactive bowels  : no palpable bladder,  Extremities: no edema, cyanosis or clubbing  Neuro: normal speech and mental status          Scheduled Meds:     aspirin, 81 mg, Oral, Daily  atorvastatin, 40 mg, Oral, Daily  colestipol, 1 g, Oral, BID  digoxin, 125 mcg, Oral, Daily  famotidine, 20 mg, Oral, BID AC  ferrous sulfate, 325 mg, Oral, Every Other Day  furosemide, 40 mg, Oral, Daily  insulin lispro, 0-7 Units, Subcutaneous, TID AC  lactobacillus acidophilus, 1 capsule, Oral, Daily  metoprolol succinate XL, 12.5 mg, Oral, Daily  calcium polycarbophil, 1,250 mg, Oral, BID With Meals  saccharomyces boulardii, 250 mg, Oral, BID  sodium bicarbonate, 1,300 mg, Oral, TID  sodium chloride, 10 mL, Intravenous,  Q12H  vancomycin, 125 mg, Oral, Q6H  [START ON 3/5/2022] vancomycin, 125 mg, Oral, Q12H  [START ON 3/12/2022] vancomycin, 125 mg, Oral, Q24H  [START ON 3/19/2022] vancomycin, 125 mg, Oral, Every Other Day  warfarin, 5 mg, Oral, Once per day on Sun Tue Thu Sat  warfarin, 7.5 mg, Oral, Once per day on Mon Wed Fri      IV Meds:   Pharmacy Consult,   Pharmacy to dose warfarin,         Results Reviewed:   I have personally reviewed the results from the time of this admission to 3/1/2022 09:03 EST     Results from last 7 days   Lab Units 03/01/22 0537 02/28/22 0531 02/27/22  0533   SODIUM mmol/L 139 142 136   POTASSIUM mmol/L 3.9 3.9 3.8   CHLORIDE mmol/L 100 104 99   CO2 mmol/L 31.0* 28.8 31.0*   BUN mg/dL 28* 26* 19   CREATININE mg/dL 1.23 1.33* 1.17   CALCIUM mg/dL 8.3* 8.4* 8.2*   GLUCOSE mg/dL 144* 136* 129*     Estimated Creatinine Clearance: 40.8 mL/min (by C-G formula based on SCr of 1.23 mg/dL).  Results from last 7 days   Lab Units 03/01/22 0537 02/28/22 0531 02/27/22  0533   MAGNESIUM mg/dL 2.1  --   --    PHOSPHORUS mg/dL 2.6 2.4* 2.7     Results from last 7 days   Lab Units 03/01/22 0537   URIC ACID mg/dL 7.2*     Results from last 7 days   Lab Units 03/01/22 0537 02/28/22  0531 02/27/22  0533 02/26/22  0433 02/25/22  0542   WBC 10*3/mm3 6.08 6.45 5.89 6.13 6.02   HEMOGLOBIN g/dL 11.7* 11.8* 12.2* 12.2* 12.0*   PLATELETS 10*3/mm3 162 165 178 177 175     Results from last 7 days   Lab Units 03/01/22 0537 02/28/22  0531 02/27/22  0533 02/26/22  0433 02/25/22  0542   INR  2.89* 3.11* 3.48* 3.30* 3.37*       Assessment / Plan     ASSESSMENT:  1. BYRON, CKD 3.  BYRON resolved, Cr stable 1.1 - 1.2 range (BL Cr 1.2 - 1.4).  Creatinine today 1.23, electrolyte within acceptable range  2. C. Dif colitis, he had recurrent diarrhea followed by GI, treated with oral vancomycin  3. Chronic diastolic heart failure - compensated on oral Lasix  4, Afib, controlled rate  5. SP AVR, mechanical.  6. DM2, with renal  complication  7.  Sarcopenia and protein calorie malnutrition Albumin 2.6, on protein supplement    PLAN:  1.  Continue the same treatment and continue protein supplement  2.  Surveillance labs      Jeff Dominguez MD  03/01/22  09:03 [facility]    [facility]  [phone]

## 2022-03-01 NOTE — PLAN OF CARE
Goal Outcome Evaluation:  Plan of Care Reviewed With: patient, spouse        Progress: improving  Outcome Summary: Pt agreeable to PT this PM and was on BSC upon PT arrival. Pt completed STS from BSC with SV and ambulated 150ft in hallway with cane. Pt demonstrated overall improved steadiness with cane and reported feeling safer than the last time he walked with a cane. Spouse inquiring about ambulating with cane in hallway and PT encouraged to still use walker for safety, but if wanting to trial using cane again to walk with nursing - pt and spouse verbalized understanding and RN aware. Pt will f/u with pt if not D/C by end of week to ambulate with cane 1 additional time if needed, but pt is safe to D/C home with HHPT or OPPT when medically appropriate.    Patient was intermittently wearing a face mask during this therapy encounter. Therapist used appropriate personal protective equipment including eye protection, mask, and gloves.  Mask used was standard procedure mask. Appropriate PPE was worn during the entire therapy session. Hand hygiene was completed before and after therapy session. Patient is not in enhanced droplet precautions.

## 2022-03-01 NOTE — PLAN OF CARE
Goal Outcome Evaluation:  Plan of Care Reviewed With: patient        Progress: improving  Outcome Summary: pt had very small BM this shift. up to BSC. educated patient on the importance of getting up to BSC to have a BM instead of going in brief. incontinent of bladder. VSS. room air. oral vanc given. will ctm.

## 2022-03-01 NOTE — PROGRESS NOTES
"DAILY PROGRESS NOTE  [facility]    Patient Identification:  Name: Francisco Sequeira  Age: 86 y.o.  Sex: male  :  1935  MRN: [id number]         Primary Care Physician: Rubin Hinkle APRN    Subjective:  Interval History:Diarrhea is better.  He is still very weak.  Still incontinence of stool.    Objective:    Scheduled Meds:aspirin, 81 mg, Oral, Daily  atorvastatin, 40 mg, Oral, Daily  colestipol, 1 g, Oral, BID  digoxin, 125 mcg, Oral, Daily  famotidine, 20 mg, Oral, BID AC  ferrous sulfate, 325 mg, Oral, Every Other Day  furosemide, 40 mg, Oral, Daily  insulin lispro, 0-7 Units, Subcutaneous, TID AC  lactobacillus acidophilus, 1 capsule, Oral, Daily  metoprolol succinate XL, 12.5 mg, Oral, Daily  calcium polycarbophil, 1,250 mg, Oral, BID With Meals  saccharomyces boulardii, 250 mg, Oral, BID  sodium bicarbonate, 1,300 mg, Oral, TID  sodium chloride, 10 mL, Intravenous, Q12H  vancomycin, 125 mg, Oral, Q6H  [START ON 3/5/2022] vancomycin, 125 mg, Oral, Q12H  [START ON 3/12/2022] vancomycin, 125 mg, Oral, Q24H  [START ON 3/19/2022] vancomycin, 125 mg, Oral, Every Other Day  warfarin, 5 mg, Oral, Once per day on Sun Tue Thu Sat  warfarin, 7.5 mg, Oral, Once per day on       Continuous Infusions:Pharmacy Consult,   Pharmacy to dose warfarin,         Vital signs in last 24 hours:  Temp:  [96.7 °F (35.9 °C)-98.2 °F (36.8 °C)] 97.2 °F (36.2 °C)  Heart Rate:  [58-73] 73  Resp:  [16-18] 18  BP: (116-141)/(54-72) 123/54    Intake/Output:    Intake/Output Summary (Last 24 hours) at 3/1/2022 1026  Last data filed at 3/1/2022 0002  Gross per 24 hour   Intake 320 ml   Output --   Net 320 ml       Exam:  /54 (BP Location: Left arm, Patient Position: Sitting)   Pulse 73   Temp 97.2 °F (36.2 °C) (Oral)   Resp 18   Ht 182.9 cm (72.01\")   Wt 64.6 kg (142 lb 6.4 oz)   SpO2 98%   BMI 19.31 kg/m²     General Appearance:    Alert, cooperative, no distress   Head:    Normocephalic, without " obvious abnormality, atraumatic   Eyes:       Throat:   Lips, tongue, gums normal   Neck:   Supple, symmetrical, trachea midline, no JVD   Lungs:     Clear to auscultation bilaterally, respirations unlabored   Chest Wall:    No tenderness or deformity    Heart:    Regular rate and rhythm, S1 and S2 normal, no murmur,no  Rub or gallop   Abdomen:     Soft, nontender, bowel sounds active, no masses, no organomegaly    Extremities:   Extremities normal, atraumatic, no cyanosis or edema   Pulses:      Skin:   Skin is warm and dry,  no rashes or palpable lesions   Neurologic:   no focal deficits noted      Lab Results (last 72 hours)     Procedure Component Value Units Date/Time    POC Glucose Once [[id number]]  (Abnormal) Collected: 02/25/22 1109    Specimen: Blood Updated: 02/25/22 1111     Glucose 210 mg/dL      Comment: Meter: [id number] : [id number] Mary ALEJO       Renal Function Panel [[id number]]  (Abnormal) Collected: 02/25/22 0542    Specimen: Blood Updated: 02/25/22 0636     Glucose 129 mg/dL      BUN 19 mg/dL      Creatinine 1.26 mg/dL      Sodium 140 mmol/L      Potassium 3.8 mmol/L      Chloride 103 mmol/L      CO2 30.9 mmol/L      Calcium 8.5 mg/dL      Albumin 2.30 g/dL      Phosphorus 2.7 mg/dL      Anion Gap 6.1 mmol/L      BUN/Creatinine Ratio 15.1     eGFR Non African Amer 55 mL/min/1.73     Narrative:      GFR Normal >60  Chronic Kidney Disease <60  Kidney Failure <15      POC Glucose Once [[id number]]  (Abnormal) Collected: 02/25/22 0627    Specimen: Blood Updated: 02/25/22 0628     Glucose 134 mg/dL      Comment: Meter: [id number] : [id number] Lucian BURKS       aPTT [[id number]]  (Abnormal) Collected: 02/25/22 0542    Specimen: Blood Updated: 02/25/22 0624     PTT 56.1 seconds     Protime-INR [[id number]]  (Abnormal) Collected: 02/25/22 0542    Specimen: Blood Updated: 02/25/22 0624     Protime 34.4 Seconds      INR 3.37    CBC & Differential [[id number]]  (Abnormal) Collected: 02/25/22  0542    Specimen: Blood Updated: 02/25/22 0609    Narrative:      The following orders were created for panel order CBC & Differential.  Procedure                               Abnormality         Status                     ---------                               -----------         ------                     CBC Auto Differential[[id number]]        Abnormal            Final result                 Please view results for these tests on the individual orders.    CBC Auto Differential [[id number]]  (Abnormal) Collected: 02/25/22 0542    Specimen: Blood Updated: 02/25/22 0609     WBC 6.02 10*3/mm3      RBC 4.62 10*6/mm3      Hemoglobin 12.0 g/dL      Hematocrit 37.7 %      MCV 81.6 fL      MCH 26.0 pg      MCHC 31.8 g/dL      RDW 15.6 %      RDW-SD 46.3 fl      MPV 10.9 fL      Platelets 175 10*3/mm3      Neutrophil % 74.7 %      Lymphocyte % 14.3 %      Monocyte % 6.8 %      Eosinophil % 3.0 %      Basophil % 0.7 %      Immature Grans % 0.5 %      Neutrophils, Absolute 4.50 10*3/mm3      Lymphocytes, Absolute 0.86 10*3/mm3      Monocytes, Absolute 0.41 10*3/mm3      Eosinophils, Absolute 0.18 10*3/mm3      Basophils, Absolute 0.04 10*3/mm3      Immature Grans, Absolute 0.03 10*3/mm3      nRBC 0.0 /100 WBC     POC Glucose Once [[id number]]  (Abnormal) Collected: 02/24/22 2025    Specimen: Blood Updated: 02/24/22 2026     Glucose 152 mg/dL      Comment: Meter: [id number] : [id number] Lucian BURKS       POC Glucose Once [[id number]]  (Abnormal) Collected: 02/24/22 1621    Specimen: Blood Updated: 02/24/22 1622     Glucose 213 mg/dL      Comment: Meter: [id number] : [id number] Valerie ALEJO       POC Glucose Once [[id number]]  (Abnormal) Collected: 02/24/22 1114    Specimen: Blood Updated: 02/24/22 1118     Glucose 192 mg/dL      Comment: Meter: [id number] : [id number] Valerie ALEJO       Clostridium Difficile EIA - Stool, Per Rectum [[id number]] Collected: 02/24/22 0625    Specimen: Stool from Per Rectum  Updated: 02/24/22 0637    POC Glucose Once [[id number]]  (Abnormal) Collected: 02/24/22 0623    Specimen: Blood Updated: 02/24/22 0630     Glucose 142 mg/dL      Comment: Meter: [id number] : [id number] Lucian BURKS       Renal Function Panel [[id number]]  (Abnormal) Collected: 02/24/22 0444    Specimen: Blood Updated: 02/24/22 0557     Glucose 125 mg/dL      BUN 17 mg/dL      Creatinine 0.92 mg/dL      Sodium 140 mmol/L      Potassium 3.8 mmol/L      Chloride 105 mmol/L      CO2 26.4 mmol/L      Calcium 8.2 mg/dL      Albumin 2.20 g/dL      Phosphorus 2.7 mg/dL      Anion Gap 8.6 mmol/L      BUN/Creatinine Ratio 18.5     eGFR Non African Amer 78 mL/min/1.73     Narrative:      GFR Normal >60  Chronic Kidney Disease <60  Kidney Failure <15      aPTT [[id number]]  (Abnormal) Collected: 02/24/22 0444    Specimen: Blood Updated: 02/24/22 0538     PTT 59.0 seconds     Protime-INR [[id number]]  (Abnormal) Collected: 02/24/22 0444    Specimen: Blood Updated: 02/24/22 0538     Protime 34.4 Seconds      INR 3.37    CBC & Differential [[id number]]  (Abnormal) Collected: 02/24/22 0444    Specimen: Blood Updated: 02/24/22 0531    Narrative:      The following orders were created for panel order CBC & Differential.  Procedure                               Abnormality         Status                     ---------                               -----------         ------                     CBC Auto Differential[[id number]]        Abnormal            Final result                 Please view results for these tests on the individual orders.    CBC Auto Differential [[id number]]  (Abnormal) Collected: 02/24/22 0444    Specimen: Blood Updated: 02/24/22 0531     WBC 6.15 10*3/mm3      RBC 4.60 10*6/mm3      Hemoglobin 11.9 g/dL      Hematocrit 36.0 %      MCV 78.3 fL      MCH 25.9 pg      MCHC 33.1 g/dL      RDW 15.7 %      RDW-SD 43.8 fl      MPV 10.9 fL      Platelets 161 10*3/mm3      Neutrophil % 74.6 %      Lymphocyte % 13.8 %       Monocyte % 7.5 %      Eosinophil % 3.1 %      Basophil % 0.7 %      Immature Grans % 0.3 %      Neutrophils, Absolute 4.59 10*3/mm3      Lymphocytes, Absolute 0.85 10*3/mm3      Monocytes, Absolute 0.46 10*3/mm3      Eosinophils, Absolute 0.19 10*3/mm3      Basophils, Absolute 0.04 10*3/mm3      Immature Grans, Absolute 0.02 10*3/mm3      nRBC 0.0 /100 WBC     POC Glucose Once [[id number]]  (Abnormal) Collected: 02/23/22 2026    Specimen: Blood Updated: 02/23/22 2028     Glucose 241 mg/dL      Comment: Meter: [id number] : [id number] Lucian BURKS       POC Glucose Once [[id number]]  (Abnormal) Collected: 02/23/22 1605    Specimen: Blood Updated: 02/23/22 1612     Glucose 141 mg/dL      Comment: Meter: [id number] : [id number] Mary ALEJO       Urinalysis With Culture If Indicated - Urine, Clean Catch [[id number]]  (Normal) Collected: 02/23/22 1123    Specimen: Urine, Clean Catch Updated: 02/23/22 1140     Color, UA Yellow     Appearance, UA Clear     pH, UA <=5.0     Specific Gravity, UA 1.007     Glucose, UA Negative     Ketones, UA Negative     Bilirubin, UA Negative     Blood, UA Negative     Protein, UA Negative     Leuk Esterase, UA Negative     Nitrite, UA Negative     Urobilinogen, UA 0.2 E.U./dL    Narrative:      Urine microscopic not indicated.    POC Glucose Once [[id number]]  (Abnormal) Collected: 02/23/22 1123    Specimen: Blood Updated: 02/23/22 1128     Glucose 168 mg/dL      Comment: Meter: [id number] : [id number] Mary ALEJO       POC Glucose Once [[id number]]  (Normal) Collected: 02/23/22 0716    Specimen: Blood Updated: 02/23/22 0717     Glucose 108 mg/dL      Comment: Meter: [id number] : [id number] Marisela ALEJO       Renal Function Panel [[id number]]  (Abnormal) Collected: 02/23/22 0400    Specimen: Blood Updated: 02/23/22 0456     Glucose 115 mg/dL      BUN 14 mg/dL      Creatinine 0.93 mg/dL      Sodium 140 mmol/L      Potassium 3.7 mmol/L      Chloride 106 mmol/L       CO2 28.0 mmol/L      Calcium 8.1 mg/dL      Albumin 2.20 g/dL      Phosphorus 2.8 mg/dL      Anion Gap 6.0 mmol/L      BUN/Creatinine Ratio 15.1     eGFR Non African Amer 77 mL/min/1.73     Narrative:      GFR Normal >60  Chronic Kidney Disease <60  Kidney Failure <15      aPTT [[id number]]  (Abnormal) Collected: 02/23/22 0400    Specimen: Blood Updated: 02/23/22 0444     PTT 54.6 seconds     Protime-INR [[id number]]  (Abnormal) Collected: 02/23/22 0400    Specimen: Blood Updated: 02/23/22 0444     Protime 30.3 Seconds      INR 2.87    CBC & Differential [[id number]]  (Abnormal) Collected: 02/23/22 0400    Specimen: Blood Updated: 02/23/22 0433    Narrative:      The following orders were created for panel order CBC & Differential.  Procedure                               Abnormality         Status                     ---------                               -----------         ------                     CBC Auto Differential[[id number]]        Abnormal            Final result                 Please view results for these tests on the individual orders.    CBC Auto Differential [[id number]]  (Abnormal) Collected: 02/23/22 0400    Specimen: Blood Updated: 02/23/22 0433     WBC 5.78 10*3/mm3      RBC 4.59 10*6/mm3      Hemoglobin 11.8 g/dL      Hematocrit 37.1 %      MCV 80.8 fL      MCH 25.7 pg      MCHC 31.8 g/dL      RDW 15.6 %      RDW-SD 45.3 fl      MPV 10.3 fL      Platelets 165 10*3/mm3      Neutrophil % 72.1 %      Lymphocyte % 15.1 %      Monocyte % 8.3 %      Eosinophil % 3.3 %      Basophil % 0.7 %      Immature Grans % 0.5 %      Neutrophils, Absolute 4.17 10*3/mm3      Lymphocytes, Absolute 0.87 10*3/mm3      Monocytes, Absolute 0.48 10*3/mm3      Eosinophils, Absolute 0.19 10*3/mm3      Basophils, Absolute 0.04 10*3/mm3      Immature Grans, Absolute 0.03 10*3/mm3      nRBC 0.0 /100 WBC     POC Glucose Once [[id number]]  (Abnormal) Collected: 02/22/22 2144    Specimen: Blood Updated: 02/22/22 2145     Glucose  140 mg/dL      Comment: Meter: [id number] : [id number] Mariselaamos Jaime MELO       POC Glucose Once [[id number]]  (Abnormal) Collected: 02/22/22 1615    Specimen: Blood Updated: 02/22/22 1617     Glucose 157 mg/dL      Comment: Meter: [id number] : [id number] Bhardwajshannon Jett MELO           Data Review:  Results from last 7 days   Lab Units 03/01/22  0537 02/28/22  0531 02/28/22  0531 02/27/22  0533 02/27/22  0533   SODIUM mmol/L 139  --  142  --  136   POTASSIUM mmol/L 3.9  --  3.9  --  3.8   CHLORIDE mmol/L 100  --  104  --  99   CO2 mmol/L 31.0*  --  28.8  --  31.0*   BUN mg/dL 28*  --  26*  --  19   CREATININE mg/dL 1.23  --  1.33*  --  1.17   GLUCOSE mg/dL 144*   < > 136*   < > 129*   CALCIUM mg/dL 8.3*  --  8.4*  --  8.2*    < > = values in this interval not displayed.     Results from last 7 days   Lab Units 03/01/22  0537 02/28/22  0531 02/27/22  0533   WBC 10*3/mm3 6.08 6.45 5.89   HEMOGLOBIN g/dL 11.7* 11.8* 12.2*   HEMATOCRIT % 38.1 37.4* 38.8   PLATELETS 10*3/mm3 162 165 178             Lab Results   Lab Value Date/Time    TROPONINT 0.027 02/18/2022 2305    TROPONINT 0.011 10/23/2021 1617    TROPONINT 0.017 07/15/2021 2029    TROPONINT <0.010 01/13/2020 0747    TROPONINT <0.010 03/30/2017 0809               Invalid input(s): PROT, LABALBU          Glucose   Date/Time Value Ref Range Status   03/01/2022 0625 153 (H) 70 - 130 mg/dL Final     Comment:     Meter: [id number] : [id number] [employer] [facility]   02/28/2022 2030 228 (H) 70 - 130 mg/dL Final     Comment:     Meter: [id number] : [id number] [employer] [facility]   02/28/2022 1632 173 (H) 70 - 130 mg/dL Final     Comment:     Meter: [id number] : [id number] Ricky Schmidt NA   02/28/2022 1141 288 (H) 70 - 130 mg/dL Final     Comment:     Meter: [id number] : [id number] Ricky Schmidt NA   02/28/2022 0629 137 (H) 70 - 130 mg/dL Final     Comment:     Meter: [id number] : [id number] Lucian Laura [facility]   02/27/2022 2006 194 (H) 70 - 130 mg/dL Final      Comment:     Meter: [id number] : [id number] Lucian Sanchez [facility]   02/27/2022 1614 210 (H) 70 - 130 mg/dL Final     Comment:     Meter: [id number] : [id number] Goodman Jett NA   02/27/2022 1105 269 (H) 70 - 130 mg/dL Final     Comment:     RN Notified R and V Meter: [id number] : [id number] Mary ALEJO     Results from last 7 days   Lab Units 03/01/22  0537 02/28/22  0531 02/27/22  0533   INR  2.89* 3.11* 3.48*       Past Medical History:   Diagnosis Date   • Allergic rhinitis    • Aortic valve insufficiency    • Ascending aortic aneurysm (HCC)    • Atrial fibrillation (HCC)    • Bacteremia    • Calcific aortic stenosis of bicuspid valve    • Cardiac arrest (HCC)    • Cardiomyopathy (HCC)    • CKD (chronic kidney disease) stage 3, GFR 30-59 ml/min (HCC)    • Contact dermatitis due to poison ivy    • Elbow fracture    • Esophageal reflux    • GERD (gastroesophageal reflux disease)    • Head injury    • History of transfusion    • Hyperglycemia    • Hyperlipidemia    • Hypertension    • Kidney carcinoma (HCC)    • Nonischemic cardiomyopathy (HCC)    • Renal oncocytoma    • Seasonal allergic reaction    • Sinusitis    • Syncope    • Type 2 diabetes mellitus (HCC)     uncontrolled   • Visual field defect        Assessment:  Active Hospital Problems    Diagnosis  POA   • **C. 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        Plan:  Continue with current RX as per multiple consults.Follow lab.  DC planning.    Leobardo Juarez MD  3/1/2022  10:26 EST

## 2022-03-01 NOTE — THERAPY TREATMENT NOTE
Patient Name: Francisco Sequeira  : 1935    MRN: [id number]                              Today's Date: 3/1/2022       Admit Date: 2022    Visit Dx:     ICD-10-CM ICD-9-CM   1. C. difficile colitis  A04.72 008.45   2. Leukocytosis, unspecified type  D72.829 288.60   3. Acute renal failure superimposed on chronic kidney disease, unspecified CKD stage, unspecified acute renal failure type (HCC)  N17.9 584.9    N18.9 585.9   4. On Coumadin for atrial fibrillation (HCC)  I48.91 427.31    Z79.01 V58.61   5. Primary hypertension  I10 401.9   6. Hyperlipidemia, unspecified hyperlipidemia type  E78.5 272.4   7. 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 difficile colitis     Past Medical History:   Diagnosis Date   • Allergic rhinitis    • Aortic valve insufficiency    • Ascending aortic aneurysm (HCC)    • Atrial fibrillation (HCC)    • Bacteremia    • Calcific aortic stenosis of bicuspid valve    • Cardiac arrest (HCC)    • Cardiomyopathy (HCC)    • CKD  (chronic kidney disease) stage 3, GFR 30-59 ml/min (HCC)    • Contact dermatitis due to poison ivy    • Elbow fracture    • Esophageal reflux    • GERD (gastroesophageal reflux disease)    • Head injury    • History of transfusion    • Hyperglycemia    • Hyperlipidemia    • Hypertension    • Kidney carcinoma (HCC)    • Nonischemic cardiomyopathy (HCC)    • Renal oncocytoma    • Seasonal allergic reaction    • Sinusitis    • Syncope    • Type 2 diabetes mellitus (HCC)     uncontrolled   • Visual field defect      Past Surgical History:   Procedure Laterality Date   • AORTIC VALVE REPAIR/REPLACEMENT     • ASCENDING AORTIC ANEURYSM REPAIR W/ MECHANICAL AORTIC VALVE REPLACEMENT     • COLONOSCOPY N/A 10/28/2021    Procedure: COLONOSCOPY to cecum:  cold snare polyps,;  Surgeon: Dinesh Meza MD;  Location:  [facility];  Service: Gastroenterology;  Laterality: N/A;  pre:  Iron deficiency anemia  post:  polyps, diverticulosis,    • COLONOSCOPY N/A 11/9/2021    Procedure: COLONOSCOPY to cecum with APC cautery of AVM and clip placement x1;  Surgeon: Pavan Rodriguez MD;  Location: [facility];  Service: Gastroenterology;  Laterality: N/A;  PRE - gi bleed, anemia  POST - diverticulosis, poor prep, AVM right colon   • ENDOSCOPY N/A 10/28/2021    Procedure: ESOPHAGOGASTRODUODENOSCOPY with biopsies;  Surgeon: Dinesh Meza MD;  Location: [facility];  Service: Gastroenterology;  Laterality: N/A;  pre:  Iron deficiency anemia  post:  duodenitis and gastritis   • EYE SURGERY  12/09/2020    cataract surgery    • NEPHRECTOMY     • OTHER SURGICAL HISTORY      elbow surgery   • OTHER SURGICAL HISTORY      right arm surgery   • PROSTATE SURGERY     • THORACENTESIS Left     diagnostic      General Information     Row Name 03/01/22 6282          Physical Therapy Time and Intention    Document Type therapy note (daily note)  -     Mode of Treatment physical therapy; individual therapy  -     Row  Name 03/01/22 1624          General Information    Patient Profile Reviewed yes  -     Existing Precautions/Restrictions fall  -     Row Name 03/01/22 1624          Cognition    Orientation Status (Cognition) oriented x 4  -     Row Name 03/01/22 1624          Safety Issues, Functional Mobility    Impairments Affecting Function (Mobility) balance; endurance/activity tolerance; strength  -           User Key  (r) = Recorded By, (t) = Taken By, (c) = Cosigned By    Initials Name Provider Type     Yvonne Navarro Physical Therapist               Mobility     Row Name 03/01/22 1624          Bed Mobility    Bed Mobility bed mobility (all) activities  -     All Activities, [place] (Bed Mobility) not tested  -     Comment (Bed Mobility) BSC to chair  -     Row Name 03/01/22 1624          Sit-Stand Transfer    Sit-Stand [place] (Transfers) supervision  -     Assistive Device (Sit-Stand Transfers) cane, straight  -     Row Name 03/01/22 1624          Gait/Stairs (Locomotion)    [place] Level (Gait) standby assist; contact guard; verbal cues; nonverbal cues (demo/gesture)  -     Assistive Device (Gait) cane, straight  -     Distance in Feet (Gait) 150ft  -     Deviations/Abnormal Patterns (Gait) gait speed decreased; stride length decreased  -     Bilateral Gait Deviations forward flexed posture  -     [place] Level (Stairs) not tested  -     Comment (Gait/Stairs) Tolerated gait well, improved steadiness with ambulation using cane, pt reports he feels much better with his cane  -           User Key  (r) = Recorded By, (t) = Taken By, (c) = Cosigned By    Initials Name Provider Type     Yvonne Navarro Physical Therapist               Obj/Interventions     Row Name 03/01/22 1625          Motor Skills    Therapeutic Exercise --  10 reps B AP/LAQ/seated marches  -           User Key  (r) = Recorded By, (t) = Taken By, (c) = Cosigned By    Initials Name Provider Type      Yvonne Navarro Physical Therapist               Goals/Plan     Row Name 03/01/22 1629          Transfer Goal 1 (PT)    Activity/Assistive Device (Transfer Goal 1, PT) sit-to-stand/stand-to-sit; bed-to-chair/chair-to-bed; cane, straight  -     [place] Level/Cues Needed (Transfer Goal 1, PT) supervision required  -     Time Frame (Transfer Goal 1, PT) 1 week  -     Progress/Outcome (Transfer Goal 1, PT) goal met  -     Row Name 03/01/22 1629          Gait Training Goal 1 (PT)    Activity/Assistive Device (Gait Training Goal 1, PT) gait (walking locomotion); cane, straight  -     [place] Level (Gait Training Goal 1, PT) supervision required  -     Distance (Gait Training Goal 1, PT) 80'  -     Time Frame (Gait Training Goal 1, PT) 1 week  -     Progress/Outcome (Gait Training Goal 1, PT) continuing progress toward goal; goal ongoing  -           User Key  (r) = Recorded By, (t) = Taken By, (c) = Cosigned By    Initials Name Provider Type     Yvonne Navarro Physical Therapist               Clinical Impression     Row Name 03/01/22 1627          Pain Scale: Numbers Pre/Post-Treatment    Pretreatment Pain Rating 0/10 - no pain  -     Posttreatment Pain Rating 0/10 - no pain  -     Row Name 03/01/22 1625          Plan of Care Review    Plan of Care Reviewed With patient; spouse  -     Progress improving  -     Outcome Summary Pt agreeable to PT this PM and was on C upon PT arrival. Pt completed STS from [facility] with [facility] and ambulated 150ft in hallway with cane. Pt demonstrated overall improved steadiness with cane and reported feeling safer than the last time he walked with a cane. Spouse inquiring about ambulating with cane in hallway and PT encouraged to still use walker for safety, but if wanting to trial using cane again to walk with nursing - pt and spouse verbalized understanding and RN aware. Pt will f/u with pt if not D/C by end of week to ambulate with cane 1 additional time if  needed, but pt is safe to D/C home with HHPT or OPPT when medically appropriate.  -     Row Name 03/01/22 1625          Positioning and Restraints    Pre-Treatment Position bedside commode  -     Post Treatment Position chair  -     In Chair call light within reach; sitting; encouraged to call for assist; with family/caregiver; notified nsg  -           User Key  (r) = Recorded By, (t) = Taken By, (c) = Cosigned By    Initials Name Provider Type    Yvonne Baker Physical Therapist               Outcome Measures     Row Name 03/01/22 1630          How much help from another person do you currently need...    Turning from your back to your side while in flat bed without using bedrails? 4  -BH     Moving from lying on back to sitting on the side of a flat bed without bedrails? 4  -BH     Moving to and from a bed to a chair (including a wheelchair)? 3  -BH     Standing up from a chair using your arms (e.g., wheelchair, bedside chair)? 3  -BH     Climbing 3-5 steps with a railing? 3  -BH     To walk in hospital room? 3  -     AM-PAC 6 Clicks Score (PT) 20  -     Row Name 03/01/22 1630          Functional Assessment    Outcome Measure Options AM-PAC 6 Clicks Basic Mobility (PT)  -           User Key  (r) = Recorded By, (t) = Taken By, (c) = Cosigned By    Initials Name Provider Type    Yvonne Baker Physical Therapist                             Physical Therapy Education                 Title: PT OT SLP Therapies (Done)     Topic: Physical Therapy (Done)     Point: Mobility training (Done)     Learning Progress Summary           Patient Acceptance, E,TB,D, VU,NR by  at 3/1/2022 1630    Acceptance, E,TB, VU,NR by CB at 2/25/2022 1550    Acceptance, E,TB, VU,NR by MS at 2/24/2022 1050    Acceptance, E,TB,D, VU,DU by SC at 2/20/2022 1509    Acceptance, E,TB,D, VU,DU by LB at 2/19/2022 1252    Acceptance, E, NR,VU by CF at 2/18/2022 1548    Acceptance, E, VU,NR by CF at 2/17/2022 1418    Acceptance,  E, NR,VU by CF at 2/16/2022 1549   Significant Other Acceptance, E,TB,D, VU,DU by SC at 2/20/2022 1509                   Point: Home exercise program (Done)     Learning Progress Summary           Patient Acceptance, E,TB,D, VU,NR by  at 3/1/2022 1630    Acceptance, E,TB, VU,NR by CB at 2/25/2022 1550    Acceptance, E,TB, VU,NR by MS at 2/24/2022 1050    Acceptance, E,TB,D, VU,DU by SC at 2/20/2022 1509    Acceptance, E,TB,D, VU,DU by LB at 2/19/2022 1252    Acceptance, E, NR,VU by CF at 2/18/2022 1548    Acceptance, E, VU,NR by CF at 2/17/2022 1418    Acceptance, E, NR,VU by CF at 2/16/2022 1549   Significant Other Acceptance, E,TB,D, VU,DU by SC at 2/20/2022 1509                   Point: Body mechanics (Done)     Learning Progress Summary           Patient Acceptance, E,TB,D, VU,NR by  at 3/1/2022 1630    Acceptance, E,TB, VU,NR by CB at 2/25/2022 1550    Acceptance, E,TB, VU,NR by MS at 2/24/2022 1050    Acceptance, E,TB,D, VU,DU by SC at 2/20/2022 1509    Acceptance, E,TB,D, VU,DU by LB at 2/19/2022 1252    Acceptance, E, NR,VU by CF at 2/18/2022 1548    Acceptance, E, VU,NR by CF at 2/17/2022 1418    Acceptance, E, NR,VU by CF at 2/16/2022 1549   Significant Other Acceptance, E,TB,D, VU,DU by SC at 2/20/2022 1509                   Point: Precautions (Done)     Learning Progress Summary           Patient Acceptance, E,TB,D, VU,NR by  at 3/1/2022 1630    Acceptance, E,TB, VU,NR by CB at 2/25/2022 1550    Acceptance, E,TB, VU,NR by MS at 2/24/2022 1050    Acceptance, E,TB,D, VU,DU by SC at 2/20/2022 1509    Acceptance, E,TB,D, VU,DU by LB at 2/19/2022 1252    Acceptance, E, NR,VU by CF at 2/18/2022 1548    Acceptance, E, VU,NR by CF at 2/17/2022 1418    Acceptance, E, NR,VU by CF at 2/16/2022 1549   Significant Other Acceptance, E,TB,D, VU,DU by SC at 2/20/2022 1509                               User Key     Initials Effective Dates Name Provider Type Discipline    SC 06/16/21 -  Tatiana Rankin, PT  Physical Therapist PT    LB 08/09/20 -  Harika Rizzo, PT Physical Therapist PT    MS 06/16/21 -  Goldie Abrams, PT Physical Therapist PT    CF 06/16/21 -  Rhoda Antunez, PT Physical Therapist PT    CB 10/22/21 -  Brittnee Ocampo, PT Physical Therapist PT     05/10/21 -  Yvonne Navarro Physical Therapist PT              PT Recommendation and Plan     Plan of Care Reviewed With: patient, spouse  Progress: improving  Outcome Summary: Pt agreeable to PT this PM and was on BSC upon PT arrival. [place] [facility] [place] ambulated 150ft in hallway with cane. Pt demonstrated overall improved steadiness with cane and reported feeling safer than the last time he walked with a cane. Spouse inquiring about ambulating with cane in hallway and PT encouraged to still use walker for safety, but if wanting to trial using cane again to walk with nursing - pt and spouse verbalized understanding and RN aware. Pt will f/u with pt if not D/C by end of week to ambulate with cane 1 additional time if needed, but pt is safe to D/C home with HHPT or OPPT when medically appropriate.     Time Calculation:    PT Charges     Row Name 03/01/22 1631             Time Calculation    Start Time 1505  -      Stop Time 1520  -      Time Calculation (min) 15 min  -      PT Received On 03/01/22  -      PT - Next Appointment 03/03/22  -      PT Goal Re-Cert Due Date 03/08/22  -              Time Calculation- PT    Total Timed Code Minutes- PT 15 minute(s)  -              Timed Charges    73573 - Gait Training Minutes  15  -              Total Minutes    Timed Charges Total Minutes 15  -       Total Minutes 15  -            User Key  (r) = Recorded By, (t) = Taken By, (c) = Cosigned By    Initials Name Provider Type     Yvonne Navarro Physical Therapist              Therapy Charges for Today     Code Description Service Date Service Provider Modifiers Qty    [id number] HC GAIT TRAINING EA 15 MIN 3/1/2022 Ramon  Yvonne GP 1          PT G-Codes  Outcome Measure Options: AM-PAC 6 Clicks Basic Mobility (PT)  AM-PAC 6 Clicks Score (PT): 20    YVONNE MAKI  3/1/2022

## 2022-03-01 NOTE — PROGRESS NOTES
Gastroenterology   Inpatient Progress Note    Reason for Follow Up:  C. difficile diarrhea    Subjective  Interval History:   Colestipol was increased to 1 tablet twice daily yesterday.  This helped with frequency of bowel movements but is still having incontinence.  Bowel movements are soft.  He does not have the urge or sensation prior to having a bowel movement.    He is able to swallow colestipol with the assistance of applesauce.    He continues on antibiotics for C. difficile.    Current Facility-Administered Medications:   •  acetaminophen (TYLENOL) tablet 650 mg, 650 mg, Oral, Q4H PRN **OR** acetaminophen (TYLENOL) 160 MG/5ML solution 650 mg, 650 mg, Oral, Q4H PRN **OR** acetaminophen (TYLENOL) suppository 650 mg, 650 mg, Rectal, Q4H PRN, Carmen Welch APRN  •  aspirin EC tablet 81 mg, 81 mg, Oral, Daily, Val Montgomery APRN, 81 mg at 02/28/22 0858  •  atorvastatin (LIPITOR) tablet 40 mg, 40 mg, Oral, Daily, Val Montgomery APRN, 40 mg at 02/28/22 0858  •  calcium carbonate (TUMS) chewable tablet 500 mg (200 mg elemental), 2 tablet, Oral, BID PRN, Carmen Welch APRN  •  colestipol (COLESTID) tablet 1 g, 1 g, Oral, BID, Jenn Ronquillo APRN, 1 g at 02/28/22 1541  •  dextrose (D50W) (25 g/50 mL) IV injection 25 g, 25 g, Intravenous, Q15 Min PRN, Carmen Welch APRN  •  dextrose (GLUTOSE) oral gel 15 g, 15 g, Oral, Q15 Min PRN, Carmen Welch APRN  •  digoxin (LANOXIN) tablet 125 mcg, 125 mcg, Oral, Daily, Val Montgomery APRN, 125 mcg at 02/28/22 0858  •  diphenhydrAMINE (BENADRYL) capsule 25 mg, 25 mg, Oral, BID PRN, Val Montgomery APRN  •  famotidine (PEPCID) tablet 20 mg, 20 mg, Oral, BID AC, Val Montgomery APRN, 20 mg at 03/01/22 0545  •  ferrous sulfate tablet 325 mg, 325 mg, Oral, Every Other Day, Val Montgomery, LIZA, 325 mg at 02/27/22 0838  •  furosemide (LASIX) tablet 40 mg, 40 mg, Oral, Daily, Silvia Law MD, 40 mg at 02/28/22 0859  •  glucagon (human  recombinant) (GLUCAGEN DIAGNOSTIC) injection 1 mg, 1 mg, Subcutaneous, PRN, Carmen Welch APRN  •  insulin lispro (ADMELOG) injection 0-7 Units, 0-7 Units, Subcutaneous, TID AC, Carmen Welch APRN, 2 Units at 02/28/22 1709  •  lactobacillus acidophilus (RISAQUAD) capsule 1 capsule, 1 capsule, Oral, Daily, Kunal Copeland, DO, 1 capsule at 02/28/22 0859  •  metoprolol succinate XL (TOPROL-XL) 24 hr tablet 12.5 mg, 12.5 mg, Oral, Daily, Mady Montes MD, 12.5 mg at 02/28/22 0859  •  ondansetron (ZOFRAN) tablet 4 mg, 4 mg, Oral, Q6H PRN **OR** ondansetron (ZOFRAN) injection 4 mg, 4 mg, Intravenous, Q6H PRN, Carmen Welch APRN, 4 mg at 02/17/22 0654  •  Pharmacy Consult, , Does not apply, Continuous PRN, Grant Albarran PA  •  Pharmacy to dose warfarin, , Does not apply, Continuous PRN, Val Montgomery APRN  •  polycarbophil tablet 1,250 mg, 1,250 mg, Oral, BID With Meals, Sri White PA, 1,250 mg at 02/28/22 1710  •  saccharomyces boulardii (FLORASTOR) capsule 250 mg, 250 mg, Oral, BID, Gabriela Omalley APRN, 250 mg at 02/28/22 2132  •  sodium bicarbonate tablet 1,300 mg, 1,300 mg, Oral, TID, Ry Martin MD, 1,300 mg at 02/28/22 2132  •  sodium chloride 0.9 % flush 10 mL, 10 mL, Intravenous, PRN, Isaias Osorio II, MD  •  sodium chloride 0.9 % flush 10 mL, 10 mL, Intravenous, Q12H, Carmen Welch APRN, 10 mL at 02/28/22 2132  •  sodium chloride 0.9 % flush 10 mL, 10 mL, Intravenous, PRN, Carmen Welch APRN  •  vancomycin (VANCOCIN) capsule 125 mg, 125 mg, Oral, Q6H, Kunal Copeland DO, 125 mg at 03/01/22 0545  •  [START ON 3/5/2022] vancomycin (VANCOCIN) capsule 125 mg, 125 mg, Oral, Q12H, Kunal Copeland DO  •  [START ON 3/12/2022] vancomycin (VANCOCIN) capsule 125 mg, 125 mg, Oral, Q24H, Kunal Copeland DO  •  [START ON 3/19/2022] vancomycin (VANCOCIN) capsule 125 mg, 125 mg, Oral, Every Other Day, Kunal Copeland,  DO  •  warfarin (COUMADIN) tablet 5 mg, 5 mg, Oral, Once per day on Sun Tue Thu Sat, Renato Quinonez MD, 5 mg at 02/27/22 1738  •  warfarin (COUMADIN) tablet 7.5 mg, 7.5 mg, Oral, Once per day on Mon Wed Fri, Val MontgomeryLIZA, 7.5 mg at 02/28/22 1710  Review of Systems:               Gastroenterology positive for fecal incontinence    Objective     Vital Signs  Temp:  [96.7 °F (35.9 °C)-98.2 °F (36.8 °C)] 97.2 °F (36.2 °C)  Heart Rate:  [58-73] 73  Resp:  [16-18] 18  BP: (116-141)/(54-72) 123/54  Body mass index is 19.31 kg/m².                  Physical Exam:              General: patient awake, alert and cooperative              Eyes: no scleral icterus              Skin: warm and dry, not jaundiced              Abdomen: soft, nontender, nondistended; normal bowel sounds              Psychiatric: Appropriate affect and behavior                Results Review:                I reviewed the patient's new clinical results.    Results from last 7 days   Lab Units 03/01/22 0537 02/28/22  0531 02/27/22  0533   WBC 10*3/mm3 6.08 6.45 5.89   HEMOGLOBIN g/dL 11.7* 11.8* 12.2*   HEMATOCRIT % 38.1 37.4* 38.8   PLATELETS 10*3/mm3 162 165 178     Results from last 7 days   Lab Units 03/01/22 0537 02/28/22  0531 02/27/22  0533   SODIUM mmol/L 139 142 136   POTASSIUM mmol/L 3.9 3.9 3.8   CHLORIDE mmol/L 100 104 99   CO2 mmol/L 31.0* 28.8 31.0*   BUN mg/dL 28* 26* 19   CREATININE mg/dL 1.23 1.33* 1.17   CALCIUM mg/dL 8.3* 8.4* 8.2*   GLUCOSE mg/dL 144* 136* 129*     Results from last 7 days   Lab Units 03/01/22 0537 02/28/22  0531 02/27/22  0533   INR  2.89* 3.11* 3.48*     Lab Results   Lab Value Date/Time    LIPASE 18 02/14/2022 2332       Radiology:  No orders to display       Assessment/Plan     Patient Active Problem List   Diagnosis   • Atrial fibrillation (HCC)   • Hypertension   • Atopic rhinitis   • Gastroesophageal reflux disease   • Hyperlipidemia   • Type 2 diabetes mellitus (HCC)   • Low testosterone   •  Medicare annual wellness visit, subsequent   • Hx of aortic valve replacement, 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 difficile colitis       Assessment:  1. C. difficile colitis  2. Diarrhea and fecal incontinence secondary to #1  3. 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 History of gastritis/duodenitis October 2021  5. Atrial fibrillation  6. History of mechanical heart valve on Coumadin        Plan:  · C. difficile colitis, complete course of vancomycin as prescribed  · Fecal incontinence, increased colestipol to twice daily yesterday, continued fecal incontinence but less frequent bowel movements, will increase colestipol to 1 tablet 3 times daily, if constipation occurs, hold next dose of colestipol and resume once constipation improves.  Would like to help bulk up stools.  We'll also continue fiber tablet.  · Continue famotidine  · Diet as tolerated    I discussed the patients findings and my recommendations with patient and nursing staff.           Jenn DE JESUS  [facility] [place] [address]  Office: [phone]

## 2022-03-01 NOTE — NURSING NOTE
"Pt was explained to him by this RN that pt still needs to get up to a bedside commode to have a bowel movement at night time. He was getting up to the BSC to have a bowel movement during the day, so he needs to do the same at night if he is continent. He believed that since it was \"night time\" he was able to just go in his brief even though he is continent of stool. He verbalized that he agrees to call out when he needs to go the bathroom.  "

## 2022-03-01 NOTE — PROGRESS NOTES
Adult Nutrition  Assessment/PES    Patient Name:  Francisco Sequeira  YOB: 1935  MRN: [id number]  Admit Date:  2/14/2022    Assessment Date:  3/1/2022    Comments:  F/u MST 3. Pt on regular; consistent carbohydrate diet with Novasource Renal TID. 45% average intake x 5 meals documented. Spoke with pt's wife who reported pt is eating pretty good since she has been able to order meals for him that better fit his taste preferences. Pt's wife reported he is drinking Novasource Renal. Pt's wife said pt is Zoroastrianism and practices lent. Continue to follow.     Reason for Assessment     Row Name 03/01/22 0827          Reason for Assessment    Reason For Assessment follow-up protocol     Identified At Risk by Screening Criteria MST SCORE 2+                Nutrition/Diet History     Row Name 03/01/22 0952          Nutrition/Diet History    Typical Food/Fluid Intake Pt's wife reported pt is eating better since she has been able to order for him. Pt is drinking Novasource Renal.     Meal/Snack Patterns Pt is Zoroastrianism and practices lent.                Anthropometrics     Row Name 03/01/22 0549          Anthropometrics    Weight 64.6 kg (142 lb 6.4 oz)                Labs/Tests/Procedures/Meds     Row Name 03/01/22 0828          Labs/Procedures/Meds    Lab Results Reviewed reviewed     Lab Results Comments glucose (high), BUN (high), albumin (low)            Diagnostic Tests/Procedures    Diagnostic Test/Procedure Reviewed reviewed            Medications    Pertinent Medications Reviewed reviewed     Pertinent Medications Comments lipitor, colestid, lanoxin, pepcid, ferrous sulfate, lasix, insulin, lactobacillus acidophilus, metoprolol succinate, polycarbophil, florastor, sodium bicarbonate, sodium chloride, vancomycin, warfarin                    Nutrition Prescription Ordered     Row Name 03/01/22 0841          Nutrition Prescription PO    Current PO Diet Regular     Supplement Novasource Renal (Nepro)      Supplement Frequency 3 times a day     Common Modifiers Consistent Carbohydrate                Evaluation of Received Nutrient/Fluid Intake     Row Name 03/01/22 0837          PO Evaluation    % PO Intake %                     Problem/Interventions:           Intervention Goal     Row Name 03/01/22 0838          Intervention Goal    General Meet nutritional needs for age/condition; Maintain nutrition; Improved nutrition related lab(s)     PO Tolerate PO; Meet estimated needs; Increase intake     Weight Appropriate weight gain                Nutrition Intervention     Row Name 03/01/22 0839          Nutrition Intervention    RD/Tech Action Care plan reviewd; Follow Tx progress; Encourage intake                  Education/Evaluation     Row Name 03/01/22 0839          Education    Education Will Instruct as appropriate            Monitor/Evaluation    Monitor Per protocol; I&O; PO intake; Supplement intake; Pertinent labs; Weight; Skin status; Symptoms                 Electronically signed by:  Nanci Valencia RD  03/01/22 09:54 EST

## 2022-03-02 ENCOUNTER — HOME HEALTH ADMISSION (OUTPATIENT)
Dept: HOME HEALTH SERVICES | Facility: HOME HEALTHCARE | Age: 87
End: 2022-03-02

## 2022-03-02 ENCOUNTER — READMISSION MANAGEMENT (OUTPATIENT)
Dept: CALL CENTER | Facility: HOSPITAL | Age: 87
End: 2022-03-02

## 2022-03-02 VITALS
HEART RATE: 79 BPM | SYSTOLIC BLOOD PRESSURE: 126 MMHG | OXYGEN SATURATION: 94 % | RESPIRATION RATE: 16 BRPM | DIASTOLIC BLOOD PRESSURE: 66 MMHG | TEMPERATURE: 97.5 F | WEIGHT: 138.3 LBS | HEIGHT: 72 IN | BODY MASS INDEX: 18.73 KG/M2

## 2022-03-02 LAB
ALBUMIN SERPL-MCNC: 2.6 G/DL (ref 3.5–5.2)
ANION GAP SERPL CALCULATED.3IONS-SCNC: 8.1 MMOL/L (ref 5–15)
APTT PPP: 47.4 SECONDS (ref 22.7–35.4)
BASOPHILS # BLD AUTO: 0.04 10*3/MM3 (ref 0–0.2)
BASOPHILS NFR BLD AUTO: 0.7 % (ref 0–1.5)
BUN SERPL-MCNC: 31 MG/DL (ref 8–23)
BUN/CREAT SERPL: 25.6 (ref 7–25)
CALCIUM SPEC-SCNC: 8.8 MG/DL (ref 8.6–10.5)
CHLORIDE SERPL-SCNC: 105 MMOL/L (ref 98–107)
CO2 SERPL-SCNC: 28.9 MMOL/L (ref 22–29)
CREAT SERPL-MCNC: 1.21 MG/DL (ref 0.76–1.27)
DEPRECATED RDW RBC AUTO: 46.2 FL (ref 37–54)
EGFRCR SERPLBLD CKD-EPI 2021: 59 ML/MIN/1.73
EOSINOPHIL # BLD AUTO: 0.2 10*3/MM3 (ref 0–0.4)
EOSINOPHIL NFR BLD AUTO: 3.6 % (ref 0.3–6.2)
ERYTHROCYTE [DISTWIDTH] IN BLOOD BY AUTOMATED COUNT: 15.7 % (ref 12.3–15.4)
GLUCOSE BLDC GLUCOMTR-MCNC: 159 MG/DL (ref 70–130)
GLUCOSE BLDC GLUCOMTR-MCNC: 194 MG/DL (ref 70–130)
GLUCOSE SERPL-MCNC: 167 MG/DL (ref 65–99)
HCT VFR BLD AUTO: 38.2 % (ref 37.5–51)
HGB BLD-MCNC: 12 G/DL (ref 13–17.7)
IMM GRANULOCYTES # BLD AUTO: 0 10*3/MM3 (ref 0–0.05)
IMM GRANULOCYTES NFR BLD AUTO: 0 % (ref 0–0.5)
INR PPP: 2.8 (ref 0.9–1.1)
LYMPHOCYTES # BLD AUTO: 0.99 10*3/MM3 (ref 0.7–3.1)
LYMPHOCYTES NFR BLD AUTO: 17.6 % (ref 19.6–45.3)
MAGNESIUM SERPL-MCNC: 2.4 MG/DL (ref 1.6–2.4)
MCH RBC QN AUTO: 25.9 PG (ref 26.6–33)
MCHC RBC AUTO-ENTMCNC: 31.4 G/DL (ref 31.5–35.7)
MCV RBC AUTO: 82.3 FL (ref 79–97)
MONOCYTES # BLD AUTO: 0.44 10*3/MM3 (ref 0.1–0.9)
MONOCYTES NFR BLD AUTO: 7.8 % (ref 5–12)
NEUTROPHILS NFR BLD AUTO: 3.94 10*3/MM3 (ref 1.7–7)
NEUTROPHILS NFR BLD AUTO: 70.3 % (ref 42.7–76)
NRBC BLD AUTO-RTO: 0 /100 WBC (ref 0–0.2)
PHOSPHATE SERPL-MCNC: 2.8 MG/DL (ref 2.5–4.5)
PLATELET # BLD AUTO: 155 10*3/MM3 (ref 140–450)
PMV BLD AUTO: 11.8 FL (ref 6–12)
POTASSIUM SERPL-SCNC: 4.1 MMOL/L (ref 3.5–5.2)
PROTHROMBIN TIME: 29.7 SECONDS (ref 11.7–14.2)
QT INTERVAL: 401 MS
RBC # BLD AUTO: 4.64 10*6/MM3 (ref 4.14–5.8)
SODIUM SERPL-SCNC: 142 MMOL/L (ref 136–145)
URATE SERPL-MCNC: 6.9 MG/DL (ref 3.4–7)
WBC NRBC COR # BLD: 5.61 10*3/MM3 (ref 3.4–10.8)

## 2022-03-02 PROCEDURE — 84550 ASSAY OF BLOOD/URIC ACID: CPT | Performed by: INTERNAL MEDICINE

## 2022-03-02 PROCEDURE — 82962 GLUCOSE BLOOD TEST: CPT

## 2022-03-02 PROCEDURE — 63710000001 INSULIN LISPRO (HUMAN) PER 5 UNITS: Performed by: NURSE PRACTITIONER

## 2022-03-02 PROCEDURE — 93010 ELECTROCARDIOGRAM REPORT: CPT | Performed by: INTERNAL MEDICINE

## 2022-03-02 PROCEDURE — 85610 PROTHROMBIN TIME: CPT | Performed by: NURSE PRACTITIONER

## 2022-03-02 PROCEDURE — 93005 ELECTROCARDIOGRAM TRACING: CPT | Performed by: HOSPITALIST

## 2022-03-02 PROCEDURE — 85025 COMPLETE CBC W/AUTO DIFF WBC: CPT | Performed by: INTERNAL MEDICINE

## 2022-03-02 PROCEDURE — 85730 THROMBOPLASTIN TIME PARTIAL: CPT | Performed by: NURSE PRACTITIONER

## 2022-03-02 PROCEDURE — 83735 ASSAY OF MAGNESIUM: CPT | Performed by: INTERNAL MEDICINE

## 2022-03-02 PROCEDURE — 99232 SBSQ HOSP IP/OBS MODERATE 35: CPT | Performed by: INTERNAL MEDICINE

## 2022-03-02 PROCEDURE — 80069 RENAL FUNCTION PANEL: CPT | Performed by: INTERNAL MEDICINE

## 2022-03-02 RX ORDER — L.ACID,PARA/B.BIFIDUM/S.THERM 8B CELL
1 CAPSULE ORAL DAILY
Qty: 30 CAPSULE | Refills: 0 | Status: ON HOLD | OUTPATIENT
Start: 2022-03-03 | End: 2022-04-04

## 2022-03-02 RX ORDER — SACCHAROMYCES BOULARDII 250 MG
250 CAPSULE ORAL 2 TIMES DAILY
Qty: 60 CAPSULE | Refills: 0 | Status: ON HOLD | OUTPATIENT
Start: 2022-03-02 | End: 2022-04-04

## 2022-03-02 RX ORDER — SODIUM BICARBONATE 650 MG/1
1300 TABLET ORAL 3 TIMES DAILY
Qty: 180 TABLET | Refills: 0 | Status: SHIPPED | OUTPATIENT
Start: 2022-03-02 | End: 2022-04-04 | Stop reason: HOSPADM

## 2022-03-02 RX ORDER — VANCOMYCIN HYDROCHLORIDE 125 MG/1
125 CAPSULE ORAL EVERY 12 HOURS
Qty: 14 CAPSULE | Refills: 0 | Status: SHIPPED | OUTPATIENT
Start: 2022-03-05 | End: 2022-03-12

## 2022-03-02 RX ORDER — VANCOMYCIN HYDROCHLORIDE 125 MG/1
125 CAPSULE ORAL EVERY 6 HOURS SCHEDULED
Qty: 13 CAPSULE | Refills: 0 | Status: SHIPPED | OUTPATIENT
Start: 2022-03-02 | End: 2022-03-06

## 2022-03-02 RX ORDER — METOPROLOL SUCCINATE 25 MG/1
12.5 TABLET, EXTENDED RELEASE ORAL DAILY
Qty: 15 TABLET | Refills: 0 | Status: SHIPPED | OUTPATIENT
Start: 2022-03-03 | End: 2022-03-16 | Stop reason: SDUPTHER

## 2022-03-02 RX ORDER — CALCIUM POLYCARBOPHIL 625 MG
1250 TABLET ORAL 2 TIMES DAILY WITH MEALS
Qty: 120 TABLET | Refills: 0 | Status: SHIPPED | OUTPATIENT
Start: 2022-03-02 | End: 2022-04-04 | Stop reason: HOSPADM

## 2022-03-02 RX ORDER — VANCOMYCIN HYDROCHLORIDE 125 MG/1
125 CAPSULE ORAL EVERY OTHER DAY
Qty: 7 CAPSULE | Refills: 0 | Status: ON HOLD | OUTPATIENT
Start: 2022-03-19 | End: 2022-04-04

## 2022-03-02 RX ORDER — VANCOMYCIN HYDROCHLORIDE 125 MG/1
125 CAPSULE ORAL EVERY 24 HOURS
Qty: 7 CAPSULE | Refills: 0 | Status: SHIPPED | OUTPATIENT
Start: 2022-03-12 | End: 2022-03-19

## 2022-03-02 RX ORDER — FAMOTIDINE 20 MG/1
20 TABLET, FILM COATED ORAL
Qty: 60 TABLET | Refills: 0 | Status: ON HOLD | OUTPATIENT
Start: 2022-03-02 | End: 2022-04-04

## 2022-03-02 RX ADMIN — SODIUM BICARBONATE 1300 MG: 650 TABLET ORAL at 09:08

## 2022-03-02 RX ADMIN — VANCOMYCIN HYDROCHLORIDE 125 MG: 125 CAPSULE ORAL at 12:38

## 2022-03-02 RX ADMIN — FUROSEMIDE 40 MG: 40 TABLET ORAL at 09:08

## 2022-03-02 RX ADMIN — VANCOMYCIN HYDROCHLORIDE 125 MG: 125 CAPSULE ORAL at 00:22

## 2022-03-02 RX ADMIN — DIGOXIN 125 MCG: 125 TABLET ORAL at 09:08

## 2022-03-02 RX ADMIN — METOPROLOL SUCCINATE 12.5 MG: 25 TABLET, EXTENDED RELEASE ORAL at 09:07

## 2022-03-02 RX ADMIN — ASPIRIN 81 MG: 81 TABLET, COATED ORAL at 09:07

## 2022-03-02 RX ADMIN — FAMOTIDINE 20 MG: 20 TABLET, FILM COATED ORAL at 06:30

## 2022-03-02 RX ADMIN — INSULIN LISPRO 2 UNITS: 100 INJECTION, SOLUTION INTRAVENOUS; SUBCUTANEOUS at 12:37

## 2022-03-02 RX ADMIN — SODIUM CHLORIDE, PRESERVATIVE FREE 10 ML: 5 INJECTION INTRAVENOUS at 09:08

## 2022-03-02 RX ADMIN — CALCIUM POLYCARBOPHIL 1250 MG: 625 TABLET, FILM COATED ORAL at 09:08

## 2022-03-02 RX ADMIN — Medication 1 CAPSULE: at 09:08

## 2022-03-02 RX ADMIN — Medication 250 MG: at 09:08

## 2022-03-02 RX ADMIN — INSULIN LISPRO 2 UNITS: 100 INJECTION, SOLUTION INTRAVENOUS; SUBCUTANEOUS at 09:06

## 2022-03-02 RX ADMIN — MONTELUKAST SODIUM 1 G: 4 TABLET, CHEWABLE ORAL at 09:06

## 2022-03-02 RX ADMIN — VANCOMYCIN HYDROCHLORIDE 125 MG: 125 CAPSULE ORAL at 06:30

## 2022-03-02 RX ADMIN — MONTELUKAST SODIUM 1 G: 4 TABLET, CHEWABLE ORAL at 12:37

## 2022-03-02 RX ADMIN — ATORVASTATIN CALCIUM 40 MG: 20 TABLET, FILM COATED ORAL at 09:07

## 2022-03-02 NOTE — PLAN OF CARE
Goal Outcome Evaluation:  Plan of Care Reviewed With: patient, spouse        Progress: improving  Outcome Summary: plan to discharge home this afternoon with spouse and home health

## 2022-03-02 NOTE — DISCHARGE SUMMARY
PHYSICIAN DISCHARGE SUMMARY                                                                        [facility]    Patient Identification:  Name: Francisco Sequeira  Age: 86 y.o.  Sex: male  :  1935  MRN: [id number]  Primary Care Physician: Rubin Hinkle APRN    Admit date: 2022  Discharge date and time:3/2/2022  Discharged Condition: good    Discharge Diagnoses:  Active Hospital Problems    Diagnosis  POA   • **C. 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          PMHX:   Past Medical History:   Diagnosis Date   • Allergic rhinitis    • Aortic valve insufficiency    • Ascending aortic aneurysm (HCC)    • Atrial fibrillation (HCC)    • Bacteremia    • Calcific aortic stenosis of bicuspid valve    • Cardiac arrest (HCC)    • Cardiomyopathy (HCC)    • CKD (chronic kidney disease) stage 3, GFR 30-59 ml/min (HCC)    • Contact dermatitis due to poison ivy    • Elbow fracture    • Esophageal reflux    • GERD (gastroesophageal reflux disease)    • Head injury    • History of transfusion    • Hyperglycemia    • Hyperlipidemia    • Hypertension    • Kidney carcinoma (HCC)    • Nonischemic cardiomyopathy (HCC)    • Renal oncocytoma    • Seasonal allergic reaction    • Sinusitis    • Syncope    • Type 2 diabetes mellitus (HCC)     uncontrolled   • Visual field  "defect      PSHX:   Past Surgical History:   Procedure Laterality Date   • AORTIC VALVE REPAIR/REPLACEMENT     • ASCENDING AORTIC ANEURYSM REPAIR W/ MECHANICAL AORTIC VALVE REPLACEMENT     • COLONOSCOPY N/A 10/28/2021    Procedure: COLONOSCOPY to cecum:  cold snare polyps,;  Surgeon: Dinesh Meza MD;  Location: [facility] ENDOSCOPY;  Service: Gastroenterology;  Laterality: N/A;  pre:  Iron deficiency anemia  post:  polyps, diverticulosis,    • COLONOSCOPY N/A 11/9/2021    Procedure: COLONOSCOPY to cecum with APC cautery of AVM and clip placement x1;  Surgeon: Pavan Rodriguez MD;  Location: [facility] ENDOSCOPY;  Service: Gastroenterology;  Laterality: N/A;  PRE - gi bleed, anemia  POST - diverticulosis, poor prep, AVM right colon   • ENDOSCOPY N/A 10/28/2021    Procedure: ESOPHAGOGASTRODUODENOSCOPY with biopsies;  Surgeon: Dinesh Meza MD;  Location: [facility] ENDOSCOPY;  Service: Gastroenterology;  Laterality: N/A;  pre:  Iron deficiency anemia  post:  duodenitis and gastritis   • EYE SURGERY  12/09/2020    cataract surgery    • NEPHRECTOMY     • OTHER SURGICAL HISTORY      elbow surgery   • OTHER SURGICAL HISTORY      right arm surgery   • PROSTATE SURGERY     • THORACENTESIS Left     diagnostic       Hospital Course: Francisco Sequeira   is a 86 y.o. former smoker with a history of mechanical aortic valve on warfarin, PAF, CKD3 with prior nephrectomy, DM2, HTN, HLD, GERD and J LUIS that presents to [facility] complaining of diarrhea. The patient's wife provides most of the historian as the patient just had another \"blow out\" diarrhea spell at the time of this visit. Per her report, the patient started having issues with diarrhea dating back to 1/23/21. He apparently called his PCP office who initially told him to take imodium as needed. The diarrhea continued until that following Friday and the PCP office told him to go to the . He was seen there 1/28 where he was reportedly given " erythromycin and told to let it run its course. The diarrhea still did not improve and so they went to the ED here on 1/31. A stool sample was apparently taken at that time but reportedly formed and PCR sent off. He was discharged home with no medications.                      The patient's wife states the diarrhea has been persistent and unrelenting since this time. The patient reports as innumerable amounts of diarrhea daily. The stool has been liquid/watery and non-bloody. He denies any associated abdominal pain, cramping or fever, but has had some subjective chills as well as weakness. He has had poor oral intake with some intermittent nausea and vomiting (also non-bloody). He denies any recent chest pain, dyspnea, cough or dysuria, but reports a 30 pound weight loss since admission here in October. He was here at that time with J LUIS and underwent colonoscopy with 7 polyps removed and EGD with gastritis and duodenal erosion. He returned here in November with recurrent anemia and had a tagged RBC scan with bleeding in the RLQ, distal ileum versus proximal colon. He had another colonoscopy with AVM that was treated as well.  He eventually returned home with his wife at discharge on his warfarin for mechanical valve.              In the ED, WBC were 20.13, hemoglobin 17, lipase 18, creatinine 3.31 and LFTs normal. GI PCR was negative and C.diff positive. COVID19 was negative. He was started on fluids and oral vancomycin and has been admitted for further care at this time.           The patient was admitted to the hospital and seen by infectious disease, GI medicine, cardiology and nephrology.  Patient was placed on tapering course of vancomycin for C. difficile colitis.  After being in the hospital for several days he was doing better and diarrhea was controllable and his wife felt that she could manage it at home.  He is still pretty weak will have some home health services with PT.  He will follow-up with his  primary care in 1 week.    Consults:     Consults     Date and Time Order Name Status Description    2/23/2022  1:27 PM Inpatient Infectious Diseases Consult Completed     2/20/2022 11:56 AM Inpatient Gastroenterology Consult      2/17/2022 12:19 PM Inpatient Cardiology Consult Completed     2/15/2022 10:06 AM Inpatient Gastroenterology Consult Completed     2/15/2022 10:05 AM Inpatient Nephrology Consult      2/15/2022  1:06 AM [facility] (on-call MD unless specified) Details          Results from last 7 days   Lab Units 03/02/22  0435   WBC 10*3/mm3 5.61   HEMOGLOBIN g/dL 12.0*   HEMATOCRIT % 38.2   PLATELETS 10*3/mm3 155     Results from last 7 days   Lab Units 03/02/22  0435   SODIUM mmol/L 142   POTASSIUM mmol/L 4.1   CHLORIDE mmol/L 105   CO2 mmol/L 28.9   BUN mg/dL 31*   CREATININE mg/dL 1.21   GLUCOSE mg/dL 167*   CALCIUM mg/dL 8.8     Significant Diagnostic Studies:   WBC   Date Value Ref Range Status   03/02/2022 5.61 3.40 - 10.80 10*3/mm3 Final     Hemoglobin   Date Value Ref Range Status   03/02/2022 12.0 (L) 13.0 - 17.7 g/dL Final     Hematocrit   Date Value Ref Range Status   03/02/2022 38.2 37.5 - 51.0 % Final     Platelets   Date Value Ref Range Status   03/02/2022 155 140 - 450 10*3/mm3 Final     Sodium   Date Value Ref Range Status   03/02/2022 142 136 - 145 mmol/L Final     Potassium   Date Value Ref Range Status   03/02/2022 4.1 3.5 - 5.2 mmol/L Final     Chloride   Date Value Ref Range Status   03/02/2022 105 98 - 107 mmol/L Final     CO2   Date Value Ref Range Status   03/02/2022 28.9 22.0 - 29.0 mmol/L Final     BUN   Date Value Ref Range Status   03/02/2022 31 (H) 8 - 23 mg/dL Final     Creatinine   Date Value Ref Range Status   03/02/2022 1.21 0.76 - 1.27 mg/dL Final     Glucose   Date Value Ref Range Status   03/02/2022 167 (H) 65 - 99 mg/dL Final     Calcium   Date Value Ref Range Status   03/02/2022 8.8 8.6 - 10.5 mg/dL Final     Magnesium   Date Value Ref Range Status   03/02/2022 2.4 1.6 - 2.4  mg/dL Final     Phosphorus   Date Value Ref Range Status   03/02/2022 2.8 2.5 - 4.5 mg/dL Final     No results found for: AST, ALT, ALKPHOS  INR   Date Value Ref Range Status   03/02/2022 2.80 (H) 0.90 - 1.10 Final     No results found for: COLORU, CLARITYU, SPECGRAV, PHUR, PROTEINUR, GLUCOSEU, KETONESU, BLOODU, NITRITE, LEUKOCYTESUR, BILIRUBINUR, UROBILINOGEN, RBCUA, WBCUA, BACTERIA, UACOMMENT  No results found for: TROPONINT, TROPONINI, BNP  No components found for: HGBA1C;2  No components found for: TSH;2  Imaging Results (All)     None        Lab Results (last 7 days)     Procedure Component Value Units Date/Time    POC Glucose Once [[id number]]  (Abnormal) Collected: 03/02/22 1036    Specimen: Blood Updated: 03/02/22 1037     Glucose 194 mg/dL      Comment: Meter: [id number] : [id number] Leon Reyes Clausia NA       POC Glucose Once [[id number]]  (Abnormal) Collected: 03/02/22 0643    Specimen: Blood Updated: 03/02/22 0644     Glucose 159 mg/dL      Comment: Meter: [id number] : [id number] Tacho ALEJO       Renal Function Panel [[id number]]  (Abnormal) Collected: 03/02/22 0435    Specimen: Blood Updated: 03/02/22 0541     Glucose 167 mg/dL      BUN 31 mg/dL      Creatinine 1.21 mg/dL      Sodium 142 mmol/L      Potassium 4.1 mmol/L      Chloride 105 mmol/L      CO2 28.9 mmol/L      Calcium 8.8 mg/dL      Albumin 2.60 g/dL      Phosphorus 2.8 mg/dL      Anion Gap 8.1 mmol/L      BUN/Creatinine Ratio 25.6     eGFR 59.0 mL/min/1.73      Comment: National Kidney Foundation and American Society of Nephrology (ASN) Task Force recommended calculation based on the Chronic Kidney Disease Epidemiology Collaboration (CKD-EPI) equation refit without adjustment for race.       Narrative:      GFR Normal >60  Chronic Kidney Disease <60  Kidney Failure <15      Uric Acid [[id number]]  (Normal) Collected: 03/02/22 0435    Specimen: Blood Updated: 03/02/22 0540     Uric Acid 6.9 mg/dL     Magnesium [[id number]]  (Normal)  Collected: 03/02/22 0435    Specimen: Blood Updated: 03/02/22 0540     Magnesium 2.4 mg/dL     aPTT [[id number]]  (Abnormal) Collected: 03/02/22 0435    Specimen: Blood Updated: 03/02/22 0517     PTT 47.4 seconds     Protime-INR [[id number]]  (Abnormal) Collected: 03/02/22 0435    Specimen: Blood Updated: 03/02/22 0517     Protime 29.7 Seconds      INR 2.80    CBC & Differential [[id number]]  (Abnormal) Collected: 03/02/22 0435    Specimen: Blood Updated: 03/02/22 0507    Narrative:      The following orders were created for panel order CBC & Differential.  Procedure                               Abnormality         Status                     ---------                               -----------         ------                     CBC Auto Differential[[id number]]        Abnormal            Final result                 Please view results for these tests on the individual orders.    CBC Auto Differential [[id number]]  (Abnormal) Collected: 03/02/22 0435    Specimen: Blood Updated: 03/02/22 0507     WBC 5.61 10*3/mm3      RBC 4.64 10*6/mm3      Hemoglobin 12.0 g/dL      Hematocrit 38.2 %      MCV 82.3 fL      MCH 25.9 pg      MCHC 31.4 g/dL      RDW 15.7 %      RDW-SD 46.2 fl      MPV 11.8 fL      Platelets 155 10*3/mm3      Neutrophil % 70.3 %      Lymphocyte % 17.6 %      Monocyte % 7.8 %      Eosinophil % 3.6 %      Basophil % 0.7 %      Immature Grans % 0.0 %      Neutrophils, Absolute 3.94 10*3/mm3      Lymphocytes, Absolute 0.99 10*3/mm3      Monocytes, Absolute 0.44 10*3/mm3      Eosinophils, Absolute 0.20 10*3/mm3      Basophils, Absolute 0.04 10*3/mm3      Immature Grans, Absolute 0.00 10*3/mm3      nRBC 0.0 /100 WBC     POC Glucose Once [[id number]]  (Abnormal) Collected: 03/01/22 2155    Specimen: Blood Updated: 03/01/22 2156     Glucose 213 mg/dL      Comment: Meter: [id number] : [id number] Tacho Louannaliza ALEJO       POC Glucose Once [[id number]]  (Abnormal) Collected: 03/01/22 1605    Specimen: Blood Updated:  03/01/22 1607     Glucose 174 mg/dL      Comment: Meter: [id number] : [id number] Erasmo ALEJO       POC Glucose Once [[id number]]  (Abnormal) Collected: 03/01/22 1106    Specimen: Blood Updated: 03/01/22 1107     Glucose 319 mg/dL      Comment: Meter: [id number] : [id number] Goodman Maliha AELJO       Renal Function Panel [[id number]]  (Abnormal) Collected: 03/01/22 0537    Specimen: Blood Updated: 03/01/22 0744     Glucose 144 mg/dL      BUN 28 mg/dL      Creatinine 1.23 mg/dL      Sodium 139 mmol/L      Potassium 3.9 mmol/L      Chloride 100 mmol/L      CO2 31.0 mmol/L      Calcium 8.3 mg/dL      Albumin 2.60 g/dL      Phosphorus 2.6 mg/dL      Anion Gap 8.0 mmol/L      BUN/Creatinine Ratio 22.8     eGFR 57.9 mL/min/1.73      Comment: National Kidney Foundation and American Society of Nephrology (ASN) Task Force recommended calculation based on the Chronic Kidney Disease Epidemiology Collaboration (CKD-EPI) equation refit without adjustment for race.       Narrative:      GFR Normal >60  Chronic Kidney Disease <60  Kidney Failure <15      Uric Acid [[id number]]  (Abnormal) Collected: 03/01/22 0537    Specimen: Blood Updated: 03/01/22 0744     Uric Acid 7.2 mg/dL     Magnesium [[id number]]  (Normal) Collected: 03/01/22 0537    Specimen: Blood Updated: 03/01/22 0744     Magnesium 2.1 mg/dL     aPTT [[id number]]  (Abnormal) Collected: 03/01/22 0537    Specimen: Blood Updated: 03/01/22 0709     PTT 45.9 seconds     Protime-INR [[id number]]  (Abnormal) Collected: 03/01/22 0537    Specimen: Blood Updated: 03/01/22 0709     Protime 30.4 Seconds      INR 2.89    CBC & Differential [[id number]]  (Abnormal) Collected: 03/01/22 0537    Specimen: Blood Updated: 03/01/22 0655    Narrative:      The following orders were created for panel order CBC & Differential.  Procedure                               Abnormality         Status                     ---------                               -----------         ------                      CBC Auto Differential[[id number]]        Abnormal            Final result                 Please view results for these tests on the individual orders.    CBC Auto Differential [[id number]]  (Abnormal) Collected: 03/01/22 0537    Specimen: Blood Updated: 03/01/22 0655     WBC 6.08 10*3/mm3      RBC 4.61 10*6/mm3      Hemoglobin 11.7 g/dL      Hematocrit 38.1 %      MCV 82.6 fL      MCH 25.4 pg      MCHC 30.7 g/dL      RDW 15.6 %      RDW-SD 46.8 fl      MPV 12.0 fL      Platelets 162 10*3/mm3      Neutrophil % 72.8 %      Lymphocyte % 15.1 %      Monocyte % 7.9 %      Eosinophil % 3.1 %      Basophil % 0.8 %      Immature Grans % 0.3 %      Neutrophils, Absolute 4.42 10*3/mm3      Lymphocytes, Absolute 0.92 10*3/mm3      Monocytes, Absolute 0.48 10*3/mm3      Eosinophils, Absolute 0.19 10*3/mm3      Basophils, Absolute 0.05 10*3/mm3      Immature Grans, Absolute 0.02 10*3/mm3      nRBC 0.0 /100 WBC     POC Glucose Once [[id number]]  (Abnormal) Collected: 03/01/22 0625    Specimen: Blood Updated: 03/01/22 0628     Glucose 153 mg/dL      Comment: Meter: [id number] : [id number] CyberDefender PCA       POC Glucose Once [[id number]]  (Abnormal) Collected: 02/28/22 2030    Specimen: Blood Updated: 02/28/22 2032     Glucose 228 mg/dL      Comment: Meter: [id number] : [id number] Epstein Laura PCA       POC Glucose Once [[id number]]  (Abnormal) Collected: 02/28/22 1632    Specimen: Blood Updated: 02/28/22 1634     Glucose 173 mg/dL      Comment: Meter: [id number] : [id number] BoxbeRoxana NA       POC Glucose Once [[id number]]  (Abnormal) Collected: 02/28/22 1141    Specimen: Blood Updated: 02/28/22 1147     Glucose 288 mg/dL      Comment: Meter: [id number] : [id number] Ricky Roxana NA       Renal Function Panel [[id number]]  (Abnormal) Collected: 02/28/22 0531    Specimen: Blood Updated: 02/28/22 0705     Glucose 136 mg/dL      BUN 26 mg/dL      Creatinine 1.33 mg/dL      Sodium 142 mmol/L      Potassium  3.9 mmol/L      Chloride 104 mmol/L      CO2 28.8 mmol/L      Calcium 8.4 mg/dL      Albumin 2.20 g/dL      Phosphorus 2.4 mg/dL      Anion Gap 9.2 mmol/L      BUN/Creatinine Ratio 19.5     eGFR Non African Amer 51 mL/min/1.73     Narrative:      GFR Normal >60  Chronic Kidney Disease <60  Kidney Failure <15      CBC & Differential [[id number]]  (Abnormal) Collected: 02/28/22 0531    Specimen: Blood Updated: 02/28/22 0644    Narrative:      The following orders were created for panel order CBC & Differential.  Procedure                               Abnormality         Status                     ---------                               -----------         ------                     CBC Auto Differential[[id number]]        Abnormal            Final result                 Please view results for these tests on the individual orders.    CBC Auto Differential [[id number]]  (Abnormal) Collected: 02/28/22 0531    Specimen: Blood Updated: 02/28/22 0644     WBC 6.45 10*3/mm3      RBC 4.61 10*6/mm3      Hemoglobin 11.8 g/dL      Hematocrit 37.4 %      MCV 81.1 fL      MCH 25.6 pg      MCHC 31.6 g/dL      RDW 15.3 %      RDW-SD 44.5 fl      MPV 12.0 fL      Platelets 165 10*3/mm3      Neutrophil % 71.6 %      Lymphocyte % 17.1 %      Monocyte % 7.6 %      Eosinophil % 2.8 %      Basophil % 0.6 %      Immature Grans % 0.3 %      Neutrophils, Absolute 4.62 10*3/mm3      Lymphocytes, Absolute 1.10 10*3/mm3      Monocytes, Absolute 0.49 10*3/mm3      Eosinophils, Absolute 0.18 10*3/mm3      Basophils, Absolute 0.04 10*3/mm3      Immature Grans, Absolute 0.02 10*3/mm3      nRBC 0.0 /100 WBC     aPTT [[id number]]  (Abnormal) Collected: 02/28/22 0531    Specimen: Blood Updated: 02/28/22 0638     PTT 52.7 seconds     Protime-INR [[id number]]  (Abnormal) Collected: 02/28/22 0531    Specimen: Blood Updated: 02/28/22 0638     Protime 32.2 Seconds      INR 3.11    POC Glucose Once [[id number]]  (Abnormal) Collected: 02/28/22 0629    Specimen:  Blood Updated: 02/28/22 0631     Glucose 137 mg/dL      Comment: Meter: [id number] : [id number] Lucian Sanchez PCA       POC Glucose Once [[id number]]  (Abnormal) Collected: 02/27/22 2006    Specimen: Blood Updated: 02/27/22 2006     Glucose 194 mg/dL      Comment: Meter: [id number] : [id number] Lucian Arnoldey PCA       POC Glucose Once [[id number]]  (Abnormal) Collected: 02/27/22 1614    Specimen: Blood Updated: 02/27/22 1616     Glucose 210 mg/dL      Comment: Meter: [id number] : [id number] Bhardwaj Maliha NA       POC Glucose Once [[id number]]  (Abnormal) Collected: 02/27/22 1105    Specimen: Blood Updated: 02/27/22 1107     Glucose 269 mg/dL      Comment: RN Notified R and V Meter: [id number] : [id number] Mary ALEJO       POC Glucose Once [[id number]]  (Normal) Collected: 02/27/22 0656    Specimen: Blood Updated: 02/27/22 0658     Glucose 127 mg/dL      Comment: Meter: [id number] : [id number] Jorgensen Celestinevero ALEJO       Renal Function Panel [[id number]]  (Abnormal) Collected: 02/27/22 0533    Specimen: Blood Updated: 02/27/22 0618     Glucose 129 mg/dL      BUN 19 mg/dL      Creatinine 1.17 mg/dL      Sodium 136 mmol/L      Potassium 3.8 mmol/L      Chloride 99 mmol/L      CO2 31.0 mmol/L      Calcium 8.2 mg/dL      Albumin 2.60 g/dL      Phosphorus 2.7 mg/dL      Anion Gap 6.0 mmol/L      BUN/Creatinine Ratio 16.2     eGFR Non African Amer 59 mL/min/1.73     Narrative:      GFR Normal >60  Chronic Kidney Disease <60  Kidney Failure <15      aPTT [[id number]]  (Abnormal) Collected: 02/27/22 0533    Specimen: Blood Updated: 02/27/22 0611     PTT 54.9 seconds     Protime-INR [[id number]]  (Abnormal) Collected: 02/27/22 0533    Specimen: Blood Updated: 02/27/22 0611     Protime 35.2 Seconds      INR 3.48    CBC & Differential [[id number]]  (Abnormal) Collected: 02/27/22 0533    Specimen: Blood Updated: 02/27/22 0601    Narrative:      The following orders were created for panel order CBC &  Differential.  Procedure                               Abnormality         Status                     ---------                               -----------         ------                     CBC Auto Differential[[id number]]        Abnormal            Final result                 Please view results for these tests on the individual orders.    CBC Auto Differential [[id number]]  (Abnormal) Collected: 02/27/22 0533    Specimen: Blood Updated: 02/27/22 0601     WBC 5.89 10*3/mm3      RBC 4.80 10*6/mm3      Hemoglobin 12.2 g/dL      Hematocrit 38.8 %      MCV 80.8 fL      MCH 25.4 pg      MCHC 31.4 g/dL      RDW 15.2 %      RDW-SD 44.3 fl      MPV 11.2 fL      Platelets 178 10*3/mm3      Neutrophil % 73.6 %      Lymphocyte % 15.3 %      Monocyte % 7.3 %      Eosinophil % 2.7 %      Basophil % 0.8 %      Immature Grans % 0.3 %      Neutrophils, Absolute 4.33 10*3/mm3      Lymphocytes, Absolute 0.90 10*3/mm3      Monocytes, Absolute 0.43 10*3/mm3      Eosinophils, Absolute 0.16 10*3/mm3      Basophils, Absolute 0.05 10*3/mm3      Immature Grans, Absolute 0.02 10*3/mm3      nRBC 0.0 /100 WBC     POC Glucose Once [[id number]]  (Abnormal) Collected: 02/26/22 2100    Specimen: Blood Updated: 02/26/22 2101     Glucose 141 mg/dL      Comment: Meter: [id number] : [id number] Joslyn ALEJO       POC Glucose Once [[id number]]  (Abnormal) Collected: 02/26/22 1606    Specimen: Blood Updated: 02/26/22 1608     Glucose 200 mg/dL      Comment: RN Notified R and V Meter: [id number] : [id number] Mary ALEJO       POC Glucose Once [[id number]]  (Abnormal) Collected: 02/26/22 1126    Specimen: Blood Updated: 02/26/22 1131     Glucose 181 mg/dL      Comment: Meter: [id number] : [id number] Edgar Contreras RN       POC Glucose Once [[id number]]  (Normal) Collected: 02/26/22 0626    Specimen: Blood Updated: 02/26/22 0628     Glucose 109 mg/dL      Comment: Meter: [id number] : [id number] Joslyn ALEJO       Renal Function Panel  [[id number]]  (Abnormal) Collected: 02/26/22 0433    Specimen: Blood Updated: 02/26/22 0519     Glucose 118 mg/dL      BUN 17 mg/dL      Creatinine 1.16 mg/dL      Sodium 138 mmol/L      Potassium 3.7 mmol/L      Chloride 100 mmol/L      CO2 31.0 mmol/L      Calcium 8.2 mg/dL      Albumin 2.60 g/dL      Phosphorus 3.1 mg/dL      Anion Gap 7.0 mmol/L      BUN/Creatinine Ratio 14.7     eGFR Non African Amer 60 mL/min/1.73     Narrative:      GFR Normal >60  Chronic Kidney Disease <60  Kidney Failure <15      aPTT [[id number]]  (Abnormal) Collected: 02/26/22 0433    Specimen: Blood Updated: 02/26/22 0504     PTT 53.6 seconds     Protime-INR [[id number]]  (Abnormal) Collected: 02/26/22 0433    Specimen: Blood Updated: 02/26/22 0504     Protime 33.8 Seconds      INR 3.30    CBC & Differential [[id number]]  (Abnormal) Collected: 02/26/22 0433    Specimen: Blood Updated: 02/26/22 0455    Narrative:      The following orders were created for panel order CBC & Differential.  Procedure                               Abnormality         Status                     ---------                               -----------         ------                     CBC Auto Differential[[id number]]        Abnormal            Final result                 Please view results for these tests on the individual orders.    CBC Auto Differential [[id number]]  (Abnormal) Collected: 02/26/22 0433    Specimen: Blood Updated: 02/26/22 0455     WBC 6.13 10*3/mm3      RBC 4.69 10*6/mm3      Hemoglobin 12.2 g/dL      Hematocrit 38.4 %      MCV 81.9 fL      MCH 26.0 pg      MCHC 31.8 g/dL      RDW 15.2 %      RDW-SD 44.3 fl      MPV 11.8 fL      Platelets 177 10*3/mm3      Neutrophil % 73.5 %      Lymphocyte % 14.2 %      Monocyte % 8.2 %      Eosinophil % 2.9 %      Basophil % 1.0 %      Immature Grans % 0.2 %      Neutrophils, Absolute 4.51 10*3/mm3      Lymphocytes, Absolute 0.87 10*3/mm3      Monocytes, Absolute 0.50 10*3/mm3      Eosinophils, Absolute 0.18  10*3/mm3      Basophils, Absolute 0.06 10*3/mm3      Immature Grans, Absolute 0.01 10*3/mm3      nRBC 0.0 /100 WBC     POC Glucose Once [[id number]]  (Normal) Collected: 02/25/22 2115    Specimen: Blood Updated: 02/25/22 2116     Glucose 115 mg/dL      Comment: Meter: [id number] : [id number] Joslyn ALEJO       Clostridium Difficile EIA - Stool, Per Rectum [[id number]] Collected: 02/24/22 0625    Specimen: Stool from Per Rectum Updated: 02/25/22 1612     C difficile Toxins A+B, EIA Negative    Narrative:      Performed at:  [address] [place] [address]  [id number]  : Obed Paul PhD, Phone:  [phone]    POC Glucose Once [[id number]]  (Normal) Collected: 02/25/22 1528    Specimen: Blood Updated: 02/25/22 1530     Glucose 98 mg/dL      Comment: Meter: [id number] : [id number] Mary ALEJO       POC Glucose Once [[id number]]  (Abnormal) Collected: 02/25/22 1109    Specimen: Blood Updated: 02/25/22 1111     Glucose 210 mg/dL      Comment: Meter: [id number] : [id number] Mary ALEJO       Renal Function Panel [[id number]]  (Abnormal) Collected: 02/25/22 0542    Specimen: Blood Updated: 02/25/22 0636     Glucose 129 mg/dL      BUN 19 mg/dL      Creatinine 1.26 mg/dL      Sodium 140 mmol/L      Potassium 3.8 mmol/L      Chloride 103 mmol/L      CO2 30.9 mmol/L      Calcium 8.5 mg/dL      Albumin 2.30 g/dL      Phosphorus 2.7 mg/dL      Anion Gap 6.1 mmol/L      BUN/Creatinine Ratio 15.1     eGFR Non African Amer 55 mL/min/1.73     Narrative:      GFR Normal >60  Chronic Kidney Disease <60  Kidney Failure <15      POC Glucose Once [[id number]]  (Abnormal) Collected: 02/25/22 0627    Specimen: Blood Updated: 02/25/22 0628     Glucose 134 mg/dL      Comment: Meter: [id number] : [id number] Lucian Sanchez PCA       aPTT [[id number]]  (Abnormal) Collected: 02/25/22 0542    Specimen: Blood Updated: 02/25/22 0624     PTT 56.1 seconds     Protime-INR [[id number]]  (Abnormal)  Collected: 02/25/22 0542    Specimen: Blood Updated: 02/25/22 0624     Protime 34.4 Seconds      INR 3.37    CBC & Differential [[id number]]  (Abnormal) Collected: 02/25/22 0542    Specimen: Blood Updated: 02/25/22 0609    Narrative:      The following orders were created for panel order CBC & Differential.  Procedure                               Abnormality         Status                     ---------                               -----------         ------                     CBC Auto Differential[[id number]]        Abnormal            Final result                 Please view results for these tests on the individual orders.    CBC Auto Differential [[id number]]  (Abnormal) Collected: 02/25/22 0542    Specimen: Blood Updated: 02/25/22 0609     WBC 6.02 10*3/mm3      RBC 4.62 10*6/mm3      Hemoglobin 12.0 g/dL      Hematocrit 37.7 %      MCV 81.6 fL      MCH 26.0 pg      MCHC 31.8 g/dL      RDW 15.6 %      RDW-SD 46.3 fl      MPV 10.9 fL      Platelets 175 10*3/mm3      Neutrophil % 74.7 %      Lymphocyte % 14.3 %      Monocyte % 6.8 %      Eosinophil % 3.0 %      Basophil % 0.7 %      Immature Grans % 0.5 %      Neutrophils, Absolute 4.50 10*3/mm3      Lymphocytes, Absolute 0.86 10*3/mm3      Monocytes, Absolute 0.41 10*3/mm3      Eosinophils, Absolute 0.18 10*3/mm3      Basophils, Absolute 0.04 10*3/mm3      Immature Grans, Absolute 0.03 10*3/mm3      nRBC 0.0 /100 WBC     POC Glucose Once [[id number]]  (Abnormal) Collected: 02/24/22 2025    Specimen: Blood Updated: 02/24/22 2026     Glucose 152 mg/dL      Comment: Meter: [id number] : [id number] Lucian BURKS       POC Glucose Once [[id number]]  (Abnormal) Collected: 02/24/22 1621    Specimen: Blood Updated: 02/24/22 1622     Glucose 213 mg/dL      Comment: Meter: [id number] : [id number] Valerie ALEJO       POC Glucose Once [[id number]]  (Abnormal) Collected: 02/24/22 1114    Specimen: Blood Updated: 02/24/22 1118     Glucose 192 mg/dL      Comment: Meter:  [id number] : [id number] Padilla Sravanthi ALEJO       POC Glucose Once [[id number]]  (Abnormal) Collected: 02/24/22 0623    Specimen: Blood Updated: 02/24/22 0630     Glucose 142 mg/dL      Comment: Meter: [id number] : [id number] Lucian BURKS       Renal Function Panel [[id number]]  (Abnormal) Collected: 02/24/22 0444    Specimen: Blood Updated: 02/24/22 0557     Glucose 125 mg/dL      BUN 17 mg/dL      Creatinine 0.92 mg/dL      Sodium 140 mmol/L      Potassium 3.8 mmol/L      Chloride 105 mmol/L      CO2 26.4 mmol/L      Calcium 8.2 mg/dL      Albumin 2.20 g/dL      Phosphorus 2.7 mg/dL      Anion Gap 8.6 mmol/L      BUN/Creatinine Ratio 18.5     eGFR Non African Amer 78 mL/min/1.73     Narrative:      GFR Normal >60  Chronic Kidney Disease <60  Kidney Failure <15      aPTT [[id number]]  (Abnormal) Collected: 02/24/22 0444    Specimen: Blood Updated: 02/24/22 0538     PTT 59.0 seconds     Protime-INR [[id number]]  (Abnormal) Collected: 02/24/22 0444    Specimen: Blood Updated: 02/24/22 0538     Protime 34.4 Seconds      INR 3.37    CBC & Differential [[id number]]  (Abnormal) Collected: 02/24/22 0444    Specimen: Blood Updated: 02/24/22 0531    Narrative:      The following orders were created for panel order CBC & Differential.  Procedure                               Abnormality         Status                     ---------                               -----------         ------                     CBC Auto Differential[[id number]]        Abnormal            Final result                 Please view results for these tests on the individual orders.    CBC Auto Differential [[id number]]  (Abnormal) Collected: 02/24/22 0444    Specimen: Blood Updated: 02/24/22 0531     WBC 6.15 10*3/mm3      RBC 4.60 10*6/mm3      Hemoglobin 11.9 g/dL      Hematocrit 36.0 %      MCV 78.3 fL      MCH 25.9 pg      MCHC 33.1 g/dL      RDW 15.7 %      RDW-SD 43.8 fl      MPV 10.9 fL      Platelets 161 10*3/mm3      Neutrophil % 74.6 %       "Lymphocyte % 13.8 %      Monocyte % 7.5 %      Eosinophil % 3.1 %      Basophil % 0.7 %      Immature Grans % 0.3 %      Neutrophils, Absolute 4.59 10*3/mm3      Lymphocytes, Absolute 0.85 10*3/mm3      Monocytes, Absolute 0.46 10*3/mm3      Eosinophils, Absolute 0.19 10*3/mm3      Basophils, Absolute 0.04 10*3/mm3      Immature Grans, Absolute 0.02 10*3/mm3      nRBC 0.0 /100 WBC     POC Glucose Once [[id number]]  (Abnormal) Collected: 02/23/22 2026    Specimen: Blood Updated: 02/23/22 2028     Glucose 241 mg/dL      Comment: Meter: [id number] : [id number] Lucian Arnoldey PCA       POC Glucose Once [[id number]]  (Abnormal) Collected: 02/23/22 1605    Specimen: Blood Updated: 02/23/22 1612     Glucose 141 mg/dL      Comment: Meter: [id number] : [id number] Mary ALEJO           /68 (BP Location: Left arm, Patient Position: Sitting)   Pulse 89   Temp 97.8 °F (36.6 °C) (Oral)   Resp 16   Ht 182.9 cm (72.01\")   Wt 62.7 kg (138 lb 4.8 oz)   SpO2 94%   BMI 18.75 kg/m²     Discharge Exam:  General Appearance:    Alert, cooperative, no distress                          Head:    Normocephalic, without obvious abnormality, atraumatic                          Eyes:                            Throat:   Lips, tongue, gums normal                          Neck:   Supple, symmetrical, trachea midline, no JVD                        Lungs:     Clear to auscultation bilaterally, respirations unlabored                Chest Wall:    No tenderness or deformity                        Heart:    Regular rate and rhythm, S1 and S2 normal, no murmur,no  Rub or gallop                  Abdomen:     Soft, non-tender, bowel sounds active, no masses, no organomegaly                  Extremities:   Extremities normal, atraumatic, no cyanosis or edema                             Skin:   Skin is warm and dry,  no rashes or palpable lesions                  Neurologic:   no focal deficits noted     Disposition:  Home with home " health    Patient Instructions:      Discharge Medications      New Medications      Instructions Start Date   colestipol 1 g tablet  Commonly known as: Colestid   1 g, Oral, 2 Times Daily      famotidine 20 MG tablet  Commonly known as: PEPCID   20 mg, Oral, 2 Times Daily Before Meals      lactobacillus acidophilus capsule capsule   1 capsule, Oral, Daily   Start Date: March 3, 2022     polycarbophil 625 MG tablet tablet   1,250 mg, Oral, 2 Times Daily With Meals      saccharomyces boulardii 250 MG capsule  Commonly known as: FLORASTOR   250 mg, Oral, 2 Times Daily      sodium bicarbonate 650 MG tablet   1,300 mg, Oral, 3 Times Daily      vancomycin 125 MG capsule  Commonly known as: VANCOCIN   125 mg, Oral, Every 6 Hours Scheduled      vancomycin 125 MG capsule  Commonly known as: VANCOCIN   125 mg, Oral, Every 12 Hours   Start Date: March 5, 2022     vancomycin 125 MG capsule  Commonly known as: VANCOCIN   125 mg, Oral, Every 24 Hours   Start Date: March 12, 2022     vancomycin 125 MG capsule  Commonly known as: VANCOCIN   125 mg, Oral, Every Other Day   Start Date: March 19, 2022        Changes to Medications      Instructions Start Date   metoprolol succinate XL 25 MG 24 hr tablet  Commonly known as: TOPROL-XL  What changed: how much to take   12.5 mg, Oral, Daily   Start Date: March 3, 2022        Continue These Medications      Instructions Start Date   aspirin 81 MG EC tablet   81 mg, Oral, Daily      atorvastatin 40 MG tablet  Commonly known as: LIPITOR   40 mg, Oral, Daily      digoxin 125 MCG tablet  Commonly known as: LANOXIN   TAKE ONE TABLET BY MOUTH DAILY      diphenhydrAMINE 25 mg capsule  Commonly known as: BENADRYL   25 mg, Oral, 2 Times Daily PRN      ferrous gluconate 324 MG tablet  Commonly known as: FERGON   324 mg, Oral, Every Other Day      fish oil 1000 MG capsule capsule   4,000 mg, Oral, Daily With Breakfast      furosemide 40 MG tablet  Commonly known as: Lasix   40 mg, Oral, Daily       insulin lispro 100 UNIT/ML injection  Commonly known as: humaLOG   USE AS DIRECTED WITH V-GO PUMP      [employer] Autolet Lancing Device misc   1 each, Does not apply, 3 Times Daily      [employer] Blood Glucose kit   1 each, Does not apply, 3 Times Daily      [employer] Blood Glucose Test test strip  Generic drug: glucose blood   1 each, Other, 3 Times Daily      magnesium oxide 400 MG tablet  Commonly known as: MAG-OX   400 mg, Oral, 2 Times Daily      Pen Needles 32G X 4 MM misc   1 each, Does not apply, Daily PRN, Use with Tresiba       TRESIBA FLEXTOUCH SC   30-65 Units, Subcutaneous, Daily PRN, Use as directed if blood sugar gets too high      V-Go 40 kit   USE AS DIRECTED THREE TIMES A DAY      warfarin 5 MG tablet  Commonly known as: COUMADIN   TAKE 1 AND 1/2 TABLET BY MOUTH DAILY ON MONDAY, WEDNESDAY, AND FRIDAY.  TAKE ONE TABLET BY MOUTH DAILY ON ALL OTHER DAYS OF THE WEEK         Stop These Medications    azithromycin 500 MG tablet  Commonly known as: ZITHROMAX     guaiFENesin 600 MG 12 hr tablet  Commonly known as: MUCINEX     mupirocin 2 % ointment  Commonly known as: BACTROBAN     pantoprazole 40 MG EC tablet  Commonly known as: PROTONIX     rifAXIMin 200 MG tablet  Commonly known as: Xifaxan          Future Appointments   Date Time Provider Department Center   3/15/2022  9:00 AM Chery Copeland PA-C [facility]   3/16/2022  9:20 AM Harika Alanis APRN [facility]   3/21/2022  8:15 AM Epi Hinkle APRN [facility]   4/1/2022 11:10 AM Kunal Copeland DO [facility]   8/3/2022  8:00 AM Griffin Fried MD [facility]     Additional Instructions for the Follow-ups that You Need to Schedule     Ambulatory Referral to Home Health (Hospital)   As directed      Face to Face Visit Date: 3/2/2022    Follow-up provider for Plan of Care?: I treated the patient in an acute care facility and will not continue treatment after discharge.    Follow-up provider: EPI HINKLE [[id number]]     Reason/Clinical Findings: weak    Describe mobility limitations that make leaving home difficult: weakness    Nursing/Therapeutic Services Requested: Skilled Nursing Physical Therapy    Skilled nursing orders: Other    PT orders: Therapeutic exercise    Frequency: 1 Week 1            Contact information for follow-up providers     Rubin Hinkle APRN Follow up in 1 week(s).    Specialties: Family Medicine, Urgent Care, Emergency Medicine  [facility] information:  [address] [facility] [id number] [phone]                   Contact information for after-discharge care     Home Medical Care     [facility] .    Service: Home Health Services  Contact information:  [address]  [facility] [address] [phone]                           Discharge Order (From admission, onward)     Start     Ordered    03/02/22 1210  Discharge patient  Once        Expected Discharge Date: 03/02/22    Discharge Disposition: Home-Health Care [facility]    Physician of Record for Attribution - Please select from Treatment Team: KIEL JUAREZ [[id number]]    Review needed by CMO to determine Physician of Record: No       Question Answer Comment   Physician of Record for Attribution - Please select from Treatment Team KIEL JUAREZ    Review needed by CMO to determine Physician of Record No        03/02/22 1214                Total time spent discharging patient including evaluation,post hospitalization follow up,  medication and post hospitalization instructions and education total time exceeds 30 minutes.    Signed:  Kiel Juarez MD  3/2/2022  12:15 EST

## 2022-03-02 NOTE — PROGRESS NOTES
[facility] will follow post hospital. Patient/spouse agreeable to service. Contact information confirmed.

## 2022-03-02 NOTE — PROGRESS NOTES
[facility]  Inpatient Progress Note    Reason for Follow Up:  C diff associated diarrhea    Subjective     Interval History:   Feeling better.  Stools more formed.  Was able to make it to the toilet in time this morning.  Anxious for home.    Current Facility-Administered Medications:   •  acetaminophen (TYLENOL) tablet 650 mg, 650 mg, Oral, Q4H PRN **OR** acetaminophen (TYLENOL) 160 MG/5ML solution 650 mg, 650 mg, Oral, Q4H PRN **OR** acetaminophen (TYLENOL) suppository 650 mg, 650 mg, Rectal, Q4H PRN, Carmen Welch APRN  •  aspirin EC tablet 81 mg, 81 mg, Oral, Daily, Val Montgomery APRN, 81 mg at 03/01/22 0849  •  atorvastatin (LIPITOR) tablet 40 mg, 40 mg, Oral, Daily, Val Montgomery APRN, 40 mg at 03/01/22 0849  •  calcium carbonate (TUMS) chewable tablet 500 mg (200 mg elemental), 2 tablet, Oral, BID PRN, Carmen Welch APRN  •  colestipol (COLESTID) tablet 1 g, 1 g, Oral, TID Shima RAMOS Shelley C, APRN, 1 g at 03/01/22 1758  •  dextrose (D50W) (25 g/50 mL) IV injection 25 g, 25 g, Intravenous, Q15 Min PRN, Carmen Welch APRN  •  dextrose (GLUTOSE) oral gel 15 g, 15 g, Oral, Q15 Min PRN, Carmen Welch APRN  •  digoxin (LANOXIN) tablet 125 mcg, 125 mcg, Oral, Daily, Val Montgomery APRN, 125 mcg at 03/01/22 0848  •  diphenhydrAMINE (BENADRYL) capsule 25 mg, 25 mg, Oral, BID PRN, Val Montgomery APRN  •  famotidine (PEPCID) tablet 20 mg, 20 mg, Oral, BID AC, Val Montgomery APRN, 20 mg at 03/02/22 0630  •  ferrous sulfate tablet 325 mg, 325 mg, Oral, Every Other Day, Val Montgomery APRN, 325 mg at 03/01/22 0848  •  furosemide (LASIX) tablet 40 mg, 40 mg, Oral, Daily, Silvia Law MD, 40 mg at 03/01/22 0849  •  glucagon (human recombinant) (GLUCAGEN DIAGNOSTIC) injection 1 mg, 1 mg, Subcutaneous, PRN, Carmen Welch, APRN  •  insulin lispro (ADMELOG) injection 0-7 Units, 0-7 Units, Subcutaneous, TID AC, Carmen Welch APRN, 2 Units  at 03/01/22 1756  •  lactobacillus acidophilus (RISAQUAD) capsule 1 capsule, 1 capsule, Oral, Daily, Kunal Copeland DO, 1 capsule at 03/01/22 0849  •  metoprolol succinate XL (TOPROL-XL) 24 hr tablet 12.5 mg, 12.5 mg, Oral, Daily, Mady Montes MD, 12.5 mg at 03/01/22 0849  •  ondansetron (ZOFRAN) tablet 4 mg, 4 mg, Oral, Q6H PRN **OR** ondansetron (ZOFRAN) injection 4 mg, 4 mg, Intravenous, Q6H PRN, Carmen Welch APRN, 4 mg at 02/17/22 0654  •  Pharmacy Consult, , Does not apply, Continuous PRN, Grant Albarran PA  •  Pharmacy to dose warfarin, , Does not apply, Continuous PRN, Val Montgomery APRN  •  polycarbophil tablet 1,250 mg, 1,250 mg, Oral, BID With Meals, Sri White PA, 1,250 mg at 03/01/22 1756  •  saccharomyces boulardii (FLORASTOR) capsule 250 mg, 250 mg, Oral, BID, Gabriela Omalley APRN, 250 mg at 03/01/22 2155  •  sodium bicarbonate tablet 1,300 mg, 1,300 mg, Oral, TID, Ry Martin MD, 1,300 mg at 03/01/22 2155  •  sodium chloride 0.9 % flush 10 mL, 10 mL, Intravenous, PRN, Isaias Osorio II, MD  •  sodium chloride 0.9 % flush 10 mL, 10 mL, Intravenous, Q12H, Carmen Welch APRN, 10 mL at 03/01/22 2212  •  sodium chloride 0.9 % flush 10 mL, 10 mL, Intravenous, PRN, Carmen Welch APRN  •  vancomycin (VANCOCIN) capsule 125 mg, 125 mg, Oral, Q6H, Kunal Copeland, DO, 125 mg at 03/02/22 0630  •  [START ON 3/5/2022] vancomycin (VANCOCIN) capsule 125 mg, 125 mg, Oral, Q12H, Kunal Copeland,   •  [START ON 3/12/2022] vancomycin (VANCOCIN) capsule 125 mg, 125 mg, Oral, Q24H, Kunal Copeland DO  •  [START ON 3/19/2022] vancomycin (VANCOCIN) capsule 125 mg, 125 mg, Oral, Every Other Day, Kunal Copeland DO  •  warfarin (COUMADIN) tablet 5 mg, 5 mg, Oral, Once per day on Sun Tue Thu Sat, Renato Quinonez MD, 5 mg at 03/01/22 1756  •  warfarin (COUMADIN) tablet 7.5 mg, 7.5 mg, Oral, Once per day on Mon Wed Fri, Val Montgomery  M, APRN, 7.5 mg at 02/28/22 1710  Review of Systems:   All systems reviewed and negative except for: GI: Fecal urgency    Objective     Vital Signs  Temp:  [96.9 °F (36.1 °C)-97.8 °F (36.6 °C)] 97.8 °F (36.6 °C)  Heart Rate:  [34-89] 89  Resp:  [16-18] 16  BP: (101-135)/(58-68) 101/68  Body mass index is 18.75 kg/m².  No intake or output data in the 24 hours ending 03/02/22 0818  No intake/output data recorded.     Physical Exam:   General: patient awake, alert and cooperative, appears stated age   Eyes: Normal lids and lashes, no scleral icterus   Neck: supple, normal ROM   Skin: warm and dry, not jaundiced   Cardiovascular: regular rhythm and rate, no murmurs auscultated   Pulm: clear to auscultation bilaterally, regular and unlabored   Abdomen: soft, nontender, nondistended; normal bowel sounds   Rectal: deferred   Extremities: no rash or edema   Psychiatric: Normal mood and behavior; memory intact     Results Review:     I reviewed the patient's new clinical results.    Results from last 7 days   Lab Units 03/02/22 0435 03/01/22 0537 02/28/22  0531   WBC 10*3/mm3 5.61 6.08 6.45   HEMOGLOBIN g/dL 12.0* 11.7* 11.8*   HEMATOCRIT % 38.2 38.1 37.4*   PLATELETS 10*3/mm3 155 162 165     Results from last 7 days   Lab Units 03/02/22 0435 03/01/22 0537 02/28/22  0531   SODIUM mmol/L 142 139 142   POTASSIUM mmol/L 4.1 3.9 3.9   CHLORIDE mmol/L 105 100 104   CO2 mmol/L 28.9 31.0* 28.8   BUN mg/dL 31* 28* 26*   CREATININE mg/dL 1.21 1.23 1.33*   CALCIUM mg/dL 8.8 8.3* 8.4*   GLUCOSE mg/dL 167* 144* 136*     Results from last 7 days   Lab Units 03/02/22 0435 03/01/22 0537 02/28/22  0531   INR  2.80* 2.89* 3.11*     Lab Results   Lab Value Date/Time    LIPASE 18 02/14/2022 3013       Radiology:  No orders to display       Assessment/Plan     Patient Active Problem List   Diagnosis   • Atrial fibrillation (HCC)   • Hypertension   • Atopic rhinitis   • Gastroesophageal reflux disease   • Hyperlipidemia   • Type 2 diabetes  mellitus (HCC)   • Low testosterone   • Medicare annual wellness visit, subsequent   • Hx of aortic valve replacement, 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 difficile colitis       Impression  1.  C. difficile colitis: Responding to vancomycin    2.  Diarrhea with fecal incontinence: With above, improving    3.  History of colonic angiectasia: Last colonoscopy November 2021    4.  A. fib, mechanical heart valve: On warfarin    Plan  Continue vancomycin  Continue fiber supplementation  Continue colestipol  Continue diet as tolerated  Okay for home from GI standpoint-yes both patient and wife feel comfortable going home  He has follow-up March 15 in our office and should keep that appointment    GI will sign off but remain available as needed in this patient's care.  Thank you.      I discussed the patients findings and my recommendations with patient and family.    All necessary PPE, including face mask and eye protection, were worn during this encounter.  Hand sanitization was performed both before and after the patient interaction.    Over 25 minutes was spent reviewing the patient's chart and records, in discussion with the patient, in examination of the patient, and in discussion with members of the patient's medical team.    Tatiana Tinoco MD

## 2022-03-02 NOTE — OUTREACH NOTE
Prep Survey      Responses   [facility] patient discharged from? [place]   Is LACE score < 7 ? No   Emergency Room discharge w/ pulse ox? No   Eligibility [facility]   Date of Admission 02/14/22   Date of Discharge 03/02/22   Discharge Disposition Home-Health Care Sv   Discharge diagnosis HTN, GERD, C DIFF   Does the patient have one of the following disease processes/diagnoses(primary or secondary)? Other   Does the patient have Home health ordered? Yes   What is the Home health agency?  [facility]   Is there a DME ordered? No   Prep survey completed? Yes          Jenn Maza RN

## 2022-03-02 NOTE — PROGRESS NOTES
[facility] Progress Note      Patient Name: Francisco Sequeira  : 1935  MRN: [id number]  Primary Care Physician:  Rubin Hinkle APRN  Date of admission: 2022    Subjective     Interval History:   F/u BYRON on  CKD3  The patient is feeling the same, appetite is reasonably good today, no chest pain or shortness of air, no orthopnea or PND, no nausea or vomiting, no abdominal pain, his fecal incontinence has resolved over the past 24 hours still have loose bowel movement    Review of Systems:   As noted above    Objective     Vitals:   Temp:  [96.9 °F (36.1 °C)-97.8 °F (36.6 °C)] 97.8 °F (36.6 °C)  Heart Rate:  [34-89] 89  Resp:  [16-18] 16  BP: (101-135)/(58-68) 101/68  No intake or output data in the 24 hours ending 22 0838    Physical Exam:    General Appearance: alert, oriented x 3, no acute distress,, appears to be chronically ill  Skin: warm and dry  HEENT: pupils round and reactive to light, oral mucosa normal,, temporal wasting nonicteric sclera  Neck: supple, no JVD, trachea midline  Lungs: CTA, unlabored breathing effort  Heart: Irregularly irregular, no rub  Abdomen: soft, nontender, normoactive bowels  : no palpable bladder,  Extremities: no edema, cyanosis or clubbing  Neuro: normal speech and mental status          Scheduled Meds:     aspirin, 81 mg, Oral, Daily  atorvastatin, 40 mg, Oral, Daily  colestipol, 1 g, Oral, TID AC  digoxin, 125 mcg, Oral, Daily  famotidine, 20 mg, Oral, BID AC  ferrous sulfate, 325 mg, Oral, Every Other Day  furosemide, 40 mg, Oral, Daily  insulin lispro, 0-7 Units, Subcutaneous, TID AC  lactobacillus acidophilus, 1 capsule, Oral, Daily  metoprolol succinate XL, 12.5 mg, Oral, Daily  calcium polycarbophil, 1,250 mg, Oral, BID With Meals  saccharomyces boulardii, 250 mg, Oral, BID  sodium bicarbonate, 1,300 mg, Oral, TID  sodium chloride, 10 mL, Intravenous, Q12H  vancomycin, 125 mg, Oral, Q6H  [START ON 3/5/2022] vancomycin, 125  mg, Oral, Q12H  [START ON 3/12/2022] vancomycin, 125 mg, Oral, Q24H  [START ON 3/19/2022] vancomycin, 125 mg, Oral, Every Other Day  warfarin, 5 mg, Oral, Once per day on Sun Tue Thu Sat  warfarin, 7.5 mg, Oral, Once per day on Mon Wed Fri      IV Meds:   Pharmacy Consult,   Pharmacy to dose warfarin,         Results Reviewed:   I have personally reviewed the results from the time of this admission to 3/2/2022 08:38 EST     Results from last 7 days   Lab Units 03/02/22 0435 03/01/22 0537 02/28/22  0531   SODIUM mmol/L 142 139 142   POTASSIUM mmol/L 4.1 3.9 3.9   CHLORIDE mmol/L 105 100 104   CO2 mmol/L 28.9 31.0* 28.8   BUN mg/dL 31* 28* 26*   CREATININE mg/dL 1.21 1.23 1.33*   CALCIUM mg/dL 8.8 8.3* 8.4*   GLUCOSE mg/dL 167* 144* 136*     Estimated Creatinine Clearance: 40.3 mL/min (by C-G formula based on SCr of 1.21 mg/dL).  Results from last 7 days   Lab Units 03/02/22 0435 03/01/22 0537 02/28/22  0531   MAGNESIUM mg/dL 2.4 2.1  --    PHOSPHORUS mg/dL 2.8 2.6 2.4*     Results from last 7 days   Lab Units 03/02/22 0435 03/01/22 0537   URIC ACID mg/dL 6.9 7.2*     Results from last 7 days   Lab Units 03/02/22 0435 03/01/22 0537 02/28/22  0531 02/27/22  0533 02/26/22  0433   WBC 10*3/mm3 5.61 6.08 6.45 5.89 6.13   HEMOGLOBIN g/dL 12.0* 11.7* 11.8* 12.2* 12.2*   PLATELETS 10*3/mm3 155 162 165 178 177     Results from last 7 days   Lab Units 03/02/22 0435 03/01/22 0537 02/28/22  0531 02/27/22  0533 02/26/22  0433   INR  2.80* 2.89* 3.11* 3.48* 3.30*       Assessment / Plan     ASSESSMENT:  1. BYRON, CKD 3.  BYRON resolved, Cr stable 1.1 - 1.2 range (BL Cr 1.2 - 1.4).  Creatinine today 1.21, electrolyte within acceptable range  2. C. Dif colitis, he had recurrent diarrhea followed by GI, treated with oral vancomycin  3. Chronic diastolic heart failure - compensated on oral Lasix  4, Afib, controlled rate  5. SP AVR, mechanical.  6. DM2, with renal complication  7.  Sarcopenia and protein calorie malnutrition  Albumin 2.6, on protein supplement    PLAN:  1.  Continue the same treatment and continue protein supplement  2.  Surveillance labs  3.  If the patient is discharged today I would like to see him in my office in about 4 weeks      Jeff Dominguez MD  03/02/22  08:38 [facility]    [facility]  [phone]

## 2022-03-02 NOTE — PLAN OF CARE
Problem: Fall Injury Risk  Goal: Absence of Fall and Fall-Related Injury  3/2/2022 0416 by Araceli Collier RN  Outcome: Ongoing, Progressing  3/2/2022 0317 by Araceli Collier RN  Outcome: Ongoing, Progressing  Intervention: Identify and Manage Contributors to Fall Injury Risk  Recent Flowsheet Documentation  Taken 3/2/2022 0022 by Araceli Collier RN  Medication Review/Management: medications reviewed  Taken 3/1/2022 2159 by Araceli Collier RN  Medication Review/Management: medications reviewed  Intervention: Promote Injury-Free Environment  Recent Flowsheet Documentation  Taken 3/2/2022 0414 by Araceli Collier RN  Safety Promotion/Fall Prevention:   safety round/check completed   room organization consistent   clutter free environment maintained   assistive device/personal items within reach  Taken 3/2/2022 0224 by Araceli Collier RN  Safety Promotion/Fall Prevention:   safety round/check completed   room organization consistent   clutter free environment maintained   assistive device/personal items within reach  Taken 3/2/2022 0022 by Araceli Collier RN  Safety Promotion/Fall Prevention:   safety round/check completed   room organization consistent   clutter free environment maintained   assistive device/personal items within reach  Taken 3/1/2022 2254 by Araceli Collier RN  Safety Promotion/Fall Prevention:   safety round/check completed   room organization consistent   clutter free environment maintained   assistive device/personal items within reach  Taken 3/1/2022 2159 by Araceli Collier RN  Safety Promotion/Fall Prevention:   safety round/check completed   room organization consistent   clutter free environment maintained   assistive device/personal items within reach     Problem: Adult Inpatient Plan of Care  Goal: Plan of Care Review  3/2/2022 0416 by Araceli Collier RN  Outcome: Ongoing, Progressing  Flowsheets (Taken 3/2/2022 0416)  Progress: improving  Plan of Care Reviewed  With: patient  Outcome Summary: no c.o pain. PO antibotics. no BM during shift. HR as low as 30s, asymptomatic. EKG performed and MD notified. resting well throughout shift.  3/2/2022 0317 by Araceli Collier RN  Outcome: Ongoing, Progressing  Goal: Patient-Specific Goal (Individualized)  3/2/2022 0416 by Araceli Collier RN  Outcome: Ongoing, Progressing  3/2/2022 0317 by Araceli Collier RN  Outcome: Ongoing, Progressing  Goal: Absence of Hospital-Acquired Illness or Injury  3/2/2022 0416 by Araceli Collier RN  Outcome: Ongoing, Progressing  3/2/2022 0317 by Araceli Collier RN  Outcome: Ongoing, Progressing  Intervention: Identify and Manage Fall Risk  Recent Flowsheet Documentation  Taken 3/2/2022 0414 by Araceli Collier RN  Safety Promotion/Fall Prevention:   safety round/check completed   room organization consistent   clutter free environment maintained   assistive device/personal items within reach  Taken 3/2/2022 0224 by Araceli Collier RN  Safety Promotion/Fall Prevention:   safety round/check completed   room organization consistent   clutter free environment maintained   assistive device/personal items within reach  Taken 3/2/2022 0022 by Araceli Collier RN  Safety Promotion/Fall Prevention:   safety round/check completed   room organization consistent   clutter free environment maintained   assistive device/personal items within reach  Taken 3/1/2022 2254 by Araceli Collier RN  Safety Promotion/Fall Prevention:   safety round/check completed   room organization consistent   clutter free environment maintained   assistive device/personal items within reach  Taken 3/1/2022 2159 by Araceli Collier RN  Safety Promotion/Fall Prevention:   safety round/check completed   room organization consistent   clutter free environment maintained   assistive device/personal items within reach  Intervention: Prevent Skin Injury  Recent Flowsheet Documentation  Taken 3/1/2022 2159 by Nando  VITALIY Charles  Skin Protection:   incontinence pads utilized   tubing/devices free from skin contact  Intervention: Prevent and Manage VTE (venous thromboembolism) Risk  Recent Flowsheet Documentation  Taken 3/2/2022 0022 by Araceli Collier RN  VTE Prevention/Management:   sequential compression devices off   patient refused intervention  Taken 3/1/2022 2159 by Araceli Collier RN  VTE Prevention/Management:   bilateral   dorsiflexion/plantar flexion performed   sequential compression devices off   patient refused intervention  Intervention: Prevent Infection  Recent Flowsheet Documentation  Taken 3/2/2022 0414 by Araceli Collier RN  Infection Prevention: rest/sleep promoted  Taken 3/2/2022 0224 by Araceli Collier RN  Infection Prevention: rest/sleep promoted  Taken 3/2/2022 0022 by Araceli Collier RN  Infection Prevention: rest/sleep promoted  Taken 3/1/2022 2254 by Araceli Collier RN  Infection Prevention: rest/sleep promoted  Taken 3/1/2022 2159 by Araceli Collier RN  Infection Prevention: rest/sleep promoted  Goal: Optimal Comfort and Wellbeing  3/2/2022 0416 by Araceli Collier RN  Outcome: Ongoing, Progressing  3/2/2022 0317 by Araceli Collier RN  Outcome: Ongoing, Progressing  Intervention: Provide Person-Centered Care  Recent Flowsheet Documentation  Taken 3/2/2022 0022 by Araceli Collier RN  Trust Relationship/Rapport:   care explained   emotional support provided   empathic listening provided  Taken 3/1/2022 2159 by Araceli Collier RN  Trust Relationship/Rapport:   care explained   emotional support provided   empathic listening provided  Goal: Readiness for Transition of Care  3/2/2022 0416 by Araceli Collier RN  Outcome: Ongoing, Progressing  3/2/2022 0317 by Araceli Collier RN  Outcome: Ongoing, Progressing     Problem: Skin Injury Risk Increased  Goal: Skin Health and Integrity  3/2/2022 0416 by Araceli Collier RN  Outcome: Ongoing, Progressing  3/2/2022 0317 by  Araceli Collier RN  Outcome: Ongoing, Progressing  Intervention: Optimize Skin Protection  Recent Flowsheet Documentation  Taken 3/1/2022 2159 by Araceli Collier RN  Pressure Reduction Techniques: frequent weight shift encouraged  Pressure Reduction Devices: alternating pressure pump (ADD)  Skin Protection:   incontinence pads utilized   tubing/devices free from skin contact     Problem: Diarrhea  Goal: Fluid and Electrolyte Balance  3/2/2022 0416 by Araceli Collier RN  Outcome: Ongoing, Progressing  3/2/2022 0317 by Araceli Collier RN  Outcome: Ongoing, Progressing  Intervention: Manage Diarrhea  Recent Flowsheet Documentation  Taken 3/2/2022 0022 by Araceli Collier RN  Medication Review/Management: medications reviewed  Taken 3/1/2022 2159 by Araceli Collier RN  Medication Review/Management: medications reviewed     Problem: Adjustment to Illness (Chronic Kidney Disease)  Goal: Optimal Coping with Chronic Illness  3/2/2022 0416 by Araceli Collier RN  Outcome: Ongoing, Progressing  3/2/2022 0317 by Araceli Collier RN  Outcome: Ongoing, Progressing  Intervention: Support and Optimize Psychosocial Response to Chronic Illness  Recent Flowsheet Documentation  Taken 3/2/2022 0022 by Araceli Collier RN  Family/Support System Care:   support provided   self-care encouraged  Taken 3/1/2022 2159 by Araceli Collier RN  Family/Support System Care:   support provided   self-care encouraged     Problem: Electrolyte Imbalance (Chronic Kidney Disease)  Goal: Electrolyte Balance  3/2/2022 0416 by Araceli Collier RN  Outcome: Ongoing, Progressing  3/2/2022 0317 by Araceli Collier RN  Outcome: Ongoing, Progressing     Problem: Fluid Volume Excess (Chronic Kidney Disease)  Goal: Fluid Balance  3/2/2022 0416 by Araceli Collier RN  Outcome: Ongoing, Progressing  3/2/2022 0317 by Araceli Collier RN  Outcome: Ongoing, Progressing  Intervention: Monitor and Manage Hypervolemia  Recent Flowsheet  Documentation  Taken 3/1/2022 2159 by Araceli Collier RN  Skin Protection:   incontinence pads utilized   tubing/devices free from skin contact     Problem: Functional Decline (Chronic Kidney Disease)  Goal: Optimal Functional Ability  3/2/2022 0416 by Araceli Collier RN  Outcome: Ongoing, Progressing  3/2/2022 0317 by Araceli Collier RN  Outcome: Ongoing, Progressing     Problem: Hematologic Alteration (Chronic Kidney Disease)  Goal: Absence of Anemia Signs and Symptoms  3/2/2022 0416 by Araceli Collier RN  Outcome: Ongoing, Progressing  3/2/2022 0317 by Araceli Collier RN  Outcome: Ongoing, Progressing     Problem: Oral Intake Inadequate (Chronic Kidney Disease)  Goal: Optimal Oral Intake  3/2/2022 0416 by Araceli Collier RN  Outcome: Ongoing, Progressing  3/2/2022 0317 by Araceli Collier RN  Outcome: Ongoing, Progressing     Problem: Renal Function Impairment (Chronic Kidney Disease)  Goal: Laboratory Values and Blood Pressure Within Desired Range  3/2/2022 0416 by Araceli Collier RN  Outcome: Ongoing, Progressing  3/2/2022 0317 by Araceli Collier RN  Outcome: Ongoing, Progressing  Intervention: Monitor and Support Renal Function  Recent Flowsheet Documentation  Taken 3/2/2022 0022 by Araceli Collier RN  Medication Review/Management: medications reviewed  Taken 3/1/2022 2159 by Araceli Collier RN  Medication Review/Management: medications reviewed     Problem: Diabetes Comorbidity  Goal: Blood Glucose Level Within Desired Range  3/2/2022 0416 by Araceli Collier RN  Outcome: Ongoing, Progressing  3/2/2022 0317 by Araceli Collier RN  Outcome: Ongoing, Progressing     Problem: Hypertension Comorbidity  Goal: Blood Pressure in Desired Range  3/2/2022 0416 by Araceli Collier RN  Outcome: Ongoing, Progressing  3/2/2022 0317 by Araceli Collier RN  Outcome: Ongoing, Progressing  Intervention: Maintain Hypertension-Management Strategies  Recent Flowsheet Documentation  Taken  3/2/2022 0022 by Araceli Collier RN  Medication Review/Management: medications reviewed  Taken 3/1/2022 2159 by Araceli Collier RN  Medication Review/Management: medications reviewed     Problem: Arrhythmia/Dysrhythmia  Goal: Normalized Cardiac Rhythm  3/2/2022 0416 by Araceli Collier RN  Outcome: Ongoing, Progressing  3/2/2022 0317 by Araceli Collier RN  Outcome: Ongoing, Progressing  Intervention: Monitor and Manage Cardiac Rhythm Effects  Recent Flowsheet Documentation  Taken 3/2/2022 0022 by Araceli Collier RN  VTE Prevention/Management:   sequential compression devices off   patient refused intervention  Taken 3/1/2022 2159 by Araceli Collier RN  VTE Prevention/Management:   bilateral   dorsiflexion/plantar flexion performed   sequential compression devices off   patient refused intervention   Goal Outcome Evaluation:  Plan of Care Reviewed With: patient        Progress: improving  Outcome Summary: no c.o pain. PO antibotics. no BM during shift. HR as low as 30s, asymptomatic. EKG performed and MD notified. resting well throughout shift.

## 2022-03-03 ENCOUNTER — HOME CARE VISIT (OUTPATIENT)
Dept: HOME HEALTH SERVICES | Facility: HOME HEALTHCARE | Age: 87
End: 2022-03-03

## 2022-03-03 ENCOUNTER — TRANSITIONAL CARE MANAGEMENT TELEPHONE ENCOUNTER (OUTPATIENT)
Dept: CALL CENTER | Facility: HOSPITAL | Age: 87
End: 2022-03-03

## 2022-03-03 PROCEDURE — G0299 HHS/HOSPICE OF RN EA 15 MIN: HCPCS

## 2022-03-03 NOTE — OUTREACH NOTE
Call Center TCM Note      Responses   [facility] patient discharged from? [place]   Does the patient have one of the following disease processes/diagnoses(primary or secondary)? Other   TCM attempt successful? Yes   Call start time 1140   Call end time 1143   Discharge diagnosis HTN, GERD, C DIFF   Meds reviewed with patient/caregiver? Yes   Is the patient having any side effects they believe may be caused by any medication additions or changes? No   Does the patient have all medications ordered at discharge? No   What is keeping the patient from filling the prescriptions? --  [still needs to  one RX, will be ready today]   Is the patient taking all medications as directed (includes completed medication regime)? Yes   Does the patient have a primary care provider?  Yes   Does the patient have an appointment with their PCP within 7 days of discharge? Yes   Comments regarding PCP Hospital d/c f/u appt on 3/7/22 @2:15pm   Has the patient kept scheduled appointments due by today? N/A   What is the Home health agency?  [facility]   Has home health visited the patient within 72 hours of discharge? Yes   Psychosocial issues? No   Did the patient receive a copy of their discharge instructions? Yes   Nursing interventions Reviewed instructions with patient   What is the patient's perception of their health status since discharge? Same   Is the patient/caregiver able to teach back signs and symptoms related to disease process for when to call PCP? Yes   Is the patient/caregiver able to teach back signs and symptoms related to disease process for when to call 911? Yes   Is the patient/caregiver able to teach back the hierarchy of who to call/visit for symptoms/problems? PCP, Specialist, Home health nurse, Urgent Care, ED, 911 Yes   If the patient is a current smoker, are they able to teach back resources for cessation? Not a smoker   TCM call completed? Yes          BIRDIE GOODWIN RN    3/3/2022, 11:43 EST

## 2022-03-03 NOTE — HOME HEALTH
SOC 3/3  DIAG: CDIFF, CKD3, HTN, GERD, AFIB, DM.  Patient is an 86 y.o.male lives in his home with wife. He has hx of  mechanical aortic valve on warfarin, PAF, CKD3 with prior nephrectomy, DM2, HTN, HLD, GERD and J LUIS . He went to   ER with c/o diarrhea that had been going on for some time. Started around 1/23/21 and he had been to pcp, er, several times  He has had poor oral intake with some intermittent nausea and vomiting (also non-bloody).  Patient was placed on tapering course of vancomycin for C. difficile colitis. He was up walking with walker, Wife normally works during the day and he is home alone. Patient spends most of his time sitting in chair in lving room. Rev meds in home no issues , has all meds except for his polycarbophil and lactobacillis, wife manages meds, states pharmacy out they are to order.

## 2022-03-03 NOTE — PAYOR COMM NOTE
"Laura Xie BRENNAN (86 y.o. Male)     ATTN: CONTINUED STAY CLINICALS AND DISCHARGE SUMMARY: [id number]    PLEASE REPLY WITH APPROVED DAYS: [phone],  [phone]              Date of Birth Social Security Number Address Home Phone MRN    1935  [address] [address]  [id number]    Mandaeism Marital Status             Hinduism        Admission Date Admission Type Admitting Provider Attending Provider Department, Room/Bed    2/14/22 Emergency Renato Quinonez MD  [address], [address]/1    Discharge Date Discharge Disposition Discharge Destination          3/2/20[place] [facility] [place]ending Provider: (none)   Allergies: Other, Penicillins, Percocet [Oxycodone-acetaminophen]    Isolation: None   Infection: C.difficile (02/15/22)   Code Status: Prior   Advance Care Planning Activity    Ht: 182.9 cm (72.01\")   Wt: 62.7 kg (138 lb 4.8 oz)    Admission Cmt: None   Principal Problem: C. difficile colitis [A04.72]                 Active Insurance as of 2/14/2022     Primary Coverage     Payor Plan Insurance Group Employer/Plan Group    ANTHEM BLUE CROSS ANTHEM BLUE CROSS BLUE SHIELD PPO [id number]     Payor Plan Address Payor Plan Phone Number Payor Plan Fax Number Effective Dates    [address] [phone]  1/28/2022 - None Entered    [facility] [id number]       Subscriber Name Subscriber Birth Date Member ID       XIE,PATRICIA M 7/11/1955 [id number]           Secondary Coverage     Payor Plan Insurance Group Employer/Plan Group    MEDICARE MEDICARE A & B      Payor Plan Address Payor Plan Phone Number Payor Plan Fax Number Effective Dates    [address] [phone]  11/1/2002 - None Entered    [facility] [id number]       Subscriber Name Subscriber Birth Date Member ID       LAURA XIE 1935 [id number]                 Emergency Contacts      (Rel.) Home Phone Work Phone Mobile Phone    Patricia Xie (Spouse) " [phone] -- [phone]    Bruce Silva (Son) [phone] -- [phone]            Vital Signs (last 2 days) before discharge     Date/Time Temp Temp src Pulse Resp BP Patient Position SpO2    03/02/22 1314 97.5 (36.4) Oral 79 16 126/66 Sitting --    03/02/22 0732 -- -- 89 16 101/68 Sitting 94    03/02/22 0230 -- -- 52 -- -- -- 100    03/02/22 0223 -- -- 34 -- -- -- 98    03/01/22 2300 97.8 (36.6) Oral 60 16 135/58 Lying 96    03/01/22 1952 97.7 (36.5) Oral 64 18 131/61 Lying 96    03/01/22 1300 96.9 (36.1) Oral 65 18 121/67 Lying 97    03/01/22 0700 97.2 (36.2) Oral 73 18 123/54 Sitting 98         Medication Administration Report for Francisco Sequeira as of 03/03/22 1630   Legend:    Given Hold Not Given Due Canceled Entry Other Actions    Time Time (Time) Time  Time-Action       Discontinued     Completed     Future     MAR Hold     Linked           Medications 02/28/22 03/01/22 03/02/22   Discontinued Medications  Medications 02/28/22 03/01/22 03/02/22          0858-Given             0849-Given             0907-Given             atorvastatin (LIPITOR) tablet 40 mg  Dose: 40 mg  Freq: Daily Route: PO  Start: 02/15/22 1100   End: 03/02/22 1713   Admin Instructions:   Avoid grapefruit juice.       0858-Given             0849-Given             0907-Given             colestipol (COLESTID) tablet 1 g  Dose: 1 g  Freq: 3 Times Daily Before Meals Route: PO  Start: 03/01/22 1730   End: 03/02/22 1713   Admin Instructions:   Swallow whole.  Do not crush, split, or chew tablet.  Avoid other meds at least 1 hour before or 4 hours after dose.   Order specific questions:   Drug Name: Colestid tablets          1758-Given             0906-Given     1237-Given            colestipol (COLESTID) tablet 1 g  Dose: 1 g  Freq: 2 Times Daily Route: PO  Start: 02/28/22 1400   End: 03/01/22 1256   Admin Instructions:   Swallow whole.  Do not crush, split, or chew tablet.  Avoid other meds at least 1 hour before or 4 hours  after dose.   Order specific questions:   Drug Name: Colestid tablets         1541-Given             0848-Given              colestipol (COLESTID) tablet 1 g  Dose: 1 g  Freq: Daily Route: PO  Start: 02/27/22 0900   End: 02/28/22 1107   Admin Instructions:   Swallow whole.  Do not crush, split, or chew tablet.  Avoid other meds at least 1 hour before or 4 hours after dose.   Order specific questions:   Drug Name: Colestid tablets         0858-Given               digoxin (LANOXIN) tablet 125 mcg  Dose: 125 mcg  Freq: Daily Route: PO  Start: 02/15/22 1100   End: 03/02/22 1713   Admin Instructions:    Check and record heart rate.       0858-Given             0848-Given             0908-Given             famotidine (PEPCID) tablet 20 mg  Dose: 20 mg  Freq: 2 Times Daily Before Meals Route: PO  Start: 02/15/22 1730   End: 03/02/22 1713       0611-Given     1710-Given            0545-Given     1756-Given            0630-Given             ferrous sulfate tablet 325 mg  Dose: 325 mg  Freq: Every Other Day Route: PO  Start: 02/15/22 1130   End: 03/02/22 1713   Admin Instructions:   Swallow whole. Do not crush, split, or chew. Take with food if GI upset occurs.        0848-Given              furosemide (LASIX) tablet 40 mg  Dose: 40 mg  Freq: Daily Route: PO  Start: 02/22/22 1815   End: 03/02/22 1713       0859-Given             0849-Given             0908-Given             insulin lispro (ADMELOG) injection 0-7 Units  Dose: 0-7 Units  Freq: 3 Times Daily Before Meals Route: SC  Start: 02/15/22 0730   End: 03/02/22 1713   Admin Instructions:   Correction - Low Dose.  Less than 40 units/day total insulin dose or lean, elderly, renal patients    Blood glucose 150-199 mg/dL - 2 units  Blood glucose 200-249 mg/dL - 3 units  Blood glucose 250-299 mg/dL - 4 units  Blood glucose 300-349 mg/dL - 5 units  Blood glucose 350-400 mg/dL - 6 units  Blood glucose greater than 400 mg/dL - 7 units and call provider     Caution: Look  alike/sound alike drug alert         (0745)-Not Given     1237-Given     1709-Given           (0656)-Not Given     1231-Given     1756-Given           0906-Given     1237-Given            lactobacillus acidophilus (RISAQUAD) capsule 1 capsule  Dose: 1 capsule  Freq: Daily Route: PO  Start: 02/23/22 1530   End: 03/02/22 1713       0859-Given             0849-Given             0908-Given             metoprolol succinate XL (TOPROL-XL) 24 hr tablet 12.5 mg  Dose: 12.5 mg  Freq: Daily Route: PO  Start: 02/19/22 0600   End: 03/02/22 1713   Admin Instructions:   Hold for SBP < 100 or HR < 60  Do not crush or chew.       0859-Given             0849-Given             0907-Given             polycarbophil tablet 1,250 mg  Dose: 1,250 mg  Freq: 2 Times Daily With Meals Route: PO  Start: 02/22/22 1800   End: 03/02/22 1713       0858-Given     1710-Given            0848-Given     1756-Given            0908-Given             saccharomyces boulardii (FLORASTOR) capsule 250 mg  Dose: 250 mg  Freq: 2 Times Daily Route: PO  Start: 02/20/22 2100   End: 03/02/22 1713       0859-Given     2132-Given            0849-Given     2155-Given            0908-Given             sodium bicarbonate tablet 1,300 mg  Dose: 1,300 mg  Freq: 3 Times Daily Route: PO  Start: 02/17/22 1115   End: 03/02/22 1713       0859-Given     1541-Given     2132-Given           0849-Given     1757-Given     2155-Given           0908-Given             sodium chloride 0.9 % flush 10 mL  Dose: 10 mL  Freq: Every 12 Hours Scheduled Route: IV  Start: 02/15/22 0230   End: 03/02/22 1713       0913-Given     2132-Given            1232-Given     2212-Given            0908-Given             vancomycin (VANCOCIN) capsule 125 mg  Dose: 125 mg  Freq: Every 6 Hours Scheduled Route: PO  Indications of Use: CLOSTRIDIOIDES DIFFICILE COLITIS  Start: 02/23/22 1800   End: 03/02/22 1713       0611-Given     1238-Given     1709-Given       2357-Given             0545-Given      1231-Given     1756-Given           0022-Given     0630-Given     1238-Given           warfarin (COUMADIN) tablet 5 mg  Dose: 5 mg  Freq: Once per day on Sun Tue Thu Sat Route: PO  Indications of Use: ATRIAL FIBRILLATION,PRESENCE OF MECHANICAL PROSTHETIC HEART VALVE  Indications Comment: 1/2020 hospitalization for CVA  Start: 02/15/22 1800   End: 03/02/22 1713   Admin Instructions:   Food-Drug Interaction  If INR Greater Than Target, Contact Pharmacy Prior to Giving Dose.    Acutely Hazardous. Waste BOTH Residual Medication and/or Empty Package. .   Order specific questions:   Target INR 3 - 3.5          1756-Given              warfarin (COUMADIN) tablet 7.5 mg  Dose: 7.5 mg  Freq: Once per day on Mon Wed Fri Route: PO  Indications of Use: ATRIAL FIBRILLATION,PRESENCE OF MECHANICAL PROSTHETIC HEART VALVE  Indications Comment: 1/2020 hospitalization for CVA  Start: 02/18/22 1800   End: 03/02/22 1713   Admin Instructions:   Food-Drug Interaction  If INR Greater Than Target, Contact Pharmacy Prior to Giving Dose.      Acutely Hazardous. Waste BOTH Residual Medication and/or Empty Package. .   Order specific questions:   Target INR 3 - 3.5         1710-Given                       Orders (last 72 hrs)      Start     Ordered    03/19/22 0900  vancomycin (VANCOCIN) capsule 125 mg  Every Other Day,   Status:  Discontinued         02/23/22 1429    03/19/22 0000  vancomycin (VANCOCIN) 125 MG capsule  Every Other Day         03/02/22 1214    03/12/22 0900  vancomycin (VANCOCIN) capsule 125 mg  Every 24 Hours,   Status:  Discontinued         02/23/22 1429    03/12/22 0000  vancomycin (VANCOCIN) 125 MG capsule  Every 24 Hours         03/02/22 1214    03/05/22 0900  vancomycin (VANCOCIN) capsule 125 mg  Every 12 Hours,   Status:  Discontinued         02/23/22 1429    03/05/22 0000  vancomycin (VANCOCIN) 125 MG capsule  Every 12 Hours         03/02/22 1214    03/03/22 0430  Renal Function Panel  Morning Draw,   Status:  Canceled          03/02/22 0840    03/03/22 0430  Uric Acid  Morning Draw,   Status:  Canceled         03/02/22 0840    03/03/22 0430  Magnesium  Morning Draw,   Status:  Canceled         03/02/22 0840    03/03/22 0000  lactobacillus acidophilus (RISAQUAD) capsule capsule  Daily         03/02/22 1214    03/03/22 0000  metoprolol succinate XL (TOPROL-XL) 25 MG 24 hr tablet  Daily         03/02/22 1214    03/02/22 1210  Discharge patient  Once         03/02/22 1214    03/02/22 1038  POC Glucose Once  PROCEDURE ONCE         03/02/22 1036    03/02/22 0645  POC Glucose Once  PROCEDURE ONCE         03/02/22 0643    03/02/22 0430  CBC & Differential  Morning Draw         03/01/22 0904    03/02/22 0430  Renal Function Panel  Morning Draw,   Status:  Canceled         03/01/22 0904    03/02/22 0430  Uric Acid  Morning Draw         03/01/22 0904    03/02/22 0430  Magnesium  Morning Draw         03/01/22 0904    03/02/22 0430  CBC Auto Differential  PROCEDURE ONCE         03/01/22 2030 03/02/22 0242  ECG 12 Lead  STAT         03/02/22 0242    03/02/22 0000  sodium bicarbonate 650 MG tablet  3 Times Daily         03/02/22 1214    03/02/22 0000  saccharomyces boulardii (FLORASTOR) 250 MG capsule  2 Times Daily         03/02/22 1214    03/02/22 0000  polycarbophil 625 MG tablet tablet  2 Times Daily With Meals         03/02/22 1214    03/02/22 0000  vancomycin (VANCOCIN) 125 MG capsule  Every 6 Hours Scheduled         03/02/22 1214    03/02/22 0000  famotidine (PEPCID) 20 MG tablet  2 Times Daily Before Meals         03/02/22 1214    03/02/22 0000  Ambulatory Referral to Home Health (Hospital)         03/02/22 1214    03/01/22 2157  POC Glucose Once  PROCEDURE ONCE         03/01/22 2155    03/01/22 1730  colestipol (COLESTID) tablet 1 g  3 Times Daily Before Meals,   Status:  Discontinued         03/01/22 1256    03/01/22 1608  POC Glucose Once  PROCEDURE ONCE         03/01/22 1605    03/01/22 1108  POC Glucose Once  PROCEDURE ONCE          03/01/22 1106    03/01/22 0629  POC Glucose Once  PROCEDURE ONCE         03/01/22 0625    03/01/22 0600  CBC Auto Differential  PROCEDURE ONCE        Comments: Discontinue After Heparin Stopped      02/28/22 2202    03/01/22 0430  Renal Function Panel  Morning Draw,   Status:  Canceled         02/28/22 0927    03/01/22 0430  Uric Acid  Morning Draw         02/28/22 0927    03/01/22 0430  Magnesium  Morning Draw         02/28/22 0927    02/28/22 2033  POC Glucose Once  PROCEDURE ONCE         02/28/22 2030 02/28/22 1635  POC Glucose Once  PROCEDURE ONCE         02/28/22 1632    02/28/22 1400  colestipol (COLESTID) tablet 1 g  2 Times Daily,   Status:  Discontinued         02/28/22 1107    02/23/22 1800  vancomycin (VANCOCIN) capsule 125 mg  Every 6 Hours Scheduled,   Status:  Discontinued         02/23/22 1429    02/23/22 1530  lactobacillus acidophilus (RISAQUAD) capsule 1 capsule  Daily,   Status:  Discontinued         02/23/22 1430    02/22/22 1815  furosemide (LASIX) tablet 40 mg  Daily,   Status:  Discontinued         02/22/22 1717    02/22/22 1800  polycarbophil tablet 1,250 mg  2 Times Daily With Meals,   Status:  Discontinued         02/22/22 1235    02/22/22 1718  Daily Weights  Daily,   Status:  Canceled      Comments: Standing scale only.    02/22/22 1717    02/22/22 1200  Dietary Nutrition Supplements Novasource Renal (Nepro)  Daily With Breakfast, Lunch & Dinner,   Status:  Canceled      Comments: Likes vanilla and strawberry.    02/22/22 0957    02/20/22 2100  saccharomyces boulardii (FLORASTOR) capsule 250 mg  2 Times Daily,   Status:  Discontinued         02/20/22 1422    02/20/22 0954  Renal Function Panel  Daily,   Status:  Canceled       02/20/22 0953    02/19/22 0600  metoprolol succinate XL (TOPROL-XL) 24 hr tablet 12.5 mg  Daily,   Status:  Discontinued         02/18/22 1403    02/18/22 1800  warfarin (COUMADIN) tablet 7.5 mg  Once per day on Mon Wed Fri,   Status:  Discontinued          02/16/22 1317    02/17/22 1115  sodium bicarbonate tablet 1,300 mg  3 Times Daily,   Status:  Discontinued         02/17/22 1023    02/17/22 0430  aPTT  Daily,   Status:  Canceled      Comments: Cancel If Patient Has Infusion Changes      02/16/22 1152    02/17/22 0430  CBC & Differential  Daily,   Status:  Canceled      Comments: Discontinue After Heparin Stopped      02/16/22 1152    02/16/22 1151  aPTT  As Needed,   Status:  Canceled       02/16/22 1152    02/16/22 1151  RN To Release aPTT Order 6 Hours After Heparin Bolus & 6 Hours After Any Heparin Rate Change  As Needed,   Status:  Canceled       02/16/22 1152    02/15/22 1800  warfarin (COUMADIN) tablet 5 mg  Once per day on Sun Tue Thu Sat,   Status:  Discontinued        Note to Pharmacy: Takes 7.5 mg on Monday, Wednesday and Friday    02/15/22 1315    02/15/22 1730  famotidine (PEPCID) tablet 20 mg  2 Times Daily Before Meals,   Status:  Discontinued         02/15/22 1324    02/15/22 1301  Pharmacy to dose warfarin  Continuous PRN,   Status:  Discontinued         02/15/22 1302    02/15/22 1130  ferrous sulfate tablet 325 mg  Every Other Day,   Status:  Discontinued         02/15/22 1013    02/15/22 1100  aspirin EC tablet 81 mg  Daily,   Status:  Discontinued         02/15/22 1013    02/15/22 1100  atorvastatin (LIPITOR) tablet 40 mg  Daily,   Status:  Discontinued         02/15/22 1013    02/15/22 1100  digoxin (LANOXIN) tablet 125 mcg  Daily,   Status:  Discontinued         02/15/22 1013    02/15/22 1014  Protime-INR  Daily,   Status:  Canceled       02/15/22 1013    02/15/22 1007  diphenhydrAMINE (BENADRYL) capsule 25 mg  2 Times Daily PRN,   Status:  Discontinued         02/15/22 1013    02/15/22 0730  insulin lispro (ADMELOG) injection 0-7 Units  3 Times Daily Before Meals,   Status:  Discontinued         02/15/22 0228    02/15/22 0700  POC Glucose TID AC  3 Times Daily Before Meals,   Status:  Canceled       02/15/22 0228    02/15/22 0400  Vital Signs  " Every 4 Hours,   Status:  Canceled       02/15/22 0228    02/15/22 0230  sodium chloride 0.9 % flush 10 mL  Every 12 Hours Scheduled,   Status:  Discontinued         02/15/22 0228    02/15/22 0228  glucagon (human recombinant) (GLUCAGEN DIAGNOSTIC) injection 1 mg  As Needed,   Status:  Discontinued         02/15/22 0228    02/15/22 0228  dextrose (GLUTOSE) oral gel 15 g  Every 15 Minutes PRN,   Status:  Discontinued         02/15/22 0228    02/15/22 0228  dextrose (D50W) (25 g/50 mL) IV injection 25 g  Every 15 Minutes PRN,   Status:  Discontinued         02/15/22 0228    02/15/22 0228  Intake & Output  Every Shift,   Status:  Canceled       02/15/22 0228    02/15/22 0227  calcium carbonate (TUMS) chewable tablet 500 mg (200 mg elemental)  2 Times Daily PRN,   Status:  Discontinued         02/15/22 0228    02/15/22 0227  ondansetron (ZOFRAN) tablet 4 mg  Every 6 Hours PRN,   Status:  Discontinued        \"Or\" Linked Group Details    02/15/22 0228    02/15/22 0227  ondansetron (ZOFRAN) injection 4 mg  Every 6 Hours PRN,   Status:  Discontinued        \"Or\" Linked Group Details    02/15/22 0228    02/15/22 0227  acetaminophen (TYLENOL) tablet 650 mg  Every 4 Hours PRN,   Status:  Discontinued        \"Or\" Linked Group Details    02/15/22 0228    02/15/22 0227  acetaminophen (TYLENOL) 160 MG/5ML solution 650 mg  Every 4 Hours PRN,   Status:  Discontinued        \"Or\" Linked Group Details    02/15/22 0228    02/15/22 0227  acetaminophen (TYLENOL) suppository 650 mg  Every 4 Hours PRN,   Status:  Discontinued        \"Or\" Linked Group Details    02/15/22 0228    02/15/22 0227  sodium chloride 0.9 % flush 10 mL  As Needed,   Status:  Discontinued         02/15/22 0228    02/15/22 0103  Pharmacy Consult  Continuous PRN,   Status:  Discontinued         02/15/22 0106    02/14/22 1908  sodium chloride 0.9 % flush 10 mL  As Needed,   Status:  Discontinued         02/14/22 1908    --  SCANNED - TELEMETRY           02/14/22 0000    " "               Physician Progress Notes (last 72 hours)        Leobardo Juarez MD at 22 1026          DAILY PROGRESS NOTE  [facility]    Patient Identification:  Name: Francisco Sequeira  Age: 86 y.o.  Sex: male  :  1935  MRN: [id number]         Primary Care Physician: Rubin Hinkle APRN    Subjective:  Interval History:Diarrhea is better.  He is still very weak.  Still incontinence of stool.    Objective:    Vital signs in last 24 hours:  Temp:  [96.7 °F (35.9 °C)-98.2 °F (36.8 °C)] 97.2 °F (36.2 °C)  Heart Rate:  [58-73] 73  Resp:  [16-18] 18  BP: (116-141)/(54-72) 123/54    Intake/Output:    Intake/Output Summary (Last 24 hours) at 3/1/2022 1026  Last data filed at 3/1/2022 0002  Gross per 24 hour   Intake 320 ml   Output --   Net 320 ml       Exam:  /54 (BP Location: Left arm, Patient Position: Sitting)   Pulse 73   Temp 97.2 °F (36.2 °C) (Oral)   Resp 18   Ht 182.9 cm (72.01\")   Wt 64.6 kg (142 lb 6.4 oz)   SpO2 98%   BMI 19.31 kg/m²     General Appearance:    Alert, cooperative, no distress   Head:    Normocephalic, without obvious abnormality, atraumatic   Eyes:       Throat:   Lips, tongue, gums normal   Neck:   Supple, symmetrical, trachea midline, no JVD   Lungs:     Clear to auscultation bilaterally, respirations unlabored   Chest Wall:    No tenderness or deformity    Heart:    Regular rate and rhythm, S1 and S2 normal, no murmur,no  Rub or gallop   Abdomen:     Soft, nontender, bowel sounds active, no masses, no organomegaly    Extremities:   Extremities normal, atraumatic, no cyanosis or edema   Pulses:      Skin:   Skin is warm and dry,  no rashes or palpable lesions   Neurologic:   no focal deficits noted        Assessment:  Active Hospital Problems    Diagnosis  POA   • **C. difficile colitis [A04.72]  Yes   • History of nephrectomy [Z90.5]  Not Applicable   • Iron deficiency anemia [D50.9]  Yes   • Chronic anticoagulation [Z79.01]  Not Applicable   • BYRON " (acute kidney injury) (HCC) [N17.9]  Yes   • Stage 3b chronic kidney disease (HCC) [N18.32]  Yes   • Hx of aortic valve replacement, mechanical [Z95.2] [Z95.2]  Not Applicable   • Hyperlipidemia [E78.5]  Yes   • Gastroesophageal reflux disease [K21.9]  Yes   • Type 2 diabetes mellitus (HCC) [E11.9]  Yes   • Hypertension [I10]  Yes   • Atrial fibrillation (HCC) [I48.91]  Yes      Resolved Hospital Problems   No resolved problems to display.       Plan:  Continue with current RX as per multiple consults.Follow lab.  DC planning.    Leobardo Juarez MD  3/1/2022  10:26 EST    Electronically signed by Leobardo Juarez MD at 22 1027     Jeff Dominguez MD at 22 0903              [facility] Progress Note      Patient Name: Francisco Sequeira  : 1935  MRN: [id number]  Primary Care Physician:  Rubin Hinkle APRN  Date of admission: 2022    Subjective     Interval History:   F/u BYRON on  CKD3  The patient is feeling the same, appetite is reasonably good today, no chest pain or shortness of air, no orthopnea or PND, no nausea or vomiting, no abdominal pain, continue to have recurrent diarrhea and fecal incontinence    Review of Systems:   As noted above    Objective     Vitals:   Temp:  [96.7 °F (35.9 °C)-98.2 °F (36.8 °C)] 97.2 °F (36.2 °C)  Heart Rate:  [58-73] 73  Resp:  [16-18] 18  BP: (116-141)/(54-72) 123/54    Intake/Output Summary (Last 24 hours) at 3/1/2022 0903  Last data filed at 3/1/2022 0002  Gross per 24 hour   Intake 320 ml   Output --   Net 320 ml       Physical Exam:    General Appearance: alert, oriented x 3, no acute distress,, appears to be chronically ill  Skin: warm and dry  HEENT: pupils round and reactive to light, oral mucosa normal,, temporal wasting nonicteric sclera  Neck: supple, no JVD, trachea midline  Lungs: CTA, unlabored breathing effort  Heart: Irregularly irregular, no rub  Abdomen: soft, nontender, normoactive bowels  : no palpable  bladder,  Extremities: no edema, cyanosis or clubbing  Neuro: normal speech and mental status        Results Reviewed:   I have personally reviewed the results from the time of this admission to 3/1/2022 09:03 EST       Assessment / Plan     ASSESSMENT:  1. BYRON, CKD 3.  BYRON resolved, Cr stable 1.1 - 1.2 range (BL Cr 1.2 - 1.4).  Creatinine today 1.23, electrolyte within acceptable range  2. C. Dif colitis, he had recurrent diarrhea followed by GI, treated with oral vancomycin  3. Chronic diastolic heart failure - compensated on oral Lasix  4, Afib, controlled rate  5. SP AVR, mechanical.  6. DM2, with renal complication  7.  Sarcopenia and protein calorie malnutrition Albumin 2.6, on protein supplement    PLAN:  1.  Continue the same treatment and continue protein supplement  2.  Surveillance labs      Jeff Dominguez MD  03/01/22  09:03 EST    [facility]  [phone]    Electronically signed by Jeff Dominguez MD at 03/01/22 0903     Jenn Ronquillo APRN at 03/01/22 0836          Gastroenterology   Inpatient Progress Note    Reason for Follow Up:  C. difficile diarrhea    Subjective  Interval History:   Colestipol was increased to 1 tablet twice daily yesterday.  This helped with frequency of bowel movements but is still having incontinence.  Bowel movements are soft.  He does not have the urge or sensation prior to having a bowel movement.    He is able to swallow colestipol with the assistance of applesauce.    He continues on antibiotics for C. difficile.      Review of Systems:               Gastroenterology positive for fecal incontinence    Objective     Vital Signs  Temp:  [96.7 °F (35.9 °C)-98.2 °F (36.8 °C)] 97.2 °F (36.2 °C)  Heart Rate:  [58-73] 73  Resp:  [16-18] 18  BP: (116-141)/(54-72) 123/54  Body mass index is 19.31 kg/m².                  Physical Exam:              General: patient awake, alert and cooperative              Eyes: no scleral icterus               Skin: warm and dry, not jaundiced              Abdomen: soft, nontender, nondistended; normal bowel sounds              Psychiatric: Appropriate affect and behavior                Results Review:                I reviewed the patient's new clinical results.      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 difficile colitis       Assessment:  1. C. difficile colitis  2. Diarrhea and fecal incontinence secondary to #1  3. 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 History of gastritis/duodenitis October 2021  5. Atrial fibrillation  6. History of mechanical heart valve on Coumadin        Plan:  · C. difficile colitis, complete course of vancomycin as prescribed  · Fecal incontinence, increased colestipol to twice daily yesterday, continued fecal incontinence but less frequent bowel movements, will increase colestipol to 1 tablet 3 times daily, if constipation occurs, hold next dose of colestipol and resume once constipation improves.  Would like to help bulk up stools.  We'll also continue fiber tablet.  · Continue famotidine  · Diet as  tolerated    I discussed the patients findings and my recommendations with patient and nursing staff.           Jenn DE JESUS  [facility], [place], operated by [facility] [place] [address]  Office: [phone]      Electronically signed by Jenn Ronquillo APRN at 22 1256              Discharge Summary      Leobardo Juarez MD at 22 1215                                                                             PHYSICIAN DISCHARGE SUMMARY                                                                        [facility]    Patient Identification:  Name: Francisco Sequeira  Age: 86 y.o.  Sex: male  :  1935  MRN: [id number]  Primary Care Physician: Rubin Hinkle APRN    Admit date: 2022  Discharge date and time:3/2/2022  Discharged Condition: good    Discharge Diagnoses:  Active Hospital Problems    Diagnosis  POA   • **C. 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          PMHX:   Past Medical History:   Diagnosis Date   • Allergic rhinitis    • Aortic valve insufficiency    • Ascending aortic aneurysm (HCC)    • Atrial fibrillation (HCC)    • Bacteremia    • Calcific aortic stenosis of bicuspid valve    • Cardiac arrest (HCC)    • Cardiomyopathy (HCC)    • CKD (chronic kidney disease) stage 3, GFR 30-59 ml/min (HCC)    • Contact dermatitis due to poison ivy    •  Elbow fracture    • Esophageal reflux    • GERD (gastroesophageal reflux disease)    • Head injury    • History of transfusion    • Hyperglycemia    • Hyperlipidemia    • Hypertension    • Kidney carcinoma (HCC)    • Nonischemic cardiomyopathy (HCC)    • Renal oncocytoma    • Seasonal allergic reaction    • Sinusitis    • Syncope    • Type 2 diabetes mellitus (HCC)     uncontrolled   • Visual field defect      PSHX:   Past Surgical History:   Procedure Laterality Date   • AORTIC VALVE REPAIR/REPLACEMENT     • ASCENDING AORTIC ANEURYSM REPAIR W/ MECHANICAL AORTIC VALVE REPLACEMENT     • COLONOSCOPY N/A 10/28/2021    Procedure: COLONOSCOPY to cecum:  cold snare polyps,;  Surgeon: Dinesh Meza MD;  Location: [facility] ENDOSCOPY;  Service: Gastroenterology;  Laterality: N/A;  pre:  Iron deficiency anemia  post:  polyps, diverticulosis,    • COLONOSCOPY N/A 11/9/2021    Procedure: COLONOSCOPY to cecum with APC cautery of AVM and clip placement x1;  Surgeon: Pavan Rodriguez MD;  Location: [facility] ENDOSCOPY;  Service: Gastroenterology;  Laterality: N/A;  PRE - gi bleed, anemia  POST - diverticulosis, poor prep, AVM right colon   • ENDOSCOPY N/A 10/28/2021    Procedure: ESOPHAGOGASTRODUODENOSCOPY with biopsies;  Surgeon: Dinesh Meza MD;  Location: [facility] ENDOSCOPY;  Service: Gastroenterology;  Laterality: N/A;  pre:  Iron deficiency anemia  post:  duodenitis and gastritis   • EYE SURGERY  12/09/2020    cataract surgery    • NEPHRECTOMY     • OTHER SURGICAL HISTORY      elbow surgery   • OTHER SURGICAL HISTORY      right arm surgery   • PROSTATE SURGERY     • THORACENTESIS Left     diagnostic       Hospital Course: Francisco Sequeira   is a 86 y.o. former smoker with a history of mechanical aortic valve on warfarin, PAF, CKD3 with prior nephrectomy, DM2, HTN, HLD, GERD and J LUIS that presents to [facility] complaining of diarrhea. The patient's wife provides most of the historian as the  "patient just had another \"blow out\" diarrhea spell at the time of this visit. Per her report, the patient started having issues with diarrhea dating back to 1/23/21. He apparently called his PCP office who initially told him to take imodium as needed. The diarrhea continued until that following Friday and the PCP office told him to go to the . He was seen there 1/28 where he was reportedly given erythromycin and told to let it run its course. The diarrhea still did not improve and so they went to the ED here on 1/31. A stool sample was apparently taken at that time but reportedly formed and PCR sent off. He was discharged home with no medications.                      The patient's wife states the diarrhea has been persistent and unrelenting since this time. The patient reports as innumerable amounts of diarrhea daily. The stool has been liquid/watery and non-bloody. He denies any associated abdominal pain, cramping or fever, but has had some subjective chills as well as weakness. He has had poor oral intake with some intermittent nausea and vomiting (also non-bloody). He denies any recent chest pain, dyspnea, cough or dysuria, but reports a 30 pound weight loss since admission here in October. He was here at that time with J LUIS and underwent colonoscopy with 7 polyps removed and EGD with gastritis and duodenal erosion. He returned here in November with recurrent anemia and had a tagged RBC scan with bleeding in the RLQ, distal ileum versus proximal colon. He had another colonoscopy with AVM that was treated as well.  He eventually returned home with his wife at discharge on his warfarin for mechanical valve.              In the ED, WBC were 20.13, hemoglobin 17, lipase 18, creatinine 3.31 and LFTs normal. GI PCR was negative and C.diff positive. COVID19 was negative. He was started on fluids and oral vancomycin and has been admitted for further care at this time.           The patient was admitted to the hospital " and seen by infectious disease, GI medicine, cardiology and nephrology.  Patient was placed on tapering course of vancomycin for C. difficile colitis.  After being in the hospital for several days he was doing better and diarrhea was controllable and his wife felt that she could manage it at home.  He is still pretty weak will have some home health services with PT.  He will follow-up with his primary care in 1 week.    Consults:     Consults     Date and Time Order Name Status Description    2/23/2022  1:27 PM Inpatient Infectious Diseases Consult Completed     2/20/2022 11:56 AM Inpatient Gastroenterology Consult      2/17/2022 12:19 PM Inpatient Cardiology Consult Completed     2/15/2022 10:06 AM Inpatient Gastroenterology Consult Completed     2/15/2022 10:05 AM Inpatient Nephrology Consult      2/15/2022  1:06 AM [facility] (on-call MD unless specified) Details          Results from last 7 days   Lab Units 03/02/22  0435   WBC 10*3/mm3 5.61   HEMOGLOBIN g/dL 12.0*   HEMATOCRIT % 38.2   PLATELETS 10*3/mm3 155     Results from last 7 days   Lab Units 03/02/22  0435   SODIUM mmol/L 142   POTASSIUM mmol/L 4.1   CHLORIDE mmol/L 105   CO2 mmol/L 28.9   BUN mg/dL 31*   CREATININE mg/dL 1.21   GLUCOSE mg/dL 167*   CALCIUM mg/dL 8.8     Significant Diagnostic Studies:   WBC   Date Value Ref Range Status   03/02/2022 5.61 3.40 - 10.80 10*3/mm3 Final     Hemoglobin   Date Value Ref Range Status   03/02/2022 12.0 (L) 13.0 - 17.7 g/dL Final     Hematocrit   Date Value Ref Range Status   03/02/2022 38.2 37.5 - 51.0 % Final     Platelets   Date Value Ref Range Status   03/02/2022 155 140 - 450 10*3/mm3 Final     Sodium   Date Value Ref Range Status   03/02/2022 142 136 - 145 mmol/L Final     Potassium   Date Value Ref Range Status   03/02/2022 4.1 3.5 - 5.2 mmol/L Final     Chloride   Date Value Ref Range Status   03/02/2022 105 98 - 107 mmol/L Final     CO2   Date Value Ref Range Status   03/02/2022 28.9 22.0 - 29.0 mmol/L Final      BUN   Date Value Ref Range Status   03/02/2022 31 (H) 8 - 23 mg/dL Final     Creatinine   Date Value Ref Range Status   03/02/2022 1.21 0.76 - 1.27 mg/dL Final     Glucose   Date Value Ref Range Status   03/02/2022 167 (H) 65 - 99 mg/dL Final     Calcium   Date Value Ref Range Status   03/02/2022 8.8 8.6 - 10.5 mg/dL Final     Magnesium   Date Value Ref Range Status   03/02/2022 2.4 1.6 - 2.4 mg/dL Final     Phosphorus   Date Value Ref Range Status   03/02/2022 2.8 2.5 - 4.5 mg/dL Final     No results found for: AST, ALT, ALKPHOS  INR   Date Value Ref Range Status   03/02/2022 2.80 (H) 0.90 - 1.10 Final     No results found for: COLORU, CLARITYU, SPECGRAV, PHUR, PROTEINUR, GLUCOSEU, KETONESU, BLOODU, NITRITE, LEUKOCYTESUR, BILIRUBINUR, UROBILINOGEN, RBCUA, WBCUA, BACTERIA, UACOMMENT  No results found for: TROPONINT, TROPONINI, BNP  No components found for: HGBA1C;2  No components found for: TSH;2  Imaging Results (All)     None        Lab Results (last 7 days)     Procedure Component Value Units Date/Time    POC Glucose Once [[id number]]  (Abnormal) Collected: 03/02/22 1036    Specimen: Blood Updated: 03/02/22 1037     Glucose 194 mg/dL      Comment: Meter: [id number] : [id number] Leon Reyes Clausia NA       POC Glucose Once [[id number]]  (Abnormal) Collected: 03/02/22 0643    Specimen: Blood Updated: 03/02/22 0644     Glucose 159 mg/dL      Comment: Meter: [id number] : [id number] Tacho ALEJO       Renal Function Panel [[id number]]  (Abnormal) Collected: 03/02/22 0435    Specimen: Blood Updated: 03/02/22 0541     Glucose 167 mg/dL      BUN 31 mg/dL      Creatinine 1.21 mg/dL      Sodium 142 mmol/L      Potassium 4.1 mmol/L      Chloride 105 mmol/L      CO2 28.9 mmol/L      Calcium 8.8 mg/dL      Albumin 2.60 g/dL      Phosphorus 2.8 mg/dL      Anion Gap 8.1 mmol/L      BUN/Creatinine Ratio 25.6     eGFR 59.0 mL/min/1.73      Comment: National Kidney Foundation and American Society of Nephrology (ASN)  Task Force recommended calculation based on the Chronic Kidney Disease Epidemiology Collaboration (CKD-EPI) equation refit without adjustment for race.       Narrative:      GFR Normal >60  Chronic Kidney Disease <60  Kidney Failure <15      Uric Acid [[id number]]  (Normal) Collected: 03/02/22 0435    Specimen: Blood Updated: 03/02/22 0540     Uric Acid 6.9 mg/dL     Magnesium [[id number]]  (Normal) Collected: 03/02/22 0435    Specimen: Blood Updated: 03/02/22 0540     Magnesium 2.4 mg/dL     aPTT [[id number]]  (Abnormal) Collected: 03/02/22 0435    Specimen: Blood Updated: 03/02/22 0517     PTT 47.4 seconds     Protime-INR [[id number]]  (Abnormal) Collected: 03/02/22 0435    Specimen: Blood Updated: 03/02/22 0517     Protime 29.7 Seconds      INR 2.80    CBC & Differential [[id number]]  (Abnormal) Collected: 03/02/22 0435    Specimen: Blood Updated: 03/02/22 0507    Narrative:      The following orders were created for panel order CBC & Differential.  Procedure                               Abnormality         Status                     ---------                               -----------         ------                     CBC Auto Differential[[id number]]        Abnormal            Final result                 Please view results for these tests on the individual orders.    CBC Auto Differential [[id number]]  (Abnormal) Collected: 03/02/22 0435    Specimen: Blood Updated: 03/02/22 0507     WBC 5.61 10*3/mm3      RBC 4.64 10*6/mm3      Hemoglobin 12.0 g/dL      Hematocrit 38.2 %      MCV 82.3 fL      MCH 25.9 pg      MCHC 31.4 g/dL      RDW 15.7 %      RDW-SD 46.2 fl      MPV 11.8 fL      Platelets 155 10*3/mm3      Neutrophil % 70.3 %      Lymphocyte % 17.6 %      Monocyte % 7.8 %      Eosinophil % 3.6 %      Basophil % 0.7 %      Immature Grans % 0.0 %      Neutrophils, Absolute 3.94 10*3/mm3      Lymphocytes, Absolute 0.99 10*3/mm3      Monocytes, Absolute 0.44 10*3/mm3      Eosinophils, Absolute 0.20 10*3/mm3       Basophils, Absolute 0.04 10*3/mm3      Immature Grans, Absolute 0.00 10*3/mm3      nRBC 0.0 /100 WBC     POC Glucose Once [[id number]]  (Abnormal) Collected: 03/01/22 2155    Specimen: Blood Updated: 03/01/22 2156     Glucose 213 mg/dL      Comment: Meter: [id number] : [id number] Tacho Boyce NA       POC Glucose Once [[id number]]  (Abnormal) Collected: 03/01/22 1605    Specimen: Blood Updated: 03/01/22 1607     Glucose 174 mg/dL      Comment: Meter: [id number] : [id number] Erasmo Roberts NA       POC Glucose Once [[id number]]  (Abnormal) Collected: 03/01/22 1106    Specimen: Blood Updated: 03/01/22 1107     Glucose 319 mg/dL      Comment: Meter: [id number] : [id number] Goodman Maliha ALEJO       Renal Function Panel [[id number]]  (Abnormal) Collected: 03/01/22 0537    Specimen: Blood Updated: 03/01/22 0744     Glucose 144 mg/dL      BUN 28 mg/dL      Creatinine 1.23 mg/dL      Sodium 139 mmol/L      Potassium 3.9 mmol/L      Chloride 100 mmol/L      CO2 31.0 mmol/L      Calcium 8.3 mg/dL      Albumin 2.60 g/dL      Phosphorus 2.6 mg/dL      Anion Gap 8.0 mmol/L      BUN/Creatinine Ratio 22.8     eGFR 57.9 mL/min/1.73      Comment: National Kidney Foundation and American Society of Nephrology (ASN) Task Force recommended calculation based on the Chronic Kidney Disease Epidemiology Collaboration (CKD-EPI) equation refit without adjustment for race.       Narrative:      GFR Normal >60  Chronic Kidney Disease <60  Kidney Failure <15      Uric Acid [[id number]]  (Abnormal) Collected: 03/01/22 0537    Specimen: Blood Updated: 03/01/22 0744     Uric Acid 7.2 mg/dL     Magnesium [[id number]]  (Normal) Collected: 03/01/22 0537    Specimen: Blood Updated: 03/01/22 0744     Magnesium 2.1 mg/dL     aPTT [[id number]]  (Abnormal) Collected: 03/01/22 0537    Specimen: Blood Updated: 03/01/22 0709     PTT 45.9 seconds     Protime-INR [[id number]]  (Abnormal) Collected: 03/01/22 0537    Specimen: Blood Updated: 03/01/22 0709      Protime 30.4 Seconds      INR 2.89    CBC & Differential [[id number]]  (Abnormal) Collected: 03/01/22 0537    Specimen: Blood Updated: 03/01/22 0655    Narrative:      The following orders were created for panel order CBC & Differential.  Procedure                               Abnormality         Status                     ---------                               -----------         ------                     CBC Auto Differential[[id number]]        Abnormal            Final result                 Please view results for these tests on the individual orders.    CBC Auto Differential [[id number]]  (Abnormal) Collected: 03/01/22 0537    Specimen: Blood Updated: 03/01/22 0655     WBC 6.08 10*3/mm3      RBC 4.61 10*6/mm3      Hemoglobin 11.7 g/dL      Hematocrit 38.1 %      MCV 82.6 fL      MCH 25.4 pg      MCHC 30.7 g/dL      RDW 15.6 %      RDW-SD 46.8 fl      MPV 12.0 fL      Platelets 162 10*3/mm3      Neutrophil % 72.8 %      Lymphocyte % 15.1 %      Monocyte % 7.9 %      Eosinophil % 3.1 %      Basophil % 0.8 %      Immature Grans % 0.3 %      Neutrophils, Absolute 4.42 10*3/mm3      Lymphocytes, Absolute 0.92 10*3/mm3      Monocytes, Absolute 0.48 10*3/mm3      Eosinophils, Absolute 0.19 10*3/mm3      Basophils, Absolute 0.05 10*3/mm3      Immature Grans, Absolute 0.02 10*3/mm3      nRBC 0.0 /100 WBC     POC Glucose Once [[id number]]  (Abnormal) Collected: 03/01/22 0625    Specimen: Blood Updated: 03/01/22 0628     Glucose 153 mg/dL      Comment: Meter: [id number] : [id number] [employer] PCA       POC Glucose Once [[id number]]  (Abnormal) Collected: 02/28/22 2030    Specimen: Blood Updated: 02/28/22 2032     Glucose 228 mg/dL      Comment: Meter: [id number] : [id number] [employer] PCA       POC Glucose Once [[id number]]  (Abnormal) Collected: 02/28/22 1632    Specimen: Blood Updated: 02/28/22 1634     Glucose 173 mg/dL      Comment: Meter: [id number] : [id number] Ricky Roxana NA       POC Glucose Once  [[id number]]  (Abnormal) Collected: 02/28/22 1141    Specimen: Blood Updated: 02/28/22 1147     Glucose 288 mg/dL      Comment: Meter: [id number] : [id number] Ricky ALEJO       Renal Function Panel [[id number]]  (Abnormal) Collected: 02/28/22 0531    Specimen: Blood Updated: 02/28/22 0705     Glucose 136 mg/dL      BUN 26 mg/dL      Creatinine 1.33 mg/dL      Sodium 142 mmol/L      Potassium 3.9 mmol/L      Chloride 104 mmol/L      CO2 28.8 mmol/L      Calcium 8.4 mg/dL      Albumin 2.20 g/dL      Phosphorus 2.4 mg/dL      Anion Gap 9.2 mmol/L      BUN/Creatinine Ratio 19.5     eGFR Non African Amer 51 mL/min/1.73     Narrative:      GFR Normal >60  Chronic Kidney Disease <60  Kidney Failure <15      CBC & Differential [[id number]]  (Abnormal) Collected: 02/28/22 0531    Specimen: Blood Updated: 02/28/22 0644    Narrative:      The following orders were created for panel order CBC & Differential.  Procedure                               Abnormality         Status                     ---------                               -----------         ------                     CBC Auto Differential[[id number]]        Abnormal            Final result                 Please view results for these tests on the individual orders.    CBC Auto Differential [[id number]]  (Abnormal) Collected: 02/28/22 0531    Specimen: Blood Updated: 02/28/22 0644     WBC 6.45 10*3/mm3      RBC 4.61 10*6/mm3      Hemoglobin 11.8 g/dL      Hematocrit 37.4 %      MCV 81.1 fL      MCH 25.6 pg      MCHC 31.6 g/dL      RDW 15.3 %      RDW-SD 44.5 fl      MPV 12.0 fL      Platelets 165 10*3/mm3      Neutrophil % 71.6 %      Lymphocyte % 17.1 %      Monocyte % 7.6 %      Eosinophil % 2.8 %      Basophil % 0.6 %      Immature Grans % 0.3 %      Neutrophils, Absolute 4.62 10*3/mm3      Lymphocytes, Absolute 1.10 10*3/mm3      Monocytes, Absolute 0.49 10*3/mm3      Eosinophils, Absolute 0.18 10*3/mm3      Basophils, Absolute 0.04 10*3/mm3      Immature  Grans, Absolute 0.02 10*3/mm3      nRBC 0.0 /100 WBC     aPTT [[id number]]  (Abnormal) Collected: 02/28/22 0531    Specimen: Blood Updated: 02/28/22 0638     PTT 52.7 seconds     Protime-INR [[id number]]  (Abnormal) Collected: 02/28/22 0531    Specimen: Blood Updated: 02/28/22 0638     Protime 32.2 Seconds      INR 3.11    POC Glucose Once [[id number]]  (Abnormal) Collected: 02/28/22 0629    Specimen: Blood Updated: 02/28/22 0631     Glucose 137 mg/dL      Comment: Meter: [id number] : [id number] Lucian Laura PCA       POC Glucose Once [[id number]]  (Abnormal) Collected: 02/27/22 2006    Specimen: Blood Updated: 02/27/22 2006     Glucose 194 mg/dL      Comment: Meter: [id number] : [id number] [employer] PCA       POC Glucose Once [[id number]]  (Abnormal) Collected: 02/27/22 1614    Specimen: Blood Updated: 02/27/22 1616     Glucose 210 mg/dL      Comment: Meter: [id number] : [id number] Goodman Maliha ALEJO       POC Glucose Once [[id number]]  (Abnormal) Collected: 02/27/22 1105    Specimen: Blood Updated: 02/27/22 1107     Glucose 269 mg/dL      Comment: RN Notified R and V Meter: [id number] : [id number] Mary ALEJO       POC Glucose Once [[id number]]  (Normal) Collected: 02/27/22 0656    Specimen: Blood Updated: 02/27/22 0658     Glucose 127 mg/dL      Comment: Meter: [id number] : [id number] Joslyn ALEJO       Renal Function Panel [[id number]]  (Abnormal) Collected: 02/27/22 0533    Specimen: Blood Updated: 02/27/22 0618     Glucose 129 mg/dL      BUN 19 mg/dL      Creatinine 1.17 mg/dL      Sodium 136 mmol/L      Potassium 3.8 mmol/L      Chloride 99 mmol/L      CO2 31.0 mmol/L      Calcium 8.2 mg/dL      Albumin 2.60 g/dL      Phosphorus 2.7 mg/dL      Anion Gap 6.0 mmol/L      BUN/Creatinine Ratio 16.2     eGFR Non African Amer 59 mL/min/1.73     Narrative:      GFR Normal >60  Chronic Kidney Disease <60  Kidney Failure <15      aPTT [[id number]]  (Abnormal) Collected: 02/27/22 0533    Specimen:  Blood Updated: 02/27/22 0611     PTT 54.9 seconds     Protime-INR [[id number]]  (Abnormal) Collected: 02/27/22 0533    Specimen: Blood Updated: 02/27/22 0611     Protime 35.2 Seconds      INR 3.48    CBC & Differential [[id number]]  (Abnormal) Collected: 02/27/22 0533    Specimen: Blood Updated: 02/27/22 0601    Narrative:      The following orders were created for panel order CBC & Differential.  Procedure                               Abnormality         Status                     ---------                               -----------         ------                     CBC Auto Differential[[id number]]        Abnormal            Final result                 Please view results for these tests on the individual orders.    CBC Auto Differential [[id number]]  (Abnormal) Collected: 02/27/22 0533    Specimen: Blood Updated: 02/27/22 0601     WBC 5.89 10*3/mm3      RBC 4.80 10*6/mm3      Hemoglobin 12.2 g/dL      Hematocrit 38.8 %      MCV 80.8 fL      MCH 25.4 pg      MCHC 31.4 g/dL      RDW 15.2 %      RDW-SD 44.3 fl      MPV 11.2 fL      Platelets 178 10*3/mm3      Neutrophil % 73.6 %      Lymphocyte % 15.3 %      Monocyte % 7.3 %      Eosinophil % 2.7 %      Basophil % 0.8 %      Immature Grans % 0.3 %      Neutrophils, Absolute 4.33 10*3/mm3      Lymphocytes, Absolute 0.90 10*3/mm3      Monocytes, Absolute 0.43 10*3/mm3      Eosinophils, Absolute 0.16 10*3/mm3      Basophils, Absolute 0.05 10*3/mm3      Immature Grans, Absolute 0.02 10*3/mm3      nRBC 0.0 /100 WBC     POC Glucose Once [[id number]]  (Abnormal) Collected: 02/26/22 2100    Specimen: Blood Updated: 02/26/22 2101     Glucose 141 mg/dL      Comment: Meter: [id number] : [id number] Joslyn Sprague NA       POC Glucose Once [[id number]]  (Abnormal) Collected: 02/26/22 1606    Specimen: Blood Updated: 02/26/22 1608     Glucose 200 mg/dL      Comment: RN Notified R and V Meter: [id number] : [id number] Mary ALEJO       POC Glucose Once [[id number]]  (Abnormal)  Collected: 02/26/22 1126    Specimen: Blood Updated: 02/26/22 1131     Glucose 181 mg/dL      Comment: Meter: [id number] : [id number] Edgar Contreras RN       POC Glucose Once [[id number]]  (Normal) Collected: 02/26/22 0626    Specimen: Blood Updated: 02/26/22 0628     Glucose 109 mg/dL      Comment: Meter: [id number] : [id number] Joslyn ALEJO       Renal Function Panel [[id number]]  (Abnormal) Collected: 02/26/22 0433    Specimen: Blood Updated: 02/26/22 0519     Glucose 118 mg/dL      BUN 17 mg/dL      Creatinine 1.16 mg/dL      Sodium 138 mmol/L      Potassium 3.7 mmol/L      Chloride 100 mmol/L      CO2 31.0 mmol/L      Calcium 8.2 mg/dL      Albumin 2.60 g/dL      Phosphorus 3.1 mg/dL      Anion Gap 7.0 mmol/L      BUN/Creatinine Ratio 14.7     eGFR Non African Amer 60 mL/min/1.73     Narrative:      GFR Normal >60  Chronic Kidney Disease <60  Kidney Failure <15      aPTT [[id number]]  (Abnormal) Collected: 02/26/22 0433    Specimen: Blood Updated: 02/26/22 0504     PTT 53.6 seconds     Protime-INR [[id number]]  (Abnormal) Collected: 02/26/22 0433    Specimen: Blood Updated: 02/26/22 0504     Protime 33.8 Seconds      INR 3.30    CBC & Differential [[id number]]  (Abnormal) Collected: 02/26/22 0433    Specimen: Blood Updated: 02/26/22 0455    Narrative:      The following orders were created for panel order CBC & Differential.  Procedure                               Abnormality         Status                     ---------                               -----------         ------                     CBC Auto Differential[[id number]]        Abnormal            Final result                 Please view results for these tests on the individual orders.    CBC Auto Differential [[id number]]  (Abnormal) Collected: 02/26/22 0433    Specimen: Blood Updated: 02/26/22 0455     WBC 6.13 10*3/mm3      RBC 4.69 10*6/mm3      Hemoglobin 12.2 g/dL      Hematocrit 38.4 %      MCV 81.9 fL      MCH 26.0 pg      MCHC 31.8 g/dL       RDW 15.2 %      RDW-SD 44.3 fl      MPV 11.8 fL      Platelets 177 10*3/mm3      Neutrophil % 73.5 %      Lymphocyte % 14.2 %      Monocyte % 8.2 %      Eosinophil % 2.9 %      Basophil % 1.0 %      Immature Grans % 0.2 %      Neutrophils, Absolute 4.51 10*3/mm3      Lymphocytes, Absolute 0.87 10*3/mm3      Monocytes, Absolute 0.50 10*3/mm3      Eosinophils, Absolute 0.18 10*3/mm3      Basophils, Absolute 0.06 10*3/mm3      Immature Grans, Absolute 0.01 10*3/mm3      nRBC 0.0 /100 WBC     POC Glucose Once [[id number]]  (Normal) Collected: 02/25/22 2115    Specimen: Blood Updated: 02/25/22 2116     Glucose 115 mg/dL      Comment: Meter: [id number] : [id number] Joslyn ALEJO       Clostridium Difficile EIA - Stool, Per Rectum [[id number]] Collected: 02/24/22 0625    Specimen: Stool from Per Rectum Updated: 02/25/22 1612     C difficile Toxins A+B, EIA Negative    Narrative:      Performed at:  [address] [place] [address]  [id number]  : Obed Paul PhD, Phone:  [phone]    POC Glucose Once [[id number]]  (Normal) Collected: 02/25/22 1528    Specimen: Blood Updated: 02/25/22 1530     Glucose 98 mg/dL      Comment: Meter: [id number] : [id number] Mary ALEJO       POC Glucose Once [[id number]]  (Abnormal) Collected: 02/25/22 1109    Specimen: Blood Updated: 02/25/22 1111     Glucose 210 mg/dL      Comment: Meter: [id number] : [id number] Mary ALEJO       Renal Function Panel [[id number]]  (Abnormal) Collected: 02/25/22 0542    Specimen: Blood Updated: 02/25/22 0636     Glucose 129 mg/dL      BUN 19 mg/dL      Creatinine 1.26 mg/dL      Sodium 140 mmol/L      Potassium 3.8 mmol/L      Chloride 103 mmol/L      CO2 30.9 mmol/L      Calcium 8.5 mg/dL      Albumin 2.30 g/dL      Phosphorus 2.7 mg/dL      Anion Gap 6.1 mmol/L      BUN/Creatinine Ratio 15.1     eGFR Non African Amer 55 mL/min/1.73     Narrative:      GFR Normal >60  Chronic Kidney Disease <60  Kidney Failure  <15      POC Glucose Once [[id number]]  (Abnormal) Collected: 02/25/22 0627    Specimen: Blood Updated: 02/25/22 0628     Glucose 134 mg/dL      Comment: Meter: [id number] : [id number] Lucian BURKS       aPTT [[id number]]  (Abnormal) Collected: 02/25/22 0542    Specimen: Blood Updated: 02/25/22 0624     PTT 56.1 seconds     Protime-INR [[id number]]  (Abnormal) Collected: 02/25/22 0542    Specimen: Blood Updated: 02/25/22 0624     Protime 34.4 Seconds      INR 3.37    CBC & Differential [[id number]]  (Abnormal) Collected: 02/25/22 0542    Specimen: Blood Updated: 02/25/22 0609    Narrative:      The following orders were created for panel order CBC & Differential.  Procedure                               Abnormality         Status                     ---------                               -----------         ------                     CBC Auto Differential[[id number]]        Abnormal            Final result                 Please view results for these tests on the individual orders.    CBC Auto Differential [[id number]]  (Abnormal) Collected: 02/25/22 0542    Specimen: Blood Updated: 02/25/22 0609     WBC 6.02 10*3/mm3      RBC 4.62 10*6/mm3      Hemoglobin 12.0 g/dL      Hematocrit 37.7 %      MCV 81.6 fL      MCH 26.0 pg      MCHC 31.8 g/dL      RDW 15.6 %      RDW-SD 46.3 fl      MPV 10.9 fL      Platelets 175 10*3/mm3      Neutrophil % 74.7 %      Lymphocyte % 14.3 %      Monocyte % 6.8 %      Eosinophil % 3.0 %      Basophil % 0.7 %      Immature Grans % 0.5 %      Neutrophils, Absolute 4.50 10*3/mm3      Lymphocytes, Absolute 0.86 10*3/mm3      Monocytes, Absolute 0.41 10*3/mm3      Eosinophils, Absolute 0.18 10*3/mm3      Basophils, Absolute 0.04 10*3/mm3      Immature Grans, Absolute 0.03 10*3/mm3      nRBC 0.0 /100 WBC     POC Glucose Once [[id number]]  (Abnormal) Collected: 02/24/22 2025    Specimen: Blood Updated: 02/24/22 2026     Glucose 152 mg/dL      Comment: Meter: [id number] : [id number]  Lucian Arnoldey PCA       POC Glucose Once [[id number]]  (Abnormal) Collected: 02/24/22 1621    Specimen: Blood Updated: 02/24/22 1622     Glucose 213 mg/dL      Comment: Meter: [id number] : [id number] Apprissa NA       POC Glucose Once [[id number]]  (Abnormal) Collected: 02/24/22 1114    Specimen: Blood Updated: 02/24/22 1118     Glucose 192 mg/dL      Comment: Meter: [id number] : [id number] Padilla Sravanthi NA       POC Glucose Once [[id number]]  (Abnormal) Collected: 02/24/22 0623    Specimen: Blood Updated: 02/24/22 0630     Glucose 142 mg/dL      Comment: Meter: [id number] : [id number] Lucian Laura PCA       Renal Function Panel [[id number]]  (Abnormal) Collected: 02/24/22 0444    Specimen: Blood Updated: 02/24/22 0557     Glucose 125 mg/dL      BUN 17 mg/dL      Creatinine 0.92 mg/dL      Sodium 140 mmol/L      Potassium 3.8 mmol/L      Chloride 105 mmol/L      CO2 26.4 mmol/L      Calcium 8.2 mg/dL      Albumin 2.20 g/dL      Phosphorus 2.7 mg/dL      Anion Gap 8.6 mmol/L      BUN/Creatinine Ratio 18.5     eGFR Non African Amer 78 mL/min/1.73     Narrative:      GFR Normal >60  Chronic Kidney Disease <60  Kidney Failure <15      aPTT [[id number]]  (Abnormal) Collected: 02/24/22 0444    Specimen: Blood Updated: 02/24/22 0538     PTT 59.0 seconds     Protime-INR [[id number]]  (Abnormal) Collected: 02/24/22 0444    Specimen: Blood Updated: 02/24/22 0538     Protime 34.4 Seconds      INR 3.37    CBC & Differential [[id number]]  (Abnormal) Collected: 02/24/22 0444    Specimen: Blood Updated: 02/24/22 0531    Narrative:      The following orders were created for panel order CBC & Differential.  Procedure                               Abnormality         Status                     ---------                               -----------         ------                     CBC Auto Differential[[id number]]        Abnormal            Final result                 Please view results for these tests on the individual orders.     "CBC Auto Differential [[id number]]  (Abnormal) Collected: 02/24/22 0444    Specimen: Blood Updated: 02/24/22 0531     WBC 6.15 10*3/mm3      RBC 4.60 10*6/mm3      Hemoglobin 11.9 g/dL      Hematocrit 36.0 %      MCV 78.3 fL      MCH 25.9 pg      MCHC 33.1 g/dL      RDW 15.7 %      RDW-SD 43.8 fl      MPV 10.9 fL      Platelets 161 10*3/mm3      Neutrophil % 74.6 %      Lymphocyte % 13.8 %      Monocyte % 7.5 %      Eosinophil % 3.1 %      Basophil % 0.7 %      Immature Grans % 0.3 %      Neutrophils, Absolute 4.59 10*3/mm3      Lymphocytes, Absolute 0.85 10*3/mm3      Monocytes, Absolute 0.46 10*3/mm3      Eosinophils, Absolute 0.19 10*3/mm3      Basophils, Absolute 0.04 10*3/mm3      Immature Grans, Absolute 0.02 10*3/mm3      nRBC 0.0 /100 WBC     POC Glucose Once [[id number]]  (Abnormal) Collected: 02/23/22 2026    Specimen: Blood Updated: 02/23/22 2028     Glucose 241 mg/dL      Comment: Meter: [id number] : [id number] Lucian Sanchez [facility]       POC Glucose Once [[id number]]  (Abnormal) Collected: 02/23/22 1605    Specimen: Blood Updated: 02/23/22 1612     Glucose 141 mg/dL      Comment: Meter: [id number] : [id number] Evanshannon Kaufman MELO           /68 (BP Location: Left arm, Patient Position: Sitting)   Pulse 89   Temp 97.8 °F (36.6 °C) (Oral)   Resp 16   Ht 182.9 cm (72.01\")   Wt 62.7 kg (138 lb 4.8 oz)   SpO2 94%   BMI 18.75 kg/m²     Discharge Exam:  General Appearance:    Alert, cooperative, no distress                          Head:    Normocephalic, without obvious abnormality, atraumatic                          Eyes:                            Throat:   Lips, tongue, gums normal                          Neck:   Supple, symmetrical, trachea midline, no JVD                        Lungs:     Clear to auscultation bilaterally, respirations unlabored                Chest Wall:    No tenderness or deformity                        Heart:    Regular rate and rhythm, S1 and S2 normal, no murmur,no  Rub " or gallop                  Abdomen:     Soft, non-tender, bowel sounds active, no masses, no organomegaly                  Extremities:   Extremities normal, atraumatic, no cyanosis or edema                             Skin:   Skin is warm and dry,  no rashes or palpable lesions                  Neurologic:   no focal deficits noted     Disposition:  Home with home health    Patient Instructions:      Discharge Medications      New Medications      Instructions Start Date   colestipol 1 g tablet  Commonly known as: Colestid   1 g, Oral, 2 Times Daily      famotidine 20 MG tablet  Commonly known as: PEPCID   20 mg, Oral, 2 Times Daily Before Meals      lactobacillus acidophilus capsule capsule   1 capsule, Oral, Daily   Start Date: March 3, 2022     polycarbophil 625 MG tablet tablet   1,250 mg, Oral, 2 Times Daily With Meals      saccharomyces boulardii 250 MG capsule  Commonly known as: FLORASTOR   250 mg, Oral, 2 Times Daily      sodium bicarbonate 650 MG tablet   1,300 mg, Oral, 3 Times Daily      vancomycin 125 MG capsule  Commonly known as: VANCOCIN   125 mg, Oral, Every 6 Hours Scheduled      vancomycin 125 MG capsule  Commonly known as: VANCOCIN   125 mg, Oral, Every 12 Hours   Start Date: March 5, 2022     vancomycin 125 MG capsule  Commonly known as: VANCOCIN   125 mg, Oral, Every 24 Hours   Start Date: March 12, 2022     vancomycin 125 MG capsule  Commonly known as: VANCOCIN   125 mg, Oral, Every Other Day   Start Date: March 19, 2022        Changes to Medications      Instructions Start Date   metoprolol succinate XL 25 MG 24 hr tablet  Commonly known as: TOPROL-XL  What changed: how much to take   12.5 mg, Oral, Daily   Start Date: March 3, 2022        Continue These Medications      Instructions Start Date   aspirin 81 MG EC tablet   81 mg, Oral, Daily      atorvastatin 40 MG tablet  Commonly known as: LIPITOR   40 mg, Oral, Daily      digoxin 125 MCG tablet  Commonly known as: LANOXIN   TAKE ONE TABLET  BY MOUTH DAILY      diphenhydrAMINE 25 mg capsule  Commonly known as: BENADRYL   25 mg, Oral, 2 Times Daily PRN      ferrous gluconate 324 MG tablet  Commonly known as: FERGON   324 mg, Oral, Every Other Day      fish oil 1000 MG capsule capsule   4,000 mg, Oral, Daily With Breakfast      furosemide 40 MG tablet  Commonly known as: Lasix   40 mg, Oral, Daily      insulin lispro 100 UNIT/ML injection  Commonly known as: humaLOG   USE AS DIRECTED WITH V-GO PUMP      [employer] Autolet Lancing Device misc   1 each, Does not apply, 3 Times Daily      [employer] Blood Glucose kit   1 each, Does not apply, 3 Times Daily      [employer] Blood Glucose Test test strip  Generic drug: glucose blood   1 each, Other, 3 Times Daily      magnesium oxide 400 MG tablet  Commonly known as: MAG-OX   400 mg, Oral, 2 Times Daily      Pen Needles 32G X 4 MM misc   1 each, Does not apply, Daily PRN, Use with Tresiba       TRESIBA FLEXTOUCH SC   30-65 Units, Subcutaneous, Daily PRN, Use as directed if blood sugar gets too high      V-Go 40 kit   USE AS DIRECTED THREE TIMES A DAY      warfarin 5 MG tablet  Commonly known as: COUMADIN   TAKE 1 AND 1/2 TABLET BY MOUTH DAILY ON MONDAY, WEDNESDAY, AND FRIDAY.  TAKE ONE TABLET BY MOUTH DAILY ON ALL OTHER DAYS OF THE WEEK         Stop These Medications    azithromycin 500 MG tablet  Commonly known as: ZITHROMAX     guaiFENesin 600 MG 12 hr tablet  Commonly known as: MUCINEX     mupirocin 2 % ointment  Commonly known as: BACTROBAN     pantoprazole 40 MG EC tablet  Commonly known as: PROTONIX     rifAXIMin 200 MG tablet  Commonly known as: Xifaxan          Future Appointments   Date Time Provider Department Center   3/15/2022  9:00 AM Chery Copeland PA-C [facility]   3/16/2022  9:20 AM Harika Alanis APRN [facility]   3/21/2022  8:15 AM Rubin Hinkle APRN [facility]   4/1/2022 11:10 AM Kunal Copeland DO [facility]   8/3/2022  8:00 AM Griffin Fried MD [facility]  [facility]     Additional Instructions for the Follow-ups that You Need to Schedule     Ambulatory Referral to Home Health (Hospital)   As directed      Face to Face Visit Date: 3/2/2022    Follow-up provider for Plan of Care?: I treated the patient in an acute care facility and will not continue treatment after discharge.    Follow-up provider: EPI BARRERA [[id number]]    Reason/Clinical Findings: weak    Describe mobility limitations that make leaving home difficult: weakness    Nursing/Therapeutic Services Requested: Skilled Nursing Physical Therapy    Skilled nursing orders: Other    PT orders: Therapeutic exercise    Frequency: 1 Week 1            Contact information for follow-up providers     Epi Barrera APRN Follow up in 1 week(s).    Specialties: Family Medicine, Urgent Care, Emergency Medicine  Contact information:  [address] [facility] [id number] [phone]                   Contact information for after-discharge care     Home Medical Care     [facility] .    Service: Home Health Services  Contact information:  [address]  [facility] [address] [phone]                           Discharge Order (From admission, onward)     Start     Ordered    03/02/22 1210  Discharge patient  Once        Expected Discharge Date: 03/02/22    Discharge Disposition: Home-Health Care [facility] [place]    Physician of Record for Attribution - Please select from Treatment Team: KIEL JUAREZ [[id number]]    Review needed by CMO to determine Physician of Record: No       Question Answer Comment   Physician of Record for Attribution - Please select from Treatment Team KIEL JUAREZ    Review needed by CMO to determine Physician of Record No        03/02/22 1214                Total time spent discharging patient including evaluation,post hospitalization follow up,  medication and post hospitalization instructions and education total time exceeds 30 minutes.    Signed:  Kiel Juarez  MD  3/2/2022  12:15 EST    Electronically signed by Leobardo Juarez MD at 03/02/22 1218       Discharge Order (From admission, onward)     Start     Ordered    03/02/22 1210  Discharge patient  Once        Expected Discharge Date: 03/02/22    Discharge Disposition: [place] [facility]    Physician of Record for Attribution - Please select from Treatment Team: LEOBARDO JUAREZ [[id number]]    Review needed by CMO to determine Physician of Record: No       Question Answer Comment   Physician of Record for Attribution - Please select from Treatment Team LEOBARDO JUAREZ    Review needed by CMO to determine Physician of Record No        03/02/22 1219

## 2022-03-04 ENCOUNTER — HOME CARE VISIT (OUTPATIENT)
Dept: HOME HEALTH SERVICES | Facility: HOME HEALTHCARE | Age: 87
End: 2022-03-04

## 2022-03-04 ENCOUNTER — ANTICOAGULATION VISIT (OUTPATIENT)
Dept: PHARMACY | Facility: HOSPITAL | Age: 87
End: 2022-03-04

## 2022-03-04 VITALS
RESPIRATION RATE: 17 BRPM | TEMPERATURE: 97.4 F | SYSTOLIC BLOOD PRESSURE: 118 MMHG | HEART RATE: 68 BPM | DIASTOLIC BLOOD PRESSURE: 58 MMHG

## 2022-03-04 DIAGNOSIS — I63.411 CEREBROVASCULAR ACCIDENT (CVA) DUE TO EMBOLISM OF RIGHT MIDDLE CEREBRAL ARTERY: ICD-10-CM

## 2022-03-04 DIAGNOSIS — Z95.2 HX OF AORTIC VALVE REPLACEMENT, MECHANICAL: Primary | ICD-10-CM

## 2022-03-04 LAB — INR PPP: 3.4

## 2022-03-04 PROCEDURE — G0151 HHCP-SERV OF PT,EA 15 MIN: HCPCS

## 2022-03-04 NOTE — PROGRESS NOTES
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]             Anticoagulation Episode Summary     INR check location:      Preferred lab:      Send INR reminders to:   [facility] GUTIERREZ  POOL    Comments:  New INR goal 3 - 3.5 (see 1/2020 hospitalization for CVA)      Anticoagulation Care Providers     Provider Role Specialty Phone number    Griffin Fried MD Referring Cardiology [phone]          Clinic Interview:  Patient Findings     Positives:  Hospital admission    Negatives:  Signs/symptoms of thrombosis, Signs/symptoms of bleeding,   Laboratory test error suspected, Change in health, Change in alcohol use,   Change in activity, Upcoming invasive procedure, Emergency department   visit, Upcoming dental procedure, Missed doses, Extra doses, Change in   medications, Change in diet/appetite, Bruising, Other complaints    Comments:  admitted on 2/14 for c.diff, discharged on 3/2. Started on   famotidine, lactobacillus, oral vanc, stopped Protonix and rifaximin.   Dosing in hospital is very similar to outpatient dosing.       Clinical Outcomes     Negatives:  Major bleeding event, Thromboembolic event,   Anticoagulation-related hospital admission, Anticoagulation-related ED   visit, Anticoagulation-related fatality    Comments:  admitted on 2/14 for c.diff, discharged on 3/2. Started on    famotidine, lactobacillus, oral vanc, stopped Protonix and rifaximin.   Dosing in hospital is very similar to outpatient dosing.         INR History:  Anticoagulation Monitoring 2/4/2022 2/11/2022 3/4/2022   INR 3.20 3.50 3.40   INR Date 2/4/2022 2/11/2022 3/4/2022   INR Goal 3.0-3.5 3.0-3.5 3.0-3.5   Trend Same Same Same   Last Week Total 42.5 mg 42.5 mg 42.5 mg   Next Week Total 42.5 mg 42.5 mg 42.5 mg   Sun 5 mg 5 mg 5 mg   Mon 7.5 mg 7.5 mg 7.5 mg   Tue 5 mg 5 mg 5 mg   Wed 7.5 mg 7.5 mg 7.5 mg   Thu 5 mg 5 mg 5 mg   Fri 7.5 mg 7.5 mg 7.5 mg   Sat 5 mg 5 mg 5 mg   Visit Report - - -   Some recent data might be hidden       Plan:  1. INR is Therapeutic today- see above in Anticoagulation Summary.   Will instruct Francisco Sequeira to continue their warfarin regimen- see above in Anticoagulation Summary.  2. Follow up in 1 weeks  3. . They have been instructed to call if any changes in medications, doses, concerns, etc. Patient expresses understanding and has no further questions at this time.    Michelle Piña [facility]

## 2022-03-07 ENCOUNTER — OFFICE VISIT (OUTPATIENT)
Dept: FAMILY MEDICINE CLINIC | Facility: CLINIC | Age: 87
End: 2022-03-07

## 2022-03-07 VITALS
SYSTOLIC BLOOD PRESSURE: 132 MMHG | DIASTOLIC BLOOD PRESSURE: 64 MMHG | OXYGEN SATURATION: 95 % | TEMPERATURE: 97.1 F | HEART RATE: 59 BPM | WEIGHT: 148 LBS | HEIGHT: 72 IN | BODY MASS INDEX: 20.05 KG/M2

## 2022-03-07 DIAGNOSIS — A04.72 C. DIFFICILE COLITIS: ICD-10-CM

## 2022-03-07 DIAGNOSIS — Z09 HOSPITAL DISCHARGE FOLLOW-UP: Primary | ICD-10-CM

## 2022-03-07 DIAGNOSIS — E11.69 TYPE 2 DIABETES MELLITUS WITH OTHER SPECIFIED COMPLICATION, UNSPECIFIED WHETHER LONG TERM INSULIN USE: ICD-10-CM

## 2022-03-07 DIAGNOSIS — I10 PRIMARY HYPERTENSION: Chronic | ICD-10-CM

## 2022-03-07 DIAGNOSIS — K21.9 GASTROESOPHAGEAL REFLUX DISEASE WITHOUT ESOPHAGITIS: Chronic | ICD-10-CM

## 2022-03-07 PROCEDURE — 1111F DSCHRG MED/CURRENT MED MERGE: CPT | Performed by: NURSE PRACTITIONER

## 2022-03-07 PROCEDURE — 99495 TRANSJ CARE MGMT MOD F2F 14D: CPT | Performed by: NURSE PRACTITIONER

## 2022-03-07 RX ORDER — FLASH GLUCOSE SENSOR
1 KIT MISCELLANEOUS
Qty: 6 EACH | Refills: 3 | Status: SHIPPED | OUTPATIENT
Start: 2022-03-07 | End: 2022-03-23

## 2022-03-07 RX ORDER — FLASH GLUCOSE SCANNING READER
1 EACH MISCELLANEOUS
Qty: 6 EACH | Refills: 3 | Status: SHIPPED | OUTPATIENT
Start: 2022-03-07 | End: 2022-03-23

## 2022-03-07 NOTE — CASE COMMUNICATION
SOC 3/3 DIAG: CDIFF, CKD3, HTN, GERD, AFIB, DM. Patient is an 86 y.o.male lives in his home with wife. He has hx of mechanical aortic valve on warfarin, PAF, CKD3 with prior nephrectomy, DM2, HTN, HLD, GERD and J LUIS . He went to ER with c/o diarrhea that had been going on for some time. Started around 1/23/21 and he had been to pcp, er, several times He has had poor oral intake with some intermittent nausea and vomiting (also non-bloody) . Patient was placed on tapering course of vancomycin for C. difficile colitis. He was up walking with walker, Wife normally works during the day and he is home alone. Patient spends most of his time sitting in chair in lving room. Rev meds in home no issues , has all meds except for his polycarbophil and lactobacillis, wife manages meds, states pharmacy out they are to order.

## 2022-03-07 NOTE — PROGRESS NOTES
Transitional Care Follow Up Visit  Subjective     Francisco Sequeira is a 86 y.o. male who presents for a transitional care management visit.    Within 48 business hours after discharge our office contacted him via telephone to coordinate his care and needs.      I reviewed and discussed the details of that call along with the discharge summary, hospital problems, inpatient lab results, inpatient diagnostic studies, and consultation reports with Francisco.     Current outpatient and discharge medications have been reconciled for the patient.  Reviewed by: LIZA Trinh      Date of TCM Phone Call 3/2/2022   [facility]   Date of Admission 2/14/2022   Date of Discharge 3/2/2022   Discharge Disposition Home-Health Care [facility] [place]     Risk for Readmission (LACE) Score: 18 (3/2/2022  6:01 AM)      History of Present Illness   Course During Hospital Stay:  Patient presented to the emergency room on 2/14/2022 for diarrhea.  He was previously treated for the diarrhea from the urgent care center with erythromycin but it did not improve so presented to the emergency room.  Stool sample was upon taken at the time and he was discharged home with no medications at that ER visit which was 1/31/2022.  The diarrhea was persistent and unrelenting so presented back to the emergency room on 2/14.  The stool was liquid watery and nonbloody with multiple occasions throughout the day.  He denied any abdominal pain cramping or fever but did have some chills and weakness.  He was having poor oral intake with some intermittent nausea and vomiting as well.  White blood cells were elevated at 20.13 and hemoglobin was 17.  Creatinine was 3.31.  LFTs were normal.  GI PCR was negative and C. difficile was positive.  He was started on fluids and oral vancomycin and admitted for further care.  He was discharged with home health services with physical therapy and follow-up with primary care in 1 week.  Pt has gained 7 pounds back.  Home health has been to house but hasn't started PT yet.  Will this week.  Diarrhea is gone. Still on Vancomycin. Has appointments set for cariology and GI follow ups next week. Follow up with ID April 1st.  Added Pepcid, Colestipol, probiotics. Decreased Metoprolol to 1/2 tab daily.     The following portions of the patient's history were reviewed and updated as appropriate: allergies, current medications, past family history, past medical history, past social history, past surgical history and problem list.    Review of Systems   All other systems reviewed and are negative.      Objective   Physical Exam  Vitals reviewed.   Constitutional:       General: He is not in acute distress.     Appearance: He is well-developed.   HENT:      Head: Normocephalic.   Cardiovascular:      Rate and Rhythm: Normal rate and regular rhythm.      Heart sounds: Normal heart sounds.   Pulmonary:      Effort: Pulmonary effort is normal.      Breath sounds: Normal breath sounds.   Neurological:      Mental Status: He is alert and oriented to person, place, and time.      Gait: Gait normal.   Psychiatric:         Behavior: Behavior normal.         Thought Content: Thought content normal.         Judgment: Judgment normal.         Assessment/Plan   Diagnoses and all orders for this visit:    1. Hospital discharge follow-up (Primary)    2. C. difficile colitis    3. Gastroesophageal reflux disease without esophagitis    4. Primary hypertension    5. Type 2 diabetes mellitus with other specified complication, unspecified whether long term insulin use (HCC)  -     Continuous Blood Gluc  (FreeStyle Vance 14 Day Vergas) device; 1 application Every 14 (Fourteen) Days.  Dispense: 6 each; Refill: 3  -     Continuous Blood Gluc Sensor (FreeStyle Vance 14 Day Sensor) misc; 1 application Every 14 (Fourteen) Days.  Dispense: 6 each; Refill: 3        Cont same with all meds  rto for a1c at appointment set  Cont with specialist follow  ups    Mask and googles worn

## 2022-03-08 ENCOUNTER — HOME CARE VISIT (OUTPATIENT)
Dept: HOME HEALTH SERVICES | Facility: HOME HEALTHCARE | Age: 87
End: 2022-03-08

## 2022-03-08 VITALS
TEMPERATURE: 97.9 F | OXYGEN SATURATION: 98 % | RESPIRATION RATE: 20 BRPM | DIASTOLIC BLOOD PRESSURE: 64 MMHG | SYSTOLIC BLOOD PRESSURE: 138 MMHG | HEART RATE: 68 BPM

## 2022-03-08 PROCEDURE — G0299 HHS/HOSPICE OF RN EA 15 MIN: HCPCS

## 2022-03-09 ENCOUNTER — HOME CARE VISIT (OUTPATIENT)
Dept: HOME HEALTH SERVICES | Facility: HOME HEALTHCARE | Age: 87
End: 2022-03-09

## 2022-03-09 VITALS
DIASTOLIC BLOOD PRESSURE: 70 MMHG | TEMPERATURE: 97.5 F | RESPIRATION RATE: 18 BRPM | HEART RATE: 69 BPM | SYSTOLIC BLOOD PRESSURE: 132 MMHG | OXYGEN SATURATION: 99 %

## 2022-03-09 PROCEDURE — G0151 HHCP-SERV OF PT,EA 15 MIN: HCPCS

## 2022-03-09 NOTE — HOME HEALTH
"Subjective: \"I have the exercises on my fridge from last time you saw me.\"    Falls reported: none    Medication changes: none      Plan for next visit: Continue gait training with rolling walker to build strength and endurance, transfer training on off all household surfaces, reinstruct HEP and upgrade with increased reps as tolerated, and patient spouse education as needed"

## 2022-03-09 NOTE — HOME HEALTH
post hospitalization for cdiff.   doing better, continued education/ cp assess   no issues at visit,   therapy is working with patient as well

## 2022-03-09 NOTE — CASE COMMUNICATION
SOC 3/3   DIAG: CDIFF, CKD3, HTN, GERD, AFIB, DM.   Patient is an 86 y.o.male lives in his home with wife. He has hx of mechanical aortic valve on warfarin, PAF, CKD3 with prior nephrectomy, DM2, HTN, HLD, GERD and J LUIS . He went to   ER with c/o diarrhea that had been going on for some time. Started around 1/23/21 and he had been to pcp, er, several times   He has had poor oral intake with some intermittent nausea and vomiting (also non -bloody). Patient was placed on tapering course of vancomycin for C. difficile colitis. He was up walking with walker, Wife normally works during the day and he is home alone. Patient spends most of his time sitting in chair in lving room. Rev meds in home no issues , has all meds except for his polycarbophil and lactobacillis, wife manages meds, states pharmacy out they are to order.

## 2022-03-11 ENCOUNTER — ANTICOAGULATION VISIT (OUTPATIENT)
Dept: PHARMACY | Facility: HOSPITAL | Age: 87
End: 2022-03-11

## 2022-03-11 ENCOUNTER — HOME CARE VISIT (OUTPATIENT)
Dept: HOME HEALTH SERVICES | Facility: HOME HEALTHCARE | Age: 87
End: 2022-03-11

## 2022-03-11 ENCOUNTER — READMISSION MANAGEMENT (OUTPATIENT)
Dept: CALL CENTER | Facility: HOSPITAL | Age: 87
End: 2022-03-11

## 2022-03-11 VITALS
SYSTOLIC BLOOD PRESSURE: 122 MMHG | OXYGEN SATURATION: 97 % | RESPIRATION RATE: 20 BRPM | HEART RATE: 78 BPM | TEMPERATURE: 97.2 F | DIASTOLIC BLOOD PRESSURE: 78 MMHG

## 2022-03-11 VITALS
HEART RATE: 79 BPM | DIASTOLIC BLOOD PRESSURE: 72 MMHG | RESPIRATION RATE: 18 BRPM | OXYGEN SATURATION: 98 % | SYSTOLIC BLOOD PRESSURE: 136 MMHG | TEMPERATURE: 97.5 F

## 2022-03-11 DIAGNOSIS — Z95.2 HX OF AORTIC VALVE REPLACEMENT, MECHANICAL: Primary | ICD-10-CM

## 2022-03-11 DIAGNOSIS — I63.411 CEREBROVASCULAR ACCIDENT (CVA) DUE TO EMBOLISM OF RIGHT MIDDLE CEREBRAL ARTERY: ICD-10-CM

## 2022-03-11 LAB — INR PPP: 3.2

## 2022-03-11 PROCEDURE — G0151 HHCP-SERV OF PT,EA 15 MIN: HCPCS

## 2022-03-11 PROCEDURE — G0300 HHS/HOSPICE OF LPN EA 15 MIN: HCPCS

## 2022-03-11 NOTE — PROGRESS NOTES
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    INR History:  Anticoagulation Monitoring 2/11/2022 3/4/2022 3/11/2022   INR 3.50 3.40 3.20   INR Date 2/11/2022 3/4/2022 3/11/2022   INR Goal 3.0-3.5 3.0-3.5 3.0-3.5   Trend Same Same Same   Last Week Total 42.5 mg 42.5 mg 42.5 mg   Next Week Total 42.5 mg 42.5 mg 42.5 mg   Sun 5 mg 5 mg 5 mg   Mon 7.5 mg 7.5 mg 7.5 mg   Tue 5 mg 5 mg 5 mg   Wed 7.5 mg 7.5 mg 7.5 mg   Thu 5 mg 5 mg 5 mg   Fri 7.5 mg 7.5 mg 7.5 mg   Sat 5 mg 5 mg 5 mg   Visit Report - - -   Some recent data might be hidden       Plan:  1. INR is therapeutic today- see above in Anticoagulation Summary.    Francisco Sequeira to continue their warfarin regimen- see above in Anticoagulation Summary.  2. Follow up in 1 weeks  3. Pt has agreed to only be called if INR out of range. They have been instructed to call if any changes in medications, doses, concerns, etc. Patient  expresses understanding and has no further questions at this time.    Princess Robin

## 2022-03-11 NOTE — OUTREACH NOTE
Medical Week 2 Survey    Flowsheet Row Responses   [facility] patient discharged from? [place]   Does the patient have one of the following disease processes/diagnoses(primary or secondary)? Other   Week 2 attempt successful? Yes   Call start time 1441   Call end time 1443   Person spoke with today (if not patient) and relationship Gladys   Meds reviewed with patient/caregiver? Yes   Is the patient taking all medications as directed (includes completed medication regime)? Yes   Comments regarding PCP Hospital d/c f/u appt on 3/7/22 @2:15pm   Has the patient kept scheduled appointments due by today? Yes   Comments walker, PT and HH comes.   What is the patient's perception of their health status since discharge? Improving   Week 2 Call Completed? Yes   Wrap up additional comments Wife states heis doing well, has gained 6 #, no new issues no questions today, diarrhea, is less.           GRACY GONZALEZ - Registered Nurse

## 2022-03-11 NOTE — HOME HEALTH
"Subjective: \"I am starting to get a little appetite back.\"    Falls reported: none    Medication changes: none      Plan for next visit: Continue gait training with rolling walker and assess with cane, reinstruct HEP with balance upgrades as tolerated, and patient spouse education as needed."

## 2022-03-14 ENCOUNTER — HOME CARE VISIT (OUTPATIENT)
Dept: HOME HEALTH SERVICES | Facility: HOME HEALTHCARE | Age: 87
End: 2022-03-14

## 2022-03-14 VITALS
TEMPERATURE: 97.2 F | SYSTOLIC BLOOD PRESSURE: 130 MMHG | HEART RATE: 74 BPM | OXYGEN SATURATION: 99 % | DIASTOLIC BLOOD PRESSURE: 70 MMHG | RESPIRATION RATE: 18 BRPM

## 2022-03-14 PROCEDURE — G0151 HHCP-SERV OF PT,EA 15 MIN: HCPCS

## 2022-03-14 NOTE — HOME HEALTH
pt ambulates adsha abrams rw, no c/o pain, VSS , pt compliant with his medicaton schedule, no adverse reactions reported

## 2022-03-15 ENCOUNTER — OFFICE VISIT (OUTPATIENT)
Dept: GASTROENTEROLOGY | Facility: CLINIC | Age: 87
End: 2022-03-15

## 2022-03-15 ENCOUNTER — HOME CARE VISIT (OUTPATIENT)
Dept: HOME HEALTH SERVICES | Facility: HOME HEALTHCARE | Age: 87
End: 2022-03-15

## 2022-03-15 VITALS
SYSTOLIC BLOOD PRESSURE: 126 MMHG | HEART RATE: 70 BPM | WEIGHT: 315 LBS | BODY MASS INDEX: 43.06 KG/M2 | RESPIRATION RATE: 18 BRPM | TEMPERATURE: 96.9 F | OXYGEN SATURATION: 94 % | DIASTOLIC BLOOD PRESSURE: 68 MMHG

## 2022-03-15 VITALS — WEIGHT: 146.9 LBS | TEMPERATURE: 96.2 F | BODY MASS INDEX: 19.9 KG/M2 | HEIGHT: 72 IN

## 2022-03-15 DIAGNOSIS — A04.72 C. DIFFICILE COLITIS: Primary | ICD-10-CM

## 2022-03-15 DIAGNOSIS — K55.20 ANGIODYSPLASIA OF COLON WITHOUT HEMORRHAGE: ICD-10-CM

## 2022-03-15 DIAGNOSIS — K21.9 GASTROESOPHAGEAL REFLUX DISEASE WITHOUT ESOPHAGITIS: ICD-10-CM

## 2022-03-15 PROCEDURE — G0300 HHS/HOSPICE OF LPN EA 15 MIN: HCPCS

## 2022-03-15 PROCEDURE — 99214 OFFICE O/P EST MOD 30 MIN: CPT | Performed by: PHYSICIAN ASSISTANT

## 2022-03-15 NOTE — PROGRESS NOTES
"Chief Complaint  Hospital Follow Up Visit (diarrhea)    Subjective          History of Present Illness    Francisco Sequeira is a  86 y.o. male presents for hospital follow-up diagnosed with C. difficile.  He was initially consulted by Dr. Tinoco for October admission.     He was found to be C. difficile positive at February 2022 hospital stay and started on vancomycin with tapering dose.  He was also started on colestipol given his persistent diarrhea while at the hospital.  I also started him on Pepcid 20 mg twice daily given recent history of gastritis and duodenitis.  PPIs were avoided due to C. Difficile. He is currently having formed stools 1-2 times/day after meals which is normal for him.  He denies fever, hematochezia, abdominal pain.    Initial admission in October 2021 and then again in November, we were consulted both times for anemia.  He is anticoagulated on warfarin for mechanical heart valve as well as A. Fib.  He also has a history of stroke.  He denies melena, medic easier, diarrhea, constipation.  He denies further melena or hematochezia.    He has a family history of colon cancer in 2 first-degree relatives--brother and mother.     Colonoscopy on 11/9/2021 was admitted showed diverticulosis, A single colonic angiodysplastic lesion--APC cautery used.       EGD on 10/28/2021 showed gastritis, duodenal erosion without bleeding.  Pathology showed chronic peptic duodenitis, reactive hyperplasia of the stomach, negative H. pylori.  Colonoscopy the same time showed 7 colon polyps--all tubular adenomas.    His 2017 colonoscopy Dr. Harrington showed multiple colonic adenomas.    Objective   Vital Signs:   Temp 96.2 °F (35.7 °C)   Ht 182.9 cm (72\")   Wt 66.6 kg (146 lb 14.4 oz)   BMI 19.92 kg/m²       Physical Exam  Vitals reviewed.   Constitutional:       General: He is awake. He is not in acute distress.     Appearance: Normal appearance. He is well-developed and well-groomed.   HENT:      Head: " Normocephalic.   Pulmonary:      Effort: Pulmonary effort is normal. No respiratory distress.   Skin:     Coloration: Skin is not pale.   Neurological:      Mental Status: He is alert and oriented to person, place, and time.      Gait: Gait abnormal.      Comments: Uses walker   Psychiatric:         Mood and Affect: Mood and affect normal.         Speech: Speech normal.         Behavior: Behavior is cooperative.         Judgment: Judgment normal.          Result Review :             Assessment and Plan    Diagnoses and all orders for this visit:    1. C. difficile colitis (Primary)    2. Gastroesophageal reflux disease without esophagitis    3. Angiodysplasia of colon without hemorrhage    Continue fiber and florastor.  Complete Vancomycin--down to one per day currently and will take every other day starting on 3/19/22 for 7 days.     Drop colestipol to QAM and then continue to wean off as long as diarrhea does not recur.    Continue pepcid 20mg BID.  Monitor for blood in stool or black stools.      Follow Up   Return in about 5 weeks (around 4/19/2022) for Chery or Dr. Tinoco.    Dragon dictation used throughout this note.     I spent 30 minutes caring for Mr. Sequeira on this date of service. This time includes time spent by me in the following activities: preparing for the visit, reviewing tests, obtaining and/or reviewing a separately obtained history, performing a medically appropriate examination and/or evaluation , counseling and educating the patient/family/caregiver, ordering medications, tests, or procedures, documenting information in the medical record, independently interpreting results and communicating that information with the patient/family/caregiver and care coordination.      Chery Copeland PA-C

## 2022-03-16 ENCOUNTER — OFFICE VISIT (OUTPATIENT)
Dept: CARDIOLOGY | Facility: CLINIC | Age: 87
End: 2022-03-16

## 2022-03-16 VITALS
SYSTOLIC BLOOD PRESSURE: 132 MMHG | RESPIRATION RATE: 15 BRPM | OXYGEN SATURATION: 98 % | HEART RATE: 52 BPM | HEIGHT: 72 IN | DIASTOLIC BLOOD PRESSURE: 60 MMHG | WEIGHT: 146 LBS | BODY MASS INDEX: 19.77 KG/M2

## 2022-03-16 VITALS
DIASTOLIC BLOOD PRESSURE: 66 MMHG | HEART RATE: 60 BPM | TEMPERATURE: 97.1 F | SYSTOLIC BLOOD PRESSURE: 132 MMHG | OXYGEN SATURATION: 99 % | RESPIRATION RATE: 18 BRPM

## 2022-03-16 DIAGNOSIS — I10 ESSENTIAL HYPERTENSION: ICD-10-CM

## 2022-03-16 DIAGNOSIS — Z95.2 HX OF AORTIC VALVE REPLACEMENT, MECHANICAL: ICD-10-CM

## 2022-03-16 DIAGNOSIS — A04.72 C. DIFFICILE COLITIS: ICD-10-CM

## 2022-03-16 DIAGNOSIS — I48.21 PERMANENT ATRIAL FIBRILLATION: Primary | ICD-10-CM

## 2022-03-16 PROCEDURE — 99214 OFFICE O/P EST MOD 30 MIN: CPT | Performed by: NURSE PRACTITIONER

## 2022-03-16 RX ORDER — METOPROLOL SUCCINATE 25 MG/1
12.5 TABLET, EXTENDED RELEASE ORAL DAILY
Qty: 45 TABLET | Refills: 3 | Status: ON HOLD | OUTPATIENT
Start: 2022-03-16 | End: 2022-05-18

## 2022-03-16 NOTE — PROGRESS NOTES
"    CARDIOLOGY        Patient Name: Francisco Sequeira  :1935  Age: 86 y.o.  Primary Cardiologist: Griffin Fried MD  Encounter Provider:  LIZA Gonzáles    Date of Service: 21          CHIEF COMPLAINT / REASON FOR OFFICE VISIT     Atrial Fibrillation (BHE C.DIFF F/U)      HISTORY OF PRESENT ILLNESS       HPI  Francisco Sequeira is a 86 y.o. male who presents today for hospital reevaluation.     Pt has a  history significant for cardiac arrest, bicuspid aortic valve, atrial fibrillation, hyperlipidemia, diabetes, ascending aortic aneurysm.    Review of medical records reveals patient hospitalized -3/2/2022 with C. difficile colitis.  During patient's prolonged hospitalization he was followed by our service for his history of mechanical aortic valve.  C. difficile colitis that was treated with vancomycin per infectious disease.  Patient was weak at the time of discharge and was discharged with home health as well as physical therapy.    Patient reports that he has done well since time of discharge.  States that he has not been evaluated by both PCP as well as his gastroenterologist.  From cardiovascular standpoint he is asymptomatic and denies chest pain, shortness of breath, dyspnea with exertion, orthopnea, palpitations, lightheadedness or near syncope, lower extremity edema, fatigue.  States that diarrhea has completely resolved.  He is anticoagulated with warfarin and not exhibiting any bleeding complications.  He is followed by the anticoagulation management clinic.    The following portions of the patient's history were reviewed and updated as appropriate: allergies, current medications, past family history, past medical history, past social history, past surgical history and problem list.      VITAL SIGNS     Visit Vitals  Ht 182.9 cm (72.01\")   Wt 66.2 kg (146 lb)   BMI 19.80 kg/m²         Wt Readings from Last 3 Encounters:   22 66.2 kg (146 lb)   03/15/22 66.6 kg (146 lb 14.4 " oz)   03/11/22 (!) 144 kg (317 lb 7.4 oz)     Body mass index is 19.8 kg/m².      REVIEW OF SYSTEMS   Review of Systems   Constitutional: Negative for chills, fever, malaise/fatigue, weight gain and weight loss.   Cardiovascular: Negative for dyspnea on exertion and leg swelling.   Respiratory: Negative for cough, snoring and wheezing.    Hematologic/Lymphatic: Negative for bleeding problem. Does not bruise/bleed easily.   Skin: Negative for color change.   Musculoskeletal: Negative for falls, joint pain and myalgias.   Gastrointestinal: Negative for melena.   Genitourinary: Negative for hematuria.   Neurological: Negative for excessive daytime sleepiness.   Psychiatric/Behavioral: Negative for depression. The patient is not nervous/anxious.            PHYSICAL EXAMINATION     Constitutional:       Appearance: Normal appearance. Well-developed.   Eyes:      Conjunctiva/sclera: Conjunctivae normal.   Neck:      Vascular: No carotid bruit.   Pulmonary:      Effort: Pulmonary effort is normal.      Breath sounds: Normal breath sounds.   Cardiovascular:      Normal rate. Regular rhythm. Normal S1. Normal S2.      Murmurs: There is no murmur.      No gallop. No click. Of mechanical aortic valve No rub.   Musculoskeletal: Normal range of motion. Skin:     General: Skin is warm and dry.   Neurological:      Mental Status: Alert and oriented to person, place, and time.      GCS: GCS eye subscore is 4. GCS verbal subscore is 5. GCS motor subscore is 6.   Psychiatric:         Speech: Speech normal.         Behavior: Behavior normal.         Thought Content: Thought content normal.         Judgment: Judgment normal.           REVIEWED DATA     Procedures    Cardiac Procedures:  1. Myocardial perfusion stress test 6/1/2017.  Normal myocardial perfusion study without evidence of ischemia.  2. Echocardiogram 1/13/2020.  LVEF 60%.  Mild LVH.  RV cavity is moderately dilated.  Mildly reduced RV systolic function.  PRAVEENA.  LAE.  Mild  to moderate tricuspid valve regurgitation.  Calculated RVSP 44.8 mmHg.  3. ABELARDO 1/16/2020.  LVEF 60%.  All LV wall segments contract normally.  LV wall thickness consistent with mild to moderate LVH.  Diastolic function not assessed on this study.  No evidence of LV mass or thrombus.  LAE.  Spontaneous echo contrast present.  Medium degree of spontaneous echo contrast in the left atrium.  Left atrial appendage has been ligated.  PRAVEENA.  Prosthetic aortic valve.  No significant aortic valve regurgitation.  Mild to moderate mitral valve regurgitation.  Tricuspid valve grossly normal.  4. Echocardiogram 10/24/2021.  LVEF 60%.  RV cavity mild to moderately dilated.  LAE.  PRAVEENA.  Mechanical aortic valve present with peak and mean gradients within defined limits.  Moderate tricuspid valve regurgitation.  Severe pulmonary hypertension.    Lipid Panel    Lipid Panel 10/24/21   Total Cholesterol 80   Triglycerides 57   HDL Cholesterol 38 (A)   VLDL Cholesterol 14   LDL Cholesterol  28   LDL/HDL Ratio 0.81   (A) Abnormal value            BUN   Date Value Ref Range Status   03/02/2022 31 (H) 8 - 23 mg/dL Final     Creatinine   Date Value Ref Range Status   03/02/2022 1.21 0.76 - 1.27 mg/dL Final     Potassium   Date Value Ref Range Status   03/02/2022 4.1 3.5 - 5.2 mmol/L Final     ALT (SGPT)   Date Value Ref Range Status   02/16/2022 11 1 - 41 U/L Final     AST (SGOT)   Date Value Ref Range Status   02/16/2022 12 1 - 40 U/L Final           ASSESSMENT & PLAN      Diagnosis Plan   1. Permanent atrial fibrillation (HCC)     2. Essential hypertension     3. Hx of aortic valve replacement, mechanical [Z95.2]     4. C. difficile colitis           SUMMARY/DISCUSSION  1. Permanent A. Fib.  Heart rate controlled in the mid to upper 50s.  Patient denies lightheadedness or near syncope.  Anticoagulated with warfarin and follows the anticoagulation management clinic.  Denies any rectal bleeding.  2. Hypertension.  Blood pressure controlled  at 132/60.  Continue furosemide 40 mg/day and metoprolol succinate 12.5 mg/day.  3. Mechanical AVR.  Click of mechanical valve.  Anticoagulated with warfarin.  Follows anticoagulation management clinic.  4. C. difficile colitis.  Evaluated by GI yesterday.  Diarrhea resolved.  5. Keep previously scheduled appointment with Dr. Fried in August 2022.  Please call the office sooner for problems or complications.        MEDICATIONS         Discharge Medications          Accurate as of March 16, 2022  8:57 AM. If you have any questions, ask your nurse or doctor.            Continue These Medications      Instructions Start Date   aspirin 81 MG EC tablet   81 mg, Oral, Daily      atorvastatin 40 MG tablet  Commonly known as: LIPITOR   40 mg, Oral, Daily      colestipol 1 g tablet  Commonly known as: Colestid   1 g, Oral, 2 Times Daily      digoxin 125 MCG tablet  Commonly known as: LANOXIN   TAKE ONE TABLET BY MOUTH DAILY      diphenhydrAMINE 25 mg capsule  Commonly known as: BENADRYL   25 mg, Oral, 2 Times Daily PRN      famotidine 20 MG tablet  Commonly known as: PEPCID   20 mg, Oral, 2 Times Daily Before Meals      ferrous gluconate 324 MG tablet  Commonly known as: FERGON   324 mg, Oral, Every Other Day      fish oil 1000 MG capsule capsule   4,000 mg, Oral, Daily With Breakfast      FreeStyle Vance 14 Day [place] device   1 application, Does not apply, Every 14 Days      FreeStyle Vance 14 Day Sensor misc   1 application, Does not apply, Every 14 Days      furosemide 40 MG tablet  Commonly known as: Lasix   40 mg, Oral, Daily      insulin lispro 100 UNIT/ML injection  Commonly known as: humaLOG   USE AS DIRECTED WITH V-GO PUMP      [employer] Autolet Lancing Device misc   1 each, Does not apply, 3 Times Daily      [employer] Blood Glucose kit   1 each, Does not apply, 3 Times Daily      [employer] Blood Glucose Test test strip  Generic drug: glucose blood   1 each, Other, 3 Times Daily      lactobacillus acidophilus capsule  capsule   1 capsule, Oral, Daily      magnesium oxide 400 MG tablet  Commonly known as: MAG-OX   400 mg, Oral, 2 Times Daily      metoprolol succinate XL 25 MG 24 hr tablet  Commonly known as: TOPROL-XL   12.5 mg, Oral, Daily      Pen Needles 32G X 4 MM misc   1 each, Does not apply, Daily PRN, Use with Tresiba       polycarbophil 625 MG tablet tablet   1,250 mg, Oral, 2 Times Daily With Meals      saccharomyces boulardii 250 MG capsule  Commonly known as: FLORASTOR   250 mg, Oral, 2 Times Daily      sodium bicarbonate 650 MG tablet   1,300 mg, Oral, 3 Times Daily      TRESIBA FLEXTOUCH SC   30-65 Units, Subcutaneous, Daily PRN, Use as directed if blood sugar gets too high       V-Go 40 kit   USE AS DIRECTED THREE TIMES A DAY      vancomycin 125 MG capsule  Commonly known as: VANCOCIN   125 mg, Oral, Every 24 Hours      vancomycin 125 MG capsule  Commonly known as: VANCOCIN   125 mg, Oral, Every Other Day   Start Date: March 19, 2022     warfarin 5 MG tablet  Commonly known as: COUMADIN   TAKE 1 AND 1/2 TABLET BY MOUTH DAILY ON MONDAY, WEDNESDAY, AND FRIDAY.  TAKE ONE TABLET BY MOUTH DAILY ON ALL OTHER DAYS OF THE WEEK                 **Dragon Disclaimer:   Much of this encounter note is an electronic transcription/translation of spoken language to printed text. The electronic translation of spoken language may permit erroneous, or at times, nonsensical words or phrases to be inadvertently transcribed. Although I have reviewed the note for such errors, some may still exist.

## 2022-03-17 ENCOUNTER — HOME CARE VISIT (OUTPATIENT)
Dept: HOME HEALTH SERVICES | Facility: HOME HEALTHCARE | Age: 87
End: 2022-03-17

## 2022-03-17 VITALS
RESPIRATION RATE: 18 BRPM | DIASTOLIC BLOOD PRESSURE: 68 MMHG | TEMPERATURE: 97.5 F | SYSTOLIC BLOOD PRESSURE: 136 MMHG | OXYGEN SATURATION: 96 % | HEART RATE: 66 BPM

## 2022-03-17 PROCEDURE — G0180 MD CERTIFICATION HHA PATIENT: HCPCS | Performed by: NURSE PRACTITIONER

## 2022-03-17 PROCEDURE — G0151 HHCP-SERV OF PT,EA 15 MIN: HCPCS

## 2022-03-17 NOTE — HOME HEALTH
"Subjective: \"I'm a little stronger everyday now.\"    Falls reported: none    Medication changes: none      Plan for next visit: gait training progressing to cane, reinstruct HEP adding further balance activities as tolerated, and patient education as needed"

## 2022-03-18 ENCOUNTER — ANTICOAGULATION VISIT (OUTPATIENT)
Dept: PHARMACY | Facility: HOSPITAL | Age: 87
End: 2022-03-18

## 2022-03-18 DIAGNOSIS — Z95.2 HX OF AORTIC VALVE REPLACEMENT, MECHANICAL: Primary | ICD-10-CM

## 2022-03-18 DIAGNOSIS — I63.411 CEREBROVASCULAR ACCIDENT (CVA) DUE TO EMBOLISM OF RIGHT MIDDLE CEREBRAL ARTERY: ICD-10-CM

## 2022-03-18 LAB — INR PPP: 3.5

## 2022-03-18 NOTE — PROGRESS NOTES
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    INR History:  Anticoagulation Monitoring 3/4/2022 3/11/2022 3/18/2022   INR 3.40 3.20 3.50   INR Date 3/4/2022 3/11/2022 3/18/2022   INR Goal 3.0-3.5 3.0-3.5 3.0-3.5   Trend Same Same Same   Last Week Total 42.5 mg 42.5 mg 42.5 mg   Next Week Total 42.5 mg 42.5 mg 42.5 mg   Sun 5 mg 5 mg 5 mg   Mon 7.5 mg 7.5 mg 7.5 mg   Tue 5 mg 5 mg 5 mg   Wed 7.5 mg 7.5 mg 7.5 mg   Thu 5 mg 5 mg 5 mg   Fri 7.5 mg 7.5 mg 7.5 mg   Sat 5 mg 5 mg 5 mg   Visit Report - - -   Some recent data might be hidden       Plan:  1. INR is therapeutic today- see above in Anticoagulation Summary.    Francisco Sequeira to continue their warfarin regimen- see above in Anticoagulation Summary.  2. Follow up in 1 week  3. Pt has agreed to only be called if INR out of range. They have been instructed to call if any changes in medications, doses,  concerns, etc. Patient expresses understanding and has no further questions at this time.    Shannon Flor, Pharmacy Technician

## 2022-03-21 ENCOUNTER — OFFICE VISIT (OUTPATIENT)
Dept: FAMILY MEDICINE CLINIC | Facility: CLINIC | Age: 87
End: 2022-03-21

## 2022-03-21 VITALS
WEIGHT: 146 LBS | HEART RATE: 54 BPM | DIASTOLIC BLOOD PRESSURE: 60 MMHG | SYSTOLIC BLOOD PRESSURE: 126 MMHG | HEIGHT: 72 IN | BODY MASS INDEX: 19.77 KG/M2 | OXYGEN SATURATION: 100 % | TEMPERATURE: 96.6 F

## 2022-03-21 DIAGNOSIS — E78.5 HYPERLIPIDEMIA, UNSPECIFIED HYPERLIPIDEMIA TYPE: Chronic | ICD-10-CM

## 2022-03-21 DIAGNOSIS — E11.69 TYPE 2 DIABETES MELLITUS WITH OTHER SPECIFIED COMPLICATION, UNSPECIFIED WHETHER LONG TERM INSULIN USE: ICD-10-CM

## 2022-03-21 DIAGNOSIS — I10 PRIMARY HYPERTENSION: Primary | Chronic | ICD-10-CM

## 2022-03-21 DIAGNOSIS — I48.21 PERMANENT ATRIAL FIBRILLATION: ICD-10-CM

## 2022-03-21 DIAGNOSIS — K21.9 GASTROESOPHAGEAL REFLUX DISEASE WITHOUT ESOPHAGITIS: Chronic | ICD-10-CM

## 2022-03-21 PROBLEM — J90 PLEURAL EFFUSION: Status: RESOLVED | Noted: 2021-11-02 | Resolved: 2022-03-21

## 2022-03-21 PROBLEM — Z00.00 MEDICARE ANNUAL WELLNESS VISIT, SUBSEQUENT: Status: RESOLVED | Noted: 2017-10-30 | Resolved: 2022-03-21

## 2022-03-21 PROBLEM — A04.72 C. DIFFICILE COLITIS: Status: RESOLVED | Noted: 2022-02-15 | Resolved: 2022-03-21

## 2022-03-21 PROBLEM — Z90.5 HISTORY OF NEPHRECTOMY: Status: RESOLVED | Noted: 2021-11-02 | Resolved: 2022-03-21

## 2022-03-21 PROBLEM — R82.90 ABNORMAL URINALYSIS: Status: RESOLVED | Noted: 2021-11-02 | Resolved: 2022-03-21

## 2022-03-21 PROBLEM — D62 ACUTE POSTHEMORRHAGIC ANEMIA: Status: RESOLVED | Noted: 2021-11-05 | Resolved: 2022-03-21

## 2022-03-21 PROBLEM — K55.20 ANGIODYSPLASIA OF COLON WITHOUT HEMORRHAGE: Status: RESOLVED | Noted: 2021-11-10 | Resolved: 2022-03-21

## 2022-03-21 PROBLEM — Z95.2 HX OF AORTIC VALVE REPLACEMENT, MECHANICAL: Status: RESOLVED | Noted: 2018-09-19 | Resolved: 2022-03-21

## 2022-03-21 PROBLEM — Z09 HOSPITAL DISCHARGE FOLLOW-UP: Status: RESOLVED | Noted: 2021-11-19 | Resolved: 2022-03-21

## 2022-03-21 PROBLEM — K92.2 LOWER GI BLEED: Status: RESOLVED | Noted: 2021-11-02 | Resolved: 2022-03-21

## 2022-03-21 PROBLEM — N17.9 AKI (ACUTE KIDNEY INJURY): Status: RESOLVED | Noted: 2020-01-14 | Resolved: 2022-03-21

## 2022-03-21 LAB
EXPIRATION DATE: 2023
HBA1C MFR BLD: 6.7 %
Lab: NORMAL

## 2022-03-21 PROCEDURE — 83036 HEMOGLOBIN GLYCOSYLATED A1C: CPT | Performed by: NURSE PRACTITIONER

## 2022-03-21 PROCEDURE — 3044F HG A1C LEVEL LT 7.0%: CPT | Performed by: NURSE PRACTITIONER

## 2022-03-21 PROCEDURE — 99214 OFFICE O/P EST MOD 30 MIN: CPT | Performed by: NURSE PRACTITIONER

## 2022-03-21 NOTE — PROGRESS NOTES
"Chief Complaint  Diabetes (A1C check )    Subjective          Francisco Sequeira presents to [facility]  History of Present Illness  Hypertension-patient is currently on metoprolol XL 12.5 mg a day as directed without any side effects.  Blood pressures well controlled in the office today.    Diabetes-A1c was last checked in October 2021 with a result of 5.2.  Today in office it is 6.7.  Is currently on a V-Go pump and taking the appropriate amount of clicks with meals and is able to alternate how many clicks he takes about what he eats and what his blood sugar is.  He has been on this method for quite some time and well controlled.    Hyperlipidemia-patient is currently on atorvastatin 40 mg daily as directed by side effects.  Cholesterol was last checked in October 2021 with a total cholesterol of 80 and an LDL of 28.    Afib- sees cardio on meds    cdiff- sees GI in 6 weeks.  DOng well with meds and no diarrhea now.  Still on Vanc  Objective   Vital Signs:   /60   Pulse 54   Temp 96.6 °F (35.9 °C)   Ht 182.9 cm (72\")   Wt 66.2 kg (146 lb)   SpO2 100%   BMI 19.80 kg/m²     Physical Exam  Vitals reviewed.   Constitutional:       General: He is not in acute distress.     Appearance: He is well-developed.   HENT:      Head: Normocephalic.   Cardiovascular:      Rate and Rhythm: Normal rate and regular rhythm.      Heart sounds: Normal heart sounds.   Pulmonary:      Effort: Pulmonary effort is normal.      Breath sounds: Normal breath sounds.   Neurological:      Mental Status: He is alert and oriented to person, place, and time.      Gait: Gait normal.   Psychiatric:         Behavior: Behavior normal.         Thought Content: Thought content normal.         Judgment: Judgment normal.        Result Review :   The following data was reviewed by: LIZA Trinh on 03/21/2022:  CMP    CMP 2/28/22 3/1/22 3/2/22   Glucose 136 (A) 144 (A) 167 (A)   BUN 26 (A) 28 (A) 31 (A) "   Creatinine 1.33 (A) 1.23 1.21   eGFR Non African Am 51 (A)     Sodium 142 139 142   Potassium 3.9 3.9 4.1   Chloride 104 100 105   Calcium 8.4 (A) 8.3 (A) 8.8   Albumin 2.20 (A) 2.60 (A) 2.60 (A)   (A) Abnormal value            CBC w/diff    CBC w/Diff 2/28/22 3/1/22 3/2/22   WBC 6.45 6.08 5.61   RBC 4.61 4.61 4.64   Hemoglobin 11.8 (A) 11.7 (A) 12.0 (A)   Hematocrit 37.4 (A) 38.1 38.2   MCV 81.1 82.6 82.3   MCH 25.6 (A) 25.4 (A) 25.9 (A)   MCHC 31.6 30.7 (A) 31.4 (A)   RDW 15.3 15.6 (A) 15.7 (A)   Platelets 165 162 155   Neutrophil Rel % 71.6 72.8 70.3   Immature Granulocyte Rel % 0.3 0.3 0.0   Lymphocyte Rel % 17.1 (A) 15.1 (A) 17.6 (A)   Monocyte Rel % 7.6 7.9 7.8   Eosinophil Rel % 2.8 3.1 3.6   Basophil Rel % 0.6 0.8 0.7   (A) Abnormal value            Lipid Panel    Lipid Panel 10/24/21   Total Cholesterol 80   Triglycerides 57   HDL Cholesterol 38 (A)   VLDL Cholesterol 14   LDL Cholesterol  28   LDL/HDL Ratio 0.81   (A) Abnormal value                Most Recent A1C    HGBA1C Most Recent 3/21/22   Hemoglobin A1C 6.7                         Assessment and Plan    Diagnoses and all orders for this visit:    1. Primary hypertension (Primary)    2. Gastroesophageal reflux disease without esophagitis    3. Hyperlipidemia, unspecified hyperlipidemia type    4. Type 2 diabetes mellitus with other specified complication, unspecified whether long term insulin use (HCC)  -     POC Glycosylated Hemoglobin (Hb A1C)    5. Permanent atrial fibrillation (HCC)        Follow Up   Return in about 6 months (around 9/21/2022) for Recheck.  Patient was given instructions and counseling regarding his condition or for health maintenance advice. Please see specific information pulled into the AVS if appropriate.     Cont same with VGO and all other meds  rto in 6 mons       Mask and googles worn

## 2022-03-22 ENCOUNTER — HOME CARE VISIT (OUTPATIENT)
Dept: HOME HEALTH SERVICES | Facility: HOME HEALTHCARE | Age: 87
End: 2022-03-22

## 2022-03-22 VITALS
DIASTOLIC BLOOD PRESSURE: 76 MMHG | OXYGEN SATURATION: 99 % | SYSTOLIC BLOOD PRESSURE: 130 MMHG | TEMPERATURE: 97.5 F | RESPIRATION RATE: 18 BRPM | HEART RATE: 66 BPM

## 2022-03-22 VITALS
TEMPERATURE: 98.4 F | RESPIRATION RATE: 20 BRPM | HEART RATE: 68 BPM | DIASTOLIC BLOOD PRESSURE: 80 MMHG | OXYGEN SATURATION: 98 % | SYSTOLIC BLOOD PRESSURE: 128 MMHG

## 2022-03-22 PROCEDURE — G0151 HHCP-SERV OF PT,EA 15 MIN: HCPCS

## 2022-03-22 PROCEDURE — G0299 HHS/HOSPICE OF RN EA 15 MIN: HCPCS

## 2022-03-22 NOTE — HOME HEALTH
"Subjective: \"I turned too fast with the walker over the weekend and lost my balance so I do need to be more careful.\"    Falls reported: none    Medication changes: none      Plan for next visit: Continue gait training with rolling walker and cane as tolerated, HEP upgrade instruction for further balance/strengthening, and patient spouse education as needed"

## 2022-03-22 NOTE — HOME HEALTH
all goals met, d/c from nursing .  D/C INSTRUCTIONS COMPLETED,   [facility] FORM IN FOLDER SIGNED FOR PT   THEY ARE STILL WORKING WITH PATIENT.

## 2022-03-22 NOTE — CASE COMMUNICATION
PATIENT D/C FROM NURSING AS ALL GOALS HAVE BEEN MET.   HE IS DOING WELL , TAKING MEDS AS PRESCRIBED, NO ISSUES   T/I TO CALL MD IF ANY CHANGES.

## 2022-03-23 ENCOUNTER — APPOINTMENT (OUTPATIENT)
Dept: GENERAL RADIOLOGY | Facility: HOSPITAL | Age: 87
End: 2022-03-23

## 2022-03-23 ENCOUNTER — READMISSION MANAGEMENT (OUTPATIENT)
Dept: CALL CENTER | Facility: HOSPITAL | Age: 87
End: 2022-03-23

## 2022-03-23 ENCOUNTER — APPOINTMENT (OUTPATIENT)
Dept: ULTRASOUND IMAGING | Facility: HOSPITAL | Age: 87
End: 2022-03-23

## 2022-03-23 ENCOUNTER — HOME CARE VISIT (OUTPATIENT)
Dept: HOME HEALTH SERVICES | Facility: HOME HEALTHCARE | Age: 87
End: 2022-03-23

## 2022-03-23 ENCOUNTER — HOSPITAL ENCOUNTER (INPATIENT)
Facility: HOSPITAL | Age: 87
LOS: 12 days | Discharge: HOME | End: 2022-04-04
Attending: EMERGENCY MEDICINE | Admitting: INTERNAL MEDICINE

## 2022-03-23 ENCOUNTER — APPOINTMENT (OUTPATIENT)
Dept: CT IMAGING | Facility: HOSPITAL | Age: 87
End: 2022-03-23

## 2022-03-23 DIAGNOSIS — Z74.09 IMMOBILITY: ICD-10-CM

## 2022-03-23 DIAGNOSIS — K80.01 CALCULUS OF GALLBLADDER WITH ACUTE CHOLECYSTITIS AND OBSTRUCTION: ICD-10-CM

## 2022-03-23 DIAGNOSIS — A41.9 SEPSIS, DUE TO UNSPECIFIED ORGANISM, UNSPECIFIED WHETHER ACUTE ORGAN DYSFUNCTION PRESENT: Primary | ICD-10-CM

## 2022-03-23 DIAGNOSIS — R11.2 NAUSEA AND VOMITING IN ADULT: ICD-10-CM

## 2022-03-23 DIAGNOSIS — Z79.01 CHRONIC ANTICOAGULATION: ICD-10-CM

## 2022-03-23 DIAGNOSIS — I10 PRIMARY HYPERTENSION: Chronic | ICD-10-CM

## 2022-03-23 DIAGNOSIS — R10.9 ABDOMINAL PAIN, ACUTE: ICD-10-CM

## 2022-03-23 DIAGNOSIS — R50.9 FEVER IN ADULT: ICD-10-CM

## 2022-03-23 PROBLEM — Z86.19 HISTORY OF CLOSTRIDIUM DIFFICILE INFECTION: Status: ACTIVE | Noted: 2022-03-23

## 2022-03-23 PROBLEM — K80.00 CALCULUS OF GALLBLADDER WITH ACUTE CHOLECYSTITIS WITHOUT OBSTRUCTION: Status: ACTIVE | Noted: 2022-03-23

## 2022-03-23 PROBLEM — J69.0 ASPIRATION PNEUMONITIS: Status: ACTIVE | Noted: 2022-03-23

## 2022-03-23 LAB
ALBUMIN SERPL-MCNC: 4 G/DL (ref 3.5–5.2)
ALBUMIN/GLOB SERPL: 1.2 G/DL
ALP SERPL-CCNC: 138 U/L (ref 39–117)
ALT SERPL W P-5'-P-CCNC: 18 U/L (ref 1–41)
ANION GAP SERPL CALCULATED.3IONS-SCNC: 13 MMOL/L (ref 5–15)
AST SERPL-CCNC: 17 U/L (ref 1–40)
B PARAPERT DNA SPEC QL NAA+PROBE: NOT DETECTED
B PERT DNA SPEC QL NAA+PROBE: NOT DETECTED
BACTERIA UR QL AUTO: NORMAL /HPF
BASOPHILS # BLD AUTO: 0.04 10*3/MM3 (ref 0–0.2)
BASOPHILS NFR BLD AUTO: 0.3 % (ref 0–1.5)
BILIRUB SERPL-MCNC: 0.9 MG/DL (ref 0–1.2)
BILIRUB UR QL STRIP: NEGATIVE
BUN SERPL-MCNC: 30 MG/DL (ref 8–23)
BUN/CREAT SERPL: 18.8 (ref 7–25)
C PNEUM DNA NPH QL NAA+NON-PROBE: NOT DETECTED
CALCIUM SPEC-SCNC: 9.1 MG/DL (ref 8.6–10.5)
CHLORIDE SERPL-SCNC: 100 MMOL/L (ref 98–107)
CLARITY UR: CLEAR
CO2 SERPL-SCNC: 29 MMOL/L (ref 22–29)
COLOR UR: YELLOW
CREAT SERPL-MCNC: 1.6 MG/DL (ref 0.76–1.27)
D-LACTATE SERPL-SCNC: 1.7 MMOL/L (ref 0.5–2)
D-LACTATE SERPL-SCNC: 4.9 MMOL/L (ref 0.5–2)
DEPRECATED RDW RBC AUTO: 45.8 FL (ref 37–54)
DIGOXIN SERPL-MCNC: 1.3 NG/ML (ref 0.6–1.2)
EGFRCR SERPLBLD CKD-EPI 2021: 42.2 ML/MIN/1.73
EOSINOPHIL # BLD AUTO: 0.02 10*3/MM3 (ref 0–0.4)
EOSINOPHIL NFR BLD AUTO: 0.2 % (ref 0.3–6.2)
ERYTHROCYTE [DISTWIDTH] IN BLOOD BY AUTOMATED COUNT: 15.6 % (ref 12.3–15.4)
FLUAV SUBTYP SPEC NAA+PROBE: NOT DETECTED
FLUBV RNA ISLT QL NAA+PROBE: NOT DETECTED
GLOBULIN UR ELPH-MCNC: 3.4 GM/DL
GLUCOSE BLDC GLUCOMTR-MCNC: 120 MG/DL (ref 70–130)
GLUCOSE BLDC GLUCOMTR-MCNC: 158 MG/DL (ref 70–130)
GLUCOSE BLDC GLUCOMTR-MCNC: 198 MG/DL (ref 70–130)
GLUCOSE SERPL-MCNC: 193 MG/DL (ref 65–99)
GLUCOSE UR STRIP-MCNC: NEGATIVE MG/DL
HADV DNA SPEC NAA+PROBE: NOT DETECTED
HCOV 229E RNA SPEC QL NAA+PROBE: NOT DETECTED
HCOV HKU1 RNA SPEC QL NAA+PROBE: NOT DETECTED
HCOV NL63 RNA SPEC QL NAA+PROBE: NOT DETECTED
HCOV OC43 RNA SPEC QL NAA+PROBE: NOT DETECTED
HCT VFR BLD AUTO: 38.6 % (ref 37.5–51)
HGB BLD-MCNC: 12.3 G/DL (ref 13–17.7)
HGB UR QL STRIP.AUTO: NEGATIVE
HMPV RNA NPH QL NAA+NON-PROBE: NOT DETECTED
HOLD SPECIMEN: NORMAL
HOLD SPECIMEN: NORMAL
HPIV1 RNA ISLT QL NAA+PROBE: NOT DETECTED
HPIV2 RNA SPEC QL NAA+PROBE: NOT DETECTED
HPIV3 RNA NPH QL NAA+PROBE: NOT DETECTED
HPIV4 P GENE NPH QL NAA+PROBE: NOT DETECTED
HYALINE CASTS UR QL AUTO: NORMAL /LPF
IMM GRANULOCYTES # BLD AUTO: 0.03 10*3/MM3 (ref 0–0.05)
IMM GRANULOCYTES NFR BLD AUTO: 0.2 % (ref 0–0.5)
INR PPP: 2.56 (ref 0.9–1.1)
KETONES UR QL STRIP: NEGATIVE
L PNEUMO1 AG UR QL IA: NEGATIVE
LEUKOCYTE ESTERASE UR QL STRIP.AUTO: NEGATIVE
LIPASE SERPL-CCNC: 18 U/L (ref 13–60)
LYMPHOCYTES # BLD AUTO: 0.5 10*3/MM3 (ref 0.7–3.1)
LYMPHOCYTES NFR BLD AUTO: 4 % (ref 19.6–45.3)
M PNEUMO IGG SER IA-ACNC: NOT DETECTED
MCH RBC QN AUTO: 25.7 PG (ref 26.6–33)
MCHC RBC AUTO-ENTMCNC: 31.9 G/DL (ref 31.5–35.7)
MCV RBC AUTO: 80.8 FL (ref 79–97)
MONOCYTES # BLD AUTO: 0.69 10*3/MM3 (ref 0.1–0.9)
MONOCYTES NFR BLD AUTO: 5.5 % (ref 5–12)
MRSA DNA SPEC QL NAA+PROBE: ABNORMAL
NEUTROPHILS NFR BLD AUTO: 11.37 10*3/MM3 (ref 1.7–7)
NEUTROPHILS NFR BLD AUTO: 89.8 % (ref 42.7–76)
NITRITE UR QL STRIP: NEGATIVE
NRBC BLD AUTO-RTO: 0 /100 WBC (ref 0–0.2)
PH UR STRIP.AUTO: 8.5 [PH] (ref 5–8)
PLATELET # BLD AUTO: 175 10*3/MM3 (ref 140–450)
PMV BLD AUTO: 11.6 FL (ref 6–12)
POTASSIUM SERPL-SCNC: 4.9 MMOL/L (ref 3.5–5.2)
PROCALCITONIN SERPL-MCNC: 0.22 NG/ML (ref 0–0.25)
PROT SERPL-MCNC: 7.4 G/DL (ref 6–8.5)
PROT UR QL STRIP: ABNORMAL
PROTHROMBIN TIME: 27.6 SECONDS (ref 11.7–14.2)
QT INTERVAL: 358 MS
RBC # BLD AUTO: 4.78 10*6/MM3 (ref 4.14–5.8)
RBC # UR STRIP: NORMAL /HPF
REF LAB TEST METHOD: NORMAL
RHINOVIRUS RNA SPEC NAA+PROBE: NOT DETECTED
RSV RNA NPH QL NAA+NON-PROBE: NOT DETECTED
S PNEUM AG SPEC QL LA: NEGATIVE
SARS-COV-2 RNA NPH QL NAA+NON-PROBE: NOT DETECTED
SODIUM SERPL-SCNC: 142 MMOL/L (ref 136–145)
SP GR UR STRIP: >1.03 (ref 1–1.03)
SQUAMOUS #/AREA URNS HPF: NORMAL /HPF
UROBILINOGEN UR QL STRIP: ABNORMAL
WBC # UR STRIP: NORMAL /HPF
WBC NRBC COR # BLD: 12.65 10*3/MM3 (ref 3.4–10.8)
WHOLE BLOOD HOLD SPECIMEN: NORMAL
WHOLE BLOOD HOLD SPECIMEN: NORMAL

## 2022-03-23 PROCEDURE — 93010 ELECTROCARDIOGRAM REPORT: CPT | Performed by: INTERNAL MEDICINE

## 2022-03-23 PROCEDURE — 99222 1ST HOSP IP/OBS MODERATE 55: CPT | Performed by: NURSE PRACTITIONER

## 2022-03-23 PROCEDURE — 87899 AGENT NOS ASSAY W/OPTIC: CPT | Performed by: INTERNAL MEDICINE

## 2022-03-23 PROCEDURE — 82962 GLUCOSE BLOOD TEST: CPT

## 2022-03-23 PROCEDURE — 99284 EMERGENCY DEPT VISIT MOD MDM: CPT

## 2022-03-23 PROCEDURE — 85610 PROTHROMBIN TIME: CPT | Performed by: NURSE PRACTITIONER

## 2022-03-23 PROCEDURE — 81001 URINALYSIS AUTO W/SCOPE: CPT | Performed by: NURSE PRACTITIONER

## 2022-03-23 PROCEDURE — 0202U NFCT DS 22 TRGT SARS-COV-2: CPT | Performed by: NURSE PRACTITIONER

## 2022-03-23 PROCEDURE — 71045 X-RAY EXAM CHEST 1 VIEW: CPT

## 2022-03-23 PROCEDURE — 83690 ASSAY OF LIPASE: CPT | Performed by: NURSE PRACTITIONER

## 2022-03-23 PROCEDURE — 99222 1ST HOSP IP/OBS MODERATE 55: CPT | Performed by: STUDENT IN AN ORGANIZED HEALTH CARE EDUCATION/TRAINING PROGRAM

## 2022-03-23 PROCEDURE — 93005 ELECTROCARDIOGRAM TRACING: CPT | Performed by: NURSE PRACTITIONER

## 2022-03-23 PROCEDURE — 94799 UNLISTED PULMONARY SVC/PX: CPT

## 2022-03-23 PROCEDURE — 25010000002 CEFTRIAXONE PER 250 MG: Performed by: NURSE PRACTITIONER

## 2022-03-23 PROCEDURE — 25010000002 IOPAMIDOL 61 % SOLUTION: Performed by: EMERGENCY MEDICINE

## 2022-03-23 PROCEDURE — 76705 ECHO EXAM OF ABDOMEN: CPT

## 2022-03-23 PROCEDURE — 83605 ASSAY OF LACTIC ACID: CPT | Performed by: NURSE PRACTITIONER

## 2022-03-23 PROCEDURE — 87641 MR-STAPH DNA AMP PROBE: CPT | Performed by: INTERNAL MEDICINE

## 2022-03-23 PROCEDURE — 85025 COMPLETE CBC W/AUTO DIFF WBC: CPT | Performed by: NURSE PRACTITIONER

## 2022-03-23 PROCEDURE — 80053 COMPREHEN METABOLIC PANEL: CPT | Performed by: NURSE PRACTITIONER

## 2022-03-23 PROCEDURE — 87040 BLOOD CULTURE FOR BACTERIA: CPT | Performed by: NURSE PRACTITIONER

## 2022-03-23 PROCEDURE — 25010000002 ONDANSETRON PER 1 MG: Performed by: NURSE PRACTITIONER

## 2022-03-23 PROCEDURE — 80162 ASSAY OF DIGOXIN TOTAL: CPT | Performed by: NURSE PRACTITIONER

## 2022-03-23 PROCEDURE — 84145 PROCALCITONIN (PCT): CPT | Performed by: NURSE PRACTITIONER

## 2022-03-23 PROCEDURE — 74177 CT ABD & PELVIS W/CONTRAST: CPT

## 2022-03-23 RX ORDER — INSULIN LISPRO 100 [IU]/ML
0-7 INJECTION, SOLUTION INTRAVENOUS; SUBCUTANEOUS
Status: DISCONTINUED | OUTPATIENT
Start: 2022-03-23 | End: 2022-04-01

## 2022-03-23 RX ORDER — SODIUM CHLORIDE 0.9 % (FLUSH) 0.9 %
10 SYRINGE (ML) INJECTION AS NEEDED
Status: DISCONTINUED | OUTPATIENT
Start: 2022-03-23 | End: 2022-04-04 | Stop reason: HOSPADM

## 2022-03-23 RX ORDER — WARFARIN SODIUM 5 MG/1
5 TABLET ORAL SEE ADMIN INSTRUCTIONS
COMMUNITY
End: 2022-04-04 | Stop reason: HOSPADM

## 2022-03-23 RX ORDER — SODIUM CHLORIDE 0.9 % (FLUSH) 0.9 %
10 SYRINGE (ML) INJECTION EVERY 12 HOURS SCHEDULED
Status: DISCONTINUED | OUTPATIENT
Start: 2022-03-23 | End: 2022-04-04 | Stop reason: HOSPADM

## 2022-03-23 RX ORDER — DIPHENHYDRAMINE HCL 25 MG
25 CAPSULE ORAL 2 TIMES DAILY PRN
Status: DISCONTINUED | OUTPATIENT
Start: 2022-03-23 | End: 2022-04-04 | Stop reason: HOSPADM

## 2022-03-23 RX ORDER — MAGNESIUM OXIDE 400 MG/1
400 TABLET ORAL 2 TIMES DAILY
Status: DISCONTINUED | OUTPATIENT
Start: 2022-03-23 | End: 2022-04-04 | Stop reason: HOSPADM

## 2022-03-23 RX ORDER — VANCOMYCIN HYDROCHLORIDE 125 MG/1
125 CAPSULE ORAL EVERY OTHER DAY
Status: DISCONTINUED | OUTPATIENT
Start: 2022-03-23 | End: 2022-03-24

## 2022-03-23 RX ORDER — ACETAMINOPHEN 650 MG/1
650 SUPPOSITORY RECTAL EVERY 4 HOURS PRN
Status: DISCONTINUED | OUTPATIENT
Start: 2022-03-23 | End: 2022-04-04 | Stop reason: HOSPADM

## 2022-03-23 RX ORDER — GUAIFENESIN 600 MG/1
1200 TABLET, EXTENDED RELEASE ORAL EVERY 12 HOURS SCHEDULED
Status: DISCONTINUED | OUTPATIENT
Start: 2022-03-23 | End: 2022-04-04 | Stop reason: HOSPADM

## 2022-03-23 RX ORDER — METRONIDAZOLE 500 MG/100ML
500 INJECTION, SOLUTION INTRAVENOUS EVERY 8 HOURS
Status: COMPLETED | OUTPATIENT
Start: 2022-03-23 | End: 2022-03-30

## 2022-03-23 RX ORDER — MORPHINE SULFATE 2 MG/ML
2 INJECTION, SOLUTION INTRAMUSCULAR; INTRAVENOUS ONCE
Status: DISCONTINUED | OUTPATIENT
Start: 2022-03-23 | End: 2022-04-04 | Stop reason: HOSPADM

## 2022-03-23 RX ORDER — NITROGLYCERIN 0.4 MG/1
0.4 TABLET SUBLINGUAL
Status: DISCONTINUED | OUTPATIENT
Start: 2022-03-23 | End: 2022-04-04 | Stop reason: HOSPADM

## 2022-03-23 RX ORDER — NICOTINE POLACRILEX 4 MG
15 LOZENGE BUCCAL
Status: DISCONTINUED | OUTPATIENT
Start: 2022-03-23 | End: 2022-04-04 | Stop reason: HOSPADM

## 2022-03-23 RX ORDER — ACETAMINOPHEN 500 MG
1000 TABLET ORAL ONCE
Status: COMPLETED | OUTPATIENT
Start: 2022-03-23 | End: 2022-03-23

## 2022-03-23 RX ORDER — SODIUM CHLORIDE 9 MG/ML
100 INJECTION, SOLUTION INTRAVENOUS CONTINUOUS
Status: DISCONTINUED | OUTPATIENT
Start: 2022-03-23 | End: 2022-03-26

## 2022-03-23 RX ORDER — L.ACID,PARA/B.BIFIDUM/S.THERM 8B CELL
1 CAPSULE ORAL DAILY
Status: DISPENSED | OUTPATIENT
Start: 2022-03-24 | End: 2022-04-03

## 2022-03-23 RX ORDER — SODIUM BICARBONATE 650 MG/1
1300 TABLET ORAL 3 TIMES DAILY
Status: DISPENSED | OUTPATIENT
Start: 2022-03-23 | End: 2022-04-01

## 2022-03-23 RX ORDER — FAMOTIDINE 20 MG/1
20 TABLET, FILM COATED ORAL
Status: DISPENSED | OUTPATIENT
Start: 2022-03-23 | End: 2022-04-01

## 2022-03-23 RX ORDER — DIGOXIN 125 MCG
62.5 TABLET ORAL
Status: DISCONTINUED | OUTPATIENT
Start: 2022-03-24 | End: 2022-04-04 | Stop reason: HOSPADM

## 2022-03-23 RX ORDER — FERROUS SULFATE 325(65) MG
325 TABLET ORAL EVERY OTHER DAY
Status: DISCONTINUED | OUTPATIENT
Start: 2022-03-24 | End: 2022-04-04 | Stop reason: HOSPADM

## 2022-03-23 RX ORDER — ACETAMINOPHEN 160 MG/5ML
650 SOLUTION ORAL EVERY 4 HOURS PRN
Status: DISCONTINUED | OUTPATIENT
Start: 2022-03-23 | End: 2022-04-04 | Stop reason: HOSPADM

## 2022-03-23 RX ORDER — ONDANSETRON 2 MG/ML
4 INJECTION INTRAMUSCULAR; INTRAVENOUS EVERY 6 HOURS PRN
Status: DISCONTINUED | OUTPATIENT
Start: 2022-03-23 | End: 2022-04-04 | Stop reason: HOSPADM

## 2022-03-23 RX ORDER — ONDANSETRON 2 MG/ML
4 INJECTION INTRAMUSCULAR; INTRAVENOUS ONCE
Status: COMPLETED | OUTPATIENT
Start: 2022-03-23 | End: 2022-03-23

## 2022-03-23 RX ORDER — METRONIDAZOLE 500 MG/100ML
500 INJECTION, SOLUTION INTRAVENOUS ONCE
Status: DISCONTINUED | OUTPATIENT
Start: 2022-03-23 | End: 2022-03-24 | Stop reason: SDUPTHER

## 2022-03-23 RX ORDER — METOPROLOL SUCCINATE 25 MG/1
12.5 TABLET, EXTENDED RELEASE ORAL DAILY
Status: DISCONTINUED | OUTPATIENT
Start: 2022-03-24 | End: 2022-04-04 | Stop reason: HOSPADM

## 2022-03-23 RX ORDER — DEXTROSE MONOHYDRATE 25 G/50ML
25 INJECTION, SOLUTION INTRAVENOUS
Status: DISCONTINUED | OUTPATIENT
Start: 2022-03-23 | End: 2022-04-04 | Stop reason: HOSPADM

## 2022-03-23 RX ORDER — ACETAMINOPHEN 325 MG/1
650 TABLET ORAL EVERY 4 HOURS PRN
Status: DISCONTINUED | OUTPATIENT
Start: 2022-03-23 | End: 2022-04-04 | Stop reason: HOSPADM

## 2022-03-23 RX ADMIN — Medication 10 ML: at 17:35

## 2022-03-23 RX ADMIN — SODIUM CHLORIDE 2 G: 9 INJECTION, SOLUTION INTRAVENOUS at 13:53

## 2022-03-23 RX ADMIN — METRONIDAZOLE 500 MG: 500 INJECTION, SOLUTION INTRAVENOUS at 23:32

## 2022-03-23 RX ADMIN — Medication 10 ML: at 20:53

## 2022-03-23 RX ADMIN — ACETAMINOPHEN 1000 MG: 500 TABLET ORAL at 08:41

## 2022-03-23 RX ADMIN — SODIUM CHLORIDE 1000 ML: 9 INJECTION, SOLUTION INTRAVENOUS at 09:37

## 2022-03-23 RX ADMIN — METRONIDAZOLE 500 MG: 500 INJECTION, SOLUTION INTRAVENOUS at 17:35

## 2022-03-23 RX ADMIN — ONDANSETRON 4 MG: 2 INJECTION INTRAMUSCULAR; INTRAVENOUS at 08:35

## 2022-03-23 RX ADMIN — IOPAMIDOL 85 ML: 612 INJECTION, SOLUTION INTRAVENOUS at 09:59

## 2022-03-23 RX ADMIN — SODIUM CHLORIDE 100 ML/HR: 9 INJECTION, SOLUTION INTRAVENOUS at 15:58

## 2022-03-23 RX ADMIN — SODIUM CHLORIDE 1000 ML: 9 INJECTION, SOLUTION INTRAVENOUS at 08:34

## 2022-03-23 NOTE — OUTREACH NOTE
Medical Week 3 Survey    Flowsheet Row Responses   [facility] patient discharged from? [place]   Does the patient have one of the following disease processes/diagnoses(primary or secondary)? Other   Week 3 attempt successful? Yes   Call start time 0949   Call end time 0950   Discharge diagnosis HTN, GERD, C DIFF   Person spoke with today (if not patient) and relationship Gladys   Week 3 Call Completed? Yes   Wrap up additional comments Brief call pts wife was in ER with pt due to vomiting.           JAMEE JOHN - Registered Nurse

## 2022-03-24 ENCOUNTER — APPOINTMENT (OUTPATIENT)
Dept: NUCLEAR MEDICINE | Facility: HOSPITAL | Age: 87
End: 2022-03-24

## 2022-03-24 ENCOUNTER — READMISSION MANAGEMENT (OUTPATIENT)
Dept: CALL CENTER | Facility: HOSPITAL | Age: 87
End: 2022-03-24

## 2022-03-24 ENCOUNTER — TELEPHONE (OUTPATIENT)
Dept: FAMILY MEDICINE CLINIC | Facility: CLINIC | Age: 87
End: 2022-03-24

## 2022-03-24 LAB
ALBUMIN SERPL-MCNC: 2.9 G/DL (ref 3.5–5.2)
ALBUMIN/GLOB SERPL: 1 G/DL
ALP SERPL-CCNC: 113 U/L (ref 39–117)
ALT SERPL W P-5'-P-CCNC: 12 U/L (ref 1–41)
ANION GAP SERPL CALCULATED.3IONS-SCNC: 8 MMOL/L (ref 5–15)
AST SERPL-CCNC: 13 U/L (ref 1–40)
BILIRUB SERPL-MCNC: 0.6 MG/DL (ref 0–1.2)
BUN SERPL-MCNC: 26 MG/DL (ref 8–23)
BUN/CREAT SERPL: 17.4 (ref 7–25)
CALCIUM SPEC-SCNC: 7.9 MG/DL (ref 8.6–10.5)
CHLORIDE SERPL-SCNC: 110 MMOL/L (ref 98–107)
CO2 SERPL-SCNC: 26 MMOL/L (ref 22–29)
CREAT SERPL-MCNC: 1.49 MG/DL (ref 0.76–1.27)
DEPRECATED RDW RBC AUTO: 44.9 FL (ref 37–54)
EGFRCR SERPLBLD CKD-EPI 2021: 46 ML/MIN/1.73
ERYTHROCYTE [DISTWIDTH] IN BLOOD BY AUTOMATED COUNT: 15.7 % (ref 12.3–15.4)
GLOBULIN UR ELPH-MCNC: 3 GM/DL
GLUCOSE BLDC GLUCOMTR-MCNC: 109 MG/DL (ref 70–130)
GLUCOSE BLDC GLUCOMTR-MCNC: 126 MG/DL (ref 70–130)
GLUCOSE BLDC GLUCOMTR-MCNC: 131 MG/DL (ref 70–130)
GLUCOSE BLDC GLUCOMTR-MCNC: 139 MG/DL (ref 70–130)
GLUCOSE SERPL-MCNC: 126 MG/DL (ref 65–99)
HCT VFR BLD AUTO: 31.1 % (ref 37.5–51)
HGB BLD-MCNC: 10.1 G/DL (ref 13–17.7)
INR PPP: 3.52 (ref 0.9–1.1)
MCH RBC QN AUTO: 25.8 PG (ref 26.6–33)
MCHC RBC AUTO-ENTMCNC: 32.5 G/DL (ref 31.5–35.7)
MCV RBC AUTO: 79.5 FL (ref 79–97)
NT-PROBNP SERPL-MCNC: 4145 PG/ML (ref 0–1800)
PLATELET # BLD AUTO: 142 10*3/MM3 (ref 140–450)
PMV BLD AUTO: 12.2 FL (ref 6–12)
POTASSIUM SERPL-SCNC: 4.4 MMOL/L (ref 3.5–5.2)
PROCALCITONIN SERPL-MCNC: 2.81 NG/ML (ref 0–0.25)
PROT SERPL-MCNC: 5.9 G/DL (ref 6–8.5)
PROTHROMBIN TIME: 35.6 SECONDS (ref 11.7–14.2)
RBC # BLD AUTO: 3.91 10*6/MM3 (ref 4.14–5.8)
SODIUM SERPL-SCNC: 144 MMOL/L (ref 136–145)
WBC NRBC COR # BLD: 14.46 10*3/MM3 (ref 3.4–10.8)

## 2022-03-24 PROCEDURE — 36415 COLL VENOUS BLD VENIPUNCTURE: CPT | Performed by: NURSE PRACTITIONER

## 2022-03-24 PROCEDURE — 94799 UNLISTED PULMONARY SVC/PX: CPT

## 2022-03-24 PROCEDURE — 97530 THERAPEUTIC ACTIVITIES: CPT

## 2022-03-24 PROCEDURE — 85610 PROTHROMBIN TIME: CPT | Performed by: NURSE PRACTITIONER

## 2022-03-24 PROCEDURE — 25010000002 MORPHINE PER 10 MG: Performed by: RADIOLOGY

## 2022-03-24 PROCEDURE — 97162 PT EVAL MOD COMPLEX 30 MIN: CPT

## 2022-03-24 PROCEDURE — 99223 1ST HOSP IP/OBS HIGH 75: CPT | Performed by: STUDENT IN AN ORGANIZED HEALTH CARE EDUCATION/TRAINING PROGRAM

## 2022-03-24 PROCEDURE — 82962 GLUCOSE BLOOD TEST: CPT

## 2022-03-24 PROCEDURE — 85027 COMPLETE CBC AUTOMATED: CPT | Performed by: NURSE PRACTITIONER

## 2022-03-24 PROCEDURE — 94761 N-INVAS EAR/PLS OXIMETRY MLT: CPT

## 2022-03-24 PROCEDURE — 87070 CULTURE OTHR SPECIMN AEROBIC: CPT | Performed by: INTERNAL MEDICINE

## 2022-03-24 PROCEDURE — 99232 SBSQ HOSP IP/OBS MODERATE 35: CPT | Performed by: INTERNAL MEDICINE

## 2022-03-24 PROCEDURE — 97165 OT EVAL LOW COMPLEX 30 MIN: CPT

## 2022-03-24 PROCEDURE — 0 TECHNETIUM TC 99M MEBROFENIN KIT: Performed by: INTERNAL MEDICINE

## 2022-03-24 PROCEDURE — A9537 TC99M MEBROFENIN: HCPCS | Performed by: INTERNAL MEDICINE

## 2022-03-24 PROCEDURE — 78227 HEPATOBIL SYST IMAGE W/DRUG: CPT

## 2022-03-24 PROCEDURE — 25010000002 ENOXAPARIN PER 10 MG: Performed by: INTERNAL MEDICINE

## 2022-03-24 PROCEDURE — 83880 ASSAY OF NATRIURETIC PEPTIDE: CPT | Performed by: INTERNAL MEDICINE

## 2022-03-24 PROCEDURE — 80053 COMPREHEN METABOLIC PANEL: CPT | Performed by: NURSE PRACTITIONER

## 2022-03-24 PROCEDURE — 25010000002 CEFTRIAXONE PER 250 MG: Performed by: INTERNAL MEDICINE

## 2022-03-24 PROCEDURE — 84145 PROCALCITONIN (PCT): CPT | Performed by: INTERNAL MEDICINE

## 2022-03-24 PROCEDURE — 87205 SMEAR GRAM STAIN: CPT | Performed by: INTERNAL MEDICINE

## 2022-03-24 RX ORDER — KIT FOR THE PREPARATION OF TECHNETIUM TC 99M MEBROFENIN 45 MG/10ML
1 INJECTION, POWDER, LYOPHILIZED, FOR SOLUTION INTRAVENOUS
Status: COMPLETED | OUTPATIENT
Start: 2022-03-24 | End: 2022-03-24

## 2022-03-24 RX ORDER — MORPHINE SULFATE 2 MG/ML
2.6 INJECTION, SOLUTION INTRAMUSCULAR; INTRAVENOUS ONCE
Status: COMPLETED | OUTPATIENT
Start: 2022-03-24 | End: 2022-03-24

## 2022-03-24 RX ORDER — VANCOMYCIN HYDROCHLORIDE 125 MG/1
125 CAPSULE ORAL DAILY
Status: COMPLETED | OUTPATIENT
Start: 2022-03-25 | End: 2022-03-31

## 2022-03-24 RX ADMIN — FAMOTIDINE 20 MG: 20 TABLET ORAL at 16:41

## 2022-03-24 RX ADMIN — SODIUM CHLORIDE 100 ML/HR: 9 INJECTION, SOLUTION INTRAVENOUS at 11:04

## 2022-03-24 RX ADMIN — METRONIDAZOLE 500 MG: 500 INJECTION, SOLUTION INTRAVENOUS at 05:29

## 2022-03-24 RX ADMIN — Medication 1 CAPSULE: at 15:47

## 2022-03-24 RX ADMIN — SODIUM BICARBONATE 1300 MG: 650 TABLET ORAL at 15:49

## 2022-03-24 RX ADMIN — Medication 10 ML: at 21:09

## 2022-03-24 RX ADMIN — METOPROLOL SUCCINATE 12.5 MG: 25 TABLET, EXTENDED RELEASE ORAL at 15:50

## 2022-03-24 RX ADMIN — METRONIDAZOLE 500 MG: 500 INJECTION, SOLUTION INTRAVENOUS at 16:42

## 2022-03-24 RX ADMIN — GUAIFENESIN 1200 MG: 600 TABLET, EXTENDED RELEASE ORAL at 15:48

## 2022-03-24 RX ADMIN — MAGNESIUM OXIDE 400 MG (241.3 MG MAGNESIUM) TABLET 400 MG: TABLET at 15:51

## 2022-03-24 RX ADMIN — CEFTRIAXONE 1 G: 1 INJECTION, POWDER, FOR SOLUTION INTRAMUSCULAR; INTRAVENOUS at 15:44

## 2022-03-24 RX ADMIN — MAGNESIUM OXIDE 400 MG (241.3 MG MAGNESIUM) TABLET 400 MG: TABLET at 21:07

## 2022-03-24 RX ADMIN — SODIUM BICARBONATE 1300 MG: 650 TABLET ORAL at 21:07

## 2022-03-24 RX ADMIN — FERROUS SULFATE TAB 325 MG (65 MG ELEMENTAL FE) 325 MG: 325 (65 FE) TAB at 15:49

## 2022-03-24 RX ADMIN — ENOXAPARIN SODIUM 70 MG: 100 INJECTION SUBCUTANEOUS at 21:09

## 2022-03-24 RX ADMIN — GUAIFENESIN 1200 MG: 600 TABLET, EXTENDED RELEASE ORAL at 21:07

## 2022-03-24 RX ADMIN — MEBROFENIN 1 DOSE: 45 INJECTION, POWDER, LYOPHILIZED, FOR SOLUTION INTRAVENOUS at 11:05

## 2022-03-24 RX ADMIN — DIGOXIN 62.5 MCG: 125 TABLET ORAL at 15:49

## 2022-03-24 RX ADMIN — MORPHINE SULFATE 2.6 MG: 2 INJECTION, SOLUTION INTRAMUSCULAR; INTRAVENOUS at 12:50

## 2022-03-24 NOTE — OUTREACH NOTE
Medical Week 4 Survey    Flowsheet Row Responses   [facility] patient discharged from? [place]   Does the patient have one of the following disease processes/diagnoses(primary or secondary)? Other   Week 4 attempt successful? No   Revoke Readmitted          ELAINE INFANTE - Registered Nurse

## 2022-03-24 NOTE — TELEPHONE ENCOUNTER
Caller: NAVDEEP    Relationship: [facility]    Best call back number:    What orders are you requesting (i.e. lab or imaging): RESUME NURSING AND PHYSICAL THERAPY    In what timeframe would the patient need to come in:     Where will you receive your lab/imaging services:    Additional notes: NAVDEEP  IS CALLING IN TO GET ORDERS FOR PATIENTS [facility].

## 2022-03-25 LAB
ALBUMIN SERPL-MCNC: 2.7 G/DL (ref 3.5–5.2)
ALBUMIN/GLOB SERPL: 0.8 G/DL
ALP SERPL-CCNC: 125 U/L (ref 39–117)
ALT SERPL W P-5'-P-CCNC: 12 U/L (ref 1–41)
ANION GAP SERPL CALCULATED.3IONS-SCNC: 9.8 MMOL/L (ref 5–15)
AST SERPL-CCNC: 10 U/L (ref 1–40)
BILIRUB SERPL-MCNC: 0.8 MG/DL (ref 0–1.2)
BUN SERPL-MCNC: 25 MG/DL (ref 8–23)
BUN/CREAT SERPL: 20.3 (ref 7–25)
CALCIUM SPEC-SCNC: 8.2 MG/DL (ref 8.6–10.5)
CHLORIDE SERPL-SCNC: 113 MMOL/L (ref 98–107)
CO2 SERPL-SCNC: 22.2 MMOL/L (ref 22–29)
CREAT SERPL-MCNC: 1.23 MG/DL (ref 0.76–1.27)
DEPRECATED RDW RBC AUTO: 48 FL (ref 37–54)
EGFRCR SERPLBLD CKD-EPI 2021: 57.9 ML/MIN/1.73
ERYTHROCYTE [DISTWIDTH] IN BLOOD BY AUTOMATED COUNT: 16.3 % (ref 12.3–15.4)
GLOBULIN UR ELPH-MCNC: 3.2 GM/DL
GLUCOSE BLDC GLUCOMTR-MCNC: 103 MG/DL (ref 70–130)
GLUCOSE BLDC GLUCOMTR-MCNC: 125 MG/DL (ref 70–130)
GLUCOSE BLDC GLUCOMTR-MCNC: 131 MG/DL (ref 70–130)
GLUCOSE BLDC GLUCOMTR-MCNC: 179 MG/DL (ref 70–130)
GLUCOSE SERPL-MCNC: 105 MG/DL (ref 65–99)
HCT VFR BLD AUTO: 32.5 % (ref 37.5–51)
HGB BLD-MCNC: 10.2 G/DL (ref 13–17.7)
INR PPP: 2.41 (ref 0.9–1.1)
MCH RBC QN AUTO: 25.5 PG (ref 26.6–33)
MCHC RBC AUTO-ENTMCNC: 31.4 G/DL (ref 31.5–35.7)
MCV RBC AUTO: 81.3 FL (ref 79–97)
PLATELET # BLD AUTO: 145 10*3/MM3 (ref 140–450)
PMV BLD AUTO: 11.8 FL (ref 6–12)
POTASSIUM SERPL-SCNC: 4.1 MMOL/L (ref 3.5–5.2)
PROCALCITONIN SERPL-MCNC: 1.78 NG/ML (ref 0–0.25)
PROT SERPL-MCNC: 5.9 G/DL (ref 6–8.5)
PROTHROMBIN TIME: 26.3 SECONDS (ref 11.7–14.2)
RBC # BLD AUTO: 4 10*6/MM3 (ref 4.14–5.8)
SODIUM SERPL-SCNC: 145 MMOL/L (ref 136–145)
WBC NRBC COR # BLD: 15.84 10*3/MM3 (ref 3.4–10.8)

## 2022-03-25 PROCEDURE — 99232 SBSQ HOSP IP/OBS MODERATE 35: CPT | Performed by: STUDENT IN AN ORGANIZED HEALTH CARE EDUCATION/TRAINING PROGRAM

## 2022-03-25 PROCEDURE — 94799 UNLISTED PULMONARY SVC/PX: CPT

## 2022-03-25 PROCEDURE — 99233 SBSQ HOSP IP/OBS HIGH 50: CPT | Performed by: STUDENT IN AN ORGANIZED HEALTH CARE EDUCATION/TRAINING PROGRAM

## 2022-03-25 PROCEDURE — 94640 AIRWAY INHALATION TREATMENT: CPT

## 2022-03-25 PROCEDURE — 36415 COLL VENOUS BLD VENIPUNCTURE: CPT | Performed by: NURSE PRACTITIONER

## 2022-03-25 PROCEDURE — 80053 COMPREHEN METABOLIC PANEL: CPT | Performed by: NURSE PRACTITIONER

## 2022-03-25 PROCEDURE — 99232 SBSQ HOSP IP/OBS MODERATE 35: CPT | Performed by: NURSE PRACTITIONER

## 2022-03-25 PROCEDURE — 94761 N-INVAS EAR/PLS OXIMETRY MLT: CPT

## 2022-03-25 PROCEDURE — 92610 EVALUATE SWALLOWING FUNCTION: CPT

## 2022-03-25 PROCEDURE — 82962 GLUCOSE BLOOD TEST: CPT

## 2022-03-25 PROCEDURE — 85027 COMPLETE CBC AUTOMATED: CPT | Performed by: NURSE PRACTITIONER

## 2022-03-25 PROCEDURE — 85610 PROTHROMBIN TIME: CPT | Performed by: NURSE PRACTITIONER

## 2022-03-25 PROCEDURE — 94664 DEMO&/EVAL PT USE INHALER: CPT

## 2022-03-25 PROCEDURE — 25010000002 CEFTRIAXONE PER 250 MG: Performed by: INTERNAL MEDICINE

## 2022-03-25 PROCEDURE — 63710000001 DIPHENHYDRAMINE PER 50 MG: Performed by: NURSE PRACTITIONER

## 2022-03-25 PROCEDURE — 84145 PROCALCITONIN (PCT): CPT | Performed by: STUDENT IN AN ORGANIZED HEALTH CARE EDUCATION/TRAINING PROGRAM

## 2022-03-25 PROCEDURE — 25010000002 ENOXAPARIN PER 10 MG: Performed by: INTERNAL MEDICINE

## 2022-03-25 RX ORDER — IPRATROPIUM BROMIDE AND ALBUTEROL SULFATE 2.5; .5 MG/3ML; MG/3ML
3 SOLUTION RESPIRATORY (INHALATION)
Status: DISCONTINUED | OUTPATIENT
Start: 2022-03-25 | End: 2022-04-04 | Stop reason: HOSPADM

## 2022-03-25 RX ADMIN — DIPHENHYDRAMINE HYDROCHLORIDE 25 MG: 25 CAPSULE ORAL at 21:36

## 2022-03-25 RX ADMIN — VANCOMYCIN HYDROCHLORIDE 125 MG: 125 CAPSULE ORAL at 09:12

## 2022-03-25 RX ADMIN — METOPROLOL SUCCINATE 12.5 MG: 25 TABLET, EXTENDED RELEASE ORAL at 09:12

## 2022-03-25 RX ADMIN — MAGNESIUM OXIDE 400 MG (241.3 MG MAGNESIUM) TABLET 400 MG: TABLET at 21:09

## 2022-03-25 RX ADMIN — Medication 10 ML: at 22:22

## 2022-03-25 RX ADMIN — ENOXAPARIN SODIUM 70 MG: 100 INJECTION SUBCUTANEOUS at 21:09

## 2022-03-25 RX ADMIN — MAGNESIUM OXIDE 400 MG (241.3 MG MAGNESIUM) TABLET 400 MG: TABLET at 09:13

## 2022-03-25 RX ADMIN — IPRATROPIUM BROMIDE AND ALBUTEROL SULFATE 3 ML: 2.5; .5 SOLUTION RESPIRATORY (INHALATION) at 11:21

## 2022-03-25 RX ADMIN — METRONIDAZOLE 500 MG: 500 INJECTION, SOLUTION INTRAVENOUS at 01:10

## 2022-03-25 RX ADMIN — IPRATROPIUM BROMIDE AND ALBUTEROL SULFATE 3 ML: 2.5; .5 SOLUTION RESPIRATORY (INHALATION) at 19:40

## 2022-03-25 RX ADMIN — Medication 1 CAPSULE: at 09:12

## 2022-03-25 RX ADMIN — CEFTRIAXONE 1 G: 1 INJECTION, POWDER, FOR SOLUTION INTRAMUSCULAR; INTRAVENOUS at 12:30

## 2022-03-25 RX ADMIN — SODIUM BICARBONATE 1300 MG: 650 TABLET ORAL at 21:09

## 2022-03-25 RX ADMIN — Medication 10 ML: at 09:12

## 2022-03-25 RX ADMIN — FAMOTIDINE 20 MG: 20 TABLET ORAL at 16:44

## 2022-03-25 RX ADMIN — SODIUM CHLORIDE 100 ML/HR: 9 INJECTION, SOLUTION INTRAVENOUS at 09:04

## 2022-03-25 RX ADMIN — SODIUM BICARBONATE 1300 MG: 650 TABLET ORAL at 16:43

## 2022-03-25 RX ADMIN — FAMOTIDINE 20 MG: 20 TABLET ORAL at 06:06

## 2022-03-25 RX ADMIN — ENOXAPARIN SODIUM 70 MG: 100 INJECTION SUBCUTANEOUS at 09:09

## 2022-03-25 RX ADMIN — GUAIFENESIN 1200 MG: 600 TABLET, EXTENDED RELEASE ORAL at 09:13

## 2022-03-25 RX ADMIN — IPRATROPIUM BROMIDE AND ALBUTEROL SULFATE 3 ML: 2.5; .5 SOLUTION RESPIRATORY (INHALATION) at 15:09

## 2022-03-25 RX ADMIN — SODIUM CHLORIDE 100 ML/HR: 9 INJECTION, SOLUTION INTRAVENOUS at 01:08

## 2022-03-25 RX ADMIN — METRONIDAZOLE 500 MG: 500 INJECTION, SOLUTION INTRAVENOUS at 16:44

## 2022-03-25 RX ADMIN — DIGOXIN 62.5 MCG: 125 TABLET ORAL at 12:20

## 2022-03-25 RX ADMIN — METRONIDAZOLE 500 MG: 500 INJECTION, SOLUTION INTRAVENOUS at 09:06

## 2022-03-25 RX ADMIN — GUAIFENESIN 1200 MG: 600 TABLET, EXTENDED RELEASE ORAL at 21:09

## 2022-03-25 RX ADMIN — SODIUM CHLORIDE 100 ML/HR: 9 INJECTION, SOLUTION INTRAVENOUS at 16:45

## 2022-03-25 RX ADMIN — SODIUM BICARBONATE 1300 MG: 650 TABLET ORAL at 09:12

## 2022-03-25 NOTE — TELEPHONE ENCOUNTER
Spoke to [facility], they are aware to continue nursing and physical therapy

## 2022-03-26 LAB
ALBUMIN SERPL-MCNC: 2.3 G/DL (ref 3.5–5.2)
ALBUMIN/GLOB SERPL: 0.7 G/DL
ALP SERPL-CCNC: 120 U/L (ref 39–117)
ALT SERPL W P-5'-P-CCNC: 9 U/L (ref 1–41)
ANION GAP SERPL CALCULATED.3IONS-SCNC: 11.7 MMOL/L (ref 5–15)
AST SERPL-CCNC: 11 U/L (ref 1–40)
BACTERIA SPEC RESP CULT: NORMAL
BILIRUB SERPL-MCNC: 0.6 MG/DL (ref 0–1.2)
BUN SERPL-MCNC: 22 MG/DL (ref 8–23)
BUN/CREAT SERPL: 19 (ref 7–25)
CALCIUM SPEC-SCNC: 7.8 MG/DL (ref 8.6–10.5)
CHLORIDE SERPL-SCNC: 115 MMOL/L (ref 98–107)
CO2 SERPL-SCNC: 23.3 MMOL/L (ref 22–29)
CREAT SERPL-MCNC: 1.16 MG/DL (ref 0.76–1.27)
DEPRECATED RDW RBC AUTO: 47.5 FL (ref 37–54)
EGFRCR SERPLBLD CKD-EPI 2021: 62.1 ML/MIN/1.73
ERYTHROCYTE [DISTWIDTH] IN BLOOD BY AUTOMATED COUNT: 16.1 % (ref 12.3–15.4)
GLOBULIN UR ELPH-MCNC: 3.1 GM/DL
GLUCOSE BLDC GLUCOMTR-MCNC: 128 MG/DL (ref 70–130)
GLUCOSE BLDC GLUCOMTR-MCNC: 131 MG/DL (ref 70–130)
GLUCOSE BLDC GLUCOMTR-MCNC: 149 MG/DL (ref 70–130)
GLUCOSE SERPL-MCNC: 117 MG/DL (ref 65–99)
GRAM STN SPEC: NORMAL
HCT VFR BLD AUTO: 30.3 % (ref 37.5–51)
HGB BLD-MCNC: 9.6 G/DL (ref 13–17.7)
INR PPP: 2.67 (ref 0.9–1.1)
MCH RBC QN AUTO: 25.6 PG (ref 26.6–33)
MCHC RBC AUTO-ENTMCNC: 31.7 G/DL (ref 31.5–35.7)
MCV RBC AUTO: 80.8 FL (ref 79–97)
PLATELET # BLD AUTO: 142 10*3/MM3 (ref 140–450)
PMV BLD AUTO: 11.7 FL (ref 6–12)
POTASSIUM SERPL-SCNC: 3.8 MMOL/L (ref 3.5–5.2)
PROT SERPL-MCNC: 5.4 G/DL (ref 6–8.5)
PROTHROMBIN TIME: 28.5 SECONDS (ref 11.7–14.2)
RBC # BLD AUTO: 3.75 10*6/MM3 (ref 4.14–5.8)
SODIUM SERPL-SCNC: 150 MMOL/L (ref 136–145)
WBC NRBC COR # BLD: 11.95 10*3/MM3 (ref 3.4–10.8)

## 2022-03-26 PROCEDURE — 94799 UNLISTED PULMONARY SVC/PX: CPT

## 2022-03-26 PROCEDURE — 99232 SBSQ HOSP IP/OBS MODERATE 35: CPT | Performed by: INTERNAL MEDICINE

## 2022-03-26 PROCEDURE — 94761 N-INVAS EAR/PLS OXIMETRY MLT: CPT

## 2022-03-26 PROCEDURE — 82962 GLUCOSE BLOOD TEST: CPT

## 2022-03-26 PROCEDURE — 94760 N-INVAS EAR/PLS OXIMETRY 1: CPT

## 2022-03-26 PROCEDURE — 94664 DEMO&/EVAL PT USE INHALER: CPT

## 2022-03-26 PROCEDURE — 85027 COMPLETE CBC AUTOMATED: CPT | Performed by: NURSE PRACTITIONER

## 2022-03-26 PROCEDURE — 99232 SBSQ HOSP IP/OBS MODERATE 35: CPT | Performed by: NURSE PRACTITIONER

## 2022-03-26 PROCEDURE — 80053 COMPREHEN METABOLIC PANEL: CPT | Performed by: NURSE PRACTITIONER

## 2022-03-26 PROCEDURE — 97530 THERAPEUTIC ACTIVITIES: CPT

## 2022-03-26 PROCEDURE — 25010000002 ENOXAPARIN PER 10 MG: Performed by: INTERNAL MEDICINE

## 2022-03-26 PROCEDURE — 25010000002 CEFTRIAXONE PER 250 MG: Performed by: INTERNAL MEDICINE

## 2022-03-26 PROCEDURE — 85610 PROTHROMBIN TIME: CPT | Performed by: NURSE PRACTITIONER

## 2022-03-26 RX ORDER — SODIUM CHLORIDE 450 MG/100ML
75 INJECTION, SOLUTION INTRAVENOUS CONTINUOUS
Status: DISCONTINUED | OUTPATIENT
Start: 2022-03-26 | End: 2022-04-01

## 2022-03-26 RX ADMIN — Medication 1 CAPSULE: at 08:32

## 2022-03-26 RX ADMIN — Medication 10 ML: at 20:24

## 2022-03-26 RX ADMIN — ENOXAPARIN SODIUM 70 MG: 100 INJECTION SUBCUTANEOUS at 08:31

## 2022-03-26 RX ADMIN — FAMOTIDINE 20 MG: 20 TABLET ORAL at 06:31

## 2022-03-26 RX ADMIN — MAGNESIUM OXIDE 400 MG (241.3 MG MAGNESIUM) TABLET 400 MG: TABLET at 20:19

## 2022-03-26 RX ADMIN — IPRATROPIUM BROMIDE AND ALBUTEROL SULFATE 3 ML: 2.5; .5 SOLUTION RESPIRATORY (INHALATION) at 15:24

## 2022-03-26 RX ADMIN — METRONIDAZOLE 500 MG: 500 INJECTION, SOLUTION INTRAVENOUS at 08:48

## 2022-03-26 RX ADMIN — MAGNESIUM OXIDE 400 MG (241.3 MG MAGNESIUM) TABLET 400 MG: TABLET at 08:32

## 2022-03-26 RX ADMIN — GUAIFENESIN 1200 MG: 600 TABLET, EXTENDED RELEASE ORAL at 08:32

## 2022-03-26 RX ADMIN — CEFTRIAXONE 1 G: 1 INJECTION, POWDER, FOR SOLUTION INTRAMUSCULAR; INTRAVENOUS at 12:53

## 2022-03-26 RX ADMIN — METOPROLOL SUCCINATE 12.5 MG: 25 TABLET, EXTENDED RELEASE ORAL at 08:32

## 2022-03-26 RX ADMIN — SODIUM BICARBONATE 1300 MG: 650 TABLET ORAL at 08:32

## 2022-03-26 RX ADMIN — SODIUM CHLORIDE 100 ML/HR: 4.5 INJECTION, SOLUTION INTRAVENOUS at 08:49

## 2022-03-26 RX ADMIN — SODIUM BICARBONATE 1300 MG: 650 TABLET ORAL at 16:36

## 2022-03-26 RX ADMIN — GUAIFENESIN 1200 MG: 600 TABLET, EXTENDED RELEASE ORAL at 20:19

## 2022-03-26 RX ADMIN — METRONIDAZOLE 500 MG: 500 INJECTION, SOLUTION INTRAVENOUS at 16:36

## 2022-03-26 RX ADMIN — SODIUM BICARBONATE 1300 MG: 650 TABLET ORAL at 20:19

## 2022-03-26 RX ADMIN — METRONIDAZOLE 500 MG: 500 INJECTION, SOLUTION INTRAVENOUS at 23:39

## 2022-03-26 RX ADMIN — IPRATROPIUM BROMIDE AND ALBUTEROL SULFATE 3 ML: 2.5; .5 SOLUTION RESPIRATORY (INHALATION) at 07:21

## 2022-03-26 RX ADMIN — ENOXAPARIN SODIUM 70 MG: 100 INJECTION SUBCUTANEOUS at 20:25

## 2022-03-26 RX ADMIN — IPRATROPIUM BROMIDE AND ALBUTEROL SULFATE 3 ML: 2.5; .5 SOLUTION RESPIRATORY (INHALATION) at 22:57

## 2022-03-26 RX ADMIN — IPRATROPIUM BROMIDE AND ALBUTEROL SULFATE 3 ML: 2.5; .5 SOLUTION RESPIRATORY (INHALATION) at 11:36

## 2022-03-26 RX ADMIN — FERROUS SULFATE TAB 325 MG (65 MG ELEMENTAL FE) 325 MG: 325 (65 FE) TAB at 08:32

## 2022-03-26 RX ADMIN — FAMOTIDINE 20 MG: 20 TABLET ORAL at 16:36

## 2022-03-26 RX ADMIN — METRONIDAZOLE 500 MG: 500 INJECTION, SOLUTION INTRAVENOUS at 00:17

## 2022-03-26 RX ADMIN — DIGOXIN 62.5 MCG: 125 TABLET ORAL at 12:52

## 2022-03-26 RX ADMIN — VANCOMYCIN HYDROCHLORIDE 125 MG: 125 CAPSULE ORAL at 08:32

## 2022-03-27 LAB
ALBUMIN SERPL-MCNC: 2.4 G/DL (ref 3.5–5.2)
ALBUMIN/GLOB SERPL: 0.8 G/DL
ALP SERPL-CCNC: 128 U/L (ref 39–117)
ALT SERPL W P-5'-P-CCNC: 10 U/L (ref 1–41)
ANION GAP SERPL CALCULATED.3IONS-SCNC: 7 MMOL/L (ref 5–15)
AST SERPL-CCNC: 14 U/L (ref 1–40)
BILIRUB SERPL-MCNC: 0.6 MG/DL (ref 0–1.2)
BUN SERPL-MCNC: 18 MG/DL (ref 8–23)
BUN/CREAT SERPL: 16.2 (ref 7–25)
CALCIUM SPEC-SCNC: 7.6 MG/DL (ref 8.6–10.5)
CHLORIDE SERPL-SCNC: 112 MMOL/L (ref 98–107)
CO2 SERPL-SCNC: 23 MMOL/L (ref 22–29)
CREAT SERPL-MCNC: 1.11 MG/DL (ref 0.76–1.27)
DEPRECATED RDW RBC AUTO: 48.8 FL (ref 37–54)
EGFRCR SERPLBLD CKD-EPI 2021: 65.5 ML/MIN/1.73
ERYTHROCYTE [DISTWIDTH] IN BLOOD BY AUTOMATED COUNT: 16.5 % (ref 12.3–15.4)
GLOBULIN UR ELPH-MCNC: 3 GM/DL
GLUCOSE BLDC GLUCOMTR-MCNC: 125 MG/DL (ref 70–130)
GLUCOSE BLDC GLUCOMTR-MCNC: 151 MG/DL (ref 70–130)
GLUCOSE BLDC GLUCOMTR-MCNC: 162 MG/DL (ref 70–130)
GLUCOSE SERPL-MCNC: 108 MG/DL (ref 65–99)
HCT VFR BLD AUTO: 31.9 % (ref 37.5–51)
HGB BLD-MCNC: 10.1 G/DL (ref 13–17.7)
INR PPP: 2.58 (ref 0.9–1.1)
MCH RBC QN AUTO: 25.6 PG (ref 26.6–33)
MCHC RBC AUTO-ENTMCNC: 31.7 G/DL (ref 31.5–35.7)
MCV RBC AUTO: 80.8 FL (ref 79–97)
PLATELET # BLD AUTO: 150 10*3/MM3 (ref 140–450)
PMV BLD AUTO: 11.9 FL (ref 6–12)
POTASSIUM SERPL-SCNC: 3.6 MMOL/L (ref 3.5–5.2)
PROT SERPL-MCNC: 5.4 G/DL (ref 6–8.5)
PROTHROMBIN TIME: 27.8 SECONDS (ref 11.7–14.2)
RBC # BLD AUTO: 3.95 10*6/MM3 (ref 4.14–5.8)
SODIUM SERPL-SCNC: 142 MMOL/L (ref 136–145)
WBC NRBC COR # BLD: 9.78 10*3/MM3 (ref 3.4–10.8)

## 2022-03-27 PROCEDURE — 94799 UNLISTED PULMONARY SVC/PX: CPT

## 2022-03-27 PROCEDURE — 99232 SBSQ HOSP IP/OBS MODERATE 35: CPT | Performed by: STUDENT IN AN ORGANIZED HEALTH CARE EDUCATION/TRAINING PROGRAM

## 2022-03-27 PROCEDURE — 85610 PROTHROMBIN TIME: CPT | Performed by: NURSE PRACTITIONER

## 2022-03-27 PROCEDURE — 99232 SBSQ HOSP IP/OBS MODERATE 35: CPT | Performed by: INTERNAL MEDICINE

## 2022-03-27 PROCEDURE — 63710000001 INSULIN LISPRO (HUMAN) PER 5 UNITS: Performed by: NURSE PRACTITIONER

## 2022-03-27 PROCEDURE — 80053 COMPREHEN METABOLIC PANEL: CPT | Performed by: NURSE PRACTITIONER

## 2022-03-27 PROCEDURE — 36415 COLL VENOUS BLD VENIPUNCTURE: CPT | Performed by: NURSE PRACTITIONER

## 2022-03-27 PROCEDURE — 82962 GLUCOSE BLOOD TEST: CPT

## 2022-03-27 PROCEDURE — 85027 COMPLETE CBC AUTOMATED: CPT | Performed by: NURSE PRACTITIONER

## 2022-03-27 PROCEDURE — 25010000002 CEFTRIAXONE PER 250 MG: Performed by: INTERNAL MEDICINE

## 2022-03-27 PROCEDURE — 25010000002 ENOXAPARIN PER 10 MG: Performed by: INTERNAL MEDICINE

## 2022-03-27 PROCEDURE — 99231 SBSQ HOSP IP/OBS SF/LOW 25: CPT | Performed by: NURSE PRACTITIONER

## 2022-03-27 RX ADMIN — IPRATROPIUM BROMIDE AND ALBUTEROL SULFATE 3 ML: 2.5; .5 SOLUTION RESPIRATORY (INHALATION) at 06:57

## 2022-03-27 RX ADMIN — IPRATROPIUM BROMIDE AND ALBUTEROL SULFATE 3 ML: 2.5; .5 SOLUTION RESPIRATORY (INHALATION) at 15:32

## 2022-03-27 RX ADMIN — SODIUM BICARBONATE 1300 MG: 650 TABLET ORAL at 17:37

## 2022-03-27 RX ADMIN — SODIUM BICARBONATE 1300 MG: 650 TABLET ORAL at 09:04

## 2022-03-27 RX ADMIN — FAMOTIDINE 20 MG: 20 TABLET ORAL at 09:04

## 2022-03-27 RX ADMIN — VANCOMYCIN HYDROCHLORIDE 125 MG: 125 CAPSULE ORAL at 09:04

## 2022-03-27 RX ADMIN — MAGNESIUM OXIDE 400 MG (241.3 MG MAGNESIUM) TABLET 400 MG: TABLET at 20:34

## 2022-03-27 RX ADMIN — ENOXAPARIN SODIUM 70 MG: 100 INJECTION SUBCUTANEOUS at 20:35

## 2022-03-27 RX ADMIN — SODIUM CHLORIDE 75 ML/HR: 4.5 INJECTION, SOLUTION INTRAVENOUS at 23:31

## 2022-03-27 RX ADMIN — ENOXAPARIN SODIUM 70 MG: 100 INJECTION SUBCUTANEOUS at 09:04

## 2022-03-27 RX ADMIN — METOPROLOL SUCCINATE 12.5 MG: 25 TABLET, EXTENDED RELEASE ORAL at 09:04

## 2022-03-27 RX ADMIN — IPRATROPIUM BROMIDE AND ALBUTEROL SULFATE 3 ML: 2.5; .5 SOLUTION RESPIRATORY (INHALATION) at 22:03

## 2022-03-27 RX ADMIN — METRONIDAZOLE 500 MG: 500 INJECTION, SOLUTION INTRAVENOUS at 09:04

## 2022-03-27 RX ADMIN — METRONIDAZOLE 500 MG: 500 INJECTION, SOLUTION INTRAVENOUS at 17:37

## 2022-03-27 RX ADMIN — Medication 10 ML: at 09:05

## 2022-03-27 RX ADMIN — METRONIDAZOLE 500 MG: 500 INJECTION, SOLUTION INTRAVENOUS at 23:31

## 2022-03-27 RX ADMIN — MAGNESIUM OXIDE 400 MG (241.3 MG MAGNESIUM) TABLET 400 MG: TABLET at 09:04

## 2022-03-27 RX ADMIN — CEFTRIAXONE 1 G: 1 INJECTION, POWDER, FOR SOLUTION INTRAMUSCULAR; INTRAVENOUS at 13:37

## 2022-03-27 RX ADMIN — DIGOXIN 62.5 MCG: 125 TABLET ORAL at 13:37

## 2022-03-27 RX ADMIN — FAMOTIDINE 20 MG: 20 TABLET ORAL at 17:37

## 2022-03-27 RX ADMIN — SODIUM BICARBONATE 1300 MG: 650 TABLET ORAL at 20:32

## 2022-03-27 RX ADMIN — GUAIFENESIN 1200 MG: 600 TABLET, EXTENDED RELEASE ORAL at 20:34

## 2022-03-27 RX ADMIN — Medication 10 ML: at 20:32

## 2022-03-27 RX ADMIN — GUAIFENESIN 1200 MG: 600 TABLET, EXTENDED RELEASE ORAL at 09:04

## 2022-03-27 RX ADMIN — Medication 1 CAPSULE: at 09:04

## 2022-03-27 RX ADMIN — INSULIN LISPRO 2 UNITS: 100 INJECTION, SOLUTION INTRAVENOUS; SUBCUTANEOUS at 17:37

## 2022-03-28 ENCOUNTER — PREP FOR SURGERY (OUTPATIENT)
Dept: OTHER | Facility: HOSPITAL | Age: 87
End: 2022-03-28

## 2022-03-28 ENCOUNTER — APPOINTMENT (OUTPATIENT)
Dept: GENERAL RADIOLOGY | Facility: HOSPITAL | Age: 87
End: 2022-03-28

## 2022-03-28 DIAGNOSIS — K80.01 CALCULUS OF GALLBLADDER WITH ACUTE CHOLECYSTITIS AND OBSTRUCTION: Primary | ICD-10-CM

## 2022-03-28 LAB
ALBUMIN SERPL-MCNC: 2.1 G/DL (ref 3.5–5.2)
ALBUMIN/GLOB SERPL: 0.6 G/DL
ALP SERPL-CCNC: 125 U/L (ref 39–117)
ALT SERPL W P-5'-P-CCNC: 7 U/L (ref 1–41)
ANION GAP SERPL CALCULATED.3IONS-SCNC: 12.6 MMOL/L (ref 5–15)
AST SERPL-CCNC: 14 U/L (ref 1–40)
BACTERIA SPEC AEROBE CULT: NORMAL
BACTERIA SPEC AEROBE CULT: NORMAL
BILIRUB SERPL-MCNC: 0.5 MG/DL (ref 0–1.2)
BUN SERPL-MCNC: 16 MG/DL (ref 8–23)
BUN/CREAT SERPL: 14.7 (ref 7–25)
CALCIUM SPEC-SCNC: 7.6 MG/DL (ref 8.6–10.5)
CHLORIDE SERPL-SCNC: 109 MMOL/L (ref 98–107)
CO2 SERPL-SCNC: 21.4 MMOL/L (ref 22–29)
CREAT SERPL-MCNC: 1.09 MG/DL (ref 0.76–1.27)
DEPRECATED RDW RBC AUTO: 50.1 FL (ref 37–54)
EGFRCR SERPLBLD CKD-EPI 2021: 66.9 ML/MIN/1.73
ERYTHROCYTE [DISTWIDTH] IN BLOOD BY AUTOMATED COUNT: 16.6 % (ref 12.3–15.4)
GLOBULIN UR ELPH-MCNC: 3.3 GM/DL
GLUCOSE BLDC GLUCOMTR-MCNC: 106 MG/DL (ref 70–130)
GLUCOSE BLDC GLUCOMTR-MCNC: 117 MG/DL (ref 70–130)
GLUCOSE BLDC GLUCOMTR-MCNC: 154 MG/DL (ref 70–130)
GLUCOSE BLDC GLUCOMTR-MCNC: 155 MG/DL (ref 70–130)
GLUCOSE SERPL-MCNC: 106 MG/DL (ref 65–99)
HCT VFR BLD AUTO: 30 % (ref 37.5–51)
HGB BLD-MCNC: 9.6 G/DL (ref 13–17.7)
INR PPP: 2.56 (ref 0.9–1.1)
MCH RBC QN AUTO: 26.3 PG (ref 26.6–33)
MCHC RBC AUTO-ENTMCNC: 32 G/DL (ref 31.5–35.7)
MCV RBC AUTO: 82.2 FL (ref 79–97)
PLATELET # BLD AUTO: 158 10*3/MM3 (ref 140–450)
PMV BLD AUTO: 11.4 FL (ref 6–12)
POTASSIUM SERPL-SCNC: 3.6 MMOL/L (ref 3.5–5.2)
PROT SERPL-MCNC: 5.4 G/DL (ref 6–8.5)
PROTHROMBIN TIME: 27.6 SECONDS (ref 11.7–14.2)
RBC # BLD AUTO: 3.65 10*6/MM3 (ref 4.14–5.8)
SARS-COV-2 RNA RESP QL NAA+PROBE: NOT DETECTED
SODIUM SERPL-SCNC: 143 MMOL/L (ref 136–145)
WBC NRBC COR # BLD: 6.49 10*3/MM3 (ref 3.4–10.8)

## 2022-03-28 PROCEDURE — 25010000002 CEFTRIAXONE PER 250 MG: Performed by: INTERNAL MEDICINE

## 2022-03-28 PROCEDURE — 63710000001 INSULIN LISPRO (HUMAN) PER 5 UNITS: Performed by: NURSE PRACTITIONER

## 2022-03-28 PROCEDURE — 82962 GLUCOSE BLOOD TEST: CPT

## 2022-03-28 PROCEDURE — 94761 N-INVAS EAR/PLS OXIMETRY MLT: CPT

## 2022-03-28 PROCEDURE — 99232 SBSQ HOSP IP/OBS MODERATE 35: CPT | Performed by: INTERNAL MEDICINE

## 2022-03-28 PROCEDURE — 0 PHYTONADIONE 10 MG/ML SOLUTION 1 ML AMPULE: Performed by: INTERNAL MEDICINE

## 2022-03-28 PROCEDURE — 99231 SBSQ HOSP IP/OBS SF/LOW 25: CPT | Performed by: STUDENT IN AN ORGANIZED HEALTH CARE EDUCATION/TRAINING PROGRAM

## 2022-03-28 PROCEDURE — 99232 SBSQ HOSP IP/OBS MODERATE 35: CPT | Performed by: STUDENT IN AN ORGANIZED HEALTH CARE EDUCATION/TRAINING PROGRAM

## 2022-03-28 PROCEDURE — 25010000002 ENOXAPARIN PER 10 MG: Performed by: INTERNAL MEDICINE

## 2022-03-28 PROCEDURE — 94664 DEMO&/EVAL PT USE INHALER: CPT

## 2022-03-28 PROCEDURE — 85610 PROTHROMBIN TIME: CPT | Performed by: NURSE PRACTITIONER

## 2022-03-28 PROCEDURE — 85027 COMPLETE CBC AUTOMATED: CPT | Performed by: NURSE PRACTITIONER

## 2022-03-28 PROCEDURE — 80053 COMPREHEN METABOLIC PANEL: CPT | Performed by: NURSE PRACTITIONER

## 2022-03-28 PROCEDURE — U0003 INFECTIOUS AGENT DETECTION BY NUCLEIC ACID (DNA OR RNA); SEVERE ACUTE RESPIRATORY SYNDROME CORONAVIRUS 2 (SARS-COV-2) (CORONAVIRUS DISEASE [COVID-19]), AMPLIFIED PROBE TECHNIQUE, MAKING USE OF HIGH THROUGHPUT TECHNOLOGIES AS DESCRIBED BY CMS-2020-01-R: HCPCS | Performed by: STUDENT IN AN ORGANIZED HEALTH CARE EDUCATION/TRAINING PROGRAM

## 2022-03-28 PROCEDURE — 94799 UNLISTED PULMONARY SVC/PX: CPT

## 2022-03-28 PROCEDURE — 36415 COLL VENOUS BLD VENIPUNCTURE: CPT | Performed by: NURSE PRACTITIONER

## 2022-03-28 PROCEDURE — 97530 THERAPEUTIC ACTIVITIES: CPT

## 2022-03-28 PROCEDURE — 71046 X-RAY EXAM CHEST 2 VIEWS: CPT

## 2022-03-28 RX ADMIN — IPRATROPIUM BROMIDE AND ALBUTEROL SULFATE 3 ML: 2.5; .5 SOLUTION RESPIRATORY (INHALATION) at 14:46

## 2022-03-28 RX ADMIN — METRONIDAZOLE 500 MG: 500 INJECTION, SOLUTION INTRAVENOUS at 09:12

## 2022-03-28 RX ADMIN — METOPROLOL SUCCINATE 12.5 MG: 25 TABLET, EXTENDED RELEASE ORAL at 09:12

## 2022-03-28 RX ADMIN — ENOXAPARIN SODIUM 70 MG: 100 INJECTION SUBCUTANEOUS at 09:12

## 2022-03-28 RX ADMIN — FERROUS SULFATE TAB 325 MG (65 MG ELEMENTAL FE) 325 MG: 325 (65 FE) TAB at 09:12

## 2022-03-28 RX ADMIN — MAGNESIUM OXIDE 400 MG (241.3 MG MAGNESIUM) TABLET 400 MG: TABLET at 09:12

## 2022-03-28 RX ADMIN — PHYTONADIONE 2.5 MG: 10 INJECTION, EMULSION INTRAMUSCULAR; INTRAVENOUS; SUBCUTANEOUS at 11:53

## 2022-03-28 RX ADMIN — CEFTRIAXONE 1 G: 1 INJECTION, POWDER, FOR SOLUTION INTRAMUSCULAR; INTRAVENOUS at 14:09

## 2022-03-28 RX ADMIN — MAGNESIUM OXIDE 400 MG (241.3 MG MAGNESIUM) TABLET 400 MG: TABLET at 20:47

## 2022-03-28 RX ADMIN — IPRATROPIUM BROMIDE AND ALBUTEROL SULFATE 3 ML: 2.5; .5 SOLUTION RESPIRATORY (INHALATION) at 19:17

## 2022-03-28 RX ADMIN — SODIUM BICARBONATE 1300 MG: 650 TABLET ORAL at 20:47

## 2022-03-28 RX ADMIN — FAMOTIDINE 20 MG: 20 TABLET ORAL at 09:12

## 2022-03-28 RX ADMIN — SODIUM BICARBONATE 1300 MG: 650 TABLET ORAL at 17:39

## 2022-03-28 RX ADMIN — VANCOMYCIN HYDROCHLORIDE 125 MG: 125 CAPSULE ORAL at 09:12

## 2022-03-28 RX ADMIN — GUAIFENESIN 1200 MG: 600 TABLET, EXTENDED RELEASE ORAL at 09:11

## 2022-03-28 RX ADMIN — IPRATROPIUM BROMIDE AND ALBUTEROL SULFATE 3 ML: 2.5; .5 SOLUTION RESPIRATORY (INHALATION) at 07:24

## 2022-03-28 RX ADMIN — DIGOXIN 62.5 MCG: 125 TABLET ORAL at 17:39

## 2022-03-28 RX ADMIN — SODIUM CHLORIDE 75 ML/HR: 4.5 INJECTION, SOLUTION INTRAVENOUS at 17:41

## 2022-03-28 RX ADMIN — Medication 1 CAPSULE: at 09:12

## 2022-03-28 RX ADMIN — GUAIFENESIN 1200 MG: 600 TABLET, EXTENDED RELEASE ORAL at 20:47

## 2022-03-28 RX ADMIN — METRONIDAZOLE 500 MG: 500 INJECTION, SOLUTION INTRAVENOUS at 23:32

## 2022-03-28 RX ADMIN — INSULIN LISPRO 2 UNITS: 100 INJECTION, SOLUTION INTRAVENOUS; SUBCUTANEOUS at 17:39

## 2022-03-28 RX ADMIN — SODIUM BICARBONATE 1300 MG: 650 TABLET ORAL at 09:12

## 2022-03-28 RX ADMIN — ENOXAPARIN SODIUM 70 MG: 100 INJECTION SUBCUTANEOUS at 20:47

## 2022-03-28 RX ADMIN — FAMOTIDINE 20 MG: 20 TABLET ORAL at 17:39

## 2022-03-28 RX ADMIN — METRONIDAZOLE 500 MG: 500 INJECTION, SOLUTION INTRAVENOUS at 17:40

## 2022-03-28 RX ADMIN — Medication 10 ML: at 20:48

## 2022-03-29 ENCOUNTER — ANESTHESIA EVENT (OUTPATIENT)
Dept: PERIOP | Facility: HOSPITAL | Age: 87
End: 2022-03-29

## 2022-03-29 ENCOUNTER — APPOINTMENT (OUTPATIENT)
Dept: GENERAL RADIOLOGY | Facility: HOSPITAL | Age: 87
End: 2022-03-29

## 2022-03-29 ENCOUNTER — ANESTHESIA (OUTPATIENT)
Dept: PERIOP | Facility: HOSPITAL | Age: 87
End: 2022-03-29

## 2022-03-29 LAB
ALBUMIN SERPL-MCNC: 2.3 G/DL (ref 3.5–5.2)
ALBUMIN/GLOB SERPL: 0.7 G/DL
ALP SERPL-CCNC: 144 U/L (ref 39–117)
ALT SERPL W P-5'-P-CCNC: 8 U/L (ref 1–41)
ANION GAP SERPL CALCULATED.3IONS-SCNC: 11 MMOL/L (ref 5–15)
AST SERPL-CCNC: 15 U/L (ref 1–40)
BILIRUB SERPL-MCNC: 0.5 MG/DL (ref 0–1.2)
BUN SERPL-MCNC: 13 MG/DL (ref 8–23)
BUN/CREAT SERPL: 12.7 (ref 7–25)
CALCIUM SPEC-SCNC: 7.7 MG/DL (ref 8.6–10.5)
CHLORIDE SERPL-SCNC: 110 MMOL/L (ref 98–107)
CO2 SERPL-SCNC: 23 MMOL/L (ref 22–29)
CREAT SERPL-MCNC: 1.02 MG/DL (ref 0.76–1.27)
DEPRECATED RDW RBC AUTO: 45.9 FL (ref 37–54)
EGFRCR SERPLBLD CKD-EPI 2021: 72.5 ML/MIN/1.73
ERYTHROCYTE [DISTWIDTH] IN BLOOD BY AUTOMATED COUNT: 16.3 % (ref 12.3–15.4)
GLOBULIN UR ELPH-MCNC: 3.3 GM/DL
GLUCOSE BLDC GLUCOMTR-MCNC: 121 MG/DL (ref 70–130)
GLUCOSE BLDC GLUCOMTR-MCNC: 124 MG/DL (ref 70–130)
GLUCOSE BLDC GLUCOMTR-MCNC: 124 MG/DL (ref 70–130)
GLUCOSE BLDC GLUCOMTR-MCNC: 131 MG/DL (ref 70–130)
GLUCOSE SERPL-MCNC: 117 MG/DL (ref 65–99)
HCT VFR BLD AUTO: 28.8 % (ref 37.5–51)
HGB BLD-MCNC: 9.5 G/DL (ref 13–17.7)
INR PPP: 1.44 (ref 0.9–1.1)
MCH RBC QN AUTO: 25.7 PG (ref 26.6–33)
MCHC RBC AUTO-ENTMCNC: 33 G/DL (ref 31.5–35.7)
MCV RBC AUTO: 77.8 FL (ref 79–97)
PLATELET # BLD AUTO: 170 10*3/MM3 (ref 140–450)
PMV BLD AUTO: 11.1 FL (ref 6–12)
POTASSIUM SERPL-SCNC: 3.6 MMOL/L (ref 3.5–5.2)
PROT SERPL-MCNC: 5.6 G/DL (ref 6–8.5)
PROTHROMBIN TIME: 17.5 SECONDS (ref 11.7–14.2)
RBC # BLD AUTO: 3.7 10*6/MM3 (ref 4.14–5.8)
SODIUM SERPL-SCNC: 144 MMOL/L (ref 136–145)
WBC NRBC COR # BLD: 6.47 10*3/MM3 (ref 3.4–10.8)

## 2022-03-29 PROCEDURE — BF121ZZ FLUOROSCOPY OF GALLBLADDER USING LOW OSMOLAR CONTRAST: ICD-10-PCS | Performed by: STUDENT IN AN ORGANIZED HEALTH CARE EDUCATION/TRAINING PROGRAM

## 2022-03-29 PROCEDURE — C1889 IMPLANT/INSERT DEVICE, NOC: HCPCS | Performed by: STUDENT IN AN ORGANIZED HEALTH CARE EDUCATION/TRAINING PROGRAM

## 2022-03-29 PROCEDURE — 82962 GLUCOSE BLOOD TEST: CPT

## 2022-03-29 PROCEDURE — 80053 COMPREHEN METABOLIC PANEL: CPT | Performed by: NURSE PRACTITIONER

## 2022-03-29 PROCEDURE — 94761 N-INVAS EAR/PLS OXIMETRY MLT: CPT

## 2022-03-29 PROCEDURE — 25010000002 DEXAMETHASONE PER 1 MG: Performed by: ANESTHESIOLOGY

## 2022-03-29 PROCEDURE — 0 IOTHALAMATE 60 % SOLUTION: Performed by: STUDENT IN AN ORGANIZED HEALTH CARE EDUCATION/TRAINING PROGRAM

## 2022-03-29 PROCEDURE — 47563 LAPARO CHOLECYSTECTOMY/GRAPH: CPT | Performed by: STUDENT IN AN ORGANIZED HEALTH CARE EDUCATION/TRAINING PROGRAM

## 2022-03-29 PROCEDURE — 0FT44ZZ RESECTION OF GALLBLADDER, PERCUTANEOUS ENDOSCOPIC APPROACH: ICD-10-PCS | Performed by: STUDENT IN AN ORGANIZED HEALTH CARE EDUCATION/TRAINING PROGRAM

## 2022-03-29 PROCEDURE — 25010000002 ONDANSETRON PER 1 MG: Performed by: ANESTHESIOLOGY

## 2022-03-29 PROCEDURE — 25010000002 PHENYLEPHRINE 10 MG/ML SOLUTION: Performed by: ANESTHESIOLOGY

## 2022-03-29 PROCEDURE — 94799 UNLISTED PULMONARY SVC/PX: CPT

## 2022-03-29 PROCEDURE — 74300 X-RAY BILE DUCTS/PANCREAS: CPT

## 2022-03-29 PROCEDURE — 85610 PROTHROMBIN TIME: CPT | Performed by: NURSE PRACTITIONER

## 2022-03-29 PROCEDURE — 25010000002 PROPOFOL 10 MG/ML EMULSION: Performed by: ANESTHESIOLOGY

## 2022-03-29 PROCEDURE — 85027 COMPLETE CBC AUTOMATED: CPT | Performed by: NURSE PRACTITIONER

## 2022-03-29 PROCEDURE — 99232 SBSQ HOSP IP/OBS MODERATE 35: CPT | Performed by: STUDENT IN AN ORGANIZED HEALTH CARE EDUCATION/TRAINING PROGRAM

## 2022-03-29 PROCEDURE — 99232 SBSQ HOSP IP/OBS MODERATE 35: CPT | Performed by: NURSE PRACTITIONER

## 2022-03-29 PROCEDURE — 25010000002 FENTANYL CITRATE (PF) 50 MCG/ML SOLUTION: Performed by: ANESTHESIOLOGY

## 2022-03-29 PROCEDURE — 88304 TISSUE EXAM BY PATHOLOGIST: CPT | Performed by: STUDENT IN AN ORGANIZED HEALTH CARE EDUCATION/TRAINING PROGRAM

## 2022-03-29 PROCEDURE — 25010000002 CEFTRIAXONE PER 250 MG: Performed by: INTERNAL MEDICINE

## 2022-03-29 PROCEDURE — 94664 DEMO&/EVAL PT USE INHALER: CPT

## 2022-03-29 PROCEDURE — 47563 LAPARO CHOLECYSTECTOMY/GRAPH: CPT | Performed by: SPECIALIST/TECHNOLOGIST, OTHER

## 2022-03-29 DEVICE — ARISTA AH ABSORBABLE HEMOSTATIC PARTICLES
Type: IMPLANTABLE DEVICE | Site: ABDOMEN | Status: FUNCTIONAL
Brand: ARISTA™ AH

## 2022-03-29 DEVICE — HORIZON TI ML 6 CLIPS/CART
Type: IMPLANTABLE DEVICE | Site: ABDOMEN | Status: FUNCTIONAL
Brand: WECK

## 2022-03-29 RX ORDER — LIDOCAINE HYDROCHLORIDE 10 MG/ML
0.5 INJECTION, SOLUTION EPIDURAL; INFILTRATION; INTRACAUDAL; PERINEURAL ONCE AS NEEDED
Status: DISCONTINUED | OUTPATIENT
Start: 2022-03-29 | End: 2022-03-29 | Stop reason: HOSPADM

## 2022-03-29 RX ORDER — ROCURONIUM BROMIDE 10 MG/ML
INJECTION, SOLUTION INTRAVENOUS AS NEEDED
Status: DISCONTINUED | OUTPATIENT
Start: 2022-03-29 | End: 2022-03-29 | Stop reason: SURG

## 2022-03-29 RX ORDER — SODIUM CHLORIDE 0.9 % (FLUSH) 0.9 %
3 SYRINGE (ML) INJECTION EVERY 12 HOURS SCHEDULED
Status: DISCONTINUED | OUTPATIENT
Start: 2022-03-29 | End: 2022-03-29 | Stop reason: HOSPADM

## 2022-03-29 RX ORDER — HYDROCODONE BITARTRATE AND ACETAMINOPHEN 5; 325 MG/1; MG/1
1 TABLET ORAL EVERY 6 HOURS PRN
Status: DISCONTINUED | OUTPATIENT
Start: 2022-03-29 | End: 2022-04-04 | Stop reason: HOSPADM

## 2022-03-29 RX ORDER — SODIUM CHLORIDE 0.9 % (FLUSH) 0.9 %
3-10 SYRINGE (ML) INJECTION AS NEEDED
Status: DISCONTINUED | OUTPATIENT
Start: 2022-03-29 | End: 2022-03-29 | Stop reason: HOSPADM

## 2022-03-29 RX ORDER — DEXAMETHASONE SODIUM PHOSPHATE 10 MG/ML
INJECTION INTRAMUSCULAR; INTRAVENOUS AS NEEDED
Status: DISCONTINUED | OUTPATIENT
Start: 2022-03-29 | End: 2022-03-29 | Stop reason: SURG

## 2022-03-29 RX ORDER — FENTANYL CITRATE 50 UG/ML
25 INJECTION, SOLUTION INTRAMUSCULAR; INTRAVENOUS
Status: DISCONTINUED | OUTPATIENT
Start: 2022-03-29 | End: 2022-03-29 | Stop reason: HOSPADM

## 2022-03-29 RX ORDER — MAGNESIUM HYDROXIDE 1200 MG/15ML
LIQUID ORAL AS NEEDED
Status: DISCONTINUED | OUTPATIENT
Start: 2022-03-29 | End: 2022-03-29 | Stop reason: HOSPADM

## 2022-03-29 RX ORDER — FAMOTIDINE 10 MG/ML
20 INJECTION, SOLUTION INTRAVENOUS ONCE
Status: COMPLETED | OUTPATIENT
Start: 2022-03-29 | End: 2022-03-29

## 2022-03-29 RX ORDER — PHENYLEPHRINE HYDROCHLORIDE 10 MG/ML
INJECTION INTRAVENOUS AS NEEDED
Status: DISCONTINUED | OUTPATIENT
Start: 2022-03-29 | End: 2022-03-29 | Stop reason: SURG

## 2022-03-29 RX ORDER — SODIUM CHLORIDE, SODIUM LACTATE, POTASSIUM CHLORIDE, CALCIUM CHLORIDE 600; 310; 30; 20 MG/100ML; MG/100ML; MG/100ML; MG/100ML
9 INJECTION, SOLUTION INTRAVENOUS CONTINUOUS
Status: DISCONTINUED | OUTPATIENT
Start: 2022-03-29 | End: 2022-04-04 | Stop reason: HOSPADM

## 2022-03-29 RX ORDER — DIPHENHYDRAMINE HYDROCHLORIDE 50 MG/ML
12.5 INJECTION INTRAMUSCULAR; INTRAVENOUS
Status: DISCONTINUED | OUTPATIENT
Start: 2022-03-29 | End: 2022-03-29 | Stop reason: HOSPADM

## 2022-03-29 RX ORDER — LIDOCAINE HYDROCHLORIDE 20 MG/ML
INJECTION, SOLUTION INFILTRATION; PERINEURAL AS NEEDED
Status: DISCONTINUED | OUTPATIENT
Start: 2022-03-29 | End: 2022-03-29 | Stop reason: SURG

## 2022-03-29 RX ORDER — ONDANSETRON 2 MG/ML
INJECTION INTRAMUSCULAR; INTRAVENOUS AS NEEDED
Status: DISCONTINUED | OUTPATIENT
Start: 2022-03-29 | End: 2022-03-29 | Stop reason: SURG

## 2022-03-29 RX ORDER — ASPIRIN 81 MG/1
81 TABLET ORAL DAILY
Qty: 90 TABLET | Refills: 0 | OUTPATIENT
Start: 2022-03-29

## 2022-03-29 RX ORDER — PROMETHAZINE HYDROCHLORIDE 25 MG/1
25 TABLET ORAL ONCE AS NEEDED
Status: DISCONTINUED | OUTPATIENT
Start: 2022-03-29 | End: 2022-03-29 | Stop reason: HOSPADM

## 2022-03-29 RX ORDER — PROPOFOL 10 MG/ML
VIAL (ML) INTRAVENOUS AS NEEDED
Status: DISCONTINUED | OUTPATIENT
Start: 2022-03-29 | End: 2022-03-29 | Stop reason: SURG

## 2022-03-29 RX ORDER — ONDANSETRON 2 MG/ML
4 INJECTION INTRAMUSCULAR; INTRAVENOUS ONCE AS NEEDED
Status: DISCONTINUED | OUTPATIENT
Start: 2022-03-29 | End: 2022-03-29 | Stop reason: HOSPADM

## 2022-03-29 RX ORDER — SODIUM CHLORIDE 9 MG/ML
INJECTION, SOLUTION INTRAVENOUS AS NEEDED
Status: DISCONTINUED | OUTPATIENT
Start: 2022-03-29 | End: 2022-03-29 | Stop reason: HOSPADM

## 2022-03-29 RX ORDER — NALOXONE HCL 0.4 MG/ML
0.4 VIAL (ML) INJECTION AS NEEDED
Status: DISCONTINUED | OUTPATIENT
Start: 2022-03-29 | End: 2022-03-29 | Stop reason: HOSPADM

## 2022-03-29 RX ORDER — HYDRALAZINE HYDROCHLORIDE 20 MG/ML
5 INJECTION INTRAMUSCULAR; INTRAVENOUS
Status: DISCONTINUED | OUTPATIENT
Start: 2022-03-29 | End: 2022-03-29 | Stop reason: HOSPADM

## 2022-03-29 RX ORDER — BUPIVACAINE HYDROCHLORIDE AND EPINEPHRINE 5; 5 MG/ML; UG/ML
INJECTION, SOLUTION EPIDURAL; INTRACAUDAL; PERINEURAL AS NEEDED
Status: DISCONTINUED | OUTPATIENT
Start: 2022-03-29 | End: 2022-03-29 | Stop reason: HOSPADM

## 2022-03-29 RX ORDER — FENTANYL CITRATE 50 UG/ML
INJECTION, SOLUTION INTRAMUSCULAR; INTRAVENOUS AS NEEDED
Status: DISCONTINUED | OUTPATIENT
Start: 2022-03-29 | End: 2022-03-29 | Stop reason: SURG

## 2022-03-29 RX ADMIN — PHENYLEPHRINE HYDROCHLORIDE 100 MCG: 10 INJECTION, SOLUTION INTRAVENOUS at 20:04

## 2022-03-29 RX ADMIN — IPRATROPIUM BROMIDE AND ALBUTEROL SULFATE 3 ML: 2.5; .5 SOLUTION RESPIRATORY (INHALATION) at 06:59

## 2022-03-29 RX ADMIN — METRONIDAZOLE 500 MG: 500 INJECTION, SOLUTION INTRAVENOUS at 08:39

## 2022-03-29 RX ADMIN — FENTANYL CITRATE 25 MCG: 0.05 INJECTION, SOLUTION INTRAMUSCULAR; INTRAVENOUS at 20:14

## 2022-03-29 RX ADMIN — CEFTRIAXONE 1 G: 1 INJECTION, POWDER, FOR SOLUTION INTRAMUSCULAR; INTRAVENOUS at 15:34

## 2022-03-29 RX ADMIN — SODIUM CHLORIDE, POTASSIUM CHLORIDE, SODIUM LACTATE AND CALCIUM CHLORIDE 9 ML/HR: 600; 310; 30; 20 INJECTION, SOLUTION INTRAVENOUS at 16:30

## 2022-03-29 RX ADMIN — ROCURONIUM BROMIDE 10 MG: 50 INJECTION INTRAVENOUS at 20:09

## 2022-03-29 RX ADMIN — ROCURONIUM BROMIDE 40 MG: 50 INJECTION INTRAVENOUS at 18:31

## 2022-03-29 RX ADMIN — ONDANSETRON 4 MG: 2 INJECTION INTRAMUSCULAR; INTRAVENOUS at 20:26

## 2022-03-29 RX ADMIN — IPRATROPIUM BROMIDE AND ALBUTEROL SULFATE 3 ML: 2.5; .5 SOLUTION RESPIRATORY (INHALATION) at 15:24

## 2022-03-29 RX ADMIN — FENTANYL CITRATE 25 MCG: 0.05 INJECTION, SOLUTION INTRAMUSCULAR; INTRAVENOUS at 19:17

## 2022-03-29 RX ADMIN — LIDOCAINE HYDROCHLORIDE 60 MG: 20 INJECTION, SOLUTION INFILTRATION; PERINEURAL at 18:31

## 2022-03-29 RX ADMIN — FENTANYL CITRATE 25 MCG: 0.05 INJECTION, SOLUTION INTRAMUSCULAR; INTRAVENOUS at 18:31

## 2022-03-29 RX ADMIN — SUGAMMADEX 200 MG: 100 INJECTION, SOLUTION INTRAVENOUS at 20:22

## 2022-03-29 RX ADMIN — METRONIDAZOLE 500 MG: 500 INJECTION, SOLUTION INTRAVENOUS at 17:57

## 2022-03-29 RX ADMIN — METRONIDAZOLE 500 MG: 500 INJECTION, SOLUTION INTRAVENOUS at 23:53

## 2022-03-29 RX ADMIN — DEXAMETHASONE SODIUM PHOSPHATE 8 MG: 10 INJECTION INTRAMUSCULAR; INTRAVENOUS at 18:35

## 2022-03-29 RX ADMIN — PROPOFOL 100 MG: 10 INJECTION, EMULSION INTRAVENOUS at 18:31

## 2022-03-29 RX ADMIN — FENTANYL CITRATE 25 MCG: 0.05 INJECTION, SOLUTION INTRAMUSCULAR; INTRAVENOUS at 19:56

## 2022-03-29 RX ADMIN — METOPROLOL SUCCINATE 12.5 MG: 25 TABLET, EXTENDED RELEASE ORAL at 08:41

## 2022-03-29 RX ADMIN — IPRATROPIUM BROMIDE AND ALBUTEROL SULFATE 3 ML: 2.5; .5 SOLUTION RESPIRATORY (INHALATION) at 10:41

## 2022-03-29 RX ADMIN — VANCOMYCIN HYDROCHLORIDE 125 MG: 125 CAPSULE ORAL at 08:41

## 2022-03-29 RX ADMIN — PHENYLEPHRINE HYDROCHLORIDE 200 MCG: 10 INJECTION, SOLUTION INTRAVENOUS at 19:52

## 2022-03-29 RX ADMIN — Medication 10 ML: at 08:40

## 2022-03-29 RX ADMIN — FAMOTIDINE 20 MG: 10 INJECTION INTRAVENOUS at 16:30

## 2022-03-29 NOTE — ANESTHESIA PROCEDURE NOTES
Airway  Urgency: elective    Date/Time: 3/29/2022 6:40 PM  Airway not difficult    General Information and Staff    Patient location during procedure: OR  Anesthesiologist: Sandi Kern MD    Indications and Patient Condition  Indications for airway management: airway protection    Preoxygenated: yes  Mask difficulty assessment: 1 - vent by mask    Final Airway Details  Final airway type: endotracheal airway      Successful airway: ETT  Cuffed: yes   Successful intubation technique: direct laryngoscopy  Endotracheal tube insertion site: oral  Blade: Dewayne  Blade size: 3  ETT size (mm): 7.5  Cormack-Lehane Classification: grade I - full view of glottis  Placement verified by: chest auscultation and capnometry   Measured from: gums  ETT/EBT to gums (cm): 23  Number of attempts at approach: 1  Assessment: lips, teeth, and gum same as pre-op and atraumatic intubation

## 2022-03-29 NOTE — ANESTHESIA PREPROCEDURE EVALUATION
Anesthesia Evaluation     Patient summary reviewed and Nursing notes reviewed                Airway   Mallampati: II  TM distance: >3 FB  Neck ROM: full  No difficulty expected  Dental - normal exam     Pulmonary - normal exam   (+) pleural effusion, a smoker Former,     ROS comment: Recurrent right pleural effusion  Cardiovascular     ECG reviewed  Rhythm: irregular  Rate: normal    (+) hypertension 2 medications or greater, valvular problems/murmurs AS, dysrhythmias Atrial Fib, hyperlipidemia,     ROS comment: Hx of AS due to congenital bicuspid valve, s/p prosthetic  AVR and ascending aortic aneurysm repair long ago/hx of NICM/EF 60%/mod TR/severe pulmonary HTN by ECHO 10/21  PE comment: Afib/prosthetic aortic valve sounds    Neuro/Psych  (+) CVA residual symptoms, syncope,      ROS Comment: Hx CVA with residual left-sided weakness  GI/Hepatic/Renal/Endo    (+)  GERD, GI bleeding , renal disease CRI, diabetes mellitus type 2 poorly controlled using insulin,     ROS Comment: Hx nephrectomy    Musculoskeletal     Abdominal    Substance History      OB/GYN          Other      history of cancer      Other Comment: Kidney CA                    Anesthesia Plan    ASA 4     general     intravenous induction     Anesthetic plan, all risks, benefits, and alternatives have been provided, discussed and informed consent has been obtained with: patient.    Plan discussed with CRNA.

## 2022-03-30 ENCOUNTER — APPOINTMENT (OUTPATIENT)
Dept: ULTRASOUND IMAGING | Facility: HOSPITAL | Age: 87
End: 2022-03-30

## 2022-03-30 ENCOUNTER — APPOINTMENT (OUTPATIENT)
Dept: GENERAL RADIOLOGY | Facility: HOSPITAL | Age: 87
End: 2022-03-30

## 2022-03-30 LAB
ALBUMIN SERPL-MCNC: 2.1 G/DL (ref 3.5–5.2)
ALBUMIN/GLOB SERPL: 0.7 G/DL
ALP SERPL-CCNC: 146 U/L (ref 39–117)
ALT SERPL W P-5'-P-CCNC: 10 U/L (ref 1–41)
ANION GAP SERPL CALCULATED.3IONS-SCNC: 10.8 MMOL/L (ref 5–15)
ANION GAP SERPL CALCULATED.3IONS-SCNC: 11 MMOL/L (ref 5–15)
ANISOCYTOSIS BLD QL: ABNORMAL
APPEARANCE FLD: CLEAR
ARTERIAL PATENCY WRIST A: POSITIVE
AST SERPL-CCNC: 31 U/L (ref 1–40)
ATMOSPHERIC PRESS: 737.4 MMHG
BASE EXCESS BLDA CALC-SCNC: -1.9 MMOL/L (ref 0–2)
BASOPHILS # BLD MANUAL: 0.03 10*3/MM3 (ref 0–0.2)
BASOPHILS NFR BLD MANUAL: 1 % (ref 0–1.5)
BDY SITE: ABNORMAL
BILIRUB SERPL-MCNC: 0.4 MG/DL (ref 0–1.2)
BUN SERPL-MCNC: 16 MG/DL (ref 8–23)
BUN SERPL-MCNC: 20 MG/DL (ref 8–23)
BUN/CREAT SERPL: 14.7 (ref 7–25)
BUN/CREAT SERPL: 15 (ref 7–25)
CALCIUM SPEC-SCNC: 7.4 MG/DL (ref 8.6–10.5)
CALCIUM SPEC-SCNC: 7.5 MG/DL (ref 8.6–10.5)
CHLORIDE SERPL-SCNC: 109 MMOL/L (ref 98–107)
CHLORIDE SERPL-SCNC: 110 MMOL/L (ref 98–107)
CO2 SERPL-SCNC: 21 MMOL/L (ref 22–29)
CO2 SERPL-SCNC: 21.2 MMOL/L (ref 22–29)
COLOR FLD: YELLOW
CREAT SERPL-MCNC: 1.07 MG/DL (ref 0.76–1.27)
CREAT SERPL-MCNC: 1.36 MG/DL (ref 0.76–1.27)
DEPRECATED RDW RBC AUTO: 49 FL (ref 37–54)
DEPRECATED RDW RBC AUTO: 49.4 FL (ref 37–54)
EGFRCR SERPLBLD CKD-EPI 2021: 51.3 ML/MIN/1.73
EGFRCR SERPLBLD CKD-EPI 2021: 68.4 ML/MIN/1.73
ERYTHROCYTE [DISTWIDTH] IN BLOOD BY AUTOMATED COUNT: 16.3 % (ref 12.3–15.4)
ERYTHROCYTE [DISTWIDTH] IN BLOOD BY AUTOMATED COUNT: 16.9 % (ref 12.3–15.4)
GAS FLOW AIRWAY: 3 LPM
GIE STN SPEC: NORMAL
GLOBULIN UR ELPH-MCNC: 3.2 GM/DL
GLUCOSE BLDC GLUCOMTR-MCNC: 133 MG/DL (ref 70–130)
GLUCOSE BLDC GLUCOMTR-MCNC: 151 MG/DL (ref 70–130)
GLUCOSE BLDC GLUCOMTR-MCNC: 169 MG/DL (ref 70–130)
GLUCOSE BLDC GLUCOMTR-MCNC: 185 MG/DL (ref 70–130)
GLUCOSE FLD-MCNC: 142 MG/DL
GLUCOSE SERPL-MCNC: 159 MG/DL (ref 65–99)
GLUCOSE SERPL-MCNC: 159 MG/DL (ref 65–99)
HCO3 BLDA-SCNC: 21.5 MMOL/L (ref 22–28)
HCT VFR BLD AUTO: 29.3 % (ref 37.5–51)
HCT VFR BLD AUTO: 31.2 % (ref 37.5–51)
HGB BLD-MCNC: 9.4 G/DL (ref 13–17.7)
HGB BLD-MCNC: 9.8 G/DL (ref 13–17.7)
INR PPP: 1.31 (ref 0.9–1.1)
LDH FLD-CCNC: 86 U/L
LYMPHOCYTES # BLD MANUAL: 2.03 10*3/MM3 (ref 0.7–3.1)
LYMPHOCYTES NFR BLD MANUAL: 2 % (ref 5–12)
LYMPHOCYTES NFR FLD MANUAL: 72 %
MCH RBC QN AUTO: 25.5 PG (ref 26.6–33)
MCH RBC QN AUTO: 26.3 PG (ref 26.6–33)
MCHC RBC AUTO-ENTMCNC: 31.4 G/DL (ref 31.5–35.7)
MCHC RBC AUTO-ENTMCNC: 32.1 G/DL (ref 31.5–35.7)
MCV RBC AUTO: 81 FL (ref 79–97)
MCV RBC AUTO: 82.1 FL (ref 79–97)
METHOD: NORMAL
MICROCYTES BLD QL: ABNORMAL
MODALITY: ABNORMAL
MONOCYTES # BLD: 0.06 10*3/MM3 (ref 0.1–0.9)
MONOS+MACROS NFR FLD: 21 %
NEUTROPHILS # BLD AUTO: 1 10*3/MM3 (ref 1.7–7)
NEUTROPHILS NFR BLD MANUAL: 32 % (ref 42.7–76)
NEUTROPHILS NFR FLD MANUAL: 7 %
NRBC SPEC MANUAL: 2 /100 WBC (ref 0–0.2)
NUC CELL # FLD: 181 /MM3
PCO2 BLDA: 30.6 MM HG (ref 35–45)
PH BLDA: 7.45 PH UNITS (ref 7.35–7.45)
PLAT MORPH BLD: NORMAL
PLATELET # BLD AUTO: 134 10*3/MM3 (ref 140–450)
PLATELET # BLD AUTO: 172 10*3/MM3 (ref 140–450)
PMV BLD AUTO: 11.3 FL (ref 6–12)
PMV BLD AUTO: 11.4 FL (ref 6–12)
PO2 BLDA: 91.1 MM HG (ref 80–100)
POIKILOCYTOSIS BLD QL SMEAR: ABNORMAL
POLYCHROMASIA BLD QL SMEAR: ABNORMAL
POTASSIUM SERPL-SCNC: 3.9 MMOL/L (ref 3.5–5.2)
POTASSIUM SERPL-SCNC: 4 MMOL/L (ref 3.5–5.2)
PROT FLD-MCNC: 1.5 G/DL
PROT SERPL-MCNC: 5.3 G/DL (ref 6–8.5)
PROTHROMBIN TIME: 16.2 SECONDS (ref 11.7–14.2)
QT INTERVAL: 398 MS
RBC # BLD AUTO: 3.57 10*6/MM3 (ref 4.14–5.8)
RBC # BLD AUTO: 3.85 10*6/MM3 (ref 4.14–5.8)
RBC # FLD AUTO: 7979 /MM3
SAO2 % BLDCOA: 97.6 % (ref 92–99)
SODIUM SERPL-SCNC: 141 MMOL/L (ref 136–145)
SODIUM SERPL-SCNC: 142 MMOL/L (ref 136–145)
TOTAL RATE: 23 BREATHS/MINUTE
TROPONIN T SERPL-MCNC: 0.02 NG/ML (ref 0–0.03)
VARIANT LYMPHS NFR BLD MANUAL: 65 % (ref 19.6–45.3)
WBC MORPH BLD: NORMAL
WBC NRBC COR # BLD: 3.13 10*3/MM3 (ref 3.4–10.8)
WBC NRBC COR # BLD: 7.6 10*3/MM3 (ref 3.4–10.8)

## 2022-03-30 PROCEDURE — 0 LIDOCAINE 1 % SOLUTION: Performed by: RADIOLOGY

## 2022-03-30 PROCEDURE — 85007 BL SMEAR W/DIFF WBC COUNT: CPT | Performed by: HOSPITALIST

## 2022-03-30 PROCEDURE — 80053 COMPREHEN METABOLIC PANEL: CPT | Performed by: STUDENT IN AN ORGANIZED HEALTH CARE EDUCATION/TRAINING PROGRAM

## 2022-03-30 PROCEDURE — 84484 ASSAY OF TROPONIN QUANT: CPT | Performed by: HOSPITALIST

## 2022-03-30 PROCEDURE — 85025 COMPLETE CBC W/AUTO DIFF WBC: CPT | Performed by: HOSPITALIST

## 2022-03-30 PROCEDURE — 94799 UNLISTED PULMONARY SVC/PX: CPT

## 2022-03-30 PROCEDURE — 36600 WITHDRAWAL OF ARTERIAL BLOOD: CPT

## 2022-03-30 PROCEDURE — 93010 ELECTROCARDIOGRAM REPORT: CPT | Performed by: INTERNAL MEDICINE

## 2022-03-30 PROCEDURE — 25010000002 ENOXAPARIN PER 10 MG: Performed by: STUDENT IN AN ORGANIZED HEALTH CARE EDUCATION/TRAINING PROGRAM

## 2022-03-30 PROCEDURE — 94760 N-INVAS EAR/PLS OXIMETRY 1: CPT

## 2022-03-30 PROCEDURE — 92526 ORAL FUNCTION THERAPY: CPT | Performed by: SPEECH-LANGUAGE PATHOLOGIST

## 2022-03-30 PROCEDURE — 88112 CYTOPATH CELL ENHANCE TECH: CPT | Performed by: INTERNAL MEDICINE

## 2022-03-30 PROCEDURE — 25010000002 ONDANSETRON PER 1 MG: Performed by: STUDENT IN AN ORGANIZED HEALTH CARE EDUCATION/TRAINING PROGRAM

## 2022-03-30 PROCEDURE — 71045 X-RAY EXAM CHEST 1 VIEW: CPT

## 2022-03-30 PROCEDURE — 87206 SMEAR FLUORESCENT/ACID STAI: CPT | Performed by: INTERNAL MEDICINE

## 2022-03-30 PROCEDURE — 76942 ECHO GUIDE FOR BIOPSY: CPT

## 2022-03-30 PROCEDURE — 82962 GLUCOSE BLOOD TEST: CPT

## 2022-03-30 PROCEDURE — 99231 SBSQ HOSP IP/OBS SF/LOW 25: CPT | Performed by: NURSE PRACTITIONER

## 2022-03-30 PROCEDURE — 0W993ZZ DRAINAGE OF RIGHT PLEURAL CAVITY, PERCUTANEOUS APPROACH: ICD-10-PCS | Performed by: RADIOLOGY

## 2022-03-30 PROCEDURE — 85027 COMPLETE CBC AUTOMATED: CPT | Performed by: STUDENT IN AN ORGANIZED HEALTH CARE EDUCATION/TRAINING PROGRAM

## 2022-03-30 PROCEDURE — 89051 BODY FLUID CELL COUNT: CPT | Performed by: INTERNAL MEDICINE

## 2022-03-30 PROCEDURE — 36415 COLL VENOUS BLD VENIPUNCTURE: CPT | Performed by: STUDENT IN AN ORGANIZED HEALTH CARE EDUCATION/TRAINING PROGRAM

## 2022-03-30 PROCEDURE — 83615 LACTATE (LD) (LDH) ENZYME: CPT | Performed by: INTERNAL MEDICINE

## 2022-03-30 PROCEDURE — 25010000002 CEFTRIAXONE PER 250 MG: Performed by: INTERNAL MEDICINE

## 2022-03-30 PROCEDURE — 94761 N-INVAS EAR/PLS OXIMETRY MLT: CPT

## 2022-03-30 PROCEDURE — 82945 GLUCOSE OTHER FLUID: CPT | Performed by: INTERNAL MEDICINE

## 2022-03-30 PROCEDURE — 87102 FUNGUS ISOLATION CULTURE: CPT | Performed by: INTERNAL MEDICINE

## 2022-03-30 PROCEDURE — 82803 BLOOD GASES ANY COMBINATION: CPT

## 2022-03-30 PROCEDURE — 84157 ASSAY OF PROTEIN OTHER: CPT | Performed by: INTERNAL MEDICINE

## 2022-03-30 PROCEDURE — 85610 PROTHROMBIN TIME: CPT | Performed by: STUDENT IN AN ORGANIZED HEALTH CARE EDUCATION/TRAINING PROGRAM

## 2022-03-30 PROCEDURE — 94664 DEMO&/EVAL PT USE INHALER: CPT

## 2022-03-30 PROCEDURE — 88305 TISSUE EXAM BY PATHOLOGIST: CPT | Performed by: INTERNAL MEDICINE

## 2022-03-30 PROCEDURE — 87116 MYCOBACTERIA CULTURE: CPT | Performed by: INTERNAL MEDICINE

## 2022-03-30 PROCEDURE — 93005 ELECTROCARDIOGRAM TRACING: CPT | Performed by: HOSPITALIST

## 2022-03-30 RX ORDER — LIDOCAINE HYDROCHLORIDE 10 MG/ML
10 INJECTION, SOLUTION INFILTRATION; PERINEURAL ONCE
Status: COMPLETED | OUTPATIENT
Start: 2022-03-30 | End: 2022-03-30

## 2022-03-30 RX ADMIN — CEFTRIAXONE 1 G: 1 INJECTION, POWDER, FOR SOLUTION INTRAMUSCULAR; INTRAVENOUS at 16:06

## 2022-03-30 RX ADMIN — Medication 10 ML: at 20:51

## 2022-03-30 RX ADMIN — MAGNESIUM OXIDE 400 MG (241.3 MG MAGNESIUM) TABLET 400 MG: TABLET at 12:20

## 2022-03-30 RX ADMIN — SODIUM CHLORIDE 250 ML: 0.9 INJECTION, SOLUTION INTRAVENOUS at 23:20

## 2022-03-30 RX ADMIN — DIGOXIN 62.5 MCG: 125 TABLET ORAL at 16:03

## 2022-03-30 RX ADMIN — MAGNESIUM OXIDE 400 MG (241.3 MG MAGNESIUM) TABLET 400 MG: TABLET at 20:49

## 2022-03-30 RX ADMIN — SODIUM BICARBONATE 1300 MG: 650 TABLET ORAL at 12:20

## 2022-03-30 RX ADMIN — Medication 1 CAPSULE: at 12:20

## 2022-03-30 RX ADMIN — GUAIFENESIN 1200 MG: 600 TABLET, EXTENDED RELEASE ORAL at 20:49

## 2022-03-30 RX ADMIN — ENOXAPARIN SODIUM 70 MG: 100 INJECTION SUBCUTANEOUS at 12:19

## 2022-03-30 RX ADMIN — LIDOCAINE HYDROCHLORIDE 7 ML: 10 INJECTION, SOLUTION INFILTRATION; PERINEURAL at 09:41

## 2022-03-30 RX ADMIN — VANCOMYCIN HYDROCHLORIDE 125 MG: 125 CAPSULE ORAL at 16:01

## 2022-03-30 RX ADMIN — SODIUM BICARBONATE 1300 MG: 650 TABLET ORAL at 20:49

## 2022-03-30 RX ADMIN — METOPROLOL SUCCINATE 12.5 MG: 25 TABLET, EXTENDED RELEASE ORAL at 12:20

## 2022-03-30 RX ADMIN — GUAIFENESIN 1200 MG: 600 TABLET, EXTENDED RELEASE ORAL at 12:21

## 2022-03-30 RX ADMIN — ENOXAPARIN SODIUM 70 MG: 100 INJECTION SUBCUTANEOUS at 20:50

## 2022-03-30 RX ADMIN — FERROUS SULFATE TAB 325 MG (65 MG ELEMENTAL FE) 325 MG: 325 (65 FE) TAB at 12:20

## 2022-03-30 RX ADMIN — ONDANSETRON HYDROCHLORIDE 4 MG: 2 SOLUTION INTRAMUSCULAR; INTRAVENOUS at 16:36

## 2022-03-30 RX ADMIN — IPRATROPIUM BROMIDE AND ALBUTEROL SULFATE 3 ML: 2.5; .5 SOLUTION RESPIRATORY (INHALATION) at 15:09

## 2022-03-30 RX ADMIN — METRONIDAZOLE 500 MG: 500 INJECTION, SOLUTION INTRAVENOUS at 12:30

## 2022-03-30 RX ADMIN — IPRATROPIUM BROMIDE AND ALBUTEROL SULFATE 3 ML: 2.5; .5 SOLUTION RESPIRATORY (INHALATION) at 07:53

## 2022-03-30 RX ADMIN — IPRATROPIUM BROMIDE AND ALBUTEROL SULFATE 3 ML: 2.5; .5 SOLUTION RESPIRATORY (INHALATION) at 19:36

## 2022-03-30 RX ADMIN — IPRATROPIUM BROMIDE AND ALBUTEROL SULFATE 3 ML: 2.5; .5 SOLUTION RESPIRATORY (INHALATION) at 11:01

## 2022-03-30 RX ADMIN — Medication 10 ML: at 12:21

## 2022-03-30 RX ADMIN — FAMOTIDINE 20 MG: 20 TABLET ORAL at 06:40

## 2022-03-30 NOTE — ANESTHESIA POSTPROCEDURE EVALUATION
"Patient: Francisco Sequeira    Procedure Summary     Date: 03/29/22 Room / Location: [facility] OR 02 / [facility] MAIN OR    Anesthesia Start: 1815 Anesthesia Stop: 2033    Procedure: Laparoscopic cholecystectomy with cholangiogram, possible open (N/A Abdomen) Diagnosis:       Calculus of gallbladder with acute cholecystitis and obstruction      (Calculus of gallbladder with acute cholecystitis and obstruction [K80.01])    Surgeons: Lisa Guidry MD Provider: Param Cox MD    Anesthesia Type: general ASA Status: 4          Anesthesia Type: general    Vitals  Vitals Value Taken Time   /77 03/29/22 2115   Temp 37.1 °C (98.8 °F) 03/29/22 2115   Pulse 99 03/29/22 2115   Resp 20 03/29/22 2115   SpO2 98 % 03/29/22 2115           Post Anesthesia Care and Evaluation    Patient location during evaluation: bedside  Patient participation: complete - patient participated  Level of consciousness: sleepy but conscious  Pain score: 0  Pain management: adequate  Airway patency: patent  Anesthetic complications: No anesthetic complications    Cardiovascular status: acceptable  Respiratory status: acceptable  Hydration status: acceptable    Comments: /77 (BP Location: Left arm, Patient Position: Lying)   Pulse 99   Temp 37.1 °C (98.8 °F) (Oral)   Resp 20   Ht 182.9 cm (72\")   Wt 70.3 kg (155 lb)   SpO2 98%   BMI 21.02 kg/m²         "

## 2022-03-31 LAB
ALBUMIN SERPL-MCNC: 2.1 G/DL (ref 3.5–5.2)
ALBUMIN/GLOB SERPL: 0.7 G/DL
ALP SERPL-CCNC: 140 U/L (ref 39–117)
ALT SERPL W P-5'-P-CCNC: 12 U/L (ref 1–41)
ANION GAP SERPL CALCULATED.3IONS-SCNC: 8.1 MMOL/L (ref 5–15)
AST SERPL-CCNC: 35 U/L (ref 1–40)
BILIRUB SERPL-MCNC: 0.3 MG/DL (ref 0–1.2)
BUN SERPL-MCNC: 24 MG/DL (ref 8–23)
BUN/CREAT SERPL: 15.7 (ref 7–25)
CALCIUM SPEC-SCNC: 7.5 MG/DL (ref 8.6–10.5)
CHLORIDE SERPL-SCNC: 111 MMOL/L (ref 98–107)
CO2 SERPL-SCNC: 22.9 MMOL/L (ref 22–29)
CREAT SERPL-MCNC: 1.53 MG/DL (ref 0.76–1.27)
CYTO UR: NORMAL
DEPRECATED RDW RBC AUTO: 49.8 FL (ref 37–54)
EGFRCR SERPLBLD CKD-EPI 2021: 44.6 ML/MIN/1.73
ERYTHROCYTE [DISTWIDTH] IN BLOOD BY AUTOMATED COUNT: 16.5 % (ref 12.3–15.4)
GLOBULIN UR ELPH-MCNC: 3 GM/DL
GLUCOSE BLDC GLUCOMTR-MCNC: 136 MG/DL (ref 70–130)
GLUCOSE BLDC GLUCOMTR-MCNC: 136 MG/DL (ref 70–130)
GLUCOSE BLDC GLUCOMTR-MCNC: 210 MG/DL (ref 70–130)
GLUCOSE BLDC GLUCOMTR-MCNC: 212 MG/DL (ref 70–130)
GLUCOSE SERPL-MCNC: 144 MG/DL (ref 65–99)
HCT VFR BLD AUTO: 29.4 % (ref 37.5–51)
HGB BLD-MCNC: 9.1 G/DL (ref 13–17.7)
INR PPP: 1.35 (ref 0.9–1.1)
LAB AP CASE REPORT: NORMAL
LAB AP CASE REPORT: NORMAL
MCH RBC QN AUTO: 25.5 PG (ref 26.6–33)
MCHC RBC AUTO-ENTMCNC: 31 G/DL (ref 31.5–35.7)
MCV RBC AUTO: 82.4 FL (ref 79–97)
PATH REPORT.FINAL DX SPEC: NORMAL
PATH REPORT.FINAL DX SPEC: NORMAL
PATH REPORT.GROSS SPEC: NORMAL
PATH REPORT.GROSS SPEC: NORMAL
PLATELET # BLD AUTO: 170 10*3/MM3 (ref 140–450)
PMV BLD AUTO: 11.1 FL (ref 6–12)
POTASSIUM SERPL-SCNC: 3.9 MMOL/L (ref 3.5–5.2)
PROT SERPL-MCNC: 5.1 G/DL (ref 6–8.5)
PROTHROMBIN TIME: 16.6 SECONDS (ref 11.7–14.2)
RBC # BLD AUTO: 3.57 10*6/MM3 (ref 4.14–5.8)
SODIUM SERPL-SCNC: 142 MMOL/L (ref 136–145)
WBC NRBC COR # BLD: 11.59 10*3/MM3 (ref 3.4–10.8)

## 2022-03-31 PROCEDURE — 63710000001 INSULIN LISPRO (HUMAN) PER 5 UNITS: Performed by: STUDENT IN AN ORGANIZED HEALTH CARE EDUCATION/TRAINING PROGRAM

## 2022-03-31 PROCEDURE — 85610 PROTHROMBIN TIME: CPT | Performed by: STUDENT IN AN ORGANIZED HEALTH CARE EDUCATION/TRAINING PROGRAM

## 2022-03-31 PROCEDURE — 94664 DEMO&/EVAL PT USE INHALER: CPT

## 2022-03-31 PROCEDURE — 97168 OT RE-EVAL EST PLAN CARE: CPT

## 2022-03-31 PROCEDURE — 25010000002 ENOXAPARIN PER 10 MG: Performed by: STUDENT IN AN ORGANIZED HEALTH CARE EDUCATION/TRAINING PROGRAM

## 2022-03-31 PROCEDURE — 97164 PT RE-EVAL EST PLAN CARE: CPT

## 2022-03-31 PROCEDURE — 94799 UNLISTED PULMONARY SVC/PX: CPT

## 2022-03-31 PROCEDURE — 85027 COMPLETE CBC AUTOMATED: CPT | Performed by: STUDENT IN AN ORGANIZED HEALTH CARE EDUCATION/TRAINING PROGRAM

## 2022-03-31 PROCEDURE — 97530 THERAPEUTIC ACTIVITIES: CPT

## 2022-03-31 PROCEDURE — 63710000001 INSULIN ISOPHANE HUMAN PER 5 UNITS: Performed by: HOSPITALIST

## 2022-03-31 PROCEDURE — 97535 SELF CARE MNGMENT TRAINING: CPT

## 2022-03-31 PROCEDURE — 80053 COMPREHEN METABOLIC PANEL: CPT | Performed by: STUDENT IN AN ORGANIZED HEALTH CARE EDUCATION/TRAINING PROGRAM

## 2022-03-31 PROCEDURE — 82962 GLUCOSE BLOOD TEST: CPT

## 2022-03-31 PROCEDURE — 99232 SBSQ HOSP IP/OBS MODERATE 35: CPT | Performed by: INTERNAL MEDICINE

## 2022-03-31 RX ORDER — VANCOMYCIN HYDROCHLORIDE 125 MG/1
125 CAPSULE ORAL DAILY
Status: DISCONTINUED | OUTPATIENT
Start: 2022-03-31 | End: 2022-04-04 | Stop reason: HOSPADM

## 2022-03-31 RX ORDER — SODIUM CHLORIDE 9 MG/ML
100 INJECTION, SOLUTION INTRAVENOUS CONTINUOUS
Status: DISCONTINUED | OUTPATIENT
Start: 2022-03-31 | End: 2022-04-01

## 2022-03-31 RX ADMIN — METOPROLOL SUCCINATE 12.5 MG: 25 TABLET, EXTENDED RELEASE ORAL at 08:22

## 2022-03-31 RX ADMIN — FAMOTIDINE 20 MG: 20 TABLET ORAL at 18:28

## 2022-03-31 RX ADMIN — IPRATROPIUM BROMIDE AND ALBUTEROL SULFATE 3 ML: 2.5; .5 SOLUTION RESPIRATORY (INHALATION) at 20:48

## 2022-03-31 RX ADMIN — DIGOXIN 62.5 MCG: 125 TABLET ORAL at 13:35

## 2022-03-31 RX ADMIN — VANCOMYCIN HYDROCHLORIDE 125 MG: 125 CAPSULE ORAL at 08:23

## 2022-03-31 RX ADMIN — GUAIFENESIN 1200 MG: 600 TABLET, EXTENDED RELEASE ORAL at 20:07

## 2022-03-31 RX ADMIN — SODIUM BICARBONATE 1300 MG: 650 TABLET ORAL at 08:22

## 2022-03-31 RX ADMIN — Medication 1 CAPSULE: at 08:22

## 2022-03-31 RX ADMIN — IPRATROPIUM BROMIDE AND ALBUTEROL SULFATE 3 ML: 2.5; .5 SOLUTION RESPIRATORY (INHALATION) at 10:59

## 2022-03-31 RX ADMIN — INSULIN HUMAN 10 UNITS: 100 INJECTION, SUSPENSION SUBCUTANEOUS at 18:28

## 2022-03-31 RX ADMIN — IPRATROPIUM BROMIDE AND ALBUTEROL SULFATE 3 ML: 2.5; .5 SOLUTION RESPIRATORY (INHALATION) at 15:07

## 2022-03-31 RX ADMIN — SODIUM BICARBONATE 1300 MG: 650 TABLET ORAL at 20:07

## 2022-03-31 RX ADMIN — Medication 10 ML: at 20:08

## 2022-03-31 RX ADMIN — ENOXAPARIN SODIUM 70 MG: 100 INJECTION SUBCUTANEOUS at 08:22

## 2022-03-31 RX ADMIN — IPRATROPIUM BROMIDE AND ALBUTEROL SULFATE 3 ML: 2.5; .5 SOLUTION RESPIRATORY (INHALATION) at 06:58

## 2022-03-31 RX ADMIN — SODIUM BICARBONATE 1300 MG: 650 TABLET ORAL at 18:30

## 2022-03-31 RX ADMIN — INSULIN LISPRO 3 UNITS: 100 INJECTION, SOLUTION INTRAVENOUS; SUBCUTANEOUS at 18:28

## 2022-03-31 RX ADMIN — Medication 10 ML: at 08:23

## 2022-03-31 RX ADMIN — MAGNESIUM OXIDE 400 MG (241.3 MG MAGNESIUM) TABLET 400 MG: TABLET at 20:07

## 2022-03-31 RX ADMIN — SODIUM CHLORIDE 100 ML/HR: 9 INJECTION, SOLUTION INTRAVENOUS at 18:28

## 2022-03-31 RX ADMIN — MAGNESIUM OXIDE 400 MG (241.3 MG MAGNESIUM) TABLET 400 MG: TABLET at 08:22

## 2022-03-31 RX ADMIN — ENOXAPARIN SODIUM 70 MG: 100 INJECTION SUBCUTANEOUS at 20:06

## 2022-03-31 RX ADMIN — GUAIFENESIN 1200 MG: 600 TABLET, EXTENDED RELEASE ORAL at 08:22

## 2022-04-01 LAB
ALBUMIN SERPL-MCNC: 1.9 G/DL (ref 3.5–5.2)
ALBUMIN/GLOB SERPL: 0.6 G/DL
ALP SERPL-CCNC: 151 U/L (ref 39–117)
ALT SERPL W P-5'-P-CCNC: 12 U/L (ref 1–41)
ANION GAP SERPL CALCULATED.3IONS-SCNC: 6.1 MMOL/L (ref 5–15)
AST SERPL-CCNC: 30 U/L (ref 1–40)
BILIRUB SERPL-MCNC: 0.2 MG/DL (ref 0–1.2)
BUN SERPL-MCNC: 22 MG/DL (ref 8–23)
BUN/CREAT SERPL: 17.6 (ref 7–25)
CALCIUM SPEC-SCNC: 7.4 MG/DL (ref 8.6–10.5)
CHLORIDE SERPL-SCNC: 112 MMOL/L (ref 98–107)
CO2 SERPL-SCNC: 23.9 MMOL/L (ref 22–29)
CREAT SERPL-MCNC: 1.25 MG/DL (ref 0.76–1.27)
DEPRECATED RDW RBC AUTO: 46.7 FL (ref 37–54)
EGFRCR SERPLBLD CKD-EPI 2021: 56.8 ML/MIN/1.73
ERYTHROCYTE [DISTWIDTH] IN BLOOD BY AUTOMATED COUNT: 15.9 % (ref 12.3–15.4)
GLOBULIN UR ELPH-MCNC: 3.3 GM/DL
GLUCOSE BLDC GLUCOMTR-MCNC: 163 MG/DL (ref 70–130)
GLUCOSE BLDC GLUCOMTR-MCNC: 165 MG/DL (ref 70–130)
GLUCOSE BLDC GLUCOMTR-MCNC: 171 MG/DL (ref 70–130)
GLUCOSE BLDC GLUCOMTR-MCNC: 173 MG/DL (ref 70–130)
GLUCOSE BLDC GLUCOMTR-MCNC: 192 MG/DL (ref 70–130)
GLUCOSE BLDC GLUCOMTR-MCNC: 93 MG/DL (ref 70–130)
GLUCOSE SERPL-MCNC: 77 MG/DL (ref 65–99)
HCT VFR BLD AUTO: 28.5 % (ref 37.5–51)
HGB BLD-MCNC: 9 G/DL (ref 13–17.7)
INR PPP: 1.28 (ref 0.9–1.1)
MCH RBC QN AUTO: 25.9 PG (ref 26.6–33)
MCHC RBC AUTO-ENTMCNC: 31.6 G/DL (ref 31.5–35.7)
MCV RBC AUTO: 81.9 FL (ref 79–97)
PLATELET # BLD AUTO: 158 10*3/MM3 (ref 140–450)
PMV BLD AUTO: 11.6 FL (ref 6–12)
POTASSIUM SERPL-SCNC: 3.6 MMOL/L (ref 3.5–5.2)
PROT SERPL-MCNC: 5.2 G/DL (ref 6–8.5)
PROTHROMBIN TIME: 15.9 SECONDS (ref 11.7–14.2)
RBC # BLD AUTO: 3.48 10*6/MM3 (ref 4.14–5.8)
SODIUM SERPL-SCNC: 142 MMOL/L (ref 136–145)
WBC NRBC COR # BLD: 6.87 10*3/MM3 (ref 3.4–10.8)

## 2022-04-01 PROCEDURE — 97530 THERAPEUTIC ACTIVITIES: CPT

## 2022-04-01 PROCEDURE — 94799 UNLISTED PULMONARY SVC/PX: CPT

## 2022-04-01 PROCEDURE — 94664 DEMO&/EVAL PT USE INHALER: CPT

## 2022-04-01 PROCEDURE — 25010000002 ENOXAPARIN PER 10 MG: Performed by: STUDENT IN AN ORGANIZED HEALTH CARE EDUCATION/TRAINING PROGRAM

## 2022-04-01 PROCEDURE — 97535 SELF CARE MNGMENT TRAINING: CPT

## 2022-04-01 PROCEDURE — 94761 N-INVAS EAR/PLS OXIMETRY MLT: CPT

## 2022-04-01 PROCEDURE — 80053 COMPREHEN METABOLIC PANEL: CPT | Performed by: STUDENT IN AN ORGANIZED HEALTH CARE EDUCATION/TRAINING PROGRAM

## 2022-04-01 PROCEDURE — 99232 SBSQ HOSP IP/OBS MODERATE 35: CPT | Performed by: NURSE PRACTITIONER

## 2022-04-01 PROCEDURE — 82962 GLUCOSE BLOOD TEST: CPT

## 2022-04-01 PROCEDURE — 36415 COLL VENOUS BLD VENIPUNCTURE: CPT | Performed by: STUDENT IN AN ORGANIZED HEALTH CARE EDUCATION/TRAINING PROGRAM

## 2022-04-01 PROCEDURE — 85027 COMPLETE CBC AUTOMATED: CPT | Performed by: STUDENT IN AN ORGANIZED HEALTH CARE EDUCATION/TRAINING PROGRAM

## 2022-04-01 PROCEDURE — 85610 PROTHROMBIN TIME: CPT | Performed by: STUDENT IN AN ORGANIZED HEALTH CARE EDUCATION/TRAINING PROGRAM

## 2022-04-01 RX ADMIN — SODIUM BICARBONATE 1300 MG: 650 TABLET ORAL at 08:30

## 2022-04-01 RX ADMIN — Medication 10 ML: at 08:31

## 2022-04-01 RX ADMIN — MAGNESIUM OXIDE 400 MG (241.3 MG MAGNESIUM) TABLET 400 MG: TABLET at 20:07

## 2022-04-01 RX ADMIN — IPRATROPIUM BROMIDE AND ALBUTEROL SULFATE 3 ML: 2.5; .5 SOLUTION RESPIRATORY (INHALATION) at 07:58

## 2022-04-01 RX ADMIN — METOPROLOL SUCCINATE 12.5 MG: 25 TABLET, EXTENDED RELEASE ORAL at 08:30

## 2022-04-01 RX ADMIN — IPRATROPIUM BROMIDE AND ALBUTEROL SULFATE 3 ML: 2.5; .5 SOLUTION RESPIRATORY (INHALATION) at 11:13

## 2022-04-01 RX ADMIN — Medication 10 ML: at 20:08

## 2022-04-01 RX ADMIN — Medication 1 CAPSULE: at 08:30

## 2022-04-01 RX ADMIN — FERROUS SULFATE TAB 325 MG (65 MG ELEMENTAL FE) 325 MG: 325 (65 FE) TAB at 08:30

## 2022-04-01 RX ADMIN — DIGOXIN 62.5 MCG: 125 TABLET ORAL at 12:30

## 2022-04-01 RX ADMIN — IPRATROPIUM BROMIDE AND ALBUTEROL SULFATE 3 ML: 2.5; .5 SOLUTION RESPIRATORY (INHALATION) at 19:16

## 2022-04-01 RX ADMIN — GUAIFENESIN 1200 MG: 600 TABLET, EXTENDED RELEASE ORAL at 20:07

## 2022-04-01 RX ADMIN — GUAIFENESIN 1200 MG: 600 TABLET, EXTENDED RELEASE ORAL at 08:31

## 2022-04-01 RX ADMIN — ENOXAPARIN SODIUM 70 MG: 100 INJECTION SUBCUTANEOUS at 20:07

## 2022-04-01 RX ADMIN — IPRATROPIUM BROMIDE AND ALBUTEROL SULFATE 3 ML: 2.5; .5 SOLUTION RESPIRATORY (INHALATION) at 15:26

## 2022-04-01 RX ADMIN — FAMOTIDINE 20 MG: 20 TABLET ORAL at 06:32

## 2022-04-01 RX ADMIN — ENOXAPARIN SODIUM 70 MG: 100 INJECTION SUBCUTANEOUS at 08:31

## 2022-04-01 RX ADMIN — VANCOMYCIN HYDROCHLORIDE 125 MG: 125 CAPSULE ORAL at 08:31

## 2022-04-01 RX ADMIN — MAGNESIUM OXIDE 400 MG (241.3 MG MAGNESIUM) TABLET 400 MG: TABLET at 08:30

## 2022-04-01 NOTE — THERAPY TREATMENT NOTE
Patient Name: Francisco Sequeira  : 1935    MRN: [id number]                              Today's Date: 2022       Admit Date: 3/23/2022    Visit Dx:     ICD-10-CM ICD-9-CM   1. Sepsis, due to unspecified organism, unspecified whether acute organ dysfunction present (HCC)  A41.9 038.9     995.91   2. Fever in adult  R50.9 780.60   3. Nausea and vomiting in adult  R11.2 787.01   4. Abdominal pain, acute  R10.9 789.00     338.19   5. 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)   • Iron deficiency anemia   • Chronic anticoagulation   • Hemiparesis of left nondominant side as late effect of cerebral infarction (HCC)   • Rectus sheath hematoma   • Sepsis (HCC)   • Calculus of gallbladder with acute cholecystitis without obstruction   • History of Clostridium difficile infection   • Aspiration pneumonitis (HCC)     Past Medical History:   Diagnosis Date   • Allergic rhinitis    • Aortic valve insufficiency    • Ascending aortic aneurysm (HCC)    • Atrial fibrillation (HCC)    • Bacteremia    • Calcific aortic stenosis of bicuspid valve    • Cardiac arrest (HCC)    • Cardiomyopathy (HCC)    • CKD (chronic kidney disease) stage 3, GFR 30-59 ml/min (HCC)    • Contact dermatitis due to poison ivy    • Elbow fracture    • Esophageal reflux    • GERD (gastroesophageal reflux disease)    • Head injury    • History of transfusion    • Hyperglycemia    • Hyperlipidemia    • Hypertension    • Kidney carcinoma (HCC)    •  Nonischemic cardiomyopathy (HCC)    • Renal oncocytoma    • Seasonal allergic reaction    • Sinusitis    • Syncope    • Type 2 diabetes mellitus (HCC)     uncontrolled   • Visual field defect      Past Surgical History:   Procedure Laterality Date   • AORTIC VALVE REPAIR/REPLACEMENT     • ASCENDING AORTIC ANEURYSM REPAIR W/ MECHANICAL AORTIC VALVE REPLACEMENT     • CHOLECYSTECTOMY WITH INTRAOPERATIVE CHOLANGIOGRAM N/A 3/29/2022    Procedure: Laparoscopic cholecystectomy with cholangiogram, possible open;  Surgeon: Lisa Guidry MD;  Location: [facility] MAIN OR;  Service: General;  Laterality: N/A;   • COLONOSCOPY N/A 10/28/2021    Procedure: COLONOSCOPY to cecum:  cold snare polyps,;  Surgeon: Dinesh Meza MD;  Location: [facility] ENDOSCOPY;  Service: Gastroenterology;  Laterality: N/A;  pre:  Iron deficiency anemia  post:  polyps, diverticulosis,    • COLONOSCOPY N/A 11/9/2021    Procedure: COLONOSCOPY to cecum with APC cautery of AVM and clip placement x1;  Surgeon: Pavan Rodriguez MD;  Location: [facility] ENDOSCOPY;  Service: Gastroenterology;  Laterality: N/A;  PRE - gi bleed, anemia  POST - diverticulosis, poor prep, AVM right colon   • ENDOSCOPY N/A 10/28/2021    Procedure: ESOPHAGOGASTRODUODENOSCOPY with biopsies;  Surgeon: Dinesh Meza MD;  Location: [facility] ENDOSCOPY;  Service: Gastroenterology;  Laterality: N/A;  pre:  Iron deficiency anemia  post:  duodenitis and gastritis   • EYE SURGERY  12/09/2020    cataract surgery    • NEPHRECTOMY     • OTHER SURGICAL HISTORY      elbow surgery   • OTHER SURGICAL HISTORY      right arm surgery   • PROSTATE SURGERY     • THORACENTESIS Left     diagnostic      General Information     Row Name 04/01/22 1218          Physical Therapy Time and Intention    Document Type therapy note (daily note)  -CB     Mode of Treatment co-treatment;physical therapy;occupational therapy  -CB     Row Name 04/01/22 1218          General Information    Patient Profile  Reviewed yes  -CB     Existing Precautions/Restrictions fall  -CB     Row Name 04/01/22 1218          Cognition    Orientation Status (Cognition) oriented x 4  -CB     Row Name 04/01/22 1218          Safety Issues, Functional Mobility    Impairments Affecting Function (Mobility) balance;strength;endurance/activity tolerance;postural/trunk control  -CB           User Key  (r) = Recorded By, (t) = Taken By, (c) = Cosigned By    Initials Name Provider Type    CB Brittnee Ocampo, PT Physical Therapist               Mobility     Row Name 04/01/22 1219          Bed Mobility    Bed Mobility supine-sit  -CB     Supine-Sit Power (Bed Mobility) standby assist;verbal cues  -CB     Assistive Device (Bed Mobility) head of bed elevated;bed rails  -CB     Row Name 04/01/22 1219          Sit-Stand Transfer    Sit-Stand Power (Transfers) contact guard;2 person assist;verbal cues;nonverbal cues (demo/gesture)  -CB     Assistive Device (Sit-Stand Transfers) walker, front-wheeled  -CB     Comment, (Sit-Stand Transfer) modA from STS from low toilet  -CB     Row Name 04/01/22 1219          Gait/Stairs (Locomotion)    Power Level (Gait) contact guard;verbal cues;nonverbal cues (demo/gesture);2 person assist  -CB     Assistive Device (Gait) walker, front-wheeled  -CB     Distance in Feet (Gait) 120ft  -CB     Deviations/Abnormal Patterns (Gait) gait speed decreased;stride length decreased  -CB     Bilateral Gait Deviations forward flexed posture  -CB     Comment, (Gait/Stairs) cues for upright posture  -CB           User Key  (r) = Recorded By, (t) = Taken By, (c) = Cosigned By    Initials Name Provider Type    Brittnee Martin, PT Physical Therapist               Obj/Interventions     Row Name 04/01/22 1220          Balance    Balance Assessment sitting static balance;sitting dynamic balance;standing static balance;standing dynamic balance  -CB     Static Sitting Balance standby assist;verbal cues;non-verbal cues  (demo/gesture)  -CB     Dynamic Sitting Balance standby assist;verbal cues;non-verbal cues (demo/gesture)  -CB     Position, Sitting Balance unsupported;sitting edge of bed  -CB     Static Standing Balance contact guard;verbal cues;non-verbal cues (demo/gesture)  -CB     Dynamic Standing Balance contact guard;verbal cues;non-verbal cues (demo/gesture)  -CB     Position/Device Used, Standing Balance supported;walker, rolling  -CB     Balance Interventions sitting;standing;sit to stand;supported;static;minimal challenge;dynamic  -CB           User Key  (r) = Recorded By, (t) = Taken By, (c) = Cosigned By    Initials Name Provider Type    Brittnee Martin, PT Physical Therapist               Goals/Plan    No documentation.                Clinical Impression     Row Name 04/01/22 1221          Pain    Pretreatment Pain Rating 0/10 - no pain  -CB     Posttreatment Pain Rating 0/10 - no pain  -CB     Row Name 04/01/22 1221          Plan of Care Review    Plan of Care Reviewed With patient;spouse  -CB     Progress improving  -CB     Outcome Evaluation Patient seen by PT/OT this AM and demonstrated improved functional mobility. He required SBA for bed mobility and STS from EOB to rwx with CGA. He did require modA for STS from low toilet and ambulated 120ft using rwx requiring CGA. Cues for upright posture during ambulation. Pt is very motivated to return to home setting. Pt encouraged to ambulate with nsg to restroom and in hallway over the weekend. Will continue to progress pt as he tolerates.  -CB     Row Name 04/01/22 1221          Positioning and Restraints    Pre-Treatment Position in bed  -CB     Post Treatment Position chair  -CB     In Chair notified nsg;sitting;call light within reach;encouraged to call for assist;exit alarm on;with family/caregiver  -CB           User Key  (r) = Recorded By, (t) = Taken By, (c) = Cosigned By    Initials Name Provider Type    Brittnee Martin, PT Physical Therapist                Outcome Measures     Row Name 04/01/22 1224          How much help from another person do you currently need...    Turning from your back to your side while in flat bed without using bedrails? 3  -CB     Moving from lying on back to sitting on the side of a flat bed without bedrails? 3  -CB     Moving to and from a bed to a chair (including a wheelchair)? 3  -CB     Standing up from a chair using your arms (e.g., wheelchair, bedside chair)? 3  -CB     Climbing 3-5 steps with a railing? 2  -CB     To walk in hospital room? 3  -CB     AM-PAC 6 Clicks Score (PT) 17  -CB     Row Name 04/01/22 1224          Functional Assessment    Outcome Measure Options AM-PAC 6 Clicks Basic Mobility (PT)  -CB           User Key  (r) = Recorded By, (t) = Taken By, (c) = Cosigned By    Initials Name Provider Type    Brittnee Martin, PT Physical Therapist                             Physical Therapy Education                 Title: PT OT SLP Therapies (In Progress)     Topic: Physical Therapy (In Progress)     Point: Mobility training (Done)     Learning Progress Summary           Patient Acceptance, E,TB,D, VU,NR by CB at 4/1/2022 1224    Acceptance, E,TB,D, VU,NR by CB at 3/31/2022 1223    Acceptance, E,TB, NR by AS at 3/31/2022 0327    Acceptance, E,TB,D, VU,NR by CB at 3/28/2022 1149    Acceptance, E,TB,D, VU,NR by CB at 3/26/2022 1557    Acceptance, E, NL,NR by JS at 3/26/2022 0337    Acceptance, E, VU by JS at 3/25/2022 0316    Acceptance, E,TB,D, VU,NR by CB at 3/24/2022 1030                   Point: Home exercise program (In Progress)     Learning Progress Summary           Patient Acceptance, E,TB, NR by AS at 3/31/2022 0327    Acceptance, E,TB,D, VU,NR by CB at 3/28/2022 1149    Acceptance, E, NL,NR by JS at 3/26/2022 0337    Acceptance, E, VU by JS at 3/25/2022 0316                   Point: Body mechanics (Done)     Learning Progress Summary           Patient Acceptance, E,TB,D, VU,NR by CB at 4/1/2022 1224    Acceptance,  E,TB,D, VU,NR by CB at 3/31/2022 1223    Acceptance, E,TB, NR by AS at 3/31/2022 0327    Acceptance, E,TB,D, VU,NR by CB at 3/28/2022 1149    Acceptance, E,TB,D, VU,NR by CB at 3/26/2022 1557    Acceptance, E, NL,NR by JS at 3/26/2022 0337    Acceptance, E, VU by JS at 3/25/2022 0316                   Point: Precautions (Done)     Learning Progress Summary           Patient Acceptance, E,TB,D, VU,NR by CB at 4/1/2022 1224    Acceptance, E,TB,D, VU,NR by CB at 3/31/2022 1223    Acceptance, E,TB, NR by AS at 3/31/2022 0327    Acceptance, E,TB,D, VU,NR by CB at 3/28/2022 1149    Acceptance, E,TB,D, VU,NR by CB at 3/26/2022 1557    Acceptance, E, NL,NR by JS at 3/26/2022 0337    Acceptance, E, VU by JS at 3/25/2022 0316                               User Key     Initials Effective Dates Name Provider Type Discipline    CB 10/22/21 -  Brittnee Ocampo, CICI Physical Therapist PT    JS 09/10/21 -  Parvin Lares, RN Registered Nurse Nurse    AS 01/31/22 -  Sarah Elizabeth, RN Registered Nurse Nurse              PT Recommendation and Plan  Planned Therapy Interventions (PT): balance training, bed mobility training, gait training, home exercise program, patient/family education, strengthening, transfer training  Plan of Care Reviewed With: patient, spouse  Progress: improving  Outcome Evaluation: Patient seen by PT/OT this AM and demonstrated improved functional mobility. He required SBA for bed mobility and STS from EOB to rwx with CGA. He did require modA for STS from low toilet and ambulated 120ft using rwx requiring CGA. Cues for upright posture during ambulation. Pt is very motivated to return to home setting. Pt encouraged to ambulate with nsg to restroom and in hallway over the weekend. Will continue to progress pt as he tolerates.     Time Calculation:    PT Charges     Row Name 04/01/22 1224             Time Calculation    Start Time 0939  -CB      Stop Time 1004  -CB      Time Calculation (min) 25 min  -CB               Time Calculation- PT    Total Timed Code Minutes- PT 25 minute(s)  -CB              Timed Charges    77981 - PT Therapeutic Activity Minutes 25  -CB              Total Minutes    Timed Charges Total Minutes 25  -CB       Total Minutes 25  -CB            User Key  (r) = Recorded By, (t) = Taken By, (c) = Cosigned By    Initials Name Provider Type    CB Brittnee Ocampo, PT Physical Therapist              Therapy Charges for Today     Code Description Service Date Service Provider Modifiers Qty    [id number]  PT THERAPEUTIC ACT EA 15 MIN 3/31/2022 Brittnee Ocampo, PT GP 1    [id number]  PT RE-EVAL ESTABLISHED PLAN 2 3/31/2022 Brittnee Ocampo, PT GP 1    [id number]  PT THERAPEUTIC ACT EA 15 MIN 4/1/2022 Brittnee Ocampo, PT GP 2          PT G-Codes  Outcome Measure Options: AM-PAC 6 Clicks Basic Mobility (PT)  AM-PAC 6 Clicks Score (PT): 17  AM-PAC 6 Clicks Score (OT): 21  Modified [place] Scale: 3 - Moderate disability.  Requiring some help, but able to walk without assistance.    Brittnee Ocampo PT  4/1/2022

## 2022-04-01 NOTE — PLAN OF CARE
Goal Outcome Evaluation:  Plan of Care Reviewed With: patient, spouse        Progress: improving  Outcome Evaluation: patient's BP improved today from yesterday. IVF started at 100ml/hr per MAR. Asked wife to bring in patient insuling pump per order. Did work with PT today, sat up in chair. No c/o pain. Will CTM the rest of my shift.

## 2022-04-01 NOTE — PLAN OF CARE
Goal Outcome Evaluation:  Plan of Care Reviewed With: patient        Progress: improving  Outcome Evaluation: Overall feeling better today, MARIAN drain 370 out this shift, removed at 1700 per order. No complaints of pain. BG obtained per order before lunch 165 on our accu check, 162 on Vance Free style, after lunch 173 on ours, 147 on Vance Free style. Before dinner 192 on ours, 159 on Vance Free style. After dinner 171 on ours, 141 on Vance free style. Sliding scale insulin discontinued. IVF d/c'd per order. Will CTM the rest of my shift.

## 2022-04-01 NOTE — PLAN OF CARE
Goal Outcome Evaluation:  Plan of Care Reviewed With: patient, spouse        Progress: improving  Outcome Evaluation: Patient seen by PT/OT this AM and demonstrated improved functional mobility. He required SBA for bed mobility and STS from EOB to rwx with CGA. He did require modA for STS from low toilet and ambulated 120ft using rwx requiring CGA. Cues for upright posture during ambulation. Pt is very motivated to return to home setting. Pt encouraged to ambulate with nsg to restroom and in hallway over the weekend. Will continue to progress pt as he tolerates.

## 2022-04-01 NOTE — PROGRESS NOTES
"General Surgery Progress Note    Summary:  Mr. Francisco Sequeira is a 86 y.o. year old gentleman POD3 s/p laparoscopic cholecystectomy with cholangiogram.  Continues to improve.  Diet as tolerated.  DC MARIAN drain today. Ok for therapeutic anticoagulation from general surgery standpoint.    Chief Complaint: abdominal pain    Interval Events: No acute events overnight.  Continues to improve daily.  Tolerating a diet.  Worked with therapy today.    Drain output 240cc, thin, ascites    Vitals: /63 (BP Location: Left arm, Patient Position: Lying)   Pulse 70   Temp 97.5 °F (36.4 °C) (Oral)   Resp 18   Ht 182.9 cm (72\")   Wt 71.9 kg (158 lb 8.2 oz)   SpO2 98%   BMI 21.50 kg/m²     GENERAL: alert, frail appearing but looks more comfortable today, and in no distress  HEENT: normocephalic, atraumatic, moist mucous membranes, clear sclerae   CHEST: on 2L NC, no increased work of breathing, symmetric air entry  CARDIAC: regular rate and rhythm    ABDOMEN: Soft, nondistended, incisions clean and dry, MARIAN serosanguinous   EXTREMITIES: no cyanosis, clubbing, or edema   SKIN: Warm and moist, no rashes    Labs:  Results from last 7 days   Lab Units 04/01/22  0502 03/31/22  0452 03/30/22  1625   WBC 10*3/mm3 6.87 11.59* 3.13*   HEMOGLOBIN g/dL 9.0* 9.1* 9.8*   HEMATOCRIT % 28.5* 29.4* 31.2*   PLATELETS 10*3/mm3 158 170 134*   MONOCYTES % %  --   --  2.0*     Results from last 7 days   Lab Units 04/01/22  0502 03/31/22  0452 03/30/22  1625 03/30/22  0559   SODIUM mmol/L 142 142 141 142   POTASSIUM mmol/L 3.6 3.9 4.0 3.9   CHLORIDE mmol/L 112* 111* 109* 110*   CO2 mmol/L 23.9 22.9 21.0* 21.2*   BUN mg/dL 22 24* 20 16   CREATININE mg/dL 1.25 1.53* 1.36* 1.07   CALCIUM mg/dL 7.4* 7.5* 7.4* 7.5*   BILIRUBIN mg/dL 0.2 0.3  --  0.4   ALK PHOS U/L 151* 140*  --  146*   ALT (SGPT) U/L 12 12  --  10   AST (SGOT) U/L 30 35  --  31   GLUCOSE mg/dL 77 144* 159* 159*     Results from last 7 days   Lab Units 04/01/22  0502 03/31/22  2089 " 03/30/22  0559   INR  1.28* 1.35* 1.31*       MOIRA MERCADO MD  General and Endoscopic Surgery  [facility]    [address], [address]  [place], [place], [address]  P: [phone]  F: [phone]

## 2022-04-01 NOTE — PLAN OF CARE
Goal Outcome Evaluation:  Plan of Care Reviewed With: patient, spouse  Progress: improving  Outcome Evaluation: Pt was found supine in bed, alert and agreeable to OT/PT co treatment. He demo's improved mobility and act tolerance today. SBA for bed mobility and CGA for STS using RW. Fxl ambulation into bathroom with CGA, ModA for toilet TF d/t low seat height and use of GB. Pt performs fxl ambulation in hallway to simulate household mobility and to increase act tolerance, no LOB during activity. TF to bedside recliner and left with all needs in reach.  Therapist used appropriate personal protective equipment including mask, gloves, and eye protection. Hand hygiene was completed before and after therapy session.

## 2022-04-01 NOTE — THERAPY TREATMENT NOTE
Patient Name: Francisco Sequeira  : 1935    MRN: [id number]                              Today's Date: 2022       Admit Date: 3/23/2022    Visit Dx:     ICD-10-CM ICD-9-CM   1. Sepsis, due to unspecified organism, unspecified whether acute organ dysfunction present (HCC)  A41.9 038.9     995.91   2. Fever in adult  R50.9 780.60   3. Nausea and vomiting in adult  R11.2 787.01   4. Abdominal pain, acute  R10.9 789.00     338.19   5. 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)   • Iron deficiency anemia   • Chronic anticoagulation   • Hemiparesis of left nondominant side as late effect of cerebral infarction (HCC)   • Rectus sheath hematoma   • Sepsis (HCC)   • Calculus of gallbladder with acute cholecystitis without obstruction   • History of Clostridium difficile infection   • Aspiration pneumonitis (HCC)     Past Medical History:   Diagnosis Date   • Allergic rhinitis    • Aortic valve insufficiency    • Ascending aortic aneurysm (HCC)    • Atrial fibrillation (HCC)    • Bacteremia    • Calcific aortic stenosis of bicuspid valve    • Cardiac arrest (HCC)    • Cardiomyopathy (HCC)    • CKD (chronic kidney disease) stage 3, GFR 30-59 ml/min (HCC)    • Contact dermatitis due to poison ivy    • Elbow fracture    • Esophageal reflux    • GERD (gastroesophageal reflux disease)    • Head injury    • History of transfusion    • Hyperglycemia    • Hyperlipidemia    • Hypertension    • Kidney carcinoma (HCC)    •  Nonischemic cardiomyopathy (HCC)    • Renal oncocytoma    • Seasonal allergic reaction    • Sinusitis    • Syncope    • Type 2 diabetes mellitus (HCC)     uncontrolled   • Visual field defect      Past Surgical History:   Procedure Laterality Date   • AORTIC VALVE REPAIR/REPLACEMENT     • ASCENDING AORTIC ANEURYSM REPAIR W/ MECHANICAL AORTIC VALVE REPLACEMENT     • CHOLECYSTECTOMY WITH INTRAOPERATIVE CHOLANGIOGRAM N/A 3/29/2022    Procedure: Laparoscopic cholecystectomy with cholangiogram, possible open;  Surgeon: Lisa Guidry MD;  Location: [facility] MAIN OR;  Service: General;  Laterality: N/A;   • COLONOSCOPY N/A 10/28/2021    Procedure: COLONOSCOPY to cecum:  cold snare polyps,;  Surgeon: Dinesh Meza MD;  Location: [facility] ENDOSCOPY;  Service: Gastroenterology;  Laterality: N/A;  pre:  Iron deficiency anemia  post:  polyps, diverticulosis,    • COLONOSCOPY N/A 11/9/2021    Procedure: COLONOSCOPY to cecum with APC cautery of AVM and clip placement x1;  Surgeon: Pavan Rodriguez MD;  Location: [facility] ENDOSCOPY;  Service: Gastroenterology;  Laterality: N/A;  PRE - gi bleed, anemia  POST - diverticulosis, poor prep, AVM right colon   • ENDOSCOPY N/A 10/28/2021    Procedure: ESOPHAGOGASTRODUODENOSCOPY with biopsies;  Surgeon: Dinesh Meza MD;  Location: [facility] ENDOSCOPY;  Service: Gastroenterology;  Laterality: N/A;  pre:  Iron deficiency anemia  post:  duodenitis and gastritis   • EYE SURGERY  12/09/2020    cataract surgery    • NEPHRECTOMY     • OTHER SURGICAL HISTORY      elbow surgery   • OTHER SURGICAL HISTORY      right arm surgery   • PROSTATE SURGERY     • THORACENTESIS Left     diagnostic      General Information     Row Name 04/01/22 1237          OT Time and Intention    Document Type therapy note (daily note)  -JW     Mode of Treatment co-treatment;occupational therapy;physical therapy  -     Row Name 04/01/22 1237          General Information    Patient Profile Reviewed yes   -     Existing Precautions/Restrictions fall  -     Row Name 04/01/22 1237          Cognition    Orientation Status (Cognition) oriented x 4  -     Row Name 04/01/22 [facility]          Safety Issues, Functional Mobility    Impairments Affecting Function (Mobility) balance;strength;endurance/activity tolerance;postural/trunk control  -           User Key  (r) = Recorded By, (t) = Taken By, (c) = Cosigned By    Initials Name Provider Type     Sujey Herron OT Occupational Therapist                 Mobility/ADL's     Row Name 04/01/22 [facility]          Bed Mobility    Bed Mobility supine-sit  -     Supine-Sit Humphrey (Bed Mobility) standby assist;verbal cues  -     Assistive Device (Bed Mobility) head of bed elevated;bed rails  -     Row Name 04/01/22 [facility]          Transfers    Transfers sit-stand transfer;toilet transfer  -     Sit-Stand Humphrey (Transfers) contact guard;2 person assist;verbal cues;nonverbal cues (demo/gesture)  -     Stand-Sit Humphrey (Transfers) contact guard;verbal cues  -     Humphrey Level (Toilet Transfer) moderate assist (50% patient effort);verbal cues  -     Assistive Device (Toilet Transfer) grab bars/safety frame;walker, front-wheeled  -     Row Name 04/01/22 [facility]          Sit-Stand Transfer    Assistive Device (Sit-Stand Transfers) walker, front-wheeled  -     Row Name 04/01/22 [facility]          Toilet Transfer    Type (Toilet Transfer) stand pivot/stand step  -     Comment, (Toilet Transfer) ModA from low seat height  -     Row Name 04/01/22 [facility]          Functional Mobility    Functional Mobility- Ind. Level contact guard assist  -     Functional Mobility- Device rolling walker  -     Functional Mobility- Comment CGA using RW for fxl ambulation around room and hallway to simulate household mobility  -           User Key  (r) = Recorded By, (t) = Taken By, (c) = Cosigned By    Initials Name Provider Type     Sujey Herron OT Occupational  Therapist               Obj/Interventions     Row Name 04/01/22 1239          Balance    Static Sitting Balance supervision  -     Dynamic Sitting Balance standby assist  -     Position, Sitting Balance sitting edge of bed  -     Static Standing Balance contact guard  -     Dynamic Standing Balance contact guard  -     Position/Device Used, Standing Balance supported;walker, rolling  -     Balance Interventions sitting;standing;occupation based/functional task  -           User Key  (r) = Recorded By, (t) = Taken By, (c) = Cosigned By    Initials Name Provider Type    Sujey Starkey OT Occupational Therapist               Goals/Plan    No documentation.                Clinical Impression     Row Name 04/01/22 1239          Pain Assessment    Pretreatment Pain Rating 0/10 - no pain  -     Posttreatment Pain Rating 0/10 - no pain  -     Row Name 04/01/22 1239          Plan of Care Review    Plan of Care Reviewed With patient;spouse  -     Progress improving  -     Outcome Evaluation Pt was found supine in bed, alert and agreeable to OT/PT co treatment. He demo's improved mobility and act tolerance today. SBA for bed mobility and CGA for STS using RW. Fxl ambulation into bathroom with CGA, ModA for toilet TF d/t low seat height and use of GB. Pt performs fxl ambulation in hallway to simulate household mobility and to increase act tolerance, no LOB during activity. TF to bedside recliner and left with all needs in reach.  -     Row Name 04/01/22 1239          Positioning and Restraints    Pre-Treatment Position in bed  -     Post Treatment Position chair  -JW     In Chair sitting;call light within reach;encouraged to call for assist;exit alarm on;with family/caregiver;legs elevated  -           User Key  (r) = Recorded By, (t) = Taken By, (c) = Cosigned By    Initials Name Provider Type    Sujey Starkey OT Occupational Therapist               Outcome Measures     Row Name 04/01/22 1227           How much help from another person do you currently need...    Turning from your back to your side while in flat bed without using bedrails? 3  -CB     Moving from lying on back to sitting on the side of a flat bed without bedrails? 3  -CB     Moving to and from a bed to a chair (including a wheelchair)? 3  -CB     Standing up from a chair using your arms (e.g., wheelchair, bedside chair)? 3  -CB     Climbing 3-5 steps with a railing? 2  -CB     To walk in hospital room? 3  -CB     AM-PAC 6 Clicks Score (PT) 17  -CB     Row Name 04/01/22 1224          Functional Assessment    Outcome Measure Options AM-PAC 6 Clicks Basic Mobility (PT)  -CB           User Key  (r) = Recorded By, (t) = Taken By, (c) = Cosigned By    Initials Name Provider Type    Brittnee Martin, PT Physical Therapist                Occupational Therapy Education                 Title: PT OT SLP Therapies (In Progress)     Topic: Occupational Therapy (In Progress)     Point: ADL training (In Progress)     Description:   Instruct learner(s) on proper safety adaptation and remediation techniques during self care or transfers.   Instruct in proper use of assistive devices.              Learning Progress Summary           Patient Acceptance, E,TB, NR by AS at 3/31/2022 0327    Acceptance, E, NL,NR by JS at 3/26/2022 0337    Acceptance, E, VU by JS at 3/25/2022 0316                   Point: Home exercise program (In Progress)     Description:   Instruct learner(s) on appropriate technique for monitoring, assisting and/or progressing therapeutic exercises/activities.              Learning Progress Summary           Patient Acceptance, E,TB, NR by AS at 3/31/2022 0327    Acceptance, E, NL,NR by JS at 3/26/2022 0337    Acceptance, E, VU by JS at 3/25/2022 0316                   Point: Precautions (In Progress)     Description:   Instruct learner(s) on prescribed precautions during self-care and functional transfers.              Learning Progress  Summary           Patient Acceptance, E,TB, NR by AS at 3/31/2022 0327    Acceptance, E, NL,NR by  at 3/26/2022 0337    Acceptance, E, VU by  at 3/25/2022 0316                   Point: Body mechanics (In Progress)     Description:   Instruct learner(s) on proper positioning and spine alignment during self-care, functional mobility activities and/or exercises.              Learning Progress Summary           Patient Acceptance, E,TB, NR by AS at 3/31/2022 0327    Acceptance, E, NL,NR by JS at 3/26/2022 0337    Acceptance, E, VU by JS at 3/25/2022 0316                               User Key     Initials Effective Dates Name Provider Type Discipline     09/10/21 -  Parvin Lares, RN Registered Nurse Nurse    AS 01/31/22 -  Sarah Elizabeth, RN Registered Nurse Nurse              OT Recommendation and Plan     Plan of Care Review  Plan of Care Reviewed With: patient, spouse  Progress: improving  Outcome Evaluation: Pt was found supine in bed, alert and agreeable to OT/PT co treatment. He demo's improved mobility and act tolerance today. SBA for bed mobility and CGA for STS using RW. Fxl ambulation into bathroom with CGA, ModA for toilet TF d/t low seat height and use of GB. Pt performs fxl ambulation in hallway to simulate household mobility and to increase act tolerance, no LOB during activity. TF to bedside recliner and left with all needs in reach.     Time Calculation:    Time Calculation- OT     Row Name 04/01/22 1244             Time Calculation- OT    OT Start Time 0933  -JW      OT Stop Time 1003  -      OT Time Calculation (min) 30 min  -JW      Total Timed Code Minutes- OT 30 minute(s)  -JW      OT Received On 04/01/22  -JW      OT - Next Appointment 04/04/22  -              Timed Charges    70149 - OT Therapeutic Activity Minutes 10  -JW      75516 - OT Self Care/Mgmt Minutes 20  -JW              Total Minutes    Timed Charges Total Minutes 30  -JW       Total Minutes 30  -JW            User  Key  (r) = Recorded By, (t) = Taken By, (c) = Cosigned By    Initials Name Provider Type     Sujey Herron OT Occupational Therapist              Therapy Charges for Today     Code Description Service Date Service Provider Modifiers Qty    82370662411  OT THERAPEUTIC ACT EA 15 MIN 4/1/2022 Sujey Herron OT GO 1    [id number]  OT SELF CARE/MGMT/TRAIN EA 15 MIN 4/1/2022 Sujey Herron OT GO 1               Sujey Herron OT  4/1/2022

## 2022-04-01 NOTE — NURSING NOTE
Before lunch:  165 -Accu-check    162- Vance Free style    After lunch: 173- Accu-check           147- Vance Free Style

## 2022-04-01 NOTE — PROGRESS NOTES
"DAILY PROGRESS NOTE  [facility]    Patient Identification:  Name: Francisco Sequeira  Age: 86 y.o.  Sex: male  :  1935  MRN: [id number]         Primary Care Physician: Rubin Hinkle APRN      Subjective  Patient presently has no acute complaints.  Stating he is feeling well.  Tolerating oral intake and fluids very nicely.    Objective:  General Appearance:  Comfortable, well-appearing, in no acute distress and not in pain.    Vital signs: (most recent): Blood pressure 129/65, pulse 81, temperature 97.3 °F (36.3 °C), temperature source Oral, resp. rate 18, height 182.9 cm (72\"), weight 71.9 kg (158 lb 8.2 oz), SpO2 100 %.    Lungs:  Normal effort and normal respiratory rate.  Breath sounds clear to auscultation.    Heart: Normal rate.  Irregular rhythm.    Abdomen: Abdomen is soft and non-distended.    Extremities: There is no dependent edema.    Neurological: Patient is alert and oriented to person, place and time.    Skin:  Warm and dry.                Vital signs in last 24 hours:  Temp:  [97.3 °F (36.3 °C)-97.4 °F (36.3 °C)] 97.3 °F (36.3 °C)  Heart Rate:  [71-91] 81  Resp:  [18] 18  BP: (105-129)/(62-68) 129/65    Intake/Output:    Intake/Output Summary (Last 24 hours) at 2022 1047  Last data filed at 2022 0906  Gross per 24 hour   Intake 220 ml   Output 800 ml   Net -580 ml         Results from last 7 days   Lab Units 22  0502 22  0452 22  1625 22  0559 22  0510 22  0541 22  0450   WBC 10*3/mm3 6.87 11.59* 3.13* 7.60 6.47 6.49 9.78   HEMOGLOBIN g/dL 9.0* 9.1* 9.8* 9.4* 9.5* 9.6* 10.1*   PLATELETS 10*3/mm3 158 170 134* 172 170 158 150     Results from last 7 days   Lab Units 22  0502 22  0452 22  1625 22  0559 22  0510 22  0541 22  0450   SODIUM mmol/L 142 142 141 142 144 143 142   POTASSIUM mmol/L 3.6 3.9 4.0 3.9 3.6 3.6 3.6   CHLORIDE mmol/L 112* 111* 109* 110* 110* 109* 112*   CO2 mmol/L 23.9 " 22.9 21.0* 21.2* 23.0 21.4* 23.0   BUN mg/dL 22 24* 20 16 13 16 18   CREATININE mg/dL 1.25 1.53* 1.36* 1.07 1.02 1.09 1.11   GLUCOSE mg/dL 77 144* 159* 159* 117* 106* 108*   Estimated Creatinine Clearance: 44.7 mL/min (by C-G formula based on SCr of 1.25 mg/dL).  Results from last 7 days   Lab Units 04/01/22  0502 03/31/22  0452 03/30/22  1625 03/30/22  0559 03/29/22  0510 03/28/22  0541 03/27/22  0450 03/26/22  0530   CALCIUM mg/dL 7.4* 7.5* 7.4* 7.5* 7.7* 7.6* 7.6* 7.8*   ALBUMIN g/dL 1.90* 2.10*  --  2.10* 2.30* 2.10* 2.40* 2.30*     Results from last 7 days   Lab Units 04/01/22  0502 03/31/22  0452 03/30/22  0559 03/29/22  0510 03/28/22  0541 03/27/22  0450 03/26/22  0530   ALBUMIN g/dL 1.90* 2.10* 2.10* 2.30* 2.10* 2.40* 2.30*   BILIRUBIN mg/dL 0.2 0.3 0.4 0.5 0.5 0.6 0.6   ALK PHOS U/L 151* 140* 146* 144* 125* 128* 120*   AST (SGOT) U/L 30 35 31 15 14 14 11   ALT (SGPT) U/L 12 12 10 8 7 10 9       Assessment:  Acute cholecystitis: Status post laparoscopic cholecystectomy 3/29/2022.   Postop hemoglobin holding steady.  BYRON/dehydration:  Much improved.  Creatinine close to his baseline now.  Will discontinue IV fluids and continue to monitor.  Atrial fibrillation: Cardiology input appreciated. Presently with good rate control.  On Lovenox for anticoagulation.  Appears to be tolerating this well.  Mechanical aortic valve: See above.  Aspiration pneumonia: Infectious disease input appreciated.  Treatment completed.  Right pleural effusion: Status post thoracentesis with 1700 cc of serosanguineous fluid removed 3/30/2022.   Previous thoracenteses on 11/11/2021 and 10/25/2021.  History of C. difficile colitis: Infectious disease input appreciated.  Continue oral vancomycin.  :  Lab error   History nephrectomy/chronic kidney disease:  Diabetes type 2:  Very good response to single dose of Humulin in yesterday afternoon.  Blood sugars now in a very decent range.  His wife will bring in his insulin pump equipment.  We  will return him to his home resume at this point.  He has a glucose sensor in place and will be monitoring preprandial and 2-hour postprandial blood sugars.  This is discussed with the patient, his wife at bedside and RN at bedside.  Severe pulmonary hypertension: Last RV systolic pressure on echocardiogram estimated at 63 mmHg.    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  Suspect vasovagal episode resolved.      Plan:  Please see above.  Discussed with patient and wife at bedside.  Discussed with RN.    Robb Baldwin MD  4/1/2022  10:47 EDT

## 2022-04-01 NOTE — PLAN OF CARE
Goal Outcome Evaluation:  Plan of Care Reviewed With: patient        Progress: no change  Outcome Evaluation: VSS throughout shift with patient resting comfortably during night, but seeming more alert than he has been the last few days. Pt remained on IVF and tolerated turns and incontinence clean ups without any issues. MARIAN drain still producing moderate output. Pt remained A&O X4 throughout shift. Will continue to monitor and maintain safety measures.

## 2022-04-01 NOTE — PROGRESS NOTES
"    Patient Name: Francisco Sequeira  :1935  86 y.o.      Patient Care Team:  Rubin Hinkle APRN as PCP - General (Family Medicine)  Audra Vazquez RPH as Pharmacist  Vasquez Niño PharmD as Pharmacist (Pharmacy)  Tatiana Tinoco MD as Consulting Physician (Gastroenterology)    Chief Complaint: follow up mechanical aortic valve, cholecystitis     Interval History: drain still in place and draining (serosanguinous). He feels well. He is working with PT. HR and blood pressure good. He is on room air.      Objective   Vital Signs  Temp:  [97.3 °F (36.3 °C)-97.4 °F (36.3 °C)] 97.3 °F (36.3 °C)  Heart Rate:  [71-91] 81  Resp:  [18] 18  BP: (105-129)/(62-68) 129/65    Intake/Output Summary (Last 24 hours) at 2022 1006  Last data filed at 2022 0906  Gross per 24 hour   Intake 220 ml   Output 800 ml   Net -580 ml     Flowsheet Rows    Flowsheet Row First Filed Value   Admission Height 182.9 cm (72\") Documented at 2022 1316   Admission Weight 71 kg (156 lb 9.6 oz) Documented at 2022 1316          Physical Exam:   General Appearance:    Alert, cooperative, in no acute distress   Lungs:     Clear to auscultation.  Normal respiratory effort and rate.      Heart:    irregular rhythm and normal rate, normal S1 and S2, no murmurs, gallops or rubs.  Click     Chest Wall:    No abnormalities observed   Abdomen:     Soft, nontender, positive bowel sounds.     Extremities:   no cyanosis, clubbing or edema.  No marked joint deformities.  Adequate musculoskeletal strength.       Results Review:    Results from last 7 days   Lab Units 22  0502   SODIUM mmol/L 142   POTASSIUM mmol/L 3.6   CHLORIDE mmol/L 112*   CO2 mmol/L 23.9   BUN mg/dL 22   CREATININE mg/dL 1.25   GLUCOSE mg/dL 77   CALCIUM mg/dL 7.4*     Results from last 7 days   Lab Units 22  1625   TROPONIN T ng/mL 0.018     Results from last 7 days   Lab Units 22  0502   WBC 10*3/mm3 6.87   HEMOGLOBIN g/dL 9.0*   HEMATOCRIT % 28.5* "   PLATELETS 10*3/mm3 158     Results from last 7 days   Lab Units 04/01/22  0502 03/31/22  0452 03/30/22  0559   INR  1.28* 1.35* 1.31*                       Medication Review:   digoxin, 62.5 mcg, Oral, Daily  enoxaparin, 1 mg/kg, Subcutaneous, Q12H  famotidine, 20 mg, Oral, BID AC  ferrous sulfate, 325 mg, Oral, Every Other Day  guaiFENesin, 1,200 mg, Oral, Q12H  insulin lispro, 0-7 Units, Subcutaneous, TID AC  ipratropium-albuterol, 3 mL, Nebulization, 4x Daily - RT  lactobacillus acidophilus, 1 capsule, Oral, Daily  magnesium oxide, 400 mg, Oral, BID  metoprolol succinate XL, 12.5 mg, Oral, Daily  Morphine, 2 mg, Intravenous, Once  sodium bicarbonate, 1,300 mg, Oral, TID  sodium chloride, 10 mL, Intravenous, Q12H  vancomycin, 125 mg, Oral, Daily         hold, 1 each  lactated ringers, 9 mL/hr, Last Rate: 9 mL/hr (03/29/22 1815)  sodium chloride, 75 mL/hr, Last Rate: 75 mL/hr (03/28/22 1741)  sodium chloride, 100 mL/hr, Last Rate: 100 mL/hr (03/31/22 1828)        Assessment/Plan      1. Acute cholecystitis - to OR today.  2. Mechanical aortic valve  3. Permanent atrial fibrillation  4. Stroke (INR 2.2 at the time).   5. Chronic anticoagulation with warfarin. Followed by the [facility] clinic. Status post 2.5 vitmamin K 3/28  6. History of GI bleeding, AVM on colonoscopy.   7. Chronic diastolic heart failure, target INR 3-3.5  8. Right pleural effusion - thoracentesis 10/25/21 and 11/11/21. Repeat thoracentesis 3/30 with 1700 mL drained.    Resume warfarin when drain is out and ok with surgery.   Remains on therapeutic lovenox. Hgb ok.   HR and BP stable.   Cardiac status is stable. Will check back on Monday. Please call over weekend if needed.     LIZA Krishnamurthy  [facility]  04/01/22  10:06 EDT

## 2022-04-01 NOTE — PAYOR COMM NOTE
"Hua Francisco AMIN (86 y.o. Male)                       ATTENTION; CONTINUED STAY CLINICALS CASE REF #     [id number]                    REPLY TO [facility] DEPT  [phone] OR CALL  KYLE ROCK LPN               Date of Birth   1935    Social Security Number       Address   [address] [place] [address]    Home Phone       MRN   [id number]       Nondenominational   Oriental orthodox    Marital Status                               Admission Date   3/23/22    Admission Type   Emergency    Admitting Provider   Louis Jackson MD    Attending Provider   Robb Baldwin MD    Department, Room/Bed   [address], [address]1       Discharge Date       Discharge Disposition       Discharge Destination                               Attending Provider: Robb Baldwin MD    Allergies: Other, Penicillins, Percocet [Oxycodone-acetaminophen]    Isolation: Spore, Contact   Infection: C.difficile (02/15/22), MRSA (03/23/22)   Code Status: CPR   Advance Care Planning Activity    Ht: 182.9 cm (72\")   Wt: 71.9 kg (158 lb 8.2 oz)    Admission Cmt: None   Principal Problem: Calculus of gallbladder with acute cholecystitis without obstruction [K80.00]                 Active Insurance as of 3/23/2022     Primary Coverage     Payor Plan Insurance Group Employer/Plan Group    [facility] PPO [id number]     Payor Plan Address Payor Plan Phone Number Payor Plan Fax Number Effective Dates    [address] [phone]  1/1/2022 - None Entered    [facility] [id number]       Subscriber Name Subscriber Birth Date Member ID       PATRICIA XIE 7/11/1955 [id number]           Secondary Coverage     Payor Plan Insurance Group Employer/Plan Group    MEDICARE MEDICARE A & B      Payor Plan Address Payor Plan Phone Number Payor Plan Fax Number Effective Dates    [address] [phone]  11/1/2002 - None Entered    [facility] [id number]       Subscriber Name " Subscriber Birth Date Member ID       LAURA XIE 1935 [id number]                 Emergency Contacts      (Rel.) Home Phone Work Phone Mobile Phone    Gladys Xie (Spouse) [phone] -- [phone]    MendezmillyyumikoBruce felipe (Son) [phone] -- [phone]            Vital Signs (last day)     Date/Time Temp Temp src Pulse Resp BP Patient Position SpO2    04/01/22 0333 -- -- 83 -- 105/63 Lying 99    03/31/22 2347 97.4 (36.3) Oral 76 18 116/63 Lying 98    03/31/22 2053 -- -- 80 18 -- -- --    03/31/22 2048 -- -- 71 18 -- -- 98    03/31/22 1942 97.3 (36.3) Oral 73 18 106/62 Lying 100    03/31/22 1513 -- -- 86 -- -- -- 100    03/31/22 1507 -- -- 81 18 -- -- --    03/31/22 1233 -- -- 76 18 105/68 Lying 99    03/31/22 1106 -- -- 77 -- -- -- 100    03/31/22 1059 -- -- 91 18 -- -- --    03/31/22 0738 97 (36.1) Oral 90 16 111/59 Lying 97    03/31/22 0706 -- -- 72 -- -- -- 100    03/31/22 0658 -- -- 76 16 -- -- 98    03/31/22 0400 -- -- 84 16 112/60 Lying --    03/31/22 0200 -- -- 90 -- 108/51 -- --    03/31/22 0000 97.3 (36.3) Axillary 90 16 110/53 Lying 100          Oxygen Therapy (last day)     Date/Time SpO2 Device (Oxygen Therapy) Flow (L/min) Oxygen Concentration (%) ETCO2 (mmHg)    04/01/22 0333 99 nasal cannula 2 -- --    04/01/22 0000 -- nasal cannula 2 -- --    03/31/22 2347 98 nasal cannula 2 -- --    03/31/22 2048 98 nasal cannula 2 -- --    03/31/22 2000 -- nasal cannula 2 -- --    03/31/22 1942 100 nasal cannula 2 -- --    03/31/22 1513 100 -- -- -- --    03/31/22 1400 -- nasal cannula 2 -- --    03/31/22 1259 -- nasal cannula 2 -- --    03/31/22 1233 99 -- -- -- --    03/31/22 1106 100 -- -- -- --    03/31/22 0825 -- room air -- -- --    03/31/22 0738 97 -- -- -- --    03/31/22 0706 100 -- -- -- --    03/31/22 0658 98 room air -- -- --    03/31/22 0000 100 room air -- -- --          Lines, Drains & Airways     Active LDAs     Name Placement date Placement time Site Days     Peripheral IV 03/23/22 0831 Right Antecubital 03/23/22  0831  Antecubital  8    Peripheral IV 03/27/22 0830 Posterior;Right Forearm 03/27/22  0830  Forearm  4    Peripheral IV 03/29/22 1930 Left;Posterior Hand 03/29/22  1930  Hand  2    Closed/Suction Drain 1 RLQ Bulb 19 Fr. 03/29/22 2008  RLQ  2                Orders (last 24 hrs)      Start     Ordered    04/01/22 0614  POC Glucose Once  PROCEDURE ONCE         04/01/22 0611    03/31/22 2030  POC Glucose Once  PROCEDURE ONCE         03/31/22 2027    03/31/22 1830  sodium chloride 0.9 % infusion  Continuous         03/31/22 1736    03/31/22 1830  insulin NPH (humuLIN N,novoLIN N) injection 10 Units  Once         03/31/22 1737    03/31/22 1737  Please ask patient to have his insulin pump and supplies brought in tomorrow.  Physician Communication Order  Per Order Details        Comments: Please ask patient to have his insulin pump and supplies brought in tomorrow.    03/31/22 1736    03/31/22 1613  POC Glucose Once  PROCEDURE ONCE         03/31/22 1610    03/31/22 1116  POC Glucose Once  PROCEDURE ONCE         03/31/22 1111    03/31/22 1030  vancomycin (VANCOCIN) capsule 125 mg  Daily         03/31/22 0904    03/29/22 2156  HYDROcodone-acetaminophen (NORCO) 5-325 MG per tablet 1 tablet  Every 6 Hours PRN         03/29/22 2156    03/29/22 2000  Strict Intake & Output  Every Shift       03/29/22 0818    03/29/22 1626  lactated ringers infusion  Continuous         03/29/22 1624    03/29/22 1448  Hold medication  Continuous PRN         03/29/22 1449    03/26/22 0900  sodium chloride 0.45 % infusion  Continuous         03/26/22 0807    03/25/22 1230  ipratropium-albuterol (DUO-NEB) nebulizer solution 3 mL  4 Times Daily - RT         03/25/22 0952    03/25/22 0900  vancomycin (VANCOCIN) capsule 125 mg  Daily         03/24/22 1120    03/24/22 2100  enoxaparin (LOVENOX) syringe 70 mg  Every 12 Hours         03/24/22 1103    03/24/22 1200  digoxin (LANOXIN) tablet 62.5 mcg   Daily Digoxin         03/23/22 1547    03/24/22 0900  ferrous sulfate tablet 325 mg  Every Other Day         03/23/22 1547    03/24/22 0900  lactobacillus acidophilus (RISAQUAD) capsule 1 capsule  Daily         03/23/22 1547    03/24/22 0900  metoprolol succinate XL (TOPROL-XL) 24 hr tablet 12.5 mg  Daily         03/23/22 1547    03/24/22 0600  Comprehensive Metabolic Panel  Daily       03/23/22 1339    03/24/22 0600  Protime-INR  Daily       03/23/22 1339    03/24/22 0600  CBC (No Diff)  Daily       03/23/22 1339    03/23/22 2100  magnesium oxide (MAG-OX) tablet 400 mg  2 Times Daily         03/23/22 1547    03/23/22 1730  famotidine (PEPCID) tablet 20 mg  2 Times Daily Before Meals         03/23/22 1547    03/23/22 1730  insulin lispro (ADMELOG) injection 0-7 Units  3 Times Daily Before Meals         03/23/22 1340    03/23/22 1700  POC Glucose TID AC  3 Times Daily Before Meals       03/23/22 1340    03/23/22 1645  sodium bicarbonate tablet 1,300 mg  3 Times Daily         03/23/22 1547    03/23/22 1630  Oscillating Positive Expiratory Pressure (OPEP)  4 Times Daily - RT       03/23/22 1416    03/23/22 1600  Vital Signs  Every 4 Hours       03/23/22 1332    03/23/22 1600  Incentive Spirometry  Every 2 Hours While Awake       03/23/22 1416    03/23/22 1547  diphenhydrAMINE (BENADRYL) capsule 25 mg  2 Times Daily PRN         03/23/22 1547    03/23/22 1419  guaiFENesin (MUCINEX) 12 hr tablet 1,200 mg  Every 12 Hours Scheduled         03/23/22 1417    03/23/22 1340  dextrose (GLUTOSE) oral gel 15 g  Every 15 Minutes PRN         03/23/22 1340    03/23/22 1340  dextrose (D50W) (25 g/50 mL) IV injection 25 g  Every 15 Minutes PRN         03/23/22 1340    03/23/22 1340  glucagon (human recombinant) (GLUCAGEN DIAGNOSTIC) injection 1 mg  Every 15 Minutes PRN         03/23/22 1340    03/23/22 1334  sodium chloride 0.9 % flush 10 mL  Every 12 Hours Scheduled         03/23/22 1332    03/23/22 1333  Daily Weights  Daily        "03/23/22 1332    03/23/22 1329  ondansetron (ZOFRAN) injection 4 mg  Every 6 Hours PRN         03/23/22 1332    03/23/22 1329  acetaminophen (TYLENOL) tablet 650 mg  Every 4 Hours PRN        \"Or\" Linked Group Details    03/23/22 1332    03/23/22 1329  acetaminophen (TYLENOL) 160 MG/5ML solution 650 mg  Every 4 Hours PRN        \"Or\" Linked Group Details    03/23/22 1332    03/23/22 1329  acetaminophen (TYLENOL) suppository 650 mg  Every 4 Hours PRN        \"Or\" Linked Group Details    03/23/22 1332    03/23/22 1329  nitroglycerin (NITROSTAT) SL tablet 0.4 mg  Every 5 Minutes PRN         03/23/22 1332    03/23/22 1328  sodium chloride 0.9 % flush 10 mL  As Needed         03/23/22 1332    03/23/22 0746  morphine injection 2 mg  Once         03/23/22 0744    03/23/22 0710  sodium chloride 0.9 % flush 10 mL  As Needed        \"And\" Linked Group Details    03/23/22 0710    Unscheduled  Oxygen Therapy- Nasal Cannula; Titrate for SPO2: 90% - 95%  Continuous PRN      Comments: If Patient Develops Unresponsiveness, Acute Dyspnea, Cyanosis or Suspected Hypoxemia Start Continuous Pulse Ox Monitoring, Apply Oxygen & Notify Provider    03/23/22 1332    Unscheduled  ECG 12 Lead  As Needed      Comments: Nurse to Release if Patient Expericences Acute Chest Pain or Dysrhythmias    03/23/22 1332    Unscheduled  Potassium  As Needed      Comments: For Ventricular Arrhythmias      03/23/22 1332    Unscheduled  Magnesium  As Needed      Comments: For Ventricular Arrhythmias      03/23/22 1332    Unscheduled  Troponin  As Needed      Comments: For Chest Pain      03/23/22 1332    Unscheduled  Blood Gas, Arterial -  As Needed      Comments: Per O2 PolicyNotify Physician      03/23/22 1332    Unscheduled  Oxygen Therapy- Nasal Cannula; Titrate for SPO2: Per Policy  Continuous PRN       03/29/22 1624    --  warfarin (COUMADIN) 5 MG tablet  See Admin Instructions         03/23/22 1229                     Physician Progress Notes (last 24 " "hours)      Robb Baldwin MD at 22 1742          DAILY PROGRESS NOTE  [facility]    Patient Identification:  Name: Francisco Sequeira  Age: 86 y.o.  Sex: male  :  1935  MRN: [id number]         Primary Care Physician: Rubin Hinkle APRN      Subjective  Pt with no acute c/o today.  Tolerating PO intake well.     Objective:  General Appearance:  Comfortable, well-appearing, in no acute distress and not in pain.    Vital signs: (most recent): Blood pressure 105/68, pulse 86, temperature 97 °F (36.1 °C), temperature source Oral, resp. rate 18, height 182.9 cm (72\"), weight 71 kg (156 lb 8.4 oz), SpO2 100 %.    HEENT: (Mucous membranes are dry.)    Lungs:  Normal effort and normal respiratory rate.  Breath sounds clear to auscultation.    Heart: Normal rate.  Regular rhythm.  S1 normal.    Abdomen: Abdomen is soft.    Extremities: There is no dependent edema.    Neurological: Patient is alert and oriented to person, place and time.    Skin:  Warm and dry.              Vital signs in last 24 hours:  Temp:  [97 °F (36.1 °C)-98 °F (36.7 °C)] 97 °F (36.1 °C)  Heart Rate:  [] 86  Resp:  [16-20] 18  BP: ()/(46-68) 105/68    Intake/Output:    Intake/Output Summary (Last 24 hours) at 3/31/2022 1742  Last data filed at 3/30/2022 2320  Gross per 24 hour   Intake --   Output 120 ml   Net -120 ml         Results from last 7 days   Lab Units 22  0452 22  1625 22  0559 22  0510 22  0541 22  0450 22  0530   WBC 10*3/mm3 11.59* 3.13* 7.60 6.47 6.49 9.78 11.95*   HEMOGLOBIN g/dL 9.1* 9.8* 9.4* 9.5* 9.6* 10.1* 9.6*   PLATELETS 10*3/mm3 170 134* 172 170 158 150 142     Results from last 7 days   Lab Units 22  0452 22  1625 22  0559 22  0510 22  0541 22  0450 22  0530   SODIUM mmol/L 142 141 142 144 143 142 150*   POTASSIUM mmol/L 3.9 4.0 3.9 3.6 3.6 3.6 3.8   CHLORIDE mmol/L 111* 109* 110* 110* 109* 112* " 115*   CO2 mmol/L 22.9 21.0* 21.2* 23.0 21.4* 23.0 23.3   BUN mg/dL 24* 20 16 13 16 18 22   CREATININE mg/dL 1.53* 1.36* 1.07 1.02 1.09 1.11 1.16   GLUCOSE mg/dL 144* 159* 159* 117* 106* 108* 117*   Estimated Creatinine Clearance: 36.1 mL/min (A) (by C-G formula based on SCr of 1.53 mg/dL (H)).  Results from last 7 days   Lab Units 03/31/22  0452 03/30/22  1625 03/30/22  0559 03/29/22  0510 03/28/22  0541 03/27/22  0450 03/26/22  0530 03/25/22  0822   CALCIUM mg/dL 7.5* 7.4* 7.5* 7.7* 7.6* 7.6* 7.8* 8.2*   ALBUMIN g/dL 2.10*  --  2.10* 2.30* 2.10* 2.40* 2.30* 2.70*     Results from last 7 days   Lab Units 03/31/22  0452 03/30/22  0559 03/29/22  0510 03/28/22  0541 03/27/22  0450 03/26/22  0530 03/25/22  0822   ALBUMIN g/dL 2.10* 2.10* 2.30* 2.10* 2.40* 2.30* 2.70*   BILIRUBIN mg/dL 0.3 0.4 0.5 0.5 0.6 0.6 0.8   ALK PHOS U/L 140* 146* 144* 125* 128* 120* 125*   AST (SGOT) U/L 35 31 15 14 14 11 10   ALT (SGPT) U/L 12 10 8 7 10 9 12       Assessment:  Acute cholecystitis: Status post laparoscopic cholecystectomy 3/29/2022.  BYRON/dehydration: BUN/creatinine rising.  Mucous membranes dry.  Baseline creatinine of 1.1 is now up to 1.5.  Gently hydrate 1 L only overnight and monitor.  Atrial fibrillation: Cardiology input appreciated.   Mechanical aortic valve: Anticoagulation on hold secondary to surgery.  Aspiration pneumonia: Infectious disease input appreciated.  Right pleural effusion: Status post thoracentesis with 1700 cc of serosanguineous fluid removed earlier today-3/30/2022.  With a hypotensive episode requested stat chest x-ray.  Previous thoracenteses on 11/11/2021 and 10/25/2021.  History of C. difficile colitis: Infectious disease input appreciated.  Continue oral vancomycin.  :  Lab error   History nephrectomy/chronic kidney disease:  Diabetes type 2: Presently on sliding scale insulin.  Sugars creeping up.  We will give a single dose of NPH to tide him overnight.  Now that he is fully awake and oriented I  have requested that his home insulin pump be brought back in and this will provide better control.  Severe pulmonary hypertension: Last RV systolic pressure on echocardiogram estimated at 63 mmHg.    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  Suspect vasovagal episode.      Plan:  Please see above.    Robb Baldwin MD  3/31/2022  17:42 EDT      Electronically signed by Robb Baldwin MD at 03/31/22 1748     Ghislaine Chiang MD at 03/31/22 1113             LOS: 8 days   Patient Care Team:  Rubin Hinkle APRN as PCP - General (Family Medicine)  Audra Vazquez RPH as Pharmacist  Vasquez Niño PharmD as Pharmacist (Pharmacy)  Tatiana Tinoco MD as Consulting Physician (Gastroenterology)    Chief Complaint:     Follow-up atrial fibrillation and hypotension on 3/30/2022.    Interval History:     He is feeling better today.  He was standing up with physical therapy when I arrived and he said he was not feeling dizzy or weak.  He has had no nausea.  He has no chest pain.  He had a fairly good night.  He had no difficulty breathing and he said he was thirsty and had had breakfast.    Objective   Vital Signs  Temp:  [97 °F (36.1 °C)-98.2 °F (36.8 °C)] 97 °F (36.1 °C)  Heart Rate:  [] 77  Resp:  [16-20] 18  BP: ()/(46-77) 111/59    Intake/Output Summary (Last 24 hours) at 3/31/2022 1113  Last data filed at 3/30/2022 2320  Gross per 24 hour   Intake --   Output 120 ml   Net -120 ml       Comfortable NAD  Neck supple, no JVD or thyromegaly appreciated  S1/S2 irregular, no tachycardia, no m/r/g  Lungs CTA B, normal effort  Abdomen S/NT/ND (+) BS, no HSM appreciated  Extremities warm, no clubbing, cyanosis, or edema  No visible or palpable skin lesions  A/Ox4, mood and affect appropriate    Results Review:      Results from last 7 days   Lab Units 03/31/22  0452 03/30/22  1625 03/30/22  0559   SODIUM mmol/L 142 141 142   POTASSIUM mmol/L  3.9 4.0 3.9   CHLORIDE mmol/L 111* 109* 110*   CO2 mmol/L 22.9 21.0* 21.2*   BUN mg/dL 24* 20 16   CREATININE mg/dL 1.53* 1.36* 1.07   GLUCOSE mg/dL 144* 159* 159*   CALCIUM mg/dL 7.5* 7.4* 7.5*     Results from last 7 days   Lab Units 03/30/22  1625   TROPONIN T ng/mL 0.018     Results from last 7 days   Lab Units 03/31/22  0452 03/30/22  1625 03/30/22  0559   WBC 10*3/mm3 11.59* 3.13* 7.60   HEMOGLOBIN g/dL 9.1* 9.8* 9.4*   HEMATOCRIT % 29.4* 31.2* 29.3*   PLATELETS 10*3/mm3 170 134* 172     Results from last 7 days   Lab Units 03/31/22  0452 03/30/22  0559 03/29/22  0510   INR  1.35* 1.31* 1.44*                   I reviewed the patient's new clinical results.  I personally viewed and interpreted the patient's EKG/Telemetry data        Medication Review:   digoxin, 62.5 mcg, Oral, Daily  enoxaparin, 1 mg/kg, Subcutaneous, Q12H  famotidine, 20 mg, Oral, BID AC  ferrous sulfate, 325 mg, Oral, Every Other Day  guaiFENesin, 1,200 mg, Oral, Q12H  insulin lispro, 0-7 Units, Subcutaneous, TID AC  ipratropium-albuterol, 3 mL, Nebulization, 4x Daily - RT  lactobacillus acidophilus, 1 capsule, Oral, Daily  magnesium oxide, 400 mg, Oral, BID  metoprolol succinate XL, 12.5 mg, Oral, Daily  Morphine, 2 mg, Intravenous, Once  sodium bicarbonate, 1,300 mg, Oral, TID  sodium chloride, 10 mL, Intravenous, Q12H  vancomycin, 125 mg, Oral, Daily        hold, 1 each  lactated ringers, 9 mL/hr, Last Rate: 9 mL/hr (03/29/22 1815)  sodium chloride, 75 mL/hr, Last Rate: 75 mL/hr (03/28/22 1741)        Assessment/Plan       Calculus of gallbladder with acute cholecystitis without obstruction    Atrial fibrillation (HCC)    Hypertension    Type 2 diabetes mellitus (HCC)    H/O heart valve replacement with mechanical valve    Stage 3b chronic kidney disease (HCC)    BYRON (acute kidney injury) (HCC)    Chronic anticoagulation    Sepsis (HCC)    History of Clostridium difficile infection    Aspiration pneumonitis (HCC)       1. Acute  cholecystitis, s/p lap teja 3/29/22  2. Mechanical aortic valve  3. Permanent atrial fibrillation, rate control on metoprolol and digoxin.   4. Stroke (INR 2.2 at the time).   5. Chronic anticoagulation with warfarin. Followed by [facility] clinic. Status post 2.5 vitmamin K 3/28  6. History of GI bleeding, AVM on colonoscopy.   7. Chronic diastolic heart failure, target INR 3-3.5  8. Right pleural effusion - thoracentesis 10/25/21 and 21. Repeat thoracentesis done 3/30/22 with 1700 mL drained.  9.  BYRON, likely ATN due to hypotension.     Overall, I think he looks better.  His blood pressure did not drop when he stood but is chronically low.  I think though he needs a low-dose metoprolol and digoxin at this time to treat his atrial fibrillation.  I do not plan to make any medication changes.    Remain on Lovenox at this time until they are able to pull his drain.  Surgery please let us know when we can restart warfarin.  We will see tomorrow but probably see as needed over the weekend.    Ghislaine Cihang MD  22  11:13 EDT        Electronically signed by Ghislaine Chiang MD at 22 1141     Yuliet Dumont MD at 22 0928            PROGRESS NOTE  Patient Name: Francisco Sequeira  Age/Sex: 86 y.o. male  : 1935  MRN: [id number]    Date of Admission: 3/23/2022  Date of Encounter Visit: 22   LOS: 8 days   Patient Care Team:  Rubin Hinkle APRN as PCP - General (Family Medicine)  Audra Vazquez RPH as Pharmacist  Vasquez Niño, PharmD as Pharmacist (Pharmacy)  Tatiana Tinoco MD as Consulting Physician (Gastroenterology)    Chief Complaint: Aspiration pneumonia    Hospital course:   Thoracentesis done  on 3/30/2022 with 1700 cc of serosanguineous fluid drained and samples were sent to appropriate testing  Patient had laparoscopic cholecystectomy on 3/29/2022 with no perioperative complication  He had an episode of syncope on 3/30/2022 that was associated with hypotension and  "did respond to the IV fluid  His creatinine did trend up probably as a result of that  He is on room air today, awake and responsive, working with incentive spirometer, barely able to pull up to 500 cc        REVIEW OF SYSTEMS:   CONSTITUTIONAL: no fever or chills  CARDIOVASCULAR: No chest pain, chest pressure or chest discomfort. No palpitations or edema.   RESPIRATORY: No respiratory complaint on today's assessment.  GASTROINTESTINAL: No more vomiting.   HEMATOLOGIC: No bleeding or bruising.  More awake and responsive    Ventilator/Non-Invasive Ventilation Settings (From admission, onward)            No longer on oxygen, currently on room air with good oxygenation            Vital Signs  Temp:  [97 °F (36.1 °C)-98.2 °F (36.8 °C)] 97 °F (36.1 °C)  Heart Rate:  [] 90  Resp:  [16-20] 16  BP: ()/(46-77) 111/59  SpO2:  [94 %-100 %] 97 %  on  Flow (L/min):  [3] 3 Device (Oxygen Therapy): room air    Intake/Output Summary (Last 24 hours) at 3/31/2022 0928  Last data filed at 3/30/2022 2320  Gross per 24 hour   Intake --   Output 1820 ml   Net -1820 ml     Flowsheet Rows    Flowsheet Row First Filed Value   Admission Height 182.9 cm (72\") Documented at 03/23/2022 1316   Admission Weight 71 kg (156 lb 9.6 oz) Documented at 03/23/2022 1316        Body mass index is 21.23 kg/m².      03/29/22  0535 03/30/22  0638 03/31/22  0654   Weight: 70.3 kg (155 lb) 75.2 kg (165 lb 12.6 oz) 71 kg (156 lb 8.4 oz)       Physical Exam:  GEN:  No acute distress, alert, cooperative, well developed   EYES:   Sclerae clear. No icterus. PERRL. Normal EOM  ENT:   External ears/nose normal, no oral lesions, no thrush, mucous membranes moist  NECK:  Supple, midline trachea, no JVD  LUNGS: Normal chest on inspection,   still with reduced lung sounds, possible crackles posteriorly  CV: Regular rhythm with controlled heart rate. Normal S1/S2. No murmurs, gallops, or rubs noted.  ABD:  Soft, the abdomen is more soft, less tender, with " positive bowel sounds, laparoscopic incisions are clean with no drainage or erythema  EXT:  Moves all extremities well. No cyanosis. No redness. No edema.   Skin: Dry, intact, no bleeding    Results Review:    Results From Last 14 Days   Lab Units 03/23/22  1321 03/23/22  0841   LACTATE mmol/L 1.7 4.9*     Results from last 7 days   Lab Units 03/31/22  0452 03/30/22  1625 03/30/22  0559 03/29/22  0510 03/28/22  0541 03/27/22  0450 03/26/22  0530 03/25/22  0822   SODIUM mmol/L 142 141 142 144 143 142 150* 145   POTASSIUM mmol/L 3.9 4.0 3.9 3.6 3.6 3.6 3.8 4.1   CHLORIDE mmol/L 111* 109* 110* 110* 109* 112* 115* 113*   CO2 mmol/L 22.9 21.0* 21.2* 23.0 21.4* 23.0 23.3 22.2   BUN mg/dL 24* 20 16 13 16 18 22 25*   CREATININE mg/dL 1.53* 1.36* 1.07 1.02 1.09 1.11 1.16 1.23   CALCIUM mg/dL 7.5* 7.4* 7.5* 7.7* 7.6* 7.6* 7.8* 8.2*   AST (SGOT) U/L 35  --  31 15 14 14 11 10   ALT (SGPT) U/L 12  --  10 8 7 10 9 12   ANION GAP mmol/L 8.1 11.0 10.8 11.0 12.6 7.0 11.7 9.8   ALBUMIN g/dL 2.10*  --  2.10* 2.30* 2.10* 2.40* 2.30* 2.70*     Results from last 7 days   Lab Units 03/30/22  1625   TROPONIN T ng/mL 0.018             Results from last 7 days   Lab Units 03/31/22  0452 03/30/22  1625 03/30/22  0559 03/29/22  0510 03/28/22  0541 03/27/22  0450 03/26/22  0530   WBC 10*3/mm3 11.59* 3.13* 7.60 6.47 6.49 9.78 11.95*   HEMOGLOBIN g/dL 9.1* 9.8* 9.4* 9.5* 9.6* 10.1* 9.6*   HEMATOCRIT % 29.4* 31.2* 29.3* 28.8* 30.0* 31.9* 30.3*   PLATELETS 10*3/mm3 170 134* 172 170 158 150 142   MCV fL 82.4 81.0 82.1 77.8* 82.2 80.8 80.8     Results from last 7 days   Lab Units 03/31/22  0452 03/30/22  0559 03/29/22  0510   INR  1.35* 1.31* 1.44*               Invalid input(s): LDLCALC  Results from last 7 days   Lab Units 03/30/22  1800   PH, ARTERIAL pH units 7.453*   PCO2, ARTERIAL mm Hg 30.6*   PO2 ART mm Hg 91.1   HCO3 ART mmol/L 21.5*         Glucose   Date/Time Value Ref Range Status   03/31/2022 0618 136 (H) 70 - 130 mg/dL Final      Comment:     Meter: [id number] : [id number] Bree Llanos NA   03/30/2022 2038 151 (H) 70 - 130 mg/dL Final     Comment:     Meter: [id number] : [id number] Bree Llanos NA   03/30/2022 1615 169 (H) 70 - 130 mg/dL Final     Comment:     Meter: [id number] : [id number] Daryl Miguel NA   03/30/2022 1055 133 (H) 70 - 130 mg/dL Final     Comment:     Meter: [id number] : [id number] Daryl Becerrayla NA   03/30/2022 0556 185 (H) 70 - 130 mg/dL Final     Comment:     Meter: [id number] : [id number] Pat Blancas NA   03/29/2022 2044 131 (H) 70 - 130 mg/dL Final     Comment:     Meter: [id number] : [id number] Markos Beard CNA   03/29/2022 1606 124 70 - 130 mg/dL Final     Comment:     Meter: [id number] : [id number] Messi Victoria RN   03/29/2022 1102 124 70 - 130 mg/dL Final     Comment:     Meter: [id number] : [id number] Abhinav Walshee MELO     Results from last 7 days   Lab Units 03/25/22  0822   PROCALCITONIN ng/mL 1.78*     Results from last 7 days   Lab Units 03/24/22  0943   RESPCX  Scant growth (1+) Normal respiratory walt. No S. aureus or Pseudomonas aeruginosa detected. Final report.         Results from last 7 days   Lab Units 03/28/22  1902   COVID19  Not Detected               Imaging:   Imaging Results (All)     Procedure Component Value Units Date/Time       I reviewed the patient's new clinical results.  I personally viewed and interpreted the patient's imaging results: Follow-up chest x-ray after the thoracentesis showed good reexpansion of the right lung with nearly no significant residual fluid at all.  The interstitial edema is also better        Medication Review:   digoxin, 62.5 mcg, Oral, Daily  enoxaparin, 1 mg/kg, Subcutaneous, Q12H  famotidine, 20 mg, Oral, BID AC  ferrous sulfate, 325 mg, Oral, Every Other Day  guaiFENesin, 1,200 mg, Oral, Q12H  insulin lispro, 0-7 Units, Subcutaneous, TID AC  ipratropium-albuterol, 3 mL, Nebulization, 4x Daily - RT  lactobacillus acidophilus, 1  capsule, Oral, Daily  magnesium oxide, 400 mg, Oral, BID  metoprolol succinate XL, 12.5 mg, Oral, Daily  Morphine, 2 mg, Intravenous, Once  sodium bicarbonate, 1,300 mg, Oral, TID  sodium chloride, 10 mL, Intravenous, Q12H  vancomycin, 125 mg, Oral, Daily        hold, 1 each  lactated ringers, 9 mL/hr, Last Rate: 9 mL/hr (03/29/22 1815)  sodium chloride, 75 mL/hr, Last Rate: 75 mL/hr (03/28/22 1741)        ASSESSMENT:   1. Aspiration pneumonitis  2. Right-sided pleural effusion  3. Nausea and vomiting, with acute cholecystitis confirmed by HIDA scan  4. Atrial fibrillation  5. Chronic anticoagulation  6. 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 History of recent C. difficile colitis    PLAN:  Patient is doing better  today, need to follow-up on the renal function and need to avoid any more hypotension, agree with the IV hydration  Continue to work with incentive spirometer, need to do a better job with expectoration as well he already has incentive spirometer and flutter valve at the bedside and he is working with both of them  Patient is done with his course of antibiotic for his aspiration pneumonitis, he is on oral vancomycin for the C. difficile colitis  Doing better from the surgical standpoint as well.  Feeding per surgical team  We will continue to follow his respiratory status      Disposition: Per primary team    Yuliet Dumont MD  03/31/22  09:28 EDT            Dictated utilizing Dragon dictation    Electronically signed by Yuliet Dumont MD at 03/31/22 7591     Lisa Guidry MD at 03/31/22 3554          General Surgery Progress Note    Summary:  Mr. Francisco Sequeira is a 86 y.o. year old gentleman POD2 s/p laparoscopic cholecystectomy with cholangiogram.  Overall doing better today.  Labs normal.  Diet as tolerated.  Continue MARIAN drain.  Out of bed and ambulate.  On therapeutic Lovenox.    Chief Complaint: abdominal pain    Interval Events: Events of yesterday evening  "noted.  Doing much better today.  Sleepy but looks less uncomfortable.  No nausea or vomiting.  Tolerating clears.    Drain output 220cc.     Vitals: /60 (BP Location: Left arm, Patient Position: Lying)   Pulse 72   Temp 97.3 °F (36.3 °C) (Axillary)   Resp 16   Ht 182.9 cm (72\")   Wt 71 kg (156 lb 8.4 oz)   SpO2 100%   BMI 21.23 kg/m²     GENERAL: alert, frail appearing, and in no distress  HEENT: normocephalic, atraumatic, moist mucous membranes, clear sclerae   CHEST: on 3L NC, no increased work of breathing, symmetric air entry  CARDIAC: regular rate and rhythm    ABDOMEN: Soft, nondistended, incisions clean and dry, MARIAN serosanguinous   EXTREMITIES: no cyanosis, clubbing, or edema   SKIN: Warm and moist, no rashes    Labs:  Results from last 7 days   Lab Units 03/31/22 0452 03/30/22  1625 03/30/22  0559   WBC 10*3/mm3 11.59* 3.13* 7.60   HEMOGLOBIN g/dL 9.1* 9.8* 9.4*   HEMATOCRIT % 29.4* 31.2* 29.3*   PLATELETS 10*3/mm3 170 134* 172   MONOCYTES % %  --  2.0*  --      Results from last 7 days   Lab Units 03/31/22 0452 03/30/22  1625 03/30/22  0559 03/29/22  0510   SODIUM mmol/L 142 141 142 144   POTASSIUM mmol/L 3.9 4.0 3.9 3.6   CHLORIDE mmol/L 111* 109* 110* 110*   CO2 mmol/L 22.9 21.0* 21.2* 23.0   BUN mg/dL 24* 20 16 13   CREATININE mg/dL 1.53* 1.36* 1.07 1.02   CALCIUM mg/dL 7.5* 7.4* 7.5* 7.7*   BILIRUBIN mg/dL 0.3  --  0.4 0.5   ALK PHOS U/L 140*  --  146* 144*   ALT (SGPT) U/L 12  --  10 8   AST (SGOT) U/L 35  --  31 15   GLUCOSE mg/dL 144* 159* 159* 117*     Results from last 7 days   Lab Units 03/31/22 0452 03/30/22  0559 03/29/22  0510   INR  1.35* 1.31* 1.44*       LISA MERCADO MD  General and Endoscopic Surgery  [facility]    [address], [address]  [place], [place], [address]  P: [phone]  F: [phone]       Electronically signed by Lisa Mercado MD at 03/31/22 1206       "

## 2022-04-01 NOTE — PROGRESS NOTES
"  PROGRESS NOTE  Patient Name: Francisco Sequeira  Age/Sex: 86 y.o. male  : 1935  MRN: [id number]    Date of Admission: 3/23/2022  Date of Encounter Visit: 22   LOS: 9 days   Patient Care Team:  Rubin Hinkle APRN as PCP - General (Family Medicine)  Audra Vazquez RPH as Pharmacist  Vasquez Niño PharmD as Pharmacist (Pharmacy)  Tatiana Tinoco MD as Consulting Physician (Gastroenterology)    Chief Complaint: Aspiration pneumonia    Hospital course:   Thoracentesis done  on 3/30/2022 with 1700 cc of serosanguineous fluid drained and samples were sent to appropriate testing  Patient had laparoscopic cholecystectomy on 3/29/2022 with no perioperative complication  He had an episode of syncope on 3/30/2022 that was associated with hypotension and did respond to the IV fluid  His creatinine did trend up probably as a result of thatAnd now it is trending down  He is currently on room air and has been on it since yesterday with no respiratory complaints        REVIEW OF SYSTEMS:   CONSTITUTIONAL: no fever or chills  No shortness of breath    Ventilator/Non-Invasive Ventilation Settings (From admission, onward)            No longer on oxygen, currently on room air with good oxygenation            Vital Signs  Temp:  [97.3 °F (36.3 °C)-97.5 °F (36.4 °C)] 97.5 °F (36.4 °C)  Heart Rate:  [70-83] 70  Resp:  [18] 18  BP: (105-129)/(62-65) 125/63  SpO2:  [97 %-100 %] 98 %  on  Flow (L/min):  [2] 2 Device (Oxygen Therapy): room air    Intake/Output Summary (Last 24 hours) at 2022 1756  Last data filed at 2022 1708  Gross per 24 hour   Intake 620 ml   Output 940 ml   Net -320 ml     Flowsheet Rows    Flowsheet Row First Filed Value   Admission Height 182.9 cm (72\") Documented at 2022 1316   Admission Weight 71 kg (156 lb 9.6 oz) Documented at 2022 1316        Body mass index is 21.5 kg/m².      22  0638 22  0654 22  0520   Weight: 75.2 kg (165 lb 12.6 oz) 71 kg (156 lb 8.4 oz) " 71.9 kg (158 lb 8.2 oz)       Physical Exam:  GEN:  No acute distress, alert, cooperative, well developed   LUNGS: Normal chest on inspection,   still with reduced lung sounds, possible crackles posteriorly      Results Review:    Results From Last 14 Days   Lab Units 03/23/22  1321 03/23/22  0841   LACTATE mmol/L 1.7 4.9*     Results from last 7 days   Lab Units 04/01/22  0502 03/31/22  0452 03/30/22  1625 03/30/22  0559 03/29/22  0510 03/28/22  0541 03/27/22  0450 03/26/22  0530   SODIUM mmol/L 142 142 141 142 144 143 142 150*   POTASSIUM mmol/L 3.6 3.9 4.0 3.9 3.6 3.6 3.6 3.8   CHLORIDE mmol/L 112* 111* 109* 110* 110* 109* 112* 115*   CO2 mmol/L 23.9 22.9 21.0* 21.2* 23.0 21.4* 23.0 23.3   BUN mg/dL 22 24* 20 16 13 16 18 22   CREATININE mg/dL 1.25 1.53* 1.36* 1.07 1.02 1.09 1.11 1.16   CALCIUM mg/dL 7.4* 7.5* 7.4* 7.5* 7.7* 7.6* 7.6* 7.8*   AST (SGOT) U/L 30 35  --  31 15 14 14 11   ALT (SGPT) U/L 12 12  --  10 8 7 10 9   ANION GAP mmol/L 6.1 8.1 11.0 10.8 11.0 12.6 7.0 11.7   ALBUMIN g/dL 1.90* 2.10*  --  2.10* 2.30* 2.10* 2.40* 2.30*     Results from last 7 days   Lab Units 03/30/22  1625   TROPONIN T ng/mL 0.018             Results from last 7 days   Lab Units 04/01/22  0502 03/31/22  0452 03/30/22  1625 03/30/22  0559 03/29/22  0510 03/28/22  0541 03/27/22  0450   WBC 10*3/mm3 6.87 11.59* 3.13* 7.60 6.47 6.49 9.78   HEMOGLOBIN g/dL 9.0* 9.1* 9.8* 9.4* 9.5* 9.6* 10.1*   HEMATOCRIT % 28.5* 29.4* 31.2* 29.3* 28.8* 30.0* 31.9*   PLATELETS 10*3/mm3 158 170 134* 172 170 158 150   MCV fL 81.9 82.4 81.0 82.1 77.8* 82.2 80.8     Results from last 7 days   Lab Units 04/01/22  0502 03/31/22  0452 03/30/22  0559   INR  1.28* 1.35* 1.31*               Invalid input(s): LDLCALC  Results from last 7 days   Lab Units 03/30/22  1800   PH, ARTERIAL pH units 7.453*   PCO2, ARTERIAL mm Hg 30.6*   PO2 ART mm Hg 91.1   HCO3 ART mmol/L 21.5*         Glucose   Date/Time Value Ref Range Status   04/01/2022 1609 192 (H) 70 - 130 mg/dL  Final     Comment:     Meter: [id number] : [id number] Octavio Mistry NA   04/01/2022 1227 173 (H) 70 - 130 mg/dL Final     Comment:     Meter: [id number] : [id number] Ramolinden Jacquelyn Paige RN   04/01/2022 1104 165 (H) 70 - 130 mg/dL Final     Comment:     Meter: [id number] : [id number] Octavio Mistry NA   04/01/2022 0611 93 70 - 130 mg/dL Final     Comment:     Meter: [id number] : [id number] Kiki Horne NA   03/31/2022 2027 210 (H) 70 - 130 mg/dL Final     Comment:     Meter: [id number] : [id number] Kiki Ropere NA   03/31/2022 1610 212 (H) 70 - 130 mg/dL Final     Comment:     Meter: [id number] : [id number] Jose Simpson    03/31/2022 1111 136 (H) 70 - 130 mg/dL Final     Comment:     Meter: [id number] : [id number] [facility]   03/31/2022 0618 136 (H) 70 - 130 mg/dL Final     Comment:     Meter: [id number] : [id number] Bree ALEJO                 Results from last 7 days   Lab Units 03/28/22  1902   COVID19  Not Detected               Imaging:   Imaging Results (All)     Procedure Component Value Units Date/Time       I reviewed the patient's new clinical results.  I personally viewed and interpreted the patient's imaging results: Follow-up chest x-ray after the thoracentesis showed good reexpansion of the right lung with nearly no significant residual fluid at all.  The interstitial edema is also better        Medication Review:   digoxin, 62.5 mcg, Oral, Daily  enoxaparin, 1 mg/kg, Subcutaneous, Q12H  ferrous sulfate, 325 mg, Oral, Every Other Day  guaiFENesin, 1,200 mg, Oral, Q12H  ipratropium-albuterol, 3 mL, Nebulization, 4x Daily - RT  lactobacillus acidophilus, 1 capsule, Oral, Daily  magnesium oxide, 400 mg, Oral, BID  metoprolol succinate XL, 12.5 mg, Oral, Daily  Morphine, 2 mg, Intravenous, Once  sodium chloride, 10 mL, Intravenous, Q12H  vancomycin, 125 mg, Oral, Daily        hold, 1 each  lactated ringers, 9 mL/hr, Last Rate: 9 mL/hr (03/29/22  1815)        ASSESSMENT:   1. Aspiration pneumonitis  2. Right-sided pleural effusion  3. Nausea and vomiting, with acute cholecystitis confirmed by HIDA scan  4. Atrial fibrillation  5. Chronic anticoagulation  6. Severe pulmonary hypertension  7. 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 History of recent C. difficile colitis  9. Type 2 diabetes mellitus    PLAN:  Patient is doing better    On room air  Continue pulmonary hygiene  We will follow as needed        Disposition: Per primary team    Yuliet Dumont MD  04/01/22  17:56 EDT            Dictated utilizing Dragon dictation

## 2022-04-01 NOTE — CASE MANAGEMENT/SOCIAL WORK
Continued Stay Note  [facility]     Patient Name: Francisco Sequeira  MRN: [id number]  Today's Date: 4/1/2022    Admit Date: 3/23/2022     Discharge Plan     Row Name 04/01/22 1615       Plan    Plan Home with wife Gladys and [facility]    Patient/Family in Agreement with Plan yes    Plan Comments CCP spoke to pts. Wife Gladys via telephone, [phone] to follow up and ensure plan is still home with [facility] at discharge. CCP explained that if pt. goes home with drain home health can monitor when the nurse visits but it would not be every day. Gladys stated she did not think the plan was for pt. to go home with drain. CCP explained IMM and appeal rights in case pt. is discharged over the weekend. Gladys verbalized her understanding but stated they are not interested in appealing. CCP to monitor for any discharge needs that may arise.               Discharge Codes    No documentation.               Expected Discharge Date and Time     Expected Discharge Date Expected Discharge Time    Apr 3, 2022

## 2022-04-02 LAB
ALBUMIN SERPL-MCNC: 2.4 G/DL (ref 3.5–5.2)
ALBUMIN/GLOB SERPL: 0.9 G/DL
ALP SERPL-CCNC: 158 U/L (ref 39–117)
ALT SERPL W P-5'-P-CCNC: 11 U/L (ref 1–41)
ANION GAP SERPL CALCULATED.3IONS-SCNC: 5 MMOL/L (ref 5–15)
AST SERPL-CCNC: 21 U/L (ref 1–40)
BILIRUB SERPL-MCNC: 0.3 MG/DL (ref 0–1.2)
BUN SERPL-MCNC: 17 MG/DL (ref 8–23)
BUN/CREAT SERPL: 17 (ref 7–25)
CALCIUM SPEC-SCNC: 7.5 MG/DL (ref 8.6–10.5)
CHLORIDE SERPL-SCNC: 112 MMOL/L (ref 98–107)
CO2 SERPL-SCNC: 26 MMOL/L (ref 22–29)
CREAT SERPL-MCNC: 1 MG/DL (ref 0.76–1.27)
DEPRECATED RDW RBC AUTO: 52.1 FL (ref 37–54)
EGFRCR SERPLBLD CKD-EPI 2021: 74.2 ML/MIN/1.73
ERYTHROCYTE [DISTWIDTH] IN BLOOD BY AUTOMATED COUNT: 17.2 % (ref 12.3–15.4)
GLOBULIN UR ELPH-MCNC: 2.8 GM/DL
GLUCOSE BLDC GLUCOMTR-MCNC: 100 MG/DL (ref 70–130)
GLUCOSE BLDC GLUCOMTR-MCNC: 108 MG/DL (ref 70–130)
GLUCOSE BLDC GLUCOMTR-MCNC: 141 MG/DL (ref 70–130)
GLUCOSE BLDC GLUCOMTR-MCNC: 198 MG/DL (ref 70–130)
GLUCOSE SERPL-MCNC: 75 MG/DL (ref 65–99)
HCT VFR BLD AUTO: 29.2 % (ref 37.5–51)
HGB BLD-MCNC: 9 G/DL (ref 13–17.7)
INR PPP: 1.14 (ref 0.9–1.1)
MCH RBC QN AUTO: 25.6 PG (ref 26.6–33)
MCHC RBC AUTO-ENTMCNC: 30.8 G/DL (ref 31.5–35.7)
MCV RBC AUTO: 83 FL (ref 79–97)
PLATELET # BLD AUTO: 193 10*3/MM3 (ref 140–450)
PMV BLD AUTO: 11.9 FL (ref 6–12)
POTASSIUM SERPL-SCNC: 3.8 MMOL/L (ref 3.5–5.2)
PROT SERPL-MCNC: 5.2 G/DL (ref 6–8.5)
PROTHROMBIN TIME: 14.5 SECONDS (ref 11.7–14.2)
RBC # BLD AUTO: 3.52 10*6/MM3 (ref 4.14–5.8)
SODIUM SERPL-SCNC: 143 MMOL/L (ref 136–145)
WBC NRBC COR # BLD: 7.92 10*3/MM3 (ref 3.4–10.8)

## 2022-04-02 PROCEDURE — 85027 COMPLETE CBC AUTOMATED: CPT | Performed by: STUDENT IN AN ORGANIZED HEALTH CARE EDUCATION/TRAINING PROGRAM

## 2022-04-02 PROCEDURE — 25010000002 ENOXAPARIN PER 10 MG: Performed by: STUDENT IN AN ORGANIZED HEALTH CARE EDUCATION/TRAINING PROGRAM

## 2022-04-02 PROCEDURE — 82962 GLUCOSE BLOOD TEST: CPT

## 2022-04-02 PROCEDURE — 80053 COMPREHEN METABOLIC PANEL: CPT | Performed by: STUDENT IN AN ORGANIZED HEALTH CARE EDUCATION/TRAINING PROGRAM

## 2022-04-02 PROCEDURE — 94799 UNLISTED PULMONARY SVC/PX: CPT

## 2022-04-02 PROCEDURE — 85610 PROTHROMBIN TIME: CPT | Performed by: STUDENT IN AN ORGANIZED HEALTH CARE EDUCATION/TRAINING PROGRAM

## 2022-04-02 PROCEDURE — 99024 POSTOP FOLLOW-UP VISIT: CPT | Performed by: SURGERY

## 2022-04-02 RX ADMIN — ENOXAPARIN SODIUM 70 MG: 100 INJECTION SUBCUTANEOUS at 20:22

## 2022-04-02 RX ADMIN — Medication 10 ML: at 20:22

## 2022-04-02 RX ADMIN — METOPROLOL SUCCINATE 12.5 MG: 25 TABLET, EXTENDED RELEASE ORAL at 09:48

## 2022-04-02 RX ADMIN — GUAIFENESIN 1200 MG: 600 TABLET, EXTENDED RELEASE ORAL at 20:22

## 2022-04-02 RX ADMIN — IPRATROPIUM BROMIDE AND ALBUTEROL SULFATE 3 ML: 2.5; .5 SOLUTION RESPIRATORY (INHALATION) at 17:15

## 2022-04-02 RX ADMIN — Medication 1 CAPSULE: at 09:48

## 2022-04-02 RX ADMIN — GUAIFENESIN 1200 MG: 600 TABLET, EXTENDED RELEASE ORAL at 09:47

## 2022-04-02 RX ADMIN — IPRATROPIUM BROMIDE AND ALBUTEROL SULFATE 3 ML: 2.5; .5 SOLUTION RESPIRATORY (INHALATION) at 19:50

## 2022-04-02 RX ADMIN — MAGNESIUM OXIDE 400 MG (241.3 MG MAGNESIUM) TABLET 400 MG: TABLET at 20:22

## 2022-04-02 RX ADMIN — IPRATROPIUM BROMIDE AND ALBUTEROL SULFATE 3 ML: 2.5; .5 SOLUTION RESPIRATORY (INHALATION) at 10:25

## 2022-04-02 RX ADMIN — IPRATROPIUM BROMIDE AND ALBUTEROL SULFATE 3 ML: 2.5; .5 SOLUTION RESPIRATORY (INHALATION) at 13:26

## 2022-04-02 RX ADMIN — Medication 10 ML: at 09:47

## 2022-04-02 RX ADMIN — DIGOXIN 62.5 MCG: 125 TABLET ORAL at 12:35

## 2022-04-02 RX ADMIN — ENOXAPARIN SODIUM 70 MG: 100 INJECTION SUBCUTANEOUS at 09:48

## 2022-04-02 RX ADMIN — VANCOMYCIN HYDROCHLORIDE 125 MG: 125 CAPSULE ORAL at 09:48

## 2022-04-02 RX ADMIN — MAGNESIUM OXIDE 400 MG (241.3 MG MAGNESIUM) TABLET 400 MG: TABLET at 09:48

## 2022-04-02 NOTE — PROGRESS NOTES
"DAILY PROGRESS NOTE  [facility]    Patient Identification:  Name: Francisco Sequeira  Age: 86 y.o.  Sex: male  :  1935  MRN: [id number]         Primary Care Physician: Rubin Hinkle APRN      Subjective  Patient with no acute complaints.  Overall feeling well.  Tolerating oral intake well.  Good BMs.    Objective:  General Appearance:  Comfortable, well-appearing, in no acute distress and not in pain.    Vital signs: (most recent): Blood pressure 150/68, pulse 88, temperature 97.7 °F (36.5 °C), temperature source Oral, resp. rate 20, height 182.9 cm (72\"), weight 72.1 kg (158 lb 15.2 oz), SpO2 98 %.    Lungs:  Normal effort and normal respiratory rate.  Breath sounds clear to auscultation.    Heart: Normal rate.  Irregular rhythm.    Abdomen: Abdomen is soft.  Bowel sounds are normal.   There is no abdominal tenderness.     Extremities: There is no dependent edema.    Neurological: Patient is alert and oriented to person, place and time.    Skin:  Warm and dry.                Vital signs in last 24 hours:  Temp:  [97.3 °F (36.3 °C)-97.7 °F (36.5 °C)] 97.7 °F (36.5 °C)  Heart Rate:  [70-90] 88  Resp:  [17-20] 20  BP: (126-150)/(67-81) 150/68    Intake/Output:    Intake/Output Summary (Last 24 hours) at 2022 1436  Last data filed at 2022 0126  Gross per 24 hour   Intake 200 ml   Output 470 ml   Net -270 ml         Results from last 7 days   Lab Units 22  0456 22  0502 22  0452 22  1625 22  0559 22  0510 22  0541   WBC 10*3/mm3 7.92 6.87 11.59* 3.13* 7.60 6.47 6.49   HEMOGLOBIN g/dL 9.0* 9.0* 9.1* 9.8* 9.4* 9.5* 9.6*   PLATELETS 10*3/mm3 193 158 170 134* 172 170 158     Results from last 7 days   Lab Units 22  0456 22  0502 22  0452 22  1625 22  0559 22  0510 22  0541   SODIUM mmol/L 143 142 142 141 142 144 143   POTASSIUM mmol/L 3.8 3.6 3.9 4.0 3.9 3.6 3.6   CHLORIDE mmol/L 112* 112* 111* 109* 110* 110* " 109*   CO2 mmol/L 26.0 23.9 22.9 21.0* 21.2* 23.0 21.4*   BUN mg/dL 17 22 24* 20 16 13 16   CREATININE mg/dL 1.00 1.25 1.53* 1.36* 1.07 1.02 1.09   GLUCOSE mg/dL 75 77 144* 159* 159* 117* 106*   Estimated Creatinine Clearance: 56.1 mL/min (by C-G formula based on SCr of 1 mg/dL).  Results from last 7 days   Lab Units 04/02/22  0456 04/01/22  0502 03/31/22  0452 03/30/22  1625 03/30/22  0559 03/29/22  0510 03/28/22  0541 03/27/22  0450   CALCIUM mg/dL 7.5* 7.4* 7.5* 7.4* 7.5* 7.7* 7.6* 7.6*   ALBUMIN g/dL 2.40* 1.90* 2.10*  --  2.10* 2.30* 2.10* 2.40*     Results from last 7 days   Lab Units 04/02/22  0456 04/01/22  0502 03/31/22  0452 03/30/22  0559 03/29/22  0510 03/28/22  0541 03/27/22  0450   ALBUMIN g/dL 2.40* 1.90* 2.10* 2.10* 2.30* 2.10* 2.40*   BILIRUBIN mg/dL 0.3 0.2 0.3 0.4 0.5 0.5 0.6   ALK PHOS U/L 158* 151* 140* 146* 144* 125* 128*   AST (SGOT) U/L 21 30 35 31 15 14 14   ALT (SGPT) U/L 11 12 12 10 8 7 10       Assessment:  Acute cholecystitis: Status post laparoscopic cholecystectomy 3/29/2022.   Postop hemoglobin holding steady.  BYRON/dehydration: Resolved.  Atrial fibrillation: Cardiology input appreciated. Presently with good rate control.  On Lovenox for anticoagulation.  Appears to be tolerating this well.  Mechanical aortic valve: See above.  Aspiration pneumonia: Infectious disease input appreciated.  Treatment completed.  Right pleural effusion: Status post thoracentesis with 1700 cc of serosanguineous fluid removed 3/30/2022.   Previous thoracenteses on 11/11/2021 and 10/25/2021.  History of C. difficile colitis: Infectious disease input appreciated.  Continue oral vancomycin.  :  Lab error   History nephrectomy/chronic kidney disease:  Diabetes type 2:   Presently with good control with his home insulin pump back in place.    Severe pulmonary hypertension: Last RV systolic pressure on echocardiogram estimated at 63 mmHg.    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  Probable vasovagal episode resolved.  Anemia: Acute on chronic.  Postop anemia expected.  He does tend towards a microcytosis and has had low iron levels in the past.  I will have this rechecked in the morning.      Plan:  Please see above.  Discussed with patient and wife at bedside.  Resume oral anticoagulation when okay with cardiology and surgery.  Discharge planning.    Robb Baldwin MD  4/2/2022  14:36 EDT

## 2022-04-02 NOTE — PROGRESS NOTES
Postop day 4 status post lap teja IOC    S: No events overnight, feeling much better.  Minimal abdominal pain.  No nausea or vomiting, started on Lovenox yesterday    O:   Vitals:    04/02/22 1025 04/02/22 1034 04/02/22 1326 04/02/22 1401   BP:    150/68   BP Location:    Left arm   Patient Position:    Lying   Pulse: 89 90 82 88   Resp: 20 20 20 20   Temp:    97.7 °F (36.5 °C)   TempSrc:    Oral   SpO2: 99%  99% 98%   Weight:       Height:         Alert, no acute distress  Breathing comfortable  Regular rate rhythm  Abdomen soft, nontender nontender, incisions clean dry and intact, right lower quadrant dressing from prior drain clean dry and intact    Labs reviewed, slightly elevated alk phos  Hemoglobin stable at 9.    Assessment and plan    Postop day 4 status post lap teja IOC, tolerating diet, pain well controlled, having bowel function  Hemoglobin has been stable  Okay to restart warfarin anytime  Can be discharged anytime tomorrow from surgical standpoint.  Follow-up with Dr. Guidry in 2 weeks

## 2022-04-02 NOTE — PLAN OF CARE
Problem: Fall Injury Risk  Goal: Absence of Fall and Fall-Related Injury  Outcome: Ongoing, Progressing  Intervention: Identify and Manage Contributors  Recent Flowsheet Documentation  Taken 4/2/2022 0018 by Harika Demarco RN  Medication Review/Management: medications reviewed  Taken 4/1/2022 2006 by Harika Demarco RN  Medication Review/Management: medications reviewed  Intervention: Promote Injury-Free Environment  Recent Flowsheet Documentation  Taken 4/2/2022 0400 by Hairka Demarco RN  Safety Promotion/Fall Prevention: safety round/check completed  Taken 4/2/2022 0200 by Harika Demarco RN  Safety Promotion/Fall Prevention: safety round/check completed  Taken 4/2/2022 0018 by Harika Demarco RN  Safety Promotion/Fall Prevention: safety round/check completed  Taken 4/1/2022 2200 by Harika Demarco RN  Safety Promotion/Fall Prevention: safety round/check completed  Taken 4/1/2022 2006 by Harika Demarco RN  Safety Promotion/Fall Prevention: safety round/check completed     Problem: Adult Inpatient Plan of Care  Goal: Plan of Care Review  Outcome: Ongoing, Progressing  Flowsheets (Taken 4/2/2022 0422)  Progress: improving  Plan of Care Reviewed With: patient  Goal: Patient-Specific Goal (Individualized)  Outcome: Ongoing, Progressing  Goal: Absence of Hospital-Acquired Illness or Injury  Outcome: Ongoing, Progressing  Intervention: Identify and Manage Fall Risk  Recent Flowsheet Documentation  Taken 4/2/2022 0400 by Harika Demarco RN  Safety Promotion/Fall Prevention: safety round/check completed  Taken 4/2/2022 0200 by Harika Demarco RN  Safety Promotion/Fall Prevention: safety round/check completed  Taken 4/2/2022 0018 by Harika Demarco RN  Safety Promotion/Fall Prevention: safety round/check completed  Taken 4/1/2022 2200 by Harika Demarco RN  Safety Promotion/Fall Prevention: safety round/check completed  Taken 4/1/2022 2006 by Harika Demarco RN  Safety Promotion/Fall  Prevention: safety round/check completed  Intervention: Prevent Skin Injury  Recent Flowsheet Documentation  Taken 4/2/2022 0400 by Harika Demarco RN  Body Position:   turned   left  Taken 4/2/2022 0200 by Harika Demarco RN  Body Position:   tilted   right  Taken 4/2/2022 0018 by Harika Demarco RN  Body Position:   turned   left  Skin Protection: adhesive use limited  Taken 4/1/2022 2200 by Harika eDmarco RN  Body Position:   turned   right  Taken 4/1/2022 2006 by Harika Demarco RN  Body Position:   tilted   left  Skin Protection: adhesive use limited  Intervention: Prevent and Manage VTE (Venous Thromboembolism) Risk  Recent Flowsheet Documentation  Taken 4/2/2022 0400 by Harika Demarco RN  Activity Management: activity adjusted per tolerance  Taken 4/2/2022 0200 by Harika Demarco RN  Activity Management: activity adjusted per tolerance  Taken 4/2/2022 0018 by Harika Demarco RN  Activity Management: activity adjusted per tolerance  VTE Prevention/Management:   bilateral   sequential compression devices off  Taken 4/1/2022 2200 by Harika Demarco RN  Activity Management: activity adjusted per tolerance  Taken 4/1/2022 2006 by Harika Demarco RN  Activity Management: activity adjusted per tolerance  VTE Prevention/Management:   bilateral   sequential compression devices off   patient refused intervention  Intervention: Prevent Infection  Recent Flowsheet Documentation  Taken 4/2/2022 0018 by Harika Demarco RN  Infection Prevention: hand hygiene promoted  Taken 4/1/2022 2006 by Harika Demarco RN  Infection Prevention: rest/sleep promoted  Goal: Optimal Comfort and Wellbeing  Outcome: Ongoing, Progressing  Intervention: Provide Person-Centered Care  Recent Flowsheet Documentation  Taken 4/1/2022 2006 by Harika Demarco RN  Trust Relationship/Rapport: care explained  Goal: Readiness for Transition of Care  Outcome: Ongoing, Progressing     Problem: Skin Injury Risk Increased  Goal:  Skin Health and Integrity  Outcome: Ongoing, Progressing  Intervention: Optimize Skin Protection  Recent Flowsheet Documentation  Taken 4/2/2022 0400 by Harika Demarco RN  Head of Bed (HOB) Positioning: HOB at 20-30 degrees  Taken 4/2/2022 0200 by Harika Demarco RN  Head of Bed (HOB) Positioning: HOB at 20-30 degrees  Taken 4/2/2022 0018 by Harika Deamrco, RN  Pressure Reduction Techniques: frequent weight shift encouraged  Head of Bed (HOB) Positioning: HOB at 20-30 degrees  Pressure Reduction Devices: alternating pressure pump (ADD)  Skin Protection: adhesive use limited  Taken 4/1/2022 2006 by Harika Demarco RN  Pressure Reduction Techniques: frequent weight shift encouraged  Head of Bed (HOB) Positioning: HOB at 20-30 degrees  Pressure Reduction Devices: alternating pressure pump (ADD)  Skin Protection: adhesive use limited     Problem: Diabetes Comorbidity  Goal: Blood Glucose Level Within Targeted Range  Outcome: Ongoing, Progressing  Intervention: Monitor and Manage Glycemia  Recent Flowsheet Documentation  Taken 4/1/2022 2006 by Harika Demarco RN  Glycemic Management: blood glucose monitored     Problem: Heart Failure Comorbidity  Goal: Maintenance of Heart Failure Symptom Control  Outcome: Ongoing, Progressing  Intervention: Maintain Heart Failure-Management  Recent Flowsheet Documentation  Taken 4/2/2022 0018 by Harika Demarco RN  Medication Review/Management: medications reviewed  Taken 4/1/2022 2006 by Harika Demarco, RN  Medication Review/Management: medications reviewed     Problem: Hypertension Comorbidity  Goal: Blood Pressure in Desired Range  Outcome: Ongoing, Progressing  Intervention: Maintain Blood Pressure Management  Recent Flowsheet Documentation  Taken 4/2/2022 0018 by Harika Demarco, RN  Medication Review/Management: medications reviewed  Taken 4/1/2022 2006 by Harika Demarco, RN  Medication Review/Management: medications reviewed   Goal Outcome Evaluation:  Plan of  Care Reviewed With: patient        Progress: improving

## 2022-04-03 ENCOUNTER — APPOINTMENT (OUTPATIENT)
Dept: GENERAL RADIOLOGY | Facility: HOSPITAL | Age: 87
End: 2022-04-03

## 2022-04-03 LAB
ALBUMIN SERPL-MCNC: 2.2 G/DL (ref 3.5–5.2)
ALBUMIN/GLOB SERPL: 0.7 G/DL
ALP SERPL-CCNC: 160 U/L (ref 39–117)
ALT SERPL W P-5'-P-CCNC: 13 U/L (ref 1–41)
ANION GAP SERPL CALCULATED.3IONS-SCNC: 5 MMOL/L (ref 5–15)
AST SERPL-CCNC: 18 U/L (ref 1–40)
BILIRUB SERPL-MCNC: 0.3 MG/DL (ref 0–1.2)
BUN SERPL-MCNC: 12 MG/DL (ref 8–23)
BUN/CREAT SERPL: 12.1 (ref 7–25)
CALCIUM SPEC-SCNC: 7.7 MG/DL (ref 8.6–10.5)
CHLORIDE SERPL-SCNC: 108 MMOL/L (ref 98–107)
CO2 SERPL-SCNC: 26 MMOL/L (ref 22–29)
CREAT SERPL-MCNC: 0.99 MG/DL (ref 0.76–1.27)
DEPRECATED RDW RBC AUTO: 51.1 FL (ref 37–54)
EGFRCR SERPLBLD CKD-EPI 2021: 75.1 ML/MIN/1.73
ERYTHROCYTE [DISTWIDTH] IN BLOOD BY AUTOMATED COUNT: 16.6 % (ref 12.3–15.4)
GLOBULIN UR ELPH-MCNC: 3 GM/DL
GLUCOSE BLDC GLUCOMTR-MCNC: 173 MG/DL (ref 70–130)
GLUCOSE BLDC GLUCOMTR-MCNC: 185 MG/DL (ref 70–130)
GLUCOSE BLDC GLUCOMTR-MCNC: 210 MG/DL (ref 70–130)
GLUCOSE BLDC GLUCOMTR-MCNC: 234 MG/DL (ref 70–130)
GLUCOSE SERPL-MCNC: 147 MG/DL (ref 65–99)
HCT VFR BLD AUTO: 30.2 % (ref 37.5–51)
HGB BLD-MCNC: 9.1 G/DL (ref 13–17.7)
INR PPP: 1.1 (ref 0.9–1.1)
IRON 24H UR-MRATE: 30 MCG/DL (ref 59–158)
IRON SATN MFR SERPL: 17 % (ref 20–50)
MCH RBC QN AUTO: 25.4 PG (ref 26.6–33)
MCHC RBC AUTO-ENTMCNC: 30.1 G/DL (ref 31.5–35.7)
MCV RBC AUTO: 84.4 FL (ref 79–97)
PLATELET # BLD AUTO: 184 10*3/MM3 (ref 140–450)
PMV BLD AUTO: 12.4 FL (ref 6–12)
POTASSIUM SERPL-SCNC: 4.3 MMOL/L (ref 3.5–5.2)
PROT SERPL-MCNC: 5.2 G/DL (ref 6–8.5)
PROTHROMBIN TIME: 14.1 SECONDS (ref 11.7–14.2)
RBC # BLD AUTO: 3.58 10*6/MM3 (ref 4.14–5.8)
SODIUM SERPL-SCNC: 139 MMOL/L (ref 136–145)
TIBC SERPL-MCNC: 176 MCG/DL (ref 298–536)
TRANSFERRIN SERPL-MCNC: 118 MG/DL (ref 200–360)
WBC NRBC COR # BLD: 5.59 10*3/MM3 (ref 3.4–10.8)

## 2022-04-03 PROCEDURE — 82962 GLUCOSE BLOOD TEST: CPT

## 2022-04-03 PROCEDURE — 25010000002 ENOXAPARIN PER 10 MG: Performed by: STUDENT IN AN ORGANIZED HEALTH CARE EDUCATION/TRAINING PROGRAM

## 2022-04-03 PROCEDURE — 85610 PROTHROMBIN TIME: CPT | Performed by: STUDENT IN AN ORGANIZED HEALTH CARE EDUCATION/TRAINING PROGRAM

## 2022-04-03 PROCEDURE — 85027 COMPLETE CBC AUTOMATED: CPT | Performed by: STUDENT IN AN ORGANIZED HEALTH CARE EDUCATION/TRAINING PROGRAM

## 2022-04-03 PROCEDURE — 94799 UNLISTED PULMONARY SVC/PX: CPT

## 2022-04-03 PROCEDURE — 36415 COLL VENOUS BLD VENIPUNCTURE: CPT | Performed by: STUDENT IN AN ORGANIZED HEALTH CARE EDUCATION/TRAINING PROGRAM

## 2022-04-03 PROCEDURE — 80053 COMPREHEN METABOLIC PANEL: CPT | Performed by: STUDENT IN AN ORGANIZED HEALTH CARE EDUCATION/TRAINING PROGRAM

## 2022-04-03 PROCEDURE — 84466 ASSAY OF TRANSFERRIN: CPT | Performed by: HOSPITALIST

## 2022-04-03 PROCEDURE — 83540 ASSAY OF IRON: CPT | Performed by: HOSPITALIST

## 2022-04-03 PROCEDURE — 94664 DEMO&/EVAL PT USE INHALER: CPT

## 2022-04-03 PROCEDURE — 99024 POSTOP FOLLOW-UP VISIT: CPT | Performed by: SURGERY

## 2022-04-03 PROCEDURE — 71046 X-RAY EXAM CHEST 2 VIEWS: CPT

## 2022-04-03 RX ORDER — WARFARIN SODIUM 10 MG/1
10 TABLET ORAL
Status: DISCONTINUED | OUTPATIENT
Start: 2022-04-03 | End: 2022-04-04 | Stop reason: HOSPADM

## 2022-04-03 RX ORDER — FUROSEMIDE 40 MG/1
40 TABLET ORAL DAILY
Status: DISCONTINUED | OUTPATIENT
Start: 2022-04-03 | End: 2022-04-04 | Stop reason: HOSPADM

## 2022-04-03 RX ADMIN — ENOXAPARIN SODIUM 70 MG: 100 INJECTION SUBCUTANEOUS at 20:07

## 2022-04-03 RX ADMIN — WARFARIN 10 MG: 10 TABLET ORAL at 18:11

## 2022-04-03 RX ADMIN — FERROUS SULFATE TAB 325 MG (65 MG ELEMENTAL FE) 325 MG: 325 (65 FE) TAB at 09:22

## 2022-04-03 RX ADMIN — ENOXAPARIN SODIUM 70 MG: 100 INJECTION SUBCUTANEOUS at 09:22

## 2022-04-03 RX ADMIN — GUAIFENESIN 1200 MG: 600 TABLET, EXTENDED RELEASE ORAL at 20:07

## 2022-04-03 RX ADMIN — IPRATROPIUM BROMIDE AND ALBUTEROL SULFATE 3 ML: 2.5; .5 SOLUTION RESPIRATORY (INHALATION) at 13:10

## 2022-04-03 RX ADMIN — MAGNESIUM OXIDE 400 MG (241.3 MG MAGNESIUM) TABLET 400 MG: TABLET at 20:07

## 2022-04-03 RX ADMIN — FUROSEMIDE 40 MG: 40 TABLET ORAL at 18:11

## 2022-04-03 RX ADMIN — METOPROLOL SUCCINATE 12.5 MG: 25 TABLET, EXTENDED RELEASE ORAL at 09:22

## 2022-04-03 RX ADMIN — DIGOXIN 62.5 MCG: 125 TABLET ORAL at 12:12

## 2022-04-03 RX ADMIN — IPRATROPIUM BROMIDE AND ALBUTEROL SULFATE 3 ML: 2.5; .5 SOLUTION RESPIRATORY (INHALATION) at 19:09

## 2022-04-03 RX ADMIN — Medication 10 ML: at 20:07

## 2022-04-03 RX ADMIN — VANCOMYCIN HYDROCHLORIDE 125 MG: 125 CAPSULE ORAL at 09:22

## 2022-04-03 RX ADMIN — IPRATROPIUM BROMIDE AND ALBUTEROL SULFATE 3 ML: 2.5; .5 SOLUTION RESPIRATORY (INHALATION) at 16:30

## 2022-04-03 RX ADMIN — IPRATROPIUM BROMIDE AND ALBUTEROL SULFATE 3 ML: 2.5; .5 SOLUTION RESPIRATORY (INHALATION) at 07:25

## 2022-04-03 RX ADMIN — GUAIFENESIN 1200 MG: 600 TABLET, EXTENDED RELEASE ORAL at 09:22

## 2022-04-03 RX ADMIN — Medication 10 ML: at 09:27

## 2022-04-03 RX ADMIN — MAGNESIUM OXIDE 400 MG (241.3 MG MAGNESIUM) TABLET 400 MG: TABLET at 09:22

## 2022-04-03 NOTE — PLAN OF CARE
Goal Outcome Evaluation:  Plan of Care Reviewed With: patient        Progress: improving  Pt feeling a lot better today.  Currently stable.  Pt to resume warfarin for anticoagulation today and discont lovenox injections.  Ambulated patient around nurses station, no complaints.  We continue to monitor.

## 2022-04-03 NOTE — PROGRESS NOTES
[facility] Clinical Pharmacy Services: Warfarin Dosing/Monitoring Consult    Francisco Sequeira is a 86 y.o. male, estimated creatinine clearance is 56.3 mL/min (by C-G formula based on SCr of 0.99 mg/dL). weighing 71.7 kg (158 lb 1.1 oz).    Results from last 7 days   Lab Units 04/03/22  0617 04/02/22  0456 04/01/22  0502 03/31/22  0452 03/30/22  1625 03/30/22  0559   INR  1.10 1.14* 1.28* 1.35*  --  1.31*   HEMOGLOBIN g/dL 9.1* 9.0* 9.0* 9.1* 9.8* 9.4*   HEMATOCRIT % 30.2* 29.2* 28.5* 29.4* 31.2* 29.3*   PLATELETS 10*3/mm3 184 193 158 170 134* 172     Prior to admission anticoagulation: 7.5 mg every Mon, Wed, Fri; 5 mg all other days    Hospital Anticoagulation:  Consulting provider: Stevo Vilchis  Start date: 4/3  Indication: mechanical valve  Target INR:  3-3.5 per anticoag clinic as well as pt spouse  Expected duration: Indefinite   Bridge Therapy: Yes  with enoxaparin    Potential food or drug interactions:     Education complete?/Date: Yes; PTA    Assessment/Plan:  1. Dose: 10mg daily x 2 days, then expect to resume home regimen pending INR trend. If pt is discharged before INR is therapeutic, would consider lovenox bridge as pt has a CVA Hx. Pt is followed by the anticoag clinic at [facility], would recommend close follow up at discharge.   2. Monitor for any signs or symptoms of bleeding  3. Follow up daily INRs and dose adjustments    Pharmacy will continue to follow until discharge or discontinuation of warfarin.     Christina Johnson, [facility]  Clinical Pharmacist

## 2022-04-03 NOTE — PROGRESS NOTES
Postop day 5 status post lap teja IOC    No events overnight, tolerating diet, no nausea no vomiting    O:   Vitals:    04/03/22 0540 04/03/22 0725 04/03/22 0737 04/03/22 0755   BP:    178/98   BP Location:    Right arm   Patient Position:    Lying   Pulse:  87 85 93   Resp:  20 20 20   Temp:    97.2 °F (36.2 °C)   TempSrc:    Oral   SpO2:  100%  99%   Weight: 71.7 kg (158 lb 1.1 oz)      Height:         Alert, no acute distress  Abdomen soft, mildly tender to palpation over incisions, nondistended, incisions clean dry and intact    Mildly elevated alk phos, will need to have repeat in 2 weeks  Hemoglobin 9.1, stable    Plan:   -  Restart Coumadin  - Okay to discharge anytime from surgical standpoint when patient has an anticoagulation plan  -Follow-up with Dr. Guidry in 2 weeks, will need to have repeat LFTs  -We will see as needed

## 2022-04-03 NOTE — PROGRESS NOTES
"DAILY PROGRESS NOTE  [facility]    Patient Identification:  Name: Francisco Sequeira  Age: 86 y.o.  Sex: male  :  1935  MRN: [id number]         Primary Care Physician: Rubin Hinkle APRN      Subjective  Patient with no acute complaints.  States he continues to feel better.  He has been up and ambulating with the nursing staff today.    Objective:  General Appearance:  Comfortable, well-appearing, in no acute distress and not in pain.    Vital signs: (most recent): Blood pressure 178/98, pulse 76, temperature 97.2 °F (36.2 °C), temperature source Oral, resp. rate 20, height 182.9 cm (72\"), weight 71.7 kg (158 lb 1.1 oz), SpO2 100 %.    Lungs:  Normal effort and normal respiratory rate.  He is not in respiratory distress.  No stridor.  There are decreased breath sounds.  No rales, wheezes or rhonchi.  (Developing basilar dullness again, predominantly right base.)  Heart: Normal rate.  Irregular rhythm.    Extremities: There is dependent edema.  (1+ pretibial pitting edema right lower extremity, 2+ left lower extremity.)  Skin:  Warm and dry.  (Chronic stasis dermatitis changes in the shins bilaterally.)              Vital signs in last 24 hours:  Temp:  [97.2 °F (36.2 °C)-98 °F (36.7 °C)] 97.2 °F (36.2 °C)  Heart Rate:  [76-95] 76  Resp:  [16-20] 20  BP: (128-178)/(68-98) 178/98    Intake/Output:    Intake/Output Summary (Last 24 hours) at 4/3/2022 1316  Last data filed at 4/3/2022 1141  Gross per 24 hour   Intake 120 ml   Output --   Net 120 ml         Results from last 7 days   Lab Units 22  0617 22  0456 22  0502 22  0452 22  1625 22  0559 22  0510   WBC 10*3/mm3 5.59 7.92 6.87 11.59* 3.13* 7.60 6.47   HEMOGLOBIN g/dL 9.1* 9.0* 9.0* 9.1* 9.8* 9.4* 9.5*   PLATELETS 10*3/mm3 184 193 158 170 134* 172 170     Results from last 7 days   Lab Units 22  0617 22  0456 22  0502 22  0452 22  1625 22  0559 22  0510 "   SODIUM mmol/L 139 143 142 142 141 142 144   POTASSIUM mmol/L 4.3 3.8 3.6 3.9 4.0 3.9 3.6   CHLORIDE mmol/L 108* 112* 112* 111* 109* 110* 110*   CO2 mmol/L 26.0 26.0 23.9 22.9 21.0* 21.2* 23.0   BUN mg/dL 12 17 22 24* 20 16 13   CREATININE mg/dL 0.99 1.00 1.25 1.53* 1.36* 1.07 1.02   GLUCOSE mg/dL 147* 75 77 144* 159* 159* 117*   Estimated Creatinine Clearance: 56.3 mL/min (by C-G formula based on SCr of 0.99 mg/dL).  Results from last 7 days   Lab Units 04/03/22  0617 04/02/22  0456 04/01/22  0502 03/31/22  0452 03/30/22  1625 03/30/22  0559 03/29/22  0510 03/28/22  0541   CALCIUM mg/dL 7.7* 7.5* 7.4* 7.5* 7.4* 7.5* 7.7* 7.6*   ALBUMIN g/dL 2.20* 2.40* 1.90* 2.10*  --  2.10* 2.30* 2.10*     Results from last 7 days   Lab Units 04/03/22  0617 04/02/22  0456 04/01/22  0502 03/31/22  0452 03/30/22  0559 03/29/22  0510 03/28/22  0541   ALBUMIN g/dL 2.20* 2.40* 1.90* 2.10* 2.10* 2.30* 2.10*   BILIRUBIN mg/dL 0.3 0.3 0.2 0.3 0.4 0.5 0.5   ALK PHOS U/L 160* 158* 151* 140* 146* 144* 125*   AST (SGOT) U/L 18 21 30 35 31 15 14   ALT (SGPT) U/L 13 11 12 12 10 8 7       Assessment:  Acute cholecystitis: Status post laparoscopic cholecystectomy 3/29/2022.   Postop hemoglobin holding steady.  BYRON/dehydration: Resolved.  Atrial fibrillation: Cardiology input appreciated. Presently with good rate control.  On Lovenox for anticoagulation.  Transitioning to oral Coumadin.  Mechanical aortic valve: See above.  Aspiration pneumonia: Infectious disease input appreciated.  Treatment completed.  Right pleural effusion: Status post thoracentesis with 1700 cc of serosanguineous fluid removed 3/30/2022.   Previous thoracenteses on 11/11/2021 and 10/25/2021.  Appears to be developing basilar dullness again.  Will have chest x-ray rechecked.  History of C. difficile colitis: Infectious disease input appreciated.  Continue oral vancomycin.  Clinically no evidence of recurrence.  :  Lab error   History nephrectomy/chronic kidney  disease:  Diabetes type 2:   Presently with good control with his home insulin pump back in place.    Severe pulmonary hypertension: Last RV systolic pressure on echocardiogram estimated at 63 mmHg.    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  Probable vasovagal episode resolved.  Anemia: Acute on chronic.    Acute postop anemia-expected.  Iron deficiency anemia noted on iron panel today.  Will initiate oral iron replacement.  Anemia chronic disease.  Pretib edema: Multifactorial including pulmonary hypertension, hypoalbuminemia.  Resume p.o. Lasix.         Plan:  Please see above.  Discharge planning.    Robb Baldwin MD  4/3/2022  13:16 EDT

## 2022-04-03 NOTE — PLAN OF CARE
Goal Outcome Evaluation:  Plan of Care Reviewed With: patient        Progress: improving  Outcome Evaluation: VSS. Resting well this shift. No c/o pain. A&Ox4. Saftey maintained.

## 2022-04-04 ENCOUNTER — READMISSION MANAGEMENT (OUTPATIENT)
Dept: CALL CENTER | Facility: HOSPITAL | Age: 87
End: 2022-04-04

## 2022-04-04 VITALS
HEART RATE: 88 BPM | DIASTOLIC BLOOD PRESSURE: 65 MMHG | BODY MASS INDEX: 21.23 KG/M2 | TEMPERATURE: 98.2 F | SYSTOLIC BLOOD PRESSURE: 129 MMHG | HEIGHT: 72 IN | RESPIRATION RATE: 16 BRPM | WEIGHT: 156.75 LBS | OXYGEN SATURATION: 92 %

## 2022-04-04 LAB
ANION GAP SERPL CALCULATED.3IONS-SCNC: 5.3 MMOL/L (ref 5–15)
BUN SERPL-MCNC: 10 MG/DL (ref 8–23)
BUN/CREAT SERPL: 10.4 (ref 7–25)
CALCIUM SPEC-SCNC: 8.2 MG/DL (ref 8.6–10.5)
CHLORIDE SERPL-SCNC: 107 MMOL/L (ref 98–107)
CO2 SERPL-SCNC: 26.7 MMOL/L (ref 22–29)
CREAT SERPL-MCNC: 0.96 MG/DL (ref 0.76–1.27)
DEPRECATED RDW RBC AUTO: 50.1 FL (ref 37–54)
EGFRCR SERPLBLD CKD-EPI 2021: 77.9 ML/MIN/1.73
ERYTHROCYTE [DISTWIDTH] IN BLOOD BY AUTOMATED COUNT: 17.2 % (ref 12.3–15.4)
GLUCOSE BLDC GLUCOMTR-MCNC: 157 MG/DL (ref 70–130)
GLUCOSE BLDC GLUCOMTR-MCNC: 198 MG/DL (ref 70–130)
GLUCOSE SERPL-MCNC: 143 MG/DL (ref 65–99)
HCT VFR BLD AUTO: 28.7 % (ref 37.5–51)
HGB BLD-MCNC: 9.1 G/DL (ref 13–17.7)
INR PPP: 1.1 (ref 0.9–1.1)
MCH RBC QN AUTO: 25.9 PG (ref 26.6–33)
MCHC RBC AUTO-ENTMCNC: 31.7 G/DL (ref 31.5–35.7)
MCV RBC AUTO: 81.5 FL (ref 79–97)
PLATELET # BLD AUTO: 201 10*3/MM3 (ref 140–450)
PMV BLD AUTO: 12.1 FL (ref 6–12)
POTASSIUM SERPL-SCNC: 4.3 MMOL/L (ref 3.5–5.2)
PROTHROMBIN TIME: 14.1 SECONDS (ref 11.7–14.2)
RBC # BLD AUTO: 3.52 10*6/MM3 (ref 4.14–5.8)
SODIUM SERPL-SCNC: 139 MMOL/L (ref 136–145)
WBC NRBC COR # BLD: 6.36 10*3/MM3 (ref 3.4–10.8)

## 2022-04-04 PROCEDURE — 80048 BASIC METABOLIC PNL TOTAL CA: CPT | Performed by: HOSPITALIST

## 2022-04-04 PROCEDURE — 82962 GLUCOSE BLOOD TEST: CPT

## 2022-04-04 PROCEDURE — 99232 SBSQ HOSP IP/OBS MODERATE 35: CPT | Performed by: NURSE PRACTITIONER

## 2022-04-04 PROCEDURE — 25010000002 ENOXAPARIN PER 10 MG: Performed by: STUDENT IN AN ORGANIZED HEALTH CARE EDUCATION/TRAINING PROGRAM

## 2022-04-04 PROCEDURE — 85610 PROTHROMBIN TIME: CPT | Performed by: STUDENT IN AN ORGANIZED HEALTH CARE EDUCATION/TRAINING PROGRAM

## 2022-04-04 PROCEDURE — 36415 COLL VENOUS BLD VENIPUNCTURE: CPT | Performed by: STUDENT IN AN ORGANIZED HEALTH CARE EDUCATION/TRAINING PROGRAM

## 2022-04-04 PROCEDURE — 94761 N-INVAS EAR/PLS OXIMETRY MLT: CPT

## 2022-04-04 PROCEDURE — 85027 COMPLETE CBC AUTOMATED: CPT | Performed by: STUDENT IN AN ORGANIZED HEALTH CARE EDUCATION/TRAINING PROGRAM

## 2022-04-04 PROCEDURE — 94799 UNLISTED PULMONARY SVC/PX: CPT

## 2022-04-04 PROCEDURE — 94664 DEMO&/EVAL PT USE INHALER: CPT

## 2022-04-04 RX ORDER — SACCHAROMYCES BOULARDII 250 MG
250 CAPSULE ORAL 2 TIMES DAILY
Qty: 60 CAPSULE | Refills: 0 | Status: SHIPPED | OUTPATIENT
Start: 2022-04-04 | End: 2022-05-18 | Stop reason: HOSPADM

## 2022-04-04 RX ORDER — L.ACID,PARA/B.BIFIDUM/S.THERM 8B CELL
1 CAPSULE ORAL DAILY
Qty: 30 CAPSULE | Refills: 0 | Status: SHIPPED | OUTPATIENT
Start: 2022-04-04 | End: 2022-05-18 | Stop reason: HOSPADM

## 2022-04-04 RX ORDER — VANCOMYCIN HYDROCHLORIDE 125 MG/1
125 CAPSULE ORAL EVERY OTHER DAY
Qty: 7 CAPSULE | Refills: 0 | Status: SHIPPED | OUTPATIENT
Start: 2022-04-04 | End: 2022-04-20 | Stop reason: HOSPADM

## 2022-04-04 RX ORDER — FAMOTIDINE 20 MG/1
20 TABLET, FILM COATED ORAL
Qty: 60 TABLET | Refills: 0 | Status: SHIPPED | OUTPATIENT
Start: 2022-04-04 | End: 2022-05-18 | Stop reason: HOSPADM

## 2022-04-04 RX ORDER — WARFARIN SODIUM 5 MG/1
7.5 TABLET ORAL NIGHTLY
Qty: 115 TABLET | Refills: 1
Start: 2022-04-04 | End: 2022-05-18 | Stop reason: HOSPADM

## 2022-04-04 RX ADMIN — METOPROLOL SUCCINATE 12.5 MG: 25 TABLET, EXTENDED RELEASE ORAL at 08:50

## 2022-04-04 RX ADMIN — IPRATROPIUM BROMIDE AND ALBUTEROL SULFATE 3 ML: 2.5; .5 SOLUTION RESPIRATORY (INHALATION) at 08:30

## 2022-04-04 RX ADMIN — FUROSEMIDE 40 MG: 40 TABLET ORAL at 08:49

## 2022-04-04 RX ADMIN — VANCOMYCIN HYDROCHLORIDE 125 MG: 125 CAPSULE ORAL at 08:51

## 2022-04-04 RX ADMIN — GUAIFENESIN 1200 MG: 600 TABLET, EXTENDED RELEASE ORAL at 08:50

## 2022-04-04 RX ADMIN — Medication 10 ML: at 08:51

## 2022-04-04 RX ADMIN — IPRATROPIUM BROMIDE AND ALBUTEROL SULFATE 3 ML: 2.5; .5 SOLUTION RESPIRATORY (INHALATION) at 12:24

## 2022-04-04 RX ADMIN — MAGNESIUM OXIDE 400 MG (241.3 MG MAGNESIUM) TABLET 400 MG: TABLET at 08:50

## 2022-04-04 RX ADMIN — ENOXAPARIN SODIUM 70 MG: 100 INJECTION SUBCUTANEOUS at 08:50

## 2022-04-04 NOTE — PROGRESS NOTES
"    Patient Name: Francisco Sequeira  :1935  86 y.o.      Patient Care Team:  Rubin Hinkle APRN as PCP - General (Family Medicine)  Audra Vazquez RPH as Pharmacist  Vasquez Niño PharmD as Pharmacist (Pharmacy)  Tatiana Tinoco MD as Consulting Physician (Gastroenterology)    Chief Complaint: follow up mechanical aortic valve, cholecystitis     Interval History: warfarin restarted 4/3. He is sitting up in the chair. He looks and feels really good. He wants to go home.     Objective   Vital Signs  Temp:  [97.8 °F (36.6 °C)-98.2 °F (36.8 °C)] 98.2 °F (36.8 °C)  Heart Rate:  [] 89  Resp:  [16-20] 16  BP: (129-146)/(65-72) 129/65    Intake/Output Summary (Last 24 hours) at 2022 1159  Last data filed at 2022 0510  Gross per 24 hour   Intake 140 ml   Output 725 ml   Net -585 ml     Flowsheet Rows    Flowsheet Row First Filed Value   Admission Height 182.9 cm (72\") Documented at 2022 1316   Admission Weight 71 kg (156 lb 9.6 oz) Documented at 2022 1316          Physical Exam:   General Appearance:    Alert, cooperative, in no acute distress   Lungs:     Clear to auscultation.  Normal respiratory effort and rate.      Heart:    irregular rhythm and normal rate, normal S1 and S2, no murmurs, gallops or rubs.  Click     Chest Wall:    No abnormalities observed   Abdomen:     Soft, nontender, positive bowel sounds.     Extremities:   no cyanosis, clubbing or edema.  No marked joint deformities.  Adequate musculoskeletal strength.       Results Review:    Results from last 7 days   Lab Units 22  0711   SODIUM mmol/L 139   POTASSIUM mmol/L 4.3   CHLORIDE mmol/L 107   CO2 mmol/L 26.7   BUN mg/dL 10   CREATININE mg/dL 0.96   GLUCOSE mg/dL 143*   CALCIUM mg/dL 8.2*     Results from last 7 days   Lab Units 22  1625   TROPONIN T ng/mL 0.018     Results from last 7 days   Lab Units 22  0711   WBC 10*3/mm3 6.36   HEMOGLOBIN g/dL 9.1*   HEMATOCRIT % 28.7*   PLATELETS 10*3/mm3 201 "     Results from last 7 days   Lab Units 04/04/22  0711 04/03/22  0617 04/02/22  0456   INR  1.10 1.10 1.14*                       Medication Review:   digoxin, 62.5 mcg, Oral, Daily  enoxaparin, 1 mg/kg, Subcutaneous, Q12H  ferrous sulfate, 325 mg, Oral, Every Other Day  furosemide, 40 mg, Oral, Daily  guaiFENesin, 1,200 mg, Oral, Q12H  ipratropium-albuterol, 3 mL, Nebulization, 4x Daily - RT  magnesium oxide, 400 mg, Oral, BID  metoprolol succinate XL, 12.5 mg, Oral, Daily  Morphine, 2 mg, Intravenous, Once  sodium chloride, 10 mL, Intravenous, Q12H  vancomycin, 125 mg, Oral, Daily  warfarin, 10 mg, Oral, Daily         hold, 1 each  lactated ringers, 9 mL/hr, Last Rate: 9 mL/hr (03/29/22 1815)  Pharmacy to dose warfarin,         Assessment/Plan      1. Acute cholecystitis - status post lap teja  2. Mechanical aortic valve  3. Permanent atrial fibrillation  4. Stroke (INR 2.2 at the time).   5. Chronic anticoagulation with warfarin. Followed by the [facility] clinic. Status post 2.5 vitmamin K 3/28  6. History of GI bleeding, AVM on colonoscopy.   7. Chronic diastolic heart failure, target INR 3-3.5  8. Right pleural effusion - thoracentesis 10/25/21 and 11/11/21. Repeat thoracentesis 3/30 with 1700 mL drained.     Inr 1.1. remains on lovenox bridge. Wife says she has lovenox at home.   He follows closely with the medication management clinic. Defer dosing to them.   No objection to dc from cardiac standpoint. He should have an INR in 2-3 days.    LIZA Krishnamurthy  [facility]  04/04/22  11:59 EDT

## 2022-04-04 NOTE — PROGRESS NOTES
"DAILY PROGRESS NOTE  [facility]    Patient Identification:  Name: Francisco Sequeira  Age: 86 y.o.  Sex: male  :  1935  MRN: [id number]         Primary Care Physician: Rubin Hinkle APRN      Subjective  Patient with no acute complaints.  States he is feeling well.    Objective:  General Appearance:  Comfortable, well-appearing, in no acute distress and not in pain.    Vital signs: (most recent): Blood pressure 129/65, pulse 89, temperature 98.2 °F (36.8 °C), temperature source Oral, resp. rate 16, height 182.9 cm (72\"), weight 71.1 kg (156 lb 12 oz), SpO2 100 %.    Lungs:  Normal effort and normal respiratory rate.  He is not in respiratory distress.  No stridor.  There are decreased breath sounds.  No rales, wheezes or rhonchi.  (Bibasilar dullness.  The degree of dullness is much improved with the patient sitting upright.)  Heart: Normal rate.  Irregular rhythm.  (Prosthetic click.)  Extremities: There is dependent edema.  (Minimal/trace pretibial edema.  Improved from yesterday.)  Neurological: Patient is alert and oriented to person, place and time.    Skin:  Warm and dry.                Vital signs in last 24 hours:  Temp:  [97.8 °F (36.6 °C)-98.2 °F (36.8 °C)] 98.2 °F (36.8 °C)  Heart Rate:  [] 89  Resp:  [16-20] 16  BP: (129-146)/(65-72) 129/65    Intake/Output:    Intake/Output Summary (Last 24 hours) at 2022 1005  Last data filed at 2022 0510  Gross per 24 hour   Intake 260 ml   Output 725 ml   Net -465 ml         Results from last 7 days   Lab Units 22  0711 22  0617 22  0456 22  0502 22  0452 22  1625 22  0559   WBC 10*3/mm3 6.36 5.59 7.92 6.87 11.59* 3.13* 7.60   HEMOGLOBIN g/dL 9.1* 9.1* 9.0* 9.0* 9.1* 9.8* 9.4*   PLATELETS 10*3/mm3 201 184 193 158 170 134* 172     Results from last 7 days   Lab Units 22  0711 22  0617 22  0456 22  0502 22  0452 22  1625 22  0559   SODIUM mmol/L " 139 139 143 142 142 141 142   POTASSIUM mmol/L 4.3 4.3 3.8 3.6 3.9 4.0 3.9   CHLORIDE mmol/L 107 108* 112* 112* 111* 109* 110*   CO2 mmol/L 26.7 26.0 26.0 23.9 22.9 21.0* 21.2*   BUN mg/dL 10 12 17 22 24* 20 16   CREATININE mg/dL 0.96 0.99 1.00 1.25 1.53* 1.36* 1.07   GLUCOSE mg/dL 143* 147* 75 77 144* 159* 159*   Estimated Creatinine Clearance: 57.6 mL/min (by C-G formula based on SCr of 0.96 mg/dL).  Results from last 7 days   Lab Units 04/04/22  0711 04/03/22  0617 04/02/22  0456 04/01/22  0502 03/31/22  0452 03/30/22  1625 03/30/22  0559 03/29/22  0510   CALCIUM mg/dL 8.2* 7.7* 7.5* 7.4* 7.5* 7.4* 7.5* 7.7*   ALBUMIN g/dL  --  2.20* 2.40* 1.90* 2.10*  --  2.10* 2.30*     Results from last 7 days   Lab Units 04/03/22  0617 04/02/22  0456 04/01/22  0502 03/31/22  0452 03/30/22  0559 03/29/22  0510   ALBUMIN g/dL 2.20* 2.40* 1.90* 2.10* 2.10* 2.30*   BILIRUBIN mg/dL 0.3 0.3 0.2 0.3 0.4 0.5   ALK PHOS U/L 160* 158* 151* 140* 146* 144*   AST (SGOT) U/L 18 21 30 35 31 15   ALT (SGPT) U/L 13 11 12 12 10 8       Assessment:  Acute cholecystitis: Status post laparoscopic cholecystectomy 3/29/2022.   Postop hemoglobin holding steady.  BYRON/dehydration: Resolved.  Atrial fibrillation: Cardiology input appreciated. Presently with good rate control.  On Lovenox for anticoagulation.  Transitioning to oral Coumadin.  Mechanical aortic valve: See above.  Aspiration pneumonia: Infectious disease input appreciated.  Treatment completed.  Right pleural effusion: Status post thoracentesis with 1700 cc of serosanguineous fluid removed 3/30/2022.   Previous thoracenteses on 11/11/2021 and 10/25/2021.   Yesterday's chest x-ray shows slight increase in effusion from his previous x-ray but much improved from admitting x-ray.  Lasix has been resumed.  History of C. difficile colitis: Infectious disease input appreciated.  Continue oral vancomycin.  Clinically no evidence of recurrence.  :  Lab error   History nephrectomy/chronic kidney  disease:  Diabetes type 2:   Presently with good control with his home insulin pump back in place.    Severe pulmonary hypertension: Last RV systolic pressure on echocardiogram estimated at 63 mmHg.    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  Probable vasovagal episode resolved.  Anemia: Acute on chronic.    Acute postop anemia-expected.  Iron deficiency anemia noted on iron panel today.  Will initiate oral iron replacement.  Anemia chronic disease.  Postop hemoglobin now holding steady.  Pretib edema: Multifactorial including pulmonary hypertension, hypoalbuminemia.    Much improved today.  P.o. Lasix resumed.      Plan:  Please see above.  Okay for discharge from my point of view.  Discussed with wife at bedside.  Discussed with CCP.    Robb Baldwin MD  4/4/2022  10:05 EDT

## 2022-04-04 NOTE — PLAN OF CARE
Goal Outcome Evaluation:  Plan of Care Reviewed With: patient        Progress: no change  Outcome Evaluation: VSS. Resting well this shift. No c/o pain. A&Ox4. Saftey maintained.

## 2022-04-04 NOTE — OUTREACH NOTE
Prep Survey    Flowsheet Row Responses   [facility] facility patient discharged from? [place]   Is LACE score < 7 ? No   Emergency Room discharge w/ pulse ox? No   Eligibility [facility]   Date of Admission 03/23/22   Date of Discharge 04/04/22   Discharge Disposition Home-Health Care Svc   Discharge diagnosis Laparoscopic cholecystectomy with cholangiogram, possible open   Does the patient have one of the following disease processes/diagnoses(primary or secondary)? General Surgery   Does the patient have Home health ordered? Yes   What is the Home health agency?  [facility]   Is there a DME ordered? No   Prep survey completed? Yes          CHYNA HAYWARD - Registered Nurse

## 2022-04-04 NOTE — PAYOR COMM NOTE
"Laura Xie (86 y.o. Male)     Please see attached DC SUMMARY      REF#[id number]    THANK YOU    NAVDEEP LIEBERMAN LPN CCP            Date of Birth   1935    Social Security Number       Address   [address] [place] [address]    Home Phone       MRN   [id number]       Congregational   Confucianism    Marital Status                               Admission Date   3/23/22    Admission Type   Emergency    Admitting Provider   Louis Jackson MD    Attending Provider       Department, Room/Bed   [address], [address]/1       Discharge Date   4/4/2022    Discharge Disposition   Home-Health Care [facility] [place]    Discharge Destination                               Attending Provider: (none)   Allergies: Other, Penicillins, Percocet [Oxycodone-acetaminophen]    Isolation: Spore, Contact   Infection: C.difficile (02/15/22), MRSA (03/23/22)   Code Status: CPR   Advance Care Planning Activity    Ht: 182.9 cm (72\")   Wt: 71.1 kg (156 lb 12 oz)    Admission Cmt: None   Principal Problem: Calculus of gallbladder with acute cholecystitis without obstruction [K80.00]                 Active Insurance as of 3/23/2022     Primary Coverage     Payor Plan Insurance Group Employer/Plan Group    ANTHEM BLUE CROSS ANTHEM BLUE CROSS BLUE SHIELD PPO [id number]     Payor Plan Address Payor Plan Phone Number Payor Plan Fax Number Effective Dates    [address] [phone]  1/1/2022 - None Entered    [facility] [id number]       Subscriber Name Subscriber Birth Date Member ID       ROJAS,PATRICIA 7/11/1955 [id number]           Secondary Coverage     Payor Plan Insurance Group Employer/Plan Group    MEDICARE MEDICARE A & B      Payor Plan Address Payor Plan Phone Number Payor Plan Fax Number Effective Dates    [address] [phone]  11/1/2002 - None Entered    [facility] [id number]       Subscriber Name Subscriber Birth Date Member ID       LAURA XIE 1935 [id number]           "       Emergency Contacts      (Rel.) Home Phone Work Phone Mobile Phone    Gladys Sequeira (Spouse) [phone] -- [phone]    Bruce Silva (Son) [phone] -- [phone]               Discharge Summary      Robb Baldwin MD at 22 1033                                                                             PHYSICIAN DISCHARGE SUMMARY                                                                        [facility]    Patient Identification:  Name: Francisco Sequeira  Age: 86 y.o.  Sex: male  :  1935  MRN: [id number]  Primary Care Physician: Rubin Hinkle APRN    Admit date: 3/23/2022  Discharge date and time: 2022     Discharged Condition: good    Discharge Diagnoses:   Acute cholecystitis: Status post laparoscopic cholecystectomy 3/29/2022.     BYRON/dehydration: Resolved.  Atrial fibrillation:  On Lovenox for anticoagulation.  Transitioning to oral Coumadin.  Mechanical aortic valve: See above.  Aspiration pneumonia:   Right pleural effusion: Status post thoracentesis with 1700 cc of serosanguineous fluid removed 3/30/2022.     History of C. difficile colitis:  History nephrectomy/chronic kidney disease:  Diabetes type 2:       Severe pulmonary hypertension: Last RV systolic pressure on echocardiogram estimated at 63 mmHg.    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  Pretib edema: .      Hospital Course:  Very pleasant 86-year-old gentleman with multiple medical issues including atrial fibrillation and mechanical prosthetic aortic valve presenting with acute cholecystitis as well as aspiration ammonia.  Please H&P for full details.  Aspiration pneumonia was treated via pulmonology.     Coumadin was put on hold and INR corrected to a safe range.  Once cleared for surgery did undergo successful laparoscopic cholecystectomy.  He does have a history of severe pulmonary hypertension  recurrent pleural effusions.  Postop there was issue with recurrence of the pleural effusion again and he did undergo thoracentesis with 1700 cc removed.  Once cleared by surgery anticoagulation was resumed in the form of full dose Lovenox.  He is tolerating this well and Coumadin is now being resumed and he will need to transition of the tube until his INR is therapeutic.  At this point he is now afebrile vital signs stable and can be discharge remainder his treatment follow-up as an outpatient.  Arrangements for home health for both nursing services and physical therapy are being arranged.  Also arranged for daily PT/INRs until his INR is therapeutic.  This was discussed with CCP and initially orders were put into epic however there are issues with epic blocking the discharge with this.  It had to be modified with the help of technical support.  Thankfully his wife is also very familiar with managing of his anticoagulation.  She is administered Lovenox in the past and also helps manage his diabetes.      Consults:     Consults     Date and Time Order Name Status Description    3/24/2022  9:21 AM Inpatient Infectious Diseases Consult Completed     3/24/2022  9:21 AM Inpatient Pulmonology Consult Completed     3/23/2022  1:38 PM Inpatient Cardiology Consult Completed     3/23/2022  1:32 PM Inpatient General Surgery Consult Completed     3/23/2022 10:39 AM Surgery (on-call MD unless specified) Completed     3/23/2022 10:39 AM [facility] (on-call MD unless specified) Details      2/23/2022  1:27 PM Inpatient Infectious Diseases Consult Completed     2/17/2022 12:19 PM Inpatient Cardiology Consult Completed     2/15/2022 10:06 AM Inpatient Gastroenterology Consult Completed             Discharge Exam:  General Appearance:  Comfortable, well-appearing, in no acute distress and not in pain.    Vital signs: (most recent): Blood pressure 129/65, pulse 89, temperature 98.2 °F (36.8 °C), temperature source Oral, resp. rate 16,  "height 182.9 cm (72\"), weight 71.1 kg (156 lb 12 oz), SpO2 100 %.    Lungs:  Normal effort and normal respiratory rate.  He is not in respiratory distress.  No stridor.  There are decreased breath sounds.  No rales, wheezes or rhonchi.  (Bibasilar dullness.  The degree of dullness is much improved with the patient sitting upright.)  Heart: Normal rate.  Irregular rhythm.  (Prosthetic click.)  Extremities: There is dependent edema.  (Minimal/trace pretibial edema.  Improved from yesterday.)  Neurological: Patient is alert and oriented to person, place and time.    Skin:  Warm and dry.       Disposition:  Home    Patient Instructions:      Discharge Medications      New Medications      Instructions Start Date   enoxaparin 80 MG/0.8ML solution syringe  Commonly known as: LOVENOX   70 mg, Subcutaneous, Every 12 Hours Scheduled, Continue until INR is therapeutic         Changes to Medications      Instructions Start Date   digoxin 125 MCG tablet  Commonly known as: LANOXIN  What changed:   · how much to take  · when to take this   TAKE ONE TABLET BY MOUTH DAILY      warfarin 5 MG tablet  Commonly known as: COUMADIN  What changed:   · See the new instructions.  · Another medication with the same name was removed. Continue taking this medication, and follow the directions you see here.   7.5 mg, Oral, Nightly         Continue These Medications      Instructions Start Date   aspirin 81 MG EC tablet   81 mg, Oral, Daily      atorvastatin 40 MG tablet  Commonly known as: LIPITOR   40 mg, Oral, Daily      diphenhydrAMINE 25 mg capsule  Commonly known as: BENADRYL   25 mg, Oral, 2 Times Daily PRN      famotidine 20 MG tablet  Commonly known as: PEPCID   20 mg, Oral, 2 Times Daily Before Meals      ferrous gluconate 324 MG tablet  Commonly known as: FERGON   324 mg, Oral, Every Other Day      fish oil 1000 MG capsule capsule   4,000 mg, Oral, Daily With Breakfast      furosemide 40 MG tablet  Commonly known as: Lasix   40 mg, " Oral, Daily      insulin lispro 100 UNIT/ML injection  Commonly known as: humaLOG   USE AS DIRECTED WITH V-GO PUMP      lactobacillus acidophilus capsule capsule   1 capsule, Oral, Daily      magnesium oxide 400 MG tablet  Commonly known as: MAG-OX   400 mg, Oral, 2 Times Daily      metoprolol succinate XL 25 MG 24 hr tablet  Commonly known as: TOPROL-XL   12.5 mg, Oral, Daily      saccharomyces boulardii 250 MG capsule  Commonly known as: FLORASTOR   250 mg, Oral, 2 Times Daily      TRESIBA FLEXTOUCH SC   30-65 Units, Subcutaneous, Daily PRN, Use as directed if blood sugar gets too high       V-Go 40 kit   USE AS DIRECTED THREE TIMES A DAY      vancomycin 125 MG capsule  Commonly known as: VANCOCIN   125 mg, Oral, Every Other Day         Stop These Medications    colestipol 1 g tablet  Commonly known as: Colestid     polycarbophil 625 MG tablet tablet     sodium bicarbonate 650 MG tablet          Diet Instructions     Diet: Consistent Carbohydrate, Soft Texture; Thin Liquids, No Restrictions; Whole      Discharge Diet:  Consistent Carbohydrate  Soft Texture       Fluid Consistency: Thin Liquids, No Restrictions    Soft Options: Whole        Future Appointments   Date Time Provider Department Center   4/25/2022  8:15 AM Chery Copeland PA-C [facility]   8/3/2022  8:00 AM Griffin Fried MD [facility]   9/22/2022  8:00 AM Epi Barrera APRN [facility]  [facility]     Additional Instructions for the Follow-ups that You Need to Schedule     Ambulatory Referral to Home Health (Hospital)   As directed      Face to Face Visit Date: 4/4/2022    Follow-up provider for Plan of Care?: I treated the patient in an acute care facility and will not continue treatment after discharge.    Follow-up provider: EPI BARRERA [[id number]]    Reason/Clinical Findings: CHF, anticoagulation, postop debilitation,    Describe mobility limitations that make leaving home difficult: Please see above.    Nursing/Therapeutic Services  Requested: Skilled Nursing Physical Therapy    Skilled nursing orders: CHF management Cardiopulmonary assessments    PT orders: Therapeutic exercise Gait Training Transfer training Strengthening Home safety assessment    Weight Bearing Status: As Tolerated    Frequency: 1 Week 1         Discharge Follow-up with PCP   As directed       Currently Documented PCP:    Rubin Hinkle APRN    PCP Phone Number:    [phone]     Follow Up Details: 1 week         Discharge Follow-up with Specialty: General surgery.  Cardiology.   As directed      Specialty: General surgery.  Cardiology.         Protime-INR    Apr 06, 2022 (Approximate)      Then daily till INR therapeutic.    Order Comments: Then daily till INR therapeutic.     Release to patient: Immediate         Protime-INR    Apr 09, 2022 (Approximate)      Is Patient on anti-coag: Yes         Basic Metabolic Panel    Apr 11, 2022 (Approximate)      Release to patient: Immediate         CBC & Differential    Apr 11, 2022 (Approximate)      Manual Differential: No            Follow-up Information     Lisa Guidry MD Follow up in 2 week(s).    Specialty: General Surgery  Why: Call for appointment.  Contact information:  [address] [facility] [address]  [facility] [id number] [phone]             Rubin Hinkle APRN .    Specialties: Family Medicine, Urgent Care, Emergency Medicine  Why: 1 week  Contact information:  [address] [facility] [id number] [phone]                       Discharge Order (From admission, onward)    None            Total time spent discharging patient including evaluation,post hospitalization follow up,  medication and post hospitalization instructions and education total time exceeds 30 minutes.    Signed:  Robb Baldwin MD  4/4/2022  10:40 EDT    EMR Dragon/Transcription disclaimer:   Much of this encounter note is an electronic transcription/translation of spoken language to printed text. The electronic translation of spoken  language may permit erroneous, or at times, nonsensical words or phrases to be inadvertently transcribed; Although I have reviewed the note for such errors, some may still exist.       Electronically signed by Robb Baldwin MD at 04/04/22 1047       Discharge Order (From admission, onward)     Start     Ordered    04/04/22 1010  Discharge patient  Once        Expected Discharge Date: 04/04/22    Discharge Disposition: Home-Health Care [facility] [place]    Physician of Record for Attribution - Please select from Treatment Team: ROBB BALDWIN [[id number]]    Review needed by CMO to determine Physician of Record: No       Question Answer Comment   Physician of Record for Attribution - Please select from Treatment Team ROBB BALDWIN.    Review needed by CMO to determine Physician of Record No        04/04/22 6019

## 2022-04-04 NOTE — PROGRESS NOTES
Patient is discharging today . Patient is current with [facility] . Orders in Epic for resumption of care and INR to be checked at home on Tuesday 4/5/22 and called to [facility] / Dr. Griffin Fried [phone]. Thank you !

## 2022-04-04 NOTE — PAYOR COMM NOTE
"Francisco Xie (86 y.o. Male)     PLEASE SEE ATTACHED FOR INPT AUTH.     RF#[id number]    PLEASE CALL   OR  [phone]    THANK YOU    NAVDEEP LIEBERMAN LPN CCP            Date of Birth   1935    Social Security Number       Address   [address] [place] [address]    Home Phone       MRN   [id number]       Jew   Scientology    Marital Status                               Admission Date   3/23/22    Admission Type   Emergency    Admitting Provider   Louis Jackson MD    Attending Provider   Robb Baldwin MD    Department, Room/Bed   [address], [address]/1       Discharge Date       Discharge Disposition       Discharge Destination                               Attending Provider: Robb Baldwin MD    Allergies: Other, Penicillins, Percocet [Oxycodone-acetaminophen]    Isolation: Spore, Contact   Infection: C.difficile (02/15/22), MRSA (03/23/22)   Code Status: CPR   Advance Care Planning Activity    Ht: 182.9 cm (72\")   Wt: 71.1 kg (156 lb 12 oz)    Admission Cmt: None   Principal Problem: Calculus of gallbladder with acute cholecystitis without obstruction [K80.00]                 Active Insurance as of 3/23/2022     Primary Coverage     Payor Plan Insurance Group Employer/Plan Group    ANTHEM BLUE CROSS ANTHEM BLUE CROSS BLUE [facility] PPO [id number]     Payor Plan Address Payor Plan Phone Number Payor Plan Fax Number Effective Dates    [address] [phone]  1/1/2022 - None Entered    [facility] [id number]       Subscriber Name Subscriber Birth Date Member ID       PATRICIA XIE 7/11/1955 [id number]           Secondary Coverage     Payor Plan Insurance Group Employer/Plan Group    MEDICARE MEDICARE A & B      Payor Plan Address Payor Plan Phone Number Payor Plan Fax Number Effective Dates    [address] [phone]  11/1/2002 - None Entered    [facility] [id number]       Subscriber Name Subscriber Birth Date Member ID    "    LAURA XIE 1935 [id number]                 Emergency Contacts      (Rel.) Home Phone Work Phone Mobile Phone    Gladys Xie (Spouse) [phone] -- [phone]    Ricardo Bruce (Son) [phone] -- [phone]              Oxygen Therapy (last 2 days)     Date/Time SpO2 Device (Oxygen Therapy) Flow (L/min) Oxygen Concentration (%) ETCO2 (mmHg)    04/04/22 0835 100 -- -- -- --    04/04/22 0830 99 room air -- -- --    04/04/22 0748 100 room air -- -- --    04/04/22 0012 -- room air -- -- --    04/03/22 2300 100 -- -- -- --    04/03/22 2054 -- room air -- -- --    04/03/22 1938 99 room air -- -- --    04/03/22 1912 100 -- -- -- --    04/03/22 1909 98 room air -- -- --    04/03/22 1630 99 room air -- -- --    04/03/22 1356 99 room air -- -- --    04/03/22 1310 100 room air -- -- --    04/03/22 0927 -- room air -- -- --    04/03/22 0755 99 room air -- -- --    04/03/22 0725 100 room air -- -- --    04/03/22 0003 -- room air -- -- --    04/02/22 2335 97 room air -- -- --    04/02/22 2049 -- room air -- -- --    04/02/22 1950 97 room air -- -- --    04/02/22 1921 97 room air -- -- --    04/02/22 1715 98 room air -- -- --    04/02/22 1433 -- room air 2 -- --    04/02/22 1401 98 -- -- -- --    04/02/22 1326 99 room air -- -- --    04/02/22 1025 99 room air -- -- --    04/02/22 0952 -- room air -- -- --    04/02/22 0728 96 -- -- -- --    04/02/22 0018 -- room air -- -- --        Intake & Output (last 2 days)       04/02 0701 04/03 0700 04/03 0701 04/04 0700 04/04 0701 04/05 0700    P.O.  260     Total Intake(mL/kg)  260 (3.7)     Urine (mL/kg/hr)  725 (0.4)     Drains       Stool  0     Total Output  725     Net  -465            Urine Unmeasured Occurrence 3 x      Stool Unmeasured Occurrence 3 x 3 x 1 x        Lines, Drains & Airways     Active LDAs     Name Placement date Placement time Site Days    Peripheral IV 03/23/22 0831 Right Antecubital 03/23/22  0831  Antecubital  12     "Peripheral IV 22 0830 Posterior;Right Forearm 22  0830  Forearm  8    Peripheral  Left;Posterior Hand 22  1930  Hand  5    External Urinary Catheter 22  0230  --  less than 1          Inactive LDAs     Name Placement date Placement time Removal date Removal time Site Days    [REMOVED] Peripheral IV 22 Right Wrist 22  1541  Wrist  36    [REMOVED] Peripheral IV 22 Right Forearm 22  1541  Forearm  34    [REMOVED] Closed/Suction Drain 1 RLQ Bulb 19 Fr. 22  1800  RLQ  2    [REMOVED] ETT  22  184  created via procedure documentation  22  -- less than 1                     Physician Progress Notes (last 48 hours)      Robb Baldwin MD at 22 1005          DAILY PROGRESS NOTE  [facility]    Patient Identification:  Name: Francisco Sequeira  Age: 86 y.o.  Sex: male  :  1935  MRN: [id number]         Primary Care Physician: Rubin Hinkle APRN      Subjective  Patient with no acute complaints.  States he is feeling well.    Objective:  General Appearance:  Comfortable, well-appearing, in no acute distress and not in pain.    Vital signs: (most recent): Blood pressure 129/65, pulse 89, temperature 98.2 °F (36.8 °C), temperature source Oral, resp. rate 16, height 182.9 cm (72\"), weight 71.1 kg (156 lb 12 oz), SpO2 100 %.    Lungs:  Normal effort and normal respiratory rate.  He is not in respiratory distress.  No stridor.  There are decreased breath sounds.  No rales, wheezes or rhonchi.  (Bibasilar dullness.  The degree of dullness is much improved with the patient sitting upright.)  Heart: Normal rate.  Irregular rhythm.  (Prosthetic click.)  Extremities: There is dependent edema.  (Minimal/trace pretibial edema.  Improved from yesterday.)  Neurological: Patient is alert and oriented to person, place and time.    Skin:  Warm and dry.  "               Vital signs in last 24 hours:  Temp:  [97.8 °F (36.6 °C)-98.2 °F (36.8 °C)] 98.2 °F (36.8 °C)  Heart Rate:  [] 89  Resp:  [16-20] 16  BP: (129-146)/(65-72) 129/65    Intake/Output:    Intake/Output Summary (Last 24 hours) at 4/4/2022 1005  Last data filed at 4/4/2022 0510  Gross per 24 hour   Intake 260 ml   Output 725 ml   Net -465 ml         Results from last 7 days   Lab Units 04/04/22  0711 04/03/22  0617 04/02/22  0456 04/01/22  0502 03/31/22  0452 03/30/22  1625 03/30/22  0559   WBC 10*3/mm3 6.36 5.59 7.92 6.87 11.59* 3.13* 7.60   HEMOGLOBIN g/dL 9.1* 9.1* 9.0* 9.0* 9.1* 9.8* 9.4*   PLATELETS 10*3/mm3 201 184 193 158 170 134* 172     Results from last 7 days   Lab Units 04/04/22  0711 04/03/22  0617 04/02/22 0456 04/01/22  0502 03/31/22  0452 03/30/22  1625 03/30/22  0559   SODIUM mmol/L 139 139 143 142 142 141 142   POTASSIUM mmol/L 4.3 4.3 3.8 3.6 3.9 4.0 3.9   CHLORIDE mmol/L 107 108* 112* 112* 111* 109* 110*   CO2 mmol/L 26.7 26.0 26.0 23.9 22.9 21.0* 21.2*   BUN mg/dL 10 12 17 22 24* 20 16   CREATININE mg/dL 0.96 0.99 1.00 1.25 1.53* 1.36* 1.07   GLUCOSE mg/dL 143* 147* 75 77 144* 159* 159*   Estimated Creatinine Clearance: 57.6 mL/min (by C-G formula based on SCr of 0.96 mg/dL).  Results from last 7 days   Lab Units 04/04/22  0711 04/03/22  0617 04/02/22  0456 04/01/22  0502 03/31/22  0452 03/30/22  1625 03/30/22  0559 03/29/22  0510   CALCIUM mg/dL 8.2* 7.7* 7.5* 7.4* 7.5* 7.4* 7.5* 7.7*   ALBUMIN g/dL  --  2.20* 2.40* 1.90* 2.10*  --  2.10* 2.30*     Results from last 7 days   Lab Units 04/03/22  0617 04/02/22  0456 04/01/22  0502 03/31/22  0452 03/30/22  0559 03/29/22  0510   ALBUMIN g/dL 2.20* 2.40* 1.90* 2.10* 2.10* 2.30*   BILIRUBIN mg/dL 0.3 0.3 0.2 0.3 0.4 0.5   ALK PHOS U/L 160* 158* 151* 140* 146* 144*   AST (SGOT) U/L 18 21 30 35 31 15   ALT (SGPT) U/L 13 11 12 12 10 8       Assessment:  Acute cholecystitis: Status post laparoscopic cholecystectomy 3/29/2022.   Postop  hemoglobin holding steady.  BYRON/dehydration: Resolved.  Atrial fibrillation: Cardiology input appreciated. Presently with good rate control.  On Lovenox for anticoagulation.  Transitioning to oral Coumadin.  Mechanical aortic valve: See above.  Aspiration pneumonia: Infectious disease input appreciated.  Treatment completed.  Right pleural effusion: Status post thoracentesis with 1700 cc of serosanguineous fluid removed 3/30/2022.   Previous thoracenteses on 2021 and 10/25/2021.   Yesterday's chest x-ray shows slight increase in effusion from his previous x-ray but much improved from admitting x-ray.  Lasix has been resumed.  History of C. difficile colitis: Infectious disease input appreciated.  Continue oral vancomycin.  Clinically no evidence of recurrence.  :  Lab error   History nephrectomy/chronic kidney disease:  Diabetes type 2:   Presently with good control with his home insulin pump back in place.    Severe pulmonary hypertension: Last RV systolic pressure on echocardiogram estimated at 63 mmHg.    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  Probable vasovagal episode resolved.  Anemia: Acute on chronic.    Acute postop anemia-expected.  Iron deficiency anemia noted on iron panel today.  Will initiate oral iron replacement.  Anemia chronic disease.  Postop hemoglobin now holding steady.  Pretib edema: Multifactorial including pulmonary hypertension, hypoalbuminemia.    Much improved today.  P.o. Lasix resumed.      Plan:  Please see above.  Okay for discharge from my point of view.  Discussed with wife at bedside.  Discussed with CCP.    Robb Baldwin MD  2022  10:05 EDT      Electronically signed by Robb Baldwin MD at 22 1009     Robb Baldwin MD at 22 1316          DAILY PROGRESS NOTE  [facility]    Patient Identification:  Name: Francisco Sequeira  Age: 86 y.o.  Sex: male  :   "1935  MRN: [id number]         Primary Care Physician: Rubin Hinkle APRN      Subjective  Patient with no acute complaints.  States he continues to feel better.  He has been up and ambulating with the nursing staff today.    Objective:  General Appearance:  Comfortable, well-appearing, in no acute distress and not in pain.    Vital signs: (most recent): Blood pressure 178/98, pulse 76, temperature 97.2 °F (36.2 °C), temperature source Oral, resp. rate 20, height 182.9 cm (72\"), weight 71.7 kg (158 lb 1.1 oz), SpO2 100 %.    Lungs:  Normal effort and normal respiratory rate.  He is not in respiratory distress.  No stridor.  There are decreased breath sounds.  No rales, wheezes or rhonchi.  (Developing basilar dullness again, predominantly right base.)  Heart: Normal rate.  Irregular rhythm.    Extremities: There is dependent edema.  (1+ pretibial pitting edema right lower extremity, 2+ left lower extremity.)  Skin:  Warm and dry.  (Chronic stasis dermatitis changes in the shins bilaterally.)              Vital signs in last 24 hours:  Temp:  [97.2 °F (36.2 °C)-98 °F (36.7 °C)] 97.2 °F (36.2 °C)  Heart Rate:  [76-95] 76  Resp:  [16-20] 20  BP: (128-178)/(68-98) 178/98    Intake/Output:    Intake/Output Summary (Last 24 hours) at 4/3/2022 1316  Last data filed at 4/3/2022 1141  Gross per 24 hour   Intake 120 ml   Output --   Net 120 ml         Results from last 7 days   Lab Units 04/03/22  0617 04/02/22  0456 04/01/22  0502 03/31/22  0452 03/30/22  1625 03/30/22  0559 03/29/22  0510   WBC 10*3/mm3 5.59 7.92 6.87 11.59* 3.13* 7.60 6.47   HEMOGLOBIN g/dL 9.1* 9.0* 9.0* 9.1* 9.8* 9.4* 9.5*   PLATELETS 10*3/mm3 184 193 158 170 134* 172 170     Results from last 7 days   Lab Units 04/03/22  0617 04/02/22  0456 04/01/22  0502 03/31/22  0452 03/30/22  1625 03/30/22  0559 03/29/22  0510   SODIUM mmol/L 139 143 142 142 141 142 144   POTASSIUM mmol/L 4.3 3.8 3.6 3.9 4.0 3.9 3.6   CHLORIDE mmol/L 108* 112* 112* 111* 109* " 110* 110*   CO2 mmol/L 26.0 26.0 23.9 22.9 21.0* 21.2* 23.0   BUN mg/dL 12 17 22 24* 20 16 13   CREATININE mg/dL 0.99 1.00 1.25 1.53* 1.36* 1.07 1.02   GLUCOSE mg/dL 147* 75 77 144* 159* 159* 117*   Estimated Creatinine Clearance: 56.3 mL/min (by C-G formula based on SCr of 0.99 mg/dL).  Results from last 7 days   Lab Units 04/03/22  0617 04/02/22  0456 04/01/22  0502 03/31/22  0452 03/30/22  1625 03/30/22  0559 03/29/22  0510 03/28/22  0541   CALCIUM mg/dL 7.7* 7.5* 7.4* 7.5* 7.4* 7.5* 7.7* 7.6*   ALBUMIN g/dL 2.20* 2.40* 1.90* 2.10*  --  2.10* 2.30* 2.10*     Results from last 7 days   Lab Units 04/03/22  0617 04/02/22  0456 04/01/22  0502 03/31/22  0452 03/30/22  0559 03/29/22  0510 03/28/22  0541   ALBUMIN g/dL 2.20* 2.40* 1.90* 2.10* 2.10* 2.30* 2.10*   BILIRUBIN mg/dL 0.3 0.3 0.2 0.3 0.4 0.5 0.5   ALK PHOS U/L 160* 158* 151* 140* 146* 144* 125*   AST (SGOT) U/L 18 21 30 35 31 15 14   ALT (SGPT) U/L 13 11 12 12 10 8 7       Assessment:  Acute cholecystitis: Status post laparoscopic cholecystectomy 3/29/2022.   Postop hemoglobin holding steady.  BYRON/dehydration: Resolved.  Atrial fibrillation: Cardiology input appreciated. Presently with good rate control.  On Lovenox for anticoagulation.  Transitioning to oral Coumadin.  Mechanical aortic valve: See above.  Aspiration pneumonia: Infectious disease input appreciated.  Treatment completed.  Right pleural effusion: Status post thoracentesis with 1700 cc of serosanguineous fluid removed 3/30/2022.   Previous thoracenteses on 11/11/2021 and 10/25/2021.  Appears to be developing basilar dullness again.  Will have chest x-ray rechecked.  History of C. difficile colitis: Infectious disease input appreciated.  Continue oral vancomycin.  Clinically no evidence of recurrence.  :  Lab error   History nephrectomy/chronic kidney disease:  Diabetes type 2:   Presently with good control with his home insulin pump back in place.    Severe pulmonary hypertension: Last RV  systolic pressure on echocardiogram estimated at 63 mmHg.    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  Probable vasovagal episode resolved.  Anemia: Acute on chronic.    Acute postop anemia-expected.  Iron deficiency anemia noted on iron panel today.  Will initiate oral iron replacement.  Anemia chronic disease.  Pretib edema: Multifactorial including pulmonary hypertension, hypoalbuminemia.  Resume p.o. Lasix.         Plan:  Please see above.  Discharge planning.    Robb Baldwin MD  4/3/2022  13:16 EDT      Electronically signed by Robb Baldwin MD at 04/03/22 1323     Stevo Vilchis MD at 04/03/22 1221          Postop day 5 status post lap teja IOC    No events overnight, tolerating diet, no nausea no vomiting    O:   Vitals:    04/03/22 0540 04/03/22 0725 04/03/22 0737 04/03/22 0755   BP:    178/98   BP Location:    Right arm   Patient Position:    Lying   Pulse:  87 85 93   Resp:  20 20 20   Temp:    97.2 °F (36.2 °C)   TempSrc:    Oral   SpO2:  100%  99%   Weight: 71.7 kg (158 lb 1.1 oz)      Height:         Alert, no acute distress  Abdomen soft, mildly tender to palpation over incisions, nondistended, incisions clean dry and intact    Mildly elevated alk phos, will need to have repeat in 2 weeks  Hemoglobin 9.1, stable    Plan:   -  Restart Coumadin  - Okay to discharge anytime from surgical standpoint when patient has an anticoagulation plan  -Follow-up with Dr. Guidry in 2 weeks, will need to have repeat LFTs  -We will see as needed    Electronically signed by Stevo Vilchis MD at 04/03/22 1222     Stevo Vilchis MD at 04/02/22 1453          Postop day 4 status post lap teja IOC    S: No events overnight, feeling much better.  Minimal abdominal pain.  No nausea or vomiting, started on Lovenox yesterday    O:   Vitals:    04/02/22 1025 04/02/22 1034 04/02/22 1326 04/02/22 1401   BP:    150/68   BP  "Location:    Left arm   Patient Position:    Lying   Pulse: 89 90 82 88   Resp: 20 20 20 20   Temp:    97.7 °F (36.5 °C)   TempSrc:    Oral   SpO2: 99%  99% 98%   Weight:       Height:         Alert, no acute distress  Breathing comfortable  Regular rate rhythm  Abdomen soft, nontender nontender, incisions clean dry and intact, right lower quadrant dressing from prior drain clean dry and intact    Labs reviewed, slightly elevated alk phos  Hemoglobin stable at 9.    Assessment and plan    Postop day 4 status post lap teja IOC, tolerating diet, pain well controlled, having bowel function  Hemoglobin has been stable  Okay to restart warfarin anytime  Can be discharged anytime tomorrow from surgical standpoint.  Follow-up with Dr. Guidry in 2 weeks      Electronically signed by Stevo Vilchis MD at 22 1455     Robb Baldwin MD at 22 1436          DAILY PROGRESS NOTE  [facility]    Patient Identification:  Name: Francisco Sequeira  Age: 86 y.o.  Sex: male  :  1935  MRN: [id number]         Primary Care Physician: Rubin Hinkle APRN      Subjective  Patient with no acute complaints.  Overall feeling well.  Tolerating oral intake well.  Good BMs.    Objective:  General Appearance:  Comfortable, well-appearing, in no acute distress and not in pain.    Vital signs: (most recent): Blood pressure 150/68, pulse 88, temperature 97.7 °F (36.5 °C), temperature source Oral, resp. rate 20, height 182.9 cm (72\"), weight 72.1 kg (158 lb 15.2 oz), SpO2 98 %.    Lungs:  Normal effort and normal respiratory rate.  Breath sounds clear to auscultation.    Heart: Normal rate.  Irregular rhythm.    Abdomen: Abdomen is soft.  Bowel sounds are normal.   There is no abdominal tenderness.     Extremities: There is no dependent edema.    Neurological: Patient is alert and oriented to person, place and time.    Skin:  Warm and dry.                Vital signs in last 24 hours:  Temp:  [97.3 °F " (36.3 °C)-97.7 °F (36.5 °C)] 97.7 °F (36.5 °C)  Heart Rate:  [70-90] 88  Resp:  [17-20] 20  BP: (126-150)/(67-81) 150/68    Intake/Output:    Intake/Output Summary (Last 24 hours) at 4/2/2022 1436  Last data filed at 4/2/2022 0126  Gross per 24 hour   Intake 200 ml   Output 470 ml   Net -270 ml         Results from last 7 days   Lab Units 04/02/22 0456 04/01/22  0502 03/31/22  0452 03/30/22  1625 03/30/22  0559 03/29/22  0510 03/28/22  0541   WBC 10*3/mm3 7.92 6.87 11.59* 3.13* 7.60 6.47 6.49   HEMOGLOBIN g/dL 9.0* 9.0* 9.1* 9.8* 9.4* 9.5* 9.6*   PLATELETS 10*3/mm3 193 158 170 134* 172 170 158     Results from last 7 days   Lab Units 04/02/22  0456 04/01/22  0502 03/31/22  0452 03/30/22 1625 03/30/22  0559 03/29/22  0510 03/28/22  0541   SODIUM mmol/L 143 142 142 141 142 144 143   POTASSIUM mmol/L 3.8 3.6 3.9 4.0 3.9 3.6 3.6   CHLORIDE mmol/L 112* 112* 111* 109* 110* 110* 109*   CO2 mmol/L 26.0 23.9 22.9 21.0* 21.2* 23.0 21.4*   BUN mg/dL 17 22 24* 20 16 13 16   CREATININE mg/dL 1.00 1.25 1.53* 1.36* 1.07 1.02 1.09   GLUCOSE mg/dL 75 77 144* 159* 159* 117* 106*   Estimated Creatinine Clearance: 56.1 mL/min (by C-G formula based on SCr of 1 mg/dL).  Results from last 7 days   Lab Units 04/02/22 0456 04/01/22  0502 03/31/22  0452 03/30/22 1625 03/30/22  0559 03/29/22  0510 03/28/22  0541 03/27/22  0450   CALCIUM mg/dL 7.5* 7.4* 7.5* 7.4* 7.5* 7.7* 7.6* 7.6*   ALBUMIN g/dL 2.40* 1.90* 2.10*  --  2.10* 2.30* 2.10* 2.40*     Results from last 7 days   Lab Units 04/02/22  0456 04/01/22  0502 03/31/22  0452 03/30/22  0559 03/29/22  0510 03/28/22  0541 03/27/22  0450   ALBUMIN g/dL 2.40* 1.90* 2.10* 2.10* 2.30* 2.10* 2.40*   BILIRUBIN mg/dL 0.3 0.2 0.3 0.4 0.5 0.5 0.6   ALK PHOS U/L 158* 151* 140* 146* 144* 125* 128*   AST (SGOT) U/L 21 30 35 31 15 14 14   ALT (SGPT) U/L 11 12 12 10 8 7 10       Assessment:  Acute cholecystitis: Status post laparoscopic cholecystectomy 3/29/2022.   Postop hemoglobin holding  steady.  BYRON/dehydration: Resolved.  Atrial fibrillation: Cardiology input appreciated. Presently with good rate control.  On Lovenox for anticoagulation.  Appears to be tolerating this well.  Mechanical aortic valve: See above.  Aspiration pneumonia: Infectious disease input appreciated.  Treatment completed.  Right pleural effusion: Status post thoracentesis with 1700 cc of serosanguineous fluid removed 3/30/2022.   Previous thoracenteses on 11/11/2021 and 10/25/2021.  History of C. difficile colitis: Infectious disease input appreciated.  Continue oral vancomycin.  :  Lab error   History nephrectomy/chronic kidney disease:  Diabetes type 2:   Presently with good control with his home insulin pump back in place.    Severe pulmonary hypertension: Last RV systolic pressure on echocardiogram estimated at 63 mmHg.    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  Probable vasovagal episode resolved.  Anemia: Acute on chronic.  Postop anemia expected.  He does tend towards a microcytosis and has had low iron levels in the past.  I will have this rechecked in the morning.      Plan:  Please see above.  Discussed with patient and wife at bedside.  Resume oral anticoagulation when okay with cardiology and surgery.  Discharge planning.    Robb Baldwin MD  4/2/2022  14:36 EDT      Electronically signed by Robb Baldwin MD at 04/02/22 6706

## 2022-04-04 NOTE — PROGRESS NOTES
[facility] Clinical Pharmacy Services: Warfarin Dosing/Monitoring Consult    Francisco Sequeira is a 86 y.o. male, estimated creatinine clearance is 57.6 mL/min (by C-G formula based on SCr of 0.96 mg/dL). weighing 71.1 kg (156 lb 12 oz).    Results from last 7 days   Lab Units 04/04/22  0711 04/03/22  0617 04/02/22  0456 04/01/22  0502 03/31/22  0452   INR  1.10 1.10 1.14* 1.28* 1.35*   HEMOGLOBIN g/dL 9.1* 9.1* 9.0* 9.0* 9.1*   HEMATOCRIT % 28.7* 30.2* 29.2* 28.5* 29.4*   PLATELETS 10*3/mm3 201 184 193 158 170     Prior to admission anticoagulation: 7.5 mg every Mon, Wed, Fri; 5 mg all other days    Hospital Anticoagulation:  Consulting provider: Stevo Vilchis  Start date: 4/3  Indication: mechanical valve  Target INR:  3-3.5 per anticoag clinic as well as pt spouse  Expected duration: Indefinite   Bridge Therapy: Yes  with enoxaparin 70 mg (1 mg/kg) q12h    Potential food or drug interactions: none    Education complete?/Date: Yes; PTA    Assessment/Plan:  Day 2 of warfarin restart. INR 1.10 today. Will give warfarin 10 mg load again today and then expect to resume pt's home regimen tomorrow depending on INR trend. If pt is discharged before INR is therapeutic, would consider lovenox bridge as pt has a CVA Hx. Pt is followed by the anticoag clinic at [facility], would recommend close follow up at discharge.   Monitor for any signs or symptoms of bleeding  Follow up daily INRs and dose adjustments    Pharmacy will continue to follow until discharge or discontinuation of warfarin.     Keyanna Saenz, PharmD  Clinical Pharmacist

## 2022-04-04 NOTE — DISCHARGE SUMMARY
PHYSICIAN DISCHARGE SUMMARY                                                                        [facility]    Patient Identification:  Name: Francisco Sequeira  Age: 86 y.o.  Sex: male  :  1935  MRN: [id number]  Primary Care Physician: Rubin Hinkle APRN    Admit date: 3/23/2022  Discharge date and time: 2022     Discharged Condition: good    Discharge Diagnoses:   Acute cholecystitis: Status post laparoscopic cholecystectomy 3/29/2022.     BYRON/dehydration: Resolved.  Atrial fibrillation:  On Lovenox for anticoagulation.  Transitioning to oral Coumadin.  Mechanical aortic valve: See above.  Aspiration pneumonia:   Right pleural effusion: Status post thoracentesis with 1700 cc of serosanguineous fluid removed 3/30/2022.     History of C. difficile colitis:  History nephrectomy/chronic kidney disease:  Diabetes type 2:       Severe pulmonary hypertension: Last RV systolic pressure on echocardiogram estimated at 63 mmHg.    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  Pretib edema: .      Hospital Course:  Very pleasant 86-year-old gentleman with multiple medical issues including atrial fibrillation and mechanical prosthetic aortic valve presenting with acute cholecystitis as well as aspiration ammonia.  Please H&P for full details.  Aspiration pneumonia was treated via pulmonology.     Coumadin was put on hold and INR corrected to a safe range.  Once cleared for surgery did undergo successful laparoscopic cholecystectomy.  He does have a history of severe pulmonary hypertension recurrent pleural effusions.  Postop there was issue with recurrence of the pleural effusion again and he did undergo thoracentesis with 1700 cc removed.  Once cleared by surgery anticoagulation was resumed in the form of full dose Lovenox.  He is tolerating this well and Coumadin is now  "being resumed and he will need to transition of the tube until his INR is therapeutic.  At this point he is now afebrile vital signs stable and can be discharge remainder his treatment follow-up as an outpatient.  Arrangements for home health for both nursing services and physical therapy are being arranged.  Also arranged for daily PT/INRs until his INR is therapeutic.  This was discussed with CCP and initially orders were put into epic however there are issues with epic blocking the discharge with this.  It had to be modified with the help of technical support.  Thankfully his wife is also very familiar with managing of his anticoagulation.  She is administered Lovenox in the past and also helps manage his diabetes.      Consults:     Consults     Date and Time Order Name Status Description    3/24/2022  9:21 AM Inpatient Infectious Diseases Consult Completed     3/24/2022  9:21 AM Inpatient Pulmonology Consult Completed     3/23/2022  1:38 PM Inpatient Cardiology Consult Completed     3/23/2022  1:32 PM Inpatient General Surgery Consult Completed     3/23/2022 10:39 AM Surgery (on-call MD unless specified) Completed     3/23/2022 10:39 AM [facility] (on-call MD unless specified) Details      2/23/2022  1:27 PM Inpatient Infectious Diseases Consult Completed     2/17/2022 12:19 PM Inpatient Cardiology Consult Completed     2/15/2022 10:06 AM Inpatient Gastroenterology Consult Completed             Discharge Exam:  General Appearance:  Comfortable, well-appearing, in no acute distress and not in pain.    Vital signs: (most recent): Blood pressure 129/65, pulse 89, temperature 98.2 °F (36.8 °C), temperature source Oral, resp. rate 16, height 182.9 cm (72\"), weight 71.1 kg (156 lb 12 oz), SpO2 100 %.    Lungs:  Normal effort and normal respiratory rate.  He is not in respiratory distress.  No stridor.  There are decreased breath sounds.  No rales, wheezes or rhonchi.  (Bibasilar dullness.  The degree of dullness is much " improved with the patient sitting upright.)  Heart: Normal rate.  Irregular rhythm.  (Prosthetic click.)  Extremities: There is dependent edema.  (Minimal/trace pretibial edema.  Improved from yesterday.)  Neurological: Patient is alert and oriented to person, place and time.    Skin:  Warm and dry.       Disposition:  Home    Patient Instructions:      Discharge Medications      New Medications      Instructions Start Date   enoxaparin 80 MG/0.8ML solution syringe  Commonly known as: LOVENOX   70 mg, Subcutaneous, Every 12 Hours Scheduled, Continue until INR is therapeutic         Changes to Medications      Instructions Start Date   digoxin 125 MCG tablet  Commonly known as: LANOXIN  What changed:   · how much to take  · when to take this   TAKE ONE TABLET BY MOUTH DAILY      warfarin 5 MG tablet  Commonly known as: COUMADIN  What changed:   · See the new instructions.  · Another medication with the same name was removed. Continue taking this medication, and follow the directions you see here.   7.5 mg, Oral, Nightly         Continue These Medications      Instructions Start Date   aspirin 81 MG EC tablet   81 mg, Oral, Daily      atorvastatin 40 MG tablet  Commonly known as: LIPITOR   40 mg, Oral, Daily      diphenhydrAMINE 25 mg capsule  Commonly known as: BENADRYL   25 mg, Oral, 2 Times Daily PRN      famotidine 20 MG tablet  Commonly known as: PEPCID   20 mg, Oral, 2 Times Daily Before Meals      ferrous gluconate 324 MG tablet  Commonly known as: FERGON   324 mg, Oral, Every Other Day      fish oil 1000 MG capsule capsule   4,000 mg, Oral, Daily With Breakfast      furosemide 40 MG tablet  Commonly known as: Lasix   40 mg, Oral, Daily      insulin lispro 100 UNIT/ML injection  Commonly known as: humaLOG   USE AS DIRECTED WITH V-GO PUMP      lactobacillus acidophilus capsule capsule   1 capsule, Oral, Daily      magnesium oxide 400 MG tablet  Commonly known as: MAG-OX   400 mg, Oral, 2 Times Daily       metoprolol succinate XL 25 MG 24 hr tablet  Commonly known as: TOPROL-XL   12.5 mg, Oral, Daily      saccharomyces boulardii 250 MG capsule  Commonly known as: FLORASTOR   250 mg, Oral, 2 Times Daily      TRESIBA FLEXTOUCH SC   30-65 Units, Subcutaneous, Daily PRN, Use as directed if blood sugar gets too high       V-Go 40 kit   USE AS DIRECTED THREE TIMES A DAY      vancomycin 125 MG capsule  Commonly known as: VANCOCIN   125 mg, Oral, Every Other Day         Stop These Medications    colestipol 1 g tablet  Commonly known as: Colestid     polycarbophil 625 MG tablet tablet     sodium bicarbonate 650 MG tablet          Diet Instructions     Diet: Consistent Carbohydrate, Soft Texture; Thin Liquids, No Restrictions; Whole      Discharge Diet:  Consistent Carbohydrate  Soft Texture       Fluid Consistency: Thin Liquids, No Restrictions    Soft Options: Whole        Future Appointments   Date Time Provider Department Center   4/25/2022  8:15 AM Chery Copeland PA-C [facility]   8/3/2022  8:00 AM Griffin Fried MD [facility]   9/22/2022  8:00 AM Epi Barrera APRN [facility]     Additional Instructions for the Follow-ups that You Need to Schedule     Ambulatory Referral to Home Health (Hospital)   As directed      Face to Face Visit Date: 4/4/2022    Follow-up provider for Plan of Care?: I treated the patient in an acute care facility and will not continue treatment after discharge.    Follow-up provider: EPI BARRERA [[id number]]    Reason/Clinical Findings: CHF, anticoagulation, postop debilitation,    Describe mobility limitations that make leaving home difficult: Please see above.    Nursing/Therapeutic Services Requested: Skilled Nursing Physical Therapy    Skilled nursing orders: CHF management Cardiopulmonary assessments    PT orders: Therapeutic exercise Gait Training Transfer training Strengthening Home safety assessment    Weight Bearing Status: As Tolerated    Frequency: 1 Week 1          Discharge Follow-up with PCP   As directed       Currently Documented PCP:    Rubin Hinkle APRN    PCP Phone Number:    [phone]     Follow Up Details: 1 week         Discharge Follow-up with Specialty: General surgery.  Cardiology.   As directed      Specialty: General surgery.  Cardiology.         Protime-INR    Apr 06, 2022 (Approximate)      Then daily till INR therapeutic.    Order Comments: Then daily till INR therapeutic.     Release to patient: Immediate         Protime-INR    Apr 09, 2022 (Approximate)      Is Patient on anti-coag: Yes         Basic Metabolic Panel    Apr 11, 2022 (Approximate)      Release to patient: Immediate         CBC & Differential    Apr 11, 2022 (Approximate)      Manual Differential: No            Follow-up Information     Lisa Guidry MD Follow up in 2 week(s).    Specialty: General Surgery  Why: Call for appointment.  Contact information:  [address] [facility] [address]  [facility] [id number] [phone]             Rubin Hinkle APRN .    Specialties: Family Medicine, Urgent Care, Emergency Medicine  Why: 1 week  Contact information:  [address] [facility] [facility] [id number] [phone]                       Discharge Order (From admission, onward)    None            Total time spent discharging patient including evaluation,post hospitalization follow up,  medication and post hospitalization instructions and education total time exceeds 30 minutes.    Signed:  Robb Baldwin MD  4/4/2022  10:40 EDT    EMR Dragon/Transcription disclaimer:   Much of this encounter note is an electronic transcription/translation of spoken language to printed text. The electronic translation of spoken language may permit erroneous, or at times, nonsensical words or phrases to be inadvertently transcribed; Although I have reviewed the note for such errors, some may still exist.

## 2022-04-05 ENCOUNTER — APPOINTMENT (OUTPATIENT)
Dept: GENERAL RADIOLOGY | Facility: HOSPITAL | Age: 87
End: 2022-04-05

## 2022-04-05 ENCOUNTER — ANTICOAGULATION VISIT (OUTPATIENT)
Dept: PHARMACY | Facility: HOSPITAL | Age: 87
End: 2022-04-05

## 2022-04-05 ENCOUNTER — APPOINTMENT (OUTPATIENT)
Dept: CARDIOLOGY | Facility: HOSPITAL | Age: 87
End: 2022-04-05

## 2022-04-05 ENCOUNTER — HOSPITAL ENCOUNTER (INPATIENT)
Facility: HOSPITAL | Age: 87
LOS: 14 days | Discharge: NURSING, REHAB OR LONG-TERM CARE | End: 2022-04-20
Attending: EMERGENCY MEDICINE | Admitting: HOSPITALIST

## 2022-04-05 ENCOUNTER — TELEPHONE (OUTPATIENT)
Dept: SURGERY | Facility: CLINIC | Age: 87
End: 2022-04-05

## 2022-04-05 ENCOUNTER — HOME CARE VISIT (OUTPATIENT)
Dept: HOME HEALTH SERVICES | Facility: HOME HEALTHCARE | Age: 87
End: 2022-04-05

## 2022-04-05 ENCOUNTER — TRANSITIONAL CARE MANAGEMENT TELEPHONE ENCOUNTER (OUTPATIENT)
Dept: CALL CENTER | Facility: HOSPITAL | Age: 87
End: 2022-04-05

## 2022-04-05 VITALS
TEMPERATURE: 98.7 F | HEART RATE: 68 BPM | DIASTOLIC BLOOD PRESSURE: 64 MMHG | SYSTOLIC BLOOD PRESSURE: 138 MMHG | OXYGEN SATURATION: 99 % | RESPIRATION RATE: 18 BRPM

## 2022-04-05 DIAGNOSIS — Z95.2 H/O HEART VALVE REPLACEMENT WITH MECHANICAL VALVE: Primary | ICD-10-CM

## 2022-04-05 DIAGNOSIS — Z79.01 CHRONIC ANTICOAGULATION: ICD-10-CM

## 2022-04-05 DIAGNOSIS — M62.3 IMMOBILITY SYNDROME: ICD-10-CM

## 2022-04-05 DIAGNOSIS — I63.411 CEREBROVASCULAR ACCIDENT (CVA) DUE TO EMBOLISM OF RIGHT MIDDLE CEREBRAL ARTERY: ICD-10-CM

## 2022-04-05 DIAGNOSIS — Z95.2 H/O HEART VALVE REPLACEMENT WITH MECHANICAL VALVE: ICD-10-CM

## 2022-04-05 DIAGNOSIS — D64.9 ANEMIA, UNSPECIFIED TYPE: ICD-10-CM

## 2022-04-05 DIAGNOSIS — S70.12XA HEMATOMA OF ILIOPSOAS MUSCLE, LEFT, INITIAL ENCOUNTER: Primary | ICD-10-CM

## 2022-04-05 DIAGNOSIS — Z86.79 HISTORY OF ATRIAL FIBRILLATION: ICD-10-CM

## 2022-04-05 LAB
BASOPHILS # BLD AUTO: 0.02 10*3/MM3 (ref 0–0.2)
BASOPHILS NFR BLD AUTO: 0.3 % (ref 0–1.5)
DEPRECATED RDW RBC AUTO: 51.1 FL (ref 37–54)
EOSINOPHIL # BLD AUTO: 0.05 10*3/MM3 (ref 0–0.4)
EOSINOPHIL NFR BLD AUTO: 0.7 % (ref 0.3–6.2)
ERYTHROCYTE [DISTWIDTH] IN BLOOD BY AUTOMATED COUNT: 17 % (ref 12.3–15.4)
HCT VFR BLD AUTO: 27.2 % (ref 37.5–51)
HGB BLD-MCNC: 8.6 G/DL (ref 13–17.7)
IMM GRANULOCYTES # BLD AUTO: 0.03 10*3/MM3 (ref 0–0.05)
IMM GRANULOCYTES NFR BLD AUTO: 0.4 % (ref 0–0.5)
INR PPP: 1.2 (ref 0.9–1.1)
INR PPP: 1.3
LYMPHOCYTES # BLD AUTO: 0.71 10*3/MM3 (ref 0.7–3.1)
LYMPHOCYTES NFR BLD AUTO: 9.5 % (ref 19.6–45.3)
MCH RBC QN AUTO: 26.1 PG (ref 26.6–33)
MCHC RBC AUTO-ENTMCNC: 31.6 G/DL (ref 31.5–35.7)
MCV RBC AUTO: 82.7 FL (ref 79–97)
MONOCYTES # BLD AUTO: 0.45 10*3/MM3 (ref 0.1–0.9)
MONOCYTES NFR BLD AUTO: 6 % (ref 5–12)
NEUTROPHILS NFR BLD AUTO: 6.25 10*3/MM3 (ref 1.7–7)
NEUTROPHILS NFR BLD AUTO: 83.1 % (ref 42.7–76)
NRBC BLD AUTO-RTO: 0 /100 WBC (ref 0–0.2)
PLATELET # BLD AUTO: 242 10*3/MM3 (ref 140–450)
PMV BLD AUTO: 12.4 FL (ref 6–12)
PROTHROMBIN TIME: 15.1 SECONDS (ref 11.7–14.2)
RBC # BLD AUTO: 3.29 10*6/MM3 (ref 4.14–5.8)
WBC NRBC COR # BLD: 7.51 10*3/MM3 (ref 3.4–10.8)

## 2022-04-05 PROCEDURE — 85610 PROTHROMBIN TIME: CPT | Performed by: EMERGENCY MEDICINE

## 2022-04-05 PROCEDURE — 73560 X-RAY EXAM OF KNEE 1 OR 2: CPT

## 2022-04-05 PROCEDURE — 93005 ELECTROCARDIOGRAM TRACING: CPT | Performed by: EMERGENCY MEDICINE

## 2022-04-05 PROCEDURE — 93010 ELECTROCARDIOGRAM REPORT: CPT | Performed by: INTERNAL MEDICINE

## 2022-04-05 PROCEDURE — 85025 COMPLETE CBC W/AUTO DIFF WBC: CPT | Performed by: EMERGENCY MEDICINE

## 2022-04-05 PROCEDURE — 99285 EMERGENCY DEPT VISIT HI MDM: CPT

## 2022-04-05 PROCEDURE — G0299 HHS/HOSPICE OF RN EA 15 MIN: HCPCS

## 2022-04-05 PROCEDURE — 93971 EXTREMITY STUDY: CPT

## 2022-04-05 RX ORDER — SODIUM CHLORIDE 0.9 % (FLUSH) 0.9 %
10 SYRINGE (ML) INJECTION AS NEEDED
Status: DISCONTINUED | OUTPATIENT
Start: 2022-04-05 | End: 2022-04-20 | Stop reason: HOSPADM

## 2022-04-05 NOTE — OUTREACH NOTE
Call Center TCM Note    Flowsheet Row Responses   [facility] patient discharged from? [place]   Does the patient have one of the following disease processes/diagnoses(primary or secondary)? General Surgery   TCM attempt successful? Yes   Call start time 1122   Call end time 1128   Discharge diagnosis Laparoscopic cholecystectomy with cholangiogram, possible open   Person spoke with today (if not patient) and relationship gely Graham   Medication alerts for this patient LOVENOX-WARFARIN, wife has drawn INR today which resulted 1.3 and received new orders for Warfarin dosing   Meds reviewed with patient/caregiver? Yes   Is the patient having any side effects they believe may be caused by any medication additions or changes? No   Does the patient have all medications related to this admission filled (includes all antibiotics, pain medications, etc.) Yes   Is the patient taking all medications as directed (includes completed medication regime)? Yes   Does the patient have a follow up appointment scheduled with their surgeon? Yes   Has the patient kept scheduled appointments due by today? Yes   Comments Hospital PCP FOLLOW UP APPOINTMENT IS 4/7/22@0915am   What is the Home health agency?  [facility]-visit scheduled for 4/5/22   Has home health visited the patient within 72 hours of discharge? Call prior to 72 hours   Psychosocial issues? No   Did the patient receive a copy of their discharge instructions? Yes   Nursing interventions Reviewed instructions with patient   What is the patient's perception of their health status since discharge? Improving  [Pt recovering well,  no pain, eating well, wife has had him ambulating w/walker in hallway x 2 and doing some exercises. Wife confident in care and had no questions or concerns.]   Nursing interventions Nurse provided patient education  [routine post op instructions reviewed]   Is the patient /caregiver able to teach back basic post-op care? Continue use of incentive  spirometry at least 1 week post discharge, Practice 'cough and deep breath', Lifting as instructed by MD in discharge instructions, No tub bath, swimming, or hot tub until instructed by MD, Keep incision areas clean,dry and protected   Is the patient/caregiver able to teach back signs and symptoms of incisional infection? Increased redness, swelling or pain at the incisonal site, Increased drainage or bleeding, Incisional warmth, Pus or odor from incision, Fever  [surgical site w/o s/s infection noted]   Is the patient/caregiver able to teach back steps to recovery at home? Rest and rebuild strength, gradually increase activity, Set small, achievable goals for return to baseline health, Eat a well-balance diet   If the patient is a current smoker, are they able to teach back resources for cessation? Not a smoker   Is the patient/caregiver able to teach back the hierarchy of who to call/visit for symptoms/problems? PCP, Specialist, Home health nurse, Urgent Care, ED, 911 Yes   TCM call completed? Yes          Angela Yu RN    4/5/2022, 11:30 EDT

## 2022-04-05 NOTE — TELEPHONE ENCOUNTER
Cld pt to notify his appt with Dr Guidry has been rescheduled due to the HUB incorrectly sched the PO appt at a time office not open.    HUB: If pt calls back please inform him of his new appt date/time.  If the new time/date do not work for him, pls transfer to the office for assistance.

## 2022-04-05 NOTE — ED TRIAGE NOTES
Pt to ED from home via EMS with c/o L leg edema starting today. Pt reports being dx yesterday after having pneumonia.  Pt L calf larger than R.  Pt reports being on warfarin and lovenox.  Pt also c/o difficulty ambulated r/t to edema.  Positive pedal pulses noted with doppler. Pt denies SOA or CP.    Pt wearing mask, staff wearing appropriate PPE.

## 2022-04-05 NOTE — PROGRESS NOTES
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]             Anticoagulation Episode Summary     INR check location:      Preferred lab:      [facility]   [facility]    Comments:  New INR goal 3 - 3.5 (see 2020 hospitalization for CVA)      Anticoagulation Care Providers     Provider Role Specialty Phone number    Griffin Fried MD Referring Cardiology [phone]          Clinic Interview:  Patient Findings     Negatives:  Signs/symptoms of thrombosis, Signs/symptoms of bleeding,   Laboratory test error suspected, Change in health, Change in alcohol use,   Change in activity, Upcoming invasive procedure, Emergency department   visit, Upcoming dental procedure, Missed doses, Extra doses, Change in   medications, Change in diet/appetite, Hospital admission, Bruising, Other   complaints    Comments:  Patient was admitted 3/23-  acute cholecystitis/ aspiration   pneumonia. Warfarin was held and then restarted on 4/3 at 10 mg. Patient   is currently taking lovenox 70 mg subq BID.       Clinical Outcomes     Negatives:  Major bleeding event, Thromboembolic event,   Anticoagulation-related hospital admission, Anticoagulation-related ED   visit, Anticoagulation-related fatality    Comments:  Patient was admitted 3/23-  acute cholecystitis/ aspiration   pneumonia. Warfarin was held and then restarted on 4/3 at 10 mg.  Patient   is currently taking lovenox 70 mg subq BID.         INR History:  Anticoagulation Monitoring 3/11/2022 3/18/2022 4/5/2022   INR 3.20 3.50 1.30   INR Date 3/11/2022 3/18/2022 4/5/2022   INR Goal 3.0-3.5 3.0-3.5 3.0-3.5   Trend Same Same Same   Last Week Total 42.5 mg 42.5 mg 17.5 mg   Next Week Total 42.5 mg 42.5 mg 50 mg   Sun 5 mg 5 mg -   Mon 7.5 mg 7.5 mg -   Tue 5 mg 5 mg 10 mg (4/5)   Wed 7.5 mg 7.5 mg 10 mg (4/6)   Thu 5 mg 5 mg -   Fri 7.5 mg 7.5 mg -   Sat 5 mg 5 mg -   Visit Report - - -   Some recent data might be hidden       Plan:  1. INR is Subtherapeutic today- see above in Anticoagulation Summary.   Will instruct Francisco Sequeira to Increase their warfarin regimen- see above in Anticoagulation Summary. Continue enoxaparin 70 mg subq BID   2. Follow up in 2 days   3.  They have been instructed to call if any changes in medications, doses, concerns, etc. Patient expresses understanding and has no further questions at this time.    Michelle Piña [facility]

## 2022-04-06 ENCOUNTER — READMISSION MANAGEMENT (OUTPATIENT)
Dept: CALL CENTER | Facility: HOSPITAL | Age: 87
End: 2022-04-06

## 2022-04-06 ENCOUNTER — APPOINTMENT (OUTPATIENT)
Dept: CT IMAGING | Facility: HOSPITAL | Age: 87
End: 2022-04-06

## 2022-04-06 PROBLEM — S70.12XA HEMATOMA OF ILIOPSOAS MUSCLE, LEFT, INITIAL ENCOUNTER: Status: ACTIVE | Noted: 2022-04-06

## 2022-04-06 PROBLEM — Z86.73 HISTORY OF CVA (CEREBROVASCULAR ACCIDENT): Status: ACTIVE | Noted: 2022-04-06

## 2022-04-06 PROBLEM — D64.9 ANEMIA: Status: ACTIVE | Noted: 2022-04-06

## 2022-04-06 PROBLEM — Z90.49 S/P LAPAROSCOPIC CHOLECYSTECTOMY: Status: ACTIVE | Noted: 2022-04-06

## 2022-04-06 LAB
ALBUMIN SERPL-MCNC: 2.7 G/DL (ref 3.5–5.2)
ALBUMIN/GLOB SERPL: 0.8 G/DL
ALP SERPL-CCNC: 131 U/L (ref 39–117)
ALT SERPL W P-5'-P-CCNC: 11 U/L (ref 1–41)
ANION GAP SERPL CALCULATED.3IONS-SCNC: 5 MMOL/L (ref 5–15)
ANION GAP SERPL CALCULATED.3IONS-SCNC: 8.4 MMOL/L (ref 5–15)
AST SERPL-CCNC: 14 U/L (ref 1–40)
BH CV LOWER VASCULAR LEFT COMMON FEMORAL AUGMENT: NORMAL
BH CV LOWER VASCULAR LEFT COMMON FEMORAL COMPETENT: NORMAL
BH CV LOWER VASCULAR LEFT COMMON FEMORAL COMPRESS: NORMAL
BH CV LOWER VASCULAR LEFT COMMON FEMORAL PHASIC: NORMAL
BH CV LOWER VASCULAR LEFT COMMON FEMORAL SPONT: NORMAL
BH CV LOWER VASCULAR LEFT DISTAL FEMORAL COMPRESS: NORMAL
BH CV LOWER VASCULAR LEFT GASTRONEMIUS COMPRESS: NORMAL
BH CV LOWER VASCULAR LEFT GREATER SAPH AK COMPRESS: NORMAL
BH CV LOWER VASCULAR LEFT GREATER SAPH BK COMPRESS: NORMAL
BH CV LOWER VASCULAR LEFT LESSER SAPH COMPRESS: NORMAL
BH CV LOWER VASCULAR LEFT MID FEMORAL AUGMENT: NORMAL
BH CV LOWER VASCULAR LEFT MID FEMORAL COMPETENT: NORMAL
BH CV LOWER VASCULAR LEFT MID FEMORAL COMPRESS: NORMAL
BH CV LOWER VASCULAR LEFT MID FEMORAL PHASIC: NORMAL
BH CV LOWER VASCULAR LEFT MID FEMORAL SPONT: NORMAL
BH CV LOWER VASCULAR LEFT PERONEAL COMPRESS: NORMAL
BH CV LOWER VASCULAR LEFT POPLITEAL AUGMENT: NORMAL
BH CV LOWER VASCULAR LEFT POPLITEAL COMPETENT: NORMAL
BH CV LOWER VASCULAR LEFT POPLITEAL COMPRESS: NORMAL
BH CV LOWER VASCULAR LEFT POPLITEAL PHASIC: NORMAL
BH CV LOWER VASCULAR LEFT POPLITEAL SPONT: NORMAL
BH CV LOWER VASCULAR LEFT POSTERIOR TIBIAL COMPRESS: NORMAL
BH CV LOWER VASCULAR LEFT PROFUNDA FEMORAL COMPRESS: NORMAL
BH CV LOWER VASCULAR LEFT PROXIMAL FEMORAL COMPRESS: NORMAL
BH CV LOWER VASCULAR LEFT SAPHENOFEMORAL JUNCTION COMPRESS: NORMAL
BH CV LOWER VASCULAR RIGHT COMMON FEMORAL AUGMENT: NORMAL
BH CV LOWER VASCULAR RIGHT COMMON FEMORAL COMPETENT: NORMAL
BH CV LOWER VASCULAR RIGHT COMMON FEMORAL COMPRESS: NORMAL
BH CV LOWER VASCULAR RIGHT COMMON FEMORAL PHASIC: NORMAL
BH CV LOWER VASCULAR RIGHT COMMON FEMORAL SPONT: NORMAL
BILIRUB SERPL-MCNC: 0.6 MG/DL (ref 0–1.2)
BUN SERPL-MCNC: 11 MG/DL (ref 8–23)
BUN SERPL-MCNC: 11 MG/DL (ref 8–23)
BUN/CREAT SERPL: 10.1 (ref 7–25)
BUN/CREAT SERPL: 9.6 (ref 7–25)
CALCIUM SPEC-SCNC: 8.1 MG/DL (ref 8.6–10.5)
CALCIUM SPEC-SCNC: 8.5 MG/DL (ref 8.6–10.5)
CHLORIDE SERPL-SCNC: 100 MMOL/L (ref 98–107)
CHLORIDE SERPL-SCNC: 101 MMOL/L (ref 98–107)
CO2 SERPL-SCNC: 29.6 MMOL/L (ref 22–29)
CO2 SERPL-SCNC: 32 MMOL/L (ref 22–29)
CREAT SERPL-MCNC: 1.09 MG/DL (ref 0.76–1.27)
CREAT SERPL-MCNC: 1.14 MG/DL (ref 0.76–1.27)
DEPRECATED RDW RBC AUTO: 52.5 FL (ref 37–54)
EGFRCR SERPLBLD CKD-EPI 2021: 63.4 ML/MIN/1.73
EGFRCR SERPLBLD CKD-EPI 2021: 66.9 ML/MIN/1.73
ERYTHROCYTE [DISTWIDTH] IN BLOOD BY AUTOMATED COUNT: 17.4 % (ref 12.3–15.4)
GLOBULIN UR ELPH-MCNC: 3.6 GM/DL
GLUCOSE BLDC GLUCOMTR-MCNC: 148 MG/DL (ref 70–130)
GLUCOSE BLDC GLUCOMTR-MCNC: 162 MG/DL (ref 70–130)
GLUCOSE BLDC GLUCOMTR-MCNC: 213 MG/DL (ref 70–130)
GLUCOSE BLDC GLUCOMTR-MCNC: 225 MG/DL (ref 70–130)
GLUCOSE SERPL-MCNC: 161 MG/DL (ref 65–99)
GLUCOSE SERPL-MCNC: 184 MG/DL (ref 65–99)
HCT VFR BLD AUTO: 24.8 % (ref 37.5–51)
HCT VFR BLD AUTO: 25.4 % (ref 37.5–51)
HCT VFR BLD AUTO: 25.4 % (ref 37.5–51)
HGB BLD-MCNC: 7.4 G/DL (ref 13–17.7)
HGB BLD-MCNC: 7.8 G/DL (ref 13–17.7)
HGB BLD-MCNC: 7.8 G/DL (ref 13–17.7)
HOLD SPECIMEN: NORMAL
MAXIMAL PREDICTED HEART RATE: 136 BPM
MCH RBC QN AUTO: 25.7 PG (ref 26.6–33)
MCHC RBC AUTO-ENTMCNC: 30.7 G/DL (ref 31.5–35.7)
MCV RBC AUTO: 83.6 FL (ref 79–97)
PLATELET # BLD AUTO: 213 10*3/MM3 (ref 140–450)
PMV BLD AUTO: 12 FL (ref 6–12)
POTASSIUM SERPL-SCNC: 4.1 MMOL/L (ref 3.5–5.2)
POTASSIUM SERPL-SCNC: 4.2 MMOL/L (ref 3.5–5.2)
PROT SERPL-MCNC: 6.3 G/DL (ref 6–8.5)
QT INTERVAL: 391 MS
RBC # BLD AUTO: 3.04 10*6/MM3 (ref 4.14–5.8)
SARS-COV-2 RNA RESP QL NAA+PROBE: NOT DETECTED
SODIUM SERPL-SCNC: 138 MMOL/L (ref 136–145)
SODIUM SERPL-SCNC: 138 MMOL/L (ref 136–145)
STRESS TARGET HR: 116 BPM
TROPONIN T SERPL-MCNC: 0.03 NG/ML (ref 0–0.03)
WBC NRBC COR # BLD: 7.4 10*3/MM3 (ref 3.4–10.8)
WHOLE BLOOD HOLD SPECIMEN: NORMAL
WHOLE BLOOD HOLD SPECIMEN: NORMAL

## 2022-04-06 PROCEDURE — 80053 COMPREHEN METABOLIC PANEL: CPT | Performed by: NURSE PRACTITIONER

## 2022-04-06 PROCEDURE — 85014 HEMATOCRIT: CPT | Performed by: NURSE PRACTITIONER

## 2022-04-06 PROCEDURE — 70450 CT HEAD/BRAIN W/O DYE: CPT

## 2022-04-06 PROCEDURE — 99232 SBSQ HOSP IP/OBS MODERATE 35: CPT | Performed by: NURSE PRACTITIONER

## 2022-04-06 PROCEDURE — 82962 GLUCOSE BLOOD TEST: CPT

## 2022-04-06 PROCEDURE — 25010000002 ONDANSETRON PER 1 MG: Performed by: EMERGENCY MEDICINE

## 2022-04-06 PROCEDURE — 85018 HEMOGLOBIN: CPT | Performed by: NURSE PRACTITIONER

## 2022-04-06 PROCEDURE — 85027 COMPLETE CBC AUTOMATED: CPT | Performed by: NURSE PRACTITIONER

## 2022-04-06 PROCEDURE — 72131 CT LUMBAR SPINE W/O DYE: CPT

## 2022-04-06 PROCEDURE — 36415 COLL VENOUS BLD VENIPUNCTURE: CPT | Performed by: NURSE PRACTITIONER

## 2022-04-06 PROCEDURE — U0005 INFEC AGEN DETEC AMPLI PROBE: HCPCS | Performed by: EMERGENCY MEDICINE

## 2022-04-06 PROCEDURE — 73700 CT LOWER EXTREMITY W/O DYE: CPT

## 2022-04-06 PROCEDURE — 84484 ASSAY OF TROPONIN QUANT: CPT | Performed by: EMERGENCY MEDICINE

## 2022-04-06 PROCEDURE — U0003 INFECTIOUS AGENT DETECTION BY NUCLEIC ACID (DNA OR RNA); SEVERE ACUTE RESPIRATORY SYNDROME CORONAVIRUS 2 (SARS-COV-2) (CORONAVIRUS DISEASE [COVID-19]), AMPLIFIED PROBE TECHNIQUE, MAKING USE OF HIGH THROUGHPUT TECHNOLOGIES AS DESCRIBED BY CMS-2020-01-R: HCPCS | Performed by: EMERGENCY MEDICINE

## 2022-04-06 PROCEDURE — 99222 1ST HOSP IP/OBS MODERATE 55: CPT | Performed by: NURSE PRACTITIONER

## 2022-04-06 RX ORDER — FUROSEMIDE 20 MG/1
40 TABLET ORAL DAILY
Status: DISCONTINUED | OUTPATIENT
Start: 2022-04-06 | End: 2022-04-08

## 2022-04-06 RX ORDER — SODIUM CHLORIDE 0.9 % (FLUSH) 0.9 %
10 SYRINGE (ML) INJECTION AS NEEDED
Status: DISCONTINUED | OUTPATIENT
Start: 2022-04-06 | End: 2022-04-20 | Stop reason: HOSPADM

## 2022-04-06 RX ORDER — DIGOXIN 125 MCG
62.5 TABLET ORAL
Status: DISCONTINUED | OUTPATIENT
Start: 2022-04-06 | End: 2022-04-20 | Stop reason: HOSPADM

## 2022-04-06 RX ORDER — NICOTINE POLACRILEX 4 MG
15 LOZENGE BUCCAL
Status: DISCONTINUED | OUTPATIENT
Start: 2022-04-06 | End: 2022-04-20 | Stop reason: HOSPADM

## 2022-04-06 RX ORDER — ONDANSETRON 4 MG/1
4 TABLET, FILM COATED ORAL EVERY 6 HOURS PRN
Status: DISCONTINUED | OUTPATIENT
Start: 2022-04-06 | End: 2022-04-20 | Stop reason: HOSPADM

## 2022-04-06 RX ORDER — ACETAMINOPHEN 160 MG/5ML
650 SOLUTION ORAL EVERY 4 HOURS PRN
Status: DISCONTINUED | OUTPATIENT
Start: 2022-04-06 | End: 2022-04-20 | Stop reason: HOSPADM

## 2022-04-06 RX ORDER — ONDANSETRON 2 MG/ML
4 INJECTION INTRAMUSCULAR; INTRAVENOUS ONCE
Status: COMPLETED | OUTPATIENT
Start: 2022-04-06 | End: 2022-04-06

## 2022-04-06 RX ORDER — DIPHENHYDRAMINE HCL 25 MG
25 CAPSULE ORAL 2 TIMES DAILY PRN
Status: DISCONTINUED | OUTPATIENT
Start: 2022-04-06 | End: 2022-04-20 | Stop reason: HOSPADM

## 2022-04-06 RX ORDER — FAMOTIDINE 20 MG/1
20 TABLET, FILM COATED ORAL
Status: DISCONTINUED | OUTPATIENT
Start: 2022-04-06 | End: 2022-04-20 | Stop reason: HOSPADM

## 2022-04-06 RX ORDER — CALCIUM CARBONATE 200(500)MG
2 TABLET,CHEWABLE ORAL 2 TIMES DAILY PRN
Status: DISCONTINUED | OUTPATIENT
Start: 2022-04-06 | End: 2022-04-20 | Stop reason: HOSPADM

## 2022-04-06 RX ORDER — ACETAMINOPHEN 650 MG/1
650 SUPPOSITORY RECTAL EVERY 4 HOURS PRN
Status: DISCONTINUED | OUTPATIENT
Start: 2022-04-06 | End: 2022-04-20 | Stop reason: HOSPADM

## 2022-04-06 RX ORDER — DEXTROSE MONOHYDRATE 25 G/50ML
25 INJECTION, SOLUTION INTRAVENOUS
Status: DISCONTINUED | OUTPATIENT
Start: 2022-04-06 | End: 2022-04-20 | Stop reason: HOSPADM

## 2022-04-06 RX ORDER — MAGNESIUM OXIDE 400 MG/1
400 TABLET ORAL 2 TIMES DAILY
Status: DISCONTINUED | OUTPATIENT
Start: 2022-04-06 | End: 2022-04-20 | Stop reason: HOSPADM

## 2022-04-06 RX ORDER — ACETAMINOPHEN 325 MG/1
650 TABLET ORAL EVERY 4 HOURS PRN
Status: DISCONTINUED | OUTPATIENT
Start: 2022-04-06 | End: 2022-04-20 | Stop reason: HOSPADM

## 2022-04-06 RX ORDER — SODIUM CHLORIDE 0.9 % (FLUSH) 0.9 %
10 SYRINGE (ML) INJECTION EVERY 12 HOURS SCHEDULED
Status: DISCONTINUED | OUTPATIENT
Start: 2022-04-06 | End: 2022-04-20 | Stop reason: HOSPADM

## 2022-04-06 RX ORDER — INSULIN LISPRO 100 [IU]/ML
0-9 INJECTION, SOLUTION INTRAVENOUS; SUBCUTANEOUS
Status: DISCONTINUED | OUTPATIENT
Start: 2022-04-06 | End: 2022-04-20 | Stop reason: HOSPADM

## 2022-04-06 RX ORDER — ASPIRIN 81 MG/1
81 TABLET ORAL DAILY
Status: DISCONTINUED | OUTPATIENT
Start: 2022-04-06 | End: 2022-04-06

## 2022-04-06 RX ORDER — ONDANSETRON 2 MG/ML
4 INJECTION INTRAMUSCULAR; INTRAVENOUS EVERY 6 HOURS PRN
Status: DISCONTINUED | OUTPATIENT
Start: 2022-04-06 | End: 2022-04-20 | Stop reason: HOSPADM

## 2022-04-06 RX ORDER — VANCOMYCIN HYDROCHLORIDE 125 MG/1
125 CAPSULE ORAL EVERY OTHER DAY
Status: COMPLETED | OUTPATIENT
Start: 2022-04-06 | End: 2022-04-14

## 2022-04-06 RX ORDER — ATORVASTATIN CALCIUM 20 MG/1
40 TABLET, FILM COATED ORAL DAILY
Status: DISCONTINUED | OUTPATIENT
Start: 2022-04-06 | End: 2022-04-20 | Stop reason: HOSPADM

## 2022-04-06 RX ORDER — METOPROLOL SUCCINATE 25 MG/1
12.5 TABLET, EXTENDED RELEASE ORAL DAILY
Status: DISCONTINUED | OUTPATIENT
Start: 2022-04-06 | End: 2022-04-20 | Stop reason: HOSPADM

## 2022-04-06 RX ORDER — L.ACID,PARA/B.BIFIDUM/S.THERM 8B CELL
1 CAPSULE ORAL DAILY
Status: DISCONTINUED | OUTPATIENT
Start: 2022-04-06 | End: 2022-04-20 | Stop reason: HOSPADM

## 2022-04-06 RX ORDER — FERROUS SULFATE 325(65) MG
325 TABLET ORAL EVERY OTHER DAY
Status: DISCONTINUED | OUTPATIENT
Start: 2022-04-06 | End: 2022-04-20 | Stop reason: HOSPADM

## 2022-04-06 RX ADMIN — ONDANSETRON 4 MG: 2 INJECTION INTRAMUSCULAR; INTRAVENOUS at 01:35

## 2022-04-06 RX ADMIN — FAMOTIDINE 20 MG: 20 TABLET ORAL at 17:19

## 2022-04-06 RX ADMIN — Medication 10 ML: at 09:17

## 2022-04-06 RX ADMIN — FUROSEMIDE 40 MG: 20 TABLET ORAL at 13:06

## 2022-04-06 RX ADMIN — Medication 1 CAPSULE: at 13:12

## 2022-04-06 RX ADMIN — ACETAMINOPHEN 650 MG: 325 TABLET ORAL at 07:30

## 2022-04-06 RX ADMIN — ASPIRIN 81 MG: 81 TABLET, COATED ORAL at 13:13

## 2022-04-06 RX ADMIN — METOPROLOL SUCCINATE 12.5 MG: 25 TABLET, EXTENDED RELEASE ORAL at 13:13

## 2022-04-06 RX ADMIN — MAGNESIUM OXIDE 400 MG (241.3 MG MAGNESIUM) TABLET 400 MG: TABLET at 13:12

## 2022-04-06 RX ADMIN — Medication 10 ML: at 21:39

## 2022-04-06 RX ADMIN — ACETAMINOPHEN 650 MG: 325 TABLET ORAL at 15:04

## 2022-04-06 RX ADMIN — VANCOMYCIN HYDROCHLORIDE 125 MG: 125 CAPSULE ORAL at 13:07

## 2022-04-06 RX ADMIN — MAGNESIUM OXIDE 400 MG (241.3 MG MAGNESIUM) TABLET 400 MG: TABLET at 21:39

## 2022-04-06 RX ADMIN — ATORVASTATIN CALCIUM 40 MG: 20 TABLET, FILM COATED ORAL at 13:13

## 2022-04-06 RX ADMIN — DIGOXIN 62.5 MCG: 125 TABLET ORAL at 13:07

## 2022-04-06 RX ADMIN — FERROUS SULFATE TAB 325 MG (65 MG ELEMENTAL FE) 325 MG: 325 (65 FE) TAB at 13:12

## 2022-04-06 NOTE — H&P
Patient Name:  Francisco Sequeira  YOB: 1935  MRN:  [id number]  Admit Date:  4/5/2022  Patient Care Team:  Rubin Hinkle APRN as PCP - General (Family Medicine)  Audra Vazquez RPH as Pharmacist  Vasquez Niño, PharmD as Pharmacist (Pharmacy)  Tatiana Tinoco MD as Consulting Physician (Gastroenterology)      Subjective   History Present Illness     Chief Complaint   Patient presents with   • Leg Swelling       Mr. Sequeira is a 86 y.o. male former smoker with a history of valvular heart disease, afib, chronic AC, CKD, DM, HTN, NICM, and multiple medical issues that presents to [facility] complaining of LLE weakness, swelling. He was discharged from [facility] 4/4/22 following hospitalization for acute cholecystitis s/p lap cholecystectomy 3/29/22 and aspiration pneumonia. He was discharged on lovenox bridge and warfarin for subtherapeutic INR. He was doing well as far as walking around at discharge. Yesterday he began feeling weak in his LLE, having difficulty walking, and was unable to get up off of the toilet. Denies severe pain but has had lower leg edema that is uncomfortable. His wife reports his INR to be 1.3 yesterday. Pt denies fever, chest pain, shortness of breath, n/v/d, dysuria, back pain, numbness/tingling.    Afebrile. HR controlled. BP stable. On room air. WBC 7.51, Hgb 8.6. Trop 0.030. Gluc 184, CO2 29.6, Ca 8.5, Alb 2.70, Alk phos 131.Covid neg. CTH: no acute findings. CT L spine: no acute fx/subluxation; no acute Lspine findings; lt iliacus & lt iliopsoas muscle asymmetry/enlargement may represent hemorrhage/hematoma. CT LLE: no acute osseous findings; diffuse LLE edema. LLE doppler: normal      Review of Systems   Constitutional: Negative for fever.   HENT: Negative for congestion.    Respiratory: Negative for shortness of breath.    Cardiovascular: Positive for leg swelling. Negative for chest pain.   Gastrointestinal: Negative for nausea.   Genitourinary: Negative for  difficulty urinating.   Musculoskeletal: Positive for gait problem and myalgias. Negative for arthralgias.   Skin: Negative for rash.   Neurological: Negative for headaches.   Psychiatric/Behavioral: Negative for sleep disturbance.        Personal History     Past Medical History:   Diagnosis Date   • Allergic rhinitis    • Aortic valve insufficiency    • Ascending aortic aneurysm (HCC)    • Atrial fibrillation (HCC)    • Bacteremia    • Calcific aortic stenosis of bicuspid valve    • Cardiac arrest (HCC)    • Cardiomyopathy (HCC)    • CKD (chronic kidney disease) stage 3, GFR 30-59 ml/min (HCC)    • Contact dermatitis due to poison ivy    • Elbow fracture    • Esophageal reflux    • GERD (gastroesophageal reflux disease)    • Head injury    • History of transfusion    • Hyperglycemia    • Hyperlipidemia    • Hypertension    • Kidney carcinoma (HCC)    • Nonischemic cardiomyopathy (HCC)    • Renal oncocytoma    • Seasonal allergic reaction    • Sinusitis    • Syncope    • Type 2 diabetes mellitus (HCC)     uncontrolled   • Visual field defect      Past Surgical History:   Procedure Laterality Date   • AORTIC VALVE REPAIR/REPLACEMENT     • ASCENDING AORTIC ANEURYSM REPAIR W/ MECHANICAL AORTIC VALVE REPLACEMENT     • CHOLECYSTECTOMY WITH INTRAOPERATIVE CHOLANGIOGRAM N/A 3/29/2022    Procedure: Laparoscopic cholecystectomy with cholangiogram, possible open;  Surgeon: Lisa Guidry MD;  Location: [facility] MAIN OR;  Service: General;  Laterality: N/A;   • COLONOSCOPY N/A 10/28/2021    Procedure: COLONOSCOPY to cecum:  cold snare polyps,;  Surgeon: Dinesh Meza MD;  Location: [facility] ENDOSCOPY;  Service: Gastroenterology;  Laterality: N/A;  pre:  Iron deficiency anemia  post:  polyps, diverticulosis,    • COLONOSCOPY N/A 11/9/2021    Procedure: COLONOSCOPY to cecum with APC cautery of AVM and clip placement x1;  Surgeon: Pavan Rodriguez MD;  Location: [facility] ENDOSCOPY;  Service: Gastroenterology;   Laterality: N/A;  PRE - gi bleed, anemia  POST - diverticulosis, poor prep, AVM right colon   • ENDOSCOPY N/A 10/28/2021    Procedure: ESOPHAGOGASTRODUODENOSCOPY with biopsies;  Surgeon: Dinesh Meza MD;  Location: [facility] ENDOSCOPY;  Service: Gastroenterology;  Laterality: N/A;  pre:  Iron deficiency anemia  post:  duodenitis and gastritis   • EYE SURGERY  12/09/2020    cataract surgery    • NEPHRECTOMY     • OTHER SURGICAL HISTORY      elbow surgery   • OTHER SURGICAL HISTORY      right arm surgery   • PROSTATE SURGERY     • THORACENTESIS Left     diagnostic     Family History   Problem Relation Age of Onset   • Cancer Mother         colon   • Cancer Brother         colon     Social History     Tobacco Use   • Smoking status: Former Smoker   • Smokeless tobacco: Former User   • Tobacco comment: caffeine use   Vaping Use   • Vaping Use: Never used   Substance Use Topics   • Alcohol use: Yes     Comment: occasional   • Drug use: No     No current facility-administered medications on file prior to encounter.     Current Outpatient Medications on File Prior to Encounter   Medication Sig Dispense Refill   • aspirin 81 MG EC tablet Take 1 tablet by mouth Daily. 90 tablet 0   • atorvastatin (LIPITOR) 40 MG tablet Take 40 mg by mouth Daily.     • digoxin (LANOXIN) 125 MCG tablet TAKE ONE TABLET BY MOUTH DAILY (Patient taking differently: Take 62.5 mcg by mouth Daily.) 90 tablet 1   • diphenhydrAMINE (BENADRYL) 25 mg capsule Take 1 capsule by mouth 2 (Two) Times a Day As Needed for Itching, Allergies or Sleep.     • enoxaparin (LOVENOX) 80 MG/0.8ML solution syringe Discard 0.1 ml and Inject 0.7 mL under the skin into the appropriate area as directed Every 12 (Twelve) Hours. Continue until INR is therapeutic. 22.4 mL 6   • famotidine (PEPCID) 20 MG tablet Take 1 tablet by mouth 2 (Two) Times a Day Before Meals for 30 days. 60 tablet 0   • ferrous gluconate (FERGON) 324 MG tablet Take 1 tablet by mouth Every Other  Day. 90 tablet 1   • furosemide (Lasix) 40 MG tablet Take 1 tablet by mouth Daily. 90 tablet 1   • Insulin Degludec (TRESIBA FLEXTOUCH SC) Inject 30-65 Units under the skin into the appropriate area as directed Daily As Needed. ---NOT USING THE TRESHIBA.------     • Insulin Disposable Pump (V-Go 40) kit USE AS DIRECTED THREE TIMES A DAY 30 each 11   • insulin lispro (humaLOG) 100 UNIT/ML injection USE AS DIRECTED WITH V-GO PUMP 30 mL 6   • lactobacillus acidophilus (RISAQUAD) capsule capsule Take 1 capsule by mouth Daily for 30 days. 30 capsule 0   • magnesium oxide (MAG-OX) 400 MG tablet Take 400 mg by mouth 2 (Two) Times a Day.     • metoprolol succinate XL (TOPROL-XL) 25 MG 24 hr tablet Take 0.5 tablets by mouth Daily for 30 days. 45 tablet 3   • Omega-3 Fatty Acids (FISH OIL) 1000 MG capsule capsule Take 4,000 mg by mouth Daily With Breakfast.     • saccharomyces boulardii (FLORASTOR) 250 MG capsule Take 1 capsule by mouth 2 (Two) Times a Day for 30 days. 60 capsule 0   • vancomycin (VANCOCIN) 125 MG capsule Take 1 capsule by mouth Every Other Day for 7 doses. 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 115 tablet 1     Allergies   Allergen Reactions   • Other Other (See Comments)     Grass, ragweed   • Penicillins Swelling   • Percocet [Oxycodone-Acetaminophen] Nausea And Vomiting       Objective    Objective     Vital Signs  Temp:  [97.2 °F (36.2 °C)-98.7 °F (37.1 °C)] 97.2 °F (36.2 °C)  Heart Rate:  [68-88] 88  Resp:  [16-18] 16  BP: (103-150)/(57-79) 138/79  SpO2:  [98 %-100 %] 100 %  on   ;   Device (Oxygen Therapy): room air  Body mass index is 21.16 kg/m².    Physical Exam  Vitals and nursing note reviewed.   Constitutional:       General: He is not in acute distress.     Appearance: He is ill-appearing.   HENT:      Head: Normocephalic.      Mouth/Throat:      Mouth: Mucous membranes are  moist.   Eyes:      Conjunctiva/sclera: Conjunctivae normal.   Cardiovascular:      Rate and Rhythm: Normal rate and regular rhythm.   Pulmonary:      Effort: Pulmonary effort is normal. No respiratory distress.      Breath sounds: No wheezing or rales.   Abdominal:      General: Bowel sounds are normal.      Palpations: Abdomen is soft.   Musculoskeletal:      Cervical back: Neck supple.      Right lower leg: No edema.      Left lower leg: Edema present.      Comments: 2+  Firmness lt hip; nontender   Skin:     General: Skin is warm and dry.   Neurological:      Mental Status: He is alert and oriented to person, place, and time.   Psychiatric:         Mood and Affect: Mood normal.         Behavior: Behavior normal.         Results Review:  I reviewed the patient's new clinical results.  I reviewed the patient's new imaging results and agree with the interpretation.  I reviewed the patient's other test results and agree with the interpretation  I personally viewed and interpreted the patient's EKG/Telemetry data    Lab Results (last 24 hours)     Procedure Component Value Units Date/Time    CBC & Differential [[id number]]  (Abnormal) Collected: 04/05/22 2304    Specimen: Blood Updated: 04/05/22 2336    Narrative:      The following orders were created for panel order CBC & Differential.  Procedure                               Abnormality         Status                     ---------                               -----------         ------                     CBC Auto Differential[[id number]]        Abnormal            Final result                 Please view results for these tests on the individual orders.    CBC Auto Differential [[id number]]  (Abnormal) Collected: 04/05/22 2304    Specimen: Blood Updated: 04/05/22 2336     WBC 7.51 10*3/mm3      RBC 3.29 10*6/mm3      Hemoglobin 8.6 g/dL      Hematocrit 27.2 %      MCV 82.7 fL      MCH 26.1 pg      MCHC 31.6 g/dL      RDW 17.0 %      RDW-SD 51.1 fl      MPV 12.4 fL       Platelets 242 10*3/mm3      Neutrophil % 83.1 %      Lymphocyte % 9.5 %      Monocyte % 6.0 %      Eosinophil % 0.7 %      Basophil % 0.3 %      Immature Grans % 0.4 %      Neutrophils, Absolute 6.25 10*3/mm3      Lymphocytes, Absolute 0.71 10*3/mm3      Monocytes, Absolute 0.45 10*3/mm3      Eosinophils, Absolute 0.05 10*3/mm3      Basophils, Absolute 0.02 10*3/mm3      Immature Grans, Absolute 0.03 10*3/mm3      nRBC 0.0 /100 WBC     Protime-INR [[id number]]  (Abnormal) Collected: 04/05/22 2304    Specimen: Blood Updated: 04/05/22 2325     Protime 15.1 Seconds      INR 1.20    Comprehensive Metabolic Panel [[id number]]  (Abnormal) Collected: 04/06/22 0002    Specimen: Blood Updated: 04/06/22 0047     Glucose 184 mg/dL      BUN 11 mg/dL      Creatinine 1.09 mg/dL      Sodium 138 mmol/L      Potassium 4.1 mmol/L      Chloride 100 mmol/L      CO2 29.6 mmol/L      Calcium 8.5 mg/dL      Total Protein 6.3 g/dL      Albumin 2.70 g/dL      ALT (SGPT) 11 U/L      AST (SGOT) 14 U/L      Alkaline Phosphatase 131 U/L      Total Bilirubin 0.6 mg/dL      Globulin 3.6 gm/dL      A/G Ratio 0.8 g/dL      BUN/Creatinine Ratio 10.1     Anion Gap 8.4 mmol/L      eGFR 66.9 mL/min/1.73      Comment: National Kidney Foundation and American Society of Nephrology (ASN) Task Force recommended calculation based on the Chronic Kidney Disease Epidemiology Collaboration (CKD-EPI) equation refit without adjustment for race.       Narrative:      GFR Normal >60  Chronic Kidney Disease <60  Kidney Failure <15      Troponin [[id number]]  (Normal) Collected: 04/06/22 0002    Specimen: Blood Updated: 04/06/22 0035     Troponin T 0.030 ng/mL     Narrative:      Troponin T Reference Range:  <= 0.03 ng/mL-   Negative for AMI  >0.03 ng/mL-     Abnormal for myocardial necrosis.  Clinicians would have to utilize clinical acumen, EKG, Troponin and serial changes to determine if it is an Acute Myocardial Infarction or myocardial injury due to an  underlying chronic condition.       Results may be falsely decreased if patient taking Biotin.      COVID PRE-OP / PRE-PROCEDURE SCREENING ORDER (NO ISOLATION) - Swab, Nasopharynx [[id number]]  (Normal) Collected: 04/06/22 0421    Specimen: Swab from Nasopharynx Updated: 04/06/22 0506    Narrative:      The following orders were created for panel order COVID PRE-OP / PRE-PROCEDURE SCREENING ORDER (NO ISOLATION) - Swab, Nasopharynx.  Procedure                               Abnormality         Status                     ---------                               -----------         ------                     COVID-19,[facility] IN-HOUSE...[[id number]]  Normal              Final result                 Please view results for these tests on the individual orders.    COVID-19,[facility]HOU[facility] CEPHEID/PORFIRIO NP SWAB IN TRANSPORT MEDIA 8-12 HR TAT - Swab, Nasopharynx [[id number]]  (Normal) Collected: 04/06/22 0421    Specimen: Swab from Nasopharynx Updated: 04/06/22 0506     COVID19 Not Detected    Narrative:      Fact sheet for providers: https://www.fda.gov/media/607751/download     Fact sheet for patients: https://www.fda.gov/media/009458/download    POC Glucose Once [[id number]]  (Abnormal) Collected: 04/06/22 0944    Specimen: Blood Updated: 04/06/22 0945     Glucose 162 mg/dL      Comment: Meter: [id number] : [id number] ALEJO       Basic Metabolic Panel [[id number]]  (Abnormal) Collected: 04/06/22 0951    Specimen: Blood Updated: 04/06/22 1055     Glucose 161 mg/dL      BUN 11 mg/dL      Creatinine 1.14 mg/dL      Sodium 138 mmol/L      Potassium 4.2 mmol/L      Chloride 101 mmol/L      CO2 32.0 mmol/L      Calcium 8.1 mg/dL      BUN/Creatinine Ratio 9.6     Anion Gap 5.0 mmol/L      eGFR 63.4 mL/min/1.73      Comment: National Kidney Foundation and American Society of Nephrology (ASN) Task Force recommended calculation based on the Chronic Kidney Disease Epidemiology Collaboration (CKD-EPI) equation refit without  adjustment for race.       Narrative:      GFR Normal >60  Chronic Kidney Disease <60  Kidney Failure <15      CBC (No Diff) [[id number]]  (Abnormal) Collected: 04/06/22 0951    Specimen: Blood Updated: 04/06/22 1025     WBC 7.40 10*3/mm3      RBC 3.04 10*6/mm3      Hemoglobin 7.8 g/dL      Hematocrit 25.4 %      MCV 83.6 fL      MCH 25.7 pg      MCHC 30.7 g/dL      RDW 17.4 %      RDW-SD 52.5 fl      MPV 12.0 fL      Platelets 213 10*3/mm3     Hemoglobin & Hematocrit, Blood [[id number]]  (Abnormal) Collected: 04/06/22 0951    Specimen: Blood Updated: 04/06/22 1025     Hemoglobin 7.8 g/dL      Hematocrit 25.4 %           Imaging Results (Last 24 Hours)     Procedure Component Value Units Date/Time    CT Lumbar Spine Without Contrast [[id number]] Collected: 04/06/22 0227     Updated: 04/06/22 0246    Narrative:      CT OF THE LUMBAR SPINE     HISTORY: Left lower extremity weakness     COMPARISON: 03/23/2022     TECHNIQUE: Axial CT imaging was obtained through the lumbar spine.  Coronal and sagittal reformatted images were obtained.     FINDINGS:  No acute fracture or subluxation of the lumbar spine is identified.  Intervertebral disc spaces appear relatively well-maintained. Lumbar  vertebral body alignment appears within normal limits.     T12-L1: There is no canal stenosis or neural foraminal narrowing.  L1-L2: There is broad-based disc bulge. This results in narrowing of the  canal, as well as bilateral neural foraminal narrowing, right greater  than left.  L2-L3: There is broad-based disc bulge. This results in canal narrowing,  as well as neural foraminal narrowing, right greater than left.  L3-L4: Disc bulge results in canal stenosis. There is bilateral neural  foraminal narrowing.  L4-L5: There is broad-based disc bulge. There is canal stenosis. There  is bilateral neural foraminal narrowing, left greater than right.  L5-S1: There is no canal stenosis or neural foraminal narrowing.        This patient has  stranding which is seen posterior to the left psoas  muscle, which is new when compared to prior exam. There is also  asymmetric enlargement of the patient's left iliacus muscle, with  displacement of the left psoas muscle anteriorly. Appearance is  concerning for hematoma, given history of anticoagulation, although this  is incompletely assessed on this exam. Centrally, there is more focal  area of decreased attenuation. This could reflect some liquefying  hematoma, but correlation with any evidence of infection is recommended.  There is additional fluid and stranding which is seen within the left  perivesical space. There is some free fluid within the pelvis within the  right hemipelvis. The patient is noted to have a partially visualized  moderate right pleural effusion. There also is some trace pleural fluid  on the left.       Impression:         1. No acute osseous findings.  2. Degenerative changes in the spine, as noted above.  3. Asymmetric enlargement of the patient's left iliacus and left  iliopsoas muscle, incompletely assessed on this exam. Given history of  anticoagulation, this may represent hemorrhage. There are areas of more  central low attenuation, which could reflect hematoma liquefaction, but  correlation with any evidence of infection is recommended. The  hemorrhage does abut the patient's left psoas muscle as well, and there  is also some stranding and fluid seen within the left perivesical space.     FINDINGS were discussed with Dr. Dubose at 2:40 AM.     Radiation dose reduction techniques were utilized, including automated  exposure control and exposure modulation based on body size.     This report was finalized on 4/6/2022 2:43 AM by Dr. Yue Frye M.D.       CT Lower Extremity Left Without Contrast [[id number]] Collected: 04/06/22 0219     Updated: 04/06/22 0230    Narrative:      CT OF THE LEFT LOWER EXTREMITY     HISTORY: Left leg weakness     COMPARISON: 04/05/2022      TECHNIQUE: Axial CT imaging was obtained through the left lower  extremity. Coronal and sagittal reformatted images were obtained.     FINDINGS:  No acute fracture or subluxation of the left knee is seen. There is  diffuse left lower extremity edema. There is a trace amount of patellar  fluid. There is really no significant stranding within the infrapatellar  bursa. Both the patellar and quadriceps tendon appear continuous. Dense  vascular calcifications are noted. The edema appears to be more  significant on the lateral aspect of the leg.       Impression:         1. No acute osseous findings.  2. Both the patellar tendon and quadriceps tendon appear to be intact.  If concern persists for soft tissue injury, consideration for MRI is  suggested.  3. Diffuse left lower extremity edema.     Radiation dose reduction techniques were utilized, including automated  exposure control and exposure modulation based on body size.     This report was finalized on 4/6/2022 2:27 AM by Dr. Yue Frye M.D.       CT Head Without Contrast [[id number]] Collected: 04/06/22 0214     Updated: 04/06/22 0221    Narrative:      CT HEAD WITHOUT CONTRAST     HISTORY: Left knee weakness     COMPARISON: 10/23/2021     TECHNIQUE: Axial CT imaging was obtained through the brain. No IV  contrast was administered.     FINDINGS:  Images through the posterior fossa are degraded by streak artifact from  dental amalgam. No acute intracranial hemorrhage is seen. There is  diffuse atrophy. There is periventricular and deep white matter  microangiopathic change. No calvarial fracture is seen. Paranasal  sinuses and mastoid air cells appear clear.       Impression:      No acute intracranial findings.     Radiation dose reduction techniques were utilized, including automated  exposure control and exposure modulation based on body size.     This report was finalized on 4/6/2022 2:18 AM by Dr. Yue Frye M.D.       XR Knee 1 or 2 View Left  [[id number]] Collected: 04/05/22 2053     Updated: 04/05/22 2057    Narrative:      XR KNEE 1 OR 2 VW LEFT-     Clinical: Left knee pain     FINDINGS: Medial and lateral compartments preserved. There is  patellofemoral joint narrowing. Vascular arterial calcifications within  the soft tissues. No bone lesion, osteochondral defect, fracture or  dislocation seen.     CONCLUSION: Patellofemoral joint degeneration. No acute osseous or  articular abnormality.     This report was finalized on 4/5/2022 8:54 PM by Dr. Adrian Brand M.D.             Results for orders placed during the hospital encounter of 10/23/21    Adult transthoracic echo complete    Interpretation Summary  · Estimated left ventricular EF = 60% Left ventricular systolic function is normal.  · Left ventricular diastolic function was indeterminate.  · The right ventricular cavity is mild to moderately dilated.  · Left atrial volume is moderately increased.  · The right atrial cavity is moderately dilated.  · There is a mechanical aortic valve present. The aortic valve peak and mean gradients are within defined limits.  · Moderate tricuspid valve regurgitation is present.  · Severe pulmonary hypertension is present. Calculated right ventricular systolic pressure from tricuspid regurgitation is 63.7 mmHg.      ECG 12 Lead   Preliminary Result   HEART RATE= 90  bpm   RR Interval= 666  ms   ID Interval=   ms   P Horizontal Axis=   deg   P Front Axis=   deg   QRSD Interval= 126  ms   QT Interval= 391  ms   QRS Axis= -43  deg   T Wave Axis= 90  deg   - ABNORMAL ECG -   Atrial fibrillation   Nonspecific IVCD with LAD   LVH with secondary repolarization abnormality   Anterior Q waves, possibly due to LVH   Electronically Signed By:    Date and Time of Study: 2022-04-05 23:15:46           Assessment/Plan     Active Hospital Problems    Diagnosis  POA   • **Hematoma of iliopsoas muscle, left, initial encounter [S70.12XA]  Yes   • History of CVA (cerebrovascular  accident) [Z86.73]  Not Applicable   • S/P laparoscopic cholecystectomy [Z90.49]  Not Applicable   • Anemia [D64.9]  Yes   • Chronic anticoagulation [Z79.01]  Not Applicable   • Stage 3b chronic kidney disease (HCC) [N18.32]  Yes   • H/O heart valve replacement with mechanical valve [Z95.2]  Not Applicable   • Type 2 diabetes mellitus (HCC) [E11.9]  Yes   • Hypertension [I10]  Yes   • Atrial fibrillation (HCC) [I48.91]  Yes      Resolved Hospital Problems   No resolved problems to display.       Mr. Sequeira is a 86 y.o. male former smoker with a history of valvular heart disease, afib, chronic AC, CKD, DM, HTN, NICM, and multiple medical issues who is admitted for lt iliopsoas muscle hematoma on AC    -Ortho & Cardiology consult  -Hold AC for now. Serial H&H. Hgb has been trending around 9 on recent labs. Transfuse as needed  -HR controlled. Monitor on telemetry  -S/P lap cholecystectomy. No apparent complications  -Monitor BG trends. Correctional scale insulin. A1C 6.7%. Resume home regimen when appropriate  -PT eval  -Further recommendations based on hospital course      I discussed the patient's findings and my recommendations with patient, family and nursing staff.    VTE Prophylaxis - SCD RLE; anticoagulation on hold for now.  Code Status - Full code.       LIZA Saul  [facility]  04/06/22  12:00 EDT

## 2022-04-06 NOTE — CONSULTS
Patient Name: Francisco Sequeira  :1935  86 y.o.    Date of Admission: 2022  Date of Consultation:  22  Encounter Provider: LIZA Krishnamurthy  Place of Service: [facility] CARDIOLOGY  Referring Provider: LIZA Goodwin  Patient Care Team:  Rubin Hinkle APRN as PCP - General (Family Medicine)  Audra Vazquez RPH as Pharmacist  Vasquez Niño PharmD as Pharmacist (Pharmacy)  Tatiana Tinoco MD as Consulting Physician (Gastroenterology)      Chief complaint: left leg swelling    Reason for Consult: AC recs    History of Present Illness: Mr. Sequeira is an 86 year old patient of Dr. Fried. He has a mechanical aortic valve replacement and suffered a stroke with an INR of 2.2. He is on chronic warfarin with a higher INR target due to this of 3-3.5. He has atrial fibrillation as well as hypertension, hyperlipidemia, diabetes, ascending aortic aneurysm. He had 2 GI bleeds in .    He was just admitted eaer this month and discharged on 22 after undergoing a laparoscpic choleystectomy 3/29/22.  His course was complicated by aspiration pneumonia, as well as recurrent pleural effusions. He had a left thoracentesis on 3/30 with 1700 mL drained. He was restarted on full dose Lovenox and Coumadin bridge at discharge.     He presented back to [facility] ED 22 with reports of acute onset left left swelling and associated difficulty ambulating. He was sitting on the toilet and couldn't get up. ED workup was notable for a left iliopsoas muscle hematoma, for which he was admitted and pending further orthopedic evaluation.     Anticoagulation has been held.     He is not short of breath. No PND, orthopnea. No chest pain or pressure.    Previous Cardiac Testing:    Echocardiogram 10/24/21  Estimated left ventricular EF = 60% Left ventricular systolic function is normal.  Left ventricular diastolic function was indeterminate.  The right ventricular cavity  is mild to moderately dilated.  Left atrial volume is moderately increased.  The right atrial cavity is moderately dilated.  There is a mechanical aortic valve present. The aortic valve peak and mean gradients are within defined limits.  Moderate tricuspid valve regurgitation is present.  Severe pulmonary hypertension is present. Calculated right ventricular systolic pressure from tricuspid regurgitation is 63.7 mmHg.    Stress Test 6/1/17  Left ventricular ejection fraction is normal (Calculated EF = 65%).  Myocardial perfusion imaging indicates a normal myocardial perfusion study with no evidence of ischemia.  Impressions are consistent with a low risk study.      Past Medical History:   Diagnosis Date    Allergic rhinitis     Aortic valve insufficiency     Ascending aortic aneurysm (HCC)     Atrial fibrillation (HCC)     Bacteremia     Calcific aortic stenosis of bicuspid valve     Cardiac arrest (HCC)     Cardiomyopathy (HCC)     CKD (chronic kidney disease) stage 3, GFR 30-59 ml/min (HCC)     Contact dermatitis due to poison ivy     Elbow fracture     Esophageal reflux     GERD (gastroesophageal reflux disease)     Head injury     History of transfusion     Hyperglycemia     Hyperlipidemia     Hypertension     Kidney carcinoma (HCC)     Nonischemic cardiomyopathy (HCC)     Renal oncocytoma     Seasonal allergic reaction     Sinusitis     Syncope     Type 2 diabetes mellitus (HCC)     uncontrolled    Visual field defect        Past Surgical History:   Procedure Laterality Date    AORTIC VALVE REPAIR/REPLACEMENT      ASCENDING AORTIC ANEURYSM REPAIR W/ MECHANICAL AORTIC VALVE REPLACEMENT      CHOLECYSTECTOMY WITH INTRAOPERATIVE CHOLANGIOGRAM N/A 3/29/2022    Procedure: Laparoscopic cholecystectomy with cholangiogram, possible open;  Surgeon: Lisa Guidry MD;  Location: [facility];  Service: General;  Laterality: N/A;    COLONOSCOPY N/A 10/28/2021    Procedure: COLONOSCOPY to cecum:  cold snare polyps,;   Surgeon: Dinesh Meza MD;  Location:  [facility];  Service: Gastroenterology;  Laterality: N/A;  pre:  Iron deficiency anemia  post:  polyps, diverticulosis,     COLONOSCOPY N/A 11/9/2021    Procedure: COLONOSCOPY to cecum with APC cautery of AVM and clip placement x1;  Surgeon: Pavan Rodriguez MD;  Location:  [facility];  Service: Gastroenterology;  Laterality: N/A;  PRE - gi bleed, anemia  POST - diverticulosis, poor prep, AVM right colon    ENDOSCOPY N/A 10/28/2021    Procedure: ESOPHAGOGASTRODUODENOSCOPY with biopsies;  Surgeon: Dinesh Meza MD;  Location:  [facility];  Service: Gastroenterology;  Laterality: N/A;  pre:  Iron deficiency anemia  post:  duodenitis and gastritis    EYE SURGERY  12/09/2020    cataract surgery     NEPHRECTOMY      OTHER SURGICAL HISTORY      elbow surgery    OTHER SURGICAL HISTORY      right arm surgery    PROSTATE SURGERY      THORACENTESIS Left     diagnostic         Prior to Admission medications    Medication Sig Start Date End Date Taking? Authorizing Provider   aspirin 81 MG EC tablet Take 1 tablet by mouth Daily. 12/23/21  Yes Marilee Finn APRN   atorvastatin (LIPITOR) 40 MG tablet Take 40 mg by mouth Daily.   Yes Provider, MD Tristen   digoxin (LANOXIN) 125 MCG tablet TAKE ONE TABLET BY MOUTH DAILY  Patient taking differently: Take 62.5 mcg by mouth Daily. 12/27/21  Yes Harika Alanis APRN   diphenhydrAMINE (BENADRYL) 25 mg capsule Take 1 capsule by mouth 2 (Two) Times a Day As Needed for Itching, Allergies or Sleep. 11/16/21  Yes Barbra Patino APRN   enoxaparin (LOVENOX) 80 MG/0.8ML solution syringe Discard 0.1 ml and Inject 0.7 mL under the skin into the appropriate area as directed Every 12 (Twelve) Hours. Continue until INR is therapeutic. 4/4/22  Yes Robb Baldwin MD   famotidine (PEPCID) 20 MG tablet Take 1 tablet by mouth 2 (Two) Times a Day Before Meals for 30 days. 4/4/22 5/4/22 Yes Robb Baldwin  MD ITALO   ferrous gluconate (FERGON) 324 MG tablet Take 1 tablet by mouth Every Other Day. 11/30/21  Yes Rubin Hinkle APRN   furosemide (Lasix) 40 MG tablet Take 1 tablet by mouth Daily. 12/7/21  Yes Rubin Hinkle APRN   Insulin Degludec (TRESIBA FLEXTOUCH SC) Inject 30-65 Units under the skin into the appropriate area as directed Daily As Needed. Use as directed if blood sugar gets too high   Yes ProviderTristen MD   Insulin Disposable Pump (V-Go 40) kit USE AS DIRECTED THREE TIMES A DAY 7/8/21  Yes Rubin Hinkle APRN   insulin lispro (humaLOG) 100 UNIT/ML injection USE AS DIRECTED WITH V-GO PUMP 9/14/21  Yes Rubin Hinkle APRN   lactobacillus acidophilus (RISAQUAD) capsule capsule Take 1 capsule by mouth Daily for 30 days. 4/4/22 5/4/22 Yes Robb Baldwin MD   magnesium oxide (MAG-OX) 400 MG tablet Take 400 mg by mouth 2 (Two) Times a Day.   Yes ProviderTristen MD   metoprolol succinate XL (TOPROL-XL) 25 MG 24 hr tablet Take 0.5 tablets by mouth Daily for 30 days. 3/16/22 4/15/22 Yes Harika Alanis APRN   Omega-3 Fatty Acids (FISH OIL) 1000 MG capsule capsule Take 4,000 mg by mouth Daily With Breakfast.   Yes ProviderTristen MD   saccharomyces boulardii (FLORASTOR) 250 MG capsule Take 1 capsule by mouth 2 (Two) Times a Day for 30 days. 4/4/22 5/4/22 Yes Robb Baldwin MD   vancomycin (VANCOCIN) 125 MG capsule Take 1 capsule by mouth Every Other Day for 7 doses. 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 4/4/22 4/17/22 Yes Robb Baldwin MD   warfarin (COUMADIN) 5 MG tablet Take 1.5 tablets by mouth Every Night. 4/4/22  Yes Robb Baldwin MD       Allergies   Allergen Reactions    Other Other (See Comments)     Grass, ragweed    Penicillins Swelling    Percocet [Oxycodone-Acetaminophen] Nausea And Vomiting       Social History     Socioeconomic History    Marital status:    Tobacco Use    Smoking status: Former Smoker    Smokeless  tobacco: Former User    Tobacco comment: caffeine use   Vaping Use    Vaping Use: Never used   Substance and Sexual Activity    Alcohol use: Yes     Comment: occasional    Drug use: No    Sexual activity: Defer       Family History   Problem Relation Age of Onset    Cancer Mother         colon    Cancer Brother         colon       REVIEW OF SYSTEMS:   All systems reviewed.  Pertinent positives identified in HPI.  All other systems are negative.      Objective:     Vitals:    04/06/22 0730 04/06/22 1210 04/06/22 1215 04/06/22 1522   BP: 138/79 119/67 117/67 143/69   BP Location: Right arm  Right arm Right arm   Patient Position: Lying  Lying Lying   Pulse: 88  78    Resp: 16  16 14   Temp:   97.5 °F (36.4 °C) 98 °F (36.7 °C)   TempSrc:   Oral Oral   SpO2:   100% 96%   Weight:       Height:         Body mass index is 21.16 kg/m².    General Appearance:    Alert, cooperative, in no acute distress   Head:    Normocephalic, without obvious abnormality, atraumatic   Eyes:            Lids and lashes normal, conjunctivae and sclerae normal, no icterus, no pallor, corneas clear, PERRLA   Ears:    Ears appear intact with no abnormalities noted   Throat:   No oral lesions, no thrush, oral mucosa moist   Neck:   No  JVD   Back:     No kyphosis present, no scoliosis present, no skin lesions, erythema or scars, no tenderness to percussion or palpation, range of motion normal   Lungs:     Clear to auscultation, respirations regular, even and unlabored    Heart:    irregular rhythm and normal rate, normal S1 and S2, no murmur, no gallop, no rub, + click   Chest Wall:    No abnormalities observed   Abdomen:     Normal bowel sounds, no masses, no organomegaly, soft, nontender, nondistended, no guarding, no rebound  tenderness   Extremities:   Moves all extremities well, no edema, no cyanosis, no redness   Pulses:   Pulses palpable and equal bilaterally. Normal radial, carotid, femoral, dorsalis pedis and posterior tibial pulses  bilaterally. Normal abdominal aorta   Skin:  Psychiatric:   No bleeding, bruising or rash    Alert and oriented x 3, normal mood and affect   Lab Review:     Results from last 7 days   Lab Units 04/06/22  0951 04/06/22  0002   SODIUM mmol/L 138 138   POTASSIUM mmol/L 4.2 4.1   CHLORIDE mmol/L 101 100   CO2 mmol/L 32.0* 29.6*   BUN mg/dL 11 11   CREATININE mg/dL 1.14 1.09   CALCIUM mg/dL 8.1* 8.5*   BILIRUBIN mg/dL  --  0.6   ALK PHOS U/L  --  131*   ALT (SGPT) U/L  --  11   AST (SGOT) U/L  --  14   GLUCOSE mg/dL 161* 184*     Results from last 7 days   Lab Units 04/06/22  0002   TROPONIN T ng/mL 0.030     Results from last 7 days   Lab Units 04/06/22  0951   WBC 10*3/mm3 7.40   HEMOGLOBIN g/dL 7.8*  7.8*   HEMATOCRIT % 25.4*  25.4*   PLATELETS 10*3/mm3 213     Results from last 7 days   Lab Units 04/05/22  2304 04/05/22  0000 04/04/22  0711   INR  1.20* 1.30 1.10                     EKG 4/5/22    Previous EKG 3/30/22          Assessment and Plan:   1. Left illiopsoas hematoma secondary to anticoagualtion - spontaneous. No reported trauma. Ortho saw. rec heat/ice. No surgical indication  2. Mechanical aortic valve  3. Permanent atrial fibrillation, rate control on metoprolol and digoxin  4. Prior stroke with INR 2.2  5. Recent acute cholecystitis status post lap teja 3/2/22  6. Chronic anticoagulation with warfarin. Followed by med management clinic.  7. 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 Chronic diastolic heart failure  9. Pleural effusion , repeat thoracentesis x 3 (10/25/21, 11/11/21, 3/30/22)    Holding anticoagulation. Unfortunately he is high risk for embolic event given his prior stroke in the setting of INR 2.2. will follow closely.     Iza Mckeon, LIZA  04/06/22  16:41 EDT

## 2022-04-06 NOTE — OUTREACH NOTE
General Surgery Week 2 Survey    Flowsheet Row Responses   [facility] patient discharged from? [place]   Does the patient have one of the following disease processes/diagnoses(primary or secondary)? General Surgery   Week 2 attempt successful? No   Revoke Readmitted          RADHA PATRICIA - Registered Nurse

## 2022-04-06 NOTE — ED PROVIDER NOTES
EMERGENCY DEPARTMENT ENCOUNTER    Room Number:  23/23  Date of encounter:  4/6/2022  PCP: Rubin Hinkle APRN  Historian: Patient      HPI:  Chief Complaint: Leg swelling and difficulty walking      Context: Francisco Sequeira is a 86 y.o. male who presents to the ED c/o acute onset of left leg swelling and trouble walking at this afternoon.  The patient was just discharged from here yesterday after nearly a 2-week stay for cholecystectomy, aspiration pneumonia and pleural effusion.  Of note is that the patient has history of A. fib and mechanical heart valve.  He was maintained on Lovenox and now is back on his Coumadin.  They state his INR was 1.3 this morning.  They state he took his morning Lovenox dose.  The patient and family states that he was walking this morning and went to the bathroom and after sitting on the toilet for several minutes when he tried to stand up he felt weak in his left leg and noticed left leg swelling and states that he cannot walk on his left leg.  He states his left leg will do what he tells it to do but also complains of left knee pain.  He denies any falls or trauma.  He denies any headache.  He denies any focal neurologic complaints.  He denies chest pain or shortness of breath.      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 (Formerly [facility]) 04/09/2020   • Iron deficiency anemia    • Chronic anticoagulation    • Hemiparesis of left nondominant side as  late effect of cerebral infarction (HCC) 11/02/2021   • Rectus sheath hematoma 11/05/2021   • Sepsis (HCC) 03/23/2022   • Calculus of gallbladder with acute cholecystitis without obstruction 03/23/2022   • History of Clostridium difficile infection 03/23/2022   • Aspiration pneumonitis (HCC) 03/23/2022     Resolved Ambulatory Problems     Diagnosis Date Noted   • Cardiomyopathy (Formerly [facility])    • Uncontrolled type 2 diabetes mellitus 03/21/2016   • Poison ivy 09/06/2016   • Low testosterone 04/07/2017   • Medicare annual wellness visit, subsequent 10/30/2017   • Sinusitis 05/01/2018   • Hx of mitral valve replacement with mechanical valve [Z95.2] 09/19/2018   • Screening for iron deficiency anemia 11/01/2018   • Stroke-like symptom 01/13/2020   • Acute cerebral infarction (HCC) 01/14/2020   • Left-sided weakness 10/24/2021   • Pleural effusion on right 10/24/2021   • Lower GI bleed 11/02/2021   • History of nephrectomy 11/02/2021   • Abnormal urinalysis 11/02/2021   • Pleural effusion 11/02/2021   • Acute posthemorrhagic anemia 11/05/2021   • Angiodysplasia of colon without hemorrhage 11/10/2021   • Hospital discharge follow-up 11/19/2021   • C. difficile colitis 02/15/2022     Past Medical History:   Diagnosis Date   • Allergic rhinitis    • Aortic valve insufficiency    • Ascending aortic aneurysm (HCC)    • Bacteremia    • Calcific aortic stenosis of bicuspid valve    • Cardiac arrest ([facility])    • CKD (chronic kidney disease) stage 3, GFR 30-59 ml/min (Former[facility])    • Contact dermatitis due to poison ivy    • Elbow fracture    • Esophageal reflux    • GERD (gastroesophageal reflux disease)    • Head injury    • History of transfusion    • Hyperglycemia    • Nonischemic cardiomyopathy (HCC)    • Renal oncocytoma    • Seasonal allergic reaction    • Syncope    • Visual field defect          PAST SURGICAL HISTORY  Past Surgical History:   Procedure Laterality Date   • AORTIC VALVE REPAIR/REPLACEMENT     • ASCENDING AORTIC ANEURYSM  REPAIR W/ MECHANICAL AORTIC VALVE REPLACEMENT     • CHOLECYSTECTOMY WITH INTRAOPERATIVE CHOLANGIOGRAM N/A 3/29/2022    Procedure: Laparoscopic cholecystectomy with cholangiogram, possible open;  Surgeon: Lisa Guidry MD;  Location: [facility] MAIN OR;  Service: General;  Laterality: N/A;   • COLONOSCOPY N/A 10/28/2021    Procedure: COLONOSCOPY to cecum:  cold snare polyps,;  Surgeon: Dinesh Meza MD;  Location: [facility] ENDOSCOPY;  Service: Gastroenterology;  Laterality: N/A;  pre:  Iron deficiency anemia  post:  polyps, diverticulosis,    • COLONOSCOPY N/A 11/9/2021    Procedure: COLONOSCOPY to cecum with APC cautery of AVM and clip placement x1;  Surgeon: Pavan Rodriguez MD;  Location: [facility] ENDOSCOPY;  Service: Gastroenterology;  Laterality: N/A;  PRE - gi bleed, anemia  POST - diverticulosis, poor prep, AVM right colon   • ENDOSCOPY N/A 10/28/2021    Procedure: ESOPHAGOGASTRODUODENOSCOPY with biopsies;  Surgeon: Dinesh Meza MD;  Location: [facility] ENDOSCOPY;  Service: Gastroenterology;  Laterality: N/A;  pre:  Iron deficiency anemia  post:  duodenitis and gastritis   • EYE SURGERY  12/09/2020    cataract surgery    • NEPHRECTOMY     • OTHER SURGICAL HISTORY      elbow surgery   • OTHER SURGICAL HISTORY      right arm surgery   • PROSTATE SURGERY     • THORACENTESIS Left     diagnostic         FAMILY HISTORY  Family History   Problem Relation Age of Onset   • Cancer Mother         colon   • Cancer Brother         colon         SOCIAL HISTORY  Social History     Socioeconomic History   • Marital status:    Tobacco Use   • Smoking status: Former Smoker   • Smokeless tobacco: Former User   • Tobacco comment: caffeine use   Vaping Use   • Vaping Use: Never used   Substance and Sexual Activity   • Alcohol use: Yes     Comment: occasional   • Drug use: No   • Sexual activity: Defer         ALLERGIES  Other, Penicillins, and Percocet [oxycodone-acetaminophen]        REVIEW OF  SYSTEMS  Review of Systems       All systems reviewed and negative except for those discussed in HPI.     PHYSICAL EXAM    I have reviewed the triage vital signs and nursing notes.    ED Triage Vitals   Temp Heart Rate Resp BP SpO2   04/05/22 1733 04/05/22 1733 04/05/22 1745 04/05/22 1733 04/05/22 1733   97.2 °F (36.2 °C) 84 16 103/57 98 %      Temp src Heart Rate Source Patient Position BP Location FiO2 (%)   04/05/22 1733 -- -- -- --   Tympanic           GENERAL: 86-year-old well developed, well nourished in no acute distress  HENT: NCAT, neck supple, trachea midline  EYES: no scleral icterus, PERRL, normal conjunctivae  CV: regular rhythm, regular rate, 3/6 murmur  RESPIRATORY: unlabored effort, CTAB  ABDOMEN: soft, nontender, nondistended, bowel sounds present  MUSCULOSKELETAL: no gross deformity, 1+ pedal edema on the right and 2+ pedal edema on the left, no calf tenderness: The patient is unable to lift his left leg off the bed he states he is tender to move but will not.  He is able to wiggle his leg side to side and plantarflex and dorsiflex his foot.  He does have some knee pain and possible deficit over his patellar tendon but is nontender.  The patient has cool extremities bilaterally with cap refill of 4 but bilateral dopplerable pulses.   NEURO: alert and oriented x3 with a normal neuro exam except that he is unable to lift his left lower leg off of the bed.  SKIN: warm, dry, no rash  PSYCH:  Appropriate mood and affect      PPE  Pt does not present with symptoms for COVID19; however, I was wearing a N95 mask and goggles throughout all patient interaction.    Vital signs and nursing notes reviewed.      LAB RESULTS  Recent Results (from the past 24 hour(s))   Duplex Venous Lower Extremity LEFT    Collection Time: 04/05/22  7:20 PM   Result Value Ref Range    Target HR (85%) 116 bpm    Max. Pred. HR (100%) 136 bpm    Right Common Femoral Spont Y     Right Common Femoral Phasic Y     Right Common Femoral  Augment Y     Right Common Femoral Competent Y     Right Common Femoral Compress C     Left Common Femoral Spont Y     Left Common Femoral Phasic Y     Left Common Femoral Augment Y     Left Common Femoral Competent Y     Left Common Femoral Compress C     Left Saphenofemoral Junction Compress C     Left Profunda Femoral Compress C     Left Proximal Femoral Compress C     Left Mid Femoral Spont Y     Left Mid Femoral Phasic Y     Left Mid Femoral Augment Y     Left Mid Femoral Competent Y     Left Mid Femoral Compress C     Left Distal Femoral Compress C     Left Popliteal Spont Y     Left Popliteal Phasic Y     Left Popliteal Augment Y     Left Popliteal Competent Y     Left Popliteal Compress C     Left Posterior Tibial Compress C     Left Peroneal Compress C     Left Gastronemius Compress C     Left Greater Saph AK Compress C     Left Greater Saph BK Compress C     Left Lesser Saph Compress C    Lavender Top    Collection Time: 04/05/22 11:04 PM   Result Value Ref Range    Extra Tube hold for add-on    Light Blue Top    Collection Time: 04/05/22 11:04 PM   Result Value Ref Range    Extra Tube hold for add-on    CBC Auto Differential    Collection Time: 04/05/22 11:04 PM    Specimen: Blood   Result Value Ref Range    WBC 7.51 3.40 - 10.80 10*3/mm3    RBC 3.29 (L) 4.14 - 5.80 10*6/mm3    Hemoglobin 8.6 (L) 13.0 - 17.7 g/dL    Hematocrit 27.2 (L) 37.5 - 51.0 %    MCV 82.7 79.0 - 97.0 fL    MCH 26.1 (L) 26.6 - 33.0 pg    MCHC 31.6 31.5 - 35.7 g/dL    RDW 17.0 (H) 12.3 - 15.4 %    RDW-SD 51.1 37.0 - 54.0 fl    MPV 12.4 (H) 6.0 - 12.0 fL    Platelets 242 140 - 450 10*3/mm3    Neutrophil % 83.1 (H) 42.7 - 76.0 %    Lymphocyte % 9.5 (L) 19.6 - 45.3 %    Monocyte % 6.0 5.0 - 12.0 %    Eosinophil % 0.7 0.3 - 6.2 %    Basophil % 0.3 0.0 - 1.5 %    Immature Grans % 0.4 0.0 - 0.5 %    Neutrophils, Absolute 6.25 1.70 - 7.00 10*3/mm3    Lymphocytes, Absolute 0.71 0.70 - 3.10 10*3/mm3    Monocytes, Absolute 0.45 0.10 - 0.90  10*3/mm3    Eosinophils, Absolute 0.05 0.00 - 0.40 10*3/mm3    Basophils, Absolute 0.02 0.00 - 0.20 10*3/mm3    Immature Grans, Absolute 0.03 0.00 - 0.05 10*3/mm3    nRBC 0.0 0.0 - 0.2 /100 WBC   Protime-INR    Collection Time: 04/05/22 11:04 PM    Specimen: Blood   Result Value Ref Range    Protime 15.1 (H) 11.7 - 14.2 Seconds    INR 1.20 (H) 0.90 - 1.10   ECG 12 Lead    Collection Time: 04/05/22 11:15 PM   Result Value Ref Range    QT Interval 391 ms   Comprehensive Metabolic Panel    Collection Time: 04/06/22 12:02 AM    Specimen: Blood   Result Value Ref Range    Glucose 184 (H) 65 - 99 mg/dL    BUN 11 8 - 23 mg/dL    Creatinine 1.09 0.76 - 1.27 mg/dL    Sodium 138 136 - 145 mmol/L    Potassium 4.1 3.5 - 5.2 mmol/L    Chloride 100 98 - 107 mmol/L    CO2 29.6 (H) 22.0 - 29.0 mmol/L    Calcium 8.5 (L) 8.6 - 10.5 mg/dL    Total Protein 6.3 6.0 - 8.5 g/dL    Albumin 2.70 (L) 3.50 - 5.20 g/dL    ALT (SGPT) 11 1 - 41 U/L    AST (SGOT) 14 1 - 40 U/L    Alkaline Phosphatase 131 (H) 39 - 117 U/L    Total Bilirubin 0.6 0.0 - 1.2 mg/dL    Globulin 3.6 gm/dL    A/G Ratio 0.8 g/dL    BUN/Creatinine Ratio 10.1 7.0 - 25.0    Anion Gap 8.4 5.0 - 15.0 mmol/L    eGFR 66.9 >60.0 mL/min/1.73   Green Top (Gel)    Collection Time: 04/06/22 12:02 AM   Result Value Ref Range    Extra Tube Hold for add-ons.    Troponin    Collection Time: 04/06/22 12:02 AM    Specimen: Blood   Result Value Ref Range    Troponin T 0.030 0.000 - 0.030 ng/mL       Ordered the above labs and independently reviewed the results.        RADIOLOGY  XR Knee 1 or 2 View Left    Result Date: 4/5/2022  XR KNEE 1 OR 2 VW LEFT-  Clinical: Left knee pain  FINDINGS: Medial and lateral compartments preserved. There is patellofemoral joint narrowing. Vascular arterial calcifications within the soft tissues. No bone lesion, osteochondral defect, fracture or dislocation seen.  CONCLUSION: Patellofemoral joint degeneration. No acute osseous or articular abnormality.  This  report was finalized on 4/5/2022 8:54 PM by Dr. Adrian Brand M.D.      CT Head Without Contrast    Result Date: 4/6/2022  CT HEAD WITHOUT CONTRAST  HISTORY: Left knee weakness  COMPARISON: 10/23/2021  TECHNIQUE: Axial CT imaging was obtained through the brain. No IV contrast was administered.  FINDINGS: Images through the posterior fossa are degraded by streak artifact from dental amalgam. No acute intracranial hemorrhage is seen. There is diffuse atrophy. There is periventricular and deep white matter microangiopathic change. No calvarial fracture is seen. Paranasal sinuses and mastoid air cells appear clear.      No acute intracranial findings.  Radiation dose reduction techniques were utilized, including automated exposure control and exposure modulation based on body size.  This report was finalized on 4/6/2022 2:18 AM by Dr. Yue Frye M.D.      CT Lumbar Spine Without Contrast    Result Date: 4/6/2022  CT OF THE LUMBAR SPINE  HISTORY: Left lower extremity weakness  COMPARISON: 03/23/2022  TECHNIQUE: Axial CT imaging was obtained through the lumbar spine. Coronal and sagittal reformatted images were obtained.  FINDINGS: No acute fracture or subluxation of the lumbar spine is identified. Intervertebral disc spaces appear relatively well-maintained. Lumbar vertebral body alignment appears within normal limits.  T12-L1: There is no canal stenosis or neural foraminal narrowing. L1-L2: There is broad-based disc bulge. This results in narrowing of the canal, as well as bilateral neural foraminal narrowing, right greater than left. L2-L3: There is broad-based disc bulge. This results in canal narrowing, as well as neural foraminal narrowing, right greater than left. L3-L4: Disc bulge results in canal stenosis. There is bilateral neural foraminal narrowing. L4-L5: There is broad-based disc bulge. There is canal stenosis. There is bilateral neural foraminal narrowing, left greater than right. L5-S1: There is no  canal stenosis or neural foraminal narrowing.   This patient has stranding which is seen posterior to the left psoas muscle, which is new when compared to prior exam. There is also asymmetric enlargement of the patient's left iliacus muscle, with displacement of the left psoas muscle anteriorly. Appearance is concerning for hematoma, given history of anticoagulation, although this is incompletely assessed on this exam. Centrally, there is more focal area of decreased attenuation. This could reflect some liquefying hematoma, but correlation with any evidence of infection is recommended. There is additional fluid and stranding which is seen within the left perivesical space. There is some free fluid within the pelvis within the right hemipelvis. The patient is noted to have a partially visualized moderate right pleural effusion. There also is some trace pleural fluid on the left.       1. No acute osseous findings. 2. Degenerative changes in the spine, as noted above. 3. Asymmetric enlargement of the patient's left iliacus and left iliopsoas muscle, incompletely assessed on this exam. Given history of anticoagulation, this may represent hemorrhage. There are areas of more central low attenuation, which could reflect hematoma liquefaction, but correlation with any evidence of infection is recommended. The hemorrhage does abut the patient's left psoas muscle as well, and there is also some stranding and fluid seen within the left perivesical space.  FINDINGS were discussed with Dr. Dubose at 2:40 AM.  Radiation dose reduction techniques were utilized, including automated exposure control and exposure modulation based on body size.  This report was finalized on 4/6/2022 2:43 AM by Dr. Yue Frye M.D.      CT Lower Extremity Left Without Contrast    Result Date: 4/6/2022  CT OF THE LEFT LOWER EXTREMITY  HISTORY: Left leg weakness  COMPARISON: 04/05/2022  TECHNIQUE: Axial CT imaging was obtained through the left  lower extremity. Coronal and sagittal reformatted images were obtained.  FINDINGS: No acute fracture or subluxation of the left knee is seen. There is diffuse left lower extremity edema. There is a trace amount of patellar fluid. There is really no significant stranding within the infrapatellar bursa. Both the patellar and quadriceps tendon appear continuous. Dense vascular calcifications are noted. The edema appears to be more significant on the lateral aspect of the leg.       1. No acute osseous findings. 2. Both the patellar tendon and quadriceps tendon appear to be intact. If concern persists for soft tissue injury, consideration for MRI is suggested. 3. Diffuse left lower extremity edema.  Radiation dose reduction techniques were utilized, including automated exposure control and exposure modulation based on body size.  This report was finalized on 4/6/2022 2:27 AM by Dr. Yue Frye M.D.        I ordered the above noted radiological studies. Independently reviewed by me and discussed with radiologist.  See dictation above for official radiology interpretation.      PROCEDURES    Procedures        MEDICATIONS GIVEN IN ER    Medications   sodium chloride 0.9 % flush 10 mL (has no administration in time range)   sodium chloride 0.9 % flush 10 mL (has no administration in time range)   ondansetron (ZOFRAN) injection 4 mg (4 mg Intravenous Given 4/6/22 0135)         PROGRESS, DATA ANALYSIS, CONSULTS, AND MEDICAL DECISION MAKING    All labs have been independently reviewed by me.  All radiology studies have been reviewed by me and discussed with radiologist dictating report.   EKG's independently reviewed by me.  Discussion below represents my analysis of pertinent findings related to patient's condition, differential diagnosis, treatment plan and final disposition.      ED Course as of 04/06/22 0318   Tue Apr 05, 2022 2238 Patient's left lower extremity venous Doppler is negative. [GP]   2239 The patient's  knee x-ray shows arthritis but otherwise negative acute [GP]   2254 However the patient is still unable to lift his leg off the bed.  He does have dopplerable pulses so do not believe he had an acute embolism to his left leg.  I will obtain a CT of the head, lumbar spine and knee for further evaluation.  Of note is that the patient sat in triage for 5 hours because the hospital is full and we are holding people in the emergency room. [GP]   2329 EKG    EKG time: 2315  Rhythm/Rate: A. fib at 70  LVH  No Acute Ischemia  Non-Specific ST-T changes    Unchanged compared to prior on 3/30/2022    Interpreted Contemporaneously by me.  Independently viewed by me     [GP]   Wed Apr 06, 2022   0236 CT head and CT knee are unremarkable. [GP]   0239 I just discussed the patient's CT lumbar spine with Dr. Frye from radiology.  The patient has a hematoma in his left iliac us and iliopsoas muscles likely due to him being on the Coumadin and Lovenox.  However the patient does have a history of a mechanical valve.  Thus he will need to be admitted to the hospital for further evaluation and care. [GP]   0250 I just advised the patient of his hematoma.  His hemoglobin is dropped from 9.1-8.6 over 2 days.  Advised him of this is a difficult situation since he has a mechanical aortic valve and needs to be anticoagulated.  I advised him we will admit him to the hospital for further evaluation care.  He understands and agrees with the plan. [GP]   0317 I discussed the case with Carmen Welch from [facility].  She is aware of the patient's hematoma, mechanical aortic valve, chronic anticoagulation and anemia.  She will admit the patient to telemetry bed under Dr. Heath's service for further evaluation and care. [GP]      ED Course User Index  [GP] Julian Dubose MD             AS OF 03:18 EDT VITALS:    BP - 150/77  HR - 84  TEMP - 97.2 °F (36.2 °C) (Tympanic)  02 SATS - 100%        DIAGNOSIS  Final diagnoses:   Hematoma of iliopsoas  muscle, left, initial encounter   Chronic anticoagulation   H/O heart valve replacement with mechanical valve   Immobility syndrome   Anemia, unspecified type   History of atrial fibrillation         DISPOSITION  ADMISSION    Discussed treatment plan and reason for admission with pt/family and admitting physician.  Pt/family voiced understanding of the plan for admission for further testing/treatment as needed.        EMR Dragon/Transcription disclaimer:   Much of this encounter note is an electronic transcription/translation of spoken language to printed text.        Julian Dubose MD  04/06/22 0312

## 2022-04-06 NOTE — PAYOR COMM NOTE
"Laura Xie (86 y.o. Male)     ATTN: INITIAL CLINICALS TO REVIEW FOR INPATIENT ADMISSION: [id number]    PLEASE REPLY TO UR DEPT: [phone],  [phone]              Date of Birth   1935    Social Security Number       Address   [address] [place] [address]    Home Phone   [phone]    MRN   [id number]       Pentecostalism   Jainism    Marital Status                               Admission Date   4/5/22    Admission Type   Emergency    Admitting Provider   Bowen Coughlin MD    Attending Provider   Bowen Coughlin MD    Department, Room/Bed   [facility] Emergency Department, 48/48       Discharge Date       Discharge Disposition       Discharge Destination                               Attending Provider: Bowen Coughlin MD    Allergies: Other, Penicillins, Percocet [Oxycodone-acetaminophen]    Isolation: None   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)   Code Status: CPR   Advance Care Planning Activity    Ht: 182.9 cm (72\")   Wt: 70.8 kg (156 lb)    Admission Cmt: None   Principal Problem: Hematoma of iliopsoas muscle, left, initial encounter [S70.12XA]                 Active Insurance as of 4/5/2022     Primary Coverage     Payor Plan Insurance Group Employer/Plan Group    MEDICARE MEDICARE A & B      Payor Plan Address Payor Plan Phone Number Payor Plan Fax Number Effective Dates    [address] [phone]  11/1/2002 - None Entered    [facility] [id number]       Subscriber Name Subscriber Birth Date Member ID       LAURA XIE BRENNAN 1935 [id number]           Secondary Coverage     Payor Plan Insurance Group Employer/Plan Group    ANTHEM BLUE CROSS ANTHEM BLUE CROSS BLUE SHIELD PPO [id number]     Payor Plan Address Payor Plan Phone Number Payor Plan Fax Number Effective Dates    [address] [phone]  1/1/2022 - None Entered    [facility] [id number]       Subscriber Name Subscriber Birth Date Member ID       PATRICIA XIE 7/11/1955 " [id number]                 Emergency Contacts      (Rel.) Home Phone Work Phone Mobile Phone    Gladys Sequeira (Spouse) [phone] -- [phone]    Bruec Silva (Son) [phone] -- [phone]               History & Physical      Barbra Patino APRN at 04/06/22 1143     Attestation signed by Bowen Coughlin MD at 04/06/22 1436 (Updated)    I have reviewed this documentation and agree.  Addendum:   I, Bowen Coughlin MD, personally performed the services described in this documentation as scribed by the above named individual in my presence, and it is both accurate and complete.     I provided a substantive portion of the care of the patein, >50 %  I personally performed the physical exam in its entirety, and below are my findings    General: Alert and oriented x3, no acute distress, chronically ill-appearing.  HEENT: Normocephalic, atraumatic  Eyes: PERRL, EOMI, anicteric sclera  Lungs: Clear to auscultation bilaterally, no crackles or wheezes  CV: Irregular rate and rhythm, + systolic murmurs   Abdomen: Soft, nontender, nondistended.  Normoactive bowel sounds  Extremities: Left lower extremity pain limited range of motion secondary to pain.  Able to wiggle his extremity toes.  Intact sensation.  Normal strength right lower extremity.  1+ edema bilaterally.  Skin: Clean/dry/intact, no rashes  Neuro: Cranial nerves II through XII intact, no gross focal neurological deficits appreciated  Psych: Appropriate mood and affect      I agree with assessment and plan as per above with the addition of   Mr. Sequeira is a 86 y.o. male former smoker with a history of valvular heart disease, afib, chronic AC, CKD, DM, HTN, NICM, and multiple medical issues that presents to [facility] complaining of LLE weakness, swelling.  Was found to have left iliacus hematoma.  Patient was recently discharged from the hospital after cholecystectomy, aspiration pneumonia, and  pleural effusion                                                                                                        Left Iliopsoas hematoma  -CT showed Asymmetric enlargement of the patient's left iliacus and left  iliopsoas muscle, here are areas of more central low attenuation, could reflect hematoma liquefaction,  -Patient was recently discharged from the hospital after cholecystectomy, aspiration pneumonia.  Was discharged home on warfarin and Lovenox for bridge.  Hemoglobin was 8.6 on admission, repeat this morning 7.8  -hold anticoagulation , monitor H and H   -Discontinue aspirin as well as hemoglobin trending down  -Orthopedic surgery consulted.        History of mechanical aortic valve/atrial fibrillation: Continue metoprolol, digoxin.  Hold anticoagulation and aspirin secondary to hematoma as above and hemoglobin trended down.  Cardiology consulted.  Appreciate recommendations.      Recent C. difficile colitis: Continue vancomycin taper.      DM type II: Continue same scale insulin, hypoglycemic protocol.  May restart long-acting insulin if no interventions planned         Bowen Coughlin MD   4/6/2022  13:28 EDT                      Patient Name:  Francisco Sequeira  YOB: 1935  MRN:  [id number]  Admit Date:  4/5/2022  Patient Care Team:  Rubin Hinkle APRN as PCP - General (Family Medicine)  Audra Vazquez RPH as Pharmacist  Vasquez Niño, PharmD as Pharmacist (Pharmacy)  Tatiana Tinoco MD as Consulting Physician (Gastroenterology)      Subjective   History Present Illness     Chief Complaint   Patient presents with   • Leg Swelling       Mr. Sequeira is a 86 y.o. male former smoker with a history of valvular heart disease, afib, chronic AC, CKD, DM, HTN, NICM, and multiple medical issues that presents to [facility] complaining of LLE weakness, swelling. He was discharged from [facility] 4/4/22 following hospitalization for acute cholecystitis s/p lap cholecystectomy  3/29/22 and aspiration pneumonia. He was discharged on lovenox bridge and warfarin for subtherapeutic INR. He was doing well as far as walking around at discharge. Yesterday he began feeling weak in his LLE, having difficulty walking, and was unable to get up off of the toilet. Denies severe pain but has had lower leg edema that is uncomfortable. His wife reports his INR to be 1.3 yesterday. Pt denies fever, chest pain, shortness of breath, n/v/d, dysuria, back pain, numbness/tingling.    Afebrile. HR controlled. BP stable. On room air. WBC 7.51, Hgb 8.6. Trop 0.030. Gluc 184, CO2 29.6, Ca 8.5, Alb 2.70, Alk phos 131.Covid neg. CTH: no acute findings. CT L spine: no acute fx/subluxation; no acute Lspine findings; lt iliacus & lt iliopsoas muscle asymmetry/enlargement may represent hemorrhage/hematoma. CT LLE: no acute osseous findings; diffuse LLE edema. LLE doppler: normal      Review of Systems   Constitutional: Negative for fever.   HENT: Negative for congestion.    Respiratory: Negative for shortness of breath.    Cardiovascular: Positive for leg swelling. Negative for chest pain.   Gastrointestinal: Negative for nausea.   Genitourinary: Negative for difficulty urinating.   Musculoskeletal: Positive for gait problem and myalgias. Negative for arthralgias.   Skin: Negative for rash.   Neurological: Negative for headaches.   Psychiatric/Behavioral: Negative for sleep disturbance.        Personal History     Past Medical History:   Diagnosis Date   • Allergic rhinitis    • Aortic valve insufficiency    • Ascending aortic aneurysm (HCC)    • Atrial fibrillation (HCC)    • Bacteremia    • Calcific aortic stenosis of bicuspid valve    • Cardiac arrest (HCC)    • Cardiomyopathy (HCC)    • CKD (chronic kidney disease) stage 3, GFR 30-59 ml/min (HCC)    • Contact dermatitis due to poison ivy    • Elbow fracture    • Esophageal reflux    • GERD (gastroesophageal reflux disease)    • Head injury    • History of transfusion     • Hyperglycemia    • Hyperlipidemia    • Hypertension    • Kidney carcinoma (HCC)    • Nonischemic cardiomyopathy (HCC)    • Renal oncocytoma    • Seasonal allergic reaction    • Sinusitis    • Syncope    • Type 2 diabetes mellitus (HCC)     uncontrolled   • Visual field defect      Past Surgical History:   Procedure Laterality Date   • AORTIC VALVE REPAIR/REPLACEMENT     • ASCENDING AORTIC ANEURYSM REPAIR W/ MECHANICAL AORTIC VALVE REPLACEMENT     • CHOLECYSTECTOMY WITH INTRAOPERATIVE CHOLANGIOGRAM N/A 3/29/2022    Procedure: Laparoscopic cholecystectomy with cholangiogram, possible open;  Surgeon: Lisa Guidry MD;  Location: [facility] MAIN OR;  Service: General;  Laterality: N/A;   • COLONOSCOPY N/A 10/28/2021    Procedure: COLONOSCOPY to cecum:  cold snare polyps,;  Surgeon: Dinesh Meza MD;  Location: [facility] ENDOSCOPY;  Service: Gastroenterology;  Laterality: N/A;  pre:  Iron deficiency anemia  post:  polyps, diverticulosis,    • COLONOSCOPY N/A 11/9/2021    Procedure: COLONOSCOPY to cecum with APC cautery of AVM and clip placement x1;  Surgeon: Pavan Rodriguez MD;  Location: [facility] ENDOSCOPY;  Service: Gastroenterology;  Laterality: N/A;  PRE - gi bleed, anemia  POST - diverticulosis, poor prep, AVM right colon   • ENDOSCOPY N/A 10/28/2021    Procedure: ESOPHAGOGASTRODUODENOSCOPY with biopsies;  Surgeon: Dinesh Meza MD;  Location: [facility] ENDOSCOPY;  Service: Gastroenterology;  Laterality: N/A;  pre:  Iron deficiency anemia  post:  duodenitis and gastritis   • EYE SURGERY  12/09/2020    cataract surgery    • NEPHRECTOMY     • OTHER SURGICAL HISTORY      elbow surgery   • OTHER SURGICAL HISTORY      right arm surgery   • PROSTATE SURGERY     • THORACENTESIS Left     diagnostic     Family History   Problem Relation Age of Onset   • Cancer Mother         colon   • Cancer Brother         colon     Social History     Tobacco Use   • Smoking status: Former Smoker   • Smokeless tobacco:  Former User   • Tobacco comment: caffeine use   Vaping Use   • Vaping Use: Never used   Substance Use Topics   • Alcohol use: Yes     Comment: occasional   • Drug use: No     No current facility-administered medications on file prior to encounter.     Current Outpatient Medications on File Prior to Encounter   Medication Sig Dispense Refill   • aspirin 81 MG EC tablet Take 1 tablet by mouth Daily. 90 tablet 0   • atorvastatin (LIPITOR) 40 MG tablet Take 40 mg by mouth Daily.     • digoxin (LANOXIN) 125 MCG tablet TAKE ONE TABLET BY MOUTH DAILY (Patient taking differently: Take 62.5 mcg by mouth Daily.) 90 tablet 1   • diphenhydrAMINE (BENADRYL) 25 mg capsule Take 1 capsule by mouth 2 (Two) Times a Day As Needed for Itching, Allergies or Sleep.     • enoxaparin (LOVENOX) 80 MG/0.8ML solution syringe Discard 0.1 ml and Inject 0.7 mL under the skin into the appropriate area as directed Every 12 (Twelve) Hours. Continue until INR is therapeutic. 22.4 mL 6   • famotidine (PEPCID) 20 MG tablet Take 1 tablet by mouth 2 (Two) Times a Day Before Meals for 30 days. 60 tablet 0   • ferrous gluconate (FERGON) 324 MG tablet Take 1 tablet by mouth Every Other Day. 90 tablet 1   • furosemide (Lasix) 40 MG tablet Take 1 tablet by mouth Daily. 90 tablet 1   • Insulin Degludec (TRESIBA FLEXTOUCH SC) Inject 30-65 Units under the skin into the appropriate area as directed Daily As Needed. ---NOT USING THE TRESHIBA.------     • Insulin Disposable Pump (V-Go 40) kit USE AS DIRECTED THREE TIMES A DAY 30 each 11   • insulin lispro (humaLOG) 100 UNIT/ML injection USE AS DIRECTED WITH V-GO PUMP 30 mL 6   • lactobacillus acidophilus (RISAQUAD) capsule capsule Take 1 capsule by mouth Daily for 30 days. 30 capsule 0   • magnesium oxide (MAG-OX) 400 MG tablet Take 400 mg by mouth 2 (Two) Times a Day.     • metoprolol succinate XL (TOPROL-XL) 25 MG 24 hr tablet Take 0.5 tablets by mouth Daily for 30 days. 45 tablet 3   • Omega-3 Fatty Acids (FISH  OIL) 1000 MG capsule capsule Take 4,000 mg by mouth Daily With Breakfast.     • saccharomyces boulardii (FLORASTOR) 250 MG capsule Take 1 capsule by mouth 2 (Two) Times a Day for 30 days. 60 capsule 0   • vancomycin (VANCOCIN) 125 MG capsule Take 1 capsule by mouth Every Other Day for 7 doses. 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 115 tablet 1     Allergies   Allergen Reactions   • Other Other (See Comments)     Grass, ragweed   • Penicillins Swelling   • Percocet [Oxycodone-Acetaminophen] Nausea And Vomiting       Objective    Objective     Vital Signs  Temp:  [97.2 °F (36.2 °C)-98.7 °F (37.1 °C)] 97.2 °F (36.2 °C)  Heart Rate:  [68-88] 88  Resp:  [16-18] 16  BP: (103-150)/(57-79) 138/79  SpO2:  [98 %-100 %] 100 %  on   ;   Device (Oxygen Therapy): room air  Body mass index is 21.16 kg/m².    Physical Exam  Vitals and nursing note reviewed.   Constitutional:       General: He is not in acute distress.     Appearance: He is ill-appearing.   HENT:      Head: Normocephalic.      Mouth/Throat:      Mouth: Mucous membranes are moist.   Eyes:      Conjunctiva/sclera: Conjunctivae normal.   Cardiovascular:      Rate and Rhythm: Normal rate and regular rhythm.   Pulmonary:      Effort: Pulmonary effort is normal. No respiratory distress.      Breath sounds: No wheezing or rales.   Abdominal:      General: Bowel sounds are normal.      Palpations: Abdomen is soft.   Musculoskeletal:      Cervical back: Neck supple.      Right lower leg: No edema.      Left lower leg: Edema present.      Comments: 2+  Firmness lt hip; nontender   Skin:     General: Skin is warm and dry.   Neurological:      Mental Status: He is alert and oriented to person, place, and time.   Psychiatric:         Mood and Affect: Mood normal.         Behavior: Behavior normal.         Results Review:  I reviewed the patient's new clinical  results.  I reviewed the patient's new imaging results and agree with the interpretation.  I reviewed the patient's other test results and agree with the interpretation  I personally viewed and interpreted the patient's EKG/Telemetry data    Lab Results (last 24 hours)     Procedure Component Value Units Date/Time    CBC & Differential [[id number]]  (Abnormal) Collected: 04/05/22 2304    Specimen: Blood Updated: 04/05/22 2336    Narrative:      The following orders were created for panel order CBC & Differential.  Procedure                               Abnormality         Status                     ---------                               -----------         ------                     CBC Auto Differential[[id number]]        Abnormal            Final result                 Please view results for these tests on the individual orders.    CBC Auto Differential [[id number]]  (Abnormal) Collected: 04/05/22 2304    Specimen: Blood Updated: 04/05/22 2336     WBC 7.51 10*3/mm3      RBC 3.29 10*6/mm3      Hemoglobin 8.6 g/dL      Hematocrit 27.2 %      MCV 82.7 fL      MCH 26.1 pg      MCHC 31.6 g/dL      RDW 17.0 %      RDW-SD 51.1 fl      MPV 12.4 fL      Platelets 242 10*3/mm3      Neutrophil % 83.1 %      Lymphocyte % 9.5 %      Monocyte % 6.0 %      Eosinophil % 0.7 %      Basophil % 0.3 %      Immature Grans % 0.4 %      Neutrophils, Absolute 6.25 10*3/mm3      Lymphocytes, Absolute 0.71 10*3/mm3      Monocytes, Absolute 0.45 10*3/mm3      Eosinophils, Absolute 0.05 10*3/mm3      Basophils, Absolute 0.02 10*3/mm3      Immature Grans, Absolute 0.03 10*3/mm3      nRBC 0.0 /100 WBC     Protime-INR [[id number]]  (Abnormal) Collected: 04/05/22 2304    Specimen: Blood Updated: 04/05/22 2325     Protime 15.1 Seconds      INR 1.20    Comprehensive Metabolic Panel [[id number]]  (Abnormal) Collected: 04/06/22 0002    Specimen: Blood Updated: 04/06/22 0047     Glucose 184 mg/dL      BUN 11 mg/dL      Creatinine 1.09 mg/dL      Sodium  138 mmol/L      Potassium 4.1 mmol/L      Chloride 100 mmol/L      CO2 29.6 mmol/L      Calcium 8.5 mg/dL      Total Protein 6.3 g/dL      Albumin 2.70 g/dL      ALT (SGPT) 11 U/L      AST (SGOT) 14 U/L      Alkaline Phosphatase 131 U/L      Total Bilirubin 0.6 mg/dL      Globulin 3.6 gm/dL      A/G Ratio 0.8 g/dL      BUN/Creatinine Ratio 10.1     Anion Gap 8.4 mmol/L      eGFR 66.9 mL/min/1.73      Comment: National Kidney Foundation and American Society of Nephrology (ASN) Task Force recommended calculation based on the Chronic Kidney Disease Epidemiology Collaboration (CKD-EPI) equation refit without adjustment for race.       Narrative:      GFR Normal >60  Chronic Kidney Disease <60  Kidney Failure <15      Troponin [[id number]]  (Normal) Collected: 04/06/22 0002    Specimen: Blood Updated: 04/06/22 0035     Troponin T 0.030 ng/mL     Narrative:      Troponin T Reference Range:  <= 0.03 ng/mL-   Negative for AMI  >0.03 ng/mL-     Abnormal for myocardial necrosis.  Clinicians would have to utilize clinical acumen, EKG, Troponin and serial changes to determine if it is an Acute Myocardial Infarction or myocardial injury due to an underlying chronic condition.       Results may be falsely decreased if patient taking Biotin.      COVID PRE-OP / PRE-PROCEDURE SCREENING ORDER (NO ISOLATION) - Swab, Nasopharynx [[id number]]  (Normal) Collected: 04/06/22 0421    Specimen: Swab from Nasopharynx Updated: 04/06/22 0506    Narrative:      The following orders were created for panel order COVID PRE-OP / PRE-PROCEDURE SCREENING ORDER (NO ISOLATION) - Swab, Nasopharynx.  Procedure                               Abnormality         Status                     ---------                               -----------         ------                     COVID-19ARIANA IN-HOUSE...[[id number]]  Normal              Final result                 Please view results for these tests on the individual orders.    COVID-19[facility]IANA IN-HOUSE  CEPHEID/PORFIRIO NP SWAB IN TRANSPORT MEDIA 8-12 HR TAT - Swab, Nasopharynx [[id number]]  (Normal) Collected: 04/06/22 0421    Specimen: Swab from Nasopharynx Updated: 04/06/22 0506     COVID19 Not Detected    Narrative:      Fact sheet for providers: https://www.fda.gov/media/375326/download     Fact sheet for patients: https://www.fda.gov/media/373192/download    POC Glucose Once [[id number]]  (Abnormal) Collected: 04/06/22 0944    Specimen: Blood Updated: 04/06/22 0945     Glucose 162 mg/dL      Comment: Meter: [id number] : [id number] Jm Martinez MELO       Basic Metabolic Panel [[id number]]  (Abnormal) Collected: 04/06/22 0951    Specimen: Blood Updated: 04/06/22 1055     Glucose 161 mg/dL      BUN 11 mg/dL      Creatinine 1.14 mg/dL      Sodium 138 mmol/L      Potassium 4.2 mmol/L      Chloride 101 mmol/L      CO2 32.0 mmol/L      Calcium 8.1 mg/dL      BUN/Creatinine Ratio 9.6     Anion Gap 5.0 mmol/L      eGFR 63.4 mL/min/1.73      Comment: National Kidney Foundation and American Society of Nephrology (ASN) Task Force recommended calculation based on the Chronic Kidney Disease Epidemiology Collaboration (CKD-EPI) equation refit without adjustment for race.       Narrative:      GFR Normal >60  Chronic Kidney Disease <60  Kidney Failure <15      CBC (No Diff) [[id number]]  (Abnormal) Collected: 04/06/22 0951    Specimen: Blood Updated: 04/06/22 1025     WBC 7.40 10*3/mm3      RBC 3.04 10*6/mm3      Hemoglobin 7.8 g/dL      Hematocrit 25.4 %      MCV 83.6 fL      MCH 25.7 pg      MCHC 30.7 g/dL      RDW 17.4 %      RDW-SD 52.5 fl      MPV 12.0 fL      Platelets 213 10*3/mm3     Hemoglobin & Hematocrit, Blood [[id number]]  (Abnormal) Collected: 04/06/22 0951    Specimen: Blood Updated: 04/06/22 1025     Hemoglobin 7.8 g/dL      Hematocrit 25.4 %           Imaging Results (Last 24 Hours)     Procedure Component Value Units Date/Time    CT Lumbar Spine Without Contrast [[id number]] Collected: 04/06/22 0227     Updated:  04/06/22 0246    Narrative:      CT OF THE LUMBAR SPINE     HISTORY: Left lower extremity weakness     COMPARISON: 03/23/2022     TECHNIQUE: Axial CT imaging was obtained through the lumbar spine.  Coronal and sagittal reformatted images were obtained.     FINDINGS:  No acute fracture or subluxation of the lumbar spine is identified.  Intervertebral disc spaces appear relatively well-maintained. Lumbar  vertebral body alignment appears within normal limits.     T12-L1: There is no canal stenosis or neural foraminal narrowing.  L1-L2: There is broad-based disc bulge. This results in narrowing of the  canal, as well as bilateral neural foraminal narrowing, right greater  than left.  L2-L3: There is broad-based disc bulge. This results in canal narrowing,  as well as neural foraminal narrowing, right greater than left.  L3-L4: Disc bulge results in canal stenosis. There is bilateral neural  foraminal narrowing.  L4-L5: There is broad-based disc bulge. There is canal stenosis. There  is bilateral neural foraminal narrowing, left greater than right.  L5-S1: There is no canal stenosis or neural foraminal narrowing.        This patient has stranding which is seen posterior to the left psoas  muscle, which is new when compared to prior exam. There is also  asymmetric enlargement of the patient's left iliacus muscle, with  displacement of the left psoas muscle anteriorly. Appearance is  concerning for hematoma, given history of anticoagulation, although this  is incompletely assessed on this exam. Centrally, there is more focal  area of decreased attenuation. This could reflect some liquefying  hematoma, but correlation with any evidence of infection is recommended.  There is additional fluid and stranding which is seen within the left  perivesical space. There is some free fluid within the pelvis within the  right hemipelvis. The patient is noted to have a partially visualized  moderate right pleural effusion. There also is  some trace pleural fluid  on the left.       Impression:         1. No acute osseous findings.  2. Degenerative changes in the spine, as noted above.  3. Asymmetric enlargement of the patient's left iliacus and left  iliopsoas muscle, incompletely assessed on this exam. Given history of  anticoagulation, this may represent hemorrhage. There are areas of more  central low attenuation, which could reflect hematoma liquefaction, but  correlation with any evidence of infection is recommended. The  hemorrhage does abut the patient's left psoas muscle as well, and there  is also some stranding and fluid seen within the left perivesical space.     FINDINGS were discussed with Dr. Dubose at 2:40 AM.     Radiation dose reduction techniques were utilized, including automated  exposure control and exposure modulation based on body size.     This report was finalized on 4/6/2022 2:43 AM by Dr. Yue Frye M.D.       CT Lower Extremity Left Without Contrast [[id number]] Collected: 04/06/22 0219     Updated: 04/06/22 0230    Narrative:      CT OF THE LEFT LOWER EXTREMITY     HISTORY: Left leg weakness     COMPARISON: 04/05/2022     TECHNIQUE: Axial CT imaging was obtained through the left lower  extremity. Coronal and sagittal reformatted images were obtained.     FINDINGS:  No acute fracture or subluxation of the left knee is seen. There is  diffuse left lower extremity edema. There is a trace amount of patellar  fluid. There is really no significant stranding within the infrapatellar  bursa. Both the patellar and quadriceps tendon appear continuous. Dense  vascular calcifications are noted. The edema appears to be more  significant on the lateral aspect of the leg.       Impression:         1. No acute osseous findings.  2. Both the patellar tendon and quadriceps tendon appear to be intact.  If concern persists for soft tissue injury, consideration for MRI is  suggested.  3. Diffuse left lower extremity edema.      Radiation dose reduction techniques were utilized, including automated  exposure control and exposure modulation based on body size.     This report was finalized on 4/6/2022 2:27 AM by Dr. Yue Frye M.D.       CT Head Without Contrast [[id number]] Collected: 04/06/22 0214     Updated: 04/06/22 0221    Narrative:      CT HEAD WITHOUT CONTRAST     HISTORY: Left knee weakness     COMPARISON: 10/23/2021     TECHNIQUE: Axial CT imaging was obtained through the brain. No IV  contrast was administered.     FINDINGS:  Images through the posterior fossa are degraded by streak artifact from  dental amalgam. No acute intracranial hemorrhage is seen. There is  diffuse atrophy. There is periventricular and deep white matter  microangiopathic change. No calvarial fracture is seen. Paranasal  sinuses and mastoid air cells appear clear.       Impression:      No acute intracranial findings.     Radiation dose reduction techniques were utilized, including automated  exposure control and exposure modulation based on body size.     This report was finalized on 4/6/2022 2:18 AM by Dr. Yue Frye M.D.       XR Knee 1 or 2 View Left [[id number]] Collected: 04/05/22 2053     Updated: 04/05/22 2057    Narrative:      XR KNEE 1 OR 2 VW LEFT-     Clinical: Left knee pain     FINDINGS: Medial and lateral compartments preserved. There is  patellofemoral joint narrowing. Vascular arterial calcifications within  the soft tissues. No bone lesion, osteochondral defect, fracture or  dislocation seen.     CONCLUSION: Patellofemoral joint degeneration. No acute osseous or  articular abnormality.     This report was finalized on 4/5/2022 8:54 PM by Dr. Adrian Brand M.D.             Results for orders placed during the hospital encounter of 10/23/21    Adult transthoracic echo complete    Interpretation Summary  · Estimated left ventricular EF = 60% Left ventricular systolic function is normal.  · Left ventricular diastolic function  was indeterminate.  · The right ventricular cavity is mild to moderately dilated.  · Left atrial volume is moderately increased.  · The right atrial cavity is moderately dilated.  · There is a mechanical aortic valve present. The aortic valve peak and mean gradients are within defined limits.  · Moderate tricuspid valve regurgitation is present.  · Severe pulmonary hypertension is present. Calculated right ventricular systolic pressure from tricuspid regurgitation is 63.7 mmHg.      ECG 12 Lead   Preliminary Result   HEART RATE= 90  bpm   RR Interval= 666  ms   OR Interval=   ms   P Horizontal Axis=   deg   P Front Axis=   deg   QRSD Interval= 126  ms   QT Interval= 391  ms   QRS Axis= -43  deg   T Wave Axis= 90  deg   - ABNORMAL ECG -   Atrial fibrillation   Nonspecific IVCD with LAD   LVH with secondary repolarization abnormality   Anterior Q waves, possibly due to LVH   Electronically Signed By:    Date and Time of Study: 2022-04-05 23:15:46           Assessment/Plan     Active Hospital Problems    Diagnosis  POA   • **Hematoma of iliopsoas muscle, left, initial encounter [S70.12XA]  Yes   • History of CVA (cerebrovascular accident) [Z86.73]  Not Applicable   • S/P laparoscopic cholecystectomy [Z90.49]  Not Applicable   • Anemia [D64.9]  Yes   • Chronic anticoagulation [Z79.01]  Not Applicable   • Stage 3b chronic kidney disease (HCC) [N18.32]  Yes   • H/O heart valve replacement with mechanical valve [Z95.2]  Not Applicable   • Type 2 diabetes mellitus (HCC) [E11.9]  Yes   • Hypertension [I10]  Yes   • Atrial fibrillation (HCC) [I48.91]  Yes      Resolved Hospital Problems   No resolved problems to display.       Mr. Sequeira is a 86 y.o. male former smoker with a history of valvular heart disease, afib, chronic AC, CKD, DM, HTN, NICM, and multiple medical issues who is admitted for lt iliopsoas muscle hematoma on AC    -Ortho & Cardiology consult  -Hold AC for now. Serial H&H. Hgb has been trending around 9 on  recent labs. Transfuse as needed  -HR controlled. Monitor on telemetry  -S/P lap cholecystectomy. No apparent complications  -Monitor BG trends. Correctional scale insulin. A1C 6.7%. Resume home regimen when appropriate  -PT eval  -Further recommendations based on hospital course      · I discussed the patient's findings and my recommendations with patient, family and nursing staff.    VTE Prophylaxis - SCD RLE; anticoagulation on hold for now.  Code Status - Full code.       LIZA Saul  [facility]  04/06/22  12:00 EDT      Electronically signed by Bowen Coughlin MD at 04/06/22 1436          Emergency Department Notes      Estrella Solorzano RN at 04/05/22 1742        Pt to ED from home via EMS with c/o L leg edema starting today. Pt reports being dx yesterday after having pneumonia.  Pt L calf larger than R.  Pt reports being on warfarin and lovenox.  Pt also c/o difficulty ambulated r/t to edema.  Positive pedal pulses noted with doppler. Pt denies SOA or CP.    Pt wearing mask, staff wearing appropriate PPE.    Electronically signed by Estrella Solorzano RN at 04/05/22 4770     Julian Dubose MD at 04/05/22 2235           EMERGENCY DEPARTMENT ENCOUNTER    Room Number:  23/23  Date of encounter:  4/6/2022  PCP: Rubin Hinkle APRN  Historian: Patient      HPI:  Chief Complaint: Leg swelling and difficulty walking      Context: Francisco Sequeira is a 86 y.o. male who presents to the ED c/o acute onset of left leg swelling and trouble walking at this afternoon.  The patient was just discharged from here yesterday after nearly a 2-week stay for cholecystectomy, aspiration pneumonia and pleural effusion.  Of note is that the patient has history of A. fib and mechanical heart valve.  He was maintained on Lovenox and now is back on his Coumadin.  They state his INR was 1.3 this morning.  They state he took his morning Lovenox dose.  The patient and family states that he was  walking this morning and went to the bathroom and after sitting on the toilet for several minutes when he tried to stand up he felt weak in his left leg and noticed left leg swelling and states that he cannot walk on his left leg.  He states his left leg will do what he tells it to do but also complains of left knee pain.  He denies any falls or trauma.  He denies any headache.  He denies any focal neurologic complaints.  He denies chest pain or shortness of breath.      PAST MEDICAL HISTORY  Active Ambulatory Problems     Diagnosis Date Noted   • Atrial fibrillation ([facility])    • Hypertension    • Atopic rhinitis 03/21/2016   • Gastroesophageal reflux disease 03/21/2016   • Hyperlipidemia 03/21/2016   • Type 2 diabetes mellitus ([facility]) 03/21/2016   • Low testosterone 04/03/2017   • H/O heart valve replacement with mechanical valve 09/19/2018   • Kidney carcinoma ([facility]) 01/13/2020   • Stage 3b chronic kidney disease ([facility]) 01/13/2020   • BYRON (acute kidney injury) ([facility]) 01/14/2020   • Cerebrovascular accident (CVA) due to embolism of right middle cerebral artery ([facility]) 04/09/2020   • Iron deficiency anemia    • Chronic anticoagulation    • Hemiparesis of left nondominant side as late effect of cerebral infarction ([facility]) 11/02/2021   • Rectus sheath hematoma 11/05/2021   • Sepsis ([facility]) 03/23/2022   • Calculus of gallbladder with acute cholecystitis without obstruction 03/23/2022   • History of Clostridium difficile infection 03/23/2022   • Aspiration pneumonitis ([facility]) 03/23/2022     Resolved Ambulatory Problems     Diagnosis Date Noted   • Cardiomyopathy ([facility])    • Uncontrolled type 2 diabetes mellitus 03/21/2016   • Poison ivy 09/06/2016   • Low testosterone 04/07/2017   • Medicare annual wellness visit, subsequent 10/30/2017   • Sinusitis 05/01/2018   • Hx of mitral valve replacement with mechanical valve [Z95.2] 09/19/2018   • Screening for iron deficiency anemia 11/01/2018   • Stroke-like symptom 01/13/2020   • Acute  cerebral infarction (HCC) 01/14/2020   • Left-sided weakness 10/24/2021   • Pleural effusion on right 10/24/2021   • Lower GI bleed 11/02/2021   • History of nephrectomy 11/02/2021   • Abnormal urinalysis 11/02/2021   • Pleural effusion 11/02/2021   • Acute posthemorrhagic anemia 11/05/2021   • Angiodysplasia of colon without hemorrhage 11/10/2021   • Hospital discharge follow-up 11/19/2021   • C. difficile colitis 02/15/2022     Past Medical History:   Diagnosis Date   • Allergic rhinitis    • Aortic valve insufficiency    • Ascending aortic aneurysm (HCC)    • Bacteremia    • Calcific aortic stenosis of bicuspid valve    • Cardiac arrest (HCC)    • CKD (chronic kidney disease) stage 3, GFR 30-59 ml/min (HCC)    • Contact dermatitis due to poison ivy    • Elbow fracture    • Esophageal reflux    • GERD (gastroesophageal reflux disease)    • Head injury    • History of transfusion    • Hyperglycemia    • Nonischemic cardiomyopathy ([facility])    • Renal oncocytoma    • Seasonal allergic reaction    • Syncope    • Visual field defect          PAST SURGICAL HISTORY  Past Surgical History:   Procedure Laterality Date   • AORTIC VALVE REPAIR/REPLACEMENT     • ASCENDING AORTIC ANEURYSM REPAIR W/ MECHANICAL AORTIC VALVE REPLACEMENT     • CHOLECYSTECTOMY WITH INTRAOPERATIVE CHOLANGIOGRAM N/A 3/29/2022    Procedure: Laparoscopic cholecystectomy with cholangiogram, possible open;  Surgeon: Lisa Guidry MD;  Location: [facility] MAIN OR;  Service: General;  Laterality: N/A;   • COLONOSCOPY N/A 10/28/2021    Procedure: COLONOSCOPY to cecum:  cold snare polyps,;  Surgeon: Dinesh Meza MD;  Location: [facility] ENDOSCOPY;  Service: Gastroenterology;  Laterality: N/A;  pre:  Iron deficiency anemia  post:  polyps, diverticulosis,    • COLONOSCOPY N/A 11/9/2021    Procedure: COLONOSCOPY to cecum with APC cautery of AVM and clip placement x1;  Surgeon: Pavan Rodriguez MD;  Location: [facility] ENDOSCOPY;  Service: Gastroenterology;   Laterality: N/A;  PRE - gi bleed, anemia  POST - diverticulosis, poor prep, AVM right colon   • ENDOSCOPY N/A 10/28/2021    Procedure: ESOPHAGOGASTRODUODENOSCOPY with biopsies;  Surgeon: Dinesh Meza MD;  Location: [facility] ENDOSCOPY;  Service: Gastroenterology;  Laterality: N/A;  pre:  Iron deficiency anemia  post:  duodenitis and gastritis   • EYE SURGERY  12/09/2020    cataract surgery    • NEPHRECTOMY     • OTHER SURGICAL HISTORY      elbow surgery   • OTHER SURGICAL HISTORY      right arm surgery   • PROSTATE SURGERY     • THORACENTESIS Left     diagnostic         FAMILY HISTORY  Family History   Problem Relation Age of Onset   • Cancer Mother         colon   • Cancer Brother         colon         SOCIAL HISTORY  Social History     Socioeconomic History   • Marital status:    Tobacco Use   • Smoking status: Former Smoker   • Smokeless tobacco: Former User   • Tobacco comment: caffeine use   Vaping Use   • Vaping Use: Never used   Substance and Sexual Activity   • Alcohol use: Yes     Comment: occasional   • Drug use: No   • Sexual activity: Defer         ALLERGIES  Other, Penicillins, and Percocet [oxycodone-acetaminophen]        REVIEW OF SYSTEMS  Review of Systems       All systems reviewed and negative except for those discussed in HPI.     PHYSICAL EXAM    I have reviewed the triage vital signs and nursing notes.    ED Triage Vitals   Temp Heart Rate Resp BP SpO2   04/05/22 1733 04/05/22 1733 04/05/22 1745 04/05/22 1733 04/05/22 1733   97.2 °F (36.2 °C) 84 16 103/57 98 %      Temp src Heart Rate Source Patient Position BP Location FiO2 (%)   04/05/22 1733 -- -- -- --   Tympanic           GENERAL: 86-year-old well developed, well nourished in no acute distress  HENT: NCAT, neck supple, trachea midline  EYES: no scleral icterus, PERRL, normal conjunctivae  CV: regular rhythm, regular rate, 3/6 murmur  RESPIRATORY: unlabored effort, CTAB  ABDOMEN: soft, nontender, nondistended, bowel sounds  present  MUSCULOSKELETAL: no gross deformity, 1+ pedal edema on the right and 2+ pedal edema on the left, no calf tenderness: The patient is unable to lift his left leg off the bed he states he is tender to move but will not.  He is able to wiggle his leg side to side and plantarflex and dorsiflex his foot.  He does have some knee pain and possible deficit over his patellar tendon but is nontender.  The patient has cool extremities bilaterally with cap refill of 4 but bilateral dopplerable pulses.   NEURO: alert and oriented x3 with a normal neuro exam except that he is unable to lift his left lower leg off of the bed.  SKIN: warm, dry, no rash  PSYCH:  Appropriate mood and affect      PPE  Pt does not present with symptoms for COVID19; however, I was wearing a N95 mask and goggles throughout all patient interaction.    Vital signs and nursing notes reviewed.      LAB RESULTS  Recent Results (from the past 24 hour(s))   Duplex Venous Lower Extremity LEFT    Collection Time: 04/05/22  7:20 PM   Result Value Ref Range    Target HR (85%) 116 bpm    Max. Pred. HR (100%) 136 bpm    Right Common Femoral Spont Y     Right Common Femoral Phasic Y     Right Common Femoral Augment Y     Right Common Femoral Competent Y     Right Common Femoral Compress C     Left Common Femoral Spont Y     Left Common Femoral Phasic Y     Left Common Femoral Augment Y     Left Common Femoral Competent Y     Left Common Femoral Compress C     Left Saphenofemoral Junction Compress C     Left Profunda Femoral Compress C     Left Proximal Femoral Compress C     Left Mid Femoral Spont Y     Left Mid Femoral Phasic Y     Left Mid Femoral Augment Y     Left Mid Femoral Competent Y     Left Mid Femoral Compress C     Left Distal Femoral Compress C     Left Popliteal Spont Y     Left Popliteal Phasic Y     Left Popliteal Augment Y     Left Popliteal Competent Y     Left Popliteal Compress C     Left Posterior Tibial Compress C     Left Peroneal  Compress C     Left Gastronemius Compress C     Left Greater Saph AK Compress C     Left Greater Saph BK Compress C     Left Lesser Saph Compress C    Lavender Top    Collection Time: 04/05/22 11:04 PM   Result Value Ref Range    Extra Tube hold for add-on    Light Blue Top    Collection Time: 04/05/22 11:04 PM   Result Value Ref Range    Extra Tube hold for add-on    CBC Auto Differential    Collection Time: 04/05/22 11:04 PM    Specimen: Blood   Result Value Ref Range    WBC 7.51 3.40 - 10.80 10*3/mm3    RBC 3.29 (L) 4.14 - 5.80 10*6/mm3    Hemoglobin 8.6 (L) 13.0 - 17.7 g/dL    Hematocrit 27.2 (L) 37.5 - 51.0 %    MCV 82.7 79.0 - 97.0 fL    MCH 26.1 (L) 26.6 - 33.0 pg    MCHC 31.6 31.5 - 35.7 g/dL    RDW 17.0 (H) 12.3 - 15.4 %    RDW-SD 51.1 37.0 - 54.0 fl    MPV 12.4 (H) 6.0 - 12.0 fL    Platelets 242 140 - 450 10*3/mm3    Neutrophil % 83.1 (H) 42.7 - 76.0 %    Lymphocyte % 9.5 (L) 19.6 - 45.3 %    Monocyte % 6.0 5.0 - 12.0 %    Eosinophil % 0.7 0.3 - 6.2 %    Basophil % 0.3 0.0 - 1.5 %    Immature Grans % 0.4 0.0 - 0.5 %    Neutrophils, Absolute 6.25 1.70 - 7.00 10*3/mm3    Lymphocytes, Absolute 0.71 0.70 - 3.10 10*3/mm3    Monocytes, Absolute 0.45 0.10 - 0.90 10*3/mm3    Eosinophils, Absolute 0.05 0.00 - 0.40 10*3/mm3    Basophils, Absolute 0.02 0.00 - 0.20 10*3/mm3    Immature Grans, Absolute 0.03 0.00 - 0.05 10*3/mm3    nRBC 0.0 0.0 - 0.2 /100 WBC   Protime-INR    Collection Time: 04/05/22 11:04 PM    Specimen: Blood   Result Value Ref Range    Protime 15.1 (H) 11.7 - 14.2 Seconds    INR 1.20 (H) 0.90 - 1.10   ECG 12 Lead    Collection Time: 04/05/22 11:15 PM   Result Value Ref Range    QT Interval 391 ms   Comprehensive Metabolic Panel    Collection Time: 04/06/22 12:02 AM    Specimen: Blood   Result Value Ref Range    Glucose 184 (H) 65 - 99 mg/dL    BUN 11 8 - 23 mg/dL    Creatinine 1.09 0.76 - 1.27 mg/dL    Sodium 138 136 - 145 mmol/L    Potassium 4.1 3.5 - 5.2 mmol/L    Chloride 100 98 - 107 mmol/L     CO2 29.6 (H) 22.0 - 29.0 mmol/L    Calcium 8.5 (L) 8.6 - 10.5 mg/dL    Total Protein 6.3 6.0 - 8.5 g/dL    Albumin 2.70 (L) 3.50 - 5.20 g/dL    ALT (SGPT) 11 1 - 41 U/L    AST (SGOT) 14 1 - 40 U/L    Alkaline Phosphatase 131 (H) 39 - 117 U/L    Total Bilirubin 0.6 0.0 - 1.2 mg/dL    Globulin 3.6 gm/dL    A/G Ratio 0.8 g/dL    BUN/Creatinine Ratio 10.1 7.0 - 25.0    Anion Gap 8.4 5.0 - 15.0 mmol/L    eGFR 66.9 >60.0 mL/min/1.73   Green Top (Gel)    Collection Time: 04/06/22 12:02 AM   Result Value Ref Range    Extra Tube Hold for add-ons.    Troponin    Collection Time: 04/06/22 12:02 AM    Specimen: Blood   Result Value Ref Range    Troponin T 0.030 0.000 - 0.030 ng/mL       Ordered the above labs and independently reviewed the results.        RADIOLOGY  XR Knee 1 or 2 View Left    Result Date: 4/5/2022  XR KNEE 1 OR 2 VW LEFT-  Clinical: Left knee pain  FINDINGS: Medial and lateral compartments preserved. There is patellofemoral joint narrowing. Vascular arterial calcifications within the soft tissues. No bone lesion, osteochondral defect, fracture or dislocation seen.  CONCLUSION: Patellofemoral joint degeneration. No acute osseous or articular abnormality.  This report was finalized on 4/5/2022 8:54 PM by Dr. Adrian Brand M.D.      CT Head Without Contrast    Result Date: 4/6/2022  CT HEAD WITHOUT CONTRAST  HISTORY: Left knee weakness  COMPARISON: 10/23/2021  TECHNIQUE: Axial CT imaging was obtained through the brain. No IV contrast was administered.  FINDINGS: Images through the posterior fossa are degraded by streak artifact from dental amalgam. No acute intracranial hemorrhage is seen. There is diffuse atrophy. There is periventricular and deep white matter microangiopathic change. No calvarial fracture is seen. Paranasal sinuses and mastoid air cells appear clear.      No acute intracranial findings.  Radiation dose reduction techniques were utilized, including automated exposure control and exposure  modulation based on body size.  This report was finalized on 4/6/2022 2:18 AM by Dr. Yue Frye M.D.      CT Lumbar Spine Without Contrast    Result Date: 4/6/2022  CT OF THE LUMBAR SPINE  HISTORY: Left lower extremity weakness  COMPARISON: 03/23/2022  TECHNIQUE: Axial CT imaging was obtained through the lumbar spine. Coronal and sagittal reformatted images were obtained.  FINDINGS: No acute fracture or subluxation of the lumbar spine is identified. Intervertebral disc spaces appear relatively well-maintained. Lumbar vertebral body alignment appears within normal limits.  T12-L1: There is no canal stenosis or neural foraminal narrowing. L1-L2: There is broad-based disc bulge. This results in narrowing of the canal, as well as bilateral neural foraminal narrowing, right greater than left. L2-L3: There is broad-based disc bulge. This results in canal narrowing, as well as neural foraminal narrowing, right greater than left. L3-L4: Disc bulge results in canal stenosis. There is bilateral neural foraminal narrowing. L4-L5: There is broad-based disc bulge. There is canal stenosis. There is bilateral neural foraminal narrowing, left greater than right. L5-S1: There is no canal stenosis or neural foraminal narrowing.   This patient has stranding which is seen posterior to the left psoas muscle, which is new when compared to prior exam. There is also asymmetric enlargement of the patient's left iliacus muscle, with displacement of the left psoas muscle anteriorly. Appearance is concerning for hematoma, given history of anticoagulation, although this is incompletely assessed on this exam. Centrally, there is more focal area of decreased attenuation. This could reflect some liquefying hematoma, but correlation with any evidence of infection is recommended. There is additional fluid and stranding which is seen within the left perivesical space. There is some free fluid within the pelvis within the right hemipelvis. The  patient is noted to have a partially visualized moderate right pleural effusion. There also is some trace pleural fluid on the left.       1. No acute osseous findings. 2. Degenerative changes in the spine, as noted above. 3. Asymmetric enlargement of the patient's left iliacus and left iliopsoas muscle, incompletely assessed on this exam. Given history of anticoagulation, this may represent hemorrhage. There are areas of more central low attenuation, which could reflect hematoma liquefaction, but correlation with any evidence of infection is recommended. The hemorrhage does abut the patient's left psoas muscle as well, and there is also some stranding and fluid seen within the left perivesical space.  FINDINGS were discussed with Dr. Dubose at 2:40 AM.  Radiation dose reduction techniques were utilized, including automated exposure control and exposure modulation based on body size.  This report was finalized on 4/6/2022 2:43 AM by Dr. Yue Frye M.D.      CT Lower Extremity Left Without Contrast    Result Date: 4/6/2022  CT OF THE LEFT LOWER EXTREMITY  HISTORY: Left leg weakness  COMPARISON: 04/05/2022  TECHNIQUE: Axial CT imaging was obtained through the left lower extremity. Coronal and sagittal reformatted images were obtained.  FINDINGS: No acute fracture or subluxation of the left knee is seen. There is diffuse left lower extremity edema. There is a trace amount of patellar fluid. There is really no significant stranding within the infrapatellar bursa. Both the patellar and quadriceps tendon appear continuous. Dense vascular calcifications are noted. The edema appears to be more significant on the lateral aspect of the leg.       1. No acute osseous findings. 2. Both the patellar tendon and quadriceps tendon appear to be intact. If concern persists for soft tissue injury, consideration for MRI is suggested. 3. Diffuse left lower extremity edema.  Radiation dose reduction techniques were utilized,  including automated exposure control and exposure modulation based on body size.  This report was finalized on 4/6/2022 2:27 AM by Dr. Yue Frye M.D.        I ordered the above noted radiological studies. Independently reviewed by me and discussed with radiologist.  See dictation above for official radiology interpretation.      PROCEDURES    Procedures        MEDICATIONS GIVEN IN ER    Medications   sodium chloride 0.9 % flush 10 mL (has no administration in time range)   sodium chloride 0.9 % flush 10 mL (has no administration in time range)   ondansetron (ZOFRAN) injection 4 mg (4 mg Intravenous Given 4/6/22 0135)         PROGRESS, DATA ANALYSIS, CONSULTS, AND MEDICAL DECISION MAKING    All labs have been independently reviewed by me.  All radiology studies have been reviewed by me and discussed with radiologist dictating report.   EKG's independently reviewed by me.  Discussion below represents my analysis of pertinent findings related to patient's condition, differential diagnosis, treatment plan and final disposition.      ED Course as of 04/06/22 0318   Tue Apr 05, 2022 2238 Patient's left lower extremity venous Doppler is negative. [GP]   2239 The patient's knee x-ray shows arthritis but otherwise negative acute [GP]   2254 However the patient is still unable to lift his leg off the bed.  He does have dopplerable pulses so do not believe he had an acute embolism to his left leg.  I will obtain a CT of the head, lumbar spine and knee for further evaluation.  Of note is that the patient sat in triage for 5 hours because the hospital is full and we are holding people in the emergency room. [GP]   2329 EKG    EKG time: 2315  Rhythm/Rate: A. fib at 70  LVH  No Acute Ischemia  Non-Specific ST-T changes    Unchanged compared to prior on 3/30/2022    Interpreted Contemporaneously by me.  Independently viewed by me     [GP]   Wed Apr 06, 2022   0236 CT head and CT knee are unremarkable. [GP]   0239 I just  discussed the patient's CT lumbar spine with Dr. Frye from radiology.  The patient has a hematoma in his left iliac us and iliopsoas muscles likely due to him being on the Coumadin and Lovenox.  However the patient does have a history of a mechanical valve.  Thus he will need to be admitted to the hospital for further evaluation and care. [GP]   0250 I just advised the patient of his hematoma.  His hemoglobin is dropped from 9.1-8.6 over 2 days.  Advised him of this is a difficult situation since he has a mechanical aortic valve and needs to be anticoagulated.  I advised him we will admit him to the hospital for further evaluation care.  He understands and agrees with the plan. [GP]   0317 I discussed the case with Carmen Welch from [facility].  She is aware of the patient's hematoma, mechanical aortic valve, chronic anticoagulation and anemia.  She will admit the patient to telemetry bed under Dr. Heath's service for further evaluation and care. [GP]      ED Course User Index  [GP] Julian Dubose MD             AS OF 03:18 EDT VITALS:    BP - 150/77  HR - 84  TEMP - 97.2 °F (36.2 °C) (Tympanic)  02 SATS - 100%        DIAGNOSIS  Final diagnoses:   Hematoma of iliopsoas muscle, left, initial encounter   Chronic anticoagulation   H/O heart valve replacement with mechanical valve   Immobility syndrome   Anemia, unspecified type   History of atrial fibrillation         DISPOSITION  ADMISSION    Discussed treatment plan and reason for admission with pt/family and admitting physician.  Pt/family voiced understanding of the plan for admission for further testing/treatment as needed.        EMR Dragon/Transcription disclaimer:   Much of this encounter note is an electronic transcription/translation of spoken language to printed text.        Julian Dubose MD  04/06/22 0318      Electronically signed by Julian Dubose MD at 04/06/22 0318     Dilma Burrows, VITALIY at 04/05/22 2243        Shyla BURKS attempting to obtain  "blood work and start IV.     Electronically signed by Dilma Burrows, RN at 04/05/22 2254     Dilma Burrows RN at 04/05/22 2306        This RN in appropriate ppe while in pt room. Pt wearing mask.       Electronically signed by Dilma Burrows RN at 04/05/22 2306     Lennie Mclean, RN at 04/06/22 0411          Nursing report ED to floor  Francisco Sequeira  86 y.o.  male    HPI :   Chief Complaint   Patient presents with   • Leg Swelling       Admitting doctor:   Bruce Heath MD    Admitting diagnosis:   The primary encounter diagnosis was Hematoma of iliopsoas muscle, left, initial encounter. Diagnoses of Chronic anticoagulation, H/O heart valve replacement with mechanical valve, Immobility syndrome, Anemia, unspecified type, and History of atrial fibrillation were also pertinent to this visit.    Code status:   Current Code Status     Date Active Code Status Order ID Comments User Context       4/6/2022 0327 CPR (Attempt to Resuscitate) [id number]  Carmen Welch APRN ED     Advance Care Planning Activity      Questions for Current Code Status     Question Answer    Code Status (Patient has no pulse and is not breathing) CPR (Attempt to Resuscitate)    Medical Interventions (Patient has pulse or is breathing) Full Support          Allergies:   Other, Penicillins, and Percocet [oxycodone-acetaminophen]    Intake and Output  No intake or output data in the 24 hours ending 04/06/22 0411    Weight:       04/06/22 0047   Weight: 70.8 kg (156 lb)       Most recent vitals:   Vitals:    04/05/22 1733 04/05/22 1745 04/05/22 2213 04/06/22 0047   BP: 103/57  120/67 150/77   BP Location:    Right arm   Patient Position:    Sitting   Pulse: 84  86 84   Resp:  16 16 16   Temp: 97.2 °F (36.2 °C)      TempSrc: Tympanic      SpO2: 98%  98% 100%   Weight:    70.8 kg (156 lb)   Height:    182.9 cm (72\")       Active LDAs/IV Access:   Lines, Drains & Airways     Active LDAs     Name Placement date Placement time Site " Days    Peripheral IV 04/05/22 2305 Left Hand 04/05/22 2305  Hand  less than 1                Labs (abnormal labs have a star):   Labs Reviewed   COMPREHENSIVE METABOLIC PANEL - Abnormal; Notable for the following components:       Result Value    Glucose 184 (*)     CO2 29.6 (*)     Calcium 8.5 (*)     Albumin 2.70 (*)     Alkaline Phosphatase 131 (*)     All other components within normal limits    Narrative:     GFR Normal >60  Chronic Kidney Disease <60  Kidney Failure <15     CBC WITH AUTO DIFFERENTIAL - Abnormal; Notable for the following components:    RBC 3.29 (*)     Hemoglobin 8.6 (*)     Hematocrit 27.2 (*)     MCH 26.1 (*)     RDW 17.0 (*)     MPV 12.4 (*)     Neutrophil % 83.1 (*)     Lymphocyte % 9.5 (*)     All other components within normal limits   PROTIME-INR - Abnormal; Notable for the following components:    Protime 15.1 (*)     INR 1.20 (*)     All other components within normal limits   TROPONIN (IN-HOUSE) - Normal    Narrative:     Troponin T Reference Range:  <= 0.03 ng/mL-   Negative for AMI  >0.03 ng/mL-     Abnormal for myocardial necrosis.  Clinicians would have to utilize clinical acumen, EKG, Troponin and serial changes to determine if it is an Acute Myocardial Infarction or myocardial injury due to an underlying chronic condition.       Results may be falsely decreased if patient taking Biotin.     COVID PRE-OP / PRE-PROCEDURE SCREENING ORDER (NO ISOLATION)    Narrative:     The following orders were created for panel order COVID PRE-OP / PRE-PROCEDURE SCREENING ORDER (NO ISOLATION) - Swab, Nasopharynx.  Procedure                               Abnormality         Status                     ---------                               -----------         ------                     COVID-19, [facility]...[[id number]]                                                   Please view results for these tests on the individual orders.   COVID-19, [facility] CEPHEID/PORFIRIO, NP SWAB IN TRANSPORT  MEDIA 8-12 HR TAT   RAINBOW DRAW    Narrative:     The following orders were created for panel order [place] Draw.  Procedure                               Abnormality         Status                     ---------                               -----------         ------                     Green Top (Gel)[[id number]]                                  Final result               Lavender Top[[id number]]                                     Final result               Gold Top - SST[[id number]]                                                              Light Blue Top[[id number]]                                   Final result                 Please view results for these tests on the individual orders.   BASIC METABOLIC PANEL   CBC (NO DIFF)   HEMOGLOBIN AND HEMATOCRIT, BLOOD   CBC AND DIFFERENTIAL    Narrative:     The following orders were created for panel order CBC & Differential.  Procedure                               Abnormality         Status                     ---------                               -----------         ------                     CBC Auto Differential[[id number]]        Abnormal            Final result                 Please view results for these tests on the individual orders.   GREEN TOP   LAVENDER TOP   LIGHT BLUE TOP   GOLD TOP - SST       EKG:   ECG 12 Lead   Preliminary Result   HEART RATE= 90  bpm   RR Interval= 666  ms   IL Interval=   ms   P Horizontal Axis=   deg   P Front Axis=   deg   QRSD Interval= 126  ms   QT Interval= 391  ms   QRS Axis= -43  deg   T Wave Axis= 90  deg   - ABNORMAL ECG -   Atrial fibrillation   Nonspecific IVCD with LAD   LVH with secondary repolarization abnormality   Anterior Q waves, possibly due to LVH   Electronically Signed By:    Date and Time of Study: 2022-04-05 23:15:46          Meds given in ED:   Medications   sodium chloride 0.9 % flush 10 mL (has no administration in time range)   sodium chloride 0.9 % flush 10 mL (has no administration in time range)    sodium chloride 0.9 % flush 10 mL (has no administration in time range)   sodium chloride 0.9 % flush 10 mL (has no administration in time range)   acetaminophen (TYLENOL) tablet 650 mg (has no administration in time range)     Or   acetaminophen (TYLENOL) 160 MG/5ML solution 650 mg (has no administration in time range)     Or   acetaminophen (TYLENOL) suppository 650 mg (has no administration in time range)   ondansetron (ZOFRAN) tablet 4 mg (has no administration in time range)     Or   ondansetron (ZOFRAN) injection 4 mg (has no administration in time range)   calcium carbonate (TUMS) chewable tablet 500 mg (200 mg elemental) (has no administration in time range)   ondansetron (ZOFRAN) injection 4 mg (4 mg Intravenous Given 4/6/22 0135)       Imaging results:  CT Head Without Contrast    Result Date: 4/6/2022  No acute intracranial findings.  Radiation dose reduction techniques were utilized, including automated exposure control and exposure modulation based on body size.  This report was finalized on 4/6/2022 2:18 AM by Dr. Yue Frye M.D.      CT Lumbar Spine Without Contrast    Result Date: 4/6/2022   1. No acute osseous findings. 2. Degenerative changes in the spine, as noted above. 3. Asymmetric enlargement of the patient's left iliacus and left iliopsoas muscle, incompletely assessed on this exam. Given history of anticoagulation, this may represent hemorrhage. There are areas of more central low attenuation, which could reflect hematoma liquefaction, but correlation with any evidence of infection is recommended. The hemorrhage does abut the patient's left psoas muscle as well, and there is also some stranding and fluid seen within the left perivesical space.  FINDINGS were discussed with Dr. Dubose at 2:40 AM.  Radiation dose reduction techniques were utilized, including automated exposure control and exposure modulation based on body size.  This report was finalized on 4/6/2022 2:43 AM by   Yue Frye M.D.      CT Lower Extremity Left Without Contrast    Result Date: 4/6/2022   1. No acute osseous findings. 2. Both the patellar tendon and quadriceps tendon appear to be intact. If concern persists for soft tissue injury, consideration for MRI is suggested. 3. Diffuse left lower extremity edema.  Radiation dose reduction techniques were utilized, including automated exposure control and exposure modulation based on body size.  This report was finalized on 4/6/2022 2:27 AM by Dr. Yue Frye M.D.        Ambulatory status:   - with assist     Social issues:   Social History     Socioeconomic History   • Marital status:    Tobacco Use   • Smoking status: Former Smoker   • Smokeless tobacco: Former User   • Tobacco comment: caffeine use   Vaping Use   • Vaping Use: Never used   Substance and Sexual Activity   • Alcohol use: Yes     Comment: occasional   • Drug use: No   • Sexual activity: Defer       NIH Stroke Scale:        Nursing report ED to floor:    Electronically signed by Lennie Mclean RN at 04/06/22 0411       Vital Signs (last day)     Date/Time Temp Temp src Pulse Resp BP Patient Position SpO2    04/06/22 1215 97.5 (36.4) Oral 78 16 117/67 Lying 100    04/06/22 1210 -- -- -- -- 119/67 -- --    04/06/22 0730 -- -- 88 16 138/79 Lying --    04/06/22 04:13:38 -- -- 84 16 136/67 Sitting 100    04/06/22 00:47:43 -- -- 84 16 150/77 Sitting 100    04/05/22 2213 -- -- 86 16 120/67 -- 98    04/05/22 1745 -- -- -- 16 -- -- --    04/05/22 1733 97.2 (36.2) Tympanic 84 -- 103/57 -- 98          Oxygen Therapy (last day)     Date/Time SpO2 Device (Oxygen Therapy) Flow (L/min) Oxygen Concentration (%) ETCO2 (mmHg)    04/06/22 1215 100 room air -- -- --    04/06/22 0800 -- room air -- -- --    04/06/22 0730 -- room air -- -- --    04/06/22 0452 -- room air -- -- --    04/06/22 04:13:38 100 room air -- -- --    04/06/22 00:47:43 100 room air -- -- --    04/05/22 2213 98 -- -- -- --     04/05/22 1733 98 room air -- -- --          Intake & Output (last day)       04/05 0701 04/06 0700 04/06 0701 04/07 0700    Urine (mL/kg/hr)  300 (0.5)    Total Output  300    Net  -300          Urine Unmeasured Occurrence 1 x         Lines, Drains & Airways     Active LDAs     Name Placement date Placement time Site Days    Peripheral IV 04/05/22 2305 Left Hand 04/05/22 2305  Hand  less than 1                Current Facility-Administered Medications   Medication Dose Route Frequency Provider Last Rate Last Admin   • acetaminophen (TYLENOL) tablet 650 mg  650 mg Oral Q4H PRN Carmen Welch APRN   650 mg at 04/06/22 0730    Or   • acetaminophen (TYLENOL) 160 MG/5ML solution 650 mg  650 mg Oral Q4H PRN Carmen Welch APRN        Or   • acetaminophen (TYLENOL) suppository 650 mg  650 mg Rectal Q4H PRN Carmen Welch APRN       • atorvastatin (LIPITOR) tablet 40 mg  40 mg Oral Daily Barbra Patino APRN   40 mg at 04/06/22 1313   • calcium carbonate (TUMS) chewable tablet 500 mg (200 mg elemental)  2 tablet Oral BID PRN Carmen Welch APRN       • dextrose (D50W) (25 g/50 mL) IV injection 25 g  25 g Intravenous Q15 Min PRN Barbra Patino APRN       • dextrose (GLUTOSE) oral gel 15 g  15 g Oral Q15 Min PRN Barbra Patino APRN       • digoxin (LANOXIN) tablet 62.5 mcg  62.5 mcg Oral Daily Barbra Patino APRN   62.5 mcg at 04/06/22 1307   • diphenhydrAMINE (BENADRYL) capsule 25 mg  25 mg Oral BID PRN Barbra Patino APRN       • famotidine (PEPCID) tablet 20 mg  20 mg Oral BID AC Barbra Patino APRN       • ferrous sulfate tablet 325 mg  325 mg Oral Every Other Day Barbra Patino APRN   325 mg at 04/06/22 1312   • furosemide (LASIX) tablet 40 mg  40 mg Oral Daily Barbra Patino APRN   40 mg at 04/06/22 1306   • glucagon (human recombinant) (GLUCAGEN DIAGNOSTIC) injection 1 mg  1 mg Subcutaneous Q15 Min Barbra Walsh,  LIZA       • insulin lispro (ADMELOG) injection 0-9 Units  0-9 Units Subcutaneous TID AC Barbra Patino APRN       • lactobacillus acidophilus (RISAQUAD) capsule 1 capsule  1 capsule Oral Daily Barbra Patino APRN   1 capsule at 04/06/22 1312   • magnesium oxide (MAG-OX) tablet 400 mg  400 mg Oral BID Barbra Patino APRN   400 mg at 04/06/22 1312   • metoprolol succinate XL (TOPROL-XL) 24 hr tablet 12.5 mg  12.5 mg Oral Daily Barbra Patino APRN   12.5 mg at 04/06/22 1313   • ondansetron (ZOFRAN) tablet 4 mg  4 mg Oral Q6H PRN Carmen Welch APRN        Or   • ondansetron (ZOFRAN) injection 4 mg  4 mg Intravenous Q6H PRN Carmen Welch APRN       • sodium chloride 0.9 % flush 10 mL  10 mL Intravenous PRN Julian Dubose MD       • sodium chloride 0.9 % flush 10 mL  10 mL Intravenous PRN Julian Dubose MD       • sodium chloride 0.9 % flush 10 mL  10 mL Intravenous Q12H Carmen Welch APRN   10 mL at 04/06/22 0917   • sodium chloride 0.9 % flush 10 mL  10 mL Intravenous PRN Carmen Welch APRN       • vancomycin (VANCOCIN) capsule 125 mg  125 mg Oral Every Other Day Barbra Patino APRN   125 mg at 04/06/22 1307     Current Outpatient Medications   Medication Sig Dispense Refill   • aspirin 81 MG EC tablet Take 1 tablet by mouth Daily. 90 tablet 0   • atorvastatin (LIPITOR) 40 MG tablet Take 40 mg by mouth Daily.     • digoxin (LANOXIN) 125 MCG tablet TAKE ONE TABLET BY MOUTH DAILY (Patient taking differently: Take 62.5 mcg by mouth Daily.) 90 tablet 1   • diphenhydrAMINE (BENADRYL) 25 mg capsule Take 1 capsule by mouth 2 (Two) Times a Day As Needed for Itching, Allergies or Sleep.     • enoxaparin (LOVENOX) 80 MG/0.8ML solution syringe Discard 0.1 ml and Inject 0.7 mL under the skin into the appropriate area as directed Every 12 (Twelve) Hours. Continue until INR is therapeutic. 22.4 mL 6   • famotidine (PEPCID) 20 MG tablet Take 1 tablet by mouth  2 (Two) Times a Day Before Meals for 30 days. 60 tablet 0   • ferrous gluconate (FERGON) 324 MG tablet Take 1 tablet by mouth Every Other Day. 90 tablet 1   • furosemide (Lasix) 40 MG tablet Take 1 tablet by mouth Daily. 90 tablet 1   • Insulin Degludec (TRESIBA FLEXTOUCH SC) Inject 30-65 Units under the skin into the appropriate area as directed Daily As Needed. ---NOT USING THE TRESHIBA.------     • Insulin Disposable Pump (V-Go 40) kit USE AS DIRECTED THREE TIMES A DAY 30 each 11   • insulin lispro (humaLOG) 100 UNIT/ML injection USE AS DIRECTED WITH V-GO PUMP 30 mL 6   • lactobacillus acidophilus (RISAQUAD) capsule capsule Take 1 capsule by mouth Daily for 30 days. 30 capsule 0   • magnesium oxide (MAG-OX) 400 MG tablet Take 400 mg by mouth 2 (Two) Times a Day.     • metoprolol succinate XL (TOPROL-XL) 25 MG 24 hr tablet Take 0.5 tablets by mouth Daily for 30 days. 45 tablet 3   • Omega-3 Fatty Acids (FISH OIL) 1000 MG capsule capsule Take 4,000 mg by mouth Daily With Breakfast.     • saccharomyces boulardii (FLORASTOR) 250 MG capsule Take 1 capsule by mouth 2 (Two) Times a Day for 30 days. 60 capsule 0   • vancomycin (VANCOCIN) 125 MG capsule Take 1 capsule by mouth Every Other Day for 7 doses. 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 115 tablet 1         Lab Results (last 24 hours)     Procedure Component Value Units Date/Time    POC Glucose Once [[id number]]  (Abnormal) Collected: 04/06/22 1212    Specimen: Blood Updated: 04/06/22 1213     Glucose 148 mg/dL      Comment: Meter: [id number] : [id number] Jm ALEJO       Basic Metabolic Panel [[id number]]  (Abnormal) Collected: 04/06/22 0951    Specimen: Blood Updated: 04/06/22 1055     Glucose 161 mg/dL      BUN 11 mg/dL      Creatinine 1.14 mg/dL      Sodium 138 mmol/L      Potassium 4.2 mmol/L      Chloride 101 mmol/L      CO2 32.0  mmol/L      Calcium 8.1 mg/dL      BUN/Creatinine Ratio 9.6     Anion Gap 5.0 mmol/L      eGFR 63.4 mL/min/1.73      Comment: National Kidney Foundation and American Society of Nephrology (ASN) Task Force recommended calculation based on the Chronic Kidney Disease Epidemiology Collaboration (CKD-EPI) equation refit without adjustment for race.       Narrative:      GFR Normal >60  Chronic Kidney Disease <60  Kidney Failure <15      Hemoglobin & Hematocrit, Blood [[id number]]  (Abnormal) Collected: 04/06/22 0951    Specimen: Blood Updated: 04/06/22 1025     Hemoglobin 7.8 g/dL      Hematocrit 25.4 %     CBC (No Diff) [[id number]]  (Abnormal) Collected: 04/06/22 0951    Specimen: Blood Updated: 04/06/22 1025     WBC 7.40 10*3/mm3      RBC 3.04 10*6/mm3      Hemoglobin 7.8 g/dL      Hematocrit 25.4 %      MCV 83.6 fL      MCH 25.7 pg      MCHC 30.7 g/dL      RDW 17.4 %      RDW-SD 52.5 fl      MPV 12.0 fL      Platelets 213 10*3/mm3     POC Glucose Once [[id number]]  (Abnormal) Collected: 04/06/22 0944    Specimen: Blood Updated: 04/06/22 0945     Glucose 162 mg/dL      Comment: Meter: [id number] : [id number] Jm ALEJO       COVID PRE-OP / PRE-PROCEDURE SCREENING ORDER (NO ISOLATION) - Swab, Nasopharynx [[id number]]  (Normal) Collected: 04/06/22 0421    Specimen: Swab from Nasopharynx Updated: 04/06/22 0506    Narrative:      The following orders were created for panel order COVID PRE-OP / PRE-PROCEDURE SCREENING ORDER (NO ISOLATION) - Swab, Nasopharynx.  Procedure                               Abnormality         Status                     ---------                               -----------         ------                     COVID-19,[facility]...[[id number]]  Normal              Final result                 Please view results for these tests on the individual orders.    COVID-19,[facility] CEPHEID/PORFIRIO NP SWAB IN TRANSPORT MEDIA 8-12 HR TAT - Swab, Nasopharynx [[id number]]  (Normal) Collected:  04/06/22 0421    Specimen: Swab from Nasopharynx Updated: 04/06/22 0506     COVID19 Not Detected    Narrative:      Fact sheet for providers: https://www.fda.gov/media/903701/download     Fact sheet for patients: https://www.fda.gov/media/793378/download    [place] Draw [[id number]] Collected: 04/05/22 2304    Specimen: Blood Updated: 04/06/22 0117    Narrative:      The following orders were created for panel order [place] Draw.  Procedure                               Abnormality         Status                     ---------                               -----------         ------                     Green Top (Gel)[[id number]]                                  Final result               Lavender Top[[id number]]                                     Final result               Gold Top - SST[[id number]]                                                              Light Blue Top[[id number]]                                   Final result                 Please view results for these tests on the individual orders.    Green Top (Gel) [[id number]] Collected: 04/06/22 0002    Specimen: Blood Updated: 04/06/22 0117     Extra Tube Hold for add-ons.     Comment: Auto resulted.       Comprehensive Metabolic Panel [[id number]]  (Abnormal) Collected: 04/06/22 0002    Specimen: Blood Updated: 04/06/22 0047     Glucose 184 mg/dL      BUN 11 mg/dL      Creatinine 1.09 mg/dL      Sodium 138 mmol/L      Potassium 4.1 mmol/L      Chloride 100 mmol/L      CO2 29.6 mmol/L      Calcium 8.5 mg/dL      Total Protein 6.3 g/dL      Albumin 2.70 g/dL      ALT (SGPT) 11 U/L      AST (SGOT) 14 U/L      Alkaline Phosphatase 131 U/L      Total Bilirubin 0.6 mg/dL      Globulin 3.6 gm/dL      A/G Ratio 0.8 g/dL      BUN/Creatinine Ratio 10.1     Anion Gap 8.4 mmol/L      eGFR 66.9 mL/min/1.73      Comment: National Kidney Foundation and American Society of Nephrology (ASN) Task Force recommended calculation based on the Chronic Kidney Disease Epidemiology  Collaboration (CKD-EPI) equation refit without adjustment for race.       Narrative:      GFR Normal >60  Chronic Kidney Disease <60  Kidney Failure <15      Troponin [[id number]]  (Normal) Collected: 04/06/22 0002    Specimen: Blood Updated: 04/06/22 0035     Troponin T 0.030 ng/mL     Narrative:      Troponin T Reference Range:  <= 0.03 ng/mL-   Negative for AMI  >0.03 ng/mL-     Abnormal for myocardial necrosis.  Clinicians would have to utilize clinical acumen, EKG, Troponin and serial changes to determine if it is an Acute Myocardial Infarction or myocardial injury due to an underlying chronic condition.       Results may be falsely decreased if patient taking Biotin.      Light Blue Top [[id number]] Collected: 04/05/22 2304    Specimen: Blood Updated: 04/06/22 0017     Extra Tube hold for add-on     Comment: Auto resulted       Lavender Top [[id number]] Collected: 04/05/22 2304    Specimen: Blood Updated: 04/06/22 0017     Extra Tube hold for add-on     Comment: Auto resulted       CBC & Differential [[id number]]  (Abnormal) Collected: 04/05/22 2304    Specimen: Blood Updated: 04/05/22 2336    Narrative:      The following orders were created for panel order CBC & Differential.  Procedure                               Abnormality         Status                     ---------                               -----------         ------                     CBC Auto Differential[[id number]]        Abnormal            Final result                 Please view results for these tests on the individual orders.    CBC Auto Differential [[id number]]  (Abnormal) Collected: 04/05/22 2304    Specimen: Blood Updated: 04/05/22 2336     WBC 7.51 10*3/mm3      RBC 3.29 10*6/mm3      Hemoglobin 8.6 g/dL      Hematocrit 27.2 %      MCV 82.7 fL      MCH 26.1 pg      MCHC 31.6 g/dL      RDW 17.0 %      RDW-SD 51.1 fl      MPV 12.4 fL      Platelets 242 10*3/mm3      Neutrophil % 83.1 %      Lymphocyte % 9.5 %      Monocyte % 6.0 %       Eosinophil % 0.7 %      Basophil % 0.3 %      Immature Grans % 0.4 %      Neutrophils, Absolute 6.25 10*3/mm3      Lymphocytes, Absolute 0.71 10*3/mm3      Monocytes, Absolute 0.45 10*3/mm3      Eosinophils, Absolute 0.05 10*3/mm3      Basophils, Absolute 0.02 10*3/mm3      Immature Grans, Absolute 0.03 10*3/mm3      nRBC 0.0 /100 WBC     Protime-INR [[id number]]  (Abnormal) Collected: 04/05/22 2304    Specimen: Blood Updated: 04/05/22 2325     Protime 15.1 Seconds      INR 1.20        Imaging Results (Last 24 Hours)     Procedure Component Value Units Date/Time    CT Lumbar Spine Without Contrast [[id number]] Collected: 04/06/22 0227     Updated: 04/06/22 0246    Narrative:      CT OF THE LUMBAR SPINE     HISTORY: Left lower extremity weakness     COMPARISON: 03/23/2022     TECHNIQUE: Axial CT imaging was obtained through the lumbar spine.  Coronal and sagittal reformatted images were obtained.     FINDINGS:  No acute fracture or subluxation of the lumbar spine is identified.  Intervertebral disc spaces appear relatively well-maintained. Lumbar  vertebral body alignment appears within normal limits.     T12-L1: There is no canal stenosis or neural foraminal narrowing.  L1-L2: There is broad-based disc bulge. This results in narrowing of the  canal, as well as bilateral neural foraminal narrowing, right greater  than left.  L2-L3: There is broad-based disc bulge. This results in canal narrowing,  as well as neural foraminal narrowing, right greater than left.  L3-L4: Disc bulge results in canal stenosis. There is bilateral neural  foraminal narrowing.  L4-L5: There is broad-based disc bulge. There is canal stenosis. There  is bilateral neural foraminal narrowing, left greater than right.  L5-S1: There is no canal stenosis or neural foraminal narrowing.        This patient has stranding which is seen posterior to the left psoas  muscle, which is new when compared to prior exam. There is also  asymmetric enlargement of  the patient's left iliacus muscle, with  displacement of the left psoas muscle anteriorly. Appearance is  concerning for hematoma, given history of anticoagulation, although this  is incompletely assessed on this exam. Centrally, there is more focal  area of decreased attenuation. This could reflect some liquefying  hematoma, but correlation with any evidence of infection is recommended.  There is additional fluid and stranding which is seen within the left  perivesical space. There is some free fluid within the pelvis within the  right hemipelvis. The patient is noted to have a partially visualized  moderate right pleural effusion. There also is some trace pleural fluid  on the left.       Impression:         1. No acute osseous findings.  2. Degenerative changes in the spine, as noted above.  3. Asymmetric enlargement of the patient's left iliacus and left  iliopsoas muscle, incompletely assessed on this exam. Given history of  anticoagulation, this may represent hemorrhage. There are areas of more  central low attenuation, which could reflect hematoma liquefaction, but  correlation with any evidence of infection is recommended. The  hemorrhage does abut the patient's left psoas muscle as well, and there  is also some stranding and fluid seen within the left perivesical space.     FINDINGS were discussed with Dr. Dubose at 2:40 AM.     Radiation dose reduction techniques were utilized, including automated  exposure control and exposure modulation based on body size.     This report was finalized on 4/6/2022 2:43 AM by Dr. Yue Frye M.D.       CT Lower Extremity Left Without Contrast [[id number]] Collected: 04/06/22 0219     Updated: 04/06/22 0230    Narrative:      CT OF THE LEFT LOWER EXTREMITY     HISTORY: Left leg weakness     COMPARISON: 04/05/2022     TECHNIQUE: Axial CT imaging was obtained through the left lower  extremity. Coronal and sagittal reformatted images were obtained.     FINDINGS:  No  acute fracture or subluxation of the left knee is seen. There is  diffuse left lower extremity edema. There is a trace amount of patellar  fluid. There is really no significant stranding within the infrapatellar  bursa. Both the patellar and quadriceps tendon appear continuous. Dense  vascular calcifications are noted. The edema appears to be more  significant on the lateral aspect of the leg.       Impression:         1. No acute osseous findings.  2. Both the patellar tendon and quadriceps tendon appear to be intact.  If concern persists for soft tissue injury, consideration for MRI is  suggested.  3. Diffuse left lower extremity edema.     Radiation dose reduction techniques were utilized, including automated  exposure control and exposure modulation based on body size.     This report was finalized on 4/6/2022 2:27 AM by Dr. Yue Frye M.D.       CT Head Without Contrast [[id number]] Collected: 04/06/22 0214     Updated: 04/06/22 0221    Narrative:      CT HEAD WITHOUT CONTRAST     HISTORY: Left knee weakness     COMPARISON: 10/23/2021     TECHNIQUE: Axial CT imaging was obtained through the brain. No IV  contrast was administered.     FINDINGS:  Images through the posterior fossa are degraded by streak artifact from  dental amalgam. No acute intracranial hemorrhage is seen. There is  diffuse atrophy. There is periventricular and deep white matter  microangiopathic change. No calvarial fracture is seen. Paranasal  sinuses and mastoid air cells appear clear.       Impression:      No acute intracranial findings.     Radiation dose reduction techniques were utilized, including automated  exposure control and exposure modulation based on body size.     This report was finalized on 4/6/2022 2:18 AM by Dr. Yue Frye M.D.       XR Knee 1 or 2 View Left [[id number]] Collected: 04/05/22 2053     Updated: 04/05/22 2057    Narrative:      XR KNEE 1 OR 2 VW LEFT-     Clinical: Left knee pain     FINDINGS:  Medial and lateral compartments preserved. There is  patellofemoral joint narrowing. Vascular arterial calcifications within  the soft tissues. No bone lesion, osteochondral defect, fracture or  dislocation seen.     CONCLUSION: Patellofemoral joint degeneration. No acute osseous or  articular abnormality.     This report was finalized on 4/5/2022 8:54 PM by Dr. Adrian Brand M.D.           ECG/EMG Results (last 24 hours)     Procedure Component Value Units Date/Time    ECG 12 Lead [[id number]] Collected: 04/05/22 2315     Updated: 04/06/22 1420     QT Interval 391 ms     Narrative:      HEART RATE= 90  bpm  RR Interval= 666  ms  SD Interval=   ms  P Horizontal Axis=   deg  P Front Axis=   deg  QRSD Interval= 126  ms  QT Interval= 391  ms  QRS Axis= -43  deg  T Wave Axis= 90  deg  - ABNORMAL ECG -  Atrial fibrillation  Nonspecific IVCD with LAD  LVH with secondary repolarization abnormality  Anterior Q waves, possibly due to LVH  NO SIGNIFICANT CHANGE FROM PREVIOUS ECG  Electronically Signed By: Ford Breger ([facility]) 06-Apr-2022 14:20:37  Date and Time of Study: 2022-04-05 23:15:46          Orders (last 24 hrs)      Start     Ordered    04/07/22 0600  Basic Metabolic Panel  Morning Draw,   Status:  Canceled         04/06/22 1205    04/07/22 0600  Basic Metabolic Panel  Daily       04/06/22 1354    04/07/22 0600  Magnesium  Daily       04/06/22 1354    04/07/22 0600  Phosphorus  Daily       04/06/22 1354    04/07/22 0600  CBC (No Diff)  Daily       04/06/22 1354    04/06/22 1730  famotidine (PEPCID) tablet 20 mg  2 Times Daily Before Meals         04/06/22 1053    04/06/22 1417  Diet Regular; Cardiac, Consistent Carbohydrate  Diet Effective Now         04/06/22 1416    04/06/22 1300  aspirin EC tablet 81 mg  Daily,   Status:  Discontinued         04/06/22 1053    04/06/22 1300  atorvastatin (LIPITOR) tablet 40 mg  Daily         04/06/22 1053    04/06/22 1300  ferrous sulfate tablet 325 mg  Every Other Day          04/06/22 1053    04/06/22 1300  lactobacillus acidophilus (RISAQUAD) capsule 1 capsule  Daily         04/06/22 1053    04/06/22 1300  magnesium oxide (MAG-OX) tablet 400 mg  2 Times Daily         04/06/22 1053    04/06/22 1300  metoprolol succinate XL (TOPROL-XL) 24 hr tablet 12.5 mg  Daily         04/06/22 1053    04/06/22 1214  POC Glucose Once  PROCEDURE ONCE         04/06/22 1212    04/06/22 1200  digoxin (LANOXIN) tablet 62.5 mcg  Daily Digoxin         04/06/22 1053    04/06/22 1100  POC Glucose TID AC  3 Times Daily Before Meals       04/06/22 0924    04/06/22 1055  furosemide (LASIX) tablet 40 mg  Daily         04/06/22 1053    04/06/22 1055  vancomycin (VANCOCIN) capsule 125 mg  Every Other Day         04/06/22 1053    04/06/22 1053  diphenhydrAMINE (BENADRYL) capsule 25 mg  2 Times Daily PRN         04/06/22 1053    04/06/22 0946  POC Glucose Once  PROCEDURE ONCE         04/06/22 0944    04/06/22 0926  insulin lispro (ADMELOG) injection 0-9 Units  3 Times Daily Before Meals         04/06/22 0924    04/06/22 0925  Do NOT Hold Basal or Correction Scale Insulin When Patient is NPO, Hold Scheduled Mealtime (Bolus) Insulin if NPO  Continuous         04/06/22 0924    04/06/22 0925  Follow [facility] Hypoglycemia Standing Orders For Blood Glucose Less Than 70 mg/dL  Until Discontinued        Comments: ALERT PATIENT - NOT NPO & CAN SAFELY SWALLOW  Administer 4 oz Fruit Juice OR 4 oz Regular Soda OR 8 oz Milk OR 15-30 grams (1 tube) of Glucose Gel.  Recheck Blood Glucose Approximately 15 Minutes After Ingestion, Repeat Treatment & Continue to Recheck Blood Sugar Approximately Every 15 Minutes Until Blood Glucose is 70 or Higher.  Once Blood Glucose is 70 or Higher & if It Will Be More Than 60 Minutes Until Next Meal, Provide Appropriate Snack (Including Carbohydrate Food) Based on Meal Plan Orde altagracia. Give Meal Tray As Soon As Possible.    PATIENT HAS IV ACCESS - UNRESPONSIVE, NPO OR UNABLE TO SAFELY SWALLOW  Administer 25g  (50ml) D50W IV Push.  Recheck Blood Glucose Approximately 15 Minutes After Administration, if Blood Glucose Remains Less Than 70, Repeat Treatment   Recheck Blood Glucose Approximately 15 Minutes After 2nd Administration, if Blood Glucose Remains Less Than 70 After 2nd Dose of D50W, Contact Provider for Further Treatment Orders & Consider Adding IVF With D5W for [facility]    PATIENT WITHOUT IV ACCESS - UNRESPONSIVE, NPO OR UNABLE TO SAFELY SWALLOW  Administer 1mg Glucagon SQ & Establish IV Access.  Turn Patient on Side - Nausea / Vomiting May Occur.  Recheck Blood Glucose Approximately 15 Minutes After Administration.  If Blood Glucose Remains Less Than 70, Administer 25g D50W IV Push (50ml).  Recheck Blood Glucose Approximately 15 Minutes After Administration of D50W, if Blood Glucose Remains Less Than 70, Contact Provider for Further Treatment Orders & C  Consider Adding IVF With D5 for Maintenance    Document Event & Patient Response to Interventions in EMR, Document Medications on MAR  Notify Provider if Hypoglycemia Treatment Needed    04/06/22 0924    04/06/22 0925  PT Consult: Eval & Treat Functional Mobility Below Baseline  Once        Comments: Reason Why PT Needed: increased weakness    04/06/22 0924    04/06/22 0924  dextrose (GLUTOSE) oral gel 15 g  Every 15 Minutes PRN         04/06/22 0924    04/06/22 0924  dextrose (D50W) (25 g/50 mL) IV injection 25 g  Every 15 Minutes PRN         04/06/22 0924    04/06/22 0924  glucagon (human recombinant) (GLUCAGEN DIAGNOSTIC) injection 1 mg  Every 15 Minutes PRN         04/06/22 0924    04/06/22 0900  sodium chloride 0.9 % flush 10 mL  Every 12 Hours Scheduled         04/06/22 0326    04/06/22 0800  Hemoglobin & Hematocrit, Blood  Every 8 Hours       04/06/22 0326    04/06/22 0600  Basic Metabolic Panel  Morning Draw         04/06/22 0326    04/06/22 0600  CBC (No Diff)  Morning Draw         04/06/22 0326    04/06/22 0400  Vital Signs  Every 4 Hours        "04/06/22 0326    04/06/22 0327  Inpatient Orthopedic Surgery Consult  Once        Specialty:  Orthopedic Surgery  Provider:  Ellis Meredith II, MD    04/06/22 0326    04/06/22 0323  calcium carbonate (TUMS) chewable tablet 500 mg (200 mg elemental)  2 Times Daily PRN         04/06/22 0326    04/06/22 0323  ondansetron (ZOFRAN) tablet 4 mg  Every 6 Hours PRN        \"Or\" Linked Group Details    04/06/22 0326    04/06/22 0323  ondansetron (ZOFRAN) injection 4 mg  Every 6 Hours PRN        \"Or\" Linked Group Details    04/06/22 0326    04/06/22 0323  acetaminophen (TYLENOL) tablet 650 mg  Every 4 Hours PRN        \"Or\" Linked Group Details    04/06/22 0326    04/06/22 0323  acetaminophen (TYLENOL) 160 MG/5ML solution 650 mg  Every 4 Hours PRN        \"Or\" Linked Group Details    04/06/22 0326    04/06/22 0323  acetaminophen (TYLENOL) suppository 650 mg  Every 4 Hours PRN        \"Or\" Linked Group Details    04/06/22 0326    04/06/22 0322  NPO Diet  Diet Effective Now,   Status:  Canceled         04/06/22 0326    04/06/22 0322  Inpatient Cardiology Consult  Once        Specialty:  Cardiology  Provider:  Ghislaine Chiang MD    04/06/22 0326    04/06/22 0321  Place Sequential Compression Device  Once         04/06/22 0326    04/06/22 0321  Maintain Sequential Compression Device  Continuous         04/06/22 0326    04/06/22 0320  Intake & Output  Every Shift       04/06/22 0326    04/06/22 0320  Weigh Patient  Once         04/06/22 0326    04/06/22 0320  Oxygen Therapy- Nasal Cannula; Titrate for SPO2: 90% - 95%  Continuous         04/06/22 0326    04/06/22 0320  Insert Peripheral IV  Once         04/06/22 0326    04/06/22 0320  Saline Lock & Maintain IV Access  Continuous         04/06/22 0326    04/06/22 0320  Code Status and Medical Interventions:  Continuous         04/06/22 0326    04/06/22 0319  sodium chloride 0.9 % flush 10 mL  As Needed         04/06/22 0326    04/06/22 0310  Inpatient Admission  Once     " "    04/06/22 0310    04/06/22 0253  [facility] (on-call MD unless specified) Details  Once        Specialty:  Hospitalist  Provider:  (Not yet assigned)    04/06/22 0252 04/06/22 0252  COVID PRE-OP / PRE-PROCEDURE SCREENING ORDER (NO ISOLATION) - Swab, Nasopharynx  Once         04/06/22 0251 04/06/22 0252  COVID-19, CHRIS IN-HOUSE CEPHEID/PORFIRIO NP SWAB IN TRANSPORT MEDIA 8-12 HR TAT - Swab, Nasopharynx  PROCEDURE ONCE         04/06/22 0251 04/06/22 0133  ondansetron (ZOFRAN) injection 4 mg  Once         04/06/22 0131    04/05/22 2355  Comprehensive Metabolic Panel  Once         04/05/22 1742    04/05/22 2355  Troponin  Once         04/05/22 2250    04/05/22 2354  Green Top (Gel)  PROCEDURE ONCE         04/05/22 1742    04/05/22 2256  Insert peripheral IV  Once        \"And\" Linked Group Details    04/05/22 2255    04/05/22 2255  sodium chloride 0.9 % flush 10 mL  As Needed        \"And\" Linked Group Details    04/05/22 2255    04/05/22 2251  CT Lumbar Spine Without Contrast  1 Time Imaging         04/05/22 2250    04/05/22 2251  CT Lower Extremity Left Without Contrast  1 Time Imaging         04/05/22 2250    04/05/22 2251  Cardiac Monitoring  Once         04/05/22 2250    04/05/22 2251  Insert peripheral IV  Once        \"And\" Linked Group Details    04/05/22 2250    04/05/22 2250  sodium chloride 0.9 % flush 10 mL  As Needed        \"And\" Linked Group Details    04/05/22 2250    04/05/22 2250  ECG 12 Lead  Once         04/05/22 2250    04/05/22 2250  CT Head Without Contrast  1 Time Imaging         04/05/22 2250 04/05/22 2006  XR Knee 1 or 2 View Left  1 Time Imaging         04/05/22 2005 04/05/22 2005  XR Knee 1 or 2 View Right  1 Time Imaging,   Status:  Canceled         04/05/22 2004 04/05/22 1825  Protime-INR  Once         04/05/22 1824    04/05/22 1742  NPO Diet  Diet Effective Now,   Status:  Canceled         04/05/22 1742 04/05/22 1742  Undress & Gown  Once         04/05/22 1742 04/05/22 1742  " [place] Draw  Once         04/05/22 1742    04/05/22 1742  CBC & Differential  Once         04/05/22 1742    04/05/22 1742  Duplex Venous Lower Extremity LEFT  Once         04/05/22 1742    04/05/22 1742  Lavender Top  PROCEDURE ONCE         04/05/22 1742    04/05/22 1742  Gold Top - SST  PROCEDURE ONCE         04/05/22 1742    04/05/22 1742  Light Blue Top  PROCEDURE ONCE         04/05/22 1742    04/05/22 1742  CBC Auto Differential  PROCEDURE ONCE         04/05/22 1742                     Consult Notes (last 24 hours)      Siobhan Laird APRN at 04/06/22 0906      Consult Orders    1. Inpatient Orthopedic Surgery Consult [[id number]] ordered by Carmen Welch APRN at 04/06/22 0326                  Orthopedic Consult      Patient: Francisco Sequeira    Date of Admission: 4/5/2022 10:05 PM    YOB: 1935    Medical Record Number: [id number]    Consulting Physician: Bowen Coughlin MD    Chief Complaints: Left leg pain    History of Present Illness: 86 y.o. male admitted to [facility] to services of Bowen Coughlin MD with left leg swelling and trouble walking.  Patient was recently discharged from the hospital after cholecystectomy, aspiration pneumonia, and pleural effusion.  Patient has a history of A. fib and mechanical heart valve.  He has been taking Lovenox and Coumadin at home.    Patient's wife says the leg pain has been gradually getting worse since she has had him at home.  Patient states he had difficulty standing after going to the restroom.  He uses a cane and walker at home at baseline.  Localizes the majority of his pain to below the knee.  Denies pain at rest.  Reports his pain is moderate to severe, intermittent and sharp with movement.  Denies any alleviating factors.      Allergies:   Allergies   Allergen Reactions   • Other Other (See Comments)     Grass, ragweed   • Penicillins Swelling   • Percocet [Oxycodone-Acetaminophen] Nausea And Vomiting       Home  Medications:    Current Facility-Administered Medications:   •  acetaminophen (TYLENOL) tablet 650 mg, 650 mg, Oral, Q4H PRN, 650 mg at 04/06/22 0730 **OR** acetaminophen (TYLENOL) 160 MG/5ML solution 650 mg, 650 mg, Oral, Q4H PRN **OR** acetaminophen (TYLENOL) suppository 650 mg, 650 mg, Rectal, Q4H PRN, Carmen Welch APRN  •  calcium carbonate (TUMS) chewable tablet 500 mg (200 mg elemental), 2 tablet, Oral, BID PRN, Carmen Welch APRN  •  ondansetron (ZOFRAN) tablet 4 mg, 4 mg, Oral, Q6H PRN **OR** ondansetron (ZOFRAN) injection 4 mg, 4 mg, Intravenous, Q6H PRN, Carmen Welch APRN  •  [COMPLETED] Insert peripheral IV, , , Once **AND** sodium chloride 0.9 % flush 10 mL, 10 mL, Intravenous, PRN, Julian Dubose MD  •  [COMPLETED] Insert peripheral IV, , , Once **AND** sodium chloride 0.9 % flush 10 mL, 10 mL, Intravenous, PRN, Julian Dubose MD  •  sodium chloride 0.9 % flush 10 mL, 10 mL, Intravenous, Q12H, Carmen Welch APRN  •  sodium chloride 0.9 % flush 10 mL, 10 mL, Intravenous, PRN, Carmen Welch APRN    Current Outpatient Medications:   •  aspirin 81 MG EC tablet, Take 1 tablet by mouth Daily., Disp: 90 tablet, Rfl: 0  •  atorvastatin (LIPITOR) 40 MG tablet, Take 40 mg by mouth Daily., Disp: , Rfl:   •  digoxin (LANOXIN) 125 MCG tablet, TAKE ONE TABLET BY MOUTH DAILY (Patient taking differently: Take 62.5 mcg by mouth Daily.), Disp: 90 tablet, Rfl: 1  •  diphenhydrAMINE (BENADRYL) 25 mg capsule, Take 1 capsule by mouth 2 (Two) Times a Day As Needed for Itching, Allergies or Sleep., Disp: , Rfl:   •  enoxaparin (LOVENOX) 80 MG/0.8ML solution syringe, Discard 0.1 ml and Inject 0.7 mL under the skin into the appropriate area as directed Every 12 (Twelve) Hours. Continue until INR is therapeutic., Disp: 22.4 mL, Rfl: 6  •  famotidine (PEPCID) 20 MG tablet, Take 1 tablet by mouth 2 (Two) Times a Day Before Meals for 30 days., Disp: 60 tablet, Rfl: 0  •  ferrous  gluconate (FERGON) 324 MG tablet, Take 1 tablet by mouth Every Other Day., Disp: 90 tablet, Rfl: 1  •  furosemide (Lasix) 40 MG tablet, Take 1 tablet by mouth Daily., Disp: 90 tablet, Rfl: 1  •  Insulin Degludec (TRESIBA FLEXTOUCH SC), Inject 30-65 Units under the skin into the appropriate area as directed Daily As Needed. Use as directed if blood sugar gets too high, Disp: , Rfl:   •  Insulin Disposable Pump (V-Go 40) kit, USE AS DIRECTED THREE TIMES A DAY, Disp: 30 each, Rfl: 11  •  insulin lispro (humaLOG) 100 UNIT/ML injection, USE AS DIRECTED WITH V-GO PUMP, Disp: 30 mL, Rfl: 6  •  lactobacillus acidophilus (RISAQUAD) capsule capsule, Take 1 capsule by mouth Daily for 30 days., Disp: 30 capsule, Rfl: 0  •  magnesium oxide (MAG-OX) 400 MG tablet, Take 400 mg by mouth 2 (Two) Times a Day., Disp: , Rfl:   •  metoprolol succinate XL (TOPROL-XL) 25 MG 24 hr tablet, Take 0.5 tablets by mouth Daily for 30 days., Disp: 45 tablet, Rfl: 3  •  Omega-3 Fatty Acids (FISH OIL) 1000 MG capsule capsule, Take 4,000 mg by mouth Daily With Breakfast., Disp: , Rfl:   •  saccharomyces boulardii (FLORASTOR) 250 MG capsule, Take 1 capsule by mouth 2 (Two) Times a Day for 30 days., Disp: 60 capsule, Rfl: 0  •  vancomycin (VANCOCIN) 125 MG capsule, Take 1 capsule by mouth Every Other Day for 7 doses. 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, Disp: 7 capsule, Rfl: 0  •  warfarin (COUMADIN) 5 MG tablet, Take 1.5 tablets by mouth Every Night., Disp: 115 tablet, Rfl: 1    Current Medications:  Scheduled Meds:sodium chloride, 10 mL, Intravenous, Q12H      Continuous Infusions:   PRN Meds:.•  acetaminophen **OR** acetaminophen **OR** acetaminophen  •  calcium carbonate  •  ondansetron **OR** ondansetron  •  [COMPLETED] Insert peripheral IV **AND** sodium chloride  •  [COMPLETED] Insert peripheral IV **AND** sodium chloride  •  sodium chloride    Past Medical History:   Diagnosis Date   • Allergic rhinitis    •  Aortic valve insufficiency    • Ascending aortic aneurysm (HCC)    • Atrial fibrillation (HCC)    • Bacteremia    • Calcific aortic stenosis of bicuspid valve    • Cardiac arrest (HCC)    • Cardiomyopathy (HCC)    • CKD (chronic kidney disease) stage 3, GFR 30-59 ml/min (HCC)    • Contact dermatitis due to poison ivy    • Elbow fracture    • Esophageal reflux    • GERD (gastroesophageal reflux disease)    • Head injury    • History of transfusion    • Hyperglycemia    • Hyperlipidemia    • Hypertension    • Kidney carcinoma (HCC)    • Nonischemic cardiomyopathy (HCC)    • Renal oncocytoma    • Seasonal allergic reaction    • Sinusitis    • Syncope    • Type 2 diabetes mellitus (HCC)     uncontrolled   • Visual field defect        Past Surgical History:   Procedure Laterality Date   • AORTIC VALVE REPAIR/REPLACEMENT     • ASCENDING AORTIC ANEURYSM REPAIR W/ MECHANICAL AORTIC VALVE REPLACEMENT     • CHOLECYSTECTOMY WITH INTRAOPERATIVE CHOLANGIOGRAM N/A 3/29/2022    Procedure: Laparoscopic cholecystectomy with cholangiogram, possible open;  Surgeon: Lisa Guidry MD;  Location: [facility] MAIN OR;  Service: General;  Laterality: N/A;   • COLONOSCOPY N/A 10/28/2021    Procedure: COLONOSCOPY to cecum:  cold snare polyps,;  Surgeon: Dinesh Meza MD;  Location: [facility] ENDOSCOPY;  Service: Gastroenterology;  Laterality: N/A;  pre:  Iron deficiency anemia  post:  polyps, diverticulosis,    • COLONOSCOPY N/A 11/9/2021    Procedure: COLONOSCOPY to cecum with APC cautery of AVM and clip placement x1;  Surgeon: Pavan Rodriguez MD;  Location: [facility] ENDOSCOPY;  Service: Gastroenterology;  Laterality: N/A;  PRE - gi bleed, anemia  POST - diverticulosis, poor prep, AVM right colon   • ENDOSCOPY N/A 10/28/2021    Procedure: ESOPHAGOGASTRODUODENOSCOPY with biopsies;  Surgeon: Dinesh Meza MD;  Location: [facility] ENDOSCOPY;  Service: Gastroenterology;  Laterality: N/A;  pre:  Iron deficiency anemia  post:   duodenitis and gastritis   • EYE SURGERY  12/09/2020    cataract surgery    • NEPHRECTOMY     • OTHER SURGICAL HISTORY      elbow surgery   • OTHER SURGICAL HISTORY      right arm surgery   • PROSTATE SURGERY     • THORACENTESIS Left     diagnostic       Social History     Occupational History   • Not on file   Tobacco Use   • Smoking status: Former Smoker   • Smokeless tobacco: Former User   • Tobacco comment: caffeine use   Vaping Use   • Vaping Use: Never used   Substance and Sexual Activity   • Alcohol use: Yes     Comment: occasional   • Drug use: No   • Sexual activity: Defer      Social History     Social History Narrative   • Not on file       Family History   Problem Relation Age of Onset   • Cancer Mother         colon   • Cancer Brother         colon       Review of Systems:     Constitutional:  Denies fever, shaking or chills   Eyes:  Denies change in visual acuity   HEENT:  Denies nasal congestion or sore throat   Respiratory:  Denies cough or shortness of breath   Cardiovascular:  Denies chest pain or edema  Endocrine: Denies tremors, palpitations, intolerance of heat or cold, polyuria, polydipsia.  GI:  Denies abdominal pain, nausea, vomiting, bloody stools or diarrhea  :  Denies frequency, urgency, incontinence, retention, or nocturia.  Musculoskeletal:  Denies numbness tingling or loss of motor function except as above  Integument:  Denies rash, lesion or ulceration   Neurologic:  Denies headache or focal weakness, deficits  Heme:  Denies epistaxis, spontaneous or excessive bleeding, hematuria, melena, fatigue, enlarged or tender lymph nodes.      All other pertinent positives and negatives as noted above in HPI.    Physical Exam: 86 y.o. male    Vitals:    04/05/22 1745 04/05/22 2213 04/06/22 0047 04/06/22 0413   BP:  120/67 150/77 136/67   BP Location:   Right arm Right arm   Patient Position:   Sitting Sitting   Pulse:  86 84 84   Resp: 16 16 16 16   Temp:       TempSrc:       SpO2:  98% 100%  "100%   Weight:   70.8 kg (156 lb)    Height:   182.9 cm (72\")      General:  Awake, alert. No acute distress.      Head/Neck:  Normocephalic, atraumatic.  Conjunctivae and sclerae clear.  Hearing adequate for the exam.  Neck is supple with normal ROM.    Psych:  Affect and demeanor appropriate.    CV:  Regular rate and rhythm.  Hemodynamically stable.    Lungs:  Good chest expansion, breathing unlabored.    Abdomen:  Soft.  Nontender, nondistended.    Extremities: Left lower extremity is examined:  Skin appears benign without obvious lacerations, ulcerations or lesions.  No gross deformity of malalignment noted.  Compartments soft.  Positive for calf tenderness.  No significant atrophy.  No palpable masses or adenopathy.   knee motion is somewhat limited and uncomfortable.  No obvious instability although exam is limited due to discomfort.  Strength well-preserved distally.  Sensation to light touch grossly intact distally.  Good skin turgor, brisk cap refill and good pulses distally.    All other extremities atraumatic without gross abnormality.     Diagnostic Tests:    Admission on 04/05/2022   Component Date Value Ref Range Status   • Glucose 04/06/2022 184 (A) 65 - 99 mg/dL Final   • BUN 04/06/2022 11  8 - 23 mg/dL Final   • Creatinine 04/06/2022 1.09  0.76 - 1.27 mg/dL Final   • Sodium 04/06/2022 138  136 - 145 mmol/L Final   • Potassium 04/06/2022 4.1  3.5 - 5.2 mmol/L Final   • Chloride 04/06/2022 100  98 - 107 mmol/L Final   • CO2 04/06/2022 29.6 (A) 22.0 - 29.0 mmol/L Final   • Calcium 04/06/2022 8.5 (A) 8.6 - 10.5 mg/dL Final   • Total Protein 04/06/2022 6.3  6.0 - 8.5 g/dL Final   • Albumin 04/06/2022 2.70 (A) 3.50 - 5.20 g/dL Final   • ALT (SGPT) 04/06/2022 11  1 - 41 U/L Final   • AST (SGOT) 04/06/2022 14  1 - 40 U/L Final   • Alkaline Phosphatase 04/06/2022 131 (A) 39 - 117 U/L Final   • Total Bilirubin 04/06/2022 0.6  0.0 - 1.2 mg/dL Final   • Globulin 04/06/2022 3.6  gm/dL Final   • A/G Ratio " 04/06/2022 0.8  g/dL Final   • BUN/Creatinine Ratio 04/06/2022 10.1  7.0 - 25.0 Final   • Anion Gap 04/06/2022 8.4  5.0 - 15.0 mmol/L Final   • eGFR 04/06/2022 66.9  >60.0 mL/min/1.73 Final    National Kidney Foundation and American Society of Nephrology (ASN) Task Force recommended calculation based on the Chronic Kidney Disease Epidemiology Collaboration (CKD-EPI) equation refit without adjustment for race.   • Target HR (85%) 04/05/2022 116  bpm Final   • Max. Pred. HR (100%) 04/05/2022 136  bpm Final   • Right Common Femoral Spont 04/05/2022 Y   Final   • Right Common Femoral Phasic 04/05/2022 Y   Final   • Right Common Femoral Augment 04/05/2022 Y   Final   • Right Common Femoral Competent 04/05/2022 Y   Final   • Right Common Femoral Compress 04/05/2022 C   Final   • Left Common Femoral Spont 04/05/2022 Y   Final   • Left Common Femoral Phasic 04/05/2022 Y   Final   • Left Common Femoral Augment 04/05/2022 Y   Final   • Left Common Femoral Competent 04/05/2022 Y   Final   • Left Common Femoral Compress 04/05/2022 C   Final   • Left Saphenofemoral Junction Compr* 04/05/2022 C   Final   • Left Profunda Femoral Compress 04/05/2022 C   Final   • Left Proximal Femoral Compress 04/05/2022 C   Final   • Left Mid Femoral Spont 04/05/2022 Y   Final   • Left Mid Femoral Phasic 04/05/2022 Y   Final   • Left Mid Femoral Augment 04/05/2022 Y   Final   • Left Mid Femoral Competent 04/05/2022 Y   Final   • Left Mid Femoral Compress 04/05/2022 C   Final   • Left Distal Femoral Compress 04/05/2022 C   Final   • Left Popliteal Spont 04/05/2022 Y   Final   • Left Popliteal Phasic 04/05/2022 Y   Final   • Left Popliteal Augment 04/05/2022 Y   Final   • Left Popliteal Competent 04/05/2022 Y   Final   • Left Popliteal Compress 04/05/2022 C   Final   • Left Posterior Tibial Compress 04/05/2022 C   Final   • Left Peroneal Compress 04/05/2022 C   Final   • Left Gastronemius Compress 04/05/2022 C   Final   • Left Greater Saph AK  Compress 04/05/2022 C   Final   • Left Greater Saph BK Compress 04/05/2022 C   Final   • Left Lesser Saph Compress 04/05/2022 C   Final   • Extra Tube 04/06/2022 Hold for add-ons.   Final    Auto resulted.   • Extra Tube 04/05/2022 hold for add-on   Final    Auto resulted   • Extra Tube 04/05/2022 hold for add-on   Final    Auto resulted   • WBC 04/05/2022 7.51  3.40 - 10.80 10*3/mm3 Final   • RBC 04/05/2022 3.29 (A) 4.14 - 5.80 10*6/mm3 Final   • Hemoglobin 04/05/2022 8.6 (A) 13.0 - 17.7 g/dL Final   • Hematocrit 04/05/2022 27.2 (A) 37.5 - 51.0 % Final   • MCV 04/05/2022 82.7  79.0 - 97.0 fL Final   • MCH 04/05/2022 26.1 (A) 26.6 - 33.0 pg Final   • MCHC 04/05/2022 31.6  31.5 - 35.7 g/dL Final   • RDW 04/05/2022 17.0 (A) 12.3 - 15.4 % Final   • RDW-SD 04/05/2022 51.1  37.0 - 54.0 fl Final   • MPV 04/05/2022 12.4 (A) 6.0 - 12.0 fL Final   • Platelets 04/05/2022 242  140 - 450 10*3/mm3 Final   • Neutrophil % 04/05/2022 83.1 (A) 42.7 - 76.0 % Final   • Lymphocyte % 04/05/2022 9.5 (A) 19.6 - 45.3 % Final   • Monocyte % 04/05/2022 6.0  5.0 - 12.0 % Final   • Eosinophil % 04/05/2022 0.7  0.3 - 6.2 % Final   • Basophil % 04/05/2022 0.3  0.0 - 1.5 % Final   • Immature Grans % 04/05/2022 0.4  0.0 - 0.5 % Final   • Neutrophils, Absolute 04/05/2022 6.25  1.70 - 7.00 10*3/mm3 Final   • Lymphocytes, Absolute 04/05/2022 0.71  0.70 - 3.10 10*3/mm3 Final   • Monocytes, Absolute 04/05/2022 0.45  0.10 - 0.90 10*3/mm3 Final   • Eosinophils, Absolute 04/05/2022 0.05  0.00 - 0.40 10*3/mm3 Final   • Basophils, Absolute 04/05/2022 0.02  0.00 - 0.20 10*3/mm3 Final   • Immature Grans, Absolute 04/05/2022 0.03  0.00 - 0.05 10*3/mm3 Final   • nRBC 04/05/2022 0.0  0.0 - 0.2 /100 WBC Final   • Protime 04/05/2022 15.1 (A) 11.7 - 14.2 Seconds Final   • INR 04/05/2022 1.20 (A) 0.90 - 1.10 Final   • Troponin T 04/06/2022 0.030  0.000 - 0.030 ng/mL Final   • QT Interval 04/05/2022 391  ms Preliminary   • COVID19 04/06/2022 Not Detected  Not  Detected - Ref. Range Final   Anticoagulation Visit on 04/05/2022   Component Date Value Ref Range Status   • INR 04/05/2022 1.30   Final     Lab Results (last 24 hours)     Procedure Component Value Units Date/Time    COVID PRE-OP / PRE-PROCEDURE SCREENING ORDER (NO ISOLATION) - Swab, Nasopharynx [[id number]]  (Normal) Collected: 04/06/22 0421    Specimen: Swab from Nasopharynx Updated: 04/06/22 0506    Narrative:      The following orders were created for panel order COVID PRE-OP / PRE-PROCEDURE SCREENING ORDER (NO ISOLATION) - Swab, Nasopharynx.  Procedure                               Abnormality         Status                     ---------                               -----------         ------                     COVID-19,[facility]...[[id number]]  Normal              Final result                 Please view results for these tests on the individual orders.    COVID-19,[facility] CEPHEID/PORFIRIO NP SWAB IN TRANSPORT MEDIA 8-12 HR TAT - Swab, Nasopharynx [[id number]]  (Normal) Collected: 04/06/22 0421    Specimen: Swab from Nasopharynx Updated: 04/06/22 0506     COVID19 Not Detected    Narrative:      Fact sheet for providers: https://www.fda.gov/media/257995/download     Fact sheet for patients: https://www.fda.gov/media/236208/download    [place] Draw [[id number]] Collected: 04/05/22 2304    Specimen: Blood Updated: 04/06/22 0117    Narrative:      The following orders were created for panel order [place] Draw.  Procedure                               Abnormality         Status                     ---------                               -----------         ------                     Green Top (Gel)[[id number]]                                  Final result               Lavender Top[[id number]]                                     Final result               Gold Top - SST[[id number]]                                                              Light Blue Top[[id number]]                                   Final  result                 Please view results for these tests on the individual orders.    Green Top (Gel) [[id number]] Collected: 04/06/22 0002    Specimen: Blood Updated: 04/06/22 0117     Extra Tube Hold for add-ons.     Comment: Auto resulted.       Comprehensive Metabolic Panel [[id number]]  (Abnormal) Collected: 04/06/22 0002    Specimen: Blood Updated: 04/06/22 0047     Glucose 184 mg/dL      BUN 11 mg/dL      Creatinine 1.09 mg/dL      Sodium 138 mmol/L      Potassium 4.1 mmol/L      Chloride 100 mmol/L      CO2 29.6 mmol/L      Calcium 8.5 mg/dL      Total Protein 6.3 g/dL      Albumin 2.70 g/dL      ALT (SGPT) 11 U/L      AST (SGOT) 14 U/L      Alkaline Phosphatase 131 U/L      Total Bilirubin 0.6 mg/dL      Globulin 3.6 gm/dL      A/G Ratio 0.8 g/dL      BUN/Creatinine Ratio 10.1     Anion Gap 8.4 mmol/L      eGFR 66.9 mL/min/1.73      Comment: National Kidney Foundation and American Society of Nephrology (ASN) Task Force recommended calculation based on the Chronic Kidney Disease Epidemiology Collaboration (CKD-EPI) equation refit without adjustment for race.       Narrative:      GFR Normal >60  Chronic Kidney Disease <60  Kidney Failure <15      Troponin [[id number]]  (Normal) Collected: 04/06/22 0002    Specimen: Blood Updated: 04/06/22 0035     Troponin T 0.030 ng/mL     Narrative:      Troponin T Reference Range:  <= 0.03 ng/mL-   Negative for AMI  >0.03 ng/mL-     Abnormal for myocardial necrosis.  Clinicians would have to utilize clinical acumen, EKG, Troponin and serial changes to determine if it is an Acute Myocardial Infarction or myocardial injury due to an underlying chronic condition.       Results may be falsely decreased if patient taking Biotin.      Light Blue Top [[id number]] Collected: 04/05/22 2304    Specimen: Blood Updated: 04/06/22 0017     Extra Tube hold for add-on     Comment: Auto resulted       Lavender Top [[id number]] Collected: 04/05/22 2304    Specimen: Blood Updated: 04/06/22  0017     Extra Tube hold for add-on     Comment: Auto resulted       CBC & Differential [[id number]]  (Abnormal) Collected: 04/05/22 2304    Specimen: Blood Updated: 04/05/22 2336    Narrative:      The following orders were created for panel order CBC & Differential.  Procedure                               Abnormality         Status                     ---------                               -----------         ------                     CBC Auto Differential[[id number]]        Abnormal            Final result                 Please view results for these tests on the individual orders.    CBC Auto Differential [[id number]]  (Abnormal) Collected: 04/05/22 2304    Specimen: Blood Updated: 04/05/22 2336     WBC 7.51 10*3/mm3      RBC 3.29 10*6/mm3      Hemoglobin 8.6 g/dL      Hematocrit 27.2 %      MCV 82.7 fL      MCH 26.1 pg      MCHC 31.6 g/dL      RDW 17.0 %      RDW-SD 51.1 fl      MPV 12.4 fL      Platelets 242 10*3/mm3      Neutrophil % 83.1 %      Lymphocyte % 9.5 %      Monocyte % 6.0 %      Eosinophil % 0.7 %      Basophil % 0.3 %      Immature Grans % 0.4 %      Neutrophils, Absolute 6.25 10*3/mm3      Lymphocytes, Absolute 0.71 10*3/mm3      Monocytes, Absolute 0.45 10*3/mm3      Eosinophils, Absolute 0.05 10*3/mm3      Basophils, Absolute 0.02 10*3/mm3      Immature Grans, Absolute 0.03 10*3/mm3      nRBC 0.0 /100 WBC     Protime-INR [[id number]]  (Abnormal) Collected: 04/05/22 2304    Specimen: Blood Updated: 04/05/22 2325     Protime 15.1 Seconds      INR 1.20          Imaging:   XR KNEE 1 OR 2 VW LEFT-     Clinical: Left knee pain     FINDINGS: Medial and lateral compartments preserved. There is patellofemoral joint narrowing. Vascular arterial calcifications within the soft tissues. No bone lesion, osteochondral defect, fracture or dislocation seen.     CONCLUSION: Patellofemoral joint degeneration. No acute osseous or  articular abnormality.     This report was finalized on 4/5/2022 8:54 PM by   Adrian Brand M.D    CT LEFT LOWER EXTREMITY  IMPRESSION:     1. No acute osseous findings.  2. Both the patellar tendon and quadriceps tendon appear to be intact. If concern persists for soft tissue injury, consideration for MRI is suggested.  3. Diffuse left lower extremity edema.    This report was finalized on 4/6/2022 2:27 AM by Dr. Yue Frye M.D.    CT LUMBAR SPINE  IMPRESSION:     1. No acute osseous findings.  2. Degenerative changes in the spine, as noted above.  3. Asymmetric enlargement of the patient's left iliacus and left iliopsoas muscle, incompletely assessed on this exam. Given history of anticoagulation, this may represent hemorrhage. There are areas of more central low attenuation, which could reflect hematoma liquefaction, but correlation with any evidence of infection is recommended. The hemorrhage does abut the patient's left psoas muscle as well, and there is also some stranding and fluid seen within the left perivesical space.      This report was finalized on 4/6/2022 2:43 AM by Dr. Yue Frye M.D.    Duplex Venous Lower Left Extremity  Study Impression:    •     Right Common Femoral:  No deep vein thrombosis noted.   •     Left Common Femoral: No deep vein thrombosis noted.   •     Left Saphenofemoral Junction: No superficial thrombophlebitis noted.   •     Left Proximal Femoral: No deep vein thrombosis noted.   •     Left Mid Femoral: No deep vein thrombosis noted.   •     Left Distal Femoral: No deep vein thrombosis noted.   •     Left Popliteal: No deep vein thrombosis noted.   •     Left Posterior Tibial: No deep vein thrombosis noted.    •     Left Peroneal: No deep vein thrombosis noted.   •     Left Gastrocnemius: No deep vein thrombosis noted.    •     Left Great Saphenous Above Knee: No superficial thrombophlebitis noted.   •     Left Great Saphenous Below Knee: No superficial thrombophlebitis noted.       Assessment:  1.  Left lower leg pain   2.  Left iliacus  hematoma    Plan:  May continue Tylenol for pain control measures.  Recommend ice to affected area.  May use SCD on right lower leg.  Recommend holding anticoagulation, but I will defer this decision to cardiology.  I will sign off for now, but am happy to follow up as needed.      Date: 4/6/2022    LIZA Goodwin    CC: Bowen Coughlin MD        Electronically signed by Siobhan Laird APRN at 04/06/22 0945

## 2022-04-06 NOTE — ED NOTES
"Nursing report ED to floor  Francisco Sequeira  86 y.o.  male    HPI :   Chief Complaint   Patient presents with   • Leg Swelling       Admitting doctor:   Bruce Heath MD    Admitting diagnosis:   The primary encounter diagnosis was Hematoma of iliopsoas muscle, left, initial encounter. Diagnoses of Chronic anticoagulation, H/O heart valve replacement with mechanical valve, Immobility syndrome, Anemia, unspecified type, and History of atrial fibrillation were also pertinent to this visit.    Code status:   Current Code Status     Date Active Code Status Order ID Comments User Context       4/6/2022 0327 CPR (Attempt to Resuscitate) [id number]  Carmen Welch APRN ED     Advance Care Planning Activity      Questions for Current Code Status     Question Answer    Code Status (Patient has no pulse and is not breathing) CPR (Attempt to Resuscitate)    Medical Interventions (Patient has pulse or is breathing) Full Support          Allergies:   Other, Penicillins, and Percocet [oxycodone-acetaminophen]    Intake and Output  No intake or output data in the 24 hours ending 04/06/22 0411    Weight:       04/06/22  0047   Weight: 70.8 kg (156 lb)       Most recent vitals:   Vitals:    04/05/22 1733 04/05/22 1745 04/05/22 2213 04/06/22 0047   BP: 103/57  120/67 150/77   BP Location:    Right arm   Patient Position:    Sitting   Pulse: 84  86 84   Resp:  16 16 16   Temp: 97.2 °F (36.2 °C)      TempSrc: Tympanic      SpO2: 98%  98% 100%   Weight:    70.8 kg (156 lb)   Height:    182.9 cm (72\")       Active LDAs/IV Access:   Lines, Drains & Airways     Active LDAs     Name Placement date Placement time Site Days    Peripheral IV 04/05/22 2305 Left Hand 04/05/22  2305  Hand  less than 1                Labs (abnormal labs have a star):   Labs Reviewed   COMPREHENSIVE METABOLIC PANEL - Abnormal; Notable for the following components:       Result Value    Glucose 184 (*)     CO2 29.6 (*)     Calcium 8.5 (*)     Albumin 2.70 " (*)     Alkaline Phosphatase 131 (*)     All other components within normal limits    Narrative:     GFR Normal >60  Chronic Kidney Disease <60  Kidney Failure <15     CBC WITH AUTO DIFFERENTIAL - Abnormal; Notable for the following components:    RBC 3.29 (*)     Hemoglobin 8.6 (*)     Hematocrit 27.2 (*)     MCH 26.1 (*)     RDW 17.0 (*)     MPV 12.4 (*)     Neutrophil % 83.1 (*)     Lymphocyte % 9.5 (*)     All other components within normal limits   PROTIME-INR - Abnormal; Notable for the following components:    Protime 15.1 (*)     INR 1.20 (*)     All other components within normal limits   TROPONIN (IN-HOUSE) - Normal    Narrative:     Troponin T Reference Range:  <= 0.03 ng/mL-   Negative for AMI  >0.03 ng/mL-     Abnormal for myocardial necrosis.  Clinicians would have to utilize clinical acumen, EKG, Troponin and serial changes to determine if it is an Acute Myocardial Infarction or myocardial injury due to an underlying chronic condition.       Results may be falsely decreased if patient taking Biotin.     COVID PRE-OP / PRE-PROCEDURE SCREENING ORDER (NO ISOLATION)    Narrative:     The following orders were created for panel order COVID PRE-OP / PRE-PROCEDURE SCREENING ORDER (NO ISOLATION) - Swab, Nasopharynx.  Procedure                               Abnormality         Status                     ---------                               -----------         ------                     COVID-19, [facility]...[[id number]]                                                   Please view results for these tests on the individual orders.   COVID-19, [facility] CEPHEID/PORFIRIO, NP SWAB IN TRANSPORT MEDIA 8-12 HR TAT   RAINBOW DRAW    Narrative:     The following orders were created for panel order [place] Draw.  Procedure                               Abnormality         Status                     ---------                               -----------         ------                     Green Top (Gel)[[id number]]                                   Final result               Lavender Top[[id number]]                                     Final result               Gold Top - SST[[id number]]                                                              Light Blue Top[[id number]]                                   Final result                 Please view results for these tests on the individual orders.   BASIC METABOLIC PANEL   CBC (NO DIFF)   HEMOGLOBIN AND HEMATOCRIT, BLOOD   CBC AND DIFFERENTIAL    Narrative:     The following orders were created for panel order CBC & Differential.  Procedure                               Abnormality         Status                     ---------                               -----------         ------                     CBC Auto Differential[[id number]]        Abnormal            Final result                 Please view results for these tests on the individual orders.   GREEN TOP   LAVENDER TOP   LIGHT BLUE TOP   GOLD TOP - SST       EKG:   ECG 12 Lead   Preliminary Result   HEART RATE= 90  bpm   RR Interval= 666  ms   CA Interval=   ms   P Horizontal Axis=   deg   P Front Axis=   deg   QRSD Interval= 126  ms   QT Interval= 391  ms   QRS Axis= -43  deg   T Wave Axis= 90  deg   - ABNORMAL ECG -   Atrial fibrillation   Nonspecific IVCD with LAD   LVH with secondary repolarization abnormality   Anterior Q waves, possibly due to LVH   Electronically Signed By:    Date and Time of Study: 2022-04-05 23:15:46          Meds given in ED:   Medications   sodium chloride 0.9 % flush 10 mL (has no administration in time range)   sodium chloride 0.9 % flush 10 mL (has no administration in time range)   sodium chloride 0.9 % flush 10 mL (has no administration in time range)   sodium chloride 0.9 % flush 10 mL (has no administration in time range)   acetaminophen (TYLENOL) tablet 650 mg (has no administration in time range)     Or   acetaminophen (TYLENOL) 160 MG/5ML solution 650 mg (has no administration in time  range)     Or   acetaminophen (TYLENOL) suppository 650 mg (has no administration in time range)   ondansetron (ZOFRAN) tablet 4 mg (has no administration in time range)     Or   ondansetron (ZOFRAN) injection 4 mg (has no administration in time range)   calcium carbonate (TUMS) chewable tablet 500 mg (200 mg elemental) (has no administration in time range)   ondansetron (ZOFRAN) injection 4 mg (4 mg Intravenous Given 4/6/22 0135)       Imaging results:  CT Head Without Contrast    Result Date: 4/6/2022  No acute intracranial findings.  Radiation dose reduction techniques were utilized, including automated exposure control and exposure modulation based on body size.  This report was finalized on 4/6/2022 2:18 AM by Dr. Yue Frye M.D.      CT Lumbar Spine Without Contrast    Result Date: 4/6/2022   1. No acute osseous findings. 2. Degenerative changes in the spine, as noted above. 3. Asymmetric enlargement of the patient's left iliacus and left iliopsoas muscle, incompletely assessed on this exam. Given history of anticoagulation, this may represent hemorrhage. There are areas of more central low attenuation, which could reflect hematoma liquefaction, but correlation with any evidence of infection is recommended. The hemorrhage does abut the patient's left psoas muscle as well, and there is also some stranding and fluid seen within the left perivesical space.  FINDINGS were discussed with Dr. Dubose at 2:40 AM.  Radiation dose reduction techniques were utilized, including automated exposure control and exposure modulation based on body size.  This report was finalized on 4/6/2022 2:43 AM by Dr. Yue Frye M.D.      CT Lower Extremity Left Without Contrast    Result Date: 4/6/2022   1. No acute osseous findings. 2. Both the patellar tendon and quadriceps tendon appear to be intact. If concern persists for soft tissue injury, consideration for MRI is suggested. 3. Diffuse left lower extremity edema.   Radiation dose reduction techniques were utilized, including automated exposure control and exposure modulation based on body size.  This report was finalized on 4/6/2022 2:27 AM by Dr. Yue Frye M.D.        Ambulatory status:   - with assist     Social issues:   Social History     Socioeconomic History   • Marital status:    Tobacco Use   • Smoking status: Former Smoker   • Smokeless tobacco: Former User   • Tobacco comment: caffeine use   Vaping Use   • Vaping Use: Never used   Substance and Sexual Activity   • Alcohol use: Yes     Comment: occasional   • Drug use: No   • Sexual activity: Defer       NIH Stroke Scale:        Nursing report ED to floor:

## 2022-04-06 NOTE — CONSULTS
Orthopedic Consult      Patient: Francisco Sequeira    Date of Admission: 4/5/2022 10:05 PM    YOB: 1935    Medical Record Number: [id number]    Consulting Physician: Bowen Coughlin MD    Chief Complaints: Left leg pain    History of Present Illness: 86 y.o. male admitted to [facility] to services of Bowen Coughlin MD with left leg swelling and trouble walking.  Patient was recently discharged from the hospital after cholecystectomy, aspiration pneumonia, and pleural effusion.  Patient has a history of A. fib and mechanical heart valve.  He has been taking Lovenox and Coumadin at home.    Patient's wife says the leg pain has been gradually getting worse since she has had him at home.  Patient states he had difficulty standing after going to the restroom.  He uses a cane and walker at home at baseline.  Localizes the majority of his pain to below the knee.  Denies pain at rest.  Reports his pain is moderate to severe, intermittent and sharp with movement.  Denies any alleviating factors.      Allergies:   Allergies   Allergen Reactions   • Other Other (See Comments)     Grass, ragweed   • Penicillins Swelling   • Percocet [Oxycodone-Acetaminophen] Nausea And Vomiting       Home Medications:    Current Facility-Administered Medications:   •  acetaminophen (TYLENOL) tablet 650 mg, 650 mg, Oral, Q4H PRN, 650 mg at 04/06/22 0730 **OR** acetaminophen (TYLENOL) 160 MG/5ML solution 650 mg, 650 mg, Oral, Q4H PRN **OR** acetaminophen (TYLENOL) suppository 650 mg, 650 mg, Rectal, Q4H PRN, Carmen Welch APRN  •  calcium carbonate (TUMS) chewable tablet 500 mg (200 mg elemental), 2 tablet, Oral, BID PRN, Carmen Welch APRN  •  ondansetron (ZOFRAN) tablet 4 mg, 4 mg, Oral, Q6H PRN **OR** ondansetron (ZOFRAN) injection 4 mg, 4 mg, Intravenous, Q6H PRN, Carmen Welch APRN  •  [COMPLETED] Insert peripheral IV, , , Once **AND** sodium chloride 0.9 % flush 10 mL, 10 mL,  Intravenous, PRN, Julian Dubose MD  •  [COMPLETED] Insert peripheral IV, , , Once **AND** sodium chloride 0.9 % flush 10 mL, 10 mL, Intravenous, PRN, Julian Dubose MD  •  sodium chloride 0.9 % flush 10 mL, 10 mL, Intravenous, Q12H, Carmen Welch APRN  •  sodium chloride 0.9 % flush 10 mL, 10 mL, Intravenous, PRN, Carmen Welch APRN    Current Outpatient Medications:   •  aspirin 81 MG EC tablet, Take 1 tablet by mouth Daily., Disp: 90 tablet, Rfl: 0  •  atorvastatin (LIPITOR) 40 MG tablet, Take 40 mg by mouth Daily., Disp: , Rfl:   •  digoxin (LANOXIN) 125 MCG tablet, TAKE ONE TABLET BY MOUTH DAILY (Patient taking differently: Take 62.5 mcg by mouth Daily.), Disp: 90 tablet, Rfl: 1  •  diphenhydrAMINE (BENADRYL) 25 mg capsule, Take 1 capsule by mouth 2 (Two) Times a Day As Needed for Itching, Allergies or Sleep., Disp: , Rfl:   •  enoxaparin (LOVENOX) 80 MG/0.8ML solution syringe, Discard 0.1 ml and Inject 0.7 mL under the skin into the appropriate area as directed Every 12 (Twelve) Hours. Continue until INR is therapeutic., Disp: 22.4 mL, Rfl: 6  •  famotidine (PEPCID) 20 MG tablet, Take 1 tablet by mouth 2 (Two) Times a Day Before Meals for 30 days., Disp: 60 tablet, Rfl: 0  •  ferrous gluconate (FERGON) 324 MG tablet, Take 1 tablet by mouth Every Other Day., Disp: 90 tablet, Rfl: 1  •  furosemide (Lasix) 40 MG tablet, Take 1 tablet by mouth Daily., Disp: 90 tablet, Rfl: 1  •  Insulin Degludec (TRESIBA FLEXTOUCH SC), Inject 30-65 Units under the skin into the appropriate area as directed Daily As Needed. Use as directed if blood sugar gets too high, Disp: , Rfl:   •  Insulin Disposable Pump (V-Go 40) kit, USE AS DIRECTED THREE TIMES A DAY, Disp: 30 each, Rfl: 11  •  insulin lispro (humaLOG) 100 UNIT/ML injection, USE AS DIRECTED WITH V-GO PUMP, Disp: 30 mL, Rfl: 6  •  lactobacillus acidophilus (RISAQUAD) capsule capsule, Take 1 capsule by mouth Daily for 30 days., Disp: 30 capsule, Rfl: 0  •   magnesium oxide (MAG-OX) 400 MG tablet, Take 400 mg by mouth 2 (Two) Times a Day., Disp: , Rfl:   •  metoprolol succinate XL (TOPROL-XL) 25 MG 24 hr tablet, Take 0.5 tablets by mouth Daily for 30 days., Disp: 45 tablet, Rfl: 3  •  Omega-3 Fatty Acids (FISH OIL) 1000 MG capsule capsule, Take 4,000 mg by mouth Daily With Breakfast., Disp: , Rfl:   •  saccharomyces boulardii (FLORASTOR) 250 MG capsule, Take 1 capsule by mouth 2 (Two) Times a Day for 30 days., Disp: 60 capsule, Rfl: 0  •  vancomycin (VANCOCIN) 125 MG capsule, Take 1 capsule by mouth Every Other Day for 7 doses. 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, Disp: 7 capsule, Rfl: 0  •  warfarin (COUMADIN) 5 MG tablet, Take 1.5 tablets by mouth Every Night., Disp: 115 tablet, Rfl: 1    Current Medications:  Scheduled Meds:sodium chloride, 10 mL, Intravenous, Q12H      Continuous Infusions:   PRN Meds:.•  acetaminophen **OR** acetaminophen **OR** acetaminophen  •  calcium carbonate  •  ondansetron **OR** ondansetron  •  [COMPLETED] Insert peripheral IV **AND** sodium chloride  •  [COMPLETED] Insert peripheral IV **AND** sodium chloride  •  sodium chloride    Past Medical History:   Diagnosis Date   • Allergic rhinitis    • Aortic valve insufficiency    • Ascending aortic aneurysm (HCC)    • Atrial fibrillation (HCC)    • Bacteremia    • Calcific aortic stenosis of bicuspid valve    • Cardiac arrest (HCC)    • Cardiomyopathy (HCC)    • CKD (chronic kidney disease) stage 3, GFR 30-59 ml/min (HCC)    • Contact dermatitis due to poison ivy    • Elbow fracture    • Esophageal reflux    • GERD (gastroesophageal reflux disease)    • Head injury    • History of transfusion    • Hyperglycemia    • Hyperlipidemia    • Hypertension    • Kidney carcinoma (HCC)    • Nonischemic cardiomyopathy (HCC)    • Renal oncocytoma    • Seasonal allergic reaction    • Sinusitis    • Syncope    • Type 2 diabetes mellitus (HCC)     uncontrolled   • Visual  field defect        Past Surgical History:   Procedure Laterality Date   • AORTIC VALVE REPAIR/REPLACEMENT     • ASCENDING AORTIC ANEURYSM REPAIR W/ MECHANICAL AORTIC VALVE REPLACEMENT     • CHOLECYSTECTOMY WITH INTRAOPERATIVE CHOLANGIOGRAM N/A 3/29/2022    Procedure: Laparoscopic cholecystectomy with cholangiogram, possible open;  Surgeon: Lisa Guidry MD;  Location: [facility] MAIN OR;  Service: General;  Laterality: N/A;   • COLONOSCOPY N/A 10/28/2021    Procedure: COLONOSCOPY to cecum:  cold snare polyps,;  Surgeon: Dinesh Meza MD;  Location: [facility] ENDOSCOPY;  Service: Gastroenterology;  Laterality: N/A;  pre:  Iron deficiency anemia  post:  polyps, diverticulosis,    • COLONOSCOPY N/A 11/9/2021    Procedure: COLONOSCOPY to cecum with APC cautery of AVM and clip placement x1;  Surgeon: Pavan Rodriguez MD;  Location: [facility] ENDOSCOPY;  Service: Gastroenterology;  Laterality: N/A;  PRE - gi bleed, anemia  POST - diverticulosis, poor prep, AVM right colon   • ENDOSCOPY N/A 10/28/2021    Procedure: ESOPHAGOGASTRODUODENOSCOPY with biopsies;  Surgeon: Dinesh Meza MD;  Location: [facility] ENDOSCOPY;  Service: Gastroenterology;  Laterality: N/A;  pre:  Iron deficiency anemia  post:  duodenitis and gastritis   • EYE SURGERY  12/09/2020    cataract surgery    • NEPHRECTOMY     • OTHER SURGICAL HISTORY      elbow surgery   • OTHER SURGICAL HISTORY      right arm surgery   • PROSTATE SURGERY     • THORACENTESIS Left     diagnostic       Social History     Occupational History   • Not on file   Tobacco Use   • Smoking status: Former Smoker   • Smokeless tobacco: Former User   • Tobacco comment: caffeine use   Vaping Use   • Vaping Use: Never used   Substance and Sexual Activity   • Alcohol use: Yes     Comment: occasional   • Drug use: No   • Sexual activity: Defer      Social History     Social History Narrative   • Not on file       Family History   Problem Relation Age of Onset   • Cancer Mother   "       colon   • Cancer Brother         colon       Review of Systems:     Constitutional:  Denies fever, shaking or chills   Eyes:  Denies change in visual acuity   HEENT:  Denies nasal congestion or sore throat   Respiratory:  Denies cough or shortness of breath   Cardiovascular:  Denies chest pain or edema  Endocrine: Denies tremors, palpitations, intolerance of heat or cold, polyuria, polydipsia.  GI:  Denies abdominal pain, nausea, vomiting, bloody stools or diarrhea  :  Denies frequency, urgency, incontinence, retention, or nocturia.  Musculoskeletal:  Denies numbness tingling or loss of motor function except as above  Integument:  Denies rash, lesion or ulceration   Neurologic:  Denies headache or focal weakness, deficits  Heme:  Denies epistaxis, spontaneous or excessive bleeding, hematuria, melena, fatigue, enlarged or tender lymph nodes.      All other pertinent positives and negatives as noted above in HPI.    Physical Exam: 86 y.o. male    Vitals:    04/05/22 1745 04/05/22 2213 04/06/22 0047 04/06/22 0413   BP:  120/67 150/77 136/67   BP Location:   Right arm Right arm   Patient Position:   Sitting Sitting   Pulse:  86 84 84   Resp: 16 16 16 16   Temp:       TempSrc:       SpO2:  98% 100% 100%   Weight:   70.8 kg (156 lb)    Height:   182.9 cm (72\")      General:  Awake, alert. No acute distress.      Head/Neck:  Normocephalic, atraumatic.  Conjunctivae and sclerae clear.  Hearing adequate for the exam.  Neck is supple with normal ROM.    Psych:  Affect and demeanor appropriate.    CV:  Regular rate and rhythm.  Hemodynamically stable.    Lungs:  Good chest expansion, breathing unlabored.    Abdomen:  Soft.  Nontender, nondistended.    Extremities: Left lower extremity is examined:  Skin appears benign without obvious lacerations, ulcerations or lesions.  No gross deformity of malalignment noted.  Compartments soft.  Positive for calf tenderness.  No significant atrophy.  No palpable masses or " adenopathy.   knee motion is somewhat limited and uncomfortable.  No obvious instability although exam is limited due to discomfort.  Strength well-preserved distally.  Sensation to light touch grossly intact distally.  Good skin turgor, brisk cap refill and good pulses distally.    All other extremities atraumatic without gross abnormality.     Diagnostic Tests:    Admission on 04/05/2022   Component Date Value Ref Range Status   • Glucose 04/06/2022 184 (A) 65 - 99 mg/dL Final   • BUN 04/06/2022 11  8 - 23 mg/dL Final   • Creatinine 04/06/2022 1.09  0.76 - 1.27 mg/dL Final   • Sodium 04/06/2022 138  136 - 145 mmol/L Final   • Potassium 04/06/2022 4.1  3.5 - 5.2 mmol/L Final   • Chloride 04/06/2022 100  98 - 107 mmol/L Final   • CO2 04/06/2022 29.6 (A) 22.0 - 29.0 mmol/L Final   • Calcium 04/06/2022 8.5 (A) 8.6 - 10.5 mg/dL Final   • Total Protein 04/06/2022 6.3  6.0 - 8.5 g/dL Final   • Albumin 04/06/2022 2.70 (A) 3.50 - 5.20 g/dL Final   • ALT (SGPT) 04/06/2022 11  1 - 41 U/L Final   • AST (SGOT) 04/06/2022 14  1 - 40 U/L Final   • Alkaline Phosphatase 04/06/2022 131 (A) 39 - 117 U/L Final   • Total Bilirubin 04/06/2022 0.6  0.0 - 1.2 mg/dL Final   • Globulin 04/06/2022 3.6  gm/dL Final   • A/G Ratio 04/06/2022 0.8  g/dL Final   • BUN/Creatinine Ratio 04/06/2022 10.1  7.0 - 25.0 Final   • Anion Gap 04/06/2022 8.4  5.0 - 15.0 mmol/L Final   • eGFR 04/06/2022 66.9  >60.0 mL/min/1.73 Final    National Kidney Foundation and American Society of Nephrology (ASN) Task Force recommended calculation based on the Chronic Kidney Disease Epidemiology Collaboration (CKD-EPI) equation refit without adjustment for race.   • Target HR (85%) 04/05/2022 116  bpm Final   • Max. Pred. HR (100%) 04/05/2022 136  bpm Final   • Right Common Femoral Spont 04/05/2022 Y   Final   • Right Common Femoral Phasic 04/05/2022 Y   Final   • Right Common Femoral Augment 04/05/2022 Y   Final   • Right Common Femoral Competent 04/05/2022 Y   Final    • Right Common Femoral Compress 04/05/2022 C   Final   • Left Common Femoral Spont 04/05/2022 Y   Final   • Left Common Femoral Phasic 04/05/2022 Y   Final   • Left Common Femoral Augment 04/05/2022 Y   Final   • Left Common Femoral Competent 04/05/2022 Y   Final   • Left Common Femoral Compress 04/05/2022 C   Final   • Left Saphenofemoral Junction Compr* 04/05/2022 C   Final   • Left Profunda Femoral Compress 04/05/2022 C   Final   • Left Proximal Femoral Compress 04/05/2022 C   Final   • Left Mid Femoral Spont 04/05/2022 Y   Final   • Left Mid Femoral Phasic 04/05/2022 Y   Final   • Left Mid Femoral Augment 04/05/2022 Y   Final   • Left Mid Femoral Competent 04/05/2022 Y   Final   • Left Mid Femoral Compress 04/05/2022 C   Final   • Left Distal Femoral Compress 04/05/2022 C   Final   • Left Popliteal Spont 04/05/2022 Y   Final   • Left Popliteal Phasic 04/05/2022 Y   Final   • Left Popliteal Augment 04/05/2022 Y   Final   • Left Popliteal Competent 04/05/2022 Y   Final   • Left Popliteal Compress 04/05/2022 C   Final   • Left Posterior Tibial Compress 04/05/2022 C   Final   • Left Peroneal Compress 04/05/2022 C   Final   • Left Gastronemius Compress 04/05/2022 C   Final   • Left Greater Saph AK Compress 04/05/2022 C   Final   • Left Greater Saph BK Compress 04/05/2022 C   Final   • Left Lesser Saph Compress 04/05/2022 C   Final   • Extra Tube 04/06/2022 Hold for add-ons.   Final    Auto resulted.   • Extra Tube 04/05/2022 hold for add-on   Final    Auto resulted   • Extra Tube 04/05/2022 hold for add-on   Final    Auto resulted   • WBC 04/05/2022 7.51  3.40 - 10.80 10*3/mm3 Final   • RBC 04/05/2022 3.29 (A) 4.14 - 5.80 10*6/mm3 Final   • Hemoglobin 04/05/2022 8.6 (A) 13.0 - 17.7 g/dL Final   • Hematocrit 04/05/2022 27.2 (A) 37.5 - 51.0 % Final   • MCV 04/05/2022 82.7  79.0 - 97.0 fL Final   • MCH 04/05/2022 26.1 (A) 26.6 - 33.0 pg Final   • MCHC 04/05/2022 31.6  31.5 - 35.7 g/dL Final   • RDW 04/05/2022 17.0 (A)  12.3 - 15.4 % Final   • RDW-SD 04/05/2022 51.1  37.0 - 54.0 fl Final   • MPV 04/05/2022 12.4 (A) 6.0 - 12.0 fL Final   • Platelets 04/05/2022 242  140 - 450 10*3/mm3 Final   • Neutrophil % 04/05/2022 83.1 (A) 42.7 - 76.0 % Final   • Lymphocyte % 04/05/2022 9.5 (A) 19.6 - 45.3 % Final   • Monocyte % 04/05/2022 6.0  5.0 - 12.0 % Final   • Eosinophil % 04/05/2022 0.7  0.3 - 6.2 % Final   • Basophil % 04/05/2022 0.3  0.0 - 1.5 % Final   • Immature Grans % 04/05/2022 0.4  0.0 - 0.5 % Final   • Neutrophils, Absolute 04/05/2022 6.25  1.70 - 7.00 10*3/mm3 Final   • Lymphocytes, Absolute 04/05/2022 0.71  0.70 - 3.10 10*3/mm3 Final   • Monocytes, Absolute 04/05/2022 0.45  0.10 - 0.90 10*3/mm3 Final   • Eosinophils, Absolute 04/05/2022 0.05  0.00 - 0.40 10*3/mm3 Final   • Basophils, Absolute 04/05/2022 0.02  0.00 - 0.20 10*3/mm3 Final   • Immature Grans, Absolute 04/05/2022 0.03  0.00 - 0.05 10*3/mm3 Final   • nRBC 04/05/2022 0.0  0.0 - 0.2 /100 WBC Final   • Protime 04/05/2022 15.1 (A) 11.7 - 14.2 Seconds Final   • INR 04/05/2022 1.20 (A) 0.90 - 1.10 Final   • Troponin T 04/06/2022 0.030  0.000 - 0.030 ng/mL Final   • QT Interval 04/05/2022 391  ms Preliminary   • COVID19 04/06/2022 Not Detected  Not Detected - Ref. Range Final   Anticoagulation Visit on 04/05/2022   Component Date Value Ref Range Status   • INR 04/05/2022 1.30   Final     Lab Results (last 24 hours)     Procedure Component Value Units Date/Time    COVID PRE-OP / PRE-PROCEDURE SCREENING ORDER (NO ISOLATION) - Swab, Nasopharynx [[id number]]  (Normal) Collected: 04/06/22 0421    Specimen: Swab from Nasopharynx Updated: 04/06/22 0507    Narrative:      The following orders were created for panel order COVID PRE-OP / PRE-PROCEDURE SCREENING ORDER (NO ISOLATION) - Swab, Nasopharynx.  Procedure                               Abnormality         Status                     ---------                               -----------         ------                      COVID-19,[facility]-HOUSE...[[id number]]  Normal              Final result                 Please view results for these tests on the individual orders.    COVID-19,[facility]HOU[facility] CEPHEID/PORFIRIO NP SWAB IN TRANSPORT MEDIA 8-12 HR TAT - Swab, Nasopharynx [[id number]]  (Normal) Collected: 04/06/22 0421    Specimen: Swab from Nasopharynx Updated: 04/06/22 0506     COVID19 Not Detected    Narrative:      Fact sheet for providers: https://www.fda.gov/media/150076/download     Fact sheet for patients: https://www.fda.gov/media/742282/download    [place] Draw [[id number]] Collected: 04/05/22 2304    Specimen: Blood Updated: 04/06/22 0117    Narrative:      The following orders were created for panel order [place] Draw.  Procedure                               Abnormality         Status                     ---------                               -----------         ------                     Green Top (Gel)[[id number]]                                  Final result               Lavender Top[[id number]]                                     Final result               Gold Top - SST[[id number]]                                                              Light Blue Top[[id number]]                                   Final result                 Please view results for these tests on the individual orders.    Green Top (Gel) [[id number]] Collected: 04/06/22 0002    Specimen: Blood Updated: 04/06/22 0117     Extra Tube Hold for add-ons.     Comment: Auto resulted.       Comprehensive Metabolic Panel [[id number]]  (Abnormal) Collected: 04/06/22 0002    Specimen: Blood Updated: 04/06/22 0047     Glucose 184 mg/dL      BUN 11 mg/dL      Creatinine 1.09 mg/dL      Sodium 138 mmol/L      Potassium 4.1 mmol/L      Chloride 100 mmol/L      CO2 29.6 mmol/L      Calcium 8.5 mg/dL      Total Protein 6.3 g/dL      Albumin 2.70 g/dL      ALT (SGPT) 11 U/L      AST (SGOT) 14 U/L      Alkaline Phosphatase 131 U/L      Total Bilirubin 0.6 mg/dL       Globulin 3.6 gm/dL      A/G Ratio 0.8 g/dL      BUN/Creatinine Ratio 10.1     Anion Gap 8.4 mmol/L      eGFR 66.9 mL/min/1.73      Comment: National Kidney Foundation and American Society of Nephrology (ASN) Task Force recommended calculation based on the Chronic Kidney Disease Epidemiology Collaboration (CKD-EPI) equation refit without adjustment for race.       Narrative:      GFR Normal >60  Chronic Kidney Disease <60  Kidney Failure <15      Troponin [[id number]]  (Normal) Collected: 04/06/22 0002    Specimen: Blood Updated: 04/06/22 0035     Troponin T 0.030 ng/mL     Narrative:      Troponin T Reference Range:  <= 0.03 ng/mL-   Negative for AMI  >0.03 ng/mL-     Abnormal for myocardial necrosis.  Clinicians would have to utilize clinical acumen, EKG, Troponin and serial changes to determine if it is an Acute Myocardial Infarction or myocardial injury due to an underlying chronic condition.       Results may be falsely decreased if patient taking Biotin.      Light Blue Top [[id number]] Collected: 04/05/22 2304    Specimen: Blood Updated: 04/06/22 0017     Extra Tube hold for add-on     Comment: Auto resulted       Lavender Top [[id number]] Collected: 04/05/22 2304    Specimen: Blood Updated: 04/06/22 0017     Extra Tube hold for add-on     Comment: Auto resulted       CBC & Differential [[id number]]  (Abnormal) Collected: 04/05/22 2304    Specimen: Blood Updated: 04/05/22 2336    Narrative:      The following orders were created for panel order CBC & Differential.  Procedure                               Abnormality         Status                     ---------                               -----------         ------                     CBC Auto Differential[[id number]]        Abnormal            Final result                 Please view results for these tests on the individual orders.    CBC Auto Differential [[id number]]  (Abnormal) Collected: 04/05/22 2304    Specimen: Blood Updated: 04/05/22 2336     WBC 7.51  10*3/mm3      RBC 3.29 10*6/mm3      Hemoglobin 8.6 g/dL      Hematocrit 27.2 %      MCV 82.7 fL      MCH 26.1 pg      MCHC 31.6 g/dL      RDW 17.0 %      RDW-SD 51.1 fl      MPV 12.4 fL      Platelets 242 10*3/mm3      Neutrophil % 83.1 %      Lymphocyte % 9.5 %      Monocyte % 6.0 %      Eosinophil % 0.7 %      Basophil % 0.3 %      Immature Grans % 0.4 %      Neutrophils, Absolute 6.25 10*3/mm3      Lymphocytes, Absolute 0.71 10*3/mm3      Monocytes, Absolute 0.45 10*3/mm3      Eosinophils, Absolute 0.05 10*3/mm3      Basophils, Absolute 0.02 10*3/mm3      Immature Grans, Absolute 0.03 10*3/mm3      nRBC 0.0 /100 WBC     Protime-INR [[id number]]  (Abnormal) Collected: 04/05/22 2304    Specimen: Blood Updated: 04/05/22 2325     Protime 15.1 Seconds      INR 1.20          Imaging:   XR KNEE 1 OR 2 VW LEFT-     Clinical: Left knee pain     FINDINGS: Medial and lateral compartments preserved. There is patellofemoral joint narrowing. Vascular arterial calcifications within the soft tissues. No bone lesion, osteochondral defect, fracture or dislocation seen.     CONCLUSION: Patellofemoral joint degeneration. No acute osseous or  articular abnormality.     This report was finalized on 4/5/2022 8:54 PM by Dr. Adrian Brand M.D    CT LEFT LOWER EXTREMITY  IMPRESSION:     1. No acute osseous findings.  2. Both the patellar tendon and quadriceps tendon appear to be intact. If concern persists for soft tissue injury, consideration for MRI is suggested.  3. Diffuse left lower extremity edema.    This report was finalized on 4/6/2022 2:27 AM by Dr. Yue Frye M.D.    CT LUMBAR SPINE  IMPRESSION:     1. No acute osseous findings.  2. Degenerative changes in the spine, as noted above.  3. Asymmetric enlargement of the patient's left iliacus and left iliopsoas muscle, incompletely assessed on this exam. Given history of anticoagulation, this may represent hemorrhage. There are areas of more central low attenuation, which  could reflect hematoma liquefaction, but correlation with any evidence of infection is recommended. The hemorrhage does abut the patient's left psoas muscle as well, and there is also some stranding and fluid seen within the left perivesical space.      This report was finalized on 4/6/2022 2:43 AM by Dr. Yue Frye M.D.    Duplex Venous Lower Left Extremity  Study Impression:    •     Right Common Femoral:  No deep vein thrombosis noted.   •     Left Common Femoral: No deep vein thrombosis noted.   •     Left Saphenofemoral Junction: No superficial thrombophlebitis noted.   •     Left Proximal Femoral: No deep vein thrombosis noted.   •     Left Mid Femoral: No deep vein thrombosis noted.   •     Left Distal Femoral: No deep vein thrombosis noted.   •     Left Popliteal: No deep vein thrombosis noted.   •     Left Posterior Tibial: No deep vein thrombosis noted.    •     Left Peroneal: No deep vein thrombosis noted.   •     Left Gastrocnemius: No deep vein thrombosis noted.    •     Left Great Saphenous Above Knee: No superficial thrombophlebitis noted.   •     Left Great Saphenous Below Knee: No superficial thrombophlebitis noted.       Assessment:  1.  Left lower leg pain   2.  Left iliacus hematoma    Plan:  May continue Tylenol for pain control measures.  Recommend ice to affected area.  May use SCD on right lower leg.  Recommend holding anticoagulation, but I will defer this decision to cardiology.  I will sign off for now, but am happy to follow up as needed.      Date: 4/6/2022    LIZA Goodwin    CC: Bowen Coughlin MD

## 2022-04-06 NOTE — HOME HEALTH
Hospital Course:    Patient is a 87 yo male who lives in his home with his wife. He went to hosp  after wife came home and he had supper and had emesis afterwards, and continued to have emesis the next day so wife took him to hospital. He was found to have infected galbladder, His WBC was so elevated he was started on vanc and also found to have Aspiration pneumonia was treated via pulmonology. Coumadin was put on hold . Once cleared for surgery he had laparoscopic cholecytectomy He has hx of pulmonary HTN, and recurrent pleural effusions. Postop there were complications and had to have  thoracentesis with 1700 cc removed. coumadin and lovenox resumed,  Wife has machine to check PT/INR herself and she did so today and he was 1.3 , [facility] called her and they will give him 10 mg coumadin Tonight and tomorrow night and he is still on lovenox 70mg  BID  and she will recheck on Thursday.  Per wife speech evaluated him in hosp and could not determine what caused the aspitiation pneumonia.

## 2022-04-07 ENCOUNTER — TELEPHONE (OUTPATIENT)
Dept: FAMILY MEDICINE CLINIC | Facility: CLINIC | Age: 87
End: 2022-04-07

## 2022-04-07 LAB
ANION GAP SERPL CALCULATED.3IONS-SCNC: 8.8 MMOL/L (ref 5–15)
BUN SERPL-MCNC: 13 MG/DL (ref 8–23)
BUN/CREAT SERPL: 10.9 (ref 7–25)
CALCIUM SPEC-SCNC: 8.5 MG/DL (ref 8.6–10.5)
CHLORIDE SERPL-SCNC: 100 MMOL/L (ref 98–107)
CO2 SERPL-SCNC: 29.2 MMOL/L (ref 22–29)
CREAT SERPL-MCNC: 1.19 MG/DL (ref 0.76–1.27)
DEPRECATED RDW RBC AUTO: 52.4 FL (ref 37–54)
EGFRCR SERPLBLD CKD-EPI 2021: 60.2 ML/MIN/1.73
ERYTHROCYTE [DISTWIDTH] IN BLOOD BY AUTOMATED COUNT: 17 % (ref 12.3–15.4)
GLUCOSE BLDC GLUCOMTR-MCNC: 141 MG/DL (ref 70–130)
GLUCOSE BLDC GLUCOMTR-MCNC: 159 MG/DL (ref 70–130)
GLUCOSE BLDC GLUCOMTR-MCNC: 197 MG/DL (ref 70–130)
GLUCOSE SERPL-MCNC: 206 MG/DL (ref 65–99)
HCT VFR BLD AUTO: 23.7 % (ref 37.5–51)
HCT VFR BLD AUTO: 26.4 % (ref 37.5–51)
HCT VFR BLD AUTO: 26.5 % (ref 37.5–51)
HGB BLD-MCNC: 7.2 G/DL (ref 13–17.7)
HGB BLD-MCNC: 8.1 G/DL (ref 13–17.7)
HGB BLD-MCNC: 8.2 G/DL (ref 13–17.7)
INR PPP: 1.28 (ref 0.9–1.1)
MAGNESIUM SERPL-MCNC: 2 MG/DL (ref 1.6–2.4)
MCH RBC QN AUTO: 25.8 PG (ref 26.6–33)
MCHC RBC AUTO-ENTMCNC: 30.6 G/DL (ref 31.5–35.7)
MCV RBC AUTO: 84.4 FL (ref 79–97)
PHOSPHATE SERPL-MCNC: 2.7 MG/DL (ref 2.5–4.5)
PLATELET # BLD AUTO: 270 10*3/MM3 (ref 140–450)
PMV BLD AUTO: 12.2 FL (ref 6–12)
POTASSIUM SERPL-SCNC: 4.4 MMOL/L (ref 3.5–5.2)
PROTHROMBIN TIME: 15.9 SECONDS (ref 11.7–14.2)
RBC # BLD AUTO: 3.14 10*6/MM3 (ref 4.14–5.8)
SODIUM SERPL-SCNC: 138 MMOL/L (ref 136–145)
WBC NRBC COR # BLD: 10.81 10*3/MM3 (ref 3.4–10.8)

## 2022-04-07 PROCEDURE — 97162 PT EVAL MOD COMPLEX 30 MIN: CPT

## 2022-04-07 PROCEDURE — 85027 COMPLETE CBC AUTOMATED: CPT | Performed by: STUDENT IN AN ORGANIZED HEALTH CARE EDUCATION/TRAINING PROGRAM

## 2022-04-07 PROCEDURE — 82962 GLUCOSE BLOOD TEST: CPT

## 2022-04-07 PROCEDURE — 97110 THERAPEUTIC EXERCISES: CPT

## 2022-04-07 PROCEDURE — 84100 ASSAY OF PHOSPHORUS: CPT | Performed by: STUDENT IN AN ORGANIZED HEALTH CARE EDUCATION/TRAINING PROGRAM

## 2022-04-07 PROCEDURE — 83735 ASSAY OF MAGNESIUM: CPT | Performed by: STUDENT IN AN ORGANIZED HEALTH CARE EDUCATION/TRAINING PROGRAM

## 2022-04-07 PROCEDURE — 63710000001 INSULIN LISPRO (HUMAN) PER 5 UNITS: Performed by: NURSE PRACTITIONER

## 2022-04-07 PROCEDURE — 85610 PROTHROMBIN TIME: CPT | Performed by: STUDENT IN AN ORGANIZED HEALTH CARE EDUCATION/TRAINING PROGRAM

## 2022-04-07 PROCEDURE — 85014 HEMATOCRIT: CPT | Performed by: NURSE PRACTITIONER

## 2022-04-07 PROCEDURE — 80048 BASIC METABOLIC PNL TOTAL CA: CPT | Performed by: STUDENT IN AN ORGANIZED HEALTH CARE EDUCATION/TRAINING PROGRAM

## 2022-04-07 PROCEDURE — 99232 SBSQ HOSP IP/OBS MODERATE 35: CPT | Performed by: INTERNAL MEDICINE

## 2022-04-07 PROCEDURE — 85018 HEMOGLOBIN: CPT | Performed by: NURSE PRACTITIONER

## 2022-04-07 RX ADMIN — ACETAMINOPHEN 650 MG: 325 TABLET ORAL at 13:33

## 2022-04-07 RX ADMIN — INSULIN LISPRO 2 UNITS: 100 INJECTION, SOLUTION INTRAVENOUS; SUBCUTANEOUS at 12:36

## 2022-04-07 RX ADMIN — Medication 1 CAPSULE: at 08:58

## 2022-04-07 RX ADMIN — METOPROLOL SUCCINATE 12.5 MG: 25 TABLET, EXTENDED RELEASE ORAL at 08:58

## 2022-04-07 RX ADMIN — Medication 10 ML: at 20:20

## 2022-04-07 RX ADMIN — FAMOTIDINE 20 MG: 20 TABLET ORAL at 18:00

## 2022-04-07 RX ADMIN — MAGNESIUM OXIDE 400 MG (241.3 MG MAGNESIUM) TABLET 400 MG: TABLET at 08:58

## 2022-04-07 RX ADMIN — Medication 10 ML: at 09:02

## 2022-04-07 RX ADMIN — MAGNESIUM OXIDE 400 MG (241.3 MG MAGNESIUM) TABLET 400 MG: TABLET at 20:20

## 2022-04-07 RX ADMIN — FUROSEMIDE 40 MG: 20 TABLET ORAL at 08:58

## 2022-04-07 RX ADMIN — FAMOTIDINE 20 MG: 20 TABLET ORAL at 08:58

## 2022-04-07 RX ADMIN — ATORVASTATIN CALCIUM 40 MG: 20 TABLET, FILM COATED ORAL at 08:58

## 2022-04-07 RX ADMIN — DIGOXIN 62.5 MCG: 125 TABLET ORAL at 12:37

## 2022-04-07 NOTE — TELEPHONE ENCOUNTER
Caller: IR[facility]    Relationship: [facility]    Best call back number: [phone]    What orders are you requesting (i.e. lab or imaging): VERBAL ORDERS FOR RESUMPTION OF CARE

## 2022-04-07 NOTE — PLAN OF CARE
Goal Outcome Evaluation:           VSS. Alert and oriented. Tylenol x 1. Sat on side of the bed with PT.

## 2022-04-07 NOTE — PROGRESS NOTES
Name: Francisco Sequeira ADMIT: 2022   : 1935  PCP: Rubin Hinkle APRN    MRN: [id number] LOS: 1 days   AGE/SEX: 86 y.o. male  ROOM: 48/48     Subjective   Subjective   Patient seen this morning.  Continues to complain of lower extremity pain, pain with movement.  Denies chest pain shortness of breath, fevers, chills.  Hemoglobin stable.    Review of Systems   As above  Objective   Objective   Vital Signs  Temp:  [98 °F (36.7 °C)-98.4 °F (36.9 °C)] 98.3 °F (36.8 °C)  Heart Rate:  [82-92] 92  Resp:  [14-16] 16  BP: (104-143)/(44-69) 104/44  SpO2:  [96 %-100 %] 100 %  on   ;   Device (Oxygen Therapy): room air  Body mass index is 21.16 kg/m².  Physical Exam  Mr. Sequeira is a 86 y.o. male former smoker with a history of valvular heart disease, afib, chronic AC, CKD, DM, HTN, NICM, and multiple medical issues who is admitted for lt iliopsoas muscle hematoma on AC      GeneralAlert and oriented x3, no acute distress, chronically ill-appearing.  Eyes PERRL, EOMI, anicteric sclera  LungsClear to auscultation bilaterally, no crackles or wheezes  CV Irregular rate and rhythm, + systolic murmurs   Abdomen Soft, nontender, nondistended.  Normoactive bowel sounds  Extremities Left lower extremity pain limited range of motion secondary to pain.  Able to wiggle his extremity toes.  Intact sensation.  Normal strength right lower extremity.  1+ edema bilaterally.    Psych  : Appropriate mood and affect   Results Review     I reviewed the patient's new clinical results.  Results from last 7 days   Lab Units 22  2347 22  1653 22  0951 22  2304 22  0711 22  0617   WBC 10*3/mm3  --   --  7.40 7.51 6.36 5.59   HEMOGLOBIN g/dL 8.2* 7.4* 7.8*  7.8* 8.6* 9.1* 9.1*   PLATELETS 10*3/mm3  --   --  213 242 201 184     Results from last 7 days   Lab Units 22  0951 22  0002 22  0711 22  0617   SODIUM mmol/L 138 138 139 139   POTASSIUM mmol/L 4.2 4.1 4.3 4.3   CHLORIDE  mmol/L 101 100 107 108*   CO2 mmol/L 32.0* 29.6* 26.7 26.0   BUN mg/dL 11 11 10 12   CREATININE mg/dL 1.14 1.09 0.96 0.99   GLUCOSE mg/dL 161* 184* 143* 147*   Estimated Creatinine Clearance: 48.3 mL/min (by C-G formula based on SCr of 1.14 mg/dL).  Results from last 7 days   Lab Units 04/06/22  0002 04/03/22  0617 04/02/22  0456 04/01/22  0502   ALBUMIN g/dL 2.70* 2.20* 2.40* 1.90*   BILIRUBIN mg/dL 0.6 0.3 0.3 0.2   ALK PHOS U/L 131* 160* 158* 151*   AST (SGOT) U/L 14 18 21 30   ALT (SGPT) U/L 11 13 11 12     Results from last 7 days   Lab Units 04/06/22  0951 04/06/22  0002 04/04/22  0711 04/03/22  0617 04/02/22  0456 04/01/22  0502   CALCIUM mg/dL 8.1* 8.5* 8.2* 7.7* 7.5* 7.4*   ALBUMIN g/dL  --  2.70*  --  2.20* 2.40* 1.90*       COVID19   Date Value Ref Range Status   04/06/2022 Not Detected Not Detected - Ref. Range Final   03/28/2022 Not Detected Not Detected - Ref. Range Final     Glucose   Date/Time Value Ref Range Status   04/07/2022 1053 197 (H) 70 - 130 mg/dL Final     Comment:     Meter: [id number] : [id number] King Sarahn NA   04/07/2022 0558 159 (H) 70 - 130 mg/dL Final     Comment:     Meter: [id number] : [id number] Stewart Magalis NA   04/06/2022 2101 225 (H) 70 - 130 mg/dL Final     Comment:     Meter: [id number] : [id number] Stewart Magalis NA   04/06/2022 1645 213 (H) 70 - 130 mg/dL Final     Comment:     Meter: [id number] : [id number] Jm Martinez NA   04/06/2022 1212 148 (H) 70 - 130 mg/dL Final     Comment:     Meter: [id number] : [id number] Jm ALEJO   04/06/2022 0944 162 (H) 70 - 130 mg/dL Final     Comment:     Meter: [id number] : [id number] Jm ALEJO       Duplex Venous Lower Extremity LEFT  · Normal left lower extremity venous duplex scan.     CT Lumbar Spine Without Contrast  Narrative: CT OF THE LUMBAR SPINE     HISTORY: Left lower extremity weakness     COMPARISON: 03/23/2022     TECHNIQUE: Axial CT imaging was obtained through the lumbar  spine.  Coronal and sagittal reformatted images were obtained.     FINDINGS:  No acute fracture or subluxation of the lumbar spine is identified.  Intervertebral disc spaces appear relatively well-maintained. Lumbar  vertebral body alignment appears within normal limits.     T12-L1: There is no canal stenosis or neural foraminal narrowing.  L1-L2: There is broad-based disc bulge. This results in narrowing of the  canal, as well as bilateral neural foraminal narrowing, right greater  than left.  L2-L3: There is broad-based disc bulge. This results in canal narrowing,  as well as neural foraminal narrowing, right greater than left.  L3-L4: Disc bulge results in canal stenosis. There is bilateral neural  foraminal narrowing.  L4-L5: There is broad-based disc bulge. There is canal stenosis. There  is bilateral neural foraminal narrowing, left greater than right.  L5-S1: There is no canal stenosis or neural foraminal narrowing.        This patient has stranding which is seen posterior to the left psoas  muscle, which is new when compared to prior exam. There is also  asymmetric enlargement of the patient's left iliacus muscle, with  displacement of the left psoas muscle anteriorly. Appearance is  concerning for hematoma, given history of anticoagulation, although this  is incompletely assessed on this exam. Centrally, there is more focal  area of decreased attenuation. This could reflect some liquefying  hematoma, but correlation with any evidence of infection is recommended.  There is additional fluid and stranding which is seen within the left  perivesical space. There is some free fluid within the pelvis within the  right hemipelvis. The patient is noted to have a partially visualized  moderate right pleural effusion. There also is some trace pleural fluid  on the left.     Impression:    1. No acute osseous findings.  2. Degenerative changes in the spine, as noted above.  3. Asymmetric enlargement of the patient's  left iliacus and left  iliopsoas muscle, incompletely assessed on this exam. Given history of  anticoagulation, this may represent hemorrhage. There are areas of more  central low attenuation, which could reflect hematoma liquefaction, but  correlation with any evidence of infection is recommended. The  hemorrhage does abut the patient's left psoas muscle as well, and there  is also some stranding and fluid seen within the left perivesical space.     FINDINGS were discussed with Dr. Dubose at 2:40 AM.     Radiation dose reduction techniques were utilized, including automated  exposure control and exposure modulation based on body size.     This report was finalized on 4/6/2022 2:43 AM by Dr. Yue Frye M.D.     CT Lower Extremity Left Without Contrast  Narrative: CT OF THE LEFT LOWER EXTREMITY     HISTORY: Left leg weakness     COMPARISON: 04/05/2022     TECHNIQUE: Axial CT imaging was obtained through the left lower  extremity. Coronal and sagittal reformatted images were obtained.     FINDINGS:  No acute fracture or subluxation of the left knee is seen. There is  diffuse left lower extremity edema. There is a trace amount of patellar  fluid. There is really no significant stranding within the infrapatellar  bursa. Both the patellar and quadriceps tendon appear continuous. Dense  vascular calcifications are noted. The edema appears to be more  significant on the lateral aspect of the leg.     Impression:    1. No acute osseous findings.  2. Both the patellar tendon and quadriceps tendon appear to be intact.  If concern persists for soft tissue injury, consideration for MRI is  suggested.  3. Diffuse left lower extremity edema.     Radiation dose reduction techniques were utilized, including automated  exposure control and exposure modulation based on body size.     This report was finalized on 4/6/2022 2:27 AM by Dr. Yue Frye M.D.     CT Head Without Contrast  Narrative: CT HEAD WITHOUT CONTRAST      HISTORY: Left knee weakness     COMPARISON: 10/23/2021     TECHNIQUE: Axial CT imaging was obtained through the brain. No IV  contrast was administered.     FINDINGS:  Images through the posterior fossa are degraded by streak artifact from  dental amalgam. No acute intracranial hemorrhage is seen. There is  diffuse atrophy. There is periventricular and deep white matter  microangiopathic change. No calvarial fracture is seen. Paranasal  sinuses and mastoid air cells appear clear.     Impression: No acute intracranial findings.     Radiation dose reduction techniques were utilized, including automated  exposure control and exposure modulation based on body size.     This report was finalized on 4/6/2022 2:18 AM by Dr. Yue Frye M.D.       Scheduled Medications  atorvastatin, 40 mg, Oral, Daily  digoxin, 62.5 mcg, Oral, Daily  famotidine, 20 mg, Oral, BID AC  ferrous sulfate, 325 mg, Oral, Every Other Day  furosemide, 40 mg, Oral, Daily  insulin lispro, 0-9 Units, Subcutaneous, TID AC  lactobacillus acidophilus, 1 capsule, Oral, Daily  magnesium oxide, 400 mg, Oral, BID  metoprolol succinate XL, 12.5 mg, Oral, Daily  sodium chloride, 10 mL, Intravenous, Q12H  vancomycin, 125 mg, Oral, Every Other Day    Infusions   Diet  Diet Regular; Cardiac, Consistent Carbohydrate       Assessment/Plan     Active Hospital Problems    Diagnosis  POA   • **Hematoma of iliopsoas muscle, left, initial encounter [S70.12XA]  Yes   • History of CVA (cerebrovascular accident) [Z86.73]  Not Applicable   • S/P laparoscopic cholecystectomy [Z90.49]  Not Applicable   • Anemia [D64.9]  Yes   • Chronic anticoagulation [Z79.01]  Not Applicable   • Stage 3b chronic kidney disease (HCC) [N18.32]  Yes   • H/O heart valve replacement with mechanical valve [Z95.2]  Not Applicable   • Type 2 diabetes mellitus (HCC) [E11.9]  Yes   • Hypertension [I10]  Yes   • Atrial fibrillation (HCC) [I48.91]  Yes      Resolved Hospital Problems   No  resolved problems to display.       Mr. Sequeira is a 86 y.o. male former smoker with a history of valvular heart disease, afib, chronic AC, CKD, DM, HTN, NICM, and multiple medical issues who is admitted for lt iliopsoas muscle hematoma on AC     Left Iliopsoas hematoma   -CT showed Asymmetric enlargement of the patient's left iliacus and left   iliopsoas muscle, here are areas of more central low attenuation, could reflect hematoma liquefaction,   -Patientwas recently discharged from the hospital after cholecystectomy, aspiration pneumonia.  Was discharged home on warfarin and Lovenox for bridge.   Hemoglobin was 8.6 on admission,   --hold anticoagulation , monitor H and H    -Discontinue aspirin as well as hemoglobin trending down   -Orthopedic surgery consulted-no interventions indicated.  -Hemoglobin is down today.  -Cardiology consulted. Recommend restarting anticoagulation once stable from medical standpoint.  Signed off.  -Hemoglobin slightly down today but stable.  Last one was 8.2.  Continue to monitor.  -Continue to monitor hemoglobin.  If hemoglobin stable may resume warfarin tomorrow      Prior history of CVA therapeutic INR of 2.2.  Patient is on warfarin and aspirin secondary to prior history of CVA: However due to recent GI bleed and now hematoma , patient remains very high risk for recurrent bleeding.  I think risk of continued combination of warfarin and aspirin outweigh the benefits.  Discussed with family we would likely just patient on warfarin only to decrease the risk of recurrent bleeding.    History of mechanical aortic valve/atrial fibrillation:   Continue metoprolol, digoxin.    Hold anticoagulation and aspirin secondary to hematoma as above and hemoglobin trended down.  Cardiology consulted -signed off    DM type II: Continue sliding scale insulin.  Hypoglycemic protocol.      Recent C. difficile colitis:  Continue vancomycin taper.     Bowen Coughlin MD  [facility]  04/07/22  12:42 EDT

## 2022-04-07 NOTE — PLAN OF CARE
Goal Outcome Evaluation:      Pt appeared to rest well this shift, pt had no new issues this shift, pt continues to complain of pain with movement

## 2022-04-07 NOTE — THERAPY EVALUATION
Patient Name: Francisco Sequeira  : 1935    MRN: [id number]                              Today's Date: 2022       Admit Date: 2022    Visit Dx:     ICD-10-CM ICD-9-CM   1. Hematoma of iliopsoas muscle, left, initial encounter  S70.12XA 924.00   2. Chronic anticoagulation  Z79.01 V58.61   3. H/O heart valve replacement with mechanical valve  Z95.2 V43.3   4. Immobility syndrome  M62.3 728.3   5. Anemia, unspecified type  D64.9 285.9   6. 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)   • Iron deficiency anemia   • Chronic anticoagulation   • Hemiparesis of left nondominant side as late effect of cerebral infarction (HCC)   • Rectus sheath hematoma   • Sepsis (HCC)   • Calculus of gallbladder with acute cholecystitis without obstruction   • History of Clostridium difficile infection   • Aspiration pneumonitis (HCC)   • Hematoma of iliopsoas muscle, left, initial encounter   • History of CVA (cerebrovascular accident)   • S/P laparoscopic cholecystectomy   • Anemia     Past Medical History:   Diagnosis Date   • Allergic rhinitis    • Aortic valve insufficiency    • Ascending aortic aneurysm (HCC)    • Atrial fibrillation (HCC)    • Bacteremia    • Calcific aortic stenosis of bicuspid valve    • Cardiac arrest (HCC)    • Cardiomyopathy (HCC)    • CKD (chronic kidney disease) stage 3, GFR 30-59 ml/min (HCC)    • Contact dermatitis due to poison ivy    • Elbow fracture    • Esophageal reflux    • GERD (gastroesophageal reflux disease)    • Head  injury    • History of transfusion    • Hyperglycemia    • Hyperlipidemia    • Hypertension    • Kidney carcinoma (HCC)    • Nonischemic cardiomyopathy (HCC)    • Renal oncocytoma    • Seasonal allergic reaction    • Sinusitis    • Syncope    • Type 2 diabetes mellitus (HCC)     uncontrolled   • Visual field defect      Past Surgical History:   Procedure Laterality Date   • AORTIC VALVE REPAIR/REPLACEMENT     • ASCENDING AORTIC ANEURYSM REPAIR W/ MECHANICAL AORTIC VALVE REPLACEMENT     • CHOLECYSTECTOMY WITH INTRAOPERATIVE CHOLANGIOGRAM N/A 3/29/2022    Procedure: Laparoscopic cholecystectomy with cholangiogram, possible open;  Surgeon: Lisa Guidry MD;  Location: [facility] MAIN OR;  Service: General;  Laterality: N/A;   • COLONOSCOPY N/A 10/28/2021    Procedure: COLONOSCOPY to cecum:  cold snare polyps,;  Surgeon: Dinesh Meza MD;  Location: [facility] ENDOSCOPY;  Service: Gastroenterology;  Laterality: N/A;  pre:  Iron deficiency anemia  post:  polyps, diverticulosis,    • COLONOSCOPY N/A 11/9/2021    Procedure: COLONOSCOPY to cecum with APC cautery of AVM and clip placement x1;  Surgeon: Pavan Rodriguez MD;  Location: [facility] ENDOSCOPY;  Service: Gastroenterology;  Laterality: N/A;  PRE - gi bleed, anemia  POST - diverticulosis, poor prep, AVM right colon   • ENDOSCOPY N/A 10/28/2021    Procedure: ESOPHAGOGASTRODUODENOSCOPY with biopsies;  Surgeon: Dinesh Meza MD;  Location: [facility] ENDOSCOPY;  Service: Gastroenterology;  Laterality: N/A;  pre:  Iron deficiency anemia  post:  duodenitis and gastritis   • EYE SURGERY  12/09/2020    cataract surgery    • NEPHRECTOMY     • OTHER SURGICAL HISTORY      elbow surgery   • OTHER SURGICAL HISTORY      right arm surgery   • PROSTATE SURGERY     • THORACENTESIS Left     diagnostic      General Information     Row Name 04/07/22 1001          Physical Therapy Time and Intention    Document Type evaluation  -MS     Mode of Treatment physical therapy  -MS      Row Name 04/07/22 1530          General Information    Patient Profile Reviewed yes  -MS     Prior Level of Function independent:;all household mobility  multiple hospital admissions, ambulating with RW, current with HH  -MS     Existing Precautions/Restrictions fall  -MS     Barriers to Rehab impaired sensation  -MS     Row Name 04/07/22 1530          Living Environment    People in Home spouse  -MS     Row Name 04/07/22 1530          Cognition    Orientation Status (Cognition) oriented x 3  -MS     Row Name 04/07/22 1530          Safety Issues, Functional Mobility    Safety Issues Affecting Function (Mobility) insight into deficits/self-awareness;judgment;positioning of assistive device;sequencing abilities  -MS     Impairments Affecting Function (Mobility) balance;postural/trunk control;strength;sensation/sensory awareness;range of motion (ROM);pain  -MS     Comment, Safety Issues/Impairments (Mobility) Non skid socks donned.  -MS           User Key  (r) = Recorded By, (t) = Taken By, (c) = Cosigned By    Initials Name Provider Type    MS Goldie Abrams, PT Physical Therapist               Mobility     Row Name 04/07/22 1531          Bed Mobility    Bed Mobility supine-sit;sit-supine;scooting/bridging  -MS     Scooting/Bridging [place] (Bed Mobility) dependent (less than 25% patient effort);2 person assist;verbal cues;nonverbal cues (demo/gesture)  -MS     Supine-Sit [place] (Bed Mobility) maximum assist (25% patient effort);verbal cues;nonverbal cues (demo/gesture)  -MS     Sit-Supine [place] (Bed Mobility) maximum assist (25% patient effort);verbal cues;nonverbal cues (demo/gesture)  -MS     Assistive Device (Bed Mobility) bed rails;head of bed elevated  -MS     Comment, (Bed Mobility) L lateral lean, fatigued quickly, up to mod-maxA at times for sitting balance, pain and weakness limiting.  -MS     Row Name 04/07/22 1531          Transfers    Comment, (Transfers) Not appropriate to assess  currently.  -MS           User Key  (r) = Recorded By, (t) = Taken By, (c) = Cosigned By    Initials Name Provider Type    MS Goldie Abrams, PT Physical Therapist               Obj/Interventions     Row Name 04/07/22 1532          Range of Motion Comprehensive    General Range of Motion no range of motion deficits identified  -MS     Row Name 04/07/22 1532          Strength Comprehensive (MMT)    Comment, General Manual Muscle Testing (MMT) Assessment R LE 4/5, L hip flexion 1/5, L knee flex 2/5, L ankle 4/5  -MS     Row Name 04/07/22 1532          Balance    Balance Assessment sitting static balance;sitting dynamic balance  -MS     Static Sitting Balance moderate assist;maximum assist;verbal cues;non-verbal cues (demo/gesture)  -MS     Dynamic Sitting Balance moderate assist;maximum assist;verbal cues;non-verbal cues (demo/gesture)  -MS     Position, Sitting Balance sitting edge of bed  -MS     Balance Interventions sitting;supported;static;dynamic;weight shifting activity  -MS     Row Name 04/07/22 1532          Sensory Assessment (Somatosensory)    Sensory Assessment (Somatosensory) left LE  -MS     Left LE Sensory Assessment impaired;absent  specifically L anterior thigh  -MS           User Key  (r) = Recorded By, (t) = Taken By, (c) = Cosigned By    Initials Name Provider Type    MS AbramsGoldie, PT Physical Therapist               Goals/Plan     Row Name 04/07/22 1534          Bed Mobility Goal 1 (PT)    Activity/Assistive Device (Bed Mobility Goal 1, PT) bed mobility activities, all  -MS     [place] Level/Cues Needed (Bed Mobility Goal 1, PT) supervision required  -MS     Time Frame (Bed Mobility Goal 1, PT) 1 week  -MS     Row Name 04/07/22 1534          Transfer Goal 1 (PT)    Activity/Assistive Device (Transfer Goal 1, PT) transfers, all;walker, rolling  -MS     [place] Level/Cues Needed (Transfer Goal 1, PT) supervision required  -MS     Time Frame (Transfer Goal 1, PT) 1 week  -MS      Row Name 04/07/22 1534          Gait Training Goal 1 (PT)    Activity/Assistive Device (Gait Training Goal 1, PT) gait (walking locomotion)  -MS     [place] Level (Gait Training Goal 1, PT) contact guard required  -MS     Distance (Gait Training Goal 1, PT) 75  -MS     Time Frame (Gait Training Goal 1, PT) 1 week  -MS           User Key  (r) = Recorded By, (t) = Taken By, (c) = Cosigned By    Initials Name Provider Type    Goldie Lazar, PT Physical Therapist               Clinical Impression     Row Name 04/07/22 1533          Pain    Pretreatment Pain Rating 5/10  -MS     Posttreatment Pain Rating 5/10  -MS     Pain Location - Side/Orientation Left  -MS     Pain Location lower  -MS     Pain Location - extremity  -MS     Pain Intervention(s) Repositioned;Rest  -MS     Row Name 04/07/22 1533          Therapy Assessment/Plan (PT)    Rehab Potential (PT) good, to achieve stated therapy goals  -MS     Criteria for Skilled Interventions Met (PT) yes;meets criteria  -MS     Row Name 04/07/22 1533          Positioning and Restraints    Pre-Treatment Position in bed  -MS     Post Treatment Position bed  -MS     In Bed notified nsg;fowlers;call light within reach;encouraged to call for assist;with family/caregiver  -MS           User Key  (r) = Recorded By, (t) = Taken By, (c) = Cosigned By    Initials Name Provider Type    Goldie Lazar, PT Physical Therapist               Outcome Measures     Row Name 04/07/22 1534          How much help from another person do you currently need...    Turning from your back to your side while in flat bed without using bedrails? 2  -MS     Moving from lying on back to sitting on the side of a flat bed without bedrails? 2  -MS     Moving to and from a bed to a chair (including a wheelchair)? 1  -MS     Standing up from a chair using your arms (e.g., wheelchair, bedside chair)? 1  -MS     Climbing 3-5 steps with a railing? 1  -MS     To walk in hospital room? 1  -MS      -[facility] 6 Clicks Score (PT) 8  -MS     Row Name 04/07/22 1534          Functional Assessment    Outcome Measure Options AM-PAC 6 Clicks Basic Mobility (PT)  -MS           User Key  (r) = Recorded By, (t) = Taken By, (c) = Cosigned By    Initials Name Provider Type    MS Goldie Abrams, PT Physical Therapist                             Physical Therapy Education                 Title: PT OT SLP Therapies (In Progress)     Topic: Physical Therapy (In Progress)     Point: Mobility training (In Progress)     Learning Progress Summary           Patient Acceptance, E,TB, NR by MS at 4/7/2022 1535   Significant Other Acceptance, E,TB, NR by MS at 4/7/2022 1535                   Point: Home exercise program (In Progress)     Learning Progress Summary           Patient Acceptance, E,TB, NR by MS at 4/7/2022 1535   Significant Other Acceptance, E,TB, NR by MS at 4/7/2022 1535                   Point: Body mechanics (In Progress)     Learning Progress Summary           Patient Acceptance, E,TB, NR by MS at 4/7/2022 1535   Significant Other Acceptance, E,TB, NR by MS at 4/7/2022 1535                   Point: Precautions (In Progress)     Learning Progress Summary           Patient Acceptance, E,TB, NR by MS at 4/7/2022 1535   Significant Other Acceptance, E,TB, NR by MS at 4/7/2022 1535                               User Key     Initials Effective Dates Name Provider Type Discipline    MS 06/16/21 -  Goldie Abrams PT Physical Therapist PT              PT Recommendation and Plan  Planned Therapy Interventions (PT): balance training, bed mobility training, gait training, home exercise program, patient/family education, postural re-education, strengthening, stair training, ROM (range of motion), transfer training        Time Calculation:    PT Charges     Row Name 04/07/22 1529             Time Calculation    Start Time 1436  -MS      Stop Time 1501  -MS      Time Calculation (min) 25 min  -MS      PT Received On  04/07/22  -MS      PT - Next Appointment 04/08/22  -MS      PT Goal Re-Cert Due Date 04/14/22  -MS              Time Calculation- PT    Total Timed Code Minutes- PT 14 minute(s)  -MS            User Key  (r) = Recorded By, (t) = Taken By, (c) = Cosigned By    Initials Name Provider Type    Goldie Lazar, PT Physical Therapist              Therapy Charges for Today     Code Description Service Date Service Provider Modifiers Qty    77938409792 HC PT EVAL MOD COMPLEXITY 2 4/7/2022 Goldie Abrams, PT GP 1    [id number]  PT THER PROC EA 15 MIN 4/7/2022 Goldie Abrams, PT GP 1          PT G-Codes  Outcome Measure Options: AM-PAC 6 Clicks Basic Mobility (PT)  AM-PAC 6 Clicks Score (PT): 8    Goldie Abrams, PT  4/7/2022

## 2022-04-07 NOTE — PROGRESS NOTES
Patient is Current with [facility].  We will follow for DC plans and HH needs.

## 2022-04-07 NOTE — PROGRESS NOTES
"    Patient Name: Francisco Sequeira  :1935  86 y.o.      Patient Care Team:  Rubin Hinkle APRN as PCP - General (Family Medicine)  Audra Vazquez RPH as Pharmacist  Vasquez Niño PharmD as Pharmacist (Pharmacy)  Tatiana Tinoco MD as Consulting Physician (Gastroenterology)    Chief Complaint: hematoma    Interval History: no CP       Objective   Vital Signs  Temp:  [97.5 °F (36.4 °C)-98.4 °F (36.9 °C)] 98.3 °F (36.8 °C)  Heart Rate:  [78-84] 82  Resp:  [14-16] 16  BP: (112-143)/(58-69) 133/61    Intake/Output Summary (Last 24 hours) at 2022 1050  Last data filed at 2022 1522  Gross per 24 hour   Intake --   Output 300 ml   Net -300 ml     Flowsheet Rows    Flowsheet Row First Filed Value   Admission Height 182.9 cm (72\") Documented at 2022   Admission Weight 70.8 kg (156 lb) Documented at 2022 0047          Physical Exam:   General Appearance:    Alert, cooperative, in no acute distress   Lungs:     Clear to auscultation.  Normal respiratory effort and rate.      Heart:    irregular rhythm and normal rate, normal S1 and S2, no murmurs, gallops or rubs.  Crisp clicks   Chest Wall:    No abnormalities observed   Abdomen:     Soft, nontender, positive bowel sounds.     Extremities:   no cyanosis, clubbing or edema.  No marked joint deformities.        Results Review:    Results from last 7 days   Lab Units 22  0951   SODIUM mmol/L 138   POTASSIUM mmol/L 4.2   CHLORIDE mmol/L 101   CO2 mmol/L 32.0*   BUN mg/dL 11   CREATININE mg/dL 1.14   GLUCOSE mg/dL 161*   CALCIUM mg/dL 8.1*     Results from last 7 days   Lab Units 22  0002   TROPONIN T ng/mL 0.030     Results from last 7 days   Lab Units 22  2347 22  1653 22  0951   WBC 10*3/mm3  --   --  7.40   HEMOGLOBIN g/dL 8.2*   < > 7.8*  7.8*   HEMATOCRIT % 26.4*   < > 25.4*  25.4*   PLATELETS 10*3/mm3  --   --  213    < > = values in this interval not displayed.     Results from last 7 days   Lab Units " 04/05/22  2304 04/05/22  0000 04/04/22  0711   INR  1.20* 1.30 1.10                       Medication Review:   atorvastatin, 40 mg, Oral, Daily  digoxin, 62.5 mcg, Oral, Daily  famotidine, 20 mg, Oral, BID AC  ferrous sulfate, 325 mg, Oral, Every Other Day  furosemide, 40 mg, Oral, Daily  insulin lispro, 0-9 Units, Subcutaneous, TID AC  lactobacillus acidophilus, 1 capsule, Oral, Daily  magnesium oxide, 400 mg, Oral, BID  metoprolol succinate XL, 12.5 mg, Oral, Daily  sodium chloride, 10 mL, Intravenous, Q12H  vancomycin, 125 mg, Oral, Every Other Day              Assessment/Plan   Active Hospital Problems    Diagnosis  POA   • **Hematoma of iliopsoas muscle, left, initial encounter [S70.12XA]  Yes   • History of CVA (cerebrovascular accident) [Z86.73]  Not Applicable   • S/P laparoscopic cholecystectomy [Z90.49]  Not Applicable   • Anemia [D64.9]  Yes   • Chronic anticoagulation [Z79.01]  Not Applicable   • Stage 3b chronic kidney disease (HCC) [N18.32]  Yes   • H/O heart valve replacement with mechanical valve [Z95.2]  Not Applicable   • Type 2 diabetes mellitus (HCC) [E11.9]  Yes   • Hypertension [I10]  Yes   • Atrial fibrillation (HCC) [I48.91]  Yes      Resolved Hospital Problems   No resolved problems to display.     1. Left illiopsoas hematoma secondary to anticoagualtion - spontaneous. No reported trauma. Ortho saw. rec heat/ice. No surgical indication  2. Mechanical aortic valve-patient has a class I indication for resumption of anticoagulation once appropriate from a medical standpoint  3. Permanent atrial fibrillation, rate control on metoprolol and digoxin  4. Prior stroke with INR 2.2  5. Recent acute cholecystitis status post lap teja 3/2/22  6. Chronic anticoagulation with warfarin. Followed by med management clinic.  7. 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 Chronic diastolic heart failure  9. Pleural effusion , repeat thoracentesis x 3 (10/25/21, 11/11/21, 3/30/22)     As above, would  resume chronic anticoagulation once appropriate from medical standpoint, stable cardiac status with no active cardiac issues, we will sign off, please call if we can help in any way.    Jose Farrar III, MD  [facility]  04/07/22  10:50 EDT

## 2022-04-07 NOTE — PLAN OF CARE
Goal Outcome Evaluation:    Patient is a pleasant 86 y.o. male admitted to [facility] for L Hematoma of iliopsoas muscle on 4/5/2022. PMHx includes multiple hospitalizations recently where patient was current with  PTA. Patient is ambulatory at baseline and lives at home with spouse- ambulating with a walker and working towards using a cane. Today, patient performed bed mobility with maxA- unable to tolerate sitting EOB due to pain and weakness- significant L lateral lean and difficulty correcting. Noted L LE swelling as well as impaired sensation in L anterior thigh as well as MMT hip flexion 1/5. Patient may benefit from skilled PT services acutely to address functional deficits as well as improve level of independence prior to discharge. Pending progress and medical status, patient and spouse are hopeful to return home with HH but may need SNF for continued rehab upon DC. Currently not safe to DC home at this time.

## 2022-04-08 ENCOUNTER — HOME CARE VISIT (OUTPATIENT)
Dept: HOME HEALTH SERVICES | Facility: HOME HEALTHCARE | Age: 87
End: 2022-04-08

## 2022-04-08 ENCOUNTER — APPOINTMENT (OUTPATIENT)
Dept: CT IMAGING | Facility: HOSPITAL | Age: 87
End: 2022-04-08

## 2022-04-08 LAB
ABO GROUP BLD: NORMAL
ANION GAP SERPL CALCULATED.3IONS-SCNC: 8 MMOL/L (ref 5–15)
BLD GP AB SCN SERPL QL: NEGATIVE
BUN SERPL-MCNC: 13 MG/DL (ref 8–23)
BUN/CREAT SERPL: 10.2 (ref 7–25)
CALCIUM SPEC-SCNC: 8 MG/DL (ref 8.6–10.5)
CHLORIDE SERPL-SCNC: 101 MMOL/L (ref 98–107)
CO2 SERPL-SCNC: 30 MMOL/L (ref 22–29)
CREAT SERPL-MCNC: 1.28 MG/DL (ref 0.76–1.27)
DEPRECATED RDW RBC AUTO: 52.3 FL (ref 37–54)
EGFRCR SERPLBLD CKD-EPI 2021: 55.2 ML/MIN/1.73
ERYTHROCYTE [DISTWIDTH] IN BLOOD BY AUTOMATED COUNT: 16.9 % (ref 12.3–15.4)
GLUCOSE BLDC GLUCOMTR-MCNC: 107 MG/DL (ref 70–130)
GLUCOSE BLDC GLUCOMTR-MCNC: 139 MG/DL (ref 70–130)
GLUCOSE BLDC GLUCOMTR-MCNC: 242 MG/DL (ref 70–130)
GLUCOSE SERPL-MCNC: 165 MG/DL (ref 65–99)
HCT VFR BLD AUTO: 24.4 % (ref 37.5–51)
HGB BLD-MCNC: 7.4 G/DL (ref 13–17.7)
INR PPP: 1.19 (ref 0.9–1.1)
MAGNESIUM SERPL-MCNC: 2 MG/DL (ref 1.6–2.4)
MCH RBC QN AUTO: 26 PG (ref 26.6–33)
MCHC RBC AUTO-ENTMCNC: 30.3 G/DL (ref 31.5–35.7)
MCV RBC AUTO: 85.6 FL (ref 79–97)
PHOSPHATE SERPL-MCNC: 3.1 MG/DL (ref 2.5–4.5)
PLATELET # BLD AUTO: 238 10*3/MM3 (ref 140–450)
PMV BLD AUTO: 11.9 FL (ref 6–12)
POTASSIUM SERPL-SCNC: 4.2 MMOL/L (ref 3.5–5.2)
PROTHROMBIN TIME: 15 SECONDS (ref 11.7–14.2)
RBC # BLD AUTO: 2.85 10*6/MM3 (ref 4.14–5.8)
RH BLD: POSITIVE
SODIUM SERPL-SCNC: 139 MMOL/L (ref 136–145)
T&S EXPIRATION DATE: NORMAL
WBC NRBC COR # BLD: 8.37 10*3/MM3 (ref 3.4–10.8)

## 2022-04-08 PROCEDURE — 86923 COMPATIBILITY TEST ELECTRIC: CPT

## 2022-04-08 PROCEDURE — 80048 BASIC METABOLIC PNL TOTAL CA: CPT | Performed by: STUDENT IN AN ORGANIZED HEALTH CARE EDUCATION/TRAINING PROGRAM

## 2022-04-08 PROCEDURE — 84100 ASSAY OF PHOSPHORUS: CPT | Performed by: STUDENT IN AN ORGANIZED HEALTH CARE EDUCATION/TRAINING PROGRAM

## 2022-04-08 PROCEDURE — 73700 CT LOWER EXTREMITY W/O DYE: CPT

## 2022-04-08 PROCEDURE — 82962 GLUCOSE BLOOD TEST: CPT

## 2022-04-08 PROCEDURE — 97530 THERAPEUTIC ACTIVITIES: CPT

## 2022-04-08 PROCEDURE — 83735 ASSAY OF MAGNESIUM: CPT | Performed by: STUDENT IN AN ORGANIZED HEALTH CARE EDUCATION/TRAINING PROGRAM

## 2022-04-08 PROCEDURE — 36415 COLL VENOUS BLD VENIPUNCTURE: CPT | Performed by: STUDENT IN AN ORGANIZED HEALTH CARE EDUCATION/TRAINING PROGRAM

## 2022-04-08 PROCEDURE — 85027 COMPLETE CBC AUTOMATED: CPT | Performed by: STUDENT IN AN ORGANIZED HEALTH CARE EDUCATION/TRAINING PROGRAM

## 2022-04-08 PROCEDURE — 85610 PROTHROMBIN TIME: CPT | Performed by: STUDENT IN AN ORGANIZED HEALTH CARE EDUCATION/TRAINING PROGRAM

## 2022-04-08 PROCEDURE — 86850 RBC ANTIBODY SCREEN: CPT | Performed by: STUDENT IN AN ORGANIZED HEALTH CARE EDUCATION/TRAINING PROGRAM

## 2022-04-08 PROCEDURE — 86900 BLOOD TYPING SEROLOGIC ABO: CPT | Performed by: STUDENT IN AN ORGANIZED HEALTH CARE EDUCATION/TRAINING PROGRAM

## 2022-04-08 PROCEDURE — 86901 BLOOD TYPING SEROLOGIC RH(D): CPT | Performed by: STUDENT IN AN ORGANIZED HEALTH CARE EDUCATION/TRAINING PROGRAM

## 2022-04-08 PROCEDURE — 63710000001 INSULIN LISPRO (HUMAN) PER 5 UNITS: Performed by: NURSE PRACTITIONER

## 2022-04-08 RX ORDER — NITROGLYCERIN 0.4 MG/1
0.4 TABLET SUBLINGUAL
Status: DISCONTINUED | OUTPATIENT
Start: 2022-04-08 | End: 2022-04-20 | Stop reason: HOSPADM

## 2022-04-08 RX ADMIN — FAMOTIDINE 20 MG: 20 TABLET ORAL at 17:34

## 2022-04-08 RX ADMIN — MAGNESIUM OXIDE 400 MG (241.3 MG MAGNESIUM) TABLET 400 MG: TABLET at 20:00

## 2022-04-08 RX ADMIN — ATORVASTATIN CALCIUM 40 MG: 20 TABLET, FILM COATED ORAL at 08:13

## 2022-04-08 RX ADMIN — Medication 10 ML: at 08:13

## 2022-04-08 RX ADMIN — FERROUS SULFATE TAB 325 MG (65 MG ELEMENTAL FE) 325 MG: 325 (65 FE) TAB at 08:13

## 2022-04-08 RX ADMIN — INSULIN LISPRO 4 UNITS: 100 INJECTION, SOLUTION INTRAVENOUS; SUBCUTANEOUS at 12:13

## 2022-04-08 RX ADMIN — FAMOTIDINE 20 MG: 20 TABLET ORAL at 08:13

## 2022-04-08 RX ADMIN — METOPROLOL SUCCINATE 12.5 MG: 25 TABLET, EXTENDED RELEASE ORAL at 08:13

## 2022-04-08 RX ADMIN — DIGOXIN 62.5 MCG: 125 TABLET ORAL at 12:13

## 2022-04-08 RX ADMIN — VANCOMYCIN HYDROCHLORIDE 125 MG: 125 CAPSULE ORAL at 10:33

## 2022-04-08 RX ADMIN — MAGNESIUM OXIDE 400 MG (241.3 MG MAGNESIUM) TABLET 400 MG: TABLET at 08:13

## 2022-04-08 RX ADMIN — Medication 1 CAPSULE: at 08:13

## 2022-04-08 RX ADMIN — Medication 10 ML: at 20:00

## 2022-04-08 NOTE — THERAPY TREATMENT NOTE
Patient Name: Francisco Sequeira  : 1935    MRN: [id number]                              Today's Date: 2022       Admit Date: 2022    Visit Dx:     ICD-10-CM ICD-9-CM   1. Hematoma of iliopsoas muscle, left, initial encounter  S70.12XA 924.00   2. Chronic anticoagulation  Z79.01 V58.61   3. H/O heart valve replacement with mechanical valve  Z95.2 V43.3   4. Immobility syndrome  M62.3 728.3   5. Anemia, unspecified type  D64.9 285.9   6. 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)   • Iron deficiency anemia   • Chronic anticoagulation   • Hemiparesis of left nondominant side as late effect of cerebral infarction (HCC)   • Rectus sheath hematoma   • Sepsis (HCC)   • Calculus of gallbladder with acute cholecystitis without obstruction   • History of Clostridium difficile infection   • Aspiration pneumonitis (HCC)   • Hematoma of iliopsoas muscle, left, initial encounter   • History of CVA (cerebrovascular accident)   • S/P laparoscopic cholecystectomy   • Anemia     Past Medical History:   Diagnosis Date   • Allergic rhinitis    • Aortic valve insufficiency    • Ascending aortic aneurysm (HCC)    • Atrial fibrillation (HCC)    • Bacteremia    • Calcific aortic stenosis of bicuspid valve    • Cardiac arrest (HCC)    • Cardiomyopathy (HCC)    • CKD (chronic kidney disease) stage 3, GFR 30-59 ml/min (HCC)    • Contact dermatitis due to poison ivy    • Elbow fracture    • Esophageal reflux    • GERD (gastroesophageal reflux disease)    • Head  injury    • History of transfusion    • Hyperglycemia    • Hyperlipidemia    • Hypertension    • Kidney carcinoma (HCC)    • Nonischemic cardiomyopathy (HCC)    • Renal oncocytoma    • Seasonal allergic reaction    • Sinusitis    • Syncope    • Type 2 diabetes mellitus (HCC)     uncontrolled   • Visual field defect      Past Surgical History:   Procedure Laterality Date   • AORTIC VALVE REPAIR/REPLACEMENT     • ASCENDING AORTIC ANEURYSM REPAIR W/ MECHANICAL AORTIC VALVE REPLACEMENT     • CHOLECYSTECTOMY WITH INTRAOPERATIVE CHOLANGIOGRAM N/A 3/29/2022    Procedure: Laparoscopic cholecystectomy with cholangiogram, possible open;  Surgeon: Lisa Guidry MD;  Location: [facility] MAIN OR;  Service: General;  Laterality: N/A;   • COLONOSCOPY N/A 10/28/2021    Procedure: COLONOSCOPY to cecum:  cold snare polyps,;  Surgeon: Dinesh Meza MD;  Location: [facility] ENDOSCOPY;  Service: Gastroenterology;  Laterality: N/A;  pre:  Iron deficiency anemia  post:  polyps, diverticulosis,    • COLONOSCOPY N/A 11/9/2021    Procedure: COLONOSCOPY to cecum with APC cautery of AVM and clip placement x1;  Surgeon: Pavan Rodriguez MD;  Location: [facility] ENDOSCOPY;  Service: Gastroenterology;  Laterality: N/A;  PRE - gi bleed, anemia  POST - diverticulosis, poor prep, AVM right colon   • ENDOSCOPY N/A 10/28/2021    Procedure: ESOPHAGOGASTRODUODENOSCOPY with biopsies;  Surgeon: Dinesh Meza MD;  Location: [facility] ENDOSCOPY;  Service: Gastroenterology;  Laterality: N/A;  pre:  Iron deficiency anemia  post:  duodenitis and gastritis   • EYE SURGERY  12/09/2020    cataract surgery    • NEPHRECTOMY     • OTHER SURGICAL HISTORY      elbow surgery   • OTHER SURGICAL HISTORY      right arm surgery   • PROSTATE SURGERY     • THORACENTESIS Left     diagnostic      General Information     Row Name 04/08/22 160          Physical Therapy Time and Intention    Document Type therapy note (daily note)  -AG     Mode of Treatment physical  therapy;individual therapy  -     Row Name 04/08/22 1607          General Information    Patient Profile Reviewed yes  -AG     Existing Precautions/Restrictions fall  -AG           User Key  (r) = Recorded By, (t) = Taken By, (c) = Cosigned By    Initials Name Provider Type    Tyra Ramirez, CICI Physical Therapist               Mobility     Row Name 04/08/22 1607          Bed Mobility    Bed Mobility supine-sit;sit-supine;scooting/bridging  -AG     Scooting/Bridging [place] (Bed Mobility) minimum assist (75% patient effort)  seated L lateral scooting  -AG     Supine-Sit [place] (Bed Mobility) minimum assist (75% patient effort)  -AG     Sit-Supine [place] (Bed Mobility) 1 person assist  -AG     Assistive Device (Bed Mobility) bed rails;head of bed elevated  -AG     Row Name 04/08/22 1607          Sit-Stand Transfer    Sit-Stand [place] (Transfers) minimum assist (75% patient effort);1 person assist;verbal cues  -AG     Assistive Device (Sit-Stand Transfers) walker, front-wheeled  -AG     Comment, (Sit-Stand Transfer) sit<>Stand 2x throughout session, cuing provided for correct hand placement. extra time required for set up to complete stand. cuing for upright posture, extending UEs and LEs in stance. Pt able to maintain first standing trial for 2-3min, second standing trial <30seconds. Pt unable to lift/advance BLEs, upon attempting to lift LLE pt R knee buckled therefore PT provided R knee blocking for further attempt to lift LLE however pt unable to lift heel or foot off floor.  -AG           User Key  (r) = Recorded By, (t) = Taken By, (c) = Cosigned By    Initials Name Provider Type    Tyra Ramirez PT Physical Therapist               Obj/Interventions     Row Name 04/08/22 1610          Range of Motion Comprehensive    Comment, General Range of Motion PROM provided for LLE for knee/hip flexion to assess for pain, pt able to tolerate WFL PROM, but unable to actively lift LLE and  "minimal muscle activiation noted for AAROM for heel sliding, pt responds \"I can't\"  -           User Key  (r) = Recorded By, (t) = Taken By, (c) = Cosigned By    Initials Name Provider Type    Tyra Ramirez, PT Physical Therapist               Goals/Plan    No documentation.                Clinical Impression     Row Name 04/08/22 1611          Pain    Pre/Posttreatment Pain Comment Pt reports pain has been knee and below. pt denies pain where noted illiopsoas hematoma is. Pt reports minimal pain throughout mobility and positioned to comfort at end of session. pt reports pain occurs with mobility or weight bearing.  -     Pain Intervention(s) Repositioned  -     Row Name 04/08/22 1611          Plan of Care Review    Plan of Care Reviewed With patient;spouse  -     Outcome Evaluation Pt able to progress to standing with mod A for sit<>stand however unable to weight shift or lift/advance LEs to progress to ambulation. Pt continues to present with impaired activity tolerance/endurance and variable standing tolerance from <30seconds up to 2-3minutes however requires significant cuing for knee, trunk, and UE extension to obtain upright stance. Pt presents with impaired weight shifting and requires knee blocking when attempting to lift contralateral LEs, pt did have R knee buckle with first attempt of lifting LLE. Pt will continue to benefit from acute skilled PT to further increase overall strength, balance, endurance, safety and maximize independence with least restrictive AD. of note pt denies pain at site of illiopsoas hematoma and reports pain is at knee and below. Pt is not safe to d/c home at this time and will need SNF placement upon d/c.  -     Row Name 04/08/22 1611          Therapy Assessment/Plan (PT)    Rehab Potential (PT) good, to achieve stated therapy goals  -     Criteria for Skilled Interventions Met (PT) yes;meets criteria  -     Row Name 04/08/22 1611          Vital Signs    O2 " Delivery Pre Treatment room air  -AG     O2 Delivery Intra Treatment room air  -AG     O2 Delivery Post Treatment room air  -AG     Row Name 04/08/22 1611          Positioning and Restraints    Pre-Treatment Position in bed  -AG     Post Treatment Position bed  -AG     In Bed exit alarm on;encouraged to call for assist;call light within reach;with family/caregiver  -AG           User Key  (r) = Recorded By, (t) = Taken By, (c) = Cosigned By    Initials Name Provider Type    Tyra Ramirez, CICI Physical Therapist               Outcome Measures     Row Name 04/08/22 1617          How much help from another person do you currently need...    Turning from your back to your side while in flat bed without using bedrails? 3  -AG     Moving from lying on back to sitting on the side of a flat bed without bedrails? 3  -AG     Moving to and from a bed to a chair (including a wheelchair)? 1  -AG     Standing up from a chair using your arms (e.g., wheelchair, bedside chair)? 1  -AG     Climbing 3-5 steps with a railing? 1  -AG     To walk in hospital room? 1  -AG     AM-PAC 6 Clicks Score (PT) 10  -AG           User Key  (r) = Recorded By, (t) = Taken By, (c) = Cosigned By    Initials Name Provider Type    Tyra Ramirez PT Physical Therapist                             Physical Therapy Education                 Title: PT OT SLP Therapies (In Progress)     Topic: Physical Therapy (In Progress)     Point: Mobility training (In Progress)     Learning Progress Summary           Patient Acceptance, E,TB, NR by MS at 4/7/2022 1535   Significant Other Acceptance, E,TB, NR by MS at 4/7/2022 1535                   Point: Home exercise program (In Progress)     Learning Progress Summary           Patient Acceptance, E,TB, NR by MS at 4/7/2022 1535   Significant Other Acceptance, E,TB, NR by MS at 4/7/2022 1535                   Point: Body mechanics (In Progress)     Learning Progress Summary           Patient Acceptance, E,TB,  NR by MS at 4/7/2022 1535   Significant Other Acceptance, E,TB, NR by MS at 4/7/2022 1535                   Point: Precautions (In Progress)     Learning Progress Summary           Patient Acceptance, E,TB, NR by MS at 4/7/2022 1535   Significant Other Acceptance, E,TB, NR by MS at 4/7/2022 1535                               User Key     Initials Effective Dates Name Provider Type Discipline    MS 06/16/21 -  Goldie Abrams, PT Physical Therapist PT              PT Recommendation and Plan     Plan of Care Reviewed With: patient, spouse  Outcome Evaluation: Pt able to progress to standing with mod A for sit<>stand however unable to weight shift or lift/advance LEs to progress to ambulation. Pt continues to present with impaired activity tolerance/endurance and variable standing tolerance from <30seconds up to 2-3minutes however requires significant cuing for knee, trunk, and UE extension to obtain upright stance. Pt presents with impaired weight shifting and requires knee blocking when attempting to lift contralateral LEs, pt did have R knee buckle with first attempt of lifting LLE. Pt will continue to benefit from acute skilled PT to further increase overall strength, balance, endurance, safety and maximize independence with least restrictive AD. of note pt denies pain at site of illiopsoas hematoma and reports pain is at knee and below. Pt is not safe to d/c home at this time and will need SNF placement upon d/c.     Time Calculation:    PT Charges     Row Name 04/08/22 1618             Time Calculation    Start Time 1506  -AG      Stop Time 1534  -AG      Time Calculation (min) 28 min  -AG      PT Received On 04/08/22  -AG      PT - Next Appointment 04/09/22  -AG      PT Goal Re-Cert Due Date 04/14/22  -AG              Time Calculation- PT    Total Timed Code Minutes- PT 28 minute(s)  -AG              Timed Charges    89523 - PT Therapeutic Activity Minutes 28  -AG              Total Minutes    Timed Charges  Total Minutes 28  -AG       Total Minutes 28  -AG            User Key  (r) = Recorded By, (t) = Taken By, (c) = Cosigned By    Initials Name Provider Type    Tyra Ramirez, PT Physical Therapist              Therapy Charges for Today     Code Description Service Date Service Provider Modifiers Qty    [id number]  PT THERAPEUTIC ACT EA 15 MIN 4/8/2022 Tyra Triplett, PT GP 2          PT G-Codes  Outcome Measure Options: AM-PAC 6 Clicks Basic Mobility (PT)  AM-PAC 6 Clicks Score (PT): 10    Tyra Triplett PT  4/8/2022

## 2022-04-08 NOTE — PLAN OF CARE
Goal Outcome Evaluation:  Plan of Care Reviewed With: patient, spouse           Outcome Evaluation: Pt able to progress to standing with mod A for sit<>stand however unable to weight shift or lift/advance LEs to progress to ambulation. Pt continues to present with impaired activity tolerance/endurance and variable standing tolerance from <30seconds up to 2-3minutes however requires significant cuing for knee, trunk, and UE extension to obtain upright stance. Pt presents with impaired weight shifting and requires knee blocking when attempting to lift contralateral LEs, pt did have R knee buckle with first attempt of lifting LLE. Pt will continue to benefit from acute skilled PT to further increase overall strength, balance, endurance, safety and maximize independence with least restrictive AD. of note pt denies pain at site of illiopsoas hematoma and reports pain is at knee and below. Pt is not safe to d/c home at this time and will need SNF placement upon d/c.

## 2022-04-08 NOTE — CONSULTS
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     Patient is a very pleasant 86-year-old gentleman who was hospitalized for complaints of left lower extremity pain and weakness.  Patient has a long history here.  He was actually hospitalized previously this month for other medical issues.  He is anticoagulated for mechanical aortic valve.  The patient also has a history of a stroke in 2020.  He was diagnosed with a left psoas hematoma.  His anticoagulation has been reversed to allow hemoglobin to stabilize.  Orthopedics is consulted for evaluation of this hematoma.  Of note, over the last few days with the patient has been working with physical therapy he has pain in the left lower extremity as well as profound weakness and inability to stand on that leg.  Orthopedics is consulted for further evaluation.  The patient endorses that although he did have a stroke in 2020 he did not have any significant residual deficits in the left lower extremity and was previously ambulatory with a cane and/or walker prior to this now he has an inability to stand without full assist with physical  therapy.  Allergies:   Allergies   Allergen Reactions   • Other Other (See Comments)     Grass, ragweed   • Penicillins Swelling   • Percocet [Oxycodone-Acetaminophen] Nausea And Vomiting       Medications:   Home Medications:  Medications Prior to Admission   Medication Sig Dispense Refill Last Dose   • aspirin 81 MG EC tablet Take 1 tablet by mouth Daily. 90 tablet 0 4/5/2022 at Unknown time   • atorvastatin (LIPITOR) 40 MG tablet Take 40 mg by mouth Daily.   4/5/2022 at Unknown time   • digoxin (LANOXIN) 125 MCG tablet TAKE ONE TABLET BY MOUTH DAILY (Patient taking differently: Take 62.5 mcg by mouth Daily.) 90 tablet 1 4/5/2022 at Unknown time   • diphenhydrAMINE (BENADRYL) 25 mg capsule Take 1 capsule by mouth 2 (Two) Times a Day As Needed for Itching, Allergies or Sleep.   4/5/2022 at Unknown time   • enoxaparin (LOVENOX) 80 MG/0.8ML solution syringe Discard 0.1 ml and Inject 0.7 mL under the skin into the appropriate area as directed Every 12 (Twelve) Hours. Continue until INR is therapeutic. 22.4 mL 6 4/5/2022 at Unknown time   • famotidine (PEPCID) 20 MG tablet Take 1 tablet by mouth 2 (Two) Times a Day Before Meals for 30 days. 60 tablet 0 4/5/2022 at Unknown time   • ferrous gluconate (FERGON) 324 MG tablet Take 1 tablet by mouth Every Other Day. 90 tablet 1 Past Week at Unknown time   • furosemide (Lasix) 40 MG tablet Take 1 tablet by mouth Daily. 90 tablet 1 4/5/2022 at Unknown time   • Insulin Degludec (TRESIBA FLEXTOUCH SC) Inject 30-65 Units under the skin into the appropriate area as directed Daily As Needed. ---NOT USING THE TRESHIBA.------   4/5/2022 at Unknown time   • Insulin Disposable Pump (V-Go 40) kit USE AS DIRECTED THREE TIMES A DAY 30 each 11 4/5/2022 at Unknown time   • insulin lispro (humaLOG) 100 UNIT/ML injection USE AS DIRECTED WITH V-GO PUMP 30 mL 6 4/5/2022 at Unknown time   • lactobacillus acidophilus (RISAQUAD) capsule capsule Take 1 capsule by mouth Daily for 30 days. 30 capsule  0 Past Week at Unknown time   • magnesium oxide (MAG-OX) 400 MG tablet Take 400 mg by mouth 2 (Two) Times a Day.   4/5/2022 at Unknown time   • metoprolol succinate XL (TOPROL-XL) 25 MG 24 hr tablet Take 0.5 tablets by mouth Daily for 30 days. 45 tablet 3 Past Week at Unknown time   • Omega-3 Fatty Acids (FISH OIL) 1000 MG capsule capsule Take 4,000 mg by mouth Daily With Breakfast.   4/5/2022 at Unknown time   • saccharomyces boulardii (FLORASTOR) 250 MG capsule Take 1 capsule by mouth 2 (Two) Times a Day for 30 days. 60 capsule 0 4/5/2022 at Unknown time   • vancomycin (VANCOCIN) 125 MG capsule Take 1 capsule by mouth Every Other Day for 7 doses. 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 7 capsule 0 Past Week at Unknown time   • warfarin (COUMADIN) 5 MG tablet Take 1.5 tablets by mouth Every Night. 115 tablet 1 Past Week at Unknown time       Current Medications:  Scheduled Meds:atorvastatin, 40 mg, Oral, Daily  digoxin, 62.5 mcg, Oral, Daily  famotidine, 20 mg, Oral, BID AC  ferrous sulfate, 325 mg, Oral, Every Other Day  insulin lispro, 0-9 Units, Subcutaneous, TID AC  lactobacillus acidophilus, 1 capsule, Oral, Daily  magnesium oxide, 400 mg, Oral, BID  metoprolol succinate XL, 12.5 mg, Oral, Daily  sodium chloride, 10 mL, Intravenous, Q12H  vancomycin, 125 mg, Oral, Every Other Day      Continuous Infusions:   PRN Meds:.•  acetaminophen **OR** acetaminophen **OR** acetaminophen  •  calcium carbonate  •  dextrose  •  dextrose  •  diphenhydrAMINE  •  glucagon (human recombinant)  •  ondansetron **OR** ondansetron  •  [COMPLETED] Insert peripheral IV **AND** sodium chloride  •  [COMPLETED] Insert peripheral IV **AND** sodium chloride  •  sodium chloride    Past Medical History:   Diagnosis Date   • Allergic rhinitis    • Aortic valve insufficiency    • Ascending aortic aneurysm (HCC)    • Atrial fibrillation (HCC)    • Bacteremia    • Calcific aortic stenosis of bicuspid valve    •  Cardiac arrest (HCC)    • Cardiomyopathy (HCC)    • CKD (chronic kidney disease) stage 3, GFR 30-59 ml/min (HCC)    • Contact dermatitis due to poison ivy    • Elbow fracture    • Esophageal reflux    • GERD (gastroesophageal reflux disease)    • Head injury    • History of transfusion    • Hyperglycemia    • Hyperlipidemia    • Hypertension    • Kidney carcinoma (HCC)    • Nonischemic cardiomyopathy (HCC)    • Renal oncocytoma    • Seasonal allergic reaction    • Sinusitis    • Syncope    • Type 2 diabetes mellitus (HCC)     uncontrolled   • Visual field defect      Past Surgical History:   Procedure Laterality Date   • AORTIC VALVE REPAIR/REPLACEMENT     • ASCENDING AORTIC ANEURYSM REPAIR W/ MECHANICAL AORTIC VALVE REPLACEMENT     • CHOLECYSTECTOMY WITH INTRAOPERATIVE CHOLANGIOGRAM N/A 3/29/2022    Procedure: Laparoscopic cholecystectomy with cholangiogram, possible open;  Surgeon: Lisa Guidry MD;  Location: [facility] MAIN OR;  Service: General;  Laterality: N/A;   • COLONOSCOPY N/A 10/28/2021    Procedure: COLONOSCOPY to cecum:  cold snare polyps,;  Surgeon: Dinesh Meza MD;  Location: [facility] ENDOSCOPY;  Service: Gastroenterology;  Laterality: N/A;  pre:  Iron deficiency anemia  post:  polyps, diverticulosis,    • COLONOSCOPY N/A 11/9/2021    Procedure: COLONOSCOPY to cecum with APC cautery of AVM and clip placement x1;  Surgeon: Pavan Rodriguez MD;  Location: [facility] ENDOSCOPY;  Service: Gastroenterology;  Laterality: N/A;  PRE - gi bleed, anemia  POST - diverticulosis, poor prep, AVM right colon   • ENDOSCOPY N/A 10/28/2021    Procedure: ESOPHAGOGASTRODUODENOSCOPY with biopsies;  Surgeon: Dinesh Meza MD;  Location: [facility] ENDOSCOPY;  Service: Gastroenterology;  Laterality: N/A;  pre:  Iron deficiency anemia  post:  duodenitis and gastritis   • EYE SURGERY  12/09/2020    cataract surgery    • NEPHRECTOMY     • OTHER SURGICAL HISTORY      elbow surgery   • OTHER SURGICAL HISTORY       right arm surgery   • PROSTATE SURGERY     • THORACENTESIS Left     diagnostic     Social History     Occupational History   • Not on file   Tobacco Use   • Smoking status: Former Smoker   • Smokeless tobacco: Former User   • Tobacco comment: caffeine use   Vaping Use   • Vaping Use: Never used   Substance and Sexual Activity   • Alcohol use: Yes     Comment: occasional   • Drug use: No   • Sexual activity: Defer      Social History     Social History Narrative   • Not on file     Family History   Problem Relation Age of Onset   • Cancer Mother         colon   • Cancer Brother         colon         Review of Systems:   Constitutional: Negative for fatigue, fever, or weight loss  HEENT: Patient denies any headaches, vision changes, sore throat. Admits to wearing glasses  Pulmonary: Patient denies any cough, congestion, SOA, or wheezing  Cardiovascular: Patient denies any chest pain, palpitations, weakness,  Gastrointestinal:  Patient denies nausea, vomiting, diarrhea, constipation  Genital/Urinary: Patient denies dysuria, urinary frequency, urgency, incontinence, retention  Musculoskeletal: Positive for left leg pain and weakness. Negative for muscle cramps  Neurological: Patient denies dizziness, paresthesia, loss of sensation, seizures, syncope  Endocrine system: Patient denies tremors, cold or heat intolerance  Psychological: Patient denies thoughts/plans or harming self or other; hallucinations,  insomnia  Skin: Patient denies any bruising, rashes, lesions, or ulcers   Hematopoietic: Patient denies history of MRSA or slow wound healing,  spontaneous or excessive bleeding    Physical Exam: 86 y.o. male  Vitals:    04/08/22 0057 04/08/22 0742 04/08/22 1241 04/08/22 1429   BP: 118/60 131/60 137/76 130/57   BP Location: Left arm Left arm Right arm Right arm   Patient Position: Lying Lying Lying Lying   Pulse:   83 89   Resp: 18 18 18 18   Temp: 97.8 °F (36.6 °C) 98.2 °F (36.8 °C) 97.9 °F (36.6 °C)    TempSrc: Oral  "Oral Oral    SpO2:  96% 99% 100%   Weight:   68.5 kg (151 lb 0.2 oz)    Height:   182.9 cm (72\")                             General Appearance:  Alert, cooperative, in no acute distress    HEENT:    Normocephalic,  Atraumatic, Pupils are equal   Neck:   No adenopathy, supple, trachea midline, no thyromegaly       Lungs:     Clear to auscultation, equal expansion bilaterally, respirations regular, even and               unlabored    Heart:    Regular rhythm and normal rate, normal S1 and S2, no murmur, no gallops   Chest Wall:    Within normal limits   Abdomen:     Normal bowel sounds, no masses,  soft non-tender, non-distended,       Rectal:  Musculoskeletal:     Deferred  The patient's left lower extremity is evaluated he has 1/5 strength with hip flexion on the left side.  He has tingling and decreased sensation to the anterior thigh.  Sensation is intact to light touch in the superficial peroneal deep peroneal saphenous sural and tibial nerve distributions.  The patient has no pain with logroll of the hip.  He has no pain with passive hip flexion however he can not maintain hip flexion.   Extremities:   No clubbing, no edema, no cyanosis   Pulses  Neurovascular:   Pulses palpable and equal bilaterally in all 4 extremities    Patient was alert and oriented x 3 spheres   Skin:   No lesions, ulcers or rashes   Neurologic:   Cranial nerves 2 - 12 grossly intact, sensation intact            Diagnostic Tests:    CT of the abdomen and pelvis is reviewed and demonstrates a fairly large hematoma and what appears to be an avulsion of the insertion of the left iliopsoas.  The hematoma progresses into the iliac is muscle.  There is evidence that this is abutting the femoral sheath as well.      Assessment:  Patient Active Problem List   Diagnosis   • Atrial fibrillation (HCC)   • Hypertension   • Atopic rhinitis   • Gastroesophageal reflux disease   • Hyperlipidemia   • Type 2 diabetes mellitus (HCC)   • Low testosterone "   • H/O heart valve replacement with mechanical valve   • Kidney carcinoma (HCC)   • Stage 3b chronic kidney disease (HCC)   • BYRON (acute kidney injury) (HCC)   • 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)   • Iron deficiency anemia   • Chronic anticoagulation   • Hemiparesis of left nondominant side as late effect of cerebral infarction (HCC)   • Rectus sheath hematoma   • Sepsis (HCC)   • Calculus of gallbladder with acute cholecystitis without obstruction   • History of Clostridium difficile infection   • Aspiration pneumonitis (HCC)   • Hematoma of iliopsoas muscle, left, initial encounter   • History of CVA (cerebrovascular accident)   • S/P laparoscopic cholecystectomy   • Anemia       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  Unfortunately, this is a difficult situation given his anticoagulation.  First and foremost, we need to allow his hemoglobin to stabilize before resuming anticoagulation.  Secondly, I discussed the situation is a little more complex now that it appears he has developed a femoral nerve palsy over the last day or 2.  Unfortunately, I do feel that this is from the compression of the hematoma against the femoral nerve.  This is a complication that can arise from this.  He also has decreased sensation in the left anterior thigh, indicating this is more than just muscle weakness from the muscle tear.  I discussed with the patient that I think it would be worth a consultation with interventional radiology to have this aspirated.  Removing pressure from the femoral nerve would be the best chance to allow recovery however even with this it could take many months and may ultimately not demonstrate full recovery.  I also advised him that there is a chance even with successful aspiration the fluid could reaccumulate.  He voices understanding  and is in agreement with attempting an IR aspiration of the left iliopsoas hematoma    Recommend continuing physical therapy as able  Weightbearing as tolerated to left lower extremity with assist  Continue holding anticoagulation for now.    Date: 4/8/2022  Maxi Pham MD  Orthopaedic Surgeon    [facility]  [phone]

## 2022-04-08 NOTE — PROGRESS NOTES
"Enter Query Response Below      Query Response:     Left illiopsoas hematoma secondary to anticoagulation              If applicable, please update the problem list.     Patient: Francisco Sequeira        : 1935  Account: [id number]           Admit Date:         Options to Respond to Query:    1. Access the Encounter     a. From the To-Do Side bar, click Respond With Note.     b. Click New Note     c. Answer query within the yellow box.                d. Update the Problem List if applicable.         86 year old man admitted with Left Iliopsoas hematoma after recent admission for cholecystectomy and aspiration pneumonia. Had been discharged home on warfarin  and Lovenox for bridge. Cardiology consult notes  &  state: \"Left illiopsoas hematoma secondary to anticoagulation - spontaneous.\"  Treatment: anticoagulation on hold, aspirin discontinued, lab monitoring    Please document if the hematoma can be further specified as:  - Left illiopsoas hematoma secondary to anticoagulation   - hematoma unrelated to anticoagulation  - other, please specify  - unable to determine     By submitting this query, we are merely seeking further clarification of documentation to accurately reflect all conditions that you are monitoring, evaluating, treating or that extend the hospitalization or utilize additional resources of care. Please utilize your independent clinical judgment when addressing the question(s) above.     This query and your response, once completed, will be entered into the legal medical record.    Sincerely,   Jocy Moore RN, BSN  [email or web address]  Clinical Documentation Integrity Program  "

## 2022-04-08 NOTE — PAYOR COMM NOTE
"Hua Laura AMIN (86 y.o. Male)     ATTN: CONTINUED STAY CLINICALS TO REVIEW: [id number]    PLEASE REPLY WITH APPROVED DAYS TO UR DEPT: [phone]  [phone]              Date of Birth   1935    Social Security Number       Address   [address] [place] [address]    Home Phone   [phone]    MRN   [id number]       Christianity   Yarsani    Marital Status                               Admission Date   4/5/22    Admission Type   Emergency    Admitting Provider   Bowen Coughlin MD    Attending Provider   Bowen Coughlin MD    Department, Room/Bed   [address], [address]/1       Discharge Date       Discharge Disposition       Discharge Destination                               Attending Provider: Bowen Coughlin MD    Allergies: Other, Penicillins, Percocet [Oxycodone-acetaminophen]    Isolation: None   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)   Code Status: CPR   Advance Care Planning Activity    Ht: 182.9 cm (72\")   Wt: 68.5 kg (151 lb 0.2 oz)    Admission Cmt: None   Principal Problem: Hematoma of iliopsoas muscle, left, initial encounter [S70.12XA]                 Active Insurance as of 4/5/2022     Primary Coverage     Payor Plan Insurance Group Employer/Plan Group    ANTHEM BLUE CROSS ANTHEM BLUE CROSS BLUE SHIELD PPO [id number]     Payor Plan Address Payor Plan Phone Number Payor Plan Fax Number Effective Dates    [address] [phone]  1/1/2022 - None Entered    [facility] [id number]       Subscriber Name Subscriber Birth Date Member ID       PATRICIA XIE 7/11/1955 [id number]           Secondary Coverage     Payor Plan Insurance Group Employer/Plan Group    MEDICARE MEDICARE A & B      Payor Plan Address Payor Plan Phone Number Payor Plan Fax Number Effective Dates    [address] [phone]  11/1/2002 - None Entered    [facility] [id number]       Subscriber Name Subscriber Birth Date Member ID       LAURA XIE BRENNAN 1935 " [id number]                 Emergency Contacts      (Rel.) Home Phone Work Phone Mobile Phone    Gladys Sequeira (Spouse) [phone] -- [phone]    SelinayumikoBruce felipe (Son) [phone] -- [phone]            Vital Signs (last day)     Date/Time Temp Temp src Pulse Resp BP Patient Position SpO2    04/08/22 1241 97.9 (36.6) Oral 83 18 137/76 Lying 99    04/08/22 0742 98.2 (36.8) Oral -- 18 131/60 Lying 96    04/08/22 0057 97.8 (36.6) Oral -- 18 118/60 Lying --    04/07/22 1922 98.8 (37.1) Oral 79 16 104/57 Lying --    04/07/22 1629 98.2 (36.8) Oral 89 20 104/54 Lying 100    04/07/22 1345 98.3 (36.8) Oral -- -- -- -- --    04/07/22 1237 -- -- 92 -- -- -- --    04/07/22 1146 -- -- -- 16 104/44 Lying --    04/07/22 1100 -- -- 83 -- -- -- 100    04/07/22 0725 98.3 (36.8) Oral -- 16 133/61 Lying --    04/07/22 0700 -- -- 82 -- -- -- 96          Oxygen Therapy (last day)     Date/Time SpO2 Device (Oxygen Therapy) Flow (L/min) Oxygen Concentration (%) ETCO2 (mmHg)    04/08/22 1241 99 -- -- -- --    04/08/22 0810 -- room air -- -- --    04/08/22 0742 96 -- -- -- --    04/08/22 0448 -- room air -- -- --    04/08/22 0057 -- room air -- -- --    04/07/22 1953 -- room air -- -- --    04/07/22 1922 -- room air -- -- --    04/07/22 1629 100 room air -- -- --    04/07/22 1100 100 room air -- -- --    04/07/22 0903 -- room air -- -- --    04/07/22 0700 96 -- -- -- --          Intake & Output (last day)       04/07 0701 04/08 0700 04/08 0701 04/09 0700    Urine (mL/kg/hr) 450 (0.3)     Total Output 450     Net -450           Urine Unmeasured Occurrence 2 x         Lines, Drains & Airways     Active LDAs     Name Placement date Placement time Site Days    Peripheral IV 04/05/22 2305 Left Hand 04/05/22  2305  Hand  2                  Current Facility-Administered Medications   Medication Dose Route Frequency Provider Last Rate Last Admin   • acetaminophen (TYLENOL) tablet 650 mg  650 mg Oral Q4H PRN Yuri  LIZA Simms   650 mg at 04/07/22 1333    Or   • acetaminophen (TYLENOL) 160 MG/5ML solution 650 mg  650 mg Oral Q4H PRN Carmen Welch APRN        Or   • acetaminophen (TYLENOL) suppository 650 mg  650 mg Rectal Q4H PRN Carmen Welch APRN       • atorvastatin (LIPITOR) tablet 40 mg  40 mg Oral Daily Barbra Patino APRN   40 mg at 04/08/22 0813   • calcium carbonate (TUMS) chewable tablet 500 mg (200 mg elemental)  2 tablet Oral BID PRN Carmen Welch APRN       • dextrose (D50W) (25 g/50 mL) IV injection 25 g  25 g Intravenous Q15 Min PRN Barbra Patino APRN       • dextrose (GLUTOSE) oral gel 15 g  15 g Oral Q15 Min PRN Barbra Patino APRN       • digoxin (LANOXIN) tablet 62.5 mcg  62.5 mcg Oral Daily Barbra Patino APRN   62.5 mcg at 04/08/22 1213   • diphenhydrAMINE (BENADRYL) capsule 25 mg  25 mg Oral BID PRN Barbra Patino APRN       • famotidine (PEPCID) tablet 20 mg  20 mg Oral BID AC Barbra Patino APRN   20 mg at 04/08/22 0813   • ferrous sulfate tablet 325 mg  325 mg Oral Every Other Day Barbra Patino APRN   325 mg at 04/08/22 0813   • glucagon (human recombinant) (GLUCAGEN DIAGNOSTIC) injection 1 mg  1 mg Subcutaneous Q15 Min PRN Barbra Patino APRN       • insulin lispro (ADMELOG) injection 0-9 Units  0-9 Units Subcutaneous TID AC Barbra Patino APRN   4 Units at 04/08/22 1213   • lactobacillus acidophilus (RISAQUAD) capsule 1 capsule  1 capsule Oral Daily Barbra Patino APRN   1 capsule at 04/08/22 0813   • magnesium oxide (MAG-OX) tablet 400 mg  400 mg Oral BID Barbra Patino APRN   400 mg at 04/08/22 0813   • metoprolol succinate XL (TOPROL-XL) 24 hr tablet 12.5 mg  12.5 mg Oral Daily Barbra Patino APRN   12.5 mg at 04/08/22 0813   • ondansetron (ZOFRAN) tablet 4 mg  4 mg Oral Q6H PRN Carmen Welch APRN        Or   • ondansetron (ZOFRAN) injection 4 mg  4 mg  Intravenous Q6H PRN Carmen Welch APRN       • sodium chloride 0.9 % flush 10 mL  10 mL Intravenous PRN Julian Dubose MD       • sodium chloride 0.9 % flush 10 mL  10 mL Intravenous PRN Julian Dubose MD       • sodium chloride 0.9 % flush 10 mL  10 mL Intravenous Q12H Carmen Welch APRN   10 mL at 04/08/22 0813   • sodium chloride 0.9 % flush 10 mL  10 mL Intravenous PRN Carmen Welch APRN       • vancomycin (VANCOCIN) capsule 125 mg  125 mg Oral Every Other Day Barbra Patino APRN   125 mg at 04/08/22 1033         Lab Results (last 48 hours)     Procedure Component Value Units Date/Time    POC Glucose Once [[id number]]  (Abnormal) Collected: 04/08/22 1114    Specimen: Blood Updated: 04/08/22 1115     Glucose 242 mg/dL      Comment: Meter: [id number] : [id number] Allie Darion NA       POC Glucose Once [[id number]]  (Abnormal) Collected: 04/08/22 0757    Specimen: Blood Updated: 04/08/22 0758     Glucose 139 mg/dL      Comment: Meter: [id number] : [id number] Allie Darion NA       Protime-INR [[id number]]  (Abnormal) Collected: 04/08/22 0151    Specimen: Blood Updated: 04/08/22 0223     Protime 15.0 Seconds      INR 1.19    Basic Metabolic Panel [[id number]]  (Abnormal) Collected: 04/08/22 0151    Specimen: Blood Updated: 04/08/22 0221     Glucose 165 mg/dL      BUN 13 mg/dL      Creatinine 1.28 mg/dL      Sodium 139 mmol/L      Potassium 4.2 mmol/L      Chloride 101 mmol/L      CO2 30.0 mmol/L      Calcium 8.0 mg/dL      BUN/Creatinine Ratio 10.2     Anion Gap 8.0 mmol/L      eGFR 55.2 mL/min/1.73      Comment: National Kidney Foundation and American Society of Nephrology (ASN) Task Force recommended calculation based on the Chronic Kidney Disease Epidemiology Collaboration (CKD-EPI) equation refit without adjustment for race.       Narrative:      GFR Normal >60  Chronic Kidney Disease <60  Kidney Failure <15      Phosphorus [[id number]]  (Normal) Collected: 04/08/22 0151     Specimen: Blood Updated: 04/08/22 0221     Phosphorus 3.1 mg/dL     Magnesium [[id number]]  (Normal) Collected: 04/08/22 0151    Specimen: Blood Updated: 04/08/22 0221     Magnesium 2.0 mg/dL     CBC (No Diff) [[id number]]  (Abnormal) Collected: 04/08/22 0151    Specimen: Blood Updated: 04/08/22 0211     WBC 8.37 10*3/mm3      RBC 2.85 10*6/mm3      Hemoglobin 7.4 g/dL      Hematocrit 24.4 %      MCV 85.6 fL      MCH 26.0 pg      MCHC 30.3 g/dL      RDW 16.9 %      RDW-SD 52.3 fl      MPV 11.9 fL      Platelets 238 10*3/mm3     Hemoglobin & Hematocrit, Blood [[id number]]  (Abnormal) Collected: 04/07/22 1613    Specimen: Blood Updated: 04/07/22 1720     Hemoglobin 7.2 g/dL      Hematocrit 23.7 %     POC Glucose Once [[id number]]  (Abnormal) Collected: 04/07/22 1654    Specimen: Blood Updated: 04/07/22 1705     Glucose 141 mg/dL      Comment: Meter: [id number] : [id number] Kade Amezcua MELO       Basic Metabolic Panel [[id number]]  (Abnormal) Collected: 04/07/22 1234    Specimen: Blood Updated: 04/07/22 1317     Glucose 206 mg/dL      BUN 13 mg/dL      Creatinine 1.19 mg/dL      Sodium 138 mmol/L      Potassium 4.4 mmol/L      Chloride 100 mmol/L      CO2 29.2 mmol/L      Calcium 8.5 mg/dL      BUN/Creatinine Ratio 10.9     Anion Gap 8.8 mmol/L      eGFR 60.2 mL/min/1.73      Comment: National Kidney Foundation and American Society of Nephrology (ASN) Task Force recommended calculation based on the Chronic Kidney Disease Epidemiology Collaboration (CKD-EPI) equation refit without adjustment for race.       Narrative:      GFR Normal >60  Chronic Kidney Disease <60  Kidney Failure <15      Phosphorus [[id number]]  (Normal) Collected: 04/07/22 1234    Specimen: Blood Updated: 04/07/22 1317     Phosphorus 2.7 mg/dL     Magnesium [[id number]]  (Normal) Collected: 04/07/22 1234    Specimen: Blood Updated: 04/07/22 1317     Magnesium 2.0 mg/dL     Protime-INR [[id number]]  (Abnormal) Collected: 04/07/22 1234    Specimen:  Blood from Arm, Left Updated: 04/07/22 1310     Protime 15.9 Seconds      INR 1.28    CBC (No Diff) [[id number]]  (Abnormal) Collected: 04/07/22 1234    Specimen: Blood Updated: 04/07/22 1301     WBC 10.81 10*3/mm3      RBC 3.14 10*6/mm3      Hemoglobin 8.1 g/dL      Hematocrit 26.5 %      MCV 84.4 fL      MCH 25.8 pg      MCHC 30.6 g/dL      RDW 17.0 %      RDW-SD 52.4 fl      MPV 12.2 fL      Platelets 270 10*3/mm3     POC Glucose Once [[id number]]  (Abnormal) Collected: 04/07/22 1053    Specimen: Blood Updated: 04/07/22 1053     Glucose 197 mg/dL      Comment: Meter: [id number] : [id number] King Shlomo ALEJO       POC Glucose Once [[id number]]  (Abnormal) Collected: 04/07/22 0558    Specimen: Blood Updated: 04/07/22 0559     Glucose 159 mg/dL      Comment: Meter: [id number] : [id number] Pat ALEJO       Hemoglobin & Hematocrit, Blood [[id number]]  (Abnormal) Collected: 04/06/22 2347    Specimen: Blood Updated: 04/07/22 0052     Hemoglobin 8.2 g/dL      Hematocrit 26.4 %     POC Glucose Once [[id number]]  (Abnormal) Collected: 04/06/22 2101    Specimen: Blood Updated: 04/06/22 2103     Glucose 225 mg/dL      Comment: Meter: [id number] : [id number] Pat ALEJO       Hemoglobin & Hematocrit, Blood [[id number]]  (Abnormal) Collected: 04/06/22 1653    Specimen: Blood Updated: 04/06/22 1701     Hemoglobin 7.4 g/dL      Hematocrit 24.8 %     POC Glucose Once [[id number]]  (Abnormal) Collected: 04/06/22 1645    Specimen: Blood Updated: 04/06/22 1646     Glucose 213 mg/dL      Comment: Meter: [id number] : [id number] Jm ALEJO       [place] Draw [[id number]] Collected: 04/05/22 2304    Specimen: Blood Updated: 04/06/22 1621    Narrative:      The following orders were created for panel order [place] Draw.  Procedure                               Abnormality         Status                     ---------                               -----------         ------                     Qasim Denis  (Gel)[[id number]]                                  Final result               Lavender Top[[id number]]                                     Final result               Gold Top - SST[[id number]]                                                              Light Blue Top[[id number]]                                   Final result                 Please view results for these tests on the individual orders.          Imaging Results (Last 48 Hours)     Procedure Component Value Units Date/Time    CT Lower Extremity Left Without Contrast [[id number]] Collected: 04/08/22 1312     Updated: 04/08/22 1338    Narrative:      CT LEFT THIGH WITHOUT CONTRAST     HISTORY: Left lower extremity hematoma. Spontaneous hemorrhage.     TECHNIQUE: CT includes axial imaging through the left thigh from the  level of the top of femoral head to the distal femoral shaft and this  data was reconstructed in coronal and sagittal planes. No contrast  administered.      COMPARISON: CT left lower extremity without contrast 04/06/2022.     FINDINGS: There is soft tissue edema with thickening involving the  subcutaneous fat extending lateral to the left proximal femur extending  along the left thigh. This is nonspecific and may in part represent  hemorrhage.     The distal left iliopsoas muscle is abnormal and contains areas of mixed  internal hyperdensity consistent with hemorrhage into the left iliopsoas  muscle. This could be associated with an avulsion of the left iliopsoas  tendon. No avulsed lesser trochanteric fragment is evident. There is  mild edema extending between the musculature of the left hip and  proximal thigh. There is calcification associated with the left proximal  rectus femoris muscle associated with partial chronic tear or avulsion.  Atherosclerotic calcifications are present involving the iliac and  femoral vasculature.       Impression:      1. Abnormal distal left iliopsoas muscle with areas of mixed internal  increased density  representing hemorrhage within the muscle and this may  be associated with a left iliopsoas tear or avulsion. This is new when  compared to the previous CT 03/23/2022. (This anatomy not covered on the  previous CT left lower extremity 04/06/2022).  2. Generalized edema within the subcutaneous fat about the left hip and  thigh greatest involving the lateral subcutaneous fat where there is  swelling. No evidence for organized hematoma within the lateral hip or  thigh soft tissues.  3. Old partial tear or avulsion of the left rectus femoris muscle.     Radiation dose reduction techniques were utilized, including automated  exposure control and exposure modulation based on body size.     This report was finalized on 4/8/2022 1:35 PM by Dr. Julian Forrester M.D.           ECG/EMG Results (last 48 hours)     Procedure Component Value Units Date/Time    ECG 12 Lead [[id number]] Collected: 04/05/22 2315     Updated: 04/06/22 1420     QT Interval 391 ms     Narrative:      HEART RATE= 90  bpm  RR Interval= 666  ms  NH Interval=   ms  P Horizontal Axis=   deg  P Front Axis=   deg  QRSD Interval= 126  ms  QT Interval= 391  ms  QRS Axis= -43  deg  T Wave Axis= 90  deg  - ABNORMAL ECG -  Atrial fibrillation  Nonspecific IVCD with LAD  LVH with secondary repolarization abnormality  Anterior Q waves, possibly due to LVH  NO SIGNIFICANT CHANGE FROM PREVIOUS ECG  Electronically Signed By: Ford Berger ([facility]) 06-Apr-2022 14:20:37  Date and Time of Study: 2022-04-05 23:15:46          Orders (last 48 hrs)      Start     Ordered    04/08/22 1411  Inpatient Orthopedic Surgery Consult  Once        Specialty:  Orthopedic Surgery  Provider:  Carter Hoff MD    04/08/22 1415    04/08/22 1403  Inpatient Orthopedic Surgery Consult  Once,   Status:  Canceled        Specialty:  Orthopedic Surgery  Provider:  Ellis Meredith II, MD    04/08/22 1405    04/08/22 1116  POC Glucose Once  PROCEDURE ONCE         04/08/22 1114     04/08/22 0941  CT Lower Extremity Left Without Contrast  1 Time Imaging        Comments: Patient has left lower extremity hematoma.  Evaluating for progression of hematoma, concern for worsening hematoma as hemoglobin trending down.    04/08/22 0943    04/08/22 0759  POC Glucose Once  PROCEDURE ONCE         04/08/22 0757    04/08/22 0600  Protime-INR  Morning Draw         04/07/22 0851    04/07/22 2200  Hemoglobin & Hematocrit, Blood  Once,   Status:  Canceled         04/07/22 1830    04/07/22 1849  If H/H drops on next check at 2200 infuse 1 unit PRBCs  Nursing Communication  Once        Comments: If H/H drops on next check at 2200 infuse 1 unit PRBCs    04/07/22 1849    04/07/22 1833  Verify Informed Consent for Blood Product Administration  Once         04/07/22 1833    04/07/22 1833  Type & Screen  Once         04/07/22 1833    04/07/22 1833  Prepare RBC, 1 Units  Blood - Once         04/07/22 1833    04/07/22 1706  POC Glucose Once  PROCEDURE ONCE         04/07/22 1654    04/07/22 1200  Protime-INR  Once        Comments: Obtain with next H and H      04/07/22 0853    04/07/22 1054  POC Glucose Once  PROCEDURE ONCE         04/07/22 1053    04/07/22 0600  Basic Metabolic Panel  Morning Draw,   Status:  Canceled         04/06/22 1205    04/07/22 0600  Basic Metabolic Panel  Daily       04/06/22 1354    04/07/22 0600  Magnesium  Daily       04/06/22 1354    04/07/22 0600  Phosphorus  Daily       04/06/22 1354    04/07/22 0600  CBC (No Diff)  Daily       04/06/22 1354    04/07/22 0600  POC Glucose Once  PROCEDURE ONCE         04/07/22 0558    04/06/22 2104  POC Glucose Once  PROCEDURE ONCE         04/06/22 2101    04/06/22 1730  famotidine (PEPCID) tablet 20 mg  2 Times Daily Before Meals         04/06/22 1053    04/06/22 1647  POC Glucose Once  PROCEDURE ONCE         04/06/22 1645    04/06/22 1300  atorvastatin (LIPITOR) tablet 40 mg  Daily         04/06/22 1053    04/06/22 1300  ferrous sulfate tablet 325 mg   "Every Other Day         04/06/22 1053    04/06/22 1300  lactobacillus acidophilus (RISAQUAD) capsule 1 capsule  Daily         04/06/22 1053    04/06/22 1300  magnesium oxide (MAG-OX) tablet 400 mg  2 Times Daily         04/06/22 1053    04/06/22 1300  metoprolol succinate XL (TOPROL-XL) 24 hr tablet 12.5 mg  Daily         04/06/22 1053    04/06/22 1200  digoxin (LANOXIN) tablet 62.5 mcg  Daily Digoxin         04/06/22 1053    04/06/22 1100  POC Glucose TID AC  3 Times Daily Before Meals       04/06/22 0924    04/06/22 1055  furosemide (LASIX) tablet 40 mg  Daily,   Status:  Discontinued         04/06/22 1053    04/06/22 1055  vancomycin (VANCOCIN) capsule 125 mg  Every Other Day         04/06/22 1053    04/06/22 1053  diphenhydrAMINE (BENADRYL) capsule 25 mg  2 Times Daily PRN         04/06/22 1053    04/06/22 0926  insulin lispro (ADMELOG) injection 0-9 Units  3 Times Daily Before Meals         04/06/22 0924    04/06/22 0924  dextrose (GLUTOSE) oral gel 15 g  Every 15 Minutes PRN         04/06/22 0924    04/06/22 0924  dextrose (D50W) (25 g/50 mL) IV injection 25 g  Every 15 Minutes PRN         04/06/22 0924    04/06/22 0924  glucagon (human recombinant) (GLUCAGEN DIAGNOSTIC) injection 1 mg  Every 15 Minutes PRN         04/06/22 0924    04/06/22 0900  sodium chloride 0.9 % flush 10 mL  Every 12 Hours Scheduled         04/06/22 0326    04/06/22 0800  Hemoglobin & Hematocrit, Blood  Every 8 Hours,   Status:  Canceled       04/06/22 0326    04/06/22 0400  Vital Signs  Every 4 Hours       04/06/22 0326    04/06/22 0323  calcium carbonate (TUMS) chewable tablet 500 mg (200 mg elemental)  2 Times Daily PRN         04/06/22 0326    04/06/22 0323  ondansetron (ZOFRAN) tablet 4 mg  Every 6 Hours PRN        \"Or\" Linked Group Details    04/06/22 0326    04/06/22 0323  ondansetron (ZOFRAN) injection 4 mg  Every 6 Hours PRN        \"Or\" Linked Group Details    04/06/22 0326    04/06/22 0323  acetaminophen (TYLENOL) tablet 650 mg " " Every 4 Hours PRN        \"Or\" Linked Group Details    22 0326    22 0323  acetaminophen (TYLENOL) 160 MG/5ML solution 650 mg  Every 4 Hours PRN        \"Or\" Linked Group Details    22 0326    22 0323  acetaminophen (TYLENOL) suppository 650 mg  Every 4 Hours PRN        \"Or\" Linked Group Details    22 0326    22 0320  Intake & Output  Every Shift       22 0326    22 0319  sodium chloride 0.9 % flush 10 mL  As Needed         22 225  sodium chloride 0.9 % flush 10 mL  As Needed        \"And\" Linked Group Details    22 225  sodium chloride 0.9 % flush 10 mL  As Needed        \"And\" Linked Group Details    22 225                     Physician Progress Notes (last 48 hours)      Bowen Coughlin MD at 22 1218              Name: Francisco Sequeira ADMIT: 2022   : 1935  PCP: Rubin Hinkle APRN    MRN: [id number] LOS: 2 days   AGE/SEX: 86 y.o. male  ROOM:      Subjective   Subjective   Patient seen this morning.  Continues to complain of left-sided pain primarily with movement.  Minimal pain at rest.  Hemoglobin slightly down, will hold anticoagulation.  Denies chest pain, shortness of breath, fever, chills, nausea, vomiting.      Review of Systems  As above  Objective   Objective   Vital Signs  Temp:  [97.8 °F (36.6 °C)-98.8 °F (37.1 °C)] 98.2 °F (36.8 °C)  Heart Rate:  [79-92] 79  Resp:  [16-20] 18  BP: (104-131)/(54-60) 131/60  SpO2:  [96 %-100 %] 96 %  on   ;   Device (Oxygen Therapy): room air  Body mass index is 21.16 kg/m².  Physical Exam  Mr. Sequeira is a 86 y.o. male former smoker with a history of valvular heart disease, afib, chronic AC, CKD, DM, HTN, NICM, and multiple medical issues who is admitted for lt iliopsoas muscle hematoma on AC      GeneralAlert and oriented x3, no acute distress, chronically ill-appearing.  LungsClear to auscultation bilaterally, no crackles or wheezes  CV " Irregular rate and rhythm, + systolic murmurs   Abdomen Soft, nontender, nondistended.  Normoactive bowel sounds  Extremities Left lower extremity pain limited range of motion secondary to pain.  Able to wiggle his extremity toes.  Intact sensation.  Normal strength right lower extremity.  Trace edema bilateral.    Psych  : Appropriate mood and affect   Results Review     I reviewed the patient's new clinical results.  Results from last 7 days   Lab Units 04/08/22  0151 04/07/22  1613 04/07/22  1234 04/06/22  2347 04/06/22  1653 04/06/22  0951 04/05/22  2304   WBC 10*3/mm3 8.37  --  10.81*  --   --  7.40 7.51   HEMOGLOBIN g/dL 7.4* 7.2* 8.1* 8.2*   < > 7.8*  7.8* 8.6*   PLATELETS 10*3/mm3 238  --  270  --   --  213 242    < > = values in this interval not displayed.     Results from last 7 days   Lab Units 04/08/22  0151 04/07/22  1234 04/06/22  0951 04/06/22  0002   SODIUM mmol/L 139 138 138 138   POTASSIUM mmol/L 4.2 4.4 4.2 4.1   CHLORIDE mmol/L 101 100 101 100   CO2 mmol/L 30.0* 29.2* 32.0* 29.6*   BUN mg/dL 13 13 11 11   CREATININE mg/dL 1.28* 1.19 1.14 1.09   GLUCOSE mg/dL 165* 206* 161* 184*   Estimated Creatinine Clearance: 43 mL/min (A) (by C-G formula based on SCr of 1.28 mg/dL (H)).  Results from last 7 days   Lab Units 04/06/22  0002 04/03/22  0617 04/02/22  0456   ALBUMIN g/dL 2.70* 2.20* 2.40*   BILIRUBIN mg/dL 0.6 0.3 0.3   ALK PHOS U/L 131* 160* 158*   AST (SGOT) U/L 14 18 21   ALT (SGPT) U/L 11 13 11     Results from last 7 days   Lab Units 04/08/22  0151 04/07/22  1234 04/06/22  0951 04/06/22  0002 04/04/22  0711 04/03/22  0617 04/02/22  0456   CALCIUM mg/dL 8.0* 8.5* 8.1* 8.5*   < > 7.7* 7.5*   ALBUMIN g/dL  --   --   --  2.70*  --  2.20* 2.40*   MAGNESIUM mg/dL 2.0 2.0  --   --   --   --   --    PHOSPHORUS mg/dL 3.1 2.7  --   --   --   --   --     < > = values in this interval not displayed.       COVID19   Date Value Ref Range Status   04/06/2022 Not Detected Not Detected - Ref. Range Final    03/28/2022 Not Detected Not Detected - Ref. Range Final     Glucose   Date/Time Value Ref Range Status   04/08/2022 1114 242 (H) 70 - 130 mg/dL Final     Comment:     Meter: [id number] : [id number] Allie Orlando NA   04/08/2022 0757 139 (H) 70 - 130 mg/dL Final     Comment:     Meter: [id number] : [id number] Allie Orlando NA   04/07/2022 1654 141 (H) 70 - 130 mg/dL Final     Comment:     Meter: [id number] : [id number] Kade Amezcua NA   04/07/2022 1053 197 (H) 70 - 130 mg/dL Final     Comment:     Meter: [id number] : [id number] King Shlomo NA   04/07/2022 0558 159 (H) 70 - 130 mg/dL Final     Comment:     Meter: [id number] : [id number] Stewart Magalis NA   04/06/2022 2101 225 (H) 70 - 130 mg/dL Final     Comment:     Meter: [id number] : [id number] Stewart Magalis NA   04/06/2022 1645 213 (H) 70 - 130 mg/dL Final     Comment:     Meter: [id number] : [id number] Jm Michelle NA       Duplex Venous Lower Extremity LEFT  · Normal left lower extremity venous duplex scan.     CT Lumbar Spine Without Contrast  Narrative: CT OF THE LUMBAR SPINE     HISTORY: Left lower extremity weakness     COMPARISON: 03/23/2022     TECHNIQUE: Axial CT imaging was obtained through the lumbar spine.  Coronal and sagittal reformatted images were obtained.     FINDINGS:  No acute fracture or subluxation of the lumbar spine is identified.  Intervertebral disc spaces appear relatively well-maintained. Lumbar  vertebral body alignment appears within normal limits.     T12-L1: There is no canal stenosis or neural foraminal narrowing.  L1-L2: There is broad-based disc bulge. This results in narrowing of the  canal, as well as bilateral neural foraminal narrowing, right greater  than left.  L2-L3: There is broad-based disc bulge. This results in canal narrowing,  as well as neural foraminal narrowing, right greater than left.  L3-L4: Disc bulge results in canal stenosis. There is bilateral neural  foraminal  narrowing.  L4-L5: There is broad-based disc bulge. There is canal stenosis. There  is bilateral neural foraminal narrowing, left greater than right.  L5-S1: There is no canal stenosis or neural foraminal narrowing.        This patient has stranding which is seen posterior to the left psoas  muscle, which is new when compared to prior exam. There is also  asymmetric enlargement of the patient's left iliacus muscle, with  displacement of the left psoas muscle anteriorly. Appearance is  concerning for hematoma, given history of anticoagulation, although this  is incompletely assessed on this exam. Centrally, there is more focal  area of decreased attenuation. This could reflect some liquefying  hematoma, but correlation with any evidence of infection is recommended.  There is additional fluid and stranding which is seen within the left  perivesical space. There is some free fluid within the pelvis within the  right hemipelvis. The patient is noted to have a partially visualized  moderate right pleural effusion. There also is some trace pleural fluid  on the left.     Impression:    1. No acute osseous findings.  2. Degenerative changes in the spine, as noted above.  3. Asymmetric enlargement of the patient's left iliacus and left  iliopsoas muscle, incompletely assessed on this exam. Given history of  anticoagulation, this may represent hemorrhage. There are areas of more  central low attenuation, which could reflect hematoma liquefaction, but  correlation with any evidence of infection is recommended. The  hemorrhage does abut the patient's left psoas muscle as well, and there  is also some stranding and fluid seen within the left perivesical space.     FINDINGS were discussed with Dr. Dubose at 2:40 AM.     Radiation dose reduction techniques were utilized, including automated  exposure control and exposure modulation based on body size.     This report was finalized on 4/6/2022 2:43 AM by Dr. Turner  JUS Frye     CT Lower Extremity Left Without Contrast  Narrative: CT OF THE LEFT LOWER EXTREMITY     HISTORY: Left leg weakness     COMPARISON: 04/05/2022     TECHNIQUE: Axial CT imaging was obtained through the left lower  extremity. Coronal and sagittal reformatted images were obtained.     FINDINGS:  No acute fracture or subluxation of the left knee is seen. There is  diffuse left lower extremity edema. There is a trace amount of patellar  fluid. There is really no significant stranding within the infrapatellar  bursa. Both the patellar and quadriceps tendon appear continuous. Dense  vascular calcifications are noted. The edema appears to be more  significant on the lateral aspect of the leg.     Impression:    1. No acute osseous findings.  2. Both the patellar tendon and quadriceps tendon appear to be intact.  If concern persists for soft tissue injury, consideration for MRI is  suggested.  3. Diffuse left lower extremity edema.     Radiation dose reduction techniques were utilized, including automated  exposure control and exposure modulation based on body size.     This report was finalized on 4/6/2022 2:27 AM by Dr. Yue Frye M.D.     CT Head Without Contrast  Narrative: CT HEAD WITHOUT CONTRAST     HISTORY: Left knee weakness     COMPARISON: 10/23/2021     TECHNIQUE: Axial CT imaging was obtained through the brain. No IV  contrast was administered.     FINDINGS:  Images through the posterior fossa are degraded by streak artifact from  dental amalgam. No acute intracranial hemorrhage is seen. There is  diffuse atrophy. There is periventricular and deep white matter  microangiopathic change. No calvarial fracture is seen. Paranasal  sinuses and mastoid air cells appear clear.     Impression: No acute intracranial findings.     Radiation dose reduction techniques were utilized, including automated  exposure control and exposure modulation based on body size.     This report was finalized on  4/6/2022 2:18 AM by Dr. Yue Frye M.D.       Scheduled Medications  atorvastatin, 40 mg, Oral, Daily  digoxin, 62.5 mcg, Oral, Daily  famotidine, 20 mg, Oral, BID AC  ferrous sulfate, 325 mg, Oral, Every Other Day  insulin lispro, 0-9 Units, Subcutaneous, TID AC  lactobacillus acidophilus, 1 capsule, Oral, Daily  magnesium oxide, 400 mg, Oral, BID  metoprolol succinate XL, 12.5 mg, Oral, Daily  sodium chloride, 10 mL, Intravenous, Q12H  vancomycin, 125 mg, Oral, Every Other Day    Infusions   Diet  Diet Regular; Cardiac, Consistent Carbohydrate      Assessment/Plan     Active Hospital Problems    Diagnosis  POA   • **Hematoma of iliopsoas muscle, left, initial encounter [S70.12XA]  Yes   • History of CVA (cerebrovascular accident) [Z86.73]  Not Applicable   • S/P laparoscopic cholecystectomy [Z90.49]  Not Applicable   • Anemia [D64.9]  Yes   • Chronic anticoagulation [Z79.01]  Not Applicable   • Stage 3b chronic kidney disease (HCC) [N18.32]  Yes   • H/O heart valve replacement with mechanical valve [Z95.2]  Not Applicable   • Type 2 diabetes mellitus (HCC) [E11.9]  Yes   • Hypertension [I10]  Yes   • Atrial fibrillation (HCC) [I48.91]  Yes      Resolved Hospital Problems   No resolved problems to display.       Mr. Sequeira is a 86 y.o. male former smoker with a history of valvular heart disease, afib, chronic AC, CKD, DM, HTN, NICM, and multiple medical issues who is admitted for lt iliopsoas muscle hematoma on AC     Left Iliopsoas hematoma  secondary to anticoagulation.  -CT showed Asymmetric enlargement of the patient's left iliacus and left   iliopsoas muscle, here are areas of more central low attenuation, could reflect hematoma liquefaction,   -Patientwas recently discharged from the hospital after cholecystectomy, aspiration pneumonia.  Was discharged home on warfarin and Lovenox for bridge.   Hemoglobin was 8.6 on admission,   --hold anticoagulation , monitor H and H    -Discontinue aspirin as  well as hemoglobin trending down   -Orthopedic surgery consulted-no interventions indicated.  -Hemoglobin is down today.  -Cardiology consulted. Recommend restarting anticoagulation once stable from medical standpoint.  Signed off.  -Hemoglobin 7.4 this morning.  Down from 8.1 yesterday night.  -If continue to trend down will transfuse blood.  -Ordered repeat CT of left lower extremity to evaluate progression of hematoma as hemoglobin is trending down.  -May resume home warfarin today depending on CT results and hemoglobin levels.        Prior history of CVA therapeutic INR of 2.2.  Patient is on warfarin and aspirin secondary to prior history of CVA: However due to recent GI bleed and now hematoma , patient remains very high risk for recurrent bleeding.  I think risk of continued combination of warfarin and aspirin outweigh the benefits.  Discussed with family we would likely just patient on warfarin only to decrease the risk of recurrent bleeding.    History of mechanical aortic valve/atrial fibrillation/cardiomyopathy  Continue metoprolol, digoxin.    Hold anticoagulation and aspirin secondary to hematoma as above and hemoglobin trended down. Cardiology consulted -signed off        DM type II: Continue sliding scale insulin.  Hypoglycemic protocol.    CKD stage III: Creatinine slightly up .  hold home Lasix.  Recent C. difficile colitis:  Continue vancomycin taper.     Bowen Coughlin MD  [facility]  22  12:18 EDT        Electronically signed by Bowen Coughlin MD at 22 1221     Bowen Coughlin MD at 22 1217          Enter Query Response Below      Query Response:     Left illiopsoas hematoma secondary to anticoagulation              If applicable, please update the problem list.     Patient: Francisco Sequeira        : 1935  Account: [id number]           Admit Date:         Options to Respond to Query:    1. Access the Encounter     a. From  "the To-Do Side bar, click Respond With Note.     b. Click New Note     c. Answer query within the yellow box.                d. Update the Problem List if applicable.         86 year old man admitted with Left Iliopsoas hematoma after recent admission for cholecystectomy and aspiration pneumonia. Had been discharged home on warfarin  and Lovenox for bridge. Cardiology consult notes  &  state: \"Left illiopsoas hematoma secondary to anticoagulation - spontaneous.\"  Treatment: anticoagulation on hold, aspirin discontinued, lab monitoring    Please document if the hematoma can be further specified as:  - Left illiopsoas hematoma secondary to anticoagulation   - hematoma unrelated to anticoagulation  - other, please specify  - unable to determine     By submitting this query, we are merely seeking further clarification of documentation to accurately reflect all conditions that you are monitoring, evaluating, treating or that extend the hospitalization or utilize additional resources of care. Please utilize your independent clinical judgment when addressing the question(s) above.     This query and your response, once completed, will be entered into the legal medical record.    Sincerely,   Jocy Moore RN, BSN  [email or web address]  Clinical Documentation Integrity Program    Electronically signed by Bowen Coughlin MD at 22 1217     Bowen Coughlin MD at 22 1242              Name: Francisco Sequeira ADMIT: 2022   : 1935  PCP: Rubin Hinkle APRN    MRN: [id number] LOS: 1 days   AGE/SEX: 86 y.o. male  ROOM: 48/48     Subjective   Subjective   Patient seen this morning.  Continues to complain of lower extremity pain, pain with movement.  Denies chest pain shortness of breath, fevers, chills.  Hemoglobin stable.    Review of Systems  As above  Objective   Objective   Vital Signs  Temp:  [98 °F (36.7 °C)-98.4 °F (36.9 °C)] 98.3 °F (36.8 °C)  Heart Rate:  [82-92] " 92  Resp:  [14-16] 16  BP: (104-143)/(44-69) 104/44  SpO2:  [96 %-100 %] 100 %  on   ;   Device (Oxygen Therapy): room air  Body mass index is 21.16 kg/m².  Physical Exam  Mr. Sequeira is a 86 y.o. male former smoker with a history of valvular heart disease, afib, chronic AC, CKD, DM, HTN, NICM, and multiple medical issues who is admitted for lt iliopsoas muscle hematoma on AC      GeneralAlert and oriented x3, no acute distress, chronically ill-appearing.  Eyes PERRL, EOMI, anicteric sclera  LungsClear to auscultation bilaterally, no crackles or wheezes  CV Irregular rate and rhythm, + systolic murmurs   Abdomen Soft, nontender, nondistended.  Normoactive bowel sounds  Extremities Left lower extremity pain limited range of motion secondary to pain.  Able to wiggle his extremity toes.  Intact sensation.  Normal strength right lower extremity.  1+ edema bilaterally.    Psych  : Appropriate mood and affect   Results Review     I reviewed the patient's new clinical results.  Results from last 7 days   Lab Units 04/06/22  2347 04/06/22  1653 04/06/22  0951 04/05/22  2304 04/04/22  0711 04/03/22  0617   WBC 10*3/mm3  --   --  7.40 7.51 6.36 5.59   HEMOGLOBIN g/dL 8.2* 7.4* 7.8*  7.8* 8.6* 9.1* 9.1*   PLATELETS 10*3/mm3  --   --  213 242 201 184     Results from last 7 days   Lab Units 04/06/22  0951 04/06/22  0002 04/04/22  0711 04/03/22  0617   SODIUM mmol/L 138 138 139 139   POTASSIUM mmol/L 4.2 4.1 4.3 4.3   CHLORIDE mmol/L 101 100 107 108*   CO2 mmol/L 32.0* 29.6* 26.7 26.0   BUN mg/dL 11 11 10 12   CREATININE mg/dL 1.14 1.09 0.96 0.99   GLUCOSE mg/dL 161* 184* 143* 147*   Estimated Creatinine Clearance: 48.3 mL/min (by C-G formula based on SCr of 1.14 mg/dL).  Results from last 7 days   Lab Units 04/06/22  0002 04/03/22  0617 04/02/22  0456 04/01/22  0502   ALBUMIN g/dL 2.70* 2.20* 2.40* 1.90*   BILIRUBIN mg/dL 0.6 0.3 0.3 0.2   ALK PHOS U/L 131* 160* 158* 151*   AST (SGOT) U/L 14 18 21 30   ALT (SGPT) U/L 11 13 11  12     Results from last 7 days   Lab Units 04/06/22  0951 04/06/22  0002 04/04/22  0711 04/03/22  0617 04/02/22  0456 04/01/22  0502   CALCIUM mg/dL 8.1* 8.5* 8.2* 7.7* 7.5* 7.4*   ALBUMIN g/dL  --  2.70*  --  2.20* 2.40* 1.90*       COVID19   Date Value Ref Range Status   04/06/2022 Not Detected Not Detected - Ref. Range Final   03/28/2022 Not Detected Not Detected - Ref. Range Final     Glucose   Date/Time Value Ref Range Status   04/07/2022 1053 197 (H) 70 - 130 mg/dL Final     Comment:     Meter: [id number] : [id number] King Sarahn NA   04/07/2022 0558 159 (H) 70 - 130 mg/dL Final     Comment:     Meter: [id number] : [id number] Stewartabigail Blancas NA   04/06/2022 2101 225 (H) 70 - 130 mg/dL Final     Comment:     Meter: [id number] : [id number] Pat Blancas NA   04/06/2022 1645 213 (H) 70 - 130 mg/dL Final     Comment:     Meter: [id number] : [id number] Jm ALEJO   04/06/2022 1212 148 (H) 70 - 130 mg/dL Final     Comment:     Meter: [id number] : [id number] Jm ALEJO   04/06/2022 0944 162 (H) 70 - 130 mg/dL Final     Comment:     Meter: [id number] : [id number] Jm ALEJO       Duplex Venous Lower Extremity LEFT  · Normal left lower extremity venous duplex scan.     CT Lumbar Spine Without Contrast  Narrative: CT OF THE LUMBAR SPINE     HISTORY: Left lower extremity weakness     COMPARISON: 03/23/2022     TECHNIQUE: Axial CT imaging was obtained through the lumbar spine.  Coronal and sagittal reformatted images were obtained.     FINDINGS:  No acute fracture or subluxation of the lumbar spine is identified.  Intervertebral disc spaces appear relatively well-maintained. Lumbar  vertebral body alignment appears within normal limits.     T12-L1: There is no canal stenosis or neural foraminal narrowing.  L1-L2: There is broad-based disc bulge. This results in narrowing of the  canal, as well as bilateral neural foraminal narrowing, right greater  than left.  L2-L3: There is  broad-based disc bulge. This results in canal narrowing,  as well as neural foraminal narrowing, right greater than left.  L3-L4: Disc bulge results in canal stenosis. There is bilateral neural  foraminal narrowing.  L4-L5: There is broad-based disc bulge. There is canal stenosis. There  is bilateral neural foraminal narrowing, left greater than right.  L5-S1: There is no canal stenosis or neural foraminal narrowing.        This patient has stranding which is seen posterior to the left psoas  muscle, which is new when compared to prior exam. There is also  asymmetric enlargement of the patient's left iliacus muscle, with  displacement of the left psoas muscle anteriorly. Appearance is  concerning for hematoma, given history of anticoagulation, although this  is incompletely assessed on this exam. Centrally, there is more focal  area of decreased attenuation. This could reflect some liquefying  hematoma, but correlation with any evidence of infection is recommended.  There is additional fluid and stranding which is seen within the left  perivesical space. There is some free fluid within the pelvis within the  right hemipelvis. The patient is noted to have a partially visualized  moderate right pleural effusion. There also is some trace pleural fluid  on the left.     Impression:    1. No acute osseous findings.  2. Degenerative changes in the spine, as noted above.  3. Asymmetric enlargement of the patient's left iliacus and left  iliopsoas muscle, incompletely assessed on this exam. Given history of  anticoagulation, this may represent hemorrhage. There are areas of more  central low attenuation, which could reflect hematoma liquefaction, but  correlation with any evidence of infection is recommended. The  hemorrhage does abut the patient's left psoas muscle as well, and there  is also some stranding and fluid seen within the left perivesical space.     FINDINGS were discussed with Dr. Dubose at 2:40 AM.      Radiation dose reduction techniques were utilized, including automated  exposure control and exposure modulation based on body size.     This report was finalized on 4/6/2022 2:43 AM by Dr. Yue Frye M.D.     CT Lower Extremity Left Without Contrast  Narrative: CT OF THE LEFT LOWER EXTREMITY     HISTORY: Left leg weakness     COMPARISON: 04/05/2022     TECHNIQUE: Axial CT imaging was obtained through the left lower  extremity. Coronal and sagittal reformatted images were obtained.     FINDINGS:  No acute fracture or subluxation of the left knee is seen. There is  diffuse left lower extremity edema. There is a trace amount of patellar  fluid. There is really no significant stranding within the infrapatellar  bursa. Both the patellar and quadriceps tendon appear continuous. Dense  vascular calcifications are noted. The edema appears to be more  significant on the lateral aspect of the leg.     Impression:    1. No acute osseous findings.  2. Both the patellar tendon and quadriceps tendon appear to be intact.  If concern persists for soft tissue injury, consideration for MRI is  suggested.  3. Diffuse left lower extremity edema.     Radiation dose reduction techniques were utilized, including automated  exposure control and exposure modulation based on body size.     This report was finalized on 4/6/2022 2:27 AM by Dr. Yue Frye M.D.     CT Head Without Contrast  Narrative: CT HEAD WITHOUT CONTRAST     HISTORY: Left knee weakness     COMPARISON: 10/23/2021     TECHNIQUE: Axial CT imaging was obtained through the brain. No IV  contrast was administered.     FINDINGS:  Images through the posterior fossa are degraded by streak artifact from  dental amalgam. No acute intracranial hemorrhage is seen. There is  diffuse atrophy. There is periventricular and deep white matter  microangiopathic change. No calvarial fracture is seen. Paranasal  sinuses and mastoid air cells appear clear.     Impression: No  acute intracranial findings.     Radiation dose reduction techniques were utilized, including automated  exposure control and exposure modulation based on body size.     This report was finalized on 4/6/2022 2:18 AM by Dr. Yue Frye M.D.       Scheduled Medications  atorvastatin, 40 mg, Oral, Daily  digoxin, 62.5 mcg, Oral, Daily  famotidine, 20 mg, Oral, BID AC  ferrous sulfate, 325 mg, Oral, Every Other Day  furosemide, 40 mg, Oral, Daily  insulin lispro, 0-9 Units, Subcutaneous, TID AC  lactobacillus acidophilus, 1 capsule, Oral, Daily  magnesium oxide, 400 mg, Oral, BID  metoprolol succinate XL, 12.5 mg, Oral, Daily  sodium chloride, 10 mL, Intravenous, Q12H  vancomycin, 125 mg, Oral, Every Other Day    Infusions   Diet  Diet Regular; Cardiac, Consistent Carbohydrate      Assessment/Plan     Active Hospital Problems    Diagnosis  POA   • **Hematoma of iliopsoas muscle, left, initial encounter [S70.12XA]  Yes   • History of CVA (cerebrovascular accident) [Z86.73]  Not Applicable   • S/P laparoscopic cholecystectomy [Z90.49]  Not Applicable   • Anemia [D64.9]  Yes   • Chronic anticoagulation [Z79.01]  Not Applicable   • Stage 3b chronic kidney disease (HCC) [N18.32]  Yes   • H/O heart valve replacement with mechanical valve [Z95.2]  Not Applicable   • Type 2 diabetes mellitus (HCC) [E11.9]  Yes   • Hypertension [I10]  Yes   • Atrial fibrillation (HCC) [I48.91]  Yes      Resolved Hospital Problems   No resolved problems to display.       Mr. Sequeira is a 86 y.o. male former smoker with a history of valvular heart disease, afib, chronic AC, CKD, DM, HTN, NICM, and multiple medical issues who is admitted for lt iliopsoas muscle hematoma on AC     Left Iliopsoas hematoma   -CT showed Asymmetric enlargement of the patient's left iliacus and left   iliopsoas muscle, here are areas of more central low attenuation, could reflect hematoma liquefaction,   -Patientwas recently discharged from the hospital after  cholecystectomy, aspiration pneumonia.  Was discharged home on warfarin and Lovenox for bridge.   Hemoglobin was 8.6 on admission,   --hold anticoagulation , monitor H and H    -Discontinue aspirin as well as hemoglobin trending down   -Orthopedic surgery consulted-no interventions indicated.  -Hemoglobin is down today.  -Cardiology consulted. Recommend restarting anticoagulation once stable from medical standpoint.  Signed off.  -Hemoglobin slightly down today but stable.  Last one was 8.2.  Continue to monitor.  -Continue to monitor hemoglobin.  If hemoglobin stable may resume warfarin tomorrow      Prior history of CVA therapeutic INR of 2.2.  Patient is on warfarin and aspirin secondary to prior history of CVA: However due to recent GI bleed and now hematoma , patient remains very high risk for recurrent bleeding.  I think risk of continued combination of warfarin and aspirin outweigh the benefits.  Discussed with family we would likely just patient on warfarin only to decrease the risk of recurrent bleeding.    History of mechanical aortic valve/atrial fibrillation:   Continue metoprolol, digoxin.    Hold anticoagulation and aspirin secondary to hematoma as above and hemoglobin trended down.  Cardiology consulted -signed off    DM type II: Continue sliding scale insulin.  Hypoglycemic protocol.      Recent C. difficile colitis:  Continue vancomycin taper.     Bowen Coughlin MD  [facility]  22  12:42 EDT        Electronically signed by Bowen Coughlin MD at 22 1359     Jose Farrar III, MD at 22 1049              Patient Name: Francisco Sequeira  :1935  86 y.o.      Patient Care Team:  Rubin Hinkle APRN as PCP - General (Family Medicine)  Audra Vazquez RPH as Pharmacist  Vasquez Niño, PharmD as Pharmacist (Pharmacy)  Tatiana Tinoco MD as Consulting Physician (Gastroenterology)    Chief Complaint: hematoma    Interval History: no CP       Objective  "  Vital Signs  Temp:  [97.5 °F (36.4 °C)-98.4 °F (36.9 °C)] 98.3 °F (36.8 °C)  Heart Rate:  [78-84] 82  Resp:  [14-16] 16  BP: (112-143)/(58-69) 133/61    Intake/Output Summary (Last 24 hours) at 4/7/2022 1050  Last data filed at 4/6/2022 1522  Gross per 24 hour   Intake --   Output 300 ml   Net -300 ml     Flowsheet Rows    Flowsheet Row First Filed Value   Admission Height 182.9 cm (72\") Documented at 04/06/2022 0047   Admission Weight 70.8 kg (156 lb) Documented at 04/06/2022 0047          Physical Exam:   General Appearance:    Alert, cooperative, in no acute distress   Lungs:     Clear to auscultation.  Normal respiratory effort and rate.      Heart:    irregular rhythm and normal rate, normal S1 and S2, no murmurs, gallops or rubs.  Crisp clicks   Chest Wall:    No abnormalities observed   Abdomen:     Soft, nontender, positive bowel sounds.     Extremities:   no cyanosis, clubbing or edema.  No marked joint deformities.        Results Review:    Results from last 7 days   Lab Units 04/06/22  0951   SODIUM mmol/L 138   POTASSIUM mmol/L 4.2   CHLORIDE mmol/L 101   CO2 mmol/L 32.0*   BUN mg/dL 11   CREATININE mg/dL 1.14   GLUCOSE mg/dL 161*   CALCIUM mg/dL 8.1*     Results from last 7 days   Lab Units 04/06/22  0002   TROPONIN T ng/mL 0.030     Results from last 7 days   Lab Units 04/06/22  2347 04/06/22  1653 04/06/22  0951   WBC 10*3/mm3  --   --  7.40   HEMOGLOBIN g/dL 8.2*   < > 7.8*  7.8*   HEMATOCRIT % 26.4*   < > 25.4*  25.4*   PLATELETS 10*3/mm3  --   --  213    < > = values in this interval not displayed.     Results from last 7 days   Lab Units 04/05/22  2304 04/05/22  0000 04/04/22  0711   INR  1.20* 1.30 1.10                Medication Review:   atorvastatin, 40 mg, Oral, Daily  digoxin, 62.5 mcg, Oral, Daily  famotidine, 20 mg, Oral, BID AC  ferrous sulfate, 325 mg, Oral, Every Other Day  furosemide, 40 mg, Oral, Daily  insulin lispro, 0-9 Units, Subcutaneous, TID AC  lactobacillus acidophilus, 1 " capsule, Oral, Daily  magnesium oxide, 400 mg, Oral, BID  metoprolol succinate XL, 12.5 mg, Oral, Daily  sodium chloride, 10 mL, Intravenous, Q12H  vancomycin, 125 mg, Oral, Every Other Day         Assessment/Plan   Active Hospital Problems    Diagnosis  POA   • **Hematoma of iliopsoas muscle, left, initial encounter [S70.12XA]  Yes   • History of CVA (cerebrovascular accident) [Z86.73]  Not Applicable   • S/P laparoscopic cholecystectomy [Z90.49]  Not Applicable   • Anemia [D64.9]  Yes   • Chronic anticoagulation [Z79.01]  Not Applicable   • Stage 3b chronic kidney disease (HCC) [N18.32]  Yes   • H/O heart valve replacement with mechanical valve [Z95.2]  Not Applicable   • Type 2 diabetes mellitus (HCC) [E11.9]  Yes   • Hypertension [I10]  Yes   • Atrial fibrillation (HCC) [I48.91]  Yes      Resolved Hospital Problems   No resolved problems to display.     1. Left illiopsoas hematoma secondary to anticoagualtion - spontaneous. No reported trauma. Ortho saw. rec heat/ice. No surgical indication  2. Mechanical aortic valve-patient has a class I indication for resumption of anticoagulation once appropriate from a medical standpoint  3. Permanent atrial fibrillation, rate control on metoprolol and digoxin  4. Prior stroke with INR 2.2  5. Recent acute cholecystitis status post lap teja 3/2/22  6. Chronic anticoagulation with warfarin. Followed by med management clinic.  7. 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 Chronic diastolic heart failure  9. Pleural effusion , repeat thoracentesis x 3 (10/25/21, 11/11/21, 3/30/22)     As above, would resume chronic anticoagulation once appropriate from medical standpoint, stable cardiac status with no active cardiac issues, we will sign off, please call if we can help in any way.    Jose Farrar III, MD  [facility]  04/07/22  10:50 EDT      Electronically signed by Jose Farrar III, MD at 04/07/22 9963

## 2022-04-08 NOTE — PROGRESS NOTES
Name: Francisco Sequeira ADMIT: 2022   : 1935  PCP: Rubin Hinkle APRN    MRN: [id number] LOS: 2 days   AGE/SEX: 86 y.o. male  ROOM: 48/48     Subjective   Subjective   Patient seen this morning.  Continues to complain of left-sided pain primarily with movement.  Minimal pain at rest.  Hemoglobin slightly down, will hold anticoagulation.  Denies chest pain, shortness of breath, fever, chills, nausea, vomiting.      Review of Systems   As above  Objective   Objective   Vital Signs  Temp:  [97.8 °F (36.6 °C)-98.8 °F (37.1 °C)] 98.2 °F (36.8 °C)  Heart Rate:  [79-92] 79  Resp:  [16-20] 18  BP: (104-131)/(54-60) 131/60  SpO2:  [96 %-100 %] 96 %  on   ;   Device (Oxygen Therapy): room air  Body mass index is 21.16 kg/m².  Physical Exam  Mr. Sequeira is a 86 y.o. male former smoker with a history of valvular heart disease, afib, chronic AC, CKD, DM, HTN, NICM, and multiple medical issues who is admitted for lt iliopsoas muscle hematoma on AC      GeneralAlert and oriented x3, no acute distress, chronically ill-appearing.  LungsClear to auscultation bilaterally, no crackles or wheezes  CV Irregular rate and rhythm, + systolic murmurs   Abdomen Soft, nontender, nondistended.  Normoactive bowel sounds  Extremities Left lower extremity pain limited range motion, unable to raise  his left lower extremity.  Able to wiggle his extremity toes.  .  Normal strength right lower extremity.  Trace edema bilateral.    Psych  : Appropriate mood and affect   Results Review     I reviewed the patient's new clinical results.  Results from last 7 days   Lab Units 22  0151 22  1613 22  1234 22  2347 22  1653 22  0951 22  2304   WBC 10*3/mm3 8.37  --  10.81*  --   --  7.40 7.51   HEMOGLOBIN g/dL 7.4* 7.2* 8.1* 8.2*   < > 7.8*  7.8* 8.6*   PLATELETS 10*3/mm3 238  --  270  --   --  213 242    < > = values in this interval not displayed.     Results from last 7 days   Lab Units  04/08/22  0151 04/07/22  1234 04/06/22  0951 04/06/22  0002   SODIUM mmol/L 139 138 138 138   POTASSIUM mmol/L 4.2 4.4 4.2 4.1   CHLORIDE mmol/L 101 100 101 100   CO2 mmol/L 30.0* 29.2* 32.0* 29.6*   BUN mg/dL 13 13 11 11   CREATININE mg/dL 1.28* 1.19 1.14 1.09   GLUCOSE mg/dL 165* 206* 161* 184*   Estimated Creatinine Clearance: 43 mL/min (A) (by C-G formula based on SCr of 1.28 mg/dL (H)).  Results from last 7 days   Lab Units 04/06/22  0002 04/03/22  0617 04/02/22  0456   ALBUMIN g/dL 2.70* 2.20* 2.40*   BILIRUBIN mg/dL 0.6 0.3 0.3   ALK PHOS U/L 131* 160* 158*   AST (SGOT) U/L 14 18 21   ALT (SGPT) U/L 11 13 11     Results from last 7 days   Lab Units 04/08/22  0151 04/07/22  1234 04/06/22  0951 04/06/22  0002 04/04/22  0711 04/03/22  0617 04/02/22  0456   CALCIUM mg/dL 8.0* 8.5* 8.1* 8.5*   < > 7.7* 7.5*   ALBUMIN g/dL  --   --   --  2.70*  --  2.20* 2.40*   MAGNESIUM mg/dL 2.0 2.0  --   --   --   --   --    PHOSPHORUS mg/dL 3.1 2.7  --   --   --   --   --     < > = values in this interval not displayed.       COVID19   Date Value Ref Range Status   04/06/2022 Not Detected Not Detected - Ref. Range Final   03/28/2022 Not Detected Not Detected - Ref. Range Final     Glucose   Date/Time Value Ref Range Status   04/08/2022 1114 242 (H) 70 - 130 mg/dL Final     Comment:     Meter: [id number] : [id number] Allie Orlando NA   04/08/2022 0757 139 (H) 70 - 130 mg/dL Final     Comment:     Meter: [id number] : [id number] Allie Orlando NA   04/07/2022 1654 141 (H) 70 - 130 mg/dL Final     Comment:     Meter: [id number] : [id number] Kade Ngoie NA   04/07/2022 1053 197 (H) 70 - 130 mg/dL Final     Comment:     Meter: [id number] : [id number] Silvestre Satin NA   04/07/2022 0558 159 (H) 70 - 130 mg/dL Final     Comment:     Meter: [id number] : [id number] Stewart Magalis NA   04/06/2022 2101 225 (H) 70 - 130 mg/dL Final     Comment:     Meter: [id number] : [id number] Stewart Magalis    04/06/2022  1645 213 (H) 70 - 130 mg/dL Final     Comment:     Meter: [id number] : [id number] Jm ALEJO       Duplex Venous Lower Extremity LEFT  · Normal left lower extremity venous duplex scan.     CT Lumbar Spine Without Contrast  Narrative: CT OF THE LUMBAR SPINE     HISTORY: Left lower extremity weakness     COMPARISON: 03/23/2022     TECHNIQUE: Axial CT imaging was obtained through the lumbar spine.  Coronal and sagittal reformatted images were obtained.     FINDINGS:  No acute fracture or subluxation of the lumbar spine is identified.  Intervertebral disc spaces appear relatively well-maintained. Lumbar  vertebral body alignment appears within normal limits.     T12-L1: There is no canal stenosis or neural foraminal narrowing.  L1-L2: There is broad-based disc bulge. This results in narrowing of the  canal, as well as bilateral neural foraminal narrowing, right greater  than left.  L2-L3: There is broad-based disc bulge. This results in canal narrowing,  as well as neural foraminal narrowing, right greater than left.  L3-L4: Disc bulge results in canal stenosis. There is bilateral neural  foraminal narrowing.  L4-L5: There is broad-based disc bulge. There is canal stenosis. There  is bilateral neural foraminal narrowing, left greater than right.  L5-S1: There is no canal stenosis or neural foraminal narrowing.        This patient has stranding which is seen posterior to the left psoas  muscle, which is new when compared to prior exam. There is also  asymmetric enlargement of the patient's left iliacus muscle, with  displacement of the left psoas muscle anteriorly. Appearance is  concerning for hematoma, given history of anticoagulation, although this  is incompletely assessed on this exam. Centrally, there is more focal  area of decreased attenuation. This could reflect some liquefying  hematoma, but correlation with any evidence of infection is recommended.  There is additional fluid and stranding which is  seen within the left  perivesical space. There is some free fluid within the pelvis within the  right hemipelvis. The patient is noted to have a partially visualized  moderate right pleural effusion. There also is some trace pleural fluid  on the left.     Impression:    1. No acute osseous findings.  2. Degenerative changes in the spine, as noted above.  3. Asymmetric enlargement of the patient's left iliacus and left  iliopsoas muscle, incompletely assessed on this exam. Given history of  anticoagulation, this may represent hemorrhage. There are areas of more  central low attenuation, which could reflect hematoma liquefaction, but  correlation with any evidence of infection is recommended. The  hemorrhage does abut the patient's left psoas muscle as well, and there  is also some stranding and fluid seen within the left perivesical space.     FINDINGS were discussed with Dr. Dubose at 2:40 AM.     Radiation dose reduction techniques were utilized, including automated  exposure control and exposure modulation based on body size.     This report was finalized on 4/6/2022 2:43 AM by Dr. Yue Frye M.D.     CT Lower Extremity Left Without Contrast  Narrative: CT OF THE LEFT LOWER EXTREMITY     HISTORY: Left leg weakness     COMPARISON: 04/05/2022     TECHNIQUE: Axial CT imaging was obtained through the left lower  extremity. Coronal and sagittal reformatted images were obtained.     FINDINGS:  No acute fracture or subluxation of the left knee is seen. There is  diffuse left lower extremity edema. There is a trace amount of patellar  fluid. There is really no significant stranding within the infrapatellar  bursa. Both the patellar and quadriceps tendon appear continuous. Dense  vascular calcifications are noted. The edema appears to be more  significant on the lateral aspect of the leg.     Impression:    1. No acute osseous findings.  2. Both the patellar tendon and quadriceps tendon appear to be intact.  If  concern persists for soft tissue injury, consideration for MRI is  suggested.  3. Diffuse left lower extremity edema.     Radiation dose reduction techniques were utilized, including automated  exposure control and exposure modulation based on body size.     This report was finalized on 4/6/2022 2:27 AM by Dr. Yue Frye M.D.     CT Head Without Contrast  Narrative: CT HEAD WITHOUT CONTRAST     HISTORY: Left knee weakness     COMPARISON: 10/23/2021     TECHNIQUE: Axial CT imaging was obtained through the brain. No IV  contrast was administered.     FINDINGS:  Images through the posterior fossa are degraded by streak artifact from  dental amalgam. No acute intracranial hemorrhage is seen. There is  diffuse atrophy. There is periventricular and deep white matter  microangiopathic change. No calvarial fracture is seen. Paranasal  sinuses and mastoid air cells appear clear.     Impression: No acute intracranial findings.     Radiation dose reduction techniques were utilized, including automated  exposure control and exposure modulation based on body size.     This report was finalized on 4/6/2022 2:18 AM by Dr. Yue Frye M.D.       Scheduled Medications  atorvastatin, 40 mg, Oral, Daily  digoxin, 62.5 mcg, Oral, Daily  famotidine, 20 mg, Oral, BID AC  ferrous sulfate, 325 mg, Oral, Every Other Day  insulin lispro, 0-9 Units, Subcutaneous, TID AC  lactobacillus acidophilus, 1 capsule, Oral, Daily  magnesium oxide, 400 mg, Oral, BID  metoprolol succinate XL, 12.5 mg, Oral, Daily  sodium chloride, 10 mL, Intravenous, Q12H  vancomycin, 125 mg, Oral, Every Other Day    Infusions   Diet  Diet Regular; Cardiac, Consistent Carbohydrate       Assessment/Plan     Active Hospital Problems    Diagnosis  POA   • **Hematoma of iliopsoas muscle, left, initial encounter [S70.12XA]  Yes   • History of CVA (cerebrovascular accident) [Z86.73]  Not Applicable   • S/P laparoscopic cholecystectomy [Z90.49]  Not Applicable    • Anemia [D64.9]  Yes   • Chronic anticoagulation [Z79.01]  Not Applicable   • Stage 3b chronic kidney disease (HCC) [N18.32]  Yes   • H/O heart valve replacement with mechanical valve [Z95.2]  Not Applicable   • Type 2 diabetes mellitus (HCC) [E11.9]  Yes   • Hypertension [I10]  Yes   • Atrial fibrillation (HCC) [I48.91]  Yes      Resolved Hospital Problems   No resolved problems to display.       Mr. Sequeira is a 86 y.o. male former smoker with a history of valvular heart disease, afib, chronic AC, CKD, DM, HTN, NICM, and multiple medical issues who is admitted for lt iliopsoas muscle hematoma on AC     Left Iliopsoas hematoma  secondary to anticoagulation.  -CT showed Asymmetric enlargement of the patient's left iliacus and left   iliopsoas muscle, here are areas of more central low attenuation, could reflect hematoma liquefaction,   -Patientwas recently discharged from the hospital after cholecystectomy, aspiration pneumonia.  Was discharged home on warfarin and Lovenox for bridge.   Hemoglobin was 8.6 on admission,   --hold anticoagulation , monitor H and H    -Discontinue aspirin as well as hemoglobin trending down   -Orthopedic surgery consulted-no interventions indicated.  -Hemoglobin is down today.  -Cardiology consulted. Recommend restarting anticoagulation once stable from medical standpoint.  Signed off.  -Hemoglobin 7.4 this morning.  Down from 8.1 yesterday night.  -If continue to trend down will transfuse blood.  -Ordered repeat CT of left lower extremity to evaluate progression of hematoma as hemoglobin is trending down.  -May resume home warfarin today depending on CT results and hemoglobin levels.        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  Ortho reconsulted.    Prior history of CVA therapeutic INR of 2.2.  Patient is on warfarin and aspirin secondary to prior history of CVA: However due to recent GI bleed and now hematoma , patient  remains very high risk for recurrent bleeding.  I think risk of continued combination of warfarin and aspirin outweigh the benefits.  Discussed with family we would likely just patient on warfarin only to decrease the risk of recurrent bleeding.    History of mechanical aortic valve/atrial fibrillation/cardiomyopathy  Continue metoprolol, digoxin.    Hold anticoagulation and aspirin secondary to hematoma as above and hemoglobin trended down. Cardiology consulted -signed off        DM type II: Continue sliding scale insulin.  Hypoglycemic protocol.   CKD stage III: Creatinine slightly up 04/08.  hold home Lasix.  Recent C. difficile colitis:  Continue vancomycin taper.         Bowen Coughlin MD  [facility]  04/08/22  12:18 EDT     Addendum 04/08 1917  Discussed with orthopedic surgery.  Now with the femoral nerve palsy secondary to hematoma and muscle tear seen on imaging, patient might require intervention, however remains very high risk for surgical intervention.  Plan to consult IR.  Will continue to hold anticoagulation for now.

## 2022-04-08 NOTE — PLAN OF CARE
Goal Outcome Evaluation:  Problem: Fall Injury Risk  Goal: Absence of Fall and Fall-Related Injury  Outcome: Ongoing, Progressing  Intervention: Identify and Manage Contributors  Recent Flowsheet Documentation  Taken 4/8/2022 1600 by Ren Suarez RN  Medication Review/Management: medications reviewed  Taken 4/8/2022 1400 by Ren Suarez RN  Medication Review/Management: medications reviewed  Intervention: Promote Injury-Free Environment  Recent Flowsheet Documentation  Taken 4/8/2022 1600 by Ren Suarez RN  Safety Promotion/Fall Prevention:   assistive device/personal items within reach   fall prevention program maintained   lighting adjusted   mobility aid in reach   nonskid shoes/slippers when out of bed   room organization consistent   safety round/check completed  Taken 4/8/2022 1400 by Ren Suarez RN  Safety Promotion/Fall Prevention:   assistive device/personal items within reach   clutter free environment maintained   fall prevention program maintained   mobility aid in reach   lighting adjusted   nonskid shoes/slippers when out of bed   room organization consistent   safety round/check completed   activity supervised     Problem: Pain Acute  Goal: Acceptable Pain Control and Functional Ability  Outcome: Ongoing, Progressing  Intervention: Prevent or Manage Pain  Recent Flowsheet Documentation  Taken 4/8/2022 1600 by Ren Suarez RN  Medication Review/Management: medications reviewed  Taken 4/8/2022 1400 by Ren Suarez RN  Medication Review/Management: medications reviewed     Problem: Skin Injury Risk Increased  Goal: Skin Health and Integrity  Outcome: Ongoing, Progressing         Pt. Remains A&Ox4 with VSS on monitor and RA. Pt. Is an assistx2 for standing. Bedpan and urinal for elimination. Watching H&H. Pt. Seen by Ortho today with Procedure potentially tomorrow, see provider note. Pt. Does not c/o pain at rest, but prefers tylenol to help with pain when/if it arises. Will continue to  monitor.

## 2022-04-08 NOTE — PLAN OF CARE
Goal Outcome Evaluation:      Pt appears to rest well this shift, pt states he is in less pain than yesterday, pt to receive blood if hmg dropped, h&h drawn and was found to be higher than previous amount, pt did not receive blood due to increase

## 2022-04-08 NOTE — CASE MANAGEMENT/SOCIAL WORK
Discharge Planning Assessment  [facility]     Patient Name: Francisco Sequeira  MRN: [id number]  Today's Date: 4/8/2022    Admit Date: 4/5/2022     Discharge Needs Assessment     Row Name 04/08/22 1021       Living Environment    People in Home spouse    Current Living Arrangements home    Primary Care Provided by self    Provides Primary Care For no one    Family Caregiver if Needed spouse    Family Caregiver Names wife assists w/ care    Quality of Family Relationships supportive    Able to Return to Prior Arrangements --  Current w/ [facility]  Nursing and PT- prefers to return home w/ HH, but may need short-term rehab, depending on progress. Wife given RTR       Resource/Environmental Concerns    Resource/Environmental Concerns none       Transition Planning    Patient/Family Anticipated Services at Transition home health care    Transportation Anticipated family or friend will provide       Discharge Needs Assessment    Equipment Currently Used at Home walker, standard    Concerns to be Addressed discharge planning    Equipment Needed After Discharge none    Provided Post Acute Provider List? Yes    Post Acute Provider List Inpatient Rehab    Delivered To Support Person    Support Person wife given RTR    Method of Delivery In person               Discharge Plan     Row Name 04/08/22 1023       Plan    Plan Introduced self and explained role of CCP. PPE used. Info on facesheet verified    Provided Post Acute Provider List? Yes    Post Acute Provider List Inpatient Rehab    Delivered To Support Person    Support Person wife    Method of Delivery In person    Plan Comments Lives at home w/ wife. Current w/ [facility]  Nursing and PT. Uses cane. Wife assists w/ care. Would like to return home w/ HH at d/c, but may need short-term rehab, depending on progress. Gave/explained RTR to wife. CCP to follow              Continued Care and Services - Admitted Since 4/5/2022    Coordination has not been started for this  encounter.     Selected Continued Care - Prior Encounters Includes selections from prior encounters from 1/5/2022 to 4/8/2022    Discharged on 3/2/2022 Admission date: 2/14/2022 - Discharge disposition: Home-Health Care Svc    Home Medical Care     Service Provider Selected Services Address Phone Fax Patient Preferred    [facility]  [facility] [address] [address] [phone] --                       Demographic Summary    No documentation.                Functional Status    No documentation.                Psychosocial    No documentation.                Abuse/Neglect    No documentation.                Legal    No documentation.                Substance Abuse    No documentation.                Patient Forms    No documentation.                   Honey Clark RN

## 2022-04-09 LAB
ANION GAP SERPL CALCULATED.3IONS-SCNC: 7 MMOL/L (ref 5–15)
BUN SERPL-MCNC: 14 MG/DL (ref 8–23)
BUN/CREAT SERPL: 11.1 (ref 7–25)
CALCIUM SPEC-SCNC: 7.8 MG/DL (ref 8.6–10.5)
CHLORIDE SERPL-SCNC: 100 MMOL/L (ref 98–107)
CO2 SERPL-SCNC: 28 MMOL/L (ref 22–29)
CREAT SERPL-MCNC: 1.26 MG/DL (ref 0.76–1.27)
DEPRECATED RDW RBC AUTO: 51.4 FL (ref 37–54)
EGFRCR SERPLBLD CKD-EPI 2021: 56.2 ML/MIN/1.73
ERYTHROCYTE [DISTWIDTH] IN BLOOD BY AUTOMATED COUNT: 16.7 % (ref 12.3–15.4)
GLUCOSE BLDC GLUCOMTR-MCNC: 168 MG/DL (ref 70–130)
GLUCOSE BLDC GLUCOMTR-MCNC: 183 MG/DL (ref 70–130)
GLUCOSE BLDC GLUCOMTR-MCNC: 205 MG/DL (ref 70–130)
GLUCOSE BLDC GLUCOMTR-MCNC: 93 MG/DL (ref 70–130)
GLUCOSE SERPL-MCNC: 220 MG/DL (ref 65–99)
HCT VFR BLD AUTO: 22.4 % (ref 37.5–51)
HGB BLD-MCNC: 6.7 G/DL (ref 13–17.7)
MAGNESIUM SERPL-MCNC: 2.2 MG/DL (ref 1.6–2.4)
MCH RBC QN AUTO: 26 PG (ref 26.6–33)
MCHC RBC AUTO-ENTMCNC: 29.9 G/DL (ref 31.5–35.7)
MCV RBC AUTO: 86.8 FL (ref 79–97)
PHOSPHATE SERPL-MCNC: 3 MG/DL (ref 2.5–4.5)
PLATELET # BLD AUTO: 234 10*3/MM3 (ref 140–450)
PMV BLD AUTO: 11.5 FL (ref 6–12)
POTASSIUM SERPL-SCNC: 4.5 MMOL/L (ref 3.5–5.2)
RBC # BLD AUTO: 2.58 10*6/MM3 (ref 4.14–5.8)
SODIUM SERPL-SCNC: 135 MMOL/L (ref 136–145)
WBC NRBC COR # BLD: 6.28 10*3/MM3 (ref 3.4–10.8)

## 2022-04-09 PROCEDURE — 84100 ASSAY OF PHOSPHORUS: CPT | Performed by: STUDENT IN AN ORGANIZED HEALTH CARE EDUCATION/TRAINING PROGRAM

## 2022-04-09 PROCEDURE — 85027 COMPLETE CBC AUTOMATED: CPT | Performed by: STUDENT IN AN ORGANIZED HEALTH CARE EDUCATION/TRAINING PROGRAM

## 2022-04-09 PROCEDURE — 63710000001 INSULIN LISPRO (HUMAN) PER 5 UNITS: Performed by: NURSE PRACTITIONER

## 2022-04-09 PROCEDURE — P9016 RBC LEUKOCYTES REDUCED: HCPCS

## 2022-04-09 PROCEDURE — 86900 BLOOD TYPING SEROLOGIC ABO: CPT

## 2022-04-09 PROCEDURE — 83735 ASSAY OF MAGNESIUM: CPT | Performed by: STUDENT IN AN ORGANIZED HEALTH CARE EDUCATION/TRAINING PROGRAM

## 2022-04-09 PROCEDURE — 36415 COLL VENOUS BLD VENIPUNCTURE: CPT | Performed by: STUDENT IN AN ORGANIZED HEALTH CARE EDUCATION/TRAINING PROGRAM

## 2022-04-09 PROCEDURE — 82962 GLUCOSE BLOOD TEST: CPT

## 2022-04-09 PROCEDURE — 80048 BASIC METABOLIC PNL TOTAL CA: CPT | Performed by: STUDENT IN AN ORGANIZED HEALTH CARE EDUCATION/TRAINING PROGRAM

## 2022-04-09 PROCEDURE — 36430 TRANSFUSION BLD/BLD COMPNT: CPT

## 2022-04-09 RX ADMIN — MAGNESIUM OXIDE 400 MG (241.3 MG MAGNESIUM) TABLET 400 MG: TABLET at 09:19

## 2022-04-09 RX ADMIN — ATORVASTATIN CALCIUM 40 MG: 20 TABLET, FILM COATED ORAL at 09:20

## 2022-04-09 RX ADMIN — Medication 10 ML: at 21:32

## 2022-04-09 RX ADMIN — DIGOXIN 62.5 MCG: 125 TABLET ORAL at 12:04

## 2022-04-09 RX ADMIN — FAMOTIDINE 20 MG: 20 TABLET ORAL at 06:17

## 2022-04-09 RX ADMIN — METOPROLOL SUCCINATE 12.5 MG: 25 TABLET, EXTENDED RELEASE ORAL at 09:19

## 2022-04-09 RX ADMIN — FAMOTIDINE 20 MG: 20 TABLET ORAL at 17:55

## 2022-04-09 RX ADMIN — INSULIN LISPRO 4 UNITS: 100 INJECTION, SOLUTION INTRAVENOUS; SUBCUTANEOUS at 12:04

## 2022-04-09 RX ADMIN — INSULIN LISPRO 2 UNITS: 100 INJECTION, SOLUTION INTRAVENOUS; SUBCUTANEOUS at 09:20

## 2022-04-09 RX ADMIN — Medication 1 CAPSULE: at 09:20

## 2022-04-09 RX ADMIN — MAGNESIUM OXIDE 400 MG (241.3 MG MAGNESIUM) TABLET 400 MG: TABLET at 21:32

## 2022-04-09 RX ADMIN — INSULIN LISPRO 4 UNITS: 100 INJECTION, SOLUTION INTRAVENOUS; SUBCUTANEOUS at 17:54

## 2022-04-09 NOTE — PLAN OF CARE
Goal Outcome Evaluation:  Plan of Care Reviewed With: patient        Progress: no change   A&OX4-able to make needs known. VSS. A-fib on monitor. RA. SCD applied. Accumax applied to bed. Z-guard to bottom. Q2 hour turn. Bed in low position. Call light in reach. Bed alarm on.

## 2022-04-09 NOTE — PROGRESS NOTES
"DAILY PROGRESS NOTE  [facility]    Patient Identification:  Name: Francisco Sequeira  Age: 86 y.o.  Sex: male  :  1935  MRN: [id number]         Primary Care Physician: Rubin Hinkle APRN      Subjective  Patient with no new complaints.    Objective:  General Appearance:  Comfortable, in no acute distress and not in pain (Thin, frail.).    Vital signs: (most recent): Blood pressure 108/60, pulse 83, temperature 98 °F (36.7 °C), temperature source Oral, resp. rate 18, height 182.9 cm (72\"), weight 68.5 kg (151 lb 0.2 oz), SpO2 99 %.    Lungs:  Normal effort and normal respiratory rate.  Breath sounds clear to auscultation.    Heart: Normal rate.  Irregular rhythm.  (Prosthetic click present.)  Extremities: There is no dependent edema.    Neurological: Patient is alert and oriented to person, place and time.    Skin:  Warm and dry.                Vital signs in last 24 hours:  Temp:  [97.9 °F (36.6 °C)-98 °F (36.7 °C)] 98 °F (36.7 °C)  Heart Rate:  [79-89] 83  Resp:  [18] 18  BP: (108-130)/(55-64) 108/60    Intake/Output:    Intake/Output Summary (Last 24 hours) at 2022 1344  Last data filed at 2022  Gross per 24 hour   Intake 600 ml   Output 200 ml   Net 400 ml         Results from last 7 days   Lab Units 22  0914 22  0151 22  1613 22  1234 22  2347 22  1653 22  0951 22  2304 22  0711 22  0617   WBC 10*3/mm3 6.28 8.37  --  10.81*  --   --  7.40 7.51 6.36 5.59   HEMOGLOBIN g/dL 6.7* 7.4* 7.2* 8.1* 8.2* 7.4* 7.8*  7.8* 8.6* 9.1* 9.1*   PLATELETS 10*3/mm3 234 238  --  270  --   --  213 242 201 184     Results from last 7 days   Lab Units 22  0914 22  0151 22  1234 22  0951 22  0002 22  0711 22  0617   SODIUM mmol/L 135* 139 138 138 138 139 139   POTASSIUM mmol/L 4.5 4.2 4.4 4.2 4.1 4.3 4.3   CHLORIDE mmol/L 100 101 100 101 100 107 108*   CO2 mmol/L 28.0 30.0* 29.2* 32.0* 29.6* 26.7 " 26.0   BUN mg/dL 14 13 13 11 11 10 12   CREATININE mg/dL 1.26 1.28* 1.19 1.14 1.09 0.96 0.99   GLUCOSE mg/dL 220* 165* 206* 161* 184* 143* 147*   Estimated Creatinine Clearance: 42.3 mL/min (by C-G formula based on SCr of 1.26 mg/dL).  Results from last 7 days   Lab Units 04/09/22  0914 04/08/22  0151 04/07/22  1234 04/06/22  0951 04/06/22  0002 04/04/22  0711 04/03/22  0617   CALCIUM mg/dL 7.8* 8.0* 8.5* 8.1* 8.5* 8.2* 7.7*   ALBUMIN g/dL  --   --   --   --  2.70*  --  2.20*   MAGNESIUM mg/dL 2.2 2.0 2.0  --   --   --   --    PHOSPHORUS mg/dL 3.0 3.1 2.7  --   --   --   --      Results from last 7 days   Lab Units 04/06/22  0002 04/03/22  0617   ALBUMIN g/dL 2.70* 2.20*   BILIRUBIN mg/dL 0.6 0.3   ALK PHOS U/L 131* 160*   AST (SGOT) U/L 14 18   ALT (SGPT) U/L 11 13       Assessment:    Hematoma of iliopsoas muscle, left, initial encounter: Secondary to anticoagulants.  Coumadin, Lovenox, aspirin all on hold.  Continue to monitor closely.  Orthopedic input appreciated.  Acute blood loss anemia: Secondary to above.  Transfuse 1 unit packed RBCs this morning and continue to follow very closely.    Atrial fibrillation (HCC): Rate controlled.  Anticoagulation on hold.    Hypertension:    Type 2 diabetes mellitus (HCC): Blood sugars running high.  Resume long-acting insulin.    H/O heart valve replacement with mechanical valve: Anticoagulation on hold.    Stage 3b chronic kidney disease (HCC)    Chronic anticoagulation    History of CVA (cerebrovascular accident)    S/P laparoscopic cholecystectomy        Plan:  Please see above.  Discussed with patient and wife at bedside.    Robb Baldwin MD  4/9/2022  13:44 EDT

## 2022-04-09 NOTE — PROGRESS NOTES
"         LOS: 3 days     Subjective :   Patient seen and examined.  He is resting comfortably in his hospital bed.  He is awake, alert, oriented and responding appropriately.  His wife is at bedside.  Has not undergone hematoma aspiration as of yet.  He does, however report that he is able to \"move his leg a little bit \"which she states is an improvement from yesterday.  Denies any new concerns.    Objective :    Vital signs in last 24 hours:  Vitals:    04/08/22 1241 04/08/22 1429 04/08/22 2005 04/08/22 2345   BP: 137/76 130/57 118/55 122/64   BP Location: Right arm Right arm Right arm Right arm   Patient Position: Lying Lying Lying Lying   Pulse: 83 89 79 84   Resp: 18 18 18 18   Temp: 97.9 °F (36.6 °C)  98 °F (36.7 °C) 97.9 °F (36.6 °C)   TempSrc: Oral  Oral Oral   SpO2: 99% 100% 100% 100%   Weight: 68.5 kg (151 lb 0.2 oz)      Height: 182.9 cm (72\")          PHYSICAL EXAM:  Patient is calm, in no acute distress, awake and oriented x 3.  LLE with decreased strength in hip flexion.  Graded at 1-2/5. He is unable to maintain hip flexion when placed into gentle passive straight leg raise.  Subjective report of improvement of anterior thigh numbness.  He states that sensation is equal and intact to the bilateral anterior thighs.      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    Still with plans for aspiration of hematoma    May continue to work with physical therapy with weightbearing as tolerated to the left lower extremity.    Derian Dozier, APRN    Date: 4/9/2022  Time: 07:51 EDT

## 2022-04-10 LAB
ANION GAP SERPL CALCULATED.3IONS-SCNC: 9 MMOL/L (ref 5–15)
BH BB BLOOD EXPIRATION DATE: NORMAL
BH BB BLOOD TYPE BARCODE: 5100
BH BB DISPENSE STATUS: NORMAL
BH BB PRODUCT CODE: NORMAL
BH BB UNIT NUMBER: NORMAL
BUN SERPL-MCNC: 14 MG/DL (ref 8–23)
BUN/CREAT SERPL: 10.6 (ref 7–25)
CALCIUM SPEC-SCNC: 8.2 MG/DL (ref 8.6–10.5)
CHLORIDE SERPL-SCNC: 100 MMOL/L (ref 98–107)
CO2 SERPL-SCNC: 27 MMOL/L (ref 22–29)
CREAT SERPL-MCNC: 1.32 MG/DL (ref 0.76–1.27)
CROSSMATCH INTERPRETATION: NORMAL
DEPRECATED RDW RBC AUTO: 51.6 FL (ref 37–54)
EGFRCR SERPLBLD CKD-EPI 2021: 53.2 ML/MIN/1.73
ERYTHROCYTE [DISTWIDTH] IN BLOOD BY AUTOMATED COUNT: 16.3 % (ref 12.3–15.4)
GLUCOSE BLDC GLUCOMTR-MCNC: 113 MG/DL (ref 70–130)
GLUCOSE BLDC GLUCOMTR-MCNC: 120 MG/DL (ref 70–130)
GLUCOSE BLDC GLUCOMTR-MCNC: 159 MG/DL (ref 70–130)
GLUCOSE BLDC GLUCOMTR-MCNC: 231 MG/DL (ref 70–130)
GLUCOSE SERPL-MCNC: 203 MG/DL (ref 65–99)
HCT VFR BLD AUTO: 27.2 % (ref 37.5–51)
HGB BLD-MCNC: 8.3 G/DL (ref 13–17.7)
MAGNESIUM SERPL-MCNC: 2.4 MG/DL (ref 1.6–2.4)
MCH RBC QN AUTO: 26.3 PG (ref 26.6–33)
MCHC RBC AUTO-ENTMCNC: 30.5 G/DL (ref 31.5–35.7)
MCV RBC AUTO: 86.3 FL (ref 79–97)
PHOSPHATE SERPL-MCNC: 2.9 MG/DL (ref 2.5–4.5)
PLATELET # BLD AUTO: 262 10*3/MM3 (ref 140–450)
PMV BLD AUTO: 11.4 FL (ref 6–12)
POTASSIUM SERPL-SCNC: 4.7 MMOL/L (ref 3.5–5.2)
RBC # BLD AUTO: 3.15 10*6/MM3 (ref 4.14–5.8)
SODIUM SERPL-SCNC: 136 MMOL/L (ref 136–145)
UNIT  ABO: NORMAL
UNIT  RH: NORMAL
WBC NRBC COR # BLD: 5.73 10*3/MM3 (ref 3.4–10.8)

## 2022-04-10 PROCEDURE — 82962 GLUCOSE BLOOD TEST: CPT

## 2022-04-10 PROCEDURE — 83735 ASSAY OF MAGNESIUM: CPT | Performed by: STUDENT IN AN ORGANIZED HEALTH CARE EDUCATION/TRAINING PROGRAM

## 2022-04-10 PROCEDURE — 85027 COMPLETE CBC AUTOMATED: CPT | Performed by: STUDENT IN AN ORGANIZED HEALTH CARE EDUCATION/TRAINING PROGRAM

## 2022-04-10 PROCEDURE — 84100 ASSAY OF PHOSPHORUS: CPT | Performed by: STUDENT IN AN ORGANIZED HEALTH CARE EDUCATION/TRAINING PROGRAM

## 2022-04-10 PROCEDURE — 36415 COLL VENOUS BLD VENIPUNCTURE: CPT | Performed by: STUDENT IN AN ORGANIZED HEALTH CARE EDUCATION/TRAINING PROGRAM

## 2022-04-10 PROCEDURE — 97530 THERAPEUTIC ACTIVITIES: CPT

## 2022-04-10 PROCEDURE — 80048 BASIC METABOLIC PNL TOTAL CA: CPT | Performed by: STUDENT IN AN ORGANIZED HEALTH CARE EDUCATION/TRAINING PROGRAM

## 2022-04-10 PROCEDURE — 63710000001 INSULIN LISPRO (HUMAN) PER 5 UNITS: Performed by: NURSE PRACTITIONER

## 2022-04-10 RX ADMIN — Medication 10 ML: at 20:02

## 2022-04-10 RX ADMIN — ATORVASTATIN CALCIUM 40 MG: 20 TABLET, FILM COATED ORAL at 08:08

## 2022-04-10 RX ADMIN — Medication 10 ML: at 08:10

## 2022-04-10 RX ADMIN — INSULIN LISPRO 4 UNITS: 100 INJECTION, SOLUTION INTRAVENOUS; SUBCUTANEOUS at 12:37

## 2022-04-10 RX ADMIN — METOPROLOL SUCCINATE 12.5 MG: 25 TABLET, EXTENDED RELEASE ORAL at 08:09

## 2022-04-10 RX ADMIN — DIGOXIN 62.5 MCG: 125 TABLET ORAL at 12:38

## 2022-04-10 RX ADMIN — FAMOTIDINE 20 MG: 20 TABLET ORAL at 08:08

## 2022-04-10 RX ADMIN — MAGNESIUM OXIDE 400 MG (241.3 MG MAGNESIUM) TABLET 400 MG: TABLET at 08:08

## 2022-04-10 RX ADMIN — Medication 1 CAPSULE: at 08:09

## 2022-04-10 RX ADMIN — VANCOMYCIN HYDROCHLORIDE 125 MG: 125 CAPSULE ORAL at 08:09

## 2022-04-10 RX ADMIN — INSULIN LISPRO 2 UNITS: 100 INJECTION, SOLUTION INTRAVENOUS; SUBCUTANEOUS at 17:31

## 2022-04-10 RX ADMIN — FAMOTIDINE 20 MG: 20 TABLET ORAL at 17:32

## 2022-04-10 RX ADMIN — MAGNESIUM OXIDE 400 MG (241.3 MG MAGNESIUM) TABLET 400 MG: TABLET at 20:02

## 2022-04-10 RX ADMIN — FERROUS SULFATE TAB 325 MG (65 MG ELEMENTAL FE) 325 MG: 325 (65 FE) TAB at 08:09

## 2022-04-10 RX ADMIN — INSULIN GLARGINE-YFGN 15 UNITS: 100 INJECTION, SOLUTION SUBCUTANEOUS at 20:01

## 2022-04-10 NOTE — THERAPY TREATMENT NOTE
Patient Name: Francisco Sequeira  : 1935    MRN: [id number]                              Today's Date: 4/10/2022       Admit Date: 2022    Visit Dx:     ICD-10-CM ICD-9-CM   1. Hematoma of iliopsoas muscle, left, initial encounter  S70.12XA 924.00   2. Chronic anticoagulation  Z79.01 V58.61   3. H/O heart valve replacement with mechanical valve  Z95.2 V43.3   4. Immobility syndrome  M62.3 728.3   5. Anemia, unspecified type  D64.9 285.9   6. 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)   • Iron deficiency anemia   • Chronic anticoagulation   • Hemiparesis of left nondominant side as late effect of cerebral infarction (HCC)   • Rectus sheath hematoma   • Sepsis (HCC)   • Calculus of gallbladder with acute cholecystitis without obstruction   • History of Clostridium difficile infection   • Aspiration pneumonitis (HCC)   • Hematoma of iliopsoas muscle, left, initial encounter   • History of CVA (cerebrovascular accident)   • S/P laparoscopic cholecystectomy   • Anemia     Past Medical History:   Diagnosis Date   • Allergic rhinitis    • Aortic valve insufficiency    • Ascending aortic aneurysm (HCC)    • Atrial fibrillation (HCC)    • Bacteremia    • Calcific aortic stenosis of bicuspid valve    • Cardiac arrest (HCC)    • Cardiomyopathy (HCC)    • CKD (chronic kidney disease) stage 3, GFR 30-59 ml/min (HCC)    • Contact dermatitis due to poison ivy    • Elbow fracture    • Esophageal reflux    • GERD (gastroesophageal reflux disease)    • Head  injury    • History of transfusion    • Hyperglycemia    • Hyperlipidemia    • Hypertension    • Kidney carcinoma (HCC)    • Nonischemic cardiomyopathy (HCC)    • Renal oncocytoma    • Seasonal allergic reaction    • Sinusitis    • Syncope    • Type 2 diabetes mellitus (HCC)     uncontrolled   • Visual field defect      Past Surgical History:   Procedure Laterality Date   • AORTIC VALVE REPAIR/REPLACEMENT     • ASCENDING AORTIC ANEURYSM REPAIR W/ MECHANICAL AORTIC VALVE REPLACEMENT     • CHOLECYSTECTOMY WITH INTRAOPERATIVE CHOLANGIOGRAM N/A 3/29/2022    Procedure: Laparoscopic cholecystectomy with cholangiogram, possible open;  Surgeon: Lisa Guidry MD;  Location: [facility] MAIN OR;  Service: General;  Laterality: N/A;   • COLONOSCOPY N/A 10/28/2021    Procedure: COLONOSCOPY to cecum:  cold snare polyps,;  Surgeon: Dinesh Meza MD;  Location: [facility] ENDOSCOPY;  Service: Gastroenterology;  Laterality: N/A;  pre:  Iron deficiency anemia  post:  polyps, diverticulosis,    • COLONOSCOPY N/A 11/9/2021    Procedure: COLONOSCOPY to cecum with APC cautery of AVM and clip placement x1;  Surgeon: Pavan Rodriguez MD;  Location: [facility] ENDOSCOPY;  Service: Gastroenterology;  Laterality: N/A;  PRE - gi bleed, anemia  POST - diverticulosis, poor prep, AVM right colon   • ENDOSCOPY N/A 10/28/2021    Procedure: ESOPHAGOGASTRODUODENOSCOPY with biopsies;  Surgeon: Dinesh Meza MD;  Location: [facility] ENDOSCOPY;  Service: Gastroenterology;  Laterality: N/A;  pre:  Iron deficiency anemia  post:  duodenitis and gastritis   • EYE SURGERY  12/09/2020    cataract surgery    • NEPHRECTOMY     • OTHER SURGICAL HISTORY      elbow surgery   • OTHER SURGICAL HISTORY      right arm surgery   • PROSTATE SURGERY     • THORACENTESIS Left     diagnostic      General Information     Row Name 04/10/22 1556          Physical Therapy Time and Intention    Document Type therapy note (daily note)  -DB     Mode of Treatment physical  therapy;individual therapy  -DB     Row Name 04/10/22 1556          General Information    Patient Profile Reviewed yes  -DB           User Key  (r) = Recorded By, (t) = Taken By, (c) = Cosigned By    Initials Name Provider Type    Angelica Hackett PT Physical Therapist               Mobility     Row Name 04/10/22 1556          Bed Mobility    Bed Mobility supine-sit;sit-supine;scooting/bridging  -DB     Scooting/Bridging [place] (Bed Mobility) dependent (less than 25% patient effort);2 person assist  -DB     Supine-Sit [place] (Bed Mobility) minimum assist (75% patient effort);verbal cues  -DB     Sit-Supine [place] (Bed Mobility) minimum assist (75% patient effort);verbal cues  -DB     Assistive Device (Bed Mobility) bed rails;head of bed elevated;draw sheet  -DB     Row Name 04/10/22 1556          Sit-Stand Transfer    Sit-Stand [place] (Transfers) minimum assist (75% patient effort);verbal cues;2 person assist  -DB     Assistive Device (Sit-Stand Transfers) walker, front-wheeled  -DB     Comment, (Sit-Stand Transfer) performed 6x, able to stand for 1-2 min each rep; performed with weight shifting. attempted gait, pt's LLE buckled and sat back into bed. unable to take steps this date.  -DB     Row Name 04/10/22 1556          Gait/Stairs (Locomotion)    [place] Level (Gait) unable to assess  -DB           User Key  (r) = Recorded By, (t) = Taken By, (c) = Cosigned By    Initials Name Provider Type    Angelica Hackett PT Physical Therapist               Obj/Interventions     Row Name 04/10/22 1558          Balance    Balance Assessment sitting static balance;sitting dynamic balance;standing static balance  -DB     Static Sitting Balance supervision  -DB     Dynamic Sitting Balance supervision  -DB     Position, Sitting Balance sitting edge of bed;unsupported  -DB     Static Standing Balance contact guard  -DB     Position/Device Used, Standing Balance supported;walker, front-wheeled   -DB     Balance Interventions sitting;standing;sit to stand;static;dynamic;weight shifting activity  -DB           User Key  (r) = Recorded By, (t) = Taken By, (c) = Cosigned By    Initials Name Provider Type    Angelica Hackett PT Physical Therapist               Goals/Plan    No documentation.                Clinical Impression     Row Name 04/10/22 1606          Pain    Pretreatment Pain Rating 0/10 - no pain  -DB     Posttreatment Pain Rating 0/10 - no pain  -DB     Pain Intervention(s) Ambulation/increased activity;Repositioned  -DB     Row Name 04/10/22 1606          Plan of Care Review    Plan of Care Reviewed With patient  -DB     Progress improving  -DB     Outcome Evaluation Pt agreeable to PT session this afternoon. Denies pain in L hip today. Emili for bed mobility and demo's good sitting balance EOB. Pt performs STS to RW with Emili x2, VC/TC. Attempted to transfer to chair today, unsuccessful d/t pt's L knee buckling and pt needing to sit back on the bed. Pt agreeable to perform multiple STS today with practicing weight shift in standing. Pt req's cues for balance, when shifting to L side, leans anteriorly and req's heavier assist to maintain balance. Pt returns to supine at end of the session. Continues to benefit from skilled PT.  -DB     Row Name 04/10/22 1606          Vital Signs    O2 Delivery Pre Treatment room air  -DB     O2 Delivery Intra Treatment room air  -DB     O2 Delivery Post Treatment room air  -DB     Pre Patient Position Supine  -DB     Intra Patient Position Standing  -DB     Post Patient Position Supine  -DB     Row Name 04/10/22 1606          Positioning and Restraints    Pre-Treatment Position in bed  -DB     Post Treatment Position bed  -DB     In Bed supine;call light within reach;encouraged to call for assist;exit alarm on;with family/caregiver  -DB           User Key  (r) = Recorded By, (t) = Taken By, (c) = Cosigned By    Initials Name Provider Type    ROSY Pierson  Angelica, PT Physical Therapist               Outcome Measures     Row Name 04/10/22 1614          How much help from another person do you currently need...    Turning from your back to your side while in flat bed without using bedrails? 3  -DB     Moving from lying on back to sitting on the side of a flat bed without bedrails? 3  -DB     Moving to and from a bed to a chair (including a wheelchair)? 2  -DB     Standing up from a chair using your arms (e.g., wheelchair, bedside chair)? 3  -DB     Climbing 3-5 steps with a railing? 1  -DB     To walk in hospital room? 1  -DB     AM-PAC 6 Clicks Score (PT) 13  -DB     Row Name 04/10/22 1614          Functional Assessment    Outcome Measure Options AM-PAC 6 Clicks Basic Mobility (PT)  -DB           User Key  (r) = Recorded By, (t) = Taken By, (c) = Cosigned By    Initials Name Provider Type    DB Angelica Pierson, PT Physical Therapist                             Physical Therapy Education                 Title: PT OT SLP Therapies (In Progress)     Topic: Physical Therapy (In Progress)     Point: Mobility training (In Progress)     Learning Progress Summary           Patient Acceptance, E, VU by DB at 4/10/2022 1614    Acceptance, E,TB, NR by MS at 4/7/2022 1535   Family Acceptance, E, VU by DB at 4/10/2022 1614   Significant Other Acceptance, E,TB, NR by MS at 4/7/2022 1535                   Point: Home exercise program (In Progress)     Learning Progress Summary           Patient Acceptance, E, VU by DB at 4/10/2022 1614    Acceptance, E,TB, NR by MS at 4/7/2022 1535   Family Acceptance, E, VU by DB at 4/10/2022 1614   Significant Other Acceptance, E,TB, NR by MS at 4/7/2022 1535                   Point: Body mechanics (In Progress)     Learning Progress Summary           Patient Acceptance, E, VU by DB at 4/10/2022 1614    Acceptance, E,TB, NR by MS at 4/7/2022 1535   Family Acceptance, E, VU by DB at 4/10/2022 1614   Significant Other Acceptance, E,TB, NR by MS at  4/7/2022 1535                   Point: Precautions (In Progress)     Learning Progress Summary           Patient Acceptance, E, VU by DB at 4/10/2022 1614    Acceptance, E,TB, NR by MS at 4/7/2022 1535   Family Acceptance, E, VU by DB at 4/10/2022 1614   Significant Other Acceptance, E,TB, NR by MS at 4/7/2022 1535                               User Key     Initials Effective Dates Name Provider Type Discipline    MS 06/16/21 -  Goldie Abrams PT Physical Therapist PT    DB 06/16/21 -  Angelica Pierson PT Physical Therapist PT              PT Recommendation and Plan     Plan of Care Reviewed With: patient  Progress: improving  Outcome Evaluation: Pt agreeable to PT session this afternoon. Denies pain in L hip today. Emili for bed mobility and demo's good sitting balance EOB. Pt performs STS to RW with Emili x2, VC/TC. Attempted to transfer to chair today, unsuccessful d/t pt's L knee buckling and pt needing to sit back on the bed. Pt agreeable to perform multiple STS today with practicing weight shift in standing. Pt req's cues for balance, when shifting to L side, leans anteriorly and req's heavier assist to maintain balance. Pt returns to supine at end of the session. Continues to benefit from skilled PT.     Time Calculation:    PT Charges     Row Name 04/10/22 1614             Time Calculation    Start Time 1453  -DB      Stop Time 1516  -DB      Time Calculation (min) 23 min  -DB      PT Received On 04/10/22  -DB      PT - Next Appointment 04/11/22  -DB              Time Calculation- PT    Total Timed Code Minutes- PT 23 minute(s)  -DB            User Key  (r) = Recorded By, (t) = Taken By, (c) = Cosigned By    Initials Name Provider Type    DB Angelica Pierson, PT Physical Therapist              Therapy Charges for Today     Code Description Service Date Service Provider Modifiers Qty    96704640654 HC PT THERAPEUTIC ACT EA 15 MIN 4/10/2022 Angelica Pierson PT GP 2          PT G-Codes  Outcome Measure  Options: AM-PAC 6 Clicks Basic Mobility (PT)  AM-[facility] 6 Clicks Score (PT): 13    Angelica Pierson, PT  4/10/2022

## 2022-04-10 NOTE — PROGRESS NOTES
"DAILY PROGRESS NOTE  [facility]    Patient Identification:  Name: Francisco Sequeira  Age: 86 y.o.  Sex: male  :  1935  MRN: [id number]         Primary Care Physician: Rubin Hinkle APRN      Subjective  Patient with no new complaints.  Overall feeling well.  Notes there is been a slight improvement in the strength of his left lower extremity.    Objective:  General Appearance:  Comfortable, well-appearing, in no acute distress and not in pain.    Vital signs: (most recent): Blood pressure 139/67, pulse 81, temperature 97.9 °F (36.6 °C), temperature source Oral, resp. rate 16, height 182.9 cm (72\"), weight 68.5 kg (151 lb 0.2 oz), SpO2 96 %.    Lungs:  Normal effort and normal respiratory rate.  Breath sounds clear to auscultation.    Heart: Normal rate.  Irregular rhythm.  (Prosthetic click present.)  Extremities: There is no dependent edema.    Neurological: Patient is alert and oriented to person, place and time.    Skin:  Warm and dry.                Vital signs in last 24 hours:  Temp:  [97.4 °F (36.3 °C)-98.8 °F (37.1 °C)] 97.9 °F (36.6 °C)  Heart Rate:  [75-88] 81  Resp:  [16-18] 16  BP: (108-139)/(49-75) 139/67    Intake/Output:    Intake/Output Summary (Last 24 hours) at 4/10/2022 1143  Last data filed at 2022 1830  Gross per 24 hour   Intake 690 ml   Output --   Net 690 ml         Results from last 7 days   Lab Units 04/10/22  0939 22  0914 22  0151 22  1613 22  1234 22  2347 22  1653 22  0951 22  2304 22  0711   WBC 10*3/mm3 5.73 6.28 8.37  --  10.81*  --   --  7.40 7.51 6.36   HEMOGLOBIN g/dL 8.3* 6.7* 7.4* 7.2* 8.1* 8.2* 7.4* 7.8*  7.8* 8.6* 9.1*   PLATELETS 10*3/mm3 262 234 238  --  270  --   --  213 242 201     Results from last 7 days   Lab Units 04/10/22  0939 22  0914 22  0151 22  1234 22  0951 22  0002 22  0711   SODIUM mmol/L 136 135* 139 138 138 138 139   POTASSIUM mmol/L 4.7 " 4.5 4.2 4.4 4.2 4.1 4.3   CHLORIDE mmol/L 100 100 101 100 101 100 107   CO2 mmol/L 27.0 28.0 30.0* 29.2* 32.0* 29.6* 26.7   BUN mg/dL 14 14 13 13 11 11 10   CREATININE mg/dL 1.32* 1.26 1.28* 1.19 1.14 1.09 0.96   GLUCOSE mg/dL 203* 220* 165* 206* 161* 184* 143*   Estimated Creatinine Clearance: 40.4 mL/min (A) (by C-G formula based on SCr of 1.32 mg/dL (H)).  Results from last 7 days   Lab Units 04/10/22  0939 04/09/22  0914 04/08/22  0151 04/07/22  1234 04/06/22  0951 04/06/22  0002 04/04/22  0711   CALCIUM mg/dL 8.2* 7.8* 8.0* 8.5* 8.1* 8.5* 8.2*   ALBUMIN g/dL  --   --   --   --   --  2.70*  --    MAGNESIUM mg/dL 2.4 2.2 2.0 2.0  --   --   --    PHOSPHORUS mg/dL 2.9 3.0 3.1 2.7  --   --   --      Results from last 7 days   Lab Units 04/06/22  0002   ALBUMIN g/dL 2.70*   BILIRUBIN mg/dL 0.6   ALK PHOS U/L 131*   AST (SGOT) U/L 14   ALT (SGPT) U/L 11       Assessment:    Hematoma of iliopsoas muscle, left, initial encounter: Secondary to anticoagulants.  Coumadin, Lovenox, aspirin all on hold.  Continue to monitor closely.  Orthopedic input appreciated.    Acute blood loss anemia: Secondary to above.  .  Good response to 1 unit packed RBCs.  Continue to monitor.    Atrial fibrillation (HCC): Rate controlled.  Anticoagulation on hold.    H/O heart valve replacement with mechanical valve: Anticoagulation on hold.    Hypertension:    Type 2 diabetes mellitus (HCC):  Blood sugar is high this morning but it is noted that he did not receive his Lantus dose last night.    Stage 3b chronic kidney disease (HCC)    Chronic anticoagulation    History of CVA (cerebrovascular accident)    S/P laparoscopic cholecystectomy      Plan:  Please see above.  Possible IR drainage of hematoma tomorrow.    Robb Baldwin MD  4/10/2022  11:43 EDT

## 2022-04-10 NOTE — PROGRESS NOTES
LOS: 4 days     Subjective :   Patient seen and examined.  The patient is resting comfortably.  His wife is at bedside.  States that he does still have numbness in his left thigh.  Feels that maybe he has regained some strength though he is not sure.  He did receive transfusion yesterday.  Denies any significant feeling of shortness of breath or malaise.    Objective :    Vital signs in last 24 hours:  Vitals:    04/09/22 2006 04/09/22 2355 04/10/22 0726 04/10/22 0809   BP: 125/64 119/64 139/67 139/67   BP Location: Right arm Left arm Left arm    Patient Position: Lying Lying Lying    Pulse: 83 87 81 81   Resp: 18 18 16    Temp: 98.8 °F (37.1 °C) 97.7 °F (36.5 °C) 97.9 °F (36.6 °C)    TempSrc: Oral Oral Oral    SpO2: 98% 97% 96%    Weight:       Height:           PHYSICAL EXAM:  Patient is calm, in no acute distress, awake and oriented x 3.  LLE with decreased strength in hip flexion.  Graded at 1-2/5.  He will maintain passive hip flexion for approximately 5 seconds before dropping his leg back down.  This is an improvement from prior exam subjective report of improvement of anterior thigh numbness.  He states that sensation is equal and intact to the bilateral anterior thighs.      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  I advised both the patient and his wife that this is a good prognostic  indicator.  I still would recommend attempting an aspiration of the hematoma to remove further pressure on the nerve.  We did discuss that this does obviously carry a risk of removing any tamponade that has occurred however this is now several days out from the injury and the likelihood should be quite low.  Recommend continuing to hold any anticoagulation until hemoglobin remained stable for a longer period of time.    Still with plans for aspiration of hematoma      May continue to work with physical therapy with weightbearing as tolerated to the left lower extremity.    Maxi Pham MD    Date: 4/10/2022  Time: 11:43 EDT   details… Regular rate & rhythm, normal S1, S2; no murmurs, gallops or rubs; no S3, S4

## 2022-04-10 NOTE — PLAN OF CARE
Goal Outcome Evaluation:  Plan of Care Reviewed With: patient        Progress: improving  Outcome Evaluation: Pt agreeable to PT session this afternoon. Denies pain in L hip today. Emili for bed mobility and demo's good sitting balance EOB. Pt performs STS to RW with Emili x2, VC/TC. Attempted to transfer to chair today, unsuccessful d/t pt's L knee buckling and pt needing to sit back on the bed. Pt agreeable to perform multiple STS today with practicing weight shift in standing. Pt req's cues for balance, when shifting to L side, leans anteriorly and req's heavier assist to maintain balance. Pt returns to supine at end of the session. Continues to benefit from skilled PT.

## 2022-04-10 NOTE — PLAN OF CARE
Goal Outcome Evaluation:  Plan of Care Reviewed With: patient        Progress: improving   A&OX4-able to make needs known. VSS. A-fib on monitor. 2.3 second pause x1. RA. SCD on. NON for lantus at HS. Refused Lantus at bedtime r/t BS 91. States pain to LLE is better this evening and ROM has slightly improved. Accumax on bed. Z-guard to bottom. Q2 hour turn. Bed in low position. Call light in reach. Bed alarm on.

## 2022-04-11 ENCOUNTER — APPOINTMENT (OUTPATIENT)
Dept: CT IMAGING | Facility: HOSPITAL | Age: 87
End: 2022-04-11

## 2022-04-11 LAB
ANION GAP SERPL CALCULATED.3IONS-SCNC: 7 MMOL/L (ref 5–15)
BUN SERPL-MCNC: 13 MG/DL (ref 8–23)
BUN/CREAT SERPL: 11.3 (ref 7–25)
CALCIUM SPEC-SCNC: 8 MG/DL (ref 8.6–10.5)
CHLORIDE SERPL-SCNC: 103 MMOL/L (ref 98–107)
CO2 SERPL-SCNC: 26 MMOL/L (ref 22–29)
CREAT SERPL-MCNC: 1.15 MG/DL (ref 0.76–1.27)
DEPRECATED RDW RBC AUTO: 49.4 FL (ref 37–54)
EGFRCR SERPLBLD CKD-EPI 2021: 62.8 ML/MIN/1.73
ERYTHROCYTE [DISTWIDTH] IN BLOOD BY AUTOMATED COUNT: 15.9 % (ref 12.3–15.4)
GLUCOSE BLDC GLUCOMTR-MCNC: 125 MG/DL (ref 70–130)
GLUCOSE BLDC GLUCOMTR-MCNC: 172 MG/DL (ref 70–130)
GLUCOSE BLDC GLUCOMTR-MCNC: 93 MG/DL (ref 70–130)
GLUCOSE BLDC GLUCOMTR-MCNC: 97 MG/DL (ref 70–130)
GLUCOSE BLDC GLUCOMTR-MCNC: 98 MG/DL (ref 70–130)
GLUCOSE SERPL-MCNC: 103 MG/DL (ref 65–99)
HCT VFR BLD AUTO: 28.2 % (ref 37.5–51)
HGB BLD-MCNC: 8.8 G/DL (ref 13–17.7)
MAGNESIUM SERPL-MCNC: 2.1 MG/DL (ref 1.6–2.4)
MCH RBC QN AUTO: 26.8 PG (ref 26.6–33)
MCHC RBC AUTO-ENTMCNC: 31.2 G/DL (ref 31.5–35.7)
MCV RBC AUTO: 86 FL (ref 79–97)
PHOSPHATE SERPL-MCNC: 3.3 MG/DL (ref 2.5–4.5)
PLATELET # BLD AUTO: 288 10*3/MM3 (ref 140–450)
PMV BLD AUTO: 11.3 FL (ref 6–12)
POTASSIUM SERPL-SCNC: 4.5 MMOL/L (ref 3.5–5.2)
RBC # BLD AUTO: 3.28 10*6/MM3 (ref 4.14–5.8)
SODIUM SERPL-SCNC: 136 MMOL/L (ref 136–145)
WBC NRBC COR # BLD: 5.5 10*3/MM3 (ref 3.4–10.8)

## 2022-04-11 PROCEDURE — 85027 COMPLETE CBC AUTOMATED: CPT | Performed by: STUDENT IN AN ORGANIZED HEALTH CARE EDUCATION/TRAINING PROGRAM

## 2022-04-11 PROCEDURE — 25010000002 MIDAZOLAM PER 1 MG: Performed by: RADIOLOGY

## 2022-04-11 PROCEDURE — 63710000001 INSULIN LISPRO (HUMAN) PER 5 UNITS: Performed by: NURSE PRACTITIONER

## 2022-04-11 PROCEDURE — 82962 GLUCOSE BLOOD TEST: CPT

## 2022-04-11 PROCEDURE — 80048 BASIC METABOLIC PNL TOTAL CA: CPT | Performed by: STUDENT IN AN ORGANIZED HEALTH CARE EDUCATION/TRAINING PROGRAM

## 2022-04-11 PROCEDURE — 83735 ASSAY OF MAGNESIUM: CPT | Performed by: STUDENT IN AN ORGANIZED HEALTH CARE EDUCATION/TRAINING PROGRAM

## 2022-04-11 PROCEDURE — 0 LIDOCAINE 1 % SOLUTION: Performed by: RADIOLOGY

## 2022-04-11 PROCEDURE — 77012 CT SCAN FOR NEEDLE BIOPSY: CPT

## 2022-04-11 PROCEDURE — 87205 SMEAR GRAM STAIN: CPT | Performed by: ORTHOPAEDIC SURGERY

## 2022-04-11 PROCEDURE — 84100 ASSAY OF PHOSPHORUS: CPT | Performed by: STUDENT IN AN ORGANIZED HEALTH CARE EDUCATION/TRAINING PROGRAM

## 2022-04-11 PROCEDURE — 87070 CULTURE OTHR SPECIMN AEROBIC: CPT | Performed by: ORTHOPAEDIC SURGERY

## 2022-04-11 PROCEDURE — 25010000002 FENTANYL CITRATE (PF) 50 MCG/ML SOLUTION: Performed by: RADIOLOGY

## 2022-04-11 PROCEDURE — 0K9P3ZX DRAINAGE OF LEFT HIP MUSCLE, PERCUTANEOUS APPROACH, DIAGNOSTIC: ICD-10-PCS | Performed by: RADIOLOGY

## 2022-04-11 PROCEDURE — 36415 COLL VENOUS BLD VENIPUNCTURE: CPT | Performed by: STUDENT IN AN ORGANIZED HEALTH CARE EDUCATION/TRAINING PROGRAM

## 2022-04-11 RX ORDER — SODIUM CHLORIDE 9 MG/ML
INJECTION, SOLUTION INTRAVENOUS
Status: COMPLETED | OUTPATIENT
Start: 2022-04-11 | End: 2022-04-11

## 2022-04-11 RX ORDER — LIDOCAINE HYDROCHLORIDE 10 MG/ML
20 INJECTION, SOLUTION INFILTRATION; PERINEURAL ONCE
Status: COMPLETED | OUTPATIENT
Start: 2022-04-11 | End: 2022-04-11

## 2022-04-11 RX ORDER — MIDAZOLAM HYDROCHLORIDE 1 MG/ML
INJECTION INTRAMUSCULAR; INTRAVENOUS
Status: COMPLETED | OUTPATIENT
Start: 2022-04-11 | End: 2022-04-11

## 2022-04-11 RX ORDER — FENTANYL CITRATE 50 UG/ML
INJECTION, SOLUTION INTRAMUSCULAR; INTRAVENOUS
Status: COMPLETED | OUTPATIENT
Start: 2022-04-11 | End: 2022-04-11

## 2022-04-11 RX ADMIN — FAMOTIDINE 20 MG: 20 TABLET ORAL at 18:20

## 2022-04-11 RX ADMIN — SODIUM CHLORIDE 25 ML/HR: 9 INJECTION, SOLUTION INTRAVENOUS at 12:51

## 2022-04-11 RX ADMIN — MAGNESIUM OXIDE 400 MG (241.3 MG MAGNESIUM) TABLET 400 MG: TABLET at 20:59

## 2022-04-11 RX ADMIN — FAMOTIDINE 20 MG: 20 TABLET ORAL at 05:46

## 2022-04-11 RX ADMIN — MIDAZOLAM 1 MG: 1 INJECTION INTRAMUSCULAR; INTRAVENOUS at 12:58

## 2022-04-11 RX ADMIN — INSULIN GLARGINE-YFGN 15 UNITS: 100 INJECTION, SOLUTION SUBCUTANEOUS at 20:59

## 2022-04-11 RX ADMIN — DIGOXIN 62.5 MCG: 125 TABLET ORAL at 18:20

## 2022-04-11 RX ADMIN — INSULIN LISPRO 2 UNITS: 100 INJECTION, SOLUTION INTRAVENOUS; SUBCUTANEOUS at 18:20

## 2022-04-11 RX ADMIN — Medication 10 ML: at 20:59

## 2022-04-11 RX ADMIN — FENTANYL CITRATE 50 MCG: 50 INJECTION INTRAMUSCULAR; INTRAVENOUS at 12:59

## 2022-04-11 RX ADMIN — METOPROLOL SUCCINATE 12.5 MG: 25 TABLET, EXTENDED RELEASE ORAL at 10:26

## 2022-04-11 RX ADMIN — LIDOCAINE HYDROCHLORIDE 20 ML: 10 INJECTION, SOLUTION INFILTRATION; PERINEURAL at 13:00

## 2022-04-11 NOTE — NURSING NOTE
Call placed to A. 3.6 second pause on monitor. Pt asymptomatic. Denies any pain or SOA. Back in a-fib with controlled rate. VSS.     2100- [place] notified. NNO. If pauses continue- place another call.

## 2022-04-11 NOTE — PAYOR COMM NOTE
"Laura Xie (86 y.o. Male)     PLEASE SEE ATTACHED FOR CONTINUED STAY REVIEW.     REF#[id number]    PLEASE CALL   OR  [phone]    THANK YOU    NAVDEEP LIEBERMAN LPN CCP            Date of Birth   1935    Social Security Number       Address   [address] [place] [address]    Home Phone   [phone]    MRN   [id number]       Congregational   Zoroastrian    Marital Status                               Admission Date   4/5/22    Admission Type   Emergency    Admitting Provider   Bowen Coughlin MD    Attending Provider   Robb Baldwin MD    Department, Room/Bed   [address], [address]1       Discharge Date       Discharge Disposition       Discharge Destination                               Attending Provider: Robb Baldwin MD    Allergies: Other, Penicillins, Percocet [Oxycodone-acetaminophen]    Isolation: None   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)   Code Status: CPR   Advance Care Planning Activity    Ht: 182.9 cm (72\")   Wt: 68 kg (150 lb)    Admission Cmt: None   Principal Problem: Hematoma of iliopsoas muscle, left, initial encounter [S70.12XA]                 Active Insurance as of 4/5/2022     Primary Coverage     Payor Plan Insurance Group Employer/Plan Group    ANTHEM BLUE CROSS ANTH BLUE CROSS BLUE SHIELD PPO [id number]     Payor Plan Address Payor Plan Phone Number Payor Plan Fax Number Effective Dates    [address] [phone]  1/1/2022 - None Entered    [facility] [id number]       Subscriber Name Subscriber Birth Date Member ID       PATRICIA XIE 7/11/1955 [id number]           Secondary Coverage     Payor Plan Insurance Group Employer/Plan Group    MEDICARE MEDICARE A & B      Payor Plan Address Payor Plan Phone Number Payor Plan Fax Number Effective Dates    [address] [phone]  11/1/2002 - None Entered    [facility] [id number]       Subscriber Name Subscriber Birth Date Member ID       LAURA XIE " 1935 [id number]                 Emergency Contacts      (Rel.) Home Phone Work Phone Mobile Phone    Gladys Sequeira (Spouse) [phone] -- [phone]    RicardoBruce (Son) [phone] -- [phone]              Oxygen Therapy (last 2 days)     Date/Time SpO2 Device (Oxygen Therapy) Flow (L/min) Oxygen Concentration (%) ETCO2 (mmHg)    04/11/22 1330 100 room air -- -- --    04/11/22 1315 98 room air -- -- --    04/11/22 1308 100 room air -- -- --    04/11/22 13:05:13 100 -- -- -- --    04/11/22 13:01:20 100 -- -- -- --    04/11/22 12:53:41 96 -- -- -- --    04/11/22 1202 98 -- -- -- --    04/11/22 0700 97 room air -- -- --    04/10/22 2325 97 room air -- -- --    04/10/22 2012 -- room air -- -- --    04/10/22 1917 -- room air -- -- --    04/10/22 1830 -- room air -- -- --    04/10/22 1635 -- room air -- -- --    04/10/22 1439 97 room air -- -- --    04/10/22 1420 -- room air -- -- --    04/10/22 1235 -- room air -- -- --    04/10/22 1000 -- room air -- -- --    04/10/22 0805 -- room air -- -- --    04/10/22 0726 96 room air -- -- --    04/09/22 2355 97 room air -- -- --    04/09/22 2135 -- room air -- -- --    04/09/22 2006 98 room air -- -- --    04/09/22 1830 98 -- -- -- --    04/09/22 1458 99 room air -- -- --    04/09/22 1409 99 room air -- -- --    04/09/22 0700 99 room air -- -- --        Intake & Output (last 2 days)       04/09 0701  04/10 0700 04/10 0701  04/11 0700 04/11 0701 04/12 0700    P.O. 400 360     Blood 290      Total Intake(mL/kg) 690 (10.1) 360 (5.3)     Urine (mL/kg/hr)  300 (0.2)     Stool  0     Total Output  300     Net +690 +60            Urine Unmeasured Occurrence 3 x 3 x 1 x    Stool Unmeasured Occurrence 2 x 5 x 1 x        Lines, Drains & Airways     Active LDAs     Name Placement date Placement time Site Days    Peripheral IV 04/09/22 1620 Left Hand 04/09/22  1620  Hand  1                   Physician Progress Notes (last 48 hours)      Maxi Pham,  MD at 04/11/22 0732                   LOS: 5 days     Subjective :   Patient examined today.  Wife also at bedside.  No new events since yesterday although has mobilized with physical therapy some feels he is able to just barely lift his foot off of the floor which is an improvement versus previously.    Objective :    Vital signs in last 24 hours:  Vitals:    04/10/22 1238 04/10/22 1439 04/10/22 1917 04/10/22 2325   BP: 139/67 132/78 130/67 105/53   BP Location:  Left arm Right arm Left arm   Patient Position:  Lying Lying Lying   Pulse: 82 73 84 89   Resp:  18 18 18   Temp:  98.2 °F (36.8 °C) 98.4 °F (36.9 °C) 98 °F (36.7 °C)   TempSrc:  Oral Oral Oral   SpO2:  97%  97%   Weight:       Height:           PHYSICAL EXAM:  Patient is calm, in no acute distress, awake and oriented x 3.  LLE with decreased strength in hip flexion.  Graded at 1-2/5.  He will maintain passive hip flexion for approximately 5 seconds before dropping his leg back down.  This is an improvement from prior exam subjective report of improvement of anterior thigh numbness.        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  No significant change from yesterday.  Interventional radiology aspiration consult is still pending.  H&H from today still pending  Recommend continuing to hold any anticoagulation until hemoglobin remained stable for a longer period of time.    Still  "with plans for aspiration of hematoma      May continue to work with physical therapy with weightbearing as tolerated to the left lower extremity.    Maxi Pham MD    Date: 2022  Time: 07:32 EDT    Electronically signed by Maxi Pham MD at 22 0733     Robb Baldwin MD at 04/10/22 1143          DAILY PROGRESS NOTE  [facility]    Patient Identification:  Name: Francisco Sequeira  Age: 86 y.o.  Sex: male  :  1935  MRN: [id number]         Primary Care Physician: Rubin Hinkle APRN      Subjective  Patient with no new complaints.  Overall feeling well.  Notes there is been a slight improvement in the strength of his left lower extremity.    Objective:  General Appearance:  Comfortable, well-appearing, in no acute distress and not in pain.    Vital signs: (most recent): Blood pressure 139/67, pulse 81, temperature 97.9 °F (36.6 °C), temperature source Oral, resp. rate 16, height 182.9 cm (72\"), weight 68.5 kg (151 lb 0.2 oz), SpO2 96 %.    Lungs:  Normal effort and normal respiratory rate.  Breath sounds clear to auscultation.    Heart: Normal rate.  Irregular rhythm.  (Prosthetic click present.)  Extremities: There is no dependent edema.    Neurological: Patient is alert and oriented to person, place and time.    Skin:  Warm and dry.                Vital signs in last 24 hours:  Temp:  [97.4 °F (36.3 °C)-98.8 °F (37.1 °C)] 97.9 °F (36.6 °C)  Heart Rate:  [75-88] 81  Resp:  [16-18] 16  BP: (108-139)/(49-75) 139/67    Intake/Output:    Intake/Output Summary (Last 24 hours) at 4/10/2022 1143  Last data filed at 2022 1830  Gross per 24 hour   Intake 690 ml   Output --   Net 690 ml         Results from last 7 days   Lab Units 04/10/22  0939 22  0914 22  0151 22  1613 22  1234 22  2347 22  1653 22  0951 22  2304 22  0711   WBC 10*3/mm3 5.73 6.28 8.37  --  10.81*  --   --  7.40 7.51 6.36   HEMOGLOBIN g/dL 8.3* 6.7* 7.4* " 7.2* 8.1* 8.2* 7.4* 7.8*  7.8* 8.6* 9.1*   PLATELETS 10*3/mm3 262 234 238  --  270  --   --  213 242 201     Results from last 7 days   Lab Units 04/10/22  0939 04/09/22  0914 04/08/22  0151 04/07/22  1234 04/06/22  0951 04/06/22  0002 04/04/22  0711   SODIUM mmol/L 136 135* 139 138 138 138 139   POTASSIUM mmol/L 4.7 4.5 4.2 4.4 4.2 4.1 4.3   CHLORIDE mmol/L 100 100 101 100 101 100 107   CO2 mmol/L 27.0 28.0 30.0* 29.2* 32.0* 29.6* 26.7   BUN mg/dL 14 14 13 13 11 11 10   CREATININE mg/dL 1.32* 1.26 1.28* 1.19 1.14 1.09 0.96   GLUCOSE mg/dL 203* 220* 165* 206* 161* 184* 143*   Estimated Creatinine Clearance: 40.4 mL/min (A) (by C-G formula based on SCr of 1.32 mg/dL (H)).  Results from last 7 days   Lab Units 04/10/22  0939 04/09/22  0914 04/08/22  0151 04/07/22  1234 04/06/22  0951 04/06/22  0002 04/04/22  0711   CALCIUM mg/dL 8.2* 7.8* 8.0* 8.5* 8.1* 8.5* 8.2*   ALBUMIN g/dL  --   --   --   --   --  2.70*  --    MAGNESIUM mg/dL 2.4 2.2 2.0 2.0  --   --   --    PHOSPHORUS mg/dL 2.9 3.0 3.1 2.7  --   --   --      Results from last 7 days   Lab Units 04/06/22  0002   ALBUMIN g/dL 2.70*   BILIRUBIN mg/dL 0.6   ALK PHOS U/L 131*   AST (SGOT) U/L 14   ALT (SGPT) U/L 11       Assessment:    Hematoma of iliopsoas muscle, left, initial encounter: Secondary to anticoagulants.  Coumadin, Lovenox, aspirin all on hold.  Continue to monitor closely.  Orthopedic input appreciated.    Acute blood loss anemia: Secondary to above.  .  Good response to 1 unit packed RBCs.  Continue to monitor.    Atrial fibrillation (HCC): Rate controlled.  Anticoagulation on hold.    H/O heart valve replacement with mechanical valve: Anticoagulation on hold.    Hypertension:    Type 2 diabetes mellitus (HCC):  Blood sugar is high this morning but it is noted that he did not receive his Lantus dose last night.    Stage 3b chronic kidney disease (HCC)    Chronic anticoagulation    History of CVA (cerebrovascular accident)    S/P laparoscopic  cholecystectomy      Plan:  Please see above.  Possible IR drainage of hematoma tomorrow.    Robb Baldwin MD  4/10/2022  11:43 EDT      Electronically signed by Robb Baldwin MD at 04/10/22 1146     Maxi Pham MD at 04/10/22 1143                   LOS: 4 days     Subjective :   Patient seen and examined.  The patient is resting comfortably.  His wife is at bedside.  States that he does still have numbness in his left thigh.  Feels that maybe he has regained some strength though he is not sure.  He did receive transfusion yesterday.  Denies any significant feeling of shortness of breath or malaise.    Objective :    Vital signs in last 24 hours:  Vitals:    04/09/22 2006 04/09/22 2355 04/10/22 0726 04/10/22 0809   BP: 125/64 119/64 139/67 139/67   BP Location: Right arm Left arm Left arm    Patient Position: Lying Lying Lying    Pulse: 83 87 81 81   Resp: 18 18 16    Temp: 98.8 °F (37.1 °C) 97.7 °F (36.5 °C) 97.9 °F (36.6 °C)    TempSrc: Oral Oral Oral    SpO2: 98% 97% 96%    Weight:       Height:           PHYSICAL EXAM:  Patient is calm, in no acute distress, awake and oriented x 3.  LLE with decreased strength in hip flexion.  Graded at 1-2/5.  He will maintain passive hip flexion for approximately 5 seconds before dropping his leg back down.  This is an improvement from prior exam subjective report of improvement of anterior thigh numbness.  He states that sensation is equal and intact to the bilateral anterior thighs.      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  I advised both the patient and his wife that this is a good prognostic indicator.  I still would recommend attempting an aspiration of the hematoma to remove further pressure on the nerve.  We did discuss that this does obviously carry a risk of removing any tamponade that has occurred however this is now several days out from the injury and the likelihood should be quite low.  Recommend continuing to hold any anticoagulation until hemoglobin remained stable for a longer period of time.    Still with plans for aspiration of hematoma      May continue to work with physical therapy with weightbearing as tolerated to the left lower extremity.    Maxi Pham MD    Date: 4/10/2022  Time: 11:43 EDT    Electronically signed by Maxi Pham MD at 04/10/22 2176               Physical Therapy Notes (last 48 hours)      Angelica Pierson PT at 04/10/22 1614  Version 1 of 1       Goal Outcome Evaluation:  Plan of Care Reviewed With: patient        Progress: improving  Outcome Evaluation: Pt agreeable to PT session this afternoon. Denies pain in L hip today. Emili for bed mobility and demo's good sitting balance EOB. Pt performs STS to RW with Emili x2, VC/TC. Attempted to transfer to chair today, unsuccessful d/t pt's L knee buckling and pt needing to sit back on the bed. Pt agreeable to perform multiple STS today with practicing weight shift in standing. Pt req's cues for balance, when shifting to L side, leans anteriorly and req's heavier assist to maintain balance. Pt returns to supine at end of the session. Continues to benefit from skilled PT.    Electronically signed by Angelica Pierson PT at 04/10/22 1614     Angelica Pierson PT at 04/10/22 1615  Version 1 of 1         Patient Name: Francisco Sequeira  : 1935    MRN: [id number]                               Today's Date: 4/10/2022       Admit Date: 4/5/2022    Visit Dx:     ICD-10-CM ICD-9-CM   1. Hematoma of iliopsoas muscle, left, initial encounter  S70.12XA 924.00   2. Chronic anticoagulation  Z79.01 V58.61   3. H/O heart valve replacement with mechanical valve  Z95.2 V43.3   4. Immobility syndrome  M62.3 728.3   5. Anemia, unspecified type  D64.9 285.9   6. History of atrial fibrillation  Z86.79 V12.59     Patient Active Problem List   Diagnosis   • Atrial fibrillation (HCC)   • Hypertension   • Atopic rhinitis   • Gastroesophageal reflux disease   • Hyperlipidemia   • Type 2 diabetes mellitus (HCC)   • Low testosterone   • H/O heart valve replacement with mechanical valve   • Kidney carcinoma (HCC)   • Stage 3b chronic kidney disease (HCC)   • BYRON (acute kidney injury) (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)   • Calculus of gallbladder with acute cholecystitis without obstruction   • History of Clostridium difficile infection   • Aspiration pneumonitis (HCC)   • Hematoma of iliopsoas muscle, left, initial encounter   • History of CVA (cerebrovascular accident)   • S/P laparoscopic cholecystectomy   • Anemia     Past Medical History:   Diagnosis Date   • Allergic rhinitis    • Aortic valve insufficiency    • Ascending aortic aneurysm (HCC)    • Atrial fibrillation (HCC)    • Bacteremia    • Calcific aortic stenosis of bicuspid valve    • Cardiac arrest ([facility])    • Cardiomyopathy (HCC)    • CKD (chronic kidney disease) stage 3, GFR 30-59 ml/min (HCC)    • Contact dermatitis due to poison ivy    • Elbow fracture    • Esophageal reflux    • GERD (gastroesophageal reflux disease)    • Head injury    • History of transfusion    • Hyperglycemia    •  Hyperlipidemia    • Hypertension    • Kidney carcinoma (HCC)    • Nonischemic cardiomyopathy (HCC)    • Renal oncocytoma    • Seasonal allergic reaction    • Sinusitis    • Syncope    • Type 2 diabetes mellitus (HCC)     uncontrolled   • Visual field defect      Past Surgical History:   Procedure Laterality Date   • AORTIC VALVE REPAIR/REPLACEMENT     • ASCENDING AORTIC ANEURYSM REPAIR W/ MECHANICAL AORTIC VALVE REPLACEMENT     • CHOLECYSTECTOMY WITH INTRAOPERATIVE CHOLANGIOGRAM N/A 3/29/2022    Procedure: Laparoscopic cholecystectomy with cholangiogram, possible open;  Surgeon: Lisa Guidry MD;  Location: [facility] MAIN OR;  Service: General;  Laterality: N/A;   • COLONOSCOPY N/A 10/28/2021    Procedure: COLONOSCOPY to cecum:  cold snare polyps,;  Surgeon: Dinesh Meza MD;  Location: [facility] ENDOSCOPY;  Service: Gastroenterology;  Laterality: N/A;  pre:  Iron deficiency anemia  post:  polyps, diverticulosis,    • COLONOSCOPY N/A 11/9/2021    Procedure: COLONOSCOPY to cecum with APC cautery of AVM and clip placement x1;  Surgeon: Pavan Rodriguez MD;  Location: [facility] ENDOSCOPY;  Service: Gastroenterology;  Laterality: N/A;  PRE - gi bleed, anemia  POST - diverticulosis, poor prep, AVM right colon   • ENDOSCOPY N/A 10/28/2021    Procedure: ESOPHAGOGASTRODUODENOSCOPY with biopsies;  Surgeon: Dinesh Meza MD;  Location: [facility] ENDOSCOPY;  Service: Gastroenterology;  Laterality: N/A;  pre:  Iron deficiency anemia  post:  duodenitis and gastritis   • EYE SURGERY  12/09/2020    cataract surgery    • NEPHRECTOMY     • OTHER SURGICAL HISTORY      elbow surgery   • OTHER SURGICAL HISTORY      right arm surgery   • PROSTATE SURGERY     • THORACENTESIS Left     diagnostic      General Information     Row Name 04/10/22 1556          Physical Therapy Time and Intention    Document Type therapy note (daily note)  -DB     Mode of Treatment physical therapy;individual therapy  -DB     Row Name 04/10/22 1559           General Information    Patient Profile Reviewed yes  -DB           User Key  (r) = Recorded By, (t) = Taken By, (c) = Cosigned By    Initials Name Provider Type    DB Angelica Pierson PT Physical Therapist               Mobility     Row Name 04/10/22 1556          Bed Mobility    Bed Mobility supine-sit;sit-supine;scooting/bridging  -DB     Scooting/Bridging [facility] (Bed Mobility) dependent (less than 25% patient effort);2 person assist  -DB     Supine-Sit [facility] (Bed Mobility) minimum assist (75% patient effort);verbal cues  -DB     Sit-Supine [facility] (Bed Mobility) minimum assist (75% patient effort);verbal cues  -DB     Assistive Device (Bed Mobility) bed rails;head of bed elevated;draw sheet  -DB     Row Name 04/10/22 1556          Sit-Stand Transfer    Sit-Stand [facility] (Transfers) minimum assist (75% patient effort);verbal cues;2 person assist  -DB     Assistive Device (Sit-Stand Transfers) walker, front-wheeled  -DB     Comment, (Sit-Stand Transfer) performed 6x, able to stand for 1-2 min each rep; performed with weight shifting. attempted gait, pt's LLE buckled and sat back into bed. unable to take steps this date.  -DB     Row Name 04/10/22 1556          Gait/Stairs (Locomotion)    [facility] Level (Gait) unable to assess  -DB           User Key  (r) = Recorded By, (t) = Taken By, (c) = Cosigned By    Initials Name Provider Type    DB Angelica Pierson PT Physical Therapist               Obj/Interventions     Row Name 04/10/22 155          Balance    Balance Assessment sitting static balance;sitting dynamic balance;standing static balance  -DB     Static Sitting Balance supervision  -DB     Dynamic Sitting Balance supervision  -DB     Position, Sitting Balance sitting edge of bed;unsupported  -DB     Static Standing Balance contact guard  -DB     Position/Device Used, Standing Balance supported;walker, front-wheeled  -DB     Balance Interventions sitting;standing;sit to  stand;static;dynamic;weight shifting activity  -DB           User Key  (r) = Recorded By, (t) = Taken By, (c) = Cosigned By    Initials Name Provider Type    DB Angelica Pierson PT Physical Therapist               Goals/Plan    No documentation.                Clinical Impression     Row Name 04/10/22 1606          Pain    Pretreatment Pain Rating 0/10 - no pain  -DB     Posttreatment Pain Rating 0/10 - no pain  -DB     Pain Intervention(s) Ambulation/increased activity;Repositioned  -DB     Row Name 04/10/22 1606          Plan of Care Review    Plan of Care Reviewed With patient  -DB     Progress improving  -DB     Outcome Evaluation Pt agreeable to PT session this afternoon. Denies pain in L hip today. Emili for bed mobility and demo's good sitting balance EOB. Pt performs STS to RW with Emili x2, VC/TC. Attempted to transfer to chair today, unsuccessful d/t pt's L knee buckling and pt needing to sit back on the bed. Pt agreeable to perform multiple STS today with practicing weight shift in standing. Pt req's cues for balance, when shifting to L side, leans anteriorly and req's heavier assist to maintain balance. Pt returns to supine at end of the session. Continues to benefit from skilled PT.  -DB     Row Name 04/10/22 1606          Vital Signs    O2 Delivery Pre Treatment room air  -DB     O2 Delivery Intra Treatment room air  -DB     O2 Delivery Post Treatment room air  -DB     Pre Patient Position Supine  -DB     Intra Patient Position Standing  -DB     Post Patient Position Supine  -DB     Row Name 04/10/22 1606          Positioning and Restraints    Pre-Treatment Position in bed  -DB     Post Treatment Position bed  -DB     In Bed supine;call light within reach;encouraged to call for assist;exit alarm on;with family/caregiver  -DB           User Key  (r) = Recorded By, (t) = Taken By, (c) = Cosigned By    Initials Name Provider Type    Angelica Hackett, CICI Physical Therapist               Outcome Measures      Row Name 04/10/22 1614          How much help from another person do you currently need...    Turning from your back to your side while in flat bed without using bedrails? 3  -DB     Moving from lying on back to sitting on the side of a flat bed without bedrails? 3  -DB     Moving to and from a bed to a chair (including a wheelchair)? 2  -DB     Standing up from a chair using your arms (e.g., wheelchair, bedside chair)? 3  -DB     Climbing 3-5 steps with a railing? 1  -DB     To walk in hospital room? 1  -DB     AM-PAC 6 Clicks Score (PT) 13  -DB     Row Name 04/10/22 1614          Functional Assessment    Outcome Measure Options AM-PAC 6 Clicks Basic Mobility (PT)  -DB           User Key  (r) = Recorded By, (t) = Taken By, (c) = Cosigned By    Initials Name Provider Type    Angelica Hackett, PT Physical Therapist                             Physical Therapy Education                 Title: PT OT SLP Therapies (In Progress)     Topic: Physical Therapy (In Progress)     Point: Mobility training (In Progress)     Learning Progress Summary           Patient Acceptance, E, VU by DB at 4/10/2022 1614    Acceptance, E,TB, NR by MS at 4/7/2022 1535   Family Acceptance, E, VU by DB at 4/10/2022 1614   Significant Other Acceptance, E,TB, NR by MS at 4/7/2022 1535                   Point: Home exercise program (In Progress)     Learning Progress Summary           Patient Acceptance, E, VU by DB at 4/10/2022 1614    Acceptance, E,TB, NR by MS at 4/7/2022 1535   Family Acceptance, E, VU by DB at 4/10/2022 1614   Significant Other Acceptance, E,TB, NR by MS at 4/7/2022 1535                   Point: Body mechanics (In Progress)     Learning Progress Summary           Patient Acceptance, E, VU by DB at 4/10/2022 1614    Acceptance, E,TB, NR by MS at 4/7/2022 1535   Family Acceptance, E, VU by DB at 4/10/2022 1614   Significant Other Acceptance, E,TB, NR by MS at 4/7/2022 1535                   Point: Precautions (In  Progress)     Learning Progress Summary           Patient Acceptance, E, VU by DB at 4/10/2022 1614    Acceptance, E,TB, NR by MS at 4/7/2022 1535   Family Acceptance, E, VU by DB at 4/10/2022 1614   Significant Other Acceptance, E,TB, NR by MS at 4/7/2022 1535                               User Key     Initials Effective Dates Name Provider Type Discipline    MS 06/16/21 -  Goldie Abrams PT Physical Therapist PT    DB 06/16/21 -  Angelica Pierson PT Physical Therapist PT              PT Recommendation and Plan     Plan of Care Reviewed With: patient  Progress: improving  Outcome Evaluation: Pt agreeable to PT session this afternoon. Denies pain in L hip today. Emili for bed mobility and demo's good sitting balance EOB. Pt performs STS to RW with Emili x2, VC/TC. Attempted to transfer to chair today, unsuccessful d/t pt's L knee buckling and pt needing to sit back on the bed. Pt agreeable to perform multiple STS today with practicing weight shift in standing. Pt req's cues for balance, when shifting to L side, leans anteriorly and req's heavier assist to maintain balance. Pt returns to supine at end of the session. Continues to benefit from skilled PT.     Time Calculation:    PT Charges     Row Name 04/10/22 1614             Time Calculation    Start Time 1453  -DB      Stop Time 1516  -DB      Time Calculation (min) 23 min  -DB      PT Received On 04/10/22  -DB      PT - Next Appointment 04/11/22  -DB              Time Calculation- PT    Total Timed Code Minutes- PT 23 minute(s)  -DB            User Key  (r) = Recorded By, (t) = Taken By, (c) = Cosigned By    Initials Name Provider Type    DB Angelica Pierson, CICI Physical Therapist              Therapy Charges for Today     Code Description Service Date Service Provider Modifiers Qty    13266530745 HC PT THERAPEUTIC ACT EA 15 MIN 4/10/2022 Angelica Pierson PT GP 2          PT G-Codes  Outcome Measure Options: AM-PAC 6 Clicks Basic Mobility (PT)  AM-PAC 6  Clicks Score (PT): 13    Angelica Pierson, PT  4/10/2022      Electronically signed by Angelica Pierson, PT at 04/10/22 8239

## 2022-04-11 NOTE — CASE MANAGEMENT/SOCIAL WORK
Discharge Planning Assessment  [facility]     Patient Name: Francisco Sequeira  MRN: [id number]  Today's Date: 4/11/2022    Admit Date: 4/5/2022     Discharge Needs Assessment     Row Name 04/11/22 1329       Living Environment    People in Home spouse    Name(s) of People in Home Gladys Sequeira wife [phone]    Current Living Arrangements home    Primary Care Provided by self;spouse/significant other    Provides Primary Care For no one    Family Caregiver if Needed spouse    Family Caregiver Names Gladys Sequeira wife [phone]    Quality of Family Relationships supportive;helpful    Able to Return to Prior Arrangements --  Pending clinical course       Resource/Environmental Concerns    Resource/Environmental Concerns none    Transportation Concerns none       Transition Planning    Patient/Family Anticipates Transition to home with family;inpatient rehabilitation facility    Patient/Family Anticipated Services at Transition home health care;skilled nursing    Transportation Anticipated family or friend will provide       Discharge Needs Assessment    Readmission Within the Last 30 Days other (see comments)    Current Outpatient/Agency/Support Group homecare agency    Equipment Currently Used at Home cane, straight;walker, rolling    Concerns to be Addressed discharge planning    Provided Post Acute Provider List? Yes    Post Acute Provider List Inpatient Rehab    Provided Post Acute Provider Quality & Resource List? Yes    Delivered To Support Person    Support Person Gladys Sequeira wife [phone]    Method of Delivery In person    Current Discharge Risk chronically ill               Discharge Plan     Row Name 04/11/22 1333       Plan    Plan Home with HH vs SNF.    Patient/Family in Agreement with Plan yes    Plan Comments IMM 4/6/2022. Spoke with Gladys Sequeira wife [phone]. Face sheet verified. Prior to admission patient as at home with his wife. She did assist him with some aspects of his ADL’s. He has a cane and  walker. Patient uses the [facility] on [address], denies any issues affording or remembering to take his medications. Gladys Sequeira wife [phone] is patient’s POA.  Patient is current with [facility] referral made. Plan is to return home, but may need short term rehab.  Wife and patient will review provider list and get back with CCP. Family will be able to transport. Will continue to monitor for new or changing discharge needs.  Urmila MONTENEGRO RN CCP              Continued Care and Services - Admitted Since 4/5/2022     Home Medical Care     Service Provider Request Status Selected Services Address Phone Fax Patient Preferred     [facility]  Pending - Request Sent N/A [address] [address] [phone] --            Selected Continued Care - Prior Encounters Includes selections from prior encounters from 1/5/2022 to 4/11/2022    Discharged on 3/2/2022 Admission date: 2/14/2022 - Discharge disposition: Home-Health Care [facility]    Home Medical Care     Service Provider Selected Services Address Phone Fax Patient Preferred     [facility]  [facility] [address] [address] [phone] --                    Expected Discharge Date and Time     Expected Discharge Date Expected Discharge Time    Apr 11, 2022          Demographic Summary     Row Name 04/11/22 1328       General Information    Admission Type inpatient    Arrived From emergency department    Required Notices Provided Important Message from Medicare    Referral Source admission list    Reason for Consult discharge planning    Preferred Language English       Contact Information    Permission Granted to Share Info With family/designee  Gladys Sequeira wife [phone]               Functional Status     Row Name 04/11/22 1328       Functional Status    Usual Activity Tolerance moderate    Current Activity Tolerance moderate       Functional Status,  IADL    Medications independent    Meal Preparation assistive person    Housekeeping assistive person    Laundry assistive person    Shopping assistive person       Mental Status Summary    Recent Changes in Mental Status/Cognitive Functioning unable to assess       Employment/    Employment Status retired               Psychosocial    No documentation.                Abuse/Neglect    No documentation.                Legal    No documentation.                Substance Abuse    No documentation.                Patient Forms    No documentation.                   Urmila Nolasco RN

## 2022-04-11 NOTE — DISCHARGE PLACEMENT REQUEST
"Laura Xie (86 y.o. Male)             Date of Birth   1935    Social Security Number       Address   [address] [place] [address]    Home Phone   [phone]    MRN   [id number]       Cheondoism   Yazidism    Marital Status                               Admission Date   4/5/22    Admission Type   Emergency    Admitting Provider   Bowen Coughlin MD    Attending Provider   Robb Baldwin MD    Department, Room/Bed   [address], [address]       Discharge Date       Discharge Disposition       Discharge Destination                               Attending Provider: Robb Baldwin MD    Allergies: Other, Penicillins, Percocet [Oxycodone-acetaminophen]    Isolation: None   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)   Code Status: CPR   Advance Care Planning Activity    Ht: 182.9 cm (72\")   Wt: 68 kg (150 lb)    Admission Cmt: None   Principal Problem: Hematoma of iliopsoas muscle, left, initial encounter [S70.12XA]                 Active Insurance as of 4/5/2022     Primary Coverage     Payor Plan Insurance Group Employer/Plan Group    ANTH [employer] CR[employer] BLUE SHIELD PPO [id number]     Payor Plan Address Payor Plan Phone Number Payor Plan Fax Number Effective Dates    [address] [phone]  1/1/2022 - None Entered    [facility] [id number]       Subscriber Name Subscriber Birth Date Member ID       PATRICIA XIE 7/11/1955 [id number]           Secondary Coverage     Payor Plan Insurance Group Employer/Plan Group    MEDICARE MEDICARE A & B      Payor Plan Address Payor Plan Phone Number Payor Plan Fax Number Effective Dates    [address] [phone]  11/1/2002 - None Entered    [facility] [id number]       Subscriber Name Subscriber Birth Date Member ID       LAURA XIE 1935 [id number]                 Emergency Contacts      (Rel.) Home Phone Work Phone Mobile Phone    Patricia Xie (Spouse) [phone] -- " [phone]    Bruce Silva (Son) [phone] -- [phone]

## 2022-04-11 NOTE — PLAN OF CARE
Goal Outcome Evaluation:  Plan of Care Reviewed With: patient        Progress: no change  A&OX4-able to make needs known. VSS. A-fib on monitor. 3.6 second pause x1. RA. SCD on. States pain to LLE is better this evening and ROM has slightly improved. Accumax on bed. Z-guard to bottom. Q2 hour turn. Bed in low position. Call light in reach. Bed alarm on.

## 2022-04-11 NOTE — PROGRESS NOTES
LOS: 5 days     Subjective :   Patient examined today.  Wife also at bedside.  No new events since yesterday although has mobilized with physical therapy some feels he is able to just barely lift his foot off of the floor which is an improvement versus previously.    Objective :    Vital signs in last 24 hours:  Vitals:    04/10/22 1238 04/10/22 1439 04/10/22 1917 04/10/22 2325   BP: 139/67 132/78 130/67 105/53   BP Location:  Left arm Right arm Left arm   Patient Position:  Lying Lying Lying   Pulse: 82 73 84 89   Resp:  18 18 18   Temp:  98.2 °F (36.8 °C) 98.4 °F (36.9 °C) 98 °F (36.7 °C)   TempSrc:  Oral Oral Oral   SpO2:  97%  97%   Weight:       Height:           PHYSICAL EXAM:  Patient is calm, in no acute distress, awake and oriented x 3.  LLE with decreased strength in hip flexion.  Graded at 1-2/5.  He will maintain passive hip flexion for approximately 5 seconds before dropping his leg back down.  This is an improvement from prior exam subjective report of improvement of anterior thigh numbness.        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  No significant change from yesterday.  Interventional radiology aspiration consult is still pending.  H&H from today still pending  Recommend continuing to hold any anticoagulation until hemoglobin remained stable for a longer period of time.    Still with plans for aspiration of  hematoma      May continue to work with physical therapy with weightbearing as tolerated to the left lower extremity.    Maxi Pham MD    Date: 4/11/2022  Time: 07:32 EDT

## 2022-04-11 NOTE — PROGRESS NOTES
"DAILY PROGRESS NOTE  [facility]    Patient Identification:  Name: Francisco Sequeira  Age: 86 y.o.  Sex: male  :  1935  MRN: [id number]         Primary Care Physician: Rubin Hinkle APRN      Subjective  Patient with no acute complaints.  Feels about the same overall.    Objective:  General Appearance:  Comfortable, in no acute distress and not in pain (Frail-appearing).    Vital signs: (most recent): Blood pressure 112/68, pulse 81, temperature 97.7 °F (36.5 °C), temperature source Oral, resp. rate 17, height 182.9 cm (72\"), weight 68 kg (150 lb), SpO2 100 %.    Lungs:  Normal effort and normal respiratory rate.  Breath sounds clear to auscultation.    Heart: Normal rate.  Regular rhythm.  (Prosthetic click)  Extremities: There is no dependent edema.    Neurological: Patient is alert and oriented to person, place and time.    Skin:  Warm and dry.                Vital signs in last 24 hours:  Temp:  [97.7 °F (36.5 °C)-98.4 °F (36.9 °C)] 97.7 °F (36.5 °C)  Heart Rate:  [78-91] 81  Resp:  [16-18] 17  BP: (105-152)/(53-90) 112/68    Intake/Output:    Intake/Output Summary (Last 24 hours) at 2022 1827  Last data filed at 2022 1700  Gross per 24 hour   Intake 880 ml   Output 300 ml   Net 580 ml         Results from last 7 days   Lab Units 22  1123 04/10/22  0939 22  0914 22  0151 22  1613 22  1234 22  2347 22  1653 22  0951 22  2304   WBC 10*3/mm3 5.50 5.73 6.28 8.37  --  10.81*  --   --  7.40 7.51   HEMOGLOBIN g/dL 8.8* 8.3* 6.7* 7.4* 7.2* 8.1* 8.2*   < > 7.8*  7.8* 8.6*   PLATELETS 10*3/mm3 288 262 234 238  --  270  --   --  213 242    < > = values in this interval not displayed.     Results from last 7 days   Lab Units 22  1123 04/10/22  0939 22  0914 22  0151 22  1234 22  0951 22  0002   SODIUM mmol/L 136 136 135* 139 138 138 138   POTASSIUM mmol/L 4.5 4.7 4.5 4.2 4.4 4.2 4.1   CHLORIDE mmol/L " 103 100 100 101 100 101 100   CO2 mmol/L 26.0 27.0 28.0 30.0* 29.2* 32.0* 29.6*   BUN mg/dL 13 14 14 13 13 11 11   CREATININE mg/dL 1.15 1.32* 1.26 1.28* 1.19 1.14 1.09   GLUCOSE mg/dL 103* 203* 220* 165* 206* 161* 184*   Estimated Creatinine Clearance: 46 mL/min (by C-G formula based on SCr of 1.15 mg/dL).  Results from last 7 days   Lab Units 04/11/22  1123 04/10/22  0939 04/09/22  0914 04/08/22  0151 04/07/22  1234 04/06/22  0951 04/06/22  0002   CALCIUM mg/dL 8.0* 8.2* 7.8* 8.0* 8.5* 8.1* 8.5*   ALBUMIN g/dL  --   --   --   --   --   --  2.70*   MAGNESIUM mg/dL 2.1 2.4 2.2 2.0 2.0  --   --    PHOSPHORUS mg/dL 3.3 2.9 3.0 3.1 2.7  --   --      Results from last 7 days   Lab Units 04/06/22  0002   ALBUMIN g/dL 2.70*   BILIRUBIN mg/dL 0.6   ALK PHOS U/L 131*   AST (SGOT) U/L 14   ALT (SGPT) U/L 11       Assessment:  Hematoma of iliopsoas muscle, left, initial encounter: Secondary to anticoagulants.  Coumadin, Lovenox, aspirin all on hold.  Continue to monitor closely.  Orthopedic input appreciated.  Status post IR aspiration.  Only 1 cc aspirated.    Acute blood loss anemia: Secondary to above.  .  Good response to 1 unit packed RBCs.    Presently remaining stable, slowly improving..    Atrial fibrillation (HCC): Rate controlled.  Anticoagulation on hold.    H/O heart valve replacement with mechanical valve: Anticoagulation on hold.    Hypertension:    Type 2 diabetes mellitus (HCC):  Blood sugar is high this morning but it is noted that he did not receive his Lantus dose last night.    Stage 3b chronic kidney disease (HCC)    Chronic anticoagulation    History of CVA (cerebrovascular accident)    S/P laparoscopic cholecystectomy      Plan:  Please see above.    Robb Baldwin MD  4/11/2022  18:27 EDT

## 2022-04-11 NOTE — NURSING NOTE
Patient arrived at bay 10 xray triage via stretcher for a biopsy. Protective goggles and mask in place with all patient interactions today.

## 2022-04-12 LAB
ANION GAP SERPL CALCULATED.3IONS-SCNC: 7.3 MMOL/L (ref 5–15)
BUN SERPL-MCNC: 16 MG/DL (ref 8–23)
BUN/CREAT SERPL: 13.4 (ref 7–25)
CALCIUM SPEC-SCNC: 8.3 MG/DL (ref 8.6–10.5)
CHLORIDE SERPL-SCNC: 102 MMOL/L (ref 98–107)
CO2 SERPL-SCNC: 24.7 MMOL/L (ref 22–29)
CREAT SERPL-MCNC: 1.19 MG/DL (ref 0.76–1.27)
DEPRECATED RDW RBC AUTO: 49.5 FL (ref 37–54)
EGFRCR SERPLBLD CKD-EPI 2021: 60.2 ML/MIN/1.73
ERYTHROCYTE [DISTWIDTH] IN BLOOD BY AUTOMATED COUNT: 16.1 % (ref 12.3–15.4)
GLUCOSE BLDC GLUCOMTR-MCNC: 108 MG/DL (ref 70–130)
GLUCOSE BLDC GLUCOMTR-MCNC: 143 MG/DL (ref 70–130)
GLUCOSE BLDC GLUCOMTR-MCNC: 154 MG/DL (ref 70–130)
GLUCOSE BLDC GLUCOMTR-MCNC: 33 MG/DL (ref 70–130)
GLUCOSE BLDC GLUCOMTR-MCNC: 37 MG/DL (ref 70–130)
GLUCOSE BLDC GLUCOMTR-MCNC: 92 MG/DL (ref 70–130)
GLUCOSE SERPL-MCNC: 159 MG/DL (ref 65–99)
HCT VFR BLD AUTO: 28.5 % (ref 37.5–51)
HGB BLD-MCNC: 8.7 G/DL (ref 13–17.7)
MAGNESIUM SERPL-MCNC: 2.2 MG/DL (ref 1.6–2.4)
MCH RBC QN AUTO: 26.1 PG (ref 26.6–33)
MCHC RBC AUTO-ENTMCNC: 30.5 G/DL (ref 31.5–35.7)
MCV RBC AUTO: 85.6 FL (ref 79–97)
PHOSPHATE SERPL-MCNC: 3.1 MG/DL (ref 2.5–4.5)
PLATELET # BLD AUTO: 273 10*3/MM3 (ref 140–450)
PMV BLD AUTO: 10.9 FL (ref 6–12)
POTASSIUM SERPL-SCNC: 4.6 MMOL/L (ref 3.5–5.2)
RBC # BLD AUTO: 3.33 10*6/MM3 (ref 4.14–5.8)
SODIUM SERPL-SCNC: 134 MMOL/L (ref 136–145)
WBC NRBC COR # BLD: 5.56 10*3/MM3 (ref 3.4–10.8)

## 2022-04-12 PROCEDURE — 97530 THERAPEUTIC ACTIVITIES: CPT

## 2022-04-12 PROCEDURE — 84100 ASSAY OF PHOSPHORUS: CPT | Performed by: STUDENT IN AN ORGANIZED HEALTH CARE EDUCATION/TRAINING PROGRAM

## 2022-04-12 PROCEDURE — 80048 BASIC METABOLIC PNL TOTAL CA: CPT | Performed by: STUDENT IN AN ORGANIZED HEALTH CARE EDUCATION/TRAINING PROGRAM

## 2022-04-12 PROCEDURE — 63710000001 INSULIN LISPRO (HUMAN) PER 5 UNITS: Performed by: NURSE PRACTITIONER

## 2022-04-12 PROCEDURE — 85027 COMPLETE CBC AUTOMATED: CPT | Performed by: STUDENT IN AN ORGANIZED HEALTH CARE EDUCATION/TRAINING PROGRAM

## 2022-04-12 PROCEDURE — 83735 ASSAY OF MAGNESIUM: CPT | Performed by: STUDENT IN AN ORGANIZED HEALTH CARE EDUCATION/TRAINING PROGRAM

## 2022-04-12 PROCEDURE — 82962 GLUCOSE BLOOD TEST: CPT

## 2022-04-12 PROCEDURE — 36415 COLL VENOUS BLD VENIPUNCTURE: CPT | Performed by: STUDENT IN AN ORGANIZED HEALTH CARE EDUCATION/TRAINING PROGRAM

## 2022-04-12 PROCEDURE — 97166 OT EVAL MOD COMPLEX 45 MIN: CPT

## 2022-04-12 PROCEDURE — 25010000002 ENOXAPARIN PER 10 MG: Performed by: HOSPITALIST

## 2022-04-12 RX ADMIN — FERROUS SULFATE TAB 325 MG (65 MG ELEMENTAL FE) 325 MG: 325 (65 FE) TAB at 08:17

## 2022-04-12 RX ADMIN — Medication 1 CAPSULE: at 08:17

## 2022-04-12 RX ADMIN — Medication 10 ML: at 08:17

## 2022-04-12 RX ADMIN — FAMOTIDINE 20 MG: 20 TABLET ORAL at 08:17

## 2022-04-12 RX ADMIN — FAMOTIDINE 20 MG: 20 TABLET ORAL at 16:27

## 2022-04-12 RX ADMIN — ATORVASTATIN CALCIUM 40 MG: 20 TABLET, FILM COATED ORAL at 08:17

## 2022-04-12 RX ADMIN — VANCOMYCIN HYDROCHLORIDE 125 MG: 125 CAPSULE ORAL at 08:17

## 2022-04-12 RX ADMIN — DIGOXIN 62.5 MCG: 125 TABLET ORAL at 11:59

## 2022-04-12 RX ADMIN — Medication 10 ML: at 21:50

## 2022-04-12 RX ADMIN — ENOXAPARIN SODIUM 70 MG: 100 INJECTION SUBCUTANEOUS at 21:50

## 2022-04-12 RX ADMIN — INSULIN LISPRO 2 UNITS: 100 INJECTION, SOLUTION INTRAVENOUS; SUBCUTANEOUS at 12:00

## 2022-04-12 RX ADMIN — MAGNESIUM OXIDE 400 MG (241.3 MG MAGNESIUM) TABLET 400 MG: TABLET at 21:49

## 2022-04-12 RX ADMIN — MAGNESIUM OXIDE 400 MG (241.3 MG MAGNESIUM) TABLET 400 MG: TABLET at 08:17

## 2022-04-12 RX ADMIN — DEXTROSE MONOHYDRATE 12.5 G: 25 INJECTION, SOLUTION INTRAVENOUS at 06:05

## 2022-04-12 RX ADMIN — METOPROLOL SUCCINATE 12.5 MG: 25 TABLET, EXTENDED RELEASE ORAL at 08:20

## 2022-04-12 RX ADMIN — INSULIN GLARGINE-YFGN 15 UNITS: 100 INJECTION, SOLUTION SUBCUTANEOUS at 21:51

## 2022-04-12 NOTE — PROGRESS NOTES
"[facility] Clinical Pharmacy Services: Enoxaparin Consult    Francisco BRENNAN Sequeira has a pharmacy consult to dose full-dose enoxaparin per Dr. Robb Baldwin's request.     Indication: Atrial Fibrillation - requiring full anticoagulation, Mechanical valve  Home Anticoagulation: per pre admission orders Warfarin 7.5 mg daily+ Lovenox bridging until INR therapeutic  Relevant clinical data and objective history reviewed:  86 y.o. male 182.9 cm (72\") 68 kg (150 lb)   Body mass index is 20.34 kg/m².   Results from last 7 days   Lab Units 04/12/22  1157   PLATELETS 10*3/mm3 273     Estimated Creatinine Clearance: 44.4 mL/min (by C-G formula based on SCr of 1.19 mg/dL).    Assessment/Plan     Resuming Lovenox, if pt tolerates MD plans to resume Warfarin and Aspirin. Will start patient on  70 mg (1mg/kg) subcutaneous every 12 hours, adjusted for renal function. Consult order will be discontinued but pharmacy will continue to follow.     Roberto Foster [facility]  Clinical Pharmacist   "

## 2022-04-12 NOTE — CASE MANAGEMENT/SOCIAL WORK
Continued Stay Note  [facility]     Patient Name: Francisco Sequeira  MRN: [id number]  Today's Date: 4/12/2022    Admit Date: 4/5/2022     Discharge Plan     Row Name 04/12/22 1512       Plan    Plan SNF pending accepting facility or Home with Home Health    Plan Comments Met with patient and wife at bedside. They are requesting referrals to [facility] and [facility] - Spoke with Amirah she will follow. Informed patient and wife to continue to review for other options as it can be challenging to be placed at [place].  Will continue to monitor for new or changing discharge needs.  Urmila MONTENEGRO RN CCP               Discharge Codes    No documentation.               Expected Discharge Date and Time     Expected Discharge Date Expected Discharge Time    Apr 15, 2022             Urmila Nolasco RN

## 2022-04-12 NOTE — PLAN OF CARE
Problem: Adult Inpatient Plan of Care  Goal: Plan of Care Review  4/12/2022 1705 by Melinda Liriano, RN  Flowsheets (Taken 4/12/2022 1705)  Outcome Evaluation: VSS< waffleoverlay on Bed, Stood w/ assist x2 w/ walker . WBAT, no c/o pain , Takes pill w/ applesauce. wife at bedside  4/12/2022 1415 by Melinda Liriano, RN  Outcome: Ongoing, Progressing   Goal Outcome Evaluation:              Outcome Evaluation: VSS< waffleoverlay on Bed, Stood w/ assist x2 w/ walker . WBAT, no c/o pain , Takes pill w/ applesauce. wife at bedside

## 2022-04-12 NOTE — THERAPY TREATMENT NOTE
Patient Name: Francisco Sequeira  : 1935    MRN: [id number]                              Today's Date: 2022       Admit Date: 2022    Visit Dx:     ICD-10-CM ICD-9-CM   1. Hematoma of iliopsoas muscle, left, initial encounter  S70.12XA 924.00   2. Chronic anticoagulation  Z79.01 V58.61   3. H/O heart valve replacement with mechanical valve  Z95.2 V43.3   4. Immobility syndrome  M62.3 728.3   5. Anemia, unspecified type  D64.9 285.9   6. 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)   • Iron deficiency anemia   • Chronic anticoagulation   • Hemiparesis of left nondominant side as late effect of cerebral infarction (HCC)   • Rectus sheath hematoma   • Sepsis (HCC)   • Calculus of gallbladder with acute cholecystitis without obstruction   • History of Clostridium difficile infection   • Aspiration pneumonitis (HCC)   • Hematoma of iliopsoas muscle, left, initial encounter   • History of CVA (cerebrovascular accident)   • S/P laparoscopic cholecystectomy   • Anemia     Past Medical History:   Diagnosis Date   • Allergic rhinitis    • Aortic valve insufficiency    • Ascending aortic aneurysm (HCC)    • Atrial fibrillation (HCC)    • Bacteremia    • Calcific aortic stenosis of bicuspid valve    • Cardiac arrest (HCC)    • Cardiomyopathy (HCC)    • CKD (chronic kidney disease) stage 3, GFR 30-59 ml/min (HCC)    • Contact dermatitis due to poison ivy    • Elbow fracture    • Esophageal reflux    • GERD (gastroesophageal reflux disease)    • Head  injury    • History of transfusion    • Hyperglycemia    • Hyperlipidemia    • Hypertension    • Kidney carcinoma (HCC)    • Nonischemic cardiomyopathy (HCC)    • Renal oncocytoma    • Seasonal allergic reaction    • Sinusitis    • Syncope    • Type 2 diabetes mellitus (HCC)     uncontrolled   • Visual field defect      Past Surgical History:   Procedure Laterality Date   • AORTIC VALVE REPAIR/REPLACEMENT     • ASCENDING AORTIC ANEURYSM REPAIR W/ MECHANICAL AORTIC VALVE REPLACEMENT     • CHOLECYSTECTOMY WITH INTRAOPERATIVE CHOLANGIOGRAM N/A 3/29/2022    Procedure: Laparoscopic cholecystectomy with cholangiogram, possible open;  Surgeon: Lisa Guidry MD;  Location: [facility] MAIN OR;  Service: General;  Laterality: N/A;   • COLONOSCOPY N/A 10/28/2021    Procedure: COLONOSCOPY to cecum:  cold snare polyps,;  Surgeon: Dinesh Meza MD;  Location: [facility] ENDOSCOPY;  Service: Gastroenterology;  Laterality: N/A;  pre:  Iron deficiency anemia  post:  polyps, diverticulosis,    • COLONOSCOPY N/A 11/9/2021    Procedure: COLONOSCOPY to cecum with APC cautery of AVM and clip placement x1;  Surgeon: Pavan Rodriguez MD;  Location: [facility] ENDOSCOPY;  Service: Gastroenterology;  Laterality: N/A;  PRE - gi bleed, anemia  POST - diverticulosis, poor prep, AVM right colon   • ENDOSCOPY N/A 10/28/2021    Procedure: ESOPHAGOGASTRODUODENOSCOPY with biopsies;  Surgeon: Dinesh Meza MD;  Location: [facility] ENDOSCOPY;  Service: Gastroenterology;  Laterality: N/A;  pre:  Iron deficiency anemia  post:  duodenitis and gastritis   • EYE SURGERY  12/09/2020    cataract surgery    • NEPHRECTOMY     • OTHER SURGICAL HISTORY      elbow surgery   • OTHER SURGICAL HISTORY      right arm surgery   • PROSTATE SURGERY     • THORACENTESIS Left     diagnostic      General Information     Row Name 04/12/22 8792          Physical Therapy Time and Intention    Document Type therapy note (daily note)  -CF     Mode of Treatment physical  therapy;co-treatment  -CF     Row Name 04/12/22 1342          General Information    Patient Profile Reviewed yes  -CF     Existing Precautions/Restrictions fall  -CF     Row Name 04/12/22 1342          Cognition    Orientation Status (Cognition) oriented x 3  -CF     Row Name 04/12/22 1342          Safety Issues, Functional Mobility    Safety Issues Affecting Function (Mobility) insight into deficits/self-awareness  -CF     Impairments Affecting Function (Mobility) balance;postural/trunk control;strength;sensation/sensory awareness;range of motion (ROM);pain;motor control;endurance/activity tolerance  -CF           User Key  (r) = Recorded By, (t) = Taken By, (c) = Cosigned By    Initials Name Provider Type    CF Rhoda Antunez PT Physical Therapist               Mobility     Row Name 04/12/22 1342          Bed Mobility    Bed Mobility supine-sit;sit-supine  -CF     Supine-Sit [place] (Bed Mobility) minimum assist (75% patient effort);verbal cues  -CF     Sit-Supine [place] (Bed Mobility) minimum assist (75% patient effort);verbal cues  -CF     Comment, (Bed Mobility) assist at LLE only for bed mobility  -CF     Row Name 04/12/22 1342          Bed-Chair Transfer    Bed-Chair [place] (Transfers) moderate assist (50% patient effort);2 person assist;verbal cues;nonverbal cues (demo/gesture)  -CF     Comment, (Bed-Chair Transfer) L knee blocked due to buckling. Did not lean in chair as potentially unsafe t/f back to bed with nursing staff due to buckling  -CF     Row Name 04/12/22 1342          Sit-Stand Transfer    Sit-Stand [place] (Transfers) minimum assist (75% patient effort);verbal cues;2 person assist  -CF     Assistive Device (Sit-Stand Transfers) other (see comments)  hha x2  -CF     Comment, (Sit-Stand Transfer) L knee blocked  -CF     Row Name 04/12/22 1342          Gait/Stairs (Locomotion)    [place] Level (Gait) unable to assess  -CF           User Key  (r) = Recorded By, (t) =  Taken By, (c) = Cosigned By    Initials Name Provider Type    CF Rhoda Antunez, CICI Physical Therapist               Obj/Interventions     Row Name 04/12/22 1343          Balance    Static Standing Balance minimal assist;2-person assist  -CF     Dynamic Standing Balance moderate assist;2-person assist;verbal cues  -CF     Comment, Balance Worked on standing weight shifts R and L with L knee blocked  -CF           User Key  (r) = Recorded By, (t) = Taken By, (c) = Cosigned By    Initials Name Provider Type    CF Rhoda Antunez PT Physical Therapist               Goals/Plan    No documentation.                Clinical Impression     Row Name 04/12/22 1344          Pain    Pretreatment Pain Rating 0/10 - no pain  -CF     Posttreatment Pain Rating 0/10 - no pain  -CF     Row Name 04/12/22 1344          Plan of Care Review    Plan of Care Reviewed With patient;spouse  -CF     Outcome Evaluation Pt tolerated treatment well this AM. Per ortho notes, concern for femoral nerve palsy and is WBAT with therapy. Pt unable to actively perform hip flexion or knee extension upon asssessment today. He stood multiple times with min Ax2 with L knee blocked. Practiced transfer bed<>chair with mod A x2 with L knee block. Pt left supine in bed with all known needs met. PT will continue to follow and progress as able.  -CF     Row Name 04/12/22 1344          Vital Signs    O2 Delivery Pre Treatment room air  -CF     O2 Delivery Intra Treatment room air  -CF     O2 Delivery Post Treatment room air  -CF     Row Name 04/12/22 1344          Positioning and Restraints    Pre-Treatment Position in bed  -CF     Post Treatment Position bed  -CF     In Bed notified nsg;call light within reach;encouraged to call for assist;exit alarm on;fowlers  -           User Key  (r) = Recorded By, (t) = Taken By, (c) = Cosigned By    Initials Name Provider Type    Rhoda Dior PT Physical Therapist               Outcome Measures     Row Name  04/12/22 1349          How much help from another person do you currently need...    Turning from your back to your side while in flat bed without using bedrails? 3  -CF     Moving from lying on back to sitting on the side of a flat bed without bedrails? 3  -CF     Moving to and from a bed to a chair (including a wheelchair)? 2  -CF     Standing up from a chair using your arms (e.g., wheelchair, bedside chair)? 2  -CF     Climbing 3-5 steps with a railing? 1  -CF     To walk in hospital room? 1  -CF     AM-PAC 6 Clicks Score (PT) 12  -CF     Row Name 04/12/22 1349 04/12/22 1150       Functional Assessment    Outcome Measure Options AM-PAC 6 Clicks Basic Mobility (PT)  -CF AM-PAC 6 Clicks Daily Activity (OT)  -          User Key  (r) = Recorded By, (t) = Taken By, (c) = Cosigned By    Initials Name Provider Type    CF Rhoda Antunez, PT Physical Therapist    Sujey Starkey, OT Occupational Therapist                             Physical Therapy Education                 Title: PT OT SLP Therapies (In Progress)     Topic: Physical Therapy (In Progress)     Point: Mobility training (In Progress)     Learning Progress Summary           Patient Acceptance, E, NR by CF at 4/12/2022 1349    Acceptance, E, VU by DB at 4/10/2022 1614    Acceptance, E,TB, NR by MS at 4/7/2022 1535   Family Acceptance, E, VU by DB at 4/10/2022 1614   Significant Other Acceptance, E,TB, NR by MS at 4/7/2022 1535                   Point: Home exercise program (In Progress)     Learning Progress Summary           Patient Acceptance, E, NR by CF at 4/12/2022 1349    Acceptance, E, VU by DB at 4/10/2022 1614    Acceptance, E,TB, NR by MS at 4/7/2022 1535   Family Acceptance, E, VU by DB at 4/10/2022 1614   Significant Other Acceptance, E,TB, NR by MS at 4/7/2022 1535                   Point: Body mechanics (In Progress)     Learning Progress Summary           Patient Acceptance, E, NR by CF at 4/12/2022 1349    Acceptance, E, VU by DB at  4/10/2022 1614    Acceptance, E,TB, NR by MS at 4/7/2022 1535   Family Acceptance, E, VU by DB at 4/10/2022 1614   Significant Other Acceptance, E,TB, NR by MS at 4/7/2022 1535                   Point: Precautions (In Progress)     Learning Progress Summary           Patient Acceptance, E, NR by CF at 4/12/2022 1349    Acceptance, E, VU by DB at 4/10/2022 1614    Acceptance, E,TB, NR by MS at 4/7/2022 1535   Family Acceptance, E, VU by DB at 4/10/2022 1614   Significant Other Acceptance, E,TB, NR by MS at 4/7/2022 1535                               User Key     Initials Effective Dates Name Provider Type Discipline    MS 06/16/21 -  Goldie Abrams, PT Physical Therapist PT    DB 06/16/21 -  Angelica Pierson PT Physical Therapist PT    CF 06/16/21 -  Rhoda Antunez PT Physical Therapist PT              PT Recommendation and Plan     Plan of Care Reviewed With: patient, spouse  Outcome Evaluation: Pt tolerated treatment well this AM. Per ortho notes, concern for femoral nerve palsy and is WBAT with therapy. Pt unable to actively perform hip flexion or knee extension upon asssessment today. He stood multiple times with min Ax2 with L knee blocked. Practiced transfer bed<>chair with mod A x2 with L knee block. Pt left supine in bed with all known needs met. PT will continue to follow and progress as able.     Time Calculation:    PT Charges     Row Name 04/12/22 1341             Time Calculation    Start Time 0947  -CF      Stop Time 1011  -      Time Calculation (min) 24 min  -CF      PT Received On 04/12/22  -CF      PT - Next Appointment 04/13/22  -CF            User Key  (r) = Recorded By, (t) = Taken By, (c) = Cosigned By    Initials Name Provider Type    CF Rhoda Antunez, CICI Physical Therapist              Therapy Charges for Today     Code Description Service Date Service Provider Modifiers Qty    [id number]  PT THERAPEUTIC ACT EA 15 MIN 4/12/2022 Rhoda Antunez, PT GP 2          PT  G-Codes  Outcome Measure Options: AM-PAC 6 Clicks Basic Mobility (PT)  AM-PAC 6 Clicks Score (PT): 12  AM-PAC 6 Clicks Score (OT): 15    Rhoda Antunez, PT  4/12/2022

## 2022-04-12 NOTE — PLAN OF CARE
Goal Outcome Evaluation:  Plan of Care Reviewed With: patient, spouse    Outcome Evaluation: Pt is an 85 y/o male who presents to [facility] with left psoas hematoma with femoral nerve palsy. S/p aspiration of hematoma. Per ortho, pt is WBAT. Pt recently admitted for PNA. He lives with his wife and is indep with ADLs, fxl mobility using a RW or cane. He presents today AOx3 pleasant and agreeable, [place] for bed mobility with assist for LLE. Pt with little to no active hip flexion or knee extension at this time, UB WFL. He req's MinAx2 for STS with L knee blocked d/t buckling. He performs weight shifting activity and TF to bedside chair with ModAx2, back to EOB again with ModAx2. Pt able to doff/don R sock with set-up, unable to don L w/o assist. Pt will benefit from OT services to address fxl deficits, recommend d/c IPR  Therapist used appropriate personal protective equipment including mask, gloves, and eye protection. Hand hygiene was completed before and after therapy session.

## 2022-04-12 NOTE — PLAN OF CARE
Goal Outcome Evaluation:  Plan of Care Reviewed With: patient, spouse           Outcome Evaluation: Pt tolerated treatment well this AM. Per ortho notes, concern for femoral nerve palsy and is WBAT with therapy. Pt unable to actively perform hip flexion or knee extension upon asssessment today. He stood multiple times with min Ax2 with L knee blocked. Practiced transfer bed<>chair with mod A x2 with L knee block. Pt left supine in bed with all known needs met. PT will continue to follow and progress as able.

## 2022-04-12 NOTE — THERAPY EVALUATION
Patient Name: Francisco Sequeira  : 1935    MRN: [id number]                              Today's Date: 2022       Admit Date: 2022    Visit Dx:     ICD-10-CM ICD-9-CM   1. Hematoma of iliopsoas muscle, left, initial encounter  S70.12XA 924.00   2. Chronic anticoagulation  Z79.01 V58.61   3. H/O heart valve replacement with mechanical valve  Z95.2 V43.3   4. Immobility syndrome  M62.3 728.3   5. Anemia, unspecified type  D64.9 285.9   6. 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)   • Iron deficiency anemia   • Chronic anticoagulation   • Hemiparesis of left nondominant side as late effect of cerebral infarction (HCC)   • Rectus sheath hematoma   • Sepsis (HCC)   • Calculus of gallbladder with acute cholecystitis without obstruction   • History of Clostridium difficile infection   • Aspiration pneumonitis (HCC)   • Hematoma of iliopsoas muscle, left, initial encounter   • History of CVA (cerebrovascular accident)   • S/P laparoscopic cholecystectomy   • Anemia     Past Medical History:   Diagnosis Date   • Allergic rhinitis    • Aortic valve insufficiency    • Ascending aortic aneurysm (HCC)    • Atrial fibrillation (HCC)    • Bacteremia    • Calcific aortic stenosis of bicuspid valve    • Cardiac arrest (HCC)    • Cardiomyopathy (HCC)    • CKD (chronic kidney disease) stage 3, GFR 30-59 ml/min (HCC)    • Contact dermatitis due to poison ivy    • Elbow fracture    • Esophageal reflux    • GERD (gastroesophageal reflux disease)    • Head  injury    • History of transfusion    • Hyperglycemia    • Hyperlipidemia    • Hypertension    • Kidney carcinoma (HCC)    • Nonischemic cardiomyopathy (HCC)    • Renal oncocytoma    • Seasonal allergic reaction    • Sinusitis    • Syncope    • Type 2 diabetes mellitus (HCC)     uncontrolled   • Visual field defect      Past Surgical History:   Procedure Laterality Date   • AORTIC VALVE REPAIR/REPLACEMENT     • ASCENDING AORTIC ANEURYSM REPAIR W/ MECHANICAL AORTIC VALVE REPLACEMENT     • CHOLECYSTECTOMY WITH INTRAOPERATIVE CHOLANGIOGRAM N/A 3/29/2022    Procedure: Laparoscopic cholecystectomy with cholangiogram, possible open;  Surgeon: Lisa Guidry MD;  Location: [facility] MAIN OR;  Service: General;  Laterality: N/A;   • COLONOSCOPY N/A 10/28/2021    Procedure: COLONOSCOPY to cecum:  cold snare polyps,;  Surgeon: Dinesh Meza MD;  Location: [facility] ENDOSCOPY;  Service: Gastroenterology;  Laterality: N/A;  pre:  Iron deficiency anemia  post:  polyps, diverticulosis,    • COLONOSCOPY N/A 11/9/2021    Procedure: COLONOSCOPY to cecum with APC cautery of AVM and clip placement x1;  Surgeon: Pavan Rodriguez MD;  Location: [facility] ENDOSCOPY;  Service: Gastroenterology;  Laterality: N/A;  PRE - gi bleed, anemia  POST - diverticulosis, poor prep, AVM right colon   • ENDOSCOPY N/A 10/28/2021    Procedure: ESOPHAGOGASTRODUODENOSCOPY with biopsies;  Surgeon: Dinesh Meza MD;  Location: [facility] ENDOSCOPY;  Service: Gastroenterology;  Laterality: N/A;  pre:  Iron deficiency anemia  post:  duodenitis and gastritis   • EYE SURGERY  12/09/2020    cataract surgery    • NEPHRECTOMY     • OTHER SURGICAL HISTORY      elbow surgery   • OTHER SURGICAL HISTORY      right arm surgery   • PROSTATE SURGERY     • THORACENTESIS Left     diagnostic      General Information     Row Name 04/12/22 1131          OT Time and Intention    Document Type evaluation  -JW     Mode of Treatment co-treatment;occupational  therapy;physical therapy  -     Row Name 04/12/22 1131          General Information    Patient Profile Reviewed yes  -     Prior Level of Function independent:;ADL's;all household mobility  indep with ADLs and fxl mobility using a cane or RW  -     Existing Precautions/Restrictions fall  -     Barriers to Rehab impaired sensation  -     Row Name 04/12/22 1131          Living Environment    People in Home spouse  -[facility] Name 04/12/22 1131          Cognition    Orientation Status (Cognition) oriented x 3  -     Row Name 04/12/22 1131          Safety Issues, Functional Mobility    Safety Issues Affecting Function (Mobility) insight into deficits/self-awareness  -     Impairments Affecting Function (Mobility) balance;postural/trunk control;strength;sensation/sensory awareness;range of motion (ROM);pain;motor control  -           User Key  (r) = Recorded By, (t) = Taken By, (c) = Cosigned By    Initials Name Provider Type     Sujey Herron OT Occupational Therapist                 Mobility/ADL's     Row Name 04/12/22 1136          Bed Mobility    Bed Mobility supine-sit;sit-supine  -     Supine-Sit [place] (Bed Mobility) minimum assist (75% patient effort);verbal cues  -     Sit-Supine [place] (Bed Mobility) minimum assist (75% patient effort);verbal cues  -     Assistive Device (Bed Mobility) bed rails;head of bed elevated;draw sheet  -     Comment, (Bed Mobility) assist for LLE in and out of bed  -[facility] Name 04/12/22 1136          Transfers    Transfers bed-chair transfer;sit-stand transfer  -     Bed-Chair [place] (Transfers) moderate assist (50% patient effort);2 person assist;verbal cues;nonverbal cues (demo/gesture)  -     Assistive Device (Bed-Chair Transfers) --  HHA  -     Sit-Stand [place] (Transfers) minimum assist (75% patient effort);verbal cues;2 person assist  -     Row Name 04/12/22 1136          Bed-Chair Transfer    Comment, (Bed-Chair  Transfer) L knee blocked, multiple cues for sequencing. L knee elizabeth  -JW     Row Name 04/12/22 1136          Sit-Stand Transfer    Comment, (Sit-Stand Transfer) L knee blocked d/t significant buckling and dec motor control  -JW     Row Name 04/12/22 1136          Activities of Daily Living    BADL Assessment/Intervention lower body dressing  -JW     Row Name 04/12/22 1136          Lower Body Dressing Assessment/Training    [place] Level (Lower Body Dressing) moderate assist (50% patient effort);don;doff;socks  -     Position (Lower Body Dressing) edge of bed sitting  -     Comment, (Lower Body Dressing) able to doff/don R sock. Assist for L  -           User Key  (r) = Recorded By, (t) = Taken By, (c) = Cosigned By    Initials Name Provider Type    Sujey Starkey OT Occupational Therapist               Obj/Interventions     Row Name 04/12/22 1140          Sensory Assessment (Somatosensory)    Sensory Assessment impaired sensation on LLE  -[facility] 04/12/22 1140          Vision Assessment/Intervention    Visual Impairment/Limitations WNL;corrective lenses full-time  -JW     Row Name 04/12/22 1140          Range of Motion Comprehensive    General Range of Motion bilateral upper extremity ROM WNL  -     Comment, General Range of Motion impaired AROM in LLE  -[facility] 04/12/22 1140          Strength Comprehensive (MMT)    General Manual Muscle Testing (MMT) Assessment upper extremity strength deficits identified  -     Comment, General Manual Muscle Testing (MMT) Assessment 4+/5 in BUEs  -JW     Row Name 04/12/22 1140          Motor Skills    Motor Skills functional endurance  -     Functional Endurance fair  -JW     Row Name 04/12/22 1140          Balance    Balance Assessment sitting static balance;sitting dynamic balance;standing static balance;standing dynamic balance  -     Static Sitting Balance supervision  -     Dynamic Sitting Balance supervision  -     Position, Sitting  Balance sitting edge of bed;unsupported  -     Static Standing Balance minimal assist;2-person assist;verbal cues  -     Dynamic Standing Balance moderate assist;2-person assist;verbal cues  -     Position/Device Used, Standing Balance supported  -     Balance Interventions sitting;standing  -           User Key  (r) = Recorded By, (t) = Taken By, (c) = Cosigned By    Initials Name Provider Type    JW Sujey Herron, OT Occupational Therapist               Goals/Plan     Row Name 04/12/22 1149          Transfer Goal 1 (OT)    Activity/Assistive Device (Transfer Goal 1, OT) transfers, all;bed-to-chair/chair-to-bed;toilet;shower chair  -     [place] Level/Cues Needed (Transfer Goal 1, OT) minimum assist (75% or more patient effort)  -     Time Frame (Transfer Goal 1, OT) short term goal (STG);2 weeks  -     Progress/Outcome (Transfer Goal 1, OT) goal ongoing  -     Row Name 04/12/22 1149          Dressing Goal 1 (OT)    Activity/Device (Dressing Goal 1, OT) dressing skills, all  -JW     [place]/Cues Needed (Dressing Goal 1, OT) minimum assist (75% or more patient effort)  -JW     Time Frame (Dressing Goal 1, OT) short term goal (STG);2 weeks  -     Strategies/Barriers (Dressing Goal 1, OT) using LH AE as needed for ADLs  -     Progress/Outcome (Dressing Goal 1, OT) goal ongoing  -     Row Name 04/12/22 1149          Toileting Goal 1 (OT)    Activity/Device (Toileting Goal 1, OT) toileting skills, all  -     [place] Level/Cues Needed (Toileting Goal 1, OT) minimum assist (75% or more patient effort)  -     Time Frame (Toileting Goal 1, OT) short term goal (STG);2 weeks  -     Progress/Outcome (Toileting Goal 1, OT) goal ongoing  -     Row Name 04/12/22 1149          Grooming Goal 1 (OT)    Activity/Device (Grooming Goal 1, OT) grooming skills, all  -     [place] (Grooming Goal 1, OT) set-up required  -     Time Frame (Grooming Goal 1, OT) short term goal (STG);2  weeks  -     Progress/Outcome (Grooming Goal 1, OT) goal ongoing  -     Row Name 04/12/22 1149          Therapy Assessment/Plan (OT)    Planned Therapy Interventions (OT) activity tolerance training;BADL retraining;adaptive equipment training;functional balance retraining;occupation/activity based interventions;patient/caregiver education/training;strengthening exercise;transfer/mobility retraining  -           User Key  (r) = Recorded By, (t) = Taken By, (c) = Cosigned By    Initials Name Provider Type    Sujey Starkey, JESSICA Occupational Therapist               Clinical Impression     Row Name 04/12/22 1142          Pain Assessment    Pretreatment Pain Rating 0/10 - no pain  -     Posttreatment Pain Rating 0/10 - no pain  -[facility] Name 04/12/22 1142          Plan of Care Review    Plan of Care Reviewed With patient;spouse  -     Outcome Evaluation Pt is an 85 y/o male who presents to [facility] with left psoas hematoma with femoral nerve palsy. S/p aspiration of hematoma. Per ortho, pt is WBAT. Pt recently admitted for PNA. He lives with his wife and is indep with ADLs, fxl mobility using a RW or cane. He presents today AOx3 pleasant and agreeable, Heriberto for bed mobility with assist for LLE. Pt with little to no active hip flexion or knee extension at this time, UB WFL. He req's MinAx2 for STS with L knee blocked d/t buckling. He performs weight shifting activity and TF to bedside chair with ModAx2, back to EOB again with ModAx2. Pt able to doff/don R sock with set-up, unable to don L w/o assist. Pt will benefit from OT services to address fxl deficits, recommend d/c IPR  -     Row Name 04/12/22 1142          Therapy Assessment/Plan (OT)    Rehab Potential (OT) good, to achieve stated therapy goals  -     Criteria for Skilled Therapeutic Interventions Met (OT) yes;skilled treatment is necessary  -     Therapy Frequency (OT) 5 times/wk  -     Row Name 04/12/22 1142          Therapy Plan Review/Discharge  Plan (OT)    Anticipated Discharge Disposition (OT) inpatient rehabilitation facility  -     Row Name 04/12/22 1142          Positioning and Restraints    Pre-Treatment Position in bed  -     Post Treatment Position bed  -JW     In Bed notified nsg;fowlers;call light within reach;encouraged to call for assist;exit alarm on;with family/caregiver  -           User Key  (r) = Recorded By, (t) = Taken By, (c) = Cosigned By    Initials Name Provider Type    Sujey Starkey OT Occupational Therapist               Outcome Measures     Row Name 04/12/22 1150          How much help from another is currently needed...    Putting on and taking off regular lower body clothing? 2  -JW     Bathing (including washing, rinsing, and drying) 2  -JW     Toileting (which includes using toilet bed pan or urinal) 1  -JW     Putting on and taking off regular upper body clothing 3  -JW     Taking care of personal grooming (such as brushing teeth) 3  -JW     Eating meals 4  -     AM-PAC 6 Clicks Score (OT) 15  -     Row Name 04/12/22 1150          Functional Assessment    Outcome Measure Options AM-PAC 6 Clicks Daily Activity (OT)  -           User Key  (r) = Recorded By, (t) = Taken By, (c) = Cosigned By    Initials Name Provider Type    Sujey Starkey OT Occupational Therapist                Occupational Therapy Education                 Title: PT OT SLP Therapies (In Progress)     Topic: Occupational Therapy (In Progress)     Point: ADL training (Done)     Description:   Instruct learner(s) on proper safety adaptation and remediation techniques during self care or transfers.   Instruct in proper use of assistive devices.              Learning Progress Summary           Patient Acceptance, E, VU by MILLIE at 4/12/2022 1150    Comment: role of OT, plan of care, safety                   Point: Home exercise program (Not Started)     Description:   Instruct learner(s) on appropriate technique for monitoring, assisting and/or  progressing therapeutic exercises/activities.              Learner Progress:  Not documented in this visit.          Point: Precautions (Done)     Description:   Instruct learner(s) on prescribed precautions during self-care and functional transfers.              Learning Progress Summary           Patient Acceptance, E, VU by  at 4/12/2022 1150    Comment: role of OT, plan of care, safety                   Point: Body mechanics (Done)     Description:   Instruct learner(s) on proper positioning and spine alignment during self-care, functional mobility activities and/or exercises.              Learning Progress Summary           Patient Acceptance, E, VU by JW at 4/12/2022 1150    Comment: role of OT, plan of care, safety                               User Key     Initials Effective Dates Name Provider Type Discipline     06/10/21 -  Sujey Herron OT Occupational Therapist OT              OT Recommendation and Plan  Planned Therapy Interventions (OT): activity tolerance training, BADL retraining, adaptive equipment training, functional balance retraining, occupation/activity based interventions, patient/caregiver education/training, strengthening exercise, transfer/mobility retraining  Therapy Frequency (OT): 5 times/wk  Plan of Care Review  Plan of Care Reviewed With: patient, spouse  Outcome Evaluation: Pt is an 85 y/o male who presents to [facility] with left psoas hematoma with femoral nerve palsy. S/p aspiration of hematoma. Per ortho, pt is WBAT. Pt recently admitted for PNA. He lives with his wife and is indep with ADLs, fxl mobility using a RW or cane. He presents today AOx3 pleasant and agreeable, [place] for bed mobility with assist for LLE. Pt with little to no active hip flexion or knee extension at this time, UB WFL. He req's MinAx2 for STS with L knee blocked d/t buckling. He performs weight shifting activity and TF to bedside chair with ModAx2, back to EOB again with ModAx2. Pt able to doff/don R sock with  set-up, unable to don L w/o assist. Pt will benefit from OT services to address fxl deficits, recommend d/c IPR     Time Calculation:    Time Calculation- OT     Row Name 04/12/22 1151             Time Calculation- OT    OT Start Time 0947  -JW      OT Stop Time 1010  -JW      OT Time Calculation (min) 23 min  -JW      Total Timed Code Minutes- OT 13 minute(s)  -JW      OT Received On 04/12/22  -JW      OT - Next Appointment 04/13/22  -      OT Goal Re-Cert Due Date 04/26/22  -JW              Timed Charges    91179 - OT Therapeutic Activity Minutes 13  -JW              Untimed Charges    OT Eval/Re-eval Minutes 10  -JW              Total Minutes    Timed Charges Total Minutes 13  -JW      Untimed Charges Total Minutes 10  -JW       Total Minutes 23  -JW            User Key  (r) = Recorded By, (t) = Taken By, (c) = Cosigned By    Initials Name Provider Type    Sujey Starkey OT Occupational Therapist              Therapy Charges for Today     Code Description Service Date Service Provider Modifiers Qty    62663649844 HC OT THERAPEUTIC ACT EA 15 MIN 4/12/2022 Sujey Herron OT GO 1    82209180261 HC OT EVAL MOD COMPLEXITY 3 4/12/2022 Sujey Herron OT GO 1               Sujey Herron OT  4/12/2022

## 2022-04-12 NOTE — PROGRESS NOTES
"DAILY PROGRESS NOTE  [facility]    Patient Identification:  Name: Francisco Sequeira  Age: 86 y.o.  Sex: male  :  1935  MRN: [id number]         Primary Care Physician: Rubin Hinkle APRN      Subjective  No new complaints.  Overall feels about the same.    Objective:  General Appearance:  Comfortable, in no acute distress and not in pain (Thin, frail.).    Vital signs: (most recent): Blood pressure 114/67, pulse 73, temperature 98.1 °F (36.7 °C), temperature source Oral, resp. rate 18, height 182.9 cm (72\"), weight 68 kg (150 lb), SpO2 98 %.    Lungs:  Normal effort and normal respiratory rate.  Breath sounds clear to auscultation.    Heart: Normal rate.  Irregular rhythm.  (Prosthetic click.)  Neurological: Patient is alert and oriented to person, place and time.  (Still with significant left lower extremity weakness.  About 3/5.).    Skin:  Warm and dry.                Vital signs in last 24 hours:  Temp:  [97.7 °F (36.5 °C)-98.1 °F (36.7 °C)] 98.1 °F (36.7 °C)  Heart Rate:  [73-90] 73  Resp:  [16-18] 18  BP: (114-126)/(67-74) 114/67    Intake/Output:    Intake/Output Summary (Last 24 hours) at 2022 1921  Last data filed at 2022 1825  Gross per 24 hour   Intake 740 ml   Output --   Net 740 ml         Results from last 7 days   Lab Units 22  1157 22  1123 04/10/22  0939 22  0914 22  0151 22  1613 22  1234 22  1653 22  0951   WBC 10*3/mm3 5.56 5.50 5.73 6.28 8.37  --  10.81*  --  7.40   HEMOGLOBIN g/dL 8.7* 8.8* 8.3* 6.7* 7.4* 7.2* 8.1*   < > 7.8*  7.8*   PLATELETS 10*3/mm3 273 288 262 234 238  --  270  --  213    < > = values in this interval not displayed.     Results from last 7 days   Lab Units 22  1157 22  1123 04/10/22  0939 22  0914 22  0151 22  1234 22  0951   SODIUM mmol/L 134* 136 136 135* 139 138 138   POTASSIUM mmol/L 4.6 4.5 4.7 4.5 4.2 4.4 4.2   CHLORIDE mmol/L 102 103 100 100 101 100 " 101   CO2 mmol/L 24.7 26.0 27.0 28.0 30.0* 29.2* 32.0*   BUN mg/dL 16 13 14 14 13 13 11   CREATININE mg/dL 1.19 1.15 1.32* 1.26 1.28* 1.19 1.14   GLUCOSE mg/dL 159* 103* 203* 220* 165* 206* 161*   Estimated Creatinine Clearance: 44.4 mL/min (by C-G formula based on SCr of 1.19 mg/dL).  Results from last 7 days   Lab Units 04/12/22  1157 04/11/22  1123 04/10/22  0939 04/09/22  0914 04/08/22  0151 04/07/22  1234 04/06/22  0951 04/06/22  0002   CALCIUM mg/dL 8.3* 8.0* 8.2* 7.8* 8.0* 8.5* 8.1* 8.5*   ALBUMIN g/dL  --   --   --   --   --   --   --  2.70*   MAGNESIUM mg/dL 2.2 2.1 2.4 2.2 2.0 2.0  --   --    PHOSPHORUS mg/dL 3.1 3.3 2.9 3.0 3.1 2.7  --   --      Results from last 7 days   Lab Units 04/06/22  0002   ALBUMIN g/dL 2.70*   BILIRUBIN mg/dL 0.6   ALK PHOS U/L 131*   AST (SGOT) U/L 14   ALT (SGPT) U/L 11       Assessment:  Hematoma of iliopsoas muscle, left, initial encounter: Associated with Lovenox and aspirin.  Orthopedic input appreciated.  Status post IR aspiration.  Only 1 cc aspirated.  Hemoglobin has been stable or improving for 3 days now.  Will resume Lovenox.  If he tolerates this over the next 24 to 48 hours and also resume aspirin and Coumadin.    Acute blood loss anemia: Secondary to above.  .  Good response to 1 unit packed RBCs.    Presently remaining stable, slowly improving..    Atrial fibrillation (HCC): Rate controlled.  Resume Lovenox.    H/O heart valve replacement with mechanical valve: Resume Lovenox.    Hypertension:    Type 2 diabetes mellitus (HCC): Continue present regime.    Stage 3b chronic kidney disease (HCC)    Chronic anticoagulation: See above.    History of CVA (cerebrovascular accident)    Recent history of laparoscopic cholecystectomy         Plan:  Please see above.  Discussed resuming Lovenox with patient.    Robb Baldwin MD  4/12/2022  19:21 EDT

## 2022-04-12 NOTE — NURSING NOTE
Pt's sugar this morning was read at 33 and retest at 37. IV D50 12.5g given.   Recheck 15 min later = 92

## 2022-04-12 NOTE — PROGRESS NOTES
LOS: 6 days     Subjective :   Patient seen and examined.  He is resting comfortably in his hospital bed.  He is easily aroused from sleep and responds appropriately to questioning.  He states continuing improvement of his symptoms.  At present, he denies any decreased sensation to the anterior thigh.  Still with weakness.  He did undergo aspiration of psoas hematoma with interventional radiology yesterday.  No reported complication.    Objective :    Vital signs in last 24 hours:  Vitals:    04/11/22 1330 04/11/22 1400 04/11/22 1927 04/11/22 2258   BP: 131/67 112/68 121/69 126/69   BP Location: Right arm Right arm Right arm Right arm   Patient Position: Lying Lying Lying Lying   Pulse: 78 81 89 86   Resp: 17 17 16 16   Temp:  97.7 °F (36.5 °C) 98 °F (36.7 °C) 98.1 °F (36.7 °C)   TempSrc:  Oral Oral Oral   SpO2: 100% 100% 95% 100%   Weight:       Height:           PHYSICAL EXAM:  Patient is calm, in no acute distress, awake and oriented x 3.  Left lower extremity with decreased strength in hip flexion.  He is unable to maintain hip flexion when passively placed at about 40 degrees.  Visible muscle contractions are more robust when compared with previous examination.  Strength rated at 2/5.  Subjective report of sensation to the anterior thigh the patient states is equal to light touch bilaterally.  Compartments soft.  Dorsalis pedis pulse palpable at 2+.  Foot is warm and well-perfused      LABS:  Results from last 7 days   Lab Units 04/11/22  1123   WBC 10*3/mm3 5.50   HEMOGLOBIN g/dL 8.8*   HEMATOCRIT % 28.2*   PLATELETS 10*3/mm3 288     Results from last 7 days   Lab Units 04/11/22  1123   SODIUM mmol/L 136   POTASSIUM mmol/L 4.5   CHLORIDE mmol/L 103   CO2 mmol/L 26.0   BUN mg/dL 13   CREATININE mg/dL 1.15   GLUCOSE mg/dL 103*   CALCIUM mg/dL 8.0*     Results from last 7 days   Lab Units 04/08/22  0151 04/07/22  1234 04/05/22  2304   INR  1.19* 1.28* 1.20*         ASSESSMENT:  Left psoas hematoma with  femoral nerve palsy    Plan:  We did discuss that his findings are somewhat encouraging.  Hemoglobin is trending appropriately as of the most recent visible value.    He also continues to have slow improvement in the symptoms of his femoral nerve palsy.  We did, again discuss that full recovery could take quite a long time and that he may not ever reach 100% of his previous strength, but that he will likely continue to do well overall.  He will benefit from continued physical therapy.    Physical Therapy -  increase mobility and range of motion as tolerated - WBAT     No further interventions are planned.  Orthopedics will sign off at this point.  Please call with any questions or concerns.  [phone]    Thank you for the opportunity to participate in this patient's care    Derian Dozier, APRN    Date: 4/12/2022  Time: 06:36 EDT

## 2022-04-12 NOTE — DISCHARGE PLACEMENT REQUEST
"Laura Xie (86 y.o. Male)             Date of Birth   1935    Social Security Number       Address   [address] [place] [address]    Home Phone   [phone]    MRN   [id number]       Adventist   Buddhism    Marital Status                               Admission Date   4/5/22    Admission Type   Emergency    Admitting Provider   oBwen Coughlin MD    Attending Provider   Robb Baldwin MD    Department, Room/Bed   [address], [address]       Discharge Date       Discharge Disposition       Discharge Destination                               Attending Provider: Robb Baldwin MD    Allergies: Other, Penicillins, Percocet [Oxycodone-acetaminophen]    Isolation: None   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)   Code Status: CPR   Advance Care Planning Activity    Ht: 182.9 cm (72\")   Wt: 68 kg (150 lb)    Admission Cmt: None   Principal Problem: Hematoma of iliopsoas muscle, left, initial encounter [S70.12XA]                 Active Insurance as of 4/5/2022     Primary Coverage     Payor Plan Insurance Group Employer/Plan Group    [employer] BLUE [employer] PPO [id number]     Payor Plan Address Payor Plan Phone Number Payor Plan Fax Number Effective Dates    [address] [phone]  1/1/2022 - None Entered    [facility] [id number]       Subscriber Name Subscriber Birth Date Member ID       PATRICIA XIE 7/11/1955 [id number]           Secondary Coverage     Payor Plan Insurance Group Employer/Plan Group    MEDICARE MEDICARE A & B      Payor Plan Address Payor Plan Phone Number Payor Plan Fax Number Effective Dates    [address] [phone]  11/1/2002 - None Entered    [facility] [id number]       Subscriber Name Subscriber Birth Date Member ID       LAURA XIE 1935 [id number]                 Emergency Contacts      (Rel.) Home Phone Work Phone Mobile Phone    Patricia Xie (Spouse) [phone] -- " [phone]    Bruce Silva (Son) [phone] -- [phone]

## 2022-04-13 LAB
ANION GAP SERPL CALCULATED.3IONS-SCNC: 7 MMOL/L (ref 5–15)
BUN SERPL-MCNC: 14 MG/DL (ref 8–23)
BUN/CREAT SERPL: 15.4 (ref 7–25)
CALCIUM SPEC-SCNC: 8.3 MG/DL (ref 8.6–10.5)
CHLORIDE SERPL-SCNC: 105 MMOL/L (ref 98–107)
CO2 SERPL-SCNC: 24 MMOL/L (ref 22–29)
CREAT SERPL-MCNC: 0.91 MG/DL (ref 0.76–1.27)
DEPRECATED RDW RBC AUTO: 50.6 FL (ref 37–54)
DIGOXIN SERPL-MCNC: 0.5 NG/ML (ref 0.6–1.2)
EGFRCR SERPLBLD CKD-EPI 2021: 83.1 ML/MIN/1.73
ERYTHROCYTE [DISTWIDTH] IN BLOOD BY AUTOMATED COUNT: 16.3 % (ref 12.3–15.4)
GLUCOSE BLDC GLUCOMTR-MCNC: 101 MG/DL (ref 70–130)
GLUCOSE BLDC GLUCOMTR-MCNC: 104 MG/DL (ref 70–130)
GLUCOSE BLDC GLUCOMTR-MCNC: 144 MG/DL (ref 70–130)
GLUCOSE BLDC GLUCOMTR-MCNC: 164 MG/DL (ref 70–130)
GLUCOSE BLDC GLUCOMTR-MCNC: 190 MG/DL (ref 70–130)
GLUCOSE SERPL-MCNC: 105 MG/DL (ref 65–99)
HCT VFR BLD AUTO: 29.8 % (ref 37.5–51)
HGB BLD-MCNC: 9.3 G/DL (ref 13–17.7)
MAGNESIUM SERPL-MCNC: 2.3 MG/DL (ref 1.6–2.4)
MCH RBC QN AUTO: 26.5 PG (ref 26.6–33)
MCHC RBC AUTO-ENTMCNC: 31.2 G/DL (ref 31.5–35.7)
MCV RBC AUTO: 84.9 FL (ref 79–97)
PHOSPHATE SERPL-MCNC: 3.1 MG/DL (ref 2.5–4.5)
PLATELET # BLD AUTO: 259 10*3/MM3 (ref 140–450)
PMV BLD AUTO: 10.7 FL (ref 6–12)
POTASSIUM SERPL-SCNC: 4.4 MMOL/L (ref 3.5–5.2)
RBC # BLD AUTO: 3.51 10*6/MM3 (ref 4.14–5.8)
SODIUM SERPL-SCNC: 136 MMOL/L (ref 136–145)
WBC NRBC COR # BLD: 4.12 10*3/MM3 (ref 3.4–10.8)

## 2022-04-13 PROCEDURE — 84100 ASSAY OF PHOSPHORUS: CPT | Performed by: STUDENT IN AN ORGANIZED HEALTH CARE EDUCATION/TRAINING PROGRAM

## 2022-04-13 PROCEDURE — 97530 THERAPEUTIC ACTIVITIES: CPT

## 2022-04-13 PROCEDURE — 83735 ASSAY OF MAGNESIUM: CPT | Performed by: STUDENT IN AN ORGANIZED HEALTH CARE EDUCATION/TRAINING PROGRAM

## 2022-04-13 PROCEDURE — 94762 N-INVAS EAR/PLS OXIMTRY CONT: CPT

## 2022-04-13 PROCEDURE — 63710000001 INSULIN LISPRO (HUMAN) PER 5 UNITS: Performed by: NURSE PRACTITIONER

## 2022-04-13 PROCEDURE — 85027 COMPLETE CBC AUTOMATED: CPT | Performed by: STUDENT IN AN ORGANIZED HEALTH CARE EDUCATION/TRAINING PROGRAM

## 2022-04-13 PROCEDURE — 80048 BASIC METABOLIC PNL TOTAL CA: CPT | Performed by: STUDENT IN AN ORGANIZED HEALTH CARE EDUCATION/TRAINING PROGRAM

## 2022-04-13 PROCEDURE — 99233 SBSQ HOSP IP/OBS HIGH 50: CPT | Performed by: INTERNAL MEDICINE

## 2022-04-13 PROCEDURE — 80162 ASSAY OF DIGOXIN TOTAL: CPT | Performed by: INTERNAL MEDICINE

## 2022-04-13 PROCEDURE — 82962 GLUCOSE BLOOD TEST: CPT

## 2022-04-13 PROCEDURE — 25010000002 ENOXAPARIN PER 10 MG: Performed by: HOSPITALIST

## 2022-04-13 RX ADMIN — FAMOTIDINE 20 MG: 20 TABLET ORAL at 08:21

## 2022-04-13 RX ADMIN — Medication 1 CAPSULE: at 08:21

## 2022-04-13 RX ADMIN — Medication 10 ML: at 08:22

## 2022-04-13 RX ADMIN — MAGNESIUM OXIDE 400 MG (241.3 MG MAGNESIUM) TABLET 400 MG: TABLET at 20:57

## 2022-04-13 RX ADMIN — INSULIN GLARGINE-YFGN 15 UNITS: 100 INJECTION, SOLUTION SUBCUTANEOUS at 21:05

## 2022-04-13 RX ADMIN — MAGNESIUM OXIDE 400 MG (241.3 MG MAGNESIUM) TABLET 400 MG: TABLET at 08:21

## 2022-04-13 RX ADMIN — DIGOXIN 62.5 MCG: 125 TABLET ORAL at 11:59

## 2022-04-13 RX ADMIN — INSULIN LISPRO 2 UNITS: 100 INJECTION, SOLUTION INTRAVENOUS; SUBCUTANEOUS at 18:57

## 2022-04-13 RX ADMIN — Medication 10 ML: at 22:14

## 2022-04-13 RX ADMIN — ENOXAPARIN SODIUM 70 MG: 100 INJECTION SUBCUTANEOUS at 08:21

## 2022-04-13 RX ADMIN — METOPROLOL SUCCINATE 12.5 MG: 25 TABLET, EXTENDED RELEASE ORAL at 08:19

## 2022-04-13 RX ADMIN — ENOXAPARIN SODIUM 70 MG: 100 INJECTION SUBCUTANEOUS at 20:56

## 2022-04-13 RX ADMIN — ATORVASTATIN CALCIUM 40 MG: 20 TABLET, FILM COATED ORAL at 08:19

## 2022-04-13 RX ADMIN — FAMOTIDINE 20 MG: 20 TABLET ORAL at 18:56

## 2022-04-13 NOTE — NURSING NOTE
Call placed to [facility]. Normally patient in A-fib. Patient having multiple pauses on monitor. Longest 5.8 seconds.     0220- NON for cardiology consult. Consult called in. Spoke with colten.    0257- Second page placed for cardiology    0344- third call placed to cardiology. Spoke with Dr. Cárdenas and GUSTAVOO. Continue to monitor.

## 2022-04-13 NOTE — THERAPY TREATMENT NOTE
Patient Name: Francisco Sequeira  : 1935    MRN: [id number]                              Today's Date: 2022       Admit Date: 2022    Visit Dx:     ICD-10-CM ICD-9-CM   1. Hematoma of iliopsoas muscle, left, initial encounter  S70.12XA 924.00   2. Chronic anticoagulation  Z79.01 V58.61   3. H/O heart valve replacement with mechanical valve  Z95.2 V43.3   4. Immobility syndrome  M62.3 728.3   5. Anemia, unspecified type  D64.9 285.9   6. 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)   • Iron deficiency anemia   • Chronic anticoagulation   • Hemiparesis of left nondominant side as late effect of cerebral infarction (HCC)   • Rectus sheath hematoma   • Sepsis (HCC)   • Calculus of gallbladder with acute cholecystitis without obstruction   • History of Clostridium difficile infection   • Aspiration pneumonitis (HCC)   • Hematoma of iliopsoas muscle, left, initial encounter   • History of CVA (cerebrovascular accident)   • S/P laparoscopic cholecystectomy   • Anemia     Past Medical History:   Diagnosis Date   • Allergic rhinitis    • Aortic valve insufficiency    • Ascending aortic aneurysm (HCC)    • Atrial fibrillation (HCC)    • Bacteremia    • Calcific aortic stenosis of bicuspid valve    • Cardiac arrest (HCC)    • Cardiomyopathy (HCC)    • CKD (chronic kidney disease) stage 3, GFR 30-59 ml/min (HCC)    • Contact dermatitis due to poison ivy    • Elbow fracture    • Esophageal reflux    • GERD (gastroesophageal reflux disease)    • Head  injury    • History of transfusion    • Hyperglycemia    • Hyperlipidemia    • Hypertension    • Kidney carcinoma (HCC)    • Nonischemic cardiomyopathy (HCC)    • Renal oncocytoma    • Seasonal allergic reaction    • Sinusitis    • Syncope    • Type 2 diabetes mellitus (HCC)     uncontrolled   • Visual field defect      Past Surgical History:   Procedure Laterality Date   • AORTIC VALVE REPAIR/REPLACEMENT     • ASCENDING AORTIC ANEURYSM REPAIR W/ MECHANICAL AORTIC VALVE REPLACEMENT     • CHOLECYSTECTOMY WITH INTRAOPERATIVE CHOLANGIOGRAM N/A 3/29/2022    Procedure: Laparoscopic cholecystectomy with cholangiogram, possible open;  Surgeon: Lisa Guidry MD;  Location: [facility] MAIN OR;  Service: General;  Laterality: N/A;   • COLONOSCOPY N/A 10/28/2021    Procedure: COLONOSCOPY to cecum:  cold snare polyps,;  Surgeon: Dinesh Meza MD;  Location: [facility] ENDOSCOPY;  Service: Gastroenterology;  Laterality: N/A;  pre:  Iron deficiency anemia  post:  polyps, diverticulosis,    • COLONOSCOPY N/A 11/9/2021    Procedure: COLONOSCOPY to cecum with APC cautery of AVM and clip placement x1;  Surgeon: Pavan Rodriguez MD;  Location: [facility] ENDOSCOPY;  Service: Gastroenterology;  Laterality: N/A;  PRE - gi bleed, anemia  POST - diverticulosis, poor prep, AVM right colon   • ENDOSCOPY N/A 10/28/2021    Procedure: ESOPHAGOGASTRODUODENOSCOPY with biopsies;  Surgeon: Dinesh Meza MD;  Location: [facility] ENDOSCOPY;  Service: Gastroenterology;  Laterality: N/A;  pre:  Iron deficiency anemia  post:  duodenitis and gastritis   • EYE SURGERY  12/09/2020    cataract surgery    • NEPHRECTOMY     • OTHER SURGICAL HISTORY      elbow surgery   • OTHER SURGICAL HISTORY      right arm surgery   • PROSTATE SURGERY     • THORACENTESIS Left     diagnostic      General Information     Row Name 04/13/22 1530          OT Time and Intention    Document Type therapy note (daily note)  -MW     Mode of Treatment occupational  therapy;individual therapy  -     Row Name 04/13/22 1530          General Information    Patient Profile Reviewed yes  -     Existing Precautions/Restrictions fall  -     Row Name 04/13/22 1530          Cognition    Orientation Status (Cognition) oriented x 3  -MW     Row Name 04/13/22 1530          Safety Issues, Functional Mobility    Impairments Affecting Function (Mobility) balance;postural/trunk control;strength;sensation/sensory awareness;range of motion (ROM);pain;motor control;endurance/activity tolerance  -     Comment, Safety Issues/Impairments (Mobility) non skid socks and gait belt donned  -           User Key  (r) = Recorded By, (t) = Taken By, (c) = Cosigned By    Initials Name Provider Type     Raquel Singletary OT Occupational Therapist                 Mobility/ADL's     Row Name 04/13/22 1530          Bed Mobility    Bed Mobility supine-sit;sit-supine  -     Supine-Sit [place] (Bed Mobility) minimum assist (75% patient effort);verbal cues  -     Sit-Supine [place] (Bed Mobility) minimum assist (75% patient effort);verbal cues  -     Assistive Device (Bed Mobility) bed rails;head of bed elevated;draw sheet  -     Comment, (Bed Mobility) LLE assist, increased time to complete  -     Row Name 04/13/22 1530          Transfers    Transfers sit-stand transfer;bed-chair transfer  -     Comment, (Transfers) bed <> chair <> bed this date  -     Bed-Chair [place] (Transfers) maximum assist (25% patient effort);1 person to manage equipment;verbal cues;nonverbal cues (demo/gesture)  -     Assistive Device (Bed-Chair Transfers) walker, front-wheeled  -     Sit-Stand [place] (Transfers) minimum assist (75% patient effort);moderate assist (50% patient effort);verbal cues;nonverbal cues (demo/gesture);1 person assist  -     Row Name 04/13/22 1530          Bed-Chair Transfer    Comment, (Bed-Chair Transfer) L knee buckling, cues to push through BUE onto Rwx for  improved support, max cues for carryover  -     Row Name 04/13/22 1530          Sit-Stand Transfer    Assistive Device (Sit-Stand Transfers) walker, front-wheeled  -[facility] Name 04/13/22 1530          Activities of Daily Living    BADL Assessment/Intervention toileting  -[facility] Name 04/13/22 1530          Toileting Assessment/Training    [place] Level (Toileting) adjust/manage clothing;maximum assist (25% patient effort)  -     Comment, (Toileting) max A while seated at EOB  -           User Key  (r) = Recorded By, (t) = Taken By, (c) = Cosigned By    Initials Name Provider Type     Raquel Singletary OT Occupational Therapist               Obj/Interventions     [facility] Name 04/13/22 1532          Shoulder (Therapeutic Exercise)    Shoulder (Therapeutic Exercise) AROM (active range of motion)  -     Shoulder AROM (Therapeutic Exercise) bilateral;flexion;extension;sitting;2 sets;10 repetitions  -[facility] Name 04/13/22 1532          Elbow/Forearm (Therapeutic Exercise)    Elbow/Forearm (Therapeutic Exercise) AROM (active range of motion)  -     Elbow/Forearm AROM (Therapeutic Exercise) bilateral;flexion;extension;sitting;2 sets;10 repetitions  -[facility] Name 04/13/22 1532          Motor Skills    Therapeutic Exercise shoulder;elbow/forearm  -MW     Row Name 04/13/22 1532          Balance    Balance Assessment sitting static balance;sitting dynamic balance;sit to stand dynamic balance;standing static balance;standing dynamic balance  -     Static Sitting Balance standby assist  -     Dynamic Sitting Balance standby assist  -     Position, Sitting Balance unsupported;sitting edge of bed  -     Sit to Stand Dynamic Balance minimal assist  -     Static Standing Balance minimal assist;1-person assist  -     Dynamic Standing Balance maximum assist;1-person assist;verbal cues;non-verbal cues (demo/gesture)  -     Position/Device Used, Standing Balance supported;walker, front-wheeled  -      Comment, Balance max A for L knee buckling with stand pivots  -MW           User Key  (r) = Recorded By, (t) = Taken By, (c) = Cosigned By    Initials Name Provider Type    Raquel Kebede OT Occupational Therapist               Goals/Plan    No documentation.                Clinical Impression     Row Name 04/13/22 1532          Pain Assessment    Pretreatment Pain Rating 0/10 - no pain  -MW     Posttreatment Pain Rating 0/10 - no pain  -MW     Row Name 04/13/22 1532          Plan of Care Review    Plan of Care Reviewed With patient;spouse  -MW     Progress improving  -MW     Outcome Evaluation Pt seen this date for OT tx session, agreeable and motivated to participate. Pt supine upon arrival with spouse present. Pt completed bed mob with min A, increased time. BUE AROM at EOB 2x10 to maintain joint mobility and improve UE endurance/strength required for optimal support with functional transfers. Pt required max A for adjusting of brief at EOB. x2 STS from EOB with min-mod A x1 using Rwx focusing on optimal weight shifting. Stand pivot bed <> chair with max A x1 using Rwx max cueing for reliance on BUE throughout. Improved stand pivot chair <> bed at end of session with mod-max A x1. Pt will continue to benefit from skilled OT to address goals and deficits. SNf referrals pending.  -MW     Row Name 04/13/22 1532          Vital Signs    O2 Delivery Pre Treatment room air  -MW     O2 Delivery Intra Treatment room air  -MW     O2 Delivery Post Treatment room air  -MW     Pre Patient Position Supine  -MW     Intra Patient Position Standing  -MW     Post Patient Position Supine  -MW     Row Name 04/13/22 1532          Positioning and Restraints    Pre-Treatment Position in bed  -MW     Post Treatment Position bed  -MW     In Bed notified nsg;supine;call light within reach;encouraged to call for assist;exit alarm on;patient within staff view;with other staff  -MW           User Key  (r) = Recorded By, (t) =  Taken By, (c) = Cosigned By    Initials Name Provider Type    Raquel Kebede OT Occupational Therapist               Outcome Measures     Row Name 04/13/22 1535          How much help from another is currently needed...    Putting on and taking off regular lower body clothing? 2  -MW     Bathing (including washing, rinsing, and drying) 2  -MW     Toileting (which includes using toilet bed pan or urinal) 1  -MW     Putting on and taking off regular upper body clothing 3  -MW     Taking care of personal grooming (such as brushing teeth) 3  -MW     Eating meals 4  -MW     AM-PAC 6 Clicks Score (OT) 15  -MW     Row Name 04/13/22 1535          Functional Assessment    Outcome Measure Options AM-PAC 6 Clicks Daily Activity (OT)  -MW           User Key  (r) = Recorded By, (t) = Taken By, (c) = Cosigned By    Initials Name Provider Type    Raquel Kebede OT Occupational Therapist                Occupational Therapy Education                 Title: PT OT SLP Therapies (In Progress)     Topic: Occupational Therapy (In Progress)     Point: ADL training (Done)     Description:   Instruct learner(s) on proper safety adaptation and remediation techniques during self care or transfers.   Instruct in proper use of assistive devices.              Learning Progress Summary           Patient Acceptance, E, VU by MILLIE at 4/12/2022 1150    Comment: role of OT, plan of care, safety                   Point: Home exercise program (Not Started)     Description:   Instruct learner(s) on appropriate technique for monitoring, assisting and/or progressing therapeutic exercises/activities.              Learner Progress:  Not documented in this visit.          Point: Precautions (Done)     Description:   Instruct learner(s) on prescribed precautions during self-care and functional transfers.              Learning Progress Summary           Patient Acceptance, E, VU by MILLIE at 4/12/2022 1150    Comment: role of OT, plan of care, safety                    Point: Body mechanics (Done)     Description:   Instruct learner(s) on proper positioning and spine alignment during self-care, functional mobility activities and/or exercises.              Learning Progress Summary           Patient Acceptance, E, VU by MILLIE at 4/12/2022 1150    Comment: role of OT, plan of care, safety                               User Key     Initials Effective Dates Name Provider Type Discipline    MILLIE 06/10/21 -  Sujey Herron, OT Occupational Therapist OT              OT Recommendation and Plan     Plan of Care Review  Plan of Care Reviewed With: patient, spouse  Progress: improving  Outcome Evaluation: Pt seen this date for OT tx session, agreeable and motivated to participate. Pt supine upon arrival with spouse present. Pt completed bed mob with min A, increased time. BUE AROM at EOB 2x10 to maintain joint mobility and improve UE endurance/strength required for optimal support with functional transfers. Pt required max A for adjusting of brief at EOB. x2 STS from EOB with min-mod A x1 using Rwx focusing on optimal weight shifting. Stand pivot bed <> chair with max A x1 using Rwx max cueing for reliance on BUE throughout. Improved stand pivot chair <> bed at end of session with mod-max A x1. Pt will continue to benefit from skilled OT to address goals and deficits. SNf referrals pending.     Time Calculation:    Time Calculation- OT     Row Name 04/13/22 1536             Time Calculation- OT    OT Start Time 1424  -MW      OT Stop Time 1455  -MW      OT Time Calculation (min) 31 min  -MW      Total Timed Code Minutes- OT 31 minute(s)  -MW      OT Received On 04/13/22  -MW      OT - Next Appointment 04/14/22  -MW              Timed Charges    30709 - OT Therapeutic Activity Minutes 31  -MW              Total Minutes    Timed Charges Total Minutes 31  -MW       Total Minutes 31  -MW            User Key  (r) = Recorded By, (t) = Taken By, (c) = Cosigned By    Initials Name Provider  Type     Raquel Singletary OT Occupational Therapist              Therapy Charges for Today     Code Description Service Date Service Provider Modifiers Qty    47282873001 HC OT THERAPEUTIC ACT EA 15 MIN 4/13/2022 Raquel Singletary OT GO 2               Raquel Singletary OT  4/13/2022

## 2022-04-13 NOTE — PROGRESS NOTES
LOS: 7 days   Patient Care Team:  Rubin Hinkle APRN as PCP - General (Family Medicine)  Audra Vazquez RODDY as Pharmacist  Vasquez Niño PharmD as Pharmacist (Pharmacy)  Tatiana Tinoco MD as Consulting Physician (Gastroenterology)    Chief Complaint: Reconsulted for pauses.    Interval History: Multiple pauses of over 3 seconds overnight, with the longest at 5.8 seconds.  All of these were during sleep.  The patient was asymptomatic.  No chest pain.  He is still very weak.    Vital Signs:  Temp:  [97.7 °F (36.5 °C)-98.7 °F (37.1 °C)] 98.3 °F (36.8 °C)  Heart Rate:  [73-90] 84  Resp:  [18] 18  BP: (111-129)/(48-70) 111/59    Intake/Output Summary (Last 24 hours) at 4/13/2022 1319  Last data filed at 4/13/2022 1300  Gross per 24 hour   Intake 720 ml   Output --   Net 720 ml       Physical Exam:   General Appearance:    No acute distress, alert and oriented x4, thin   Lungs:     Clear to auscultation bilaterally     Heart:   Irregularly irregular rhythm with a normal rate.  Mechanical S2.  II/VI SM RUSB.   Abdomen:     Soft, nontender, nondistended.    Extremities:   No clubbing, cyanosis, or edema.     Results Review:    Results from last 7 days   Lab Units 04/13/22  0652   SODIUM mmol/L 136   POTASSIUM mmol/L 4.4   CHLORIDE mmol/L 105   CO2 mmol/L 24.0   BUN mg/dL 14   CREATININE mg/dL 0.91   GLUCOSE mg/dL 105*   CALCIUM mg/dL 8.3*         Results from last 7 days   Lab Units 04/13/22  0652   WBC 10*3/mm3 4.12   HEMOGLOBIN g/dL 9.3*   HEMATOCRIT % 29.8*   PLATELETS 10*3/mm3 259     Results from last 7 days   Lab Units 04/08/22  0151 04/07/22  1234   INR  1.19* 1.28*         Results from last 7 days   Lab Units 04/13/22  0652   MAGNESIUM mg/dL 2.3           I reviewed the patient's new clinical results.        Assessment:  1.  Spontaneous left iliopsoas hematoma secondary to anticoagulation  2.  Mechanical aortic valve replacement  3.  Prior CVA with residual left-sided weakness (INR at 2.2)  4.  Chronic  anticoagulation with goal INR of 3 - 3.5  5.  History of lower GI bleed with hematochezia in November 2021  6.  Right rectus sheath hematoma in November 2021  7.  Persistent atrial fibrillation  8.  History of renal cell carcinoma and right nephrectomy   9.  Chronic kidney disease  10.  Recurrent right pleural effusion, status post multiple thoracenteses  11.  Multifactorial anemia  12.  Diabetes  13.  Deconditioning and weakness  14.  Multiple pauses noted on telemetry of up to 5.8 seconds    Plan:  -The patient was completely asymptomatic during these events, and he actually was sleeping during all of these.  I strongly suspect that he has obstructive sleep apnea.  He does snore per his wife.  I am going to order an overnight oximetry.    -I checked a stat digoxin level which was 0.5.  I would continue the minimal digoxin with the Toprol-XL at 12.5 mg/day for now.    -No indication for a pacemaker at this point.  If he does have evidence of potential hypoxia and sleep apnea on the oximetry, I would have a low threshold to consult pulmonology for treatment.    -Lovenox resumed for the mechanical aortic valve replacement.  Watch for the left iliopsoas hematoma to potentially expand.  If he tolerates this, resume warfarin potentially tomorrow.    -He has had a previous stroke with an INR of 2.2.  His goal INR is 3-3.5.    -Increase ambulation and activity as tolerated.  He feels very weak in general.    Jimenez Mast MD  04/13/22  13:19 EDT

## 2022-04-13 NOTE — PLAN OF CARE
Goal Outcome Evaluation:  Plan of Care Reviewed With: patient, spouse        Progress: improving  Outcome Evaluation: Pt seen this date for OT tx session, agreeable and motivated to participate. Pt supine upon arrival with spouse present. Pt completed bed mob with min A, increased time. BUE AROM at EOB 2x10 to maintain joint mobility and improve UE endurance/strength required for optimal support with functional transfers. Pt required max A for adjusting of brief at EOB. x2 STS from EOB with min-mod A x1 using Rwx focusing on optimal weight shifting. Stand pivot bed <> chair with max A x1 using Rwx max cueing for reliance on BUE throughout. Improved stand pivot chair <> bed at end of session with mod-max A x1. Pt will continue to benefit from skilled OT to address goals and deficits. SNf referrals pending.    Therapist in mask, gloves and hand hygiene performed.

## 2022-04-13 NOTE — PROGRESS NOTES
"DAILY PROGRESS NOTE  [facility]    Patient Identification:  Name: Francisco Sequeira  Age: 86 y.o.  Sex: male  :  1935  MRN: [id number]         Primary Care Physician: Rubin Hinkle APRN    Subjective:  Interval History:His right leg is weak.    Objective:    Scheduled Meds:atorvastatin, 40 mg, Oral, Daily  digoxin, 62.5 mcg, Oral, Daily  enoxaparin, 1 mg/kg, Subcutaneous, BID  famotidine, 20 mg, Oral, BID AC  ferrous sulfate, 325 mg, Oral, Every Other Day  insulin glargine, 15 Units, Subcutaneous, Nightly  insulin lispro, 0-9 Units, Subcutaneous, TID AC  lactobacillus acidophilus, 1 capsule, Oral, Daily  magnesium oxide, 400 mg, Oral, BID  metoprolol succinate XL, 12.5 mg, Oral, Daily  sodium chloride, 10 mL, Intravenous, Q12H  vancomycin, 125 mg, Oral, Every Other Day      Continuous Infusions:     Vital signs in last 24 hours:  Temp:  [97.7 °F (36.5 °C)-98.7 °F (37.1 °C)] 98.3 °F (36.8 °C)  Heart Rate:  [82-90] 84  Resp:  [18] 18  BP: (111-129)/(48-70) 111/59    Intake/Output:    Intake/Output Summary (Last 24 hours) at 2022 1459  Last data filed at 2022 1300  Gross per 24 hour   Intake 720 ml   Output --   Net 720 ml       Exam:  /59 (BP Location: Right arm, Patient Position: Lying)   Pulse 84   Temp 98.3 °F (36.8 °C) (Oral)   Resp 18   Ht 182.9 cm (72\")   Wt 68 kg (150 lb)   SpO2 100%   BMI 20.34 kg/m²     General Appearance:    Alert, cooperative, no distress   Head:    Normocephalic, without obvious abnormality, atraumatic   Eyes:       Throat:   Lips, tongue, gums normal   Neck:   Supple, symmetrical, trachea midline, no JVD   Lungs:     Clear to auscultation bilaterally, respirations unlabored   Chest Wall:    No tenderness or deformity    Heart:    Regular rate and rhythm, S1 and S2 normal, no murmur,no  Rub or gallop   Abdomen:     Soft, nontender, bowel sounds active, no masses, no organomegaly    Extremities:   Extremities normal, atraumatic, no cyanosis " or edema   Pulses:      Skin:   Skin is warm and dry,  no rashes or palpable lesions   Neurologic:   Right leg weakness      Lab Results (last 72 hours)     Procedure Component Value Units Date/Time    Digoxin Level [[id number]]  (Abnormal) Collected: 04/13/22 0652    Specimen: Blood Updated: 04/13/22 1121     Digoxin 0.50 ng/mL     POC Glucose Once [[id number]]  (Abnormal) Collected: 04/13/22 1116    Specimen: Blood Updated: 04/13/22 1119     Glucose 144 mg/dL      Comment: Meter: [id number] : [id number] Alyssa Ramses ALEJO       Basic Metabolic Panel [[id number]]  (Abnormal) Collected: 04/13/22 0652    Specimen: Blood Updated: 04/13/22 0750     Glucose 105 mg/dL      BUN 14 mg/dL      Creatinine 0.91 mg/dL      Sodium 136 mmol/L      Potassium 4.4 mmol/L      Chloride 105 mmol/L      CO2 24.0 mmol/L      Calcium 8.3 mg/dL      BUN/Creatinine Ratio 15.4     Anion Gap 7.0 mmol/L      eGFR 83.1 mL/min/1.73      Comment: National Kidney Foundation and American Society of Nephrology (ASN) Task Force recommended calculation based on the Chronic Kidney Disease Epidemiology Collaboration (CKD-EPI) equation refit without adjustment for race.       Narrative:      GFR Normal >60  Chronic Kidney Disease <60  Kidney Failure <15      Phosphorus [[id number]]  (Normal) Collected: 04/13/22 0652    Specimen: Blood Updated: 04/13/22 0750     Phosphorus 3.1 mg/dL     Magnesium [[id number]]  (Normal) Collected: 04/13/22 0652    Specimen: Blood Updated: 04/13/22 0750     Magnesium 2.3 mg/dL     Body Fluid Culture - Aspirate, Psoas [[id number]] Collected: 04/11/22 1305    Specimen: Aspirate from Psoas Updated: 04/13/22 0734     Body Fluid Culture No growth at 2 days     Gram Stain Few (2+) WBCs per low power field      No organisms seen    CBC (No Diff) [[id number]]  (Abnormal) Collected: 04/13/22 0652    Specimen: Blood Updated: 04/13/22 0716     WBC 4.12 10*3/mm3      RBC 3.51 10*6/mm3      Hemoglobin 9.3 g/dL      Hematocrit 29.8 %       MCV 84.9 fL      MCH 26.5 pg      MCHC 31.2 g/dL      RDW 16.3 %      RDW-SD 50.6 fl      MPV 10.7 fL      Platelets 259 10*3/mm3     POC Glucose Once [[id number]]  (Normal) Collected: 04/13/22 0639    Specimen: Blood Updated: 04/13/22 0640     Glucose 101 mg/dL      Comment: Meter: [id number] : [id number] Gamboa Damon NA       POC Glucose Once [[id number]]  (Normal) Collected: 04/13/22 0236    Specimen: Blood Updated: 04/13/22 0237     Glucose 104 mg/dL      Comment: Meter: [id number] : [id number] Jason Sweeney RN       POC Glucose Once [[id number]]  (Abnormal) Collected: 04/12/22 1950    Specimen: Blood Updated: 04/12/22 1951     Glucose 143 mg/dL      Comment: Meter: [id number] : [id number] Gamboa Damon NA       POC Glucose Once [[id number]]  (Normal) Collected: 04/12/22 1610    Specimen: Blood Updated: 04/12/22 1612     Glucose 108 mg/dL      Comment: Meter: [id number] : [id number] Abhinav ALEJO       Basic Metabolic Panel [[id number]]  (Abnormal) Collected: 04/12/22 1157    Specimen: Blood Updated: 04/12/22 1233     Glucose 159 mg/dL      BUN 16 mg/dL      Creatinine 1.19 mg/dL      Sodium 134 mmol/L      Potassium 4.6 mmol/L      Chloride 102 mmol/L      CO2 24.7 mmol/L      Calcium 8.3 mg/dL      BUN/Creatinine Ratio 13.4     Anion Gap 7.3 mmol/L      eGFR 60.2 mL/min/1.73      Comment: National Kidney Foundation and American Society of Nephrology (ASN) Task Force recommended calculation based on the Chronic Kidney Disease Epidemiology Collaboration (CKD-EPI) equation refit without adjustment for race.       Narrative:      GFR Normal >60  Chronic Kidney Disease <60  Kidney Failure <15      Phosphorus [[id number]]  (Normal) Collected: 04/12/22 1157    Specimen: Blood Updated: 04/12/22 1233     Phosphorus 3.1 mg/dL     Magnesium [[id number]]  (Normal) Collected: 04/12/22 1157    Specimen: Blood Updated: 04/12/22 1233     Magnesium 2.2 mg/dL     CBC (No Diff) [[id number]]  (Abnormal) Collected:  04/12/22 1157    Specimen: Blood Updated: 04/12/22 1211     WBC 5.56 10*3/mm3      RBC 3.33 10*6/mm3      Hemoglobin 8.7 g/dL      Hematocrit 28.5 %      MCV 85.6 fL      MCH 26.1 pg      MCHC 30.5 g/dL      RDW 16.1 %      RDW-SD 49.5 fl      MPV 10.9 fL      Platelets 273 10*3/mm3     POC Glucose Once [[id number]]  (Abnormal) Collected: 04/12/22 1106    Specimen: Blood Updated: 04/12/22 1108     Glucose 154 mg/dL      Comment: Meter: [id number] : [id number] Abhinav ALEJO       POC Glucose Once [[id number]]  (Normal) Collected: 04/12/22 0621    Specimen: Blood Updated: 04/12/22 0622     Glucose 92 mg/dL      Comment: Meter: [id number] : [id number] Markos Nataia NA       POC Glucose Once [[id number]]  (Abnormal) Collected: 04/12/22 0602    Specimen: Blood Updated: 04/12/22 0604     Glucose 37 mg/dL      Comment: RN Notified R and V Meter: [id number] : [id number] Markos Nataia NA       POC Glucose Once [[id number]]  (Abnormal) Collected: 04/12/22 0601    Specimen: Blood Updated: 04/12/22 0602     Glucose 33 mg/dL      Comment: Repeat Test Meter: [id number] : [id number] Markos Nataia NA       POC Glucose Once [[id number]]  (Normal) Collected: 04/11/22 1936    Specimen: Blood Updated: 04/11/22 1938     Glucose 125 mg/dL      Comment: Meter: [id number] : [id number] Markos Nataia NA       POC Glucose Once [[id number]]  (Abnormal) Collected: 04/11/22 1610    Specimen: Blood Updated: 04/11/22 1611     Glucose 172 mg/dL      Comment: RN Notified R and V Meter: [id number] : [id number] Sen ALEJO       Basic Metabolic Panel [[id number]]  (Abnormal) Collected: 04/11/22 1123    Specimen: Blood Updated: 04/11/22 1246     Glucose 103 mg/dL      BUN 13 mg/dL      Creatinine 1.15 mg/dL      Sodium 136 mmol/L      Potassium 4.5 mmol/L      Chloride 103 mmol/L      CO2 26.0 mmol/L      Calcium 8.0 mg/dL      BUN/Creatinine Ratio 11.3     Anion Gap 7.0 mmol/L      eGFR 62.8 mL/min/1.73      Comment: National  Kidney Foundation and American Society of Nephrology (ASN) Task Force recommended calculation based on the Chronic Kidney Disease Epidemiology Collaboration (CKD-EPI) equation refit without adjustment for race.       Narrative:      GFR Normal >60  Chronic Kidney Disease <60  Kidney Failure <15      Magnesium [[id number]]  (Normal) Collected: 04/11/22 1123    Specimen: Blood Updated: 04/11/22 1246     Magnesium 2.1 mg/dL     Phosphorus [[id number]]  (Normal) Collected: 04/11/22 1123    Specimen: Blood Updated: 04/11/22 1246     Phosphorus 3.3 mg/dL     CBC (No Diff) [[id number]]  (Abnormal) Collected: 04/11/22 1123    Specimen: Blood Updated: 04/11/22 1230     WBC 5.50 10*3/mm3      RBC 3.28 10*6/mm3      Hemoglobin 8.8 g/dL      Hematocrit 28.2 %      MCV 86.0 fL      MCH 26.8 pg      MCHC 31.2 g/dL      RDW 15.9 %      RDW-SD 49.4 fl      MPV 11.3 fL      Platelets 288 10*3/mm3     POC Glucose Once [[id number]]  (Normal) Collected: 04/11/22 1103    Specimen: Blood Updated: 04/11/22 1105     Glucose 93 mg/dL      Comment: Meter: [id number] : [id number] Sen Trent NA       POC Glucose Once [[id number]]  (Normal) Collected: 04/11/22 0537    Specimen: Blood Updated: 04/11/22 0538     Glucose 98 mg/dL      Comment: Meter: [id number] : [id number] Ceasar Gladys NA       POC Glucose Once [[id number]]  (Normal) Collected: 04/11/22 0241    Specimen: Blood Updated: 04/11/22 0242     Glucose 97 mg/dL      Comment: Meter: [id number] : [id number] Ceasar Gladys NA       POC Glucose Once [[id number]]  (Normal) Collected: 04/10/22 1949    Specimen: Blood Updated: 04/10/22 1950     Glucose 113 mg/dL      Comment: Meter: [id number] : [id number] Jason Sweeney RN       POC Glucose Once [[id number]]  (Abnormal) Collected: 04/10/22 1602    Specimen: Blood Updated: 04/10/22 1603     Glucose 159 mg/dL      Comment: Meter: [id number] : [id number] Leon Reyes Clausia NA           Data Review:  Results from last 7 days    Lab Units 04/13/22  0652 04/12/22  1157 04/11/22  1123   SODIUM mmol/L 136 134* 136   POTASSIUM mmol/L 4.4 4.6 4.5   CHLORIDE mmol/L 105 102 103   CO2 mmol/L 24.0 24.7 26.0   BUN mg/dL 14 16 13   CREATININE mg/dL 0.91 1.19 1.15   GLUCOSE mg/dL 105* 159* 103*   CALCIUM mg/dL 8.3* 8.3* 8.0*     Results from last 7 days   Lab Units 04/13/22  0652 04/12/22  1157 04/11/22  1123   WBC 10*3/mm3 4.12 5.56 5.50   HEMOGLOBIN g/dL 9.3* 8.7* 8.8*   HEMATOCRIT % 29.8* 28.5* 28.2*   PLATELETS 10*3/mm3 259 273 288             Lab Results   Lab Value Date/Time    TROPONINT 0.030 04/06/2022 0002    TROPONINT 0.018 03/30/2022 1625    TROPONINT 0.027 02/18/2022 2305    TROPONINT 0.011 10/23/2021 1617    TROPONINT 0.017 07/15/2021 2029    TROPONINT <0.010 01/13/2020 0747    TROPONINT <0.010 03/30/2017 0809               Invalid input(s): PROT, LABALBU          Glucose   Date/Time Value Ref Range Status   04/13/2022 1116 144 (H) 70 - 130 mg/dL Final     Comment:     Meter: [id number] : [id number] Alyssa Lawson NA   04/13/2022 0639 101 70 - 130 mg/dL Final     Comment:     Meter: [id number] : [id number] Bee Damon NA   04/13/2022 0236 104 70 - 130 mg/dL Final     Comment:     Meter: [id number] : [id number] Jason Sweeney RN   04/12/2022 1950 143 (H) 70 - 130 mg/dL Final     Comment:     Meter: [id number] : [id number] Bee Damon NA   04/12/2022 1610 108 70 - 130 mg/dL Final     Comment:     Meter: [id number] : [id number] Abhinav Lomeli NA   04/12/2022 1106 154 (H) 70 - 130 mg/dL Final     Comment:     Meter: [id number] : [id number] Abhinav ALEJO   04/12/2022 0621 92 70 - 130 mg/dL Final     Comment:     Meter: [id number] : [id number] Markos ALEJO   04/12/2022 0602 37 (C) 70 - 130 mg/dL Final     Comment:     RN Notified R and V Meter: [id number] : [id number] Markos ALEJO     Results from last 7 days   Lab Units 04/08/22  0151 04/07/22  1234   INR  1.19* 1.28*       Past Medical History:    Diagnosis Date   • Allergic rhinitis    • Aortic valve insufficiency    • Ascending aortic aneurysm (HCC)    • Atrial fibrillation (HCC)    • Bacteremia    • Calcific aortic stenosis of bicuspid valve    • Cardiac arrest (HCC)    • Cardiomyopathy (HCC)    • CKD (chronic kidney disease) stage 3, GFR 30-59 ml/min (HCC)    • Contact dermatitis due to poison ivy    • Elbow fracture    • Esophageal reflux    • GERD (gastroesophageal reflux disease)    • Head injury    • History of transfusion    • Hyperglycemia    • Hyperlipidemia    • Hypertension    • Kidney carcinoma (HCC)    • Nonischemic cardiomyopathy (HCC)    • Renal oncocytoma    • Seasonal allergic reaction    • Sinusitis    • Syncope    • Type 2 diabetes mellitus (HCC)     uncontrolled   • Visual field defect        Assessment:  Active Hospital Problems    Diagnosis  POA   • **Hematoma of iliopsoas muscle, left, initial encounter [S70.12XA]  Yes   • History of CVA (cerebrovascular accident) [Z86.73]  Not Applicable   • S/P laparoscopic cholecystectomy [Z90.49]  Not Applicable   • Anemia [D64.9]  Yes   • Chronic anticoagulation [Z79.01]  Not Applicable   • Stage 3b chronic kidney disease (HCC) [N18.32]  Yes   • H/O heart valve replacement with mechanical valve [Z95.2]  Not Applicable   • Type 2 diabetes mellitus (HCC) [E11.9]  Yes   • Hypertension [I10]  Yes   • Atrial fibrillation (HCC) [I48.91]  Yes      Resolved Hospital Problems   No resolved problems to display.       Plan:  Continue with current RX as per cardiology. Restart coumadin soon. Will need SNU for rehab. Follow lab.    Leobardo Juarez MD  4/13/2022  14:59 EDT

## 2022-04-13 NOTE — NURSING NOTE
Re-consulted for 16 pauses 3 seconds or greater in the last 24 hours. The longest being 5.8 seconds. The longest 5 below:      04/13/2022 12:36 AM 5.8 Sec Pause      04/13/2022 02:43 AM 5 Sec      04/12/2022 20:58 PM 4.5 Sec      04/13/2022 02:56 AM 4.5 Sec      04/12/2022 21:39 PM 3.8 Sec

## 2022-04-13 NOTE — PLAN OF CARE
Goal Outcome Evaluation:  Plan of Care Reviewed With: patient        Progress: declining   A&OX4-able to make needs known. VSS. A-fib on monitor. Multiple pauses on monitor. Cardiology consulted and notified. RA. Accumax on bed. Z-guard to bottom. Q2 hour turn. Bed in low position. Call light in reach. Bed alarm on.

## 2022-04-13 NOTE — CASE MANAGEMENT/SOCIAL WORK
Continued Stay Note  [facility]     Patient Name: Francisco Sequeira  MRN: [id number]  Today's Date: 4/13/2022    Admit Date: 4/5/2022     Discharge Plan     Row Name 04/13/22 1400       Plan    Plan SNF referrals pending    Patient/Family in Agreement with Plan yes    Plan Comments CSW spoke to patient’s spouse over the phone regarding d/c plan. Per request of patient’s spouse, CSW placed additional SNF referrals to [facility], [facility], [facility], [facility], and [facility] rehab. Patient’s spouse states she is continuing to research SNF’s and is requesting CCP follow up with her tomorrow with bed availability. Patient’s spouse states she is not ready to make a facility determination at this time. JASON KAT               Discharge Codes    No documentation.               Expected Discharge Date and Time     Expected Discharge Date Expected Discharge Time    Apr 15, 2022             JASON RASHID

## 2022-04-13 NOTE — PAYOR COMM NOTE
"Laura Xie (86 y.o. Male)     PLEASE SEE ATTACHED FOR CONTINUED STAY AUTH.    REF#[id number]    PLEASE CALL   OR  [phone]    THANK YOU    NAVDEEP LIEBERMAN LPN CCP            Date of Birth   1935    Social Security Number       Address   [address] [place] [address]    Home Phone   [phone]    MRN   [id number]       Mandaen   Mormonism    Marital Status                               Admission Date   4/5/22    Admission Type   Emergency    Admitting Provider   Bowen Coughlin MD    Attending Provider   Leobardo Juarez MD    Department, Room/Bed   [address], [address]       Discharge Date       Discharge Disposition       Discharge Destination                               Attending Provider: Leobardo Juarez MD    Allergies: Other, Penicillins, Percocet [Oxycodone-acetaminophen]    Isolation: None   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)   Code Status: CPR   Advance Care Planning Activity    Ht: 182.9 cm (72\")   Wt: 68 kg (150 lb)    Admission Cmt: None   Principal Problem: Hematoma of iliopsoas muscle, left, initial encounter [S70.12XA]                 Active Insurance as of 4/5/2022     Primary Coverage     Payor Plan Insurance Group Employer/Plan Group    ANTHEM BLUE CROSS ANTHEM BLUE CROSS BLUE SHIELD PPO [id number]     Payor Plan Address Payor Plan Phone Number Payor Plan Fax Number Effective Dates    [address] [phone]  1/1/2022 - None Entered    [facility] [address]       Subscriber Name Subscriber Birth Date Member ID       PATRICIA XIE 7/11/1955 [id number]           Secondary Coverage     Payor Plan Insurance Group Employer/Plan Group    MEDICARE MEDICARE A & B      Payor Plan Address Payor Plan Phone Number Payor Plan Fax Number Effective Dates    [address] [phone]  11/1/2002 - None Entered    [facility] [id number]       Subscriber Name Subscriber Birth Date Member ID       LAURA XIE 1935 " [id number]                 Emergency Contacts      (Rel.) Home Phone Work Phone Mobile Phone    Gladys Sequeira (Spouse) [phone] -- [phone]    SelinayumikoBruce felipe (Son) [phone] -- [phone]              Oxygen Therapy (last 2 days)     Date/Time SpO2 Device (Oxygen Therapy) Flow (L/min) Oxygen Concentration (%) ETCO2 (mmHg)    04/13/22 0713 99 room air -- -- --    04/13/22 0450 99 room air -- -- --    04/12/22 2353 -- room air -- -- --    04/12/22 2324 97 room air -- -- --    04/12/22 2140 -- room air -- -- --    04/12/22 1951 99 room air -- -- --    04/12/22 1413 98 -- -- -- --    04/12/22 0722 100 -- -- -- --    04/12/22 0020 -- room air -- -- --    04/11/22 2258 100 room air -- -- --    04/11/22 2059 -- room air -- -- --    04/11/22 1927 95 room air -- -- --    04/11/22 1400 100 room air -- -- --    04/11/22 1330 100 room air -- -- --    04/11/22 1315 98 room air -- -- --    04/11/22 1308 100 room air -- -- --    04/11/22 13:05:13 100 -- -- -- --    04/11/22 13:01:20 100 -- -- -- --    04/11/22 12:53:41 96 -- -- -- --    04/11/22 1202 98 -- -- -- --    04/11/22 0700 97 room air -- -- --        Intake & Output (last 2 days)       04/11 0701 04/12 0700 04/12 0701 04/13 0700 04/13 0701 04/14 0700    P.O. 760 860     Total Intake(mL/kg) 760 (11.2) 860 (12.6)     Urine (mL/kg/hr)       Stool       Total Output       Net +760 +860            Urine Unmeasured Occurrence 3 x 1 x     Stool Unmeasured Occurrence 2 x 1 x         Lines, Drains & Airways     Active LDAs     Name Placement date Placement time Site Days    Peripheral IV 04/09/22 1620 Left Hand 04/09/22  1620  Hand  3                  Medication Administration Report for Francisco Sequeira as of 04/13/22 0921   Legend:    Given Hold Not Given Due Canceled Entry Other Actions    Time Time (Time) Time  Time-Action       Discontinued     Completed     Future     MAR Hold     Linked           Medications 04/11/22 04/12/22 04/13/22     acetaminophen (TYLENOL) tablet 650 mg  Dose: 650 mg  Freq: Every 4 Hours PRN Route: PO  PRN Reason: Mild Pain   Start: 04/06/22 0323   Admin Instructions:   Do not exceed 4 grams of acetaminophen in a 24 hr period. Max dose of 2gm for AST/ALT greater than 120 units/L    If given for fever, use fever parameter: fever greater than 100.4 °F.    If given for pain, use the following pain scale:   Mild Pain = Pain Score of 1-3, CPOT 1-2  Moderate Pain = Pain Score of 4-6, CPOT 3-4  Severe Pain = Pain Score of 7-10, CPOT 5-8         Or  acetaminophen (TYLENOL) 160 MG/5ML solution 650 mg  Dose: 650 mg  Freq: Every 4 Hours PRN Route: PO  PRN Reason: Mild Pain   Start: 04/06/22 0323   Admin Instructions:   Do not exceed 4 grams of acetaminophen in a 24 hr period. Max dose of 2gm for AST/ALT greater than 120 units/L    If given for fever, use fever parameter: fever greater than 100.4 °F.    If given for pain, use the following pain scale:   Mild Pain = Pain Score of 1-3, CPOT 1-2  Moderate Pain = Pain Score of 4-6, CPOT 3-4  Severe Pain = Pain Score of 7-10, CPOT 5-8         Or  acetaminophen (TYLENOL) suppository 650 mg  Dose: 650 mg  Freq: Every 4 Hours PRN Route: RE  PRN Reason: Mild Pain   Start: 04/06/22 0323   Admin Instructions:   Do not exceed 4 grams of acetaminophen in a 24 hr period. Max dose of 2gm for AST/ALT greater than 120 units/L    If given for fever, use fever parameter: fever greater than 100.4 °F.    If given for pain, use the following pain scale:   Mild Pain = Pain Score of 1-3, CPOT 1-2  Moderate Pain = Pain Score of 4-6, CPOT 3-4  Severe Pain = Pain Score of 7-10, CPOT 5-8          atorvastatin (LIPITOR) tablet 40 mg  Dose: 40 mg  Freq: Daily Route: PO  Start: 04/06/22 1300   Admin Instructions:   Avoid grapefruit juice.    (0829)-Not Given          0817-Given          0819-Given             calcium carbonate (TUMS) chewable tablet 500 mg (200 mg elemental)  Dose: 2 tablet  Freq: 2 Times Daily PRN  Route: PO  PRN Reason: Heartburn  Start: 04/06/22 0323   Admin Instructions:   One tablet contains 200 mg elemental calcium.  Take with food.          dextrose (D50W) (25 g/50 mL) IV injection 25 g  Dose: 25 g  Freq: Every 15 Minutes PRN Route: IV  PRN Reason: Low Blood Sugar  PRN Comment: Blood Sugar Less Than 70  Start: 04/06/22 0924   Admin Instructions:   Blood sugar less than 70; patient has IV access - Unresponsive, NPO or Unable To Safely Swallow     0605-Given              dextrose (GLUTOSE) oral gel 15 g  Dose: 15 g  Freq: Every 15 Minutes PRN Route: PO  PRN Reason: Low Blood Sugar  PRN Comment: Blood sugar less than 70  Start: 04/06/22 0924   Admin Instructions:   BS<70, Patient Alert, Is not NPO, Can safely swallow.          digoxin (LANOXIN) tablet 62.5 mcg  Dose: 62.5 mcg  Freq: Daily Digoxin Route: PO  Start: 04/06/22 1200   Admin Instructions:    Check and record heart rate.    1820-Given          1159-Given          1200             diphenhydrAMINE (BENADRYL) capsule 25 mg  Dose: 25 mg  Freq: 2 Times Daily PRN Route: PO  PRN Reasons: Itching,Allergies,Sleep  Start: 04/06/22 1053   Admin Instructions:   Caution: Look alike/sound alike drug alert. This med may be ordered in other forms and routes. Before giving verify the last time the drug was given by any route/form.            enoxaparin (LOVENOX) syringe 70 mg  Dose: 1 mg/kg  Weight Dosing Info: 68 kg  Freq: 2 Times Daily Route: SC  Indications of Use: ATRIAL FIBRILLATION,PRESENCE OF MECHANICAL PROSTHETIC HEART VALVE  Start: 04/12/22 2100   Admin Instructions:   Give subcutaneous in abdomen only. Do not massage site after injection.     2150-Given          0821-Given     2100            famotidine (PEPCID) tablet 20 mg  Dose: 20 mg  Freq: 2 Times Daily Before Meals Route: PO  Start: 04/06/22 1730   End: 05/04/22 1729    0546-Given     0730-Canceled Entry     1820-Given        0817-Given     1627-Given     1730-Canceled Entry [C]         0821-Given     1730            ferrous sulfate tablet 325 mg  Dose: 325 mg  Freq: Every Other Day Route: PO  Start: 04/06/22 1300   Admin Instructions:   Swallow whole. Do not crush, split, or chew. Take with food if GI upset occurs.     0817-Given              glucagon (human recombinant) (GLUCAGEN DIAGNOSTIC) injection 1 mg  Dose: 1 mg  Freq: Every 15 Minutes PRN Route: SC  PRN Reason: Low Blood Sugar  PRN Comment: Blood Glucose Less Than 70  Start: 04/06/22 0924   Admin Instructions:   Blood Glucose Less Than 70 - Patient Without IV Access - Unresponsive, NPO or Unable To Safely Swallow          insulin glargine (LANTUS, SEMGLEE) injection 15 Units  Dose: 15 Units  Freq: Nightly Route: SC  Start: 04/09/22 2100   Admin Instructions:       2059-Given          2151-Given          2100             insulin lispro (ADMELOG) injection 0-9 Units  Dose: 0-9 Units  Freq: 3 Times Daily Before Meals Route: SC  Start: 04/06/22 0926   Admin Instructions:   Correction - Moderate Dose.  40-60 units/day total insulin dose or average weight, on oral agents    Blood glucose 150-199 mg/dL - 2 units  Blood glucose 200-249 mg/dL - 4 units  Blood glucose 250-299 mg/dL - 6 units  Blood glucose 300-349 mg/dL - 7 units  Blood glucose 350-400 mg/dL - 8 units  Blood glucose greater than 400 mg/dL - 9 units and call provider   Caution: Look alike/sound alike drug alert    (0829)-Not Given     (1811)-Not Given     1820-Given        (0817)-Not Given     1200-Given     (1658)-Not Given        (0757)-Not Given     1130     1730           lactobacillus acidophilus (RISAQUAD) capsule 1 capsule  Dose: 1 capsule  Freq: Daily Route: PO  Start: 04/06/22 1300   End: 05/04/22 0859    (0835)-Not Given          0817-Given          0821-Given             magnesium oxide (MAG-OX) tablet 400 mg  Dose: 400 mg  Freq: 2 Times Daily Route: PO  Start: 04/06/22 1300   Admin Instructions:   Each 400mg of magnesium oxide is equal to 241.3mg of elemental magnesium.     (0835)-Not Given     2059-Given         0817-Given     2149-Given         0821-Given     2100            metoprolol succinate XL (TOPROL-XL) 24 hr tablet 12.5 mg  Dose: 12.5 mg  Freq: Daily Route: PO  Start: 04/06/22 1300   Admin Instructions:   Do not crush or chew.    1026-Given          0820-Given          0819-Given             nitroglycerin (NITROSTAT) SL tablet 0.4 mg  Dose: 0.4 mg  Freq: Every 5 Minutes PRN Route: SL  PRN Reason: Chest Pain  PRN Comment: Only if SBP Greater Than 100  Start: 04/08/22 1718   Admin Instructions:   If Pain Unrelieved After 3 Doses Notify MD          ondansetron (ZOFRAN) tablet 4 mg  Dose: 4 mg  Freq: Every 6 Hours PRN Route: PO  PRN Reasons: Nausea,Vomiting  Start: 04/06/22 0323   Admin Instructions:   If BOTH ondansetron (ZOFRAN) and promethazine (PHENERGAN) are ordered use ondansetron first and THEN promethazine IF ondansetron is ineffective.         Or  ondansetron (ZOFRAN) injection 4 mg  Dose: 4 mg  Freq: Every 6 Hours PRN Route: IV  PRN Reasons: Nausea,Vomiting  Start: 04/06/22 0323   Admin Instructions:   If BOTH ondansetron (ZOFRAN) and promethazine (PHENERGAN) are ordered use ondansetron first and THEN promethazine IF ondansetron is ineffective.          Pharmacy to Dose enoxaparin (LOVENOX)  Freq: Continuous PRN Route: XX  PRN Reason: Consult  Indications of Use: ATRIAL FIBRILLATION,PRESENCE OF MECHANICAL PROSTHETIC HEART VALVE  Start: 04/12/22 1920          sodium chloride 0.9 % flush 10 mL  Dose: 10 mL  Freq: As Needed Route: IV  PRN Reason: Line Care  Start: 04/06/22 0319          sodium chloride 0.9 % flush 10 mL  Dose: 10 mL  Freq: Every 12 Hours Scheduled Route: IV  Start: 04/06/22 0900    1808-Canceled Entry     2059-Given         0817-Given     2150-Given         0822-Given     2100            sodium chloride 0.9 % flush 10 mL  Dose: 10 mL  Freq: As Needed Route: IV  PRN Reason: Line Care  Start: 04/05/22 2255          sodium chloride 0.9 % flush 10 mL  Dose:  10 mL  Freq: As Needed Route: IV  PRN Reason: Line Care  Start: 04/05/22 2250          vancomycin (VANCOCIN) capsule 125 mg  Dose: 125 mg  Freq: Every Other Day Route: PO  Indications of Use: CLOSTRIDIOIDES DIFFICILE COLITIS  Start: 04/06/22 1055   End: 04/16/22 0859     0817-Given             Completed Medications  Medications 04/11/22 04/12/22 04/13/22       fentaNYL citrate (PF) (SUBLIMAZE) injection  Freq: Code / Trauma / Sedation Medication Route: IV  Start: 04/11/22 1259   End: 04/11/22 1259    1259-Given               lidocaine (XYLOCAINE) 1 % injection 20 mL  Dose: 20 mL  Freq: Once Route: ID  Start: 04/11/22 1300   End: 04/11/22 1300    1300-Given by Other [C]               midazolam (VERSED) injection  Freq: Code / Trauma / Sedation Medication Route: IV  Start: 04/11/22 1258   End: 04/11/22 1258    1258-Given               ondansetron (ZOFRAN) injection 4 mg  Dose: 4 mg  Freq: Once Route: IV  Start: 04/06/22 0133   End: 04/06/22 0135          sodium chloride 0.9 % infusion  Freq: Code / Trauma / Sedation Continuous Med Route: IV  Start: 04/11/22 1251   End: 04/11/22 1251    1251-New Bag     1312-Stopped             Discontinued Medications  Medications 04/11/22 04/12/22 04/13/22       aspirin EC tablet 81 mg  Dose: 81 mg  Freq: Daily Route: PO  Start: 04/06/22 1300   End: 04/06/22 1354   Admin Instructions:   Herbal/drug interaction: Avoid use with ginkgo biloba. Do not crush or chew.  Do not exceed 4 grams of aspirin in a 24 hr period.    If given for pain, use the following pain scale:   Mild Pain = Pain Score of 1-3, CPOT 1-2  Moderate Pain = Pain Score of 4-6, CPOT 3-4  Severe Pain = Pain Score of 7-10, CPOT 5-8          furosemide (LASIX) tablet 40 mg  Dose: 40 mg  Freq: Daily Route: PO  Start: 04/06/22 1055   End: 04/08/22 0628                      Physician Progress Notes (last 48 hours)      Robb Baldwin MD at 04/12/22 1921          DAILY PROGRESS NOTE  [facility]   "[place]    Patient Identification:  Name: Francisco Sequeira  Age: 86 y.o.  Sex: male  :  1935  MRN: [id number]         Primary Care Physician: Rubin Hinkle APRN      Subjective  No new complaints.  Overall feels about the same.    Objective:  General Appearance:  Comfortable, in no acute distress and not in pain (Thin, frail.).    Vital signs: (most recent): Blood pressure 114/67, pulse 73, temperature 98.1 °F (36.7 °C), temperature source Oral, resp. rate 18, height 182.9 cm (72\"), weight 68 kg (150 lb), SpO2 98 %.    Lungs:  Normal effort and normal respiratory rate.  Breath sounds clear to auscultation.    Heart: Normal rate.  Irregular rhythm.  (Prosthetic click.)  Neurological: Patient is alert and oriented to person, place and time.  (Still with significant left lower extremity weakness.  About 3/5.).    Skin:  Warm and dry.                Vital signs in last 24 hours:  Temp:  [97.7 °F (36.5 °C)-98.1 °F (36.7 °C)] 98.1 °F (36.7 °C)  Heart Rate:  [73-90] 73  Resp:  [16-18] 18  BP: (114-126)/(67-74) 114/67    Intake/Output:    Intake/Output Summary (Last 24 hours) at 2022 1921  Last data filed at 2022 1825  Gross per 24 hour   Intake 740 ml   Output --   Net 740 ml         Results from last 7 days   Lab Units 22  1157 22  1123 04/10/22  0939 22  0914 22  0151 22  1613 22  1234 22  1653 22  0951   WBC 10*3/mm3 5.56 5.50 5.73 6.28 8.37  --  10.81*  --  7.40   HEMOGLOBIN g/dL 8.7* 8.8* 8.3* 6.7* 7.4* 7.2* 8.1*   < > 7.8*  7.8*   PLATELETS 10*3/mm3 273 288 262 234 238  --  270  --  213    < > = values in this interval not displayed.     Results from last 7 days   Lab Units 22  1157 22  1123 04/10/22  0939 22  0914 22  0151 22  1234 22  0951   SODIUM mmol/L 134* 136 136 135* 139 138 138   POTASSIUM mmol/L 4.6 4.5 4.7 4.5 4.2 4.4 4.2   CHLORIDE mmol/L 102 103 100 100 101 100 101   CO2 mmol/L 24.7 26.0 27.0 28.0 " 30.0* 29.2* 32.0*   BUN mg/dL 16 13 14 14 13 13 11   CREATININE mg/dL 1.19 1.15 1.32* 1.26 1.28* 1.19 1.14   GLUCOSE mg/dL 159* 103* 203* 220* 165* 206* 161*   Estimated Creatinine Clearance: 44.4 mL/min (by C-G formula based on SCr of 1.19 mg/dL).  Results from last 7 days   Lab Units 04/12/22  1157 04/11/22  1123 04/10/22  0939 04/09/22  0914 04/08/22  0151 04/07/22  1234 04/06/22  0951 04/06/22  0002   CALCIUM mg/dL 8.3* 8.0* 8.2* 7.8* 8.0* 8.5* 8.1* 8.5*   ALBUMIN g/dL  --   --   --   --   --   --   --  2.70*   MAGNESIUM mg/dL 2.2 2.1 2.4 2.2 2.0 2.0  --   --    PHOSPHORUS mg/dL 3.1 3.3 2.9 3.0 3.1 2.7  --   --      Results from last 7 days   Lab Units 04/06/22  0002   ALBUMIN g/dL 2.70*   BILIRUBIN mg/dL 0.6   ALK PHOS U/L 131*   AST (SGOT) U/L 14   ALT (SGPT) U/L 11       Assessment:  Hematoma of iliopsoas muscle, left, initial encounter: Associated with Lovenox and aspirin.  Orthopedic input appreciated.  Status post IR aspiration.  Only 1 cc aspirated.  Hemoglobin has been stable or improving for 3 days now.  Will resume Lovenox.  If he tolerates this over the next 24 to 48 hours and also resume aspirin and Coumadin.    Acute blood loss anemia: Secondary to above.  .  Good response to 1 unit packed RBCs.    Presently remaining stable, slowly improving..    Atrial fibrillation (HCC): Rate controlled.  Resume Lovenox.    H/O heart valve replacement with mechanical valve: Resume Lovenox.    Hypertension:    Type 2 diabetes mellitus (HCC): Continue present regime.    Stage 3b chronic kidney disease (HCC)    Chronic anticoagulation: See above.    History of CVA (cerebrovascular accident)    Recent history of laparoscopic cholecystectomy         Plan:  Please see above.  Discussed resuming Lovenox with patient.    Robb Baldwin MD  4/12/2022  19:21 EDT      Electronically signed by Robb Baldwin MD at 04/12/22 1925     Derian Dozier APRN at 04/12/22 0636     Attestation signed by Ankit  MD Maxi at 04/12/22 1721    I have reviewed this documentation and agree.                           LOS: 6 days     Subjective :   Patient seen and examined.  He is resting comfortably in his hospital bed.  He is easily aroused from sleep and responds appropriately to questioning.  He states continuing improvement of his symptoms.  At present, he denies any decreased sensation to the anterior thigh.  Still with weakness.  He did undergo aspiration of psoas hematoma with interventional radiology yesterday.  No reported complication.    Objective :    Vital signs in last 24 hours:  Vitals:    04/11/22 1330 04/11/22 1400 04/11/22 1927 04/11/22 2258   BP: 131/67 112/68 121/69 126/69   BP Location: Right arm Right arm Right arm Right arm   Patient Position: Lying Lying Lying Lying   Pulse: 78 81 89 86   Resp: 17 17 16 16   Temp:  97.7 °F (36.5 °C) 98 °F (36.7 °C) 98.1 °F (36.7 °C)   TempSrc:  Oral Oral Oral   SpO2: 100% 100% 95% 100%   Weight:       Height:           PHYSICAL EXAM:  Patient is calm, in no acute distress, awake and oriented x 3.  Left lower extremity with decreased strength in hip flexion.  He is unable to maintain hip flexion when passively placed at about 40 degrees.  Visible muscle contractions are more robust when compared with previous examination.  Strength rated at 2/5.  Subjective report of sensation to the anterior thigh the patient states is equal to light touch bilaterally.  Compartments soft.  Dorsalis pedis pulse palpable at 2+.  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  Hemoglobin is trending appropriately as of the most recent visible value.    He also continues to have slow improvement in the symptoms of his femoral nerve palsy.  We did, again discuss that full recovery could take quite a long time and that he may not ever reach 100% of his previous strength, but that he will likely continue to do well overall.  He will benefit from continued physical therapy.    Physical Therapy -  increase mobility and range of motion as tolerated - WBAT     No further interventions are planned.  Orthopedics will sign off at this point.  Please call with any questions or concerns.  [phone]    Thank you for the opportunity to participate in this patient's care    Derian Dozier, LIZA    Date: 4/12/2022  Time: 06:36 EDT    Electronically signed by Maxi Pham MD at 04/12/22 8163

## 2022-04-14 LAB
ANION GAP SERPL CALCULATED.3IONS-SCNC: 7 MMOL/L (ref 5–15)
BACTERIA FLD CULT: NORMAL
BUN SERPL-MCNC: 14 MG/DL (ref 8–23)
BUN/CREAT SERPL: 13.9 (ref 7–25)
CALCIUM SPEC-SCNC: 8.2 MG/DL (ref 8.6–10.5)
CHLORIDE SERPL-SCNC: 103 MMOL/L (ref 98–107)
CO2 SERPL-SCNC: 24 MMOL/L (ref 22–29)
CREAT SERPL-MCNC: 1.01 MG/DL (ref 0.76–1.27)
DEPRECATED RDW RBC AUTO: 49 FL (ref 37–54)
EGFRCR SERPLBLD CKD-EPI 2021: 73.3 ML/MIN/1.73
ERYTHROCYTE [DISTWIDTH] IN BLOOD BY AUTOMATED COUNT: 15.9 % (ref 12.3–15.4)
GLUCOSE BLDC GLUCOMTR-MCNC: 125 MG/DL (ref 70–130)
GLUCOSE BLDC GLUCOMTR-MCNC: 181 MG/DL (ref 70–130)
GLUCOSE BLDC GLUCOMTR-MCNC: 81 MG/DL (ref 70–130)
GLUCOSE BLDC GLUCOMTR-MCNC: 94 MG/DL (ref 70–130)
GLUCOSE SERPL-MCNC: 159 MG/DL (ref 65–99)
GRAM STN SPEC: NORMAL
GRAM STN SPEC: NORMAL
HCT VFR BLD AUTO: 30.5 % (ref 37.5–51)
HGB BLD-MCNC: 9.3 G/DL (ref 13–17.7)
MAGNESIUM SERPL-MCNC: 2.2 MG/DL (ref 1.6–2.4)
MCH RBC QN AUTO: 26.1 PG (ref 26.6–33)
MCHC RBC AUTO-ENTMCNC: 30.5 G/DL (ref 31.5–35.7)
MCV RBC AUTO: 85.4 FL (ref 79–97)
PHOSPHATE SERPL-MCNC: 2.8 MG/DL (ref 2.5–4.5)
PLATELET # BLD AUTO: 271 10*3/MM3 (ref 140–450)
PMV BLD AUTO: 10.7 FL (ref 6–12)
POTASSIUM SERPL-SCNC: 4.1 MMOL/L (ref 3.5–5.2)
RBC # BLD AUTO: 3.57 10*6/MM3 (ref 4.14–5.8)
SODIUM SERPL-SCNC: 134 MMOL/L (ref 136–145)
WBC NRBC COR # BLD: 5.01 10*3/MM3 (ref 3.4–10.8)

## 2022-04-14 PROCEDURE — 97530 THERAPEUTIC ACTIVITIES: CPT

## 2022-04-14 PROCEDURE — 84100 ASSAY OF PHOSPHORUS: CPT | Performed by: STUDENT IN AN ORGANIZED HEALTH CARE EDUCATION/TRAINING PROGRAM

## 2022-04-14 PROCEDURE — 85027 COMPLETE CBC AUTOMATED: CPT | Performed by: STUDENT IN AN ORGANIZED HEALTH CARE EDUCATION/TRAINING PROGRAM

## 2022-04-14 PROCEDURE — 80048 BASIC METABOLIC PNL TOTAL CA: CPT | Performed by: STUDENT IN AN ORGANIZED HEALTH CARE EDUCATION/TRAINING PROGRAM

## 2022-04-14 PROCEDURE — 25010000002 ENOXAPARIN PER 10 MG: Performed by: HOSPITALIST

## 2022-04-14 PROCEDURE — 63710000001 INSULIN LISPRO (HUMAN) PER 5 UNITS: Performed by: NURSE PRACTITIONER

## 2022-04-14 PROCEDURE — 83735 ASSAY OF MAGNESIUM: CPT | Performed by: STUDENT IN AN ORGANIZED HEALTH CARE EDUCATION/TRAINING PROGRAM

## 2022-04-14 PROCEDURE — 99232 SBSQ HOSP IP/OBS MODERATE 35: CPT | Performed by: INTERNAL MEDICINE

## 2022-04-14 PROCEDURE — 36415 COLL VENOUS BLD VENIPUNCTURE: CPT | Performed by: STUDENT IN AN ORGANIZED HEALTH CARE EDUCATION/TRAINING PROGRAM

## 2022-04-14 PROCEDURE — 82962 GLUCOSE BLOOD TEST: CPT

## 2022-04-14 PROCEDURE — 97110 THERAPEUTIC EXERCISES: CPT

## 2022-04-14 RX ADMIN — ATORVASTATIN CALCIUM 40 MG: 20 TABLET, FILM COATED ORAL at 08:17

## 2022-04-14 RX ADMIN — FAMOTIDINE 20 MG: 20 TABLET ORAL at 18:54

## 2022-04-14 RX ADMIN — FERROUS SULFATE TAB 325 MG (65 MG ELEMENTAL FE) 325 MG: 325 (65 FE) TAB at 08:18

## 2022-04-14 RX ADMIN — Medication 10 ML: at 08:19

## 2022-04-14 RX ADMIN — METOPROLOL SUCCINATE 12.5 MG: 25 TABLET, EXTENDED RELEASE ORAL at 08:18

## 2022-04-14 RX ADMIN — MAGNESIUM OXIDE 400 MG (241.3 MG MAGNESIUM) TABLET 400 MG: TABLET at 20:54

## 2022-04-14 RX ADMIN — MAGNESIUM OXIDE 400 MG (241.3 MG MAGNESIUM) TABLET 400 MG: TABLET at 08:18

## 2022-04-14 RX ADMIN — Medication 10 ML: at 20:55

## 2022-04-14 RX ADMIN — ENOXAPARIN SODIUM 70 MG: 100 INJECTION SUBCUTANEOUS at 08:18

## 2022-04-14 RX ADMIN — DIGOXIN 62.5 MCG: 125 TABLET ORAL at 12:33

## 2022-04-14 RX ADMIN — INSULIN LISPRO 2 UNITS: 100 INJECTION, SOLUTION INTRAVENOUS; SUBCUTANEOUS at 12:33

## 2022-04-14 RX ADMIN — VANCOMYCIN HYDROCHLORIDE 125 MG: 125 CAPSULE ORAL at 08:18

## 2022-04-14 RX ADMIN — ENOXAPARIN SODIUM 70 MG: 100 INJECTION SUBCUTANEOUS at 20:54

## 2022-04-14 RX ADMIN — Medication 1 CAPSULE: at 08:17

## 2022-04-14 RX ADMIN — FAMOTIDINE 20 MG: 20 TABLET ORAL at 06:28

## 2022-04-14 NOTE — PLAN OF CARE
Problem: Fall Injury Risk  Goal: Absence of Fall and Fall-Related Injury  Intervention: Promote Injury-Free Environment  Description: Provide a safe, barrier-free environment that encourages independent activity.  Keep care area uncluttered and well-lighted.  Determine need for increased observation or monitoring.  Avoid use of devices that minimize mobility, such as restraints or indwelling urinary catheter.  Recent Flowsheet Documentation  Taken 4/14/2022 0425 by Zafar Kelley RN  Safety Promotion/Fall Prevention:   activity supervised   clutter free environment maintained   safety round/check completed  Taken 4/14/2022 0255 by Zafar Kelley RN  Safety Promotion/Fall Prevention:   activity supervised   clutter free environment maintained   safety round/check completed  Taken 4/14/2022 0029 by Zafar Kelley RN  Safety Promotion/Fall Prevention:   activity supervised   room organization consistent   safety round/check completed   clutter free environment maintained  Taken 4/13/2022 2248 by Zafar Kelley RN  Safety Promotion/Fall Prevention:   activity supervised   assistive device/personal items within reach   clutter free environment maintained   safety round/check completed  Taken 4/13/2022 2056 by Zafar Kelley RN  Safety Promotion/Fall Prevention:   activity supervised   assistive device/personal items within reach   clutter free environment maintained   Goal Outcome Evaluation:  Patient resting comfortable in bed most of the night, no complaints of pain or discomfort during shift. Vitals stable, call light in reach, and bed in lowest position.

## 2022-04-14 NOTE — PLAN OF CARE
Goal Outcome Evaluation:  Plan of Care Reviewed With: patient, spouse  Outcome Evaluation: Pt was found supine in bed with wife present. He sits EOB with ModA, doffs/dons socks with assist to place and hold L leg in fig-4 position for LB dressing. He stands with ModAx2, demo's strong posterior leaning today with difficulty correcting/weight shifting forward. Mod-maxA to take lateral steps towards HOB with HHA and L knee blocked d/t buckling. RN notified of pressure sore on upper back. Pt was left with all needs in reach.    Therapist used appropriate personal protective equipment including mask, gloves, and eye protection. Hand hygiene was completed before and after therapy session.

## 2022-04-14 NOTE — THERAPY TREATMENT NOTE
Patient Name: Francisco Sequeira  : 1935    MRN: [id number]                              Today's Date: 2022       Admit Date: 2022    Visit Dx:     ICD-10-CM ICD-9-CM   1. Hematoma of iliopsoas muscle, left, initial encounter  S70.12XA 924.00   2. Chronic anticoagulation  Z79.01 V58.61   3. H/O heart valve replacement with mechanical valve  Z95.2 V43.3   4. Immobility syndrome  M62.3 728.3   5. Anemia, unspecified type  D64.9 285.9   6. 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)   • Iron deficiency anemia   • Chronic anticoagulation   • Hemiparesis of left nondominant side as late effect of cerebral infarction (HCC)   • Rectus sheath hematoma   • Sepsis (HCC)   • Calculus of gallbladder with acute cholecystitis without obstruction   • History of Clostridium difficile infection   • Aspiration pneumonitis (HCC)   • Hematoma of iliopsoas muscle, left, initial encounter   • History of CVA (cerebrovascular accident)   • S/P laparoscopic cholecystectomy   • Anemia     Past Medical History:   Diagnosis Date   • Allergic rhinitis    • Aortic valve insufficiency    • Ascending aortic aneurysm (HCC)    • Atrial fibrillation (HCC)    • Bacteremia    • Calcific aortic stenosis of bicuspid valve    • Cardiac arrest (HCC)    • Cardiomyopathy (HCC)    • CKD (chronic kidney disease) stage 3, GFR 30-59 ml/min (HCC)    • Contact dermatitis due to poison ivy    • Elbow fracture    • Esophageal reflux    • GERD (gastroesophageal reflux disease)    • Head  injury    • History of transfusion    • Hyperglycemia    • Hyperlipidemia    • Hypertension    • Kidney carcinoma (HCC)    • Nonischemic cardiomyopathy (HCC)    • Renal oncocytoma    • Seasonal allergic reaction    • Sinusitis    • Syncope    • Type 2 diabetes mellitus (HCC)     uncontrolled   • Visual field defect      Past Surgical History:   Procedure Laterality Date   • AORTIC VALVE REPAIR/REPLACEMENT     • ASCENDING AORTIC ANEURYSM REPAIR W/ MECHANICAL AORTIC VALVE REPLACEMENT     • CHOLECYSTECTOMY WITH INTRAOPERATIVE CHOLANGIOGRAM N/A 3/29/2022    Procedure: Laparoscopic cholecystectomy with cholangiogram, possible open;  Surgeon: Lisa Guidry MD;  Location: [facility] MAIN OR;  Service: General;  Laterality: N/A;   • COLONOSCOPY N/A 10/28/2021    Procedure: COLONOSCOPY to cecum:  cold snare polyps,;  Surgeon: Dinesh Meza MD;  Location: [facility] ENDOSCOPY;  Service: Gastroenterology;  Laterality: N/A;  pre:  Iron deficiency anemia  post:  polyps, diverticulosis,    • COLONOSCOPY N/A 11/9/2021    Procedure: COLONOSCOPY to cecum with APC cautery of AVM and clip placement x1;  Surgeon: Pavan Rodriguez MD;  Location: [facility] ENDOSCOPY;  Service: Gastroenterology;  Laterality: N/A;  PRE - gi bleed, anemia  POST - diverticulosis, poor prep, AVM right colon   • ENDOSCOPY N/A 10/28/2021    Procedure: ESOPHAGOGASTRODUODENOSCOPY with biopsies;  Surgeon: Dinesh Meza MD;  Location: [facility] ENDOSCOPY;  Service: Gastroenterology;  Laterality: N/A;  pre:  Iron deficiency anemia  post:  duodenitis and gastritis   • EYE SURGERY  12/09/2020    cataract surgery    • NEPHRECTOMY     • OTHER SURGICAL HISTORY      elbow surgery   • OTHER SURGICAL HISTORY      right arm surgery   • PROSTATE SURGERY     • THORACENTESIS Left     diagnostic      General Information     Row Name 04/14/22 1307          OT Time and Intention    Document Type therapy note (daily note)  -JW     Mode of Treatment  co-treatment;occupational therapy;physical therapy  -     Row Name 04/14/22 1307          General Information    Patient Profile Reviewed yes  -     Existing Precautions/Restrictions fall  -     Row Name 04/14/22 1307          Cognition    Orientation Status (Cognition) oriented x 3  -[facility] Name 04/14/22 1307          Safety Issues, Functional Mobility    Impairments Affecting Function (Mobility) balance;postural/trunk control;strength;sensation/sensory awareness;range of motion (ROM);pain;motor control;endurance/activity tolerance  -           User Key  (r) = Recorded By, (t) = Taken By, (c) = Cosigned By    Initials Name Provider Type     Sujey Herron OT Occupational Therapist                 Mobility/ADL's     Row Name 04/14/22 1307          Bed Mobility    Bed Mobility supine-sit;sit-supine  -     Supine-Sit [place] (Bed Mobility) moderate assist (50% patient effort);2 person assist;verbal cues  -     Sit-Supine [place] (Bed Mobility) contact guard;nonverbal cues (demo/gesture)  -     Bed Mobility, Safety Issues decreased use of legs for bridging/pushing  -     Assistive Device (Bed Mobility) bed rails;head of bed elevated  -     Comment, (Bed Mobility) RLE hooks LLE when returning to supine in bed  -[facility] Name 04/14/22 1307          Transfers    Sit-Stand [place] (Transfers) moderate assist (50% patient effort);2 person assist;verbal cues;nonverbal cues (demo/gesture)  -[facility] Name 04/14/22 1307          Sit-Stand Transfer    Assistive Device (Sit-Stand Transfers) --  HHA  -[facility] Name 04/14/22 1307          Functional Mobility    Functional Mobility- Ind. Level moderate assist (50% patient effort);verbal cues required;2 person assist required  -     Functional Mobility- Comment takes ~4 lateral steps towards HOB with ModAx2 and multiple cues for sequencing. Difficulty with LLE coordination  -[facility] Name 04/14/22 1307          Activities of Daily Living     BADL Assessment/Intervention lower body dressing  -     Row Name 04/14/22 1307          Lower Body Dressing Assessment/Training    [place] Level (Lower Body Dressing) moderate assist (50% patient effort)  -     Position (Lower Body Dressing) edge of bed sitting  -     Comment, (Lower Body Dressing) Assist to place and hold LLE in fig-4 position to doff/don L sock  -           User Key  (r) = Recorded By, (t) = Taken By, (c) = Cosigned By    Initials Name Provider Type    Sujey Starkey OT Occupational Therapist               Obj/Interventions     Row Name 04/14/22 1310          Balance    Static Sitting Balance standby assist  -     Dynamic Sitting Balance contact guard  -JW     Position, Sitting Balance unsupported;sitting edge of bed  -     Static Standing Balance moderate assist;maximum assist;2-person assist;verbal cues;non-verbal cues (demo/gesture)  -     Dynamic Standing Balance maximum assist;verbal cues;non-verbal cues (demo/gesture);2-person assist  -     Position/Device Used, Standing Balance supported  -     Comment, Balance strong posterior and left leaning today with difficulty correcting/weight shifting forward  -           User Key  (r) = Recorded By, (t) = Taken By, (c) = Cosigned By    Initials Name Provider Type    Sujey Starkey OT Occupational Therapist               Goals/Plan    No documentation.                Clinical Impression     Row Name 04/14/22 1311          Pain Assessment    Pretreatment Pain Rating 0/10 - no pain  -     Posttreatment Pain Rating 0/10 - no pain  -     Row Name 04/14/22 1311          Plan of Care Review    Plan of Care Reviewed With patient;spouse  -     Outcome Evaluation Pt was found supine in bed with wife present. He sits EOB with ModA, doffs/dons socks with assist to place and hold L leg in fig-4 position for LB dressing. He stands with ModAx2, demo's strong posterior leaning today with difficulty correcting/weight shifting  forward. Mod-maxA to take lateral steps towards HOB with HHA and L knee blocked d/t buckling. RN notified of pressure sore on upper back. Pt was left with all needs in reach.  -     Row Name 04/14/22 1311          Positioning and Restraints    Pre-Treatment Position in bed  -     Post Treatment Position bed  -JW     In Bed notified nsg;fowlers;call light within reach;encouraged to call for assist;exit alarm on;with family/caregiver  -           User Key  (r) = Recorded By, (t) = Taken By, (c) = Cosigned By    Initials Name Provider Type    Sujey Starkey OT Occupational Therapist               Outcome Measures     Row Name 04/14/22 1000          How much help from another person do you currently need...    Turning from your back to your side while in flat bed without using bedrails? 2  -LH     Moving from lying on back to sitting on the side of a flat bed without bedrails? 2  -LH     Moving to and from a bed to a chair (including a wheelchair)? 2  -LH     Standing up from a chair using your arms (e.g., wheelchair, bedside chair)? 2  -LH     Climbing 3-5 steps with a railing? 1  -LH     To walk in hospital room? 2  -     AM-PAC 6 Clicks Score (PT) 11  -     Row Name 04/14/22 1000          Functional Assessment    Outcome Measure Options AM-PAC 6 Clicks Basic Mobility (PT)  -           User Key  (r) = Recorded By, (t) = Taken By, (c) = Cosigned By    Initials Name Provider Type     Sandi Shepard, PT Physical Therapist                Occupational Therapy Education                 Title: PT OT SLP Therapies (In Progress)     Topic: Occupational Therapy (In Progress)     Point: ADL training (Done)     Description:   Instruct learner(s) on proper safety adaptation and remediation techniques during self care or transfers.   Instruct in proper use of assistive devices.              Learning Progress Summary           Patient Acceptance, E, VU by MILLIE at 4/12/2022 1150    Comment: role of OT, plan of care,  safety                   Point: Home exercise program (Not Started)     Description:   Instruct learner(s) on appropriate technique for monitoring, assisting and/or progressing therapeutic exercises/activities.              Learner Progress:  Not documented in this visit.          Point: Precautions (Done)     Description:   Instruct learner(s) on prescribed precautions during self-care and functional transfers.              Learning Progress Summary           Patient Acceptance, E, VU by  at 4/12/2022 1150    Comment: role of OT, plan of care, safety                   Point: Body mechanics (Done)     Description:   Instruct learner(s) on proper positioning and spine alignment during self-care, functional mobility activities and/or exercises.              Learning Progress Summary           Patient Acceptance, E, VU by  at 4/12/2022 1150    Comment: role of OT, plan of care, safety                               User Key     Initials Effective Dates Name Provider Type Discipline     06/10/21 -  Sujey Herron OT Occupational Therapist OT              OT Recommendation and Plan  Planned Therapy Interventions (OT): activity tolerance training, BADL retraining, adaptive equipment training, functional balance retraining, occupation/activity based interventions, patient/caregiver education/training, strengthening exercise, transfer/mobility retraining  Therapy Frequency (OT): 5 times/wk  Plan of Care Review  Plan of Care Reviewed With: patient, spouse  Outcome Evaluation: Pt was found supine in bed with wife present. He sits EOB with ModA, doffs/dons socks with assist to place and hold L leg in fig-4 position for LB dressing. He stands with ModAx2, demo's strong posterior leaning today with difficulty correcting/weight shifting forward. Mod-maxA to take lateral steps towards HOB with HHA and L knee blocked d/t buckling. RN notified of pressure sore on upper back. Pt was left with all needs in reach.     Time  Calculation:    Time Calculation- OT     Row Name 04/14/22 1315             Time Calculation- OT    OT Start Time 0947  -JW      OT Stop Time 1012  -JW      OT Time Calculation (min) 25 min  -JW      Total Timed Code Minutes- OT 25 minute(s)  -JW      OT Received On 04/14/22  -JW      OT - Next Appointment 04/15/22  -JW              Timed Charges    04541 - OT Therapeutic Activity Minutes 25  -JW              Total Minutes    Timed Charges Total Minutes 25  -JW       Total Minutes 25  -JW            User Key  (r) = Recorded By, (t) = Taken By, (c) = Cosigned By    Initials Name Provider Type    Sujey Starkey OT Occupational Therapist              Therapy Charges for Today     Code Description Service Date Service Provider Modifiers Qty    43503340250 HC OT THERAPEUTIC ACT EA 15 MIN 4/14/2022 Sujey Herron OT GO 2               Sujey Herron OT  4/14/2022

## 2022-04-14 NOTE — PAYOR COMM NOTE
"Laura Xie (86 y.o. Male)     Please see attached continued stay review.     REF#[id number]    PLEASE CALL   OR  [phone]    THANK YOU    NAVDEEP LIEBERMAN LPN CCP            Date of Birth   1935    Social Security Number       Address   [address] [place] [address]    Home Phone   [phone]    MRN   [id number]       [place]   [place]    Marital Status                               Admission Date   4/5/22    Admission Type   Emergency    Admitting Provider   Bowen Coughlin MD    Attending Provider   Leobardo Juarez MD    Department, Room/Bed   [address], [address]       Discharge Date       Discharge Disposition       Discharge Destination                               Attending Provider: Leobardo Juarez MD    Allergies: Other, Penicillins, Percocet [Oxycodone-acetaminophen]    Isolation: None   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)   Code Status: CPR   Advance Care Planning Activity    Ht: 182.9 cm (72\")   Wt: 68 kg (150 lb)    Admission Cmt: None   Principal Problem: Hematoma of iliopsoas muscle, left, initial encounter [S70.12XA]                 Active Insurance as of 4/5/2022     Primary Coverage     Payor Plan Insurance Group Employer/Plan Group    ANTHEM BLUE CROSS ANTHEM BLUE CROSS BLUE SHIELD PPO [id number]     Payor Plan Address Payor Plan Phone Number Payor Plan Fax Number Effective Dates    [address] [phone]  1/1/2022 - None Entered    [facility] [address]       Subscriber Name Subscriber Birth Date Member ID       PATRICIA XIE 7/11/1955 [id number]           Secondary Coverage     Payor Plan Insurance Group Employer/Plan Group    MEDICARE MEDICARE A & B      Payor Plan Address Payor Plan Phone Number Payor Plan Fax Number Effective Dates    [address] [phone]  11/1/2002 - None Entered    [facility] [address]       Subscriber Name Subscriber Birth Date Member ID       LAURA XIE 1935 [id number] "                 Emergency Contacts      (Rel.) Home Phone Work Phone Mobile Phone    Gladys Sequeira (Spouse) [phone] -- [phone]    SelinayumikoBruce felipe (Son) [phone] -- [phone]            Oxygen Therapy (last day)     Date/Time SpO2 Device (Oxygen Therapy) Flow (L/min) Oxygen Concentration (%) ETCO2 (mmHg)    22 1303 100 room air -- -- --    22 0709 99 room air -- -- --    22 0014 100 room air -- -- --    22 2340 98 room air -- -- --    22 2001 98 room air -- -- --    22 1845 -- room air -- -- --    22 1640 -- room air -- -- --    22 1500 -- room air -- -- --    22 1455 -- room air -- -- --    22 1313 100 -- -- -- --    22 1240 -- room air -- -- --    22 0820 -- room air -- -- --    22 0713 99 room air -- -- --    22 0450 99 room air -- -- --          Intake & Output (last day)        07 0700  0701  04/15 0700    P.O. 720     Total Intake(mL/kg) 720 (10.6)     Urine (mL/kg/hr) 50 (0) 100 (0.2)    Stool 0 0    Total Output 50 100    Net +670 -100          Urine Unmeasured Occurrence 5 x 2 x    Stool Unmeasured Occurrence 2 x 2 x        Lines, Drains & Airways     Active LDAs     Name Placement date Placement time Site Days    Peripheral IV 22 162 Left Hand 22  1620  Hand  4                Operative/Procedure Notes (last 24 hours)  Notes from 22 1428 through 22 142   No notes of this type exist for this encounter.            Physician Progress Notes (last 24 hours)      Leobardo Juarez MD at 22 1423          DAILY PROGRESS NOTE  [facility]    Patient Identification:  Name: Francisco Sequeira  Age: 86 y.o.  Sex: male  :  1935  MRN: [id number]         Primary Care Physician: Rubin Hinkle APRN    Subjective:  Interval History:His right leg is weak.    Objective:    Scheduled Meds:atorvastatin, 40 mg, Oral, Daily  digoxin, 62.5 mcg,  "Oral, Daily  enoxaparin, 1 mg/kg, Subcutaneous, BID  famotidine, 20 mg, Oral, BID AC  ferrous sulfate, 325 mg, Oral, Every Other Day  insulin glargine, 15 Units, Subcutaneous, Nightly  insulin lispro, 0-9 Units, Subcutaneous, TID AC  lactobacillus acidophilus, 1 capsule, Oral, Daily  magnesium oxide, 400 mg, Oral, BID  metoprolol succinate XL, 12.5 mg, Oral, Daily  sodium chloride, 10 mL, Intravenous, Q12H      Continuous Infusions:     Vital signs in last 24 hours:  Temp:  [97.5 °F (36.4 °C)-98 °F (36.7 °C)] 98 °F (36.7 °C)  Heart Rate:  [] 78  Resp:  [18-20] 20  BP: (106-145)/(55-66) 125/56    Intake/Output:    Intake/Output Summary (Last 24 hours) at 4/14/2022 1423  Last data filed at 4/14/2022 0900  Gross per 24 hour   Intake 360 ml   Output 150 ml   Net 210 ml       Exam:  /56 (BP Location: Right arm, Patient Position: Lying)   Pulse 78   Temp 98 °F (36.7 °C) (Oral)   Resp 20   Ht 182.9 cm (72\")   Wt 68 kg (150 lb)   SpO2 100%   BMI 20.34 kg/m²     General Appearance:    Alert, cooperative, no distress   Head:    Normocephalic, without obvious abnormality, atraumatic   Eyes:       Throat:   Lips, tongue, gums normal   Neck:   Supple, symmetrical, trachea midline, no JVD   Lungs:     Clear to auscultation bilaterally, respirations unlabored   Chest Wall:    No tenderness or deformity    Heart:    Regular rate and rhythm, S1 and S2 normal, no murmur,no  Rub or gallop   Abdomen:     Soft, nontender, bowel sounds active, no masses, no organomegaly    Extremities:   Extremities normal, atraumatic, no cyanosis or edema   Pulses:      Skin:   Skin is warm and dry,  no rashes or palpable lesions   Neurologic:   Right leg weakness      Lab Results (last 72 hours)     Procedure Component Value Units Date/Time    Digoxin Level [[id number]]  (Abnormal) Collected: 04/13/22 0652    Specimen: Blood Updated: 04/13/22 1121     Digoxin 0.50 ng/mL     POC Glucose Once [[id number]]  (Abnormal) Collected: 04/13/22 " 1116    Specimen: Blood Updated: 04/13/22 1119     Glucose 144 mg/dL      Comment: Meter: [id number] : [id number] Alyssa ALEJO       Basic Metabolic Panel [[id number]]  (Abnormal) Collected: 04/13/22 0652    Specimen: Blood Updated: 04/13/22 0750     Glucose 105 mg/dL      BUN 14 mg/dL      Creatinine 0.91 mg/dL      Sodium 136 mmol/L      Potassium 4.4 mmol/L      Chloride 105 mmol/L      CO2 24.0 mmol/L      Calcium 8.3 mg/dL      BUN/Creatinine Ratio 15.4     Anion Gap 7.0 mmol/L      eGFR 83.1 mL/min/1.73      Comment: National Kidney Foundation and American Society of Nephrology (ASN) Task Force recommended calculation based on the Chronic Kidney Disease Epidemiology Collaboration (CKD-EPI) equation refit without adjustment for race.       Narrative:      GFR Normal >60  Chronic Kidney Disease <60  Kidney Failure <15      Phosphorus [[id number]]  (Normal) Collected: 04/13/22 0652    Specimen: Blood Updated: 04/13/22 0750     Phosphorus 3.1 mg/dL     Magnesium [[id number]]  (Normal) Collected: 04/13/22 0652    Specimen: Blood Updated: 04/13/22 0750     Magnesium 2.3 mg/dL     Body Fluid Culture - Aspirate, Psoas [[id number]] Collected: 04/11/22 1305    Specimen: Aspirate from Psoas Updated: 04/13/22 0734     Body Fluid Culture No growth at 2 days     Gram Stain Few (2+) WBCs per low power field      No organisms seen    CBC (No Diff) [[id number]]  (Abnormal) Collected: 04/13/22 0652    Specimen: Blood Updated: 04/13/22 0716     WBC 4.12 10*3/mm3      RBC 3.51 10*6/mm3      Hemoglobin 9.3 g/dL      Hematocrit 29.8 %      MCV 84.9 fL      MCH 26.5 pg      MCHC 31.2 g/dL      RDW 16.3 %      RDW-SD 50.6 fl      MPV 10.7 fL      Platelets 259 10*3/mm3     POC Glucose Once [[id number]]  (Normal) Collected: 04/13/22 0639    Specimen: Blood Updated: 04/13/22 0640     Glucose 101 mg/dL      Comment: Meter: [id number] : [id number] Bee ALEJO       POC Glucose Once [[id number]]  (Normal) Collected: 04/13/22  0236    Specimen: Blood Updated: 04/13/22 0237     Glucose 104 mg/dL      Comment: Meter: [id number] : [id number] Jason Sweeney RN       POC Glucose Once [[id number]]  (Abnormal) Collected: 04/12/22 1950    Specimen: Blood Updated: 04/12/22 1951     Glucose 143 mg/dL      Comment: Meter: [id number] : [id number] Bee Jose NA       POC Glucose Once [[id number]]  (Normal) Collected: 04/12/22 1610    Specimen: Blood Updated: 04/12/22 1612     Glucose 108 mg/dL      Comment: Meter: [id number] : [id number] Abhinav Lomeli MELO       Basic Metabolic Panel [[id number]]  (Abnormal) Collected: 04/12/22 1157    Specimen: Blood Updated: 04/12/22 1233     Glucose 159 mg/dL      BUN 16 mg/dL      Creatinine 1.19 mg/dL      Sodium 134 mmol/L      Potassium 4.6 mmol/L      Chloride 102 mmol/L      CO2 24.7 mmol/L      Calcium 8.3 mg/dL      BUN/Creatinine Ratio 13.4     Anion Gap 7.3 mmol/L      eGFR 60.2 mL/min/1.73      Comment: National Kidney Foundation and American Society of Nephrology (ASN) Task Force recommended calculation based on the Chronic Kidney Disease Epidemiology Collaboration (CKD-EPI) equation refit without adjustment for race.       Narrative:      GFR Normal >60  Chronic Kidney Disease <60  Kidney Failure <15      Phosphorus [[id number]]  (Normal) Collected: 04/12/22 1157    Specimen: Blood Updated: 04/12/22 1233     Phosphorus 3.1 mg/dL     Magnesium [[id number]]  (Normal) Collected: 04/12/22 1157    Specimen: Blood Updated: 04/12/22 1233     Magnesium 2.2 mg/dL     CBC (No Diff) [[id number]]  (Abnormal) Collected: 04/12/22 1157    Specimen: Blood Updated: 04/12/22 1211     WBC 5.56 10*3/mm3      RBC 3.33 10*6/mm3      Hemoglobin 8.7 g/dL      Hematocrit 28.5 %      MCV 85.6 fL      MCH 26.1 pg      MCHC 30.5 g/dL      RDW 16.1 %      RDW-SD 49.5 fl      MPV 10.9 fL      Platelets 273 10*3/mm3     POC Glucose Once [[id number]]  (Abnormal) Collected: 04/12/22 1106    Specimen: Blood Updated: 04/12/22 1108      Glucose 154 mg/dL      Comment: Meter: [id number] : [id number] Abhinav Yani MELO       POC Glucose Once [[id number]]  (Normal) Collected: 04/12/22 0621    Specimen: Blood Updated: 04/12/22 0622     Glucose 92 mg/dL      Comment: Meter: [id number] : [id number] Burrows Nataia NA       POC Glucose Once [[id number]]  (Abnormal) Collected: 04/12/22 0602    Specimen: Blood Updated: 04/12/22 0604     Glucose 37 mg/dL      Comment: RN Notified R and V Meter: [id number] : [id number] Markos Nataia NA       POC Glucose Once [[id number]]  (Abnormal) Collected: 04/12/22 0601    Specimen: Blood Updated: 04/12/22 0602     Glucose 33 mg/dL      Comment: Repeat Test Meter: [id number] : [id number] Markos Nataia NA       POC Glucose Once [[id number]]  (Normal) Collected: 04/11/22 1936    Specimen: Blood Updated: 04/11/22 1938     Glucose 125 mg/dL      Comment: Meter: [id number] : [id number] Markos Nataia NA       POC Glucose Once [[id number]]  (Abnormal) Collected: 04/11/22 1610    Specimen: Blood Updated: 04/11/22 1611     Glucose 172 mg/dL      Comment: RN Notified R and V Meter: [id number] : [id number] Sen ALEJO       Basic Metabolic Panel [[id number]]  (Abnormal) Collected: 04/11/22 1123    Specimen: Blood Updated: 04/11/22 1246     Glucose 103 mg/dL      BUN 13 mg/dL      Creatinine 1.15 mg/dL      Sodium 136 mmol/L      Potassium 4.5 mmol/L      Chloride 103 mmol/L      CO2 26.0 mmol/L      Calcium 8.0 mg/dL      BUN/Creatinine Ratio 11.3     Anion Gap 7.0 mmol/L      eGFR 62.8 mL/min/1.73      Comment: National Kidney Foundation and American Society of Nephrology (ASN) Task Force recommended calculation based on the Chronic Kidney Disease Epidemiology Collaboration (CKD-EPI) equation refit without adjustment for race.       Narrative:      GFR Normal >60  Chronic Kidney Disease <60  Kidney Failure <15      Magnesium [[id number]]  (Normal) Collected: 04/11/22 1123    Specimen: Blood Updated: 04/11/22  1246     Magnesium 2.1 mg/dL     Phosphorus [[id number]]  (Normal) Collected: 04/11/22 1123    Specimen: Blood Updated: 04/11/22 1246     Phosphorus 3.3 mg/dL     CBC (No Diff) [[id number]]  (Abnormal) Collected: 04/11/22 1123    Specimen: Blood Updated: 04/11/22 1230     WBC 5.50 10*3/mm3      RBC 3.28 10*6/mm3      Hemoglobin 8.8 g/dL      Hematocrit 28.2 %      MCV 86.0 fL      MCH 26.8 pg      MCHC 31.2 g/dL      RDW 15.9 %      RDW-SD 49.4 fl      MPV 11.3 fL      Platelets 288 10*3/mm3     POC Glucose Once [[id number]]  (Normal) Collected: 04/11/22 1103    Specimen: Blood Updated: 04/11/22 1105     Glucose 93 mg/dL      Comment: Meter: [id number] : [id number] Sen ALEJO       POC Glucose Once [[id number]]  (Normal) Collected: 04/11/22 0537    Specimen: Blood Updated: 04/11/22 0538     Glucose 98 mg/dL      Comment: Meter: [id number] : [id number] Ceasar Gladys NA       POC Glucose Once [[id number]]  (Normal) Collected: 04/11/22 0241    Specimen: Blood Updated: 04/11/22 0242     Glucose 97 mg/dL      Comment: Meter: [id number] : [id number] Ceasar Gladys NA       POC Glucose Once [[id number]]  (Normal) Collected: 04/10/22 1949    Specimen: Blood Updated: 04/10/22 1950     Glucose 113 mg/dL      Comment: Meter: [id number] : [id number] Jason Sweeney RN       POC Glucose Once [[id number]]  (Abnormal) Collected: 04/10/22 1602    Specimen: Blood Updated: 04/10/22 1603     Glucose 159 mg/dL      Comment: Meter: [id number] : [id number] Leon Reyes Clausia NA           Data Review:  Results from last 7 days   Lab Units 04/14/22  0848 04/13/22  0652 04/12/22  1157   SODIUM mmol/L 134* 136 134*   POTASSIUM mmol/L 4.1 4.4 4.6   CHLORIDE mmol/L 103 105 102   CO2 mmol/L 24.0 24.0 24.7   BUN mg/dL 14 14 16   CREATININE mg/dL 1.01 0.91 1.19   GLUCOSE mg/dL 159* 105* 159*   CALCIUM mg/dL 8.2* 8.3* 8.3*     Results from last 7 days   Lab Units 04/14/22  0848 04/13/22  0652 04/12/22  1157   WBC 10*3/mm3 5.01  4.12 5.56   HEMOGLOBIN g/dL 9.3* 9.3* 8.7*   HEMATOCRIT % 30.5* 29.8* 28.5*   PLATELETS 10*3/mm3 271 259 273             Lab Results   Lab Value Date/Time    TROPONINT 0.030 04/06/2022 0002    TROPONINT 0.018 03/30/2022 1625    TROPONINT 0.027 02/18/2022 2305    TROPONINT 0.011 10/23/2021 1617    TROPONINT 0.017 07/15/2021 2029    TROPONINT <0.010 01/13/2020 0747    TROPONINT <0.010 03/30/2017 0809               Invalid input(s): PROT, LABALBU          Glucose   Date/Time Value Ref Range Status   04/14/2022 1114 181 (H) 70 - 130 mg/dL Final     Comment:     Meter: [id number] : [id number] Alyssa Ramses Nerisa NA   04/14/2022 0542 81 70 - 130 mg/dL Final     Comment:     Meter: [id number] : [id number] Ceasar Gladys NA   04/13/2022 2022 190 (H) 70 - 130 mg/dL Final     Comment:     Meter: [id number] : [id number] Ceasar Gladys NA   04/13/2022 1550 164 (H) 70 - 130 mg/dL Final     Comment:     Meter: [id number] : [id number] Alyssa Ramses Nerisa NA   04/13/2022 1116 144 (H) 70 - 130 mg/dL Final     Comment:     Meter: [id number] : [id number] Alyssa Ramses Nerisa NA   04/13/2022 0639 101 70 - 130 mg/dL Final     Comment:     Meter: [id number] : [id number] Gamboa Damon NA   04/13/2022 0236 104 70 - 130 mg/dL Final     Comment:     Meter: [id number] : [id number] Jason Sweeney RN   04/12/2022 1950 143 (H) 70 - 130 mg/dL Final     Comment:     Meter: [id number] : [id number] Gamboa Damon NA     Results from last 7 days   Lab Units 04/08/22  0151   INR  1.19*       Past Medical History:   Diagnosis Date   • Allergic rhinitis    • Aortic valve insufficiency    • Ascending aortic aneurysm (HCC)    • Atrial fibrillation (HCC)    • Bacteremia    • Calcific aortic stenosis of bicuspid valve    • Cardiac arrest (HCC)    • Cardiomyopathy (HCC)    • CKD (chronic kidney disease) stage 3, GFR 30-59 ml/min ([facility])    • Contact dermatitis due to poison ivy    • Elbow fracture    • Esophageal reflux    • GERD  (gastroesophageal reflux disease)    • Head injury    • History of transfusion    • Hyperglycemia    • Hyperlipidemia    • Hypertension    • Kidney carcinoma (HCC)    • Nonischemic cardiomyopathy (HCC)    • Renal oncocytoma    • Seasonal allergic reaction    • Sinusitis    • Syncope    • Type 2 diabetes mellitus (HCC)     uncontrolled   • Visual field defect        Assessment:  Active Hospital Problems    Diagnosis  POA   • **Hematoma of iliopsoas muscle, left, initial encounter [S70.12XA]  Yes   • History of CVA (cerebrovascular accident) [Z86.73]  Not Applicable   • S/P laparoscopic cholecystectomy [Z90.49]  Not Applicable   • Anemia [D64.9]  Yes   • Chronic anticoagulation [Z79.01]  Not Applicable   • Stage 3b chronic kidney disease (HCC) [N18.32]  Yes   • H/O heart valve replacement with mechanical valve [Z95.2]  Not Applicable   • Type 2 diabetes mellitus (HCC) [E11.9]  Yes   • Hypertension [I10]  Yes   • Atrial fibrillation (HCC) [I48.91]  Yes      Resolved Hospital Problems   No resolved problems to display.       Plan:  Continue with current RX as per cardiology. Restart coumadin soon. Will need SNU for rehab. Follow lab.  [facility] planning. Probably needs a few more days    Leobardo Juarez MD  4/14/2022  14:23 EDT    Electronically signed by Leobardo Juarez MD at 04/14/22 4625       Sandi Shepard, PT    Physical Therapist   Physical Therapy   Plan of Care      Signed   Date of Service:  04/14/22 1026   Creation Time:  04/14/22 1026              Signed              Show:Clear all  [x]Manual[x]Template[]Copied    Added by:  [x]Sandi Shepard, PT      []Mayra for details    Goal Outcome Evaluation:  Plan of Care Reviewed With: spouse, patient  Outcome Evaluation: Pt agreeable and cooperative w skilled PT, pt continues w L quad weakness and altered light touch sensation. Pt req'ed mod/max A x 2/HHA to stand and take side steps  bedside, LLE blocked to prevent buckling. Rec SNU at [facility].        .Patient was wearing a face  mask during this therapy encounter. Therapist used appropriate personal protective equipment including eye protection, mask, and gloves.  Mask used was standard procedure mask. Appropriate PPE was worn during the entire therapy session. Hand hygiene was completed before and after therapy session. Patient is not in enhanced droplet precautions.                      Sujey Herron OT    Occupational Therapist   Occupational Therapy   Plan of Care      Signed   Date of Service:  04/14/22 1314   Creation Time:  04/14/22 1314              Signed              Show:Clear all  [x]Manual[x]Template[]Copied    Added by:  [x]Sujey Herron OT      []Mayra for details    Goal Outcome Evaluation:  Plan of Care Reviewed With: patient, spouse  Outcome Evaluation: Pt was found supine in bed with wife present. He sits EOB with ModA, doffs/dons socks with assist to place and hold L leg in fig-4 position for LB dressing. He stands with ModAx2, demo's strong posterior leaning today with difficulty correcting/weight shifting forward. Mod-maxA to take lateral steps towards HOB with HHA and L knee blocked d/t buckling. RN notified of pressure sore on upper back. Pt was left with all needs in reach.     Therapist used appropriate personal protective equipment including mask, gloves, and eye protection. Hand hygiene was completed before and after therapy session.                Ave Olguin CSW       Case Management   Case Management/Social Work      Signed   Date of Service:  04/13/22 1400   Creation Time:  04/13/22 1400              Signed              Show:Clear all  []Manual[x]Template[]Copied    Added by:  [x]Ave Olguin CSW      []Mayra for details    Continued Stay Note  [facility]     Patient Name: Francisco Sequeira              MRN: [id number]  Today's Date: 4/13/2022                     Admit Date: 4/5/2022             Discharge Plan            Row Name 04/13/22 1400             Plan      Plan SNF  referrals pending      Patient/Family in Agreement with Plan yes      Plan Comments CSW spoke to patient’s spouse over the phone regarding d/c plan. Per request of patient’s spouse, CSW placed additional SNF referrals to [facility], [facility], [facility], [facility], [facility], and [facility] rehab. Patient’s spouse states she is continuing to research SNF’s and is requesting CCP follow up with her tomorrow with bed availability. Patient’s spouse states she is not ready to make a facility determination at this time. SHEY, UZIELW                    Discharge Codes    No documentation.                        Expected Discharge Date and Time      Expected Discharge Date Expected Discharge Time     Apr 15, 2022                  JASON RASHID

## 2022-04-14 NOTE — PLAN OF CARE
Goal Outcome Evaluation:  Plan of Care Reviewed With: spouse, patient           Outcome Evaluation: Pt agreeable and cooperative w skilled PT, pt continues w L quad weakness and altered light touch sensation. Pt req'ed mod/max A x 2/HHA to stand and take side steps  bedside, LLE blocked to prevent buckling. Rec SNU at [place].      .Patient was wearing a face mask during this therapy encounter. Therapist used appropriate personal protective equipment including eye protection, mask, and gloves.  Mask used was standard procedure mask. Appropriate PPE was worn during the entire therapy session. Hand hygiene was completed before and after therapy session. Patient is not in enhanced droplet precautions.

## 2022-04-14 NOTE — PROGRESS NOTES
LOS: 8 days   Patient Care Team:  Rubin Hinkle APRN as PCP - General (Family Medicine)  Audra Vazquez RODDY as Pharmacist  Vasquez Niño, PharmD as Pharmacist (Pharmacy)  Tatiana Tinoco MD as Consulting Physician (Gastroenterology)    Chief Complaint: Follow-up for persistent atrial fibrillation, pauses.    Interval History: He did have recurrent pauses overnight while sleeping.  His overnight oximetry is still pending.  No chest pain or shortness of breath.  He still remains very weak.    Vital Signs:  Temp:  [97.5 °F (36.4 °C)-98 °F (36.7 °C)] 98 °F (36.7 °C)  Heart Rate:  [] 78  Resp:  [18-20] 20  BP: (106-145)/(55-66) 125/56    Intake/Output Summary (Last 24 hours) at 4/14/2022 1427  Last data filed at 4/14/2022 0900  Gross per 24 hour   Intake 360 ml   Output 150 ml   Net 210 ml       Physical Exam:   General Appearance:    No acute distress, alert and oriented x4, thin   Lungs:     Clear to auscultation bilaterally     Heart:   Irregularly irregular rhythm with a normal rate.  Mechanical S2.  II/VI SM RUSB.   Abdomen:     Soft, nontender, nondistended.    Extremities:   No clubbing, cyanosis, or edema.     Results Review:    Results from last 7 days   Lab Units 04/14/22  0848   SODIUM mmol/L 134*   POTASSIUM mmol/L 4.1   CHLORIDE mmol/L 103   CO2 mmol/L 24.0   BUN mg/dL 14   CREATININE mg/dL 1.01   GLUCOSE mg/dL 159*   CALCIUM mg/dL 8.2*         Results from last 7 days   Lab Units 04/14/22  0848   WBC 10*3/mm3 5.01   HEMOGLOBIN g/dL 9.3*   HEMATOCRIT % 30.5*   PLATELETS 10*3/mm3 271     Results from last 7 days   Lab Units 04/08/22  0151   INR  1.19*         Results from last 7 days   Lab Units 04/14/22  0848   MAGNESIUM mg/dL 2.2           I reviewed the patient's new clinical results.        Assessment:  1.  Spontaneous left iliopsoas hematoma secondary to anticoagulation  2.  Mechanical aortic valve replacement  3.  Prior CVA with residual left-sided weakness (INR at 2.2)  4.  Chronic  anticoagulation with goal INR of 3 - 3.5  5.  History of lower GI bleed with hematochezia in November 2021  6.  Right rectus sheath hematoma in November 2021  7.  Persistent atrial fibrillation  8.  History of renal cell carcinoma and right nephrectomy   9.  Chronic kidney disease  10.  Recurrent right pleural effusion, status post multiple thoracenteses  11.  Multifactorial anemia  12.  Diabetes  13.  Deconditioning and weakness  14.  Multiple pauses noted on telemetry of up to 5.8 seconds    Plan:  -Again, he did have recurrent pauses overnight.  He remains completely asymptomatic and these occurred during sleep.  I strongly suspect this is related to undiagnosed obstructive sleep apnea.  His overnight oximetry is still pending from last night.  If this is noted, I will likely ask pulmonology to see him regarding potential treatment in the future.    -No indication for pacemaker at this point.    -Digoxin level yesterday was 0.5.  Continue the low-dose digoxin and Toprol-XL 12.5 mg/day.    -Continue Lovenox for the mechanical aortic valve replacement.  If the left iliopsoas hematoma does not increase today, I would resume warfarin tomorrow.    -He has had a previous stroke with an INR of 2.2.  His goal INR is 3-3.5.    -Increase ambulation and activity as tolerated.   deconditioning and weakness is one of the main issues currently.    Jimenez Mast MD  04/14/22  14:27 EDT

## 2022-04-14 NOTE — PROGRESS NOTES
"DAILY PROGRESS NOTE  [facility]    Patient Identification:  Name: Francisco Sequeira  Age: 86 y.o.  Sex: male  :  1935  MRN: [id number]         Primary Care Physician: Rubin Hinkle APRN    Subjective:  Interval History:His right leg is weak.    Objective:    Scheduled Meds:atorvastatin, 40 mg, Oral, Daily  digoxin, 62.5 mcg, Oral, Daily  enoxaparin, 1 mg/kg, Subcutaneous, BID  famotidine, 20 mg, Oral, BID AC  ferrous sulfate, 325 mg, Oral, Every Other Day  insulin glargine, 15 Units, Subcutaneous, Nightly  insulin lispro, 0-9 Units, Subcutaneous, TID AC  lactobacillus acidophilus, 1 capsule, Oral, Daily  magnesium oxide, 400 mg, Oral, BID  metoprolol succinate XL, 12.5 mg, Oral, Daily  sodium chloride, 10 mL, Intravenous, Q12H      Continuous Infusions:     Vital signs in last 24 hours:  Temp:  [97.5 °F (36.4 °C)-98 °F (36.7 °C)] 98 °F (36.7 °C)  Heart Rate:  [] 78  Resp:  [18-20] 20  BP: (106-145)/(55-66) 125/56    Intake/Output:    Intake/Output Summary (Last 24 hours) at 2022 1423  Last data filed at 2022 0900  Gross per 24 hour   Intake 360 ml   Output 150 ml   Net 210 ml       Exam:  /56 (BP Location: Right arm, Patient Position: Lying)   Pulse 78   Temp 98 °F (36.7 °C) (Oral)   Resp 20   Ht 182.9 cm (72\")   Wt 68 kg (150 lb)   SpO2 100%   BMI 20.34 kg/m²     General Appearance:    Alert, cooperative, no distress   Head:    Normocephalic, without obvious abnormality, atraumatic   Eyes:       Throat:   Lips, tongue, gums normal   Neck:   Supple, symmetrical, trachea midline, no JVD   Lungs:     Clear to auscultation bilaterally, respirations unlabored   Chest Wall:    No tenderness or deformity    Heart:    Regular rate and rhythm, S1 and S2 normal, no murmur,no  Rub or gallop   Abdomen:     Soft, nontender, bowel sounds active, no masses, no organomegaly    Extremities:   Extremities normal, atraumatic, no cyanosis or edema   Pulses:      Skin:   Skin is " warm and dry,  no rashes or palpable lesions   Neurologic:   Right leg weakness      Lab Results (last 72 hours)     Procedure Component Value Units Date/Time    Digoxin Level [[id number]]  (Abnormal) Collected: 04/13/22 0652    Specimen: Blood Updated: 04/13/22 1121     Digoxin 0.50 ng/mL     POC Glucose Once [[id number]]  (Abnormal) Collected: 04/13/22 1116    Specimen: Blood Updated: 04/13/22 1119     Glucose 144 mg/dL      Comment: Meter: [id number] : [id number] Alyssa Ramses ALEJO       Basic Metabolic Panel [[id number]]  (Abnormal) Collected: 04/13/22 0652    Specimen: Blood Updated: 04/13/22 0750     Glucose 105 mg/dL      BUN 14 mg/dL      Creatinine 0.91 mg/dL      Sodium 136 mmol/L      Potassium 4.4 mmol/L      Chloride 105 mmol/L      CO2 24.0 mmol/L      Calcium 8.3 mg/dL      BUN/Creatinine Ratio 15.4     Anion Gap 7.0 mmol/L      eGFR 83.1 mL/min/1.73      Comment: National Kidney Foundation and American Society of Nephrology (ASN) Task Force recommended calculation based on the Chronic Kidney Disease Epidemiology Collaboration (CKD-EPI) equation refit without adjustment for race.       Narrative:      GFR Normal >60  Chronic Kidney Disease <60  Kidney Failure <15      Phosphorus [[id number]]  (Normal) Collected: 04/13/22 0652    Specimen: Blood Updated: 04/13/22 0750     Phosphorus 3.1 mg/dL     Magnesium [[id number]]  (Normal) Collected: 04/13/22 0652    Specimen: Blood Updated: 04/13/22 0750     Magnesium 2.3 mg/dL     Body Fluid Culture - Aspirate, Psoas [[id number]] Collected: 04/11/22 1305    Specimen: Aspirate from Psoas Updated: 04/13/22 0734     Body Fluid Culture No growth at 2 days     Gram Stain Few (2+) WBCs per low power field      No organisms seen    CBC (No Diff) [[id number]]  (Abnormal) Collected: 04/13/22 0652    Specimen: Blood Updated: 04/13/22 0716     WBC 4.12 10*3/mm3      RBC 3.51 10*6/mm3      Hemoglobin 9.3 g/dL      Hematocrit 29.8 %      MCV 84.9 fL      MCH 26.5 pg       MCHC 31.2 g/dL      RDW 16.3 %      RDW-SD 50.6 fl      MPV 10.7 fL      Platelets 259 10*3/mm3     POC Glucose Once [[id number]]  (Normal) Collected: 04/13/22 0639    Specimen: Blood Updated: 04/13/22 0640     Glucose 101 mg/dL      Comment: Meter: [id number] : [id number] Gamboa Damon NA       POC Glucose Once [[id number]]  (Normal) Collected: 04/13/22 0236    Specimen: Blood Updated: 04/13/22 0237     Glucose 104 mg/dL      Comment: Meter: [id number] : [id number] Jason Sweeney RN       POC Glucose Once [[id number]]  (Abnormal) Collected: 04/12/22 1950    Specimen: Blood Updated: 04/12/22 1951     Glucose 143 mg/dL      Comment: Meter: [id number] : [id number] Gamboa Damon NA       POC Glucose Once [[id number]]  (Normal) Collected: 04/12/22 1610    Specimen: Blood Updated: 04/12/22 1612     Glucose 108 mg/dL      Comment: Meter: [id number] : [id number] Abhinav ALEJO       Basic Metabolic Panel [[id number]]  (Abnormal) Collected: 04/12/22 1157    Specimen: Blood Updated: 04/12/22 1233     Glucose 159 mg/dL      BUN 16 mg/dL      Creatinine 1.19 mg/dL      Sodium 134 mmol/L      Potassium 4.6 mmol/L      Chloride 102 mmol/L      CO2 24.7 mmol/L      Calcium 8.3 mg/dL      BUN/Creatinine Ratio 13.4     Anion Gap 7.3 mmol/L      eGFR 60.2 mL/min/1.73      Comment: National Kidney Foundation and American Society of Nephrology (ASN) Task Force recommended calculation based on the Chronic Kidney Disease Epidemiology Collaboration (CKD-EPI) equation refit without adjustment for race.       Narrative:      GFR Normal >60  Chronic Kidney Disease <60  Kidney Failure <15      Phosphorus [[id number]]  (Normal) Collected: 04/12/22 1157    Specimen: Blood Updated: 04/12/22 1233     Phosphorus 3.1 mg/dL     Magnesium [[id number]]  (Normal) Collected: 04/12/22 1157    Specimen: Blood Updated: 04/12/22 1233     Magnesium 2.2 mg/dL     CBC (No Diff) [[id number]]  (Abnormal) Collected: 04/12/22 1157    Specimen: Blood  Updated: 04/12/22 1211     WBC 5.56 10*3/mm3      RBC 3.33 10*6/mm3      Hemoglobin 8.7 g/dL      Hematocrit 28.5 %      MCV 85.6 fL      MCH 26.1 pg      MCHC 30.5 g/dL      RDW 16.1 %      RDW-SD 49.5 fl      MPV 10.9 fL      Platelets 273 10*3/mm3     POC Glucose Once [[id number]]  (Abnormal) Collected: 04/12/22 1106    Specimen: Blood Updated: 04/12/22 1108     Glucose 154 mg/dL      Comment: Meter: [id number] : [id number] Abhinav Lomeli NA       POC Glucose Once [[id number]]  (Normal) Collected: 04/12/22 0621    Specimen: Blood Updated: 04/12/22 0622     Glucose 92 mg/dL      Comment: Meter: [id number] : [id number] Markos Nataia NA       POC Glucose Once [[id number]]  (Abnormal) Collected: 04/12/22 0602    Specimen: Blood Updated: 04/12/22 0604     Glucose 37 mg/dL      Comment: RN Notified R and V Meter: [id number] : [id number] Markos Membrenoaia NA       POC Glucose Once [[id number]]  (Abnormal) Collected: 04/12/22 0601    Specimen: Blood Updated: 04/12/22 0602     Glucose 33 mg/dL      Comment: Repeat Test Meter: [id number] : [id number] Markos Membrenoaia NA       POC Glucose Once [[id number]]  (Normal) Collected: 04/11/22 1936    Specimen: Blood Updated: 04/11/22 1938     Glucose 125 mg/dL      Comment: Meter: [id number] : [id number] Markos Nataia NA       POC Glucose Once [[id number]]  (Abnormal) Collected: 04/11/22 1610    Specimen: Blood Updated: 04/11/22 1611     Glucose 172 mg/dL      Comment: RN Notified R and V Meter: [id number] : [id number] Sen ALEJO       Basic Metabolic Panel [[id number]]  (Abnormal) Collected: 04/11/22 1123    Specimen: Blood Updated: 04/11/22 1246     Glucose 103 mg/dL      BUN 13 mg/dL      Creatinine 1.15 mg/dL      Sodium 136 mmol/L      Potassium 4.5 mmol/L      Chloride 103 mmol/L      CO2 26.0 mmol/L      Calcium 8.0 mg/dL      BUN/Creatinine Ratio 11.3     Anion Gap 7.0 mmol/L      eGFR 62.8 mL/min/1.73      Comment: National Kidney Foundation and American  Society of Nephrology (ASN) Task Force recommended calculation based on the Chronic Kidney Disease Epidemiology Collaboration (CKD-EPI) equation refit without adjustment for race.       Narrative:      GFR Normal >60  Chronic Kidney Disease <60  Kidney Failure <15      Magnesium [[id number]]  (Normal) Collected: 04/11/22 1123    Specimen: Blood Updated: 04/11/22 1246     Magnesium 2.1 mg/dL     Phosphorus [[id number]]  (Normal) Collected: 04/11/22 1123    Specimen: Blood Updated: 04/11/22 1246     Phosphorus 3.3 mg/dL     CBC (No Diff) [[id number]]  (Abnormal) Collected: 04/11/22 1123    Specimen: Blood Updated: 04/11/22 1230     WBC 5.50 10*3/mm3      RBC 3.28 10*6/mm3      Hemoglobin 8.8 g/dL      Hematocrit 28.2 %      MCV 86.0 fL      MCH 26.8 pg      MCHC 31.2 g/dL      RDW 15.9 %      RDW-SD 49.4 fl      MPV 11.3 fL      Platelets 288 10*3/mm3     POC Glucose Once [[id number]]  (Normal) Collected: 04/11/22 1103    Specimen: Blood Updated: 04/11/22 1105     Glucose 93 mg/dL      Comment: Meter: [id number] : [id number] Sen Trent NA       POC Glucose Once [[id number]]  (Normal) Collected: 04/11/22 0537    Specimen: Blood Updated: 04/11/22 0538     Glucose 98 mg/dL      Comment: Meter: [id number] : [id number] Ceasar Gladys NA       POC Glucose Once [[id number]]  (Normal) Collected: 04/11/22 0241    Specimen: Blood Updated: 04/11/22 0242     Glucose 97 mg/dL      Comment: Meter: [id number] : [id number] Ceasar Gladys NA       POC Glucose Once [[id number]]  (Normal) Collected: 04/10/22 1949    Specimen: Blood Updated: 04/10/22 1950     Glucose 113 mg/dL      Comment: Meter: [id number] : [id number] Jason Sweeney RN       POC Glucose Once [[id number]]  (Abnormal) Collected: 04/10/22 1602    Specimen: Blood Updated: 04/10/22 1603     Glucose 159 mg/dL      Comment: Meter: [id number] : [id number] Leon Reyes Clausia NA           Data Review:  Results from last 7 days   Lab Units 04/14/22  0848  04/13/22  0652 04/12/22  1157   SODIUM mmol/L 134* 136 134*   POTASSIUM mmol/L 4.1 4.4 4.6   CHLORIDE mmol/L 103 105 102   CO2 mmol/L 24.0 24.0 24.7   BUN mg/dL 14 14 16   CREATININE mg/dL 1.01 0.91 1.19   GLUCOSE mg/dL 159* 105* 159*   CALCIUM mg/dL 8.2* 8.3* 8.3*     Results from last 7 days   Lab Units 04/14/22  0848 04/13/22  0652 04/12/22  1157   WBC 10*3/mm3 5.01 4.12 5.56   HEMOGLOBIN g/dL 9.3* 9.3* 8.7*   HEMATOCRIT % 30.5* 29.8* 28.5*   PLATELETS 10*3/mm3 271 259 273             Lab Results   Lab Value Date/Time    TROPONINT 0.030 04/06/2022 0002    TROPONINT 0.018 03/30/2022 1625    TROPONINT 0.027 02/18/2022 2305    TROPONINT 0.011 10/23/2021 1617    TROPONINT 0.017 07/15/2021 2029    TROPONINT <0.010 01/13/2020 0747    TROPONINT <0.010 03/30/2017 0809               Invalid input(s): PROT, LABALBU          Glucose   Date/Time Value Ref Range Status   04/14/2022 1114 181 (H) 70 - 130 mg/dL Final     Comment:     Meter: [id number] : [id number] Alyssa Ramses Nerisa NA   04/14/2022 0542 81 70 - 130 mg/dL Final     Comment:     Meter: [id number] : [id number] Ceasar Gladys NA   04/13/2022 2022 190 (H) 70 - 130 mg/dL Final     Comment:     Meter: [id number] : [id number] Ceasar Gladys NA   04/13/2022 1550 164 (H) 70 - 130 mg/dL Final     Comment:     Meter: [id number] : [id number] Alyssa Ramses Nerisa NA   04/13/2022 1116 144 (H) 70 - 130 mg/dL Final     Comment:     Meter: [id number] : [id number] Alyssa Ramses Nerisa NA   04/13/2022 0639 101 70 - 130 mg/dL Final     Comment:     Meter: [id number] : [id number] Bee ALEJO   04/13/2022 0236 104 70 - 130 mg/dL Final     Comment:     Meter: [id number] : [id number] Jason Sweeney RN   04/12/2022 1950 143 (H) 70 - 130 mg/dL Final     Comment:     Meter: [id number] : [id number] Bee ALEJO     Results from last 7 days   Lab Units 04/08/22  0151   INR  1.19*       Past Medical History:   Diagnosis Date   • Allergic rhinitis    • Aortic  valve insufficiency    • Ascending aortic aneurysm (HCC)    • Atrial fibrillation (HCC)    • Bacteremia    • Calcific aortic stenosis of bicuspid valve    • Cardiac arrest (HCC)    • Cardiomyopathy (HCC)    • CKD (chronic kidney disease) stage 3, GFR 30-59 ml/min (HCC)    • Contact dermatitis due to poison ivy    • Elbow fracture    • Esophageal reflux    • GERD (gastroesophageal reflux disease)    • Head injury    • History of transfusion    • Hyperglycemia    • Hyperlipidemia    • Hypertension    • Kidney carcinoma (HCC)    • Nonischemic cardiomyopathy (HCC)    • Renal oncocytoma    • Seasonal allergic reaction    • Sinusitis    • Syncope    • Type 2 diabetes mellitus (HCC)     uncontrolled   • Visual field defect        Assessment:  Active Hospital Problems    Diagnosis  POA   • **Hematoma of iliopsoas muscle, left, initial encounter [S70.12XA]  Yes   • History of CVA (cerebrovascular accident) [Z86.73]  Not Applicable   • S/P laparoscopic cholecystectomy [Z90.49]  Not Applicable   • Anemia [D64.9]  Yes   • Chronic anticoagulation [Z79.01]  Not Applicable   • Stage 3b chronic kidney disease (HCC) [N18.32]  Yes   • H/O heart valve replacement with mechanical valve [Z95.2]  Not Applicable   • Type 2 diabetes mellitus (HCC) [E11.9]  Yes   • Hypertension [I10]  Yes   • Atrial fibrillation (HCC) [I48.91]  Yes      Resolved Hospital Problems   No resolved problems to display.       Plan:  Continue with current RX as per cardiology. Restart coumadin soon. Will need SNU for rehab. Follow lab.  DC planning. Probably needs a few more days    Leobardo Juarez MD  4/14/2022  14:23 EDT

## 2022-04-15 LAB
ANION GAP SERPL CALCULATED.3IONS-SCNC: 6.5 MMOL/L (ref 5–15)
BUN SERPL-MCNC: 12 MG/DL (ref 8–23)
BUN/CREAT SERPL: 11 (ref 7–25)
CALCIUM SPEC-SCNC: 8.8 MG/DL (ref 8.6–10.5)
CHLORIDE SERPL-SCNC: 104 MMOL/L (ref 98–107)
CO2 SERPL-SCNC: 26.5 MMOL/L (ref 22–29)
CREAT SERPL-MCNC: 1.09 MG/DL (ref 0.76–1.27)
DEPRECATED RDW RBC AUTO: 48 FL (ref 37–54)
EGFRCR SERPLBLD CKD-EPI 2021: 66.9 ML/MIN/1.73
ERYTHROCYTE [DISTWIDTH] IN BLOOD BY AUTOMATED COUNT: 16.1 % (ref 12.3–15.4)
GLUCOSE BLDC GLUCOMTR-MCNC: 133 MG/DL (ref 70–130)
GLUCOSE BLDC GLUCOMTR-MCNC: 152 MG/DL (ref 70–130)
GLUCOSE BLDC GLUCOMTR-MCNC: 193 MG/DL (ref 70–130)
GLUCOSE BLDC GLUCOMTR-MCNC: 92 MG/DL (ref 70–130)
GLUCOSE SERPL-MCNC: 103 MG/DL (ref 65–99)
HCT VFR BLD AUTO: 31.6 % (ref 37.5–51)
HGB BLD-MCNC: 10 G/DL (ref 13–17.7)
INR PPP: 1.18 (ref 0.9–1.1)
MAGNESIUM SERPL-MCNC: 2.4 MG/DL (ref 1.6–2.4)
MCH RBC QN AUTO: 26 PG (ref 26.6–33)
MCHC RBC AUTO-ENTMCNC: 31.6 G/DL (ref 31.5–35.7)
MCV RBC AUTO: 82.1 FL (ref 79–97)
PHOSPHATE SERPL-MCNC: 3.1 MG/DL (ref 2.5–4.5)
PLATELET # BLD AUTO: 273 10*3/MM3 (ref 140–450)
PMV BLD AUTO: 10.8 FL (ref 6–12)
POTASSIUM SERPL-SCNC: 4 MMOL/L (ref 3.5–5.2)
PROTHROMBIN TIME: 15 SECONDS (ref 11.7–14.2)
RBC # BLD AUTO: 3.85 10*6/MM3 (ref 4.14–5.8)
SODIUM SERPL-SCNC: 137 MMOL/L (ref 136–145)
WBC NRBC COR # BLD: 4.22 10*3/MM3 (ref 3.4–10.8)

## 2022-04-15 PROCEDURE — 84100 ASSAY OF PHOSPHORUS: CPT | Performed by: STUDENT IN AN ORGANIZED HEALTH CARE EDUCATION/TRAINING PROGRAM

## 2022-04-15 PROCEDURE — 85610 PROTHROMBIN TIME: CPT | Performed by: NURSE PRACTITIONER

## 2022-04-15 PROCEDURE — 83735 ASSAY OF MAGNESIUM: CPT | Performed by: STUDENT IN AN ORGANIZED HEALTH CARE EDUCATION/TRAINING PROGRAM

## 2022-04-15 PROCEDURE — 85027 COMPLETE CBC AUTOMATED: CPT | Performed by: STUDENT IN AN ORGANIZED HEALTH CARE EDUCATION/TRAINING PROGRAM

## 2022-04-15 PROCEDURE — 80048 BASIC METABOLIC PNL TOTAL CA: CPT | Performed by: STUDENT IN AN ORGANIZED HEALTH CARE EDUCATION/TRAINING PROGRAM

## 2022-04-15 PROCEDURE — 99232 SBSQ HOSP IP/OBS MODERATE 35: CPT | Performed by: NURSE PRACTITIONER

## 2022-04-15 PROCEDURE — 82962 GLUCOSE BLOOD TEST: CPT

## 2022-04-15 PROCEDURE — 63710000001 INSULIN LISPRO (HUMAN) PER 5 UNITS: Performed by: NURSE PRACTITIONER

## 2022-04-15 PROCEDURE — 25010000002 ENOXAPARIN PER 10 MG: Performed by: HOSPITALIST

## 2022-04-15 RX ORDER — WARFARIN SODIUM 7.5 MG/1
7.5 TABLET ORAL
Status: DISCONTINUED | OUTPATIENT
Start: 2022-04-15 | End: 2022-04-20 | Stop reason: HOSPADM

## 2022-04-15 RX ORDER — WARFARIN SODIUM 5 MG/1
5 TABLET ORAL
Status: DISCONTINUED | OUTPATIENT
Start: 2022-04-16 | End: 2022-04-15

## 2022-04-15 RX ORDER — WARFARIN SODIUM 7.5 MG/1
7.5 TABLET ORAL
Status: DISCONTINUED | OUTPATIENT
Start: 2022-04-15 | End: 2022-04-15

## 2022-04-15 RX ADMIN — FAMOTIDINE 20 MG: 20 TABLET ORAL at 17:30

## 2022-04-15 RX ADMIN — DIGOXIN 62.5 MCG: 125 TABLET ORAL at 12:34

## 2022-04-15 RX ADMIN — Medication 10 ML: at 21:02

## 2022-04-15 RX ADMIN — METOPROLOL SUCCINATE 12.5 MG: 25 TABLET, EXTENDED RELEASE ORAL at 10:26

## 2022-04-15 RX ADMIN — MAGNESIUM OXIDE 400 MG (241.3 MG MAGNESIUM) TABLET 400 MG: TABLET at 10:26

## 2022-04-15 RX ADMIN — ATORVASTATIN CALCIUM 40 MG: 20 TABLET, FILM COATED ORAL at 10:26

## 2022-04-15 RX ADMIN — FAMOTIDINE 20 MG: 20 TABLET ORAL at 06:33

## 2022-04-15 RX ADMIN — Medication 1 CAPSULE: at 10:26

## 2022-04-15 RX ADMIN — ENOXAPARIN SODIUM 70 MG: 100 INJECTION SUBCUTANEOUS at 10:27

## 2022-04-15 RX ADMIN — WARFARIN 7.5 MG: 7.5 TABLET ORAL at 21:00

## 2022-04-15 RX ADMIN — Medication 10 ML: at 10:27

## 2022-04-15 RX ADMIN — ENOXAPARIN SODIUM 70 MG: 100 INJECTION SUBCUTANEOUS at 20:59

## 2022-04-15 RX ADMIN — INSULIN LISPRO 2 UNITS: 100 INJECTION, SOLUTION INTRAVENOUS; SUBCUTANEOUS at 12:35

## 2022-04-15 RX ADMIN — MAGNESIUM OXIDE 400 MG (241.3 MG MAGNESIUM) TABLET 400 MG: TABLET at 20:59

## 2022-04-15 NOTE — PROGRESS NOTES
"DAILY PROGRESS NOTE  [facility]    Patient Identification:  Name: Francisco Sequeira  Age: 86 y.o.  Sex: male  :  1935  MRN: [id number]         Primary Care Physician: Rubin Hinkle APRN    Subjective:  Interval History:His right leg is weak.    Objective:    Scheduled Meds:atorvastatin, 40 mg, Oral, Daily  digoxin, 62.5 mcg, Oral, Daily  enoxaparin, 1 mg/kg, Subcutaneous, BID  famotidine, 20 mg, Oral, BID AC  ferrous sulfate, 325 mg, Oral, Every Other Day  insulin glargine, 15 Units, Subcutaneous, Nightly  insulin lispro, 0-9 Units, Subcutaneous, TID AC  lactobacillus acidophilus, 1 capsule, Oral, Daily  magnesium oxide, 400 mg, Oral, BID  metoprolol succinate XL, 12.5 mg, Oral, Daily  sodium chloride, 10 mL, Intravenous, Q12H      Continuous Infusions:Pharmacy to dose warfarin,         Vital signs in last 24 hours:  Temp:  [97.9 °F (36.6 °C)-98.4 °F (36.9 °C)] 98.4 °F (36.9 °C)  Heart Rate:  [79-85] 81  Resp:  [16-18] 18  BP: (115-118)/(55-65) 117/58    Intake/Output:    Intake/Output Summary (Last 24 hours) at 4/15/2022 1548  Last data filed at 2022 1700  Gross per 24 hour   Intake 120 ml   Output --   Net 120 ml       Exam:  /58 (BP Location: Right arm, Patient Position: Lying)   Pulse 81   Temp 98.4 °F (36.9 °C) (Oral)   Resp 18   Ht 182.9 cm (72\")   Wt 68 kg (150 lb)   SpO2 100%   BMI 20.34 kg/m²     General Appearance:    Alert, cooperative, no distress   Head:    Normocephalic, without obvious abnormality, atraumatic   Eyes:       Throat:   Lips, tongue, gums normal   Neck:   Supple, symmetrical, trachea midline, no JVD   Lungs:     Clear to auscultation bilaterally, respirations unlabored   Chest Wall:    No tenderness or deformity    Heart:    Regular rate and rhythm, S1 and S2 normal, no murmur,no  Rub or gallop   Abdomen:     Soft, nontender, bowel sounds active, no masses, no organomegaly    Extremities:   Extremities normal, atraumatic, no cyanosis or edema "   Pulses:      Skin:   Skin is warm and dry,  no rashes or palpable lesions   Neurologic:   Right leg weakness      Lab Results (last 72 hours)     Procedure Component Value Units Date/Time    Digoxin Level [[id number]]  (Abnormal) Collected: 04/13/22 0652    Specimen: Blood Updated: 04/13/22 1121     Digoxin 0.50 ng/mL     POC Glucose Once [[id number]]  (Abnormal) Collected: 04/13/22 1116    Specimen: Blood Updated: 04/13/22 1119     Glucose 144 mg/dL      Comment: Meter: [id number] : [id number] Alyssa Ramses ALEJO       Basic Metabolic Panel [[id number]]  (Abnormal) Collected: 04/13/22 0652    Specimen: Blood Updated: 04/13/22 0750     Glucose 105 mg/dL      BUN 14 mg/dL      Creatinine 0.91 mg/dL      Sodium 136 mmol/L      Potassium 4.4 mmol/L      Chloride 105 mmol/L      CO2 24.0 mmol/L      Calcium 8.3 mg/dL      BUN/Creatinine Ratio 15.4     Anion Gap 7.0 mmol/L      eGFR 83.1 mL/min/1.73      Comment: National Kidney Foundation and American Society of Nephrology (ASN) Task Force recommended calculation based on the Chronic Kidney Disease Epidemiology Collaboration (CKD-EPI) equation refit without adjustment for race.       Narrative:      GFR Normal >60  Chronic Kidney Disease <60  Kidney Failure <15      Phosphorus [[id number]]  (Normal) Collected: 04/13/22 0652    Specimen: Blood Updated: 04/13/22 0750     Phosphorus 3.1 mg/dL     Magnesium [[id number]]  (Normal) Collected: 04/13/22 0652    Specimen: Blood Updated: 04/13/22 0750     Magnesium 2.3 mg/dL     Body Fluid Culture - Aspirate, Psoas [[id number]] Collected: 04/11/22 1305    Specimen: Aspirate from Psoas Updated: 04/13/22 0734     Body Fluid Culture No growth at 2 days     Gram Stain Few (2+) WBCs per low power field      No organisms seen    CBC (No Diff) [[id number]]  (Abnormal) Collected: 04/13/22 0652    Specimen: Blood Updated: 04/13/22 0716     WBC 4.12 10*3/mm3      RBC 3.51 10*6/mm3      Hemoglobin 9.3 g/dL      Hematocrit 29.8 %      MCV  84.9 fL      MCH 26.5 pg      MCHC 31.2 g/dL      RDW 16.3 %      RDW-SD 50.6 fl      MPV 10.7 fL      Platelets 259 10*3/mm3     POC Glucose Once [[id number]]  (Normal) Collected: 04/13/22 0639    Specimen: Blood Updated: 04/13/22 0640     Glucose 101 mg/dL      Comment: Meter: [id number] : [id number] Gamboa Damon NA       POC Glucose Once [[id number]]  (Normal) Collected: 04/13/22 0236    Specimen: Blood Updated: 04/13/22 0237     Glucose 104 mg/dL      Comment: Meter: [id number] : [id number] Jason Sweeney RN       POC Glucose Once [[id number]]  (Abnormal) Collected: 04/12/22 1950    Specimen: Blood Updated: 04/12/22 1951     Glucose 143 mg/dL      Comment: Meter: [id number] : [id number] Gamboa Damon NA       POC Glucose Once [[id number]]  (Normal) Collected: 04/12/22 1610    Specimen: Blood Updated: 04/12/22 1612     Glucose 108 mg/dL      Comment: Meter: [id number] : [id number] Abhinav ALEJO       Basic Metabolic Panel [[id number]]  (Abnormal) Collected: 04/12/22 1157    Specimen: Blood Updated: 04/12/22 1233     Glucose 159 mg/dL      BUN 16 mg/dL      Creatinine 1.19 mg/dL      Sodium 134 mmol/L      Potassium 4.6 mmol/L      Chloride 102 mmol/L      CO2 24.7 mmol/L      Calcium 8.3 mg/dL      BUN/Creatinine Ratio 13.4     Anion Gap 7.3 mmol/L      eGFR 60.2 mL/min/1.73      Comment: National Kidney Foundation and American Society of Nephrology (ASN) Task Force recommended calculation based on the Chronic Kidney Disease Epidemiology Collaboration (CKD-EPI) equation refit without adjustment for race.       Narrative:      GFR Normal >60  Chronic Kidney Disease <60  Kidney Failure <15      Phosphorus [[id number]]  (Normal) Collected: 04/12/22 1157    Specimen: Blood Updated: 04/12/22 1233     Phosphorus 3.1 mg/dL     Magnesium [[id number]]  (Normal) Collected: 04/12/22 1157    Specimen: Blood Updated: 04/12/22 1233     Magnesium 2.2 mg/dL     CBC (No Diff) [[id number]]  (Abnormal) Collected: 04/12/22  1157    Specimen: Blood Updated: 04/12/22 1211     WBC 5.56 10*3/mm3      RBC 3.33 10*6/mm3      Hemoglobin 8.7 g/dL      Hematocrit 28.5 %      MCV 85.6 fL      MCH 26.1 pg      MCHC 30.5 g/dL      RDW 16.1 %      RDW-SD 49.5 fl      MPV 10.9 fL      Platelets 273 10*3/mm3     POC Glucose Once [[id number]]  (Abnormal) Collected: 04/12/22 1106    Specimen: Blood Updated: 04/12/22 1108     Glucose 154 mg/dL      Comment: Meter: [id number] : [id number] Abhinav Lomeli NA       POC Glucose Once [[id number]]  (Normal) Collected: 04/12/22 0621    Specimen: Blood Updated: 04/12/22 0622     Glucose 92 mg/dL      Comment: Meter: [id number] : [id number] Markos Nataia NA       POC Glucose Once [[id number]]  (Abnormal) Collected: 04/12/22 0602    Specimen: Blood Updated: 04/12/22 0604     Glucose 37 mg/dL      Comment: RN Notified R and V Meter: [id number] : [id number] Markos Nataia NA       POC Glucose Once [[id number]]  (Abnormal) Collected: 04/12/22 0601    Specimen: Blood Updated: 04/12/22 0602     Glucose 33 mg/dL      Comment: Repeat Test Meter: [id number] : [id number] Markos Nataia NA       POC Glucose Once [[id number]]  (Normal) Collected: 04/11/22 1936    Specimen: Blood Updated: 04/11/22 1938     Glucose 125 mg/dL      Comment: Meter: [id number] : [id number] Markos Nataia NA       POC Glucose Once [[id number]]  (Abnormal) Collected: 04/11/22 1610    Specimen: Blood Updated: 04/11/22 1611     Glucose 172 mg/dL      Comment: RN Notified R and V Meter: [id number] : [id number] Sen ALEJO       Basic Metabolic Panel [[id number]]  (Abnormal) Collected: 04/11/22 1123    Specimen: Blood Updated: 04/11/22 1246     Glucose 103 mg/dL      BUN 13 mg/dL      Creatinine 1.15 mg/dL      Sodium 136 mmol/L      Potassium 4.5 mmol/L      Chloride 103 mmol/L      CO2 26.0 mmol/L      Calcium 8.0 mg/dL      BUN/Creatinine Ratio 11.3     Anion Gap 7.0 mmol/L      eGFR 62.8 mL/min/1.73      Comment: National Kidney  Foundation and American Society of Nephrology (ASN) Task Force recommended calculation based on the Chronic Kidney Disease Epidemiology Collaboration (CKD-EPI) equation refit without adjustment for race.       Narrative:      GFR Normal >60  Chronic Kidney Disease <60  Kidney Failure <15      Magnesium [[id number]]  (Normal) Collected: 04/11/22 1123    Specimen: Blood Updated: 04/11/22 1246     Magnesium 2.1 mg/dL     Phosphorus [[id number]]  (Normal) Collected: 04/11/22 1123    Specimen: Blood Updated: 04/11/22 1246     Phosphorus 3.3 mg/dL     CBC (No Diff) [[id number]]  (Abnormal) Collected: 04/11/22 1123    Specimen: Blood Updated: 04/11/22 1230     WBC 5.50 10*3/mm3      RBC 3.28 10*6/mm3      Hemoglobin 8.8 g/dL      Hematocrit 28.2 %      MCV 86.0 fL      MCH 26.8 pg      MCHC 31.2 g/dL      RDW 15.9 %      RDW-SD 49.4 fl      MPV 11.3 fL      Platelets 288 10*3/mm3     POC Glucose Once [[id number]]  (Normal) Collected: 04/11/22 1103    Specimen: Blood Updated: 04/11/22 1105     Glucose 93 mg/dL      Comment: Meter: [id number] : [id number] Sen Trent NA       POC Glucose Once [[id number]]  (Normal) Collected: 04/11/22 0537    Specimen: Blood Updated: 04/11/22 0538     Glucose 98 mg/dL      Comment: Meter: [id number] : [id number] Ceasar Gladys NA       POC Glucose Once [[id number]]  (Normal) Collected: 04/11/22 0241    Specimen: Blood Updated: 04/11/22 0242     Glucose 97 mg/dL      Comment: Meter: [id number] : [id number] Ceasar Gladys NA       POC Glucose Once [[id number]]  (Normal) Collected: 04/10/22 1949    Specimen: Blood Updated: 04/10/22 1950     Glucose 113 mg/dL      Comment: Meter: [id number] : [id number] Jason Sweeney RN       POC Glucose Once [[id number]]  (Abnormal) Collected: 04/10/22 1602    Specimen: Blood Updated: 04/10/22 1603     Glucose 159 mg/dL      Comment: Meter: [id number] : [id number] Leon Reyes Clausia NA           Data Review:  Results from last 7 days   Lab Units  04/15/22  0806 04/14/22  0848 04/13/22  0652   SODIUM mmol/L 137 134* 136   POTASSIUM mmol/L 4.0 4.1 4.4   CHLORIDE mmol/L 104 103 105   CO2 mmol/L 26.5 24.0 24.0   BUN mg/dL 12 14 14   CREATININE mg/dL 1.09 1.01 0.91   GLUCOSE mg/dL 103* 159* 105*   CALCIUM mg/dL 8.8 8.2* 8.3*     Results from last 7 days   Lab Units 04/15/22  0806 04/14/22  0848 04/13/22  0652   WBC 10*3/mm3 4.22 5.01 4.12   HEMOGLOBIN g/dL 10.0* 9.3* 9.3*   HEMATOCRIT % 31.6* 30.5* 29.8*   PLATELETS 10*3/mm3 273 271 259             Lab Results   Lab Value Date/Time    TROPONINT 0.030 04/06/2022 0002    TROPONINT 0.018 03/30/2022 1625    TROPONINT 0.027 02/18/2022 2305    TROPONINT 0.011 10/23/2021 1617    TROPONINT 0.017 07/15/2021 2029    TROPONINT <0.010 01/13/2020 0747    TROPONINT <0.010 03/30/2017 0809               Invalid input(s): PROT, LABALBU          Glucose   Date/Time Value Ref Range Status   04/15/2022 1123 193 (H) 70 - 130 mg/dL Final     Comment:     Meter: [id number] : [id number] Debbi Nando NA   04/15/2022 0615 92 70 - 130 mg/dL Final     Comment:     Meter: [id number] : [id number] Edgardosoraida Min NA   04/14/2022 2002 125 70 - 130 mg/dL Final     Comment:     Meter: [id number] : [id number] Jordan RN   04/14/2022 1550 94 70 - 130 mg/dL Final     Comment:     Meter: [id number] : [id number] Alyssa Jarrettisa NA   04/14/2022 1114 181 (H) 70 - 130 mg/dL Final     Comment:     Meter: [id number] : [id number] Alyssa Jarrettisa NA   04/14/2022 0542 81 70 - 130 mg/dL Final     Comment:     Meter: [id number] : [id number] Ceasar ALEJO   04/13/2022 2022 190 (H) 70 - 130 mg/dL Final     Comment:     Meter: [id number] : [id number] Ceasar ALEJO   04/13/2022 1550 164 (H) 70 - 130 mg/dL Final     Comment:     Meter: [id number] : [id number] Alyssa ALEJO           Past Medical History:   Diagnosis Date   • Allergic rhinitis    • Aortic valve insufficiency    • Ascending aortic aneurysm (HCC)     • Atrial fibrillation (HCC)    • Bacteremia    • Calcific aortic stenosis of bicuspid valve    • Cardiac arrest (HCC)    • Cardiomyopathy (HCC)    • CKD (chronic kidney disease) stage 3, GFR 30-59 ml/min (HCC)    • Contact dermatitis due to poison ivy    • Elbow fracture    • Esophageal reflux    • GERD (gastroesophageal reflux disease)    • Head injury    • History of transfusion    • Hyperglycemia    • Hyperlipidemia    • Hypertension    • Kidney carcinoma (HCC)    • Nonischemic cardiomyopathy (HCC)    • Renal oncocytoma    • Seasonal allergic reaction    • Sinusitis    • Syncope    • Type 2 diabetes mellitus (HCC)     uncontrolled   • Visual field defect        Assessment:  Active Hospital Problems    Diagnosis  POA   • **Hematoma of iliopsoas muscle, left, initial encounter [S70.12XA]  Yes   • History of CVA (cerebrovascular accident) [Z86.73]  Not Applicable   • S/P laparoscopic cholecystectomy [Z90.49]  Not Applicable   • Anemia [D64.9]  Yes   • Chronic anticoagulation [Z79.01]  Not Applicable   • Stage 3b chronic kidney disease (HCC) [N18.32]  Yes   • H/O heart valve replacement with mechanical valve [Z95.2]  Not Applicable   • Type 2 diabetes mellitus (HCC) [E11.9]  Yes   • Hypertension [I10]  Yes   • Atrial fibrillation (HCC) [I48.91]  Yes      Resolved Hospital Problems   No resolved problems to display.       Plan:  Continue with current RX as per cardiology. Restart coumadin soon. Will need SNU for rehab. Follow lab.  DC planning. Probably needs a few more days    Leobardo Juarez MD  4/15/2022  15:48 EDT

## 2022-04-15 NOTE — PROGRESS NOTES
Discharge Planning Assessment  [facility]     Patient Name: Francisco Sequeira  MRN: [id number]  Today's Date: 4/15/2022    Admit Date: 4/5/2022     Discharge Needs Assessment    No documentation.                Discharge Plan     Row Name 04/15/22 1621       Plan    Plan New referral placed to [facility] per Spouse request    Plan Comments New referral to [facility] per Spouse request, referral placed in Epic and call place to [facility] and spoke with Vivian Barfield. Spoke with spouse at bedside and informed her that [facility] has accepted the patient, spouse does not want [facility] she would like her first choice [facility] or [facility] called and spoke with Amirah she stated both [facility] have no beds available. Paient will need an [place] precert. Jayla BURKS              Continued Care and Services - Admitted Since 4/5/2022     Destination     Service Provider Request Status Selected Services Address Phone Fax Patient Preferred    [facility]  Pending - Request Sent N/A [address] [facility] [address] [phone] --    [facility]  Pending - Request Sent N/A [address] [facility] [id number] [phone] --    [facility]  Pending - Request Sent N/A [address] [facility] [address] [phone] --    [facility]  Pending - Request Sent N/A [address] [facility] [address] [phone] --    [facility]  Pending - Request Sent N/A [address] [facility] [id number] [phone] --    [facility]  Pending - Request Sent N/A [address] [facility] [id number] [phone] --    [facility]  Pending - Request Sent N/A [address] [facility] [address] [phone] --    [facility]  [place]  Pending - Request Sent N/A [address] [facility] [address] [phone] --    [facility]  Pending - Request Sent N/A [address] [facility] [address] [phone] --    [facility]  Declined  Bed not available N/A [address] [facility] [address] [phone] --    [facility] [place]  Declined  Bed not available N/A [address] [facility] [address] [phone] --          Home Medical Care     Service Provider Request Status Selected Services Address Phone Fax Patient Preferred     [facility]  Accepted N/A [address] [place] [id number] [phone] --            Selected Continued Care - Prior Encounters Includes selections from prior encounters from 1/5/2022 to 4/15/2022    Discharged on 3/2/2022 Admission date: 2/14/2022 - Discharge disposition: Home-Health Care Svc    Home Medical Care     Service Provider Selected Services Address Phone Fax Patient Preferred     [facility]  Home Health Services [address] [place] [id number] [phone] --                    Expected Discharge Date and Time     Expected Discharge Date Expected Discharge Time    Apr 15, 2022          Demographic Summary    No documentation.                Functional Status    No documentation.                Psychosocial    No documentation.                Abuse/Neglect    No documentation.                Legal    No documentation.                Substance Abuse    No documentation.                Patient Forms    No documentation.                   Bertha Kelley, RN

## 2022-04-15 NOTE — PLAN OF CARE
Goal Outcome Evaluation:  Plan of Care Reviewed With: patient        Progress: no change  Outcome Evaluation: Patient remains on room air with SATs in high 90s, chronic afib on monitor. Abrasion noted on back of neck between left shoulder blade and spine. Patient has been seeing a dermatologist for the wound, wife is treating it with home meds. Applied adhesive bandage. Will continue to monitor.

## 2022-04-15 NOTE — PROGRESS NOTES
[facility] Clinical Pharmacy Services: Warfarin Dosing/Monitoring Consult    Francisco Sequeira is a 86 y.o. male, estimated creatinine clearance is 48.5 mL/min (by C-G formula based on SCr of 1.09 mg/dL). weighing 68 kg (150 lb).    Results from last 7 days   Lab Units 04/15/22  1723 04/15/22  0806 04/14/22  0848 04/13/22  0652 04/12/22  1157 04/11/22  1123   INR  1.18*  --   --   --   --   --    HEMOGLOBIN g/dL  --  10.0* 9.3* 9.3* 8.7* 8.8*   HEMATOCRIT %  --  31.6* 30.5* 29.8* 28.5* 28.2*   PLATELETS 10*3/mm3  --  273 271 259 273 288     Prior to admission anticoagulation: 7.5 mg PO daily on Mon, Wed, Fri; 5 mg PO daily on all other days of the week.    Hospital Anticoagulation:  Consulting provider: Iza Mckeon APRN  Start date: 4/15/22  Indication: Mechanical valve  Target INR: 2.5 - 3.5    Bridge Therapy: Yes  with enoxaparin    Potential food or drug interactions: none at this time    Education complete?/Date: No; plan for follow up TBD    Assessment/Plan:  Dose: will start with 7.5 mg daily until INR is therapeutic  Monitor for any signs or symptoms of bleeding  Follow up daily INRs and dose adjustments    Pharmacy will continue to follow until discharge or discontinuation of warfarin.     GRECIA HALL [facility]  Clinical Pharmacist

## 2022-04-15 NOTE — PROGRESS NOTES
"    Patient Name: Francisco Sequeira  :1935  86 y.o.      Patient Care Team:  Rubin Hinkle APRN as PCP - General (Family Medicine)  Audra Vazquez RPH as Pharmacist  Vasquez Niño PharmD as Pharmacist (Pharmacy)  Tatiana Tinoco MD as Consulting Physician (Gastroenterology)    Chief Complaint: follow up persistent atrial fibrillation, mechanical aortic valve    Interval History: overnight oximetry done and only had 3 desaturations about 30 seconds each. A few asymptomatic pauses noted. Remains in AF. Feels well. Wife is looking for rehab.       Objective   Vital Signs  Temp:  [97.9 °F (36.6 °C)-98.2 °F (36.8 °C)] 97.9 °F (36.6 °C)  Heart Rate:  [79-85] 79  Resp:  [16-18] 18  BP: (115-118)/(55-65) 118/65    Intake/Output Summary (Last 24 hours) at 4/15/2022 1347  Last data filed at 2022 1700  Gross per 24 hour   Intake 120 ml   Output --   Net 120 ml     Flowsheet Rows    Flowsheet Row First Filed Value   Admission Height 182.9 cm (72\") Documented at 2022 0047   Admission Weight 70.8 kg (156 lb) Documented at 2022 0047          Physical Exam:   General Appearance:    Alert, cooperative, in no acute distress   Lungs:     Clear to auscultation.  Normal respiratory effort and rate.      Heart:    Regular rhythm and normal rate, normal S1 and S2, no murmurs, gallops or rubs.     Chest Wall:    No abnormalities observed   Abdomen:     Soft, nontender, positive bowel sounds.     Extremities:   no cyanosis, clubbing or edema.  No marked joint deformities.  Adequate musculoskeletal strength.       Results Review:    Results from last 7 days   Lab Units 04/15/22  0806   SODIUM mmol/L 137   POTASSIUM mmol/L 4.0   CHLORIDE mmol/L 104   CO2 mmol/L 26.5   BUN mg/dL 12   CREATININE mg/dL 1.09   GLUCOSE mg/dL 103*   CALCIUM mg/dL 8.8         Results from last 7 days   Lab Units 04/15/22  0806   WBC 10*3/mm3 4.22   HEMOGLOBIN g/dL 10.0*   HEMATOCRIT % 31.6*   PLATELETS 10*3/mm3 273         Results from last " 7 days   Lab Units 04/15/22  0806   MAGNESIUM mg/dL 2.4                   Medication Review:   atorvastatin, 40 mg, Oral, Daily  digoxin, 62.5 mcg, Oral, Daily  enoxaparin, 1 mg/kg, Subcutaneous, BID  famotidine, 20 mg, Oral, BID AC  ferrous sulfate, 325 mg, Oral, Every Other Day  insulin glargine, 15 Units, Subcutaneous, Nightly  insulin lispro, 0-9 Units, Subcutaneous, TID AC  lactobacillus acidophilus, 1 capsule, Oral, Daily  magnesium oxide, 400 mg, Oral, BID  metoprolol succinate XL, 12.5 mg, Oral, Daily  sodium chloride, 10 mL, Intravenous, Q12H              Assessment/Plan   1.  Spontaneous left iliopsoas hematoma secondary to anticoagulation  2.  Mechanical aortic valve replacement  3.  Prior CVA with residual left-sided weakness (INR at 2.2)  4.  Chronic anticoagulation with goal INR of 3 - 3.5  5.  History of lower GI bleed with hematochezia in November 2021  6.  Right rectus sheath hematoma in November 2021  7.  Persistent atrial fibrillation  8.  History of renal cell carcinoma and right nephrectomy   9.  Chronic kidney disease  10.  Recurrent right pleural effusion, status post multiple thoracenteses  11.  Multifactorial anemia  12.  Diabetes  13.  Deconditioning and weakness  14.  Multiple pauses noted on telemetry of up to 5.8 seconds    No indication for pace maker.  Continue digoxin and metoprolol.  If ok with all, would resume warfarin today.   Needs rehab.  Will see again Monday.      LIZA Krishnamurthy  [facility]  04/15/22  13:47 EDT

## 2022-04-16 LAB
ANION GAP SERPL CALCULATED.3IONS-SCNC: 10 MMOL/L (ref 5–15)
BUN SERPL-MCNC: 14 MG/DL (ref 8–23)
BUN/CREAT SERPL: 11.9 (ref 7–25)
CALCIUM SPEC-SCNC: 8.3 MG/DL (ref 8.6–10.5)
CHLORIDE SERPL-SCNC: 104 MMOL/L (ref 98–107)
CO2 SERPL-SCNC: 21 MMOL/L (ref 22–29)
CREAT SERPL-MCNC: 1.18 MG/DL (ref 0.76–1.27)
DEPRECATED RDW RBC AUTO: 50.9 FL (ref 37–54)
EGFRCR SERPLBLD CKD-EPI 2021: 60.8 ML/MIN/1.73
ERYTHROCYTE [DISTWIDTH] IN BLOOD BY AUTOMATED COUNT: 16.6 % (ref 12.3–15.4)
GLUCOSE BLDC GLUCOMTR-MCNC: 108 MG/DL (ref 70–130)
GLUCOSE BLDC GLUCOMTR-MCNC: 148 MG/DL (ref 70–130)
GLUCOSE BLDC GLUCOMTR-MCNC: 150 MG/DL (ref 70–130)
GLUCOSE BLDC GLUCOMTR-MCNC: 177 MG/DL (ref 70–130)
GLUCOSE SERPL-MCNC: 175 MG/DL (ref 65–99)
HCT VFR BLD AUTO: 32.4 % (ref 37.5–51)
HGB BLD-MCNC: 10 G/DL (ref 13–17.7)
INR PPP: 1.2 (ref 0.9–1.1)
MAGNESIUM SERPL-MCNC: 2.2 MG/DL (ref 1.6–2.4)
MCH RBC QN AUTO: 26 PG (ref 26.6–33)
MCHC RBC AUTO-ENTMCNC: 30.9 G/DL (ref 31.5–35.7)
MCV RBC AUTO: 84.2 FL (ref 79–97)
PHOSPHATE SERPL-MCNC: 2.7 MG/DL (ref 2.5–4.5)
PLATELET # BLD AUTO: 259 10*3/MM3 (ref 140–450)
PMV BLD AUTO: 10.6 FL (ref 6–12)
POTASSIUM SERPL-SCNC: 4.5 MMOL/L (ref 3.5–5.2)
PROTHROMBIN TIME: 15.1 SECONDS (ref 11.7–14.2)
RBC # BLD AUTO: 3.85 10*6/MM3 (ref 4.14–5.8)
SODIUM SERPL-SCNC: 135 MMOL/L (ref 136–145)
WBC NRBC COR # BLD: 7.19 10*3/MM3 (ref 3.4–10.8)

## 2022-04-16 PROCEDURE — 25010000002 ONDANSETRON PER 1 MG: Performed by: NURSE PRACTITIONER

## 2022-04-16 PROCEDURE — 85027 COMPLETE CBC AUTOMATED: CPT | Performed by: STUDENT IN AN ORGANIZED HEALTH CARE EDUCATION/TRAINING PROGRAM

## 2022-04-16 PROCEDURE — 85610 PROTHROMBIN TIME: CPT | Performed by: NURSE PRACTITIONER

## 2022-04-16 PROCEDURE — 25010000002 ENOXAPARIN PER 10 MG: Performed by: HOSPITALIST

## 2022-04-16 PROCEDURE — 80048 BASIC METABOLIC PNL TOTAL CA: CPT | Performed by: STUDENT IN AN ORGANIZED HEALTH CARE EDUCATION/TRAINING PROGRAM

## 2022-04-16 PROCEDURE — 84100 ASSAY OF PHOSPHORUS: CPT | Performed by: STUDENT IN AN ORGANIZED HEALTH CARE EDUCATION/TRAINING PROGRAM

## 2022-04-16 PROCEDURE — 36415 COLL VENOUS BLD VENIPUNCTURE: CPT | Performed by: STUDENT IN AN ORGANIZED HEALTH CARE EDUCATION/TRAINING PROGRAM

## 2022-04-16 PROCEDURE — 83735 ASSAY OF MAGNESIUM: CPT | Performed by: STUDENT IN AN ORGANIZED HEALTH CARE EDUCATION/TRAINING PROGRAM

## 2022-04-16 PROCEDURE — 82962 GLUCOSE BLOOD TEST: CPT

## 2022-04-16 RX ADMIN — ENOXAPARIN SODIUM 70 MG: 100 INJECTION SUBCUTANEOUS at 09:50

## 2022-04-16 RX ADMIN — WARFARIN 7.5 MG: 7.5 TABLET ORAL at 17:32

## 2022-04-16 RX ADMIN — ATORVASTATIN CALCIUM 40 MG: 20 TABLET, FILM COATED ORAL at 09:50

## 2022-04-16 RX ADMIN — ENOXAPARIN SODIUM 70 MG: 100 INJECTION SUBCUTANEOUS at 20:30

## 2022-04-16 RX ADMIN — FAMOTIDINE 20 MG: 20 TABLET ORAL at 09:51

## 2022-04-16 RX ADMIN — Medication 10 ML: at 20:31

## 2022-04-16 RX ADMIN — FAMOTIDINE 20 MG: 20 TABLET ORAL at 17:32

## 2022-04-16 RX ADMIN — FERROUS SULFATE TAB 325 MG (65 MG ELEMENTAL FE) 325 MG: 325 (65 FE) TAB at 09:51

## 2022-04-16 RX ADMIN — ONDANSETRON 4 MG: 2 INJECTION INTRAMUSCULAR; INTRAVENOUS at 12:05

## 2022-04-16 RX ADMIN — MAGNESIUM OXIDE 400 MG (241.3 MG MAGNESIUM) TABLET 400 MG: TABLET at 20:30

## 2022-04-16 RX ADMIN — MAGNESIUM OXIDE 400 MG (241.3 MG MAGNESIUM) TABLET 400 MG: TABLET at 09:51

## 2022-04-16 RX ADMIN — Medication 1 CAPSULE: at 09:51

## 2022-04-16 RX ADMIN — DIGOXIN 62.5 MCG: 125 TABLET ORAL at 12:05

## 2022-04-16 RX ADMIN — Medication 10 ML: at 09:51

## 2022-04-16 RX ADMIN — METOPROLOL SUCCINATE 12.5 MG: 25 TABLET, EXTENDED RELEASE ORAL at 09:51

## 2022-04-16 NOTE — PROGRESS NOTES
"DAILY PROGRESS NOTE  [facility]    Patient Identification:  Name: Francisco Sequeira  Age: 86 y.o.  Sex: male  :  1935  MRN: [id number]         Primary Care Physician: Rubin Hinkle APRN    Subjective:  Interval History:His right leg is weak.    Objective:    Scheduled Meds:atorvastatin, 40 mg, Oral, Daily  digoxin, 62.5 mcg, Oral, Daily  enoxaparin, 1 mg/kg, Subcutaneous, BID  famotidine, 20 mg, Oral, BID AC  ferrous sulfate, 325 mg, Oral, Every Other Day  insulin glargine, 15 Units, Subcutaneous, Nightly  insulin lispro, 0-9 Units, Subcutaneous, TID AC  lactobacillus acidophilus, 1 capsule, Oral, Daily  magnesium oxide, 400 mg, Oral, BID  metoprolol succinate XL, 12.5 mg, Oral, Daily  sodium chloride, 10 mL, Intravenous, Q12H  warfarin, 7.5 mg, Oral, Daily      Continuous Infusions:Pharmacy to dose warfarin,         Vital signs in last 24 hours:  Temp:  [97.2 °F (36.2 °C)-98.1 °F (36.7 °C)] 97.2 °F (36.2 °C)  Heart Rate:  [] 100  Resp:  [18-20] 20  BP: (102-124)/(69-72) 119/71    Intake/Output:  No intake or output data in the 24 hours ending 22 1515    Exam:  /71 (BP Location: Right arm, Patient Position: Lying)   Pulse 100   Temp 97.2 °F (36.2 °C) (Oral)   Resp 20   Ht 182.9 cm (72\")   Wt 68 kg (150 lb)   SpO2 95%   BMI 20.34 kg/m²     General Appearance:    Alert, cooperative, no distress   Head:    Normocephalic, without obvious abnormality, atraumatic   Eyes:       Throat:   Lips, tongue, gums normal   Neck:   Supple, symmetrical, trachea midline, no JVD   Lungs:     Clear to auscultation bilaterally, respirations unlabored   Chest Wall:    No tenderness or deformity    Heart:    Regular rate and rhythm, S1 and S2 normal, no murmur,no  Rub or gallop   Abdomen:     Soft, nontender, bowel sounds active, no masses, no organomegaly    Extremities:   Extremities normal, atraumatic, no cyanosis or edema   Pulses:      Skin:   Skin is warm and dry,  no rashes or " palpable lesions   Neurologic:   Right leg weakness      Lab Results (last 72 hours)     Procedure Component Value Units Date/Time    Digoxin Level [[id number]]  (Abnormal) Collected: 04/13/22 0652    Specimen: Blood Updated: 04/13/22 1121     Digoxin 0.50 ng/mL     POC Glucose Once [[id number]]  (Abnormal) Collected: 04/13/22 1116    Specimen: Blood Updated: 04/13/22 1119     Glucose 144 mg/dL      Comment: Meter: [id number] : [id number] Alyssa Pearce Renny MELO       Basic Metabolic Panel [[id number]]  (Abnormal) Collected: 04/13/22 0652    Specimen: Blood Updated: 04/13/22 0750     Glucose 105 mg/dL      BUN 14 mg/dL      Creatinine 0.91 mg/dL      Sodium 136 mmol/L      Potassium 4.4 mmol/L      Chloride 105 mmol/L      CO2 24.0 mmol/L      Calcium 8.3 mg/dL      BUN/Creatinine Ratio 15.4     Anion Gap 7.0 mmol/L      eGFR 83.1 mL/min/1.73      Comment: National Kidney Foundation and American Society of Nephrology (ASN) Task Force recommended calculation based on the Chronic Kidney Disease Epidemiology Collaboration (CKD-EPI) equation refit without adjustment for race.       Narrative:      GFR Normal >60  Chronic Kidney Disease <60  Kidney Failure <15      Phosphorus [[id number]]  (Normal) Collected: 04/13/22 0652    Specimen: Blood Updated: 04/13/22 0750     Phosphorus 3.1 mg/dL     Magnesium [[id number]]  (Normal) Collected: 04/13/22 0652    Specimen: Blood Updated: 04/13/22 0750     Magnesium 2.3 mg/dL     Body Fluid Culture - Aspirate, Psoas [[id number]] Collected: 04/11/22 1305    Specimen: Aspirate from Psoas Updated: 04/13/22 0734     Body Fluid Culture No growth at 2 days     Gram Stain Few (2+) WBCs per low power field      No organisms seen    CBC (No Diff) [[id number]]  (Abnormal) Collected: 04/13/22 0652    Specimen: Blood Updated: 04/13/22 0716     WBC 4.12 10*3/mm3      RBC 3.51 10*6/mm3      Hemoglobin 9.3 g/dL      Hematocrit 29.8 %      MCV 84.9 fL      MCH 26.5 pg      MCHC 31.2 g/dL      RDW 16.3 %       RDW-SD 50.6 fl      MPV 10.7 fL      Platelets 259 10*3/mm3     POC Glucose Once [[id number]]  (Normal) Collected: 04/13/22 0639    Specimen: Blood Updated: 04/13/22 0640     Glucose 101 mg/dL      Comment: Meter: [id number] : [id number] Gamboa Damon NA       POC Glucose Once [[id number]]  (Normal) Collected: 04/13/22 0236    Specimen: Blood Updated: 04/13/22 0237     Glucose 104 mg/dL      Comment: Meter: [id number] : [id number] Jason Sweeney RN       POC Glucose Once [[id number]]  (Abnormal) Collected: 04/12/22 1950    Specimen: Blood Updated: 04/12/22 1951     Glucose 143 mg/dL      Comment: Meter: [id number] : [id number] Gamboa Damon NA       POC Glucose Once [[id number]]  (Normal) Collected: 04/12/22 1610    Specimen: Blood Updated: 04/12/22 1612     Glucose 108 mg/dL      Comment: Meter: [id number] : [id number] Abhinav ALEJO       Basic Metabolic Panel [[id number]]  (Abnormal) Collected: 04/12/22 1157    Specimen: Blood Updated: 04/12/22 1233     Glucose 159 mg/dL      BUN 16 mg/dL      Creatinine 1.19 mg/dL      Sodium 134 mmol/L      Potassium 4.6 mmol/L      Chloride 102 mmol/L      CO2 24.7 mmol/L      Calcium 8.3 mg/dL      BUN/Creatinine Ratio 13.4     Anion Gap 7.3 mmol/L      eGFR 60.2 mL/min/1.73      Comment: National Kidney Foundation and American Society of Nephrology (ASN) Task Force recommended calculation based on the Chronic Kidney Disease Epidemiology Collaboration (CKD-EPI) equation refit without adjustment for race.       Narrative:      GFR Normal >60  Chronic Kidney Disease <60  Kidney Failure <15      Phosphorus [[id number]]  (Normal) Collected: 04/12/22 1157    Specimen: Blood Updated: 04/12/22 1233     Phosphorus 3.1 mg/dL     Magnesium [[id number]]  (Normal) Collected: 04/12/22 1157    Specimen: Blood Updated: 04/12/22 1233     Magnesium 2.2 mg/dL     CBC (No Diff) [[id number]]  (Abnormal) Collected: 04/12/22 1157    Specimen: Blood Updated: 04/12/22 1211     WBC 5.56  10*3/mm3      RBC 3.33 10*6/mm3      Hemoglobin 8.7 g/dL      Hematocrit 28.5 %      MCV 85.6 fL      MCH 26.1 pg      MCHC 30.5 g/dL      RDW 16.1 %      RDW-SD 49.5 fl      MPV 10.9 fL      Platelets 273 10*3/mm3     POC Glucose Once [[id number]]  (Abnormal) Collected: 04/12/22 1106    Specimen: Blood Updated: 04/12/22 1108     Glucose 154 mg/dL      Comment: Meter: [id number] : [id number] Abhinav ALEJO       POC Glucose Once [[id number]]  (Normal) Collected: 04/12/22 0621    Specimen: Blood Updated: 04/12/22 0622     Glucose 92 mg/dL      Comment: Meter: [id number] : [id number] Markos Membrenoaia NA       POC Glucose Once [[id number]]  (Abnormal) Collected: 04/12/22 0602    Specimen: Blood Updated: 04/12/22 0604     Glucose 37 mg/dL      Comment: RN Notified R and V Meter: [id number] : [id number] Markos Membrenoaia NA       POC Glucose Once [[id number]]  (Abnormal) Collected: 04/12/22 0601    Specimen: Blood Updated: 04/12/22 0602     Glucose 33 mg/dL      Comment: Repeat Test Meter: [id number] : [id number] Markos Membrenoaia NA       POC Glucose Once [[id number]]  (Normal) Collected: 04/11/22 1936    Specimen: Blood Updated: 04/11/22 1938     Glucose 125 mg/dL      Comment: Meter: [id number] : [id number] Markos Nataia NA       POC Glucose Once [[id number]]  (Abnormal) Collected: 04/11/22 1610    Specimen: Blood Updated: 04/11/22 1611     Glucose 172 mg/dL      Comment: RN Notified R and V Meter: [id number] : [id number] Sen ALEJO       Basic Metabolic Panel [[id number]]  (Abnormal) Collected: 04/11/22 1123    Specimen: Blood Updated: 04/11/22 1246     Glucose 103 mg/dL      BUN 13 mg/dL      Creatinine 1.15 mg/dL      Sodium 136 mmol/L      Potassium 4.5 mmol/L      Chloride 103 mmol/L      CO2 26.0 mmol/L      Calcium 8.0 mg/dL      BUN/Creatinine Ratio 11.3     Anion Gap 7.0 mmol/L      eGFR 62.8 mL/min/1.73      Comment: National Kidney Foundation and American Society of Nephrology (ASN) Task Force  recommended calculation based on the Chronic Kidney Disease Epidemiology Collaboration (CKD-EPI) equation refit without adjustment for race.       Narrative:      GFR Normal >60  Chronic Kidney Disease <60  Kidney Failure <15      Magnesium [[id number]]  (Normal) Collected: 04/11/22 1123    Specimen: Blood Updated: 04/11/22 1246     Magnesium 2.1 mg/dL     Phosphorus [[id number]]  (Normal) Collected: 04/11/22 1123    Specimen: Blood Updated: 04/11/22 1246     Phosphorus 3.3 mg/dL     CBC (No Diff) [[id number]]  (Abnormal) Collected: 04/11/22 1123    Specimen: Blood Updated: 04/11/22 1230     WBC 5.50 10*3/mm3      RBC 3.28 10*6/mm3      Hemoglobin 8.8 g/dL      Hematocrit 28.2 %      MCV 86.0 fL      MCH 26.8 pg      MCHC 31.2 g/dL      RDW 15.9 %      RDW-SD 49.4 fl      MPV 11.3 fL      Platelets 288 10*3/mm3     POC Glucose Once [[id number]]  (Normal) Collected: 04/11/22 1103    Specimen: Blood Updated: 04/11/22 1105     Glucose 93 mg/dL      Comment: Meter: [id number] : [id number] Sen Trent NA       POC Glucose Once [[id number]]  (Normal) Collected: 04/11/22 0537    Specimen: Blood Updated: 04/11/22 0538     Glucose 98 mg/dL      Comment: Meter: [id number] : [id number] Ceasar Gladys NA       POC Glucose Once [[id number]]  (Normal) Collected: 04/11/22 0241    Specimen: Blood Updated: 04/11/22 0242     Glucose 97 mg/dL      Comment: Meter: [id number] : [id number] Ceasar Gladys NA       POC Glucose Once [[id number]]  (Normal) Collected: 04/10/22 1949    Specimen: Blood Updated: 04/10/22 1950     Glucose 113 mg/dL      Comment: Meter: [id number] : [id number] Jason Sweeney RN       POC Glucose Once [[id number]]  (Abnormal) Collected: 04/10/22 1602    Specimen: Blood Updated: 04/10/22 1603     Glucose 159 mg/dL      Comment: Meter: [id number] : [id number] Leon Reyes Clausia NA           Data Review:  Results from last 7 days   Lab Units 04/16/22  1014 04/15/22  0806 04/14/22  0848   SODIUM mmol/L  135* 137 134*   POTASSIUM mmol/L 4.5 4.0 4.1   CHLORIDE mmol/L 104 104 103   CO2 mmol/L 21.0* 26.5 24.0   BUN mg/dL 14 12 14   CREATININE mg/dL 1.18 1.09 1.01   GLUCOSE mg/dL 175* 103* 159*   CALCIUM mg/dL 8.3* 8.8 8.2*     Results from last 7 days   Lab Units 04/16/22  1014 04/15/22  0806 04/14/22  0848   WBC 10*3/mm3 7.19 4.22 5.01   HEMOGLOBIN g/dL 10.0* 10.0* 9.3*   HEMATOCRIT % 32.4* 31.6* 30.5*   PLATELETS 10*3/mm3 259 273 271             Lab Results   Lab Value Date/Time    TROPONINT 0.030 04/06/2022 0002    TROPONINT 0.018 03/30/2022 1625    TROPONINT 0.027 02/18/2022 2305    TROPONINT 0.011 10/23/2021 1617    TROPONINT 0.017 07/15/2021 2029    TROPONINT <0.010 01/13/2020 0747    TROPONINT <0.010 03/30/2017 0809               Invalid input(s): PROT, LABALBU          Glucose   Date/Time Value Ref Range Status   04/16/2022 1137 177 (H) 70 - 130 mg/dL Final     Comment:     Meter: [id number] : [id number] Fillmore Nando NA   04/16/2022 0604 108 70 - 130 mg/dL Final     Comment:     Meter: [id number] : [id number] Dave Castilloa MARCELLUS   04/15/2022 2058 152 (H) 70 - 130 mg/dL Final     Comment:     Meter: [id number] : [id number] Dave CeConda MARCELLUS   04/15/2022 1612 133 (H) 70 - 130 mg/dL Final     Comment:     Meter: [id number] : [id number] Sen Trent NA   04/15/2022 1123 193 (H) 70 - 130 mg/dL Final     Comment:     Meter: [id number] : [id number] Debbi Nando NA   04/15/2022 0615 92 70 - 130 mg/dL Final     Comment:     Meter: [id number] : [id number] Edgardo iMn NA   04/14/2022 2002 125 70 - 130 mg/dL Final     Comment:     Meter: [id number] : ARTURO Jordan RN   04/14/2022 1550 94 70 - 130 mg/dL Final     Comment:     Meter: [id number] : [id number] Alyssa ALEJO     Results from last 7 days   Lab Units 04/16/22  1014 04/15/22  1723   INR  1.20* 1.18*       Past Medical History:   Diagnosis Date   • Allergic rhinitis    • Aortic valve insufficiency    •  Ascending aortic aneurysm (HCC)    • Atrial fibrillation (HCC)    • Bacteremia    • Calcific aortic stenosis of bicuspid valve    • Cardiac arrest (HCC)    • Cardiomyopathy (HCC)    • CKD (chronic kidney disease) stage 3, GFR 30-59 ml/min (HCC)    • Contact dermatitis due to poison ivy    • Elbow fracture    • Esophageal reflux    • GERD (gastroesophageal reflux disease)    • Head injury    • History of transfusion    • Hyperglycemia    • Hyperlipidemia    • Hypertension    • Kidney carcinoma (HCC)    • Nonischemic cardiomyopathy (HCC)    • Renal oncocytoma    • Seasonal allergic reaction    • Sinusitis    • Syncope    • Type 2 diabetes mellitus (HCC)     uncontrolled   • Visual field defect        Assessment:  Active Hospital Problems    Diagnosis  POA   • **Hematoma of iliopsoas muscle, left, initial encounter [S70.12XA]  Yes   • History of CVA (cerebrovascular accident) [Z86.73]  Not Applicable   • S/P laparoscopic cholecystectomy [Z90.49]  Not Applicable   • Anemia [D64.9]  Yes   • Chronic anticoagulation [Z79.01]  Not Applicable   • Stage 3b chronic kidney disease (HCC) [N18.32]  Yes   • H/O heart valve replacement with mechanical valve [Z95.2]  Not Applicable   • Type 2 diabetes mellitus (HCC) [E11.9]  Yes   • Hypertension [I10]  Yes   • Atrial fibrillation (HCC) [I48.91]  Yes      Resolved Hospital Problems   No resolved problems to display.       Plan:  Continue with current RX as per cardiology. Restart coumadin  Will need SNU VS acute rehab for rehab. Follow lab.  DC planning. Probably needs a few more days    Leobardo Juarez MD  4/16/2022  15:15 EDT

## 2022-04-16 NOTE — PROGRESS NOTES
[facility]     Met with patient and spouse at bedside.  Patient was sleeping but wife very interested in acute rehab and states patient is too.  Patient has been in and out of the hospital, but at baseline, wife states patient uses a walker.  Patient has been active with [facility].     Criteria for acute rehab was reviewed and explained to wife.  Wife understands Rehab Admissions will review with Dr. Andrade, Rehab Medical Director, on Monday to best determine most appropriate level of rehab at discharge.  If patient accepted, it will be pending an insurance precertification along with bed availability on the rehab unit.     Will follow up with CCP and wife once Dr. Andrade has reviewed.    Thanks,   Ana Barfield RN  Rehab Admission Nurse   [phone]

## 2022-04-16 NOTE — PLAN OF CARE
Goal Outcome Evaluation:  Plan of Care Reviewed With: patient        Progress: improving  Outcome Evaluation: Patient remains in chronic afib on monitor, SATs in high 90s on RA. Wound care performed.Q2 turn. Wound care performed, will continue to monitor.

## 2022-04-17 LAB
ANION GAP SERPL CALCULATED.3IONS-SCNC: 7.5 MMOL/L (ref 5–15)
BUN SERPL-MCNC: 17 MG/DL (ref 8–23)
BUN/CREAT SERPL: 13.2 (ref 7–25)
CALCIUM SPEC-SCNC: 8.3 MG/DL (ref 8.6–10.5)
CHLORIDE SERPL-SCNC: 105 MMOL/L (ref 98–107)
CO2 SERPL-SCNC: 24.5 MMOL/L (ref 22–29)
CREAT SERPL-MCNC: 1.29 MG/DL (ref 0.76–1.27)
DEPRECATED RDW RBC AUTO: 49.5 FL (ref 37–54)
EGFRCR SERPLBLD CKD-EPI 2021: 54.7 ML/MIN/1.73
ERYTHROCYTE [DISTWIDTH] IN BLOOD BY AUTOMATED COUNT: 15.8 % (ref 12.3–15.4)
GLUCOSE BLDC GLUCOMTR-MCNC: 129 MG/DL (ref 70–130)
GLUCOSE BLDC GLUCOMTR-MCNC: 155 MG/DL (ref 70–130)
GLUCOSE BLDC GLUCOMTR-MCNC: 158 MG/DL (ref 70–130)
GLUCOSE BLDC GLUCOMTR-MCNC: 183 MG/DL (ref 70–130)
GLUCOSE SERPL-MCNC: 157 MG/DL (ref 65–99)
HCT VFR BLD AUTO: 29.4 % (ref 37.5–51)
HGB BLD-MCNC: 9 G/DL (ref 13–17.7)
INR PPP: 1.44 (ref 0.9–1.1)
MAGNESIUM SERPL-MCNC: 2.3 MG/DL (ref 1.6–2.4)
MCH RBC QN AUTO: 26.5 PG (ref 26.6–33)
MCHC RBC AUTO-ENTMCNC: 30.6 G/DL (ref 31.5–35.7)
MCV RBC AUTO: 86.5 FL (ref 79–97)
PHOSPHATE SERPL-MCNC: 3 MG/DL (ref 2.5–4.5)
PLATELET # BLD AUTO: 214 10*3/MM3 (ref 140–450)
PMV BLD AUTO: 11.1 FL (ref 6–12)
POTASSIUM SERPL-SCNC: 4.6 MMOL/L (ref 3.5–5.2)
PROTHROMBIN TIME: 17.4 SECONDS (ref 11.7–14.2)
RBC # BLD AUTO: 3.4 10*6/MM3 (ref 4.14–5.8)
SODIUM SERPL-SCNC: 137 MMOL/L (ref 136–145)
WBC NRBC COR # BLD: 3.99 10*3/MM3 (ref 3.4–10.8)

## 2022-04-17 PROCEDURE — 84100 ASSAY OF PHOSPHORUS: CPT | Performed by: STUDENT IN AN ORGANIZED HEALTH CARE EDUCATION/TRAINING PROGRAM

## 2022-04-17 PROCEDURE — 83735 ASSAY OF MAGNESIUM: CPT | Performed by: STUDENT IN AN ORGANIZED HEALTH CARE EDUCATION/TRAINING PROGRAM

## 2022-04-17 PROCEDURE — 85027 COMPLETE CBC AUTOMATED: CPT | Performed by: STUDENT IN AN ORGANIZED HEALTH CARE EDUCATION/TRAINING PROGRAM

## 2022-04-17 PROCEDURE — 25010000002 ENOXAPARIN PER 10 MG: Performed by: HOSPITALIST

## 2022-04-17 PROCEDURE — 85610 PROTHROMBIN TIME: CPT | Performed by: NURSE PRACTITIONER

## 2022-04-17 PROCEDURE — 36415 COLL VENOUS BLD VENIPUNCTURE: CPT | Performed by: STUDENT IN AN ORGANIZED HEALTH CARE EDUCATION/TRAINING PROGRAM

## 2022-04-17 PROCEDURE — 97530 THERAPEUTIC ACTIVITIES: CPT

## 2022-04-17 PROCEDURE — 82962 GLUCOSE BLOOD TEST: CPT

## 2022-04-17 PROCEDURE — 97110 THERAPEUTIC EXERCISES: CPT

## 2022-04-17 PROCEDURE — 80048 BASIC METABOLIC PNL TOTAL CA: CPT | Performed by: STUDENT IN AN ORGANIZED HEALTH CARE EDUCATION/TRAINING PROGRAM

## 2022-04-17 RX ADMIN — ENOXAPARIN SODIUM 70 MG: 100 INJECTION SUBCUTANEOUS at 21:58

## 2022-04-17 RX ADMIN — ATORVASTATIN CALCIUM 40 MG: 20 TABLET, FILM COATED ORAL at 08:48

## 2022-04-17 RX ADMIN — MAGNESIUM OXIDE 400 MG (241.3 MG MAGNESIUM) TABLET 400 MG: TABLET at 08:48

## 2022-04-17 RX ADMIN — Medication 10 ML: at 08:48

## 2022-04-17 RX ADMIN — FAMOTIDINE 20 MG: 20 TABLET ORAL at 08:48

## 2022-04-17 RX ADMIN — Medication 10 ML: at 21:59

## 2022-04-17 RX ADMIN — ENOXAPARIN SODIUM 70 MG: 100 INJECTION SUBCUTANEOUS at 08:48

## 2022-04-17 RX ADMIN — FAMOTIDINE 20 MG: 20 TABLET ORAL at 17:57

## 2022-04-17 RX ADMIN — METOPROLOL SUCCINATE 12.5 MG: 25 TABLET, EXTENDED RELEASE ORAL at 08:48

## 2022-04-17 RX ADMIN — DIGOXIN 62.5 MCG: 125 TABLET ORAL at 12:12

## 2022-04-17 RX ADMIN — Medication 1 CAPSULE: at 08:48

## 2022-04-17 RX ADMIN — MAGNESIUM OXIDE 400 MG (241.3 MG MAGNESIUM) TABLET 400 MG: TABLET at 21:58

## 2022-04-17 RX ADMIN — WARFARIN 7.5 MG: 7.5 TABLET ORAL at 17:57

## 2022-04-17 NOTE — PROGRESS NOTES
"DAILY PROGRESS NOTE  [facility]    Patient Identification:  Name: Francisco Sequeira  Age: 86 y.o.  Sex: male  :  1935  MRN: [id number]         Primary Care Physician: Rubin Hinkle APRN    Subjective:  Interval History:His right leg is weak.   He is weak.    Objective:    Scheduled Meds:atorvastatin, 40 mg, Oral, Daily  digoxin, 62.5 mcg, Oral, Daily  enoxaparin, 1 mg/kg, Subcutaneous, BID  famotidine, 20 mg, Oral, BID AC  ferrous sulfate, 325 mg, Oral, Every Other Day  insulin glargine, 15 Units, Subcutaneous, Nightly  insulin lispro, 0-9 Units, Subcutaneous, TID AC  lactobacillus acidophilus, 1 capsule, Oral, Daily  magnesium oxide, 400 mg, Oral, BID  metoprolol succinate XL, 12.5 mg, Oral, Daily  sodium chloride, 10 mL, Intravenous, Q12H  warfarin, 7.5 mg, Oral, Daily      Continuous Infusions:Pharmacy to dose warfarin,         Vital signs in last 24 hours:  Temp:  [97.5 °F (36.4 °C)-98.2 °F (36.8 °C)] 97.5 °F (36.4 °C)  Heart Rate:  [66-84] 66  Resp:  [16] 16  BP: (101-114)/(51-59) 101/54    Intake/Output:    Intake/Output Summary (Last 24 hours) at 2022 1721  Last data filed at 2022 0500  Gross per 24 hour   Intake 210 ml   Output 500 ml   Net -290 ml       Exam:  /54 (BP Location: Right arm, Patient Position: Lying)   Pulse 66   Temp 97.5 °F (36.4 °C) (Oral)   Resp 16   Ht 182.9 cm (72\")   Wt 68 kg (150 lb)   SpO2 100%   BMI 20.34 kg/m²     General Appearance:    Alert, cooperative, no distress   Head:    Normocephalic, without obvious abnormality, atraumatic   Eyes:       Throat:   Lips, tongue, gums normal   Neck:   Supple, symmetrical, trachea midline, no JVD   Lungs:     Clear to auscultation bilaterally, respirations unlabored   Chest Wall:    No tenderness or deformity    Heart:    Regular rate and rhythm, S1 and S2 normal, no murmur,no  Rub or gallop   Abdomen:     Soft, nontender, bowel sounds active, no masses, no organomegaly    Extremities:   " Extremities normal, atraumatic, no cyanosis or edema   Pulses:      Skin:   Skin is warm and dry,  no rashes or palpable lesions   Neurologic:   Right leg weakness      Lab Results (last 72 hours)     Procedure Component Value Units Date/Time    Digoxin Level [[id number]]  (Abnormal) Collected: 04/13/22 0652    Specimen: Blood Updated: 04/13/22 1121     Digoxin 0.50 ng/mL     POC Glucose Once [[id number]]  (Abnormal) Collected: 04/13/22 1116    Specimen: Blood Updated: 04/13/22 1119     Glucose 144 mg/dL      Comment: Meter: [id number] : [id number] Alyssafay ALEJO       Basic Metabolic Panel [[id number]]  (Abnormal) Collected: 04/13/22 0652    Specimen: Blood Updated: 04/13/22 0750     Glucose 105 mg/dL      BUN 14 mg/dL      Creatinine 0.91 mg/dL      Sodium 136 mmol/L      Potassium 4.4 mmol/L      Chloride 105 mmol/L      CO2 24.0 mmol/L      Calcium 8.3 mg/dL      BUN/Creatinine Ratio 15.4     Anion Gap 7.0 mmol/L      eGFR 83.1 mL/min/1.73      Comment: National Kidney Foundation and American Society of Nephrology (ASN) Task Force recommended calculation based on the Chronic Kidney Disease Epidemiology Collaboration (CKD-EPI) equation refit without adjustment for race.       Narrative:      GFR Normal >60  Chronic Kidney Disease <60  Kidney Failure <15      Phosphorus [[id number]]  (Normal) Collected: 04/13/22 0652    Specimen: Blood Updated: 04/13/22 0750     Phosphorus 3.1 mg/dL     Magnesium [[id number]]  (Normal) Collected: 04/13/22 0652    Specimen: Blood Updated: 04/13/22 0750     Magnesium 2.3 mg/dL     Body Fluid Culture - Aspirate, Psoas [[id number]] Collected: 04/11/22 1305    Specimen: Aspirate from Psoas Updated: 04/13/22 0734     Body Fluid Culture No growth at 2 days     Gram Stain Few (2+) WBCs per low power field      No organisms seen    CBC (No Diff) [[id number]]  (Abnormal) Collected: 04/13/22 0652    Specimen: Blood Updated: 04/13/22 0716     WBC 4.12 10*3/mm3      RBC 3.51 10*6/mm3       Hemoglobin 9.3 g/dL      Hematocrit 29.8 %      MCV 84.9 fL      MCH 26.5 pg      MCHC 31.2 g/dL      RDW 16.3 %      RDW-SD 50.6 fl      MPV 10.7 fL      Platelets 259 10*3/mm3     POC Glucose Once [[id number]]  (Normal) Collected: 04/13/22 0639    Specimen: Blood Updated: 04/13/22 0640     Glucose 101 mg/dL      Comment: Meter: [id number] : [id number] Gamboa Damon NA       POC Glucose Once [[id number]]  (Normal) Collected: 04/13/22 0236    Specimen: Blood Updated: 04/13/22 0237     Glucose 104 mg/dL      Comment: Meter: [id number] : [id number] Jason Sweeney RN       POC Glucose Once [[id number]]  (Abnormal) Collected: 04/12/22 1950    Specimen: Blood Updated: 04/12/22 1951     Glucose 143 mg/dL      Comment: Meter: [id number] : [id number] Gamboa Damon NA       POC Glucose Once [[id number]]  (Normal) Collected: 04/12/22 1610    Specimen: Blood Updated: 04/12/22 1612     Glucose 108 mg/dL      Comment: Meter: [id number] : [id number] Abhinav ALEJO       Basic Metabolic Panel [[id number]]  (Abnormal) Collected: 04/12/22 1157    Specimen: Blood Updated: 04/12/22 1233     Glucose 159 mg/dL      BUN 16 mg/dL      Creatinine 1.19 mg/dL      Sodium 134 mmol/L      Potassium 4.6 mmol/L      Chloride 102 mmol/L      CO2 24.7 mmol/L      Calcium 8.3 mg/dL      BUN/Creatinine Ratio 13.4     Anion Gap 7.3 mmol/L      eGFR 60.2 mL/min/1.73      Comment: National Kidney Foundation and American Society of Nephrology (ASN) Task Force recommended calculation based on the Chronic Kidney Disease Epidemiology Collaboration (CKD-EPI) equation refit without adjustment for race.       Narrative:      GFR Normal >60  Chronic Kidney Disease <60  Kidney Failure <15      Phosphorus [[id number]]  (Normal) Collected: 04/12/22 1157    Specimen: Blood Updated: 04/12/22 1233     Phosphorus 3.1 mg/dL     Magnesium [[id number]]  (Normal) Collected: 04/12/22 1157    Specimen: Blood Updated: 04/12/22 1233     Magnesium 2.2 mg/dL     CBC  (No Diff) [[id number]]  (Abnormal) Collected: 04/12/22 1157    Specimen: Blood Updated: 04/12/22 1211     WBC 5.56 10*3/mm3      RBC 3.33 10*6/mm3      Hemoglobin 8.7 g/dL      Hematocrit 28.5 %      MCV 85.6 fL      MCH 26.1 pg      MCHC 30.5 g/dL      RDW 16.1 %      RDW-SD 49.5 fl      MPV 10.9 fL      Platelets 273 10*3/mm3     POC Glucose Once [[id number]]  (Abnormal) Collected: 04/12/22 1106    Specimen: Blood Updated: 04/12/22 1108     Glucose 154 mg/dL      Comment: Meter: [id number] : [id number] Abhinav ALEJO       POC Glucose Once [[id number]]  (Normal) Collected: 04/12/22 0621    Specimen: Blood Updated: 04/12/22 0622     Glucose 92 mg/dL      Comment: Meter: [id number] : [id number] Markos Nataia NA       POC Glucose Once [[id number]]  (Abnormal) Collected: 04/12/22 0602    Specimen: Blood Updated: 04/12/22 0604     Glucose 37 mg/dL      Comment: RN Notified R and V Meter: [id number] : [id number] Markos Nataia NA       POC Glucose Once [[id number]]  (Abnormal) Collected: 04/12/22 0601    Specimen: Blood Updated: 04/12/22 0602     Glucose 33 mg/dL      Comment: Repeat Test Meter: [id number] : [id number] Markos Nataia NA       POC Glucose Once [[id number]]  (Normal) Collected: 04/11/22 1936    Specimen: Blood Updated: 04/11/22 1938     Glucose 125 mg/dL      Comment: Meter: [id number] : [id number] Markos Nataia NA       POC Glucose Once [[id number]]  (Abnormal) Collected: 04/11/22 1610    Specimen: Blood Updated: 04/11/22 1611     Glucose 172 mg/dL      Comment: RN Notified R and V Meter: [id number] : [id number] Sen ALEJO       Basic Metabolic Panel [[id number]]  (Abnormal) Collected: 04/11/22 1123    Specimen: Blood Updated: 04/11/22 1246     Glucose 103 mg/dL      BUN 13 mg/dL      Creatinine 1.15 mg/dL      Sodium 136 mmol/L      Potassium 4.5 mmol/L      Chloride 103 mmol/L      CO2 26.0 mmol/L      Calcium 8.0 mg/dL      BUN/Creatinine Ratio 11.3     Anion Gap 7.0 mmol/L       eGFR 62.8 mL/min/1.73      Comment: National Kidney Foundation and American Society of Nephrology (ASN) Task Force recommended calculation based on the Chronic Kidney Disease Epidemiology Collaboration (CKD-EPI) equation refit without adjustment for race.       Narrative:      GFR Normal >60  Chronic Kidney Disease <60  Kidney Failure <15      Magnesium [[id number]]  (Normal) Collected: 04/11/22 1123    Specimen: Blood Updated: 04/11/22 1246     Magnesium 2.1 mg/dL     Phosphorus [[id number]]  (Normal) Collected: 04/11/22 1123    Specimen: Blood Updated: 04/11/22 1246     Phosphorus 3.3 mg/dL     CBC (No Diff) [[id number]]  (Abnormal) Collected: 04/11/22 1123    Specimen: Blood Updated: 04/11/22 1230     WBC 5.50 10*3/mm3      RBC 3.28 10*6/mm3      Hemoglobin 8.8 g/dL      Hematocrit 28.2 %      MCV 86.0 fL      MCH 26.8 pg      MCHC 31.2 g/dL      RDW 15.9 %      RDW-SD 49.4 fl      MPV 11.3 fL      Platelets 288 10*3/mm3     POC Glucose Once [[id number]]  (Normal) Collected: 04/11/22 1103    Specimen: Blood Updated: 04/11/22 1105     Glucose 93 mg/dL      Comment: Meter: [id number] : [id number] Sen rTent NA       POC Glucose Once [[id number]]  (Normal) Collected: 04/11/22 0537    Specimen: Blood Updated: 04/11/22 0538     Glucose 98 mg/dL      Comment: Meter: [id number] : [id number] Ceasar Gladys NA       POC Glucose Once [[id number]]  (Normal) Collected: 04/11/22 0241    Specimen: Blood Updated: 04/11/22 0242     Glucose 97 mg/dL      Comment: Meter: [id number] : [id number] Ceasar Gladys NA       POC Glucose Once [[id number]]  (Normal) Collected: 04/10/22 1949    Specimen: Blood Updated: 04/10/22 1950     Glucose 113 mg/dL      Comment: Meter: [id number] : [id number] Jason Sweeney RN       POC Glucose Once [[id number]]  (Abnormal) Collected: 04/10/22 1602    Specimen: Blood Updated: 04/10/22 1603     Glucose 159 mg/dL      Comment: Meter: [id number] : [id number] Leon Reyes Clausia NA            Data Review:  Results from last 7 days   Lab Units 04/17/22  0838 04/16/22  1014 04/15/22  0806   SODIUM mmol/L 137 135* 137   POTASSIUM mmol/L 4.6 4.5 4.0   CHLORIDE mmol/L 105 104 104   CO2 mmol/L 24.5 21.0* 26.5   BUN mg/dL 17 14 12   CREATININE mg/dL 1.29* 1.18 1.09   GLUCOSE mg/dL 157* 175* 103*   CALCIUM mg/dL 8.3* 8.3* 8.8     Results from last 7 days   Lab Units 04/17/22  0838 04/16/22  1014 04/15/22  0806   WBC 10*3/mm3 3.99 7.19 4.22   HEMOGLOBIN g/dL 9.0* 10.0* 10.0*   HEMATOCRIT % 29.4* 32.4* 31.6*   PLATELETS 10*3/mm3 214 259 273             Lab Results   Lab Value Date/Time    TROPONINT 0.030 04/06/2022 0002    TROPONINT 0.018 03/30/2022 1625    TROPONINT 0.027 02/18/2022 2305    TROPONINT 0.011 10/23/2021 1617    TROPONINT 0.017 07/15/2021 2029    TROPONINT <0.010 01/13/2020 0747    TROPONINT <0.010 03/30/2017 0809               Invalid input(s): PROT, LABALBU          Glucose   Date/Time Value Ref Range Status   04/17/2022 1559 183 (H) 70 - 130 mg/dL Final     Comment:     Meter: [id number] : [id number] Anuragbel Baughtyn NA   04/17/2022 1116 158 (H) 70 - 130 mg/dL Final     Comment:     Meter: [id number] : [id number] Anurag Baughtyn NA   04/17/2022 0627 129 70 - 130 mg/dL Final     Comment:     Meter: [id number] : [id number] Pavan Garcia NA   04/16/2022 2054 150 (H) 70 - 130 mg/dL Final     Comment:     Meter: [id number] : [id number] Kade Amezcua NA   04/16/2022 1605 148 (H) 70 - 130 mg/dL Final     Comment:     Meter: [id number] : [id number] GONZALEZ NA   04/16/2022 1137 177 (H) 70 - 130 mg/dL Final     Comment:     Meter: [id number] : [id number] Debbi Collier NA   04/16/2022 0604 108 70 - 130 mg/dL Final     Comment:     Meter: [id number] : [id number] Dave Najera MARCELLUS   04/15/2022 2058 152 (H) 70 - 130 mg/dL Final     Comment:     Meter: [id number] : [id number] Dave Najera MARCELLUS     Results from last 7 days   Lab Units 04/17/22  0838 04/16/22  1014  04/15/22  1723   INR  1.44* 1.20* 1.18*       Past Medical History:   Diagnosis Date   • Allergic rhinitis    • Aortic valve insufficiency    • Ascending aortic aneurysm (HCC)    • Atrial fibrillation (HCC)    • Bacteremia    • Calcific aortic stenosis of bicuspid valve    • Cardiac arrest (HCC)    • Cardiomyopathy (HCC)    • CKD (chronic kidney disease) stage 3, GFR 30-59 ml/min (HCC)    • Contact dermatitis due to poison ivy    • Elbow fracture    • Esophageal reflux    • GERD (gastroesophageal reflux disease)    • Head injury    • History of transfusion    • Hyperglycemia    • Hyperlipidemia    • Hypertension    • Kidney carcinoma (HCC)    • Nonischemic cardiomyopathy (HCC)    • Renal oncocytoma    • Seasonal allergic reaction    • Sinusitis    • Syncope    • Type 2 diabetes mellitus (HCC)     uncontrolled   • Visual field defect        Assessment:  Active Hospital Problems    Diagnosis  POA   • **Hematoma of iliopsoas muscle, left, initial encounter [S70.12XA]  Yes   • History of CVA (cerebrovascular accident) [Z86.73]  Not Applicable   • S/P laparoscopic cholecystectomy [Z90.49]  Not Applicable   • Anemia [D64.9]  Yes   • Chronic anticoagulation [Z79.01]  Not Applicable   • Stage 3b chronic kidney disease (HCC) [N18.32]  Yes   • H/O heart valve replacement with mechanical valve [Z95.2]  Not Applicable   • Type 2 diabetes mellitus (HCC) [E11.9]  Yes   • Hypertension [I10]  Yes   • Atrial fibrillation (HCC) [I48.91]  Yes      Resolved Hospital Problems   No resolved problems to display.       Plan:  Continue with current RX as per cardiology.  Continue coumadin  Will need SNU VS acute rehab for rehab. Follow lab.  DC planning. Probably needs a few more days.  Working on placement.    Leobardo Juarez MD  4/17/2022  17:21 EDT

## 2022-04-17 NOTE — THERAPY TREATMENT NOTE
Patient Name: Francisco Sequeira  : 1935    MRN: [id number]                              Today's Date: 2022       Admit Date: 2022    Visit Dx:     ICD-10-CM ICD-9-CM   1. Hematoma of iliopsoas muscle, left, initial encounter  S70.12XA 924.00   2. Chronic anticoagulation  Z79.01 V58.61   3. H/O heart valve replacement with mechanical valve  Z95.2 V43.3   4. Immobility syndrome  M62.3 728.3   5. Anemia, unspecified type  D64.9 285.9   6. 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)   • Iron deficiency anemia   • Chronic anticoagulation   • Hemiparesis of left nondominant side as late effect of cerebral infarction (HCC)   • Rectus sheath hematoma   • Sepsis (HCC)   • Calculus of gallbladder with acute cholecystitis without obstruction   • History of Clostridium difficile infection   • Aspiration pneumonitis (HCC)   • Hematoma of iliopsoas muscle, left, initial encounter   • History of CVA (cerebrovascular accident)   • S/P laparoscopic cholecystectomy   • Anemia     Past Medical History:   Diagnosis Date   • Allergic rhinitis    • Aortic valve insufficiency    • Ascending aortic aneurysm (HCC)    • Atrial fibrillation (HCC)    • Bacteremia    • Calcific aortic stenosis of bicuspid valve    • Cardiac arrest (HCC)    • Cardiomyopathy (HCC)    • CKD (chronic kidney disease) stage 3, GFR 30-59 ml/min (HCC)    • Contact dermatitis due to poison ivy    • Elbow fracture    • Esophageal reflux    • GERD (gastroesophageal reflux disease)    • Head  injury    • History of transfusion    • Hyperglycemia    • Hyperlipidemia    • Hypertension    • Kidney carcinoma (HCC)    • Nonischemic cardiomyopathy (HCC)    • Renal oncocytoma    • Seasonal allergic reaction    • Sinusitis    • Syncope    • Type 2 diabetes mellitus (HCC)     uncontrolled   • Visual field defect      Past Surgical History:   Procedure Laterality Date   • AORTIC VALVE REPAIR/REPLACEMENT     • ASCENDING AORTIC ANEURYSM REPAIR W/ MECHANICAL AORTIC VALVE REPLACEMENT     • CHOLECYSTECTOMY WITH INTRAOPERATIVE CHOLANGIOGRAM N/A 3/29/2022    Procedure: Laparoscopic cholecystectomy with cholangiogram, possible open;  Surgeon: Lisa Guidry MD;  Location: [facility] MAIN OR;  Service: General;  Laterality: N/A;   • COLONOSCOPY N/A 10/28/2021    Procedure: COLONOSCOPY to cecum:  cold snare polyps,;  Surgeon: Dinesh Meza MD;  Location: [facility] ENDOSCOPY;  Service: Gastroenterology;  Laterality: N/A;  pre:  Iron deficiency anemia  post:  polyps, diverticulosis,    • COLONOSCOPY N/A 11/9/2021    Procedure: COLONOSCOPY to cecum with APC cautery of AVM and clip placement x1;  Surgeon: Pavan Rodriguez MD;  Location: [facility] ENDOSCOPY;  Service: Gastroenterology;  Laterality: N/A;  PRE - gi bleed, anemia  POST - diverticulosis, poor prep, AVM right colon   • ENDOSCOPY N/A 10/28/2021    Procedure: ESOPHAGOGASTRODUODENOSCOPY with biopsies;  Surgeon: Dinesh Meza MD;  Location: [facility] ENDOSCOPY;  Service: Gastroenterology;  Laterality: N/A;  pre:  Iron deficiency anemia  post:  duodenitis and gastritis   • EYE SURGERY  12/09/2020    cataract surgery    • NEPHRECTOMY     • OTHER SURGICAL HISTORY      elbow surgery   • OTHER SURGICAL HISTORY      right arm surgery   • PROSTATE SURGERY     • THORACENTESIS Left     diagnostic      General Information     Row Name 04/17/22 1555          Physical Therapy Time and Intention    Document Type therapy note (daily note)  -EJ     Mode of Treatment physical  therapy  -     Row Name 04/17/22 1555          General Information    Existing Precautions/Restrictions fall  -EJ           User Key  (r) = Recorded By, (t) = Taken By, (c) = Cosigned By    Initials Name Provider Type    EJ Lorri Knox, PT Physical Therapist               Mobility     Row Name 04/17/22 1557          Bed Mobility    Supine-Sit S[place]ent (Bed Mobility) verbal cues;moderate assist (50% patient effort);2 person assist  -EJ     Sit-Supine [place] (Bed Mobility) not tested  -EJ     Assistive Device (Bed Mobility) bed rails;head of bed elevated  -     Row Name 04/17/22 1557          Bed-Chair Transfer    Bed-Chair Sargent (Transfers) nonverbal cues (demo/gesture);verbal cues;maximum assist (25% patient effort);2 person assist  -EJ     Assistive Device (Bed-Chair Transfers) walker, front-wheeled  -EJ     Comment, (Bed-Chair Transfer) L knee elizabeth hard, cues to use BUE on Rwx for improved support, therapist blocking L knee throughout transfer  -     Row Name 04/17/22 1557          Sit-Stand Transfer    Sit-Stand Sargent (Transfers) moderate assist (50% patient effort);2 person assist;verbal cues;nonverbal cues (demo/gesture)  -EJ     Assistive Device (Sit-Stand Transfers) walker, front-wheeled  -EJ     Comment, (Sit-Stand Transfer) LLE blocked  -     Row Name 04/17/22 1557          Gait/Stairs (Locomotion)    [place]ent Level (Gait) moderate assist (50% patient effort);2 person assist;verbal cues;nonverbal cues (demo/gesture);maximum assist (25% patient effort)  -EJ     Assistive Device (Gait) walker, front-wheeled  -EJ     Distance in Feet (Gait) 4  -EJ     Bilateral Gait Deviations weight shift ability decreased;forward flexed posture;heel strike decreased  -EJ     Left Sided Gait Deviations knee buckling, left side  -EJ     Comment, (Gait/Stairs) several steps to transfer from bed to chair  -EJ           User Key  (r) = Recorded By, (t) = Taken By, (c) = Cosigned By     Initials Name Provider Type    EJ Lorri Knox, PT Physical Therapist               Obj/Interventions     Row Name 04/17/22 1616          Motor Skills    Therapeutic Exercise --  reviewed BLE HEP w pt including AP, quad sets, hip abduction, SLR, seated LAQ, resisted leg extension- wife assists pt w exercises during the day  -           User Key  (r) = Recorded By, (t) = Taken By, (c) = Cosigned By    Initials Name Provider Type    Lorri Hendricks, PT Physical Therapist               Goals/Plan    No documentation.                Clinical Impression     Row Name 04/17/22 1617          Pain    Pretreatment Pain Rating 0/10 - no pain  -     Row Name 04/17/22 1617          Plan of Care Review    Plan of Care Reviewed With patient;spouse  -     Progress improving  -     Outcome Evaluation Pt seen for PT this afternoon. He is agreeable w no complaints at this time. He is eager to move and get up to chair today. He still has significant L quad weakness. He was able to sit EOB w mod A. He requires mod A x 2 to come to stand. L knee elizabeth with any weight bearing and therapist blocking L Knee to provide support and assist w quad control. He took several steps to transfer to chair with therapist blocking L knee throughout. Pt would benefit from acute rehab at [place]. Discussed w RN and wife. Will continue to progress as tolerated.  -     Row Name 04/17/22 1617          Positioning and Restraints    Pre-Treatment Position in bed  -EJ     Post Treatment Position chair  -EJ     In Chair notified nsg;reclined;call light within reach;encouraged to call for assist;exit alarm on;with family/caregiver  -           User Key  (r) = Recorded By, (t) = Taken By, (c) = Cosigned By    Initials Name Provider Type    Lorri Hendricks, PT Physical Therapist               Outcome Measures     Row Name 04/17/22 1620          How much help from another person do you currently need...    Turning from your back to your  side while in flat bed without using bedrails? 3  -EJ     Moving from lying on back to sitting on the side of a flat bed without bedrails? 2  -EJ     Moving to and from a bed to a chair (including a wheelchair)? 2  -EJ     Standing up from a chair using your arms (e.g., wheelchair, bedside chair)? 2  -EJ     Climbing 3-5 steps with a railing? 1  -EJ     To walk in hospital room? 2  -EJ     AM-PAC 6 Clicks Score (PT) 12  -EJ           User Key  (r) = Recorded By, (t) = Taken By, (c) = Cosigned By    Initials Name Provider Type    Lorri Hendricks, PT Physical Therapist                             Physical Therapy Education                 Title: PT OT SLP Therapies (In Progress)     Topic: Physical Therapy (In Progress)     Point: Mobility training (In Progress)     Learning Progress Summary           Patient Acceptance, E,TB,D, VU,NR by  at 4/17/2022 1621    Acceptance, E, NR by  at 4/14/2022 1025    Acceptance, E, NR by CF at 4/12/2022 1349    Acceptance, E, VU by DB at 4/10/2022 1614    Acceptance, E,TB, NR by MS at 4/7/2022 1535   Family Acceptance, E,TB,D, VU,NR by  at 4/17/2022 1621    Acceptance, E, VU by DB at 4/10/2022 1614   Significant Other Acceptance, E,TB, NR by MS at 4/7/2022 1535                   Point: Home exercise program (In Progress)     Learning Progress Summary           Patient Acceptance, E,TB,D, VU,NR by  at 4/17/2022 1621    Acceptance, E, NR by  at 4/14/2022 1025    Acceptance, E, NR by CF at 4/12/2022 1349    Acceptance, E, VU by DB at 4/10/2022 1614    Acceptance, E,TB, NR by MS at 4/7/2022 1535   Family Acceptance, E,TB,D, VU,NR by  at 4/17/2022 1621    Acceptance, E, VU by DB at 4/10/2022 1614   Significant Other Acceptance, E,TB, NR by MS at 4/7/2022 1535                   Point: Body mechanics (In Progress)     Learning Progress Summary           Patient Acceptance, E, NR by LH at 4/14/2022 1025    Acceptance, E, NR by CF at 4/12/2022 1349    Acceptance, E, VU by DB  at 4/10/2022 1614    Acceptance, E,TB, NR by MS at 4/7/2022 1535   Family Acceptance, E, VU by DB at 4/10/2022 1614   Significant Other Acceptance, E,TB, NR by MS at 4/7/2022 1535                   Point: Precautions (In Progress)     Learning Progress Summary           Patient Acceptance, E, NR by  at 4/14/2022 1025    Acceptance, E, NR by CF at 4/12/2022 1349    Acceptance, E, VU by DB at 4/10/2022 1614    Acceptance, E,TB, NR by MS at 4/7/2022 1535   Family Acceptance, E, VU by DB at 4/10/2022 1614   Significant Other Acceptance, E,TB, NR by MS at 4/7/2022 1535                               User Key     Initials Effective Dates Name Provider Type Discipline     06/16/21 -  Sandi Shepard, PT Physical Therapist PT    EJ 06/16/21 -  Lorri Knox, PT Physical Therapist PT    MS 06/16/21 -  Goldie Abrams, PT Physical Therapist PT    DB 06/16/21 -  Angelica Pierson, PT Physical Therapist PT    CF 06/16/21 -  Rhoda Antunez, PT Physical Therapist PT              PT Recommendation and Plan     Plan of Care Reviewed With: patient, spouse  Progress: improving  Outcome Evaluation: Pt seen for PT this afternoon. He is agreeable w no complaints at this time. He is eager to move and get up to chair today. He still has significant L quad weakness. He was able to sit EOB w mod A. He requires mod A x 2 to come to stand. L knee elizabeth with any weight bearing and therapist blocking L Knee to provide support and assist w quad control. He took several steps to transfer to chair with therapist blocking L knee throughout. Pt would benefit from acute rehab at [place]. Discussed w RN and wife. Will continue to progress as tolerated.     Time Calculation:    PT Charges     Row Name 04/17/22 1621             Time Calculation    Start Time 1528  -EJ      Stop Time 1552  -EJ      Time Calculation (min) 24 min  -EJ      PT Received On 04/17/22  -EJ      PT - Next Appointment 04/18/22  -EJ              Timed Charges    48391 - PT  Therapeutic Exercise Minutes 5  -EJ      02366 - Gait Training Minutes  5  -EJ      92172 - PT Therapeutic Activity Minutes 14  -EJ              Total Minutes    Timed Charges Total Minutes 24  -EJ       Total Minutes 24  -EJ            User Key  (r) = Recorded By, (t) = Taken By, (c) = Cosigned By    Initials Name Provider Type    Lorri Hendricks, PT Physical Therapist              Therapy Charges for Today     Code Description Service Date Service Provider Modifiers Qty    78376241463 HC PT THER PROC EA 15 MIN 4/17/2022 Lorri Knox, PT GP 1    30670589241 HC PT THERAPEUTIC ACT EA 15 MIN 4/17/2022 Lorri Knox, PT GP 1    64395957039 HC PT THER SUPP EA 15 MIN 4/17/2022 Lorri Knox, PT GP 1          PT G-Codes  Outcome Measure Options: AM-PAC 6 Clicks Basic Mobility (PT)  AM-PAC 6 Clicks Score (PT): 12  AM-PAC 6 Clicks Score (OT): 15    Lorri Knox PT  4/17/2022

## 2022-04-17 NOTE — PLAN OF CARE
Goal Outcome Evaluation:  Plan of Care Reviewed With: patient        Progress: improving  Outcome Evaluation: Patient remains chronic afib on monitor. Had 6.4 second pause, Chery Stearns was notified. Patient remains on RA with SATs in high 90s. Will continue to monitor.

## 2022-04-17 NOTE — PLAN OF CARE
Goal Outcome Evaluation:           Progress: improving  Outcome Evaluation: vss, pt on room air, no complaints of pain, up to chair with PT, will continue to monitor

## 2022-04-17 NOTE — PLAN OF CARE
Goal Outcome Evaluation:  Plan of Care Reviewed With: patient, spouse        Progress: improving  Outcome Evaluation: Pt seen for PT this afternoon. He is agreeable w no complaints at this time. He is eager to move and get up to chair today. He still has significant L quad weakness. He was able to sit EOB w mod A. He requires mod A x 2 to come to stand. L knee elizabeth with any weight bearing and therapist blocking L Knee to provide support and assist w quad control. He took several steps to transfer to chair with therapist blocking L knee throughout. Pt would benefit from acute rehab at [place]. Discussed w RN and wife. Will continue to progress as tolerated.

## 2022-04-18 DIAGNOSIS — E11.65 UNCONTROLLED TYPE 2 DIABETES MELLITUS WITH HYPERGLYCEMIA: ICD-10-CM

## 2022-04-18 LAB
ANION GAP SERPL CALCULATED.3IONS-SCNC: 6.6 MMOL/L (ref 5–15)
BUN SERPL-MCNC: 16 MG/DL (ref 8–23)
BUN/CREAT SERPL: 13.4 (ref 7–25)
CALCIUM SPEC-SCNC: 8.4 MG/DL (ref 8.6–10.5)
CHLORIDE SERPL-SCNC: 105 MMOL/L (ref 98–107)
CO2 SERPL-SCNC: 26.4 MMOL/L (ref 22–29)
CREAT SERPL-MCNC: 1.19 MG/DL (ref 0.76–1.27)
DEPRECATED RDW RBC AUTO: 47.6 FL (ref 37–54)
EGFRCR SERPLBLD CKD-EPI 2021: 60.2 ML/MIN/1.73
ERYTHROCYTE [DISTWIDTH] IN BLOOD BY AUTOMATED COUNT: 15.7 % (ref 12.3–15.4)
GLUCOSE BLDC GLUCOMTR-MCNC: 123 MG/DL (ref 70–130)
GLUCOSE BLDC GLUCOMTR-MCNC: 128 MG/DL (ref 70–130)
GLUCOSE BLDC GLUCOMTR-MCNC: 147 MG/DL (ref 70–130)
GLUCOSE BLDC GLUCOMTR-MCNC: 199 MG/DL (ref 70–130)
GLUCOSE SERPL-MCNC: 124 MG/DL (ref 65–99)
HCT VFR BLD AUTO: 29.4 % (ref 37.5–51)
HGB BLD-MCNC: 9.2 G/DL (ref 13–17.7)
INR PPP: 1.57 (ref 0.9–1.1)
MAGNESIUM SERPL-MCNC: 2.4 MG/DL (ref 1.6–2.4)
MCH RBC QN AUTO: 26 PG (ref 26.6–33)
MCHC RBC AUTO-ENTMCNC: 31.3 G/DL (ref 31.5–35.7)
MCV RBC AUTO: 83.1 FL (ref 79–97)
PHOSPHATE SERPL-MCNC: 3 MG/DL (ref 2.5–4.5)
PLATELET # BLD AUTO: 214 10*3/MM3 (ref 140–450)
PMV BLD AUTO: 11 FL (ref 6–12)
POTASSIUM SERPL-SCNC: 4.7 MMOL/L (ref 3.5–5.2)
PROTHROMBIN TIME: 18.7 SECONDS (ref 11.7–14.2)
RBC # BLD AUTO: 3.54 10*6/MM3 (ref 4.14–5.8)
SODIUM SERPL-SCNC: 138 MMOL/L (ref 136–145)
WBC NRBC COR # BLD: 3.15 10*3/MM3 (ref 3.4–10.8)

## 2022-04-18 PROCEDURE — 82962 GLUCOSE BLOOD TEST: CPT

## 2022-04-18 PROCEDURE — 25010000002 ENOXAPARIN PER 10 MG: Performed by: HOSPITALIST

## 2022-04-18 PROCEDURE — 85610 PROTHROMBIN TIME: CPT | Performed by: NURSE PRACTITIONER

## 2022-04-18 PROCEDURE — 83735 ASSAY OF MAGNESIUM: CPT | Performed by: STUDENT IN AN ORGANIZED HEALTH CARE EDUCATION/TRAINING PROGRAM

## 2022-04-18 PROCEDURE — 84100 ASSAY OF PHOSPHORUS: CPT | Performed by: STUDENT IN AN ORGANIZED HEALTH CARE EDUCATION/TRAINING PROGRAM

## 2022-04-18 PROCEDURE — 80048 BASIC METABOLIC PNL TOTAL CA: CPT | Performed by: STUDENT IN AN ORGANIZED HEALTH CARE EDUCATION/TRAINING PROGRAM

## 2022-04-18 PROCEDURE — 99231 SBSQ HOSP IP/OBS SF/LOW 25: CPT | Performed by: INTERNAL MEDICINE

## 2022-04-18 PROCEDURE — 97530 THERAPEUTIC ACTIVITIES: CPT

## 2022-04-18 PROCEDURE — 85027 COMPLETE CBC AUTOMATED: CPT | Performed by: STUDENT IN AN ORGANIZED HEALTH CARE EDUCATION/TRAINING PROGRAM

## 2022-04-18 RX ORDER — NYSTATIN 100000 [USP'U]/G
POWDER TOPICAL EVERY 12 HOURS SCHEDULED
Status: DISCONTINUED | OUTPATIENT
Start: 2022-04-18 | End: 2022-04-20 | Stop reason: HOSPADM

## 2022-04-18 RX ADMIN — FERROUS SULFATE TAB 325 MG (65 MG ELEMENTAL FE) 325 MG: 325 (65 FE) TAB at 08:48

## 2022-04-18 RX ADMIN — MAGNESIUM OXIDE 400 MG (241.3 MG MAGNESIUM) TABLET 400 MG: TABLET at 21:01

## 2022-04-18 RX ADMIN — NYSTATIN: 100000 POWDER TOPICAL at 21:04

## 2022-04-18 RX ADMIN — MAGNESIUM OXIDE 400 MG (241.3 MG MAGNESIUM) TABLET 400 MG: TABLET at 08:48

## 2022-04-18 RX ADMIN — INSULIN GLARGINE-YFGN 15 UNITS: 100 INJECTION, SOLUTION SUBCUTANEOUS at 21:03

## 2022-04-18 RX ADMIN — ENOXAPARIN SODIUM 70 MG: 100 INJECTION SUBCUTANEOUS at 21:02

## 2022-04-18 RX ADMIN — WARFARIN 7.5 MG: 7.5 TABLET ORAL at 17:04

## 2022-04-18 RX ADMIN — METOPROLOL SUCCINATE 12.5 MG: 25 TABLET, EXTENDED RELEASE ORAL at 08:48

## 2022-04-18 RX ADMIN — Medication 10 ML: at 08:49

## 2022-04-18 RX ADMIN — DIGOXIN 62.5 MCG: 125 TABLET ORAL at 12:05

## 2022-04-18 RX ADMIN — FAMOTIDINE 20 MG: 20 TABLET ORAL at 17:04

## 2022-04-18 RX ADMIN — Medication 1 CAPSULE: at 08:48

## 2022-04-18 RX ADMIN — Medication 10 ML: at 21:04

## 2022-04-18 RX ADMIN — ENOXAPARIN SODIUM 70 MG: 100 INJECTION SUBCUTANEOUS at 08:48

## 2022-04-18 RX ADMIN — FAMOTIDINE 20 MG: 20 TABLET ORAL at 08:48

## 2022-04-18 NOTE — PROGRESS NOTES
Hospital Follow Up    LOS:  LOS: 12 days   Patient Name: Francisco Sequeira  Age/Sex: 86 y.o. male  : 1935  MRN: [id number]    Day of Service: 22   Length of Stay: 12  Encounter Provider: Griffin Fried MD  Place of Service: [facility] CARDIOLOGY  Patient Care Team:  Rubin Hinkle APRN as PCP - General (Family Medicine)  Audra Vazquez RPH as Pharmacist  Vasquez Niño PharmD as Pharmacist (Pharmacy)  Tatiana Tinoco MD as Consulting Physician (Gastroenterology)    Subjective:     Chief Complaint: Atrial fibrillation/aortic valve replacement    Interval History: Patient still weak.  He is overall doing better.  Fortunately he has not a lot of pain.    Objective:     Objective:  Temp:  [97.4 °F (36.3 °C)-97.5 °F (36.4 °C)] 97.5 °F (36.4 °C)  Heart Rate:  [66-88] 78  Resp:  [16] 16  BP: ()/(54-65) 118/65     Intake/Output Summary (Last 24 hours) at 2022 0936  Last data filed at 2022 2156  Gross per 24 hour   Intake 60 ml   Output --   Net 60 ml     Body mass index is 20.34 kg/m².      22  0047 22  1241 22  1202   Weight: 70.8 kg (156 lb) 68.5 kg (151 lb 0.2 oz) 68 kg (150 lb)     Weight change:       Physical Exam:   General : Alert, cooperative, in no acute distress.  Neuro: Alert,cooperative and oriented.  Lungs: CTAB. Normal respiratory effort and rate.  CV: Artificial click  ABD: Soft, nontender, nondistended. Positive bowel sounds.  Extr: No edema or cyanosis, moves all extremities.    Lab Review:   Results from last 7 days   Lab Units 22  0721 22  0838   SODIUM mmol/L 138 137   POTASSIUM mmol/L 4.7 4.6   CHLORIDE mmol/L 105 105   CO2 mmol/L 26.4 24.5   BUN mg/dL 16 17   CREATININE mg/dL 1.19 1.29*   GLUCOSE mg/dL 124* 157*   CALCIUM mg/dL 8.4* 8.3*         Results from last 7 days   Lab Units 22  0721 22  0838   WBC 10*3/mm3 3.15* 3.99   HEMOGLOBIN g/dL 9.2* 9.0*   HEMATOCRIT % 29.4* 29.4*   PLATELETS  10*3/mm3 214 214     Results from last 7 days   Lab Units 04/18/22  0721 04/17/22  0838   INR  1.57* 1.44*     Results from last 7 days   Lab Units 04/18/22  0721 04/17/22  0838   MAGNESIUM mg/dL 2.4 2.3           Invalid input(s): LDLCALC            Current Medications:   Scheduled Meds:atorvastatin, 40 mg, Oral, Daily  digoxin, 62.5 mcg, Oral, Daily  enoxaparin, 1 mg/kg, Subcutaneous, BID  famotidine, 20 mg, Oral, BID AC  ferrous sulfate, 325 mg, Oral, Every Other Day  insulin glargine, 15 Units, Subcutaneous, Nightly  insulin lispro, 0-9 Units, Subcutaneous, TID AC  lactobacillus acidophilus, 1 capsule, Oral, Daily  magnesium oxide, 400 mg, Oral, BID  metoprolol succinate XL, 12.5 mg, Oral, Daily  sodium chloride, 10 mL, Intravenous, Q12H  warfarin, 7.5 mg, Oral, Daily      Continuous Infusions:Pharmacy to dose warfarin,         Allergies:  Allergies   Allergen Reactions   • Other Other (See Comments)     Grass, ragweed   • Penicillins Swelling   • Percocet [Oxycodone-Acetaminophen] Nausea And Vomiting       Assessment:       Hematoma of iliopsoas muscle, left, initial encounter    Atrial fibrillation (HCC)    Hypertension    Type 2 diabetes mellitus (HCC)    H/O heart valve replacement with mechanical valve    Stage 3b chronic kidney disease (HCC)    Chronic anticoagulation    History of CVA (cerebrovascular accident)    S/P laparoscopic cholecystectomy    Anemia        Plan:   1.  Spontaneous left iliopsoas hematoma secondary to anticoagulation  2.  Mechanical aortic valve replacement  3.  Prior CVA with residual left-sided weakness (INR at 2.2)  4.  Chronic anticoagulation with goal INR of 3 - 3.5  5.  History of lower GI bleed with hematochezia in November 2021  6.  Right rectus sheath hematoma in November 2021  7.  Persistent atrial fibrillation  8.  History of renal cell carcinoma and right nephrectomy   9.  Chronic kidney disease  10.  Recurrent right pleural effusion, status post multiple  thoracenteses  11.  Multifactorial anemia  12.  Diabetes  13.  Deconditioning and weakness  14.  Multiple pauses noted on telemetry of up to 5.8 seconds.  Patient remains asymptomatic.  15.  At this point we will see patient on as-needed basis.  His INR slow to increase.  Agree with Zaira the fact he had a stroke on the lower INR at 2.2.           Griffin Fried MD  04/18/22  09:36 EDT

## 2022-04-18 NOTE — PROGRESS NOTES
[facility] Acute Rehab     Reviewed with Dr. Andrade.  Patient has been accepted for acute rehab pending insurance precertification.  Left voicemail for CCP to find out when appropriate to start precert.  Dr. Juarez indicated patient may need another couple days in his note but unclear if that is for placement process or if needs a few more days before medically ready.  Will await return call.      Return call per Paige PADILLA-start precert on Tuesday.      Thanks,   April Lico RN  Rehab Admission Nurse   [phone]

## 2022-04-18 NOTE — PROGRESS NOTES
"DAILY PROGRESS NOTE  [facility]    Patient Identification:  Name: Francisco Sequeira  Age: 86 y.o.  Sex: male  :  1935  MRN: [id number]         Primary Care Physician: Rubin Hinkle APRN    Subjective:  Interval History:His right leg is weak.   He is weak.    Objective:    Scheduled Meds:atorvastatin, 40 mg, Oral, Daily  digoxin, 62.5 mcg, Oral, Daily  enoxaparin, 1 mg/kg, Subcutaneous, BID  famotidine, 20 mg, Oral, BID AC  ferrous sulfate, 325 mg, Oral, Every Other Day  insulin glargine, 15 Units, Subcutaneous, Nightly  insulin lispro, 0-9 Units, Subcutaneous, TID AC  lactobacillus acidophilus, 1 capsule, Oral, Daily  magnesium oxide, 400 mg, Oral, BID  metoprolol succinate XL, 12.5 mg, Oral, Daily  sodium chloride, 10 mL, Intravenous, Q12H  warfarin, 7.5 mg, Oral, Daily      Continuous Infusions:Pharmacy to dose warfarin,         Vital signs in last 24 hours:  Temp:  [97.4 °F (36.3 °C)-97.5 °F (36.4 °C)] 97.5 °F (36.4 °C)  Heart Rate:  [66-88] 78  Resp:  [16] 16  BP: ()/(54-65) 118/65    Intake/Output:    Intake/Output Summary (Last 24 hours) at 2022 1247  Last data filed at 2022 2156  Gross per 24 hour   Intake 60 ml   Output --   Net 60 ml       Exam:  /65 (BP Location: Right arm, Patient Position: Lying)   Pulse 78   Temp 97.5 °F (36.4 °C) (Oral)   Resp 16   Ht 182.9 cm (72\")   Wt 68 kg (150 lb)   SpO2 100%   BMI 20.34 kg/m²     General Appearance:    Alert, cooperative, no distress   Head:    Normocephalic, without obvious abnormality, atraumatic   Eyes:       Throat:   Lips, tongue, gums normal   Neck:   Supple, symmetrical, trachea midline, no JVD   Lungs:     Clear to auscultation bilaterally, respirations unlabored   Chest Wall:    No tenderness or deformity    Heart:    Regular rate and rhythm, S1 and S2 normal, no murmur,no  Rub or gallop   Abdomen:     Soft, nontender, bowel sounds active, no masses, no organomegaly    Extremities:   Extremities " normal, atraumatic, no cyanosis or edema   Pulses:      Skin:   Skin is warm and dry,  no rashes or palpable lesions   Neurologic:   Right leg weakness      Lab Results (last 72 hours)     Procedure Component Value Units Date/Time    Digoxin Level [[id number]]  (Abnormal) Collected: 04/13/22 0652    Specimen: Blood Updated: 04/13/22 1121     Digoxin 0.50 ng/mL     POC Glucose Once [[id number]]  (Abnormal) Collected: 04/13/22 1116    Specimen: Blood Updated: 04/13/22 1119     Glucose 144 mg/dL      Comment: Meter: [id number] : [id number] Alyssafay Lawson NA       Basic Metabolic Panel [[id number]]  (Abnormal) Collected: 04/13/22 0652    Specimen: Blood Updated: 04/13/22 0750     Glucose 105 mg/dL      BUN 14 mg/dL      Creatinine 0.91 mg/dL      Sodium 136 mmol/L      Potassium 4.4 mmol/L      Chloride 105 mmol/L      CO2 24.0 mmol/L      Calcium 8.3 mg/dL      BUN/Creatinine Ratio 15.4     Anion Gap 7.0 mmol/L      eGFR 83.1 mL/min/1.73      Comment: National Kidney Foundation and American Society of Nephrology (ASN) Task Force recommended calculation based on the Chronic Kidney Disease Epidemiology Collaboration (CKD-EPI) equation refit without adjustment for race.       Narrative:      GFR Normal >60  Chronic Kidney Disease <60  Kidney Failure <15      Phosphorus [[id number]]  (Normal) Collected: 04/13/22 0652    Specimen: Blood Updated: 04/13/22 0750     Phosphorus 3.1 mg/dL     Magnesium [[id number]]  (Normal) Collected: 04/13/22 0652    Specimen: Blood Updated: 04/13/22 0750     Magnesium 2.3 mg/dL     Body Fluid Culture - Aspirate, Psoas [[id number]] Collected: 04/11/22 1305    Specimen: Aspirate from Psoas Updated: 04/13/22 0734     Body Fluid Culture No growth at 2 days     Gram Stain Few (2+) WBCs per low power field      No organisms seen    CBC (No Diff) [[id number]]  (Abnormal) Collected: 04/13/22 0652    Specimen: Blood Updated: 04/13/22 0716     WBC 4.12 10*3/mm3      RBC 3.51 10*6/mm3      Hemoglobin  9.3 g/dL      Hematocrit 29.8 %      MCV 84.9 fL      MCH 26.5 pg      MCHC 31.2 g/dL      RDW 16.3 %      RDW-SD 50.6 fl      MPV 10.7 fL      Platelets 259 10*3/mm3     POC Glucose Once [[id number]]  (Normal) Collected: 04/13/22 0639    Specimen: Blood Updated: 04/13/22 0640     Glucose 101 mg/dL      Comment: Meter: [id number] : [id number] Gamboa Damon NA       POC Glucose Once [[id number]]  (Normal) Collected: 04/13/22 0236    Specimen: Blood Updated: 04/13/22 0237     Glucose 104 mg/dL      Comment: Meter: [id number] : [id number] Jason Sweeney RN       POC Glucose Once [[id number]]  (Abnormal) Collected: 04/12/22 1950    Specimen: Blood Updated: 04/12/22 1951     Glucose 143 mg/dL      Comment: Meter: [id number] : [id number] Gamboa Damon NA       POC Glucose Once [[id number]]  (Normal) Collected: 04/12/22 1610    Specimen: Blood Updated: 04/12/22 1612     Glucose 108 mg/dL      Comment: Meter: [id number] : [id number] Abhinav ALEJO       Basic Metabolic Panel [[id number]]  (Abnormal) Collected: 04/12/22 1157    Specimen: Blood Updated: 04/12/22 1233     Glucose 159 mg/dL      BUN 16 mg/dL      Creatinine 1.19 mg/dL      Sodium 134 mmol/L      Potassium 4.6 mmol/L      Chloride 102 mmol/L      CO2 24.7 mmol/L      Calcium 8.3 mg/dL      BUN/Creatinine Ratio 13.4     Anion Gap 7.3 mmol/L      eGFR 60.2 mL/min/1.73      Comment: National Kidney Foundation and American Society of Nephrology (ASN) Task Force recommended calculation based on the Chronic Kidney Disease Epidemiology Collaboration (CKD-EPI) equation refit without adjustment for race.       Narrative:      GFR Normal >60  Chronic Kidney Disease <60  Kidney Failure <15      Phosphorus [[id number]]  (Normal) Collected: 04/12/22 1157    Specimen: Blood Updated: 04/12/22 1233     Phosphorus 3.1 mg/dL     Magnesium [[id number]]  (Normal) Collected: 04/12/22 1157    Specimen: Blood Updated: 04/12/22 1233     Magnesium 2.2 mg/dL     CBC (No Diff)  [[id number]]  (Abnormal) Collected: 04/12/22 1157    Specimen: Blood Updated: 04/12/22 1211     WBC 5.56 10*3/mm3      RBC 3.33 10*6/mm3      Hemoglobin 8.7 g/dL      Hematocrit 28.5 %      MCV 85.6 fL      MCH 26.1 pg      MCHC 30.5 g/dL      RDW 16.1 %      RDW-SD 49.5 fl      MPV 10.9 fL      Platelets 273 10*3/mm3     POC Glucose Once [[id number]]  (Abnormal) Collected: 04/12/22 1106    Specimen: Blood Updated: 04/12/22 1108     Glucose 154 mg/dL      Comment: Meter: [id number] : [id number] Abhinav Lomeli NA       POC Glucose Once [[id number]]  (Normal) Collected: 04/12/22 0621    Specimen: Blood Updated: 04/12/22 0622     Glucose 92 mg/dL      Comment: Meter: [id number] : [id number] Markos Nataia NA       POC Glucose Once [[id number]]  (Abnormal) Collected: 04/12/22 0602    Specimen: Blood Updated: 04/12/22 0604     Glucose 37 mg/dL      Comment: RN Notified R and V Meter: [id number] : [id number] Markos Nataia NA       POC Glucose Once [[id number]]  (Abnormal) Collected: 04/12/22 0601    Specimen: Blood Updated: 04/12/22 0602     Glucose 33 mg/dL      Comment: Repeat Test Meter: [id number] : [id number] Markos Nataia NA       POC Glucose Once [[id number]]  (Normal) Collected: 04/11/22 1936    Specimen: Blood Updated: 04/11/22 1938     Glucose 125 mg/dL      Comment: Meter: [id number] : [id number] Markos Nataia NA       POC Glucose Once [[id number]]  (Abnormal) Collected: 04/11/22 1610    Specimen: Blood Updated: 04/11/22 1611     Glucose 172 mg/dL      Comment: RN Notified R and V Meter: [id number] : [id number] Sen ALEJO       Basic Metabolic Panel [[id number]]  (Abnormal) Collected: 04/11/22 1123    Specimen: Blood Updated: 04/11/22 1246     Glucose 103 mg/dL      BUN 13 mg/dL      Creatinine 1.15 mg/dL      Sodium 136 mmol/L      Potassium 4.5 mmol/L      Chloride 103 mmol/L      CO2 26.0 mmol/L      Calcium 8.0 mg/dL      BUN/Creatinine Ratio 11.3     Anion Gap 7.0 mmol/L      eGFR 62.8  mL/min/1.73      Comment: National Kidney Foundation and American Society of Nephrology (ASN) Task Force recommended calculation based on the Chronic Kidney Disease Epidemiology Collaboration (CKD-EPI) equation refit without adjustment for race.       Narrative:      GFR Normal >60  Chronic Kidney Disease <60  Kidney Failure <15      Magnesium [[id number]]  (Normal) Collected: 04/11/22 1123    Specimen: Blood Updated: 04/11/22 1246     Magnesium 2.1 mg/dL     Phosphorus [[id number]]  (Normal) Collected: 04/11/22 1123    Specimen: Blood Updated: 04/11/22 1246     Phosphorus 3.3 mg/dL     CBC (No Diff) [[id number]]  (Abnormal) Collected: 04/11/22 1123    Specimen: Blood Updated: 04/11/22 1230     WBC 5.50 10*3/mm3      RBC 3.28 10*6/mm3      Hemoglobin 8.8 g/dL      Hematocrit 28.2 %      MCV 86.0 fL      MCH 26.8 pg      MCHC 31.2 g/dL      RDW 15.9 %      RDW-SD 49.4 fl      MPV 11.3 fL      Platelets 288 10*3/mm3     POC Glucose Once [[id number]]  (Normal) Collected: 04/11/22 1103    Specimen: Blood Updated: 04/11/22 1105     Glucose 93 mg/dL      Comment: Meter: [id number] : [id number] Sen Trent NA       POC Glucose Once [[id number]]  (Normal) Collected: 04/11/22 0537    Specimen: Blood Updated: 04/11/22 0538     Glucose 98 mg/dL      Comment: Meter: [id number] : [id number] Ceasar Gladys NA       POC Glucose Once [[id number]]  (Normal) Collected: 04/11/22 0241    Specimen: Blood Updated: 04/11/22 0242     Glucose 97 mg/dL      Comment: Meter: [id number] : [id number] Ceasar Gladys NA       POC Glucose Once [[id number]]  (Normal) Collected: 04/10/22 1949    Specimen: Blood Updated: 04/10/22 1950     Glucose 113 mg/dL      Comment: Meter: [id number] : [id number] Jason Sweeney RN       POC Glucose Once [[id number]]  (Abnormal) Collected: 04/10/22 1602    Specimen: Blood Updated: 04/10/22 1603     Glucose 159 mg/dL      Comment: Meter: [id number] : [id number] Leon Reyes Clausia NA Data  Review:  Results from last 7 days   Lab Units 04/18/22  0721 04/17/22  0838 04/16/22  1014   SODIUM mmol/L 138 137 135*   POTASSIUM mmol/L 4.7 4.6 4.5   CHLORIDE mmol/L 105 105 104   CO2 mmol/L 26.4 24.5 21.0*   BUN mg/dL 16 17 14   CREATININE mg/dL 1.19 1.29* 1.18   GLUCOSE mg/dL 124* 157* 175*   CALCIUM mg/dL 8.4* 8.3* 8.3*     Results from last 7 days   Lab Units 04/18/22  0721 04/17/22  0838 04/16/22  1014   WBC 10*3/mm3 3.15* 3.99 7.19   HEMOGLOBIN g/dL 9.2* 9.0* 10.0*   HEMATOCRIT % 29.4* 29.4* 32.4*   PLATELETS 10*3/mm3 214 214 259             Lab Results   Lab Value Date/Time    TROPONINT 0.030 04/06/2022 0002    TROPONINT 0.018 03/30/2022 1625    TROPONINT 0.027 02/18/2022 2305    TROPONINT 0.011 10/23/2021 1617    TROPONINT 0.017 07/15/2021 2029    TROPONINT <0.010 01/13/2020 0747    TROPONINT <0.010 03/30/2017 0809               Invalid input(s): CARLOS ALBERTO LABALBU          Glucose   Date/Time Value Ref Range Status   04/18/2022 1142 199 (H) 70 - 130 mg/dL Final     Comment:     RN Notified R and V Meter: [id number] : [id number] Sen Trent NA   04/18/2022 0611 123 70 - 130 mg/dL Final     Comment:     Meter: [id number] : [id number] Ceasar Gladys NA   04/17/2022 2035 155 (H) 70 - 130 mg/dL Final     Comment:     Meter: [id number] : [id number] Ceasar Gladys NA   04/17/2022 1559 183 (H) 70 - 130 mg/dL Final     Comment:     Meter: [id number] : [id number] Anurag Wing NA   04/17/2022 1116 158 (H) 70 - 130 mg/dL Final     Comment:     Meter: [id number] : [id number] Anurag Wing NA   04/17/2022 0627 129 70 - 130 mg/dL Final     Comment:     Meter: [id number] : [id number] Pavan Garcia NA   04/16/2022 2054 150 (H) 70 - 130 mg/dL Final     Comment:     Meter: [id number] : [id number] Kade Ngoie NA   04/16/2022 1605 148 (H) 70 - 130 mg/dL Final     Comment:     Meter: [id number] : [id number] Isaias ALEJO     Results from last 7 days   Lab Units 04/18/22  0721  04/17/22  0838 04/16/22  1014   INR  1.57* 1.44* 1.20*       Past Medical History:   Diagnosis Date   • Allergic rhinitis    • Aortic valve insufficiency    • Ascending aortic aneurysm (HCC)    • Atrial fibrillation (HCC)    • Bacteremia    • Calcific aortic stenosis of bicuspid valve    • Cardiac arrest (HCC)    • Cardiomyopathy (HCC)    • CKD (chronic kidney disease) stage 3, GFR 30-59 ml/min (HCC)    • Contact dermatitis due to poison ivy    • Elbow fracture    • Esophageal reflux    • GERD (gastroesophageal reflux disease)    • Head injury    • History of transfusion    • Hyperglycemia    • Hyperlipidemia    • Hypertension    • Kidney carcinoma (HCC)    • Nonischemic cardiomyopathy (HCC)    • Renal oncocytoma    • Seasonal allergic reaction    • Sinusitis    • Syncope    • Type 2 diabetes mellitus (HCC)     uncontrolled   • Visual field defect        Assessment:  Active Hospital Problems    Diagnosis  POA   • **Hematoma of iliopsoas muscle, left, initial encounter [S70.12XA]  Yes   • History of CVA (cerebrovascular accident) [Z86.73]  Not Applicable   • S/P laparoscopic cholecystectomy [Z90.49]  Not Applicable   • Anemia [D64.9]  Yes   • Chronic anticoagulation [Z79.01]  Not Applicable   • Stage 3b chronic kidney disease (HCC) [N18.32]  Yes   • H/O heart valve replacement with mechanical valve [Z95.2]  Not Applicable   • Type 2 diabetes mellitus (HCC) [E11.9]  Yes   • Hypertension [I10]  Yes   • Atrial fibrillation (HCC) [I48.91]  Yes      Resolved Hospital Problems   No resolved problems to display.       Plan:  Continue with current RX as per cardiology.  Continue coumadin  Will need  acute rehab for rehab. Follow lab.  DC planning.  Acute rehab soon Await PreCert..    Leobardo Juarez MD  4/18/2022  12:47 EDT

## 2022-04-18 NOTE — PROGRESS NOTES
[facility] Clinical Pharmacy Services: Warfarin Dosing/Monitoring Consult    Francisco Sequeira is a 86 y.o. male, estimated creatinine clearance is 44.4 mL/min (by C-G formula based on SCr of 1.19 mg/dL). weighing 68 kg (150 lb).    Results from last 7 days   Lab Units 04/18/22  0721 04/17/22  0838 04/16/22  1014 04/15/22  1723 04/15/22  0806 04/14/22  0848   INR  1.57* 1.44* 1.20* 1.18*  --   --    HEMOGLOBIN g/dL 9.2* 9.0* 10.0*  --  10.0* 9.3*   HEMATOCRIT % 29.4* 29.4* 32.4*  --  31.6* 30.5*   PLATELETS 10*3/mm3 214 214 259  --  273 271     Prior to admission anticoagulation: 7.5 mg PO daily on Mon, Wed, Fri; 5 mg PO daily on all other days of the week.    Hospital Anticoagulation:  Consulting provider: Iza Mckeon APRN  Start date: 4/15/22  Indication: Mechanical valve  Target INR: 2.5 - 3.5    Bridge Therapy: Yes  with enoxaparin    Potential food or drug interactions: none at this time    Education complete?/Date: No; plan for follow up TBD    Assessment/Plan:  INR is subtherapeutic at 1.57 (Goal 2.5-3.5) but is trending up nicely. I will continue the increased dose of warfarin 7.5 mg daily for now.   Monitor for any signs or symptoms of bleeding  Follow up daily INRs and dose adjustments    Pharmacy will continue to follow until discharge or discontinuation of warfarin.     Gabrielle Issa, PharmD

## 2022-04-18 NOTE — PLAN OF CARE
Goal Outcome Evaluation:  Plan of Care Reviewed With: patient        Progress: improving  Outcome Evaluation: Pt demonstrates good progress with mobility. Demonstrates improved strength and balance, as evidenced by increased independence with bed mobility, transfers, and ambulation today. Pt able to tolerate increased activity today, also. Pt continues to require max A to block L knee into extension to prevent buckling; however, was otherwise able to perform sit to stand with CGA when knee blocked. Pt very motivated for therapy and states his wife has been helping him with supine exercises throughout the day. Pt will continue to benefit from PT for ongoing strengthening and mobility training. Recommend DC to acute rehab to maximize independence with mobility and assist with return to PLOF.

## 2022-04-18 NOTE — PAYOR COMM NOTE
"Laura Xie (86 y.o. Male)     PLEASE SEE ATTACHED FOR INPT CONTINUED STAY.     REF#[id number]    PLEASE CALL   OR  [phone]    THANK YOU    NAVDEEP LIEBERMAN LPN CCP            Date of Birth   1935    Social Security Number       Address   [address] [place] [address]    Home Phone   [phone]    MRN   [id number]       Sabianism   Hinduism    Marital Status                               Admission Date   4/5/22    Admission Type   Emergency    Admitting Provider   Bowen Coughlin MD    Attending Provider   Leobardo Juarez MD    Department, Room/Bed   [address], [address]       Discharge Date       Discharge Disposition       Discharge Destination                               Attending Provider: Leobardo Juarez MD    Allergies: Other, Penicillins, Percocet [Oxycodone-acetaminophen]    Isolation: None   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)   Code Status: CPR   Advance Care Planning Activity    Ht: 182.9 cm (72\")   Wt: 68 kg (150 lb)    Admission Cmt: None   Principal Problem: Hematoma of iliopsoas muscle, left, initial encounter [S70.12XA]                 Active Insurance as of 4/5/2022     Primary Coverage     Payor Plan Insurance Group Employer/Plan Group    ANTHEM BLUE CROSS ANTHEM BLUE CROSS BLUE SHIELD PPO [id number]     Payor Plan Address Payor Plan Phone Number Payor Plan Fax Number Effective Dates    [address] [phone]  1/1/2022 - None Entered    [facility] [id number]       Subscriber Name Subscriber Birth Date Member ID       PATRICIA XIE 7/11/1955 [id number]           Secondary Coverage     Payor Plan Insurance Group Employer/Plan Group    MEDICARE MEDICARE A & B      Payor Plan Address Payor Plan Phone Number Payor Plan Fax Number Effective Dates    [address] [phone]  11/1/2002 - None Entered    [facility] [id number]       Subscriber Name Subscriber Birth Date Member ID       LAURA XIE 1935 " [id number]                 Emergency Contacts      (Rel.) Home Phone Work Phone Mobile Phone    Gladys Sequeira (Spouse) [phone] -- [phone]    MendezmillyyumikoBruce felipe (Son) [phone] -- [phone]              Oxygen Therapy (last 2 days)     Date/Time SpO2 Device (Oxygen Therapy) Flow (L/min) Oxygen Concentration (%) ETCO2 (mmHg)    04/18/22 0700 100 room air -- -- --    04/18/22 0011 -- room air -- -- --    04/18/22 0002 99 room air -- -- --    04/17/22 2156 -- room air -- -- --    04/17/22 1700 100 room air -- -- --    04/17/22 1300 100 room air -- -- --    04/17/22 0800 -- room air -- -- --    04/17/22 0700 99 room air -- -- --    04/17/22 0609 -- room air -- -- --    04/17/22 0000 -- room air -- -- --    04/16/22 2229 100 room air -- -- --    04/16/22 2000 -- room air -- -- --    04/16/22 1926 99 room air -- -- --    04/16/22 1329 95 room air -- -- --    04/16/22 0820 100 room air -- -- --    04/16/22 0800 -- room air -- -- --    04/16/22 0600 -- room air -- -- --    04/16/22 0049 -- room air -- -- --        Intake & Output (last 2 days)       04/16 0701  04/17 0700 04/17 0701  04/18 0700 04/18 0701  04/19 0700    P.O. 210 60     Total Intake(mL/kg) 210 (3.1) 60 (0.9)     Urine (mL/kg/hr) 500 (0.3)      Stool 0      Total Output 500      Net -290 +60            Urine Unmeasured Occurrence 4 x 3 x     Stool Unmeasured Occurrence 2 x          Lines, Drains & Airways     Active LDAs     Name Placement date Placement time Site Days    Peripheral IV 04/09/22 1620 Left Hand 04/09/22  1620  Hand  8          Inactive LDAs     Name Placement date Placement time Removal date Removal time Site Days    [REMOVED] Peripheral IV 03/23/22 0831 Right Antecubital 03/23/22  0831  04/04/22  1059  Antecubital  12    [REMOVED] Peripheral IV 03/27/22 0830 Posterior;Right Forearm 03/27/22  0830  04/04/22  1059  Forearm  8    [REMOVED] Peripheral IV 03/29/22 1930 Left;Posterior Hand 03/29/22  1930  04/04/22   "1059  Hand  5    [REMOVED] Peripheral IV 22 2305 Left Hand 22  2305  22  1500  Hand  3    [REMOVED] External Urinary Catheter 22  0230  22  1137  --  less than 1                       Physician Progress Notes (last 48 hours)      Leobardo Juarez MD at 22 1721          DAILY PROGRESS NOTE  [facility]    Patient Identification:  Name: Francisco Sequeira  Age: 86 y.o.  Sex: male  :  1935  MRN: [id number]         Primary Care Physician: Rubin Hinkle APRN    Subjective:  Interval History:His right leg is weak.   He is weak.    Objective:    Scheduled Meds:atorvastatin, 40 mg, Oral, Daily  digoxin, 62.5 mcg, Oral, Daily  enoxaparin, 1 mg/kg, Subcutaneous, BID  famotidine, 20 mg, Oral, BID AC  ferrous sulfate, 325 mg, Oral, Every Other Day  insulin glargine, 15 Units, Subcutaneous, Nightly  insulin lispro, 0-9 Units, Subcutaneous, TID AC  lactobacillus acidophilus, 1 capsule, Oral, Daily  magnesium oxide, 400 mg, Oral, BID  metoprolol succinate XL, 12.5 mg, Oral, Daily  sodium chloride, 10 mL, Intravenous, Q12H  warfarin, 7.5 mg, Oral, Daily      Continuous Infusions:Pharmacy to dose warfarin,         Vital signs in last 24 hours:  Temp:  [97.5 °F (36.4 °C)-98.2 °F (36.8 °C)] 97.5 °F (36.4 °C)  Heart Rate:  [66-84] 66  Resp:  [16] 16  BP: (101-114)/(51-59) 101/54    Intake/Output:    Intake/Output Summary (Last 24 hours) at 2022 1721  Last data filed at 2022 0500  Gross per 24 hour   Intake 210 ml   Output 500 ml   Net -290 ml       Exam:  /54 (BP Location: Right arm, Patient Position: Lying)   Pulse 66   Temp 97.5 °F (36.4 °C) (Oral)   Resp 16   Ht 182.9 cm (72\")   Wt 68 kg (150 lb)   SpO2 100%   BMI 20.34 kg/m²     General Appearance:    Alert, cooperative, no distress   Head:    Normocephalic, without obvious abnormality, atraumatic   Eyes:       Throat:   Lips, tongue, gums normal   Neck:   Supple, symmetrical, trachea midline, no JVD "   Lungs:     Clear to auscultation bilaterally, respirations unlabored   Chest Wall:    No tenderness or deformity    Heart:    Regular rate and rhythm, S1 and S2 normal, no murmur,no  Rub or gallop   Abdomen:     Soft, nontender, bowel sounds active, no masses, no organomegaly    Extremities:   Extremities normal, atraumatic, no cyanosis or edema   Pulses:      Skin:   Skin is warm and dry,  no rashes or palpable lesions   Neurologic:   Right leg weakness      Lab Results (last 72 hours)     Procedure Component Value Units Date/Time    Digoxin Level [[id number]]  (Abnormal) Collected: 04/13/22 0652    Specimen: Blood Updated: 04/13/22 1121     Digoxin 0.50 ng/mL     POC Glucose Once [[id number]]  (Abnormal) Collected: 04/13/22 1116    Specimen: Blood Updated: 04/13/22 1119     Glucose 144 mg/dL      Comment: Meter: [id number] : [id number] Alyssa ALEJO       Basic Metabolic Panel [[id number]]  (Abnormal) Collected: 04/13/22 0652    Specimen: Blood Updated: 04/13/22 0750     Glucose 105 mg/dL      BUN 14 mg/dL      Creatinine 0.91 mg/dL      Sodium 136 mmol/L      Potassium 4.4 mmol/L      Chloride 105 mmol/L      CO2 24.0 mmol/L      Calcium 8.3 mg/dL      BUN/Creatinine Ratio 15.4     Anion Gap 7.0 mmol/L      eGFR 83.1 mL/min/1.73      Comment: National Kidney Foundation and American Society of Nephrology (ASN) Task Force recommended calculation based on the Chronic Kidney Disease Epidemiology Collaboration (CKD-EPI) equation refit without adjustment for race.       Narrative:      Phosphorus [[id number]]  (Normal) Collected: 04/13/22 0652    Specimen: Blood Updated: 04/13/22 0750     Phosphorus 3.1 mg/dL     Magnesium [[id number]]  (Normal) Collected: 04/13/22 0652    Specimen: Blood Updated: 04/13/22 0750     Magnesium 2.3 mg/dL     Body Fluid Culture - Aspirate, Psoas [[id number]] Collected: 04/11/22 1306    Specimen: Aspirate from Psoas Updated: 04/13/22 0734     Body Fluid Culture No growth at 2 days      Gram Stain Few (2+) WBCs per low power field      No organisms seen    CBC (No Diff) [[id number]]  (Abnormal) Collected: 04/13/22 0652    Specimen: Blood Updated: 04/13/22 0716     WBC 4.12 10*3/mm3      RBC 3.51 10*6/mm3      Hemoglobin 9.3 g/dL      Hematocrit 29.8 %      MCV 84.9 fL      MCH 26.5 pg      MCHC 31.2 g/dL      RDW 16.3 %      RDW-SD 50.6 fl      MPV 10.7 fL      Platelets 259 10*3/mm3     POC Glucose Once [[id number]]  (Normal) Collected: 04/13/22 0639    Specimen: Blood Updated: 04/13/22 0640     Glucose 101 mg/dL      Comment: Meter: [id number] : [id number] Bee Damon NA       POC Glucose Once [[id number]]  (Normal) Collected: 04/13/22 0236    Specimen: Blood Updated: 04/13/22 0237     Glucose 104 mg/dL      Comment: Meter: [id number] : [id number] Jason Sweeney RN       POC Glucose Once [[id number]]  (Abnormal) Collected: 04/12/22 1950    Specimen: Blood Updated: 04/12/22 1951     Glucose 143 mg/dL      Comment: Meter: [id number] : [id number] Bee Damon NA       POC Glucose Once [[id number]]  (Normal) Collected: 04/12/22 1610    Specimen: Blood Updated: 04/12/22 1612     Glucose 108 mg/dL      Comment: Meter: [id number] : [id number] Abhinav ALEJO       Basic Metabolic Panel [[id number]]  (Abnormal) Collected: 04/12/22 1157    Specimen: Blood Updated: 04/12/22 1233     Glucose 159 mg/dL      BUN 16 mg/dL      Creatinine 1.19 mg/dL      Sodium 134 mmol/L      Potassium 4.6 mmol/L      Chloride 102 mmol/L      CO2 24.7 mmol/L      Calcium 8.3 mg/dL      BUN/Creatinine Ratio 13.4     Anion Gap 7.3 mmol/L      eGFR 60.2 mL/min/1.73      Comment: National Kidney Foundation and American Society of Nephrology (ASN) Task Force recommended calculation based on the Chronic Kidney Disease Epidemiology Collaboration (CKD-EPI) equation refit without adjustment for race.       Narrative:      Phosphorus [[id number]]  (Normal) Collected: 04/12/22 1157    Specimen: Blood Updated: 04/12/22 1233      Phosphorus 3.1 mg/dL     Magnesium [[id number]]  (Normal) Collected: 04/12/22 1157    Specimen: Blood Updated: 04/12/22 1233     Magnesium 2.2 mg/dL     CBC (No Diff) [[id number]]  (Abnormal) Collected: 04/12/22 1157    Specimen: Blood Updated: 04/12/22 1211     WBC 5.56 10*3/mm3      RBC 3.33 10*6/mm3      Hemoglobin 8.7 g/dL      Hematocrit 28.5 %      MCV 85.6 fL      MCH 26.1 pg      MCHC 30.5 g/dL      RDW 16.1 %      RDW-SD 49.5 fl      MPV 10.9 fL      Platelets 273 10*3/mm3     POC Glucose Once [[id number]]  (Abnormal) Collected: 04/12/22 1106    Specimen: Blood Updated: 04/12/22 1108     Glucose 154 mg/dL      Comment: Meter: [id number] : [id number] Abihnav Lomeli NA       POC Glucose Once [[id number]]  (Normal) Collected: 04/12/22 0621    Specimen: Blood Updated: 04/12/22 0622     Glucose 92 mg/dL      Comment: Meter: [id number] : [id number] Markos Nataia NA       POC Glucose Once [[id number]]  (Abnormal) Collected: 04/12/22 0602    Specimen: Blood Updated: 04/12/22 0604     Glucose 37 mg/dL      Comment: RN Notified R and V Meter: [id number] : [id number] Markos Nataia NA       POC Glucose Once [[id number]]  (Abnormal) Collected: 04/12/22 0601    Specimen: Blood Updated: 04/12/22 0602     Glucose 33 mg/dL      Comment: Repeat Test Meter: [id number] : [id number] Markos Nataia NA       POC Glucose Once [[id number]]  (Normal) Collected: 04/11/22 1936    Specimen: Blood Updated: 04/11/22 1938     Glucose 125 mg/dL      Comment: Meter: [id number] : [id number] Markos Nataia NA       POC Glucose Once [[id number]]  (Abnormal) Collected: 04/11/22 1610    Specimen: Blood Updated: 04/11/22 1611     Glucose 172 mg/dL      Comment: RN Notified R and V Meter: [id number] : [id number] Sen ALEJO       Basic Metabolic Panel [[id number]]  (Abnormal) Collected: 04/11/22 1123    Specimen: Blood Updated: 04/11/22 1246     Glucose 103 mg/dL      BUN 13 mg/dL      Creatinine 1.15 mg/dL      Sodium 136 mmol/L       Potassium 4.5 mmol/L      Chloride 103 mmol/L      CO2 26.0 mmol/L      Calcium 8.0 mg/dL      BUN/Creatinine Ratio 11.3     Anion Gap 7.0 mmol/L      eGFR 62.8 mL/min/1.73      Comment: National Kidney Foundation and American Society of Nephrology (ASN) Task Force recommended calculation based on the Chronic Kidney Disease Epidemiology Collaboration (CKD-EPI) equation refit without adjustment for race.       Narrative:      Magnesium [[id number]]  (Normal) Collected: 04/11/22 1123    Specimen: Blood Updated: 04/11/22 1246     Magnesium 2.1 mg/dL     Phosphorus [[id number]]  (Normal) Collected: 04/11/22 1123    Specimen: Blood Updated: 04/11/22 1246     Phosphorus 3.3 mg/dL     CBC (No Diff) [[id number]]  (Abnormal) Collected: 04/11/22 1123    Specimen: Blood Updated: 04/11/22 1230     WBC 5.50 10*3/mm3      RBC 3.28 10*6/mm3      Hemoglobin 8.8 g/dL      Hematocrit 28.2 %      MCV 86.0 fL      MCH 26.8 pg      MCHC 31.2 g/dL      RDW 15.9 %      RDW-SD 49.4 fl      MPV 11.3 fL      Platelets 288 10*3/mm3     POC Glucose Once [[id number]]  (Normal) Collected: 04/11/22 1103    Specimen: Blood Updated: 04/11/22 1105     Glucose 93 mg/dL      Comment: Meter: [id number] : [id number] Sen Trent NA       POC Glucose Once [[id number]]  (Normal) Collected: 04/11/22 0537    Specimen: Blood Updated: 04/11/22 0538     Glucose 98 mg/dL      Comment: Meter: [id number] : [id number] Ceasar Gladys NA       POC Glucose Once [[id number]]  (Normal) Collected: 04/11/22 0241    Specimen: Blood Updated: 04/11/22 0242     Glucose 97 mg/dL      Comment: Meter: [id number] : [id number] Ceasar Gladys NA       POC Glucose Once [[id number]]  (Normal) Collected: 04/10/22 1949    Specimen: Blood Updated: 04/10/22 1950     Glucose 113 mg/dL      Comment: Meter: [id number] : [id number] Jason Sweeney RN       POC Glucose Once [[id number]]  (Abnormal) Collected: 04/10/22 1602    Specimen: Blood Updated: 04/10/22 1603     Glucose  159 mg/dL      Comment: Meter: [id number] : [id number] Leon Reyes Clausia MELO           Data Review:  Results from last 7 days   Lab Units 04/17/22  0838 04/16/22  1014 04/15/22  0806   SODIUM mmol/L 137 135* 137   POTASSIUM mmol/L 4.6 4.5 4.0   CHLORIDE mmol/L 105 104 104   CO2 mmol/L 24.5 21.0* 26.5   BUN mg/dL 17 14 12   CREATININE mg/dL 1.29* 1.18 1.09   GLUCOSE mg/dL 157* 175* 103*   CALCIUM mg/dL 8.3* 8.3* 8.8     Results from last 7 days   Lab Units 04/17/22  0838 04/16/22  1014 04/15/22  0806   WBC 10*3/mm3 3.99 7.19 4.22   HEMOGLOBIN g/dL 9.0* 10.0* 10.0*   HEMATOCRIT % 29.4* 32.4* 31.6*   PLATELETS 10*3/mm3 214 259 273             Lab Results   Lab Value Date/Time    TROPONINT 0.030 04/06/2022 0002    TROPONINT 0.018 03/30/2022 1625    TROPONINT 0.027 02/18/2022 2305    TROPONINT 0.011 10/23/2021 1617    TROPONINT 0.017 07/15/2021 2029    TROPONINT <0.010 01/13/2020 0747    TROPONINT <0.010 03/30/2017 0809               Invalid input(s): PROT, LABALBU          Glucose   Date/Time Value Ref Range Status   04/17/2022 1559 183 (H) 70 - 130 mg/dL Final     Comment:     Meter: [id number] : [id number] Anurag Baughtyn NA   04/17/2022 1116 158 (H) 70 - 130 mg/dL Final     Comment:     Meter: [id number] : [id number] Anurag Baughtyn NA   04/17/2022 0627 129 70 - 130 mg/dL Final     Comment:     Meter: [id number] : [id number] Pavan Garcia NA   04/16/2022 2054 150 (H) 70 - 130 mg/dL Final     Comment:     Meter: [id number] : [id number] Kade Amezcua NA   04/16/2022 1605 148 (H) 70 - 130 mg/dL Final     Comment:     Meter: [id number] : [id number] Isaias GONZALEZ NA   04/16/2022 1137 177 (H) 70 - 130 mg/dL Final     Comment:     Meter: [id number] : [id number] Debbi Collier NA   04/16/2022 0604 108 70 - 130 mg/dL Final     Comment:     Meter: [id number] : [id number] Dave GERMAIN   04/15/2022 2058 152 (H) 70 - 130 mg/dL Final     Comment:     Meter: [id number] : [id number]  Dave Najera MARCELLUS     Results from last 7 days   Lab Units 04/17/22  0838 04/16/22  1014 04/15/22  1723   INR  1.44* 1.20* 1.18*       Past Medical History:   Diagnosis Date   • Allergic rhinitis    • Aortic valve insufficiency    • Ascending aortic aneurysm (HCC)    • Atrial fibrillation (HCC)    • Bacteremia    • Calcific aortic stenosis of bicuspid valve    • Cardiac arrest (HCC)    • Cardiomyopathy (HCC)    • CKD (chronic kidney disease) stage 3, GFR 30-59 ml/min (HCC)    • Contact dermatitis due to poison ivy    • Elbow fracture    • Esophageal reflux    • GERD (gastroesophageal reflux disease)    • Head injury    • History of transfusion    • Hyperglycemia    • Hyperlipidemia    • Hypertension    • Kidney carcinoma (HCC)    • Nonischemic cardiomyopathy (HCC)    • Renal oncocytoma    • Seasonal allergic reaction    • Sinusitis    • Syncope    • Type 2 diabetes mellitus (HCC)     uncontrolled   • Visual field defect        Assessment:  Active Hospital Problems    Diagnosis  POA   • **Hematoma of iliopsoas muscle, left, initial encounter [S70.12XA]  Yes   • History of CVA (cerebrovascular accident) [Z86.73]  Not Applicable   • S/P laparoscopic cholecystectomy [Z90.49]  Not Applicable   • Anemia [D64.9]  Yes   • Chronic anticoagulation [Z79.01]  Not Applicable   • Stage 3b chronic kidney disease (HCC) [N18.32]  Yes   • H/O heart valve replacement with mechanical valve [Z95.2]  Not Applicable   • Type 2 diabetes mellitus (HCC) [E11.9]  Yes   • Hypertension [I10]  Yes   • Atrial fibrillation (HCC) [I48.91]  Yes      Resolved Hospital Problems   No resolved problems to display.       Plan:  Continue with current RX as per cardiology.  Continue coumadin  Will need SNU VS acute rehab for rehab. Follow lab.  DC planning. Probably needs a few more days.  Working on placement.    Leobardo Juarez MD  4/17/2022  17:21 EDT    Electronically signed by Leobardo Juarez MD at 04/17/22 2182     Leobardo Juarez MD at 04/16/22 8063   "        DAILY PROGRESS NOTE  [facility]    Patient Identification:  Name: Francisco Sequeira  Age: 86 y.o.  Sex: male  :  1935  MRN: [id number]         Primary Care Physician: Rubin Hinkle APRN    Subjective:  Interval History:His right leg is weak.    Objective:    Scheduled Meds:atorvastatin, 40 mg, Oral, Daily  digoxin, 62.5 mcg, Oral, Daily  enoxaparin, 1 mg/kg, Subcutaneous, BID  famotidine, 20 mg, Oral, BID AC  ferrous sulfate, 325 mg, Oral, Every Other Day  insulin glargine, 15 Units, Subcutaneous, Nightly  insulin lispro, 0-9 Units, Subcutaneous, TID AC  lactobacillus acidophilus, 1 capsule, Oral, Daily  magnesium oxide, 400 mg, Oral, BID  metoprolol succinate XL, 12.5 mg, Oral, Daily  sodium chloride, 10 mL, Intravenous, Q12H  warfarin, 7.5 mg, Oral, Daily      Continuous Infusions:Pharmacy to dose warfarin,         Vital signs in last 24 hours:  Temp:  [97.2 °F (36.2 °C)-98.1 °F (36.7 °C)] 97.2 °F (36.2 °C)  Heart Rate:  [] 100  Resp:  [18-20] 20  BP: (102-124)/(69-72) 119/71    Intake/Output:  No intake or output data in the 24 hours ending 22 1515    Exam:  /71 (BP Location: Right arm, Patient Position: Lying)   Pulse 100   Temp 97.2 °F (36.2 °C) (Oral)   Resp 20   Ht 182.9 cm (72\")   Wt 68 kg (150 lb)   SpO2 95%   BMI 20.34 kg/m²     General Appearance:    Alert, cooperative, no distress   Head:    Normocephalic, without obvious abnormality, atraumatic   Eyes:       Throat:   Lips, tongue, gums normal   Neck:   Supple, symmetrical, trachea midline, no JVD   Lungs:     Clear to auscultation bilaterally, respirations unlabored   Chest Wall:    No tenderness or deformity    Heart:    Regular rate and rhythm, S1 and S2 normal, no murmur,no  Rub or gallop   Abdomen:     Soft, nontender, bowel sounds active, no masses, no organomegaly    Extremities:   Extremities normal, atraumatic, no cyanosis or edema   Pulses:      Skin:   Skin is warm and dry,  no rashes " or palpable lesions   Neurologic:   Right leg weakness        Past Medical History:   Diagnosis Date   • Allergic rhinitis    • Aortic valve insufficiency    • Ascending aortic aneurysm (HCC)    • Atrial fibrillation (HCC)    • Bacteremia    • Calcific aortic stenosis of bicuspid valve    • Cardiac arrest (HCC)    • Cardiomyopathy (HCC)    • CKD (chronic kidney disease) stage 3, GFR 30-59 ml/min (HCC)    • Contact dermatitis due to poison ivy    • Elbow fracture    • Esophageal reflux    • GERD (gastroesophageal reflux disease)    • Head injury    • History of transfusion    • Hyperglycemia    • Hyperlipidemia    • Hypertension    • Kidney carcinoma (HCC)    • Nonischemic cardiomyopathy (HCC)    • Renal oncocytoma    • Seasonal allergic reaction    • Sinusitis    • Syncope    • Type 2 diabetes mellitus (HCC)     uncontrolled   • Visual field defect        Assessment:  Active Hospital Problems    Diagnosis  POA   • **Hematoma of iliopsoas muscle, left, initial encounter [S70.12XA]  Yes   • History of CVA (cerebrovascular accident) [Z86.73]  Not Applicable   • S/P laparoscopic cholecystectomy [Z90.49]  Not Applicable   • Anemia [D64.9]  Yes   • Chronic anticoagulation [Z79.01]  Not Applicable   • Stage 3b chronic kidney disease (HCC) [N18.32]  Yes   • H/O heart valve replacement with mechanical valve [Z95.2]  Not Applicable   • Type 2 diabetes mellitus (HCC) [E11.9]  Yes   • Hypertension [I10]  Yes   • Atrial fibrillation (HCC) [I48.91]  Yes      Resolved Hospital Problems   No resolved problems to display.       Plan:  Continue with current RX as per cardiology. Restart coumadin  Will need SNU VS acute rehab for rehab. Follow lab.  DC planning. Probably needs a few more days    Leobardo Juarez MD  4/16/2022  15:15 EDT    Electronically signed by Leobardo Juarez MD at 04/16/22 8706

## 2022-04-18 NOTE — CASE MANAGEMENT/SOCIAL WORK
Continued Stay Note  [facility]     Patient Name: Francisco Sequeria  MRN: [id number]  Today's Date: 4/18/2022    Admit Date: 4/5/2022     Discharge Plan     Row Name 04/18/22 1229       Plan    Plan [facility] pending [place] pre-cert (to be initiated 4/19)    Plan Comments Incoming call from [facility] who stated they can accept and will start pre-cert tomorrow. Per MD, patient will be ready in a few more days. SHEY, JASON               Discharge Codes    No documentation.               Expected Discharge Date and Time     Expected Discharge Date Expected Discharge Time    Apr 18, 2022             JASON RASHID     4

## 2022-04-18 NOTE — THERAPY PROGRESS REPORT/RE-CERT
Patient Name: Francisco Sequeira  : 1935    MRN: [id number]                              Today's Date: 2022       Admit Date: 2022    Visit Dx:     ICD-10-CM ICD-9-CM   1. Hematoma of iliopsoas muscle, left, initial encounter  S70.12XA 924.00   2. Chronic anticoagulation  Z79.01 V58.61   3. H/O heart valve replacement with mechanical valve  Z95.2 V43.3   4. Immobility syndrome  M62.3 728.3   5. Anemia, unspecified type  D64.9 285.9   6. 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)   • Iron deficiency anemia   • Chronic anticoagulation   • Hemiparesis of left nondominant side as late effect of cerebral infarction (HCC)   • Rectus sheath hematoma   • Sepsis (HCC)   • Calculus of gallbladder with acute cholecystitis without obstruction   • History of Clostridium difficile infection   • Aspiration pneumonitis (HCC)   • Hematoma of iliopsoas muscle, left, initial encounter   • History of CVA (cerebrovascular accident)   • S/P laparoscopic cholecystectomy   • Anemia     Past Medical History:   Diagnosis Date   • Allergic rhinitis    • Aortic valve insufficiency    • Ascending aortic aneurysm (HCC)    • Atrial fibrillation (HCC)    • Bacteremia    • Calcific aortic stenosis of bicuspid valve    • Cardiac arrest (HCC)    • Cardiomyopathy (HCC)    • CKD (chronic kidney disease) stage 3, GFR 30-59 ml/min (HCC)    • Contact dermatitis due to poison ivy    • Elbow fracture    • Esophageal reflux    • GERD (gastroesophageal reflux disease)    • Head  injury    • History of transfusion    • Hyperglycemia    • Hyperlipidemia    • Hypertension    • Kidney carcinoma (HCC)    • Nonischemic cardiomyopathy (HCC)    • Renal oncocytoma    • Seasonal allergic reaction    • Sinusitis    • Syncope    • Type 2 diabetes mellitus (HCC)     uncontrolled   • Visual field defect      Past Surgical History:   Procedure Laterality Date   • AORTIC VALVE REPAIR/REPLACEMENT     • ASCENDING AORTIC ANEURYSM REPAIR W/ MECHANICAL AORTIC VALVE REPLACEMENT     • CHOLECYSTECTOMY WITH INTRAOPERATIVE CHOLANGIOGRAM N/A 3/29/2022    Procedure: Laparoscopic cholecystectomy with cholangiogram, possible open;  Surgeon: Lisa Guidry MD;  Location: [facility] MAIN OR;  Service: General;  Laterality: N/A;   • COLONOSCOPY N/A 10/28/2021    Procedure: COLONOSCOPY to cecum:  cold snare polyps,;  Surgeon: Dinesh Meza MD;  Location: [facility] ENDOSCOPY;  Service: Gastroenterology;  Laterality: N/A;  pre:  Iron deficiency anemia  post:  polyps, diverticulosis,    • COLONOSCOPY N/A 11/9/2021    Procedure: COLONOSCOPY to cecum with APC cautery of AVM and clip placement x1;  Surgeon: Pavan Rodriguez MD;  Location: [facility] ENDOSCOPY;  Service: Gastroenterology;  Laterality: N/A;  PRE - gi bleed, anemia  POST - diverticulosis, poor prep, AVM right colon   • ENDOSCOPY N/A 10/28/2021    Procedure: ESOPHAGOGASTRODUODENOSCOPY with biopsies;  Surgeon: Dinesh Meza MD;  Location: [facility] ENDOSCOPY;  Service: Gastroenterology;  Laterality: N/A;  pre:  Iron deficiency anemia  post:  duodenitis and gastritis   • EYE SURGERY  12/09/2020    cataract surgery    • NEPHRECTOMY     • OTHER SURGICAL HISTORY      elbow surgery   • OTHER SURGICAL HISTORY      right arm surgery   • PROSTATE SURGERY     • THORACENTESIS Left     diagnostic      General Information     Row Name 04/18/22 1544          Physical Therapy Time and Intention    Document Type therapy note (daily note)  -EE     Mode of Treatment physical  therapy;individual therapy  -EE     Row Name 04/18/22 1544          General Information    Existing Precautions/Restrictions fall  -EE     Barriers to Rehab impaired sensation  L LE  -EE     Row Name 04/18/22 1544          Cognition    Orientation Status (Cognition) oriented x 3  -EE     Row Name 04/18/22 1544          Safety Issues, Functional Mobility    Impairments Affecting Function (Mobility) balance;endurance/activity tolerance;postural/trunk control;sensation/sensory awareness;strength  -EE           User Key  (r) = Recorded By, (t) = Taken By, (c) = Cosigned By    Initials Name Provider Type    EE Sanjuanita Soto, PT Physical Therapist               Mobility     Row Name 04/18/22 1545          Bed Mobility    Bed Mobility supine-sit  -EE     Supine-Sit [facility] (Bed Mobility) minimum assist (75% patient effort);verbal cues  -EE     Assistive Device (Bed Mobility) bed rails;head of bed elevated  -EE     Comment, (Bed Mobility) min A for LLE when moving supine to sit  -EE     Row Name 04/18/22 1545          Transfers    Comment, (Transfers) 5x STS from EOB with cues for hand placement and sequencing. Therapist manually blocking L knee to prevent buckling; otherwise, pt able to stand with CGA.  -EE     Row Name 04/18/22 1545          Bed-Chair Transfer    Bed-Chair [facility] (Transfers) minimum assist (75% patient effort);2 person assist;moderate assist (50% patient effort)  -EE     Assistive Device (Bed-Chair Transfers) walker, front-wheeled  -EE     Comment, (Bed-Chair Transfer) Therapist providing maximal manual assist to block L knee during stance. Cues for gait sequencing when pivoting to chair with walker. Second person providing min/mod A at gait belt for balance.  -EE     Row Name 04/18/22 1545          Sit-Stand Transfer    Sit-Stand [facility] (Transfers) contact guard;minimum assist (75% patient effort);2 person assist;other (see comments)  -EE     Assistive Device (Sit-Stand Transfers) walker,  front-wheeled  -EE     Comment, (Sit-Stand Transfer) Therapist providing max A manually at L knee to block during sit to stand; otherwise, no assist required and pt completed with CGA.  -EE     Row Name 04/18/22 1545          Gait/Stairs (Locomotion)    [place] Level (Gait) minimum assist (75% patient effort);moderate assist (50% patient effort);2 person assist  -EE     Assistive Device (Gait) walker, front-wheeled  -EE     Distance in Feet (Gait) 3' bed to chair  -EE     Deviations/Abnormal Patterns (Gait) raul decreased;left sided deviations;stride length decreased  -EE     Bilateral Gait Deviations forward flexed posture  -EE     Left Sided Gait Deviations knee buckling, left side;heel strike decreased;weight shift ability decreased  -EE     Comment, (Gait/Stairs) Therapist maximally blocking L knee into extension to prevent buckling. Vc's for gait sequencing with rwx.  -EE           User Key  (r) = Recorded By, (t) = Taken By, (c) = Cosigned By    Initials Name Provider Type    EE Sanjuanita Soto, CICI Physical Therapist               Obj/Interventions     Row Name 04/18/22 1546          Motor Skills    Therapeutic Exercise --  Supine B ankle pumps, heel slides x 10 reps each. Supine R SLR x 10 reps. Seated R LAQ x 10 reps. Therapist performed seated passive L knee extension x 10 reps  -EE     Row Name 04/18/22 1548          Balance    Balance Assessment sitting static balance;standing static balance;standing dynamic balance  -EE     Static Sitting Balance supervision  -EE     Position, Sitting Balance unsupported;sitting edge of bed  -EE     Static Standing Balance contact guard;2-person assist  -EE     Dynamic Standing Balance minimal assist;2-person assist  -EE     Position/Device Used, Standing Balance supported;walker, rolling  -EE     Comment, Balance Max manual assist to block L knee into extension during all standing. Performed lateral weight shifting x 20 reps with B UE support on rwx, CGA at gait  belt, and max A to block L knee.  -EE           User Key  (r) = Recorded By, (t) = Taken By, (c) = Cosigned By    Initials Name Provider Type    EE Sanjuanita Soto PT Physical Therapist               Goals/Plan     Row Name 04/18/22 1553          Bed Mobility Goal 1 (PT)    Activity/Assistive Device (Bed Mobility Goal 1, PT) bed mobility activities, all  -EE     [place] Level/Cues Needed (Bed Mobility Goal 1, PT) supervision required  -EE     Time Frame (Bed Mobility Goal 1, PT) 1 week  -EE     Progress/Outcomes (Bed Mobility Goal 1, PT) goal ongoing;continuing progress toward goal  -EE     Row Name 04/18/22 1553          Transfer Goal 1 (PT)    Activity/Assistive Device (Transfer Goal 1, PT) transfers, all;walker, rolling  -EE     [place] Level/Cues Needed (Transfer Goal 1, PT) supervision required  -EE     Time Frame (Transfer Goal 1, PT) 1 week  -EE     Progress/Outcome (Transfer Goal 1, PT) goal ongoing;continuing progress toward goal  -EE     Row Name 04/18/22 1553          Gait Training Goal 1 (PT)    Activity/Assistive Device (Gait Training Goal 1, PT) gait (walking locomotion)  -EE     [place] Level (Gait Training Goal 1, PT) minimum assist (75% or more patient effort)  -EE     Distance (Gait Training Goal 1, PT) 25  -EE     Time Frame (Gait Training Goal 1, PT) 1 week  -EE     Progress/Outcome (Gait Training Goal 1, PT) goal revised this date;progress slower than expected  -EE           User Key  (r) = Recorded By, (t) = Taken By, (c) = Cosigned By    Initials Name Provider Type    Sanjuanita Zuñiga PT Physical Therapist               Clinical Impression     Row Name 04/18/22 1550          Pain    Pretreatment Pain Rating 0/10 - no pain  -EE     Posttreatment Pain Rating 0/10 - no pain  -EE     Row Name 04/18/22 1550          Plan of Care Review    Plan of Care Reviewed With patient  -EE     Progress improving  -EE     Outcome Evaluation Pt demonstrates good progress with mobility. Demonstrates  improved strength and balance, as evidenced by increased independence with bed mobility, transfers, and ambulation today. Pt able to tolerate increased activity today, also. Pt continues to require max A to block L knee into extension to prevent buckling; however, was otherwise able to perform sit to stand with CGA when knee blocked. Pt very motivated for therapy and states his wife has been helping him with supine exercises throughout the day. Pt will continue to benefit from PT for ongoing strengthening and mobility training. Recommend DC to acute rehab to maximize independence with mobility and assist with return to PLOF.  -EE     Row Name 04/18/22 8564          Positioning and Restraints    Pre-Treatment Position in bed  -EE     Post Treatment Position chair  -EE     In Chair reclined;call light within reach;encouraged to call for assist;exit alarm on;legs elevated;with family/caregiver;notified nsg  exit alarm strip in chair; no alarm box in room  -EE           User Key  (r) = Recorded By, (t) = Taken By, (c) = Cosigned By    Initials Name Provider Type    Sanjuanita Zuñiga PT Physical Therapist               Outcome Measures     Row Name 04/18/22 9142          How much help from another person do you currently need...    Turning from your back to your side while in flat bed without using bedrails? 3  -EE     Moving from lying on back to sitting on the side of a flat bed without bedrails? 3  -EE     Moving to and from a bed to a chair (including a wheelchair)? 2  -EE     Standing up from a chair using your arms (e.g., wheelchair, bedside chair)? 3  -EE     Climbing 3-5 steps with a railing? 1  -EE     To walk in hospital room? 2  -EE     AM-PAC 6 Clicks Score (PT) 14  -EE           User Key  (r) = Recorded By, (t) = Taken By, (c) = Cosigned By    Initials Name Provider Type    Sanjuanita Zuñiga PT Physical Therapist                             Physical Therapy Education                 Title: PT OT SLP Therapies  (In Progress)     Topic: Physical Therapy (In Progress)     Point: Mobility training (In Progress)     Learning Progress Summary           Patient Acceptance, E,TB, VU,NR by EE at 4/18/2022 1552    Acceptance, E,TB,D, VU,NR by EJ at 4/17/2022 1621    Acceptance, E, NR by LH at 4/14/2022 1025    Acceptance, E, NR by CF at 4/12/2022 1349    Acceptance, E, VU by DB at 4/10/2022 1614    Acceptance, E,TB, NR by MS at 4/7/2022 1535   Family Acceptance, E,TB,D, VU,NR by EJ at 4/17/2022 1621    Acceptance, E, VU by DB at 4/10/2022 1614   Significant Other Acceptance, E,TB, NR by MS at 4/7/2022 1535                   Point: Home exercise program (In Progress)     Learning Progress Summary           Patient Acceptance, E,TB, VU,NR by EE at 4/18/2022 1552    Acceptance, E,TB,D, VU,NR by EJ at 4/17/2022 1621    Acceptance, E, NR by LH at 4/14/2022 1025    Acceptance, E, NR by CF at 4/12/2022 1349    Acceptance, E, VU by DB at 4/10/2022 1614    Acceptance, E,TB, NR by MS at 4/7/2022 1535   Family Acceptance, E,TB,D, VU,NR by EJ at 4/17/2022 1621    Acceptance, E, VU by DB at 4/10/2022 1614   Significant Other Acceptance, E,TB, NR by MS at 4/7/2022 1535                   Point: Body mechanics (In Progress)     Learning Progress Summary           Patient Acceptance, E,TB, VU,NR by EE at 4/18/2022 1552    Acceptance, E, NR by LH at 4/14/2022 1025    Acceptance, E, NR by CF at 4/12/2022 1349    Acceptance, E, VU by DB at 4/10/2022 1614    Acceptance, E,TB, NR by MS at 4/7/2022 1535   Family Acceptance, E, VU by DB at 4/10/2022 1614   Significant Other Acceptance, E,TB, NR by MS at 4/7/2022 1535                   Point: Precautions (In Progress)     Learning Progress Summary           Patient Acceptance, E,TB, VU,NR by EE at 4/18/2022 1552    Acceptance, E, NR by LH at 4/14/2022 1025    Acceptance, E, NR by CF at 4/12/2022 1349    Acceptance, E, VU by DB at 4/10/2022 1614    Acceptance, E,TB, NR by MS at 4/7/2022 1535   Family  Acceptance, E, VU by DB at 4/10/2022 1614   Significant Other Acceptance, E,TB, NR by MS at 4/7/2022 1535                               User Key     Initials Effective Dates Name Provider Type Discipline     06/16/21 -  Sandi Shepard, PT Physical Therapist PT    EE 06/16/21 -  Sanjuanita Soto, PT Physical Therapist PT    EJ 06/16/21 -  Lorri Knox, PT Physical Therapist PT    MS 06/16/21 -  Goldie Abrams, PT Physical Therapist PT    DB 06/16/21 -  Angelica Pierson, PT Physical Therapist PT    CF 06/16/21 -  Rhoda Antunez, PT Physical Therapist PT              PT Recommendation and Plan     Plan of Care Reviewed With: patient  Progress: improving  Outcome Evaluation: Pt demonstrates good progress with mobility. Demonstrates improved strength and balance, as evidenced by increased independence with bed mobility, transfers, and ambulation today. Pt able to tolerate increased activity today, also. Pt continues to require max A to block L knee into extension to prevent buckling; however, was otherwise able to perform sit to stand with CGA when knee blocked. Pt very motivated for therapy and states his wife has been helping him with supine exercises throughout the day. Pt will continue to benefit from PT for ongoing strengthening and mobility training. Recommend DC to acute rehab to maximize independence with mobility and assist with return to PLOF.     Time Calculation:    PT Charges     Row Name 04/18/22 1552             Time Calculation    Start Time 1502  -EE      Stop Time 1522  -EE      Time Calculation (min) 20 min  -EE      PT Received On 04/18/22  -EE      PT - Next Appointment 04/19/22  -EE      PT Goal Re-Cert Due Date 04/25/22  -EE              Time Calculation- PT    Total Timed Code Minutes- PT 20 minute(s)  -EE              Timed Charges    31747 - PT Therapeutic Activity Minutes 20  -EE              Total Minutes    Timed Charges Total Minutes 20  -EE       Total Minutes 20  -EE             User Key  (r) = Recorded By, (t) = Taken By, (c) = Cosigned By    Initials Name Provider Type    EE Sanjuanita Soto PT Physical Therapist              Therapy Charges for Today     Code Description Service Date Service Provider Modifiers Qty    [id number]  PT THERAPEUTIC ACT EA 15 MIN 4/18/2022 Sanjuanita Soto, PT GP 1    17982460333 HC PT THER SUPP EA 15 MIN 4/18/2022 Sanjuanita Soto, PT GP 1          PT G-Codes  Outcome Measure Options: AM-PAC 6 Clicks Basic Mobility (PT)  AM-PAC 6 Clicks Score (PT): 14  AM-PAC 6 Clicks Score (OT): 15     Patient was not wearing a face mask during this therapy encounter. Therapist used appropriate personal protective equipment including mask and gloves.  Mask used was standard procedure mask. Appropriate PPE was worn during the entire therapy session. Hand hygiene was completed before and after therapy session. Patient is not in enhanced droplet precautions.       Sanjuanita Soto PT  4/18/2022

## 2022-04-18 NOTE — TELEPHONE ENCOUNTER
Rx Refill Note  Requested Prescriptions     Pending Prescriptions Disp Refills   • insulin lispro (humaLOG) 100 UNIT/ML injection 30 mL 6     Sig: USE AS DIRECTED WITH V-GO PUMP      Last office visit with prescribing clinician: 3/21/2022      Next office visit with prescribing clinician: 9/22/2022            Amirah Coleman MA  04/18/22, 15:12 EDT

## 2022-04-18 NOTE — PLAN OF CARE
Goal Outcome Evaluation:  Plan of Care Reviewed With: patient        Progress: no change  Outcome Evaluation:     A&O x4; VSS; Room air  Accu-checks    Afib on telemetry with multilple asymptomatic pauses. Largest pause for this shift was (3.3 seconds). It appears pt has had several larger pauses that are saved in spacelabs; and that [facility] and Cardiology are aware of this. Largest saved pause being 6.4 seconds on 4/16/22.    No complaints of pain; no signs of distress.     will continue to monitor.

## 2022-04-18 NOTE — PROGRESS NOTES
Adult Nutrition  Assessment/PES    Patient Name:  Francisco Sequeira  YOB: 1935  MRN: [id number]  Admit Date:  4/5/2022    Assessment Date:  4/18/2022    Comments:  Nutrition assessment triggered by LOS.  Admitted with LLE weakness/swelling - hematoma of iliopsoas muscle.  Weakness, but improving.  D/c'ed 4/4 following stay for cholecystitis s/p lap teja 3/29 and aspiration PNA.      Eating well, % at meals.  Awaiting precert for rehab.    RD to continue to follow.     Reason for Assessment     Row Name 04/18/22 3759          Reason for Assessment    Reason For Assessment per organizational policy  LOS     Diagnosis cardiac disease;renal disease;diabetes diagnosis/complications;infection/sepsis;gastrointestinal disease;cancer diagnosis/related complications;other (see comments)  VHD, Afib, chronic AC, CKD3, DM2, HTN, NICM, AV insufficiency, AAA, bacteremia, CMY, GERD, HLD, kidney carcinoma; adm with hematoma of iliopsoas muscle                Nutrition/Diet History     Row Name 04/18/22 8222          Nutrition/Diet History    Typical Intake (Food/Fluid/EN/PN) eating % at meals                  Labs/Tests/Procedures/Meds     Row Name 04/18/22 8248          Labs/Procedures/Meds    Lab Results Reviewed reviewed, pertinent     Lab Results Comments Gluc, Ca, WBC, Hgb, Hct            Diagnostic Tests/Procedures    Diagnostic Test/Procedure Reviewed reviewed, pertinent     Diagnostic Test/Procedures Comments recent lap teja 3/29 (last adm, d/c'ed 4/4)            Medications    Pertinent Medications Reviewed reviewed, pertinent     Pertinent Medications Comments lovenox, pepcid, FeSO4, lantus, admelog, risaquad, MagOx, coumadin                Physical Findings     Row Name 04/18/22 4330          Physical Findings    Overall Physical Appearance B=15, L cervical spine abrasion, abd inc; mild edema                  Nutrition Prescription Ordered     Row Name 04/18/22 3089          Nutrition  Prescription PO    Current PO Diet Regular     Common Modifiers Cardiac;Consistent Carbohydrate                Evaluation of Received Nutrient/Fluid Intake     Row Name 04/18/22 1359          PO Evaluation    Number of Meals 3     % PO Intake                      Problem/Interventions:   Problem 1     Row Name 04/18/22 1400          Nutrition Diagnoses Problem 1    Problem 1 Nutrition Appropriate for Condition at this Time                      Intervention Goal     Row Name 04/18/22 1402          Intervention Goal    General Maintain nutrition;Disease management/therapy;Meet nutritional needs for age/condition     PO Maintain intake     Weight Maintain weight                Nutrition Intervention     Row Name 04/18/22 1402          Nutrition Intervention    RD/Tech Action Follow Tx progress;Care plan reviewd                  Education/Evaluation     Row Name 04/18/22 1402          Education    Education Will Instruct as appropriate            Monitor/Evaluation    Monitor Per protocol;PO intake;Pertinent labs;Weight;Skin status;Symptoms                 Electronically signed by:  Nicolette Churchill RD  04/18/22 14:02 EDT

## 2022-04-19 LAB
ANION GAP SERPL CALCULATED.3IONS-SCNC: 4.8 MMOL/L (ref 5–15)
BUN SERPL-MCNC: 14 MG/DL (ref 8–23)
BUN/CREAT SERPL: 12.7 (ref 7–25)
CALCIUM SPEC-SCNC: 8.4 MG/DL (ref 8.6–10.5)
CHLORIDE SERPL-SCNC: 105 MMOL/L (ref 98–107)
CO2 SERPL-SCNC: 27.2 MMOL/L (ref 22–29)
CREAT SERPL-MCNC: 1.1 MG/DL (ref 0.76–1.27)
DEPRECATED RDW RBC AUTO: 47.7 FL (ref 37–54)
EGFRCR SERPLBLD CKD-EPI 2021: 66.2 ML/MIN/1.73
ERYTHROCYTE [DISTWIDTH] IN BLOOD BY AUTOMATED COUNT: 15.9 % (ref 12.3–15.4)
GLUCOSE BLDC GLUCOMTR-MCNC: 106 MG/DL (ref 70–130)
GLUCOSE BLDC GLUCOMTR-MCNC: 140 MG/DL (ref 70–130)
GLUCOSE BLDC GLUCOMTR-MCNC: 174 MG/DL (ref 70–130)
GLUCOSE BLDC GLUCOMTR-MCNC: 80 MG/DL (ref 70–130)
GLUCOSE SERPL-MCNC: 96 MG/DL (ref 65–99)
HCT VFR BLD AUTO: 29.2 % (ref 37.5–51)
HGB BLD-MCNC: 9.3 G/DL (ref 13–17.7)
INR PPP: 1.82 (ref 0.9–1.1)
MAGNESIUM SERPL-MCNC: 2.3 MG/DL (ref 1.6–2.4)
MCH RBC QN AUTO: 26.1 PG (ref 26.6–33)
MCHC RBC AUTO-ENTMCNC: 31.8 G/DL (ref 31.5–35.7)
MCV RBC AUTO: 82 FL (ref 79–97)
PHOSPHATE SERPL-MCNC: 3 MG/DL (ref 2.5–4.5)
PLATELET # BLD AUTO: 217 10*3/MM3 (ref 140–450)
PMV BLD AUTO: 10.9 FL (ref 6–12)
POTASSIUM SERPL-SCNC: 4.4 MMOL/L (ref 3.5–5.2)
PROTHROMBIN TIME: 21.1 SECONDS (ref 11.7–14.2)
RBC # BLD AUTO: 3.56 10*6/MM3 (ref 4.14–5.8)
SODIUM SERPL-SCNC: 137 MMOL/L (ref 136–145)
WBC NRBC COR # BLD: 3.1 10*3/MM3 (ref 3.4–10.8)

## 2022-04-19 PROCEDURE — 82962 GLUCOSE BLOOD TEST: CPT

## 2022-04-19 PROCEDURE — 97110 THERAPEUTIC EXERCISES: CPT

## 2022-04-19 PROCEDURE — 80048 BASIC METABOLIC PNL TOTAL CA: CPT | Performed by: STUDENT IN AN ORGANIZED HEALTH CARE EDUCATION/TRAINING PROGRAM

## 2022-04-19 PROCEDURE — 36415 COLL VENOUS BLD VENIPUNCTURE: CPT | Performed by: STUDENT IN AN ORGANIZED HEALTH CARE EDUCATION/TRAINING PROGRAM

## 2022-04-19 PROCEDURE — 97530 THERAPEUTIC ACTIVITIES: CPT

## 2022-04-19 PROCEDURE — 25010000002 ENOXAPARIN PER 10 MG: Performed by: HOSPITALIST

## 2022-04-19 PROCEDURE — 84100 ASSAY OF PHOSPHORUS: CPT | Performed by: STUDENT IN AN ORGANIZED HEALTH CARE EDUCATION/TRAINING PROGRAM

## 2022-04-19 PROCEDURE — 83735 ASSAY OF MAGNESIUM: CPT | Performed by: STUDENT IN AN ORGANIZED HEALTH CARE EDUCATION/TRAINING PROGRAM

## 2022-04-19 PROCEDURE — 63710000001 INSULIN LISPRO (HUMAN) PER 5 UNITS: Performed by: NURSE PRACTITIONER

## 2022-04-19 PROCEDURE — 85027 COMPLETE CBC AUTOMATED: CPT | Performed by: STUDENT IN AN ORGANIZED HEALTH CARE EDUCATION/TRAINING PROGRAM

## 2022-04-19 PROCEDURE — 85610 PROTHROMBIN TIME: CPT | Performed by: NURSE PRACTITIONER

## 2022-04-19 PROCEDURE — 97535 SELF CARE MNGMENT TRAINING: CPT

## 2022-04-19 RX ADMIN — FAMOTIDINE 20 MG: 20 TABLET ORAL at 17:20

## 2022-04-19 RX ADMIN — FAMOTIDINE 20 MG: 20 TABLET ORAL at 06:24

## 2022-04-19 RX ADMIN — Medication 10 ML: at 21:07

## 2022-04-19 RX ADMIN — MAGNESIUM OXIDE 400 MG (241.3 MG MAGNESIUM) TABLET 400 MG: TABLET at 09:54

## 2022-04-19 RX ADMIN — ATORVASTATIN CALCIUM 40 MG: 20 TABLET, FILM COATED ORAL at 09:54

## 2022-04-19 RX ADMIN — INSULIN GLARGINE-YFGN 15 UNITS: 100 INJECTION, SOLUTION SUBCUTANEOUS at 21:08

## 2022-04-19 RX ADMIN — ENOXAPARIN SODIUM 70 MG: 100 INJECTION SUBCUTANEOUS at 21:06

## 2022-04-19 RX ADMIN — METOPROLOL SUCCINATE 12.5 MG: 25 TABLET, EXTENDED RELEASE ORAL at 09:54

## 2022-04-19 RX ADMIN — ENOXAPARIN SODIUM 70 MG: 100 INJECTION SUBCUTANEOUS at 09:54

## 2022-04-19 RX ADMIN — Medication 10 ML: at 09:55

## 2022-04-19 RX ADMIN — INSULIN LISPRO 2 UNITS: 100 INJECTION, SOLUTION INTRAVENOUS; SUBCUTANEOUS at 11:52

## 2022-04-19 RX ADMIN — Medication 1 CAPSULE: at 09:54

## 2022-04-19 RX ADMIN — NYSTATIN OINTMENT 1 APPLICATION: 100000 OINTMENT TOPICAL at 09:55

## 2022-04-19 RX ADMIN — DIGOXIN 62.5 MCG: 125 TABLET ORAL at 11:52

## 2022-04-19 RX ADMIN — NYSTATIN 1 APPLICATION: 100000 POWDER TOPICAL at 21:08

## 2022-04-19 RX ADMIN — NYSTATIN OINTMENT 1 APPLICATION: 100000 OINTMENT TOPICAL at 00:58

## 2022-04-19 RX ADMIN — MAGNESIUM OXIDE 400 MG (241.3 MG MAGNESIUM) TABLET 400 MG: TABLET at 21:06

## 2022-04-19 RX ADMIN — NYSTATIN 1 APPLICATION: 100000 POWDER TOPICAL at 09:55

## 2022-04-19 RX ADMIN — WARFARIN 7.5 MG: 7.5 TABLET ORAL at 17:20

## 2022-04-19 NOTE — PLAN OF CARE
Goal Outcome Evaluation:  Plan of Care Reviewed With: patient, spouse        Progress: improving  Outcome Evaluation: Pt with noted functional improvements toward all stated goals this date. Bed mob completed with SBA, increased time at EOB with SPV-SBA for full ADL routine, including TBB, UB dressing, and g/h tasks. Completed with s/up at tabletop while unsupported at EOB. Pt completed BUE ther ex 2x20 in functional planes to improve UE strength/endurance required to support self throughout functional tasks. Pt completed x7 STS from EOB this date, min-mod A for PT blocking L knee with STS activity, CGA at gait belt for support with transition to RWx. Pt completed bed <> chair transfer with min Ax2 and Rwx. Pt remains motivated and eager to participate with therapy, with improvements noted each session. D/c acute rehab.    Therapist in mask, gloves and hand hygiene performed.

## 2022-04-19 NOTE — PLAN OF CARE
Goal Outcome Evaluation:  Plan of Care Reviewed With: patient        Progress: improving  Outcome Evaluation:     A&O x4; VSS; afib on telemetry; Room air.     Accu-checks.     Skin care provided; magic barrier cream and nystatin powder applied.     Q2 turning. Accumax pump, and waffle overlay in use.     No complaints of pain. No signs of distress. Will continue to monitor.

## 2022-04-19 NOTE — PROGRESS NOTES
[facility] Acute Rehab     Precert has been started and clinicals faxed in.  Await determination.     Thanks,   Ana Barfield RN  Rehab Admission Nurse   [phone]

## 2022-04-19 NOTE — PLAN OF CARE
Goal Outcome Evaluation:  Plan of Care Reviewed With: patient        Progress: improving  Outcome Evaluation: Pt continues to demonstrate good progress with therapy and was able to tolerate increased activity and more time in standing today. Continues to be limited by proximal LLE weakness and needs L knee blocked in stance to prevent buckling; however, is needed less assistance for transfers, balance, and gait. Pt remains motivated and is eager to transfer to acute rehab when able. Will continue to benefit from PT for ongoing strengthening and mobility training.

## 2022-04-19 NOTE — PROGRESS NOTES
"DAILY PROGRESS NOTE  [facility]    Patient Identification:  Name: Francisco Sequeira  Age: 86 y.o.  Sex: male  :  1935  MRN: [id number]         Primary Care Physician: Rubin Hinkle APRN    Subjective:  Interval History:His right leg is weak.   He is weak.    Objective:    Scheduled Meds:atorvastatin, 40 mg, Oral, Daily  digoxin, 62.5 mcg, Oral, Daily  enoxaparin, 1 mg/kg, Subcutaneous, BID  famotidine, 20 mg, Oral, BID AC  ferrous sulfate, 325 mg, Oral, Every Other Day  insulin glargine, 15 Units, Subcutaneous, Nightly  insulin lispro, 0-9 Units, Subcutaneous, TID AC  lactobacillus acidophilus, 1 capsule, Oral, Daily  magnesium oxide, 400 mg, Oral, BID  metoprolol succinate XL, 12.5 mg, Oral, Daily  nystatin, , Topical, Q12H  sodium chloride, 10 mL, Intravenous, Q12H  warfarin, 7.5 mg, Oral, Daily      Continuous Infusions:Pharmacy to dose warfarin,         Vital signs in last 24 hours:  Temp:  [97 °F (36.1 °C)-98 °F (36.7 °C)] 97 °F (36.1 °C)  Heart Rate:  [] 86  Resp:  [16-18] 18  BP: (102-124)/(62-69) 102/64    Intake/Output:    Intake/Output Summary (Last 24 hours) at 2022 1511  Last data filed at 2022 0732  Gross per 24 hour   Intake 340 ml   Output --   Net 340 ml       Exam:  /64   Pulse 86   Temp 97 °F (36.1 °C) (Oral)   Resp 18   Ht 182.9 cm (72\")   Wt 68 kg (150 lb)   SpO2 100%   BMI 20.34 kg/m²     General Appearance:    Alert, cooperative, no distress   Head:    Normocephalic, without obvious abnormality, atraumatic   Eyes:       Throat:   Lips, tongue, gums normal   Neck:   Supple, symmetrical, trachea midline, no JVD   Lungs:     Clear to auscultation bilaterally, respirations unlabored   Chest Wall:    No tenderness or deformity    Heart:    Regular rate and rhythm, S1 and S2 normal, no murmur,no  Rub or gallop   Abdomen:     Soft, nontender, bowel sounds active, no masses, no organomegaly    Extremities:   Extremities normal, atraumatic, no " cyanosis or edema   Pulses:      Skin:   Skin is warm and dry,  no rashes or palpable lesions   Neurologic:   Right leg weakness      Lab Results (last 72 hours)     Procedure Component Value Units Date/Time    Digoxin Level [[id number]]  (Abnormal) Collected: 04/13/22 0652    Specimen: Blood Updated: 04/13/22 1121     Digoxin 0.50 ng/mL     POC Glucose Once [[id number]]  (Abnormal) Collected: 04/13/22 1116    Specimen: Blood Updated: 04/13/22 1119     Glucose 144 mg/dL      Comment: Meter: [id number] : [id number] Alyssa Ramses ALEJO       Basic Metabolic Panel [[id number]]  (Abnormal) Collected: 04/13/22 0652    Specimen: Blood Updated: 04/13/22 0750     Glucose 105 mg/dL      BUN 14 mg/dL      Creatinine 0.91 mg/dL      Sodium 136 mmol/L      Potassium 4.4 mmol/L      Chloride 105 mmol/L      CO2 24.0 mmol/L      Calcium 8.3 mg/dL      BUN/Creatinine Ratio 15.4     Anion Gap 7.0 mmol/L      eGFR 83.1 mL/min/1.73      Comment: National Kidney Foundation and American Society of Nephrology (ASN) Task Force recommended calculation based on the Chronic Kidney Disease Epidemiology Collaboration (CKD-EPI) equation refit without adjustment for race.       Narrative:      GFR Normal >60  Chronic Kidney Disease <60  Kidney Failure <15      Phosphorus [[id number]]  (Normal) Collected: 04/13/22 0652    Specimen: Blood Updated: 04/13/22 0750     Phosphorus 3.1 mg/dL     Magnesium [[id number]]  (Normal) Collected: 04/13/22 0652    Specimen: Blood Updated: 04/13/22 0750     Magnesium 2.3 mg/dL     Body Fluid Culture - Aspirate, Psoas [[id number]] Collected: 04/11/22 1305    Specimen: Aspirate from Psoas Updated: 04/13/22 0734     Body Fluid Culture No growth at 2 days     Gram Stain Few (2+) WBCs per low power field      No organisms seen    CBC (No Diff) [[id number]]  (Abnormal) Collected: 04/13/22 0652    Specimen: Blood Updated: 04/13/22 0716     WBC 4.12 10*3/mm3      RBC 3.51 10*6/mm3      Hemoglobin 9.3 g/dL      Hematocrit  29.8 %      MCV 84.9 fL      MCH 26.5 pg      MCHC 31.2 g/dL      RDW 16.3 %      RDW-SD 50.6 fl      MPV 10.7 fL      Platelets 259 10*3/mm3     POC Glucose Once [[id number]]  (Normal) Collected: 04/13/22 0639    Specimen: Blood Updated: 04/13/22 0640     Glucose 101 mg/dL      Comment: Meter: [id number] : [id number] Gamboa Damon NA       POC Glucose Once [[id number]]  (Normal) Collected: 04/13/22 0236    Specimen: Blood Updated: 04/13/22 0237     Glucose 104 mg/dL      Comment: Meter: [id number] : [id number] Jason Sweeney RN       POC Glucose Once [[id number]]  (Abnormal) Collected: 04/12/22 1950    Specimen: Blood Updated: 04/12/22 1951     Glucose 143 mg/dL      Comment: Meter: [id number] : [id number] Gamboa Damon NA       POC Glucose Once [[id number]]  (Normal) Collected: 04/12/22 1610    Specimen: Blood Updated: 04/12/22 1612     Glucose 108 mg/dL      Comment: Meter: [id number] : [id number] Abhinav ALEJO       Basic Metabolic Panel [[id number]]  (Abnormal) Collected: 04/12/22 1157    Specimen: Blood Updated: 04/12/22 1233     Glucose 159 mg/dL      BUN 16 mg/dL      Creatinine 1.19 mg/dL      Sodium 134 mmol/L      Potassium 4.6 mmol/L      Chloride 102 mmol/L      CO2 24.7 mmol/L      Calcium 8.3 mg/dL      BUN/Creatinine Ratio 13.4     Anion Gap 7.3 mmol/L      eGFR 60.2 mL/min/1.73      Comment: National Kidney Foundation and American Society of Nephrology (ASN) Task Force recommended calculation based on the Chronic Kidney Disease Epidemiology Collaboration (CKD-EPI) equation refit without adjustment for race.       Narrative:      GFR Normal >60  Chronic Kidney Disease <60  Kidney Failure <15      Phosphorus [[id number]]  (Normal) Collected: 04/12/22 1157    Specimen: Blood Updated: 04/12/22 1233     Phosphorus 3.1 mg/dL     Magnesium [[id number]]  (Normal) Collected: 04/12/22 1157    Specimen: Blood Updated: 04/12/22 1233     Magnesium 2.2 mg/dL     CBC (No Diff) [[id number]]  (Abnormal)  Collected: 04/12/22 1157    Specimen: Blood Updated: 04/12/22 1211     WBC 5.56 10*3/mm3      RBC 3.33 10*6/mm3      Hemoglobin 8.7 g/dL      Hematocrit 28.5 %      MCV 85.6 fL      MCH 26.1 pg      MCHC 30.5 g/dL      RDW 16.1 %      RDW-SD 49.5 fl      MPV 10.9 fL      Platelets 273 10*3/mm3     POC Glucose Once [[id number]]  (Abnormal) Collected: 04/12/22 1106    Specimen: Blood Updated: 04/12/22 1108     Glucose 154 mg/dL      Comment: Meter: [id number] : [id number] Abhinav ALEJO       POC Glucose Once [[id number]]  (Normal) Collected: 04/12/22 0621    Specimen: Blood Updated: 04/12/22 0622     Glucose 92 mg/dL      Comment: Meter: [id number] : [id number] Markos Nataia NA       POC Glucose Once [[id number]]  (Abnormal) Collected: 04/12/22 0602    Specimen: Blood Updated: 04/12/22 0604     Glucose 37 mg/dL      Comment: RN Notified R and V Meter: [id number] : [id number] Markos Nataia NA       POC Glucose Once [[id number]]  (Abnormal) Collected: 04/12/22 0601    Specimen: Blood Updated: 04/12/22 0602     Glucose 33 mg/dL      Comment: Repeat Test Meter: [id number] : [id number] Markos Nataia NA       POC Glucose Once [[id number]]  (Normal) Collected: 04/11/22 1936    Specimen: Blood Updated: 04/11/22 1938     Glucose 125 mg/dL      Comment: Meter: [id number] : [id number] Markos Nataia NA       POC Glucose Once [[id number]]  (Abnormal) Collected: 04/11/22 1610    Specimen: Blood Updated: 04/11/22 1611     Glucose 172 mg/dL      Comment: RN Notified R and V Meter: [id number] : [id number] Sen ALEJO       Basic Metabolic Panel [[id number]]  (Abnormal) Collected: 04/11/22 1123    Specimen: Blood Updated: 04/11/22 1246     Glucose 103 mg/dL      BUN 13 mg/dL      Creatinine 1.15 mg/dL      Sodium 136 mmol/L      Potassium 4.5 mmol/L      Chloride 103 mmol/L      CO2 26.0 mmol/L      Calcium 8.0 mg/dL      BUN/Creatinine Ratio 11.3     Anion Gap 7.0 mmol/L      eGFR 62.8 mL/min/1.73      Comment:  National Kidney Foundation and American Society of Nephrology (ASN) Task Force recommended calculation based on the Chronic Kidney Disease Epidemiology Collaboration (CKD-EPI) equation refit without adjustment for race.       Narrative:      GFR Normal >60  Chronic Kidney Disease <60  Kidney Failure <15      Magnesium [[id number]]  (Normal) Collected: 04/11/22 1123    Specimen: Blood Updated: 04/11/22 1246     Magnesium 2.1 mg/dL     Phosphorus [[id number]]  (Normal) Collected: 04/11/22 1123    Specimen: Blood Updated: 04/11/22 1246     Phosphorus 3.3 mg/dL     CBC (No Diff) [[id number]]  (Abnormal) Collected: 04/11/22 1123    Specimen: Blood Updated: 04/11/22 1230     WBC 5.50 10*3/mm3      RBC 3.28 10*6/mm3      Hemoglobin 8.8 g/dL      Hematocrit 28.2 %      MCV 86.0 fL      MCH 26.8 pg      MCHC 31.2 g/dL      RDW 15.9 %      RDW-SD 49.4 fl      MPV 11.3 fL      Platelets 288 10*3/mm3     POC Glucose Once [[id number]]  (Normal) Collected: 04/11/22 1103    Specimen: Blood Updated: 04/11/22 1105     Glucose 93 mg/dL      Comment: Meter: [id number] : [id number] Sen Trent NA       POC Glucose Once [[id number]]  (Normal) Collected: 04/11/22 0537    Specimen: Blood Updated: 04/11/22 0538     Glucose 98 mg/dL      Comment: Meter: [id number] : [id number] Ceasar Gladys NA       POC Glucose Once [[id number]]  (Normal) Collected: 04/11/22 0241    Specimen: Blood Updated: 04/11/22 0242     Glucose 97 mg/dL      Comment: Meter: [id number] : [id number] Ceasar Gladys NA       POC Glucose Once [[id number]]  (Normal) Collected: 04/10/22 1949    Specimen: Blood Updated: 04/10/22 1950     Glucose 113 mg/dL      Comment: Meter: [id number] : [id number] Jason Sweeney RN       POC Glucose Once [[id number]]  (Abnormal) Collected: 04/10/22 1602    Specimen: Blood Updated: 04/10/22 1603     Glucose 159 mg/dL      Comment: Meter: [id number] : [id number] Leon Reyes Clausia NA           Data Review:  Results from last 7  days   Lab Units 04/19/22  0723 04/18/22  0721 04/17/22  0838   SODIUM mmol/L 137 138 137   POTASSIUM mmol/L 4.4 4.7 4.6   CHLORIDE mmol/L 105 105 105   CO2 mmol/L 27.2 26.4 24.5   BUN mg/dL 14 16 17   CREATININE mg/dL 1.10 1.19 1.29*   GLUCOSE mg/dL 96 124* 157*   CALCIUM mg/dL 8.4* 8.4* 8.3*     Results from last 7 days   Lab Units 04/19/22  0723 04/18/22  0721 04/17/22  0838   WBC 10*3/mm3 3.10* 3.15* 3.99   HEMOGLOBIN g/dL 9.3* 9.2* 9.0*   HEMATOCRIT % 29.2* 29.4* 29.4*   PLATELETS 10*3/mm3 217 214 214             Lab Results   Lab Value Date/Time    TROPONINT 0.030 04/06/2022 0002    TROPONINT 0.018 03/30/2022 1625    TROPONINT 0.027 02/18/2022 2305    TROPONINT 0.011 10/23/2021 1617    TROPONINT 0.017 07/15/2021 2029    TROPONINT <0.010 01/13/2020 0747    TROPONINT <0.010 03/30/2017 0809               Invalid input(s): PROT, LABALBU          Glucose   Date/Time Value Ref Range Status   04/19/2022 1110 174 (H) 70 - 130 mg/dL Final     Comment:     Meter: [id number] : [id number] Anabella Wilkinson NA   04/19/2022 0609 106 70 - 130 mg/dL Final     Comment:     Meter: [id number] : [id number] Ceasar Quirogail NA   04/18/2022 2018 147 (H) 70 - 130 mg/dL Final     Comment:     Meter: [id number] : [id number] Ceasar Gladys NA   04/18/2022 1559 128 70 - 130 mg/dL Final     Comment:     Meter: [id number] : [id number] Sen Brightina NA   04/18/2022 1142 199 (H) 70 - 130 mg/dL Final     Comment:     RN Notified R and V Meter: [id number] : [id number] Sen Miley NA   04/18/2022 0611 123 70 - 130 mg/dL Final     Comment:     Meter: [id number] : [id number] Ceasar ALEJO   04/17/2022 2035 155 (H) 70 - 130 mg/dL Final     Comment:     Meter: [id number] : [id number] Ceasar ALEJO   04/17/2022 1559 183 (H) 70 - 130 mg/dL Final     Comment:     Meter: [id number] : [id number] Anurag ALEJO     Results from last 7 days   Lab Units 04/19/22  0723 04/18/22  0721 04/17/22  0838   INR  1.82* 1.57*  1.44*       Past Medical History:   Diagnosis Date   • Allergic rhinitis    • Aortic valve insufficiency    • Ascending aortic aneurysm (HCC)    • Atrial fibrillation (HCC)    • Bacteremia    • Calcific aortic stenosis of bicuspid valve    • Cardiac arrest (HCC)    • Cardiomyopathy (HCC)    • CKD (chronic kidney disease) stage 3, GFR 30-59 ml/min (HCC)    • Contact dermatitis due to poison ivy    • Elbow fracture    • Esophageal reflux    • GERD (gastroesophageal reflux disease)    • Head injury    • History of transfusion    • Hyperglycemia    • Hyperlipidemia    • Hypertension    • Kidney carcinoma (HCC)    • Nonischemic cardiomyopathy (HCC)    • Renal oncocytoma    • Seasonal allergic reaction    • Sinusitis    • Syncope    • Type 2 diabetes mellitus (HCC)     uncontrolled   • Visual field defect        Assessment:  Active Hospital Problems    Diagnosis  POA   • **Hematoma of iliopsoas muscle, left, initial encounter [S70.12XA]  Yes   • History of CVA (cerebrovascular accident) [Z86.73]  Not Applicable   • S/P laparoscopic cholecystectomy [Z90.49]  Not Applicable   • Anemia [D64.9]  Yes   • Chronic anticoagulation [Z79.01]  Not Applicable   • Stage 3b chronic kidney disease (HCC) [N18.32]  Yes   • H/O heart valve replacement with mechanical valve [Z95.2]  Not Applicable   • Type 2 diabetes mellitus (HCC) [E11.9]  Yes   • Hypertension [I10]  Yes   • Atrial fibrillation (HCC) [I48.91]  Yes      Resolved Hospital Problems   No resolved problems to display.       Plan:  Continue with current RX as per cardiology.  Continue coumadin  Will need  acute rehab for rehab. Follow lab.  DC planning.  Acute rehab soon Await PreCert..    Leobardo Juarez MD  4/19/2022  15:11 EDT

## 2022-04-19 NOTE — THERAPY TREATMENT NOTE
[facility] Patient Name: Francisco Sequeira  : 1935    MRN: [id number]                              Today's Date: 2022       Admit Date: 2022    Visit Dx:     ICD-10-CM ICD-9-CM   1. Hematoma of iliopsoas muscle, left, initial encounter  S70.12XA 924.00   2. Chronic anticoagulation  Z79.01 V58.61   3. H/O heart valve replacement with mechanical valve  Z95.2 V43.3   4. Immobility syndrome  M62.3 728.3   5. Anemia, unspecified type  D64.9 285.9   6. 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)   • Iron deficiency anemia   • Chronic anticoagulation   • Hemiparesis of left nondominant side as late effect of cerebral infarction (HCC)   • Rectus sheath hematoma   • Sepsis (HCC)   • Calculus of gallbladder with acute cholecystitis without obstruction   • History of Clostridium difficile infection   • Aspiration pneumonitis (HCC)   • Hematoma of iliopsoas muscle, left, initial encounter   • History of CVA (cerebrovascular accident)   • S/P laparoscopic cholecystectomy   • Anemia     Past Medical History:   Diagnosis Date   • Allergic rhinitis    • Aortic valve insufficiency    • Ascending aortic aneurysm (HCC)    • Atrial fibrillation (HCC)    • Bacteremia    • Calcific aortic stenosis of bicuspid valve    • Cardiac arrest (HCC)    • Cardiomyopathy (HCC)    • CKD (chronic kidney disease) stage 3, GFR 30-59 ml/min (HCC)    • Contact dermatitis due to poison ivy    • Elbow fracture    • Esophageal reflux    • GERD (gastroesophageal reflux disease)    • Head  injury    • History of transfusion    • Hyperglycemia    • Hyperlipidemia    • Hypertension    • Kidney carcinoma (HCC)    • Nonischemic cardiomyopathy (HCC)    • Renal oncocytoma    • Seasonal allergic reaction    • Sinusitis    • Syncope    • Type 2 diabetes mellitus (HCC)     uncontrolled   • Visual field defect      Past Surgical History:   Procedure Laterality Date   • AORTIC VALVE REPAIR/REPLACEMENT     • ASCENDING AORTIC ANEURYSM REPAIR W/ MECHANICAL AORTIC VALVE REPLACEMENT     • CHOLECYSTECTOMY WITH INTRAOPERATIVE CHOLANGIOGRAM N/A 3/29/2022    Procedure: Laparoscopic cholecystectomy with cholangiogram, possible open;  Surgeon: Lisa Guidry MD;  Location: [facility] MAIN OR;  Service: General;  Laterality: N/A;   • COLONOSCOPY N/A 10/28/2021    Procedure: COLONOSCOPY to cecum:  cold snare polyps,;  Surgeon: Dinesh Meza MD;  Location: [facility] ENDOSCOPY;  Service: Gastroenterology;  Laterality: N/A;  pre:  Iron deficiency anemia  post:  polyps, diverticulosis,    • COLONOSCOPY N/A 11/9/2021    Procedure: COLONOSCOPY to cecum with APC cautery of AVM and clip placement x1;  Surgeon: Pavan Rodriguez MD;  Location: [facility] ENDOSCOPY;  Service: Gastroenterology;  Laterality: N/A;  PRE - gi bleed, anemia  POST - diverticulosis, poor prep, AVM right colon   • ENDOSCOPY N/A 10/28/2021    Procedure: ESOPHAGOGASTRODUODENOSCOPY with biopsies;  Surgeon: Dinesh Meza MD;  Location: [facility] ENDOSCOPY;  Service: Gastroenterology;  Laterality: N/A;  pre:  Iron deficiency anemia  post:  duodenitis and gastritis   • EYE SURGERY  12/09/2020    cataract surgery    • NEPHRECTOMY     • OTHER SURGICAL HISTORY      elbow surgery   • OTHER SURGICAL HISTORY      right arm surgery   • PROSTATE SURGERY     • THORACENTESIS Left     diagnostic      General Information     Row Name 04/19/22 1208          OT Time and Intention    Document Type therapy note (daily note)  -MW     Mode of Treatment co-treatment;physical  therapy;occupational therapy  -     Row Name 04/19/22 1208          General Information    Patient Profile Reviewed yes  -MW     Existing Precautions/Restrictions fall  -MW     Row Name 04/19/22 1208          Cognition    Orientation Status (Cognition) oriented x 3  -MW     Row Name 04/19/22 1208          Safety Issues, Functional Mobility    Impairments Affecting Function (Mobility) balance;endurance/activity tolerance;motor control;postural/trunk control;strength  -MW           User Key  (r) = Recorded By, (t) = Taken By, (c) = Cosigned By    Initials Name Provider Type    MW Raquel Singletary OT Occupational Therapist                 Mobility/ADL's     Row Name 04/19/22 1208          Bed Mobility    Bed Mobility supine-sit;scooting/bridging  -MW     Scooting/Bridging [place] (Bed Mobility) standby assist  -MW     Supine-Sit [place] (Bed Mobility) standby assist  -MW     Sit-Supine [place] (Bed Mobility) not tested  -MW     Bed Mobility, Safety Issues decreased use of legs for bridging/pushing  -MW     Assistive Device (Bed Mobility) bed rails;head of bed elevated  -     Comment, (Bed Mobility) increased time to complete bed mob, however no assist required, no cues for sequencing  -MW     Row Name 04/19/22 1208          Transfers    Transfers sit-stand transfer;bed-chair transfer  -     Comment, (Transfers) x7 STS from EOB, PT providing tactile cue at L knee for support, mostly CGA throughout STS activity with mod cues for proper transition to Rwx  -MW     Bed-Chair Davidson (Transfers) minimum assist (75% patient effort);2 person assist;moderate assist (50% patient effort);contact guard  -     Assistive Device (Bed-Chair Transfers) walker, front-wheeled  -     Sit-Stand Davidson (Transfers) minimum assist (75% patient effort);other (see comments)  min -mod A for blocking L knee otherwise mainly CGA  -MW     Row Name 04/19/22 1208          Bed-Chair Transfer    Comment, (Bed-Chair  Transfer) PT providing assist to block L knee, therapist providing CGA-min A at gait belt  -     Row Name 04/19/22 1208          Sit-Stand Transfer    Assistive Device (Sit-Stand Transfers) walker, front-wheeled  -     Row Name 04/19/22 1208          Activities of Daily Living    BADL Assessment/Intervention bathing;upper body dressing;grooming  -     Row Name 04/19/22 1208          Bathing Assessment/Intervention    [place] Level (Bathing) set up  -MW     Position (Bathing) edge of bed sitting  -     Comment, (Bathing) TBB completing at EOB with s/up, pt able to dynamic sit unsupported with SBA for total body  -MW     Row Name 04/19/22 1208          Upper Body Dressing Assessment/Training    [place] Level (Upper Body Dressing) upper body dressing skills;don;doff;front opening garment;set up  -MW     Position (Upper Body Dressing) edge of bed sitting  -     Row Name 04/19/22 1208          Grooming Assessment/Training    [place] Level (Grooming) grooming skills;hair care, combing/brushing;oral care regimen;shave face;wash face, hands;set up  -MW     Comment, (Grooming) table top at EOB with s/up  -MW           User Key  (r) = Recorded By, (t) = Taken By, (c) = Cosigned By    Initials Name Provider Type    Raquel Kebede OT Occupational Therapist               Obj/Interventions     Row Name 04/19/22 1211          Shoulder (Therapeutic Exercise)    Shoulder (Therapeutic Exercise) AROM (active range of motion)  -     Shoulder AROM (Therapeutic Exercise) bilateral;flexion;extension;aBduction;aDduction;20 repititions;2 sets;sitting  -     Row Name 04/19/22 1211          Elbow/Forearm (Therapeutic Exercise)    Elbow/Forearm (Therapeutic Exercise) AROM (active range of motion)  -     Elbow/Forearm AROM (Therapeutic Exercise) bilateral;flexion;extension;2 sets;20 repititions;sitting  -     Row Name 04/19/22 1211          Motor Skills    Therapeutic Exercise shoulder;elbow/forearm  -      Row Name 04/19/22 1211          Balance    Balance Assessment sitting static balance;sitting dynamic balance;sit to stand dynamic balance;standing static balance;standing dynamic balance  -MW     Static Sitting Balance supervision  -MW     Dynamic Sitting Balance standby assist  -MW     Position, Sitting Balance unsupported;sitting edge of bed  -MW     Sit to Stand Dynamic Balance contact guard  -MW     Static Standing Balance contact guard;2-person assist  -MW     Dynamic Standing Balance 2-person assist;minimal assist  -MW     Position/Device Used, Standing Balance supported;walker, front-wheeled  -     Balance Interventions standing;sitting;sit to stand;supported;dynamic;minimal challenge;static;occupation based/functional task;weight shifting activity  -MW           User Key  (r) = Recorded By, (t) = Taken By, (c) = Cosigned By    Initials Name Provider Type    Raquel Kebede OT Occupational Therapist               Goals/Plan    No documentation.                Clinical Impression     Row Name 04/19/22 1212          Pain Assessment    Pretreatment Pain Rating 0/10 - no pain  -MW     Posttreatment Pain Rating 0/10 - no pain  -MW     Row Name 04/19/22 1212          Plan of Care Review    Plan of Care Reviewed With patient;spouse  -     Progress improving  -     Outcome Evaluation Pt with noted functional improvements toward all stated goals this date. Bed mob completed with SBA, increased time at EOB with SPV-SBA for full ADL routine, including TBB, UB dressing, and g/h tasks. Completed with s/up at tabletop while unsupported at EOB. Pt completed BUE ther ex 2x20 in functional planes to improve UE strength/endurance required to support self throughout functional tasks. Pt completed x7 STS from EOB this date, min-mod A for PT blocking L knee with STS activity, CGA at gait belt for support with transition to RWx. Pt completed bed <> chair transfer with min Ax2 and Rwx. Pt remains motivated and eager  to participate with therapy, with improvements noted each session. D/c acute rehab.  -     Row Name 04/19/22 1212          Therapy Plan Review/Discharge Plan (OT)    Anticipated Discharge Disposition (OT) inpatient rehabilitation facility  -     Row Name 04/19/22 1212          Vital Signs    O2 Delivery Pre Treatment room air  -MW     O2 Delivery Intra Treatment room air  -MW     O2 Delivery Post Treatment room air  -MW     Pre Patient Position Supine  -MW     Intra Patient Position Standing  -MW     Post Patient Position Sitting  -MW     Row Name 04/19/22 1212          Positioning and Restraints    Pre-Treatment Position in bed  -MW     Post Treatment Position chair  -MW     In Chair notified nsg;reclined;call light within reach;encouraged to call for assist;with family/caregiver  -MW           User Key  (r) = Recorded By, (t) = Taken By, (c) = Cosigned By    Initials Name Provider Type    Raquel Kebede, OT Occupational Therapist               Outcome Measures     Row Name 04/19/22 1215          How much help from another is currently needed...    Putting on and taking off regular lower body clothing? 3  -MW     Bathing (including washing, rinsing, and drying) 3  -MW     Toileting (which includes using toilet bed pan or urinal) 3  -MW     Putting on and taking off regular upper body clothing 3  -MW     Taking care of personal grooming (such as brushing teeth) 3  -MW     Eating meals 4  -MW     AM-PAC 6 Clicks Score (OT) 19  -MW     Row Name 04/19/22 1200          How much help from another person do you currently need...    Turning from your back to your side while in flat bed without using bedrails? 3  -EE     Moving from lying on back to sitting on the side of a flat bed without bedrails? 3  -EE     Moving to and from a bed to a chair (including a wheelchair)? 2  -EE     Standing up from a chair using your arms (e.g., wheelchair, bedside chair)? 3  -EE     Climbing 3-5 steps with a railing? 1  -EE      To walk in hospital room? 2  -EE     AM-PAC 6 Clicks Score (PT) 14  -EE     Row Name 04/19/22 1215          Functional Assessment    Outcome Measure Options AM-PAC 6 Clicks Daily Activity (OT)  -           User Key  (r) = Recorded By, (t) = Taken By, (c) = Cosigned By    Initials Name Provider Type    Sanjuanita Zuñiga, PT Physical Therapist    Rqauel Kebede OT Occupational Therapist                Occupational Therapy Education                 Title: PT OT SLP Therapies (In Progress)     Topic: Occupational Therapy (In Progress)     Point: ADL training (Done)     Description:   Instruct learner(s) on proper safety adaptation and remediation techniques during self care or transfers.   Instruct in proper use of assistive devices.              Learning Progress Summary           Patient Acceptance, E, VU by MILLIE at 4/12/2022 1150    Comment: role of OT, plan of care, safety                   Point: Home exercise program (Not Started)     Description:   Instruct learner(s) on appropriate technique for monitoring, assisting and/or progressing therapeutic exercises/activities.              Learner Progress:  Not documented in this visit.          Point: Precautions (Done)     Description:   Instruct learner(s) on prescribed precautions during self-care and functional transfers.              Learning Progress Summary           Patient Acceptance, E, VU by MILLIE at 4/12/2022 1150    Comment: role of OT, plan of care, safety                   Point: Body mechanics (Done)     Description:   Instruct learner(s) on proper positioning and spine alignment during self-care, functional mobility activities and/or exercises.              Learning Progress Summary           Patient Acceptance, E, VU by MILLIE at 4/12/2022 1150    Comment: role of OT, plan of care, safety                               User Key     Initials Effective Dates Name Provider Type Joanne     06/10/21 -  Sujey Herron OT Occupational Therapist OT               OT Recommendation and Plan     Plan of Care Review  Plan of Care Reviewed With: patient, spouse  Progress: improving  Outcome Evaluation: Pt with noted functional improvements toward all stated goals this date. Bed mob completed with SBA, increased time at EOB with SPV-SBA for full ADL routine, including TBB, UB dressing, and g/h tasks. Completed with s/up at tabletop while unsupported at EOB. Pt completed BUE ther ex 2x20 in functional planes to improve UE strength/endurance required to support self throughout functional tasks. Pt completed x7 STS from EOB this date, min-mod A for PT blocking L knee with STS activity, CGA at gait belt for support with transition to RWx. Pt completed bed <> chair transfer with min Ax2 and Rwx. Pt remains motivated and eager to participate with therapy, with improvements noted each session. D/c acute rehab.     Time Calculation:    Time Calculation- OT     Row Name 04/19/22 1215             Time Calculation- OT    OT Start Time 1005  -MW      OT Stop Time 1043  -MW      OT Time Calculation (min) 38 min  -MW      Total Timed Code Minutes- OT 38 minute(s)  -MW      OT Received On 04/19/22  -MW      OT - Next Appointment 04/20/22  -MW              Timed Charges    38645 - OT Therapeutic Exercise Minutes 8  -MW      57276 - OT Therapeutic Activity Minutes 10  -MW      45888 - OT Self Care/Mgmt Minutes 20  -MW              Total Minutes    Timed Charges Total Minutes 38  -MW       Total Minutes 38  -MW            User Key  (r) = Recorded By, (t) = Taken By, (c) = Cosigned By    Initials Name Provider Type    MW Raquel Singletary OT Occupational Therapist              Therapy Charges for Today     Code Description Service Date Service Provider Modifiers Qty    15596635389 HC OT THER PROC EA 15 MIN 4/19/2022 Raquel Singletary OT GO 1    86093651909 HC OT THERAPEUTIC ACT EA 15 MIN 4/19/2022 Raquel Singletary OT GO 1    79819403006 HC OT SELF CARE/MGMT/TRAIN EA 15 MIN 4/19/2022  Gemini, Raquel, OT GO 1               Raquel Singleatry, OT  4/19/2022

## 2022-04-19 NOTE — PROGRESS NOTES
[facility] Clinical Pharmacy Services: Warfarin Dosing/Monitoring Consult    Francisco Sequeira is a 86 y.o. male, estimated creatinine clearance is 48.1 mL/min (by C-G formula based on SCr of 1.1 mg/dL). weighing 68 kg (150 lb).    Results from last 7 days   Lab Units 04/19/22  0723 04/18/22  0721 04/17/22  0838 04/16/22  1014 04/15/22  1723 04/15/22  0806   INR  1.82* 1.57* 1.44* 1.20* 1.18*  --    HEMOGLOBIN g/dL 9.3* 9.2* 9.0* 10.0*  --  10.0*   HEMATOCRIT % 29.2* 29.4* 29.4* 32.4*  --  31.6*   PLATELETS 10*3/mm3 217 214 214 259  --  273     Prior to admission anticoagulation: 7.5 mg PO daily on Mon, Wed, Fri; 5 mg PO daily on all other days of the week.    Hospital Anticoagulation:  Consulting provider: Iza Mckeon APRN  Start date: 4/15/22  Indication: Mechanical Valve  Target INR: 2.5 - 3.5  Bridge Therapy: Yes with enoxaparin     Potential food or drug interactions: none at this time    Education complete?/Date: No; plan for follow up TBD    Assessment/Plan:  INR is subtherapeutic at 1.82 but is continuing to trend up. I will continue warfarin 7.5 mg daily.  Monitor for any signs or symptoms of bleeding  Follow up daily INRs and dose adjustments    Pharmacy will continue to follow until discharge or discontinuation of warfarin.     Erika Michaud [facility]  Clinical Pharmacist

## 2022-04-19 NOTE — PLAN OF CARE
Problem: Fall Injury Risk  Goal: Absence of Fall and Fall-Related Injury  Outcome: Ongoing, Progressing  Intervention: Identify and Manage Contributors  Recent Flowsheet Documentation  Taken 4/19/2022 1200 by Tiffanie Whaley RN  Medication Review/Management: medications reviewed  Taken 4/19/2022 1038 by Tiffanie Whaley RN  Medication Review/Management: medications reviewed  Taken 4/19/2022 1000 by Tiffanie Whaley RN  Medication Review/Management: medications reviewed  Intervention: Promote Injury-Free Environment  Recent Flowsheet Documentation  Taken 4/19/2022 1420 by Tiffanie Whaley RN  Safety Promotion/Fall Prevention:   assistive device/personal items within reach   clutter free environment maintained   fall prevention program maintained   nonskid shoes/slippers when out of bed   room organization consistent   safety round/check completed  Taken 4/19/2022 1200 by Tiffanie Whaley RN  Safety Promotion/Fall Prevention:   assistive device/personal items within reach   clutter free environment maintained   fall prevention program maintained   nonskid shoes/slippers when out of bed   room organization consistent   safety round/check completed  Taken 4/19/2022 1038 by Tiffanie Whaley RN  Safety Promotion/Fall Prevention:   assistive device/personal items within reach   clutter free environment maintained   fall prevention program maintained   nonskid shoes/slippers when out of bed   room organization consistent   safety round/check completed  Taken 4/19/2022 1000 by Tiffanie Whaley RN  Safety Promotion/Fall Prevention:   assistive device/personal items within reach   clutter free environment maintained   fall prevention program maintained   nonskid shoes/slippers when out of bed   safety round/check completed   room organization consistent  Taken 4/19/2022 0807 by Tiffanie Whaley RN  Safety Promotion/Fall Prevention:   assistive device/personal items within reach   clutter free  environment maintained   fall prevention program maintained   nonskid shoes/slippers when out of bed   safety round/check completed   room organization consistent   Goal Outcome Evaluation:  Plan of Care Reviewed With: patient, spouse        Progress: improving  Outcome Evaluation: Pt balanced rest with activity throughout day. worked with pt/ot. Denies SOA, N/V/D, and pain. Family at bedside. Will continue to monitor closely

## 2022-04-19 NOTE — THERAPY TREATMENT NOTE
Patient Name: Francisco Sequeira  : 1935    MRN: [id number]                              Today's Date: 2022       Admit Date: 2022    Visit Dx:     ICD-10-CM ICD-9-CM   1. Hematoma of iliopsoas muscle, left, initial encounter  S70.12XA 924.00   2. Chronic anticoagulation  Z79.01 V58.61   3. H/O heart valve replacement with mechanical valve  Z95.2 V43.3   4. Immobility syndrome  M62.3 728.3   5. Anemia, unspecified type  D64.9 285.9   6. 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)   • Iron deficiency anemia   • Chronic anticoagulation   • Hemiparesis of left nondominant side as late effect of cerebral infarction (HCC)   • Rectus sheath hematoma   • Sepsis (HCC)   • Calculus of gallbladder with acute cholecystitis without obstruction   • History of Clostridium difficile infection   • Aspiration pneumonitis (HCC)   • Hematoma of iliopsoas muscle, left, initial encounter   • History of CVA (cerebrovascular accident)   • S/P laparoscopic cholecystectomy   • Anemia     Past Medical History:   Diagnosis Date   • Allergic rhinitis    • Aortic valve insufficiency    • Ascending aortic aneurysm (HCC)    • Atrial fibrillation (HCC)    • Bacteremia    • Calcific aortic stenosis of bicuspid valve    • Cardiac arrest (HCC)    • Cardiomyopathy (HCC)    • CKD (chronic kidney disease) stage 3, GFR 30-59 ml/min (HCC)    • Contact dermatitis due to poison ivy    • Elbow fracture    • Esophageal reflux    • GERD (gastroesophageal reflux disease)    • Head  injury    • History of transfusion    • Hyperglycemia    • Hyperlipidemia    • Hypertension    • Kidney carcinoma (HCC)    • Nonischemic cardiomyopathy (HCC)    • Renal oncocytoma    • Seasonal allergic reaction    • Sinusitis    • Syncope    • Type 2 diabetes mellitus (HCC)     uncontrolled   • Visual field defect      Past Surgical History:   Procedure Laterality Date   • AORTIC VALVE REPAIR/REPLACEMENT     • ASCENDING AORTIC ANEURYSM REPAIR W/ MECHANICAL AORTIC VALVE REPLACEMENT     • CHOLECYSTECTOMY WITH INTRAOPERATIVE CHOLANGIOGRAM N/A 3/29/2022    Procedure: Laparoscopic cholecystectomy with cholangiogram, possible open;  Surgeon: Lisa Guidry MD;  Location: [facility] MAIN OR;  Service: General;  Laterality: N/A;   • COLONOSCOPY N/A 10/28/2021    Procedure: COLONOSCOPY to cecum:  cold snare polyps,;  Surgeon: Dinesh Meza MD;  Location: [facility] ENDOSCOPY;  Service: Gastroenterology;  Laterality: N/A;  pre:  Iron deficiency anemia  post:  polyps, diverticulosis,    • COLONOSCOPY N/A 11/9/2021    Procedure: COLONOSCOPY to cecum with APC cautery of AVM and clip placement x1;  Surgeon: Pavan Rodriguez MD;  Location: [facility] ENDOSCOPY;  Service: Gastroenterology;  Laterality: N/A;  PRE - gi bleed, anemia  POST - diverticulosis, poor prep, AVM right colon   • ENDOSCOPY N/A 10/28/2021    Procedure: ESOPHAGOGASTRODUODENOSCOPY with biopsies;  Surgeon: Dinesh Meza MD;  Location: [facility] ENDOSCOPY;  Service: Gastroenterology;  Laterality: N/A;  pre:  Iron deficiency anemia  post:  duodenitis and gastritis   • EYE SURGERY  12/09/2020    cataract surgery    • NEPHRECTOMY     • OTHER SURGICAL HISTORY      elbow surgery   • OTHER SURGICAL HISTORY      right arm surgery   • PROSTATE SURGERY     • THORACENTESIS Left     diagnostic      General Information     Row Name 04/19/22 1154          Physical Therapy Time and Intention    Document Type therapy note (daily note)  -EE     Mode of Treatment  co-treatment;physical therapy;occupational therapy  -EE     Row Name 04/19/22 1154          General Information    Existing Precautions/Restrictions fall  -EE     Row Name 04/19/22 1154          Cognition    Orientation Status (Cognition) oriented x 3  -EE     Row Name 04/19/22 1154          Safety Issues, Functional Mobility    Impairments Affecting Function (Mobility) balance;endurance/activity tolerance;motor control;postural/trunk control;strength  -EE           User Key  (r) = Recorded By, (t) = Taken By, (c) = Cosigned By    Initials Name Provider Type    Sanjuanita Zuñiga, PT Physical Therapist               Mobility     Row Name 04/19/22 1154          Bed Mobility    Comment, (Bed Mobility) not tested, pt sitting up EOB with OT upon PT arrival  -     Row Name 04/19/22 1154          Transfers    Comment, (Transfers) 7x STS from EOB with cues for hand placement and forward weight shifting. Therapist providing tc's at L patellar tendon; however, only providing min/mod A to block L knee during STS. No additional assistance required for transfer other than CGA at gait belt for safety.  -EE     Row Name 04/19/22 1154          Sit-Stand Transfer    Sit-Stand [place] (Transfers) minimum assist (75% patient effort);other (see comments)  min to mod at L knee; otherwise CGA for sit to stand  -EE     Assistive Device (Sit-Stand Transfers) walker, front-wheeled  -EE     Row Name 04/19/22 1154          Gait/Stairs (Locomotion)    [place] Level (Gait) minimum assist (75% patient effort);2 person assist  -EE     Assistive Device (Gait) walker, front-wheeled  -EE     Distance in Feet (Gait) 3' bed to chair  -EE     Deviations/Abnormal Patterns (Gait) raul decreased;left sided deviations;stride length decreased  -EE     Bilateral Gait Deviations forward flexed posture  -EE     Left Sided Gait Deviations knee buckling, left side;heel strike decreased;weight shift ability decreased  -EE     Comment, (Gait/Stairs)  Therapist maximally blocking L knee into extension to prevent buckling during stance phase. Cues for sequencing with rwx. Second person min A at gait belt for balance and safety.  -EE           User Key  (r) = Recorded By, (t) = Taken By, (c) = Cosigned By    Initials Name Provider Type    Sanjuanita Zuñiga PT Physical Therapist               Obj/Interventions     Row Name 04/19/22 1157          Motor Skills    Therapeutic Exercise other (see comments)  Seated B ankle pumps x 10 reps. Seated R LAQ, marching x 10 reps. Seated passive L knee extension, active L knee flexion x 10 reps.  -EE     Row Name 04/19/22 1157          Balance    Static Standing Balance contact guard;2-person assist  -EE     Dynamic Standing Balance minimal assist;2-person assist  -EE     Position/Device Used, Standing Balance supported;walker, rolling  -EE           User Key  (r) = Recorded By, (t) = Taken By, (c) = Cosigned By    Initials Name Provider Type    Sanjuanita Zuñiga, CICI Physical Therapist               Goals/Plan    No documentation.                Clinical Impression     Row Name 04/19/22 1158          Pain    Pretreatment Pain Rating 0/10 - no pain  -EE     Posttreatment Pain Rating 0/10 - no pain  -EE     Row Name 04/19/22 1158          Plan of Care Review    Plan of Care Reviewed With patient  -EE     Progress improving  -EE     Outcome Evaluation Pt continues to demonstrate good progress with therapy and was able to tolerate increased activity and more time in standing today. Continues to be limited by proximal LLE weakness and needs L knee blocked in stance to prevent buckling; however, is needed less assistance for transfers, balance, and gait. Pt remains motivated and is eager to transfer to acute rehab when able. Will continue to benefit from PT for ongoing strengthening and mobility training.  -EE     Row Name 04/19/22 1158          Therapy Assessment/Plan (PT)    Rehab Potential (PT) good, to achieve stated therapy goals   -EE     Therapy Frequency (PT) 6 times/wk  -EE     Row Name 04/19/22 1158          Positioning and Restraints    Pre-Treatment Position in bed  -EE     Post Treatment Position chair  -EE     In Chair reclined;call light within reach;encouraged to call for assist;notified nsg;with family/caregiver;legs elevated  -EE           User Key  (r) = Recorded By, (t) = Taken By, (c) = Cosigned By    Initials Name Provider Type    Sanjuanita Zuñiga PT Physical Therapist               Outcome Measures     Row Name 04/19/22 1200          How much help from another person do you currently need...    Turning from your back to your side while in flat bed without using bedrails? 3  -EE     Moving from lying on back to sitting on the side of a flat bed without bedrails? 3  -EE     Moving to and from a bed to a chair (including a wheelchair)? 2  -EE     Standing up from a chair using your arms (e.g., wheelchair, bedside chair)? 3  -EE     Climbing 3-5 steps with a railing? 1  -EE     To walk in hospital room? 2  -EE     AM-PAC 6 Clicks Score (PT) 14  -EE           User Key  (r) = Recorded By, (t) = Taken By, (c) = Cosigned By    Initials Name Provider Type    Sanjuanita Zuñiga PT Physical Therapist                             Physical Therapy Education                 Title: PT OT SLP Therapies (In Progress)     Topic: Physical Therapy (In Progress)     Point: Mobility training (In Progress)     Learning Progress Summary           Patient Acceptance, E,TB, VU,NR by EE at 4/19/2022 1200    Acceptance, E,TB, VU,NR by EE at 4/18/2022 1552    Acceptance, E,TB,D, VU,NR by EJ at 4/17/2022 1621    Acceptance, E, NR by LH at 4/14/2022 1025    Acceptance, E, NR by CF at 4/12/2022 1349    Acceptance, E, VU by DB at 4/10/2022 1614    Acceptance, E,TB, NR by MS at 4/7/2022 1535   Family Acceptance, E,TB,D, VU,NR by EJ at 4/17/2022 1621    Acceptance, E, VU by DB at 4/10/2022 1614   Significant Other Acceptance, E,TB, NR by MS at 4/7/2022 1535                    Point: Home exercise program (In Progress)     Learning Progress Summary           Patient Acceptance, E,TB, VU,NR by EE at 4/19/2022 1200    Acceptance, E,TB, VU,NR by EE at 4/18/2022 1552    Acceptance, E,TB,D, VU,NR by EJ at 4/17/2022 1621    Acceptance, E, NR by LH at 4/14/2022 1025    Acceptance, E, NR by CF at 4/12/2022 1349    Acceptance, E, VU by DB at 4/10/2022 1614    Acceptance, E,TB, NR by MS at 4/7/2022 1535   Family Acceptance, E,TB,D, VU,NR by EJ at 4/17/2022 1621    Acceptance, E, VU by DB at 4/10/2022 1614   Significant Other Acceptance, E,TB, NR by MS at 4/7/2022 1535                   Point: Body mechanics (In Progress)     Learning Progress Summary           Patient Acceptance, E,TB, VU,NR by EE at 4/19/2022 1200    Acceptance, E,TB, VU,NR by EE at 4/18/2022 1552    Acceptance, E, NR by LH at 4/14/2022 1025    Acceptance, E, NR by CF at 4/12/2022 1349    Acceptance, E, VU by DB at 4/10/2022 1614    Acceptance, E,TB, NR by MS at 4/7/2022 1535   Family Acceptance, E, VU by DB at 4/10/2022 1614   Significant Other Acceptance, E,TB, NR by MS at 4/7/2022 1535                   Point: Precautions (In Progress)     Learning Progress Summary           Patient Acceptance, E,TB, VU,NR by EE at 4/19/2022 1200    Acceptance, E,TB, VU,NR by EE at 4/18/2022 1552    Acceptance, E, NR by LH at 4/14/2022 1025    Acceptance, E, NR by CF at 4/12/2022 1349    Acceptance, E, VU by DB at 4/10/2022 1614    Acceptance, E,TB, NR by MS at 4/7/2022 1535   Family Acceptance, E, VU by DB at 4/10/2022 1614   Significant Other Acceptance, E,TB, NR by MS at 4/7/2022 1535                               User Key     Initials Effective Dates Name Provider Type Discipline     06/16/21 -  Sandi Shepard, PT Physical Therapist PT    EE 06/16/21 -  Sanjuanita Soto, PT Physical Therapist PT    EJ 06/16/21 -  Lorri Knox, PT Physical Therapist PT    MS 06/16/21 -  Goldie Abrams, PT Physical Therapist PT    DB  06/16/21 -  Angelica Pierson, PT Physical Therapist PT    CF 06/16/21 -  Rhoda Antunez, PT Physical Therapist PT              PT Recommendation and Plan     Plan of Care Reviewed With: patient  Progress: improving  Outcome Evaluation: Pt continues to demonstrate good progress with therapy and was able to tolerate increased activity and more time in standing today. Continues to be limited by proximal LLE weakness and needs L knee blocked in stance to prevent buckling; however, is needed less assistance for transfers, balance, and gait. Pt remains motivated and is eager to transfer to acute rehab when able. Will continue to benefit from PT for ongoing strengthening and mobility training.     Time Calculation:    PT Charges     Row Name 04/19/22 1200             Time Calculation    Start Time 1031  -EE      Stop Time 1042  -EE      Time Calculation (min) 11 min  -EE      PT Received On 04/19/22  -EE      PT - Next Appointment 04/20/22  -EE              Time Calculation- PT    Total Timed Code Minutes- PT 11 minute(s)  -EE              Timed Charges    33099 - PT Therapeutic Activity Minutes 11  -EE              Total Minutes    Timed Charges Total Minutes 11  -EE       Total Minutes 11  -EE            User Key  (r) = Recorded By, (t) = Taken By, (c) = Cosigned By    Initials Name Provider Type    EE aSnjuanita Soto, PT Physical Therapist              Therapy Charges for Today     Code Description Service Date Service Provider Modifiers Qty    90998530061 HC PT THERAPEUTIC ACT EA 15 MIN 4/18/2022 Sanjuanita Soto, PT GP 1    [id number]  PT THER SUPP EA 15 MIN 4/18/2022 Sanjuanita Soto, PT GP 1    [id number] HC PT THERAPEUTIC ACT EA 15 MIN 4/19/2022 Sanjuanita Soto, PT GP 1          PT G-Codes  Outcome Measure Options: AM-PAC 6 Clicks Basic Mobility (PT)  AM-PAC 6 Clicks Score (PT): 14  AM-PAC 6 Clicks Score (OT): 15     Patient was not wearing a face mask during this therapy encounter. Therapist used appropriate personal  protective equipment including mask and gloves.  Mask used was standard procedure mask. Appropriate PPE was worn during the entire therapy session. Hand hygiene was completed before and after therapy session. Patient is not in enhanced droplet precautions.       Sanjuanita Soto, PT  4/19/2022

## 2022-04-19 NOTE — PAYOR COMM NOTE
"Laura Xie (86 y.o. Male)     PLEASE SEE ATTACHED FOR INPT CONTINUED STAY.     REF#[id number]    PLEASE CALL   OR  [phone]    THANK YOU    NAVDEEP LIEBERMAN LPN CCP            Date of Birth   1935    Social Security Number       Address   [address] [place] [address]    Home Phone   [phone]    MRN   [id number]       Scientology   Congregation    Marital Status                               Admission Date   4/5/22    Admission Type   Emergency    Admitting Provider   Bowen Coughlin MD    Attending Provider   Leobardo Juarez MD    Department, Room/Bed   [address], [address]       Discharge Date       Discharge Disposition       Discharge Destination                               Attending Provider: Leobardo Juarez MD    Allergies: Other, Penicillins, Percocet [Oxycodone-acetaminophen]    Isolation: None   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)   Code Status: CPR   Advance Care Planning Activity    Ht: 182.9 cm (72\")   Wt: 68 kg (150 lb)    Admission Cmt: None   Principal Problem: Hematoma of iliopsoas muscle, left, initial encounter [S70.12XA]                 Active Insurance as of 4/5/2022     Primary Coverage     Payor Plan Insurance Group Employer/Plan Group    ANTHEM BLUE CROSS ANTHEM BLUE CROSS BLUE SHIELD PPO [id number]     Payor Plan Address Payor Plan Phone Number Payor Plan Fax Number Effective Dates    [address] [phone]  1/1/2022 - None Entered    [facility] [id number]       Subscriber Name Subscriber Birth Date Member ID       PATRICIA XIE 7/11/1955 [id number]           Secondary Coverage     Payor Plan Insurance Group Employer/Plan Group    MEDICARE MEDICARE A & B      Payor Plan Address Payor Plan Phone Number Payor Plan Fax Number Effective Dates    [address] [phone]  11/1/2002 - None Entered    [facility] [address]       Subscriber Name Subscriber Birth Date Member ID       LAURA XIE 1935 " [id number]                 Emergency Contacts      (Rel.) Home Phone Work Phone Mobile Phone    Gladys Sequeira (Spouse) [phone] -- [phone]    Bruce Silva (Son) [phone] -- [phone]            Oxygen Therapy (last day)     Date/Time SpO2 Device (Oxygen Therapy) Flow (L/min) Oxygen Concentration (%) ETCO2 (mmHg)    22 0027 -- room air -- -- --    22 2250 98 room air -- -- --    22 2048 -- room air -- -- --    22 99 room air -- -- --    22 1331 100 room air -- -- --    22 0700 100 room air -- -- --    22 0011 -- room air -- -- --    22 0002 99 room air -- -- --          Intake & Output (last day)        0701   0700  0701   0700    P.O. 820     Total Intake(mL/kg) 820 (12.1)     Net +820           Urine Unmeasured Occurrence 5 x     Stool Unmeasured Occurrence 1 x         Lines, Drains & Airways     Active LDAs     Name Placement date Placement time Site Days    Peripheral IV 22 1620 Left Hand 22  1620  Hand  9                Operative/Procedure Notes (last 24 hours)  Notes from 22 0704 through 22 0704   No notes of this type exist for this encounter.            Physician Progress Notes (last 24 hours)      Leobardo Juarez MD at 22 1247          DAILY PROGRESS NOTE  [facility]    Patient Identification:  Name: Francisco Sequeira  Age: 86 y.o.  Sex: male  :  1935  MRN: [id number]         Primary Care Physician: Rubin Hinkle APRN    Subjective:  Interval History:His right leg is weak.   He is weak.    Objective:    Scheduled Meds:atorvastatin, 40 mg, Oral, Daily  digoxin, 62.5 mcg, Oral, Daily  enoxaparin, 1 mg/kg, Subcutaneous, BID  famotidine, 20 mg, Oral, BID AC  ferrous sulfate, 325 mg, Oral, Every Other Day  insulin glargine, 15 Units, Subcutaneous, Nightly  insulin lispro, 0-9 Units, Subcutaneous, TID AC  lactobacillus acidophilus, 1 capsule, Oral,  "Daily  magnesium oxide, 400 mg, Oral, BID  metoprolol succinate XL, 12.5 mg, Oral, Daily  sodium chloride, 10 mL, Intravenous, Q12H  warfarin, 7.5 mg, Oral, Daily      Continuous Infusions:Pharmacy to dose warfarin,         Vital signs in last 24 hours:  Temp:  [97.4 °F (36.3 °C)-97.5 °F (36.4 °C)] 97.5 °F (36.4 °C)  Heart Rate:  [66-88] 78  Resp:  [16] 16  BP: ()/(54-65) 118/65    Intake/Output:    Intake/Output Summary (Last 24 hours) at 4/18/2022 1247  Last data filed at 4/17/2022 2156  Gross per 24 hour   Intake 60 ml   Output --   Net 60 ml       Exam:  /65 (BP Location: Right arm, Patient Position: Lying)   Pulse 78   Temp 97.5 °F (36.4 °C) (Oral)   Resp 16   Ht 182.9 cm (72\")   Wt 68 kg (150 lb)   SpO2 100%   BMI 20.34 kg/m²     General Appearance:    Alert, cooperative, no distress   Head:    Normocephalic, without obvious abnormality, atraumatic   Eyes:       Throat:   Lips, tongue, gums normal   Neck:   Supple, symmetrical, trachea midline, no JVD   Lungs:     Clear to auscultation bilaterally, respirations unlabored   Chest Wall:    No tenderness or deformity    Heart:    Regular rate and rhythm, S1 and S2 normal, no murmur,no  Rub or gallop   Abdomen:     Soft, nontender, bowel sounds active, no masses, no organomegaly    Extremities:   Extremities normal, atraumatic, no cyanosis or edema   Pulses:      Skin:   Skin is warm and dry,  no rashes or palpable lesions   Neurologic:   Right leg weakness      Lab Results (last 72 hours)     Procedure Component Value Units Date/Time    Digoxin Level [[id number]]  (Abnormal) Collected: 04/13/22 0652    Specimen: Blood Updated: 04/13/22 1121     Digoxin 0.50 ng/mL     POC Glucose Once [[id number]]  (Abnormal) Collected: 04/13/22 1116    Specimen: Blood Updated: 04/13/22 1119     Glucose 144 mg/dL      Comment: Meter: [id number] : [id number] Alyssa ALEJO       Basic Metabolic Panel [[id number]]  (Abnormal) Collected: 04/13/22 0652    " Specimen: Blood Updated: 04/13/22 0750     Glucose 105 mg/dL      BUN 14 mg/dL      Creatinine 0.91 mg/dL      Sodium 136 mmol/L      Potassium 4.4 mmol/L      Chloride 105 mmol/L      CO2 24.0 mmol/L      Calcium 8.3 mg/dL      BUN/Creatinine Ratio 15.4     Anion Gap 7.0 mmol/L      eGFR 83.1 mL/min/1.73      Comment: National Kidney Foundation and American Society of Nephrology (ASN) Task Force recommended calculation based on the Chronic Kidney Disease Epidemiology Collaboration (CKD-EPI) equation refit without adjustment for race.       Narrative:      GFR Normal >60  Chronic Kidney Disease <60  Kidney Failure <15      Phosphorus [[id number]]  (Normal) Collected: 04/13/22 0652    Specimen: Blood Updated: 04/13/22 0750     Phosphorus 3.1 mg/dL     Magnesium [[id number]]  (Normal) Collected: 04/13/22 0652    Specimen: Blood Updated: 04/13/22 0750     Magnesium 2.3 mg/dL     Body Fluid Culture - Aspirate, Psoas [[id number]] Collected: 04/11/22 1305    Specimen: Aspirate from Psoas Updated: 04/13/22 0734     Body Fluid Culture No growth at 2 days     Gram Stain Few (2+) WBCs per low power field      No organisms seen    CBC (No Diff) [[id number]]  (Abnormal) Collected: 04/13/22 0652    Specimen: Blood Updated: 04/13/22 0716     WBC 4.12 10*3/mm3      RBC 3.51 10*6/mm3      Hemoglobin 9.3 g/dL      Hematocrit 29.8 %      MCV 84.9 fL      MCH 26.5 pg      MCHC 31.2 g/dL      RDW 16.3 %      RDW-SD 50.6 fl      MPV 10.7 fL      Platelets 259 10*3/mm3     POC Glucose Once [[id number]]  (Normal) Collected: 04/13/22 0639    Specimen: Blood Updated: 04/13/22 0640     Glucose 101 mg/dL      Comment: Meter: [id number] : [id number] Bee ALEJO       POC Glucose Once [[id number]]  (Normal) Collected: 04/13/22 0236    Specimen: Blood Updated: 04/13/22 0237     Glucose 104 mg/dL      Comment: Meter: [id number] : [id number] Jason Sweeney RN       POC Glucose Once [[id number]]  (Abnormal) Collected: 04/12/22 1950     Specimen: Blood Updated: 04/12/22 1951     Glucose 143 mg/dL      Comment: Meter: [id number] : [id number] Bee Jose MELO       POC Glucose Once [[id number]]  (Normal) Collected: 04/12/22 1610    Specimen: Blood Updated: 04/12/22 1612     Glucose 108 mg/dL      Comment: Meter: [id number] : [id number] Abhinav ALEJO       Basic Metabolic Panel [[id number]]  (Abnormal) Collected: 04/12/22 1157    Specimen: Blood Updated: 04/12/22 1233     Glucose 159 mg/dL      BUN 16 mg/dL      Creatinine 1.19 mg/dL      Sodium 134 mmol/L      Potassium 4.6 mmol/L      Chloride 102 mmol/L      CO2 24.7 mmol/L      Calcium 8.3 mg/dL      BUN/Creatinine Ratio 13.4     Anion Gap 7.3 mmol/L      eGFR 60.2 mL/min/1.73      Comment: National Kidney Foundation and American Society of Nephrology (ASN) Task Force recommended calculation based on the Chronic Kidney Disease Epidemiology Collaboration (CKD-EPI) equation refit without adjustment for race.       Narrative:      GFR Normal >60  Chronic Kidney Disease <60  Kidney Failure <15      Phosphorus [[id number]]  (Normal) Collected: 04/12/22 1157    Specimen: Blood Updated: 04/12/22 1233     Phosphorus 3.1 mg/dL     Magnesium [[id number]]  (Normal) Collected: 04/12/22 1157    Specimen: Blood Updated: 04/12/22 1233     Magnesium 2.2 mg/dL     CBC (No Diff) [[id number]]  (Abnormal) Collected: 04/12/22 1157    Specimen: Blood Updated: 04/12/22 1211     WBC 5.56 10*3/mm3      RBC 3.33 10*6/mm3      Hemoglobin 8.7 g/dL      Hematocrit 28.5 %      MCV 85.6 fL      MCH 26.1 pg      MCHC 30.5 g/dL      RDW 16.1 %      RDW-SD 49.5 fl      MPV 10.9 fL      Platelets 273 10*3/mm3     POC Glucose Once [[id number]]  (Abnormal) Collected: 04/12/22 1106    Specimen: Blood Updated: 04/12/22 1108     Glucose 154 mg/dL      Comment: Meter: [id number] : [id number] Abhinav ALEJO       POC Glucose Once [[id number]]  (Normal) Collected: 04/12/22 0621    Specimen: Blood Updated: 04/12/22 0622     Glucose  92 mg/dL      Comment: Meter: [id number] : [id number] Markos Membrenoaia NA       POC Glucose Once [[id number]]  (Abnormal) Collected: 04/12/22 0602    Specimen: Blood Updated: 04/12/22 0604     Glucose 37 mg/dL      Comment: RN Notified R and V Meter: [id number] : [id number] Markos Nataia NA       POC Glucose Once [[id number]]  (Abnormal) Collected: 04/12/22 0601    Specimen: Blood Updated: 04/12/22 0602     Glucose 33 mg/dL      Comment: Repeat Test Meter: [id number] : [id number] Burrows Nataia NA       POC Glucose Once [[id number]]  (Normal) Collected: 04/11/22 1936    Specimen: Blood Updated: 04/11/22 1938     Glucose 125 mg/dL      Comment: Meter: [id number] : [id number] Burrows Nataia NA       POC Glucose Once [[id number]]  (Abnormal) Collected: 04/11/22 1610    Specimen: Blood Updated: 04/11/22 1611     Glucose 172 mg/dL      Comment: RN Notified R and V Meter: [id number] : [id number] Sen Miley NA       Basic Metabolic Panel [[id number]]  (Abnormal) Collected: 04/11/22 1123    Specimen: Blood Updated: 04/11/22 1246     Glucose 103 mg/dL      BUN 13 mg/dL      Creatinine 1.15 mg/dL      Sodium 136 mmol/L      Potassium 4.5 mmol/L      Chloride 103 mmol/L      CO2 26.0 mmol/L      Calcium 8.0 mg/dL      BUN/Creatinine Ratio 11.3     Anion Gap 7.0 mmol/L      eGFR 62.8 mL/min/1.73      Comment: National Kidney Foundation and American Society of Nephrology (ASN) Task Force recommended calculation based on the Chronic Kidney Disease Epidemiology Collaboration (CKD-EPI) equation refit without adjustment for race.       Narrative:      GFR Normal >60  Chronic Kidney Disease <60  Kidney Failure <15      Magnesium [[id number]]  (Normal) Collected: 04/11/22 1123    Specimen: Blood Updated: 04/11/22 1246     Magnesium 2.1 mg/dL     Phosphorus [[id number]]  (Normal) Collected: 04/11/22 1123    Specimen: Blood Updated: 04/11/22 1246     Phosphorus 3.3 mg/dL     CBC (No Diff) [[id number]]  (Abnormal) Collected:  04/11/22 1123    Specimen: Blood Updated: 04/11/22 1230     WBC 5.50 10*3/mm3      RBC 3.28 10*6/mm3      Hemoglobin 8.8 g/dL      Hematocrit 28.2 %      MCV 86.0 fL      MCH 26.8 pg      MCHC 31.2 g/dL      RDW 15.9 %      RDW-SD 49.4 fl      MPV 11.3 fL      Platelets 288 10*3/mm3     POC Glucose Once [[id number]]  (Normal) Collected: 04/11/22 1103    Specimen: Blood Updated: 04/11/22 1105     Glucose 93 mg/dL      Comment: Meter: [id number] : [id number] Sen Trent NA       POC Glucose Once [[id number]]  (Normal) Collected: 04/11/22 0537    Specimen: Blood Updated: 04/11/22 0538     Glucose 98 mg/dL      Comment: Meter: [id number] : [id number] Ceasar Gladys NA       POC Glucose Once [[id number]]  (Normal) Collected: 04/11/22 0241    Specimen: Blood Updated: 04/11/22 0242     Glucose 97 mg/dL      Comment: Meter: [id number] : [id number] Ceasar Gladys NA       POC Glucose Once [[id number]]  (Normal) Collected: 04/10/22 1949    Specimen: Blood Updated: 04/10/22 1950     Glucose 113 mg/dL      Comment: Meter: [id number] : [id number] Jason Sweeney RN       POC Glucose Once [[id number]]  (Abnormal) Collected: 04/10/22 1602    Specimen: Blood Updated: 04/10/22 1603     Glucose 159 mg/dL      Comment: Meter: [id number] : [id number] Leon Reyes Clausia NA           Data Review:  Results from last 7 days   Lab Units 04/18/22  0721 04/17/22  0838 04/16/22  1014   SODIUM mmol/L 138 137 135*   POTASSIUM mmol/L 4.7 4.6 4.5   CHLORIDE mmol/L 105 105 104   CO2 mmol/L 26.4 24.5 21.0*   BUN mg/dL 16 17 14   CREATININE mg/dL 1.19 1.29* 1.18   GLUCOSE mg/dL 124* 157* 175*   CALCIUM mg/dL 8.4* 8.3* 8.3*     Results from last 7 days   Lab Units 04/18/22  0721 04/17/22  0838 04/16/22  1014   WBC 10*3/mm3 3.15* 3.99 7.19   HEMOGLOBIN g/dL 9.2* 9.0* 10.0*   HEMATOCRIT % 29.4* 29.4* 32.4*   PLATELETS 10*3/mm3 214 214 259             Lab Results   Lab Value Date/Time    TROPONINT 0.030 04/06/2022 0002    TROPONINT 0.018  03/30/2022 1625    TROPONINT 0.027 02/18/2022 2305    TROPONINT 0.011 10/23/2021 1617    TROPONINT 0.017 07/15/2021 2029    TROPONINT <0.010 01/13/2020 0747    TROPONINT <0.010 03/30/2017 0809               Invalid input(s): PROT, LABALBU          Glucose   Date/Time Value Ref Range Status   04/18/2022 1142 199 (H) 70 - 130 mg/dL Final     Comment:     RN Notified R and V Meter: [id number] : [id number] Sen Trent NA   04/18/2022 0611 123 70 - 130 mg/dL Final     Comment:     Meter: [id number] : [id number] Ceasar Gladys NA   04/17/2022 2035 155 (H) 70 - 130 mg/dL Final     Comment:     Meter: [id number] : [id number] Ceasar Gladys NA   04/17/2022 1559 183 (H) 70 - 130 mg/dL Final     Comment:     Meter: [id number] : [id number] Anurag Henryn NA   04/17/2022 1116 158 (H) 70 - 130 mg/dL Final     Comment:     Meter: [id number] : [id number] Anurag Baughtyn NA   04/17/2022 0627 129 70 - 130 mg/dL Final     Comment:     Meter: [id number] : [id number] Pavan Garcia NA   04/16/2022 2054 150 (H) 70 - 130 mg/dL Final     Comment:     Meter: [id number] : [id number] Benlana Amezcua NA   04/16/2022 1605 148 (H) 70 - 130 mg/dL Final     Comment:     Meter: [id number] : [id number] Isaias ALEJO     Results from last 7 days   Lab Units 04/18/22  0721 04/17/22  0838 04/16/22  1014   INR  1.57* 1.44* 1.20*       Past Medical History:   Diagnosis Date   • Allergic rhinitis    • Aortic valve insufficiency    • Ascending aortic aneurysm (HCC)    • Atrial fibrillation (HCC)    • Bacteremia    • Calcific aortic stenosis of bicuspid valve    • Cardiac arrest (HCC)    • Cardiomyopathy (HCC)    • CKD (chronic kidney disease) stage 3, GFR 30-59 ml/min (HCC)    • Contact dermatitis due to poison ivy    • Elbow fracture    • Esophageal reflux    • GERD (gastroesophageal reflux disease)    • Head injury    • History of transfusion    • Hyperglycemia    • Hyperlipidemia    • Hypertension    • Kidney  carcinoma (HCC)    • Nonischemic cardiomyopathy (HCC)    • Renal oncocytoma    • Seasonal allergic reaction    • Sinusitis    • Syncope    • Type 2 diabetes mellitus (HCC)     uncontrolled   • Visual field defect        Assessment:  Active Hospital Problems    Diagnosis  POA   • **Hematoma of iliopsoas muscle, left, initial encounter [S70.12XA]  Yes   • History of CVA (cerebrovascular accident) [Z86.73]  Not Applicable   • S/P laparoscopic cholecystectomy [Z90.49]  Not Applicable   • Anemia [D64.9]  Yes   • Chronic anticoagulation [Z79.01]  Not Applicable   • Stage 3b chronic kidney disease (HCC) [N18.32]  Yes   • H/O heart valve replacement with mechanical valve [Z95.2]  Not Applicable   • Type 2 diabetes mellitus (HCC) [E11.9]  Yes   • Hypertension [I10]  Yes   • Atrial fibrillation (HCC) [I48.91]  Yes      Resolved Hospital Problems   No resolved problems to display.       Plan:  Continue with current RX as per cardiology.  Continue coumadin  Will need  acute rehab for rehab. Follow lab.  DC planning.  Acute rehab soon Await PreCert..    Leobardo Juarez MD  2022  12:47 EDT    Electronically signed by Leobardo Juarez MD at 22 1249     Grififn Fried MD at 22 0936              Hospital Follow Up    LOS:  LOS: 12 days   Patient Name: Francisco Sequeira  Age/Sex: 86 y.o. male  : 1935  MRN: [id number]    Day of Service: 22   Length of Stay: 12  Encounter Provider: Griffin Fried MD  Place of Service: [facility] CARDIOLOGY  Patient Care Team:  Rubin Hinkle APRN as PCP - General (Family Medicine)  Audra Vazquez RPH as Pharmacist  Vasquez Niño PharmD as Pharmacist (Pharmacy)  Tatiana Tinoco MD as Consulting Physician (Gastroenterology)    Subjective:     Chief Complaint: Atrial fibrillation/aortic valve replacement    Interval History: Patient still weak.  He is overall doing better.  Fortunately he has not a lot of pain.    Objective:      Objective:  Temp:  [97.4 °F (36.3 °C)-97.5 °F (36.4 °C)] 97.5 °F (36.4 °C)  Heart Rate:  [66-88] 78  Resp:  [16] 16  BP: ()/(54-65) 118/65     Intake/Output Summary (Last 24 hours) at 4/18/2022 0936  Last data filed at 4/17/2022 2156  Gross per 24 hour   Intake 60 ml   Output --   Net 60 ml     Body mass index is 20.34 kg/m².      04/06/22  0047 04/08/22  1241 04/11/22  1202   Weight: 70.8 kg (156 lb) 68.5 kg (151 lb 0.2 oz) 68 kg (150 lb)     Weight change:       Physical Exam:   General : Alert, cooperative, in no acute distress.  Neuro: Alert,cooperative and oriented.  Lungs: CTAB. Normal respiratory effort and rate.  CV: Artificial click  ABD: Soft, nontender, nondistended. Positive bowel sounds.  Extr: No edema or cyanosis, moves all extremities.    Lab Review:   Results from last 7 days   Lab Units 04/18/22  0721 04/17/22  0838   SODIUM mmol/L 138 137   POTASSIUM mmol/L 4.7 4.6   CHLORIDE mmol/L 105 105   CO2 mmol/L 26.4 24.5   BUN mg/dL 16 17   CREATININE mg/dL 1.19 1.29*   GLUCOSE mg/dL 124* 157*   CALCIUM mg/dL 8.4* 8.3*         Results from last 7 days   Lab Units 04/18/22  0721 04/17/22  0838   WBC 10*3/mm3 3.15* 3.99   HEMOGLOBIN g/dL 9.2* 9.0*   HEMATOCRIT % 29.4* 29.4*   PLATELETS 10*3/mm3 214 214     Results from last 7 days   Lab Units 04/18/22  0721 04/17/22  0838   INR  1.57* 1.44*     Results from last 7 days   Lab Units 04/18/22  0721 04/17/22  0838   MAGNESIUM mg/dL 2.4 2.3           Invalid input(s): LDLCALC            Current Medications:   Scheduled Meds:atorvastatin, 40 mg, Oral, Daily  digoxin, 62.5 mcg, Oral, Daily  enoxaparin, 1 mg/kg, Subcutaneous, BID  famotidine, 20 mg, Oral, BID AC  ferrous sulfate, 325 mg, Oral, Every Other Day  insulin glargine, 15 Units, Subcutaneous, Nightly  insulin lispro, 0-9 Units, Subcutaneous, TID AC  lactobacillus acidophilus, 1 capsule, Oral, Daily  magnesium oxide, 400 mg, Oral, BID  metoprolol succinate XL, 12.5 mg, Oral, Daily  sodium  chloride, 10 mL, Intravenous, Q12H  warfarin, 7.5 mg, Oral, Daily      Continuous Infusions:Pharmacy to dose warfarin,         Allergies:  Allergies   Allergen Reactions   • Other Other (See Comments)     Grass, ragweed   • Penicillins Swelling   • Percocet [Oxycodone-Acetaminophen] Nausea And Vomiting       Assessment:       Hematoma of iliopsoas muscle, left, initial encounter    Atrial fibrillation (HCC)    Hypertension    Type 2 diabetes mellitus (HCC)    H/O heart valve replacement with mechanical valve    Stage 3b chronic kidney disease (HCC)    Chronic anticoagulation    History of CVA (cerebrovascular accident)    S/P laparoscopic cholecystectomy    Anemia        Plan:   1.  Spontaneous left iliopsoas hematoma secondary to anticoagulation  2.  Mechanical aortic valve replacement  3.  Prior CVA with residual left-sided weakness (INR at 2.2)  4.  Chronic anticoagulation with goal INR of 3 - 3.5  5.  History of lower GI bleed with hematochezia in November 2021  6.  Right rectus sheath hematoma in November 2021  7.  Persistent atrial fibrillation  8.  History of renal cell carcinoma and right nephrectomy   9.  Chronic kidney disease  10.  Recurrent right pleural effusion, status post multiple thoracenteses  11.  Multifactorial anemia  12.  Diabetes  13.  Deconditioning and weakness  14.  Multiple pauses noted on telemetry of up to 5.8 seconds.  Patient remains asymptomatic.  15.  At this point we will see patient on as-needed basis.  His INR slow to increase.  Agree with Zaira the fact he had a stroke on the lower INR at 2.2.           Griffin Fried MD  04/18/22  09:36 EDT        Electronically signed by Griffin Fried MD at 04/18/22 1138       Consult Notes (last 24 hours)  Notes from 04/18/22 0704 through 04/19/22 0704   No notes of this type exist for this encounter.

## 2022-04-20 ENCOUNTER — HOSPITAL ENCOUNTER (INPATIENT)
Facility: HOSPITAL | Age: 87
LOS: 28 days | Discharge: HOME | End: 2022-05-18
Attending: PHYSICAL MEDICINE & REHABILITATION | Admitting: PHYSICAL MEDICINE & REHABILITATION

## 2022-04-20 VITALS
BODY MASS INDEX: 20.32 KG/M2 | WEIGHT: 150 LBS | RESPIRATION RATE: 20 BRPM | SYSTOLIC BLOOD PRESSURE: 117 MMHG | OXYGEN SATURATION: 100 % | TEMPERATURE: 98 F | DIASTOLIC BLOOD PRESSURE: 70 MMHG | HEIGHT: 72 IN | HEART RATE: 83 BPM

## 2022-04-20 DIAGNOSIS — E11.65 UNCONTROLLED TYPE 2 DIABETES MELLITUS WITH HYPERGLYCEMIA: ICD-10-CM

## 2022-04-20 DIAGNOSIS — Z74.09 IMPAIRED FUNCTIONAL MOBILITY, BALANCE, GAIT, AND ENDURANCE: Primary | ICD-10-CM

## 2022-04-20 PROBLEM — S70.12XA HEMATOMA OF LEFT ILIOPSOAS MUSCLE: Status: ACTIVE | Noted: 2022-04-20

## 2022-04-20 LAB
ANION GAP SERPL CALCULATED.3IONS-SCNC: 5.3 MMOL/L (ref 5–15)
BUN SERPL-MCNC: 16 MG/DL (ref 8–23)
BUN/CREAT SERPL: 14.3 (ref 7–25)
CALCIUM SPEC-SCNC: 8.4 MG/DL (ref 8.6–10.5)
CHLORIDE SERPL-SCNC: 106 MMOL/L (ref 98–107)
CO2 SERPL-SCNC: 26.7 MMOL/L (ref 22–29)
CREAT SERPL-MCNC: 1.12 MG/DL (ref 0.76–1.27)
DEPRECATED RDW RBC AUTO: 47.6 FL (ref 37–54)
EGFRCR SERPLBLD CKD-EPI 2021: 64.8 ML/MIN/1.73
ERYTHROCYTE [DISTWIDTH] IN BLOOD BY AUTOMATED COUNT: 15.8 % (ref 12.3–15.4)
GLUCOSE BLDC GLUCOMTR-MCNC: 102 MG/DL (ref 70–130)
GLUCOSE BLDC GLUCOMTR-MCNC: 130 MG/DL (ref 70–130)
GLUCOSE BLDC GLUCOMTR-MCNC: 181 MG/DL (ref 70–130)
GLUCOSE BLDC GLUCOMTR-MCNC: 196 MG/DL (ref 70–130)
GLUCOSE BLDC GLUCOMTR-MCNC: 75 MG/DL (ref 70–130)
GLUCOSE SERPL-MCNC: 70 MG/DL (ref 65–99)
HCT VFR BLD AUTO: 30.5 % (ref 37.5–51)
HGB BLD-MCNC: 9.7 G/DL (ref 13–17.7)
INR PPP: 2.07 (ref 0.9–1.1)
MAGNESIUM SERPL-MCNC: 2.3 MG/DL (ref 1.6–2.4)
MCH RBC QN AUTO: 26.6 PG (ref 26.6–33)
MCHC RBC AUTO-ENTMCNC: 31.8 G/DL (ref 31.5–35.7)
MCV RBC AUTO: 83.8 FL (ref 79–97)
PHOSPHATE SERPL-MCNC: 2.9 MG/DL (ref 2.5–4.5)
PLATELET # BLD AUTO: 219 10*3/MM3 (ref 140–450)
PMV BLD AUTO: 11 FL (ref 6–12)
POTASSIUM SERPL-SCNC: 4.4 MMOL/L (ref 3.5–5.2)
PROTHROMBIN TIME: 23.3 SECONDS (ref 11.7–14.2)
RBC # BLD AUTO: 3.64 10*6/MM3 (ref 4.14–5.8)
SARS-COV-2 RNA RESP QL NAA+PROBE: NOT DETECTED
SODIUM SERPL-SCNC: 138 MMOL/L (ref 136–145)
WBC NRBC COR # BLD: 3.28 10*3/MM3 (ref 3.4–10.8)

## 2022-04-20 PROCEDURE — 97530 THERAPEUTIC ACTIVITIES: CPT

## 2022-04-20 PROCEDURE — 85027 COMPLETE CBC AUTOMATED: CPT | Performed by: STUDENT IN AN ORGANIZED HEALTH CARE EDUCATION/TRAINING PROGRAM

## 2022-04-20 PROCEDURE — 80048 BASIC METABOLIC PNL TOTAL CA: CPT | Performed by: STUDENT IN AN ORGANIZED HEALTH CARE EDUCATION/TRAINING PROGRAM

## 2022-04-20 PROCEDURE — 97110 THERAPEUTIC EXERCISES: CPT

## 2022-04-20 PROCEDURE — 25010000002 ENOXAPARIN PER 10 MG: Performed by: PHYSICAL MEDICINE & REHABILITATION

## 2022-04-20 PROCEDURE — 97116 GAIT TRAINING THERAPY: CPT

## 2022-04-20 PROCEDURE — 82962 GLUCOSE BLOOD TEST: CPT

## 2022-04-20 PROCEDURE — U0003 INFECTIOUS AGENT DETECTION BY NUCLEIC ACID (DNA OR RNA); SEVERE ACUTE RESPIRATORY SYNDROME CORONAVIRUS 2 (SARS-COV-2) (CORONAVIRUS DISEASE [COVID-19]), AMPLIFIED PROBE TECHNIQUE, MAKING USE OF HIGH THROUGHPUT TECHNOLOGIES AS DESCRIBED BY CMS-2020-01-R: HCPCS | Performed by: HOSPITALIST

## 2022-04-20 PROCEDURE — 83735 ASSAY OF MAGNESIUM: CPT | Performed by: STUDENT IN AN ORGANIZED HEALTH CARE EDUCATION/TRAINING PROGRAM

## 2022-04-20 PROCEDURE — 25010000002 ENOXAPARIN PER 10 MG: Performed by: HOSPITALIST

## 2022-04-20 PROCEDURE — 85610 PROTHROMBIN TIME: CPT | Performed by: NURSE PRACTITIONER

## 2022-04-20 PROCEDURE — 84100 ASSAY OF PHOSPHORUS: CPT | Performed by: STUDENT IN AN ORGANIZED HEALTH CARE EDUCATION/TRAINING PROGRAM

## 2022-04-20 RX ORDER — L.ACID,PARA/B.BIFIDUM/S.THERM 8B CELL
1 CAPSULE ORAL DAILY
Status: CANCELLED | OUTPATIENT
Start: 2022-04-21 | End: 2022-05-04

## 2022-04-20 RX ORDER — DIGOXIN 0.06 MG/1
62.5 TABLET ORAL
Start: 2022-04-21 | End: 2022-07-05 | Stop reason: SDUPTHER

## 2022-04-20 RX ORDER — ATORVASTATIN CALCIUM 20 MG/1
40 TABLET, FILM COATED ORAL DAILY
Status: CANCELLED | OUTPATIENT
Start: 2022-04-21

## 2022-04-20 RX ORDER — INSULIN LISPRO 100 [IU]/ML
0-9 INJECTION, SOLUTION INTRAVENOUS; SUBCUTANEOUS
Status: CANCELLED | OUTPATIENT
Start: 2022-04-20

## 2022-04-20 RX ORDER — METOPROLOL SUCCINATE 25 MG/1
12.5 TABLET, EXTENDED RELEASE ORAL DAILY
Status: DISCONTINUED | OUTPATIENT
Start: 2022-04-21 | End: 2022-05-18 | Stop reason: HOSPADM

## 2022-04-20 RX ORDER — MAGNESIUM OXIDE 400 MG/1
400 TABLET ORAL 2 TIMES DAILY
Status: DISCONTINUED | OUTPATIENT
Start: 2022-04-20 | End: 2022-05-18 | Stop reason: HOSPADM

## 2022-04-20 RX ORDER — NYSTATIN 100000 [USP'U]/G
POWDER TOPICAL EVERY 12 HOURS SCHEDULED
Status: ON HOLD
Start: 2022-04-20 | End: 2022-04-21

## 2022-04-20 RX ORDER — ATORVASTATIN CALCIUM 20 MG/1
40 TABLET, FILM COATED ORAL DAILY
Status: DISCONTINUED | OUTPATIENT
Start: 2022-04-21 | End: 2022-05-18 | Stop reason: HOSPADM

## 2022-04-20 RX ORDER — DIGOXIN 125 MCG
62.5 TABLET ORAL
Status: DISCONTINUED | OUTPATIENT
Start: 2022-04-21 | End: 2022-05-18 | Stop reason: HOSPADM

## 2022-04-20 RX ORDER — INSULIN LISPRO 100 [IU]/ML
0-9 INJECTION, SOLUTION INTRAVENOUS; SUBCUTANEOUS
Refills: 12 | Status: ON HOLD
Start: 2022-04-20 | End: 2022-04-21

## 2022-04-20 RX ORDER — ACETAMINOPHEN 325 MG/1
650 TABLET ORAL EVERY 4 HOURS PRN
Status: CANCELLED | OUTPATIENT
Start: 2022-04-20

## 2022-04-20 RX ORDER — WARFARIN SODIUM 7.5 MG/1
7.5 TABLET ORAL
Status: DISCONTINUED | OUTPATIENT
Start: 2022-04-21 | End: 2022-04-21

## 2022-04-20 RX ORDER — ACETAMINOPHEN 325 MG/1
650 TABLET ORAL EVERY 4 HOURS PRN
Status: ON HOLD
Start: 2022-04-20 | End: 2022-04-21

## 2022-04-20 RX ORDER — ACETAMINOPHEN 650 MG/1
650 SUPPOSITORY RECTAL EVERY 4 HOURS PRN
Status: CANCELLED | OUTPATIENT
Start: 2022-04-20

## 2022-04-20 RX ORDER — ONDANSETRON 2 MG/ML
4 INJECTION INTRAMUSCULAR; INTRAVENOUS EVERY 6 HOURS PRN
Status: CANCELLED | OUTPATIENT
Start: 2022-04-20

## 2022-04-20 RX ORDER — ACETAMINOPHEN 325 MG/1
650 TABLET ORAL EVERY 4 HOURS PRN
Status: DISCONTINUED | OUTPATIENT
Start: 2022-04-20 | End: 2022-05-18 | Stop reason: HOSPADM

## 2022-04-20 RX ORDER — NITROGLYCERIN 0.4 MG/1
0.4 TABLET SUBLINGUAL
Status: CANCELLED | OUTPATIENT
Start: 2022-04-20

## 2022-04-20 RX ORDER — FAMOTIDINE 20 MG/1
20 TABLET, FILM COATED ORAL
Status: DISCONTINUED | OUTPATIENT
Start: 2022-04-20 | End: 2022-04-27

## 2022-04-20 RX ORDER — DIPHENHYDRAMINE HCL 25 MG
25 CAPSULE ORAL 2 TIMES DAILY PRN
Status: DISCONTINUED | OUTPATIENT
Start: 2022-04-20 | End: 2022-05-18

## 2022-04-20 RX ORDER — ACETAMINOPHEN 160 MG/5ML
650 SOLUTION ORAL EVERY 4 HOURS PRN
Status: CANCELLED | OUTPATIENT
Start: 2022-04-20

## 2022-04-20 RX ORDER — INSULIN LISPRO 100 [IU]/ML
0-9 INJECTION, SOLUTION INTRAVENOUS; SUBCUTANEOUS
Status: DISCONTINUED | OUTPATIENT
Start: 2022-04-20 | End: 2022-05-18 | Stop reason: HOSPADM

## 2022-04-20 RX ORDER — DIPHENHYDRAMINE HCL 25 MG
25 CAPSULE ORAL 2 TIMES DAILY PRN
Status: CANCELLED | OUTPATIENT
Start: 2022-04-20

## 2022-04-20 RX ORDER — NITROGLYCERIN 0.4 MG/1
0.4 TABLET SUBLINGUAL
Status: DISCONTINUED | OUTPATIENT
Start: 2022-04-20 | End: 2022-05-18 | Stop reason: HOSPADM

## 2022-04-20 RX ORDER — NICOTINE POLACRILEX 4 MG
15 LOZENGE BUCCAL
Status: CANCELLED | OUTPATIENT
Start: 2022-04-20

## 2022-04-20 RX ORDER — NYSTATIN 100000 [USP'U]/G
POWDER TOPICAL EVERY 12 HOURS SCHEDULED
Status: CANCELLED | OUTPATIENT
Start: 2022-04-20

## 2022-04-20 RX ORDER — ONDANSETRON 2 MG/ML
4 INJECTION INTRAMUSCULAR; INTRAVENOUS EVERY 6 HOURS PRN
Status: DISCONTINUED | OUTPATIENT
Start: 2022-04-20 | End: 2022-05-18

## 2022-04-20 RX ORDER — DIGOXIN 125 MCG
62.5 TABLET ORAL
Status: CANCELLED | OUTPATIENT
Start: 2022-04-21

## 2022-04-20 RX ORDER — MAGNESIUM OXIDE 400 MG/1
400 TABLET ORAL 2 TIMES DAILY
Status: CANCELLED | OUTPATIENT
Start: 2022-04-20

## 2022-04-20 RX ORDER — FAMOTIDINE 20 MG/1
20 TABLET, FILM COATED ORAL
Status: CANCELLED | OUTPATIENT
Start: 2022-04-20 | End: 2022-05-04

## 2022-04-20 RX ORDER — METOPROLOL SUCCINATE 25 MG/1
12.5 TABLET, EXTENDED RELEASE ORAL DAILY
Status: CANCELLED | OUTPATIENT
Start: 2022-04-21

## 2022-04-20 RX ORDER — L.ACID,PARA/B.BIFIDUM/S.THERM 8B CELL
1 CAPSULE ORAL DAILY
Status: COMPLETED | OUTPATIENT
Start: 2022-04-21 | End: 2022-05-03

## 2022-04-20 RX ORDER — DEXTROSE MONOHYDRATE 25 G/50ML
25 INJECTION, SOLUTION INTRAVENOUS
Status: CANCELLED | OUTPATIENT
Start: 2022-04-20

## 2022-04-20 RX ORDER — CALCIUM CARBONATE 200(500)MG
2 TABLET,CHEWABLE ORAL 2 TIMES DAILY PRN
Status: DISCONTINUED | OUTPATIENT
Start: 2022-04-20 | End: 2022-05-18

## 2022-04-20 RX ORDER — NYSTATIN 100000 [USP'U]/G
POWDER TOPICAL EVERY 12 HOURS SCHEDULED
Status: DISCONTINUED | OUTPATIENT
Start: 2022-04-20 | End: 2022-05-18 | Stop reason: HOSPADM

## 2022-04-20 RX ORDER — ACETAMINOPHEN 650 MG/1
650 SUPPOSITORY RECTAL EVERY 4 HOURS PRN
Status: DISCONTINUED | OUTPATIENT
Start: 2022-04-20 | End: 2022-04-20

## 2022-04-20 RX ORDER — WARFARIN SODIUM 10 MG/1
10 TABLET ORAL
Status: COMPLETED | OUTPATIENT
Start: 2022-04-20 | End: 2022-04-20

## 2022-04-20 RX ORDER — CALCIUM CARBONATE 200(500)MG
2 TABLET,CHEWABLE ORAL 2 TIMES DAILY PRN
Status: CANCELLED | OUTPATIENT
Start: 2022-04-20

## 2022-04-20 RX ORDER — FERROUS SULFATE 325(65) MG
325 TABLET ORAL EVERY OTHER DAY
Status: CANCELLED | OUTPATIENT
Start: 2022-04-22

## 2022-04-20 RX ORDER — FERROUS SULFATE 325(65) MG
325 TABLET ORAL EVERY OTHER DAY
Status: DISCONTINUED | OUTPATIENT
Start: 2022-04-22 | End: 2022-05-18 | Stop reason: HOSPADM

## 2022-04-20 RX ORDER — DEXTROSE MONOHYDRATE 25 G/50ML
25 INJECTION, SOLUTION INTRAVENOUS
Status: DISCONTINUED | OUTPATIENT
Start: 2022-04-20 | End: 2022-05-18

## 2022-04-20 RX ORDER — ONDANSETRON 4 MG/1
4 TABLET, FILM COATED ORAL EVERY 6 HOURS PRN
Status: DISCONTINUED | OUTPATIENT
Start: 2022-04-20 | End: 2022-05-18

## 2022-04-20 RX ORDER — ONDANSETRON 4 MG/1
4 TABLET, FILM COATED ORAL EVERY 6 HOURS PRN
Status: CANCELLED | OUTPATIENT
Start: 2022-04-20

## 2022-04-20 RX ORDER — NICOTINE POLACRILEX 4 MG
15 LOZENGE BUCCAL
Status: DISCONTINUED | OUTPATIENT
Start: 2022-04-20 | End: 2022-05-18

## 2022-04-20 RX ORDER — ACETAMINOPHEN 160 MG/5ML
650 SOLUTION ORAL EVERY 4 HOURS PRN
Status: DISCONTINUED | OUTPATIENT
Start: 2022-04-20 | End: 2022-04-20

## 2022-04-20 RX ORDER — WARFARIN SODIUM 7.5 MG/1
7.5 TABLET ORAL
Status: CANCELLED | OUTPATIENT
Start: 2022-04-20

## 2022-04-20 RX ADMIN — METOPROLOL SUCCINATE 12.5 MG: 25 TABLET, EXTENDED RELEASE ORAL at 08:24

## 2022-04-20 RX ADMIN — FAMOTIDINE 20 MG: 20 TABLET ORAL at 18:04

## 2022-04-20 RX ADMIN — ENOXAPARIN SODIUM 70 MG: 100 INJECTION SUBCUTANEOUS at 08:24

## 2022-04-20 RX ADMIN — FAMOTIDINE 20 MG: 20 TABLET ORAL at 08:24

## 2022-04-20 RX ADMIN — ENOXAPARIN SODIUM 70 MG: 100 INJECTION SUBCUTANEOUS at 22:24

## 2022-04-20 RX ADMIN — MAGNESIUM OXIDE 400 MG (241.3 MG MAGNESIUM) TABLET 400 MG: TABLET at 08:24

## 2022-04-20 RX ADMIN — FERROUS SULFATE TAB 325 MG (65 MG ELEMENTAL FE) 325 MG: 325 (65 FE) TAB at 08:25

## 2022-04-20 RX ADMIN — Medication 10 ML: at 08:25

## 2022-04-20 RX ADMIN — ATORVASTATIN CALCIUM 40 MG: 20 TABLET, FILM COATED ORAL at 08:25

## 2022-04-20 RX ADMIN — NYSTATIN: 100000 POWDER TOPICAL at 08:25

## 2022-04-20 RX ADMIN — DIGOXIN 62.5 MCG: 125 TABLET ORAL at 12:00

## 2022-04-20 RX ADMIN — Medication 1 CAPSULE: at 08:25

## 2022-04-20 RX ADMIN — INSULIN GLARGINE-YFGN 15 UNITS: 100 INJECTION, SOLUTION SUBCUTANEOUS at 22:24

## 2022-04-20 RX ADMIN — MAGNESIUM OXIDE 400 MG (241.3 MG MAGNESIUM) TABLET 400 MG: TABLET at 22:23

## 2022-04-20 RX ADMIN — WARFARIN 10 MG: 10 TABLET ORAL at 18:04

## 2022-04-20 RX ADMIN — NYSTATIN: 100000 POWDER TOPICAL at 22:30

## 2022-04-20 NOTE — PROGRESS NOTES
[facility] has been following for home care needs.  Noted patient will be transferring to [facility] and our liaison will continue to follow along for possible home health needs.  Thank you.

## 2022-04-20 NOTE — PLAN OF CARE
Goal Outcome Evaluation:  Plan of Care Reviewed With: patient, spouse        Progress: improving  Outcome Evaluation: Pt seen for OT tx session this AM, contiues to stay motivated and participate in all sessions. Pt progressing with functional ambulation this date, STS with CGA and able to increase distance into hallway, simluating proper household distances, CGA at CGA belt while PT blocked L knee with min A. Pt required multiple cues for proper carryover with advancement of Rwx throughout mobility task. Pt engaged in functional reaching task UIC to improve upper trunk control required to support self optimally throughout ADLs and completed BUE ther ex 2x20 in functional planes with good end range. Pt will continue to benefit from skilled OT.    Therapist in mask, gloves and hand hygiene performed.

## 2022-04-20 NOTE — PLAN OF CARE
Goal Outcome Evaluation:  Plan of Care Reviewed With: patient        Progress: improving  Outcome Evaluation: Possible DC soon.  Up to chair at times.  INC at times.  Meds whole in apple sauce.

## 2022-04-20 NOTE — DISCHARGE SUMMARY
PHYSICIAN DISCHARGE SUMMARY                                                                        [facility]    Patient Identification:  Name: Francisco Sequeira  Age: 86 y.o.  Sex: male  :  1935  MRN: [id number]  Primary Care Physician: Rubin Hinkle APRN    Admit date: 2022  Discharge date and time:2022  Discharged Condition: good    Discharge Diagnoses:  Active Hospital Problems    Diagnosis  POA   • **Hematoma of iliopsoas muscle, left, initial encounter [S70.12XA]  Yes   • History of CVA (cerebrovascular accident) [Z86.73]  Not Applicable   • S/P laparoscopic cholecystectomy [Z90.49]  Not Applicable   • Anemia [D64.9]  Yes   • Chronic anticoagulation [Z79.01]  Not Applicable   • Stage 3b chronic kidney disease (HCC) [N18.32]  Yes   • H/O heart valve replacement with mechanical valve [Z95.2]  Not Applicable   • Type 2 diabetes mellitus (HCC) [E11.9]  Yes   • Hypertension [I10]  Yes   • Atrial fibrillation (HCC) [I48.91]  Yes      Resolved Hospital Problems   No resolved problems to display.          PMHX:   Past Medical History:   Diagnosis Date   • Allergic rhinitis    • Aortic valve insufficiency    • Ascending aortic aneurysm (HCC)    • Atrial fibrillation (HCC)    • Bacteremia    • Calcific aortic stenosis of bicuspid valve    • Cardiac arrest (HCC)    • Cardiomyopathy (HCC)    • CKD (chronic kidney disease) stage 3, GFR 30-59 ml/min (HCC)    • Contact dermatitis due to poison ivy    • Elbow fracture    • Esophageal reflux    • GERD (gastroesophageal reflux disease)    • Head injury    • History of transfusion    • Hyperglycemia    • Hyperlipidemia    • Hypertension    • Kidney carcinoma (HCC)    • Nonischemic cardiomyopathy (HCC)    • Renal oncocytoma    • Seasonal allergic reaction    • Sinusitis    • Syncope    • Type 2 diabetes mellitus (HCC)     uncontrolled   • Visual field defect      PSHX:   Past  Surgical History:   Procedure Laterality Date   • AORTIC VALVE REPAIR/REPLACEMENT     • ASCENDING AORTIC ANEURYSM REPAIR W/ MECHANICAL AORTIC VALVE REPLACEMENT     • CHOLECYSTECTOMY WITH INTRAOPERATIVE CHOLANGIOGRAM N/A 3/29/2022    Procedure: Laparoscopic cholecystectomy with cholangiogram, possible open;  Surgeon: Lisa Guidry MD;  Location: [facility] MAIN OR;  Service: General;  Laterality: N/A;   • COLONOSCOPY N/A 10/28/2021    Procedure: COLONOSCOPY to cecum:  cold snare polyps,;  Surgeon: Dinesh Meza MD;  Location: [facility] ENDOSCOPY;  Service: Gastroenterology;  Laterality: N/A;  pre:  Iron deficiency anemia  post:  polyps, diverticulosis,    • COLONOSCOPY N/A 11/9/2021    Procedure: COLONOSCOPY to cecum with APC cautery of AVM and clip placement x1;  Surgeon: Pavan Rodriguez MD;  Location: [facility] ENDOSCOPY;  Service: Gastroenterology;  Laterality: N/A;  PRE - gi bleed, anemia  POST - diverticulosis, poor prep, AVM right colon   • ENDOSCOPY N/A 10/28/2021    Procedure: ESOPHAGOGASTRODUODENOSCOPY with biopsies;  Surgeon: Dinesh Meza MD;  Location: [facility] ENDOSCOPY;  Service: Gastroenterology;  Laterality: N/A;  pre:  Iron deficiency anemia  post:  duodenitis and gastritis   • EYE SURGERY  12/09/2020    cataract surgery    • NEPHRECTOMY     • OTHER SURGICAL HISTORY      elbow surgery   • OTHER SURGICAL HISTORY      right arm surgery   • PROSTATE SURGERY     • THORACENTESIS Left     diagnostic       Hospital Course: Francisco Sequeiar  is a 86 y.o. male former smoker with a history of valvular heart disease, afib, chronic AC, CKD, DM, HTN, NICM, and multiple medical issues that presents to [facility] complaining of LLE weakness, swelling. He was discharged from [facility] 4/4/22 following hospitalization for acute cholecystitis s/p lap cholecystectomy 3/29/22 and aspiration pneumonia. He was discharged on lovenox bridge and warfarin for subtherapeutic INR. He was doing well as far  as walking around at discharge. Yesterday he began feeling weak in his LLE, having difficulty walking, and was unable to get up off of the toilet. Denies severe pain but has had lower leg edema that is uncomfortable. His wife reports his INR to be 1.3. Pt denies fever, chest pain, shortness of breath, n/v/d, dysuria, back pain, numbness/tingling.        The patient was admitted to hospital and seen by multiple consultants.  Patient had hematoma of the left iliopsoas muscle.  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  There was attempt to aspirate hematoma but were not able to obtain any material.  The patient was still very weak and after being in the hospital for couple weeks plan is to go to acute rehab for strengthening.  Follow-up with his primary care after released from rehab.  He is resumed Coumadin INR goal is still 3-3.5 since he has had previous stroke history with mechanical valve.    Consults:     Consults     Date and Time Order Name Status Description    4/15/2022 12:29 PM Inpatient Physical Medicine Rehab Consult      4/13/2022  2:27 AM Inpatient Cardiology Consult      4/8/2022  3:30 PM Inpatient Orthopedic Surgery Consult Completed     4/6/2022  3:27 AM Inpatient Orthopedic Surgery Consult Completed     4/6/2022  3:27 AM Inpatient Cardiology Consult Completed     4/6/2022  2:52 AM [facility] (on-call MD unless specified) Details      3/24/2022  9:21 AM Inpatient Infectious Diseases Consult Completed     3/24/2022  9:21 AM Inpatient Pulmonology Consult Completed     3/23/2022  1:38 PM Inpatient Cardiology Consult Completed     3/23/2022  1:32 PM Inpatient General Surgery Consult Completed     3/23/2022 10:39 AM Surgery (on-call MD unless specified) Completed         Results from last 7 days   Lab Units 04/20/22  0710   WBC 10*3/mm3 3.28*   HEMOGLOBIN g/dL 9.7*   HEMATOCRIT % 30.5*   PLATELETS 10*3/mm3 219     Results from last 7 days   Lab Units 04/20/22  0710   SODIUM mmol/L 138    POTASSIUM mmol/L 4.4   CHLORIDE mmol/L 106   CO2 mmol/L 26.7   BUN mg/dL 16   CREATININE mg/dL 1.12   GLUCOSE mg/dL 70   CALCIUM mg/dL 8.4*     Significant Diagnostic Studies:   WBC   Date Value Ref Range Status   04/20/2022 3.28 (L) 3.40 - 10.80 10*3/mm3 Final     Hemoglobin   Date Value Ref Range Status   04/20/2022 9.7 (L) 13.0 - 17.7 g/dL Final     Hematocrit   Date Value Ref Range Status   04/20/2022 30.5 (L) 37.5 - 51.0 % Final     Platelets   Date Value Ref Range Status   04/20/2022 219 140 - 450 10*3/mm3 Final     Sodium   Date Value Ref Range Status   04/20/2022 138 136 - 145 mmol/L Final     Potassium   Date Value Ref Range Status   04/20/2022 4.4 3.5 - 5.2 mmol/L Final     Chloride   Date Value Ref Range Status   04/20/2022 106 98 - 107 mmol/L Final     CO2   Date Value Ref Range Status   04/20/2022 26.7 22.0 - 29.0 mmol/L Final     BUN   Date Value Ref Range Status   04/20/2022 16 8 - 23 mg/dL Final     Creatinine   Date Value Ref Range Status   04/20/2022 1.12 0.76 - 1.27 mg/dL Final     Glucose   Date Value Ref Range Status   04/20/2022 70 65 - 99 mg/dL Final     Calcium   Date Value Ref Range Status   04/20/2022 8.4 (L) 8.6 - 10.5 mg/dL Final     Magnesium   Date Value Ref Range Status   04/20/2022 2.3 1.6 - 2.4 mg/dL Final     Phosphorus   Date Value Ref Range Status   04/20/2022 2.9 2.5 - 4.5 mg/dL Final     No results found for: AST, ALT, ALKPHOS  INR   Date Value Ref Range Status   04/20/2022 2.07 (H) 0.90 - 1.10 Final     No results found for: COLORU, CLARITYU, SPECGRAV, PHUR, PROTEINUR, GLUCOSEU, KETONESU, BLOODU, NITRITE, LEUKOCYTESUR, BILIRUBINUR, UROBILINOGEN, RBCUA, WBCUA, BACTERIA, UACOMMENT  No results found for: TROPONINT, TROPONINI, BNP  No components found for: HGBA1C;2  No components found for: TSH;2  Imaging Results (All)     Procedure Component Value Units Date/Time    CT Guided Biopsy Aspiration Injection [[id number]] Collected: 04/11/22 1356     Updated: 04/11/22 1401     Narrative:      PROCEDURE: CT guided left iliopsoas muscle aspiration     HISTORY: Psoas hematoma/ femoral nerve palsy; S70.12XA-Contusion of left  thigh, initial encounter; Z79.01-Long term (current) use of  anticoagulants; Z95.2-Presence of prosthetic heart valve;  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     The procedural risks, benefits, and alternatives were discussed with the  patient. Informed consent was obtained.        The patient was placed in the supine position on the CT procedure table.  Axial CT scan was performed to localize the hematoma in the left  iliopsoas muscle. The overlying skin was prepped and draped in the usual  sterile fashion. 1% buffered lidocaine was used for local anesthesia.     Next, a 5 German Yueh needle was advanced into the collection under CT  guidance. Approximately 1 mL of thick bloody fluid was then aspirated  and sent for requested labs. The needle was removed and sterile dressing  applied. No immediate complications.       Impression:      CT-guided left iliopsoas muscle hematoma aspiration yielded about 1 mL  of thick bloody fluid, nonpurulent. Sample sent for requested labs.                           Radiation dose reduction techniques were utilized, including automated  exposure control and exposure modulation based on body size.     This report was finalized on 4/11/2022 1:58 PM by Dr. Manuel Mclean M.D.       CT Lower Extremity Left Without Contrast [[id number]] Collected: 04/08/22 1312     Updated: 04/08/22 1338    Narrative:      CT LEFT THIGH WITHOUT CONTRAST     HISTORY: Left lower extremity hematoma. Spontaneous hemorrhage.     TECHNIQUE: CT includes axial imaging through the left thigh from the  level of the top of  femoral head to the distal femoral shaft and this  data was reconstructed in coronal and sagittal planes. No contrast  administered.      COMPARISON: CT left lower extremity without contrast 04/06/2022.     FINDINGS: There is soft tissue edema with thickening involving the  subcutaneous fat extending lateral to the left proximal femur extending  along the left thigh. This is nonspecific and may in part represent  hemorrhage.     The distal left iliopsoas muscle is abnormal and contains areas of mixed  internal hyperdensity consistent with hemorrhage into the left iliopsoas  muscle. This could be associated with an avulsion of the left iliopsoas  tendon. No avulsed lesser trochanteric fragment is evident. There is  mild edema extending between the musculature of the left hip and  proximal thigh. There is calcification associated with the left proximal  rectus femoris muscle associated with partial chronic tear or avulsion.  Atherosclerotic calcifications are present involving the iliac and  femoral vasculature.       Impression:      1. Abnormal distal left iliopsoas muscle with areas of mixed internal  increased density representing hemorrhage within the muscle and this may  be associated with a left iliopsoas tear or avulsion. This is new when  compared to the previous CT 03/23/2022. (This anatomy not covered on the  previous CT left lower extremity 04/06/2022).  2. Generalized edema within the subcutaneous fat about the left hip and  thigh greatest involving the lateral subcutaneous fat where there is  swelling. No evidence for organized hematoma within the lateral hip or  thigh soft tissues.  3. Old partial tear or avulsion of the left rectus femoris muscle.     Radiation dose reduction techniques were utilized, including automated  exposure control and exposure modulation based on body size.     This report was finalized on 4/8/2022 1:35 PM by Dr. Julian Forrester M.D.       CT Lumbar Spine Without Contrast  [[id number]] Collected: 04/06/22 0227     Updated: 04/06/22 0246    Narrative:      CT OF THE LUMBAR SPINE     HISTORY: Left lower extremity weakness     COMPARISON: 03/23/2022     TECHNIQUE: Axial CT imaging was obtained through the lumbar spine.  Coronal and sagittal reformatted images were obtained.     FINDINGS:  No acute fracture or subluxation of the lumbar spine is identified.  Intervertebral disc spaces appear relatively well-maintained. Lumbar  vertebral body alignment appears within normal limits.     T12-L1: There is no canal stenosis or neural foraminal narrowing.  L1-L2: There is broad-based disc bulge. This results in narrowing of the  canal, as well as bilateral neural foraminal narrowing, right greater  than left.  L2-L3: There is broad-based disc bulge. This results in canal narrowing,  as well as neural foraminal narrowing, right greater than left.  L3-L4: Disc bulge results in canal stenosis. There is bilateral neural  foraminal narrowing.  L4-L5: There is broad-based disc bulge. There is canal stenosis. There  is bilateral neural foraminal narrowing, left greater than right.  L5-S1: There is no canal stenosis or neural foraminal narrowing.        This patient has stranding which is seen posterior to the left psoas  muscle, which is new when compared to prior exam. There is also  asymmetric enlargement of the patient's left iliacus muscle, with  displacement of the left psoas muscle anteriorly. Appearance is  concerning for hematoma, given history of anticoagulation, although this  is incompletely assessed on this exam. Centrally, there is more focal  area of decreased attenuation. This could reflect some liquefying  hematoma, but correlation with any evidence of infection is recommended.  There is additional fluid and stranding which is seen within the left  perivesical space. There is some free fluid within the pelvis within the  right hemipelvis. The patient is noted to have a partially  visualized  moderate right pleural effusion. There also is some trace pleural fluid  on the left.       Impression:         1. No acute osseous findings.  2. Degenerative changes in the spine, as noted above.  3. Asymmetric enlargement of the patient's left iliacus and left  iliopsoas muscle, incompletely assessed on this exam. Given history of  anticoagulation, this may represent hemorrhage. There are areas of more  central low attenuation, which could reflect hematoma liquefaction, but  correlation with any evidence of infection is recommended. The  hemorrhage does abut the patient's left psoas muscle as well, and there  is also some stranding and fluid seen within the left perivesical space.     FINDINGS were discussed with Dr. Dubose at 2:40 AM.     Radiation dose reduction techniques were utilized, including automated  exposure control and exposure modulation based on body size.     This report was finalized on 4/6/2022 2:43 AM by Dr. Yue Frye M.D.       CT Lower Extremity Left Without Contrast [[id number]] Collected: 04/06/22 0219     Updated: 04/06/22 0230    Narrative:      CT OF THE LEFT LOWER EXTREMITY     HISTORY: Left leg weakness     COMPARISON: 04/05/2022     TECHNIQUE: Axial CT imaging was obtained through the left lower  extremity. Coronal and sagittal reformatted images were obtained.     FINDINGS:  No acute fracture or subluxation of the left knee is seen. There is  diffuse left lower extremity edema. There is a trace amount of patellar  fluid. There is really no significant stranding within the infrapatellar  bursa. Both the patellar and quadriceps tendon appear continuous. Dense  vascular calcifications are noted. The edema appears to be more  significant on the lateral aspect of the leg.       Impression:         1. No acute osseous findings.  2. Both the patellar tendon and quadriceps tendon appear to be intact.  If concern persists for soft tissue injury, consideration for MRI  is  suggested.  3. Diffuse left lower extremity edema.     Radiation dose reduction techniques were utilized, including automated  exposure control and exposure modulation based on body size.     This report was finalized on 4/6/2022 2:27 AM by Dr. Yue Frye M.D.       CT Head Without Contrast [[id number]] Collected: 04/06/22 0214     Updated: 04/06/22 0221    Narrative:      CT HEAD WITHOUT CONTRAST     HISTORY: Left knee weakness     COMPARISON: 10/23/2021     TECHNIQUE: Axial CT imaging was obtained through the brain. No IV  contrast was administered.     FINDINGS:  Images through the posterior fossa are degraded by streak artifact from  dental amalgam. No acute intracranial hemorrhage is seen. There is  diffuse atrophy. There is periventricular and deep white matter  microangiopathic change. No calvarial fracture is seen. Paranasal  sinuses and mastoid air cells appear clear.       Impression:      No acute intracranial findings.     Radiation dose reduction techniques were utilized, including automated  exposure control and exposure modulation based on body size.     This report was finalized on 4/6/2022 2:18 AM by Dr. Yue Frye M.D.       XR Knee 1 or 2 View Left [[id number]] Collected: 04/05/22 2053     Updated: 04/05/22 2057    Narrative:      XR KNEE 1 OR 2 VW LEFT-     Clinical: Left knee pain     FINDINGS: Medial and lateral compartments preserved. There is  patellofemoral joint narrowing. Vascular arterial calcifications within  the soft tissues. No bone lesion, osteochondral defect, fracture or  dislocation seen.     CONCLUSION: Patellofemoral joint degeneration. No acute osseous or  articular abnormality.     This report was finalized on 4/5/2022 8:54 PM by Dr. Adrian Brand M.D.           Lab Results (last 7 days)     Procedure Component Value Units Date/Time    COVID PRE-OP / PRE-PROCEDURE SCREENING ORDER (NO ISOLATION) - Swab, Nasopharynx [[id number]] Collected: 04/20/22 1201     Specimen: Swab from Nasopharynx Updated: 04/20/22 1209    Narrative:      The following orders were created for panel order COVID PRE-OP / PRE-PROCEDURE SCREENING ORDER (NO ISOLATION) - Swab, Nasopharynx.  Procedure                               Abnormality         Status                     ---------                               -----------         ------                     COVID-19,APTIMA PANTHER(...[[id number]]                      In process                   Please view results for these tests on the individual orders.    COVID-19,APTIMA PANTHER([facility]),[facility], NP/OP SWAB IN UTM/VTM/SALINE TRANSPORT MEDIA,24 HR TAT - Swab, Nasopharynx [[id number]] Collected: 04/20/22 1201    Specimen: Swab from Nasopharynx Updated: 04/20/22 1209    POC Glucose Once [[id number]]  (Normal) Collected: 04/20/22 1116    Specimen: Blood Updated: 04/20/22 1119     Glucose 130 mg/dL      Comment: Meter: [id number] : [id number] Anurag ALEJO       POC Glucose Once [[id number]]  (Abnormal) Collected: 04/20/22 0917    Specimen: Blood Updated: 04/20/22 0919     Glucose 196 mg/dL      Comment: Meter: [id number] : [id number] Anurag ALEJO       Basic Metabolic Panel [[id number]]  (Abnormal) Collected: 04/20/22 0710    Specimen: Blood Updated: 04/20/22 0851     Glucose 70 mg/dL      BUN 16 mg/dL      Creatinine 1.12 mg/dL      Sodium 138 mmol/L      Potassium 4.4 mmol/L      Chloride 106 mmol/L      CO2 26.7 mmol/L      Calcium 8.4 mg/dL      BUN/Creatinine Ratio 14.3     Anion Gap 5.3 mmol/L      eGFR 64.8 mL/min/1.73      Comment: National Kidney Foundation and American Society of Nephrology (ASN) Task Force recommended calculation based on the Chronic Kidney Disease Epidemiology Collaboration (CKD-EPI) equation refit without adjustment for race.       Narrative:      GFR Normal >60  Chronic Kidney Disease <60  Kidney Failure <15      Protime-INR [[id number]]  (Abnormal) Collected: 04/20/22 0710    Specimen: Blood from Arm,  Right Updated: 04/20/22 0846     Protime 23.3 Seconds      INR 2.07    Phosphorus [[id number]]  (Normal) Collected: 04/20/22 0710    Specimen: Blood Updated: 04/20/22 0836     Phosphorus 2.9 mg/dL     Magnesium [[id number]]  (Normal) Collected: 04/20/22 0710    Specimen: Blood Updated: 04/20/22 0836     Magnesium 2.3 mg/dL     CBC (No Diff) [[id number]]  (Abnormal) Collected: 04/20/22 0710    Specimen: Blood Updated: 04/20/22 0820     WBC 3.28 10*3/mm3      RBC 3.64 10*6/mm3      Hemoglobin 9.7 g/dL      Hematocrit 30.5 %      MCV 83.8 fL      MCH 26.6 pg      MCHC 31.8 g/dL      RDW 15.8 %      RDW-SD 47.6 fl      MPV 11.0 fL      Platelets 219 10*3/mm3     POC Glucose Once [[id number]]  (Normal) Collected: 04/20/22 0633    Specimen: Blood Updated: 04/20/22 0634     Glucose 75 mg/dL      Comment: Meter: [id number] : [id number] Bee Damon NA       POC Glucose Once [[id number]]  (Abnormal) Collected: 04/19/22 2107    Specimen: Blood Updated: 04/19/22 2108     Glucose 140 mg/dL      Comment: Meter: [id number] : [id number] Gamboa Damon NA       POC Glucose Once [[id number]]  (Normal) Collected: 04/19/22 1631    Specimen: Blood Updated: 04/19/22 1634     Glucose 80 mg/dL      Comment: Meter: [id number] : [id number] Anabella ALEJO       POC Glucose Once [[id number]]  (Abnormal) Collected: 04/19/22 1110    Specimen: Blood Updated: 04/19/22 1112     Glucose 174 mg/dL      Comment: Meter: [id number] : [id number] Anabella ALEJO       Basic Metabolic Panel [[id number]]  (Abnormal) Collected: 04/19/22 0723    Specimen: Blood Updated: 04/19/22 0848     Glucose 96 mg/dL      BUN 14 mg/dL      Creatinine 1.10 mg/dL      Sodium 137 mmol/L      Potassium 4.4 mmol/L      Chloride 105 mmol/L      CO2 27.2 mmol/L      Calcium 8.4 mg/dL      BUN/Creatinine Ratio 12.7     Anion Gap 4.8 mmol/L      eGFR 66.2 mL/min/1.73      Comment: National Kidney Foundation and American Society of Nephrology (ASN) Task Force recommended  calculation based on the Chronic Kidney Disease Epidemiology Collaboration (CKD-EPI) equation refit without adjustment for race.       Narrative:      GFR Normal >60  Chronic Kidney Disease <60  Kidney Failure <15      Phosphorus [[id number]]  (Normal) Collected: 04/19/22 0723    Specimen: Blood Updated: 04/19/22 0848     Phosphorus 3.0 mg/dL     Magnesium [[id number]]  (Normal) Collected: 04/19/22 0723    Specimen: Blood Updated: 04/19/22 0848     Magnesium 2.3 mg/dL     Protime-INR [[id number]]  (Abnormal) Collected: 04/19/22 0723    Specimen: Blood Updated: 04/19/22 0835     Protime 21.1 Seconds      INR 1.82    CBC (No Diff) [[id number]]  (Abnormal) Collected: 04/19/22 0723    Specimen: Blood Updated: 04/19/22 0824     WBC 3.10 10*3/mm3      RBC 3.56 10*6/mm3      Hemoglobin 9.3 g/dL      Hematocrit 29.2 %      MCV 82.0 fL      MCH 26.1 pg      MCHC 31.8 g/dL      RDW 15.9 %      RDW-SD 47.7 fl      MPV 10.9 fL      Platelets 217 10*3/mm3     POC Glucose Once [[id number]]  (Normal) Collected: 04/19/22 0609    Specimen: Blood Updated: 04/19/22 0610     Glucose 106 mg/dL      Comment: Meter: [id number] : [id number] Ceasar Gladys NA       POC Glucose Once [[id number]]  (Abnormal) Collected: 04/18/22 2018    Specimen: Blood Updated: 04/18/22 2020     Glucose 147 mg/dL      Comment: Meter: [id number] : [id number] Ceasar Gladys NA       POC Glucose Once [[id number]]  (Normal) Collected: 04/18/22 1559    Specimen: Blood Updated: 04/18/22 1601     Glucose 128 mg/dL      Comment: Meter: [id number] : [id number] Sen Miley NA       POC Glucose Once [[id number]]  (Abnormal) Collected: 04/18/22 1142    Specimen: Blood Updated: 04/18/22 1143     Glucose 199 mg/dL      Comment: RN Notified R and V Meter: [id number] : [id number] Sen Miley NA       Basic Metabolic Panel [[id number]]  (Abnormal) Collected: 04/18/22 0721    Specimen: Blood Updated: 04/18/22 0818     Glucose 124 mg/dL      BUN 16 mg/dL       Creatinine 1.19 mg/dL      Sodium 138 mmol/L      Potassium 4.7 mmol/L      Chloride 105 mmol/L      CO2 26.4 mmol/L      Calcium 8.4 mg/dL      BUN/Creatinine Ratio 13.4     Anion Gap 6.6 mmol/L      eGFR 60.2 mL/min/1.73      Comment: National Kidney Foundation and American Society of Nephrology (ASN) Task Force recommended calculation based on the Chronic Kidney Disease Epidemiology Collaboration (CKD-EPI) equation refit without adjustment for race.       Narrative:      GFR Normal >60  Chronic Kidney Disease <60  Kidney Failure <15      Phosphorus [[id number]]  (Normal) Collected: 04/18/22 0721    Specimen: Blood Updated: 04/18/22 0818     Phosphorus 3.0 mg/dL     Magnesium [[id number]]  (Normal) Collected: 04/18/22 0721    Specimen: Blood Updated: 04/18/22 0818     Magnesium 2.4 mg/dL     Protime-INR [[id number]]  (Abnormal) Collected: 04/18/22 0721    Specimen: Blood Updated: 04/18/22 0810     Protime 18.7 Seconds      INR 1.57    CBC (No Diff) [[id number]]  (Abnormal) Collected: 04/18/22 0721    Specimen: Blood Updated: 04/18/22 0802     WBC 3.15 10*3/mm3      RBC 3.54 10*6/mm3      Hemoglobin 9.2 g/dL      Hematocrit 29.4 %      MCV 83.1 fL      MCH 26.0 pg      MCHC 31.3 g/dL      RDW 15.7 %      RDW-SD 47.6 fl      MPV 11.0 fL      Platelets 214 10*3/mm3     POC Glucose Once [[id number]]  (Normal) Collected: 04/18/22 0611    Specimen: Blood Updated: 04/18/22 0613     Glucose 123 mg/dL      Comment: Meter: [id number] : [id number] Ceasar Gladys NA       POC Glucose Once [[id number]]  (Abnormal) Collected: 04/17/22 2035    Specimen: Blood Updated: 04/17/22 2036     Glucose 155 mg/dL      Comment: Meter: [id number] : [id number] Ceasar Gladys NA       POC Glucose Once [[id number]]  (Abnormal) Collected: 04/17/22 1559    Specimen: Blood Updated: 04/17/22 1601     Glucose 183 mg/dL      Comment: Meter: [id number] : [id number] Anurag ALEJO       POC Glucose Once [[id number]]  (Abnormal) Collected:  04/17/22 1116    Specimen: Blood Updated: 04/17/22 1118     Glucose 158 mg/dL      Comment: Meter: [id number] : [id number] Anurag Wing MELO       Protime-INR [[id number]]  (Abnormal) Collected: 04/17/22 0838    Specimen: Blood Updated: 04/17/22 0933     Protime 17.4 Seconds      INR 1.44    Basic Metabolic Panel [[id number]]  (Abnormal) Collected: 04/17/22 0838    Specimen: Blood Updated: 04/17/22 0925     Glucose 157 mg/dL      BUN 17 mg/dL      Creatinine 1.29 mg/dL      Sodium 137 mmol/L      Potassium 4.6 mmol/L      Chloride 105 mmol/L      CO2 24.5 mmol/L      Calcium 8.3 mg/dL      BUN/Creatinine Ratio 13.2     Anion Gap 7.5 mmol/L      eGFR 54.7 mL/min/1.73      Comment: National Kidney Foundation and American Society of Nephrology (ASN) Task Force recommended calculation based on the Chronic Kidney Disease Epidemiology Collaboration (CKD-EPI) equation refit without adjustment for race.       Narrative:      GFR Normal >60  Chronic Kidney Disease <60  Kidney Failure <15      Phosphorus [[id number]]  (Normal) Collected: 04/17/22 0838    Specimen: Blood Updated: 04/17/22 0925     Phosphorus 3.0 mg/dL     Magnesium [[id number]]  (Normal) Collected: 04/17/22 0838    Specimen: Blood Updated: 04/17/22 0925     Magnesium 2.3 mg/dL     CBC (No Diff) [[id number]]  (Abnormal) Collected: 04/17/22 0838    Specimen: Blood Updated: 04/17/22 0913     WBC 3.99 10*3/mm3      RBC 3.40 10*6/mm3      Hemoglobin 9.0 g/dL      Hematocrit 29.4 %      MCV 86.5 fL      MCH 26.5 pg      MCHC 30.6 g/dL      RDW 15.8 %      RDW-SD 49.5 fl      MPV 11.1 fL      Platelets 214 10*3/mm3     POC Glucose Once [[id number]]  (Normal) Collected: 04/17/22 0627    Specimen: Blood Updated: 04/17/22 0628     Glucose 129 mg/dL      Comment: Meter: [id number] : [id number] Pavan Garcia MELO       POC Glucose Once [[id number]]  (Abnormal) Collected: 04/16/22 2054    Specimen: Blood Updated: 04/16/22 2059     Glucose 150 mg/dL      Comment: Meter:  [id number] : [id number] Kade ALEJO       POC Glucose Once [[id number]]  (Abnormal) Collected: 04/16/22 1605    Specimen: Blood Updated: 04/16/22 1606     Glucose 148 mg/dL      Comment: Meter: [id number] : [id number] Isaias Magalie CARLOS ALEJO       Basic Metabolic Panel [[id number]]  (Abnormal) Collected: 04/16/22 1014    Specimen: Blood Updated: 04/16/22 1215     Glucose 175 mg/dL      BUN 14 mg/dL      Creatinine 1.18 mg/dL      Sodium 135 mmol/L      Potassium 4.5 mmol/L      Chloride 104 mmol/L      CO2 21.0 mmol/L      Calcium 8.3 mg/dL      BUN/Creatinine Ratio 11.9     Anion Gap 10.0 mmol/L      eGFR 60.8 mL/min/1.73      Comment: National Kidney Foundation and American Society of Nephrology (ASN) Task Force recommended calculation based on the Chronic Kidney Disease Epidemiology Collaboration (CKD-EPI) equation refit without adjustment for race.       Narrative:      GFR Normal >60  Chronic Kidney Disease <60  Kidney Failure <15      Phosphorus [[id number]]  (Normal) Collected: 04/16/22 1014    Specimen: Blood Updated: 04/16/22 1215     Phosphorus 2.7 mg/dL     Magnesium [[id number]]  (Normal) Collected: 04/16/22 1014    Specimen: Blood Updated: 04/16/22 1215     Magnesium 2.2 mg/dL     POC Glucose Once [[id number]]  (Abnormal) Collected: 04/16/22 1137    Specimen: Blood Updated: 04/16/22 1139     Glucose 177 mg/dL      Comment: Meter: [id number] : [id number] Debbi Nando MELO       Protime-INR [[id number]]  (Abnormal) Collected: 04/16/22 1014    Specimen: Blood Updated: 04/16/22 1137     Protime 15.1 Seconds      INR 1.20    CBC (No Diff) [[id number]]  (Abnormal) Collected: 04/16/22 1014    Specimen: Blood Updated: 04/16/22 1128     WBC 7.19 10*3/mm3      RBC 3.85 10*6/mm3      Hemoglobin 10.0 g/dL      Hematocrit 32.4 %      MCV 84.2 fL      MCH 26.0 pg      MCHC 30.9 g/dL      RDW 16.6 %      RDW-SD 50.9 fl      MPV 10.6 fL      Platelets 259 10*3/mm3     POC Glucose Once [[id number]]  (Normal)  Collected: 04/16/22 0604    Specimen: Blood Updated: 04/16/22 0614     Glucose 108 mg/dL      Comment: Meter: [id number] : [id number] Dave Najera MARCELLUS       POC Glucose Once [[id number]]  (Abnormal) Collected: 04/15/22 2058    Specimen: Blood Updated: 04/15/22 2059     Glucose 152 mg/dL      Comment: Meter: [id number] : [id number] Dave Castilloa MARCELLUS       Protime-INR [[id number]]  (Abnormal) Collected: 04/15/22 1723    Specimen: Blood Updated: 04/15/22 1758     Protime 15.0 Seconds      INR 1.18    POC Glucose Once [[id number]]  (Abnormal) Collected: 04/15/22 1612    Specimen: Blood Updated: 04/15/22 1613     Glucose 133 mg/dL      Comment: Meter: [id number] : [id number] Sen Corina NA       POC Glucose Once [[id number]]  (Abnormal) Collected: 04/15/22 1123    Specimen: Blood Updated: 04/15/22 1124     Glucose 193 mg/dL      Comment: Meter: [id number] : [id number] Debbi Seth MELO       Phosphorus [[id number]]  (Normal) Collected: 04/15/22 0806    Specimen: Blood Updated: 04/15/22 1002     Phosphorus 3.1 mg/dL     Basic Metabolic Panel [[id number]]  (Abnormal) Collected: 04/15/22 0806    Specimen: Blood Updated: 04/15/22 0850     Glucose 103 mg/dL      BUN 12 mg/dL      Creatinine 1.09 mg/dL      Sodium 137 mmol/L      Potassium 4.0 mmol/L      Chloride 104 mmol/L      CO2 26.5 mmol/L      Calcium 8.8 mg/dL      BUN/Creatinine Ratio 11.0     Anion Gap 6.5 mmol/L      eGFR 66.9 mL/min/1.73      Comment: National Kidney Foundation and American Society of Nephrology (ASN) Task Force recommended calculation based on the Chronic Kidney Disease Epidemiology Collaboration (CKD-EPI) equation refit without adjustment for race.       Narrative:      GFR Normal >60  Chronic Kidney Disease <60  Kidney Failure <15      Magnesium [[id number]]  (Normal) Collected: 04/15/22 0806    Specimen: Blood Updated: 04/15/22 0850     Magnesium 2.4 mg/dL     CBC (No Diff) [[id number]]  (Abnormal) Collected: 04/15/22 0806     Specimen: Blood Updated: 04/15/22 0833     WBC 4.22 10*3/mm3      RBC 3.85 10*6/mm3      Hemoglobin 10.0 g/dL      Hematocrit 31.6 %      MCV 82.1 fL      MCH 26.0 pg      MCHC 31.6 g/dL      RDW 16.1 %      RDW-SD 48.0 fl      MPV 10.8 fL      Platelets 273 10*3/mm3     POC Glucose Once [[id number]]  (Normal) Collected: 04/15/22 0615    Specimen: Blood Updated: 04/15/22 0617     Glucose 92 mg/dL      Comment: Meter: [id number] : [id number] Edgardo Min NA       POC Glucose Once [[id number]]  (Normal) Collected: 04/14/22 2002    Specimen: Blood Updated: 04/14/22 2003     Glucose 125 mg/dL      Comment: Meter: [id number] : GPHAILY Jordan RN       POC Glucose Once [[id number]]  (Normal) Collected: 04/14/22 1550    Specimen: Blood Updated: 04/14/22 1607     Glucose 94 mg/dL      Comment: Meter: [id number] : [id number] Alyssa Lawson NA       POC Glucose Once [[id number]]  (Abnormal) Collected: 04/14/22 1114    Specimen: Blood Updated: 04/14/22 1120     Glucose 181 mg/dL      Comment: Meter: [id number] : [id number] Alyssa Ramses Nerisa NA       Body Fluid Culture - Aspirate, Psoas [[id number]] Collected: 04/11/22 1305    Specimen: Aspirate from Psoas Updated: 04/14/22 1007     Body Fluid Culture No growth at 3 days     Gram Stain Few (2+) WBCs per low power field      No organisms seen    Phosphorus [[id number]]  (Normal) Collected: 04/14/22 0848    Specimen: Blood Updated: 04/14/22 0948     Phosphorus 2.8 mg/dL     Basic Metabolic Panel [[id number]]  (Abnormal) Collected: 04/14/22 0848    Specimen: Blood Updated: 04/14/22 0939     Glucose 159 mg/dL      BUN 14 mg/dL      Creatinine 1.01 mg/dL      Sodium 134 mmol/L      Potassium 4.1 mmol/L      Chloride 103 mmol/L      CO2 24.0 mmol/L      Calcium 8.2 mg/dL      BUN/Creatinine Ratio 13.9     Anion Gap 7.0 mmol/L      eGFR 73.3 mL/min/1.73      Comment: National Kidney Foundation and American Society of Nephrology (ASN) Task Force recommended  "calculation based on the Chronic Kidney Disease Epidemiology Collaboration (CKD-EPI) equation refit without adjustment for race.       Narrative:      GFR Normal >60  Chronic Kidney Disease <60  Kidney Failure <15      Magnesium [[id number]]  (Normal) Collected: 04/14/22 0848    Specimen: Blood Updated: 04/14/22 0939     Magnesium 2.2 mg/dL     CBC (No Diff) [[id number]]  (Abnormal) Collected: 04/14/22 0848    Specimen: Blood Updated: 04/14/22 0920     WBC 5.01 10*3/mm3      RBC 3.57 10*6/mm3      Hemoglobin 9.3 g/dL      Hematocrit 30.5 %      MCV 85.4 fL      MCH 26.1 pg      MCHC 30.5 g/dL      RDW 15.9 %      RDW-SD 49.0 fl      MPV 10.7 fL      Platelets 271 10*3/mm3     POC Glucose Once [[id number]]  (Normal) Collected: 04/14/22 0542    Specimen: Blood Updated: 04/14/22 0544     Glucose 81 mg/dL      Comment: Meter: [id number] : [id number] Ceasar Gladys NA       POC Glucose Once [[id number]]  (Abnormal) Collected: 04/13/22 2022    Specimen: Blood Updated: 04/13/22 2023     Glucose 190 mg/dL      Comment: Meter: [id number] : [id number] Ceasar Gladys NA       POC Glucose Once [[id number]]  (Abnormal) Collected: 04/13/22 1550    Specimen: Blood Updated: 04/13/22 1558     Glucose 164 mg/dL      Comment: Meter: [id number] : [id number] Alyssa ALEJO           /70 (BP Location: Right arm, Patient Position: Lying)   Pulse 83   Temp 98 °F (36.7 °C) (Oral)   Resp 20   Ht 182.9 cm (72\")   Wt 68 kg (150 lb)   SpO2 100%   BMI 20.34 kg/m²     Discharge Exam:  General Appearance:    Alert, cooperative, no distress                          Head:    Normocephalic, without obvious abnormality, atraumatic                          Eyes:                            Throat:   Lips, tongue, gums normal                          Neck:   Supple, symmetrical, trachea midline, no JVD                        Lungs:     Clear to auscultation bilaterally, respirations unlabored                Chest Wall:    No " tenderness or deformity                        Heart:    Regular rate and rhythm, S1 and S2 normal, no murmur,no  Rub or gallop                  Abdomen:     Soft, non-tender, bowel sounds active, no masses, no organomegaly                  Extremities:   Extremities normal, atraumatic, no cyanosis or edema                             Skin:   Skin is warm and dry,  no rashes or palpable lesions                  Neurologic: Left leg weakness     Disposition:  Rehab    Patient Instructions:      Discharge Medications      New Medications      Instructions Start Date   acetaminophen 325 MG tablet  Commonly known as: TYLENOL   650 mg, Oral, Every 4 Hours PRN      hydrocortisone-bacitracin-zinc oxide-nystatin  Commonly known as: MAGIC BARRIER   1 application, Topical, As Needed      insulin glargine 100 UNIT/ML injection  Commonly known as: LANTUS, SEMGLEE   15 Units, Subcutaneous, Nightly      nystatin 100735 UNIT/GM powder  Commonly known as: MYCOSTATIN   Topical, Every 12 Hours Scheduled         Changes to Medications      Instructions Start Date   Digoxin 62.5 MCG tablet  What changed:   · medication strength  · how much to take  · when to take this   62.5 mcg, Oral, Daily Digoxin   Start Date: April 21, 2022     enoxaparin 80 MG/0.8ML solution syringe  Commonly known as: LOVENOX  What changed: when to take this   1 mg/kg (70 mg), Subcutaneous, 2 Times Daily      insulin lispro 100 UNIT/ML injection  Commonly known as: ADMELOG  What changed:   · medication strength  · how much to take  · how to take this  · when to take this  · additional instructions   0-9 Units, Subcutaneous, 3 Times Daily Before Meals      warfarin 5 MG tablet  Commonly known as: COUMADIN  What changed: Another medication with the same name was added. Make sure you understand how and when to take each.   7.5 mg, Oral, Nightly      PHARMACY TO DOSE WARFARIN  What changed: You were already taking a medication with the same name, and this  prescription was added. Make sure you understand how and when to take each.   Does not apply, Continuous PRN         Continue These Medications      Instructions Start Date   atorvastatin 40 MG tablet  Commonly known as: LIPITOR   40 mg, Oral, Daily      diphenhydrAMINE 25 mg capsule  Commonly known as: BENADRYL   25 mg, Oral, 2 Times Daily PRN      famotidine 20 MG tablet  Commonly known as: PEPCID   20 mg, Oral, 2 Times Daily Before Meals      ferrous gluconate 324 MG tablet  Commonly known as: FERGON   324 mg, Oral, Every Other Day      fish oil 1000 MG capsule capsule   4,000 mg, Oral, Daily With Breakfast      lactobacillus acidophilus capsule capsule   1 capsule, Oral, Daily      magnesium oxide 400 MG tablet  Commonly known as: MAG-OX   400 mg, Oral, 2 Times Daily      metoprolol succinate XL 25 MG 24 hr tablet  Commonly known as: TOPROL-XL   12.5 mg, Oral, Daily      saccharomyces boulardii 250 MG capsule  Commonly known as: FLORASTOR   250 mg, Oral, 2 Times Daily      V-Go 40 kit   USE AS DIRECTED THREE TIMES A DAY         Stop These Medications    aspirin 81 MG EC tablet     furosemide 40 MG tablet  Commonly known as: Lasix     TRESIBA FLEXTOUCH SC     vancomycin 125 MG capsule  Commonly known as: VANCOCIN          Future Appointments   Date Time Provider Department Center   8/3/2022  8:00 AM Griffin Fried MD [facility]   9/22/2022  8:00 AM Rubin Hinkle APRN [facility]  [facility]      Follow-up Information     Rubin Hinkle APRN Follow up.    Specialties: Family Medicine, Urgent Care, Emergency Medicine  Why: After released from rehab  Contact information:  [address] [facility] [address] [phone]                       Discharge Order (From admission, onward)    None        Discharge Order (From admission, onward)     Start     Ordered    04/20/22 1317  Discharge readmit patient  Once        Expected Discharge Date: 04/20/22    Discharge Disposition: Rehab Facility or Unit ([facility]  [facility])    Physician of Record for Attribution - Please select from Treatment Team: LEOBARDO JUAREZ [[id number]]    Review needed by CMO to determine Physician of Record: No       Question Answer Comment   Physician of Record for Attribution - Please select from Treatment Team LEOBARDO JUAREZ    Review needed by CMO to determine Physician of Record No        04/20/22 1317    04/20/22 1304  Discharge patient  Once        Expected Discharge Date: 04/20/22    Discharge Disposition: Rehab Facility or Unit ([facility])    Physician of Record for Attribution - Please select from Treatment Team: LEOBARDO JUAREZ [[id number]]    Review needed by CMO to determine Physician of Record: No       Question Answer Comment   Physician of Record for Attribution - Please select from Treatment Team LEOBARDO JUAREZ    Review needed by CMO to determine Physician of Record No        04/20/22 1316                  Total time spent discharging patient including evaluation,post hospitalization follow up,  medication and post hospitalization instructions and education total time exceeds 30 minutes.    Signed:  Leobardo Juarez MD  4/20/2022  13:18 EDT

## 2022-04-20 NOTE — PROGRESS NOTES
[facility] Clinical Pharmacy Services: Warfarin Dosing/Monitoring Consult    Francisco Sequeira is a 86 y.o. male, estimated creatinine clearance is 47.2 mL/min (by C-G formula based on SCr of 1.12 mg/dL). weighing  .    Results from last 7 days   Lab Units 04/20/22  0710 04/19/22  0723 04/18/22  0721 04/17/22  0838 04/16/22  1014   INR  2.07* 1.82* 1.57* 1.44* 1.20*   HEMOGLOBIN g/dL 9.7* 9.3* 9.2* 9.0* 10.0*   HEMATOCRIT % 30.5* 29.2* 29.4* 29.4* 32.4*   PLATELETS 10*3/mm3 219 217 214 214 259     Prior to admission anticoagulation: warfarin 7.5 mg daily    Hospital Anticoagulation:  Consulting provider: Dr. Juarez  Start date: 4/20/22  Indication: Mechanical valve  Target INR:  3-3.5  Expected duration: tbd   Bridge Therapy: Yes  with enoxaparin    Potential food or drug interactions: none currently    Education complete?/Date: No; plan for follow up TBD    Assessment/Plan:  Dose: 10 mg x 1 dose, then resume 7.5 mg starting tomorrow  Monitor for any signs or symptoms of bleeding  Follow up daily INRs and dose adjustments    Pharmacy will continue to follow until discharge or discontinuation of warfarin.     GRECIA HALL [facility]  Clinical Pharmacist

## 2022-04-20 NOTE — CASE MANAGEMENT/SOCIAL WORK
Continued Stay Note  [facility]     Patient Name: Francisco Sequeira  MRN: [id number]  Today's Date: 4/20/2022    Admit Date: 4/5/2022     Discharge Plan     Row Name 04/20/22 1151       Plan    Plan BAR today pending new covid swab    Plan Comments Incoming call from Елена/UMESH who stated the pre-cert has been approved and they have a bed for patient today. BAR is requested a new updated covid test (pending). Awaiting results on covid test then patient will be ready to d/c if MD deems ready. JASON KAT               Discharge Codes    No documentation.               Expected Discharge Date and Time     Expected Discharge Date Expected Discharge Time    Apr 21, 2022             JASON RASHID

## 2022-04-20 NOTE — CASE MANAGEMENT/SOCIAL WORK
Case Management Discharge Note      Final Note: BAR    Provided Post Acute Provider List?: Yes  Post Acute Provider List: Inpatient Rehab  Provided Post Acute Provider Quality & Resource List?: Yes  Delivered To: Support Person  Support Person: Gladys Sequeira wife [phone]  Method of Delivery: In person    Selected Continued Care - Discharged on 4/20/2022 Admission date: 4/5/2022 - Discharge disposition: Rehab Facility or Unit ([facility])    Destination    No services have been selected for the patient.              Durable Medical Equipment    No services have been selected for the patient.              Dialysis/Infusion    No services have been selected for the patient.              Home Medical Care Coordination complete.    Service Provider Selected Services Address Phone Fax Patient Preferred     [facility]  [facility] [address] [id number] [phone] --          Therapy    No services have been selected for the patient.              Community Resources    No services have been selected for the patient.              Community & DME    No services have been selected for the patient.                Selected Continued Care - Prior Encounters Includes selections from prior encounters from 1/5/2022 to 4/20/2022    Discharged on 3/2/2022 Admission date: 2/14/2022 - Discharge disposition: Home-Health Care [facility]    Home Medical Care     Service Provider Selected Services Address Phone Fax Patient Preferred     [facility]  Home Health Services [address] [id number] [phone] --                         Final Discharge Disposition Code: 62 - inpatient rehab facility

## 2022-04-20 NOTE — PLAN OF CARE
Problem: Fall Injury Risk  Goal: Absence of Fall and Fall-Related Injury  Outcome: Ongoing, Progressing  Intervention: Identify and Manage Contributors  Recent Flowsheet Documentation  Taken 4/20/2022 1021 by Zaire Lima RN  Medication Review/Management: medications reviewed  Taken 4/20/2022 0822 by Zaire Lima RN  Medication Review/Management: medications reviewed  Intervention: Promote Injury-Free Environment  Recent Flowsheet Documentation  Taken 4/20/2022 1021 by Zaire Lima RN  Safety Promotion/Fall Prevention:   assistive device/personal items within reach   activity supervised   clutter free environment maintained   fall prevention program maintained   nonskid shoes/slippers when out of bed   safety round/check completed   room organization consistent  Taken 4/20/2022 0822 by Zaire Lima RN  Safety Promotion/Fall Prevention:   activity supervised   assistive device/personal items within reach   clutter free environment maintained   fall prevention program maintained   lighting adjusted   muscle strengthening facilitated   nonskid shoes/slippers when out of bed   room organization consistent   safety round/check completed     Problem: Pain Acute  Goal: Acceptable Pain Control and Functional Ability  Outcome: Ongoing, Progressing  Intervention: Prevent or Manage Pain  Recent Flowsheet Documentation  Taken 4/20/2022 1021 by Zaire Lima RN  Medication Review/Management: medications reviewed  Taken 4/20/2022 0822 by Zaire Lima RN  Medication Review/Management: medications reviewed     Problem: Skin Injury Risk Increased  Goal: Skin Health and Integrity  Outcome: Ongoing, Progressing  Intervention: Optimize Skin Protection  Recent Flowsheet Documentation  Taken 4/20/2022 1021 by Zaire Lima RN  Head of Bed (HOB) Positioning: HOB elevated  Taken 4/20/2022 0822 by Zaire Lima RN  Pressure Reduction Techniques:   sit time  limited to 2 hours   heels elevated off bed   pressure points protected  Head of Bed (HOB) Positioning: HOB elevated  Pressure Reduction Devices:   positioning supports utilized   alternating pressure pump (ADD)  Skin Protection:   adhesive use limited   tubing/devices free from skin contact   transparent dressing maintained     Problem: Adult Inpatient Plan of Care  Goal: Plan of Care Review  Outcome: Ongoing, Progressing  Flowsheets (Taken 4/20/2022 1418)  Progress: improving  Plan of Care Reviewed With:   patient   spouse  Outcome Evaluation: VSS, remains RA. No c/o pain, no signs of distress. A&Ox4. Afib rate controlled. Awaiting COVID test results.  Goal: Patient-Specific Goal (Individualized)  Outcome: Ongoing, Progressing  Goal: Absence of Hospital-Acquired Illness or Injury  Outcome: Ongoing, Progressing  Intervention: Identify and Manage Fall Risk  Recent Flowsheet Documentation  Taken 4/20/2022 1021 by Zaire Lima RN  Safety Promotion/Fall Prevention:   assistive device/personal items within reach   activity supervised   clutter free environment maintained   fall prevention program maintained   nonskid shoes/slippers when out of bed   safety round/check completed   room organization consistent  Taken 4/20/2022 0822 by Zaire Lima RN  Safety Promotion/Fall Prevention:   activity supervised   assistive device/personal items within reach   clutter free environment maintained   fall prevention program maintained   lighting adjusted   muscle strengthening facilitated   nonskid shoes/slippers when out of bed   room organization consistent   safety round/check completed  Intervention: Prevent Skin Injury  Recent Flowsheet Documentation  Taken 4/20/2022 1021 by Zaire Lima RN  Body Position:   tilted   left  Taken 4/20/2022 0822 by Zaire Lima RN  Body Position: supine, legs elevated  Skin Protection:   adhesive use limited   tubing/devices free from skin contact    transparent dressing maintained  Intervention: Prevent and Manage VTE (Venous Thromboembolism) Risk  Recent Flowsheet Documentation  Taken 4/20/2022 1021 by Zaire Lima, RN  Activity Management:   activity adjusted per tolerance   activity encouraged  Taken 4/20/2022 0822 by Zaire Lima, RN  Activity Management:   activity encouraged   activity adjusted per tolerance  VTE Prevention/Management:   bilateral   dorsiflexion/plantar flexion performed  Range of Motion:   active ROM (range of motion) encouraged   ROM (range of motion) performed  Goal: Optimal Comfort and Wellbeing  Outcome: Ongoing, Progressing  Intervention: Provide Person-Centered Care  Recent Flowsheet Documentation  Taken 4/20/2022 0822 by Zaire Lima, RN  Trust Relationship/Rapport: care explained  Goal: Readiness for Transition of Care  Outcome: Ongoing, Progressing   Goal Outcome Evaluation:  Plan of Care Reviewed With: patient, spouse        Progress: improving  Outcome Evaluation: VSS, remains RA. No c/o pain, no signs of distress. A&Ox4. Afib rate controlled. Awaiting COVID test results.

## 2022-04-20 NOTE — THERAPY TREATMENT NOTE
Patient Name: Francisco Sequeira  : 1935    MRN: [id number]                              Today's Date: 2022       Admit Date: 2022    Visit Dx:     ICD-10-CM ICD-9-CM   1. Hematoma of iliopsoas muscle, left, initial encounter  S70.12XA 924.00   2. Chronic anticoagulation  Z79.01 V58.61   3. H/O heart valve replacement with mechanical valve  Z95.2 V43.3   4. Immobility syndrome  M62.3 728.3   5. Anemia, unspecified type  D64.9 285.9   6. 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)   • Iron deficiency anemia   • Chronic anticoagulation   • Hemiparesis of left nondominant side as late effect of cerebral infarction (HCC)   • Rectus sheath hematoma   • Sepsis (HCC)   • Calculus of gallbladder with acute cholecystitis without obstruction   • History of Clostridium difficile infection   • Aspiration pneumonitis (HCC)   • Hematoma of iliopsoas muscle, left, initial encounter   • History of CVA (cerebrovascular accident)   • S/P laparoscopic cholecystectomy   • Anemia     Past Medical History:   Diagnosis Date   • Allergic rhinitis    • Aortic valve insufficiency    • Ascending aortic aneurysm (HCC)    • Atrial fibrillation (HCC)    • Bacteremia    • Calcific aortic stenosis of bicuspid valve    • Cardiac arrest (HCC)    • Cardiomyopathy (HCC)    • CKD (chronic kidney disease) stage 3, GFR 30-59 ml/min (HCC)    • Contact dermatitis due to poison ivy    • Elbow fracture    • Esophageal reflux    • GERD (gastroesophageal reflux disease)    • Head  injury    • History of transfusion    • Hyperglycemia    • Hyperlipidemia    • Hypertension    • Kidney carcinoma (HCC)    • Nonischemic cardiomyopathy (HCC)    • Renal oncocytoma    • Seasonal allergic reaction    • Sinusitis    • Syncope    • Type 2 diabetes mellitus (HCC)     uncontrolled   • Visual field defect      Past Surgical History:   Procedure Laterality Date   • AORTIC VALVE REPAIR/REPLACEMENT     • ASCENDING AORTIC ANEURYSM REPAIR W/ MECHANICAL AORTIC VALVE REPLACEMENT     • CHOLECYSTECTOMY WITH INTRAOPERATIVE CHOLANGIOGRAM N/A 3/29/2022    Procedure: Laparoscopic cholecystectomy with cholangiogram, possible open;  Surgeon: Lisa Guidry MD;  Location: [facility] MAIN OR;  Service: General;  Laterality: N/A;   • COLONOSCOPY N/A 10/28/2021    Procedure: COLONOSCOPY to cecum:  cold snare polyps,;  Surgeon: Dinesh Meza MD;  Location: [facility] ENDOSCOPY;  Service: Gastroenterology;  Laterality: N/A;  pre:  Iron deficiency anemia  post:  polyps, diverticulosis,    • COLONOSCOPY N/A 11/9/2021    Procedure: COLONOSCOPY to cecum with APC cautery of AVM and clip placement x1;  Surgeon: Pavan Rodriguez MD;  Location: [facility] ENDOSCOPY;  Service: Gastroenterology;  Laterality: N/A;  PRE - gi bleed, anemia  POST - diverticulosis, poor prep, AVM right colon   • ENDOSCOPY N/A 10/28/2021    Procedure: ESOPHAGOGASTRODUODENOSCOPY with biopsies;  Surgeon: Dinesh Meza MD;  Location: [facility] ENDOSCOPY;  Service: Gastroenterology;  Laterality: N/A;  pre:  Iron deficiency anemia  post:  duodenitis and gastritis   • EYE SURGERY  12/09/2020    cataract surgery    • NEPHRECTOMY     • OTHER SURGICAL HISTORY      elbow surgery   • OTHER SURGICAL HISTORY      right arm surgery   • PROSTATE SURGERY     • THORACENTESIS Left     diagnostic      General Information     Row Name 04/20/22 1240          OT Time and Intention    Document Type therapy note (daily note)  -MW     Mode of Treatment  co-treatment;occupational therapy  -     Row Name 04/20/22 1240          General Information    Patient Profile Reviewed yes  -MW     Existing Precautions/Restrictions fall  -MW     Row Name 04/20/22 1240          Cognition    Orientation Status (Cognition) oriented x 3  -MW     Row Name 04/20/22 1240          Safety Issues, Functional Mobility    Impairments Affecting Function (Mobility) balance;endurance/activity tolerance;motor control;postural/trunk control;strength  -MW           User Key  (r) = Recorded By, (t) = Taken By, (c) = Cosigned By    Initials Name Provider Type    Raquel Kebede OT Occupational Therapist                 Mobility/ADL's     Row Name 04/20/22 1240          Bed Mobility    Bed Mobility supine-sit;scooting/bridging  -MW     Scooting/Bridging Nye (Bed Mobility) standby assist  -MW     Supine-Sit Nye (Bed Mobility) standby assist  -MW     Sit-Supine Nye (Bed Mobility) not tested  -MW     Assistive Device (Bed Mobility) bed rails  -     Comment, (Bed Mobility) improved bed mob, SBA-SPV EOB sitting balance  -     Row Name 04/20/22 1240          Transfers    Transfers sit-stand transfer  -     Comment, (Transfers) x1 STS from EOB with tactile cue at L knee to prevent buckling, CGA for STS activity at gait belt  -     Sit-Stand Nye (Transfers) minimum assist (75% patient effort);other (see comments)  min A for blocking knee by PT, CGA at gait belt with max cues/assist for Rwx progression and safe distance  -     Row Name 04/20/22 1240          Functional Mobility    Functional Mobility- Comment pt demo improved functional ambulation this date into hallway with Rwx, min A at L knee and CGA mostly for balance  -MW           User Key  (r) = Recorded By, (t) = Taken By, (c) = Cosigned By    Initials Name Provider Type    Raquel Kebede OT Occupational Therapist               Obj/Interventions     Row Name 04/20/22 1244          Shoulder  (Therapeutic Exercise)    Shoulder (Therapeutic Exercise) AROM (active range of motion)  -     Shoulder AROM (Therapeutic Exercise) bilateral;flexion;extension;horizontal aBduction/aDduction;2 sets;20 repititions;sitting  -     Row Name 04/20/22 1244          Elbow/Forearm (Therapeutic Exercise)    Elbow/Forearm (Therapeutic Exercise) AROM (active range of motion)  -     Elbow/Forearm AROM (Therapeutic Exercise) flexion;extension;bilateral;2 sets;20 repititions  -     Row Name 04/20/22 1244          Motor Skills    Motor Skills motor control/coordination interventions  -     Motor Control/Coordination Interventions neuro-muscular re-education  -     Row Name 04/20/22 1244          Balance    Balance Assessment sitting static balance;sitting dynamic balance;sit to stand dynamic balance;standing dynamic balance;standing static balance  -     Static Sitting Balance supervision  -     Dynamic Sitting Balance standby assist  -     Position, Sitting Balance unsupported;sitting edge of bed  -     Sit to Stand Dynamic Balance contact guard  -     Static Standing Balance contact guard;minimal assist  -     Dynamic Standing Balance minimal assist;2-person assist  -     Position/Device Used, Standing Balance supported;walker, front-wheeled  -     Comment, Balance no overt LOBs with L knee tactile cues with each advancement  -[facility] Name 04/20/22 1244          Neuromuscular Re-education    Comment (Neuromuscular Re-education) pt completed functional reaching task UIC, demo x20 reps reaching outside base of support and across midline to challenge dynamic sitting for improved upper trunk control  -           User Key  (r) = Recorded By, (t) = Taken By, (c) = Cosigned By    Initials Name Provider Type    Raquel Kebede OT Occupational Therapist               Goals/Plan    No documentation.                Clinical Impression     Row Name 04/20/22 1245          Pain Assessment    Pretreatment  Pain Rating 0/10 - no pain  -MW     Posttreatment Pain Rating 0/10 - no pain  -MW     Row Name 04/20/22 1249          Plan of Care Review    Plan of Care Reviewed With patient;spouse  -MW     Progress improving  -MW     Outcome Evaluation Pt seen for OT tx session this AM, contiues to stay motivated and participate in all sessions. Pt progressing with functional ambulation this date, STS with CGA and able to increase distance into hallway, simluating proper household distances, CGA at CGA belt while PT blocked L knee with min A. Pt required multiple cues for proper carryover with advancement of Rwx throughout mobility task. Pt engaged in functional reaching task UIC to improve upper trunk control required to support self optimally throughout ADLs and completed BUE ther ex 2x20 in functional planes with good end range. Pt will continue to benefit from skilled OT.  -     Row Name 04/20/22 1247          Therapy Plan Review/Discharge Plan (OT)    Anticipated Discharge Disposition (OT) inpatient rehabilitation facility  -     Row Name 04/20/22 1242          Vital Signs    O2 Delivery Pre Treatment room air  -MW     O2 Delivery Intra Treatment room air  -MW     O2 Delivery Post Treatment room air  -MW     Pre Patient Position Supine  -MW     Intra Patient Position Standing  -MW     Post Patient Position Sitting  -MW     Row Name 04/20/22 1249          Positioning and Restraints    Pre-Treatment Position in bed  -MW     Post Treatment Position chair  -MW     In Chair notified nsg;reclined;call light within reach;encouraged to call for assist;with family/caregiver  -           User Key  (r) = Recorded By, (t) = Taken By, (c) = Cosigned By    Initials Name Provider Type    Raquel Kebede, OT Occupational Therapist               Outcome Measures     Row Name 04/20/22 5254          How much help from another is currently needed...    Putting on and taking off regular lower body clothing? 3  -MW     Bathing  (including washing, rinsing, and drying) 3  -MW     Toileting (which includes using toilet bed pan or urinal) 2  -MW     Putting on and taking off regular upper body clothing 3  -MW     Taking care of personal grooming (such as brushing teeth) 3  -MW     Eating meals 3  -MW     AM-PAC 6 Clicks Score (OT) 17  -MW     Row Name 04/20/22 1248          Functional Assessment    Outcome Measure Options AM-PAC 6 Clicks Daily Activity (OT)  -MW           User Key  (r) = Recorded By, (t) = Taken By, (c) = Cosigned By    Initials Name Provider Type    Raquel Kebede OT Occupational Therapist                Occupational Therapy Education                 Title: PT OT SLP Therapies (In Progress)     Topic: Occupational Therapy (In Progress)     Point: ADL training (Done)     Description:   Instruct learner(s) on proper safety adaptation and remediation techniques during self care or transfers.   Instruct in proper use of assistive devices.              Learning Progress Summary           Patient Acceptance, E, VU by MILLIE at 4/12/2022 1150    Comment: role of OT, plan of care, safety                   Point: Home exercise program (Not Started)     Description:   Instruct learner(s) on appropriate technique for monitoring, assisting and/or progressing therapeutic exercises/activities.              Learner Progress:  Not documented in this visit.          Point: Precautions (Done)     Description:   Instruct learner(s) on prescribed precautions during self-care and functional transfers.              Learning Progress Summary           Patient Acceptance, E, VU by MILLIE at 4/12/2022 1150    Comment: role of OT, plan of care, safety                   Point: Body mechanics (Done)     Description:   Instruct learner(s) on proper positioning and spine alignment during self-care, functional mobility activities and/or exercises.              Learning Progress Summary           Patient Acceptance, E, VU by MILLIE at 4/12/2022 1150    Comment:  role of OT, plan of care, safety                               User Key     Initials Effective Dates Name Provider Type Discipline     06/10/21 -  Sujey Herron OT Occupational Therapist OT              OT Recommendation and Plan     Plan of Care Review  Plan of Care Reviewed With: patient, spouse  Progress: improving  Outcome Evaluation: Pt seen for OT tx session this AM, contiues to stay motivated and participate in all sessions. Pt progressing with functional ambulation this date, STS with CGA and able to increase distance into hallway, simluating proper household distances, CGA at CGA belt while PT blocked L knee with min A. Pt required multiple cues for proper carryover with advancement of Rwx throughout mobility task. Pt engaged in functional reaching task UIC to improve upper trunk control required to support self optimally throughout ADLs and completed BUE ther ex 2x20 in functional planes with good end range. Pt will continue to benefit from skilled OT.     Time Calculation:    Time Calculation- OT     Row Name 04/20/22 1248             Time Calculation- OT    OT Start Time 1048  -MW      OT Stop Time 1111  -MW      OT Time Calculation (min) 23 min  -MW      Total Timed Code Minutes- OT 23 minute(s)  -MW      OT Received On 04/20/22  -MW      OT - Next Appointment 04/21/22  -MW              Timed Charges    22630 - OT Therapeutic Exercise Minutes 10  -MW      27396 - OT Therapeutic Activity Minutes 13  -MW              Total Minutes    Timed Charges Total Minutes 23  -MW       Total Minutes 23  -MW            User Key  (r) = Recorded By, (t) = Taken By, (c) = Cosigned By    Initials Name Provider Type     Raquel Singletary OT Occupational Therapist              Therapy Charges for Today     Code Description Service Date Service Provider Modifiers Qty    09014749981 HC OT THER PROC EA 15 MIN 4/19/2022 Raquel Singletary OT GO 1    58213661670 HC OT THERAPEUTIC ACT EA 15 MIN 4/19/2022 Raquel Singletary  OT GO 1    33465493428 HC OT SELF CARE/MGMT/TRAIN EA 15 MIN 4/19/2022 Raquel Singletary OT GO 1    28341570289 HC OT THERAPEUTIC ACT EA 15 MIN 4/20/2022 Raquel Singletary OT GO 1    56615811886 HC OT THER PROC EA 15 MIN 4/20/2022 Raquel Singletary OT GO 1               Raquel Singletary OT  4/20/2022

## 2022-04-20 NOTE — PROGRESS NOTES
Insurance precertification obtained. Bed available today pending medical stability and negative covid test. Please complete discharge-readmit in Epic when discharging patient to rehab.    Елена Lee RN  Rehab Admissions Nurse  [phone]

## 2022-04-20 NOTE — THERAPY TREATMENT NOTE
Patient Name: Francisco Sequeira  : 1935    MRN: [id number]                              Today's Date: 2022       Admit Date: 2022    Visit Dx:     ICD-10-CM ICD-9-CM   1. Hematoma of iliopsoas muscle, left, initial encounter  S70.12XA 924.00   2. Chronic anticoagulation  Z79.01 V58.61   3. H/O heart valve replacement with mechanical valve  Z95.2 V43.3   4. Immobility syndrome  M62.3 728.3   5. Anemia, unspecified type  D64.9 285.9   6. 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)   • Iron deficiency anemia   • Chronic anticoagulation   • Hemiparesis of left nondominant side as late effect of cerebral infarction (HCC)   • Rectus sheath hematoma   • Sepsis (HCC)   • Calculus of gallbladder with acute cholecystitis without obstruction   • History of Clostridium difficile infection   • Aspiration pneumonitis (HCC)   • Hematoma of iliopsoas muscle, left, initial encounter   • History of CVA (cerebrovascular accident)   • S/P laparoscopic cholecystectomy   • Anemia     Past Medical History:   Diagnosis Date   • Allergic rhinitis    • Aortic valve insufficiency    • Ascending aortic aneurysm (HCC)    • Atrial fibrillation (HCC)    • Bacteremia    • Calcific aortic stenosis of bicuspid valve    • Cardiac arrest (HCC)    • Cardiomyopathy (HCC)    • CKD (chronic kidney disease) stage 3, GFR 30-59 ml/min (HCC)    • Contact dermatitis due to poison ivy    • Elbow fracture    • Esophageal reflux    • GERD (gastroesophageal reflux disease)    • Head  injury    • History of transfusion    • Hyperglycemia    • Hyperlipidemia    • Hypertension    • Kidney carcinoma (HCC)    • Nonischemic cardiomyopathy (HCC)    • Renal oncocytoma    • Seasonal allergic reaction    • Sinusitis    • Syncope    • Type 2 diabetes mellitus (HCC)     uncontrolled   • Visual field defect      Past Surgical History:   Procedure Laterality Date   • AORTIC VALVE REPAIR/REPLACEMENT     • ASCENDING AORTIC ANEURYSM REPAIR W/ MECHANICAL AORTIC VALVE REPLACEMENT     • CHOLECYSTECTOMY WITH INTRAOPERATIVE CHOLANGIOGRAM N/A 3/29/2022    Procedure: Laparoscopic cholecystectomy with cholangiogram, possible open;  Surgeon: Lisa Guidry MD;  Location: [facility] MAIN OR;  Service: General;  Laterality: N/A;   • COLONOSCOPY N/A 10/28/2021    Procedure: COLONOSCOPY to cecum:  cold snare polyps,;  Surgeon: Dinesh Meza MD;  Location: [facility] ENDOSCOPY;  Service: Gastroenterology;  Laterality: N/A;  pre:  Iron deficiency anemia  post:  polyps, diverticulosis,    • COLONOSCOPY N/A 11/9/2021    Procedure: COLONOSCOPY to cecum with APC cautery of AVM and clip placement x1;  Surgeon: Pavan Rodriguez MD;  Location: [facility] ENDOSCOPY;  Service: Gastroenterology;  Laterality: N/A;  PRE - gi bleed, anemia  POST - diverticulosis, poor prep, AVM right colon   • ENDOSCOPY N/A 10/28/2021    Procedure: ESOPHAGOGASTRODUODENOSCOPY with biopsies;  Surgeon: Dinesh Meza MD;  Location: [facility] ENDOSCOPY;  Service: Gastroenterology;  Laterality: N/A;  pre:  Iron deficiency anemia  post:  duodenitis and gastritis   • EYE SURGERY  12/09/2020    cataract surgery    • NEPHRECTOMY     • OTHER SURGICAL HISTORY      elbow surgery   • OTHER SURGICAL HISTORY      right arm surgery   • PROSTATE SURGERY     • THORACENTESIS Left     diagnostic      General Information     Row Name 04/20/22 1500          Physical Therapy Time and Intention    Document Type therapy note (daily note)  -EE     Mode of Treatment  co-treatment;occupational therapy  -EE     Row Name 04/20/22 1500          General Information    Existing Precautions/Restrictions fall  -EE     Row Name 04/20/22 1500          Cognition    Orientation Status (Cognition) oriented x 3  -EE     Row Name 04/20/22 1500          Safety Issues, Functional Mobility    Impairments Affecting Function (Mobility) balance;endurance/activity tolerance;motor control;postural/trunk control;strength  -EE           User Key  (r) = Recorded By, (t) = Taken By, (c) = Cosigned By    Initials Name Provider Type    EE Sanjuanita Soto, CICI Physical Therapist               Mobility     Row Name 04/20/22 1500          Bed Mobility    Supine-Sit [place] (Bed Mobility) standby assist  -EE     Assistive Device (Bed Mobility) bed rails;head of bed elevated  -EE     Row Name 04/20/22 1500          Sit-Stand Transfer    Sit-Stand [place] (Transfers) minimum assist (75% patient effort);other (see comments);contact guard;2 person assist  Min A blocking L knee; 2nd person providing CGA at gait belt  -EE     Row Name 04/20/22 1500          Gait/Stairs (Locomotion)    [place] Level (Gait) minimum assist (75% patient effort);2 person assist;verbal cues;nonverbal cues (demo/gesture)  + 3rd person following with chair for safety  -EE     Assistive Device (Gait) walker, front-wheeled  -EE     Distance in Feet (Gait) 20'  -EE     Deviations/Abnormal Patterns (Gait) raul decreased;left sided deviations;stride length decreased  -EE     Bilateral Gait Deviations forward flexed posture  -EE     Left Sided Gait Deviations heel strike decreased;weight shift ability decreased;knee buckling, left side  -EE     Comment, (Gait/Stairs) Therapist providing max A to block L knee into extension during stance, tc's at L hip for extension. Vc's for upright posture and gait sequencing with walker. Second person providing min A at gait belt for balance/safety and third person following closely with chair.   -EE           User Key  (r) = Recorded By, (t) = Taken By, (c) = Cosigned By    Initials Name Provider Type    EE Sanjuanita Soto, PT Physical Therapist               Obj/Interventions    No documentation.                Goals/Plan    No documentation.                Clinical Impression     Row Name 04/20/22 1502          Pain    Pretreatment Pain Rating 0/10 - no pain  -EE     Posttreatment Pain Rating 0/10 - no pain  -EE     Row Name 04/20/22 1502          Plan of Care Review    Plan of Care Reviewed With patient  -EE     Progress improving  -EE     Outcome Evaluation Pt continues to demonstrate good progress with mobility. Demonstrates improved endurance, as evidenced by tolerance of increased ambulation distance. Pt continues to demonstrate L quad and hip weakness and requires manual assistance to prevent L knee buckling during gait. Pt very motivated for therapy and remains an excellent rehab candidate. Pt will continue to benefit from PT for ongoing strengthening, balance training, and mobilization.  -EE     Row Name 04/20/22 1502          Positioning and Restraints    Pre-Treatment Position in bed  -EE     Post Treatment Position chair  -EE     In Chair sitting;call light within reach;encouraged to call for assist;exit alarm on;with family/caregiver;notified nsg  -EE           User Key  (r) = Recorded By, (t) = Taken By, (c) = Cosigned By    Initials Name Provider Type    EE Sanjuanita Soto, CICI Physical Therapist               Outcome Measures     Row Name 04/20/22 1503          How much help from another person do you currently need...    Turning from your back to your side while in flat bed without using bedrails? 3  -EE     Moving from lying on back to sitting on the side of a flat bed without bedrails? 3  -EE     Moving to and from a bed to a chair (including a wheelchair)? 2  -EE     Standing up from a chair using your arms (e.g., wheelchair, bedside chair)? 3  -EE     Climbing 3-5 steps with a railing? 1  -EE      To walk in hospital room? 2  -EE     AM-PAC 6 Clicks Score (PT) 14  -EE     Row Name 04/20/22 1248          Functional Assessment    Outcome Measure Options AM-PAC 6 Clicks Daily Activity (OT)  -MW           User Key  (r) = Recorded By, (t) = Taken By, (c) = Cosigned By    Initials Name Provider Type    EE Sanjuanita Soto, PT Physical Therapist    Raquel Kebede, OT Occupational Therapist                             Physical Therapy Education                 Title: PT OT SLP Therapies (In Progress)     Topic: Physical Therapy (In Progress)     Point: Mobility training (In Progress)     Learning Progress Summary           Patient Acceptance, E,TB, VU,NR by EE at 4/20/2022 1504    Acceptance, E, VU by AG at 4/20/2022 1417    Acceptance, E,TB, VU,NR by EE at 4/19/2022 1200    Acceptance, E,TB, VU,NR by EE at 4/18/2022 1552    Acceptance, E,TB,D, VU,NR by EJ at 4/17/2022 1621    Acceptance, E, NR by  at 4/14/2022 1025    Acceptance, E, NR by CF at 4/12/2022 1349    Acceptance, E, VU by DB at 4/10/2022 1614    Acceptance, E,TB, NR by MS at 4/7/2022 1535   Family Acceptance, E,TB,D, VU,NR by EJ at 4/17/2022 1621    Acceptance, E, VU by DB at 4/10/2022 1614   Significant Other Acceptance, E,TB, NR by MS at 4/7/2022 1535                   Point: Home exercise program (In Progress)     Learning Progress Summary           Patient Acceptance, E, VU by AG at 4/20/2022 1417    Acceptance, E,TB, VU,NR by EE at 4/19/2022 1200    Acceptance, E,TB, VU,NR by EE at 4/18/2022 1552    Acceptance, E,TB,D, VU,NR by EJ at 4/17/2022 1621    Acceptance, E, NR by  at 4/14/2022 1025    Acceptance, E, NR by CF at 4/12/2022 1349    Acceptance, E, VU by DB at 4/10/2022 1614    Acceptance, E,TB, NR by MS at 4/7/2022 1535   Family Acceptance, E,TB,D, VU,NR by EJ at 4/17/2022 1621    Acceptance, E, VU by DB at 4/10/2022 1614   Significant Other Acceptance, E,TB, NR by MS at 4/7/2022 1535                   Point: Body mechanics (In  Progress)     Learning Progress Summary           Patient Acceptance, E,TB, VU,NR by EE at 4/20/2022 1504    Acceptance, E, VU by AG at 4/20/2022 1417    Acceptance, E,TB, VU,NR by EE at 4/19/2022 1200    Acceptance, E,TB, VU,NR by EE at 4/18/2022 1552    Acceptance, E, NR by  at 4/14/2022 1025    Acceptance, E, NR by  at 4/12/2022 1349    Acceptance, E, VU by DB at 4/10/2022 1614    Acceptance, E,TB, NR by MS at 4/7/2022 1535   Family Acceptance, E, VU by DB at 4/10/2022 1614   Significant Other Acceptance, E,TB, NR by MS at 4/7/2022 1535                   Point: Precautions (In Progress)     Learning Progress Summary           Patient Acceptance, E,TB, VU,NR by EE at 4/20/2022 1504    Acceptance, E, VU by AG at 4/20/2022 1417    Acceptance, E,TB, VU,NR by EE at 4/19/2022 1200    Acceptance, E,TB, VU,NR by EE at 4/18/2022 1552    Acceptance, E, NR by  at 4/14/2022 1025    Acceptance, E, NR by CF at 4/12/2022 1349    Acceptance, E, VU by DB at 4/10/2022 1614    Acceptance, E,TB, NR by MS at 4/7/2022 1535   Family Acceptance, E, VU by DB at 4/10/2022 1614   Significant Other Acceptance, E,TB, NR by MS at 4/7/2022 1535                               User Key     Initials Effective Dates Name Provider Type Discipline     06/16/21 -  Sandi Shepard, PT Physical Therapist PT    EE 06/16/21 -  Sanjuanita Soto, PT Physical Therapist PT    EJ 06/16/21 -  Lorri Knox, PT Physical Therapist PT    MS 06/16/21 -  Goldie Abrams, PT Physical Therapist PT    DB 06/16/21 -  Angelica Pierson, PT Physical Therapist PT    AG 06/16/21 -  Zaire Lima, RN Registered Nurse Nurse    CF 06/16/21 -  Antunez, Rhoda, PT Physical Therapist PT              PT Recommendation and Plan     Plan of Care Reviewed With: patient  Progress: improving  Outcome Evaluation: Pt continues to demonstrate good progress with mobility. Demonstrates improved endurance, as evidenced by tolerance of increased ambulation distance. Pt  continues to demonstrate L quad and hip weakness and requires manual assistance to prevent L knee buckling during gait. Pt very motivated for therapy and remains an excellent rehab candidate. Pt will continue to benefit from PT for ongoing strengthening, balance training, and mobilization.     Time Calculation:    PT Charges     Row Name 04/20/22 1504             Time Calculation    Start Time 1049  -EE      Stop Time 1101  -EE      Time Calculation (min) 12 min  -EE      PT Received On 04/20/22  -EE      PT - Next Appointment 04/21/22  -EE              Time Calculation- PT    Total Timed Code Minutes- PT 12 minute(s)  -EE              Timed Charges    44686 - Gait Training Minutes  12  -EE              Total Minutes    Timed Charges Total Minutes 12  -EE       Total Minutes 12  -EE            User Key  (r) = Recorded By, (t) = Taken By, (c) = Cosigned By    Initials Name Provider Type    EE Sanjuanita Soto, CICI Physical Therapist              Therapy Charges for Today     Code Description Service Date Service Provider Modifiers Qty    96687617485 HC PT THERAPEUTIC ACT EA 15 MIN 4/19/2022 Sanjuanita Soto, PT GP 1    61949964315 HC GAIT TRAINING EA 15 MIN 4/20/2022 Sanjuanita Soto, PT GP 1    92683060634 HC PT THER SUPP EA 15 MIN 4/20/2022 Sanjuanita Soto, PT GP 1          PT G-Codes  Outcome Measure Options: AM-PAC 6 Clicks Daily Activity (OT)  AM-PAC 6 Clicks Score (PT): 14  AM-PAC 6 Clicks Score (OT): 17     Patient was intermittently wearing a face mask during this therapy encounter. Therapist used appropriate personal protective equipment including mask and gloves.  Mask used was standard procedure mask. Appropriate PPE was worn during the entire therapy session. Hand hygiene was completed before and after therapy session. Patient is not in enhanced droplet precautions.       Sanjuanita Soto PT  4/20/2022

## 2022-04-20 NOTE — PLAN OF CARE
Goal Outcome Evaluation:  Plan of Care Reviewed With: patient        Progress: improving  Outcome Evaluation: Pt continues to demonstrate good progress with mobility. Demonstrates improved endurance, as evidenced by tolerance of increased ambulation distance. Pt continues to demonstrate L quad and hip weakness and requires manual assistance to prevent L knee buckling during gait. Pt very motivated for therapy and remains an excellent rehab candidate. Pt will continue to benefit from PT for ongoing strengthening, balance training, and mobilization.

## 2022-04-21 LAB
DEPRECATED RDW RBC AUTO: 50.7 FL (ref 37–54)
ERYTHROCYTE [DISTWIDTH] IN BLOOD BY AUTOMATED COUNT: 16 % (ref 12.3–15.4)
GLUCOSE BLDC GLUCOMTR-MCNC: 118 MG/DL (ref 70–130)
GLUCOSE BLDC GLUCOMTR-MCNC: 124 MG/DL (ref 70–130)
GLUCOSE BLDC GLUCOMTR-MCNC: 159 MG/DL (ref 70–130)
GLUCOSE BLDC GLUCOMTR-MCNC: 68 MG/DL (ref 70–130)
HCT VFR BLD AUTO: 30.4 % (ref 37.5–51)
HGB BLD-MCNC: 9.2 G/DL (ref 13–17.7)
INR PPP: 2.92 (ref 0.9–1.1)
MCH RBC QN AUTO: 26 PG (ref 26.6–33)
MCHC RBC AUTO-ENTMCNC: 30.3 G/DL (ref 31.5–35.7)
MCV RBC AUTO: 85.9 FL (ref 79–97)
PLATELET # BLD AUTO: 213 10*3/MM3 (ref 140–450)
PMV BLD AUTO: 11.6 FL (ref 6–12)
PROTHROMBIN TIME: 30.6 SECONDS (ref 11.7–14.2)
RBC # BLD AUTO: 3.54 10*6/MM3 (ref 4.14–5.8)
WBC NRBC COR # BLD: 3.38 10*3/MM3 (ref 3.4–10.8)

## 2022-04-21 PROCEDURE — 85027 COMPLETE CBC AUTOMATED: CPT | Performed by: PHYSICAL MEDICINE & REHABILITATION

## 2022-04-21 PROCEDURE — 97166 OT EVAL MOD COMPLEX 45 MIN: CPT

## 2022-04-21 PROCEDURE — 97110 THERAPEUTIC EXERCISES: CPT

## 2022-04-21 PROCEDURE — 82962 GLUCOSE BLOOD TEST: CPT

## 2022-04-21 PROCEDURE — 97535 SELF CARE MNGMENT TRAINING: CPT

## 2022-04-21 PROCEDURE — 85610 PROTHROMBIN TIME: CPT | Performed by: PHYSICAL MEDICINE & REHABILITATION

## 2022-04-21 PROCEDURE — 25010000002 ENOXAPARIN PER 10 MG: Performed by: PHYSICAL MEDICINE & REHABILITATION

## 2022-04-21 PROCEDURE — 97530 THERAPEUTIC ACTIVITIES: CPT

## 2022-04-21 PROCEDURE — 97162 PT EVAL MOD COMPLEX 30 MIN: CPT

## 2022-04-21 RX ADMIN — DIGOXIN 62.5 MCG: 125 TABLET ORAL at 11:35

## 2022-04-21 RX ADMIN — INSULIN GLARGINE-YFGN 12 UNITS: 100 INJECTION, SOLUTION SUBCUTANEOUS at 22:51

## 2022-04-21 RX ADMIN — MAGNESIUM OXIDE 400 MG (241.3 MG MAGNESIUM) TABLET 400 MG: TABLET at 07:48

## 2022-04-21 RX ADMIN — FAMOTIDINE 20 MG: 20 TABLET ORAL at 16:40

## 2022-04-21 RX ADMIN — MAGNESIUM OXIDE 400 MG (241.3 MG MAGNESIUM) TABLET 400 MG: TABLET at 22:51

## 2022-04-21 RX ADMIN — NYSTATIN: 100000 POWDER TOPICAL at 22:53

## 2022-04-21 RX ADMIN — ATORVASTATIN CALCIUM 40 MG: 20 TABLET, FILM COATED ORAL at 07:48

## 2022-04-21 RX ADMIN — FAMOTIDINE 20 MG: 20 TABLET ORAL at 06:28

## 2022-04-21 RX ADMIN — NYSTATIN 1 APPLICATION: 100000 POWDER TOPICAL at 07:50

## 2022-04-21 RX ADMIN — METOPROLOL SUCCINATE 12.5 MG: 25 TABLET, EXTENDED RELEASE ORAL at 07:49

## 2022-04-21 RX ADMIN — ENOXAPARIN SODIUM 70 MG: 100 INJECTION SUBCUTANEOUS at 07:49

## 2022-04-21 RX ADMIN — Medication 1 CAPSULE: at 07:48

## 2022-04-21 NOTE — PROGRESS NOTES
Inpatient Rehabilitation Functional Measures Assessment and Plan of Care    Plan of Care  Updated Problems/Interventions  Mobility    [OT] Toilet Transfers(Active)  Current Status(04/21/2022): Mod squat pivot  Weekly Goal(04/28/2022): Min squat pivot  Discharge Goal: CGA        Self Care    [OT] Bathing(Active)  Current Status(04/21/2022): Mod with 2 for standing  Weekly Goal(04/28/2022): Mod  Discharge Goal: cga    [OT] Dressing (Lower)(Active)  Current Status(04/21/2022): dep x 2  Weekly Goal(04/28/2022): Max  Discharge Goal: Min    [OT] Dressing (Upper)(Active)  Current Status(04/21/2022): sba  Weekly Goal(04/28/2022): setup  Discharge Goal: setup    [OT] Grooming(Active)  Current Status(04/21/2022): sba seated  Weekly Goal(04/28/2022): setup  Discharge Goal: Mod Indep    [OT] Toileting(Active)  Current Status(04/21/2022): dep 2 persons  Weekly Goal(04/28/2022): Max x 1  Discharge Goal: CGA    Functional Measures  MEI Eating:  Branch  MEI Grooming: Branch  MEI Bathing:  Branch  MEI Upper Body Dressing:  Branch  MEI Lower Body Dressing:  Branch  MEI Toileting:  Branch    MEI Bladder Management  Level of Assistance:  Branch  Frequency/Number of Accidents this Shift:  Branch    MEI Bowel Management  Level of Assistance: Branch  Frequency/Number of Accidents this Shift: Branch    MEI Bed/Chair/Wheelchair Transfer:  Branch  MEI Toilet Transfer:  Branch  MEI Tub/Shower Transfer:  Branch    Previously Documented Mode of Locomotion at Discharge: Field  MEI Expected Mode of Locomotion at Discharge: Branch  MEI Walk/Wheelchair:  Branch  MEI Stairs:  Branch    MEI Comprehension:  Branch  MEI Expression:  Branch  MEI Social Interaction:  Branch  MEI Problem Solving:  Branch  MEI Memory:  Branch    Therapy Mode Minutes  Occupational Therapy: Branch  Physical Therapy: Branch  Speech Language Pathology:  Branch    Signed by: Francisco Jane OTR/L

## 2022-04-21 NOTE — PLAN OF CARE
Goal Outcome Evaluation:         Pt A/Ox4, not OOB overnight. Pt able to turn in bed independently. Waffle overlay ordered for mattress (pt preference) but unavailable, sacral mepilexes ordered . Meds taken whole in puree. No c/o pain overnight.

## 2022-04-21 NOTE — PROGRESS NOTES
Case Management  Inpatient Rehabilitation Plan of Care and Discharge Plan Note    Rehabilitation Diagnosis:  Debility  Date of Onset:  4/6/22    Medical Summary:  Patient is 86-year-old male who was previously dependent who  admitted to the [facility] rehabilitation unit with a history of  left iliopsoas hematoma with left lumbosacral plexopathy and left lower  extremity weakness.  He had undergone a laparoscopic cholecystectomy on March 29, 2022 for acute on  chronic cholecystitis.  He was on aspirin with Lovenox to Coumadin transition.  He has history of mechanical heart valve, previous stroke with INR goal 3.0-3.5,  and atrial fibrillation.  He was still on the transition from Lovenox to  Coumadin with INR of 1.6 when he presented to [facility] on April 6, 2022 with left lower extremity weakness.  CT scan showed left iliac us and  left iliopsoas hematoma.  He has weakness proximally greater than distally in  the left lower extremity consistent with a left lumbosacral plexopathy.  There  was attempt to aspirate the hematoma but was unable to obtain any material.  ?  Transferred to the rehabilitation unit for ongoing therapies.  On Lovenox to  Coumadin transition.  INR 2.07 on April 20 and INR 2.92 on April 21 and Lovenox has been discontinued  with an INR goal of 3.0-3.5.  He is to remain off of aspirin.  He denies any premorbid functional limitations.  Bed mobility contact-guard  assist-min assist of 2.  Minimal assist for blocking left knee.  Ambulated  minimum assist of 2 with third person following with a chair for safety rolling  walker 20 feet.  Therapist providing max assist to block left knee into  extension during stance.  Second person providing min assist to gait belt for  balance and safety in third person following closely with chair.  Functional mobility requires contact-guard min assist with blocking of the left  knee.  Occupational therapy working on upper trunk  control.  Past Medical History: Past Medical History:  DiagnosisDate  ?Allergic rhinitis?  ?Aortic valve insufficiency?  ?Ascending aortic aneurysm (HCC)?  ?Atrial fibrillation (HCC)?  ?Bacteremia?  ?Calcific aortic stenosis of bicuspid valve?  ?Cardiac arrest (HCC)?  ?Cardiomyopathy (HCC)?  ?CKD (chronic kidney disease) stage 3, GFR 30-59 ml/min (HCC)?  ?Contact dermatitis due to poison ivy?  ?Elbow fracture?  ?Esophageal reflux?  ?GERD (gastroesophageal reflux disease)?  ?Head injury?  ?History of transfusion?  ?Hyperglycemia?  ?Hyperlipidemia?  ?Hypertension?  ?Kidney carcinoma (HCC)?  ?Nonischemic cardiomyopathy (HCC)?  ?Renal oncocytoma?  ?Seasonal allergic reaction?  ?Sinusitis?  ?Syncope?  ?Type 2 diabetes mellitus (HCC)?  ?uncontrolled  ?Visual field defect?    ?  ?  Surgical HistoryExpand by Default  Past Surgical History:  ProcedureLateralityDate  ?AORTIC VALVE REPAIR/REPLACEMENT??  ?ASCENDING AORTIC ANEURYSM REPAIR W/ MECHANICAL AORTIC VALVE REPLACEMENT??  ?CHOLECYSTECTOMY WITH INTRAOPERATIVE CHOLANGIOGRAMN/A3/29/2022  ?Procedure: Laparoscopic cholecystectomy with cholangiogram, possible open;  Surgeon: Lisa Guidry MD;  Location: [facility] MAIN OR;  Service: General;  Laterality: N/A;  ?COLONOSCOPYN/A10/28/2021  ?Procedure: COLONOSCOPY to cecum:  cold snare polyps,;  Surgeon: Dinesh Meza MD;  Location: [facility] ENDOSCOPY;  Service: Gastroenterology;  Laterality: N/A;  pre:  Iron deficiency anemia  post:  polyps, diverticulosis,  ?COLONOSCOPYN/A11/9/2021  ?Procedure: COLONOSCOPY to cecum with APC cautery of AVM and clip placement x1;   Surgeon: Pavan Rodriguez MD;  Location: [facility] ENDOSCOPY;  Service:  Gastroenterology;  Laterality: N/A;  PRE - gi bleed, anemia  POST - diverticulosis, poor prep, AVM right colon  ?ENDOSCOPYN/A10/28/2021  ?Procedure: ESOPHAGOGASTRODUODENOSCOPY with biopsies;  Surgeon: Dinesh Meza MD;  Location: [facility] ENDOSCOPY;  Service: Gastroenterology;   Laterality:  N/A;  pre:  Iron deficiency anemia  post:  duodenitis and gastritis  ?EYE SURGERY?12/09/2020  ?cataract surgery  ?NEPHRECTOMY??  ?OTHER SURGICAL HISTORY??  ?elbow surgery  ?OTHER SURGICAL HISTORY??  ?right arm surgery  ?PROSTATE SURGERY??  ?THORACENTESISLeft?  ?diagnostic    ?    Plan of Care  Updated Problems/Interventions      Expected Intensity:  Average of 3 hours of therapy 5 days/week.  Interdisciplinary Team:  Interdisciplinary Team: Medical Supervision and 24 Hour Rehabilitation Nursing.,  Physical Therapy:, Occupational Therapy:, Social Work, Therapeutic Recreation.  Physical Therapy Intensity/Duration: 1.5 hours/day, 5 days/week for  approximately 10 days  Occupational Therapy Intensity/Duration: 1.5 hours/day, 5 days/week for  approximately 10 days  Estimated Length of Stay/Anticipated Discharge Date: ELOS: 2 weeks  Anticipated Discharge Destination:  Anticipated discharge destination from inpatient rehabilitation is community  discharge with assistance. Hoem with spouse      Based on the patient's medical and functional status, their prognosis and  expected level of functional improvement is:  Goals are to achieve a level of  contact-guard assist with  mobility and self-care and improved strength.  Rehabilitation prognosis indeterminate.  Medical prognosis indeterminate.    Signed by: Jayro Renee RN

## 2022-04-21 NOTE — PAYOR COMM NOTE
"Laura Xie (86 y.o. Male)     PLEASE SEE ATTACHED DC SUMMARY    REF#[id number]    THANK YOU    NAVDEEP LIEBERMAN LPN CCP            Date of Birth   1935    Social Security Number       Address   [address] [place] [address]    Home Phone   [phone]    MRN   [id number]       Scientologist   Buddhism    Marital Status                               Admission Date   4/5/22    Admission Type   Emergency    Admitting Provider   Bowen Coughlin MD    Attending Provider       Department, Room/Bed   [address], [address]/1       Discharge Date   4/20/2022    Discharge Disposition   Rehab Facility or Unit (Formerly [facility])    Discharge Destination                               Attending Provider: (none)   Allergies: Penicillins, Other, Percocet [Oxycodone-acetaminophen]    Isolation: None   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)   Code Status: CPR   Advance Care Planning Activity    Ht: 182.9 cm (72\")   Wt: 68 kg (150 lb)    Admission Cmt: None   Principal Problem: Hematoma of iliopsoas muscle, left, initial encounter [S70.12XA]                 Active Insurance as of 4/5/2022     Primary Coverage     Payor Plan Insurance Group Employer/Plan Group    ANTHEM BLUE CROSS ANTHEM BLUE CROSS BLUE SHIELD PPO [id number]     Payor Plan Address Payor Plan Phone Number Payor Plan Fax Number Effective Dates    [address] [phone]  1/1/2022 - None Entered    [facility] [id number]       Subscriber Name Subscriber Birth Date Member ID       PATRICIA XIE 7/11/1955 [id number]           Secondary Coverage     Payor Plan Insurance Group Employer/Plan Group    MEDICARE MEDICARE A & B      Payor Plan Address Payor Plan Phone Number Payor Plan Fax Number Effective Dates    [address] [phone]  11/1/2002 - None Entered    [facility] [id number]       Subscriber Name Subscriber Birth Date Member ID       LAURA XIE 1935 [id number]                 Emergency Contacts "      (Rel.) Home Phone Work Phone Mobile Phone    Gladys Sequeira (Spouse) [phone] -- [phone]    AlexsanderBruce felipe (Son) [phone] -- [phone]               Discharge Summary      Leobardo Juarez MD at 22 1312                                                                             PHYSICIAN DISCHARGE SUMMARY                                                                        [facility]    Patient Identification:  Name: Francisco Sequeira  Age: 86 y.o.  Sex: male  :  1935  MRN: [id number]  Primary Care Physician: Rubin Hinkle APRN    Admit date: 2022  Discharge date and time:2022  Discharged Condition: good    Discharge Diagnoses:  Active Hospital Problems    Diagnosis  POA   • **Hematoma of iliopsoas muscle, left, initial encounter [S70.12XA]  Yes   • History of CVA (cerebrovascular accident) [Z86.73]  Not Applicable   • S/P laparoscopic cholecystectomy [Z90.49]  Not Applicable   • Anemia [D64.9]  Yes   • Chronic anticoagulation [Z79.01]  Not Applicable   • Stage 3b chronic kidney disease (HCC) [N18.32]  Yes   • H/O heart valve replacement with mechanical valve [Z95.2]  Not Applicable   • Type 2 diabetes mellitus (HCC) [E11.9]  Yes   • Hypertension [I10]  Yes   • Atrial fibrillation (HCC) [I48.91]  Yes      Resolved Hospital Problems   No resolved problems to display.          PMHX:   Past Medical History:   Diagnosis Date   • Allergic rhinitis    • Aortic valve insufficiency    • Ascending aortic aneurysm (HCC)    • Atrial fibrillation (HCC)    • Bacteremia    • Calcific aortic stenosis of bicuspid valve    • Cardiac arrest (HCC)    • Cardiomyopathy (HCC)    • CKD (chronic kidney disease) stage 3, GFR 30-59 ml/min (HCC)    • Contact dermatitis due to poison ivy    • Elbow fracture    • Esophageal reflux    • GERD (gastroesophageal reflux disease)    • Head injury    • History of transfusion    • Hyperglycemia    • Hyperlipidemia    •  Hypertension    • Kidney carcinoma (HCC)    • Nonischemic cardiomyopathy (HCC)    • Renal oncocytoma    • Seasonal allergic reaction    • Sinusitis    • Syncope    • Type 2 diabetes mellitus (HCC)     uncontrolled   • Visual field defect      PSHX:   Past Surgical History:   Procedure Laterality Date   • AORTIC VALVE REPAIR/REPLACEMENT     • ASCENDING AORTIC ANEURYSM REPAIR W/ MECHANICAL AORTIC VALVE REPLACEMENT     • CHOLECYSTECTOMY WITH INTRAOPERATIVE CHOLANGIOGRAM N/A 3/29/2022    Procedure: Laparoscopic cholecystectomy with cholangiogram, possible open;  Surgeon: Lisa Guidry MD;  Location: [facility] MAIN OR;  Service: General;  Laterality: N/A;   • COLONOSCOPY N/A 10/28/2021    Procedure: COLONOSCOPY to cecum:  cold snare polyps,;  Surgeon: Dinesh Meza MD;  Location: [facility] ENDOSCOPY;  Service: Gastroenterology;  Laterality: N/A;  pre:  Iron deficiency anemia  post:  polyps, diverticulosis,    • COLONOSCOPY N/A 11/9/2021    Procedure: COLONOSCOPY to cecum with APC cautery of AVM and clip placement x1;  Surgeon: Pavan Rodriguez MD;  Location: [facility] ENDOSCOPY;  Service: Gastroenterology;  Laterality: N/A;  PRE - gi bleed, anemia  POST - diverticulosis, poor prep, AVM right colon   • ENDOSCOPY N/A 10/28/2021    Procedure: ESOPHAGOGASTRODUODENOSCOPY with biopsies;  Surgeon: Dinesh Meza MD;  Location: [facility] ENDOSCOPY;  Service: Gastroenterology;  Laterality: N/A;  pre:  Iron deficiency anemia  post:  duodenitis and gastritis   • EYE SURGERY  12/09/2020    cataract surgery    • NEPHRECTOMY     • OTHER SURGICAL HISTORY      elbow surgery   • OTHER SURGICAL HISTORY      right arm surgery   • PROSTATE SURGERY     • THORACENTESIS Left     diagnostic       Hospital Course: Francisco Sequeira  is a 86 y.o. male former smoker with a history of valvular heart disease, afib, chronic AC, CKD, DM, HTN, NICM, and multiple medical issues that presents to [facility] complaining of LLE  weakness, swelling. He was discharged from [facility] 4/4/22 following hospitalization for acute cholecystitis s/p lap cholecystectomy 3/29/22 and aspiration pneumonia. He was discharged on lovenox bridge and warfarin for subtherapeutic INR. He was doing well as far as walking around at discharge. Yesterday he began feeling weak in his LLE, having difficulty walking, and was unable to get up off of the toilet. Denies severe pain but has had lower leg edema that is uncomfortable. His wife reports his INR to be 1.3. Pt denies fever, chest pain, shortness of breath, n/v/d, dysuria, back pain, numbness/tingling.        The patient was admitted to hospital and seen by multiple consultants.  Patient had hematoma of the left iliopsoas muscle.  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  There was attempt to aspirate hematoma but were not able to obtain any material.  The patient was still very weak and after being in the hospital for couple weeks plan is to go to acute rehab for strengthening.  Follow-up with his primary care after released from rehab.  He is resumed Coumadin INR goal is still 3-3.5 since he has had previous stroke history with mechanical valve.    Consults:     Consults     Date and Time Order Name Status Description    4/15/2022 12:29 PM Inpatient Physical Medicine Rehab Consult      4/13/2022  2:27 AM Inpatient Cardiology Consult      4/8/2022  3:30 PM Inpatient Orthopedic Surgery Consult Completed     4/6/2022  3:27 AM Inpatient Orthopedic Surgery Consult Completed     4/6/2022  3:27 AM Inpatient Cardiology Consult Completed     4/6/2022  2:52 AM [facility] (on-call MD unless specified) Details      3/24/2022  9:21 AM Inpatient Infectious Diseases Consult Completed     3/24/2022  9:21 AM Inpatient Pulmonology Consult Completed     3/23/2022  1:38 PM Inpatient Cardiology Consult Completed     3/23/2022  1:32 PM Inpatient General Surgery Consult Completed     3/23/2022 10:39 AM Surgery  (on-call MD unless specified) Completed         Results from last 7 days   Lab Units 04/20/22  0710   WBC 10*3/mm3 3.28*   HEMOGLOBIN g/dL 9.7*   HEMATOCRIT % 30.5*   PLATELETS 10*3/mm3 219     Results from last 7 days   Lab Units 04/20/22  0710   SODIUM mmol/L 138   POTASSIUM mmol/L 4.4   CHLORIDE mmol/L 106   CO2 mmol/L 26.7   BUN mg/dL 16   CREATININE mg/dL 1.12   GLUCOSE mg/dL 70   CALCIUM mg/dL 8.4*     Significant Diagnostic Studies:   WBC   Date Value Ref Range Status   04/20/2022 3.28 (L) 3.40 - 10.80 10*3/mm3 Final     Hemoglobin   Date Value Ref Range Status   04/20/2022 9.7 (L) 13.0 - 17.7 g/dL Final     Hematocrit   Date Value Ref Range Status   04/20/2022 30.5 (L) 37.5 - 51.0 % Final     Platelets   Date Value Ref Range Status   04/20/2022 219 140 - 450 10*3/mm3 Final     Sodium   Date Value Ref Range Status   04/20/2022 138 136 - 145 mmol/L Final     Potassium   Date Value Ref Range Status   04/20/2022 4.4 3.5 - 5.2 mmol/L Final     Chloride   Date Value Ref Range Status   04/20/2022 106 98 - 107 mmol/L Final     CO2   Date Value Ref Range Status   04/20/2022 26.7 22.0 - 29.0 mmol/L Final     BUN   Date Value Ref Range Status   04/20/2022 16 8 - 23 mg/dL Final     Creatinine   Date Value Ref Range Status   04/20/2022 1.12 0.76 - 1.27 mg/dL Final     Glucose   Date Value Ref Range Status   04/20/2022 70 65 - 99 mg/dL Final     Calcium   Date Value Ref Range Status   04/20/2022 8.4 (L) 8.6 - 10.5 mg/dL Final     Magnesium   Date Value Ref Range Status   04/20/2022 2.3 1.6 - 2.4 mg/dL Final     Phosphorus   Date Value Ref Range Status   04/20/2022 2.9 2.5 - 4.5 mg/dL Final     No results found for: AST, ALT, ALKPHOS  INR   Date Value Ref Range Status   04/20/2022 2.07 (H) 0.90 - 1.10 Final     No results found for: COLORU, CLARITYU, SPECGRAV, PHUR, PROTEINUR, GLUCOSEU, KETONESU, BLOODU, NITRITE, LEUKOCYTESUR, BILIRUBINUR, UROBILINOGEN, RBCUA, WBCUA, BACTERIA, UACOMMENT  No results found for: TROPONINT,  TROPONINI, BNP  No components found for: HGBA1C;2  No components found for: TSH;2  Imaging Results (All)     Procedure Component Value Units Date/Time    CT Guided Biopsy Aspiration Injection [[id number]] Collected: 04/11/22 1356     Updated: 04/11/22 1401    Narrative:      PROCEDURE: CT guided left iliopsoas muscle aspiration     HISTORY: Psoas hematoma/ femoral nerve palsy; S70.12XA-Contusion of left  thigh, initial encounter; Z79.01-Long term (current) use of  anticoagulants; Z95.2-Presence of prosthetic heart valve;  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     The procedural risks, benefits, and alternatives were discussed with the  patient. Informed consent was obtained.        The patient was placed in the supine position on the CT procedure table.  Axial CT scan was performed to localize the hematoma in the left  iliopsoas muscle. The overlying skin was prepped and draped in the usual  sterile fashion. 1% buffered lidocaine was used for local anesthesia.     Next, a 5 Syrian Yueh needle was advanced into the collection under CT  guidance. Approximately 1 mL of thick bloody fluid was then aspirated  and sent for requested labs. The needle was removed and sterile dressing  applied. No immediate complications.       Impression:      CT-guided left iliopsoas muscle hematoma aspiration yielded about 1 mL  of thick bloody fluid, nonpurulent. Sample sent for requested labs.                           Radiation dose reduction techniques were utilized, including automated  exposure control and exposure modulation based on body size.     This report was finalized on 4/11/2022 1:58 PM by Dr. Manuel Mclean M.D.       CT Lower Extremity Left Without Contrast [[id number]]  Collected: 04/08/22 1312     Updated: 04/08/22 1338    Narrative:      CT LEFT THIGH WITHOUT CONTRAST     HISTORY: Left lower extremity hematoma. Spontaneous hemorrhage.     TECHNIQUE: CT includes axial imaging through the left thigh from the  level of the top of femoral head to the distal femoral shaft and this  data was reconstructed in coronal and sagittal planes. No contrast  administered.      COMPARISON: CT left lower extremity without contrast 04/06/2022.     FINDINGS: There is soft tissue edema with thickening involving the  subcutaneous fat extending lateral to the left proximal femur extending  along the left thigh. This is nonspecific and may in part represent  hemorrhage.     The distal left iliopsoas muscle is abnormal and contains areas of mixed  internal hyperdensity consistent with hemorrhage into the left iliopsoas  muscle. This could be associated with an avulsion of the left iliopsoas  tendon. No avulsed lesser trochanteric fragment is evident. There is  mild edema extending between the musculature of the left hip and  proximal thigh. There is calcification associated with the left proximal  rectus femoris muscle associated with partial chronic tear or avulsion.  Atherosclerotic calcifications are present involving the iliac and  femoral vasculature.       Impression:      1. Abnormal distal left iliopsoas muscle with areas of mixed internal  increased density representing hemorrhage within the muscle and this may  be associated with a left iliopsoas tear or avulsion. This is new when  compared to the previous CT 03/23/2022. (This anatomy not covered on the  previous CT left lower extremity 04/06/2022).  2. Generalized edema within the subcutaneous fat about the left hip and  thigh greatest involving the lateral subcutaneous fat where there is  swelling. No evidence for organized hematoma within the lateral hip or  thigh soft tissues.  3. Old partial tear or avulsion of the left rectus femoris  muscle.     Radiation dose reduction techniques were utilized, including automated  exposure control and exposure modulation based on body size.     This report was finalized on 4/8/2022 1:35 PM by Dr. Julian Forrester M.D.       CT Lumbar Spine Without Contrast [[id number]] Collected: 04/06/22 0227     Updated: 04/06/22 0246    Narrative:      CT OF THE LUMBAR SPINE     HISTORY: Left lower extremity weakness     COMPARISON: 03/23/2022     TECHNIQUE: Axial CT imaging was obtained through the lumbar spine.  Coronal and sagittal reformatted images were obtained.     FINDINGS:  No acute fracture or subluxation of the lumbar spine is identified.  Intervertebral disc spaces appear relatively well-maintained. Lumbar  vertebral body alignment appears within normal limits.     T12-L1: There is no canal stenosis or neural foraminal narrowing.  L1-L2: There is broad-based disc bulge. This results in narrowing of the  canal, as well as bilateral neural foraminal narrowing, right greater  than left.  L2-L3: There is broad-based disc bulge. This results in canal narrowing,  as well as neural foraminal narrowing, right greater than left.  L3-L4: Disc bulge results in canal stenosis. There is bilateral neural  foraminal narrowing.  L4-L5: There is broad-based disc bulge. There is canal stenosis. There  is bilateral neural foraminal narrowing, left greater than right.  L5-S1: There is no canal stenosis or neural foraminal narrowing.        This patient has stranding which is seen posterior to the left psoas  muscle, which is new when compared to prior exam. There is also  asymmetric enlargement of the patient's left iliacus muscle, with  displacement of the left psoas muscle anteriorly. Appearance is  concerning for hematoma, given history of anticoagulation, although this  is incompletely assessed on this exam. Centrally, there is more focal  area of decreased attenuation. This could reflect some liquefying  hematoma, but  correlation with any evidence of infection is recommended.  There is additional fluid and stranding which is seen within the left  perivesical space. There is some free fluid within the pelvis within the  right hemipelvis. The patient is noted to have a partially visualized  moderate right pleural effusion. There also is some trace pleural fluid  on the left.       Impression:         1. No acute osseous findings.  2. Degenerative changes in the spine, as noted above.  3. Asymmetric enlargement of the patient's left iliacus and left  iliopsoas muscle, incompletely assessed on this exam. Given history of  anticoagulation, this may represent hemorrhage. There are areas of more  central low attenuation, which could reflect hematoma liquefaction, but  correlation with any evidence of infection is recommended. The  hemorrhage does abut the patient's left psoas muscle as well, and there  is also some stranding and fluid seen within the left perivesical space.     FINDINGS were discussed with Dr. Dubose at 2:40 AM.     Radiation dose reduction techniques were utilized, including automated  exposure control and exposure modulation based on body size.     This report was finalized on 4/6/2022 2:43 AM by Dr. Yue Frye M.D.       CT Lower Extremity Left Without Contrast [[id number]] Collected: 04/06/22 0219     Updated: 04/06/22 0230    Narrative:      CT OF THE LEFT LOWER EXTREMITY     HISTORY: Left leg weakness     COMPARISON: 04/05/2022     TECHNIQUE: Axial CT imaging was obtained through the left lower  extremity. Coronal and sagittal reformatted images were obtained.     FINDINGS:  No acute fracture or subluxation of the left knee is seen. There is  diffuse left lower extremity edema. There is a trace amount of patellar  fluid. There is really no significant stranding within the infrapatellar  bursa. Both the patellar and quadriceps tendon appear continuous. Dense  vascular calcifications are noted. The edema  appears to be more  significant on the lateral aspect of the leg.       Impression:         1. No acute osseous findings.  2. Both the patellar tendon and quadriceps tendon appear to be intact.  If concern persists for soft tissue injury, consideration for MRI is  suggested.  3. Diffuse left lower extremity edema.     Radiation dose reduction techniques were utilized, including automated  exposure control and exposure modulation based on body size.     This report was finalized on 4/6/2022 2:27 AM by Dr. Yue Frye M.D.       CT Head Without Contrast [[id number]] Collected: 04/06/22 0214     Updated: 04/06/22 0221    Narrative:      CT HEAD WITHOUT CONTRAST     HISTORY: Left knee weakness     COMPARISON: 10/23/2021     TECHNIQUE: Axial CT imaging was obtained through the brain. No IV  contrast was administered.     FINDINGS:  Images through the posterior fossa are degraded by streak artifact from  dental amalgam. No acute intracranial hemorrhage is seen. There is  diffuse atrophy. There is periventricular and deep white matter  microangiopathic change. No calvarial fracture is seen. Paranasal  sinuses and mastoid air cells appear clear.       Impression:      No acute intracranial findings.     Radiation dose reduction techniques were utilized, including automated  exposure control and exposure modulation based on body size.     This report was finalized on 4/6/2022 2:18 AM by Dr. Yue Frye M.D.       XR Knee 1 or 2 View Left [[id number]] Collected: 04/05/22 2053     Updated: 04/05/22 2057    Narrative:      XR KNEE 1 OR 2 VW LEFT-     Clinical: Left knee pain     FINDINGS: Medial and lateral compartments preserved. There is  patellofemoral joint narrowing. Vascular arterial calcifications within  the soft tissues. No bone lesion, osteochondral defect, fracture or  dislocation seen.     CONCLUSION: Patellofemoral joint degeneration. No acute osseous or  articular abnormality.     This report was  finalized on 4/5/2022 8:54 PM by Dr. Adrian Brand M.D.           Lab Results (last 7 days)     Procedure Component Value Units Date/Time    COVID PRE-OP / PRE-PROCEDURE SCREENING ORDER (NO ISOLATION) - Swab, Nasopharynx [[id number]] Collected: 04/20/22 1201    Specimen: Swab from Nasopharynx Updated: 04/20/22 1209    Narrative:      The following orders were created for panel order COVID PRE-OP / PRE-PROCEDURE SCREENING ORDER (NO ISOLATION) - Swab, Nasopharynx.  Procedure                               Abnormality         Status                     ---------                               -----------         ------                     COVID-19,APTIMA PANTHER(...[[id number]]                      In process                   Please view results for these tests on the individual orders.    COVID-19,APTIMA PANTHER([facility]),[facility], NP/OP SWAB IN UTM/VTM/SALINE TRANSPORT MEDIA,24 HR TAT - Swab, Nasopharynx [[id number]] Collected: 04/20/22 1201    Specimen: Swab from Nasopharynx Updated: 04/20/22 1209    POC Glucose Once [[id number]]  (Normal) Collected: 04/20/22 1116    Specimen: Blood Updated: 04/20/22 1119     Glucose 130 mg/dL      Comment: Meter: [id number] : [id number] Anurag ALEJO       POC Glucose Once [[id number]]  (Abnormal) Collected: 04/20/22 0917    Specimen: Blood Updated: 04/20/22 0919     Glucose 196 mg/dL      Comment: Meter: [id number] : [id number] Anurag ALEJO       Basic Metabolic Panel [[id number]]  (Abnormal) Collected: 04/20/22 0710    Specimen: Blood Updated: 04/20/22 0851     Glucose 70 mg/dL      BUN 16 mg/dL      Creatinine 1.12 mg/dL      Sodium 138 mmol/L      Potassium 4.4 mmol/L      Chloride 106 mmol/L      CO2 26.7 mmol/L      Calcium 8.4 mg/dL      BUN/Creatinine Ratio 14.3     Anion Gap 5.3 mmol/L      eGFR 64.8 mL/min/1.73      Comment: National Kidney Foundation and American Society of Nephrology (ASN) Task Force recommended calculation based on the Chronic Kidney Disease  Epidemiology Collaboration (CKD-EPI) equation refit without adjustment for race.       Narrative:      GFR Normal >60  Chronic Kidney Disease <60  Kidney Failure <15      Protime-INR [[id number]]  (Abnormal) Collected: 04/20/22 0710    Specimen: Blood from Arm, Right Updated: 04/20/22 0846     Protime 23.3 Seconds      INR 2.07    Phosphorus [[id number]]  (Normal) Collected: 04/20/22 0710    Specimen: Blood Updated: 04/20/22 0836     Phosphorus 2.9 mg/dL     Magnesium [[id number]]  (Normal) Collected: 04/20/22 0710    Specimen: Blood Updated: 04/20/22 0836     Magnesium 2.3 mg/dL     CBC (No Diff) [[id number]]  (Abnormal) Collected: 04/20/22 0710    Specimen: Blood Updated: 04/20/22 0820     WBC 3.28 10*3/mm3      RBC 3.64 10*6/mm3      Hemoglobin 9.7 g/dL      Hematocrit 30.5 %      MCV 83.8 fL      MCH 26.6 pg      MCHC 31.8 g/dL      RDW 15.8 %      RDW-SD 47.6 fl      MPV 11.0 fL      Platelets 219 10*3/mm3     POC Glucose Once [[id number]]  (Normal) Collected: 04/20/22 0633    Specimen: Blood Updated: 04/20/22 0634     Glucose 75 mg/dL      Comment: Meter: [id number] : [id number] Gamboa Damon NA       POC Glucose Once [[id number]]  (Abnormal) Collected: 04/19/22 2107    Specimen: Blood Updated: 04/19/22 2108     Glucose 140 mg/dL      Comment: Meter: [id number] : [id number] Gamboa Damon NA       POC Glucose Once [[id number]]  (Normal) Collected: 04/19/22 1631    Specimen: Blood Updated: 04/19/22 1634     Glucose 80 mg/dL      Comment: Meter: [id number] : [id number] Anabella ALEJO       POC Glucose Once [[id number]]  (Abnormal) Collected: 04/19/22 1110    Specimen: Blood Updated: 04/19/22 1112     Glucose 174 mg/dL      Comment: Meter: [id number] : [id number] Anabella ALEJO       Basic Metabolic Panel [[id number]]  (Abnormal) Collected: 04/19/22 0723    Specimen: Blood Updated: 04/19/22 0848     Glucose 96 mg/dL      BUN 14 mg/dL      Creatinine 1.10 mg/dL      Sodium 137 mmol/L      Potassium 4.4  mmol/L      Chloride 105 mmol/L      CO2 27.2 mmol/L      Calcium 8.4 mg/dL      BUN/Creatinine Ratio 12.7     Anion Gap 4.8 mmol/L      eGFR 66.2 mL/min/1.73      Comment: National Kidney Foundation and American Society of Nephrology (ASN) Task Force recommended calculation based on the Chronic Kidney Disease Epidemiology Collaboration (CKD-EPI) equation refit without adjustment for race.       Narrative:      GFR Normal >60  Chronic Kidney Disease <60  Kidney Failure <15      Phosphorus [[id number]]  (Normal) Collected: 04/19/22 0723    Specimen: Blood Updated: 04/19/22 0848     Phosphorus 3.0 mg/dL     Magnesium [[id number]]  (Normal) Collected: 04/19/22 0723    Specimen: Blood Updated: 04/19/22 0848     Magnesium 2.3 mg/dL     Protime-INR [[id number]]  (Abnormal) Collected: 04/19/22 0723    Specimen: Blood Updated: 04/19/22 0835     Protime 21.1 Seconds      INR 1.82    CBC (No Diff) [[id number]]  (Abnormal) Collected: 04/19/22 0723    Specimen: Blood Updated: 04/19/22 0824     WBC 3.10 10*3/mm3      RBC 3.56 10*6/mm3      Hemoglobin 9.3 g/dL      Hematocrit 29.2 %      MCV 82.0 fL      MCH 26.1 pg      MCHC 31.8 g/dL      RDW 15.9 %      RDW-SD 47.7 fl      MPV 10.9 fL      Platelets 217 10*3/mm3     POC Glucose Once [[id number]]  (Normal) Collected: 04/19/22 0609    Specimen: Blood Updated: 04/19/22 0610     Glucose 106 mg/dL      Comment: Meter: [id number] : [id number] Ceasar Gladys NA       POC Glucose Once [[id number]]  (Abnormal) Collected: 04/18/22 2018    Specimen: Blood Updated: 04/18/22 2020     Glucose 147 mg/dL      Comment: Meter: [id number] : [id number] Ceasar Gladys NA       POC Glucose Once [[id number]]  (Normal) Collected: 04/18/22 1559    Specimen: Blood Updated: 04/18/22 1601     Glucose 128 mg/dL      Comment: Meter: [id number] : [id number] Sen Miley NA       POC Glucose Once [[id number]]  (Abnormal) Collected: 04/18/22 1142    Specimen: Blood Updated: 04/18/22 1143     Glucose 199  mg/dL      Comment: RN Notified R and V Meter: [id number] : [id number] Sen ALEJO       Basic Metabolic Panel [[id number]]  (Abnormal) Collected: 04/18/22 0721    Specimen: Blood Updated: 04/18/22 0818     Glucose 124 mg/dL      BUN 16 mg/dL      Creatinine 1.19 mg/dL      Sodium 138 mmol/L      Potassium 4.7 mmol/L      Chloride 105 mmol/L      CO2 26.4 mmol/L      Calcium 8.4 mg/dL      BUN/Creatinine Ratio 13.4     Anion Gap 6.6 mmol/L      eGFR 60.2 mL/min/1.73      Comment: National Kidney Foundation and American Society of Nephrology (ASN) Task Force recommended calculation based on the Chronic Kidney Disease Epidemiology Collaboration (CKD-EPI) equation refit without adjustment for race.       Narrative:      GFR Normal >60  Chronic Kidney Disease <60  Kidney Failure <15      Phosphorus [[id number]]  (Normal) Collected: 04/18/22 0721    Specimen: Blood Updated: 04/18/22 0818     Phosphorus 3.0 mg/dL     Magnesium [[id number]]  (Normal) Collected: 04/18/22 0721    Specimen: Blood Updated: 04/18/22 0818     Magnesium 2.4 mg/dL     Protime-INR [[id number]]  (Abnormal) Collected: 04/18/22 0721    Specimen: Blood Updated: 04/18/22 0810     Protime 18.7 Seconds      INR 1.57    CBC (No Diff) [[id number]]  (Abnormal) Collected: 04/18/22 0721    Specimen: Blood Updated: 04/18/22 0802     WBC 3.15 10*3/mm3      RBC 3.54 10*6/mm3      Hemoglobin 9.2 g/dL      Hematocrit 29.4 %      MCV 83.1 fL      MCH 26.0 pg      MCHC 31.3 g/dL      RDW 15.7 %      RDW-SD 47.6 fl      MPV 11.0 fL      Platelets 214 10*3/mm3     POC Glucose Once [[id number]]  (Normal) Collected: 04/18/22 0611    Specimen: Blood Updated: 04/18/22 0613     Glucose 123 mg/dL      Comment: Meter: [id number] : [id number] Ceasar ALEJO       POC Glucose Once [[id number]]  (Abnormal) Collected: 04/17/22 2035    Specimen: Blood Updated: 04/17/22 2036     Glucose 155 mg/dL      Comment: Meter: [id number] : [id number] Ceasar BENTLEY  Glucose Once [[id number]]  (Abnormal) Collected: 04/17/22 1559    Specimen: Blood Updated: 04/17/22 1601     Glucose 183 mg/dL      Comment: Meter: [id number] : [id number] Anuragbel Baughvaldemar ALEJO       POC Glucose Once [[id number]]  (Abnormal) Collected: 04/17/22 1116    Specimen: Blood Updated: 04/17/22 1118     Glucose 158 mg/dL      Comment: Meter: [id number] : [id number] Anuragbel Baughvaldemar ALEJO       Protime-INR [[id number]]  (Abnormal) Collected: 04/17/22 0838    Specimen: Blood Updated: 04/17/22 0933     Protime 17.4 Seconds      INR 1.44    Basic Metabolic Panel [[id number]]  (Abnormal) Collected: 04/17/22 0838    Specimen: Blood Updated: 04/17/22 0925     Glucose 157 mg/dL      BUN 17 mg/dL      Creatinine 1.29 mg/dL      Sodium 137 mmol/L      Potassium 4.6 mmol/L      Chloride 105 mmol/L      CO2 24.5 mmol/L      Calcium 8.3 mg/dL      BUN/Creatinine Ratio 13.2     Anion Gap 7.5 mmol/L      eGFR 54.7 mL/min/1.73      Comment: National Kidney Foundation and American Society of Nephrology (ASN) Task Force recommended calculation based on the Chronic Kidney Disease Epidemiology Collaboration (CKD-EPI) equation refit without adjustment for race.       Narrative:      GFR Normal >60  Chronic Kidney Disease <60  Kidney Failure <15      Phosphorus [[id number]]  (Normal) Collected: 04/17/22 0838    Specimen: Blood Updated: 04/17/22 0925     Phosphorus 3.0 mg/dL     Magnesium [[id number]]  (Normal) Collected: 04/17/22 0838    Specimen: Blood Updated: 04/17/22 0925     Magnesium 2.3 mg/dL     CBC (No Diff) [[id number]]  (Abnormal) Collected: 04/17/22 0838    Specimen: Blood Updated: 04/17/22 0913     WBC 3.99 10*3/mm3      RBC 3.40 10*6/mm3      Hemoglobin 9.0 g/dL      Hematocrit 29.4 %      MCV 86.5 fL      MCH 26.5 pg      MCHC 30.6 g/dL      RDW 15.8 %      RDW-SD 49.5 fl      MPV 11.1 fL      Platelets 214 10*3/mm3     POC Glucose Once [[id number]]  (Normal) Collected: 04/17/22 0627    Specimen: Blood Updated: 04/17/22  0628     Glucose 129 mg/dL      Comment: Meter: [id number] : [id number] Pavan ALEJO       POC Glucose Once [[id number]]  (Abnormal) Collected: 04/16/22 2054    Specimen: Blood Updated: 04/16/22 2059     Glucose 150 mg/dL      Comment: Meter: [id number] : [id number] Kade ALEJO       POC Glucose Once [[id number]]  (Abnormal) Collected: 04/16/22 1605    Specimen: Blood Updated: 04/16/22 1606     Glucose 148 mg/dL      Comment: Meter: [id number] : [id number] Isaias ALEJO       Basic Metabolic Panel [[id number]]  (Abnormal) Collected: 04/16/22 1014    Specimen: Blood Updated: 04/16/22 1215     Glucose 175 mg/dL      BUN 14 mg/dL      Creatinine 1.18 mg/dL      Sodium 135 mmol/L      Potassium 4.5 mmol/L      Chloride 104 mmol/L      CO2 21.0 mmol/L      Calcium 8.3 mg/dL      BUN/Creatinine Ratio 11.9     Anion Gap 10.0 mmol/L      eGFR 60.8 mL/min/1.73      Comment: National Kidney Foundation and American Society of Nephrology (ASN) Task Force recommended calculation based on the Chronic Kidney Disease Epidemiology Collaboration (CKD-EPI) equation refit without adjustment for race.       Narrative:      GFR Normal >60  Chronic Kidney Disease <60  Kidney Failure <15      Phosphorus [[id number]]  (Normal) Collected: 04/16/22 1014    Specimen: Blood Updated: 04/16/22 1215     Phosphorus 2.7 mg/dL     Magnesium [[id number]]  (Normal) Collected: 04/16/22 1014    Specimen: Blood Updated: 04/16/22 1215     Magnesium 2.2 mg/dL     POC Glucose Once [[id number]]  (Abnormal) Collected: 04/16/22 1137    Specimen: Blood Updated: 04/16/22 1139     Glucose 177 mg/dL      Comment: Meter: [id number] : [id number] Debbi ALEJO       Protime-INR [[id number]]  (Abnormal) Collected: 04/16/22 1014    Specimen: Blood Updated: 04/16/22 1137     Protime 15.1 Seconds      INR 1.20    CBC (No Diff) [[id number]]  (Abnormal) Collected: 04/16/22 1014    Specimen: Blood Updated: 04/16/22 1128     WBC 7.19 10*3/mm3       RBC 3.85 10*6/mm3      Hemoglobin 10.0 g/dL      Hematocrit 32.4 %      MCV 84.2 fL      MCH 26.0 pg      MCHC 30.9 g/dL      RDW 16.6 %      RDW-SD 50.9 fl      MPV 10.6 fL      Platelets 259 10*3/mm3     POC Glucose Once [[id number]]  (Normal) Collected: 04/16/22 0604    Specimen: Blood Updated: 04/16/22 0614     Glucose 108 mg/dL      Comment: Meter: [id number] : [id number] Dave Najera MARCELLUS       POC Glucose Once [[id number]]  (Abnormal) Collected: 04/15/22 2058    Specimen: Blood Updated: 04/15/22 2059     Glucose 152 mg/dL      Comment: Meter: [id number] : [id number] Dave Najera MARCELLUS       Protime-INR [[id number]]  (Abnormal) Collected: 04/15/22 1723    Specimen: Blood Updated: 04/15/22 1758     Protime 15.0 Seconds      INR 1.18    POC Glucose Once [[id number]]  (Abnormal) Collected: 04/15/22 1612    Specimen: Blood Updated: 04/15/22 1613     Glucose 133 mg/dL      Comment: Meter: [id number] : [id number] Sen Trent NA       POC Glucose Once [[id number]]  (Abnormal) Collected: 04/15/22 1123    Specimen: Blood Updated: 04/15/22 1124     Glucose 193 mg/dL      Comment: Meter: [id number] : [id number] Debbi ALEJO       Phosphorus [[id number]]  (Normal) Collected: 04/15/22 0806    Specimen: Blood Updated: 04/15/22 1002     Phosphorus 3.1 mg/dL     Basic Metabolic Panel [[id number]]  (Abnormal) Collected: 04/15/22 0806    Specimen: Blood Updated: 04/15/22 0850     Glucose 103 mg/dL      BUN 12 mg/dL      Creatinine 1.09 mg/dL      Sodium 137 mmol/L      Potassium 4.0 mmol/L      Chloride 104 mmol/L      CO2 26.5 mmol/L      Calcium 8.8 mg/dL      BUN/Creatinine Ratio 11.0     Anion Gap 6.5 mmol/L      eGFR 66.9 mL/min/1.73      Comment: National Kidney Foundation and American Society of Nephrology (ASN) Task Force recommended calculation based on the Chronic Kidney Disease Epidemiology Collaboration (CKD-EPI) equation refit without adjustment for race.       Narrative:      GFR Normal  >60  Chronic Kidney Disease <60  Kidney Failure <15      Magnesium [[id number]]  (Normal) Collected: 04/15/22 0806    Specimen: Blood Updated: 04/15/22 0850     Magnesium 2.4 mg/dL     CBC (No Diff) [[id number]]  (Abnormal) Collected: 04/15/22 0806    Specimen: Blood Updated: 04/15/22 0833     WBC 4.22 10*3/mm3      RBC 3.85 10*6/mm3      Hemoglobin 10.0 g/dL      Hematocrit 31.6 %      MCV 82.1 fL      MCH 26.0 pg      MCHC 31.6 g/dL      RDW 16.1 %      RDW-SD 48.0 fl      MPV 10.8 fL      Platelets 273 10*3/mm3     POC Glucose Once [[id number]]  (Normal) Collected: 04/15/22 0615    Specimen: Blood Updated: 04/15/22 0617     Glucose 92 mg/dL      Comment: Meter: [id number] : [id number] Edgardo ALEJO       POC Glucose Once [[id number]]  (Normal) Collected: 04/14/22 2002    Specimen: Blood Updated: 04/14/22 2003     Glucose 125 mg/dL      Comment: Meter: [id number] : ARTURO Jordan RN       POC Glucose Once [[id number]]  (Normal) Collected: 04/14/22 1550    Specimen: Blood Updated: 04/14/22 1607     Glucose 94 mg/dL      Comment: Meter: [id number] : [id number] Alyssa Pearce Nerisa NA       POC Glucose Once [[id number]]  (Abnormal) Collected: 04/14/22 1114    Specimen: Blood Updated: 04/14/22 1120     Glucose 181 mg/dL      Comment: Meter: [id number] : [id number] Alyssa Ramses Nerisa NA       Body Fluid Culture - Aspirate, Psoas [[id number]] Collected: 04/11/22 1305    Specimen: Aspirate from Psoas Updated: 04/14/22 1007     Body Fluid Culture No growth at 3 days     Gram Stain Few (2+) WBCs per low power field      No organisms seen    Phosphorus [[id number]]  (Normal) Collected: 04/14/22 0848    Specimen: Blood Updated: 04/14/22 0948     Phosphorus 2.8 mg/dL     Basic Metabolic Panel [[id number]]  (Abnormal) Collected: 04/14/22 0848    Specimen: Blood Updated: 04/14/22 0939     Glucose 159 mg/dL      BUN 14 mg/dL      Creatinine 1.01 mg/dL      Sodium 134 mmol/L      Potassium 4.1 mmol/L      Chloride  "103 mmol/L      CO2 24.0 mmol/L      Calcium 8.2 mg/dL      BUN/Creatinine Ratio 13.9     Anion Gap 7.0 mmol/L      eGFR 73.3 mL/min/1.73      Comment: National Kidney Foundation and American Society of Nephrology (ASN) Task Force recommended calculation based on the Chronic Kidney Disease Epidemiology Collaboration (CKD-EPI) equation refit without adjustment for race.       Narrative:      GFR Normal >60  Chronic Kidney Disease <60  Kidney Failure <15      Magnesium [[id number]]  (Normal) Collected: 04/14/22 0848    Specimen: Blood Updated: 04/14/22 0939     Magnesium 2.2 mg/dL     CBC (No Diff) [[id number]]  (Abnormal) Collected: 04/14/22 0848    Specimen: Blood Updated: 04/14/22 0920     WBC 5.01 10*3/mm3      RBC 3.57 10*6/mm3      Hemoglobin 9.3 g/dL      Hematocrit 30.5 %      MCV 85.4 fL      MCH 26.1 pg      MCHC 30.5 g/dL      RDW 15.9 %      RDW-SD 49.0 fl      MPV 10.7 fL      Platelets 271 10*3/mm3     POC Glucose Once [[id number]]  (Normal) Collected: 04/14/22 0542    Specimen: Blood Updated: 04/14/22 0544     Glucose 81 mg/dL      Comment: Meter: [id number] : [id number] Ceasar ALEJO       POC Glucose Once [[id number]]  (Abnormal) Collected: 04/13/22 2022    Specimen: Blood Updated: 04/13/22 2023     Glucose 190 mg/dL      Comment: Meter: [id number] : [id number] Ceasar Quirogail MELO       POC Glucose Once [[id number]]  (Abnormal) Collected: 04/13/22 1550    Specimen: Blood Updated: 04/13/22 1558     Glucose 164 mg/dL      Comment: Meter: [id number] : [id number] Alyssa Jarrettisa NA           /70 (BP Location: Right arm, Patient Position: Lying)   Pulse 83   Temp 98 °F (36.7 °C) (Oral)   Resp 20   Ht 182.9 cm (72\")   Wt 68 kg (150 lb)   SpO2 100%   BMI 20.34 kg/m²     Discharge Exam:  General Appearance:    Alert, cooperative, no distress                          Head:    Normocephalic, without obvious abnormality, atraumatic                          Eyes:                            " Throat:   Lips, tongue, gums normal                          Neck:   Supple, symmetrical, trachea midline, no JVD                        Lungs:     Clear to auscultation bilaterally, respirations unlabored                Chest Wall:    No tenderness or deformity                        Heart:    Regular rate and rhythm, S1 and S2 normal, no murmur,no  Rub or gallop                  Abdomen:     Soft, non-tender, bowel sounds active, no masses, no organomegaly                  Extremities:   Extremities normal, atraumatic, no cyanosis or edema                             Skin:   Skin is warm and dry,  no rashes or palpable lesions                  Neurologic: Left leg weakness     Disposition:  Rehab    Patient Instructions:      Discharge Medications      New Medications      Instructions Start Date   acetaminophen 325 MG tablet  Commonly known as: TYLENOL   650 mg, Oral, Every 4 Hours PRN      hydrocortisone-bacitracin-zinc oxide-nystatin  Commonly known as: MAGIC BARRIER   1 application, Topical, As Needed      insulin glargine 100 UNIT/ML injection  Commonly known as: LANTUS, SEMGLEE   15 Units, Subcutaneous, Nightly      nystatin 388444 UNIT/GM powder  Commonly known as: MYCOSTATIN   Topical, Every 12 Hours Scheduled         Changes to Medications      Instructions Start Date   Digoxin 62.5 MCG tablet  What changed:   · medication strength  · how much to take  · when to take this   62.5 mcg, Oral, Daily Digoxin   Start Date: April 21, 2022     enoxaparin 80 MG/0.8ML solution syringe  Commonly known as: LOVENOX  What changed: when to take this   1 mg/kg (70 mg), Subcutaneous, 2 Times Daily      insulin lispro 100 UNIT/ML injection  Commonly known as: ADMELOG  What changed:   · medication strength  · how much to take  · how to take this  · when to take this  · additional instructions   0-9 Units, Subcutaneous, 3 Times Daily Before Meals      warfarin 5 MG tablet  Commonly known as: COUMADIN  What changed: Another  medication with the same name was added. Make sure you understand how and when to take each.   7.5 mg, Oral, Nightly      PHARMACY TO DOSE WARFARIN  What changed: You were already taking a medication with the same name, and this prescription was added. Make sure you understand how and when to take each.   Does not apply, Continuous PRN         Continue These Medications      Instructions Start Date   atorvastatin 40 MG tablet  Commonly known as: LIPITOR   40 mg, Oral, Daily      diphenhydrAMINE 25 mg capsule  Commonly known as: BENADRYL   25 mg, Oral, 2 Times Daily PRN      famotidine 20 MG tablet  Commonly known as: PEPCID   20 mg, Oral, 2 Times Daily Before Meals      ferrous gluconate 324 MG tablet  Commonly known as: FERGON   324 mg, Oral, Every Other Day      fish oil 1000 MG capsule capsule   4,000 mg, Oral, Daily With Breakfast      lactobacillus acidophilus capsule capsule   1 capsule, Oral, Daily      magnesium oxide 400 MG tablet  Commonly known as: MAG-OX   400 mg, Oral, 2 Times Daily      metoprolol succinate XL 25 MG 24 hr tablet  Commonly known as: TOPROL-XL   12.5 mg, Oral, Daily      saccharomyces boulardii 250 MG capsule  Commonly known as: FLORASTOR   250 mg, Oral, 2 Times Daily      V-Go 40 kit   USE AS DIRECTED THREE TIMES A DAY         Stop These Medications    aspirin 81 MG EC tablet     furosemide 40 MG tablet  Commonly known as: Lasix     TRESIBA FLEXTOUCH SC     vancomycin 125 MG capsule  Commonly known as: VANCOCIN          Future Appointments   Date Time Provider Department Center   8/3/2022  8:00 AM Griffin Fried MD [facility]   9/22/2022  8:00 AM Rubin Hinkle APRN [facility]      Follow-up Information     Rubin Hinkle APRN Follow up.    Specialties: Family Medicine, Urgent Care, Emergency Medicine  Why: After released from rehab  Contact information:  [address] [facility] [address] [phone]                       Discharge Order (From admission, onward)     None        Discharge Order (From admission, onward)     Start     Ordered    04/20/22 1317  Discharge readmit patient  Once        Expected Discharge Date: 04/20/22    Discharge Disposition: Rehab Facility or Unit ([facility])    Physician of Record for Attribution - Please select from Treatment Team: LEOBARDO JUAREZ [[id number]]    Review needed by CMO to determine Physician of Record: No       Question Answer Comment   Physician of Record for Attribution - Please select from Treatment Team LEOBARDO JUAREZ    Review needed by CMO to determine Physician of Record No        04/20/22 1317    04/20/22 1304  Discharge patient  Once        Expected Discharge Date: 04/20/22    Discharge Disposition: Rehab Facility or Unit ([facility])    Physician of Record for Attribution - Please select from Treatment Team: LEOBARDO JUAREZ [[id number]]    Review needed by CMO to determine Physician of Record: No       Question Answer Comment   Physician of Record for Attribution - Please select from Treatment Team LEOBARDO JUAREZ    Review needed by CMO to determine Physician of Record No        04/20/22 1316                  Total time spent discharging patient including evaluation,post hospitalization follow up,  medication and post hospitalization instructions and education total time exceeds 30 minutes.    Signed:  Leobardo Juarez MD  4/20/2022  13:18 EDT    Electronically signed by Leobardo Juarez MD at 04/20/22 1324       Discharge Order (From admission, onward)     Start     Ordered    04/20/22 1317  Discharge readmit patient  Once        Expected Discharge Date: 04/20/22    Discharge Disposition: Rehab Facility or Unit ([facility])    Physician of Record for Attribution - Please select from Treatment Team: LEOBARDO JUAREZ [[id number]]    Review needed by CMO to determine Physician of Record: No       Question Answer Comment   Physician of Record for Attribution - Please select from Treatment Team LEOBARDO JUAREZ     Review needed by CMO to determine Physician of Record No        04/20/22 1317    04/20/22 1304  Discharge patient  Once        Expected Discharge Date: 04/20/22    Discharge Disposition: Rehab Facility or Unit ([facility])    Physician of Record for Attribution - Please select from Treatment Team: KIEL ABREU [[id number]]    Review needed by CMO to determine Physician of Record: No       Question Answer Comment   Physician of Record for Attribution - Please select from Treatment Team KIEL ABREU    Review needed by CMO to determine Physician of Record No        04/20/22 1312

## 2022-04-21 NOTE — THERAPY EVALUATION
Inpatient Rehabilitation - Occupational Therapy Initial Evaluation    [facility]     Patient Name: Francisco Sequeira  : 1935  MRN: [id number]    Today's Date: 2022                 Admit Date: 2022         ICD-10-CM ICD-9-CM   1. 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)   • Iron deficiency anemia   • Chronic anticoagulation   • Hemiparesis of left nondominant side as late effect of cerebral infarction (HCC)   • Rectus sheath hematoma   • Sepsis (HCC)   • Calculus of gallbladder with acute cholecystitis without obstruction   • History of Clostridium difficile infection   • Aspiration pneumonitis (HCC)   • Hematoma of iliopsoas muscle, left, initial encounter   • History of CVA (cerebrovascular accident)   • S/P laparoscopic cholecystectomy   • Anemia   • Hematoma of left iliopsoas muscle       Past Medical History:   Diagnosis Date   • Allergic rhinitis    • Aortic valve insufficiency    • Ascending aortic aneurysm (HCC)    • Atrial fibrillation (HCC)    • Bacteremia    • Calcific aortic stenosis of bicuspid valve    • Cardiac arrest (HCC)    • Cardiomyopathy (HCC)    • CKD (chronic kidney disease) stage 3, GFR 30-59 ml/min (HCC)    • Contact dermatitis due to poison ivy    • Elbow fracture    • Esophageal reflux    • GERD (gastroesophageal reflux disease)    • Head injury    • History of transfusion    • Hyperglycemia    • Hyperlipidemia    • Hypertension    • Kidney carcinoma (HCC)    • Nonischemic  cardiomyopathy (HCC)    • Renal oncocytoma    • Seasonal allergic reaction    • Sinusitis    • Syncope    • Type 2 diabetes mellitus (HCC)     uncontrolled   • Visual field defect        Past Surgical History:   Procedure Laterality Date   • AORTIC VALVE REPAIR/REPLACEMENT     • ASCENDING AORTIC ANEURYSM REPAIR W/ MECHANICAL AORTIC VALVE REPLACEMENT     • CHOLECYSTECTOMY WITH INTRAOPERATIVE CHOLANGIOGRAM N/A 3/29/2022    Procedure: Laparoscopic cholecystectomy with cholangiogram, possible open;  Surgeon: Lisa Guidry MD;  Location: [facility] MAIN OR;  Service: General;  Laterality: N/A;   • COLONOSCOPY N/A 10/28/2021    Procedure: COLONOSCOPY to cecum:  cold snare polyps,;  Surgeon: Dinesh Meza MD;  Location: [facility] ENDOSCOPY;  Service: Gastroenterology;  Laterality: N/A;  pre:  Iron deficiency anemia  post:  polyps, diverticulosis,    • COLONOSCOPY N/A 11/9/2021    Procedure: COLONOSCOPY to cecum with APC cautery of AVM and clip placement x1;  Surgeon: Pavan Rodriguez MD;  Location: [facility] ENDOSCOPY;  Service: Gastroenterology;  Laterality: N/A;  PRE - gi bleed, anemia  POST - diverticulosis, poor prep, AVM right colon   • ENDOSCOPY N/A 10/28/2021    Procedure: ESOPHAGOGASTRODUODENOSCOPY with biopsies;  Surgeon: Dinesh Meza MD;  Location: [facility] ENDOSCOPY;  Service: Gastroenterology;  Laterality: N/A;  pre:  Iron deficiency anemia  post:  duodenitis and gastritis   • EYE SURGERY  12/09/2020    cataract surgery    • NEPHRECTOMY     • OTHER SURGICAL HISTORY      elbow surgery   • OTHER SURGICAL HISTORY      right arm surgery   • PROSTATE SURGERY     • THORACENTESIS Left     diagnostic             IRF OT ASSESSMENT FLOWSHEET (last 12 hours)     IRF OT Evaluation and Treatment     Row Name 04/21/22 1514          OT Time and Intention    Document Type initial evaluation  -DN     Mode of Treatment occupational therapy  -DN     Patient Effort good  -DN     Symptoms Noted During/After Treatment  fatigue  LLE weaker as tx went on  -DN     Row Name 04/21/22 1514          General Information    Patient Profile Reviewed yes  -DN     Patient/Family/Caregiver Comments/Observations wife present in room during morning adls  -DN     General Observations of Patient pt willing to work  -DN     Existing Precautions/Restrictions fall  -DN     Row Name 04/21/22 1514          Previous Level of Function/Home Environm    BADLs, Premorbid Functional Level partial assistance  -DN     Transfers, Premorbid Functional Level uses device or equipment;independent  -DN     Row Name 04/21/22 1514          Living Environment    Current Living Arrangements home  -DN     Home Accessibility stairs to enter home  -DN     People in Home spouse  -DN     Name(s) of People in Home Gladys  -DN     Primary Care Provided by spouse/significant other;self  -DN     Row Name 04/21/22 1514          Home Main Entrance    Number of Stairs, Main Entrance two  -DN     Row Name 04/21/22 1514          Home Use of Assistive/Adaptive Equipment    Equipment Currently Used at Home shower chair;walker, standard;cane, straight  3 in 1 commode  -DN     Row Name 04/21/22 1514          Pain Assessment    Pretreatment Pain Rating 0/10 - no pain  -DN     Posttreatment Pain Rating 0/10 - no pain  -DN     Row Name 04/21/22 1514          Cognition/Psychosocial    Affect/Mental Status (Cognition) WFL  -DN     Orientation Status (Cognition) oriented x 4  -DN     Personal Safety Interventions fall prevention program maintained;gait belt  -DN     Safety Deficit (Cognition) judgment;insight into deficits/self-awareness  -DN     Row Name 04/21/22 1514          Range of Motion Comprehensive    General Range of Motion bilateral upper extremity ROM WFL  -DN     Row Name 04/21/22 1514          Strength (Manual Muscle Testing)    Strength (Manual Muscle Testing) left upper extremity strength;right upper extremity strength  -DN     Left Upper Extremity Strength --  4/5---4-/5 range   -DN     Right Upper Extremity Strength --  4-/5 to 4/5 range  -DN     Right Hand, Setting 2 (Dynamometer Testing) 54  -DN     Comment, Right Hand (Dynamometer Testing) 45  -DN     Left Hand: Lateral (Key) Pinch Strength (Pinch Dynamometer Testing) 15  -DN     Left Hand: [place] ([place]) Pinch Strength (Pinch Dynamometer Testing) 13  -DN     Comment, Left Hand (Pinch Dynamometer Testing) 13  -DN     Right Hand: Lateral (Key) Pinch Strength (Pinch Dynamometer Testing) 14  -DN     Right Hand: [place] (Niko) Pinch Strength (Pinch Dynamometer Testing) 13  -DN     Row Name 04/21/22 1514          Vision Assessment/Intervention    Visual Impairment/Limitations WNL;corrective lenses full-time  -DN     Row Name 04/21/22 1514          Sensory Assessment (Somatosensory)    Sensory Assessment (Somatosensory) UE sensation intact  -DN     Row Name 04/21/22 1514          Bathing    [place] Level (Bathing) bathing skills;moderate assist (50% patient effort)  -DN     Position (Bathing) supported sitting;unsupported sitting  -DN     Row Name 04/21/22 1514          Upper Body Dressing    [place] Level (Upper Body Dressing) upper body dressing skills;minimum assist (75% or more patient effort)  -DN     Row Name 04/21/22 1514          Lower Body Dressing    [place] Level (Lower Body Dressing) doff;don;pants/bottoms;shoes/slippers;socks;underwear;dependent (less than 25% patient effort)  -DN     Position (Lower Body Dressing) supported sitting;supported standing  -DN     Set-up Assistance (Lower Body Dressing) obtain clothing  -DN     Comment (Lower Body Dressing) 2 persons to stand  -DN     Row Name 04/21/22 1514          Grooming    [place] Level (Grooming) grooming skills;supervision  -DN     Position (Grooming) sink side;supported sitting  -DN     Row Name 04/21/22 1514          Toileting    [place] Level (Toileting) toileting skills;adjust/manage clothing;perform perineal hygiene;dependent (less than  25% patient effort)  -DN     Assistive Device Use (Toileting) grab bar/safety frame;raised toilet seat  -DN     Position (Toileting) supported standing;supported sitting  -DN     Set-up Assistance (Toileting) change pad/brief  -DN     Comment (Toileting) 2 persons to stand  -DN     Row Name 04/21/22 1514          Shoulder (Therapeutic Exercise)    Shoulder (Therapeutic Exercise) strengthening exercise  -DN     Shoulder AROM (Therapeutic Exercise) bilateral;10 repetitions;3 sets  -DN     Shoulder Strengthening (Therapeutic Exercise) resistance band;yellow  -DN     Row Name 04/21/22 1514          Hand (Therapeutic Exercise)    Hand (Therapeutic Exercise) strengthening exercise  -DN     Hand Strengthening (Therapeutic Exercise) bilateral;therapy putty;red  -DN     Row Name 04/21/22 1514          Balance    Static Sitting Balance supervision  -DN     Dynamic Sitting Balance standby assist  -DN     Sit to Stand Dynamic Balance minimal assist  -DN     Static Standing Balance minimal assist  -DN     Comment, Balance mostly decreased strength  -DN     Row Name 04/21/22 1514          Positioning and Restraints    Pre-Treatment Position sitting in chair/recliner  -DN     Post Treatment Position bed  -DN     In Bed call light within reach;encouraged to call for assist;exit alarm on;with family/caregiver  -DN     Row Name 04/21/22 1514          Therapy Assessment/Plan (OT)    Functional Level at Time of Evaluation (OT) pt presnts with overall decreased strenght throughout more so LLE, decreased endurance all of which present with decreased transfer and adls and would benifit from skilled OT services prior to d/c  -DN     OT Diagnosis IS due to L Iliopsoas hematima  -DN     Rehab Potential/Prognosis (OT) good, to achieve stated therapy goals  -DN     Frequency of Treatment (OT) 5 times per week  -DN     Estimated Duration of Therapy (OT) 3 weeks;4 weeks  -DN     Problem List (OT) problems related to;strength  -DN     Activity  Limitations Related to Problem List (OT) unable to ambulate safely;unable to transfer safely;BADLs not performed adequately or safely  -DN     Planned Therapy Interventions (OT) activity tolerance training;adaptive equipment training;BADL retraining;ROM/therapeutic exercise;strengthening exercise;transfer/mobility retraining  -DN     Row Name 04/21/22 1514          IRF OT Goals    Bathing Goal Selection (OT-IRF) bathing, OT goal 1;bathing, OT goal 2  -DN     UB Dressing Goal Selection (OT-IRF) UB dressing, OT goal 1;UB dressing, OT goal 2  -DN     LB Dressing Goal Selection (OT-IRF) LB dressing, OT goal 1;LB dressing, OT goal 2  -DN     Grooming Goal Selection (OT-IRF) grooming, OT goal 1;grooming, OT goal 2  -DN     Toileting Goal Selection (OT-IRF) toileting, OT goal 1;toileting, OT goal 2  -DN     Strength Goal Selection (OT-IRF) strength, OT goal 2 (free text);strength, OT goal 1 (free text)  -DN     Endurance Goal Selection (OT) endurance, OT goal 1;endurance, OT goal 2  -DN     Caregiver Training Goal Selection (OT-IRF) caregiver training, OT goal 1;caregiver training, OT goal 2  -DN     Row Name 04/21/22 1514          Bathing Goal 1 (OT-IRF)    Activity/Device (Bathing Goal 1, OT-IRF) bathing skills, all;grab bar, tub/shower  -DN     [place] Level (Bathing Goal 1, OT-IRF) minimum assist (75% or more patient effort)  -DN     Time Frame (Bathing Goal 1, OT-IRF) short-term goal (STG)  -DN     Progress/Outcomes (Bathing Goal 1, OT-IRF) continuing progress toward goal  -DN     Row Name 04/21/22 1514          Bathing Goal 2 (OT-IRF)    Activity/Device (Bathing Goal 2, OT-IRF) bathing skills, all  -DN     [place] Level (Bathing Goal 2, OT-IRF) contact guard required  -DN     Time Frame (Bathing Goal 2, OT-IRF) long-term goal (LTG)  -DN     Progress/Outcomes (Bathing Goal 2, OT-IRF) continuing progress toward goal  -DN     Row Name 04/21/22 1514          UB Dressing Goal 1 (OT-IRF)    Activity/Device (UB  Dressing Goal 1, OT-IRF) upper body dressing  -DN     [place] (UB Dress Goal 1, OT-IRF) set-up required  -DN     Time Frame (UB Dressing Goal 1, OT-IRF) short-term goal (STG)  -DN     Progress/Outcomes (UB Dressing Goal 1, OT-IRF) continuing progress toward goal  -DN     Row Name 04/21/22 1514          UB Dressing Goal 2 (OT-IRF)    Activity/Device (UB Dressing Goal 2, OT-IRF) upper body dressing  -DN     [place] (UB Dress Goal 2, OT-IRF) set-up required  -DN     Time Frame (UB Dressing Goal 2, OT-IRF) long-term goal (LTG)  -DN     Progress/Outcomes (UB Dressing Goal 2, OT-IRF) continuing progress toward goal  -DN     Row Name 04/21/22 1514          LB Dressing Goal 1 (OT-IRF)    Activity/Device (LB Dressing Goal 1, OT-IRF) lower body dressing  -DN     [place] (LB Dressing Goal 1, OT-IRF) maximum assist (25-49% patient effort)  -DN     Time Frame (LB Dressing Goal 1, OT-IRF) short-term goal (STG)  -DN     Progress/Outcomes (LB Dressing Goal 1, OT-IRF) continuing progress toward goal  -DN     Row Name 04/21/22 1514          LB Dressing Goal 2 (OT-IRF)    Activity/Device (LB Dressing Goal 2, OT-IRF) lower body dressing  -DN     [place] (LB Dressing Goal 2, OT-IRF) minimum assist (75% or more patient effort)  -DN     Time Frame (LB Dressing Goal 2, OT-IRF) long-term goal (LTG)  -DN     Progress/Outcomes (LB Dressing Goal 2, OT-IRF) continuing progress toward goal  -DN     Row Name 04/21/22 1514          Grooming Goal 1 (OT-IRF)    Activity/Device (Grooming Goal 1, OT-IRF) grooming skills, all  -DN     [place] (Grooming Goal 1, OT-IRF) set-up required  -DN     Time Frame (Grooming Goal 1, OT-IRF) short-term goal (STG)  -DN     Progress/Outcomes (Grooming Goal 1, OT-IRF) continuing progress toward goal  -DN     Row Name 04/21/22 1514          Grooming Goal 2 (OT-IRF)    Activity/Device (Grooming Goal 2, OT-IRF) grooming skills, all  -DN     [place] (Grooming Goal 2, OT-IRF) set-up required   -DN     Time Frame (Grooming Goal 2, OT-IRF) long-term goal (LTG)  -DN     Progress/Outcomes (Grooming Goal 2, OT-IRF) continuing progress toward goal  -DN     Row Name 04/21/22 1514          Toileting Goal 1 (OT-IRF)    Activity/Device (Toileting Goal 1, OT-IRF) toileting skills, all  -DN     [place] Level (Toileting Goal 1, OT-IRF) maximum assist (25-49% patient effort)  -DN     Progress/Outcomes (Toileting Goal 1, OT-IRF) continuing progress toward goal  -DN     Time Frame (Toileting Goal 1, OT-IRF) short-term goal (STG)  -DN     Row Name 04/21/22 1514          Toileting Goal 2 (OT-IRF)    Activity/Device (Toileting Goal 2, OT-IRF) toileting skills, all  -DN     [place] Level (Toileting Goal 2, OT-IRF) contact guard required  -DN     Progress/Outcomes (Toileting Goal 2, OT-IRF) continuing progress toward goal  -DN     Time Frame (Toileting Goal 2, OT-IRF) long-term goal (LTG)  -DN     Row Name 04/21/22 1514          Strength Goal 1 (OT-IRF)    Strength Goal 1 (OT-IRF) pt to be SBA with red theraputty exercises  -DN     Time Frame (Strength Goal 1, OT-IRF) short-term goal (STG)  -DN     Progress/Outcomes (Strength Goal 1, OT-IRF) continuing progress toward goal  -DN     Row Name 04/21/22 1514          Strength Goal 2 (OT-IRF)    Strength Goal 2 (OT-IRF) pt to be independent with BUE HEP  -DN     Time Frame (Strength Goal 2, OT-IRF) long-term goal (LTG)  -DN     Progress/Outcomes (Strength Goal 2, OT-IRF) continuing progress toward goal  -DN     Row Name 04/21/22 1514           Endurance Goal 1 (OT)    Endurance Goal 1 (OT) during adls  -DN     Activity Level (Endurance Goal 1, OT) endurance 2 fair +  -DN     Time Frame (Endurance Goal 1, OT) short term goal (STG)  -DN     Progress/Outcome (Endurance Goal 1, OT) continuing progress toward goal  -DN     Row Name 04/21/22 1514          Endurance Goal 2 (OT)    Endurance Goal 2 (OT) during adls  -DN     Activity Level (Endurance Goal 2, OT) endurance 2 good  -  -DN     Time Frame (Endurance Goal 2, OT) long term goal (LTG)  -DN     Progress/Outcome (Endurance Goal 2, OT) continuing progress toward goal  -DN     Row Name 04/21/22 1514          Caregiver Training Goal 1 (OT-IRF)    Caregiver Training Goal 1 (OT-IRF) pt to be SBA with BUE strength to increase independence with adls  -DN     Time Frame (Caregiver Training Goal 1, OT-IRF) short-term goal (STG)  -DN     Progress/Outcomes (Caregiver Training Goal 1, OT-IRF) continuing progress toward goal  -DN     Row Name 04/21/22 1514          Caregiver Training Goal 2 (OT-IRF)    Caregiver Training Goal 2 (OT-IRF) pt wife to be independent with assist of adls with self care and transfers at time of d/c  -DN     Time Frame (Caregiver Training Goal 2, OT-IRF) long-term goal (LTG)  -DN     Progress/Outcomes (Caregiver Training Goal 2, OT-IRF) continuing progress toward goal  -DN           User Key  (r) = Recorded By, (t) = Taken By, (c) = Cosigned By    Initials Name Effective Dates    DN Francisco Jane, OT 06/16/21 -                  Occupational Therapy Education                 Title: PT OT SLP Therapies (In Progress)     Topic: Occupational Therapy (Not Started)     Point: ADL training (Not Started)     Description:   Instruct learner(s) on proper safety adaptation and remediation techniques during self care or transfers.   Instruct in proper use of assistive devices.              Learner Progress:  Not documented in this visit.          Point: Home exercise program (Not Started)     Description:   Instruct learner(s) on appropriate technique for monitoring, assisting and/or progressing therapeutic exercises/activities.              Learner Progress:  Not documented in this visit.          Point: Precautions (Not Started)     Description:   Instruct learner(s) on prescribed precautions during self-care and functional transfers.              Learner Progress:  Not documented in this visit.          Point: Body mechanics  (Not Started)     Description:   Instruct learner(s) on proper positioning and spine alignment during self-care, functional mobility activities and/or exercises.              Learner Progress:  Not documented in this visit.                                OT Recommendation and Plan    Planned Therapy Interventions (OT): activity tolerance training, adaptive equipment training, BADL retraining, ROM/therapeutic exercise, strengthening exercise, transfer/mobility retraining                    Time Calculation:      Time Calculation- OT     Row Name 04/21/22 1400 04/21/22 0930          Time Calculation- OT    OT Start Time 1400  -DN 0930  -DN     OT Stop Time 1430  -DN 1030  -DN     OT Time Calculation (min) 30 min  -DN 60 min  -DN           User Key  (r) = Recorded By, (t) = Taken By, (c) = Cosigned By    Initials Name Provider Type    Francisco Batista OT Occupational Therapist              Therapy Charges for Today     Code Description Service Date Service Provider Modifiers Qty    [id number]  OT EVAL MOD COMPLEXITY 3 4/21/2022 Francisco Jane OT GO 1    [id number]  OT SELF CARE/MGMT/TRAIN EA 15 MIN 4/21/2022 Francisco Jane OT GO 4                   Francisco Jane OT  4/21/2022

## 2022-04-21 NOTE — CONSULTS
Nutrition Services      Patient Name: Francisco Sequeira  YOB: 1935  MRN: [id number]  Admission date: 4/20/2022    Comment: Pt seen at lunch time. Wife present. Both discussed pt's significant weight loss over the past year (50 lb), which has been unintentional and related to multiple hospitalizations (Gi bleed, c.diff, gallbladder). -190 lb range, currently listed at 132 lb. NFPE reveals severe muscle wasting and fat loss. He has a stage 2 on his coccyx.     Will liberalize pt's diet to consistent carb only. Wife has brought in some snack foods and Diet Coke. Provided the Always Available menu and obtained meal preferences for the next 2 days. He declines any type of ONS at this time. Will continue to monitor.       CLINICAL NUTRITION ASSESSMENT      Reason for Assessment Rehab admission assessment  Pressure injury/skin status      H&P  86 y.o. male former smoker with a history of valvular heart disease, afib, chronic AC, CKD, DM, HTN, NICM, and multiple medical issues that presents to [facility] complaining of LLE weakness, swelling on 4/5/22. He was discharged from [facility] 4/4/22 following hospitalization for acute cholecystitis s/p lap cholecystectomy 3/29/22 and aspiration pneumonia. He was discharged on lovenox bridge and warfarin for subtherapeutic INR. Patient had hematoma of the left iliopsoas muscle.  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  There was attempt to aspirate hematoma but were not able to obtain any material    Past Medical History:   Diagnosis Date   • Allergic rhinitis    • Aortic valve insufficiency    • Ascending aortic aneurysm (HCC)    • Atrial fibrillation (HCC)    • Bacteremia    • Calcific aortic stenosis of bicuspid valve    • Cardiac arrest (HCC)    • Cardiomyopathy (HCC)    • CKD (chronic kidney disease) stage 3, GFR 30-59 ml/min ([facility])    • Contact dermatitis due to poison ivy    • Elbow fracture    • Esophageal reflux     • GERD (gastroesophageal reflux disease)    • Head injury    • History of transfusion    • Hyperglycemia    • Hyperlipidemia    • Hypertension    • Kidney carcinoma (HCC)    • Nonischemic cardiomyopathy (HCC)    • Renal oncocytoma    • Seasonal allergic reaction    • Sinusitis    • Syncope    • Type 2 diabetes mellitus (HCC)     uncontrolled   • Visual field defect        Past Surgical History:   Procedure Laterality Date   • AORTIC VALVE REPAIR/REPLACEMENT     • ASCENDING AORTIC ANEURYSM REPAIR W/ MECHANICAL AORTIC VALVE REPLACEMENT     • CHOLECYSTECTOMY WITH INTRAOPERATIVE CHOLANGIOGRAM N/A 3/29/2022    Procedure: Laparoscopic cholecystectomy with cholangiogram, possible open;  Surgeon: Lisa Guidry MD;  Location: [facility] MAIN OR;  Service: General;  Laterality: N/A;   • COLONOSCOPY N/A 10/28/2021    Procedure: COLONOSCOPY to cecum:  cold snare polyps,;  Surgeon: Dinesh Meza MD;  Location: [facility] ENDOSCOPY;  Service: Gastroenterology;  Laterality: N/A;  pre:  Iron deficiency anemia  post:  polyps, diverticulosis,    • COLONOSCOPY N/A 11/9/2021    Procedure: COLONOSCOPY to cecum with APC cautery of AVM and clip placement x1;  Surgeon: Pavan Rodriguez MD;  Location: [facility] ENDOSCOPY;  Service: Gastroenterology;  Laterality: N/A;  PRE - gi bleed, anemia  POST - diverticulosis, poor prep, AVM right colon   • ENDOSCOPY N/A 10/28/2021    Procedure: ESOPHAGOGASTRODUODENOSCOPY with biopsies;  Surgeon: Dinesh Meza MD;  Location: [facility] ENDOSCOPY;  Service: Gastroenterology;  Laterality: N/A;  pre:  Iron deficiency anemia  post:  duodenitis and gastritis   • EYE SURGERY  12/09/2020    cataract surgery    • NEPHRECTOMY     • OTHER SURGICAL HISTORY      elbow surgery   • OTHER SURGICAL HISTORY      right arm surgery   • PROSTATE SURGERY     • THORACENTESIS Left     diagnostic        Current Problems   Weakness following acute hospitalization       Encounter Information        Nutrition/Diet  "History:    Intake improved for past few days, intake % on acute. Reported reduced oral intake over the last year and weight loss of about 50 lb, unintentional.      Typical Intake:    Food Preferences: Doesn't like salads. Prefers Diet Coke > Diet Pepsi. Loves tomato soup and will consume Q lunch, dinner if provided.    Meal/Snack Patterns:    Supplements:    Typical Activity Pattern:    Factors Affecting Intake: Weakness, generalized deconditioning/weakness   Tests/Procedures:      Anthropometrics        Current Height  Current Weight Height: 182.9 cm (72\")  Weight: 60.2 kg (132 lb 11.5 oz) (04/20/22 1639)       Admission Weight 156 lb (4/6 stated), vs 132 lb (4/20 bed scale on rehab)   Ideal Body Weight (IBW) 178 lb   Usual Body Weight (UBW) 180-190 lb in 2021, has been in 150 lb range more recently and down from that with his recent gallbladder & psoas issues       Trending Weight Hx     Weight Change see below - wide variance within past month             PTA:     Wt Readings from Last 30 Encounters:   04/20/22 1639 60.2 kg (132 lb 11.5 oz)   04/11/22 1202 68 kg (150 lb)   04/08/22 1241 68.5 kg (151 lb 0.2 oz)   04/06/22 0047 70.8 kg (156 lb)   04/04/22 0510 71.1 kg (156 lb 12 oz)   04/03/22 0540 71.7 kg (158 lb 1.1 oz)   04/02/22 0518 72.1 kg (158 lb 15.2 oz)   04/01/22 0520 71.9 kg (158 lb 8.2 oz)   03/31/22 0654 71 kg (156 lb 8.4 oz)   03/30/22 0638 75.2 kg (165 lb 12.6 oz)   03/29/22 0535 70.3 kg (155 lb)   03/28/22 0540 69.4 kg (153 lb 1.6 oz)   03/27/22 0527 69.2 kg (152 lb 9.6 oz)   03/26/22 0132 68 kg (149 lb 14.4 oz)   03/25/22 0530 66.8 kg (147 lb 4.8 oz)   03/24/22 0455 65.3 kg (143 lb 15.4 oz)   03/23/22 1529 65.3 kg (144 lb)   03/23/22 1316 71 kg (156 lb 9.6 oz)   03/21/22 0815 66.2 kg (146 lb)   03/16/22 0853 66.2 kg (146 lb)   03/15/22 0840 66.6 kg (146 lb 14.4 oz)   03/11/22 1117 (!) 144 kg (317 lb 7.4 oz)   03/07/22 1404 67.1 kg (148 lb)   03/02/22 0509 62.7 kg (138 lb 4.8 oz)   03/01/22 " 0549 64.6 kg (142 lb 6.4 oz)   02/28/22 0500 63.6 kg (140 lb 3.2 oz)   02/27/22 0541 68 kg (149 lb 14.6 oz)   02/26/22 0609 66.8 kg (147 lb 4.3 oz)   02/25/22 0529 66.6 kg (146 lb 14.4 oz)   02/24/22 0620 68.6 kg (151 lb 3.2 oz)   02/23/22 0500 69.4 kg (153 lb)   02/22/22 1818 71.6 kg (157 lb 12.8 oz)   02/15/22 1510 68 kg (149 lb 14.6 oz)   02/15/22 0614 68 kg (150 lb)   01/31/22 0737 68 kg (150 lb)   01/28/22 1350 68 kg (150 lb)   12/20/21 0854 73.2 kg (161 lb 4.8 oz)   11/30/21 0956 70.8 kg (156 lb)   11/19/21 1356 70.8 kg (156 lb)   11/16/21 0500 71 kg (156 lb 9.6 oz)   11/15/21 0523 67.1 kg (147 lb 14.9 oz)   11/14/21 0548 72.5 kg (159 lb 13.3 oz)   11/13/21 1600 73.6 kg (162 lb 4.8 oz)   11/02/21 0344 81.6 kg (180 lb)   10/24/21 1428 85.3 kg (188 lb)   10/23/21 2010 85.3 kg (188 lb 0.8 oz)   10/23/21 1541 81.6 kg (180 lb)   07/30/21 1133 86.2 kg (190 lb)   06/02/21 0820 83.3 kg (183 lb 9.6 oz)   04/13/21 0759 82.6 kg (182 lb)   03/02/21 0811 83.6 kg (184 lb 6.4 oz)   12/17/20 1258 81.8 kg (180 lb 6.4 oz)   11/15/20 0845 81.6 kg (180 lb)   08/28/20 1523 83.7 kg (184 lb 9.6 oz)   07/29/20 1105 82.6 kg (182 lb)   05/15/20 0807 83.9 kg (185 lb)   03/12/20 1102 81.6 kg (180 lb)   02/14/20 0738 82.9 kg (182 lb 12.8 oz)   01/19/20 0547 93.9 kg (207 lb 1.6 oz)   01/18/20 0521 79.5 kg (175 lb 4.8 oz)   01/17/20 0500 78.9 kg (174 lb)   01/16/20 0500 76.3 kg (168 lb 3.2 oz)   01/15/20 0510 77.1 kg (169 lb 14.4 oz)   01/14/20 0500 82.2 kg (181 lb 3.5 oz)   01/13/20 1843 81 kg (178 lb 9.2 oz)   01/13/20 0714 81.6 kg (180 lb)   11/15/19 0734 81.6 kg (179 lb 12.8 oz)   08/15/19 0733 81.2 kg (179 lb 1.6 oz)      BMI kg/m2 Body mass index is 18 kg/m².       Labs       Pertinent Labs    Results from last 7 days   Lab Units 04/20/22  0710 04/19/22  0723 04/18/22  0721   SODIUM mmol/L 138 137 138   POTASSIUM mmol/L 4.4 4.4 4.7   CHLORIDE mmol/L 106 105 105   CO2 mmol/L 26.7 27.2 26.4   BUN mg/dL 16 14 16   CREATININE mg/dL 1.12  "1.10 1.19   CALCIUM mg/dL 8.4* 8.4* 8.4*   GLUCOSE mg/dL 70 96 124*     Results from last 7 days   Lab Units 04/21/22  0821 04/20/22  0710 04/19/22  0723 04/18/22  0721   MAGNESIUM mg/dL  --  2.3 2.3 2.4   PHOSPHORUS mg/dL  --  2.9 3.0 3.0   HEMOGLOBIN g/dL 9.2* 9.7* 9.3* 9.2*   HEMATOCRIT % 30.4* 30.5* 29.2* 29.4*     COVID19   Date Value Ref Range Status   04/20/2022 Not Detected Not Detected - Ref. Range Final     Lab Results   Component Value Date    HGBA1C 6.7 03/21/2022        Medications   Scheduled Medications atorvastatin, 40 mg, Oral, Daily  digoxin, 62.5 mcg, Oral, Daily  enoxaparin, 1 mg/kg, Subcutaneous, BID  famotidine, 20 mg, Oral, BID AC  [START ON 4/22/2022] ferrous sulfate, 325 mg, Oral, Every Other Day  insulin glargine, 12 Units, Subcutaneous, Nightly  insulin lispro, 0-9 Units, Subcutaneous, TID AC  lactobacillus acidophilus, 1 capsule, Oral, Daily  magnesium oxide, 400 mg, Oral, BID  metoprolol succinate XL, 12.5 mg, Oral, Daily  nystatin, , Topical, Q12H  warfarin, 7.5 mg, Oral, Daily       Infusions Pharmacy to dose warfarin,        PRN Medications •  acetaminophen **OR** [DISCONTINUED] acetaminophen **OR** [DISCONTINUED] acetaminophen  •  calcium carbonate  •  dextrose  •  dextrose  •  diphenhydrAMINE  •  glucagon (human recombinant)  •  hydrocortisone-bacitracin-zinc oxide-nystatin  •  nitroglycerin  •  ondansetron **OR** ondansetron  •  Pharmacy to dose warfarin     Physical Findings        Physical Appearance, NFPE Room air. Awake, alert, laying abed. Just finished lunch.     Generalized wasting; temporal  & orbital hollowing, prominent clavicle & acromion bones; very thin tricep/bicep area   --  Edema  2+ edema LLE   Gastrointestinal Fecal incontinence, BM 4/21   Tubes/Drains    Oral/Mouth Cavity Missing teeth   Skin Sacral spine stage 2 (open slit), Gomez 13   --    Estimated/Assessed Needs       Energy Requirements    Height for Calculation  Height: 182.9 cm (72\")   Weight for " Calculation 60.2 kg   Method for Estimation  30-35 benigno/kg   EST Needs (kcal/day) 3166-8566 benigno       Protein Requirements    Weight for Calculation 60.2 kg   EST Protein Needs (g/kg) 1.2-1.5   EST Daily Needs (g/day) 72-90 gm       Fluid Requirements     Estimated Needs (mL/day) 1800     Current Nutrition Orders & Evaluation of Intake       Oral Nutrition     Food Allergies NKFA   Current PO Diet Diet Regular; Cardiac, Consistent Carbohydrate   Supplement    PO Evaluation     Trending % PO Intake % x 3 days   --  Nutritional Risk Screening       MST SCORE   5     Nutrition Diagnosis        Nutrition Dx Problem 1 Unintentional weight loss over the past year due to reduced appetite/intake associated with multiple hospitalizations for GI issues and psoas hematoma     Nutrition Dx Problem 2        Intervention Goal        Intervention Goal(s) Improved intake trend  Meet estimated needs  Appropriate weight gain     Nutrition Intervention        RD Action Encourage intake  Follow Tx progress  Advise alternative food choices  Menus provided  Interview for preferences  Offered/declined ONS     Nutrition Prescription        Diet Prescription    Supplement Prescription    --  Monitor/Evaluation        Monitor Per protocol, I&O, PO intake, Pertinent labs, Weight, Skin status, GI status, Symptoms, POC/GOC     RD to follow per protocol.      Electronically signed by:  Shanon Chase RD  04/21/22 11:12 EDT

## 2022-04-21 NOTE — PROGRESS NOTES
"Section B. Hearing, Speech, Vision: Expression of Ideas and Wants: Exhibits some  difficulty with expressing needs and ideas (e.g., some words or finishing  thoughts) or speech is not clear.  Understanding Verbal and Non-Verbal Content: Understands: Clear comprehension  without cues or repetitions.    Section C. Cognitive Patterns: Brief Interview for Mental Status (BIMS) was  conducted.  Repetition of Three Words: Three words  Able to report correct year: Missed by 1 year  Able to report correct month: Accurate within 5 days  Able to report correct day of the week: Correct  Able to recall \"sock\": Yes, after cuing  Able to recall \"blue\": Yes, no cue required  Able to recall \"bed\": Yes, no cue required    BIMS SUMMARY SCORE: 13 Cognitively intact Patient was able to complete the Brief  Interview for Mental Status    Signed by: Melinda Larsen RN    "

## 2022-04-21 NOTE — THERAPY EVALUATION
Inpatient Rehabilitation - Physical Therapy Initial Evaluation       [facility]     Patient Name: Francisco Sequeira  : 1935  MRN: [id number]    Today's Date: 2022                    Admit Date: 2022      Visit Dx:     ICD-10-CM ICD-9-CM   1. 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)   • Iron deficiency anemia   • Chronic anticoagulation   • Hemiparesis of left nondominant side as late effect of cerebral infarction (HCC)   • Rectus sheath hematoma   • Sepsis (HCC)   • Calculus of gallbladder with acute cholecystitis without obstruction   • History of Clostridium difficile infection   • Aspiration pneumonitis (HCC)   • Hematoma of iliopsoas muscle, left, initial encounter   • History of CVA (cerebrovascular accident)   • S/P laparoscopic cholecystectomy   • Anemia   • Hematoma of left iliopsoas muscle       Past Medical History:   Diagnosis Date   • Allergic rhinitis    • Aortic valve insufficiency    • Ascending aortic aneurysm (HCC)    • Atrial fibrillation (HCC)    • Bacteremia    • Calcific aortic stenosis of bicuspid valve    • Cardiac arrest (HCC)    • Cardiomyopathy (HCC)    • CKD (chronic kidney disease) stage 3, GFR 30-59 ml/min (HCC)    • Contact dermatitis due to poison ivy    • Elbow fracture    • Esophageal reflux    • GERD (gastroesophageal reflux disease)    • Head injury    • History of transfusion    • Hyperglycemia    • Hyperlipidemia    • Hypertension    • Kidney carcinoma (HCC)    •  Nonischemic cardiomyopathy (HCC)    • Renal oncocytoma    • Seasonal allergic reaction    • Sinusitis    • Syncope    • Type 2 diabetes mellitus (HCC)     uncontrolled   • Visual field defect        Past Surgical History:   Procedure Laterality Date   • AORTIC VALVE REPAIR/REPLACEMENT     • ASCENDING AORTIC ANEURYSM REPAIR W/ MECHANICAL AORTIC VALVE REPLACEMENT     • CHOLECYSTECTOMY WITH INTRAOPERATIVE CHOLANGIOGRAM N/A 3/29/2022    Procedure: Laparoscopic cholecystectomy with cholangiogram, possible open;  Surgeon: Lisa Guidry MD;  Location: [facility] MAIN OR;  Service: General;  Laterality: N/A;   • COLONOSCOPY N/A 10/28/2021    Procedure: COLONOSCOPY to cecum:  cold snare polyps,;  Surgeon: Dinesh Meza MD;  Location: [facility] ENDOSCOPY;  Service: Gastroenterology;  Laterality: N/A;  pre:  Iron deficiency anemia  post:  polyps, diverticulosis,    • COLONOSCOPY N/A 11/9/2021    Procedure: COLONOSCOPY to cecum with APC cautery of AVM and clip placement x1;  Surgeon: Pavan Rodriguez MD;  [facility];  Service: Gastroenterology;  Laterality: N/A;  PRE - gi bleed, anemia  POST - diverticulosis, poor prep, AVM right colon   • ENDOSCOPY N/A 10/28/2021    Procedure: ESOPHAGOGASTRODUODENOSCOPY with biopsies;  Surgeon: Dinesh Meza MD;  Location: [facility] ENDOSCOPY;  Service: Gastroenterology;  Laterality: N/A;  pre:  Iron deficiency anemia  post:  duodenitis and gastritis   • EYE SURGERY  12/09/2020    cataract surgery    • NEPHRECTOMY     • OTHER SURGICAL HISTORY      elbow surgery   • OTHER SURGICAL HISTORY      right arm surgery   • PROSTATE SURGERY     • THORACENTESIS Left     diagnostic       PT ASSESSMENT (last 12 hours)     IRF PT Evaluation and Treatment     Row Name 04/21/22 1039          PT Time and Intention    Document Type initial evaluation  -MS     Mode of Treatment physical therapy  -MS     Patient/Family/Caregiver Comments/Observations AM and PM: supine in bed with  HOB elevated, spouse in room, NAD, exit alarm on  -MS     Total Minutes, Physical Therapy 60  -MS     Row Name 04/21/22 1039          General Information    Patient Profile Reviewed yes  -MS     Existing Precautions/Restrictions fall  -MS     Limitations/Impairments safety/cognitive  -MS     Row Name 04/21/22 1039          Previous Level of Function/Home Environm    Bed Mobility, Premorbid Functional Level independent  -MS     Transfers, Premorbid Functional Level independent;uses device or equipment  -MS     Household Ambulation, Premorbid Functional Level independent;uses device or equipment  -MS     Stairs, Premorbid Functional Level partial assistance;uses device or equipment  -MS     Previous Level of Function, Premorbid Prior to hospitalization, ind with use of RW and current with HH. Patient reports he was beginning to ambulate with cane with PT staff prior to admission to hospital.  -MS     Row Name 04/21/22 1039          Living Environment    Current Living Arrangements home  -MS     Home Accessibility stairs to enter home  -MS     People in Home spouse  -MS     Row Name 04/21/22 1039          Home Main Entrance    Number of Stairs, Main Entrance two  -MS     Surface of Stairs, Main Entrance concrete  -MS     Stair Railings, Main Entrance railing on left side (ascending)  -MS     Row Name 04/21/22 1039          Pain Assessment    Pretreatment Pain Rating 0/10 - no pain  -MS     Posttreatment Pain Rating 0/10 - no pain  -MS     Row Name 04/21/22 1039          Cognition/Psychosocial    Affect/Mental Status (Cognition) WFL  -MS     Orientation Status (Cognition) oriented x 4  -MS     Follows Commands (Cognition) follows three-step commands;75-90% accuracy;repetition of directions required;verbal cues/prompting required;physical/tactile prompts required  -MS     Personal Safety Interventions fall prevention program maintained;gait belt;nonskid shoes/slippers when out of bed  -MS     Cognitive Function safety  deficit  -MS     Safety Deficit (Cognition) judgment  -MS     Row Name 04/21/22 1039          Range of Motion (ROM)    Range of Motion left lower extremity ROM;right lower extremity ROM  -MS     Left Lower Extremity (ROM) hip;knee;ankle  -MS     Hip, Left (Range of Motion) Passively WFL, impaired AROM  -MS     Knee, Left (Range of Motion) Passively WFL, impaired AROM  -MS     Ankle, Left (Range of Motion) Able to perform AROM 50% of range  -MS     Right Lower Extremity (ROM) right LE ROM is WFL  -MS     Row Name 04/21/22 1039          Strength (Manual Muscle Testing)    Strength (Manual Muscle Testing) left lower extremity strength;right lower extremity strength  -MS     Left Lower Extremity Strength hip;knee;ankle  -MS     Hip, Left (Strength) 1/5  -MS     Knee, Left (Strength) 1/5  -MS     Ankle, Left (Strength) 3-/5  -MS     Right Lower Extremity Strength hip;knee;ankle  -MS     Hip, Right (Strength) 4/5  -MS     Knee, Right (Strength) 4-/5  -MS     Ankle, Right (Strength) 4-/5  -MS     Row Name 04/21/22 1039          Sensory    Additional Documentation Sensory Assessment (Somatosensory) (Group)  -MS     Row Name 04/21/22 1039          Mobility    Extremity Weight-bearing Status left lower extremity  -MS     Left Lower Extremity (Weight-bearing Status) weight-bearing as tolerated (WBAT)  -MS     Row Name 04/21/22 1039          Bed Mobility    Bed Mobility supine-sit;sit-supine;rolling left;rolling right  -MS     Rolling Left [place] (Bed Mobility) standby assist  -MS     Rolling Right [place] (Bed Mobility) standby assist  -MS     Supine-Sit [place] (Bed Mobility) contact guard;verbal cues;nonverbal cues (demo/gesture)  -MS     Bed Mobility, Safety Issues decreased use of legs for bridging/pushing;impaired trunk control for bed mobility  -MS     Assistive Device (Bed Mobility) bed rails  -MS     Row Name 04/21/22 1039          Transfer Assessment/Treatment    Transfers bed-chair transfer;sit-stand  transfer;stand-sit transfer;car transfer  -MS     Comment, (Transfers) Tactile cues at L knee to prevent buckling. Attempted standing with use of RW and therapist on stool for buckling but able to achieve upright posture.  -MS     Row Name 04/21/22 1039          Transfers    Bed-Chair [place] (Transfers) moderate assist (50% patient effort);verbal cues;nonverbal cues (demo/gesture)  -MS     Assistive Device (Bed-Chair Transfers) wheelchair  -MS     Sit-Stand [place] (Transfers) minimum assist (75% patient effort);verbal cues;nonverbal cues (demo/gesture)  -MS     Stand-Sit [place] (Transfers) minimum assist (75% patient effort);verbal cues;nonverbal cues (demo/gesture)  -MS     Row Name 04/21/22 1039          Bed-Chair Transfer    Comment, (Bed-Chair Transfer) Squat pivot, max VCs for sequencing  -MS     Row Name 04/21/22 1039          Sit-Stand Transfer    Assistive Device (Sit-Stand Transfers) parallel bars  -MS     Row Name 04/21/22 1039          Stand-Sit Transfer    Assistive Device (Stand-Sit Transfers) parallel bars  -MS     Row Name 04/21/22 1039          Car Transfer    Type (Car Transfer) squat pivot  -MS     [place] Level (Car Transfer) unable to assess  -MS     Assistive Device (Car Transfer) wheelchair  -MS     Comment, (Car Transfer) Unable to successfully perform.  -MS     Row Name 04/21/22 1039          Gait/Stairs (Locomotion)    [place] Level (Gait) minimum assist (75% patient effort);moderate assist (50% patient effort);2 person assist;verbal cues;nonverbal cues (demo/gesture)  -MS     Assistive Device (Gait) parallel bars  -MS     Distance in Feet (Gait) 8' x 5 trials  -MS     Pattern (Gait) step-through  -MS     Deviations/Abnormal Patterns (Gait) raul decreased;left sided deviations;stride length decreased  -MS     Left Sided Gait Deviations knee buckling, left side;heel strike decreased  -MS     Comment, (Gait/Stairs) Therapist provided maxA at knee to prevent knee  buckling and assist with knee extension, attempted to provide less assist but unsuccessful. WC behind patient during ambulation to ensure safety.  -MS     Row Name 04/21/22 1039          Safety Issues, Functional Mobility    Safety Issues Affecting Function (Mobility) awareness of need for assistance;friction/shear risk;insight into deficits/self-awareness;judgment;positioning of assistive device;sequencing abilities  -MS     Impairments Affecting Function (Mobility) balance;cognition;endurance/activity tolerance;motor control;postural/trunk control;range of motion (ROM);sensation/sensory awareness;strength  -MS     Cognitive Impairments, Mobility Safety/Performance problem-solving/reasoning;insight into deficits/self-awareness  -MS     Row Name 04/21/22 1039          Balance    Balance Assessment sitting static balance;sitting dynamic balance;standing static balance;standing dynamic balance  -MS     Static Sitting Balance standby assist  -MS     Dynamic Sitting Balance standby assist  -MS     Position, Sitting Balance sitting in chair  -MS     Static Standing Balance contact guard  -MS     Dynamic Standing Balance contact guard;minimal assist  -MS     Position/Device Used, Standing Balance parallel bars  -MS     Row Name 04/21/22 1039          Motor Skills    Therapeutic Exercise knee;hip  -MS     Row Name 04/21/22 1039          Hip (Therapeutic Exercise)    Hip (Therapeutic Exercise) strengthening exercise  -MS     Hip Strengthening (Therapeutic Exercise) left;marching while seated;10 repetitions;3 sets  active assisted with marching  -MS     Row Name 04/21/22 1039          Knee (Therapeutic Exercise)    Knee (Therapeutic Exercise) strengthening exercise;isometric exercises  -MS     Knee Isometrics (Therapeutic Exercise) left;quad sets;10 repetitions;3 sets  -MS     Row Name 04/21/22 1039          Positioning and Restraints    Pre-Treatment Position in bed  -MS     Post Treatment Position wheelchair  -MS     In  Wheelchair sitting;call light within reach;encouraged to call for assist;exit alarm on  AM and PM: awaiting OT  -MS     Row Name 04/21/22 1039          Vital Signs    O2 Delivery Pre Treatment room air  -MS     Row Name 04/21/22 1039          Therapy Assessment/Plan (PT)    Patient's Goals For Discharge return home  -MS     Family Goals For Discharge patient able to provide self-care with only supervision  -MS     Row Name 04/21/22 1039          Therapy Assessment/Plan (PT)    Functional Level at Time of Evaluation (PT) CGA for bed mobility, modA for transfers, modA for ambulation  -MS     PT Diagnosis (PT) Impaired strength of L LE, impaired motor control of L LE, impaired activity tolerance, impaired static standing balance, impaired dynamic standing balance, impaired endurance, impaired gait quality, impaired activity tolerance  -MS     Rehab Potential/Prognosis (PT) good, to achieve stated therapy goals  -MS     Frequency of Treatment (PT) 90 minutes per session  -MS     Estimated Duration of Therapy (PT) 4 weeks  -MS     Problem List (PT) problems related to;balance;mobility;motor control;range of motion (ROM);strength;postural control  -MS     Activity Limitations Related to Problem List (PT) unable to ambulate safely;unable to transfer safely  -MS     Row Name 04/21/22 1039          Therapy Plan Review/Discharge Plan (PT)    Therapy Plan Review (PT) evaluation/treatment results reviewed;care plan/treatment goals reviewed;participants voiced agreement with care plan;participants included;patient;spouse/significant other  -MS     Anticipated Equipment Needs at Discharge (PT Eval) wheelchair  pending patient progress made, LRAD for R LE  -MS     Anticipated Discharge Disposition (PT) home with supervision  -MS     Row Name 04/21/22 1039          IRF PT Goals    Bed Mobility Goal Selection (PT-IRF) bed mobility, PT goal 2  -MS     Transfer Goal Selection (PT-IRF) transfers, PT goal 1;transfers, PT goal 2  -MS      Gait (Walking Locomotion) Goal Selection (PT-IRF) gait, PT goal 1;gait, PT goal 2  -MS     Stairs Goal Selection (PT-IRF) stairs, PT goal 1  -MS     Row Name 04/21/22 1039          Bed Mobility Goal 2 (PT-IRF)    Activity/Assistive Device (Bed Mobility Goal 2, PT-IRF) bed mobility activities, all  -MS     [place] Level (Bed Mobility Goal 2, PT-IRF) supervision required  -MS     Time Frame (Bed Mobility Goal 2, PT-IRF) long-term goal (LTG);4 weeks  -MS     Row Name 04/21/22 1039          Transfer Goal 1 (PT-IRF)    Activity/Assistive Device (Transfer Goal 1, PT-IRF) all transfers;walker, rolling  -MS     [place] Level (Transfer Goal 1, PT-IRF) minimum assist (75% or more patient effort)  -MS     Time Frame (Transfer Goal 1, PT-IRF) short-term goal (STG);2 weeks  -MS     Row Name 04/21/22 1039          Transfer Goal 2 (PT-IRF)    Activity/Assistive Device (Transfer Goal 2, PT-IRF) all transfers;walker, rolling  -MS     [place] Level (Transfer Goal 2, PT-IRF) supervision required  -MS     Time Frame (Transfer Goal 2, PT-IRF) long-term goal (LTG);4 weeks  -MS     Row Name 04/21/22 1039          Gait/Walking Locomotion Goal 1 (PT-IRF)    Activity/Assistive Device (Gait/Walking Locomotion Goal 1, PT-IRF) gait (walking locomotion);walker, rolling  -MS     Gait/Walking Locomotion Distance Goal 1 (PT-IRF) 25  -MS     [place] Level (Gait/Walking Locomotion Goal 1, PT-IRF) minimum assist (75% or more patient effort)  -MS     Time Frame (Gait/Walking Locomotion Goal 1, PT-IRF) short-term goal (STG);2 weeks  -MS     Row Name 04/21/22 1039          Gait/Walking Locomotion Goal 2 (PT-IRF)    Activity/Assistive Device (Gait/Walking Locomotion Goal 2, PT-IRF) gait (walking locomotion);walker, rolling  -MS     Gait/Walking Locomotion Distance Goal 2 (PT-IRF) 75  -MS     [place] Level (Gait/Walking Locomotion Goal 2, PT-IRF) supervision required  -MS     Time Frame (Gait/Walking Locomotion Goal 2, PT-IRF)  long-term goal (LTG);4 weeks  -MS     Row Name 04/21/22 1039          Stairs Goal 1 (PT-IRF)    Activity/Assistive Device (Stairs Goal 1, PT-IRF) stairs, all skills  -MS     Number of Stairs (Stairs Goal 1, PT-IRF) 2  -MS     [place] Level (Stairs Goal 1, PT-IRF) minimum assist (75% or more patient effort)  -MS     Time Frame (Stairs Goal 1, PT-IRF) long-term goal (LTG);4 weeks  -MS           User Key  (r) = Recorded By, (t) = Taken By, (c) = Cosigned By    Initials Name Provider Type    Goldie Lazar LEELEE, PT Physical Therapist              Wound 03/29/22 1858 abdomen Incision (Active)   Dressing Appearance open to air 04/21/22 0800   Closure Liquid skin adhesive 04/21/22 0800   Base other (see comments) 04/20/22 1646   Drainage Amount none 04/21/22 0000   Dressing Care open to air 04/21/22 0800       Wound 04/15/22 0352 Left posterior cervical spine Abrasion (Active)   Dressing Appearance dry;intact;no drainage 04/20/22 1646   Periwound intact;pink;other (see comments) 04/20/22 1646   Care, Wound cleansed with;soap and water 04/21/22 0800   Dressing Care foam;dressing applied 04/21/22 0800       Wound 04/18/22 2048 Bilateral medial gluteal MASD (Moisture associated skin damage) (Active)   Dressing Appearance open to air 04/21/22 0800   Base blanchable;red 04/21/22 0800   Periwound moist 04/21/22 0800   Care, Wound cleansed with;soap and water 04/21/22 0800   Dressing Care foam 04/21/22 0800       Wound 04/18/22 2048 Bilateral groin MASD (Moisture associated skin damage) (Active)   Dressing Appearance open to air 04/21/22 0800   Base red 04/21/22 0800   Care, Wound cleansed with;soap and water 04/21/22 0800   Dressing Care open to air 04/21/22 0800   Periwound Care topical treatment applied 04/21/22 0000       Wound 04/18/22 2048  medial coccyx Skin Tear (Active)   Dressing Appearance dry;intact 04/21/22 0800   Base blanchable;red 04/21/22 0800   Care, Wound cleansed with;soap and water 04/21/22 0800    Dressing Care dressing applied;foam 04/21/22 0800       Wound 04/20/22 1659 Bilateral medial sacral spine Pressure Injury (Active)   Dressing Appearance dry;intact 04/21/22 1046   Base blanchable;red 04/21/22 1046   Periwound intact 04/21/22 1046   Edges irregular 04/21/22 1046   Drainage Amount none 04/21/22 1046   Care, Wound cleansed with;soap and water 04/21/22 0800   Dressing Care dressing changed;foam 04/21/22 0800   Periwound Care barrier ointment applied 04/21/22 0800     Physical Therapy Education                 Title: PT OT SLP Therapies (In Progress)     Topic: Physical Therapy (Done)     Point: Mobility training (Done)     Learning Progress Summary           Patient Acceptance, E,TB, VU by MS at 4/21/2022 1102   Significant Other Acceptance, E,TB, VU by MS at 4/21/2022 1102                   Point: Home exercise program (Done)     Learning Progress Summary           Patient Acceptance, E,TB, VU by MS at 4/21/2022 1102   Significant Other Acceptance, E,TB, VU by MS at 4/21/2022 1102                   Point: Body mechanics (Done)     Learning Progress Summary           Patient Acceptance, E,TB, VU by MS at 4/21/2022 1102   Significant Other Acceptance, E,TB, VU by MS at 4/21/2022 1102                   Point: Precautions (Done)     Learning Progress Summary           Patient Acceptance, E,TB, VU by MS at 4/21/2022 1102   Significant Other Acceptance, E,TB, VU by MS at 4/21/2022 1102                               User Key     Initials Effective Dates Name Provider Type Discipline    MS 06/16/21 -  Goldie Abrams, PT Physical Therapist PT                PT Recommendation and Plan    Planned Therapy Interventions (PT): bed mobility training, balance training, gait training, home exercise program, postural re-education, patient/family education, ROM (range of motion), strengthening, transfer training  Frequency of Treatment (PT): 90 minutes per session  Anticipated Equipment Needs at Discharge (PT  Eval): wheelchair (pending patient progress made, LRAD for R LE)     Patient is a pleasant 86 y.o. male admitted to [facility] for immobility syndrome and L iliopsoas muscle hematoma on 4/20/2022. Patient initially admitted to [facility] for complaints of L LE weakness and swelling. 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 Other PMHx includes recent hospitalizations for acute cholecystitis s/p lap teja 3/29/22 and aspiration PNA. Patient was discharged home and was current with HH PT prior to hospitalization. Patient is ambulatory with a RW at baseline and lives at home with spouse. Patient reports he began practicing ambulation with a cane during HH PT therapy sessions. Today, patient performed bed mobility with CGA, required modA for transfers, and ambulated up to 8' minAx2 with a wheelchair following. Patient requires tactile cues and maxA at knee to prevent L knee buckling during ambulation. Strength, activity tolerance, balance and endurance deficits noted. Patient is currently at increased risk for falls and will benefit from continued PT to address above impairments, improve safety, and increased independence with functional mobility.     Time Calculation:      PT Charges     Row Name 04/21/22 1406 04/21/22 1037          Time Calculation    Start Time 1330  -MS 0830  -MS     Stop Time 1400  -MS 0930  -MS     Time Calculation (min) 30 min  -MS 60 min  -MS     PT Received On -- 04/21/22  -MS     PT - Next Appointment -- 04/22/22  -MS     PT Goal Re-Cert Due Date -- 04/28/22  -MS           User Key  (r) = Recorded By, (t) = Taken By, (c) = Cosigned By    Initials Name Provider Type    Goldie Lazar PT Physical Therapist                Therapy Charges for Today     Code Description Service Date Service Provider Modifiers Qty    20731220438 HC PT EVAL MOD COMPLEXITY 2  4/21/2022 Abrams, Goldie MCKOY, PT GP 1    [id number]  PT THER PROC EA 15 MIN 4/21/2022 Abrams, Goldie MCKOY, PT GP 2    23941644176 HC PT THERAPEUTIC ACT EA 15 MIN 4/21/2022 Abrams, Goldie MCKOY, PT GP 1    [id number]  PT THER SUPP EA 15 MIN 4/21/2022 Abrams, Goldie MCKOY, PT GP 3                   Goldie MCKOY Summers, PT  4/21/2022

## 2022-04-21 NOTE — PROGRESS NOTES
SECTION GG    Eating Performance: Worden sets up or cleans up; patient completes activity.  [place] assists only prior to or following the activity.    Eating Discharge Goals: Patient completed the activities by him/herself with no  assistance from a helper.    Signed by: Jenn Carmichael RN

## 2022-04-21 NOTE — PROGRESS NOTES
SECTION GG    Mobility Performance:     Roll Left and Right: [place] provides verbal cues and/or touching/steadying  and/or contact guard assistance as patient completes activity. Assistance may be  provided throughout the activity or intermittently.   Sit to Lying: [place] provides verbal cues and/or touching/steadying and/or  contact guard assistance as patient completes activity. Assistance may be  provided throughout the activity or intermittently.   Lying to Sitting on Side of Bed: [place] provides verbal cues and/or  touching/steadying and/or contact guard assistance as patient completes  activity. Assistance may be provided throughout the activity or intermittently.   Sit to Stand: [place] does more than half the effort. [place] lifts or holds  trunk or limbs and provides more than half the effort.   Chair/Bed to Chair Transfer: [place] does more than half the effort. [place]  lifts or holds trunk or limbs and provides more than half the effort.   Car Transfer: Not attempted due to medical or safety concerns.   Walk 10 Feet:   Not attempted due to medical or safety concerns.  1 Step Over Curb or Up/Down Stair:   Not attempted due to medical or safety  concerns.  Picking up an Object:   Not attempted due to medical or safety concerns.  Uses Wheelchair/Scooter: No    Mobility Discharge Goals:   Roll Left and Right: Patient completed the activities by him/herself with no  assistance from a helper.   Sit to Lying: Patient completed the activities by him/herself with no  assistance from a helper.   Lying to Sitting on Side of Bed: Patient completed the activities by  him/herself with no assistance from a helper.   Sit to Stand: Patient completed the activities by him/herself with no  assistance from a helper.   Chair/Bed to Chair Transfer: Patient completed the activities by him/herself  with no assistance from a helper.   Car Transfer: [place] provides verbal cues or touching/steadying assistance as  patient completes  activity.   Walk Discharge Goals:   Walk 10 Feet: Patient completed the activities by him/herself with no  assistance from a helper.   Walk 50 Feet With 2 Turns: Patient completed the activities by him/herself with  no assistance from a helper.   Walk 150 Feet: [place] provides verbal cues or touching/steadying assistance as  patient completes activity.   Walking 10 Feet on Uneven Surfaces: [place] provides verbal cues or  touching/steadying assistance as patient completes activity.   1 Step Over Curb or Up/Down Stair: [place] provides verbal cues or  touching/steadying assistance as patient completes activity.   4 Steps Up and Down, With/Without Rail: [place] provides verbal cues or  touching/steadying assistance as patient completes activity.   Picking up an Object: Patient completed the activities by him/herself with no  assistance from a helper.    Signed by: Goldie Abrams, PT

## 2022-04-21 NOTE — PROGRESS NOTES
Inpatient Rehabilitation Plan of Care Note    Plan of Care  Care Plan Reviewed - Updates as Follows    Body Systems    [RN] Integumentary(Active)  Current Status(04/21/2022): Pt has blanchable redness to coccyx with dry,  peeling skin to coccyx and buttocks. No open area noted. Excoriation to groin.  Scattered bruising. 3 glued lap sites to abd. Pt able to turn in bed.  Weekly Goal(04/28/2022): Skin will remain free from s/s infection.  Discharge Goal: Skin will remain free from further breakdown.        Psychosocial    [RN] Coping/Adjustment(Active)  Current Status(04/21/2022): Patient is at risk for reduced coping r/t recent  hospitalization and new mobility issues. Pt is A/Ox4 but can be forgetful. Pt  has a supportive spouse.  Weekly Goal(04/28/2022): Pt will verbalize needs, concerns, feelings to staff as  needed.  Discharge Goal: Pt will be able to verbalize adequate coping strategies he can  use at home.        Safety    [RN] Potential for Injury(Active)  Current Status(04/21/2022): Pt is at increased risk for falls/injury r/t recent  hospitalization and reduced mobility.  Weekly Goal(04/28/2022): Pt will call appropriately for assistance as needed.  Discharge Goal: Pt will remain free from falls/injury.        Sphincter Control    [RN] Bladder Management(Active)  Current Status(04/21/2022): Pt can be continent and incontinent of urine.  Weekly Goal(04/28/2022): Pt will be 50% continent.  Discharge Goal: Pt will be 75% continent of urine.    [RN] Bowel Management(Active)  Current Status(04/21/2022): Pt is reportedly incontinent of stool.  Weekly Goal(04/28/2022): Pt will be 50% continent.  Discharge Goal: Pt will be continent 75% of the time.    Signed by: Jenn Carmichael RN

## 2022-04-21 NOTE — H&P
[facility]   HISTORY AND PHYSICAL    Patient Name: Francisco Sequeira  : 1935  MRN: [id number]  Primary Care Physician:  Rubin Hinkle APRN  Date of admission: 2022    Subjective   Subjective     Chief Complaint:   Left lumbosacral plexopathy-upper greater than lower/left femoral nerve palsy--secondary to left iliopsoas hematoma  Left lower extremity weakness secondary to lumbosacral plexopathy and iliopsoas hematoma  History of mechanical heart valve-INR goal 3.0-3.5 secondary to previous stroke-pharmacy managing  Atrial fibrillation-digoxin/metoprolol  Nonischemic cardiomyopathy.  Hypertension.  Impaired mobility/impaired self-care  Chronic kidney disease stage III  Hyperglycemia-Lantus  Anemia-ferrous sulfate  Recent C. difficile colitis-completed vancomycin taper      History of Present Illness  Patient is 86-year-old male who was previously dependent who admitted to the [facility] rehabilitation unit with a history of left iliopsoas hematoma with left lumbosacral plexopathy and left lower extremity weakness.  He had undergone a laparoscopic cholecystectomy on 2022 for acute on chronic cholecystitis.  He was on aspirin with Lovenox to Coumadin transition.  He has history of mechanical heart valve, previous stroke with INR goal 3.0-3.5, and atrial fibrillation.  He was still on the transition from Lovenox to Coumadin with INR of 1.6 when he presented to [facility] on 2022 with left lower extremity weakness.  CT scan showed left iliac us and left iliopsoas hematoma.  He has weakness proximally greater than distally in the left lower extremity consistent with a left lumbosacral plexopathy.  There was attempt to aspirate the hematoma but was unable to obtain any material.    Transferred to the rehabilitation unit for ongoing therapies.  On Lovenox to Coumadin transition.  INR 2.07 on  and INR 2.92 on  and Lovenox has been discontinued  with an INR goal of 3.0-3.5.  He is to remain off of aspirin.  He denies any premorbid functional limitations.  Bed mobility contact-guard assist-min assist of 2.  Minimal assist for blocking left knee.  Ambulated minimum assist of 2 with third person following with a chair for safety rolling walker 20 feet.  Therapist providing max assist to block left knee into extension during stance.  Second person providing min assist to gait belt for balance and safety in third person following closely with chair.  Functional mobility requires contact-guard min assist with blocking of the left knee.  Occupational therapy working on upper trunk control.    Given his functional impairments and comorbidities, now admitted for acute inpatient rehabilitation    Review of Systems   10 point review of systems reviewed and as per his HPI.  Personal History     Past Medical History:   Diagnosis Date   • Allergic rhinitis    • Aortic valve insufficiency    • Ascending aortic aneurysm (HCC)    • Atrial fibrillation (HCC)    • Bacteremia    • Calcific aortic stenosis of bicuspid valve    • Cardiac arrest (HCC)    • Cardiomyopathy (HCC)    • CKD (chronic kidney disease) stage 3, GFR 30-59 ml/min (HCC)    • Contact dermatitis due to poison ivy    • Elbow fracture    • Esophageal reflux    • GERD (gastroesophageal reflux disease)    • Head injury    • History of transfusion    • Hyperglycemia    • Hyperlipidemia    • Hypertension    • Kidney carcinoma (HCC)    • Nonischemic cardiomyopathy (HCC)    • Renal oncocytoma    • Seasonal allergic reaction    • Sinusitis    • Syncope    • Type 2 diabetes mellitus (HCC)     uncontrolled   • Visual field defect        Past Surgical History:   Procedure Laterality Date   • AORTIC VALVE REPAIR/REPLACEMENT     • ASCENDING AORTIC ANEURYSM REPAIR W/ MECHANICAL AORTIC VALVE REPLACEMENT     • CHOLECYSTECTOMY WITH INTRAOPERATIVE CHOLANGIOGRAM N/A 3/29/2022    Procedure: Laparoscopic cholecystectomy with  cholangiogram, possible open;  Surgeon: Lisa Guidry MD;  Location: [facility] MAIN OR;  Service: General;  Laterality: N/A;   • COLONOSCOPY N/A 10/28/2021    Procedure: COLONOSCOPY to cecum:  cold snare polyps,;  Surgeon: Dinesh Meza MD;  Location: [facility] ENDOSCOPY;  Service: Gastroenterology;  Laterality: N/A;  pre:  Iron deficiency anemia  post:  polyps, diverticulosis,    • COLONOSCOPY N/A 11/9/2021    Procedure: COLONOSCOPY to cecum with APC cautery of AVM and clip placement x1;  Surgeon: Pavan Rodriguez MD;  Location: [facility] ENDOSCOPY;  Service: Gastroenterology;  Laterality: N/A;  PRE - gi bleed, anemia  POST - diverticulosis, poor prep, AVM right colon   • ENDOSCOPY N/A 10/28/2021    Procedure: ESOPHAGOGASTRODUODENOSCOPY with biopsies;  Surgeon: Dinesh Meza MD;  Location: [facility] ENDOSCOPY;  Service: Gastroenterology;  Laterality: N/A;  pre:  Iron deficiency anemia  post:  duodenitis and gastritis   • EYE SURGERY  12/09/2020    cataract surgery    • NEPHRECTOMY     • OTHER SURGICAL HISTORY      elbow surgery   • OTHER SURGICAL HISTORY      right arm surgery   • PROSTATE SURGERY     • THORACENTESIS Left     diagnostic       Family History: family history includes Cancer in his brother and mother. Otherwise pertinent FHx was reviewed and not pertinent to current issue.    Social History:  reports that he has quit smoking. He has quit using smokeless tobacco. He reports current alcohol use. He reports that he does not use drugs.  Lives with his wife.  2 small steps to enter.  First-floor bedroom bathroom.  Wife able to assist  Home Medications:  Digoxin, V-Go 40, atorvastatin, diphenhydrAMINE, famotidine, ferrous gluconate, fish oil, insulin lispro, lactobacillus acidophilus, magnesium oxide, metoprolol succinate XL, saccharomyces boulardii, and warfarin    Allergies:  Allergies   Allergen Reactions   • Penicillins Swelling   • Other Other (See Comments)     Grass, ragweed   • Percocet  [Oxycodone-Acetaminophen] Nausea And Vomiting     MEDICATIONS:  Scheduled Meds:atorvastatin, 40 mg, Oral, Daily  digoxin, 62.5 mcg, Oral, Daily  famotidine, 20 mg, Oral, BID AC  [START ON 4/22/2022] ferrous sulfate, 325 mg, Oral, Every Other Day  insulin glargine, 12 Units, Subcutaneous, Nightly  insulin lispro, 0-9 Units, Subcutaneous, TID AC  lactobacillus acidophilus, 1 capsule, Oral, Daily  magnesium oxide, 400 mg, Oral, BID  metoprolol succinate XL, 12.5 mg, Oral, Daily  nystatin, , Topical, Q12H      Continuous Infusions:Pharmacy to dose warfarin,       PRN Meds:.•  acetaminophen **OR** [DISCONTINUED] acetaminophen **OR** [DISCONTINUED] acetaminophen  •  calcium carbonate  •  dextrose  •  dextrose  •  diphenhydrAMINE  •  glucagon (human recombinant)  •  hydrocortisone-bacitracin-zinc oxide-nystatin  •  nitroglycerin  •  ondansetron **OR** ondansetron  •  Pharmacy to dose warfarin      Objective    Objective     Vitals:   Temp:  [97.1 °F (36.2 °C)-98 °F (36.7 °C)] 97.1 °F (36.2 °C)  Heart Rate:  [75-88] 80  Resp:  [16-20] 16  BP: (117-129)/(55-70) 122/70    Physical Exam    MENTAL STATUS -  AWAKE / ALERT  HEENT- NCAT,   SCLERAE ANICTERIC, CONJUNCTIVAE PINK, OP MOIST, NO JVD, EARS UNREMARKABLE EXTERNALLY  LUNGS - CTA, NO WHEEZES, RALES OR RHONCHI  HEART-irregular NO RUB, MURMUR, OR GALLOP  Prosthetic click  ABD - NORMOACTIVE BOWEL SOUNDS, SOFT, NT. NO HEPATOSPLENOMEGALY APPRECIATED  EXT - NO EDEMA OR CYANOSIS  NEURO -reports light touch present in the dermatomes of the bilateral lower extremities.  Proprioception accurate at the great toes bilaterally.  MOTOR EXAM - RUE/RLE 5/5. LUE 5/5.    Left hip flexion 2/5, adduction takes resistance, hip extension-active, knee extension 0/5, knee flexion 0/5, ankle dorsiflexion 4/5, EHL 4/5, ankle plantarflexion takes resistance    Result Review    Result Review:  I have personally reviewed the results from the time of this admission to 4/21/2022 10:18 EDT and agree with  these findings:    Results from last 7 days   Lab Units 04/21/22  0821 04/20/22  0710 04/19/22  0723   INR  2.92* 2.07* 1.82*     Results from last 7 days   Lab Units 04/21/22  0821 04/20/22  0710 04/19/22  0723   WBC 10*3/mm3 3.38* 3.28* 3.10*   HEMOGLOBIN g/dL 9.2* 9.7* 9.3*   HEMATOCRIT % 30.4* 30.5* 29.2*   PLATELETS 10*3/mm3 213 219 217     Results from last 7 days   Lab Units 04/20/22  0710 04/19/22  0723 04/18/22  0721   SODIUM mmol/L 138 137 138   POTASSIUM mmol/L 4.4 4.4 4.7   CHLORIDE mmol/L 106 105 105   CO2 mmol/L 26.7 27.2 26.4   BUN mg/dL 16 14 16   CREATININE mg/dL 1.12 1.10 1.19   CALCIUM mg/dL 8.4* 8.4* 8.4*   GLUCOSE mg/dL 70 96 124*         Assessment/Plan   Assessment / Plan       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  The hip weakness is probably commendation of hematoma and iliopsoas as well as nerve impingement.  He is weaker with knee extension compared to hip adduction which would point to also component of femoral nerve palsy.    Reviewed features of upper lumbosacral plexopathy greater than lower lumbosacral plexopathy with the patient and his wife, reviewed timeframe of recovery is typically measured in months and may be incomplete, and reviewed need for bracing which will be determined and see how he progresses with his rehabilitation program.  Reviewed different styles of knee orthosis including dynamic knee extension versus lockable knee unit depending on level of stability he needs.      History of mechanical heart valve-  Mechanical aortic valve replacement  Prior CVA with residual left-sided weakness (INR at 2.2)  Chronic anticoagulation with goal INR of 3 - 3.5  INR goal 3.0-3.5 secondary to previous stroke-pharmacy managing  April 21-INR 2.92.  Discontinue Lovenox.  Continue  Coumadin    Atrial fibrillation-digoxin/metoprolol  Nonischemic cardiomyopathy.  Hypertension.    Impaired mobility/impaired self-care    History of lower GI bleed with hematochezia in November 2021  Right rectus sheath hematoma in November 2021    Chronic kidney disease stage III  History of renal cell carcinoma and right nephrectomy     Hyperglycemia-Lantus  April 21-blood glucose low this morning and decrease Lantus    Anemia-ferrous sulfate    Recent C. difficile colitis-completed vancomycin taper    Now admit for comprehensive acute inpatient rehabilitation .  This would be an interdisciplinary program with physical therapy 1.5 hour,  occupational therapy 1.5 hour,  5 days a week.  Rehabilitation nursing for carryover, monitoring of cardiac and neurologic   status, bowel and bladder, and skin  Ongoing physician follow-up.  Weekly team conferences.  Goals are to achieve a level of contact-guard assist with  mobility and self-care and improved strength.   Rehabilitation prognosis indeterminate.  Medical prognosis indeterminate.  Estimated length of stay is approximately 2 weeks, but is only an estimation.     The patient's functional status and clinical status is unchanged from preadmission assessment and the patient continues appropriate for acute inpatient rehabilitation.  Goal is for home with outpatient   therapies.  Barrier to discharge: Impaired mobility and self-care- work on strength, transfers, balance, progressive ambulation, bracing, activities of daily living to overcome.       CODE STATUS:    Code Status (Patient has no pulse and is not breathing): CPR (Attempt to Resuscitate)  Medical Interventions (Patient has pulse or is breathing): Full Support    Admission Status:  I believe this patient meets inpatient status.    Grant Andrade MD    During rounds, used appropriate personal protective equipment including mask and gloves.  Additional gown if indicated.  Mask used was standard procedure  mask. Appropriate PPE was worn during the entire visit.  Hand hygiene was completed before and after.

## 2022-04-21 NOTE — PROGRESS NOTES
Inpatient Rehabilitation Plan of Care Note    Plan of Care  Care Plan Reviewed - No updates at this time.    Psychosocial    Performed Intervention(s)  Provide distraction and/or relaxation as needed.  Allow extra time for patient to verbalize needs, feelings, concerns, etc.      Safety    Performed Intervention(s)  Hourly rounding.  Fall precautions: bed low and locked, patient belongings adn call light w/in  reach, nonskid socks and gait belt in use, bed and chair alarms in use.      Sphincter Control    Performed Intervention(s)  Monitor I/O.  Toileting schedule while awake.  Assist with urinal as needed, use incontinence products as needed.      Body Systems    Performed Intervention(s)  barrier cream and powder, as ordered.  Skin assessment q shift and prn  Low air loss bed (waffle overlay not available per cpd).  Turn q2h or encourage turning.    Signed by: Melinda Larsen RN

## 2022-04-21 NOTE — PLAN OF CARE
Goal Outcome Evaluation:  Plan of Care Reviewed With: patient      Pt is alert and oriented. Pleasant. Soft spoken. Left lower extremity weakness. Up with 2 assist. Pt has shear and moisture on the coccyx area. Barrier cream and silicone boarder drscallum applied. Wound RN in to see patient. Hx of c-diff. Had two BMs.   Pt takes medications whole with applesauce. Wife states that pt has been doing this at home for a while. Wife at the bedside. Uses call light appropriately.

## 2022-04-21 NOTE — PROGRESS NOTES
SECTION GG    Self Care Performance:   Lower Body Dressing: [place] does all of the effort. Patient does none of the  effort to complete the activity. Or, the assistance of 2 or more helpers is  required for the patient to complete the activity.   Putting On/Taking Off Footwear: [place] does all of the effort. Patient does  none of the effort to complete the activity. Or, the assistance of 2 or more  helpers is required for the patient to complete the activity.    Self Care Discharge Goals:   Lower Body Dressing: [place] does more than half the effort. [place] lifts or  holds trunk or limbs and provides more than half the effort.   Putting On/Taking Off Footwear: [place] sets up or cleans up; patient completes  activity. [place] assists only prior to or following the activity.    Mobility Toilet Transfer Performance: [place] does less than half the effort.  [place] lifts, holds or supports trunk or limbs but provides less than half the  effort.    Mobility Toilet Transfer Discharge Goal: [place] provides verbal cues or  touching/steadying assistance as patient completes activity.    Signed by: POLA Johnson/BRENNAN

## 2022-04-21 NOTE — PROGRESS NOTES
Recreational Therapy Note    Patient Name: Francisco Sequeira   MRN: [id number]    Therapeutic Recreation Eval and Treat (last 12 hours)     Therapeutic Recreation Eval & Treat     Row Name 04/21/22 1509       Pertinent Diagnosis/Presenting Problems    Presenting Problems/Reason for Referral Hematoma of left iliopsoas muscle  -TW    Row Name 04/21/22 1509       Lifestyle/Recreational History/Interest    Profession/Job retired  -TW    Current Living Situation with family  -TW    Row Name 04/21/22 1509       Recreational History and Interests    How Important is Recreation to You? high importance  -TW    Satisfied With Leisure Lifestyle? yes  -TW    Participate Regularly in Leisure Activity? yes  -TW    Are You a Social Person? yes  -TW    Current Hobbies/Interests sports/team sports;spectator events;home improvement  -TW    Spectator Events sporting events  auto racing  -TW    Row Name 04/21/22 1509       Barriers To Leisure Activity    Barriers to Leisure Activity mobility limitations  -TW    Mobility Limitations (Barriers to Leisure) gross motor coordination issues  -TW    Row Name 04/21/22 1509       Coping/Wellbeing    Current State of Wellbeing calm;positive attitude  -TW    Factors Impacting Current State of Wellbeing no significant issues  -TW    Row Name 04/21/22 1509       Therapeutic Recreation Participation    Recreation Therapy Participation games  -TW    Games card games  ROBBI  -TW    Objectives of Recreation Participation increase;sense of autonomy by choosing level of participation  -TW    Comment, Recreation Participation occ cues for directions  -TW    Orientation to Therapeutic Recreation patient;received explanation of recreation therapy programs;completed therapeutic recreation assessment  -TW    Recreation Therapy Summary of Participation active participation  -TW    Row Name 04/21/22 1509       Therapeutic Recreation Assessment/Plan    Recreation Therapy Goals/Objectives improve;leisure  skills/knowledge;leisure participation;strength and endurance  -TW    Recreation Plan leisure exploration;structured leisure participation  -TW          User Key  (r) = Recorded By, (t) = Taken By, (c) = Cosigned By    Initials Name Provider Type    Whit Mallory, RAMIREZ Recreational Therapist                  RAMIREZ Thorne  4/21/2022

## 2022-04-21 NOTE — NURSING NOTE
WOCN     04/21/22 1046   Wound 04/20/22 1659 Bilateral medial sacral spine Pressure Injury   Placement Date/Time: 04/20/22 1659   Present on Hospital Admission: Yes  Side: Bilateral  Orientation: medial  Location: sacral spine  Primary Wound Type: (c) Pressure Injury  Additional Comments: Stage 2 open slit   Dressing Appearance dry;intact   Base blanchable;red   Periwound intact   Edges irregular   Drainage Amount none   Coccyx buttock combination shear and moisture. Partial thickness. Edges irregular.  Previous cdiff. Continue to use magic cream and silicone border dressing. Please call if any further needs.

## 2022-04-21 NOTE — PROGRESS NOTES
Discharge Planning Assessment  [facility]     Patient Name: Francisco Sequeira  MRN: [id number]  Today's Date: 4/21/2022    Admit Date: 4/20/2022     Discharge Needs Assessment    No documentation.                Discharge Plan     Row Name 04/21/22 1545       Plan    Plan Patient will d/c home with wife. Prefer to use [facility] for home PT if needed.    Patient/Family in Agreement with Plan yes    Plan Comments Completed assessment with patient and wife. Patient lives with wife in one story home with 2 step entry with rail. Recently d/c from hospital and was receiving home PT and NSG from [facility]. Patient was using rolling walker and wife was assisting as needed. Patient does have rollator, BSC, and shower chair at home but was not using. Patient will d/c home with wife and anticipate HH services through [facility]. Wife still works but can work from home until patient okay to stay home alone. Discussed SW role, team and family conference. Will follow and assist with plans.              Continued Care and Services - Admitted Since 4/20/2022    Coordination has not been started for this encounter.     Selected Continued Care - Prior Encounters Includes selections from prior encounters from 1/20/2022 to 4/21/2022    Discharged on 4/20/2022 Admission date: 4/5/2022 - Discharge disposition: Rehab Facility or Unit ([facility])    Home Medical Care     Service Provider Selected Services Address Phone Fax Patient Preferred    [facility]  [facility] [address] [address] [phone] --                Discharged on 3/2/2022 Admission date: 2/14/2022 - [place] [facility] [place]     Service Provider Selected Services Address Phone Fax Patient Preferred    [facility]  [facility] [address] [address] [phone]  [phone] --                       Demographic Summary    No documentation.                Functional Status     Row Name 04/21/22 1534       Functional Status    Usual Activity Tolerance moderate    Current Activity Tolerance fair    Functional Status Comments Patient was using rolling walker at home prior to hospitalization.       Functional Status, IADL    Medications assistive person    Meal Preparation assistive person    Housekeeping assistive person    Laundry assistive person    Shopping assistive person    IADL Comments Wife does most of household chores but patient was able to help prior to health issues.       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 , , and auto  in past               Psychosocial     Row Name 04/21/22 1537       Values/Beliefs    Spiritual, Cultural Beliefs, Confucianism Practices, Values that Affect Care no    Values/Beliefs Comment Tenriism       Behavior WDL    Behavior WDL interactions    Interactions cooperative;eye contact appropriate;appropriate to situation       Emotion Mood WDL    Emotion/Mood/Affect WDL emotion mood    Emotion/Mood appropriate to situation       Speech WDL    Speech WDL WDL       Perceptual State WDL    Perceptual State WDL WDL       Thought Process WDL    Thought Process WDL WDL       Intellectual Performance WDL    Intellectual Performance WDL WDL       Coping/Stress    Major Change/Loss/Stressor medical condition/diagnosis;loss of independence    Patient Personal Strengths motivated;expressive of needs;strong support system;positive attitude;successful coping history    Sources of Support adult child(tona);spouse    Techniques to [place] with Loss/Stress/Change diversional  activities    Reaction to Health Status adjusting;hopeful;motivated    Understanding of Condition and Treatment adequate understanding of medical condition;adequate understanding of treatment       Developmental Stage (Eriksson's)    Developmental Stage Stage 8 (65 years-death/Late Adulthood) Integrity vs. Despair               Abuse/Neglect    No documentation.                Legal     Row Name 04/21/22 1538       Financial/Legal    Source of Income social security    Who Manages Finances if Patient Unable Wife               Substance Abuse    No documentation.                Patient Forms    No documentation.                   ROB Peralta

## 2022-04-21 NOTE — PROGRESS NOTES
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  The hip weakness is probably  commendation of hematoma and iliopsoas as well as nerve impingement.  He is  weaker with knee extension compared to hip adduction which would point to also  component of femoral nerve palsy.    Reviewed features of upper lumbosacral plexopathy greater than lower lumbosacral  plexopathy with the patient and his wife, reviewed timeframe of recovery is  typically measured in months and may be incomplete, and reviewed need for  bracing which will be determined and see how he progresses with his  rehabilitation program.  Reviewed different styles of knee orthosis including  dynamic knee extension versus lockable knee unit depending on level of stability  he needs.      History of mechanical heart valve-  Mechanical aortic valve replacement  Prior CVA with residual left-sided weakness (INR at 2.2)  Chronic anticoagulation with goal INR of 3 - 3.5  INR goal 3.0-3.5 secondary to previous stroke-pharmacy managing  April 21-INR 2.92.  Discontinue Lovenox.  Continue Coumadin    Atrial fibrillation-digoxin/metoprolol  Nonischemic cardiomyopathy.  Hypertension.    Impaired mobility/impaired self-care    History of lower GI bleed with hematochezia in November 2021  Right rectus sheath hematoma in November 2021    Chronic kidney disease stage III  History of renal cell carcinoma and right nephrectomy    Hyperglycemia-Lantus  April 21-blood glucose low this morning and decrease Lantus    Anemia-ferrous sulfate    Recent C. difficile  colitis-completed vancomycin taper      Plan of Care  Anticipated Discharge Date/Estimated Length of Stay: ELOS: 2 weeks  Anticipated Discharge Destination: Community discharge with assistance  Discharge Plan : [place] with spouse  Medical Necessity Expected Level Rationale: Goals are to achieve a level of  contact-guard assist with  mobility and self-care and improved strength.  Rehabilitation prognosis indeterminate.  Medical prognosis indeterminate.  Intensity and Duration: an average of 3 hours/5 days per week  Medical Supervision and 24 Hour Rehab Nursing: x  Physical Therapy: x  PT Intensity/Duration: 1.5 hours/day, 5 days/week for approximately 10 days  Occupational Therapy: x  OT Intensity/Duration: 1.5 hours/day, 5 days/week for approximately 10 days  Social Work: x  Therapeutic Recreation: x  Updated (if changes indicated)  No changes to plan.    Based on the patient's medical and functional status, their prognosis and  expected level of functional improvement is: Goals are to achieve a level of  contact-guard assist with  mobility and self-care and improved strength.  Rehabilitation prognosis indeterminate.  Medical prognosis indeterminate.    Interdisciplinary Problem/Goals/Status  Body Systems    [RN] Integumentary(Active)  Current Status(04/21/2022): Pt has blanchable redness to coccyx with dry,  peeling skin to coccyx and buttocks. No open area noted. Excoriation to groin.  Scattered bruising. 3 glued lap sites to abd. Pt able to turn in bed.  Weekly Goal(04/28/2022): Skin will remain free from s/s infection.  Discharge Goal: Skin will remain free from further breakdown.        Mobility    [OT] Toilet Transfers(Active)  Current Status(04/21/2022): Mod squat pivot  Weekly Goal(04/28/2022): Min squat pivot  Discharge Goal: CGA        Psychosocial    [RN] Coping/Adjustment(Active)  Current Status(04/21/2022): Patient is at risk for reduced coping r/t recent  hospitalization and new mobility issues. Pt is  A/Ox4 but can be forgetful. Pt  has a supportive spouse.  Weekly Goal(04/28/2022): Pt will verbalize needs, concerns, feelings to staff as  needed.  Discharge Goal: Pt will be able to verbalize adequate coping strategies he can  use at home.        Safety    [RN] Potential for Injury(Active)  Current Status(04/21/2022): Pt is at increased risk for falls/injury r/t recent  hospitalization and reduced mobility.  Weekly Goal(04/28/2022): Pt will call appropriately for assistance as needed.  Discharge Goal: Pt will remain free from falls/injury.        Self Care    [OT] Bathing(Active)  Current Status(04/21/2022): Mod with 2 for standing  Weekly Goal(04/28/2022): Mod  Discharge Goal: cga    [OT] Dressing (Lower)(Active)  Current Status(04/21/2022): dep x 2  Weekly Goal(04/28/2022): Max  Discharge Goal: Min    [OT] Dressing (Upper)(Active)  Current Status(04/21/2022): sba  Weekly Goal(04/28/2022): setup  Discharge Goal: setup    [OT] Grooming(Active)  Current Status(04/21/2022): sba seated  Weekly Goal(04/28/2022): setup  Discharge Goal: Mod Indep    [OT] Toileting(Active)  Current Status(04/21/2022): dep 2 persons  Weekly Goal(04/28/2022): Max x 1  Discharge Goal: CGA        Sphincter Control    [RN] Bladder Management(Active)  Current Status(04/21/2022): Pt can be continent and incontinent of urine.  Weekly Goal(04/28/2022): Pt will be 50% continent.  Discharge Goal: Pt will be 75% continent of urine.    [RN] Bowel Management(Active)  Current Status(04/21/2022): Pt is reportedly incontinent of stool.  Weekly Goal(04/28/2022): Pt will be 50% continent.  Discharge Goal: Pt will be continent 75% of the time.      Comments:    Signed by: Grant Andrade MD

## 2022-04-22 LAB
ANION GAP SERPL CALCULATED.3IONS-SCNC: 9.7 MMOL/L (ref 5–15)
BUN SERPL-MCNC: 14 MG/DL (ref 8–23)
BUN/CREAT SERPL: 11.9 (ref 7–25)
CALCIUM SPEC-SCNC: 8.6 MG/DL (ref 8.6–10.5)
CHLORIDE SERPL-SCNC: 101 MMOL/L (ref 98–107)
CO2 SERPL-SCNC: 24.3 MMOL/L (ref 22–29)
CREAT SERPL-MCNC: 1.18 MG/DL (ref 0.76–1.27)
DEPRECATED RDW RBC AUTO: 50.4 FL (ref 37–54)
EGFRCR SERPLBLD CKD-EPI 2021: 60.8 ML/MIN/1.73
ERYTHROCYTE [DISTWIDTH] IN BLOOD BY AUTOMATED COUNT: 16.1 % (ref 12.3–15.4)
GLUCOSE BLDC GLUCOMTR-MCNC: 131 MG/DL (ref 70–130)
GLUCOSE BLDC GLUCOMTR-MCNC: 135 MG/DL (ref 70–130)
GLUCOSE BLDC GLUCOMTR-MCNC: 159 MG/DL (ref 70–130)
GLUCOSE BLDC GLUCOMTR-MCNC: 76 MG/DL (ref 70–130)
GLUCOSE SERPL-MCNC: 156 MG/DL (ref 65–99)
HCT VFR BLD AUTO: 33.9 % (ref 37.5–51)
HGB BLD-MCNC: 10.4 G/DL (ref 13–17.7)
INR PPP: 2.52 (ref 0.9–1.1)
INR PPP: 2.63 (ref 0.9–1.1)
MCH RBC QN AUTO: 25.9 PG (ref 26.6–33)
MCHC RBC AUTO-ENTMCNC: 30.7 G/DL (ref 31.5–35.7)
MCV RBC AUTO: 84.5 FL (ref 79–97)
PLATELET # BLD AUTO: 263 10*3/MM3 (ref 140–450)
PMV BLD AUTO: 11.4 FL (ref 6–12)
POTASSIUM SERPL-SCNC: 4.6 MMOL/L (ref 3.5–5.2)
PROTHROMBIN TIME: 27.3 SECONDS (ref 11.7–14.2)
PROTHROMBIN TIME: 28.2 SECONDS (ref 11.7–14.2)
RBC # BLD AUTO: 4.01 10*6/MM3 (ref 4.14–5.8)
SODIUM SERPL-SCNC: 135 MMOL/L (ref 136–145)
WBC NRBC COR # BLD: 4.9 10*3/MM3 (ref 3.4–10.8)

## 2022-04-22 PROCEDURE — 97110 THERAPEUTIC EXERCISES: CPT

## 2022-04-22 PROCEDURE — 85027 COMPLETE CBC AUTOMATED: CPT | Performed by: PHYSICAL MEDICINE & REHABILITATION

## 2022-04-22 PROCEDURE — 85610 PROTHROMBIN TIME: CPT | Performed by: PHYSICAL MEDICINE & REHABILITATION

## 2022-04-22 PROCEDURE — 97535 SELF CARE MNGMENT TRAINING: CPT

## 2022-04-22 PROCEDURE — 80048 BASIC METABOLIC PNL TOTAL CA: CPT | Performed by: PHYSICAL MEDICINE & REHABILITATION

## 2022-04-22 PROCEDURE — 82962 GLUCOSE BLOOD TEST: CPT

## 2022-04-22 PROCEDURE — 97116 GAIT TRAINING THERAPY: CPT

## 2022-04-22 PROCEDURE — 97530 THERAPEUTIC ACTIVITIES: CPT

## 2022-04-22 RX ORDER — WARFARIN SODIUM 10 MG/1
10 TABLET ORAL
Status: COMPLETED | OUTPATIENT
Start: 2022-04-22 | End: 2022-04-22

## 2022-04-22 RX ORDER — WARFARIN SODIUM 7.5 MG/1
7.5 TABLET ORAL
Status: DISCONTINUED | OUTPATIENT
Start: 2022-04-22 | End: 2022-04-22

## 2022-04-22 RX ADMIN — NYSTATIN 1 APPLICATION: 100000 POWDER TOPICAL at 07:49

## 2022-04-22 RX ADMIN — DIGOXIN 62.5 MCG: 125 TABLET ORAL at 11:27

## 2022-04-22 RX ADMIN — ATORVASTATIN CALCIUM 40 MG: 20 TABLET, FILM COATED ORAL at 07:48

## 2022-04-22 RX ADMIN — INSULIN GLARGINE-YFGN 12 UNITS: 100 INJECTION, SOLUTION SUBCUTANEOUS at 21:51

## 2022-04-22 RX ADMIN — Medication 1 CAPSULE: at 07:48

## 2022-04-22 RX ADMIN — FAMOTIDINE 20 MG: 20 TABLET ORAL at 06:21

## 2022-04-22 RX ADMIN — MAGNESIUM OXIDE 400 MG (241.3 MG MAGNESIUM) TABLET 400 MG: TABLET at 07:47

## 2022-04-22 RX ADMIN — WARFARIN 10 MG: 10 TABLET ORAL at 16:57

## 2022-04-22 RX ADMIN — MAGNESIUM OXIDE 400 MG (241.3 MG MAGNESIUM) TABLET 400 MG: TABLET at 21:50

## 2022-04-22 RX ADMIN — METOPROLOL SUCCINATE 12.5 MG: 25 TABLET, EXTENDED RELEASE ORAL at 07:47

## 2022-04-22 RX ADMIN — FAMOTIDINE 20 MG: 20 TABLET ORAL at 16:52

## 2022-04-22 RX ADMIN — FERROUS SULFATE TAB 325 MG (65 MG ELEMENTAL FE) 325 MG: 325 (65 FE) TAB at 07:47

## 2022-04-22 NOTE — PROGRESS NOTES
Inpatient Rehabilitation Plan of Care Note    Plan of Care  Care Plan Reviewed - No updates at this time.    Psychosocial    Performed Intervention(s)  Provide distraction and/or relaxation as needed.  Allow extra time for patient to verbalize needs, feelings, concerns, etc.      Safety    Performed Intervention(s)  Hourly rounding.  Fall precautions: bed low and locked, patient belongings adn call light w/in  reach, nonskid socks and gait belt in use, bed and chair alarms in use.      Sphincter Control    Performed Intervention(s)  Monitor I/O.  Toileting schedule while awake.  Assist with urinal as needed, use incontinence products as needed.      Body Systems    Performed Intervention(s)  barrier cream and powder, as ordered.  Skin assessment q shift and prn  Low air loss bed (waffle overlay not available per cpd).  Turn q2h or encourage turning.    Signed by: Shanae Morales RN

## 2022-04-22 NOTE — THERAPY TREATMENT NOTE
Inpatient Rehabilitation - Occupational Therapy Treatment Note    [facility]     Patient Name: Francisco Sequeira  : 1935  MRN: [id number]    Today's Date: 2022                 Admit Date: 2022         ICD-10-CM ICD-9-CM   1. 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)   • Iron deficiency anemia   • Chronic anticoagulation   • Hemiparesis of left nondominant side as late effect of cerebral infarction (HCC)   • Rectus sheath hematoma   • Sepsis (HCC)   • Calculus of gallbladder with acute cholecystitis without obstruction   • History of Clostridium difficile infection   • Aspiration pneumonitis (HCC)   • Hematoma of iliopsoas muscle, left, initial encounter   • History of CVA (cerebrovascular accident)   • S/P laparoscopic cholecystectomy   • Anemia   • Hematoma of left iliopsoas muscle       Past Medical History:   Diagnosis Date   • Allergic rhinitis    • Aortic valve insufficiency    • Ascending aortic aneurysm (HCC)    • Atrial fibrillation (HCC)    • Bacteremia    • Calcific aortic stenosis of bicuspid valve    • Cardiac arrest (HCC)    • Cardiomyopathy (HCC)    • CKD (chronic kidney disease) stage 3, GFR 30-59 ml/min (HCC)    • Contact dermatitis due to poison ivy    • Elbow fracture    • Esophageal reflux    • GERD (gastroesophageal reflux disease)    • Head injury    • History of transfusion    • Hyperglycemia    • Hyperlipidemia    • Hypertension    • Kidney carcinoma (HCC)    • Nonischemic  cardiomyopathy (HCC)    • Renal oncocytoma    • Seasonal allergic reaction    • Sinusitis    • Syncope    • Type 2 diabetes mellitus (HCC)     uncontrolled   • Visual field defect        Past Surgical History:   Procedure Laterality Date   • AORTIC VALVE REPAIR/REPLACEMENT     • ASCENDING AORTIC ANEURYSM REPAIR W/ MECHANICAL AORTIC VALVE REPLACEMENT     • CHOLECYSTECTOMY WITH INTRAOPERATIVE CHOLANGIOGRAM N/A 3/29/2022    Procedure: Laparoscopic cholecystectomy with cholangiogram, possible open;  Surgeon: Lisa Guidry MD;  Location: [facility] MAIN OR;  Service: General;  Laterality: N/A;   • COLONOSCOPY N/A 10/28/2021    Procedure: COLONOSCOPY to cecum:  cold snare polyps,;  Surgeon: Dinesh Meza MD;  Location: [facility] ENDOSCOPY;  Service: Gastroenterology;  Laterality: N/A;  pre:  Iron deficiency anemia  post:  polyps, diverticulosis,    • COLONOSCOPY N/A 11/9/2021    Procedure: COLONOSCOPY to cecum with APC cautery of AVM and clip placement x1;  Surgeon: Pavan Rodriguez MD;  Location: [facility] ENDOSCOPY;  Service: Gastroenterology;  Laterality: N/A;  PRE - gi bleed, anemia  POST - diverticulosis, poor prep, AVM right colon   • ENDOSCOPY N/A 10/28/2021    Procedure: ESOPHAGOGASTRODUODENOSCOPY with biopsies;  Surgeon: Dinesh Meza MD;  Location: [facility] ENDOSCOPY;  Service: Gastroenterology;  Laterality: N/A;  pre:  Iron deficiency anemia  post:  duodenitis and gastritis   • EYE SURGERY  12/09/2020    cataract surgery    • NEPHRECTOMY     • OTHER SURGICAL HISTORY      elbow surgery   • OTHER SURGICAL HISTORY      right arm surgery   • PROSTATE SURGERY     • THORACENTESIS Left     diagnostic             IRF OT ASSESSMENT FLOWSHEET (last 12 hours)     IRF OT Evaluation and Treatment     Row Name 04/22/22 1547          OT Time and Intention    Document Type daily treatment  -DN     Mode of Treatment occupational therapy  -DN     Patient Effort good  -DN     Row Name 04/22/22 1549          Pain  Assessment    Pretreatment Pain Rating 0/10 - no pain  -DN     Posttreatment Pain Rating 0/10 - no pain  -DN     Row Name 04/22/22 1547          Cognition/Psychosocial    Affect/Mental Status (Cognition) WFL  -DN     Orientation Status (Cognition) oriented x 4  -DN     Personal Safety Interventions fall prevention program maintained;gait belt  -DN     Cognitive Function safety deficit  -DN     Safety Deficit (Cognition) insight into deficits/self-awareness  -DN     Row Name 04/22/22 1547          Bathing    [place] Level (Bathing) bathing skills;moderate assist (50% patient effort);verbal cues;nonverbal cues (demo/gesture)  -DN     Position (Bathing) sink side;supported sitting;supported standing  -DN     Comment (Bathing) have 2 persons for standing due to buckle of LLE at times  -DN     Row Name 04/22/22 1547          Upper Body Dressing    [place] Level (Upper Body Dressing) upper body dressing skills;doff;don;pull over garment;supervision  -DN     Position (Upper Body Dressing) supported sitting  -DN     Row Name 04/22/22 1547          Lower Body Dressing    [place] Level (Lower Body Dressing) doff;don;pants/bottoms;shoes/slippers;socks;underwear;moderate assist (50% patient effort);verbal cues;nonverbal cues (demo/gesture)  -DN     Position (Lower Body Dressing) supported sitting;supported standing  -DN     Set-up Assistance (Lower Body Dressing) obtain clothing  -DN     Comment (Lower Body Dressing) 2 persons to stand  -DN     Row Name 04/22/22 1547          Grooming    [place] Level (Grooming) grooming skills;supervision  -DN     Position (Grooming) sink side;supported sitting  -DN     Row Name 04/22/22 1547          Toileting    [place] Level (Toileting) toileting skills;adjust/manage clothing;perform perineal hygiene;maximum assist (25% patient effort)  -DN     Assistive Device Use (Toileting) --  drop arm commode  -DN     Position (Toileting) supported sitting;supported standing   -DN     Comment (Toileting) RWX in front when standing have a second person for safety  -DN     Row Name 04/22/22 1547          Transfers    Bed-Chair [place] (Transfers) moderate assist (50% patient effort);nonverbal cues (demo/gesture);verbal cues  -DN     Chair-Bed [place] (Transfers) moderate assist (50% patient effort);verbal cues;nonverbal cues (demo/gesture)  -DN     Assistive Device (Bed-Chair Transfers) wheelchair  -DN     [place] Level (Toilet Transfer) moderate assist (50% patient effort);verbal cues;nonverbal cues (demo/gesture)  -DN     Assistive Device (Toilet Transfer) commode, bedside with drop arms;wheelchair  -DN     Row Name 04/22/22 1547          Bed-Chair Transfer    Comment, (Bed-Chair Transfer) squat pivot vc for scoot forward, foot placement and count of 3 momentum  -DN     Row Name 04/22/22 1547          Chair-Bed Transfer    Assistive Device (Chair-Bed Transfers) wheelchair  -DN     Comment, (Chair-Bed Transfer) squat pivot  -DN     Row Name 04/22/22 1547          Toilet Transfer    Type (Toilet Transfer) squat pivot  -DN     Comment, (Toilet Transfer) vc for safe technique  -DN     Row Name 04/22/22 1547          Shoulder (Therapeutic Exercise)    Shoulder (Therapeutic Exercise) strengthening exercise  arm bike seated for 2 minutes  -DN     Shoulder AROM (Therapeutic Exercise) bilateral;10 repetitions;3 sets  -DN     Shoulder Strengthening (Therapeutic Exercise) 1 lb free weight;yellow;resistance band  dowel tesha for press and  style press  -DN     Row Name 04/22/22 1547          Elbow/Forearm (Therapeutic Exercise)    Elbow/Forearm (Therapeutic Exercise) strengthening exercise  -DN     Elbow/Forearm AROM (Therapeutic Exercise) bilateral;10 repetitions;3 sets  -DN     Elbow/Forearm Strengthening (Therapeutic Exercise) 2 lb free weight  -DN     Row Name 04/22/22 1547          Wrist (Therapeutic Exercise)    Wrist (Therapeutic Exercise) strengthening exercise  -DN      Wrist AROM (Therapeutic Exercise) bilateral;10 repetitions;3 sets  -DN     Wrist Strengthening (Therapeutic Exercise) 2 lb free weight  -DN     Row Name 04/22/22 1547          Hand (Therapeutic Exercise)    Hand (Therapeutic Exercise) strengthening exercise  -DN     Hand Strengthening (Therapeutic Exercise) bilateral;hand gripper  -DN     Row Name 04/22/22 1547          Positioning and Restraints    Pre-Treatment Position sitting in chair/recliner  -DN     Post Treatment Position bed  -DN     In Bed call light within reach;encouraged to call for assist;exit alarm on  -DN           User Key  (r) = Recorded By, (t) = Taken By, (c) = Cosigned By    Initials Name Effective Dates    Francisco Batista OT 06/16/21 -                  Occupational Therapy Education                 Title: PT OT SLP Therapies (In Progress)     Topic: Occupational Therapy (Not Started)     Point: ADL training (Not Started)     Description:   Instruct learner(s) on proper safety adaptation and remediation techniques during self care or transfers.   Instruct in proper use of assistive devices.              Learner Progress:  Not documented in this visit.          Point: Home exercise program (Not Started)     Description:   Instruct learner(s) on appropriate technique for monitoring, assisting and/or progressing therapeutic exercises/activities.              Learner Progress:  Not documented in this visit.          Point: Precautions (Not Started)     Description:   Instruct learner(s) on prescribed precautions during self-care and functional transfers.              Learner Progress:  Not documented in this visit.          Point: Body mechanics (Not Started)     Description:   Instruct learner(s) on proper positioning and spine alignment during self-care, functional mobility activities and/or exercises.              Learner Progress:  Not documented in this visit.                                OT Recommendation and Plan    Planned Therapy  Interventions (OT): activity tolerance training, adaptive equipment training, BADL retraining, ROM/therapeutic exercise, strengthening exercise, transfer/mobility retraining                    Time Calculation:      Time Calculation- OT     Row Name 04/22/22 1330 04/22/22 0930          Time Calculation- OT    OT Start Time 1330  -DN 0930  -DN     OT Stop Time 1415  -DN 1030  -DN     OT Time Calculation (min) 45 min  -DN 60 min  -DN           User Key  (r) = Recorded By, (t) = Taken By, (c) = Cosigned By    Initials Name Provider Type    Francisco Batista OT Occupational Therapist              Therapy Charges for Today     Code Description Service Date Service Provider Modifiers Qty    63188351198 HC OT EVAL MOD COMPLEXITY 3 4/21/2022 Francisco Jane OT GO 1    52007966965 HC OT SELF CARE/MGMT/TRAIN EA 15 MIN 4/21/2022 Francisco Jane OT GO 4    97930405664 HC OT SELF CARE/MGMT/TRAIN EA 15 MIN 4/22/2022 Francisco Jane OT GO 4    43679267089 HC OT THER PROC EA 15 MIN 4/22/2022 Francisco Jane OT GO 3                   Francisco Jane OT  4/22/2022

## 2022-04-22 NOTE — PLAN OF CARE
Goal Outcome Evaluation:  Plan of Care Reviewed With: patient      Pt is alert and oriented. Pale. Soft spoken. Left leg weakness.   Fatigues easily. Up with assist x 2 people. Used bedside commode to have large BM today.  Pt incontinent of urine throughout the day. Meds taken whole with applesauce. Denies chest pain and shortness of breath. Infrequent cough. Wife at bedside.  Has low air loss mattress. No unsafe behavior seen.

## 2022-04-22 NOTE — PROGRESS NOTES
LOS: 2 days   Patient Care Team:  Rubin Hinkle APRN as PCP - General (Family Medicine)  Audra Vazquez RODYD as Pharmacist  Vasquez Niño PharmD as Pharmacist (Pharmacy)  Tatiana Tinoco MD as Consulting Physician (Gastroenterology)      LAURA XIE  1935    Diagnoses    1. 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  Hypertension.  Impaired mobility/impaired self-care  Chronic kidney disease stage III  Hyperglycemia-Lantus  Anemia-ferrous sulfate  Recent C. difficile colitis-completed vancomycin taper      Subjective   Patient reports tolerating therapies.  Weakness in the left leg is the same.  Has some decreased sensation at the left thigh compared to the right thigh on questioning.    Comfortable with his breathing.  Reviewed plans on transitioning Lovenox to Coumadin, resuming Lovenox if INR drops below 2.5.    Objective     Vitals:    04/22/22 1212   BP: 107/55   Pulse: 80   Resp: 18   Temp: 97.7 °F (36.5 °C)   SpO2: 99%       PHYSICAL EXAM:      MENTAL STATUS -  AWAKE / ALERT  HEENT- NCAT,   SCLERAE ANICTERIC, CONJUNCTIVAE PINK, OP MOIST, NO JVD, EARS UNREMARKABLE EXTERNALLY  LUNGS - CTA, NO WHEEZES, RALES OR RHONCHI  HEART-irregular NO RUB, MURMUR, OR GALLOP  Prosthetic click  ABD - NORMOACTIVE BOWEL SOUNDS, SOFT, NT. NO HEPATOSPLENOMEGALY APPRECIATED  EXT - NO EDEMA OR CYANOSIS  NEURO -reports light touch present in the dermatomes of the bilateral lower extremities.  Proprioception accurate at the great toes bilaterally.  MOTOR EXAM - RUE/RLE 5/5. LUE 5/5.    Left hip flexion 2/5,  adduction takes resistance, hip extension-active, knee extension 0/5, knee flexion 0/5, ankle dorsiflexion 4/5, EHL 4/5, ankle plantarflexion takes resistance         MEDICATIONS  Scheduled Meds:atorvastatin, 40 mg, Oral, Daily  digoxin, 62.5 mcg, Oral, Daily  famotidine, 20 mg, Oral, BID AC  ferrous sulfate, 325 mg, Oral, Every Other Day  insulin glargine, 12 Units, Subcutaneous, Nightly  insulin lispro, 0-9 Units, Subcutaneous, TID AC  lactobacillus acidophilus, 1 capsule, Oral, Daily  magnesium oxide, 400 mg, Oral, BID  metoprolol succinate XL, 12.5 mg, Oral, Daily  nystatin, , Topical, Q12H  warfarin, 10 mg, Oral, Once      Continuous Infusions:Pharmacy to dose warfarin,       PRN Meds:.•  acetaminophen **OR** [DISCONTINUED] acetaminophen **OR** [DISCONTINUED] acetaminophen  •  calcium carbonate  •  dextrose  •  dextrose  •  diphenhydrAMINE  •  glucagon (human recombinant)  •  hydrocortisone-bacitracin-zinc oxide-nystatin  •  nitroglycerin  •  ondansetron **OR** ondansetron  •  Pharmacy to dose warfarin      RESULTS  Glucose   Date/Time Value Ref Range Status   04/22/2022 1127 135 (H) 70 - 130 mg/dL Final     Comment:     Meter: [id number] : [id number] Emigdio Alston    04/22/2022 0558 76 70 - 130 mg/dL Final     Comment:     Meter: [id number] : [id number] Chaparro Saunders RN   04/21/2022 2005 159 (H) 70 - 130 mg/dL Final     Comment:     Meter: [id number] : [id number] Floresita [facility]   04/21/2022 1629 118 70 - 130 mg/dL Final     Comment:     Meter: [id number] : [id number] Emigdio Trenta    04/21/2022 1127 124 70 - 130 mg/dL Final     Comment:     Meter: [id number] : [id number] Emigdio Alston    04/21/2022 0632 68 (L) 70 - 130 mg/dL Final     Comment:     Meter: [id number] : [id number] Dulal Wendy    04/20/2022 2009 181 (H) 70 - 130 mg/dL Final     Comment:     Meter: [id number] : [id number] Floresita ALEJO   04/20/2022 1600 102 70 - 130 mg/dL Final     Comment:     Meter: [id number]  : [id number] Anurag ALEJO     Results from last 7 days   Lab Units 04/22/22  0926 04/21/22  0821 04/20/22  0710   WBC 10*3/mm3 4.90 3.38* 3.28*   HEMOGLOBIN g/dL 10.4* 9.2* 9.7*   HEMATOCRIT % 33.9* 30.4* 30.5*   PLATELETS 10*3/mm3 263 213 219     Results from last 7 days   Lab Units 04/22/22  0926 04/20/22  0710 04/19/22  0723   SODIUM mmol/L 135* 138 137   POTASSIUM mmol/L 4.6 4.4 4.4   CHLORIDE mmol/L 101 106 105   CO2 mmol/L 24.3 26.7 27.2   BUN mg/dL 14 16 14   CREATININE mg/dL 1.18 1.12 1.10   CALCIUM mg/dL 8.6 8.4* 8.4*   GLUCOSE mg/dL 156* 70 96       Results from last 7 days   Lab Units 04/22/22  0926 04/21/22  0821 04/20/22  0710   INR  2.52* 2.92* 2.07*         Assessment/Plan     Hematoma of left iliopsoas muscle      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  The hip weakness is probably commendation of hematoma and iliopsoas as well as nerve impingement.  He is weaker with knee extension compared to hip adduction which would point to also component of femoral nerve palsy.     Reviewed features of upper lumbosacral plexopathy greater than lower lumbosacral plexopathy with the patient and his wife, reviewed timeframe of recovery is typically measured in months and may be incomplete, and reviewed need for bracing which will be determined and see how he progresses with his rehabilitation program.  Reviewed different styles of knee orthosis including dynamic knee extension versus lockable knee unit depending on level of stability he needs.        History of mechanical heart valve-  Mechanical aortic valve replacement  Prior CVA with residual left-sided weakness (INR at 2.2)  Chronic anticoagulation with goal INR of 3 - 3.5  INR goal 3.0-3.5 secondary to previous stroke-pharmacy managing  April 21-INR 2.92.   Discontinue Lovenox.  Continue Coumadin  April 22-INR trended back down to 2.5 to.  Had been on Coumadin 7.5 mg through April 19, received 10 mg on April 20, increased INR from 2.07 to 2.92.  Given the rate of  increase in INR, Coumadin was held yesterday.  INR trended back down to 2.52 today.  Anticipate Coumadin 10 mg dose today.  Will recheck INR this afternoon around 3:30 PM and if trend lower, will give Lovenox 70 mg subcutaneous x1 and recheck INR in the a.m.. Concern would be for re-bleed as previously bleed on ASA and Lovenox to coumadin transition when INR was 1.6. Previous stroke when INR was 2.2.     INR goal 3.0-3.5     Atrial fibrillation-digoxin/metoprolol  Nonischemic cardiomyopathy.  Hypertension.     Impaired mobility/impaired self-care     History of lower GI bleed with hematochezia in November 2021  Right rectus sheath hematoma in November 2021     Chronic kidney disease stage III  History of renal cell carcinoma and right nephrectomy      Hyperglycemia-Lantus  April 21-blood glucose low this morning and decrease Lantus     Anemia-ferrous sulfate     Recent C. difficile colitis-completed vancomycin taper     Now admit for comprehensive acute inpatient rehabilitation .  This would be an interdisciplinary program with physical therapy 1.5 hour,  occupational therapy 1.5 hour,  5 days a week.  Rehabilitation nursing for carryover, monitoring of cardiac and neurologic   status, bowel and bladder, and skin  Ongoing physician follow-up.  Weekly team conferences.  Goals are to achieve a level of contact-guard assist with  mobility and self-care and improved strength.   Rehabilitation prognosis indeterminate.  Medical prognosis indeterminate.  Estimated length of stay is approximately 2 weeks, but is only an estimation.      The patient's functional status and clinical status is unchanged from preadmission assessment and the patient continues appropriate for acute inpatient rehabilitation.  Goal is for  home with outpatient   therapies.  Barrier to discharge: Impaired mobility and self-care- work on strength, transfers, balance, progressive ambulation, bracing, activities of daily living to overcome.            Grant Andrade MD      During rounds, used appropriate personal protective equipment including mask and gloves.  Additional gown if indicated.  Mask used was standard procedure mask. Appropriate PPE was worn during the entire visit.  Hand hygiene was completed before and after.

## 2022-04-22 NOTE — PLAN OF CARE
Goal Outcome Evaluation:      Pt A/Ox4, calm, cooperative, not OOB overnight. Pt able to turn in bed independently; pt often refuses turning and pillow support so he can turn himself as needed. Pt is on low air loss mattress.  Sacral mepilex to coccyx. Pt was incontinent of urine overnight.  Meds taken whole in puree. No c/o pain overnight.

## 2022-04-22 NOTE — PROGRESS NOTES
Results from last 7 days   Lab Units 04/22/22  0926 04/21/22  0821 04/20/22  0710   INR  2.52* 2.92* 2.07*     INR trended back down to 2.5 to.  Had been on Coumadin 7.5 mg through April 19, received 10 mg on April 20, increased INR from 2.07 to 2.92.  Given the rate of  increase in INR, Coumadin was held yesterday.  INR trended back down to 2.52 today.  Anticipate Coumadin 10 mg dose today.  Will recheck INR this afternoon around 3:30 PM and if trend lower, will give Lovenox 70 mg subcutaneous x1 and recheck INR in the a.m.. Concern would be for re-bleed as previously bleed on ASA and Lovenox to coumadin transition when INR was 1.6. Previous stroke when INR was 2.2.     INR goal 3.0-3.5

## 2022-04-22 NOTE — PROGRESS NOTES
[facility] Clinical Pharmacy Services: Warfarin Dosing/Monitoring Consult    Francisco Sequeira is a 86 y.o. male, estimated creatinine clearance is 39.7 mL/min (by C-G formula based on SCr of 1.18 mg/dL). weighing  .    Results from last 7 days   Lab Units 04/22/22  0926 04/21/22  0821 04/20/22  0710 04/19/22  0723 04/18/22  0721   INR  2.52* 2.92* 2.07* 1.82* 1.57*   HEMOGLOBIN g/dL 10.4* 9.2* 9.7* 9.3* 9.2*   HEMATOCRIT % 33.9* 30.4* 30.5* 29.2* 29.4*   PLATELETS 10*3/mm3 263 213 219 217 214     Prior to admission anticoagulation: warfarin 7.5 mg daily    Hospital Anticoagulation:  Consulting provider: Dr. Andrade  Start date: 4/20/22  Indication: Mechanical valve  Target INR:  3-3.5  Expected duration: tbd   Bridge Therapy: Yes  with enoxaparin    Potential food or drug interactions: none currently    Education complete?/Date: Home medication    Assessment/Plan:  INR remains subtherapeutic today at 2.52  Spoke with Dr. Andrade and will give one time 10mg dose today and recheck INR at 1530.  If INR is below 2.5; then will re-initiate enoxaparin 1mg/kg  Monitor for any signs or symptoms of bleeding  Follow up daily INRs and dose adjustments    Pharmacy will continue to follow until discharge or discontinuation of warfarin.     Mathieu Moser [facility]  Clinical Pharmacist

## 2022-04-22 NOTE — THERAPY TREATMENT NOTE
Inpatient Rehabilitation - Physical Therapy Treatment Note       [facility]     Patient Name: Francisco Sequeira  : 1935  MRN: [id number]    Today's Date: 2022                    Admit Date: 2022      Visit Dx:     ICD-10-CM ICD-9-CM   1. 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)   • Iron deficiency anemia   • Chronic anticoagulation   • Hemiparesis of left nondominant side as late effect of cerebral infarction (HCC)   • Rectus sheath hematoma   • Sepsis (HCC)   • Calculus of gallbladder with acute cholecystitis without obstruction   • History of Clostridium difficile infection   • Aspiration pneumonitis (HCC)   • Hematoma of iliopsoas muscle, left, initial encounter   • History of CVA (cerebrovascular accident)   • S/P laparoscopic cholecystectomy   • Anemia   • Hematoma of left iliopsoas muscle       Past Medical History:   Diagnosis Date   • Allergic rhinitis    • Aortic valve insufficiency    • Ascending aortic aneurysm (HCC)    • Atrial fibrillation (HCC)    • Bacteremia    • Calcific aortic stenosis of bicuspid valve    • Cardiac arrest (HCC)    • Cardiomyopathy (HCC)    • CKD (chronic kidney disease) stage 3, GFR 30-59 ml/min (HCC)    • Contact dermatitis due to poison ivy    • Elbow fracture    • Esophageal reflux    • GERD (gastroesophageal reflux disease)    • Head injury    • History of transfusion    • Hyperglycemia    • Hyperlipidemia    • Hypertension    • Kidney carcinoma (HCC)    •  Nonischemic cardiomyopathy (HCC)    • Renal oncocytoma    • Seasonal allergic reaction    • Sinusitis    • Syncope    • Type 2 diabetes mellitus (HCC)     uncontrolled   • Visual field defect        Past Surgical History:   Procedure Laterality Date   • AORTIC VALVE REPAIR/REPLACEMENT     • ASCENDING AORTIC ANEURYSM REPAIR W/ MECHANICAL AORTIC VALVE REPLACEMENT     • CHOLECYSTECTOMY WITH INTRAOPERATIVE CHOLANGIOGRAM N/A 3/29/2022    Procedure: Laparoscopic cholecystectomy with cholangiogram, possible open;  Surgeon: Lisa Guidry MD;  Location: [facility] MAIN OR;  Service: General;  Laterality: N/A;   • COLONOSCOPY N/A 10/28/2021    Procedure: COLONOSCOPY to cecum:  cold snare polyps,;  Surgeon: Dinesh Meza MD;  Location: [facility] ENDOSCOPY;  Service: Gastroenterology;  Laterality: N/A;  pre:  Iron deficiency anemia  post:  polyps, diverticulosis,    • COLONOSCOPY N/A 11/9/2021    Procedure: COLONOSCOPY to cecum with APC cautery of AVM and clip placement x1;  Surgeon: Pavan Rodriguez MD;  Location: [facility] ENDOSCOPY;  Service: Gastroenterology;  Laterality: N/A;  PRE - gi bleed, anemia  POST - diverticulosis, poor prep, AVM right colon   • ENDOSCOPY N/A 10/28/2021    Procedure: ESOPHAGOGASTRODUODENOSCOPY with biopsies;  Surgeon: Dinesh Meza MD;  Location: [facility] ENDOSCOPY;  Service: Gastroenterology;  Laterality: N/A;  pre:  Iron deficiency anemia  post:  duodenitis and gastritis   • EYE SURGERY  12/09/2020    cataract surgery    • NEPHRECTOMY     • OTHER SURGICAL HISTORY      elbow surgery   • OTHER SURGICAL HISTORY      right arm surgery   • PROSTATE SURGERY     • THORACENTESIS Left     diagnostic       PT ASSESSMENT (last 12 hours)     IRF PT Evaluation and Treatment     Row Name 04/22/22 1354          PT Time and Intention    Document Type daily treatment  -MS     Mode of Treatment physical therapy  -MS     Patient/Family/Caregiver Comments/Observations AM and PM: supine in bed with HOB  elevated, NAD, exit alarm on  -MS     Total Minutes, Physical Therapy 90  -MS     Row Name 04/22/22 1354          General Information    Existing Precautions/Restrictions fall  -MS     Limitations/Impairments safety/cognitive  -MS     Row Name 04/22/22 1354          Pain Assessment    Pretreatment Pain Rating 0/10 - no pain  -MS     Posttreatment Pain Rating 0/10 - no pain  -MS     Pre/Posttreatment Pain Comment Patient intermittently grunts during therapy sessions but denies any pain or discomfort.  -MS     Row Name 04/22/22 1354          Cognition/Psychosocial    Affect/Mental Status (Cognition) WFL  -MS     Orientation Status (Cognition) oriented x 4  -MS     Follows Commands (Cognition) follows three-step commands;repetition of directions required;verbal cues/prompting required  -MS     Personal Safety Interventions fall prevention program maintained;gait belt;nonskid shoes/slippers when out of bed  -MS     Cognitive Function safety deficit  -MS     Safety Deficit (Cognition) judgment;insight into deficits/self-awareness  -MS     Row Name 04/22/22 1354          Bed Mobility    Supine-Sit Calvin (Bed Mobility) standby assist;verbal cues  -MS     Row Name 04/22/22 1354          Transfer Assessment/Treatment    Transfers toilet transfer  -MS     Row Name 04/22/22 [facility]8          Transfers    Bed-Chair Calvin (Transfers) minimum assist (75% patient effort);verbal cues;nonverbal cues (demo/gesture)  -MS     Assistive Device (Bed-Chair Transfers) wheelchair  -MS     Sit-Stand Calvin (Transfers) moderate assist (50% patient effort)  -MS     Calvin Level (Toilet Transfer) moderate assist (50% patient effort);maximum assist (25% patient effort);verbal cues  -MS     Assistive Device (Toilet Transfer) wheelchair;grab bars/safety frame  -MS     Row Name 04/22/22 1354          Toilet Transfer    Type (Toilet Transfer) sit-stand;stand-sit;stand pivot/stand step  -MS     Comment, (Toilet Transfer)  Multiple STS transfers on commode for toileting needs, L knee blocked to prevent buckling  -MS     Row Name 04/22/22 1354          Gait/Stairs (Locomotion)    Kerrick Level (Gait) minimum assist (75% patient effort);moderate assist (50% patient effort);2 person assist;verbal cues;nonverbal cues (demo/gesture)  -MS     Assistive Device (Gait) parallel bars  -MS     Distance in Feet (Gait) 8' x 6 trials  -MS     Pattern (Gait) step-through  -MS     Deviations/Abnormal Patterns (Gait) raul decreased;left sided deviations;stride length decreased  -MS     Bilateral Gait Deviations forward flexed posture  -MS     Left Sided Gait Deviations knee buckling, left side;heel strike decreased;weight shift ability decreased  -MS     Comment, (Gait/Stairs) Posterior lean and max cues for weight shifting over to L LE, therapist blocking L knee to prevent buckling, trialed dynamic knee brace as well.  -MS     Row Name 04/22/22 1354          Balance    Static Sitting Balance supervision  -MS     Dynamic Sitting Balance standby assist  -MS     Position, Sitting Balance sitting in chair  -MS     Sit to Stand Dynamic Balance minimal assist  -MS     Static Standing Balance minimal assist  -MS     Position/Device Used, Standing Balance parallel bars  -MS     Row Name 04/22/22 1357          Hip (Therapeutic Exercise)    Hip Strengthening (Therapeutic Exercise) bilateral;sitting;marching while seated;10 repetitions;2 sets  -MS     Row Name 04/22/22 1350          Knee (Therapeutic Exercise)    Knee Isometrics (Therapeutic Exercise) bilateral;quad sets;10 repetitions;2 sets  -MS     Row Name 04/22/22 1351          Positioning and Restraints    Pre-Treatment Position sitting in chair/recliner  -MS     Post Treatment Position wheelchair  -MS     In Wheelchair sitting;call light within reach;encouraged to call for assist;exit alarm on  -MS           User Key  (r) = Recorded By, (t) = Taken By, (c) = Cosigned By    Initials Name Provider  Type    Goldie Lazar, PT Physical Therapist              Wound 03/29/22 1858 abdomen Incision (Active)   Dressing Appearance open to air 04/22/22 0837   Closure Liquid skin adhesive 04/22/22 0837   Base other (see comments) 04/22/22 0837   Drainage Amount none 04/22/22 0837   Dressing Care open to air 04/22/22 0837       Wound 04/18/22 2048 Bilateral medial gluteal MASD (Moisture associated skin damage) (Active)   Dressing Appearance open to air 04/22/22 0837   Base blanchable;red 04/22/22 0837   Periwound dry 04/22/22 0837   Care, Wound cleansed with;soap and water;barrier applied 04/22/22 0837   Dressing Care dressing changed;foam 04/22/22 0837       Wound 04/18/22 2048 Bilateral groin MASD (Moisture associated skin damage) (Active)   Dressing Appearance open to air 04/22/22 0837   Base red 04/22/22 0837   Care, Wound cleansed with;soap and water 04/22/22 0837   Dressing Care other (see comments) 04/22/22 0837       Wound 04/18/22 2048  medial coccyx Skin Tear (Active)   Dressing Appearance dry;intact 04/22/22 0837   Closure Open to air 04/22/22 0837   Base blanchable;red 04/22/22 0837   Care, Wound cleansed with;soap and water 04/22/22 0837   Dressing Care dressing applied;foam 04/22/22 0837       Wound 04/20/22 1659 Bilateral medial sacral spine Pressure Injury (Active)   Dressing Appearance intact;dry 04/22/22 0837   Base blanchable;red 04/22/22 0837   Periwound intact 04/22/22 0837   Edges irregular 04/22/22 0837   Drainage Amount none 04/22/22 0837   Care, Wound cleansed with;soap and water 04/22/22 0837   Dressing Care dressing changed;foam 04/22/22 0837   Periwound Care barrier ointment applied 04/22/22 0837     Physical Therapy Education                 Title: PT OT SLP Therapies (In Progress)     Topic: Physical Therapy (Done)     Point: Mobility training (Done)     Learning Progress Summary           Patient Acceptance, E,TB, VU by MS at 4/21/2022 1102   Significant Other Acceptance, E,TB, VU by  MS at 4/21/2022 1102                   Point: Home exercise program (Done)     Learning Progress Summary           Patient Acceptance, E,TB, VU by MS at 4/21/2022 1102   Significant Other Acceptance, E,TB, VU by MS at 4/21/2022 1102                   Point: Body mechanics (Done)     Learning Progress Summary           Patient Acceptance, E,TB, VU by MS at 4/21/2022 1102   Significant Other Acceptance, E,TB, VU by MS at 4/21/2022 1102                   Point: Precautions (Done)     Learning Progress Summary           Patient Acceptance, E,TB, VU by MS at 4/21/2022 1102   Significant Other Acceptance, E,TB, VU by MS at 4/21/2022 1102                               User Key     Initials Effective Dates Name Provider Type Discipline    MS 06/16/21 -  Goldie Abrams, PT Physical Therapist PT                PT Recommendation and Plan    Planned Therapy Interventions (PT): bed mobility training, balance training, gait training, home exercise program, postural re-education, patient/family education, ROM (range of motion), strengthening, transfer training  Frequency of Treatment (PT): 90 minutes per session  Anticipated Equipment Needs at Discharge (PT Eval): wheelchair (pending patient progress made, LRAD for R LE)                  Time Calculation:      PT Charges     Row Name 04/22/22 1406 04/22/22 1352          Time Calculation    Start Time 1230  -MS 0800  -MS     Stop Time 1300  -MS 0900  -MS     Time Calculation (min) 30 min  -MS 60 min  -MS     PT Received On -- 04/22/22  -MS     PT - Next Appointment -- 04/23/22  -MS           User Key  (r) = Recorded By, (t) = Taken By, (c) = Cosigned By    Initials Name Provider Type    MS Goldie Abrams, PT Physical Therapist                Therapy Charges for Today     Code Description Service Date Service Provider Modifiers Qty    94783940966 HC PT EVAL MOD COMPLEXITY 2 4/21/2022 Goldie Abrams, PT GP 1    15331582372 HC PT THER PROC EA 15 MIN 4/21/2022 Abrams,  Goldie LEELEE, PT GP 2    [id number]  PT THERAPEUTIC ACT EA 15 MIN 4/21/2022 Abrams, Goldie MCKOY, PT GP 1    47762886238 HC PT THER SUPP EA 15 MIN 4/21/2022 Abrams, Goldie MCKOY, PT GP 3    [id number]  PT THER PROC EA 15 MIN 4/21/2022 Abrams, Goldie MCKOY, PT GP 2    [id number]  GAIT TRAINING EA 15 MIN 4/22/2022 Abrams, Goldie MCKOY, PT GP 3    [id number]  PT THER PROC EA 15 MIN 4/22/2022 Abrams, Goldie MCKOY, PT GP 2    [id number]  PT THERAPEUTIC ACT EA 15 MIN 4/22/2022 Abrams, Goldie MCKOY, PT GP 1                   Goldie MCKOY Summers, PT  4/22/2022

## 2022-04-23 LAB
DEPRECATED RDW RBC AUTO: 50.6 FL (ref 37–54)
ERYTHROCYTE [DISTWIDTH] IN BLOOD BY AUTOMATED COUNT: 16.5 % (ref 12.3–15.4)
GLUCOSE BLDC GLUCOMTR-MCNC: 107 MG/DL (ref 70–130)
GLUCOSE BLDC GLUCOMTR-MCNC: 109 MG/DL (ref 70–130)
GLUCOSE BLDC GLUCOMTR-MCNC: 112 MG/DL (ref 70–130)
GLUCOSE BLDC GLUCOMTR-MCNC: 144 MG/DL (ref 70–130)
GLUCOSE BLDC GLUCOMTR-MCNC: 73 MG/DL (ref 70–130)
HCT VFR BLD AUTO: 31.8 % (ref 37.5–51)
HGB BLD-MCNC: 9.9 G/DL (ref 13–17.7)
INR PPP: 2.69 (ref 0.9–1.1)
MCH RBC QN AUTO: 26.2 PG (ref 26.6–33)
MCHC RBC AUTO-ENTMCNC: 31.1 G/DL (ref 31.5–35.7)
MCV RBC AUTO: 84.1 FL (ref 79–97)
PLATELET # BLD AUTO: 196 10*3/MM3 (ref 140–450)
PMV BLD AUTO: 11.3 FL (ref 6–12)
PROTHROMBIN TIME: 28.7 SECONDS (ref 11.7–14.2)
RBC # BLD AUTO: 3.78 10*6/MM3 (ref 4.14–5.8)
WBC NRBC COR # BLD: 3.95 10*3/MM3 (ref 3.4–10.8)

## 2022-04-23 PROCEDURE — 97110 THERAPEUTIC EXERCISES: CPT

## 2022-04-23 PROCEDURE — 97530 THERAPEUTIC ACTIVITIES: CPT

## 2022-04-23 PROCEDURE — 97535 SELF CARE MNGMENT TRAINING: CPT

## 2022-04-23 PROCEDURE — 85610 PROTHROMBIN TIME: CPT | Performed by: PHYSICAL MEDICINE & REHABILITATION

## 2022-04-23 PROCEDURE — 82962 GLUCOSE BLOOD TEST: CPT

## 2022-04-23 PROCEDURE — 85027 COMPLETE CBC AUTOMATED: CPT | Performed by: PHYSICAL MEDICINE & REHABILITATION

## 2022-04-23 RX ORDER — WARFARIN SODIUM 7.5 MG/1
7.5 TABLET ORAL
Status: COMPLETED | OUTPATIENT
Start: 2022-04-23 | End: 2022-04-23

## 2022-04-23 RX ADMIN — NYSTATIN 1 APPLICATION: 100000 POWDER TOPICAL at 09:32

## 2022-04-23 RX ADMIN — FAMOTIDINE 20 MG: 20 TABLET ORAL at 05:52

## 2022-04-23 RX ADMIN — WARFARIN 7.5 MG: 7.5 TABLET ORAL at 17:35

## 2022-04-23 RX ADMIN — MAGNESIUM OXIDE 400 MG (241.3 MG MAGNESIUM) TABLET 400 MG: TABLET at 22:04

## 2022-04-23 RX ADMIN — ATORVASTATIN CALCIUM 40 MG: 20 TABLET, FILM COATED ORAL at 09:30

## 2022-04-23 RX ADMIN — DIGOXIN 62.5 MCG: 125 TABLET ORAL at 12:37

## 2022-04-23 RX ADMIN — NYSTATIN: 100000 POWDER TOPICAL at 22:04

## 2022-04-23 RX ADMIN — MAGNESIUM OXIDE 400 MG (241.3 MG MAGNESIUM) TABLET 400 MG: TABLET at 09:32

## 2022-04-23 RX ADMIN — METOPROLOL SUCCINATE 12.5 MG: 25 TABLET, EXTENDED RELEASE ORAL at 09:32

## 2022-04-23 RX ADMIN — FAMOTIDINE 20 MG: 20 TABLET ORAL at 17:35

## 2022-04-23 RX ADMIN — Medication 1 CAPSULE: at 09:32

## 2022-04-23 NOTE — PLAN OF CARE
Goal Outcome Evaluation:      Slept well. Meds with applesauce. Incontinent of bladder tonight. No complaints. Glucose 159, 12 units Lantus given as ordered.

## 2022-04-23 NOTE — PROGRESS NOTES
Inpatient Rehabilitation Plan of Care Note    Plan of Care  Care Plan Reviewed - No updates at this time.    Psychosocial    Performed Intervention(s)  Provide distraction and/or relaxation as needed.  Allow extra time for patient to verbalize needs, feelings, concerns, etc.      Safety    Performed Intervention(s)  Hourly rounding.  Fall precautions: bed low and locked, patient belongings adn call light w/in  reach, nonskid socks and gait belt in use, bed and chair alarms in use.      Sphincter Control    Performed Intervention(s)  Monitor I/O.  Toileting schedule while awake.  Assist with urinal as needed, use incontinence products as needed.      Body Systems    Performed Intervention(s)  barrier cream and powder, as ordered.  Skin assessment q shift and prn  Low air loss bed (waffle overlay not available per cpd).  Turn q2h or encourage turning.    Signed by: Josephine Rees RN

## 2022-04-23 NOTE — PLAN OF CARE
Goal Outcome Evaluation:  Plan of Care Reviewed With: patient        Progress: improving  Outcome Evaluation: AAOx4. Quiet. Cooperative with all care. Up with assist of 2 d/t LLE buckling at times. Incontinent of B&B mostly. Skincare as ordered; mepilex in coccyx area; magic barrier creme applied. Dressing on abrasion to left upper back changed. HR irregular, otherwise VS stable. Turned/ encouraged to turn frequently when in bed. Low airloss mattress in place. Meds in applesauce per patient request. No sliding scale insulin needed today.

## 2022-04-23 NOTE — PROGRESS NOTES
PPS CMG Coordinator  Inpatient Rehabilitation Admission    Ethnic Group: White.  Marital Status:  Marital Status: .    IRF Admission Date:  04/20/2022  Admission Class: Initial Rehab.  Admit From:  [facility]    Pre-Hospital Living: Home. Pre-Hospital Living  With: (2) Family/Relatives.    Payment Sources: Primary: Not Listed.  Secondary: Medicare Fee for Service  Impairment Group: 16 Debility (non-cardiac, non-pulmonary)  Date of Onset of Impairment: 04/06/2022    Etiologic Diagnosis Code(s):  Rank Code      Description  1    R29.898   Other symptoms and signs involving the                 musculoskeletal system    Comorbidities:      Are there any arthritis conditions recorded for Impairment Group, Etiologic  Diagnosis, or Comorbid Conditions that meet all of the regulatory requirements  for IRF classification (in 42 .29(b)(2)(x), (xi), and xii))? No    Presence of Pressure Ulcer:  No observed/documented pressure ulcers.    MEDICAL NEEDS  Height on Admission:  72 inches.  Weight on Admission:  133 pounds.    QUALITY INDICATORS  Prior Functioning:  Self Care: Patient completed the activities by him/herself, with or without an  assistive device, with no assistance from a helper.  Indoor Mobility: Patient completed the activities by him/herself, with or  without an assistive device, with no assistance from a helper.  Stairs: Patient completed the activities by him/herself, with or without an  assistive device, with no assistance from a helper.  Functional Cognition: Patient completed the activities by him/herself, with or  without an assistive device, with no assistance from a helper.  Prior Device Use: Walker    Bladder and Bowel: Bladder Continence: Always incontinent.  Bowel Continence: Always incontinent (no episodes of continent bowel movements).    Swallowing/Nutritional Status: Regular food (solids and liquids swallowed safely  without supervision or modified food or liquid  consistency).  Special Conditions: Patient did not receive total parenteral nutrition treatment  at the time of admission.    Section I. Active Diagnosis: Comorbidities and Co-existing Conditions:  Diabetes Mellitus (DM) - e.g., diabetic retinopathy, nephropathy, and  neuropathy).  Section J. Health Conditions: Patient has not had any falls in the past year.  Patient has not had major surgery during the 100 days prior to admission.  Section M. Skin Conditions  Unhealed Pressure Ulcer/Injuries at Stage 1 or  Higher on Admission:  No.  Section N. Medication:  Potential Clinically Significant Medication Issues: No issues found during  review    Signed by: Jayro Renee RN

## 2022-04-23 NOTE — PROGRESS NOTES
Inpatient Rehabilitation Plan of Care Note    Plan of Care  Care Plan Reviewed - No updates at this time.    Psychosocial    Performed Intervention(s)  Provide distraction and/or relaxation as needed.  Allow extra time for patient to verbalize needs, feelings, concerns, etc.      Safety    Performed Intervention(s)  Hourly rounding.  Fall precautions: bed low and locked, patient belongings adn call light w/in  reach, nonskid socks and gait belt in use, bed and chair alarms in use.      Sphincter Control    Performed Intervention(s)  Monitor I/O.  Toileting schedule while awake.  Assist with urinal as needed, use incontinence products as needed.      Body Systems    Performed Intervention(s)  barrier cream and powder, as ordered.  Skin assessment q shift and prn  Low air loss bed (waffle overlay not available per cpd).  Turn q2h or encourage turning.    Signed by: Izabel Ganrett RN

## 2022-04-23 NOTE — PROGRESS NOTES
[facility] Clinical Pharmacy Services: Warfarin Dosing/Monitoring Consult    Francisco Sequeira is a 86 y.o. male, estimated creatinine clearance is 39.7 mL/min (by C-G formula based on SCr of 1.18 mg/dL). weighing 60.2 kg (132 lb 11.5 oz).    Results from last 7 days   Lab Units 04/23/22  0758 04/23/22  0732 04/22/22  1950 04/22/22  0926 04/21/22  0821 04/20/22  0710 04/19/22  0723   INR   --  2.69* 2.63* 2.52* 2.92* 2.07* 1.82*   HEMOGLOBIN g/dL 9.9*  --   --  10.4* 9.2* 9.7* 9.3*   HEMATOCRIT % 31.8*  --   --  33.9* 30.4* 30.5* 29.2*   PLATELETS 10*3/mm3 196  --   --  263 213 219 217     Prior to admission anticoagulation: warfarin 7.5 mg daily    Hospital Anticoagulation:  Consulting provider: Dr. Andrade  Start date: 4/20/22  Indication: Mechanical valve  Target INR:  3-3.5  Expected duration: tbd   Bridge Therapy: Yes  with enoxaparin    Potential food or drug interactions: none currently    Education complete?/Date: Home medication    Assessment/Plan:  INR remains subtherapeutic today at 2.69 but trending up. Will give 7.5 mg today.   Monitor for any signs or symptoms of bleeding  Follow up daily INRs and dose adjustments    Pharmacy will continue to follow until discharge or discontinuation of warfarin.     Brian Jarquin [facility]  Clinical Pharmacist

## 2022-04-23 NOTE — PROGRESS NOTES
LOS: 3 days   Patient Care Team:  Rubin Hinkle APRN as PCP - General (Family Medicine)  Audra Vazquez RODDY as Pharmacist  Vasquez Niño, PharmD as Pharmacist (Pharmacy)  Tatiana Tinoco MD as Consulting Physician (Gastroenterology)      LAURA XIE  1935    Diagnoses    1. 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  Hypertension.  Impaired mobility/impaired self-care  Chronic kidney disease stage III  Hyperglycemia-Lantus  Anemia-ferrous sulfate  Recent C. difficile colitis-completed vancomycin taper      Subjective   Patient seen and examined. No acute events overnight. Denies CP, SOA, N/V, F/C.     Objective     Vitals:    04/23/22 1228   BP: 136/58   Pulse: 72   Resp: 18   Temp: 97.8 °F (36.6 °C)   SpO2: 94%       PHYSICAL EXAM:      MENTAL STATUS -  AWAKE / ALERT  HEENT- NCAT,   SCLERAE ANICTERIC, CONJUNCTIVAE PINK, OP MOIST, NO JVD, EARS UNREMARKABLE EXTERNALLY  LUNGS - CTA, NO WHEEZES, RALES OR RHONCHI  HEART-irregular NO RUB, MURMUR, OR GALLOP  Prosthetic click  ABD - NORMOACTIVE BOWEL SOUNDS, SOFT, NT. NO HEPATOSPLENOMEGALY APPRECIATED  EXT - NO EDEMA OR CYANOSIS  NEURO -reports light touch present in the dermatomes of the bilateral lower extremities.  Proprioception accurate at the great toes bilaterally.  MOTOR EXAM - RUE/RLE 5/5. LUE 5/5.    Left hip flexion 2/5, adduction takes resistance, hip extension-active, knee extension 0/5, knee flexion 0/5, ankle dorsiflexion 4/5, EHL 4/5, ankle plantarflexion takes resistance         MEDICATIONS  Scheduled Meds:atorvastatin, 40 mg, Oral,  Daily  digoxin, 62.5 mcg, Oral, Daily  famotidine, 20 mg, Oral, BID AC  ferrous sulfate, 325 mg, Oral, Every Other Day  insulin glargine, 12 Units, Subcutaneous, Nightly  insulin lispro, 0-9 Units, Subcutaneous, TID AC  lactobacillus acidophilus, 1 capsule, Oral, Daily  magnesium oxide, 400 mg, Oral, BID  metoprolol succinate XL, 12.5 mg, Oral, Daily  nystatin, , Topical, Q12H  warfarin, 7.5 mg, Oral, Once      Continuous Infusions:Pharmacy to dose warfarin,       PRN Meds:.•  acetaminophen **OR** [DISCONTINUED] acetaminophen **OR** [DISCONTINUED] acetaminophen  •  calcium carbonate  •  dextrose  •  dextrose  •  diphenhydrAMINE  •  glucagon (human recombinant)  •  hydrocortisone-bacitracin-zinc oxide-nystatin  •  nitroglycerin  •  ondansetron **OR** ondansetron  •  Pharmacy to dose warfarin      RESULTS  Glucose   Date/Time Value Ref Range Status   04/23/2022 1134 144 (H) 70 - 130 mg/dL Final     Comment:     Meter: [id number] : [id number] Dian Gomes    04/23/2022 0704 107 70 - 130 mg/dL Final     Comment:     Meter: [id number] : [id number] [facility]   04/23/2022 0614 73 70 - 130 mg/dL Final     Comment:     Meter: [id number] : [id number] [facility]   04/22/2022 2056 159 (H) 70 - 130 mg/dL Final     Comment:     Meter: [id number] : [id number] [facility]   04/22/2022 1636 131 (H) 70 - 130 mg/dL Final     Comment:     Meter: [id number] : [id number] Emigdio Alston    04/22/2022 1127 135 (H) 70 - 130 mg/dL Final     Comment:     Meter: [id number] : [id number] Emigdio Alston    04/22/2022 0558 76 70 - 130 mg/dL Final     Comment:     Meter: [id number] : [id number] Chaparro Saunders RN   04/21/2022 2005 159 (H) 70 - 130 mg/dL Final     Comment:     Meter: [id number] : [id number] Floresita ALEJO     Results from last 7 days   Lab Units 04/23/22  0758 04/22/22  0926 04/21/22  0821   WBC 10*3/mm3 3.95 4.90 3.38*   HEMOGLOBIN g/dL 9.9* 10.4* 9.2*   HEMATOCRIT % 31.8* 33.9* 30.4*    PLATELETS 10*3/mm3 196 263 213     Results from last 7 days   Lab Units 04/22/22  0926 04/20/22  0710 04/19/22  0723   SODIUM mmol/L 135* 138 137   POTASSIUM mmol/L 4.6 4.4 4.4   CHLORIDE mmol/L 101 106 105   CO2 mmol/L 24.3 26.7 27.2   BUN mg/dL 14 16 14   CREATININE mg/dL 1.18 1.12 1.10   CALCIUM mg/dL 8.6 8.4* 8.4*   GLUCOSE mg/dL 156* 70 96       Results from last 7 days   Lab Units 04/23/22  0732 04/22/22  1950 04/22/22  0926   INR  2.69* 2.63* 2.52*         Assessment/Plan     Hematoma of left iliopsoas muscle      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  The hip weakness is probably commendation of hematoma and iliopsoas as well as nerve impingement.  He is weaker with knee extension compared to hip adduction which would point to also component of femoral nerve palsy.     Reviewed features of upper lumbosacral plexopathy greater than lower lumbosacral plexopathy with the patient and his wife, reviewed timeframe of recovery is typically measured in months and may be incomplete, and reviewed need for bracing which will be determined and see how he progresses with his rehabilitation program.  Reviewed different styles of knee orthosis including dynamic knee extension versus lockable knee unit depending on level of stability he needs.        History of mechanical heart valve-  Mechanical aortic valve replacement  Prior CVA with residual left-sided weakness (INR at 2.2)  Chronic anticoagulation with goal INR of 3 - 3.5  INR goal 3.0-3.5 secondary to previous stroke-pharmacy managing  April 21-INR 2.92.  Discontinue Lovenox.  Continue Coumadin  April 22-INR trended back down to 2.5 to.  Had been on Coumadin 7.5 mg through April 19, received 10 mg on April 20, increased INR from 2.07 to 2.92.  Given the rate of  increase  in INR, Coumadin was held yesterday.  INR trended back down to 2.52 today.  Anticipate Coumadin 10 mg dose today.  Will recheck INR this afternoon around 3:30 PM and if trend lower, will give Lovenox 70 mg subcutaneous x1 and recheck INR in the a.m.. Concern would be for re-bleed as previously bleed on ASA and Lovenox to coumadin transition when INR was 1.6. Previous stroke when INR was 2.2.     INR goal 3.0-3.5     Atrial fibrillation-digoxin/metoprolol  Nonischemic cardiomyopathy.  Hypertension.     Impaired mobility/impaired self-care     History of lower GI bleed with hematochezia in November 2021  Right rectus sheath hematoma in November 2021     Chronic kidney disease stage III  History of renal cell carcinoma and right nephrectomy      Hyperglycemia-Lantus  April 21-blood glucose low this morning and decrease Lantus     Anemia-ferrous sulfate     Recent C. difficile colitis-completed vancomycin taper     Now admit for comprehensive acute inpatient rehabilitation .  This would be an interdisciplinary program with physical therapy 1.5 hour,  occupational therapy 1.5 hour,  5 days a week.  Rehabilitation nursing for carryover, monitoring of cardiac and neurologic   status, bowel and bladder, and skin  Ongoing physician follow-up.  Weekly team conferences.  Goals are to achieve a level of contact-guard assist with  mobility and self-care and improved strength.   Rehabilitation prognosis indeterminate.  Medical prognosis indeterminate.  Estimated length of stay is approximately 2 weeks, but is only an estimation.      The patient's functional status and clinical status is unchanged from preadmission assessment and the patient continues appropriate for acute inpatient rehabilitation.  Goal is for home with outpatient   therapies.  Barrier to discharge: Impaired mobility and self-care- work on strength, transfers, balance, progressive ambulation, bracing, activities of daily living to overcome.       4/23/2022:  Reviewed medications, vital signs, and recent lab work. Progressing well. Continue comprehensive inpatient rehabilitation program.    INR 2.9. Continue to monitor.      Braydon Barfield MD      During rounds, used appropriate personal protective equipment including mask and gloves.  Additional gown if indicated.  Mask used was standard procedure mask. Appropriate PPE was worn during the entire visit.  Hand hygiene was completed before and after.

## 2022-04-23 NOTE — THERAPY TREATMENT NOTE
Inpatient Rehabilitation - Physical Therapy Treatment Note       [facility]     Patient Name: Francisco Sequeira  : 1935  MRN: [id number]    Today's Date: 2022                    Admit Date: 2022      Visit Dx:     ICD-10-CM ICD-9-CM   1. 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)   • Iron deficiency anemia   • Chronic anticoagulation   • Hemiparesis of left nondominant side as late effect of cerebral infarction (HCC)   • Rectus sheath hematoma   • Sepsis (HCC)   • Calculus of gallbladder with acute cholecystitis without obstruction   • History of Clostridium difficile infection   • Aspiration pneumonitis (HCC)   • Hematoma of iliopsoas muscle, left, initial encounter   • History of CVA (cerebrovascular accident)   • S/P laparoscopic cholecystectomy   • Anemia   • Hematoma of left iliopsoas muscle       Past Medical History:   Diagnosis Date   • Allergic rhinitis    • Aortic valve insufficiency    • Ascending aortic aneurysm (HCC)    • Atrial fibrillation (HCC)    • Bacteremia    • Calcific aortic stenosis of bicuspid valve    • Cardiac arrest (HCC)    • Cardiomyopathy (HCC)    • CKD (chronic kidney disease) stage 3, GFR 30-59 ml/min (HCC)    • Contact dermatitis due to poison ivy    • Elbow fracture    • Esophageal reflux    • GERD (gastroesophageal reflux disease)    • Head injury    • History of transfusion    • Hyperglycemia    • Hyperlipidemia    • Hypertension    • Kidney carcinoma (HCC)    •  Nonischemic cardiomyopathy (HCC)    • Renal oncocytoma    • Seasonal allergic reaction    • Sinusitis    • Syncope    • Type 2 diabetes mellitus (HCC)     uncontrolled   • Visual field defect        Past Surgical History:   Procedure Laterality Date   • AORTIC VALVE REPAIR/REPLACEMENT     • ASCENDING AORTIC ANEURYSM REPAIR W/ MECHANICAL AORTIC VALVE REPLACEMENT     • CHOLECYSTECTOMY WITH INTRAOPERATIVE CHOLANGIOGRAM N/A 3/29/2022    Procedure: Laparoscopic cholecystectomy with cholangiogram, possible open;  Surgeon: Lisa Guidry MD;  Location: [facility] MAIN OR;  Service: General;  Laterality: N/A;   • COLONOSCOPY N/A 10/28/2021    Procedure: COLONOSCOPY to cecum:  cold snare polyps,;  Surgeon: Dinesh Meza MD;  Location: [facility] ENDOSCOPY;  Service: Gastroenterology;  Laterality: N/A;  pre:  Iron deficiency anemia  post:  polyps, diverticulosis,    • COLONOSCOPY N/A 11/9/2021    Procedure: COLONOSCOPY to cecum with APC cautery of AVM and clip placement x1;  Surgeon: Pavan Rodriguez MD;  Location: [facility] ENDOSCOPY;  Service: Gastroenterology;  Laterality: N/A;  PRE - gi bleed, anemia  POST - diverticulosis, poor prep, AVM right colon   • ENDOSCOPY N/A 10/28/2021    Procedure: ESOPHAGOGASTRODUODENOSCOPY with biopsies;  Surgeon: Dinesh Meza MD;  Location: [facility] ENDOSCOPY;  Service: Gastroenterology;  Laterality: N/A;  pre:  Iron deficiency anemia  post:  duodenitis and gastritis   • EYE SURGERY  12/09/2020    cataract surgery    • NEPHRECTOMY     • OTHER SURGICAL HISTORY      elbow surgery   • OTHER SURGICAL HISTORY      right arm surgery   • PROSTATE SURGERY     • THORACENTESIS Left     diagnostic       PT ASSESSMENT (last 12 hours)     IRF PT Evaluation and Treatment     Row Name 04/23/22 1116          PT Time and Intention    Document Type daily treatment  -     Mode of Treatment physical therapy  -     Patient/Family/Caregiver Comments/Observations pt supine in bed no acute distress   -     Row Name 04/23/22 1116          Pain Assessment    Pretreatment Pain Rating 0/10 - no pain  -     Posttreatment Pain Rating 0/10 - no pain  -[facility] Name 04/23/22 1116          Cognition/Psychosocial    Affect/Mental Status (Cognition) WNL  -     Orientation Status (Cognition) oriented x 4  -     Personal Safety Interventions fall prevention program maintained;gait belt;supervised activity;nonskid shoes/slippers when out of bed  -     Row Name 04/23/22 1116          Bed Mobility    Supine-Sit Adams (Bed Mobility) contact guard;verbal cues;nonverbal cues (demo/gesture)  -     Assistive Device (Bed Mobility) bed rails  -     Row Name 04/23/22 1116          Transfers    Bed-Chair Adams (Transfers) moderate assist (50% patient effort);nonverbal cues (demo/gesture);verbal cues;set up  -     Assistive Device (Bed-Chair Transfers) wheelchair  -     Sit-Stand Adams (Transfers) minimum assist (75% patient effort);moderate assist (50% patient effort);verbal cues;nonverbal cues (demo/gesture)  -     Stand-Sit Adams (Transfers) minimum assist (75% patient effort);moderate assist (50% patient effort);verbal cues;nonverbal cues (demo/gesture)  -     Row Name 04/23/22 1116          Bed-Chair Transfer    Comment, (Bed-Chair Transfer) squat pivot  -[facility] Name 04/23/22 1116          Sit-Stand Transfer    Assistive Device (Sit-Stand Transfers) walker, front-wheeled  -[facility] Name 04/23/22 1116          Stand-Sit Transfer    Assistive Device (Stand-Sit Transfers) walker, front-wheeled  -[facility] Name 04/23/22 1116          Gait/Stairs (Locomotion)    Adams Level (Gait) minimum assist (75% patient effort);2 person assist;verbal cues;nonverbal cues (demo/gesture);moderate assist (50% patient effort)  2nd person following w WC  -     Assistive Device (Gait) walker, front-wheeled  [facility]     Distance in Feet (Gait) 14  -     Pattern (Gait) step-to  -      Deviations/Abnormal Patterns (Gait) raul decreased;left sided deviations;stride length decreased  -     Bilateral Gait Deviations forward flexed posture  -     Left Sided Gait Deviations knee buckling, left side;heel strike decreased;weight shift ability decreased  PT blocking/guarding L knee to prevent buckling, pt had 1 episode of strong buckling requiring Mod A to recover  -     Comment, (Gait/Stairs) also, 6ft x2 parallel bars min/mod A  x2  -     Row Name 04/23/22 1116          Motor Skills    Therapeutic Exercise ankle  -     Row Name 04/23/22 [facility]          Hip (Therapeutic Exercise)    Hip Strengthening (Therapeutic Exercise) left;aBduction;aDduction;supine;20 repititions  -     Row Name 04/23/22 1116          Knee (Therapeutic Exercise)    Knee (Therapeutic Exercise) AAROM (active assistive range of motion);PROM (passive range of motion)  -     Knee AAROM (Therapeutic Exercise) left;flexion;sitting;10 repetitions;5 repetitions  pillow case assist  -     Knee PROM (Therapeutic Exercise) left;supine;flexion;extension;10 repetitions;5 repetitions  -     Knee Isometrics (Therapeutic Exercise) bilateral;gluteal sets;quad sets;supine;10 repetitions;5 repetitions  -     Knee Strengthening (Therapeutic Exercise) left;SAQ (short arc quad);supine;15 repititions  assisted  -     Row Name 04/23/22 1116          Ankle (Therapeutic Exercise)    Ankle (Therapeutic Exercise) AROM (active range of motion)  -     Ankle AROM (Therapeutic Exercise) bilateral;dorsiflexion;plantarflexion;15 repititions;sitting  -     Row Name 04/23/22 1116          Positioning and Restraints    Pre-Treatment Position in bed  -     Post Treatment Position wheelchair  -     In Wheelchair call light within reach;sitting;encouraged to call for assist;exit alarm on;with family/caregiver  -           User Key  (r) = Recorded By, (t) = Taken By, (c) = Cosigned By    Initials Name Provider Type     Sandi Shepard, PT  Physical Therapist              Wound 03/29/22 1858 abdomen Incision (Active)   Dressing Appearance open to air 04/22/22 2151   Closure Liquid skin adhesive 04/22/22 2151   Drainage Amount none 04/22/22 2151       Wound 04/18/22 2048 Bilateral medial gluteal MASD (Moisture associated skin damage) (Active)   Dressing Appearance open to air 04/22/22 2151       Wound 04/18/22 2048 Bilateral groin MASD (Moisture associated skin damage) (Active)   Dressing Appearance open to air 04/22/22 2151       Wound 04/20/22 1659 Bilateral medial sacral spine Pressure Injury (Active)   Dressing Appearance dry;intact 04/22/22 2151   Drainage Amount none 04/22/22 2151   Dressing Care foam 04/22/22 2151     Physical Therapy Education                 Title: PT OT SLP Therapies (In Progress)     Topic: Physical Therapy (Done)     Point: Mobility training (Done)     Learning Progress Summary           Patient Acceptance, E, NR,VU,DU by  at 4/23/2022 1130    Acceptance, E,TB, VU by MS at 4/21/2022 1102   Significant Other Acceptance, E,TB, VU by MS at 4/21/2022 1102                   Point: Home exercise program (Done)     Learning Progress Summary           Patient Acceptance, E, NR,VU,DU by  at 4/23/2022 1130    Acceptance, E,TB, VU by MS at 4/21/2022 1102   Significant Other Acceptance, E,TB, VU by MS at 4/21/2022 1102                   Point: Body mechanics (Done)     Learning Progress Summary           Patient Acceptance, E, NR,VU,DU by  at 4/23/2022 1130    Acceptance, E,TB, VU by MS at 4/21/2022 1102   Significant Other Acceptance, E,TB, VU by MS at 4/21/2022 1102                   Point: Precautions (Done)     Learning Progress Summary           Patient Acceptance, E, NR,VU,DU by  at 4/23/2022 1130    Acceptance, E,TB, VU by MS at 4/21/2022 1102   Significant Other Acceptance, E,TB, VU by MS at 4/21/2022 1102                               User Key     Initials Effective Dates Name Provider Type Discipline     06/16/21 -   Sandi Shepard, PT Physical Therapist PT    MS 06/16/21 -  Goldie Abrams PT Physical Therapist PT                PT Recommendation and Plan                          Time Calculation:      PT Charges     Row Name 04/23/22 1130 04/23/22 1129          Time Calculation    Start Time 1100  -LH 0900  -     Stop Time 1130  -LH 1000  -     Time Calculation (min) 30 min  - 60 min  -     PT Received On -- 04/23/22  -     PT - Next Appointment -- 04/25/22  -           User Key  (r) = Recorded By, (t) = Taken By, (c) = Cosigned By    Initials Name Provider Type     Sandi Shepard, PT Physical Therapist                Therapy Charges for Today     Code Description Service Date Service Provider Modifiers Qty    83501908576  PT THER PROC EA 15 MIN 4/23/2022 Sandi Shepard, PT GP 3    [id number]  PT THERAPEUTIC ACT EA 15 MIN 4/23/2022 Sandi Shepard, PT GP 3        .Patient was wearing a face mask during this therapy encounter. Therapist used appropriate personal protective equipment including eye protection, mask, and gloves.  Mask used was standard procedure mask. Appropriate PPE was worn during the entire therapy session. Hand hygiene was completed before and after therapy session. Patient is not in enhanced droplet precautions.              Sandi Shepard PT  4/23/2022

## 2022-04-23 NOTE — THERAPY TREATMENT NOTE
Inpatient Rehabilitation - Occupational Therapy Treatment Note    Psychiatric     Patient Name: Francisco Sequeira  : 1935  MRN: [id number]    Today's Date: 2022                 Admit Date: 2022         ICD-10-CM ICD-9-CM   1. 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)   • Iron deficiency anemia   • Chronic anticoagulation   • Hemiparesis of left nondominant side as late effect of cerebral infarction (HCC)   • Rectus sheath hematoma   • Sepsis (HCC)   • Calculus of gallbladder with acute cholecystitis without obstruction   • History of Clostridium difficile infection   • Aspiration pneumonitis (HCC)   • Hematoma of iliopsoas muscle, left, initial encounter   • History of CVA (cerebrovascular accident)   • S/P laparoscopic cholecystectomy   • Anemia   • Hematoma of left iliopsoas muscle       Past Medical History:   Diagnosis Date   • Allergic rhinitis    • Aortic valve insufficiency    • Ascending aortic aneurysm (HCC)    • Atrial fibrillation (HCC)    • Bacteremia    • Calcific aortic stenosis of bicuspid valve    • Cardiac arrest (HCC)    • Cardiomyopathy (HCC)    • CKD (chronic kidney disease) stage 3, GFR 30-59 ml/min (HCC)    • Contact dermatitis due to poison ivy    • Elbow fracture    • Esophageal reflux    • GERD (gastroesophageal reflux disease)    • Head injury    • History of transfusion    • Hyperglycemia    • Hyperlipidemia    • Hypertension    • Kidney carcinoma (HCC)    • Nonischemic  cardiomyopathy (HCC)    • Renal oncocytoma    • Seasonal allergic reaction    • Sinusitis    • Syncope    • Type 2 diabetes mellitus (HCC)     uncontrolled   • Visual field defect        Past Surgical History:   Procedure Laterality Date   • AORTIC VALVE REPAIR/REPLACEMENT     • ASCENDING AORTIC ANEURYSM REPAIR W/ MECHANICAL AORTIC VALVE REPLACEMENT     • CHOLECYSTECTOMY WITH INTRAOPERATIVE CHOLANGIOGRAM N/A 3/29/2022    Procedure: Laparoscopic cholecystectomy with cholangiogram, possible open;  Surgeon: Lisa Guidry MD;  Location: [facility] MAIN OR;  Service: General;  Laterality: N/A;   • COLONOSCOPY N/A 10/28/2021    Procedure: COLONOSCOPY to cecum:  cold snare polyps,;  Surgeon: Dinesh Meza MD;  [facility];  Service: Gastroenterology;  Laterality: N/A;  pre:  Iron deficiency anemia  post:  polyps, diverticulosis,    • COLONOSCOPY N/A 11/9/2021    Procedure: COLONOSCOPY to cecum with APC cautery of AVM and clip placement x1;  Surgeon: Pavan Rodriguez MD;  [facility];  Service: Gastroenterology;  Laterality: N/A;  PRE - gi bleed, anemia  POST - diverticulosis, poor prep, AVM right colon   • ENDOSCOPY N/A 10/28/2021    Procedure: ESOPHAGOGASTRODUODENOSCOPY with biopsies;  Surgeon: Dinesh Meza MD;  Location: [facility] ENDOSCOPY;  Service: Gastroenterology;  Laterality: N/A;  pre:  Iron deficiency anemia  post:  duodenitis and gastritis   • EYE SURGERY  12/09/2020    cataract surgery    • NEPHRECTOMY     • OTHER SURGICAL HISTORY      elbow surgery   • OTHER SURGICAL HISTORY      right arm surgery   • PROSTATE SURGERY     • THORACENTESIS Left     diagnostic             IRF OT ASSESSMENT FLOWSHEET (last 12 hours)     IRF OT Evaluation and Treatment     Row Name 04/23/22 1355          OT Time and Intention    Document Type daily treatment  -JW     Mode of Treatment occupational therapy  -JW     Patient Effort good  -JW     Symptoms Noted During/After Treatment  fatigue  fatigue during PM session. Req'ed increased assist for TFs in PM  -     Row Name 04/23/22 1355          General Information    General Observations of Patient Pt was found sitting in WC, pleasant and agreeable to OT. PM: Pt was found resting in bed, agreeable to OT  -     Existing Precautions/Restrictions fall  -     Limitations/Impairments safety/cognitive  -     Row Name 04/23/22 1355          Cognition/Psychosocial    Affect/Mental Status (Cognition) WNL  -     Orientation Status (Cognition) oriented x 3  -     Personal Safety Interventions fall prevention program maintained;gait belt;nonskid shoes/slippers when out of bed;supervised activity  -     Cognitive Function safety deficit  -     Safety Deficit (Cognition) insight into deficits/self-awareness  -     Row Name 04/23/22 1358          Grooming    [place]arks Level (Grooming) grooming skills;supervision  -     Position (Grooming) sink side;supported sitting  -     Row Name 04/23/22 1351          Transfers    Bed-Chair [place] (Transfers) moderate assist (50% patient effort);verbal cues  -     Assistive Device (Bed-Chair Transfers) wheelchair  -     Sit-Stand [place] (Transfers) minimum assist (75% patient effort);moderate assist (50% patient effort);verbal cues  -     Row Name 04/23/22 1353          Bed-Chair Transfer    Comment, (Bed-Chair Transfer) squat pivot bed<>WC  -     Row Name 04/23/22 135          Sit-Stand Transfer    Assistive Device (Sit-Stand Transfers) wheelchair  -     Comment, (Sit-Stand Transfer) L knee guarded close to anticipate for buckling of knee  -[facility] Name 04/23/22 1350          Motor Skills    Therapeutic Exercise aerobic  -     Row Name 04/23/22 1350          Shoulder (Therapeutic Exercise)    Shoulder (Therapeutic Exercise) strengthening exercise  -     Shoulder Strengthening (Therapeutic Exercise) 4 lb free weight;bilateral;flexion;extension;scapular  stabilization;sitting;10 repetitions;3 sets  -     Row Name 04/23/22 1355          Elbow/Forearm (Therapeutic Exercise)    Elbow/Forearm (Therapeutic Exercise) strengthening exercise  -     Elbow/Forearm Strengthening (Therapeutic Exercise) 4 lb free weight;bilateral;flexion;extension;sitting;10 repetitions;3 sets  -     Row Name 04/23/22 1355          Aerobic Exercise    Type (Aerobic Exercise) arm bike;for 3 minutes  -     Comment, Aerobic Exercise (Therapeutic Exercise) 1 rest break  -     Row Name 04/23/22 1355          Balance    Dynamic Sitting Balance standby assist  pt participates in dyn reaching task at tabletop to recreate designs with PVC pipe in both sitting and standing positions  -     Static Standing Balance minimal assist  L knee closely guarded to anticipate buckling  -     Balance Interventions standing;supported;static;moderate challenge;dynamic reaching  -     Row Name 04/23/22 1355          Positioning and Restraints    Pre-Treatment Position in bed  -     Post Treatment Position bed  -     In Bed fowlers;call light within reach;encouraged to call for assist;exit alarm on;with family/caregiver  -           User Key  (r) = Recorded By, (t) = Taken By, (c) = Cosigned By    Initials Name Effective Dates     Sujey Herron OT 06/10/21 -                  Occupational Therapy Education                 Title: PT OT SLP Therapies (In Progress)     Topic: Occupational Therapy (Not Started)     Point: ADL training (Not Started)     Description:   Instruct learner(s) on proper safety adaptation and remediation techniques during self care or transfers.   Instruct in proper use of assistive devices.              Learner Progress:  Not documented in this visit.          Point: Home exercise program (Not Started)     Description:   Instruct learner(s) on appropriate technique for monitoring, assisting and/or progressing therapeutic exercises/activities.              Learner Progress:   Not documented in this visit.          Point: Precautions (Not Started)     Description:   Instruct learner(s) on prescribed precautions during self-care and functional transfers.              Learner Progress:  Not documented in this visit.          Point: Body mechanics (Not Started)     Description:   Instruct learner(s) on proper positioning and spine alignment during self-care, functional mobility activities and/or exercises.              Learner Progress:  Not documented in this visit.                                OT Recommendation and Plan                         Time Calculation:      Time Calculation- OT     Row Name 04/23/22 1405 04/23/22 1404          Time Calculation- OT    OT Start Time 1300  - 1000  -     OT Stop Time 1330  -JW 1100  -     OT Time Calculation (min) 30 min  - 60 min  -           User Key  (r) = Recorded By, (t) = Taken By, (c) = Cosigned By    Initials Name Provider Type    Sujey Starkey OT Occupational Therapist              Therapy Charges for Today     Code Description Service Date Service Provider Modifiers Qty    36286898473 HC OT SELF CARE/MGMT/TRAIN EA 15 MIN 4/23/2022 Sujey Herron OT GO 1    95091328576 HC OT THER PROC EA 15 MIN 4/23/2022 Sujey Herron OT GO 3    97283944852 HC OT THERAPEUTIC ACT EA 15 MIN 4/23/2022 Sujey Herron OT GO 2                   Sujey Herron OT  4/23/2022

## 2022-04-24 LAB
DEPRECATED RDW RBC AUTO: 49.7 FL (ref 37–54)
ERYTHROCYTE [DISTWIDTH] IN BLOOD BY AUTOMATED COUNT: 15.7 % (ref 12.3–15.4)
GLUCOSE BLDC GLUCOMTR-MCNC: 108 MG/DL (ref 70–130)
GLUCOSE BLDC GLUCOMTR-MCNC: 130 MG/DL (ref 70–130)
GLUCOSE BLDC GLUCOMTR-MCNC: 216 MG/DL (ref 70–130)
GLUCOSE BLDC GLUCOMTR-MCNC: 82 MG/DL (ref 70–130)
HCT VFR BLD AUTO: 30.8 % (ref 37.5–51)
HGB BLD-MCNC: 9.2 G/DL (ref 13–17.7)
INR PPP: 3.27 (ref 0.9–1.1)
MCH RBC QN AUTO: 25.8 PG (ref 26.6–33)
MCHC RBC AUTO-ENTMCNC: 29.9 G/DL (ref 31.5–35.7)
MCV RBC AUTO: 86.3 FL (ref 79–97)
PLATELET # BLD AUTO: 208 10*3/MM3 (ref 140–450)
PMV BLD AUTO: 11.2 FL (ref 6–12)
PROTHROMBIN TIME: 33.6 SECONDS (ref 11.7–14.2)
RBC # BLD AUTO: 3.57 10*6/MM3 (ref 4.14–5.8)
WBC NRBC COR # BLD: 4.6 10*3/MM3 (ref 3.4–10.8)

## 2022-04-24 PROCEDURE — 85610 PROTHROMBIN TIME: CPT | Performed by: PHYSICAL MEDICINE & REHABILITATION

## 2022-04-24 PROCEDURE — 85027 COMPLETE CBC AUTOMATED: CPT | Performed by: PHYSICAL MEDICINE & REHABILITATION

## 2022-04-24 PROCEDURE — 63710000001 INSULIN LISPRO (HUMAN) PER 5 UNITS: Performed by: PHYSICAL MEDICINE & REHABILITATION

## 2022-04-24 PROCEDURE — 63710000001 DIPHENHYDRAMINE PER 50 MG: Performed by: PHYSICAL MEDICINE & REHABILITATION

## 2022-04-24 PROCEDURE — 82962 GLUCOSE BLOOD TEST: CPT

## 2022-04-24 RX ORDER — WARFARIN SODIUM 6 MG/1
6 TABLET ORAL
Status: COMPLETED | OUTPATIENT
Start: 2022-04-24 | End: 2022-04-24

## 2022-04-24 RX ADMIN — MAGNESIUM OXIDE 400 MG (241.3 MG MAGNESIUM) TABLET 400 MG: TABLET at 08:28

## 2022-04-24 RX ADMIN — ATORVASTATIN CALCIUM 40 MG: 20 TABLET, FILM COATED ORAL at 08:28

## 2022-04-24 RX ADMIN — FAMOTIDINE 20 MG: 20 TABLET ORAL at 06:02

## 2022-04-24 RX ADMIN — DIPHENHYDRAMINE HYDROCHLORIDE 25 MG: 25 CAPSULE ORAL at 11:46

## 2022-04-24 RX ADMIN — Medication 1 CAPSULE: at 08:28

## 2022-04-24 RX ADMIN — DIGOXIN 62.5 MCG: 125 TABLET ORAL at 11:43

## 2022-04-24 RX ADMIN — INSULIN LISPRO 2 UNITS: 100 INJECTION, SOLUTION INTRAVENOUS; SUBCUTANEOUS at 11:41

## 2022-04-24 RX ADMIN — WARFARIN 6 MG: 6 TABLET ORAL at 17:12

## 2022-04-24 RX ADMIN — FAMOTIDINE 20 MG: 20 TABLET ORAL at 17:12

## 2022-04-24 RX ADMIN — MAGNESIUM OXIDE 400 MG (241.3 MG MAGNESIUM) TABLET 400 MG: TABLET at 21:47

## 2022-04-24 RX ADMIN — FERROUS SULFATE TAB 325 MG (65 MG ELEMENTAL FE) 325 MG: 325 (65 FE) TAB at 08:28

## 2022-04-24 RX ADMIN — MUPIROCIN 1 APPLICATION: 20 OINTMENT TOPICAL at 15:59

## 2022-04-24 RX ADMIN — METOPROLOL SUCCINATE 12.5 MG: 25 TABLET, EXTENDED RELEASE ORAL at 08:28

## 2022-04-24 RX ADMIN — NYSTATIN: 100000 POWDER TOPICAL at 08:29

## 2022-04-24 NOTE — PLAN OF CARE
Goal Outcome Evaluation:      Slept fairly well. Meds with applesauce. Incontinent of bladder tonight. No complaints. Glucose 112, refused 12 units Lantus, was afraid his glucose would drop to low in am. Noted left for MD.

## 2022-04-24 NOTE — PROGRESS NOTES
Inpatient Rehabilitation Plan of Care Note    Plan of Care  Care Plan Reviewed - No updates at this time.    Psychosocial    Performed Intervention(s)  Provide distraction and/or relaxation as needed.  Allow extra time for patient to verbalize needs, feelings, concerns, etc.      Safety    Performed Intervention(s)  Hourly rounding.  Fall precautions: bed low and locked, patient belongings adn call light w/in  reach, nonskid socks and gait belt in use, bed and chair alarms in use.      Sphincter Control    Performed Intervention(s)  Monitor I/O.  Toileting schedule while awake.  Assist with urinal as needed, use incontinence products as needed.      Body Systems    Performed Intervention(s)  barrier cream and powder, as ordered.  Skin assessment q shift and prn  Low air loss bed (waffle overlay not available per cpd).  Turn q2h or encourage turning.    Signed by: Izabel Garnett RN

## 2022-04-24 NOTE — PROGRESS NOTES
LOS: 4 days   Patient Care Team:  Rubin Hinkle APRN as PCP - General (Family Medicine)  Audra Vazquez RODDY as Pharmacist  Vasquez Niño, PharmD as Pharmacist (Pharmacy)  Tatiana Tinoco MD as Consulting Physician (Gastroenterology)      LAURA XIE  1935    Diagnoses    1. 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  Hypertension.  Impaired mobility/impaired self-care  Chronic kidney disease stage III  Hyperglycemia-Lantus  Anemia-ferrous sulfate  Recent C. difficile colitis-completed vancomycin taper      Subjective   Patient seen and examined. No acute events overnight. Denies CP, SOA, N/V, F/C.     Blood sugar 112 last HS, patient declined Lantus. Blood sugars  otherwise on 4/23; 159 at HS 4/22.    Wife notes was using Bactroban oint to Left shoulder abrasion at home.     INR 3.27.    Objective     Vitals:    04/24/22 1148   BP: 112/58   Pulse: 80   Resp: 20   Temp: 97.8 °F (36.6 °C)   SpO2: 100%       PHYSICAL EXAM:      MENTAL STATUS -  AWAKE / ALERT  HEENT- NCAT,   SCLERAE ANICTERIC, CONJUNCTIVAE PINK, OP MOIST, NO JVD, EARS UNREMARKABLE EXTERNALLY  LUNGS - CTA, NO WHEEZES, RALES OR RHONCHI  HEART-irregular NO RUB, MURMUR, OR GALLOP  Prosthetic click  ABD - NORMOACTIVE BOWEL SOUNDS, SOFT, NT. NO HEPATOSPLENOMEGALY APPRECIATED  EXT - NO EDEMA OR CYANOSIS  NEURO -reports light touch present in the dermatomes of the bilateral lower extremities.  Proprioception accurate at the great toes bilaterally.  MOTOR EXAM - RUE/RLE 5/5. LUE 5/5.    Left hip flexion 2/5, adduction takes resistance,  hip extension-active, knee extension 0/5, knee flexion 0/5, ankle dorsiflexion 4/5, EHL 4/5, ankle plantarflexion takes resistance         MEDICATIONS  Scheduled Meds:atorvastatin, 40 mg, Oral, Daily  digoxin, 62.5 mcg, Oral, Daily  famotidine, 20 mg, Oral, BID AC  ferrous sulfate, 325 mg, Oral, Every Other Day  insulin glargine, 10 Units, Subcutaneous, Nightly  insulin lispro, 0-9 Units, Subcutaneous, TID AC  lactobacillus acidophilus, 1 capsule, Oral, Daily  magnesium oxide, 400 mg, Oral, BID  metoprolol succinate XL, 12.5 mg, Oral, Daily  mupirocin, 1 application, Topical, Q8H  nystatin, , Topical, Q12H  warfarin, 6 mg, Oral, Once      Continuous Infusions:Pharmacy to dose warfarin,       PRN Meds:.•  acetaminophen **OR** [DISCONTINUED] acetaminophen **OR** [DISCONTINUED] acetaminophen  •  calcium carbonate  •  dextrose  •  dextrose  •  diphenhydrAMINE  •  glucagon (human recombinant)  •  hydrocortisone-bacitracin-zinc oxide-nystatin  •  nitroglycerin  •  ondansetron **OR** ondansetron  •  Pharmacy to dose warfarin      RESULTS  Glucose   Date/Time Value Ref Range Status   04/24/2022 1105 216 (H) 70 - 130 mg/dL Final     Comment:     Meter: [id number] : [id number] Miguel Airena Martina NA   04/24/2022 0603 82 70 - 130 mg/dL Final     Comment:     Meter: [id number] : [id number] Mihai Humphrey NA   04/23/2022 2055 112 70 - 130 mg/dL Final     Comment:     Meter: [id number] : [id number] Kole AMIN RN   04/23/2022 1619 109 70 - 130 mg/dL Final     Comment:     Meter: [id number] : [id number] Dian Mireya NA   04/23/2022 1134 144 (H) 70 - 130 mg/dL Final     Comment:     Meter: [id number] : [id number] Dian Mireya NA   04/23/2022 0704 107 70 - 130 mg/dL Final     Comment:     Meter: [id number] : [id number] Markos Washington NA   04/23/2022 0614 73 70 - 130 mg/dL Final     Comment:     Meter: [id number] : [id number] Markos ALEJO   04/22/2022 2056 159 (H) 70 - 130 mg/dL Final     Comment:      Meter: [id number] : [id number] Markos ALEJO     Results from last 7 days   Lab Units 04/24/22  0433 04/23/22  0758 04/22/22  0926   WBC 10*3/mm3 4.60 3.95 4.90   HEMOGLOBIN g/dL 9.2* 9.9* 10.4*   HEMATOCRIT % 30.8* 31.8* 33.9*   PLATELETS 10*3/mm3 208 196 263     Results from last 7 days   Lab Units 04/22/22  0926 04/20/22  0710 04/19/22  0723   SODIUM mmol/L 135* 138 137   POTASSIUM mmol/L 4.6 4.4 4.4   CHLORIDE mmol/L 101 106 105   CO2 mmol/L 24.3 26.7 27.2   BUN mg/dL 14 16 14   CREATININE mg/dL 1.18 1.12 1.10   CALCIUM mg/dL 8.6 8.4* 8.4*   GLUCOSE mg/dL 156* 70 96       Results from last 7 days   Lab Units 04/24/22  0433 04/23/22  0732 04/22/22  1950   INR  3.27* 2.69* 2.63*         Assessment/Plan     Hematoma of left iliopsoas muscle      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  The hip weakness is probably commendation of hematoma and iliopsoas as well as nerve impingement.  He is weaker with knee extension compared to hip adduction which would point to also component of femoral nerve palsy.     Reviewed features of upper lumbosacral plexopathy greater than lower lumbosacral plexopathy with the patient and his wife, reviewed timeframe of recovery is typically measured in months and may be incomplete, and reviewed need for bracing which will be determined and see how he progresses with his rehabilitation program.  Reviewed different styles of knee orthosis including dynamic knee extension versus lockable knee unit depending on level of stability he needs.        History of mechanical heart valve-  Mechanical aortic valve replacement  Prior CVA with residual left-sided weakness (INR at 2.2)  Chronic anticoagulation with goal INR of 3 - 3.5  INR goal 3.0-3.5 secondary to previous stroke-pharmacy  managing  April 21-INR 2.92.  Discontinue Lovenox.  Continue Coumadin  April 22-INR trended back down to 2.5 to.  Had been on Coumadin 7.5 mg through April 19, received 10 mg on April 20, increased INR from 2.07 to 2.92.  Given the rate of  increase in INR, Coumadin was held yesterday.  INR trended back down to 2.52 today.  Anticipate Coumadin 10 mg dose today.  Will recheck INR this afternoon around 3:30 PM and if trend lower, will give Lovenox 70 mg subcutaneous x1 and recheck INR in the a.m.. Concern would be for re-bleed as previously bleed on ASA and Lovenox to coumadin transition when INR was 1.6. Previous stroke when INR was 2.2.     INR goal 3.0-3.5     Atrial fibrillation-digoxin/metoprolol  Nonischemic cardiomyopathy.  Hypertension.     Impaired mobility/impaired self-care     History of lower GI bleed with hematochezia in November 2021  Right rectus sheath hematoma in November 2021     Chronic kidney disease stage III  History of renal cell carcinoma and right nephrectomy      Hyperglycemia-Lantus  April 21-blood glucose low this morning and decrease Lantus     Anemia-ferrous sulfate     Recent C. difficile colitis-completed vancomycin taper     Now admit for comprehensive acute inpatient rehabilitation .  This would be an interdisciplinary program with physical therapy 1.5 hour,  occupational therapy 1.5 hour,  5 days a week.  Rehabilitation nursing for carryover, monitoring of cardiac and neurologic   status, bowel and bladder, and skin  Ongoing physician follow-up.  Weekly team conferences.  Goals are to achieve a level of contact-guard assist with  mobility and self-care and improved strength.   Rehabilitation prognosis indeterminate.  Medical prognosis indeterminate.  Estimated length of stay is approximately 2 weeks, but is only an estimation.      The patient's functional status and clinical status is unchanged from preadmission assessment and the patient continues appropriate for acute inpatient  rehabilitation.  Goal is for home with outpatient   therapies.  Barrier to discharge: Impaired mobility and self-care- work on strength, transfers, balance, progressive ambulation, bracing, activities of daily living to overcome.      4/24/2022:  Reviewed medications, vital signs, and recent lab work. Progressing well. Continue comprehensive inpatient rehabilitation program.    Pharmacy dosing Coumadin. Bactroban ointment ordered for Left shoulder abrasion, Decrease Lantus to 10U at HS.       Braydon Barfield MD      During rounds, used appropriate personal protective equipment including mask and gloves.  Additional gown if indicated.  Mask used was standard procedure mask. Appropriate PPE was worn during the entire visit.  Hand hygiene was completed before and after.

## 2022-04-24 NOTE — PROGRESS NOTES
[facility] Clinical Pharmacy Services: Warfarin Dosing/Monitoring Consult    Francisco Sequeira is a 86 y.o. male, estimated creatinine clearance is 39.7 mL/min (by C-G formula based on SCr of 1.18 mg/dL). weighing 60.2 kg (132 lb 11.5 oz).    Results from last 7 days   Lab Units 04/24/22  0433 04/23/22  0758 04/23/22  0732 04/22/22  1950 04/22/22  0926 04/21/22  0821 04/20/22  0710   INR  3.27*  --  2.69* 2.63* 2.52* 2.92* 2.07*   HEMOGLOBIN g/dL 9.2* 9.9*  --   --  10.4* 9.2* 9.7*   HEMATOCRIT % 30.8* 31.8*  --   --  33.9* 30.4* 30.5*   PLATELETS 10*3/mm3 208 196  --   --  263 213 219     Prior to admission anticoagulation: warfarin 7.5 mg daily    Hospital Anticoagulation:  Consulting provider: Dr. Andrade  Start date: 4/20/22  Indication: Mechanical valve  Target INR:  3-3.5  Expected duration: tbd   Bridge Therapy: Yes  with enoxaparin    Potential food or drug interactions: none currently    Education complete?/Date: Home medication    Assessment/Plan:  INR therapeutic at 3.27 but quite significant jump from yesterday. Will back down dose a little today. Will give 6 mg today.   Monitor for any signs or symptoms of bleeding  Follow up daily INRs and dose adjustments    Pharmacy will continue to follow until discharge or discontinuation of warfarin.     Brian Jarquin [facility]  Clinical Pharmacist

## 2022-04-25 LAB
ANION GAP SERPL CALCULATED.3IONS-SCNC: 11.8 MMOL/L (ref 5–15)
BUN SERPL-MCNC: 22 MG/DL (ref 8–23)
BUN/CREAT SERPL: 16.7 (ref 7–25)
CALCIUM SPEC-SCNC: 8.5 MG/DL (ref 8.6–10.5)
CHLORIDE SERPL-SCNC: 100 MMOL/L (ref 98–107)
CO2 SERPL-SCNC: 22.2 MMOL/L (ref 22–29)
CREAT SERPL-MCNC: 1.32 MG/DL (ref 0.76–1.27)
DEPRECATED RDW RBC AUTO: 47.9 FL (ref 37–54)
EGFRCR SERPLBLD CKD-EPI 2021: 53.2 ML/MIN/1.73
ERYTHROCYTE [DISTWIDTH] IN BLOOD BY AUTOMATED COUNT: 15.3 % (ref 12.3–15.4)
GLUCOSE BLDC GLUCOMTR-MCNC: 110 MG/DL (ref 70–130)
GLUCOSE BLDC GLUCOMTR-MCNC: 111 MG/DL (ref 70–130)
GLUCOSE BLDC GLUCOMTR-MCNC: 156 MG/DL (ref 70–130)
GLUCOSE BLDC GLUCOMTR-MCNC: 200 MG/DL (ref 70–130)
GLUCOSE SERPL-MCNC: 193 MG/DL (ref 65–99)
HCT VFR BLD AUTO: 33.6 % (ref 37.5–51)
HGB BLD-MCNC: 10.3 G/DL (ref 13–17.7)
INR PPP: 3.23 (ref 0.9–1.1)
MCH RBC QN AUTO: 26.3 PG (ref 26.6–33)
MCHC RBC AUTO-ENTMCNC: 30.7 G/DL (ref 31.5–35.7)
MCV RBC AUTO: 85.7 FL (ref 79–97)
PLATELET # BLD AUTO: 265 10*3/MM3 (ref 140–450)
PMV BLD AUTO: 11.6 FL (ref 6–12)
POTASSIUM SERPL-SCNC: 4.4 MMOL/L (ref 3.5–5.2)
PROTHROMBIN TIME: 33.2 SECONDS (ref 11.7–14.2)
RBC # BLD AUTO: 3.92 10*6/MM3 (ref 4.14–5.8)
SODIUM SERPL-SCNC: 134 MMOL/L (ref 136–145)
WBC NRBC COR # BLD: 5.41 10*3/MM3 (ref 3.4–10.8)

## 2022-04-25 PROCEDURE — 97530 THERAPEUTIC ACTIVITIES: CPT

## 2022-04-25 PROCEDURE — 80048 BASIC METABOLIC PNL TOTAL CA: CPT | Performed by: PHYSICAL MEDICINE & REHABILITATION

## 2022-04-25 PROCEDURE — 82962 GLUCOSE BLOOD TEST: CPT

## 2022-04-25 PROCEDURE — 97535 SELF CARE MNGMENT TRAINING: CPT

## 2022-04-25 PROCEDURE — 97110 THERAPEUTIC EXERCISES: CPT

## 2022-04-25 PROCEDURE — 97116 GAIT TRAINING THERAPY: CPT

## 2022-04-25 PROCEDURE — 85610 PROTHROMBIN TIME: CPT | Performed by: PHYSICAL MEDICINE & REHABILITATION

## 2022-04-25 PROCEDURE — 63710000001 INSULIN LISPRO (HUMAN) PER 5 UNITS: Performed by: PHYSICAL MEDICINE & REHABILITATION

## 2022-04-25 PROCEDURE — 85027 COMPLETE CBC AUTOMATED: CPT | Performed by: PHYSICAL MEDICINE & REHABILITATION

## 2022-04-25 RX ORDER — WARFARIN SODIUM 7.5 MG/1
7.5 TABLET ORAL 3 TIMES WEEKLY
Status: DISCONTINUED | OUTPATIENT
Start: 2022-04-25 | End: 2022-04-29

## 2022-04-25 RX ORDER — WARFARIN SODIUM 5 MG/1
5 TABLET ORAL
Status: DISCONTINUED | OUTPATIENT
Start: 2022-04-26 | End: 2022-04-29

## 2022-04-25 RX ADMIN — DIGOXIN 62.5 MCG: 125 TABLET ORAL at 11:43

## 2022-04-25 RX ADMIN — FAMOTIDINE 20 MG: 20 TABLET ORAL at 05:25

## 2022-04-25 RX ADMIN — MAGNESIUM OXIDE 400 MG (241.3 MG MAGNESIUM) TABLET 400 MG: TABLET at 08:54

## 2022-04-25 RX ADMIN — INSULIN LISPRO 2 UNITS: 100 INJECTION, SOLUTION INTRAVENOUS; SUBCUTANEOUS at 11:42

## 2022-04-25 RX ADMIN — METOPROLOL SUCCINATE 12.5 MG: 25 TABLET, EXTENDED RELEASE ORAL at 08:53

## 2022-04-25 RX ADMIN — Medication 1 CAPSULE: at 08:53

## 2022-04-25 RX ADMIN — MUPIROCIN 1 APPLICATION: 20 OINTMENT TOPICAL at 07:33

## 2022-04-25 RX ADMIN — FAMOTIDINE 20 MG: 20 TABLET ORAL at 16:34

## 2022-04-25 RX ADMIN — MAGNESIUM OXIDE 400 MG (241.3 MG MAGNESIUM) TABLET 400 MG: TABLET at 21:05

## 2022-04-25 RX ADMIN — NYSTATIN: 100000 POWDER TOPICAL at 21:06

## 2022-04-25 RX ADMIN — NYSTATIN: 100000 POWDER TOPICAL at 08:54

## 2022-04-25 RX ADMIN — ATORVASTATIN CALCIUM 40 MG: 20 TABLET, FILM COATED ORAL at 08:53

## 2022-04-25 RX ADMIN — MUPIROCIN 1 APPLICATION: 20 OINTMENT TOPICAL at 21:06

## 2022-04-25 RX ADMIN — WARFARIN 7.5 MG: 7.5 TABLET ORAL at 17:00

## 2022-04-25 NOTE — PROGRESS NOTES
Inpatient Rehabilitation Plan of Care Note    Plan of Care  Updated Problems/Interventions  Mobility    [PT] Stairs(Active)  Current Status(04/25/2022): TBD  Weekly Goal(05/02/2022): PT only  Discharge Goal: 2 steps w HR, CGA    [PT] Walk(Active)  Current Status(04/25/2022): 14' modAx2, WC following, L knee block  Weekly Goal(05/02/2022): PT only  Discharge Goal: 25', LRAD    [PT] Bed/Chair/Wheelchair(Active)  Current Status(04/25/2022): modA, squat pivot  Weekly Goal(05/02/2022): Emili, squat pivot  Discharge Goal: SV, LRAD    [PT] Bed Mobility(Active)  Current Status(04/25/2022): SBA  Weekly Goal(05/02/2022): SV  Discharge Goal: SV    Signed by: Goldie Abrams, PT

## 2022-04-25 NOTE — PROGRESS NOTES
Inpatient Rehabilitation Plan of Care Note    Plan of Care  Care Plan Reviewed - No updates at this time.    Psychosocial    Performed Intervention(s)  Provide distraction and/or relaxation as needed.  Allow extra time for patient to verbalize needs, feelings, concerns, etc.      Safety    Performed Intervention(s)  Hourly rounding.  Fall precautions: bed low and locked, patient belongings adn call light w/in  reach, nonskid socks and gait belt in use, bed and chair alarms in use.      Sphincter Control    Performed Intervention(s)  Monitor I/O.  Toileting schedule while awake.  Assist with urinal as needed, use incontinence products as needed.      Body Systems    Performed Intervention(s)  barrier cream and powder, as ordered.  Skin assessment q shift and prn  Low air loss bed (waffle overlay not available per cpd).  Turn q2h or encourage turning.    Signed by: Mickie Sharif RN

## 2022-04-25 NOTE — PROGRESS NOTES
LOS: 5 days   Patient Care Team:  Rubin Hinkle APRN as PCP - General (Family Medicine)  Audra Vazquez RODDY as Pharmacist  Vasquez Niño PharmD as Pharmacist (Pharmacy)  Tatiana Tinoco MD as Consulting Physician (Gastroenterology)      LAURA XIE  1935    Diagnoses    1. 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  Hypertension.  Impaired mobility/impaired self-care  Chronic kidney disease stage III  Hyperglycemia-Lantus  Anemia-ferrous sulfate  Recent C. difficile colitis-completed vancomycin taper      Subjective   Tolerating therapies.  Weakness and numbness in the left lower extremity is about the same.  No back pain.    Objective     Vitals:    04/25/22 0500   BP: 122/58   Pulse: 79   Resp: 18   Temp: 97.8 °F (36.6 °C)   SpO2: 98%       PHYSICAL EXAM:      MENTAL STATUS -  AWAKE / ALERT  HEENT- NCAT,   SCLERAE ANICTERIC, CONJUNCTIVAE PINK, OP MOIST, NO JVD, EARS UNREMARKABLE EXTERNALLY  LUNGS -normal respiration  HEART-irregular   Prosthetic click  ABD -soft nontender  EXT - NO EDEMA OR CYANOSIS  NEURO -   MOTOR EXAM - RUE/RLE 5/5. LUE 5/5.    Left hip flexion 2/5, adduction takes resistance, hip extension-active, knee extension 0/5, knee flexion active, ankle dorsiflexion 4/5, EHL 4/5, ankle plantarflexion takes resistance         MEDICATIONS  Scheduled Meds:atorvastatin, 40 mg, Oral, Daily  digoxin, 62.5 mcg, Oral, Daily  famotidine, 20 mg, Oral, BID AC  ferrous sulfate, 325 mg, Oral, Every Other Day  insulin glargine, 10 Units, Subcutaneous, Nightly  insulin lispro, 0-9 Units,  Subcutaneous, TID AC  lactobacillus acidophilus, 1 capsule, Oral, Daily  magnesium oxide, 400 mg, Oral, BID  metoprolol succinate XL, 12.5 mg, Oral, Daily  mupirocin, 1 application, Topical, Q8H  nystatin, , Topical, Q12H      Continuous Infusions:Pharmacy to dose warfarin,       PRN Meds:.•  acetaminophen **OR** [DISCONTINUED] acetaminophen **OR** [DISCONTINUED] acetaminophen  •  calcium carbonate  •  dextrose  •  dextrose  •  diphenhydrAMINE  •  glucagon (human recombinant)  •  hydrocortisone-bacitracin-zinc oxide-nystatin  •  nitroglycerin  •  ondansetron **OR** ondansetron  •  Pharmacy to dose warfarin      RESULTS  Glucose   Date/Time Value Ref Range Status   04/25/2022 0632 110 70 - 130 mg/dL Final     Comment:     Meter: [id number] : [id number] [facility] MELO   04/24/2022 2026 130 70 - 130 mg/dL Final     Comment:     Meter: [id number] : [id number] [facility]   04/24/2022 1617 108 70 - 130 mg/dL Final     Comment:     Meter: [id number] : [id number] Dian Gomes NA   04/24/2022 1105 216 (H) 70 - 130 mg/dL Final     Comment:     Meter: [id number] : [id number] Dian Martina NA   04/24/2022 0603 82 70 - 130 mg/dL Final     Comment:     Meter: [id number] : [id number] Mihai Humphrey NA   04/23/2022 2055 112 70 - 130 mg/dL Final     Comment:     Meter: [id number] : [id number] Kole AMIN RN   04/23/2022 1619 109 70 - 130 mg/dL Final     Comment:     Meter: [id number] : [id number] Dian Gomes NA   04/23/2022 1134 144 (H) 70 - 130 mg/dL Final     Comment:     Meter: [id number] : [id number] Dian Gomes NA     Results from last 7 days   Lab Units 04/24/22  0433 04/23/22  0758 04/22/22  0926   WBC 10*3/mm3 4.60 3.95 4.90   HEMOGLOBIN g/dL 9.2* 9.9* 10.4*   HEMATOCRIT % 30.8* 31.8* 33.9*   PLATELETS 10*3/mm3 208 196 263     Results from last 7 days   Lab Units 04/22/22  0926 04/20/22  0710 04/19/22  0723   SODIUM mmol/L 135* 138 137   POTASSIUM mmol/L 4.6 4.4 4.4    CHLORIDE mmol/L 101 106 105   CO2 mmol/L 24.3 26.7 27.2   BUN mg/dL 14 16 14   CREATININE mg/dL 1.18 1.12 1.10   CALCIUM mg/dL 8.6 8.4* 8.4*   GLUCOSE mg/dL 156* 70 96       Results from last 7 days   Lab Units 04/24/22  0433 04/23/22  0732 04/22/22  1950   INR  3.27* 2.69* 2.63*         Assessment/Plan     Hematoma of left iliopsoas muscle      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  The hip weakness is probably commendation of hematoma and iliopsoas as well as nerve impingement.  He is weaker with knee extension compared to hip adduction which would point to also component of femoral nerve palsy.     Reviewed features of upper lumbosacral plexopathy greater than lower lumbosacral plexopathy with the patient and his wife, reviewed timeframe of recovery is typically measured in months and may be incomplete, and reviewed need for bracing which will be determined and see how he progresses with his rehabilitation program.  Reviewed different styles of knee orthosis including dynamic knee extension versus lockable knee unit depending on level of stability he needs.        History of mechanical heart valve-  Mechanical aortic valve replacement  Prior CVA with residual left-sided weakness (INR at 2.2)  Chronic anticoagulation with goal INR of 3 - 3.5  INR goal 3.0-3.5 secondary to previous stroke-pharmacy managing  April 21-INR 2.92.  Discontinue Lovenox.  Continue Coumadin  April 22-INR trended back down to 2.5 to.  Had been on Coumadin 7.5 mg through April 19, received 10 mg on April 20, increased INR from 2.07 to 2.92.  Given the rate of  increase in INR, Coumadin was held yesterday.  INR trended back down to 2.52 today.  Anticipate Coumadin 10 mg dose today.  Will recheck INR this afternoon around 3:30 PM and if trend  lower, will give Lovenox 70 mg subcutaneous x1 and recheck INR in the a.m.. Concern would be for re-bleed as previously bleed on ASA and Lovenox to coumadin transition when INR was 1.6. Previous stroke when INR was 2.2.   April 25-INR 3.2    INR goal 3.0-3.5     Atrial fibrillation-digoxin/metoprolol  Nonischemic cardiomyopathy.  Hypertension.     Impaired mobility/impaired self-care     History of lower GI bleed with hematochezia in November 2021  Right rectus sheath hematoma in November 2021     Chronic kidney disease stage III  History of renal cell carcinoma and right nephrectomy      Hyperglycemia-Lantus  April 21-blood glucose low this morning and decrease Lantus     Anemia-ferrous sulfate     Recent C. difficile colitis-completed vancomycin taper     Now admit for comprehensive acute inpatient rehabilitation .  This would be an interdisciplinary program with physical therapy 1.5 hour,  occupational therapy 1.5 hour,  5 days a week.  Rehabilitation nursing for carryover, monitoring of cardiac and neurologic   status, bowel and bladder, and skin  Ongoing physician follow-up.  Weekly team conferences.  Goals are to achieve a level of contact-guard assist with  mobility and self-care and improved strength.   Rehabilitation prognosis indeterminate.  Medical prognosis indeterminate.  Estimated length of stay is approximately 2 weeks, but is only an estimation.      The patient's functional status and clinical status is unchanged from preadmission assessment and the patient continues appropriate for acute inpatient rehabilitation.  Goal is for home with outpatient   therapies.  Barrier to discharge: Impaired mobility and self-care- work on strength, transfers, balance, progressive ambulation, bracing, activities of daily living to overcome.          Pharmacy dosing Coumadin. Bactroban ointment ordered for Left shoulder abrasion, Decrease Lantus to 10U at HS.       Grant Andrade MD      During rounds, used  appropriate personal protective equipment including mask and gloves.  Additional gown if indicated.  Mask used was standard procedure mask. Appropriate PPE was worn during the entire visit.  Hand hygiene was completed before and after.

## 2022-04-25 NOTE — THERAPY TREATMENT NOTE
Inpatient Rehabilitation - Occupational Therapy Treatment Note    [facility]     Patient Name: Francisco Sequeira  : 1935  MRN: [id number]    Today's Date: 2022                 Admit Date: 2022         ICD-10-CM ICD-9-CM   1. 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)   • Iron deficiency anemia   • Chronic anticoagulation   • Hemiparesis of left nondominant side as late effect of cerebral infarction (HCC)   • Rectus sheath hematoma   • Sepsis (HCC)   • Calculus of gallbladder with acute cholecystitis without obstruction   • History of Clostridium difficile infection   • Aspiration pneumonitis (HCC)   • Hematoma of iliopsoas muscle, left, initial encounter   • History of CVA (cerebrovascular accident)   • S/P laparoscopic cholecystectomy   • Anemia   • Hematoma of left iliopsoas muscle       Past Medical History:   Diagnosis Date   • Allergic rhinitis    • Aortic valve insufficiency    • Ascending aortic aneurysm (HCC)    • Atrial fibrillation (HCC)    • Bacteremia    • Calcific aortic stenosis of bicuspid valve    • Cardiac arrest (HCC)    • Cardiomyopathy (HCC)    • CKD (chronic kidney disease) stage 3, GFR 30-59 ml/min (HCC)    • Contact dermatitis due to poison ivy    • Elbow fracture    • Esophageal reflux    • GERD (gastroesophageal reflux disease)    • Head injury    • History of transfusion    • Hyperglycemia    • Hyperlipidemia    • Hypertension    • Kidney carcinoma (HCC)    • Nonischemic  cardiomyopathy (HCC)    • Renal oncocytoma    • Seasonal allergic reaction    • Sinusitis    • Syncope    • Type 2 diabetes mellitus (HCC)     uncontrolled   • Visual field defect        Past Surgical History:   Procedure Laterality Date   • AORTIC VALVE REPAIR/REPLACEMENT     • ASCENDING AORTIC ANEURYSM REPAIR W/ MECHANICAL AORTIC VALVE REPLACEMENT     • CHOLECYSTECTOMY WITH INTRAOPERATIVE CHOLANGIOGRAM N/A 3/29/2022    Procedure: Laparoscopic cholecystectomy with cholangiogram, possible open;  Surgeon: Lisa Guidry MD;  Location: [facility] MAIN OR;  Service: General;  Laterality: N/A;   • COLONOSCOPY N/A 10/28/2021    Procedure: COLONOSCOPY to cecum:  cold snare polyps,;  Surgeon: Dinesh Meza MD;  Location: [facility] ENDOSCOPY;  Service: Gastroenterology;  Laterality: N/A;  pre:  Iron deficiency anemia  post:  polyps, diverticulosis,    • COLONOSCOPY N/A 11/9/2021    Procedure: COLONOSCOPY to cecum with APC cautery of AVM and clip placement x1;  Surgeon: Pavan Rodriguez MD;  Location: [facility] ENDOSCOPY;  Service: Gastroenterology;  Laterality: N/A;  PRE - gi bleed, anemia  POST - diverticulosis, poor prep, AVM right colon   • ENDOSCOPY N/A 10/28/2021    Procedure: ESOPHAGOGASTRODUODENOSCOPY with biopsies;  Surgeon: Dinesh Meza MD;  Location: [facility] ENDOSCOPY;  Service: Gastroenterology;  Laterality: N/A;  pre:  Iron deficiency anemia  post:  duodenitis and gastritis   • EYE SURGERY  12/09/2020    cataract surgery    • NEPHRECTOMY     • OTHER SURGICAL HISTORY      elbow surgery   • OTHER SURGICAL HISTORY      right arm surgery   • PROSTATE SURGERY     • THORACENTESIS Left     diagnostic             IRF OT ASSESSMENT FLOWSHEET (last 12 hours)     IRF OT Evaluation and Treatment     Row Name 04/25/22 1538 04/25/22 1207       OT Time and Intention    Document Type daily treatment  -DN daily treatment  -DN    Mode of Treatment occupational therapy  -DN occupational therapy  -DN    Patient Effort  good  -DN adequate  -DN    Symptoms Noted During/After Treatment -- fatigue  -DN    Comment, Evaluation/Treatment Not Performed -- pt declined adls states nsg cleaned mid-section this and wife assisted yesterday  -DN    Row Name 04/25/22 1207          Grooming    [place] Level (Grooming) grooming skills;oral care regimen;supervision;verbal cues  -DN     Position (Grooming) supported sitting  -DN     Row Name 04/25/22 1207          Transfers    Chair-Bed [place] (Transfers) minimum assist (75% patient effort)  -DN     Row Name 04/25/22 1207          Chair-Bed Transfer    Assistive Device (Chair-Bed Transfers) wheelchair  -DN     Comment, (Chair-Bed Transfer) squat pivot w/c to bed  -DN     Row Name 04/25/22 1207          Shoulder (Therapeutic Exercise)    Shoulder (Therapeutic Exercise) strengthening exercise  -DN     Shoulder AROM (Therapeutic Exercise) 10 repetitions;3 sets  -DN     Shoulder Strengthening (Therapeutic Exercise) 2 lb free weight  dowel tesha and yellow theraputty exercises foro horizontal AB duction and chest press  -DN     Row Name 04/25/22 1538 04/25/22 1207       Elbow/Forearm (Therapeutic Exercise)    Elbow/Forearm (Therapeutic Exercise) strengthening exercise  -DN strengthening exercise  -DN    Elbow/Forearm AROM (Therapeutic Exercise) bilateral;10 repetitions;3 sets  -DN bilateral;10 repetitions;3 sets  -DN    Elbow/Forearm Strengthening (Therapeutic Exercise) 2 lb free weight  -DN 2 lb free weight  -DN    Row Name 04/25/22 1538 04/25/22 1207       Wrist (Therapeutic Exercise)    Wrist (Therapeutic Exercise) strengthening exercise  -DN strengthening exercise  -DN    Wrist AROM (Therapeutic Exercise) bilateral;10 repetitions;3 sets  -DN bilateral  -DN    Wrist Strengthening (Therapeutic Exercise) 2 lb free weight  -DN 2 lb free weight  -DN    Row Name 04/25/22 1538 04/25/22 1207       Hand (Therapeutic Exercise)    Hand (Therapeutic Exercise) strengthening exercise  -DN strengthening  exercise  -DN    Hand Strengthening (Therapeutic Exercise) hand gripper;bilateral  -DN bilateral;hand gripper  -DN    Row Name 04/25/22 1538 04/25/22 1207       Balance    Static Standing Balance contact guard  pt able to stand during BUE activity without LLE buckle for 2 sets each between 1 and 2 minutes, however very fatigued and requires 3-4 minute rest breaks inbetween  -DN contact guard;verbal cues  standing from w/c to add cushion to current seat  -DN    Row Name 04/25/22 1538 04/25/22 1207       Positioning and Restraints    Pre-Treatment Position sitting in chair/recliner  -DN in bed  -DN    Post Treatment Position wheelchair  -DN bed  -DN    In Bed sitting;call light within reach;encouraged to call for assist;exit alarm on;with family/caregiver  -DN sitting  -DN          User Key  (r) = Recorded By, (t) = Taken By, (c) = Cosigned By    Initials Name Effective Dates    DN Francisco Jane OT 06/16/21 -                  Occupational Therapy Education                 Title: PT OT SLP Therapies (In Progress)     Topic: Occupational Therapy (Not Started)     Point: ADL training (Not Started)     Description:   Instruct learner(s) on proper safety adaptation and remediation techniques during self care or transfers.   Instruct in proper use of assistive devices.              Learner Progress:  Not documented in this visit.          Point: Home exercise program (Not Started)     Description:   Instruct learner(s) on appropriate technique for monitoring, assisting and/or progressing therapeutic exercises/activities.              Learner Progress:  Not documented in this visit.          Point: Precautions (Not Started)     Description:   Instruct learner(s) on prescribed precautions during self-care and functional transfers.              Learner Progress:  Not documented in this visit.          Point: Body mechanics (Not Started)     Description:   Instruct learner(s) on proper positioning and spine alignment during  self-care, functional mobility activities and/or exercises.              Learner Progress:  Not documented in this visit.                                OT Recommendation and Plan    Planned Therapy Interventions (OT): activity tolerance training, adaptive equipment training, BADL retraining, ROM/therapeutic exercise, strengthening exercise, transfer/mobility retraining                    Time Calculation:      Time Calculation- OT     Row Name 04/25/22 1400 04/25/22 0930          Time Calculation- OT    OT Start Time 1400  -DN 0930  -DN     OT Stop Time 1430  -DN 1030  -DN     OT Time Calculation (min) 30 min  -DN 60 min  -DN           User Key  (r) = Recorded By, (t) = Taken By, (c) = Cosigned By    Initials Name Provider Type    Francisco Batista OT Occupational Therapist              Therapy Charges for Today     Code Description Service Date Service Provider Modifiers Qty    97781091856 HC OT SELF CARE/MGMT/TRAIN EA 15 MIN 4/25/2022 Francisco Jane OT GO 1    16284195975 HC OT THER PROC EA 15 MIN 4/25/2022 Francisco Jane OT GO 3    14242647054 HC OT THER PROC EA 15 MIN 4/25/2022 Francisco Jane OT GO 2                   Francisco Jane OT  4/25/2022

## 2022-04-25 NOTE — CONSULTS
Nutrition Services    Patient Name: Francisco Sequeira  YOB: 1935  MRN: [id number]  Admission date: 4/20/2022    Comment: Pt is eating well, %. Wife reports he likes the alternative options/menu provided.Will continue to follow.      CLINICAL NUTRITION ASSESSMENT      Reason for Assessment Rehab follow up  Pressure injury/skin status      H&P  86 y.o. male former smoker with a history of valvular heart disease, afib, chronic AC, CKD, DM, HTN, NICM, and multiple medical issues that presents to [facility] complaining of LLE weakness, swelling on 4/5/22. He was discharged from [facility] 4/4/22 following hospitalization for acute cholecystitis s/p lap cholecystectomy 3/29/22 and aspiration pneumonia. He was discharged on lovenox bridge and warfarin for subtherapeutic INR. Patient had hematoma of the left iliopsoas muscle.  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  There was attempt to aspirate hematoma but were not able to obtain any material    Past Medical History:   Diagnosis Date   • Allergic rhinitis    • Aortic valve insufficiency    • Ascending aortic aneurysm (HCC)    • Atrial fibrillation (HCC)    • Bacteremia    • Calcific aortic stenosis of bicuspid valve    • Cardiac arrest (HCC)    • Cardiomyopathy (HCC)    • CKD (chronic kidney disease) stage 3, GFR 30-59 ml/min (HCC)    • Contact dermatitis due to poison ivy    • Elbow fracture    • Esophageal reflux    • GERD (gastroesophageal reflux disease)    • Head injury    • History of transfusion    • Hyperglycemia    • Hyperlipidemia    • Hypertension    • Kidney carcinoma (HCC)    • Nonischemic cardiomyopathy (HCC)    • Renal oncocytoma    • Seasonal allergic reaction    • Sinusitis    • Syncope    • Type 2 diabetes mellitus (HCC)     uncontrolled   • Visual field defect        Past Surgical History:   Procedure Laterality Date   • AORTIC VALVE REPAIR/REPLACEMENT     • ASCENDING AORTIC ANEURYSM REPAIR W/  MECHANICAL AORTIC VALVE REPLACEMENT     • CHOLECYSTECTOMY WITH INTRAOPERATIVE CHOLANGIOGRAM N/A 3/29/2022    Procedure: Laparoscopic cholecystectomy with cholangiogram, possible open;  Surgeon: Lisa Guidry MD;  Location: [facility] MAIN OR;  Service: General;  Laterality: N/A;   • COLONOSCOPY N/A 10/28/2021    Procedure: COLONOSCOPY to cecum:  cold snare polyps,;  Surgeon: Dinesh Meza MD;  Location: [facility] ENDOSCOPY;  Service: Gastroenterology;  Laterality: N/A;  pre:  Iron deficiency anemia  post:  polyps, diverticulosis,    • COLONOSCOPY N/A 11/9/2021    Procedure: COLONOSCOPY to cecum with APC cautery of AVM and clip placement x1;  Surgeon: Pavan Rodriguez MD;  Location: [facility] ENDOSCOPY;  Service: Gastroenterology;  Laterality: N/A;  PRE - gi bleed, anemia  POST - diverticulosis, poor prep, AVM right colon   • ENDOSCOPY N/A 10/28/2021    Procedure: ESOPHAGOGASTRODUODENOSCOPY with biopsies;  Surgeon: Dinesh Meza MD;  Location: [facility] ENDOSCOPY;  Service: Gastroenterology;  Laterality: N/A;  pre:  Iron deficiency anemia  post:  duodenitis and gastritis   • EYE SURGERY  12/09/2020    cataract surgery    • NEPHRECTOMY     • OTHER SURGICAL HISTORY      elbow surgery   • OTHER SURGICAL HISTORY      right arm surgery   • PROSTATE SURGERY     • THORACENTESIS Left     diagnostic        Current Problems   Weakness following acute hospitalization       Encounter Information        Nutrition/Diet History:    4/21:Intake improved for past few days, intake % on acute. Reported reduced oral intake over the last year and weight loss of about 50 lb, unintentional.     4/25: Good intake %. Spoke with wife earlier.      Typical Intake:    Food Preferences: Doesn't like salads. Prefers Diet Coke > Diet Pepsi. Loves tomato soup and will consume Q lunch, dinner if provided.    Meal/Snack Patterns:    Supplements:    Typical Activity Pattern:    Factors Affecting Intake: Weakness, generalized  "deconditioning/weakness   Tests/Procedures:      Anthropometrics        Current Height  Current Weight Height: 182.9 cm (72\")  Weight: 60.2 kg (132 lb 11.5 oz) (04/20/22 1639)       Admission Weight 156 lb (4/6 stated), vs 132 lb (4/20 bed scale on rehab)   Ideal Body Weight (IBW) 178 lb   Usual Body Weight (UBW) 180-190 lb in 2021, has been in 150 lb range more recently and down from that with his recent gallbladder & psoas issues       Trending Weight Hx     Weight Change see below - wide variance within past month             PTA:     Wt Readings from Last 30 Encounters:   04/20/22 1639 60.2 kg (132 lb 11.5 oz)   04/11/22 1202 68 kg (150 lb)   04/08/22 1241 68.5 kg (151 lb 0.2 oz)   04/06/22 0047 70.8 kg (156 lb)   04/04/22 0510 71.1 kg (156 lb 12 oz)   04/03/22 0540 71.7 kg (158 lb 1.1 oz)   04/02/22 0518 72.1 kg (158 lb 15.2 oz)   04/01/22 0520 71.9 kg (158 lb 8.2 oz)   03/31/22 0654 71 kg (156 lb 8.4 oz)   03/30/22 0638 75.2 kg (165 lb 12.6 oz)   03/29/22 0535 70.3 kg (155 lb)   03/28/22 0540 69.4 kg (153 lb 1.6 oz)   03/27/22 0527 69.2 kg (152 lb 9.6 oz)   03/26/22 0132 68 kg (149 lb 14.4 oz)   03/25/22 0530 66.8 kg (147 lb 4.8 oz)   03/24/22 0455 65.3 kg (143 lb 15.4 oz)   03/23/22 1529 65.3 kg (144 lb)   03/23/22 1316 71 kg (156 lb 9.6 oz)   03/21/22 0815 66.2 kg (146 lb)   03/16/22 0853 66.2 kg (146 lb)   03/15/22 0840 66.6 kg (146 lb 14.4 oz)   03/11/22 1117 (!) 144 kg (317 lb 7.4 oz)   03/07/22 1404 67.1 kg (148 lb)   03/02/22 0509 62.7 kg (138 lb 4.8 oz)   03/01/22 0549 64.6 kg (142 lb 6.4 oz)   02/28/22 0500 63.6 kg (140 lb 3.2 oz)   02/27/22 0541 68 kg (149 lb 14.6 oz)   02/26/22 0609 66.8 kg (147 lb 4.3 oz)   02/25/22 0529 66.6 kg (146 lb 14.4 oz)   02/24/22 0620 68.6 kg (151 lb 3.2 oz)   02/23/22 0500 69.4 kg (153 lb)   02/22/22 1818 71.6 kg (157 lb 12.8 oz)   02/15/22 1510 68 kg (149 lb 14.6 oz)   02/15/22 0614 68 kg (150 lb)   01/31/22 0737 68 kg (150 lb)   01/28/22 1350 68 kg (150 lb)   12/20/21 " 0854 73.2 kg (161 lb 4.8 oz)   11/30/21 0956 70.8 kg (156 lb)   11/19/21 1356 70.8 kg (156 lb)   11/16/21 0500 71 kg (156 lb 9.6 oz)   11/15/21 0523 67.1 kg (147 lb 14.9 oz)   11/14/21 0548 72.5 kg (159 lb 13.3 oz)   11/13/21 1600 73.6 kg (162 lb 4.8 oz)   11/02/21 0344 81.6 kg (180 lb)   10/24/21 1428 85.3 kg (188 lb)   10/23/21 2010 85.3 kg (188 lb 0.8 oz)   10/23/21 1541 81.6 kg (180 lb)   07/30/21 1133 86.2 kg (190 lb)   06/02/21 0820 83.3 kg (183 lb 9.6 oz)   04/13/21 0759 82.6 kg (182 lb)   03/02/21 0811 83.6 kg (184 lb 6.4 oz)   12/17/20 1258 81.8 kg (180 lb 6.4 oz)   11/15/20 0845 81.6 kg (180 lb)   08/28/20 1523 83.7 kg (184 lb 9.6 oz)   07/29/20 1105 82.6 kg (182 lb)   05/15/20 0807 83.9 kg (185 lb)   03/12/20 1102 81.6 kg (180 lb)   02/14/20 0738 82.9 kg (182 lb 12.8 oz)   01/19/20 0547 93.9 kg (207 lb 1.6 oz)   01/18/20 0521 79.5 kg (175 lb 4.8 oz)   01/17/20 0500 78.9 kg (174 lb)   01/16/20 0500 76.3 kg (168 lb 3.2 oz)   01/15/20 0510 77.1 kg (169 lb 14.4 oz)   01/14/20 0500 82.2 kg (181 lb 3.5 oz)   01/13/20 1843 81 kg (178 lb 9.2 oz)   01/13/20 0714 81.6 kg (180 lb)   11/15/19 0734 81.6 kg (179 lb 12.8 oz)   08/15/19 0733 81.2 kg (179 lb 1.6 oz)      BMI kg/m2 Body mass index is 18 kg/m².       Labs       Pertinent Labs    Results from last 7 days   Lab Units 04/22/22  0926 04/20/22  0710 04/19/22  0723   SODIUM mmol/L 135* 138 137   POTASSIUM mmol/L 4.6 4.4 4.4   CHLORIDE mmol/L 101 106 105   CO2 mmol/L 24.3 26.7 27.2   BUN mg/dL 14 16 14   CREATININE mg/dL 1.18 1.12 1.10   CALCIUM mg/dL 8.6 8.4* 8.4*   GLUCOSE mg/dL 156* 70 96     Results from last 7 days   Lab Units 04/25/22  1037 04/21/22  0821 04/20/22  0710 04/19/22  0723   MAGNESIUM mg/dL  --   --  2.3 2.3   PHOSPHORUS mg/dL  --   --  2.9 3.0   HEMOGLOBIN g/dL 10.3*   < > 9.7* 9.3*   HEMATOCRIT % 33.6*   < > 30.5* 29.2*    < > = values in this interval not displayed.     COVID19   Date Value Ref Range Status   04/20/2022 Not Detected Not  "Detected - Ref. Range Final     Lab Results   Component Value Date    HGBA1C 6.7 03/21/2022        Medications   Scheduled Medications atorvastatin, 40 mg, Oral, Daily  digoxin, 62.5 mcg, Oral, Daily  famotidine, 20 mg, Oral, BID AC  ferrous sulfate, 325 mg, Oral, Every Other Day  insulin glargine, 10 Units, Subcutaneous, Nightly  insulin lispro, 0-9 Units, Subcutaneous, TID AC  lactobacillus acidophilus, 1 capsule, Oral, Daily  magnesium oxide, 400 mg, Oral, BID  metoprolol succinate XL, 12.5 mg, Oral, Daily  mupirocin, 1 application, Topical, Q8H  nystatin, , Topical, Q12H       Infusions Pharmacy to dose warfarin,        PRN Medications •  acetaminophen **OR** [DISCONTINUED] acetaminophen **OR** [DISCONTINUED] acetaminophen  •  calcium carbonate  •  dextrose  •  dextrose  •  diphenhydrAMINE  •  glucagon (human recombinant)  •  hydrocortisone-bacitracin-zinc oxide-nystatin  •  nitroglycerin  •  ondansetron **OR** ondansetron  •  Pharmacy to dose warfarin     Physical Findings        Physical Appearance, NFPE Room air. Awake, alert.    Generalized wasting; temporal  & orbital hollowing, prominent clavicle & acromion bones; very thin tricep/bicep area   --  Edema  1-2+ edema LLE   Gastrointestinal Fecal incontinence, BM 4/25   Tubes/Drains    Oral/Mouth Cavity Missing teeth   Skin Sacral spine stage 2 (open slit), Gomez 14   --    Estimated/Assessed Needs       Energy Requirements    Height for Calculation  Height: 182.9 cm (72\")   Weight for Calculation 60.2 kg   Method for Estimation  30-35 benigno/kg   EST Needs (kcal/day) 4286-4389 benigno       Protein Requirements    Weight for Calculation 60.2 kg   EST Protein Needs (g/kg) 1.2-1.5   EST Daily Needs (g/day) 72-90 gm       Fluid Requirements     Estimated Needs (mL/day) 1800     Current Nutrition Orders & Evaluation of Intake       Oral Nutrition     Food Allergies NKFA   Current PO Diet Diet Regular; Consistent Carbohydrate   Supplement    PO Evaluation     Trending " % PO Intake % x 3 days   --  Nutritional Risk Screening       MST SCORE   5     Nutrition Diagnosis        Nutrition Dx Problem 1 Unintentional weight loss over the past year due to reduced appetite/intake associated with multiple hospitalizations for GI issues and psoas hematoma     Nutrition Dx Problem 2        Intervention Goal        Intervention Goal(s) Improved intake trend  Meet estimated needs  Appropriate weight gain     Nutrition Intervention        RD Action Encourage intake  Follow Tx progress  Advise alternative food choices  Menus provided  Interview for preferences  Offered/declined ONS     Nutrition Prescription        Diet Prescription    Supplement Prescription    --  Monitor/Evaluation        Monitor Per protocol, I&O, PO intake, Pertinent labs, Weight, Skin status, GI status, Symptoms, POC/GOC     RD to follow per protocol.      Electronically signed by:  Shanon Chase RD  04/25/22 11:37 EDT

## 2022-04-25 NOTE — PLAN OF CARE
Goal Outcome Evaluation:  Plan of Care Reviewed With: patient        Progress: improving  Outcome Evaluation: Pt's foam dressing changed to coccyx today. No pain. Weight shifting every 2 hours while in bed.

## 2022-04-25 NOTE — PLAN OF CARE
Problem: Rehabilitation (IRF) Plan of Care  Goal: Plan of Care Review  Outcome: Ongoing, Progressing  Flowsheets (Taken 4/25/2022 0002)  Progress: improving  Plan of Care Reviewed With: patient  Outcome Evaluation: Pat A&Ox4, pleasant and cooperative. Incontinent of B&B. Pt able to turn self in bed and independently turned himself throughout the night. Coccyx and buttock area skin fragile also very bony. Sacral mepilex on and patient on low airloss mattress. Dressing to shoulder area changed this AM. No c/o any pain. Pt slept well tonight.

## 2022-04-25 NOTE — PROGRESS NOTES
Case Management  Inpatient Rehabilitation Plan of Care and Discharge Plan Note    Rehabilitation Diagnosis:  Branch  Date of Onset:  Branch    Medical Summary:  Branch  Past Medical History: Branch    Plan of Care  Updated Problems/Interventions  Field    Expected Intensity:  Branch  Interdisciplinary Team:  Inocente  Estimated Length of Stay/Anticipated Discharge Date: Branch  Anticipated Discharge Destination:  Anticipated discharge destination from inpatient rehabilitation is community  discharge with assistance. Patient lives with wife in one story home; 2 step  entry with rail.  D/C plan is home with wife. Wife works but will plan to work from home so can  assist patient. Patient was current with [facility].      Based on the patient's medical and functional status, their prognosis and  expected level of functional improvement is:  Inocente    Signed by: PAULINE Cronin

## 2022-04-25 NOTE — PLAN OF CARE
Goal Outcome Evaluation:  Plan of Care Reviewed With: patient        Progress: improving  Outcome Evaluation: AAOx4. Cooperative with all care. Up with assist of 2. left sided weakness. Incontinent of B&B. Skin care as ordered; no open areas noted. VS stable.

## 2022-04-25 NOTE — THERAPY TREATMENT NOTE
Inpatient Rehabilitation - Physical Therapy Treatment Note       [facility]     Patient Name: Francisco Sequeira  : 1935  MRN: [id number]    Today's Date: 2022                    Admit Date: 2022      Visit Dx:     ICD-10-CM ICD-9-CM   1. 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)   • Iron deficiency anemia   • Chronic anticoagulation   • Hemiparesis of left nondominant side as late effect of cerebral infarction (HCC)   • Rectus sheath hematoma   • Sepsis (HCC)   • Calculus of gallbladder with acute cholecystitis without obstruction   • History of Clostridium difficile infection   • Aspiration pneumonitis (HCC)   • Hematoma of iliopsoas muscle, left, initial encounter   • History of CVA (cerebrovascular accident)   • S/P laparoscopic cholecystectomy   • Anemia   • Hematoma of left iliopsoas muscle       Past Medical History:   Diagnosis Date   • Allergic rhinitis    • Aortic valve insufficiency    • Ascending aortic aneurysm (HCC)    • Atrial fibrillation (HCC)    • Bacteremia    • Calcific aortic stenosis of bicuspid valve    • Cardiac arrest (HCC)    • Cardiomyopathy (HCC)    • CKD (chronic kidney disease) stage 3, GFR 30-59 ml/min (HCC)    • Contact dermatitis due to poison ivy    • Elbow fracture    • Esophageal reflux    • GERD (gastroesophageal reflux disease)    • Head injury    • History of transfusion    • Hyperglycemia    • Hyperlipidemia    • Hypertension    • Kidney carcinoma (HCC)    •  Nonischemic cardiomyopathy (HCC)    • Renal oncocytoma    • Seasonal allergic reaction    • Sinusitis    • Syncope    • Type 2 diabetes mellitus (HCC)     uncontrolled   • Visual field defect        Past Surgical History:   Procedure Laterality Date   • AORTIC VALVE REPAIR/REPLACEMENT     • ASCENDING AORTIC ANEURYSM REPAIR W/ MECHANICAL AORTIC VALVE REPLACEMENT     • CHOLECYSTECTOMY WITH INTRAOPERATIVE CHOLANGIOGRAM N/A 3/29/2022    Procedure: Laparoscopic cholecystectomy with cholangiogram, possible open;  Surgeon: Lisa Guidry MD;  Location: [facility] MAIN OR;  Service: General;  Laterality: N/A;   • COLONOSCOPY N/A 10/28/2021    Procedure: COLONOSCOPY to cecum:  cold snare polyps,;  Surgeon: Dinesh Meza MD;  Location: [facility] ENDOSCOPY;  Service: Gastroenterology;  Laterality: N/A;  pre:  Iron deficiency anemia  post:  polyps, diverticulosis,    • COLONOSCOPY N/A 11/9/2021    Procedure: COLONOSCOPY to cecum with APC cautery of AVM and clip placement x1;  Surgeon: Pavan Rodriguez MD;  Location: [facility] ENDOSCOPY;  Service: Gastroenterology;  Laterality: N/A;  PRE - gi bleed, anemia  POST - diverticulosis, poor prep, AVM right colon   • ENDOSCOPY N/A 10/28/2021    Procedure: ESOPHAGOGASTRODUODENOSCOPY with biopsies;  Surgeon: Dinesh Meza MD;  Location: [facility] ENDOSCOPY;  Service: Gastroenterology;  Laterality: N/A;  pre:  Iron deficiency anemia  post:  duodenitis and gastritis   • EYE SURGERY  12/09/2020    cataract surgery    • NEPHRECTOMY     • OTHER SURGICAL HISTORY      elbow surgery   • OTHER SURGICAL HISTORY      right arm surgery   • PROSTATE SURGERY     • THORACENTESIS Left     diagnostic       PT ASSESSMENT (last 12 hours)     IRF PT Evaluation and Treatment     Row Name 04/25/22 1410          PT Time and Intention    Document Type daily treatment  -MS     Mode of Treatment physical therapy  -MS     Patient/Family/Caregiver Comments/Observations AM and PM: supine in bed with HOB  elevated, NAD,  spouse at bedside  -MS     Total Minutes, Physical Therapy 90  -MS     Row Name 04/25/22 1410          General Information    Existing Precautions/Restrictions fall  -MS     Limitations/Impairments safety/cognitive  -MS     Row Name 04/25/22 1410          Pain Assessment    Pretreatment Pain Rating 0/10 - no pain  -MS     Posttreatment Pain Rating 0/10 - no pain  -MS     Pre/Posttreatment Pain Comment 'Grunts' with movement but denies pain, spouse reports this is baseline  -MS     Row Name 04/25/22 1410          Cognition/Psychosocial    Orientation Status (Cognition) oriented x 4  -MS     Follows Commands (Cognition) follows three-step commands;75-90% accuracy;increased processing time needed;physical/tactile prompts required;verbal cues/prompting required;repetition of directions required  -MS     Personal Safety Interventions fall prevention program maintained;gait belt;nonskid shoes/slippers when out of bed  -MS     Safety Deficit (Cognition) minimal deficit  -MS     Row Name 04/25/22 1410          Bed Mobility    Supine-Sit [place] (Bed Mobility) standby assist  -MS     Assistive Device (Bed Mobility) bed rails;head of bed elevated  -MS     Row Name 04/25/22 1410          Transfers    Bed-Chair [place] (Transfers) minimum assist (75% patient effort);moderate assist (50% patient effort);verbal cues;nonverbal cues (demo/gesture)  -MS     Assistive Device (Bed-Chair Transfers) wheelchair  -MS     Sit-Stand [place] (Transfers) minimum assist (75% patient effort);verbal cues;nonverbal cues (demo/gesture)  -MS     Stand-Sit [place] (Transfers) minimum assist (75% patient effort);verbal cues;nonverbal cues (demo/gesture)  -MS     [place] Level (Toilet Transfer) moderate assist (50% patient effort);maximum assist (25% patient effort);verbal cues;nonverbal cues (demo/gesture)  -MS     Assistive Device (Toilet Transfer) wheelchair;grab bars/safety frame  -MS     Row Name 04/25/22  1410          Bed-Chair Transfer    Comment, (Bed-Chair Transfer) squat pivot bed<>wc x2  -MS     Row Name 04/25/22 1410          Sit-Stand Transfer    Assistive Device (Sit-Stand Transfers) wheelchair;parallel bars  -MS     Row Name 04/25/22 1410          Stand-Sit Transfer    Assistive Device (Stand-Sit Transfers) wheelchair;parallel bars  -MS     Row Name 04/25/22 1410          Toilet Transfer    Type (Toilet Transfer) sit-stand;stand-sit  -MS     Comment, (Toilet Transfer) Up to maxA at times due to toileting needs and L knee buckling  -MS     Row Name 04/25/22 1410          Gait/Stairs (Locomotion)    [place] Level (Gait) moderate assist (50% patient effort);verbal cues;nonverbal cues (demo/gesture)  -MS     Distance in Feet (Gait) 10' total  -MS     Pattern (Gait) step-to  -MS     Deviations/Abnormal Patterns (Gait) raul decreased;left sided deviations;stride length decreased  -MS     Bilateral Gait Deviations forward flexed posture  -MS     Left Sided Gait Deviations knee buckling, left side;heel strike decreased;weight shift ability decreased  -MS     Comment, (Gait/Stairs) Trialed anterior trulife AFO to incr knee stability- patient reported uncomfortable and unable to attempt ambulating with it. Improvement noted with static balance but when advancing LEs, buckling occurs.  -MS     Row Name 04/25/22 1410          Balance    Static Sitting Balance supervision  -MS     Position, Sitting Balance sitting in chair  -MS     Sit to Stand Dynamic Balance minimal assist;moderate assist  -MS     Static Standing Balance contact guard  -MS     Dynamic Standing Balance minimal assist;moderate assist  -MS     Position/Device Used, Standing Balance parallel bars  -MS     Balance Interventions standing;static;dynamic;weight shifting activity  -MS     Comment, Balance Static standing at // bars with max cues for posture and Lknee extension, mirror placed in front of patient to assist with visual feedback. Able to  stand up to 45s with each trial when performing static balance. Weight shifting and pre-gait activities performed, therapist on stool to block L knee as needed.  -MS     Row Name 04/25/22 1410          Hip (Therapeutic Exercise)    Hip (Therapeutic Exercise) AAROM (active assistive range of motion)  -MS     Hip AAROM (Therapeutic Exercise) sitting;left;flexion;10 repetitions;3 sets  -MS     Hip Strengthening (Therapeutic Exercise) sitting;right;marching while seated;10 repetitions;3 sets  -MS     Row Name 04/25/22 1410          Knee (Therapeutic Exercise)    Knee PROM (Therapeutic Exercise) sitting;left;extension;10 repetitions  x 3 sets  -MS     Knee Strengthening (Therapeutic Exercise) sitting;right;LAQ (long arc quad);10 repetitions;3 sets  -MS     Row Name 04/25/22 1410          Positioning and Restraints    Pre-Treatment Position in bed  -MS     Post Treatment Position wheelchair  -MS     In Wheelchair sitting;call light within reach;encouraged to call for assist;exit alarm on;with family/caregiver  AM and PM session  -MS           User Key  (r) = Recorded By, (t) = Taken By, (c) = Cosigned By    Initials Name Provider Type    MS AlizaGoldie, PT Physical Therapist              Wound 03/29/22 1858 abdomen Incision (Active)   Closure Liquid skin adhesive 04/25/22 0737   Base dry;closed/resurfaced 04/25/22 0737   Drainage Amount none 04/25/22 0737       Wound 04/15/22 0352 Left posterior cervical spine Abrasion (Active)   Closure Adhesive bandage 04/25/22 0737   Base pink 04/25/22 0737   Periwound dry;intact 04/24/22 2000   Periwound Temperature warm 04/25/22 0737   Periwound Skin Turgor soft 04/25/22 0737   Edges irregular 04/25/22 0737   Dressing Care other (see comments) 04/25/22 0737       Wound 04/18/22 2048 Bilateral medial gluteal MASD (Moisture associated skin damage) (Active)   Dressing Appearance open to air 04/24/22 2000   Base blanchable;pink;red 04/25/22 0737   Periwound dry 04/25/22 0737    Care, Wound cleansed with;soap and water 04/24/22 2000   Dressing Care skin barrier agent applied 04/24/22 2000       Wound 04/18/22 2048 Bilateral groin MASD (Moisture associated skin damage) (Active)   Dressing Appearance open to air 04/24/22 2000   Base pink;red 04/25/22 0737   Care, Wound cleansed with;soap and water 04/24/22 2000   Periwound Care topical treatment applied 04/24/22 2000       Wound 04/18/22 2048  medial coccyx Skin Tear (Active)   Closure Adhesive bandage 04/25/22 0737   Base blanchable;red 04/25/22 0737       Wound 04/20/22 1659 Bilateral medial sacral spine Pressure Injury (Active)   Dressing Appearance dry;intact 04/25/22 0737   Base blanchable;dry;pink;red 04/24/22 2000   Periwound intact;dry 04/24/22 2000   Periwound Temperature warm 04/24/22 2000   Periwound Skin Turgor soft 04/24/22 2000   Edges irregular 04/24/22 2000   Care, Wound cleansed with;soap and water 04/24/22 2000   Dressing Care foam 04/24/22 2000   Periwound Care barrier ointment applied 04/24/22 2000     Physical Therapy Education                 Title: PT OT SLP Therapies (In Progress)     Topic: Physical Therapy (Done)     Point: Mobility training (Done)     Learning Progress Summary           Patient Acceptance, E,TB, VU,NR by MS at 4/25/2022 1422    Acceptance, E, NR,VU,DU by  at 4/23/2022 1130    Acceptance, E,TB, VU by MS at 4/21/2022 1102   Significant Other Acceptance, E,TB, VU by MS at 4/21/2022 1102                   Point: Home exercise program (Done)     Learning Progress Summary           Patient Acceptance, E,TB, VU,NR by MS at 4/25/2022 1422    Acceptance, E, NR,VU,DU by  at 4/23/2022 1130    Acceptance, E,TB, VU by MS at 4/21/2022 1102   Significant Other Acceptance, E,TB, VU by MS at 4/21/2022 1102                   Point: Body mechanics (Done)     Learning Progress Summary           Patient Acceptance, E,TB, VU,NR by MS at 4/25/2022 1422    Acceptance, E, NR,JENNIFER,DU by  at 4/23/2022 1130     Acceptance, E,TB, VU by MS at 4/21/2022 1102   Significant Other Acceptance, E,TB, VU by MS at 4/21/2022 1102                   Point: Precautions (Done)     Learning Progress Summary           Patient Acceptance, E,TB, VU,NR by MS at 4/25/2022 1422    Acceptance, E, NR,VU,DU by  at 4/23/2022 1130    Acceptance, E,TB, VU by MS at 4/21/2022 1102   Significant Other Acceptance, E,TB, VU by MS at 4/21/2022 1102                               User Key     Initials Effective Dates Name Provider Type Discipline     06/16/21 -  Sandi Shepard, PT Physical Therapist PT    MS 06/16/21 -  Goldie Abrams PT Physical Therapist PT                PT Recommendation and Plan    Planned Therapy Interventions (PT): bed mobility training, balance training, gait training, home exercise program, postural re-education, patient/family education, ROM (range of motion), strengthening, transfer training  Frequency of Treatment (PT): 90 minutes per session  Anticipated Equipment Needs at Discharge (PT Eval): wheelchair (pending patient progress made, LRAD for R LE)                  Time Calculation:      PT Charges     Row Name 04/25/22 1409             Time Calculation    Start Time 0800  -MS      Stop Time 0900  -MS      Time Calculation (min) 60 min  -MS      PT Received On 04/25/22  -MS      PT - Next Appointment 04/26/22  -MS            User Key  (r) = Recorded By, (t) = Taken By, (c) = Cosigned By    Initials Name Provider Type    MS Goldie Abrams, PT Physical Therapist                Therapy Charges for Today     Code Description Service Date Service Provider Modifiers Qty    85669031653 HC PT THER PROC EA 15 MIN 4/25/2022 Goldie Abrams, PT GP 3    30710638292 HC PT THERAPEUTIC ACT EA 15 MIN 4/25/2022 Goldie Abrams, PT GP 2    08997028345 HC GAIT TRAINING EA 15 MIN 4/25/2022 Goldie Abrams, PT GP 1                   Goldie Abrams, PT  4/25/2022

## 2022-04-25 NOTE — THERAPY TREATMENT NOTE
Inpatient Rehabilitation - Occupational Therapy Treatment Note    [facility]     Patient Name: Francisco Sequeira  : 1935  MRN: [id number]    Today's Date: 2022                 Admit Date: 2022         ICD-10-CM ICD-9-CM   1. 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)   • Iron deficiency anemia   • Chronic anticoagulation   • Hemiparesis of left nondominant side as late effect of cerebral infarction (HCC)   • Rectus sheath hematoma   • Sepsis (HCC)   • Calculus of gallbladder with acute cholecystitis without obstruction   • History of Clostridium difficile infection   • Aspiration pneumonitis (HCC)   • Hematoma of iliopsoas muscle, left, initial encounter   • History of CVA (cerebrovascular accident)   • S/P laparoscopic cholecystectomy   • Anemia   • Hematoma of left iliopsoas muscle       Past Medical History:   Diagnosis Date   • Allergic rhinitis    • Aortic valve insufficiency    • Ascending aortic aneurysm (HCC)    • Atrial fibrillation (HCC)    • Bacteremia    • Calcific aortic stenosis of bicuspid valve    • Cardiac arrest (HCC)    • Cardiomyopathy (HCC)    • CKD (chronic kidney disease) stage 3, GFR 30-59 ml/min (HCC)    • Contact dermatitis due to poison ivy    • Elbow fracture    • Esophageal reflux    • GERD (gastroesophageal reflux disease)    • Head injury    • History of transfusion    • Hyperglycemia    • Hyperlipidemia    • Hypertension    • Kidney carcinoma (HCC)    • Nonischemic  cardiomyopathy (HCC)    • Renal oncocytoma    • Seasonal allergic reaction    • Sinusitis    • Syncope    • Type 2 diabetes mellitus (HCC)     uncontrolled   • Visual field defect        Past Surgical History:   Procedure Laterality Date   • AORTIC VALVE REPAIR/REPLACEMENT     • ASCENDING AORTIC ANEURYSM REPAIR W/ MECHANICAL AORTIC VALVE REPLACEMENT     • CHOLECYSTECTOMY WITH INTRAOPERATIVE CHOLANGIOGRAM N/A 3/29/2022    Procedure: Laparoscopic cholecystectomy with cholangiogram, possible open;  Surgeon: Lisa Guidry MD;  Location: [facility] MAIN OR;  Service: General;  Laterality: N/A;   • COLONOSCOPY N/A 10/28/2021    Procedure: COLONOSCOPY to cecum:  cold snare polyps,;  Surgeon: Dinesh Meza MD;  Location: [facility] ENDOSCOPY;  Service: Gastroenterology;  Laterality: N/A;  pre:  Iron deficiency anemia  post:  polyps, diverticulosis,    • COLONOSCOPY N/A 11/9/2021    Procedure: COLONOSCOPY to cecum with APC cautery of AVM and clip placement x1;  Surgeon: Pavan Rodriguez MD;  Location: [facility] ENDOSCOPY;  Service: Gastroenterology;  Laterality: N/A;  PRE - gi bleed, anemia  POST - diverticulosis, poor prep, AVM right colon   • ENDOSCOPY N/A 10/28/2021    Procedure: ESOPHAGOGASTRODUODENOSCOPY with biopsies;  Surgeon: Dinesh Meza MD;  Location: [facility] ENDOSCOPY;  Service: Gastroenterology;  Laterality: N/A;  pre:  Iron deficiency anemia  post:  duodenitis and gastritis   • EYE SURGERY  12/09/2020    cataract surgery    • NEPHRECTOMY     • OTHER SURGICAL HISTORY      elbow surgery   • OTHER SURGICAL HISTORY      right arm surgery   • PROSTATE SURGERY     • THORACENTESIS Left     diagnostic             IRF OT ASSESSMENT FLOWSHEET (last 12 hours)     IRF OT Evaluation and Treatment     Row Name 04/25/22 1203          OT Time and Intention    Document Type daily treatment  -DN     Mode of Treatment occupational therapy  -DN     Patient Effort adequate  -DN     Symptoms Noted During/After Treatment  fatigue  -DN     Comment, Evaluation/Treatment Not Performed pt declined adls states nsg cleaned mid-section this and wife assisted yesterday  -DN     Row Name 04/25/22 1207          Grooming    [place] Level (Grooming) grooming skills;oral care regimen;supervision;verbal cues  -DN     Position (Grooming) supported sitting  -DN     Row Name 04/25/22 1207          Transfers    Chair-Bed [place] (Transfers) minimum assist (75% patient effort)  -DN     Row Name 04/25/22 1207          Chair-Bed Transfer    Assistive Device (Chair-Bed Transfers) wheelchair  -DN     Comment, (Chair-Bed Transfer) squat pivot w/c to bed  -DN     Row Name 04/25/22 1207          Shoulder (Therapeutic Exercise)    Shoulder (Therapeutic Exercise) strengthening exercise  -DN     Shoulder AROM (Therapeutic Exercise) 10 repetitions;3 sets  -DN     Shoulder Strengthening (Therapeutic Exercise) 2 lb free weight  dowel tesha and yellow theraputty exercises foro horizontal AB duction and chest press  -DN     Row Name 04/25/22 1207          Elbow/Forearm (Therapeutic Exercise)    Elbow/Forearm (Therapeutic Exercise) strengthening exercise  -DN     Elbow/Forearm AROM (Therapeutic Exercise) bilateral;10 repetitions;3 sets  -DN     Elbow/Forearm Strengthening (Therapeutic Exercise) 2 lb free weight  -DN     Row Name 04/25/22 1207          Wrist (Therapeutic Exercise)    Wrist (Therapeutic Exercise) strengthening exercise  -DN     Wrist AROM (Therapeutic Exercise) bilateral  -DN     Wrist Strengthening (Therapeutic Exercise) 2 lb free weight  -DN     Row Name 04/25/22 1207          Hand (Therapeutic Exercise)    Hand (Therapeutic Exercise) strengthening exercise  -DN     Hand Strengthening (Therapeutic Exercise) bilateral;hand gripper  -DN     Row Name 04/25/22 1207          Balance    Static Standing Balance contact guard;verbal cues  standing from w/c to add cushion to current seat  -DN     Row Name 04/25/22 1207          Positioning and  Restraints    Pre-Treatment Position in bed  -DN     Post Treatment Position bed  -DN     In Bed sitting  -DN           User Key  (r) = Recorded By, (t) = Taken By, (c) = Cosigned By    Initials Name Effective Dates    Francisco Batista OT 06/16/21 -                  Occupational Therapy Education                 Title: PT OT SLP Therapies (In Progress)     Topic: Occupational Therapy (Not Started)     Point: ADL training (Not Started)     Description:   Instruct learner(s) on proper safety adaptation and remediation techniques during self care or transfers.   Instruct in proper use of assistive devices.              Learner Progress:  Not documented in this visit.          Point: Home exercise program (Not Started)     Description:   Instruct learner(s) on appropriate technique for monitoring, assisting and/or progressing therapeutic exercises/activities.              Learner Progress:  Not documented in this visit.          Point: Precautions (Not Started)     Description:   Instruct learner(s) on prescribed precautions during self-care and functional transfers.              Learner Progress:  Not documented in this visit.          Point: Body mechanics (Not Started)     Description:   Instruct learner(s) on proper positioning and spine alignment during self-care, functional mobility activities and/or exercises.              Learner Progress:  Not documented in this visit.                                OT Recommendation and Plan    Planned Therapy Interventions (OT): activity tolerance training, adaptive equipment training, BADL retraining, ROM/therapeutic exercise, strengthening exercise, transfer/mobility retraining                    Time Calculation:      Time Calculation- OT     Row Name 04/25/22 0930             Time Calculation- OT    OT Start Time 0930  -DN      OT Stop Time 1030  -DN      OT Time Calculation (min) 60 min  -DN            User Key  (r) = Recorded By, (t) = Taken By, (c) = Cosigned By     Initials Name Provider Type    DN Francisco Jane OT Occupational Therapist              Therapy Charges for Today     Code Description Service Date Service Provider Modifiers Qty    34143999440 HC OT SELF CARE/MGMT/TRAIN EA 15 MIN 4/25/2022 Francisco Jane OT GO 1    26471809791 HC OT THER PROC EA 15 MIN 4/25/2022 Francisco Jane OT GO 3                   Francisco Jane OT  4/25/2022

## 2022-04-25 NOTE — PROGRESS NOTES
[facility] Clinical Pharmacy Services: Warfarin Dosing/Monitoring Consult    Francisco Sequeira is a 86 y.o. male, estimated creatinine clearance is 35.5 mL/min (A) (by C-G formula based on SCr of 1.32 mg/dL (H)). weighing 60.2 kg (132 lb 11.5 oz).    Results from last 7 days   Lab Units 04/25/22  1037 04/24/22  0433 04/23/22  0758 04/23/22  0732 04/22/22  1950 04/22/22  0926 04/21/22  0821   INR  3.23* 3.27*  --  2.69* 2.63* 2.52* 2.92*   HEMOGLOBIN g/dL 10.3* 9.2* 9.9*  --   --  10.4* 9.2*   HEMATOCRIT % 33.6* 30.8* 31.8*  --   --  33.9* 30.4*   PLATELETS 10*3/mm3 265 208 196  --   --  263 213     Prior to admission anticoagulation: Per [facility] clinic visit note - pt takes warfarin 7.5 mg every Mon, Wed, Fri; 5 mg all other days    Hospital Anticoagulation:  Consulting provider: Dr. Andrade  Start date: 4/20/22  Indication: Mechanical valve  Target INR:  3-3.5  Expected duration: tbd   Bridge Therapy: no. Lovenox stopped on 4/21.    Potential food or drug interactions: none currently    Education complete?/Date: Home medication    Assessment/Plan:  INR therapeutic at 3.23 today. Will resume patient's home warfarin regimen of 7.5 mg every Mon, Wed, Fri; 5 mg all other days.    Monitor for any signs or symptoms of bleeding  Follow up daily INRs and dose adjustments.     Pharmacy will continue to follow until discharge or discontinuation of warfarin.     Keyanna Saenz, PharmD  Clinical Pharmacist

## 2022-04-26 LAB
DEPRECATED RDW RBC AUTO: 45.1 FL (ref 37–54)
ERYTHROCYTE [DISTWIDTH] IN BLOOD BY AUTOMATED COUNT: 15.1 % (ref 12.3–15.4)
GLUCOSE BLDC GLUCOMTR-MCNC: 100 MG/DL (ref 70–130)
GLUCOSE BLDC GLUCOMTR-MCNC: 119 MG/DL (ref 70–130)
GLUCOSE BLDC GLUCOMTR-MCNC: 201 MG/DL (ref 70–130)
GLUCOSE BLDC GLUCOMTR-MCNC: 221 MG/DL (ref 70–130)
HCT VFR BLD AUTO: 30.7 % (ref 37.5–51)
HGB BLD-MCNC: 9.6 G/DL (ref 13–17.7)
INR PPP: 3.46 (ref 0.9–1.1)
MCH RBC QN AUTO: 26.2 PG (ref 26.6–33)
MCHC RBC AUTO-ENTMCNC: 31.3 G/DL (ref 31.5–35.7)
MCV RBC AUTO: 83.7 FL (ref 79–97)
PLATELET # BLD AUTO: 209 10*3/MM3 (ref 140–450)
PMV BLD AUTO: 11.1 FL (ref 6–12)
PROTHROMBIN TIME: 35.1 SECONDS (ref 11.7–14.2)
RBC # BLD AUTO: 3.67 10*6/MM3 (ref 4.14–5.8)
WBC NRBC COR # BLD: 4.01 10*3/MM3 (ref 3.4–10.8)

## 2022-04-26 PROCEDURE — 63710000001 INSULIN LISPRO (HUMAN) PER 5 UNITS: Performed by: PHYSICAL MEDICINE & REHABILITATION

## 2022-04-26 PROCEDURE — 85027 COMPLETE CBC AUTOMATED: CPT | Performed by: PHYSICAL MEDICINE & REHABILITATION

## 2022-04-26 PROCEDURE — 97110 THERAPEUTIC EXERCISES: CPT

## 2022-04-26 PROCEDURE — 97530 THERAPEUTIC ACTIVITIES: CPT

## 2022-04-26 PROCEDURE — 97535 SELF CARE MNGMENT TRAINING: CPT

## 2022-04-26 PROCEDURE — 85610 PROTHROMBIN TIME: CPT | Performed by: PHYSICAL MEDICINE & REHABILITATION

## 2022-04-26 PROCEDURE — 97116 GAIT TRAINING THERAPY: CPT

## 2022-04-26 PROCEDURE — 82962 GLUCOSE BLOOD TEST: CPT

## 2022-04-26 RX ORDER — MONTELUKAST SODIUM 4 MG/1
TABLET, CHEWABLE ORAL
Qty: 180 TABLET | Refills: 1 | Status: SHIPPED | OUTPATIENT
Start: 2022-04-26 | End: 2022-08-26

## 2022-04-26 RX ADMIN — FERROUS SULFATE TAB 325 MG (65 MG ELEMENTAL FE) 325 MG: 325 (65 FE) TAB at 08:53

## 2022-04-26 RX ADMIN — INSULIN GLARGINE-YFGN 10 UNITS: 100 INJECTION, SOLUTION SUBCUTANEOUS at 20:49

## 2022-04-26 RX ADMIN — MAGNESIUM OXIDE 400 MG (241.3 MG MAGNESIUM) TABLET 400 MG: TABLET at 08:53

## 2022-04-26 RX ADMIN — ATORVASTATIN CALCIUM 40 MG: 20 TABLET, FILM COATED ORAL at 08:53

## 2022-04-26 RX ADMIN — FAMOTIDINE 20 MG: 20 TABLET ORAL at 06:15

## 2022-04-26 RX ADMIN — WARFARIN SODIUM 5 MG: 5 TABLET ORAL at 18:09

## 2022-04-26 RX ADMIN — MUPIROCIN 1 APPLICATION: 20 OINTMENT TOPICAL at 20:52

## 2022-04-26 RX ADMIN — Medication 1 CAPSULE: at 08:53

## 2022-04-26 RX ADMIN — NYSTATIN: 100000 POWDER TOPICAL at 08:54

## 2022-04-26 RX ADMIN — INSULIN LISPRO 2 UNITS: 100 INJECTION, SOLUTION INTRAVENOUS; SUBCUTANEOUS at 12:31

## 2022-04-26 RX ADMIN — MAGNESIUM OXIDE 400 MG (241.3 MG MAGNESIUM) TABLET 400 MG: TABLET at 20:49

## 2022-04-26 RX ADMIN — DIGOXIN 62.5 MCG: 125 TABLET ORAL at 12:32

## 2022-04-26 RX ADMIN — MUPIROCIN 1 APPLICATION: 20 OINTMENT TOPICAL at 14:30

## 2022-04-26 RX ADMIN — METOPROLOL SUCCINATE 12.5 MG: 25 TABLET, EXTENDED RELEASE ORAL at 08:53

## 2022-04-26 RX ADMIN — MUPIROCIN 1 APPLICATION: 20 OINTMENT TOPICAL at 08:53

## 2022-04-26 RX ADMIN — FAMOTIDINE 20 MG: 20 TABLET ORAL at 16:07

## 2022-04-26 RX ADMIN — NYSTATIN 1 APPLICATION: 100000 POWDER TOPICAL at 20:51

## 2022-04-26 NOTE — PROGRESS NOTES
Inpatient Rehabilitation Plan of Care Note    Plan of Care  Care Plan Reviewed - No updates at this time.    Psychosocial    Performed Intervention(s)  Provide distraction and/or relaxation as needed.  Allow extra time for patient to verbalize needs, feelings, concerns, etc.      Safety    Performed Intervention(s)  Hourly rounding.  Fall precautions: bed low and locked, patient belongings adn call light w/in  reach, nonskid socks and gait belt in use, bed and chair alarms in use.      Sphincter Control    Performed Intervention(s)  Monitor I/O.  Toileting schedule while awake.  Assist with urinal as needed, use incontinence products as needed.      Body Systems    Performed Intervention(s)  barrier cream and powder, as ordered.  Skin assessment q shift and prn  Low air loss bed (waffle overlay not available per cpd).  Turn q2h or encourage turning.    Signed by: Chantal Matt RN

## 2022-04-26 NOTE — THERAPY TREATMENT NOTE
Inpatient Rehabilitation - Physical Therapy Treatment Note       [facility]     Patient Name: Francisco Sequeira  : 1935  MRN: [id number]    Today's Date: 2022                    Admit Date: 2022      Visit Dx:     ICD-10-CM ICD-9-CM   1. 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)   • Iron deficiency anemia   • Chronic anticoagulation   • Hemiparesis of left nondominant side as late effect of cerebral infarction (HCC)   • Rectus sheath hematoma   • Sepsis (HCC)   • Calculus of gallbladder with acute cholecystitis without obstruction   • History of Clostridium difficile infection   • Aspiration pneumonitis (HCC)   • Hematoma of iliopsoas muscle, left, initial encounter   • History of CVA (cerebrovascular accident)   • S/P laparoscopic cholecystectomy   • Anemia   • Hematoma of left iliopsoas muscle       Past Medical History:   Diagnosis Date   • Allergic rhinitis    • Aortic valve insufficiency    • Ascending aortic aneurysm (HCC)    • Atrial fibrillation (HCC)    • Bacteremia    • Calcific aortic stenosis of bicuspid valve    • Cardiac arrest (HCC)    • Cardiomyopathy (HCC)    • CKD (chronic kidney disease) stage 3, GFR 30-59 ml/min (HCC)    • Contact dermatitis due to poison ivy    • Elbow fracture    • Esophageal reflux    • GERD (gastroesophageal reflux disease)    • Head injury    • History of transfusion    • Hyperglycemia    • Hyperlipidemia    • Hypertension    • Kidney carcinoma (HCC)    •  Nonischemic cardiomyopathy (HCC)    • Renal oncocytoma    • Seasonal allergic reaction    • Sinusitis    • Syncope    • Type 2 diabetes mellitus (HCC)     uncontrolled   • Visual field defect        Past Surgical History:   Procedure Laterality Date   • AORTIC VALVE REPAIR/REPLACEMENT     • ASCENDING AORTIC ANEURYSM REPAIR W/ MECHANICAL AORTIC VALVE REPLACEMENT     • CHOLECYSTECTOMY WITH INTRAOPERATIVE CHOLANGIOGRAM N/A 3/29/2022    Procedure: Laparoscopic cholecystectomy with cholangiogram, possible open;  Surgeon: Lisa Guidry MD;  Location: [facility] MAIN OR;  Service: General;  Laterality: N/A;   • COLONOSCOPY N/A 10/28/2021    Procedure: COLONOSCOPY to cecum:  cold snare polyps,;  Surgeon: Dinesh Meza MD;  Location: [facility] ENDOSCOPY;  Service: Gastroenterology;  Laterality: N/A;  pre:  Iron deficiency anemia  post:  polyps, diverticulosis,    • COLONOSCOPY N/A 11/9/2021    Procedure: COLONOSCOPY to cecum with APC cautery of AVM and clip placement x1;  Surgeon: Pavan Rodriguez MD;  [facility] END[facility]COPY;  Service: Gastroenterology;  Laterality: N/A;  PRE - gi bleed, anemia  POST - diverticulosis, poor prep, AVM right colon   • ENDOSCOPY N/A 10/28/2021    Procedure: ESOPHAGOGASTRODUODENOSCOPY with biopsies;  Surgeon: Dinesh Meza MD;  Location: [facility] ENDOSCOPY;  Service: Gastroenterology;  Laterality: N/A;  pre:  Iron deficiency anemia  post:  duodenitis and gastritis   • EYE SURGERY  12/09/2020    cataract surgery    • NEPHRECTOMY     • OTHER SURGICAL HISTORY      elbow surgery   • OTHER SURGICAL HISTORY      right arm surgery   • PROSTATE SURGERY     • THORACENTESIS Left     diagnostic       PT ASSESSMENT (last 12 hours)     IRF PT Evaluation and Treatment     Row Name 04/26/22 1121          PT Time and Intention    Document Type daily treatment  -MS     Mode of Treatment physical therapy  -MS     Patient/Family/Caregiver Comments/Observations AM and PM: sitting up in wc, NAD,  "exit alarm on  -MS     Total Minutes, Physical Therapy 90  -MS     Row Name 04/26/22 1121          General Information    Existing Precautions/Restrictions fall  -MS     Limitations/Impairments safety/cognitive  -MS     Comment, General Information Spoke with Dr. Andrade and zofia'd trialing KI with gait.  -MS     Row Name 04/26/22 1121          Pain Assessment    Pretreatment Pain Rating 0/10 - no pain  -MS     Posttreatment Pain Rating 0/10 - no pain  -MS     Pre/Posttreatment Pain Comment \"Grunts\" but no report of pain with therapy session.  -MS     Row Name 04/26/22 1121          Cognition/Psychosocial    Orientation Status (Cognition) oriented x 4  -MS     Follows Commands (Cognition) follows three-step commands;verbal cues/prompting required;repetition of directions required;physical/tactile prompts required;increased processing time needed  -MS     Personal Safety Interventions fall prevention program maintained;gait belt;nonskid shoes/slippers when out of bed  -MS     Row Name 04/26/22 1121          Bed Mobility    Supine-Sit [place] (Bed Mobility) minimum assist (75% patient effort);verbal cues  -MS     Sit-Supine [place] (Bed Mobility) minimum assist (75% patient effort);verbal cues  -MS     Comment, (Bed Mobility) Bed mobility performed on therapy gym mat. Instructed on leg lifting technique.  -MS     Row Name 04/26/22 1121          Transfers    Bed-Chair [place] (Transfers) minimum assist (75% patient effort);verbal cues;nonverbal cues (demo/gesture)  -MS     Chair-Bed [place] (Transfers) minimum assist (75% patient effort);verbal cues;nonverbal cues (demo/gesture)  -MS     Assistive Device (Bed-Chair Transfers) wheelchair  -MS     Sit-Stand [place] (Transfers) minimum assist (75% patient effort);moderate assist (50% patient effort);verbal cues;nonverbal cues (demo/gesture)  -MS     Stand-Sit [place] (Transfers) minimum assist (75% patient effort);moderate assist (50% patient " effort);verbal cues;nonverbal cues (demo/gesture)  -MS     Row Name 04/26/22 1121          Bed-Chair Transfer    Comment, (Bed-Chair Transfer) squat pivot  -MS     Row Name 04/26/22 1121          Chair-Bed Transfer    Assistive Device (Chair-Bed Transfers) wheelchair  -MS     Comment, (Chair-Bed Transfer) squat pivot  -MS     Row Name 04/26/22 1121          Sit-Stand Transfer    Assistive Device (Sit-Stand Transfers) wheelchair  -MS     Row Name 04/26/22 1121          Stand-Sit Transfer    Assistive Device (Stand-Sit Transfers) wheelchair  -MS     Row Name 04/26/22 1121          Gait/Stairs (Locomotion)    [place] Level (Gait) minimum assist (75% patient effort);2 person assist;verbal cues;nonverbal cues (demo/gesture)  third person following with wc  -MS     Assistive Device (Gait) walker, front-wheeled  -MS     Distance in Feet (Gait) 35' x 2 trials, 40'  -MS     Pattern (Gait) step-to  -MS     Deviations/Abnormal Patterns (Gait) raul decreased;left sided deviations;stride length decreased  -MS     Bilateral Gait Deviations forward flexed posture  -MS     Left Sided Gait Deviations knee buckling, left side;heel strike decreased;weight shift ability decreased  -MS     Comment, (Gait/Stairs) Donned KI for gait today, mild knee buckling noted but KI able to provide support, sequencing cues throughout, fatigue limiting overall  -MS     Row Name 04/26/22 1121          Balance    Static Sitting Balance supervision  -MS     Dynamic Sitting Balance standby assist  -MS     Position, Sitting Balance sitting in chair  -MS     Sit to Stand Dynamic Balance contact guard;verbal cues  -MS     Static Standing Balance contact guard;verbal cues  -MS     Dynamic Standing Balance minimal assist;moderate assist;verbal cues  -MS     Position/Device Used, Standing Balance parallel bars  -MS     Row Name 04/26/22 1121          Hip (Therapeutic Exercise)    Hip (Therapeutic Exercise) PROM (passive range of motion);isometric  exercises  -MS     Hip PROM (Therapeutic Exercise) left;extension;sidelying;3 repetitions;10 second hold  -MS     Hip Isometrics (Therapeutic Exercise) bilateral;gluteal sets;10 repetitions;3 sets  -MS     Row Name 04/26/22 1121          Knee (Therapeutic Exercise)    Knee Strengthening (Therapeutic Exercise) --  Attempted TE of knee- unable to get comfortable in supine due to posture likely. LBP limiting.  -MS     Row Name 04/26/22 1121          Positioning and Restraints    Pre-Treatment Position sitting in chair/recliner  -MS     Post Treatment Position wheelchair  -MS     In Wheelchair sitting;call light within reach;encouraged to call for assist;exit alarm on  AM and PM session  -MS           User Key  (r) = Recorded By, (t) = Taken By, (c) = Cosigned By    Initials Name Provider Type    MS AbramsGoldie, PT Physical Therapist              Wound 03/29/22 1858 abdomen Incision (Active)   Drainage Amount none 04/26/22 0853   Dressing Care open to air 04/26/22 0853       Wound 04/15/22 0352 Left posterior cervical spine Abrasion (Active)   Dressing Appearance dry;intact 04/25/22 2030       Wound 04/18/22 2048 Bilateral medial gluteal MASD (Moisture associated skin damage) (Active)   Dressing Care dressing changed 04/26/22 1010       Wound 04/18/22 2048  medial coccyx Skin Tear (Active)   Dressing Care dressing changed 04/26/22 0853       Wound 04/20/22 1659 Bilateral medial sacral spine Pressure Injury (Active)   Dressing Appearance dry;intact 04/25/22 2030   Dressing Care dressing changed;dressing moistened 04/26/22 1010     Physical Therapy Education                 Title: PT OT SLP Therapies (In Progress)     Topic: Physical Therapy (Done)     Point: Mobility training (Done)     Learning Progress Summary           Patient Acceptance, E,TB, VU,NR by MS at 4/26/2022 1458    Acceptance, E,TB, VU,NR by MS at 4/25/2022 1422    Acceptance, E, NR,VU,DU by  at 4/23/2022 1130    Acceptance, E,TB, VU by MS at  4/21/2022 1102   Significant Other Acceptance, E,TB, VU by MS at 4/21/2022 1102                   Point: Home exercise program (Done)     Learning Progress Summary           Patient Acceptance, E,TB, VU,NR by MS at 4/26/2022 1458    Acceptance, E,TB, VU,NR by MS at 4/25/2022 1422    Acceptance, E, NR,VU,DU by  at 4/23/2022 1130    Acceptance, E,TB, VU by MS at 4/21/2022 1102   Significant Other Acceptance, E,TB, VU by MS at 4/21/2022 1102                   Point: Body mechanics (Done)     Learning Progress Summary           Patient Acceptance, E,TB, VU,NR by MS at 4/26/2022 1458    Acceptance, E,TB, VU,NR by MS at 4/25/2022 1422    Acceptance, E, NR,VU,DU by  at 4/23/2022 1130    Acceptance, E,TB, VU by MS at 4/21/2022 1102   Significant Other Acceptance, E,TB, VU by MS at 4/21/2022 1102                   Point: Precautions (Done)     Learning Progress Summary           Patient Acceptance, E,TB, VU,NR by MS at 4/26/2022 1458    Acceptance, E,TB, VU,NR by MS at 4/25/2022 1422    Acceptance, E, NR,VU,DU by  at 4/23/2022 1130    Acceptance, E,TB, VU by MS at 4/21/2022 1102   Significant Other Acceptance, E,TB, VU by MS at 4/21/2022 1102                               User Key     Initials Effective Dates Name Provider Type Discipline     06/16/21 -  Sandi Shepard, PT Physical Therapist PT    MS 06/16/21 -  Goldie Abrams PT Physical Therapist PT                PT Recommendation and Plan    Planned Therapy Interventions (PT): bed mobility training, balance training, gait training, home exercise program, postural re-education, patient/family education, ROM (range of motion), strengthening, transfer training  Frequency of Treatment (PT): 90 minutes per session  Anticipated Equipment Needs at Discharge (PT Eval): wheelchair (pending patient progress made, LRAD for R LE)                  Time Calculation:      PT Charges     Row Name 04/26/22 1450 04/26/22 1121          Time Calculation    Start Time 1300  -MS  1100  -MS     Stop Time 1400  -MS 1130  -MS     Time Calculation (min) 60 min  -MS 30 min  -MS     PT Received On -- 04/26/22  -MS     PT - Next Appointment -- 04/27/22  -MS           User Key  (r) = Recorded By, (t) = Taken By, (c) = Cosigned By    Initials Name Provider Type    MS AbramsGoldie, PT Physical Therapist                Therapy Charges for Today     Code Description Service Date Service Provider Modifiers Qty    38712365581 HC PT THER PROC EA 15 MIN 4/25/2022 AbramsGoldie, PT GP 3    [id number]  PT THERAPEUTIC ACT EA 15 MIN 4/25/2022 AbramsGoldie, PT GP 2    37597691944 HC GAIT TRAINING EA 15 MIN 4/25/2022 AbramsGoldie, PT GP 1    [id number]  PT THER PROC EA 15 MIN 4/26/2022 AbramsGoldie, PT GP 4    [id number]  PT THERAPEUTIC ACT EA 15 MIN 4/26/2022 AbramsGoldie, PT GP 1    68566415612 HC GAIT TRAINING EA 15 MIN 4/26/2022 AbramsGoldie, PT GP 1    [id number]  PT THER SUPP EA 15 MIN 4/26/2022 Goldie Abrams, PT GP 3                   Goldie Abrams, PT  4/26/2022

## 2022-04-26 NOTE — PLAN OF CARE
Goal Outcome Evaluation:  Plan of Care Reviewed With: patient           Outcome Evaluation: Patient has been pleasant and cooperative, alert and oriented x4, and assist x2. He takes medication with applesauce, continent and incontinent and accucheck AC/HS- coverage needed at lunch- wife only wanted to give 2 units instead of the full 4 units. Blood sugar good at dinner, no complaints of pain and no unsafe behavior. Dressing changed to coccyx and back after shower with therapy this morning. No other issues at this time, and wife at bedside- very supportive and helpful with patient.

## 2022-04-26 NOTE — PROGRESS NOTES
Case Management  Inpatient Rehabilitation Team Conference    Conference Date/Time: 4/26/2022 8:33:19 AM    Team Conference Attendees:  Pura Brown, PAULINE Abrams, PT  Francisco Jane, OT  Whit Saravia, CTRS  Shanon Chase RD, LD  Ana Barfield, VITALIY Matt, RN  Chaplain Jovani    Demographics            Age: 86Y            Gender: Male    Admission Date: 4/20/2022 4:27:00 PM  Rehabilitation Diagnosis:  Debility  Past Medical History: Past Medical History:  DiagnosisDate  ?Allergic rhinitis?  ?Aortic valve insufficiency?  ?Ascending aortic aneurysm (HCC)?  ?Atrial fibrillation (HCC)?  ?Bacteremia?  ?Calcific aortic stenosis of bicuspid valve?  ?Cardiac arrest (HCC)?  ?Cardiomyopathy (HCC)?  ?CKD (chronic kidney disease) stage 3, GFR 30-59 ml/min (HCC)?  ?Contact dermatitis due to poison ivy?  ?Elbow fracture?  ?Esophageal reflux?  ?GERD (gastroesophageal reflux disease)?  ?Head injury?  ?History of transfusion?  ?Hyperglycemia?  ?Hyperlipidemia?  ?Hypertension?  ?Kidney carcinoma (HCC)?  ?Nonischemic cardiomyopathy (HCC)?  ?Renal oncocytoma?  ?Seasonal allergic reaction?  ?Sinusitis?  ?Syncope?  ?Type 2 diabetes mellitus (HCC)?  ?uncontrolled  ?Visual field defect?    ?  ?  Surgical HistoryExpand by Default  Past Surgical History:  ProcedureLateralityDate  ?AORTIC VALVE REPAIR/REPLACEMENT??  ?ASCENDING AORTIC ANEURYSM REPAIR W/ MECHANICAL AORTIC VALVE REPLACEMENT??  ?CHOLECYSTECTOMY WITH INTRAOPERATIVE CHOLANGIOGRAMN/A3/29/2022  ?Procedure: Laparoscopic cholecystectomy with cholangiogram, possible open;  Surgeon: Lisa Guidry MD;  Location: [facility] MAIN OR;  Service: General;  Laterality: N/A;  ?COLONOSCOPYN/A10/28/2021  ?Procedure: COLONOSCOPY to cecum:  cold snare polyps,;  Surgeon: Dinesh Meza MD;  Location: [facility] ENDOSCOPY;  Service: Gastroenterology;  Laterality: N/A;  pre:  Iron deficiency anemia  post:  polyps,  diverticulosis,  ?COLONOSCOPYN/A11/9/2021  ?Procedure: COLONOSCOPY to cecum with APC cautery of AVM and clip placement x1;   Surgeon: Pavan Rodriguez MD;  Location: [facility] ENDOSCOPY;  Service:  Gastroenterology;  Laterality: N/A;  PRE - gi bleed, anemia  POST - diverticulosis, poor prep, AVM right colon  ?ENDOSCOPYN/A10/28/2021  ?Procedure: ESOPHAGOGASTRODUODENOSCOPY with biopsies;  Surgeon: Dinesh Meza MD;  Location: [facility] ENDOSCOPY;  Service: Gastroenterology;  Laterality:  N/A;  pre:  Iron deficiency anemia  post:  duodenitis and gastritis  ?EYE SURGERY?12/09/2020  ?cataract surgery  ?NEPHRECTOMY??  ?OTHER SURGICAL HISTORY??  ?elbow surgery  ?OTHER SURGICAL HISTORY??  ?right arm surgery  ?PROSTATE SURGERY??  ?THORACENTESISLeft?  ?diagnostic    ?      Plan of Care  Anticipated Discharge Date/Estimated Length of Stay: ELOS: 2 weeks  Anticipated Discharge Destination: Community discharge with assistance  Discharge Plan : Patient lives with wife in one story home; 2 step entry with  rail.  D/C plan is home with wife. Wife works but will plan to work from home so can  assist patient. Patient was current with [facility].  Medical Necessity Expected Level Rationale: Goals are to achieve a level of  contact-guard assist with  mobility and self-care and improved strength.  Rehabilitation prognosis indeterminate.  Medical prognosis indeterminate.  Intensity and Duration: an average of 3 hours/5 days per week  Medical Supervision and 24 Hour Rehab Nursing: x  Physical Therapy: x  PT Intensity/Duration: 1.5 hours/day, 5 days/week for approximately 10 days  Occupational Therapy: x  OT Intensity/Duration: 1.5 hours/day, 5 days/week for approximately 10 days  Social Work: x  Therapeutic Recreation: x  Updated (if changes indicated)    Anticipated Discharge Date/Estimated Length of Stay:   ELOS: 3 weeks    Based on the patient's medical and functional status, their prognosis and  expected level of  functional improvement is: Goals are to achieve a level of  contact-guard assist with  mobility and self-care and improved strength.  Rehabilitation prognosis indeterminate.  Medical prognosis indeterminate.      Interdisciplinary Problem/Goals/Status    All Rehab Problems:  Body Systems    [RN] Integumentary(Active)  Current Status(04/26/2022): Pt has blanchable redness to coccyx with dry,  peeling skin to coccyx and buttocks. No open area noted. Excoriation to groin.  Scattered bruising. 3 glued lap sites to abd. Pt able to turn in bed.  Weekly Goal(05/03/2022): Skin will remain free from s/s infection.  Discharge Goal: Skin will remain free from further breakdown.        Mobility    [OT] Toilet Transfers(Active)  Current Status(04/26/2022): Mod squat pivot  Weekly Goal(05/05/2022): Min squat pivot  Discharge Goal: CGA    [PT] Stairs(Active)  Current Status(04/25/2022): TBD  Weekly Goal(05/02/2022): PT only  Discharge Goal: 2 steps w HR, CGA    [PT] Walk(Active)  Current Status(04/25/2022): 14' modAx2, WC following, L knee block  Weekly Goal(05/02/2022): PT only  Discharge Goal: 25', LRAD    [PT] Bed/Chair/Wheelchair(Active)  Current Status(04/25/2022): modA, squat pivot  Weekly Goal(05/02/2022): [place], squat pivot  Discharge Goal: SV, LRAD    [PT] Bed Mobility(Active)  Current Status(04/25/2022): SBA  Weekly Goal(05/02/2022): SV  Discharge Goal: SV        Psychosocial    [RN] Coping/Adjustment(Active)  Current Status(04/26/2022): Patient is at risk for reduced coping r/t recent  hospitalization and new mobility issues. Pt is A/Ox4 but can be forgetful. Pt  has a supportive spouse.  Weekly Goal(05/03/2022): Pt will verbalize needs, concerns, feelings to staff as  needed.  Discharge Goal: Pt will be able to verbalize adequate coping strategies he can  use at home.        Safety    [RN] Potential for Injury(Active)  Current Status(04/26/2022): Pt is at increased risk for falls/injury r/t recent  hospitalization and  reduced mobility.  Weekly Goal(05/03/2022): Pt will call appropriately for assistance as needed.  Discharge Goal: Pt will remain free from falls/injury.        Self Care    [OT] Bathing(Active)  Current Status(04/26/2022): Mod  Weekly Goal(05/05/2022): min  Discharge Goal: cga    [OT] Dressing (Lower)(Active)  Current Status(04/26/2022): Mod/Max  Weekly Goal(05/05/2022): Mod  Discharge Goal: Min    [OT] Dressing (Upper)(Active)  Current Status(04/26/2022): sba  Weekly Goal(05/05/2022): setup  Discharge Goal: setup    [OT] Grooming(Active)  Current Status(04/26/2022): sba seated  Weekly Goal(04/28/2022): setup  Discharge Goal: Mod Indep    [OT] Toileting(Active)  Current Status(04/26/2022): Max of 1 to 2 persons  Weekly Goal(05/05/2022): Max x 1  Discharge Goal: CGA        Sphincter Control    [RN] Bladder Management(Active)  Current Status(04/26/2022): Pt can be continent and incontinent of urine.  Weekly Goal(05/03/2022): Pt will be 50% continent.  Discharge Goal: Pt will be 75% continent of urine.    [RN] Bowel Management(Active)  Current Status(04/26/2022): Pt is reportedly incontinent of stool.  Weekly Goal(05/03/2022): Pt will be 50% continent.  Discharge Goal: Pt will be continent 75% of the time.        Comments: 4/26 Current with [facility]  before coming to rehab.  Sensation has  returned, Mod A with transfers, left leg needs blocking, endurance is lacking.  Wife concerned about intake.    Signed by: Ana Barfield RN    Physician CoSigned By: Grant Andrade 04/26/2022 08:49:39

## 2022-04-26 NOTE — THERAPY TREATMENT NOTE
Inpatient Rehabilitation - Occupational Therapy Treatment Note    [facility]     Patient Name: Francisco Sequeira  : 1935  MRN: [id number]    Today's Date: 2022                 Admit Date: 2022         ICD-10-CM ICD-9-CM   1. 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)   • Iron deficiency anemia   • Chronic anticoagulation   • Hemiparesis of left nondominant side as late effect of cerebral infarction (HCC)   • Rectus sheath hematoma   • Sepsis (HCC)   • Calculus of gallbladder with acute cholecystitis without obstruction   • History of Clostridium difficile infection   • Aspiration pneumonitis (HCC)   • Hematoma of iliopsoas muscle, left, initial encounter   • History of CVA (cerebrovascular accident)   • S/P laparoscopic cholecystectomy   • Anemia   • Hematoma of left iliopsoas muscle       Past Medical History:   Diagnosis Date   • Allergic rhinitis    • Aortic valve insufficiency    • Ascending aortic aneurysm (HCC)    • Atrial fibrillation (HCC)    • Bacteremia    • Calcific aortic stenosis of bicuspid valve    • Cardiac arrest (HCC)    • Cardiomyopathy (HCC)    • CKD (chronic kidney disease) stage 3, GFR 30-59 ml/min (HCC)    • Contact dermatitis due to poison ivy    • Elbow fracture    • Esophageal reflux    • GERD (gastroesophageal reflux disease)    • Head injury    • History of transfusion    • Hyperglycemia    • Hyperlipidemia    • Hypertension    • Kidney carcinoma (HCC)    • Nonischemic  cardiomyopathy (HCC)    • Renal oncocytoma    • Seasonal allergic reaction    • Sinusitis    • Syncope    • Type 2 diabetes mellitus (HCC)     uncontrolled   • Visual field defect        Past Surgical History:   Procedure Laterality Date   • AORTIC VALVE REPAIR/REPLACEMENT     • ASCENDING AORTIC ANEURYSM REPAIR W/ MECHANICAL AORTIC VALVE REPLACEMENT     • CHOLECYSTECTOMY WITH INTRAOPERATIVE CHOLANGIOGRAM N/A 3/29/2022    Procedure: Laparoscopic cholecystectomy with cholangiogram, possible open;  Surgeon: Lisa Guidry MD;  Location: [facility] MAIN OR;  Service: General;  Laterality: N/A;   • COLONOSCOPY N/A 10/28/2021    Procedure: COLONOSCOPY to cecum:  cold snare polyps,;  Surgeon: Dinesh Meza MD;  Location: [facility] ENDOSCOPY;  Service: Gastroenterology;  Laterality: N/A;  pre:  Iron deficiency anemia  post:  polyps, diverticulosis,    • COLONOSCOPY N/A 11/9/2021    Procedure: COLONOSCOPY to cecum with APC cautery of AVM and clip placement x1;  Surgeon: Pavan Rodriguez MD;  Location: [facility] ENDOSCOPY;  Service: Gastroenterology;  Laterality: N/A;  PRE - gi bleed, anemia  POST - diverticulosis, poor prep, AVM right colon   • ENDOSCOPY N/A 10/28/2021    Procedure: ESOPHAGOGASTRODUODENOSCOPY with biopsies;  Surgeon: Dinesh Meza MD;  Location: [facility] ENDOSCOPY;  Service: Gastroenterology;  Laterality: N/A;  pre:  Iron deficiency anemia  post:  duodenitis and gastritis   • EYE SURGERY  12/09/2020    cataract surgery    • NEPHRECTOMY     • OTHER SURGICAL HISTORY      elbow surgery   • OTHER SURGICAL HISTORY      right arm surgery   • PROSTATE SURGERY     • THORACENTESIS Left     diagnostic             IRF OT ASSESSMENT FLOWSHEET (last 12 hours)     IRF OT Evaluation and Treatment     Row Name 04/26/22 1221          OT Time and Intention    Document Type daily treatment  -DN     Mode of Treatment occupational therapy  -DN     Patient Effort good  -DN     Row Name 04/26/22 1221          Pain  Assessment    Pretreatment Pain Rating 0/10 - no pain  -DN     Posttreatment Pain Rating 0/10 - no pain  -DN     Pain Location - Side/Orientation Left  -DN     Pain Location lower  -DN     Row Name 04/26/22 1221          Cognition/Psychosocial    Affect/Mental Status (Cognition) WFL  -DN     Row Name 04/26/22 1221          Bathing    [place] Level (Bathing) bathing skills;minimum assist (75% patient effort);verbal cues;nonverbal cues (demo/gesture)  -DN     Assistive Device (Bathing) hand held shower spray hose;grab bar/tub rail;tub bench  -DN     Position (Bathing) supported standing;supported sitting  -DN     Comment (Bathing) one person to stand with use of grab bars  -DN     Row Name 04/26/22 1221          Upper Body Dressing    [place] Level (Upper Body Dressing) upper body dressing skills;supervision  -DN     Position (Upper Body Dressing) supported sitting  -DN     Row Name 04/26/22 1221          Lower Body Dressing    [place] Level (Lower Body Dressing) doff;don;pants/bottoms;shoes/slippers;socks;underwear;clothes fastener management;moderate assist (50% patient effort);verbal cues;nonverbal cues (demo/gesture)  -DN     Position (Lower Body Dressing) supported sitting;supported standing  -DN     Set-up Assistance (Lower Body Dressing) obtain clothing  -DN     Comment (Lower Body Dressing) 1 person to stand with pt BUE support on grab bar  -DN     Row Name 04/26/22 1221          Grooming    [place] Level (Grooming) grooming skills;deodorant application;oral care regimen;supervision  -DN     Position (Grooming) sink side;supported sitting  -DN     Row Name 04/26/22 1221          Toileting    [place] Level (Toileting) toileting skills;adjust/manage clothing;moderate assist (50% patient effort);nonverbal cues (demo/gesture);verbal cues  -DN     Assistive Device Use (Toileting) grab bar/safety frame;raised toilet seat  -DN     Position (Toileting) supported sitting;supported standing  -DN      Comment (Toileting) grab bars for standing therapist assist with brief  -DN     Row Name 04/26/22 1221          Transfers    Bed-Chair Mathews (Transfers) minimum assist (75% patient effort);nonverbal cues (demo/gesture)  -DN     Assistive Device (Bed-Chair Transfers) wheelchair  -DN     Row Name 04/26/22 1221          Bed-Chair Transfer    Comment, (Bed-Chair Transfer) squat pivot  -DN     Row Name 04/26/22 1221          Positioning and Restraints    Pre-Treatment Position in bed  -DN     Post Treatment Position wheelchair  -DN     In Bed sitting;call light within reach;encouraged to call for assist;exit alarm on  -DN           User Key  (r) = Recorded By, (t) = Taken By, (c) = Cosigned By    Initials Name Effective Dates    DN Francisco Jane OT 06/16/21 -                  Occupational Therapy Education                 Title: PT OT SLP Therapies (In Progress)     Topic: Occupational Therapy (Not Started)     Point: ADL training (Not Started)     Description:   Instruct learner(s) on proper safety adaptation and remediation techniques during self care or transfers.   Instruct in proper use of assistive devices.              Learner Progress:  Not documented in this visit.          Point: Home exercise program (Not Started)     Description:   Instruct learner(s) on appropriate technique for monitoring, assisting and/or progressing therapeutic exercises/activities.              Learner Progress:  Not documented in this visit.          Point: Precautions (Not Started)     Description:   Instruct learner(s) on prescribed precautions during self-care and functional transfers.              Learner Progress:  Not documented in this visit.          Point: Body mechanics (Not Started)     Description:   Instruct learner(s) on proper positioning and spine alignment during self-care, functional mobility activities and/or exercises.              Learner Progress:  Not documented in this visit.                                 OT Recommendation and Plan    Planned Therapy Interventions (OT): activity tolerance training, adaptive equipment training, BADL retraining, ROM/therapeutic exercise, strengthening exercise, transfer/mobility retraining                    Time Calculation:      Time Calculation- OT     Row Name 04/26/22 0930             Time Calculation- OT    OT Start Time 0930  -DN      OT Stop Time 1030  -DN      OT Time Calculation (min) 60 min  -DN            User Key  (r) = Recorded By, (t) = Taken By, (c) = Cosigned By    Initials Name Provider Type    Francisco Batista OT Occupational Therapist              Therapy Charges for Today     Code Description Service Date Service Provider Modifiers Qty    75436567097 HC OT SELF CARE/MGMT/TRAIN EA 15 MIN 4/25/2022 Francisco Jane OT GO 1    99165356701 HC OT THER PROC EA 15 MIN 4/25/2022 Francisco Jane OT GO 3    06646189552 HC OT THER PROC EA 15 MIN 4/25/2022 Francisco Jane OT GO 2    74372733998 HC OT SELF CARE/MGMT/TRAIN EA 15 MIN 4/26/2022 Francisco Jane OT GO 4                   Francisco Jane OT  4/26/2022

## 2022-04-26 NOTE — PROGRESS NOTES
Inpatient Rehabilitation Functional Measures Assessment and Plan of Care    Plan of Care  Updated Problems/Interventions  Mobility    [OT] Toilet Transfers(Active)  Current Status(04/26/2022): Mod squat pivot  Weekly Goal(05/05/2022): Min squat pivot  Discharge Goal: CGA        Self Care    [OT] Bathing(Active)  Current Status(04/26/2022): Mod  Weekly Goal(05/05/2022): min  Discharge Goal: cga    [OT] Dressing (Lower)(Active)  Current Status(04/26/2022): Mod/Max  Weekly Goal(05/05/2022): Mod  Discharge Goal: Min    [OT] Dressing (Upper)(Active)  Current Status(04/26/2022): sba  Weekly Goal(05/05/2022): setup  Discharge Goal: setup    [OT] Grooming(Active)  Current Status(04/26/2022): sba seated  Weekly Goal(04/28/2022): setup  Discharge Goal: Mod Indep    [OT] Toileting(Active)  Current Status(04/26/2022): Max of 1 to 2 persons  Weekly Goal(05/05/2022): Max x 1  Discharge Goal: CGA    Functional Measures  MEI Eating:  Branch  MEI Grooming: Branch  MEI Bathing:  Branch  MEI Upper Body Dressing:  Branch  MEI Lower Body Dressing:  Branch  MEI Toileting:  Branch    MEI Bladder Management  Level of Assistance:  Branch  Frequency/Number of Accidents this Shift:  Branch    MEI Bowel Management  Level of Assistance: Branch  Frequency/Number of Accidents this Shift: Branch    MEI Bed/Chair/Wheelchair Transfer:  Branch  MEI Toilet Transfer:  Branch  MEI Tub/Shower Transfer:  Branch    Previously Documented Mode of Locomotion at Discharge: Field  MEI Expected Mode of Locomotion at Discharge: Branch  MEI Walk/Wheelchair:  Branch  MEI Stairs:  Branch    MEI Comprehension:  Branch  MEI Expression:  Branch  MEI Social Interaction:  Branch  MEI Problem Solving:  Branch  MEI Memory:  Branch    Therapy Mode Minutes  Occupational Therapy: Branch  Physical Therapy: Branch  Speech Language Pathology:  Branch    Signed by: POLA Johnson/L

## 2022-04-26 NOTE — PROGRESS NOTES
LOS: 6 days   Patient Care Team:  Rubin Hinkle APRN as PCP - General (Family Medicine)  Audra Vazquez RODDY as Pharmacist  Vasquez Niño, PharmD as Pharmacist (Pharmacy)  Tatiana Tinoco MD as Consulting Physician (Gastroenterology)      LAURA XIE  1935    Diagnoses    1. 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  Hypertension.  Impaired mobility/impaired self-care  Chronic kidney disease stage III  Hyperglycemia-Lantus  Anemia-ferrous sulfate  Recent C. difficile colitis-completed vancomycin taper      Subjective   He continues weak proximally in the left lower extremity.  No changes in his strength.  Tolerates activities.  Team conference today.  See report.      Objective     Vitals:    04/26/22 0845   BP: 117/57   Pulse: 69   Resp:    Temp:    SpO2: 100%       PHYSICAL EXAM:      MENTAL STATUS -  AWAKE / ALERT  HEENT- NCAT,   SCLERAE ANICTERIC, CONJUNCTIVAE PINK, OP MOIST, NO JVD, EARS UNREMARKABLE EXTERNALLY  LUNGS -normal respiration  HEART-irregular   Prosthetic click  ABD -soft nontender  EXT - NO EDEMA OR CYANOSIS  NEURO -   MOTOR EXAM - RUE/RLE 5/5. LUE 5/5.    Left hip flexion 2/5, adduction takes resistance, hip extension-active, knee extension 0/5, knee flexion active, ankle dorsiflexion 4/5, EHL 4/5, ankle plantarflexion takes resistance         MEDICATIONS  Scheduled Meds:atorvastatin, 40 mg, Oral, Daily  digoxin, 62.5 mcg, Oral, Daily  famotidine, 20 mg, Oral, BID AC  ferrous sulfate, 325 mg, Oral, Every Other Day  insulin glargine, 10 Units, Subcutaneous, Nightly  insulin  lispro, 0-9 Units, Subcutaneous, TID AC  lactobacillus acidophilus, 1 capsule, Oral, Daily  magnesium oxide, 400 mg, Oral, BID  metoprolol succinate XL, 12.5 mg, Oral, Daily  mupirocin, 1 application, Topical, Q8H  nystatin, , Topical, Q12H  warfarin, 5 mg, Oral, Once per day on Sun Tue Thu Sat  warfarin, 7.5 mg, Oral, Once per day on Mon Wed Fri      Continuous Infusions:Pharmacy to dose warfarin,       PRN Meds:.•  acetaminophen **OR** [DISCONTINUED] acetaminophen **OR** [DISCONTINUED] acetaminophen  •  calcium carbonate  •  dextrose  •  dextrose  •  diphenhydrAMINE  •  glucagon (human recombinant)  •  hydrocortisone-bacitracin-zinc oxide-nystatin  •  nitroglycerin  •  ondansetron **OR** ondansetron  •  Pharmacy to dose warfarin      RESULTS  Glucose   Date/Time Value Ref Range Status   04/26/2022 0623 119 70 - 130 mg/dL Final     Comment:     Meter: [id number] : [id number] [facility]lock MELO   04/25/2022 2035 156 (H) 70 - 130 mg/dL Final     Comment:     Meter: [id number] : [id number] [facility] [place]lock MELO   04/25/2022 1610 111 70 - 130 mg/dL Final     Comment:     Meter: [id number] : [id number] Dian ALEJO   04/25/2022 1103 200 (H) 70 - 130 mg/dL Final     Comment:     Meter: [id number] : [id number] Dian ALEJO   04/25/2022 0632 110 70 - 130 mg/dL Final     Comment:     Meter: [id number] : [id number] [facility] MELO   04/24/2022 2026 130 70 - 130 mg/dL Final     Comment:     Meter: [id number] : [id number] [facility]   04/24/2022 1617 108 70 - 130 mg/dL Final     Comment:     Meter: [id number] : [id number] Dian ALEJO   04/24/2022 1105 216 (H) 70 - 130 mg/dL Final     Comment:     Meter: [id number] : [id number] Dian ALEJO     Results from last 7 days   Lab Units 04/26/22  0749 04/25/22  1037 04/24/22  0433   WBC 10*3/mm3 4.01 5.41 4.60   HEMOGLOBIN g/dL 9.6* 10.3* 9.2*   HEMATOCRIT % 30.7* 33.6* 30.8*   PLATELETS 10*3/mm3 209 265 208     Results from last 7  days   Lab Units 04/25/22  1037 04/22/22  0926 04/20/22  0710   SODIUM mmol/L 134* 135* 138   POTASSIUM mmol/L 4.4 4.6 4.4   CHLORIDE mmol/L 100 101 106   CO2 mmol/L 22.2 24.3 26.7   BUN mg/dL 22 14 16   CREATININE mg/dL 1.32* 1.18 1.12   CALCIUM mg/dL 8.5* 8.6 8.4*   GLUCOSE mg/dL 193* 156* 70       Results from last 7 days   Lab Units 04/26/22  0749 04/25/22  1037 04/24/22  0433   INR  3.46* 3.23* 3.27*         Assessment/Plan     Hematoma of left iliopsoas muscle      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  The hip weakness is probably commendation of hematoma and iliopsoas as well as nerve impingement.  He is weaker with knee extension compared to hip adduction which would point to also component of femoral nerve palsy.     Reviewed features of upper lumbosacral plexopathy greater than lower lumbosacral plexopathy with the patient and his wife, reviewed timeframe of recovery is typically measured in months and may be incomplete, and reviewed need for bracing which will be determined and see how he progresses with his rehabilitation program.  Reviewed different styles of knee orthosis including dynamic knee extension versus lockable knee unit depending on level of stability he needs.        History of mechanical heart valve-  Mechanical aortic valve replacement  Prior CVA with residual left-sided weakness (INR at 2.2)  Chronic anticoagulation with goal INR of 3 - 3.5  INR goal 3.0-3.5 secondary to previous stroke-pharmacy managing  April 21-INR 2.92.  Discontinue Lovenox.  Continue Coumadin  April 22-INR trended back down to 2.5 to.  Had been on Coumadin 7.5 mg through April 19, received 10 mg on April 20, increased INR from 2.07 to 2.92.  Given the rate of  increase in INR, Coumadin was held yesterday.  INR trended back  down to 2.52 today.  Anticipate Coumadin 10 mg dose today.  Will recheck INR this afternoon around 3:30 PM and if trend lower, will give Lovenox 70 mg subcutaneous x1 and recheck INR in the a.m.. Concern would be for re-bleed as previously bleed on ASA and Lovenox to coumadin transition when INR was 1.6. Previous stroke when INR was 2.2.   April 25-INR 3.2    INR goal 3.0-3.5     Atrial fibrillation-digoxin/metoprolol  Nonischemic cardiomyopathy.  Hypertension.     Impaired mobility/impaired self-care     History of lower GI bleed with hematochezia in November 2021  Right rectus sheath hematoma in November 2021     Chronic kidney disease stage III  History of renal cell carcinoma and right nephrectomy      Hyperglycemia-Lantus  April 21-blood glucose low this morning and decrease Lantus     Anemia-ferrous sulfate     Recent C. difficile colitis-completed vancomycin taper     Now admit for comprehensive acute inpatient rehabilitation .  This would be an interdisciplinary program with physical therapy 1.5 hour,  occupational therapy 1.5 hour,  5 days a week.  Rehabilitation nursing for carryover, monitoring of cardiac and neurologic   status, bowel and bladder, and skin  Ongoing physician follow-up.  Weekly team conferences.  Goals are to achieve a level of contact-guard assist with  mobility and self-care and improved strength.   Rehabilitation prognosis indeterminate.  Medical prognosis indeterminate.  Estimated length of stay is approximately 2 weeks, but is only an estimation.      The patient's functional status and clinical status is unchanged from preadmission assessment and the patient continues appropriate for acute inpatient rehabilitation.  Goal is for home with outpatient   therapies.  Barrier to discharge: Impaired mobility and self-care- work on strength, transfers, balance, progressive ambulation, bracing, activities of daily living to overcome.                 Grant Andrade MD      During  rounds, used appropriate personal protective equipment including mask and gloves.  Additional gown if indicated.  Mask used was standard procedure mask. Appropriate PPE was worn during the entire visit.  Hand hygiene was completed before and after.

## 2022-04-26 NOTE — THERAPY TREATMENT NOTE
Inpatient Rehabilitation - Occupational Therapy Treatment Note    [facility]     Patient Name: Francisco Sequeira  : 1935  MRN: [id number]    Today's Date: 2022                 Admit Date: 2022         ICD-10-CM ICD-9-CM   1. 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)   • Iron deficiency anemia   • Chronic anticoagulation   • Hemiparesis of left nondominant side as late effect of cerebral infarction (HCC)   • Rectus sheath hematoma   • Sepsis (HCC)   • Calculus of gallbladder with acute cholecystitis without obstruction   • History of Clostridium difficile infection   • Aspiration pneumonitis (HCC)   • Hematoma of iliopsoas muscle, left, initial encounter   • History of CVA (cerebrovascular accident)   • S/P laparoscopic cholecystectomy   • Anemia   • Hematoma of left iliopsoas muscle       Past Medical History:   Diagnosis Date   • Allergic rhinitis    • Aortic valve insufficiency    • Ascending aortic aneurysm (HCC)    • Atrial fibrillation (HCC)    • Bacteremia    • Calcific aortic stenosis of bicuspid valve    • Cardiac arrest (HCC)    • Cardiomyopathy (HCC)    • CKD (chronic kidney disease) stage 3, GFR 30-59 ml/min (HCC)    • Contact dermatitis due to poison ivy    • Elbow fracture    • Esophageal reflux    • GERD (gastroesophageal reflux disease)    • Head injury    • History of transfusion    • Hyperglycemia    • Hyperlipidemia    • Hypertension    • Kidney carcinoma (HCC)    • Nonischemic  cardiomyopathy (HCC)    • Renal oncocytoma    • Seasonal allergic reaction    • Sinusitis    • Syncope    • Type 2 diabetes mellitus (HCC)     uncontrolled   • Visual field defect        Past Surgical History:   Procedure Laterality Date   • AORTIC VALVE REPAIR/REPLACEMENT     • ASCENDING AORTIC ANEURYSM REPAIR W/ MECHANICAL AORTIC VALVE REPLACEMENT     • CHOLECYSTECTOMY WITH INTRAOPERATIVE CHOLANGIOGRAM N/A 3/29/2022    Procedure: Laparoscopic cholecystectomy with cholangiogram, possible open;  Surgeon: Lisa Guidry MD;  Location: [facility] MAIN OR;  Service: General;  Laterality: N/A;   • COLONOSCOPY N/A 10/28/2021    Procedure: COLONOSCOPY to cecum:  cold snare polyps,;  Surgeon: Dinesh Meza MD;  Location: [facility] ENDOSCOPY;  Service: Gastroenterology;  Laterality: N/A;  pre:  Iron deficiency anemia  post:  polyps, diverticulosis,    • COLONOSCOPY N/A 11/9/2021    Procedure: COLONOSCOPY to cecum with APC cautery of AVM and clip placement x1;  Surgeon: Pavan Rodriguez MD;  Location: [facility] ENDOSCOPY;  Service: Gastroenterology;  Laterality: N/A;  PRE - gi bleed, anemia  POST - diverticulosis, poor prep, AVM right colon   • ENDOSCOPY N/A 10/28/2021    Procedure: ESOPHAGOGASTRODUODENOSCOPY with biopsies;  Surgeon: Dinesh Meza MD;  Location: [facility] ENDOSCOPY;  Service: Gastroenterology;  Laterality: N/A;  pre:  Iron deficiency anemia  post:  duodenitis and gastritis   • EYE SURGERY  12/09/2020    cataract surgery    • NEPHRECTOMY     • OTHER SURGICAL HISTORY      elbow surgery   • OTHER SURGICAL HISTORY      right arm surgery   • PROSTATE SURGERY     • THORACENTESIS Left     diagnostic             IRF OT ASSESSMENT FLOWSHEET (last 12 hours)     IRF OT Evaluation and Treatment     Row Name 04/26/22 1536 04/26/22 1221       OT Time and Intention    Document Type daily treatment  -DN daily treatment  -DN    Mode of Treatment occupational therapy  -DN occupational therapy  -DN    Patient Effort  good  -DN good  -DN    Row Name 04/26/22 1221          Pain Assessment    Pretreatment Pain Rating 0/10 - no pain  -DN     Posttreatment Pain Rating 0/10 - no pain  -DN     Pain Location - Side/Orientation Left  -DN     Pain Location lower  -DN     Row Name 04/26/22 1221          Cognition/Psychosocial    Affect/Mental Status (Cognition) WFL  -DN     Row Name 04/26/22 1221          Bathing    [place] Level (Bathing) bathing skills;minimum assist (75% patient effort);verbal cues;nonverbal cues (demo/gesture)  -DN     Assistive Device (Bathing) hand held shower spray hose;grab bar/tub rail;tub bench  -DN     Position (Bathing) supported standing;supported sitting  -DN     Comment (Bathing) one person to stand with use of grab bars  -DN     Row Name 04/26/22 1221          Upper Body Dressing    [place] Level (Upper Body Dressing) upper body dressing skills;supervision  -DN     Position (Upper Body Dressing) supported sitting  -DN     Row Name 04/26/22 1221          Lower Body Dressing    [place] Level (Lower Body Dressing) doff;don;pants/bottoms;shoes/slippers;socks;underwear;clothes fastener management;moderate assist (50% patient effort);verbal cues;nonverbal cues (demo/gesture)  -DN     Position (Lower Body Dressing) supported sitting;supported standing  -DN     Set-up Assistance (Lower Body Dressing) obtain clothing  -DN     Comment (Lower Body Dressing) 1 person to stand with pt BUE support on grab bar  -DN     Row Name 04/26/22 1221          Grooming    [place] Level (Grooming) grooming skills;deodorant application;oral care regimen;supervision  -DN     Position (Grooming) sink side;supported sitting  -DN     Row Name 04/26/22 1221          Toileting    [place] Level (Toileting) toileting skills;adjust/manage clothing;moderate assist (50% patient effort);nonverbal cues (demo/gesture);verbal cues  -DN     Assistive Device Use (Toileting) grab bar/safety frame;raised toilet seat  -DN      Position (Toileting) supported sitting;supported standing  -DN     Comment (Toileting) grab bars for standing therapist assist with brief  -DN     Row Name 04/26/22 1221          Transfers    Bed-[place]air [place] (Transfers) minimum assist (75% patient effort);nonverbal cues (demo/gesture)  -DN     Assistive Device (Bed-Chair Transfers) wheelchair  -DN     Row Name 04/26/22 1221          Bed-Chair Transfer    Comment, (Bed-Chair Transfer) squat pivot  -DN     Row Name 04/26/22 1536          Shoulder (Therapeutic Exercise)    Shoulder (Therapeutic Exercise) strengthening exercise  -DN     Shoulder AROM (Therapeutic Exercise) bilateral;10 repetitions;3 sets  -DN     Shoulder Strengthening (Therapeutic Exercise) 2 lb free weight;resistance band;yellow  -DN     Row Name 04/26/22 1536          Elbow/Forearm (Therapeutic Exercise)    Elbow/Forearm (Therapeutic Exercise) strengthening exercise  -DN     Elbow/Forearm AROM (Therapeutic Exercise) bilateral;10 repetitions;3 sets  -DN     Elbow/Forearm Strengthening (Therapeutic Exercise) 2 lb free weight  -DN     Row Name 04/26/22 1536          Wrist (Therapeutic Exercise)    Wrist (Therapeutic Exercise) strengthening exercise  -DN     Wrist AROM (Therapeutic Exercise) bilateral;10 repetitions;3 sets  -DN     Wrist Strengthening (Therapeutic Exercise) 2 lb free weight  -DN     Row Name 04/26/22 1536          Hand (Therapeutic Exercise)    Hand (Therapeutic Exercise) strengthening exercise  -DN     Hand Strengthening (Therapeutic Exercise) bilateral;hand gripper  -DN     Row Name 04/26/22 1536 04/26/22 1221       Positioning and Restraints    Pre-Treatment Position sitting in chair/recliner  -DN in bed  -DN    Post Treatment Position wheelchair  -DN wheelchair  -DN    In Bed sitting;call light within reach;encouraged to call for assist;exit alarm on  -DN sitting;call light within reach;encouraged to call for assist;exit alarm on  -DN          User Key  (r) = Recorded By, (t)  = Taken By, (c) = Cosigned By    Initials Name Effective Dates    Francisco Batista OT 06/16/21 -                  Occupational Therapy Education                 Title: PT OT SLP Therapies (In Progress)     Topic: Occupational Therapy (Not Started)     Point: ADL training (Not Started)     Description:   Instruct learner(s) on proper safety adaptation and remediation techniques during self care or transfers.   Instruct in proper use of assistive devices.              Learner Progress:  Not documented in this visit.          Point: Home exercise program (Not Started)     Description:   Instruct learner(s) on appropriate technique for monitoring, assisting and/or progressing therapeutic exercises/activities.              Learner Progress:  Not documented in this visit.          Point: Precautions (Not Started)     Description:   Instruct learner(s) on prescribed precautions during self-care and functional transfers.              Learner Progress:  Not documented in this visit.          Point: Body mechanics (Not Started)     Description:   Instruct learner(s) on proper positioning and spine alignment during self-care, functional mobility activities and/or exercises.              Learner Progress:  Not documented in this visit.                                OT Recommendation and Plan    Planned Therapy Interventions (OT): activity tolerance training, adaptive equipment training, BADL retraining, ROM/therapeutic exercise, strengthening exercise, transfer/mobility retraining                    Time Calculation:      Time Calculation- OT     Row Name 04/26/22 1400 04/26/22 0930          Time Calculation- OT    OT Start Time 1400  -DN 0930  -DN     OT Stop Time 1430  -DN 1030  -DN     OT Time Calculation (min) 30 min  -DN 60 min  -DN           User Key  (r) = Recorded By, (t) = Taken By, (c) = Cosigned By    Initials Name Provider Type    Francisco Batista OT Occupational Therapist              Therapy Charges for Today      Code Description Service Date Service Provider Modifiers Qty    44437840992 HC OT SELF CARE/MGMT/TRAIN EA 15 MIN 4/25/2022 Francisco Jane OT GO 1    94444846240 HC OT THER PROC EA 15 MIN 4/25/2022 Francisco Jane OT GO 3    85046038665 HC OT THER PROC EA 15 MIN 4/25/2022 Francisco Jane OT GO 2    06973324271 HC OT SELF CARE/MGMT/TRAIN EA 15 MIN 4/26/2022 Francisco Jane OT GO 4    72005794787 HC OT THER PROC EA 15 MIN 4/26/2022 Francisco Jane OT GO 2                   Francisco Jane OT  4/26/2022

## 2022-04-26 NOTE — PLAN OF CARE
Problem: Rehabilitation (IRF) Plan of Care  Goal: Plan of Care Review  Flowsheets (Taken 4/26/2022 1760)  Progress: improving  Plan of Care Reviewed With: patient  Outcome Evaluation: Pt is A&Ox4 but can be forgetful, meds whole w/ applesauce, his HS accuchek was 156 order to give 10 units of Lantus, I discussed with patient needing to eat a small snack but pt refused snack said he was going to bed for the night, did not give Lantus, mepilex on neck and bottom, no c/o pain, Will continue to monitor.   Goal Outcome Evaluation:  Plan of Care Reviewed With: patient        Progress: improving  Outcome Evaluation: Pt is A&Ox4 but can be forgetful, meds whole w/ applesauce, his HS accuchek was 156 order to give 10 units of Lantus, I discussed with patient needing to eat a small snack but pt refused snack said he was going to bed for the night, did not give Lantus, mepilex on neck and bottom, no c/o pain, Will continue to monitor.

## 2022-04-26 NOTE — PROGRESS NOTES
[facility] Clinical Pharmacy Services: Warfarin Dosing/Monitoring Consult    Francisco Sequeira is a 86 y.o. male, estimated creatinine clearance is 35.5 mL/min (A) (by C-G formula based on SCr of 1.32 mg/dL (H)). weighing 60.2 kg (132 lb 11.5 oz).    Results from last 7 days   Lab Units 04/26/22  0749 04/25/22  1037 04/24/22  0433 04/23/22  0758 04/23/22  0732 04/22/22  1950 04/22/22  0926   INR  3.46* 3.23* 3.27*  --  2.69* 2.63* 2.52*   HEMOGLOBIN g/dL 9.6* 10.3* 9.2* 9.9*  --   --  10.4*   HEMATOCRIT % 30.7* 33.6* 30.8* 31.8*  --   --  33.9*   PLATELETS 10*3/mm3 209 265 208 196  --   --  263     Prior to admission anticoagulation: Per [facility] clinic visit note - pt takes warfarin 7.5 mg every Mon, Wed, Fri; 5 mg all other days    Hospital Anticoagulation:  Consulting provider: Dr. Andrade  Start date: 4/20/22  Indication: Mechanical valve  Target INR:  3-3.5  Expected duration: tbd   Bridge Therapy: no. Lovenox stopped on 4/21.    Potential food or drug interactions: none currently    Education complete?/Date: Home medication    Assessment/Plan:  INR therapeutic at 3.46 today. Will continue patient's home warfarin regimen of 7.5 mg every Mon, Wed, Fri; 5 mg all other days.    Monitor for any signs or symptoms of bleeding  Follow up daily INRs and dose adjustments.     Pharmacy will continue to follow until discharge or discontinuation of warfarin.     Keyanna Saenz, PharmD  Clinical Pharmacist

## 2022-04-26 NOTE — PROGRESS NOTES
Reviewed progress, weekly and d/c goals, and ELOS with patient and wife. They are agreeable to plan. Wife stated she tried to write in wisdom and Boost on patient's menu but they would not send either due to his cardiac/consistent carb diet order. Will talk with dietitian about this. Dietitian has given wife menu suggestions to write in and wife stated this has been helpful. Patient stated he is trying to eat more as he knows his nutrition is a factor in his recovery.

## 2022-04-27 LAB
FUNGUS WND CULT: NORMAL
GLUCOSE BLDC GLUCOMTR-MCNC: 101 MG/DL (ref 70–130)
GLUCOSE BLDC GLUCOMTR-MCNC: 130 MG/DL (ref 70–130)
GLUCOSE BLDC GLUCOMTR-MCNC: 139 MG/DL (ref 70–130)
GLUCOSE BLDC GLUCOMTR-MCNC: 152 MG/DL (ref 70–130)
INR PPP: 3.49 (ref 0.9–1.1)
PROTHROMBIN TIME: 35.4 SECONDS (ref 11.7–14.2)

## 2022-04-27 PROCEDURE — 82962 GLUCOSE BLOOD TEST: CPT

## 2022-04-27 PROCEDURE — 97110 THERAPEUTIC EXERCISES: CPT

## 2022-04-27 PROCEDURE — 97530 THERAPEUTIC ACTIVITIES: CPT

## 2022-04-27 PROCEDURE — 85610 PROTHROMBIN TIME: CPT | Performed by: PHYSICAL MEDICINE & REHABILITATION

## 2022-04-27 PROCEDURE — 97112 NEUROMUSCULAR REEDUCATION: CPT

## 2022-04-27 RX ORDER — FAMOTIDINE 20 MG/1
20 TABLET, FILM COATED ORAL DAILY
Status: DISCONTINUED | OUTPATIENT
Start: 2022-04-28 | End: 2022-05-18 | Stop reason: HOSPADM

## 2022-04-27 RX ORDER — BETAMETHASONE DIPROPIONATE 0.05 %
1 OINTMENT (GRAM) TOPICAL 3 TIMES DAILY
Status: DISCONTINUED | OUTPATIENT
Start: 2022-04-27 | End: 2022-05-02

## 2022-04-27 RX ADMIN — ATORVASTATIN CALCIUM 40 MG: 20 TABLET, FILM COATED ORAL at 08:06

## 2022-04-27 RX ADMIN — MAGNESIUM OXIDE 400 MG (241.3 MG MAGNESIUM) TABLET 400 MG: TABLET at 08:06

## 2022-04-27 RX ADMIN — NYSTATIN: 100000 POWDER TOPICAL at 21:12

## 2022-04-27 RX ADMIN — MUPIROCIN 1 APPLICATION: 20 OINTMENT TOPICAL at 05:33

## 2022-04-27 RX ADMIN — FAMOTIDINE 20 MG: 20 TABLET ORAL at 05:33

## 2022-04-27 RX ADMIN — METOPROLOL SUCCINATE 12.5 MG: 25 TABLET, EXTENDED RELEASE ORAL at 08:06

## 2022-04-27 RX ADMIN — MAGNESIUM OXIDE 400 MG (241.3 MG MAGNESIUM) TABLET 400 MG: TABLET at 21:11

## 2022-04-27 RX ADMIN — MUPIROCIN 1 APPLICATION: 20 OINTMENT TOPICAL at 15:00

## 2022-04-27 RX ADMIN — Medication 1 CAPSULE: at 08:06

## 2022-04-27 RX ADMIN — DIGOXIN 62.5 MCG: 125 TABLET ORAL at 12:19

## 2022-04-27 RX ADMIN — WARFARIN 7.5 MG: 7.5 TABLET ORAL at 16:51

## 2022-04-27 RX ADMIN — NYSTATIN: 100000 POWDER TOPICAL at 08:06

## 2022-04-27 NOTE — THERAPY TREATMENT NOTE
Inpatient Rehabilitation - Physical Therapy Treatment Note       [facility]     Patient Name: Francisco Sequeira  : 1935  MRN: [id number]    Today's Date: 2022                    Admit Date: 2022      Visit Dx:     ICD-10-CM ICD-9-CM   1. 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)   • Iron deficiency anemia   • Chronic anticoagulation   • Hemiparesis of left nondominant side as late effect of cerebral infarction (HCC)   • Rectus sheath hematoma   • Sepsis (HCC)   • Calculus of gallbladder with acute cholecystitis without obstruction   • History of Clostridium difficile infection   • Aspiration pneumonitis (HCC)   • Hematoma of iliopsoas muscle, left, initial encounter   • History of CVA (cerebrovascular accident)   • S/P laparoscopic cholecystectomy   • Anemia   • Hematoma of left iliopsoas muscle       Past Medical History:   Diagnosis Date   • Allergic rhinitis    • Aortic valve insufficiency    • Ascending aortic aneurysm (HCC)    • Atrial fibrillation (HCC)    • Bacteremia    • Calcific aortic stenosis of bicuspid valve    • Cardiac arrest (HCC)    • Cardiomyopathy (HCC)    • CKD (chronic kidney disease) stage 3, GFR 30-59 ml/min (HCC)    • Contact dermatitis due to poison ivy    • Elbow fracture    • Esophageal reflux    • GERD (gastroesophageal reflux disease)    • Head injury    • History of transfusion    • Hyperglycemia    • Hyperlipidemia    • Hypertension    • Kidney carcinoma (HCC)    •  Nonischemic cardiomyopathy (HCC)    • Renal oncocytoma    • Seasonal allergic reaction    • Sinusitis    • Syncope    • Type 2 diabetes mellitus (HCC)     uncontrolled   • Visual field defect        Past Surgical History:   Procedure Laterality Date   • AORTIC VALVE REPAIR/REPLACEMENT     • ASCENDING AORTIC ANEURYSM REPAIR W/ MECHANICAL AORTIC VALVE REPLACEMENT     • CHOLECYSTECTOMY WITH INTRAOPERATIVE CHOLANGIOGRAM N/A 3/29/2022    Procedure: Laparoscopic cholecystectomy with cholangiogram, possible open;  Surgeon: Lisa Guidry MD;  Location: [facility] MAIN OR;  Service: General;  Laterality: N/A;   • COLONOSCOPY N/A 10/28/2021    Procedure: COLONOSCOPY to cecum:  cold snare polyps,;  Surgeon: Dinesh Meza MD;  Location: [facility] ENDOSCOPY;  Service: Gastroenterology;  Laterality: N/A;  pre:  Iron deficiency anemia  post:  polyps, diverticulosis,    • COLONOSCOPY N/A 11/9/2021    Procedure: COLONOSCOPY to cecum with APC cautery of AVM and clip placement x1;  Surgeon: Pavan Rodriguez MD;  [facility] END[facility];  Service: Gastroenterology;  Laterality: N/A;  PRE - gi bleed, anemia  POST - diverticulosis, poor prep, AVM right colon   • ENDOSCOPY N/A 10/28/2021    Procedure: ESOPHAGOGASTRODUODENOSCOPY with biopsies;  Surgeon: Dinesh Meza MD;  Location: [facility] ENDOSCOPY;  Service: Gastroenterology;  Laterality: N/A;  pre:  Iron deficiency anemia  post:  duodenitis and gastritis   • EYE SURGERY  12/09/2020    cataract surgery    • NEPHRECTOMY     • OTHER SURGICAL HISTORY      elbow surgery   • OTHER SURGICAL HISTORY      right arm surgery   • PROSTATE SURGERY     • THORACENTESIS Left     diagnostic       PT ASSESSMENT (last 12 hours)     IRF PT Evaluation and Treatment     Row Name 04/27/22 1115          PT Time and Intention    Document Type daily treatment  -MS     Mode of Treatment physical therapy  -MS     Patient/Family/Caregiver Comments/Observations AM and PM: sitting up in WC, NAD,  exit alarm on, denies any pain or discomfort, spouse in room  -MS     Total Minutes, Physical Therapy 90  -MS     Row Name 04/27/22 1115          General Information    Existing Precautions/Restrictions fall  L knee buckling  -MS     Limitations/Impairments safety/cognitive  -MS     Row Name 04/27/22 1115          Pain Assessment    Pretreatment Pain Rating 0/10 - no pain  -MS     Posttreatment Pain Rating 0/10 - no pain  -MS     Row Name 04/27/22 1115          Cognition/Psychosocial    Affect/Mental Status (Cognition) WFL  -MS     Orientation Status (Cognition) oriented x 4  -MS     Follows Commands (Cognition) follows two-step commands;physical/tactile prompts required;repetition of directions required;verbal cues/prompting required;increased processing time needed  -MS     Personal Safety Interventions fall prevention program maintained;gait belt;nonskid shoes/slippers when out of bed  -MS     Cognitive Function safety deficit  -MS     Safety Deficit (Cognition) minimal deficit;awareness of need for assistance;impulsivity;insight into deficits/self-awareness  -MS     Row Name 04/27/22 1115          Vision Assessment/Intervention    Visual Impairment/Limitations WFL;corrective lenses full-time  -MS     Row Name 04/27/22 1115          Mobility    Weight Shifting Techniques anterior weight shift techniques;lateral weight shift techniques  at // bars, max cues for WS to L  -MS     Additional Documentation Weight Shifting Techniques (Row)  -MS     Row Name 04/27/22 1115          Transfer Assessment/Treatment    Comment, (Transfers) KI donned for AM session with transfers and ambulation.  -MS     Row Name 04/27/22 1115          Transfers    Sit-Stand Ramsey (Transfers) minimum assist (75% patient effort);moderate assist (50% patient effort);verbal cues;nonverbal cues (demo/gesture)  -MS     Stand-Sit Ramsey (Transfers) minimum assist (75% patient effort);verbal cues;nonverbal cues (demo/gesture)  -MS      Row Name 04/27/22 1115          Sit-Stand Transfer    Assistive Device (Sit-Stand Transfers) walker, front-wheeled  -MS     Row Name 04/27/22 1115          Stand-Sit Transfer    Assistive Device (Stand-Sit Transfers) walker, front-wheeled  -MS     Comment, (Stand-Sit Transfer) Max cues to advance L LE partially forward prior to sitting due to KI, poor carryover despite performing multiple times  -MS     Row Name 04/27/22 1115          Gait/Stairs (Locomotion)    [place] Level (Gait) minimum assist (75% patient effort);2 person assist;moderate assist (50% patient effort);verbal cues;nonverbal cues (demo/gesture)  -MS     Assistive Device (Gait) walker, front-wheeled  -MS     Distance in Feet (Gait) with KI donned and RW: 40' x 2 trials, no KI and at // bars: 8' x 6 trials  -MS     Pattern (Gait) step-through  -MS     Deviations/Abnormal Patterns (Gait) raul decreased;left sided deviations;stride length decreased  -MS     Bilateral Gait Deviations forward flexed posture  -MS     Left Sided Gait Deviations knee buckling, left side;heel strike decreased;weight shift ability decreased  -MS     Comment, (Gait/Stairs) Fatigues quickly, cues for L heel strike and tightening L glute when WS. Ambulation performed with and without KI. Without KI, therapist on stool in front of patient providing assist to prevent L knee buckling. Fatigue limiting and at length rest breaks required.  -MS     Row Name 04/27/22 1115          Balance    Static Sitting Balance supervision  -MS     Dynamic Sitting Balance standby assist  -MS     Position, Sitting Balance sitting in chair  -MS     Static Standing Balance minimal assist  -MS     Dynamic Standing Balance minimal assist;moderate assist  -MS     Position/Device Used, Standing Balance supported;parallel bars  -MS     Balance Interventions supported;static;dynamic;highly challenging;weight shifting activity;step overs  -MS     Row Name 04/27/22 1115          Hip (Therapeutic  Exercise)    Hip AAROM (Therapeutic Exercise) sitting;left;flexion;10 repetitions  -MS     Hip Strengthening (Therapeutic Exercise) sitting;right;marching while seated;10 repetitions;3 sets  -MS     Row Name 04/27/22 1115          Knee (Therapeutic Exercise)    Knee AAROM (Therapeutic Exercise) sitting;left;extension;10 repetitions;3 sets  -MS     Knee Strengthening (Therapeutic Exercise) sitting;right;LAQ (long arc quad);10 repetitions;3 sets  -MS     Row Name 04/27/22 1115          Positioning and Restraints    Pre-Treatment Position sitting in chair/recliner  -MS     Post Treatment Position wheelchair  -MS     In Wheelchair sitting;exit alarm on  AM: room with spouse; PM: OT  -MS           User Key  (r) = Recorded By, (t) = Taken By, (c) = Cosigned By    Initials Name Provider Type    MS Abrams Goldie LEELEE, PT Physical Therapist              Wound 03/29/22 1858 abdomen Incision (Active)   Drainage Amount none 04/27/22 0806       Wound 04/18/22 2048 Bilateral medial gluteal MASD (Moisture associated skin damage) (Active)   Care, Wound cleansed with;soap and water 04/27/22 0806       Wound 04/18/22 2048 Bilateral groin MASD (Moisture associated skin damage) (Active)   Care, Wound cleansed with;soap and water 04/27/22 0806   Periwound Care topical treatment applied 04/27/22 0806       Wound 04/20/22 1659 Bilateral medial sacral spine Pressure Injury (Active)   Dressing Appearance intact 04/26/22 2020   Drainage Amount none 04/27/22 0806   Dressing Care dressing reinforced 04/26/22 2020     Physical Therapy Education                 Title: PT OT SLP Therapies (In Progress)     Topic: Physical Therapy (Done)     Point: Mobility training (Done)     Learning Progress Summary           Patient Acceptance, E,TB, NR,VU by MS at 4/27/2022 1119    Acceptance, E,TB, VU,NR by MS at 4/26/2022 1458    Acceptance, E,TB, VU,NR by MS at 4/25/2022 1422    Acceptance, E, NR,VU,DU by  at 4/23/2022 1130    Acceptance, E,TB, VU by MS  at 4/21/2022 1102   Significant Other Acceptance, E,TB, NR,VU by MS at 4/27/2022 1119    Acceptance, E,TB, VU by MS at 4/21/2022 1102                   Point: Home exercise program (Done)     Learning Progress Summary           Patient Acceptance, E,TB, NR,VU by MS at 4/27/2022 1119    Acceptance, E,TB, VU,NR by MS at 4/26/2022 1458    Acceptance, E,TB, VU,NR by MS at 4/25/2022 1422    Acceptance, E, NR,VU,DU by  at 4/23/2022 1130    Acceptance, E,TB, VU by MS at 4/21/2022 1102   Significant Other Acceptance, E,TB, NR,VU by MS at 4/27/2022 1119    Acceptance, E,TB, VU by MS at 4/21/2022 1102                   Point: Body mechanics (Done)     Learning Progress Summary           Patient Acceptance, E,TB, NR,VU by MS at 4/27/2022 1119    Acceptance, E,TB, VU,NR by MS at 4/26/2022 1458    Acceptance, E,TB, VU,NR by MS at 4/25/2022 1422    Acceptance, E, NR,VU,DU by  at 4/23/2022 1130    Acceptance, E,TB, VU by MS at 4/21/2022 1102   Significant Other Acceptance, E,TB, NR,VU by MS at 4/27/2022 1119    Acceptance, E,TB, VU by MS at 4/21/2022 1102                   Point: Precautions (Done)     Learning Progress Summary           Patient Acceptance, E,TB, NR,VU by MS at 4/27/2022 1119    Acceptance, E,TB, VU,NR by MS at 4/26/2022 1458    Acceptance, E,TB, VU,NR by MS at 4/25/2022 1422    Acceptance, E, NR,VU,DU by  at 4/23/2022 1130    Acceptance, E,TB, VU by MS at 4/21/2022 1102   Significant Other Acceptance, E,TB, NR,VU by MS at 4/27/2022 1119    Acceptance, E,TB, VU by MS at 4/21/2022 1102                               User Key     Initials Effective Dates Name Provider Type Discipline     06/16/21 -  Sandi Shepard, PT Physical Therapist PT    MS 06/16/21 -  Goldie Abrams, PT Physical Therapist PT                PT Recommendation and Plan    Planned Therapy Interventions (PT): bed mobility training, balance training, gait training, home exercise program, postural re-education, patient/family education, ROM  (range of motion), strengthening, transfer training  Frequency of Treatment (PT): 90 minutes per session  Anticipated Equipment Needs at Discharge (PT Eval): wheelchair (pending patient progress made, LRAD for R LE)                  Time Calculation:      PT Charges     Row Name 04/27/22 1317 04/27/22 1115          Time Calculation    Start Time 1300  -MS 1100  -MS     Stop Time 1400  -MS 1130  -MS     Time Calculation (min) 60 min  -MS 30 min  -MS     PT Received On -- 04/27/22  -MS     PT - Next Appointment -- 04/28/22  -MS           User Key  (r) = Recorded By, (t) = Taken By, (c) = Cosigned By    Initials Name Provider Type    MS Goldie Abrams, PT Physical Therapist                Therapy Charges for Today     Code Description Service Date Service Provider Modifiers Qty    [id number]  PT THER PROC EA 15 MIN 4/26/2022 Goldie Abrams, PT GP 4    [id number]  PT THERAPEUTIC ACT EA 15 MIN 4/26/2022 Goldie Abrams, PT GP 1    [id number]  GAIT TRAINING EA 15 MIN 4/26/2022 Goldie Abrams, PT GP 1    [id number]  PT THER SUPP EA 15 MIN 4/26/2022 Goldie Abrams, PT GP 3    [id number]  PT THER PROC EA 15 MIN 4/27/2022 Goldie Abrams, PT GP 4    [id number]  PT THERAPEUTIC ACT EA 15 MIN 4/27/2022 Goldie Abrams, PT GP 2                   Goldie Abrams, PT  4/27/2022

## 2022-04-27 NOTE — PROGRESS NOTES
LOS: 7 days   Patient Care Team:  Rubin Hinkle APRN as PCP - General (Family Medicine)  Audra Vazquez RODDY as Pharmacist  Vasquez Niño, PharmD as Pharmacist (Pharmacy)  Tatiana Tinoco MD as Consulting Physician (Gastroenterology)      LAURA XIE  1935    Diagnoses    1. 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  Hypertension.  Impaired mobility/impaired self-care  Chronic kidney disease stage III  Hyperglycemia-Lantus  Anemia-ferrous sulfate  Recent C. difficile colitis-completed vancomycin taper      Subjective   He describes his weakness in the left leg about the same.  Still weak at the hip and the knee extensor.  Doing better with left knee flexion which may in part be related to following the instructions better.  Worked on ambulation with a knee immobilizer      Objective     Vitals:    04/27/22 1219   BP: 112/62   Pulse: 75   Resp: 18   Temp: 97.3 °F (36.3 °C)   SpO2: 96%       PHYSICAL EXAM:      MENTAL STATUS -  AWAKE / ALERT  HEENT-    LUNGS -normal respiration  HEART-irregular   Prosthetic click  ABD -soft nontender  EXT - NO EDEMA OR CYANOSIS  NEURO -   MOTOR EXAM - RUE/RLE 5/5. LUE 5/5.    Left hip flexion 2/5, adduction takes resistance, hip extension-active, knee extension 0/5, knee flexion active, ankle dorsiflexion 4/5, EHL 4/5, ankle plantarflexion takes resistance         MEDICATIONS  Scheduled Meds:atorvastatin, 40 mg, Oral, Daily  digoxin, 62.5 mcg, Oral, Daily  [START ON 4/28/2022] famotidine, 20 mg, Oral, Daily  ferrous sulfate, 325 mg, Oral, Every Other Day  insulin  glargine, 10 Units, Subcutaneous, Nightly  insulin lispro, 0-9 Units, Subcutaneous, TID AC  lactobacillus acidophilus, 1 capsule, Oral, Daily  magnesium oxide, 400 mg, Oral, BID  metoprolol succinate XL, 12.5 mg, Oral, Daily  mupirocin, 1 application, Topical, Q8H  nystatin, , Topical, Q12H  warfarin, 5 mg, Oral, Once per day on Sun Tue Thu Sat  warfarin, 7.5 mg, Oral, Once per day on Mon Wed Fri      Continuous Infusions:Pharmacy to dose warfarin,       PRN Meds:.•  acetaminophen **OR** [DISCONTINUED] acetaminophen **OR** [DISCONTINUED] acetaminophen  •  calcium carbonate  •  dextrose  •  dextrose  •  diphenhydrAMINE  •  glucagon (human recombinant)  •  hydrocortisone-bacitracin-zinc oxide-nystatin  •  nitroglycerin  •  ondansetron **OR** ondansetron  •  Pharmacy to dose warfarin      RESULTS  Glucose   Date/Time Value Ref Range Status   04/27/2022 1051 152 (H) 70 - 130 mg/dL Final     Comment:     Meter: [id number] : [id number] [facility]   04/27/2022 0516 101 70 - 130 mg/dL Final     Comment:     Meter: [id number] : [id number] [facility]   04/26/2022 2010 221 (H) 70 - 130 mg/dL Final     Comment:     Meter: [id number] : [id number] [facility]   04/26/2022 1617 100 70 - 130 mg/dL Final     Comment:     Meter: [id number] : [id number] [facility]    04/26/2022 1145 201 (H) 70 - 130 mg/dL Final     Comment:     Meter: [id number] : [id number] [facility]    04/26/2022 0623 119 70 - 130 mg/dL Final     Comment:     Meter: [id number] : [id number] [facility]   04/25/2022 2035 156 (H) 70 - 130 mg/dL Final     Comment:     Meter: [id number] : [id number] [facility]   04/25/2022 1610 111 70 - 130 mg/dL Final     Comment:     Meter: [id number] : [id number] Dian ALEJO     Results from last 7 days   Lab Units 04/26/22  0749 04/25/22  1037 04/24/22  0433   WBC 10*3/mm3 4.01 5.41 4.60   HEMOGLOBIN g/dL 9.6* 10.3* 9.2*   HEMATOCRIT % 30.7* 33.6* 30.8*    PLATELETS 10*3/mm3 209 265 208     Results from last 7 days   Lab Units 04/25/22  1037 04/22/22  0926   SODIUM mmol/L 134* 135*   POTASSIUM mmol/L 4.4 4.6   CHLORIDE mmol/L 100 101   CO2 mmol/L 22.2 24.3   BUN mg/dL 22 14   CREATININE mg/dL 1.32* 1.18   CALCIUM mg/dL 8.5* 8.6   GLUCOSE mg/dL 193* 156*       Results from last 7 days   Lab Units 04/27/22  1041 04/26/22  0749 04/25/22  1037   INR  3.49* 3.46* 3.23*         Assessment/Plan     Hematoma of left iliopsoas muscle      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  The hip weakness is probably commendation of hematoma and iliopsoas as well as nerve impingement.  He is weaker with knee extension compared to hip adduction which would point to also component of femoral nerve palsy.     Reviewed features of upper lumbosacral plexopathy greater than lower lumbosacral plexopathy with the patient and his wife, reviewed timeframe of recovery is typically measured in months and may be incomplete, and reviewed need for bracing which will be determined and see how he progresses with his rehabilitation program.  Reviewed different styles of knee orthosis including dynamic knee extension versus lockable knee unit depending on level of stability he needs.        History of mechanical heart valve-  Mechanical aortic valve replacement  Prior CVA with residual left-sided weakness (INR at 2.2)  Chronic anticoagulation with goal INR of 3 - 3.5  INR goal 3.0-3.5 secondary to previous stroke-pharmacy managing  April 21-INR 2.92.  Discontinue Lovenox.  Continue Coumadin  April 22-INR trended back down to 2.5 to.  Had been on Coumadin 7.5 mg through April 19, received 10 mg on April 20, increased INR from 2.07 to 2.92.  Given the rate of  increase in INR, Coumadin was held yesterday.  INR  trended back down to 2.52 today.  Anticipate Coumadin 10 mg dose today.  Will recheck INR this afternoon around 3:30 PM and if trend lower, will give Lovenox 70 mg subcutaneous x1 and recheck INR in the a.m.. Concern would be for re-bleed as previously bleed on ASA and Lovenox to coumadin transition when INR was 1.6. Previous stroke when INR was 2.2.   April 25-INR 3.2  April 27-INR 3.49    INR goal 3.0-3.5     Atrial fibrillation-digoxin/metoprolol  Nonischemic cardiomyopathy.  Hypertension.     Impaired mobility/impaired self-care     History of lower GI bleed with hematochezia in November 2021  Right rectus sheath hematoma in November 2021     Chronic kidney disease stage III  History of renal cell carcinoma and right nephrectomy      Hyperglycemia-Lantus  April 21-blood glucose low this morning and decrease Lantus     Anemia-ferrous sulfate     Recent C. difficile colitis-completed vancomycin taper     Now admit for comprehensive acute inpatient rehabilitation .  This would be an interdisciplinary program with physical therapy 1.5 hour,  occupational therapy 1.5 hour,  5 days a week.  Rehabilitation nursing for carryover, monitoring of cardiac and neurologic   status, bowel and bladder, and skin  Ongoing physician follow-up.  Weekly team conferences.  Goals are to achieve a level of contact-guard assist with  mobility and self-care and improved strength.   Rehabilitation prognosis indeterminate.  Medical prognosis indeterminate.  Estimated length of stay is approximately 2 weeks, but is only an estimation.      The patient's functional status and clinical status is unchanged from preadmission assessment and the patient continues appropriate for acute inpatient rehabilitation.  Goal is for home with outpatient   therapies.  Barrier to discharge: Impaired mobility and self-care- work on strength, transfers, balance, progressive ambulation, bracing, activities of daily living to overcome.               Grant Melendez  MD Lupe      During rounds, used appropriate personal protective equipment including mask and gloves.  Additional gown if indicated.  Mask used was standard procedure mask. Appropriate PPE was worn during the entire visit.  Hand hygiene was completed before and after.

## 2022-04-27 NOTE — PROGRESS NOTES
Inpatient Rehabilitation Plan of Care Note    Plan of Care  Care Plan Reviewed - Updates as Follows    Body Systems    [RN] Integumentary(Active)  Current Status(04/27/2022): Pt has blanchable redness to coccyx with dry,  peeling skin to coccyx and buttocks. No open area noted. Excoriation to groin.  Scattered bruising. 3 glued lap sites to abd. Pt able to turn in bed.  Weekly Goal(05/03/2022): Skin will remain free from s/s infection.  Discharge Goal: Skin will remain free from further breakdown.    Performed Intervention(s)  barrier cream and powder, as ordered.  Skin assessment q shift and prn  Low air loss bed (waffle overlay not available per cpd).  Turn q2h or encourage turning.      Psychosocial    [RN] Coping/Adjustment(Active)  Current Status(04/27/2022): Patient is at risk for reduced coping r/t recent  hospitalization and new mobility issues. Pt is A/Ox4 but can be forgetful. Pt  has a supportive spouse.  Weekly Goal(05/03/2022): Pt will verbalize needs, concerns, feelings to staff as  needed.  Discharge Goal: Pt will be able to verbalize adequate coping strategies he can  use at home.    Performed Intervention(s)  Provide distraction and/or relaxation as needed.  Allow extra time for patient to verbalize needs, feelings, concerns, etc.      Safety    [RN] Potential for Injury(Active)  Current Status(04/27/2022): Pt is at increased risk for falls/injury r/t recent  hospitalization and reduced mobility.  Weekly Goal(05/03/2022): Pt will call appropriately for assistance as needed.  Discharge Goal: Pt will remain free from falls/injury.    Performed Intervention(s)  Hourly rounding.  Fall precautions: bed low and locked, patient belongings adn call light w/in  reach, nonskid socks and gait belt in use, bed and chair alarms in use.      Sphincter Control    [RN] Bladder Management(Active)  Current Status(04/27/2022): Pt can be continent and incontinent of urine.  Weekly Goal(05/03/2022): Pt will be 50%  continent.  Discharge Goal: Pt will be 75% continent of urine.    Performed Intervention(s)  Monitor I/O.  Toileting schedule while awake.  Assist with urinal as needed, use incontinence products as needed.    Signed by: Chantal Matt RN

## 2022-04-27 NOTE — PLAN OF CARE
Problem: Rehabilitation ([facility]) Plan of Care  Goal: Plan of Care Review  Flowsheets  Taken 4/27/2022 0512  Plan of Care Reviewed With: patient  Outcome Evaluation: Pt is A&Ox4, pleasant and cooperative, c/o being tired at bedtime, no c/o pain, meds whole in applesauce, he is continent and incontinent of b/b, accucheck ACHS, HS bg 219, gave 10 units of insulin, will check bg early as pt did not want a snack, applied bactroban to skin tear and replaced mepilex, applied nystatin powder to groin, will continue to monitor.  Taken 4/26/2022 0451  Progress: improving   Goal Outcome Evaluation:  Plan of Care Reviewed With: patient        Progress: improving  Outcome Evaluation: Pt is A&Ox4, pleasant and cooperative, c/o being tired at bedtime, no c/o pain, meds whole in applesauce, he is continent and incontinent of b/b, accucheck ACHS, HS bg 219, gave 10 units of insulin, will check bg early as pt did not want a snack, applied bactroban to skin tear and replaced mepilex, applied nystatin powder to groin, will continue to monitor.

## 2022-04-27 NOTE — PLAN OF CARE
Goal Outcome Evaluation:  Plan of Care Reviewed With: patient           Outcome Evaluation:  has tolerated all therapies, alert and oriented x4, and incontinent of b/b. He is assist x2, takes medication with applesauce, and accucheck AC/HS- no coverage needed so far. Actually, coverage needed at lunch, but wife did not want him to have it- family refused. Mepilex to coccyx- skin good, but encourage to turn and mepilex to back- ointment applied and dressing changed. No pain, no unsafe behavior and wife at bedside- very helpful.

## 2022-04-27 NOTE — THERAPY TREATMENT NOTE
Inpatient Rehabilitation - Occupational Therapy Treatment Note    [facility]     Patient Name: Francisco Sequeira  : 1935  MRN: [id number]    Today's Date: 2022                 Admit Date: 2022         ICD-10-CM ICD-9-CM   1. 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)   • Iron deficiency anemia   • Chronic anticoagulation   • Hemiparesis of left nondominant side as late effect of cerebral infarction (HCC)   • Rectus sheath hematoma   • Sepsis (HCC)   • Calculus of gallbladder with acute cholecystitis without obstruction   • History of Clostridium difficile infection   • Aspiration pneumonitis (HCC)   • Hematoma of iliopsoas muscle, left, initial encounter   • History of CVA (cerebrovascular accident)   • S/P laparoscopic cholecystectomy   • Anemia   • Hematoma of left iliopsoas muscle       Past Medical History:   Diagnosis Date   • Allergic rhinitis    • Aortic valve insufficiency    • Ascending aortic aneurysm (HCC)    • Atrial fibrillation (HCC)    • Bacteremia    • Calcific aortic stenosis of bicuspid valve    • Cardiac arrest (HCC)    • Cardiomyopathy (HCC)    • CKD (chronic kidney disease) stage 3, GFR 30-59 ml/min (HCC)    • Contact dermatitis due to poison ivy    • Elbow fracture    • Esophageal reflux    • GERD (gastroesophageal reflux disease)    • Head injury    • History of transfusion    • Hyperglycemia    • Hyperlipidemia    • Hypertension    • Kidney carcinoma (HCC)    • Nonischemic  cardiomyopathy (HCC)    • Renal oncocytoma    • Seasonal allergic reaction    • Sinusitis    • Syncope    • Type 2 diabetes mellitus (HCC)     uncontrolled   • Visual field defect        Past Surgical History:   Procedure Laterality Date   • AORTIC VALVE REPAIR/REPLACEMENT     • ASCENDING AORTIC ANEURYSM REPAIR W/ MECHANICAL AORTIC VALVE REPLACEMENT     • CHOLECYSTECTOMY WITH INTRAOPERATIVE CHOLANGIOGRAM N/A 3/29/2022    Procedure: Laparoscopic cholecystectomy with cholangiogram, possible open;  Surgeon: Lisa Guidry MD;  Location: [facility] MAIN OR;  Service: General;  Laterality: N/A;   • COLONOSCOPY N/A 10/28/2021    Procedure: COLONOSCOPY to cecum:  cold snare polyps,;  Surgeon: Dinesh Meza MD;  Location: [facility] ENDOSCOPY;  Service: Gastroenterology;  Laterality: N/A;  pre:  Iron deficiency anemia  post:  polyps, diverticulosis,    • COLONOSCOPY N/A 11/9/2021    Procedure: COLONOSCOPY to cecum with APC cautery of AVM and clip placement x1;  Surgeon: Pavan Rodriguez MD;  Location: [facility] ENDOSCOPY;  Service: Gastroenterology;  Laterality: N/A;  PRE - gi bleed, anemia  POST - diverticulosis, poor prep, AVM right colon   • ENDOSCOPY N/A 10/28/2021    Procedure: ESOPHAGOGASTRODUODENOSCOPY with biopsies;  Surgeon: Dinesh Meza MD;  Location: [facility] ENDOSCOPY;  Service: Gastroenterology;  Laterality: N/A;  pre:  Iron deficiency anemia  post:  duodenitis and gastritis   • EYE SURGERY  12/09/2020    cataract surgery    • NEPHRECTOMY     • OTHER SURGICAL HISTORY      elbow surgery   • OTHER SURGICAL HISTORY      right arm surgery   • PROSTATE SURGERY     • THORACENTESIS Left     diagnostic             IRF OT ASSESSMENT FLOWSHEET (last 12 hours)     IRF OT Evaluation and Treatment     Row Name 04/27/22 1544 04/27/22 1218       OT Time and Intention    Document Type daily treatment  -DN daily treatment  -DN    Mode of Treatment occupational therapy  -DN occupational therapy  -DN    Patient Effort  good  -DN good  -DN    Row Name 04/27/22 1218          Pain Assessment    Pretreatment Pain Rating 0/10 - no pain  -DN     Posttreatment Pain Rating 0/10 - no pain  -DN     Row Name 04/27/22 1218          Cognition/Psychosocial    Affect/Mental Status (Cognition) WFL  -DN     Row Name 04/27/22 1218          Transfers    Bed-Chair Groom (Transfers) minimum assist (75% patient effort);nonverbal cues (demo/gesture);verbal cues  -DN     Assistive Device (Bed-Chair Transfers) wheelchair  -DN     Row Name 04/27/22 1218          Bed-Chair Transfer    Comment, (Bed-Chair Transfer) squat pivot with w/c arm out of the way  -DN     Row Name 04/27/22 1544 04/27/22 1218       Shoulder (Therapeutic Exercise)    Shoulder (Therapeutic Exercise) strengthening exercise  -DN strengthening exercise  -DN    Shoulder AROM (Therapeutic Exercise) bilateral;10 repetitions;3 sets  -DN bilateral;10 repetitions;3 sets  -DN    Shoulder Strengthening (Therapeutic Exercise) --  lateral pulls on wall  with use of 1 #  -DN 2 lb free weight  yellow theraband, dowel tesha and arm bike at 2 sets of 2 minutes in seated position  -DN    Row Name 04/27/22 1218          Elbow/Forearm (Therapeutic Exercise)    Elbow/Forearm (Therapeutic Exercise) strengthening exercise  -DN     Elbow/Forearm AROM (Therapeutic Exercise) bilateral;10 repetitions;3 sets  -DN     Elbow/Forearm Strengthening (Therapeutic Exercise) 2 lb free weight  -DN     Row Name 04/27/22 1218          Wrist (Therapeutic Exercise)    Wrist (Therapeutic Exercise) strengthening exercise  -DN     Wrist AROM (Therapeutic Exercise) bilateral;10 repetitions;3 sets  -DN     Wrist Strengthening (Therapeutic Exercise) 2 lb free weight  -DN     Row Name 04/27/22 1218          Hand (Therapeutic Exercise)    Hand (Therapeutic Exercise) strengthening exercise  -DN     Hand Strengthening (Therapeutic Exercise) bilateral;hand gripper  -DN     Row Name 04/27/22 1544 04/27/22 1218       Balance     Dynamic Sitting Balance supervision  pt sitting on mat working on dynamic reaching activity with BUE to increase flexability and strength to further assist with increased adls  -DN --    Static Standing Balance -- contact guard  pt able to stand with right handed activity with LUE support during clothes pin activity  -DN    Row Name 04/27/22 1544 04/27/22 1218       Positioning and Restraints    Pre-Treatment Position sitting in chair/recliner  -DN sitting in chair/recliner  -DN    Post Treatment Position wheelchair  -DN wheelchair  -DN    In Bed sitting;call light within reach;encouraged to call for assist;exit alarm on  -DN sitting;call light within reach;encouraged to call for assist;exit alarm on  -DN          User Key  (r) = Recorded By, (t) = Taken By, (c) = Cosigned By    Initials Name Effective Dates    Francisco Batista OT 06/16/21 -                  Occupational Therapy Education                 Title: PT OT SLP Therapies (In Progress)     Topic: Occupational Therapy (Not Started)     Point: ADL training (Not Started)     Description:   Instruct learner(s) on proper safety adaptation and remediation techniques during self care or transfers.   Instruct in proper use of assistive devices.              Learner Progress:  Not documented in this visit.          Point: Home exercise program (Not Started)     Description:   Instruct learner(s) on appropriate technique for monitoring, assisting and/or progressing therapeutic exercises/activities.              Learner Progress:  Not documented in this visit.          Point: Precautions (Not Started)     Description:   Instruct learner(s) on prescribed precautions during self-care and functional transfers.              Learner Progress:  Not documented in this visit.          Point: Body mechanics (Not Started)     Description:   Instruct learner(s) on proper positioning and spine alignment during self-care, functional mobility activities and/or exercises.               Learner Progress:  Not documented in this visit.                                OT Recommendation and Plan    Planned Therapy Interventions (OT): activity tolerance training, adaptive equipment training, BADL retraining, ROM/therapeutic exercise, strengthening exercise, transfer/mobility retraining                    Time Calculation:      Time Calculation- OT     Row Name 04/27/22 1400 04/27/22 0930          Time Calculation- OT    OT Start Time 1400  -DN 0930  -DN     OT Stop Time 1430  -DN 1030  -DN     OT Time Calculation (min) 30 min  -DN 60 min  -DN           User Key  (r) = Recorded By, (t) = Taken By, (c) = Cosigned By    Initials Name Provider Type    Francisco Batista OT Occupational Therapist              Therapy Charges for Today     Code Description Service Date Service Provider Modifiers Qty    51399775682 HC OT SELF CARE/MGMT/TRAIN EA 15 MIN 4/26/2022 Francisco Jane OT GO 4    68903139006 HC OT THER PROC EA 15 MIN 4/26/2022 Francisco Jane OT GO 2    88479764116 HC OT THER PROC EA 15 MIN 4/27/2022 Francisco Jane OT GO 4    27223946461 HC OT THER PROC EA 15 MIN 4/27/2022 Francisco Jane OT GO 1    12206138168 HC OT NEUROMUSC RE EDUCATION EA 15 MIN 4/27/2022 Francisco Jane OT GO 1                   Francisco Jane OT  4/27/2022

## 2022-04-27 NOTE — THERAPY TREATMENT NOTE
Inpatient Rehabilitation - Occupational Therapy Treatment Note    [facility]     Patient Name: Francisco Sequeira  : 1935  MRN: [id number]    Today's Date: 2022                 Admit Date: 2022         ICD-10-CM ICD-9-CM   1. 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)   • Iron deficiency anemia   • Chronic anticoagulation   • Hemiparesis of left nondominant side as late effect of cerebral infarction (HCC)   • Rectus sheath hematoma   • Sepsis (HCC)   • Calculus of gallbladder with acute cholecystitis without obstruction   • History of Clostridium difficile infection   • Aspiration pneumonitis (HCC)   • Hematoma of iliopsoas muscle, left, initial encounter   • History of CVA (cerebrovascular accident)   • S/P laparoscopic cholecystectomy   • Anemia   • Hematoma of left iliopsoas muscle       Past Medical History:   Diagnosis Date   • Allergic rhinitis    • Aortic valve insufficiency    • Ascending aortic aneurysm (HCC)    • Atrial fibrillation (HCC)    • Bacteremia    • Calcific aortic stenosis of bicuspid valve    • Cardiac arrest (HCC)    • Cardiomyopathy (HCC)    • CKD (chronic kidney disease) stage 3, GFR 30-59 ml/min (HCC)    • Contact dermatitis due to poison ivy    • Elbow fracture    • Esophageal reflux    • GERD (gastroesophageal reflux disease)    • Head injury    • History of transfusion    • Hyperglycemia    • Hyperlipidemia    • Hypertension    • Kidney carcinoma (HCC)    • Nonischemic  cardiomyopathy (HCC)    • Renal oncocytoma    • Seasonal allergic reaction    • Sinusitis    • Syncope    • Type 2 diabetes mellitus (HCC)     uncontrolled   • Visual field defect        Past Surgical History:   Procedure Laterality Date   • AORTIC VALVE REPAIR/REPLACEMENT     • ASCENDING AORTIC ANEURYSM REPAIR W/ MECHANICAL AORTIC VALVE REPLACEMENT     • CHOLECYSTECTOMY WITH INTRAOPERATIVE CHOLANGIOGRAM N/A 3/29/2022    Procedure: Laparoscopic cholecystectomy with cholangiogram, possible open;  Surgeon: Lisa Guidry MD;  Location: [facility] MAIN OR;  Service: General;  Laterality: N/A;   • COLONOSCOPY N/A 10/28/2021    Procedure: COLONOSCOPY to cecum:  cold snare polyps,;  Surgeon: Dinesh Meza MD;  Location: [facility] ENDOSCOPY;  Service: Gastroenterology;  Laterality: N/A;  pre:  Iron deficiency anemia  post:  polyps, diverticulosis,    • COLONOSCOPY N/A 11/9/2021    Procedure: COLONOSCOPY to cecum with APC cautery of AVM and clip placement x1;  Surgeon: Pavan Rodriguez MD;  Location: [facility] ENDOSCOPY;  Service: Gastroenterology;  Laterality: N/A;  PRE - gi bleed, anemia  POST - diverticulosis, poor prep, AVM right colon   • ENDOSCOPY N/A 10/28/2021    Procedure: ESOPHAGOGASTRODUODENOSCOPY with biopsies;  Surgeon: Dinesh Meza MD;  Location: [facility] ENDOSCOPY;  Service: Gastroenterology;  Laterality: N/A;  pre:  Iron deficiency anemia  post:  duodenitis and gastritis   • EYE SURGERY  12/09/2020    cataract surgery    • NEPHRECTOMY     • OTHER SURGICAL HISTORY      elbow surgery   • OTHER SURGICAL HISTORY      right arm surgery   • PROSTATE SURGERY     • THORACENTESIS Left     diagnostic             IRF OT ASSESSMENT FLOWSHEET (last 12 hours)     IRF OT Evaluation and Treatment     Row Name 04/27/22 1218          OT Time and Intention    Document Type daily treatment  -DN     Mode of Treatment occupational therapy  -DN     Patient Effort good  -DN     Row Name 04/27/22 1218          Pain  Assessment    Pretreatment Pain Rating 0/10 - no pain  -DN     Posttreatment Pain Rating 0/10 - no pain  -DN     Row Name 04/27/22 1218          Cognition/Psychosocial    Affect/Mental Status (Cognition) WFL  -DN     Row Name 04/27/22 1218          Transfers    Bed-Chair Becker (Transfers) minimum assist (75% patient effort);nonverbal cues (demo/gesture);verbal cues  -DN     Assistive Device (Bed-Chair Transfers) wheelchair  -DN     Row Name 04/27/22 1218          Bed-Chair Transfer    Comment, (Bed-Chair Transfer) squat pivot with w/c arm out of the way  -DN     Row Name 04/27/22 1218          Shoulder (Therapeutic Exercise)    Shoulder (Therapeutic Exercise) strengthening exercise  -DN     Shoulder AROM (Therapeutic Exercise) bilateral;10 repetitions;3 sets  -DN     Shoulder Strengthening (Therapeutic Exercise) 2 lb free weight  yellow theraband, dowel tesha and arm bike at 2 sets of 2 minutes in seated position  -DN     Row Name 04/27/22 1218          Elbow/Forearm (Therapeutic Exercise)    Elbow/Forearm (Therapeutic Exercise) strengthening exercise  -DN     Elbow/Forearm AROM (Therapeutic Exercise) bilateral;10 repetitions;3 sets  -DN     Elbow/Forearm Strengthening (Therapeutic Exercise) 2 lb free weight  -DN     Row Name 04/27/22 1218          Wrist (Therapeutic Exercise)    Wrist (Therapeutic Exercise) strengthening exercise  -DN     Wrist AROM (Therapeutic Exercise) bilateral;10 repetitions;3 sets  -DN     Wrist Strengthening (Therapeutic Exercise) 2 lb free weight  -DN     Row Name 04/27/22 1218          Hand (Therapeutic Exercise)    Hand (Therapeutic Exercise) strengthening exercise  -DN     Hand Strengthening (Therapeutic Exercise) bilateral;hand gripper  -DN     Row Name 04/27/22 1218          Balance    Static Standing Balance contact guard  pt able to stand with right handed activity with LUE support during clothes pin activity  -DN     Row Name 04/27/22 1218          Positioning and Restraints     Pre-Treatment Position sitting in chair/recliner  -DN     Post Treatment Position wheelchair  -DN     In Bed sitting;call light within reach;encouraged to call for assist;exit alarm on  -DN           User Key  (r) = Recorded By, (t) = Taken By, (c) = Cosigned By    Initials Name Effective Dates    MARTHA NeFrancisco humphreyJESSICA 06/16/21 -                  Occupational Therapy Education                 Title: PT OT SLP Therapies (In Progress)     Topic: Occupational Therapy (Not Started)     Point: ADL training (Not Started)     Description:   Instruct learner(s) on proper safety adaptation and remediation techniques during self care or transfers.   Instruct in proper use of assistive devices.              Learner Progress:  Not documented in this visit.          Point: Home exercise program (Not Started)     Description:   Instruct learner(s) on appropriate technique for monitoring, assisting and/or progressing therapeutic exercises/activities.              Learner Progress:  Not documented in this visit.          Point: Precautions (Not Started)     Description:   Instruct learner(s) on prescribed precautions during self-care and functional transfers.              Learner Progress:  Not documented in this visit.          Point: Body mechanics (Not Started)     Description:   Instruct learner(s) on proper positioning and spine alignment during self-care, functional mobility activities and/or exercises.              Learner Progress:  Not documented in this visit.                                OT Recommendation and Plan    Planned Therapy Interventions (OT): activity tolerance training, adaptive equipment training, BADL retraining, ROM/therapeutic exercise, strengthening exercise, transfer/mobility retraining                    Time Calculation:      Time Calculation- OT     Row Name 04/27/22 0930             Time Calculation- OT    OT Start Time 0930  -DN      OT Stop Time 1030  -DN      OT Time Calculation (min) 60 min   -MARTHA            User Key  (r) = Recorded By, (t) = Taken By, (c) = Cosigned By    Initials Name Provider Type    Francisco Batista OT Occupational Therapist              Therapy Charges for Today     Code Description Service Date Service Provider Modifiers Qty    61635428173 HC OT SELF CARE/MGMT/TRAIN EA 15 MIN 4/26/2022 Francisco Jane OT GO 4    [id number]  OT THER PROC EA 15 MIN 4/26/2022 Francisco Jane OT GO 2    [id number]  OT THER PROC EA 15 MIN 4/27/2022 Francisco Jane OT GO 4                   Francisco Jane OT  4/27/2022

## 2022-04-28 LAB
GLUCOSE BLDC GLUCOMTR-MCNC: 139 MG/DL (ref 70–130)
GLUCOSE BLDC GLUCOMTR-MCNC: 198 MG/DL (ref 70–130)
GLUCOSE BLDC GLUCOMTR-MCNC: 222 MG/DL (ref 70–130)
GLUCOSE BLDC GLUCOMTR-MCNC: 98 MG/DL (ref 70–130)

## 2022-04-28 PROCEDURE — 63710000001 INSULIN LISPRO (HUMAN) PER 5 UNITS: Performed by: PHYSICAL MEDICINE & REHABILITATION

## 2022-04-28 PROCEDURE — 97110 THERAPEUTIC EXERCISES: CPT

## 2022-04-28 PROCEDURE — 97530 THERAPEUTIC ACTIVITIES: CPT

## 2022-04-28 PROCEDURE — 97116 GAIT TRAINING THERAPY: CPT

## 2022-04-28 PROCEDURE — 97535 SELF CARE MNGMENT TRAINING: CPT

## 2022-04-28 PROCEDURE — 82962 GLUCOSE BLOOD TEST: CPT

## 2022-04-28 RX ADMIN — FERROUS SULFATE TAB 325 MG (65 MG ELEMENTAL FE) 325 MG: 325 (65 FE) TAB at 09:42

## 2022-04-28 RX ADMIN — METOPROLOL SUCCINATE 12.5 MG: 25 TABLET, EXTENDED RELEASE ORAL at 09:42

## 2022-04-28 RX ADMIN — MAGNESIUM OXIDE 400 MG (241.3 MG MAGNESIUM) TABLET 400 MG: TABLET at 21:36

## 2022-04-28 RX ADMIN — INSULIN LISPRO 2 UNITS: 100 INJECTION, SOLUTION INTRAVENOUS; SUBCUTANEOUS at 11:58

## 2022-04-28 RX ADMIN — DIGOXIN 62.5 MCG: 125 TABLET ORAL at 11:59

## 2022-04-28 RX ADMIN — FAMOTIDINE 20 MG: 20 TABLET ORAL at 09:42

## 2022-04-28 RX ADMIN — DAKIN'S SOLUTION 0.125% (QUARTER STRENGTH) 946 ML: 0.12 SOLUTION at 05:39

## 2022-04-28 RX ADMIN — DAKIN'S SOLUTION 0.125% (QUARTER STRENGTH) 946 ML: 0.12 SOLUTION at 15:30

## 2022-04-28 RX ADMIN — Medication 1 CAPSULE: at 09:42

## 2022-04-28 RX ADMIN — NYSTATIN: 100000 POWDER TOPICAL at 21:36

## 2022-04-28 RX ADMIN — WARFARIN SODIUM 5 MG: 5 TABLET ORAL at 16:29

## 2022-04-28 RX ADMIN — NYSTATIN: 100000 POWDER TOPICAL at 09:42

## 2022-04-28 RX ADMIN — MAGNESIUM OXIDE 400 MG (241.3 MG MAGNESIUM) TABLET 400 MG: TABLET at 09:42

## 2022-04-28 RX ADMIN — DAKIN'S SOLUTION 0.125% (QUARTER STRENGTH) 946 ML: 0.12 SOLUTION at 21:22

## 2022-04-28 RX ADMIN — INSULIN LISPRO 2 UNITS: 100 INJECTION, SOLUTION INTRAVENOUS; SUBCUTANEOUS at 16:28

## 2022-04-28 RX ADMIN — BETAMETHASONE DIPROPIONATE 1 APPLICATION: 0.5 OINTMENT TOPICAL at 21:42

## 2022-04-28 RX ADMIN — ATORVASTATIN CALCIUM 40 MG: 20 TABLET, FILM COATED ORAL at 09:42

## 2022-04-28 RX ADMIN — BETAMETHASONE DIPROPIONATE 1 APPLICATION: 0.5 OINTMENT TOPICAL at 06:05

## 2022-04-28 RX ADMIN — BETAMETHASONE DIPROPIONATE 1 APPLICATION: 0.5 OINTMENT TOPICAL at 15:31

## 2022-04-28 NOTE — PROGRESS NOTES
Inpatient Rehabilitation Plan of Care Note    Plan of Care  Care Plan Reviewed - Updates as Follows    Body Systems    [RN] Integumentary(Active)  Current Status(04/28/2022): Pt has blanchable redness to coccyx with dry,  peeling skin to coccyx and buttocks. No open area noted. Excoriation to groin.  Scattered bruising. 3 glued lap sites to abd. Pt able to turn in bed.  Weekly Goal(05/03/2022): Skin will remain free from s/s infection.  Discharge Goal: Skin will remain free from further breakdown.    Performed Intervention(s)  barrier cream and powder, as ordered.  Skin assessment q shift and prn  Low air loss bed (waffle overlay not available per cpd).  Turn q2h or encourage turning.      Psychosocial    [RN] Coping/Adjustment(Active)  Current Status(04/28/2022): Patient is at risk for reduced coping r/t recent  hospitalization and new mobility issues. Pt is A/Ox4 but can be forgetful. Pt  has a supportive spouse.  Weekly Goal(05/03/2022): Pt will verbalize needs, concerns, feelings to staff as  needed.  Discharge Goal: Pt will be able to verbalize adequate coping strategies he can  use at home.    Performed Intervention(s)  Provide distraction and/or relaxation as needed.  Allow extra time for patient to verbalize needs, feelings, concerns, etc.      Safety    [RN] Potential for Injury(Active)  Current Status(04/28/2022): Pt is at increased risk for falls/injury r/t recent  hospitalization and reduced mobility.  Weekly Goal(05/03/2022): Pt will call appropriately for assistance as needed.  Discharge Goal: Pt will remain free from falls/injury.    Performed Intervention(s)  Hourly rounding.  Fall precautions: bed low and locked, patient belongings adn call light w/in  reach, nonskid socks and gait belt in use, bed and chair alarms in use.      Sphincter Control    [RN] Bladder Management(Active)  Current Status(04/28/2022): Pt can be continent and incontinent of urine.  Weekly Goal(05/03/2022): Pt will be 50%  continent.  Discharge Goal: Pt will be 75% continent of urine.    [RN] Bowel Management(Active)  Current Status(04/28/2022): Pt is reportedly incontinent of stool.  Weekly Goal(05/03/2022): Pt will be 50% continent.  Discharge Goal: Pt will be continent 75% of the time.    Performed Intervention(s)  Monitor I/O.  Toileting schedule while awake.  Assist with urinal as needed, use incontinence products as needed.    Signed by: Izabel Garnett RN

## 2022-04-28 NOTE — THERAPY TREATMENT NOTE
Inpatient Rehabilitation - Occupational Therapy Treatment Note    [facility]     Patient Name: Francisco Sequeira  : 1935  MRN: [id number]    Today's Date: 2022                 Admit Date: 2022         ICD-10-CM ICD-9-CM   1. 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)   • Iron deficiency anemia   • Chronic anticoagulation   • Hemiparesis of left nondominant side as late effect of cerebral infarction (HCC)   • Rectus sheath hematoma   • Sepsis (HCC)   • Calculus of gallbladder with acute cholecystitis without obstruction   • History of Clostridium difficile infection   • Aspiration pneumonitis (HCC)   • Hematoma of iliopsoas muscle, left, initial encounter   • History of CVA (cerebrovascular accident)   • S/P laparoscopic cholecystectomy   • Anemia   • Hematoma of left iliopsoas muscle       Past Medical History:   Diagnosis Date   • Allergic rhinitis    • Aortic valve insufficiency    • Ascending aortic aneurysm (HCC)    • Atrial fibrillation (HCC)    • Bacteremia    • Calcific aortic stenosis of bicuspid valve    • Cardiac arrest (HCC)    • Cardiomyopathy (HCC)    • CKD (chronic kidney disease) stage 3, GFR 30-59 ml/min (HCC)    • Contact dermatitis due to poison ivy    • Elbow fracture    • Esophageal reflux    • GERD (gastroesophageal reflux disease)    • Head injury    • History of transfusion    • Hyperglycemia    • Hyperlipidemia    • Hypertension    • Kidney carcinoma (HCC)    • Nonischemic  cardiomyopathy (HCC)    • Renal oncocytoma    • Seasonal allergic reaction    • Sinusitis    • Syncope    • Type 2 diabetes mellitus (HCC)     uncontrolled   • Visual field defect        Past Surgical History:   Procedure Laterality Date   • AORTIC VALVE REPAIR/REPLACEMENT     • ASCENDING AORTIC ANEURYSM REPAIR W/ MECHANICAL AORTIC VALVE REPLACEMENT     • CHOLECYSTECTOMY WITH INTRAOPERATIVE CHOLANGIOGRAM N/A 3/29/2022    Procedure: Laparoscopic cholecystectomy with cholangiogram, possible open;  Surgeon: Lisa Guidry MD;  Location: [facility] MAIN OR;  Service: General;  Laterality: N/A;   • COLONOSCOPY N/A 10/28/2021    Procedure: COLONOSCOPY to cecum:  cold snare polyps,;  Surgeon: Dinesh Meza MD;  Location: [facility] ENDOSCOPY;  Service: Gastroenterology;  Laterality: N/A;  pre:  Iron deficiency anemia  post:  polyps, diverticulosis,    • COLONOSCOPY N/A 11/9/2021    Procedure: COLONOSCOPY to cecum with APC cautery of AVM and clip placement x1;  Surgeon: Pavan Rodriguez MD;  Location: [facility] ENDOSCOPY;  Service: Gastroenterology;  Laterality: N/A;  PRE - gi bleed, anemia  POST - diverticulosis, poor prep, AVM right colon   • ENDOSCOPY N/A 10/28/2021    Procedure: ESOPHAGOGASTRODUODENOSCOPY with biopsies;  Surgeon: Dinesh Meza MD;  Location: [facility] ENDOSCOPY;  Service: Gastroenterology;  Laterality: N/A;  pre:  Iron deficiency anemia  post:  duodenitis and gastritis   • EYE SURGERY  12/09/2020    cataract surgery    • NEPHRECTOMY     • OTHER SURGICAL HISTORY      elbow surgery   • OTHER SURGICAL HISTORY      right arm surgery   • PROSTATE SURGERY     • THORACENTESIS Left     diagnostic             IRF OT ASSESSMENT FLOWSHEET (last 12 hours)     IRF OT Evaluation and Treatment     Row Name 04/28/22 1210          OT Time and Intention    Document Type daily treatment  -DN     Mode of Treatment occupational therapy  -DN     Patient Effort good  -DN     Row Name 04/28/22 1210          Pain  Assessment    Pretreatment Pain Rating 0/10 - no pain  -DN     Posttreatment Pain Rating 0/10 - no pain  -DN     Row Name 04/28/22 1210          Cognition/Psychosocial    Affect/Mental Status (Cognition) WFL  -DN     Personal Safety Interventions fall prevention program maintained;gait belt  -DN     Cognitive Function safety deficit  -DN     Row Name 04/28/22 1210          Bathing    Gentry Level (Bathing) bathing skills;minimum assist (75% patient effort);verbal cues  -DN     Assistive Device (Bathing) hand held shower spray hose;grab bar/tub rail;tub bench  -DN     Position (Bathing) supported sitting;supported standing  -DN     Comment (Bathing) one person to stand, pt holding on to grab bar with both hand, therapist assist with holding up and washing pt bottom  -DN     Row Name 04/28/22 1210          Upper Body Dressing    Gentry Level (Upper Body Dressing) upper body dressing skills;supervision  -DN     Position (Upper Body Dressing) supported sitting  -DN     Row Name 04/28/22 1210          Lower Body Dressing    Gentry Level (Lower Body Dressing) doff;don;pants/bottoms;socks;shoes/slippers;underwear;moderate assist (50% patient effort)  -DN     Position (Lower Body Dressing) supported standing;supported sitting  -DN     Set-up Assistance (Lower Body Dressing) obtain clothing  -DN     Comment (Lower Body Dressing) one person to stand and pt to have BUE support on RWX  -DN     Row Name 04/28/22 1210          Grooming    Gentry Level (Grooming) grooming skills;deodorant application;hair care, combing/brushing;oral care regimen;shave face;set up  -DN     Position (Grooming) sink side;supported sitting  -DN     Row Name 04/28/22 1210          Toileting    Gentry Level (Toileting) toileting skills;adjust/manage clothing;dependent (less than 25% patient effort)  -DN     Position (Toileting) supported sitting;supported standing  -DN     Set-up Assistance (Toileting) change pad/brief  -DN      Comment (Toileting) pt inc in shower of BM dependent with clean up  -DN     Row Name 04/28/22 1210          Transfers    Bed-Chair Elk (Transfers) minimum assist (75% patient effort);verbal cues  -DN     Assistive Device (Bed-Chair Transfers) wheelchair  -DN     Elk Level (Shower Transfer) moderate assist (50% patient effort);verbal cues;nonverbal cues (demo/gesture)  -DN     Assistive Device (Shower Transfer) tub bench;wheelchair  -DN     Row Name 04/28/22 1210          Bed-Chair Transfer    Comment, (Bed-Chair Transfer) squat pivot vc for technique  -DN     Row Name 04/28/22 1210          Shower Transfer    Type (Shower Transfer) squat pivot  -DN     Row Name 04/28/22 1210          Shoulder (Therapeutic Exercise)    Shoulder (Therapeutic Exercise) strengthening exercise  -DN     Shoulder AROM (Therapeutic Exercise) bilateral;10 repetitions;2 sets  -DN     Shoulder Strengthening (Therapeutic Exercise) 2 lb free weight  -DN     Row Name 04/28/22 1210          Elbow/Forearm (Therapeutic Exercise)    Elbow/Forearm (Therapeutic Exercise) strengthening exercise  -DN     Elbow/Forearm AROM (Therapeutic Exercise) bilateral;10 repetitions;2 sets  -DN     Elbow/Forearm Strengthening (Therapeutic Exercise) 2 lb free weight  -DN     Row Name 04/28/22 1210          Wrist (Therapeutic Exercise)    Wrist (Therapeutic Exercise) strengthening exercise  -DN     Wrist AROM (Therapeutic Exercise) bilateral;10 repetitions;2 sets  -DN     Wrist Strengthening (Therapeutic Exercise) 2 lb free weight  -DN     Row Name 04/28/22 1210          Hand (Therapeutic Exercise)    Hand (Therapeutic Exercise) strengthening exercise  -DN     Hand Strengthening (Therapeutic Exercise) bilateral;hand gripper  -DN     Row Name 04/28/22 1210          Positioning and Restraints    Pre-Treatment Position in bed  -DN     Post Treatment Position wheelchair  -DN     In Bed call light within reach;sitting;encouraged to call for assist;exit  alarm on  -DN           User Key  (r) = Recorded By, (t) = Taken By, (c) = Cosigned By    Initials Name Effective Dates    Francisco Batista OT 06/16/21 -                  Occupational Therapy Education                 Title: PT OT SLP Therapies (In Progress)     Topic: Occupational Therapy (Not Started)     Point: ADL training (Not Started)     Description:   Instruct learner(s) on proper safety adaptation and remediation techniques during self care or transfers.   Instruct in proper use of assistive devices.              Learner Progress:  Not documented in this visit.          Point: Home exercise program (Not Started)     Description:   Instruct learner(s) on appropriate technique for monitoring, assisting and/or progressing therapeutic exercises/activities.              Learner Progress:  Not documented in this visit.          Point: Precautions (Not Started)     Description:   Instruct learner(s) on prescribed precautions during self-care and functional transfers.              Learner Progress:  Not documented in this visit.          Point: Body mechanics (Not Started)     Description:   Instruct learner(s) on proper positioning and spine alignment during self-care, functional mobility activities and/or exercises.              Learner Progress:  Not documented in this visit.                                OT Recommendation and Plan    Planned Therapy Interventions (OT): activity tolerance training, adaptive equipment training, BADL retraining, ROM/therapeutic exercise, strengthening exercise, transfer/mobility retraining                    Time Calculation:      Time Calculation- OT     Row Name 04/28/22 0900             Time Calculation- OT    OT Start Time 0900  -DN      OT Stop Time 1000  -DN      OT Time Calculation (min) 60 min  -DN            User Key  (r) = Recorded By, (t) = Taken By, (c) = Cosigned By    Initials Name Provider Type    Francisco Batista OT Occupational Therapist              Therapy  Charges for Today     Code Description Service Date Service Provider Modifiers Qty    11751682595 HC OT THER PROC EA 15 MIN 4/27/2022 Francisco Jane OT GO 4    81521538510 HC OT THER PROC EA 15 MIN 4/27/2022 Francisco Jane OT GO 1    80879213178 HC OT NEUROMUSC RE EDUCATION EA 15 MIN 4/27/2022 Francisco Jane OT GO 1    85916797205 HC OT SELF CARE/MGMT/TRAIN EA 15 MIN 4/28/2022 Francisco Jane OT GO 3    26704793990 HC OT THER PROC EA 15 MIN 4/28/2022 Francisco Jane OT GO 1                   Francisco Jane OT  4/28/2022

## 2022-04-28 NOTE — CONSULTS
Hospital Course: Seen at [facility] in the presence of the nursing staff.    History: Multiple month history of nonhealing lesion of the upper part of the back between the shoulder areas.  He has had topical use of mupirocin ointment with no benefit.  Apparently the lesion has not been biopsied, but it does appear to be traumatized by scratching or some clothing rubbing across it.  It has some discharge as well present.    Exam: Large oval patch with an erythematous base and some areas of slight erosion which appear to be either excoriation or spontaneous occurrence.  No evidence of infection now however.    Diagnosis: Lichen simplex chronicus would be the main possibility with some excoriation.  Some element of contact dermatitis may also be superimposed.  However, cannot rule out a either Bowen's disease or superficial basal cell carcinoma, cutaneous T-cell lymphoma, lupus erythematosus, deep fungus infection, or diabetic dermopathy/necrobiosis lipoidica.  A biopsy at this time would be a somewhat confusing because of all the information, so I will try reducing: The information and see if anything remains.  Typically the lichen simplex chronicus will improve gradually over the course of time, but a omalignancy will not.    Treatment: Dakin's solution as a cool to cold compress 3 times a day and follow with betamethasone dipropionate ointment 0.05% 3 times a day.  Consider for biopsy if no change.      Patient was wearing facemask when I entered the room and throughout our encounter.  I wore protective equipment throughout this patient encounter including a face mask, gloves and protective eyewear.  Hand hygiene was performed before donning protective equipment and after removal when leaving the room.          DICTATED UTILIZING DRAGON DICTATION

## 2022-04-28 NOTE — PROGRESS NOTES
LOS: 8 days   Patient Care Team:  Rubin Hinkle APRN as PCP - General (Family Medicine)  Audra Vazquez RODDY as Pharmacist  Vasquez Niño PharmD as Pharmacist (Pharmacy)  Tatiana Tinoco MD as Consulting Physician (Gastroenterology)      LAURA XIE  1935    Diagnoses    1. 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  Hypertension.  Impaired mobility/impaired self-care  Chronic kidney disease stage III  Hyperglycemia-Lantus  Anemia-ferrous sulfate  Recent C. difficile colitis-completed vancomycin taper      Subjective   He describes his weakness in the left leg about the same.  Still weak at the hip and the knee extensor.    Worked on ambulation with a knee immobilizer  Discussed will likely need dynamic knee extension brace with tension bands on post anterior to the knee joint with locking mechanism initially, and then unlocking as he gains better proficiency utilizing tension bands to extend the knee      Objective     Vitals:    04/28/22 1208   BP: 114/59   Pulse: 70   Resp: 16   Temp: 98.3 °F (36.8 °C)   SpO2: 95%       PHYSICAL EXAM:      MENTAL STATUS -  AWAKE / ALERT  HEENT-    LUNGS -normal respiration  HEART-irregular   Prosthetic click  ABD -soft nontender  EXT - NO EDEMA OR CYANOSIS  NEURO -   MOTOR EXAM - RUE/RLE 5/5. LUE 5/5.    Left hip flexion 2/5, adduction takes resistance, hip extension-active, knee extension 0/5, knee flexion active, ankle dorsiflexion 4/5, EHL 4/5, ankle plantarflexion takes resistance         MEDICATIONS  Scheduled Meds:atorvastatin, 40 mg, Oral,  Daily  betamethasone dipropionate, 1 application, Topical, TID  digoxin, 62.5 mcg, Oral, Daily  famotidine, 20 mg, Oral, Daily  ferrous sulfate, 325 mg, Oral, Every Other Day  insulin glargine, 10 Units, Subcutaneous, Nightly  insulin lispro, 0-9 Units, Subcutaneous, TID AC  lactobacillus acidophilus, 1 capsule, Oral, Daily  magnesium oxide, 400 mg, Oral, BID  metoprolol succinate XL, 12.5 mg, Oral, Daily  nystatin, , Topical, Q12H  warfarin, 5 mg, Oral, Once per day on Sun Tue Thu Sat  warfarin, 7.5 mg, Oral, Once per day on Mon Wed Fri      Continuous Infusions:Pharmacy to dose warfarin,       PRN Meds:.•  acetaminophen **OR** [DISCONTINUED] acetaminophen **OR** [DISCONTINUED] acetaminophen  •  calcium carbonate  •  dextrose  •  dextrose  •  diphenhydrAMINE  •  glucagon (human recombinant)  •  hydrocortisone-bacitracin-zinc oxide-nystatin  •  nitroglycerin  •  ondansetron **OR** ondansetron  •  Pharmacy to dose warfarin  •  Sodium Hypochlorite 0.0625 % (Dakin's 1/8th Strength) topical solution      RESULTS  Glucose   Date/Time Value Ref Range Status   04/28/2022 1605 198 (H) 70 - 130 mg/dL Final     Comment:     Meter: [id number] : [id number] [facility]   04/28/2022 1045 222 (H) 70 - 130 mg/dL Final     Comment:     M[facility]: [id number] : [id number] [facility]   04/28/2022 0609 98 70 - 130 mg/dL Final     Comment:     Meter: [id number] : [id number] [facility]   04/27/2022 2043 139 (H) 70 - 130 mg/dL Final     Comment:     Meter: [id number] : [id number] [facility]   04/27/2022 1632 130 70 - 130 mg/dL Final     Comment:     Meter: [id number] : [id number] [facility]   04/27/2022 1051 152 (H) 70 - 130 mg/dL Final     Comment:     Meter: [id number] : [id number] [facility]   04/27/2022 0516 101 70 - 130 mg/dL Final     Comment:     Meter: [id number] : [id number] [facility]LE[facility]   04/26/2022 2010 221 (H) 70 - 130 mg/dL Final     Comment:     Meter:  [id number] : [id number] Mark[facility] ALEJO     Results from last 7 days   Lab Units 04/26/22  0749 04/25/22  1037 04/24/22  0433   WBC 10*3/mm3 4.01 5.41 4.60   HEMOGLOBIN g/dL 9.6* 10.3* 9.2*   HEMATOCRIT % 30.7* 33.6* 30.8*   PLATELETS 10*3/mm3 209 265 208     Results from last 7 days   Lab Units 04/25/22  1037 04/22/22  0926   SODIUM mmol/L 134* 135*   POTASSIUM mmol/L 4.4 4.6   CHLORIDE mmol/L 100 101   CO2 mmol/L 22.2 24.3   BUN mg/dL 22 14   CREATININE mg/dL 1.32* 1.18   CALCIUM mg/dL 8.5* 8.6   GLUCOSE mg/dL 193* 156*       Results from last 7 days   Lab Units 04/27/22  1041 04/26/22  0749 04/25/22  1037   INR  3.49* 3.46* 3.23*         Assessment/Plan     Hematoma of left iliopsoas muscle      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  The hip weakness is probably commendation of hematoma and iliopsoas as well as nerve impingement.  He is weaker with knee extension compared to hip adduction which would point to also component of femoral nerve palsy.     Reviewed features of upper lumbosacral plexopathy greater than lower lumbosacral plexopathy with the patient and his wife, reviewed timeframe of recovery is typically measured in months and may be incomplete, and reviewed need for bracing which will be determined and see how he progresses with his rehabilitation program.  Reviewed different styles of knee orthosis including dynamic knee extension versus lockable knee unit depending on level of stability he needs.    Discussed will likely need dynamic knee extension brace with tension bands on post anterior to the knee joint with locking mechanism initially, and then unlocking as he gains better proficiency utilizing tension bands to extend the knee        History of mechanical heart valve-  Mechanical aortic valve  replacement  Prior CVA with residual left-sided weakness (INR at 2.2)  Chronic anticoagulation with goal INR of 3 - 3.5  INR goal 3.0-3.5 secondary to previous stroke-pharmacy managing  April 21-INR 2.92.  Discontinue Lovenox.  Continue Coumadin  April 22-INR trended back down to 2.5 to.  Had been on Coumadin 7.5 mg through April 19, received 10 mg on April 20, increased INR from 2.07 to 2.92.  Given the rate of  increase in INR, Coumadin was held yesterday.  INR trended back down to 2.52 today.  Anticipate Coumadin 10 mg dose today.  Will recheck INR this afternoon around 3:30 PM and if trend lower, will give Lovenox 70 mg subcutaneous x1 and recheck INR in the a.m.. Concern would be for re-bleed as previously bleed on ASA and Lovenox to coumadin transition when INR was 1.6. Previous stroke when INR was 2.2.   April 25-INR 3.2  April 27-INR 3.49    INR goal 3.0-3.5     Atrial fibrillation-digoxin/metoprolol  Nonischemic cardiomyopathy.  Hypertension.     Impaired mobility/impaired self-care     History of lower GI bleed with hematochezia in November 2021  Right rectus sheath hematoma in November 2021     Chronic kidney disease stage III  History of renal cell carcinoma and right nephrectomy      Hyperglycemia-Lantus  April 21-blood glucose low this morning and decrease Lantus     Anemia-ferrous sulfate     Recent C. difficile colitis-completed vancomycin taper    Dermatologic-chronic upper back lesion-seen by dermatology-Lichen simplex chronicus would be the main possibility with some excoriation.  Some element of contact dermatitis may also be superimposed.  However, cannot rule out a either Bowen's disease or superficial basal cell carcinoma, cutaneous T-cell lymphoma, lupus erythematosus, deep fungus infection, or diabetic dermopathy/necrobiosis lipoidica.  A biopsy at this time would be a somewhat confusing because of all the information, so I will try reducing: The information and see if anything remains.   Typically the lichen simplex chronicus will improve gradually over the course of time, but a omalignancy will not.   Treatment: Dakin's solution as a cool to cold compress 3 times a day and follow with betamethasone dipropionate ointment 0.05% 3 times a day.  Consider for biopsy if no change.    Team conference-April 26-toilet transfers moderate assist squat pivot.  Gait 14 feet mod assist of 2, left knee blocked.  Transfers moderate assist squat pivot.  Bed mobility standby assist.  Bathing moderate assist.  Lower body dressing moderate-max assist.  Upper body dressing standby assist.  Grooming standby assist.  Toileting max assist of 1-2 persons.  Continent/incontinent of bowel and bladder.  Estimate length of stay-3 weeks     Now admit for comprehensive acute inpatient rehabilitation .  This would be an interdisciplinary program with physical therapy 1.5 hour,  occupational therapy 1.5 hour,  5 days a week.  Rehabilitation nursing for carryover, monitoring of cardiac and neurologic   status, bowel and bladder, and skin  Ongoing physician follow-up.  Weekly team conferences.  Goals are to achieve a level of contact-guard assist with  mobility and self-care and improved strength.   Rehabilitation prognosis indeterminate.  Medical prognosis indeterminate.  Estimated length of stay is approximately 2 weeks, but is only an estimation.      The patient's functional status and clinical status is unchanged from preadmission assessment and the patient continues appropriate for acute inpatient rehabilitation.  Goal is for home with outpatient   therapies.  Barrier to discharge: Impaired mobility and self-care- work on strength, transfers, balance, progressive ambulation, bracing, activities of daily living to overcome.                 Grant Andrade MD      During rounds, used appropriate personal protective equipment including mask and gloves.  Additional gown if indicated.  Mask used was standard procedure mask.  Appropriate PPE was worn during the entire visit.  Hand hygiene was completed before and after.

## 2022-04-28 NOTE — PLAN OF CARE
Goal Outcome Evaluation:      Dr. [facility] here for consult, & new orders received; Re - open sore/abrasion to upper back. Slept well. Meds with applesauce. Incontinent of bladder tonight. No complaints. Glucose 139, refused  Lantus tonight, afraid glucose would be to low in am.

## 2022-04-28 NOTE — THERAPY TREATMENT NOTE
Inpatient Rehabilitation - Occupational Therapy Treatment Note    [facility]     Patient Name: Francisco Sequeira  : 1935  MRN: [id number]    Today's Date: 2022                 Admit Date: 2022         ICD-10-CM ICD-9-CM   1. 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)   • Iron deficiency anemia   • Chronic anticoagulation   • Hemiparesis of left nondominant side as late effect of cerebral infarction (HCC)   • Rectus sheath hematoma   • Sepsis (HCC)   • Calculus of gallbladder with acute cholecystitis without obstruction   • History of Clostridium difficile infection   • Aspiration pneumonitis (HCC)   • Hematoma of iliopsoas muscle, left, initial encounter   • History of CVA (cerebrovascular accident)   • S/P laparoscopic cholecystectomy   • Anemia   • Hematoma of left iliopsoas muscle       Past Medical History:   Diagnosis Date   • Allergic rhinitis    • Aortic valve insufficiency    • Ascending aortic aneurysm (HCC)    • Atrial fibrillation (HCC)    • Bacteremia    • Calcific aortic stenosis of bicuspid valve    • Cardiac arrest (HCC)    • Cardiomyopathy (HCC)    • CKD (chronic kidney disease) stage 3, GFR 30-59 ml/min (HCC)    • Contact dermatitis due to poison ivy    • Elbow fracture    • Esophageal reflux    • GERD (gastroesophageal reflux disease)    • Head injury    • History of transfusion    • Hyperglycemia    • Hyperlipidemia    • Hypertension    • Kidney carcinoma (HCC)    • Nonischemic  cardiomyopathy (HCC)    • Renal oncocytoma    • Seasonal allergic reaction    • Sinusitis    • Syncope    • Type 2 diabetes mellitus (HCC)     uncontrolled   • Visual field defect        Past Surgical History:   Procedure Laterality Date   • AORTIC VALVE REPAIR/REPLACEMENT     • ASCENDING AORTIC ANEURYSM REPAIR W/ MECHANICAL AORTIC VALVE REPLACEMENT     • CHOLECYSTECTOMY WITH INTRAOPERATIVE CHOLANGIOGRAM N/A 3/29/2022    Procedure: Laparoscopic cholecystectomy with cholangiogram, possible open;  Surgeon: Lisa Guidry MD;  Location: [facility] MAIN OR;  Service: General;  Laterality: N/A;   • COLONOSCOPY N/A 10/28/2021    Procedure: COLONOSCOPY to cecum:  cold snare polyps,;  Surgeon: Dinesh Meza MD;  Location: [facility] ENDOSCOPY;  Service: Gastroenterology;  Laterality: N/A;  pre:  Iron deficiency anemia  post:  polyps, diverticulosis,    • COLONOSCOPY N/A 11/9/2021    Procedure: COLONOSCOPY to cecum with APC cautery of AVM and clip placement x1;  Surgeon: Pavan Rodriguez MD;  Location: [facility] ENDOSCOPY;  Service: Gastroenterology;  Laterality: N/A;  PRE - gi bleed, anemia  POST - diverticulosis, poor prep, AVM right colon   • ENDOSCOPY N/A 10/28/2021    Procedure: ESOPHAGOGASTRODUODENOSCOPY with biopsies;  Surgeon: Dinesh Meza MD;  Location: [facility] ENDOSCOPY;  Service: Gastroenterology;  Laterality: N/A;  pre:  Iron deficiency anemia  post:  duodenitis and gastritis   • EYE SURGERY  12/09/2020    cataract surgery    • NEPHRECTOMY     • OTHER SURGICAL HISTORY      elbow surgery   • OTHER SURGICAL HISTORY      right arm surgery   • PROSTATE SURGERY     • THORACENTESIS Left     diagnostic             IRF OT ASSESSMENT FLOWSHEET (last 12 hours)     IRF OT Evaluation and Treatment     Row Name 04/28/22 1448 04/28/22 1210       OT Time and Intention    Document Type daily treatment  -DN daily treatment  -DN    Mode of Treatment occupational therapy  -DN occupational therapy  -DN    Patient Effort  good  -DN good  -DN    Row Name 04/28/22 1210          Pain Assessment    Pretreatment Pain Rating 0/10 - no pain  -DN     Posttreatment Pain Rating 0/10 - no pain  -DN     Row Name 04/28/22 1210          Cognition/Psychosocial    Affect/Mental Status (Cognition) WFL  -DN     Personal Safety Interventions fall prevention program maintained;gait belt  -DN     Cognitive Function safety deficit  -DN     Row Name 04/28/22 1210          Bathing    [place] Level (Bathing) bathing skills;minimum assist (75% patient effort);verbal cues  -DN     Assistive Device (Bathing) hand held shower spray hose;grab bar/tub rail;tub bench  -DN     Position (Bathing) supported sitting;supported standing  -DN     Comment (Bathing) one person to stand, pt holding on to grab bar with both hand, therapist assist with holding up and washing pt bottom  -DN     Row Name 04/28/22 1210          Upper Body Dressing    Wells Level (Upper Body Dressing) upper body dressing skills;supervision  -DN     Position (Upper Body Dressing) supported sitting  -DN     Row Name 04/28/22 1210          Lower Body Dressing    Wells Level (Lower Body Dressing) doff;don;pants/bottoms;socks;shoes/slippers;underwear;moderate assist (50% patient effort)  -DN     Position (Lower Body Dressing) supported standing;supported sitting  -DN     Set-up Assistance (Lower Body Dressing) obtain clothing  -DN     Comment (Lower Body Dressing) one person to stand and pt to have BUE support on RWX  -DN     Row Name 04/28/22 1210          Grooming    Wells Level (Grooming) grooming skills;deodorant application;hair care, combing/brushing;oral care regimen;shave face;set up  -DN     Position (Grooming) sink side;supported sitting  -DN     Row Name 04/28/22 1210          Toileting    Wells Level (Toileting) toileting skills;adjust/manage clothing;dependent (less than 25% patient effort)  -DN     Position (Toileting) supported sitting;supported standing   -DN     Set-up Assistance (Toileting) change pad/brief  -DN     Comment (Toileting) pt inc in shower of BM dependent with clean up  -DN     Row Name 04/28/22 1210          Transfers    Bed-Chair [place] (Transfers) minimum assist (75% patient effort);verbal cues  -DN     Assistive Device (Bed-Chair Transfers) wheelchair  -DN     [place] Level (Shower Transfer) moderate assist (50% patient effort);verbal cues;nonverbal cues (demo/gesture)  -DN     Assistive Device (Shower Transfer) tub bench;wheelchair  -DN     Row Name 04/28/22 1210          Bed-Chair Transfer    Comment, (Bed-Chair Transfer) squat pivot vc for technique  -DN     Row Name 04/28/22 1210          Shower Transfer    Type (Shower Transfer) squat pivot  -DN     Row Name 04/28/22 1448 04/28/22 1210       Shoulder (Therapeutic Exercise)    Shoulder (Therapeutic Exercise) strengthening exercise;pulley exercises  -DN strengthening exercise  -DN    Shoulder AROM (Therapeutic Exercise) bilateral;10 repetitions;3 sets  -DN bilateral;10 repetitions;2 sets  -DN    Shoulder Strengthening (Therapeutic Exercise) 2 lb free weight;resistance band;yellow  -DN 2 lb free weight  -DN    Shoulder Pulley Exercises (Therapeutic Exercise) 3 sets;10 repetitions  -DN --    Row Name 04/28/22 1448 04/28/22 1210       Elbow/Forearm (Therapeutic Exercise)    Elbow/Forearm (Therapeutic Exercise) strengthening exercise  -DN strengthening exercise  -DN    Elbow/Forearm AROM (Therapeutic Exercise) bilateral;10 repetitions;3 sets  -DN bilateral;10 repetitions;2 sets  -DN    Elbow/Forearm Strengthening (Therapeutic Exercise) 2 lb free weight  -DN 2 lb free weight  -DN    Row Name 04/28/22 1448 04/28/22 1210       Wrist (Therapeutic Exercise)    Wrist (Therapeutic Exercise) strengthening exercise  -DN strengthening exercise  -DN    Wrist AROM (Therapeutic Exercise) bilateral;10 repetitions;3 sets  -DN bilateral;10 repetitions;2 sets  -DN    Wrist Strengthening (Therapeutic  Exercise) 2 lb free weight  -DN 2 lb free weight  -DN    Row Name 04/28/22 1448 04/28/22 1210       Hand (Therapeutic Exercise)    Hand (Therapeutic Exercise) strengthening exercise  -DN strengthening exercise  -DN    Hand Strengthening (Therapeutic Exercise) bilateral;hand gripper  -DN bilateral;hand gripper  -DN    Row Name 04/28/22 1448 04/28/22 1210       Positioning and Restraints    Pre-Treatment Position sitting in chair/recliner  -DN in bed  -DN    Post Treatment Position wheelchair  -DN wheelchair  -DN    In Bed sitting;call light within reach;encouraged to call for assist;with family/caregiver  -DN call light within reach;sitting;encouraged to call for assist;exit alarm on  -DN          User Key  (r) = Recorded By, (t) = Taken By, (c) = Cosigned By    Initials Name Effective Dates    Francisco Batista OT 06/16/21 -                  Occupational Therapy Education                 Title: PT OT SLP Therapies (In Progress)     Topic: Occupational Therapy (Not Started)     Point: ADL training (Not Started)     Description:   Instruct learner(s) on proper safety adaptation and remediation techniques during self care or transfers.   Instruct in proper use of assistive devices.              Learner Progress:  Not documented in this visit.          Point: Home exercise program (Not Started)     Description:   Instruct learner(s) on appropriate technique for monitoring, assisting and/or progressing therapeutic exercises/activities.              Learner Progress:  Not documented in this visit.          Point: Precautions (Not Started)     Description:   Instruct learner(s) on prescribed precautions during self-care and functional transfers.              Learner Progress:  Not documented in this visit.          Point: Body mechanics (Not Started)     Description:   Instruct learner(s) on proper positioning and spine alignment during self-care, functional mobility activities and/or exercises.              Learner  Progress:  Not documented in this visit.                                OT Recommendation and Plan    Planned Therapy Interventions (OT): activity tolerance training, adaptive equipment training, BADL retraining, ROM/therapeutic exercise, strengthening exercise, transfer/mobility retraining                    Time Calculation:      Time Calculation- OT     Row Name 04/28/22 1400 04/28/22 0900          Time Calculation- OT    OT Start Time 1400  -DN 0900  -DN     OT Stop Time 1430  -DN 1000  -DN     OT Time Calculation (min) 30 min  -DN 60 min  -DN           User Key  (r) = Recorded By, (t) = Taken By, (c) = Cosigned By    Initials Name Provider Type    Francisco Batista OT Occupational Therapist              Therapy Charges for Today     Code Description Service Date Service Provider Modifiers Qty    31623737111 HC OT THER PROC EA 15 MIN 4/27/2022 Francisco Jane OT GO 4    11931224844 HC OT THER PROC EA 15 MIN 4/27/2022 Francisco Jane OT GO 1    63696124504 HC OT NEUROMUSC RE EDUCATION EA 15 MIN 4/27/2022 Francisco Jane OT GO 1    52417985832 HC OT SELF CARE/MGMT/TRAIN EA 15 MIN 4/28/2022 Francisco Jane OT GO 3    40604560671 HC OT THER PROC EA 15 MIN 4/28/2022 Francisco Jane OT GO 1    84341859100 HC OT THER PROC EA 15 MIN 4/28/2022 Francisco Jane OT GO 2                   Francisco Jane OT  4/28/2022

## 2022-04-28 NOTE — NURSING NOTE
"Diabetes Education  Assessment/Teaching    Patient Name:  Francisco Sequeira  YOB: 1935  MRN: [id number]  Admit Date:  4/20/2022      Assessment Date:  4/28/2022  Flowsheet Row Most Recent Value   General Information     Referral From: Database. Meet with 87 y/o at bedside to assess needs for DM ed. Explain role of DM educator.    Height 182.9 cm (72\")   Height Method Stated   Weight 60.2 kg (132 lb 11.5 oz)   Weight Method Bed scale   Diabetes History    What type of diabetes do you have? Type 2   Length of Diabetes Diagnosis -- at least 5 yrs.   Education Preferences    Barriers to Learning --no barriers apparent at this time.   Assessment Topics    Taking Medication - Assessment Competent -Humalog/insulin via v-go as an outpt.   Healthy Coping - Assessment Competent -supportive spouse at bedside.   Monitoring - Assessment Competent   DM Goals    Contact Plan Follow-up medical care with PCP.          Flowsheet Row Most Recent Value   DM Education Needs    Medication Humalog Insulin   Healthy Coping Appropriate   Discharge Plan Home   Teaching Method Discussion   Patient Response Verbalized understanding      Other Comments: No new educational needs id'd by pt spouse at this time.  Electronically signed by:  Kaela Kirk, RN, BSN  04/28/22 15:15 EDT  "

## 2022-04-28 NOTE — PROGRESS NOTES
Hospital Course: Seen at [facility] in the presence of the nursing staff.  He was seen in the company of his wife.    History: He has significant relief of the irritation and pruritus of his upper back lesion with the initial application of his medication.  He has had no new symptoms.    Exam: He has significant improvement already with decrease in erythema, reduction in some of the crusting visible, and less discharge on his bandage.    Diagnosis: Consider lichen simplex chronicus in this setting with a persistent irritated patch-like lesion on the upper back between his shoulders.  Other parts of the differential diagnosis could include contact dermatitis as he appeared to have some irritation from the regular use of mupirocin ointment.  Other topical medications over-the-counter have also been applied unsuccessfully.  Must consider other diagnoses if there is persistent symptoms including skin malignancies such as superficial basal cell carcinoma and Bowen's disease/squamous cell carcinoma in situ.  Also in the differential diagnosis would include psoriasis vulgaris, pityriasis rubra pilaris, lichen planus, lupus erythematosus, and even cutaneous T-cell lymphoma    Treatment: Dakin's solution as a cool to cold compress 3 times a day followed by betamethasone dipropionate ointment 0.05% sparingly 3 times a day.  Stop the mupirocin ointment as there is no signs of any infection at this time.  He has agreed to a biopsy of this area if the lesion does not continue to resolve.      Patient was wearing facemask when I entered the room and throughout our encounter.  I wore protective equipment throughout this patient encounter including a face mask, gloves and protective eyewear.  Hand hygiene was performed before donning protective equipment and after removal when leaving the room.          DICTATED UTILIZING DRAGON DICTATION

## 2022-04-28 NOTE — THERAPY PROGRESS REPORT/RE-CERT
Inpatient Rehabilitation - Physical Therapy Progress Note       [facility]     Patient Name: Francisco Sequeira  : 1935  MRN: [id number]    Today's Date: 2022                    Admit Date: 2022      Visit Dx:     ICD-10-CM ICD-9-CM   1. 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)   • Iron deficiency anemia   • Chronic anticoagulation   • Hemiparesis of left nondominant side as late effect of cerebral infarction (HCC)   • Rectus sheath hematoma   • Sepsis (HCC)   • Calculus of gallbladder with acute cholecystitis without obstruction   • History of Clostridium difficile infection   • Aspiration pneumonitis (HCC)   • Hematoma of iliopsoas muscle, left, initial encounter   • History of CVA (cerebrovascular accident)   • S/P laparoscopic cholecystectomy   • Anemia   • Hematoma of left iliopsoas muscle       Past Medical History:   Diagnosis Date   • Allergic rhinitis    • Aortic valve insufficiency    • Ascending aortic aneurysm (HCC)    • Atrial fibrillation (HCC)    • Bacteremia    • Calcific aortic stenosis of bicuspid valve    • Cardiac arrest (HCC)    • Cardiomyopathy (HCC)    • CKD (chronic kidney disease) stage 3, GFR 30-59 ml/min (HCC)    • Contact dermatitis due to poison ivy    • Elbow fracture    • Esophageal reflux    • GERD (gastroesophageal reflux disease)    • Head injury    • History of transfusion    • Hyperglycemia    • Hyperlipidemia    • Hypertension    • Kidney carcinoma (HCC)    •  Nonischemic cardiomyopathy (HCC)    • Renal oncocytoma    • Seasonal allergic reaction    • Sinusitis    • Syncope    • Type 2 diabetes mellitus (HCC)     uncontrolled   • Visual field defect        Past Surgical History:   Procedure Laterality Date   • AORTIC VALVE REPAIR/REPLACEMENT     • ASCENDING AORTIC ANEURYSM REPAIR W/ MECHANICAL AORTIC VALVE REPLACEMENT     • CHOLECYSTECTOMY WITH INTRAOPERATIVE CHOLANGIOGRAM N/A 3/29/2022    Procedure: Laparoscopic cholecystectomy with cholangiogram, possible open;  Surgeon: Lisa Guidry MD;  Location: [facility] MAIN OR;  Service: General;  Laterality: N/A;   • COLONOSCOPY N/A 10/28/2021    Procedure: COLONOSCOPY to cecum:  cold snare polyps,;  Surgeon: Dinesh Meza MD;  Location: [facility] ENDOSCOPY;  Service: Gastroenterology;  Laterality: N/A;  pre:  Iron deficiency anemia  post:  polyps, diverticulosis,    • COLONOSCOPY N/A 11/9/2021    Procedure: COLONOSCOPY to cecum with APC cautery of AVM and clip placement x1;  Surgeon: Pavan Rodriguez MD;  Location: [facility] ENDOSCOPY;  Service: Gastroenterology;  Laterality: N/A;  PRE - gi bleed, anemia  POST - diverticulosis, poor prep, AVM right colon   • ENDOSCOPY N/A 10/28/2021    Procedure: ESOPHAGOGASTRODUODENOSCOPY with biopsies;  Surgeon: Dinesh Meza MD;  Location: [facility] ENDOSCOPY;  Service: Gastroenterology;  Laterality: N/A;  pre:  Iron deficiency anemia  post:  duodenitis and gastritis   • EYE SURGERY  12/09/2020    cataract surgery    • NEPHRECTOMY     • OTHER SURGICAL HISTORY      elbow surgery   • OTHER SURGICAL HISTORY      right arm surgery   • PROSTATE SURGERY     • THORACENTESIS Left     diagnostic       PT ASSESSMENT (last 12 hours)     IRF PT Evaluation and Treatment     Row Name 04/28/22 1450          PT Time and Intention    Document Type progress note  -MS     Mode of Treatment physical therapy  -MS     Patient/Family/Caregiver Comments/Observations AM and PM: sitting up in wc, NAD,  exit alarm on  -MS     Total Minutes, Physical Therapy 90  -MS     Row Name 04/28/22 1450          General Information    Existing Precautions/Restrictions fall  L knee buckling  -MS     Limitations/Impairments safety/cognitive  -MS     Comment, General Information Verbally spoke with Dr. Andrade and in agreement to contact  for dynamic knee extension brace.  -MS     Row Name 04/28/22 1450          Pain Assessment    Pretreatment Pain Rating 4/10  -MS     Posttreatment Pain Rating 4/10  -MS     Pain Location - Side/Orientation Left  -MS     Pain Location lower  -MS     Pain Location - back  -MS     Row Name 04/28/22 1450          Cognition/Psychosocial    Affect/Mental Status (Cognition) WFL  -MS     Orientation Status (Cognition) oriented x 4  -MS     Follows Commands (Cognition) follows two-step commands;repetition of directions required;verbal cues/prompting required  -MS     Personal Safety Interventions fall prevention program maintained;gait belt;nonskid shoes/slippers when out of bed  -MS     Cognitive Function safety deficit  -MS     Row Name 04/28/22 1450          Mobility    Extremity Weight-bearing Status left lower extremity  -MS     Left Lower Extremity (Weight-bearing Status) weight-bearing as tolerated (WBAT)  -MS     Row Name 04/28/22 1450          Bed Mobility    Supine-Sit [place] (Bed Mobility) minimum assist (75% patient effort)  -MS     Sit-Supine [place] (Bed Mobility) minimum assist (75% patient effort)  -MS     Bed Mobility, Safety Issues decreased use of legs for bridging/pushing;impaired trunk control for bed mobility  -MS     Comment, (Bed Mobility) Cues for log roll technique, very rigid with all mobility and reported typically sleeps in a recliner.  -MS     Row Name 04/28/22 1450          Transfer Assessment/Treatment    Comment, (Transfers) KI donned while ambulating with RW.  -MS     Row Name 04/28/22 1450          Transfers    Bed-Chair [place] (Transfers) contact  guard;minimum assist (75% patient effort);verbal cues  -MS     Chair-Bed [place] (Transfers) contact guard;minimum assist (75% patient effort);verbal cues  -MS     Assistive Device (Bed-Chair Transfers) wheelchair  -MS     Sit-Stand [place] (Transfers) minimum assist (75% patient effort);verbal cues;contact guard  -MS     Stand-Sit [place] (Transfers) minimum assist (75% patient effort);nonverbal cues (demo/gesture);verbal cues  -MS     Row Name 04/28/22 1450          Bed-Chair Transfer    Comment, (Bed-Chair Transfer) squat pivot  -MS     Row Name 04/28/22 1450          Chair-Bed Transfer    Assistive Device (Chair-Bed Transfers) wheelchair  -MS     Comment, (Chair-Bed Transfer) squat pivot  -MS     Row Name 04/28/22 1450          Sit-Stand Transfer    Assistive Device (Sit-Stand Transfers) walker, front-wheeled  -MS     Row Name 04/28/22 1450          Stand-Sit Transfer    Assistive Device (Stand-Sit Transfers) walker, front-wheeled  -MS     Comment, (Stand-Sit Transfer) Max VCs and TCs to bring L LE forward prior to sitting when KI is donned.  -MS     Row Name 04/28/22 1450          Gait/Stairs (Locomotion)    [place] Level (Gait) minimum assist (75% patient effort);moderate assist (50% patient effort);2 person assist;verbal cues;nonverbal cues (demo/gesture)  -MS     Assistive Device (Gait) walker, front-wheeled;parallel bars  -MS     Distance in Feet (Gait) // bars: 8' x 4 trials, therapist on stool to assist with L LE; 40' x 3 trials while ambulating with RW  -MS     Pattern (Gait) step-through  -MS     Deviations/Abnormal Patterns (Gait) raul decreased;left sided deviations;stride length decreased  -MS     Bilateral Gait Deviations forward flexed posture  -MS     Left Sided Gait Deviations knee buckling, left side;heel strike decreased;weight shift ability decreased  -MS     Comment, (Gait/Stairs) Fatigues quickly. Use of mirror in front of patient at // bars to assist with posture and  ARISTIDES LI Fearful at times. Fatigue limiting.  -MS     Row Name 04/28/22 1450          Balance    Static Sitting Balance supervision  -MS     Dynamic Sitting Balance supervision  -MS     Position, Sitting Balance sitting in chair  -MS     Sit to Stand Dynamic Balance contact guard  -MS     Static Standing Balance contact guard;minimal assist  -MS     Position/Device Used, Standing Balance supported;parallel bars  -MS     Balance Interventions standing;static;dynamic;moderate challenge;step overs;weight shifting activity;UE activity with balance activity  -MS     Row Name 04/28/22 1450          Motor Skills    Therapeutic Exercise core strength  -MS     Row Name 04/28/22 1450          Hip (Therapeutic Exercise)    Hip Isometrics (Therapeutic Exercise) bilateral;sitting;gluteal sets;10 repetitions;3 sets  -MS     Hip Strengthening (Therapeutic Exercise) sitting;marching while seated;right;10 repetitions;3 sets  -MS     Row Name 04/28/22 1450          Knee (Therapeutic Exercise)    Knee Isometrics (Therapeutic Exercise) bilateral;quad sets;3 second hold;3 sets  -MS     Knee Strengthening (Therapeutic Exercise) sitting;LAQ (long arc quad);right;10 repetitions;3 sets  -MS     Row Name 04/28/22 1450          Core Strength (Therapeutic Exercise)    Core Strength (Therapeutic Exercise) supine;bridging, bilateral lower extremities;10 repetitions;3 sets  -MS     Row Name 04/28/22 1450          Positioning and Restraints    Pre-Treatment Position sitting in chair/recliner  -MS     Post Treatment Position wheelchair  -MS     In Wheelchair sitting;call light within reach;encouraged to call for assist;exit alarm on  -MS     Row Name 04/28/22 1450          Weekly Progress Summary (PT)    Functional Goal Overall Progress (PT) progressing toward functional goals slower than expected  -MS     Weekly Progress Summary (PT) Patient continues to make gradual progress towards goals with PT. Currently, patient requires Emili for bed  mobility, CGA-Alfreda for transfers and ambulating 40' x 3 trials with minAx2. Ambulation has been trialed without AD at // bars as well as ambulating with a RW and KI. Patient continues to demo L knee buckling during gait with endurance limiting primarily. Goals updated and revised as appropriate. Will continue to advance as able.  -MS     Impairments Still Limiting Function (PT) balance impairment;postural control impaired;pain;range of motion deficit;strength deficit  -MS     Row Name 04/28/22 1450          Bed Mobility Goal 2 (PT-IRF)    Progress/Outcomes (Bed Mobility Goal 2, PT-IRF) goal ongoing  -MS     Row Name 04/28/22 1450          Transfer Goal 1 (PT-IRF)    Progress/Outcomes (Transfer Goal 1, PT-IRF) goal met  -MS     Row Name 04/28/22 1450          Transfer Goal 2 (PT-IRF)    Progress/Outcomes (Transfer Goal 2, PT-IRF) goal ongoing  -MS     Row Name 04/28/22 1450          Gait/Walking Locomotion Goal 1 (PT-IRF)    Progress/Outcomes (Gait/Walking Locomotion Goal 1, PT-IRF) goal ongoing  -MS     Row Name 04/28/22 1450          Gait/Walking Locomotion Goal 2 (PT-IRF)    Progress/Outcomes (Gait/Walking Locomotion Goal 2, PT-IRF) goal ongoing  -MS     Row Name 04/28/22 1450          Stairs Goal 1 (PT-IRF)    Progress/Outcomes (Stairs Goal 1, PT-IRF) goal ongoing  -MS           User Key  (r) = Recorded By, (t) = Taken By, (c) = Cosigned By    Initials Name Provider Type    MS AbramsGoldie, PT Physical Therapist              Wound 03/29/22 1858 abdomen Incision (Active)   Dressing Appearance open to air 04/28/22 0900   Closure Liquid skin adhesive 04/28/22 0900   Base dry;closed/resurfaced;scab 04/28/22 0900   Drainage Amount none 04/28/22 0900   Dressing Care open to air 04/28/22 0900       Wound 04/15/22 0352 Left posterior cervical spine Abrasion (Active)   Dressing Appearance open to air 04/27/22 2111   Closure Adhesive bandage 04/28/22 0900   Base pink;moist 04/28/22 0900   Periwound intact 04/28/22  0900   Periwound Temperature warm 04/28/22 0900   Periwound Skin Turgor soft 04/28/22 0900   Edges irregular 04/28/22 0900   Care, Wound other (see comments) 04/27/22 2111       Wound 04/18/22 2048 Bilateral medial gluteal MASD (Moisture associated skin damage) (Active)   Dressing Appearance open to air 04/28/22 0900   Base pink;dry;blanchable 04/28/22 0900   Periwound intact;dry 04/28/22 0900   Periwound Temperature warm 04/28/22 0900   Periwound Skin Turgor soft 04/28/22 0900   Care, Wound cleansed with;soap and water 04/28/22 0900   Dressing Care dressing changed;skin barrier agent applied 04/28/22 0900       Wound 04/18/22 2048 Bilateral groin MASD (Moisture associated skin damage) (Active)   Dressing Appearance open to air 04/28/22 0900   Base pink;dry 04/28/22 0900   Periwound intact 04/28/22 0900   Periwound Temperature warm 04/28/22 0900   Periwound Skin Turgor soft 04/28/22 0900   Care, Wound cleansed with;soap and water 04/28/22 0900   Dressing Care other (see comments) 04/27/22 2111   Periwound Care topical treatment applied 04/28/22 0900       Wound 04/18/22 2048  medial coccyx Skin Tear (Active)   Dressing Appearance dry;intact 04/28/22 0900   Closure Adhesive bandage 04/28/22 0900   Base dry;pink;blanchable 04/28/22 0900   Care, Wound cleansed with;soap and water 04/28/22 0900   Dressing Care dressing changed 04/28/22 0900       Wound 04/20/22 1659 Bilateral medial sacral spine Pressure Injury (Active)   Dressing Appearance dry;intact 04/28/22 0900   Base dry;pink 04/28/22 0900   Periwound intact;dry 04/28/22 0900   Periwound Temperature warm 04/28/22 0900   Periwound Skin Turgor soft 04/28/22 0900   Edges irregular 04/28/22 0900   Drainage Amount none 04/28/22 0900   Dressing Care dressing changed 04/28/22 0900     Physical Therapy Education                 Title: PT OT SLP Therapies (In Progress)     Topic: Physical Therapy (Done)     Point: Mobility training (Done)     Learning Progress Summary            Patient Acceptance, E,TB, NR,VU by MS at 4/27/2022 1119    Acceptance, E,TB, VU,NR by MS at 4/26/2022 1458    Acceptance, E,TB, VU,NR by MS at 4/25/2022 1422    Acceptance, E, NR,VU,DU by  at 4/23/2022 1130    Acceptance, E,TB, VU by MS at 4/21/2022 1102   Significant Other Acceptance, E,TB, NR,VU by MS at 4/27/2022 1119    Acceptance, E,TB, VU by MS at 4/21/2022 1102                   Point: Home exercise program (Done)     Learning Progress Summary           Patient Acceptance, E,TB, NR,VU by MS at 4/27/2022 1119    Acceptance, E,TB, VU,NR by MS at 4/26/2022 1458    Acceptance, E,TB, VU,NR by MS at 4/25/2022 1422    Acceptance, E, NR,VU,DU by  at 4/23/2022 1130    Acceptance, E,TB, VU by MS at 4/21/2022 1102   Significant Other Acceptance, E,TB, NR,VU by MS at 4/27/2022 1119    Acceptance, E,TB, VU by MS at 4/21/2022 1102                   Point: Body mechanics (Done)     Learning Progress Summary           Patient Acceptance, E,TB, NR,VU by MS at 4/27/2022 1119    Acceptance, E,TB, VU,NR by MS at 4/26/2022 1458    Acceptance, E,TB, VU,NR by MS at 4/25/2022 1422    Acceptance, E, NR,VU,DU by  at 4/23/2022 1130    Acceptance, E,TB, VU by MS at 4/21/2022 1102   Significant Other Acceptance, E,TB, NR,VU by MS at 4/27/2022 1119    Acceptance, E,TB, VU by MS at 4/21/2022 1102                   Point: Precautions (Done)     Learning Progress Summary           Patient Acceptance, E,TB, NR,VU by MS at 4/27/2022 1119    Acceptance, E,TB, VU,NR by MS at 4/26/2022 1458    Acceptance, E,TB, VU,NR by MS at 4/25/2022 1422    Acceptance, E, NR,VU,DU by  at 4/23/2022 1130    Acceptance, E,TB, VU by MS at 4/21/2022 1102   Significant Other Acceptance, E,TB, NR,VU by MS at 4/27/2022 1119    Acceptance, E,TB, VU by MS at 4/21/2022 1102                               User Key     Initials Effective Dates Name Provider Type Discipline     06/16/21 -  Sandi Shepard, PT Physical Therapist PT    MS 06/16/21 -  Summers,  Goldie MCKOY, PT Physical Therapist PT                PT Recommendation and Plan    Planned Therapy Interventions (PT): bed mobility training, balance training, gait training, home exercise program, postural re-education, patient/family education, ROM (range of motion), strengthening, transfer training  Frequency of Treatment (PT): 90 minutes per session  Anticipated Equipment Needs at Discharge (PT Eval): wheelchair (pending patient progress made, LRAD for R LE)                  Time Calculation:         Therapy Charges for Today     Code Description Service Date Service Provider Modifiers Qty    [id number]  PT THER PROC EA 15 MIN 4/27/2022 Goldie Abrams, PT GP 4    [id number]  PT THERAPEUTIC ACT EA 15 MIN 4/27/2022 Goldie Abrams, PT GP 2    [id number]  GAIT TRAINING EA 15 MIN 4/28/2022 Goldie Abrams, PT GP 2    [id number]  PT THERAPEUTIC ACT EA 15 MIN 4/28/2022 Goldie Abrams, PT GP 2    [id number]  PT THER PROC EA 15 MIN 4/28/2022 Goldie Abrams, PT GP 2    [id number]  PT THER SUPP EA 15 MIN 4/28/2022 Goldie Abrams, PT GP 3                   Goldie Abrams, PT  4/28/2022

## 2022-04-29 LAB
ANION GAP SERPL CALCULATED.3IONS-SCNC: 8.6 MMOL/L (ref 5–15)
BUN SERPL-MCNC: 24 MG/DL (ref 8–23)
BUN/CREAT SERPL: 20.3 (ref 7–25)
CALCIUM SPEC-SCNC: 8.9 MG/DL (ref 8.6–10.5)
CHLORIDE SERPL-SCNC: 104 MMOL/L (ref 98–107)
CO2 SERPL-SCNC: 26.4 MMOL/L (ref 22–29)
CREAT SERPL-MCNC: 1.18 MG/DL (ref 0.76–1.27)
DEPRECATED RDW RBC AUTO: 46.6 FL (ref 37–54)
EGFRCR SERPLBLD CKD-EPI 2021: 60.8 ML/MIN/1.73
ERYTHROCYTE [DISTWIDTH] IN BLOOD BY AUTOMATED COUNT: 15.3 % (ref 12.3–15.4)
GLUCOSE BLDC GLUCOMTR-MCNC: 115 MG/DL (ref 70–130)
GLUCOSE BLDC GLUCOMTR-MCNC: 175 MG/DL (ref 70–130)
GLUCOSE BLDC GLUCOMTR-MCNC: 178 MG/DL (ref 70–130)
GLUCOSE BLDC GLUCOMTR-MCNC: 207 MG/DL (ref 70–130)
GLUCOSE SERPL-MCNC: 145 MG/DL (ref 65–99)
HCT VFR BLD AUTO: 29.9 % (ref 37.5–51)
HGB BLD-MCNC: 9.4 G/DL (ref 13–17.7)
INR PPP: 3.93 (ref 0.9–1.1)
MCH RBC QN AUTO: 26.3 PG (ref 26.6–33)
MCHC RBC AUTO-ENTMCNC: 31.4 G/DL (ref 31.5–35.7)
MCV RBC AUTO: 83.5 FL (ref 79–97)
PLATELET # BLD AUTO: 210 10*3/MM3 (ref 140–450)
PMV BLD AUTO: 11.4 FL (ref 6–12)
POTASSIUM SERPL-SCNC: 4.4 MMOL/L (ref 3.5–5.2)
PROTHROMBIN TIME: 38.8 SECONDS (ref 11.7–14.2)
RBC # BLD AUTO: 3.58 10*6/MM3 (ref 4.14–5.8)
SODIUM SERPL-SCNC: 139 MMOL/L (ref 136–145)
WBC NRBC COR # BLD: 3.61 10*3/MM3 (ref 3.4–10.8)

## 2022-04-29 PROCEDURE — 85610 PROTHROMBIN TIME: CPT | Performed by: PHYSICAL MEDICINE & REHABILITATION

## 2022-04-29 PROCEDURE — 97530 THERAPEUTIC ACTIVITIES: CPT

## 2022-04-29 PROCEDURE — 97535 SELF CARE MNGMENT TRAINING: CPT

## 2022-04-29 PROCEDURE — 97110 THERAPEUTIC EXERCISES: CPT

## 2022-04-29 PROCEDURE — 82962 GLUCOSE BLOOD TEST: CPT

## 2022-04-29 PROCEDURE — 85027 COMPLETE CBC AUTOMATED: CPT | Performed by: PHYSICAL MEDICINE & REHABILITATION

## 2022-04-29 PROCEDURE — 97116 GAIT TRAINING THERAPY: CPT

## 2022-04-29 PROCEDURE — 80048 BASIC METABOLIC PNL TOTAL CA: CPT | Performed by: PHYSICAL MEDICINE & REHABILITATION

## 2022-04-29 RX ADMIN — MAGNESIUM OXIDE 400 MG (241.3 MG MAGNESIUM) TABLET 400 MG: TABLET at 23:12

## 2022-04-29 RX ADMIN — BETAMETHASONE DIPROPIONATE 1 APPLICATION: 0.5 OINTMENT TOPICAL at 08:19

## 2022-04-29 RX ADMIN — METOPROLOL SUCCINATE 12.5 MG: 25 TABLET, EXTENDED RELEASE ORAL at 08:19

## 2022-04-29 RX ADMIN — DIGOXIN 62.5 MCG: 125 TABLET ORAL at 12:03

## 2022-04-29 RX ADMIN — NYSTATIN 1 APPLICATION: 100000 POWDER TOPICAL at 08:20

## 2022-04-29 RX ADMIN — DAKIN'S SOLUTION 0.125% (QUARTER STRENGTH) 946 ML: 0.12 SOLUTION at 13:54

## 2022-04-29 RX ADMIN — BETAMETHASONE DIPROPIONATE 1 APPLICATION: 0.5 OINTMENT TOPICAL at 14:18

## 2022-04-29 RX ADMIN — Medication 1 CAPSULE: at 08:18

## 2022-04-29 RX ADMIN — BETAMETHASONE DIPROPIONATE 1 APPLICATION: 0.5 OINTMENT TOPICAL at 23:13

## 2022-04-29 RX ADMIN — ATORVASTATIN CALCIUM 40 MG: 20 TABLET, FILM COATED ORAL at 08:18

## 2022-04-29 RX ADMIN — FAMOTIDINE 20 MG: 20 TABLET ORAL at 08:18

## 2022-04-29 RX ADMIN — DAKIN'S SOLUTION 0.125% (QUARTER STRENGTH) 946 ML: 0.12 SOLUTION at 23:13

## 2022-04-29 RX ADMIN — NYSTATIN: 100000 POWDER TOPICAL at 23:17

## 2022-04-29 RX ADMIN — MAGNESIUM OXIDE 400 MG (241.3 MG MAGNESIUM) TABLET 400 MG: TABLET at 08:18

## 2022-04-29 NOTE — PLAN OF CARE
Goal Outcome Evaluation:     Slept well. Meds with applesauce. Incontinent & continent of B&B.  No complaints. Glucose 139, refused  Lantus tonight, afraid glucose would be to low in am. Dakin compress applied 20mins. Then applied Diprolene ointment to upper back sore/wound.

## 2022-04-29 NOTE — THERAPY TREATMENT NOTE
Inpatient Rehabilitation - Physical Therapy Treatment Note       [facility]     Patient Name: Francisco Sequeira  : 1935  MRN: [id number]    Today's Date: 2022                    Admit Date: 2022      Visit Dx:     ICD-10-CM ICD-9-CM   1. 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)   • Iron deficiency anemia   • Chronic anticoagulation   • Hemiparesis of left nondominant side as late effect of cerebral infarction (HCC)   • Rectus sheath hematoma   • Sepsis (HCC)   • Calculus of gallbladder with acute cholecystitis without obstruction   • History of Clostridium difficile infection   • Aspiration pneumonitis (HCC)   • Hematoma of iliopsoas muscle, left, initial encounter   • History of CVA (cerebrovascular accident)   • S/P laparoscopic cholecystectomy   • Anemia   • Hematoma of left iliopsoas muscle       Past Medical History:   Diagnosis Date   • Allergic rhinitis    • Aortic valve insufficiency    • Ascending aortic aneurysm (HCC)    • Atrial fibrillation (HCC)    • Bacteremia    • Calcific aortic stenosis of bicuspid valve    • Cardiac arrest (HCC)    • Cardiomyopathy (HCC)    • CKD (chronic kidney disease) stage 3, GFR 30-59 ml/min (HCC)    • Contact dermatitis due to poison ivy    • Elbow fracture    • Esophageal reflux    • GERD (gastroesophageal reflux disease)    • Head injury    • History of transfusion    • Hyperglycemia    • Hyperlipidemia    • Hypertension    • Kidney carcinoma (HCC)    •  Nonischemic cardiomyopathy (HCC)    • Renal oncocytoma    • Seasonal allergic reaction    • Sinusitis    • Syncope    • Type 2 diabetes mellitus (HCC)     uncontrolled   • Visual field defect        Past Surgical History:   Procedure Laterality Date   • AORTIC VALVE REPAIR/REPLACEMENT     • ASCENDING AORTIC ANEURYSM REPAIR W/ MECHANICAL AORTIC VALVE REPLACEMENT     • CHOLECYSTECTOMY WITH INTRAOPERATIVE CHOLANGIOGRAM N/A 3/29/2022    Procedure: Laparoscopic cholecystectomy with cholangiogram, possible open;  Surgeon: Lisa Guidry MD;  Location: [facility] MAIN OR;  Service: General;  Laterality: N/A;   • COLONOSCOPY N/A 10/28/2021    Procedure: COLONOSCOPY to cecum:  cold snare polyps,;  Surgeon: Dinesh Meza MD;  Location: [facility] ENDOSCOPY;  Service: Gastroenterology;  Laterality: N/A;  pre:  Iron deficiency anemia  post:  polyps, diverticulosis,    • COLONOSCOPY N/A 11/9/2021    Procedure: COLONOSCOPY to cecum with APC cautery of AVM and clip placement x1;  Surgeon: Pavan Rodriguez MD;  Location: [facility] ENDOSCOPY;  Service: Gastroenterology;  Laterality: N/A;  PRE - gi bleed, anemia  POST - diverticulosis, poor prep, AVM right colon   • ENDOSCOPY N/A 10/28/2021    Procedure: ESOPHAGOGASTRODUODENOSCOPY with biopsies;  Surgeon: Dinesh Meza MD;  Location: [facility] ENDOSCOPY;  Service: Gastroenterology;  Laterality: N/A;  pre:  Iron deficiency anemia  post:  duodenitis and gastritis   • EYE SURGERY  12/09/2020    cataract surgery    • NEPHRECTOMY     • OTHER SURGICAL HISTORY      elbow surgery   • OTHER SURGICAL HISTORY      right arm surgery   • PROSTATE SURGERY     • THORACENTESIS Left     diagnostic       PT ASSESSMENT (last 12 hours)     IRF PT Evaluation and Treatment     Row Name 04/29/22 1226          PT Time and Intention    Document Type daily treatment  -MS     Mode of Treatment physical therapy  -MS     Patient/Family/Caregiver Comments/Observations AM: sitting up in wc, NAD, exit  alarm on; PM: in therapy gym and just completed OT  -MS     Total Minutes, Physical Therapy 90  -MS     Row Name 04/29/22 1226          General Information    Existing Precautions/Restrictions fall  L knee buckling  -MS     Limitations/Impairments safety/cognitive  -MS     Row Name 04/29/22 1226          Pain Assessment    Pretreatment Pain Rating 0/10 - no pain  -MS     Posttreatment Pain Rating 0/10 - no pain  -MS     Row Name 04/29/22 1226          Cognition/Psychosocial    Affect/Mental Status (Cognition) WFL  -MS     Orientation Status (Cognition) oriented x 4  -MS     Follows Commands (Cognition) follows two-step commands;repetition of directions required;verbal cues/prompting required  -MS     Personal Safety Interventions fall prevention program maintained;gait belt;nonskid shoes/slippers when out of bed  -MS     Cognitive Function safety deficit  -MS     Safety Deficit (Cognition) awareness of need for assistance;at risk behavior observed;insight into deficits/self-awareness  -MS     Row Name 04/29/22 1226          Mobility    Extremity Weight-bearing Status left lower extremity  -MS     Left Lower Extremity (Weight-bearing Status) weight-bearing as tolerated (WBAT)  -MS     Row Name 04/29/22 1226          Transfers    Sit-Stand Jennings (Transfers) contact guard;minimum assist (75% patient effort);verbal cues  -MS     Stand-Sit Jennings (Transfers) contact guard;minimum assist (75% patient effort);verbal cues  -MS     Row Name 04/29/22 1226          Sit-Stand Transfer    Assistive Device (Sit-Stand Transfers) walker, front-wheeled  -MS     Row Name 04/29/22 1226          Stand-Sit Transfer    Assistive Device (Stand-Sit Transfers) walker, front-wheeled  -MS     Comment, (Stand-Sit Transfer) Cues to bring L LE forward while wearing KI prior to sitting- improvement noted today  -MS     Row Name 04/29/22 1226          Gait/Stairs (Locomotion)    Jennings Level (Gait) minimum assist (75% patient  effort);2 person assist;verbal cues;nonverbal cues (demo/gesture)  -MS     Assistive Device (Gait) walker, front-wheeled  -MS     Distance in Feet (Gait) 40' x 3 trials w RW, at // bars: 8' x 3 trials  -MS     Pattern (Gait) step-through  -MS     Deviations/Abnormal Patterns (Gait) raul decreased;left sided deviations;stride length decreased  -MS     Bilateral Gait Deviations forward flexed posture  -MS     Left Sided Gait Deviations knee buckling, left side;heel strike decreased;weight shift ability decreased  -MS     Comment, (Gait/Stairs) KI donned when using RW only- cues for knee extension; max sequencing cues at // bars and to widen KACY  -MS     Row Name 04/29/22 1226          Balance    Static Sitting Balance supervision  -MS     Dynamic Sitting Balance supervision  -MS     Position, Sitting Balance sitting in chair  -MS     Sit to Stand Dynamic Balance contact guard  -MS     Static Standing Balance contact guard;minimal assist  -MS     Dynamic Standing Balance minimal assist;moderate assist  -MS     Position/Device Used, Standing Balance supported;parallel bars  -MS     Balance Interventions standing;static;dynamic;highly challenging;moderate challenge;weight shifting activity;combined head and eye movements;manual resistance applied during activity  -MS     Row Name 04/29/22 1226          Hip (Therapeutic Exercise)    Hip AAROM (Therapeutic Exercise) sitting;left;flexion;10 repetitions;3 sets  -MS     Hip Strengthening (Therapeutic Exercise) sitting;right;marching while seated;10 repetitions;3 sets;standing;extension  standing hip ext at // bars 3 sets x 10 reps B  -MS     Row Name 04/29/22 1226          Knee (Therapeutic Exercise)    Knee AAROM (Therapeutic Exercise) sitting;left;extension;10 repetitions;3 sets  -MS     Knee Strengthening (Therapeutic Exercise) sitting;right;LAQ (long arc quad);10 repetitions;3 sets  -MS     Row Name 04/29/22 1226          Positioning and Restraints    Pre-Treatment  Position sitting in chair/recliner  -MS     Post Treatment Position wheelchair  -MS     In Wheelchair sitting;call light within reach;encouraged to call for assist;exit alarm on;with family/caregiver  -MS           User Key  (r) = Recorded By, (t) = Taken By, (c) = Cosigned By    Initials Name Provider Type    Goldie Lazar, PT Physical Therapist              Wound 03/29/22 1858 abdomen Incision (Active)   Dressing Appearance open to air 04/29/22 0804   Closure Liquid skin adhesive 04/28/22 2136   Base dry;scab 04/28/22 2136   Drainage Amount none 04/28/22 2136   Dressing Care open to air 04/29/22 0804       Wound 04/15/22 0352 Left posterior cervical spine Abrasion (Active)   Dressing Appearance dry;intact 04/29/22 0804   Closure Open to air 04/29/22 0804   Base moist;pink;red 04/29/22 0804   Periwound intact 04/29/22 0804   Periwound Temperature warm 04/29/22 0804   Periwound Skin Turgor soft 04/29/22 0804   Care, Wound other (see comments) 04/29/22 0804   Dressing Care foam 04/29/22 0804       Wound 04/18/22 2048 Bilateral medial gluteal MASD (Moisture associated skin damage) (Active)   Dressing Appearance open to air 04/29/22 0804   Base blanchable;pink;red 04/29/22 0804   Periwound intact;dry 04/29/22 0804   Periwound Temperature warm 04/29/22 0804   Periwound Skin Turgor soft 04/29/22 0804   Care, Wound cleansed with;soap and water 04/29/22 0804   Dressing Care dressing applied;dressing changed;skin barrier agent applied 04/29/22 0804       Wound 04/18/22 2048 Bilateral groin MASD (Moisture associated skin damage) (Active)   Dressing Appearance open to air 04/29/22 0804   Base pink;red 04/29/22 0804   Periwound intact 04/29/22 0804   Periwound Temperature warm 04/29/22 0804   Periwound Skin Turgor soft 04/29/22 0804   Care, Wound cleansed with;soap and water 04/29/22 0804   Dressing Care other (see comments) 04/29/22 0804   Periwound Care topical treatment applied 04/29/22 0804       Wound 04/20/22  1659 Bilateral medial sacral spine Pressure Injury (Active)   Dressing Appearance dry;intact 04/29/22 0804   Base dry;pink 04/29/22 0804   Periwound intact;dry 04/29/22 0804   Periwound Temperature warm 04/29/22 0804   Drainage Amount none 04/29/22 0804   Dressing Care dressing applied;dressing changed 04/29/22 0804   Periwound Care barrier film applied 04/29/22 0804     Physical Therapy Education                 Title: PT OT SLP Therapies (In Progress)     Topic: Physical Therapy (Done)     Point: Mobility training (Done)     Learning Progress Summary           Patient Acceptance, E,TB, NR,VU by MS at 4/27/2022 1119    Acceptance, E,TB, VU,NR by MS at 4/26/2022 1458    Acceptance, E,TB, VU,NR by MS at 4/25/2022 1422    Acceptance, E, NR,VU,DU by  at 4/23/2022 1130    Acceptance, E,TB, VU by MS at 4/21/2022 1102   Significant Other Acceptance, E,TB, NR,VU by MS at 4/27/2022 1119    Acceptance, E,TB, VU by MS at 4/21/2022 1102                   Point: Home exercise program (Done)     Learning Progress Summary           Patient Acceptance, E,TB, NR,VU by MS at 4/27/2022 1119    Acceptance, E,TB, VU,NR by MS at 4/26/2022 1458    Acceptance, E,TB, VU,NR by MS at 4/25/2022 1422    Acceptance, E, NR,VU,DU by  at 4/23/2022 1130    Acceptance, E,TB, VU by MS at 4/21/2022 1102   Significant Other Acceptance, E,TB, NR,VU by MS at 4/27/2022 1119    Acceptance, E,TB, VU by MS at 4/21/2022 1102                   Point: Body mechanics (Done)     Learning Progress Summary           Patient Acceptance, E,TB, NR,VU by MS at 4/27/2022 1119    Acceptance, E,TB, VU,NR by MS at 4/26/2022 1458    Acceptance, E,TB, VU,NR by MS at 4/25/2022 1422    Acceptance, E, NR,VU,DU by  at 4/23/2022 1130    Acceptance, E,TB, VU by MS at 4/21/2022 1102   Significant Other Acceptance, E,TB, NR,VU by MS at 4/27/2022 1119    Acceptance, E,TB, VU by MS at 4/21/2022 1102                   Point: Precautions (Done)     Learning Progress Summary            Patient Acceptance, E,TB, NR,VU by MS at 4/27/2022 1119    Acceptance, E,TB, VU,NR by MS at 4/26/2022 1458    Acceptance, E,TB, VU,NR by MS at 4/25/2022 1422    Acceptance, E, NR,VU,DU by LH at 4/23/2022 1130    Acceptance, E,TB, VU by MS at 4/21/2022 1102   Significant Other Acceptance, E,TB, NR,VU by MS at 4/27/2022 1119    Acceptance, E,TB, VU by MS at 4/21/2022 1102                               User Key     Initials Effective Dates Name Provider Type [facility] 06/16/21 -  Sandi Shepard, PT Physical Therapist PT    MS 06/16/21 -  Goldie Abrams PT Physical Therapist PT                PT Recommendation and Plan    Planned Therapy Interventions (PT): bed mobility training, balance training, gait training, home exercise program, postural re-education, patient/family education, ROM (range of motion), strengthening, transfer training  Frequency of Treatment (PT): 90 minutes per session  Anticipated Equipment Needs at Discharge (PT Eval): wheelchair (pending patient progress made, LRAD for R LE)                  Time Calculation:      PT Charges     Row Name 04/29/22 1319 04/29/22 1110          Time Calculation    Start Time 1300  -MS 1030  -MS     Stop Time 1330  -MS 1130  -MS     Time Calculation (min) 30 min  -MS 60 min  -MS     PT Received On -- 04/29/22  -MS     PT - Next Appointment -- 04/30/22  -MS           User Key  (r) = Recorded By, (t) = Taken By, (c) = Cosigned By    Initials Name Provider Type    MS AbramsGoldie, PT Physical Therapist                Therapy Charges for Today     Code Description Service Date Service Provider Modifiers Qty    32665236913 HC GAIT TRAINING EA 15 MIN 4/28/2022 Goldie Abrams, PT GP 2    17528612000 HC PT THERAPEUTIC ACT EA 15 MIN 4/28/2022 Goldie Abrams, PT GP 2    19766711264 HC PT THER PROC EA 15 MIN 4/28/2022 Goldie Abrams, PT GP 2    41718169323 HC PT THER SUPP EA 15 MIN 4/28/2022 Goldie Abrams, PT GP 3    [id number]  PT THER  PROC EA 15 MIN 4/29/2022 Goldie Abrams, PT GP 3    [id number]  PT THERAPEUTIC ACT EA 15 MIN 4/29/2022 AbramsGoldie, PT GP 1    [id number]  GAIT TRAINING EA 15 MIN 4/29/2022 Goldie Abrams, PT GP 2                   Goldie Abrams, PT  4/29/2022

## 2022-04-29 NOTE — PROGRESS NOTES
[facility] Clinical Pharmacy Services: Warfarin Dosing/Monitoring Consult    Francisco Sequeira is a 86 y.o. male, estimated creatinine clearance is 39.7 mL/min (by C-G formula based on SCr of 1.18 mg/dL). weighing 60.2 kg (132 lb 11.5 oz).    Results from last 7 days   Lab Units 04/29/22  0742 04/27/22  1041 04/26/22  0749 04/25/22  1037 04/24/22  0433 04/23/22  0758   INR  3.93* 3.49* 3.46* 3.23* 3.27*  --    HEMOGLOBIN g/dL 9.4*  --  9.6* 10.3* 9.2* 9.9*   HEMATOCRIT % 29.9*  --  30.7* 33.6* 30.8* 31.8*   PLATELETS 10*3/mm3 210  --  209 265 208 196     Prior to admission anticoagulation: Per [facility] clinic visit note - pt takes warfarin 7.5 mg every Mon, Wed, Fri; 5 mg all other days    Hospital Anticoagulation:  Consulting provider: Dr. Andrade  Start date: 4/20/22  Indication: Mechanical valve  Target INR:  3-3.5  Expected duration: tbd   Bridge Therapy: no. Lovenox stopped on 4/21.    Potential food or drug interactions: none currently    Education complete?/Date: Home medication    Assessment/Plan:  INR supra-therapeutic at 3.93 today. Will hold warfarin today. Resume daily INR check. Will have to adjust home regimen (home weekly dose = 42.5 mg) after INR within goal again.  Monitor for any signs or symptoms of bleeding  INR daily. Will follow up tomorrow    Pharmacy will continue to follow until discharge or discontinuation of warfarin.     Brian Jarquin [facility]  Clinical Pharmacist

## 2022-04-29 NOTE — PLAN OF CARE
Goal Outcome Evaluation:  Plan of Care Reviewed With: patient            Pt is alert and oriented. [place]. Pleasant. Follows all commands. LLE weakness. Large oval patch with an erythematous base between shoulder blades. Dermatology saw pt. Continue with current treatment.  Large BM.  Pt denies shortness of breath, cough and chest pain. Wife at bedside.

## 2022-04-29 NOTE — PLAN OF CARE
Goal Outcome Evaluation:  Plan of Care Reviewed With: patient        Progress: improving  Outcome Evaluation: AAOx4. Cooperative with all care. Up with assist of 2. No safety concerns noted today. Incontinent of B&B most times, urinal at times. Mepilex to coccyx and abrasion on right upper back. Dressing care as ordered. Turned q 2 while in bed. No c/o pain. VS stable.

## 2022-04-29 NOTE — THERAPY TREATMENT NOTE
Inpatient Rehabilitation - Occupational Therapy Treatment Note    [facility]     Patient Name: Francisco Sequeira  : 1935  MRN: [id number]    Today's Date: 2022                 Admit Date: 2022         ICD-10-CM ICD-9-CM   1. 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)   • Iron deficiency anemia   • Chronic anticoagulation   • Hemiparesis of left nondominant side as late effect of cerebral infarction (HCC)   • Rectus sheath hematoma   • Sepsis (HCC)   • Calculus of gallbladder with acute cholecystitis without obstruction   • History of Clostridium difficile infection   • Aspiration pneumonitis (HCC)   • Hematoma of iliopsoas muscle, left, initial encounter   • History of CVA (cerebrovascular accident)   • S/P laparoscopic cholecystectomy   • Anemia   • Hematoma of left iliopsoas muscle       Past Medical History:   Diagnosis Date   • Allergic rhinitis    • Aortic valve insufficiency    • Ascending aortic aneurysm (HCC)    • Atrial fibrillation (HCC)    • Bacteremia    • Calcific aortic stenosis of bicuspid valve    • Cardiac arrest (HCC)    • Cardiomyopathy (HCC)    • CKD (chronic kidney disease) stage 3, GFR 30-59 ml/min (HCC)    • Contact dermatitis due to poison ivy    • Elbow fracture    • Esophageal reflux    • GERD (gastroesophageal reflux disease)    • Head injury    • History of transfusion    • Hyperglycemia    • Hyperlipidemia    • Hypertension    • Kidney carcinoma (HCC)    • Nonischemic  cardiomyopathy (HCC)    • Renal oncocytoma    • Seasonal allergic reaction    • Sinusitis    • Syncope    • Type 2 diabetes mellitus (HCC)     uncontrolled   • Visual field defect        Past Surgical History:   Procedure Laterality Date   • AORTIC VALVE REPAIR/REPLACEMENT     • ASCENDING AORTIC ANEURYSM REPAIR W/ MECHANICAL AORTIC VALVE REPLACEMENT     • CHOLECYSTECTOMY WITH INTRAOPERATIVE CHOLANGIOGRAM N/A 3/29/2022    Procedure: Laparoscopic cholecystectomy with cholangiogram, possible open;  Surgeon: Lisa Guidry MD;  Location: [facility] MAIN OR;  Service: General;  Laterality: N/A;   • COLONOSCOPY N/A 10/28/2021    Procedure: COLONOSCOPY to cecum:  cold snare polyps,;  Surgeon: Dinesh Meza MD;  Location: [facility] ENDOSCOPY;  Service: Gastroenterology;  Laterality: N/A;  pre:  Iron deficiency anemia  post:  polyps, diverticulosis,    • COLONOSCOPY N/A 11/9/2021    Procedure: COLONOSCOPY to cecum with APC cautery of AVM and clip placement x1;  Surgeon: Pavan Rodriguez MD;  Location: [facility] ENDOSCOPY;  Service: Gastroenterology;  Laterality: N/A;  PRE - gi bleed, anemia  POST - diverticulosis, poor prep, AVM right colon   • ENDOSCOPY N/A 10/28/2021    Procedure: ESOPHAGOGASTRODUODENOSCOPY with biopsies;  Surgeon: Dinesh Meza MD;  Location: [facility] ENDOSCOPY;  Service: Gastroenterology;  Laterality: N/A;  pre:  Iron deficiency anemia  post:  duodenitis and gastritis   • EYE SURGERY  12/09/2020    cataract surgery    • NEPHRECTOMY     • OTHER SURGICAL HISTORY      elbow surgery   • OTHER SURGICAL HISTORY      right arm surgery   • PROSTATE SURGERY     • THORACENTESIS Left     diagnostic             IRF OT ASSESSMENT FLOWSHEET (last 12 hours)     IRF OT Evaluation and Treatment     Row Name 04/29/22 1447 04/29/22 1204       OT Time and Intention    Document Type daily treatment  -DN progress note  -DN    Mode of Treatment occupational therapy  -DN occupational therapy  -DN    Patient Effort  good  -DN good  -DN    Row Name 04/29/22 1204          Pain Assessment    Pretreatment Pain Rating 0/10 - no pain  -DN     Posttreatment Pain Rating 0/10 - no pain  -DN     Row Name 04/29/22 1204          Cognition/Psychosocial    Affect/Mental Status (Cognition) WFL  -DN     Row Name 04/29/22 1204          Bathing    Comment (Bathing) pt adls completed prior to OT session wife completed in bed  -DN     Row Name 04/29/22 1204          Toileting    [place] Level (Toileting) toileting skills;adjust/manage clothing;perform perineal hygiene;maximum assist (25% patient effort)  -DN     Assistive Device Use (Toileting) grab bar/safety frame;raised toilet seat  -DN     Position (Toileting) supported standing;supported sitting  -DN     Set-up Assistance (Toileting) change pad/brief  -DN     Comment (Toileting) inc of bowel needed assist with clean up and brief change  -DN     Row Name 04/29/22 1204          Transfers    [place] Level (Toilet Transfer) moderate assist (50% patient effort);verbal cues;nonverbal cues (demo/gesture)  -DN     Assistive Device (Toilet Transfer) wheelchair  -DN     Row Name 04/29/22 1204          Toilet Transfer    Type (Toilet Transfer) stand pivot/stand step  -DN     Row Name 04/29/22 1447 04/29/22 1204       Shoulder (Therapeutic Exercise)    Shoulder (Therapeutic Exercise) strengthening exercise  -DN strengthening exercise  -DN    Shoulder AROM (Therapeutic Exercise) bilateral;10 repetitions;3 sets  -DN bilateral;10 repetitions;3 sets  -DN    Shoulder Strengthening (Therapeutic Exercise) -- 2 lb free weight  3 minutes on arm bike seated  -DN    Shoulder Pulley Exercises (Therapeutic Exercise) --  10 punds on rickshaw, and 2 pounds on pulleys  -DN --  dowel, yellow theraband  -DN    Row Name 04/29/22 1204          Elbow/Forearm (Therapeutic Exercise)    Elbow/Forearm (Therapeutic Exercise) strengthening exercise  -DN     Elbow/Forearm AROM (Therapeutic Exercise) bilateral;10  repetitions;3 sets  -DN     Elbow/Forearm Strengthening (Therapeutic Exercise) 2 lb free weight  -DN     Row Name 04/29/22 1204          Wrist (Therapeutic Exercise)    Wrist (Therapeutic Exercise) strengthening exercise  -DN     Wrist AROM (Therapeutic Exercise) bilateral;10 repetitions;3 sets  -DN     Wrist Strengthening (Therapeutic Exercise) 2 lb free weight  -DN     Row Name 04/29/22 1204          Hand (Therapeutic Exercise)    Hand (Therapeutic Exercise) strengthening exercise  -DN     Hand Strengthening (Therapeutic Exercise) bilateral;hand gripper  -DN     Row Name 04/29/22 1447          Balance    Static Standing Balance contact guard;verbal cues;non-verbal cues (demo/gesture)  pt stood for BUE activity for 1 mnute 30 seconds x 2 with some lle leg buckle  -DN     Row Name 04/29/22 1447          Positioning and Restraints    Pre-Treatment Position sitting in chair/recliner  -DN     Post Treatment Position wheelchair  -DN     In Bed sitting;call light within reach;encouraged to call for assist;exit alarm on;with family/caregiver  -DN     Row Name 04/29/22 1204          Bathing Goal 1 (OT-IRF)    Activity/Device (Bathing Goal 1, OT-IRF) bathing skills, all;grab bar, tub/shower  -DN     [place] Level (Bathing Goal 1, OT-IRF) contact guard required  -DN     Time Frame (Bathing Goal 1, OT-IRF) short-term goal (STG)  -DN     Progress/Outcomes (Bathing Goal 1, OT-IRF) goal met;goal revised this date  -DN     Row Name 04/29/22 1204          Bathing Goal 2 (OT-IRF)    Activity/Device (Bathing Goal 2, OT-IRF) bathing skills, all  -DN     [place] Level (Bathing Goal 2, OT-IRF) contact guard required  -DN     Time Frame (Bathing Goal 2, OT-IRF) long-term goal (LTG)  -DN     Progress/Outcomes (Bathing Goal 2, OT-IRF) goal ongoing  -DN     Row Name 04/29/22 1204          UB Dressing Goal 1 (OT-IRF)    Activity/Device (UB Dressing Goal 1, OT-IRF) upper body dressing  -DN     [place] (UB Dress Goal 1, OT-IRF)  set-up required  -DN     Time Frame (UB Dressing Goal 1, OT-IRF) short-term goal (STG)  -DN     Progress/Outcomes (UB Dressing Goal 1, OT-IRF) goal met  -DN     Row Name 04/29/22 1204          UB Dressing Goal 2 (OT-IRF)    Activity/Device (UB Dressing Goal 2, OT-IRF) upper body dressing  -DN     [place] (UB Dress Goal 2, OT-IRF) set-up required  -DN     Time Frame (UB Dressing Goal 2, OT-IRF) long-term goal (LTG)  -DN     Progress/Outcomes (UB Dressing Goal 2, OT-IRF) goal met  -DN     Row Name 04/29/22 1204          LB Dressing Goal 1 (OT-IRF)    Activity/Device (LB Dressing Goal 1, OT-IRF) lower body dressing  -DN     [place] (LB Dressing Goal 1, OT-IRF) moderate assist (50-74% patient effort)  -DN     Time Frame (LB Dressing Goal 1, OT-IRF) short-term goal (STG)  -DN     Progress/Outcomes (LB Dressing Goal 1, OT-IRF) goal met;goal revised this date  -DN     Row Name 04/29/22 1204          LB Dressing Goal 2 (OT-IRF)    Activity/Device (LB Dressing Goal 2, OT-IRF) lower body dressing  -DN     [place] (LB Dressing Goal 2, OT-IRF) minimum assist (75% or more patient effort)  -DN     Time Frame (LB Dressing Goal 2, OT-IRF) long-term goal (LTG)  -DN     Progress/Outcomes (LB Dressing Goal 2, OT-IRF) goal ongoing  -DN     Row Name 04/29/22 1204          Grooming Goal 1 (OT-IRF)    Activity/Device (Grooming Goal 1, OT-IRF) grooming skills, all  -DN     [place] (Grooming Goal 1, OT-IRF) set-up required  -DN     Time Frame (Grooming Goal 1, OT-IRF) short-term goal (STG)  -DN     Progress/Outcomes (Grooming Goal 1, OT-IRF) goal met  -DN     Row Name 04/29/22 1204          Grooming Goal 2 (OT-IRF)    Activity/Device (Grooming Goal 2, OT-IRF) grooming skills, all  -DN     [place] (Grooming Goal 2, OT-IRF) set-up required  -DN     Time Frame (Grooming Goal 2, OT-IRF) long-term goal (LTG)  -DN     Progress/Outcomes (Grooming Goal 2, OT-IRF) goal met  -DN     Row Name 04/29/22 1204          Toileting  Goal 1 (OT-IRF)    Activity/Device (Toileting Goal 1, OT-IRF) toileting skills, all  -DN     [place] Level (Toileting Goal 1, OT-IRF) maximum assist (25-49% patient effort)  -DN     Progress/Outcomes (Toileting Goal 1, OT-IRF) goal not met  -DN     Time Frame (Toileting Goal 1, OT-IRF) short-term goal (STG)  -DN     Row Name 04/29/22 1204          Toileting Goal 2 (OT-IRF)    Activity/Device (Toileting Goal 2, OT-IRF) toileting skills, all  -DN     [place] Level (Toileting Goal 2, OT-IRF) contact guard required  -DN     Progress/Outcomes (Toileting Goal 2, OT-IRF) continuing progress toward goal;goal ongoing  -DN     Time Frame (Toileting Goal 2, OT-IRF) long-term goal (LTG)  -DN     Row Name 04/29/22 1204          Strength Goal 1 (OT-IRF)    Strength Goal 1 (OT-IRF) pt to be sba with BUE HEP  -DN     Time Frame (Strength Goal 1, OT-IRF) short-term goal (STG)  -DN     Progress/Outcomes (Strength Goal 1, OT-IRF) goal met;goal revised this date  -DN     Row Name 04/29/22 1204          Strength Goal 2 (OT-IRF)    Strength Goal 2 (OT-IRF) pt to be independent with BUE HEP  -DN     Time Frame (Strength Goal 2, OT-IRF) long-term goal (LTG)  -DN     Progress/Outcomes (Strength Goal 2, OT-IRF) goal ongoing  -DN     Row Name 04/29/22 1204           Endurance Goal 1 (OT)    Endurance Goal 1 (OT) during adls  -DN     Activity Level (Endurance Goal 1, OT) endurance 2 fair +  -DN     Time Frame (Endurance Goal 1, OT) short term goal (STG)  -DN     Progress/Outcome (Endurance Goal 1, OT) goal not met  -DN     Row Name 04/29/22 1204          Endurance Goal 2 (OT)    Endurance Goal 2 (OT) durring adls  -DN     Activity Level (Endurance Goal 2, OT) endurance 2 good -  -DN     Time Frame (Endurance Goal 2, OT) long term goal (LTG)  -DN     Progress/Outcome (Endurance Goal 2, OT) goal ongoing  -DN     Row Name 04/29/22 1204          Caregiver Training Goal 1 (OT-IRF)    Caregiver Training Goal 1 (OT-IRF) pt to be SBA with  BUE strength to increase independence with adls  -DN     Time Frame (Caregiver Training Goal 1, OT-IRF) short-term goal (STG)  -DN     Progress/Outcomes (Caregiver Training Goal 1, OT-IRF) goal ongoing  -DN     Row Name 04/29/22 1204          Caregiver Training Goal 2 (OT-IRF)    Caregiver Training Goal 2 (OT-IRF) pt wife to be independent with assist of adls with self care and transfers at time of d/c  -DN     Time Frame (Caregiver Training Goal 2, OT-IRF) long-term goal (LTG)  -DN     Progress/Outcomes (Caregiver Training Goal 2, OT-IRF) goal ongoing  -DN           User Key  (r) = Recorded By, (t) = Taken By, (c) = Cosigned By    Initials Name Effective Dates    Francisco Batista OT 06/16/21 -                  Occupational Therapy Education                 Title: PT OT SLP Therapies (In Progress)     Topic: Occupational Therapy (Not Started)     Point: ADL training (Not Started)     Description:   Instruct learner(s) on proper safety adaptation and remediation techniques during self care or transfers.   Instruct in proper use of assistive devices.              Learner Progress:  Not documented in this visit.          Point: Home exercise program (Not Started)     Description:   Instruct learner(s) on appropriate technique for monitoring, assisting and/or progressing therapeutic exercises/activities.              Learner Progress:  Not documented in this visit.          Point: Precautions (Not Started)     Description:   Instruct learner(s) on prescribed precautions during self-care and functional transfers.              Learner Progress:  Not documented in this visit.          Point: Body mechanics (Not Started)     Description:   Instruct learner(s) on proper positioning and spine alignment during self-care, functional mobility activities and/or exercises.              Learner Progress:  Not documented in this visit.                                OT Recommendation and Plan    Planned Therapy Interventions (OT):  activity tolerance training, adaptive equipment training, BADL retraining, ROM/therapeutic exercise, strengthening exercise, transfer/mobility retraining                    Time Calculation:      Time Calculation- OT     Row Name 04/29/22 1230 04/29/22 0900          Time Calculation- OT    OT Start Time 1230  -DN 0900  -DN     OT Stop Time 1300  -DN 1000  -DN     OT Time Calculation (min) 30 min  -DN 60 min  -DN           User Key  (r) = Recorded By, (t) = Taken By, (c) = Cosigned By    Initials Name Provider Type    Francisco Batista OT Occupational Therapist              Therapy Charges for Today     Code Description Service Date Service Provider Modifiers Qty    35781374258 HC OT SELF CARE/MGMT/TRAIN EA 15 MIN 4/28/2022 Francisco Jane OT GO 3    43661343673 HC OT THER PROC EA 15 MIN 4/28/2022 Francisco Jane OT GO 1    10428910212 HC OT THER PROC EA 15 MIN 4/28/2022 Francisco Jane OT GO 2    34724483856 HC OT SELF CARE/MGMT/TRAIN EA 15 MIN 4/29/2022 Francisco Jane OT GO 1    64247129037 HC OT THER PROC EA 15 MIN 4/29/2022 Francisco Jane OT GO 3    21996712841 HC OT THER PROC EA 15 MIN 4/29/2022 Francisco Jane OT GO 2                   Francisco Jane OT  4/29/2022

## 2022-04-29 NOTE — PROGRESS NOTES
LOS: 9 days   Patient Care Team:  Rubin Hinkle APRN as PCP - General (Family Medicine)  Audra Vazquez [facility] as Pharmacist  Vasquez Niño, PharmD as Pharmacist (Pharmacy)  Tatiana Tinoco MD as Consulting Physician (Gastroenterology)      LAURA XIE  1935    Diagnoses    1. 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  Hypertension.  Impaired mobility/impaired self-care  Chronic kidney disease stage III  Hyperglycemia-Lantus  Anemia-ferrous sulfate  Recent C. difficile colitis-completed vancomycin taper      Subjective     Continues with weakness in the left lower extremity.  Does not describe any pain in the back of the pelvis.  Numbness of the left thigh about the same.  Working on gait activities with knee immobilizer.  Able to advance the left leg with hip flexion      Objective     Vitals:    04/29/22 1206   BP: 119/60   Pulse: 83   Resp: 18   Temp: 97.8 °F (36.6 °C)   SpO2: 99%       PHYSICAL EXAM:      MENTAL STATUS -  AWAKE / ALERT  HEENT-    LUNGS -normal respiration.  Clear to auscultation  HEART-irregular   Prosthetic click  ABD -soft nontender  EXT - NO EDEMA OR CYANOSIS  Back-dressing in place at upper back lesion  NEURO -   MOTOR EXAM - RUE/RLE 5/5. LUE 5/5.    Left hip flexion 2/5,   Knee immobilizer on and physical therapy  Left ankle dorsiflexion 4/5 ,  ankle plantarflexion takes resistance         MEDICATIONS  Scheduled Meds:atorvastatin, 40 mg, Oral, Daily  betamethasone dipropionate, 1 application, Topical, TID  digoxin, 62.5 mcg, Oral, Daily  famotidine, 20 mg, Oral,  Daily  ferrous sulfate, 325 mg, Oral, Every Other Day  insulin glargine, 10 Units, Subcutaneous, Nightly  insulin lispro, 0-9 Units, Subcutaneous, TID AC  lactobacillus acidophilus, 1 capsule, Oral, Daily  magnesium oxide, 400 mg, Oral, BID  metoprolol succinate XL, 12.5 mg, Oral, Daily  nystatin, , Topical, Q12H  warfarin, 5 mg, Oral, Once per day on Sun Tue Thu Sat  warfarin, 7.5 mg, Oral, Once per day on Mon Wed Fri      Continuous Infusions:Pharmacy to dose warfarin,       PRN Meds:.•  acetaminophen **OR** [DISCONTINUED] acetaminophen **OR** [DISCONTINUED] acetaminophen  •  calcium carbonate  •  dextrose  •  dextrose  •  diphenhydrAMINE  •  glucagon (human recombinant)  •  hydrocortisone-bacitracin-zinc oxide-nystatin  •  nitroglycerin  •  ondansetron **OR** ondansetron  •  Pharmacy to dose warfarin  •  Sodium Hypochlorite 0.0625 % (Dakin's 1/8th Strength) topical solution      RESULTS  Glucose   Date/Time Value Ref Range Status   04/29/2022 1126 175 (H) 70 - 130 mg/dL Final     Comment:     Meter: [id number] : [id number] [facility]   04/29/2022 0605 115 70 - 130 mg/dL Final     Comment:     Meter: [id number] : [id number] [facility]   04/28/2022 2033 139 (H) 70 - 130 mg/dL Final     Comment:     Meter: [id number] : [id number] [facility]   04/28/2022 1605 198 (H) 70 - 130 mg/dL Final     Comment:     Meter: [id number] : [id number] [facility]   04/28/2022 1045 222 (H) 70 - 130 mg/dL Final     Comment:     Meter: [id number] : [id number] [facility]   04/28/2022 0609 98 70 - 130 mg/dL Final     Comment:     Meter: [id number] : [id number] [facility]   04/27/2022 2043 139 (H) 70 - 130 mg/dL Final     Comment:     Meter: [id number] : [id number] [facility]   04/27/2022 1632 130 70 - 130 mg/dL Final     Comment:     Meter: [id number] : [id number] Sai ALEJO     Results from last 7 days   Lab Units 04/29/22  0742 04/26/22  0749  04/25/22  1037   WBC 10*3/mm3 3.61 4.01 5.41   HEMOGLOBIN g/dL 9.4* 9.6* 10.3*   HEMATOCRIT % 29.9* 30.7* 33.6*   PLATELETS 10*3/mm3 210 209 265     Results from last 7 days   Lab Units 04/29/22  0742 04/25/22  1037   SODIUM mmol/L 139 134*   POTASSIUM mmol/L 4.4 4.4   CHLORIDE mmol/L 104 100   CO2 mmol/L 26.4 22.2   BUN mg/dL 24* 22   CREATININE mg/dL 1.18 1.32*   CALCIUM mg/dL 8.9 8.5*   GLUCOSE mg/dL 145* 193*       Results from last 7 days   Lab Units 04/29/22  0742 04/27/22  1041 04/26/22  0749   INR  3.93* 3.49* 3.46*         Assessment/Plan     Hematoma of left iliopsoas muscle      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  The hip weakness is probably commendation of hematoma and iliopsoas as well as nerve impingement.  He is weaker with knee extension compared to hip adduction which would point to also component of femoral nerve palsy.     Reviewed features of upper lumbosacral plexopathy greater than lower lumbosacral plexopathy with the patient and his wife, reviewed timeframe of recovery is typically measured in months and may be incomplete, and reviewed need for bracing which will be determined and see how he progresses with his rehabilitation program.  Reviewed different styles of knee orthosis including dynamic knee extension versus lockable knee unit depending on level of stability he needs.    Discussed will likely need dynamic knee extension brace with tension bands on post anterior to the knee joint with locking mechanism initially, and then unlocking as he gains better proficiency utilizing tension bands to extend the knee        History of mechanical heart valve-  Mechanical aortic valve replacement  Prior CVA with residual left-sided weakness (INR at 2.2)  Chronic anticoagulation with goal INR of 3 -  3.5  INR goal 3.0-3.5 secondary to previous stroke-pharmacy managing  April 21-INR 2.92.  Discontinue Lovenox.  Continue Coumadin  April 22-INR trended back down to 2.5 to.  Had been on Coumadin 7.5 mg through April 19, received 10 mg on April 20, increased INR from 2.07 to 2.92.  Given the rate of  increase in INR, Coumadin was held yesterday.  INR trended back down to 2.52 today.  Anticipate Coumadin 10 mg dose today.  Will recheck INR this afternoon around 3:30 PM and if trend lower, will give Lovenox 70 mg subcutaneous x1 and recheck INR in the a.m.. Concern would be for re-bleed as previously bleed on ASA and Lovenox to coumadin transition when INR was 1.6. Previous stroke when INR was 2.2.   April 25-INR 3.2  April 27-INR 3.49    INR goal 3.0-3.5  April 29-INR 3.93-pharmacy to adjust dosing.  Hold today, recheck INR in the morning, and adjust Coumadin pending results     Atrial fibrillation-digoxin/metoprolol  Nonischemic cardiomyopathy.  Hypertension.     Impaired mobility/impaired self-care     History of lower GI bleed with hematochezia in November 2021  Right rectus sheath hematoma in November 2021     Chronic kidney disease stage III  History of renal cell carcinoma and right nephrectomy      Hyperglycemia-Lantus  April 21-blood glucose low this morning and decrease Lantus     Anemia-ferrous sulfate     Recent C. difficile colitis-completed vancomycin taper    Dermatologic-chronic upper back lesion-seen by dermatology-Lichen simplex chronicus would be the main possibility with some excoriation.  Some element of contact dermatitis may also be superimposed.  However, cannot rule out a either Bowen's disease or superficial basal cell carcinoma, cutaneous T-cell lymphoma, lupus erythematosus, deep fungus infection, or diabetic dermopathy/necrobiosis lipoidica.  A biopsy at this time would be a somewhat confusing because of all the information, so I will try reducing: The information and see if anything  remains.  Typically the lichen simplex chronicus will improve gradually over the course of time, but a omalignancy will not.   Treatment: Dakin's solution as a cool to cold compress 3 times a day and follow with betamethasone dipropionate ointment 0.05% 3 times a day.  Consider for biopsy if no change.    Team conference-April 26-toilet transfers moderate assist squat pivot.  Gait 14 feet mod assist of 2, left knee blocked.  Transfers moderate assist squat pivot.  Bed mobility standby assist.  Bathing moderate assist.  Lower body dressing moderate-max assist.  Upper body dressing standby assist.  Grooming standby assist.  Toileting max assist of 1-2 persons.  Continent/incontinent of bowel and bladder.  Estimate length of stay-3 weeks     Now admit for comprehensive acute inpatient rehabilitation .  This would be an interdisciplinary program with physical therapy 1.5 hour,  occupational therapy 1.5 hour,  5 days a week.  Rehabilitation nursing for carryover, monitoring of cardiac and neurologic   status, bowel and bladder, and skin  Ongoing physician follow-up.  Weekly team conferences.  Goals are to achieve a level of contact-guard assist with  mobility and self-care and improved strength.   Rehabilitation prognosis indeterminate.  Medical prognosis indeterminate.  Estimated length of stay is approximately 2 weeks, but is only an estimation.      The patient's functional status and clinical status is unchanged from preadmission assessment and the patient continues appropriate for acute inpatient rehabilitation.  Goal is for home with outpatient   therapies.  Barrier to discharge: Impaired mobility and self-care- work on strength, transfers, balance, progressive ambulation, bracing, activities of daily living to overcome.                 Grant Andrade MD      During rounds, used appropriate personal protective equipment including mask and gloves.  Additional gown if indicated.  Mask used was standard  procedure mask. Appropriate PPE was worn during the entire visit.  Hand hygiene was completed before and after.

## 2022-04-30 LAB
GLUCOSE BLDC GLUCOMTR-MCNC: 106 MG/DL (ref 70–130)
GLUCOSE BLDC GLUCOMTR-MCNC: 159 MG/DL (ref 70–130)
GLUCOSE BLDC GLUCOMTR-MCNC: 168 MG/DL (ref 70–130)
GLUCOSE BLDC GLUCOMTR-MCNC: 193 MG/DL (ref 70–130)
INR PPP: 3.92 (ref 0.9–1.1)
PROTHROMBIN TIME: 38.7 SECONDS (ref 11.7–14.2)

## 2022-04-30 PROCEDURE — 97116 GAIT TRAINING THERAPY: CPT

## 2022-04-30 PROCEDURE — 97110 THERAPEUTIC EXERCISES: CPT

## 2022-04-30 PROCEDURE — 82962 GLUCOSE BLOOD TEST: CPT

## 2022-04-30 PROCEDURE — 85610 PROTHROMBIN TIME: CPT | Performed by: PHYSICAL MEDICINE & REHABILITATION

## 2022-04-30 RX ADMIN — BETAMETHASONE DIPROPIONATE 1 APPLICATION: 0.5 OINTMENT TOPICAL at 22:55

## 2022-04-30 RX ADMIN — FAMOTIDINE 20 MG: 20 TABLET ORAL at 10:01

## 2022-04-30 RX ADMIN — DAKIN'S SOLUTION 0.125% (QUARTER STRENGTH) 946 ML: 0.12 SOLUTION at 10:02

## 2022-04-30 RX ADMIN — FERROUS SULFATE TAB 325 MG (65 MG ELEMENTAL FE) 325 MG: 325 (65 FE) TAB at 10:00

## 2022-04-30 RX ADMIN — METOPROLOL SUCCINATE 12.5 MG: 25 TABLET, EXTENDED RELEASE ORAL at 10:01

## 2022-04-30 RX ADMIN — Medication 1 CAPSULE: at 10:01

## 2022-04-30 RX ADMIN — DAKIN'S SOLUTION 0.125% (QUARTER STRENGTH) 946 ML: 0.12 SOLUTION at 22:55

## 2022-04-30 RX ADMIN — MAGNESIUM OXIDE 400 MG (241.3 MG MAGNESIUM) TABLET 400 MG: TABLET at 10:00

## 2022-04-30 RX ADMIN — NYSTATIN: 100000 POWDER TOPICAL at 22:55

## 2022-04-30 RX ADMIN — ATORVASTATIN CALCIUM 40 MG: 20 TABLET, FILM COATED ORAL at 10:00

## 2022-04-30 RX ADMIN — MAGNESIUM OXIDE 400 MG (241.3 MG MAGNESIUM) TABLET 400 MG: TABLET at 22:55

## 2022-04-30 RX ADMIN — BETAMETHASONE DIPROPIONATE 1 APPLICATION: 0.5 OINTMENT TOPICAL at 10:00

## 2022-04-30 RX ADMIN — NYSTATIN: 100000 POWDER TOPICAL at 10:01

## 2022-04-30 RX ADMIN — DIGOXIN 62.5 MCG: 125 TABLET ORAL at 11:55

## 2022-04-30 NOTE — PLAN OF CARE
Goal Outcome Evaluation:  Plan of Care Reviewed With: patient        Progress: improving  Outcome Evaluation: Pt has been visiting wigth wife this shift. Coumadin on hold. Buttocks intact. Speciality mattrress in place.

## 2022-04-30 NOTE — PROGRESS NOTES
[facility] Clinical Pharmacy Services: Warfarin Dosing/Monitoring Consult    Francisco Sequeira is a 86 y.o. male, estimated creatinine clearance is 39.7 mL/min (by C-G formula based on SCr of 1.18 mg/dL). weighing 60.2 kg (132 lb 11.5 oz).    Results from last 7 days   Lab Units 04/30/22  0631 04/29/22  0742 04/27/22  1041 04/26/22  0749 04/25/22  1037 04/24/22  0433   INR  3.92* 3.93* 3.49* 3.46* 3.23* 3.27*   HEMOGLOBIN g/dL  --  9.4*  --  9.6* 10.3* 9.2*   HEMATOCRIT %  --  29.9*  --  30.7* 33.6* 30.8*   PLATELETS 10*3/mm3  --  210  --  209 265 208     Prior to admission anticoagulation: Per [facility] clinic visit note - pt takes warfarin 7.5 mg every Mon, Wed, Fri; 5 mg all other days    Hospital Anticoagulation:  Consulting provider: Dr. Andrade  Start date: 4/20/22  Indication: Mechanical valve  Target INR:  3-3.5  Expected duration: tbd   Bridge Therapy: no. Lovenox stopped on 4/21.    Potential food or drug interactions: none currently    Education complete?/Date: Home medication    Assessment/Plan:  INR remains supra-therapeutic at 3.92 today. Will hold warfarin again today. Resume daily INR check. Will have to adjust home regimen (home weekly dose = 42.5 mg) after INR within goal again.  Monitor for any signs or symptoms of bleeding  INR daily. Will follow up tomorrow    Pharmacy will continue to follow until discharge or discontinuation of warfarin.     Braydon Hinkle [facility]  Clinical Pharmacist

## 2022-04-30 NOTE — THERAPY TREATMENT NOTE
Patient Name: Francisco Sequeira  : 1935    MRN: [id number]                              Today's Date: 2022       Admit Date: 2022    Visit Dx:     ICD-10-CM ICD-9-CM   1. 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)   • Iron deficiency anemia   • Chronic anticoagulation   • Hemiparesis of left nondominant side as late effect of cerebral infarction (HCC)   • Rectus sheath hematoma   • Sepsis (HCC)   • Calculus of gallbladder with acute cholecystitis without obstruction   • History of Clostridium difficile infection   • Aspiration pneumonitis (HCC)   • Hematoma of iliopsoas muscle, left, initial encounter   • History of CVA (cerebrovascular accident)   • S/P laparoscopic cholecystectomy   • Anemia   • Hematoma of left iliopsoas muscle     Past Medical History:   Diagnosis Date   • Allergic rhinitis    • Aortic valve insufficiency    • Ascending aortic aneurysm (HCC)    • Atrial fibrillation (HCC)    • Bacteremia    • Calcific aortic stenosis of bicuspid valve    • Cardiac arrest (HCC)    • Cardiomyopathy (HCC)    • CKD (chronic kidney disease) stage 3, GFR 30-59 ml/min (HCC)    • Contact dermatitis due to poison ivy    • Elbow fracture    • Esophageal reflux    • GERD (gastroesophageal reflux disease)    • Head injury    • History of transfusion    • Hyperglycemia    • Hyperlipidemia    • Hypertension    • Kidney carcinoma (HCC)    • Nonischemic cardiomyopathy (HCC)    • Renal oncocytoma    • Seasonal allergic  reaction    • Sinusitis    • Syncope    • Type 2 diabetes mellitus (HCC)     uncontrolled   • Visual field defect      Past Surgical History:   Procedure Laterality Date   • AORTIC VALVE REPAIR/REPLACEMENT     • ASCENDING AORTIC ANEURYSM REPAIR W/ MECHANICAL AORTIC VALVE REPLACEMENT     • CHOLECYSTECTOMY WITH INTRAOPERATIVE CHOLANGIOGRAM N/A 3/29/2022    Procedure: Laparoscopic cholecystectomy with cholangiogram, possible open;  Surgeon: Lisa Guidry MD;  Location: [facility] MAIN OR;  Service: General;  Laterality: N/A;   • COLONOSCOPY N/A 10/28/2021    Procedure: COLONOSCOPY to cecum:  cold snare polyps,;  Surgeon: Dinesh Meza MD;  Location: [facility] ENDOSCOPY;  Service: Gastroenterology;  Laterality: N/A;  pre:  Iron deficiency anemia  post:  polyps, diverticulosis,    • COLONOSCOPY N/A 11/9/2021    Procedure: COLONOSCOPY to cecum with APC cautery of AVM and clip placement x1;  Surgeon: Pavan Rodriguez MD;  Location: [facility] ENDOSCOPY;  Service: Gastroenterology;  Laterality: N/A;  PRE - gi bleed, anemia  POST - diverticulosis, poor prep, AVM right colon   • ENDOSCOPY N/A 10/28/2021    Procedure: ESOPHAGOGASTRODUODENOSCOPY with biopsies;  Surgeon: Dinesh Meza MD;  Location: [facility] ENDOSCOPY;  Service: Gastroenterology;  Laterality: N/A;  pre:  Iron deficiency anemia  post:  duodenitis and gastritis   • EYE SURGERY  12/09/2020    cataract surgery    • NEPHRECTOMY     • OTHER SURGICAL HISTORY      elbow surgery   • OTHER SURGICAL HISTORY      right arm surgery   • PROSTATE SURGERY     • THORACENTESIS Left     diagnostic      General Information     Row Name 04/30/22 0930          Physical Therapy Time and Intention    Document Type therapy note (daily note)  -MD     Mode of Treatment physical therapy  -MD     Row Name 04/30/22 0930          General Information    Patient Profile Reviewed yes  -MD     Existing Precautions/Restrictions fall  KI on L LE when ambulating  -MD     Row Name  04/30/22 0930          Cognition    Orientation Status (Cognition) oriented x 4  -MD           User Key  (r) = Recorded By, (t) = Taken By, (c) = Cosigned By    Initials Name Provider Type    Thuy Hurst, PT Physical Therapist               Mobility     Row Name 04/30/22 0931          Sit-Stand Transfer    Sit-Stand [place] (Transfers) contact guard;minimum assist (75% patient effort);verbal cues  -MD     Assistive Device (Sit-Stand Transfers) walker, front-wheeled  -MD     Row Name 04/30/22 0931          Gait/Stairs (Locomotion)    [place] Level (Gait) verbal cues;minimum assist (75% patient effort)  -MD     Assistive Device (Gait) walker, front-wheeled  -MD     Distance in Feet (Gait) 40'x3  -MD     Deviations/Abnormal Patterns (Gait) base of support, narrow;raul decreased;gait speed decreased  -MD     Bilateral Gait Deviations forward flexed posture  -MD     Left Sided Gait Deviations knee buckling, left side;heel strike decreased;weight shift ability decreased  -MD     Comment, (Gait/Stairs) KI on when ambulating  -MD           User Key  (r) = Recorded By, (t) = Taken By, (c) = Cosigned By    Initials Name Provider Type    Thuy Hurst, PT Physical Therapist               Obj/Interventions     Row Name 04/30/22 0946          Motor Skills    Therapeutic Exercise other (see comments)  R hip seated marches x10 reps, L hip flexion AAROM x10 reps, R knee extension x10 reps, L knee extension AAROM x10 reps, B DF x10 reps  -MD           User Key  (r) = Recorded By, (t) = Taken By, (c) = Cosigned By    Initials Name Provider Type    Thuy Hurst, PT Physical Therapist               Goals/Plan    No documentation.                Clinical Impression     Row Name 04/30/22 0932          Pain    Pretreatment Pain Rating 0/10 - no pain  -MD     Row Name 04/30/22 0932          Positioning and Restraints    Pre-Treatment Position sitting in chair/recliner  -MD     Post Treatment Position wheelchair  -MD     In  Wheelchair sitting;call light within reach;exit alarm on  -MD           User Key  (r) = Recorded By, (t) = Taken By, (c) = Cosigned By    Initials Name Provider Type    Thuy Hurst PT Physical Therapist               Outcome Measures    No documentation.                              Physical Therapy Education                 Title: PT OT SLP Therapies (In Progress)     Topic: Physical Therapy (Done)     Point: Mobility training (Done)     Learning Progress Summary           Patient Acceptance, E,TB, NR,VU by MS at 4/27/2022 1119    Acceptance, E,TB, VU,NR by MS at 4/26/2022 1458    Acceptance, E,TB, VU,NR by MS at 4/25/2022 1422    Acceptance, E, NR,VU,DU by  at 4/23/2022 1130    Acceptance, E,TB, VU by MS at 4/21/2022 1102   Significant Other Acceptance, E,TB, NR,VU by MS at 4/27/2022 1119    Acceptance, E,TB, VU by MS at 4/21/2022 1102                   Point: Home exercise program (Done)     Learning Progress Summary           Patient Acceptance, E,TB, NR,VU by MS at 4/27/2022 1119    Acceptance, E,TB, VU,NR by MS at 4/26/2022 1458    Acceptance, E,TB, VU,NR by MS at 4/25/2022 1422    Acceptance, E, NR,VU,DU by  at 4/23/2022 1130    Acceptance, E,TB, VU by MS at 4/21/2022 1102   Significant Other Acceptance, E,TB, NR,VU by MS at 4/27/2022 1119    Acceptance, E,TB, VU by MS at 4/21/2022 1102                   Point: Body mechanics (Done)     Learning Progress Summary           Patient Acceptance, E,TB, NR,VU by MS at 4/27/2022 1119    Acceptance, E,TB, VU,NR by MS at 4/26/2022 1458    Acceptance, E,TB, VU,NR by MS at 4/25/2022 1422    Acceptance, E, NR,VU,DU by  at 4/23/2022 1130    Acceptance, E,TB, VU by MS at 4/21/2022 1102   Significant Other Acceptance, E,TB, NR,VU by MS at 4/27/2022 1119    Acceptance, E,TB, VU by MS at 4/21/2022 1102                   Point: Precautions (Done)     Learning Progress Summary           Patient Acceptance, E, VU by MD at 4/30/2022 0933    Acceptance, E,TB, NR,VU by MS  at 4/27/2022 1119    Acceptance, E,TB, VU,NR by MS at 4/26/2022 1458    Acceptance, E,TB, VU,NR by MS at 4/25/2022 1422    Acceptance, E, NR,VU,DU by  at 4/23/2022 1130    Acceptance, E,TB, VU by MS at 4/21/2022 1102   Significant Other Acceptance, E,TB, NR,VU by MS at 4/27/2022 1119    Acceptance, E,TB, VU by MS at 4/21/2022 1102                               User Key     Initials Effective Dates Name Provider Type Discipline     06/16/21 -  Sandi Shepard PT Physical Therapist PT    MD 06/16/21 -  Thuy Shah PT Physical Therapist PT    MS 06/16/21 -  Goldei Abrams PT Physical Therapist PT              PT Recommendation and Plan           Time Calculation:    PT Charges     Row Name 04/30/22 0922             Time Calculation    Start Time 0922  -MD      Stop Time 0955  -MD      Time Calculation (min) 33 min  -MD      PT Received On 04/30/22  -MD      PT - Next Appointment 05/02/22  -MD            User Key  (r) = Recorded By, (t) = Taken By, (c) = Cosigned By    Initials Name Provider Type    Thuy Hurst, PT Physical Therapist              Therapy Charges for Today     Code Description Service Date Service Provider Modifiers Qty    05770461763 HC PT THER PROC EA 15 MIN 4/30/2022 Thuy Shah, PT GP 1    82512452198 HC GAIT TRAINING EA 15 MIN 4/30/2022 Thuy Shah, PT GP 1    69434223250 HC PT THER SUPP EA 15 MIN 4/30/2022 Thuy Shah, PT GP 2        Patient was wearing a face mask during this therapy encounter. Therapist used appropriate personal protective equipment including mask and gloves.  Mask used was standard procedure mask. Appropriate PPE was worn during the entire therapy session. Hand hygiene was completed before and after therapy session. Patient is not in enhanced droplet precautions.            Thuy Shah PT  4/30/2022

## 2022-04-30 NOTE — PROGRESS NOTES
LOS: 10 days   Patient Care Team:  Rubin Hinkle APRN as PCP - General (Family Medicine)  Audra Vazquez [facility] as Pharmacist  Vasquez Niño, PharmD as Pharmacist (Pharmacy)  Tatiana Tinoco MD as Consulting Physician (Gastroenterology)      LAURA XIE  1935    Diagnoses    1. 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  Hypertension.  Impaired mobility/impaired self-care  Chronic kidney disease stage III  Hyperglycemia-Lantus  Anemia-ferrous sulfate  Recent C. difficile colitis-completed vancomycin taper      Subjective     Seen and examined, no acute events overnight. Feels OK, denies chest pain, shortness of breath, f/c. Slept well. BM this morning. Feels strength is improving, moving leg easier.         Objective     Vitals:    04/30/22 1237   BP: 123/76   Pulse: 77   Resp: 18   Temp: 97.9 °F (36.6 °C)   SpO2: 100%       PHYSICAL EXAM:      MENTAL STATUS -  AWAKE / ALERT  HEENT-    LUNGS -normal respiration.  Clear to auscultation  HEART-irregular   Prosthetic click  ABD -soft nontender  EXT - NO EDEMA OR CYANOSIS  Back-dressing in place at upper back lesion  NEURO -   MOTOR EXAM - RUE/RLE 5/5. LUE 5/5.    Left hip flexion 2/5,   Knee immobilizer on and physical therapy  Left ankle dorsiflexion 4/5 ,  ankle plantarflexion takes resistance         MEDICATIONS  Scheduled Meds:atorvastatin, 40 mg, Oral, Daily  betamethasone dipropionate, 1 application, Topical, TID  digoxin, 62.5 mcg, Oral, Daily  famotidine, 20 mg, Oral, Daily  ferrous sulfate, 325 mg, Oral, Every Other Day  insulin glargine, 10  Units, Subcutaneous, Nightly  insulin lispro, 0-9 Units, Subcutaneous, TID AC  lactobacillus acidophilus, 1 capsule, Oral, Daily  magnesium oxide, 400 mg, Oral, BID  metoprolol succinate XL, 12.5 mg, Oral, Daily  nystatin, , Topical, Q12H      Continuous Infusions:Pharmacy to dose warfarin,       PRN Meds:.•  acetaminophen **OR** [DISCONTINUED] acetaminophen **OR** [DISCONTINUED] acetaminophen  •  calcium carbonate  •  dextrose  •  dextrose  •  diphenhydrAMINE  •  glucagon (human recombinant)  •  hydrocortisone-bacitracin-zinc oxide-nystatin  •  nitroglycerin  •  ondansetron **OR** ondansetron  •  Pharmacy to dose warfarin  •  Sodium Hypochlorite 0.0625 % (Dakin's 1/8th Strength) topical solution      RESULTS  Glucose   Date/Time Value Ref Range Status   04/30/2022 1115 168 (H) 70 - 130 mg/dL Final     Comment:     Meter: [id number] : [id number] [facility]   04/30/2022 0645 106 70 - 130 mg/dL Final     Comment:     Meter: [id number] : [id number] Chandnichristian Pappas [facility]   04/29/2022 2008 207 (H) 70 - 130 mg/dL Final     Comment:     Meter: [id number] : [id number] Miguel Ava Mireya NA   04/29/2022 1600 178 (H) 70 - 130 mg/dL Final     Comment:     Meter: [id number] : [id number] [facility]   04/29/2022 1126 175 (H) 70 - 130 mg/dL Final     Comment:     Meter: [id number] : [id number] [facility]   04/29/2022 0605 115 70 - 130 mg/dL Final     Comment:     Meter: [id number] : [id number] [facility]   04/28/2022 2033 139 (H) 70 - 130 mg/dL Final     Comment:     Meter: [id number] : [id number] [facility]   04/28/2022 1605 198 (H) 70 - 130 mg/dL Final     Comment:     Meter: [id number] : [id number] Alexia ALEJO     Results from last 7 days   Lab Units 04/29/22  0742 04/26/22  0749 04/25/22  1037   WBC 10*3/mm3 3.61 4.01 5.41   HEMOGLOBIN g/dL 9.4* 9.6* 10.3*   HEMATOCRIT % 29.9* 30.7* 33.6*   PLATELETS 10*3/mm3 210 209 265     Results from last 7 days   Lab  Units 04/29/22  0742 04/25/22  1037   SODIUM mmol/L 139 134*   POTASSIUM mmol/L 4.4 4.4   CHLORIDE mmol/L 104 100   CO2 mmol/L 26.4 22.2   BUN mg/dL 24* 22   CREATININE mg/dL 1.18 1.32*   CALCIUM mg/dL 8.9 8.5*   GLUCOSE mg/dL 145* 193*       Results from last 7 days   Lab Units 04/30/22  0631 04/29/22  0742 04/27/22  1041   INR  3.92* 3.93* 3.49*         Assessment/Plan     Hematoma of left iliopsoas muscle      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  The hip weakness is probably commendation of hematoma and iliopsoas as well as nerve impingement.  He is weaker with knee extension compared to hip adduction which would point to also component of femoral nerve palsy.     Reviewed features of upper lumbosacral plexopathy greater than lower lumbosacral plexopathy with the patient and his wife, reviewed timeframe of recovery is typically measured in months and may be incomplete, and reviewed need for bracing which will be determined and see how he progresses with his rehabilitation program.  Reviewed different styles of knee orthosis including dynamic knee extension versus lockable knee unit depending on level of stability he needs.    Discussed will likely need dynamic knee extension brace with tension bands on post anterior to the knee joint with locking mechanism initially, and then unlocking as he gains better proficiency utilizing tension bands to extend the knee        History of mechanical heart valve-  Mechanical aortic valve replacement  Prior CVA with residual left-sided weakness (INR at 2.2)  Chronic anticoagulation with goal INR of 3 - 3.5  INR goal 3.0-3.5 secondary to previous stroke-pharmacy managing  April 21-INR 2.92.  Discontinue Lovenox.  Continue Coumadin  April 22-INR trended back down to 2.5 to.  Had been  on Coumadin 7.5 mg through April 19, received 10 mg on April 20, increased INR from 2.07 to 2.92.  Given the rate of  increase in INR, Coumadin was held yesterday.  INR trended back down to 2.52 today.  Anticipate Coumadin 10 mg dose today.  Will recheck INR this afternoon around 3:30 PM and if trend lower, will give Lovenox 70 mg subcutaneous x1 and recheck INR in the a.m.. Concern would be for re-bleed as previously bleed on ASA and Lovenox to coumadin transition when INR was 1.6. Previous stroke when INR was 2.2.   April 25-INR 3.2  April 27-INR 3.49    INR goal 3.0-3.5  April 29-INR 3.93-pharmacy to adjust dosing.  Hold today, recheck INR in the morning, and adjust Coumadin pending results     Atrial fibrillation-digoxin/metoprolol  Nonischemic cardiomyopathy.  Hypertension.     Impaired mobility/impaired self-care     History of lower GI bleed with hematochezia in November 2021  Right rectus sheath hematoma in November 2021     Chronic kidney disease stage III  History of renal cell carcinoma and right nephrectomy      Hyperglycemia-Lantus  April 21-blood glucose low this morning and decrease Lantus     Anemia-ferrous sulfate     Recent C. difficile colitis-completed vancomycin taper    Dermatologic-chronic upper back lesion-seen by dermatology-Lichen simplex chronicus would be the main possibility with some excoriation.  Some element of contact dermatitis may also be superimposed.  However, cannot rule out a either Bowen's disease or superficial basal cell carcinoma, cutaneous T-cell lymphoma, lupus erythematosus, deep fungus infection, or diabetic dermopathy/necrobiosis lipoidica.  A biopsy at this time would be a somewhat confusing because of all the information, so I will try reducing: The information and see if anything remains.  Typically the lichen simplex chronicus will improve gradually over the course of time, but a omalignancy will not.   Treatment: Dakin's solution as a cool to cold compress 3 times  a day and follow with betamethasone dipropionate ointment 0.05% 3 times a day.  Consider for biopsy if no change.    Team conference-April 26-toilet transfers moderate assist squat pivot.  Gait 14 feet mod assist of 2, left knee blocked.  Transfers moderate assist squat pivot.  Bed mobility standby assist.  Bathing moderate assist.  Lower body dressing moderate-max assist.  Upper body dressing standby assist.  Grooming standby assist.  Toileting max assist of 1-2 persons.  Continent/incontinent of bowel and bladder.  Estimate length of stay-3 weeks     Now admit for comprehensive acute inpatient rehabilitation .  This would be an interdisciplinary program with physical therapy 1.5 hour,  occupational therapy 1.5 hour,  5 days a week.  Rehabilitation nursing for carryover, monitoring of cardiac and neurologic   status, bowel and bladder, and skin  Ongoing physician follow-up.  Weekly team conferences.  Goals are to achieve a level of contact-guard assist with  mobility and self-care and improved strength.   Rehabilitation prognosis indeterminate.  Medical prognosis indeterminate.  Estimated length of stay is approximately 2 weeks, but is only an estimation.      The patient's functional status and clinical status is unchanged from preadmission assessment and the patient continues appropriate for acute inpatient rehabilitation.  Goal is for home with outpatient   therapies.  Barrier to discharge: Impaired mobility and self-care- work on strength, transfers, balance, progressive ambulation, bracing, activities of daily living to overcome.          Pharmacy dosing Coumadin. Bactroban ointment ordered for Left shoulder abrasion, Decrease Lantus to 10U at HS.     4/30  - Continue comprehensive inpatient rehabilitation program  - Continue close medical monitoring for functional progress, decline or additional intervenable factors to amplify functional recovery  - Continue digoxin and  Metoprolol  - Still with significant  drop in BG overnight, if continues may need to further decrease lantus, monitor for now      Vladimir Miles MD      During rounds, used appropriate personal protective equipment including mask and gloves.  Additional gown if indicated.  Mask used was standard procedure mask. Appropriate PPE was worn during the entire visit.  Hand hygiene was completed before and after.

## 2022-04-30 NOTE — PLAN OF CARE
Goal Outcome Evaluation:      Pt A/Ox4, calm, cooperative, not OOB overnight. Pt able to turn in bed independently; pt prefers to reposition himself. Pt is on low air loss mattress.  Sacral mepilex to coccyx. Pt was continent (using urinal) of urine with some incontinence overnight.  Meds taken whole in puree. No c/o pain overnight.

## 2022-05-01 LAB
GLUCOSE BLDC GLUCOMTR-MCNC: 106 MG/DL (ref 70–130)
GLUCOSE BLDC GLUCOMTR-MCNC: 129 MG/DL (ref 70–130)
GLUCOSE BLDC GLUCOMTR-MCNC: 150 MG/DL (ref 70–130)
GLUCOSE BLDC GLUCOMTR-MCNC: 154 MG/DL (ref 70–130)
INR PPP: 3.1 (ref 0.9–1.1)
PROTHROMBIN TIME: 32.2 SECONDS (ref 11.7–14.2)

## 2022-05-01 PROCEDURE — 82962 GLUCOSE BLOOD TEST: CPT

## 2022-05-01 PROCEDURE — 85610 PROTHROMBIN TIME: CPT | Performed by: PHYSICAL MEDICINE & REHABILITATION

## 2022-05-01 RX ORDER — WARFARIN SODIUM 5 MG/1
5 TABLET ORAL
Status: COMPLETED | OUTPATIENT
Start: 2022-05-01 | End: 2022-05-01

## 2022-05-01 RX ADMIN — ATORVASTATIN CALCIUM 40 MG: 20 TABLET, FILM COATED ORAL at 09:29

## 2022-05-01 RX ADMIN — DAKIN'S SOLUTION 0.125% (QUARTER STRENGTH) 946 ML: 0.12 SOLUTION at 20:53

## 2022-05-01 RX ADMIN — WARFARIN SODIUM 5 MG: 5 TABLET ORAL at 17:20

## 2022-05-01 RX ADMIN — NYSTATIN: 100000 POWDER TOPICAL at 20:53

## 2022-05-01 RX ADMIN — BETAMETHASONE DIPROPIONATE 1 APPLICATION: 0.5 OINTMENT TOPICAL at 21:22

## 2022-05-01 RX ADMIN — METOPROLOL SUCCINATE 12.5 MG: 25 TABLET, EXTENDED RELEASE ORAL at 09:29

## 2022-05-01 RX ADMIN — DAKIN'S SOLUTION 0.125% (QUARTER STRENGTH) 946 ML: 0.12 SOLUTION at 09:28

## 2022-05-01 RX ADMIN — FAMOTIDINE 20 MG: 20 TABLET ORAL at 09:29

## 2022-05-01 RX ADMIN — BETAMETHASONE DIPROPIONATE 1 APPLICATION: 0.5 OINTMENT TOPICAL at 16:25

## 2022-05-01 RX ADMIN — MAGNESIUM OXIDE 400 MG (241.3 MG MAGNESIUM) TABLET 400 MG: TABLET at 09:29

## 2022-05-01 RX ADMIN — MAGNESIUM OXIDE 400 MG (241.3 MG MAGNESIUM) TABLET 400 MG: TABLET at 20:52

## 2022-05-01 RX ADMIN — DIGOXIN 62.5 MCG: 125 TABLET ORAL at 11:51

## 2022-05-01 RX ADMIN — NYSTATIN: 100000 POWDER TOPICAL at 09:29

## 2022-05-01 RX ADMIN — BETAMETHASONE DIPROPIONATE 1 APPLICATION: 0.5 OINTMENT TOPICAL at 09:29

## 2022-05-01 RX ADMIN — Medication 1 CAPSULE: at 09:29

## 2022-05-02 LAB
ANION GAP SERPL CALCULATED.3IONS-SCNC: 9.3 MMOL/L (ref 5–15)
BUN SERPL-MCNC: 22 MG/DL (ref 8–23)
BUN/CREAT SERPL: 19 (ref 7–25)
CALCIUM SPEC-SCNC: 8.7 MG/DL (ref 8.6–10.5)
CHLORIDE SERPL-SCNC: 104 MMOL/L (ref 98–107)
CO2 SERPL-SCNC: 23.7 MMOL/L (ref 22–29)
CREAT SERPL-MCNC: 1.16 MG/DL (ref 0.76–1.27)
DEPRECATED RDW RBC AUTO: 44.9 FL (ref 37–54)
EGFRCR SERPLBLD CKD-EPI 2021: 62.1 ML/MIN/1.73
ERYTHROCYTE [DISTWIDTH] IN BLOOD BY AUTOMATED COUNT: 14.9 % (ref 12.3–15.4)
GLUCOSE BLDC GLUCOMTR-MCNC: 101 MG/DL (ref 70–130)
GLUCOSE BLDC GLUCOMTR-MCNC: 144 MG/DL (ref 70–130)
GLUCOSE BLDC GLUCOMTR-MCNC: 163 MG/DL (ref 70–130)
GLUCOSE BLDC GLUCOMTR-MCNC: 171 MG/DL (ref 70–130)
GLUCOSE SERPL-MCNC: 235 MG/DL (ref 65–99)
HCT VFR BLD AUTO: 31.5 % (ref 37.5–51)
HGB BLD-MCNC: 9.7 G/DL (ref 13–17.7)
INR PPP: 2.32 (ref 0.9–1.1)
MCH RBC QN AUTO: 25.7 PG (ref 26.6–33)
MCHC RBC AUTO-ENTMCNC: 30.8 G/DL (ref 31.5–35.7)
MCV RBC AUTO: 83.3 FL (ref 79–97)
PLATELET # BLD AUTO: 197 10*3/MM3 (ref 140–450)
PMV BLD AUTO: 11.5 FL (ref 6–12)
POTASSIUM SERPL-SCNC: 4.7 MMOL/L (ref 3.5–5.2)
PROTHROMBIN TIME: 25.6 SECONDS (ref 11.7–14.2)
RBC # BLD AUTO: 3.78 10*6/MM3 (ref 4.14–5.8)
SODIUM SERPL-SCNC: 137 MMOL/L (ref 136–145)
WBC NRBC COR # BLD: 4.22 10*3/MM3 (ref 3.4–10.8)

## 2022-05-02 PROCEDURE — 97535 SELF CARE MNGMENT TRAINING: CPT

## 2022-05-02 PROCEDURE — 97530 THERAPEUTIC ACTIVITIES: CPT

## 2022-05-02 PROCEDURE — 85610 PROTHROMBIN TIME: CPT | Performed by: PHYSICAL MEDICINE & REHABILITATION

## 2022-05-02 PROCEDURE — 63710000001 INSULIN LISPRO (HUMAN) PER 5 UNITS: Performed by: PHYSICAL MEDICINE & REHABILITATION

## 2022-05-02 PROCEDURE — 63710000001 DIPHENHYDRAMINE PER 50 MG: Performed by: PHYSICAL MEDICINE & REHABILITATION

## 2022-05-02 PROCEDURE — 85027 COMPLETE CBC AUTOMATED: CPT | Performed by: PHYSICAL MEDICINE & REHABILITATION

## 2022-05-02 PROCEDURE — 97110 THERAPEUTIC EXERCISES: CPT

## 2022-05-02 PROCEDURE — 80048 BASIC METABOLIC PNL TOTAL CA: CPT | Performed by: PHYSICAL MEDICINE & REHABILITATION

## 2022-05-02 PROCEDURE — 97116 GAIT TRAINING THERAPY: CPT

## 2022-05-02 PROCEDURE — 82962 GLUCOSE BLOOD TEST: CPT

## 2022-05-02 RX ORDER — BETAMETHASONE DIPROPIONATE 0.05 %
1 OINTMENT (GRAM) TOPICAL EVERY 12 HOURS SCHEDULED
Status: DISCONTINUED | OUTPATIENT
Start: 2022-05-03 | End: 2022-05-18 | Stop reason: HOSPADM

## 2022-05-02 RX ORDER — WARFARIN SODIUM 10 MG/1
10 TABLET ORAL
Status: COMPLETED | OUTPATIENT
Start: 2022-05-02 | End: 2022-05-02

## 2022-05-02 RX ORDER — WARFARIN SODIUM 5 MG/1
TABLET ORAL
Qty: 115 TABLET | Refills: 1 | OUTPATIENT
Start: 2022-05-02

## 2022-05-02 RX ADMIN — FERROUS SULFATE TAB 325 MG (65 MG ELEMENTAL FE) 325 MG: 325 (65 FE) TAB at 09:54

## 2022-05-02 RX ADMIN — MAGNESIUM OXIDE 400 MG (241.3 MG MAGNESIUM) TABLET 400 MG: TABLET at 09:54

## 2022-05-02 RX ADMIN — INSULIN LISPRO 2 UNITS: 100 INJECTION, SOLUTION INTRAVENOUS; SUBCUTANEOUS at 11:53

## 2022-05-02 RX ADMIN — MAGNESIUM OXIDE 400 MG (241.3 MG MAGNESIUM) TABLET 400 MG: TABLET at 21:51

## 2022-05-02 RX ADMIN — ATORVASTATIN CALCIUM 40 MG: 20 TABLET, FILM COATED ORAL at 09:54

## 2022-05-02 RX ADMIN — BETAMETHASONE DIPROPIONATE 1 APPLICATION: 0.5 OINTMENT TOPICAL at 09:54

## 2022-05-02 RX ADMIN — NYSTATIN: 100000 POWDER TOPICAL at 21:55

## 2022-05-02 RX ADMIN — INSULIN LISPRO 1 UNITS: 100 INJECTION, SOLUTION INTRAVENOUS; SUBCUTANEOUS at 16:27

## 2022-05-02 RX ADMIN — DIGOXIN 62.5 MCG: 125 TABLET ORAL at 11:53

## 2022-05-02 RX ADMIN — WARFARIN 10 MG: 10 TABLET ORAL at 16:28

## 2022-05-02 RX ADMIN — Medication 1 CAPSULE: at 09:54

## 2022-05-02 RX ADMIN — FAMOTIDINE 20 MG: 20 TABLET ORAL at 09:54

## 2022-05-02 RX ADMIN — BETAMETHASONE DIPROPIONATE 1 APPLICATION: 0.5 OINTMENT TOPICAL at 21:54

## 2022-05-02 RX ADMIN — NYSTATIN: 100000 POWDER TOPICAL at 09:54

## 2022-05-02 RX ADMIN — DIPHENHYDRAMINE HYDROCHLORIDE 25 MG: 25 CAPSULE ORAL at 10:04

## 2022-05-02 RX ADMIN — METOPROLOL SUCCINATE 12.5 MG: 25 TABLET, EXTENDED RELEASE ORAL at 09:54

## 2022-05-03 LAB
GLUCOSE BLDC GLUCOMTR-MCNC: 125 MG/DL (ref 70–130)
GLUCOSE BLDC GLUCOMTR-MCNC: 134 MG/DL (ref 70–130)
GLUCOSE BLDC GLUCOMTR-MCNC: 159 MG/DL (ref 70–130)
GLUCOSE BLDC GLUCOMTR-MCNC: 175 MG/DL (ref 70–130)
INR PPP: 2.55 (ref 0.9–1.1)
PROTHROMBIN TIME: 27.6 SECONDS (ref 11.7–14.2)

## 2022-05-03 PROCEDURE — 82962 GLUCOSE BLOOD TEST: CPT

## 2022-05-03 PROCEDURE — 85610 PROTHROMBIN TIME: CPT | Performed by: PHYSICAL MEDICINE & REHABILITATION

## 2022-05-03 PROCEDURE — 97110 THERAPEUTIC EXERCISES: CPT

## 2022-05-03 PROCEDURE — 97530 THERAPEUTIC ACTIVITIES: CPT

## 2022-05-03 PROCEDURE — 97535 SELF CARE MNGMENT TRAINING: CPT

## 2022-05-03 RX ORDER — WARFARIN SODIUM 6 MG/1
6 TABLET ORAL
Status: COMPLETED | OUTPATIENT
Start: 2022-05-03 | End: 2022-05-03

## 2022-05-03 RX ADMIN — NYSTATIN 1 APPLICATION: 100000 POWDER TOPICAL at 21:29

## 2022-05-03 RX ADMIN — Medication 1 CAPSULE: at 07:27

## 2022-05-03 RX ADMIN — DIGOXIN 62.5 MCG: 125 TABLET ORAL at 13:10

## 2022-05-03 RX ADMIN — BETAMETHASONE DIPROPIONATE 1 APPLICATION: 0.5 OINTMENT TOPICAL at 07:29

## 2022-05-03 RX ADMIN — ATORVASTATIN CALCIUM 40 MG: 20 TABLET, FILM COATED ORAL at 07:27

## 2022-05-03 RX ADMIN — FAMOTIDINE 20 MG: 20 TABLET ORAL at 07:28

## 2022-05-03 RX ADMIN — WARFARIN 6 MG: 6 TABLET ORAL at 21:29

## 2022-05-03 RX ADMIN — MAGNESIUM OXIDE 400 MG (241.3 MG MAGNESIUM) TABLET 400 MG: TABLET at 21:29

## 2022-05-03 RX ADMIN — METOPROLOL SUCCINATE 12.5 MG: 25 TABLET, EXTENDED RELEASE ORAL at 07:28

## 2022-05-03 RX ADMIN — MAGNESIUM OXIDE 400 MG (241.3 MG MAGNESIUM) TABLET 400 MG: TABLET at 07:27

## 2022-05-03 RX ADMIN — BETAMETHASONE DIPROPIONATE 1 APPLICATION: 0.5 OINTMENT TOPICAL at 21:29

## 2022-05-03 RX ADMIN — DAKIN'S SOLUTION 0.125% (QUARTER STRENGTH) 946 ML: 0.12 SOLUTION at 07:29

## 2022-05-03 RX ADMIN — DAKIN'S SOLUTION 0.125% (QUARTER STRENGTH) 946 ML: 0.12 SOLUTION at 21:29

## 2022-05-03 RX ADMIN — NYSTATIN: 100000 POWDER TOPICAL at 07:29

## 2022-05-04 LAB
GLUCOSE BLDC GLUCOMTR-MCNC: 111 MG/DL (ref 70–130)
GLUCOSE BLDC GLUCOMTR-MCNC: 132 MG/DL (ref 70–130)
GLUCOSE BLDC GLUCOMTR-MCNC: 153 MG/DL (ref 70–130)
GLUCOSE BLDC GLUCOMTR-MCNC: 214 MG/DL (ref 70–130)
INR PPP: 2.92 (ref 0.9–1.1)
PROTHROMBIN TIME: 30.6 SECONDS (ref 11.7–14.2)

## 2022-05-04 PROCEDURE — 97110 THERAPEUTIC EXERCISES: CPT

## 2022-05-04 PROCEDURE — 97535 SELF CARE MNGMENT TRAINING: CPT

## 2022-05-04 PROCEDURE — 82962 GLUCOSE BLOOD TEST: CPT

## 2022-05-04 PROCEDURE — 85610 PROTHROMBIN TIME: CPT | Performed by: PHYSICAL MEDICINE & REHABILITATION

## 2022-05-04 PROCEDURE — 97530 THERAPEUTIC ACTIVITIES: CPT

## 2022-05-04 RX ORDER — WARFARIN SODIUM 5 MG/1
5 TABLET ORAL
Status: COMPLETED | OUTPATIENT
Start: 2022-05-04 | End: 2022-05-04

## 2022-05-04 RX ADMIN — NYSTATIN: 100000 POWDER TOPICAL at 08:39

## 2022-05-04 RX ADMIN — BETAMETHASONE DIPROPIONATE 1 APPLICATION: 0.5 OINTMENT TOPICAL at 08:39

## 2022-05-04 RX ADMIN — FAMOTIDINE 20 MG: 20 TABLET ORAL at 08:40

## 2022-05-04 RX ADMIN — DAKIN'S SOLUTION 0.125% (QUARTER STRENGTH) 946 ML: 0.12 SOLUTION at 19:44

## 2022-05-04 RX ADMIN — DAKIN'S SOLUTION 0.125% (QUARTER STRENGTH) 946 ML: 0.12 SOLUTION at 08:39

## 2022-05-04 RX ADMIN — MAGNESIUM OXIDE 400 MG (241.3 MG MAGNESIUM) TABLET 400 MG: TABLET at 19:43

## 2022-05-04 RX ADMIN — BETAMETHASONE DIPROPIONATE 1 APPLICATION: 0.5 OINTMENT TOPICAL at 19:42

## 2022-05-04 RX ADMIN — ATORVASTATIN CALCIUM 40 MG: 20 TABLET, FILM COATED ORAL at 08:40

## 2022-05-04 RX ADMIN — WARFARIN SODIUM 5 MG: 5 TABLET ORAL at 17:35

## 2022-05-04 RX ADMIN — NYSTATIN 1 APPLICATION: 100000 POWDER TOPICAL at 19:42

## 2022-05-04 RX ADMIN — MAGNESIUM OXIDE 400 MG (241.3 MG MAGNESIUM) TABLET 400 MG: TABLET at 08:39

## 2022-05-04 RX ADMIN — METOPROLOL SUCCINATE 12.5 MG: 25 TABLET, EXTENDED RELEASE ORAL at 08:39

## 2022-05-04 RX ADMIN — DIGOXIN 62.5 MCG: 125 TABLET ORAL at 13:59

## 2022-05-04 RX ADMIN — FERROUS SULFATE TAB 325 MG (65 MG ELEMENTAL FE) 325 MG: 325 (65 FE) TAB at 08:39

## 2022-05-05 LAB
GLUCOSE BLDC GLUCOMTR-MCNC: 118 MG/DL (ref 70–130)
GLUCOSE BLDC GLUCOMTR-MCNC: 140 MG/DL (ref 70–130)
GLUCOSE BLDC GLUCOMTR-MCNC: 143 MG/DL (ref 70–130)
GLUCOSE BLDC GLUCOMTR-MCNC: 168 MG/DL (ref 70–130)
INR PPP: 3.22 (ref 0.9–1.1)
PROTHROMBIN TIME: 33.2 SECONDS (ref 11.7–14.2)

## 2022-05-05 PROCEDURE — 97535 SELF CARE MNGMENT TRAINING: CPT

## 2022-05-05 PROCEDURE — 97530 THERAPEUTIC ACTIVITIES: CPT

## 2022-05-05 PROCEDURE — 85610 PROTHROMBIN TIME: CPT | Performed by: PHYSICAL MEDICINE & REHABILITATION

## 2022-05-05 PROCEDURE — 97110 THERAPEUTIC EXERCISES: CPT

## 2022-05-05 PROCEDURE — 91305 COVID-19 MRNA VAC-TRIS(PFIZER) 30 MCG/0.3ML SUSPENSION: CPT | Performed by: PHYSICAL MEDICINE & REHABILITATION

## 2022-05-05 PROCEDURE — 0053A HC ADM SARSCV2 30MCG TRS-SUCR 3RD DOSE: CPT | Performed by: PHYSICAL MEDICINE & REHABILITATION

## 2022-05-05 PROCEDURE — 0054A HC ADM SARSCV2 30MCG TRS-SUCR BOOSTER: CPT | Performed by: PHYSICAL MEDICINE & REHABILITATION

## 2022-05-05 PROCEDURE — 0051A HC ADM SARSCV2 30MCG TRS-SUCR 1ST DOSE: CPT | Performed by: PHYSICAL MEDICINE & REHABILITATION

## 2022-05-05 PROCEDURE — 0052A HC ADM SARSCV2 30MCG TRS-SUCR 2ND DOSE: CPT | Performed by: PHYSICAL MEDICINE & REHABILITATION

## 2022-05-05 PROCEDURE — 82962 GLUCOSE BLOOD TEST: CPT

## 2022-05-05 PROCEDURE — 25010000002 COVID-19 MRNA VAC-TRIS(PFIZER) 30 MCG/0.3ML SUSPENSION: Performed by: PHYSICAL MEDICINE & REHABILITATION

## 2022-05-05 RX ORDER — DIPHENHYDRAMINE HCL 25 MG
50 CAPSULE ORAL ONCE AS NEEDED
Status: DISCONTINUED | OUTPATIENT
Start: 2022-05-05 | End: 2022-05-09

## 2022-05-05 RX ORDER — WARFARIN SODIUM 5 MG/1
5 TABLET ORAL
Status: COMPLETED | OUTPATIENT
Start: 2022-05-05 | End: 2022-05-05

## 2022-05-05 RX ADMIN — BETAMETHASONE DIPROPIONATE 1 APPLICATION: 0.5 OINTMENT TOPICAL at 08:39

## 2022-05-05 RX ADMIN — NYSTATIN: 100000 POWDER TOPICAL at 08:39

## 2022-05-05 RX ADMIN — BNT162B2 0.3 ML: 0.23 INJECTION, SUSPENSION INTRAMUSCULAR at 17:00

## 2022-05-05 RX ADMIN — METOPROLOL SUCCINATE 12.5 MG: 25 TABLET, EXTENDED RELEASE ORAL at 08:38

## 2022-05-05 RX ADMIN — MAGNESIUM OXIDE 400 MG (241.3 MG MAGNESIUM) TABLET 400 MG: TABLET at 08:38

## 2022-05-05 RX ADMIN — DAKIN'S SOLUTION 0.125% (QUARTER STRENGTH) 946 ML: 0.12 SOLUTION at 19:45

## 2022-05-05 RX ADMIN — WARFARIN SODIUM 5 MG: 5 TABLET ORAL at 16:52

## 2022-05-05 RX ADMIN — FAMOTIDINE 20 MG: 20 TABLET ORAL at 08:38

## 2022-05-05 RX ADMIN — BETAMETHASONE DIPROPIONATE 1 APPLICATION: 0.5 OINTMENT TOPICAL at 19:45

## 2022-05-05 RX ADMIN — ATORVASTATIN CALCIUM 40 MG: 20 TABLET, FILM COATED ORAL at 08:38

## 2022-05-05 RX ADMIN — MAGNESIUM OXIDE 400 MG (241.3 MG MAGNESIUM) TABLET 400 MG: TABLET at 19:45

## 2022-05-05 RX ADMIN — NYSTATIN: 100000 POWDER TOPICAL at 19:46

## 2022-05-05 RX ADMIN — DAKIN'S SOLUTION 0.125% (QUARTER STRENGTH) 946 ML: 0.12 SOLUTION at 08:39

## 2022-05-05 RX ADMIN — DIGOXIN 62.5 MCG: 125 TABLET ORAL at 12:30

## 2022-05-06 LAB
ANION GAP SERPL CALCULATED.3IONS-SCNC: 7.4 MMOL/L (ref 5–15)
BUN SERPL-MCNC: 23 MG/DL (ref 8–23)
BUN/CREAT SERPL: 22.5 (ref 7–25)
CALCIUM SPEC-SCNC: 8.7 MG/DL (ref 8.6–10.5)
CHLORIDE SERPL-SCNC: 109 MMOL/L (ref 98–107)
CO2 SERPL-SCNC: 26.6 MMOL/L (ref 22–29)
CREAT SERPL-MCNC: 1.02 MG/DL (ref 0.76–1.27)
DEPRECATED RDW RBC AUTO: 48.4 FL (ref 37–54)
EGFRCR SERPLBLD CKD-EPI 2021: 72.5 ML/MIN/1.73
ERYTHROCYTE [DISTWIDTH] IN BLOOD BY AUTOMATED COUNT: 15.1 % (ref 12.3–15.4)
GLUCOSE BLDC GLUCOMTR-MCNC: 109 MG/DL (ref 70–130)
GLUCOSE BLDC GLUCOMTR-MCNC: 139 MG/DL (ref 70–130)
GLUCOSE BLDC GLUCOMTR-MCNC: 193 MG/DL (ref 70–130)
GLUCOSE BLDC GLUCOMTR-MCNC: 215 MG/DL (ref 70–130)
GLUCOSE SERPL-MCNC: 159 MG/DL (ref 65–99)
HCT VFR BLD AUTO: 34.4 % (ref 37.5–51)
HGB BLD-MCNC: 10.2 G/DL (ref 13–17.7)
INR PPP: 3.28 (ref 0.9–1.1)
MCH RBC QN AUTO: 25.8 PG (ref 26.6–33)
MCHC RBC AUTO-ENTMCNC: 29.7 G/DL (ref 31.5–35.7)
MCV RBC AUTO: 86.9 FL (ref 79–97)
PLATELET # BLD AUTO: 180 10*3/MM3 (ref 140–450)
PMV BLD AUTO: 11.3 FL (ref 6–12)
POTASSIUM SERPL-SCNC: 4.8 MMOL/L (ref 3.5–5.2)
PROTHROMBIN TIME: 33.6 SECONDS (ref 11.7–14.2)
RBC # BLD AUTO: 3.96 10*6/MM3 (ref 4.14–5.8)
SODIUM SERPL-SCNC: 143 MMOL/L (ref 136–145)
WBC NRBC COR # BLD: 4.55 10*3/MM3 (ref 3.4–10.8)

## 2022-05-06 PROCEDURE — 80048 BASIC METABOLIC PNL TOTAL CA: CPT | Performed by: PHYSICAL MEDICINE & REHABILITATION

## 2022-05-06 PROCEDURE — 82962 GLUCOSE BLOOD TEST: CPT

## 2022-05-06 PROCEDURE — 97110 THERAPEUTIC EXERCISES: CPT

## 2022-05-06 PROCEDURE — 63710000001 INSULIN LISPRO (HUMAN) PER 5 UNITS: Performed by: PHYSICAL MEDICINE & REHABILITATION

## 2022-05-06 PROCEDURE — 85610 PROTHROMBIN TIME: CPT | Performed by: PHYSICAL MEDICINE & REHABILITATION

## 2022-05-06 PROCEDURE — 85027 COMPLETE CBC AUTOMATED: CPT | Performed by: PHYSICAL MEDICINE & REHABILITATION

## 2022-05-06 PROCEDURE — 97530 THERAPEUTIC ACTIVITIES: CPT

## 2022-05-06 PROCEDURE — 97110 THERAPEUTIC EXERCISES: CPT | Performed by: OCCUPATIONAL THERAPIST

## 2022-05-06 PROCEDURE — 97112 NEUROMUSCULAR REEDUCATION: CPT | Performed by: OCCUPATIONAL THERAPIST

## 2022-05-06 RX ORDER — ATORVASTATIN CALCIUM 40 MG/1
TABLET, FILM COATED ORAL
Qty: 90 TABLET | Refills: 1 | Status: SHIPPED | OUTPATIENT
Start: 2022-05-06 | End: 2022-10-31

## 2022-05-06 RX ORDER — WARFARIN SODIUM 5 MG/1
5 TABLET ORAL
Status: COMPLETED | OUTPATIENT
Start: 2022-05-06 | End: 2022-05-06

## 2022-05-06 RX ADMIN — METOPROLOL SUCCINATE 12.5 MG: 25 TABLET, EXTENDED RELEASE ORAL at 08:25

## 2022-05-06 RX ADMIN — NYSTATIN: 100000 POWDER TOPICAL at 21:11

## 2022-05-06 RX ADMIN — BETAMETHASONE DIPROPIONATE 1 APPLICATION: 0.5 OINTMENT TOPICAL at 08:25

## 2022-05-06 RX ADMIN — MAGNESIUM OXIDE 400 MG (241.3 MG MAGNESIUM) TABLET 400 MG: TABLET at 21:08

## 2022-05-06 RX ADMIN — DAKIN'S SOLUTION 0.125% (QUARTER STRENGTH) 946 ML: 0.12 SOLUTION at 08:25

## 2022-05-06 RX ADMIN — FAMOTIDINE 20 MG: 20 TABLET ORAL at 08:25

## 2022-05-06 RX ADMIN — INSULIN LISPRO 2 UNITS: 100 INJECTION, SOLUTION INTRAVENOUS; SUBCUTANEOUS at 11:57

## 2022-05-06 RX ADMIN — DIGOXIN 62.5 MCG: 125 TABLET ORAL at 11:59

## 2022-05-06 RX ADMIN — WARFARIN SODIUM 5 MG: 5 TABLET ORAL at 17:54

## 2022-05-06 RX ADMIN — ATORVASTATIN CALCIUM 40 MG: 20 TABLET, FILM COATED ORAL at 08:25

## 2022-05-06 RX ADMIN — MAGNESIUM OXIDE 400 MG (241.3 MG MAGNESIUM) TABLET 400 MG: TABLET at 08:24

## 2022-05-06 RX ADMIN — NYSTATIN: 100000 POWDER TOPICAL at 08:26

## 2022-05-06 RX ADMIN — FERROUS SULFATE TAB 325 MG (65 MG ELEMENTAL FE) 325 MG: 325 (65 FE) TAB at 08:24

## 2022-05-07 LAB
GLUCOSE BLDC GLUCOMTR-MCNC: 109 MG/DL (ref 70–130)
GLUCOSE BLDC GLUCOMTR-MCNC: 181 MG/DL (ref 70–130)
GLUCOSE BLDC GLUCOMTR-MCNC: 199 MG/DL (ref 70–130)
GLUCOSE BLDC GLUCOMTR-MCNC: 208 MG/DL (ref 70–130)
INR PPP: 3.2 (ref 0.9–1.1)
PROTHROMBIN TIME: 33 SECONDS (ref 11.7–14.2)

## 2022-05-07 PROCEDURE — 63710000001 INSULIN LISPRO (HUMAN) PER 5 UNITS: Performed by: PHYSICAL MEDICINE & REHABILITATION

## 2022-05-07 PROCEDURE — 82962 GLUCOSE BLOOD TEST: CPT

## 2022-05-07 PROCEDURE — 97530 THERAPEUTIC ACTIVITIES: CPT

## 2022-05-07 PROCEDURE — 97110 THERAPEUTIC EXERCISES: CPT

## 2022-05-07 PROCEDURE — 85610 PROTHROMBIN TIME: CPT | Performed by: PHYSICAL MEDICINE & REHABILITATION

## 2022-05-07 RX ORDER — WARFARIN SODIUM 7.5 MG/1
7.5 TABLET ORAL
Status: DISCONTINUED | OUTPATIENT
Start: 2022-05-09 | End: 2022-05-13

## 2022-05-07 RX ORDER — WARFARIN SODIUM 5 MG/1
5 TABLET ORAL
Status: DISCONTINUED | OUTPATIENT
Start: 2022-05-07 | End: 2022-05-13

## 2022-05-07 RX ADMIN — DAKIN'S SOLUTION 0.125% (QUARTER STRENGTH) 946 ML: 0.12 SOLUTION at 20:09

## 2022-05-07 RX ADMIN — DIGOXIN 62.5 MCG: 125 TABLET ORAL at 11:39

## 2022-05-07 RX ADMIN — MAGNESIUM OXIDE 400 MG (241.3 MG MAGNESIUM) TABLET 400 MG: TABLET at 08:50

## 2022-05-07 RX ADMIN — INSULIN LISPRO 1 UNITS: 100 INJECTION, SOLUTION INTRAVENOUS; SUBCUTANEOUS at 11:38

## 2022-05-07 RX ADMIN — WARFARIN 5 MG: 2.5 TABLET ORAL at 18:25

## 2022-05-07 RX ADMIN — METOPROLOL SUCCINATE 12.5 MG: 25 TABLET, EXTENDED RELEASE ORAL at 08:50

## 2022-05-07 RX ADMIN — FAMOTIDINE 20 MG: 20 TABLET ORAL at 08:50

## 2022-05-07 RX ADMIN — ATORVASTATIN CALCIUM 40 MG: 20 TABLET, FILM COATED ORAL at 08:50

## 2022-05-07 RX ADMIN — BETAMETHASONE DIPROPIONATE 1 APPLICATION: 0.5 OINTMENT TOPICAL at 20:09

## 2022-05-07 RX ADMIN — INSULIN LISPRO 1 UNITS: 100 INJECTION, SOLUTION INTRAVENOUS; SUBCUTANEOUS at 17:53

## 2022-05-07 RX ADMIN — MAGNESIUM OXIDE 400 MG (241.3 MG MAGNESIUM) TABLET 400 MG: TABLET at 20:09

## 2022-05-07 RX ADMIN — NYSTATIN: 100000 POWDER TOPICAL at 08:50

## 2022-05-07 RX ADMIN — DAKIN'S SOLUTION 0.125% (QUARTER STRENGTH) 946 ML: 0.12 SOLUTION at 08:54

## 2022-05-07 RX ADMIN — BETAMETHASONE DIPROPIONATE 1 APPLICATION: 0.5 OINTMENT TOPICAL at 08:50

## 2022-05-08 LAB
GLUCOSE BLDC GLUCOMTR-MCNC: 110 MG/DL (ref 70–130)
GLUCOSE BLDC GLUCOMTR-MCNC: 121 MG/DL (ref 70–130)
GLUCOSE BLDC GLUCOMTR-MCNC: 173 MG/DL (ref 70–130)
GLUCOSE BLDC GLUCOMTR-MCNC: 176 MG/DL (ref 70–130)
INR PPP: 3.06 (ref 0.9–1.1)
PROTHROMBIN TIME: 31.8 SECONDS (ref 11.7–14.2)

## 2022-05-08 PROCEDURE — 82962 GLUCOSE BLOOD TEST: CPT

## 2022-05-08 PROCEDURE — 63710000001 INSULIN LISPRO (HUMAN) PER 5 UNITS: Performed by: PHYSICAL MEDICINE & REHABILITATION

## 2022-05-08 PROCEDURE — 85610 PROTHROMBIN TIME: CPT | Performed by: PHYSICAL MEDICINE & REHABILITATION

## 2022-05-08 RX ADMIN — BETAMETHASONE DIPROPIONATE 1 APPLICATION: 0.5 OINTMENT TOPICAL at 07:47

## 2022-05-08 RX ADMIN — DAKIN'S SOLUTION 0.125% (QUARTER STRENGTH) 946 ML: 0.12 SOLUTION at 07:46

## 2022-05-08 RX ADMIN — NYSTATIN: 100000 POWDER TOPICAL at 07:48

## 2022-05-08 RX ADMIN — FAMOTIDINE 20 MG: 20 TABLET ORAL at 07:45

## 2022-05-08 RX ADMIN — ATORVASTATIN CALCIUM 40 MG: 20 TABLET, FILM COATED ORAL at 07:45

## 2022-05-08 RX ADMIN — FERROUS SULFATE TAB 325 MG (65 MG ELEMENTAL FE) 325 MG: 325 (65 FE) TAB at 07:45

## 2022-05-08 RX ADMIN — MAGNESIUM OXIDE 400 MG (241.3 MG MAGNESIUM) TABLET 400 MG: TABLET at 21:40

## 2022-05-08 RX ADMIN — BETAMETHASONE DIPROPIONATE 1 APPLICATION: 0.5 OINTMENT TOPICAL at 21:41

## 2022-05-08 RX ADMIN — NYSTATIN 1 APPLICATION: 100000 POWDER TOPICAL at 21:40

## 2022-05-08 RX ADMIN — WARFARIN 5 MG: 2.5 TABLET ORAL at 17:24

## 2022-05-08 RX ADMIN — INSULIN LISPRO 2 UNITS: 100 INJECTION, SOLUTION INTRAVENOUS; SUBCUTANEOUS at 17:24

## 2022-05-08 RX ADMIN — DAKIN'S SOLUTION 0.125% (QUARTER STRENGTH) 946 ML: 0.12 SOLUTION at 21:40

## 2022-05-08 RX ADMIN — MAGNESIUM OXIDE 400 MG (241.3 MG MAGNESIUM) TABLET 400 MG: TABLET at 07:45

## 2022-05-08 RX ADMIN — DIGOXIN 62.5 MCG: 125 TABLET ORAL at 12:48

## 2022-05-08 RX ADMIN — METOPROLOL SUCCINATE 12.5 MG: 25 TABLET, EXTENDED RELEASE ORAL at 07:46

## 2022-05-09 LAB
ANION GAP SERPL CALCULATED.3IONS-SCNC: 9.3 MMOL/L (ref 5–15)
BUN SERPL-MCNC: 27 MG/DL (ref 8–23)
BUN/CREAT SERPL: 24.8 (ref 7–25)
CALCIUM SPEC-SCNC: 8.7 MG/DL (ref 8.6–10.5)
CHLORIDE SERPL-SCNC: 107 MMOL/L (ref 98–107)
CO2 SERPL-SCNC: 23.7 MMOL/L (ref 22–29)
CREAT SERPL-MCNC: 1.09 MG/DL (ref 0.76–1.27)
DEPRECATED RDW RBC AUTO: 46.4 FL (ref 37–54)
EGFRCR SERPLBLD CKD-EPI 2021: 66.9 ML/MIN/1.73
ERYTHROCYTE [DISTWIDTH] IN BLOOD BY AUTOMATED COUNT: 14.9 % (ref 12.3–15.4)
GLUCOSE BLDC GLUCOMTR-MCNC: 108 MG/DL (ref 70–130)
GLUCOSE BLDC GLUCOMTR-MCNC: 151 MG/DL (ref 70–130)
GLUCOSE BLDC GLUCOMTR-MCNC: 181 MG/DL (ref 70–130)
GLUCOSE BLDC GLUCOMTR-MCNC: 213 MG/DL (ref 70–130)
GLUCOSE SERPL-MCNC: 106 MG/DL (ref 65–99)
HCT VFR BLD AUTO: 33 % (ref 37.5–51)
HGB BLD-MCNC: 9.9 G/DL (ref 13–17.7)
INR PPP: 2.81 (ref 0.9–1.1)
MCH RBC QN AUTO: 25.6 PG (ref 26.6–33)
MCHC RBC AUTO-ENTMCNC: 30 G/DL (ref 31.5–35.7)
MCV RBC AUTO: 85.5 FL (ref 79–97)
PLATELET # BLD AUTO: 167 10*3/MM3 (ref 140–450)
PMV BLD AUTO: 11.9 FL (ref 6–12)
POTASSIUM SERPL-SCNC: 4.5 MMOL/L (ref 3.5–5.2)
PROTHROMBIN TIME: 29.7 SECONDS (ref 11.7–14.2)
RBC # BLD AUTO: 3.86 10*6/MM3 (ref 4.14–5.8)
SODIUM SERPL-SCNC: 140 MMOL/L (ref 136–145)
WBC NRBC COR # BLD: 4.28 10*3/MM3 (ref 3.4–10.8)

## 2022-05-09 PROCEDURE — 85027 COMPLETE CBC AUTOMATED: CPT | Performed by: PHYSICAL MEDICINE & REHABILITATION

## 2022-05-09 PROCEDURE — 82962 GLUCOSE BLOOD TEST: CPT

## 2022-05-09 PROCEDURE — 97535 SELF CARE MNGMENT TRAINING: CPT

## 2022-05-09 PROCEDURE — 85610 PROTHROMBIN TIME: CPT | Performed by: PHYSICAL MEDICINE & REHABILITATION

## 2022-05-09 PROCEDURE — 63710000001 INSULIN LISPRO (HUMAN) PER 5 UNITS: Performed by: PHYSICAL MEDICINE & REHABILITATION

## 2022-05-09 PROCEDURE — 97530 THERAPEUTIC ACTIVITIES: CPT

## 2022-05-09 PROCEDURE — 80048 BASIC METABOLIC PNL TOTAL CA: CPT | Performed by: PHYSICAL MEDICINE & REHABILITATION

## 2022-05-09 PROCEDURE — 97110 THERAPEUTIC EXERCISES: CPT

## 2022-05-09 RX ADMIN — NYSTATIN: 100000 POWDER TOPICAL at 21:13

## 2022-05-09 RX ADMIN — NYSTATIN: 100000 POWDER TOPICAL at 08:11

## 2022-05-09 RX ADMIN — ATORVASTATIN CALCIUM 40 MG: 20 TABLET, FILM COATED ORAL at 08:15

## 2022-05-09 RX ADMIN — MAGNESIUM OXIDE 400 MG (241.3 MG MAGNESIUM) TABLET 400 MG: TABLET at 08:15

## 2022-05-09 RX ADMIN — WARFARIN 7.5 MG: 7.5 TABLET ORAL at 17:40

## 2022-05-09 RX ADMIN — DAKIN'S SOLUTION 0.125% (QUARTER STRENGTH) 946 ML: 0.12 SOLUTION at 08:03

## 2022-05-09 RX ADMIN — FAMOTIDINE 20 MG: 20 TABLET ORAL at 08:15

## 2022-05-09 RX ADMIN — METOPROLOL SUCCINATE 12.5 MG: 25 TABLET, EXTENDED RELEASE ORAL at 08:15

## 2022-05-09 RX ADMIN — MAGNESIUM OXIDE 400 MG (241.3 MG MAGNESIUM) TABLET 400 MG: TABLET at 21:13

## 2022-05-09 RX ADMIN — BETAMETHASONE DIPROPIONATE 1 APPLICATION: 0.5 OINTMENT TOPICAL at 08:11

## 2022-05-09 RX ADMIN — DIGOXIN 62.5 MCG: 125 TABLET ORAL at 11:36

## 2022-05-09 RX ADMIN — INSULIN LISPRO 2 UNITS: 100 INJECTION, SOLUTION INTRAVENOUS; SUBCUTANEOUS at 11:37

## 2022-05-10 LAB
GLUCOSE BLDC GLUCOMTR-MCNC: 135 MG/DL (ref 70–130)
GLUCOSE BLDC GLUCOMTR-MCNC: 154 MG/DL (ref 70–130)
GLUCOSE BLDC GLUCOMTR-MCNC: 172 MG/DL (ref 70–130)
GLUCOSE BLDC GLUCOMTR-MCNC: 196 MG/DL (ref 70–130)
INR PPP: 3.17 (ref 0.9–1.1)
PROTHROMBIN TIME: 32.7 SECONDS (ref 11.7–14.2)

## 2022-05-10 PROCEDURE — 97110 THERAPEUTIC EXERCISES: CPT

## 2022-05-10 PROCEDURE — 82962 GLUCOSE BLOOD TEST: CPT

## 2022-05-10 PROCEDURE — 85610 PROTHROMBIN TIME: CPT | Performed by: PHYSICAL MEDICINE & REHABILITATION

## 2022-05-10 PROCEDURE — 97530 THERAPEUTIC ACTIVITIES: CPT

## 2022-05-10 PROCEDURE — 63710000001 INSULIN LISPRO (HUMAN) PER 5 UNITS: Performed by: PHYSICAL MEDICINE & REHABILITATION

## 2022-05-10 PROCEDURE — 97535 SELF CARE MNGMENT TRAINING: CPT

## 2022-05-10 RX ADMIN — NYSTATIN: 100000 POWDER TOPICAL at 08:14

## 2022-05-10 RX ADMIN — WARFARIN 5 MG: 2.5 TABLET ORAL at 17:36

## 2022-05-10 RX ADMIN — DIGOXIN 62.5 MCG: 125 TABLET ORAL at 12:04

## 2022-05-10 RX ADMIN — BETAMETHASONE DIPROPIONATE 1 APPLICATION: 0.5 OINTMENT TOPICAL at 22:33

## 2022-05-10 RX ADMIN — METOPROLOL SUCCINATE 12.5 MG: 25 TABLET, EXTENDED RELEASE ORAL at 08:14

## 2022-05-10 RX ADMIN — NYSTATIN: 100000 POWDER TOPICAL at 22:34

## 2022-05-10 RX ADMIN — MAGNESIUM OXIDE 400 MG (241.3 MG MAGNESIUM) TABLET 400 MG: TABLET at 08:14

## 2022-05-10 RX ADMIN — BETAMETHASONE DIPROPIONATE 1 APPLICATION: 0.5 OINTMENT TOPICAL at 08:14

## 2022-05-10 RX ADMIN — MAGNESIUM OXIDE 400 MG (241.3 MG MAGNESIUM) TABLET 400 MG: TABLET at 22:33

## 2022-05-10 RX ADMIN — ATORVASTATIN CALCIUM 40 MG: 20 TABLET, FILM COATED ORAL at 08:14

## 2022-05-10 RX ADMIN — DAKIN'S SOLUTION 0.125% (QUARTER STRENGTH) 946 ML: 0.12 SOLUTION at 08:15

## 2022-05-10 RX ADMIN — DAKIN'S SOLUTION 0.125% (QUARTER STRENGTH) 946 ML: 0.12 SOLUTION at 22:33

## 2022-05-10 RX ADMIN — FERROUS SULFATE TAB 325 MG (65 MG ELEMENTAL FE) 325 MG: 325 (65 FE) TAB at 08:14

## 2022-05-10 RX ADMIN — INSULIN LISPRO 2 UNITS: 100 INJECTION, SOLUTION INTRAVENOUS; SUBCUTANEOUS at 16:49

## 2022-05-10 RX ADMIN — FAMOTIDINE 20 MG: 20 TABLET ORAL at 08:14

## 2022-05-11 LAB
GLUCOSE BLDC GLUCOMTR-MCNC: 125 MG/DL (ref 70–130)
GLUCOSE BLDC GLUCOMTR-MCNC: 165 MG/DL (ref 70–130)
GLUCOSE BLDC GLUCOMTR-MCNC: 180 MG/DL (ref 70–130)
GLUCOSE BLDC GLUCOMTR-MCNC: 186 MG/DL (ref 70–130)
INR PPP: 3.02 (ref 0.9–1.1)
MYCOBACTERIUM SPEC CULT: NORMAL
NIGHT BLUE STAIN TISS: NORMAL
PROTHROMBIN TIME: 31.5 SECONDS (ref 11.7–14.2)

## 2022-05-11 PROCEDURE — 97110 THERAPEUTIC EXERCISES: CPT

## 2022-05-11 PROCEDURE — 82962 GLUCOSE BLOOD TEST: CPT

## 2022-05-11 PROCEDURE — 97530 THERAPEUTIC ACTIVITIES: CPT

## 2022-05-11 PROCEDURE — 97535 SELF CARE MNGMENT TRAINING: CPT

## 2022-05-11 PROCEDURE — 85610 PROTHROMBIN TIME: CPT | Performed by: PHYSICAL MEDICINE & REHABILITATION

## 2022-05-11 PROCEDURE — 63710000001 INSULIN LISPRO (HUMAN) PER 5 UNITS: Performed by: PHYSICAL MEDICINE & REHABILITATION

## 2022-05-11 RX ADMIN — NYSTATIN: 100000 POWDER TOPICAL at 08:13

## 2022-05-11 RX ADMIN — METOPROLOL SUCCINATE 12.5 MG: 25 TABLET, EXTENDED RELEASE ORAL at 08:13

## 2022-05-11 RX ADMIN — INSULIN LISPRO 2 UNITS: 100 INJECTION, SOLUTION INTRAVENOUS; SUBCUTANEOUS at 11:48

## 2022-05-11 RX ADMIN — DAKIN'S SOLUTION 0.125% (QUARTER STRENGTH) 946 ML: 0.12 SOLUTION at 21:39

## 2022-05-11 RX ADMIN — ATORVASTATIN CALCIUM 40 MG: 20 TABLET, FILM COATED ORAL at 08:12

## 2022-05-11 RX ADMIN — INSULIN LISPRO 2 UNITS: 100 INJECTION, SOLUTION INTRAVENOUS; SUBCUTANEOUS at 17:02

## 2022-05-11 RX ADMIN — BETAMETHASONE DIPROPIONATE 1 APPLICATION: 0.5 OINTMENT TOPICAL at 08:13

## 2022-05-11 RX ADMIN — WARFARIN 5 MG: 2.5 TABLET ORAL at 17:02

## 2022-05-11 RX ADMIN — BETAMETHASONE DIPROPIONATE 1 APPLICATION: 0.5 OINTMENT TOPICAL at 21:39

## 2022-05-11 RX ADMIN — MAGNESIUM OXIDE 400 MG (241.3 MG MAGNESIUM) TABLET 400 MG: TABLET at 08:12

## 2022-05-11 RX ADMIN — DAKIN'S SOLUTION 0.125% (QUARTER STRENGTH) 946 ML: 0.12 SOLUTION at 08:13

## 2022-05-11 RX ADMIN — FAMOTIDINE 20 MG: 20 TABLET ORAL at 08:13

## 2022-05-11 RX ADMIN — NYSTATIN 1 APPLICATION: 100000 POWDER TOPICAL at 21:39

## 2022-05-11 RX ADMIN — MAGNESIUM OXIDE 400 MG (241.3 MG MAGNESIUM) TABLET 400 MG: TABLET at 21:39

## 2022-05-11 RX ADMIN — DIGOXIN 62.5 MCG: 125 TABLET ORAL at 11:48

## 2022-05-12 LAB
GLUCOSE BLDC GLUCOMTR-MCNC: 125 MG/DL (ref 70–130)
GLUCOSE BLDC GLUCOMTR-MCNC: 136 MG/DL (ref 70–130)
GLUCOSE BLDC GLUCOMTR-MCNC: 208 MG/DL (ref 70–130)
GLUCOSE BLDC GLUCOMTR-MCNC: 218 MG/DL (ref 70–130)

## 2022-05-12 PROCEDURE — 97110 THERAPEUTIC EXERCISES: CPT

## 2022-05-12 PROCEDURE — 97535 SELF CARE MNGMENT TRAINING: CPT

## 2022-05-12 PROCEDURE — 97530 THERAPEUTIC ACTIVITIES: CPT

## 2022-05-12 PROCEDURE — 82962 GLUCOSE BLOOD TEST: CPT

## 2022-05-12 PROCEDURE — 63710000001 INSULIN LISPRO (HUMAN) PER 5 UNITS: Performed by: PHYSICAL MEDICINE & REHABILITATION

## 2022-05-12 RX ADMIN — METOPROLOL SUCCINATE 12.5 MG: 25 TABLET, EXTENDED RELEASE ORAL at 08:32

## 2022-05-12 RX ADMIN — NYSTATIN: 100000 POWDER TOPICAL at 08:34

## 2022-05-12 RX ADMIN — BETAMETHASONE DIPROPIONATE 1 APPLICATION: 0.5 OINTMENT TOPICAL at 19:37

## 2022-05-12 RX ADMIN — MAGNESIUM OXIDE 400 MG (241.3 MG MAGNESIUM) TABLET 400 MG: TABLET at 08:32

## 2022-05-12 RX ADMIN — MAGNESIUM OXIDE 400 MG (241.3 MG MAGNESIUM) TABLET 400 MG: TABLET at 19:37

## 2022-05-12 RX ADMIN — DIGOXIN 62.5 MCG: 125 TABLET ORAL at 11:54

## 2022-05-12 RX ADMIN — WARFARIN 5 MG: 2.5 TABLET ORAL at 17:58

## 2022-05-12 RX ADMIN — FERROUS SULFATE TAB 325 MG (65 MG ELEMENTAL FE) 325 MG: 325 (65 FE) TAB at 08:32

## 2022-05-12 RX ADMIN — DAKIN'S SOLUTION 0.125% (QUARTER STRENGTH) 946 ML: 0.12 SOLUTION at 08:32

## 2022-05-12 RX ADMIN — BETAMETHASONE DIPROPIONATE 1 APPLICATION: 0.5 OINTMENT TOPICAL at 08:32

## 2022-05-12 RX ADMIN — NYSTATIN: 100000 POWDER TOPICAL at 19:37

## 2022-05-12 RX ADMIN — DAKIN'S SOLUTION 0.125% (QUARTER STRENGTH) 946 ML: 0.12 SOLUTION at 19:37

## 2022-05-12 RX ADMIN — FAMOTIDINE 20 MG: 20 TABLET ORAL at 08:32

## 2022-05-12 RX ADMIN — INSULIN LISPRO 2 UNITS: 100 INJECTION, SOLUTION INTRAVENOUS; SUBCUTANEOUS at 11:53

## 2022-05-12 RX ADMIN — ATORVASTATIN CALCIUM 40 MG: 20 TABLET, FILM COATED ORAL at 08:32

## 2022-05-13 LAB
ANION GAP SERPL CALCULATED.3IONS-SCNC: 11 MMOL/L (ref 5–15)
BUN SERPL-MCNC: 28 MG/DL (ref 8–23)
BUN/CREAT SERPL: 27.7 (ref 7–25)
CALCIUM SPEC-SCNC: 8.6 MG/DL (ref 8.6–10.5)
CHLORIDE SERPL-SCNC: 104 MMOL/L (ref 98–107)
CO2 SERPL-SCNC: 25 MMOL/L (ref 22–29)
CREAT SERPL-MCNC: 1.01 MG/DL (ref 0.76–1.27)
DEPRECATED RDW RBC AUTO: 44.2 FL (ref 37–54)
EGFRCR SERPLBLD CKD-EPI 2021: 73.3 ML/MIN/1.73
ERYTHROCYTE [DISTWIDTH] IN BLOOD BY AUTOMATED COUNT: 14.7 % (ref 12.3–15.4)
GLUCOSE BLDC GLUCOMTR-MCNC: 101 MG/DL (ref 70–130)
GLUCOSE BLDC GLUCOMTR-MCNC: 120 MG/DL (ref 70–130)
GLUCOSE BLDC GLUCOMTR-MCNC: 166 MG/DL (ref 70–130)
GLUCOSE BLDC GLUCOMTR-MCNC: 205 MG/DL (ref 70–130)
GLUCOSE SERPL-MCNC: 186 MG/DL (ref 65–99)
HCT VFR BLD AUTO: 32.4 % (ref 37.5–51)
HGB BLD-MCNC: 10.3 G/DL (ref 13–17.7)
INR PPP: 2.81 (ref 0.9–1.1)
MCH RBC QN AUTO: 26.1 PG (ref 26.6–33)
MCHC RBC AUTO-ENTMCNC: 31.8 G/DL (ref 31.5–35.7)
MCV RBC AUTO: 82.2 FL (ref 79–97)
PLATELET # BLD AUTO: 146 10*3/MM3 (ref 140–450)
PMV BLD AUTO: 11.1 FL (ref 6–12)
POTASSIUM SERPL-SCNC: 4.6 MMOL/L (ref 3.5–5.2)
PROTHROMBIN TIME: 29.7 SECONDS (ref 11.7–14.2)
RBC # BLD AUTO: 3.94 10*6/MM3 (ref 4.14–5.8)
SODIUM SERPL-SCNC: 140 MMOL/L (ref 136–145)
WBC NRBC COR # BLD: 3.96 10*3/MM3 (ref 3.4–10.8)

## 2022-05-13 PROCEDURE — 85027 COMPLETE CBC AUTOMATED: CPT | Performed by: PHYSICAL MEDICINE & REHABILITATION

## 2022-05-13 PROCEDURE — 97110 THERAPEUTIC EXERCISES: CPT

## 2022-05-13 PROCEDURE — 97530 THERAPEUTIC ACTIVITIES: CPT

## 2022-05-13 PROCEDURE — 63710000001 INSULIN LISPRO (HUMAN) PER 5 UNITS: Performed by: PHYSICAL MEDICINE & REHABILITATION

## 2022-05-13 PROCEDURE — 80048 BASIC METABOLIC PNL TOTAL CA: CPT | Performed by: PHYSICAL MEDICINE & REHABILITATION

## 2022-05-13 PROCEDURE — 85610 PROTHROMBIN TIME: CPT | Performed by: PHYSICAL MEDICINE & REHABILITATION

## 2022-05-13 PROCEDURE — 82962 GLUCOSE BLOOD TEST: CPT

## 2022-05-13 PROCEDURE — 97535 SELF CARE MNGMENT TRAINING: CPT

## 2022-05-13 RX ORDER — WARFARIN SODIUM 7.5 MG/1
7.5 TABLET ORAL
Status: DISCONTINUED | OUTPATIENT
Start: 2022-05-13 | End: 2022-05-16

## 2022-05-13 RX ORDER — WARFARIN SODIUM 5 MG/1
5 TABLET ORAL
Status: DISCONTINUED | OUTPATIENT
Start: 2022-05-14 | End: 2022-05-16

## 2022-05-13 RX ADMIN — DAKIN'S SOLUTION 0.125% (QUARTER STRENGTH) 946 ML: 0.12 SOLUTION at 21:08

## 2022-05-13 RX ADMIN — NYSTATIN 1 APPLICATION: 100000 POWDER TOPICAL at 21:08

## 2022-05-13 RX ADMIN — WARFARIN 7.5 MG: 7.5 TABLET ORAL at 17:04

## 2022-05-13 RX ADMIN — FAMOTIDINE 20 MG: 20 TABLET ORAL at 08:34

## 2022-05-13 RX ADMIN — BETAMETHASONE DIPROPIONATE 1 APPLICATION: 0.5 OINTMENT TOPICAL at 21:08

## 2022-05-13 RX ADMIN — INSULIN LISPRO 4 UNITS: 100 INJECTION, SOLUTION INTRAVENOUS; SUBCUTANEOUS at 17:04

## 2022-05-13 RX ADMIN — DAKIN'S SOLUTION 0.125% (QUARTER STRENGTH) 946 ML: 0.12 SOLUTION at 08:38

## 2022-05-13 RX ADMIN — BETAMETHASONE DIPROPIONATE 1 APPLICATION: 0.5 OINTMENT TOPICAL at 08:38

## 2022-05-13 RX ADMIN — MAGNESIUM OXIDE 400 MG (241.3 MG MAGNESIUM) TABLET 400 MG: TABLET at 08:34

## 2022-05-13 RX ADMIN — ATORVASTATIN CALCIUM 40 MG: 20 TABLET, FILM COATED ORAL at 08:34

## 2022-05-13 RX ADMIN — NYSTATIN: 100000 POWDER TOPICAL at 08:38

## 2022-05-13 RX ADMIN — INSULIN LISPRO 1 UNITS: 100 INJECTION, SOLUTION INTRAVENOUS; SUBCUTANEOUS at 12:12

## 2022-05-13 RX ADMIN — DIGOXIN 62.5 MCG: 125 TABLET ORAL at 12:12

## 2022-05-13 RX ADMIN — METOPROLOL SUCCINATE 12.5 MG: 25 TABLET, EXTENDED RELEASE ORAL at 08:34

## 2022-05-13 RX ADMIN — MAGNESIUM OXIDE 400 MG (241.3 MG MAGNESIUM) TABLET 400 MG: TABLET at 21:07

## 2022-05-14 LAB
GLUCOSE BLDC GLUCOMTR-MCNC: 105 MG/DL (ref 70–130)
GLUCOSE BLDC GLUCOMTR-MCNC: 167 MG/DL (ref 70–130)
GLUCOSE BLDC GLUCOMTR-MCNC: 169 MG/DL (ref 70–130)
GLUCOSE BLDC GLUCOMTR-MCNC: 222 MG/DL (ref 70–130)

## 2022-05-14 PROCEDURE — 82962 GLUCOSE BLOOD TEST: CPT

## 2022-05-14 PROCEDURE — 97110 THERAPEUTIC EXERCISES: CPT | Performed by: PHYSICAL THERAPIST

## 2022-05-14 PROCEDURE — 97116 GAIT TRAINING THERAPY: CPT | Performed by: PHYSICAL THERAPIST

## 2022-05-14 RX ADMIN — WARFARIN SODIUM 5 MG: 5 TABLET ORAL at 17:57

## 2022-05-14 RX ADMIN — FERROUS SULFATE TAB 325 MG (65 MG ELEMENTAL FE) 325 MG: 325 (65 FE) TAB at 07:54

## 2022-05-14 RX ADMIN — MAGNESIUM OXIDE 400 MG (241.3 MG MAGNESIUM) TABLET 400 MG: TABLET at 20:18

## 2022-05-14 RX ADMIN — ATORVASTATIN CALCIUM 40 MG: 20 TABLET, FILM COATED ORAL at 07:54

## 2022-05-14 RX ADMIN — NYSTATIN: 100000 POWDER TOPICAL at 20:19

## 2022-05-14 RX ADMIN — METOPROLOL SUCCINATE 12.5 MG: 25 TABLET, EXTENDED RELEASE ORAL at 07:54

## 2022-05-14 RX ADMIN — BETAMETHASONE DIPROPIONATE 1 APPLICATION: 0.5 OINTMENT TOPICAL at 07:55

## 2022-05-14 RX ADMIN — DIGOXIN 62.5 MCG: 125 TABLET ORAL at 14:14

## 2022-05-14 RX ADMIN — BETAMETHASONE DIPROPIONATE 1 APPLICATION: 0.5 OINTMENT TOPICAL at 20:19

## 2022-05-14 RX ADMIN — FAMOTIDINE 20 MG: 20 TABLET ORAL at 07:54

## 2022-05-14 RX ADMIN — DAKIN'S SOLUTION 0.125% (QUARTER STRENGTH) 946 ML: 0.12 SOLUTION at 20:18

## 2022-05-14 RX ADMIN — MAGNESIUM OXIDE 400 MG (241.3 MG MAGNESIUM) TABLET 400 MG: TABLET at 07:54

## 2022-05-14 RX ADMIN — DAKIN'S SOLUTION 0.125% (QUARTER STRENGTH) 946 ML: 0.12 SOLUTION at 10:26

## 2022-05-15 LAB
GLUCOSE BLDC GLUCOMTR-MCNC: 140 MG/DL (ref 70–130)
GLUCOSE BLDC GLUCOMTR-MCNC: 172 MG/DL (ref 70–130)
GLUCOSE BLDC GLUCOMTR-MCNC: 189 MG/DL (ref 70–130)
GLUCOSE BLDC GLUCOMTR-MCNC: 190 MG/DL (ref 70–130)

## 2022-05-15 PROCEDURE — 82962 GLUCOSE BLOOD TEST: CPT

## 2022-05-15 PROCEDURE — 63710000001 INSULIN LISPRO (HUMAN) PER 5 UNITS: Performed by: PHYSICAL MEDICINE & REHABILITATION

## 2022-05-15 RX ADMIN — DAKIN'S SOLUTION 0.125% (QUARTER STRENGTH) 946 ML: 0.12 SOLUTION at 20:04

## 2022-05-15 RX ADMIN — NYSTATIN: 100000 POWDER TOPICAL at 08:58

## 2022-05-15 RX ADMIN — MAGNESIUM OXIDE 400 MG (241.3 MG MAGNESIUM) TABLET 400 MG: TABLET at 20:04

## 2022-05-15 RX ADMIN — INSULIN LISPRO 2 UNITS: 100 INJECTION, SOLUTION INTRAVENOUS; SUBCUTANEOUS at 11:42

## 2022-05-15 RX ADMIN — FAMOTIDINE 20 MG: 20 TABLET ORAL at 08:57

## 2022-05-15 RX ADMIN — DIGOXIN 62.5 MCG: 125 TABLET ORAL at 11:42

## 2022-05-15 RX ADMIN — INSULIN LISPRO 1 UNITS: 100 INJECTION, SOLUTION INTRAVENOUS; SUBCUTANEOUS at 16:37

## 2022-05-15 RX ADMIN — METOPROLOL SUCCINATE 12.5 MG: 25 TABLET, EXTENDED RELEASE ORAL at 08:57

## 2022-05-15 RX ADMIN — WARFARIN SODIUM 5 MG: 5 TABLET ORAL at 16:37

## 2022-05-15 RX ADMIN — ATORVASTATIN CALCIUM 40 MG: 20 TABLET, FILM COATED ORAL at 08:57

## 2022-05-15 RX ADMIN — MAGNESIUM OXIDE 400 MG (241.3 MG MAGNESIUM) TABLET 400 MG: TABLET at 08:57

## 2022-05-15 RX ADMIN — BETAMETHASONE DIPROPIONATE 1 APPLICATION: 0.5 OINTMENT TOPICAL at 08:58

## 2022-05-15 RX ADMIN — NYSTATIN 1 APPLICATION: 100000 POWDER TOPICAL at 20:04

## 2022-05-15 RX ADMIN — DAKIN'S SOLUTION 0.125% (QUARTER STRENGTH) 946 ML: 0.12 SOLUTION at 08:58

## 2022-05-15 RX ADMIN — BETAMETHASONE DIPROPIONATE 1 APPLICATION: 0.5 OINTMENT TOPICAL at 20:04

## 2022-05-16 LAB
GLUCOSE BLDC GLUCOMTR-MCNC: 148 MG/DL (ref 70–130)
GLUCOSE BLDC GLUCOMTR-MCNC: 149 MG/DL (ref 70–130)
GLUCOSE BLDC GLUCOMTR-MCNC: 163 MG/DL (ref 70–130)
GLUCOSE BLDC GLUCOMTR-MCNC: 181 MG/DL (ref 70–130)
INR PPP: 2.04 (ref 0.9–1.1)
PROTHROMBIN TIME: 23.1 SECONDS (ref 11.7–14.2)

## 2022-05-16 PROCEDURE — 97535 SELF CARE MNGMENT TRAINING: CPT

## 2022-05-16 PROCEDURE — 97110 THERAPEUTIC EXERCISES: CPT

## 2022-05-16 PROCEDURE — 85610 PROTHROMBIN TIME: CPT | Performed by: PHYSICAL MEDICINE & REHABILITATION

## 2022-05-16 PROCEDURE — 82962 GLUCOSE BLOOD TEST: CPT

## 2022-05-16 PROCEDURE — 97530 THERAPEUTIC ACTIVITIES: CPT

## 2022-05-16 PROCEDURE — 25010000002 ENOXAPARIN PER 10 MG: Performed by: PHYSICAL MEDICINE & REHABILITATION

## 2022-05-16 RX ORDER — ENOXAPARIN SODIUM 100 MG/ML
1 INJECTION SUBCUTANEOUS ONCE
Status: COMPLETED | OUTPATIENT
Start: 2022-05-16 | End: 2022-05-16

## 2022-05-16 RX ORDER — WARFARIN SODIUM 10 MG/1
10 TABLET ORAL
Status: COMPLETED | OUTPATIENT
Start: 2022-05-16 | End: 2022-05-16

## 2022-05-16 RX ADMIN — NYSTATIN: 100000 POWDER TOPICAL at 08:37

## 2022-05-16 RX ADMIN — BETAMETHASONE DIPROPIONATE 1 APPLICATION: 0.5 OINTMENT TOPICAL at 20:36

## 2022-05-16 RX ADMIN — ATORVASTATIN CALCIUM 40 MG: 20 TABLET, FILM COATED ORAL at 08:33

## 2022-05-16 RX ADMIN — DAKIN'S SOLUTION 0.125% (QUARTER STRENGTH) 946 ML: 0.12 SOLUTION at 08:35

## 2022-05-16 RX ADMIN — DAKIN'S SOLUTION 0.125% (QUARTER STRENGTH) 946 ML: 0.12 SOLUTION at 20:37

## 2022-05-16 RX ADMIN — BETAMETHASONE DIPROPIONATE 1 APPLICATION: 0.5 OINTMENT TOPICAL at 08:35

## 2022-05-16 RX ADMIN — NYSTATIN 1 APPLICATION: 100000 POWDER TOPICAL at 20:36

## 2022-05-16 RX ADMIN — METOPROLOL SUCCINATE 12.5 MG: 25 TABLET, EXTENDED RELEASE ORAL at 08:33

## 2022-05-16 RX ADMIN — ENOXAPARIN SODIUM 60 MG: 100 INJECTION SUBCUTANEOUS at 16:08

## 2022-05-16 RX ADMIN — MAGNESIUM OXIDE 400 MG (241.3 MG MAGNESIUM) TABLET 400 MG: TABLET at 08:33

## 2022-05-16 RX ADMIN — MAGNESIUM OXIDE 400 MG (241.3 MG MAGNESIUM) TABLET 400 MG: TABLET at 20:36

## 2022-05-16 RX ADMIN — WARFARIN 10 MG: 10 TABLET ORAL at 19:17

## 2022-05-16 RX ADMIN — FERROUS SULFATE TAB 325 MG (65 MG ELEMENTAL FE) 325 MG: 325 (65 FE) TAB at 08:33

## 2022-05-16 RX ADMIN — DIGOXIN 62.5 MCG: 125 TABLET ORAL at 12:30

## 2022-05-16 RX ADMIN — FAMOTIDINE 20 MG: 20 TABLET ORAL at 08:33

## 2022-05-17 LAB
GLUCOSE BLDC GLUCOMTR-MCNC: 123 MG/DL (ref 70–130)
GLUCOSE BLDC GLUCOMTR-MCNC: 147 MG/DL (ref 70–130)
GLUCOSE BLDC GLUCOMTR-MCNC: 172 MG/DL (ref 70–130)
GLUCOSE BLDC GLUCOMTR-MCNC: 235 MG/DL (ref 70–130)
INR PPP: 2.12 (ref 0.9–1.1)
PROTHROMBIN TIME: 23.8 SECONDS (ref 11.7–14.2)

## 2022-05-17 PROCEDURE — 25010000002 ENOXAPARIN PER 10 MG: Performed by: PHYSICAL MEDICINE & REHABILITATION

## 2022-05-17 PROCEDURE — 85610 PROTHROMBIN TIME: CPT | Performed by: PHYSICAL MEDICINE & REHABILITATION

## 2022-05-17 PROCEDURE — 82962 GLUCOSE BLOOD TEST: CPT

## 2022-05-17 PROCEDURE — 97110 THERAPEUTIC EXERCISES: CPT

## 2022-05-17 PROCEDURE — 97530 THERAPEUTIC ACTIVITIES: CPT

## 2022-05-17 PROCEDURE — 97535 SELF CARE MNGMENT TRAINING: CPT

## 2022-05-17 PROCEDURE — 63710000001 INSULIN LISPRO (HUMAN) PER 5 UNITS: Performed by: PHYSICAL MEDICINE & REHABILITATION

## 2022-05-17 RX ORDER — WARFARIN SODIUM 7.5 MG/1
7.5 TABLET ORAL
Status: DISCONTINUED | OUTPATIENT
Start: 2022-05-18 | End: 2022-05-18 | Stop reason: HOSPADM

## 2022-05-17 RX ORDER — WARFARIN SODIUM 5 MG/1
5 TABLET ORAL
Status: DISCONTINUED | OUTPATIENT
Start: 2022-05-20 | End: 2022-05-18 | Stop reason: HOSPADM

## 2022-05-17 RX ORDER — ENOXAPARIN SODIUM 100 MG/ML
1 INJECTION SUBCUTANEOUS ONCE
Status: COMPLETED | OUTPATIENT
Start: 2022-05-17 | End: 2022-05-17

## 2022-05-17 RX ORDER — WARFARIN SODIUM 10 MG/1
10 TABLET ORAL
Status: COMPLETED | OUTPATIENT
Start: 2022-05-17 | End: 2022-05-17

## 2022-05-17 RX ADMIN — NYSTATIN: 100000 POWDER TOPICAL at 20:21

## 2022-05-17 RX ADMIN — MAGNESIUM OXIDE 400 MG (241.3 MG MAGNESIUM) TABLET 400 MG: TABLET at 20:20

## 2022-05-17 RX ADMIN — INSULIN LISPRO 2 UNITS: 100 INJECTION, SOLUTION INTRAVENOUS; SUBCUTANEOUS at 11:36

## 2022-05-17 RX ADMIN — NYSTATIN 1 APPLICATION: 100000 POWDER TOPICAL at 07:38

## 2022-05-17 RX ADMIN — DAKIN'S SOLUTION 0.125% (QUARTER STRENGTH) 946 ML: 0.12 SOLUTION at 07:33

## 2022-05-17 RX ADMIN — DIGOXIN 62.5 MCG: 125 TABLET ORAL at 11:36

## 2022-05-17 RX ADMIN — BETAMETHASONE DIPROPIONATE 1 APPLICATION: 0.5 OINTMENT TOPICAL at 07:34

## 2022-05-17 RX ADMIN — DAKIN'S SOLUTION 0.125% (QUARTER STRENGTH) 946 ML: 0.12 SOLUTION at 20:21

## 2022-05-17 RX ADMIN — METOPROLOL SUCCINATE 12.5 MG: 25 TABLET, EXTENDED RELEASE ORAL at 07:33

## 2022-05-17 RX ADMIN — ENOXAPARIN SODIUM 60 MG: 100 INJECTION SUBCUTANEOUS at 13:19

## 2022-05-17 RX ADMIN — BETAMETHASONE DIPROPIONATE 1 APPLICATION: 0.5 OINTMENT TOPICAL at 20:21

## 2022-05-17 RX ADMIN — FAMOTIDINE 20 MG: 20 TABLET ORAL at 07:33

## 2022-05-17 RX ADMIN — WARFARIN 10 MG: 10 TABLET ORAL at 17:20

## 2022-05-17 RX ADMIN — ATORVASTATIN CALCIUM 40 MG: 20 TABLET, FILM COATED ORAL at 07:33

## 2022-05-17 RX ADMIN — MAGNESIUM OXIDE 400 MG (241.3 MG MAGNESIUM) TABLET 400 MG: TABLET at 07:33

## 2022-05-18 ENCOUNTER — HOME HEALTH ADMISSION (OUTPATIENT)
Dept: HOME HEALTH SERVICES | Facility: HOME HEALTHCARE | Age: 87
End: 2022-05-18

## 2022-05-18 ENCOUNTER — TRANSCRIBE ORDERS (OUTPATIENT)
Dept: HOME HEALTH SERVICES | Facility: HOME HEALTHCARE | Age: 87
End: 2022-05-18

## 2022-05-18 VITALS
OXYGEN SATURATION: 98 % | DIASTOLIC BLOOD PRESSURE: 65 MMHG | TEMPERATURE: 98.6 F | HEIGHT: 72 IN | SYSTOLIC BLOOD PRESSURE: 133 MMHG | HEART RATE: 68 BPM | RESPIRATION RATE: 18 BRPM | WEIGHT: 129.19 LBS | BODY MASS INDEX: 17.5 KG/M2

## 2022-05-18 DIAGNOSIS — G54.1 LUMBOSACRAL PLEXOPATHY: Primary | ICD-10-CM

## 2022-05-18 DIAGNOSIS — S70.12XA HEMATOMA OF LEFT ILIOPSOAS MUSCLE, INITIAL ENCOUNTER: ICD-10-CM

## 2022-05-18 LAB
GLUCOSE BLDC GLUCOMTR-MCNC: 116 MG/DL (ref 70–130)
GLUCOSE BLDC GLUCOMTR-MCNC: 121 MG/DL (ref 70–130)
INR PPP: 2.73 (ref 0.9–1.1)
PROTHROMBIN TIME: 29.1 SECONDS (ref 11.7–14.2)

## 2022-05-18 PROCEDURE — 82962 GLUCOSE BLOOD TEST: CPT

## 2022-05-18 PROCEDURE — 85610 PROTHROMBIN TIME: CPT | Performed by: PHYSICAL MEDICINE & REHABILITATION

## 2022-05-18 RX ORDER — INSULIN LISPRO 100 [IU]/ML
INJECTION, SOLUTION INTRAVENOUS; SUBCUTANEOUS
Qty: 30 ML | Refills: 6 | Status: SHIPPED | OUTPATIENT
Start: 2022-05-18 | End: 2022-11-15

## 2022-05-18 RX ORDER — SODIUM HYPOCHLORITE 1.25 MG/ML
1 SOLUTION TOPICAL EVERY 12 HOURS SCHEDULED
Status: DISCONTINUED | OUTPATIENT
Start: 2022-05-18 | End: 2022-05-18 | Stop reason: HOSPADM

## 2022-05-18 RX ORDER — NYSTATIN 100000 [USP'U]/G
POWDER TOPICAL EVERY 12 HOURS SCHEDULED
Start: 2022-05-18 | End: 2022-08-26

## 2022-05-18 RX ORDER — WARFARIN SODIUM 7.5 MG/1
TABLET ORAL
Start: 2022-05-18 | End: 2022-08-29 | Stop reason: SDUPTHER

## 2022-05-18 RX ORDER — ACETAMINOPHEN 325 MG/1
650 TABLET ORAL EVERY 6 HOURS PRN
Start: 2022-05-18

## 2022-05-18 RX ORDER — BETAMETHASONE DIPROPIONATE 0.05 %
1 OINTMENT (GRAM) TOPICAL EVERY 12 HOURS SCHEDULED
Qty: 45 G | Refills: 1 | Status: SHIPPED | OUTPATIENT
Start: 2022-05-18 | End: 2022-08-26

## 2022-05-18 RX ORDER — METOPROLOL SUCCINATE 25 MG/1
12.5 TABLET, EXTENDED RELEASE ORAL DAILY
Qty: 45 TABLET | Refills: 0 | Status: SHIPPED | OUTPATIENT
Start: 2022-05-18 | End: 2023-01-30

## 2022-05-18 RX ORDER — WARFARIN SODIUM 5 MG/1
TABLET ORAL
Start: 2022-05-20 | End: 2022-08-29

## 2022-05-18 RX ORDER — FAMOTIDINE 20 MG/1
20 TABLET, FILM COATED ORAL DAILY
Qty: 90 TABLET | Refills: 0 | Status: SHIPPED | OUTPATIENT
Start: 2022-05-18 | End: 2022-08-26 | Stop reason: SDUPTHER

## 2022-05-18 RX ADMIN — ATORVASTATIN CALCIUM 40 MG: 20 TABLET, FILM COATED ORAL at 09:10

## 2022-05-18 RX ADMIN — MAGNESIUM OXIDE 400 MG (241.3 MG MAGNESIUM) TABLET 400 MG: TABLET at 09:10

## 2022-05-18 RX ADMIN — DIGOXIN 62.5 MCG: 125 TABLET ORAL at 13:06

## 2022-05-18 RX ADMIN — FERROUS SULFATE TAB 325 MG (65 MG ELEMENTAL FE) 325 MG: 325 (65 FE) TAB at 09:10

## 2022-05-18 RX ADMIN — BETAMETHASONE DIPROPIONATE 1 APPLICATION: 0.5 OINTMENT TOPICAL at 09:10

## 2022-05-18 RX ADMIN — DAKIN'S SOLUTION 0.125% (QUARTER STRENGTH) 946 ML: 0.12 SOLUTION at 09:10

## 2022-05-18 RX ADMIN — FAMOTIDINE 20 MG: 20 TABLET ORAL at 09:09

## 2022-05-18 RX ADMIN — NYSTATIN: 100000 POWDER TOPICAL at 09:11

## 2022-05-18 RX ADMIN — METOPROLOL SUCCINATE 12.5 MG: 25 TABLET, EXTENDED RELEASE ORAL at 09:09

## 2022-05-19 ENCOUNTER — HOME CARE VISIT (OUTPATIENT)
Dept: HOME HEALTH SERVICES | Facility: HOME HEALTHCARE | Age: 87
End: 2022-05-19

## 2022-05-19 ENCOUNTER — READMISSION MANAGEMENT (OUTPATIENT)
Dept: CALL CENTER | Facility: HOSPITAL | Age: 87
End: 2022-05-19

## 2022-05-19 ENCOUNTER — ANTICOAGULATION VISIT (OUTPATIENT)
Dept: PHARMACY | Facility: HOSPITAL | Age: 87
End: 2022-05-19

## 2022-05-19 ENCOUNTER — TRANSITIONAL CARE MANAGEMENT TELEPHONE ENCOUNTER (OUTPATIENT)
Dept: CALL CENTER | Facility: HOSPITAL | Age: 87
End: 2022-05-19

## 2022-05-19 DIAGNOSIS — I63.411 CEREBROVASCULAR ACCIDENT (CVA) DUE TO EMBOLISM OF RIGHT MIDDLE CEREBRAL ARTERY: ICD-10-CM

## 2022-05-19 DIAGNOSIS — Z95.2 H/O HEART VALVE REPLACEMENT WITH MECHANICAL VALVE: Primary | ICD-10-CM

## 2022-05-19 LAB — INR PPP: 3.9

## 2022-05-19 PROCEDURE — G0299 HHS/HOSPICE OF RN EA 15 MIN: HCPCS

## 2022-05-19 NOTE — OUTREACH NOTE
Call Center TCM Note    Flowsheet Row Responses   [facility] patient discharged from? [place]   Does the patient have one of the following disease processes/diagnoses(primary or secondary)? Other   TCM attempt successful? Yes   Call start time 1159   Call end time 1202   Discharge diagnosis Hematoma of left iliopsoas muscle   Person spoke with today (if not patient) and relationship Patient   Meds reviewed with patient/caregiver? Yes   Is the patient having any side effects they believe may be caused by any medication additions or changes? No   Does the patient have all medications ordered at discharge? Yes   Is the patient taking all medications as directed (includes completed medication regime)? Yes   Does the patient have a primary care provider?  Yes   Does the patient have an appointment with their PCP within 7 days of discharge? Yes   Comments regarding PCP hospital fu appt on 5/24/22 at 2:15 PM   Has the patient kept scheduled appointments due by today? N/A   What is the Home health agency?  [facility]    Has home health visited the patient within 72 hours of discharge? Yes   Home health comments  visit today, 5/19/22   Psychosocial issues? No   Did the patient receive a copy of their discharge instructions? Yes   Nursing interventions Reviewed instructions with patient   What is the patient's perception of their health status since discharge? Improving   Is the patient/caregiver able to teach back signs and symptoms related to disease process for when to call PCP? Yes   Is the patient/caregiver able to teach back signs and symptoms related to disease process for when to call 911? Yes   Is the patient/caregiver able to teach back the hierarchy of who to call/visit for symptoms/problems? PCP, Specialist, Home health nurse, Urgent Care, ED, 911 Yes   If the patient is a current smoker, are they able to teach back resources for cessation? Not a smoker   TCM call completed? Yes   Wrap up additional comments  Pt states he is doing better. HH visit today. Pt verified PCP hospital fu appt on 5/24/22. No questions/concerns.          Gege Walker RN    5/19/2022, 12:03 EDT

## 2022-05-19 NOTE — HOME HEALTH
pt is a 86 yr old male patient whom lives with his wife he has a medical history of afib, mechanicl heart valve, anemia, storke, ckd stage 3, and most recently had a hospitalation for lumbarsacral plexopathy and unable to walk. he is wheelchair bound and uses a walker to stand he went to rehab at [facility] and is now able to somewhat move the left leg but still will reuqire physcial therapy to help. he also wears a insulin pump as well.  coccyx area red that barrier cream and border gauze is applied per wife

## 2022-05-19 NOTE — PROGRESS NOTES
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]             Anticoagulation Episode Summary     INR check location:      Preferred lab:      [facility]  [facility]    Comments:  New INR goal 3 - 3.5 (see 1/2020 hospitalization for CVA)      Anticoagulation Care Providers     Provider Role Specialty Phone number    Griffin Fried MD Referring Cardiology [phone]          Clinic Interview:  Patient Findings     Positives:  Hospital admission, Other complaints    Negatives:  Signs/symptoms of thrombosis, Signs/symptoms of bleeding,   Laboratory test error suspected, Change in health, Change in alcohol use,   Change in activity, Upcoming invasive procedure, Emergency department   visit, Upcoming dental procedure, Missed doses, Extra doses, Change in   medications, Change in diet/appetite, Bruising    Comments:  Admitted 4/5/22 - 5/18/22 r/t left iliopsoas hematoma with   left lumbosacral plexopathy and left lower extremity weakness. Reports   significant weight loss over hospitalization.       Clinical Outcomes     Negatives:  Major bleeding event, Thromboembolic event,   Anticoagulation-related hospital admission, Anticoagulation-related ED   visit, Anticoagulation-related fatality    Comments:  Admitted 4/5/22 - 5/18/22 r/t left iliopsoas hematoma with   left lumbosacral plexopathy and left lower extremity  weakness. Reports   significant weight loss over hospitalization.         INR History:  Anticoagulation Monitoring 3/18/2022 4/5/2022 5/19/2022   INR 3.50 1.30 3.90   INR Date 3/18/2022 4/5/2022 5/19/2022   INR Goal 3.0-3.5 3.0-3.5 3.0-3.5   Trend Same Same Same   Last Week Total 42.5 mg 17.5 mg 50 mg   Next Week Total 42.5 mg 50 mg 40 mg   Sun 5 mg - -   Mon 7.5 mg - -   Tue 5 mg 10 mg (4/5) -   Wed 7.5 mg 10 mg (4/6) -   Thu 5 mg - 2.5 mg (5/19)   Fri 7.5 mg - -   Sat 5 mg - -   Visit Report - - -   Some recent data might be hidden       Plan:  1. INR is Supratherapeutic today- see above in Anticoagulation Summary.   Will instruct Francisco Sequeira to Change their warfarin regimen- see above in Anticoagulation Summary.  2. Follow up in 1 day.  3. They have been instructed to call if any changes in medications, doses, concerns, etc. Patient expresses understanding and has no further questions at this time.    Vasquez Niño, PharmD

## 2022-05-19 NOTE — OUTREACH NOTE
Prep Survey    Flowsheet Row Responses   List of hospitals in [place] patient discharged from? [facility]   Is LACE score < 7 ? Yes   Emergency Room discharge w/ pulse ox? No   Eligibility [facility]   Date of Admission 05/20/22   Date of Discharge 05/18/22   Discharge Disposition Home-Health Care Sv   Discharge diagnosis Hematoma of left iliopsoas muscle   Does the patient have one of the following disease processes/diagnoses(primary or secondary)? Other   Does the patient have Home health ordered? Yes   What is the Home health agency?  [facility]    Is there a DME ordered? No   Prep survey completed? Yes          MG HOFF - Registered Nurse

## 2022-05-20 ENCOUNTER — HOME CARE VISIT (OUTPATIENT)
Dept: HOME HEALTH SERVICES | Facility: HOME HEALTHCARE | Age: 87
End: 2022-05-20

## 2022-05-20 ENCOUNTER — ANTICOAGULATION VISIT (OUTPATIENT)
Dept: PHARMACY | Facility: HOSPITAL | Age: 87
End: 2022-05-20

## 2022-05-20 VITALS
HEART RATE: 109 BPM | OXYGEN SATURATION: 98 % | DIASTOLIC BLOOD PRESSURE: 64 MMHG | SYSTOLIC BLOOD PRESSURE: 118 MMHG | RESPIRATION RATE: 18 BRPM

## 2022-05-20 DIAGNOSIS — Z95.2 H/O HEART VALVE REPLACEMENT WITH MECHANICAL VALVE: Primary | ICD-10-CM

## 2022-05-20 DIAGNOSIS — I63.411 CEREBROVASCULAR ACCIDENT (CVA) DUE TO EMBOLISM OF RIGHT MIDDLE CEREBRAL ARTERY: ICD-10-CM

## 2022-05-20 LAB — INR PPP: 4.4

## 2022-05-20 PROCEDURE — G0151 HHCP-SERV OF PT,EA 15 MIN: HCPCS

## 2022-05-20 NOTE — HOME HEALTH
Physical Therapy Evaluation   Diagnosis: lumbosacral plexopathy, iliopsoas hematoma  Surgical Procedure and date: n/a  Pertinent Medical History: see epic, open heart surgeries, CVA's, fluid on lungs and gall bladder    Prior level of function:   Ambulation: was walking with walker or cane around the house   Activities of daily living: was getting assist   Instrumental activities of daily living: family assist   Driving: does not drive   Other/ Hobbies/Work:    Edema:    none noted  Home/ social environment:  lives with wife in home with steps to enter/ exit  Goal for Therapy: to walk    Plan for next visit: review exercises, walk further distance than 30 feet

## 2022-05-20 NOTE — PROGRESS NOTES
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]             Anticoagulation Episode Summary     INR check location:      Preferred lab:      Send INR reminders to:  [facility]  POOL    Comments:  New INR goal 3 - 3.5 (see 2020 hospitalization for CVA)      Anticoagulation Care Providers     Provider Role Specialty Phone number    Griffin Fried MD Referring Cardiology [phone]          Clinic Interview:  Patient Findings     Negatives:  Signs/symptoms of thrombosis, Signs/symptoms of bleeding,   Laboratory test error suspected, Change in health, Change in alcohol use,   Change in activity, Upcoming invasive procedure, Emergency department   visit, Upcoming dental procedure, Missed doses, Extra doses, Change in   medications, Change in diet/appetite, Hospital admission, Bruising, Other   complaints    Comments:  Pt lost 50 lbs while hospitalized.       Clinical Outcomes     Negatives:  Major bleeding event, Thromboembolic event,   Anticoagulation-related hospital admission, Anticoagulation-related ED   visit, Anticoagulation-related fatality    Comments:  Pt lost 50 lbs while hospitalized.         INR History:  Anticoagulation Monitoring 2022   INR 1.30 3.90 4.40   INR Date 2022   INR Goal 3.0-3.5 3.0-3.5 3.0-3.5   Trend Same Same Same   Last Week Total 17.5 mg 50 mg 47.5 mg   Next  Week Total 50 mg 40 mg 37.5 mg   Sun - - 5 mg   Mon - - -   Tue 10 mg (4/5) - -   Wed 10 mg (4/6) - -   Thu - 2.5 mg (5/19) -   Fri - - 2.5 mg (5/20)   Sat - - 5 mg   Visit Report - - -   Some recent data might be hidden       Plan:  1. INR is Supratherapeutic today- see above in Anticoagulation Summary.   Will instruct Francisco Sequeira to Change their warfarin regimen- see above in Anticoagulation Summary.  2. Follow up in 3 days.  3. They have been instructed to call if any changes in medications, doses, concerns, etc. Patient expresses understanding and has no further questions at this time.    Carmen El, PharmD

## 2022-05-20 NOTE — CASE COMMUNICATION
Patient to be 1w1, 2w4 for PT.  Hematoma in left ilopsoas causing poor hip flexion and knee extension.  Has knee immobilizer which he is walking and transferring with at present but is getting a more permanent brace.  Needs instruction in transfers, gait with wife and steps.  Has initial HEP.

## 2022-05-23 ENCOUNTER — HOME CARE VISIT (OUTPATIENT)
Dept: HOME HEALTH SERVICES | Facility: HOME HEALTHCARE | Age: 87
End: 2022-05-23

## 2022-05-23 ENCOUNTER — ANTICOAGULATION VISIT (OUTPATIENT)
Dept: PHARMACY | Facility: HOSPITAL | Age: 87
End: 2022-05-23

## 2022-05-23 VITALS — HEART RATE: 69 BPM | SYSTOLIC BLOOD PRESSURE: 118 MMHG | DIASTOLIC BLOOD PRESSURE: 64 MMHG | TEMPERATURE: 97.8 F

## 2022-05-23 VITALS
TEMPERATURE: 97.9 F | OXYGEN SATURATION: 99 % | SYSTOLIC BLOOD PRESSURE: 118 MMHG | DIASTOLIC BLOOD PRESSURE: 72 MMHG | HEART RATE: 74 BPM

## 2022-05-23 DIAGNOSIS — Z95.2 H/O HEART VALVE REPLACEMENT WITH MECHANICAL VALVE: Primary | ICD-10-CM

## 2022-05-23 DIAGNOSIS — I63.411 CEREBROVASCULAR ACCIDENT (CVA) DUE TO EMBOLISM OF RIGHT MIDDLE CEREBRAL ARTERY: ICD-10-CM

## 2022-05-23 LAB — INR PPP: 3

## 2022-05-23 PROCEDURE — G0152 HHCP-SERV OF OT,EA 15 MIN: HCPCS

## 2022-05-23 PROCEDURE — G0151 HHCP-SERV OF PT,EA 15 MIN: HCPCS

## 2022-05-23 RX ORDER — FUROSEMIDE 20 MG/1
TABLET ORAL
Qty: 270 TABLET | Refills: 0 | Status: SHIPPED | OUTPATIENT
Start: 2022-05-23 | End: 2022-08-26

## 2022-05-23 NOTE — HOME HEALTH
"CURRENT SITUATION: Mr. Sequeira is a 86 y.o. male with a history of valvular heart disease, afib, chronic AC, CKD, DM, HTN, NICM, and multiple medical issues that presents to [facility] (04/05/22) complaining of LLE weakness, swelling. He was discharged from [facility] 4/4/22 following hospitalization for acute cholecystitis s/p lap cholecystectomy 3/29/22 and aspiration pneumonia. He was discharged this time to [facility] for lumbarsacral plexopathy iliopsoas hematoma & and inability to walk, he is w/c bound and uses a LLE knee immobilizer and walker to stand. He has a stage 1 to coccyx area.  SUBJECTIVE: \"I did steps while I was in rehab. They weren't that hard to do.\"  Agreeable to OT evaluation.  SOCIAL & ENVIRONMENTAL SITUATION: Pt lives w/wife in well kept home, steps to enter via back door (1 at door, no rail and 3 inside from den to kitchen, 1 rail.)  PATIENT'S &/OR CAREGIVER'S GOAL: \"Walk, fix my leg.\"  INTERVENTIONS: OT Eval, ADL training, Home Safety, Therapeutic Exercise/Activity, Transfer/Mobility training, Monitor vitals including SPO2 via pulse-oximeter (notifiy MD if resting O2 <90% on room air), Patient/CG education, Falls risk prevention, Recommendations on AE, DME, AD, environmental adaptations.  ASSESSMENT, MEDICAL NECESSITY, PLAN: OT appropriate for 2w3 to improve safety and independence w/ADLs, transfers, mobilities.  PLAN FOR NEXT VISIT: transfers, standing, BUE AROM HEP."

## 2022-05-23 NOTE — PROGRESS NOTES
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]             Anticoagulation Episode Summary     INR check location:      Preferred lab:      Send INR reminders to:  [facility]  POOL    Comments:  New INR goal 3 - 3.5 (see 1/2020 hospitalization for CVA)      Anticoagulation Care Providers     Provider Role Specialty Phone number    Griffin Fried MD Referring Cardiology [phone]          Clinic Interview:  Patient Findings     Negatives:  Signs/symptoms of thrombosis, Signs/symptoms of bleeding,   Laboratory test error suspected, Change in health, Change in alcohol use,   Change in activity, Upcoming invasive procedure, Emergency department   visit, Upcoming dental procedure, Missed doses, Extra doses, Change in   medications, Change in diet/appetite, Hospital admission, Bruising, Other   complaints      Clinical Outcomes     Negatives:  Major bleeding event, Thromboembolic event,   Anticoagulation-related hospital admission, Anticoagulation-related ED   visit, Anticoagulation-related fatality        INR History:  Anticoagulation Monitoring 5/19/2022 5/20/2022 5/23/2022   INR 3.90 4.40 3.00   INR Date 5/19/2022 5/20/2022 5/23/2022   INR Goal 3.0-3.5 3.0-3.5 3.0-3.5   Trend Same Same Same   Last Week Total 50 mg 47.5 mg 42.5 mg   Next Week Total 40 mg 37.5 mg 42.5 mg   Sun - 5 mg -   Mon - - 7.5 mg   Tue - - 5 mg   Wed - - 7.5 mg    Thu 2.5 mg (5/19) - 5 mg   Fri - 2.5 mg (5/20) -   Sat - 5 mg -   Visit Report - - -   Some recent data might be hidden       Plan:  1. INR is Therapeutic today- see above in Anticoagulation Summary.   Will instruct Francisco Sequeira to Continue their warfarin regimen- see above in Anticoagulation Summary.  2. Follow up in 4 days (5/27).   3. They have been instructed to call if any changes in medications, doses, concerns, etc. Patient expresses understanding and has no further questions at this time.    Carmen El, PharmD

## 2022-05-24 ENCOUNTER — OFFICE VISIT (OUTPATIENT)
Dept: FAMILY MEDICINE CLINIC | Facility: CLINIC | Age: 87
End: 2022-05-24

## 2022-05-24 VITALS
HEART RATE: 73 BPM | WEIGHT: 128 LBS | HEIGHT: 72 IN | BODY MASS INDEX: 17.34 KG/M2 | SYSTOLIC BLOOD PRESSURE: 130 MMHG | TEMPERATURE: 98 F | DIASTOLIC BLOOD PRESSURE: 60 MMHG | OXYGEN SATURATION: 99 %

## 2022-05-24 DIAGNOSIS — Z90.49 S/P LAPAROSCOPIC CHOLECYSTECTOMY: ICD-10-CM

## 2022-05-24 DIAGNOSIS — S70.12XA HEMATOMA OF LEFT ILIOPSOAS MUSCLE, INITIAL ENCOUNTER: ICD-10-CM

## 2022-05-24 DIAGNOSIS — Z09 HOSPITAL DISCHARGE FOLLOW-UP: Primary | ICD-10-CM

## 2022-05-24 DIAGNOSIS — J69.0 ASPIRATION PNEUMONITIS: ICD-10-CM

## 2022-05-24 PROCEDURE — 99495 TRANSJ CARE MGMT MOD F2F 14D: CPT | Performed by: NURSE PRACTITIONER

## 2022-05-24 PROCEDURE — 1111F DSCHRG MED/CURRENT MED MERGE: CPT | Performed by: NURSE PRACTITIONER

## 2022-05-24 NOTE — HOME HEALTH
Patient seated in wheelchair upon PT arrival.  OT evaluation has just occurred shortly before.  Wife states patient has only exercised once today but knew PT was likely coming to see him.  Patient denies pain but still reports significant weakness.      Wife is concerned has has lost strength since returning home because he is likely not as active.    Plan for Next Visit:  -Increase patient's participate in exercises with left lower extremity  -Perform standing exercises with brace in place  -Review turning to the right with ambulation, clearance of left foot

## 2022-05-24 NOTE — PROGRESS NOTES
Transitional Care Follow Up Visit  Subjective     Francisco Sequeira is a 86 y.o. male who presents for a transitional care management visit.    Within 48 business hours after discharge our office contacted him via telephone to coordinate his care and needs.      I reviewed and discussed the details of that call along with the discharge summary, hospital problems, inpatient lab results, inpatient diagnostic studies, and consultation reports with Francisco.     Current outpatient and discharge medications have been reconciled for the patient.  Reviewed by: LIZA Trinh      Date of TCM Phone Call 5/19/2022   [facility]   Date of Admission 5/20/2022   Date of Discharge 5/18/2022   Discharge Disposition [facility]     Risk for Readmission (LACE) Score: 19 (5/18/2022  6:02 AM)      History of Present Illness   Course During Hospital Stay: Patient presented to the emergency room on 4/5/2022 with complaining of left lower extremity weakness and swelling.  He was discharged from [facility] on 4/4/2022 following hospitalization for acute cholecystitis status post lap cholecystectomy on 3/29/2022 and aspiration pneumonia.  He was discharged on Lovenox bridge and warfarin for subtherapeutic INR.  He was doing well as far as walking.  But the day prior to presenting to the emergency room he began feeling weakness in his left lower extremity and having difficulty walking and was unable to get off of the toilet.  He denied severe pain in the emergency room but had some swelling of that lower leg.  Patient was found to have hematoma in the left iliopsoas muscle.  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  They did attempt to aspirate the hematoma or not successful.  He was still weak after being in the hospital for a while so he was sent to acute rehab for strengthening.  Patient was admitted to rehab on 4/20/2022 and discharged on 5/18/2022   He was  "discharged with home health therapies.  INR last Friday was 3.0 and he is currently on 7.5mg three days a week and 5mg four days a week.  Rechecking this Friday  Today he states that he feels Fine today.  \"No problems\" No pain. Pt and OT is coming to his home twice a week along with nursing twice a week as well. Still not walking at all by himself and using a brace on left knee bc left knee elizabeth. He is getting a custum made brace for his knee.        The following portions of the patient's history were reviewed and updated as appropriate: allergies, current medications, past family history, past medical history, past social history, past surgical history and problem list.    Review of Systems   Constitutional: Negative for fever.   Respiratory: Negative for cough and shortness of breath.    Cardiovascular: Negative for chest pain.   Gastrointestinal: Negative for abdominal pain.   Neurological: Negative for dizziness.   All other systems reviewed and are negative.      Objective   Physical Exam  Vitals reviewed.   Constitutional:       General: He is not in acute distress.     Appearance: He is well-developed.   HENT:      Head: Normocephalic.   Cardiovascular:      Rate and Rhythm: Normal rate and regular rhythm.      Heart sounds: Normal heart sounds.   Pulmonary:      Effort: Pulmonary effort is normal.      Breath sounds: Normal breath sounds.   Neurological:      Mental Status: He is alert and oriented to person, place, and time.      Gait: Gait normal.   Psychiatric:         Behavior: Behavior normal.         Thought Content: Thought content normal.         Judgment: Judgment normal.         Assessment & Plan   Diagnoses and all orders for this visit:    1. Hospital discharge follow-up (Primary)    2. Hematoma of left iliopsoas muscle, initial encounter    3. Aspiration pneumonitis (HCC)    4. S/P laparoscopic cholecystectomy        Cont same with meds, PT, OT and nursing  RTO in 3 mons for a1c    Mask and " GotGame worn

## 2022-05-25 ENCOUNTER — HOME CARE VISIT (OUTPATIENT)
Dept: HOME HEALTH SERVICES | Facility: HOME HEALTHCARE | Age: 87
End: 2022-05-25

## 2022-05-25 VITALS
HEART RATE: 78 BPM | RESPIRATION RATE: 20 BRPM | SYSTOLIC BLOOD PRESSURE: 122 MMHG | TEMPERATURE: 98.4 F | DIASTOLIC BLOOD PRESSURE: 68 MMHG | OXYGEN SATURATION: 99 %

## 2022-05-25 VITALS
SYSTOLIC BLOOD PRESSURE: 132 MMHG | DIASTOLIC BLOOD PRESSURE: 64 MMHG | HEART RATE: 74 BPM | OXYGEN SATURATION: 99 % | RESPIRATION RATE: 16 BRPM | TEMPERATURE: 98.2 F

## 2022-05-25 PROCEDURE — G0299 HHS/HOSPICE OF RN EA 15 MIN: HCPCS

## 2022-05-26 ENCOUNTER — HOME CARE VISIT (OUTPATIENT)
Dept: HOME HEALTH SERVICES | Facility: HOME HEALTHCARE | Age: 87
End: 2022-05-26

## 2022-05-26 VITALS
TEMPERATURE: 97.9 F | OXYGEN SATURATION: 94 % | DIASTOLIC BLOOD PRESSURE: 82 MMHG | SYSTOLIC BLOOD PRESSURE: 122 MMHG | HEART RATE: 89 BPM

## 2022-05-26 PROCEDURE — G0151 HHCP-SERV OF PT,EA 15 MIN: HCPCS

## 2022-05-26 NOTE — HOME HEALTH
gallbladder taken out , back to [facility] 4/5 not able to move his left leg, or walk  He was found to have a hematoma on his leg. It was aspirated at [facility] and they d/c lovenox and aspirin,   He is on Coumadin now. She has a machine and she test it at home, last Monday it was 3.0 and she will check it again on Friday   He has no pain , doing well. In wheelchair when I arrived with gait belt on.   He did slip out of wc onto the floor at hospital but none at home, PT saw him today .

## 2022-05-27 ENCOUNTER — HOME CARE VISIT (OUTPATIENT)
Dept: HOME HEALTH SERVICES | Facility: HOME HEALTHCARE | Age: 87
End: 2022-05-27

## 2022-05-27 ENCOUNTER — ANTICOAGULATION VISIT (OUTPATIENT)
Dept: PHARMACY | Facility: HOSPITAL | Age: 87
End: 2022-05-27

## 2022-05-27 VITALS
DIASTOLIC BLOOD PRESSURE: 62 MMHG | HEART RATE: 60 BPM | OXYGEN SATURATION: 99 % | TEMPERATURE: 97.7 F | SYSTOLIC BLOOD PRESSURE: 122 MMHG

## 2022-05-27 DIAGNOSIS — I63.411 CEREBROVASCULAR ACCIDENT (CVA) DUE TO EMBOLISM OF RIGHT MIDDLE CEREBRAL ARTERY: ICD-10-CM

## 2022-05-27 DIAGNOSIS — Z95.2 H/O HEART VALVE REPLACEMENT WITH MECHANICAL VALVE: Primary | ICD-10-CM

## 2022-05-27 LAB — INR PPP: 3.2

## 2022-05-27 PROCEDURE — G0152 HHCP-SERV OF OT,EA 15 MIN: HCPCS

## 2022-05-27 PROCEDURE — G0299 HHS/HOSPICE OF RN EA 15 MIN: HCPCS

## 2022-05-27 NOTE — HOME HEALTH
Patient reports he is doing okay today.  He may be able to move his leg a little more.  Wife states he was waiting on PT to do exercises and walking today.  They are hopefully getting the new brace later today as they have been contacted by orthotic clinic.    Plan for Next Visit:  -If brace obtained, review use, donning/doffing and fit  -Continue gait training with stability, stride length and emaphasize patient looking ahead  -Continue with exercise program advancing standing exercises if now able with knee brace

## 2022-05-27 NOTE — HOME HEALTH
"SUBJECTIVE: \"We got his leg brace last night. We're still learning about it.\" \"I accidently double booked him for you and nursing. I'm sorry.\" No falls or medication changes reported.     PLAN FOR NEXT VISIT: ADLs, doff/don leg brace."

## 2022-05-27 NOTE — PROGRESS NOTES
Anticoagulation Clinic Progress Note    Anticoagulation Summary  As of 5/27/2022    INR goal:  3.0-3.5   TTR:  68.4 % (3.4 y)   INR used for dosing:  3.20 (5/27/2022)   Warfarin maintenance plan:  7.5 mg every Mon, Wed, Fri; 5 mg all other days   Weekly warfarin total:  42.5 mg   No change documented:  Natty Sullivan   Plan last modified:  Vasquez Niño, PharmD (12/16/2020)   Next INR check:  6/3/2022   Priority:  High   Target end date:  Indefinite    Indications    H/O heart valve replacement with mechanical valve [Z95.2]  Atrial fibrillation (HCC) [I48.91]  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    INR History:  Anticoagulation Monitoring 5/20/2022 5/23/2022 5/27/2022   INR 4.40 3.00 3.20   INR Date 5/20/2022 5/23/2022 5/27/2022   INR Goal 3.0-3.5 3.0-3.5 3.0-3.5   Trend Same Same Same   Last Week Total 47.5 mg 42.5 mg 37.5 mg   Next Week Total 37.5 mg 42.5 mg 42.5 mg   Sun 5 mg - 5 mg   Mon - 7.5 mg 7.5 mg   Tue - 5 mg 5 mg   Wed - 7.5 mg 7.5 mg   Thu - 5 mg 5 mg   Fri 2.5 mg (5/20) - 7.5 mg   Sat 5 mg - 5 mg   Visit Report - - -   Some recent data might be hidden       Plan:  1. INR is therapeutic today- see above in Anticoagulation Summary.    Francisco Sequeira to continue their warfarin regimen- see above in Anticoagulation Summary.  2. Follow up in 1 week  3. Pt has agreed to only be called if INR out of range. They have been instructed to call if any changes in medications, doses, concerns, etc. Patient expresses  understanding and has no further questions at this time.    Natty Sullivan

## 2022-05-31 ENCOUNTER — HOME CARE VISIT (OUTPATIENT)
Dept: HOME HEALTH SERVICES | Facility: HOME HEALTHCARE | Age: 87
End: 2022-05-31

## 2022-05-31 VITALS
HEART RATE: 60 BPM | SYSTOLIC BLOOD PRESSURE: 122 MMHG | TEMPERATURE: 97.7 F | OXYGEN SATURATION: 99 % | DIASTOLIC BLOOD PRESSURE: 62 MMHG | RESPIRATION RATE: 18 BRPM

## 2022-05-31 VITALS
DIASTOLIC BLOOD PRESSURE: 64 MMHG | SYSTOLIC BLOOD PRESSURE: 118 MMHG | HEART RATE: 71 BPM | TEMPERATURE: 97.7 F | OXYGEN SATURATION: 99 %

## 2022-05-31 VITALS
DIASTOLIC BLOOD PRESSURE: 70 MMHG | OXYGEN SATURATION: 98 % | SYSTOLIC BLOOD PRESSURE: 130 MMHG | HEART RATE: 90 BPM | RESPIRATION RATE: 18 BRPM | TEMPERATURE: 97.7 F

## 2022-05-31 PROCEDURE — G0151 HHCP-SERV OF PT,EA 15 MIN: HCPCS

## 2022-05-31 PROCEDURE — G0300 HHS/HOSPICE OF LPN EA 15 MIN: HCPCS

## 2022-05-31 PROCEDURE — G0152 HHCP-SERV OF OT,EA 15 MIN: HCPCS

## 2022-05-31 RX ORDER — PANTOPRAZOLE SODIUM 40 MG/1
TABLET, DELAYED RELEASE ORAL
Qty: 90 TABLET | Refills: 1 | OUTPATIENT
Start: 2022-05-31

## 2022-05-31 NOTE — HOME HEALTH
"Subjective: \"The brace rubbed his skin so he is supposed to give it a day and then try again to wear it.\"    Falls reported: none    Medication changes: none    Plan for next visit: Continue PT to assess new knee brace for proper fit, gait training with rolling walker with brace or immobilizer, transfer training, reinstruct and upgrade HEP adding reps as tolerated, and patient spouse education as needed"

## 2022-05-31 NOTE — HOME HEALTH
"SUBJECTIVE: \"We can't wear the brace today. Apparently I was putting it on wrong. I sent pictures to Edi and he said it was on him too low. Now he has a small sore so we're to take a break from it for a day or two.\" No falls or medication changes reported. Reporting no s/s of covid or covid exposures.    PLAN FOR NEXT VISIT: Standing at walker, practice moving one hand off, then the other (clothing mgmt.)"

## 2022-06-01 VITALS
SYSTOLIC BLOOD PRESSURE: 114 MMHG | HEART RATE: 89 BPM | TEMPERATURE: 98.2 F | RESPIRATION RATE: 18 BRPM | DIASTOLIC BLOOD PRESSURE: 62 MMHG | OXYGEN SATURATION: 99 %

## 2022-06-02 ENCOUNTER — HOME CARE VISIT (OUTPATIENT)
Dept: HOME HEALTH SERVICES | Facility: HOME HEALTHCARE | Age: 87
End: 2022-06-02

## 2022-06-02 VITALS
RESPIRATION RATE: 18 BRPM | DIASTOLIC BLOOD PRESSURE: 70 MMHG | TEMPERATURE: 97.5 F | HEART RATE: 71 BPM | SYSTOLIC BLOOD PRESSURE: 126 MMHG | OXYGEN SATURATION: 95 %

## 2022-06-02 VITALS
SYSTOLIC BLOOD PRESSURE: 116 MMHG | DIASTOLIC BLOOD PRESSURE: 66 MMHG | HEART RATE: 71 BPM | OXYGEN SATURATION: 99 % | TEMPERATURE: 97.1 F

## 2022-06-02 PROCEDURE — G0152 HHCP-SERV OF OT,EA 15 MIN: HCPCS

## 2022-06-02 PROCEDURE — G0151 HHCP-SERV OF PT,EA 15 MIN: HCPCS

## 2022-06-02 NOTE — HOME HEALTH
Patient has had galbladder out then developed a hematoma to his left leg which was aspirated at the hospital.  He is weak and has a wheelchair and walker, wife states he has lost 60 lbs.  CP assess

## 2022-06-02 NOTE — HOME HEALTH
"SUBJECTIVE: \"I think we got the pants down and up going good. He does it now, I watch.\"  No falls or medication changes reported.     PLAN FOR NEXT VISIT: IADLs with w/c or w/walker mobilities."

## 2022-06-02 NOTE — HOME HEALTH
"Subjective: \"We have an appt to get brace adjusted tomorrow,\" per spouse.    Falls reported: none    Medication changes: none    Plan for next visit:  Continue gait training assessing with new brace if able to wear after getting it adjusted, transfer training, reinstruct sitting/standing exercises with spouse training as needed, and patient spouse education as needed"

## 2022-06-03 ENCOUNTER — ANTICOAGULATION VISIT (OUTPATIENT)
Dept: PHARMACY | Facility: HOSPITAL | Age: 87
End: 2022-06-03

## 2022-06-03 ENCOUNTER — HOME CARE VISIT (OUTPATIENT)
Dept: HOME HEALTH SERVICES | Facility: HOME HEALTHCARE | Age: 87
End: 2022-06-03

## 2022-06-03 DIAGNOSIS — I63.411 CEREBROVASCULAR ACCIDENT (CVA) DUE TO EMBOLISM OF RIGHT MIDDLE CEREBRAL ARTERY: ICD-10-CM

## 2022-06-03 DIAGNOSIS — Z95.2 H/O HEART VALVE REPLACEMENT WITH MECHANICAL VALVE: Primary | ICD-10-CM

## 2022-06-03 LAB — INR PPP: 2.9

## 2022-06-03 PROCEDURE — G0300 HHS/HOSPICE OF LPN EA 15 MIN: HCPCS

## 2022-06-03 NOTE — PROGRESS NOTES
Anticoagulation Clinic Progress Note    Anticoagulation Summary  As of 6/3/2022    INR goal:  3.0-3.5   TTR:  68.2 % (3.4 y)   INR used for dosin.90 (6/3/2022)   Warfarin maintenance plan:  7.5 mg every Mon, Wed, Fri; 5 mg all other days   Weekly warfarin total:  42.5 mg   Plan last modified:  Vasquez Niño, PharmD (2020)   Next INR check:  6/10/2022   Priority:  High   Target end date:  Indefinite    Indications    H/O heart valve replacement with mechanical valve [Z95.2]  Atrial fibrillation (HCC) [I48.91]  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]             Anticoagulation Episode Summary     INR check location:      Preferred lab:      Send INR reminders to:  [facility]  POOL    Comments:  New INR goal 3 - 3.5 (see 2020 hospitalization for CVA)      Anticoagulation Care Providers     Provider Role Specialty Phone number    Griffin Fried MD Referring Cardiology [phone]          Clinic Interview:  Patient Findings     Negatives:  Signs/symptoms of thrombosis, Signs/symptoms of bleeding,   Laboratory test error suspected, Change in health, Change in alcohol use,   Change in activity, Upcoming invasive procedure, Emergency department   visit, Upcoming dental procedure, Missed doses, Extra doses, Change in   medications, Change in diet/appetite, Hospital admission, Bruising, Other   complaints      Clinical Outcomes     Negatives:  Major bleeding event, Thromboembolic event,   Anticoagulation-related hospital admission, Anticoagulation-related ED   visit, Anticoagulation-related fatality        INR History:  Anticoagulation Monitoring 2022 2022 6/3/2022   INR 3.00 3.20 2.90   INR Date 2022 2022 6/3/2022   INR Goal 3.0-3.5 3.0-3.5 3.0-3.5   Trend Same Same Same   Last Week Total 42.5 mg 37.5 mg 42.5 mg   Next Week Total 42.5 mg 42.5 mg 42.5 mg   Sun - 5 mg 5 mg   Mon 7.5 mg 7.5 mg 7.5 mg   Tue 5 mg 5 mg 5  mg   Wed 7.5 mg 7.5 mg 7.5 mg   Thu 5 mg 5 mg 5 mg   Fri - 7.5 mg 7.5 mg   Sat - 5 mg 5 mg   Visit Report - - -   Some recent data might be hidden       Plan:  1. INR is Subtherapeutic today- see above in Anticoagulation Summary.   Will instruct Francisco Sequeira to Continue their warfarin regimen- see above in Anticoagulation Summary.  2. Follow up in 1 weeks  3. Pt has agreed to only be called if INR out of range. They have been instructed to call if any changes in medications, doses, concerns, etc. Patient expresses understanding and has no further questions at this time.    Ron Wade [facility]

## 2022-06-05 VITALS
TEMPERATURE: 98.2 F | HEART RATE: 96 BPM | DIASTOLIC BLOOD PRESSURE: 66 MMHG | SYSTOLIC BLOOD PRESSURE: 132 MMHG | RESPIRATION RATE: 18 BRPM | OXYGEN SATURATION: 100 %

## 2022-06-06 ENCOUNTER — HOME CARE VISIT (OUTPATIENT)
Dept: HOME HEALTH SERVICES | Facility: HOME HEALTHCARE | Age: 87
End: 2022-06-06

## 2022-06-06 VITALS
TEMPERATURE: 97.7 F | HEART RATE: 78 BPM | DIASTOLIC BLOOD PRESSURE: 70 MMHG | SYSTOLIC BLOOD PRESSURE: 128 MMHG | OXYGEN SATURATION: 96 % | RESPIRATION RATE: 18 BRPM

## 2022-06-06 PROCEDURE — G0151 HHCP-SERV OF PT,EA 15 MIN: HCPCS

## 2022-06-06 PROCEDURE — G0180 MD CERTIFICATION HHA PATIENT: HCPCS | Performed by: NURSE PRACTITIONER

## 2022-06-06 NOTE — HOME HEALTH
"Subjective: \"They did adjust the brace last week; the only issue is that it will loosen and then it pinches him at times,\" per spouse.     Falls reported: none    Medication changes: none    Plan for next visit: Continue transfer training trying to get him to remember to lock brace prior to standing and unlock it before sitting down, gait pattern training with rolling walker wearing left knee brace, HEP instruction, and patient spouse education as needed"

## 2022-06-07 ENCOUNTER — HOME CARE VISIT (OUTPATIENT)
Dept: HOME HEALTH SERVICES | Facility: HOME HEALTHCARE | Age: 87
End: 2022-06-07

## 2022-06-07 VITALS
HEART RATE: 97 BPM | OXYGEN SATURATION: 97 % | SYSTOLIC BLOOD PRESSURE: 118 MMHG | DIASTOLIC BLOOD PRESSURE: 62 MMHG | TEMPERATURE: 99.5 F

## 2022-06-07 VITALS
DIASTOLIC BLOOD PRESSURE: 62 MMHG | TEMPERATURE: 97.5 F | HEART RATE: 81 BPM | SYSTOLIC BLOOD PRESSURE: 118 MMHG | OXYGEN SATURATION: 97 %

## 2022-06-07 PROCEDURE — G0300 HHS/HOSPICE OF LPN EA 15 MIN: HCPCS

## 2022-06-07 PROCEDURE — G0152 HHCP-SERV OF OT,EA 15 MIN: HCPCS

## 2022-06-07 NOTE — HOME HEALTH
"SUBJECTIVE: \"There's one strap on his brace that I don't think I have on right.\" (Strap she was referring to was on backwards. I switched it around and now the velcro adheres to the correct area.\" No falls, no edema noted, no medication changes reported.     PLAN FOR NEXT VISIT: OT d/c"

## 2022-06-08 ENCOUNTER — HOME CARE VISIT (OUTPATIENT)
Dept: HOME HEALTH SERVICES | Facility: HOME HEALTHCARE | Age: 87
End: 2022-06-08

## 2022-06-08 VITALS
OXYGEN SATURATION: 96 % | DIASTOLIC BLOOD PRESSURE: 66 MMHG | HEART RATE: 78 BPM | SYSTOLIC BLOOD PRESSURE: 118 MMHG | TEMPERATURE: 97.9 F | RESPIRATION RATE: 18 BRPM

## 2022-06-08 PROCEDURE — G0151 HHCP-SERV OF PT,EA 15 MIN: HCPCS

## 2022-06-08 NOTE — HOME HEALTH
"Subjective: \"I'm texting the brace nia because the brace still is sliding down at times and then it doesn't work as well,\" per spouse    Falls reported: none    Medication changes: none    Plan for next visit: Continue gait training with rolling walker with new knee brace reassessment, sit<>stand transfers with new knee brace with cues to lock/unlock it, HEP reinstruction sitting and standing positions, and patient spouse education as needed"

## 2022-06-09 ENCOUNTER — HOME CARE VISIT (OUTPATIENT)
Dept: HOME HEALTH SERVICES | Facility: HOME HEALTHCARE | Age: 87
End: 2022-06-09

## 2022-06-09 VITALS
DIASTOLIC BLOOD PRESSURE: 62 MMHG | OXYGEN SATURATION: 99 % | TEMPERATURE: 97.8 F | RESPIRATION RATE: 18 BRPM | SYSTOLIC BLOOD PRESSURE: 106 MMHG | HEART RATE: 100 BPM

## 2022-06-09 PROCEDURE — G0300 HHS/HOSPICE OF LPN EA 15 MIN: HCPCS

## 2022-06-09 NOTE — TELEPHONE ENCOUNTER
Rx Refill Note  Requested Prescriptions      No prescriptions requested or ordered in this encounter      Last office visit with prescribing clinician: 5/24/2022      Next office visit with prescribing clinician: Visit date not found            Amirah Coleman MA  06/09/22, 14:35 EDT

## 2022-06-10 ENCOUNTER — ANTICOAGULATION VISIT (OUTPATIENT)
Dept: PHARMACY | Facility: HOSPITAL | Age: 87
End: 2022-06-10

## 2022-06-10 ENCOUNTER — HOME CARE VISIT (OUTPATIENT)
Dept: HOME HEALTH SERVICES | Facility: HOME HEALTHCARE | Age: 87
End: 2022-06-10

## 2022-06-10 DIAGNOSIS — Z95.2 H/O HEART VALVE REPLACEMENT WITH MECHANICAL VALVE: Primary | ICD-10-CM

## 2022-06-10 DIAGNOSIS — I63.411 CEREBROVASCULAR ACCIDENT (CVA) DUE TO EMBOLISM OF RIGHT MIDDLE CEREBRAL ARTERY: ICD-10-CM

## 2022-06-10 LAB — INR PPP: 2.9

## 2022-06-10 PROCEDURE — G0152 HHCP-SERV OF OT,EA 15 MIN: HCPCS

## 2022-06-10 NOTE — PROGRESS NOTES
Anticoagulation Clinic Progress Note    Anticoagulation Summary  As of 6/10/2022    INR goal:  3.0-3.5   TTR:  67.9 % (3.4 y)   INR used for dosin.90 (6/10/2022)   Warfarin maintenance plan:  5 mg every Sun, Tue, Thu; 7.5 mg all other days   Weekly warfarin total:  45 mg   Plan last modified:  Vasquez Niño, PharmD (6/10/2022)   Next INR check:  2022   Priority:  High   Target end date:  Indefinite    Indications    H/O heart valve replacement with mechanical valve [Z95.2]  Atrial fibrillation (HCC) [I48.91]  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]             Anticoagulation Episode Summary     INR check location:      Preferred lab:      Send INR reminders to:  [facility]  POOL    Comments:  New INR goal 3 - 3.5 (see 2020 hospitalization for CVA)      Anticoagulation Care Providers     Provider Role Specialty Phone number    Griffin Fried MD Referring Cardiology [phone]          Clinic Interview:  Patient Findings     Negatives:  Signs/symptoms of thrombosis, Signs/symptoms of bleeding,   Laboratory test error suspected, Change in health, Change in alcohol use,   Change in activity, Upcoming invasive procedure, Emergency department   visit, Upcoming dental procedure, Missed doses, Extra doses, Change in   medications, Change in diet/appetite, Hospital admission, Bruising, Other   complaints      Clinical Outcomes     Negatives:  Major bleeding event, Thromboembolic event,   Anticoagulation-related hospital admission, Anticoagulation-related ED   visit, Anticoagulation-related fatality        INR History:  Anticoagulation Monitoring 2022 6/3/2022 6/10/2022   INR 3.20 2.90 2.90   INR Date 2022 6/3/2022 6/10/2022   INR Goal 3.0-3.5 3.0-3.5 3.0-3.5   Trend Same Same Up   Last Week Total 37.5 mg 42.5 mg 42.5 mg   Next Week Total 42.5 mg 42.5 mg 45 mg   Sun 5 mg 5 mg 5 mg   Mon 7.5 mg 7.5 mg 7.5 mg   Tue 5 mg 5 mg 5 mg    Wed 7.5 mg 7.5 mg 7.5 mg   Thu 5 mg 5 mg 5 mg   Fri 7.5 mg 7.5 mg 7.5 mg   Sat 5 mg 5 mg 7.5 mg   Visit Report - - -   Some recent data might be hidden       Plan:  1. INR is Subtherapeutic today- see above in Anticoagulation Summary.   Will instruct Francisco Sequeira to Increase their warfarin regimen- see above in Anticoagulation Summary.  2. Follow up in 1 week  3. They have been instructed to call if any changes in medications, doses, concerns, etc. Patient expresses understanding and has no further questions at this time.    Vasquez Niño, PharmD

## 2022-06-12 VITALS
HEART RATE: 71 BPM | TEMPERATURE: 97.6 F | DIASTOLIC BLOOD PRESSURE: 62 MMHG | SYSTOLIC BLOOD PRESSURE: 118 MMHG | OXYGEN SATURATION: 97 %

## 2022-06-14 ENCOUNTER — HOME CARE VISIT (OUTPATIENT)
Dept: HOME HEALTH SERVICES | Facility: HOME HEALTHCARE | Age: 87
End: 2022-06-14

## 2022-06-14 VITALS
TEMPERATURE: 98.4 F | OXYGEN SATURATION: 99 % | SYSTOLIC BLOOD PRESSURE: 122 MMHG | RESPIRATION RATE: 18 BRPM | DIASTOLIC BLOOD PRESSURE: 68 MMHG | HEART RATE: 76 BPM

## 2022-06-14 VITALS
TEMPERATURE: 97.7 F | HEART RATE: 63 BPM | SYSTOLIC BLOOD PRESSURE: 126 MMHG | DIASTOLIC BLOOD PRESSURE: 72 MMHG | RESPIRATION RATE: 18 BRPM | OXYGEN SATURATION: 98 %

## 2022-06-14 PROCEDURE — G0300 HHS/HOSPICE OF LPN EA 15 MIN: HCPCS

## 2022-06-14 PROCEDURE — G0151 HHCP-SERV OF PT,EA 15 MIN: HCPCS

## 2022-06-14 NOTE — HOME HEALTH
"Subjective: \"I will try to make sign for walker to lock brace;\" per spouse.    Falls reported: none    Medication changes: none    Plan for next visit: 30 day reassessment, continued gait with rolling walker and left knee brace, transfer training, HEP reinstruction and patient/spouse education as needed"

## 2022-06-15 NOTE — HOME HEALTH
Patient has met goals and is ready for D/C.  Form is signed and in folder.  Patient and caregiver are in agreement with D/C.

## 2022-06-16 ENCOUNTER — TELEPHONE (OUTPATIENT)
Dept: FAMILY MEDICINE CLINIC | Facility: CLINIC | Age: 87
End: 2022-06-16

## 2022-06-16 ENCOUNTER — HOME CARE VISIT (OUTPATIENT)
Dept: HOME HEALTH SERVICES | Facility: HOME HEALTHCARE | Age: 87
End: 2022-06-16

## 2022-06-16 VITALS
RESPIRATION RATE: 18 BRPM | HEART RATE: 64 BPM | DIASTOLIC BLOOD PRESSURE: 64 MMHG | OXYGEN SATURATION: 100 % | TEMPERATURE: 97.5 F | SYSTOLIC BLOOD PRESSURE: 120 MMHG

## 2022-06-16 PROCEDURE — G0151 HHCP-SERV OF PT,EA 15 MIN: HCPCS

## 2022-06-16 NOTE — TELEPHONE ENCOUNTER
[facility]     Best call back number: [phone]    What orders are you requesting (i.e. lab or imaging): CONTINUE PT FOR 2 TIMES A WEEK FOR 2 MORE WEEKS     In what timeframe would the patient need to come in: ASAP

## 2022-06-16 NOTE — HOME HEALTH
"Subjective:  \"He's doing somewhat better remembering to lock brace before standing with the sign on his walker,\" per spouse    Falls reported: none    Medication changes: none    30 DAY REASSESSMENT COMPLETED-SEE ASSESSMENTS FOR DETAILS      Plan for next visit: Extend home PT 2wk2 for further gait pattern training with rolling walker, sit<>stand transfer training, HEP standing strength exercises, and patient spouse education; will also look into setting outpatient PT up in 3 weeks at [facility]"

## 2022-06-17 ENCOUNTER — ANTICOAGULATION VISIT (OUTPATIENT)
Dept: PHARMACY | Facility: HOSPITAL | Age: 87
End: 2022-06-17

## 2022-06-17 DIAGNOSIS — I63.411 CEREBROVASCULAR ACCIDENT (CVA) DUE TO EMBOLISM OF RIGHT MIDDLE CEREBRAL ARTERY: ICD-10-CM

## 2022-06-17 DIAGNOSIS — Z95.2 H/O HEART VALVE REPLACEMENT WITH MECHANICAL VALVE: Primary | ICD-10-CM

## 2022-06-17 LAB — INR PPP: 2.3

## 2022-06-17 NOTE — TELEPHONE ENCOUNTER
Also I made home health aware that pt has switches care to aliza English and has his first appointment with new provider on august 26th

## 2022-06-17 NOTE — PROGRESS NOTES
Anticoagulation Clinic Progress Note    Anticoagulation Summary  As of 2022    INR goal:  3.0-3.5   TTR:  67.5 % (3.4 y)   INR used for dosin.30 (2022)   Warfarin maintenance plan:  5 mg every Sun, Tue, Thu; 7.5 mg all other days   Weekly warfarin total:  45 mg   Plan last modified:  Vasquez Niño, PharmD (6/10/2022)   Next INR check:  2022   Priority:  High   Target end date:  Indefinite    Indications    H/O heart valve replacement with mechanical valve [Z95.2]  Atrial fibrillation (HCC) [I48.91]  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]             Anticoagulation Episode Summary     INR check location:      Preferred lab:      Send INR reminders to:  [facility]  POOL    Comments:  New INR goal 3 - 3.5 (see 2020 hospitalization for CVA)      Anticoagulation Care Providers     Provider Role Specialty Phone number    Griffin Fried MD Referring Cardiology [phone]          Clinic Interview:  Patient Findings     Negatives:  Signs/symptoms of thrombosis, Signs/symptoms of bleeding,   Laboratory test error suspected, Change in health, Change in alcohol use,   Change in activity, Upcoming invasive procedure, Emergency department   visit, Upcoming dental procedure, Missed doses, Extra doses, Change in   medications, Change in diet/appetite, Hospital admission, Bruising, Other   complaints      Clinical Outcomes     Negatives:  Major bleeding event, Thromboembolic event,   Anticoagulation-related hospital admission, Anticoagulation-related ED   visit, Anticoagulation-related fatality        INR History:  Anticoagulation Monitoring 6/3/2022 6/10/2022 2022   INR 2.90 2.90 2.30   INR Date 6/3/2022 6/10/2022 2022   INR Goal 3.0-3.5 3.0-3.5 3.0-3.5   Trend Same Up Same   Last Week Total 42.5 mg 42.5 mg 45 mg   Next Week Total 42.5 mg 45 mg 47.5 mg   Sun 5 mg 5 mg 7.5 mg ()   Mon 7.5 mg 7.5 mg 7.5 mg   Tue 5 mg 5  mg 5 mg   Wed 7.5 mg 7.5 mg 7.5 mg   Thu 5 mg 5 mg 5 mg   Fri 7.5 mg 7.5 mg 7.5 mg   Sat 5 mg 7.5 mg 7.5 mg   Visit Report - - -   Some recent data might be hidden       Plan:  1. INR is Subtherapeutic today- see above in Anticoagulation Summary.   Will instruct Francisco Sequeira to Increase their warfarin regimen- see above in Anticoagulation Summary.  Take 5mg on Tuesday and Thursday only, then 7.5mg on all other days until next INR.  2. Follow up in 1 weeks  3. They have been instructed to call if any changes in medications, doses, concerns, etc. Patient expresses understanding and has no further questions at this time.    Carmen El, PharmD

## 2022-06-21 ENCOUNTER — HOME CARE VISIT (OUTPATIENT)
Dept: HOME HEALTH SERVICES | Facility: HOME HEALTHCARE | Age: 87
End: 2022-06-21

## 2022-06-21 VITALS
TEMPERATURE: 98.3 F | RESPIRATION RATE: 20 BRPM | HEART RATE: 76 BPM | SYSTOLIC BLOOD PRESSURE: 132 MMHG | DIASTOLIC BLOOD PRESSURE: 60 MMHG

## 2022-06-21 VITALS
HEART RATE: 80 BPM | DIASTOLIC BLOOD PRESSURE: 70 MMHG | OXYGEN SATURATION: 98 % | SYSTOLIC BLOOD PRESSURE: 122 MMHG | RESPIRATION RATE: 18 BRPM | TEMPERATURE: 97.5 F

## 2022-06-21 PROCEDURE — G0299 HHS/HOSPICE OF RN EA 15 MIN: HCPCS

## 2022-06-21 PROCEDURE — G0151 HHCP-SERV OF PT,EA 15 MIN: HCPCS

## 2022-06-21 NOTE — HOME HEALTH
"Subjective: \"I'll contact the orthotist,\" per spouse    Falls reported: none    Medication changes: none      Plan for next visit: Continue gait training assessing knee brace for causing irritation/skin tear on tibial tuberosity, transfer training with brace or immobilizer, further HEP training for left leg motor control and stance leg strengthening, and patient spouse education as needed"

## 2022-06-22 NOTE — HOME HEALTH
[facility]   all goals met   d/c instructions given to patient

## 2022-06-23 ENCOUNTER — HOME CARE VISIT (OUTPATIENT)
Dept: HOME HEALTH SERVICES | Facility: HOME HEALTHCARE | Age: 87
End: 2022-06-23

## 2022-06-23 VITALS
OXYGEN SATURATION: 98 % | RESPIRATION RATE: 18 BRPM | TEMPERATURE: 97.7 F | SYSTOLIC BLOOD PRESSURE: 126 MMHG | HEART RATE: 82 BPM | DIASTOLIC BLOOD PRESSURE: 60 MMHG

## 2022-06-23 PROCEDURE — G0151 HHCP-SERV OF PT,EA 15 MIN: HCPCS

## 2022-06-23 NOTE — HOME HEALTH
"Subjective: Per spouse: \"I'm learning different ways to make sure gets a good fit on the brace. I still have a bandaid on the bony scab from other day and he did not complain yesterday wearing the brace.\"    Falls reported: none    Medication changes: none    Plan for next visit: Continue gait training with rolling walker with knee brace, transfer training working on locking/unlocking\" brace for sit<>stand, HEP training with knee brace or immobilizer in standing, and patient spouse education"

## 2022-06-24 ENCOUNTER — ANTICOAGULATION VISIT (OUTPATIENT)
Dept: PHARMACY | Facility: HOSPITAL | Age: 87
End: 2022-06-24

## 2022-06-24 DIAGNOSIS — Z95.2 H/O HEART VALVE REPLACEMENT WITH MECHANICAL VALVE: Primary | ICD-10-CM

## 2022-06-24 DIAGNOSIS — I63.411 CEREBROVASCULAR ACCIDENT (CVA) DUE TO EMBOLISM OF RIGHT MIDDLE CEREBRAL ARTERY: ICD-10-CM

## 2022-06-24 LAB — INR PPP: 3

## 2022-06-24 NOTE — PROGRESS NOTES
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]             Anticoagulation Episode Summary     INR check location:      Preferred lab:      Send INR reminders to:  [facility]  POOL    Comments:  New INR goal 3 - 3.5 (see 1/2020 hospitalization for CVA)      Anticoagulation Care Providers     Provider Role Specialty Phone number    Griffin Fried MD Referring Cardiology [phone]          Clinic Interview:      INR History:  Anticoagulation Monitoring 6/10/2022 6/17/2022 6/24/2022   INR 2.90 2.30 3.00   INR Date 6/10/2022 6/17/2022 6/24/2022   INR Goal 3.0-3.5 3.0-3.5 3.0-3.5   Trend Up Same Up   Last Week Total 42.5 mg 45 mg 47.5 mg   Next Week Total 45 mg 47.5 mg 47.5 mg   Sun 5 mg 7.5 mg (6/19) 7.5 mg   Mon 7.5 mg 7.5 mg 7.5 mg   Tue 5 mg 5 mg 5 mg   Wed 7.5 mg 7.5 mg 7.5 mg   Thu 5 mg 5 mg 5 mg   Fri 7.5 mg 7.5 mg 7.5 mg   Sat 7.5 mg 7.5 mg 7.5 mg   Visit Report - - -   Some recent data might be hidden       Plan:  1. INR is Therapeutic today- see above in Anticoagulation Summary.   Will instruct Francisco Sequeira to Continue their warfarin regimen- see above in Anticoagulation Summary.  Take 7.5mg daily, except take 5mg on Tuesday and Thursday.   2. Follow up in 1 week.  3. They have been instructed to call if any changes in medications, doses, concerns, etc. Patient expresses understanding and has no further questions at this  time.    Carmen El, PharmD

## 2022-06-27 ENCOUNTER — HOME CARE VISIT (OUTPATIENT)
Dept: HOME HEALTH SERVICES | Facility: HOME HEALTHCARE | Age: 87
End: 2022-06-27

## 2022-06-27 VITALS
DIASTOLIC BLOOD PRESSURE: 70 MMHG | RESPIRATION RATE: 18 BRPM | SYSTOLIC BLOOD PRESSURE: 130 MMHG | TEMPERATURE: 97.7 F | OXYGEN SATURATION: 100 % | HEART RATE: 73 BPM

## 2022-06-27 PROCEDURE — G0151 HHCP-SERV OF PT,EA 15 MIN: HCPCS

## 2022-06-27 NOTE — HOME HEALTH
"Subjective: \"We used the immobilizer this weekend to give leg/skin a break and I'll do that with him periodically so his skin doesn't tear,\" per spouse    Falls reported: none    Medication changes: none    Plan for next visit: dc assessment and instructions if outpatient PT set up to start next week (message left with [facility] last week/awaiting callback to schedule), gait and transfer training with knee brace or immobilizer, final HEP instruction and patient spouse education as needed"

## 2022-06-29 DIAGNOSIS — S70.12XA HEMATOMA OF ILIOPSOAS MUSCLE, LEFT, INITIAL ENCOUNTER: Primary | ICD-10-CM

## 2022-07-01 ENCOUNTER — HOME CARE VISIT (OUTPATIENT)
Dept: HOME HEALTH SERVICES | Facility: HOME HEALTHCARE | Age: 87
End: 2022-07-01

## 2022-07-01 ENCOUNTER — ANTICOAGULATION VISIT (OUTPATIENT)
Dept: PHARMACY | Facility: HOSPITAL | Age: 87
End: 2022-07-01

## 2022-07-01 VITALS
TEMPERATURE: 97.5 F | RESPIRATION RATE: 18 BRPM | DIASTOLIC BLOOD PRESSURE: 70 MMHG | SYSTOLIC BLOOD PRESSURE: 128 MMHG | HEART RATE: 90 BPM | OXYGEN SATURATION: 97 %

## 2022-07-01 DIAGNOSIS — Z95.2 H/O HEART VALVE REPLACEMENT WITH MECHANICAL VALVE: Primary | ICD-10-CM

## 2022-07-01 DIAGNOSIS — I63.411 CEREBROVASCULAR ACCIDENT (CVA) DUE TO EMBOLISM OF RIGHT MIDDLE CEREBRAL ARTERY: ICD-10-CM

## 2022-07-01 LAB — INR PPP: 2.8

## 2022-07-01 PROCEDURE — G0151 HHCP-SERV OF PT,EA 15 MIN: HCPCS

## 2022-07-01 NOTE — PROGRESS NOTES
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]             Anticoagulation Episode Summary     INR check location:      Preferred lab:      Send INR reminders to:  [facility]  POOL    Comments:  New INR goal 3 - 3.5 (see 2020 hospitalization for CVA)      Anticoagulation Care Providers     Provider Role Specialty Phone number    Griffin Fried MD Referring Cardiology [phone]          Clinic Interview:  Patient Findings     Negatives:  Signs/symptoms of thrombosis, Signs/symptoms of bleeding,   Laboratory test error suspected, Change in health, Change in alcohol use,   Change in activity, Upcoming invasive procedure, Emergency department   visit, Upcoming dental procedure, Missed doses, Extra doses, Change in   medications, Change in diet/appetite, Hospital admission, Bruising, Other   complaints      Clinical Outcomes     Negatives:  Major bleeding event, Thromboembolic event,   Anticoagulation-related hospital admission, Anticoagulation-related ED   visit, Anticoagulation-related fatality        INR History:  Anticoagulation Monitoring 2022   INR 2.30 3.00 2.80   INR Date 2022   INR Goal 3.0-3.5 3.0-3.5 3.0-3.5   Trend Same Up Same   Last Week Total 45 mg 47.5 mg 47.5 mg   Next Week Total 47.5 mg 47.5 mg 50 mg   Sun 7.5 mg () 7.5 mg 7.5 mg   Mon 7.5 mg 7.5 mg 7.5 mg   Tue 5 mg 5  mg 5 mg   Wed 7.5 mg 7.5 mg 7.5 mg   Thu 5 mg 5 mg 5 mg   Fri 7.5 mg 7.5 mg 10 mg (7/1)   Sat 7.5 mg 7.5 mg 7.5 mg   Visit Report - - -   Some recent data might be hidden       Plan:  1. INR is Subtherapeutic today- see above in Anticoagulation Summary.   Will instruct Francisco Sequeira to Change their warfarin regimen- see above in Anticoagulation Summary.  2. Follow up in 1 week  3. They have been instructed to call if any changes in medications, doses, concerns, etc. Patient expresses understanding and has no further questions at this time.    Vasquez Niño, PharmD

## 2022-07-05 RX ORDER — DIGOXIN 0.06 MG/1
62.5 TABLET ORAL
Qty: 30 TABLET
Start: 2022-07-05 | End: 2023-03-10 | Stop reason: DRUGHIGH

## 2022-07-08 ENCOUNTER — ANTICOAGULATION VISIT (OUTPATIENT)
Dept: PHARMACY | Facility: HOSPITAL | Age: 87
End: 2022-07-08

## 2022-07-08 DIAGNOSIS — Z95.2 H/O HEART VALVE REPLACEMENT WITH MECHANICAL VALVE: Primary | ICD-10-CM

## 2022-07-08 DIAGNOSIS — I63.411 CEREBROVASCULAR ACCIDENT (CVA) DUE TO EMBOLISM OF RIGHT MIDDLE CEREBRAL ARTERY: ICD-10-CM

## 2022-07-08 LAB — INR PPP: 3

## 2022-07-08 NOTE — PROGRESS NOTES
Anticoagulation Clinic Progress Note    Anticoagulation Summary  As of 7/8/2022    INR goal:  3.0-3.5   TTR:  66.7 % (3.5 y)   INR used for dosing:  3.00 (7/8/2022)   Warfarin maintenance plan:  5 mg every Thu; 7.5 mg all other days   Weekly warfarin total:  50 mg   Plan last modified:  Michelle Piña RPH (7/8/2022)   Next INR check:  7/15/2022   Priority:  High   Target end date:  Indefinite    Indications    H/O heart valve replacement with mechanical valve [Z95.2]  Atrial fibrillation (HCC) [I48.91]  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]             Anticoagulation Episode Summary     INR check location:      Preferred lab:      Send INR reminders to:  [facility]  POOL    Comments:  New INR goal 3 - 3.5 (see 1/2020 hospitalization for CVA)      Anticoagulation Care Providers     Provider Role Specialty Phone number    Griffin Fried MD Referring Cardiology [phone]          Clinic Interview:  Patient Findings     Negatives:  Signs/symptoms of thrombosis, Signs/symptoms of bleeding,   Laboratory test error suspected, Change in health, Change in alcohol use,   Change in activity, Upcoming invasive procedure, Emergency department   visit, Upcoming dental procedure, Missed doses, Extra doses, Change in   medications, Change in diet/appetite, Hospital admission, Bruising, Other   complaints      Clinical Outcomes     Negatives:  Major bleeding event, Thromboembolic event,   Anticoagulation-related hospital admission, Anticoagulation-related ED   visit, Anticoagulation-related fatality        INR History:  Anticoagulation Monitoring 6/24/2022 7/1/2022 7/8/2022   INR 3.00 2.80 3.00   INR Date 6/24/2022 7/1/2022 7/8/2022   INR Goal 3.0-3.5 3.0-3.5 3.0-3.5   Trend Up Same Up   Last Week Total 47.5 mg 47.5 mg 50 mg   Next Week Total 47.5 mg 50 mg 50 mg   Sun 7.5 mg 7.5 mg 7.5 mg   Mon 7.5 mg 7.5 mg 7.5 mg   Tue 5 mg 5 mg 7.5 mg   Wed 7.5 mg  7.5 mg 7.5 mg   Thu 5 mg 5 mg 5 mg   Fri 7.5 mg 10 mg (7/1) 7.5 mg   Sat 7.5 mg 7.5 mg 7.5 mg   Visit Report - - -   Some recent data might be hidden       Plan:  1. INR is Therapeutic today- see above in Anticoagulation Summary.   Will instruct Francisco Sequeira to Change their warfarin regimen (Same Total Weekly Dose)- see above in Anticoagulation Summary.  2. Follow up in 1 weeks  3. They have been instructed to call if any changes in medications, doses, concerns, etc. Patient expresses understanding and has no further questions at this time.    Michelle Piña, [facility]

## 2022-07-11 ENCOUNTER — HOSPITAL ENCOUNTER (OUTPATIENT)
Dept: PHYSICAL THERAPY | Facility: HOSPITAL | Age: 87
Setting detail: THERAPIES SERIES
Discharge: HOME | End: 2022-07-11

## 2022-07-11 DIAGNOSIS — S70.12XA HEMATOMA OF ILIOPSOAS MUSCLE, LEFT, INITIAL ENCOUNTER: Primary | ICD-10-CM

## 2022-07-11 DIAGNOSIS — R26.89 FUNCTIONAL GAIT ABNORMALITY: ICD-10-CM

## 2022-07-11 DIAGNOSIS — M62.81 QUADRICEPS WEAKNESS: ICD-10-CM

## 2022-07-11 DIAGNOSIS — Z91.81 RISK FOR FALLS: ICD-10-CM

## 2022-07-11 PROCEDURE — 97162 PT EVAL MOD COMPLEX 30 MIN: CPT

## 2022-07-11 NOTE — THERAPY EVALUATION
.Outpatient Physical Therapy Neuro Initial Evaluation  [facility]     Patient Name: Francisco Sequiera  : 1935  MRN: [id number]  Today's Date: 2022      Visit Date: 2022    Patient Active Problem List   Diagnosis   • Atrial fibrillation (HCC)   • Hypertension   • Atopic rhinitis   • Gastroesophageal reflux disease   • Hyperlipidemia   • Type 2 diabetes mellitus (HCC)   • Low testosterone   • H/O heart valve replacement with mechanical valve   • Kidney carcinoma (HCC)   • Stage 3b chronic kidney disease (HCC)   • YBRON (acute kidney injury) (HCC)   • 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)   • Iron deficiency anemia   • Chronic anticoagulation   • Hemiparesis of left nondominant side as late effect of cerebral infarction (HCC)   • Rectus sheath hematoma   • Hospital discharge follow-up   • Sepsis (HCC)   • Calculus of gallbladder with acute cholecystitis without obstruction   • History of Clostridium difficile infection   • Aspiration pneumonitis (HCC)   • Hematoma of iliopsoas muscle, left, initial encounter   • History of CVA (cerebrovascular accident)   • S/P laparoscopic cholecystectomy   • Anemia   • Hematoma of left iliopsoas muscle        Past Medical History:   Diagnosis Date   • Allergic rhinitis    • Aortic valve insufficiency    • Ascending aortic aneurysm (HCC)    • Atrial fibrillation (HCC)    • Bacteremia    • Calcific aortic stenosis of bicuspid valve    • Cardiac arrest (HCC)    • Cardiomyopathy (HCC)    • CKD (chronic kidney disease) stage 3, GFR 30-59 ml/min (HCC)    • Contact dermatitis due to poison ivy    • Elbow fracture    • Esophageal reflux    • GERD (gastroesophageal reflux disease)    • Head injury    • History of transfusion    • Hyperglycemia    • Hyperlipidemia    • Hypertension    • Kidney carcinoma (HCC)    • Nonischemic cardiomyopathy (HCC)    • Renal oncocytoma    • Seasonal allergic reaction    • Sinusitis    •  Syncope    • Type 2 diabetes mellitus (HCC)     uncontrolled   • Visual field defect         Past Surgical History:   Procedure Laterality Date   • AORTIC VALVE REPAIR/REPLACEMENT     • ASCENDING AORTIC ANEURYSM REPAIR W/ MECHANICAL AORTIC VALVE REPLACEMENT     • CHOLECYSTECTOMY WITH INTRAOPERATIVE CHOLANGIOGRAM N/A 3/29/2022    Procedure: Laparoscopic cholecystectomy with cholangiogram, possible open;  Surgeon: Lisa Guidry MD;  Location: [facility] MAIN OR;  Service: General;  Laterality: N/A;   • COLONOSCOPY N/A 10/28/2021    Procedure: COLONOSCOPY to cecum:  cold snare polyps,;  Surgeon: Dinesh Meza MD;  Location: [facility] ENDOSCOPY;  Service: Gastroenterology;  Laterality: N/A;  pre:  Iron deficiency anemia  post:  polyps, diverticulosis,    • COLONOSCOPY N/A 11/9/2021    Procedure: COLONOSCOPY to cecum with APC cautery of AVM and clip placement x1;  Surgeon: Pavan Rodriguez MD;  Location: [facility] ENDOSCOPY;  Service: Gastroenterology;  Laterality: N/A;  PRE - gi bleed, anemia  POST - diverticulosis, poor prep, AVM right colon   • ENDOSCOPY N/A 10/28/2021    Procedure: ESOPHAGOGASTRODUODENOSCOPY with biopsies;  Surgeon: Dinesh Meza MD;  Location: [facility] ENDOSCOPY;  Service: Gastroenterology;  Laterality: N/A;  pre:  Iron deficiency anemia  post:  duodenitis and gastritis   • EYE SURGERY  12/09/2020    cataract surgery    • NEPHRECTOMY     • OTHER SURGICAL HISTORY      elbow surgery   • OTHER SURGICAL HISTORY      right arm surgery   • PROSTATE SURGERY     • THORACENTESIS Left     diagnostic         Visit Dx:     ICD-10-CM ICD-9-CM   1. Hematoma of iliopsoas muscle, left, initial encounter  S70.12XA 924.00   2. Quadriceps weakness  M62.81 728.87   3. Functional gait abnormality  R26.89 781.2   4. Risk for falls  Z91.81 V15.88        Patient History     Row Name 07/11/22 1600             History    Chief Complaint Difficulty Walking;Muscle weakness  -LB      Date Current Problem(s) Began  04/06/22  -LB      Brief Description of Current Complaint Pt is 87 y/o male s/p lap teja and was discharged on blod thinners.  Pt developed left LE weakness and was found to have left iliopsoas muscle hematoma.  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  Pt currently wearing a KI for left quad weakness as his dynamic knee extension brace is not fitting well and caused skin breakdown  -LB              Fall Risk Assessment    Any falls in the past year: No  -LB              Services    Prior Rehab/Home Health Experiences Yes  -LB      Where was the prior experience with Rehab/Home Health [facility] rehab earlier this year  -LB      Are you currently receiving Home Health services No  -LB      Do you plan to receive Home Health services in the near future No  -LB              Daily Activities    Pt Participated in POC and Goals Yes  -LB              Safety    Are you being hurt, hit, or frightened by anyone at home or in your life? No  -LB      Are you being neglected by a caregiver No  -LB      Have you had any of the following issues with N/A  -LB            User Key  (r) = Recorded By, (t) = Taken By, (c) = Cosigned By    Initials Name Provider Type    Nalini Grey PT Physical Therapist                     PT Neuro     Row Name 07/11/22 1600             Subjective Comments    Subjective Comments I do my exercises at least 2 times a day.  I do my arm exercises and my leg exercises  -LB              Precautions and Contraindications    Precautions/Limitations fall precautions  -LB      Precautions Significant quad weakness, must wear brace with standing/ambulation activities  -LB              Subjective Pain    Able to rate subjective pain? yes  -LB      Pre-Treatment Pain Level 0  -LB      Post-Treatment Pain Level 0  -LB              Home Living    Current Living Arrangements home  -LB      Home Accessibility stairs to enter home  -LB      Number of Stairs, Main Entrance  two  -LB      Stair Railings, Main Entrance railings safe and in good condition  -LB      Home Equipment Rolling walker;Wheelchair-manual  transport wheelchair  -LB              Vision-Basic Assessment    Current Vision Wears glasses all the time  -LB              Cognition    Overall Cognitive Status WFL  -LB      Arousal/Alertness Appropriate responses to stimuli  -LB      Memory Decreased recall of recent events  -LB      Orientation Level Oriented to situation  -LB      Safety Judgment Good awareness of safety precautions  -LB      Deficits Fully aware of deficits  -LB              Sensation    Sensation WNL? WFL  -LB      Light Touch No apparent deficits  -LB              Posture/Observations    Alignment Options Forward head;Rounded shoulders  -LB      Forward Head Moderate  -LB      Rounded Shoulders Moderate  -LB      Posture/Observations Comments PT arrived to [facility] in transport wheelchair.  Spouse present  -LB              General ROM    GENERAL ROM COMMENTS Right and Left LE ROM: WFL  -LB              MMT (Manual Muscle Testing)    Rt Lower Ext Rt Hip WFL;Rt Knee WFL;Rt Ankle WFL  -LB      Lt Lower Ext Lt Ankle WFL;Lt Hip Flexion;Lt Knee Extension;Lt Knee Flexion  -LB              MMT Left Lower Ext    Lt Hip Flexion MMT, Gross Movement (2/5) poor  -LB      Lt Knee Extension MMT, Gross Movement (1/5) trace  -LB      Lt Knee Flexion MMT, Gross Movement (4/5) good  -LB              Bed Mobility    Bed Mobility supine-sit;sit-supine  -LB      Supine-Sit Starr (Bed Mobility) minimum assist (75% patient effort)  -LB      Sit-Supine [place] (Bed Mobility) minimum assist (75% patient effort)  -LB              Transfers    Sit-Stand [place] (Transfers) verbal cues;contact guard  -LB      Stand-Sit Starr (Transfers) verbal cues;minimum assist (75% patient effort)  -LB      Transfers, Sit-Stand-Sit, Assist Device rolling walker  left KI  -LB      Comment, (Transfers) pt using UEs  significantly through UEs to push up to stand.  Assist needed to control the descent and cues to step left LE forward as knee cannnot bend in KI  -LB              Gait/Stairs (Locomotion)    [place] Level (Gait) contact guard  -LB      Assistive Device (Gait) walker, front-wheeled  KI  -LB      Distance in Feet (Gait) 80; 50 x 2  -LB      Pattern (Gait) step-through;step-to  -LB      Deviations/Abnormal Patterns (Gait) bilateral deviations;base of support, narrow;gait speed decreased;stride length decreased  -LB      Bilateral Gait Deviations forward flexed posture;heel strike decreased;weight shift ability decreased  -LB      Left Sided Gait Deviations knee buckling, left side  -LB      Comment, (Gait/Stairs) Knee Immob ued due to knee buckling  -LB              Curb Negotiation (Mobility)    [place], Curb Negotiation verbal cues;contact guard;2 person assist  -LB      Assistive Device (Curb Negotiation) walker, front-wheeled  KI  -LB      Comment, Curb Negotiation (Mobility) cues for sequence and walker placement  -LB              Balance Skills Training    Balance Comments see TInetti and TUG  -LB            User Key  (r) = Recorded By, (t) = Taken By, (c) = Cosigned By    Initials Name Provider Type    Nalini Grey, PT Physical Therapist                        Therapy Education  Education Details: sit to stand  Given: Mobility training  Program: New  How Provided: Verbal, Demonstration  Provided to: Caregiver, Patient  Level of Understanding: Teach back education performed, Verbalized     PT OP Goals     Row Name 07/11/22 1600          PT Short Term Goals    STG 1 Pt to come to stand from an arm chair to his walker  and left kne brace conditional independent.  -LB     STG 2 Pt jennifer be indep with HEP to maintain progress in therapy  -LB     STG 3 Pt to ambulate 150 ft with left knee brace Rwx and CGA  -LB     STG 4 Assist pt with modification to left dynamic knee extension brace  -LB     STG 5 Pt  will negogiate curb with Rwx CGA and no VC  -LB            Long Term Goals    LTG Date to Achieve 10/03/22  -LB     LTG 1 Pt to come to stand from an armless chair with Rwx conditional independent.  -LB     LTG 2 Pt to complete the TUG in < 30 seconds while ambulating with FWW and knee brace.  -LB     LTG 3 Pt will increase left knee extension to 2/5  -LB     LTG 4 Pt to improve score on the TInetti  to 18/28 improve balance and reduce risk of falls.  -LB     LTG 5 Pt will be indep with progression of HEP  -LB     LTG 6 Pt to ascend and descend 4 steps with HRs and CGA.  -LB     LTG 7 Pt will ambulate household distances with left knee brace FWW and conditional independence  -LB            Time Calculation    PT Goal Re-Cert Due Date 08/08/22  -LB           User Key  (r) = Recorded By, (t) = Taken By, (c) = Cosigned By    Initials Name Provider Type    LB Nalini Cano, PT Physical Therapist                 PT Assessment/Plan     Row Name 07/11/22 3474          PT Assessment    Functional Limitations Decreased safety during functional activities;Impaired gait;Impaired locomotion;Limitation in home management;Performance in self-care ADL  -LB     Impairments Balance;Endurance;Gait;Motor function;Muscle strength;Peripheral nerve integrity;Impaired postural alignment  -LB     Assessment Comments Pt is a plesant 85 y/o male who presents to PT with impairments in functional mobility due to left LE weakness.  The patient has history of left iliopsoas hematoma with resultant femoral neuropathy.  The patient presents with focal left hip and knee weakness.  Left hip flexion test 2/5 and only trace knee extension with flexion 4/5.  The patient has a dynamic knee extension brace but currently the brace does not fit and has caused skin breakdown.  The patient and spouse are working closely with the orthotist to make modifications.  The dynamic brace would allow knee flexion with transfers and with ambulation which the patient  does not have with the use of his KI making sit to stand transfers quite difficult.  The patient is walking at home with walker, KI and CG assist of spouse.  The patient has significant hip and trunk flexion in gait and does not allow for much hip extension to help control his knee in stance.  The patient and spouse report good compliance with HEP for UE and left LE strengthening.  The patient and spouse goals are to increase independence so the spouse can return back to work.  She is currently working from home but wants to return to the office.  -LB     Please refer to paper survey for additional self-reported information Yes  -LB     Rehab Potential Good  -LB     Patient/caregiver participated in establishment of treatment plan and goals Yes  -LB     Patient would benefit from skilled therapy intervention Yes  -LB            PT Plan    PT Frequency 2x/week  -LB     Predicted Duration of Therapy Intervention (PT) 12 weeks  -LB     Planned CPT's? PT EVAL MOD COMPLELITY: 79198;PT THER PROC EA 15 MIN: 14538;PT THER ACT EA 15 MIN: 86583;PT GAIT TRAINING EA 15 MIN: 25450;PT INITIAL ORTHOTIC MGMT/TRAIN EA 15 MIN: 86620;PT SUBSEQUENT ORTHOTIC/PROSTHETIC TRAIN: 72619  -LB     Physical Therapy Interventions (Optional Details) balance training;bed mobility training;gait training;home exercise program;orthotic fitting/training;postural re-education;stair training;strengthening;transfer training  -LB           User Key  (r) = Recorded By, (t) = Taken By, (c) = Cosigned By    Initials Name Provider Type    Nalini Grey, PT Physical Therapist                    OP Exercises     Row Name 07/11/22 1600             Subjective Comments    Subjective Comments I do my exercises at least 2 times a day.  I do my arm exercises and my leg exercises  -LB              Subjective Pain    Able to rate subjective pain? yes  -LB      Pre-Treatment Pain Level 0  -LB      Post-Treatment Pain Level 0  -LB              Exercise 1    Exercise  "Name 1 quad sets  -LB              Exercise 2    Exercise Name 2 single knee to chest  -LB            User Key  (r) = Recorded By, (t) = Taken By, (c) = Cosigned By    Initials Name Provider Type    Nalini Grey PT Physical Therapist                            Outcome Measure Options: Tinetti, Timed Up and Go (TUG)  Tinetti Assessment  Sitting Balance: Steady,safe  Arises: Unable without help  Attempts to Rise: Unable without help  Immediate Standing Balance (first 5 sec): Unsteady (sway/stagger/feet move)  Standing Balance: Steady, stance > 4 inch KACY & requires support  Sternal Nudge (feet close together): Begins to fall  Eyes Closed (feet close together): Steady  Turning 360 Degrees- Steps: Discontinuous steps  Turning 360 Degrees- Steadiness: Unsteady (staggers, grabs)  Sitting Down: Uses arms or not a smooth motion  Tinetti Balance Score: 4  Gait Initiation (immediate after told \"go\"): Any hesitancy, multiple attempts to start  Step Length- Right Swing: Right swing foot does not pass Left stance leg  Step Length- Left Swing: Left swing foot does not pass Right  Foot Clearance- Right Foot: Right foot completely clears floor  Foot Clearance- Left Foot: Left foot does not completely clear floor  Step Symmetry: Right and Left step length equal  Step Continuity: Stop/discontinuity between steps  Path (excursion): Mild/moderate deviation or use of aid  Trunk: Marked sway or uses device  Base of Support: Heels close while walking  Gait Score: 4  Tinetti Total Score: 8  Tinetti Assistive Device: rolling walker (KI)  Timed Up and Go (TUG)  TUG Test 1: 46 seconds  Timed Up and Go Comments: KI and FWW    Time Calculation:   Start Time: 1515  Stop Time: 1600  Time Calculation (min): 45 min  Untimed Charges  PT Eval/Re-eval Minutes: 45  Total Minutes  Untimed Charges Total Minutes: 45   Total Minutes: 45   Therapy Charges for Today     Code Description Service Date Service Provider Modifiers Qty    [id number]  PT " EVAL MOD COMPLEXITY 3 7/11/2022 Nalini Cano, PT GP 1          PT G-Codes  Outcome Measure Options: Tinetti, Timed Up and Go (TUG)  Tinetti Total Score: 8  TUG Test 1: 46 seconds     Patient was wearing a face mask during this therapy encounter. Therapist used appropriate personal protective equipment including mask and gloves.  Mask used was standard procedure mask. Appropriate PPE was worn during the entire therapy session. Hand hygiene was completed before and after therapy session. Patient is not in enhanced droplet precautions.         Nalini Cano, PT  7/11/2022

## 2022-07-15 ENCOUNTER — HOSPITAL ENCOUNTER (OUTPATIENT)
Dept: PHYSICAL THERAPY | Facility: HOSPITAL | Age: 87
Setting detail: THERAPIES SERIES
Discharge: HOME | End: 2022-07-15

## 2022-07-15 ENCOUNTER — ANTICOAGULATION VISIT (OUTPATIENT)
Dept: PHARMACY | Facility: HOSPITAL | Age: 87
End: 2022-07-15

## 2022-07-15 DIAGNOSIS — I63.411 CEREBROVASCULAR ACCIDENT (CVA) DUE TO EMBOLISM OF RIGHT MIDDLE CEREBRAL ARTERY: ICD-10-CM

## 2022-07-15 DIAGNOSIS — R26.89 FUNCTIONAL GAIT ABNORMALITY: ICD-10-CM

## 2022-07-15 DIAGNOSIS — Z91.81 RISK FOR FALLS: ICD-10-CM

## 2022-07-15 DIAGNOSIS — S70.12XA HEMATOMA OF ILIOPSOAS MUSCLE, LEFT, INITIAL ENCOUNTER: Primary | ICD-10-CM

## 2022-07-15 DIAGNOSIS — Z95.2 H/O HEART VALVE REPLACEMENT WITH MECHANICAL VALVE: Primary | ICD-10-CM

## 2022-07-15 DIAGNOSIS — M62.81 QUADRICEPS WEAKNESS: ICD-10-CM

## 2022-07-15 LAB — INR PPP: 3.2

## 2022-07-15 PROCEDURE — 97110 THERAPEUTIC EXERCISES: CPT

## 2022-07-15 PROCEDURE — 97530 THERAPEUTIC ACTIVITIES: CPT

## 2022-07-15 NOTE — PROGRESS NOTES
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    INR History:  Anticoagulation Monitoring 7/1/2022 7/8/2022 7/15/2022   INR 2.80 3.00 3.20   INR Date 7/1/2022 7/8/2022 7/15/2022   INR Goal 3.0-3.5 3.0-3.5 3.0-3.5   Trend Same Up Same   Last Week Total 47.5 mg 50 mg 50 mg   Next Week Total 50 mg 50 mg 50 mg   Sun 7.5 mg 7.5 mg 7.5 mg   Mon 7.5 mg 7.5 mg 7.5 mg   Tue 5 mg 7.5 mg 7.5 mg   Wed 7.5 mg 7.5 mg 7.5 mg   Thu 5 mg 5 mg 5 mg   Fri 10 mg (7/1) 7.5 mg 7.5 mg   Sat 7.5 mg 7.5 mg 7.5 mg   Visit Report - - -   Some recent data might be hidden       Plan:  1. INR is therapeutic today- see above in Anticoagulation Summary.    Francisco Sequeira to continue their warfarin regimen- see above in Anticoagulation Summary.  2. Follow up in 1 week  3. They have been instructed to call if any changes in medications, doses, concerns, etc. Patient expresses understanding and has no further  questions at this time.    Shannon Flor, Pharmacy Technician

## 2022-07-15 NOTE — THERAPY TREATMENT NOTE
Outpatient Physical Therapy Neuro Treatment Note  [facility]     Patient Name: Francisco Sequeira  : 1935  MRN: [id number]  Today's Date: 7/15/2022      Visit Date: 07/15/2022    Visit Dx:    ICD-10-CM ICD-9-CM   1. Hematoma of iliopsoas muscle, left, initial encounter  S70.12XA 924.00   2. Quadriceps weakness  M62.81 728.87   3. Functional gait abnormality  R26.89 781.2   4. Risk for falls  Z91.81 V15.88       Patient Active Problem List   Diagnosis   • Atrial fibrillation (HCC)   • Hypertension   • Atopic rhinitis   • Gastroesophageal reflux disease   • Hyperlipidemia   • Type 2 diabetes mellitus (HCC)   • Low testosterone   • H/O heart valve replacement with mechanical valve   • Kidney carcinoma (HCC)   • Stage 3b chronic kidney disease (HCC)   • BYRNO (acute kidney injury) (HCC)   • 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)   • Iron deficiency anemia   • Chronic anticoagulation   • Hemiparesis of left nondominant side as late effect of cerebral infarction (HCC)   • Rectus sheath hematoma   • Hospital discharge follow-up   • Sepsis (HCC)   • Calculus of gallbladder with acute cholecystitis without obstruction   • History of Clostridium difficile infection   • Aspiration pneumonitis (HCC)   • Hematoma of iliopsoas muscle, left, initial encounter   • History of CVA (cerebrovascular accident)   • S/P laparoscopic cholecystectomy   • Anemia   • Hematoma of left iliopsoas muscle            PT Neuro     Row Name 07/15/22 1442             Subjective Comments    Subjective Comments I want my leg to do better  -LB              Precautions and Contraindications    Precautions/Limitations fall precautions  -LB      Precautions Significant quad weakness, must wear brace with standing/ambulation activities  -LB              Subjective Pain    Able to rate subjective pain? yes  -LB      Pre-Treatment Pain Level 0  -LB      Post-Treatment Pain Level 0  -LB               Cognition    Overall Cognitive Status WFL  -LB      Arousal/Alertness Appropriate responses to stimuli  -LB              Posture/Observations    Posture/Observations Comments PT arrived to [facility] in transport wheelchair.  Spouse present  -LB              Bed Mobility    Supine-Sit [place] (Bed Mobility) minimum assist (75% patient effort)  -LB      Sit-Supine [place] (Bed Mobility) minimum assist (75% patient effort)  -LB              Transfers    Bed-Chair [place] (Transfers) contact guard  -LB      Transfers, Bed-Chair-Bed, Assist Device rolling walker  KI  -LB      Sit-Stand [place] (Transfers) verbal cues;contact guard  -LB      Stand-Sit [place] (Transfers) verbal cues;minimum assist (75% patient effort)  -LB      Transfers, Sit-Stand-Sit, Assist Device rolling walker  -LB      Comment, (Transfers) pt pushing signifiancantly through UE.  Assist needed to control the descent and cues to step left LE forward as knee cannnot bend in KI  -LB              Gait/Stairs (Locomotion)    [place] Level (Gait) minimum assist (75% patient effort)  -LB      Assistive Device (Gait) walker, front-wheeled  -LB      Distance in Feet (Gait) 40 x 2  -LB      Pattern (Gait) step-to  -LB      Deviations/Abnormal Patterns (Gait) bilateral deviations;base of support, narrow;gait speed decreased;stride length decreased  increased weight bearing through UEs  -LB      Left Sided Gait Deviations weight shift ability decreased  Min manual cues for extension; assist to prevent adduction  -LB      Gait Assessment/Intervention Pt able to use hamstrings and hip extension in stance  -LB            User Key  (r) = Recorded By, (t) = Taken By, (c) = Cosigned By    Initials Name Provider Type    Nalini Grey PT Physical Therapist                         PT Assessment/Plan     Row Name 07/15/22 6022          PT Assessment    Assessment Comments The patient with limited knee contraction in sitting or supine.  The  patient tolerated the exercises well with tactile cues to help engage muscle contraction.  The patient is able to use hip extensors and hamstrings to help control knee in stance with only minimal cues to control knee with ambulation.  Spoke to  and working on having brace adjusted and neoprene sleeve delivered so the patient can use the extensor brace.  -LB           User Key  (r) = Recorded By, (t) = Taken By, (c) = Cosigned By    Initials Name Provider Type    Nalini Grey PT Physical Therapist                    OP Exercises     Row Name 07/15/22 1619 07/15/22 1442          Subjective Comments    Subjective Comments -- I want my leg to do better  -LB            Subjective Pain    Able to rate subjective pain? -- yes  -LB     Pre-Treatment Pain Level -- 0  -LB     Post-Treatment Pain Level -- 0  -LB            Total Minutes    99928 - PT Therapeutic Exercise Minutes 34  -LB --     58125 - PT Therapeutic Activity Minutes 11  -LB --            Exercise 1    Exercise Name 1 -- quad sets  -LB     Sets 1 -- 2  -LB     Reps 1 -- 10  -LB            Exercise 2    Exercise Name 2 -- SLR  -LB     Sets 2 -- 2  -LB     Reps 2 -- 10  -LB     Additional Comments -- with Assist  -LB            Exercise 3    Exercise Name 3 -- Hip abduction supine  -LB     Sets 3 -- 2  -LB     Reps 3 -- 10  -LB     Additional Comments -- wtih Assist  -LB            Exercise 4    Exercise Name 4 -- SAQs  -LB     Sets 4 -- 2  -LB     Reps 4 -- 10  -LB     Additional Comments -- max A  -LB            Exercise 5    Exercise Name 5 -- briidges with thighs on bolter  -LB     Reps 5 -- 10  -LB           User Key  (r) = Recorded By, (t) = Taken By, (c) = Cosigned By    Initials Name Provider Type    Nalini Grey PT Physical Therapist                                               Time Calculation:   Start Time: 1430  Stop Time: 1515  Time Calculation (min): 45 min  Timed Charges  74456 - PT Therapeutic Exercise Minutes: 34  58129 - PT  Therapeutic Activity Minutes: 11  Total Minutes  Timed Charges Total Minutes: 45   Total Minutes: 45   Therapy Charges for Today     Code Description Service Date Service Provider Modifiers Qty    19335210254  PT THER PROC EA 15 MIN 7/15/2022 Nalini Cano, PT GP 2    [id number]  PT THERAPEUTIC ACT EA 15 MIN 7/15/2022 Nalini Cano, PT GP 1              Patient was wearing a face mask during this therapy encounter. Therapist used appropriate personal protective equipment including mask and gloves.  Mask used was standard procedure mask. Appropriate PPE was worn during the entire therapy session. Hand hygiene was completed before and after therapy session. Patient is not in enhanced droplet precautions.           Nalini Cano, PT  7/15/2022

## 2022-07-18 ENCOUNTER — HOSPITAL ENCOUNTER (OUTPATIENT)
Dept: PHYSICAL THERAPY | Facility: HOSPITAL | Age: 87
Setting detail: THERAPIES SERIES
Discharge: HOME | End: 2022-07-18

## 2022-07-18 DIAGNOSIS — S70.12XA HEMATOMA OF ILIOPSOAS MUSCLE, LEFT, INITIAL ENCOUNTER: Primary | ICD-10-CM

## 2022-07-18 DIAGNOSIS — M62.81 QUADRICEPS WEAKNESS: ICD-10-CM

## 2022-07-18 DIAGNOSIS — Z91.81 RISK FOR FALLS: ICD-10-CM

## 2022-07-18 DIAGNOSIS — R26.89 FUNCTIONAL GAIT ABNORMALITY: ICD-10-CM

## 2022-07-18 PROCEDURE — 97110 THERAPEUTIC EXERCISES: CPT

## 2022-07-18 PROCEDURE — 97530 THERAPEUTIC ACTIVITIES: CPT

## 2022-07-18 NOTE — THERAPY TREATMENT NOTE
Outpatient Physical Therapy Neuro Treatment Note  [facility]     Patient Name: Francisco Sequeira  : 1935  MRN: [id number]  Today's Date: 2022      Visit Date: 2022    Visit Dx:    ICD-10-CM ICD-9-CM   1. Hematoma of iliopsoas muscle, left, initial encounter  S70.12XA 924.00   2. Quadriceps weakness  M62.81 728.87   3. Functional gait abnormality  R26.89 781.2   4. Risk for falls  Z91.81 V15.88       Patient Active Problem List   Diagnosis   • Atrial fibrillation (HCC)   • Hypertension   • Atopic rhinitis   • Gastroesophageal reflux disease   • Hyperlipidemia   • Type 2 diabetes mellitus (HCC)   • Low testosterone   • H/O heart valve replacement with mechanical valve   • Kidney carcinoma (HCC)   • Stage 3b chronic kidney disease (HCC)   • BYRON (acute kidney injury) (HCC)   • 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)   • Iron deficiency anemia   • Chronic anticoagulation   • Hemiparesis of left nondominant side as late effect of cerebral infarction (HCC)   • Rectus sheath hematoma   • Hospital discharge follow-up   • Sepsis (HCC)   • Calculus of gallbladder with acute cholecystitis without obstruction   • History of Clostridium difficile infection   • Aspiration pneumonitis (HCC)   • Hematoma of iliopsoas muscle, left, initial encounter   • History of CVA (cerebrovascular accident)   • S/P laparoscopic cholecystectomy   • Anemia   • Hematoma of left iliopsoas muscle            PT Neuro     Row Name 22 1610             Subjective Comments    Subjective Comments I am ready to go  -LB              Precautions and Contraindications    Precautions/Limitations fall precautions  -LB      Precautions Significant quad weakness  -LB              Subjective Pain    Able to rate subjective pain? yes  -LB      Pre-Treatment Pain Level 0  -LB      Post-Treatment Pain Level 0  -LB              Cognition    Overall Cognitive Status WFL  -LB               Posture/Observations    Posture/Observations Comments PT arrived to unit in transport wheelchair.  Spouse present  -LB              Bed Mobility    Supine-Sit [place] (Bed Mobility) minimum assist (75% patient effort)  -LB      Sit-Supine [place] (Bed Mobility) minimum assist (75% patient effort)  -LB      Comment, (Bed Mobility) A for left LE  -LB              Transfers    Sit-Stand [place] (Transfers) verbal cues;contact guard  -LB      Stand-Sit [place] (Transfers) verbal cues;minimum assist (75% patient effort)  -LB      Transfers, Sit-Stand-Sit, Assist Device rolling walker  -LB      Comment, (Transfers) cues on left quad, increased use of UEs to push to stand  -LB              Gait/Stairs (Locomotion)    [place] Level (Gait) minimum assist (75% patient effort);contact guard  -LB      Assistive Device (Gait) walker, front-wheeled  -LB      Distance in Feet (Gait) 40 x 4  -LB      Pattern (Gait) step-to  -LB      Deviations/Abnormal Patterns (Gait) bilateral deviations;base of support, narrow;gait speed decreased;stride length decreased  increased weight bearing through UEs  -LB      Bilateral Gait Deviations forward flexed posture;heel strike decreased;weight shift ability decreased  -LB      Left Sided Gait Deviations weight shift ability decreased  Min manual cues for knee extension; assist to prevent adduction.  Pt increasing use of hamstrings and gluts to prevent knee buckling on left  -LB            User Key  (r) = Recorded By, (t) = Taken By, (c) = Cosigned By    Initials Name Provider Type    Nalini Grey, PT Physical Therapist                         PT Assessment/Plan     Row Name 07/18/22 2832          PT Assessment    Assessment Comments The patient is able to display improved knee contol in stance and walking with use of rolling walker.  The patient is able to engage hamstring and gluts to help control the knee.  Unable to illicit much quad contraction with ther ex or  stance.  Advised continued use of KI at home with ambulation.  -LB           User Key  (r) = Recorded By, (t) = Taken By, (c) = Cosigned By    Initials Name Provider Type    Nalini Grey PT Physical Therapist                    OP Exercises     Row Name 07/18/22 1705 07/18/22 1700 07/18/22 1610       Subjective Comments    Subjective Comments -- -- I am ready to go  -LB       Subjective Pain    Able to rate subjective pain? -- -- yes  -LB    Pre-Treatment Pain Level -- -- 0  -LB    Post-Treatment Pain Level -- -- 0  -LB       Total Minutes    94305 - PT Therapeutic Exercise Minutes 34  -LB -- --    07410 - PT Therapeutic Activity Minutes 11  -LB -- --       Exercise 1    Exercise Name 1 -- quad sets  -LB --    Sets 1 -- 1  -LB --    Reps 1 -- 10  -LB --       Exercise 2    Exercise Name 2 -- SLR  -LB --    Sets 2 -- 1  -LB --    Reps 2 -- 10  -LB --    Additional Comments -- With assist  -LB --       Exercise 3    Exercise Name 3 -- Hip abduction supine  -LB --    Sets 3 -- 2  -LB --    Reps 3 -- 10  -LB --    Additional Comments -- with Assist  -LB --       Exercise 4    Exercise Name 4 -- SAQs  -LB --    Sets 4 -- 2  -LB --    Reps 4 -- 10  -LB --    Additional Comments -- with Max A  -LB --       Exercise 5    Exercise Name 5 -- briidges  -LB --    Sets 5 -- 2  -LB --    Reps 5 -- 10  -LB --       Exercise 6    Exercise Name 6 -- seated knee flexion and extension  -LB --    Sets 6 -- 1  -LB --    Reps 6 -- 10  -LB --       Exercise 7    Exercise Name 7 -- SKTC  -LB --    Sets 7 -- 2  -LB --    Reps 7 -- 10  -LB --    Additional Comments -- with Assist  -LB --       Exercise 8    Exercise Name 8 -- knee drop out  -LB --    Sets 8 -- 1  -LB --    Reps 8 -- 10  -LB --          User Key  (r) = Recorded By, (t) = Taken By, (c) = Cosigned By    Initials Name Provider Type    LB Baehl, Nalini B, PT Physical Therapist                                Therapy Education  Education Details: SKTC, knee drop out  Given:  HEP  Program: New  How Provided: Verbal, Demonstration  Provided to: Patient, Caregiver  Level of Understanding: Verbalized              Time Calculation:   Start Time: 1524  Stop Time: 1610  Time Calculation (min): 46 min  Timed Charges  47796 - PT Therapeutic Exercise Minutes: 34  21024 - PT Therapeutic Activity Minutes: 11  Total Minutes  Timed Charges Total Minutes: 45   Total Minutes: 45   Therapy Charges for Today     Code Description Service Date Service Provider Modifiers Qty    07572192945  PT THER PROC EA 15 MIN 7/18/2022 Nalini Cano, PT GP 2    [id number]  PT THERAPEUTIC ACT EA 15 MIN 7/18/2022 Nalini Cano PT GP 1            Patient was wearing a face mask during this therapy encounter. Therapist used appropriate personal protective equipment including mask and gloves.  Mask used was standard procedure mask. Appropriate PPE was worn during the entire therapy session. Hand hygiene was completed before and after therapy session. Patient is not in enhanced droplet precautions.             Nalini Cano PT  7/18/2022

## 2022-07-22 ENCOUNTER — HOSPITAL ENCOUNTER (OUTPATIENT)
Dept: PHYSICAL THERAPY | Facility: HOSPITAL | Age: 87
Setting detail: THERAPIES SERIES
Discharge: HOME | End: 2022-07-22

## 2022-07-22 ENCOUNTER — ANTICOAGULATION VISIT (OUTPATIENT)
Dept: PHARMACY | Facility: HOSPITAL | Age: 87
End: 2022-07-22

## 2022-07-22 DIAGNOSIS — S70.12XA HEMATOMA OF ILIOPSOAS MUSCLE, LEFT, INITIAL ENCOUNTER: Primary | ICD-10-CM

## 2022-07-22 DIAGNOSIS — M62.81 QUADRICEPS WEAKNESS: ICD-10-CM

## 2022-07-22 DIAGNOSIS — Z95.2 H/O HEART VALVE REPLACEMENT WITH MECHANICAL VALVE: Primary | ICD-10-CM

## 2022-07-22 DIAGNOSIS — Z91.81 RISK FOR FALLS: ICD-10-CM

## 2022-07-22 DIAGNOSIS — R26.89 FUNCTIONAL GAIT ABNORMALITY: ICD-10-CM

## 2022-07-22 DIAGNOSIS — I63.411 CEREBROVASCULAR ACCIDENT (CVA) DUE TO EMBOLISM OF RIGHT MIDDLE CEREBRAL ARTERY: ICD-10-CM

## 2022-07-22 LAB — INR PPP: 3.3

## 2022-07-22 PROCEDURE — 97530 THERAPEUTIC ACTIVITIES: CPT

## 2022-07-22 PROCEDURE — 97110 THERAPEUTIC EXERCISES: CPT

## 2022-07-22 NOTE — THERAPY TREATMENT NOTE
Outpatient Physical Therapy Neuro Treatment Note  [facility]     Patient Name: Francisco Sequeira  : 1935  MRN: [id number]  Today's Date: 2022      Visit Date: 2022    Visit Dx:    ICD-10-CM ICD-9-CM   1. Hematoma of iliopsoas muscle, left, initial encounter  S70.12XA 924.00   2. Quadriceps weakness  M62.81 728.87   3. Functional gait abnormality  R26.89 781.2   4. Risk for falls  Z91.81 V15.88       Patient Active Problem List   Diagnosis   • Atrial fibrillation (HCC)   • Hypertension   • Atopic rhinitis   • Gastroesophageal reflux disease   • Hyperlipidemia   • Type 2 diabetes mellitus (HCC)   • Low testosterone   • H/O heart valve replacement with mechanical valve   • Kidney carcinoma (HCC)   • Stage 3b chronic kidney disease (HCC)   • BYRON (acute kidney injury) (HCC)   • 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)   • Iron deficiency anemia   • Chronic anticoagulation   • Hemiparesis of left nondominant side as late effect of cerebral infarction (HCC)   • Rectus sheath hematoma   • Hospital discharge follow-up   • Sepsis (HCC)   • Calculus of gallbladder with acute cholecystitis without obstruction   • History of Clostridium difficile infection   • Aspiration pneumonitis (HCC)   • Hematoma of iliopsoas muscle, left, initial encounter   • History of CVA (cerebrovascular accident)   • S/P laparoscopic cholecystectomy   • Anemia   • Hematoma of left iliopsoas muscle          PT Ortho     Row Name 22 1428       Gait/Stairs (Locomotion)    [place] Level (Gait) minimum assist (75% patient effort);contact guard  -DP    Assistive Device (Gait) walker, front-wheeled  -DP    Distance in Feet (Gait) 40x3  -DP    Pattern (Gait) step-to  -DP    Deviations/Abnormal Patterns (Gait) bilateral deviations;base of support, narrow;gait speed decreased;stride length decreased  -DP    Bilateral Gait Deviations forward flexed posture;heel strike  decreased;weight shift ability decreased  -DP    Left Sided Gait Deviations weight shift ability decreased  -DP    Comment, (Gait/Stairs) cues for standing erect, and TC for L knee extension and LLE abduction. Pt had increased knee buckeling when turning to sit after ambulation but improved as session went on  -DP          User Key  (r) = Recorded By, (t) = Taken By, (c) = Cosigned By    Initials Name Provider Type    Vini Casillas PT Physical Therapist               PT Neuro     Row Name 07/22/22 8075             Precautions and Contraindications    Precautions/Limitations fall precautions  -DP      Precautions Significant quad weakness  -DP              Subjective Pain    Able to rate subjective pain? yes  -DP      Pre-Treatment Pain Level 0  -DP      Post-Treatment Pain Level 0  -DP              Cognition    Overall Cognitive Status WFL  -DP      Arousal/Alertness Appropriate responses to stimuli  -DP      Memory Decreased recall of recent events  -DP      Orientation Level Oriented to situation  -DP      Safety Judgment Good awareness of safety precautions  -DP      Deficits Fully aware of deficits  -DP              Posture/Observations    Posture/Observations Comments PT arrived to unit in transport wheelchair.  Spouse present  -DP              Transfers    Transfers, Bed-Chair-Bed, Assist Device rolling walker  -DP      Sit-Stand [place] (Transfers) verbal cues;contact guard  -DP      Stand-Sit [place] (Transfers) verbal cues;minimum assist (75% patient effort)  -DP      Transfers, Sit-Stand-Sit, Assist Device rolling walker  -DP            User Key  (r) = Recorded By, (t) = Taken By, (c) = Cosigned By    Initials Name Provider Type    Vini Casillas PT Physical Therapist                         PT Assessment/Plan     Row Name 07/22/22 1626          PT Assessment    Functional Limitations Decreased safety during functional activities;Impaired gait;Impaired locomotion;Limitation in home  management;Performance in self-care ADL  -DP     Impairments Balance;Endurance;Gait;Motor function;Muscle strength;Peripheral nerve integrity;Impaired postural alignment  -DP     Assessment Comments Pt at this point is requiring Emili to SBA with help for knee extension during stance phase of LLE but about 60-70% of the time requires TC for hip abduction to avoid narrow KACY. Pt still requires heavy assist for quad contraction with supine ther ex.  -DP     Please refer to paper survey for additional self-reported information Yes  -DP     Rehab Potential Good  -DP     Patient/caregiver participated in establishment of treatment plan and goals Yes  -DP     Patient would benefit from skilled therapy intervention Yes  -DP           User Key  (r) = Recorded By, (t) = Taken By, (c) = Cosigned By    Initials Name Provider Type    Vini Casillas PT Physical Therapist                    OP Exercises     Row Name 07/22/22 1428             Precautions    Existing Precautions/Restrictions fall  -DP              Subjective Comments    Subjective Comments Pt says he feels good and has been using KI at home  -DP              Subjective Pain    Able to rate subjective pain? yes  -DP      Pre-Treatment Pain Level 0  -DP      Post-Treatment Pain Level 0  -DP              Total Minutes    02183 - PT Therapeutic Exercise Minutes 25  -DP      87138 - PT Therapeutic Activity Minutes 20  -DP              Exercise 1    Exercise Name 1 quad sets  -DP      Cueing 1 Verbal  -DP      Sets 1 1  -DP      Reps 1 10  -DP              Exercise 4    Exercise Name 4 SAQs  -DP      Sets 4 1  -DP      Reps 4 10  -DP      Additional Comments w/ maxA  -DP              Exercise 5    Exercise Name 5 bridges  -DP      Cueing 5 Verbal  -DP      Sets 5 2  -DP      Reps 5 10  -DP              Exercise 8    Exercise Name 8 knee drop out  -DP      Sets 8 1  -DP      Reps 8 10  -DP            User Key  (r) = Recorded By, (t) = Taken By, (c) = Cosigned By     Initials Name Provider Type    DP Vini Lombardo PT Physical Therapist                                Therapy Education  Education Details: Continue HEP as previously instructed  Given: HEP  Program: Reinforced  Level of Understanding: Verbalized              Time Calculation:   Start Time: 1430  Stop Time: 1515  Time Calculation (min): 45 min  Total Timed Code Minutes- PT: 45 minute(s)  Timed Charges  66636 - PT Therapeutic Exercise Minutes: 25  41924 - PT Therapeutic Activity Minutes: 20  Total Minutes  Timed Charges Total Minutes: 45   Total Minutes: 45   Therapy Charges for Today     Code Description Service Date Service Provider Modifiers Qty    33539889236 HC PT THER PROC EA 15 MIN 7/22/2022 Vini Lombardo PT GP 2    [id number] HC PT THERAPEUTIC ACT EA 15 MIN 7/22/2022 Vini Lombardo PT GP 1        Patient was wearing a face mask during this therapy encounter. Therapist used appropriate personal protective equipment including mask and gloves.  Mask used was standard procedure mask. Appropriate PPE was worn during the entire therapy session. Hand hygiene was completed before and after therapy session. Patient is not in enhanced droplet precautions.               Vini Lombardo PT  7/22/2022

## 2022-07-22 NOTE — PROGRESS NOTES
Anticoagulation Clinic Progress Note    Anticoagulation Summary  As of 7/22/2022    INR goal:  3.0-3.5   TTR:  67.0 % (3.5 y)   INR used for dosing:  3.30 (7/22/2022)   Warfarin maintenance plan:  5 mg every Thu; 7.5 mg all other days   Weekly warfarin total:  50 mg   No change documented:  Natty Sullivan   Plan last modified:  Michelle Piña RPH (7/8/2022)   Next INR check:  7/29/2022   Priority:  High   Target end date:  Indefinite    Indications    H/O heart valve replacement with mechanical valve [Z95.2]  Atrial fibrillation (HCC) [I48.91]  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]             Anticoagulation Episode Summary     INR check location:      Preferred lab:      Send INR reminders to:  [facility]  POOL    Comments:  New INR goal 3 - 3.5 (see 1/2020 hospitalization for CVA)      Anticoagulation Care Providers     Provider Role Specialty Phone number    Griffin Fried MD Referring Cardiology [phone]          Clinic Interview:  No pertinent clinical findings have been reported.    INR History:  Anticoagulation Monitoring 7/8/2022 7/15/2022 7/22/2022   INR 3.00 3.20 3.30   INR Date 7/8/2022 7/15/2022 7/22/2022   INR Goal 3.0-3.5 3.0-3.5 3.0-3.5   Trend Up Same Same   Last Week Total 50 mg 50 mg 50 mg   Next Week Total 50 mg 50 mg 50 mg   Sun 7.5 mg 7.5 mg 7.5 mg   Mon 7.5 mg 7.5 mg 7.5 mg   Tue 7.5 mg 7.5 mg 7.5 mg   Wed 7.5 mg 7.5 mg 7.5 mg   Thu 5 mg 5 mg 5 mg   Fri 7.5 mg 7.5 mg 7.5 mg   Sat 7.5 mg 7.5 mg 7.5 mg   Visit Report - - -   Some recent data might be hidden       Plan:  1. INR is therapeutic today- see above in Anticoagulation Summary.    Francisco Sequeira to continue their warfarin regimen- see above in Anticoagulation Summary.  2. Follow up in 1 week  3. Pt has agreed to only be called if INR out of range. They have been instructed to call if any changes in medications, doses, concerns, etc. Patient  expresses understanding and has no further questions at this time.    Natty Sullivan

## 2022-07-25 ENCOUNTER — HOSPITAL ENCOUNTER (OUTPATIENT)
Dept: PHYSICAL THERAPY | Facility: HOSPITAL | Age: 87
Setting detail: THERAPIES SERIES
Discharge: HOME | End: 2022-07-25

## 2022-07-25 DIAGNOSIS — Z91.81 RISK FOR FALLS: ICD-10-CM

## 2022-07-25 DIAGNOSIS — S70.12XA HEMATOMA OF ILIOPSOAS MUSCLE, LEFT, INITIAL ENCOUNTER: ICD-10-CM

## 2022-07-25 DIAGNOSIS — R26.89 FUNCTIONAL GAIT ABNORMALITY: ICD-10-CM

## 2022-07-25 DIAGNOSIS — M62.81 QUADRICEPS WEAKNESS: Primary | ICD-10-CM

## 2022-07-25 PROCEDURE — 97530 THERAPEUTIC ACTIVITIES: CPT

## 2022-07-25 PROCEDURE — 97116 GAIT TRAINING THERAPY: CPT

## 2022-07-25 PROCEDURE — 97110 THERAPEUTIC EXERCISES: CPT

## 2022-07-25 NOTE — THERAPY TREATMENT NOTE
Outpatient Physical Therapy Ortho Treatment Note  [facility]     Patient Name: Francisco Sequeira  : 1935  MRN: [id number]  Today's Date: 2022      Visit Date: 2022    Visit Dx:    ICD-10-CM ICD-9-CM   1. Quadriceps weakness  M62.81 728.87   2. Functional gait abnormality  R26.89 781.2   3. Risk for falls  Z91.81 V15.88   4. 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   • Kidney carcinoma (HCC)   • Stage 3b chronic kidney disease (HCC)   • BYRON (acute kidney injury) ([facility])   • 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)   • Iron deficiency anemia   • Chronic anticoagulation   • Hemiparesis of left nondominant side as late effect of cerebral infarction ([facility])   • Rectus sheath hematoma   • Hospital discharge follow-up   • Sepsis ([facility])   • Calculus of gallbladder with acute cholecystitis without obstruction   • History of Clostridium difficile infection   • Aspiration pneumonitis ([facility])   • Hematoma of iliopsoas muscle, left, initial encounter   • History of CVA (cerebrovascular accident)   • S/P laparoscopic cholecystectomy   • Anemia   • Hematoma of left iliopsoas muscle        Past Medical History:   Diagnosis Date   • Allergic rhinitis    • Aortic valve insufficiency    • Ascending aortic aneurysm (HCC)    • Atrial fibrillation ([facility])    • Bacteremia    • Calcific aortic stenosis of bicuspid valve    • Cardiac arrest ([facility])    • Cardiomyopathy ([facility])    • CKD (chronic kidney disease) stage 3, GFR 30-59 ml/min ([facility])    • Contact dermatitis due to poison ivy    • Elbow fracture    • Esophageal reflux    • GERD (gastroesophageal reflux disease)    • Head  injury    • History of transfusion    • Hyperglycemia    • Hyperlipidemia    • Hypertension    • Kidney carcinoma (HCC)    • Nonischemic cardiomyopathy (HCC)    • Renal oncocytoma    • Seasonal allergic reaction    • Sinusitis    • Syncope    • Type 2 diabetes mellitus (HCC)     uncontrolled   • Visual field defect         Past Surgical History:   Procedure Laterality Date   • AORTIC VALVE REPAIR/REPLACEMENT     • ASCENDING AORTIC ANEURYSM REPAIR W/ MECHANICAL AORTIC VALVE REPLACEMENT     • CHOLECYSTECTOMY WITH INTRAOPERATIVE CHOLANGIOGRAM N/A 3/29/2022    Procedure: Laparoscopic cholecystectomy with cholangiogram, possible open;  Surgeon: Lisa Guidry MD;  Location: [facility] MAIN OR;  Service: General;  Laterality: N/A;   • COLONOSCOPY N/A 10/28/2021    Procedure: COLONOSCOPY to cecum:  cold snare polyps,;  Surgeon: Dinesh Meza MD;  Location: [facility] ENDOSCOPY;  Service: Gastroenterology;  Laterality: N/A;  pre:  Iron deficiency anemia  post:  polyps, diverticulosis,    • COLONOSCOPY N/A 11/9/2021    Procedure: COLONOSCOPY to cecum with APC cautery of AVM and clip placement x1;  Surgeon: Pavan Rodriguez MD;  Location: [facility] ENDOSCOPY;  Service: Gastroenterology;  Laterality: N/A;  PRE - gi bleed, anemia  POST - diverticulosis, poor prep, AVM right colon   • ENDOSCOPY N/A 10/28/2021    Procedure: ESOPHAGOGASTRODUODENOSCOPY with biopsies;  Surgeon: Dinesh Meza MD;  Location: [facility] ENDOSCOPY;  Service: Gastroenterology;  Laterality: N/A;  pre:  Iron deficiency anemia  post:  duodenitis and gastritis   • EYE SURGERY  12/09/2020    cataract surgery    • NEPHRECTOMY     • OTHER SURGICAL HISTORY      elbow surgery   • OTHER SURGICAL HISTORY      right arm surgery   • PROSTATE SURGERY     • THORACENTESIS Left     diagnostic        PT Ortho     Row Name 07/25/22 9893       Subjective Comments    Subjective Comments Pt says he required help to go to the restroom yesterday from family member  while rating out at restturant  -DP       Precautions and Contraindications    Precautions/Limitations fall precautions  -DP    Precautions Significant quad weakness  -DP       Subjective Pain    Able to rate subjective pain? yes  -DP    Pre-Treatment Pain Level 0  -DP    Post-Treatment Pain Level 0  -DP       Posture/Observations    Alignment Options Forward head;Rounded shoulders  -DP    Forward Head Moderate  -DP    Rounded Shoulders Moderate  -DP    Posture/Observations Comments PT arrived to unit in transport wheelchair.  Spouse present  -DP       Transfers    61462 - PT Therapeutic Activity Minutes 13  -DP    Sit-Stand Gunter (Transfers) verbal cues;contact guard  -DP    Stand-Sit Gunter (Transfers) verbal cues;minimum assist (75% patient effort)  -DP    Transfers, Sit-Stand-Sit, Assist Device rolling walker  -DP    Comment, (Transfers) Pt was educated on ambulating with wife and using KI at home as pt's wife was worried that he is going to try to ambulate without her and without KI. Pt does not have grab bars in BR at home and has difficulty with getting pants down in a safe manner and was told to wear loose fitting pants.in order to safety doff pants.  -DP       Gait/Stairs (Locomotion)    Gunter Level (Gait) minimum assist (75% patient effort);contact guard  -DP    Assistive Device (Gait) walker, front-wheeled  -DP    Distance in Feet (Gait) 40'x3  -DP    Pattern (Gait) step-to  -DP    Deviations/Abnormal Patterns (Gait) bilateral deviations;base of support, narrow;gait speed decreased;stride length decreased  -DP    Bilateral Gait Deviations forward flexed posture;heel strike decreased;weight shift ability decreased  -DP    Left Sided Gait Deviations weight shift ability decreased  -DP    Comment, (Gait/Stairs) Pt had one instance of knee buckling but was able to use eccentric control and just required SBA at knee from PT  -DP          User Key  (r) = Recorded By, (t) = Taken By, (c) =  Cosigned By    Initials Name Provider Type    Vini Casillas PT Physical Therapist                             PT Assessment/Plan     Row Name 07/25/22 1616          PT Assessment    Functional Limitations Decreased safety during functional activities;Impaired gait;Impaired locomotion;Limitation in home management;Performance in self-care ADL  -DP     Impairments Balance;Endurance;Gait;Motor function;Muscle strength;Peripheral nerve integrity;Impaired postural alignment  -DP     Assessment Comments Pt still requiring Emili for reduce narrow KACY and requires mod maintain erect posture as pt becomes fatigued and/or wants to watch his feet. Pt did have one instance of knee buckling but was able to maintain balance himself with SBA at knee and CGA at gait belt.  -DP           User Key  (r) = Recorded By, (t) = Taken By, (c) = Cosigned By    Initials Name Provider Type    Vini Casillas PT Physical Therapist                   OP Exercises     Row Name 07/25/22 1531             Precautions    Existing Precautions/Restrictions fall  -DP              Subjective Comments    Subjective Comments Pt says he required help to go to the restroom yesterday from family member while rating out at restturant  -DP              Subjective Pain    Able to rate subjective pain? yes  -DP      Pre-Treatment Pain Level 0  -DP      Post-Treatment Pain Level 0  -DP              Total Minutes    20097 - Gait Training Minutes  30  -DP      24055 - PT Therapeutic Exercise Minutes 15  -DP      76923 - PT Therapeutic Activity Minutes 13  -DP              Exercise 1    Exercise Name 1 quad sets  -DP      Cueing 1 Verbal  -DP      Sets 1 1  -DP      Reps 1 10  -DP      Additional Comments using pool noodle  -DP              Exercise 5    Exercise Name 5 bridges  -DP      Cueing 5 Verbal  -DP      Sets 5 2  -DP      Reps 5 10  -DP              Exercise 8    Exercise Name 8 knee drop out  -DP      Cueing 8 Verbal;Tactile  -DP      Sets 8 1  -DP       Reps 8 10  -DP            User Key  (r) = Recorded By, (t) = Taken By, (c) = Cosigned By    Initials Name Provider Type    DP Vini Lombardo PT Physical Therapist                                 Therapy Education  Education Details: Pt was instructed to conitnue to use KI and always have someone with him when ambulating  Given: Fall prevention and home safety, Mobility training  Program: Reinforced  How Provided: Verbal, Demonstration  Provided to: Patient, Caregiver  Level of Understanding: Verbalized              Time Calculation:   Start Time: 1500  Stop Time: 1558  Time Calculation (min): 58 min  Total Timed Code Minutes- PT: 58 minute(s)  Timed Charges  60863 - PT Therapeutic Exercise Minutes: 15  00558 - Gait Training Minutes : 30  79755 - PT Therapeutic Activity Minutes: 13  Total Minutes  Timed Charges Total Minutes: 58   Total Minutes: 58  Therapy Charges for Today     Code Description Service Date Service Provider Modifiers Qty    11726181178 HC PT THER PROC EA 15 MIN 7/25/2022 Vini Lombardo PT GP 1    16591855384 HC GAIT TRAINING EA 15 MIN 7/25/2022 Vini Lombardo, PT GP 2    92632513711 HC PT THERAPEUTIC ACT EA 15 MIN 7/25/2022 Vini Lombardo, PT GP 1            Patient was wearing a face mask during this therapy encounter. Therapist used appropriate personal protective equipment including mask and gloves.  Mask used was standard procedure mask. Appropriate PPE was worn during the entire therapy session. Hand hygiene was completed before and after therapy session. Patient is not in enhanced droplet precautions.           Vini Lombardo PT  7/25/2022

## 2022-07-29 ENCOUNTER — ANTICOAGULATION VISIT (OUTPATIENT)
Dept: PHARMACY | Facility: HOSPITAL | Age: 87
End: 2022-07-29

## 2022-07-29 ENCOUNTER — HOSPITAL ENCOUNTER (OUTPATIENT)
Dept: PHYSICAL THERAPY | Facility: HOSPITAL | Age: 87
Setting detail: THERAPIES SERIES
Discharge: HOME | End: 2022-07-29

## 2022-07-29 DIAGNOSIS — I63.411 CEREBROVASCULAR ACCIDENT (CVA) DUE TO EMBOLISM OF RIGHT MIDDLE CEREBRAL ARTERY: ICD-10-CM

## 2022-07-29 DIAGNOSIS — M62.81 QUADRICEPS WEAKNESS: Primary | ICD-10-CM

## 2022-07-29 DIAGNOSIS — R26.89 FUNCTIONAL GAIT ABNORMALITY: ICD-10-CM

## 2022-07-29 DIAGNOSIS — Z95.2 H/O HEART VALVE REPLACEMENT WITH MECHANICAL VALVE: Primary | ICD-10-CM

## 2022-07-29 DIAGNOSIS — Z91.81 RISK FOR FALLS: ICD-10-CM

## 2022-07-29 DIAGNOSIS — S70.12XA HEMATOMA OF ILIOPSOAS MUSCLE, LEFT, INITIAL ENCOUNTER: ICD-10-CM

## 2022-07-29 LAB — INR PPP: 3.5

## 2022-07-29 PROCEDURE — 97116 GAIT TRAINING THERAPY: CPT

## 2022-07-29 PROCEDURE — 97530 THERAPEUTIC ACTIVITIES: CPT

## 2022-07-29 NOTE — PROGRESS NOTES
Anticoagulation Clinic Progress Note    Anticoagulation Summary  As of 7/29/2022    INR goal:  3.0-3.5   TTR:  67.2 % (3.5 y)   INR used for dosing:  3.50 (7/29/2022)   Warfarin maintenance plan:  5 mg every Thu; 7.5 mg all other days   Weekly warfarin total:  50 mg   No change documented:  Shannon Flor, Pharmacy Technician   Plan last modified:  Michelle Piña RPH (7/8/2022)   Next INR check:  8/5/2022   Priority:  High   Target end date:  Indefinite    Indications    H/O heart valve replacement with mechanical valve [Z95.2]  Atrial fibrillation (HCC) [I48.91]  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]             Anticoagulation Episode Summary     INR check location:      Preferred lab:      Send INR reminders to:  [facility]  POOL    Comments:  New INR goal 3 - 3.5 (see 1/2020 hospitalization for CVA)      Anticoagulation Care Providers     Provider Role Specialty Phone number    Griffin Fried MD Referring Cardiology [phone]          Clinic Interview:  No pertinent clinical findings have been reported.    INR History:  Anticoagulation Monitoring 7/15/2022 7/22/2022 7/29/2022   INR 3.20 3.30 3.50   INR Date 7/15/2022 7/22/2022 7/29/2022   INR Goal 3.0-3.5 3.0-3.5 3.0-3.5   Trend Same Same Same   Last Week Total 50 mg 50 mg 50 mg   Next Week Total 50 mg 50 mg 50 mg   Sun 7.5 mg 7.5 mg 7.5 mg   Mon 7.5 mg 7.5 mg 7.5 mg   Tue 7.5 mg 7.5 mg 7.5 mg   Wed 7.5 mg 7.5 mg 7.5 mg   Thu 5 mg 5 mg 5 mg   Fri 7.5 mg 7.5 mg 7.5 mg   Sat 7.5 mg 7.5 mg 7.5 mg   Visit Report - - -   Some recent data might be hidden       Plan:  1. INR is therapeutic today- see above in Anticoagulation Summary.    Francisco Sequeira to continue their warfarin regimen- see above in Anticoagulation Summary.  2. Follow up in 1 week  3. Pt has agreed to only be called if INR out of range. They have been instructed to call if any changes in medications, doses, concerns,  etc. Patient expresses understanding and has no further questions at this time.    Shnanon Flor, Pharmacy Technician

## 2022-07-29 NOTE — THERAPY TREATMENT NOTE
Outpatient Physical Therapy Neuro Treatment Note  [facility]     Patient Name: Francisco Sequeira  : 1935  MRN: [id number]  Today's Date: 2022      Visit Date: 2022    Visit Dx:    ICD-10-CM ICD-9-CM   1. Quadriceps weakness  M62.81 728.87   2. Functional gait abnormality  R26.89 781.2   3. Risk for falls  Z91.81 V15.88   4. 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   • Kidney carcinoma (HCC)   • Stage 3b chronic kidney disease (HCC)   • BYRON (acute kidney injury) (HCC)   • 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)   • Iron deficiency anemia   • Chronic anticoagulation   • Hemiparesis of left nondominant side as late effect of cerebral infarction (HCC)   • Rectus sheath hematoma   • Hospital discharge follow-up   • Sepsis (HCC)   • Calculus of gallbladder with acute cholecystitis without obstruction   • History of Clostridium difficile infection   • Aspiration pneumonitis (HCC)   • Hematoma of iliopsoas muscle, left, initial encounter   • History of CVA (cerebrovascular accident)   • S/P laparoscopic cholecystectomy   • Anemia   • Hematoma of left iliopsoas muscle            PT Neuro     Row Name 22 1458             Subjective Comments    Subjective Comments Pt received a new locking brace for left LE  -LB              Precautions and Contraindications    Precautions/Limitations fall precautions  -LB      Precautions Significant quad weakness  -LB              Subjective Pain    Able to rate subjective pain? yes  -LB      Pre-Treatment Pain Level 0  -LB      Post-Treatment Pain Level 0  -LB              Cognition    Overall Cognitive Status  WFL  -LB              Posture/Observations    Forward Head Moderate  -LB      Rounded Shoulders Moderate  -LB      Posture/Observations Comments PT arrived to unit in transport wheelchair.  Spouse present  -LB              Transfers    Sit-Stand Eastern (Transfers) verbal cues;contact guard;minimum assist (75% patient effort)  -LB      Stand-Sit Eastern (Transfers) verbal cues;contact guard;minimum assist (75% patient effort)  -LB      Transfers, Sit-Stand-Sit, Assist Device standard walker  left knee brace  -LB              Gait/Stairs (Locomotion)    Eastern Level (Gait) verbal cues;contact guard;minimum assist (75% patient effort)  -LB      Assistive Device (Gait) walker, front-wheeled  left knee brace  -LB      Distance in Feet (Gait) 80; 50 x 4  -LB      Pattern (Gait) step-to;step-through  -LB      Deviations/Abnormal Patterns (Gait) bilateral deviations;base of support, narrow;gait speed decreased;stride length decreased  -LB      Bilateral Gait Deviations forward flexed posture;heel strike decreased;weight shift ability decreased  -LB      Gait Assessment/Intervention First 2 trials at walking, used brace in locked position; 3 x 50 ft walking without brace being locked position.  Pt had one instance of buckling but the brace prevented full buckling and no outside assist needed.  -LB              Curb Negotiation (Mobility)    Eastern, Curb Negotiation verbal cues;contact guard  -LB      Assistive Device (Curb Negotiation) walker, front-wheeled  left knee brace  -LB              Balance Skills Training    Hip Strategy Assessment (Balance) Pt lifted left LE on 2 inch step and performed one step up with Rwx and Min  -LB            User Key  (r) = Recorded By, (t) = Taken By, (c) = Cosigned By    Initials Name Provider Type    Nalini Grey PT Physical Therapist                         PT Assessment/Plan     Row Name 07/29/22 5826          PT Assessment    Assessment Comments The patient  did well the new knee brace.  The brace was well fitted and did not fall down in addition has a light neoprene sleeve on the knee for more protection and maintain position of brace.  While walking with brace in locked position, pt performed ambulation with CGA with good knee control.  With the brace in unlocked position, the patient did have slight buckling but the brace provided good support.  -LB           User Key  (r) = Recorded By, (t) = Taken By, (c) = Cosigned By    Initials Name Provider Type    Nalini Grey PT Physical Therapist                    OP Exercises     Row Name 07/29/22 1631 07/29/22 1458          Subjective Comments    Subjective Comments -- Pt received a new locking brace for left LE  -LB            Subjective Pain    Able to rate subjective pain? -- yes  -LB     Pre-Treatment Pain Level -- 0  -LB     Post-Treatment Pain Level -- 0  -LB            Total Minutes    87170 - Gait Training Minutes  30  -LB --     00637 - PT Therapeutic Activity Minutes 14  -LB --           User Key  (r) = Recorded By, (t) = Taken By, (c) = Cosigned By    Initials Name Provider Type    Nalini Grey PT Physical Therapist                                Therapy Education  Education Details: Walking with brace in locked and unlocked position  Given: Mobility training  Program: New  How Provided: Verbal  Provided to: Patient  Level of Understanding: Verbalized, Demonstrated              Time Calculation:   Start Time: 1430  Stop Time: 1515  Time Calculation (min): 45 min  Timed Charges  83126 - Gait Training Minutes : 30  73377 - PT Therapeutic Activity Minutes: 14  Total Minutes  Timed Charges Total Minutes: 44   Total Minutes: 44   Therapy Charges for Today     Code Description Service Date Service Provider Modifiers Qty    [id number] HC GAIT TRAINING EA 15 MIN 7/29/2022 Nalini Cano PT GP 2    [id number]  PT THERAPEUTIC ACT EA 15 MIN 7/29/2022 Nalini Cano PT GP 1        Patient was wearing a face  mask during this therapy encounter. Therapist used appropriate personal protective equipment including mask and gloves.  Mask used was standard procedure mask. Appropriate PPE was worn during the entire therapy session. Hand hygiene was completed before and after therapy session. Patient is not in enhanced droplet precautions.                 Nalini Cano, PT  7/29/2022

## 2022-08-01 ENCOUNTER — HOSPITAL ENCOUNTER (OUTPATIENT)
Dept: PHYSICAL THERAPY | Facility: HOSPITAL | Age: 87
Setting detail: THERAPIES SERIES
Discharge: HOME | End: 2022-08-01

## 2022-08-01 DIAGNOSIS — R26.89 FUNCTIONAL GAIT ABNORMALITY: ICD-10-CM

## 2022-08-01 DIAGNOSIS — M62.81 QUADRICEPS WEAKNESS: Primary | ICD-10-CM

## 2022-08-01 DIAGNOSIS — Z91.81 RISK FOR FALLS: ICD-10-CM

## 2022-08-01 DIAGNOSIS — S70.12XA HEMATOMA OF ILIOPSOAS MUSCLE, LEFT, INITIAL ENCOUNTER: ICD-10-CM

## 2022-08-01 PROCEDURE — 97110 THERAPEUTIC EXERCISES: CPT

## 2022-08-01 PROCEDURE — 97116 GAIT TRAINING THERAPY: CPT

## 2022-08-01 NOTE — THERAPY TREATMENT NOTE
Outpatient Physical Therapy Neuro Treatment Note  [facility]     Patient Name: Francisco Sequeira  : 1935  MRN: [id number]  Today's Date: 2022      Visit Date: 2022    Visit Dx:    ICD-10-CM ICD-9-CM   1. Quadriceps weakness  M62.81 728.87   2. Functional gait abnormality  R26.89 781.2   3. Risk for falls  Z91.81 V15.88   4. 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   • Kidney carcinoma (HCC)   • Stage 3b chronic kidney disease (HCC)   • BYRON (acute kidney injury) (HCC)   • 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)   • Iron deficiency anemia   • Chronic anticoagulation   • Hemiparesis of left nondominant side as late effect of cerebral infarction (HCC)   • Rectus sheath hematoma   • Hospital discharge follow-up   • Sepsis (HCC)   • Calculus of gallbladder with acute cholecystitis without obstruction   • History of Clostridium difficile infection   • Aspiration pneumonitis (HCC)   • Hematoma of iliopsoas muscle, left, initial encounter   • History of CVA (cerebrovascular accident)   • S/P laparoscopic cholecystectomy   • Anemia   • Hematoma of left iliopsoas muscle            PT Neuro     Row Name 22 1533             Subjective Comments    Subjective Comments Nothing new, doing okay  -LB              Precautions and Contraindications    Precautions/Limitations fall precautions  -LB      Precautions Significant quad weakness  -LB              Subjective Pain    Able to rate subjective pain? yes  -LB      Pre-Treatment Pain Level 0  -LB      Post-Treatment Pain Level 0  -LB              Cognition    Overall Cognitive Status WFL  -LB               Posture/Observations    Posture/Observations Comments PT arrived to unit in transport wheelchair.  Spouse present  -LB              Transfers    Sit-Stand Waldo (Transfers) verbal cues;contact guard;minimum assist (75% patient effort)  -LB      Stand-Sit Waldo (Transfers) verbal cues;contact guard;minimum assist (75% patient effort)  -LB      Transfers, Sit-Stand-Sit, Assist Device rolling walker  -LB              Gait/Stairs (Locomotion)    Waldo Level (Gait) verbal cues;contact guard;minimum assist (75% patient effort)  -LB      Assistive Device (Gait) walker, front-wheeled  -LB      Distance in Feet (Gait) 160  -LB      Pattern (Gait) step-to;step-through  -LB      Deviations/Abnormal Patterns (Gait) bilateral deviations;base of support, narrow;gait speed decreased;stride length decreased  -LB      Bilateral Gait Deviations forward flexed posture;heel strike decreased;weight shift ability decreased  -LB      Left Sided Gait Deviations weight shift ability decreased  -LB      Gait Assessment/Intervention Knee brace was not locked, no buckling noted with brace  -LB              Balance Skills Training    Gait Balance-Level of Assistance Contact guard;Minimum assistance  -LB      Gait Balance Support Left upper extremity supported;Right upper extremity supported;parallel bars  -LB      Gait Balance Activities side-stepping  -LB      Gait Balance # of Minutes 10 ft times 4  -LB            User Key  (r) = Recorded By, (t) = Taken By, (c) = Cosigned By    Initials Name Provider Type    Nalini Grey, PT Physical Therapist                         PT Assessment/Plan     Row Name 08/01/22 1258          PT Assessment    Assessment Comments The patient has slightly increased strength in left hip flexors but no change noted in quad strength.  All activities in therapy were completed with the brace unlocked and did not feel any bucking occur.  The patient needs cues for more upright positioning  in  standing and walking to help with knee control.  -LB           User Key  (r) = Recorded By, (t) = Taken By, (c) = Cosigned By    Initials Name Provider Type    Nalini Grey PT Physical Therapist                    OP Exercises     Row Name 08/01/22 1626 08/01/22 1533          Subjective Comments    Subjective Comments -- Nothing new, doing okay  -LB            Subjective Pain    Able to rate subjective pain? -- yes  -LB     Pre-Treatment Pain Level -- 0  -LB     Post-Treatment Pain Level -- 0  -LB            Total Minutes    12194 - Gait Training Minutes  18  -LB --     69245 - PT Therapeutic Exercise Minutes 27  -LB --            Exercise 4    Exercise Name 4 -- SAQs  -LB     Sets 4 -- 1  -LB     Reps 4 -- 10  -LB     Additional Comments -- wtith max A  -LB            Exercise 5    Exercise Name 5 -- bridges  -LB     Sets 5 -- 4  -LB     Reps 5 -- 5  -LB            Exercise 6    Exercise Name 6 -- seated knee flexion and extension  -LB     Sets 6 -- 1  -LB     Reps 6 -- 10  -LB     Additional Comments -- elevated mat, max A for extension  -LB            Exercise 7    Exercise Name 7 -- SKTC  -LB     Sets 7 -- 2  -LB     Reps 7 -- 10  -LB     Additional Comments -- with assist  -LB            Exercise 8    Exercise Name 8 -- knee drop out  -LB     Sets 8 -- 2  -LB     Reps 8 -- 10  -LB            Exercise 9    Exercise Name 9 -- SLR  -LB     Cueing 9 -- Verbal;Tactile  -LB     Sets 9 -- 1  -LB     Reps 9 -- 10  -LB     Additional Comments -- with max A  -LB            Exercise 10    Exercise Name 10 -- left foot placement on 4 inch step  -LB     Sets 10 -- 1  -LB     Reps 10 -- 5  -LB     Additional Comments -- UE support  -LB            Exercise 11    Exercise Name 11 -- left forward step ups  -LB     Sets 11 -- 1  -LB     Reps 11 -- 5  -LB     Additional Comments -- UE support and Assist  -LB           User Key  (r) = Recorded By, (t) = Taken By, (c) = Cosigned By    Initials Name Provider Type    ALYCE Cano  Nalini INFANTE PT Physical Therapist                                Therapy Education  Education Details: sit to stand  Given: Mobility training  Program: Reinforced, Progressed  How Provided: Verbal, Demonstration  Provided to: Patient  Level of Understanding: Verbalized, Teach back education performed              Time Calculation:   Start Time: 1515  Stop Time: 1600  Time Calculation (min): 45 min  Timed Charges  61014 - PT Therapeutic Exercise Minutes: 27  10545 - Gait Training Minutes : 18  Total Minutes  Timed Charges Total Minutes: 45   Total Minutes: 45   Therapy Charges for Today     Code Description Service Date Service Provider Modifiers Qty    85236771729  PT THER PROC EA 15 MIN 8/1/2022 Nalini Cano PT GP 2    [id number]  GAIT TRAINING EA 15 MIN 8/1/2022 Nalini Cano, PT GP 1            Patient was wearing a face mask during this therapy encounter. Therapist used appropriate personal protective equipment including mask and gloves.  Mask used was standard procedure mask. Appropriate PPE was worn during the entire therapy session. Hand hygiene was completed before and after therapy session. Patient is not in enhanced droplet precautions.             Nalini Cano PT  8/1/2022

## 2022-08-03 ENCOUNTER — OFFICE VISIT (OUTPATIENT)
Dept: CARDIOLOGY | Facility: CLINIC | Age: 87
End: 2022-08-03

## 2022-08-03 VITALS
OXYGEN SATURATION: 96 % | BODY MASS INDEX: 17.36 KG/M2 | DIASTOLIC BLOOD PRESSURE: 64 MMHG | HEIGHT: 72 IN | SYSTOLIC BLOOD PRESSURE: 130 MMHG | HEART RATE: 82 BPM

## 2022-08-03 DIAGNOSIS — Z95.4 HISTORY OF AORTIC VALVE REPLACEMENT WITH METALLIC VALVE: ICD-10-CM

## 2022-08-03 DIAGNOSIS — I48.21 PERMANENT ATRIAL FIBRILLATION: Primary | ICD-10-CM

## 2022-08-03 DIAGNOSIS — I10 PRIMARY HYPERTENSION: Chronic | ICD-10-CM

## 2022-08-03 PROCEDURE — 99214 OFFICE O/P EST MOD 30 MIN: CPT | Performed by: INTERNAL MEDICINE

## 2022-08-04 PROBLEM — Z95.4 HISTORY OF AORTIC VALVE REPLACEMENT WITH METALLIC VALVE: Status: ACTIVE | Noted: 2018-09-19

## 2022-08-04 NOTE — PROGRESS NOTES
"      CARDIOLOGY    Griffin Fried MD    ENCOUNTER DATE:  08/03/2022    Francisco Sequeira / 86 y.o. / male        CHIEF COMPLAINT / REASON FOR OFFICE VISIT     Permanent atrial fibrillation  (03/16/2022 Follow up)  Hypertension    HISTORY OF PRESENT ILLNESS       HPI  Francisco Sequeira is a 86 y.o. male who presents today for reevaluation.  From a cardiovascular standpoint he is actually doing well.  He denies chest pain shortness breath palpitations lightheadedness swelling or fatigue.  Unfortunately is having issues with his knee he has a lot of immobility secondary to that.  He is actually working very hard on trying to get his function back.  Patient has history of mechanical aortic valve.  His INR target has been higher due to the fact that he had a stroke when his INR was at 2.2.  From this aspect he is doing well his INR is staying between 3 and 3.5.  He also has history of GI bleeding which she has not had any recently.      The following portions of the patient's history were reviewed and updated as appropriate: allergies, current medications, past family history, past medical history, past social history, past surgical history and problem list.      VITAL SIGNS     Visit Vitals  /64 (BP Location: Left arm)   Pulse 82   Ht 182.9 cm (72\")   SpO2 96%   BMI 17.36 kg/m²         Wt Readings from Last 3 Encounters:   05/24/22 58.1 kg (128 lb)   05/16/22 58.6 kg (129 lb 3 oz)   04/11/22 68 kg (150 lb)     Body mass index is 17.36 kg/m².      REVIEW OF SYSTEMS   ROS        PHYSICAL EXAMINATION     Vitals reviewed.   Constitutional:       Appearance: Not in distress.   Pulmonary:      Breath sounds: Normal breath sounds.   Cardiovascular:      Normal rate. Irregularly irregular rhythm. Normal S1. Normal S2.      Murmurs: There is no murmur.      No gallop. No click. No rub.      Comments: Artificial valve click  Pulses:     Intact distal pulses.   Edema:     Peripheral edema absent.   Neurological:      " Mental Status: Alert and oriented to person, place and time.           REVIEWED DATA     Procedures    Cardiac Procedures:  1.     Lipid Panel    Lipid Panel 10/24/21   Total Cholesterol 80   Triglycerides 57   HDL Cholesterol 38 (A)   VLDL Cholesterol 14   LDL Cholesterol  28   LDL/HDL Ratio 0.81   (A) Abnormal value                ASSESSMENT & PLAN      Diagnosis Plan   1. Permanent atrial fibrillation (HCC)     2. Primary hypertension           SUMMARY/DISCUSSION  1. Chronic atrial fibrillation.  Clinically stable doing well no issues heart rate is stable.  2. Metallic aortic valve.  Patient had a stroke with his INR at 2.2.  His goal therefore is 3-3.5.  3. Hypertension blood pressures good  4. Immobility he continues to work on his left knee and getting strength back.  5. This point I will follow back up with him in a year sooner if he has issues.        MEDICATIONS         Discharge Medications          Accurate as of August 3, 2022 11:59 PM. If you have any questions, ask your nurse or doctor.            Continue These Medications      Instructions Start Date   acetaminophen 325 MG tablet  Commonly known as: TYLENOL   650 mg, Oral, Every 6 Hours PRN      atorvastatin 40 MG tablet  Commonly known as: LIPITOR   TAKE ONE TABLET BY MOUTH DAILY      betamethasone dipropionate 0.05 % ointment  Commonly known as: DIPROLENE   1 application, Topical, Every 12 Hours Scheduled      colestipol 1 g tablet  Commonly known as: COLESTID   TAKE ONE TABLET BY MOUTH TWICE A DAY      Digoxin 62.5 MCG tablet   62.5 mcg, Oral, Daily Digoxin      diphenhydrAMINE 25 mg capsule  Commonly known as: BENADRYL   25 mg, Oral, 2 Times Daily PRN      famotidine 20 MG tablet  Commonly known as: PEPCID   20 mg, Oral, Daily      ferrous gluconate 324 MG tablet  Commonly known as: FERGON   324 mg, Oral, Every Other Day      furosemide 20 MG tablet  Commonly known as: LASIX   TAKE TWO TABLETS BY MOUTH EVERY MORNING AND TAKE ONE TABLET BY MOUTH  IN THE EVENING      Insulin Lispro 100 UNIT/ML injection  Commonly known as: HumaLOG   Use as directed with v-go pump      insulin lispro 100 UNIT/ML injection  Commonly known as: humaLOG   Subcutaneous, Take As Directed, Use as directed with V-Go Pump--NOT TAKING DUE TO LOW BG LEVELS SINCE HOSPITALIZATION       magnesium oxide 400 MG tablet  Commonly known as: MAG-OX   400 mg, Oral, 2 Times Daily      metoprolol succinate XL 25 MG 24 hr tablet  Commonly known as: TOPROL-XL   12.5 mg, Oral, Daily      nystatin 360425 UNIT/GM powder  Commonly known as: MYCOSTATIN   Topical, Every 12 Hours Scheduled      sodium hypochlorite in sterile water irrigation topical solution 0.0625%   946 mL, Irrigation, Every 12 Hours Scheduled      V-Go 40 kit   USE AS DIRECTED THREE TIMES A DAY      warfarin 7.5 MG tablet  Commonly known as: COUMADIN   Once per day on Sun Tue Wed Thu Sat.  INR goal 3.0-3.5.      warfarin 5 MG tablet  Commonly known as: COUMADIN   Once per day on Mon and Fri. INR goal 3.0-3.5.                 **Dragon Disclaimer:   Much of this encounter note is an electronic transcription/translation of spoken language to printed text. The electronic translation of spoken language may permit erroneous, or at times, nonsensical words or phrases to be inadvertently transcribed. Although I have reviewed the note for such errors, some may still exist.

## 2022-08-05 ENCOUNTER — ANTICOAGULATION VISIT (OUTPATIENT)
Dept: PHARMACY | Facility: HOSPITAL | Age: 87
End: 2022-08-05

## 2022-08-05 ENCOUNTER — HOSPITAL ENCOUNTER (OUTPATIENT)
Dept: PHYSICAL THERAPY | Facility: HOSPITAL | Age: 87
Setting detail: THERAPIES SERIES
Discharge: HOME | End: 2022-08-05

## 2022-08-05 DIAGNOSIS — M62.81 QUADRICEPS WEAKNESS: Primary | ICD-10-CM

## 2022-08-05 DIAGNOSIS — R26.89 FUNCTIONAL GAIT ABNORMALITY: ICD-10-CM

## 2022-08-05 DIAGNOSIS — I63.411 CEREBROVASCULAR ACCIDENT (CVA) DUE TO EMBOLISM OF RIGHT MIDDLE CEREBRAL ARTERY: ICD-10-CM

## 2022-08-05 DIAGNOSIS — Z95.4 HISTORY OF AORTIC VALVE REPLACEMENT WITH METALLIC VALVE: Primary | ICD-10-CM

## 2022-08-05 DIAGNOSIS — Z91.81 RISK FOR FALLS: ICD-10-CM

## 2022-08-05 DIAGNOSIS — S70.12XA HEMATOMA OF ILIOPSOAS MUSCLE, LEFT, INITIAL ENCOUNTER: ICD-10-CM

## 2022-08-05 LAB — INR PPP: 3.5

## 2022-08-05 PROCEDURE — 97116 GAIT TRAINING THERAPY: CPT

## 2022-08-05 PROCEDURE — 97110 THERAPEUTIC EXERCISES: CPT

## 2022-08-05 NOTE — PROGRESS NOTES
Anticoagulation Clinic Progress Note    Anticoagulation Summary  As of 8/5/2022    INR goal:  3.0-3.5   TTR:  67.4 % (3.6 y)   INR used for dosing:  3.50 (8/5/2022)   Warfarin maintenance plan:  5 mg every Thu; 7.5 mg all other days   Weekly warfarin total:  50 mg   No change documented:  Natty Sullivan   Plan last modified:  Michelle Piña RPH (7/8/2022)   Next INR check:  8/12/2022   Priority:  High   Target end date:  Indefinite    Indications    History of aortic valve replacement with metallic valve [Z95.4]  Atrial fibrillation (HCC) [I48.91]  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]             Anticoagulation Episode Summary     INR check location:      Preferred lab:      Send INR reminders to:  [facility]  POOL    Comments:  New INR goal 3 - 3.5 (see 1/2020 hospitalization for CVA)      Anticoagulation Care Providers     Provider Role Specialty Phone number    Griffin Fried MD Referring Cardiology [phone]          Clinic Interview:  No pertinent clinical findings have been reported.    INR History:  Anticoagulation Monitoring 7/22/2022 7/29/2022 8/5/2022   INR 3.30 3.50 3.50   INR Date 7/22/2022 7/29/2022 8/5/2022   INR Goal 3.0-3.5 3.0-3.5 3.0-3.5   Trend Same Same Same   Last Week Total 50 mg 50 mg 50 mg   Next Week Total 50 mg 50 mg 50 mg   Sun 7.5 mg 7.5 mg 7.5 mg   Mon 7.5 mg 7.5 mg 7.5 mg   Tue 7.5 mg 7.5 mg 7.5 mg   Wed 7.5 mg 7.5 mg 7.5 mg   Thu 5 mg 5 mg 5 mg   Fri 7.5 mg 7.5 mg 7.5 mg   Sat 7.5 mg 7.5 mg 7.5 mg   Visit Report - - -   Some recent data might be hidden       Plan:  1. INR is therapeutic today- see above in Anticoagulation Summary.    Francisco Sequeira to continue their warfarin regimen- see above in Anticoagulation Summary.  2. Follow up in 1 week  3. Pt has agreed to only be called if INR out of range. They have been instructed to call if any changes in medications, doses, concerns, etc.  Patient expresses understanding and has no further questions at this time.    Natty Sullivan

## 2022-08-05 NOTE — THERAPY PROGRESS REPORT/RE-CERT
Outpatient Physical Therapy Neuro Progress Note  [facility]     Patient Name: Francisco Sequeira  : 1935  MRN: [id number]  Today's Date: 2022      Visit Date: 2022    Visit Dx:    ICD-10-CM ICD-9-CM   1. Quadriceps weakness  M62.81 728.87   2. Functional gait abnormality  R26.89 781.2   3. Risk for falls  Z91.81 V15.88   4. Hematoma of iliopsoas muscle, left, initial encounter  S70.12XA 924.00       Patient Active Problem List   Diagnosis   • Atrial fibrillation (HCC)   • Hypertension   • Atopic rhinitis   • Gastroesophageal reflux disease   • Hyperlipidemia   • Type 2 diabetes mellitus (HCC)   • Low testosterone   • History of aortic valve replacement with metallic valve   • Kidney carcinoma (HCC)   • Stage 3b chronic kidney disease (HCC)   • BYRON (acute kidney injury) (HCC)   • 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)   • Iron deficiency anemia   • Chronic anticoagulation   • Hemiparesis of left nondominant side as late effect of cerebral infarction (HCC)   • Rectus sheath hematoma   • Hospital discharge follow-up   • Sepsis (HCC)   • Calculus of gallbladder with acute cholecystitis without obstruction   • History of Clostridium difficile infection   • Aspiration pneumonitis (HCC)   • Hematoma of iliopsoas muscle, left, initial encounter   • History of CVA (cerebrovascular accident)   • S/P laparoscopic cholecystectomy   • Anemia   • Hematoma of left iliopsoas muscle            PT Neuro     Row Name 22 6385             Subjective Comments    Subjective Comments Things are going well.  Walking more with the brace in unlocked position  -LB              Precautions and Contraindications    Precautions/Limitations fall precautions  -LB      Precautions Significant quad weakness  -LB              Subjective Pain    Able to rate subjective pain? yes  -LB      Pre-Treatment Pain Level 0  -LB      Post-Treatment Pain Level 0  -LB               Cognition    Overall Cognitive Status WFL  -LB              Posture/Observations    Posture/Observations Comments PT arrived to unit in transport wheelchair.  Spouse present.  Pt wearing knee brace.  Brace with only 1 band on each side  -LB              Transfers    Sit-Stand Clayton (Transfers) standby assist  -LB      Stand-Sit Clayton (Transfers) standby assist  -LB      Transfers, Sit-Stand-Sit, Assist Device rolling walker  -LB      Comment, (Transfers) Pt was indep with locking and unlocking brace when needed  -LB              Gait/Stairs (Locomotion)    Clayton Level (Gait) verbal cues;contact guard  -LB      Assistive Device (Gait) walker, front-wheeled  knee brace in locked and unlocked position  -LB      Distance in Feet (Gait) 150; 75 x 2  -LB      Pattern (Gait) step-through  -LB      Deviations/Abnormal Patterns (Gait) bilateral deviations;base of support, narrow;gait speed decreased;stride length decreased  -LB      Bilateral Gait Deviations forward flexed posture;heel strike decreased;weight shift ability decreased  -LB      Gait Assessment/Intervention No buckling while walking with knee brace in locked or unlocked position.  Pt is advancing left LE well with good knee flexion in swing and knee extension in stance  -LB              Curb Negotiation (Mobility)    Clayton, Curb Negotiation verbal cues;contact guard  -LB      Assistive Device (Curb Negotiation) walker, front-wheeled  knee brace  -LB      Comment, Curb Negotiation (Mobility) no vc needed for sequence or placement of walker or feet  -LB              Balance Skills Training    Gait Balance-Level of Assistance Contact guard;Minimum assistance  -LB      Gait Balance Support Right upper extremity supported;Left upper extremity supported;danny bar  -LB      Gait Balance Activities side-stepping  -LB      Gait Balance # of Minutes 10 ft times 4, slight knee buckling times 2 but pt able to maintain  -LB      Stepping Strategy  Assessment (Balance) Attempted lateral step up with left LE, pt unable to complete and a chair had to be brought up for the patient to sit down.  -LB            User Key  (r) = Recorded By, (t) = Taken By, (c) = Cosigned By    Initials Name Provider Type    Nalini Grey PT Physical Therapist                         PT Assessment/Plan     Row Name 08/05/22 1539          PT Assessment    Assessment Comments The patient has made good preogress toward all STGs, achieving most of them.  Left hip flexion strength continues to improve but no changes with quad strength.  The knee brace is fitting now to allow improved and safer mobility at home.  Walking mostly in therapy with the brace in unlocked position and the patient can maintain stability due to improved positioning of the foot and with glut and hamstring strength.  Both patient and spouse are pleased with progress.  Pt is very compliant with HEP and walking with assist at home.  New STGs made and progress toward LTGs  -LB           User Key  (r) = Recorded By, (t) = Taken By, (c) = Cosigned By    Initials Name Provider Type    Nalini Grey PT Physical Therapist                    OP Exercises     Row Name 08/05/22 1544 08/05/22 1515          Subjective Comments    Subjective Comments -- Things are going well.  Walking more with the brace in unlocked position  -LB            Subjective Pain    Able to rate subjective pain? -- yes  -LB     Pre-Treatment Pain Level -- 0  -LB     Post-Treatment Pain Level -- 0  -LB            Total Minutes    26025 - Gait Training Minutes  14  -LB --     11266 - PT Therapeutic Exercise Minutes 30  -LB --            Exercise 6    Exercise Name 6 -- seated knee flexion and extension  -LB     Sets 6 -- 1  -LB     Reps 6 -- 10  -LB     Additional Comments -- with assist  -LB            Exercise 11    Exercise Name 11 -- left forward step ups  -LB     Sets 11 -- 1  -LB     Reps 11 -- 5  -LB     Additional Comments -- Pt pulling  with UEs  -LB            Exercise 12    Exercise Name 12 -- sit to stand  -LB     Cueing 12 -- Verbal;Tactile  -LB     Reps 12 -- 10  -LB     Additional Comments -- Manual assist to engage left knee extension, elevated mat  -LB           User Key  (r) = Recorded By, (t) = Taken By, (c) = Cosigned By    Initials Name Provider Type    LB Nalini Cano, PT Physical Therapist                             PT OP Goals     Row Name 08/05/22 1500          PT Short Term Goals    STG 1 Pt to come to stand from an arm chair to his walker  and left kne brace conditional independent.  -LB     STG 1 Progress Progressing;Ongoing  -LB     STG 2 Pt jennifer be indep with HEP to maintain progress in therapy  -LB     STG 2 Progress Met  -LB     STG 3 Pt to ambulate 150 ft with left knee brace Rwx and CGA  -LB     STG 3 Progress Met  -LB     STG 4 Assist pt with modification to left dynamic knee extension brace  -LB     STG 4 Progress Met  -LB     STG 5 Pt will negogiate curb with Rwx CGA and no VC  -LB     STG 5 Progress Met  -LB     Additional STG's STG 6;STG 7;STG 8  -LB     STG 6 Pt will come to stand from armless chair and Min  -LB     STG 7 Pt will ambulate 160 ft with FWW, knee brace in unlocked position and CGA  -LB     STG 8 Pt will perform a TUG in 30 secs  -LB            Long Term Goals    LTG Date to Achieve 10/03/22  -LB     LTG 1 Pt to come to stand from an armless chair with Rwx conditional independent.  -LB     LTG 1 Progress Ongoing  -LB     LTG 2 Pt to complete the TUG in < 30 seconds while ambulating with FWW and knee brace.  -LB     LTG 2 Progress Ongoing  -LB     LTG 3 Pt will increase left knee extension to 2/5  -LB     LTG 3 Progress Ongoing  -LB     LTG 4 Pt to improve score on the TInetti  to 18/28 improve balance and reduce risk of falls.  -LB     LTG 4 Progress Ongoing  -LB     LTG 5 Pt will be indep with progression of HEP  -LB     LTG 5 Progress Ongoing  -LB     LTG 6 Pt to ascend and descend 4 steps with HRs and  CGA.  -LB     LTG 6 Progress Ongoing  -LB     LTG 7 Pt will ambulate household distances with left knee brace FWW and conditional independence  -LB     LTG 7 Progress Ongoing  -LB            Time Calculation    PT Goal Re-Cert Due Date 09/02/22  -LB           User Key  (r) = Recorded By, (t) = Taken By, (c) = Cosigned By    Initials Name Provider Type    Nalini Grey, PT Physical Therapist                Therapy Education  Education Details: curb  Given: Mobility training  Program: Reinforced  How Provided: Verbal  Provided to: Patient  Level of Understanding: Verbalized, Teach back education performed              Time Calculation:   Start Time: 1430  Stop Time: 1514  Time Calculation (min): 44 min  Timed Charges  76359 - PT Therapeutic Exercise Minutes: 30  67733 - Gait Training Minutes : 14  Total Minutes  Timed Charges Total Minutes: 44   Total Minutes: 44   Therapy Charges for Today     Code Description Service Date Service Provider Modifiers Qty    95333705988  PT THER PROC EA 15 MIN 8/5/2022 Nalini Cano, PT GP 2    [id number] HC GAIT TRAINING EA 15 MIN 8/5/2022 Nalini Cano, PT GP 1              Patient was wearing a face mask during this therapy encounter. Therapist used appropriate personal protective equipment including mask and gloves.  Mask used was standard procedure mask. Appropriate PPE was worn during the entire therapy session. Hand hygiene was completed before and after therapy session. Patient is not in enhanced droplet precautions.         Nalini Cano PT  8/5/2022

## 2022-08-08 ENCOUNTER — HOSPITAL ENCOUNTER (OUTPATIENT)
Dept: PHYSICAL THERAPY | Facility: HOSPITAL | Age: 87
Setting detail: THERAPIES SERIES
Discharge: HOME | End: 2022-08-08

## 2022-08-08 DIAGNOSIS — R26.89 FUNCTIONAL GAIT ABNORMALITY: ICD-10-CM

## 2022-08-08 DIAGNOSIS — S70.12XA HEMATOMA OF ILIOPSOAS MUSCLE, LEFT, INITIAL ENCOUNTER: ICD-10-CM

## 2022-08-08 DIAGNOSIS — M62.81 QUADRICEPS WEAKNESS: Primary | ICD-10-CM

## 2022-08-08 DIAGNOSIS — Z91.81 RISK FOR FALLS: ICD-10-CM

## 2022-08-08 PROCEDURE — 97530 THERAPEUTIC ACTIVITIES: CPT

## 2022-08-08 PROCEDURE — 97110 THERAPEUTIC EXERCISES: CPT

## 2022-08-08 NOTE — THERAPY TREATMENT NOTE
Outpatient Physical Therapy Neuro Treatment Note  [facility]     Patient Name: Francisco Sequeira  : 1935  MRN: [id number]  Today's Date: 2022      Visit Date: 2022    Visit Dx:    ICD-10-CM ICD-9-CM   1. Quadriceps weakness  M62.81 728.87   2. Functional gait abnormality  R26.89 781.2   3. Risk for falls  Z91.81 V15.88   4. Hematoma of iliopsoas muscle, left, initial encounter  S70.12XA 924.00       Patient Active Problem List   Diagnosis   • Atrial fibrillation (HCC)   • Hypertension   • Atopic rhinitis   • Gastroesophageal reflux disease   • Hyperlipidemia   • Type 2 diabetes mellitus (HCC)   • Low testosterone   • History of aortic valve replacement with metallic valve   • Kidney carcinoma (HCC)   • Stage 3b chronic kidney disease (HCC)   • BYRON (acute kidney injury) (HCC)   • 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)   • Iron deficiency anemia   • Chronic anticoagulation   • Hemiparesis of left nondominant side as late effect of cerebral infarction (HCC)   • Rectus sheath hematoma   • Hospital discharge follow-up   • Sepsis (HCC)   • Calculus of gallbladder with acute cholecystitis without obstruction   • History of Clostridium difficile infection   • Aspiration pneumonitis (HCC)   • Hematoma of iliopsoas muscle, left, initial encounter   • History of CVA (cerebrovascular accident)   • S/P laparoscopic cholecystectomy   • Anemia   • Hematoma of left iliopsoas muscle            PT Neuro     Row Name 22 1537             Subjective Comments    Subjective Comments Spouse going back to work tomorrow.  Pt to stay in wheelchair during the day  -LB              Precautions and Contraindications    Precautions/Limitations fall precautions  -LB      Precautions Significant quad weakness  -LB              Subjective Pain    Able to rate subjective pain? yes  -LB      Pre-Treatment Pain Level 0  -LB      Post-Treatment Pain Level 0  -LB               Cognition    Overall Cognitive Status WFL  -LB              Bed Mobility    Supine-Sit [place] (Bed Mobility) minimum assist (75% patient effort)  -LB      Sit-Supine [place] (Bed Mobility) minimum assist (75% patient effort)  -LB              Transfers    Sit-Stand [place] (Transfers) standby assist  -LB      Stand-Sit [place] (Transfers) standby assist  -LB      Transfers, Sit-Stand-Sit, Assist Device rolling walker  -LB              Gait/Stairs (Locomotion)    [place] Level (Gait) verbal cues;contact guard  -LB      Assistive Device (Gait) walker, front-wheeled  dynamic knee brace  -LB      Distance in Feet (Gait) 160; 50 x 2  -LB      Pattern (Gait) step-through  -LB      Deviations/Abnormal Patterns (Gait) bilateral deviations;base of support, narrow;gait speed decreased;stride length decreased  -LB      Bilateral Gait Deviations forward flexed posture;heel strike decreased;weight shift ability decreased  -LB      Left Sided Gait Deviations decreased knee extension  slight knee instability;  -LB            User Key  (r) = Recorded By, (t) = Taken By, (c) = Cosigned By    Initials Name Provider Type    Nalini Grey PT Physical Therapist                         PT Assessment/Plan     Row Name 08/08/22 1636          PT Assessment    Assessment Comments Unable to achieve any quad contraction today during ther ex in seated or supine position.  During ambulation, the patient did have some knee instability but no buckling noted and brace is doing well to assist with knee extension during swing.  Pt's strength during bridges continues to improve to her stabilization of left leg in stance.  Encouraged pt to use wheelchair when spouse is back at work.  -LB           User Key  (r) = Recorded By, (t) = Taken By, (c) = Cosigned By    Initials Name Provider Type    Nalini Grey PT Physical Therapist                    OP Exercises     Row Name 08/08/22 1638 08/08/22 1537          Subjective  Comments    Subjective Comments -- Spouse going back to work tomorrow.  Pt to stay in wheelchair during the day  -LB            Subjective Pain    Able to rate subjective pain? -- yes  -LB     Pre-Treatment Pain Level -- 0  -LB     Post-Treatment Pain Level -- 0  -LB            Total Minutes    13933 - PT Therapeutic Exercise Minutes 30  -LB --     36537 - PT Therapeutic Activity Minutes 12  -LB --            Exercise 2    Exercise Name 2 -- SLR  -LB     Cueing 2 -- Verbal;Tactile  -LB     Sets 2 -- 1  -LB     Reps 2 -- 10  -LB     Additional Comments -- Max A for knee extesion  -LB            Exercise 4    Exercise Name 4 -- SAQs  -LB     Sets 4 -- 1  -LB     Reps 4 -- 10  -LB     Additional Comments -- tried on small and large bolster and peanut ball, max A  -LB            Exercise 5    Exercise Name 5 -- bridges  -LB     Sets 5 -- 2  -LB     Reps 5 -- 10  -LB            Exercise 6    Exercise Name 6 -- seated knee flexion and extension  -LB     Reps 6 -- 10  -LB     Additional Comments -- assist for extension  -LB            Exercise 7    Exercise Name 7 -- SKTC  -LB     Sets 7 -- 2  -LB     Reps 7 -- 10  -LB            Exercise 8    Exercise Name 8 -- knee drop out  -LB     Sets 8 -- 2  -LB     Reps 8 -- 10  -LB            Exercise 12    Exercise Name 12 -- sit to stand  -LB     Cueing 12 -- Verbal;Tactile  -LB     Reps 12 -- 5  -LB     Additional Comments -- Manual assist to engage left knee extension, elevated mat  -LB           User Key  (r) = Recorded By, (t) = Taken By, (c) = Cosigned By    Initials Name Provider Type    Nalini Grey, PT Physical Therapist                                               Time Calculation:   Start Time: 1517  Stop Time: 1600  Time Calculation (min): 43 min  Timed Charges  03943 - PT Therapeutic Exercise Minutes: 30  67021 - PT Therapeutic Activity Minutes: 12  Total Minutes  Timed Charges Total Minutes: 42   Total Minutes: 42   Therapy Charges for Today     Code Description  Service Date Service Provider Modifiers Qty    44015870492  PT THER PROC EA 15 MIN 8/8/2022 Nalini Cano, PT GP 2    [id number]  PT THERAPEUTIC ACT EA 15 MIN 8/8/2022 Nalini Cano, PT GP 1              Patient was wearing a face mask during this therapy encounter. Therapist used appropriate personal protective equipment including mask and gloves.  Mask used was standard procedure mask. Appropriate PPE was worn during the entire therapy session. Hand hygiene was completed before and after therapy session. Patient is not in enhanced droplet precautions.           Nalini Cano, PT  8/8/2022

## 2022-08-12 ENCOUNTER — ANTICOAGULATION VISIT (OUTPATIENT)
Dept: PHARMACY | Facility: HOSPITAL | Age: 87
End: 2022-08-12

## 2022-08-12 ENCOUNTER — HOSPITAL ENCOUNTER (OUTPATIENT)
Dept: PHYSICAL THERAPY | Facility: HOSPITAL | Age: 87
Setting detail: THERAPIES SERIES
Discharge: HOME | End: 2022-08-12

## 2022-08-12 DIAGNOSIS — R26.89 FUNCTIONAL GAIT ABNORMALITY: ICD-10-CM

## 2022-08-12 DIAGNOSIS — Z95.4 HISTORY OF AORTIC VALVE REPLACEMENT WITH METALLIC VALVE: Primary | ICD-10-CM

## 2022-08-12 DIAGNOSIS — I63.411 CEREBROVASCULAR ACCIDENT (CVA) DUE TO EMBOLISM OF RIGHT MIDDLE CEREBRAL ARTERY: ICD-10-CM

## 2022-08-12 DIAGNOSIS — M62.81 QUADRICEPS WEAKNESS: Primary | ICD-10-CM

## 2022-08-12 LAB — INR PPP: 3.3

## 2022-08-12 PROCEDURE — 97116 GAIT TRAINING THERAPY: CPT

## 2022-08-12 PROCEDURE — 97110 THERAPEUTIC EXERCISES: CPT

## 2022-08-12 NOTE — PROGRESS NOTES
Anticoagulation Clinic Progress Note    Anticoagulation Summary  As of 8/12/2022    INR goal:  3.0-3.5   TTR:  67.6 % (3.6 y)   INR used for dosing:  3.30 (8/12/2022)   Warfarin maintenance plan:  5 mg every Thu; 7.5 mg all other days   Weekly warfarin total:  50 mg   No change documented:  Natty Sullivan   Plan last modified:  Michelle Piña RPH (7/8/2022)   Next INR check:  8/19/2022   Priority:  High   Target end date:  Indefinite    Indications    History of aortic valve replacement with metallic valve [Z95.4]  Atrial fibrillation (HCC) [I48.91]  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]             Anticoagulation Episode Summary     INR check location:      Preferred lab:      Send INR reminders to:  [facility]  POOL    Comments:  New INR goal 3 - 3.5 (see 1/2020 hospitalization for CVA)      Anticoagulation Care Providers     Provider Role Specialty Phone number    Griffin Fried MD Referring Cardiology [phone]          Clinic Interview:  No pertinent clinical findings have been reported.    INR History:  Anticoagulation Monitoring 7/29/2022 8/5/2022 8/12/2022   INR 3.50 3.50 3.30   INR Date 7/29/2022 8/5/2022 8/12/2022   INR Goal 3.0-3.5 3.0-3.5 3.0-3.5   Trend Same Same Same   Last Week Total 50 mg 50 mg 50 mg   Next Week Total 50 mg 50 mg 50 mg   Sun 7.5 mg 7.5 mg 7.5 mg   Mon 7.5 mg 7.5 mg 7.5 mg   Tue 7.5 mg 7.5 mg 7.5 mg   Wed 7.5 mg 7.5 mg 7.5 mg   Thu 5 mg 5 mg 5 mg   Fri 7.5 mg 7.5 mg 7.5 mg   Sat 7.5 mg 7.5 mg 7.5 mg   Visit Report - - -   Some recent data might be hidden       Plan:  1. INR is therapeutic today- see above in Anticoagulation Summary.    Francisco Sequeira to continue their warfarin regimen- see above in Anticoagulation Summary.  2. Follow up in 1 week  3. Pt has agreed to only be called if INR out of range. They have been instructed to call if any changes in medications, doses, concerns, etc.  Patient expresses understanding and has no further questions at this time.    Natty Sullivan

## 2022-08-12 NOTE — THERAPY TREATMENT NOTE
Outpatient Physical Therapy Neuro Treatment Note  [facility]     Patient Name: Francisco Sequeira  : 1935  MRN: [id number]  Today's Date: 2022      Visit Date: 2022    Visit Dx:    ICD-10-CM ICD-9-CM   1. Quadriceps weakness  M62.81 728.87   2. Functional gait abnormality  R26.89 781.2       Patient Active Problem List   Diagnosis   • Atrial fibrillation (HCC)   • Hypertension   • Atopic rhinitis   • Gastroesophageal reflux disease   • Hyperlipidemia   • Type 2 diabetes mellitus (HCC)   • Low testosterone   • History of aortic valve replacement with metallic valve   • Kidney carcinoma (HCC)   • Stage 3b chronic kidney disease (HCC)   • BYRON (acute kidney injury) (HCC)   • 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)   • Iron deficiency anemia   • Chronic anticoagulation   • Hemiparesis of left nondominant side as late effect of cerebral infarction (HCC)   • Rectus sheath hematoma   • Hospital discharge follow-up   • Sepsis (HCC)   • Calculus of gallbladder with acute cholecystitis without obstruction   • History of Clostridium difficile infection   • Aspiration pneumonitis (HCC)   • Hematoma of iliopsoas muscle, left, initial encounter   • History of CVA (cerebrovascular accident)   • S/P laparoscopic cholecystectomy   • Anemia   • Hematoma of left iliopsoas muscle            PT Neuro     Row Name 22 6488             Subjective Comments    Subjective Comments Pt things are going well at home.  Pt able to move around home mod I in WC.  -MD              Precautions and Contraindications    Precautions/Limitations fall precautions  -MD      Precautions Significant quad weakness  -MD              Subjective Pain    Able to rate subjective pain? yes  -MD      Pre-Treatment Pain Level 0  -MD      Post-Treatment Pain Level 0  -MD              Cognition    Overall Cognitive Status WFL  -MD              Bed Mobility    Supine-Sit [place] (Bed  Mobility) verbal cues;minimum assist (75% patient effort)  -MD              Transfers    Bed-Chair [place] (Transfers) verbal cues;contact guard  -MD      Sit-Stand [place] (Transfers) verbal cues;contact guard  -MD      Stand-Sit [place] (Transfers) verbal cues;contact guard  -MD      Transfers, Sit-Stand-Sit, Assist Device rolling walker  -MD              Gait/Stairs (Locomotion)    15084 - Gait Training Minutes  10  -MD      [place] Level (Gait) verbal cues;contact guard  -MD      Assistive Device (Gait) walker, front-wheeled  -MD      Distance in Feet (Gait) 160'  -MD      Pattern (Gait) step-through  -MD      Deviations/Abnormal Patterns (Gait) bilateral deviations;base of support, narrow;gait speed decreased;stride length decreased  -MD      Bilateral Gait Deviations forward flexed posture;heel strike decreased;weight shift ability decreased  -MD      Left Sided Gait Deviations decreased knee extension  -MD      Gait Assessment/Intervention hinged knee brace worn while ambulating.  no buckling with brace in unlocked position however pt unable to achieve terminal knee extension while ambulating  -MD            User Key  (r) = Recorded By, (t) = Taken By, (c) = Cosigned By    Initials Name Provider Type    Thuy Hurst, PT Physical Therapist                         PT Assessment/Plan     Row Name 08/12/22 6264          PT Assessment    Assessment Comments Pt continues to be unable to achieve quad contraction seated and supine however in sidelying w LE weight supported/gravity eliminated position pt was able to demonstrate L knee extension.  While ambulating pt unable to achieve terminal knee extenxion keeping L knee flexed throughout ambulation.  Knee brace unlocked while ambulating pt showed no knee buckling.  Pt states compliance w use of wheelchair following spouse return to work.  -MD           User Key  (r) = Recorded By, (t) = Taken By, (c) = Cosigned By    Initials Name Provider Type     Thuy Hurst, PT Physical Therapist                    OP Exercises     Row Name 08/12/22 1318 08/12/22 1317          Subjective Comments    Subjective Comments Pt things are going well at home.  Pt able to move around home mod I in WC.  -MD --            Subjective Pain    Able to rate subjective pain? yes  -MD --     Pre-Treatment Pain Level 0  -MD --     Post-Treatment Pain Level 0  -MD --            Total Minutes    57171 - Gait Training Minutes  10  -MD --     31864 - PT Therapeutic Exercise Minutes 35  -MD --            Exercise 1    Exercise Name 1 -- heel slides  -MD     Cueing 1 -- Verbal;Tactile  -MD     Sets 1 -- 4  -MD     Reps 1 -- 5  -MD     Additional Comments -- pt able to obtain 70% hip flexion.  Board and sheet under pt's LE  -MD            Exercise 2    Exercise Name 2 -- sidelying knee extension  -MD     Cueing 2 -- Verbal;Tactile  -MD     Sets 2 -- 4  -MD     Reps 2 -- 5  -MD     Additional Comments -- AAROM to take weight off LE not to complete ROM  -MD            Exercise 3    Exercise Name 3 -- bridging  -MD     Cueing 3 -- Verbal;Tactile  -MD     Sets 3 -- 4  -MD     Reps 3 -- 5  -MD     Additional Comments -- feet closer together to allow greater WBing through L LE  -MD           User Key  (r) = Recorded By, (t) = Taken By, (c) = Cosigned By    Initials Name Provider Type    Thuy Hurst, PT Physical Therapist                                Therapy Education  Given: Mobility training  Program: Reinforced  How Provided: Verbal  Provided to: Patient  Level of Understanding: Verbalized, Teach back education performed              Time Calculation:   Start Time: 1300  Stop Time: 1345  Time Calculation (min): 45 min  Timed Charges  63429 - PT Therapeutic Exercise Minutes: 35  17025 - Gait Training Minutes : 10  Total Minutes  Timed Charges Total Minutes: 45   Total Minutes: 45   Therapy Charges for Today     Code Description Service Date Service Provider Modifiers Qty    [id number]  PT  THER PROC EA 15 MIN 8/12/2022 Thuy Shah, PT GP 2    [id number] HC GAIT TRAINING EA 15 MIN 8/12/2022 Thuy Shah, PT GP 1              Patient was wearing a face mask during this therapy encounter. Therapist used appropriate personal protective equipment including mask and gloves.  Mask used was standard procedure mask. Appropriate PPE was worn during the entire therapy session. Hand hygiene was completed before and after therapy session. Patient is not in enhanced droplet precautions.         Thuy Shah, PT  8/12/2022

## 2022-08-15 ENCOUNTER — APPOINTMENT (OUTPATIENT)
Dept: PHYSICAL THERAPY | Facility: HOSPITAL | Age: 87
End: 2022-08-15

## 2022-08-16 RX ORDER — SUB-Q INSULIN DEVICE, 40 UNIT
EACH MISCELLANEOUS
Qty: 30 EACH | Refills: 11 | OUTPATIENT
Start: 2022-08-16

## 2022-08-19 ENCOUNTER — HOSPITAL ENCOUNTER (OUTPATIENT)
Dept: PHYSICAL THERAPY | Facility: HOSPITAL | Age: 87
Setting detail: THERAPIES SERIES
Discharge: HOME | End: 2022-08-19

## 2022-08-19 ENCOUNTER — ANTICOAGULATION VISIT (OUTPATIENT)
Dept: PHARMACY | Facility: HOSPITAL | Age: 87
End: 2022-08-19

## 2022-08-19 DIAGNOSIS — I63.411 CEREBROVASCULAR ACCIDENT (CVA) DUE TO EMBOLISM OF RIGHT MIDDLE CEREBRAL ARTERY: ICD-10-CM

## 2022-08-19 DIAGNOSIS — M62.81 QUADRICEPS WEAKNESS: Primary | ICD-10-CM

## 2022-08-19 DIAGNOSIS — Z95.4 HISTORY OF AORTIC VALVE REPLACEMENT WITH METALLIC VALVE: Primary | ICD-10-CM

## 2022-08-19 DIAGNOSIS — R26.89 FUNCTIONAL GAIT ABNORMALITY: ICD-10-CM

## 2022-08-19 DIAGNOSIS — Z91.81 RISK FOR FALLS: ICD-10-CM

## 2022-08-19 DIAGNOSIS — S70.12XA HEMATOMA OF ILIOPSOAS MUSCLE, LEFT, INITIAL ENCOUNTER: ICD-10-CM

## 2022-08-19 LAB — INR PPP: 3.3

## 2022-08-19 PROCEDURE — 97116 GAIT TRAINING THERAPY: CPT

## 2022-08-19 PROCEDURE — 97110 THERAPEUTIC EXERCISES: CPT

## 2022-08-19 NOTE — PROGRESS NOTES
Anticoagulation Clinic Progress Note    Anticoagulation Summary  As of 8/19/2022    INR goal:  3.0-3.5   TTR:  67.8 % (3.6 y)   INR used for dosing:  3.30 (8/19/2022)   Warfarin maintenance plan:  5 mg every Thu; 7.5 mg all other days   Weekly warfarin total:  50 mg   No change documented:  Natty Sullivan   Plan last modified:  Michelle Piña RPH (7/8/2022)   Next INR check:  8/26/2022   Priority:  High   Target end date:  Indefinite    Indications    History of aortic valve replacement with metallic valve [Z95.4]  Atrial fibrillation (HCC) [I48.91]  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]             Anticoagulation Episode Summary     INR check location:      Preferred lab:      Send INR reminders to:  [facility]  POOL    Comments:  New INR goal 3 - 3.5 (see 1/2020 hospitalization for CVA)      Anticoagulation Care Providers     Provider Role Specialty Phone number    Griffin Fried MD Referring Cardiology [phone]          Clinic Interview:  No pertinent clinical findings have been reported.    INR History:  Anticoagulation Monitoring 8/5/2022 8/12/2022 8/19/2022   INR 3.50 3.30 3.30   INR Date 8/5/2022 8/12/2022 8/19/2022   INR Goal 3.0-3.5 3.0-3.5 3.0-3.5   Trend Same Same Same   Last Week Total 50 mg 50 mg 50 mg   Next Week Total 50 mg 50 mg 50 mg   Sun 7.5 mg 7.5 mg 7.5 mg   Mon 7.5 mg 7.5 mg 7.5 mg   Tue 7.5 mg 7.5 mg 7.5 mg   Wed 7.5 mg 7.5 mg 7.5 mg   Thu 5 mg 5 mg 5 mg   Fri 7.5 mg 7.5 mg 7.5 mg   Sat 7.5 mg 7.5 mg 7.5 mg   Visit Report - - -   Some recent data might be hidden       Plan:  1. INR is therapeutic today- see above in Anticoagulation Summary.    Francisco Sequeira to continue their warfarin regimen- see above in Anticoagulation Summary.  2. Follow up in 1 week  3. Pt has agreed to only be called if INR out of range. They have been instructed to call if any changes in medications, doses, concerns, etc.  Patient expresses understanding and has no further questions at this time.    Natty Sullivan

## 2022-08-19 NOTE — THERAPY TREATMENT NOTE
Outpatient Physical Therapy Neuro Treatment Note  [facility]     Patient Name: Francisco Sequeira  : 1935  MRN: [id number]  Today's Date: 2022      Visit Date: 2022    Visit Dx:    ICD-10-CM ICD-9-CM   1. Quadriceps weakness  M62.81 728.87   2. Functional gait abnormality  R26.89 781.2   3. Risk for falls  Z91.81 V15.88   4. Hematoma of iliopsoas muscle, left, initial encounter  S70.12XA 924.00       Patient Active Problem List   Diagnosis   • Atrial fibrillation (HCC)   • Hypertension   • Atopic rhinitis   • Gastroesophageal reflux disease   • Hyperlipidemia   • Type 2 diabetes mellitus (HCC)   • Low testosterone   • History of aortic valve replacement with metallic valve   • Kidney carcinoma (HCC)   • Stage 3b chronic kidney disease (HCC)   • BYRON (acute kidney injury) (HCC)   • 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)   • Iron deficiency anemia   • Chronic anticoagulation   • Hemiparesis of left nondominant side as late effect of cerebral infarction (HCC)   • Rectus sheath hematoma   • Hospital discharge follow-up   • Sepsis (HCC)   • Calculus of gallbladder with acute cholecystitis without obstruction   • History of Clostridium difficile infection   • Aspiration pneumonitis (HCC)   • Hematoma of iliopsoas muscle, left, initial encounter   • History of CVA (cerebrovascular accident)   • S/P laparoscopic cholecystectomy   • Anemia   • Hematoma of left iliopsoas muscle            PT Neuro     Row Name 22 1441             Subjective Comments    Subjective Comments Doing okay with spouse going to work  -LB              Precautions and Contraindications    Precautions/Limitations fall precautions  -LB      Precautions Significant quad weakness  -LB              Subjective Pain    Able to rate subjective pain? yes  -LB      Pre-Treatment Pain Level 0  -LB      Post-Treatment Pain Level 0  -LB              Cognition    Overall Cognitive Status WFL   -LB      Memory Decreased recall of recent events  -LB              Bed Mobility    Supine-Sit [place] (Bed Mobility) verbal cues;minimum assist (75% patient effort)  -LB      Sit-Supine [place] (Bed Mobility) verbal cues;minimum assist (75% patient effort)  -LB      Comment, (Bed Mobility) assist for left LE  -LB              Transfers    Sit-Stand [place] (Transfers) verbal cues;contact guard  -LB      Stand-Sit [place] (Transfers) verbal cues;contact guard  -LB      Transfers, Sit-Stand-Sit, Assist Device rolling walker  -LB      Comment, (Transfers) pt independently unlocked the brace when transfering to seated position  -LB              Gait/Stairs (Locomotion)    [place] Level (Gait) verbal cues;contact guard  -LB      Assistive Device (Gait) walker, front-wheeled  dynamic knee brace  -LB      Distance in Feet (Gait) 100 x 3; 75  -LB      Pattern (Gait) step-through  -LB      Deviations/Abnormal Patterns (Gait) bilateral deviations;base of support, narrow;gait speed decreased;stride length decreased  cues to prevent hip flexion when weight bearing on lleft LE  -LB      Bilateral Gait Deviations forward flexed posture;heel strike decreased;weight shift ability decreased  -LB      Left Sided Gait Deviations decreased knee extension;heel strike decreased  -LB      Right Sided Gait Deviations --  decreased step length  -LB      Gait Assessment/Intervention [facility] knee brace that assist with knee extension in swing phase  -LB            User Key  (r) = Recorded By, (t) = Taken By, (c) = Cosigned By    Initials Name Provider Type    LB Nalini Cano, PT Physical Therapist                         PT Assessment/Plan     Row Name 08/19/22 4400          PT Assessment    Assessment Comments Today unable to obtain any knee extension in sidelying position with powder board.  During ambulation the brace is assisting with knee extension in swing but not enough to achieve heelstrike.  Pt's memory  does at time affect his ability to remember if the brace was in locked or unlocked position.  Left hip flexion showing some improvement in strength.  -LB           User Key  (r) = Recorded By, (t) = Taken By, (c) = Cosigned By    Initials Name Provider Type    Nalini Grey PT Physical Therapist                    OP Exercises     Row Name 08/19/22 1525 08/19/22 1441          Subjective Comments    Subjective Comments -- Doing okay with spouse going to work  -LB            Subjective Pain    Able to rate subjective pain? -- yes  -LB     Pre-Treatment Pain Level -- 0  -LB     Post-Treatment Pain Level -- 0  -LB            Total Minutes    80155 - Gait Training Minutes  21  -LB --     54895 - PT Therapeutic Exercise Minutes 24  -LB --            Exercise 2    Exercise Name 2 -- sidelying knee extension  -LB     Cueing 2 -- Verbal;Tactile  -LB     Sets 2 -- 4  -LB     Reps 2 -- 5  -LB     Additional Comments -- Unable to feel any active knee extension  -LB            Exercise 3    Exercise Name 3 -- bridging  -LB     Sets 3 -- 2  -LB     Reps 3 -- 10  -LB            Exercise 7    Exercise Name 7 -- SKTC  -LB     Cueing 7 -- Verbal;Tactile  -LB     Sets 7 -- 2  -LB     Reps 7 -- 10  -LB     Additional Comments -- with assist  -LB            Exercise 8    Exercise Name 8 -- knee drop out  -LB     Cueing 8 -- Verbal;Tactile  -LB     Sets 8 -- 2  -LB     Reps 8 -- 10  -LB            Exercise 12    Exercise Name 12 -- sit to stand  -LB     Reps 12 -- 5  -LB     Additional Comments -- cueing for any knee contraction with coming to stand  -LB           User Key  (r) = Recorded By, (t) = Taken By, (c) = Cosigned By    Initials Name Provider Type    Nalini Grey PT Physical Therapist                                Therapy Education  Education Details: Continuous steps with ambulation  Given: Mobility training  Program: Reinforced  How Provided: Verbal  Provided to: Patient  Level of Understanding: Verbalized               Time Calculation:   Start Time: 1430  Stop Time: 1515  Time Calculation (min): 45 min  Timed Charges  91572 - PT Therapeutic Exercise Minutes: 24  10599 - Gait Training Minutes : 21  Total Minutes  Timed Charges Total Minutes: 45   Total Minutes: 45   Therapy Charges for Today     Code Description Service Date Service Provider Modifiers Qty    12520748450  PT THER PROC EA 15 MIN 8/19/2022 Nalini Cano, PT GP 2    [id number]  GAIT TRAINING EA 15 MIN 8/19/2022 Nalini Cano, PT GP 1        Patient was wearing a face mask during this therapy encounter. Therapist used appropriate personal protective equipment including mask and gloves.  Mask used was standard procedure mask. Appropriate PPE was worn during the entire therapy session. Hand hygiene was completed before and after therapy session. Patient is not in enhanced droplet precautions.                 Nalini Cano, CICI  8/19/2022

## 2022-08-22 ENCOUNTER — HOSPITAL ENCOUNTER (OUTPATIENT)
Dept: PHYSICAL THERAPY | Facility: HOSPITAL | Age: 87
Setting detail: THERAPIES SERIES
Discharge: HOME | End: 2022-08-22

## 2022-08-22 DIAGNOSIS — M62.81 QUADRICEPS WEAKNESS: Primary | ICD-10-CM

## 2022-08-22 DIAGNOSIS — S70.12XA HEMATOMA OF ILIOPSOAS MUSCLE, LEFT, INITIAL ENCOUNTER: ICD-10-CM

## 2022-08-22 DIAGNOSIS — Z91.81 RISK FOR FALLS: ICD-10-CM

## 2022-08-22 DIAGNOSIS — R26.89 FUNCTIONAL GAIT ABNORMALITY: ICD-10-CM

## 2022-08-22 PROCEDURE — 97110 THERAPEUTIC EXERCISES: CPT

## 2022-08-22 PROCEDURE — 97530 THERAPEUTIC ACTIVITIES: CPT

## 2022-08-22 NOTE — THERAPY TREATMENT NOTE
Outpatient Physical Therapy Neuro Treatment Note  [facility]     Patient Name: Francisco Sequeira  : 1935  MRN: [id number]  Today's Date: 2022      Visit Date: 2022    Visit Dx:    ICD-10-CM ICD-9-CM   1. Quadriceps weakness  M62.81 728.87   2. Functional gait abnormality  R26.89 781.2   3. Risk for falls  Z91.81 V15.88   4. Hematoma of iliopsoas muscle, left, initial encounter  S70.12XA 924.00       Patient Active Problem List   Diagnosis   • Atrial fibrillation (HCC)   • Hypertension   • Atopic rhinitis   • Gastroesophageal reflux disease   • Hyperlipidemia   • Type 2 diabetes mellitus (HCC)   • Low testosterone   • History of aortic valve replacement with metallic valve   • Kidney carcinoma (HCC)   • Stage 3b chronic kidney disease (HCC)   • BYRON (acute kidney injury) (HCC)   • 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)   • Iron deficiency anemia   • Chronic anticoagulation   • Hemiparesis of left nondominant side as late effect of cerebral infarction (HCC)   • Rectus sheath hematoma   • Hospital discharge follow-up   • Sepsis (HCC)   • Calculus of gallbladder with acute cholecystitis without obstruction   • History of Clostridium difficile infection   • Aspiration pneumonitis (HCC)   • Hematoma of iliopsoas muscle, left, initial encounter   • History of CVA (cerebrovascular accident)   • S/P laparoscopic cholecystectomy   • Anemia   • Hematoma of left iliopsoas muscle            PT Neuro     Row Name 22 1524             Subjective Comments    Subjective Comments Things are the same  -LB              Precautions and Contraindications    Precautions/Limitations fall precautions  -LB      Precautions Significant quad weakness  -LB      Contraindications Needs to wear dynamic knee brace on left  -LB              Subjective Pain    Able to rate subjective pain? yes  -LB      Pre-Treatment Pain Level 0  -LB      Post-Treatment Pain Level 0  -LB               Cognition    Overall Cognitive Status WFL  -LB              Posture/Observations    Posture/Observations Comments PT arrived to unit in transport wheelchair.  Spouse present.  Pt wearing knee brace.  Brace with only 1 band on each side  -LB              Bed Mobility    Supine-Sit [place] (Bed Mobility) verbal cues;minimum assist (75% patient effort)  -LB      Sit-Supine [place] (Bed Mobility) verbal cues;minimum assist (75% patient effort)  -LB      Comment, (Bed Mobility) assist for left LE only  -LB              Transfers    Sit-Stand [place] (Transfers) verbal cues;standby assist;contact guard  -LB      Stand-Sit [place] (Transfers) verbal cues;standby assist;contact guard  -LB      Transfers, Sit-Stand-Sit, Assist Device rolling walker  -LB      Comment, (Transfers) Pt is independently able to lock and unlock brace but does not always remember when he does  -LB              Gait/Stairs (Locomotion)    [place] Level (Gait) verbal cues;contact guard  -LB      Assistive Device (Gait) walker, front-wheeled  dynamic knee brace unlocked  -LB      Distance in Feet (Gait) 80 x 2; 100  -LB      Pattern (Gait) step-through  -LB      Deviations/Abnormal Patterns (Gait) bilateral deviations;base of support, narrow;gait speed decreased;stride length decreased  asymetrical step length  -LB      Bilateral Gait Deviations forward flexed posture;heel strike decreased;weight shift ability decreased  -LB      Left Sided Gait Deviations decreased knee extension;heel strike decreased  -LB      Right Sided Gait Deviations --  decreaed step length  -LB      Gait Assessment/Intervention [facility] knee brace that assist with knee extension in swing phase  -LB      Comment, (Gait/Stairs) Pt ambulated 100 ft with FWW, dynamic brace in unlocked position and patient did have buclking of knee times 1, pt able to recover  -LB              Balance Skills Training    Gait Balance-Level of Assistance Contact  guard;Minimum assistance  -LB      Gait Balance Support Left upper extremity supported;Right upper extremity supported;danny bar  -LB      Gait Balance Activities side-stepping  -LB      Gait Balance # of Minutes cues for hip extension  -LB            User Key  (r) = Recorded By, (t) = Taken By, (c) = Cosigned By    Initials Name Provider Type    Nalini Grey PT Physical Therapist                         PT Assessment/Plan     Row Name 08/22/22 1609          PT Assessment    Assessment Comments One time knee buckled during ambulation with brace in unlocked position, provided Min A for safety.  Concentrated on hip extension strength to improve stance stability of left LE.  No active knee extension noted in any position from seated, supine and sidelying.  -LB           User Key  (r) = Recorded By, (t) = Taken By, (c) = Cosigned By    Initials Name Provider Type    Nalini Grey PT Physical Therapist                    OP Exercises     Row Name 08/22/22 1612 08/22/22 1524          Subjective Comments    Subjective Comments -- Things are the same  -LB            Subjective Pain    Able to rate subjective pain? -- yes  -LB     Pre-Treatment Pain Level -- 0  -LB     Post-Treatment Pain Level -- 0  -LB            Total Minutes    18554 - PT Therapeutic Exercise Minutes 22  -LB --     69279 - PT Therapeutic Activity Minutes 22  -LB --            Exercise 3    Exercise Name 3 -- bridging  -LB     Sets 3 -- 2  -LB     Reps 3 -- 10  -LB            Exercise 7    Exercise Name 7 -- SKTC  -LB     Sets 7 -- 2  -LB     Reps 7 -- 10  -LB     Additional Comments -- with assist  -LB            Exercise 8    Exercise Name 8 -- knee drop out  -LB     Sets 8 -- 2  -LB     Reps 8 -- 10  -LB            Exercise 13    Exercise Name 13 -- DKTC on peanut ball  -LB     Sets 13 -- 2  -LB     Reps 13 -- 10  -LB           User Key  (r) = Recorded By, (t) = Taken By, (c) = Cosigned By    Initials Name Provider Type    Nalini Grey PT  Physical Therapist                                Therapy Education  Education Details: Hip extension in stance phase  Given: Mobility training  Program: Reinforced, Progressed  How Provided: Verbal, Demonstration  Provided to: Patient  Level of Understanding: Verbalized              Time Calculation:   Start Time: 1516  Stop Time: 1600  Time Calculation (min): 44 min  Timed Charges  33708 - PT Therapeutic Exercise Minutes: 22  20015 - PT Therapeutic Activity Minutes: 22  Total Minutes  Timed Charges Total Minutes: 44   Total Minutes: 44   Therapy Charges for Today     Code Description Service Date Service Provider Modifiers Qty    73378100393  PT THER PROC EA 15 MIN 8/22/2022 Nalini Cano PT GP 2    [id number]  PT THERAPEUTIC ACT EA 15 MIN 8/22/2022 Nalini Cano, PT GP 1              Patient was wearing a face mask during this therapy encounter. Therapist used appropriate personal protective equipment including mask and gloves.  Mask used was standard procedure mask. Appropriate PPE was worn during the entire therapy session. Hand hygiene was completed before and after therapy session. Patient is not in enhanced droplet precautions.           Nalini Cano PT  8/22/2022

## 2022-08-26 ENCOUNTER — ANTICOAGULATION VISIT (OUTPATIENT)
Dept: PHARMACY | Facility: HOSPITAL | Age: 87
End: 2022-08-26

## 2022-08-26 ENCOUNTER — HOSPITAL ENCOUNTER (OUTPATIENT)
Dept: PHYSICAL THERAPY | Facility: HOSPITAL | Age: 87
Setting detail: THERAPIES SERIES
Discharge: HOME | End: 2022-08-26

## 2022-08-26 ENCOUNTER — OFFICE VISIT (OUTPATIENT)
Dept: INTERNAL MEDICINE | Facility: CLINIC | Age: 87
End: 2022-08-26

## 2022-08-26 VITALS
HEIGHT: 72 IN | TEMPERATURE: 97.5 F | SYSTOLIC BLOOD PRESSURE: 130 MMHG | DIASTOLIC BLOOD PRESSURE: 66 MMHG | HEART RATE: 78 BPM | BODY MASS INDEX: 19.5 KG/M2 | WEIGHT: 144 LBS | OXYGEN SATURATION: 99 %

## 2022-08-26 DIAGNOSIS — Z79.4 TYPE 2 DIABETES MELLITUS WITH STAGE 3B CHRONIC KIDNEY DISEASE, WITH LONG-TERM CURRENT USE OF INSULIN: Primary | ICD-10-CM

## 2022-08-26 DIAGNOSIS — E11.22 TYPE 2 DIABETES MELLITUS WITH STAGE 3B CHRONIC KIDNEY DISEASE, WITH LONG-TERM CURRENT USE OF INSULIN: Primary | ICD-10-CM

## 2022-08-26 DIAGNOSIS — N18.32 TYPE 2 DIABETES MELLITUS WITH STAGE 3B CHRONIC KIDNEY DISEASE, WITH LONG-TERM CURRENT USE OF INSULIN: Primary | ICD-10-CM

## 2022-08-26 DIAGNOSIS — I48.21 PERMANENT ATRIAL FIBRILLATION: ICD-10-CM

## 2022-08-26 DIAGNOSIS — Z86.73 HISTORY OF CVA (CEREBROVASCULAR ACCIDENT): ICD-10-CM

## 2022-08-26 DIAGNOSIS — D50.9 IRON DEFICIENCY ANEMIA, UNSPECIFIED IRON DEFICIENCY ANEMIA TYPE: ICD-10-CM

## 2022-08-26 DIAGNOSIS — I63.411 CEREBROVASCULAR ACCIDENT (CVA) DUE TO EMBOLISM OF RIGHT MIDDLE CEREBRAL ARTERY: ICD-10-CM

## 2022-08-26 DIAGNOSIS — Z95.4 HISTORY OF AORTIC VALVE REPLACEMENT WITH METALLIC VALVE: Primary | ICD-10-CM

## 2022-08-26 DIAGNOSIS — E78.5 HYPERLIPIDEMIA, UNSPECIFIED HYPERLIPIDEMIA TYPE: ICD-10-CM

## 2022-08-26 LAB — INR PPP: 3.5

## 2022-08-26 PROCEDURE — 97110 THERAPEUTIC EXERCISES: CPT

## 2022-08-26 PROCEDURE — 99215 OFFICE O/P EST HI 40 MIN: CPT | Performed by: STUDENT IN AN ORGANIZED HEALTH CARE EDUCATION/TRAINING PROGRAM

## 2022-08-26 PROCEDURE — 97116 GAIT TRAINING THERAPY: CPT

## 2022-08-26 RX ORDER — FAMOTIDINE 20 MG/1
20 TABLET, FILM COATED ORAL DAILY
Qty: 90 TABLET | Refills: 1 | Status: SHIPPED | OUTPATIENT
Start: 2022-08-26 | End: 2023-02-20

## 2022-08-26 NOTE — THERAPY TREATMENT NOTE
Outpatient Physical Therapy Neuro Treatment Note  [facility]     Patient Name: Francisco Sequeira  : 1935  MRN: [id number]  Today's Date: 2022      Visit Date: 2022    Visit Dx:  No diagnosis found.    Patient Active Problem List   Diagnosis   • Atrial fibrillation (HCC)   • Hypertension   • Atopic rhinitis   • Gastroesophageal reflux disease   • Hyperlipidemia   • Type 2 diabetes mellitus (HCC)   • Low testosterone   • History of aortic valve replacement with metallic valve   • Kidney carcinoma (HCC)   • Stage 3b chronic kidney disease (HCC)   • BYRON (acute kidney injury) (HCC)   • 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)   • Iron deficiency anemia   • Chronic anticoagulation   • Hemiparesis of left nondominant side as late effect of cerebral infarction (HCC)   • Rectus sheath hematoma   • Hospital discharge follow-up   • Sepsis (HCC)   • Calculus of gallbladder with acute cholecystitis without obstruction   • History of Clostridium difficile infection   • Aspiration pneumonitis (HCC)   • Hematoma of iliopsoas muscle, left, initial encounter   • History of CVA (cerebrovascular accident)   • S/P laparoscopic cholecystectomy   • Anemia   • Hematoma of left iliopsoas muscle            PT Neuro     Row Name 22 1457             Subjective Comments    Subjective Comments Pt noted that he went to the doctor this am and he has gained 10 lbs.  -KP              Precautions and Contraindications    Precautions/Limitations fall precautions  -KP      Precautions Significant quad weakness  -KP      Contraindications Needs to wear dynamic knee brace on left  -KP              Subjective Pain    Able to rate subjective pain? yes  -KP      Pre-Treatment Pain Level 0  -KP      Post-Treatment Pain Level 0  -KP              Cognition    Overall Cognitive Status WFL  -KP      Arousal/Alertness Appropriate responses to stimuli  -KP      Memory Decreased recall of  recent events  -KP      Orientation Level Oriented to situation  -      Safety Judgment Good awareness of safety precautions  -KP      Deficits Fully aware of deficits  -KP              Bed Mobility    Supine-Sit [place] (Bed Mobility) minimum assist (75% patient effort);verbal cues  -KP      Sit-Supine [place] (Bed Mobility) minimum assist (75% patient effort);verbal cues  -KP      Comment, (Bed Mobility) assist for L LE  -KP              Transfers    Sit-Stand [place] (Transfers) contact guard;standby assist;verbal cues  -KP      Stand-Sit [place] (Transfers) standby assist;verbal cues  -KP      Transfers, Sit-Stand-Sit, Assist Device rolling walker  -KP      Comment, (Transfers) Pt [place] brace prior to gait.  -KP              Gait/Stairs (Locomotion)    [place] Level (Gait) contact guard;verbal cues  -KP      Assistive Device (Gait) walker, front-wheeled  unlocked dynamic knee brace  -      Distance in Feet (Gait) 160, 45, 120  -      Pattern (Gait) step-through  -      Deviations/Abnormal Patterns (Gait) bilateral deviations;base of support, narrow;gait speed decreased;stride length decreased  asymmetrical step length  -KP      Bilateral Gait Deviations forward flexed posture;heel strike decreased;weight shift ability decreased  -      Left Sided Gait Deviations decreased knee extension;heel strike decreased  occ dec clearance L forefoot.  -KP      Right Sided Gait Deviations --  dec step length  -      Gait Assessment/Intervention Dynamic knee brage with assit for knee ext.  Mild buckle L knee x 3 during session - pt corrected indep.  Vc to inc swing to allow for improved foot clearance.  -            User Key  (r) = Recorded By, (t) = Taken By, (c) = Cosigned By    Initials Name Provider Type     Karolina Michel, PT Physical Therapist                         PT Assessment/Plan     Row Name 08/26/22 1534          PT Assessment    Functional Limitations Decreased safety  during functional activities;Impaired gait;Impaired locomotion;Limitation in home management;Performance in self-care ADL  -     Impairments Balance;Endurance;Gait;Motor function;Muscle strength;Peripheral nerve integrity;Impaired postural alignment  -     Assessment Comments Pt with minor buckling episode x 3 in gait with brace unlocked but was able to self correct.  Pt with forefoot drag in swing 40% and could only correnct with VC and demo as he did not feel the foot drag.  -           User Key  (r) = Recorded By, (t) = Taken By, (c) = Cosigned By    Initials Name Provider Type    Karolina Piña, PT Physical Therapist                    OP Exercises     Row Name 08/26/22 1535 08/26/22 9010          Subjective Comments    Subjective Comments -- Pt noted that he went to the doctor this am and he has gained 10 lbs.  -            Subjective Pain    Able to rate subjective pain? -- yes  -     Pre-Treatment Pain Level -- 0  -KP     Post-Treatment Pain Level -- 0  -KP            Total Minutes    70521 - Gait Training Minutes  15  -KP --     98373 - PT Therapeutic Exercise Minutes 30  -KP --            Exercise 3    Exercise Name 3 -- bridging  -KP     Sets 3 -- 2  -KP     Reps 3 -- 10  -KP            Exercise 5    Exercise Name 5 -- bridges on peanut ball  -     Cueing 5 -- Verbal  -KP     Reps 5 -- 10  -KP            Exercise 8    Exercise Name 8 -- L knee drop out  -KP     Sets 8 -- 2  -KP     Reps 8 -- 10  -KP            Exercise 11    Exercise Name 11 -- step taps R to foam pad at danny bars.  -     Cueing 11 -- Demo  -KP     Reps 11 -- 10  -KP            Exercise 12    Exercise Name 12 -- sit to stand  -     Cueing 12 -- Verbal;Tactile  -KP     Reps 12 -- 5  -KP     Additional Comments -- from elevated high low with focus on LEs.  -KP            Exercise 13    Exercise Name 13 -- DKTC on peanut ball  -KP     Sets 13 -- 2  -KP     Reps 13 -- 10  -KP           User Key  (r) = Recorded By, (t) =  Taken By, (c) = Cosigned By    Initials Name Provider Type    Karolina Piña, PT Physical Therapist                                Therapy Education  Education Details: Focus on foot cleraance L              Time Calculation:   Start Time: 0233  Stop Time: 0318  Time Calculation (min): 45 min  Total Timed Code Minutes- PT: 45 minute(s)  Timed Charges  64406 - PT Therapeutic Exercise Minutes: 30  14922 - Gait Training Minutes : 15  Total Minutes  Timed Charges Total Minutes: 45   Total Minutes: 45   Therapy Charges for Today     Code Description Service Date Service Provider Modifiers Qty    24062565826 HC PT THER PROC EA 15 MIN 8/26/2022 Karolina Michel, PT GP 2    46146210711 HC GAIT TRAINING EA 15 MIN 8/26/2022 Karolina Michel, PT GP 1            Patient was intermittently wearing a face mask during this therapy encounter. Therapist used appropriate personal protective equipment including mask and gloves.  Mask used was standard procedure mask. Appropriate PPE was worn during the entire therapy session. Hand hygiene was completed before and after therapy session. Patient is not in enhanced droplet precautions.           Karolina Michel PT  8/26/2022

## 2022-08-26 NOTE — PROGRESS NOTES
Anticoagulation Clinic Progress Note    Anticoagulation Summary  As of 8/26/2022    INR goal:  3.0-3.5   TTR:  67.9 % (3.6 y)   INR used for dosing:  3.50 (8/26/2022)   Warfarin maintenance plan:  5 mg every Thu; 7.5 mg all other days   Weekly warfarin total:  50 mg   No change documented:  Ntaty Sullivan   Plan last modified:  Michelle Piña RPH (7/8/2022)   Next INR check:  9/2/2022   Priority:  High   Target end date:  Indefinite    Indications    History of aortic valve replacement with metallic valve [Z95.4]  Atrial fibrillation (HCC) [I48.91]  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]             Anticoagulation Episode Summary     INR check location:      Preferred lab:      Send INR reminders to:  [facility]  POOL    Comments:  New INR goal 3 - 3.5 (see 1/2020 hospitalization for CVA)      Anticoagulation Care Providers     Provider Role Specialty Phone number    Griffin Fried MD Referring Cardiology [phone]          Clinic Interview:  No pertinent clinical findings have been reported.    INR History:  Anticoagulation Monitoring 8/12/2022 8/19/2022 8/26/2022   INR 3.30 3.30 3.50   INR Date 8/12/2022 8/19/2022 8/26/2022   INR Goal 3.0-3.5 3.0-3.5 3.0-3.5   Trend Same Same Same   Last Week Total 50 mg 50 mg 50 mg   Next Week Total 50 mg 50 mg 50 mg   Sun 7.5 mg 7.5 mg 7.5 mg   Mon 7.5 mg 7.5 mg 7.5 mg   Tue 7.5 mg 7.5 mg 7.5 mg   Wed 7.5 mg 7.5 mg 7.5 mg   Thu 5 mg 5 mg 5 mg   Fri 7.5 mg 7.5 mg 7.5 mg   Sat 7.5 mg 7.5 mg 7.5 mg   Visit Report - - -   Some recent data might be hidden       Plan:  1. INR is therapeutic today- see above in Anticoagulation Summary.    Francisco Sequeira to continue their warfarin regimen- see above in Anticoagulation Summary.  2. Follow up in 1 week  3. Pt has agreed to only be called if INR out of range. They have been instructed to call if any changes in medications, doses, concerns, etc.  Patient expresses understanding and has no further questions at this time.    Natty Sullivan

## 2022-08-26 NOTE — PROGRESS NOTES
"  Celestine English D.O.  Internal Medicine  [facility]  [address], [address]  [place] [address]  [phone]      Chief Complaint  Establish Care and Diabetes    SUBJECTIVE    History of Present Illness    Francisco Sequeira is a 86 y.o. male who presents to the office today as a new patient to establish care.     Mechanical Aortic valve replacement 2004/Chronic atrial fibrillation/HTN : follows with [facility] and the anticoagulation clinic for warfarin management. Takes digoxin 62.5 mcg daily, metoprolol succinate 12.5 mg daily.     Type 2 Diabetes: has the VGO40 system, doesn't follow with endocrinology. They have been checking his sugar and it is 150s or below in the morning. It doesn't get below 60. Low 100s are usually where it is in the morning.  Doesn't take any extra \"clicks\" of insulin.     CVA/HLD: takes atorvastatin 40 mg daily    GERD: takes famotidine 20 mg daily    Iron deficiency anemia, GI Bleed: required hospitalization twice , first in 10/2021 and again in 11/2021. Takes ferrous gluconate every other day. No GI bleeding or black stools noted since.     CKD3b: follows with Dr Dominguez with nephrology    Kidney carcinoma s/p nephrectomy in 2006. Did not require chemotherapy or radiation.     Prostate cancer s/p prostate removal with urology in 2006. Follows with [facility] yearly and he reports everything is good there.     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      Allergies: benadryl and mucinex    Allergies   Allergen Reactions   • Penicillins Swelling   • Other Other (See Comments)     Grass, ragweed   • Percocet [Oxycodone-Acetaminophen] Nausea And Vomiting        Outpatient Medications Marked as Taking for the 8/26/22 encounter (Office Visit) with Celestine English DO "   Medication Sig Dispense Refill   • acetaminophen (TYLENOL) 325 MG tablet Take 2 tablets by mouth Every 6 (Six) Hours As Needed for Mild Pain .     • atorvastatin (LIPITOR) 40 MG tablet TAKE ONE TABLET BY MOUTH DAILY 90 tablet 1   • Digoxin 62.5 MCG tablet Take 62.5 mcg by mouth Daily. 30 tablet    • diphenhydrAMINE (BENADRYL) 25 mg capsule Take 1 capsule by mouth 2 (Two) Times a Day As Needed for Itching, Allergies or Sleep.     • famotidine (PEPCID) 20 MG tablet Take 1 tablet by mouth Daily. 90 tablet 1   • ferrous gluconate (FERGON) 324 MG tablet Take 1 tablet by mouth Every Other Day. 90 tablet 1   • Insulin Lispro (HumaLOG) 100 UNIT/ML injection Use as directed with v-go pump (Patient taking differently: Use as directed with v-go pump) 30 mL 6   • magnesium oxide (MAG-OX) 400 MG tablet Take 400 mg by mouth 2 (Two) Times a Day.     • metoprolol succinate XL (TOPROL-XL) 25 MG 24 hr tablet Take 0.5 tablets by mouth Daily for 90 days. 45 tablet 0   • [DISCONTINUED] famotidine (PEPCID) 20 MG tablet Take 1 tablet by mouth Daily. 90 tablet 0   • [DISCONTINUED] warfarin (COUMADIN) 5 MG tablet Once per day on Mon and Fri. INR goal 3.0-3.5.  Indications: Presence of Mechanical Artificial Heart Valve     • [DISCONTINUED] warfarin (COUMADIN) 7.5 MG tablet Once per day on Sun Tue Wed Thu Sat.  INR goal 3.0-3.5.  Indications: Presence of Mechanical Artificial Heart Valve          Past Medical History:   Diagnosis Date   • Allergic rhinitis    • Aortic valve insufficiency    • Ascending aortic aneurysm (HCC)    • Aspiration pneumonia (HCC)    • Atrial fibrillation (HCC)    • Bacteremia    • Calcific aortic stenosis of bicuspid valve    • Cardiac arrest (HCC)    • Cataracts, bilateral    • CKD (chronic kidney disease) stage 3, GFR 30-59 ml/min ([facility])    • Contact dermatitis due to poison ivy    • Elbow fracture    • GERD (gastroesophageal reflux disease)    • GI bleed     2021; s/p Colonoscopy and EGD   • H/O mechanical aortic  valve replacement    • Head injury    • History of transfusion    • Hyperlipidemia    • Hypertension    • Kidney carcinoma (HCC)    • 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: follows with [facility] for management   • Nonischemic cardiomyopathy (HCC)    • Prostate cancer (HCC)    • Renal oncocytoma    • Stroke (cerebrum) (HCC)    • Type 2 diabetes mellitus (HCC)    • Visual field defect      Past Surgical History:   Procedure Laterality Date   • AORTIC VALVE REPAIR/REPLACEMENT     • ASCENDING AORTIC ANEURYSM REPAIR W/ MECHANICAL AORTIC VALVE REPLACEMENT     • CHOLECYSTECTOMY WITH INTRAOPERATIVE CHOLANGIOGRAM N/A 03/29/2022    Procedure: Laparoscopic cholecystectomy with cholangiogram, possible open;  Surgeon: Lisa Guidry MD;  Location: [facility] MAIN OR;  Service: General;  Laterality: N/A;   • COLONOSCOPY N/A 10/28/2021    Procedure: COLONOSCOPY to cecum:  cold snare polyps,;  Surgeon: Dinesh Meza MD;  Location: [facility] ENDOSCOPY;  Service: Gastroenterology;  Laterality: N/A;  pre:  Iron deficiency anemia  post:  polyps, diverticulosis,    • COLONOSCOPY N/A 11/09/2021    Procedure: COLONOSCOPY to cecum with APC cautery of AVM and clip placement x1;  Surgeon: Pavan Rodriguez MD;  Location: [facility] ENDOSCOPY;  Service: Gastroenterology;  Laterality: N/A;  PRE - gi bleed, anemia  POST - diverticulosis, poor prep, AVM right colon   • ENDOSCOPY N/A 10/28/2021    Procedure: ESOPHAGOGASTRODUODENOSCOPY with biopsies;  Surgeon: Dinesh Meza MD;  Location: [facility] ENDOSCOPY;  Service: Gastroenterology;  Laterality: N/A;  pre:  Iron deficiency anemia  post:  duodenitis and gastritis   • EYE SURGERY  12/09/2020    cataract surgery    • NEPHRECTOMY     • OTHER SURGICAL HISTORY      elbow surgery   • OTHER SURGICAL HISTORY      right arm surgery   • PROSTATE SURGERY      prostate removal   • THORACENTESIS Left     diagnostic     Family History   Problem Relation  "Age of Onset   • Cancer Mother         colon   • Cancer Brother         colon    reports that he has quit smoking. His smoking use included cigarettes. He has a 25.00 pack-year smoking history. He has quit using smokeless tobacco. He reports previous alcohol use. He reports that he does not use drugs.    OBJECTIVE    Vital Signs:   /66   Pulse 78   Temp 97.5 °F (36.4 °C) (Infrared)   Ht 182.9 cm (72\")   Wt 65.3 kg (144 lb)   SpO2 99%   BMI 19.53 kg/m²     Physical Exam  Vitals reviewed.   Constitutional:       General: He is not in acute distress.     Appearance: He is normal weight. He is ill-appearing (chronically).   HENT:      Head: Atraumatic.   Eyes:      General: No scleral icterus.  Cardiovascular:      Rate and Rhythm: Normal rate and regular rhythm.      Comments: Mechanical valve click present  Pulmonary:      Effort: Pulmonary effort is normal. No respiratory distress.      Breath sounds: Normal breath sounds. No wheezing.   Musculoskeletal:      Comments: Ambulating via wheelchair. Left knee brace in place.    Skin:     Coloration: Skin is not jaundiced.   Neurological:      Mental Status: He is alert.   Psychiatric:         Mood and Affect: Mood normal.         Behavior: Behavior normal.         Thought Content: Thought content normal.                             ASSESSMENT & PLAN     Diagnoses and all orders for this visit:    1. Type 2 diabetes mellitus with stage 3b chronic kidney disease, with long-term current use of insulin ([facility]) (Primary)  -A1c trends on file for this patient:   A1C Last 3 Results    HGBA1C Last 3 Results 12/20/21 3/21/22 8/26/22   Hemoglobin A1C 5.4 6.7 9.0 (A)   (A) Abnormal value       Comments are available for some flowsheets but are not being displayed.           -Goal A1c for this patient is less than 7.5% due to extensive comorbid conditions  -Current diabetes regimen:  has the VGO40 system which delivers 40 Units of insulin (1.67 U/hr) in one 24-hour time " "period; he has not been doing any extra \"clicks\" to give on demand boluses; previously was on Tresiba as well   -Summary of patient's most recent blood glucose trends at home: Fasting  doesn't get below 60. Low 100s are usually where it is in the morning. He denies symptoms of hypoglycemia in many months.  -Changes made to diabetes regimen today: will recheck A1c; pt A1c was over-controlled when last checked in October 2021. He subsequently lost weight due to multiple hospital stays and had decreased oral intake. He is starting to gain weight back now. He is eating regular meals each day. If he were to not eat regular meals then a system like VGO would not be a good fit 2/2 risk of hypoglycemia. At this point if his A1c is below goal I will decrease him to VGO30 or VGO20 to decrease risk of hypoglycemia.   -Microalbuminuria screen: will update today  -     Hemoglobin A1c  -     Basic metabolic panel  -     Microalbumin / Creatinine Urine Ratio - Urine, Clean Catch    2. Iron deficiency anemia, unspecified iron deficiency anemia type  - required hospitalization twice , first in 10/2021 and again in 11/2021. Takes ferrous gluconate every other day. No GI bleeding or black stools noted since.   -per review of chart he had 2 colonoscopies and one EGD during these hospital stays with he most significant finding being a single colonic angiodysplastic lesion.  This was treated with argon plasma coagulation.  A clip was placed.   -he is maintained on ferrous sulfate every other day  Lab Results   Component Value Date    WBC 5.9 08/26/2022    HGB 12.2 (L) 08/26/2022    HCT 38.4 08/26/2022    MCV 78 (L) 08/26/2022     08/26/2022     -last CBC May 2022 showed improvement in Hgb to 10.3 compared to 7.2 at time of admission  -subsequently developed left iliopsoas hematoma  -no active bleeding reported; will repeat CBC and iron studies today  -     CBC w AUTO Differential  -     Iron Profile  -     Ferritin    3. Permanent " atrial fibrillation (HCC)  - follows with [facility] and the anticoagulation clinic for warfarin management. Takes digoxin 62.5 mcg daily, metoprolol succinate 12.5 mg daily.   -rate well controlled in office today at 78, BP overall good at 130/66    4. Hyperlipidemia, unspecified hyperlipidemia type  5. History of CVA (cerebrovascular accident)  Lab Results   Component Value Date    CHOL 80 10/24/2021    CHLPL 147 03/02/2021    TRIG 57 10/24/2021    HDL 38 (L) 10/24/2021    LDL 28 10/24/2021     -great control of LDL on atorvastatin 40 mg daily  -he is taking warfarin 2/2 atrial fibrillation and mechanical mitral valve replacement   -multiple GI bleeds , not on aspirin  -continue current statin      I spent 61 minutes caring for Francisco on this date of service. This time includes time spent by me in the following activities:preparing for the visit, reviewing tests, performing a medically appropriate examination and/or evaluation , counseling and educating the patient/family/caregiver, ordering medications, tests, or procedures and documenting information in the medical record    The following social determinates of health impact the patient's medical decision making: No social determinates of health were factored in to today's visit.     Follow Up  Return in about 4 weeks (around 9/23/2022) for Medicare Wellness.    Patient/family had no further questions at this time and verbalized understanding of the plan discussed today.

## 2022-08-27 LAB
BASOPHILS # BLD AUTO: 0 X10E3/UL (ref 0–0.2)
BASOPHILS NFR BLD AUTO: 1 %
BUN SERPL-MCNC: 36 MG/DL (ref 8–27)
BUN/CREAT SERPL: 22 (ref 10–24)
CALCIUM SERPL-MCNC: 9.1 MG/DL (ref 8.6–10.2)
CHLORIDE SERPL-SCNC: 104 MMOL/L (ref 96–106)
CO2 SERPL-SCNC: 20 MMOL/L (ref 20–29)
CREAT SERPL-MCNC: 1.62 MG/DL (ref 0.76–1.27)
EGFRCR-CYS SERPLBLD CKD-EPI 2021: 42 ML/MIN/1.73
EOSINOPHIL # BLD AUTO: 0.2 X10E3/UL (ref 0–0.4)
EOSINOPHIL NFR BLD AUTO: 3 %
ERYTHROCYTE [DISTWIDTH] IN BLOOD BY AUTOMATED COUNT: 17 % (ref 11.6–15.4)
FERRITIN SERPL-MCNC: 324 NG/ML (ref 30–400)
GLUCOSE SERPL-MCNC: 139 MG/DL (ref 65–99)
HBA1C MFR BLD: 9 % (ref 4.8–5.6)
HCT VFR BLD AUTO: 38.4 % (ref 37.5–51)
HGB BLD-MCNC: 12.2 G/DL (ref 13–17.7)
IMM GRANULOCYTES # BLD AUTO: 0 X10E3/UL (ref 0–0.1)
IMM GRANULOCYTES NFR BLD AUTO: 0 %
IRON SATN MFR SERPL: 17 % (ref 15–55)
IRON SERPL-MCNC: 51 UG/DL (ref 38–169)
LYMPHOCYTES # BLD AUTO: 1 X10E3/UL (ref 0.7–3.1)
LYMPHOCYTES NFR BLD AUTO: 17 %
MCH RBC QN AUTO: 24.9 PG (ref 26.6–33)
MCHC RBC AUTO-ENTMCNC: 31.8 G/DL (ref 31.5–35.7)
MCV RBC AUTO: 78 FL (ref 79–97)
MONOCYTES # BLD AUTO: 0.4 X10E3/UL (ref 0.1–0.9)
MONOCYTES NFR BLD AUTO: 7 %
NEUTROPHILS # BLD AUTO: 4.3 X10E3/UL (ref 1.4–7)
NEUTROPHILS NFR BLD AUTO: 72 %
PLATELET # BLD AUTO: 194 X10E3/UL (ref 150–450)
POTASSIUM SERPL-SCNC: 6.3 MMOL/L (ref 3.5–5.2)
RBC # BLD AUTO: 4.9 X10E6/UL (ref 4.14–5.8)
SODIUM SERPL-SCNC: 141 MMOL/L (ref 134–144)
TIBC SERPL-MCNC: 297 UG/DL (ref 250–450)
UIBC SERPL-MCNC: 246 UG/DL (ref 111–343)
WBC # BLD AUTO: 5.9 X10E3/UL (ref 3.4–10.8)

## 2022-08-29 ENCOUNTER — LAB (OUTPATIENT)
Dept: LAB | Facility: HOSPITAL | Age: 87
End: 2022-08-29

## 2022-08-29 ENCOUNTER — TELEPHONE (OUTPATIENT)
Dept: INTERNAL MEDICINE | Facility: CLINIC | Age: 87
End: 2022-08-29

## 2022-08-29 ENCOUNTER — HOSPITAL ENCOUNTER (OUTPATIENT)
Dept: PHYSICAL THERAPY | Facility: HOSPITAL | Age: 87
Setting detail: THERAPIES SERIES
Discharge: HOME | End: 2022-08-29

## 2022-08-29 DIAGNOSIS — R26.89 FUNCTIONAL GAIT ABNORMALITY: ICD-10-CM

## 2022-08-29 DIAGNOSIS — Z91.81 RISK FOR FALLS: ICD-10-CM

## 2022-08-29 DIAGNOSIS — N18.32 STAGE 3B CHRONIC KIDNEY DISEASE: Primary | ICD-10-CM

## 2022-08-29 DIAGNOSIS — N18.32 STAGE 3B CHRONIC KIDNEY DISEASE: ICD-10-CM

## 2022-08-29 DIAGNOSIS — M62.81 QUADRICEPS WEAKNESS: Primary | ICD-10-CM

## 2022-08-29 LAB
ANION GAP SERPL CALCULATED.3IONS-SCNC: 13.3 MMOL/L (ref 5–15)
BUN SERPL-MCNC: 35 MG/DL (ref 8–23)
BUN/CREAT SERPL: 25.9 (ref 7–25)
CALCIUM SPEC-SCNC: 9.3 MG/DL (ref 8.6–10.5)
CHLORIDE SERPL-SCNC: 101 MMOL/L (ref 98–107)
CO2 SERPL-SCNC: 22.7 MMOL/L (ref 22–29)
CREAT SERPL-MCNC: 1.35 MG/DL (ref 0.76–1.27)
EGFRCR SERPLBLD CKD-EPI 2021: 51.8 ML/MIN/1.73
GLUCOSE SERPL-MCNC: 76 MG/DL (ref 65–99)
POTASSIUM SERPL-SCNC: 5 MMOL/L (ref 3.5–5.2)
SODIUM SERPL-SCNC: 137 MMOL/L (ref 136–145)

## 2022-08-29 PROCEDURE — 97110 THERAPEUTIC EXERCISES: CPT

## 2022-08-29 PROCEDURE — 97116 GAIT TRAINING THERAPY: CPT

## 2022-08-29 PROCEDURE — 36415 COLL VENOUS BLD VENIPUNCTURE: CPT

## 2022-08-29 PROCEDURE — 80048 BASIC METABOLIC PNL TOTAL CA: CPT

## 2022-08-29 RX ORDER — WARFARIN SODIUM 5 MG/1
TABLET ORAL
Qty: 130 TABLET | Refills: 1 | Status: SHIPPED | OUTPATIENT
Start: 2022-08-29 | End: 2023-02-27 | Stop reason: SDUPTHER

## 2022-08-29 NOTE — THERAPY TREATMENT NOTE
Outpatient Physical Therapy Ortho Treatment Note  [facility]     Patient Name: Francisco Sequeira  : 1935  MRN: [id number]  Today's Date: 2022      Visit Date: 2022    Visit Dx:    ICD-10-CM ICD-9-CM   1. Quadriceps weakness  M62.81 728.87   2. Functional gait abnormality  R26.89 781.2   3. 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 ([facility])   • Iron deficiency anemia   • Chronic anticoagulation   • Hemiparesis of left nondominant side as late effect of cerebral infarction ([facility])   • Rectus sheath hematoma   • Hospital discharge follow-up   • Sepsis ([facility])   • Calculus of gallbladder with acute cholecystitis without obstruction   • History of Clostridium difficile infection   • Aspiration pneumonitis ([facility])   • Hematoma of iliopsoas muscle, left, initial encounter   • History of CVA (cerebrovascular accident)   • S/P laparoscopic cholecystectomy   • Anemia   • Hematoma of left iliopsoas muscle        Past Medical History:   Diagnosis Date   • Allergic rhinitis    • Aortic valve insufficiency    • Ascending aortic aneurysm ([facility])    • Aspiration pneumonia ([facility])    • Atrial fibrillation ([facility])    • Bacteremia    • Calcific aortic stenosis of bicuspid valve    • Cardiac arrest ([facility])    • Cataracts, bilateral    • CKD (chronic kidney disease) stage 3, GFR 30-59 ml/min ([facility])    • Contact dermatitis due to poison ivy    • Elbow fracture    • GERD (gastroesophageal reflux disease)    • GI bleed     ; s/p Colonoscopy and EGD   • H/O mechanical aortic  valve replacement    • Head injury    • History of transfusion    • Hyperlipidemia    • Hypertension    • Kidney carcinoma (HCC)    • 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: follows with [facility] for management   • Nonischemic cardiomyopathy (HCC)    • Prostate cancer (HCC)    • Renal oncocytoma    • Stroke (cerebrum) (HCC)    • Type 2 diabetes mellitus (HCC)    • Visual field defect         Past Surgical History:   Procedure Laterality Date   • AORTIC VALVE REPAIR/REPLACEMENT     • ASCENDING AORTIC ANEURYSM REPAIR W/ MECHANICAL AORTIC VALVE REPLACEMENT     • CHOLECYSTECTOMY WITH INTRAOPERATIVE CHOLANGIOGRAM N/A 03/29/2022    Procedure: Laparoscopic cholecystectomy with cholangiogram, possible open;  Surgeon: Lisa Guidry MD;  Location: [facility] MAIN OR;  Service: General;  Laterality: N/A;   • COLONOSCOPY N/A 10/28/2021    Procedure: COLONOSCOPY to cecum:  cold snare polyps,;  Surgeon: Dinesh Meza MD;  Location: [facility] ENDOSCOPY;  Service: Gastroenterology;  Laterality: N/A;  pre:  Iron deficiency anemia  post:  polyps, diverticulosis,    • COLONOSCOPY N/A 11/09/2021    Procedure: COLONOSCOPY to cecum with APC cautery of AVM and clip placement x1;  Surgeon: Pavan Rodriguez MD;  Location: [facility] ENDOSCOPY;  Service: Gastroenterology;  Laterality: N/A;  PRE - gi bleed, anemia  POST - diverticulosis, poor prep, AVM right colon   • ENDOSCOPY N/A 10/28/2021    Procedure: ESOPHAGOGASTRODUODENOSCOPY with biopsies;  Surgeon: Dinesh Meza MD;  Location: [facility] ENDOSCOPY;  Service: Gastroenterology;  Laterality: N/A;  pre:  Iron deficiency anemia  post:  duodenitis and gastritis   • EYE SURGERY  12/09/2020    cataract surgery    • NEPHRECTOMY     • OTHER SURGICAL HISTORY      elbow surgery   • OTHER SURGICAL HISTORY      right arm surgery   • PROSTATE SURGERY      prostate removal   • THORACENTESIS Left     diagnostic            PT Neuro     Row Name  08/29/22 7916             Subjective Comments    Subjective Comments Pt states that his wife does not want him walking at home  -DP              Precautions and Contraindications    Precautions/Limitations fall precautions  -DP      Precautions Significant quad weakness  -DP      Contraindications Needs to wear dynamic knee brace on left  -DP              Subjective Pain    Able to rate subjective pain? yes  -DP      Pre-Treatment Pain Level 0  -DP      Post-Treatment Pain Level 0  -DP              Cognition    Overall Cognitive Status WFL  -DP      Arousal/Alertness Appropriate responses to stimuli  -DP      Memory Decreased recall of recent events  -DP      Orientation Level Oriented to situation  -DP      Safety Judgment Good awareness of safety precautions  -DP      Deficits Fully aware of deficits  -DP              Posture/Observations    Posture/Observations Comments PT arrived to unit in transport wheelchair.  Spouse present.  Pt wearing knee brace.  Brace with only 1 band on each side  -DP              Bed Mobility    Bed Mobility scooting/bridging;supine-sit;sit-supine  -DP      Scooting/Bridging [place] (Bed Mobility) modified independence  -DP      Supine-Sit [place] (Bed Mobility) minimum assist (75% patient effort);verbal cues  -DP      Sit-Supine [place] (Bed Mobility) minimum assist (75% patient effort);verbal cues  -DP      Comment, (Bed Mobility) assist for LLE  -DP              Transfers    Transfers, Bed-Chair-Bed, Assist Device rolling walker  -DP      Sit-Stand [place] (Transfers) contact guard;standby assist;verbal cues  -DP      Stand-Sit [place] (Transfers) standby assist;verbal cues  -DP      Transfers, Sit-Stand-Sit, Assist Device rolling walker  -DP      Comment, (Transfers) Pt madina brace prior to gait.  -DP              Gait/Stairs (Locomotion)    [place] Level (Gait) contact guard;verbal cues  -DP      Assistive Device (Gait) walker, front-wheeled  -DP       Distance in Feet (Gait) 160'x1, 40'x2  -DP      Pattern (Gait) step-through  -DP      Deviations/Abnormal Patterns (Gait) bilateral deviations;base of support, narrow;gait speed decreased;stride length decreased  -DP      Bilateral Gait Deviations forward flexed posture;heel strike decreased;weight shift ability decreased  -DP      Left Sided Gait Deviations decreased knee extension;heel strike decreased  -DP      Gait Assessment/Intervention 1 instance of moderate L knee buckle pt used Emiil to maintain balance  -DP            User Key  (r) = Recorded By, (t) = Taken By, (c) = Cosigned By    Initials Name Provider Type    Vini Casillas PT Physical Therapist                         PT Assessment/Plan     Row Name 08/29/22 1613          PT Assessment    Functional Limitations Decreased safety during functional activities;Impaired gait;Impaired locomotion;Limitation in home management;Performance in self-care ADL  -DP     Impairments Balance;Endurance;Gait;Motor function;Muscle strength;Peripheral nerve integrity;Impaired postural alignment  -DP     Assessment Comments Pt had one moderate event of LLE buckling and required Emili to maintain balance. Pt performed all hip exercises well but did require maxA for quad isolated exercise but was able to feel quad activation when cuing pt to perform eccentric mode exercise.  -DP           User Key  (r) = Recorded By, (t) = Taken By, (c) = Cosigned By    Initials Name Provider Type    Vini Casillas PT Physical Therapist                   OP Exercises     Row Name 08/29/22 1605 08/29/22 1500          Subjective Comments    Subjective Comments Pt states that his wife does not want him walking at home  -DP --            Subjective Pain    Able to rate subjective pain? yes  -DP --     Pre-Treatment Pain Level 0  -DP --     Post-Treatment Pain Level 0  -DP --            Exercise 3    Exercise Name 3 -- bridging  -DP     Cueing 3 -- Verbal;Tactile  -DP     Sets 3 -- 2  -DP      Reps 3 -- 10  -DP            Exercise 4    Exercise Name 4 -- SAQs  -DP     Sets 4 -- 1  -DP     Reps 4 -- 10  -DP     Additional Comments -- maxA  -DP            Exercise 5    Exercise Name 5 -- bridges  -DP     Cueing 5 -- Verbal  -DP     Sets 5 -- 2  -DP     Reps 5 -- 10  -DP            Exercise 6    Exercise Name 6 -- seated knee flexion and extension  -DP     Sets 6 -- 1  -DP     Reps 6 -- 10  -DP     Additional Comments -- pt cued for eccentric control  -DP            Exercise 8    Exercise Name 8 -- L knee drop out  -DP     Cueing 8 -- Verbal;Tactile  -DP     Sets 8 -- 2  -DP     Reps 8 -- 10  -DP            Exercise 9    Exercise Name 9 -- SLR  -DP     Cueing 9 -- Verbal;Tactile  -DP     Sets 9 -- 1  -DP     Reps 9 -- 10  -DP     Additional Comments -- maxA  -DP            Exercise 13    Exercise Name 13 -- DKTC on peanut ball  -DP     Sets 13 -- 2  -DP     Reps 13 -- 10  -DP           User Key  (r) = Recorded By, (t) = Taken By, (c) = Cosigned By    Initials Name Provider Type    DP Vini Lombardo PT Physical Therapist                                 Therapy Education  Education Details: Pt educated on exercise progression with gravity assisted and gravity dependent  Given: Mobility training  Program: Reinforced, Progressed  How Provided: Verbal, Demonstration  Provided to: Patient  Level of Understanding: Verbalized              Time Calculation:   Start Time: 1515  Stop Time: 1600  Time Calculation (min): 45 min  Total Timed Code Minutes- PT: 45 minute(s)  Therapy Charges for Today     Code Description Service Date Service Provider Modifiers Qty    [id number]  GAIT TRAINING EA 15 MIN 8/29/2022 Vini Lombardo PT GP 1    [id number]  PT THER PROC EA 15 MIN 8/29/2022 Vini Lombardo PT GP 2            Patient was wearing a face mask during this therapy encounter. Therapist used appropriate personal protective equipment including mask and gloves.  Mask used was standard procedure mask. Appropriate  PPE was worn during the entire therapy session. Hand hygiene was completed before and after therapy session. Patient is not in enhanced droplet precautions.         Vini Lombardo, PT  8/29/2022

## 2022-08-29 NOTE — THERAPY TREATMENT NOTE
Outpatient Physical Therapy Ortho Treatment Note  [facility]     Patient Name: Francisco Sequeira  : 1935  MRN: [id number]  Today's Date: 2022      Visit Date: 2022    Visit Dx:    ICD-10-CM ICD-9-CM   1. Quadriceps weakness  M62.81 728.87   2. Functional gait abnormality  R26.89 781.2   3. Risk for falls  Z91.81 V15.88       Patient Active Problem List   Diagnosis   • Atrial fibrillation (HCC)   • Hypertension   • Atopic rhinitis   • Gastroesophageal reflux disease   • Hyperlipidemia   • Type 2 diabetes mellitus (HCC)   • Low testosterone   • History of aortic valve replacement with metallic valve   • Kidney carcinoma (HCC)   • Stage 3b chronic kidney disease (HCC)   • BYRON (acute kidney injury) ([facility])   • Cerebrovascular accident (CVA) due to embolism of right middle cerebral artery ([facility])   • Iron deficiency anemia   • Chronic anticoagulation   • Hemiparesis of left nondominant side as late effect of cerebral infarction ([facility])   • Rectus sheath hematoma   • Hospital discharge follow-up   • Sepsis ([facility])   • Calculus of gallbladder with acute cholecystitis without obstruction   • History of Clostridium difficile infection   • Aspiration pneumonitis ([facility])   • Hematoma of iliopsoas muscle, left, initial encounter   • History of CVA (cerebrovascular accident)   • S/P laparoscopic cholecystectomy   • Anemia   • Hematoma of left iliopsoas muscle        Past Medical History:   Diagnosis Date   • Allergic rhinitis    • Aortic valve insufficiency    • Ascending aortic aneurysm ([facility])    • Aspiration pneumonia ([facility])    • Atrial fibrillation ([facility])    • Bacteremia    • Calcific aortic stenosis of bicuspid valve    • Cardiac arrest ([facility])    • Cataracts, bilateral    • CKD (chronic kidney disease) stage 3, GFR 30-59 ml/min ([facility])    • Contact dermatitis due to poison ivy    • Elbow fracture    • GERD (gastroesophageal reflux disease)    • GI bleed     ; s/p Colonoscopy and EGD   • H/O mechanical aortic  valve replacement    • Head injury    • History of transfusion    • Hyperlipidemia    • Hypertension    • Kidney carcinoma (HCC)    • 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: follows with [facility] for management   • Nonischemic cardiomyopathy (HCC)    • Prostate cancer (HCC)    • Renal oncocytoma    • Stroke (cerebrum) (HCC)    • Type 2 diabetes mellitus (HCC)    • Visual field defect         Past Surgical History:   Procedure Laterality Date   • AORTIC VALVE REPAIR/REPLACEMENT     • ASCENDING AORTIC ANEURYSM REPAIR W/ MECHANICAL AORTIC VALVE REPLACEMENT     • CHOLECYSTECTOMY WITH INTRAOPERATIVE CHOLANGIOGRAM N/A 03/29/2022    Procedure: Laparoscopic cholecystectomy with cholangiogram, possible open;  Surgeon: Lisa Guidry MD;  Location: [facility] MAIN OR;  Service: General;  Laterality: N/A;   • COLONOSCOPY N/A 10/28/2021    Procedure: COLONOSCOPY to cecum:  cold snare polyps,;  Surgeon: Dinesh Meza MD;  Location: [facility] ENDOSCOPY;  Service: Gastroenterology;  Laterality: N/A;  pre:  Iron deficiency anemia  post:  polyps, diverticulosis,    • COLONOSCOPY N/A 11/09/2021    Procedure: COLONOSCOPY to cecum with APC cautery of AVM and clip placement x1;  Surgeon: Pavan Rodriguez MD;  Location: [facility] ENDOSCOPY;  Service: Gastroenterology;  Laterality: N/A;  PRE - gi bleed, anemia  POST - diverticulosis, poor prep, AVM right colon   • ENDOSCOPY N/A 10/28/2021    Procedure: ESOPHAGOGASTRODUODENOSCOPY with biopsies;  Surgeon: Dinesh Meza MD;  Location: [facility] ENDOSCOPY;  Service: Gastroenterology;  Laterality: N/A;  pre:  Iron deficiency anemia  post:  duodenitis and gastritis   • EYE SURGERY  12/09/2020    cataract surgery    • NEPHRECTOMY     • OTHER SURGICAL HISTORY      elbow surgery   • OTHER SURGICAL HISTORY      right arm surgery   • PROSTATE SURGERY      prostate removal   • THORACENTESIS Left     diagnostic            PT Neuro     Row Name  08/29/22 7132             Subjective Comments    Subjective Comments Pt states that his wife does not want him walking at home  -DP              Precautions and Contraindications    Precautions/Limitations fall precautions  -DP      Precautions Significant quad weakness  -DP      Contraindications Needs to wear dynamic knee brace on left  -DP              Subjective Pain    Able to rate subjective pain? yes  -DP      Pre-Treatment Pain Level 0  -DP      Post-Treatment Pain Level 0  -DP              Cognition    Overall Cognitive Status WFL  -DP      Arousal/Alertness Appropriate responses to stimuli  -DP      Memory Decreased recall of recent events  -DP      Orientation Level Oriented to situation  -DP      Safety Judgment Good awareness of safety precautions  -DP      Deficits Fully aware of deficits  -DP              Posture/Observations    Posture/Observations Comments PT arrived to unit in transport wheelchair.  Spouse present.  Pt wearing knee brace.  Brace with only 1 band on each side  -DP              Bed Mobility    Bed Mobility scooting/bridging;supine-sit;sit-supine  -DP      Scooting/Bridging [place] (Bed Mobility) modified independence  -DP      Supine-Sit [place] (Bed Mobility) minimum assist (75% patient effort);verbal cues  -DP      Sit-Supine [place] (Bed Mobility) minimum assist (75% patient effort);verbal cues  -DP      Comment, (Bed Mobility) assist for LLE  -DP              Transfers    Transfers, Bed-Chair-Bed, Assist Device rolling walker  -DP      Sit-Stand [place] (Transfers) contact guard;standby assist;verbal cues  -DP      Stand-Sit [place] (Transfers) standby assist;verbal cues  -DP      Transfers, Sit-Stand-Sit, Assist Device rolling walker  -DP      Comment, (Transfers) Pt [place] brace prior to gait.  -DP              Gait/Stairs (Locomotion)    [place] Level (Gait) contact guard;verbal cues  -DP      Assistive Device (Gait) walker, front-wheeled  -DP       Distance in Feet (Gait) 160'x1, 40'x2  -DP      Pattern (Gait) step-through  -DP      Deviations/Abnormal Patterns (Gait) bilateral deviations;base of support, narrow;gait speed decreased;stride length decreased  -DP      Bilateral Gait Deviations forward flexed posture;heel strike decreased;weight shift ability decreased  -DP      Left Sided Gait Deviations decreased knee extension;heel strike decreased  -DP      Gait Assessment/Intervention 1 instance of moderate L knee buckle pt used Emili to maintain balance  -DP            User Key  (r) = Recorded By, (t) = Taken By, (c) = Cosigned By    Initials Name Provider Type    Vini Casillas PT Physical Therapist                         PT Assessment/Plan     Row Name 08/29/22 1613          PT Assessment    Functional Limitations Decreased safety during functional activities;Impaired gait;Impaired locomotion;Limitation in home management;Performance in self-care ADL  -DP     Impairments Balance;Endurance;Gait;Motor function;Muscle strength;Peripheral nerve integrity;Impaired postural alignment  -DP     Assessment Comments Pt had one moderate event of LLE buckling and required Emili to maintain balance. Pt performed all hip exercises well but did require maxA for quad isolated exercise but was able to feel quad activation when cuing pt to perform eccentric mode exercise.  -DP           User Key  (r) = Recorded By, (t) = Taken By, (c) = Cosigned By    Initials Name Provider Type    Vini Casillas PT Physical Therapist                   OP Exercises     Row Name 08/29/22 1605 08/29/22 1500          Subjective Comments    Subjective Comments Pt states that his wife does not want him walking at home  -DP --            Subjective Pain    Able to rate subjective pain? yes  -DP --     Pre-Treatment Pain Level 0  -DP --     Post-Treatment Pain Level 0  -DP --            Total Minutes    27894 - Gait Training Minutes  15  -DP --     89728 - PT Therapeutic Exercise Minutes  30  -DP --            Exercise 3    Exercise Name 3 -- bridging  -DP     Cueing 3 -- Verbal;Tactile  -DP     Sets 3 -- 2  -DP     Reps 3 -- 10  -DP            Exercise 4    Exercise Name 4 -- SAQs  -DP     Sets 4 -- 1  -DP     Reps 4 -- 10  -DP     Additional Comments -- maxA  -DP            Exercise 5    Exercise Name 5 -- bridges  -DP     Cueing 5 -- Verbal  -DP     Sets 5 -- 2  -DP     Reps 5 -- 10  -DP            Exercise 6    Exercise Name 6 -- seated knee flexion and extension  -DP     Sets 6 -- 1  -DP     Reps 6 -- 10  -DP     Additional Comments -- pt cued for eccentric control  -DP            Exercise 8    Exercise Name 8 -- L knee drop out  -DP     Cueing 8 -- Verbal;Tactile  -DP     Sets 8 -- 2  -DP     Reps 8 -- 10  -DP            Exercise 9    Exercise Name 9 -- SLR  -DP     Cueing 9 -- Verbal;Tactile  -DP     Sets 9 -- 1  -DP     Reps 9 -- 10  -DP     Additional Comments -- maxA  -DP            Exercise 13    Exercise Name 13 -- DKTC on peanut ball  -DP     Sets 13 -- 2  -DP     Reps 13 -- 10  -DP           User Key  (r) = Recorded By, (t) = Taken By, (c) = Cosigned By    Initials Name Provider Type    DP Vini Lombardo PT Physical Therapist                                 Therapy Education  Education Details: Pt educated on exercise progression with gravity assisted and gravity dependent  Given: Mobility training  Program: Reinforced, Progressed  How Provided: Verbal, Demonstration  Provided to: Patient  Level of Understanding: Verbalized              Time Calculation:   Start Time: 1515  Stop Time: 1600  Time Calculation (min): 45 min  Total Timed Code Minutes- PT: 45 minute(s)  Timed Charges  63014 - PT Therapeutic Exercise Minutes: 30  06264 - Gait Training Minutes : 15  Total Minutes  Timed Charges Total Minutes: 45   Total Minutes: 45  Therapy Charges for Today     Code Description Service Date Service Provider Modifiers Qty    29058030475 HC GAIT TRAINING EA 15 MIN 8/29/2022 Vini Lombardo, PT  GP 1    [id number]  PT THER PROC EA 15 MIN 8/29/2022 Vini Lombardo, PT GP 2            Patient was wearing a face mask during this therapy encounter. Therapist used appropriate personal protective equipment including mask and gloves.  Mask used was standard procedure mask. Appropriate PPE was worn during the entire therapy session. Hand hygiene was completed before and after therapy session. Patient is not in enhanced droplet precautions.           Vini Lombardo, PT  8/29/2022

## 2022-08-29 NOTE — TELEPHONE ENCOUNTER
Received after hours call from Dr English regarding critical lab results  Elevated potassium of 6.3. Dr English recommend that he go to the ER for further evaluation. Pt notified   He also spoke with Dr English regarding the result  His wife called back a few times after hours - she states the patient does not want to go to the ER for further evaluation. He feels fine. He did not drink any water the last few days. Discussed the recommendations and risk of no further work up for elevated potassium and creatinine   She did call his nephrologist and they recommend that he go to the ER but patient does not want to go at this time  He wife states he will be a [facility] on Monday for PT and can recheck labs then  She will make sure he drink plenty of water over the weekend and will take him to the ER if any changes over the weekend

## 2022-09-02 ENCOUNTER — HOSPITAL ENCOUNTER (OUTPATIENT)
Dept: PHYSICAL THERAPY | Facility: HOSPITAL | Age: 87
Setting detail: THERAPIES SERIES
Discharge: HOME | End: 2022-09-02

## 2022-09-02 ENCOUNTER — ANTICOAGULATION VISIT (OUTPATIENT)
Dept: PHARMACY | Facility: HOSPITAL | Age: 87
End: 2022-09-02

## 2022-09-02 DIAGNOSIS — Z91.81 RISK FOR FALLS: ICD-10-CM

## 2022-09-02 DIAGNOSIS — M62.81 QUADRICEPS WEAKNESS: Primary | ICD-10-CM

## 2022-09-02 DIAGNOSIS — I63.411 CEREBROVASCULAR ACCIDENT (CVA) DUE TO EMBOLISM OF RIGHT MIDDLE CEREBRAL ARTERY: ICD-10-CM

## 2022-09-02 DIAGNOSIS — S70.12XA HEMATOMA OF ILIOPSOAS MUSCLE, LEFT, INITIAL ENCOUNTER: ICD-10-CM

## 2022-09-02 DIAGNOSIS — R26.89 FUNCTIONAL GAIT ABNORMALITY: ICD-10-CM

## 2022-09-02 DIAGNOSIS — Z95.4 HISTORY OF AORTIC VALVE REPLACEMENT WITH METALLIC VALVE: Primary | ICD-10-CM

## 2022-09-02 LAB — INR PPP: 3.5

## 2022-09-02 PROCEDURE — 97116 GAIT TRAINING THERAPY: CPT

## 2022-09-02 PROCEDURE — 97110 THERAPEUTIC EXERCISES: CPT

## 2022-09-02 NOTE — THERAPY PROGRESS REPORT/RE-CERT
Outpatient Physical Therapy Neuro Progress Note  [facility]     Patient Name: Francisco Sequeira  : 1935  MRN: [id number]  Today's Date: 2022      Visit Date: 2022    Visit Dx:    ICD-10-CM ICD-9-CM   1. Quadriceps weakness  M62.81 728.87   2. Functional gait abnormality  R26.89 781.2   3. Risk for falls  Z91.81 V15.88   4. Hematoma of iliopsoas muscle, left, initial encounter  S70.12XA 924.00       Patient Active Problem List   Diagnosis   • Atrial fibrillation (HCC)   • Hypertension   • Atopic rhinitis   • Gastroesophageal reflux disease   • Hyperlipidemia   • Type 2 diabetes mellitus (HCC)   • Low testosterone   • History of aortic valve replacement with metallic valve   • Kidney carcinoma (HCC)   • Stage 3b chronic kidney disease (HCC)   • BYRON (acute kidney injury) (HCC)   • 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)   • Iron deficiency anemia   • Chronic anticoagulation   • Hemiparesis of left nondominant side as late effect of cerebral infarction (HCC)   • Rectus sheath hematoma   • Hospital discharge follow-up   • Sepsis (HCC)   • Calculus of gallbladder with acute cholecystitis without obstruction   • History of Clostridium difficile infection   • Aspiration pneumonitis (HCC)   • Hematoma of iliopsoas muscle, left, initial encounter   • History of CVA (cerebrovascular accident)   • S/P laparoscopic cholecystectomy   • Anemia   • Hematoma of left iliopsoas muscle            PT Neuro     Row Name 22 1444             Subjective Comments    Subjective Comments I have been doing my exercises and walk with my wife each day  -LB              Precautions and Contraindications    Precautions/Limitations fall precautions  -LB      Precautions Significant quad weakness  -LB      Contraindications Needs to wear dynamic knee brace on left  -LB              Subjective Pain    Able to rate subjective pain? yes  -LB      Pre-Treatment Pain Level 0  -LB       Post-Treatment Pain Level 0  -LB              Cognition    Overall Cognitive Status WFL  -LB              Posture/Observations    Posture/Observations Comments PT arrived to unit in transport wheelchair.  Spouse present.  Pt wearing knee brace.  Brace with only 1 band on each side  -LB              Bed Mobility    Supine-Sit [place] (Bed Mobility) minimum assist (75% patient effort);verbal cues  -LB      Sit-Supine [place] (Bed Mobility) minimum assist (75% patient effort);verbal cues  -LB      Comment, (Bed Mobility) assist for left LE  -LB              Transfers    Transfers, Bed-Chair-Bed, Assist Device --  dynamic knee bracce  -LB      Sit-Stand [place] (Transfers) contact guard;standby assist;verbal cues  -LB      Stand-Sit [place] (Transfers) standby assist;verbal cues  -LB      Transfers, Sit-Stand-Sit, Assist Device rolling walker  -LB              Gait/Stairs (Locomotion)    [place] Level (Gait) contact guard;verbal cues  -LB      Assistive Device (Gait) walker, front-wheeled  [place] Trigger Lock Premier sheel orthosis with tension bands  -LB      Distance in Feet (Gait) 80 x 2; 100  -LB      Pattern (Gait) step-through  -LB      Deviations/Abnormal Patterns (Gait) bilateral deviations;base of support, narrow;gait speed decreased;stride length decreased  -LB      Bilateral Gait Deviations forward flexed posture;heel strike decreased;weight shift ability decreased  -LB      Left Sided Gait Deviations decreased knee extension;heel strike decreased  -LB      Gait Assessment/Intervention no knee buckling today  -LB              Balance Skills Training    Hip Strategy Assessment (Balance) Pt lifted left LE on 2 inch step and performed one step up with Rwx and CGA  -LB            User Key  (r) = Recorded By, (t) = Taken By, (c) = Cosigned By    Initials Name Provider Type    Nalini Grey PT Physical Therapist                         PT Assessment/Plan     Row Name 09/02/22 9244           PT Assessment    Assessment Comments The patient is continue to show some improvement in left hip strength, possible observed quad contraction in seated position.  Will need to speak with prosthetist to see if an additional band would help with heelstrike in ambulation and stability on left LE.  Long term goals still appropriate  -LB           User Key  (r) = Recorded By, (t) = Taken By, (c) = Cosigned By    Initials Name Provider Type    Nalini Grey PT Physical Therapist                    OP Exercises     Row Name 09/02/22 1519 09/02/22 1447          Subjective Comments    Subjective Comments -- I have been doing my exercises and walk with my wife each day  -LB            Subjective Pain    Able to rate subjective pain? -- yes  -LB     Pre-Treatment Pain Level -- 0  -LB     Post-Treatment Pain Level -- 0  -LB            Total Minutes    22056 - Gait Training Minutes  17  -LB --     02603 - PT Therapeutic Exercise Minutes 28  -LB --            Exercise 3    Exercise Name 3 -- bridging  -LB     Sets 3 -- 2  -LB     Reps 3 -- 10  -LB            Exercise 6    Exercise Name 6 -- seated knee flexion and extension  -LB     Reps 6 -- 10  -LB     Additional Comments -- some knee extension noted  -LB            Exercise 7    Exercise Name 7 -- SKTC  -LB     Sets 7 -- 2  -LB     Reps 7 -- 10  -LB     Additional Comments -- with assist  -LB            Exercise 8    Exercise Name 8 -- L knee drop out  -LB     Sets 8 -- 2  -LB     Reps 8 -- 10  -LB            Exercise 13    Exercise Name 13 -- DKTC on peanut ball  -LB     Sets 13 -- 2  -LB     Reps 13 -- 10  -LB           User Key  (r) = Recorded By, (t) = Taken By, (c) = Cosigned By    Initials Name Provider Type    Nalini Grey PT Physical Therapist                             PT OP Goals     Row Name 09/02/22 1500          PT Short Term Goals    Additional STG's STG 6;STG 7;STG 8  -LB     STG 6 Pt will come to stand from armless chair and Min  -LB     STG  7 Pt will ambulate 160 ft with FWW, knee brace in unlocked position and CGA  -LB     STG 7 Progress Not Met  -LB     STG 8 Pt will perform a TUG in 30 secs  -LB     STG 8 Progress Not Met  -LB            Time Calculation    PT Goal Re-Cert Due Date 10/03/22  -LB           User Key  (r) = Recorded By, (t) = Taken By, (c) = Cosigned By    Initials Name Provider Type    Nalini Grey PT Physical Therapist                               Time Calculation:   Start Time: 1430  Stop Time: 1515  Time Calculation (min): 45 min  Timed Charges  13611 - PT Therapeutic Exercise Minutes: 28  78269 - Gait Training Minutes : 17  Total Minutes  Timed Charges Total Minutes: 45   Total Minutes: 45   Therapy Charges for Today     Code Description Service Date Service Provider Modifiers Qty    78254944663  PT THER PROC EA 15 MIN 9/2/2022 Nalini Cano, PT GP 2    61827579680 HC GAIT TRAINING EA 15 MIN 9/2/2022 Nalini Cano, PT GP 1              Patient was wearing a face mask during this therapy encounter. Therapist used appropriate personal protective equipment including mask and gloves.  Mask used was standard procedure mask. Appropriate PPE was worn during the entire therapy session. Hand hygiene was completed before and after therapy session. Patient is not in enhanced droplet precautions.         Nalini Cano PT  9/2/2022

## 2022-09-02 NOTE — PROGRESS NOTES
Anticoagulation Clinic Progress Note    Anticoagulation Summary  As of 9/2/2022    INR goal:  3.0-3.5   TTR:  68.1 % (3.6 y)   INR used for dosing:  3.50 (9/2/2022)   Warfarin maintenance plan:  5 mg every Thu; 7.5 mg all other days   Weekly warfarin total:  50 mg   No change documented:  Shannon Flor, Pharmacy Technician   Plan last modified:  Michelle Piña RPH (7/8/2022)   Next INR check:  9/9/2022   Priority:  High   Target end date:  Indefinite    Indications    History of aortic valve replacement with metallic valve [Z95.4]  Atrial fibrillation (HCC) [I48.91]  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]             Anticoagulation Episode Summary     INR check location:      Preferred lab:      Send INR reminders to:  [facility]  POOL    Comments:  New INR goal 3 - 3.5 (see 1/2020 hospitalization for CVA)      Anticoagulation Care Providers     Provider Role Specialty Phone number    Griffin Fried MD Referring Cardiology [phone]          Clinic Interview:  No pertinent clinical findings have been reported.    INR History:  Anticoagulation Monitoring 8/19/2022 8/26/2022 9/2/2022   INR 3.30 3.50 3.50   INR Date 8/19/2022 8/26/2022 9/2/2022   INR Goal 3.0-3.5 3.0-3.5 3.0-3.5   Trend Same Same Same   Last Week Total 50 mg 50 mg 50 mg   Next Week Total 50 mg 50 mg 50 mg   Sun 7.5 mg 7.5 mg 7.5 mg   Mon 7.5 mg 7.5 mg 7.5 mg   Tue 7.5 mg 7.5 mg 7.5 mg   Wed 7.5 mg 7.5 mg 7.5 mg   Thu 5 mg 5 mg 5 mg   Fri 7.5 mg 7.5 mg 7.5 mg   Sat 7.5 mg 7.5 mg 7.5 mg   Visit Report - - -   Some recent data might be hidden       Plan:  1. INR is therapeutic today- see above in Anticoagulation Summary.    Francisco Sequeira to continue their warfarin regimen- see above in Anticoagulation Summary.  2. Follow up in 1 week  3. Pt has agreed to only be called if INR out of range. They have been instructed to call if any changes in medications, doses, concerns,  etc. Patient expresses understanding and has no further questions at this time.    Shannon Flor, Pharmacy Technician

## 2022-09-09 ENCOUNTER — HOSPITAL ENCOUNTER (OUTPATIENT)
Dept: PHYSICAL THERAPY | Facility: HOSPITAL | Age: 87
Setting detail: THERAPIES SERIES
Discharge: HOME | End: 2022-09-09

## 2022-09-09 ENCOUNTER — ANTICOAGULATION VISIT (OUTPATIENT)
Dept: PHARMACY | Facility: HOSPITAL | Age: 87
End: 2022-09-09

## 2022-09-09 DIAGNOSIS — R26.89 FUNCTIONAL GAIT ABNORMALITY: ICD-10-CM

## 2022-09-09 DIAGNOSIS — S70.12XA HEMATOMA OF ILIOPSOAS MUSCLE, LEFT, INITIAL ENCOUNTER: ICD-10-CM

## 2022-09-09 DIAGNOSIS — Z95.4 HISTORY OF AORTIC VALVE REPLACEMENT WITH METALLIC VALVE: Primary | ICD-10-CM

## 2022-09-09 DIAGNOSIS — Z91.81 RISK FOR FALLS: ICD-10-CM

## 2022-09-09 DIAGNOSIS — I63.411 CEREBROVASCULAR ACCIDENT (CVA) DUE TO EMBOLISM OF RIGHT MIDDLE CEREBRAL ARTERY: ICD-10-CM

## 2022-09-09 DIAGNOSIS — M62.81 QUADRICEPS WEAKNESS: Primary | ICD-10-CM

## 2022-09-09 LAB — INR PPP: 3.3

## 2022-09-09 PROCEDURE — 97116 GAIT TRAINING THERAPY: CPT

## 2022-09-09 PROCEDURE — 97110 THERAPEUTIC EXERCISES: CPT

## 2022-09-09 NOTE — THERAPY TREATMENT NOTE
Outpatient Physical Therapy Neuro Treatment Note  [facility]     Patient Name: Francisco Sequeira  : 1935  MRN: [id number]  Today's Date: 2022      Visit Date: 2022    Visit Dx:    ICD-10-CM ICD-9-CM   1. Quadriceps weakness  M62.81 728.87   2. Functional gait abnormality  R26.89 781.2   3. Risk for falls  Z91.81 V15.88   4. Hematoma of iliopsoas muscle, left, initial encounter  S70.12XA 924.00       Patient Active Problem List   Diagnosis   • Atrial fibrillation (HCC)   • Hypertension   • Atopic rhinitis   • Gastroesophageal reflux disease   • Hyperlipidemia   • Type 2 diabetes mellitus (HCC)   • Low testosterone   • History of aortic valve replacement with metallic valve   • Kidney carcinoma (HCC)   • Stage 3b chronic kidney disease (HCC)   • BYRON (acute kidney injury) (HCC)   • 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)   • Iron deficiency anemia   • Chronic anticoagulation   • Hemiparesis of left nondominant side as late effect of cerebral infarction (HCC)   • Rectus sheath hematoma   • Hospital discharge follow-up   • Sepsis (HCC)   • Calculus of gallbladder with acute cholecystitis without obstruction   • History of Clostridium difficile infection   • Aspiration pneumonitis (HCC)   • Hematoma of iliopsoas muscle, left, initial encounter   • History of CVA (cerebrovascular accident)   • S/P laparoscopic cholecystectomy   • Anemia   • Hematoma of left iliopsoas muscle            PT Neuro     Row Name 22 1441             Subjective Comments    Subjective Comments I new hearing aids next week  -LB              Precautions and Contraindications    Precautions/Limitations fall precautions  -LB      Precautions Significant quad weakness  -LB              Subjective Pain    Able to rate subjective pain? yes  -LB      Pre-Treatment Pain Level 0  -LB      Post-Treatment Pain Level 0  -LB              Cognition    Overall Cognitive Status WFL  -LB               Posture/Observations    Posture/Observations Comments PT arrived to unit in transport wheelchair.  Spouse present.  Pt wearing knee brace.  Brace with only 1 band on each side  -LB              Bed Mobility    Supine-Sit [place] (Bed Mobility) minimum assist (75% patient effort);verbal cues  -LB      Sit-Supine [place] (Bed Mobility) minimum assist (75% patient effort);verbal cues  -LB      Comment, (Bed Mobility) assist for left LE  -LB              Transfers    Sit-Stand [place] (Transfers) contact guard;standby assist;verbal cues  -LB      Stand-Sit [place] (Transfers) standby assist;verbal cues  -LB      Transfers, Sit-Stand-Sit, Assist Device rolling walker  -LB              Gait/Stairs (Locomotion)    [place] Level (Gait) contact guard;verbal cues  -LB      Assistive Device (Gait) walker, front-wheeled  [place] Trigger Lock Premier sheel orthosis with tension bands in unlocked position  -LB      Distance in Feet (Gait) 50; 75; 80  -LB      Pattern (Gait) step-through  -LB      Deviations/Abnormal Patterns (Gait) bilateral deviations;base of support, narrow;gait speed decreased;stride length decreased  -LB      Bilateral Gait Deviations forward flexed posture;heel strike decreased;weight shift ability decreased  -LB      Left Sided Gait Deviations decreased knee extension;heel strike decreased  -LB              Curb Negotiation (Mobility)    [place], Curb Negotiation verbal cues;contact guard  -LB      Assistive Device (Curb Negotiation) walker, front-wheeled  knee brace in locked position  -LB      Comment, Curb Negotiation (Mobility) pt did vc for correct LE to descend the step  -LB            User Key  (r) = Recorded By, (t) = Taken By, (c) = Cosigned By    Initials Name Provider Type    Nalini Grey PT Physical Therapist                         PT Assessment/Plan     Row Name 09/09/22 6339          PT Assessment    Assessment Comments The patient did well on  curb with the brace in locked position but did need vc for sequence when descending.  Pt did well standing up from armless chair, only needing CGA to complete the transfer.  Did not observe any quad contraction today in gravity lessened position.  Pt questioned if it was better to walk without the brace, advised that his knee could easily buckle due to quad weakness and that it was not advised.  Needed to keep using brae  -LB           User Key  (r) = Recorded By, (t) = Taken By, (c) = Cosigned By    Initials Name Provider Type    LB Nalini Cano, PT Physical Therapist                    OP Exercises     Row Name 09/09/22 1617 09/09/22 1500 09/09/22 1441       Subjective Comments    Subjective Comments -- -- I new hearing aids next week  -LB       Subjective Pain    Able to rate subjective pain? -- -- yes  -LB    Pre-Treatment Pain Level -- -- 0  -LB    Post-Treatment Pain Level -- -- 0  -LB       Total Minutes    08871 - Gait Training Minutes  20  -LB -- --    03635 - PT Therapeutic Exercise Minutes 25  -LB -- --       Exercise 2    Exercise Name 2 -- sidelying knee extension; hip flexion/extension  -LB --    Sets 2 -- 2  -LB --    Reps 2 -- 10  -LB --    Additional Comments -- max A for knee extension, not able to feel any contraction  -LB --       Exercise 3    Exercise Name 3 -- -- bridging  -LB    Sets 3 -- -- 1  -LB    Reps 3 -- -- 10  -LB       Exercise 7    Exercise Name 7 -- -- SKTC  -LB    Sets 7 -- -- 2  -LB    Reps 7 -- -- 10  -LB    Additional Comments -- -- with Assist  -LB       Exercise 8    Exercise Name 8 -- -- L knee drop out  -LB    Sets 8 -- -- 2  -LB    Reps 8 -- -- 10  -LB       Exercise 9    Exercise Name 9 -- -- SLR  -LB    Sets 9 -- -- 1  -LB    Reps 9 -- -- 10  -LB    Additional Comments -- -- Max A  -LB       Exercise 13    Exercise Name 13 -- -- DKTC on peanut ball  -LB    Sets 13 -- -- 2  -LB    Reps 13 -- -- 10  -LB          User Key  (r) = Recorded By, (t) = Taken By, (c) = Cosigned  By    Initials Name Provider Type    Nalini Grey PT Physical Therapist                                               Time Calculation:   Start Time: 1430  Stop Time: 1515  Time Calculation (min): 45 min  Timed Charges  06765 - PT Therapeutic Exercise Minutes: 25  10069 - Gait Training Minutes : 20  Total Minutes  Timed Charges Total Minutes: 45   Total Minutes: 45   Therapy Charges for Today     Code Description Service Date Service Provider Modifiers Qty    50929922250 HC PT THER PROC EA 15 MIN 9/9/2022 Nalini Cano, PT GP 2    80480214801 HC GAIT TRAINING EA 15 MIN 9/9/2022 Nalini Cano, PT GP 1              Patient was wearing a face mask during this therapy encounter. Therapist used appropriate personal protective equipment including mask and gloves.  Mask used was standard procedure mask. Appropriate PPE was worn during the entire therapy session. Hand hygiene was completed before and after therapy session. Patient is not in enhanced droplet precautions.           Nalini Cano PT  9/9/2022

## 2022-09-09 NOTE — PROGRESS NOTES
Anticoagulation Clinic Progress Note    Anticoagulation Summary  As of 9/9/2022    INR goal:  3.0-3.5   TTR:  68.3 % (3.7 y)   INR used for dosing:  3.30 (9/9/2022)   Warfarin maintenance plan:  5 mg every Thu; 7.5 mg all other days   Weekly warfarin total:  50 mg   No change documented:  Shannon Flor, Pharmacy Technician   Plan last modified:  Michelle Piña RPH (7/8/2022)   Next INR check:  9/16/2022   Priority:  High   Target end date:  Indefinite    Indications    History of aortic valve replacement with metallic valve [Z95.4]  Atrial fibrillation (HCC) [I48.91]  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]             Anticoagulation Episode Summary     INR check location:      Preferred lab:      Send INR reminders to:  [facility]  POOL    Comments:  New INR goal 3 - 3.5 (see 1/2020 hospitalization for CVA)      Anticoagulation Care Providers     Provider Role Specialty Phone number    Griffin Fried MD Referring Cardiology [phone]          Clinic Interview:  No pertinent clinical findings have been reported.    INR History:  Anticoagulation Monitoring 8/26/2022 9/2/2022 9/9/2022   INR 3.50 3.50 3.30   INR Date 8/26/2022 9/2/2022 9/9/2022   INR Goal 3.0-3.5 3.0-3.5 3.0-3.5   Trend Same Same Same   Last Week Total 50 mg 50 mg 50 mg   Next Week Total 50 mg 50 mg 50 mg   Sun 7.5 mg 7.5 mg 7.5 mg   Mon 7.5 mg 7.5 mg 7.5 mg   Tue 7.5 mg 7.5 mg 7.5 mg   Wed 7.5 mg 7.5 mg 7.5 mg   Thu 5 mg 5 mg 5 mg   Fri 7.5 mg 7.5 mg 7.5 mg   Sat 7.5 mg 7.5 mg 7.5 mg   Visit Report - - -   Some recent data might be hidden       Plan:  1. INR is therapeutic today- see above in Anticoagulation Summary.    Francisco Sequeira to continue their warfarin regimen- see above in Anticoagulation Summary.  2. Follow up in 1 week  3. Pt has agreed to only be called if INR out of range. They have been instructed to call if any changes in medications, doses, concerns,  etc. Patient expresses understanding and has no further questions at this time.    Shannon Flor, Pharmacy Technician

## 2022-09-12 ENCOUNTER — HOSPITAL ENCOUNTER (OUTPATIENT)
Dept: PHYSICAL THERAPY | Facility: HOSPITAL | Age: 87
Setting detail: THERAPIES SERIES
Discharge: HOME | End: 2022-09-12

## 2022-09-12 DIAGNOSIS — Z91.81 RISK FOR FALLS: ICD-10-CM

## 2022-09-12 DIAGNOSIS — M62.81 QUADRICEPS WEAKNESS: Primary | ICD-10-CM

## 2022-09-12 DIAGNOSIS — R26.89 FUNCTIONAL GAIT ABNORMALITY: ICD-10-CM

## 2022-09-12 PROCEDURE — 97110 THERAPEUTIC EXERCISES: CPT | Performed by: PHYSICAL THERAPIST

## 2022-09-12 PROCEDURE — 97112 NEUROMUSCULAR REEDUCATION: CPT | Performed by: PHYSICAL THERAPIST

## 2022-09-12 NOTE — THERAPY TREATMENT NOTE
Outpatient Physical Therapy Neuro Treatment Note  [facility]     Patient Name: Francisco Sequeira  : 1935  MRN: [id number]  Today's Date: 2022      Visit Date: 2022    Visit Dx:    ICD-10-CM ICD-9-CM   1. Quadriceps weakness  M62.81 728.87   2. Functional gait abnormality  R26.89 781.2   3. Risk for falls  Z91.81 V15.88       Patient Active Problem List   Diagnosis   • Atrial fibrillation (HCC)   • Hypertension   • Atopic rhinitis   • Gastroesophageal reflux disease   • Hyperlipidemia   • Type 2 diabetes mellitus (HCC)   • Low testosterone   • History of aortic valve replacement with metallic valve   • Kidney carcinoma (HCC)   • Stage 3b chronic kidney disease (HCC)   • BYRON (acute kidney injury) (HCC)   • 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)   • Iron deficiency anemia   • Chronic anticoagulation   • Hemiparesis of left nondominant side as late effect of cerebral infarction (HCC)   • Rectus sheath hematoma   • Hospital discharge follow-up   • Sepsis (HCC)   • Calculus of gallbladder with acute cholecystitis without obstruction   • History of Clostridium difficile infection   • Aspiration pneumonitis (HCC)   • Hematoma of iliopsoas muscle, left, initial encounter   • History of CVA (cerebrovascular accident)   • S/P laparoscopic cholecystectomy   • Anemia   • Hematoma of left iliopsoas muscle            PT Neuro     Row Name 22 8800             Subjective Comments    Subjective Comments pt reports no new problems today  -JK              Precautions and Contraindications    Precautions/Limitations fall precautions  -JK      Precautions Significant quad weakness  -JK              Subjective Pain    Able to rate subjective pain? yes  -JK      Pre-Treatment Pain Level 0  -JK      Post-Treatment Pain Level 0  -JK              Cognition    Overall Cognitive Status WFL  -JK              Posture/Observations    Posture/Observations Comments PT  arrived to unit in transport wheelchair.  Spouse present.  Pt wearing knee brace.  Brace with only 1 band on each side  -JK              Transfers    Sit-Stand [place] (Transfers) contact guard;standby assist;verbal cues  -JK      Stand-Sit [place] (Transfers) standby assist;verbal cues  -JK      Transfers, Sit-Stand-Sit, Assist Device rolling walker  -JK      Comment, (Transfers) brace unlocked  -JK              Gait/Stairs (Locomotion)    [place] Level (Gait) contact guard;verbal cues  -JK      Assistive Device (Gait) walker, front-wheeled  [place] Trigger Lock Premier sheel orthosis with tension bands in unlocked position  -JK      Distance in Feet (Gait) 160  -JK      Pattern (Gait) step-through  -JK      Deviations/Abnormal Patterns (Gait) bilateral deviations;base of support, narrow;gait speed decreased;stride length decreased  -JK      Bilateral Gait Deviations forward flexed posture;heel strike decreased;weight shift ability decreased  -JK      Left Sided Gait Deviations decreased knee extension;heel strike decreased  -JK              Balance Skills Training    Standing-Level of Assistance Contact guard  -JK      Static Standing Balance Support parallel bars  -JK      Standing-Balance Activities Weight Shift R-L;Weight Shift A-P  -JK            User Key  (r) = Recorded By, (t) = Taken By, (c) = Cosigned By    Initials Name Provider Type    Janie Baker, PT Physical Therapist                         PT Assessment/Plan     Row Name 09/12/22 1556          PT Assessment    Functional Limitations Decreased safety during functional activities;Impaired gait;Impaired locomotion;Limitation in home management;Performance in self-care ADL  -JK     Impairments Balance;Endurance;Gait;Motor function;Muscle strength;Peripheral nerve integrity;Impaired postural alignment  -JK     Assessment Comments Pt was able to tolerate ambulating 160' with rolling wx today with knee unlocked. Pt worked on terminal  "knee extension in standing and on sit to stand with UEs pushing on arm rests of  chair and equal weight on LEs; both were challenging for patient.  -JK           User Key  (r) = Recorded By, (t) = Taken By, (c) = Cosigned By    Initials Name Provider Type    Janie Baker PT Physical Therapist                    OP Exercises     Row Name 09/12/22 1558 09/12/22 1397          Subjective Comments    Subjective Comments -- pt reports no new problems today  -JK            Subjective Pain    Able to rate subjective pain? -- yes  -JK     Pre-Treatment Pain Level -- 0  -JK     Post-Treatment Pain Level -- 0  -JK            Total Minutes    97393 - PT Therapeutic Exercise Minutes 10  -JK --     80376 -  PT Neuromuscular Reeducation Minutes 50  -JK --            Exercise 10    Exercise Name 10 -- left foot placement on 4 inch step  -JK     Cueing 10 -- Verbal;Tactile  -JK     Sets 10 -- 1  -JK     Reps 10 -- 10  -JK     Additional Comments -- cues for posture  -JK            Exercise 11    Exercise Name 11 -- step taps R to 4\" step at//  bars.  -JK     Cueing 11 -- Verbal;Tactile  -JK     Sets 11 -- 1  -JK     Reps 11 -- 10  -JK     Additional Comments -- L knee locked; cues for posture  -JK            Exercise 12    Exercise Name 12 -- sit to stand  -JK     Cueing 12 -- Verbal;Tactile  -JK     Reps 12 -- 5  -JK     Additional Comments -- from transport chair pushing on armrests  -JK           User Key  (r) = Recorded By, (t) = Taken By, (c) = Cosigned By    Initials Name Provider Type    Janie Baker PT Physical Therapist                                Therapy Education  Education Details: Pt encouraged to stand with more upright posture and more equal weight distribution through LEs.  Given: Mobility training, Posture/body mechanics, Symptoms/condition management  Program: Reinforced  How Provided: Verbal, Demonstration  Provided to: Patient  Level of Understanding: Verbalized              Time " Calculation:   Start Time: 1450  Stop Time: 1550  Time Calculation (min): 60 min  Timed Charges  07417 - PT Therapeutic Exercise Minutes: 10  79922 -  PT Neuromuscular Reeducation Minutes: 50  Total Minutes  Timed Charges Total Minutes: 60   Total Minutes: 60   Therapy Charges for Today     Code Description Service Date Service Provider Modifiers Qty    85104963211 HC PT NEUROMUSC RE EDUCATION EA 15 MIN 9/12/2022 Janie Ash, PT GP 3    07206808081 HC PT THER PROC EA 15 MIN 9/12/2022 Janie Ash, PT GP 1              Patient was wearing a face mask during this therapy encounter. Therapist used appropriate personal protective equipment including mask and gloves.  Mask used was standard procedure mask. Appropriate PPE was worn during the entire therapy session. Hand hygiene was completed before and after therapy session. Patient is not in enhanced droplet precautions.         Janie Ash, PT  9/12/2022

## 2022-09-16 ENCOUNTER — ANTICOAGULATION VISIT (OUTPATIENT)
Dept: PHARMACY | Facility: HOSPITAL | Age: 87
End: 2022-09-16

## 2022-09-16 ENCOUNTER — HOSPITAL ENCOUNTER (OUTPATIENT)
Dept: PHYSICAL THERAPY | Facility: HOSPITAL | Age: 87
Setting detail: THERAPIES SERIES
Discharge: HOME | End: 2022-09-16

## 2022-09-16 DIAGNOSIS — R26.89 FUNCTIONAL GAIT ABNORMALITY: ICD-10-CM

## 2022-09-16 DIAGNOSIS — M62.81 QUADRICEPS WEAKNESS: Primary | ICD-10-CM

## 2022-09-16 DIAGNOSIS — Z95.4 HISTORY OF AORTIC VALVE REPLACEMENT WITH METALLIC VALVE: Primary | ICD-10-CM

## 2022-09-16 DIAGNOSIS — Z91.81 RISK FOR FALLS: ICD-10-CM

## 2022-09-16 DIAGNOSIS — I63.411 CEREBROVASCULAR ACCIDENT (CVA) DUE TO EMBOLISM OF RIGHT MIDDLE CEREBRAL ARTERY: ICD-10-CM

## 2022-09-16 LAB — INR PPP: 3.3

## 2022-09-16 PROCEDURE — 97530 THERAPEUTIC ACTIVITIES: CPT

## 2022-09-16 PROCEDURE — 97110 THERAPEUTIC EXERCISES: CPT

## 2022-09-16 NOTE — THERAPY TREATMENT NOTE
Outpatient Physical Therapy Neuro Treatment Note  [facility]     Patient Name: Francisco Sequeira  : 1935  MRN: [id number]  Today's Date: 2022      Visit Date: 2022    Visit Dx:    ICD-10-CM ICD-9-CM   1. Quadriceps weakness  M62.81 728.87   2. Functional gait abnormality  R26.89 781.2   3. Risk for falls  Z91.81 V15.88       Patient Active Problem List   Diagnosis   • Atrial fibrillation (HCC)   • Hypertension   • Atopic rhinitis   • Gastroesophageal reflux disease   • Hyperlipidemia   • Type 2 diabetes mellitus (HCC)   • Low testosterone   • History of aortic valve replacement with metallic valve   • Kidney carcinoma (HCC)   • Stage 3b chronic kidney disease (HCC)   • BYRON (acute kidney injury) (HCC)   • 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)   • Iron deficiency anemia   • Chronic anticoagulation   • Hemiparesis of left nondominant side as late effect of cerebral infarction (HCC)   • Rectus sheath hematoma   • Hospital discharge follow-up   • Sepsis (HCC)   • Calculus of gallbladder with acute cholecystitis without obstruction   • History of Clostridium difficile infection   • Aspiration pneumonitis (HCC)   • Hematoma of iliopsoas muscle, left, initial encounter   • History of CVA (cerebrovascular accident)   • S/P laparoscopic cholecystectomy   • Anemia   • Hematoma of left iliopsoas muscle            PT Neuro     Row Name 22 1446             Subjective Comments    Subjective Comments Doing okay  -LB              Precautions and Contraindications    Precautions/Limitations fall precautions  -LB      Precautions Significant quad weakness  -LB      Contraindications Needs to wear dynamic knee brace on left  -LB              Subjective Pain    Able to rate subjective pain? yes  -LB      Pre-Treatment Pain Level 0  -LB      Post-Treatment Pain Level 0  -LB              Cognition    Overall Cognitive Status WFL  -LB               Posture/Observations    Posture/Observations Comments PT arrived to unit in transport wheelchair.  Spouse present.  Pt wearing knee brace.  Brace with only 1 band on each side  -LB              Transfers    Sit-Stand [place] (Transfers) standby assist  -LB      Stand-Sit [place] (Transfers) standby assist;verbal cues  -LB      Transfers, Sit-Stand-Sit, Assist Device rolling walker  -LB      Comment, (Transfers) brace unlocked  -LB              Gait/Stairs (Locomotion)    [place] Level (Gait) contact guard;verbal cues  -LB      Assistive Device (Gait) walker, front-wheeled  [place] Trigger Lock Premier sheel orthosis with tension bands in unlocked position  -LB      Distance in Feet (Gait) 100 x 2; 60  -LB      Pattern (Gait) step-through  -LB      Deviations/Abnormal Patterns (Gait) bilateral deviations;base of support, narrow;gait speed decreased;stride length decreased  -LB      Bilateral Gait Deviations forward flexed posture;heel strike decreased;weight shift ability decreased  -LB      Left Sided Gait Deviations decreased knee extension;heel strike decreased  -LB      Right Sided Gait Deviations --  dec step length  -LB              Balance Skills Training    Gait Balance-Level of Assistance Contact guard;Minimum assistance  -LB      Gait Balance Support Left upper extremity supported;Right upper extremity supported;danny bar  -LB      Gait Balance Activities side-stepping  -LB              Orthotics & Prosthetics Management    Orthosis Location knee orthosis  -LB      Additional Documentation Orthosis Location (Row)  -LB              Knee Orthosis Management    Type (Knee Orthosis) left  -LB      Fabrication Comment (Knee Orthosis) [place] Trigger Lock Premier sheel orthosis with tension bands in unlocked position  -LB      Therapeutic Indications (Knee Orthosis) compensation for lost function  -LB      Wearing Schedule (Knee Orthosis) wear with activity/work  -LB            User Key  (r) =  "Recorded By, (t) = Taken By, (c) = Cosigned By    Initials Name Provider Type    Nalini Grey PT Physical Therapist                                OP Exercises     Row Name 09/16/22 1532 09/16/22 1446          Subjective Comments    Subjective Comments -- Doing okay  -LB            Subjective Pain    Able to rate subjective pain? -- yes  -LB     Pre-Treatment Pain Level -- 0  -LB     Post-Treatment Pain Level -- 0  -LB            Total Minutes    59792 - PT Therapeutic Exercise Minutes 25  -LB --     17270 - PT Therapeutic Activity Minutes 20  -LB --            Exercise 6    Exercise Name 6 -- seated knee flexion  -LB     Sets 6 -- 1  -LB     Reps 6 -- 10  -LB            Exercise 10    Exercise Name 10 -- left foot placement on 4 inch step  -LB     Sets 10 -- 1  -LB     Reps 10 -- 10  -LB     Additional Comments -- UE support, cues for upright  -LB            Exercise 11    Exercise Name 11 -- step taps R to 4\" step at//  bars.  -LB     Sets 11 -- 1  -LB     Reps 11 -- 10  -LB     Additional Comments -- left knee brace locked  -LB            Exercise 12    Exercise Name 12 -- sit to stand  -LB     Reps 12 -- 5  -LB     Additional Comments -- from elevated mat  -LB            Exercise 13    Exercise Name 13 -- Standing hip flex/abduction  -LB     Sets 13 -- 1  -LB     Reps 13 -- 10  -LB     Additional Comments -- on left with left knee brace locked  -LB           User Key  (r) = Recorded By, (t) = Taken By, (c) = Cosigned By    Initials Name Provider Type    Nalini Grey PT Physical Therapist                                               Time Calculation:   Start Time: 1430  Stop Time: 1515  Time Calculation (min): 45 min  Timed Charges  50845 - PT Therapeutic Exercise Minutes: 25  47996 - PT Therapeutic Activity Minutes: 20  Total Minutes  Timed Charges Total Minutes: 45   Total Minutes: 45   Therapy Charges for Today     Code Description Service Date Service Provider Modifiers Qty    37209741004  PT " THER PROC EA 15 MIN 9/16/2022 Nalini Cano, PT GP 2    [id number]  PT THERAPEUTIC ACT EA 15 MIN 9/16/2022 Nalini Cano, PT GP 1            Patient was wearing a face mask during this therapy encounter. Therapist used appropriate personal protective equipment including mask and gloves.  Mask used was standard procedure mask. Appropriate PPE was worn during the entire therapy session. Hand hygiene was completed before and after therapy session. Patient is not in enhanced droplet precautions.             Nalini Cano, PT  9/16/2022

## 2022-09-16 NOTE — PROGRESS NOTES
Anticoagulation Clinic Progress Note    Anticoagulation Summary  As of 9/16/2022    INR goal:  3.0-3.5   TTR:  68.5 % (3.7 y)   INR used for dosing:  3.30 (9/16/2022)   Warfarin maintenance plan:  5 mg every Thu; 7.5 mg all other days   Weekly warfarin total:  50 mg   No change documented:  Natty Sullivan   Plan last modified:  Michelle Piña RPH (7/8/2022)   Next INR check:  9/23/2022   Priority:  High   Target end date:  Indefinite    Indications    History of aortic valve replacement with metallic valve [Z95.4]  Atrial fibrillation (HCC) [I48.91]  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]             Anticoagulation Episode Summary     INR check location:      Preferred lab:      Send INR reminders to:  [facility]  POOL    Comments:  New INR goal 3 - 3.5 (see 1/2020 hospitalization for CVA)      Anticoagulation Care Providers     Provider Role Specialty Phone number    Griffin Fried MD Referring Cardiology [phone]          Clinic Interview:  No pertinent clinical findings have been reported.    INR History:  Anticoagulation Monitoring 9/2/2022 9/9/2022 9/16/2022   INR 3.50 3.30 3.30   INR Date 9/2/2022 9/9/2022 9/16/2022   INR Goal 3.0-3.5 3.0-3.5 3.0-3.5   Trend Same Same Same   Last Week Total 50 mg 50 mg 50 mg   Next Week Total 50 mg 50 mg 50 mg   Sun 7.5 mg 7.5 mg 7.5 mg   Mon 7.5 mg 7.5 mg 7.5 mg   Tue 7.5 mg 7.5 mg 7.5 mg   Wed 7.5 mg 7.5 mg 7.5 mg   Thu 5 mg 5 mg 5 mg   Fri 7.5 mg 7.5 mg 7.5 mg   Sat 7.5 mg 7.5 mg 7.5 mg   Visit Report - - -   Some recent data might be hidden       Plan:  1. INR is therapeutic today- see above in Anticoagulation Summary.    Francisco Sequeira to continue their warfarin regimen- see above in Anticoagulation Summary.  2. Follow up in 1 week  3. Pt has agreed to only be called if INR out of range. They have been instructed to call if any changes in medications, doses, concerns, etc.  Patient expresses understanding and has no further questions at this time.    Natty Sullivan

## 2022-09-19 ENCOUNTER — HOSPITAL ENCOUNTER (OUTPATIENT)
Dept: PHYSICAL THERAPY | Facility: HOSPITAL | Age: 87
Setting detail: THERAPIES SERIES
Discharge: HOME | End: 2022-09-19

## 2022-09-19 DIAGNOSIS — M62.81 QUADRICEPS WEAKNESS: Primary | ICD-10-CM

## 2022-09-19 DIAGNOSIS — S70.12XA HEMATOMA OF ILIOPSOAS MUSCLE, LEFT, INITIAL ENCOUNTER: ICD-10-CM

## 2022-09-19 DIAGNOSIS — R26.89 FUNCTIONAL GAIT ABNORMALITY: ICD-10-CM

## 2022-09-19 DIAGNOSIS — Z91.81 RISK FOR FALLS: ICD-10-CM

## 2022-09-19 PROCEDURE — 97110 THERAPEUTIC EXERCISES: CPT

## 2022-09-19 PROCEDURE — 97116 GAIT TRAINING THERAPY: CPT

## 2022-09-19 NOTE — THERAPY TREATMENT NOTE
Outpatient Physical Therapy Neuro Treatment Note  [facility]     Patient Name: Francisco eSqueira  : 1935  MRN: [id number]  Today's Date: 2022      Visit Date: 2022    Visit Dx:    ICD-10-CM ICD-9-CM   1. Quadriceps weakness  M62.81 728.87   2. Functional gait abnormality  R26.89 781.2   3. Risk for falls  Z91.81 V15.88   4. Hematoma of iliopsoas muscle, left, initial encounter  S70.12XA 924.00       Patient Active Problem List   Diagnosis   • Atrial fibrillation (HCC)   • Hypertension   • Atopic rhinitis   • Gastroesophageal reflux disease   • Hyperlipidemia   • Type 2 diabetes mellitus (HCC)   • Low testosterone   • History of aortic valve replacement with metallic valve   • Kidney carcinoma (HCC)   • Stage 3b chronic kidney disease (HCC)   • BYRON (acute kidney injury) (HCC)   • 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)   • Iron deficiency anemia   • Chronic anticoagulation   • Hemiparesis of left nondominant side as late effect of cerebral infarction (HCC)   • Rectus sheath hematoma   • Hospital discharge follow-up   • Sepsis (HCC)   • Calculus of gallbladder with acute cholecystitis without obstruction   • History of Clostridium difficile infection   • Aspiration pneumonitis (HCC)   • Hematoma of iliopsoas muscle, left, initial encounter   • History of CVA (cerebrovascular accident)   • S/P laparoscopic cholecystectomy   • Anemia   • Hematoma of left iliopsoas muscle            PT Neuro     Row Name 22 1515             Subjective Comments    Subjective Comments I want to keep trying  -LB              Precautions and Contraindications    Precautions/Limitations fall precautions  -LB      Precautions Significant quad weakness  -LB      Contraindications Needs to wear dynamic knee brace on left  -LB              Subjective Pain    Able to rate subjective pain? yes  -LB      Pre-Treatment Pain Level 0  -LB      Post-Treatment Pain Level 0  -LB               Cognition    Overall Cognitive Status WFL  -LB              Posture/Observations    Posture/Observations Comments PT arrived to unit in transport wheelchair.  Spouse present.  Pt wearing knee brace.  Brace with only 1 band on each side  -LB              Transfers    Sit-Stand [place] (Transfers) standby assist  -LB      Stand-Sit [place] (Transfers) standby assist;verbal cues  -LB      Transfers, Sit-Stand-Sit, Assist Device rolling walker  -LB      Comment, (Transfers) brace unlocked  -LB              Gait/Stairs (Locomotion)    [place] Level (Gait) contact guard;verbal cues  -LB      Assistive Device (Gait) walker, front-wheeled  [place] Trigger Lock Premier sheel orthosis with tension bands in unlocked position  -LB      Distance in Feet (Gait) 80; 100  -LB      Deviations/Abnormal Patterns (Gait) bilateral deviations;base of support, narrow;gait speed decreased;stride length decreased  -LB      Bilateral Gait Deviations forward flexed posture;heel strike decreased;weight shift ability decreased  -LB      Left Sided Gait Deviations decreased knee extension;heel strike decreased  -LB      Right Sided Gait Deviations heel strike decreased  -LB      Gait Assessment/Intervention While walking for 60 ft with FWW and brace in locked position, worked on the patient increasing bilateral step length and heelstrike and trying to maintain more hip extension  -LB      [place] Level (Stairs) verbal cues;minimum assist (75% patient effort);moderate assist (50% patient effort)  -LB      Handrail Location (Stairs) both sides  -LB      Number of Steps (Stairs) 4; 8 smaller 8 inch step  -LB      Ascending Technique (Stairs) step-to-step  -LB      Descending Technique (Stairs) step-to-step  -LB      Stairs, Safety Issues loses balance backward  -LB      Stairs, Impairments impaired balance;strength decreased  -LB      Comment, (Gait/Stairs) When descending, pt displayed posterior lean and back  weight shift  -LB              Knee Orthosis Management    Type (Knee Orthosis) left  -LB      Fabrication Comment (Knee Orthosis) [place] Trigger Lock Premier sheel orthosis with tension bands  -LB      Therapeutic Indications (Knee Orthosis) compensation for lost function  -LB      Wearing Schedule (Knee Orthosis) wear when out of bed only  -LB            User Key  (r) = Recorded By, (t) = Taken By, (c) = Cosigned By    Initials Name Provider Type    Nalini Grey, PT Physical Therapist                         PT Assessment/Plan     Row Name 09/19/22 1556          PT Assessment    Assessment Comments The patient did well ascending steps with CG to Min.  Descending the patient displayed greater posterior lean with decreased weight shift forward and significant weight bearing through UEs.  With good concentration, the paitent was able to increase step length and heelstrike on bilateral LEs while ambulating  -LB           User Key  (r) = Recorded By, (t) = Taken By, (c) = Cosigned By    Initials Name Provider Type    Nalini Grey, PT Physical Therapist                    OP Exercises     Row Name 09/19/22 1557 09/19/22 1058          Subjective Comments    Subjective Comments -- I want to keep trying  -LB            Subjective Pain    Able to rate subjective pain? -- yes  -LB     Pre-Treatment Pain Level -- 0  -LB     Post-Treatment Pain Level -- 0  -LB            Total Minutes    20606 - Gait Training Minutes  24  -LB --     86031 - PT Therapeutic Exercise Minutes 21  -LB --            Exercise 6    Exercise Name 6 -- seated knee flexion  -LB     Sets 6 -- 1  -LB     Reps 6 -- 10  -LB            Exercise 12    Exercise Name 12 -- sit to stand  -LB     Reps 12 -- 5  -LB            Exercise 13    Exercise Name 13 -- right hip flexion in stance with UE support  -LB     Sets 13 -- 1  -LB     Reps 13 -- 10  -LB            Exercise 14    Exercise Name 14 -- mini squats  -LB     Sets 14 -- 2  -LB     Reps 14 -- 5   -LB     Additional Comments -- cues for knee extension  -LB           User Key  (r) = Recorded By, (t) = Taken By, (c) = Cosigned By    Initials Name Provider Type    Nalini Grey PT Physical Therapist                                Therapy Education  Education Details: stair climbing  Given: Mobility training  Program: New  How Provided: Verbal  Provided to: Patient  Level of Understanding: Verbalized              Time Calculation:   Start Time: 1300  Stop Time: 1345  Time Calculation (min): 45 min  Timed Charges  25957 - PT Therapeutic Exercise Minutes: 21  41608 - Gait Training Minutes : 24  Total Minutes  Timed Charges Total Minutes: 45   Total Minutes: 45   Therapy Charges for Today     Code Description Service Date Service Provider Modifiers Qty    35942473185  PT THER PROC EA 15 MIN 9/19/2022 Nalini Cano, PT GP 1    [id number]  GAIT TRAINING EA 15 MIN 9/19/2022 Nalini Cano PT GP 2              Patient was wearing a face mask during this therapy encounter. Therapist used appropriate personal protective equipment including mask and gloves.  Mask used was standard procedure mask. Appropriate PPE was worn during the entire therapy session. Hand hygiene was completed before and after therapy session. Patient is not in enhanced droplet precautions.           Nalini Cano PT  9/19/2022

## 2022-09-20 RX ORDER — SUB-Q INSULIN DEVICE, 40 UNIT
EACH MISCELLANEOUS
Qty: 30 EACH | Refills: 2 | Status: SHIPPED | OUTPATIENT
Start: 2022-09-20 | End: 2022-12-28 | Stop reason: SDUPTHER

## 2022-09-23 ENCOUNTER — HOSPITAL ENCOUNTER (OUTPATIENT)
Dept: PHYSICAL THERAPY | Facility: HOSPITAL | Age: 87
Setting detail: THERAPIES SERIES
Discharge: HOME | End: 2022-09-23

## 2022-09-23 ENCOUNTER — ANTICOAGULATION VISIT (OUTPATIENT)
Dept: PHARMACY | Facility: HOSPITAL | Age: 87
End: 2022-09-23

## 2022-09-23 DIAGNOSIS — I63.411 CEREBROVASCULAR ACCIDENT (CVA) DUE TO EMBOLISM OF RIGHT MIDDLE CEREBRAL ARTERY: ICD-10-CM

## 2022-09-23 DIAGNOSIS — M62.81 QUADRICEPS WEAKNESS: Primary | ICD-10-CM

## 2022-09-23 DIAGNOSIS — Z95.4 HISTORY OF AORTIC VALVE REPLACEMENT WITH METALLIC VALVE: Primary | ICD-10-CM

## 2022-09-23 DIAGNOSIS — S70.12XA HEMATOMA OF ILIOPSOAS MUSCLE, LEFT, INITIAL ENCOUNTER: ICD-10-CM

## 2022-09-23 DIAGNOSIS — Z91.81 RISK FOR FALLS: ICD-10-CM

## 2022-09-23 DIAGNOSIS — R26.89 FUNCTIONAL GAIT ABNORMALITY: ICD-10-CM

## 2022-09-23 LAB — INR PPP: 3.2

## 2022-09-23 PROCEDURE — 97110 THERAPEUTIC EXERCISES: CPT

## 2022-09-23 PROCEDURE — 97116 GAIT TRAINING THERAPY: CPT

## 2022-09-23 NOTE — PROGRESS NOTES
Anticoagulation Clinic Progress Note    Anticoagulation Summary  As of 9/23/2022    INR goal:  3.0-3.5   TTR:  68.6 % (3.7 y)   INR used for dosing:  3.20 (9/23/2022)   Warfarin maintenance plan:  5 mg every Thu; 7.5 mg all other days   Weekly warfarin total:  50 mg   No change documented:  Natty Sullivan   Plan last modified:  Michelle Piña RPH (7/8/2022)   Next INR check:  9/30/2022   Priority:  High   Target end date:  Indefinite    Indications    History of aortic valve replacement with metallic valve [Z95.4]  Atrial fibrillation (HCC) [I48.91]  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]             Anticoagulation Episode Summary     INR check location:      Preferred lab:      Send INR reminders to:  [facility]  POOL    Comments:  New INR goal 3 - 3.5 (see 1/2020 hospitalization for CVA)      Anticoagulation Care Providers     Provider Role Specialty Phone number    Griffin Fried MD Referring Cardiology [phone]          Clinic Interview:  No pertinent clinical findings have been reported.    INR History:  Anticoagulation Monitoring 9/9/2022 9/16/2022 9/23/2022   INR 3.30 3.30 3.20   INR Date 9/9/2022 9/16/2022 9/23/2022   INR Goal 3.0-3.5 3.0-3.5 3.0-3.5   Trend Same Same Same   Last Week Total 50 mg 50 mg 50 mg   Next Week Total 50 mg 50 mg 50 mg   Sun 7.5 mg 7.5 mg 7.5 mg   Mon 7.5 mg 7.5 mg 7.5 mg   Tue 7.5 mg 7.5 mg 7.5 mg   Wed 7.5 mg 7.5 mg 7.5 mg   Thu 5 mg 5 mg 5 mg   Fri 7.5 mg 7.5 mg 7.5 mg   Sat 7.5 mg 7.5 mg 7.5 mg   Visit Report - - -   Some recent data might be hidden       Plan:  1. INR is therapeutic today- see above in Anticoagulation Summary.    Francisco Sequeira to continue their warfarin regimen- see above in Anticoagulation Summary.  2. Follow up in 1 week  3. Pt has agreed to only be called if INR out of range. They have been instructed to call if any changes in medications, doses, concerns, etc.  Patient expresses understanding and has no further questions at this time.    Natty Sullivan

## 2022-09-23 NOTE — THERAPY TREATMENT NOTE
Outpatient Physical Therapy Neuro Treatment Note  [facility]     Patient Name: Francisco Sequeira  : 1935  MRN: [id number]  Today's Date: 2022      Visit Date: 2022    Visit Dx:    ICD-10-CM ICD-9-CM   1. Quadriceps weakness  M62.81 728.87   2. Functional gait abnormality  R26.89 781.2   3. Risk for falls  Z91.81 V15.88   4. Hematoma of iliopsoas muscle, left, initial encounter  S70.12XA 924.00       Patient Active Problem List   Diagnosis   • Atrial fibrillation (HCC)   • Hypertension   • Atopic rhinitis   • Gastroesophageal reflux disease   • Hyperlipidemia   • Type 2 diabetes mellitus (HCC)   • Low testosterone   • History of aortic valve replacement with metallic valve   • Kidney carcinoma (HCC)   • Stage 3b chronic kidney disease (HCC)   • BYRON (acute kidney injury) (HCC)   • 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)   • Iron deficiency anemia   • Chronic anticoagulation   • Hemiparesis of left nondominant side as late effect of cerebral infarction (HCC)   • Rectus sheath hematoma   • Hospital discharge follow-up   • Sepsis (HCC)   • Calculus of gallbladder with acute cholecystitis without obstruction   • History of Clostridium difficile infection   • Aspiration pneumonitis (HCC)   • Hematoma of iliopsoas muscle, left, initial encounter   • History of CVA (cerebrovascular accident)   • S/P laparoscopic cholecystectomy   • Anemia   • Hematoma of left iliopsoas muscle            PT Neuro     Row Name 22 1446             Precautions and Contraindications    Precautions/Limitations fall precautions  -LB      Precautions Significant quad weakness  -LB              Subjective Pain    Able to rate subjective pain? yes  -LB      Pre-Treatment Pain Level 0  -LB      Post-Treatment Pain Level 0  -LB              Cognition    Overall Cognitive Status WFL  -LB      Arousal/Alertness Appropriate responses to stimuli  -LB               Posture/Observations    Posture/Observations Comments PT arrived to unit in transport wheelchair.  Spouse present.  Pt wearing knee brace.  Brace with only 1 band on each side  -LB              Transfers    Sit-Stand [place] (Transfers) standby assist  -LB      Stand-Sit [place] (Transfers) standby assist;verbal cues  -LB      Transfers, Sit-Stand-Sit, Assist Device rolling walker  -LB      Comment, (Transfers) brace unlocked  -LB              Gait/Stairs (Locomotion)    [place] Level (Gait) contact guard;verbal cues  -LB      Assistive Device (Gait) walker, front-wheeled  [place] Trigger Lock Premier sheel orthosis with tension bands in unlocked position  -LB      Distance in Feet (Gait) 200  -LB      Pattern (Gait) step-through  -LB      Deviations/Abnormal Patterns (Gait) bilateral deviations;base of support, narrow;gait speed decreased;stride length decreased  -LB      Bilateral Gait Deviations forward flexed posture;heel strike decreased;weight shift ability decreased  -LB      Left Sided Gait Deviations decreased knee extension;heel strike decreased  -LB      Right Sided Gait Deviations heel strike decreased  -LB            User Key  (r) = Recorded By, (t) = Taken By, (c) = Cosigned By    Initials Name Provider Type    Nalini Grey PT Physical Therapist                         PT Assessment/Plan     Row Name 09/23/22 9681          PT Assessment    Assessment Comments The patient displayed increased tolerance with longer distance ambulating but did take extra time to complete. No change with quad strength noted but concentrated more on hip extension strengthening to help with knee control and upright posture with gait.  -LB           User Key  (r) = Recorded By, (t) = Taken By, (c) = Cosigned By    Initials Name Provider Type    Nalini Grey PT Physical Therapist                    OP Exercises     Row Name 09/23/22 1600 09/23/22 1446          Subjective Pain    Able to rate subjective  pain? -- yes  -LB     Pre-Treatment Pain Level -- 0  -LB     Post-Treatment Pain Level -- 0  -LB            Total Minutes    76791 - Gait Training Minutes  15  -LB --     41729 - PT Therapeutic Exercise Minutes 30  -LB --            Exercise 3    Exercise Name 3 -- bridging  -LB     Sets 3 -- 2  -LB     Reps 3 -- 10  -LB            Exercise 7    Exercise Name 7 -- SKTC  -LB     Sets 7 -- 2  -LB     Reps 7 -- 10  -LB            Exercise 8    Exercise Name 8 -- L knee drop out  -LB     Sets 8 -- 2  -LB     Reps 8 -- 10  -LB            Exercise 12    Exercise Name 12 -- sit to stand  -LB     Reps 12 -- 5  -LB     Additional Comments -- manual cues to attempt to knee left knee engage  -LB            Exercise 15    Exercise Name 15 -- double knee/hip flexion on peanut ball  -LB     Reps 15 -- 10  -LB           User Key  (r) = Recorded By, (t) = Taken By, (c) = Cosigned By    Initials Name Provider Type    Nalini Grey PT Physical Therapist                                               Time Calculation:   Start Time: 1430  Stop Time: 1515  Time Calculation (min): 45 min  Timed Charges  07283 - PT Therapeutic Exercise Minutes: 30  09925 - Gait Training Minutes : 15  Total Minutes  Timed Charges Total Minutes: 45   Total Minutes: 45   Therapy Charges for Today     Code Description Service Date Service Provider Modifiers Qty    [id number]  GAIT TRAINING EA 15 MIN 9/23/2022 Nalini Cano, PT GP 1    [id number]  PT THER PROC EA 15 MIN 9/23/2022 Nalini Cano, PT GP 2            Patient was wearing a face mask during this therapy encounter. Therapist used appropriate personal protective equipment including mask and gloves.  Mask used was standard procedure mask. Appropriate PPE was worn during the entire therapy session. Hand hygiene was completed before and after therapy session. Patient is not in enhanced droplet precautions.             Nalini Cano PT  9/23/2022

## 2022-09-26 ENCOUNTER — HOSPITAL ENCOUNTER (OUTPATIENT)
Dept: PHYSICAL THERAPY | Facility: HOSPITAL | Age: 87
Setting detail: THERAPIES SERIES
Discharge: HOME | End: 2022-09-26

## 2022-09-26 DIAGNOSIS — M62.81 QUADRICEPS WEAKNESS: Primary | ICD-10-CM

## 2022-09-26 DIAGNOSIS — R26.89 FUNCTIONAL GAIT ABNORMALITY: ICD-10-CM

## 2022-09-26 DIAGNOSIS — S70.12XA HEMATOMA OF ILIOPSOAS MUSCLE, LEFT, INITIAL ENCOUNTER: ICD-10-CM

## 2022-09-26 DIAGNOSIS — Z91.81 RISK FOR FALLS: ICD-10-CM

## 2022-09-26 PROCEDURE — 97530 THERAPEUTIC ACTIVITIES: CPT

## 2022-09-26 PROCEDURE — 97110 THERAPEUTIC EXERCISES: CPT

## 2022-09-26 NOTE — THERAPY TREATMENT NOTE
Outpatient Physical Therapy Neuro Treatment Note  [facility]     Patient Name: Francisco Sequeira  : 1935  MRN: [id number]  Today's Date: 2022      Visit Date: 2022    Visit Dx:    ICD-10-CM ICD-9-CM   1. Quadriceps weakness  M62.81 728.87   2. Functional gait abnormality  R26.89 781.2   3. Risk for falls  Z91.81 V15.88   4. Hematoma of iliopsoas muscle, left, initial encounter  S70.12XA 924.00       Patient Active Problem List   Diagnosis   • Atrial fibrillation (HCC)   • Hypertension   • Atopic rhinitis   • Gastroesophageal reflux disease   • Hyperlipidemia   • Type 2 diabetes mellitus (HCC)   • Low testosterone   • History of aortic valve replacement with metallic valve   • Kidney carcinoma (HCC)   • Stage 3b chronic kidney disease (HCC)   • BYRON (acute kidney injury) (HCC)   • 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)   • Iron deficiency anemia   • Chronic anticoagulation   • Hemiparesis of left nondominant side as late effect of cerebral infarction (HCC)   • Rectus sheath hematoma   • Hospital discharge follow-up   • Sepsis (HCC)   • Calculus of gallbladder with acute cholecystitis without obstruction   • History of Clostridium difficile infection   • Aspiration pneumonitis (HCC)   • Hematoma of iliopsoas muscle, left, initial encounter   • History of CVA (cerebrovascular accident)   • S/P laparoscopic cholecystectomy   • Anemia   • Hematoma of left iliopsoas muscle            PT Neuro     Row Name 22 1535             Subjective Comments    Subjective Comments I will have to continue to do  my exercises  -LB              Precautions and Contraindications    Precautions/Limitations fall precautions  -LB      Precautions Significant quad weakness  -LB      Contraindications Needs to wear dynamic knee brace on left  -LB              Subjective Pain    Able to rate subjective pain? yes  -LB      Pre-Treatment Pain Level 0  -LB       Post-Treatment Pain Level 0  -LB              Cognition    Overall Cognitive Status WFL  -LB      Arousal/Alertness Appropriate responses to stimuli  -LB      Memory Decreased recall of recent events  -LB      Orientation Level Oriented to situation  -LB      Safety Judgment Good awareness of safety precautions  -LB      Deficits Fully aware of deficits  -LB              Posture/Observations    Posture/Observations Comments PT arrived to unit in transport wheelchair.  Spouse present.  Pt wearing knee brace.  Brace with only 1 band on each side  -LB              Transfers    Sit-Stand [place] (Transfers) modified independence  -LB      Stand-Sit [place] (Transfers) modified independence  -LB      Transfers, Sit-Stand-Sit, Assist Device rolling walker  -LB      Comment, (Transfers) Transfer in and out of armless chairs and SBA and walker  -LB              Gait/Stairs (Locomotion)    [place] Level (Gait) standby assist  -LB      Assistive Device (Gait) walker, front-wheeled  [place] Trigger Lock Premier sheel orthosis with tension bands in unlocked position  -LB      Distance in Feet (Gait) 200; 100  -LB      Pattern (Gait) step-through  -LB      Deviations/Abnormal Patterns (Gait) bilateral deviations;base of support, narrow;gait speed decreased;stride length decreased  -LB      Bilateral Gait Deviations forward flexed posture;heel strike decreased;weight shift ability decreased  -LB      Left Sided Gait Deviations decreased knee extension;heel strike decreased  -LB      Right Sided Gait Deviations heel strike decreased  -LB      Gait Assessment/Intervention Cues for upright posture with ambulation,  -LB              Balance Skills Training    Standing-Level of Assistance Contact guard  -LB      Static Standing Balance Support assistive device;No upper extremity supported  -LB      Standing Balance # of Minutes With brace locked, pt stood and performed bean bag toss for 2 min, pt displayed trunk  "flexion  -LB      Gait Balance-Level of Assistance Contact guard  -LB      Gait Balance Support Left upper extremity supported;Right upper extremity supported;danny bar  -LB      Gait Balance Activities side-stepping  -LB              Knee Orthosis Management    Type (Knee Orthosis) left  -LB      Fabrication Comment (Knee Orthosis) [place] Trigger Lock Premier sheel orthosis with tension bands  -LB      Therapeutic Indications (Knee Orthosis) compensation for lost function  -LB      Wearing Schedule (Knee Orthosis) wear with activity/work  -LB            User Key  (r) = Recorded By, (t) = Taken By, (c) = Cosigned By    Initials Name Provider Type    Nalini Grey, PT Physical Therapist                         PT Assessment/Plan     Row Name 09/26/22 1617          PT Assessment    Assessment Comments The patient and wife understand that PT needs to take a break as there is currently no change in quad strengthening or activation.  The patient did better with coming to stand from armless chair and static standing for about 2 mins with brace in locked position.  -LB           User Key  (r) = Recorded By, (t) = Taken By, (c) = Cosigned By    Initials Name Provider Type    Nalini Grey, PT Physical Therapist                    OP Exercises     Row Name 09/26/22 1619 09/26/22 1535          Subjective Comments    Subjective Comments -- I will have to continue to do  my exercises  -LB            Subjective Pain    Able to rate subjective pain? -- yes  -LB     Pre-Treatment Pain Level -- 0  -LB     Post-Treatment Pain Level -- 0  -LB            Total Minutes    00992 - PT Therapeutic Exercise Minutes 20  -LB --     24891 - PT Therapeutic Activity Minutes 25  -LB --            Exercise 6    Exercise Name 6 -- seated knee flexion  -LB     Reps 6 -- 10  -LB     Additional Comments -- with assist  -LB            Exercise 11    Exercise Name 11 -- step taps R to 4\" step at//  bars.  -LB     Sets 11 -- 1  -LB     Reps 11 -- " 10  -LB            Exercise 13    Exercise Name 13 -- right hip flexion in stance with UE support  -LB     Sets 13 -- 1  -LB     Reps 13 -- 10  -LB     Additional Comments -- on left with brace  -LB           User Key  (r) = Recorded By, (t) = Taken By, (c) = Cosigned By    Initials Name Provider Type    Nalini Grey, PT Physical Therapist                                Therapy Education  Education Details: Ending therapy next session  Given: Other (comment)  Program: New  How Provided: Verbal  Provided to: Patient, Caregiver  Level of Understanding: Verbalized              Time Calculation:   Start Time: 1515  Stop Time: 1600  Time Calculation (min): 45 min  Timed Charges  99038 - PT Therapeutic Exercise Minutes: 20  76484 - PT Therapeutic Activity Minutes: 25  Total Minutes  Timed Charges Total Minutes: 45   Total Minutes: 45   Therapy Charges for Today     Code Description Service Date Service Provider Modifiers Qty    90870005806  PT THER PROC EA 15 MIN 9/26/2022 Nalini Cano, PT GP 1    [id number]  PT THERAPEUTIC ACT EA 15 MIN 9/26/2022 Nalini Cano, PT GP 2                Patient was wearing a face mask during this therapy encounter. Therapist used appropriate personal protective equipment including mask and gloves.  Mask used was standard procedure mask. Appropriate PPE was worn during the entire therapy session. Hand hygiene was completed before and after therapy session. Patient is not in enhanced droplet precautions.         Nalini Cano PT  9/26/2022

## 2022-09-30 ENCOUNTER — ANTICOAGULATION VISIT (OUTPATIENT)
Dept: PHARMACY | Facility: HOSPITAL | Age: 87
End: 2022-09-30

## 2022-09-30 ENCOUNTER — HOSPITAL ENCOUNTER (OUTPATIENT)
Dept: PHYSICAL THERAPY | Facility: HOSPITAL | Age: 87
Setting detail: THERAPIES SERIES
Discharge: HOME | End: 2022-09-30

## 2022-09-30 DIAGNOSIS — Z91.81 RISK FOR FALLS: ICD-10-CM

## 2022-09-30 DIAGNOSIS — S70.12XA HEMATOMA OF ILIOPSOAS MUSCLE, LEFT, INITIAL ENCOUNTER: ICD-10-CM

## 2022-09-30 DIAGNOSIS — I63.411 CEREBROVASCULAR ACCIDENT (CVA) DUE TO EMBOLISM OF RIGHT MIDDLE CEREBRAL ARTERY: ICD-10-CM

## 2022-09-30 DIAGNOSIS — R26.89 FUNCTIONAL GAIT ABNORMALITY: ICD-10-CM

## 2022-09-30 DIAGNOSIS — Z95.4 HISTORY OF AORTIC VALVE REPLACEMENT WITH METALLIC VALVE: Primary | ICD-10-CM

## 2022-09-30 DIAGNOSIS — M62.81 QUADRICEPS WEAKNESS: Primary | ICD-10-CM

## 2022-09-30 LAB — INR PPP: 3.5

## 2022-09-30 PROCEDURE — 97110 THERAPEUTIC EXERCISES: CPT

## 2022-09-30 PROCEDURE — 97530 THERAPEUTIC ACTIVITIES: CPT

## 2022-09-30 NOTE — PROGRESS NOTES
Anticoagulation Clinic Progress Note    Anticoagulation Summary  As of 9/30/2022    INR goal:  3.0-3.5   TTR:  68.8 % (3.7 y)   INR used for dosing:  3.50 (9/30/2022)   Warfarin maintenance plan:  5 mg every Thu; 7.5 mg all other days   Weekly warfarin total:  50 mg   No change documented:  Natty Sullivan   Plan last modified:  Michelle Piña RPH (7/8/2022)   Next INR check:  10/7/2022   Priority:  High   Target end date:  Indefinite    Indications    History of aortic valve replacement with metallic valve [Z95.4]  Atrial fibrillation (HCC) [I48.91]  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]             Anticoagulation Episode Summary     INR check location:      Preferred lab:      Send INR reminders to:  [facility]  POOL    Comments:  New INR goal 3 - 3.5 (see 1/2020 hospitalization for CVA)      Anticoagulation Care Providers     Provider Role Specialty Phone number    Griffin Fried MD Referring Cardiology [phone]          Clinic Interview:  No pertinent clinical findings have been reported.    INR History:  Anticoagulation Monitoring 9/16/2022 9/23/2022 9/30/2022   INR 3.30 3.20 3.50   INR Date 9/16/2022 9/23/2022 9/30/2022   INR Goal 3.0-3.5 3.0-3.5 3.0-3.5   Trend Same Same Same   Last Week Total 50 mg 50 mg 50 mg   Next Week Total 50 mg 50 mg 50 mg   Sun 7.5 mg 7.5 mg 7.5 mg   Mon 7.5 mg 7.5 mg 7.5 mg   Tue 7.5 mg 7.5 mg 7.5 mg   Wed 7.5 mg 7.5 mg 7.5 mg   Thu 5 mg 5 mg 5 mg   Fri 7.5 mg 7.5 mg 7.5 mg   Sat 7.5 mg 7.5 mg 7.5 mg   Visit Report - - -   Some recent data might be hidden       Plan:  1. INR is therapeutic today- see above in Anticoagulation Summary.    Francisco Sequeira to continue their warfarin regimen- see above in Anticoagulation Summary.  2. Follow up in 1 week  3. Pt has agreed to only be called if INR out of range. They have been instructed to call if any changes in medications, doses, concerns, etc.  Patient expresses understanding and has no further questions at this time.    Natty Sullivan

## 2022-10-03 ENCOUNTER — APPOINTMENT (OUTPATIENT)
Dept: PHYSICAL THERAPY | Facility: HOSPITAL | Age: 87
End: 2022-10-03

## 2022-10-06 ENCOUNTER — OFFICE VISIT (OUTPATIENT)
Dept: INTERNAL MEDICINE | Facility: CLINIC | Age: 87
End: 2022-10-06

## 2022-10-06 VITALS
WEIGHT: 154 LBS | BODY MASS INDEX: 20.86 KG/M2 | HEART RATE: 93 BPM | HEIGHT: 72 IN | SYSTOLIC BLOOD PRESSURE: 150 MMHG | TEMPERATURE: 97.7 F | OXYGEN SATURATION: 98 % | DIASTOLIC BLOOD PRESSURE: 90 MMHG

## 2022-10-06 DIAGNOSIS — Z23 NEED FOR IMMUNIZATION AGAINST INFLUENZA: ICD-10-CM

## 2022-10-06 DIAGNOSIS — Z23 NEED FOR PNEUMOCOCCAL VACCINE: ICD-10-CM

## 2022-10-06 DIAGNOSIS — Z00.00 MEDICARE ANNUAL WELLNESS VISIT, SUBSEQUENT: Primary | ICD-10-CM

## 2022-10-06 PROCEDURE — 90662 IIV NO PRSV INCREASED AG IM: CPT | Performed by: STUDENT IN AN ORGANIZED HEALTH CARE EDUCATION/TRAINING PROGRAM

## 2022-10-06 PROCEDURE — G0439 PPPS, SUBSEQ VISIT: HCPCS | Performed by: STUDENT IN AN ORGANIZED HEALTH CARE EDUCATION/TRAINING PROGRAM

## 2022-10-06 PROCEDURE — 1170F FXNL STATUS ASSESSED: CPT | Performed by: STUDENT IN AN ORGANIZED HEALTH CARE EDUCATION/TRAINING PROGRAM

## 2022-10-06 PROCEDURE — 1159F MED LIST DOCD IN RCRD: CPT | Performed by: STUDENT IN AN ORGANIZED HEALTH CARE EDUCATION/TRAINING PROGRAM

## 2022-10-06 PROCEDURE — G0009 ADMIN PNEUMOCOCCAL VACCINE: HCPCS | Performed by: STUDENT IN AN ORGANIZED HEALTH CARE EDUCATION/TRAINING PROGRAM

## 2022-10-06 PROCEDURE — G0008 ADMIN INFLUENZA VIRUS VAC: HCPCS | Performed by: STUDENT IN AN ORGANIZED HEALTH CARE EDUCATION/TRAINING PROGRAM

## 2022-10-06 PROCEDURE — 90677 PCV20 VACCINE IM: CPT | Performed by: STUDENT IN AN ORGANIZED HEALTH CARE EDUCATION/TRAINING PROGRAM

## 2022-10-06 NOTE — PROGRESS NOTES
The ABCs of the Annual Wellness Visit  Subsequent Medicare Wellness Visit    Chief Complaint   Patient presents with   • Medicare Wellness-subsequent      Subjective    History of Present Illness:  Francisco Sequeira is a 86 y.o. male who presents for a Subsequent Medicare Wellness Visit.    The following portions of the patient's history were reviewed and   updated as appropriate: allergies, current medications, past family history, past medical history, past social history, past surgical history and problem list.    Compared to one year ago, the patient feels his physical   health is better. No longer using wheelchair as often; now using walker. Has recently finished with physical therapy.     Compared to one year ago, the patient feels his mental   health is better.    Recent Hospitalizations:  This patient has had a [facility] admission record on file within the last 365 days.    Current Medical Providers:  Patient Care Team:  Celestine English DO as PCP - General (Internal Medicine)  Audra Vazquez RPH as Pharmacist  Vasquez Niño PharmD as Pharmacist (Pharmacy)  Tatiana Tinoco MD as Consulting Physician (Gastroenterology)    Outpatient Medications Prior to Visit   Medication Sig Dispense Refill   • acetaminophen (TYLENOL) 325 MG tablet Take 2 tablets by mouth Every 6 (Six) Hours As Needed for Mild Pain .     • atorvastatin (LIPITOR) 40 MG tablet TAKE ONE TABLET BY MOUTH DAILY 90 tablet 1   • Digoxin 62.5 MCG tablet Take 62.5 mcg by mouth Daily. 30 tablet    • diphenhydrAMINE (BENADRYL) 25 mg capsule Take 1 capsule by mouth 2 (Two) Times a Day As Needed for Itching, Allergies or Sleep.     • famotidine (PEPCID) 20 MG tablet Take 1 tablet by mouth Daily. 90 tablet 1   • ferrous gluconate (FERGON) 324 MG tablet Take 1 tablet by mouth Every Other Day. 90 tablet 1   • Insulin Disposable Pump (V-Go 40) kit Wear each V-Go for one 24-hour period. At the end of the 24-hour period, retract the needle by sliding and then  pushing the Needle Release Button and remove the V-Go from your body 30 each 2   • insulin lispro (humaLOG) 100 UNIT/ML injection Inject  under the skin into the appropriate area as directed Take As Directed. Use as directed with V-Go Pump--NOT TAKING DUE TO LOW BG LEVELS SINCE HOSPITALIZATION      • Insulin Lispro (HumaLOG) 100 UNIT/ML injection Use as directed with v-go pump (Patient taking differently: Use as directed with v-go pump) 30 mL 6   • magnesium oxide (MAG-OX) 400 MG tablet Take 400 mg by mouth 2 (Two) Times a Day.     • warfarin (COUMADIN) 5 MG tablet Take one tablet by mouth on thurs and take one and one-half tablet by mouth all other days 130 tablet 1   • metoprolol succinate XL (TOPROL-XL) 25 MG 24 hr tablet Take 0.5 tablets by mouth Daily for 90 days. 45 tablet 0     No facility-administered medications prior to visit.       No opioid medication identified on active medication list. I have reviewed chart for other potential  high risk medication/s and harmful drug interactions in the elderly.          Aspirin is not on active medication list.  Aspirin use is not indicated based on review of current medical condition/s. Risk of harm outweighs potential benefits.  .    Patient Active Problem List   Diagnosis   • Atrial fibrillation (HCC)   • Hypertension   • Atopic rhinitis   • Gastroesophageal reflux disease   • Hyperlipidemia   • Type 2 diabetes mellitus (HCC)   • Low testosterone   • History of aortic valve replacement with metallic valve   • Kidney carcinoma (HCC)   • Stage 3b chronic kidney disease (HCC)   • BYRON (acute kidney injury) (HCC)   • 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)   • Iron deficiency anemia   • Chronic anticoagulation   • Hemiparesis of left nondominant side as late effect of cerebral infarction (HCC)   • Rectus sheath hematoma   • Hospital discharge follow-up   • Sepsis (HCC)   • Calculus of gallbladder with acute cholecystitis  "without obstruction   • History of Clostridium difficile infection   • Aspiration pneumonitis (HCC)   • Hematoma of iliopsoas muscle, left, initial encounter   • History of CVA (cerebrovascular accident)   • S/P laparoscopic cholecystectomy   • Anemia   • Hematoma of left iliopsoas muscle     Advance Care Planning  Advance Directive is not on file.  ACP discussion was held with the patient during this visit. Patient has an advance directive (not in EMR), copy requested.          Objective    Vitals:    10/06/22 1008   BP: 150/90   Pulse: 93   Temp: 97.7 °F (36.5 °C)   SpO2: 98%   Weight: 69.9 kg (154 lb)   Height: 182.9 cm (72\")     Estimated body mass index is 20.89 kg/m² as calculated from the following:    Height as of this encounter: 182.9 cm (72\").    Weight as of this encounter: 69.9 kg (154 lb).    BMI is within normal parameters. No other follow-up for BMI required.      Does the patient have evidence of cognitive impairment? No    Physical Exam  Vitals reviewed.   Constitutional:       General: He is not in acute distress.     Appearance: He is normal weight. He is ill-appearing (chronically).   HENT:      Head: Atraumatic.      Right Ear: Tympanic membrane, ear canal and external ear normal. There is no impacted cerumen.      Left Ear: Tympanic membrane, ear canal and external ear normal. There is no impacted cerumen.      Ears:      Comments: Wearing Hearing aids  Eyes:      General: No scleral icterus.  Cardiovascular:      Rate and Rhythm: Normal rate. Rhythm irregular.      Comments: Mechanical valve click present  Pulmonary:      Effort: Pulmonary effort is normal. No respiratory distress.      Breath sounds: Normal breath sounds. No wheezing.   Abdominal:      General: Bowel sounds are normal. There is no distension.      Palpations: Abdomen is soft.      Tenderness: There is no abdominal tenderness.   Musculoskeletal:      Right lower leg: No edema.      Left lower leg: No edema.      Comments: " Ambulating via wheelchair. Left knee brace in place.    Skin:     Coloration: Skin is not jaundiced.   Neurological:      Mental Status: He is alert.   Psychiatric:         Mood and Affect: Mood normal.         Behavior: Behavior normal.         Thought Content: Thought content normal.       Lab Results   Component Value Date    HGBA1C 9.0 (H) 2022            HEALTH RISK ASSESSMENT    Smoking Status:  Social History     Tobacco Use   Smoking Status Former Smoker   • Packs/day: 1.00   • Years: 25.00   • Pack years: 25.00   • Types: Cigarettes   • Quit date:    • Years since quittin.7   Smokeless Tobacco Former User     Alcohol Consumption:  Social History     Substance and Sexual Activity   Alcohol Use Not Currently   • Alcohol/week: 0.0 - 1.0 standard drinks     Fall Risk Screen:    DELIA Fall Risk Assessment was completed, and patient is at MODERATE risk for falls. Assessment completed on:10/6/2022    Depression Screening:  PHQ-2/PHQ-9 Depression Screening 10/6/2022   Retired Total Score -   Little Interest or Pleasure in Doing Things 0-->not at all   Feeling Down, Depressed or Hopeless 0-->not at all   PHQ-9: Brief Depression Severity Measure Score 0       Health Habits and Functional and Cognitive Screening:  Functional & Cognitive Status 10/6/2022   Do you have difficulty preparing food and eating? No   Do you have difficulty bathing yourself, getting dressed or grooming yourself? No   Do you have difficulty using the toilet? No   Do you have difficulty moving around from place to place? Yes   Do you have trouble with steps or getting out of a bed or a chair? Yes   Current Diet Other   Dental Exam Not up to date   Eye Exam Not up to date   Exercise (times per week) 2 times per week   Current Exercises Include Weightlifting   Current Exercise Activities Include -   Do you need help using the phone?  No   Are you deaf or do you have serious difficulty hearing?  Yes   Do you need help with  transportation? Yes   Do you need help shopping? No   Do you need help preparing meals?  No   Do you need help with housework?  No   Do you need help with laundry? No   Do you need help taking your medications? No   Do you need help managing money? No   Do you ever drive or ride in a car without wearing a seat belt? No   Have you felt unusual stress, anger or loneliness in the last month? No   Who do you live with? Spouse   If you need help, do you have trouble finding someone available to you? No   Have you been bothered in the last four weeks by sexual problems? -   Do you have difficulty concentrating, remembering or making decisions? No       Age-appropriate Screening Schedule:  Refer to the list below for future screening recommendations based on patient's age, sex and/or medical conditions. Orders for these recommended tests are listed in the plan section. The patient has been provided with a written plan.    Health Maintenance   Topic Date Due   • TDAP/TD VACCINES (1 - Tdap) Never done   • ZOSTER VACCINE (2 of 2) 02/26/2015   • DIABETIC EYE EXAM  10/01/2017   • URINE MICROALBUMIN  08/28/2021   • INFLUENZA VACCINE  08/01/2022   • LIPID PANEL  10/24/2022   • HEMOGLOBIN A1C  02/26/2023              Assessment & Plan   CMS Preventative Services Quick Reference  Risk Factors Identified During Encounter  Cardiovascular Disease: follows with cardiology  Fall Risk-High or Moderate: recently completed physical therapy , follows with physical med  Hearing Problem: wearing hearing aids  Immunizations Discussed/Encouraged (specific Immunizations; Influenza, Prevnar 20 (Pneumococcal 20-valent conjugate) and COVID19  The above risks/problems have been discussed with the patient.  Follow up actions/plans if indicated are seen below in the Assessment/Plan Section.  Pertinent information has been shared with the patient in the After Visit Summary.    Diagnoses and all orders for this visit:    1. Need for immunization against  influenza (Primary)  -     Fluzone High-Dose 65+yrs    2. Need for pneumococcal vaccine  -     Pneumococcal Conjugate Vaccine 20-Valent All        Follow Up:   Return in about 2 months (around 12/6/2022).     An After Visit Summary and PPPS were made available to the patient.

## 2022-10-07 ENCOUNTER — ANTICOAGULATION VISIT (OUTPATIENT)
Dept: PHARMACY | Facility: HOSPITAL | Age: 87
End: 2022-10-07

## 2022-10-07 ENCOUNTER — APPOINTMENT (OUTPATIENT)
Dept: PHYSICAL THERAPY | Facility: HOSPITAL | Age: 87
End: 2022-10-07

## 2022-10-07 DIAGNOSIS — Z95.4 HISTORY OF AORTIC VALVE REPLACEMENT WITH METALLIC VALVE: Primary | ICD-10-CM

## 2022-10-07 DIAGNOSIS — I63.411 CEREBROVASCULAR ACCIDENT (CVA) DUE TO EMBOLISM OF RIGHT MIDDLE CEREBRAL ARTERY: ICD-10-CM

## 2022-10-07 LAB — INR PPP: 3.5

## 2022-10-07 NOTE — PROGRESS NOTES
Anticoagulation Clinic Progress Note    Anticoagulation Summary  As of 10/7/2022    INR goal:  3.0-3.5   TTR:  68.9 % (3.7 y)   INR used for dosing:  3.50 (10/7/2022)   Warfarin maintenance plan:  5 mg every Thu; 7.5 mg all other days   Weekly warfarin total:  50 mg   No change documented:  Natty Sullivan   Plan last modified:  Michelle Piña RPH (7/8/2022)   Next INR check:  10/14/2022   Priority:  High   Target end date:  Indefinite    Indications    History of aortic valve replacement with metallic valve [Z95.4]  Atrial fibrillation (HCC) [I48.91]  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]             Anticoagulation Episode Summary     INR check location:      Preferred lab:      Send INR reminders to:  [facility]  POOL    Comments:  New INR goal 3 - 3.5 (see 1/2020 hospitalization for CVA)      Anticoagulation Care Providers     Provider Role Specialty Phone number    Griffin Fried MD Referring Cardiology [phone]          Clinic Interview:  No pertinent clinical findings have been reported.    INR History:  Anticoagulation Monitoring 9/23/2022 9/30/2022 10/7/2022   INR 3.20 3.50 3.50   INR Date 9/23/2022 9/30/2022 10/7/2022   INR Goal 3.0-3.5 3.0-3.5 3.0-3.5   Trend Same Same Same   Last Week Total 50 mg 50 mg 50 mg   Next Week Total 50 mg 50 mg 50 mg   Sun 7.5 mg 7.5 mg 7.5 mg   Mon 7.5 mg 7.5 mg 7.5 mg   Tue 7.5 mg 7.5 mg 7.5 mg   Wed 7.5 mg 7.5 mg 7.5 mg   Thu 5 mg 5 mg 5 mg   Fri 7.5 mg 7.5 mg 7.5 mg   Sat 7.5 mg 7.5 mg 7.5 mg   Visit Report - - -   Some recent data might be hidden       Plan:  1. INR is therapeutic today- see above in Anticoagulation Summary.    Francisco Sequeira to continue their warfarin regimen- see above in Anticoagulation Summary.  2. Follow up in 1 week  3. Pt has agreed to only be called if INR out of range. They have been instructed to call if any changes in medications, doses, concerns, etc.  Patient expresses understanding and has no further questions at this time.    Natty Sullivan

## 2022-10-10 ENCOUNTER — APPOINTMENT (OUTPATIENT)
Dept: PHYSICAL THERAPY | Facility: HOSPITAL | Age: 87
End: 2022-10-10

## 2022-10-21 ENCOUNTER — ANTICOAGULATION VISIT (OUTPATIENT)
Dept: PHARMACY | Facility: HOSPITAL | Age: 87
End: 2022-10-21

## 2022-10-21 DIAGNOSIS — Z95.4 HISTORY OF AORTIC VALVE REPLACEMENT WITH METALLIC VALVE: Primary | ICD-10-CM

## 2022-10-21 DIAGNOSIS — I63.411 CEREBROVASCULAR ACCIDENT (CVA) DUE TO EMBOLISM OF RIGHT MIDDLE CEREBRAL ARTERY: ICD-10-CM

## 2022-10-21 LAB — INR PPP: 3.5

## 2022-10-21 NOTE — PROGRESS NOTES
Anticoagulation Clinic Progress Note    Anticoagulation Summary  As of 10/21/2022    INR goal:  3.0-3.5   TTR:  69.2 % (3.8 y)   INR used for dosing:  3.50 (10/21/2022)   Warfarin maintenance plan:  5 mg every Thu; 7.5 mg all other days   Weekly warfarin total:  50 mg   No change documented:  Shannon Flor, Pharmacy Technician   Plan last modified:  Michelle Piña RPH (7/8/2022)   Next INR check:  11/4/2022   Priority:  High   Target end date:  Indefinite    Indications    History of aortic valve replacement with metallic valve [Z95.4]  Atrial fibrillation (HCC) [I48.91]  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]             Anticoagulation Episode Summary     INR check location:      Preferred lab:      Send INR reminders to:  [facility]  POOL    Comments:  New INR goal 3 - 3.5 (see 1/2020 hospitalization for CVA)      Anticoagulation Care Providers     Provider Role Specialty Phone number    Griffin Fried MD Referring Cardiology [phone]          Clinic Interview:  No pertinent clinical findings have been reported.    INR History:  Anticoagulation Monitoring 9/30/2022 10/7/2022 10/21/2022   INR 3.50 3.50 3.50   INR Date 9/30/2022 10/7/2022 10/21/2022   INR Goal 3.0-3.5 3.0-3.5 3.0-3.5   Trend Same Same Same   Last Week Total 50 mg 50 mg 50 mg   Next Week Total 50 mg 50 mg 50 mg   Sun 7.5 mg 7.5 mg 7.5 mg   Mon 7.5 mg 7.5 mg 7.5 mg   Tue 7.5 mg 7.5 mg 7.5 mg   Wed 7.5 mg 7.5 mg 7.5 mg   Thu 5 mg 5 mg 5 mg   Fri 7.5 mg 7.5 mg 7.5 mg   Sat 7.5 mg 7.5 mg 7.5 mg   Visit Report - - -   Some recent data might be hidden       Plan:  1. INR is therapeutic today- see above in Anticoagulation Summary.    Francisco Sequeira to continue their warfarin regimen- see above in Anticoagulation Summary.  2. Follow up in 2 weeks  3. Pt has agreed to only be called if INR out of range. They have been instructed to call if any changes in medications, doses,  concerns, etc. Patient expresses understanding and has no further questions at this time.    Shannon Flor, Pharmacy Technician

## 2022-10-28 ENCOUNTER — ANTICOAGULATION VISIT (OUTPATIENT)
Dept: PHARMACY | Facility: HOSPITAL | Age: 87
End: 2022-10-28

## 2022-10-28 DIAGNOSIS — I63.411 CEREBROVASCULAR ACCIDENT (CVA) DUE TO EMBOLISM OF RIGHT MIDDLE CEREBRAL ARTERY: ICD-10-CM

## 2022-10-28 DIAGNOSIS — Z95.4 HISTORY OF AORTIC VALVE REPLACEMENT WITH METALLIC VALVE: Primary | ICD-10-CM

## 2022-10-28 LAB — INR PPP: 3.3

## 2022-10-28 NOTE — PROGRESS NOTES
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    INR History:  Anticoagulation Monitoring 10/7/2022 10/21/2022 10/28/2022   INR 3.50 3.50 3.30   INR Date 10/7/2022 10/21/2022 10/28/2022   INR Goal 3.0-3.5 3.0-3.5 3.0-3.5   Trend Same Same Same   Last Week Total 50 mg 50 mg 50 mg   Next Week Total 50 mg 50 mg 50 mg   Sun 7.5 mg 7.5 mg 7.5 mg   Mon 7.5 mg 7.5 mg 7.5 mg   Tue 7.5 mg 7.5 mg 7.5 mg   Wed 7.5 mg 7.5 mg 7.5 mg   Thu 5 mg 5 mg 5 mg   Fri 7.5 mg 7.5 mg 7.5 mg   Sat 7.5 mg 7.5 mg 7.5 mg   Visit Report - - -   Some recent data might be hidden       Plan:  1. INR is therapeutic today- see above in Anticoagulation Summary.    Francisco Sequeira to continue their warfarin regimen- see above in Anticoagulation Summary.  2. Follow up in 1 week  3. Pt has agreed to only be called if INR out of range. They have been instructed to call if any changes in medications, doses, concerns,  etc. Patient expresses understanding and has no further questions at this time.    Natty Sullivan

## 2022-10-31 RX ORDER — ATORVASTATIN CALCIUM 40 MG/1
TABLET, FILM COATED ORAL
Qty: 90 TABLET | Refills: 1 | Status: SHIPPED | OUTPATIENT
Start: 2022-10-31

## 2022-11-04 ENCOUNTER — ANTICOAGULATION VISIT (OUTPATIENT)
Dept: PHARMACY | Facility: HOSPITAL | Age: 87
End: 2022-11-04

## 2022-11-04 DIAGNOSIS — Z95.4 HISTORY OF AORTIC VALVE REPLACEMENT WITH METALLIC VALVE: Primary | ICD-10-CM

## 2022-11-04 DIAGNOSIS — I63.411 CEREBROVASCULAR ACCIDENT (CVA) DUE TO EMBOLISM OF RIGHT MIDDLE CEREBRAL ARTERY: ICD-10-CM

## 2022-11-04 DIAGNOSIS — I48.21 PERMANENT ATRIAL FIBRILLATION: ICD-10-CM

## 2022-11-04 LAB — INR PPP: 3.2

## 2022-11-04 NOTE — PROGRESS NOTES
Anticoagulation Clinic Progress Note    Anticoagulation Summary  As of 11/4/2022    INR goal:  3.0-3.5   TTR:  69.6 % (3.8 y)   INR used for dosing:  3.20 (11/4/2022)   Warfarin maintenance plan:  5 mg every Thu; 7.5 mg all other days   Weekly warfarin total:  50 mg   No change documented:  Natty Sullivan   Plan last modified:  Michelle Piña RPH (7/8/2022)   Next INR check:  11/11/2022   Priority:  High   Target end date:  Indefinite    Indications    History of aortic valve replacement with metallic valve [Z95.4]  Atrial fibrillation (HCC) [I48.91]  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]             Anticoagulation Episode Summary     INR check location:      Preferred lab:      Send INR reminders to:  [facility]  POOL    Comments:  New INR goal 3 - 3.5 (see 1/2020 hospitalization for CVA)      Anticoagulation Care Providers     Provider Role Specialty Phone number    Griffin Fried MD Referring Cardiology [phone]          Clinic Interview:  No pertinent clinical findings have been reported.    INR History:  Anticoagulation Monitoring 10/21/2022 10/28/2022 11/4/2022   INR 3.50 3.30 3.20   INR Date 10/21/2022 10/28/2022 11/4/2022   INR Goal 3.0-3.5 3.0-3.5 3.0-3.5   Trend Same Same Same   Last Week Total 50 mg 50 mg 50 mg   Next Week Total 50 mg 50 mg 50 mg   Sun 7.5 mg 7.5 mg 7.5 mg   Mon 7.5 mg 7.5 mg 7.5 mg   Tue 7.5 mg 7.5 mg 7.5 mg   Wed 7.5 mg 7.5 mg 7.5 mg   Thu 5 mg 5 mg 5 mg   Fri 7.5 mg 7.5 mg 7.5 mg   Sat 7.5 mg 7.5 mg 7.5 mg   Visit Report - - -   Some recent data might be hidden       Plan:  1. INR is therapeutic today- see above in Anticoagulation Summary.    Francisco Sequeira to continue their warfarin regimen- see above in Anticoagulation Summary.  2. Follow up in 1 week  3. Pt has agreed to only be called if INR out of range. They have been instructed to call if any changes in medications, doses, concerns,  etc. Patient expresses understanding and has no further questions at this time.    Natty Sullivan

## 2022-11-11 ENCOUNTER — ANTICOAGULATION VISIT (OUTPATIENT)
Dept: PHARMACY | Facility: HOSPITAL | Age: 87
End: 2022-11-11

## 2022-11-11 DIAGNOSIS — I63.411 CEREBROVASCULAR ACCIDENT (CVA) DUE TO EMBOLISM OF RIGHT MIDDLE CEREBRAL ARTERY: ICD-10-CM

## 2022-11-11 DIAGNOSIS — Z95.4 HISTORY OF AORTIC VALVE REPLACEMENT WITH METALLIC VALVE: Primary | ICD-10-CM

## 2022-11-11 DIAGNOSIS — I48.21 PERMANENT ATRIAL FIBRILLATION: ICD-10-CM

## 2022-11-11 LAB — INR PPP: 3.3

## 2022-11-11 NOTE — PROGRESS NOTES
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    INR History:  Anticoagulation Monitoring 10/28/2022 11/4/2022 11/11/2022   INR 3.30 3.20 3.30   INR Date 10/28/2022 11/4/2022 11/11/2022   INR Goal 3.0-3.5 3.0-3.5 3.0-3.5   Trend Same Same Same   Last Week Total 50 mg 50 mg 50 mg   Next Week Total 50 mg 50 mg 50 mg   Sun 7.5 mg 7.5 mg 7.5 mg   Mon 7.5 mg 7.5 mg 7.5 mg   Tue 7.5 mg 7.5 mg 7.5 mg   Wed 7.5 mg 7.5 mg 7.5 mg   Thu 5 mg 5 mg 5 mg   Fri 7.5 mg 7.5 mg 7.5 mg   Sat 7.5 mg 7.5 mg 7.5 mg   Visit Report - - -   Some recent data might be hidden       Plan:  1. INR is therapeutic today- see above in Anticoagulation Summary.    Francisco Sequeira to continue their warfarin regimen- see above in Anticoagulation Summary.  2. Follow up in 1 week  3. Pt has agreed to only be called if INR out of range. They have been instructed to call if any changes in  medications, doses, concerns, etc. Patient expresses understanding and has no further questions at this time.    Natty Sullivan

## 2022-11-13 DIAGNOSIS — E11.65 UNCONTROLLED TYPE 2 DIABETES MELLITUS WITH HYPERGLYCEMIA: ICD-10-CM

## 2022-11-15 RX ORDER — INSULIN LISPRO 100 [IU]/ML
INJECTION, SOLUTION INTRAVENOUS; SUBCUTANEOUS
Qty: 30 ML | Refills: 6 | Status: SHIPPED | OUTPATIENT
Start: 2022-11-15 | End: 2023-01-30

## 2022-11-18 ENCOUNTER — ANTICOAGULATION VISIT (OUTPATIENT)
Dept: PHARMACY | Facility: HOSPITAL | Age: 87
End: 2022-11-18

## 2022-11-18 DIAGNOSIS — I63.411 CEREBROVASCULAR ACCIDENT (CVA) DUE TO EMBOLISM OF RIGHT MIDDLE CEREBRAL ARTERY: ICD-10-CM

## 2022-11-18 DIAGNOSIS — Z95.4 HISTORY OF AORTIC VALVE REPLACEMENT WITH METALLIC VALVE: Primary | ICD-10-CM

## 2022-11-18 LAB — INR PPP: 3.1

## 2022-11-18 NOTE — PROGRESS NOTES
Anticoagulation Clinic Progress Note    Anticoagulation Summary  As of 11/18/2022    INR goal:  3.0-3.5   TTR:  69.8 % (3.9 y)   INR used for dosing:  3.10 (11/18/2022)   Warfarin maintenance plan:  5 mg every Thu; 7.5 mg all other days; Starting 11/18/2022   Weekly warfarin total:  50 mg   No change documented:  Natty Sullivan   Plan last modified:  Michelle Piña RPH (7/8/2022)   Next INR check:  11/25/2022   Priority:  High   Target end date:  Indefinite    Indications    History of aortic valve replacement with metallic valve [Z95.4]  Atrial fibrillation (HCC) [I48.91]  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]             Anticoagulation Episode Summary     INR check location:      Preferred lab:      Send INR reminders to:   [facility]  POOL    Comments:  New INR goal 3 - 3.5 (see 1/2020 hospitalization for CVA)      Anticoagulation Care Providers     Provider Role Specialty Phone number    Griffin Fried MD Referring Cardiology [phone]          Clinic Interview:  No pertinent clinical findings have been reported.    INR History:  Anticoagulation Monitoring 11/4/2022 11/11/2022 11/18/2022   INR 3.20 3.30 3.10   INR Date 11/4/2022 11/11/2022 11/18/2022   INR Goal 3.0-3.5 3.0-3.5 3.0-3.5   Trend Same Same Same   Last Week Total 50 mg 50 mg 50 mg   Next Week Total 50 mg 50 mg 50 mg   Sun 7.5 mg 7.5 mg 7.5 mg   Mon 7.5 mg 7.5 mg 7.5 mg   Tue 7.5 mg 7.5 mg 7.5 mg   Wed 7.5 mg 7.5 mg 7.5 mg   Thu 5 mg 5 mg 5 mg   Fri 7.5 mg 7.5 mg 7.5 mg   Sat 7.5 mg 7.5 mg 7.5 mg   Visit Report - - -   Some recent data might be hidden       Plan:  1. INR is therapeutic today- see above in Anticoagulation Summary.    Francisco Sequeira to continue their warfarin regimen- see above in Anticoagulation Summary.  2. Follow up in 1 week  3. Pt has agreed to only be called if INR out of range. They have been instructed to call if any changes in  medications, doses, concerns, etc. Patient expresses understanding and has no further questions at this time.    Natty Sullivan

## 2022-11-25 LAB — INR PPP: 3.1

## 2022-11-28 ENCOUNTER — ANTICOAGULATION VISIT (OUTPATIENT)
Dept: PHARMACY | Facility: HOSPITAL | Age: 87
End: 2022-11-28

## 2022-11-28 DIAGNOSIS — I63.411 CEREBROVASCULAR ACCIDENT (CVA) DUE TO EMBOLISM OF RIGHT MIDDLE CEREBRAL ARTERY: ICD-10-CM

## 2022-11-28 DIAGNOSIS — Z95.4 HISTORY OF AORTIC VALVE REPLACEMENT WITH METALLIC VALVE: Primary | ICD-10-CM

## 2022-11-28 NOTE — PROGRESS NOTES
Anticoagulation Clinic Progress Note    Anticoagulation Summary  As of 11/28/2022    INR goal:  3.0-3.5   TTR:  70.0 % (3.9 y)   INR used for dosing:  3.10 (11/25/2022)   Warfarin maintenance plan:  5 mg every Thu; 7.5 mg all other days; Starting 11/28/2022   Weekly warfarin total:  50 mg   No change documented:  Michelle Piña RPH   Plan last modified:  Michelle Piña RPH (7/8/2022)   Next INR check:  12/2/2022   Priority:  High   Target end date:  Indefinite    Indications    History of aortic valve replacement with metallic valve [Z95.4]  Atrial fibrillation (HCC) [I48.91]  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]             Anticoagulation Episode Summary     INR check location:      Preferred lab:      Send INR reminders to:   [facility]  POOL    Comments:  New INR goal 3 - 3.5 (see 1/2020 hospitalization for CVA)      Anticoagulation Care Providers     Provider Role Specialty Phone number    Griffin Fried MD Referring Cardiology [phone]          Clinic Interview:  No pertinent clinical findings have been reported.    INR History:  Anticoagulation Monitoring 11/11/2022 11/18/2022 11/28/2022   INR 3.30 3.10 3.10   INR Date 11/11/2022 11/18/2022 11/25/2022   INR Goal 3.0-3.5 3.0-3.5 3.0-3.5   Trend Same Same Same   Last Week Total 50 mg 50 mg 50 mg   Next Week Total 50 mg 50 mg 50 mg   Sun 7.5 mg 7.5 mg -   Mon 7.5 mg 7.5 mg 7.5 mg   Tue 7.5 mg 7.5 mg 7.5 mg   Wed 7.5 mg 7.5 mg 7.5 mg   Thu 5 mg 5 mg 5 mg   Fri 7.5 mg 7.5 mg -   Sat 7.5 mg 7.5 mg -   Visit Report - - -   Some recent data might be hidden       Plan:  1. INR is therapeutic today- see above in Anticoagulation Summary.    Francisco Sequeira to continue their warfarin regimen- see above in Anticoagulation Summary.  2. Follow up in 1 week  3. Pt has agreed to only be called if INR out of range. They have been instructed to call if any changes in medications, doses,  concerns, etc. Patient expresses understanding and has no further questions at this time.    Michelle Piña, [facility]

## 2022-12-02 ENCOUNTER — ANTICOAGULATION VISIT (OUTPATIENT)
Dept: PHARMACY | Facility: HOSPITAL | Age: 87
End: 2022-12-02

## 2022-12-02 DIAGNOSIS — I63.411 CEREBROVASCULAR ACCIDENT (CVA) DUE TO EMBOLISM OF RIGHT MIDDLE CEREBRAL ARTERY: ICD-10-CM

## 2022-12-02 DIAGNOSIS — Z95.4 HISTORY OF AORTIC VALVE REPLACEMENT WITH METALLIC VALVE: Primary | ICD-10-CM

## 2022-12-02 LAB — INR PPP: 3.2

## 2022-12-02 NOTE — PROGRESS NOTES
Anticoagulation Clinic Progress Note    Anticoagulation Summary  As of 12/2/2022    INR goal:  3.0-3.5   TTR:  70.1 % (3.9 y)   INR used for dosing:  3.20 (12/2/2022)   Warfarin maintenance plan:  5 mg every Thu; 7.5 mg all other days; Starting 12/2/2022   Weekly warfarin total:  50 mg   No change documented:  Michelle Piña RPH   Plan last modified:  Michelle Piña RPH (7/8/2022)   Next INR check:  12/9/2022   Priority:  High   Target end date:  Indefinite    Indications    History of aortic valve replacement with metallic valve [Z95.4]  Atrial fibrillation (HCC) [I48.91]  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]             Anticoagulation Episode Summary     INR check location:      Preferred lab:      Send INR reminders to:   [facility]  POOL    Comments:  New INR goal 3 - 3.5 (see 1/2020 hospitalization for CVA)      Anticoagulation Care Providers     Provider Role Specialty Phone number    Griffin Fried MD Referring Cardiology [phone]          Clinic Interview:  No pertinent clinical findings have been reported.    INR History:  Anticoagulation Monitoring 11/18/2022 11/28/2022 12/2/2022   INR 3.10 3.10 3.20   INR Date 11/18/2022 11/25/2022 12/2/2022   INR Goal 3.0-3.5 3.0-3.5 3.0-3.5   Trend Same Same Same   Last Week Total 50 mg 50 mg 50 mg   Next Week Total 50 mg 50 mg 50 mg   Sun 7.5 mg - 7.5 mg   Mon 7.5 mg 7.5 mg 7.5 mg   Tue 7.5 mg 7.5 mg 7.5 mg   Wed 7.5 mg 7.5 mg 7.5 mg   Thu 5 mg 5 mg 5 mg   Fri 7.5 mg - 7.5 mg   Sat 7.5 mg - 7.5 mg   Visit Report - - -   Some recent data might be hidden       Plan:  1. INR is therapeutic today- see above in Anticoagulation Summary.    Francisco Sequeira to continue their warfarin regimen- see above in Anticoagulation Summary.  2. Follow up in 1 week  3. Pt has agreed to only be called if INR out of range. They have been instructed to call if any changes in medications, doses, concerns,  etc. Patient expresses understanding and has no further questions at this time.    Michelle Piña, [facility]

## 2022-12-08 ENCOUNTER — OFFICE VISIT (OUTPATIENT)
Dept: INTERNAL MEDICINE | Facility: CLINIC | Age: 87
End: 2022-12-08

## 2022-12-08 VITALS
HEIGHT: 72 IN | BODY MASS INDEX: 23.3 KG/M2 | DIASTOLIC BLOOD PRESSURE: 70 MMHG | WEIGHT: 172 LBS | HEART RATE: 94 BPM | TEMPERATURE: 97.9 F | SYSTOLIC BLOOD PRESSURE: 130 MMHG | OXYGEN SATURATION: 98 %

## 2022-12-08 DIAGNOSIS — N18.32 TYPE 2 DIABETES MELLITUS WITH STAGE 3B CHRONIC KIDNEY DISEASE, WITH LONG-TERM CURRENT USE OF INSULIN: ICD-10-CM

## 2022-12-08 DIAGNOSIS — E11.22 TYPE 2 DIABETES MELLITUS WITH STAGE 3B CHRONIC KIDNEY DISEASE, WITH LONG-TERM CURRENT USE OF INSULIN: ICD-10-CM

## 2022-12-08 DIAGNOSIS — N18.32 STAGE 3B CHRONIC KIDNEY DISEASE: Primary | ICD-10-CM

## 2022-12-08 DIAGNOSIS — Z79.4 TYPE 2 DIABETES MELLITUS WITH STAGE 3B CHRONIC KIDNEY DISEASE, WITH LONG-TERM CURRENT USE OF INSULIN: ICD-10-CM

## 2022-12-08 PROCEDURE — 99214 OFFICE O/P EST MOD 30 MIN: CPT | Performed by: STUDENT IN AN ORGANIZED HEALTH CARE EDUCATION/TRAINING PROGRAM

## 2022-12-08 NOTE — PROGRESS NOTES
Celestine English D.O.  Internal Medicine  [facility]  [address], [address]  [place] [address]  [phone]      Chief Complaint  Diabetes (With lab review)    SUBJECTIVE    History of Present Illness    Francisco Sequeira is a 87 y.o. male who presents to the office today as an established patient that last saw me on 10/6/2022.   Here today with wife who helps provide history.    Type 2 Diabetes: has the VGO40 system, doesn't follow with endocrinology. If his sugar is in the upper 100s or if he is eating a dessert or pasta he will take a few extra clicks of insulin. They have been checking his sugar in the morning fasting and it varies between 55 and 150, states it varies significantly based upon what he has eaten the night before.  The highest reading he has had in the last 2 months is 206 in the middle of the afternoon after eating. No symptoms of hypoglycemia. Usually takes one to two clicks per day of his VGO40.    Allergies   Allergen Reactions   • Penicillins Swelling   • Other Other (See Comments)     Grass, ragweed   • Percocet [Oxycodone-Acetaminophen] Nausea And Vomiting        Outpatient Medications Marked as Taking for the 12/8/22 encounter (Office Visit) with Celestine English, DO   Medication Sig Dispense Refill   • acetaminophen (TYLENOL) 325 MG tablet Take 2 tablets by mouth Every 6 (Six) Hours As Needed for Mild Pain .     • atorvastatin (LIPITOR) 40 MG tablet TAKE ONE TABLET BY MOUTH DAILY 90 tablet 1   • Digoxin 62.5 MCG tablet Take 62.5 mcg by mouth Daily. 30 tablet    • diphenhydrAMINE (BENADRYL) 25 mg capsule Take 1 capsule by mouth 2 (Two) Times a Day As Needed for Itching, Allergies or Sleep.     • famotidine (PEPCID) 20 MG tablet Take 1 tablet by mouth Daily. 90 tablet 1   • ferrous gluconate (FERGON) 324 MG tablet Take 1 tablet by mouth Every Other Day. 90 tablet 1   • Insulin Disposable Pump (V-Go 40) kit Wear each V-Go for one 24-hour period. At the end of the  "24-hour period, retract the needle by sliding and then pushing the Needle Release Button and remove the V-Go from your body 30 each 2   • insulin lispro (humaLOG) 100 UNIT/ML injection Inject  under the skin into the appropriate area as directed Take As Directed. Use as directed with V-Go Pump--NOT TAKING DUE TO LOW BG LEVELS SINCE HOSPITALIZATION      • Insulin Lispro (humaLOG) 100 UNIT/ML injection USE AS DIRECTED WITH V-GO PUMP 30 mL 6   • magnesium oxide (MAG-OX) 400 MG tablet Take 400 mg by mouth 2 (Two) Times a Day.     • warfarin (COUMADIN) 5 MG tablet Take one tablet by mouth on thurs and take one and one-half tablet by mouth all other days 130 tablet 1        Past Medical History:   Diagnosis Date   • Allergic rhinitis    • Aortic valve insufficiency    • Ascending aortic aneurysm    • Aspiration pneumonia (HCC)    • Atrial fibrillation (HCC)    • Bacteremia    • Calcific aortic stenosis of bicuspid valve    • Cardiac arrest (HCC)    • Cataracts, bilateral    • CKD (chronic kidney disease) stage 3, GFR 30-59 ml/min (HCC)    • Contact dermatitis due to poison ivy    • Elbow fracture    • GERD (gastroesophageal reflux disease)    • GI bleed     2021; s/p Colonoscopy and EGD   • H/O mechanical aortic valve replacement    • Head injury    • History of transfusion    • Hyperlipidemia    • Hypertension    • Kidney carcinoma (HCC)    • 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   Constitutional:       General: He is not in acute distress.     Appearance: He is " normal weight. He is ill-appearing (chronically).   HENT:      Head: Atraumatic.   Eyes:      General: No scleral icterus.  Cardiovascular:      Rate and Rhythm: Normal rate and regular rhythm.      Comments: Mechanical valve click present  Pulmonary:      Effort: Pulmonary effort is normal. No respiratory distress.      Breath sounds: Normal breath sounds. No wheezing.   Musculoskeletal:      Comments: Left knee brace in place.    Skin:     Coloration: Skin is not jaundiced.   Neurological:      Mental Status: He is alert.   Psychiatric:         Mood and Affect: Mood normal.         Behavior: Behavior normal.         Thought Content: Thought content normal.                             ASSESSMENT & PLAN     Diagnoses and all orders for this visit:    1. Stage 3b chronic kidney disease (HCC) (Primary)  2. Type 2 diabetes mellitus with stage 3b chronic kidney disease, with long-term current use of insulin (HCC)  -I reviewed outside labs from 12/2/22 ordered by Dr Dominguez his hematologist; Cr up slightly above baseline at 1.88, Na 142, K 5.1, CO2 23, eGFR 35  -A1c 7.4, improved   -Goal A1c for this patient is less than 7.5 to 8% due to extensive comorbid conditions  -Current diabetes regimen:  has the VGO40 system which delivers 40 Units of insulin (1.67 U/hr) in one 24-hour time period; he is giving himself 1 to 2 extra clicks daily based upon his food intake and has no symptoms of hypoglycemia ; previously was on Tresiba as well   -Summary of patient's most recent blood glucose trends at home:  They have been checking his sugar in the morning fasting and it varies between 55 and 150, states it varies significantly based upon what he has eaten the night before.The highest reading he has had in the last 2 months is 206 in the middle of the afternoon after eating.  -Changes made to diabetes regimen today: he and his wife are very comfortable with the VGO system and are hesitant to switch or try oral medications; I remain  concerned regarding hypoglycemia but at this point he is not experiencing hypoglycemia so I am comfortable continuing current regimen. If he were to begin getting hypoglycemic, I would want to decrease his VGO to 30 or 20 or switch to oral option such as SGLT 2 given his co-existing CKD    -advised pt to contact his nephrologist to make sure he is aware of slight worsening of renal function and to get a follow up appointment         The following social determinates of health impact the patient's medical decision making: No social determinates of health were factored in to today's visit.     Follow Up  Return in about 3 months (around 3/8/2023) for Recheck.    Patient/family had no further questions at this time and verbalized understanding of the plan discussed today.

## 2022-12-09 ENCOUNTER — TELEPHONE (OUTPATIENT)
Dept: INTERNAL MEDICINE | Facility: CLINIC | Age: 87
End: 2022-12-09

## 2022-12-09 ENCOUNTER — ANTICOAGULATION VISIT (OUTPATIENT)
Dept: PHARMACY | Facility: HOSPITAL | Age: 87
End: 2022-12-09

## 2022-12-09 DIAGNOSIS — I63.411 CEREBROVASCULAR ACCIDENT (CVA) DUE TO EMBOLISM OF RIGHT MIDDLE CEREBRAL ARTERY: ICD-10-CM

## 2022-12-09 DIAGNOSIS — Z95.4 HISTORY OF AORTIC VALVE REPLACEMENT WITH METALLIC VALVE: Primary | ICD-10-CM

## 2022-12-09 LAB — INR PPP: 3.1

## 2022-12-09 NOTE — PROGRESS NOTES
Anticoagulation Clinic Progress Note    Anticoagulation Summary  As of 12/9/2022    INR goal:  3.0-3.5   TTR:  70.3 % (3.9 y)   INR used for dosing:  3.10 (12/9/2022)   Warfarin maintenance plan:  5 mg every Thu; 7.5 mg all other days; Starting 12/9/2022   Weekly warfarin total:  50 mg   No change documented:  Wilma Diaz, Pharmacy Technician   Plan last modified:  Michelle Piña RPH (7/8/2022)   Next INR check:  12/16/2022   Priority:  High   Target end date:  Indefinite    Indications    History of aortic valve replacement with metallic valve [Z95.4]  Atrial fibrillation (HCC) [I48.91]  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]             Anticoagulation Episode Summary     INR check location:      Preferred lab:      Send INR reminders to:   [facility]  POOL    Comments:  New INR goal 3 - 3.5 (see 1/2020 hospitalization for CVA)      Anticoagulation Care Providers     Provider Role Specialty Phone number    Griffin Fried MD Referring Cardiology [phone]          Clinic Interview:  No pertinent clinical findings have been reported.    INR History:  Anticoagulation Monitoring 11/28/2022 12/2/2022 12/9/2022   INR 3.10 3.20 3.10   INR Date 11/25/2022 12/2/2022 12/9/2022   INR Goal 3.0-3.5 3.0-3.5 3.0-3.5   Trend Same Same Same   Last Week Total 50 mg 50 mg 50 mg   Next Week Total 50 mg 50 mg 50 mg   Sun - 7.5 mg 7.5 mg   Mon 7.5 mg 7.5 mg 7.5 mg   Tue 7.5 mg 7.5 mg 7.5 mg   Wed 7.5 mg 7.5 mg 7.5 mg   Thu 5 mg 5 mg 5 mg   Fri - 7.5 mg 7.5 mg   Sat - 7.5 mg 7.5 mg   Visit Report - - -   Some recent data might be hidden       Plan:  1. INR is therapeutic today- see above in Anticoagulation Summary.    Francisco Sequeira to continue their warfarin regimen- see above in Anticoagulation Summary.  2. Follow up in 1 week  3. Pt has agreed to only be called if INR out of range. They have been instructed to call if any changes in medications,  doses, concerns, etc. Patient expresses understanding and has no further questions at this time.    Wilma Diaz, Pharmacy Technician

## 2022-12-09 NOTE — TELEPHONE ENCOUNTER
----- Message from Celestine English DO sent at 12/8/2022  5:08 PM EST -----  Please let pt and wife know I have reviewed his labs. A1c is improved to 7.4 . Continue current VGO40 and maximum of 1-2 clicks daily. Renal function is slightly worse than before, I recommend reaching out to his nephrologist for a follow up appointment. Overall these labs were ordered by Dr Dominguez so they need to meet with him to discuss all results , specifically the kidney results.

## 2022-12-13 RX ORDER — DOXYCYCLINE HYCLATE 50 MG/1
CAPSULE, GELATIN COATED ORAL
Qty: 45 TABLET | Refills: 0 | Status: SHIPPED | OUTPATIENT
Start: 2022-12-13

## 2022-12-16 ENCOUNTER — ANTICOAGULATION VISIT (OUTPATIENT)
Dept: PHARMACY | Facility: HOSPITAL | Age: 87
End: 2022-12-16

## 2022-12-16 DIAGNOSIS — I63.411 CEREBROVASCULAR ACCIDENT (CVA) DUE TO EMBOLISM OF RIGHT MIDDLE CEREBRAL ARTERY: ICD-10-CM

## 2022-12-16 DIAGNOSIS — Z95.4 HISTORY OF AORTIC VALVE REPLACEMENT WITH METALLIC VALVE: Primary | ICD-10-CM

## 2022-12-16 LAB — INR PPP: 3.3

## 2022-12-16 NOTE — PROGRESS NOTES
Anticoagulation Clinic Progress Note    Anticoagulation Summary  As of 12/16/2022    INR goal:  3.0-3.5   TTR:  70.4 % (3.9 y)   INR used for dosing:  3.30 (12/16/2022)   Warfarin maintenance plan:  5 mg every Thu; 7.5 mg all other days; Starting 12/16/2022   Weekly warfarin total:  50 mg   No change documented:  Wilma Diaz, Pharmacy Technician   Plan last modified:  Michelle Piña RPH (7/8/2022)   Next INR check:  12/23/2022   Priority:  High   Target end date:  Indefinite    Indications    History of aortic valve replacement with metallic valve [Z95.4]  Atrial fibrillation (HCC) [I48.91]  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]             Anticoagulation Episode Summary     INR check location:      Preferred lab:      Send INR reminders to:   [facility]  POOL    Comments:  New INR goal 3 - 3.5 (see 1/2020 hospitalization for CVA)      Anticoagulation Care Providers     Provider Role Specialty Phone number    Griffin Fried MD Referring Cardiology [phone]          Clinic Interview:  No pertinent clinical findings have been reported.    INR History:  Anticoagulation Monitoring 12/2/2022 12/9/2022 12/16/2022   INR 3.20 3.10 3.30   INR Date 12/2/2022 12/9/2022 12/16/2022   INR Goal 3.0-3.5 3.0-3.5 3.0-3.5   Trend Same Same Same   Last Week Total 50 mg 50 mg 50 mg   Next Week Total 50 mg 50 mg 50 mg   Sun 7.5 mg 7.5 mg 7.5 mg   Mon 7.5 mg 7.5 mg 7.5 mg   Tue 7.5 mg 7.5 mg 7.5 mg   Wed 7.5 mg 7.5 mg 7.5 mg   Thu 5 mg 5 mg 5 mg   Fri 7.5 mg 7.5 mg 7.5 mg   Sat 7.5 mg 7.5 mg 7.5 mg   Visit Report - - -   Some recent data might be hidden       Plan:  1. INR is therapeutic today- see above in Anticoagulation Summary.    Francisco Sequeira to continue their warfarin regimen- see above in Anticoagulation Summary.  2. Follow up in 1 week  3. Pt has agreed to only be called if INR out of range. They have been instructed to call if any changes in  medications, doses, concerns, etc. Patient expresses understanding and has no further questions at this time.    Wilma Diaz, Pharmacy Technician

## 2022-12-21 DIAGNOSIS — E11.69 TYPE 2 DIABETES MELLITUS WITH OTHER SPECIFIED COMPLICATION, UNSPECIFIED WHETHER LONG TERM INSULIN USE: ICD-10-CM

## 2022-12-21 RX ORDER — FLASH GLUCOSE SENSOR
KIT MISCELLANEOUS
OUTPATIENT
Start: 2022-12-21

## 2022-12-23 ENCOUNTER — ANTICOAGULATION VISIT (OUTPATIENT)
Dept: PHARMACY | Facility: HOSPITAL | Age: 87
End: 2022-12-23

## 2022-12-23 DIAGNOSIS — I63.411 CEREBROVASCULAR ACCIDENT (CVA) DUE TO EMBOLISM OF RIGHT MIDDLE CEREBRAL ARTERY: ICD-10-CM

## 2022-12-23 DIAGNOSIS — Z95.4 HISTORY OF AORTIC VALVE REPLACEMENT WITH METALLIC VALVE: Primary | ICD-10-CM

## 2022-12-23 LAB — INR PPP: 3.3

## 2022-12-23 NOTE — PROGRESS NOTES
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    INR History:  Anticoagulation Monitoring 12/9/2022 12/16/2022 12/23/2022   INR 3.10 3.30 3.30   INR Date 12/9/2022 12/16/2022 12/23/2022   INR Goal 3.0-3.5 3.0-3.5 3.0-3.5   Trend Same Same Same   Last Week Total 50 mg 50 mg 50 mg   Next Week Total 50 mg 50 mg 50 mg   Sun 7.5 mg 7.5 mg 7.5 mg   Mon 7.5 mg 7.5 mg 7.5 mg   Tue 7.5 mg 7.5 mg 7.5 mg   Wed 7.5 mg 7.5 mg 7.5 mg   Thu 5 mg 5 mg 5 mg   Fri 7.5 mg 7.5 mg 7.5 mg   Sat 7.5 mg 7.5 mg 7.5 mg   Visit Report - - -   Some recent data might be hidden       Plan:  1. INR is therapeutic today- see above in Anticoagulation Summary.    Francisco Sequeira to continue their warfarin regimen- see above in Anticoagulation Summary.  2. Follow up in 1 week  3. Pt has agreed to only be called if INR out of range. They have been instructed to call if any changes in medications,  doses, concerns, etc. Patient expresses understanding and has no further questions at this time.    Michelle Piña, [facility]

## 2022-12-27 ENCOUNTER — TELEPHONE (OUTPATIENT)
Dept: INTERNAL MEDICINE | Facility: CLINIC | Age: 87
End: 2022-12-27

## 2022-12-27 NOTE — TELEPHONE ENCOUNTER
Pts wife calling regarding pts Insulin Disposable Pump (V-Go 40)    Pt states it needs to be sent over to the [facility] on [address] [facility].

## 2022-12-28 DIAGNOSIS — E11.22 TYPE 2 DIABETES MELLITUS WITH STAGE 3B CHRONIC KIDNEY DISEASE, WITH LONG-TERM CURRENT USE OF INSULIN: ICD-10-CM

## 2022-12-28 DIAGNOSIS — Z79.4 TYPE 2 DIABETES MELLITUS WITH STAGE 3B CHRONIC KIDNEY DISEASE, WITH LONG-TERM CURRENT USE OF INSULIN: Primary | ICD-10-CM

## 2022-12-28 DIAGNOSIS — Z79.4 TYPE 2 DIABETES MELLITUS WITH STAGE 3B CHRONIC KIDNEY DISEASE, WITH LONG-TERM CURRENT USE OF INSULIN: ICD-10-CM

## 2022-12-28 DIAGNOSIS — N18.32 TYPE 2 DIABETES MELLITUS WITH STAGE 3B CHRONIC KIDNEY DISEASE, WITH LONG-TERM CURRENT USE OF INSULIN: Primary | ICD-10-CM

## 2022-12-28 DIAGNOSIS — E11.22 TYPE 2 DIABETES MELLITUS WITH STAGE 3B CHRONIC KIDNEY DISEASE, WITH LONG-TERM CURRENT USE OF INSULIN: Primary | ICD-10-CM

## 2022-12-28 DIAGNOSIS — N18.32 TYPE 2 DIABETES MELLITUS WITH STAGE 3B CHRONIC KIDNEY DISEASE, WITH LONG-TERM CURRENT USE OF INSULIN: ICD-10-CM

## 2022-12-28 RX ORDER — SUB-Q INSULIN DEVICE, 40 UNIT
EACH MISCELLANEOUS
Qty: 30 EACH | Refills: 2 | Status: SHIPPED | OUTPATIENT
Start: 2022-12-28

## 2022-12-28 RX ORDER — SUB-Q INSULIN DEVICE, 40 UNIT
EACH MISCELLANEOUS
Qty: 30 EACH | Refills: 2 | Status: SHIPPED | OUTPATIENT
Start: 2022-12-28 | End: 2022-12-28 | Stop reason: SDUPTHER

## 2022-12-29 DIAGNOSIS — E11.69 TYPE 2 DIABETES MELLITUS WITH OTHER SPECIFIED COMPLICATION, UNSPECIFIED WHETHER LONG TERM INSULIN USE: ICD-10-CM

## 2022-12-29 RX ORDER — FLASH GLUCOSE SENSOR
1 KIT MISCELLANEOUS
Qty: 6 EACH | Refills: 3 | Status: SHIPPED | OUTPATIENT
Start: 2022-12-29 | End: 2023-01-30

## 2022-12-29 NOTE — TELEPHONE ENCOUNTER
Caller: Gladys Sequeira    Relationship: Emergency Contact    Best call back number: [phone]    Requested Prescriptions:   Requested Prescriptions     Pending Prescriptions Disp Refills   • Continuous Blood Gluc Sensor (FreeStyle Vance 14 Day Sensor) misc 6 each 3     Si application Every 14 (Fourteen) Days.        Pharmacy where request should be sent: [facility] [id number] [place] [id number] [facility] [phone] [facility] [phone] FX     Does the patient have less than a 3 day supply:  [] Yes  [x] No    Would you like a call back once the refill request has been completed: [] Yes [x] No      Felicia Alarcon Rep   22 09:59 EST          Final Anesthesia Post-op Assessment    Patient: Justo Petersen  Procedure(s) Performed: EGD, WITH ABLATION  Anesthesia type: General    Vitals Value Taken Time   Temp 36.6 °C (97.9 °F) 03/01/21 1055   Pulse 74 03/01/21 1140   Resp 14 03/01/21 1125   SpO2 95 % 03/01/21 1140   /86 03/01/21 1140         Patient Location: Phase II  Post-op Vital Signs:stable  Level of Consciousness: participates in exam  Respiratory Status: spontaneous ventilation  Cardiovascular stable  Hydration: euvolemic  Pain Management: well controlled  Handoff: Handoff to receiving clinician was performed and questions were answered  Vomiting: none   Nausea: None  Airway Patency:patent  Post-op Assessment: awake, alert, appropriately conversant, or baseline and no complications      No complications documented.

## 2022-12-30 ENCOUNTER — ANTICOAGULATION VISIT (OUTPATIENT)
Dept: PHARMACY | Facility: HOSPITAL | Age: 87
End: 2022-12-30

## 2022-12-30 DIAGNOSIS — Z95.4 HISTORY OF AORTIC VALVE REPLACEMENT WITH METALLIC VALVE: Primary | ICD-10-CM

## 2022-12-30 DIAGNOSIS — I63.411 CEREBROVASCULAR ACCIDENT (CVA) DUE TO EMBOLISM OF RIGHT MIDDLE CEREBRAL ARTERY: ICD-10-CM

## 2022-12-30 LAB — INR PPP: 3.5

## 2022-12-30 NOTE — PROGRESS NOTES
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    INR History:  Anticoagulation Monitoring 12/16/2022 12/23/2022 12/30/2022   INR 3.30 3.30 3.50   INR Date 12/16/2022 12/23/2022 12/30/2022   INR Goal 3.0-3.5 3.0-3.5 3.0-3.5   Trend Same Same Same   Last Week Total 50 mg 50 mg 50 mg   Next Week Total 50 mg 50 mg 50 mg   Sun 7.5 mg 7.5 mg 7.5 mg   Mon 7.5 mg 7.5 mg 7.5 mg   Tue 7.5 mg 7.5 mg 7.5 mg   Wed 7.5 mg 7.5 mg 7.5 mg   Thu 5 mg 5 mg 5 mg   Fri 7.5 mg 7.5 mg 7.5 mg   Sat 7.5 mg 7.5 mg 7.5 mg   Visit Report - - -   Some recent data might be hidden       Plan:  1. INR is therapeutic today- see above in Anticoagulation Summary.    Francisco Sequeira to continue their warfarin regimen- see above in Anticoagulation Summary.  2. Follow up in 1 week  3. Pt has agreed to only be called if INR out of range. They have been instructed to call if any changes in  medications, doses, concerns, etc. Patient expresses understanding and has no further questions at this time.    Wilma Diaz, Pharmacy Technician

## 2023-01-06 ENCOUNTER — ANTICOAGULATION VISIT (OUTPATIENT)
Dept: PHARMACY | Facility: HOSPITAL | Age: 88
End: 2023-01-06
Payer: MEDICARE

## 2023-01-06 DIAGNOSIS — Z95.4 HISTORY OF AORTIC VALVE REPLACEMENT WITH METALLIC VALVE: Primary | ICD-10-CM

## 2023-01-06 DIAGNOSIS — I63.411 CEREBROVASCULAR ACCIDENT (CVA) DUE TO EMBOLISM OF RIGHT MIDDLE CEREBRAL ARTERY: ICD-10-CM

## 2023-01-06 LAB — INR PPP: 3.1

## 2023-01-06 NOTE — PROGRESS NOTES
Anticoagulation Clinic Progress Note    Anticoagulation Summary  As of 1/6/2023    INR goal:  3.0-3.5   TTR:  70.9 % (4 y)   INR used for dosing:  3.10 (1/6/2023)   Warfarin maintenance plan:  5 mg every Thu; 7.5 mg all other days; Starting 1/6/2023   Weekly warfarin total:  50 mg   No change documented:  Wilma Diaz, Pharmacy Technician   Plan last modified:  Michelle Piña RPH (7/8/2022)   Next INR check:  1/13/2023   Priority:  High   Target end date:  Indefinite    Indications    History of aortic valve replacement with metallic valve [Z95.4]  Atrial fibrillation (HCC) [I48.91]  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    INR History:  Anticoagulation Monitoring 12/23/2022 12/30/2022 1/6/2023   INR 3.30 3.50 3.10   INR Date 12/23/2022 12/30/2022 1/6/2023   INR Goal 3.0-3.5 3.0-3.5 3.0-3.5   Trend Same Same Same   Last Week Total 50 mg 50 mg 50 mg   Next Week Total 50 mg 50 mg 50 mg   Sun 7.5 mg 7.5 mg 7.5 mg   Mon 7.5 mg 7.5 mg 7.5 mg   Tue 7.5 mg 7.5 mg 7.5 mg   Wed 7.5 mg 7.5 mg 7.5 mg   Thu 5 mg 5 mg 5 mg   Fri 7.5 mg 7.5 mg 7.5 mg   Sat 7.5 mg 7.5 mg 7.5 mg   Visit Report - - -   Some recent data might be hidden       Plan:  1. INR is therapeutic today- see above in Anticoagulation Summary.    Francisco Sequeira to continue their warfarin regimen- see above in Anticoagulation Summary.  2. Follow up in 1 week  3. Pt has agreed to only be called if INR out of range. They have been instructed to call if any changes in  medications, doses, concerns, etc. Patient expresses understanding and has no further questions at this time.    Wilma Diaz, Pharmacy Technician

## 2023-01-13 ENCOUNTER — ANTICOAGULATION VISIT (OUTPATIENT)
Dept: PHARMACY | Facility: HOSPITAL | Age: 88
End: 2023-01-13
Payer: MEDICARE

## 2023-01-13 DIAGNOSIS — I63.411 CEREBROVASCULAR ACCIDENT (CVA) DUE TO EMBOLISM OF RIGHT MIDDLE CEREBRAL ARTERY: ICD-10-CM

## 2023-01-13 DIAGNOSIS — I48.21 PERMANENT ATRIAL FIBRILLATION: ICD-10-CM

## 2023-01-13 DIAGNOSIS — Z95.4 HISTORY OF AORTIC VALVE REPLACEMENT WITH METALLIC VALVE: Primary | ICD-10-CM

## 2023-01-13 LAB — INR PPP: 3.1

## 2023-01-13 NOTE — PROGRESS NOTES
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    INR History:  Anticoagulation Monitoring 12/30/2022 1/6/2023 1/13/2023   INR 3.50 3.10 3.10   INR Date 12/30/2022 1/6/2023 1/13/2023   INR Goal 3.0-3.5 3.0-3.5 3.0-3.5   Trend Same Same Same   Last Week Total 50 mg 50 mg 50 mg   Next Week Total 50 mg 50 mg 50 mg   Sun 7.5 mg 7.5 mg 7.5 mg   Mon 7.5 mg 7.5 mg 7.5 mg   Tue 7.5 mg 7.5 mg 7.5 mg   Wed 7.5 mg 7.5 mg 7.5 mg   Thu 5 mg 5 mg 5 mg   Fri 7.5 mg 7.5 mg 7.5 mg   Sat 7.5 mg 7.5 mg 7.5 mg   Visit Report - - -   Some recent data might be hidden       Plan:  1. INR is therapeutic today- see above in Anticoagulation Summary.    Francisco Sequeira to continue their warfarin regimen- see above in Anticoagulation Summary.  2. Follow up in 1 week  3. Pt has agreed to only be called if INR out of range. They have been instructed to call if any changes in medications,  doses, concerns, etc. Patient expresses understanding and has no further questions at this time.    Natty Sullivan

## 2023-01-20 ENCOUNTER — ANTICOAGULATION VISIT (OUTPATIENT)
Dept: PHARMACY | Facility: HOSPITAL | Age: 88
End: 2023-01-20
Payer: MEDICARE

## 2023-01-20 DIAGNOSIS — I63.411 CEREBROVASCULAR ACCIDENT (CVA) DUE TO EMBOLISM OF RIGHT MIDDLE CEREBRAL ARTERY: ICD-10-CM

## 2023-01-20 DIAGNOSIS — Z95.4 HISTORY OF AORTIC VALVE REPLACEMENT WITH METALLIC VALVE: Primary | ICD-10-CM

## 2023-01-20 LAB — INR PPP: 3.3

## 2023-01-20 NOTE — PROGRESS NOTES
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    INR History:  Anticoagulation Monitoring 1/6/2023 1/13/2023 1/20/2023   INR 3.10 3.10 3.30   INR Date 1/6/2023 1/13/2023 1/20/2023   INR Goal 3.0-3.5 3.0-3.5 3.0-3.5   Trend Same Same Same   Last Week Total 50 mg 50 mg 50 mg   Next Week Total 50 mg 50 mg 50 mg   Sun 7.5 mg 7.5 mg 7.5 mg   Mon 7.5 mg 7.5 mg 7.5 mg   Tue 7.5 mg 7.5 mg 7.5 mg   Wed 7.5 mg 7.5 mg 7.5 mg   Thu 5 mg 5 mg 5 mg   Fri 7.5 mg 7.5 mg 7.5 mg   Sat 7.5 mg 7.5 mg 7.5 mg   Visit Report - - -   Some recent data might be hidden       Plan:  1. INR is therapeutic today- see above in Anticoagulation Summary.    Francisco Sequeira to continue their warfarin regimen- see above in Anticoagulation Summary.  2. Follow up in 1 week  3. Pt has agreed to only be called if INR out of range. They have been instructed to call if any changes in  medications, doses, concerns, etc. Patient expresses understanding and has no further questions at this time.    Wilma Diaz, Pharmacy Technician

## 2023-01-27 ENCOUNTER — ANTICOAGULATION VISIT (OUTPATIENT)
Dept: PHARMACY | Facility: HOSPITAL | Age: 88
End: 2023-01-27
Payer: MEDICARE

## 2023-01-27 DIAGNOSIS — I63.411 CEREBROVASCULAR ACCIDENT (CVA) DUE TO EMBOLISM OF RIGHT MIDDLE CEREBRAL ARTERY: ICD-10-CM

## 2023-01-27 DIAGNOSIS — I48.21 PERMANENT ATRIAL FIBRILLATION: ICD-10-CM

## 2023-01-27 DIAGNOSIS — Z95.4 HISTORY OF AORTIC VALVE REPLACEMENT WITH METALLIC VALVE: Primary | ICD-10-CM

## 2023-01-27 LAB — INR PPP: 3.4

## 2023-01-27 NOTE — PROGRESS NOTES
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    INR History:  Anticoagulation Monitoring 1/13/2023 1/20/2023 1/27/2023   INR 3.10 3.30 3.40   INR Date 1/13/2023 1/20/2023 1/27/2023   INR Goal 3.0-3.5 3.0-3.5 3.0-3.5   Trend Same Same Same   Last Week Total 50 mg 50 mg 50 mg   Next Week Total 50 mg 50 mg 50 mg   Sun 7.5 mg 7.5 mg 7.5 mg   Mon 7.5 mg 7.5 mg 7.5 mg   Tue 7.5 mg 7.5 mg 7.5 mg   Wed 7.5 mg 7.5 mg 7.5 mg   Thu 5 mg 5 mg 5 mg   Fri 7.5 mg 7.5 mg 7.5 mg   Sat 7.5 mg 7.5 mg 7.5 mg   Visit Report - - -   Some recent data might be hidden       Plan:  1. INR is therapeutic today- see above in Anticoagulation Summary.    Francisco Sequeira to continue their warfarin regimen- see above in Anticoagulation Summary.  2. Follow up in 1 week  3. Pt has agreed to only be called if INR out of range. They have been instructed to call if any changes in medications, doses,  concerns, etc. Patient expresses understanding and has no further questions at this time.    Natty Sullivan

## 2023-01-30 ENCOUNTER — APPOINTMENT (OUTPATIENT)
Dept: GENERAL RADIOLOGY | Facility: HOSPITAL | Age: 88
DRG: 871 | End: 2023-01-30
Payer: COMMERCIAL

## 2023-01-30 ENCOUNTER — HOSPITAL ENCOUNTER (INPATIENT)
Facility: HOSPITAL | Age: 88
LOS: 3 days | Discharge: HOME HEALTH | DRG: 871 | End: 2023-02-02
Attending: EMERGENCY MEDICINE | Admitting: HOSPITALIST
Payer: COMMERCIAL

## 2023-01-30 DIAGNOSIS — Z74.09 IMPAIRED MOBILITY AND ENDURANCE: ICD-10-CM

## 2023-01-30 DIAGNOSIS — A41.9 SEPSIS, DUE TO UNSPECIFIED ORGANISM, UNSPECIFIED WHETHER ACUTE ORGAN DYSFUNCTION PRESENT: ICD-10-CM

## 2023-01-30 DIAGNOSIS — N18.9 ACUTE RENAL FAILURE SUPERIMPOSED ON CHRONIC KIDNEY DISEASE, UNSPECIFIED CKD STAGE, UNSPECIFIED ACUTE RENAL FAILURE TYPE: ICD-10-CM

## 2023-01-30 DIAGNOSIS — R77.8 TROPONIN LEVEL ELEVATED: ICD-10-CM

## 2023-01-30 DIAGNOSIS — J69.0 ASPIRATION PNEUMONITIS: ICD-10-CM

## 2023-01-30 DIAGNOSIS — J18.9 PNEUMONIA OF RIGHT LOWER LOBE DUE TO INFECTIOUS ORGANISM: Primary | ICD-10-CM

## 2023-01-30 DIAGNOSIS — E11.65 HYPERGLYCEMIA DUE TO DIABETES MELLITUS: ICD-10-CM

## 2023-01-30 DIAGNOSIS — E11.69 TYPE 2 DIABETES MELLITUS WITH OTHER SPECIFIED COMPLICATION, UNSPECIFIED WHETHER LONG TERM INSULIN USE: ICD-10-CM

## 2023-01-30 DIAGNOSIS — N17.9 ACUTE RENAL FAILURE SUPERIMPOSED ON CHRONIC KIDNEY DISEASE, UNSPECIFIED CKD STAGE, UNSPECIFIED ACUTE RENAL FAILURE TYPE: ICD-10-CM

## 2023-01-30 DIAGNOSIS — R13.12 OROPHARYNGEAL DYSPHAGIA: ICD-10-CM

## 2023-01-30 LAB
ALBUMIN SERPL-MCNC: 3.4 G/DL (ref 3.5–5.2)
ALBUMIN/GLOB SERPL: 0.9 G/DL
ALP SERPL-CCNC: 124 U/L (ref 39–117)
ALT SERPL W P-5'-P-CCNC: 19 U/L (ref 1–41)
ANION GAP SERPL CALCULATED.3IONS-SCNC: 12.4 MMOL/L (ref 5–15)
APTT PPP: 69.5 SECONDS (ref 22.7–35.4)
AST SERPL-CCNC: 23 U/L (ref 1–40)
B PARAPERT DNA SPEC QL NAA+PROBE: NOT DETECTED
B PERT DNA SPEC QL NAA+PROBE: NOT DETECTED
BACTERIA UR QL AUTO: ABNORMAL /HPF
BASOPHILS # BLD AUTO: 0.02 10*3/MM3 (ref 0–0.2)
BASOPHILS NFR BLD AUTO: 0.2 % (ref 0–1.5)
BILIRUB SERPL-MCNC: 0.9 MG/DL (ref 0–1.2)
BILIRUB UR QL STRIP: NEGATIVE
BUN SERPL-MCNC: 34 MG/DL (ref 8–23)
BUN/CREAT SERPL: 17.1 (ref 7–25)
C PNEUM DNA NPH QL NAA+NON-PROBE: NOT DETECTED
CALCIUM SPEC-SCNC: 9 MG/DL (ref 8.6–10.5)
CHLORIDE SERPL-SCNC: 104 MMOL/L (ref 98–107)
CLARITY UR: CLEAR
CO2 SERPL-SCNC: 21.6 MMOL/L (ref 22–29)
COLOR UR: YELLOW
CREAT SERPL-MCNC: 1.99 MG/DL (ref 0.76–1.27)
D-LACTATE SERPL-SCNC: 1.2 MMOL/L (ref 0.5–2)
D-LACTATE SERPL-SCNC: 2.9 MMOL/L (ref 0.5–2)
DEPRECATED RDW RBC AUTO: 44.8 FL (ref 37–54)
DIGOXIN SERPL-MCNC: 0.7 NG/ML (ref 0.6–1.2)
EGFRCR SERPLBLD CKD-EPI 2021: 32.3 ML/MIN/1.73
EOSINOPHIL # BLD AUTO: 0.02 10*3/MM3 (ref 0–0.4)
EOSINOPHIL NFR BLD AUTO: 0.2 % (ref 0.3–6.2)
ERYTHROCYTE [DISTWIDTH] IN BLOOD BY AUTOMATED COUNT: 15.7 % (ref 12.3–15.4)
FLUAV SUBTYP SPEC NAA+PROBE: NOT DETECTED
FLUBV RNA ISLT QL NAA+PROBE: NOT DETECTED
GLOBULIN UR ELPH-MCNC: 3.7 GM/DL
GLUCOSE BLDC GLUCOMTR-MCNC: 146 MG/DL (ref 70–130)
GLUCOSE BLDC GLUCOMTR-MCNC: 151 MG/DL (ref 70–130)
GLUCOSE SERPL-MCNC: 231 MG/DL (ref 65–99)
GLUCOSE UR STRIP-MCNC: NEGATIVE MG/DL
HADV DNA SPEC NAA+PROBE: NOT DETECTED
HCOV 229E RNA SPEC QL NAA+PROBE: NOT DETECTED
HCOV HKU1 RNA SPEC QL NAA+PROBE: NOT DETECTED
HCOV NL63 RNA SPEC QL NAA+PROBE: NOT DETECTED
HCOV OC43 RNA SPEC QL NAA+PROBE: NOT DETECTED
HCT VFR BLD AUTO: 35.3 % (ref 37.5–51)
HGB BLD-MCNC: 10.7 G/DL (ref 13–17.7)
HGB UR QL STRIP.AUTO: ABNORMAL
HMPV RNA NPH QL NAA+NON-PROBE: NOT DETECTED
HOLD SPECIMEN: NORMAL
HPIV1 RNA ISLT QL NAA+PROBE: NOT DETECTED
HPIV2 RNA SPEC QL NAA+PROBE: NOT DETECTED
HPIV3 RNA NPH QL NAA+PROBE: NOT DETECTED
HPIV4 P GENE NPH QL NAA+PROBE: NOT DETECTED
HYALINE CASTS UR QL AUTO: ABNORMAL /LPF
IMM GRANULOCYTES # BLD AUTO: 0.04 10*3/MM3 (ref 0–0.05)
IMM GRANULOCYTES NFR BLD AUTO: 0.4 % (ref 0–0.5)
INR PPP: 3.48 (ref 0.9–1.1)
KETONES UR QL STRIP: NEGATIVE
LEUKOCYTE ESTERASE UR QL STRIP.AUTO: NEGATIVE
LYMPHOCYTES # BLD AUTO: 0.62 10*3/MM3 (ref 0.7–3.1)
LYMPHOCYTES NFR BLD AUTO: 5.7 % (ref 19.6–45.3)
M PNEUMO IGG SER IA-ACNC: NOT DETECTED
MCH RBC QN AUTO: 24.1 PG (ref 26.6–33)
MCHC RBC AUTO-ENTMCNC: 30.3 G/DL (ref 31.5–35.7)
MCV RBC AUTO: 79.5 FL (ref 79–97)
MONOCYTES # BLD AUTO: 0.92 10*3/MM3 (ref 0.1–0.9)
MONOCYTES NFR BLD AUTO: 8.4 % (ref 5–12)
NEUTROPHILS NFR BLD AUTO: 85.1 % (ref 42.7–76)
NEUTROPHILS NFR BLD AUTO: 9.34 10*3/MM3 (ref 1.7–7)
NITRITE UR QL STRIP: NEGATIVE
NRBC BLD AUTO-RTO: 0 /100 WBC (ref 0–0.2)
NT-PROBNP SERPL-MCNC: 2084 PG/ML (ref 0–1800)
PH UR STRIP.AUTO: <=5 [PH] (ref 5–8)
PLATELET # BLD AUTO: 140 10*3/MM3 (ref 140–450)
PMV BLD AUTO: 11.7 FL (ref 6–12)
POTASSIUM SERPL-SCNC: 4.8 MMOL/L (ref 3.5–5.2)
PROCALCITONIN SERPL-MCNC: 1.4 NG/ML (ref 0–0.25)
PROT SERPL-MCNC: 7.1 G/DL (ref 6–8.5)
PROT UR QL STRIP: ABNORMAL
PROTHROMBIN TIME: 35.5 SECONDS (ref 11.7–14.2)
QT INTERVAL: 339 MS
RBC # BLD AUTO: 4.44 10*6/MM3 (ref 4.14–5.8)
RBC # UR STRIP: ABNORMAL /HPF
REF LAB TEST METHOD: ABNORMAL
RHINOVIRUS RNA SPEC NAA+PROBE: NOT DETECTED
RSV RNA NPH QL NAA+NON-PROBE: NOT DETECTED
SARS-COV-2 RNA NPH QL NAA+NON-PROBE: NOT DETECTED
SODIUM SERPL-SCNC: 138 MMOL/L (ref 136–145)
SP GR UR STRIP: 1.02 (ref 1–1.03)
SQUAMOUS #/AREA URNS HPF: ABNORMAL /HPF
TROPONIN T SERPL-MCNC: 0.04 NG/ML (ref 0–0.03)
UROBILINOGEN UR QL STRIP: ABNORMAL
WBC # UR STRIP: ABNORMAL /HPF
WBC NRBC COR # BLD: 10.96 10*3/MM3 (ref 3.4–10.8)
WHOLE BLOOD HOLD COAG: NORMAL
WHOLE BLOOD HOLD SPECIMEN: NORMAL

## 2023-01-30 PROCEDURE — 80053 COMPREHEN METABOLIC PANEL: CPT | Performed by: EMERGENCY MEDICINE

## 2023-01-30 PROCEDURE — 93005 ELECTROCARDIOGRAM TRACING: CPT

## 2023-01-30 PROCEDURE — 71045 X-RAY EXAM CHEST 1 VIEW: CPT

## 2023-01-30 PROCEDURE — 83880 ASSAY OF NATRIURETIC PEPTIDE: CPT | Performed by: EMERGENCY MEDICINE

## 2023-01-30 PROCEDURE — 83605 ASSAY OF LACTIC ACID: CPT | Performed by: EMERGENCY MEDICINE

## 2023-01-30 PROCEDURE — 81001 URINALYSIS AUTO W/SCOPE: CPT | Performed by: EMERGENCY MEDICINE

## 2023-01-30 PROCEDURE — 99285 EMERGENCY DEPT VISIT HI MDM: CPT

## 2023-01-30 PROCEDURE — 85610 PROTHROMBIN TIME: CPT | Performed by: EMERGENCY MEDICINE

## 2023-01-30 PROCEDURE — 99222 1ST HOSP IP/OBS MODERATE 55: CPT | Performed by: NURSE PRACTITIONER

## 2023-01-30 PROCEDURE — 82962 GLUCOSE BLOOD TEST: CPT

## 2023-01-30 PROCEDURE — 85730 THROMBOPLASTIN TIME PARTIAL: CPT | Performed by: EMERGENCY MEDICINE

## 2023-01-30 PROCEDURE — 93005 ELECTROCARDIOGRAM TRACING: CPT | Performed by: EMERGENCY MEDICINE

## 2023-01-30 PROCEDURE — 84484 ASSAY OF TROPONIN QUANT: CPT | Performed by: EMERGENCY MEDICINE

## 2023-01-30 PROCEDURE — 87040 BLOOD CULTURE FOR BACTERIA: CPT | Performed by: EMERGENCY MEDICINE

## 2023-01-30 PROCEDURE — 0202U NFCT DS 22 TRGT SARS-COV-2: CPT | Performed by: EMERGENCY MEDICINE

## 2023-01-30 PROCEDURE — 25010000002 CEFTRIAXONE PER 250 MG: Performed by: HOSPITALIST

## 2023-01-30 PROCEDURE — 93010 ELECTROCARDIOGRAM REPORT: CPT | Performed by: STUDENT IN AN ORGANIZED HEALTH CARE EDUCATION/TRAINING PROGRAM

## 2023-01-30 PROCEDURE — 84145 PROCALCITONIN (PCT): CPT | Performed by: NURSE PRACTITIONER

## 2023-01-30 PROCEDURE — 85025 COMPLETE CBC W/AUTO DIFF WBC: CPT | Performed by: EMERGENCY MEDICINE

## 2023-01-30 PROCEDURE — 80162 ASSAY OF DIGOXIN TOTAL: CPT | Performed by: EMERGENCY MEDICINE

## 2023-01-30 PROCEDURE — 25010000002 LEVOFLOXACIN PER 250 MG: Performed by: EMERGENCY MEDICINE

## 2023-01-30 RX ORDER — NITROGLYCERIN 0.4 MG/1
0.4 TABLET SUBLINGUAL
Status: DISCONTINUED | OUTPATIENT
Start: 2023-01-30 | End: 2023-02-02 | Stop reason: HOSPADM

## 2023-01-30 RX ORDER — ACETAMINOPHEN 160 MG/5ML
650 SOLUTION ORAL EVERY 4 HOURS PRN
Status: DISCONTINUED | OUTPATIENT
Start: 2023-01-30 | End: 2023-02-02 | Stop reason: HOSPADM

## 2023-01-30 RX ORDER — BENZONATATE 100 MG/1
200 CAPSULE ORAL EVERY 8 HOURS PRN
Status: DISCONTINUED | OUTPATIENT
Start: 2023-01-30 | End: 2023-02-02 | Stop reason: HOSPADM

## 2023-01-30 RX ORDER — ACETAMINOPHEN 500 MG
1000 TABLET ORAL ONCE
Status: COMPLETED | OUTPATIENT
Start: 2023-01-30 | End: 2023-01-30

## 2023-01-30 RX ORDER — SODIUM CHLORIDE 0.9 % (FLUSH) 0.9 %
10 SYRINGE (ML) INJECTION EVERY 12 HOURS SCHEDULED
Status: DISCONTINUED | OUTPATIENT
Start: 2023-01-30 | End: 2023-02-02 | Stop reason: HOSPADM

## 2023-01-30 RX ORDER — SODIUM CHLORIDE 9 MG/ML
125 INJECTION, SOLUTION INTRAVENOUS CONTINUOUS
Status: DISCONTINUED | OUTPATIENT
Start: 2023-01-30 | End: 2023-01-30

## 2023-01-30 RX ORDER — METOPROLOL SUCCINATE 25 MG/1
12.5 TABLET, EXTENDED RELEASE ORAL DAILY
Status: DISCONTINUED | OUTPATIENT
Start: 2023-01-31 | End: 2023-01-31

## 2023-01-30 RX ORDER — WARFARIN SODIUM 7.5 MG/1
7.5 TABLET ORAL
Status: DISCONTINUED | OUTPATIENT
Start: 2023-01-30 | End: 2023-01-31 | Stop reason: DRUGHIGH

## 2023-01-30 RX ORDER — SODIUM CHLORIDE 9 MG/ML
75 INJECTION, SOLUTION INTRAVENOUS CONTINUOUS
Status: DISCONTINUED | OUTPATIENT
Start: 2023-01-30 | End: 2023-01-31

## 2023-01-30 RX ORDER — DIGOXIN 125 MCG
62.5 TABLET ORAL
Status: DISCONTINUED | OUTPATIENT
Start: 2023-01-31 | End: 2023-02-02 | Stop reason: HOSPADM

## 2023-01-30 RX ORDER — ONDANSETRON 2 MG/ML
4 INJECTION INTRAMUSCULAR; INTRAVENOUS EVERY 6 HOURS PRN
Status: DISCONTINUED | OUTPATIENT
Start: 2023-01-30 | End: 2023-02-02 | Stop reason: HOSPADM

## 2023-01-30 RX ORDER — ONDANSETRON 4 MG/1
4 TABLET, FILM COATED ORAL EVERY 6 HOURS PRN
Status: DISCONTINUED | OUTPATIENT
Start: 2023-01-30 | End: 2023-02-02 | Stop reason: HOSPADM

## 2023-01-30 RX ORDER — LEVOFLOXACIN 5 MG/ML
750 INJECTION, SOLUTION INTRAVENOUS ONCE
Status: COMPLETED | OUTPATIENT
Start: 2023-01-30 | End: 2023-01-30

## 2023-01-30 RX ORDER — FERROUS SULFATE 325(65) MG
325 TABLET ORAL EVERY OTHER DAY
Status: DISCONTINUED | OUTPATIENT
Start: 2023-01-31 | End: 2023-02-02 | Stop reason: HOSPADM

## 2023-01-30 RX ORDER — SODIUM CHLORIDE 0.9 % (FLUSH) 0.9 %
10 SYRINGE (ML) INJECTION AS NEEDED
Status: DISCONTINUED | OUTPATIENT
Start: 2023-01-30 | End: 2023-02-02 | Stop reason: HOSPADM

## 2023-01-30 RX ORDER — ACETAMINOPHEN 650 MG/1
650 SUPPOSITORY RECTAL EVERY 4 HOURS PRN
Status: DISCONTINUED | OUTPATIENT
Start: 2023-01-30 | End: 2023-02-02 | Stop reason: HOSPADM

## 2023-01-30 RX ORDER — FAMOTIDINE 20 MG/1
20 TABLET, FILM COATED ORAL DAILY
Status: DISCONTINUED | OUTPATIENT
Start: 2023-01-31 | End: 2023-02-02 | Stop reason: HOSPADM

## 2023-01-30 RX ORDER — WARFARIN SODIUM 5 MG/1
5 TABLET ORAL
Status: DISCONTINUED | OUTPATIENT
Start: 2023-02-02 | End: 2023-01-31

## 2023-01-30 RX ORDER — GUAIFENESIN 600 MG/1
1200 TABLET, EXTENDED RELEASE ORAL EVERY 12 HOURS SCHEDULED
Status: DISCONTINUED | OUTPATIENT
Start: 2023-01-30 | End: 2023-02-02 | Stop reason: HOSPADM

## 2023-01-30 RX ORDER — WARFARIN SODIUM 5 MG/1
7.5 TABLET ORAL SEE ADMIN INSTRUCTIONS
COMMUNITY
End: 2023-02-27 | Stop reason: SDUPTHER

## 2023-01-30 RX ORDER — SODIUM CHLORIDE 9 MG/ML
40 INJECTION, SOLUTION INTRAVENOUS AS NEEDED
Status: DISCONTINUED | OUTPATIENT
Start: 2023-01-30 | End: 2023-02-02 | Stop reason: HOSPADM

## 2023-01-30 RX ORDER — MAGNESIUM OXIDE 400 MG/1
400 TABLET ORAL 2 TIMES DAILY
Status: DISCONTINUED | OUTPATIENT
Start: 2023-01-30 | End: 2023-02-02 | Stop reason: HOSPADM

## 2023-01-30 RX ORDER — ATORVASTATIN CALCIUM 20 MG/1
40 TABLET, FILM COATED ORAL DAILY
Status: DISCONTINUED | OUTPATIENT
Start: 2023-01-31 | End: 2023-02-02 | Stop reason: HOSPADM

## 2023-01-30 RX ORDER — ACETAMINOPHEN 325 MG/1
650 TABLET ORAL EVERY 4 HOURS PRN
Status: DISCONTINUED | OUTPATIENT
Start: 2023-01-30 | End: 2023-02-02 | Stop reason: HOSPADM

## 2023-01-30 RX ORDER — BENZONATATE 100 MG/1
200 CAPSULE ORAL EVERY 4 HOURS PRN
Status: DISCONTINUED | OUTPATIENT
Start: 2023-01-30 | End: 2023-01-30 | Stop reason: DRUGHIGH

## 2023-01-30 RX ADMIN — SODIUM CHLORIDE 500 ML: 9 INJECTION, SOLUTION INTRAVENOUS at 12:07

## 2023-01-30 RX ADMIN — MAGNESIUM OXIDE 400 MG (241.3 MG MAGNESIUM) TABLET 400 MG: TABLET at 21:43

## 2023-01-30 RX ADMIN — SODIUM CHLORIDE 125 ML/HR: 9 INJECTION, SOLUTION INTRAVENOUS at 12:08

## 2023-01-30 RX ADMIN — GUAIFENESIN 1200 MG: 600 TABLET, EXTENDED RELEASE ORAL at 21:43

## 2023-01-30 RX ADMIN — Medication 10 ML: at 21:43

## 2023-01-30 RX ADMIN — SODIUM CHLORIDE 75 ML/HR: 9 INJECTION, SOLUTION INTRAVENOUS at 12:47

## 2023-01-30 RX ADMIN — LEVOFLOXACIN 750 MG: 5 INJECTION, SOLUTION INTRAVENOUS at 10:32

## 2023-01-30 RX ADMIN — WARFARIN SODIUM 7.5 MG: 7.5 TABLET ORAL at 17:27

## 2023-01-30 RX ADMIN — CEFTRIAXONE SODIUM 1 G: 1 INJECTION, POWDER, FOR SOLUTION INTRAMUSCULAR; INTRAVENOUS at 12:54

## 2023-01-30 RX ADMIN — ACETAMINOPHEN 1000 MG: 500 TABLET, FILM COATED ORAL at 10:30

## 2023-01-31 ENCOUNTER — APPOINTMENT (OUTPATIENT)
Dept: GENERAL RADIOLOGY | Facility: HOSPITAL | Age: 88
DRG: 871 | End: 2023-01-31
Payer: COMMERCIAL

## 2023-01-31 ENCOUNTER — APPOINTMENT (OUTPATIENT)
Dept: CARDIOLOGY | Facility: HOSPITAL | Age: 88
DRG: 871 | End: 2023-01-31
Payer: COMMERCIAL

## 2023-01-31 PROBLEM — R79.1 SUPRATHERAPEUTIC INR: Status: ACTIVE | Noted: 2023-01-31

## 2023-01-31 LAB
ANION GAP SERPL CALCULATED.3IONS-SCNC: 10.2 MMOL/L (ref 5–15)
AORTIC DIMENSIONLESS INDEX: 0.6 (DI)
ASCENDING AORTA: 3 CM
BH CV ECHO MEAS - AO MAX PG: 18.9 MMHG
BH CV ECHO MEAS - AO MEAN PG: 7.7 MMHG
BH CV ECHO MEAS - AO V2 MAX: 217.2 CM/SEC
BH CV ECHO MEAS - AO V2 VTI: 29.8 CM
BH CV ECHO MEAS - AVA(I,D): 1.85 CM2
BH CV ECHO MEAS - EDV(CUBED): 65.6 ML
BH CV ECHO MEAS - EDV(MOD-SP2): 130 ML
BH CV ECHO MEAS - EDV(MOD-SP4): 124 ML
BH CV ECHO MEAS - EF(MOD-BP): 49.7 %
BH CV ECHO MEAS - EF(MOD-SP2): 50.8 %
BH CV ECHO MEAS - EF(MOD-SP4): 50 %
BH CV ECHO MEAS - ESV(CUBED): 27.6 ML
BH CV ECHO MEAS - ESV(MOD-SP2): 64 ML
BH CV ECHO MEAS - ESV(MOD-SP4): 62 ML
BH CV ECHO MEAS - FS: 25 %
BH CV ECHO MEAS - IVS/LVPW: 1.03 CM
BH CV ECHO MEAS - IVSD: 1.14 CM
BH CV ECHO MEAS - LAT PEAK E' VEL: 15.3 CM/SEC
BH CV ECHO MEAS - LV MASS(C)D: 151.7 GRAMS
BH CV ECHO MEAS - LV MAX PG: 4.9 MMHG
BH CV ECHO MEAS - LV MEAN PG: 2.5 MMHG
BH CV ECHO MEAS - LV V1 MAX: 111.1 CM/SEC
BH CV ECHO MEAS - LV V1 VTI: 19.6 CM
BH CV ECHO MEAS - LVIDD: 4 CM
BH CV ECHO MEAS - LVIDS: 3 CM
BH CV ECHO MEAS - LVOT AREA: 2.8 CM2
BH CV ECHO MEAS - LVOT DIAM: 1.89 CM
BH CV ECHO MEAS - LVPWD: 1.11 CM
BH CV ECHO MEAS - MED PEAK E' VEL: 7 CM/SEC
BH CV ECHO MEAS - MR MAX PG: 59 MMHG
BH CV ECHO MEAS - MR MAX VEL: 384 CM/SEC
BH CV ECHO MEAS - MV DEC SLOPE: 542 CM/SEC2
BH CV ECHO MEAS - MV DEC TIME: 0.15 MSEC
BH CV ECHO MEAS - MV E MAX VEL: 111 CM/SEC
BH CV ECHO MEAS - MV MAX PG: 6.1 MMHG
BH CV ECHO MEAS - MV MEAN PG: 2.42 MMHG
BH CV ECHO MEAS - MV P1/2T: 68.7 MSEC
BH CV ECHO MEAS - MV V2 VTI: 18.6 CM
BH CV ECHO MEAS - MVA(P1/2T): 3.2 CM2
BH CV ECHO MEAS - MVA(VTI): 3 CM2
BH CV ECHO MEAS - PA ACC TIME: 0.06 SEC
BH CV ECHO MEAS - PA PR(ACCEL): 54 MMHG
BH CV ECHO MEAS - PA V2 MAX: 88 CM/SEC
BH CV ECHO MEAS - QP/QS: 0.22
BH CV ECHO MEAS - RAP SYSTOLE: 8 MMHG
BH CV ECHO MEAS - RV MAX PG: 0.65 MMHG
BH CV ECHO MEAS - RV V1 MAX: 40.3 CM/SEC
BH CV ECHO MEAS - RV V1 VTI: 4.5 CM
BH CV ECHO MEAS - RVOT DIAM: 1.88 CM
BH CV ECHO MEAS - RVSP: 41 MMHG
BH CV ECHO MEAS - SV(LVOT): 55.1 ML
BH CV ECHO MEAS - SV(MOD-SP2): 66 ML
BH CV ECHO MEAS - SV(MOD-SP4): 62 ML
BH CV ECHO MEAS - SV(RVOT): 12.3 ML
BH CV ECHO MEAS - TAPSE (>1.6): 1.51 CM
BH CV ECHO MEAS - TR MAX PG: 32.8 MMHG
BH CV ECHO MEAS - TR MAX VEL: 286.3 CM/SEC
BH CV ECHO MEASUREMENTS AVERAGE E/E' RATIO: 9.96
BH CV XLRA - RV BASE: 3.5 CM
BH CV XLRA - RV LENGTH: 5.9 CM
BH CV XLRA - RV MID: 2.7 CM
BH CV XLRA - TDI S': 7.2 CM/SEC
BUN SERPL-MCNC: 32 MG/DL (ref 8–23)
BUN/CREAT SERPL: 19.6 (ref 7–25)
CALCIUM SPEC-SCNC: 8.8 MG/DL (ref 8.6–10.5)
CHLORIDE SERPL-SCNC: 107 MMOL/L (ref 98–107)
CO2 SERPL-SCNC: 21.8 MMOL/L (ref 22–29)
CREAT SERPL-MCNC: 1.63 MG/DL (ref 0.76–1.27)
DEPRECATED RDW RBC AUTO: 44.7 FL (ref 37–54)
EGFRCR SERPLBLD CKD-EPI 2021: 41 ML/MIN/1.73
ERYTHROCYTE [DISTWIDTH] IN BLOOD BY AUTOMATED COUNT: 15.7 % (ref 12.3–15.4)
GLUCOSE BLDC GLUCOMTR-MCNC: 118 MG/DL (ref 70–130)
GLUCOSE SERPL-MCNC: 131 MG/DL (ref 65–99)
HCT VFR BLD AUTO: 34.2 % (ref 37.5–51)
HGB BLD-MCNC: 10.1 G/DL (ref 13–17.7)
INR PPP: 4.34 (ref 0.9–1.1)
LEFT ATRIUM VOLUME INDEX: 44 ML/M2
MAGNESIUM SERPL-MCNC: 2.6 MG/DL (ref 1.6–2.4)
MAXIMAL PREDICTED HEART RATE: 135 BPM
MCH RBC QN AUTO: 23.2 PG (ref 26.6–33)
MCHC RBC AUTO-ENTMCNC: 29.5 G/DL (ref 31.5–35.7)
MCV RBC AUTO: 78.6 FL (ref 79–97)
PHOSPHATE SERPL-MCNC: 2.6 MG/DL (ref 2.5–4.5)
PLATELET # BLD AUTO: 154 10*3/MM3 (ref 140–450)
PMV BLD AUTO: 11.5 FL (ref 6–12)
POTASSIUM SERPL-SCNC: 4.5 MMOL/L (ref 3.5–5.2)
PROTHROMBIN TIME: 42.2 SECONDS (ref 11.7–14.2)
RBC # BLD AUTO: 4.35 10*6/MM3 (ref 4.14–5.8)
SINUS: 2.7 CM
SODIUM SERPL-SCNC: 139 MMOL/L (ref 136–145)
STJ: 2.31 CM
STRESS TARGET HR: 115 BPM
TROPONIN T SERPL-MCNC: 0.02 NG/ML (ref 0–0.03)
WBC NRBC COR # BLD: 7.16 10*3/MM3 (ref 3.4–10.8)

## 2023-01-31 PROCEDURE — 84484 ASSAY OF TROPONIN QUANT: CPT | Performed by: NURSE PRACTITIONER

## 2023-01-31 PROCEDURE — 84100 ASSAY OF PHOSPHORUS: CPT | Performed by: HOSPITALIST

## 2023-01-31 PROCEDURE — 85027 COMPLETE CBC AUTOMATED: CPT | Performed by: NURSE PRACTITIONER

## 2023-01-31 PROCEDURE — 82962 GLUCOSE BLOOD TEST: CPT

## 2023-01-31 PROCEDURE — 99232 SBSQ HOSP IP/OBS MODERATE 35: CPT | Performed by: INTERNAL MEDICINE

## 2023-01-31 PROCEDURE — 25010000002 CEFTRIAXONE PER 250 MG: Performed by: HOSPITALIST

## 2023-01-31 PROCEDURE — 97530 THERAPEUTIC ACTIVITIES: CPT

## 2023-01-31 PROCEDURE — 25010000002 PERFLUTREN (DEFINITY) 8.476 MG IN SODIUM CHLORIDE (PF) 0.9 % 10 ML INJECTION: Performed by: NURSE PRACTITIONER

## 2023-01-31 PROCEDURE — 80048 BASIC METABOLIC PNL TOTAL CA: CPT | Performed by: NURSE PRACTITIONER

## 2023-01-31 PROCEDURE — 93306 TTE W/DOPPLER COMPLETE: CPT

## 2023-01-31 PROCEDURE — 85610 PROTHROMBIN TIME: CPT | Performed by: EMERGENCY MEDICINE

## 2023-01-31 PROCEDURE — 93306 TTE W/DOPPLER COMPLETE: CPT | Performed by: INTERNAL MEDICINE

## 2023-01-31 PROCEDURE — 92610 EVALUATE SWALLOWING FUNCTION: CPT

## 2023-01-31 PROCEDURE — 36415 COLL VENOUS BLD VENIPUNCTURE: CPT | Performed by: EMERGENCY MEDICINE

## 2023-01-31 PROCEDURE — 83735 ASSAY OF MAGNESIUM: CPT | Performed by: HOSPITALIST

## 2023-01-31 PROCEDURE — 97161 PT EVAL LOW COMPLEX 20 MIN: CPT

## 2023-01-31 PROCEDURE — 71045 X-RAY EXAM CHEST 1 VIEW: CPT

## 2023-01-31 RX ORDER — METOPROLOL SUCCINATE 25 MG/1
25 TABLET, EXTENDED RELEASE ORAL DAILY
Status: DISCONTINUED | OUTPATIENT
Start: 2023-02-01 | End: 2023-02-02 | Stop reason: HOSPADM

## 2023-01-31 RX ORDER — MUPIROCIN CALCIUM 20 MG/G
1 CREAM TOPICAL EVERY 12 HOURS SCHEDULED
Status: DISCONTINUED | OUTPATIENT
Start: 2023-01-31 | End: 2023-02-02 | Stop reason: HOSPADM

## 2023-01-31 RX ORDER — FUROSEMIDE 40 MG/1
40 TABLET ORAL DAILY
Status: DISCONTINUED | OUTPATIENT
Start: 2023-01-31 | End: 2023-02-01

## 2023-01-31 RX ADMIN — MUPIROCIN CALCIUM 1 APPLICATION: 20 CREAM TOPICAL at 20:46

## 2023-01-31 RX ADMIN — ACETAMINOPHEN 650 MG: 325 TABLET, FILM COATED ORAL at 20:47

## 2023-01-31 RX ADMIN — FUROSEMIDE 40 MG: 40 TABLET ORAL at 12:08

## 2023-01-31 RX ADMIN — GUAIFENESIN 1200 MG: 600 TABLET, EXTENDED RELEASE ORAL at 12:00

## 2023-01-31 RX ADMIN — MAGNESIUM OXIDE 400 MG (241.3 MG MAGNESIUM) TABLET 400 MG: TABLET at 20:45

## 2023-01-31 RX ADMIN — DIGOXIN 62.5 MCG: 125 TABLET ORAL at 11:58

## 2023-01-31 RX ADMIN — GUAIFENESIN 1200 MG: 600 TABLET, EXTENDED RELEASE ORAL at 20:45

## 2023-01-31 RX ADMIN — FERROUS SULFATE TAB 325 MG (65 MG ELEMENTAL FE) 325 MG: 325 (65 FE) TAB at 11:58

## 2023-01-31 RX ADMIN — PERFLUTREN 2 ML: 6.52 INJECTION, SUSPENSION INTRAVENOUS at 10:15

## 2023-01-31 RX ADMIN — MAGNESIUM OXIDE 400 MG (241.3 MG MAGNESIUM) TABLET 400 MG: TABLET at 11:59

## 2023-01-31 RX ADMIN — Medication 10 ML: at 11:00

## 2023-01-31 RX ADMIN — METOPROLOL SUCCINATE 12.5 MG: 25 TABLET, EXTENDED RELEASE ORAL at 12:02

## 2023-01-31 RX ADMIN — ATORVASTATIN CALCIUM 40 MG: 20 TABLET, FILM COATED ORAL at 12:08

## 2023-01-31 RX ADMIN — MUPIROCIN CALCIUM 1 APPLICATION: 20 CREAM TOPICAL at 14:13

## 2023-01-31 RX ADMIN — CEFTRIAXONE SODIUM 1 G: 1 INJECTION, POWDER, FOR SOLUTION INTRAMUSCULAR; INTRAVENOUS at 13:36

## 2023-01-31 RX ADMIN — FAMOTIDINE 20 MG: 20 TABLET, FILM COATED ORAL at 12:08

## 2023-01-31 RX ADMIN — Medication 10 ML: at 20:46

## 2023-02-01 ENCOUNTER — APPOINTMENT (OUTPATIENT)
Dept: GENERAL RADIOLOGY | Facility: HOSPITAL | Age: 88
DRG: 871 | End: 2023-02-01
Payer: COMMERCIAL

## 2023-02-01 LAB
ANION GAP SERPL CALCULATED.3IONS-SCNC: 9 MMOL/L (ref 5–15)
BUN SERPL-MCNC: 29 MG/DL (ref 8–23)
BUN/CREAT SERPL: 14.4 (ref 7–25)
CALCIUM SPEC-SCNC: 8.7 MG/DL (ref 8.6–10.5)
CHLORIDE SERPL-SCNC: 107 MMOL/L (ref 98–107)
CO2 SERPL-SCNC: 25 MMOL/L (ref 22–29)
CREAT SERPL-MCNC: 2.01 MG/DL (ref 0.76–1.27)
CREAT UR-MCNC: 36.4 MG/DL
DEPRECATED RDW RBC AUTO: 44.3 FL (ref 37–54)
EGFRCR SERPLBLD CKD-EPI 2021: 31.9 ML/MIN/1.73
ERYTHROCYTE [DISTWIDTH] IN BLOOD BY AUTOMATED COUNT: 15.5 % (ref 12.3–15.4)
GLUCOSE BLDC GLUCOMTR-MCNC: 136 MG/DL (ref 70–130)
GLUCOSE BLDC GLUCOMTR-MCNC: 139 MG/DL (ref 70–130)
GLUCOSE SERPL-MCNC: 178 MG/DL (ref 65–99)
HCT VFR BLD AUTO: 32.3 % (ref 37.5–51)
HGB BLD-MCNC: 9.9 G/DL (ref 13–17.7)
INR PPP: 4.99 (ref 0.9–1.1)
MCH RBC QN AUTO: 24.4 PG (ref 26.6–33)
MCHC RBC AUTO-ENTMCNC: 30.7 G/DL (ref 31.5–35.7)
MCV RBC AUTO: 79.6 FL (ref 79–97)
PLATELET # BLD AUTO: 156 10*3/MM3 (ref 140–450)
PMV BLD AUTO: 11.5 FL (ref 6–12)
POTASSIUM SERPL-SCNC: 4.5 MMOL/L (ref 3.5–5.2)
PROTHROMBIN TIME: 47.2 SECONDS (ref 11.7–14.2)
RBC # BLD AUTO: 4.06 10*6/MM3 (ref 4.14–5.8)
SODIUM SERPL-SCNC: 141 MMOL/L (ref 136–145)
SODIUM UR-SCNC: 126 MMOL/L
URATE SERPL-MCNC: 7.2 MG/DL (ref 3.4–7)
WBC NRBC COR # BLD: 7.51 10*3/MM3 (ref 3.4–10.8)

## 2023-02-01 PROCEDURE — 99232 SBSQ HOSP IP/OBS MODERATE 35: CPT | Performed by: INTERNAL MEDICINE

## 2023-02-01 PROCEDURE — 82570 ASSAY OF URINE CREATININE: CPT | Performed by: INTERNAL MEDICINE

## 2023-02-01 PROCEDURE — 25010000002 CEFTRIAXONE PER 250 MG: Performed by: HOSPITALIST

## 2023-02-01 PROCEDURE — 97530 THERAPEUTIC ACTIVITIES: CPT

## 2023-02-01 PROCEDURE — 97116 GAIT TRAINING THERAPY: CPT

## 2023-02-01 PROCEDURE — 74230 X-RAY XM SWLNG FUNCJ C+: CPT

## 2023-02-01 PROCEDURE — 92611 MOTION FLUOROSCOPY/SWALLOW: CPT

## 2023-02-01 PROCEDURE — 84300 ASSAY OF URINE SODIUM: CPT | Performed by: INTERNAL MEDICINE

## 2023-02-01 PROCEDURE — 84550 ASSAY OF BLOOD/URIC ACID: CPT | Performed by: NURSE PRACTITIONER

## 2023-02-01 PROCEDURE — 36415 COLL VENOUS BLD VENIPUNCTURE: CPT | Performed by: EMERGENCY MEDICINE

## 2023-02-01 PROCEDURE — 85027 COMPLETE CBC AUTOMATED: CPT | Performed by: NURSE PRACTITIONER

## 2023-02-01 PROCEDURE — 71045 X-RAY EXAM CHEST 1 VIEW: CPT

## 2023-02-01 PROCEDURE — 82962 GLUCOSE BLOOD TEST: CPT

## 2023-02-01 PROCEDURE — 80048 BASIC METABOLIC PNL TOTAL CA: CPT | Performed by: NURSE PRACTITIONER

## 2023-02-01 PROCEDURE — 85610 PROTHROMBIN TIME: CPT | Performed by: EMERGENCY MEDICINE

## 2023-02-01 RX ADMIN — GUAIFENESIN 1200 MG: 600 TABLET, EXTENDED RELEASE ORAL at 20:00

## 2023-02-01 RX ADMIN — DIGOXIN 62.5 MCG: 125 TABLET ORAL at 11:55

## 2023-02-01 RX ADMIN — MAGNESIUM OXIDE 400 MG (241.3 MG MAGNESIUM) TABLET 400 MG: TABLET at 08:43

## 2023-02-01 RX ADMIN — Medication 10 ML: at 08:45

## 2023-02-01 RX ADMIN — BARIUM SULFATE 50 ML: 400 SUSPENSION ORAL at 13:13

## 2023-02-01 RX ADMIN — METOPROLOL SUCCINATE 25 MG: 25 TABLET, EXTENDED RELEASE ORAL at 08:43

## 2023-02-01 RX ADMIN — MUPIROCIN CALCIUM 1 APPLICATION: 20 CREAM TOPICAL at 08:45

## 2023-02-01 RX ADMIN — BARIUM SULFATE 55 ML: 0.81 POWDER, FOR SUSPENSION ORAL at 13:13

## 2023-02-01 RX ADMIN — ATORVASTATIN CALCIUM 40 MG: 20 TABLET, FILM COATED ORAL at 08:43

## 2023-02-01 RX ADMIN — CEFTRIAXONE SODIUM 1 G: 1 INJECTION, POWDER, FOR SOLUTION INTRAMUSCULAR; INTRAVENOUS at 11:55

## 2023-02-01 RX ADMIN — GUAIFENESIN 1200 MG: 600 TABLET, EXTENDED RELEASE ORAL at 08:43

## 2023-02-01 RX ADMIN — Medication 10 ML: at 20:00

## 2023-02-01 RX ADMIN — MUPIROCIN CALCIUM 1 APPLICATION: 20 CREAM TOPICAL at 20:00

## 2023-02-01 RX ADMIN — FUROSEMIDE 40 MG: 40 TABLET ORAL at 08:43

## 2023-02-01 RX ADMIN — BARIUM SULFATE 1 TEASPOON(S): 0.6 CREAM ORAL at 13:13

## 2023-02-01 RX ADMIN — MAGNESIUM OXIDE 400 MG (241.3 MG MAGNESIUM) TABLET 400 MG: TABLET at 20:00

## 2023-02-01 RX ADMIN — FAMOTIDINE 20 MG: 20 TABLET, FILM COATED ORAL at 08:44

## 2023-02-01 RX ADMIN — BARIUM SULFATE 4 ML: 980 POWDER, FOR SUSPENSION ORAL at 13:13

## 2023-02-01 NOTE — PLAN OF CARE
Goal Outcome Evaluation:      Swallow study showed silent aspiration. NTL, chopped food. Abx continued. Cr bumped to 2 today, nephrology now on board. Home in next couple days.

## 2023-02-01 NOTE — PLAN OF CARE
Goal Outcome Evaluation:  Plan of Care Reviewed With: patient        Progress: no change  Outcome Evaluation: Pt was very weak and lethargic, checked blood sugar and it was wnl. Pt woke out of a sound sleep a few hours later and was little confused at first reoriented pt easily. Pt did fall asleep again and required O2 be applied. Pt wears CPAP at home and here has been on room air.  Pt c/o generalized pain. CTM, safety maintained.

## 2023-02-01 NOTE — CONSULTS
[facility] Consult Note      Patient Name: LAURA Sequeira  : 1935  MRN: [id number]  Primary Care Physician:  Celestine English DO  Referring Physician: Bruce Mitchell MD  Date of admission: 2023    Subjective     Reason for Consult:  BYRON on CKD3b    HPI:   LAURA Sequeira is a 87 y.o. male with CKD3b (baseline SCR 1.5-1.8) followed by Dr. Dominguez of our group (though last office was in ), admitted  for further evaluation of progressive shortness of breath, cough, and subjective fever for the preceding few days.  Found to have right greater than left pleural effusion as well as opacification of the right mid to lower hemithorax.  Placed on empiric treatment for pneumonia.  SCR on arrival 2.0, with value yesterday 1.6 and today again 2.0.  Full PMH outlined below; pertinent is atrial fibrillation, mechanical AVR on AC, DM2, immobility syndrome (significant weakness left leg, limiting mobility), and hypertension.    · Appetite has been poor since arrival, but has improved today; no N/V; no diarrhea; no abdominal pain  · No urinary complaints other than frequency  · Had had productive cough (brown phlegm) until yesterday, and today cough has mostly cleared  · Weight has been stable for the last few months  · No orthopnea or leg swelling    Review of Systems:   14 point review of systems is otherwise negative except for mentioned above on HPI    Personal History     Past Medical History:   Diagnosis Date   • Allergic rhinitis    • Aortic valve insufficiency    • Ascending aortic aneurysm    • Aspiration pneumonia (HCC)    • Atrial fibrillation (HCC)    • Bacteremia    • Calcific aortic stenosis of bicuspid valve    • Cardiac arrest (HCC)    • Cataracts, bilateral    • CKD (chronic kidney disease) stage 3, GFR 30-59 ml/min ([facility])    • Contact dermatitis due to poison ivy    • Elbow fracture    • GERD (gastroesophageal reflux disease)    • GI bleed     ; s/p Colonoscopy  and EGD   • H/O mechanical aortic valve replacement    • Head injury    • History of transfusion    • Hyperlipidemia    • Hypertension    • Kidney carcinoma (HCC)    • 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: follows with [facility] for management   • Nonischemic cardiomyopathy (HCC)    • Prostate cancer (HCC)    • Renal oncocytoma    • Stroke (cerebrum) (HCC)    • Type 2 diabetes mellitus (HCC)    • Visual field defect        Past Surgical History:   Procedure Laterality Date   • AORTIC VALVE REPAIR/REPLACEMENT     • ASCENDING AORTIC ANEURYSM REPAIR W/ MECHANICAL AORTIC VALVE REPLACEMENT     • CHOLECYSTECTOMY WITH INTRAOPERATIVE CHOLANGIOGRAM N/A 03/29/2022    Procedure: Laparoscopic cholecystectomy with cholangiogram, possible open;  Surgeon: Lisa Guidry MD;  Location: [facility] MAIN OR;  Service: General;  Laterality: N/A;   • COLONOSCOPY N/A 10/28/2021    Procedure: COLONOSCOPY to cecum:  cold snare polyps,;  Surgeon: Dinesh Meza MD;  Location: [facility] ENDOSCOPY;  Service: Gastroenterology;  Laterality: N/A;  pre:  Iron deficiency anemia  post:  polyps, diverticulosis,    • COLONOSCOPY N/A 11/09/2021    Procedure: COLONOSCOPY to cecum with APC cautery of AVM and clip placement x1;  Surgeon: Pavan Rodriguez MD;  Location: [facility] ENDOSCOPY;  Service: Gastroenterology;  Laterality: N/A;  PRE - gi bleed, anemia  POST - diverticulosis, poor prep, AVM right colon   • ENDOSCOPY N/A 10/28/2021    Procedure: ESOPHAGOGASTRODUODENOSCOPY with biopsies;  Surgeon: Dinesh Meza MD;  Location: [facility] ENDOSCOPY;  Service: Gastroenterology;  Laterality: N/A;  pre:  Iron deficiency anemia  post:  duodenitis and gastritis   • EYE SURGERY  12/09/2020    cataract surgery    • NEPHRECTOMY     • OTHER SURGICAL HISTORY      elbow surgery   • OTHER SURGICAL HISTORY      right arm surgery   • PROSTATE SURGERY      prostate removal   • THORACENTESIS Left     diagnostic        Family History: family history includes Cancer in his brother and mother.    Social History:  reports that he quit smoking about 53 years ago. His smoking use included cigarettes. He has a 25.00 pack-year smoking history. He has never been exposed to tobacco smoke. He has quit using smokeless tobacco. He reports that he does not currently use alcohol. He reports that he does not use drugs.    Home Medications:  Prior to Admission medications    Medication Sig Start Date End Date Taking? Authorizing Provider   acetaminophen (TYLENOL) 325 MG tablet Take 2 tablets by mouth Every 6 (Six) Hours As Needed for Mild Pain . 5/18/22  Yes Grant Andrade MD   atorvastatin (LIPITOR) 40 MG tablet TAKE ONE TABLET BY MOUTH DAILY  Patient taking differently: Take 40 mg by mouth Daily. 10/31/22  Yes Celestine English DO   Digoxin 62.5 MCG tablet Take 62.5 mcg by mouth Daily. 7/5/22  Yes Lamar Milan APRN   diphenhydrAMINE (BENADRYL) 25 mg capsule Take 1 capsule by mouth 2 (Two) Times a Day As Needed for Itching, Allergies or Sleep. 11/16/21  Yes Barbra Patino APRN   famotidine (PEPCID) 20 MG tablet Take 1 tablet by mouth Daily. 8/26/22  Yes Celestine English DO   ferrous gluconate (FERGON) 324 MG tablet TAKE ONE TABLET BY MOUTH EVERY OTHER DAY  Patient taking differently: Take 324 mg by mouth Daily With Breakfast. 12/13/22  Yes Celestine English DO   Insulin Disposable Pump (V-Go 40) kit Wear each V-Go for one 24-hour period. At the end of the 24-hour period, retract the needle by sliding and then pushing the Needle Release Button and remove the V-Go from your body 12/28/22  Yes Celestine English DO   insulin lispro (humaLOG) 100 UNIT/ML injection Inject  under the skin into the appropriate area as directed Take As Directed. Use as directed with V-Go Pump--NOT TAKING DUE TO LOW BG LEVELS SINCE HOSPITALIZATION  6/21/22  Yes Provider, MD Tristen   magnesium oxide (MAG-OX) 400 MG tablet Take 400 mg by mouth 2 (Two) Times a  Day.   Yes Provider, MD Tristen   metoprolol succinate XL (TOPROL-XL) 25 MG 24 hr tablet Take 0.5 tablets by mouth Daily for 90 days. 5/18/22 1/30/23 Yes Grant Andrade MD   warfarin (COUMADIN) 5 MG tablet Take one tablet by mouth on thurs and take one and one-half tablet by mouth all other days  Patient taking differently: Take 5 mg by mouth See Admin Instructions. Takes 5 mg once weekly on Thursdays 8/29/22  Yes Griffin Fried MD   warfarin (COUMADIN) 5 MG tablet Take 7.5 mg by mouth See Admin Instructions. Takes 1.5 tablets (7.5 mg) On Sundays, Mondays, Tuesdays, Wednesdays, Fridays and Saturdays   Yes Provider, MD Tristen       Allergies:  Allergies   Allergen Reactions   • Penicillins Swelling   • Oxycodone-Acetaminophen Nausea And Vomiting   • Other Other (See Comments)     Grass, ragweed   • Percocet [Oxycodone-Acetaminophen] Nausea And Vomiting       Objective     Vitals:   Temp:  [97.8 °F (36.6 °C)-98.3 °F (36.8 °C)] 97.8 °F (36.6 °C)  Heart Rate:  [86-89] 89  Resp:  [18-22] 22  BP: (105-128)/(66-78) 105/77  Flow (L/min):  [2] 2    Intake/Output Summary (Last 24 hours) at 2/1/2023 1418  Last data filed at 2/1/2023 1353  Gross per 24 hour   Intake 420 ml   Output 1225 ml   Net -805 ml       Physical Exam:   Constitutional: Awake, alert, NAD, thin  HEENT: Sclera anicteric, no conjunctival injection  Neck: Supple, carotid bruits, trachea at midline, no JVD  Respiratory: Absent breath sounds lower half on right and lower third on left; crackles bilaterally; not labored on room air  Cardiovascular: Irregularly irregular, 2/6M, no rub  Gastrointestinal: BS +, soft, nontender and nondistended  : No palpable bladder  Musculoskeletal: Trace pedal edema, no clubbing or cyanosis  Psychiatric: Appropriate affect, cooperative, fully oriented  Neurologic:  moving all extremities, normal speech and mental status  Skin: Warm and dry       Scheduled Meds:     atorvastatin, 40 mg, Oral,  Daily  cefTRIAXone, 1 g, Intravenous, Q24H  digoxin, 62.5 mcg, Oral, Daily  famotidine, 20 mg, Oral, Daily  ferrous sulfate, 325 mg, Oral, Every Other Day  guaiFENesin, 1,200 mg, Oral, Q12H  magnesium oxide, 400 mg, Oral, BID  metoprolol succinate XL, 25 mg, Oral, Daily  mupirocin, 1 application, Topical, Q12H  sodium chloride, 10 mL, Intravenous, Q12H      IV Meds:   Pharmacy to dose warfarin,         Results Reviewed:   I have personally reviewed the results from the time of this admission to 2/1/2023 14:18 EST     Lab Results   Component Value Date    GLUCOSE 178 (H) 02/01/2023    CALCIUM 8.7 02/01/2023     02/01/2023    K 4.5 02/01/2023    CO2 25.0 02/01/2023     02/01/2023    BUN 29 (H) 02/01/2023    CREATININE 2.01 (H) 02/01/2023    EGFRIFAFRI 50 (L) 03/02/2021    EGFRIFNONA 51 (L) 02/28/2022    BCR 14.4 02/01/2023    ANIONGAP 9.0 02/01/2023      Lab Results   Component Value Date    MG 2.6 (H) 01/31/2023    PHOS 2.6 01/31/2023    ALBUMIN 3.4 (L) 01/30/2023           Assessment / Plan     ASSESSMENT:  1.  BYRON on CKD3b, nonoliguric, likely prerenal from modest hypotension, poor oral intake, and recent infection.  Need to rule out obstructive uropathy. Volume stable by exam, bilateral pleural effusions notwithstanding (pneumonia); potassium normal; anion gap normal.  Urinalysis fairly bland with only trace blood, 6-12 RBCs/hpf, and 30 mg/dL protein in a concentrated specimen  2.  Pneumonia with bilateral pleural effusions, R > L  3.  Hypotension: States that his current systolic blood pressure (100) is lower than his usual readings  4.  Mechanical AVR  5.  Atrial fibrillation  6.  Elevated troponin, c/w NSTEMI type II  7.  PVD with prior stroke  8.  DM2 with renal complication  9.  Immobility syndrome with left leg weakness: places him at higher risk for urinary retention    PLAN:  1.  Encouraged him to eat  2.  FENa  3.  Bladder scan for completeness' sake  4.  Surveillance labs    Thank you for  involving us in the care of LAURA Sequeira.  Please feel free to call with any questions.    Ry Martin MD  02/01/23  14:18 [facility]    [facility]  [phone]      Please note that portions of this note were completed with a voice recognition program.

## 2023-02-01 NOTE — PROGRESS NOTES
Name: LAURA Sequeira ADMIT: 2023   : 1935  PCP: Celestine English     MRN: [id number] LOS: 2 days   AGE/SEX: 87 y.o. male  ROOM: [facility]     Subjective   Subjective   He is feeling better. He denies any complaints.   No chest pain fever or chills. No palpitations. No N/V/D    Review of Systems     Objective   Objective   Vital Signs  Temp:  [97.8 °F (36.6 °C)-98.3 °F (36.8 °C)] 97.8 °F (36.6 °C)  Heart Rate:  [] 89  Resp:  [18-22] 22  BP: (105-144)/(62-91) 105/77  SpO2:  [93 %-98 %] 98 %  on  Flow (L/min):  [2] 2;   Device (Oxygen Therapy): nasal cannula  Body mass index is 18.66 kg/m².  Physical Exam  Vitals reviewed.   Constitutional:       Appearance: He is well-developed. He is ill-appearing.   HENT:      Head: Normocephalic and atraumatic.   Cardiovascular:      Rate and Rhythm: Tachycardia present. Rhythm irregular.      Comments: Irregularly irregular  Pulmonary:      Breath sounds: Rales present.   Abdominal:      General: Bowel sounds are normal. There is no distension.      Palpations: Abdomen is soft.      Tenderness: There is no abdominal tenderness.   Musculoskeletal:      Right lower leg: No edema.      Left lower leg: No edema.   Skin:     General: Skin is warm and dry.   Neurological:      General: No focal deficit present.      Mental Status: He is alert and oriented to person, place, and time.   Psychiatric:         Mood and Affect: Mood normal.         Behavior: Behavior normal.         Thought Content: Thought content normal.       Results Review     I reviewed the patient's new clinical results.  Results from last 7 days   Lab Units 23  0357 23  0955   WBC 10*3/mm3 7.16 10.96*   HEMOGLOBIN g/dL 10.1* 10.7*   PLATELETS 10*3/mm3 154 140     Results from last 7 days   Lab Units 23  0357 23  0955   SODIUM mmol/L 139 138   POTASSIUM mmol/L 4.5 4.8   CHLORIDE mmol/L 107 104   CO2 mmol/L 21.8* 21.6*   BUN mg/dL 32* 34*   CREATININE mg/dL 1.63* 1.99*    GLUCOSE mg/dL 131* 231*   EGFR mL/min/1.73 41.0* 32.3*     Results from last 7 days   Lab Units 01/30/23  0955   ALBUMIN g/dL 3.4*   BILIRUBIN mg/dL 0.9   ALK PHOS U/L 124*   AST (SGOT) U/L 23   ALT (SGPT) U/L 19     Results from last 7 days   Lab Units 01/31/23  0357 01/30/23  0955   CALCIUM mg/dL 8.8 9.0   ALBUMIN g/dL  --  3.4*   MAGNESIUM mg/dL 2.6*  --    PHOSPHORUS mg/dL 2.6  --      Results from last 7 days   Lab Units 01/30/23  1248 01/30/23  0955   PROCALCITONIN ng/mL  --  1.40*   LACTATE mmol/L 1.2 2.9*     Lab Results   Lab Value Date/Time    TROPONINT 0.024 01/31/2023 0357    TROPONINT 0.041 (C) 01/30/2023 0955    TROPONINT 0.030 04/06/2022 0002    TROPONINT 0.018 03/30/2022 1625    TROPONINT 0.027 02/18/2022 2305    TROPONINT 0.011 10/23/2021 1617    TROPONINT 0.017 07/15/2021 2029    TROPONINT <0.010 01/13/2020 0747    TROPONINT <0.010 03/30/2017 0809       Glucose   Date/Time Value Ref Range Status   02/01/2023 0531 139 (H) 70 - 130 mg/dL Final     Comment:     Meter: [id number] : [id number] Margaret ALEJO   02/01/2023 0001 136 (H) 70 - 130 mg/dL Final     Comment:     Meter: [id number] : [id number] Margaret ALEJO   01/31/2023 0611 118 70 - 130 mg/dL Final     Comment:     Meter: [id number] : [id number] Henri Jimenez CNA   01/30/2023 2123 146 (H) 70 - 130 mg/dL Final     Comment:     Meter: [id number] : [id number] Henri SOLANOA   01/30/2023 1701 151 (H) 70 - 130 mg/dL Final     Comment:     Meter: [id number] : [id number] Rafael ALEJO       XR Chest 1 View    Result Date: 1/31/2023  Increased pleural effusion at the minor fissure. Otherwise, no significant change.  This report was finalized on 1/31/2023 1:10 PM by Dr. Chava Luke M.D.      XR Chest 1 View    Result Date: 1/30/2023  Interval worsening, with increased opacification of the right mid to lower hemithorax.  This report was finalized on 1/30/2023 10:22 AM by Dr. Chava Luke M.D.      I have  personally reviewed all medications:  Scheduled Medications  atorvastatin, 40 mg, Oral, Daily  cefTRIAXone, 1 g, Intravenous, Q24H  digoxin, 62.5 mcg, Oral, Daily  famotidine, 20 mg, Oral, Daily  ferrous sulfate, 325 mg, Oral, Every Other Day  furosemide, 40 mg, Oral, Daily  guaiFENesin, 1,200 mg, Oral, Q12H  magnesium oxide, 400 mg, Oral, BID  metoprolol succinate XL, 25 mg, Oral, Daily  mupirocin, 1 application, Topical, Q12H  sodium chloride, 10 mL, Intravenous, Q12H    Infusions  Pharmacy to dose warfarin,     Diet  Diet: Diabetic Diets; Consistent Carbohydrate; Texture: Regular Texture (IDDSI 7); Fluid Consistency: Thin (IDDSI 0)    I have personally reviewed:  [x]  Laboratory   [x]  Microbiology   [x]  Radiology   [x]  EKG/Telemetry  [x]  Cardiology/Vascular   [x]  Pathology    [x]  Records       Assessment/Plan     Active Hospital Problems    Diagnosis  POA   • **Pneumonia of right lower lobe due to infectious organism [J18.9]  Yes   • Supratherapeutic INR [R79.1]  Unknown   • History of CVA (cerebrovascular accident) [Z86.73]  Not Applicable   • Anemia [D64.9]  Yes   • History of Clostridium difficile infection [Z86.19]  Yes   • Chronic anticoagulation [Z79.01]  Not Applicable   • BYRON (acute kidney injury) (HCC) [N17.9]  Yes   • Stage 3b chronic kidney disease (HCC) [N18.32]  Yes   • History of aortic valve replacement with metallic valve [Z95.4]  Not Applicable   • Type 2 diabetes mellitus (HCC) [E11.9]  Yes   • Hypertension [I10]  Yes   • Atrial fibrillation (HCC) [I48.91]  Yes      Resolved Hospital Problems   No resolved problems to display.       87 y.o. male admitted with Pneumonia of right lower lobe due to infectious organism.        Mr. Sequeira is a 87 y.o. admitted with pneumonia of the right lower lobe.  Oxygen saturations stable on room air.  He has noted to have atrial fibrillation with RVR on admission and elevated troponin level with BYRON.     • Pneumonia right lower lobe  Continue IV  Rocephin.    Await SLP evaluation. Supportive care with antitussive and mucolytic agent.  Leukocytosis resolved.  Afebrile overnight.,     chest x-ray with fluid in right fissure, oral diuretics held today with increased Crt      • NSTEMI type II / elevated troponin level  No complaints of chest pain.  Suspect secondary to BYRON on CKD Appreciate cardiology.  No ischemic change on EKG.  No chest pain.     • CKD 3B  Creatinine elevated on admission likely due to poor p.o. intake overthe weekend. Increase in crt today. Perhaps his new baseline. Consult nephrology    • Medical aortic valve  INR goal 3-3.5 on Coumadin.   supratherapeutic.  Hold Coumadin.     • Atrial fibrillation  HR better, Cardiology following.  Now on low-dose Toprol     • History of CVA     • HTN  BP stable continue home regimen.     · Impaired mobility  PT recommends home health       • I discussed the patient's findings and my recommendations with patient, family, nursing staff and ED provider.     VTE Prophylaxis -holding warfarin (home med) while supratherapeutic  Code Status - Full code.   possible dc tmrw pending renal fx  ·       LIZA Rizvi  [facility]  02/01/23  09:54 EST

## 2023-02-01 NOTE — DISCHARGE PLACEMENT REQUEST
"LAURA Xie (87 y.o. Male)     Date of Birth   1935    Social Security Number       Address   [address] [place] [address]    Home Phone   [phone]    MRN   [id number]       Muslim   Congregation    Marital Status                               Admission Date   1/30/23    Admission Type   Emergency    Admitting Provider   Bruce Mitchell MD    Attending Provider   Gonsalo Cueva MD    Department, Room/Bed   [address], [address]       Discharge Date       Discharge Disposition       Discharge Destination                               Attending Provider: Gonsalo Cueva MD    Allergies: Penicillins, Oxycodone-acetaminophen, Other, Percocet [Oxycodone-acetaminophen]    Isolation: None   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)   Code Status: CPR    Ht: 183 cm (72.05\")   Wt: 62.5 kg (137 lb 12.6 oz)    Admission Cmt: None   Principal Problem: Pneumonia of right lower lobe due to infectious organism [J18.9]                 Active Insurance as of 1/30/2023     Primary Coverage     Payor Plan Insurance Group Employer/Plan Group    [employer] BLUE CROSS ANTH BLUE CROSS BLUE SHIELD PPO [id number]     Payor Plan Address Payor Plan Phone Number Payor Plan Fax Number Effective Dates    [address] [phone]  1/1/2021 - None Entered    [facility] [id number]       Subscriber Name Subscriber Birth Date Member ID       PATRICIA XIE 7/11/1955 [id number]           Secondary Coverage     Payor Plan Insurance Group Employer/Plan Group    MEDICARE MEDICARE A & B      Payor Plan Address Payor Plan Phone Number Payor Plan Fax Number Effective Dates    [address] [phone]  11/1/2002 - None Entered    [facility] [id number]       Subscriber Name Subscriber Birth Date Member ID       LAURA XIE 1935 [id number]                 Emergency Contacts      (Rel.) Home Phone Work Phone Mobile Phone    Patricia Xie (Spouse) [phone] -- " [phone]    Bruce Silva (Son) [phone] -- [phone]            {Outbreak/Travel/Exposure Documentation......;  Question Available Choices Patient Response   COVID-19 Outbreak Screen:  Do you currently have a new onset of the following symptoms?        Fever/Chills, Cough, Shortness of air, Loss of taste or smell, No, Unknown  (!) Cough;Shortness of Air;Fever/Chills (01/30/23 0925)   COVID-19 Outbreak Screen: In the last 14 days, have you had contact with anyone who is ill, has show any of the symptoms listed above and/or has been diagnosis with the 2019 Novel Coronavirus? This includes any immediate household members but excludes any patients with whom you have been in contact within your normal work duties wearing proper PPE, if you are a healthcare worker.  Yes, No, Unknown              No (01/30/23 0925)   COVID-19 Outbreak Screen: Who was notified? Free text (not recorded)   Ebola Screening Outbreak Screen: Have you traveled to the [place] or [place] within the past 21 days?  Yes, No, Unknown No (01/30/23 0925)   Ebola Screening Outbreak Screen: Do you have ANY of the following symptoms: Fever/Chills, Vomiting, Diarrhea, Fatigue, Headache, Muscle pain, Unexplained bleeding, Abdominal (stomach) pain, No, Unknown (not recorded)   Ebola Screening Outbreak Screen: Name of Person notified Free text (not recorded)   Travel Screen: Have you traveled in the last month? If so, to what country have you traveled? If [place] what state? Yes, No, Unknown  List of all countries  List of all States No (01/30/23 0925)  (not recorded)  (not recorded)   Infection Risk: Do you currently have the following symptoms?  (If cough is selected, the Tuberculosis Screen is performed.) Cough, Fever, Rash, No (!) Cough;Fever (01/30/23 0925)   Tuberculosis Screen: Do you have any of the following Tuberculosis Risks?  · Have you lived or spent time with anyone who had or may have TB?  · Have you lived in  or visited any of the following areas for more than one month: [place], [place], [place], [place] or [place], the [place] or [place]?  · Do you have HIV/AIDS?  · Have you lived in or worked in a nursing home, homeless shelter, correctional facility, or substance abuse treatment facility?   · No    If Yes do you have any of the following symptoms? Yes responses display to the right    If Yes, symptoms listed are:  Cough greater than or equal to 3 weeks, Loss of appetite, Unexplained weight loss, Night sweats, Bloody sputum or hemoptysis, Hoarseness, Fever, Fatigue, Chest pain, No No (01/30/23 0925)  (not recorded)   Exposure Screen: Have you been exposed to any of these contagious diseases in the last month? Measles, Chickenpox, Meningitis, Pertussis, Whooping Cough, No No (01/30/23 0925)

## 2023-02-01 NOTE — PROGRESS NOTES
[facility] Clinical Pharmacy Services: Warfarin Dosing/Monitoring Consult    LAURA Sequeira is a 87 y.o. male, estimated creatinine clearance is 30.5 mL/min (A) (by C-G formula based on SCr of 1.63 mg/dL (H)). weighing 65 kg (143 lb 4.8 oz).    Results from last 7 days   Lab Units 02/01/23  0403 01/31/23  0357 01/30/23  0955 01/27/23  0000   INR  4.99* 4.34* 3.48* 3.40   APTT seconds  --   --  69.5*  --    HEMOGLOBIN g/dL  --  10.1* 10.7*  --    HEMATOCRIT %  --  34.2* 35.3*  --    PLATELETS 10*3/mm3  --  154 140  --      Prior to admission anticoagulation: Warfarin 5 mg on Thursday and 7.5 mg all other days.     Hospital Anticoagulation:  Consulting provider: Dr. Bruce Mitchell  Start date: 1/30 (home medication)  Indication: A Fib - requiring full anticoagulation, Hx of mechanical aortic valve, Recent CVA  Target INR:  3.0-3.5 (patient had stroke with INR of 2.2 so INR goal adjusted)  Expected duration: Indefinite   Bridge Therapy: No    Potential food or drug interactions:   - Ceftriaxone- may enhance anticoagulant effect    Education complete?/Date: No; plan for follow up TBD    Assessment/Plan:  Continue to hold warfarin today as INR remains supratherapeutic, likely due to interaction with antibiotics.  Will continue to watch and restart when appropriate.  Monitor for any signs or symptoms of bleeding  Follow up daily INRs and dose adjustments    Pharmacy will continue to follow until discharge or discontinuation of warfarin.     Temi Godoy PharmD, BCPS       Vancomycin dosage adjustment per pharmacokinetic evaluation with trough ordered

## 2023-02-01 NOTE — PLAN OF CARE
Goal Outcome Evaluation:  Plan of Care Reviewed With: patient, spouse        Progress: improving  Outcome Evaluation: Pt seen for PT this AM and tolerated mobility well. Pt's INR remains elevated, but pt safe with mobility. Pt transferred to EOB with min A x1 and wife assisted with donning L knee brace. Pt stood with min A x1 and ambulated 10ft to bathroom. Required increased assist slowly lowering to commode and min A x1 to stand again. Pt ambulated 15ft to chair with min A x1 - cues for upright posture and rwx navigation. Assisted with repositioning in chair and left with needs met, wife present. Pt encouraged to call out for assist back to bed or up to bathroom. Pt's wife reports she assists the pt with everything at home - educated pt and wife about need for staff assistance for safety as pt has not been getting up as much as he normally would at home. PT will continue to follow to progress mobility as tolerated. Anticipate DC home with HHPT.

## 2023-02-01 NOTE — PAYOR COMM NOTE
"LAURA Xie (87 y.o. Male)     PLEASE SEE ATTACHED FOR INPT AUTH.     REF#[id number]    PLEASE CALL   OR  [phone]    THANK YOU    NAVDEEP LIEBERMAN LPN CCP    Date of Birth   1935    Social Security Number       Address   [address] [place] [address]    Home Phone   [phone]    MRN   [id number]       Anabaptist   Gnosticist    Marital Status                               Admission Date   1/30/23    Admission Type   Emergency    Admitting Provider   Bruec Mitchell MD    Attending Provider   Gonsalo Cueva MD    Department, Room/Bed   [address], [address]1       Discharge Date       Discharge Disposition       Discharge Destination                               Attending Provider: Gonsalo Cueva MD    Allergies: Penicillins, Oxycodone-acetaminophen, Other, Percocet [Oxycodone-acetaminophen]    Isolation: None   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)   Code Status: CPR    Ht: 183 cm (72.05\")   Wt: 62.5 kg (137 lb 12.6 oz)    Admission Cmt: None   Principal Problem: Pneumonia of right lower lobe due to infectious organism [J18.9]                 Active Insurance as of 1/30/2023     Primary Coverage     Payor Plan Insurance Group Employer/Plan Group    ANTHEM BLUE CROSS ANTHEM BLUE CROSS BLUE SHIELD PPO [id number]     Payor Plan Address Payor Plan Phone Number Payor Plan Fax Number Effective Dates    [address] [phone]  1/1/2021 - None Entered    [facility] [id number]       Subscriber Name Subscriber Birth Date Member ID       PATRICIA XIE 7/11/1955 [id number]           Secondary Coverage     Payor Plan Insurance Group Employer/Plan Group    MEDICARE MEDICARE A & B      Payor Plan Address Payor Plan Phone Number Payor Plan Fax Number Effective Dates    [address] [phone]  11/1/2002 - None Entered    [facility] [place] [facility] [id number]       Subscriber Name Subscriber Birth Date Member ID       LAURA XIE 1935 " [id number]                 Emergency Contacts      (Rel.) Home Phone Work Phone Mobile Phone    Gladys Sequeira (Spouse) [phone] -- [phone]    Bruce Silva (Son) [phone] -- [phone]            Gibson: GARRISON [id number]  Tax ID [id number]     History & Physical      LicoLatrice, APRN at 01/30/23 1516              Patient Name:  Betito Sequeira  YOB: 1935  MRN:  [id number]  Admit Date:  1/30/2023  Patient Care Team:  Celestine English DO as PCP - General (Internal Medicine)  Audra Vazquez RPH as Pharmacist  Vasquez Niño PharmD as Pharmacist (Pharmacy)  Tatiana Tinoco MD as Consulting Physician (Gastroenterology)      Subjective    History Present Illness     Chief Complaint   Patient presents with   • Shortness of Breath   • Cough       Mr. Sequeira is a 87 y.o. 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  Patient is not a very good historian but the wife is at bedside.  She states if is feeling poorly over the weekend with decreased appetite some malaise and a persistent cough that was worse overnight.  He also had chills with subjective fever and was seen in the urgent care but was referred to the ER.  Patient denies chest pain, palpitations, nausea, vomiting, abdominal pain, congestion.  He did not take any medications to relieve his symptoms.  He also has a history of aspiration pneumonia and does report occasional trouble swallowing with cough when drinking.    History of Present Illness  Review of Systems   Constitutional: Positive for activity change, appetite change, chills, fatigue and fever.   HENT: Negative for trouble swallowing.    Respiratory: Positive for cough. Negative for choking, chest tightness and shortness of breath.    Cardiovascular: Negative for chest pain.   Gastrointestinal: Negative for abdominal distention,  abdominal pain, blood in stool, constipation, diarrhea, nausea and vomiting.   Endocrine: Negative for polydipsia, polyphagia and polyuria.   Genitourinary: Negative for difficulty urinating.   Musculoskeletal: Negative for back pain.   Skin: Negative for pallor.   Neurological: Negative for dizziness.        Personal History     Past Medical History:   Diagnosis Date   • Allergic rhinitis    • Aortic valve insufficiency    • Ascending aortic aneurysm    • Aspiration pneumonia (HCC)    • Atrial fibrillation (HCC)    • Bacteremia    • Calcific aortic stenosis of bicuspid valve    • Cardiac arrest (HCC)    • Cataracts, bilateral    • CKD (chronic kidney disease) stage 3, GFR 30-59 ml/min (HCC)    • Contact dermatitis due to poison ivy    • Elbow fracture    • GERD (gastroesophageal reflux disease)    • GI bleed     2021; s/p Colonoscopy and EGD   • H/O mechanical aortic valve replacement    • Head injury    • History of transfusion    • Hyperlipidemia    • Hypertension    • Kidney carcinoma (HCC)    • 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: follows with [facility] for management   • Nonischemic cardiomyopathy (HCC)    • Prostate cancer (HCC)    • Renal oncocytoma    • Stroke (cerebrum) (HCC)    • Type 2 diabetes mellitus (HCC)    • Visual field defect      Past Surgical History:   Procedure Laterality Date   • AORTIC VALVE REPAIR/REPLACEMENT     • ASCENDING AORTIC ANEURYSM REPAIR W/ MECHANICAL AORTIC VALVE REPLACEMENT     • CHOLECYSTECTOMY WITH INTRAOPERATIVE CHOLANGIOGRAM N/A 03/29/2022    Procedure: Laparoscopic cholecystectomy with cholangiogram, possible open;  Surgeon: Lisa Guidry MD;  Location: [facility] MAIN OR;  Service: General;  Laterality: N/A;   • COLONOSCOPY N/A 10/28/2021    Procedure: COLONOSCOPY to cecum:  cold snare polyps,;  Surgeon: Dinesh Meza MD;  Location: [facility] ENDOSCOPY;  Service: Gastroenterology;  Laterality: N/A;  pre:  Iron  deficiency anemia  post:  polyps, diverticulosis,    • COLONOSCOPY N/A 2021    Procedure: COLONOSCOPY to cecum with APC cautery of AVM and clip placement x1;  Surgeon: Pavan Rodriguez MD;  Location: [facility] ENDOSCOPY;  Service: Gastroenterology;  Laterality: N/A;  PRE - gi bleed, anemia  POST - diverticulosis, poor prep, AVM right colon   • ENDOSCOPY N/A 10/28/2021    Procedure: ESOPHAGOGASTRODUODENOSCOPY with biopsies;  Surgeon: Dinesh Meza MD;  Location: [facility] ENDOSCOPY;  Service: Gastroenterology;  Laterality: N/A;  pre:  Iron deficiency anemia  post:  duodenitis and gastritis   • EYE SURGERY  2020    cataract surgery    • NEPHRECTOMY     • OTHER SURGICAL HISTORY      elbow surgery   • OTHER SURGICAL HISTORY      right arm surgery   • PROSTATE SURGERY      prostate removal   • THORACENTESIS Left     diagnostic     Family History   Problem Relation Age of Onset   • Cancer Mother         colon   • Cancer Brother         colon     Social History     Tobacco Use   • Smoking status: Former     Packs/day: 1.00     Years: 25.00     Pack years: 25.00     Types: Cigarettes     Quit date:      Years since quittin.1     Passive exposure: Never   • Smokeless tobacco: Former   Vaping Use   • Vaping Use: Never used   Substance Use Topics   • Alcohol use: Not Currently     Alcohol/week: 0.0 - 1.0 standard drinks   • Drug use: Never     No current facility-administered medications on file prior to encounter.     Current Outpatient Medications on File Prior to Encounter   Medication Sig Dispense Refill   • acetaminophen (TYLENOL) 325 MG tablet Take 2 tablets by mouth Every 6 (Six) Hours As Needed for Mild Pain .     • atorvastatin (LIPITOR) 40 MG tablet TAKE ONE TABLET BY MOUTH DAILY (Patient taking differently: Take 40 mg by mouth Daily.) 90 tablet 1   • Digoxin 62.5 MCG tablet Take 62.5 mcg by mouth Daily. 30 tablet    • diphenhydrAMINE (BENADRYL) 25 mg capsule Take 1 capsule by mouth 2  (Two) Times a Day As Needed for Itching, Allergies or Sleep.     • famotidine (PEPCID) 20 MG tablet Take 1 tablet by mouth Daily. 90 tablet 1   • ferrous gluconate (FERGON) 324 MG tablet TAKE ONE TABLET BY MOUTH EVERY OTHER DAY (Patient taking differently: Take 324 mg by mouth Daily With Breakfast.) 45 tablet 0   • Insulin Disposable Pump (V-Go 40) kit Wear each V-Go for one 24-hour period. At the end of the 24-hour period, retract the needle by sliding and then pushing the Needle Release Button and remove the V-Go from your body 30 each 2   • insulin lispro (humaLOG) 100 UNIT/ML injection Inject  under the skin into the appropriate area as directed Take As Directed. Use as directed with V-Go Pump--NOT TAKING DUE TO LOW BG LEVELS SINCE HOSPITALIZATION      • magnesium oxide (MAG-OX) 400 MG tablet Take 400 mg by mouth 2 (Two) Times a Day.     • metoprolol succinate XL (TOPROL-XL) 25 MG 24 hr tablet Take 0.5 tablets by mouth Daily for 90 days. 45 tablet 0   • warfarin (COUMADIN) 5 MG tablet Take one tablet by mouth on thurs and take one and one-half tablet by mouth all other days (Patient taking differently: Take 5 mg by mouth See Admin Instructions. Takes 5 mg once weekly on Thursdays) 130 tablet 1   • warfarin (COUMADIN) 5 MG tablet Take 7.5 mg by mouth See Admin Instructions. Takes 1.5 tablets (7.5 mg) On Sundays, Mondays, Tuesdays, Wednesdays, Fridays and Saturdays     • [DISCONTINUED] Continuous Blood Gluc Sensor (FreeStyle Vance 14 Day Sensor) misc 1 application Every 14 (Fourteen) Days. 6 each 3   • [DISCONTINUED] Insulin Lispro (humaLOG) 100 UNIT/ML injection USE AS DIRECTED WITH V-GO PUMP 30 mL 6     Allergies   Allergen Reactions   • Penicillins Swelling   • Oxycodone-Acetaminophen Nausea And Vomiting   • Other Other (See Comments)     Grass, ragweed   • Percocet [Oxycodone-Acetaminophen] Nausea And Vomiting       Objective     Objective     Vital Signs  Temp:  [97.9 °F (36.6 °C)-100.7 °F (38.2 °C)] 97.9 °F  (36.6 °C)  Heart Rate:  [] 80  Resp:  [18-24] 18  BP: (109-152)/(54-65) 119/57  SpO2:  [92 %-100 %] 100 %  on   ;   Device (Oxygen Therapy): room air  There is no height or weight on file to calculate BMI.    Physical Exam  Vitals and nursing note reviewed.   Constitutional:       Appearance: He is well-developed. He is ill-appearing (Mildly ill-appearing NAD).      Comments: Upon entering the room he takes a sip of water from a straw and starts coughing   HENT:      Head: Normocephalic and atraumatic.   Eyes:      Conjunctiva/sclera: Conjunctivae normal.   Neck:      Trachea: No tracheal deviation.   Cardiovascular:      Rate and Rhythm: Normal rate and regular rhythm.   Pulmonary:      Effort: Pulmonary effort is normal. No respiratory distress.      Breath sounds: Rhonchi present. No wheezing.      Comments: Diminished breath sounds  Abdominal:      General: Bowel sounds are normal. There is no distension.      Palpations: Abdomen is soft. There is no mass.      Tenderness: There is no abdominal tenderness. There is no guarding or rebound.   Musculoskeletal:         General: Normal range of motion.      Cervical back: Normal range of motion.      Right lower leg: No edema.      Left lower leg: No edema.   Skin:     General: Skin is warm and dry.   Neurological:      Mental Status: He is alert and oriented to person, place, and time.   Psychiatric:         Judgment: Judgment normal.         Results Review:  I reviewed the patient's new clinical results.  I reviewed the patient's new imaging results and agree with the interpretation.  I reviewed the patient's other test results and agree with the interpretation  I personally viewed and interpreted the patient's EKG/Telemetry data  Discussed with ED provider.    Lab Results (last 24 hours)     Procedure Component Value Units Date/Time    Covid-19 + Flu A&B AG, Veritor [id number] [[id number]]  (Normal) Resulted: 01/30/23 0843    Specimen: Swab Updated: 01/30/23  0851     COVID19 Not Detected     Influenza A Antigen RADHA Not Detected     Influenza B Antigen RADHA Not Detected     Internal Control Passed     Lot Number [id number]     Expiration Date 03/22/2024    CBC & Differential [[id number]]  (Abnormal) Collected: 01/30/23 0955    Specimen: Blood Updated: 01/30/23 1014    Narrative:      The following orders were created for panel order CBC & Differential.  Procedure                               Abnormality         Status                     ---------                               -----------         ------                     CBC Auto Differential[[id number]]        Abnormal            Final result                 Please view results for these tests on the individual orders.    Comprehensive Metabolic Panel [[id number]]  (Abnormal) Collected: 01/30/23 0955    Specimen: Blood Updated: 01/30/23 1122     Glucose 231 mg/dL      BUN 34 mg/dL      Creatinine 1.99 mg/dL      Sodium 138 mmol/L      Potassium 4.8 mmol/L      Chloride 104 mmol/L      CO2 21.6 mmol/L      Calcium 9.0 mg/dL      Total Protein 7.1 g/dL      Albumin 3.4 g/dL      ALT (SGPT) 19 U/L      AST (SGOT) 23 U/L      Alkaline Phosphatase 124 U/L      Total Bilirubin 0.9 mg/dL      Globulin 3.7 gm/dL      A/G Ratio 0.9 g/dL      BUN/Creatinine Ratio 17.1     Anion Gap 12.4 mmol/L      eGFR 32.3 mL/min/1.73     Narrative:      GFR Normal >60  Chronic Kidney Disease <60  Kidney Failure <15    The GFR formula is only valid for adults with stable renal function between ages 18 and 70.    Protime-INR [[id number]]  (Abnormal) Collected: 01/30/23 0955    Specimen: Blood Updated: 01/30/23 1022     Protime 35.5 Seconds      INR 3.48    aPTT [[id number]]  (Abnormal) Collected: 01/30/23 0955    Specimen: Blood Updated: 01/30/23 1022     PTT 69.5 seconds     Troponin [[id number]]  (Abnormal) Collected: 01/30/23 0955    Specimen: Blood Updated: 01/30/23 1124     Troponin T 0.041 ng/mL     Narrative:      Troponin T Reference  Range:  <= 0.03 ng/mL-   Negative for AMI  >0.03 ng/mL-     Abnormal for myocardial necrosis.  Clinicians would have to utilize clinical acumen, EKG, Troponin and serial changes to determine if it is an Acute Myocardial Infarction or myocardial injury due to an underlying chronic condition.       Results may be falsely decreased if patient taking Biotin.      BNP [[id number]]  (Abnormal) Collected: 01/30/23 0955    Specimen: Blood Updated: 01/30/23 1054     proBNP 2,084.0 pg/mL     Narrative:      Among patients with dyspnea, NT-proBNP is highly sensitive for the detection of acute congestive heart failure. In addition NT-proBNP of <300 pg/ml effectively rules out acute congestive heart failure with 99% negative predictive value.    Results may be falsely decreased if patient taking Biotin.      Lactic Acid, Plasma [[id number]]  (Abnormal) Collected: 01/30/23 0955    Specimen: Blood Updated: 01/30/23 1139     Lactate 2.9 mmol/L     Blood Culture - Blood, Arm, Left [[id number]] Collected: 01/30/23 0955    Specimen: Blood from Arm, Left Updated: 01/30/23 1033    Digoxin Level [[id number]]  (Normal) Collected: 01/30/23 0955    Specimen: Blood Updated: 01/30/23 1150     Digoxin 0.70 ng/mL     CBC Auto Differential [[id number]]  (Abnormal) Collected: 01/30/23 0955    Specimen: Blood Updated: 01/30/23 1014     WBC 10.96 10*3/mm3      RBC 4.44 10*6/mm3      Hemoglobin 10.7 g/dL      Hematocrit 35.3 %      MCV 79.5 fL      MCH 24.1 pg      MCHC 30.3 g/dL      RDW 15.7 %      RDW-SD 44.8 fl      MPV 11.7 fL      Platelets 140 10*3/mm3      Neutrophil % 85.1 %      Lymphocyte % 5.7 %      Monocyte % 8.4 %      Eosinophil % 0.2 %      Basophil % 0.2 %      Immature Grans % 0.4 %      Neutrophils, Absolute 9.34 10*3/mm3      Lymphocytes, Absolute 0.62 10*3/mm3      Monocytes, Absolute 0.92 10*3/mm3      Eosinophils, Absolute 0.02 10*3/mm3      Basophils, Absolute 0.02 10*3/mm3      Immature Grans, Absolute 0.04 10*3/mm3      nRBC  0.0 /100 WBC     Procalcitonin [[id number]] Collected: 01/30/23 0955    Specimen: Blood Updated: 01/30/23 1321    Blood Culture - Blood, Arm, Right [[id number]] Collected: 01/30/23 1024    Specimen: Blood from Arm, Right Updated: 01/30/23 1033    Respiratory Panel PCR w/COVID-19(SARS-CoV-2) CHRIS/ALEXANDRE/OG/PAD/COR/MAD/JAMILA In-House, NP Swab in UTM/VTM, 3-4 HR TAT - Swab, Nasopharynx [[id number]]  (Normal) Collected: 01/30/23 1032    Specimen: Swab from Nasopharynx Updated: 01/30/23 1211     ADENOVIRUS, PCR Not Detected     Coronavirus 229E Not Detected     Coronavirus HKU1 Not Detected     Coronavirus NL63 Not Detected     Coronavirus OC43 Not Detected     COVID19 Not Detected     Human Metapneumovirus Not Detected     Human Rhinovirus/Enterovirus Not Detected     Influenza A PCR Not Detected     Influenza B PCR Not Detected     Parainfluenza Virus 1 Not Detected     Parainfluenza Virus 2 Not Detected     Parainfluenza Virus 3 Not Detected     Parainfluenza Virus 4 Not Detected     RSV, PCR Not Detected     Bordetella pertussis pcr Not Detected     Bordetella parapertussis PCR Not Detected     Chlamydophila pneumoniae PCR Not Detected     Mycoplasma pneumo by PCR Not Detected    Narrative:      In the setting of a positive respiratory panel with a viral infection PLUS a negative procalcitonin without other underlying concern for bacterial infection, consider observing off antibiotics or discontinuation of antibiotics and continue supportive care. If the respiratory panel is positive for atypical bacterial infection (Bordetella pertussis, Chlamydophila pneumoniae, or Mycoplasma pneumoniae), consider antibiotic de-escalation to target atypical bacterial infection.    STAT Lactic Acid, Reflex [[id number]]  (Normal) Collected: 01/30/23 1248    Specimen: Blood Updated: 01/30/23 1341     Lactate 1.2 mmol/L           Imaging Results (Last 24 Hours)     Procedure Component Value Units Date/Time    XR Chest 1 View [[id number]]  Collected: 01/30/23 1020     Updated: 01/30/23 1025    Narrative:      XR CHEST 1 VW-     HISTORY: Male who is 87 years-old,  cough and fever, sepsis     TECHNIQUE: Frontal view of the chest     COMPARISON: 01/30/2023 at 0842 hours     FINDINGS: The heart is enlarged. Sternotomy wires are noted. Pulmonary  vasculature is unremarkable. Opacification of the right mid to lower  hemithorax is increased, suggesting increased atelectasis/infiltrate and  moderate right pleural effusion. Minimal left pleural effusion is  apparent. No pneumothorax. No acute osseous process.       Impression:      Interval worsening, with increased opacification of the  right mid to lower hemithorax.     This report was finalized on 1/30/2023 10:22 AM by Dr. Chava Luke M.D.             Results for orders placed during the hospital encounter of 10/23/21    Adult transthoracic echo complete    Interpretation Summary  · Estimated left ventricular EF = 60% Left ventricular systolic function is normal.  · Left ventricular diastolic function was indeterminate.  · The right ventricular cavity is mild to moderately dilated.  · Left atrial volume is moderately increased.  · The right atrial cavity is moderately dilated.  · There is a mechanical aortic valve present. The aortic valve peak and mean gradients are within defined limits.  · Moderate tricuspid valve regurgitation is present.  · Severe pulmonary hypertension is present. Calculated right ventricular systolic pressure from tricuspid regurgitation is 63.7 mmHg.      ECG 12 Lead Dyspnea   Final Result   HEART RATE= 116  bpm   RR Interval= 517  ms   FL Interval=   ms   P Horizontal Axis=   deg   P Front Axis=   deg   QRSD Interval= 125  ms   QT Interval= 339  ms   QRS Axis= -49  deg   T Wave Axis= 108  deg   - ABNORMAL ECG -   Atrial fibrillation   Nonspecific IVCD with LAD   LVH with secondary repolarization abnormality   When compared with ECG of 05-Apr-2022 23:15:46,   Significant  rate increase   Electronically Signed By: Stephon Stovall ([facility]) 30-Jan-2023 14:54:13   Date and Time of Study: 2023-01-30 09:38:44          Assessment/Plan     Active Hospital Problems    Diagnosis  POA   • **Pneumonia of right lower lobe due to infectious organism [J18.9]  Yes   • History of CVA (cerebrovascular accident) [Z86.73]  Not Applicable   • Anemia [D64.9]  Yes   • History of Clostridium difficile infection [Z86.19]  Yes   • Chronic anticoagulation [Z79.01]  Not Applicable   • BYRON (acute kidney injury) (HCC) [N17.9]  Yes   • Stage 3b chronic kidney disease (HCC) [N18.32]  Yes   • History of aortic valve replacement with metallic valve [Z95.4]  Not Applicable   • Type 2 diabetes mellitus (HCC) [E11.9]  Yes   • Hypertension [I10]  Yes   • Atrial fibrillation (HCC) [I48.91]  Yes      Resolved Hospital Problems   No resolved problems to display.       Mr. Sequeira is a 87 y.o. admitted with pneumonia of the right lower lobe.  Oxygen saturations stable on room air.  He has noted to have atrial fibrillation with RVR on admission and elevated troponin level with BYRON.    · Pneumonia right lower lobe    Cautious IV fluids despite sepsis given CHF history.  Continue IV Rocephin.  Consult SLP consider changing to IV Zosyn if noted to have dysphagia.  Supportive care with antitussive and mucolytic agent.  Pro-Bunny pending  BNP elevated but does not appear volume overloaded on exam     · Elevated troponin level  No complaints of chest pain.  Suspect secondary to BYRON on CKD.  Trend troponin.  Appreciate cardiology    · CKD 3B  Creatinine elevated on admission likely due to poor p.o. intake over the weekend.  Volume status closely repeat renal function in the morning following IV fluids.  Creatinine sign closer to 1.1 in the last year.    · Medical aortic valve  INR goal 3-3.5 on Coumadin    · Atrial fibrillation   in the ER 120s but 80s on exam.  Cardiology following.    · History of CVA    · HTN  BP stable continue  home regimen.       ·    · I discussed the patient's findings and my recommendations with patient, family, nursing staff and ED provider.    VTE Prophylaxis - Warfarin (home med).  Code Status - Full code.       LIZA Rizvi  [facility]  01/30/23  15:16 EST    Electronically signed by Latrice Barfield APRN at 01/30/23 1527          Emergency Department Notes      Angelica Puri RN at 01/30/23 0944        Pt to ED from [facility] [address] c/o SOA x 3 days c cough, pt states [facility] [address] sent him here for fluid on his lungs, pt denies CP, denies n/v/d, pt a/o x 4 at this time    PPE per protocol utilized throughout entire patient encounter.       Electronically signed by Angelica Puri RN at 01/30/23 0945     Vasquez Mclean MD at 01/30/23 1001           EMERGENCY DEPARTMENT ENCOUNTER    Room Number:  24/24  Date of encounter:  1/30/2023  PCP: Celestine English DO  Historian: Patient and spouse who was at bedside    Patient was placed in face mask during triage process. Patient was wearing facemask when I entered the room and throughout our encounter. I wore full protective equipment throughout this patient encounter including a face mask, eye protection, and gloves. Hand hygiene was performed before donning protective equipment and again following doffing of PPE after leaving the room.    HPI:  Chief Complaint: General malaise with cough and chills  A complete HPI/ROS/PMH/PSH/SH/FH are unobtainable due to: N/A   Context: Francisco Sequeira is a 87 y.o. male who presents to the ED c/o gradual development of general malaise over the last 48 hours with development of cough last night and chills this morning.  Patient denies ongoing chest pain or dyspnea at rest.  His appetite is dropped off but he denies any abdominal pain, nausea or vomiting.  No change in bowel habits and denies dysuria.  Symptom is mild at this time.      MEDICAL HISTORY REVIEW  EMR reviewed:    Admit date: 4/5/2022  Discharge date and  time:4/20/2022  Discharged Condition: good     Discharge Diagnoses:        Active Hospital Problems     Diagnosis   POA   • **Hematoma of iliopsoas muscle, left, initial encounter [S70.12XA]   Yes   • History of CVA (cerebrovascular accident) [Z86.73]   Not Applicable   • S/P laparoscopic cholecystectomy [Z90.49]   Not Applicable   • Anemia [D64.9]   Yes   • Chronic anticoagulation [Z79.01]   Not Applicable   • Stage 3b chronic kidney disease (HCC) [N18.32]   Yes   • H/O heart valve replacement with mechanical valve [Z95.2]   Not Applicable   • Type 2 diabetes mellitus (HCC) [E11.9]   Yes   • Hypertension [I10]   Yes   • Atrial fibrillation (HCC) [I48.91]           PAST MEDICAL HISTORY  Active Ambulatory Problems     Diagnosis Date Noted   • Atrial fibrillation (HCC)    • Hypertension    • Atopic rhinitis 03/21/2016   • Gastroesophageal reflux disease 03/21/2016   • Hyperlipidemia 03/21/2016   • Type 2 diabetes mellitus (HCC) 03/21/2016   • Low testosterone 04/03/2017   • History of aortic valve replacement with metallic valve 09/19/2018   • Kidney carcinoma (HCC) 01/13/2020   • Stage 3b chronic kidney disease (HCC) 01/13/2020   • BYRON (acute kidney injury) (HCC) 01/14/2020   • 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) 04/09/2020   • Iron deficiency anemia    • Chronic anticoagulation    • Hemiparesis of left nondominant side as late effect of cerebral infarction (HCC) 11/02/2021   • Rectus sheath hematoma 11/05/2021   • Hospital discharge follow-up 11/19/2021   • Sepsis (HCC) 03/23/2022   • Calculus of gallbladder with acute cholecystitis without obstruction 03/23/2022   • History of Clostridium difficile infection 03/23/2022   • Aspiration pneumonitis (HCC) 03/23/2022   • Hematoma of iliopsoas muscle, left, initial encounter 04/06/2022   • History of CVA (cerebrovascular accident) 04/06/2022   • S/P laparoscopic cholecystectomy 04/06/2022   • Anemia 04/06/2022   • Hematoma  of left iliopsoas muscle 04/20/2022     Resolved Ambulatory Problems     Diagnosis Date Noted   • Cardiomyopathy (HCC)    • Uncontrolled type 2 diabetes mellitus 03/21/2016   • Poison ivy 09/06/2016   • Low testosterone 04/07/2017   • Medicare annual wellness visit, subsequent 10/30/2017   • Sinusitis 05/01/2018   • Hx of mitral valve replacement with mechanical valve [Z95.2] 09/19/2018   • Screening for iron deficiency anemia 11/01/2018   • Stroke-like symptom 01/13/2020   • Acute cerebral infarction (HCC) 01/14/2020   • Left-sided weakness 10/24/2021   • Pleural effusion on right 10/24/2021   • Lower GI bleed 11/02/2021   • History of nephrectomy 11/02/2021   • Abnormal urinalysis 11/02/2021   • Pleural effusion 11/02/2021   • Acute posthemorrhagic anemia 11/05/2021   • Angiodysplasia of colon without hemorrhage 11/10/2021   • C. difficile colitis 02/15/2022     Past Medical History:   Diagnosis Date   • Allergic rhinitis    • Aortic valve insufficiency    • Ascending aortic aneurysm    • Aspiration pneumonia ([facility])    • Bacteremia    • Calcific aortic stenosis of bicuspid valve    • Cardiac arrest ([facility])    • Cataracts, bilateral    • CKD (chronic kidney disease) stage 3, GFR 30-59 ml/min ([facility])    • Contact dermatitis due to poison ivy    • Elbow fracture    • GERD (gastroesophageal reflux disease)    • GI bleed    • H/O mechanical aortic valve replacement    • Head injury    • History of transfusion    • Lumbosacral plexopathy    • Nonischemic cardiomyopathy (HCC)    • Prostate cancer (HCC)    • Renal oncocytoma    • Stroke (cerebrum) ([facility])    • Visual field defect          PAST SURGICAL HISTORY  Past Surgical History:   Procedure Laterality Date   • AORTIC VALVE REPAIR/REPLACEMENT     • ASCENDING AORTIC ANEURYSM REPAIR W/ MECHANICAL AORTIC VALVE REPLACEMENT     • CHOLECYSTECTOMY WITH INTRAOPERATIVE CHOLANGIOGRAM N/A 03/29/2022    Procedure: Laparoscopic cholecystectomy with cholangiogram, possible open;  Surgeon:  Lisa Guidry MD;  Location: [facility] MAIN OR;  Service: General;  Laterality: N/A;   • COLONOSCOPY N/A 10/28/2021    Procedure: COLONOSCOPY to cecum:  cold snare polyps,;  Surgeon: Dinesh Meza MD;  Location: [facility] ENDOSCOPY;  Service: Gastroenterology;  Laterality: N/A;  pre:  Iron deficiency anemia  post:  polyps, diverticulosis,    • COLONOSCOPY N/A 2021    Procedure: COLONOSCOPY to cecum with APC cautery of AVM and clip placement x1;  Surgeon: Pavan Rodriguez MD;  Location: [facility] ENDOSCOPY;  Service: Gastroenterology;  Laterality: N/A;  PRE - gi bleed, anemia  POST - diverticulosis, poor prep, AVM right colon   • ENDOSCOPY N/A 10/28/2021    Procedure: ESOPHAGOGASTRODUODENOSCOPY with biopsies;  Surgeon: Dinesh Meza MD;  Location: [facility] ENDOSCOPY;  Service: Gastroenterology;  Laterality: N/A;  pre:  Iron deficiency anemia  post:  duodenitis and gastritis   • EYE SURGERY  2020    cataract surgery    • NEPHRECTOMY     • OTHER SURGICAL HISTORY      elbow surgery   • OTHER SURGICAL HISTORY      right arm surgery   • PROSTATE SURGERY      prostate removal   • THORACENTESIS Left     diagnostic         FAMILY HISTORY  Family History   Problem Relation Age of Onset   • Cancer Mother         colon   • Cancer Brother         colon         SOCIAL HISTORY  Social History     Socioeconomic History   • Marital status:    Tobacco Use   • Smoking status: Former     Packs/day: 1.00     Years: 25.00     Pack years: 25.00     Types: Cigarettes     Quit date: 1970     Years since quittin.1     Passive exposure: Never   • Smokeless tobacco: Former   Vaping Use   • Vaping Use: Never used   Substance and Sexual Activity   • Alcohol use: Not Currently     Alcohol/week: 0.0 - 1.0 standard drinks   • Drug use: Never   • Sexual activity: Defer         ALLERGIES  Penicillins, Oxycodone-acetaminophen, Other, and Percocet [oxycodone-acetaminophen]        REVIEW OF SYSTEMS  Review of  Systems     All systems reviewed and negative except for those discussed in HPI.       PHYSICAL EXAM    I have reviewed the triage vital signs and nursing notes.    ED Triage Vitals   Temp Heart Rate Resp BP SpO2   01/30/23 0926 01/30/23 0926 01/30/23 0926 01/30/23 0935 01/30/23 0926   (!) 100.7 °F (38.2 °C) 118 20 141/62 92 %      Temp src Heart Rate Source Patient Position BP Location FiO2 (%)   01/30/23 0926 01/30/23 0926 -- -- --   Tympanic Monitor          Physical Exam    Physical Exam   Constitutional: No distress.  Very pleasant.  Ill-appearing though not overtly toxic.  HENT:  Head: Normocephalic and atraumatic.   Oropharynx: Mucous membranes are moist.   Eyes: No scleral icterus. No conjunctival pallor.  Neck: Painless range of motion noted. Neck supple.   Cardiovascular: Tachycardic with irregularly irregular rhythm and intact distal pulses.  Pulmonary/Chest: Significant increased work of breathing though the patient is mildly tachypneic.  He is able to speak in full sentences.  Diminished breath sounds right greater than left in the bases.    Abdominal: Soft. There is no tenderness. There is no rebound and no guarding.   Musculoskeletal: Moves all extremities equally. There is no pedal edema or calf tenderness.   Neurological: Alert.  Baseline strength and sensation noted.   Skin: Skin is pink, warm, and dry. No pallor.   Psychiatric: Mood and affect normal.   Nursing note and vitals reviewed.    LAB RESULTS  Recent Results (from the past 24 hour(s))   [id number]    Collection Time: 01/30/23  8:43 AM    Specimen: Swab   Result Value Ref Range    COVID19 Not Detected     Influenza A Antigen RADHA Not Detected     Influenza B Antigen RADHA Not Detected     Internal Control Passed     Lot Number [id number]     Expiration Date 03/22/2024    ECG 12 Lead Dyspnea    Collection Time: 01/30/23  9:38 AM   Result Value Ref Range    QT Interval 339 ms   Green Top (Gel)    Collection Time:  01/30/23  9:55 AM   Result Value Ref Range    Extra Tube Hold for add-ons.    Lavender Top    Collection Time: 01/30/23  9:55 AM   Result Value Ref Range    Extra Tube hold for add-on    Light Blue Top    Collection Time: 01/30/23  9:55 AM   Result Value Ref Range    Extra Tube Hold for add-ons.    Comprehensive Metabolic Panel    Collection Time: 01/30/23  9:55 AM    Specimen: Blood   Result Value Ref Range    Glucose 231 (H) 65 - 99 mg/dL    BUN 34 (H) 8 - 23 mg/dL    Creatinine 1.99 (H) 0.76 - 1.27 mg/dL    Sodium 138 136 - 145 mmol/L    Potassium 4.8 3.5 - 5.2 mmol/L    Chloride 104 98 - 107 mmol/L    CO2 21.6 (L) 22.0 - 29.0 mmol/L    Calcium 9.0 8.6 - 10.5 mg/dL    Total Protein 7.1 6.0 - 8.5 g/dL    Albumin 3.4 (L) 3.5 - 5.2 g/dL    ALT (SGPT) 19 1 - 41 U/L    AST (SGOT) 23 1 - 40 U/L    Alkaline Phosphatase 124 (H) 39 - 117 U/L    Total Bilirubin 0.9 0.0 - 1.2 mg/dL    Globulin 3.7 gm/dL    A/G Ratio 0.9 g/dL    BUN/Creatinine Ratio 17.1 7.0 - 25.0    Anion Gap 12.4 5.0 - 15.0 mmol/L    eGFR 32.3 (L) >60.0 mL/min/1.73   Protime-INR    Collection Time: 01/30/23  9:55 AM    Specimen: Blood   Result Value Ref Range    Protime 35.5 (H) 11.7 - 14.2 Seconds    INR 3.48 (H) 0.90 - 1.10   aPTT    Collection Time: 01/30/23  9:55 AM    Specimen: Blood   Result Value Ref Range    PTT 69.5 (H) 22.7 - 35.4 seconds   Troponin    Collection Time: 01/30/23  9:55 AM    Specimen: Blood   Result Value Ref Range    Troponin T 0.041 (C) 0.000 - 0.030 ng/mL   BNP    Collection Time: 01/30/23  9:55 AM    Specimen: Blood   Result Value Ref Range    proBNP 2,084.0 (H) 0.0 - 1,800.0 pg/mL   Lactic Acid, Plasma    Collection Time: 01/30/23  9:55 AM    Specimen: Blood   Result Value Ref Range    Lactate 2.9 (C) 0.5 - 2.0 mmol/L   Digoxin Level    Collection Time: 01/30/23  9:55 AM    Specimen: Blood   Result Value Ref Range    Digoxin 0.70 0.60 - 1.20 ng/mL   CBC Auto Differential    Collection Time: 01/30/23  9:55 AM    Specimen:  Blood   Result Value Ref Range    WBC 10.96 (H) 3.40 - 10.80 10*3/mm3    RBC 4.44 4.14 - 5.80 10*6/mm3    Hemoglobin 10.7 (L) 13.0 - 17.7 g/dL    Hematocrit 35.3 (L) 37.5 - 51.0 %    MCV 79.5 79.0 - 97.0 fL    MCH 24.1 (L) 26.6 - 33.0 pg    MCHC 30.3 (L) 31.5 - 35.7 g/dL    RDW 15.7 (H) 12.3 - 15.4 %    RDW-SD 44.8 37.0 - 54.0 fl    MPV 11.7 6.0 - 12.0 fL    Platelets 140 140 - 450 10*3/mm3    Neutrophil % 85.1 (H) 42.7 - 76.0 %    Lymphocyte % 5.7 (L) 19.6 - 45.3 %    Monocyte % 8.4 5.0 - 12.0 %    Eosinophil % 0.2 (L) 0.3 - 6.2 %    Basophil % 0.2 0.0 - 1.5 %    Immature Grans % 0.4 0.0 - 0.5 %    Neutrophils, Absolute 9.34 (H) 1.70 - 7.00 10*3/mm3    Lymphocytes, Absolute 0.62 (L) 0.70 - 3.10 10*3/mm3    Monocytes, Absolute 0.92 (H) 0.10 - 0.90 10*3/mm3    Eosinophils, Absolute 0.02 0.00 - 0.40 10*3/mm3    Basophils, Absolute 0.02 0.00 - 0.20 10*3/mm3    Immature Grans, Absolute 0.04 0.00 - 0.05 10*3/mm3    nRBC 0.0 0.0 - 0.2 /100 WBC   Respiratory Panel PCR w/COVID-19(SARS-CoV-2) CHRIS/ALEXANDRE/OG/PAD/COR/MAD/JAMILA In-House, NP Swab in [facility], 3-4 HR TAT - Swab, Nasopharynx    Collection Time: 01/30/23 10:32 AM    Specimen: Nasopharynx; Swab   Result Value Ref Range    ADENOVIRUS, PCR Not Detected Not Detected    Coronavirus 229E Not Detected Not Detected    Coronavirus HKU1 Not Detected Not Detected    Coronavirus NL63 Not Detected Not Detected    Coronavirus OC43 Not Detected Not Detected    COVID19 Not Detected Not Detected - Ref. Range    Human Metapneumovirus Not Detected Not Detected    Human Rhinovirus/Enterovirus Not Detected Not Detected    Influenza A PCR Not Detected Not Detected    Influenza B PCR Not Detected Not Detected    Parainfluenza Virus 1 Not Detected Not Detected    Parainfluenza Virus 2 Not Detected Not Detected    Parainfluenza Virus 3 Not Detected Not Detected    Parainfluenza Virus 4 Not Detected Not Detected    RSV, PCR Not Detected Not Detected    Bordetella pertussis pcr Not Detected Not  Detected    Bordetella parapertussis PCR Not Detected Not Detected    Chlamydophila pneumoniae PCR Not Detected Not Detected    Mycoplasma pneumo by PCR Not Detected Not Detected       Ordered the above labs and independently reviewed the results.        RADIOLOGY  XR Chest 1 View    Result Date: 1/30/2023  XR CHEST 1 VW-  HISTORY: Male who is 87 years-old,  cough and fever, sepsis  TECHNIQUE: Frontal view of the chest  COMPARISON: 01/30/2023 at 0842 hours  FINDINGS: The heart is enlarged. Sternotomy wires are noted. Pulmonary vasculature is unremarkable. Opacification of the right mid to lower hemithorax is increased, suggesting increased atelectasis/infiltrate and moderate right pleural effusion. Minimal left pleural effusion is apparent. No pneumothorax. No acute osseous process.      Interval worsening, with increased opacification of the right mid to lower hemithorax.  This report was finalized on 1/30/2023 10:22 AM by Dr. Chava Luke M.D.        I ordered the above noted radiological studies. Reviewed by me and discussed with radiologist.  See dictation for official radiology interpretation.      PROCEDURES    Procedures        MEDICATIONS GIVEN IN ER    Medications   sodium chloride 0.9 % flush 10 mL (has no administration in time range)   sodium chloride 0.9 % bolus 500 mL (500 mL Intravenous New Bag 1/30/23 1207)     Followed by   sodium chloride 0.9 % infusion (125 mL/hr Intravenous New Bag 1/30/23 1208)   cefTRIAXone (ROCEPHIN) 1 g in sodium chloride 0.9 % 100 mL IVPB-VTB (has no administration in time range)   acetaminophen (TYLENOL) tablet 1,000 mg (1,000 mg Oral Given 1/30/23 1030)   levoFLOXacin (LEVAQUIN) 750 mg/150 mL D5W (premix) (LEVAQUIN) 750 mg (0 mg Intravenous Stopped 1/30/23 1207)         PROGRESS, DATA ANALYSIS, CONSULTS, AND MEDICAL DECISION MAKING    My differential diagnosis for fever includes but is not limited:  To viral infections including COVID-19, bacterial infections, fungal  infections, fever of unknown origin, auto regulatory dysfunction, hyperthermia, heat exhaustion, heat stroke, malignant neuroleptic syndrome and others.    My differential diagnosis for cough includes but is not limited to:  Upper respiratory infection, bronchitis, pneumonia, COPD exacerbation, cough variant asthma, cardiac asthma, coronary artery disease, congestive heart failure, bacterial, viral or lung infections, lung cancer, aspiration pneumonitis, aspiration of foreign body and Covid -19    Total critical care time: Approximately 25 minutes    Due to a high probability of clinically significant, life threatening deterioration, the patient required my highest level of preparedness to intervene emergently and I personally spent this critical care time directly and personally managing the patient. This critical care time included obtaining a history; examining the patient; vital sign monitoring; ordering and review of studies; arranging urgent treatment with development of a management plan; evaluation of patient's response to treatment; frequent reassessment; and, discussions with other providers.    This critical care time was performed to assess and manage the high probability of imminent, life-threatening deterioration that could result in multi-organ failure. It was exclusive of separately billable procedures and treating other patients and teaching time.    Please see MDM section and the rest of the note for further information on patient assessment and treatment.      All labs have been independently reviewed by me.  All radiology studies have been reviewed by me and discussed with radiologist dictating the report.   EKG's independently viewed and interpreted by me.  Discussion below represents my analysis of pertinent findings related to patient's condition, differential diagnosis, treatment plan and final disposition.      ED Course as of 01/30/23 1231   Mon Jan 30, 2023   1002 EKG           EKG time: 0  938  Rhythm/Rate: A. fib with elevated ventricular rate; 120  P waves and ND: N/A  QRS, axis: IVCD  ST and T waves: ST/T wave repolarization abnormality    Interpreted Contemporaneously by me, independently viewed  Comparison: 4/22-previously noted A. fib with IVCD   [RS]   1017 Sepsis evaluation initiated. [RS]   1018 I personally reviewed and interpreted the single view portable chest x-ray concomitant treatment.  Patient has moderately dense right lower lobe infiltrate and some mild diffuse interstitial findings. [RS]   1019 Patient with rather serious allergy to penicillin.  Levaquin will be selected rather than ceftriaxone given that history. [RS]   1019 WBC(!): 10.96 [RS]   1019 Hemoglobin(!): 10.7 [RS]   1019 Platelets: 140 [RS]   1141 Glucose(!): 231 [RS]   1141 BUN(!): 34 [RS]   1141 Creatinine(!): 1.99  Slightly greater than chronic renal insufficiency. [RS]   1141 INR(!): 3.48  Supratherapeutic [RS]   1141 Lactate(!!): 2.9  Antibiotics ordered.  Patient is a suboptimal candidate for large fluid boluses secondary to cardiac history slightly elevated proBNP.  500 fluid bolus of normal saline followed by 100 and hourly ordered. [RS]   1144 I reviewed all findings and plan for admission with patient and his spouse and they are agreeable. [RS]   1226 CONSULT        Provider: Dr. Mitchell[facility]    Discussion: Reviewed patient history, ED presentation and evaluation as well as therapies that been initiated in the ED.  He is agreeable accept the patient for full admission with telemetry for further evaluation and treatment.    Agreeable c treatment and planned disposition.         [RS]   1230 CONSULT        Provider: Dr. Fried-cardiology    Discussion: Reviewed patient history, ED presentation and evaluation as well as plan for admission to the medicine service.  Agreeable to consult.    Agreeable c treatment and planned disposition.         [RS]      ED Course User Index  [RS] Vasquez Mclean MD       AS OF 12:31  EST VITALS:    BP - 123/54  HR - 94  TEMP - (!) 100.7 °F (38.2 °C) (Tympanic)  O2 SATS - 98%        DIAGNOSIS  Final diagnoses:   Pneumonia of right lower lobe due to infectious organism   Sepsis, due to unspecified organism, unspecified whether acute organ dysfunction present (HCC)   Acute renal failure superimposed on chronic kidney disease, unspecified CKD stage, unspecified acute renal failure type (HCC)   Hyperglycemia due to diabetes mellitus (HCC)   Troponin level elevated         DISPOSITION  ADMISSION    Discussed treatment plan and reason for admission with pt/family and admitting physician.  Pt/family voiced understanding of the plan for admission for further testing/treatment as needed.          Vasquez Mclean MD  01/30/23 1231      Electronically signed by Vasquez Mclean MD at 01/30/23 1231     Angelica Puri, RN at 01/30/23 1212        Pt aware of urine specimen needed, unable to void at this time, urinal left at bedside    Electronically signed by Angelica Puri, RN at 01/30/23 1212     Angelica Puri, RN at 01/30/23 1259          Nursing report ED to floor  Betito AMIN Sequeira  87 y.o.  male    HPI :   Chief Complaint   Patient presents with   • Shortness of Breath   • Cough       Admitting doctor:   Bruce Mitchell MD    Admitting diagnosis:   The primary encounter diagnosis was Pneumonia of right lower lobe due to infectious organism. Diagnoses of Sepsis, due to unspecified organism, unspecified whether acute organ dysfunction present (HCC), Acute renal failure superimposed on chronic kidney disease, unspecified CKD stage, unspecified acute renal failure type (HCC), Hyperglycemia due to diabetes mellitus (HCC), and Troponin level elevated were also pertinent to this visit.    Code status:   Current Code Status       Date Active Code Status Order ID Comments User Context       Prior            Allergies:   Penicillins, Oxycodone-acetaminophen, Other, and Percocet  [oxycodone-acetaminophen]    Isolation:   Enhanced Droplet/Contact     Intake and Output    Intake/Output Summary (Last 24 hours) at 1/30/2023 1259  Last data filed at 1/30/2023 1247  Gross per 24 hour   Intake 731.25 ml   Output --   Net 731.25 ml       Weight:   There were no vitals filed for this visit.    Most recent vitals:   Vitals:    01/30/23 0937 01/30/23 1001 01/30/23 1231 01/30/23 1251   BP:  123/54 110/65    Pulse:  94 88    Resp:       Temp:    98.2 °F (36.8 °C)   TempSrc:    Oral   SpO2: 95% 98% 95%        Active LDAs/IV Access:   Lines, Drains & Airways       Active LDAs       Name Placement date Placement time Site Days    Peripheral IV 01/30/23 0954 Right Antecubital 01/30/23  0954  Antecubital  less than 1                    Labs (abnormal labs have a star):   Labs Reviewed   COMPREHENSIVE METABOLIC PANEL - Abnormal; Notable for the following components:       Result Value    Glucose 231 (*)     BUN 34 (*)     Creatinine 1.99 (*)     CO2 21.6 (*)     Albumin 3.4 (*)     Alkaline Phosphatase 124 (*)     eGFR 32.3 (*)     All other components within normal limits    Narrative:     GFR Normal >60  Chronic Kidney Disease <60  Kidney Failure <15    The GFR formula is only valid for adults with stable renal function between ages 18 and 70.   PROTIME-INR - Abnormal; Notable for the following components:    Protime 35.5 (*)     INR 3.48 (*)     All other components within normal limits   APTT - Abnormal; Notable for the following components:    PTT 69.5 (*)     All other components within normal limits   TROPONIN (IN-HOUSE) - Abnormal; Notable for the following components:    Troponin T 0.041 (*)     All other components within normal limits    Narrative:     Troponin T Reference Range:  <= 0.03 ng/mL-   Negative for AMI  >0.03 ng/mL-     Abnormal for myocardial necrosis.  Clinicians would have to utilize clinical acumen, EKG, Troponin and serial changes to determine if it is an Acute Myocardial Infarction  or myocardial injury due to an underlying chronic condition.       Results may be falsely decreased if patient taking Biotin.     BNP (IN-HOUSE) - Abnormal; Notable for the following components:    proBNP 2,084.0 (*)     All other components within normal limits    Narrative:     Among patients with dyspnea, NT-proBNP is highly sensitive for the detection of acute congestive heart failure. In addition NT-proBNP of <300 pg/ml effectively rules out acute congestive heart failure with 99% negative predictive value.    Results may be falsely decreased if patient taking Biotin.     LACTIC ACID, PLASMA - Abnormal; Notable for the following components:    Lactate 2.9 (*)     All other components within normal limits   CBC WITH AUTO DIFFERENTIAL - Abnormal; Notable for the following components:    WBC 10.96 (*)     Hemoglobin 10.7 (*)     Hematocrit 35.3 (*)     MCH 24.1 (*)     MCHC 30.3 (*)     RDW 15.7 (*)     Neutrophil % 85.1 (*)     Lymphocyte % 5.7 (*)     Eosinophil % 0.2 (*)     Neutrophils, Absolute 9.34 (*)     Lymphocytes, Absolute 0.62 (*)     Monocytes, Absolute 0.92 (*)     All other components within normal limits   RESPIRATORY PANEL PCR W/ COVID-19 (SARS-COV-2) CHRIS/ALEXANDRE/OG/PAD/COR/MAD/JAMILA IN-HOUSE, NP SWAB IN UTM/VTP, 3-4 HR TAT - Normal    Narrative:     In the setting of a positive respiratory panel with a viral infection PLUS a negative procalcitonin without other underlying concern for bacterial infection, consider observing off antibiotics or discontinuation of antibiotics and continue supportive care. If the respiratory panel is positive for atypical bacterial infection (Bordetella pertussis, Chlamydophila pneumoniae, or Mycoplasma pneumoniae), consider antibiotic de-escalation to target atypical bacterial infection.   DIGOXIN LEVEL - Normal   BLOOD CULTURE   BLOOD CULTURE   RAINBOW DRAW    Narrative:     The following orders were created for panel order [facility] Draw.  Procedure                                Abnormality         Status                     ---------                               -----------         ------                     Green Top (Gel)[[id number]]                                  Final result               Lavender Top[[id number]]                                     Final result               Light Blue Top[[id number]]                                   Final result                 Please view results for these tests on the individual orders.   URINALYSIS W/ CULTURE IF INDICATED   LACTIC ACID, REFLEX   GREEN TOP   LAVENDER TOP   LIGHT BLUE TOP   CBC AND DIFFERENTIAL    Narrative:     The following orders were created for panel order CBC & Differential.  Procedure                               Abnormality         Status                     ---------                               -----------         ------                     CBC Auto Differential[[id number]]        Abnormal            Final result                 Please view results for these tests on the individual orders.       EKG:   ECG 12 Lead Dyspnea   Preliminary Result   HEART RATE= 116  bpm   RR Interval= 517  ms   DC Interval=   ms   P Horizontal Axis=   deg   P Front Axis=   deg   QRSD Interval= 125  ms   QT Interval= 339  ms   QRS Axis= -49  deg   T Wave Axis= 108  deg   - ABNORMAL ECG -   Atrial fibrillation   Nonspecific IVCD with LAD   LVH with secondary repolarization abnormality   Artifact in lead(s) III,V1,V3 and baseline wander in lead(s) V3   Electronically Signed By:    Date and Time of Study: 2023-01-30 09:38:44          Meds given in ED:   Medications   sodium chloride 0.9 % flush 10 mL (has no administration in time range)   cefTRIAXone (ROCEPHIN) 1 g in sodium chloride 0.9 % 100 mL IVPB-VTB (1 g Intravenous New Bag 1/30/23 1254)   sodium chloride 0.9 % bolus 500 mL (0 mL Intravenous Stopped 1/30/23 1247)     Followed by   sodium chloride 0.9 % infusion (75 mL/hr Intravenous New Bag 1/30/23 1247)   acetaminophen (TYLENOL)  tablet 1,000 mg (1,000 mg Oral Given 23 1030)   levoFLOXacin (LEVAQUIN) 750 mg/150 mL D5W (premix) (LEVAQUIN) 750 mg (0 mg Intravenous Stopped 23 1207)       Imaging results:  XR Chest 1 View    Result Date: 2023  Interval worsening, with increased opacification of the right mid to lower hemithorax.  This report was finalized on 2023 10:22 AM by Dr. Chava Luke M.D.       Ambulatory status:   - UP C ASSIST (uses walker @ baseline)    Social issues:   Social History     Socioeconomic History   • Marital status:    Tobacco Use   • Smoking status: Former     Packs/day: 1.00     Years: 25.00     Pack years: 25.00     Types: Cigarettes     Quit date: 1970     Years since quittin.1     Passive exposure: Never   • Smokeless tobacco: Former   Vaping Use   • Vaping Use: Never used   Substance and Sexual Activity   • Alcohol use: Not Currently     Alcohol/week: 0.0 - 1.0 standard drinks   • Drug use: Never   • Sexual activity: Defer       NIH Stroke Scale:         Angelica Puri RN  23 12:59 EST          Electronically signed by Angelica Puri RN at 23 1300     Angelica Puri RN at 23 1310        Pt given box lunch at this time, per diet order    Electronically signed by Angelica Puri RN at 23 1310       Oxygen Therapy (since admission)     Date/Time SpO2 Device (Oxygen Therapy) Flow (L/min) Oxygen Concentration (%) ETCO2 (mmHg)    23 0723 98 nasal cannula -- -- --    23 0300 97 nasal cannula 2 -- --    23 0047 -- room air -- -- --    23 2342 95 room air -- -- --    23 2047 -- room air -- -- --    23 1912 94 room air -- -- --    23 1340 96 room air -- -- --    23 1125 93 room air -- -- --    23 0921 -- room air -- -- --    23 0740 92 room air -- -- --    23 0016 95 -- -- -- --    23 -- room air -- -- --    23 -- room air -- -- --    23 -- -- -- --    23  1500 -- room air -- -- --    23 1447 100 room air -- -- --    23 1331 97 -- -- -- --    23 1301 96 -- -- -- --    23 1231 95 -- -- -- --    23 1001 98 -- -- -- --    23 0937 95 -- -- -- --    23 0926 92 room air -- -- --        Intake & Output (last 3 days)        07 0700  0701   07 07 07 07 07    P.O.  120 0     I.V. (mL/kg)  81.3 (1.3)      IV Piggyback  650      Total Intake(mL/kg)  851.3 (13.1) 0 (0)     Urine (mL/kg/hr)  850 1285 (0.9) 200 (3.7)    Stool  0 0     Total Output  850 1285 200    Net  +1.3 -1285 -200            Stool Unmeasured Occurrence  0 x 1 x          Lines, Drains & Airways     Active LDAs     Name Placement date Placement time Site Days    Peripheral IV 23 0954 Right Antecubital 23  0954  Antecubital  1                Operative/Procedure Notes (all)    No notes of this type exist for this encounter.            Physician Progress Notes (all)      Grant Ravi Jr., MD at 23 1128                [facility]    Patient Name: LAURA Sequeira  :1935  87 y.o.  LOS: 1  Encounter Provider: Grant Ravi Jr, MD      Patient Care Team:  Celestine English DO as PCP - General (Internal Medicine)  Audra Vazquez RPH as Pharmacist  Vasquez Niño, PharmD as Pharmacist (Pharmacy)  Tatiana Tinoco MD as Consulting Physician (Gastroenterology)    Chief Complaint: Follow-up right lower lobe pneumonia, NSTEMI type II, acute on chronic kidney injury, valvular heart disease status post mechanical AVR with higher target INR, hypertension, persistent atrial fibrillation    Interval History: Increased shortness of air at time of interview.  Patient is just transferred back from Transfer and use the restroom.  Fluid in the fissure on chest x-ray.  Right lower lobe pneumonia on antibiotics.       Objective   Vital Signs  Temp:  [97.8 °F (36.6 °C)-98.2 °F (36.8 °C)] 98 °F (36.7  "°C)  Heart Rate:  [] 104  Resp:  [18] 18  BP: (109-139)/(56-80) 139/62    Intake/Output Summary (Last 24 hours) at 1/31/2023 1128  Last data filed at 1/31/2023 0918  Gross per 24 hour   Intake 851.25 ml   Output 1175 ml   Net -323.75 ml     Flowsheet Rows    Flowsheet Row First Filed Value   Admission Height 182.9 cm (72\") Documented at 01/30/2023 1700   Admission Weight 66.1 kg (145 lb 12.8 oz) Documented at 01/30/2023 1700            Vitals reviewed.   Constitutional:       Appearance: Not in distress. Frail. Chronically ill-appearing.   Neck:      Vascular: No JVR. JVD normal.   Pulmonary:      Effort: Pulmonary effort is normal.      Breath sounds: No wheezing. No rhonchi. Bibasilar Rales present.   Chest:      Chest wall: Not tender to palpatation.   Cardiovascular:      PMI at left midclavicular line. Normal rate. Irregularly irregular rhythm. Normal S1. Normal S2.      Murmurs: There is no murmur.      No gallop. No click. No rub.   Pulses:     Intact distal pulses.   Edema:     Peripheral edema absent.   Abdominal:      General: Bowel sounds are normal.      Palpations: Abdomen is soft.      Tenderness: There is no abdominal tenderness.   Musculoskeletal: Normal range of motion.         General: No tenderness. Skin:     General: Skin is warm and dry.   Neurological:      General: No focal deficit present.      Mental Status: Alert and oriented to person, place and time.           Pertinent Test Results:  Results from last 7 days   Lab Units 01/31/23  0357 01/30/23  0955   SODIUM mmol/L 139 138   POTASSIUM mmol/L 4.5 4.8   CHLORIDE mmol/L 107 104   CO2 mmol/L 21.8* 21.6*   BUN mg/dL 32* 34*   CREATININE mg/dL 1.63* 1.99*   GLUCOSE mg/dL 131* 231*   CALCIUM mg/dL 8.8 9.0   AST (SGOT) U/L  --  23   ALT (SGPT) U/L  --  19     Results from last 7 days   Lab Units 01/30/23  0955   TROPONIN T ng/mL 0.041*     Results from last 7 days   Lab Units 01/31/23  0357 01/30/23  0955   WBC 10*3/mm3 7.16 10.96* "   HEMOGLOBIN g/dL 10.1* 10.7*   HEMATOCRIT % 34.2* 35.3*   PLATELETS 10*3/mm3 154 140     Results from last 7 days   Lab Units 01/31/23  0357 01/30/23  0955 01/27/23  0000   INR  4.34* 3.48* 3.40   APTT seconds  --  69.5*  --      Results from last 7 days   Lab Units 01/31/23  0357   MAGNESIUM mg/dL 2.6*           Invalid input(s): LDLCALC  Results from last 7 days   Lab Units 01/30/23  0955   PROBNP pg/mL 2,084.0*               Medication Review:   atorvastatin, 40 mg, Oral, Daily  cefTRIAXone, 1 g, Intravenous, Q24H  digoxin, 62.5 mcg, Oral, Daily  famotidine, 20 mg, Oral, Daily  ferrous sulfate, 325 mg, Oral, Every Other Day  guaiFENesin, 1,200 mg, Oral, Q12H  magnesium oxide, 400 mg, Oral, BID  metoprolol succinate XL, 12.5 mg, Oral, Daily  mupirocin, 1 application, Topical, Q12H  sodium chloride, 10 mL, Intravenous, Q12H         Pharmacy to dose warfarin,         Assessment & Plan     Active Hospital Problems    Diagnosis  POA   • **Pneumonia of right lower lobe due to infectious organism [J18.9]  Yes   • Supratherapeutic INR [R79.1]  Unknown   • History of CVA (cerebrovascular accident) [Z86.73]  Not Applicable   • Anemia [D64.9]  Yes   • History of Clostridium difficile infection [Z86.19]  Yes   • Chronic anticoagulation [Z79.01]  Not Applicable   • BYRON (acute kidney injury) (HCC) [N17.9]  Yes   • Stage 3b chronic kidney disease (HCC) [N18.32]  Yes   • History of aortic valve replacement with metallic valve [Z95.4]  Not Applicable   • Type 2 diabetes mellitus (HCC) [E11.9]  Yes   • Hypertension [I10]  Yes   • Atrial fibrillation (HCC) [I48.91]  Yes      Resolved Hospital Problems   No resolved problems to display.        1. Pneumonia -I have asked the nursing staff to put him back on oxygen supplementation as he is struggling a little bit but he just got back from echocardiogram and he was trying to use the restroom.  There is definitely some fluid in the right fissure.  We will stop fluids and start low-dose  oral diuretic.  Antibiotics per internal medicine  2. Valvular heart disease -status post mechanical AVR.  Pharmacy managing Coumadin dosing and INR.  Echo recently completed.  We will review for analysis  3. NSTEMI type II -in the setting of pneumonia, CKD and atrial fibrillation with RVR.  No angina.  No ischemic changes on EKG.  4. CKD -creatinine is back down to what appears to be potentially new baseline?  Consider nephrology evaluation given the fact the patient had normal creatinine in May 2022.  5. Persistent atrial fibrillation -on warfarin with pharmacy dosing.  Heart rate is better controlled on very low-dose Toprol.  Will increase dose starting tomorrow morning.  6. Hypertension  7. Diabetes  8. History of stroke  9. 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 male  ROOM[facility]     Subjective   Subjective   Volume overloaded when returning from echo with increased SOB. He denies any complaints Cardiology stopped IVF and started lasix. No chest pain fever or chills. No palpitations. No N/V/D    Review of Systems    Objective   Objective   Vital Signs  Temp:  [97.8 °F (36.6 °C)-98.2 °F (36.8 °C)] 98 °F (36.7 °C)  Heart Rate:  [] 112  Resp:  [18] 18  BP: (109-139)/(56-80) 139/62  SpO2:  [92 %-100 %] 92 %  on   ;   Device (Oxygen Therapy): room air  Body mass index is 19.41 kg/m².  Physical Exam  Vitals reviewed.   Constitutional:       Appearance: He is well-developed. He is ill-appearing.   HENT:      Head: Normocephalic and atraumatic.   Cardiovascular:      Rate and Rhythm: Tachycardia present. Rhythm irregular.      Comments: Irregularly irregular  Pulmonary:       Breath sounds: Rales present.   Abdominal:      General: Bowel sounds are normal. There is no distension.      Palpations: Abdomen is soft.      Tenderness: There is no abdominal tenderness.   Musculoskeletal:      Right lower leg: No edema.      Left lower leg: No edema.   Skin:     General: Skin is warm and dry.   Neurological:      Mental Status: He is alert and oriented to person, place, and time.   Psychiatric:         Behavior: Behavior normal.         Thought Content: Thought content normal.         Judgment: Judgment normal.       Results Review     I reviewed the patient's new clinical results.  Results from last 7 days   Lab Units 01/31/23  0357 01/30/23  0955   WBC 10*3/mm3 7.16 10.96*   HEMOGLOBIN g/dL 10.1* 10.7*   PLATELETS 10*3/mm3 154 140     Results from last 7 days   Lab Units 01/31/23  0357 01/30/23  0955   SODIUM mmol/L 139 138   POTASSIUM mmol/L 4.5 4.8   CHLORIDE mmol/L 107 104   CO2 mmol/L 21.8* 21.6*   BUN mg/dL 32* 34*   CREATININE mg/dL 1.63* 1.99*   GLUCOSE mg/dL 131* 231*   EGFR mL/min/1.73 41.0* 32.3*     Results from last 7 days   Lab Units 01/30/23  0955   ALBUMIN g/dL 3.4*   BILIRUBIN mg/dL 0.9   ALK PHOS U/L 124*   AST (SGOT) U/L 23   ALT (SGPT) U/L 19     Results from last 7 days   Lab Units 01/31/23  0357 01/30/23  0955   CALCIUM mg/dL 8.8 9.0   ALBUMIN g/dL  --  3.4*   MAGNESIUM mg/dL 2.6*  --    PHOSPHORUS mg/dL 2.6  --      Results from last 7 days   Lab Units 01/30/23  1248 01/30/23  0955   PROCALCITONIN ng/mL  --  1.40*   LACTATE mmol/L 1.2 2.9*     Lab Results   Lab Value Date/Time    TROPONINT 0.041 (C) 01/30/2023 0955    TROPONINT 0.030 04/06/2022 0002    TROPONINT 0.018 03/30/2022 1625    TROPONINT 0.027 02/18/2022 2305    TROPONINT 0.011 10/23/2021 1617    TROPONINT 0.017 07/15/2021 2029    TROPONINT <0.010 01/13/2020 0747    TROPONINT <0.010 03/30/2017 0809       Glucose   Date/Time Value Ref Range Status   01/31/2023 0611 118 70 - 130 mg/dL Final     Comment:     Meter:  [id number] : [id number] Henri Jimenez CNA   01/30/2023 2123 146 (H) 70 - 130 mg/dL Final     Comment:     Meter: [id number] : [id number] Henri Jimenez CNA   01/30/2023 1701 151 (H) 70 - 130 mg/dL Final     Comment:     Meter: [id number] : [id number] Rafael Hyde NA       XR Chest 1 View    Result Date: 1/30/2023  Interval worsening, with increased opacification of the right mid to lower hemithorax.  This report was finalized on 1/30/2023 10:22 AM by Dr. Chava Luke M.D.      I have personally reviewed all medications:  Scheduled Medications  atorvastatin, 40 mg, Oral, Daily  cefTRIAXone, 1 g, Intravenous, Q24H  digoxin, 62.5 mcg, Oral, Daily  famotidine, 20 mg, Oral, Daily  ferrous sulfate, 325 mg, Oral, Every Other Day  guaiFENesin, 1,200 mg, Oral, Q12H  magnesium oxide, 400 mg, Oral, BID  metoprolol succinate XL, 12.5 mg, Oral, Daily  mupirocin, 1 application, Topical, Q12H  sodium chloride, 10 mL, Intravenous, Q12H    Infusions  Pharmacy to dose warfarin,     Diet  Diet: Diabetic Diets; Consistent Carbohydrate; Texture: Regular Texture (IDDSI 7); Fluid Consistency: Thin (IDDSI 0)    I have personally reviewed:  [x]  Laboratory   [x]  Microbiology   [x]  Radiology   [x]  EKG/Telemetry  [x]  Cardiology/Vascular   [x]  Pathology    [x]  Records      Assessment/Plan     Active Hospital Problems    Diagnosis  POA   • **Pneumonia of right lower lobe due to infectious organism [J18.9]  Yes   • Supratherapeutic INR [R79.1]  Unknown   • History of CVA (cerebrovascular accident) [Z86.73]  Not Applicable   • Anemia [D64.9]  Yes   • History of Clostridium difficile infection [Z86.19]  Yes   • Chronic anticoagulation [Z79.01]  Not Applicable   • BYRON (acute kidney injury) (HCC) [N17.9]  Yes   • Stage 3b chronic kidney disease (HCC) [N18.32]  Yes   • History of aortic valve replacement with metallic valve [Z95.4]  Not Applicable   • Type 2 diabetes mellitus (HCC) [E11.9]  Yes   • Hypertension [I10]  Yes    • Atrial fibrillation (HCC) [I48.91]  Yes      Resolved Hospital Problems   No resolved problems to display.       87 y.o. male admitted with Pneumonia of right lower lobe due to infectious organism.        Mr. Sequeira is a 87 y.o. admitted with pneumonia of the right lower lobe.  Oxygen saturations stable on room air.  He has noted to have atrial fibrillation with RVR on admission and elevated troponin level with BYRON.     • Pneumonia right lower lobe  Continue IV Rocephin.    Await SLP evaluation. Supportive care with antitussive and mucolytic agent.  Leukocytosis resolved.  Afebrile overnight.,     chest x-ray with fluid in right fissure, oral diuretics started and fluids held per cardiology     • NSTEMI type II / elevated troponin level  No complaints of chest pain.  Suspect secondary to BYRON on CKD Appreciate cardiology.  No ischemic change on EKG.  No chest pain.    • CKD 3B  Creatinine elevated on admission likely due to poor p.o. intake overthe weekend.    He is now volume overloaded but renal function looks better.  Monitor with gentle diuresis     • Medical aortic valve  INR goal 3-3.5 on Coumadin.  Currently supratherapeutic.  Hold Coumadin.     • Atrial fibrillation  HR low 100s Cardiology following.  Now on low-dose Toprol     • History of CVA     • HTN  BP stable continue home regimen.     · Impaired mobility  PT recommends home health       • I discussed the patient's findings and my recommendations with patient, family, nursing staff and ED provider.     VTE Prophylaxis -holding warfarin (home med) while supratherapeutic  Code Status - Full code.     ·       LIZA Rizvi  [facility]  01/31/23  11:03 EST      Electronically signed by Latrice Barfield APRN at 01/31/23 1418          Consult Notes (all)      Iza Mckeon APRN at 01/30/23 1454      Consult Orders    1. LCG (on-call MD unless specified) [[id number]] ordered by Vasquez Mclean MD at 01/30/23 0348                    Patient Name: Betito Sequeira  :1935  87 y.o.    Date of Admission: 2023  Date of Consultation:  23  Encounter Provider: LIZA Krishnamurthy  Place of Service: [facility] CARDIOLOGY  Referring Provider: Bruce Mitchell MD  Patient Care Team:  Celestine English DO as PCP - General (Internal Medicine)  Audra Vazquez RPH as Pharmacist  Vasquez Niño PharmD as Pharmacist (Pharmacy)  Tatiana Tinoco MD as Consulting Physician (Gastroenterology)      Chief complaint: cough    History of Present Illness: Mr. Sequeira is an 87 year old man followed by Dr. Fried for mechanical aortic valve. He had a stroke with an INR of 2.2 so his target INR has been adjusted to 3-3.5. he has atrial fibrillation, chronic kidney disease, hypertension, diabetes mellitus type 2. He has a history of GIB from AVM. He was admitted in 2021 and had his gallbladder removed. He had a pleural effusion and had a thoracentesis with 1700 mL drained. He was admitted in April with left iliopsoas muscle hematoma which was medically managed. He was discharged to [facility] and released to home on May 18th. He predominantly uses a wheel chair at home. He saw Dr. Fried in August and was doing well from a cardiac standpoint.     He started not feeling well over the weekend. He had chills then subsequent cough. He went to urgent care however was referred to the emergency room where he has been diagnosed with pneumonia. His troponin is elevated (0.041). he also has acute kidney injury with creatinine of 1.99.    He has not had any chest pain or pressure. No bleeding problems. No syncope or near syncope.     Echo 10/24/2021  • Estimated left ventricular EF = 60% Left ventricular systolic function is normal.  • Left ventricular diastolic function was indeterminate.  • The right ventricular cavity is mild to moderately dilated.  • Left atrial volume is moderately  increased.  • The right atrial cavity is moderately dilated.  • There is a mechanical aortic valve present. The aortic valve peak and mean gradients are within defined limits.  • Moderate tricuspid valve regurgitation is present.  • Severe pulmonary hypertension is present. Calculated right ventricular systolic pressure from tricuspid regurgitation is 63.7 mmHg.        Past Medical History:   Diagnosis Date   • Allergic rhinitis    • Aortic valve insufficiency    • Ascending aortic aneurysm    • Aspiration pneumonia (HCC)    • Atrial fibrillation (HCC)    • Bacteremia    • Calcific aortic stenosis of bicuspid valve    • Cardiac arrest (HCC)    • Cataracts, bilateral    • CKD (chronic kidney disease) stage 3, GFR 30-59 ml/min (HCC)    • Contact dermatitis due to poison ivy    • Elbow fracture    • GERD (gastroesophageal reflux disease)    • GI bleed     2021; s/p Colonoscopy and EGD   • H/O mechanical aortic valve replacement    • Head injury    • History of transfusion    • Hyperlipidemia    • Hypertension    • Kidney carcinoma (HCC)    • 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: follows with [facility] for management   • Nonischemic cardiomyopathy (HCC)    • Prostate cancer (HCC)    • Renal oncocytoma    • Stroke (cerebrum) (HCC)    • Type 2 diabetes mellitus (HCC)    • Visual field defect        Past Surgical History:   Procedure Laterality Date   • AORTIC VALVE REPAIR/REPLACEMENT     • ASCENDING AORTIC ANEURYSM REPAIR W/ MECHANICAL AORTIC VALVE REPLACEMENT     • CHOLECYSTECTOMY WITH INTRAOPERATIVE CHOLANGIOGRAM N/A 03/29/2022    Procedure: Laparoscopic cholecystectomy with cholangiogram, possible open;  Surgeon: Lisa Guidry MD;  Location: [facility] MAIN OR;  Service: General;  Laterality: N/A;   • COLONOSCOPY N/A 10/28/2021    Procedure: COLONOSCOPY to cecum:  cold snare polyps,;  Surgeon: Dinesh Meza MD;  Location: [facility] ENDOSCOPY;  Service:  Gastroenterology;  Laterality: N/A;  pre:  Iron deficiency anemia  post:  polyps, diverticulosis,    • COLONOSCOPY N/A 11/09/2021    Procedure: COLONOSCOPY to cecum with APC cautery of AVM and clip placement x1;  Surgeon: Pavan Rodriguez MD;  Location:  [facility];  Service: Gastroenterology;  Laterality: N/A;  PRE - gi bleed, anemia  POST - diverticulosis, poor prep, AVM right colon   • ENDOSCOPY N/A 10/28/2021    Procedure: ESOPHAGOGASTRODUODENOSCOPY with biopsies;  Surgeon: Dinesh Meza MD;  Location:  [facility];  Service: Gastroenterology;  Laterality: N/A;  pre:  Iron deficiency anemia  post:  duodenitis and gastritis   • EYE SURGERY  12/09/2020    cataract surgery    • NEPHRECTOMY     • OTHER SURGICAL HISTORY      elbow surgery   • OTHER SURGICAL HISTORY      right arm surgery   • PROSTATE SURGERY      prostate removal   • THORACENTESIS Left     diagnostic         Prior to Admission medications    Medication Sig Start Date End Date Taking? Authorizing Provider   acetaminophen (TYLENOL) 325 MG tablet Take 2 tablets by mouth Every 6 (Six) Hours As Needed for Mild Pain . 5/18/22  Yes Grant Andrade MD   atorvastatin (LIPITOR) 40 MG tablet TAKE ONE TABLET BY MOUTH DAILY  Patient taking differently: Take 40 mg by mouth Daily. 10/31/22  Yes Celestine English DO   Digoxin 62.5 MCG tablet Take 62.5 mcg by mouth Daily. 7/5/22  Yes Lamar Milan APRN   diphenhydrAMINE (BENADRYL) 25 mg capsule Take 1 capsule by mouth 2 (Two) Times a Day As Needed for Itching, Allergies or Sleep. 11/16/21  Yes Barbra Patino APRN   famotidine (PEPCID) 20 MG tablet Take 1 tablet by mouth Daily. 8/26/22  Yes Celestine English DO   ferrous gluconate (FERGON) 324 MG tablet TAKE ONE TABLET BY MOUTH EVERY OTHER DAY  Patient taking differently: Take 324 mg by mouth Daily With Breakfast. 12/13/22  Yes Celestine English DO   Insulin Disposable Pump (V-Go 40) kit Wear each V-Go for one 24-hour period. At the end of  the 24-hour period, retract the needle by sliding and then pushing the Needle Release Button and remove the V-Go from your body 22  Yes Celestine English DO   insulin lispro (humaLOG) 100 UNIT/ML injection Inject  under the skin into the appropriate area as directed Take As Directed. Use as directed with V-Go Pump--NOT TAKING DUE TO LOW BG LEVELS SINCE HOSPITALIZATION  22  Yes Tristen Ortega MD   magnesium oxide (MAG-OX) 400 MG tablet Take 400 mg by mouth 2 (Two) Times a Day.   Yes ProviderTristen MD   metoprolol succinate XL (TOPROL-XL) 25 MG 24 hr tablet Take 0.5 tablets by mouth Daily for 90 days. 22 Yes Grant Andrade MD   warfarin (COUMADIN) 5 MG tablet Take one tablet by mouth on thurs and take one and one-half tablet by mouth all other days  Patient taking differently: Take 5 mg by mouth See Admin Instructions. Takes 5 mg once weekly on 22  Yes Griffin Fried MD   warfarin (COUMADIN) 5 MG tablet Take 7.5 mg by mouth See Admin Instructions. Takes 1.5 tablets (7.5 mg) On Sundays, , , ,  and    Yes ProviderTristen MD   Continuous Blood Gluc Sensor (FreeStyle Vance 14 Day Sensor) misc 1 application Every 14 (Fourteen) Days. 22  Celestine English DO   Insulin Lispro (humaLOG) 100 UNIT/ML injection USE AS DIRECTED WITH V-GO PUMP 11/15/22 1/30/23  Celestine English DO       Allergies   Allergen Reactions   • Penicillins Swelling   • Oxycodone-Acetaminophen Nausea And Vomiting   • Other Other (See Comments)     Grass, ragweed   • Percocet [Oxycodone-Acetaminophen] Nausea And Vomiting       Social History     Socioeconomic History   • Marital status:    Tobacco Use   • Smoking status: Former     Packs/day: 1.00     Years: 25.00     Pack years: 25.00     Types: Cigarettes     Quit date: 1970     Years since quittin.1     Passive exposure: Never   • Smokeless tobacco: Former   Vaping Use   • Vaping  Use: Never used   Substance and Sexual Activity   • Alcohol use: Not Currently     Alcohol/week: 0.0 - 1.0 standard drinks   • Drug use: Never   • Sexual activity: Defer       Family History   Problem Relation Age of Onset   • Cancer Mother         colon   • Cancer Brother         colon       REVIEW OF SYSTEMS:   All systems reviewed.  Pertinent positives identified in HPI.  All other systems are negative.      Objective:     Vitals:    01/30/23 1251 01/30/23 1301 01/30/23 1331 01/30/23 1447   BP:  109/63 122/56    Pulse:  86 77 80   Resp:    18   Temp: 98.2 °F (36.8 °C)   97.9 °F (36.6 °C)   TempSrc: Oral   Oral   SpO2:  96% 97% 100%     There is no height or weight on file to calculate BMI.    Physical Exam:  Constitutional: He is oriented to person, place, and time. He appears well-developed. He does not appear ill.   HENT:   Head: Normocephalic and atraumatic. Head is without contusion.   Right Ear: Hearing normal. No drainage.   Left Ear: Hearing normal. No drainage.   Nose: No nasal deformity. No epistaxis.   Eyes: Lids are normal. Right eye exhibits no exudate. Left eye exhibits no exudate.  Neck: No JVD present. Carotid bruit is not present. No tracheal deviation present. No thyroid mass and no thyromegaly present.   Cardiovascular: Normal rate, regular rhythm and mechanical click    Pulses:       Posterior tibial pulses are 2+ on the right side, and 2+ on the left side.   Pulmonary/Chest: Effort normal and breath sounds normal.   Abdominal: Soft. Normal appearance and bowel sounds are normal. There is no tenderness.   Musculoskeletal: Normal range of motion.        Right shoulder: He exhibits no deformity.        Left shoulder: He exhibits no deformity.   Neurological: He is alert and oriented to person, place, and time. He has normal strength.   Skin: Skin is warm, dry and intact. No rash noted.   Psychiatric: He has a normal mood and affect. His behavior is normal. Thought content normal.   Vitals  reviewed      Lab Review:     Results from last 7 days   Lab Units 01/30/23  0955   SODIUM mmol/L 138   POTASSIUM mmol/L 4.8   CHLORIDE mmol/L 104   CO2 mmol/L 21.6*   BUN mg/dL 34*   CREATININE mg/dL 1.99*   CALCIUM mg/dL 9.0   BILIRUBIN mg/dL 0.9   ALK PHOS U/L 124*   ALT (SGPT) U/L 19   AST (SGOT) U/L 23   GLUCOSE mg/dL 231*     Results from last 7 days   Lab Units 01/30/23  0955   TROPONIN T ng/mL 0.041*     Results from last 7 days   Lab Units 01/30/23  0955   WBC 10*3/mm3 10.96*   HEMOGLOBIN g/dL 10.7*   HEMATOCRIT % 35.3*   PLATELETS 10*3/mm3 140     Results from last 7 days   Lab Units 01/30/23  0955 01/27/23  0000   INR  3.48* 3.40   APTT seconds 69.5*  --            Current Facility-Administered Medications:   •  cefTRIAXone (ROCEPHIN) 1 g in sodium chloride 0.9 % 100 mL IVPB-VTB, 1 g, Intravenous, Q24H, Bruce Mitchell MD, Last Rate: 200 mL/hr at 01/30/23 1254, 1 g at 01/30/23 1254  •  [COMPLETED] Insert Peripheral IV, , , Once **AND** sodium chloride 0.9 % flush 10 mL, 10 mL, Intravenous, PRN, Vasquez Mclean MD  •  [COMPLETED] sodium chloride 0.9 % bolus 500 mL, 500 mL, Intravenous, Once, Stopped at 01/30/23 1247 **FOLLOWED BY** sodium chloride 0.9 % infusion, 75 mL/hr, Intravenous, Continuous, Bruce Mitchell MD, Last Rate: 75 mL/hr at 01/30/23 1247, 75 mL/hr at 01/30/23 1247    Assessment and Plan:       Active Hospital Problems    Diagnosis  POA   • **Pneumonia of right lower lobe due to infectious organism [J18.9]  Yes   • History of CVA (cerebrovascular accident) [Z86.73]  Not Applicable   • Anemia [D64.9]  Yes   • History of Clostridium difficile infection [Z86.19]  Yes   • Chronic anticoagulation [Z79.01]  Not Applicable   • BYRON (acute kidney injury) (HCC) [N17.9]  Yes   • Stage 3b chronic kidney disease (HCC) [N18.32]  Yes   • History of aortic valve replacement with metallic valve [Z95.4]  Not Applicable   • Type 2 diabetes mellitus (HCC) [E11.9]  Yes   • Hypertension [I10]  Yes   •  Atrial fibrillation (HCC) [I48.91]  Yes      Resolved Hospital Problems   No resolved problems to display.     1. Pneumonia of the right lower lobe - on antibiotics  2. Elevated troponin , likely secondary to renal dysfunction. Do not suspect ACS. Will trend trops and check echocardiogram to assess LV function and wall motion.   3. Acute kidney injury on chronic kidney disease  4. 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 Target INR 3-3.5 due to prior stroke with therapeutic INR.  5. Hypertension  6. Persistent atrial fibrillation - reasonable rate control given clinical scenario.   7. Diabetes mellitus type II  8. History of stroke  9. 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

## 2023-02-01 NOTE — MBS/VFSS/FEES
Acute Care - Speech Language Pathology   Swallow Initial Evaluation [facility]     Patient Name: LAURA Sequeira  : 1935  MRN: [id number]  Today's Date: 2023               Admit Date: 2023    Visit Dx:     ICD-10-CM ICD-9-CM   1. Pneumonia of right lower lobe due to infectious organism  J18.9 486   2. Sepsis, due to unspecified organism, unspecified whether acute organ dysfunction present (HCC)  A41.9 038.9     995.91   3. Acute renal failure superimposed on chronic kidney disease, unspecified CKD stage, unspecified acute renal failure type (HCC)  N17.9 584.9    N18.9 585.9   4. Hyperglycemia due to diabetes mellitus (HCC)  E11.65 250.02   5. 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)   • Iron deficiency anemia   • Chronic anticoagulation   • Hemiparesis of left nondominant side as late effect of cerebral infarction (HCC)   • Rectus sheath hematoma   • Hospital discharge follow-up   • Sepsis (HCC)   • Calculus of gallbladder with acute cholecystitis without obstruction   • History of Clostridium difficile infection   • Aspiration pneumonitis (HCC)   • Hematoma of iliopsoas muscle, left, initial encounter   • History of CVA (cerebrovascular accident)   • S/P laparoscopic cholecystectomy   • Anemia   • Hematoma of left iliopsoas muscle   • Pneumonia of right lower lobe due to infectious organism   • Supratherapeutic INR     Past Medical History:   Diagnosis Date   • Allergic rhinitis    • Aortic valve insufficiency    • Ascending  aortic aneurysm    • Aspiration pneumonia (HCC)    • Atrial fibrillation (HCC)    • Bacteremia    • Calcific aortic stenosis of bicuspid valve    • Cardiac arrest (HCC)    • Cataracts, bilateral    • CKD (chronic kidney disease) stage 3, GFR 30-59 ml/min (HCC)    • Contact dermatitis due to poison ivy    • Elbow fracture    • GERD (gastroesophageal reflux disease)    • GI bleed     2021; s/p Colonoscopy and EGD   • H/O mechanical aortic valve replacement    • Head injury    • History of transfusion    • Hyperlipidemia    • Hypertension    • Kidney carcinoma (HCC)    • 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: follows with [facility] for management   • Nonischemic cardiomyopathy (HCC)    • Prostate cancer (HCC)    • Renal oncocytoma    • Stroke (cerebrum) (HCC)    • Type 2 diabetes mellitus (HCC)    • Visual field defect      Past Surgical History:   Procedure Laterality Date   • AORTIC VALVE REPAIR/REPLACEMENT     • ASCENDING AORTIC ANEURYSM REPAIR W/ MECHANICAL AORTIC VALVE REPLACEMENT     • CHOLECYSTECTOMY WITH INTRAOPERATIVE CHOLANGIOGRAM N/A 03/29/2022    Procedure: Laparoscopic cholecystectomy with cholangiogram, possible open;  Surgeon: Lisa Guidry MD;  Location: [facility] MAIN OR;  Service: General;  Laterality: N/A;   • COLONOSCOPY N/A 10/28/2021    Procedure: COLONOSCOPY to cecum:  cold snare polyps,;  Surgeon: Dinesh Meza MD;  Location: [facility] ENDOSCOPY;  Service: Gastroenterology;  Laterality: N/A;  pre:  Iron deficiency anemia  post:  polyps, diverticulosis,    • COLONOSCOPY N/A 11/09/2021    Procedure: COLONOSCOPY to cecum with APC cautery of AVM and clip placement x1;  Surgeon: Pavan Rodriguez MD;  Location: [facility] ENDOSCOPY;  Service: Gastroenterology;  Laterality: N/A;  PRE - gi bleed, anemia  POST - diverticulosis, poor prep, AVM right colon   • ENDOSCOPY N/A 10/28/2021    Procedure: ESOPHAGOGASTRODUODENOSCOPY with biopsies;  Surgeon:  Dinesh Meza MD;  Location: [facility] ENDOSCOPY;  Service: Gastroenterology;  Laterality: N/A;  pre:  Iron deficiency anemia  post:  duodenitis and gastritis   • EYE SURGERY  12/09/2020    cataract surgery    • NEPHRECTOMY     • OTHER SURGICAL HISTORY      elbow surgery   • OTHER SURGICAL HISTORY      right arm surgery   • PROSTATE SURGERY      prostate removal   • THORACENTESIS Left     diagnostic       SLP Recommendation and Plan  SLP Swallowing Diagnosis: moderate, oral dysphagia, pharyngeal dysphagia (02/01/23 1500)  SLP Diet Recommendation: soft to chew textures, chopped, no mixed consistencies, nectar thick liquids, ice chips between meals after oral care, with supervision, water between meals after oral care, with supervision (02/01/23 1500)  Recommended Precautions and Strategies: upright posture during/after eating, small bites of food and sips of liquid, no straw, multiple swallows per bite of food, multiple swallows per sip of liquid (02/01/23 1500)  SLP Rec. for Method of Medication Administration: meds whole, meds crushed, with puree, as tolerated (02/01/23 1500)     Monitor for Signs of Aspiration: yes (02/01/23 1500)  Recommended Diagnostics: VFSS ([facility] [place] [facility]) (01/31/23 1600)  Swallow Criteria for Skilled Therapeutic Interventions Met: demonstrates skilled criteria (02/01/23 1500)  Anticipated Discharge Disposition (SLP): unknown, anticipate therapy at next level of care (02/01/23 1500)  Rehab Potential/Prognosis, Swallowing: good, to achieve stated therapy goals (02/01/23 1500)  Therapy Frequency (Swallow): PRN (02/01/23 1500)  Predicted Duration Therapy Intervention (Days): until discharge (02/01/23 1500)                                        Plan of Care Reviewed With: patient  Outcome Evaluation: VFSS completed. Recommend soft, chopped w/ NTL; meds w/ pureed (whole/crushed); upright for meals and 30 min after; slow rate; small bites/sips; double swallow. ST to follow. Recommend swallow therapy at  next level of care.      SWALLOW EVALUATION (last 72 hours)     SLP Adult Swallow Evaluation     Row Name 02/01/23 1500 01/31/23 1600                Rehab Evaluation    Document Type evaluation  -AW evaluation  -AW       Subjective Information no complaints  -AW no complaints  -AW       Patient Observations alert;agree to therapy;cooperative  -AW alert;cooperative;agree to therapy  -AW       Patient/Family/Caregiver Comments/Observations -- Wife present.  -AW       Patient Effort good  -AW good  -AW       Symptoms Noted During/After Treatment none  -AW none  -AW          General Information    Patient Profile Reviewed yes  -AW yes  -AW       Pertinent History Of Current Problem Pt admitted with RLL PNA; h/o CVA and dysphagia  -AW Pt admitted with RLL PNA; h/o CVA and dysphagia  -AW       Current Method of Nutrition regular textures;thin liquids  -AW regular textures;thin liquids  -AW       Precautions/Limitations, Vision WFL;for purposes of eval  -AW WFL;for purposes of eval  -AW       Precautions/Limitations, Hearing WFL;for purposes of eval  -AW WFL;for purposes of eval  -AW       Prior Level of Function-Communication unknown  -AW unknown  -AW       Prior Level of Function-Swallowing no diet consistency restrictions;compensations (maneuvers, postures) needed;other (see comments)  h/o dysphagia  -AW no diet consistency restrictions;other (see comments);compensations (maneuvers, postures) needed  h/o dysphagia; VFSS 11/12/21 showed trace asp w/ all recommended soft no mixed and thin  -AW       Plans/Goals Discussed with patient;agreed upon  -AW patient;spouse/S.O.;agreed upon  -AW       Barriers to Rehab medically complex;previous functional deficit  -AW previous functional deficit  -AW       Patient's Goals for Discharge return home  -AW return home  -AW       Family Goals for Discharge -- family did not state  -AW          Pain    Additional Documentation Pain Scale: Numbers Pre/Post-Treatment (Group)  -AW Pain  Scale: Numbers Pre/Post-Treatment (Group)  -AW          Pain Scale: Numbers Pre/Post-Treatment    Pretreatment Pain Rating 0/10 - no pain  -AW 0/10 - no pain  -AW       Posttreatment Pain Rating 0/10 - no pain  -AW 0/10 - no pain  -AW          Oral Motor Structure and Function    Dentition Assessment natural, present and adequate  -AW natural, present and adequate  -AW       Secretion Management WNL/WFL  -AW WNL/WFL  -AW       Mucosal Quality moist, healthy  -AW moist, healthy  -AW          Oral Musculature and Cranial Nerve Assessment    Oral Motor General Assessment WFL  -AW WFL  -AW          General Eating/Swallowing Observations    Respiratory Support Currently in Use room air  -AW room air  -AW       Eating/Swallowing Skills self-fed;fed by SLP  -AW self-fed;fed by SLP  -AW       Positioning During Eating upright in bed  -AW upright in bed  -AW       Utensils Used -- spoon;cup  -AW       Consistencies Trialed -- thin liquids;pureed;regular textures;soft to chew textures;mixed consistency  -AW          Clinical Swallow Eval    Clinical Swallow Evaluation Summary -- Swallow eval completed. Pt took trials of thin (cup), pureed, soft, mixed, and regular. No overt s/s; delayed cough x1. Pt and wife are aware of safe swallow strategies due to h/o dysphagia. Pt's wife stated he eats too fast. Laryngeal elevation was adequate with palpation. REC continue with regular and thin; meds in pureed; upright for meals and 30 min after; slow rate; small bites/sips; no straws.  Given current RLL PNA, recommend VFSS to further assess swallow. If pt is discharged prior to VFSS, it can be scheduled as an outpatient.  -AW          MBS/VFSS    Utensils Used spoon;cup  -AW --       Consistencies Trialed regular textures;soft to chew textures;mixed consistency;pureed;thin liquids;nectar/syrup-thick liquids;honey-thick liquids  -AW --          MBS/VFSS Interpretation    VFSS Summary VFSS completed with Dr. Jorge. Pt exhibited  moderate oropharyngeal dysphagia characterized by swallow delay, decreased tongue base strength, decreased hyolaryngeal excursion, decreased epiglottic deflection, decreased airway closure, and decreased pharyngeal constriction. Pt had shadowing noted in the laryngeal vestibule. With cup sip of thin, silent penetration was noted during the swallow with silent aspiration after the swallow. Pt's cued cough did not clear aspirated material. Pt tolerated nectar thick (cup), pureed, and soft solids with no penetration or aspiration. Mild to moderate diffuse pharyngeal residuals were noted increasing risk of penetration/aspiration. Pt needed a cue to swallow again and assist with clearing. Pt had difficulty clearing dry/regular residuals from the valleculae. A honey thick wash resulted in penetration. Pt had an anterior buldging just below the cricopharyngeus (CP) at C5-7, question osteophyte versus soft tissue change. 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 This resulted in residue in the upper esophagus with no backflow noted through the CP.  -AW --          SLP Communication to Radiology    Summary Statement VFSS completed with Dr. Jorge. Pt exhibited moderate oropharyngeal dysphagia characterized by swallow delay, decreased tongue base strength, decreased hyolaryngeal excursion, decreased epiglottic deflection, decreased airway closure, and decreased pharyngeal constriction. Pt had shadowing noted in the laryngeal vestibule. With cup sip of thin, silent penetration was noted during the swallow with silent aspiration after the swallow. Pt's cued cough did not clear aspirated material. Pt tolerated nectar thick (cup), pureed, and soft solids with no penetration or aspiration. Mild to moderate diffuse pharyngeal residuals were noted increasing risk of penetration/aspiration. Pt needed a cue to swallow again and assist with clearing. Pt had difficulty clearing  dry/regular residuals from the valleculae. A honey thick wash resulted in penetration. Pt had an anterior buldging just below the cricopharyngeus (CP) at C5-7, question osteophyte versus soft tissue change. 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 This resulted in residue in the upper esophagus with no backflow noted through the CP.  -AW --          SLP Evaluation Clinical Impression    SLP Swallowing Diagnosis moderate;oral dysphagia;pharyngeal dysphagia  -AW suspected pharyngeal dysphagia  -AW       Functional Impact risk of aspiration/pneumonia  -AW risk of aspiration/pneumonia  -AW       Rehab Potential/Prognosis, Swallowing good, to achieve stated therapy goals  -AW good, to achieve stated therapy goals  -AW       Swallow Criteria for Skilled Therapeutic Interventions Met demonstrates skilled criteria  -AW demonstrates skilled criteria  -AW          Recommendations    Therapy Frequency (Swallow) PRN  -AW PRN  -AW       Predicted Duration Therapy Intervention (Days) until discharge  -AW until discharge  -AW       SLP Diet Recommendation soft to chew textures;chopped;no mixed consistencies;nectar thick liquids;ice chips between meals after oral care, with supervision;water between meals after oral care, with supervision  -AW regular textures;thin liquids  -AW       Recommended Diagnostics -- VFSS (MBS)  -AW       Recommended Precautions and Strategies upright posture during/after eating;small bites of food and sips of liquid;no straw;multiple swallows per bite of food;multiple swallows per sip of liquid  -AW upright posture during/after eating;small bites of food and sips of liquid;no straw  -AW       Oral Care Recommendations Oral Care before breakfast, after meals and PRN  -AW Oral Care BID/PRN  -AW       SLP Rec. for Method of Medication Administration meds whole;meds crushed;with puree;as tolerated  -AW meds whole;with puree  -AW       Monitor for  Signs of Aspiration yes  -AW yes  -AW       Anticipated Discharge Disposition (SLP) unknown;anticipate therapy at next level of care  -AW home with assist  -AW          (STG) Patient will tolerate trials of    Consistencies Trialed (Tolerate trials) soft to chew (chopped) textures;nectar/ mildly thick liquids  -AW --       Desired Outcome (Tolerate trials) without signs/symptoms of aspiration  -AW --       [place] (Tolerate trials) independently (over 90% accuracy)  -AW --       Time Frame (Tolerate trials) by discharge  -AW --          (STG) Lingual Strengthening Goal 1 (SLP)    Activity (Lingual Strengthening Goal 1, SLP) increase tongue back strength  -AW --       Increase Tongue Back Strength lingual movement exercises;lingual resistance exercises  -AW --       [place]/Accuracy (Lingual Strengthening Goal 1, SLP) with minimal cues (75-90% accuracy)  -AW --       Time Frame (Lingual Strengthening Goal 1, SLP) by discharge  -AW --          (STG) Pharyngeal Strengthening Exercise Goal 1 (SLP)    Activity (Pharyngeal Strengthening Goal 1, SLP) increase anterior movement of the hyolaryngeal complex  -AW --       Increase Anterior Movement of the Hyolaryngeal Complex falsetto;hard effortful swallow;olga  -AW --       [place]/Accuracy (Pharyngeal Strengthening Goal 1, SLP) with minimal cues (75-90% accuracy)  -AW --       Time Frame (Pharyngeal Strengthening Goal 1, SLP) by discharge  -AW --             User Key  (r) = Recorded By, (t) = Taken By, (c) = Cosigned By    Initials Name Effective Dates    Glenys Marcial MS [facility]-SLP 06/16/21 -                 EDUCATION  The patient has been educated in the following areas:   Dysphagia (Swallowing Impairment) Oral Care/Hydration Modified Diet Instruction.        SLP GOALS     Row Name 02/01/23 1500             (STG) Patient will tolerate trials of    Consistencies Trialed (Tolerate trials) soft to chew (chopped) textures;nectar/ mildly thick liquids  -AW       Desired Outcome (Tolerate trials) without signs/symptoms of aspiration  -AW      [place] (Tolerate trials) independently (over 90% accuracy)  -AW      Time Frame (Tolerate trials) by discharge  -AW         (STG) Lingual Strengthening Goal 1 (SLP)    Activity (Lingual Strengthening Goal 1, SLP) increase tongue back strength  -AW      Increase Tongue Back Strength lingual movement exercises;lingual resistance exercises  -AW      [place]/Accuracy (Lingual Strengthening Goal 1, SLP) with minimal cues (75-90% accuracy)  -AW      Time Frame (Lingual Strengthening Goal 1, SLP) by discharge  -AW         (STG) Pharyngeal Strengthening Exercise Goal 1 (SLP)    Activity (Pharyngeal Strengthening Goal 1, SLP) increase anterior movement of the hyolaryngeal complex  -AW      Increase Anterior Movement of the Hyolaryngeal Complex falsetto;hard effortful swallow;olga  -AW      [place]/Accuracy (Pharyngeal Strengthening Goal 1, SLP) with minimal cues (75-90% accuracy)  -AW      Time Frame (Pharyngeal Strengthening Goal 1, SLP) by discharge  -AW            User Key  (r) = Recorded By, (t) = Taken By, (c) = Cosigned By    Initials Name Provider Type    Glenys Marcial MS CCC-SLP Speech and Language Pathologist                   Time Calculation:    Time Calculation- SLP     Row Name 02/01/23 1538             Time Calculation- SLP    SLP Start Time 1230  -AW      SLP Received On 02/01/23  -AW            User Key  (r) = Recorded By, (t) = Taken By, (c) = Cosigned By    Initials Name Provider Type    Glenys Marcial MS CCC-SLP Speech and Language Pathologist                Therapy Charges for Today     Code Description Service Date Service Provider Modifiers Qty    73255731926 HC ST EVAL ORAL PHARYNG SWALLOW 3 1/31/2023 Glenys Turner MS CCC-SLP GN 1    [id number] HC ST MOTION FLUORO EVAL SWALLOW 5 2/1/2023 Glenys Turner MS CCC-SLP GN 1               Glenys Turner MS CCC-JUANI  2/1/2023

## 2023-02-01 NOTE — PROGRESS NOTES
"[facility]    Patient Name: LAURA Sequeira  :1935  87 y.o.  LOS: 2  Encounter Provider: Grant Ravi Jr, MD      Patient Care Team:  Celestine English DO as PCP - General (Internal Medicine)  Audra Vazquez RPH as Pharmacist  Vasquez Niño, PharmD as Pharmacist (Pharmacy)  Tatiana Tinoco MD as Consulting Physician (Gastroenterology)    Chief Complaint: Follow-up right lower lobe pneumonia, NSTEMI type II, acute on chronic kidney disease, valvular heart disease status post mechanical AVR with higher target INR, hypertension, persistent atrial fibrillation    Interval History: No acute issues overnight.  Pulmonary status much improved.       Objective   Vital Signs  Temp:  [97.8 °F (36.6 °C)-98.3 °F (36.8 °C)] 97.8 °F (36.6 °C)  Heart Rate:  [86-90] 89  Resp:  [18-22] 22  BP: (105-144)/(66-78) 105/77    Intake/Output Summary (Last 24 hours) at 2023 1331  Last data filed at 2023 0930  Gross per 24 hour   Intake 420 ml   Output 1160 ml   Net -740 ml     Flowsheet Rows    Flowsheet Row First Filed Value   Admission Height 182.9 cm (72\") Documented at 2023 1700   Admission Weight 66.1 kg (145 lb 12.8 oz) Documented at 2023 1700            Physical Exam  Vitals reviewed.   Constitutional:       Appearance: Not in distress. Frail. Chronically ill-appearing.   Neck:      Vascular: No JVR. JVD normal.   Pulmonary:      Effort: Pulmonary effort is normal.      Breath sounds: No wheezing. No rhonchi. Bibasilar Rales present.   Chest:      Chest wall: Not tender to palpatation.   Cardiovascular:      PMI at left midclavicular line. Normal rate. Irregularly irregular rhythm. Normal S1. Normal S2.      Murmurs: There is no murmur.      No gallop. No click. No rub.   Pulses:     Intact distal pulses.   Edema:     Peripheral edema absent.   Abdominal:      General: Bowel sounds are normal.      Palpations: Abdomen is soft.      Tenderness: There is no abdominal tenderness. "   Musculoskeletal: Normal range of motion.         General: No tenderness. Skin:     General: Skin is warm and dry.   Neurological:      General: No focal deficit present.      Mental Status: Alert and oriented to person, place and time.     Physical exam was reviewed, updated and amended when necessary.    Pertinent Test Results:  Results from last 7 days   Lab Units 02/01/23  0918 01/31/23  0357 01/30/23  0955   SODIUM mmol/L 141 139 138   POTASSIUM mmol/L 4.5 4.5 4.8   CHLORIDE mmol/L 107 107 104   CO2 mmol/L 25.0 21.8* 21.6*   BUN mg/dL 29* 32* 34*   CREATININE mg/dL 2.01* 1.63* 1.99*   GLUCOSE mg/dL 178* 131* 231*   CALCIUM mg/dL 8.7 8.8 9.0   AST (SGOT) U/L  --   --  23   ALT (SGPT) U/L  --   --  19     Results from last 7 days   Lab Units 01/31/23  0357 01/30/23  0955   TROPONIN T ng/mL 0.024 0.041*     Results from last 7 days   Lab Units 02/01/23  0918 01/31/23  0357 01/30/23  0955   WBC 10*3/mm3 7.51 7.16 10.96*   HEMOGLOBIN g/dL 9.9* 10.1* 10.7*   HEMATOCRIT % 32.3* 34.2* 35.3*   PLATELETS 10*3/mm3 156 154 140     Results from last 7 days   Lab Units 02/01/23  0403 01/31/23  0357 01/30/23  0955 01/27/23  0000   INR  4.99* 4.34* 3.48* 3.40   APTT seconds  --   --  69.5*  --      Results from last 7 days   Lab Units 01/31/23  0357   MAGNESIUM mg/dL 2.6*           Invalid input(s): LDLCALC  Results from last 7 days   Lab Units 01/30/23  0955   PROBNP pg/mL 2,084.0*               Medication Review:   atorvastatin, 40 mg, Oral, Daily  cefTRIAXone, 1 g, Intravenous, Q24H  digoxin, 62.5 mcg, Oral, Daily  famotidine, 20 mg, Oral, Daily  ferrous sulfate, 325 mg, Oral, Every Other Day  guaiFENesin, 1,200 mg, Oral, Q12H  magnesium oxide, 400 mg, Oral, BID  metoprolol succinate XL, 25 mg, Oral, Daily  mupirocin, 1 application, Topical, Q12H  sodium chloride, 10 mL, Intravenous, Q12H         Pharmacy to dose warfarin,         Assessment & Plan     Active Hospital Problems    Diagnosis  POA   • **Pneumonia of right  lower lobe due to infectious organism [J18.9]  Yes   • Supratherapeutic INR [R79.1]  Unknown   • History of CVA (cerebrovascular accident) [Z86.73]  Not Applicable   • Anemia [D64.9]  Yes   • History of Clostridium difficile infection [Z86.19]  Yes   • Chronic anticoagulation [Z79.01]  Not Applicable   • BYRON (acute kidney injury) (HCC) [N17.9]  Yes   • Stage 3b chronic kidney disease (HCC) [N18.32]  Yes   • History of aortic valve replacement with metallic valve [Z95.4]  Not Applicable   • Type 2 diabetes mellitus (HCC) [E11.9]  Yes   • Hypertension [I10]  Yes   • Atrial fibrillation (HCC) [I48.91]  Yes      Resolved Hospital Problems   No resolved problems to display.        1. Pneumonia -he looks much better today.  He is euvolemic on exam and there is been a small bump in creatinine after Lasix dosing yesterday.  I will hold diuretics for now.  Continue antibiotics per internal medicine.  2. Valvular heart disease -status post mechanical AVR.  Pharmacy managing Coumadin dosing and INR.  Echo shows normal prosthetic valve gradients with low normal EF.  Tricuspid regurgitation and pulmonary hypertension previously known.   He looks euvolemic today.  3. NSTEMI type II -in the setting of pneumonia, CKD and atrial fibrillation with RVR.  No angina.  No ischemic changes on EKG.  4. CKD -creatinine is back down to what appears to be potentially new baseline?  Consider nephrology evaluation given the fact the patient had normal creatinine in May 2022.  5. Persistent atrial fibrillation -on warfarin with pharmacy dosing.  Heart rate is better controlled on low-dose Toprol.   6. Hypertension  7. Diabetes  8. History of stroke  9. History of GI bleed secondary to AVM    Cardiovascular issues seem stable.  We will follow while he is in the hospital.  Okay for discharge from cardiovascular standpoint.  Mild bump in creatinine but it is within the range he has been since last August.  May consider nephrology evaluation  inpatient versus outpatient.    Grant Ravi Jr, MD  [facility]  02/01/23  13:31 EST        Result Review :

## 2023-02-01 NOTE — PLAN OF CARE
Goal Outcome Evaluation:  Plan of Care Reviewed With: patient           Outcome Evaluation: VFSS completed. Pt had silent penetration/aspiration of thin. Pt had an anterior buldging just below the cricopharyngeus (CP) at C5-7, question osteophyte versus soft tissue change. 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 This resulted in residue in the upper esophagus with no backflow noted through the CP, however, increases risk. Recommend soft, chopped w/ NTL, no mixed; meds w/ pureed (whole/crushed); upright for meals and 30 min after; slow rate; small bites/sips; double swallow. ST to follow. Recommend swallow therapy at next level of care.

## 2023-02-01 NOTE — PLAN OF CARE
Goal Outcome Evaluation:   Vital signs stable.  On room air throughout shift, will get short of air with exertion.  Patient up with 2 strong assist to transfer 3 steps. Echo taken today. Chest xray taken today.  Patient using home glucometer and home insulin subcutaneous pump.  Nursing staff filling out log sheet at bedside.  Nursing staff able to use home glucometer by holding it next to sensor on R arm.   Using urinal at bedside.  Sometimes incontinent d/t urgency.

## 2023-02-01 NOTE — CASE MANAGEMENT/SOCIAL WORK
Discharge Planning Assessment  [facility]     Patient Name: LAURA Sequeira  MRN: [id number]  Today's Date: 2/1/2023    Admit Date: 1/30/2023    Plan: Plans home with family assistance and [facility].   Discharge Needs Assessment     Row Name 02/01/23 1130       Living Environment    People in Home spouse    Name(s) of People in Home spouse Gladys Sequeira [phone]    Current Living Arrangements home    Primary Care Provided by self;spouse/significant other    Provides Primary Care For no one, unable/limited ability to care for self    Family Caregiver if Needed spouse    Family Caregiver Names spouse Gladys Sequeira [phone]    Quality of Family Relationships helpful;involved;supportive    Able to Return to Prior Arrangements yes       Resource/Environmental Concerns    Resource/Environmental Concerns none    Transportation Concerns no car       Transition Planning    Patient/Family Anticipates Transition to home with family    Patient/Family Anticipated Services at Transition home health care    Transportation Anticipated family or friend will provide       Discharge Needs Assessment    Equipment Currently Used at Home shower chair;wheelchair;walker, rolling    Concerns to be Addressed discharge planning    Anticipated Changes Related to Illness inability to care for self    Equipment Needed After Discharge none    Discharge Facility/Level of Care Needs home with home health    Current Discharge Risk physical impairment               Discharge Plan     Row Name 02/01/23 1128       Plan    Plan Plans home with family assistance and [facility].    Patient/Family in Agreement with Plan yes    Provided Post Acute Provider List? N/A    N/A Provider List Comment Agreeable to a referral to [facility]    Provided Post Acute Provider Quality & Resource List? N/A    N/A Quality & Resource List Comment Agreeable to a referral to [facility]    Plan Comments Met with the patient and his spouse Gladys Hua [phone]; explained  role of CCP, verified facesheet, checked IMM and discussed discharge planning needs. The patient plans to return home upon d/c with assistance from his spouse.  The patient has a walker, wheelchair, showerchair and no steps to enter their single story home.  The patient’s PCP is Celestine English, pharmacy is [facility] on [address] and Kirk Loja and his wife assists him with taking his medication therefore he has no trouble remembering to take his medication and has no trouble affording his medication. [facility] and would be agreeable to using them again upon d/c per P.T. recommendations for HH- referral made to [facility].  The patient has been to [facility] acute rehab previously as well.  The patient’s spouse states that she has already provided 3 copies of the patient’s living will to [facility] and she was encouraged to bring them again. The patient’s spouse transports him to his appointments and will transport him upon d/c. The patient has an INR machine at home.  CCP will follow to see if [facility] can accept the patient and if he will need any IV antibiotics upon d/c- currently on IV Rocephin. T.D, CSW              Continued Care and Services - Admitted Since 1/30/2023     Home Medical Care     Service Provider Request Status Selected Services Address Phone Fax Patient Preferred     [facility] Pending - Request Sent N/A [id number] [address] [phone] --              Expected Discharge Date and Time     Expected Discharge Date Expected Discharge Time    Feb 3, 2023          Demographic Summary     Row Name 02/01/23 1130       General Information    Admission Type inpatient    Arrived From home    Referral Source admission list    Reason for Consult discharge planning    Preferred Language English       Contact Information    Permission Granted to Share Info With family/designee               Functional Status     Row Name 02/01/23 1136       Functional  Status    Usual Activity Tolerance moderate    Current Activity Tolerance fair       Functional Status, IADL    Medications assistive equipment    Meal Preparation assistive equipment    Housekeeping assistive equipment    Laundry assistive equipment    Shopping assistive equipment       Mental Status    General Appearance WDL WDL       Mental Status Summary    Recent Changes in Mental Status/Cognitive Functioning no changes               Psychosocial    No documentation.                Abuse/Neglect    No documentation.                Legal    No documentation.                Substance Abuse    No documentation.                Patient Forms    No documentation.                   JASON Wilson

## 2023-02-01 NOTE — THERAPY TREATMENT NOTE
Patient Name: LAURA Sequeira  : 1935    MRN: [id number]                              Today's Date: 2023       Admit Date: 2023    Visit Dx:     ICD-10-CM ICD-9-CM   1. Pneumonia of right lower lobe due to infectious organism  J18.9 486   2. Sepsis, due to unspecified organism, unspecified whether acute organ dysfunction present (HCC)  A41.9 038.9     995.91   3. Acute renal failure superimposed on chronic kidney disease, unspecified CKD stage, unspecified acute renal failure type (HCC)  N17.9 584.9    N18.9 585.9   4. Hyperglycemia due to diabetes mellitus (HCC)  E11.65 250.02   5. 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)   • Iron deficiency anemia   • Chronic anticoagulation   • Hemiparesis of left nondominant side as late effect of cerebral infarction (HCC)   • Rectus sheath hematoma   • Hospital discharge follow-up   • Sepsis (HCC)   • Calculus of gallbladder with acute cholecystitis without obstruction   • History of Clostridium difficile infection   • Aspiration pneumonitis (HCC)   • Hematoma of iliopsoas muscle, left, initial encounter   • History of CVA (cerebrovascular accident)   • S/P laparoscopic cholecystectomy   • Anemia   • Hematoma of left iliopsoas muscle   • Pneumonia of right lower lobe due to infectious organism   • Supratherapeutic INR     Past Medical History:   Diagnosis Date   • Allergic rhinitis    • Aortic valve insufficiency    • Ascending aortic aneurysm    • Aspiration pneumonia (HCC)    • Atrial  fibrillation (HCC)    • Bacteremia    • Calcific aortic stenosis of bicuspid valve    • Cardiac arrest (HCC)    • Cataracts, bilateral    • CKD (chronic kidney disease) stage 3, GFR 30-59 ml/min (HCC)    • Contact dermatitis due to poison ivy    • Elbow fracture    • GERD (gastroesophageal reflux disease)    • GI bleed     2021; s/p Colonoscopy and EGD   • H/O mechanical aortic valve replacement    • Head injury    • History of transfusion    • Hyperlipidemia    • Hypertension    • Kidney carcinoma (HCC)    • 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: follows with [facility] for management   • Nonischemic cardiomyopathy (HCC)    • Prostate cancer (HCC)    • Renal oncocytoma    • Stroke (cerebrum) (HCC)    • Type 2 diabetes mellitus (HCC)    • Visual field defect      Past Surgical History:   Procedure Laterality Date   • AORTIC VALVE REPAIR/REPLACEMENT     • ASCENDING AORTIC ANEURYSM REPAIR W/ MECHANICAL AORTIC VALVE REPLACEMENT     • CHOLECYSTECTOMY WITH INTRAOPERATIVE CHOLANGIOGRAM N/A 03/29/2022    Procedure: Laparoscopic cholecystectomy with cholangiogram, possible open;  Surgeon: Lisa Guidry MD;  Location: [facility] MAIN OR;  Service: General;  Laterality: N/A;   • COLONOSCOPY N/A 10/28/2021    Procedure: COLONOSCOPY to cecum:  cold snare polyps,;  Surgeon: Dinesh Meza MD;  Location: [facility] ENDOSCOPY;  Service: Gastroenterology;  Laterality: N/A;  pre:  Iron deficiency anemia  post:  polyps, diverticulosis,    • COLONOSCOPY N/A 11/09/2021    Procedure: COLONOSCOPY to cecum with APC cautery of AVM and clip placement x1;  Surgeon: Pavan Rodriguez MD;  Location: [facility] ENDOSCOPY;  Service: Gastroenterology;  Laterality: N/A;  PRE - gi bleed, anemia  POST - diverticulosis, poor prep, AVM right colon   • ENDOSCOPY N/A 10/28/2021    Procedure: ESOPHAGOGASTRODUODENOSCOPY with biopsies;  Surgeon: Dinesh Meza MD;  Location: [facility] ENDOSCOPY;  Service:  Gastroenterology;  Laterality: N/A;  pre:  Iron deficiency anemia  post:  duodenitis and gastritis   • EYE SURGERY  12/09/2020    cataract surgery    • NEPHRECTOMY     • OTHER SURGICAL HISTORY      elbow surgery   • OTHER SURGICAL HISTORY      right arm surgery   • PROSTATE SURGERY      prostate removal   • THORACENTESIS Left     diagnostic      General Information     [facility] Name 02/01/23 1310          Physical Therapy Time and Intention    Document Type therapy note (daily note)  -     Mode of Treatment individual therapy;physical therapy  -[facility] Name 02/01/23 1310          General Information    Patient Profile Reviewed yes  -     Existing Precautions/Restrictions fall  L knee brace for ambulation  -[facility] Name 02/01/23 1310          Cognition    Orientation Status (Cognition) oriented x 3  -[facility] Name 02/01/23 1310          Safety Issues, Functional Mobility    Impairments Affecting Function (Mobility) balance;endurance/activity tolerance;strength;range of motion (ROM)  -           User Key  (r) = Recorded By, (t) = Taken By, (c) = Cosigned By    Initials Name Provider Type     Yvonne Navarro PT Physical Therapist               Mobility     [facility] Name 02/01/23 1313          Bed Mobility    Bed Mobility supine-sit  -     Supine-Sit [place] (Bed Mobility) minimum assist (75% patient effort);1 person assist;verbal cues  -     Assistive Device (Bed Mobility) bed rails;head of bed elevated  -[facility] Name 02/01/23 1313          Sit-Stand Transfer    Sit-Stand [place] (Transfers) verbal cues;minimum assist (75% patient effort);1 person assist  -     Assistive Device (Sit-Stand Transfers) walker, front-wheeled  -[facility] Name 02/01/23 1313          Gait/Stairs (Locomotion)    [place] Level (Gait) verbal cues;minimum assist (75% patient effort);1 person assist  -     Assistive Device (Gait) walker, front-wheeled  -     Distance in Feet (Gait) 10ft + 15ft  -      Deviations/Abnormal Patterns (Gait) antalgic;raul decreased;base of support, narrow;gait speed decreased;festinating/shuffling;stride length decreased  -     Bilateral Gait Deviations forward flexed posture  -     Left Sided Gait Deviations weight shift ability decreased  -     Comment, (Gait/Stairs) Wife assisted with donning L knee brace - has to fully extend knee once standing to lock brace and prevent buckling  -           User Key  (r) = Recorded By, (t) = Taken By, (c) = Cosigned By    Initials Name Provider Type     Yvonne Navarro, PT Physical Therapist               Obj/Interventions    No documentation.                Goals/Plan    No documentation.                Clinical Impression     Row Name 02/01/23 1315          Pain    Pretreatment Pain Rating 0/10 - no pain  -     Posttreatment Pain Rating 0/10 - no pain  -     Row Name 02/01/23 1318          Plan of Care Review    Plan of Care Reviewed With patient;spouse  -     Progress improving  -     Outcome Evaluation Pt seen for PT this AM and tolerated mobility well. Pt's INR remains elevated, but pt safe with mobility. Pt transferred to EOB with min A x1 and wife assisted with donning L knee brace. Pt stood with min A x1 and ambulated 10ft to bathroom. Required increased assist slowly lowering to commode and min A x1 to stand again. Pt ambulated 15ft to chair with min A x1 - cues for upright posture and rwx navigation. Assisted with repositioning in chair and left with needs met, wife present. Pt encouraged to call out for assist back to bed or up to bathroom. Pt's wife reports she assists the pt with everything at home - educated pt and wife about need for staff assistance for safety as pt has not been getting up as much as he normally would at home. PT will continue to follow to progress mobility as tolerated. Anticipate DC home with HHPT.  -     Row Name 02/01/23 1316          Vital Signs    Pre SpO2 (%) 99  -     O2 Delivery  Pre Treatment supplemental O2  -     Intra SpO2 (%) 98  -     O2 Delivery Intra Treatment room air  -     Post SpO2 (%) 96  -     O2 Delivery Post Treatment room air  -     Row Name 02/01/23 1315          Positioning and Restraints    Pre-Treatment Position in bed  -     Post Treatment Position chair  -     In Chair sitting;call light within reach;encouraged to call for assist;with family/caregiver  -           User Key  (r) = Recorded By, (t) = Taken By, (c) = Cosigned By    Initials Name Provider Type     Yvonne Navarro, PT Physical Therapist               Outcome Measures     Row Name 02/01/23 1325 02/01/23 0843       How much help from another person do you currently need...    Turning from your back to your side while in flat bed without using bedrails? 3  - 4  -AZ    Moving from lying on back to sitting on the side of a flat bed without bedrails? 3  - 3  -AZ    Moving to and from a bed to a chair (including a wheelchair)? 3  - 2  -AZ    Standing up from a chair using your arms (e.g., wheelchair, bedside chair)? 3  - 2  -AZ    Climbing 3-5 steps with a railing? 2  - 2  -AZ    To walk in hospital room? 3  - 2  -AZ    AM-PAC 6 Clicks Score (PT) 17  - 15  -AZ    Highest level of mobility 5 --> Static standing  - 4 --> Transferred to chair/commode  -AZ    Row Name 02/01/23 1325          Functional Assessment    Outcome Measure Options AM-PAC 6 Clicks Basic Mobility (PT)  -           User Key  (r) = Recorded By, (t) = Taken By, (c) = Cosigned By    Initials Name Provider Type     Yvonne Navarro, PT Physical Therapist    Manuel Davila, RN Registered Nurse                             Physical Therapy Education     Title: PT OT SLP Therapies (In Progress)     Topic: Physical Therapy (In Progress)     Point: Mobility training (Done)     Learning Progress Summary           Patient Acceptance, E,TB,D, VU,NR by  at 2/1/2023 1326    Acceptance, E,TB,D, VU,NR by  at 1/31/2023  1000                   Point: Home exercise program (Not Started)     Learner Progress:  Not documented in this visit.          Point: Body mechanics (Done)     Learning Progress Summary           Patient Acceptance, E,TB,D, VU,NR by  at 2/1/2023 1326    Acceptance, E,TB,D, VU,NR by  at 1/31/2023 1000                   Point: Precautions (Done)     Learning Progress Summary           Patient Acceptance, E,TB,D, VU,NR by  at 2/1/2023 1326    Acceptance, E,TB,D, VU,NR by  at 1/31/2023 1000                               User Key     Initials Effective Dates Name Provider Type Discipline     04/08/22 -  Yvonne Navarro, PT Physical Therapist PT              PT Recommendation and Plan  Planned Therapy Interventions (PT): balance training, bed mobility training, gait training, home exercise program, patient/family education, strengthening, ROM (range of motion), transfer training  Plan of Care Reviewed With: patient, spouse  Progress: improving  Outcome Evaluation: Pt seen for PT this AM and tolerated mobility well. Pt's INR remains elevated, but pt safe with mobility. Pt transferred to EOB with min A x1 and wife assisted with donning L knee brace. Pt stood with min A x1 and ambulated 10ft to bathroom. Required increased assist slowly lowering to commode and min A x1 to stand again. Pt ambulated 15ft to chair with min A x1 - cues for upright posture and rwx navigation. Assisted with repositioning in chair and left with needs met, wife present. Pt encouraged to call out for assist back to bed or up to bathroom. Pt's wife reports she assists the pt with everything at home - educated pt and wife about need for staff assistance for safety as pt has not been getting up as much as he normally would at home. PT will continue to follow to progress mobility as tolerated. Anticipate DC home with HHPT.     Time Calculation:    PT Charges     Row Name 02/01/23 1326             Time Calculation    Start Time 0942  -       Stop Time 1013  -      Time Calculation (min) 31 min  -      PT Received On 02/01/23  -      PT - Next Appointment 02/02/23  -         Time Calculation- PT    Total Timed Code Minutes- PT 31 minute(s)  -         Timed Charges    19569 - Gait Training Minutes  13  -BH      59408 - PT Therapeutic Activity Minutes 18  -         Total Minutes    Timed Charges Total Minutes 31  -       Total Minutes 31  -BH            User Key  (r) = Recorded By, (t) = Taken By, (c) = Cosigned By    Initials Name Provider Type     Yvonne Navarro, PT Physical Therapist              Therapy Charges for Today     Code Description Service Date Service Provider Modifiers Qty    84769036894 HC PT THERAPEUTIC ACT EA 15 MIN 1/31/2023 Yvonne Navarro, PT GP 1    32927620694 HC PT EVAL LOW COMPLEXITY 3 1/31/2023 Yvonne Navarro, PT GP 1    58146286363 HC GAIT TRAINING EA 15 MIN 2/1/2023 Yvonne Navarro, PT GP 1    51546466062 HC PT THERAPEUTIC ACT EA 15 MIN 2/1/2023 Yvonne Navarro, PT GP 1          PT G-Codes  Outcome Measure Options: AM-PAC 6 Clicks Basic Mobility (PT)  AM-PAC 6 Clicks Score (PT): 17  PT Discharge Summary  Anticipated Discharge Disposition (PT): home with assist, home with home health    Yvonne Navarro, PT  2/1/2023

## 2023-02-02 ENCOUNTER — HOME HEALTH ADMISSION (OUTPATIENT)
Dept: HOME HEALTH SERVICES | Facility: HOME HEALTHCARE | Age: 88
End: 2023-02-02
Payer: COMMERCIAL

## 2023-02-02 ENCOUNTER — READMISSION MANAGEMENT (OUTPATIENT)
Dept: CALL CENTER | Facility: HOSPITAL | Age: 88
End: 2023-02-02
Payer: MEDICARE

## 2023-02-02 VITALS
BODY MASS INDEX: 19.14 KG/M2 | OXYGEN SATURATION: 96 % | TEMPERATURE: 97.3 F | HEIGHT: 72 IN | HEART RATE: 88 BPM | DIASTOLIC BLOOD PRESSURE: 75 MMHG | WEIGHT: 141.31 LBS | RESPIRATION RATE: 18 BRPM | SYSTOLIC BLOOD PRESSURE: 135 MMHG

## 2023-02-02 PROBLEM — N17.9 AKI (ACUTE KIDNEY INJURY) (HCC): Status: RESOLVED | Noted: 2020-01-14 | Resolved: 2023-02-02

## 2023-02-02 LAB
ALBUMIN SERPL-MCNC: 2.8 G/DL (ref 3.5–5.2)
ANION GAP SERPL CALCULATED.3IONS-SCNC: 11.1 MMOL/L (ref 5–15)
BUN SERPL-MCNC: 25 MG/DL (ref 8–23)
BUN/CREAT SERPL: 16.8 (ref 7–25)
CALCIUM SPEC-SCNC: 8.5 MG/DL (ref 8.6–10.5)
CHLORIDE SERPL-SCNC: 106 MMOL/L (ref 98–107)
CO2 SERPL-SCNC: 22.9 MMOL/L (ref 22–29)
CREAT SERPL-MCNC: 1.49 MG/DL (ref 0.76–1.27)
DEPRECATED RDW RBC AUTO: 44.3 FL (ref 37–54)
EGFRCR SERPLBLD CKD-EPI 2021: 45.7 ML/MIN/1.73
ERYTHROCYTE [DISTWIDTH] IN BLOOD BY AUTOMATED COUNT: 15.8 % (ref 12.3–15.4)
GLUCOSE BLDC GLUCOMTR-MCNC: 100 MG/DL (ref 70–130)
GLUCOSE BLDC GLUCOMTR-MCNC: 43 MG/DL (ref 70–130)
GLUCOSE SERPL-MCNC: 77 MG/DL (ref 65–99)
HCT VFR BLD AUTO: 35.1 % (ref 37.5–51)
HGB BLD-MCNC: 10.4 G/DL (ref 13–17.7)
INR PPP: 4.9 (ref 0.9–1.1)
MCH RBC QN AUTO: 23.3 PG (ref 26.6–33)
MCHC RBC AUTO-ENTMCNC: 29.6 G/DL (ref 31.5–35.7)
MCV RBC AUTO: 78.5 FL (ref 79–97)
PHOSPHATE SERPL-MCNC: 3.6 MG/DL (ref 2.5–4.5)
PLATELET # BLD AUTO: 181 10*3/MM3 (ref 140–450)
PMV BLD AUTO: 11.6 FL (ref 6–12)
POTASSIUM SERPL-SCNC: 4 MMOL/L (ref 3.5–5.2)
PROTHROMBIN TIME: 46.5 SECONDS (ref 11.7–14.2)
RBC # BLD AUTO: 4.47 10*6/MM3 (ref 4.14–5.8)
SODIUM SERPL-SCNC: 140 MMOL/L (ref 136–145)
WBC NRBC COR # BLD: 6.45 10*3/MM3 (ref 3.4–10.8)

## 2023-02-02 PROCEDURE — 80069 RENAL FUNCTION PANEL: CPT | Performed by: INTERNAL MEDICINE

## 2023-02-02 PROCEDURE — 97530 THERAPEUTIC ACTIVITIES: CPT

## 2023-02-02 PROCEDURE — 85027 COMPLETE CBC AUTOMATED: CPT | Performed by: NURSE PRACTITIONER

## 2023-02-02 PROCEDURE — 36415 COLL VENOUS BLD VENIPUNCTURE: CPT | Performed by: EMERGENCY MEDICINE

## 2023-02-02 PROCEDURE — 85610 PROTHROMBIN TIME: CPT | Performed by: EMERGENCY MEDICINE

## 2023-02-02 PROCEDURE — 82962 GLUCOSE BLOOD TEST: CPT

## 2023-02-02 PROCEDURE — 25010000002 CEFTRIAXONE PER 250 MG: Performed by: NURSE PRACTITIONER

## 2023-02-02 RX ORDER — GUAIFENESIN 600 MG/1
1200 TABLET, EXTENDED RELEASE ORAL EVERY 12 HOURS SCHEDULED
Qty: 40 TABLET | Refills: 0 | Status: SHIPPED | OUTPATIENT
Start: 2023-02-02 | End: 2023-02-12

## 2023-02-02 RX ORDER — CEFDINIR 300 MG/1
300 CAPSULE ORAL 2 TIMES DAILY
Qty: 6 CAPSULE | Refills: 0 | Status: SHIPPED | OUTPATIENT
Start: 2023-02-03 | End: 2023-02-06

## 2023-02-02 RX ORDER — BENZONATATE 200 MG/1
200 CAPSULE ORAL EVERY 8 HOURS PRN
Qty: 30 CAPSULE | Refills: 0 | Status: SHIPPED | OUTPATIENT
Start: 2023-02-02

## 2023-02-02 RX ADMIN — ATORVASTATIN CALCIUM 40 MG: 20 TABLET, FILM COATED ORAL at 08:40

## 2023-02-02 RX ADMIN — MUPIROCIN CALCIUM 1 APPLICATION: 20 CREAM TOPICAL at 08:45

## 2023-02-02 RX ADMIN — GUAIFENESIN 1200 MG: 600 TABLET, EXTENDED RELEASE ORAL at 08:40

## 2023-02-02 RX ADMIN — CEFTRIAXONE SODIUM 1 G: 1 INJECTION, POWDER, FOR SOLUTION INTRAMUSCULAR; INTRAVENOUS at 10:56

## 2023-02-02 RX ADMIN — FAMOTIDINE 20 MG: 20 TABLET, FILM COATED ORAL at 08:40

## 2023-02-02 RX ADMIN — METOPROLOL SUCCINATE 25 MG: 25 TABLET, EXTENDED RELEASE ORAL at 08:40

## 2023-02-02 RX ADMIN — DIGOXIN 62.5 MCG: 125 TABLET ORAL at 12:29

## 2023-02-02 RX ADMIN — Medication 10 ML: at 08:46

## 2023-02-02 RX ADMIN — FERROUS SULFATE TAB 325 MG (65 MG ELEMENTAL FE) 325 MG: 325 (65 FE) TAB at 08:40

## 2023-02-02 RX ADMIN — MAGNESIUM OXIDE 400 MG (241.3 MG MAGNESIUM) TABLET 400 MG: TABLET at 08:40

## 2023-02-02 NOTE — THERAPY TREATMENT NOTE
Patient Name: LAURA Sequeira  : 1935    MRN: [id number]                              Today's Date: 2023       Admit Date: 2023    Visit Dx:     ICD-10-CM ICD-9-CM   1. Pneumonia of right lower lobe due to infectious organism  J18.9 486   2. Sepsis, due to unspecified organism, unspecified whether acute organ dysfunction present (HCC)  A41.9 038.9     995.91   3. Acute renal failure superimposed on chronic kidney disease, unspecified CKD stage, unspecified acute renal failure type (HCC)  N17.9 584.9    N18.9 585.9   4. Hyperglycemia due to diabetes mellitus (HCC)  E11.65 250.02   5. 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)   • Iron deficiency anemia   • Chronic anticoagulation   • Hemiparesis of left nondominant side as late effect of cerebral infarction (HCC)   • Rectus sheath hematoma   • Hospital discharge follow-up   • Sepsis (HCC)   • Calculus of gallbladder with acute cholecystitis without obstruction   • History of Clostridium difficile infection   • Aspiration pneumonitis (HCC)   • Hematoma of iliopsoas muscle, left, initial encounter   • History of CVA (cerebrovascular accident)   • S/P laparoscopic cholecystectomy   • Anemia   • Hematoma of left iliopsoas muscle   • Pneumonia of right lower lobe due to infectious organism   • Supratherapeutic INR     Past Medical History:   Diagnosis Date   • Allergic rhinitis    • Aortic valve insufficiency    • Ascending aortic aneurysm    • Aspiration pneumonia (HCC)    • Atrial  fibrillation (HCC)    • Bacteremia    • Calcific aortic stenosis of bicuspid valve    • Cardiac arrest (HCC)    • Cataracts, bilateral    • CKD (chronic kidney disease) stage 3, GFR 30-59 ml/min (HCC)    • Contact dermatitis due to poison ivy    • Elbow fracture    • GERD (gastroesophageal reflux disease)    • GI bleed     2021; s/p Colonoscopy and EGD   • H/O mechanical aortic valve replacement    • Head injury    • History of transfusion    • Hyperlipidemia    • Hypertension    • Kidney carcinoma (HCC)    • 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: follows with [facility] for management   • Nonischemic cardiomyopathy (HCC)    • Prostate cancer (HCC)    • Renal oncocytoma    • Stroke (cerebrum) (HCC)    • Type 2 diabetes mellitus (HCC)    • Visual field defect      Past Surgical History:   Procedure Laterality Date   • AORTIC VALVE REPAIR/REPLACEMENT     • ASCENDING AORTIC ANEURYSM REPAIR W/ MECHANICAL AORTIC VALVE REPLACEMENT     • CHOLECYSTECTOMY WITH INTRAOPERATIVE CHOLANGIOGRAM N/A 03/29/2022    Procedure: Laparoscopic cholecystectomy with cholangiogram, possible open;  Surgeon: Lisa Guidry MD;  Location: [facility] MAIN OR;  Service: General;  Laterality: N/A;   • COLONOSCOPY N/A 10/28/2021    Procedure: COLONOSCOPY to cecum:  cold snare polyps,;  Surgeon: Dinesh Meza MD;  Location: [facility] ENDOSCOPY;  Service: Gastroenterology;  Laterality: N/A;  pre:  Iron deficiency anemia  post:  polyps, diverticulosis,    • COLONOSCOPY N/A 11/09/2021    Procedure: COLONOSCOPY to cecum with APC cautery of AVM and clip placement x1;  Surgeon: Pavan Rodriguez MD;  Location: [facility] ENDOSCOPY;  Service: Gastroenterology;  Laterality: N/A;  PRE - gi bleed, anemia  POST - diverticulosis, poor prep, AVM right colon   • ENDOSCOPY N/A 10/28/2021    Procedure: ESOPHAGOGASTRODUODENOSCOPY with biopsies;  Surgeon: Dinesh Meza MD;  Location: [facility] ENDOSCOPY;  Service:  Gastroenterology;  Laterality: N/A;  pre:  Iron deficiency anemia  post:  duodenitis and gastritis   • EYE SURGERY  12/09/2020    cataract surgery    • NEPHRECTOMY     • OTHER SURGICAL HISTORY      elbow surgery   • OTHER SURGICAL HISTORY      right arm surgery   • PROSTATE SURGERY      prostate removal   • THORACENTESIS Left     diagnostic      General Information     Row Name 02/02/23 0949          Physical Therapy Time and Intention    Document Type therapy note (daily note)  -CB     Mode of Treatment individual therapy;physical therapy  -CB     Row Name 02/02/23 0949          General Information    Existing Precautions/Restrictions fall  L knee brace for ambulation  -CB     Row Name 02/02/23 0949          Cognition    Orientation Status (Cognition) oriented x 3  -CB     Row Name 02/02/23 0949          Safety Issues, Functional Mobility    Impairments Affecting Function (Mobility) balance;endurance/activity tolerance;strength;range of motion (ROM)  -CB           User Key  (r) = Recorded By, (t) = Taken By, (c) = Cosigned By    Initials Name Provider Type    CB Brittnee Ocampo PT Physical Therapist               Mobility     Row Name 02/02/23 0949          Bed Mobility    Bed Mobility supine-sit  -CB     Supine-Sit [place] (Bed Mobility) contact guard;verbal cues  -CB     Assistive Device (Bed Mobility) bed rails;head of bed elevated  -CB     Row Name 02/02/23 0949          Sit-Stand Transfer    Sit-Stand [place] (Transfers) contact guard;verbal cues  -CB     Assistive Device (Sit-Stand Transfers) walker, front-wheeled  -CB     Row Name 02/02/23 0949          Gait/Stairs (Locomotion)    [place] Level (Gait) contact guard;verbal cues  -CB     Assistive Device (Gait) walker, front-wheeled  -CB     Distance in Feet (Gait) 3ft  -CB     Deviations/Abnormal Patterns (Gait) raul decreased;base of support, narrow;gait speed decreased;stride length decreased  -CB     Bilateral Gait Deviations forward  flexed posture  -CB     Left Sided Gait Deviations weight shift ability decreased  -CB     Comment, (Gait/Stairs) pt requested to get UIC today and only ambulated from bed to chair. no L knee buckling noted  -CB           User Key  (r) = Recorded By, (t) = Taken By, (c) = Cosigned By    Initials Name Provider Type    CB Brittnee Ocampo, PT Physical Therapist               Obj/Interventions    No documentation.                Goals/Plan    No documentation.                Clinical Impression     Row Name 02/02/23 0950          Pain    Pretreatment Pain Rating 0/10 - no pain  -CB     Posttreatment Pain Rating 0/10 - no pain  -CB     Row Name 02/02/23 0950          Plan of Care Review    Plan of Care Reviewed With patient;spouse  -CB     Progress improving  -CB     Outcome Evaluation Patient participated with PT this AM. He completed bed mobility and STS to rwx requiring CGA. He requested to sit UIC. He ambulated 3ft to chair using rwx with CGA. No L knee buckling noted and pt is steady on his feet. Plan home with spouse and dc.  -CB     Row Name 02/02/23 0950          Vital Signs    O2 Delivery Pre Treatment room air  -CB     O2 Delivery Intra Treatment room air  -CB     O2 Delivery Post Treatment room air  -CB     Row Name 02/02/23 0950          Positioning and Restraints    In Chair notified nsg;reclined;call light within reach;encouraged to call for assist;with family/caregiver;legs elevated  -CB           User Key  (r) = Recorded By, (t) = Taken By, (c) = Cosigned By    Initials Name Provider Type    Brittnee Martin, PT Physical Therapist               Outcome Measures     Row Name 02/02/23 0946          How much help from another person do you currently need...    Turning from your back to your side while in flat bed without using bedrails? 3  -CB     Moving from lying on back to sitting on the side of a flat bed without bedrails? 3  -CB     Moving to and from a bed to a chair (including a wheelchair)? 3  -CB      Standing up from a chair using your arms (e.g., wheelchair, bedside chair)? 3  -CB     Climbing 3-5 steps with a railing? 2  -CB     To walk in hospital room? 3  -CB     AM-PAC 6 Clicks Score (PT) 17  -CB     Highest level of mobility 5 --> Static standing  -CB     Row Name 02/02/23 0952          Functional Assessment    Outcome Measure Options AM-PAC 6 Clicks Basic Mobility (PT)  -CB           User Key  (r) = Recorded By, (t) = Taken By, (c) = Cosigned By    Initials Name Provider Type    CB Brittnee Ocampo, PT Physical Therapist                             Physical Therapy Education     Title: PT OT SLP Therapies (In Progress)     Topic: Physical Therapy (In Progress)     Point: Mobility training (Done)     Learning Progress Summary           Patient Acceptance, E,TB, VU,NR by  at 2/2/2023 0952    Acceptance, E,TB,D, VU,NR by  at 2/1/2023 1326    Acceptance, E,TB,D, VU,NR by  at 1/31/2023 1000                   Point: Home exercise program (Not Started)     Learner Progress:  Not documented in this visit.          Point: Body mechanics (Done)     Learning Progress Summary           Patient Acceptance, E,TB, VU,NR by  at 2/2/2023 0952    Acceptance, E,TB,D, VU,NR by  at 2/1/2023 1326    Acceptance, E,TB,D, VU,NR by  at 1/31/2023 1000                   Point: Precautions (Done)     Learning Progress Summary           Patient Acceptance, E,TB,D, VU,NR by  at 2/1/2023 1326    Acceptance, E,TB,D, VU,NR by  at 1/31/2023 1000                               User Key     Initials Effective Dates Name Provider Type Discipline     10/22/21 -  Brittnee Ocampo, PT Physical Therapist PT     04/08/22 -  Yvonne Navarro PT Physical Therapist PT              PT Recommendation and Plan     Plan of Care Reviewed With: patient, spouse  Progress: improving  Outcome Evaluation: Patient participated with PT this AM. He completed bed mobility and STS to rwx requiring CGA. He requested to sit UIC. He ambulated 3ft to  chair using rwx with CGA. No L knee buckling noted and pt is steady on his feet. Plan home with spouse and dc.     Time Calculation:    PT Charges     Row Name 02/02/23 0952             Time Calculation    Start Time 0842  -CB      Stop Time 0853  -CB      Time Calculation (min) 11 min  -CB      PT Received On 02/02/23  -CB      PT - Next Appointment 02/03/23  -CB         Time Calculation- PT    Total Timed Code Minutes- PT 11 minute(s)  -CB         Timed Charges    09798 - PT Therapeutic Activity Minutes 11  -CB         Total Minutes    Timed Charges Total Minutes 11  -CB       Total Minutes 11  -CB            User Key  (r) = Recorded By, (t) = Taken By, (c) = Cosigned By    Initials Name Provider Type    CB Brittnee Ocampo, PT Physical Therapist              Therapy Charges for Today     Code Description Service Date Service Provider Modifiers Qty    [id number]  PT THERAPEUTIC ACT EA 15 MIN 2/2/2023 Brittnee Ocampo, PT GP 1          PT G-Codes  Outcome Measure Options: AM-PAC 6 Clicks Basic Mobility (PT)  AM-PAC 6 Clicks Score (PT): 17  PT Discharge Summary  Anticipated Discharge Disposition (PT): home with assist, home with home health    Brittnee Ocampo, CICI  2/2/2023

## 2023-02-02 NOTE — PROGRESS NOTES
[facility] Clinical Pharmacy Services: Warfarin Dosing/Monitoring Consult    LAURA Sequeira is a 87 y.o. male, estimated creatinine clearance is 30.5 mL/min (A) (by C-G formula based on SCr of 1.63 mg/dL (H)). weighing 65 kg (143 lb 4.8 oz).    Results from last 7 days   Lab Units 02/02/23  0424 02/01/23  0918 02/01/23  0403 01/31/23  0357 01/30/23  0955 01/27/23  0000   INR  4.90*  --  4.99* 4.34* 3.48* 3.40   APTT seconds  --   --   --   --  69.5*  --    HEMOGLOBIN g/dL 10.4* 9.9*  --  10.1* 10.7*  --    HEMATOCRIT % 35.1* 32.3*  --  34.2* 35.3*  --    PLATELETS 10*3/mm3 181 156  --  154 140  --      Prior to admission anticoagulation: Warfarin 5 mg on Thursday and 7.5 mg all other days.     Hospital Anticoagulation:  Consulting provider: Dr. Bruce Mitchell  Start date: 1/30 (home medication)  Indication: A Fib - requiring full anticoagulation, Hx of mechanical aortic valve, Recent CVA  Target INR:  3.0-3.5 (patient had stroke with INR of 2.2 so INR goal adjusted)  Expected duration: Indefinite   Bridge Therapy: No    Potential food or drug interactions:   - Ceftriaxone- may enhance anticoagulant effect    Education complete?/Date: No; plan for follow up TBD    Assessment/Plan:  INR starting to trend down today but still supratherapeutic, continue to hold warfarin.  Will continue to watch and restart when appropriate.  Monitor for any signs or symptoms of bleeding  Follow up daily INRs and dose adjustments    Pharmacy will continue to follow until discharge or discontinuation of warfarin.     Temi Godoy PharmD, BCPS

## 2023-02-02 NOTE — PLAN OF CARE
"Goal Outcome Evaluation:  Plan of Care Reviewed With: patient        Progress: no change  Outcome Evaluation: Pt rested well. Checked BG around midnight, reading of 43. Gave patient ice cream. Pt was assymptomatic. Pt used his  and it read \"LO\" Pt states that he did not eat as much d/t NTL and chopped food. On recheck . This am reading 77. Random BS and reading only 16ml. Creat down a little this am.  CTM, safety maintained.  "

## 2023-02-02 NOTE — OUTREACH NOTE
Prep Survey    Flowsheet Row Responses   [facility] patient discharged from? [place]   Is LACE score < 7 ? No   Eligibility [facility]   Date of Admission 01/30/23   Date of Discharge 02/02/23   Discharge Disposition Home-Health Care Sv   Discharge diagnosis Pneumonia of right lower lobe due to infectious organism   Does the patient have one of the following disease processes/diagnoses(primary or secondary)? Pneumonia   Does the patient have Home health ordered? Yes   What is the Home health agency?  [facility]    Is there a DME ordered? No   Prep survey completed? Yes          AINSLEY PATRICIA - Registered Nurse

## 2023-02-02 NOTE — DISCHARGE SUMMARY
Patient Name: LAURA Sequeira  : 1935  MRN: [id number]    Date of Admission: 2023  Date of Discharge:  2023  Primary Care Physician: Celestine English DO      Chief Complaint:   Shortness of Breath and Cough      Discharge Diagnoses     Active Hospital Problems    Diagnosis  POA   • **Pneumonia of right lower lobe due to infectious organism [J18.9]  Yes   • Supratherapeutic INR [R79.1]  Yes   • History of CVA (cerebrovascular accident) [Z86.73]  Not Applicable   • Anemia [D64.9]  Yes   • History of Clostridium difficile infection [Z86.19]  Yes   • Chronic anticoagulation [Z79.01]  Not Applicable   • Stage 3b chronic kidney disease (HCC) [N18.32]  Yes   • History of aortic valve replacement with metallic valve [Z95.4]  Not Applicable   • Type 2 diabetes mellitus (HCC) [E11.9]  Yes   • Hypertension [I10]  Yes   • Atrial fibrillation (HCC) [I48.91]  Yes      Resolved Hospital Problems    Diagnosis Date Resolved POA   • BYRON (acute kidney injury) (HCC) [N17.9] 2023 Yes        Hospital Course     Mr. Sequeira is a 87 y.o. male with a history of CVA, atrial fibrillation, aortic valve replacement with metallic valve, DM2, HTN, chronic anemia that presented with cough and shortness of breath.  He was found to have pneumonia of the right lower lobe and A. fib with RVR on admission.  He has had an elevated troponin level in the setting of BYRON due to sepsis.  Patient was treated with IV Rocephin and supportive care for pneumonia.  Cardiology was also consulted given elevated troponin.  NSTEMI type II with no ischemic change on EKG and no chest pain.  His chest x-ray demonstrated fluid in the right fissure new started on diuretics per cardiology.  His creatinine has been elevated admission likely due to poor p.o. intake but increased further from baseline after diuresis.  Nephrology was consulted and does not recommend diuretic at discharge.  [facility] in the [facility]  setting.  Cardiology signed off yesterday due to stable cardiovascular issues with no other further work-up recommended.  Speech therapy does recommend a soft chopped with nonthickened liquids and meds with puréed due to oropharyngeal dysphagia.  Will be continued on a complete course of antibiotics at discharge.  He is stable on room air with no new complaints today and stable to go home with family.       Day of Discharge     Subjective:   No new complaints no CP SOB cough congestion fever or chills.     Physical Exam:  Temp:  [97.3 °F (36.3 °C)-98.2 °F (36.8 °C)] 97.3 °F (36.3 °C)  Heart Rate:  [82-88] 88  Resp:  [18] 18  BP: (115-135)/(59-80) 135/75  Body mass index is 19.14 kg/m².  Physical Exam  Vitals reviewed.   Constitutional:       Appearance: He is well-developed.   HENT:      Head: Normocephalic and atraumatic.   Cardiovascular:      Rate and Rhythm: Rhythm irregular.      Heart sounds: Murmur heard.   Pulmonary:      Effort: Pulmonary effort is normal. No respiratory distress.      Breath sounds: Normal breath sounds.   Abdominal:      General: Bowel sounds are normal. There is no distension.      Palpations: Abdomen is soft. There is no mass.      Tenderness: There is no abdominal tenderness.      Hernia: No hernia is present.   Musculoskeletal:      Right lower leg: No edema.      Left lower leg: No edema.   Skin:     General: Skin is warm and dry.   Neurological:      General: No focal deficit present.      Mental Status: He is alert. Mental status is at baseline.   Psychiatric:         Mood and Affect: Mood normal.         Behavior: Behavior normal.         Thought Content: Thought content normal.         Consultants     Consult Orders (all) (From admission, onward)     Start     Ordered    02/02/23 0343  Inpatient Diabetes Educator Consult  Once        Provider:  (Not yet assigned)    02/02/23 0343    02/01/23 1209  Inpatient Nephrology Consult  Once        Specialty:  Nephrology  Provider:   Darnell Whitaker MD    02/01/23 1210    01/30/23 1144  [facility] (on-call MD unless specified)  Once        Specialty:  Cardiology  Provider:  (Not yet assigned)    01/30/23 1144    01/30/23 1143  [facility] (on-call MD unless specified) Details  Once        Specialty:  Hospitalist  Provider:  (Not yet assigned)    01/30/23 1143              Procedures     * Surgery not found *      Imaging Results (All)     Procedure Component Value Units Date/Time    FL Video Swallow With Speech Single Contrast [[id number]] Collected: 02/01/23 1743     Updated: 02/01/23 2125    Narrative:      Video esophagram 02/01/2023     HISTORY: Dysphagia.     TECHNIQUE: Fluoroscopy was used during performance of the video  esophagram by speech pathology.     VFSS completed with Dr. Jorge. Pt exhibited moderate oropharyngeal  dysphagia characterized by swallow delay, decreased tongue base  strength, decreased hyolaryngeal excursion, decreased epiglottic  deflection, decreased airway closure, and decreased pharyngeal  constriction. Pt had shadowing noted in the laryngeal vestibule. With  cup sip of thin, silent penetration was noted during the swallow with  silent aspiration after the swallow. Pt's cued cough did not clear  aspirated material. Pt tolerated nectar thick (cup), pureed, and soft  solids with no penetration or aspiration. Mild to moderate diffuse  pharyngeal residuals were noted increasing risk of  penetration/aspiration. Pt needed a cue to swallow again and assist with  clearing. Pt had difficulty clearing dry/regular residuals from the  valleculae. A honey thick wash resulted in penetration. Pt had an  anterior bulging just below the cricopharyngeus (CP) at C5-7, question  osteophyte versus soft tissue change. Pt had a small diverticulum in the  upper esophagus that was difficult to see due to pt's shoulder. This  resulted in residue in the upper esophagus with no backflow noted  through the CP. Please see full speech pathology  report.     FLUOROSCOPY TIME: 4 minutes 41 seconds, 7237 images.     This report was finalized on 2/1/2023 9:22 PM by Dr. Onel Capps M.D.       XR Chest 1 View [[id number]] Collected: 02/01/23 0952     Updated: 02/01/23 0957    Narrative:      XR CHEST 1 VW-     HISTORY: Male who is 87 years-old,  pleural effusion     TECHNIQUE: Frontal view of the chest     COMPARISON: 01/31/2023     FINDINGS: Patient is rotated towards the right. The heart is enlarged.  Sternotomy wires are noted. Pulmonary vasculature is unremarkable.  Opacification of the right mid to lower hemithorax appears similar to  prior exam. No pneumothorax. No acute osseous process.       Impression:      No significant change.     This report was finalized on 2/1/2023 9:54 AM by Dr. Chava Luke M.D.       XR Chest 1 View [[id number]] Collected: 01/31/23 1308     Updated: 01/31/23 1314    Narrative:      XR CHEST 1 VW-     HISTORY: Male who is 87 years-old,  pneumonia     TECHNIQUE: Frontal view of the chest     COMPARISON: 01/30/2023     FINDINGS: Patient is rotated towards the right. The heart is enlarged.  Sternotomy wires are noted. Pulmonary vasculature is unremarkable.  Increased fluid is apparent at the minor fissure. Opacification of the  right mid to lower hemithorax otherwise appears similar to prior exam.  There may be minimal left pleural effusion. No pneumothorax. No acute  osseous process.          Impression:      Increased pleural effusion at the minor fissure. Otherwise, no  significant change.     This report was finalized on 1/31/2023 1:10 PM by Dr. Chava Luke M.D.       XR Chest 1 View [[id number]] Collected: 01/30/23 1020     Updated: 01/30/23 1025    Narrative:      XR CHEST 1 VW-     HISTORY: Male who is 87 years-old,  cough and fever, sepsis     TECHNIQUE: Frontal view of the chest     COMPARISON: 01/30/2023 at 0842 hours     FINDINGS: The heart is enlarged. Sternotomy wires are noted. Pulmonary  vasculature  is unremarkable. Opacification of the right mid to lower  hemithorax is increased, suggesting increased atelectasis/infiltrate and  moderate right pleural effusion. Minimal left pleural effusion is  apparent. No pneumothorax. No acute osseous process.       Impression:      Interval worsening, with increased opacification of the  right mid to lower hemithorax.     This report was finalized on 1/30/2023 10:22 AM by Dr. Chava Luke M.D.           Results for orders placed during the hospital encounter of 04/05/22    Duplex Venous Lower Extremity LEFT    Interpretation Summary  · Normal left lower extremity venous duplex scan.    Results for orders placed during the hospital encounter of 01/30/23    Adult Transthoracic Echo Complete W/ Cont if Necessary Per Protocol    Interpretation Summary  •  Left ventricular systolic function is low normal. Calculated left ventricular EF = 49.7%  •  Left ventricular wall thickness is consistent with borderline concentric hypertrophy.  •  Moderately reduced right ventricular systolic function noted.  •  Left atrial volume is moderately increased.  •  The right atrial cavity is moderate to severely  dilated.  •  Moderate to severe tricuspid valve regurgitation is present.  •  Estimated right ventricular systolic pressure from tricuspid regurgitation is mildly elevated (35-45 mmHg).  •  Mild pulmonary hypertension is present.  •  The aortic valve is not well visualized. There is a unknown type of mechanical aortic valve prosthesis of unknown size present. The aortic valve peak and mean gradients are within defined limits. The prosthetic aortic valve is grossly normal.    Pertinent Labs     Results from last 7 days   Lab Units 02/02/23  0424 02/01/23 0918 01/31/23  0357 01/30/23  0955   WBC 10*3/mm3 6.45 7.51 7.16 10.96*   HEMOGLOBIN g/dL 10.4* 9.9* 10.1* 10.7*   PLATELETS 10*3/mm3 181 156 154 140     Results from last 7 days   Lab Units 02/02/23  0424 02/01/23  0918  01/31/23  0357 01/30/23  0955   SODIUM mmol/L 140 141 139 138   POTASSIUM mmol/L 4.0 4.5 4.5 4.8   CHLORIDE mmol/L 106 107 107 104   CO2 mmol/L 22.9 25.0 21.8* 21.6*   BUN mg/dL 25* 29* 32* 34*   CREATININE mg/dL 1.49* 2.01* 1.63* 1.99*   GLUCOSE mg/dL 77 178* 131* 231*   EGFR mL/min/1.73 45.7* 31.9* 41.0* 32.3*     Results from last 7 days   Lab Units 02/02/23  0424 01/30/23  0955   ALBUMIN g/dL 2.8* 3.4*   BILIRUBIN mg/dL  --  0.9   ALK PHOS U/L  --  124*   AST (SGOT) U/L  --  23   ALT (SGPT) U/L  --  19     Results from last 7 days   Lab Units 02/02/23  0424 02/01/23  0918 01/31/23  0357 01/30/23  0955   CALCIUM mg/dL 8.5* 8.7 8.8 9.0   ALBUMIN g/dL 2.8*  --   --  3.4*   MAGNESIUM mg/dL  --   --  2.6*  --    PHOSPHORUS mg/dL 3.6  --  2.6  --        Results from last 7 days   Lab Units 01/31/23  0357 01/30/23  0955   TROPONIN T ng/mL 0.024 0.041*   PROBNP pg/mL  --  2,084.0*   DIGOXIN LVL ng/mL  --  0.70     Results from last 7 days   Lab Units 02/01/23  1452 02/01/23  0918   SODIUM UR mmol/L 126  --    CREATININE UR mg/dL 36.4  --    URIC ACID mg/dL  --  7.2*         Invalid input(s): LDLCALC  Results from last 7 days   Lab Units 01/30/23  1024 01/30/23  0955   BLOODCX  No growth at 3 days No growth at 3 days     Results from last 7 days   Lab Units 01/30/23  1032   COVID19  Not Detected       Test Results Pending at Discharge     Pending Labs     Order Current Status    Blood Culture - Blood, Arm, Left Preliminary result    Blood Culture - Blood, Arm, Right Preliminary result          Discharge Details        Discharge Medications      New Medications      Instructions Start Date   benzonatate 200 MG capsule  Commonly known as: TESSALON   200 mg, Oral, Every 8 Hours PRN      cefdinir 300 MG capsule  Commonly known as: OMNICEF   300 mg, Oral, 2 Times Daily   Start Date: February 3, 2023     guaiFENesin 600 MG 12 hr tablet  Commonly known as: MUCINEX   1,200 mg, Oral, Every 12 Hours Scheduled         Changes to  Medications      Instructions Start Date   ferrous gluconate 324 MG tablet  Commonly known as: FERGON  What changed: when to take this   TAKE ONE TABLET BY MOUTH EVERY OTHER DAY      warfarin 5 MG tablet  Commonly known as: COUMADIN  What changed: Another medication with the same name was changed. Make sure you understand how and when to take each.   7.5 mg, Oral, See Admin Instructions, Takes 1.5 tablets (7.5 mg) On Sundays, Mondays, Tuesdays, Wednesdays, Fridays and Saturdays      warfarin 5 MG tablet  Commonly known as: COUMADIN  What changed:   · how much to take  · how to take this  · when to take this  · additional instructions   Take one tablet by mouth on thurs and take one and one-half tablet by mouth all other days         Continue These Medications      Instructions Start Date   acetaminophen 325 MG tablet  Commonly known as: TYLENOL   650 mg, Oral, Every 6 Hours PRN      atorvastatin 40 MG tablet  Commonly known as: LIPITOR   TAKE ONE TABLET BY MOUTH DAILY      Digoxin 62.5 MCG tablet   62.5 mcg, Oral, Daily Digoxin      diphenhydrAMINE 25 mg capsule  Commonly known as: BENADRYL   25 mg, Oral, 2 Times Daily PRN      famotidine 20 MG tablet  Commonly known as: PEPCID   20 mg, Oral, Daily      insulin lispro 100 UNIT/ML injection  Commonly known as: humaLOG   Subcutaneous, Take As Directed, Use as directed with V-Go Pump--NOT TAKING DUE TO LOW BG LEVELS SINCE HOSPITALIZATION       magnesium oxide 400 MG tablet  Commonly known as: MAG-OX   400 mg, Oral, 2 Times Daily      metoprolol succinate XL 25 MG 24 hr tablet  Commonly known as: TOPROL-XL   12.5 mg, Oral, Daily      V-Go 40 kit   Wear each V-Go for one 24-hour period. At the end of the 24-hour period, retract the needle by sliding and then pushing the Needle Release Button and remove the V-Go from your body             Allergies   Allergen Reactions   • Penicillins Swelling   • Oxycodone-Acetaminophen Nausea And Vomiting   • Other Other (See Comments)      Grass, ragweed   • Percocet [Oxycodone-Acetaminophen] Nausea And Vomiting       Discharge Disposition:  Home or Self Care      Discharge Diet:  Diet Order   Procedures   • Diet: Diabetic Diets; Consistent Carbohydrate; Texture: Soft to Chew (NDD 3); Soft to Chew: Chopped Meat; Fluid Consistency: Nectar Thick       Discharge Activity:   Activity Instructions     Activity as Tolerated            CODE STATUS:    Code Status and Medical Interventions:   Ordered at: 01/31/23 1100     Code Status (Patient has no pulse and is not breathing):    CPR (Attempt to Resuscitate)     Medical Interventions (Patient has pulse or is breathing):    Full Support     Release to patient:    Routine Release       Future Appointments   Date Time Provider Department Center   2/9/2023  1:15 PM Caden Rubio DO [facility]   3/10/2023 10:00 AM Caden Rubio DO [facility]   8/4/2023  8:00 AM Griffin Fried MD [facility]     Additional Instructions for the Follow-ups that You Need to Schedule     Ambulatory Referral to Home Health   As directed      Face to Face Visit Date: 2/2/2023    Follow-up provider for Plan of Care?: I treated the patient in an acute care facility and will not continue treatment after discharge.    Follow-up provider: CADEN RUBIO [[id number]]    Reason/Clinical Findings: PNA    Describe mobility limitations that make leaving home difficult: impaired mobility and endurance    Nursing/Therapeutic Services Requested: Skilled Nursing Speech Therapy Physical Therapy    Skilled nursing orders: Other Medication education O2 instruction Cardiopulmonary assessments    PT orders: Therapeutic exercise Gait Training Strengthening Home safety assessment    Weight Bearing Status: As Tolerated    SLP orders: Dysphagia therapy    Frequency: Other            Contact information for follow-up providers     Caden Rubio DO. Go on 2/9/2023.    Specialty: Internal Medicine  Why: Appointment on Feb. 9, 2023 at 1:15  PM  Contact information:  [address]  [facility] [address]  [facility] [id number] [phone]             Shivani Santiago APRN. Go on 2/21/2023.    Specialty: Nephrology  Why: He has appoinment with Shivani DE JESUS. 2/21/23 3:40 pm. [facility].  Contact information:  [address]  [address]  [facility] [id number] [phone]                   Contact information for after-discharge care     Home Medical Care     [facility] .    Service: Home Health Services  Contact information:  [address]  [facility] [address] [phone]                             Additional Instructions for the Follow-ups that You Need to Schedule     Ambulatory Referral to Home Health   As directed      Face to Face Visit Date: 2/2/2023    Follow-up provider for Plan of Care?: I treated the patient in an acute care facility and will not continue treatment after discharge.    Follow-up provider: CADEN RUBIO [[id number]]    Reason/Clinical Findings: PNA    Describe mobility limitations that make leaving home difficult: impaired mobility and endurance    Nursing/Therapeutic Services Requested: Skilled Nursing Speech Therapy Physical Therapy    Skilled nursing orders: Other Medication education O2 instruction Cardiopulmonary assessments    PT orders: Therapeutic exercise Gait Training Strengthening Home safety assessment    Weight Bearing Status: As Tolerated    SLP orders: Dysphagia therapy    Frequency: Other           Time Spent on Discharge:  Greater than 52 minutes      LIZA Rizvi  [facility]  02/02/23  15:07 EST

## 2023-02-02 NOTE — PLAN OF CARE
Goal Outcome Evaluation:  Plan of Care Reviewed With: patient, spouse        Progress: improving  Outcome Evaluation: Patient participated with PT this AM. He completed bed mobility and STS to rwx requiring CGA. He requested to sit UIC. He ambulated 3ft to chair using rwx with CGA. No L knee buckling noted and pt is steady on his feet. Plan home with spouse and dc.

## 2023-02-02 NOTE — PAYOR COMM NOTE
"XieLAURA (87 y.o. Male)     Please see attached dc summary    REF#[id number]    THANK  YOU    NAVDEEP LIEBERMAN LPN CCP    Date of Birth   1935    Social Security Number       Address   [address] [place] [address]    Home Phone   [phone]    MRN   [id number]       Evangelical   Shinto    Marital Status                               Admission Date   1/30/23    Admission Type   Emergency    Admitting Provider   Bruce Mitchell MD    Attending Provider       Department, Room/Bed   [address], [address]/1       Discharge Date   2/2/2023    Discharge Disposition   Home or Self Care    Discharge Destination                               Attending Provider: (none)   Allergies: Penicillins, Oxycodone-acetaminophen, Other, Percocet [Oxycodone-acetaminophen]    Isolation: None   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)   Code Status: CPR    Ht: 183 cm (72.05\")   Wt: 64.1 kg (141 lb 5 oz)    Admission Cmt: None   Principal Problem: Pneumonia of right lower lobe due to infectious organism [J18.9]                 Active Insurance as of 1/30/2023     Primary Coverage     Payor Plan Insurance Group Employer/Plan Group    ANTHEM BLUE CROSS ANTHEM BLUE CROSS BLUE SHIELD PPO [id number]     Payor Plan Address Payor Plan Phone Number Payor Plan Fax Number Effective Dates    [address] [phone]  1/1/2021 - None Entered    [facility] [id number]       Subscriber Name Subscriber Birth Date Member ID       PATRICIA XIE 7/11/1955 [id number]           Secondary Coverage     Payor Plan Insurance Group Employer/Plan Group    MEDICARE MEDICARE A & B      Payor Plan Address Payor Plan Phone Number Payor Plan Fax Number Effective Dates    [address] [phone]  11/1/2002 - None Entered    [facility] [id number]       Subscriber Name Subscriber Birth Date Member ID       XIELAURA 1935 [id number]                 Emergency Contacts      (Rel.) Home " Phone Work Phone Mobile Phone    Gladys Sequeira (Spouse) [phone] -- [phone]    Bruce Silva (Son) [phone] -- [phone]            Discharge Order (From admission, onward)     Start     Ordered    02/02/23 0842  Discharge patient  Once        Comments: DC if ok with specialists   Expected Discharge Date: 02/02/23    Discharge Disposition: Home or Self Care    Physician of Record for Attribution - Please select from Treatment Team: REBECCA MASTERS [[id number]]    Review needed by CMO to determine Physician of Record: No       Question Answer Comment   Physician of Record for Attribution - Please select from Treatment Team REBECCA MASTERS    Review needed by CMO to determine Physician of Record No        02/02/23 0883

## 2023-02-02 NOTE — PROGRESS NOTES
[facility] will follow post hospital as requested. Patient/spouse agreeable to service. Contact information confirmed. Patient not currently receiving any home health services. Verbal order received from Vi barnes/ PCP, Dr. Celestine English, for home health care.

## 2023-02-02 NOTE — CASE MANAGEMENT/SOCIAL WORK
Case Management Discharge Note      Final Note: Return home with family assist and [facility]    Provided Post Acute Provider List?: N/A  N/A Provider List Comment: Agreeable to a referral to [facility]  Provided Post Acute Provider Quality & Resource List?: N/A  N/A Quality & Resource List Comment: Agreeable to a referral to [facility]    Selected Continued Care - Admitted Since 1/30/2023     Destination    No services have been selected for the patient.              Durable Medical Equipment    No services have been selected for the patient.              Dialysis/Infusion    No services have been selected for the patient.              Home Medical Care Coordination complete.    Service Provider Selected Services Address Phone Fax Patient Preferred     [facility] [address] [address] [phone] --          Therapy    No services have been selected for the patient.              Community Resources    No services have been selected for the patient.              Community & DME    No services have been selected for the patient.                  Transportation Services  Private: Car    Final Discharge Disposition Code: 06 - home with home health care

## 2023-02-02 NOTE — PROGRESS NOTES
[facility] Progress Note      Patient Name: LAURA Sequeira  : 1935  MRN: [id number]  Primary Care Physician:  Celestine English DO  Date of admission: 2023    Subjective     Interval History:   Follow up BYRON on CKDIII.  Feels better. Some occas cough.  Denies soa.  Eating. Urinating. Plan for dc today. Wife at bedside.     Review of Systems:   As noted above    Objective     Vitals:   Temp:  [97.5 °F (36.4 °C)-98.2 °F (36.8 °C)] 97.5 °F (36.4 °C)  Heart Rate:  [82-95] 88  Resp:  [18] 18  BP: (115-136)/(59-80) 132/80    Intake/Output Summary (Last 24 hours) at 2023 1246  Last data filed at 2023 1014  Gross per 24 hour   Intake 420 ml   Output 1620 ml   Net -1200 ml       Physical Exam:    General Appearance: alert, oriented x 3, no acute distress . [place].   Skin: warm and dry  HEENT: oral mucosa normal, nonicteric sclera  Neck: supple, no JVD  Lungs:Rales Right base with decreased bs RLL, RML.  Heart: Irreg, irreg, Mech AVR  valve click  Abdomen: soft, nontender, nondistended. +bs  : no palpable bladder  Extremities: no edema.  Neuro: normal speech and mental status     Scheduled Meds:     atorvastatin, 40 mg, Oral, Daily  cefTRIAXone, 1 g, Intravenous, Q24H  digoxin, 62.5 mcg, Oral, Daily  famotidine, 20 mg, Oral, Daily  ferrous sulfate, 325 mg, Oral, Every Other Day  guaiFENesin, 1,200 mg, Oral, Q12H  magnesium oxide, 400 mg, Oral, BID  metoprolol succinate XL, 25 mg, Oral, Daily  mupirocin, 1 application, Topical, Q12H  sodium chloride, 10 mL, Intravenous, Q12H      IV Meds:   Pharmacy to dose warfarin,         Results Reviewed:   I have personally reviewed the results from the time of this admission to 2023 12:46 EST     Results from last 7 days   Lab Units 23  0424 23  0918 23  0357 23  0955   SODIUM mmol/L 140 141 139 138   POTASSIUM mmol/L 4.0 4.5 4.5 4.8   CHLORIDE mmol/L 106 107 107 104   CO2 mmol/L 22.9 25.0 21.8* 21.6*   BUN mg/dL  25* 29* 32* 34*   CREATININE mg/dL 1.49* 2.01* 1.63* 1.99*   CALCIUM mg/dL 8.5* 8.7 8.8 9.0   BILIRUBIN mg/dL  --   --   --  0.9   ALK PHOS U/L  --   --   --  124*   ALT (SGPT) U/L  --   --   --  19   AST (SGOT) U/L  --   --   --  23   GLUCOSE mg/dL 77 178* 131* 231*       Estimated Creatinine Clearance: 32.9 mL/min (A) (by C-G formula based on SCr of 1.49 mg/dL (H)).    Results from last 7 days   Lab Units 02/02/23  0424 01/31/23  0357   MAGNESIUM mg/dL  --  2.6*   PHOSPHORUS mg/dL 3.6 2.6       Results from last 7 days   Lab Units 02/01/23  0918   URIC ACID mg/dL 7.2*       Results from last 7 days   Lab Units 02/02/23  0424 02/01/23  0918 01/31/23  0357 01/30/23  0955   WBC 10*3/mm3 6.45 7.51 7.16 10.96*   HEMOGLOBIN g/dL 10.4* 9.9* 10.1* 10.7*   PLATELETS 10*3/mm3 181 156 154 140       Results from last 7 days   Lab Units 02/02/23  0424 02/01/23  0403 01/31/23  0357 01/30/23  0955 01/27/23  0000   INR  4.90* 4.99* 4.34* 3.48* 3.40       Assessment / Plan     ASSESSMENT:  1. Arlet on CKD III.  Creatinine improved today.  Volume status ok by exam.  Pleural effusion on right assoc with PNA.  No diuretic on dc.  2. RLL PNA going home on po antibiotic.  3. Anemia  4. HTN   Controlled.   5. Mechanical AVR  6. Persistent AFib.   PLAN:  1. Ok for dc.  2. Has follow up with [facility] Feb. 21 at 3:40 pm.   3. DW patient and wife.     Silvia Law MD  02/02/23  12:46 EST    [facility]  [phone]

## 2023-02-03 ENCOUNTER — TRANSITIONAL CARE MANAGEMENT TELEPHONE ENCOUNTER (OUTPATIENT)
Dept: CALL CENTER | Facility: HOSPITAL | Age: 88
End: 2023-02-03
Payer: MEDICARE

## 2023-02-03 ENCOUNTER — HOME CARE VISIT (OUTPATIENT)
Dept: HOME HEALTH SERVICES | Facility: HOME HEALTHCARE | Age: 88
End: 2023-02-03
Payer: COMMERCIAL

## 2023-02-03 ENCOUNTER — ANTICOAGULATION VISIT (OUTPATIENT)
Dept: PHARMACY | Facility: HOSPITAL | Age: 88
End: 2023-02-03
Payer: MEDICARE

## 2023-02-03 VITALS
OXYGEN SATURATION: 100 % | SYSTOLIC BLOOD PRESSURE: 136 MMHG | DIASTOLIC BLOOD PRESSURE: 66 MMHG | HEART RATE: 72 BPM | RESPIRATION RATE: 18 BRPM | TEMPERATURE: 98.7 F

## 2023-02-03 DIAGNOSIS — I63.411 CEREBROVASCULAR ACCIDENT (CVA) DUE TO EMBOLISM OF RIGHT MIDDLE CEREBRAL ARTERY: ICD-10-CM

## 2023-02-03 DIAGNOSIS — Z95.4 HISTORY OF AORTIC VALVE REPLACEMENT WITH METALLIC VALVE: Primary | ICD-10-CM

## 2023-02-03 LAB — INR PPP: 5.4

## 2023-02-03 PROCEDURE — G0299 HHS/HOSPICE OF RN EA 15 MIN: HCPCS

## 2023-02-03 NOTE — PROGRESS NOTES
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]             Anticoagulation Episode Summary     INR check location:      Preferred lab:      Send INR reminders to:   [facility]  POOL    Comments:  New INR goal 3 - 3.5 (see 2020 hospitalization for CVA)      Anticoagulation Care Providers     Provider Role Specialty Phone number    Griffin Fried MD Referring Cardiology [phone]          Clinic Interview:  Patient Findings     Positives:  Change in health, Change in medications, Hospital admission    Negatives:  Signs/symptoms of thrombosis, Signs/symptoms of bleeding,   Laboratory test error suspected, Change in alcohol use, Change in   activity, Upcoming invasive procedure, Emergency department visit,   Upcoming dental procedure, Missed doses, Extra doses, Change in   diet/appetite, Bruising, Other complaints    Comments:  admitted for PNA from -- started on ceftriaxone per   MAR. Discharged on cefdinir x 3 days on . No steroids documented in MAR   so unclear why INR keeps rising. Patients spouse is going to test INR on   .     Clinical Outcomes     Negatives:  Major bleeding event, Thromboembolic event,   Anticoagulation-related hospital admission, Anticoagulation-related ED   visit, Anticoagulation-related fatality    Comments:  admitted for PNA from -- started on ceftriaxone per   MAR. Discharged on  cefdinir x 3 days on 2/2. No steroids documented in MAR   so unclear why INR keeps rising.    INR History:  Anticoagulation Monitoring 1/20/2023 1/27/2023 2/3/2023   INR 3.30 3.40 5.40   INR Date 1/20/2023 1/27/2023 2/3/2023   INR Goal 3.0-3.5 3.0-3.5 3.0-3.5   Trend Same Same Same   Last Week Total 50 mg 50 mg 30 mg   Next Week Total 50 mg 50 mg 35 mg   Sun 7.5 mg 7.5 mg 7.5 mg   Mon 7.5 mg 7.5 mg 7.5 mg   Tue 7.5 mg 7.5 mg 7.5 mg   Wed 7.5 mg 7.5 mg 7.5 mg   Thu 5 mg 5 mg 5 mg   Fri 7.5 mg 7.5 mg Hold (2/3)   Sat 7.5 mg 7.5 mg Hold (2/4)   Visit Report - - -   Some recent data might be hidden       Plan:  1. INR is Supratherapeutic today- see above in Anticoagulation Summary.   Will instruct LAURA Sequeira to Change their warfarin regimen- see above in Anticoagulation Summary.     Patients spouse is going to test INR on   Sunday. If INR is >4.0 she is going to hold another dose, but if it is   less than 4.0 she was recommended to give him 7.5 mg on Sunday and we will   follow up on Monday.         2. Follow up on Monday   3. Pt has agreed to only be called if INR out of range. They have been instructed to call if any changes in medications, doses, concerns, etc. Patient expresses understanding and has no further questions at this time.    Michelle Piña, [facility]

## 2023-02-03 NOTE — HOME HEALTH
Patient is an 87M with history of CVA, a.fib, valve replacement, DM2, HTN, chronic anemia.  Presented to ED with progressive shortness of breath and found to have pneumonia of right lower lobe and a. fib with RVR.  Focus of care will continue to be related to pnemonia.  Did have some diuresis in hospital for fluid accumulation but nephrology did not reccomened continuing at discharge and would like him to follow up outpatient.  Speech reccomends soft chopped, nonthickened liquids and meds with pureed consistency, but patient eating normally.  Patient does have wound on back they they are rinsing with saline, applying mupirocin and bandage to 2xdaily per Dr. Mclean, wife is good with managing this.  CP assessment WNL.  All meds in home per spouse, she said she is going by discharge list, but she did not want me to go through the med bottles.   Also states they check the INR by themselves at home and send it to med management clinic.  Would like to continue to see patient weekly for two more weeks to monitor wound, CP assessments, pneumonia education and med management.

## 2023-02-03 NOTE — OUTREACH NOTE
Call Center TCM Note    Flowsheet Row Responses   [facility] [place] patient discharged from? [facility]   Does the patient have one of the following disease processes/diagnoses(primary or secondary)? Pneumonia   TCM attempt successful? Yes   Call start time 1154   Call end time 1157   Discharge diagnosis Pneumonia of right lower lobe due to infectious organism   Is patient permission given to speak with other caregiver? Yes   List who call center can speak with PATRICIA XIE   Person spoke with today (if not patient) and relationship PATRICIA XIE- WIFE   Meds reviewed with patient/caregiver? Yes   Is the patient having any side effects they believe may be caused by any medication additions or changes? No   Does the patient have all medications ordered at discharge? Yes   Is the patient taking all medications as directed (includes completed medication regime)? Yes   Does the patient have an appointment with their PCP within 7 days of discharge? Yes   What is the Home health agency?  [facility]    Has home health visited the patient within 72 hours of discharge? Yes   Home health comments  NURSE WAS IN THE HOME AT THE TIME OF THE CALL   Pulse Ox monitoring None   Psychosocial issues? No   Did the patient receive a copy of their discharge instructions? Yes   Nursing interventions Reviewed instructions with patient   What is the patient's perception of their health status since discharge? Improving   Nursing Interventions Nurse provided patient education   If the patient is a current smoker, are they able to teach back resources for cessation? Not a smoker   Is the patient/caregiver able to teach back the hierarchy of who to call/visit for symptoms/problems? PCP, Specialist, Home health nurse, Urgent Care, ED, 911 Yes   Is the patient/caregiver able to teach back signs and symptoms of worsening condition: Fever/chills, Shortness of breath, Chest pain   Is the patient/caregiver able to teach back importance of  completing antibiotic course of treatment? Yes   TCM call completed? Yes   Call end time 6136   Would this patient benefit from a Referral to [facility] Social Work? No   Is the patient interested in additional calls from an ambulatory ?  NOTE:  applies to high risk patients requiring additional follow-up. No          Sidra Sanabria LPN    2/3/2023, 11:58 EST

## 2023-02-04 LAB
BACTERIA SPEC AEROBE CULT: NORMAL
BACTERIA SPEC AEROBE CULT: NORMAL

## 2023-02-05 LAB — INR PPP: 2.3

## 2023-02-06 ENCOUNTER — ANTICOAGULATION VISIT (OUTPATIENT)
Dept: PHARMACY | Facility: HOSPITAL | Age: 88
End: 2023-02-06
Payer: MEDICARE

## 2023-02-06 ENCOUNTER — HOME CARE VISIT (OUTPATIENT)
Dept: HOME HEALTH SERVICES | Facility: HOME HEALTHCARE | Age: 88
End: 2023-02-06
Payer: COMMERCIAL

## 2023-02-06 VITALS
SYSTOLIC BLOOD PRESSURE: 126 MMHG | OXYGEN SATURATION: 98 % | DIASTOLIC BLOOD PRESSURE: 74 MMHG | HEART RATE: 53 BPM | TEMPERATURE: 98.3 F

## 2023-02-06 DIAGNOSIS — I63.411 CEREBROVASCULAR ACCIDENT (CVA) DUE TO EMBOLISM OF RIGHT MIDDLE CEREBRAL ARTERY: ICD-10-CM

## 2023-02-06 DIAGNOSIS — Z95.4 HISTORY OF AORTIC VALVE REPLACEMENT WITH METALLIC VALVE: Primary | ICD-10-CM

## 2023-02-06 PROCEDURE — G0151 HHCP-SERV OF PT,EA 15 MIN: HCPCS

## 2023-02-06 NOTE — PROGRESS NOTES
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]             Anticoagulation Episode Summary     INR check location:      Preferred lab:      Send INR reminders to:  [facility]  POOL    Comments:  New INR goal 3 - 3.5 (see 2020 hospitalization for CVA)      Anticoagulation Care Providers     Provider Role Specialty Phone number    Griffin Fried MD Referring Cardiology [phone]          Clinic Interview:  Patient Findings     Negatives:  Signs/symptoms of thrombosis, Signs/symptoms of bleeding,   Laboratory test error suspected, Change in health, Change in alcohol use,   Change in activity, Upcoming invasive procedure, Emergency department   visit, Upcoming dental procedure, Missed doses, Extra doses, Change in   medications, Change in diet/appetite, Hospital admission, Bruising, Other   complaints      Clinical Outcomes     Negatives:  Major bleeding event, Thromboembolic event,   Anticoagulation-related hospital admission, Anticoagulation-related ED   visit, Anticoagulation-related fatality        INR History:  Anticoagulation Monitoring 2023 2/3/2023 2023   INR 3.40 5.40 2.30   INR Date 2023 2/3/2023 2023   INR Goal 3.0-3.5 3.0-3.5 3.0-3.5   Trend Same Same Same   Last Week Total 50 mg 30 mg 15 mg   Next Week Total 50 mg 35 mg 55 mg   Sun 7.5 mg 7.5 mg -   Mon 7.5 mg - 10 mg ()   Tue 7.5 mg - 10  mg (2/7)   Wed 7.5 mg - -   Thu 5 mg - -   Fri 7.5 mg Hold (2/3) -   Sat 7.5 mg Hold (2/4) -   Visit Report - - -   Some recent data might be hidden       Plan:  1. INR is Subtherapeutic today- see above in Anticoagulation Summary.   Will instruct LAURA Sequeira to Change their warfarin regimen- see above in Anticoagulation Summary.  Take 10 mg today and tomorrow then resume previous weekly dosing until next INR.   2. Follow up in 2 days   3. Spoke to patient's wife, Gladys. They have been instructed to call if any changes in medications, doses, concerns, etc. Patient expresses understanding and has no further questions at this time.    Carmen El, PharmD

## 2023-02-06 NOTE — HOME HEALTH
"Patient is 87 year old male recently hospitalized due to pneumonia after presenting to immediate care/emergency room with complaints of shortness of air.  Patient and wife/primary caregiver report weakness since hospitalization. Patient has been known to home health in recent past due to multiple comorbidities.     Prior functional level: self propelling in wheelchair around kitchen, hallway and living room.  Transferring to and from recliner/toilet and wheelchair with supervision/contact guard assist. Ambulating with knee locking brace, walker and assistance in home and down two steps to exit home.    Past medical history: CVA, atrial fibrillation, valve replacement, diabetes, hypertension    Patient goal:  \"To improve my walking.  Work on my quad strength.\"    4 minutes of walking 84% O2 saturation on room air and heart rate 72  55 seconds 5 time sit to stand O2 Saturation 99% on room air    Plan for Next Visit:  -Review seated and standing home program, increase repetition tolerance and emphasize quad strength/control  -Address posture and safety with ambulation while in brace, with assist and in walker  -Conditioning with pursed lip breathing, increased activity tolerance"

## 2023-02-08 ENCOUNTER — ANTICOAGULATION VISIT (OUTPATIENT)
Dept: PHARMACY | Facility: HOSPITAL | Age: 88
End: 2023-02-08
Payer: MEDICARE

## 2023-02-08 DIAGNOSIS — Z95.4 HISTORY OF AORTIC VALVE REPLACEMENT WITH METALLIC VALVE: Primary | ICD-10-CM

## 2023-02-08 DIAGNOSIS — I63.411 CEREBROVASCULAR ACCIDENT (CVA) DUE TO EMBOLISM OF RIGHT MIDDLE CEREBRAL ARTERY: ICD-10-CM

## 2023-02-08 LAB — INR PPP: 4

## 2023-02-08 NOTE — PROGRESS NOTES
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]             Anticoagulation Episode Summary     INR check location:      Preferred lab:      Send INR reminders to:  [facility]  POOL    Comments:  New INR goal 3 - 3.5 (see 2020 hospitalization for CVA)      Anticoagulation Care Providers     Provider Role Specialty Phone number    Griffin Fried MD Referring Cardiology [phone]          Clinic Interview:  Patient Findings     Negatives:  Signs/symptoms of thrombosis, Signs/symptoms of bleeding,   Laboratory test error suspected, Change in health, Change in alcohol use,   Change in activity, Upcoming invasive procedure, Emergency department   visit, Upcoming dental procedure, Missed doses, Extra doses, Change in   medications, Change in diet/appetite, Hospital admission, Bruising, Other   complaints      Clinical Outcomes     Negatives:  Major bleeding event, Thromboembolic event,   Anticoagulation-related hospital admission, Anticoagulation-related ED   visit, Anticoagulation-related fatality        INR History:  Anticoagulation Monitoring 2/3/2023 2023 2023   INR 5.40 2.30 4.00   INR Date 2/3/2023 2023 2023   INR Goal 3.0-3.5 3.0-3.5 3.0-3.5   Trend Same Same Same   Last Week Total 30 mg 15 mg 27.5 mg   Next Week Total 35 mg 55 mg 47.5 mg   Sun 7.5 mg - -   Mon - 10 mg () -   Tue - 10 mg () -    Wed - - 5 mg (2/8)   Thu - - 5 mg   Fri Hold (2/3) - -   Sat Hold (2/4) - -   Visit Report - - -   Some recent data might be hidden       Plan:  1. INR is Supratherapeutic today- see above in Anticoagulation Summary.   Will instruct LAURA Sequeira to Change their warfarin regimen- see above in Anticoagulation Summary.  2. Follow up in 2 days   3.  They have been instructed to call if any changes in medications, doses, concerns, etc. Patient expresses understanding and has no further questions at this time.    Michelle Piña, [facility]

## 2023-02-09 ENCOUNTER — LAB (OUTPATIENT)
Dept: LAB | Facility: HOSPITAL | Age: 88
End: 2023-02-09
Payer: COMMERCIAL

## 2023-02-09 ENCOUNTER — OFFICE VISIT (OUTPATIENT)
Dept: INTERNAL MEDICINE | Facility: CLINIC | Age: 88
End: 2023-02-09
Payer: COMMERCIAL

## 2023-02-09 ENCOUNTER — HOME CARE VISIT (OUTPATIENT)
Dept: HOME HEALTH SERVICES | Facility: HOME HEALTHCARE | Age: 88
End: 2023-02-09
Payer: COMMERCIAL

## 2023-02-09 ENCOUNTER — HOSPITAL ENCOUNTER (OUTPATIENT)
Dept: GENERAL RADIOLOGY | Facility: HOSPITAL | Age: 88
Discharge: HOME | End: 2023-02-09
Payer: COMMERCIAL

## 2023-02-09 VITALS
HEART RATE: 81 BPM | DIASTOLIC BLOOD PRESSURE: 72 MMHG | SYSTOLIC BLOOD PRESSURE: 130 MMHG | TEMPERATURE: 97.7 F | OXYGEN SATURATION: 94 %

## 2023-02-09 VITALS
HEIGHT: 72 IN | SYSTOLIC BLOOD PRESSURE: 138 MMHG | TEMPERATURE: 98 F | DIASTOLIC BLOOD PRESSURE: 80 MMHG | HEART RATE: 67 BPM | WEIGHT: 166 LBS | BODY MASS INDEX: 22.48 KG/M2 | OXYGEN SATURATION: 94 %

## 2023-02-09 DIAGNOSIS — N18.32 ACUTE RENAL FAILURE SUPERIMPOSED ON STAGE 3B CHRONIC KIDNEY DISEASE, UNSPECIFIED ACUTE RENAL FAILURE TYPE: ICD-10-CM

## 2023-02-09 DIAGNOSIS — J90 RECURRENT PLEURAL EFFUSION ON RIGHT: ICD-10-CM

## 2023-02-09 DIAGNOSIS — I21.4 NSTEMI (NON-ST ELEVATED MYOCARDIAL INFARCTION): ICD-10-CM

## 2023-02-09 DIAGNOSIS — N17.9 ACUTE RENAL FAILURE SUPERIMPOSED ON STAGE 3B CHRONIC KIDNEY DISEASE, UNSPECIFIED ACUTE RENAL FAILURE TYPE: ICD-10-CM

## 2023-02-09 DIAGNOSIS — Z09 HOSPITAL DISCHARGE FOLLOW-UP: Primary | ICD-10-CM

## 2023-02-09 DIAGNOSIS — J18.9 PNEUMONIA OF RIGHT LOWER LOBE DUE TO INFECTIOUS ORGANISM: ICD-10-CM

## 2023-02-09 DIAGNOSIS — I48.0 PAROXYSMAL ATRIAL FIBRILLATION: ICD-10-CM

## 2023-02-09 LAB
ANION GAP SERPL CALCULATED.3IONS-SCNC: 9.6 MMOL/L (ref 5–15)
BASOPHILS # BLD AUTO: 0.04 10*3/MM3 (ref 0–0.2)
BASOPHILS NFR BLD AUTO: 0.5 % (ref 0–1.5)
BUN SERPL-MCNC: 29 MG/DL (ref 8–23)
BUN/CREAT SERPL: 19 (ref 7–25)
CALCIUM SPEC-SCNC: 9.1 MG/DL (ref 8.6–10.5)
CHLORIDE SERPL-SCNC: 104 MMOL/L (ref 98–107)
CO2 SERPL-SCNC: 26.4 MMOL/L (ref 22–29)
CREAT SERPL-MCNC: 1.53 MG/DL (ref 0.76–1.27)
DEPRECATED RDW RBC AUTO: 43.7 FL (ref 37–54)
EGFRCR SERPLBLD CKD-EPI 2021: 44.3 ML/MIN/1.73
EOSINOPHIL # BLD AUTO: 0.19 10*3/MM3 (ref 0–0.4)
EOSINOPHIL NFR BLD AUTO: 2.5 % (ref 0.3–6.2)
ERYTHROCYTE [DISTWIDTH] IN BLOOD BY AUTOMATED COUNT: 15.5 % (ref 12.3–15.4)
GLUCOSE SERPL-MCNC: 166 MG/DL (ref 65–99)
HCT VFR BLD AUTO: 38.1 % (ref 37.5–51)
HGB BLD-MCNC: 11.2 G/DL (ref 13–17.7)
IMM GRANULOCYTES # BLD AUTO: 0.08 10*3/MM3 (ref 0–0.05)
IMM GRANULOCYTES NFR BLD AUTO: 1.1 % (ref 0–0.5)
LYMPHOCYTES # BLD AUTO: 1.05 10*3/MM3 (ref 0.7–3.1)
LYMPHOCYTES NFR BLD AUTO: 14 % (ref 19.6–45.3)
MCH RBC QN AUTO: 23.3 PG (ref 26.6–33)
MCHC RBC AUTO-ENTMCNC: 29.4 G/DL (ref 31.5–35.7)
MCV RBC AUTO: 79.4 FL (ref 79–97)
MONOCYTES # BLD AUTO: 0.43 10*3/MM3 (ref 0.1–0.9)
MONOCYTES NFR BLD AUTO: 5.7 % (ref 5–12)
NEUTROPHILS NFR BLD AUTO: 5.73 10*3/MM3 (ref 1.7–7)
NEUTROPHILS NFR BLD AUTO: 76.2 % (ref 42.7–76)
NRBC BLD AUTO-RTO: 0 /100 WBC (ref 0–0.2)
PLATELET # BLD AUTO: 248 10*3/MM3 (ref 140–450)
PMV BLD AUTO: 10.9 FL (ref 6–12)
POTASSIUM SERPL-SCNC: 5.7 MMOL/L (ref 3.5–5.2)
RBC # BLD AUTO: 4.8 10*6/MM3 (ref 4.14–5.8)
SODIUM SERPL-SCNC: 140 MMOL/L (ref 136–145)
WBC NRBC COR # BLD: 7.52 10*3/MM3 (ref 3.4–10.8)

## 2023-02-09 PROCEDURE — 80048 BASIC METABOLIC PNL TOTAL CA: CPT | Performed by: STUDENT IN AN ORGANIZED HEALTH CARE EDUCATION/TRAINING PROGRAM

## 2023-02-09 PROCEDURE — 71046 X-RAY EXAM CHEST 2 VIEWS: CPT

## 2023-02-09 PROCEDURE — 99495 TRANSJ CARE MGMT MOD F2F 14D: CPT | Performed by: STUDENT IN AN ORGANIZED HEALTH CARE EDUCATION/TRAINING PROGRAM

## 2023-02-09 PROCEDURE — 36415 COLL VENOUS BLD VENIPUNCTURE: CPT | Performed by: STUDENT IN AN ORGANIZED HEALTH CARE EDUCATION/TRAINING PROGRAM

## 2023-02-09 PROCEDURE — G0153 HHCP-SVS OF S/L PATH,EA 15MN: HCPCS

## 2023-02-09 PROCEDURE — 85025 COMPLETE CBC W/AUTO DIFF WBC: CPT | Performed by: STUDENT IN AN ORGANIZED HEALTH CARE EDUCATION/TRAINING PROGRAM

## 2023-02-09 PROCEDURE — 1111F DSCHRG MED/CURRENT MED MERGE: CPT | Performed by: STUDENT IN AN ORGANIZED HEALTH CARE EDUCATION/TRAINING PROGRAM

## 2023-02-09 NOTE — PROGRESS NOTES
"Transitional Care Follow Up Visit  Subjective     LAURA Sequeira is a 87 y.o. male who presents for a transitional care management visit.    Within 48 business hours after discharge our office contacted him via telephone to coordinate his care and needs.      I reviewed and discussed the details of that call along with the discharge summary, hospital problems, inpatient lab results, inpatient diagnostic studies, and consultation reports with LAURA.     Current outpatient and discharge medications have been reconciled for the patient.  Reviewed by: Celestine English DO      Date of TCM Phone Call 2/2/2023   [facility]   Date of Admission 1/30/2023   Date of Discharge 2/2/2023   Discharge Disposition [facility]     Risk for Readmission (LACE) Score: 12 (2/2/2023  6:00 AM)      History of Present Illness   Course During Hospital Stay:   \"Mr. Sequeira is a 87 y.o. male with a history of CVA, atrial fibrillation, aortic valve replacement with metallic valve, DM2, HTN, chronic anemia that presented with cough and shortness of breath.  He was found to have pneumonia of the right lower lobe and A. fib with RVR on admission.  He has had an elevated troponin level in the setting of BYRON due to sepsis.  Patient was treated with IV Rocephin and supportive care for pneumonia.  Cardiology was also consulted given elevated troponin.  NSTEMI type II with no ischemic change on EKG and no chest pain.  His chest x-ray demonstrated fluid in the right fissure new started on diuretics per cardiology.  His creatinine has been elevated admission likely due to poor p.o. intake but increased further from baseline after diuresis.  Nephrology was consulted and does not recommend diuretic at discharge.  He will [facility] with [facility] in the outpatient setting.  Cardiology signed off yesterday due to stable cardiovascular issues with no other further work-up recommended.  Speech therapy does recommend a " "soft chopped with nonthickened liquids and meds with puréed due to oropharyngeal dysphagia.  Will be continued on a complete course of antibiotics at discharge.  He is stable on room air with no new complaints today and stable to go home with family.\"    Breathing feels \"fine\". He finished outpatient antibiotics. He is receiving home health speech and physical therapy.   No fever or chills since discharge.   Feels like he is urinating normally and it is not dark yellow like it was.     The following portions of the patient's history were reviewed and updated as appropriate: allergies, current medications, past family history, past medical history, past social history, past surgical history and problem list.      Objective   Physical Exam  Vitals reviewed.   Constitutional:       General: He is not in acute distress.     Appearance: Normal appearance. He is normal weight. He is ill-appearing (chronically).   HENT:      Head: Atraumatic.   Eyes:      General: No scleral icterus.  Cardiovascular:      Rate and Rhythm: Normal rate. Rhythm irregular.      Comments: Mechanical valve click present  Pulmonary:      Effort: Pulmonary effort is normal.      Breath sounds: No wheezing.      Comments: Decreased breath sounds on the right; crackles left lung base  Musculoskeletal:         General: Deformity: trace pretibial.      Right lower leg: Edema present.      Left lower leg: Edema (trace  pretibial) present.      Comments: Ambulating in wheechair   Skin:     Coloration: Skin is not jaundiced.   Neurological:      Mental Status: He is alert.   Psychiatric:         Mood and Affect: Mood normal.         Behavior: Behavior normal.         Thought Content: Thought content normal.         Assessment & Plan   Diagnoses and all orders for this visit:    1. Hospital discharge follow-up (Primary)  2. Paroxysmal atrial fibrillation (HCC)  3. Pneumonia of right lower lobe due to infectious organism  4. NSTEMI (non-ST elevated " "myocardial infarction) (HCC)  5. Acute renal failure superimposed on stage 3b chronic kidney disease, unspecified acute renal failure type (HCC)  6. Recurrent pleural effusion on right  -discharge summary reviewed , consultant notes reviewed, hospital imaging studies reviewed  -here with his wife who helps provide history  -overall he is feeling better, has completed antibiotic therapy as an outpatient and has no more symptoms of illness  -review of vitals show he is slightly hypertensive, afebrile, no acute distress, satting 94% on room air which is slightly below his baseline  -lung exam reveals decreased lung sounds on the right consistent with chronic recurrent pleural effusion as well as left sided crackles   -family reports he has undergone multiple thoracenteses , always while in the hospital   -review of chart shows negative cytology for malignant cells when he last had thoracentesis in 2022   -chest xray 2/1/23 showed \"Opacification of the right mid to lower hemithorax appears similar to  prior exam.\"  -given recurrent nature of his pleural effusion, I will refer him to pulmonology for continued eval and treatment   -at this time he does not have respiratory distress and O2 sat is satisfactory so he is safe for continued outpatient care   -will repeat CBC and BMP today  -will repeat chest xray today  Lab Results   Component Value Date    WBC 6.45 02/02/2023    HGB 10.4 (L) 02/02/2023    HCT 35.1 (L) 02/02/2023    MCV 78.5 (L) 02/02/2023     02/02/2023     Lab Results   Component Value Date    GLUCOSE 77 02/02/2023    BUN 25 (H) 02/02/2023    CREATININE 1.49 (H) 02/02/2023    EGFRRESULT 42 (L) 08/26/2022    EGFR 45.7 (L) 02/02/2023    BCR 16.8 02/02/2023    K 4.0 02/02/2023    CO2 22.9 02/02/2023    CALCIUM 8.5 (L) 02/02/2023    PROTENTOTREF 6.9 03/02/2021    ALBUMIN 2.8 (L) 02/02/2023    LABIL2 1.6 03/02/2021    AST 23 01/30/2023    ALT 19 01/30/2023                  "

## 2023-02-09 NOTE — HOME HEALTH
REASON FOR REFERRAL: Speech Therapy referral post hospitalization for pneumonia.    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  PRIOR VFSS at [facility] 2/1/23. Moderate Oropharyngeal Dysphagia  PRIOR LEVEL OF FUNCTION: Patient living in his single family home with his wife. Patient with left side weakness from CVA, prior.     FOCUS OF THERAPY: Dysphagia therapy and education to increase safe intake of foods and liquids and decrease risk of aspiration/aspiration pneumonia.        PLAN FOR NEXT VISIT    MEDICAL NECESSITY FOR ONGOING SKILLED THERAPY: Dysphagia therapy to assess/evaluate least restrictive oral intake, minimize aspiration/risk of and restore oropharyngeal function in order to enhance patient's quality of life by improving ability to safely consume least restrictive diet.  Skilled Speech Therapy interventions are necessary due to decline in swallowing function and increased risk of aspiration.  SPECIFIC INTERVENTIONS AND GOALS TO ADDRESS ON NEXT VISIT:  Instructions in HEP to increase swallowing function and strength  Education: Food preparation and liquid consistency (nectar)  FREQUENCY AND DURATION: 2w4  ANY OTHER FOLLOW UP NEEDED: VFSS prior to advancement in diet  REASSESSMENT DUE DATE: 3/11/23

## 2023-02-10 ENCOUNTER — ANTICOAGULATION VISIT (OUTPATIENT)
Dept: PHARMACY | Facility: HOSPITAL | Age: 88
End: 2023-02-10
Payer: MEDICARE

## 2023-02-10 ENCOUNTER — HOME CARE VISIT (OUTPATIENT)
Dept: HOME HEALTH SERVICES | Facility: HOME HEALTHCARE | Age: 88
End: 2023-02-10
Payer: COMMERCIAL

## 2023-02-10 VITALS
OXYGEN SATURATION: 100 % | RESPIRATION RATE: 18 BRPM | TEMPERATURE: 97.2 F | DIASTOLIC BLOOD PRESSURE: 68 MMHG | HEART RATE: 80 BPM | SYSTOLIC BLOOD PRESSURE: 130 MMHG

## 2023-02-10 DIAGNOSIS — I63.411 CEREBROVASCULAR ACCIDENT (CVA) DUE TO EMBOLISM OF RIGHT MIDDLE CEREBRAL ARTERY: ICD-10-CM

## 2023-02-10 DIAGNOSIS — Z95.4 HISTORY OF AORTIC VALVE REPLACEMENT WITH METALLIC VALVE: Primary | ICD-10-CM

## 2023-02-10 LAB — INR PPP: 4.2

## 2023-02-10 PROCEDURE — G0300 HHS/HOSPICE OF LPN EA 15 MIN: HCPCS

## 2023-02-10 PROCEDURE — G0151 HHCP-SERV OF PT,EA 15 MIN: HCPCS

## 2023-02-10 NOTE — PROGRESS NOTES
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]             Anticoagulation Episode Summary     INR check location:      Preferred lab:      Send INR reminders to:   [facility]  POOL    Comments:  New INR goal 3 - 3.5 (see 2020 hospitalization for CVA)      Anticoagulation Care Providers     Provider Role Specialty Phone number    Griffin Fried MD Referring Cardiology [phone]          Clinic Interview:  Patient Findings     Negatives:  Signs/symptoms of thrombosis, Signs/symptoms of bleeding,   Laboratory test error suspected, Change in health, Change in alcohol use,   Change in activity, Upcoming invasive procedure, Emergency department   visit, Upcoming dental procedure, Missed doses, Extra doses, Change in   medications, Change in diet/appetite, Hospital admission, Bruising, Other   complaints      Clinical Outcomes     Negatives:  Major bleeding event, Thromboembolic event,   Anticoagulation-related hospital admission, Anticoagulation-related ED   visit, Anticoagulation-related fatality        INR History:  Anticoagulation Monitoring 2023 2023 2/10/2023   INR 2.30 4.00 4.20   INR Date 2023 2023 2/10/2023   INR Goal 3.0-3.5 3.0-3.5 3.0-3.5   Trend Same Same Same   Last Week Total 15 mg 27.5 mg 37.5 mg   Next Week Total 55 mg 47.5 mg 42.5 mg   Sun - - 7.5 mg   Mon 10 mg () - 7.5 mg   Tue 10 mg () -  7.5 mg   Wed - 5 mg (2/8) 7.5 mg   Thu - 5 mg 5 mg   Fri - - Hold (2/10)   Sat - - 7.5 mg   Visit Report - - -   Some recent data might be hidden       Plan:  1. INR is Supratherapeutic today- see above in Anticoagulation Summary.   Will instruct LAURA Sequeira to Change their warfarin regimen- see above in Anticoagulation Summary.  2. Follow up in 4 days  3. Spoke to patient's wife, Gladys. They have been instructed to call if any changes in medications, doses, concerns, etc. Patient expresses understanding and has no further questions at this time.    Carmen El, PharmD

## 2023-02-10 NOTE — HOME HEALTH
"Subjective: Per spouse: \"He's doing better but he is still weaker and tires out compared to before he went to hospital. I took him out ot MD appt yesterday and he had to use wheelchair to get him part way to car in driveway whereas before he could walk all the way from house to the car.\"    Falls reported: none    Medication changes: none    Plan for next visit: Continue gait training with rolling walker to build strength/endurance to improve household mobility with cues to lock his left knee brace, HEP reinstruction with upgrades as tolerated in sitting/standing positions, and patient spouse education as needed"

## 2023-02-13 ENCOUNTER — HOME CARE VISIT (OUTPATIENT)
Dept: HOME HEALTH SERVICES | Facility: HOME HEALTHCARE | Age: 88
End: 2023-02-13
Payer: COMMERCIAL

## 2023-02-13 ENCOUNTER — LAB (OUTPATIENT)
Dept: LAB | Facility: HOSPITAL | Age: 88
End: 2023-02-13
Payer: COMMERCIAL

## 2023-02-13 VITALS
HEART RATE: 71 BPM | SYSTOLIC BLOOD PRESSURE: 122 MMHG | DIASTOLIC BLOOD PRESSURE: 68 MMHG | OXYGEN SATURATION: 98 % | TEMPERATURE: 97.6 F

## 2023-02-13 VITALS
HEART RATE: 56 BPM | DIASTOLIC BLOOD PRESSURE: 60 MMHG | SYSTOLIC BLOOD PRESSURE: 126 MMHG | TEMPERATURE: 97.9 F | OXYGEN SATURATION: 97 % | RESPIRATION RATE: 18 BRPM

## 2023-02-13 DIAGNOSIS — I48.0 PAROXYSMAL ATRIAL FIBRILLATION: ICD-10-CM

## 2023-02-13 DIAGNOSIS — E87.5 HYPERKALEMIA: Primary | ICD-10-CM

## 2023-02-13 LAB
ANION GAP SERPL CALCULATED.3IONS-SCNC: 5.8 MMOL/L (ref 5–15)
BUN SERPL-MCNC: 26 MG/DL (ref 8–23)
BUN/CREAT SERPL: 17.8 (ref 7–25)
CALCIUM SPEC-SCNC: 9.6 MG/DL (ref 8.6–10.5)
CHLORIDE SERPL-SCNC: 109 MMOL/L (ref 98–107)
CO2 SERPL-SCNC: 26.2 MMOL/L (ref 22–29)
CREAT SERPL-MCNC: 1.46 MG/DL (ref 0.76–1.27)
EGFRCR SERPLBLD CKD-EPI 2021: 46.8 ML/MIN/1.73
GLUCOSE SERPL-MCNC: 86 MG/DL (ref 65–99)
HOLD SPECIMEN: NORMAL
POTASSIUM SERPL-SCNC: 5.7 MMOL/L (ref 3.5–5.2)
SODIUM SERPL-SCNC: 141 MMOL/L (ref 136–145)
WHOLE BLOOD HOLD SPECIMEN: NORMAL

## 2023-02-13 PROCEDURE — 36415 COLL VENOUS BLD VENIPUNCTURE: CPT

## 2023-02-13 PROCEDURE — 80048 BASIC METABOLIC PNL TOTAL CA: CPT

## 2023-02-13 PROCEDURE — G0151 HHCP-SERV OF PT,EA 15 MIN: HCPCS

## 2023-02-13 NOTE — PROGRESS NOTES
He needs to stop eating watermelon and potassium He needs to come back in for a lab check in 1 week. Order is in. He should research a list of high potassium foods online and avoid frequent amounts of those foods. If his potassium is not down by next visit, we will have to add medication to lower potassium. Implemented All Fall Risk Interventions:  [place] to call system. Call bell, personal items and telephone within reach. Instruct patient to call for assistance. Room bathroom lighting operational. Non-slip footwear when patient is off stretcher. Physically safe environment: no spills, clutter or unnecessary equipment. Stretcher in lowest position, wheels locked, appropriate side rails in place. Provide visual cue, wrist band, yellow gown, etc. Monitor gait and stability. Monitor for mental status changes and reorient to person, place, and time. Review medications for side effects contributing to fall risk. Reinforce activity limits and safety measures with patient and family.

## 2023-02-13 NOTE — HOME HEALTH
"Patient and wife greet therapist.  Patient was seen by rehab MD today and he feels he is doing well and he will return in June of this year and at that time they may discuss resuming outpatient PT.  Wife feels patient is nearing his \"Normal\" prior to hospitalization but still fatigues and is unable to walk distances he was able to prior to pneumonia diagnosis.  Patient still complains of left leg weakness.      Lengthy discussion with wife and patient regarding high potassium levels.  Discussed diet and hydration with patient and wife but deferring details to MD discretion.    Plan for Next Visit:  -Continue to expand on standing and ambulation endurance, posture and safety  -Address strength with standing exercises"

## 2023-02-13 NOTE — HOME HEALTH
Patients wife assists with all needs including medications. She was present at time of visit and we went over all medications. Patient has appointment with physician on Friday and Monday. INR drawn by wife with home machine 4.2 and reported to physician today

## 2023-02-14 ENCOUNTER — ANTICOAGULATION VISIT (OUTPATIENT)
Dept: PHARMACY | Facility: HOSPITAL | Age: 88
End: 2023-02-14
Payer: MEDICARE

## 2023-02-14 ENCOUNTER — HOME CARE VISIT (OUTPATIENT)
Dept: HOME HEALTH SERVICES | Facility: HOME HEALTHCARE | Age: 88
End: 2023-02-14
Payer: COMMERCIAL

## 2023-02-14 VITALS
TEMPERATURE: 97.5 F | DIASTOLIC BLOOD PRESSURE: 72 MMHG | SYSTOLIC BLOOD PRESSURE: 132 MMHG | HEART RATE: 54 BPM | RESPIRATION RATE: 18 BRPM | OXYGEN SATURATION: 100 %

## 2023-02-14 DIAGNOSIS — I63.411 CEREBROVASCULAR ACCIDENT (CVA) DUE TO EMBOLISM OF RIGHT MIDDLE CEREBRAL ARTERY: ICD-10-CM

## 2023-02-14 DIAGNOSIS — Z95.4 HISTORY OF AORTIC VALVE REPLACEMENT WITH METALLIC VALVE: Primary | ICD-10-CM

## 2023-02-14 LAB — INR PPP: 2.2

## 2023-02-14 PROCEDURE — G0153 HHCP-SVS OF S/L PATH,EA 15MN: HCPCS

## 2023-02-14 NOTE — PROGRESS NOTES
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]             Anticoagulation Episode Summary     INR check location:      Preferred lab:      Send INR reminders to:  [facility]  POOL    Comments:  New INR goal 3 - 3.5 (see 2020 hospitalization for CVA)      Anticoagulation Care Providers     Provider Role Specialty Phone number    Griffin Fried MD Referring Cardiology [phone]          Clinic Interview:  Patient Findings     Positives:  Other complaints    Negatives:  Signs/symptoms of thrombosis, Signs/symptoms of bleeding,   Laboratory test error suspected, Change in health, Change in alcohol use,   Change in activity, Upcoming invasive procedure, Emergency department   visit, Upcoming dental procedure, Missed doses, Extra doses, Change in   medications, Change in diet/appetite, Hospital admission, Bruising    Comments:  Reports she held warfarin on 2/10/23 as instructed but she   misunderstood our instructions and has since taken 5 mg daily.       Clinical Outcomes     Negatives:  Major bleeding event, Thromboembolic event,   Anticoagulation-related hospital admission, Anticoagulation-related ED   visit, Anticoagulation-related fatality    Comments:  Reports she held warfarin on 2/10/23 as instructed but she   misunderstood our instructions and has since taken 5 mg daily.         INR History:  Anticoagulation  Monitoring 2/8/2023 2/10/2023 2/14/2023   INR 4.00 4.20 2.20   INR Date 2/8/2023 2/10/2023 2/14/2023   INR Goal 3.0-3.5 3.0-3.5 3.0-3.5   Trend Same Same Same   Last Week Total 27.5 mg 37.5 mg 35 mg   Next Week Total 47.5 mg 42.5 mg 55 mg   Sun - 7.5 mg -   Mon - 7.5 mg -   Tue - 7.5 mg 10 mg (2/14)   Wed 5 mg (2/8) - 10 mg (2/15)   Thu 5 mg - 5 mg   Fri - Hold (2/10) -   Sat - 7.5 mg -   Visit Report - - -   Some recent data might be hidden       Plan:  1. INR is Subtherapeutic today- see above in Anticoagulation Summary.   Will instruct LAURA Sequeira to Change their warfarin regimen- see above in Anticoagulation Summary.  2. Follow up in 3 days.  3. They have been instructed to call if any changes in medications, doses, concerns, etc. Patient expresses understanding and has no further questions at this time.    Vasquez Niño, PharmD

## 2023-02-15 ENCOUNTER — HOME CARE VISIT (OUTPATIENT)
Dept: HOME HEALTH SERVICES | Facility: HOME HEALTHCARE | Age: 88
End: 2023-02-15
Payer: COMMERCIAL

## 2023-02-15 VITALS
HEART RATE: 82 BPM | SYSTOLIC BLOOD PRESSURE: 130 MMHG | OXYGEN SATURATION: 95 % | TEMPERATURE: 97.9 F | DIASTOLIC BLOOD PRESSURE: 74 MMHG

## 2023-02-15 PROCEDURE — G0151 HHCP-SERV OF PT,EA 15 MIN: HCPCS

## 2023-02-15 NOTE — HOME HEALTH
Patient was awaiting therapist arrival and reported doing well.     PLAN FOR NEXT VISIT   MEDICAL NECESSITY FOR ONGOING SKILLED THERAPY: Dysphagia therapy to assess/evaluate least restrictive oral intake, minimize aspiration/risk of and restore oropharyngeal function in order to enhance patient's quality of life by improving ability to safely consume least restrictive diet.   Skilled Speech Therapy interventions are necessary due to decline in swallowing function and increased risk of aspiration.   SPECIFIC INTERVENTIONS AND GOALS TO ADDRESS ON NEXT VISIT:   Instructions in HEP to increase swallowing function and strength   Education: Food preparation and liquid consistency (nectar)   FREQUENCY AND DURATION: 2w4   ANY OTHER FOLLOW UP NEEDED: VFSS prior to advancement in diet   REASSESSMENT DUE DATE: 3/11/23

## 2023-02-15 NOTE — HOME HEALTH
Patient opened door for PT while in wheelchair.  He has just finished lunch and is doing well.  No new complaints.     PT donned hinge knee brace for patient for session.  Removed prior to end of session.    Plan for Next Visit:  -Continue to address functional quadricep strength both in sitting and standing  -Address posture with ambulation  -Work to increase endurance/tolerance to activity and ambulation

## 2023-02-16 ENCOUNTER — HOME CARE VISIT (OUTPATIENT)
Dept: HOME HEALTH SERVICES | Facility: HOME HEALTHCARE | Age: 88
End: 2023-02-16
Payer: COMMERCIAL

## 2023-02-16 VITALS
OXYGEN SATURATION: 98 % | TEMPERATURE: 97.8 F | DIASTOLIC BLOOD PRESSURE: 70 MMHG | SYSTOLIC BLOOD PRESSURE: 122 MMHG | HEART RATE: 72 BPM

## 2023-02-16 PROCEDURE — G0153 HHCP-SVS OF S/L PATH,EA 15MN: HCPCS

## 2023-02-17 ENCOUNTER — ANTICOAGULATION VISIT (OUTPATIENT)
Dept: PHARMACY | Facility: HOSPITAL | Age: 88
End: 2023-02-17
Payer: MEDICARE

## 2023-02-17 ENCOUNTER — HOME CARE VISIT (OUTPATIENT)
Dept: HOME HEALTH SERVICES | Facility: HOME HEALTHCARE | Age: 88
End: 2023-02-17
Payer: MEDICARE

## 2023-02-17 DIAGNOSIS — Z95.4 HISTORY OF AORTIC VALVE REPLACEMENT WITH METALLIC VALVE: Primary | ICD-10-CM

## 2023-02-17 DIAGNOSIS — I63.411 CEREBROVASCULAR ACCIDENT (CVA) DUE TO EMBOLISM OF RIGHT MIDDLE CEREBRAL ARTERY: ICD-10-CM

## 2023-02-17 LAB — INR PPP: 4.8

## 2023-02-17 NOTE — PROGRESS NOTES
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]             Anticoagulation Episode Summary     INR check location:      Preferred lab:      Send INR reminders to:  [facility]  POOL    Comments:  New INR goal 3 - 3.5 (see 2020 hospitalization for CVA)      Anticoagulation Care Providers     Provider Role Specialty Phone number    Griffin Fried MD Referring Cardiology [phone]          Clinic Interview:  Patient Findings     Negatives:  Signs/symptoms of thrombosis, Signs/symptoms of bleeding,   Laboratory test error suspected, Change in health, Change in alcohol use,   Change in activity, Upcoming invasive procedure, Emergency department   visit, Upcoming dental procedure, Missed doses, Extra doses, Change in   medications, Change in diet/appetite, Hospital admission, Bruising, Other   complaints      Clinical Outcomes     Negatives:  Major bleeding event, Thromboembolic event,   Anticoagulation-related hospital admission, Anticoagulation-related ED   visit, Anticoagulation-related fatality        INR History:  Anticoagulation Monitoring 2/10/2023 2023 2023   INR 4.20 2.20 4.80   INR Date 2/10/2023 2023 2023   INR Goal 3.0-3.5 3.0-3.5 3.0-3.5   Trend Same Same Same   Last Week Total 37.5 mg 35 mg 40 mg   Next Week Total 42.5 mg 55 mg 37.5 mg   Sun 7.5 mg - 5 mg ()   Mon 7.5 mg - -   Tue 7.5  mg 10 mg (2/14) -   Wed - 10 mg (2/15) -   Thu - 5 mg -   Fri Hold (2/10) - Hold (2/17)   Sat 7.5 mg - 5 mg (2/18)   Visit Report - - -   Some recent data might be hidden       Plan:  1. INR is Supratherapeutic today- see above in Anticoagulation Summary.   Will instruct LAURA Sequeira to Change their warfarin regimen- see above in Anticoagulation Summary.  2. Follow up in 3 days   3. Spoke to patient's wife, Gladys. They have been instructed to call if any changes in medications, doses, concerns, etc. Patient expresses understanding and has no further questions at this time.    Carmen El, PharmD

## 2023-02-20 ENCOUNTER — HOME CARE VISIT (OUTPATIENT)
Dept: HOME HEALTH SERVICES | Facility: HOME HEALTHCARE | Age: 88
End: 2023-02-20
Payer: COMMERCIAL

## 2023-02-20 ENCOUNTER — ANTICOAGULATION VISIT (OUTPATIENT)
Dept: PHARMACY | Facility: HOSPITAL | Age: 88
End: 2023-02-20
Payer: MEDICARE

## 2023-02-20 VITALS
RESPIRATION RATE: 18 BRPM | HEART RATE: 78 BPM | TEMPERATURE: 97.5 F | OXYGEN SATURATION: 99 % | SYSTOLIC BLOOD PRESSURE: 130 MMHG | DIASTOLIC BLOOD PRESSURE: 72 MMHG

## 2023-02-20 VITALS
TEMPERATURE: 96.4 F | HEART RATE: 59 BPM | DIASTOLIC BLOOD PRESSURE: 72 MMHG | SYSTOLIC BLOOD PRESSURE: 140 MMHG | OXYGEN SATURATION: 98 % | RESPIRATION RATE: 18 BRPM

## 2023-02-20 DIAGNOSIS — I63.411 CEREBROVASCULAR ACCIDENT (CVA) DUE TO EMBOLISM OF RIGHT MIDDLE CEREBRAL ARTERY: ICD-10-CM

## 2023-02-20 DIAGNOSIS — Z95.4 HISTORY OF AORTIC VALVE REPLACEMENT WITH METALLIC VALVE: Primary | ICD-10-CM

## 2023-02-20 LAB — INR PPP: 3

## 2023-02-20 PROCEDURE — G0299 HHS/HOSPICE OF RN EA 15 MIN: HCPCS

## 2023-02-20 PROCEDURE — G0151 HHCP-SERV OF PT,EA 15 MIN: HCPCS

## 2023-02-20 RX ORDER — FAMOTIDINE 20 MG/1
TABLET, FILM COATED ORAL
Qty: 90 TABLET | Refills: 1 | Status: SHIPPED | OUTPATIENT
Start: 2023-02-20

## 2023-02-20 NOTE — PROGRESS NOTES
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]             Anticoagulation Episode Summary     INR check location:      Preferred lab:      Send INR reminders to:  [facility]  POOL    Comments:  New INR goal 3 - 3.5 (see 1/2020 hospitalization for CVA)      Anticoagulation Care Providers     Provider Role Specialty Phone number    Griffin Fried MD Referring Cardiology [phone]          Clinic Interview:  Patient Findings     Negatives:  Signs/symptoms of thrombosis, Signs/symptoms of bleeding,   Laboratory test error suspected, Change in health, Change in alcohol use,   Change in activity, Upcoming invasive procedure, Emergency department   visit, Upcoming dental procedure, Missed doses, Extra doses, Change in   medications, Change in diet/appetite, Hospital admission, Bruising, Other   complaints      Clinical Outcomes     Negatives:  Major bleeding event, Thromboembolic event,   Anticoagulation-related hospital admission, Anticoagulation-related ED   visit, Anticoagulation-related fatality        INR History:  Anticoagulation Monitoring 2/14/2023 2/17/2023 2/20/2023   INR 2.20 4.80 3.00   INR Date 2/14/2023 2/17/2023 2/20/2023   INR Goal 3.0-3.5 3.0-3.5 3.0-3.5   Trend Same Same Same   Last Week Total 35 mg 40 mg 40 mg   Next Week Total 55 mg 37.5 mg 47.5 mg   Sun - 5 mg (2/19) -   Mon - - 7.5 mg   Tue 10 mg  (2/14) - 5 mg (2/21)   Wed 10 mg (2/15) - -   Thu 5 mg - -   Fri - Hold (2/17) -   Sat - 5 mg (2/18) -   Visit Report - - -   Some recent data might be hidden       Plan:  1. INR is Therapeutic today- see above in Anticoagulation Summary.   Will instruct LAURA Sequeira to Change their warfarin regimen- see above in Anticoagulation Summary.  2. Follow up in 2 days   3. Spoke to patient's wife, Gladys. They have been instructed to call if any changes in medications, doses, concerns, etc. Patient expresses understanding and has no further questions at this time.    Carmen El, PharmD

## 2023-02-20 NOTE — HOME HEALTH
"Subjective: \"I'm gaining strength and confidence moving around.\"    Falls reported: none    Medication changes: none    Plan for next visit: Continue gait training with rolling walker and left knee brace, further HEP training fir strengthening/left leg stabiliztion, and patient spouse education as needed"

## 2023-02-21 ENCOUNTER — LAB (OUTPATIENT)
Dept: LAB | Facility: HOSPITAL | Age: 88
End: 2023-02-21
Payer: COMMERCIAL

## 2023-02-21 ENCOUNTER — HOME CARE VISIT (OUTPATIENT)
Dept: HOME HEALTH SERVICES | Facility: HOME HEALTHCARE | Age: 88
End: 2023-02-21
Payer: COMMERCIAL

## 2023-02-21 VITALS
OXYGEN SATURATION: 94 % | TEMPERATURE: 97.8 F | DIASTOLIC BLOOD PRESSURE: 64 MMHG | HEART RATE: 63 BPM | SYSTOLIC BLOOD PRESSURE: 138 MMHG

## 2023-02-21 PROCEDURE — G0153 HHCP-SVS OF S/L PATH,EA 15MN: HCPCS

## 2023-02-22 ENCOUNTER — HOME CARE VISIT (OUTPATIENT)
Dept: HOME HEALTH SERVICES | Facility: HOME HEALTHCARE | Age: 88
End: 2023-02-22
Payer: COMMERCIAL

## 2023-02-22 ENCOUNTER — ANTICOAGULATION VISIT (OUTPATIENT)
Dept: PHARMACY | Facility: HOSPITAL | Age: 88
End: 2023-02-22
Payer: MEDICARE

## 2023-02-22 VITALS
SYSTOLIC BLOOD PRESSURE: 126 MMHG | HEART RATE: 63 BPM | OXYGEN SATURATION: 94 % | TEMPERATURE: 97.3 F | RESPIRATION RATE: 18 BRPM | DIASTOLIC BLOOD PRESSURE: 68 MMHG

## 2023-02-22 DIAGNOSIS — I63.411 CEREBROVASCULAR ACCIDENT (CVA) DUE TO EMBOLISM OF RIGHT MIDDLE CEREBRAL ARTERY: ICD-10-CM

## 2023-02-22 DIAGNOSIS — Z95.4 HISTORY OF AORTIC VALVE REPLACEMENT WITH METALLIC VALVE: Primary | ICD-10-CM

## 2023-02-22 LAB — INR PPP: 4.1

## 2023-02-22 PROCEDURE — G0151 HHCP-SERV OF PT,EA 15 MIN: HCPCS

## 2023-02-22 NOTE — HOME HEALTH
"Subjective: \"I'm gaining confidence standing on my left leg even without the brace.\"    Falls reported: none    Medication changes: none    Plan for next visit: Discharge assessment and instructions, final gait training with rolling walker, final HEP training, and patient spouse education as needed"

## 2023-02-22 NOTE — HOME HEALTH
Patient was awaiting therapist arrival and reported doing well. Patient's wife reported that he had a doctor's appointment today.     PLAN FOR NEXT VISIT   MEDICAL NECESSITY FOR ONGOING SKILLED THERAPY: Dysphagia therapy to assess/evaluate least restrictive oral intake, minimize aspiration/risk of and restore oropharyngeal function in order to enhance patient's quality of life by improving ability to safely consume least restrictive diet.   Skilled Speech Therapy interventions are necessary due to decline in swallowing function and increased risk of aspiration.   SPECIFIC INTERVENTIONS AND GOALS TO ADDRESS ON NEXT VISIT:   Instructions in HEP to increase swallowing function and strength   Education: Food preparation and liquid consistency (nectar)   FREQUENCY AND DURATION: 2w4   ANY OTHER FOLLOW UP NEEDED: VFSS prior to advancement in diet   REASSESSMENT DUE DATE: 3/11/23

## 2023-02-22 NOTE — PROGRESS NOTES
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]             Anticoagulation Episode Summary     INR check location:      Preferred lab:      Send INR reminders to:  [facility]  POOL    Comments:  New INR goal 3 - 3.5 (see 2020 hospitalization for CVA)      Anticoagulation Care Providers     Provider Role Specialty Phone number    Griffin Fried MD Referring Cardiology [phone]          Clinic Interview:  Patient Findings     Negatives:  Signs/symptoms of thrombosis, Signs/symptoms of bleeding,   Laboratory test error suspected, Change in health, Change in alcohol use,   Change in activity, Upcoming invasive procedure, Emergency department   visit, Upcoming dental procedure, Missed doses, Extra doses, Change in   medications, Change in diet/appetite, Hospital admission, Bruising, Other   complaints      Clinical Outcomes     Negatives:  Major bleeding event, Thromboembolic event,   Anticoagulation-related hospital admission, Anticoagulation-related ED   visit, Anticoagulation-related fatality        INR History:  Anticoagulation Monitoring 2023   INR 4.80 3.00 4.10   INR Date 2023   INR Goal 3.0-3.5 3.0-3.5 3.0-3.5   Trend Same Same Same   Last Week Total 40 mg 40 mg 37.5 mg   Next Week Total 37.5 mg 47.5 mg 45 mg   Sun 5 mg () - -   Mon - 7.5 mg -   Tue - 5 mg  (2/21) -   Wed - - 5 mg (2/22)   Thu - - 5 mg   Fri Hold (2/17) - -   Sat 5 mg (2/18) - -   Visit Report - - -   Some recent data might be hidden       Plan:  1. INR is Supratherapeutic today- see above in Anticoagulation Summary.   Will instruct LAURA Sequeira to Change their warfarin regimen- see above in Anticoagulation Summary.  Take 5 mg today and tomorrow, recheck Friday.    2. Follow up in 2 days.  3. They have been instructed to call if any changes in medications, doses, concerns, etc. Patient expresses understanding and has no further questions at this time.    Carmen El, PharmD

## 2023-02-23 ENCOUNTER — HOME CARE VISIT (OUTPATIENT)
Dept: HOME HEALTH SERVICES | Facility: HOME HEALTHCARE | Age: 88
End: 2023-02-23
Payer: COMMERCIAL

## 2023-02-23 VITALS
RESPIRATION RATE: 18 BRPM | SYSTOLIC BLOOD PRESSURE: 124 MMHG | TEMPERATURE: 97.8 F | OXYGEN SATURATION: 95 % | HEART RATE: 68 BPM | DIASTOLIC BLOOD PRESSURE: 64 MMHG

## 2023-02-23 PROCEDURE — G0153 HHCP-SVS OF S/L PATH,EA 15MN: HCPCS

## 2023-02-23 NOTE — HOME HEALTH
Patient was awaiting therapist arrival and reported doing well.  Patient reinstructed in HEP. Lingual resistance exercises added to plan. Discussed having a VFSS in the next 2 weeks.     PLAN FOR NEXT VISIT   MEDICAL NECESSITY FOR ONGOING SKILLED THERAPY: Dysphagia therapy to assess/evaluate least restrictive oral intake, minimize aspiration/risk of and restore oropharyngeal function in order to enhance patient's quality of life by improving ability to safely consume least restrictive diet.   Skilled Speech Therapy interventions are necessary due to decline in swallowing function and increased risk of aspiration.   SPECIFIC INTERVENTIONS AND GOALS TO ADDRESS ON NEXT VISIT:   Renstructions in HEP to increase swallowing function and strength   Education: Food preparation and liquid consistency (nectar)   FREQUENCY AND DURATION: 2w4   ANY OTHER FOLLOW UP NEEDED: VFSS to assess swallow prior to advancement in diet   REASSESSMENT DUE DATE: 3/11/23

## 2023-02-24 ENCOUNTER — ANTICOAGULATION VISIT (OUTPATIENT)
Dept: PHARMACY | Facility: HOSPITAL | Age: 88
End: 2023-02-24
Payer: MEDICARE

## 2023-02-24 DIAGNOSIS — Z95.4 HISTORY OF AORTIC VALVE REPLACEMENT WITH METALLIC VALVE: Primary | ICD-10-CM

## 2023-02-24 DIAGNOSIS — I63.411 CEREBROVASCULAR ACCIDENT (CVA) DUE TO EMBOLISM OF RIGHT MIDDLE CEREBRAL ARTERY: ICD-10-CM

## 2023-02-24 LAB — INR PPP: 3.8

## 2023-02-24 NOTE — PROGRESS NOTES
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]             Anticoagulation Episode Summary     INR check location:      Preferred lab:      Send INR reminders to:   [facility]  [facility]    Comments:  New INR goal 3 - 3.5 (see 1/2020 hospitalization for CVA)      Anticoagulation Care Providers     Provider Role Specialty Phone number    Griffin Fried MD Referring Cardiology [phone]          Clinic Interview:  Patient Findings     Positives:  Other complaints    Negatives:  Signs/symptoms of thrombosis, Signs/symptoms of bleeding,   Laboratory test error suspected, Change in health, Change in alcohol use,   Change in activity, Upcoming invasive procedure, Emergency department   visit, Upcoming dental procedure, Missed doses, Extra doses, Change in   medications, Change in diet/appetite, Hospital admission, Bruising    Comments:  Gladys has strips coming so she will ck Monday and if she   doesn't get the strips will have PT check.  Still planning on coming for   home test training on Friday 3/3.         Clinical Outcomes     Negatives:  Major bleeding event, Thromboembolic event,   Anticoagulation-related hospital admission, Anticoagulation-related ED   visit, Anticoagulation-related fatality    Comments:  Gladys has strips coming so she will ck Monday and if she   doesn't get the strips will have PT check.  Still planning on coming for   home  test training on Friday 3/3.           INR History:  Anticoagulation Monitoring 2/20/2023 2/22/2023 2/24/2023   INR 3.00 4.10 3.80   INR Date 2/20/2023 2/22/2023 2/24/2023   INR Goal 3.0-3.5 3.0-3.5 3.0-3.5   Trend Same Same Down   Last Week Total 40 mg 37.5 mg 32.5 mg   Next Week Total 47.5 mg 45 mg 35 mg   Sun - - 5 mg   Mon 7.5 mg - -   Tue 5 mg (2/21) - -   Wed - 5 mg (2/22) -   Thu - 5 mg -   Fri - - 5 mg   Sat - - 5 mg (2/25)   Visit Report - - -   Some recent data might be hidden       Plan:  1. INR is Supratherapeutic today- see above in Anticoagulation Summary.   Will instruct LAURA Sequeira to Change their warfarin regimen- see above in Anticoagulation Summary.  2. Follow up in 3 days  3. They have been instructed to call if any changes in medications, doses, concerns, etc. Patient expresses understanding and has no further questions at this time.    Carmen El, PharmD

## 2023-02-27 ENCOUNTER — ANTICOAGULATION VISIT (OUTPATIENT)
Dept: PHARMACY | Facility: HOSPITAL | Age: 88
End: 2023-02-27
Payer: MEDICARE

## 2023-02-27 ENCOUNTER — HOME CARE VISIT (OUTPATIENT)
Dept: HOME HEALTH SERVICES | Facility: HOME HEALTHCARE | Age: 88
End: 2023-02-27
Payer: COMMERCIAL

## 2023-02-27 VITALS
DIASTOLIC BLOOD PRESSURE: 68 MMHG | OXYGEN SATURATION: 94 % | TEMPERATURE: 96.8 F | SYSTOLIC BLOOD PRESSURE: 128 MMHG | HEART RATE: 68 BPM | RESPIRATION RATE: 18 BRPM

## 2023-02-27 DIAGNOSIS — I63.411 CEREBROVASCULAR ACCIDENT (CVA) DUE TO EMBOLISM OF RIGHT MIDDLE CEREBRAL ARTERY: ICD-10-CM

## 2023-02-27 DIAGNOSIS — Z95.4 HISTORY OF AORTIC VALVE REPLACEMENT WITH METALLIC VALVE: Primary | ICD-10-CM

## 2023-02-27 LAB — INR PPP: 3.5

## 2023-02-27 PROCEDURE — G0151 HHCP-SERV OF PT,EA 15 MIN: HCPCS

## 2023-02-27 RX ORDER — WARFARIN SODIUM 5 MG/1
TABLET ORAL
Qty: 90 TABLET | Refills: 1 | Status: SHIPPED | OUTPATIENT
Start: 2023-02-27

## 2023-02-27 NOTE — PROGRESS NOTES
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]             Anticoagulation Episode Summary     INR check location:      Preferred lab:      Send INR reminders to:  [facility]  POOL    Comments:  New INR goal 3 - 3.5 (see 1/2020 hospitalization for CVA)      Anticoagulation Care Providers     Provider Role Specialty Phone number    Griffin Fried MD Referring Cardiology [phone]          Clinic Interview:  Patient Findings     Negatives:  Signs/symptoms of thrombosis, Signs/symptoms of bleeding,   Laboratory test error suspected, Change in health, Change in alcohol use,   Change in activity, Upcoming invasive procedure, Emergency department   visit, Upcoming dental procedure, Missed doses, Extra doses, Change in   medications, Change in diet/appetite, Hospital admission, Bruising, Other   complaints    Comments:  Received more INR test strips from [employer] over weekend.      Clinical Outcomes     Negatives:  Major bleeding event, Thromboembolic event,   Anticoagulation-related hospital admission, Anticoagulation-related ED   visit, Anticoagulation-related fatality    Comments:  Received more INR test strips from [employer] over weekend.        INR History:  Anticoagulation Monitoring 2/22/2023 2/24/2023 2/27/2023   INR 4.10 3.80 3.50   INR Date 2/22/2023 2/24/2023 2/27/2023   INR Goal 3.0-3.5  3.0-3.5 3.0-3.5   Trend Same Down Same   Last Week Total 37.5 mg 32.5 mg 37.5 mg   Next Week Total 45 mg 35 mg 35 mg   Sun - 5 mg -   Mon - - 5 mg   Tue - - 5 mg   Wed 5 mg (2/22) - -   Thu 5 mg - -   Fri - 5 mg -   Sat - 5 mg (2/25) -   Visit Report - - -   Some recent data might be hidden       Plan:  1. INR is Therapeutic today- see above in Anticoagulation Summary.   Will instruct LAURA Sequeira to Continue their warfarin regimen of 5 mg daily - see above in Anticoagulation Summary.  2. Follow up in 2 days to ensure INR is stable. Home testing training in clinic scheduled for 3/3/23.   3. They have been instructed to call if any changes in medications, doses, concerns, etc. Patient expresses understanding and has no further questions at this time.    Vasquez Niño, PharmD

## 2023-02-27 NOTE — HOME HEALTH
"Subjective: Per spouse via phone: \"His test strips came in Sat to check his protime so he doesn't you to check his PT/INR. I called it into the MD this morning.\"    Falls reported: none    Medication changes: none    PT/INR check ordered last week was canceled as noted above as spouse got his test strips so did not need PT to do it today"

## 2023-02-28 ENCOUNTER — HOME CARE VISIT (OUTPATIENT)
Dept: HOME HEALTH SERVICES | Facility: HOME HEALTHCARE | Age: 88
End: 2023-02-28
Payer: COMMERCIAL

## 2023-02-28 VITALS
TEMPERATURE: 97.7 F | RESPIRATION RATE: 18 BRPM | HEART RATE: 85 BPM | DIASTOLIC BLOOD PRESSURE: 60 MMHG | OXYGEN SATURATION: 100 % | SYSTOLIC BLOOD PRESSURE: 118 MMHG

## 2023-02-28 DIAGNOSIS — J18.9 PNEUMONIA OF RIGHT LOWER LOBE DUE TO INFECTIOUS ORGANISM: Primary | ICD-10-CM

## 2023-02-28 PROCEDURE — G0153 HHCP-SVS OF S/L PATH,EA 15MN: HCPCS

## 2023-03-01 ENCOUNTER — ANTICOAGULATION VISIT (OUTPATIENT)
Dept: PHARMACY | Facility: HOSPITAL | Age: 88
End: 2023-03-01
Payer: MEDICARE

## 2023-03-01 ENCOUNTER — ANTICOAGULATION VISIT (OUTPATIENT)
Dept: PHARMACY | Facility: HOSPITAL | Age: 88
End: 2023-03-01

## 2023-03-01 DIAGNOSIS — Z95.4 HISTORY OF AORTIC VALVE REPLACEMENT WITH METALLIC VALVE: Primary | ICD-10-CM

## 2023-03-01 DIAGNOSIS — I63.411 CEREBROVASCULAR ACCIDENT (CVA) DUE TO EMBOLISM OF RIGHT MIDDLE CEREBRAL ARTERY: ICD-10-CM

## 2023-03-01 LAB
INR PPP: 3.5
INR PPP: 3.5

## 2023-03-01 NOTE — PROGRESS NOTES
Anticoagulation Clinic Progress Note    Anticoagulation Summary  As of 3/1/2023    INR goal:  3.0-3.5   TTR:  70.4 % (4.1 y)   INR used for dosing:  3.50 (3/1/2023)   Warfarin maintenance plan:  5 mg every day; Starting 3/1/2023   Weekly warfarin total:  35 mg   No change documented:  Carmen El PharmD   Plan last modified:  Carmen El PharmD (2/24/2023)   Next INR check:  3/3/2023   Priority:  High   Target end date:  Indefinite    Indications    History of aortic valve replacement with metallic valve [Z95.4]  Atrial fibrillation (HCC) [I48.91]  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]             Anticoagulation Episode Summary     INR check location:      Preferred lab:      Send INR reminders to:  [facility] [place]  POOL    Comments:  New INR goal 3 - 3.5 (see 1/2020 hospitalization for CVA)      Anticoagulation Care Providers     Provider Role Specialty Phone number    Griffin Fried MD Referring Cardiology [phone]          Clinic Interview:  Patient Findings     Negatives:  Signs/symptoms of thrombosis, Signs/symptoms of bleeding,   Laboratory test error suspected, Change in health, Change in alcohol use,   Change in activity, Upcoming invasive procedure, Emergency department   visit, Upcoming dental procedure, Missed doses, Extra doses, Change in   medications, Change in diet/appetite, Hospital admission, Bruising, Other   complaints      Clinical Outcomes     Negatives:  Major bleeding event, Thromboembolic event,   Anticoagulation-related hospital admission, Anticoagulation-related ED   visit, Anticoagulation-related fatality        INR History:  Anticoagulation Monitoring 2/27/2023 3/1/2023 3/1/2023   INR 3.50 3.50 3.50   INR Date 2/27/2023 3/1/2023 3/1/2023   INR Goal 3.0-3.5 3.0-3.5 3.0-3.5   Trend Same Same Same   Last Week Total 37.5 mg 35 mg 35 mg   Next Week Total 35 mg 35 mg 35 mg   Sun - 5 mg -   Mon 5 mg 5 mg -    Tue 5 mg 5 mg -   Wed - 5 mg 5 mg   Thu - 5 mg 5 mg   Fri - 5 mg -   Sat - 5 mg -   Visit Report - - -   Some recent data might be hidden       Plan:  1. INR is Therapeutic today- see above in Anticoagulation Summary.   Will instruct LAURA Sequeira to Continue their warfarin regimen- see above in Anticoagulation Summary.  2. Follow up in 3 dayd  3. They have been instructed to call if any changes in medications, doses, concerns, etc. Patient expresses understanding and has no further questions at this time.    Carmen El, PharmD

## 2023-03-02 ENCOUNTER — HOME CARE VISIT (OUTPATIENT)
Dept: HOME HEALTH SERVICES | Facility: HOME HEALTHCARE | Age: 88
End: 2023-03-02
Payer: COMMERCIAL

## 2023-03-02 VITALS
HEART RATE: 79 BPM | DIASTOLIC BLOOD PRESSURE: 78 MMHG | OXYGEN SATURATION: 99 % | SYSTOLIC BLOOD PRESSURE: 128 MMHG | TEMPERATURE: 98.2 F

## 2023-03-02 PROCEDURE — G0153 HHCP-SVS OF S/L PATH,EA 15MN: HCPCS

## 2023-03-02 NOTE — HOME HEALTH
Patient was awaiting therapist arrival and reported doing well. Patient reinstructed in HEP. Patient continues to perform HEP daily, 2-3 times per day. VFSS is scheduled at [facility] for 3/24/23 to assess swallow prior to advancement of diet.    PLAN FOR NEXT VISIT   MEDICAL NECESSITY FOR ONGOING SKILLED THERAPY: Dysphagia therapy to assess/evaluate least restrictive oral intake, minimize aspiration/risk of and restore oropharyngeal function in order to enhance patient's quality of life by improving ability to safely consume least restrictive diet.   Skilled Speech Therapy interventions are necessary due to decline in swallowing function and increased risk of aspiration.   SPECIFIC INTERVENTIONS AND GOALS TO ADDRESS ON NEXT VISIT:   Renstructions in HEP to increase swallowing function and strength   FREQUENCY AND DURATION: 2w4   ANY OTHER FOLLOW UP NEEDED: VFSS to assess swallow prior to advancement in diet   REASSESSMENT DUE DATE: 3/11/23

## 2023-03-03 ENCOUNTER — LAB (OUTPATIENT)
Dept: LAB | Facility: HOSPITAL | Age: 88
End: 2023-03-03
Payer: COMMERCIAL

## 2023-03-03 ENCOUNTER — ANTICOAGULATION VISIT (OUTPATIENT)
Dept: PHARMACY | Facility: HOSPITAL | Age: 88
End: 2023-03-03
Payer: MEDICARE

## 2023-03-03 DIAGNOSIS — I63.411 CEREBROVASCULAR ACCIDENT (CVA) DUE TO EMBOLISM OF RIGHT MIDDLE CEREBRAL ARTERY: ICD-10-CM

## 2023-03-03 DIAGNOSIS — I48.21 PERMANENT ATRIAL FIBRILLATION: ICD-10-CM

## 2023-03-03 DIAGNOSIS — Z95.4 HISTORY OF AORTIC VALVE REPLACEMENT WITH METALLIC VALVE: Primary | ICD-10-CM

## 2023-03-03 LAB
INR PPP: 3.4
INR PPP: 3.5

## 2023-03-03 PROCEDURE — G0248 DEMONSTRATE USE HOME INR MON: HCPCS

## 2023-03-03 PROCEDURE — 80048 BASIC METABOLIC PNL TOTAL CA: CPT | Performed by: STUDENT IN AN ORGANIZED HEALTH CARE EDUCATION/TRAINING PROGRAM

## 2023-03-03 PROCEDURE — G0249 PROVIDE INR TEST MATER/EQUIP: HCPCS

## 2023-03-03 NOTE — PROGRESS NOTES
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]             Anticoagulation Episode Summary     INR check location:      Preferred lab:      Send INR reminders to:  [facility]    Comments:  **Home testing training 3/3/23** New INR goal 3 - 3.5 (see 1/2020 hospitalization for CVA)      Anticoagulation Care Providers     Provider Role Specialty Phone number    Griffin Fried MD Referring Cardiology [phone]          Clinic Interview:  Patient Findings     Positives:  Change in diet/appetite, Other complaints    Negatives:  Signs/symptoms of thrombosis, Signs/symptoms of bleeding,   Laboratory test error suspected, Change in health, Change in alcohol use,   Change in activity, Upcoming invasive procedure, Emergency department   visit, Upcoming dental procedure, Missed doses, Extra doses, Change in   medications, Hospital admission, Bruising    Comments:  Reports he has lost ~50 lbs as a result of his most recent   hospitalization. Reports lower appetite than prior to his hospitalization.         Clinical Outcomes     Negatives:  Major bleeding event, Thromboembolic event,   Anticoagulation-related hospital admission, Anticoagulation-related ED   visit, Anticoagulation-related fatality    Comments:  Reports he has lost ~50 lbs as a result of his most recent    hospitalization. Reports lower appetite than prior to his hospitalization.           INR History:  Anticoagulation Monitoring 3/1/2023 3/1/2023 3/3/2023   INR 3.50 3.50 3.50   INR Date 3/1/2023 3/1/2023 3/3/2023   INR Goal 3.0-3.5 3.0-3.5 3.0-3.5   Trend Same Same Same   Last Week Total 35 mg 35 mg 35 mg   Next Week Total 35 mg 35 mg 35 mg   Sun 5 mg - 5 mg   Mon 5 mg - 5 mg   Tue 5 mg - -   Wed 5 mg 5 mg -   Thu 5 mg 5 mg -   Fri 5 mg - 5 mg   Sat 5 mg - 5 mg   Visit Report - - -   Some recent data might be hidden       Equipment Provided to Patient:   [employer]-Sense - Serial Number: [id number]    Coag-Sense POC INR result performed by clinic staff: 3.4  Coag-Sense POC INR result performed by patient and/or caregiver: 3.5  Patient successfully completed self test on 1st attempt.    Education:  LAURA Sequeira is newly starting home testing through the [facility] home testing service. he expressed consent to adhere to the Home INR Monitoring guidelines as prescribed by the [facility].      Home testing training was provided in clinic. The following items were discussed and demonstrated:  1. Explained to patient that any cost not covered by his insurance is the responsibility of the patient. It is the responsibility of LAURA Sequeira to determine the cost of the service prior to proceeding.  2. Reviewed contents included within the Co[employer]-Sense testing box including reference materials, meter, power cord, carrying case, and lancets. Discussed proper usage of all materials including turning on and setting up meter.  3. Instructed patient to get supplies out and ready prior to testing, including lancet, test strip, transfer tube, alcohol pad, bandage (optional). Instructed patient that if alcohol pads not available to wash hands with soap and water.  4. Reviewed use of test strips and process to confirm correct lot number and barcode number.   5. Reviewed how to use lancet  including where on the finger to prick and recommendations for collecting the sample.  6. Instructed patient to contact [facility] after first failed attempt for further instruction in order to avoid wasting supplies.  7. Reviewed how to dispose of all materials used during testing (i.e. sharps container or strong plastic container)  8. Explained that weekly testing is required, results should be reported to the [facility], and INR results will be addressed within one business day.  9. Instructed patient to call his referring physician if INR results require immediate attention and it is after clinic hours.  10. Informed patient that the meter is a loan rather than being owned by the patient. Should home testing services be discontinued for any reason, the meter must be returned to the [facility]. Repeated failure to comply with recommended testing intervals or non-adherence to recommendations will result in the patient being transitioned back to in-clinic visits.    Patient and/or caregiver successfully demonstrated correct use of Coag-Sense machine. All patient questions were answered satisfactorily.     Patient has agreed to only be called if INR is out of range - no (except in the case when patient reports a change or concern that would affect warfarin management upon submitting his INR result).     Plan:  1. INR is Therapeutic today. Instructed LAURA Sequeira to Continue their warfarin regimen of 5 mg daily- see above in Anticoagulation Summary.  2. Follow-up with home test in 4 days. Follow-up in clinic at least once per year.   3. Coag-Sense machine was loaned to patient as above. Test strips (#6), lancets (#8), transfer tubes (#54) were provided.   4. Patient declines warfarin refills.  5. Verbal and printed information was provided. They have been instructed to call if any changes in medications, doses, concerns, etc. Patient expresses understanding  and has no further questions at this time.    Vasquez Niño, PharmD

## 2023-03-07 ENCOUNTER — ANTICOAGULATION VISIT (OUTPATIENT)
Dept: PHARMACY | Facility: HOSPITAL | Age: 88
End: 2023-03-07
Payer: MEDICARE

## 2023-03-07 ENCOUNTER — HOME CARE VISIT (OUTPATIENT)
Dept: HOME HEALTH SERVICES | Facility: HOME HEALTHCARE | Age: 88
End: 2023-03-07
Payer: COMMERCIAL

## 2023-03-07 VITALS
HEART RATE: 63 BPM | SYSTOLIC BLOOD PRESSURE: 124 MMHG | OXYGEN SATURATION: 97 % | TEMPERATURE: 97.8 F | DIASTOLIC BLOOD PRESSURE: 72 MMHG | RESPIRATION RATE: 18 BRPM

## 2023-03-07 DIAGNOSIS — Z95.4 HISTORY OF AORTIC VALVE REPLACEMENT WITH METALLIC VALVE: Primary | ICD-10-CM

## 2023-03-07 DIAGNOSIS — I63.411 CEREBROVASCULAR ACCIDENT (CVA) DUE TO EMBOLISM OF RIGHT MIDDLE CEREBRAL ARTERY: ICD-10-CM

## 2023-03-07 LAB — INR PPP: 3.2

## 2023-03-07 PROCEDURE — G0153 HHCP-SVS OF S/L PATH,EA 15MN: HCPCS

## 2023-03-07 NOTE — PROGRESS NOTES
Anticoagulation Clinic Progress Note    Anticoagulation Summary  As of 3/7/2023    INR goal:  3.0-3.5   TTR:  70.5 % (4.1 y)   INR used for dosing:  3.20 (3/7/2023)   Warfarin maintenance plan:  5 mg every day; Starting 3/7/2023   Weekly warfarin total:  35 mg   No change documented:  Carmen El PharmD   Plan last modified:  Carmen El PharmD (2/24/2023)   Next INR check:  3/17/2023   Priority:  High   Target end date:  Indefinite    Indications    History of aortic valve replacement with metallic valve [Z95.4]  Atrial fibrillation (HCC) [I48.91]  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]             Anticoagulation Episode Summary     INR check location:      Preferred lab:      Send INR reminders to:  [facility]    Comments:  **Home testing training 3/3/23** *CALL EVERY TIME* New INR goal 3 - 3.5 (see 1/2020 hospitalization for CVA)      Anticoagulation Care Providers     Provider Role Specialty Phone number    Griffin Fried MD Referring Cardiology [phone]          Clinic Interview:  Patient Findings     Negatives:  Signs/symptoms of thrombosis, Signs/symptoms of bleeding,   Laboratory test error suspected, Change in health, Change in alcohol use,   Change in activity, Upcoming invasive procedure, Emergency department   visit, Upcoming dental procedure, Missed doses, Extra doses, Change in   medications, Change in diet/appetite, Hospital admission, Bruising, Other   complaints      Clinical Outcomes     Negatives:  Major bleeding event, Thromboembolic event,   Anticoagulation-related hospital admission, Anticoagulation-related ED   visit, Anticoagulation-related fatality        INR History:  Anticoagulation Monitoring 3/1/2023 3/3/2023 3/7/2023   INR 3.50 3.50 3.20   INR Date 3/1/2023 3/3/2023 3/7/2023   INR Goal 3.0-3.5 3.0-3.5 3.0-3.5   Trend Same Same Same   Last Week Total 35 mg 35 mg 35 mg   Next Week Total 35 mg  35 mg 35 mg   Sun - 5 mg 5 mg   Mon - 5 mg 5 mg   Tue - - 5 mg   Wed 5 mg - 5 mg   Thu 5 mg - 5 mg   Fri - 5 mg 5 mg   Sat - 5 mg 5 mg   Visit Report - - -   Some recent data might be hidden       Plan:  1. INR is Therapeutic today- see above in Anticoagulation Summary.   Will instruct LAURA Sequeira to Continue their warfarin regimen- see above in Anticoagulation Summary.  2. Follow up in 1 weeks  3. Spoke to patient's spouse, Gladys.  They have been instructed to call if any changes in medications, doses, concerns, etc. Patient expresses understanding and has no further questions at this time.    Carmen El, PharmD

## 2023-03-08 NOTE — HOME HEALTH
Patient greeted therapist and reported doing well. VFSS is scheduled at [facility] on 3/24/23 to assess swallow function prior to advancement of diet.     PLAN FOR NEXT VISIT   MEDICAL NECESSITY FOR ONGOING SKILLED THERAPY: Dysphagia therapy to assess/evaluate least restrictive oral intake, minimize aspiration/risk of and restore oropharyngeal function in order to enhance patient's quality of life by improving ability to safely consume least restrictive diet.   Skilled Speech Therapy interventions are necessary due to decline in swallowing function and increased risk of aspiration.   SPECIFIC INTERVENTIONS AND GOALS TO ADDRESS ON NEXT VISIT:   Discharge instructions   FREQUENCY AND DURATION: 2w4   ANY OTHER FOLLOW UP NEEDED: VFSS to assess swallow prior to advancement in diet   REASSESSMENT DUE DATE: 3/11/23

## 2023-03-09 ENCOUNTER — HOME CARE VISIT (OUTPATIENT)
Dept: HOME HEALTH SERVICES | Facility: HOME HEALTHCARE | Age: 88
End: 2023-03-09
Payer: COMMERCIAL

## 2023-03-09 VITALS
SYSTOLIC BLOOD PRESSURE: 130 MMHG | TEMPERATURE: 97.8 F | OXYGEN SATURATION: 99 % | HEART RATE: 62 BPM | DIASTOLIC BLOOD PRESSURE: 64 MMHG

## 2023-03-09 PROCEDURE — G0153 HHCP-SVS OF S/L PATH,EA 15MN: HCPCS

## 2023-03-10 ENCOUNTER — OFFICE VISIT (OUTPATIENT)
Dept: INTERNAL MEDICINE | Facility: CLINIC | Age: 88
End: 2023-03-10
Payer: COMMERCIAL

## 2023-03-10 VITALS
BODY MASS INDEX: 22.48 KG/M2 | SYSTOLIC BLOOD PRESSURE: 130 MMHG | HEART RATE: 77 BPM | WEIGHT: 166 LBS | DIASTOLIC BLOOD PRESSURE: 70 MMHG | HEIGHT: 72 IN | OXYGEN SATURATION: 98 %

## 2023-03-10 DIAGNOSIS — E11.22 TYPE 2 DIABETES MELLITUS WITH STAGE 3B CHRONIC KIDNEY DISEASE, WITH LONG-TERM CURRENT USE OF INSULIN: Primary | ICD-10-CM

## 2023-03-10 DIAGNOSIS — E87.5 HYPERKALEMIA: ICD-10-CM

## 2023-03-10 DIAGNOSIS — Z79.4 TYPE 2 DIABETES MELLITUS WITH STAGE 3B CHRONIC KIDNEY DISEASE, WITH LONG-TERM CURRENT USE OF INSULIN: Primary | ICD-10-CM

## 2023-03-10 DIAGNOSIS — N18.32 STAGE 3B CHRONIC KIDNEY DISEASE: ICD-10-CM

## 2023-03-10 DIAGNOSIS — N18.32 TYPE 2 DIABETES MELLITUS WITH STAGE 3B CHRONIC KIDNEY DISEASE, WITH LONG-TERM CURRENT USE OF INSULIN: Primary | ICD-10-CM

## 2023-03-10 PROCEDURE — 99214 OFFICE O/P EST MOD 30 MIN: CPT | Performed by: STUDENT IN AN ORGANIZED HEALTH CARE EDUCATION/TRAINING PROGRAM

## 2023-03-10 RX ORDER — DIGOXIN 125 MCG
125 TABLET ORAL
COMMUNITY

## 2023-03-10 NOTE — PROGRESS NOTES
Celestine English D.O.  Internal Medicine  [facility]  [address], [address]  [place] [address]  [phone]      Chief Complaint  Diabetes    SUBJECTIVE    History of Present Illness    LAURA Sequeira is a 87 y.o. male who presents to the office today as an established patient that last saw me on 2/9/2023.   Here today with his wife who helps provide history.    Type 2 diabetes: still using VGO 40 , takes up to 4 clicks per day on average. He records his blood at home but doesn't have the log with him today. Average fasting sugar per his wife's report is between . They deny any symptoms of low blood sugar. He uses a freestyle lisa.     Hyperkalemia: has cut back on certain foods like bananas and watermelon since last visit    Allergies   Allergen Reactions   • Penicillins Swelling   • Oxycodone-Acetaminophen Nausea And Vomiting   • Other Other (See Comments)     Grass, ragweed   • Percocet [Oxycodone-Acetaminophen] Nausea And Vomiting        Outpatient Medications Marked as Taking for the 3/10/23 encounter (Office Visit) with Celestine English, DO   Medication Sig Dispense Refill   • acetaminophen (TYLENOL) 325 MG tablet Take 2 tablets by mouth Every 6 (Six) Hours As Needed for Mild Pain .     • atorvastatin (LIPITOR) 40 MG tablet TAKE ONE TABLET BY MOUTH DAILY (Patient taking differently: Take 1 tablet by mouth Daily.) 90 tablet 1   • digoxin (LANOXIN) 125 MCG tablet Take 1 tablet by mouth.     • diphenhydrAMINE (BENADRYL) 25 mg capsule Take 1 capsule by mouth 2 (Two) Times a Day As Needed for Itching, Allergies or Sleep.     • famotidine (PEPCID) 20 MG tablet TAKE ONE TABLET BY MOUTH DAILY 90 tablet 1   • ferrous gluconate (FERGON) 324 MG tablet TAKE ONE TABLET BY MOUTH EVERY OTHER DAY (Patient taking differently: Take 1 tablet by mouth Daily With Breakfast.) 45 tablet 0   • Insulin Disposable Pump (V-Go 40) kit Wear each V-Go for one 24-hour period. At the end of the 24-hour  "period, retract the needle by sliding and then pushing the Needle Release Button and remove the V-Go from your body 30 each 2   • insulin lispro (humaLOG) 100 UNIT/ML injection Inject  under the skin into the appropriate area as directed Take As Directed. Use as directed with V-Go Pump--NOT TAKING DUE TO LOW BG LEVELS SINCE HOSPITALIZATION      • magnesium oxide (MAG-OX) 400 MG tablet Take 1 tablet by mouth 2 (Two) Times a Day.     • warfarin (COUMADIN) 5 MG tablet Take one tablet by mouth daily or as directed. 90 tablet 1        Past Medical History:   Diagnosis Date   • Allergic rhinitis    • Aortic valve insufficiency    • Ascending aortic aneurysm    • Aspiration pneumonia (HCC)    • Atrial fibrillation (HCC)    • Bacteremia    • Calcific aortic stenosis of bicuspid valve    • Cardiac arrest (HCC)    • Cataracts, bilateral    • CKD (chronic kidney disease) stage 3, GFR 30-59 ml/min (HCC)    • Contact dermatitis due to poison ivy    • Elbow fracture    • GERD (gastroesophageal reflux disease)    • GI bleed     2021; s/p Colonoscopy and EGD   • H/O mechanical aortic valve replacement    • Head injury    • History of transfusion    • Hyperlipidemia    • Hypertension    • Kidney carcinoma (HCC)    • 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   Constitutional:       General: He is not in acute distress.     Appearance: Normal appearance. He is normal weight. He is ill-appearing (chronically).   HENT:      Head: Atraumatic.   Eyes:      General: No scleral icterus.  Cardiovascular:      Rate and " Rhythm: Normal rate. Rhythm irregular.      Comments: Mechanical valve click present  Pulmonary:      Effort: Pulmonary effort is normal.      Breath sounds: No wheezing.      Comments: Decreased breath sounds on the right base  Musculoskeletal:      Right lower leg: No edema.      Left lower leg: No edema.   Skin:     Coloration: Skin is not jaundiced.   Neurological:      Mental Status: He is alert.   Psychiatric:         Mood and Affect: Mood normal.         Behavior: Behavior normal.         Thought Content: Thought content normal.                             ASSESSMENT & PLAN     Diagnoses and all orders for this visit:    1. 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 Stage 3b chronic kidney disease (HCC)  3. Hyperkalemia  -still using VGO 40 , takes up to 4 clicks per day on average. He records his blood at home but doesn't have the log with him today. Average fasting sugar per his wife's report is between . They deny any symptoms of low blood sugar. He uses a freestyle lisa.   -For hyperkalemia, he has cut back on certain foods like bananas and watermelon since last visit  -last a1c 12/2022 at his nephrologist reviewed was 7.4; given his number of chronic medical conditions and age his goal is less than 8   -continue to monitor very carefully for signs of hypoglycemia as he may need to reduce or stop the VGO system; he and his wife are very intent on staying on this system for now as he has used it for so long   Lab Results   Component Value Date    GLUCOSE 92 03/03/2023    CALCIUM 9.4 03/03/2023     03/03/2023    K 5.4 (H) 03/03/2023    CO2 25.6 03/03/2023     03/03/2023    BUN 27 (H) 03/03/2023    CREATININE 1.45 (H) 03/03/2023    EGFRRESULT 42 (L) 08/26/2022    EGFR 47.2 (L) 03/03/2023    BCR 18.6 03/03/2023    ANIONGAP 8.4 03/03/2023     -potassium has decreased to 5.4 from 5.7 (2/9/23) after making some  dietary changes  -provided pt a listing of dietary suggestions for low potassium foods and high potassium foods to avoid         The following social determinates of health impact the patient's medical decision making: No social determinates of health were factored in to today's visit.     Follow Up  Return in about 3 months (around 6/10/2023) for Recheck.    Patient/family had no further questions at this time and verbalized understanding of the plan discussed today.

## 2023-03-11 LAB — HBA1C MFR BLD: 7.3 % (ref 4.8–5.6)

## 2023-03-17 ENCOUNTER — ANTICOAGULATION VISIT (OUTPATIENT)
Dept: PHARMACY | Facility: HOSPITAL | Age: 88
End: 2023-03-17
Payer: MEDICARE

## 2023-03-17 DIAGNOSIS — I63.411 CEREBROVASCULAR ACCIDENT (CVA) DUE TO EMBOLISM OF RIGHT MIDDLE CEREBRAL ARTERY: ICD-10-CM

## 2023-03-17 DIAGNOSIS — Z95.4 HISTORY OF AORTIC VALVE REPLACEMENT WITH METALLIC VALVE: Primary | ICD-10-CM

## 2023-03-17 LAB — INR PPP: 3.6

## 2023-03-17 NOTE — PROGRESS NOTES
Anticoagulation Clinic Progress Note    Anticoagulation Summary  As of 3/17/2023    INR goal:  3.0-3.5   TTR:  70.4 % (4.2 y)   INR used for dosing:  3.60 (3/17/2023)   Warfarin maintenance plan:  5 mg every day; Starting 3/17/2023   Weekly warfarin total:  35 mg   No change documented:  Vasquez Niño, PharmD   Plan last modified:  Carmen El, PharmD (2/24/2023)   Next INR check:  3/24/2023   Priority:  High   Target end date:  Indefinite    Indications    History of aortic valve replacement with metallic valve [Z95.4]  Atrial fibrillation (HCC) [I48.91]  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]             Anticoagulation Episode Summary     INR check location:      Preferred lab:      Send INR reminders to:  [facility]    Comments:  **Home testing training 3/3/23** *CALL EVERY TIME* New INR goal 3 - 3.5 (see 1/2020 hospitalization for CVA)      Anticoagulation Care Providers     Provider Role Specialty Phone number    Griffin Fried MD Referring Cardiology [phone]          Clinic Interview:  Patient Findings     Positives:  Change in alcohol use    Negatives:  Signs/symptoms of thrombosis, Signs/symptoms of bleeding,   Laboratory test error suspected, Change in health, Change in activity,   Upcoming invasive procedure, Emergency department visit, Upcoming dental   procedure, Missed doses, Extra doses, Change in medications, Change in   diet/appetite, Hospital admission, Bruising, Other complaints    Comments:  Reports small glass of wine earlier this week.      Clinical Outcomes     Negatives:  Major bleeding event, Thromboembolic event,   Anticoagulation-related hospital admission, Anticoagulation-related ED   visit, Anticoagulation-related fatality    Comments:  Reports small glass of wine earlier this week.        INR History:  Anticoagulation Monitoring 3/3/2023 3/7/2023 3/17/2023   INR 3.50 3.20 3.60   INR Date 3/3/2023  3/7/2023 3/17/2023   INR Goal 3.0-3.5 3.0-3.5 3.0-3.5   Trend Same Same Same   Last Week Total 35 mg 35 mg 35 mg   Next Week Total 35 mg 35 mg 35 mg   Sun 5 mg 5 mg 5 mg   Mon 5 mg 5 mg 5 mg   Tue - 5 mg 5 mg   Wed - 5 mg 5 mg   Thu - 5 mg 5 mg   Fri 5 mg 5 mg 5 mg   Sat 5 mg 5 mg 5 mg   Visit Report - - -   Some recent data might be hidden       Plan:  1. INR is Supratherapeutic today- see above in Anticoagulation Summary.   Will instruct LAURA Sequeira to Continue their warfarin regimen- see above in Anticoagulation Summary.  2. Follow up in 1 week.  3. They have been instructed to call if any changes in medications, doses, concerns, etc. Patient expresses understanding and has no further questions at this time.    Vasquez Niño, PharmD

## 2023-03-24 ENCOUNTER — HOSPITAL ENCOUNTER (OUTPATIENT)
Dept: GENERAL RADIOLOGY | Facility: HOSPITAL | Age: 88
Discharge: HOME | End: 2023-03-24
Admitting: STUDENT IN AN ORGANIZED HEALTH CARE EDUCATION/TRAINING PROGRAM
Payer: COMMERCIAL

## 2023-03-24 ENCOUNTER — ANTICOAGULATION VISIT (OUTPATIENT)
Dept: PHARMACY | Facility: HOSPITAL | Age: 88
End: 2023-03-24
Payer: MEDICARE

## 2023-03-24 DIAGNOSIS — Z95.4 HISTORY OF AORTIC VALVE REPLACEMENT WITH METALLIC VALVE: Primary | ICD-10-CM

## 2023-03-24 DIAGNOSIS — I63.411 CEREBROVASCULAR ACCIDENT (CVA) DUE TO EMBOLISM OF RIGHT MIDDLE CEREBRAL ARTERY: ICD-10-CM

## 2023-03-24 DIAGNOSIS — J18.9 PNEUMONIA OF RIGHT LOWER LOBE DUE TO INFECTIOUS ORGANISM: ICD-10-CM

## 2023-03-24 LAB — INR PPP: 2.8

## 2023-03-24 PROCEDURE — 74230 X-RAY XM SWLNG FUNCJ C+: CPT

## 2023-03-24 PROCEDURE — 92611 MOTION FLUOROSCOPY/SWALLOW: CPT

## 2023-03-24 RX ADMIN — BARIUM SULFATE 50 ML: 400 SUSPENSION ORAL at 13:18

## 2023-03-24 RX ADMIN — BARIUM SULFATE 4 ML: 980 POWDER, FOR SUSPENSION ORAL at 13:17

## 2023-03-24 RX ADMIN — BARIUM SULFATE 55 ML: 0.81 POWDER, FOR SUSPENSION ORAL at 13:17

## 2023-03-24 RX ADMIN — BARIUM SULFATE 1 TEASPOON(S): 0.6 CREAM ORAL at 13:17

## 2023-03-24 RX ADMIN — BARIUM SULFATE 50 ML: 400 SUSPENSION ORAL at 13:17

## 2023-03-24 NOTE — MBS/VFSS/FEES
"Outpatient Speech Language Pathology   Adult Swallow Initial Evaluation  [facility]     Patient Name: LAURA Sequeira  : 1935  MRN: [id number]  Today's Date: 3/24/2023         Visit Date: 2023     SPEECH-LANGUAGE PATHOLOGY EVALUTION - VFSS  Subjective: The patient was seen on this date for a VFSS(Videofluoroscopic Swallowing Study).  Patient was alert and cooperative.    Significant history:   Objective: Risks/benefits were reviewed with the patient, and consent was obtained. The study was completed with SLP and Radiologist present. The patient was seen in lateral view(s). Textures given included thin liquid, nectar thick liquid, honey thick liquid, puree consistency, mechanical soft consistency, and regular consistency.  Assessment:  VFSS completed, radiologist Dr. Brand present. Noted some calcification and anterior bulging just below the cricopharyngeus (CP) at C5-7 at baseline. Silent penetration/aspiration with thins by cup. No pen/asp with cued \"very small\" sip of thin. Transient penetration with NTL by cup inconsistently. No pen/asp with HTL, puree, mechanical soft chopped, mixed, and regular solids. Mild residue in vallecula with puree, mechanical soft chopped, and mixed. Moderate residue in vallecula with regular solids. Nectar thick liquid wash cleared some, but not all residue. Recommend NTL and mechanical soft chopped, water protocol between meals after oral care, meds crushed with puree. Reviewed results of evaluation with patient and spouse. Discussed instances of penetration and aspiration, and risk of pneumonia. Educated patient and spouse about swallow precautions including small sips of thin, alternating between small bites and sips, adequate mastication, and throat clear followed by a second swallow after liquid sips. Provided patient and spouse with handouts detailing water protocol and swallow precautions. Patient and spouse verbalized understanding.  SLP Findings: Patient " "presents with mild to moderate oropharyngeal dysphagia.   Recommendations: Pt known to be non-compliant with thickening of liquids.  Diet Textures: nectar thick liquid, mechanical soft consistency food. Medications should be taken whole or crushed with thickened liquids or puree. May have water and Ice between meals after oral care, under staff or family supervision and with the recommended strategies for safe swallowing.  Recommended Strategies: Upright for PO, small bites and sips, and alternate liquids and solids. Also recommended pt to periodically throat clear during meals. Oral care before breakfast, after all meals and PRN.  Strategies reviewed and written copy provided to family.    Patient and family reported non-compliance with thick liquids.  During study, a \"very small sip\" (like drinking hot coffee) trialed with thin liquids and no aspiration noted.  Suspect pt will not be successful in monitoring this sip size independently; however, reviewed this recommendation with pt and family.      Patient Active Problem List   Diagnosis    Atrial fibrillation (HCC)    Hypertension    Atopic rhinitis    Gastroesophageal reflux disease    Hyperlipidemia    Type 2 diabetes mellitus (HCC)    Low testosterone    History of aortic valve replacement with metallic valve    Kidney carcinoma (HCC)    Stage 3b chronic kidney disease (HCC)    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)    Iron deficiency anemia    Chronic anticoagulation    Hemiparesis of left nondominant side as late effect of cerebral infarction (HCC)    Rectus sheath hematoma    Hospital discharge follow-up    Sepsis (HCC)    Calculus of gallbladder with acute cholecystitis without obstruction    History of Clostridium difficile infection    Aspiration pneumonitis (HCC)    Hematoma of iliopsoas muscle, left, initial encounter    History of CVA (cerebrovascular accident)    S/P laparoscopic cholecystectomy    Anemia "    Hematoma of left iliopsoas muscle    Pneumonia of right lower lobe due to infectious organism    Supratherapeutic INR        Past Medical History:   Diagnosis Date    Allergic rhinitis     Aortic valve insufficiency     Ascending aortic aneurysm     Aspiration pneumonia (HCC)     Atrial fibrillation (HCC)     Bacteremia     Calcific aortic stenosis of bicuspid valve     Cardiac arrest (HCC)     Cataracts, bilateral     CKD (chronic kidney disease) stage 3, GFR 30-59 ml/min (HCC)     Contact dermatitis due to poison ivy     Elbow fracture     GERD (gastroesophageal reflux disease)     GI bleed     2021; s/p Colonoscopy and EGD    H/O mechanical aortic valve replacement     Head injury     History of transfusion     Hyperlipidemia     Hypertension     Kidney carcinoma (HCC)     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: follows with [facility] for management    Nonischemic cardiomyopathy (HCC)     Prostate cancer (HCC)     Renal oncocytoma     Stroke (cerebrum) (HCC)     Type 2 diabetes mellitus (HCC)     Visual field defect         Past Surgical History:   Procedure Laterality Date    AORTIC VALVE REPAIR/REPLACEMENT      ASCENDING AORTIC ANEURYSM REPAIR W/ MECHANICAL AORTIC VALVE REPLACEMENT      CHOLECYSTECTOMY WITH INTRAOPERATIVE CHOLANGIOGRAM N/A 03/29/2022    Procedure: Laparoscopic cholecystectomy with cholangiogram, possible open;  Surgeon: Lisa Guidry MD;  Location: [facility] MAIN OR;  Service: General;  Laterality: N/A;    COLONOSCOPY N/A 10/28/2021    Procedure: COLONOSCOPY to cecum:  cold snare polyps,;  Surgeon: Dinesh Meza MD;  Location: [facility] ENDOSCOPY;  Service: Gastroenterology;  Laterality: N/A;  pre:  Iron deficiency anemia  post:  polyps, diverticulosis,     COLONOSCOPY N/A 11/09/2021    Procedure: COLONOSCOPY to cecum with APC cautery of AVM and clip placement x1;  Surgeon: Pavan Rodriguez MD;  Location: [facility] ENDOSCOPY;  Service:  Gastroenterology;  Laterality: N/A;  PRE - gi bleed, anemia  POST - diverticulosis, poor prep, AVM right colon    ENDOSCOPY N/A 10/28/2021    Procedure: ESOPHAGOGASTRODUODENOSCOPY with biopsies;  Surgeon: Dinesh Meza MD;  Location: [facility] ENDOSCOPY;  Service: Gastroenterology;  Laterality: N/A;  pre:  Iron deficiency anemia  post:  duodenitis and gastritis    EYE SURGERY  12/09/2020    cataract surgery     NEPHRECTOMY      OTHER SURGICAL HISTORY      elbow surgery    OTHER SURGICAL HISTORY      right arm surgery    PROSTATE SURGERY      prostate removal    THORACENTESIS Left     diagnostic         Visit Dx:     ICD-10-CM ICD-9-CM   1. Pneumonia of right lower lobe due to infectious organism  J18.9 486            OP SLP Assessment/Plan - 03/24/23 1511          SLP Assessment    Functional Problems Swallowing (P)   -AO    Impact on Function: Swallowing Risk of aspiration;Risk of pneumonia (P)   -AO    Clinical Impression: Swallowing Moderate:;oropharyngeal phase dysphagia (P)   -AO    Please refer to paper survey for additional self-reported information Yes (P)   -AO    Please refer to items scanned into chart for additional diagnostic informaiton and handouts as provided by clinician Yes (P)   -AO    Prognosis Good (comment) (P)   -AO    Patient/caregiver participated in establishment of treatment plan and goals Yes (P)   -AO    Patient would benefit from skilled therapy intervention -- (P)    completed home health swallow therapy -AO       SLP Plan    Frequency eval only (P)   -AO              User Key  (r) = Recorded By, (t) = Taken By, (c) = Cosigned By      Initials Name Provider Type    AO Chip Trevino Speech Therapy Student SLP Student                     SLP Adult Swallow Evaluation       Row Name 03/24/23 1300       Rehab Evaluation    Document Type evaluation (P)   -AO    Subjective Information no complaints (P)   -AO    Patient Observations alert;cooperative (P)   -AO    Patient Effort good (P)    -AO    Symptoms Noted During/After Treatment none (P)   -AO       General Information    Patient Profile Reviewed yes (P)   -AO    Pertinent History Of Current Problem History of oral pharyngeal dysphagia and pneumonia. VFSS 2/2023 revealed silent penetration and aspiration with thins, and moderate residue with regular solids. Recommended NTL and mechanical soft chopped. Patient reports he does not follow liquid recommendations and consumes thin liquids and soft to chew foods. (P)   -AO    Current Method of Nutrition thin liquids;soft to chew textures (P)   -AO    Precautions/Limitations, Vision WFL;for purposes of eval (P)   -AO    Precautions/Limitations, Hearing hearing aid, bilaterally (P)   -AO    Prior Level of Function-Communication WFL (P)   -AO    Prior Level of Function-Swallowing no diet consistency restrictions (P)   -AO    Plans/Goals Discussed with patient;spouse/S.O. (P)   -AO    Barriers to Rehab none identified (P)   -AO       Pain    Additional Documentation Pain Scale: FACES Pre/Post-Treatment (Group) (P)   -AO       Pain Scale: FACES Pre/Post-Treatment    Pain: FACES Scale, Pretreatment 0-->no hurt (P)   -AO    Posttreatment Pain Rating 0-->no hurt (P)   -AO       Oral Motor Structure and Function    Dentition Assessment natural, present and adequate (P)   -AO    Secretion Management WNL/WFL (P)   -AO    Mucosal Quality moist, healthy (P)   -AO       Oral Musculature and Cranial Nerve Assessment    Oral Motor General Assessment WFL (P)   -AO       General Eating/Swallowing Observations    Eating/Swallowing Skills self-fed;fed by SLP (P)   -AO    Positioning During Eating upright in chair (P)   -AO       MBS/VFSS    Utensils Used spoon;cup (P)   -AO    Consistencies Trialed regular textures;soft to chew textures;chopped;mixed consistency;pureed;thin liquids;nectar/syrup-thick liquids;honey-thick liquids (P)   -AO       MBS/VFSS Interpretation    VFSS Summary VFSS completed, radiologist Dr. Brand  present. Noted some calsification and anterior buldging just below the cricopharyngeus (CP) at C5-7 at baseline. Silent penetration/aspiration with thins by cup. No pen/asp with cued small sip of thin. Transient penetration with NTL by cup. No pen/asp with HTL, puree, mechanical soft chopped, mixed, and regular solids. Mild residue in vallecula with puree, mechanical soft chopped, and mixed. Moderate residue in vallecula with regular solids. Nectar thick liquid wash cleared some, but not all residue. Recommend NTL and mechanical soft chopped, water protocol between meals after oral care, meds crushed with puree. Reviewed results of evaluation with patient and spouse. Discussed instances of penetration and aspiration, and risk of pneumonia. Educated patient and spouse about swallow precautions including small sips of thin, alternating between small bites and sips, adequate mastication, and throat clear followed by a second swallow after liquid sips. Provided patient and spouse with handouts detailing water protocol and swallow precautions. Patient and spouse verbalized understanding. (P)   -AO       SLP Communication to Radiology    Summary Statement VFSS completed, radiologist Dr. Brand present. Silent penetration/aspiration with thins by cup. No pen/asp with small sip of thin. Transient penetration with NTL. No pen/asp with HTL, puree, mechanical soft chopped, mixed, and regular solids. Mild residue with puree and mechanical soft, moderate residue with regular solids. (P)   -AO       SLP Evaluation Clinical Impression    SLP Swallowing Diagnosis moderate;oral dysphagia;pharyngeal dysphagia (P)   -AO    Functional Impact risk of aspiration/pneumonia (P)   -AO    Rehab Potential/Prognosis, Swallowing good, to achieve stated therapy goals (P)   -AO    Swallow Criteria for Skilled Therapeutic Interventions Met demonstrates skilled criteria (P)   -AO       Recommendations    Recommended Precautions and Strategies  upright posture during/after eating;small bites of food and sips of liquid;alternate between small bites of food and sips of liquid;volitional throat clear (P)   -AO    Oral Care Recommendations Oral Care BID/PRN;Before ice/water (P)   -AO    SLP Rec. for Method of Medication Administration meds crushed;with puree (P)   -AO    Monitor for Signs of Aspiration yes;notify SLP if any concerns (P)   -AO    Anticipated Discharge Disposition (SLP) home (P)   -AO              User Key  (r) = Recorded By, (t) = Taken By, (c) = Cosigned By      Initials Name Provider Type    AO Chip Trevino Speech Therapy Student SLP Student                                   OP SLP Education       Row Name 03/24/23 2327       Education    Barriers to Learning No barriers identified (P)   -AO    Education Provided Described results of evaluation;Patient expressed understanding of evaluation;Family/caregivers expressed understanding of evaluation;Patient participated in establishing goals and treatment plan;Family/caregivers participated in establishing goals and treatment plan (P)   -AO    Assessed Learning needs;Learning motivation (P)   -AO    Learning Motivation Weak (P)   -AO    Learning Method Explanation;Demonstration (P)   -AO    Teaching Response Verbalized understanding (P)   -AO    Education Comments Patient and spouse reported patient does not follow thicken liquids recommendation. Spouse expressed patient is unlikely to follow diet recommendation and swallow precautions when unsupervised. (P)   -AO              User Key  (r) = Recorded By, (t) = Taken By, (c) = Cosigned By      Initials Name Effective Dates    AO Chip Trevino Speech Therapy Student 01/12/23 -                              Time Calculation:   SLP Start Time: (P) 1300  Untimed Charges  28223-ZT Motion Fluoro Eval Swallow Minutes: (P) 90  Total Minutes  Untimed Charges Total Minutes: (P) 90   Total Minutes: (P) 90    Therapy Charges for Today       Code Description Service  Date Service Provider Modifiers Qty    [id number]  ST MOTION FLUORO EVAL SWALLOW 6 3/24/2023 Chip Trevino, Speech Therapy Student GN 1                     Chip Trevino, Speech Therapy Student  3/24/2023

## 2023-03-24 NOTE — PROGRESS NOTES
Anticoagulation Clinic Progress Note    Anticoagulation Summary  As of 3/24/2023    INR goal:  3.0-3.5   TTR:  70.2 % (4.2 y)   INR used for dosin.80 (3/24/2023)   Warfarin maintenance plan:  5 mg every day; Starting 3/24/2023   Weekly warfarin total:  35 mg   Plan last modified:  Carmen El, PharmD (2023)   Next INR check:  3/31/2023   Priority:  High   Target end date:  Indefinite    Indications    History of aortic valve replacement with metallic valve [Z95.4]  Atrial fibrillation (HCC) [I48.91]  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]             Anticoagulation Episode Summary     INR check location:      Preferred lab:      Send INR reminders to:  [facility]    Comments:  **Home testing training 3/3/23** *CALL EVERY TIME* New INR goal 3 - 3.5 (see 2020 hospitalization for CVA)      Anticoagulation Care Providers     Provider Role Specialty Phone number    Griffin Fried MD Referring Cardiology [phone]          Clinic Interview:  Patient Findings     Positives:  Change in diet/appetite    Negatives:  Signs/symptoms of thrombosis, Signs/symptoms of bleeding,   Laboratory test error suspected, Change in health, Change in alcohol use,   Change in activity, Upcoming invasive procedure, Emergency department   visit, Upcoming dental procedure, Missed doses, Extra doses, Change in   medications, Hospital admission, Bruising, Other complaints    Comments:  Reports more vit k in diet than usual (cabbage x 3 days);   plans to resume usual lower intake.      Clinical Outcomes     Negatives:  Major bleeding event, Thromboembolic event,   Anticoagulation-related hospital admission, Anticoagulation-related ED   visit, Anticoagulation-related fatality    Comments:  Reports more vit k in diet than usual (cabbage x 3 days);   plans to resume usual lower intake.        INR History:  Anticoagulation Monitoring 3/7/2023 3/17/2023  3/24/2023   INR 3.20 3.60 2.80   INR Date 3/7/2023 3/17/2023 3/24/2023   INR Goal 3.0-3.5 3.0-3.5 3.0-3.5   Trend Same Same Same   Last Week Total 35 mg 35 mg 35 mg   Next Week Total 35 mg 35 mg 37.5 mg   Sun 5 mg 5 mg 5 mg   Mon 5 mg 5 mg 5 mg   Tue 5 mg 5 mg 5 mg   Wed 5 mg 5 mg 5 mg   Thu 5 mg 5 mg 5 mg   Fri 5 mg 5 mg 7.5 mg (3/24)   Sat 5 mg 5 mg 5 mg   Visit Report - - -   Some recent data might be hidden       Plan:  1. INR is Subtherapeutic today- see above in Anticoagulation Summary.   Will instruct LAURA Sequeira to Change their warfarin regimen (7.5 mg today, then resume 5 mg daily) - see above in Anticoagulation Summary.  2. Follow up in 1 week.  3. They have been instructed to call if any changes in medications, doses, concerns, etc. Patient expresses understanding and has no further questions at this time.    Vasquez Niño, PharmD

## 2023-03-28 ENCOUNTER — TELEPHONE (OUTPATIENT)
Dept: INTERNAL MEDICINE | Facility: CLINIC | Age: 88
End: 2023-03-28

## 2023-03-28 NOTE — TELEPHONE ENCOUNTER
"  Caller: LAURA Sequeira \"Betito\"    Relationship: Self    Best call back number: [phone]     Requested Prescriptions:    ferrous gluconate (FERGON) 324 MG tablet    Pharmacy where request should be sent:      [facility] [id number] - [place], [place] - [address] [facility] [phone]  - [phone] FX          Last office visit with prescribing clinician: 3/10/2023   Last telemedicine visit with prescribing clinician: 6/16/2023   Next office visit with prescribing clinician: 6/16/2023     Additional details provided by patient:     WILL NEED IT BY Sunday    IF THERE IS A PROBLEM WITH THE REFILL PLEASE CALL PATIENT AND MAKE THEM AWARE.      Does the patient have less than a 3 day supply:  [] Yes  [x] No    Would you like a call back once the refill request has been completed: [] Yes [] No    If the office needs to give you a call back, can they leave a voicemail: [] Yes [] No    Felicia Burgos Rep   03/28/23 13:26 EDT           "

## 2023-03-31 ENCOUNTER — ANTICOAGULATION VISIT (OUTPATIENT)
Dept: PHARMACY | Facility: HOSPITAL | Age: 88
End: 2023-03-31
Payer: MEDICARE

## 2023-03-31 DIAGNOSIS — I63.411 CEREBROVASCULAR ACCIDENT (CVA) DUE TO EMBOLISM OF RIGHT MIDDLE CEREBRAL ARTERY: ICD-10-CM

## 2023-03-31 DIAGNOSIS — Z95.4 HISTORY OF AORTIC VALVE REPLACEMENT WITH METALLIC VALVE: Primary | ICD-10-CM

## 2023-03-31 LAB — INR PPP: 3.8

## 2023-03-31 PROCEDURE — G0249 PROVIDE INR TEST MATER/EQUIP: HCPCS

## 2023-03-31 NOTE — PROGRESS NOTES
Anticoagulation Clinic Progress Note    Anticoagulation Summary  As of 3/31/2023    INR goal:  3.0-3.5   TTR:  70.1 % (4.2 y)   INR used for dosing:  3.80 (3/31/2023)   Warfarin maintenance plan:  5 mg every day; Starting 3/31/2023   Weekly warfarin total:  35 mg   No change documented:  Vasquez Niño, PharmD   Plan last modified:  Carmen El PharmD (2/24/2023)   Next INR check:  4/7/2023   Priority:  High   Target end date:  Indefinite    Indications    History of aortic valve replacement with metallic valve [Z95.4]  Atrial fibrillation (HCC) [I48.91]  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]             Anticoagulation Episode Summary     INR check location:      Preferred lab:      Send INR reminders to:  [facility]    Comments:  **Home testing training 3/3/23** *CALL EVERY TIME* New INR goal 3 - 3.5 (see 1/2020 hospitalization for CVA)      Anticoagulation Care Providers     Provider Role Specialty Phone number    Griffin Fried MD Referring Cardiology [phone]          Clinic Interview:  Patient Findings     Negatives:  Signs/symptoms of thrombosis, Signs/symptoms of bleeding,   Laboratory test error suspected, Change in health, Change in alcohol use,   Change in activity, Upcoming invasive procedure, Emergency department   visit, Upcoming dental procedure, Missed doses, Extra doses, Change in   medications, Change in diet/appetite, Hospital admission, Bruising, Other   complaints      Clinical Outcomes     Negatives:  Major bleeding event, Thromboembolic event,   Anticoagulation-related hospital admission, Anticoagulation-related ED   visit, Anticoagulation-related fatality        INR History:  Anticoagulation Monitoring 3/17/2023 3/24/2023 3/31/2023   INR 3.60 2.80 3.80   INR Date 3/17/2023 3/24/2023 3/31/2023   INR Goal 3.0-3.5 3.0-3.5 3.0-3.5   Trend Same Same Same   Last Week Total 35 mg 35 mg 37.5 mg   Next Week Total  35 mg 37.5 mg 35 mg   Sun 5 mg 5 mg 5 mg   Mon 5 mg 5 mg 5 mg   Tue 5 mg 5 mg 5 mg   Wed 5 mg 5 mg 5 mg   Thu 5 mg 5 mg 5 mg   Fri 5 mg 7.5 mg (3/24) 5 mg   Sat 5 mg 5 mg 5 mg   Visit Report - - -   Some recent data might be hidden       Plan:  1. INR is Supratherapeutic today- see above in Anticoagulation Summary.   Will instruct LAURA Sequeira to Continue their warfarin regimen of 5 mg daily - see above in Anticoagulation Summary.  2. Follow up in 1 week.  3. They have been instructed to call if any changes in medications, doses, concerns, etc. Patient expresses understanding and has no further questions at this time.    Vasquez Niño, PharmD

## 2023-04-07 ENCOUNTER — ANTICOAGULATION VISIT (OUTPATIENT)
Dept: PHARMACY | Facility: HOSPITAL | Age: 88
End: 2023-04-07
Payer: MEDICARE

## 2023-04-07 DIAGNOSIS — Z95.4 HISTORY OF AORTIC VALVE REPLACEMENT WITH METALLIC VALVE: Primary | ICD-10-CM

## 2023-04-07 DIAGNOSIS — I63.411 CEREBROVASCULAR ACCIDENT (CVA) DUE TO EMBOLISM OF RIGHT MIDDLE CEREBRAL ARTERY: ICD-10-CM

## 2023-04-07 LAB — INR PPP: 3.7

## 2023-04-07 NOTE — PROGRESS NOTES
Anticoagulation Clinic Progress Note    Anticoagulation Summary  As of 4/7/2023    INR goal:  3.0-3.5   TTR:  69.8 % (4.2 y)   INR used for dosing:  3.70 (4/7/2023)   Warfarin maintenance plan:  5 mg every day; Starting 4/7/2023   Weekly warfarin total:  35 mg   No change documented:  Michelle Piña RPH   Plan last modified:  Carmen El, PharmD (2/24/2023)   Next INR check:  4/14/2023   Priority:  High   Target end date:  Indefinite    Indications    History of aortic valve replacement with metallic valve [Z95.4]  Atrial fibrillation [I48.91]  Cerebrovascular accident (CVA) due to 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]             Anticoagulation Episode Summary     INR check location:      Preferred lab:      Send INR reminders to:  [facility]    Comments:  **Home testing training 3/3/23** *CALL EVERY TIME* New INR goal 3 - 3.5 (see 1/2020 hospitalization for CVA)      Anticoagulation Care Providers     Provider Role Specialty Phone number    Griffin Fried MD Referring Cardiology [phone]          Clinic Interview:  Patient Findings     Negatives:  Signs/symptoms of thrombosis, Signs/symptoms of bleeding,   Laboratory test error suspected, Change in health, Change in alcohol use,   Change in activity, Upcoming invasive procedure, Emergency department   visit, Upcoming dental procedure, Missed doses, Extra doses, Change in   medications, Change in diet/appetite, Hospital admission, Bruising, Other   complaints      Clinical Outcomes     Negatives:  Major bleeding event, Thromboembolic event,   Anticoagulation-related hospital admission, Anticoagulation-related ED   visit, Anticoagulation-related fatality        INR History:  Anticoagulation Monitoring 3/24/2023 3/31/2023 4/7/2023   INR 2.80 3.80 3.70   INR Date 3/24/2023 3/31/2023 4/7/2023   INR Goal 3.0-3.5 3.0-3.5 3.0-3.5   Trend Same Same Same   Last Week Total 35 mg 37.5 mg 35 mg   Next Week Total 37.5 mg 35 mg 35  mg   Sun 5 mg 5 mg 5 mg   Mon 5 mg 5 mg 5 mg   Tue 5 mg 5 mg 5 mg   Wed 5 mg 5 mg 5 mg   Thu 5 mg 5 mg 5 mg   Fri 7.5 mg (3/24) 5 mg 5 mg   Sat 5 mg 5 mg 5 mg   Visit Report - - -   Some recent data might be hidden       Plan:  1. INR is Supratherapeutic today- see above in Anticoagulation Summary.   Will instruct LAURA Sequeira to Continue their warfarin regimen- see above in Anticoagulation Summary.  Hesitant to partial because it might drop too low. Continue 5 mg daily and see if it will level out.   2. Follow up in 1 weeks  3. Pt has agreed to only be called if INR out of range. They have been instructed to call if any changes in medications, doses, concerns, etc. Patient expresses understanding and has no further questions at this time.    Michelle Piña formerly [facility]

## 2023-04-14 ENCOUNTER — ANTICOAGULATION VISIT (OUTPATIENT)
Dept: PHARMACY | Facility: HOSPITAL | Age: 88
End: 2023-04-14
Payer: MEDICARE

## 2023-04-14 DIAGNOSIS — I63.411 CEREBROVASCULAR ACCIDENT (CVA) DUE TO EMBOLISM OF RIGHT MIDDLE CEREBRAL ARTERY: ICD-10-CM

## 2023-04-14 DIAGNOSIS — Z95.4 HISTORY OF AORTIC VALVE REPLACEMENT WITH METALLIC VALVE: Primary | ICD-10-CM

## 2023-04-14 LAB — INR PPP: 3

## 2023-04-14 NOTE — PROGRESS NOTES
Anticoagulation Clinic Progress Note    Anticoagulation Summary  As of 4/14/2023    INR goal:  3.0-3.5   TTR:  69.9 % (4.2 y)   INR used for dosing:  3.00 (4/14/2023)   Warfarin maintenance plan:  5 mg every day; Starting 4/14/2023   Weekly warfarin total:  35 mg   No change documented:  Carmen El PharmD   Plan last modified:  Carmen El PharmD (2/24/2023)   Next INR check:  4/21/2023   Priority:  High   Target end date:  Indefinite    Indications    History of aortic valve replacement with metallic valve [Z95.4]  Atrial fibrillation [I48.91]  Cerebrovascular accident (CVA) due to 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]             Anticoagulation Episode Summary     INR check location:      Preferred lab:      Send INR reminders to:  [facility]    Comments:  **Home testing training 3/3/23** *CALL EVERY TIME* New INR goal 3 - 3.5 (see 1/2020 hospitalization for CVA)      Anticoagulation Care Providers     Provider Role Specialty Phone number    Griffin Fried MD Referring Cardiology [phone]          Clinic Interview:  Patient Findings     Negatives:  Signs/symptoms of thrombosis, Signs/symptoms of bleeding,   Laboratory test error suspected, Change in health, Change in alcohol use,   Change in activity, Upcoming invasive procedure, Emergency department   visit, Upcoming dental procedure, Missed doses, Extra doses, Change in   medications, Change in diet/appetite, Hospital admission, Bruising, Other   complaints      Clinical Outcomes     Negatives:  Major bleeding event, Thromboembolic event,   Anticoagulation-related hospital admission, Anticoagulation-related ED   visit, Anticoagulation-related fatality        INR History:      3/24/2023     8:20 AM 3/31/2023    12:00 AM 3/31/2023     8:45 AM 4/7/2023    12:00 AM 4/7/2023     9:45 AM 4/14/2023    12:00 AM 4/14/2023     8:20 AM   Anticoagulation Monitoring   INR 2.80  3.80  3.70  3.00   INR Date 3/24/2023   3/31/2023  4/7/2023  4/14/2023   INR Goal 3.0-3.5  3.0-3.5  3.0-3.5  3.0-3.5   Trend Same  Same  Same  Same   Last Week Total 35 mg  37.5 mg  35 mg  35 mg   Next Week Total 37.5 mg  35 mg  35 mg  35 mg   Sun 5 mg  5 mg  5 mg  5 mg   Mon 5 mg  5 mg  5 mg  5 mg   Tue 5 mg  5 mg  5 mg  5 mg   Wed 5 mg  5 mg  5 mg  5 mg   Thu 5 mg  5 mg  5 mg  5 mg   Fri 7.5 mg (3/24)  5 mg  5 mg  5 mg   Sat 5 mg  5 mg  5 mg  5 mg   Historical INR  3.80       3.70       3.00             This result is from an external source.       Plan:  1. INR is Therapeutic today- see above in Anticoagulation Summary.   Will instruct LAURA Sequeira to Continue their warfarin regimen- see above in Anticoagulation Summary.  2. Follow up in 1 weeks  3. They have been instructed to call if any changes in medications, doses, concerns, etc. Patient expresses understanding and has no further questions at this time.    Carmen El, PharmD

## 2023-04-19 DIAGNOSIS — Z79.4 TYPE 2 DIABETES MELLITUS WITH STAGE 3B CHRONIC KIDNEY DISEASE, WITH LONG-TERM CURRENT USE OF INSULIN: ICD-10-CM

## 2023-04-19 DIAGNOSIS — N18.32 TYPE 2 DIABETES MELLITUS WITH STAGE 3B CHRONIC KIDNEY DISEASE, WITH LONG-TERM CURRENT USE OF INSULIN: ICD-10-CM

## 2023-04-19 DIAGNOSIS — E11.22 TYPE 2 DIABETES MELLITUS WITH STAGE 3B CHRONIC KIDNEY DISEASE, WITH LONG-TERM CURRENT USE OF INSULIN: ICD-10-CM

## 2023-04-19 RX ORDER — SUB-Q INSULIN DEVICE, 40 UNIT
EACH MISCELLANEOUS
Qty: 30 EACH | Refills: 2 | Status: SHIPPED | OUTPATIENT
Start: 2023-04-19

## 2023-04-21 ENCOUNTER — ANTICOAGULATION VISIT (OUTPATIENT)
Dept: PHARMACY | Facility: HOSPITAL | Age: 88
End: 2023-04-21
Payer: MEDICARE

## 2023-04-21 DIAGNOSIS — Z95.4 HISTORY OF AORTIC VALVE REPLACEMENT WITH METALLIC VALVE: Primary | ICD-10-CM

## 2023-04-21 DIAGNOSIS — I63.411 CEREBROVASCULAR ACCIDENT (CVA) DUE TO EMBOLISM OF RIGHT MIDDLE CEREBRAL ARTERY: ICD-10-CM

## 2023-04-21 LAB — INR PPP: 3.6

## 2023-04-21 NOTE — PROGRESS NOTES
Anticoagulation Clinic Progress Note    Anticoagulation Summary  As of 4/21/2023    INR goal:  3.0-3.5   TTR:  69.8 % (4.3 y)   INR used for dosing:  3.60 (4/21/2023)   Warfarin maintenance plan:  5 mg every day; Starting 4/21/2023   Weekly warfarin total:  35 mg   No change documented:  Carmen El PharmD   Plan last modified:  Carmen El PharmD (2/24/2023)   Next INR check:  4/28/2023   Priority:  High   Target end date:  Indefinite    Indications    History of aortic valve replacement with metallic valve [Z95.4]  Atrial fibrillation [I48.91]  Cerebrovascular accident (CVA) due to 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]             Anticoagulation Episode Summary     INR check location:      Preferred lab:      Send INR reminders to:  [facility]    Comments:  **Home testing training 3/3/23** *CALL EVERY TIME* New INR goal 3 - 3.5 (see 1/2020 hospitalization for CVA)      Anticoagulation Care Providers     Provider Role Specialty Phone number    Griffin Fried MD Referring Cardiology [phone]          Clinic Interview:  Patient Findings     Positives:  Change in alcohol use    Negatives:  Signs/symptoms of thrombosis, Signs/symptoms of bleeding,   Laboratory test error suspected, Change in health, Change in activity,   Upcoming invasive procedure, Emergency department visit, Upcoming dental   procedure, Missed doses, Extra doses, Change in medications, Change in   diet/appetite, Hospital admission, Bruising, Other complaints    Comments:  Had a bourbon and water last night      Clinical Outcomes     Negatives:  Major bleeding event, Thromboembolic event,   Anticoagulation-related hospital admission, Anticoagulation-related ED   visit, Anticoagulation-related fatality    Comments:  Had a bourbon and water last night        INR History:      3/31/2023     8:45 AM 4/7/2023    12:00 AM 4/7/2023     9:45 AM 4/14/2023    12:00 AM 4/14/2023     8:20 AM 4/21/2023    12:00  AM 4/21/2023     8:30 AM   Anticoagulation Monitoring   INR 3.80  3.70  3.00  3.60   INR Date 3/31/2023  4/7/2023  4/14/2023  4/21/2023   INR Goal 3.0-3.5  3.0-3.5  3.0-3.5  3.0-3.5   Trend Same  Same  Same  Same   Last Week Total 37.5 mg  35 mg  35 mg  35 mg   Next Week Total 35 mg  35 mg  35 mg  35 mg   Sun 5 mg  5 mg  5 mg  5 mg   Mon 5 mg  5 mg  5 mg  5 mg   Tue 5 mg  5 mg  5 mg  5 mg   Wed 5 mg  5 mg  5 mg  5 mg   Thu 5 mg  5 mg  5 mg  5 mg   Fri 5 mg  5 mg  5 mg  5 mg   Sat 5 mg  5 mg  5 mg  5 mg   Historical INR  3.70       3.00       3.60             This result is from an external source.       Plan:  1. INR is Supratherapeutic today- see above in Anticoagulation Summary.   Will instruct LAURA Sequeira to Continue their warfarin regimen- see above in Anticoagulation Summary.  2. Follow up in 1 weeks  3. Spoke to patient's wife, Gladys. They have been instructed to call if any changes in medications, doses, concerns, etc. Patient expresses understanding and has no further questions at this time.    Carmen El, PharmD

## 2023-04-28 ENCOUNTER — ANTICOAGULATION VISIT (OUTPATIENT)
Dept: PHARMACY | Facility: HOSPITAL | Age: 88
End: 2023-04-28
Payer: MEDICARE

## 2023-04-28 DIAGNOSIS — Z95.4 HISTORY OF AORTIC VALVE REPLACEMENT WITH METALLIC VALVE: Primary | ICD-10-CM

## 2023-04-28 DIAGNOSIS — I63.411 CEREBROVASCULAR ACCIDENT (CVA) DUE TO EMBOLISM OF RIGHT MIDDLE CEREBRAL ARTERY: ICD-10-CM

## 2023-04-28 LAB — INR PPP: 3.8

## 2023-04-28 PROCEDURE — G0249 PROVIDE INR TEST MATER/EQUIP: HCPCS

## 2023-04-28 NOTE — PROGRESS NOTES
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]             Anticoagulation Episode Summary     INR check location:      Preferred lab:      Send INR reminders to:  [facility]    Comments:  **Home testing training 3/3/23** *CALL EVERY TIME* New INR goal 3 - 3.5 (see 1/2020 hospitalization for CVA)      Anticoagulation Care Providers     Provider Role Specialty Phone number    Griffin Fried MD Referring Cardiology [phone]          Clinic Interview:  Patient Findings     Negatives:  Signs/symptoms of thrombosis, Signs/symptoms of bleeding,   Laboratory test error suspected, Change in health, Change in alcohol use,   Change in activity, Upcoming invasive procedure, Emergency department   visit, Upcoming dental procedure, Missed doses, Extra doses, Change in   medications, Change in diet/appetite, Hospital admission, Bruising, Other   complaints    Comments:  Had a half glass of wine this week as well as a half of a   bourbon and water last night.      Clinical Outcomes     Negatives:  Major bleeding event, Thromboembolic event,   Anticoagulation-related hospital admission, Anticoagulation-related ED   visit, Anticoagulation-related fatality    Comments:  Had a half glass of wine this week as well as a half of a   bourbon and water last night.        INR History:      4/7/2023     9:45 AM 4/14/2023    12:00 AM 4/14/2023     8:20 AM 4/21/2023     12:00 AM 4/21/2023     8:30 AM 4/28/2023    12:00 AM 4/28/2023     8:00 AM   Anticoagulation Monitoring   INR 3.70  3.00  3.60  3.80   INR Date 4/7/2023 4/14/2023 4/21/2023 4/28/2023   INR Goal 3.0-3.5  3.0-3.5  3.0-3.5  3.0-3.5   Trend Same  Same  Same  Same   Last Week Total 35 mg  35 mg  35 mg  35 mg   Next Week Total 35 mg  35 mg  35 mg  32.5 mg   Sun 5 mg  5 mg  5 mg  5 mg   Mon 5 mg  5 mg  5 mg  5 mg   Tue 5 mg  5 mg  5 mg  5 mg   Wed 5 mg  5 mg  5 mg  5 mg   Thu 5 mg  5 mg  5 mg  5 mg   Fri 5 mg  5 mg  5 mg  2.5 mg (4/28)   Sat 5 mg  5 mg  5 mg  5 mg   Historical INR  3.00       3.60       3.80             This result is from an external source.       Plan:  1. INR is Supratherapeutic today- see above in Anticoagulation Summary.   Will instruct LAURA Sequeira to Change their warfarin regimen- see above in Anticoagulation Summary.  2. Follow up in 1 weeks  3. They have been instructed to call if any changes in medications, doses, concerns, etc. Patient expresses understanding and has no further questions at this time.    Carmen El, PharmD

## 2023-05-05 ENCOUNTER — ANTICOAGULATION VISIT (OUTPATIENT)
Dept: PHARMACY | Facility: HOSPITAL | Age: 88
End: 2023-05-05
Payer: MEDICARE

## 2023-05-05 DIAGNOSIS — Z95.4 HISTORY OF AORTIC VALVE REPLACEMENT WITH METALLIC VALVE: Primary | ICD-10-CM

## 2023-05-05 DIAGNOSIS — I63.411 CEREBROVASCULAR ACCIDENT (CVA) DUE TO EMBOLISM OF RIGHT MIDDLE CEREBRAL ARTERY: ICD-10-CM

## 2023-05-05 LAB — INR PPP: 3

## 2023-05-05 NOTE — PROGRESS NOTES
Anticoagulation Clinic Progress Note    Anticoagulation Summary  As of 5/5/2023    INR goal:  3.0-3.5   TTR:  69.5 % (4.3 y)   INR used for dosing:  3.00 (5/5/2023)   Warfarin maintenance plan:  5 mg every day; Starting 5/5/2023   Weekly warfarin total:  35 mg   No change documented:  Michelle Piña, ROSALINDA   Plan last modified:  Carmen El, PharmD (2/24/2023)   Next INR check:  5/12/2023   Priority:  High   Target end date:  Indefinite    Indications    History of aortic valve replacement with metallic valve [Z95.4]  Atrial fibrillation [I48.91]  Cerebrovascular accident (CVA) due to 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    INR History:      4/14/2023     8:20 AM 4/21/2023    12:00 AM 4/21/2023     8:30 AM 4/28/2023    12:00 AM 4/28/2023     8:00 AM 5/5/2023    12:00 AM 5/5/2023     9:10 AM   Anticoagulation Monitoring   INR 3.00  3.60  3.80  3.00   INR Date 4/14/2023 4/21/2023 4/28/2023 5/5/2023   INR Goal 3.0-3.5  3.0-3.5  3.0-3.5  3.0-3.5   Trend Same  Same  Same  Same   Last Week Total 35 mg  35 mg  35 mg  32.5 mg   Next Week Total 35 mg  35 mg  32.5 mg  35 mg   Sun 5 mg  5 mg  5 mg  5 mg   Mon 5 mg  5 mg  5 mg  5 mg   Tue 5 mg  5 mg  5 mg  5 mg   Wed 5 mg  5 mg  5 mg  5 mg   Thu 5 mg  5 mg  5 mg  5 mg   Fri 5 mg  5 mg  2.5 mg (4/28)  5 mg   Sat 5 mg  5 mg  5 mg  5 mg   Historical INR  3.60       3.80       3.00             This result is from an external source.       Plan:  1. INR is therapeutic today- see above  in Anticoagulation Summary.    LAURA Sequeira to continue their warfarin regimen- see above in Anticoagulation Summary.  2. Follow up in 1 week  3. They have been instructed to call if any changes in medications, doses, concerns, etc. Patient expresses understanding and has no further questions at this time.    Michelle Piña, [facility]

## 2023-05-12 ENCOUNTER — ANTICOAGULATION VISIT (OUTPATIENT)
Dept: PHARMACY | Facility: HOSPITAL | Age: 88
End: 2023-05-12
Payer: MEDICARE

## 2023-05-12 DIAGNOSIS — Z95.4 HISTORY OF AORTIC VALVE REPLACEMENT WITH METALLIC VALVE: Primary | ICD-10-CM

## 2023-05-12 DIAGNOSIS — I63.411 CEREBROVASCULAR ACCIDENT (CVA) DUE TO EMBOLISM OF RIGHT MIDDLE CEREBRAL ARTERY: ICD-10-CM

## 2023-05-12 DIAGNOSIS — I48.21 PERMANENT ATRIAL FIBRILLATION: ICD-10-CM

## 2023-05-12 LAB — INR PPP: 3.1

## 2023-05-12 NOTE — PROGRESS NOTES
Anticoagulation Clinic Progress Note    Anticoagulation Summary  As of 5/12/2023    INR goal:  3.0-3.5   TTR:  69.6 % (4.3 y)   INR used for dosing:  3.10 (5/12/2023)   Warfarin maintenance plan:  5 mg every day; Starting 5/12/2023   Weekly warfarin total:  35 mg   No change documented:  Carmen El PharmD   Plan last modified:  Carmen El PharmD (2/24/2023)   Next INR check:  5/19/2023   Priority:  High   Target end date:  Indefinite    Indications    History of aortic valve replacement with metallic valve [Z95.4]  Atrial fibrillation [I48.91]  Cerebrovascular accident (CVA) due to 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]             Anticoagulation Episode Summary     INR check location:      Preferred lab:      Send INR reminders to:  [facility]    Comments:  **Home testing training 3/3/23** *CALL EVERY TIME* New INR goal 3 - 3.5 (see 1/2020 hospitalization for CVA)      Anticoagulation Care Providers     Provider Role Specialty Phone number    Griffin Fried MD Referring Cardiology [phone]          Clinic Interview:  Patient Findings     Negatives:  Signs/symptoms of thrombosis, Signs/symptoms of bleeding,   Laboratory test error suspected, Change in health, Change in alcohol use,   Change in activity, Upcoming invasive procedure, Emergency department   visit, Upcoming dental procedure, Missed doses, Extra doses, Change in   medications, Change in diet/appetite, Hospital admission, Bruising, Other   complaints      Clinical Outcomes     Negatives:  Major bleeding event, Thromboembolic event,   Anticoagulation-related hospital admission, Anticoagulation-related ED   visit, Anticoagulation-related fatality        INR History:      4/21/2023     8:30 AM 4/28/2023    12:00 AM 4/28/2023     8:00 AM 5/5/2023    12:00 AM 5/5/2023     9:10 AM 5/12/2023    12:00 AM 5/12/2023     8:02 AM   Anticoagulation Monitoring   INR 3.60  3.80  3.00  3.10   INR Date 4/21/2023   4/28/2023 5/5/2023 5/12/2023   INR Goal 3.0-3.5  3.0-3.5  3.0-3.5  3.0-3.5   Trend Same  Same  Same  Same   Last Week Total 35 mg  35 mg  32.5 mg  35 mg   Next Week Total 35 mg  32.5 mg  35 mg  35 mg   Sun 5 mg  5 mg  5 mg  5 mg   Mon 5 mg  5 mg  5 mg  5 mg   Tue 5 mg  5 mg  5 mg  5 mg   Wed 5 mg  5 mg  5 mg  5 mg   Thu 5 mg  5 mg  5 mg  5 mg   Fri 5 mg  2.5 mg (4/28)  5 mg  5 mg   Sat 5 mg  5 mg  5 mg  5 mg   Historical INR  3.80       3.00       3.10             This result is from an external source.       Plan:  1. INR is Therapeutic today- see above in Anticoagulation Summary.   Will instruct LAURA Sequeira to Continue their warfarin regimen- see above in Anticoagulation Summary.  2. Follow up in 1 weeks  3. Spoke to patient's wife, Gladys. They have been instructed to call if any changes in medications, doses, concerns, etc. Patient expresses understanding and has no further questions at this time.    Carmen El, PharmD

## 2023-05-15 RX ORDER — METOPROLOL SUCCINATE 25 MG/1
TABLET, EXTENDED RELEASE ORAL
Qty: 45 TABLET | Refills: 3 | Status: SHIPPED | OUTPATIENT
Start: 2023-05-15

## 2023-05-19 ENCOUNTER — ANTICOAGULATION VISIT (OUTPATIENT)
Dept: PHARMACY | Facility: HOSPITAL | Age: 88
End: 2023-05-19
Payer: MEDICARE

## 2023-05-19 DIAGNOSIS — I63.411 CEREBROVASCULAR ACCIDENT (CVA) DUE TO EMBOLISM OF RIGHT MIDDLE CEREBRAL ARTERY: ICD-10-CM

## 2023-05-19 DIAGNOSIS — Z95.4 HISTORY OF AORTIC VALVE REPLACEMENT WITH METALLIC VALVE: Primary | ICD-10-CM

## 2023-05-19 LAB — INR PPP: 4.2

## 2023-05-19 NOTE — PROGRESS NOTES
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]             Anticoagulation Episode Summary     INR check location:      Preferred lab:      Send INR reminders to:  [facility]    Comments:  **Home testing training 3/3/23** *CALL EVERY TIME* New INR goal 3 - 3.5 (see 2020 hospitalization for CVA)      Anticoagulation Care Providers     Provider Role Specialty Phone number    Griffin Fried MD Referring Cardiology [phone]          Clinic Interview:  Patient Findings     Negatives:  Signs/symptoms of thrombosis, Signs/symptoms of bleeding,   Laboratory test error suspected, Change in health, Change in alcohol use,   Change in activity, Upcoming invasive procedure, Emergency department   visit, Upcoming dental procedure, Missed doses, Extra doses, Change in   medications, Change in diet/appetite, Hospital admission, Bruising, Other   complaints      Clinical Outcomes     Negatives:  Major bleeding event, Thromboembolic event,   Anticoagulation-related hospital admission, Anticoagulation-related ED   visit, Anticoagulation-related fatality        INR History:      2023     8:00 AM 2023    12:00 AM 2023     9:10 AM 2023    12:00 AM 2023     8:02 AM 2023    12:00 AM 2023     8:29 AM   Anticoagulation Monitoring   INR 3.80  3.00  3.10  4.20   INR Date 2023   INR Goal 3.0-3.5  3.0-3.5   3.0-3.5  3.0-3.5   Trend Same  Same  Same  Same   Last Week Total 35 mg  32.5 mg  35 mg  35 mg   Next Week Total 32.5 mg  35 mg  35 mg  32.5 mg   Sun 5 mg  5 mg  5 mg  5 mg   Mon 5 mg  5 mg  5 mg  5 mg   Tue 5 mg  5 mg  5 mg  5 mg   Wed 5 mg  5 mg  5 mg  5 mg   Thu 5 mg  5 mg  5 mg  5 mg   Fri 2.5 mg (4/28)  5 mg  5 mg  2.5 mg (5/19)   Sat 5 mg  5 mg  5 mg  5 mg   Historical INR  3.00       3.10       4.20             This result is from an external source.       Plan:  1. INR is Supratherapeutic today- see above in Anticoagulation Summary.   Will instruct LAURA Sequeira to Change their warfarin regimen- see above in Anticoagulation Summary.  2. Follow up in 1 weeks  3. They have been instructed to call if any changes in medications, doses, concerns, etc. Patient expresses understanding and has no further questions at this time.    Carmen El, PharmD

## 2023-05-26 ENCOUNTER — ANTICOAGULATION VISIT (OUTPATIENT)
Dept: PHARMACY | Facility: HOSPITAL | Age: 88
End: 2023-05-26
Payer: MEDICARE

## 2023-05-26 DIAGNOSIS — I63.411 CEREBROVASCULAR ACCIDENT (CVA) DUE TO EMBOLISM OF RIGHT MIDDLE CEREBRAL ARTERY: ICD-10-CM

## 2023-05-26 DIAGNOSIS — Z95.4 HISTORY OF AORTIC VALVE REPLACEMENT WITH METALLIC VALVE: Primary | ICD-10-CM

## 2023-05-26 LAB — INR PPP: 2.9

## 2023-05-26 PROCEDURE — G0249 PROVIDE INR TEST MATER/EQUIP: HCPCS

## 2023-05-26 RX ORDER — DIGOXIN 125 MCG
125 TABLET ORAL
Qty: 30 TABLET | Refills: 0 | Status: SHIPPED | OUTPATIENT
Start: 2023-05-26

## 2023-05-26 NOTE — TELEPHONE ENCOUNTER
Caller: Gladys Sequeira    Relationship: Emergency Contact    Best call back number: [phone]    Requested Prescriptions:   Requested Prescriptions     Pending Prescriptions Disp Refills   • digoxin (LANOXIN) 125 MCG tablet       Sig: Take 1 tablet by mouth.        Pharmacy where request should be sent: [facility] [id number] [address] [facility] - [phone]  - [phone] FX     Last office visit with prescribing clinician: 3/10/2023   Last telemedicine visit with prescribing clinician: Visit date not found   Next office visit with prescribing clinician: 6/16/2023     Additional details provided by patient: PATIENT' SPOUSE STATE THE PATIENT HAS NOT HAD THIS MEDICATION FILLED BY DR. RUBIO YET. PATIENT RECEIVED MEDICATION FROM HOSPITAL VISIT.     Does the patient have less than a 3 day supply:  [] Yes  [x] No    Would you like a call back once the refill request has been completed: [] Yes [] No    If the office needs to give you a call back, can they leave a voicemail: [] Yes [] No    Felicia Garsia Rep   05/26/23 09:58 EDT

## 2023-05-26 NOTE — PROGRESS NOTES
Anticoagulation Clinic Progress Note    Anticoagulation Summary  As of 2023    INR goal:  3.0-3.5   TTR:  69.2 % (4.4 y)   INR used for dosin.90 (2023)   Warfarin maintenance plan:  5 mg every day; Starting 2023   Weekly warfarin total:  35 mg   No change documented:  Carmen El PharmD   Plan last modified:  Carmen El PharmD (2023)   Next INR check:  2023   Priority:  High   Target end date:  Indefinite    Indications    History of aortic valve replacement with metallic valve [Z95.4]  Atrial fibrillation [I48.91]  Cerebrovascular accident (CVA) due to 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]             Anticoagulation Episode Summary     INR check location:      Preferred lab:      Send INR reminders to:  [facility]    Comments:  **Home testing training 3/3/23** *CALL EVERY TIME* New INR goal 3 - 3.5 (see 2020 hospitalization for CVA)      Anticoagulation Care Providers     Provider Role Specialty Phone number    Griffin Fried MD Referring Cardiology [phone]          Clinic Interview:  Patient Findings     Positives:  Missed doses    Negatives:  Signs/symptoms of thrombosis, Signs/symptoms of bleeding,   Laboratory test error suspected, Change in health, Change in alcohol use,   Change in activity, Upcoming invasive procedure, Emergency department   visit, Upcoming dental procedure, Extra doses, Change in medications,   Change in diet/appetite, Hospital admission, Bruising, Other complaints    Comments:  [place] reports on Monday she forgot the night meds, gave them   Tuesday morning, but then didn't give warfarin that night.      Clinical Outcomes     Negatives:  Major bleeding event, Thromboembolic event,   Anticoagulation-related hospital admission, Anticoagulation-related ED   visit, Anticoagulation-related fatality    Comments:  [place] reports on Monday she forgot the night meds, gave them   Tuesday morning, but then didn't give  warfarin that night.        INR History:      5/5/2023     9:10 AM 5/12/2023    12:00 AM 5/12/2023     8:02 AM 5/19/2023    12:00 AM 5/19/2023     8:29 AM 5/26/2023    12:00 AM 5/26/2023     8:45 AM   Anticoagulation Monitoring   INR 3.00  3.10  4.20  2.90   INR Date 5/5/2023 5/12/2023 5/19/2023 5/26/2023   INR Goal 3.0-3.5  3.0-3.5  3.0-3.5  3.0-3.5   Trend Same  Same  Same  Same   Last Week Total 32.5 mg  35 mg  35 mg  32.5 mg   Next Week Total 35 mg  35 mg  32.5 mg  35 mg   Sun 5 mg  5 mg  5 mg  5 mg   Mon 5 mg  5 mg  5 mg  5 mg   Tue 5 mg  5 mg  5 mg  5 mg   Wed 5 mg  5 mg  5 mg  5 mg   Thu 5 mg  5 mg  5 mg  5 mg   Fri 5 mg  5 mg  2.5 mg (5/19)  5 mg   Sat 5 mg  5 mg  5 mg  5 mg   Historical INR  3.10       4.20       2.90             This result is from an external source.       Plan:  1. INR is Subtherapeutic today- see above in Anticoagulation Summary.   Will instruct LAURA Sequeira to Continue their warfarin regimen- see above in Anticoagulation Summary.  2. Follow up in 1 weeks  3. They have been instructed to call if any changes in medications, doses, concerns, etc. Patient expresses understanding and has no further questions at this time.    Carmen El, PharmD

## 2023-06-02 ENCOUNTER — ANTICOAGULATION VISIT (OUTPATIENT)
Dept: PHARMACY | Facility: HOSPITAL | Age: 88
End: 2023-06-02

## 2023-06-02 DIAGNOSIS — Z95.4 HISTORY OF AORTIC VALVE REPLACEMENT WITH METALLIC VALVE: Primary | ICD-10-CM

## 2023-06-02 DIAGNOSIS — I63.411 CEREBROVASCULAR ACCIDENT (CVA) DUE TO EMBOLISM OF RIGHT MIDDLE CEREBRAL ARTERY: ICD-10-CM

## 2023-06-02 LAB — INR PPP: 4.1

## 2023-06-02 NOTE — PROGRESS NOTES
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]             Anticoagulation Episode Summary     INR check location:      Preferred lab:      Send INR reminders to:  [facility]    Comments:  **Home testing training 3/3/23** *CALL EVERY TIME* New INR goal 3 - 3.5 (see 2020 hospitalization for CVA)      Anticoagulation Care Providers     Provider Role Specialty Phone number    Griffin Fried MD Referring Cardiology [phone]          Clinic Interview:  Patient Findings     Negatives:  Signs/symptoms of thrombosis, Signs/symptoms of bleeding,   Laboratory test error suspected, Change in health, Change in alcohol use,   Change in activity, Upcoming invasive procedure, Emergency department   visit, Upcoming dental procedure, Missed doses, Extra doses, Change in   medications, Change in diet/appetite, Hospital admission, Bruising, Other   complaints      Clinical Outcomes     Negatives:  Major bleeding event, Thromboembolic event,   Anticoagulation-related hospital admission, Anticoagulation-related ED   visit, Anticoagulation-related fatality        INR History:      2023     8:02 AM 2023    12:00 AM 2023     8:29 AM 2023    12:00 AM 2023     8:45 AM 2023    12:00 AM 2023     9:13 AM   Anticoagulation Monitoring   INR 3.10  4.20  2.90  4.10   INR Date 2023   INR Goal 3.0-3.5  3.0-3.5   3.0-3.5  3.0-3.5   Trend Same  Same  Same  Same   Last Week Total 35 mg  35 mg  32.5 mg  35 mg   Next Week Total 35 mg  32.5 mg  35 mg  32.5 mg   Sun 5 mg  5 mg  5 mg  5 mg   Mon 5 mg  5 mg  5 mg  5 mg   Tue 5 mg  5 mg  5 mg  5 mg   Wed 5 mg  5 mg  5 mg  5 mg   Thu 5 mg  5 mg  5 mg  5 mg   Fri 5 mg  2.5 mg (5/19)  5 mg  2.5 mg (6/2)   Sat 5 mg  5 mg  5 mg  5 mg   Historical INR  4.20       2.90       4.10             This result is from an external source.       Plan:  1. INR is Supratherapeutic today- see above in Anticoagulation Summary.   Will instruct LAURA Sequeira to Change their warfarin regimen- see above in Anticoagulation Summary.  2. Follow up in 1 weeks  3.  They have been instructed to call if any changes in medications, doses, concerns, etc. Patient expresses understanding and has no further questions at this time.    Carmen El, PharmD

## 2023-06-09 ENCOUNTER — ANTICOAGULATION VISIT (OUTPATIENT)
Dept: PHARMACY | Facility: HOSPITAL | Age: 88
End: 2023-06-09
Payer: MEDICARE

## 2023-06-09 DIAGNOSIS — Z95.4 HISTORY OF AORTIC VALVE REPLACEMENT WITH METALLIC VALVE: Primary | ICD-10-CM

## 2023-06-09 DIAGNOSIS — I63.411 CEREBROVASCULAR ACCIDENT (CVA) DUE TO EMBOLISM OF RIGHT MIDDLE CEREBRAL ARTERY: ICD-10-CM

## 2023-06-09 LAB — INR PPP: 3.8

## 2023-06-09 NOTE — PROGRESS NOTES
Anticoagulation Clinic Progress Note    Anticoagulation Summary  As of 6/9/2023      INR goal:  3.0-3.5   TTR:  68.7 % (4.4 y)   INR used for dosing:  3.80 (6/9/2023)   Warfarin maintenance plan:  5 mg every day; Starting 6/9/2023   Weekly warfarin total:  35 mg   No change documented:  Carmen El PharmD   Plan last modified:  Carmen El PharmD (2/24/2023)   Next INR check:  6/16/2023   Priority:  High   Target end date:  Indefinite    Indications    History of aortic valve replacement with metallic valve [Z95.4]  Atrial fibrillation [I48.91]  Cerebrovascular accident (CVA) due to 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]                 Anticoagulation Episode Summary       INR check location:      Preferred lab:      Send INR reminders to:  [facility] TEST PO[facility]    Comments:  **Home testing training 3/3/23** *CALL EVERY TIME* New INR goal 3 - 3.5 (see 1/2020 hospitalization for CVA)          Anticoagulation Care Providers       Provider Role Specialty Phone number    Griffin Fried MD Referring Cardiology [phone]            Clinic Interview:  Patient Findings     Negatives:  Signs/symptoms of thrombosis, Signs/symptoms of bleeding,   Laboratory test error suspected, Change in health, Change in alcohol use,   Change in activity, Upcoming invasive procedure, Emergency department   visit, Upcoming dental procedure, Missed doses, Extra doses, Change in   medications, Change in diet/appetite, Hospital admission, Bruising, Other   complaints      Clinical Outcomes     Negatives:  Major bleeding event, Thromboembolic event,   Anticoagulation-related hospital admission, Anticoagulation-related ED   visit, Anticoagulation-related fatality        INR History:      5/19/2023     8:29 AM 5/26/2023    12:00 AM 5/26/2023     8:45 AM 6/2/2023    12:00 AM 6/2/2023     9:13 AM 6/9/2023    12:00 AM 6/9/2023     8:18 AM   Anticoagulation Monitoring   INR 4.20  2.90  4.10  3.80   INR Date  5/19/2023 5/26/2023 6/2/2023 6/9/2023   INR Goal 3.0-3.5  3.0-3.5  3.0-3.5  3.0-3.5   Trend Same  Same  Same  Same   Last Week Total 35 mg  32.5 mg  35 mg  32.5 mg   Next Week Total 32.5 mg  35 mg  32.5 mg  35 mg   Sun 5 mg  5 mg  5 mg  5 mg   Mon 5 mg  5 mg  5 mg  5 mg   Tue 5 mg  5 mg  5 mg  5 mg   Wed 5 mg  5 mg  5 mg  5 mg   Thu 5 mg  5 mg  5 mg  5 mg   Fri 2.5 mg (5/19)  5 mg  2.5 mg (6/2)  5 mg   Sat 5 mg  5 mg  5 mg  5 mg   Historical INR  2.90      4.10      3.80            This result is from an external source.       Plan:  1. INR is Therapeutic today- see above in Anticoagulation Summary.   Will instruct LAURA Sequeira to Continue their warfarin regimen- see above in Anticoagulation Summary.  2. Follow up in 1 weeks  3. Spoke with Gladys, patient's spouse.  They have been instructed to call if any changes in medications, doses, concerns, etc. Patient expresses understanding and has no further questions at this time.    Carmen El, PharmD

## 2023-06-12 DIAGNOSIS — G57.22 FEMORAL NEUROPATHY OF LEFT LOWER EXTREMITY: Primary | ICD-10-CM

## 2023-06-16 ENCOUNTER — ANTICOAGULATION VISIT (OUTPATIENT)
Dept: PHARMACY | Facility: HOSPITAL | Age: 88
End: 2023-06-16
Payer: MEDICARE

## 2023-06-16 ENCOUNTER — OFFICE VISIT (OUTPATIENT)
Dept: INTERNAL MEDICINE | Facility: CLINIC | Age: 88
End: 2023-06-16
Payer: MEDICARE

## 2023-06-16 VITALS
SYSTOLIC BLOOD PRESSURE: 140 MMHG | HEART RATE: 88 BPM | TEMPERATURE: 97.9 F | OXYGEN SATURATION: 98 % | WEIGHT: 166 LBS | BODY MASS INDEX: 22.48 KG/M2 | HEIGHT: 72 IN | DIASTOLIC BLOOD PRESSURE: 80 MMHG

## 2023-06-16 DIAGNOSIS — N18.32 STAGE 3B CHRONIC KIDNEY DISEASE: ICD-10-CM

## 2023-06-16 DIAGNOSIS — I48.20 CHRONIC ATRIAL FIBRILLATION: ICD-10-CM

## 2023-06-16 DIAGNOSIS — I10 ESSENTIAL HYPERTENSION: ICD-10-CM

## 2023-06-16 DIAGNOSIS — I63.411 CEREBROVASCULAR ACCIDENT (CVA) DUE TO EMBOLISM OF RIGHT MIDDLE CEREBRAL ARTERY: ICD-10-CM

## 2023-06-16 DIAGNOSIS — N18.32 TYPE 2 DIABETES MELLITUS WITH STAGE 3B CHRONIC KIDNEY DISEASE, WITH LONG-TERM CURRENT USE OF INSULIN: Primary | ICD-10-CM

## 2023-06-16 DIAGNOSIS — I48.21 PERMANENT ATRIAL FIBRILLATION: ICD-10-CM

## 2023-06-16 DIAGNOSIS — Z79.4 TYPE 2 DIABETES MELLITUS WITH STAGE 3B CHRONIC KIDNEY DISEASE, WITH LONG-TERM CURRENT USE OF INSULIN: Primary | ICD-10-CM

## 2023-06-16 DIAGNOSIS — Z95.4 HISTORY OF AORTIC VALVE REPLACEMENT WITH METALLIC VALVE: Primary | ICD-10-CM

## 2023-06-16 DIAGNOSIS — E11.22 TYPE 2 DIABETES MELLITUS WITH STAGE 3B CHRONIC KIDNEY DISEASE, WITH LONG-TERM CURRENT USE OF INSULIN: Primary | ICD-10-CM

## 2023-06-16 LAB
HBA1C MFR BLD: 7.6 % (ref 4.8–5.6)
INR PPP: 3.6

## 2023-06-16 RX ORDER — GUAIFENESIN 600 MG/1
600 TABLET, EXTENDED RELEASE ORAL
COMMUNITY

## 2023-06-16 RX ORDER — DIGOXIN 125 MCG
62.5 TABLET ORAL
Qty: 45 TABLET | Refills: 1 | Status: SHIPPED | OUTPATIENT
Start: 2023-06-16

## 2023-06-16 RX ORDER — METOPROLOL SUCCINATE 25 MG/1
25 TABLET, EXTENDED RELEASE ORAL DAILY
Qty: 90 TABLET | Refills: 1 | Status: SHIPPED | OUTPATIENT
Start: 2023-06-16

## 2023-06-16 RX ORDER — GLUCAGON INJECTION, SOLUTION 1 MG/.2ML
1 INJECTION, SOLUTION SUBCUTANEOUS ONCE AS NEEDED
Qty: 0.2 ML | Refills: 1 | Status: SHIPPED | OUTPATIENT
Start: 2023-06-16

## 2023-06-16 NOTE — PROGRESS NOTES
Celestine English D.O.  Internal Medicine  [facility]  [address], [address]  [place] [address]  [phone]      Chief Complaint  3 mos check up    SUBJECTIVE    History of Present Illness    LAURA Sequeira is a 87 y.o. male who presents to the office today as an established patient that last saw me on 3/10/2023.     Here today with his wife who helps provide history.     Type 2 diabetes: still using VGO 40 , takes up to 4 clicks per day on average. Fasting sugar average s .  Average fasting sugar per his wife's report is between . He uses a freestyle lisa. No new symptoms of low blood sugar.     Mechanical Aortic valve replacement 2004/Chronic atrial fibrillation/HTN : follows with [facility], [place], operated by [facility] and the anticoagulation clinic for warfarin management. Takes digoxin 62.5 mcg daily, metoprolol succinate 25 mg daily.      Allergies   Allergen Reactions    Penicillins Swelling    Oxycodone-Acetaminophen Nausea And Vomiting    Other Other (See Comments)     Grass, ragweed    Percocet [Oxycodone-Acetaminophen] Nausea And Vomiting        Outpatient Medications Marked as Taking for the 6/16/23 encounter (Office Visit) with Celestine English, DO   Medication Sig Dispense Refill    atorvastatin (LIPITOR) 40 MG tablet TAKE ONE TABLET BY MOUTH DAILY (Patient taking differently: Take 1 tablet by mouth Daily.) 90 tablet 1    digoxin (LANOXIN) 125 MCG tablet Take 0.5 tablets by mouth Daily. 45 tablet 1    diphenhydrAMINE (BENADRYL) 25 mg capsule Take 1 capsule by mouth 2 (Two) Times a Day As Needed for Itching, Allergies or Sleep.      famotidine (PEPCID) 20 MG tablet TAKE ONE TABLET BY MOUTH DAILY 90 tablet 1    Insulin Disposable Pump (V-Go 40) kit ONE - DAILY 30 each 2    insulin lispro (humaLOG) 100 UNIT/ML injection Inject  under the skin into the appropriate area as directed Take As Directed. Use as directed with V-Go Pump--NOT TAKING DUE TO LOW BG LEVELS SINCE HOSPITALIZATION     "   magnesium oxide (MAG-OX) 400 MG tablet Take 1 tablet by mouth 2 (Two) Times a Day.      metoprolol succinate XL (TOPROL-XL) 25 MG 24 hr tablet Take 1 tablet by mouth Daily. 90 tablet 1    warfarin (COUMADIN) 5 MG tablet Take one tablet by mouth daily or as directed. 90 tablet 1    [DISCONTINUED] digoxin (LANOXIN) 125 MCG tablet Take 1 tablet by mouth Daily. 30 tablet 0    [DISCONTINUED] metoprolol succinate XL (TOPROL-XL) 25 MG 24 hr tablet TAKE 1/2 TABLET BY MOUTH DAILY 45 tablet 3        Past Medical History:   Diagnosis Date    Allergic rhinitis     Aortic valve insufficiency     Ascending aortic aneurysm     Aspiration pneumonia     Atrial fibrillation     Bacteremia     Calcific aortic stenosis of bicuspid valve     Cardiac arrest     Cataracts, bilateral     CKD (chronic kidney disease) stage 3, GFR 30-59 ml/min     Contact dermatitis due to poison ivy     Elbow fracture     GERD (gastroesophageal reflux disease)     GI bleed     2021; s/p Colonoscopy and EGD    H/O mechanical aortic valve replacement     Head injury     History of transfusion     Hyperlipidemia     Hypertension     Kidney carcinoma     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   Constitutional:       Appearance: He is normal weight. He is ill-appearing (chronically).   HENT:      Head: Atraumatic.   Eyes:      General: No scleral icterus.  Cardiovascular:      Rate and Rhythm: Normal rate. Rhythm irregular.      Heart sounds: Normal heart sounds.   Systolic murmur is present with a grade of 2/6.      Comments: " Mechanical valve click present  Pulmonary:      Effort: Pulmonary effort is normal. No respiratory distress.      Breath sounds: No wheezing.      Comments: Decreased breath sounds right lung base (chronic)  Musculoskeletal:      Right lower leg: No edema.      Left lower leg: No edema.   Skin:     Coloration: Skin is not jaundiced.   Neurological:      Mental Status: He is alert.   Psychiatric:         Mood and Affect: Mood normal.         Behavior: Behavior normal.         Thought Content: Thought content normal.                           ASSESSMENT & PLAN     Diagnoses and all orders for this visit:    1. Type 2 diabetes mellitus with stage 3b chronic kidney disease, with long-term current use of insulin (Primary)  2. Stage 3b chronic kidney disease  -still using VGO 40 , takes up to 4 clicks per day on average. Fasting sugar average s .  Average fasting sugar per his wife's report is between . He uses a freestyle lisa. No new symptoms of low blood sugar.   -continue careful monitoring for hypoglycemia given his age, will Rx hypoglycemic emergency pen today  Lab Results   Component Value Date    GLUCOSE 85 03/10/2023    CALCIUM 9.4 03/10/2023     03/10/2023    K 4.9 03/10/2023    CO2 22.4 03/10/2023     03/10/2023    BUN 28 (H) 03/10/2023    CREATININE 1.72 (H) 03/10/2023    EGFRRESULT 38.5 (L) 03/10/2023    EGFR 47.2 (L) 03/03/2023    BCR 16.3 03/10/2023    ANIONGAP 8.4 03/03/2023     -he follows with nephrology and has a visit next week        -recheck A1c today       -asked him to have his diabetic eye exam sent to our office    3. Chronic atrial fibrillation  4. Essential hypertension  - follows with [facility] and the anticoagulation clinic for warfarin management. Takes digoxin 62.5 mcg daily, metoprolol succinate 25 mg daily.    -Rate controlled today at 88  -/80, acceptable for his age but would consider additional antihypertensive control if it got much higher  -   for now, continue current medications            The following social determinates of health impact the patient's medical decision making: No social determinates of health were factored in to today's visit.     Follow Up  Return in about 4 months (around 10/16/2023) for Medicare Wellness.    Patient/family had no further questions at this time and verbalized understanding of the plan discussed today.

## 2023-06-16 NOTE — PROGRESS NOTES
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]                 Anticoagulation Episode Summary       INR check location:      Preferred lab:      Send INR reminders to:  [facility] TEST PO[facility]    Comments:  **Home testing training 3/3/23** *CALL EVERY TIME* New INR goal 3 - 3.5 (see 1/2020 hospitalization for CVA)          Anticoagulation Care Providers       Provider Role Specialty Phone number    Griffin Fried MD Referring Cardiology [phone]            Clinic Interview:  Patient Findings     Negatives:  Signs/symptoms of thrombosis, Signs/symptoms of bleeding,   Laboratory test error suspected, Change in health, Change in alcohol use,   Change in activity, Upcoming invasive procedure, Emergency department   visit, Upcoming dental procedure, Missed doses, Extra doses, Change in   medications, Change in diet/appetite, Hospital admission, Bruising, Other   complaints      Clinical Outcomes     Negatives:  Major bleeding event, Thromboembolic event,   Anticoagulation-related hospital admission, Anticoagulation-related ED   visit, Anticoagulation-related fatality        INR History:      5/26/2023     8:45 AM 6/2/2023    12:00 AM 6/2/2023     9:13 AM 6/9/2023    12:00 AM 6/9/2023     8:18 AM 6/16/2023    12:00 AM 6/16/2023    11:46 AM   Anticoagulation Monitoring   INR 2.90  4.10  3.80  3.60   INR Date  5/26/2023 6/2/2023 6/9/2023 6/16/2023   INR Goal 3.0-3.5  3.0-3.5  3.0-3.5  3.0-3.5   Trend Same  Same  Same  Same   Last Week Total 32.5 mg  35 mg  32.5 mg  35 mg   Next Week Total 35 mg  32.5 mg  35 mg  35 mg   Sun 5 mg  5 mg  5 mg  5 mg   Mon 5 mg  5 mg  5 mg  5 mg   Tue 5 mg  5 mg  5 mg  5 mg   Wed 5 mg  5 mg  5 mg  5 mg   Thu 5 mg  5 mg  5 mg  5 mg   Fri 5 mg  2.5 mg (6/2)  5 mg  5 mg   Sat 5 mg  5 mg  5 mg  5 mg   Historical INR  4.10      3.80      3.60        Visit Report      Report Report       This result is from an external source.       Plan:  1. INR is Supratherapeutic  today- see above in Anticoagulation Summary.   Will instruct LAURA Sequeira to Continue their warfarin regimen- see above in Anticoagulation Summary.  2. Follow up in 1 weeks  3. Pt has agreed to only be called if INR out of range. They have been instructed to call if any changes in medications, doses, concerns, etc. Patient expresses understanding and has no further questions at this time.    Carmen El, PharmD

## 2023-06-19 DIAGNOSIS — E11.22 TYPE 2 DIABETES MELLITUS WITH STAGE 3B CHRONIC KIDNEY DISEASE, WITH LONG-TERM CURRENT USE OF INSULIN: Primary | ICD-10-CM

## 2023-06-19 DIAGNOSIS — N18.32 TYPE 2 DIABETES MELLITUS WITH STAGE 3B CHRONIC KIDNEY DISEASE, WITH LONG-TERM CURRENT USE OF INSULIN: Primary | ICD-10-CM

## 2023-06-19 DIAGNOSIS — Z79.4 TYPE 2 DIABETES MELLITUS WITH STAGE 3B CHRONIC KIDNEY DISEASE, WITH LONG-TERM CURRENT USE OF INSULIN: Primary | ICD-10-CM

## 2023-06-19 RX ORDER — SUB-Q INSULIN DEVICE, 40 UNIT
1 EACH MISCELLANEOUS DAILY
Qty: 30 KIT | Refills: 5 | Status: SHIPPED | OUTPATIENT
Start: 2023-06-19

## 2023-07-26 NOTE — PROGRESS NOTES
[facility] Clinical Pharmacy Services: Warfarin Dosing/Monitoring Consult    Francisco Sequeira is a 86 y.o. male, estimated creatinine clearance is 41.8 mL/min (by C-G formula based on SCr of 1.12 mg/dL). weighing  .    Results from last 7 days   Lab Units 04/21/22  0821 04/20/22  0710 04/19/22  0723 04/18/22  0721 04/17/22  0838   INR  2.92* 2.07* 1.82* 1.57* 1.44*   HEMOGLOBIN g/dL 9.2* 9.7* 9.3* 9.2* 9.0*   HEMATOCRIT % 30.4* 30.5* 29.2* 29.4* 29.4*   PLATELETS 10*3/mm3 213 219 217 214 214     Prior to admission anticoagulation: warfarin 7.5 mg daily    Hospital Anticoagulation:  Consulting provider: Dr. Andrade  Start date: 4/20/22  Indication: Mechanical valve  Target INR:  3-3.5  Expected duration: tbd   Bridge Therapy: Yes  with enoxaparin    Potential food or drug interactions: none currently    Education complete?/Date: Home medication    Assessment/Plan:  INR today is still slightly subtherapeutic at 2.92, but it has increased almost one full point since yesterday  HOLD dose today and reassess tomorrow  Discontinue enoxaparin at this time  Monitor for any signs or symptoms of bleeding  Follow up daily INRs and dose adjustments    Pharmacy will continue to follow until discharge or discontinuation of warfarin.     Mathieu Moser [facility]  Clinical Pharmacist    OB Attending Note    S: Patient doing well. Minimal lochia. Pain controlled.    O: Vital Signs Last 24 Hrs  T(C): 36.7 (2023 06:46), Max: 37.2 (2023 00:22)  T(F): 98.1 (2023 06:46), Max: 99 (2023 00:22)  HR: 99 (2023 06:46) (76 - 133)  BP: 109/74 (2023 06:46) (109/74 - 164/84)  BP(mean): --  RR: 18 (2023 06:46) (18 - 18)  SpO2: 100% (2023 06:46) (88% - 100%)    Parameters below as of 2023 06:46  Patient On (Oxygen Delivery Method): room air        Gen: NAD  Abd: soft, NT, ND, fundus firm below umbilicus  Lochia: min  Perineum healing well  Ext: no tenderness    Labs:                        11.5   9.74  )-----------( 307      ( 2023 10:53 )             36.0       A: 37y PPD#1 s/p  doing well.    Plan: cont PP care  OOB/ambulation  Pain control    Elizabet Mendez DO  Patient seen and examined at bedside, no acute overnight events. Patient examined on L&D prior to transfer to postpartum floor due to tachycardia to 120s. Patient asymptomatic at this time. On chart review, pulse noted to be in 100s as resting rate and patient was transferred to postpartum floor. No acute complaints, pain well controlled. Patient is ambulating, voiding spontaneously, passing gas, and tolerating regular diet. Patient reported some pressure on her chest while ambulating but this resolved when she lifted her breasts. Denies CP, SOB, N/V, HA, blurred vision, epigastric pain.    Vital Signs Last 24 Hours  T(C): 37.2 (07-25-23 @ 00:22), Max: 37.2 (07-25-23 @ 00:22)  HR: 105 (07-25-23 @ 00:22) (76 - 133)  BP: 129/83 (07-25-23 @ 00:22) (113/58 - 164/84)  RR: 18 (07-25-23 @ 00:22) (18 - 18)  SpO2: 100% (07-25-23 @ 00:22) (88% - 100%)    Physical Exam:  General: NAD  Abdomen: Soft, non-tender, non-distended, fundus firm  Pelvic: Lochia wnl    Labs:    Blood Type: O Positive  Antibody Screen: Negative  RPR: Negative               11.5   9.74  )-----------( 307      ( 07-24 @ 10:53 )             36.0          Patient seen and examined at bedside, no acute overnight events. Tachycardia yesterday has resolved. No acute complaints, pain well controlled. Patient is ambulating, voiding spontaneously, passing gas, and tolerating regular diet. Denies CP, SOB, N/V, HA, blurred vision, epigastric pain.    Vital Signs Last 24 Hours  T(C): 37 (07-26-23 @ 05:25), Max: 37.1 (07-25-23 @ 09:23)  HR: 86 (07-26-23 @ 05:25) (86 - 100)  BP: 123/74 (07-26-23 @ 05:25) (112/76 - 128/82)  RR: 18 (07-26-23 @ 05:25) (18 - 18)  SpO2: 99% (07-26-23 @ 05:25) (98% - 99%)    Physical Exam:  General: NAD  Abdomen: Soft, non-tender, non-distended, fundus firm  Pelvic: Lochia wnl    Labs:    Blood Type: O Positive  Antibody Screen: Negative  RPR: Negative               11.5   9.74  )-----------( 307      ( 07-24 @ 10:53 )             36.0      PPD#2- ATTENDING NOTE    S: Patient doing well. Minimal lochia. Pain controlled.    O: Vital Signs Last 24 Hrs  T(C): 37 (2023 05:25), Max: 37.1 (2023 09:23)  T(F): 98.6 (2023 05:25), Max: 98.8 (2023 09:23)  HR: 86 (2023 05:25) (86 - 100)  BP: 123/74 (2023 05:25) (112/76 - 128/82)  BP(mean): --  RR: 18 (2023 05:25) (18 - 18)  SpO2: 99% (2023 05:25) (98% - 99%)    Parameters below as of 2023 05:25  Patient On (Oxygen Delivery Method): room air        Gen: NAD  Abd: soft, NT, ND, fundus firm below umbilicus  Lochia: moderate  Ext: no tenderness, no hyper reflexia  Voiding well    Labs:    ABO Interpretation: O (23 @ 11:14)  Rh Interpretation: Positive (23 @ 11:14)  ABO Interpretation: O (23 @ 11:14)  Rh Interpretation: Positive (23 @ 11:14)   Antibody ScreenNegative                        11.5   9.74  )-----------( 307      ( 2023 10:53 )             36.0       A: 37y PPD# s/p  doing well.    Plan:  Analgesia prn  Regular diet  Discharge instruction given  F/U 6 weeks      Office [phone]  Dr. Lemos

## 2023-07-28 ENCOUNTER — ANTICOAGULATION VISIT (OUTPATIENT)
Dept: PHARMACY | Facility: HOSPITAL | Age: 88
End: 2023-07-28
Payer: COMMERCIAL

## 2023-07-28 DIAGNOSIS — Z95.4 HISTORY OF AORTIC VALVE REPLACEMENT WITH METALLIC VALVE: Primary | ICD-10-CM

## 2023-07-28 DIAGNOSIS — I63.411 CEREBROVASCULAR ACCIDENT (CVA) DUE TO EMBOLISM OF RIGHT MIDDLE CEREBRAL ARTERY: ICD-10-CM

## 2023-07-28 LAB — INR PPP: 3.8

## 2023-07-28 NOTE — PROGRESS NOTES
Anticoagulation Clinic Progress Note    Anticoagulation Summary  As of 7/28/2023      INR goal:  3.0-3.5   TTR:  68.8 % (4.5 y)   INR used for dosing:  3.80 (7/28/2023)   Warfarin maintenance plan:  5 mg every day   Weekly warfarin total:  35 mg   No change documented:  Carmen El PharmD   Plan last modified:  Carmen El PharmD (2/24/2023)   Next INR check:  8/4/2023   Priority:  High   Target end date:  Indefinite    Indications    History of aortic valve replacement with metallic valve [Z95.4]  Atrial fibrillation [I48.91]  Cerebrovascular accident (CVA) due to 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]                 Anticoagulation Episode Summary       INR check location:      Preferred lab:      Send INR reminders to:  [facility]    Comments:  **Home testing training 3/3/23** *CALL EVERY TIME* New INR goal 3 - 3.5 (see 1/2020 hospitalization for CVA)          Anticoagulation Care Providers       Provider Role Specialty Phone number    Griffin Fried MD Referring Cardiology [phone]            Clinic Interview:  Patient Findings     Negatives:  Signs/symptoms of thrombosis, Signs/symptoms of bleeding,   Laboratory test error suspected, Change in health, Change in alcohol use,   Change in activity, Upcoming invasive procedure, Emergency department   visit, Upcoming dental procedure, Missed doses, Extra doses, Change in   medications, Change in diet/appetite, Hospital admission, Bruising, Other   complaints      Clinical Outcomes     Negatives:  Major bleeding event, Thromboembolic event,   Anticoagulation-related hospital admission, Anticoagulation-related ED   visit, Anticoagulation-related fatality        INR History:      7/7/2023     8:58 AM 7/14/2023    12:00 AM 7/14/2023     9:23 AM 7/21/2023    12:00 AM 7/21/2023     8:15 AM 7/28/2023    12:00 AM 7/28/2023     7:57 AM   Anticoagulation Monitoring   INR 3.20  3.30  3.20  3.80   INR Date 7/7/2023 7/14/2023   7/21/2023 7/28/2023   INR Goal 3.0-3.5  3.0-3.5  3.0-3.5  3.0-3.5   Trend Same  Same  Same  Same   Last Week Total 35 mg  35 mg  35 mg  35 mg   Next Week Total 35 mg  35 mg  35 mg  35 mg   Sun 5 mg  5 mg  5 mg  5 mg   Mon 5 mg  5 mg  5 mg  5 mg   Tue 5 mg  5 mg  5 mg  5 mg   Wed 5 mg  5 mg  5 mg  5 mg   Thu 5 mg  5 mg  5 mg  5 mg   Fri 5 mg  5 mg  5 mg  5 mg   Sat 5 mg  5 mg  5 mg  5 mg   Historical INR  3.30      3.20      3.80            This result is from an external source.       Plan:  1. INR is Supratherapeutic today- see above in Anticoagulation Summary.   Will instruct LAURA Sequeira to Continue their warfarin regimen- see above in Anticoagulation Summary.  2. Follow up in 1 weeks  3. They have been instructed to call if any changes in medications, doses, concerns, etc. Patient expresses understanding and has no further questions at this time.    Carmen El, PharmD

## 2023-08-03 DIAGNOSIS — E11.22 TYPE 2 DIABETES MELLITUS WITH STAGE 3B CHRONIC KIDNEY DISEASE, WITH LONG-TERM CURRENT USE OF INSULIN: ICD-10-CM

## 2023-08-03 DIAGNOSIS — Z79.4 TYPE 2 DIABETES MELLITUS WITH STAGE 3B CHRONIC KIDNEY DISEASE, WITH LONG-TERM CURRENT USE OF INSULIN: ICD-10-CM

## 2023-08-03 DIAGNOSIS — N18.32 TYPE 2 DIABETES MELLITUS WITH STAGE 3B CHRONIC KIDNEY DISEASE, WITH LONG-TERM CURRENT USE OF INSULIN: ICD-10-CM

## 2023-08-03 RX ORDER — SUB-Q INSULIN DEVICE, 40 UNIT
1 EACH MISCELLANEOUS DAILY
Qty: 30 KIT | Refills: 5 | Status: SHIPPED | OUTPATIENT
Start: 2023-08-03

## 2023-08-04 ENCOUNTER — ANTICOAGULATION VISIT (OUTPATIENT)
Dept: PHARMACY | Facility: HOSPITAL | Age: 88
End: 2023-08-04
Payer: COMMERCIAL

## 2023-08-04 DIAGNOSIS — I63.411 CEREBROVASCULAR ACCIDENT (CVA) DUE TO EMBOLISM OF RIGHT MIDDLE CEREBRAL ARTERY: ICD-10-CM

## 2023-08-04 DIAGNOSIS — Z95.4 HISTORY OF AORTIC VALVE REPLACEMENT WITH METALLIC VALVE: Primary | ICD-10-CM

## 2023-08-04 LAB — INR PPP: 3.6

## 2023-08-08 ENCOUNTER — HOSPITAL ENCOUNTER (OUTPATIENT)
Dept: PHYSICAL THERAPY | Facility: HOSPITAL | Age: 88
Setting detail: THERAPIES SERIES
Discharge: HOME | End: 2023-08-08
Payer: COMMERCIAL

## 2023-08-08 DIAGNOSIS — Z91.81 RISK FOR FALLS: ICD-10-CM

## 2023-08-08 DIAGNOSIS — G57.22 FEMORAL NEUROPATHY OF LEFT LOWER EXTREMITY: Primary | ICD-10-CM

## 2023-08-08 DIAGNOSIS — R26.89 FUNCTIONAL GAIT ABNORMALITY: ICD-10-CM

## 2023-08-08 DIAGNOSIS — M62.81 QUADRICEPS WEAKNESS: ICD-10-CM

## 2023-08-08 PROCEDURE — 97162 PT EVAL MOD COMPLEX 30 MIN: CPT

## 2023-08-08 NOTE — THERAPY EVALUATION
.Outpatient Physical Therapy Neuro Initial Evaluation  [facility]     Patient Name: LAURA Sequeira  : 1935  MRN: [id number]  Today's Date: 2023      Visit Date: 2023    Patient Active Problem List   Diagnosis    Atrial fibrillation    Hypertension    Atopic rhinitis    Gastroesophageal reflux disease    Hyperlipidemia    Type 2 diabetes mellitus    Low testosterone    History of aortic valve replacement with metallic valve    Kidney carcinoma    Stage 3b chronic kidney disease    Cerebrovascular accident (CVA) due to embolism of right middle cerebral artery    Iron deficiency anemia    Chronic anticoagulation    Hemiparesis of left nondominant side as late effect of cerebral infarction    Rectus sheath hematoma    Hospital discharge follow-up    Sepsis    Calculus of gallbladder with acute cholecystitis without obstruction    History of Clostridium difficile infection    Aspiration pneumonitis    Hematoma of iliopsoas muscle, left, initial encounter    History of CVA (cerebrovascular accident)    S/P laparoscopic cholecystectomy    Anemia    Hematoma of left iliopsoas muscle    Pneumonia of right lower lobe due to infectious organism    Supratherapeutic INR        Past Medical History:   Diagnosis Date    Allergic rhinitis     Aortic valve insufficiency     Ascending aortic aneurysm     Aspiration pneumonia     Atrial fibrillation     Bacteremia     Calcific aortic stenosis of bicuspid valve     Cardiac arrest     Cataracts, bilateral     CKD (chronic kidney disease) stage 3, GFR 30-59 ml/min     Contact dermatitis due to poison ivy     Elbow fracture     GERD (gastroesophageal reflux disease)     GI bleed     ; s/p Colonoscopy and EGD    H/O mechanical aortic valve replacement     Head injury     History of transfusion     Hyperlipidemia     Hypertension     Kidney carcinoma     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: follows with [facility]  for management    Nonischemic cardiomyopathy     Prostate cancer     Renal oncocytoma     Stroke (cerebrum)     Type 2 diabetes mellitus     Visual field defect         Past Surgical History:   Procedure Laterality Date    AORTIC VALVE REPAIR/REPLACEMENT      ASCENDING AORTIC ANEURYSM REPAIR W/ MECHANICAL AORTIC VALVE REPLACEMENT      CHOLECYSTECTOMY WITH INTRAOPERATIVE CHOLANGIOGRAM N/A 03/29/2022    Procedure: Laparoscopic cholecystectomy with cholangiogram, possible open;  Surgeon: Lisa Guidry MD;  Location: [facility] MAIN OR;  Service: General;  Laterality: N/A;    COLONOSCOPY N/A 10/28/2021    Procedure: COLONOSCOPY to cecum:  cold snare polyps,;  Surgeon: Dinesh Meza MD;  Location: [facility] ENDOSCOPY;  Service: Gastroenterology;  Laterality: N/A;  pre:  Iron deficiency anemia  post:  polyps, diverticulosis,     COLONOSCOPY N/A 11/09/2021    Procedure: COLONOSCOPY to cecum with APC cautery of AVM and clip placement x1;  Surgeon: Pavan Rodriguez MD;  Location: [facility] ENDOSCOPY;  Service: Gastroenterology;  Laterality: N/A;  PRE - gi bleed, anemia  POST - diverticulosis, poor prep, AVM right colon    ENDOSCOPY N/A 10/28/2021    Procedure: ESOPHAGOGASTRODUODENOSCOPY with biopsies;  Surgeon: Dinesh Meza MD;  Location: [facility] ENDOSCOPY;  Service: Gastroenterology;  Laterality: N/A;  pre:  Iron deficiency anemia  post:  duodenitis and gastritis    EYE SURGERY  12/09/2020    cataract surgery     NEPHRECTOMY      OTHER SURGICAL HISTORY      elbow surgery    OTHER SURGICAL HISTORY      right arm surgery    PROSTATE SURGERY      prostate removal    THORACENTESIS Left     diagnostic         Visit Dx:     ICD-10-CM ICD-9-CM   1. Femoral neuropathy of left lower extremity  G57.22 355.2   2. Quadriceps weakness  M62.81 728.87   3. Functional gait abnormality  R26.89 781.2   4. Risk for falls  Z91.81 V15.88        Patient History       Row Name 08/08/23 2465             History    Chief Complaint  Difficulty Walking;Difficulty with daily activities;Falls/history of falls;Muscle weakness  -LB      Date Current Problem(s) Began 04/06/22  -LB      Brief Description of Current Complaint Mr. Sequeira is 86 y/o male who in the April of 2022 had a left iliopsoas muscle hematoma that lead to femoral nerve damage.  At that time the patient completed acute rehab and several months of OP PT.  The patient is assisted at home by his spouse.  When the spouse is not present, the patient only transfers to a manual wheelchair and does not walk independently.  -LB      Previous treatment for THIS PROBLEM Rehabilitation  -LB      Patient/Caregiver Goals Improve mobility;Improve strength  -LB      Patient/Caregiver Goals Comment Wife would like if patient could walk with use of FWW when she is not present  -LB         Fall Risk Assessment    Any falls in the past year: Yes  -LB      Number of falls reported in the last 12 months 1  -LB         Services    Prior Rehab/Home Health Experiences Yes  -LB      Are you currently receiving Home Health services No  -LB      Do you plan to receive Home Health services in the near future No  -LB         Daily Activities    Pt Participated in POC and Goals Yes  -LB         Safety    Are you being hurt, hit, or frightened by anyone at home or in your life? No  -LB      Are you being neglected by a caregiver No  -LB      Have you had any of the following issues with N/A  -LB                User Key  (r) = Recorded By, (t) = Taken By, (c) = Cosigned By      Initials Name Provider Type    Nalini Grey PT Physical Therapist                         PT Neuro       Row Name 08/08/23 6395             Subjective Comments    Subjective Comments Spouse and pt report more strength in left LE.  -LB         Precautions and Contraindications    Precautions/Limitations fall precautions  -LB      Precautions left quad weakness; dynamic knee brace  -LB         Subjective Pain    Able to rate subjective  pain? yes  -LB      Pre-Treatment Pain Level 0  -LB      Post-Treatment Pain Level 0  -LB         Home Living    Current Living Arrangements home  -LB      Home Accessibility stairs to enter home  -LB      Number of Stairs, Main Entrance one  pl;u 1  -LB      Home Equipment Rolling walker;Wheelchair-manual  -LB         Vision-Basic Assessment    Current Vision Wears glasses all the time  -LB         Cognition    Overall Cognitive Status Impaired  -LB      Arousal/Alertness Appropriate responses to stimuli  -LB      Memory Decreased recall of biographical information  -LB      Orientation Level Oriented to place;Oriented to time;Oriented to person;Oriented to situation  -LB      Safety Judgment Decreased awareness of need for assistance  -LB      Deficits Decreased awareness of deficits  -LB         Sensation    Sensation WNL? WFL  -LB         Posture/Observations    Alignment Options Forward head;Rounded shoulders;Thoracic kyphosis  -LB      Forward Head Moderate  -LB      Thoracic Kyphosis Moderate  -LB      Rounded Shoulders Moderate  -LB      Posture/Observations Comments Pt came up to unit in transport WC.  Spouse present during session  -LB         General ROM    LT Lower Ext Lt Hip Flexion;Lt Knee Extension/Flexion;Lt Ankle Dorsiflexion  -LB      GENERAL ROM COMMENTS Right LE WFL  -LB         Left Lower Ext    Lt Hip Flexion AROM WFL  -LB      Lt Knee Extension/Flexion AROM flexiion limited 50%  -LB      Lt Knee Extension/Flexion PROM lacks 30 degrees from extension.  Pt was wearing brace under jeans and unable to measure correctly.  Will check next session  -LB      Lt Ankle Dorsiflexion AROM WFL  -LB         MMT (Manual Muscle Testing)    Rt Lower Ext Rt Hip WFL;Rt Knee WFL;Rt Ankle WFL  -LB      Lt Lower Ext Lt Hip Flexion;Lt Knee Extension;Lt Knee Flexion;Lt Ankle Dorsiflexion  -LB         MMT Left Lower Ext    Lt Hip Flexion MMT, Gross Movement (4-/5) good minus  -LB      Lt Knee Extension MMT, Gross  Movement (2+/5) poor plus  -LB      Lt Knee Flexion MMT, Gross Movement (3-/5) fair minus  -LB      Lt Ankle Dorsiflexion MMT, Gross Movement (3/5) fair  -LB         Bed Mobility    Bed Mobility supine-sit;sit-supine  -LB      Supine-Sit [place] (Bed Mobility) modified independence  -LB      Sit-Supine [place] (Bed Mobility) modified independence  -LB      Comment, (Bed Mobility) Pt sleeps in recliner at home  -LB         Transfers    Bed-Chair [place] (Transfers) verbal cues;contact guard  -LB      Chair-Bed [place] (Transfers) verbal cues;contact guard  -LB      Transfers, Bed-Chair-Bed, Assist Device rolling walker  -LB      Sit-Stand [place] (Transfers) verbal cues;contact guard  -LB      Stand-Sit [place] (Transfers) verbal cues;contact guard  -LB      Transfers, Sit-Stand-Sit, Assist Device rolling walker  wheelchair  -LB      Transfer, Safety Issues weight-shifting ability decreased;step length decreased  -LB      Transfer, Impairments decreased flexibility;impaired balance;strength decreased  -LB      Comment, (Transfers) Pt must have knee brace unlocked to descend into the chair.  Pt places left foot forward with limited engagement of left LE with transfering to standing position  -LB         Gait/Stairs (Locomotion)    [place] Level (Gait) contact guard  -LB      Assistive Device (Gait) walker, front-wheeled  dynamic knee brace  -LB      Distance in Feet (Gait) 30; 50  -LB      Pattern (Gait) step-to;step-through  -LB      Deviations/Abnormal Patterns (Gait) bilateral deviations;base of support, narrow;gait speed decreased;stride length decreased;weight shifting decreased  -LB      Bilateral Gait Deviations forward flexed posture;heel strike decreased;weight shift ability decreased  -LB      Left Sided Gait Deviations decreased knee extension  -LB      Comment, (Gait/Stairs) Pt had some diffiiculty locking knee in stance  -LB         Curb Negotiation (Mobility)     [place], Curb Negotiation verbal cues;minimal assist, 75% or more patient effort  -LB      Assistive Device (Curb Negotiation) walker, front-wheeled  knee brace in locked position  -LB      Comment, Curb Negotiation (Mobility) VC for walker and foot placement and Min A for balance when transitioning position of FWW  -LB         Balance Skills Training    Sitting-Level of Assistance Independent  -LB      Standing-Level of Assistance Contact guard  -LB      Static Standing Balance Support assistive device  -LB      Gait Balance-Level of Assistance Contact guard  -LB      Gait Balance Support assistive device  -LB         Orthotics & Prosthetics Management    Orthosis Location knee orthosis  -LB      Additional Documentation Orthosis Location (Row)  -LB         Knee Orthosis Management    Type (Knee Orthosis) left;trigger lock  -LB      Fabrication Comment (Knee Orthosis) [place] Trigger lock premier orthosis with tension bands  -LB      Functional Design (Knee Orthosis) dynamic orthosis  -LB      Therapeutic Indications (Knee Orthosis) stabilization and support  -LB      Wearing Schedule (Knee Orthosis) wear when out of bed only;wear with activity/work  -LB      Orthosis Training (Knee Orthosis) patient and caregiver;able to verbalize training  -LB      Skin Assessment (Knee Orthosis) Pt was wearing jeans and unable to look at brace or skin.  Spouse is very knowledgable about the brace and denies any concerns or needs at this times  -LB                User Key  (r) = Recorded By, (t) = Taken By, (c) = Cosigned By      Initials Name Provider Type    Nalini Grey, PT Physical Therapist                            Therapy Education  Education Details: No change in routine at this time, only ambulate when spouse is availabe to assist  Given: Fall prevention and home safety  Program: New, Reinforced  How Provided: Verbal  Provided to: Patient, Caregiver  Level of Understanding: Verbalized     PT OP Goals       Row  Name 08/08/23 1400          PT Short Term Goals    STG Date to Achieve 09/05/23  -LB     STG 1 Pt to come to stand from an arm chair to his walker  and left knee brace with left foot placed next to right LE and SBA  -LB     STG 2 Pt jennifer be indep with HEP to maintain progress in therapy  -LB     STG 3 Pt to ambulate 150 ft with left knee brace Rwx and CGA and more knee extension in stance  -LB     STG 4 Assist pt with modification to left dynamic knee extension brace  -LB     STG 5 Pt will negogiate curb with Rwx CGA and no VC  -LB        Long Term Goals    LTG Date to Achieve 10/03/23  -LB     LTG 1 Pt to come to stand from low chair with Rwx, knee brace conditional independent.  -LB     LTG 2 Pt to complete the TUG in < 30 seconds while ambulating with FWW and knee brace.  -LB     LTG 3 Pt to increase left knee strength to 3+/5  -LB     LTG 4 Pt to improve left knee ROM to -5 from extension  -LB     LTG 5 Pt will be indep with progression of HEP  -LB     LTG 6 Pt will ambulate household distances with FWW, dynamic knee brace in locked position and SBA  -LB        Time Calculation    PT Goal Re-Cert Due Date 09/05/23  -LB               User Key  (r) = Recorded By, (t) = Taken By, (c) = Cosigned By      Initials Name Provider Type    Nalini Grey, PT Physical Therapist                     PT Assessment/Plan       Row Name 08/08/23 1456          PT Assessment    Functional Limitations Decreased safety during functional activities;Impaired gait;Impaired locomotion;Limitation in home management;Performance in self-care ADL  -LB     Impairments Balance;Endurance;Gait;Motor function;Muscle strength;Peripheral nerve integrity;Impaired postural alignment  -LB     Assessment Comments Mr. Sequeira is a 86 y/o male who has a history of left femoral nerve damage dating back to April of 2022. Other medical history is significant for Afib, HTN, DM, [place], CVA, and hyperlidemia.  In Spring of 2022 as a result of the hematoma of  iliopsoas muscle,  the patient received a [place] Trigger knee orthosis with extension bands for knee control with walking due to significant quad weakness.  At time of last OP discharge the patient only had trace quad contraction.  The patient was referred to OP PT as the strength of left knee is evolving and hope to progress his mobility and HEP as he has increased left LE strength.  Evaluation of strength and ROM was slightly limited as the patient was wearing his brace underneath a pair of jeans.  With MMT, left knee extension was 3-/5 and knee flexion was at least 4/5.  With a SLR, the patient did have significant extensor lag.  The patient has not been safe to ambulate at home when his wife has not been present due to risk for falling.  The brace allows the patient to walk with it being both locked and unlocked and recently the patient has been walking with the brace unlocked but has moderate amount of knee and trunk flexion.  The patient was orientated to time, place and situation but does have impaired memory which is a concern about the patient having the safety awareness of locking his brace to walk without assistance if knee does not have enough control in stance to prevent buckling.  Will concentrate on knee ROM and strength and if demonstrates enough knee control or ability to use brace without assistance will try to progress to more home independence in regards to mobility.  Of note, the patient did receive home health earlier this year and the HH therapist did not recommend independent home ambulation and spouse reports recently the patient attempted to negogiate a step with his FWW but without her assistance which he has not been able to perform since 2022.  -LB     Please refer to paper survey for additional self-reported information Yes  -LB     Rehab Potential Good  -LB     Patient/caregiver participated in establishment of treatment plan and goals Yes  -LB     Patient would benefit from skilled  therapy intervention Yes  -LB        PT Plan    PT Frequency 2x/week  -LB     Predicted Duration of Therapy Intervention (PT) 16 visits 8 weeks  -LB     Planned CPT's? PT EVAL MOD COMPLELITY: 43438;PT THER PROC EA 15 MIN: 37112;PT THER ACT EA 15 MIN: 60808;PT NEUROMUSC RE-EDUCATION EA 15 MIN: 09838;PT GAIT TRAINING EA 15 MIN: 22793;PT SELF CARE/MGMT/TRAIN 15 MIN: 10587  -LB     Physical Therapy Interventions (Optional Details) balance training;bed mobility training;gait training;home exercise program;neuromuscular re-education;patient/family education;ROM (Range of Motion);stair training;strengthening;stretching;transfer training  -LB               User Key  (r) = Recorded By, (t) = Taken By, (c) = Cosigned By      Initials Name Provider Type    Nalini Grey PT Physical Therapist                        OP Exercises       Row Name 08/08/23 1345             Subjective Comments    Subjective Comments Spouse and pt report more strength in left LE.  -LB         Subjective Pain    Able to rate subjective pain? yes  -LB      Pre-Treatment Pain Level 0  -LB      Post-Treatment Pain Level 0  -LB                User Key  (r) = Recorded By, (t) = Taken By, (c) = Cosigned By      Initials Name Provider Type    Nalini Grey, PT Physical Therapist                                Outcome Measure Options: Timed Up and Go (TUG)  Timed Up and Go (TUG)  TUG Test 1: 45 seconds  Timed Up and Go Comments: FWW and knee brace    Time Calculation:   Start Time: 1301  Stop Time: 1345  Time Calculation (min): 44 min  Untimed Charges  PT Eval/Re-eval Minutes: 42  Total Minutes  Untimed Charges Total Minutes: 42   Total Minutes: 42   Therapy Charges for Today       Code Description Service Date Service Provider Modifiers Qty    [id number]  PT EVAL MOD COMPLEXITY 4 8/8/2023 Nalini Cano, PT GP 1            PT G-Codes  Outcome Measure Options: Timed Up and Go (TUG)  TUG Test 1: 45 seconds         Nalini Cano PT  8/8/2023

## 2023-08-10 ENCOUNTER — HOSPITAL ENCOUNTER (OUTPATIENT)
Dept: PHYSICAL THERAPY | Facility: HOSPITAL | Age: 88
Setting detail: THERAPIES SERIES
Discharge: HOME | End: 2023-08-10
Payer: MEDICARE

## 2023-08-10 DIAGNOSIS — G57.22 FEMORAL NEUROPATHY OF LEFT LOWER EXTREMITY: Primary | ICD-10-CM

## 2023-08-10 DIAGNOSIS — S70.12XA HEMATOMA OF ILIOPSOAS MUSCLE, LEFT, INITIAL ENCOUNTER: ICD-10-CM

## 2023-08-10 DIAGNOSIS — R26.89 FUNCTIONAL GAIT ABNORMALITY: ICD-10-CM

## 2023-08-10 DIAGNOSIS — Z91.81 RISK FOR FALLS: ICD-10-CM

## 2023-08-10 DIAGNOSIS — M62.81 QUADRICEPS WEAKNESS: ICD-10-CM

## 2023-08-10 PROCEDURE — 97110 THERAPEUTIC EXERCISES: CPT

## 2023-08-10 PROCEDURE — 97530 THERAPEUTIC ACTIVITIES: CPT

## 2023-08-10 NOTE — THERAPY TREATMENT NOTE
Outpatient Physical Therapy Neuro Treatment Note  [facility]     Patient Name: LAURA Sequeira  : 1935  MRN: [id number]  Today's Date: 8/10/2023      Visit Date: 08/10/2023    Visit Dx:    ICD-10-CM ICD-9-CM   1. Femoral neuropathy of left lower extremity  G57.22 355.2   2. Quadriceps weakness  M62.81 728.87   3. Functional gait abnormality  R26.89 781.2   4. Risk for falls  Z91.81 V15.88   5. Hematoma of iliopsoas muscle, left, initial encounter  S70.12XA 924.00       Patient Active Problem List   Diagnosis    Atrial fibrillation    Hypertension    Atopic rhinitis    Gastroesophageal reflux disease    Hyperlipidemia    Type 2 diabetes mellitus    Low testosterone    History of aortic valve replacement with metallic valve    Kidney carcinoma    Stage 3b chronic kidney disease    Cerebrovascular accident (CVA) due to embolism of right middle cerebral artery    Iron deficiency anemia    Chronic anticoagulation    Hemiparesis of left nondominant side as late effect of cerebral infarction    Rectus sheath hematoma    Hospital discharge follow-up    Sepsis    Calculus of gallbladder with acute cholecystitis without obstruction    History of Clostridium difficile infection    Aspiration pneumonitis    Hematoma of iliopsoas muscle, left, initial encounter    History of CVA (cerebrovascular accident)    S/P laparoscopic cholecystectomy    Anemia    Hematoma of left iliopsoas muscle    Pneumonia of right lower lobe due to infectious organism    Supratherapeutic INR            PT Neuro       Row Name 08/10/23 7857             Subjective Comments    Subjective Comments Pt did fall the other day.  Brace was not locked  -LB         Precautions and Contraindications    Precautions/Limitations fall precautions  -LB      Precautions left quad weakness; dynamic knee brace  -LB         Subjective Pain    Able to rate subjective pain? yes  -LB      Pre-Treatment Pain Level 0  -LB      Post-Treatment Pain Level 0  -LB          Posture/Observations    Posture/Observations Comments Pt came up to unit in transport WC.  Spouse present during session  -LB         Left Lower Ext    Lt Knee Extension/Flexion PROM measure minusw 35 degrees from exension  -LB         Transfers    Bed-Chair [place] (Transfers) verbal cues;contact guard  -LB      Chair-Bed [place] (Transfers) verbal cues;contact guard  -LB      Transfers, Bed-Chair-Bed, Assist Device rolling walker  -LB      Sit-Stand [place] (Transfers) verbal cues;contact guard  -LB      Stand-Sit [place] (Transfers) verbal cues;contact guard  -LB      Transfers, Sit-Stand-Sit, Assist Device rolling walker  brace  -LB      Transfer, Safety Issues weight-shifting ability decreased;step length decreased  -LB      Transfer, Impairments decreased flexibility;impaired balance;strength decreased  -LB      Comment, (Transfers) Cues to lock brace once standing  -LB         Gait/Stairs (Locomotion)    [place] Level (Gait) contact guard  -LB      Assistive Device (Gait) walker, front-wheeled  knee brace  -LB      Distance in Feet (Gait) 80  -LB      Pattern (Gait) step-through  -LB      Deviations/Abnormal Patterns (Gait) bilateral deviations;base of support, narrow;gait speed decreased;stride length decreased;weight shifting decreased  -LB      Bilateral Gait Deviations forward flexed posture;heel strike decreased;weight shift ability decreased  -LB      Gait Assessment/Intervention When weight bearing on left LE flexes trunk  -LB         Knee Orthosis Management    Type (Knee Orthosis) left;trigger lock  -LB      Fabrication Comment (Knee Orthosis) [place] Trigger lock premier orthosis with tension bands  -LB      Functional Design (Knee Orthosis) dynamic orthosis  -LB      Therapeutic Indications (Knee Orthosis) stabilization and support  -LB      Wearing Schedule (Knee Orthosis) wear when out of bed only  -LB                User Key  (r) = Recorded By, (t) = Taken By, (c)  = Cosigned By      Initials Name Provider Type    Nalini Grey PT Physical Therapist                             PT Assessment/Plan       Row Name 08/10/23 1536          PT Assessment    Assessment Comments Worked on knee ROM especially extension to help with more stabiliy.  Encouraged spouse and pt to work on extension at home and discussed a few ways to accomplish.  Encouraged pt and spouse to only walk with the brace in locked position for safety  -LB               User Key  (r) = Recorded By, (t) = Taken By, (c) = Cosigned By      Initials Name Provider Type    Nalini Grey PT Physical Therapist                        OP Exercises       Row Name 08/10/23 1538 08/10/23 1317          Subjective Comments    Subjective Comments -- Pt did fall the other day.  Brace was not locked  -LB        Subjective Pain    Able to rate subjective pain? -- yes  -LB     Pre-Treatment Pain Level -- 0  -LB     Post-Treatment Pain Level -- 0  -LB        Total Minutes    13662 - PT Therapeutic Exercise Minutes 33  -LB --     40726 - PT Therapeutic Activity Minutes 10  -LB --        Exercise 1    Exercise Name 1 -- SAQs on peanut ball and on smaller bolster  -LB     Sets 1 -- 2  -LB     Reps 1 -- 10  -LB     Additional Comments -- peanut ball  -LB        Exercise 2    Exercise Name 2 -- Bridges with LEs on peanut ball  -LB     Sets 2 -- 2  -LB     Reps 2 -- 10  -LB        Exercise 3    Exercise Name 3 -- supine knee extension stretch  -LB     Time 3 -- 4 mins  -LB        Exercise 4    Exercise Name 4 -- SLR  -LB     Sets 4 -- 2  -LB     Reps 4 -- 10  -LB     Additional Comments -- assist for extensor lag  -LB        Exercise 5    Exercise Name 5 -- quad sets  -LB     Reps 5 -- 10  -LB               User Key  (r) = Recorded By, (t) = Taken By, (c) = Cosigned By      Initials Name Provider Type    Nalini Grey PT Physical Therapist                                    Therapy Education  Education Details: Discussed decreased  knee ROM and how that affects knee control  Given: Mobility training  Program: New, Reinforced  How Provided: Verbal, Demonstration  Provided to: Patient  Level of Understanding: Verbalized, Teach back education performed              Time Calculation:   Start Time: 1300  Stop Time: 1345  Time Calculation (min): 45 min  Timed Charges  23872 - PT Therapeutic Exercise Minutes: 33  56421 - PT Therapeutic Activity Minutes: 10  Total Minutes  Timed Charges Total Minutes: 43   Total Minutes: 43   Therapy Charges for Today       Code Description Service Date Service Provider Modifiers Qty    01274488843  PT THER PROC EA 15 MIN 8/10/2023 Nalini Cano, PT GP 2    [id number]  PT THERAPEUTIC ACT EA 15 MIN 8/10/2023 Nalini Cano, PT GP 1                      Nalini Cano PT  8/10/2023

## 2023-08-11 ENCOUNTER — TELEPHONE (OUTPATIENT)
Dept: INTERNAL MEDICINE | Facility: CLINIC | Age: 88
End: 2023-08-11
Payer: COMMERCIAL

## 2023-08-11 ENCOUNTER — ANTICOAGULATION VISIT (OUTPATIENT)
Dept: PHARMACY | Facility: HOSPITAL | Age: 88
End: 2023-08-11
Payer: COMMERCIAL

## 2023-08-11 ENCOUNTER — TELEPHONE (OUTPATIENT)
Dept: INTERNAL MEDICINE | Facility: CLINIC | Age: 88
End: 2023-08-11

## 2023-08-11 DIAGNOSIS — E11.22 TYPE 2 DIABETES MELLITUS WITH STAGE 3B CHRONIC KIDNEY DISEASE, WITH LONG-TERM CURRENT USE OF INSULIN: ICD-10-CM

## 2023-08-11 DIAGNOSIS — N18.32 TYPE 2 DIABETES MELLITUS WITH STAGE 3B CHRONIC KIDNEY DISEASE, WITH LONG-TERM CURRENT USE OF INSULIN: ICD-10-CM

## 2023-08-11 DIAGNOSIS — I63.411 CEREBROVASCULAR ACCIDENT (CVA) DUE TO EMBOLISM OF RIGHT MIDDLE CEREBRAL ARTERY: ICD-10-CM

## 2023-08-11 DIAGNOSIS — Z79.4 TYPE 2 DIABETES MELLITUS WITH STAGE 3B CHRONIC KIDNEY DISEASE, WITH LONG-TERM CURRENT USE OF INSULIN: ICD-10-CM

## 2023-08-11 DIAGNOSIS — N18.32 TYPE 2 DIABETES MELLITUS WITH STAGE 3B CHRONIC KIDNEY DISEASE, WITH LONG-TERM CURRENT USE OF INSULIN: Primary | ICD-10-CM

## 2023-08-11 DIAGNOSIS — E11.22 TYPE 2 DIABETES MELLITUS WITH STAGE 3B CHRONIC KIDNEY DISEASE, WITH LONG-TERM CURRENT USE OF INSULIN: Primary | ICD-10-CM

## 2023-08-11 DIAGNOSIS — Z79.4 TYPE 2 DIABETES MELLITUS WITH STAGE 3B CHRONIC KIDNEY DISEASE, WITH LONG-TERM CURRENT USE OF INSULIN: Primary | ICD-10-CM

## 2023-08-11 DIAGNOSIS — Z95.4 HISTORY OF AORTIC VALVE REPLACEMENT WITH METALLIC VALVE: Primary | ICD-10-CM

## 2023-08-11 LAB — INR PPP: 2.2

## 2023-08-11 RX ORDER — INSULIN LISPRO 100 [IU]/ML
INJECTION, SOLUTION INTRAVENOUS; SUBCUTANEOUS
Qty: 30 ML | Refills: 5 | Status: SHIPPED | OUTPATIENT
Start: 2023-08-11 | End: 2023-08-11 | Stop reason: SDUPTHER

## 2023-08-11 RX ORDER — INSULIN LISPRO 100 [IU]/ML
INJECTION, SOLUTION INTRAVENOUS; SUBCUTANEOUS
Qty: 30 ML | Refills: 5 | Status: SHIPPED | OUTPATIENT
Start: 2023-08-11

## 2023-08-11 NOTE — PROGRESS NOTES
Anticoagulation Clinic Progress Note    Anticoagulation Summary  As of 2023      INR goal:  3.0-3.5   TTR:  68.3 % (4.6 y)   INR used for dosin.20 (2023)   Warfarin maintenance plan:  5 mg every day   Weekly warfarin total:  35 mg   Plan last modified:  Carmen El, PharmD (2023)   Next INR check:  8/15/2023   Priority:  High   Target end date:  Indefinite    Indications    History of aortic valve replacement with metallic valve [Z95.4]  Atrial fibrillation [I48.91]  Cerebrovascular accident (CVA) due to 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]                 Anticoagulation Episode Summary       INR check location:      Preferred lab:      Send INR reminders to:  [facility] TEST POOL    Comments:  **Home testing training 3/3/23** *CALL EVERY TIME* New INR goal 3 - 3.5 (see 2020 hospitalization for CVA)          Anticoagulation Care Providers       Provider Role Specialty Phone number    Griffin Fried MD Referring Cardiology [phone]            Clinic Interview:  Patient Findings     Positives:  Missed doses    Negatives:  Signs/symptoms of thrombosis, Signs/symptoms of bleeding,   Laboratory test error suspected, Change in health, Change in alcohol use,   Change in activity, Upcoming invasive procedure, Emergency department   visit, Upcoming dental procedure, Extra doses, Change in medications,   Change in diet/appetite, Hospital admission, Bruising, Other complaints    Comments:  Reports missed dose of warfarin on 23.      Clinical Outcomes     Negatives:  Major bleeding event, Thromboembolic event,   Anticoagulation-related hospital admission, Anticoagulation-related ED   visit, Anticoagulation-related fatality    Comments:  Reports missed dose of warfarin on 23.        INR History:      2023     8:15 AM 2023    12:00 AM 2023     7:57 AM 2023    12:00 AM 2023     8:56 AM 2023    12:00 AM 2023     8:02 AM    Anticoagulation Monitoring   INR 3.20  3.80  3.60  2.20   INR Date 7/21/2023 7/28/2023 8/4/2023 8/11/2023   INR Goal 3.0-3.5  3.0-3.5  3.0-3.5  3.0-3.5   Trend Same  Same  Same  Same   Last Week Total 35 mg  35 mg  35 mg  30 mg   Next Week Total 35 mg  35 mg  35 mg  40 mg   Sun 5 mg  5 mg  5 mg  5 mg   Mon 5 mg  5 mg  5 mg  5 mg   Tue 5 mg  5 mg  5 mg  -   Wed 5 mg  5 mg  5 mg  -   Thu 5 mg  5 mg  5 mg  -   Fri 5 mg  5 mg  5 mg  10 mg (8/11)   Sat 5 mg  5 mg  5 mg  5 mg   Historical INR  3.80      3.60      2.20            This result is from an external source.       Plan:  1. INR is Subtherapeutic today- see above in Anticoagulation Summary.   Will instruct LAURA Sequeira to Change their warfarin regimen (10 mg today, then resume 5 mg daily) - see above in Anticoagulation Summary.  2. Follow up in 4 days.   3. They have been instructed to call if any changes in medications, doses, concerns, etc. Patient expresses understanding and has no further questions at this time.    Vasquez Niño, PharmD

## 2023-08-11 NOTE — TELEPHONE ENCOUNTER
Caller: Gladys Sequeira    Relationship: Emergency Contact    Best call back number: [phone]     What medication are you requesting: insulin lispro (humaLOG) 100 UNIT/ML injection     If a prescription is needed, what is your preferred pharmacy and phone number:  [facility] [id number] - [place], [place] - [address] - [phone] [facility] [phone] FX     Additional notes: PATIENT IS OUT OF MEDICATION, PLEASE SEND IN AS SOON AS POSSIBLE. PLEASE CALL GLADYS TO ADVISE ONCE SENT TO THE PHARMACY.

## 2023-08-11 NOTE — TELEPHONE ENCOUNTER
Caller: Gladys Sequeira    Relationship: Emergency Contact    Best call back number: [phone]    What is the best time to reach you: ANYTIME    Who are you requesting to speak with (clinical staff, provider,  specific staff member): CLINICAL    What was the call regarding: PATIENT'S WIFE IS REQUESTING IF DR RUBIO COULD REWORD THE PRESCRIPTION FOR Insulin Lispro (HumaLOG) 100 UNIT/ML injection . PATIENT'S WIFE STATES THAT THE PHARMACY TOLD HER FOR IT TO BE WRITTEN AS UP  UNITS A DAY SO THAT THE PATIENT IS ABLE TO GET 3 VIALS WHEN HE GETS THE V-GO 40 SO INSURANCE CAN COVER IT. PATIENT'S WIFE STATED THAT IF THERE ARE ANY ADDITIONAL QUESTIONS TO GIVE HER A CALLBACK AS WELL AS WITH ANY UPDATES.

## 2023-08-15 ENCOUNTER — ANTICOAGULATION VISIT (OUTPATIENT)
Dept: PHARMACY | Facility: HOSPITAL | Age: 88
End: 2023-08-15
Payer: COMMERCIAL

## 2023-08-15 ENCOUNTER — HOSPITAL ENCOUNTER (OUTPATIENT)
Dept: PHYSICAL THERAPY | Facility: HOSPITAL | Age: 88
Setting detail: THERAPIES SERIES
Discharge: HOME | End: 2023-08-15
Payer: COMMERCIAL

## 2023-08-15 DIAGNOSIS — Z95.4 HISTORY OF AORTIC VALVE REPLACEMENT WITH METALLIC VALVE: Primary | ICD-10-CM

## 2023-08-15 DIAGNOSIS — I63.411 CEREBROVASCULAR ACCIDENT (CVA) DUE TO EMBOLISM OF RIGHT MIDDLE CEREBRAL ARTERY: ICD-10-CM

## 2023-08-15 DIAGNOSIS — R26.89 FUNCTIONAL GAIT ABNORMALITY: ICD-10-CM

## 2023-08-15 DIAGNOSIS — M62.81 QUADRICEPS WEAKNESS: ICD-10-CM

## 2023-08-15 DIAGNOSIS — Z91.81 RISK FOR FALLS: ICD-10-CM

## 2023-08-15 DIAGNOSIS — G57.22 FEMORAL NEUROPATHY OF LEFT LOWER EXTREMITY: Primary | ICD-10-CM

## 2023-08-15 LAB — INR PPP: 3.5

## 2023-08-15 PROCEDURE — 97110 THERAPEUTIC EXERCISES: CPT

## 2023-08-15 PROCEDURE — 97530 THERAPEUTIC ACTIVITIES: CPT

## 2023-08-15 NOTE — THERAPY TREATMENT NOTE
Outpatient Physical Therapy Neuro Treatment Note  [facility]     Patient Name: LAURA Sequeira  : 1935  MRN: [id number]  Today's Date: 8/15/2023      Visit Date: 08/15/2023    Visit Dx:    ICD-10-CM ICD-9-CM   1. Femoral neuropathy of left lower extremity  G57.22 355.2   2. Quadriceps weakness  M62.81 728.87   3. Functional gait abnormality  R26.89 781.2   4. Risk for falls  Z91.81 V15.88       Patient Active Problem List   Diagnosis    Atrial fibrillation    Hypertension    Atopic rhinitis    Gastroesophageal reflux disease    Hyperlipidemia    Type 2 diabetes mellitus    Low testosterone    History of aortic valve replacement with metallic valve    Kidney carcinoma    Stage 3b chronic kidney disease    Cerebrovascular accident (CVA) due to embolism of right middle cerebral artery    Iron deficiency anemia    Chronic anticoagulation    Hemiparesis of left nondominant side as late effect of cerebral infarction    Rectus sheath hematoma    Hospital discharge follow-up    Sepsis    Calculus of gallbladder with acute cholecystitis without obstruction    History of Clostridium difficile infection    Aspiration pneumonitis    Hematoma of iliopsoas muscle, left, initial encounter    History of CVA (cerebrovascular accident)    S/P laparoscopic cholecystectomy    Anemia    Hematoma of left iliopsoas muscle    Pneumonia of right lower lobe due to infectious organism    Supratherapeutic INR            PT Neuro       Row Name 08/15/23 1315             Subjective Comments    Subjective Comments Ready to go  -LB         Precautions and Contraindications    Precautions/Limitations fall precautions  -LB      Precautions left quad weakness; dynamic knee brace  -LB         Subjective Pain    Able to rate subjective pain? yes  -LB      Pre-Treatment Pain Level 0  -LB      Post-Treatment Pain Level 0  -LB         Posture/Observations    Posture/Observations Comments Pt came up to unit in transport WC.  Spouse present  during session  -LB         Bed Mobility    Supine-Sit [place] (Bed Mobility) modified independence  -LB      Sit-Supine [place] (Bed Mobility) modified independence  -LB         Transfers    Sit-Stand [place] (Transfers) verbal cues;contact guard  -LB      Stand-Sit [place] (Transfers) verbal cues;contact guard  -LB      Transfers, Sit-Stand-Sit, Assist Device rolling walker  -LB      Comment, (Transfers) Cues for left foot placement with more knee flexion, cues for quad contraction  -LB         Gait/Stairs (Locomotion)    [place] Level (Gait) contact guard  -LB      Assistive Device (Gait) walker, front-wheeled  knee brace  -LB      Distance in Feet (Gait) 15  -LB      Deviations/Abnormal Patterns (Gait) bilateral deviations;base of support, narrow;gait speed decreased;stride length decreased;weight shifting decreased  -LB      Bilateral Gait Deviations forward flexed posture;heel strike decreased;weight shift ability decreased  -LB      Left Sided Gait Deviations decreased knee extension  -LB      Gait Assessment/Intervention Provided manual assist for knee extension on the left in stance phase.  Cues to look up and maintain hip extension  -LB         Knee Orthosis Management    Type (Knee Orthosis) left;trigger lock  -LB      Fabrication Comment (Knee Orthosis) [place] Trigger lock premier orthosis with tension bands  -LB      Functional Design (Knee Orthosis) dynamic orthosis  -LB      Therapeutic Indications (Knee Orthosis) stabilization and support  -LB      Wearing Schedule (Knee Orthosis) wear when out of bed only  -LB                User Key  (r) = Recorded By, (t) = Taken By, (c) = Cosigned By      Initials Name Provider Type    Nalini Grey, PT Physical Therapist                             PT Assessment/Plan       Row Name 08/15/23 0062          PT Assessment    Assessment Comments The patient was able to use his left LE more with coming to stand when educated on foot  placement and manual cues for knee extension.  Discussed importance of increasing knee extension ROM and spouse is helping with performing stretches at home.   Educated to continue to lock the brace for safety concerns for now.  -LB               User Key  (r) = Recorded By, (t) = Taken By, (c) = Cosigned By      Initials Name Provider Type    Nalini Grey PT Physical Therapist                        OP Exercises       Row Name 08/15/23 1534 08/15/23 1315          Subjective Comments    Subjective Comments -- Ready to go  -LB        Subjective Pain    Able to rate subjective pain? -- yes  -LB     Pre-Treatment Pain Level -- 0  -LB     Post-Treatment Pain Level -- 0  -LB        Total Minutes    64648 - PT Therapeutic Exercise Minutes 35  -LB --     93199 - PT Therapeutic Activity Minutes 10  -LB --        Exercise 1    Exercise Name 1 -- SAQs on peanut ball and on smaller bolster  -LB     Sets 1 -- 2  -LB     Reps 1 -- 10  -LB        Exercise 2    Exercise Name 2 -- Bridges with LEs on peanut ball  -LB     Sets 2 -- 2  -LB     Reps 2 -- 10  -LB        Exercise 3    Exercise Name 3 -- supine knee extension stretch  -LB     Time 3 -- 5 mins  -LB     Additional Comments -- 4 # on leg  -LB        Exercise 4    Exercise Name 4 -- SLR  -LB     Reps 4 -- 15  -LB     Additional Comments -- assist for extension  -LB        Exercise 5    Exercise Name 5 -- quad sets  -LB     Reps 5 -- 15  -LB        Exercise 6    Exercise Name 6 -- seated knee extension/flexion  -LB     Reps 6 -- 10  -LB               User Key  (r) = Recorded By, (t) = Taken By, (c) = Cosigned By      Initials Name Provider Type    Nalini Grey PT Physical Therapist                                    Therapy Education  Education Details: Encouraged knee extension stretch at home  Given: HEP  Program: Reinforced, Progressed  How Provided: Verbal, Demonstration  Provided to: Patient, Caregiver  Level of Understanding: Verbalized, Teach back education  performed              Time Calculation:   Start Time: 1259  Stop Time: 1345  Time Calculation (min): 46 min  Timed Charges  13708 - PT Therapeutic Exercise Minutes: 35  68809 - PT Therapeutic Activity Minutes: 10  Total Minutes  Timed Charges Total Minutes: 45   Total Minutes: 45   Therapy Charges for Today       Code Description Service Date Service Provider Modifiers Qty    62512767319  PT THER PROC EA 15 MIN 8/15/2023 Nalini Cano, PT GP 2    [id number]  PT THERAPEUTIC ACT EA 15 MIN 8/15/2023 Nalini Cano, PT GP 1                      Nalini Cano, PT  8/15/2023

## 2023-08-15 NOTE — PROGRESS NOTES
Anticoagulation Clinic Progress Note    Anticoagulation Summary  As of 8/15/2023      INR goal:  3.0-3.5   TTR:  68.3 % (4.6 y)   INR used for dosing:  3.50 (8/15/2023)   Warfarin maintenance plan:  5 mg every day   Weekly warfarin total:  35 mg   No change documented:  Carmen El PharmD   Plan last modified:  Carmen El PharmD (2/24/2023)   Next INR check:  8/25/2023   Priority:  High   Target end date:  Indefinite    Indications    History of aortic valve replacement with metallic valve [Z95.4]  Atrial fibrillation [I48.91]  Cerebrovascular accident (CVA) due to 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]                 Anticoagulation Episode Summary       INR check location:      Preferred lab:      Send INR reminders to:  [facility] HOME TEST POOL    Comments:  **Home testing training 3/3/23** *CALL EVERY TIME* New INR goal 3 - 3.5 (see 1/2020 hospitalization for CVA)          Anticoagulation Care Providers       Provider Role Specialty Phone number    Griffin Fried MD Referring Cardiology [phone]            Clinic Interview:  Patient Findings     Negatives:  Signs/symptoms of thrombosis, Signs/symptoms of bleeding,   Laboratory test error suspected, Change in health, Change in alcohol use,   Change in activity, Upcoming invasive procedure, Emergency department   visit, Upcoming dental procedure, Missed doses, Extra doses, Change in   medications, Change in diet/appetite, Hospital admission, Bruising, Other   complaints      Clinical Outcomes     Negatives:  Major bleeding event, Thromboembolic event,   Anticoagulation-related hospital admission, Anticoagulation-related ED   visit, Anticoagulation-related fatality        INR History:      7/28/2023     7:57 AM 8/4/2023    12:00 AM 8/4/2023     8:56 AM 8/11/2023    12:00 AM 8/11/2023     8:02 AM 8/15/2023    12:00 AM 8/15/2023    10:00 AM   Anticoagulation Monitoring   INR 3.80  3.60  2.20  3.50   INR Date 7/28/2023 8/4/2023   8/11/2023  8/15/2023   INR Goal 3.0-3.5  3.0-3.5  3.0-3.5  3.0-3.5   Trend Same  Same  Same  Same   Last Week Total 35 mg  35 mg  30 mg  35 mg   Next Week Total 35 mg  35 mg  40 mg  35 mg   Sun 5 mg  5 mg  5 mg  5 mg   Mon 5 mg  5 mg  5 mg  5 mg   Tue 5 mg  5 mg  -  5 mg   Wed 5 mg  5 mg  -  5 mg   Thu 5 mg  5 mg  -  5 mg   Fri 5 mg  5 mg  10 mg (8/11)  5 mg   Sat 5 mg  5 mg  5 mg  5 mg   Historical INR  3.60      2.20      3.50            This result is from an external source.       Plan:  1. INR is Therapeutic today- see above in Anticoagulation Summary.   Will instruct LAURA Sequeira to Continue their warfarin regimen- see above in Anticoagulation Summary.  2. Follow up in 1 weeks  3. They have been instructed to call if any changes in medications, doses, concerns, etc. Patient expresses understanding and has no further questions at this time.    Carmen El, PharmD

## 2023-08-16 RX ORDER — FAMOTIDINE 20 MG/1
20 TABLET, FILM COATED ORAL DAILY
Qty: 90 TABLET | Refills: 1 | Status: SHIPPED | OUTPATIENT
Start: 2023-08-16

## 2023-08-17 ENCOUNTER — HOSPITAL ENCOUNTER (OUTPATIENT)
Dept: PHYSICAL THERAPY | Facility: HOSPITAL | Age: 88
Setting detail: THERAPIES SERIES
Discharge: HOME | End: 2023-08-17
Payer: MEDICARE

## 2023-08-17 DIAGNOSIS — M62.81 QUADRICEPS WEAKNESS: ICD-10-CM

## 2023-08-17 DIAGNOSIS — R26.89 FUNCTIONAL GAIT ABNORMALITY: ICD-10-CM

## 2023-08-17 DIAGNOSIS — G57.22 FEMORAL NEUROPATHY OF LEFT LOWER EXTREMITY: Primary | ICD-10-CM

## 2023-08-17 PROCEDURE — 97110 THERAPEUTIC EXERCISES: CPT

## 2023-08-17 PROCEDURE — 97530 THERAPEUTIC ACTIVITIES: CPT

## 2023-08-17 NOTE — THERAPY TREATMENT NOTE
Outpatient Physical Therapy Neuro Treatment Note  [facility]     Patient Name: LAURA Sequeira  : 1935  MRN: [id number]  Today's Date: 2023      Visit Date: 2023    Visit Dx:    ICD-10-CM ICD-9-CM   1. Femoral neuropathy of left lower extremity  G57.22 355.2   2. Quadriceps weakness  M62.81 728.87   3. Functional gait abnormality  R26.89 781.2       Patient Active Problem List   Diagnosis    Atrial fibrillation    Hypertension    Atopic rhinitis    Gastroesophageal reflux disease    Hyperlipidemia    Type 2 diabetes mellitus    Low testosterone    History of aortic valve replacement with metallic valve    Kidney carcinoma    Stage 3b chronic kidney disease    Cerebrovascular accident (CVA) due to embolism of right middle cerebral artery    Iron deficiency anemia    Chronic anticoagulation    Hemiparesis of left nondominant side as late effect of cerebral infarction    Rectus sheath hematoma    Hospital discharge follow-up    Sepsis    Calculus of gallbladder with acute cholecystitis without obstruction    History of Clostridium difficile infection    Aspiration pneumonitis    Hematoma of iliopsoas muscle, left, initial encounter    History of CVA (cerebrovascular accident)    S/P laparoscopic cholecystectomy    Anemia    Hematoma of left iliopsoas muscle    Pneumonia of right lower lobe due to infectious organism    Supratherapeutic INR            PT Neuro       Row Name 23 1353 23 1345          Subjective Comments    Subjective Comments Doing some exercises  -LB --        Precautions and Contraindications    Precautions/Limitations fall precautions  -LB --     Precautions left quad weakness; dynamic knee brace  -LB --        Subjective Pain    Able to rate subjective pain? yes  -LB --     Pre-Treatment Pain Level 0  -LB --     Post-Treatment Pain Level 0  -LB --        Posture/Observations    Posture/Observations Comments Pt came up to unit in transport WC.  Spouse present  during session  -LB --        Transfers    Sit-Stand [place] (Transfers) -- verbal cues;contact guard;minimum assist (75% patient effort)  -LB     Stand-Sit [place] (Transfers) -- verbal cues;contact guard;minimum assist (75% patient effort)  -LB     Transfers, Sit-Stand-Sit, Assist Device -- rolling walker  -LB     Comment, (Transfers) -- Pt with decreased forward weight shift, cues for hand placement  -LB        Gait/Stairs (Locomotion)    [place] Level (Gait) -- minimum assist (75% patient effort)  -LB     Assistive Device (Gait) -- walker, front-wheeled  no brace  -LB     Distance in Feet (Gait) -- 25  -LB     Pattern (Gait) -- step-to  -LB     Deviations/Abnormal Patterns (Gait) -- bilateral deviations;base of support, narrow;gait speed decreased;stride length decreased;weight shifting decreased  increased weight bearing through UEs  -LB     Bilateral Gait Deviations -- forward flexed posture;heel strike decreased;weight shift ability decreased  -LB     Left Sided Gait Deviations -- decreased knee extension  -LB     Gait Assessment/Intervention -- Ambulated without brace with manual cues for left quad engagement in stance with manual cues provided at knee and hip, cues for upright posture  -LB        Balance Skills Training    Standing-Level of Assistance -- Contact guard;Minimum assistance  -LB     Static Standing Balance Support -- assistive device  -LB     Standing Balance # of Minutes -- Stood with manyal cues for upright and engaging knee and hip extension.  -LB        Knee Orthosis Management    Type (Knee Orthosis) -- left  -LB     Fabrication Comment (Knee Orthosis) -- [place] Trigger lock premier orthosis with tension bands  -LB     Functional Design (Knee Orthosis) -- dynamic orthosis  -LB     Therapeutic Indications (Knee Orthosis) -- stabilization and support  -LB     Wearing Schedule (Knee Orthosis) -- wear when out of bed only;wear with activity/work  -LB               User Key   (r) = Recorded By, (t) = Taken By, (c) = Cosigned By      Initials Name Provider Type    Nalini Grey PT Physical Therapist                             PT Assessment/Plan       Row Name 08/17/23 1359          PT Assessment    Assessment Comments The patient was able to perform eccentric control of left knee with LAQs.  The patient does have more strength in quad but difficult to engage knee during functional activities with sit to stand and during gait.  Encouraged more stretching for optimal muscle contraction  -LB               User Key  (r) = Recorded By, (t) = Taken By, (c) = Cosigned By      Initials Name Provider Type    Nalini Grey, PT Physical Therapist                        OP Exercises       Row Name 08/17/23 1535 08/17/23 1353          Subjective Comments    Subjective Comments -- Doing some exercises  -LB        Subjective Pain    Able to rate subjective pain? -- yes  -LB     Pre-Treatment Pain Level -- 0  -LB     Post-Treatment Pain Level -- 0  -LB        Total Minutes    57662 - PT Therapeutic Exercise Minutes 35  -LB --     30866 - PT Therapeutic Activity Minutes 10  -LB --        Exercise 1    Exercise Name 1 -- SAQs on peanut ball and on smaller bolster  -LB     Reps 1 -- 15  -LB        Exercise 2    Exercise Name 2 -- Bridges with LEs on peanut ball  -LB     Reps 2 -- 15  -LB        Exercise 3    Exercise Name 3 -- supine knee extension stretch  -LB     Time 3 -- 5 mins  -LB     Additional Comments -- 4# on  knee  -LB        Exercise 4    Exercise Name 4 -- SLR  -LB     Reps 4 -- 15  -LB     Additional Comments -- with A  -LB        Exercise 5    Exercise Name 5 -- quad sets  -LB     Sets 5 -- 15  -LB        Exercise 6    Exercise Name 6 -- seated knee extension/flexion  -LB     Reps 6 -- 15  -LB     Additional Comments -- with A  -LB               User Key  (r) = Recorded By, (t) = Taken By, (c) = Cosigned By      Initials Name Provider Type    Nalini Grey, CICI Physical Therapist                                     Therapy Education  Education Details: stretching  Given: HEP  Program: Reinforced, Progressed  How Provided: Verbal, Demonstration  Provided to: Patient, Caregiver  Level of Understanding: Verbalized              Time Calculation:   Start Time: 1257  Stop Time: 1345  Time Calculation (min): 48 min  Timed Charges  52182 - PT Therapeutic Exercise Minutes: 35  85729 - PT Therapeutic Activity Minutes: 10  Total Minutes  Timed Charges Total Minutes: 45   Total Minutes: 45   Therapy Charges for Today       Code Description Service Date Service Provider Modifiers Qty    15595654963  PT THER PROC EA 15 MIN 8/17/2023 Nalini Cano, PT GP 2    [id number]  PT THERAPEUTIC ACT EA 15 MIN 8/17/2023 Nalini Cano, PT GP 1                      Nalini Cano, PT  8/17/2023

## 2023-08-22 ENCOUNTER — HOSPITAL ENCOUNTER (OUTPATIENT)
Dept: PHYSICAL THERAPY | Facility: HOSPITAL | Age: 88
Setting detail: THERAPIES SERIES
Discharge: HOME | End: 2023-08-22
Payer: COMMERCIAL

## 2023-08-22 DIAGNOSIS — M62.81 QUADRICEPS WEAKNESS: ICD-10-CM

## 2023-08-22 DIAGNOSIS — R26.89 FUNCTIONAL GAIT ABNORMALITY: ICD-10-CM

## 2023-08-22 DIAGNOSIS — Z91.81 RISK FOR FALLS: ICD-10-CM

## 2023-08-22 DIAGNOSIS — G57.22 FEMORAL NEUROPATHY OF LEFT LOWER EXTREMITY: Primary | ICD-10-CM

## 2023-08-22 PROCEDURE — 97110 THERAPEUTIC EXERCISES: CPT

## 2023-08-22 PROCEDURE — 97116 GAIT TRAINING THERAPY: CPT

## 2023-08-22 PROCEDURE — 97530 THERAPEUTIC ACTIVITIES: CPT

## 2023-08-22 NOTE — THERAPY TREATMENT NOTE
Outpatient Physical Therapy Neuro Treatment Note  [facility]     Patient Name: LAURA Sequeira  : 1935  MRN: [id number]  Today's Date: 2023      Visit Date: 2023    Visit Dx:    ICD-10-CM ICD-9-CM   1. Femoral neuropathy of left lower extremity  G57.22 355.2   2. Quadriceps weakness  M62.81 728.87   3. Functional gait abnormality  R26.89 781.2   4. Risk for falls  Z91.81 V15.88       Patient Active Problem List   Diagnosis    Atrial fibrillation    Hypertension    Atopic rhinitis    Gastroesophageal reflux disease    Hyperlipidemia    Type 2 diabetes mellitus    Low testosterone    History of aortic valve replacement with metallic valve    Kidney carcinoma    Stage 3b chronic kidney disease    Cerebrovascular accident (CVA) due to embolism of right middle cerebral artery    Iron deficiency anemia    Chronic anticoagulation    Hemiparesis of left nondominant side as late effect of cerebral infarction    Rectus sheath hematoma    Hospital discharge follow-up    Sepsis    Calculus of gallbladder with acute cholecystitis without obstruction    History of Clostridium difficile infection    Aspiration pneumonitis    Hematoma of iliopsoas muscle, left, initial encounter    History of CVA (cerebrovascular accident)    S/P laparoscopic cholecystectomy    Anemia    Hematoma of left iliopsoas muscle    Pneumonia of right lower lobe due to infectious organism    Supratherapeutic INR            PT Neuro       Row Name 23 2782             Subjective Comments    Subjective Comments Spouse wants to know if the patient can walk to the bathroom with her helping  -LB         Precautions and Contraindications    Precautions/Limitations fall precautions  -LB      Precautions left quad weakness; dynamic knee brace  -LB      Contraindications Needs to wear dynamic knee brace on left  -LB         Subjective Pain    Able to rate subjective pain? yes  -LB      Pre-Treatment Pain Level 0  -LB       Post-Treatment Pain Level 0  -LB         Cognition    Overall Cognitive Status Impaired  -LB         Posture/Observations    Posture/Observations Comments Pt came up to unit in transport WC.  Spouse present during session  -LB         Transfers    Sit-Stand [place] (Transfers) verbal cues;contact guard;minimum assist (75% patient effort)  -LB      Stand-Sit [place] (Transfers) verbal cues;contact guard;minimum assist (75% patient effort)  -LB      Transfers, Sit-Stand-Sit, Assist Device rolling walker  -LB      Comment, (Transfers) cues for feet placement for improved initial standing balance.  Practiced sit to stand with scooting, feet placement and leaning forward.  Peformed at least 6 times during session  -LB         Gait/Stairs (Locomotion)    [place] Level (Gait) contact guard  -LB      Assistive Device (Gait) walker, front-wheeled  knee brace; cues to maintain hip extension  -LB      Distance in Feet (Gait) 40 x 4  -LB      Pattern (Gait) step-to  -LB      Deviations/Abnormal Patterns (Gait) bilateral deviations;base of support, narrow;gait speed decreased;stride length decreased;weight shifting decreased  -LB      Bilateral Gait Deviations forward flexed posture;heel strike decreased;weight shift ability decreased  -LB      Left Sided Gait Deviations decreased knee extension  -LB      Gait Assessment/Intervention Ambulated without the knee brace and Min A, manual cues for knee and hip extension.  Knee buckled times 2  -LB         Balance Skills Training    Standing-Level of Assistance Contact guard;Minimum assistance  -LB      Static Standing Balance Support assistive device  -LB      Standing Balance # of Minutes Cues to extend knee and hip in stance  -LB                User Key  (r) = Recorded By, (t) = Taken By, (c) = Cosigned By      Initials Name Provider Type    Nalini Grey PT Physical Therapist                             PT Assessment/Plan       Row Name 08/22/23 7285          PT  Assessment    Assessment Comments With attempts to walk without the brace, the knee elizabeth and is not safe.  Working on increasing base of support wider.  Stressed the importance of foot placement and weight shift forward with coming up to stand, and the patient has improved initial standing balance but he does not carry over without cues.  Wife is concerned as she feels the patient does not listen to her well at home.  -LB               User Key  (r) = Recorded By, (t) = Taken By, (c) = Cosigned By      Initials Name Provider Type    Nalini Grey PT Physical Therapist                        OP Exercises       Row Name 08/22/23 1511 08/22/23 6594          Subjective Comments    Subjective Comments -- Spouse wants to know if the patient can walk to the bathroom with her helping  -LB        Subjective Pain    Able to rate subjective pain? -- yes  -LB     Pre-Treatment Pain Level -- 0  -LB     Post-Treatment Pain Level -- 0  -LB        Total Minutes    89741 - Gait Training Minutes  10  -LB --     33381 - PT Therapeutic Exercise Minutes 10  -LB --     80401 - PT Therapeutic Activity Minutes 20  -LB --               User Key  (r) = Recorded By, (t) = Taken By, (c) = Cosigned By      Initials Name Provider Type    Nalini Grey PT Physical Therapist                                    Therapy Education  Education Details: Spouse questioned if the patient can ambulate to the bathroom at home with brace, FWW and her assisting,. agreed with the plan of walking to the bathroom with assistance  Given: Mobility training  Program: New, Reinforced  How Provided: Verbal, Demonstration  Provided to: Patient  Level of Understanding: Verbalized, Teach back education performed              Time Calculation:   Start Time: 1300  Stop Time: 1345  Time Calculation (min): 45 min  Timed Charges  43964 - PT Therapeutic Exercise Minutes: 10  89580 - Gait Training Minutes : 10  94729 - PT Therapeutic Activity Minutes: 20  Total  Minutes  Timed Charges Total Minutes: 40   Total Minutes: 40   Therapy Charges for Today       Code Description Service Date Service Provider Modifiers Qty    [id number]  PT THER PROC EA 15 MIN 8/22/2023 Nalini Cano, PT GP 1    [id number]  GAIT TRAINING EA 15 MIN 8/22/2023 Nalini Cano, PT GP 1    [id number]  PT THERAPEUTIC ACT EA 15 MIN 8/22/2023 Nalini Cano, PT GP 1                      Nalini Cano, PT  8/22/2023

## 2023-08-24 ENCOUNTER — HOSPITAL ENCOUNTER (OUTPATIENT)
Dept: PHYSICAL THERAPY | Facility: HOSPITAL | Age: 88
Setting detail: THERAPIES SERIES
Discharge: HOME | End: 2023-08-24
Payer: COMMERCIAL

## 2023-08-24 DIAGNOSIS — M62.81 QUADRICEPS WEAKNESS: ICD-10-CM

## 2023-08-24 DIAGNOSIS — R26.89 FUNCTIONAL GAIT ABNORMALITY: ICD-10-CM

## 2023-08-24 DIAGNOSIS — Z91.81 RISK FOR FALLS: ICD-10-CM

## 2023-08-24 DIAGNOSIS — G57.22 FEMORAL NEUROPATHY OF LEFT LOWER EXTREMITY: Primary | ICD-10-CM

## 2023-08-24 PROCEDURE — 97110 THERAPEUTIC EXERCISES: CPT

## 2023-08-24 PROCEDURE — 97530 THERAPEUTIC ACTIVITIES: CPT

## 2023-08-24 NOTE — THERAPY TREATMENT NOTE
Outpatient Physical Therapy Neuro Treatment Note  [facility]     Patient Name: LAURA Sequeira  : 1935  MRN: [id number]  Today's Date: 2023      Visit Date: 2023    Visit Dx:    ICD-10-CM ICD-9-CM   1. Femoral neuropathy of left lower extremity  G57.22 355.2   2. Quadriceps weakness  M62.81 728.87   3. Functional gait abnormality  R26.89 781.2   4. Risk for falls  Z91.81 V15.88       Patient Active Problem List   Diagnosis    Atrial fibrillation    Hypertension    Atopic rhinitis    Gastroesophageal reflux disease    Hyperlipidemia    Type 2 diabetes mellitus    Low testosterone    History of aortic valve replacement with metallic valve    Kidney carcinoma    Stage 3b chronic kidney disease    Cerebrovascular accident (CVA) due to embolism of right middle cerebral artery    Iron deficiency anemia    Chronic anticoagulation    Hemiparesis of left nondominant side as late effect of cerebral infarction    Rectus sheath hematoma    Hospital discharge follow-up    Sepsis    Calculus of gallbladder with acute cholecystitis without obstruction    History of Clostridium difficile infection    Aspiration pneumonitis    Hematoma of iliopsoas muscle, left, initial encounter    History of CVA (cerebrovascular accident)    S/P laparoscopic cholecystectomy    Anemia    Hematoma of left iliopsoas muscle    Pneumonia of right lower lobe due to infectious organism    Supratherapeutic INR            PT Neuro       Row Name 23 1314             Subjective Comments    Subjective Comments I did 20 if my exercises earlier  -LB         Precautions and Contraindications    Precautions/Limitations fall precautions  -LB      Precautions left quad weakness; dynamic knee brace  -LB      Contraindications Needs to wear dynamic knee brace on left  -LB         Subjective Pain    Able to rate subjective pain? yes  -LB      Pre-Treatment Pain Level 0  -LB      Post-Treatment Pain Level 0  -LB          Posture/Observations    Posture/Observations Comments Pt came up to unit in transport WC.  Spouse present during session  -LB         Transfers    Sit-Stand [place] (Transfers) standby assist  -LB      Stand-Sit [place] (Transfers) standby assist  -LB      Transfers, Sit-Stand-Sit, Assist Device rolling walker  knee brace  -LB         Gait/Stairs (Locomotion)    [place] Level (Gait) contact guard  -LB      Assistive Device (Gait) walker, front-wheeled  knee brace  -LB      Distance in Feet (Gait) 30; 50  -LB      Pattern (Gait) step-to  -LB      Deviations/Abnormal Patterns (Gait) bilateral deviations;base of support, narrow;gait speed decreased;stride length decreased;weight shifting decreased  -LB      Bilateral Gait Deviations forward flexed posture;heel strike decreased;weight shift ability decreased  -LB      Left Sided Gait Deviations decreased knee extension  decreased weight bearing on left  -LB      Right Sided Gait Deviations --  quikcker step with right  -LB      Gait Assessment/Intervention Manual cues at shoulders and hip extensors.  Verbal cues to keep shoulders back worked the best  -LB         Knee Orthosis Management    Type (Knee Orthosis) left  -LB      Fabrication Comment (Knee Orthosis) [place] Trigger lock premier orthosis with tension bands  -LB      Functional Design (Knee Orthosis) dynamic orthosis  -LB      Therapeutic Indications (Knee Orthosis) stabilization and support  -LB                User Key  (r) = Recorded By, (t) = Taken By, (c) = Cosigned By      Initials Name Provider Type    Nalini Grey PT Physical Therapist                             PT Assessment/Plan       Row Name 08/24/23 1024          PT Assessment    Assessment Comments With walking acivities the patient was able to maintain more hip and knee extension in stance.  Without any cueing the patient was able to perform 3 sit to stands with good feet placement and correct hand placment. Trying to  increase gait pattern with more upright posture and weight bearing on left LE.  Respoinded better to the vc to keep shoulders back.  Pt tolerating stretching of knee well during session and at home per report  -LB               User Key  (r) = Recorded By, (t) = Taken By, (c) = Cosigned By      Initials Name Provider Type    Nalini Grey, PT Physical Therapist                        OP Exercises       Row Name 08/24/23 1620 08/24/23 1314          Subjective Comments    Subjective Comments -- I did 20 if my exercises earlier  -LB        Subjective Pain    Able to rate subjective pain? -- yes  -LB     Pre-Treatment Pain Level -- 0  -LB     Post-Treatment Pain Level -- 0  -LB        Total Minutes    32177 - PT Therapeutic Exercise Minutes 27  -LB --     43720 - PT Therapeutic Activity Minutes 13  -LB --        Exercise 1    Exercise Name 1 -- SAQs on peanut ball and on smaller bolster  -LB     Reps 1 -- 15  -LB        Exercise 2    Exercise Name 2 -- bridges  -LB     Reps 2 -- 15  -LB        Exercise 3    Exercise Name 3 -- supine knee extension stretch  -LB     Time 3 -- 5 mins  -LB     Additional Comments -- 5# on knee  -LB        Exercise 4    Exercise Name 4 -- SLR  -LB     Reps 4 -- 10  -LB     Additional Comments -- with A, cues to lock knee  -LB        Exercise 5    Exercise Name 5 -- quad sets  -LB     Reps 5 -- 15  -LB        Exercise 6    Exercise Name 6 -- seated knee extension/flexion  -LB     Reps 6 -- 10  -LB     Additional Comments -- cues for eccentric control  -LB        Exercise 9    Exercise Name 9 -- LAQs  -LB     Reps 9 -- 10  -LB        Exercise 12    Exercise Name 12 -- sit to stand  -LB     Cueing 12 -- Verbal;Tactile  -LB     Reps 12 -- 5  -LB               User Key  (r) = Recorded By, (t) = Taken By, (c) = Cosigned By      Initials Name Provider Type    Nalini Grey PT Physical Therapist                                                   Time Calculation:   Start Time: 1301  Stop Time:  1344  Time Calculation (min): 43 min  Timed Charges  29266 - PT Therapeutic Exercise Minutes: 27  13975 - PT Therapeutic Activity Minutes: 13  Total Minutes  Timed Charges Total Minutes: 40   Total Minutes: 40   Therapy Charges for Today       Code Description Service Date Service Provider Modifiers Qty    [id number]  PT THER PROC EA 15 MIN 8/24/2023 Nalini Cano, PT GP 2    [id number]  PT THERAPEUTIC ACT EA 15 MIN 8/24/2023 Nalini Cano, PT GP 1                      Nalini Cano, PT  8/24/2023

## 2023-08-25 ENCOUNTER — ANTICOAGULATION VISIT (OUTPATIENT)
Dept: PHARMACY | Facility: HOSPITAL | Age: 88
End: 2023-08-25
Payer: COMMERCIAL

## 2023-08-25 DIAGNOSIS — Z95.4 HISTORY OF AORTIC VALVE REPLACEMENT WITH METALLIC VALVE: Primary | ICD-10-CM

## 2023-08-25 DIAGNOSIS — I63.411 CEREBROVASCULAR ACCIDENT (CVA) DUE TO EMBOLISM OF RIGHT MIDDLE CEREBRAL ARTERY: ICD-10-CM

## 2023-08-25 LAB — INR PPP: 4

## 2023-08-25 PROCEDURE — G0249 PROVIDE INR TEST MATER/EQUIP: HCPCS

## 2023-08-25 NOTE — PROGRESS NOTES
Anticoagulation Clinic Progress Note    Anticoagulation Summary  As of 2023      INR goal:  3.0-3.5   TTR:  67.9 % (4.6 y)   INR used for dosin.00 (2023)   Warfarin maintenance plan:  5 mg every day   Weekly warfarin total:  35 mg   Plan last modified:  Carmen El, PharmD (2023)   Next INR check:  2023   Priority:  High   Target end date:  Indefinite    Indications    History of aortic valve replacement with metallic valve [Z95.4]  Atrial fibrillation [I48.91]  Cerebrovascular accident (CVA) due to 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]                 Anticoagulation Episode Summary       INR check location:      Preferred lab:      Send INR reminders to:  [facility]    Comments:  **Home testing training 3/3/23** *CALL EVERY TIME* New INR goal 3 - 3.5 (see 2020 hospitalization for CVA)          Anticoagulation Care Providers       Provider Role Specialty Phone number    Griffin Fried MD Referring Cardiology [phone]            Clinic Interview:  Patient Findings     Positives:  Change in alcohol use    Negatives:  Signs/symptoms of thrombosis, Signs/symptoms of bleeding,   Laboratory test error suspected, Change in health, Change in activity,   Upcoming invasive procedure, Emergency department visit, Upcoming dental   procedure, Missed doses, Extra doses, Change in medications, Change in   diet/appetite, Hospital admission, Bruising, Other complaints    Comments:  1 beer 2 days ago.      Clinical Outcomes     Negatives:  Major bleeding event, Thromboembolic event,   Anticoagulation-related hospital admission, Anticoagulation-related ED   visit, Anticoagulation-related fatality    Comments:  1 beer 2 days ago.        INR History:      2023     8:56 AM 2023    12:00 AM 2023     8:02 AM 8/15/2023    12:00 AM 8/15/2023    10:00 AM 2023    12:00 AM 2023     8:00 AM   Anticoagulation Monitoring   INR 3.60  2.20  3.50  4.00    INR Date 8/4/2023  8/11/2023  8/15/2023  8/25/2023   INR Goal 3.0-3.5  3.0-3.5  3.0-3.5  3.0-3.5   Trend Same  Same  Same  Same   Last Week Total 35 mg  30 mg  35 mg  35 mg   Next Week Total 35 mg  40 mg  35 mg  32.5 mg   Sun 5 mg  5 mg  5 mg  5 mg   Mon 5 mg  5 mg  5 mg  5 mg   Tue 5 mg  -  5 mg  5 mg   Wed 5 mg  -  5 mg  5 mg   Thu 5 mg  -  5 mg  5 mg   Fri 5 mg  10 mg (8/11)  5 mg  2.5 mg (8/25)   Sat 5 mg  5 mg  5 mg  5 mg   Historical INR  2.20      3.50      4.00            This result is from an external source.       Plan:  1. INR is Supratherapeutic today- see above in Anticoagulation Summary.   Will instruct LAURA Sequeira to Change their warfarin regimen (2.5 mg today, then resume 5 mg daily) - see above in Anticoagulation Summary.  2. Follow up in 1 week.  3. They have been instructed to call if any changes in medications, doses, concerns, etc. Patient expresses understanding and has no further questions at this time.    Vasquez Niño, PharmD

## 2023-08-28 RX ORDER — WARFARIN SODIUM 5 MG/1
TABLET ORAL
Qty: 90 TABLET | Refills: 1 | Status: SHIPPED | OUTPATIENT
Start: 2023-08-28

## 2023-08-29 ENCOUNTER — HOSPITAL ENCOUNTER (OUTPATIENT)
Dept: PHYSICAL THERAPY | Facility: HOSPITAL | Age: 88
Setting detail: THERAPIES SERIES
Discharge: HOME | End: 2023-08-29
Payer: COMMERCIAL

## 2023-08-29 DIAGNOSIS — G57.22 FEMORAL NEUROPATHY OF LEFT LOWER EXTREMITY: Primary | ICD-10-CM

## 2023-08-29 DIAGNOSIS — Z91.81 RISK FOR FALLS: ICD-10-CM

## 2023-08-29 DIAGNOSIS — R26.89 FUNCTIONAL GAIT ABNORMALITY: ICD-10-CM

## 2023-08-29 DIAGNOSIS — M62.81 QUADRICEPS WEAKNESS: ICD-10-CM

## 2023-08-29 PROCEDURE — 97530 THERAPEUTIC ACTIVITIES: CPT

## 2023-08-29 PROCEDURE — 97110 THERAPEUTIC EXERCISES: CPT

## 2023-08-29 NOTE — THERAPY TREATMENT NOTE
Outpatient Physical Therapy Neuro Treatment Note  [facility]     Patient Name: LAURA Sequeira  : 1935  MRN: [id number]  Today's Date: 2023      Visit Date: 2023    Visit Dx:    ICD-10-CM ICD-9-CM   1. Femoral neuropathy of left lower extremity  G57.22 355.2   2. Quadriceps weakness  M62.81 728.87   3. Functional gait abnormality  R26.89 781.2   4. Risk for falls  Z91.81 V15.88       Patient Active Problem List   Diagnosis    Atrial fibrillation    Hypertension    Atopic rhinitis    Gastroesophageal reflux disease    Hyperlipidemia    Type 2 diabetes mellitus    Low testosterone    History of aortic valve replacement with metallic valve    Kidney carcinoma    Stage 3b chronic kidney disease    Cerebrovascular accident (CVA) due to embolism of right middle cerebral artery    Iron deficiency anemia    Chronic anticoagulation    Hemiparesis of left nondominant side as late effect of cerebral infarction    Rectus sheath hematoma    Hospital discharge follow-up    Sepsis    Calculus of gallbladder with acute cholecystitis without obstruction    History of Clostridium difficile infection    Aspiration pneumonitis    Hematoma of iliopsoas muscle, left, initial encounter    History of CVA (cerebrovascular accident)    S/P laparoscopic cholecystectomy    Anemia    Hematoma of left iliopsoas muscle    Pneumonia of right lower lobe due to infectious organism    Supratherapeutic INR            PT Neuro       Row Name 23 1323             Subjective Comments    Subjective Comments I feel weak today  -LB         Precautions and Contraindications    Precautions/Limitations fall precautions;brace on when up  -LB      Precautions left quad weakness; dynamic knee brace  -LB         Subjective Pain    Able to rate subjective pain? yes  -LB      Pre-Treatment Pain Level 0  -LB      Post-Treatment Pain Level 0  -LB         Cognition    Overall Cognitive Status Impaired  -LB      Safety Judgment Decreased  awareness of need for assistance  -LB      Deficits Decreased awareness of deficits  -LB         Posture/Observations    Posture/Observations Comments Pt came up to unit in transport WC.  Spouse present during session  -LB         Transfers    Sit-Stand [place] (Transfers) verbal cues;contact guard;standby assist  -LB      Stand-Sit [place] (Transfers) verbal cues;contact guard;standby assist  -LB      Transfers, Sit-Stand-Sit, Assist Device rolling walker  -LB      Comment, (Transfers) cues to scoot, lean forward  -LB         Gait/Stairs (Locomotion)    [place] Level (Gait) contact guard  knee brace  -LB      Assistive Device (Gait) walker, front-wheeled  -LB      Distance in Feet (Gait) 30; 60  -LB      Pattern (Gait) step-to  -LB      Deviations/Abnormal Patterns (Gait) bilateral deviations;base of support, narrow;gait speed decreased;stride length decreased;weight shifting decreased  -LB      Bilateral Gait Deviations forward flexed posture;heel strike decreased;weight shift ability decreased  -LB      Left Sided Gait Deviations decreased knee extension  -LB      Right Sided Gait Deviations --  quicker step with the right  -LB      Gait Assessment/Intervention VC and [place] cues for hip extension, shoulders back.  Cues to increase base of support  -LB         Balance Skills Training    Standing-Level of Assistance Contact guard;Close supervision  -LB      Static Standing Balance Support assistive device  -LB      Standing Balance # of Minutes Cues to extend knee and hip while standing  -LB                User Key  (r) = Recorded By, (t) = Taken By, (c) = Cosigned By      Initials Name Provider Type    LB Nalini Cano, PT Physical Therapist                             PT Assessment/Plan       Row Name 08/29/23 9400          PT Assessment    Assessment Comments The patient needed more assist with the first sit to stand, pt reporting feeling weaker and spouse did feel he was shakey earlier.  Spouse  did test BS which were okay.  Contrating on hip and knee strengthening as well as knee extension to use available strength.  Trying to incorporate the increased strength into functional activities such as using LEs more with coming up to stand and rely less on UEs to complete the transition.  Spouse reports pt working on quality of the exercises at home with a slower more controlled speed.  -LB               User Key  (r) = Recorded By, (t) = Taken By, (c) = Cosigned By      Initials Name Provider Type    Nalini Grey PT Physical Therapist                        OP Exercises       Row Name 08/29/23 1352 08/29/23 1323          Subjective Comments    Subjective Comments -- I feel weak today  -LB        Subjective Pain    Able to rate subjective pain? -- yes  -LB     Pre-Treatment Pain Level -- 0  -LB     Post-Treatment Pain Level -- 0  -LB        Total Minutes    34905 - PT Therapeutic Exercise Minutes 30  -LB --     73833 - PT Therapeutic Activity Minutes 15  -LB --        Exercise 1    Exercise Name 1 -- SAQs on peanut ball and on smaller bolster  -LB     Reps 1 -- 15  -LB        Exercise 3    Exercise Name 3 -- supine knee extension stretch  -LB     Time 3 -- 5 mins  -LB     Additional Comments -- 8# on knee  -LB        Exercise 4    Exercise Name 4 -- SLR  -LB     Reps 4 -- 10  -LB        Exercise 5    Exercise Name 5 -- quad sets  -LB     Reps 5 -- 15  -LB        Exercise 6    Exercise Name 6 -- seated knee extension/flexion  -LB     Reps 6 -- 15  -LB     Additional Comments -- with assist for extension  -LB        Exercise 7    Exercise Name 7 -- SKTC  -LB     Reps 7 -- 10  -LB               User Key  (r) = Recorded By, (t) = Taken By, (c) = Cosigned By      Initials Name Provider Type    Nalini Grey PT Physical Therapist                                    Therapy Education  Education Details: STS  Given: Mobility training  Program: Reinforced, Progressed  How Provided: Verbal  Provided to:  Patient  Level of Understanding: Verbalized              Time Calculation:   Start Time: 1258  Stop Time: 1345  Time Calculation (min): 47 min  Timed Charges  48174 - PT Therapeutic Exercise Minutes: 30  63724 - PT Therapeutic Activity Minutes: 15  Total Minutes  Timed Charges Total Minutes: 45   Total Minutes: 45   Therapy Charges for Today       Code Description Service Date Service Provider Modifiers Qty    42072894300  PT THER PROC EA 15 MIN 8/29/2023 Nalini Cano, PT GP 2    [id number]  PT THERAPEUTIC ACT EA 15 MIN 8/29/2023 Nalini Cano, PT GP 1                      Nalini Cano PT  8/29/2023

## 2023-08-31 ENCOUNTER — HOSPITAL ENCOUNTER (OUTPATIENT)
Dept: PHYSICAL THERAPY | Facility: HOSPITAL | Age: 88
Setting detail: THERAPIES SERIES
Discharge: HOME | End: 2023-08-31
Payer: MEDICARE

## 2023-08-31 DIAGNOSIS — M62.81 QUADRICEPS WEAKNESS: ICD-10-CM

## 2023-08-31 DIAGNOSIS — Z91.81 RISK FOR FALLS: ICD-10-CM

## 2023-08-31 DIAGNOSIS — R26.89 FUNCTIONAL GAIT ABNORMALITY: ICD-10-CM

## 2023-08-31 DIAGNOSIS — G57.22 FEMORAL NEUROPATHY OF LEFT LOWER EXTREMITY: Primary | ICD-10-CM

## 2023-08-31 PROCEDURE — 97110 THERAPEUTIC EXERCISES: CPT

## 2023-08-31 PROCEDURE — 97530 THERAPEUTIC ACTIVITIES: CPT

## 2023-08-31 NOTE — THERAPY TREATMENT NOTE
Outpatient Physical Therapy Neuro Treatment Note  [facility]     Patient Name: LAURA Sequeira  : 1935  MRN: [id number]  Today's Date: 2023      Visit Date: 2023    Visit Dx:    ICD-10-CM ICD-9-CM   1. Femoral neuropathy of left lower extremity  G57.22 355.2   2. Quadriceps weakness  M62.81 728.87   3. Functional gait abnormality  R26.89 781.2   4. Risk for falls  Z91.81 V15.88       Patient Active Problem List   Diagnosis    Atrial fibrillation    Hypertension    Atopic rhinitis    Gastroesophageal reflux disease    Hyperlipidemia    Type 2 diabetes mellitus    Low testosterone    History of aortic valve replacement with metallic valve    Kidney carcinoma    Stage 3b chronic kidney disease    Cerebrovascular accident (CVA) due to embolism of right middle cerebral artery    Iron deficiency anemia    Chronic anticoagulation    Hemiparesis of left nondominant side as late effect of cerebral infarction    Rectus sheath hematoma    Hospital discharge follow-up    Sepsis    Calculus of gallbladder with acute cholecystitis without obstruction    History of Clostridium difficile infection    Aspiration pneumonitis    Hematoma of iliopsoas muscle, left, initial encounter    History of CVA (cerebrovascular accident)    S/P laparoscopic cholecystectomy    Anemia    Hematoma of left iliopsoas muscle    Pneumonia of right lower lobe due to infectious organism    Supratherapeutic INR            PT Neuro       Row Name 23 1257             Precautions and Contraindications    Precautions/Limitations fall precautions  -LB      Precautions left quad weakness; dynamic knee brace  -LB         Subjective Pain    Able to rate subjective pain? yes  -LB      Pre-Treatment Pain Level 0  -LB      Post-Treatment Pain Level 0  -LB         Cognition    Overall Cognitive Status Impaired  -LB         Posture/Observations    Posture/Observations Comments Pt came up to unit in transport WC.  Spouse present during  session  -LB         Transfers    Sit-Stand [place] (Transfers) verbal cues;contact guard;standby assist  -LB      Stand-Sit [place] (Transfers) verbal cues;contact guard;standby assist;minimum assist (75% patient effort)  -LB      Transfers, Sit-Stand-Sit, Assist Device rolling walker  -LB      Comment, (Transfers) cues for foot and hamd placement  -LB         Gait/Stairs (Locomotion)    [place] Level (Gait) contact guard  -LB      Assistive Device (Gait) walker, front-wheeled  brace  -LB      Distance in Feet (Gait) 35 25 x 4  -LB      Pattern (Gait) step-through;step-to  -LB      Deviations/Abnormal Patterns (Gait) bilateral deviations;base of support, narrow;gait speed decreased;stride length decreased;weight shifting decreased  increased weight bearing through UES  -LB      Bilateral Gait Deviations forward flexed posture;heel strike decreased;weight shift ability decreased  significant trunk and hip flexion  -LB      Left Sided Gait Deviations decreased knee extension  -LB      Right Sided Gait Deviations --  quicker step with the right  -LB      Gait Assessment/Intervention vc for wide base of support and upright posture  -LB                User Key  (r) = Recorded By, (t) = Taken By, (c) = Cosigned By      Initials Name Provider Type    LB Nalini Cano, PT Physical Therapist                             PT Assessment/Plan       Row Name 08/31/23 8976          PT Assessment    Assessment Comments Mr. Lobato reports feeling weak today.  No fever or other symptons noted.  With practice and cues the patient was able to ambulate with a wider base of support and during the session was more aware of the improvement in balance with a wider base of support.  Quad strength does not appear to be much stronger at this point  -LB               User Key  (r) = Recorded By, (t) = Taken By, (c) = Cosigned By      Initials Name Provider Type    Nalini Grey, PT Physical Therapist                         OP Exercises       Row Name 08/31/23 1420 08/31/23 1257          Subjective Pain    Able to rate subjective pain? -- yes  -LB     Pre-Treatment Pain Level -- 0  -LB     Post-Treatment Pain Level -- 0  -LB        Total Minutes    15298 - PT Therapeutic Exercise Minutes 18  -LB --     27124 - PT Therapeutic Activity Minutes 22  -LB --        Exercise 4    Exercise Name 4 -- SLR  -LB     Reps 4 -- 15  -LB     Additional Comments -- wth A  -LB        Exercise 5    Exercise Name 5 -- quad sets  -LB     Reps 5 -- 10  -LB               User Key  (r) = Recorded By, (t) = Taken By, (c) = Cosigned By      Initials Name Provider Type    LB Nalini Cano, PT Physical Therapist                                    Therapy Education  Education Details: wider base of support with ambulation  Given: Mobility training  Program: Reinforced  How Provided: Verbal  Provided to: Patient  Level of Understanding: Verbalized, Teach back education performed              Time Calculation:   Start Time: 1259  Stop Time: 1344  Time Calculation (min): 45 min  Timed Charges  36894 - PT Therapeutic Exercise Minutes: 18  58768 - PT Therapeutic Activity Minutes: 22  Total Minutes  Timed Charges Total Minutes: 40   Total Minutes: 40   Therapy Charges for Today       Code Description Service Date Service Provider Modifiers Qty    99596326942  PT THER PROC EA 15 MIN 8/31/2023 Nalini Cano, PT GP 1    [id number]  PT THERAPEUTIC ACT EA 15 MIN 8/31/2023 Nalini Cano PT GP 2                      Nalini Cano PT  8/31/2023

## 2023-09-01 ENCOUNTER — OFFICE VISIT (OUTPATIENT)
Dept: CARDIOLOGY | Facility: CLINIC | Age: 88
End: 2023-09-01
Payer: COMMERCIAL

## 2023-09-01 ENCOUNTER — ANTICOAGULATION VISIT (OUTPATIENT)
Dept: PHARMACY | Facility: HOSPITAL | Age: 88
End: 2023-09-01
Payer: COMMERCIAL

## 2023-09-01 VITALS
OXYGEN SATURATION: 96 % | BODY MASS INDEX: 22.51 KG/M2 | HEART RATE: 58 BPM | SYSTOLIC BLOOD PRESSURE: 132 MMHG | HEIGHT: 72 IN | DIASTOLIC BLOOD PRESSURE: 76 MMHG

## 2023-09-01 DIAGNOSIS — Z95.4 HISTORY OF AORTIC VALVE REPLACEMENT WITH METALLIC VALVE: Primary | ICD-10-CM

## 2023-09-01 DIAGNOSIS — I48.21 PERMANENT ATRIAL FIBRILLATION: ICD-10-CM

## 2023-09-01 DIAGNOSIS — I10 PRIMARY HYPERTENSION: Primary | Chronic | ICD-10-CM

## 2023-09-01 DIAGNOSIS — I63.411 CEREBROVASCULAR ACCIDENT (CVA) DUE TO EMBOLISM OF RIGHT MIDDLE CEREBRAL ARTERY: ICD-10-CM

## 2023-09-01 DIAGNOSIS — Z95.4 HISTORY OF AORTIC VALVE REPLACEMENT WITH METALLIC VALVE: ICD-10-CM

## 2023-09-01 LAB — INR PPP: 3.9

## 2023-09-01 PROCEDURE — 99214 OFFICE O/P EST MOD 30 MIN: CPT | Performed by: INTERNAL MEDICINE

## 2023-09-01 PROCEDURE — 3078F DIAST BP <80 MM HG: CPT | Performed by: INTERNAL MEDICINE

## 2023-09-01 PROCEDURE — 3075F SYST BP GE 130 - 139MM HG: CPT | Performed by: INTERNAL MEDICINE

## 2023-09-01 NOTE — PROGRESS NOTES
"      CARDIOLOGY    Griffin Fried MD    ENCOUNTER DATE:  09/01/2023    LAURA Sequeira / 87 y.o. / male        CHIEF COMPLAINT / REASON FOR OFFICE VISIT     Permanent atrial fibrillation (08/03/2022 Follow up)  Hypertension  Metal aortic valve    HISTORY OF PRESENT ILLNESS       HPI  LAURA Sequeira is a 87 y.o. male who presents today for reevaluation.  Unfortunately he is in a wheelchair he spends a lot of time in his wheelchair.  He had fallen part of it was his prosthetic leg was locked.  Overall he is about the same he really has no complaints today he looks good.      The following portions of the patient's history were reviewed and updated as appropriate: allergies, current medications, past family history, past medical history, past social history, past surgical history and problem list.      VITAL SIGNS     Visit Vitals  /76 (BP Location: Left arm)   Pulse 58   Ht 182.9 cm (72\")   SpO2 96%   BMI 22.51 kg/mý         Wt Readings from Last 3 Encounters:   06/16/23 75.3 kg (166 lb)   03/10/23 75.3 kg (166 lb)   02/09/23 75.3 kg (166 lb)     Body mass index is 22.51 kg/mý.      REVIEW OF SYSTEMS   ROS        PHYSICAL EXAMINATION     Vitals reviewed.   Constitutional:       Appearance: Healthy appearance.   Pulmonary:      Effort: Pulmonary effort is normal.   Cardiovascular:      Normal rate. Irregularly irregular rhythm. Normal S1. Normal S2.       Murmurs: There is no murmur.      No gallop.  No click. No rub.      Comments: Artificial metal click  Pulses:     Intact distal pulses.   Edema:     Peripheral edema absent.   Neurological:      Mental Status: Alert and oriented to person, place and time.   Left BKA      REVIEWED DATA     Procedures    Cardiac Procedures:            ASSESSMENT & PLAN      Diagnosis Plan   1. Primary hypertension        2. Permanent atrial fibrillation        3. History of aortic valve replacement with metallic valve              SUMMARY/DISCUSSION  Hypertension blood " pressures good  Metal aortic valve.  Physical exam it sounds good.  His Coumadin is followed through the med management clinic.  Persistent atrial fibrillation heart rate stable remains asymptomatic.  Follow-up 1 year sooner if issues occur.        MEDICATIONS         Discharge Medications            Accurate as of September 1, 2023 12:15 PM. If you have any questions, ask your nurse or doctor.                Continue These Medications        Instructions Start Date   acetaminophen 325 MG tablet  Commonly known as: TYLENOL   650 mg, Oral, Every 6 Hours PRN      atorvastatin 40 MG tablet  Commonly known as: LIPITOR   TAKE ONE TABLET BY MOUTH DAILY      digoxin 125 MCG tablet  Commonly known as: LANOXIN   62.5 mcg, Oral, Daily Digoxin      diphenhydrAMINE 25 mg capsule  Commonly known as: BENADRYL   25 mg, Oral, 2 Times Daily PRN      famotidine 20 MG tablet  Commonly known as: PEPCID   20 mg, Oral, Daily      guaiFENesin 600 MG 12 hr tablet  Commonly known as: MUCINEX   600 mg, Oral      Gvoke HypoPen 1-Pack 1 MG/0.2ML solution auto-injector  Generic drug: Glucagon   1 mg, Subcutaneous, Once As Needed      Insulin Lispro 100 UNIT/ML injection  Commonly known as: HumaLOG   Use in V-GO 40 system to administer 40 to 100 units of insulin daily.      magnesium oxide 400 MG tablet  Commonly known as: MAG-OX   400 mg, Oral, 2 Times Daily      metoprolol succinate XL 25 MG 24 hr tablet  Commonly known as: TOPROL-XL   25 mg, Oral, Daily      V-Go 40 40 UNIT/24HR kit   1 each, Does not apply, Daily      warfarin 5 MG tablet  Commonly known as: COUMADIN   TAKE ONE TABLET BY MOUTH DAILY AS DIRECTED                   **Dragon Disclaimer:   Much of this encounter note is an electronic transcription/translation of spoken language to printed text. The electronic translation of spoken language may permit erroneous, or at times, nonsensical words or phrases to be inadvertently transcribed. Although I have reviewed the note for such  errors, some may still exist.

## 2023-09-01 NOTE — PROGRESS NOTES
Anticoagulation Clinic Progress Note    Anticoagulation Summary  As of 9/1/2023      INR goal:  3.0-3.5   TTR:  67.6 % (4.6 y)   INR used for dosing:  3.90 (9/1/2023)   Warfarin maintenance plan:  5 mg every day   Weekly warfarin total:  35 mg   Plan last modified:  Carmen El, PharmD (2/24/2023)   Next INR check:  9/8/2023   Priority:  High   Target end date:  Indefinite    Indications    History of aortic valve replacement with metallic valve [Z95.4]  Atrial fibrillation [I48.91]  Cerebrovascular accident (CVA) due to 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]                 Anticoagulation Episode Summary       INR check location:      Preferred lab:      Send INR reminders to:  [facility]    Comments:  **Home testing training 3/3/23** *CALL EVERY TIME* New INR goal 3 - 3.5 (see 1/2020 hospitalization for CVA)          Anticoagulation Care Providers       Provider Role Specialty Phone number    Griffin Fried MD Referring Cardiology [phone]            Clinic Interview:  Patient Findings     Negatives:  Signs/symptoms of thrombosis, Signs/symptoms of bleeding,   Laboratory test error suspected, Change in health, Change in alcohol use,   Change in activity, Upcoming invasive procedure, Emergency department   visit, Upcoming dental procedure, Missed doses, Extra doses, Change in   medications, Change in diet/appetite, Hospital admission, Bruising, Other   complaints      Clinical Outcomes     Negatives:  Major bleeding event, Thromboembolic event,   Anticoagulation-related hospital admission, Anticoagulation-related ED   visit, Anticoagulation-related fatality        INR History:      8/11/2023     8:02 AM 8/15/2023    12:00 AM 8/15/2023    10:00 AM 8/25/2023    12:00 AM 8/25/2023     8:00 AM 9/1/2023    12:00 AM 9/1/2023    11:51 AM   Anticoagulation Monitoring   INR 2.20  3.50  4.00  3.90   INR Date 8/11/2023  8/15/2023  8/25/2023  9/1/2023   INR Goal 3.0-3.5  3.0-3.5   3.0-3.5  3.0-3.5   Trend Same  Same  Same  Same   Last Week Total 30 mg  35 mg  35 mg  32.5 mg   Next Week Total 40 mg  35 mg  32.5 mg  32.5 mg   Sun 5 mg  5 mg  5 mg  5 mg   Mon 5 mg  5 mg  5 mg  5 mg   Tue -  5 mg  5 mg  5 mg   Wed -  5 mg  5 mg  5 mg   Thu -  5 mg  5 mg  5 mg   Fri 10 mg (8/11)  5 mg  2.5 mg (8/25)  2.5 mg (9/1)   Sat 5 mg  5 mg  5 mg  5 mg   Historical INR  3.50      4.00      3.90        Visit Report      Report Report       This result is from an external source.       Plan:  1. INR is Supratherapeutic today- see above in Anticoagulation Summary.   Will instruct LAURA Sequeira to Change their warfarin regimen- see above in Anticoagulation Summary.  2. Follow up in 1 weeks  3. They have been instructed to call if any changes in medications, doses, concerns, etc. Patient expresses understanding and has no further questions at this time.    Carmen El, PharmD

## 2023-09-05 ENCOUNTER — HOSPITAL ENCOUNTER (OUTPATIENT)
Dept: PHYSICAL THERAPY | Facility: HOSPITAL | Age: 88
Setting detail: THERAPIES SERIES
Discharge: HOME | End: 2023-09-05
Payer: COMMERCIAL

## 2023-09-05 DIAGNOSIS — G57.22 FEMORAL NEUROPATHY OF LEFT LOWER EXTREMITY: Primary | ICD-10-CM

## 2023-09-05 DIAGNOSIS — R26.89 FUNCTIONAL GAIT ABNORMALITY: ICD-10-CM

## 2023-09-05 DIAGNOSIS — M62.81 QUADRICEPS WEAKNESS: ICD-10-CM

## 2023-09-05 PROCEDURE — 97110 THERAPEUTIC EXERCISES: CPT

## 2023-09-05 PROCEDURE — 97530 THERAPEUTIC ACTIVITIES: CPT

## 2023-09-05 NOTE — THERAPY PROGRESS REPORT/RE-CERT
Outpatient Physical Therapy Neuro Progress Note  [facility]     Patient Name: LAURA Sequeira  : 1935  MRN: [id number]  Today's Date: 2023      Visit Date: 2023    Visit Dx:    ICD-10-CM ICD-9-CM   1. Femoral neuropathy of left lower extremity  G57.22 355.2   2. Quadriceps weakness  M62.81 728.87   3. Functional gait abnormality  R26.89 781.2       Patient Active Problem List   Diagnosis    Atrial fibrillation    Hypertension    Atopic rhinitis    Gastroesophageal reflux disease    Hyperlipidemia    Type 2 diabetes mellitus    Low testosterone    History of aortic valve replacement with metallic valve    Kidney carcinoma    Stage 3b chronic kidney disease    Cerebrovascular accident (CVA) due to embolism of right middle cerebral artery    Iron deficiency anemia    Chronic anticoagulation    Hemiparesis of left nondominant side as late effect of cerebral infarction    Rectus sheath hematoma    Hospital discharge follow-up    Sepsis    Calculus of gallbladder with acute cholecystitis without obstruction    History of Clostridium difficile infection    Aspiration pneumonitis    Hematoma of iliopsoas muscle, left, initial encounter    History of CVA (cerebrovascular accident)    S/P laparoscopic cholecystectomy    Anemia    Hematoma of left iliopsoas muscle    Pneumonia of right lower lobe due to infectious organism    Supratherapeutic INR            PT Neuro       Row Name 23 1318             Subjective Comments    Subjective Comments Feeling stronger and less pain in knee  -LB         Precautions and Contraindications    Precautions/Limitations fall precautions  -LB      Precautions left quad weakness; dynamic knee brace  -LB      Contraindications Needs to wear dynamic knee brace on left  -LB         Subjective Pain    Able to rate subjective pain? yes  -LB      Pre-Treatment Pain Level 0  -LB      Post-Treatment Pain Level 0  -LB         Cognition    Overall Cognitive Status Impaired   -LB      Arousal/Alertness Appropriate responses to stimuli  -LB      Memory Decreased recall of biographical information  -LB      Orientation Level Oriented to place;Oriented to time;Oriented to person;Oriented to situation  -LB      Safety Judgment Decreased awareness of need for assistance  -LB      Deficits Decreased awareness of deficits  -LB         Posture/Observations    Posture/Observations Comments Pt came up to unit in transport WC.  Spouse present during session  -LB         Transfers    Sit-Stand [place] (Transfers) verbal cues;standby assist;contact guard  -LB      Stand-Sit [place] (Transfers) verbal cues;standby assist;contact guard  -LB      Transfers, Sit-Stand-Sit, Assist Device rolling walker  -LB      Comment, (Transfers) cues for feet placement  -LB         Gait/Stairs (Locomotion)    [place] Level (Gait) contact guard  -LB      Assistive Device (Gait) walker, front-wheeled  -LB      Distance in Feet (Gait) 80  -LB      Pattern (Gait) step-through;step-to  -LB      Deviations/Abnormal Patterns (Gait) bilateral deviations;base of support, narrow;gait speed decreased;stride length decreased;weight shifting decreased  increased weight bearing through UEs  -LB      Bilateral Gait Deviations forward flexed posture;heel strike decreased;weight shift ability decreased  trunk and hip flexion  -LB      Left Sided Gait Deviations decreased knee extension  -LB      Right Sided Gait Deviations --  quicker step on the right  -LB      Gait Assessment/Intervention vc for a wider base of support  -LB         Curb Negotiation (Mobility)    [place], Curb Negotiation verbal cues;contact guard  -LB      Assistive Device (Curb Negotiation) walker, front-wheeled  -LB      Comment, Curb Negotiation (Mobility) cues for correct sequence.  Pt attempted to step up prior to placing walker on the step.  Performed times 2 - second attempt, the patient did better with sequence, only 1 vc  -LB          Knee Orthosis Management    Type (Knee Orthosis) left  -LB      Fabrication Comment (Knee Orthosis) Mcgee Trigger lock premier orthosis with tension bands  -LB      Functional Design (Knee Orthosis) dynamic orthosis  -LB      Therapeutic Indications (Knee Orthosis) stabilization and support  -LB      Wearing Schedule (Knee Orthosis) wear when out of bed only  -LB      Skin Assessment (Knee Orthosis) Spouse assist pt with donning and doffing.  Prosthetist was on unit earlier in the day. Spouse able to ask about bands and a new liner  -LB                User Key  (r) = Recorded By, (t) = Taken By, (c) = Cosigned By      Initials Name Provider Type    Nalini Grey PT Physical Therapist                             PT Assessment/Plan       Row Name 09/05/23 4989          PT Assessment    Assessment Comments The patient has been able to make some progress toward goals but is limited to some degree due to memory issues especially with remembering sequence with coming up to stand with correct feet and hand placement.  With walking, the patient is starting to maintain a slightly wider base of support but still with significant flexed posture and unable to maintain hip extension in stance.  Pt tolerating more stretches to achieve more knee extension passively to help with quad strength.  The patient does continue to be at risk for falls, will try to work on strengthening as well as safety techniques  -LB               User Key  (r) = Recorded By, (t) = Taken By, (c) = Cosigned By      Initials Name Provider Type    Nalini Grey PT Physical Therapist                        OP Exercises       Row Name 09/05/23 1501 09/05/23 1318          Subjective Comments    Subjective Comments -- Feeling stronger and less pain in knee  -LB        Subjective Pain    Able to rate subjective pain? -- yes  -LB     Pre-Treatment Pain Level -- 0  -LB     Post-Treatment Pain Level -- 0  -LB        Total Minutes    69301 - PT  Therapeutic Exercise Minutes 20  -LB --     58973 - PT Therapeutic Activity Minutes 24  -LB --        Exercise 1    Exercise Name 1 -- SAQs on peanut ball and on smaller bolster  -LB     Reps 1 -- 10  -LB        Exercise 2    Exercise Name 2 -- bridges with LEs on peanut  ball  -LB     Sets 2 -- 2  -LB     Reps 2 -- 10  -LB        Exercise 3    Exercise Name 3 -- supine knee extension stretch  -LB     Time 3 -- 3 mins  -LB        Exercise 4    Exercise Name 4 -- SLR  -LB     Reps 4 -- 10  -LB     Additional Comments -- moderate extensor lag  -LB               User Key  (r) = Recorded By, (t) = Taken By, (c) = Cosigned By      Initials Name Provider Type    LB Nalini Cano, PT Physical Therapist                                 PT OP Goals       Row Name 09/05/23 1300          PT Short Term Goals    STG 1 Pt to come to stand from an arm chair to his walker  and left knee brace with left foot placed next to right LE and SBA  -LB     STG 1 Progress Not Met  -LB     STG 2 Pt jennifer be indep with HEP to maintain progress in therapy  -LB     STG 2 Progress Met  -LB     STG 3 Pt to ambulate 150 ft with left knee brace Rwx and CGA and more knee extension in stance  -LB     STG 3 Progress Met  -LB     STG 4 Assist pt with modification to left dynamic knee extension brace  -LB     STG 4 Progress Met  -LB     STG 5 Pt will negogiate curb with Rwx CGA and no VC  -LB     STG 5 Progress Progressing  -LB     STG 5 Progress Comments First attempt today pt needed vc for sequence  -LB        Long Term Goals    LTG Date to Achieve 09/19/23  -LB     LTG 1 Pt to come to stand from low chair with Rwx, knee brace CGA  -LB     LTG 1 Progress Goal Revised  -LB     LTG 2 Pt to complete the TUG in < 30 seconds while ambulating with FWW and knee brace.  -LB     LTG 2 Progress Ongoing  -LB     LTG 3 Pt to increase left knee strength to 3-/5  -LB     LTG 3 Progress Goal Revised  -LB     LTG 4 Pt to improve left knee ROM to -10 from extension  -LB      LTG 5 Pt will be indep with progression of HEP  -LB     LTG 5 Progress Ongoing  -LB     LTG 6 Pt will ambulate household distances with FWW, dynamic knee brace in locked position and SBA  -LB     LTG 6 Progress Ongoing  -LB        Time Calculation    PT Goal Re-Cert Due Date 09/19/23  -LB               User Key  (r) = Recorded By, (t) = Taken By, (c) = Cosigned By      Initials Name Provider Type    LB Nalini Cano, PT Physical Therapist                    Therapy Education  Education Details: curb negogiation  Given: Mobility training  Program: Reinforced, Progressed  How Provided: Verbal, Demonstration  Provided to: Patient  Level of Understanding: Verbalized, Teach back education performed              Time Calculation:   Start Time: 1258  Stop Time: 1347  Time Calculation (min): 49 min  Timed Charges  98576 - PT Therapeutic Exercise Minutes: 20  84246 - PT Therapeutic Activity Minutes: 24  Total Minutes  Timed Charges Total Minutes: 44   Total Minutes: 44   Therapy Charges for Today       Code Description Service Date Service Provider Modifiers Qty    03057024246  PT THER PROC EA 15 MIN 9/5/2023 Nalini Cano, PT GP 1    [id number]  PT THERAPEUTIC ACT EA 15 MIN 9/5/2023 Nalini Cano, PT GP 2                      Nalini Cano PT  9/5/2023

## 2023-09-07 ENCOUNTER — HOSPITAL ENCOUNTER (OUTPATIENT)
Dept: PHYSICAL THERAPY | Facility: HOSPITAL | Age: 88
Setting detail: THERAPIES SERIES
Discharge: HOME | End: 2023-09-07
Payer: COMMERCIAL

## 2023-09-07 DIAGNOSIS — G57.22 FEMORAL NEUROPATHY OF LEFT LOWER EXTREMITY: Primary | ICD-10-CM

## 2023-09-07 DIAGNOSIS — M62.81 QUADRICEPS WEAKNESS: ICD-10-CM

## 2023-09-07 DIAGNOSIS — R26.89 FUNCTIONAL GAIT ABNORMALITY: ICD-10-CM

## 2023-09-07 PROCEDURE — 97110 THERAPEUTIC EXERCISES: CPT

## 2023-09-07 PROCEDURE — 97530 THERAPEUTIC ACTIVITIES: CPT

## 2023-09-07 NOTE — THERAPY TREATMENT NOTE
Outpatient Physical Therapy Neuro Treatment Note  [facility]     Patient Name: LAURA Sequeira  : 1935  MRN: [id number]  Today's Date: 2023      Visit Date: 2023    Visit Dx:    ICD-10-CM ICD-9-CM   1. Femoral neuropathy of left lower extremity  G57.22 355.2   2. Quadriceps weakness  M62.81 728.87   3. Functional gait abnormality  R26.89 781.2       Patient Active Problem List   Diagnosis    Atrial fibrillation    Hypertension    Atopic rhinitis    Gastroesophageal reflux disease    Hyperlipidemia    Type 2 diabetes mellitus    Low testosterone    History of aortic valve replacement with metallic valve    Kidney carcinoma    Stage 3b chronic kidney disease    Cerebrovascular accident (CVA) due to embolism of right middle cerebral artery    Iron deficiency anemia    Chronic anticoagulation    Hemiparesis of left nondominant side as late effect of cerebral infarction    Rectus sheath hematoma    Hospital discharge follow-up    Sepsis    Calculus of gallbladder with acute cholecystitis without obstruction    History of Clostridium difficile infection    Aspiration pneumonitis    Hematoma of iliopsoas muscle, left, initial encounter    History of CVA (cerebrovascular accident)    S/P laparoscopic cholecystectomy    Anemia    Hematoma of left iliopsoas muscle    Pneumonia of right lower lobe due to infectious organism    Supratherapeutic INR            PT Neuro       Row Name 23 1313             Subjective Comments    Subjective Comments Spouse reports pt is stronger  -LB         Precautions and Contraindications    Precautions/Limitations fall precautions  -LB      Precautions left quad weakness; dynamic knee brace  -LB      Contraindications Needs to wear dynamic knee brace on left  -LB         Subjective Pain    Able to rate subjective pain? yes  -LB      Pre-Treatment Pain Level 0  -LB      Post-Treatment Pain Level 0  -LB         Cognition    Overall Cognitive Status Impaired  -LB          Posture/Observations    Posture/Observations Comments Pt came up to unit in transport WC.  Spouse present during session  -LB         Transfers    Sit-Stand [place] (Transfers) standby assist  -LB      Stand-Sit [place] (Transfers) standby assist  -LB      Transfers, Sit-Stand-Sit, Assist Device rolling walker  knee brace  -LB         Gait/Stairs (Locomotion)    [place] Level (Gait) standby assist  -LB      Assistive Device (Gait) walker, front-wheeled  -LB      Distance in Feet (Gait) 80 x 3  -LB      Deviations/Abnormal Patterns (Gait) bilateral deviations;base of support, narrow;gait speed decreased;stride length decreased;weight shifting decreased  increased WBing through UEs  -LB      Bilateral Gait Deviations forward flexed posture;heel strike decreased;weight shift ability decreased  -LB      Left Sided Gait Deviations decreased knee extension  -LB      Right Sided Gait Deviations --  quicker step on the right  -LB      Gait Assessment/Intervention vc for upright posture and wider base of support  -LB         Knee Orthosis Management    Type (Knee Orthosis) left  -LB      Fabrication Comment (Knee Orthosis) Mcgee Trigger lock premier orthosis with tension bands  -LB      Functional Design (Knee Orthosis) dynamic orthosis  -LB      Therapeutic Indications (Knee Orthosis) stabilization and support  -LB      Wearing Schedule (Knee Orthosis) wear when out of bed only  -LB                User Key  (r) = Recorded By, (t) = Taken By, (c) = Cosigned By      Initials Name Provider Type    Nalini Grey, PT Physical Therapist                             PT Assessment/Plan       Row Name 09/07/23 5712          PT Assessment    Assessment Comments The patient did well with foot placement prior to coming up to stand, demonstrating improved initial standing balance and no posterior lean.  With walking, the patient did increase weight through Left LE ~50% of the time.  -LB               User Key  (r)  = Recorded By, (t) = Taken By, (c) = Cosigned By      Initials Name Provider Type    Nalini Grey PT Physical Therapist                        OP Exercises       Row Name 09/07/23 1347 09/07/23 1313          Subjective Comments    Subjective Comments -- Spouse reports pt is stronger  -LB        Subjective Pain    Able to rate subjective pain? -- yes  -LB     Pre-Treatment Pain Level -- 0  -LB     Post-Treatment Pain Level -- 0  -LB        Total Minutes    88817 - PT Therapeutic Exercise Minutes 18  -LB --     51235 - PT Therapeutic Activity Minutes 25  -LB --        Exercise 4    Exercise Name 4 -- SLR  -LB     Reps 4 -- 10  -LB        Exercise 6    Exercise Name 6 -- seated knee extension/flexion  -LB     Sets 6 -- 2  -LB     Reps 6 -- 10  -LB               User Key  (r) = Recorded By, (t) = Taken By, (c) = Cosigned By      Initials Name Provider Type    Nalini Grey PT Physical Therapist                                                   Time Calculation:   Start Time: 1257  Stop Time: 1343  Time Calculation (min): 46 min  Timed Charges  94235 - PT Therapeutic Exercise Minutes: 18  12332 - PT Therapeutic Activity Minutes: 25  Total Minutes  Timed Charges Total Minutes: 43   Total Minutes: 43   Therapy Charges for Today       Code Description Service Date Service Provider Modifiers Qty    66134715746  PT THER PROC EA 15 MIN 9/7/2023 Nalini Cano, PT GP 1    [id number]  PT THERAPEUTIC ACT EA 15 MIN 9/7/2023 Nalini Cano, PT GP 2                      Nalini Cano PT  9/7/2023

## 2023-09-08 ENCOUNTER — ANTICOAGULATION VISIT (OUTPATIENT)
Dept: PHARMACY | Facility: HOSPITAL | Age: 88
End: 2023-09-08
Payer: COMMERCIAL

## 2023-09-08 DIAGNOSIS — Z95.4 HISTORY OF AORTIC VALVE REPLACEMENT WITH METALLIC VALVE: Primary | ICD-10-CM

## 2023-09-08 DIAGNOSIS — I63.411 CEREBROVASCULAR ACCIDENT (CVA) DUE TO EMBOLISM OF RIGHT MIDDLE CEREBRAL ARTERY: ICD-10-CM

## 2023-09-08 LAB — INR PPP: 3.5

## 2023-09-08 NOTE — PROGRESS NOTES
Anticoagulation Clinic Progress Note    Anticoagulation Summary  As of 9/8/2023      INR goal:  3.0-3.5   TTR:  67.3 % (4.6 y)   INR used for dosing:  3.50 (9/8/2023)   Warfarin maintenance plan:  5 mg every day   Weekly warfarin total:  35 mg   No change documented:  Carmen El PharmD   Plan last modified:  Carmen El PharmD (2/24/2023)   Next INR check:  9/15/2023   Priority:  High   Target end date:  Indefinite    Indications    History of aortic valve replacement with metallic valve [Z95.4]  Atrial fibrillation [I48.91]  Cerebrovascular accident (CVA) due to 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]                 Anticoagulation Episode Summary       INR check location:      Preferred lab:      Send INR reminders to:  [facility]    Comments:  **Home testing training 3/3/23** *CALL EVERY TIME* New INR goal 3 - 3.5 (see 1/2020 hospitalization for CVA)          Anticoagulation Care Providers       Provider Role Specialty Phone number    Griffin Fried MD Referring Cardiology [phone]            Clinic Interview:  Patient Findings     Negatives:  Signs/symptoms of thrombosis, Signs/symptoms of bleeding,   Laboratory test error suspected, Change in health, Change in alcohol use,   Change in activity, Upcoming invasive procedure, Emergency department   visit, Upcoming dental procedure, Missed doses, Extra doses, Change in   medications, Change in diet/appetite, Hospital admission, Bruising, Other   complaints      Clinical Outcomes     Negatives:  Major bleeding event, Thromboembolic event,   Anticoagulation-related hospital admission, Anticoagulation-related ED   visit, Anticoagulation-related fatality        INR History:      8/15/2023    10:00 AM 8/25/2023    12:00 AM 8/25/2023     8:00 AM 9/1/2023    12:00 AM 9/1/2023    11:51 AM 9/8/2023    12:00 AM 9/8/2023     8:49 AM   Anticoagulation Monitoring   INR 3.50  4.00  3.90  3.50   INR Date 8/15/2023  8/25/2023   9/1/2023 9/8/2023   INR Goal 3.0-3.5  3.0-3.5  3.0-3.5  3.0-3.5   Trend Same  Same  Same  Same   Last Week Total 35 mg  35 mg  32.5 mg  32.5 mg   Next Week Total 35 mg  32.5 mg  32.5 mg  35 mg   Sun 5 mg  5 mg  5 mg  5 mg   Mon 5 mg  5 mg  5 mg  5 mg   Tue 5 mg  5 mg  5 mg  5 mg   Wed 5 mg  5 mg  5 mg  5 mg   Thu 5 mg  5 mg  5 mg  5 mg   Fri 5 mg  2.5 mg (8/25)  2.5 mg (9/1)  5 mg   Sat 5 mg  5 mg  5 mg  5 mg   Historical INR  4.00      3.90      3.50        Visit Report    Report Report         This result is from an external source.       Plan:  1. INR is Therapeutic today- see above in Anticoagulation Summary.   Will instruct LAURA Sequeira to Continue their warfarin regimen- see above in Anticoagulation Summary.  2. Follow up in 1 weeks  3.  They have been instructed to call if any changes in medications, doses, concerns, etc. Patient expresses understanding and has no further questions at this time.    Carmen El, PharmD

## 2023-09-12 ENCOUNTER — HOSPITAL ENCOUNTER (OUTPATIENT)
Dept: PHYSICAL THERAPY | Facility: HOSPITAL | Age: 88
Setting detail: THERAPIES SERIES
Discharge: HOME | End: 2023-09-12
Payer: COMMERCIAL

## 2023-09-12 DIAGNOSIS — M62.81 QUADRICEPS WEAKNESS: ICD-10-CM

## 2023-09-12 DIAGNOSIS — R26.89 FUNCTIONAL GAIT ABNORMALITY: ICD-10-CM

## 2023-09-12 DIAGNOSIS — G57.22 FEMORAL NEUROPATHY OF LEFT LOWER EXTREMITY: Primary | ICD-10-CM

## 2023-09-12 DIAGNOSIS — S70.12XA HEMATOMA OF ILIOPSOAS MUSCLE, LEFT, INITIAL ENCOUNTER: ICD-10-CM

## 2023-09-12 DIAGNOSIS — Z91.81 RISK FOR FALLS: ICD-10-CM

## 2023-09-12 PROCEDURE — 97110 THERAPEUTIC EXERCISES: CPT

## 2023-09-12 PROCEDURE — 97116 GAIT TRAINING THERAPY: CPT

## 2023-09-12 PROCEDURE — 97530 THERAPEUTIC ACTIVITIES: CPT

## 2023-09-12 NOTE — THERAPY TREATMENT NOTE
Outpatient Physical Therapy Ortho Treatment Note  [facility]     Patient Name: LAURA Sequeira  : 1935  MRN: [id number]  Today's Date: 2023      Visit Date: 2023    Visit Dx:    ICD-10-CM ICD-9-CM   1. Femoral neuropathy of left lower extremity  G57.22 355.2   2. Quadriceps weakness  M62.81 728.87   3. Functional gait abnormality  R26.89 781.2   4. Risk for falls  Z91.81 V15.88   5. Hematoma of iliopsoas muscle, left, initial encounter  S70.12XA 924.00       Patient Active Problem List   Diagnosis    Atrial fibrillation    Hypertension    Atopic rhinitis    Gastroesophageal reflux disease    Hyperlipidemia    Type 2 diabetes mellitus    Low testosterone    History of aortic valve replacement with metallic valve    Kidney carcinoma    Stage 3b chronic kidney disease    Cerebrovascular accident (CVA) due to embolism of right middle cerebral artery    Iron deficiency anemia    Chronic anticoagulation    Hemiparesis of left nondominant side as late effect of cerebral infarction    Rectus sheath hematoma    Hospital discharge follow-up    Sepsis    Calculus of gallbladder with acute cholecystitis without obstruction    History of Clostridium difficile infection    Aspiration pneumonitis    Hematoma of iliopsoas muscle, left, initial encounter    History of CVA (cerebrovascular accident)    S/P laparoscopic cholecystectomy    Anemia    Hematoma of left iliopsoas muscle    Pneumonia of right lower lobe due to infectious organism    Supratherapeutic INR        Past Medical History:   Diagnosis Date    Allergic rhinitis     Aortic valve insufficiency     Ascending aortic aneurysm     Aspiration pneumonia     Atrial fibrillation     Bacteremia     Calcific aortic stenosis of bicuspid valve     Cardiac arrest     Cataracts, bilateral     CKD (chronic kidney disease) stage 3, GFR 30-59 ml/min     Contact dermatitis due to poison ivy     Elbow fracture     GERD (gastroesophageal reflux disease)     GI  bleed     2021; s/p Colonoscopy and EGD    H/O mechanical aortic valve replacement     Head injury     History of transfusion     Hyperlipidemia     Hypertension     Kidney carcinoma     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: follows with [facility] for management    Nonischemic cardiomyopathy     Prostate cancer     Renal oncocytoma     Stroke (cerebrum)     Type 2 diabetes mellitus     Visual field defect         Past Surgical History:   Procedure Laterality Date    AORTIC VALVE REPAIR/REPLACEMENT      ASCENDING AORTIC ANEURYSM REPAIR W/ MECHANICAL AORTIC VALVE REPLACEMENT      CHOLECYSTECTOMY WITH INTRAOPERATIVE CHOLANGIOGRAM N/A 03/29/2022    Procedure: Laparoscopic cholecystectomy with cholangiogram, possible open;  Surgeon: Lisa Guidry MD;  Location: [facility] MAIN OR;  Service: General;  Laterality: N/A;    COLONOSCOPY N/A 10/28/2021    Procedure: COLONOSCOPY to cecum:  cold snare polyps,;  Surgeon: Diensh Meza MD;  Location: [facility] ENDOSCOPY;  Service: Gastroenterology;  Laterality: N/A;  pre:  Iron deficiency anemia  post:  polyps, diverticulosis,     COLONOSCOPY N/A 11/09/2021    Procedure: COLONOSCOPY to cecum with APC cautery of AVM and clip placement x1;  Surgeon: Pavan Rodriguez MD;  Location: [facility] ENDOSCOPY;  Service: Gastroenterology;  Laterality: N/A;  PRE - gi bleed, anemia  POST - diverticulosis, poor prep, AVM right colon    ENDOSCOPY N/A 10/28/2021    Procedure: ESOPHAGOGASTRODUODENOSCOPY with biopsies;  Surgeon: Dinesh Meza MD;  Location: [facility] ENDOSCOPY;  Service: Gastroenterology;  Laterality: N/A;  pre:  Iron deficiency anemia  post:  duodenitis and gastritis    EYE SURGERY  12/09/2020    cataract surgery     NEPHRECTOMY      OTHER SURGICAL HISTORY      elbow surgery    OTHER SURGICAL HISTORY      right arm surgery    PROSTATE SURGERY      prostate removal    THORACENTESIS Left     diagnostic        PT Ortho        Row Name 09/12/23 1300       Orthotics & Prosthetics Management    Orthosis Location knee orthosis  -DP       Knee Orthosis Management    Type (Knee Orthosis) left  -DP    Fabrication Comment (Knee Orthosis) Mcgee Trigger lock premier orthosis with tension bands  -DP    Functional Design (Knee Orthosis) dynamic orthosis  -DP    Therapeutic Indications (Knee Orthosis) stabilization and support  -DP    Wearing Schedule (Knee Orthosis) wear when out of bed only;wear with activity/work  -DP    Orthosis Training (Knee Orthosis) patient;caregiver  -DP       Gait/Stairs (Locomotion)    Gait Assessment/Intervention vc for upright posture and equal step length  -DP       Curb Negotiation (Mobility)    [place]ay, Curb Negotiation verbal cues;contact guard  -DP    Assistive Device (Curb Negotiation) walker, front-wheeled  -DP    Comment, Curb Negotiation (Mobility) pt did not need cuing for sequence but did ask for confirmation sequence that pt correctly stated  -DP              User Key  (r) = Recorded By, (t) = Taken By, (c) = Cosigned By      Initials Name Provider Type    DP Vini Lombardo PT Physical Therapist                     PT Neuro       Row Name 09/12/23 1300             Subjective    Subjective Comments States he is feeling stonger  -DP         Precautions and Contraindications    Precautions/Limitations fall precautions  -DP      Precautions left quad weakness; dynamic knee brace  -DP      Contraindications Needs to wear dynamic knee brace on left  -DP         Posture/Observations    Posture/Observations Comments Pt came up to unit in transport WC.  Spouse present during session  -DP         Transfers    Sit-Stand [place] (Transfers) standby assist  -DP      Stand-Sit [place] (Transfers) standby assist  -DP         Gait/Stairs (Locomotion)    [place] Level (Gait) standby assist  -DP      Assistive Device (Gait) walker, front-wheeled  -DP      Distance in Feet (Gait) 80'x3  -DP      Pattern  (Gait) step-through;step-to  -DP      Deviations/Abnormal Patterns (Gait) bilateral deviations;base of support, narrow;gait speed decreased;stride length decreased;weight shifting decreased  -DP      Bilateral Gait Deviations forward flexed posture;heel strike decreased;weight shift ability decreased  -DP      Left Sided Gait Deviations decreased knee extension  -DP                User Key  (r) = Recorded By, (t) = Taken By, (c) = Cosigned By      Initials Name Provider Type    Vini Casillas PT Physical Therapist                             PT Assessment/Plan       Row Name 09/12/23 1338          PT Assessment    Functional Limitations Decreased safety during functional activities;Impaired gait;Impaired locomotion;Limitation in home management;Performance in self-care ADL  -DP     Impairments Balance;Endurance;Gait;Motor function;Muscle strength;Peripheral nerve integrity;Impaired postural alignment  -DP     Assessment Comments Pt cued to increase step length on RLE and was able to improve but then decreased step length on LLE as pt had difficulty performing equal and adequate step length on BLE but currently 1 LE per gait cycle.  -DP               User Key  (r) = Recorded By, (t) = Taken By, (c) = Cosigned By      Initials Name Provider Type    Vini Casillas PT Physical Therapist                       OP Exercises       Row Name 09/12/23 1300             Subjective    Subjective Comments States he is feeling stonger  -DP         Total Minutes    23109 - Gait Training Minutes  15  -DP      51040 - PT Therapeutic Exercise Minutes 15  -DP      70559 - PT Therapeutic Activity Minutes 15  -DP         Exercise 2    Exercise Name 2 bridges with LEs on peanut  ball  -DP      Cueing 2 Verbal;Tactile  -DP      Sets 2 2  -DP      Reps 2 10  -DP         Exercise 4    Exercise Name 4 SLR  -DP      Sets 4 1  -DP      Reps 4 10  -DP         Exercise 5    Exercise Name 5 quad sets  -DP      Cueing 5 Verbal  -DP      Reps  5 10  -DP         Exercise 6    Exercise Name 6 seated knee extension/flexion  -DP      Reps 6 10  -DP                User Key  (r) = Recorded By, (t) = Taken By, (c) = Cosigned By      Initials Name Provider Type    Vini Casillas PT Physical Therapist                                     Therapy Education  Education Details: HEP  Given: HEP  Program: Reinforced  How Provided: Verbal, Demonstration  Provided to: Patient  Level of Understanding: Verbalized, Teach back education performed              Time Calculation:   Start Time: 1250  Stop Time: 1335  Time Calculation (min): 45 min  Total Timed Code Minutes- PT: 45 minute(s)  Timed Charges  06523 - PT Therapeutic Exercise Minutes: 15  41039 - Gait Training Minutes : 15  58956 - PT Therapeutic Activity Minutes: 15  Total Minutes  Timed Charges Total Minutes: 45   Total Minutes: 45  Therapy Charges for Today       Code Description Service Date Service Provider Modifiers Qty    67643831774 HC PT THER PROC EA 15 MIN 9/12/2023 Vini Lombardo, PT GP 1    12698307878 HC PT THERAPEUTIC ACT EA 15 MIN 9/12/2023 Vini Lombardo, PT GP 1    69462279630 HC GAIT TRAINING EA 15 MIN 9/12/2023 Vini Lombardo PT GP 1                      Vini Lombardo PT  9/12/2023

## 2023-09-14 ENCOUNTER — HOSPITAL ENCOUNTER (OUTPATIENT)
Dept: PHYSICAL THERAPY | Facility: HOSPITAL | Age: 88
Setting detail: THERAPIES SERIES
Discharge: HOME | End: 2023-09-14
Payer: COMMERCIAL

## 2023-09-14 ENCOUNTER — PHARMACY VISIT (OUTPATIENT)
Facility: HOSPITAL | Age: 88
End: 2023-09-14

## 2023-09-14 DIAGNOSIS — Z91.81 RISK FOR FALLS: ICD-10-CM

## 2023-09-14 DIAGNOSIS — R26.89 FUNCTIONAL GAIT ABNORMALITY: ICD-10-CM

## 2023-09-14 DIAGNOSIS — M62.81 QUADRICEPS WEAKNESS: ICD-10-CM

## 2023-09-14 DIAGNOSIS — G57.22 FEMORAL NEUROPATHY OF LEFT LOWER EXTREMITY: Primary | ICD-10-CM

## 2023-09-14 PROCEDURE — 97530 THERAPEUTIC ACTIVITIES: CPT

## 2023-09-14 PROCEDURE — 97110 THERAPEUTIC EXERCISES: CPT

## 2023-09-14 NOTE — THERAPY DISCHARGE NOTE
Outpatient Physical Therapy Neuro Treatment Note/Discharge Summary  [facility]     Patient Name: LAURA Sequeira  : 1935  MRN: [id number]  Today's Date: 2023      Visit Date: 2023    Visit Dx:    ICD-10-CM ICD-9-CM   1. Femoral neuropathy of left lower extremity  G57.22 355.2   2. Quadriceps weakness  M62.81 728.87   3. Functional gait abnormality  R26.89 781.2   4. Risk for falls  Z91.81 V15.88       Patient Active Problem List   Diagnosis    Atrial fibrillation    Hypertension    Atopic rhinitis    Gastroesophageal reflux disease    Hyperlipidemia    Type 2 diabetes mellitus    Low testosterone    History of aortic valve replacement with metallic valve    Kidney carcinoma    Stage 3b chronic kidney disease    Cerebrovascular accident (CVA) due to embolism of right middle cerebral artery    Iron deficiency anemia    Chronic anticoagulation    Hemiparesis of left nondominant side as late effect of cerebral infarction    Rectus sheath hematoma    Hospital discharge follow-up    Sepsis    Calculus of gallbladder with acute cholecystitis without obstruction    History of Clostridium difficile infection    Aspiration pneumonitis    Hematoma of iliopsoas muscle, left, initial encounter    History of CVA (cerebrovascular accident)    S/P laparoscopic cholecystectomy    Anemia    Hematoma of left iliopsoas muscle    Pneumonia of right lower lobe due to infectious organism    Supratherapeutic INR             PT Neuro       Row Name 23 1345 23 1300          Subjective    Subjective Comments Fine with discharge;  Encouraged pt to perform HEP at home  -LB --        Precautions and Contraindications    Precautions/Limitations fall precautions  -LB --     Precautions left quad weakness; dynamic knee brace  -LB --     Contraindications Needs to wear dynamic knee brace on left  -LB --        Subjective Pain    Able to rate subjective pain? yes  -LB --     Pre-Treatment Pain Level 0  -LB --      Post-Treatment Pain Level 0  -LB --        Cognition    Overall Cognitive Status -- Impaired  -LB     Arousal/Alertness -- Appropriate responses to stimuli  -LB     Memory -- Decreased recall of biographical information  -LB     Orientation Level -- Oriented to place;Oriented to time;Oriented to person;Oriented to situation  -LB     Safety Judgment -- Decreased awareness of need for assistance  -LB     Deficits -- Decreased awareness of deficits  -LB        Posture/Observations    Posture/Observations Comments Pt came up to unit in transport WC.  Spouse present during session  -LB Pt came up to unit in transport WC.  Spouse present during session  -LB        Bed Mobility    Supine-Sit [place] (Bed Mobility) -- modified independence  -LB     Sit-Supine [place] (Bed Mobility) -- modified independence  -LB        Transfers    Sit-Stand [place] (Transfers) -- standby assist  -LB     Stand-Sit [place] (Transfers) -- standby assist  -LB     Transfers, Sit-Stand-Sit, Assist Device -- rolling walker  kn ee brace  -LB     Comment, (Transfers) -- pt placed feet correctly and demonstrated correct hand placement  -LB        Gait/Stairs (Locomotion)    [place] Level (Gait) -- standby assist  -LB     Assistive Device (Gait) -- walker, front-wheeled  knne brace  -LB     Distance in Feet (Gait) -- 80 x 2  -LB     Pattern (Gait) -- step-through;step-to  -LB     Deviations/Abnormal Patterns (Gait) -- bilateral deviations;base of support, narrow;gait speed decreased;stride length decreased;weight shifting decreased  -LB     Bilateral Gait Deviations -- forward flexed posture;heel strike decreased;weight shift ability decreased  -LB     Left Sided Gait Deviations -- decreased knee extension  -LB     Gait Assessment/Intervention -- cues for upright posture  -LB        Orthotics & Prosthetics Management    Orthosis Location -- knee orthosis  -LB        Knee Orthosis Management    Type (Knee Orthosis) -- left   -LB     Fabrication Comment (Knee Orthosis) -- Mcgee Trigger lock premier orthosis with tension bands  -LB     Functional Design (Knee Orthosis) -- dynamic orthosis  -LB     Therapeutic Indications (Knee Orthosis) -- stabilization and support  -LB     Wearing Schedule (Knee Orthosis) -- wear with activity/work  -LB     Skin Assessment (Knee Orthosis) -- spouse assists pt with donning and doffing of brace  -LB               User Key  (r) = Recorded By, (t) = Taken By, (c) = Cosigned By      Initials Name Provider Type    Nalini Grey PT Physical Therapist                             PT Assessment/Plan       Row Name 09/14/23 1647          PT Assessment    Assessment Comments The patient with limited progress with quad strengthening and mobility over last few sessions.  The patient's spouse and only caregiver is also having more difficulty with bringing the patient into therapy.  Decided to end therapy today.  The patient has not met all goals but mostly due to limited improvement in quad strength.  The patient's cognitive level also limits safety with mobility and not able to progress to a hgher level of independence.  The patient has a HEP to continue at home.  Spouse has good knowledge on the patient's abilities and care of the brace.  -LB               User Key  (r) = Recorded By, (t) = Taken By, (c) = Cosigned By      Initials Name Provider Type    Nalini Grey PT Physical Therapist                          OP Exercises       Row Name 09/14/23 6548 09/14/23 1345          Subjective    Subjective Comments -- Fine with discharge;  Encouraged pt to perform HEP at home  -LB        Subjective Pain    Able to rate subjective pain? -- yes  -LB     Pre-Treatment Pain Level -- 0  -LB     Post-Treatment Pain Level -- 0  -LB        Total Minutes    84270 - PT Therapeutic Exercise Minutes 30  -LB --     86801 - PT Therapeutic Activity Minutes 12  -LB --        Exercise 1    Exercise Name 1 -- SAQs on peanut ball  and on smaller bolster  -LB     Reps 1 -- 10  -LB        Exercise 2    Exercise Name 2 -- bridges with LEs on peanut  ball  -LB     Reps 2 -- 10  -LB        Exercise 3    Exercise Name 3 -- supine knee extension stretch  -LB     Time 3 -- 3 mins  -LB               User Key  (r) = Recorded By, (t) = Taken By, (c) = Cosigned By      Initials Name Provider Type    Nalini Grey PT Physical Therapist                                   PT OP Goals       Row Name 09/14/23 1300          PT Short Term Goals    STG 1 Pt to come to stand from an arm chair to his walker  and left knee brace with left foot placed next to right LE and SBA  -LB     STG 1 Progress Met  -LB        Long Term Goals    LTG 1 Pt to come to stand from low chair with Rwx, knee brace CGA  -LB     LTG 1 Progress Not Met  -LB     LTG 2 Pt to complete the TUG in < 30 seconds while ambulating with FWW and knee brace.  -LB     LTG 2 Progress Not Met  -LB     LTG 3 Pt to increase left knee strength to 3-/5  -LB     LTG 3 Progress Not Met  -LB     LTG 4 Pt to improve left knee ROM to -10 from extension  -LB     LTG 4 Progress Not Met  -LB     LTG 4 Progress Comments -20  -LB               User Key  (r) = Recorded By, (t) = Taken By, (c) = Cosigned By      Initials Name Provider Type    Nalini Grey PT Physical Therapist                    Therapy Education  Education Details: Reinforced the need to continue HEP at home  Given: HEP  Program: Reinforced  How Provided: Verbal  Provided to: Patient  Level of Understanding: Verbalized, Teach back education performed       Timed Up and Go (TUG)  TUG Test 1: 50 seconds  Timed Up and Go Comments: FWW and brace    Time Calculation:   Start Time: 1300  Stop Time: 1345  Time Calculation (min): 45 min  Timed Charges  59846 - PT Therapeutic Exercise Minutes: 30  64789 - PT Therapeutic Activity Minutes: 12  Total Minutes  Timed Charges Total Minutes: 42   Total Minutes: 42     Therapy Charges for Today       Code  Description Service Date Service Provider Modifiers Qty    56294238703  PT THER PROC EA 15 MIN 9/14/2023 Nalini Cano, PT GP 2    91868924708 HC PT THERAPEUTIC ACT EA 15 MIN 9/14/2023 Nalini Cano, PT GP 1            PT G-Codes  TUG Test 1: 50 seconds     OP PT Discharge Summary  Outcomes Achieved: Unable to make functional progress toward goals at this time  Discharge Destination: Home with home program, Home with caregiver assist        Nalini Cano, PT  9/14/2023

## 2023-09-15 ENCOUNTER — ANTICOAGULATION VISIT (OUTPATIENT)
Dept: PHARMACY | Facility: HOSPITAL | Age: 88
End: 2023-09-15
Payer: COMMERCIAL

## 2023-09-15 DIAGNOSIS — I63.411 CEREBROVASCULAR ACCIDENT (CVA) DUE TO EMBOLISM OF RIGHT MIDDLE CEREBRAL ARTERY: ICD-10-CM

## 2023-09-15 DIAGNOSIS — Z95.4 HISTORY OF AORTIC VALVE REPLACEMENT WITH METALLIC VALVE: Primary | ICD-10-CM

## 2023-09-15 LAB — INR PPP: 3

## 2023-09-15 NOTE — PROGRESS NOTES
Anticoagulation Clinic Progress Note    Anticoagulation Summary  As of 9/15/2023      INR goal:  3.0-3.5   TTR:  67.4 % (4.7 y)   INR used for dosing:  3.00 (9/15/2023)   Warfarin maintenance plan:  5 mg every day   Weekly warfarin total:  35 mg   No change documented:  Carmen El PharmD   Plan last modified:  Carmen El PharmD (2/24/2023)   Next INR check:  9/22/2023   Priority:  High   Target end date:  Indefinite    Indications    History of aortic valve replacement with metallic valve [Z95.4]  Atrial fibrillation [I48.91]  Cerebrovascular accident (CVA) due to 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]                 Anticoagulation Episode Summary       INR check location:      Preferred lab:      Send INR reminders to:  [facility]    Comments:  **Home testing training 3/3/23** *CALL EVERY TIME* New INR goal 3 - 3.5 (see 1/2020 hospitalization for CVA)          Anticoagulation Care Providers       Provider Role Specialty Phone number    Griffin Fried MD Referring Cardiology [phone]            Clinic Interview:  Patient Findings     Negatives:  Signs/symptoms of thrombosis, Signs/symptoms of bleeding,   Laboratory test error suspected, Change in health, Change in alcohol use,   Change in activity, Upcoming invasive procedure, Emergency department   visit, Upcoming dental procedure, Missed doses, Extra doses, Change in   medications, Change in diet/appetite, Hospital admission, Bruising, Other   complaints      Clinical Outcomes     Negatives:  Major bleeding event, Thromboembolic event,   Anticoagulation-related hospital admission, Anticoagulation-related ED   visit, Anticoagulation-related fatality        INR History:      8/25/2023     8:00 AM 9/1/2023    12:00 AM 9/1/2023    11:51 AM 9/8/2023    12:00 AM 9/8/2023     8:49 AM 9/15/2023    12:00 AM 9/15/2023     8:51 AM   Anticoagulation Monitoring   INR 4.00  3.90  3.50  3.00   INR Date 8/25/2023 9/1/2023   9/8/2023  9/15/2023   INR Goal 3.0-3.5  3.0-3.5  3.0-3.5  3.0-3.5   Trend Same  Same  Same  Same   Last Week Total 35 mg  32.5 mg  32.5 mg  35 mg   Next Week Total 32.5 mg  32.5 mg  35 mg  35 mg   Sun 5 mg  5 mg  5 mg  5 mg   Mon 5 mg  5 mg  5 mg  5 mg   Tue 5 mg  5 mg  5 mg  5 mg   Wed 5 mg  5 mg  5 mg  5 mg   Thu 5 mg  5 mg  5 mg  5 mg   Fri 2.5 mg (8/25)  2.5 mg (9/1)  5 mg  5 mg   Sat 5 mg  5 mg  5 mg  5 mg   Historical INR  3.90      3.50      3.00        Visit Report  Report Report           This result is from an external source.       Plan:  1. INR is Therapeutic today- see above in Anticoagulation Summary.   Will instruct LAURA Sequeira to Continue their warfarin regimen- see above in Anticoagulation Summary.  2. Follow up in 1 weeks  3. They have been instructed to call if any changes in medications, doses, concerns, etc. Patient expresses understanding and has no further questions at this time.    Carmen El, PharmD

## 2023-09-19 ENCOUNTER — APPOINTMENT (OUTPATIENT)
Dept: PHYSICAL THERAPY | Facility: HOSPITAL | Age: 88
End: 2023-09-19
Payer: COMMERCIAL

## 2023-09-21 ENCOUNTER — APPOINTMENT (OUTPATIENT)
Dept: PHYSICAL THERAPY | Facility: HOSPITAL | Age: 88
End: 2023-09-21
Payer: COMMERCIAL

## 2023-09-22 ENCOUNTER — ANTICOAGULATION VISIT (OUTPATIENT)
Dept: PHARMACY | Facility: HOSPITAL | Age: 88
End: 2023-09-22
Payer: MEDICARE

## 2023-09-22 DIAGNOSIS — I63.411 CEREBROVASCULAR ACCIDENT (CVA) DUE TO EMBOLISM OF RIGHT MIDDLE CEREBRAL ARTERY: ICD-10-CM

## 2023-09-22 DIAGNOSIS — Z95.4 HISTORY OF AORTIC VALVE REPLACEMENT WITH METALLIC VALVE: Primary | ICD-10-CM

## 2023-09-22 LAB — INR PPP: 3.4

## 2023-09-22 PROCEDURE — G0249 PROVIDE INR TEST MATER/EQUIP: HCPCS

## 2023-09-22 NOTE — PROGRESS NOTES
Anticoagulation Clinic Progress Note    Anticoagulation Summary  As of 9/22/2023      INR goal:  3.0-3.5   TTR:  67.6 % (4.7 y)   INR used for dosing:  3.40 (9/22/2023)   Warfarin maintenance plan:  5 mg every day   Weekly warfarin total:  35 mg   No change documented:  Carmen El PharmD   Plan last modified:  Carmen El PharmD (2/24/2023)   Next INR check:  9/29/2023   Priority:  High   Target end date:  Indefinite    Indications    History of aortic valve replacement with metallic valve [Z95.4]  Atrial fibrillation [I48.91]  Cerebrovascular accident (CVA) due to 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]                 Anticoagulation Episode Summary       INR check location:      Preferred lab:      Send INR reminders to:  [facility]    Comments:  **Home testing training 3/3/23** *CALL EVERY TIME* New INR goal 3 - 3.5 (see 1/2020 hospitalization for CVA)          Anticoagulation Care Providers       Provider Role Specialty Phone number    Griffin Fried MD Referring Cardiology [phone]            Clinic Interview:  No pertinent clinical findings have been reported.    INR History:      9/1/2023    11:51 AM 9/8/2023    12:00 AM 9/8/2023     8:49 AM 9/15/2023    12:00 AM 9/15/2023     8:51 AM 9/22/2023    12:00 AM 9/22/2023     8:00 AM   Anticoagulation Monitoring   INR 3.90  3.50  3.00  3.40   INR Date 9/1/2023  9/8/2023  9/15/2023  9/22/2023   INR Goal 3.0-3.5  3.0-3.5  3.0-3.5  3.0-3.5   Trend Same  Same  Same  Same   Last Week Total 32.5 mg  32.5 mg  35 mg  35 mg   Next Week Total 32.5 mg  35 mg  35 mg  35 mg   Sun 5 mg  5 mg  5 mg  5 mg   Mon 5 mg  5 mg  5 mg  5 mg   Tue 5 mg  5 mg  5 mg  5 mg   Wed 5 mg  5 mg  5 mg  5 mg   Thu 5 mg  5 mg  5 mg  5 mg   Fri 2.5 mg (9/1)  5 mg  5 mg  5 mg   Sat 5 mg  5 mg  5 mg  5 mg   Historical INR  3.50      3.00      3.40        Visit Report Report             This result is from an external source.       Plan:  1. INR is  therapeutic today- see above in Anticoagulation Summary.    LAURA Sequeira to continue their warfarin regimen- see above in Anticoagulation Summary.  2. Follow up in 1 week  3.  They have been instructed to call if any changes in medications, doses, concerns, etc. Patient expresses understanding and has no further questions at this time.    Carmen El, PharmD

## 2023-09-26 ENCOUNTER — APPOINTMENT (OUTPATIENT)
Dept: PHYSICAL THERAPY | Facility: HOSPITAL | Age: 88
End: 2023-09-26
Payer: COMMERCIAL

## 2023-09-28 ENCOUNTER — APPOINTMENT (OUTPATIENT)
Dept: PHYSICAL THERAPY | Facility: HOSPITAL | Age: 88
End: 2023-09-28
Payer: COMMERCIAL

## 2023-09-29 ENCOUNTER — ANTICOAGULATION VISIT (OUTPATIENT)
Dept: PHARMACY | Facility: HOSPITAL | Age: 88
End: 2023-09-29
Payer: COMMERCIAL

## 2023-09-29 DIAGNOSIS — I63.411 CEREBROVASCULAR ACCIDENT (CVA) DUE TO EMBOLISM OF RIGHT MIDDLE CEREBRAL ARTERY: ICD-10-CM

## 2023-09-29 DIAGNOSIS — Z95.4 HISTORY OF AORTIC VALVE REPLACEMENT WITH METALLIC VALVE: Primary | ICD-10-CM

## 2023-09-29 LAB — INR PPP: 3.4

## 2023-09-29 NOTE — PROGRESS NOTES
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    INR History:      9/8/2023     8:49 AM 9/15/2023    12:00 AM 9/15/2023     8:51 AM 9/22/2023    12:00 AM 9/22/2023     8:00 AM 9/29/2023    12:00 AM 9/29/2023     8:47 AM   Anticoagulation Monitoring   INR 3.50  3.00  3.40  3.40   INR Date 9/8/2023  9/15/2023  9/22/2023  9/29/2023   INR Goal 3.0-3.5  3.0-3.5  3.0-3.5  3.0-3.5   Trend Same  Same  Same  Same   Last Week Total 32.5 mg  35 mg  35 mg  35 mg   Next Week Total 35 mg  35 mg  35 mg  35 mg   Sun 5 mg  5 mg  5 mg  5 mg   Mon 5 mg  5 mg  5 mg  5 mg   Tue 5 mg  5 mg  5 mg  5 mg   Wed 5 mg  5 mg  5 mg  5 mg   Thu 5 mg  5 mg  5 mg  5 mg   Fri 5 mg  5 mg  5 mg  5 mg   Sat 5 mg  5 mg  5 mg  5 mg   Historical INR  3.00      3.40      3.40            This result is from an external source.       Plan:  1. INR is therapeutic today- see above in  Anticoagulation Summary.    LAURA Sequeira to continue their warfarin regimen- see above in Anticoagulation Summary.  2. Follow up in 1 week  3. They have been instructed to call if any changes in medications, doses, concerns, etc. Patient expresses understanding and has no further questions at this time.    Michelle Piña, [facility]

## 2023-10-03 DIAGNOSIS — E11.69 TYPE 2 DIABETES MELLITUS WITH OTHER SPECIFIED COMPLICATION, UNSPECIFIED WHETHER LONG TERM INSULIN USE: ICD-10-CM

## 2023-10-06 ENCOUNTER — ANTICOAGULATION VISIT (OUTPATIENT)
Dept: PHARMACY | Facility: HOSPITAL | Age: 88
End: 2023-10-06
Payer: COMMERCIAL

## 2023-10-06 DIAGNOSIS — Z95.4 HISTORY OF AORTIC VALVE REPLACEMENT WITH METALLIC VALVE: Primary | ICD-10-CM

## 2023-10-06 DIAGNOSIS — I63.411 CEREBROVASCULAR ACCIDENT (CVA) DUE TO EMBOLISM OF RIGHT MIDDLE CEREBRAL ARTERY: ICD-10-CM

## 2023-10-06 LAB — INR PPP: 3.5

## 2023-10-06 NOTE — PROGRESS NOTES
Anticoagulation Clinic Progress Note    Anticoagulation Summary  As of 10/6/2023      INR goal:  3.0-3.5   TTR:  67.8 % (4.7 y)   INR used for dosing:  3.50 (10/6/2023)   Warfarin maintenance plan:  5 mg every day   Weekly warfarin total:  35 mg   No change documented:  Vasquez Niño, PharmD   Plan last modified:  Carmen El PharmD (2/24/2023)   Next INR check:  10/13/2023   Priority:  High   Target end date:  Indefinite    Indications    History of aortic valve replacement with metallic valve [Z95.4]  Atrial fibrillation [I48.91]  Cerebrovascular accident (CVA) due to 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]                 Anticoagulation Episode Summary       INR check location:      Preferred lab:      Send INR reminders to:  [facility]    Comments:  **Home testing training 3/3/23** *CALL EVERY TIME* New INR goal 3 - 3.5 (see 1/2020 hospitalization for CVA)          Anticoagulation Care Providers       Provider Role Specialty Phone number    Griffin Fried MD Referring Cardiology [phone]            Clinic Interview:  Patient Findings     Positives:  Laboratory test error suspected, Other complaints    Negatives:  Signs/symptoms of thrombosis, Signs/symptoms of bleeding,   Change in health, Change in alcohol use, Change in activity, Upcoming   invasive procedure, Emergency department visit, Upcoming dental procedure,   Missed doses, Extra doses, Change in medications, Change in diet/appetite,   Hospital admission, Bruising    Comments:  Reports issues performing the INR test the first time (machine   errors, too little blood), which resulted at 2.5. She re-performed the   test without any issues/machine errors, and the INR resulted at 3.5.   Denies any changes in diet, meds, or missed doses.       Clinical Outcomes     Negatives:  Major bleeding event, Thromboembolic event,   Anticoagulation-related hospital admission, Anticoagulation-related ED   visit,  Anticoagulation-related fatality    Comments:  Reports issues performing the INR test the first time (machine   errors, too little blood), which resulted at 2.5. She re-performed the   test without any issues/machine errors, and the INR resulted at 3.5.   Denies any changes in diet, meds, or missed doses.         INR History:      9/15/2023     8:51 AM 9/22/2023    12:00 AM 9/22/2023     8:00 AM 9/29/2023    12:00 AM 9/29/2023     8:47 AM 10/6/2023    12:00 AM 10/6/2023     8:02 AM   Anticoagulation Monitoring   INR 3.00  3.40  3.40  3.50   INR Date 9/15/2023  9/22/2023  9/29/2023  10/6/2023   INR Goal 3.0-3.5  3.0-3.5  3.0-3.5  3.0-3.5   Trend Same  Same  Same  Same   Last Week Total 35 mg  35 mg  35 mg  35 mg   Next Week Total 35 mg  35 mg  35 mg  35 mg   Sun 5 mg  5 mg  5 mg  5 mg   Mon 5 mg  5 mg  5 mg  5 mg   Tue 5 mg  5 mg  5 mg  5 mg   Wed 5 mg  5 mg  5 mg  5 mg   Thu 5 mg  5 mg  5 mg  5 mg   Fri 5 mg  5 mg  5 mg  5 mg   Sat 5 mg  5 mg  5 mg  5 mg   Historical INR  3.40      3.40      3.50  C          C Corrected result    This result is from an external source.       Plan:  1. INR is Therapeutic today- see above in Anticoagulation Summary.   Will instruct LAURA Sequeira to Continue their warfarin regimen- see above in Anticoagulation Summary.  2. Follow up in 1 week.  3. They have been instructed to call if any changes in medications, doses, concerns, etc. Patient expresses understanding and has no further questions at this time.    Vasquez Niño, PharmD

## 2023-10-08 RX ORDER — FLASH GLUCOSE SENSOR
KIT MISCELLANEOUS
Qty: 6 EACH | Refills: 3 | Status: SHIPPED | OUTPATIENT
Start: 2023-10-08

## 2023-10-13 ENCOUNTER — ANTICOAGULATION VISIT (OUTPATIENT)
Dept: PHARMACY | Facility: HOSPITAL | Age: 88
End: 2023-10-13
Payer: COMMERCIAL

## 2023-10-13 DIAGNOSIS — I63.411 CEREBROVASCULAR ACCIDENT (CVA) DUE TO EMBOLISM OF RIGHT MIDDLE CEREBRAL ARTERY: ICD-10-CM

## 2023-10-13 DIAGNOSIS — Z95.4 HISTORY OF AORTIC VALVE REPLACEMENT WITH METALLIC VALVE: Primary | ICD-10-CM

## 2023-10-13 LAB — INR PPP: 3.2

## 2023-10-13 NOTE — PROGRESS NOTES
Anticoagulation Clinic Progress Note    Anticoagulation Summary  As of 10/13/2023      INR goal:  3.0-3.5   TTR:  68.0% (4.7 y)   INR used for dosing:  3.20 (10/13/2023)   Warfarin maintenance plan:  5 mg every day   Weekly warfarin total:  35 mg   No change documented:  Vasquez Niño, PharmD   Plan last modified:  Carmen El PharmD (2/24/2023)   Next INR check:  10/20/2023   Priority:  High   Target end date:  Indefinite    Indications    History of aortic valve replacement with metallic valve [Z95.4]  Atrial fibrillation [I48.91]  Cerebrovascular accident (CVA) due to 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]                 Anticoagulation Episode Summary       INR check location:      Preferred lab:      Send INR reminders to:  [facility]    Comments:  **Home testing training 3/3/23** *CALL EVERY TIME* New INR goal 3 - 3.5 (see 1/2020 hospitalization for CVA)          Anticoagulation Care Providers       Provider Role Specialty Phone number    Griffin Fried MD Referring Cardiology [phone]            Clinic Interview:  Patient Findings     Positives:  Change in alcohol use    Negatives:  Signs/symptoms of thrombosis, Signs/symptoms of bleeding,   Laboratory test error suspected, Change in health, Change in activity,   Upcoming invasive procedure, Emergency department visit, Upcoming dental   procedure, Missed doses, Extra doses, Change in medications, Change in   diet/appetite, Hospital admission, Bruising, Other complaints    Comments:  Reports 1 alcoholic drink two days ago.       Clinical Outcomes     Negatives:  Major bleeding event, Thromboembolic event,   Anticoagulation-related hospital admission, Anticoagulation-related ED   visit, Anticoagulation-related fatality    Comments:  Reports 1 alcoholic drink two days ago.         INR History:      9/22/2023     8:00 AM 9/29/2023    12:00 AM 9/29/2023     8:47 AM 10/6/2023    12:00 AM 10/6/2023     8:02 AM 10/13/2023     12:00 AM 10/13/2023     8:16 AM   Anticoagulation Monitoring   INR 3.40  3.40  3.50  3.20   INR Date 9/22/2023  9/29/2023  10/6/2023  10/13/2023   INR Goal 3.0-3.5  3.0-3.5  3.0-3.5  3.0-3.5   Trend Same  Same  Same  Same   Last Week Total 35 mg  35 mg  35 mg  35 mg   Next Week Total 35 mg  35 mg  35 mg  35 mg   Sun 5 mg  5 mg  5 mg  5 mg   Mon 5 mg  5 mg  5 mg  5 mg   Tue 5 mg  5 mg  5 mg  5 mg   Wed 5 mg  5 mg  5 mg  5 mg   Thu 5 mg  5 mg  5 mg  5 mg   Fri 5 mg  5 mg  5 mg  5 mg   Sat 5 mg  5 mg  5 mg  5 mg   Historical INR  3.40      3.50  C     3.20           C Corrected result    This result is from an external source.       Plan:  1. INR is Therapeutic today- see above in Anticoagulation Summary.   Will instruct LAURA Sequeira to Continue their warfarin regimen- see above in Anticoagulation Summary.  2. Follow up in 1 week.  3. They have been instructed to call if any changes in medications, doses, concerns, etc. Patient expresses understanding and has no further questions at this time.    Vasquez Niño, PharmD

## 2023-10-20 ENCOUNTER — OFFICE VISIT (OUTPATIENT)
Dept: INTERNAL MEDICINE | Facility: CLINIC | Age: 88
End: 2023-10-20
Payer: MEDICARE

## 2023-10-20 ENCOUNTER — ANTICOAGULATION VISIT (OUTPATIENT)
Dept: PHARMACY | Facility: HOSPITAL | Age: 88
End: 2023-10-20
Payer: MEDICARE

## 2023-10-20 VITALS
SYSTOLIC BLOOD PRESSURE: 160 MMHG | OXYGEN SATURATION: 98 % | HEIGHT: 72 IN | BODY MASS INDEX: 22.35 KG/M2 | HEART RATE: 83 BPM | WEIGHT: 165 LBS | DIASTOLIC BLOOD PRESSURE: 80 MMHG

## 2023-10-20 DIAGNOSIS — I63.411 CEREBROVASCULAR ACCIDENT (CVA) DUE TO EMBOLISM OF RIGHT MIDDLE CEREBRAL ARTERY: ICD-10-CM

## 2023-10-20 DIAGNOSIS — E11.22 TYPE 2 DIABETES MELLITUS WITH STAGE 3B CHRONIC KIDNEY DISEASE, WITH LONG-TERM CURRENT USE OF INSULIN: ICD-10-CM

## 2023-10-20 DIAGNOSIS — N18.32 TYPE 2 DIABETES MELLITUS WITH STAGE 3B CHRONIC KIDNEY DISEASE, WITH LONG-TERM CURRENT USE OF INSULIN: ICD-10-CM

## 2023-10-20 DIAGNOSIS — D50.9 IRON DEFICIENCY ANEMIA, UNSPECIFIED IRON DEFICIENCY ANEMIA TYPE: ICD-10-CM

## 2023-10-20 DIAGNOSIS — Z00.00 MEDICARE ANNUAL WELLNESS VISIT, SUBSEQUENT: Primary | ICD-10-CM

## 2023-10-20 DIAGNOSIS — R41.3 MEMORY LOSS: ICD-10-CM

## 2023-10-20 DIAGNOSIS — Z79.4 TYPE 2 DIABETES MELLITUS WITH STAGE 3B CHRONIC KIDNEY DISEASE, WITH LONG-TERM CURRENT USE OF INSULIN: ICD-10-CM

## 2023-10-20 DIAGNOSIS — Z95.4 HISTORY OF AORTIC VALVE REPLACEMENT WITH METALLIC VALVE: Primary | ICD-10-CM

## 2023-10-20 LAB — INR PPP: 3.2

## 2023-10-20 PROCEDURE — G0249 PROVIDE INR TEST MATER/EQUIP: HCPCS

## 2023-10-20 RX ORDER — ERGOCALCIFEROL 1.25 MG/1
50000 CAPSULE ORAL WEEKLY
COMMUNITY

## 2023-10-20 NOTE — PROGRESS NOTES
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    INR History:      9/29/2023     8:47 AM 10/6/2023    12:00 AM 10/6/2023     8:02 AM 10/13/2023    12:00 AM 10/13/2023     8:16 AM 10/20/2023    12:00 AM 10/20/2023     8:30 AM   Anticoagulation Monitoring   INR 3.40  3.50  3.20  3.20   INR Date 9/29/2023  10/6/2023  10/13/2023  10/20/2023   INR Goal 3.0-3.5  3.0-3.5  3.0-3.5  3.0-3.5   Trend Same  Same  Same  Same   Last Week Total 35 mg  35 mg  35 mg  35 mg   Next Week Total 35 mg  35 mg  35 mg  35 mg   Sun 5 mg  5 mg  5 mg  5 mg   Mon 5 mg  5 mg  5 mg  5 mg   Tue 5 mg  5 mg  5 mg  5 mg   Wed 5 mg  5 mg  5 mg  5 mg   Thu 5 mg  5 mg  5 mg  5 mg   Fri 5 mg  5 mg  5 mg  5 mg   Sat 5 mg  5 mg  5 mg  5 mg   Historical INR  3.50  C     3.20      3.20           C Corrected result    This result is from an external source.       Plan:  1. INR is  therapeutic today- see above in Anticoagulation Summary.    LAURA Sequeira to continue their warfarin regimen- see above in Anticoagulation Summary.  2. Follow up in 1 week  3. They have been instructed to call if any changes in medications, doses, concerns, etc. Patient expresses understanding and has no further questions at this time.    Carmen El, PharmD

## 2023-10-20 NOTE — PROGRESS NOTES
The ABCs of the Annual Wellness Visit  Subsequent Medicare Wellness Visit    Subjective      LAURA Sequeira is a 87 y.o. male who presents for a Subsequent Medicare Wellness Visit.    The following portions of the patient's history were reviewed and   updated as appropriate: allergies, current medications, past family history, past medical history, past social history, past surgical history, and problem list.    Here today with his wife who helps provide history.    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 Currently doing home exercises.       Allergies: benadryl and mucinex    Kidney carcinoma s/p nephrectomy in 2006. Did not require chemotherapy or radiation.      Prostate cancer s/p prostate removal with urology in 2006. Follows with [facility] yearly .    CKD3b: follows with Dr Dominguez with nephrology    CVA/HLD: takes atorvastatin 40 mg daily     GERD: takes famotidine 20 mg daily     Iron deficiency anemia, GI Bleed: required hospitalization twice , first in 10/2021 and again in 11/2021. Previously took ferrous gluconate every other day.     Type 2 diabetes: still using VGO 40 , 0-4 4 clicks per day on average. Fasting sugar averages .  He uses a freestyle lisa. He has a Gvoke PypoPen. No new symptoms of low blood sugar. No episodes of hypoglycemia in the last 3 months.      Mechanical Aortic valve replacement 2004/Chronic atrial fibrillation/HTN : follows with [facility] and the anticoagulation clinic for warfarin management. Takes digoxin 62.5 mcg daily, metoprolol succinate 25 mg daily.    Compared to one year ago, the patient feels his physical   health is the same.    Compared to one year ago, the patient feels his mental   health is the same.    States he cannot remember anything. States has noticed  "this over 2 years. Has hundreds of autographs at home that he cannot remember the names of the people on them. Wife describes his memory as \"spotty\". Wife states he has a hard time remembering the name of what he wants to eat in the morning. Wife states he can usually get to information and sometimes has to jog his memory . Wife does all the finances around the house.         Recent Hospitalizations:  This patient has had a [facility] admission record on file within the last 365 days.    Current Medical Providers:  Patient Care Team:  Celestine English DO as PCP - General (Internal Medicine)  Audra Vazquez RPH as Pharmacist  Vasquez Niño, PharmD as Pharmacist (Pharmacy)  Tatiana Tinoco MD as Consulting Physician (Gastroenterology)    Outpatient Medications Prior to Visit   Medication Sig Dispense Refill    atorvastatin (LIPITOR) 40 MG tablet TAKE ONE TABLET BY MOUTH DAILY (Patient taking differently: Take 1 tablet by mouth Daily.) 90 tablet 1    Continuous Blood Gluc Sensor (FreeStyle Vance 14 Day Sensor) [facility] [place] EVERY 14 DAYS 6 each 3    digoxin (LANOXIN) 125 MCG tablet Take 0.5 tablets by mouth Daily. 45 tablet 1    diphenhydrAMINE (BENADRYL) 25 mg capsule Take 1 capsule by mouth 2 (Two) Times a Day As Needed for Itching, Allergies or Sleep.      famotidine (PEPCID) 20 MG tablet Take 1 tablet by mouth Daily. 90 tablet 1    Glucagon (Gvoke HypoPen 1-Pack) 1 MG/0.2ML solution auto-injector Inject 1 mg under the skin into the appropriate area as directed 1 (One) Time As Needed (for symptoms of low blood sugar. Report to ER immediately after use.) for up to 1 dose. 0.2 mL 1    guaiFENesin (MUCINEX) 600 MG 12 hr tablet Take 1 tablet by mouth.      Insulin Disposable Pump (V-Go 40) 40 UNIT/24HR kit 1 each Daily. 30 kit 5    Insulin Lispro (HumaLOG) 100 UNIT/ML injection Use in V-GO 40 system to administer 40 to 100 units of insulin daily. 30 mL 5    magnesium oxide (MAG-OX) 400 MG tablet Take 1 tablet by mouth 2 " (Two) Times a Day.      metoprolol succinate XL (TOPROL-XL) 25 MG 24 hr tablet Take 1 tablet by mouth Daily. 90 tablet 1    vitamin D (ERGOCALCIFEROL) 1.25 MG (42374 UT) capsule capsule Take 1 capsule by mouth 1 (One) Time Per Week.      warfarin (COUMADIN) 5 MG tablet TAKE ONE TABLET BY MOUTH DAILY AS DIRECTED 90 tablet 1    acetaminophen (TYLENOL) 325 MG tablet Take 2 tablets by mouth Every 6 (Six) Hours As Needed for Mild Pain . (Patient not taking: Reported on 10/20/2023)       No facility-administered medications prior to visit.       No opioid medication identified on active medication list. I have reviewed chart for other potential  high risk medication/s and harmful drug interactions in the elderly.        Aspirin is not on active medication list.  Aspirin use is contraindicated for this patient due to: current use of warfarin.  .    Patient Active Problem List   Diagnosis    Atrial fibrillation    Hypertension    Atopic rhinitis    Gastroesophageal reflux disease    Hyperlipidemia    Type 2 diabetes mellitus    Low testosterone    History of aortic valve replacement with metallic valve    Kidney carcinoma    Stage 3b chronic kidney disease    Cerebrovascular accident (CVA) due to embolism of right middle cerebral artery    Iron deficiency anemia    Chronic anticoagulation    Hemiparesis of left nondominant side as late effect of cerebral infarction    Rectus sheath hematoma    Hospital discharge follow-up    Sepsis    Calculus of gallbladder with acute cholecystitis without obstruction    History of Clostridium difficile infection    Aspiration pneumonitis    Hematoma of iliopsoas muscle, left, initial encounter    History of CVA (cerebrovascular accident)    S/P laparoscopic cholecystectomy    Anemia    Hematoma of left iliopsoas muscle    Pneumonia of right lower lobe due to infectious organism    Supratherapeutic INR     Advance Care Planning   Advance Care Planning     Advance Directive is not on file.   "ACP discussion was held with the patient during this visit. Patient has an advance directive (not in EMR), copy requested.     Objective    Vitals:    10/20/23 1300   BP: 160/80   Pulse: 83   SpO2: 98%   Weight: 74.8 kg (165 lb)   Height: 182.9 cm (72\")     Physical Exam  Vitals reviewed.   Constitutional:       General: He is not in acute distress.     Appearance: He is ill-appearing (chronically).   HENT:      Head: Atraumatic.   Eyes:      General: No scleral icterus.  Pulmonary:      Effort: Pulmonary effort is normal. No respiratory distress.   Skin:     Coloration: Skin is not jaundiced.   Neurological:      Mental Status: He is alert.   Psychiatric:         Mood and Affect: Mood normal.         Behavior: Behavior normal.      Comments: Sometimes looks to his wife for answers             Estimated body mass index is 22.38 kg/m² as calculated from the following:    Height as of this encounter: 182.9 cm (72\").    Weight as of this encounter: 74.8 kg (165 lb).    BMI is within normal parameters. No other follow-up for BMI required.      Does the patient have evidence of cognitive impairment?   Yes: see plan below            HEALTH RISK ASSESSMENT    Smoking Status:  Social History     Tobacco Use   Smoking Status Former    Packs/day: 1.00    Years: 25.00    Additional pack years: 0.00    Total pack years: 25.00    Types: Cigarettes    Quit date: 1970    Years since quittin.8    Passive exposure: Never   Smokeless Tobacco Former     Alcohol Consumption:  Social History     Substance and Sexual Activity   Alcohol Use Yes    Alcohol/week: 0.0 - 1.0 standard drinks of alcohol     Fall Risk Screen:    STEADI Fall Risk Assessment was completed, and patient is at MODERATE risk for falls. Assessment completed on:10/20/2023    Depression Screening:      10/20/2023     1:12 PM   PHQ-2/PHQ-9 Depression Screening   Little Interest or Pleasure in Doing Things 0-->not at all   Feeling Down, Depressed or Hopeless 0-->not " at all   PHQ-9: Brief Depression Severity Measure Score 0       Health Habits and Functional and Cognitive Screening:      10/20/2023     1:06 PM   Functional & Cognitive Status   Do you have difficulty preparing food and eating? No   Do you have difficulty bathing yourself, getting dressed or grooming yourself? No   Do you have difficulty using the toilet? No   Do you have difficulty moving around from place to place? Yes   Do you have trouble with steps or getting out of a bed or a chair? Yes   Current Diet Other   Dental Exam Not up to date   Eye Exam Up to date   Exercise (times per week) 2 times per week   Current Exercises Include Weightlifting        Exercise Comment leg exercises and arm exercises   Do you need help using the phone?  No   Are you deaf or do you have serious difficulty hearing?  Yes   Do you need help to go to places out of walking distance? Yes   Do you need help shopping? No   Do you need help preparing meals?  No   Do you need help with housework?  No   Do you need help with laundry? No   Do you need help taking your medications? Yes   Do you need help managing money? No   Do you ever drive or ride in a car without wearing a seat belt? No   Have you felt unusual stress, anger or loneliness in the last month? No   Who do you live with? Spouse   If you need help, do you have trouble finding someone available to you? No   Do you have difficulty concentrating, remembering or making decisions? Yes       Age-appropriate Screening Schedule:  Refer to the list below for future screening recommendations based on patient's age, sex and/or medical conditions. Orders for these recommended tests are listed in the plan section. The patient has been provided with a written plan.    Health Maintenance   Topic Date Due    TDAP/TD VACCINES (1 - Tdap) Never done    ZOSTER VACCINE (2 of 2) 02/26/2015    DIABETIC EYE EXAM  10/01/2017    URINE MICROALBUMIN  08/28/2021    LIPID PANEL  10/24/2022    COVID-19  Vaccine (5 - 2023-24 season) 09/01/2023    HEMOGLOBIN A1C  12/16/2023    ANNUAL WELLNESS VISIT  10/20/2024    INFLUENZA VACCINE  Completed    Pneumococcal Vaccine 65+  Completed                  CMS Preventative Services Quick Reference  Risk Factors Identified During Encounter:    Immunizations Discussed/Encouraged: Shingrix and COVID19  Polypharmacy: Medication List reviewed  Dental Screening Recommended  Vision Screening Recommended    The above risks/problems have been discussed with the patient.  Pertinent information has been shared with the patient in the After Visit Summary.      Follow Up:   Next Medicare Wellness visit to be scheduled in 1 year.      An After Visit Summary and PPPS were made available to the patient.    A problem-based visit was also conducted on the same day, see below for assessment and plan    Diagnoses and all orders for this visit:    1. Type 2 diabetes mellitus with stage 3b chronic kidney disease, with long-term current use of insulin (Primary)  -A1c trends on file for this patient:   A1C Last 3 Results          3/10/2023    10:32 6/16/2023    09:41   HGBA1C Last 3 Results   Hemoglobin A1C 7.30  7.60      -Goal A1c for this patient is less than 8%  -Current diabetes regimen: still using VGO 40 , 0-4 4 clicks per day on average. Fasting sugar averages .  He uses a freestyle lisa. He has a Gvoke PypoPen. No new symptoms of low blood sugar. No episodes of hypoglycemia in the last 3 months.   -Changes made to diabetes regimen today: recheck A1c, previously at goal. He is high risk for hypoglycemia due to memory issues and age but his wife is very active in his care and they are very comfortable with the VGO system and prefer to stay on it as opposed to going on oral medications. Continue to monitor for hypoglycemia at every visit.   -Diabetic kidney disease screening:update today      -check CMP         -obtain copy of diabetic eye exam    2. Memory loss  -States he cannot  "remember anything. States has noticed this over 2 years. Has hundreds of autographs at home that he cannot remember the names of the people on them. Wife describes his memory as \"spotty\". Wife states he has a hard time remembering the name of what he wants to eat in the morning. Wife states he can usually get to information and sometimes has to jog his memory . Wife does all the finances around the house.   -MMSE low at 19 today  -head CT 2022 with diffuse atrophy, chronic ischemic changes  -will check TSH, B12, syphilis screening  -discussed with pt that MMSE score is consistent with mild cognitive impairment but he needs a neuropsych eval to fully diagnose the level of memory loss he has. He is very hesitant but his wife is interested. I provided them with self referral information should they choose to see neuropsych.   -     TSH Rfx On Abnormal To Free T4  -     Vitamin B12  -     T. Pallidum (Syphilis) Screening [facility]    3. Iron deficiency anemia, unspecified iron deficiency anemia type  -     CBC & Differential  -     Iron Profile  -     Ferritin            The following social determinates of health impact the patient's medical decision making: No social determinates of health were factored in to today's visit.     Follow Up  Return in about 3 months (around 1/20/2024) for Recheck.          "

## 2023-10-23 LAB
ALBUMIN SERPL-MCNC: 4.1 G/DL (ref 3.5–5.2)
ALBUMIN/CREAT UR: 317 MG/G CREAT (ref 0–29)
ALBUMIN/GLOB SERPL: 1.2 G/DL
ALP SERPL-CCNC: 135 U/L (ref 39–117)
ALT SERPL-CCNC: 10 U/L (ref 1–41)
AST SERPL-CCNC: 10 U/L (ref 1–40)
BASOPHILS # BLD AUTO: 0.05 10*3/MM3 (ref 0–0.2)
BASOPHILS NFR BLD AUTO: 0.8 % (ref 0–1.5)
BILIRUB SERPL-MCNC: 0.3 MG/DL (ref 0–1.2)
BUN SERPL-MCNC: 33 MG/DL (ref 8–23)
BUN/CREAT SERPL: 21.3 (ref 7–25)
CALCIUM SERPL-MCNC: 9.1 MG/DL (ref 8.6–10.5)
CHLORIDE SERPL-SCNC: 105 MMOL/L (ref 98–107)
CO2 SERPL-SCNC: 25.1 MMOL/L (ref 22–29)
CREAT SERPL-MCNC: 1.55 MG/DL (ref 0.76–1.27)
CREAT UR-MCNC: 61.5 MG/DL
EGFRCR SERPLBLD CKD-EPI 2021: 43.6 ML/MIN/1.73
EOSINOPHIL # BLD AUTO: 0.18 10*3/MM3 (ref 0–0.4)
EOSINOPHIL NFR BLD AUTO: 2.8 % (ref 0.3–6.2)
ERYTHROCYTE [DISTWIDTH] IN BLOOD BY AUTOMATED COUNT: 15.9 % (ref 12.3–15.4)
FERRITIN SERPL-MCNC: 99.6 NG/ML (ref 30–400)
GLOBULIN SER CALC-MCNC: 3.3 GM/DL
GLUCOSE SERPL-MCNC: 223 MG/DL (ref 65–99)
HBA1C MFR BLD: 7.8 % (ref 4.8–5.6)
HCT VFR BLD AUTO: 41.4 % (ref 37.5–51)
HGB BLD-MCNC: 13 G/DL (ref 13–17.7)
IMM GRANULOCYTES # BLD AUTO: 0.01 10*3/MM3 (ref 0–0.05)
IMM GRANULOCYTES NFR BLD AUTO: 0.2 % (ref 0–0.5)
IRON SATN MFR SERPL: 12 % (ref 20–50)
IRON SERPL-MCNC: 37 MCG/DL (ref 59–158)
LYMPHOCYTES # BLD AUTO: 0.96 10*3/MM3 (ref 0.7–3.1)
LYMPHOCYTES NFR BLD AUTO: 14.8 % (ref 19.6–45.3)
MCH RBC QN AUTO: 25.6 PG (ref 26.6–33)
MCHC RBC AUTO-ENTMCNC: 31.4 G/DL (ref 31.5–35.7)
MCV RBC AUTO: 81.5 FL (ref 79–97)
MICROALBUMIN UR-MCNC: 195 UG/ML
MONOCYTES # BLD AUTO: 0.44 10*3/MM3 (ref 0.1–0.9)
MONOCYTES NFR BLD AUTO: 6.8 % (ref 5–12)
NEUTROPHILS # BLD AUTO: 4.83 10*3/MM3 (ref 1.7–7)
NEUTROPHILS NFR BLD AUTO: 74.6 % (ref 42.7–76)
NRBC BLD AUTO-RTO: 0 /100 WBC (ref 0–0.2)
PLATELET # BLD AUTO: 181 10*3/MM3 (ref 140–450)
POTASSIUM SERPL-SCNC: 4.9 MMOL/L (ref 3.5–5.2)
PROT SERPL-MCNC: 7.4 G/DL (ref 6–8.5)
RBC # BLD AUTO: 5.08 10*6/MM3 (ref 4.14–5.8)
SODIUM SERPL-SCNC: 142 MMOL/L (ref 136–145)
TIBC SERPL-MCNC: 319 MCG/DL
TREPONEMA PALLIDUM IGG+IGM AB [PRESENCE] IN SERUM OR PLASMA BY IMMUNOASSAY: NON REACTIVE
TSH SERPL DL<=0.005 MIU/L-ACNC: 3.51 UIU/ML (ref 0.27–4.2)
UIBC SERPL-MCNC: 282 MCG/DL (ref 112–346)
VIT B12 SERPL-MCNC: 444 PG/ML (ref 211–946)
WBC # BLD AUTO: 6.47 10*3/MM3 (ref 3.4–10.8)

## 2023-10-23 RX ORDER — FERROUS GLUCONATE 324(37.5)
324 TABLET ORAL EVERY OTHER DAY
Qty: 45 TABLET | Refills: 1 | Status: SHIPPED | OUTPATIENT
Start: 2023-10-23

## 2023-10-27 ENCOUNTER — ANTICOAGULATION VISIT (OUTPATIENT)
Dept: PHARMACY | Facility: HOSPITAL | Age: 88
End: 2023-10-27
Payer: COMMERCIAL

## 2023-10-27 DIAGNOSIS — Z95.4 HISTORY OF AORTIC VALVE REPLACEMENT WITH METALLIC VALVE: Primary | ICD-10-CM

## 2023-10-27 DIAGNOSIS — I63.411 CEREBROVASCULAR ACCIDENT (CVA) DUE TO EMBOLISM OF RIGHT MIDDLE CEREBRAL ARTERY: ICD-10-CM

## 2023-10-27 LAB — INR PPP: 3.2

## 2023-10-27 NOTE — PROGRESS NOTES
Anticoagulation Clinic Progress Note    Anticoagulation Summary  As of 10/27/2023      INR goal:  3.0-3.5   TTR:  68.2% (4.8 y)   INR used for dosing:  3.20 (10/27/2023)   Warfarin maintenance plan:  5 mg every day   Weekly warfarin total:  35 mg   No change documented:  Michelle Piña, [facility]   Plan last modified:  Carmen El, PharmD (2/24/2023)   Next INR check:  11/3/2023   Priority:  High   Target end date:  Indefinite    Indications    History of aortic valve replacement with metallic valve [Z95.4]  Atrial fibrillation [I48.91]  Cerebrovascular accident (CVA) due to 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]                 Anticoagulation Episode Summary       INR check location:      Preferred lab:      Send INR reminders to:  [facility] TEST [facility]    Comments:  **Home testing training 3/3/23** *CALL EVERY TIME* New INR goal 3 - 3.5 (see 1/2020 hospitalization for CVA)          Anticoagulation Care Providers       Provider Role Specialty Phone number    Griffin Fried MD Referring Cardiology [phone]            Clinic Interview:  No pertinent clinical findings have been reported.    INR History:      10/6/2023     8:02 AM 10/13/2023    12:00 AM 10/13/2023     8:16 AM 10/20/2023    12:00 AM 10/20/2023     8:30 AM 10/27/2023    12:00 AM 10/27/2023     8:18 AM   Anticoagulation Monitoring   INR 3.50  3.20  3.20  3.20   INR Date 10/6/2023  10/13/2023  10/20/2023  10/27/2023   INR Goal 3.0-3.5  3.0-3.5  3.0-3.5  3.0-3.5   Trend Same  Same  Same  Same   Last Week Total 35 mg  35 mg  35 mg  35 mg   Next Week Total 35 mg  35 mg  35 mg  35 mg   Sun 5 mg  5 mg  5 mg  5 mg   Mon 5 mg  5 mg  5 mg  5 mg   Tue 5 mg  5 mg  5 mg  5 mg   Wed 5 mg  5 mg  5 mg  5 mg   Thu 5 mg  5 mg  5 mg  5 mg   Fri 5 mg  5 mg  5 mg  5 mg   Sat 5 mg  5 mg  5 mg  5 mg   Historical INR  3.20      3.20      3.20  C       Visit Report    Report Report        C Corrected result    This result is from an external source.        Plan:  1. INR is therapeutic today- see above in Anticoagulation Summary.    LAURA Sequeira to continue their warfarin regimen- see above in Anticoagulation Summary.  2. Follow up in 1 week  3.  They have been instructed to call if any changes in medications, doses, concerns, etc. Patient expresses understanding and has no further questions at this time.    Michelle Piña, [facility]

## 2023-11-06 ENCOUNTER — ANTICOAGULATION VISIT (OUTPATIENT)
Dept: PHARMACY | Facility: HOSPITAL | Age: 88
End: 2023-11-06
Payer: COMMERCIAL

## 2023-11-06 DIAGNOSIS — I48.21 PERMANENT ATRIAL FIBRILLATION: ICD-10-CM

## 2023-11-06 DIAGNOSIS — Z95.4 HISTORY OF AORTIC VALVE REPLACEMENT WITH METALLIC VALVE: Primary | ICD-10-CM

## 2023-11-06 DIAGNOSIS — I63.411 CEREBROVASCULAR ACCIDENT (CVA) DUE TO EMBOLISM OF RIGHT MIDDLE CEREBRAL ARTERY: ICD-10-CM

## 2023-11-06 LAB — INR PPP: 3.6

## 2023-11-06 RX ORDER — ATORVASTATIN CALCIUM 40 MG/1
40 TABLET, FILM COATED ORAL DAILY
Qty: 90 TABLET | Refills: 1 | Status: SHIPPED | OUTPATIENT
Start: 2023-11-06

## 2023-11-06 NOTE — PROGRESS NOTES
Anticoagulation Clinic Progress Note    Anticoagulation Summary  As of 11/6/2023      INR goal:  3.0-3.5   TTR:  68.3% (4.8 y)   INR used for dosing:  3.60 (11/6/2023)   Warfarin maintenance plan:  5 mg every day   Weekly warfarin total:  35 mg   No change documented:  Carmen El PharmD   Plan last modified:  Carmen El PharmD (2/24/2023)   Next INR check:  11/10/2023   Priority:  High   Target end date:  Indefinite    Indications    History of aortic valve replacement with metallic valve [Z95.4]  Atrial fibrillation [I48.91]  Cerebrovascular accident (CVA) due to 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]                 Anticoagulation Episode Summary       INR check location:      Preferred lab:      Send INR reminders to:  [facility] TEST PO[facility]    Comments:  **Home testing training 3/3/23** *CALL EVERY TIME* New INR goal 3 - 3.5 (see 1/2020 hospitalization for CVA)          Anticoagulation Care Providers       Provider Role Specialty Phone number    Griffin Fried MD Referring Cardiology [phone]            Clinic Interview:  Patient Findings     Negatives:  Signs/symptoms of thrombosis, Signs/symptoms of bleeding,   Laboratory test error suspected, Change in health, Change in alcohol use,   Change in activity, Upcoming invasive procedure, Emergency department   visit, Upcoming dental procedure, Missed doses, Extra doses, Change in   medications, Change in diet/appetite, Hospital admission, Bruising, Other   complaints      Clinical Outcomes     Negatives:  Major bleeding event, Thromboembolic event,   Anticoagulation-related hospital admission, Anticoagulation-related ED   visit, Anticoagulation-related fatality        INR History:      10/13/2023     8:16 AM 10/20/2023    12:00 AM 10/20/2023     8:30 AM 10/27/2023    12:00 AM 10/27/2023     8:18 AM 11/6/2023    12:00 AM 11/6/2023     8:33 AM   Anticoagulation Monitoring   INR 3.20  3.20  3.20  3.60   INR Date 10/13/2023   10/20/2023  10/27/2023  11/6/2023   INR Goal 3.0-3.5  3.0-3.5  3.0-3.5  3.0-3.5   Trend Same  Same  Same  Same   Last Week Total 35 mg  35 mg  35 mg  35 mg   Next Week Total 35 mg  35 mg  35 mg  35 mg   Sun 5 mg  5 mg  5 mg  -   Mon 5 mg  5 mg  5 mg  5 mg   Tue 5 mg  5 mg  5 mg  5 mg   Wed 5 mg  5 mg  5 mg  5 mg   Thu 5 mg  5 mg  5 mg  5 mg   Fri 5 mg  5 mg  5 mg  -   Sat 5 mg  5 mg  5 mg  -   Historical INR  3.20      3.20  C     3.60        Visit Report  Report Report          C Corrected result    This result is from an external source.       Plan:  1. INR is Supratherapeutic today- see above in Anticoagulation Summary.   Will instruct LAURA Sequeira to Continue their warfarin regimen- see above in Anticoagulation Summary.  2. Follow up in 1 weeks  3. S/w patient's spouse, Gladys. They have been instructed to call if any changes in medications, doses, concerns, etc. Patient expresses understanding and has no further questions at this time.    Carmen El, PharmD

## 2023-11-10 ENCOUNTER — ANTICOAGULATION VISIT (OUTPATIENT)
Dept: PHARMACY | Facility: HOSPITAL | Age: 88
End: 2023-11-10
Payer: COMMERCIAL

## 2023-11-10 DIAGNOSIS — Z95.4 HISTORY OF AORTIC VALVE REPLACEMENT WITH METALLIC VALVE: Primary | ICD-10-CM

## 2023-11-10 DIAGNOSIS — I63.411 CEREBROVASCULAR ACCIDENT (CVA) DUE TO EMBOLISM OF RIGHT MIDDLE CEREBRAL ARTERY: ICD-10-CM

## 2023-11-10 LAB — INR PPP: 3.9

## 2023-11-10 NOTE — PROGRESS NOTES
Anticoagulation Clinic Progress Note    Anticoagulation Summary  As of 11/10/2023      INR goal:  3.0-3.5   TTR:  68.1% (4.8 y)   INR used for dosing:  3.90 (11/10/2023)   Warfarin maintenance plan:  5 mg every day   Weekly warfarin total:  35 mg   Plan last modified:  Carmen El, PharmD (2/24/2023)   Next INR check:  11/17/2023   Priority:  High   Target end date:  Indefinite    Indications    History of aortic valve replacement with metallic valve [Z95.4]  Atrial fibrillation [I48.91]  Cerebrovascular accident (CVA) due to 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]                 Anticoagulation Episode Summary       INR check location:      Preferred lab:      Send INR reminders to:  [facility] TEST POOL    Comments:  **Home testing training 3/3/23** *CALL EVERY TIME* New INR goal 3 - 3.5 (see 1/2020 hospitalization for CVA)          Anticoagulation Care Providers       Provider Role Specialty Phone number    Griffin Fried MD Referring Cardiology [phone]            Clinic Interview:  Patient Findings     Negatives:  Signs/symptoms of thrombosis, Signs/symptoms of bleeding,   Laboratory test error suspected, Change in health, Change in alcohol use,   Change in activity, Upcoming invasive procedure, Emergency department   visit, Upcoming dental procedure, Missed doses, Extra doses, Change in   medications, Change in diet/appetite, Hospital admission, Bruising, Other   complaints      Clinical Outcomes     Negatives:  Major bleeding event, Thromboembolic event,   Anticoagulation-related hospital admission, Anticoagulation-related ED   visit, Anticoagulation-related fatality        INR History:      10/20/2023     8:30 AM 10/27/2023    12:00 AM 10/27/2023     8:18 AM 11/6/2023    12:00 AM 11/6/2023     8:33 AM 11/10/2023    12:00 AM 11/10/2023    11:22 AM   Anticoagulation Monitoring   INR 3.20  3.20  3.60  3.90   INR Date 10/20/2023  10/27/2023  11/6/2023  11/10/2023   INR Goal  3.0-3.5  3.0-3.5  3.0-3.5  3.0-3.5   Trend Same  Same  Same  Same   Last Week Total 35 mg  35 mg  35 mg  35 mg   Next Week Total 35 mg  35 mg  35 mg  32.5 mg   Sun 5 mg  5 mg  -  5 mg   Mon 5 mg  5 mg  5 mg  5 mg   Tue 5 mg  5 mg  5 mg  5 mg   Wed 5 mg  5 mg  5 mg  5 mg   Thu 5 mg  5 mg  5 mg  5 mg   Fri 5 mg  5 mg  -  2.5 mg (11/10)   Sat 5 mg  5 mg  -  5 mg   Historical INR  3.20  C     3.60      3.90        Visit Report Report            C Corrected result    This result is from an external source.       Plan:  1. INR is Supratherapeutic today- see above in Anticoagulation Summary.   Will instruct LAURA Sequeira to Change their warfarin regimen- see above in Anticoagulation Summary.  partial to 2.5 mg today, then resume, rck 1 week   2. Follow up in 1 weeks  3. They have been instructed to call if any changes in medications, doses, concerns, etc. Patient expresses understanding and has no further questions at this time.    Michelle Piña [facility]

## 2023-11-17 ENCOUNTER — ANTICOAGULATION VISIT (OUTPATIENT)
Dept: PHARMACY | Facility: HOSPITAL | Age: 88
End: 2023-11-17
Payer: MEDICARE

## 2023-11-17 DIAGNOSIS — I63.411 CEREBROVASCULAR ACCIDENT (CVA) DUE TO EMBOLISM OF RIGHT MIDDLE CEREBRAL ARTERY: ICD-10-CM

## 2023-11-17 DIAGNOSIS — Z95.4 HISTORY OF AORTIC VALVE REPLACEMENT WITH METALLIC VALVE: Primary | ICD-10-CM

## 2023-11-17 LAB — INR PPP: 3.3

## 2023-11-17 PROCEDURE — G0249 PROVIDE INR TEST MATER/EQUIP: HCPCS

## 2023-11-17 NOTE — PROGRESS NOTES
Anticoagulation Clinic Progress Note    Anticoagulation Summary  As of 11/17/2023      INR goal:  3.0-3.5   TTR:  68.0% (4.8 y)   INR used for dosing:  3.30 (11/17/2023)   Warfarin maintenance plan:  5 mg every day   Weekly warfarin total:  35 mg   No change documented:  Carmen El PharmD   Plan last modified:  Carmen El PharmD (2/24/2023)   Next INR check:  11/24/2023   Priority:  High   Target end date:  Indefinite    Indications    History of aortic valve replacement with metallic valve [Z95.4]  Atrial fibrillation [I48.91]  Cerebrovascular accident (CVA) due to 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]                 Anticoagulation Episode Summary       INR check location:      Preferred lab:      Send INR reminders to:  [facility] TEST PO[facility]    Comments:  **Home testing training 3/3/23** *CALL EVERY TIME* New INR goal 3 - 3.5 (see 1/2020 hospitalization for CVA)          Anticoagulation Care Providers       Provider Role Specialty Phone number    Griffin Fried MD Referring Cardiology [phone]            Clinic Interview:  No pertinent clinical findings have been reported.    INR History:      10/27/2023     8:18 AM 11/6/2023    12:00 AM 11/6/2023     8:33 AM 11/10/2023    12:00 AM 11/10/2023    11:22 AM 11/17/2023    12:00 AM 11/17/2023     8:15 AM   Anticoagulation Monitoring   INR 3.20  3.60  3.90  3.30   INR Date 10/27/2023  11/6/2023  11/10/2023  11/17/2023   INR Goal 3.0-3.5  3.0-3.5  3.0-3.5  3.0-3.5   Trend Same  Same  Same  Same   Last Week Total 35 mg  35 mg  35 mg  32.5 mg   Next Week Total 35 mg  35 mg  32.5 mg  35 mg   Sun 5 mg  -  5 mg  5 mg   Mon 5 mg  5 mg  5 mg  5 mg   Tue 5 mg  5 mg  5 mg  5 mg   Wed 5 mg  5 mg  5 mg  5 mg   Thu 5 mg  5 mg  5 mg  5 mg   Fri 5 mg  -  2.5 mg (11/10)  5 mg   Sat 5 mg  -  5 mg  5 mg   Historical INR  3.60      3.90      3.30            This result is from an external source.       Plan:  1. INR is therapeutic today- see  above in Anticoagulation Summary.    LAURA Sequeira to continue their warfarin regimen- see above in Anticoagulation Summary.  2. Follow up in 1 week  3. S/W Gladys, patient's spouse.  They have been instructed to call if any changes in medications, doses, concerns, etc. Patient expresses understanding and has no further questions at this time.    Carmen El, PharmD

## 2023-11-24 LAB — INR PPP: 3.6

## 2023-11-27 ENCOUNTER — ANTICOAGULATION VISIT (OUTPATIENT)
Dept: PHARMACY | Facility: HOSPITAL | Age: 88
End: 2023-11-27
Payer: COMMERCIAL

## 2023-11-27 DIAGNOSIS — Z95.4 HISTORY OF AORTIC VALVE REPLACEMENT WITH METALLIC VALVE: Primary | ICD-10-CM

## 2023-11-27 DIAGNOSIS — I63.411 CEREBROVASCULAR ACCIDENT (CVA) DUE TO EMBOLISM OF RIGHT MIDDLE CEREBRAL ARTERY: ICD-10-CM

## 2023-11-27 NOTE — PROGRESS NOTES
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]                 Anticoagulation Episode Summary       INR check location:      Preferred lab:      Send INR reminders to:  [facility]    Comments:  **Home testing training 3/3/23** *CALL EVERY TIME* New INR goal 3 - 3.5 (see 1/2020 hospitalization for CVA)          Anticoagulation Care Providers       Provider Role Specialty Phone number    Griffin Fried MD Referring Cardiology [phone]            Clinic Interview:  Patient Findings     Negatives:  Signs/symptoms of thrombosis, Signs/symptoms of bleeding,   Laboratory test error suspected, Change in health, Change in alcohol use,   Change in activity, Upcoming invasive procedure, Emergency department   visit, Upcoming dental procedure, Missed doses, Extra doses, Change in   medications, Change in diet/appetite, Hospital admission, Bruising, Other   complaints      Clinical Outcomes     Negatives:  Major bleeding event, Thromboembolic event,   Anticoagulation-related hospital admission, Anticoagulation-related ED   visit, Anticoagulation-related fatality        INR History:      11/6/2023     8:33 AM 11/10/2023    12:00 AM 11/10/2023    11:22 AM 11/17/2023    12:00 AM 11/17/2023     8:15 AM 11/24/2023    12:00 AM 11/27/2023     9:53 AM   Anticoagulation Monitoring   INR 3.60  3.90  3.30  3.60   INR Date 11/6/2023   11/10/2023  11/17/2023  11/24/2023   INR Goal 3.0-3.5  3.0-3.5  3.0-3.5  3.0-3.5   Trend Same  Same  Same  Same   Last Week Total 35 mg  35 mg  32.5 mg  35 mg   Next Week Total 35 mg  32.5 mg  35 mg  35 mg   Sun -  5 mg  5 mg  -   Mon 5 mg  5 mg  5 mg  5 mg   Tue 5 mg  5 mg  5 mg  5 mg   Wed 5 mg  5 mg  5 mg  5 mg   Thu 5 mg  5 mg  5 mg  5 mg   Fri -  2.5 mg (11/10)  5 mg  -   Sat -  5 mg  5 mg  -   Historical INR  3.90      3.30      3.60            This result is from an external source.       Plan:  1. INR was Supratherapeutic 11/24/23 - see above in Anticoagulation Summary.   Will instruct LAURA Sequeira to Continue their warfarin regimen- see above in Anticoagulation Summary.  2. Follow up in 1 week.  3. They have been instructed to call if any changes in medications, doses, concerns, etc. Patient expresses understanding and has no further questions at this time.    Vasquez Nñio, PharmD

## 2023-12-01 ENCOUNTER — ANTICOAGULATION VISIT (OUTPATIENT)
Dept: PHARMACY | Facility: HOSPITAL | Age: 88
End: 2023-12-01
Payer: COMMERCIAL

## 2023-12-01 DIAGNOSIS — I63.411 CEREBROVASCULAR ACCIDENT (CVA) DUE TO EMBOLISM OF RIGHT MIDDLE CEREBRAL ARTERY: ICD-10-CM

## 2023-12-01 DIAGNOSIS — Z95.4 HISTORY OF AORTIC VALVE REPLACEMENT WITH METALLIC VALVE: Primary | ICD-10-CM

## 2023-12-01 LAB — INR PPP: 3.4

## 2023-12-01 NOTE — PROGRESS NOTES
Anticoagulation Clinic Progress Note    Anticoagulation Summary  As of 12/1/2023      INR goal:  3.0-3.5   TTR:  67.9% (4.9 y)   INR used for dosing:  3.40 (12/1/2023)   Warfarin maintenance plan:  5 mg every day   Weekly warfarin total:  35 mg   No change documented:  Carmen El PharmD   Plan last modified:  Carmen El PharmD (2/24/2023)   Next INR check:  12/8/2023   Priority:  High   Target end date:  Indefinite    Indications    History of aortic valve replacement with metallic valve [Z95.4]  Atrial fibrillation [I48.91]  Cerebrovascular accident (CVA) due to 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    INR History:      11/10/2023    11:22 AM 11/17/2023    12:00 AM 11/17/2023     8:15 AM 11/24/2023    12:00 AM 11/27/2023     9:53 AM 12/1/2023    12:00 AM 12/1/2023     8:54 AM   Anticoagulation Monitoring   INR 3.90  3.30  3.60  3.40   INR Date 11/10/2023  11/17/2023  11/24/2023  12/1/2023   INR Goal 3.0-3.5  3.0-3.5  3.0-3.5  3.0-3.5   Trend Same  Same  Same  Same   Last Week Total 35 mg  32.5 mg  35 mg  35 mg   Next Week Total 32.5 mg  35 mg  35 mg  35 mg   Sun 5 mg  5 mg  -  5 mg   Mon 5 mg  5 mg  5 mg  5 mg   Tue 5 mg  5 mg  5 mg  5 mg   Wed 5 mg  5 mg  5 mg  5 mg   Thu 5 mg  5 mg  5 mg  5 mg   Fri 2.5 mg (11/10)  5 mg  -  5 mg   Sat 5 mg  5 mg  -  5 mg   Historical INR  3.30      3.60      3.40            This result is from an external source.       Plan:  1. INR is therapeutic today- see above  in Anticoagulation Summary.    LAURA Sequeira to continue their warfarin regimen- see above in Anticoagulation Summary.  2. Follow up in 1 week  3. S/w Gladys, patient's spouse. They have been instructed to call if any changes in medications, doses, concerns, etc. Patient expresses understanding and has no further questions at this time.    Carmen El, PharmD

## 2023-12-08 ENCOUNTER — ANTICOAGULATION VISIT (OUTPATIENT)
Dept: PHARMACY | Facility: HOSPITAL | Age: 88
End: 2023-12-08
Payer: COMMERCIAL

## 2023-12-08 DIAGNOSIS — Z95.4 HISTORY OF AORTIC VALVE REPLACEMENT WITH METALLIC VALVE: Primary | ICD-10-CM

## 2023-12-08 DIAGNOSIS — I63.411 CEREBROVASCULAR ACCIDENT (CVA) DUE TO EMBOLISM OF RIGHT MIDDLE CEREBRAL ARTERY: ICD-10-CM

## 2023-12-08 LAB — INR PPP: 3.9

## 2023-12-08 NOTE — PROGRESS NOTES
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]                 Anticoagulation Episode Summary       INR check location:      Preferred lab:      Send INR reminders to:  [facility]    Comments:  **Home testing training 3/3/23** *CALL EVERY TIME* New INR goal 3 - 3.5 (see 1/2020 hospitalization for CVA)          Anticoagulation Care Providers       Provider Role Specialty Phone number    Griffin Fried MD Referring Cardiology [phone]            Clinic Interview:  Patient Findings     Negatives:  Signs/symptoms of thrombosis, Signs/symptoms of bleeding,   Laboratory test error suspected, Change in health, Change in alcohol use,   Change in activity, Upcoming invasive procedure, Emergency department   visit, Upcoming dental procedure, Missed doses, Extra doses, Change in   medications, Change in diet/appetite, Hospital admission, Bruising, Other   complaints      Clinical Outcomes     Negatives:  Major bleeding event, Thromboembolic event,   Anticoagulation-related hospital admission, Anticoagulation-related ED   visit, Anticoagulation-related fatality        INR History:      11/17/2023     8:15 AM 11/24/2023    12:00 AM 11/27/2023     9:53 AM 12/1/2023    12:00 AM 12/1/2023     8:54 AM 12/8/2023    12:00 AM 12/8/2023    10:16 AM   Anticoagulation Monitoring   INR 3.30  3.60  3.40  3.90   INR Date 11/17/2023 11/24/2023 12/1/2023 12/8/2023   INR Goal 3.0-3.5   3.0-3.5  3.0-3.5  3.0-3.5   Trend Same  Same  Same  Same   Last Week Total 32.5 mg  35 mg  35 mg  35 mg   Next Week Total 35 mg  35 mg  35 mg  32.5 mg   Sun 5 mg  -  5 mg  5 mg   Mon 5 mg  5 mg  5 mg  5 mg   Tue 5 mg  5 mg  5 mg  5 mg   Wed 5 mg  5 mg  5 mg  5 mg   Thu 5 mg  5 mg  5 mg  5 mg   Fri 5 mg  -  5 mg  2.5 mg (12/8)   Sat 5 mg  -  5 mg  5 mg   Historical INR  3.60      3.40      3.90            This result is from an external source.       Plan:  1. INR is Supratherapeutic today- see above in Anticoagulation Summary.   Will instruct LAURA Sequeira to Change their warfarin regimen- see above in Anticoagulation Summary.  2. Follow up in 1 weeks  3. They have been instructed to call if any changes in medications, doses, concerns, etc. Patient expresses understanding and has no further questions at this time.    Carmen El, PharmD

## 2023-12-15 ENCOUNTER — ANTICOAGULATION VISIT (OUTPATIENT)
Dept: PHARMACY | Facility: HOSPITAL | Age: 88
End: 2023-12-15
Payer: COMMERCIAL

## 2023-12-15 DIAGNOSIS — I63.411 CEREBROVASCULAR ACCIDENT (CVA) DUE TO EMBOLISM OF RIGHT MIDDLE CEREBRAL ARTERY: ICD-10-CM

## 2023-12-15 DIAGNOSIS — I48.20 CHRONIC ATRIAL FIBRILLATION: ICD-10-CM

## 2023-12-15 DIAGNOSIS — Z95.4 HISTORY OF AORTIC VALVE REPLACEMENT WITH METALLIC VALVE: Primary | ICD-10-CM

## 2023-12-15 LAB — INR PPP: 3.6

## 2023-12-15 PROCEDURE — G0249 PROVIDE INR TEST MATER/EQUIP: HCPCS

## 2023-12-15 RX ORDER — DIGOXIN 125 MCG
62.5 TABLET ORAL DAILY
Qty: 45 TABLET | Refills: 1 | Status: SHIPPED | OUTPATIENT
Start: 2023-12-15

## 2023-12-15 NOTE — PROGRESS NOTES
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]                 Anticoagulation Episode Summary       INR check location:      Preferred lab:      Send INR reminders to:  [facility]    Comments:  **Home testing training 3/3/23** *CALL EVERY TIME* New INR goal 3 - 3.5 (see 1/2020 hospitalization for CVA)          Anticoagulation Care Providers       Provider Role Specialty Phone number    Griffin Fried MD Referring Cardiology [phone]            Clinic Interview:  Patient Findings     Negatives:  Signs/symptoms of thrombosis, Signs/symptoms of bleeding,   Laboratory test error suspected, Change in health, Change in alcohol use,   Change in activity, Upcoming invasive procedure, Emergency department   visit, Upcoming dental procedure, Missed doses, Extra doses, Change in   medications, Change in diet/appetite, Hospital admission, Bruising, Other   complaints      Clinical Outcomes     Negatives:  Major bleeding event, Thromboembolic event,   Anticoagulation-related hospital admission, Anticoagulation-related ED   visit, Anticoagulation-related fatality        INR History:      11/27/2023     9:53 AM 12/1/2023    12:00 AM 12/1/2023     8:54 AM 12/8/2023    12:00 AM 12/8/2023    10:16 AM 12/15/2023    12:00 AM 12/15/2023     8:15 AM   Anticoagulation Monitoring   INR 3.60  3.40  3.90  3.60   INR Date 11/24/2023 12/1/2023 12/8/2023   12/15/2023   INR Goal 3.0-3.5  3.0-3.5  3.0-3.5  3.0-3.5   Trend Same  Same  Same  Down   Last Week Total 35 mg  35 mg  35 mg  32.5 mg   Next Week Total 35 mg  35 mg  32.5 mg  32.5 mg   Sun -  5 mg  5 mg  5 mg   Mon 5 mg  5 mg  5 mg  5 mg   Tue 5 mg  5 mg  5 mg  5 mg   Wed 5 mg  5 mg  5 mg  5 mg   Thu 5 mg  5 mg  5 mg  5 mg   Fri -  5 mg  2.5 mg (12/8)  2.5 mg   Sat -  5 mg  5 mg  5 mg   Historical INR  3.40      3.90      3.60            This result is from an external source.       Plan:  1. INR is Supratherapeutic today- see above in Anticoagulation Summary.   Will instruct LAURA Sequeira to Change their warfarin regimen- see above in Anticoagulation Summary.  Continue on 2.5 mg on fri, 5 mg AOD, rck 1 week   2. Follow up in 1 weeks  3. They have been instructed to call if any changes in medications, doses, concerns, etc. Patient expresses understanding and has no further questions at this time.    Michelle Piña [facility]

## 2023-12-22 ENCOUNTER — ANTICOAGULATION VISIT (OUTPATIENT)
Dept: PHARMACY | Facility: HOSPITAL | Age: 88
End: 2023-12-22
Payer: COMMERCIAL

## 2023-12-22 DIAGNOSIS — Z95.4 HISTORY OF AORTIC VALVE REPLACEMENT WITH METALLIC VALVE: Primary | ICD-10-CM

## 2023-12-22 DIAGNOSIS — I63.411 CEREBROVASCULAR ACCIDENT (CVA) DUE TO EMBOLISM OF RIGHT MIDDLE CEREBRAL ARTERY: ICD-10-CM

## 2023-12-22 LAB — INR PPP: 3.2

## 2023-12-22 NOTE — PROGRESS NOTES
Anticoagulation Clinic Progress Note    Anticoagulation Summary  As of 12/22/2023      INR goal:  3.0-3.5   TTR:  67.5% (4.9 y)   INR used for dosing:  3.20 (12/22/2023)   Warfarin maintenance plan:  2.5 mg every Fri; 5 mg all other days   Weekly warfarin total:  32.5 mg   No change documented:  Carmen El, PharmD   Plan last modified:  Michelle Piña RPH (12/15/2023)   Next INR check:  12/29/2023   Priority:  High   Target end date:  Indefinite    Indications    History of aortic valve replacement with metallic valve [Z95.4]  Atrial fibrillation [I48.91]  Cerebrovascular accident (CVA) due to 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    INR History:      12/1/2023     8:54 AM 12/8/2023    12:00 AM 12/8/2023    10:16 AM 12/15/2023    12:00 AM 12/15/2023     8:15 AM 12/22/2023    12:00 AM 12/22/2023     8:18 AM   Anticoagulation Monitoring   INR 3.40  3.90  3.60  3.20   INR Date 12/1/2023  12/8/2023  12/15/2023  12/22/2023   INR Goal 3.0-3.5  3.0-3.5  3.0-3.5  3.0-3.5   Trend Same  Same  Down  Same   Last Week Total 35 mg  35 mg  32.5 mg  32.5 mg   Next Week Total 35 mg  32.5 mg  32.5 mg  32.5 mg   Sun 5 mg  5 mg  5 mg  5 mg   Mon 5 mg  5 mg  5 mg  5 mg   Tue 5 mg  5 mg  5 mg  5 mg   Wed 5 mg  5 mg  5 mg  5 mg   Thu 5 mg  5 mg  5 mg  5 mg   Fri 5 mg  2.5 mg (12/8)  2.5 mg  2.5 mg   Sat 5 mg  5 mg  5 mg  5 mg   Historical INR  3.90      3.60      3.20            This result is from an external source.        Plan:  1. INR is therapeutic today- see above in Anticoagulation Summary.    LAURA Sequeira to continue their warfarin regimen- see above in Anticoagulation Summary.  2. Follow up in 1 week  3. They have been instructed to call if any changes in medications, doses, concerns, etc. Patient expresses understanding and has no further questions at this time.    Carmen El, PharmD

## 2023-12-29 ENCOUNTER — OFFICE VISIT (OUTPATIENT)
Dept: GASTROENTEROLOGY | Facility: CLINIC | Age: 88
End: 2023-12-29
Payer: COMMERCIAL

## 2023-12-29 ENCOUNTER — ANTICOAGULATION VISIT (OUTPATIENT)
Dept: PHARMACY | Facility: HOSPITAL | Age: 88
End: 2023-12-29
Payer: COMMERCIAL

## 2023-12-29 VITALS
OXYGEN SATURATION: 100 % | BODY MASS INDEX: 22.38 KG/M2 | DIASTOLIC BLOOD PRESSURE: 72 MMHG | SYSTOLIC BLOOD PRESSURE: 128 MMHG | HEART RATE: 76 BPM | HEIGHT: 72 IN | TEMPERATURE: 98.4 F

## 2023-12-29 DIAGNOSIS — K55.20 ANGIODYSPLASIA OF COLON WITHOUT HEMORRHAGE: ICD-10-CM

## 2023-12-29 DIAGNOSIS — I63.411 CEREBROVASCULAR ACCIDENT (CVA) DUE TO EMBOLISM OF RIGHT MIDDLE CEREBRAL ARTERY: ICD-10-CM

## 2023-12-29 DIAGNOSIS — Z95.4 HISTORY OF AORTIC VALVE REPLACEMENT WITH METALLIC VALVE: Primary | ICD-10-CM

## 2023-12-29 DIAGNOSIS — I10 ESSENTIAL HYPERTENSION: ICD-10-CM

## 2023-12-29 DIAGNOSIS — Z86.010 HISTORY OF COLON POLYPS: ICD-10-CM

## 2023-12-29 DIAGNOSIS — Z86.19 HISTORY OF CLOSTRIDIUM DIFFICILE INFECTION: ICD-10-CM

## 2023-12-29 DIAGNOSIS — D50.9 IRON DEFICIENCY ANEMIA, UNSPECIFIED IRON DEFICIENCY ANEMIA TYPE: Primary | ICD-10-CM

## 2023-12-29 LAB — INR PPP: 3.9

## 2023-12-29 PROCEDURE — 99213 OFFICE O/P EST LOW 20 MIN: CPT | Performed by: NURSE PRACTITIONER

## 2023-12-29 RX ORDER — METOPROLOL SUCCINATE 25 MG/1
25 TABLET, EXTENDED RELEASE ORAL DAILY
Qty: 90 TABLET | Refills: 1 | Status: SHIPPED | OUTPATIENT
Start: 2023-12-29

## 2023-12-29 NOTE — PATIENT INSTRUCTIONS
Recommend continued monitoring of CBC with primary care provider.    If anemia worsens or you develop any visible blood in the stool or black tarry stools, please notify the office.

## 2023-12-29 NOTE — PROGRESS NOTES
"Chief Complaint   Patient presents with    Anemia         History of Present Illness  Patient is an 88-year-old male who presents today for evaluation. He was referred for iron deficiency anemia.  Most recent CBC showed hemoglobin of 13.0.  Hemoglobin has been as low as 9.9 in February 2023.  Most recent colonoscopy was in 2021.  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  He has a history of colon polyps. EGD in 2021 with biopsies negative for celiac disease.  He also has a history of C. difficile.    Patient presents today for follow-up.  He denies any abdominal pain.  Denies any heartburn, reflux, nausea, or vomiting.  Denies any bowel changes, generally has a bowel movement daily in the morning with no difficulty.  Denies any blood in the stool or melena.    He is taking an iron supplement.     Result Review :       Tissue Pathology Exam (10/28/2021 10:05)    UPPER GI ENDOSCOPY (10/28/2021 09:52)    COLONOSCOPY (10/28/2021 09:55)    COLONOSCOPY (11/09/2021 17:27)    Ferritin (10/20/2023 13:46)    CBC & Differential (10/20/2023 13:46)    Office Visit with Celestine English DO (10/20/2023)    Referral to Gastroenterology for Iron deficiency anemia, unspecified iron deficiency anemia type (10/23/2023)    Vital Signs:   /72   Pulse 76   Temp 98.4 °F (36.9 °C)   Ht 182.9 cm (72\")   SpO2 100%   BMI 22.38 kg/m²     Body mass index is 22.38 kg/m².     Physical Exam      Assessment and Plan    Diagnoses and all orders for this visit:    1. Iron deficiency anemia, unspecified iron deficiency anemia type (Primary)    2. Angiodysplasia of colon without hemorrhage    3. History of colon polyps    4. History of Clostridium difficile infection         Discussion  Patient presents today for evaluation, referred for iron deficiency anemia.  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  He has had no visible blood in the stool or melena.  Most recent CBC with " normal hemoglobin at 13.  Patient would prefer to avoid invasive procedures if possible.    Discussed with patient today that as hemoglobin currently normal and no visible bleeding, feel continued monitoring of CBC is appropriate.  If anemia worsens or he develops any visible bleeding, would then recommend updated endoscopic evaluation to reassess for the presence of AVM which may require APC treatment or any other source of bleeding.  Discussed consideration for updating lab work today, patient has upcoming appointment with his primary care provider and would prefer to have continued monitoring performed there.  He may follow-up with our office for any new or worsening symptoms.          Follow Up   Return if symptoms worsen or fail to improve.    Patient Instructions   Recommend continued monitoring of CBC with primary care provider.    If anemia worsens or you develop any visible blood in the stool or black tarry stools, please notify the office.

## 2023-12-29 NOTE — PROGRESS NOTES
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]                 Anticoagulation Episode Summary       INR check location:      Preferred lab:      Send INR reminders to:  [facility]    Comments:  **Home testing training 3/3/23** *CALL EVERY TIME* New INR goal 3 - 3.5 (see 1/2020 hospitalization for CVA)          Anticoagulation Care Providers       Provider Role Specialty Phone number    Griffin Fried MD Referring Cardiology [phone]            Clinic Interview:  Patient Findings     Positives:  Change in alcohol use    Negatives:  Signs/symptoms of thrombosis, Signs/symptoms of bleeding,   Laboratory test error suspected, Change in health, Change in activity,   Upcoming invasive procedure, Emergency department visit, Upcoming dental   procedure, Missed doses, Extra doses, Change in medications, Change in   diet/appetite, Hospital admission, Bruising, Other complaints    Comments:  Reports a small amount of alcohol on Christmas. Reports likely   to have a small amount of alcohol again on New Year's Chanelle, as it is their   wedding anniversary.       Clinical Outcomes     Negatives:  Major bleeding event, Thromboembolic event,   Anticoagulation-related hospital admission, Anticoagulation-related ED   visit, Anticoagulation-related fatality    Comments:  Reports a small amount of alcohol on Christmas. Reports likely   to have a small amount  of alcohol again on New Year's Chanelle, as it is their   wedding anniversary.         INR History:      12/8/2023    10:16 AM 12/15/2023    12:00 AM 12/15/2023     8:15 AM 12/22/2023    12:00 AM 12/22/2023     8:18 AM 12/29/2023    12:00 AM 12/29/2023     7:50 AM   Anticoagulation Monitoring   INR 3.90  3.60  3.20  3.90   INR Date 12/8/2023  12/15/2023  12/22/2023  12/29/2023   INR Goal 3.0-3.5  3.0-3.5  3.0-3.5  3.0-3.5   Trend Same  Down  Same  Same   Last Week Total 35 mg  32.5 mg  32.5 mg  32.5 mg   Next Week Total 32.5 mg  32.5 mg  32.5 mg  30 mg   Sun 5 mg  5 mg  5 mg  5 mg   Mon 5 mg  5 mg  5 mg  5 mg   Tue 5 mg  5 mg  5 mg  5 mg   Wed 5 mg  5 mg  5 mg  5 mg   Thu 5 mg  5 mg  5 mg  5 mg   Fri 2.5 mg (12/8)  2.5 mg  2.5 mg  2.5 mg   Sat 5 mg  5 mg  5 mg  2.5 mg (12/30)   Historical INR  3.60      3.20      3.90            This result is from an external source.       Plan:  1. INR is Supratherapeutic today- see above in Anticoagulation Summary.   Will instruct LAURA AMIN Hua to Change their warfarin regimen (2.5 mg tomorrow; otherwise continue 2.5 mg Fri, 5 mg all other days) - see above in Anticoagulation Summary.  2. Follow up in 1 week.  3. They have been instructed to call if any changes in medications, doses, concerns, etc. Patient expresses understanding and has no further questions at this time.    Vasquez Niño, PharmD

## 2024-01-05 ENCOUNTER — ANTICOAGULATION VISIT (OUTPATIENT)
Dept: PHARMACY | Facility: HOSPITAL | Age: 89
End: 2024-01-05
Payer: COMMERCIAL

## 2024-01-05 DIAGNOSIS — Z95.4 HISTORY OF AORTIC VALVE REPLACEMENT WITH METALLIC VALVE: Primary | ICD-10-CM

## 2024-01-05 DIAGNOSIS — I63.411 CEREBROVASCULAR ACCIDENT (CVA) DUE TO EMBOLISM OF RIGHT MIDDLE CEREBRAL ARTERY: ICD-10-CM

## 2024-01-05 LAB — INR PPP: 3

## 2024-01-05 NOTE — PROGRESS NOTES
Anticoagulation Clinic Progress Note    Anticoagulation Summary  As of 1/5/2024      INR goal:  3.0-3.5   TTR:  67.3% (5 y)   INR used for dosing:  3.00 (1/5/2024)   Warfarin maintenance plan:  2.5 mg every Fri; 5 mg all other days   Weekly warfarin total:  32.5 mg   No change documented:  Vasquez Niño, PharmD   Plan last modified:  Michelle Piña RPH (12/15/2023)   Next INR check:  1/12/2024   Priority:  High   Target end date:  Indefinite    Indications    History of aortic valve replacement with metallic valve [Z95.4]  Atrial fibrillation [I48.91]  Cerebrovascular accident (CVA) due to 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]                 Anticoagulation Episode Summary       INR check location:      Preferred lab:      Send INR reminders to:  [facility]    Comments:  **Home testing training 3/3/23** *CALL EVERY TIME* New INR goal 3 - 3.5 (see 1/2020 hospitalization for CVA)          Anticoagulation Care Providers       Provider Role Specialty Phone number    Griffin Fried MD Referring Cardiology [phone]            Clinic Interview:  Patient Findings     Positives:  Change in alcohol use, Change in diet/appetite    Negatives:  Signs/symptoms of thrombosis, Signs/symptoms of bleeding,   Laboratory test error suspected, Change in health, Change in activity,   Upcoming invasive procedure, Emergency department visit, Upcoming dental   procedure, Missed doses, Extra doses, Change in medications, Hospital   admission, Bruising, Other complaints    Comments:  Reports more cabbage in diet over over the holiday weekend.   Reports a little more alcohol than usual celebrating New Year's Chanelle.      Clinical Outcomes     Negatives:  Major bleeding event, Thromboembolic event,   Anticoagulation-related hospital admission, Anticoagulation-related ED   visit, Anticoagulation-related fatality    Comments:  Reports more cabbage in diet over over the holiday weekend.   Reports a little  more alcohol than usual celebrating New Year's Chanelle.        INR History:      12/15/2023     8:15 AM 12/22/2023    12:00 AM 12/22/2023     8:18 AM 12/29/2023    12:00 AM 12/29/2023     7:50 AM 1/5/2024    12:00 AM 1/5/2024     8:50 AM   Anticoagulation Monitoring   INR 3.60  3.20  3.90  3.00   INR Date 12/15/2023  12/22/2023  12/29/2023  1/5/2024   INR Goal 3.0-3.5  3.0-3.5  3.0-3.5  3.0-3.5   Trend Down  Same  Same  Same   Last Week Total 32.5 mg  32.5 mg  32.5 mg  30 mg   Next Week Total 32.5 mg  32.5 mg  30 mg  32.5 mg   Sun 5 mg  5 mg  5 mg  5 mg   Mon 5 mg  5 mg  5 mg  5 mg   Tue 5 mg  5 mg  5 mg  5 mg   Wed 5 mg  5 mg  5 mg  5 mg   Thu 5 mg  5 mg  5 mg  5 mg   Fri 2.5 mg  2.5 mg  2.5 mg  2.5 mg   Sat 5 mg  5 mg  2.5 mg (12/30)  5 mg   Historical INR  3.20      3.90      3.00        Visit Report    Report Report         This result is from an external source.       Plan:  1. INR is Therapeutic today- see above in Anticoagulation Summary.   Will instruct LAURA BRENNAN Sequeira to Continue their warfarin regimen- see above in Anticoagulation Summary.  2. Follow up in 1 week.  3. They have been instructed to call if any changes in medications, doses, concerns, etc. Patient expresses understanding and has no further questions at this time.    Vasquez Niño, PharmD

## 2024-01-08 DIAGNOSIS — D50.9 IRON DEFICIENCY ANEMIA, UNSPECIFIED IRON DEFICIENCY ANEMIA TYPE: ICD-10-CM

## 2024-01-08 RX ORDER — FERROUS GLUCONATE 324(38)MG
1 TABLET ORAL EVERY OTHER DAY
Qty: 45 TABLET | Refills: 1 | Status: SHIPPED | OUTPATIENT
Start: 2024-01-08

## 2024-01-12 ENCOUNTER — ANTICOAGULATION VISIT (OUTPATIENT)
Dept: PHARMACY | Facility: HOSPITAL | Age: 89
End: 2024-01-12
Payer: COMMERCIAL

## 2024-01-12 DIAGNOSIS — Z95.4 HISTORY OF AORTIC VALVE REPLACEMENT WITH METALLIC VALVE: Primary | ICD-10-CM

## 2024-01-12 DIAGNOSIS — I63.411 CEREBROVASCULAR ACCIDENT (CVA) DUE TO EMBOLISM OF RIGHT MIDDLE CEREBRAL ARTERY: ICD-10-CM

## 2024-01-12 LAB — INR PPP: 3.3

## 2024-01-12 PROCEDURE — G0249 PROVIDE INR TEST MATER/EQUIP: HCPCS

## 2024-01-12 NOTE — PROGRESS NOTES
Anticoagulation Clinic Progress Note    Anticoagulation Summary  As of 1/12/2024      INR goal:  3.0-3.5   TTR:  67.4% (5 y)   INR used for dosing:  3.30 (1/12/2024)   Warfarin maintenance plan:  2.5 mg every Fri; 5 mg all other days   Weekly warfarin total:  32.5 mg   No change documented:  Michelle Piña RPH   Plan last modified:  Michelle iPña RPH (12/15/2023)   Next INR check:  1/19/2024   Priority:  High   Target end date:  Indefinite    Indications    History of aortic valve replacement with metallic valve [Z95.4]  Atrial fibrillation [I48.91]  Cerebrovascular accident (CVA) due to 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]                 Anticoagulation Episode Summary       INR check location:      Preferred lab:      Send INR reminders to:  [facility]    Comments:  **Home testing training 3/3/23** *CALL EVERY TIME* New INR goal 3 - 3.5 (see 1/2020 hospitalization for CVA)          Anticoagulation Care Providers       Provider Role Specialty Phone number    Griffin Fried MD Referring Cardiology [phone]            Clinic Interview:  No pertinent clinical findings have been reported.    INR History:      12/22/2023     8:18 AM 12/29/2023    12:00 AM 12/29/2023     7:50 AM 1/5/2024    12:00 AM 1/5/2024     8:50 AM 1/12/2024    12:00 AM 1/12/2024     8:30 AM   Anticoagulation Monitoring   INR 3.20  3.90  3.00  3.30   INR Date 12/22/2023 12/29/2023 1/5/2024 1/12/2024   INR Goal 3.0-3.5  3.0-3.5  3.0-3.5  3.0-3.5   Trend Same  Same  Same  Same   Last Week Total 32.5 mg  32.5 mg  30 mg  32.5 mg   Next Week Total 32.5 mg  30 mg  32.5 mg  32.5 mg   Sun 5 mg  5 mg  5 mg  5 mg   Mon 5 mg  5 mg  5 mg  5 mg   Tue 5 mg  5 mg  5 mg  5 mg   Wed 5 mg  5 mg  5 mg  5 mg   Thu 5 mg  5 mg  5 mg  5 mg   Fri 2.5 mg  2.5 mg  2.5 mg  2.5 mg   Sat 5 mg  2.5 mg (12/30)  5 mg  5 mg   Historical INR  3.90      3.00      3.30        Visit Report  Report Report           This result is from  an external source.       Plan:  1. INR is therapeutic today- see above in Anticoagulation Summary.    LAURA Sequeira to continue their warfarin regimen- see above in Anticoagulation Summary.  2. Follow up in 1 week  3.. They have been instructed to call if any changes in medications, doses, concerns, etc. Patient expresses understanding and has no further questions at this time.    Michelle Piña, [facility]

## 2024-01-19 ENCOUNTER — ANTICOAGULATION VISIT (OUTPATIENT)
Dept: PHARMACY | Facility: HOSPITAL | Age: 89
End: 2024-01-19
Payer: COMMERCIAL

## 2024-01-19 DIAGNOSIS — I63.411 CEREBROVASCULAR ACCIDENT (CVA) DUE TO EMBOLISM OF RIGHT MIDDLE CEREBRAL ARTERY: ICD-10-CM

## 2024-01-19 DIAGNOSIS — Z95.4 HISTORY OF AORTIC VALVE REPLACEMENT WITH METALLIC VALVE: Primary | ICD-10-CM

## 2024-01-19 LAB — INR PPP: 3.3

## 2024-01-19 NOTE — PROGRESS NOTES
Anticoagulation Clinic Progress Note    Anticoagulation Summary  As of 1/19/2024      INR goal:  3.0-3.5   TTR:  67.6% (5 y)   INR used for dosing:  3.30 (1/19/2024)   Warfarin maintenance plan:  2.5 mg every Fri; 5 mg all other days   Weekly warfarin total:  32.5 mg   No change documented:  Vasquez Niño, PharmD   Plan last modified:  Michelle Piña RPH (12/15/2023)   Next INR check:  1/26/2024   Priority:  High   Target end date:  Indefinite    Indications    History of aortic valve replacement with metallic valve [Z95.4]  Atrial fibrillation [I48.91]  Cerebrovascular accident (CVA) due to 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]                 Anticoagulation Episode Summary       INR check location:      Preferred lab:      Send INR reminders to:  [facility] TEST [facility]    Comments:  **Home testing training 3/3/23** *CALL EVERY TIME* New INR goal 3 - 3.5 (see 1/2020 hospitalization for CVA)          Anticoagulation Care Providers       Provider Role Specialty Phone number    Griffin Fried MD Referring Cardiology [phone]            Clinic Interview:  Patient Findings     Negatives:  Signs/symptoms of thrombosis, Signs/symptoms of bleeding,   Laboratory test error suspected, Change in health, Change in alcohol use,   Change in activity, Upcoming invasive procedure, Emergency department   visit, Upcoming dental procedure, Missed doses, Extra doses, Change in   medications, Change in diet/appetite, Hospital admission, Bruising, Other   complaints      Clinical Outcomes     Negatives:  Major bleeding event, Thromboembolic event,   Anticoagulation-related hospital admission, Anticoagulation-related ED   visit, Anticoagulation-related fatality        INR History:      12/29/2023     7:50 AM 1/5/2024    12:00 AM 1/5/2024     8:50 AM 1/12/2024    12:00 AM 1/12/2024     8:30 AM 1/19/2024    12:00 AM 1/19/2024     8:26 AM   Anticoagulation Monitoring   INR 3.90  3.00  3.30  3.30   INR Date  12/29/2023 1/5/2024 1/12/2024 1/19/2024   INR Goal 3.0-3.5  3.0-3.5  3.0-3.5  3.0-3.5   Trend Same  Same  Same  Same   Last Week Total 32.5 mg  30 mg  32.5 mg  32.5 mg   Next Week Total 30 mg  32.5 mg  32.5 mg  32.5 mg   Sun 5 mg  5 mg  5 mg  5 mg   Mon 5 mg  5 mg  5 mg  5 mg   Tue 5 mg  5 mg  5 mg  5 mg   Wed 5 mg  5 mg  5 mg  5 mg   Thu 5 mg  5 mg  5 mg  5 mg   Fri 2.5 mg  2.5 mg  2.5 mg  2.5 mg   Sat 2.5 mg (12/30)  5 mg  5 mg  5 mg   Historical INR  3.00      3.30      3.30        Visit Report Report             This result is from an external source.       Plan:  1. INR is Therapeutic today- see above in Anticoagulation Summary.   Will instruct LAURA Sequeira to Continue their warfarin regimen- see above in Anticoagulation Summary.  2. Follow up in 1 week.  3. They have been instructed to call if any changes in medications, doses, concerns, etc. Patient expresses understanding and has no further questions at this time.    Vasquez Niño, PharmD

## 2024-01-26 ENCOUNTER — ANTICOAGULATION VISIT (OUTPATIENT)
Dept: PHARMACY | Facility: HOSPITAL | Age: 89
End: 2024-01-26
Payer: COMMERCIAL

## 2024-01-26 DIAGNOSIS — Z95.4 HISTORY OF AORTIC VALVE REPLACEMENT WITH METALLIC VALVE: Primary | ICD-10-CM

## 2024-01-26 DIAGNOSIS — I63.411 CEREBROVASCULAR ACCIDENT (CVA) DUE TO EMBOLISM OF RIGHT MIDDLE CEREBRAL ARTERY: ICD-10-CM

## 2024-01-26 LAB — INR PPP: 3

## 2024-01-26 NOTE — PROGRESS NOTES
Anticoagulation Clinic Progress Note    Anticoagulation Summary  As of 1/26/2024      INR goal:  3.0-3.5   TTR:  67.7% (5 y)   INR used for dosing:  3.00 (1/26/2024)   Warfarin maintenance plan:  2.5 mg every Fri; 5 mg all other days   Weekly warfarin total:  32.5 mg   No change documented:  Vasquez Niño, PharmD   Plan last modified:  Michelle Piña RPH (12/15/2023)   Next INR check:  2/2/2024   Priority:  High   Target end date:  Indefinite    Indications    History of aortic valve replacement with metallic valve [Z95.4]  Atrial fibrillation [I48.91]  Cerebrovascular accident (CVA) due to 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]                 Anticoagulation Episode Summary       INR check location:      Preferred lab:      Send INR reminders to:  [facility] TEST [facility]    Comments:  **Home testing training 3/3/23** *CALL EVERY TIME* New INR goal 3 - 3.5 (see 1/2020 hospitalization for CVA)          Anticoagulation Care Providers       Provider Role Specialty Phone number    Griffin Fried MD Referring Cardiology [phone]            Clinic Interview:  Patient Findings     Negatives:  Signs/symptoms of thrombosis, Signs/symptoms of bleeding,   Laboratory test error suspected, Change in health, Change in alcohol use,   Change in activity, Upcoming invasive procedure, Emergency department   visit, Upcoming dental procedure, Missed doses, Extra doses, Change in   medications, Change in diet/appetite, Hospital admission, Bruising, Other   complaints      Clinical Outcomes     Negatives:  Major bleeding event, Thromboembolic event,   Anticoagulation-related hospital admission, Anticoagulation-related ED   visit, Anticoagulation-related fatality        INR History:      1/5/2024     8:50 AM 1/12/2024    12:00 AM 1/12/2024     8:30 AM 1/19/2024    12:00 AM 1/19/2024     8:26 AM 1/26/2024    12:00 AM 1/26/2024     9:12 AM   Anticoagulation Monitoring   INR 3.00  3.30  3.30  3.00   INR Date  1/5/2024 1/12/2024 1/19/2024 1/26/2024   INR Goal 3.0-3.5  3.0-3.5  3.0-3.5  3.0-3.5   Trend Same  Same  Same  Same   Last Week Total 30 mg  32.5 mg  32.5 mg  32.5 mg   Next Week Total 32.5 mg  32.5 mg  32.5 mg  32.5 mg   Sun 5 mg  5 mg  5 mg  5 mg   Mon 5 mg  5 mg  5 mg  5 mg   Tue 5 mg  5 mg  5 mg  5 mg   Wed 5 mg  5 mg  5 mg  5 mg   Thu 5 mg  5 mg  5 mg  5 mg   Fri 2.5 mg  2.5 mg  2.5 mg  2.5 mg   Sat 5 mg  5 mg  5 mg  5 mg   Historical INR  3.30      3.30      3.00            This result is from an external source.       Plan:  1. INR is Therapeutic today- see above in Anticoagulation Summary.   Will instruct LAURA Sequeira to Continue their warfarin regimen- see above in Anticoagulation Summary.  2. Follow up in 1 week.  3. They have been instructed to call if any changes in medications, doses, concerns, etc. Patient expresses understanding and has no further questions at this time.    Vasquez Niño, PharmD

## 2024-02-02 ENCOUNTER — ANTICOAGULATION VISIT (OUTPATIENT)
Dept: PHARMACY | Facility: HOSPITAL | Age: 89
End: 2024-02-02
Payer: COMMERCIAL

## 2024-02-02 ENCOUNTER — OFFICE VISIT (OUTPATIENT)
Dept: INTERNAL MEDICINE | Facility: CLINIC | Age: 89
End: 2024-02-02
Payer: COMMERCIAL

## 2024-02-02 VITALS
HEIGHT: 72 IN | SYSTOLIC BLOOD PRESSURE: 160 MMHG | WEIGHT: 167 LBS | BODY MASS INDEX: 22.62 KG/M2 | DIASTOLIC BLOOD PRESSURE: 88 MMHG | HEART RATE: 87 BPM | OXYGEN SATURATION: 100 %

## 2024-02-02 DIAGNOSIS — N18.32 TYPE 2 DIABETES MELLITUS WITH STAGE 3B CHRONIC KIDNEY DISEASE, WITH LONG-TERM CURRENT USE OF INSULIN: ICD-10-CM

## 2024-02-02 DIAGNOSIS — I63.411 CEREBROVASCULAR ACCIDENT (CVA) DUE TO EMBOLISM OF RIGHT MIDDLE CEREBRAL ARTERY: ICD-10-CM

## 2024-02-02 DIAGNOSIS — Z79.4 TYPE 2 DIABETES MELLITUS WITH STAGE 3B CHRONIC KIDNEY DISEASE, WITH LONG-TERM CURRENT USE OF INSULIN: ICD-10-CM

## 2024-02-02 DIAGNOSIS — E11.22 TYPE 2 DIABETES MELLITUS WITH STAGE 3B CHRONIC KIDNEY DISEASE, WITH LONG-TERM CURRENT USE OF INSULIN: ICD-10-CM

## 2024-02-02 DIAGNOSIS — E61.1 IRON DEFICIENCY: Primary | ICD-10-CM

## 2024-02-02 DIAGNOSIS — R80.9 MICROALBUMINURIA: ICD-10-CM

## 2024-02-02 DIAGNOSIS — Z95.4 HISTORY OF AORTIC VALVE REPLACEMENT WITH METALLIC VALVE: Primary | ICD-10-CM

## 2024-02-02 LAB
BASOPHILS # BLD AUTO: 0.04 10*3/MM3 (ref 0–0.2)
BASOPHILS NFR BLD AUTO: 0.4 % (ref 0–1.5)
EOSINOPHIL # BLD AUTO: 0.16 10*3/MM3 (ref 0–0.4)
EOSINOPHIL NFR BLD AUTO: 1.7 % (ref 0.3–6.2)
ERYTHROCYTE [DISTWIDTH] IN BLOOD BY AUTOMATED COUNT: 15.8 % (ref 12.3–15.4)
FERRITIN SERPL-MCNC: 117 NG/ML (ref 30–400)
HBA1C MFR BLD: 8.5 % (ref 4.8–5.6)
HCT VFR BLD AUTO: 42.6 % (ref 37.5–51)
HGB BLD-MCNC: 13.3 G/DL (ref 13–17.7)
IMM GRANULOCYTES # BLD AUTO: 0.03 10*3/MM3 (ref 0–0.05)
IMM GRANULOCYTES NFR BLD AUTO: 0.3 % (ref 0–0.5)
INR PPP: 2.9
IRON SATN MFR SERPL: 15 % (ref 20–50)
IRON SERPL-MCNC: 42 MCG/DL (ref 59–158)
LYMPHOCYTES # BLD AUTO: 1.59 10*3/MM3 (ref 0.7–3.1)
LYMPHOCYTES NFR BLD AUTO: 16.4 % (ref 19.6–45.3)
MCH RBC QN AUTO: 25.2 PG (ref 26.6–33)
MCHC RBC AUTO-ENTMCNC: 31.2 G/DL (ref 31.5–35.7)
MCV RBC AUTO: 80.8 FL (ref 79–97)
MONOCYTES # BLD AUTO: 0.77 10*3/MM3 (ref 0.1–0.9)
MONOCYTES NFR BLD AUTO: 8 % (ref 5–12)
NEUTROPHILS # BLD AUTO: 7.08 10*3/MM3 (ref 1.7–7)
NEUTROPHILS NFR BLD AUTO: 73.2 % (ref 42.7–76)
PLATELET # BLD AUTO: 154 10*3/MM3 (ref 140–450)
RBC # BLD AUTO: 5.27 10*6/MM3 (ref 4.14–5.8)
TIBC SERPL-MCNC: 289 MCG/DL
UIBC SERPL-MCNC: 247 MCG/DL (ref 112–346)
WBC # BLD AUTO: 9.67 10*3/MM3 (ref 3.4–10.8)

## 2024-02-02 PROCEDURE — 99214 OFFICE O/P EST MOD 30 MIN: CPT | Performed by: STUDENT IN AN ORGANIZED HEALTH CARE EDUCATION/TRAINING PROGRAM

## 2024-02-02 NOTE — PROGRESS NOTES
Anticoagulation Clinic Progress Note    Anticoagulation Summary  As of 2024      INR goal:  3.0-3.5   TTR:  67.4% (5 y)   INR used for dosin.90 (2024)   Warfarin maintenance plan:  2.5 mg every Fri; 5 mg all other days   Weekly warfarin total:  32.5 mg   Plan last modified:  Michelle Piña RPH (12/15/2023)   Next INR check:  2024   Priority:  High   Target end date:  Indefinite    Indications    History of aortic valve replacement with metallic valve [Z95.4]  Atrial fibrillation [I48.91]  Cerebrovascular accident (CVA) due to 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]                 Anticoagulation Episode Summary       INR check location:      Preferred lab:      Send INR reminders to:  [facility]    Comments:  **Home testing training 3/3/23** *CALL EVERY TIME* New INR goal 3 - 3.5 (see 2020 hospitalization for CVA)          Anticoagulation Care Providers       Provider Role Specialty Phone number    Griffin Fried MD Referring Cardiology [phone]            Clinic Interview:  Patient Findings     Positives:  Change in medications    Negatives:  Signs/symptoms of thrombosis, Signs/symptoms of bleeding,   Laboratory test error suspected, Change in health, Change in alcohol use,   Change in activity, Upcoming invasive procedure, Emergency department   visit, Upcoming dental procedure, Missed doses, Extra doses, Change in   diet/appetite, Hospital admission, Bruising, Other complaints    Comments:  Reports dapagliflozin was prescribed today; will begin taking   tomorrow.       Clinical Outcomes     Negatives:  Major bleeding event, Thromboembolic event,   Anticoagulation-related hospital admission, Anticoagulation-related ED   visit, Anticoagulation-related fatality    Comments:  Reports dapagliflozin was prescribed today; will begin taking   tomorrow.         INR History:      2024     8:30 AM 2024    12:00 AM 2024     8:26 AM 2024     12:00 AM 1/26/2024     9:12 AM 2/2/2024    12:00 AM 2/2/2024     9:05 AM   Anticoagulation Monitoring   INR 3.30  3.30  3.00  2.90   INR Date 1/12/2024 1/19/2024 1/26/2024 2/2/2024   INR Goal 3.0-3.5  3.0-3.5  3.0-3.5  3.0-3.5   Trend Same  Same  Same  Same   Last Week Total 32.5 mg  32.5 mg  32.5 mg  32.5 mg   Next Week Total 32.5 mg  32.5 mg  32.5 mg  35 mg   Sun 5 mg  5 mg  5 mg  5 mg   Mon 5 mg  5 mg  5 mg  5 mg   Tue 5 mg  5 mg  5 mg  5 mg   Wed 5 mg  5 mg  5 mg  5 mg   Thu 5 mg  5 mg  5 mg  5 mg   Fri 2.5 mg  2.5 mg  2.5 mg  5 mg (2/2)   Sat 5 mg  5 mg  5 mg  5 mg   Historical INR  3.30      3.00      2.90        Visit Report      Report Report       This result is from an external source.       Plan:  1. INR is Subtherapeutic today- see above in Anticoagulation Summary.   Will instruct LAURA BRENNAN Sequeira to Change their warfarin regimen (5 mg today, then resume 2.5 mg Fri, 5 mg all other days) - see above in Anticoagulation Summary.  2. Follow up in 1 week.  3. They have been instructed to call if any changes in medications, doses, concerns, etc. Patient expresses understanding and has no further questions at this time.    Vasquez Niño, PharmD

## 2024-02-02 NOTE — PROGRESS NOTES
"  Celestine English D.O.  Internal Medicine  [facility]  [address], [address]  [place] [address]  [phone]      Chief Complaint  Diabetes    SUBJECTIVE    History of Present Illness    LAURA Sequeira is a 88 y.o. male who presents to the office today as an established patient that last saw me on 10/20/2023.    Here today with his wife who helps provide history    Iron deficiency anemia, GI Bleed: required hospitalization twice , first in 10/2021 and again in 11/2021. Previously took ferrous gluconate every other day. Has followed with GI since last visit and they are watching his iron deficiency and hemoglobin as opposed to putting him through another procedure. He is taking ferrous gluconate every other day.      Type 2 diabetes/CKD 3b and microalbuminuria: still using VGO 40 , 0-4 4 clicks per day on average. They do not currently follow any specific plan for how many clicks he takes or when, just based upon if his sugar seems \"too high\". Fasting sugar averages  depending on what he had for dinner last night.  He uses a freestyle vance. He has a Gvoke PypoPen. No new symptoms of low blood sugar. Follows with Dr Dominguez with nephrology     Allergies   Allergen Reactions    Penicillins Swelling    Oxycodone-Acetaminophen Nausea And Vomiting    Other Other (See Comments)     Grass, ragweed    Percocet [Oxycodone-Acetaminophen] Nausea And Vomiting        Outpatient Medications Marked as Taking for the 2/2/24 encounter (Office Visit) with Celestine English, DO   Medication Sig Dispense Refill    atorvastatin (LIPITOR) 40 MG tablet Take 1 tablet by mouth Daily. 90 tablet 1    Continuous Blood Gluc Sensor (FreeStyle Vance 14 Day Sensor) [facility] USE ONE EVERY 14 DAYS 6 each 3    digoxin (LANOXIN) 125 MCG tablet TAKE 1/2 TABLET BY MOUTH DAILY 45 tablet 1    diphenhydrAMINE (BENADRYL) 25 mg capsule Take 1 capsule by mouth 2 (Two) Times a Day As Needed for Itching, Allergies or Sleep.      " famotidine (PEPCID) 20 MG tablet Take 1 tablet by mouth Daily. 90 tablet 1    ferrous gluconate (FERGON) 324 MG tablet TAKE 1 TABLET BY MOUTH EVERY OTHER DAY 45 tablet 1    Glucagon (Gvoke HypoPen 1-Pack) 1 MG/0.2ML solution auto-injector Inject 1 mg under the skin into the appropriate area as directed 1 (One) Time As Needed (for symptoms of low blood sugar. Report to ER immediately after use.) for up to 1 dose. 0.2 mL 1    guaiFENesin (MUCINEX) 600 MG 12 hr tablet Take 1 tablet by mouth.      Insulin Disposable Pump (V-Go 40) 40 UNIT/24HR kit 1 each Daily. 30 kit 5    Insulin Lispro (HumaLOG) 100 UNIT/ML injection Use in V-GO 40 system to administer 40 to 100 units of insulin daily. 30 mL 5    magnesium oxide (MAG-OX) 400 MG tablet Take 1 tablet by mouth 2 (Two) Times a Day.      metoprolol succinate XL (TOPROL-XL) 25 MG 24 hr tablet TAKE 1 TABLET BY MOUTH DAILY 90 tablet 1    vitamin D (ERGOCALCIFEROL) 1.25 MG (16722 UT) capsule capsule Take 1 capsule by mouth 1 (One) Time Per Week.      warfarin (COUMADIN) 5 MG tablet TAKE ONE TABLET BY MOUTH DAILY AS DIRECTED 90 tablet 1        Past Medical History:   Diagnosis Date    Allergic rhinitis     Aortic valve insufficiency     Ascending aortic aneurysm     Aspiration pneumonia     Atrial fibrillation     Bacteremia     Calcific aortic stenosis of bicuspid valve     Cardiac arrest     Cataracts, bilateral     CKD (chronic kidney disease) stage 3, GFR 30-59 ml/min     Contact dermatitis due to poison ivy     Elbow fracture     GERD (gastroesophageal reflux disease)     GI bleed     2021; s/p Colonoscopy and EGD    H/O mechanical aortic valve replacement     Head injury     History of transfusion     Hyperlipidemia     Hypertension     Kidney carcinoma     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   Constitutional:       Appearance: He is normal weight. He is ill-appearing (chronically).   HENT:      Head: Atraumatic.   Eyes:      General: No scleral icterus.  Cardiovascular:      Rate and Rhythm: Normal rate. Rhythm irregular.      Heart sounds: Normal heart sounds.      Systolic murmur is present with a grade of 2/6.      Comments: Mechanical valve click present  Pulmonary:      Effort: Pulmonary effort is normal. No respiratory distress.      Breath sounds: No wheezing.      Comments: Decreased breath sounds right lung base (chronic)  Musculoskeletal:      Right lower leg: No edema.      Left lower leg: No edema.      Comments: Ambulating with walker   Skin:     Coloration: Skin is not jaundiced.   Neurological:      Mental Status: He is alert.   Psychiatric:         Mood and Affect: Mood normal.         Behavior: Behavior normal.         Thought Content: Thought content normal.                             ASSESSMENT & PLAN     Diagnoses and all orders for this visit:    1. Iron deficiency (Primary)  Lab Results   Component Value Date    WBC 6.47 10/20/2023    HGB 13.0 10/20/2023    HCT 41.4 10/20/2023    MCV 81.5 10/20/2023     10/20/2023       -required hospitalization twice , first in 10/2021 and again in 11/2021. Previously took ferrous gluconate every other day. Has followed with GI since last visit and they are watching his iron deficiency and hemoglobin as opposed to putting him through another procedure. He is taking ferrous gluconate every other day.   -since last visit he saw GI for discussion about repeated iron deficiency without anemia and I reviewed that progress note. Plan is to monitor Hgb and iron studies and only scope him again if anemia or overt bleeding " "results.  -repeat CBC and iron studies today  -     CBC w AUTO Differential  -     Iron Profile  -     Ferritin    2. Type 2 diabetes mellitus with stage 3b chronic kidney disease, with long-term current use of insulin  3. Microalbuminuria  -A1c trends on file for this patient:   A1C Last 3 Results          3/10/2023    10:32 6/16/2023    09:41 10/20/2023    13:46   HGBA1C Last 3 Results   Hemoglobin A1C 7.30  7.60  7.80      -Goal A1c for this patient is less than 7.5 given age, risk of hypoglycemia, and goal set by his nephrologist   -Current diabetes regimen:still using VGO 40 , 0-4 4 clicks per day on average. They do not currently follow any specific plan for how many clicks he takes or when, just based upon if his sugar seems \"too high\". Fasting sugar averages  depending on what he had for dinner last night.  He uses a freestyle lisa. He has a Gvoke PypoPen. No new symptoms of low blood sugar. Follows with Dr Dominguez with nephrology  -Changes made to diabetes regimen today: recheck A1c. He has been trending up. Even though he has been on VGO for years at the direction of previous PCP, I am concerned about risk of hypoglycemia given his age, memory loss and frailty. I discussed that we have a medication such as dapagluflozin that can reduce blood sugar when needed and also reduce risk of CKD progression. This could be of dual benefit to him. Advised him and his wife that most patients have no side effects but side effects that occur less than 10% of the time include dehydration, nausea , urinary tract infection that can be severe so he should report any pain with urination, for example. He should also increase fluid intake if he feels dry. After hearing risks and benefits he was agreeable to treatment.   **I recommended that he take no clicks from the VGO system unless his blood sugar 3 hours after eating a meal is above 250. If that is the case, he can take ONE click. This is an attempt to overall " decrease the number of clicks of extra insulin he gets and to also make his regimen more standardized.   -Diabetic kidney disease screening: up to date and positive          The following social determinates of health impact the patient's medical decision making: No social determinates of health were factored in to today's visit.     Follow Up  Return in about 3 months (around 5/2/2024) for Recheck.    Patient/family had no further questions at this time and verbalized understanding of the plan discussed today.

## 2024-02-09 ENCOUNTER — ANTICOAGULATION VISIT (OUTPATIENT)
Dept: PHARMACY | Facility: HOSPITAL | Age: 89
End: 2024-02-09
Payer: COMMERCIAL

## 2024-02-09 DIAGNOSIS — Z95.4 HISTORY OF AORTIC VALVE REPLACEMENT WITH METALLIC VALVE: Primary | ICD-10-CM

## 2024-02-09 DIAGNOSIS — I63.411 CEREBROVASCULAR ACCIDENT (CVA) DUE TO EMBOLISM OF RIGHT MIDDLE CEREBRAL ARTERY: ICD-10-CM

## 2024-02-09 LAB — INR PPP: 3

## 2024-02-09 PROCEDURE — G0249 PROVIDE INR TEST MATER/EQUIP: HCPCS

## 2024-02-09 NOTE — PROGRESS NOTES
Anticoagulation Clinic Progress Note    Anticoagulation Summary  As of 2/9/2024      INR goal:  3.0-3.5   TTR:  67.2% (5.1 y)   INR used for dosing:  3.00 (2/9/2024)   Warfarin maintenance plan:  2.5 mg every Fri; 5 mg all other days   Weekly warfarin total:  32.5 mg   No change documented:  Carmen El, PharmD   Plan last modified:  Michelle Piña RPH (12/15/2023)   Next INR check:  2/16/2024   Priority:  High   Target end date:  Indefinite    Indications    History of aortic valve replacement with metallic valve [Z95.4]  Atrial fibrillation [I48.91]  Cerebrovascular accident (CVA) due to 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]                 Anticoagulation Episode Summary       INR check location:      Preferred lab:      Send INR reminders to:  [facility] TEST POOL    Comments:  **Home testing training 3/3/23** *CALL EVERY TIME* New INR goal 3 - 3.5 (see 1/2020 hospitalization for CVA)          Anticoagulation Care Providers       Provider Role Specialty Phone number    Griffin Fried MD Referring Cardiology [phone]            Clinic Interview:  Patient Findings     Negatives:  Signs/symptoms of thrombosis, Signs/symptoms of bleeding,   Laboratory test error suspected, Change in health, Change in alcohol use,   Change in activity, Upcoming invasive procedure, Emergency department   visit, Upcoming dental procedure, Missed doses, Extra doses, Change in   medications, Change in diet/appetite, Hospital admission, Bruising, Other   complaints      Clinical Outcomes     Negatives:  Major bleeding event, Thromboembolic event,   Anticoagulation-related hospital admission, Anticoagulation-related ED   visit, Anticoagulation-related fatality        INR History:      1/19/2024     8:26 AM 1/26/2024    12:00 AM 1/26/2024     9:12 AM 2/2/2024    12:00 AM 2/2/2024     9:05 AM 2/9/2024    12:00 AM 2/9/2024     8:45 AM   Anticoagulation Monitoring   INR 3.30  3.00  2.90  3.00   INR Date  1/19/2024 1/26/2024 2/2/2024 2/9/2024   INR Goal 3.0-3.5  3.0-3.5  3.0-3.5  3.0-3.5   Trend Same  Same  Same  Same   Last Week Total 32.5 mg  32.5 mg  32.5 mg  35 mg   Next Week Total 32.5 mg  32.5 mg  35 mg  32.5 mg   Sun 5 mg  5 mg  5 mg  5 mg   Mon 5 mg  5 mg  5 mg  5 mg   Tue 5 mg  5 mg  5 mg  5 mg   Wed 5 mg  5 mg  5 mg  5 mg   Thu 5 mg  5 mg  5 mg  5 mg   Fri 2.5 mg  2.5 mg  5 mg (2/2)  2.5 mg   Sat 5 mg  5 mg  5 mg  5 mg   Historical INR  3.00      2.90      3.00        Visit Report    Report Report         This result is from an external source.       Plan:  1. INR is Therapeutic today- see above in Anticoagulation Summary.   Will instruct LAURA Sequeira to Continue their warfarin regimen- see above in Anticoagulation Summary.  2. Follow up in 1 weeks  3. They have been instructed to call if any changes in medications, doses, concerns, etc. Patient expresses understanding and has no further questions at this time.    Carmen El, PharmD

## 2024-02-12 RX ORDER — FAMOTIDINE 20 MG/1
20 TABLET, FILM COATED ORAL DAILY
Qty: 90 TABLET | Refills: 1 | Status: SHIPPED | OUTPATIENT
Start: 2024-02-12

## 2024-02-16 ENCOUNTER — ANTICOAGULATION VISIT (OUTPATIENT)
Dept: PHARMACY | Facility: HOSPITAL | Age: 89
End: 2024-02-16
Payer: COMMERCIAL

## 2024-02-16 DIAGNOSIS — Z95.4 HISTORY OF AORTIC VALVE REPLACEMENT WITH METALLIC VALVE: Primary | ICD-10-CM

## 2024-02-16 DIAGNOSIS — I63.411 CEREBROVASCULAR ACCIDENT (CVA) DUE TO EMBOLISM OF RIGHT MIDDLE CEREBRAL ARTERY: ICD-10-CM

## 2024-02-16 LAB — INR PPP: 3.4

## 2024-02-23 ENCOUNTER — ANTICOAGULATION VISIT (OUTPATIENT)
Dept: PHARMACY | Facility: HOSPITAL | Age: 89
End: 2024-02-23
Payer: COMMERCIAL

## 2024-02-23 DIAGNOSIS — Z95.4 HISTORY OF AORTIC VALVE REPLACEMENT WITH METALLIC VALVE: Primary | ICD-10-CM

## 2024-02-23 DIAGNOSIS — I63.411 CEREBROVASCULAR ACCIDENT (CVA) DUE TO EMBOLISM OF RIGHT MIDDLE CEREBRAL ARTERY: ICD-10-CM

## 2024-02-23 LAB — INR PPP: 3.5

## 2024-02-23 NOTE — PROGRESS NOTES
Anticoagulation Clinic Progress Note    Anticoagulation Summary  As of 2/23/2024      INR goal:  3.0-3.5   TTR:  67.4% (5.1 y)   INR used for dosing:  3.50 (2/23/2024)   Warfarin maintenance plan:  2.5 mg every Fri; 5 mg all other days   Weekly warfarin total:  32.5 mg   No change documented:  Emerald Welch RPH   Plan last modified:  Michelle Piña RPH (12/15/2023)   Next INR check:  3/1/2024   Priority:  High   Target end date:  Indefinite    Indications    History of aortic valve replacement with metallic valve [Z95.4]  Atrial fibrillation [I48.91]  Cerebrovascular accident (CVA) due to 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]                 Anticoagulation Episode Summary       INR check location:      Preferred lab:      Send INR reminders to:  [facility]    Comments:  **Home testing training 3/3/23** *CALL EVERY TIME* New INR goal 3 - 3.5 (see 1/2020 hospitalization for CVA)          Anticoagulation Care Providers       Provider Role Specialty Phone number    Griffin Fried MD Referring Cardiology [phone]            Clinic Interview:  Patient Findings     Negatives:  Signs/symptoms of thrombosis, Signs/symptoms of bleeding,   Laboratory test error suspected, Change in health, Change in alcohol use,   Change in activity, Upcoming invasive procedure, Emergency department   visit, Upcoming dental procedure, Missed doses, Extra doses, Change in   medications, Change in diet/appetite, Hospital admission, Bruising, Other   complaints      Clinical Outcomes     Negatives:  Major bleeding event, Thromboembolic event,   Anticoagulation-related hospital admission, Anticoagulation-related ED   visit, Anticoagulation-related fatality        INR History:      2/2/2024     9:05 AM 2/9/2024    12:00 AM 2/9/2024     8:45 AM 2/16/2024    12:00 AM 2/16/2024     8:04 AM 2/23/2024    12:00 AM 2/23/2024     8:53 AM   Anticoagulation Monitoring   INR 2.90  3.00  3.40  3.50   INR Date  2/2/2024 2/9/2024 2/16/2024 2/23/2024   INR Goal 3.0-3.5  3.0-3.5  3.0-3.5  3.0-3.5   Trend Same  Same  Same  Same   Last Week Total 32.5 mg  35 mg  32.5 mg  32.5 mg   Next Week Total 35 mg  32.5 mg  32.5 mg  32.5 mg   Sun 5 mg  5 mg  5 mg  5 mg   Mon 5 mg  5 mg  5 mg  5 mg   Tue 5 mg  5 mg  5 mg  5 mg   Wed 5 mg  5 mg  5 mg  5 mg   Thu 5 mg  5 mg  5 mg  5 mg   Fri 5 mg (2/2)  2.5 mg  2.5 mg  2.5 mg   Sat 5 mg  5 mg  5 mg  5 mg   Historical INR  3.00      3.40      3.50        Visit Report Report             This result is from an external source.       Plan:  1. INR is Therapeutic today- see above in Anticoagulation Summary.   Will instruct LAURA BRENNAN Sequeira to Continue their warfarin regimen- see above in Anticoagulation Summary.  2. Follow up in 1 week  3. They have been instructed to call if any changes in medications, doses, concerns, etc. Patient expresses understanding and has no further questions at this time.    Emerald Welch [facility]

## 2024-02-26 RX ORDER — WARFARIN SODIUM 5 MG/1
TABLET ORAL
Qty: 90 TABLET | Refills: 1 | Status: SHIPPED | OUTPATIENT
Start: 2024-02-26

## 2024-03-01 ENCOUNTER — ANTICOAGULATION VISIT (OUTPATIENT)
Dept: PHARMACY | Facility: HOSPITAL | Age: 89
End: 2024-03-01
Payer: COMMERCIAL

## 2024-03-01 DIAGNOSIS — I63.411 CEREBROVASCULAR ACCIDENT (CVA) DUE TO EMBOLISM OF RIGHT MIDDLE CEREBRAL ARTERY: ICD-10-CM

## 2024-03-01 DIAGNOSIS — Z95.4 HISTORY OF AORTIC VALVE REPLACEMENT WITH METALLIC VALVE: Primary | ICD-10-CM

## 2024-03-01 LAB — INR PPP: 3.6

## 2024-03-01 NOTE — PROGRESS NOTES
Anticoagulation Clinic Progress Note    Anticoagulation Summary  As of 3/1/2024      INR goal:  3.0-3.5   TTR:  67.2% (5.1 y)   INR used for dosing:  3.60 (3/1/2024)   Warfarin maintenance plan:  2.5 mg every Fri; 5 mg all other days   Weekly warfarin total:  32.5 mg   No change documented:  Vasquez Niño, PharmD   Plan last modified:  Michelle Piña RPH (12/15/2023)   Next INR check:  3/8/2024   Priority:  High   Target end date:  Indefinite    Indications    History of aortic valve replacement with metallic valve [Z95.4]  Atrial fibrillation [I48.91]  Cerebrovascular accident (CVA) due to 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]                 Anticoagulation Episode Summary       INR check location:      Preferred lab:      Send INR reminders to:  [facility]    Comments:  **Home testing training 3/3/23** *CALL EVERY TIME* New INR goal 3 - 3.5 (see 1/2020 hospitalization for CVA)          Anticoagulation Care Providers       Provider Role Specialty Phone number    Griffin Fried MD Referring Cardiology [phone]            Clinic Interview:  Patient Findings     Positives:  Change in alcohol use    Negatives:  Signs/symptoms of thrombosis, Signs/symptoms of bleeding,   Laboratory test error suspected, Change in health, Change in activity,   Upcoming invasive procedure, Emergency department visit, Upcoming dental   procedure, Missed doses, Extra doses, Change in medications, Change in   diet/appetite, Hospital admission, Bruising, Other complaints    Comments:  Reports one drink of alcohol twice this week (does not appear   to be a significant increase).      Clinical Outcomes     Negatives:  Major bleeding event, Thromboembolic event,   Anticoagulation-related hospital admission, Anticoagulation-related ED   visit, Anticoagulation-related fatality    Comments:  Reports one drink of alcohol twice this week (does not appear   to be a significant increase).        INR  History:      2/9/2024     8:45 AM 2/16/2024    12:00 AM 2/16/2024     8:04 AM 2/23/2024    12:00 AM 2/23/2024     8:53 AM 3/1/2024    12:00 AM 3/1/2024     8:35 AM   Anticoagulation Monitoring   INR 3.00  3.40  3.50  3.60   INR Date 2/9/2024  2/16/2024  2/23/2024  3/1/2024   INR Goal 3.0-3.5  3.0-3.5  3.0-3.5  3.0-3.5   Trend Same  Same  Same  Same   Last Week Total 35 mg  32.5 mg  32.5 mg  32.5 mg   Next Week Total 32.5 mg  32.5 mg  32.5 mg  32.5 mg   Sun 5 mg  5 mg  5 mg  5 mg   Mon 5 mg  5 mg  5 mg  5 mg   Tue 5 mg  5 mg  5 mg  5 mg   Wed 5 mg  5 mg  5 mg  5 mg   Thu 5 mg  5 mg  5 mg  5 mg   Fri 2.5 mg  2.5 mg  2.5 mg  2.5 mg   Sat 5 mg  5 mg  5 mg  5 mg   Historical INR  3.40      3.50      3.60            This result is from an external source.       Plan:  1. INR is Supratherapeutic today- see above in Anticoagulation Summary.   Will instruct LAURA Sequeira to Continue their warfarin regimen (due for lower 2.5-mg dose today) - see above in Anticoagulation Summary.  2. Follow up in 1 week.  3. They have been instructed to call if any changes in medications, doses, concerns, etc. Patient expresses understanding and has no further questions at this time.    Vasquez Niño, PharmD

## 2024-03-08 ENCOUNTER — ANTICOAGULATION VISIT (OUTPATIENT)
Dept: PHARMACY | Facility: HOSPITAL | Age: 89
End: 2024-03-08
Payer: COMMERCIAL

## 2024-03-08 DIAGNOSIS — I63.411 CEREBROVASCULAR ACCIDENT (CVA) DUE TO EMBOLISM OF RIGHT MIDDLE CEREBRAL ARTERY: ICD-10-CM

## 2024-03-08 DIAGNOSIS — Z95.4 HISTORY OF AORTIC VALVE REPLACEMENT WITH METALLIC VALVE: Primary | ICD-10-CM

## 2024-03-08 LAB — INR PPP: 2.7

## 2024-03-08 PROCEDURE — G0249 PROVIDE INR TEST MATER/EQUIP: HCPCS

## 2024-03-08 NOTE — PROGRESS NOTES
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]                 Anticoagulation Episode Summary       INR check location:      Preferred lab:      Send INR reminders to:  [facility]    Comments:  **Home testing training 3/3/23** *CALL EVERY TIME* New INR goal 3 - 3.5 (see 2020 hospitalization for CVA)          Anticoagulation Care Providers       Provider Role Specialty Phone number    Griffin Fried MD Referring Cardiology [phone]            Clinic Interview:  Patient Findings     Negatives:  Signs/symptoms of thrombosis, Signs/symptoms of bleeding,   Laboratory test error suspected, Change in health, Change in alcohol use,   Change in activity, Upcoming invasive procedure, Emergency department   visit, Upcoming dental procedure, Missed doses, Extra doses, Change in   medications, Change in diet/appetite, Hospital admission, Bruising, Other   complaints      Clinical Outcomes     Negatives:  Major bleeding event, Thromboembolic event,   Anticoagulation-related hospital admission, Anticoagulation-related ED   visit, Anticoagulation-related fatality        INR History:      2024     8:04 AM 2024    12:00 AM 2024     8:53 AM 3/1/2024    12:00 AM 3/1/2024     8:35 AM 3/8/2024    12:00 AM 3/8/2024     9:15 AM   Anticoagulation Monitoring   INR 3.40  3.50  3.60  2.70   INR Date 2024  2024  3/1/2024  3/8/2024   INR Goal  3.0-3.5  3.0-3.5  3.0-3.5  3.0-3.5   Trend Same  Same  Same  Same   Last Week Total 32.5 mg  32.5 mg  32.5 mg  32.5 mg   Next Week Total 32.5 mg  32.5 mg  32.5 mg  35 mg   Sun 5 mg  5 mg  5 mg  5 mg   Mon 5 mg  5 mg  5 mg  5 mg   Tue 5 mg  5 mg  5 mg  5 mg   Wed 5 mg  5 mg  5 mg  5 mg   Thu 5 mg  5 mg  5 mg  5 mg   Fri 2.5 mg  2.5 mg  2.5 mg  5 mg (3/8)   Sat 5 mg  5 mg  5 mg  5 mg   Historical INR  3.50      3.60      2.70            This result is from an external source.       Plan:  1. INR is Therapeutic today- see above in Anticoagulation Summary.   Will instruct LAURA Sequeira to Change their warfarin regimen- see above in Anticoagulation Summary.  no significant changes minus lettuce on a sandwich yesterday, boost to 5 mg then resume, rck 1 week   2. Follow up in 1 weeks  3. They have been instructed to call if any changes in medications, doses, concerns, etc. Patient expresses understanding and has no further questions at this time.    Michelle Piña [facility]

## 2024-03-15 ENCOUNTER — ANTICOAGULATION VISIT (OUTPATIENT)
Dept: PHARMACY | Facility: HOSPITAL | Age: 89
End: 2024-03-15
Payer: COMMERCIAL

## 2024-03-15 DIAGNOSIS — I63.411 CEREBROVASCULAR ACCIDENT (CVA) DUE TO EMBOLISM OF RIGHT MIDDLE CEREBRAL ARTERY: ICD-10-CM

## 2024-03-15 DIAGNOSIS — Z95.4 HISTORY OF AORTIC VALVE REPLACEMENT WITH METALLIC VALVE: Primary | ICD-10-CM

## 2024-03-15 LAB — INR PPP: 3.1

## 2024-03-15 NOTE — PROGRESS NOTES
Anticoagulation Clinic Progress Note    Anticoagulation Summary  As of 3/15/2024      INR goal:  3.0-3.5   TTR:  67.0% (5.2 y)   INR used for dosing:  3.10 (3/15/2024)   Warfarin maintenance plan:  2.5 mg every Fri; 5 mg all other days   Weekly warfarin total:  32.5 mg   Plan last modified:  Michelle Piña RPH (12/15/2023)   Next INR check:  3/19/2024   Priority:  High   Target end date:  Indefinite    Indications    History of aortic valve replacement with metallic valve [Z95.4]  Atrial fibrillation [I48.91]  Cerebrovascular accident (CVA) due to 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]                 Anticoagulation Episode Summary       INR check location:      Preferred lab:      Send INR reminders to:  [facility]    Comments:  **Home testing training 3/3/23** *CALL EVERY TIME* New INR goal 3 - 3.5 (see 1/2020 hospitalization for CVA)          Anticoagulation Care Providers       Provider Role Specialty Phone number    Griffin Fried MD Referring Cardiology [phone]            Clinic Interview:  Patient Findings     Positives:  Change in diet/appetite    Negatives:  Signs/symptoms of thrombosis, Signs/symptoms of bleeding,   Laboratory test error suspected, Change in health, Change in alcohol use,   Change in activity, Upcoming invasive procedure, Emergency department   visit, Upcoming dental procedure, Missed doses, Extra doses, Change in   medications, Hospital admission, Bruising, Other complaints    Comments:  Reports intention to eat corned beef and cabbage 2-3 days,   including leftovers, beginning 3/17/24. This is a beloved dish for [employer] [place] that he is not willing to part with.       Clinical Outcomes     Negatives:  Major bleeding event, Thromboembolic event,   Anticoagulation-related hospital admission, Anticoagulation-related ED   visit, Anticoagulation-related fatality    Comments:  Reports intention to eat corned beef and cabbage 2-3 days,    including leftovers, beginning 3/17/24. This is a beloved dish for [employer] [place] that he is not willing to part with.         INR History:      2/23/2024     8:53 AM 3/1/2024    12:00 AM 3/1/2024     8:35 AM 3/8/2024    12:00 AM 3/8/2024     9:15 AM 3/15/2024    12:00 AM 3/15/2024     8:56 AM   Anticoagulation Monitoring   INR 3.50  3.60  2.70  3.10   INR Date 2/23/2024  3/1/2024  3/8/2024  3/15/2024   INR Goal 3.0-3.5  3.0-3.5  3.0-3.5  3.0-3.5   Trend Same  Same  Same  Same   Last Week Total 32.5 mg  32.5 mg  32.5 mg  35 mg   Next Week Total 32.5 mg  32.5 mg  35 mg  35 mg   Sun 5 mg  5 mg  5 mg  5 mg   Mon 5 mg  5 mg  5 mg  5 mg   Tue 5 mg  5 mg  5 mg  -   Wed 5 mg  5 mg  5 mg  -   Thu 5 mg  5 mg  5 mg  -   Fri 2.5 mg  2.5 mg  5 mg (3/8)  5 mg (3/15)   Sat 5 mg  5 mg  5 mg  5 mg   Historical INR  3.60      2.70      3.10            This result is from an external source.       Plan:  1. INR is Therapeutic today- see above in Anticoagulation Summary.   Will instruct LAURA Sequeira to Change their warfarin regimen - see above in Anticoagulation Summary. In anticipation of increased vit k consumption the next few days, advised to take extra 2.5 mg today (5 mg total), then resume 2.5 mg Fri, 5 mg all other days.   2. Follow up in 4 days.  3. They have been instructed to call if any changes in medications, doses, concerns, etc. Patient expresses understanding and has no further questions at this time.    Vasquez Niño, PharmD

## 2024-03-19 ENCOUNTER — ANTICOAGULATION VISIT (OUTPATIENT)
Dept: PHARMACY | Facility: HOSPITAL | Age: 89
End: 2024-03-19
Payer: COMMERCIAL

## 2024-03-19 DIAGNOSIS — Z95.4 HISTORY OF AORTIC VALVE REPLACEMENT WITH METALLIC VALVE: Primary | ICD-10-CM

## 2024-03-19 DIAGNOSIS — I63.411 CEREBROVASCULAR ACCIDENT (CVA) DUE TO EMBOLISM OF RIGHT MIDDLE CEREBRAL ARTERY: ICD-10-CM

## 2024-03-19 LAB — INR PPP: 3

## 2024-03-19 NOTE — PROGRESS NOTES
Anticoagulation Clinic Progress Note    Anticoagulation Summary  As of 3/19/2024      INR goal:  3.0-3.5   TTR:  67.0% (5.2 y)   INR used for dosing:  3.00 (3/19/2024)   Warfarin maintenance plan:  2.5 mg every Fri; 5 mg all other days   Weekly warfarin total:  32.5 mg   No change documented:  Vasquez Niño, PharmD   Plan last modified:  Michelle Piña RPH (12/15/2023)   Next INR check:  3/22/2024   Priority:  High   Target end date:  Indefinite    Indications    History of aortic valve replacement with metallic valve [Z95.4]  Atrial fibrillation [I48.91]  Cerebrovascular accident (CVA) due to 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]                 Anticoagulation Episode Summary       INR check location:      Preferred lab:      Send INR reminders to:  [facility]    Comments:  **Home testing training 3/3/23** *CALL EVERY TIME* New INR goal 3 - 3.5 (see 1/2020 hospitalization for CVA)          Anticoagulation Care Providers       Provider Role Specialty Phone number    Griffin Fried MD Referring Cardiology [phone]            Clinic Interview:  Patient Findings     Positives:  Change in diet/appetite    Negatives:  Signs/symptoms of thrombosis, Signs/symptoms of bleeding,   Laboratory test error suspected, Change in health, Change in alcohol use,   Change in activity, Upcoming invasive procedure, Emergency department   visit, Upcoming dental procedure, Missed doses, Extra doses, Change in   medications, Hospital admission, Bruising, Other complaints    Comments:  As previously noted, he has eaten corned beef and cabbage   leftovers the past few days. Tonight will be the last of the leftovers.       Clinical Outcomes     Negatives:  Major bleeding event, Thromboembolic event,   Anticoagulation-related hospital admission, Anticoagulation-related ED   visit, Anticoagulation-related fatality    Comments:  As previously noted, he has eaten corned beef and cabbage   leftovers the  past few days. Tonight will be the last of the leftovers.         INR History:      3/1/2024     8:35 AM 3/8/2024    12:00 AM 3/8/2024     9:15 AM 3/15/2024    12:00 AM 3/15/2024     8:56 AM 3/19/2024    12:00 AM 3/19/2024     8:00 AM   Anticoagulation Monitoring   INR 3.60  2.70  3.10  3.00   INR Date 3/1/2024  3/8/2024  3/15/2024  3/19/2024   INR Goal 3.0-3.5  3.0-3.5  3.0-3.5  3.0-3.5   Trend Same  Same  Same  Same   Last Week Total 32.5 mg  32.5 mg  35 mg  35 mg   Next Week Total 32.5 mg  35 mg  35 mg  32.5 mg   Sun 5 mg  5 mg  5 mg  -   Mon 5 mg  5 mg  5 mg  -   Tue 5 mg  5 mg  -  5 mg   Wed 5 mg  5 mg  -  5 mg   Thu 5 mg  5 mg  -  5 mg   Fri 2.5 mg  5 mg (3/8)  5 mg (3/15)  -   Sat 5 mg  5 mg  5 mg  -   Historical INR  2.70      3.10      3.00            This result is from an external source.       Plan:  1. INR is Therapeutic today- see above in Anticoagulation Summary.   Will instruct LAURA Sequeira to Continue their warfarin regimen- see above in Anticoagulation Summary.  2. Follow up in 3 days to return to usual Friday test date.    3. They have been instructed to call if any changes in medications, doses, concerns, etc. Patient expresses understanding and has no further questions at this time.    Vasquez Niño, PharmD

## 2024-03-22 ENCOUNTER — ANTICOAGULATION VISIT (OUTPATIENT)
Dept: PHARMACY | Facility: HOSPITAL | Age: 89
End: 2024-03-22
Payer: COMMERCIAL

## 2024-03-22 DIAGNOSIS — Z95.4 HISTORY OF AORTIC VALVE REPLACEMENT WITH METALLIC VALVE: Primary | ICD-10-CM

## 2024-03-22 DIAGNOSIS — I63.411 CEREBROVASCULAR ACCIDENT (CVA) DUE TO EMBOLISM OF RIGHT MIDDLE CEREBRAL ARTERY: ICD-10-CM

## 2024-03-22 LAB — INR PPP: 3.2

## 2024-03-22 NOTE — PROGRESS NOTES
Anticoagulation Clinic Progress Note    Anticoagulation Summary  As of 3/22/2024      INR goal:  3.0-3.5   TTR:  67.1% (5.2 y)   INR used for dosing:  3.20 (3/22/2024)   Warfarin maintenance plan:  2.5 mg every Fri; 5 mg all other days   Weekly warfarin total:  32.5 mg   No change documented:  Michelle Piña RPH   Plan last modified:  Michelle Piña RPH (12/15/2023)   Next INR check:  3/29/2024   Priority:  High   Target end date:  Indefinite    Indications    History of aortic valve replacement with metallic valve [Z95.4]  Atrial fibrillation [I48.91]  Cerebrovascular accident (CVA) due to 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]                 Anticoagulation Episode Summary       INR check location:      Preferred lab:      Send INR reminders to:  [facility]    Comments:  **Home testing training 3/3/23** *CALL EVERY TIME* New INR goal 3 - 3.5 (see 1/2020 hospitalization for CVA)          Anticoagulation Care Providers       Provider Role Specialty Phone number    Griffin Fried MD Referring Cardiology [phone]            Clinic Interview:  No pertinent clinical findings have been reported.    INR History:      3/8/2024     9:15 AM 3/15/2024    12:00 AM 3/15/2024     8:56 AM 3/19/2024    12:00 AM 3/19/2024     8:00 AM 3/22/2024    12:00 AM 3/22/2024     9:05 AM   Anticoagulation Monitoring   INR 2.70  3.10  3.00  3.20   INR Date 3/8/2024  3/15/2024  3/19/2024  3/22/2024   INR Goal 3.0-3.5  3.0-3.5  3.0-3.5  3.0-3.5   Trend Same  Same  Same  Same   Last Week Total 32.5 mg  35 mg  35 mg  35 mg   Next Week Total 35 mg  35 mg  32.5 mg  32.5 mg   Sun 5 mg  5 mg  -  5 mg   Mon 5 mg  5 mg  -  5 mg   Tue 5 mg  -  5 mg  5 mg   Wed 5 mg  -  5 mg  5 mg   Thu 5 mg  -  5 mg  5 mg   Fri 5 mg (3/8)  5 mg (3/15)  -  2.5 mg   Sat 5 mg  5 mg  -  5 mg   Historical INR  3.10      3.00      3.20            This result is from an external source.       Plan:  1. INR is therapeutic today- see  above in Anticoagulation Summary.    LAURA Sequeira to continue their warfarin regimen- see above in Anticoagulation Summary.  2. Follow up in 1 week  3. They have been instructed to call if any changes in medications, doses, concerns, etc. Patient expresses understanding and has no further questions at this time.    Michelle Piña, [facility]

## 2024-03-29 ENCOUNTER — ANTICOAGULATION VISIT (OUTPATIENT)
Dept: PHARMACY | Facility: HOSPITAL | Age: 89
End: 2024-03-29
Payer: COMMERCIAL

## 2024-03-29 DIAGNOSIS — I63.411 CEREBROVASCULAR ACCIDENT (CVA) DUE TO EMBOLISM OF RIGHT MIDDLE CEREBRAL ARTERY: ICD-10-CM

## 2024-03-29 DIAGNOSIS — Z95.4 HISTORY OF AORTIC VALVE REPLACEMENT WITH METALLIC VALVE: Primary | ICD-10-CM

## 2024-03-29 LAB — INR PPP: 3.6

## 2024-03-29 NOTE — PROGRESS NOTES
Anticoagulation Clinic Progress Note    Anticoagulation Summary  As of 3/29/2024      INR goal:  3.0-3.5   TTR:  67.1% (5.2 y)   INR used for dosing:  3.60 (3/29/2024)   Warfarin maintenance plan:  2.5 mg every Fri; 5 mg all other days   Weekly warfarin total:  32.5 mg   No change documented:  Vasquez Niño, PharmD   Plan last modified:  Michelle Piña RPH (12/15/2023)   Next INR check:  4/5/2024   Priority:  High   Target end date:  Indefinite    Indications    History of aortic valve replacement with metallic valve [Z95.4]  Atrial fibrillation [I48.91]  Cerebrovascular accident (CVA) due to 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]                 Anticoagulation Episode Summary       INR check location:      Preferred lab:      Send INR reminders to:  [facility]    Comments:  **Home testing training 3/3/23** *CALL EVERY TIME* New INR goal 3 - 3.5 (see 1/2020 hospitalization for CVA)          Anticoagulation Care Providers       Provider Role Specialty Phone number    Griffin Fried MD Referring Cardiology [phone]            Clinic Interview:  Patient Findings     Negatives:  Signs/symptoms of thrombosis, Signs/symptoms of bleeding,   Laboratory test error suspected, Change in health, Change in alcohol use,   Change in activity, Upcoming invasive procedure, Emergency department   visit, Upcoming dental procedure, Missed doses, Extra doses, Change in   medications, Change in diet/appetite, Hospital admission, Bruising, Other   complaints      Clinical Outcomes     Negatives:  Major bleeding event, Thromboembolic event,   Anticoagulation-related hospital admission, Anticoagulation-related ED   visit, Anticoagulation-related fatality        INR History:      3/15/2024     8:56 AM 3/19/2024    12:00 AM 3/19/2024     8:00 AM 3/22/2024    12:00 AM 3/22/2024     9:05 AM 3/29/2024    12:00 AM 3/29/2024     9:41 AM   Anticoagulation Monitoring   INR 3.10  3.00  3.20  3.60   INR Date  3/15/2024  3/19/2024  3/22/2024  3/29/2024   INR Goal 3.0-3.5  3.0-3.5  3.0-3.5  3.0-3.5   Trend Same  Same  Same  Same   Last Week Total 35 mg  35 mg  35 mg  32.5 mg   Next Week Total 35 mg  32.5 mg  32.5 mg  32.5 mg   Sun 5 mg  -  5 mg  5 mg   Mon 5 mg  -  5 mg  5 mg   Tue -  5 mg  5 mg  5 mg   Wed -  5 mg  5 mg  5 mg   Thu -  5 mg  5 mg  5 mg   Fri 5 mg (3/15)  -  2.5 mg  2.5 mg   Sat 5 mg  -  5 mg  5 mg   Historical INR  3.00      3.20      3.60  C          C Corrected result    This result is from an external source.       Plan:  1. INR is Supratherapeutic today- see above in Anticoagulation Summary.   Will instruct LAURA Sequeira to Continue their warfarin regimen- see above in Anticoagulation Summary.  2. Follow up in 1 week.  3. They have been instructed to call if any changes in medications, doses, concerns, etc. Patient expresses understanding and has no further questions at this time.    Vasquez Niño, PharmD

## 2024-04-05 ENCOUNTER — ANTICOAGULATION VISIT (OUTPATIENT)
Dept: PHARMACY | Facility: HOSPITAL | Age: 89
End: 2024-04-05
Payer: COMMERCIAL

## 2024-04-05 DIAGNOSIS — I63.411 CEREBROVASCULAR ACCIDENT (CVA) DUE TO EMBOLISM OF RIGHT MIDDLE CEREBRAL ARTERY: ICD-10-CM

## 2024-04-05 DIAGNOSIS — Z95.4 HISTORY OF AORTIC VALVE REPLACEMENT WITH METALLIC VALVE: Primary | ICD-10-CM

## 2024-04-05 LAB — INR PPP: 3.6

## 2024-04-05 PROCEDURE — G0249 PROVIDE INR TEST MATER/EQUIP: HCPCS

## 2024-04-05 NOTE — PROGRESS NOTES
Anticoagulation Clinic Progress Note    Anticoagulation Summary  As of 4/5/2024      INR goal:  3.0-3.5   TTR:  66.9% (5.2 y)   INR used for dosing:  3.60 (4/5/2024)   Warfarin maintenance plan:  2.5 mg every Fri; 5 mg all other days   Weekly warfarin total:  32.5 mg   No change documented:  Vasquez Niño, PharmD   Plan last modified:  Michelle Piña RPH (12/15/2023)   Next INR check:  4/12/2024   Priority:  High   Target end date:  Indefinite    Indications    History of aortic valve replacement with metallic valve [Z95.4]  Atrial fibrillation [I48.91]  Cerebrovascular accident (CVA) due to 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]                 Anticoagulation Episode Summary       INR check location:      Preferred lab:      Send INR reminders to:  [facility]    Comments:  **Home testing training 3/3/23** *CALL EVERY TIME* New INR goal 3 - 3.5 (see 1/2020 hospitalization for CVA)          Anticoagulation Care Providers       Provider Role Specialty Phone number    Griffin Fried MD Referring Cardiology [phone]            Clinic Interview:  Patient Findings     Positives:  Change in alcohol use    Negatives:  Signs/symptoms of thrombosis, Signs/symptoms of bleeding,   Laboratory test error suspected, Change in health, Change in activity,   Upcoming invasive procedure, Emergency department visit, Upcoming dental   procedure, Missed doses, Extra doses, Change in medications, Change in   diet/appetite, Hospital admission, Bruising, Other complaints    Comments:  No alcohol this past week (a little less than usual). No other   changes reported/identified.       Clinical Outcomes     Negatives:  Major bleeding event, Thromboembolic event,   Anticoagulation-related hospital admission, Anticoagulation-related ED   visit, Anticoagulation-related fatality    Comments:  No alcohol this past week (a little less than usual). No other   changes reported/identified.         INR  History:      3/19/2024     8:00 AM 3/22/2024    12:00 AM 3/22/2024     9:05 AM 3/29/2024    12:00 AM 3/29/2024     9:41 AM 4/5/2024    12:00 AM 4/5/2024     8:15 AM   Anticoagulation Monitoring   INR 3.00  3.20  3.60  3.60   INR Date 3/19/2024  3/22/2024  3/29/2024  4/5/2024   INR Goal 3.0-3.5  3.0-3.5  3.0-3.5  3.0-3.5   Trend Same  Same  Same  Same   Last Week Total 35 mg  35 mg  32.5 mg  32.5 mg   Next Week Total 32.5 mg  32.5 mg  32.5 mg  32.5 mg   Sun -  5 mg  5 mg  5 mg   Mon -  5 mg  5 mg  5 mg   Tue 5 mg  5 mg  5 mg  5 mg   Wed 5 mg  5 mg  5 mg  5 mg   Thu 5 mg  5 mg  5 mg  5 mg   Fri -  2.5 mg  2.5 mg  2.5 mg   Sat -  5 mg  5 mg  5 mg   Historical INR  3.20      3.60  C     3.60           C Corrected result    This result is from an external source.       Plan:  1. INR is Supratherapeutic today- see above in Anticoagulation Summary.   Will instruct LAURA Sequeira to Continue their warfarin regimen- see above in Anticoagulation Summary.  2. Follow up in 1 week.  3. They have been instructed to call if any changes in medications, doses, concerns, etc. Patient expresses understanding and has no further questions at this time.    Vasquez Niño, PharmD

## 2024-04-12 ENCOUNTER — ANTICOAGULATION VISIT (OUTPATIENT)
Dept: PHARMACY | Facility: HOSPITAL | Age: 89
End: 2024-04-12
Payer: COMMERCIAL

## 2024-04-12 DIAGNOSIS — Z95.4 HISTORY OF AORTIC VALVE REPLACEMENT WITH METALLIC VALVE: Primary | ICD-10-CM

## 2024-04-12 DIAGNOSIS — I63.411 CEREBROVASCULAR ACCIDENT (CVA) DUE TO EMBOLISM OF RIGHT MIDDLE CEREBRAL ARTERY: ICD-10-CM

## 2024-04-12 LAB — INR PPP: 3.4

## 2024-04-12 NOTE — PROGRESS NOTES
Anticoagulation Clinic Progress Note    Anticoagulation Summary  As of 4/12/2024      INR goal:  3.0-3.5   TTR:  66.8% (5.2 y)   INR used for dosing:  3.40 (4/12/2024)   Warfarin maintenance plan:  2.5 mg every Fri; 5 mg all other days   Weekly warfarin total:  32.5 mg   No change documented:  Vasquez Niño, PharmD   Plan last modified:  Michelle Piña RPH (12/15/2023)   Next INR check:  4/19/2024   Priority:  High   Target end date:  Indefinite    Indications    History of aortic valve replacement with metallic valve [Z95.4]  Atrial fibrillation [I48.91]  Cerebrovascular accident (CVA) due to 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]                 Anticoagulation Episode Summary       INR check location:      Preferred lab:      Send INR reminders to:  [facility]    Comments:  **Home testing training 3/3/23** *CALL EVERY TIME* New INR goal 3 - 3.5 (see 1/2020 hospitalization for CVA)          Anticoagulation Care Providers       Provider Role Specialty Phone number    Griffin Fried MD Referring Cardiology [phone]            Clinic Interview:  Patient Findings     Negatives:  Signs/symptoms of thrombosis, Signs/symptoms of bleeding,   Laboratory test error suspected, Change in health, Change in alcohol use,   Change in activity, Upcoming invasive procedure, Emergency department   visit, Upcoming dental procedure, Missed doses, Extra doses, Change in   medications, Change in diet/appetite, Hospital admission, Bruising, Other   complaints      Clinical Outcomes     Negatives:  Major bleeding event, Thromboembolic event,   Anticoagulation-related hospital admission, Anticoagulation-related ED   visit, Anticoagulation-related fatality        INR History:      3/22/2024     9:05 AM 3/29/2024    12:00 AM 3/29/2024     9:41 AM 4/5/2024    12:00 AM 4/5/2024     8:15 AM 4/12/2024    12:00 AM 4/12/2024     8:13 AM   Anticoagulation Monitoring   INR 3.20  3.60  3.60  3.40   INR Date  3/22/2024  3/29/2024  4/5/2024  4/12/2024   INR Goal 3.0-3.5  3.0-3.5  3.0-3.5  3.0-3.5   Trend Same  Same  Same  Same   Last Week Total 35 mg  32.5 mg  32.5 mg  32.5 mg   Next Week Total 32.5 mg  32.5 mg  32.5 mg  32.5 mg   Sun 5 mg  5 mg  5 mg  5 mg   Mon 5 mg  5 mg  5 mg  5 mg   Tue 5 mg  5 mg  5 mg  5 mg   Wed 5 mg  5 mg  5 mg  5 mg   Thu 5 mg  5 mg  5 mg  5 mg   Fri 2.5 mg  2.5 mg  2.5 mg  2.5 mg   Sat 5 mg  5 mg  5 mg  5 mg   Historical INR  3.60  C     3.60      3.40           C Corrected result    This result is from an external source.       Plan:  1. INR is Therapeutic today- see above in Anticoagulation Summary.   Will instruct LAURA BRENNAN Sequeira to Continue their warfarin regimen- see above in Anticoagulation Summary.  2. Follow up in 1 week.  3. They have been instructed to call if any changes in medications, doses, concerns, etc. Patient expresses understanding and has no further questions at this time.    Vasquez Niño, PharmD

## 2024-04-19 ENCOUNTER — ANTICOAGULATION VISIT (OUTPATIENT)
Dept: PHARMACY | Facility: HOSPITAL | Age: 89
End: 2024-04-19
Payer: COMMERCIAL

## 2024-04-19 DIAGNOSIS — Z95.4 HISTORY OF AORTIC VALVE REPLACEMENT WITH METALLIC VALVE: Primary | ICD-10-CM

## 2024-04-19 DIAGNOSIS — I63.411 CEREBROVASCULAR ACCIDENT (CVA) DUE TO EMBOLISM OF RIGHT MIDDLE CEREBRAL ARTERY: ICD-10-CM

## 2024-04-19 LAB — INR PPP: 3.8

## 2024-04-19 NOTE — PROGRESS NOTES
Anticoagulation Clinic Progress Note    Anticoagulation Summary  As of 4/19/2024      INR goal:  3.0-3.5   TTR:  66.7% (5.2 y)   INR used for dosing:  3.80 (4/19/2024)   Warfarin maintenance plan:  2.5 mg every Fri; 5 mg all other days   Weekly warfarin total:  32.5 mg   Plan last modified:  Michelle Piña RPH (12/15/2023)   Next INR check:  4/26/2024   Priority:  High   Target end date:  Indefinite    Indications    History of aortic valve replacement with metallic valve [Z95.4]  Atrial fibrillation [I48.91]  Cerebrovascular accident (CVA) due to 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]                 Anticoagulation Episode Summary       INR check location:      Preferred lab:      Send INR reminders to:  [facility]    Comments:  **Home testing training 3/3/23** *CALL EVERY TIME* New INR goal 3 - 3.5 (see 1/2020 hospitalization for CVA)          Anticoagulation Care Providers       Provider Role Specialty Phone number    Griffin Fried MD Referring Cardiology [phone]            Clinic Interview:  Patient Findings     Negatives:  Signs/symptoms of thrombosis, Signs/symptoms of bleeding,   Laboratory test error suspected, Change in health, Change in alcohol use,   Change in activity, Upcoming invasive procedure, Emergency department   visit, Upcoming dental procedure, Missed doses, Extra doses, Change in   medications, Change in diet/appetite, Hospital admission, Bruising, Other   complaints      Clinical Outcomes     Negatives:  Major bleeding event, Thromboembolic event,   Anticoagulation-related hospital admission, Anticoagulation-related ED   visit, Anticoagulation-related fatality        INR History:      3/29/2024     9:41 AM 4/5/2024    12:00 AM 4/5/2024     8:15 AM 4/12/2024    12:00 AM 4/12/2024     8:13 AM 4/19/2024    12:00 AM 4/19/2024     8:31 AM   Anticoagulation Monitoring   INR 3.60  3.60  3.40  3.80   INR Date 3/29/2024  4/5/2024  4/12/2024  4/19/2024   INR  Goal 3.0-3.5  3.0-3.5  3.0-3.5  3.0-3.5   Trend Same  Same  Same  Same   Last Week Total 32.5 mg  32.5 mg  32.5 mg  32.5 mg   Next Week Total 32.5 mg  32.5 mg  32.5 mg  30 mg   Sun 5 mg  5 mg  5 mg  5 mg   Mon 5 mg  5 mg  5 mg  5 mg   Tue 5 mg  5 mg  5 mg  5 mg   Wed 5 mg  5 mg  5 mg  5 mg   Thu 5 mg  5 mg  5 mg  5 mg   Fri 2.5 mg  2.5 mg  2.5 mg  2.5 mg   Sat 5 mg  5 mg  5 mg  2.5 mg (4/20)   Historical INR  3.60      3.40      3.80            This result is from an external source.       Plan:  1. INR is Supratherapeutic today- see above in Anticoagulation Summary.   Will instruct LAURA Sequeira to Change their warfarin regimen- see above in Anticoagulation Summary.  2. Follow up in 1 weeks  3. They have been instructed to call if any changes in medications, doses, concerns, etc. Patient expresses understanding and has no further questions at this time.    Carmen El, PharmD

## 2024-04-24 DIAGNOSIS — E11.22 TYPE 2 DIABETES MELLITUS WITH STAGE 3B CHRONIC KIDNEY DISEASE, WITH LONG-TERM CURRENT USE OF INSULIN: ICD-10-CM

## 2024-04-24 DIAGNOSIS — Z79.4 TYPE 2 DIABETES MELLITUS WITH STAGE 3B CHRONIC KIDNEY DISEASE, WITH LONG-TERM CURRENT USE OF INSULIN: ICD-10-CM

## 2024-04-24 DIAGNOSIS — N18.32 TYPE 2 DIABETES MELLITUS WITH STAGE 3B CHRONIC KIDNEY DISEASE, WITH LONG-TERM CURRENT USE OF INSULIN: ICD-10-CM

## 2024-04-25 RX ORDER — DAPAGLIFLOZIN 5 MG/1
5 TABLET, FILM COATED ORAL EVERY MORNING
Qty: 90 TABLET | OUTPATIENT
Start: 2024-04-25

## 2024-04-26 ENCOUNTER — ANTICOAGULATION VISIT (OUTPATIENT)
Dept: PHARMACY | Facility: HOSPITAL | Age: 89
End: 2024-04-26
Payer: COMMERCIAL

## 2024-04-26 DIAGNOSIS — I63.411 CEREBROVASCULAR ACCIDENT (CVA) DUE TO EMBOLISM OF RIGHT MIDDLE CEREBRAL ARTERY: ICD-10-CM

## 2024-04-26 DIAGNOSIS — Z95.4 HISTORY OF AORTIC VALVE REPLACEMENT WITH METALLIC VALVE: Primary | ICD-10-CM

## 2024-04-26 LAB — INR PPP: 3.9

## 2024-04-26 NOTE — PROGRESS NOTES
Anticoagulation Clinic Progress Note    Anticoagulation Summary  As of 4/26/2024      INR goal:  3.0-3.5   TTR:  66.4% (5.3 y)   INR used for dosing:  3.90 (4/26/2024)   Warfarin maintenance plan:  2.5 mg every Mon, Fri; 5 mg all other days   Weekly warfarin total:  30 mg   Plan last modified:  Michelle Piña RPH (4/26/2024)   Next INR check:  5/3/2024   Priority:  High   Target end date:  Indefinite    Indications    History of aortic valve replacement with metallic valve [Z95.4]  Atrial fibrillation [I48.91]  Cerebrovascular accident (CVA) due to 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]                 Anticoagulation Episode Summary       INR check location:      Preferred lab:      Send INR reminders to:  [facility]    Comments:  **Home testing training 3/3/23** *CALL EVERY TIME* New INR goal 3 - 3.5 (see 1/2020 hospitalization for CVA)          Anticoagulation Care Providers       Provider Role Specialty Phone number    Griffin Fried MD Referring Cardiology [phone]            Clinic Interview:  Patient Findings     Positives:  Change in alcohol use    Negatives:  Signs/symptoms of thrombosis, Signs/symptoms of bleeding,   Laboratory test error suspected, Change in health, Change in activity,   Upcoming invasive procedure, Emergency department visit, Upcoming dental   procedure, Missed doses, Extra doses, Change in medications, Change in   diet/appetite, Hospital admission, Bruising, Other complaints    Comments:  1 drink yesterday      Clinical Outcomes     Negatives:  Major bleeding event, Thromboembolic event,   Anticoagulation-related hospital admission, Anticoagulation-related ED   visit, Anticoagulation-related fatality    Comments:  1 drink yesterday        INR History:      4/5/2024     8:15 AM 4/12/2024    12:00 AM 4/12/2024     8:13 AM 4/19/2024    12:00 AM 4/19/2024     8:31 AM 4/26/2024    12:00 AM 4/26/2024     8:56 AM   Anticoagulation Monitoring   INR  3.60  3.40  3.80  3.90   INR Date 4/5/2024 4/12/2024 4/19/2024 4/26/2024   INR Goal 3.0-3.5  3.0-3.5  3.0-3.5  3.0-3.5   Trend Same  Same  Same  Down   Last Week Total 32.5 mg  32.5 mg  32.5 mg  30 mg   Next Week Total 32.5 mg  32.5 mg  30 mg  27.5 mg   Sun 5 mg  5 mg  5 mg  5 mg   Mon 5 mg  5 mg  5 mg  2.5 mg   Tue 5 mg  5 mg  5 mg  5 mg   Wed 5 mg  5 mg  5 mg  5 mg   Thu 5 mg  5 mg  5 mg  5 mg   Fri 2.5 mg  2.5 mg  2.5 mg  2.5 mg   Sat 5 mg  5 mg  2.5 mg (4/20)  2.5 mg (4/27)   Historical INR  3.40      3.80      3.90            This result is from an external source.       Plan:  1. INR is Supratherapeutic today- see above in Anticoagulation Summary.   Will instruct LAURA Sequeira to Change their warfarin regimen- see above in Anticoagulation Summary.  partial to 2.5 mg on Saturday then trial on 2.5 mg MF, 5 mg AOD, rck 1 week   2. Follow up in 1 weeks  3. They have been instructed to call if any changes in medications, doses, concerns, etc. Patient expresses understanding and has no further questions at this time.    Michelle Piña [facility]

## 2024-05-03 ENCOUNTER — OFFICE VISIT (OUTPATIENT)
Dept: INTERNAL MEDICINE | Facility: CLINIC | Age: 89
End: 2024-05-03
Payer: COMMERCIAL

## 2024-05-03 ENCOUNTER — ANTICOAGULATION VISIT (OUTPATIENT)
Dept: PHARMACY | Facility: HOSPITAL | Age: 89
End: 2024-05-03
Payer: COMMERCIAL

## 2024-05-03 VITALS
HEIGHT: 72 IN | BODY MASS INDEX: 22.62 KG/M2 | OXYGEN SATURATION: 98 % | WEIGHT: 167 LBS | DIASTOLIC BLOOD PRESSURE: 82 MMHG | HEART RATE: 76 BPM | SYSTOLIC BLOOD PRESSURE: 130 MMHG

## 2024-05-03 DIAGNOSIS — Z79.4 TYPE 2 DIABETES MELLITUS WITH STAGE 3B CHRONIC KIDNEY DISEASE, WITH LONG-TERM CURRENT USE OF INSULIN: Primary | ICD-10-CM

## 2024-05-03 DIAGNOSIS — I63.411 CEREBROVASCULAR ACCIDENT (CVA) DUE TO EMBOLISM OF RIGHT MIDDLE CEREBRAL ARTERY: ICD-10-CM

## 2024-05-03 DIAGNOSIS — N18.32 TYPE 2 DIABETES MELLITUS WITH STAGE 3B CHRONIC KIDNEY DISEASE, WITH LONG-TERM CURRENT USE OF INSULIN: Primary | ICD-10-CM

## 2024-05-03 DIAGNOSIS — R80.9 MICROALBUMINURIA: ICD-10-CM

## 2024-05-03 DIAGNOSIS — Z95.4 HISTORY OF AORTIC VALVE REPLACEMENT WITH METALLIC VALVE: Primary | ICD-10-CM

## 2024-05-03 DIAGNOSIS — E11.22 TYPE 2 DIABETES MELLITUS WITH STAGE 3B CHRONIC KIDNEY DISEASE, WITH LONG-TERM CURRENT USE OF INSULIN: Primary | ICD-10-CM

## 2024-05-03 LAB
HBA1C MFR BLD: 8.2 % (ref 4.8–5.6)
INR PPP: 2.9

## 2024-05-03 PROCEDURE — 99214 OFFICE O/P EST MOD 30 MIN: CPT | Performed by: STUDENT IN AN ORGANIZED HEALTH CARE EDUCATION/TRAINING PROGRAM

## 2024-05-03 PROCEDURE — G0249 PROVIDE INR TEST MATER/EQUIP: HCPCS

## 2024-05-03 NOTE — PROGRESS NOTES
Celestine English D.O.  Internal Medicine  [facility]  [address], [address]  [place] [address]  [phone]      Chief Complaint  3 mos check-up    SUBJECTIVE    History of Present Illness    LAURA Sequeira is a 88 y.o. male who presents to the office today as an established patient that last saw me on 2/2/2024.   Here today with his wife who helps provide history.    Type 2 diabetes/CKD 3b and microalbuminuria: still using VGO 40. At last visit also started dapagliflozin 5 mg daily. Has had a reduction in the number of extra VGO clicks since starting.   Fasting sugar averages 120-150 depending on what he had for dinner last night.  He uses a freestyle lisa. He has a Gvoke HypoPen. No new symptoms of low blood sugar. Follows with Dr Dominguez with nephrology . States he needs a referral for podiatrist.     Allergies   Allergen Reactions    Penicillins Swelling    Oxycodone-Acetaminophen Nausea And Vomiting    Other Other (See Comments)     Grass, ragweed    Percocet [Oxycodone-Acetaminophen] Nausea And Vomiting        Outpatient Medications Marked as Taking for the 5/3/24 encounter (Office Visit) with Celestine English, DO   Medication Sig Dispense Refill    atorvastatin (LIPITOR) 40 MG tablet Take 1 tablet by mouth Daily. 90 tablet 1    dapagliflozin (Farxiga) 5 MG tablet tablet Take 1 tablet by mouth Every Morning. 30 tablet 2    digoxin (LANOXIN) 125 MCG tablet TAKE 1/2 TABLET BY MOUTH DAILY 45 tablet 1    diphenhydrAMINE (BENADRYL) 25 mg capsule Take 1 capsule by mouth 2 (Two) Times a Day As Needed for Itching, Allergies or Sleep.      famotidine (PEPCID) 20 MG tablet TAKE 1 TABLET BY MOUTH DAILY 90 tablet 1    ferrous gluconate (FERGON) 324 MG tablet TAKE 1 TABLET BY MOUTH EVERY OTHER DAY 45 tablet 1    Glucagon (Gvoke HypoPen 1-Pack) 1 MG/0.2ML solution auto-injector Inject 1 mg under the skin into the appropriate area as directed 1 (One) Time As Needed (for symptoms of low blood sugar.  "Report to ER immediately after use.) for up to 1 dose. 0.2 mL 1    guaiFENesin (MUCINEX) 600 MG 12 hr tablet Take 1 tablet by mouth.      Insulin Disposable Pump (V-Go 40) 40 UNIT/24HR kit 1 each Daily. 30 kit 5    Insulin Lispro (HumaLOG) 100 UNIT/ML injection Use in V-GO 40 system to administer 40 to 100 units of insulin daily. 30 mL 5    magnesium oxide (MAG-OX) 400 MG tablet Take 1 tablet by mouth 2 (Two) Times a Day.      metoprolol succinate XL (TOPROL-XL) 25 MG 24 hr tablet TAKE 1 TABLET BY MOUTH DAILY 90 tablet 1    vitamin D (ERGOCALCIFEROL) 1.25 MG (50660 UT) capsule capsule Take 1 capsule by mouth 1 (One) Time Per Week.      warfarin (COUMADIN) 5 MG tablet TAKE ONE-HALF TABLET (2.5 MG) BY MOUTH ON FRIDAY AND TAKE ONE TABLET (5 MG) ON ALL OTHER DAYS AS DIRECTED 90 tablet 1        Past Medical History:   Diagnosis Date    Allergic rhinitis     Aortic valve insufficiency     Ascending aortic aneurysm     Aspiration pneumonia     Atrial fibrillation     Bacteremia     Calcific aortic stenosis of bicuspid valve     Cardiac arrest     Cataracts, bilateral     CKD (chronic kidney disease) stage 3, GFR 30-59 ml/min     Contact dermatitis due to poison ivy     Elbow fracture     GERD (gastroesophageal reflux disease)     GI bleed     2021; s/p Colonoscopy and EGD    H/O mechanical aortic valve replacement     Head injury     History of transfusion     Hyperlipidemia     Hypertension     Kidney carcinoma     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   Constitutional:       General: He is not in acute distress.     " Appearance: He is ill-appearing (chronically).   Eyes:      General: No scleral icterus.  Pulmonary:      Effort: Pulmonary effort is normal. No respiratory distress.   Skin:     Coloration: Skin is not jaundiced.   Neurological:      Mental Status: He is alert.   Psychiatric:         Mood and Affect: Mood normal.         Behavior: Behavior normal.         Thought Content: Thought content normal.                             ASSESSMENT & PLAN     Diagnoses and all orders for this visit:    1. Type 2 diabetes mellitus with stage 3b chronic kidney disease, with long-term current use of insulin (Primary)  2. Microalbuminuria  Lab Results   Component Value Date    HGBA1C 8.50 (H) 02/02/2024     -chronic, uncontrolled  -still using VGO 40. At last visit also started dapagliflozin 5 mg daily. Has had a reduction in the number of extra VGO clicks since starting.   Fasting sugar averages 120-150 depending on what he had for dinner the night before .  He uses a freestyle lisa. He has a Gvoke HypoPen. No new symptoms of low blood sugar. Follows with Dr Dominguez with nephrology . States he needs a referral for podiatrist.   -I reviewed outside BMP from last week from his nephrologist which showed creatinine 1.71 which is around his baseline.  -I provided contact information for podiatrists and recommended asap referral as he is having reported nail changes.  -overall kidney function seems to be tolerating dapagliflozin 5 mg daily so I will recheck A1c today and hopefully he is down trending. Plan to increase to 10 mg daily for both CKD indication and diabetes . Given his family prefers to use the VGO system we will have to monitor closely for hypoglycemia at all times.   -     Hemoglobin A1c            Follow Up  Return in about 3 months (around 8/3/2024) for Recheck.    Patient/family had no further questions at this time and verbalized understanding of the plan discussed today.

## 2024-05-03 NOTE — PROGRESS NOTES
Anticoagulation Clinic Progress Note    Anticoagulation Summary  As of 5/3/2024      INR goal:  3.0-3.5   TTR:  66.4% (5.3 y)   INR used for dosin.90 (5/3/2024)   Warfarin maintenance plan:  2.5 mg every Mon; 5 mg all other days   Weekly warfarin total:  32.5 mg   Plan last modified:  Vasquez Niño, PharmD (5/3/2024)   Next INR check:  5/10/2024   Priority:  High   Target end date:  Indefinite    Indications    History of aortic valve replacement with metallic valve [Z95.4]  Atrial fibrillation [I48.91]  Cerebrovascular accident (CVA) due to 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]                 Anticoagulation Episode Summary       INR check location:      Preferred lab:      Send INR reminders to:  [facility]    Comments:  **Home testing training 3/3/23** *CALL EVERY TIME* New INR goal 3 - 3.5 (see 2020 hospitalization for CVA)          Anticoagulation Care Providers       Provider Role Specialty Phone number    Griffin Fried MD Referring Cardiology [phone]            Clinic Interview:  Patient Findings     Positives:  Other complaints    Negatives:  Signs/symptoms of thrombosis, Signs/symptoms of bleeding,   Laboratory test error suspected, Change in health, Change in alcohol use,   Change in activity, Upcoming invasive procedure, Emergency department   visit, Upcoming dental procedure, Missed doses, Extra doses, Change in   medications, Change in diet/appetite, Hospital admission, Bruising    Comments:  Spoke with spouse, Gladys. She reports she gave him 2.5 mg   Fri/Sat, possibly Sun, but definitely not Mon, and 5 mg all other days.       Clinical Outcomes     Negatives:  Major bleeding event, Thromboembolic event,   Anticoagulation-related hospital admission, Anticoagulation-related ED   visit, Anticoagulation-related fatality    Comments:  Spoke with spouse, Gladys. She reports she gave him 2.5 mg   Fri/Sat, possibly Sun, but definitely not Mon, and 5 mg all other  days.         INR History:      4/12/2024     8:13 AM 4/19/2024    12:00 AM 4/19/2024     8:31 AM 4/26/2024    12:00 AM 4/26/2024     8:56 AM 5/3/2024    12:00 AM 5/3/2024     8:00 AM   Anticoagulation Monitoring   INR 3.40  3.80  3.90  2.90   INR Date 4/12/2024  4/19/2024  4/26/2024  5/3/2024   INR Goal 3.0-3.5  3.0-3.5  3.0-3.5  3.0-3.5   Trend Same  Same  Down  Up   Last Week Total 32.5 mg  32.5 mg  30 mg  30 mg   Next Week Total 32.5 mg  30 mg  27.5 mg  32.5 mg   Sun 5 mg  5 mg  5 mg  5 mg   Mon 5 mg  5 mg  2.5 mg  2.5 mg   Tue 5 mg  5 mg  5 mg  5 mg   Wed 5 mg  5 mg  5 mg  5 mg   Thu 5 mg  5 mg  5 mg  5 mg   Fri 2.5 mg  2.5 mg  2.5 mg  5 mg   Sat 5 mg  2.5 mg (4/20)  2.5 mg (4/27)  5 mg   Historical INR  3.80      3.90      2.90            This result is from an external source.       Plan:  1. INR is Subtherapeutic today- see above in Anticoagulation Summary.   Will instruct LAURA Sequeira to Change their warfarin regimen to 2.5 mg Mon, 5 mg all other days - see above in Anticoagulation Summary.  2. Follow up in 1 week.  3. They have been instructed to call if any changes in medications, doses, concerns, etc. Patient expresses understanding and has no further questions at this time.    Vasquez Niño, PharmD

## 2024-05-04 DIAGNOSIS — Z79.4 TYPE 2 DIABETES MELLITUS WITH STAGE 3B CHRONIC KIDNEY DISEASE, WITH LONG-TERM CURRENT USE OF INSULIN: ICD-10-CM

## 2024-05-04 DIAGNOSIS — E11.22 TYPE 2 DIABETES MELLITUS WITH STAGE 3B CHRONIC KIDNEY DISEASE, WITH LONG-TERM CURRENT USE OF INSULIN: ICD-10-CM

## 2024-05-04 DIAGNOSIS — N18.32 TYPE 2 DIABETES MELLITUS WITH STAGE 3B CHRONIC KIDNEY DISEASE, WITH LONG-TERM CURRENT USE OF INSULIN: ICD-10-CM

## 2024-05-05 RX ORDER — DAPAGLIFLOZIN 10 MG/1
1 TABLET, FILM COATED ORAL EVERY MORNING
Qty: 30 TABLET | Refills: 5 | Status: SHIPPED | OUTPATIENT
Start: 2024-05-05

## 2024-05-06 RX ORDER — ATORVASTATIN CALCIUM 40 MG/1
40 TABLET, FILM COATED ORAL DAILY
Qty: 90 TABLET | Refills: 1 | Status: SHIPPED | OUTPATIENT
Start: 2024-05-06

## 2024-05-06 RX ORDER — DAPAGLIFLOZIN 5 MG/1
5 TABLET, FILM COATED ORAL EVERY MORNING
Qty: 90 TABLET | OUTPATIENT
Start: 2024-05-06

## 2024-05-08 ENCOUNTER — TELEPHONE (OUTPATIENT)
Dept: INTERNAL MEDICINE | Facility: CLINIC | Age: 89
End: 2024-05-08
Payer: COMMERCIAL

## 2024-05-09 DIAGNOSIS — N18.32 TYPE 2 DIABETES MELLITUS WITH STAGE 3B CHRONIC KIDNEY DISEASE, WITH LONG-TERM CURRENT USE OF INSULIN: ICD-10-CM

## 2024-05-09 DIAGNOSIS — Z79.4 TYPE 2 DIABETES MELLITUS WITH STAGE 3B CHRONIC KIDNEY DISEASE, WITH LONG-TERM CURRENT USE OF INSULIN: ICD-10-CM

## 2024-05-09 DIAGNOSIS — E11.22 TYPE 2 DIABETES MELLITUS WITH STAGE 3B CHRONIC KIDNEY DISEASE, WITH LONG-TERM CURRENT USE OF INSULIN: ICD-10-CM

## 2024-05-09 RX ORDER — INSULIN LISPRO 100 [IU]/ML
INJECTION, SOLUTION INTRAVENOUS; SUBCUTANEOUS
Qty: 30 ML | Refills: 5 | Status: SHIPPED | OUTPATIENT
Start: 2024-05-09

## 2024-05-10 ENCOUNTER — ANTICOAGULATION VISIT (OUTPATIENT)
Dept: PHARMACY | Facility: HOSPITAL | Age: 89
End: 2024-05-10
Payer: COMMERCIAL

## 2024-05-10 DIAGNOSIS — I63.411 CEREBROVASCULAR ACCIDENT (CVA) DUE TO EMBOLISM OF RIGHT MIDDLE CEREBRAL ARTERY: ICD-10-CM

## 2024-05-10 DIAGNOSIS — Z95.4 HISTORY OF AORTIC VALVE REPLACEMENT WITH METALLIC VALVE: Primary | ICD-10-CM

## 2024-05-10 LAB — INR PPP: 3.5

## 2024-05-15 DIAGNOSIS — E11.22 TYPE 2 DIABETES MELLITUS WITH STAGE 3B CHRONIC KIDNEY DISEASE, WITH LONG-TERM CURRENT USE OF INSULIN: Primary | ICD-10-CM

## 2024-05-15 DIAGNOSIS — N18.32 TYPE 2 DIABETES MELLITUS WITH STAGE 3B CHRONIC KIDNEY DISEASE, WITH LONG-TERM CURRENT USE OF INSULIN: Primary | ICD-10-CM

## 2024-05-15 DIAGNOSIS — Z79.4 TYPE 2 DIABETES MELLITUS WITH STAGE 3B CHRONIC KIDNEY DISEASE, WITH LONG-TERM CURRENT USE OF INSULIN: Primary | ICD-10-CM

## 2024-05-17 ENCOUNTER — ANTICOAGULATION VISIT (OUTPATIENT)
Dept: PHARMACY | Facility: HOSPITAL | Age: 89
End: 2024-05-17
Payer: COMMERCIAL

## 2024-05-17 DIAGNOSIS — I63.411 CEREBROVASCULAR ACCIDENT (CVA) DUE TO EMBOLISM OF RIGHT MIDDLE CEREBRAL ARTERY: ICD-10-CM

## 2024-05-17 DIAGNOSIS — Z95.4 HISTORY OF AORTIC VALVE REPLACEMENT WITH METALLIC VALVE: Primary | ICD-10-CM

## 2024-05-17 LAB — INR PPP: 3.5

## 2024-05-17 NOTE — PROGRESS NOTES
Anticoagulation Clinic Progress Note    Anticoagulation Summary  As of 5/17/2024      INR goal:  3.0-3.5   TTR:  66.5% (5.3 y)   INR used for dosing:  3.50 (5/17/2024)   Warfarin maintenance plan:  2.5 mg every Mon; 5 mg all other days   Weekly warfarin total:  32.5 mg   No change documented:  Michelle Piña, ROSALINDA   Plan last modified:  Vasquez Niño, PharmD (5/3/2024)   Next INR check:  5/24/2024   Priority:  High   Target end date:  Indefinite    Indications    History of aortic valve replacement with metallic valve [Z95.4]  Atrial fibrillation [I48.91]  Cerebrovascular accident (CVA) due to 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]                 Anticoagulation Episode Summary       INR check location:      Preferred lab:      Send INR reminders to:  [facility]    Comments:  **Home testing training 3/3/23** *CALL EVERY TIME* New INR goal 3 - 3.5 (see 1/2020 hospitalization for CVA)          Anticoagulation Care Providers       Provider Role Specialty Phone number    Griffin Fried MD Referring Cardiology [phone]            Clinic Interview:  No pertinent clinical findings have been reported.    INR History:      4/26/2024     8:56 AM 5/3/2024    12:00 AM 5/3/2024     8:00 AM 5/10/2024    12:00 AM 5/10/2024     8:41 AM 5/17/2024    12:00 AM 5/17/2024     9:03 AM   Anticoagulation Monitoring   INR 3.90  2.90  3.50  3.50   INR Date 4/26/2024  5/3/2024  5/10/2024  5/17/2024   INR Goal 3.0-3.5  3.0-3.5  3.0-3.5  3.0-3.5   Trend Down  Up  Same  Same   Last Week Total 30 mg  30 mg  32.5 mg  32.5 mg   Next Week Total 27.5 mg  32.5 mg  32.5 mg  32.5 mg   Sun 5 mg  5 mg  5 mg  5 mg   Mon 2.5 mg  2.5 mg  2.5 mg  2.5 mg   Tue 5 mg  5 mg  5 mg  5 mg   Wed 5 mg  5 mg  5 mg  5 mg   Thu 5 mg  5 mg  5 mg  5 mg   Fri 2.5 mg  5 mg  5 mg  5 mg   Sat 2.5 mg (4/27)  5 mg  5 mg  5 mg   Historical INR  2.90      3.50      3.50        Visit Report  Report Report           This result is from an  external source.       Plan:  1. INR is therapeutic today- see above in Anticoagulation Summary.    LAURA Sequeira to continue their warfarin regimen- see above in Anticoagulation Summary.  2. Follow up in 1 week  3.  They have been instructed to call if any changes in medications, doses, concerns, etc. Patient expresses understanding and has no further questions at this time.    Michelle Piña, [facility]

## 2024-05-24 ENCOUNTER — ANTICOAGULATION VISIT (OUTPATIENT)
Dept: PHARMACY | Facility: HOSPITAL | Age: 89
End: 2024-05-24
Payer: COMMERCIAL

## 2024-05-24 DIAGNOSIS — I63.411 CEREBROVASCULAR ACCIDENT (CVA) DUE TO EMBOLISM OF RIGHT MIDDLE CEREBRAL ARTERY: ICD-10-CM

## 2024-05-24 DIAGNOSIS — Z95.4 HISTORY OF AORTIC VALVE REPLACEMENT WITH METALLIC VALVE: Primary | ICD-10-CM

## 2024-05-24 LAB — INR PPP: 3.5

## 2024-05-24 NOTE — PROGRESS NOTES
Anticoagulation Clinic Progress Note    Anticoagulation Summary  As of 5/24/2024      INR goal:  3.0-3.5   TTR:  66.7% (5.3 y)   INR used for dosing:  3.50 (5/24/2024)   Warfarin maintenance plan:  2.5 mg every Mon; 5 mg all other days   Weekly warfarin total:  32.5 mg   No change documented:  Michelle Piña, ROSALINDA   Plan last modified:  Vasquez Niño, PharmD (5/3/2024)   Next INR check:  5/31/2024   Priority:  High   Target end date:  Indefinite    Indications    History of aortic valve replacement with metallic valve [Z95.4]  Atrial fibrillation [I48.91]  Cerebrovascular accident (CVA) due to 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]                 Anticoagulation Episode Summary       INR check location:      Preferred lab:      Send INR reminders to:  [facility]    Comments:  **Home testing training 3/3/23** *CALL EVERY TIME* New INR goal 3 - 3.5 (see 1/2020 hospitalization for CVA)          Anticoagulation Care Providers       Provider Role Specialty Phone number    Griffin Fried MD Referring Cardiology [phone]            Clinic Interview:  No pertinent clinical findings have been reported.    INR History:      5/3/2024     8:00 AM 5/10/2024    12:00 AM 5/10/2024     8:41 AM 5/17/2024    12:00 AM 5/17/2024     9:03 AM 5/24/2024    12:00 AM 5/24/2024     8:20 AM   Anticoagulation Monitoring   INR 2.90  3.50  3.50  3.50   INR Date 5/3/2024  5/10/2024  5/17/2024  5/24/2024   INR Goal 3.0-3.5  3.0-3.5  3.0-3.5  3.0-3.5   Trend Up  Same  Same  Same   Last Week Total 30 mg  32.5 mg  32.5 mg  32.5 mg   Next Week Total 32.5 mg  32.5 mg  32.5 mg  32.5 mg   Sun 5 mg  5 mg  5 mg  5 mg   Mon 2.5 mg  2.5 mg  2.5 mg  2.5 mg   Tue 5 mg  5 mg  5 mg  5 mg   Wed 5 mg  5 mg  5 mg  5 mg   Thu 5 mg  5 mg  5 mg  5 mg   Fri 5 mg  5 mg  5 mg  5 mg   Sat 5 mg  5 mg  5 mg  5 mg   Historical INR  3.50      3.50      3.50        Visit Report Report             This result is from an external source.        Plan:  1. INR is therapeutic today- see above in Anticoagulation Summary.    LAURA Sequeira to continue their warfarin regimen- see above in Anticoagulation Summary.  2. Follow up in 1 week  3. They have been instructed to call if any changes in medications, doses, concerns, etc. Patient expresses understanding and has no further questions at this time.    Michelle Piña, [facility]

## 2024-05-31 ENCOUNTER — ANTICOAGULATION VISIT (OUTPATIENT)
Dept: PHARMACY | Facility: HOSPITAL | Age: 89
End: 2024-05-31
Payer: COMMERCIAL

## 2024-05-31 DIAGNOSIS — Z95.4 HISTORY OF AORTIC VALVE REPLACEMENT WITH METALLIC VALVE: Primary | ICD-10-CM

## 2024-05-31 DIAGNOSIS — I63.411 CEREBROVASCULAR ACCIDENT (CVA) DUE TO EMBOLISM OF RIGHT MIDDLE CEREBRAL ARTERY: ICD-10-CM

## 2024-05-31 LAB — INR PPP: 3.8

## 2024-05-31 PROCEDURE — G0249 PROVIDE INR TEST MATER/EQUIP: HCPCS

## 2024-05-31 NOTE — PROGRESS NOTES
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]                 Anticoagulation Episode Summary       INR check location:      Preferred lab:      Send INR reminders to:  [facility]    Comments:  **Home testing training 3/3/23** *CALL EVERY TIME* New INR goal 3 - 3.5 (see 1/2020 hospitalization for CVA)          Anticoagulation Care Providers       Provider Role Specialty Phone number    Griffin Fried MD Referring Cardiology [phone]            Clinic Interview:  Patient Findings     Positives:  Change in alcohol use    Negatives:  Signs/symptoms of thrombosis, Signs/symptoms of bleeding,   Laboratory test error suspected, Change in health, Change in activity,   Upcoming invasive procedure, Emergency department visit, Upcoming dental   procedure, Missed doses, Extra doses, Change in medications, Change in   diet/appetite, Hospital admission, Bruising, Other complaints    Comments:  Gladys reports patient had a small amount of wine last night; no   other changes.      Clinical Outcomes     Negatives:  Major bleeding event, Thromboembolic event,   Anticoagulation-related hospital admission, Anticoagulation-related ED   visit, Anticoagulation-related fatality    Comments:  Gladys reports patient had a small amount of wine last night; no   other changes.        INR History:      5/10/2024     8:41 AM 5/17/2024    12:00 AM 5/17/2024     9:03 AM  5/24/2024    12:00 AM 5/24/2024     8:20 AM 5/31/2024    12:00 AM 5/31/2024     8:15 AM   Anticoagulation Monitoring   INR 3.50  3.50  3.50  3.80   INR Date 5/10/2024  5/17/2024  5/24/2024  5/31/2024   INR Goal 3.0-3.5  3.0-3.5  3.0-3.5  3.0-3.5   Trend Same  Same  Same  Same   Last Week Total 32.5 mg  32.5 mg  32.5 mg  32.5 mg   Next Week Total 32.5 mg  32.5 mg  32.5 mg  30 mg   Sun 5 mg  5 mg  5 mg  5 mg   Mon 2.5 mg  2.5 mg  2.5 mg  2.5 mg   Tue 5 mg  5 mg  5 mg  5 mg   Wed 5 mg  5 mg  5 mg  5 mg   Thu 5 mg  5 mg  5 mg  5 mg   Fri 5 mg  5 mg  5 mg  2.5 mg (5/31)   Sat 5 mg  5 mg  5 mg  5 mg   Historical INR  3.50      3.50      3.80            This result is from an external source.       Plan:  1. INR is Supratherapeutic today- see above in Anticoagulation Summary.   Will instruct LAURA BRENNAN Sequeira to Change their warfarin regimen- see above in Anticoagulation Summary.  2. Follow up in 1 weeks  3. They have been instructed to call if any changes in medications, doses, concerns, etc. Patient expresses understanding and has no further questions at this time.    Carmen El, PharmD

## 2024-06-07 ENCOUNTER — ANTICOAGULATION VISIT (OUTPATIENT)
Dept: PHARMACY | Facility: HOSPITAL | Age: 89
End: 2024-06-07
Payer: COMMERCIAL

## 2024-06-07 DIAGNOSIS — Z95.4 HISTORY OF AORTIC VALVE REPLACEMENT WITH METALLIC VALVE: Primary | ICD-10-CM

## 2024-06-07 DIAGNOSIS — I63.411 CEREBROVASCULAR ACCIDENT (CVA) DUE TO EMBOLISM OF RIGHT MIDDLE CEREBRAL ARTERY: ICD-10-CM

## 2024-06-07 LAB — INR PPP: 3.1

## 2024-06-07 NOTE — PROGRESS NOTES
Anticoagulation Clinic Progress Note    Anticoagulation Summary  As of 6/7/2024      INR goal:  3.0-3.5   TTR:  66.4% (5.4 y)   INR used for dosing:  3.10 (6/7/2024)   Warfarin maintenance plan:  2.5 mg every Mon; 5 mg all other days   Weekly warfarin total:  32.5 mg   No change documented:  Carmen El, PharmD   Plan last modified:  Vasquez Niño PharmD (5/3/2024)   Next INR check:  6/14/2024   Priority:  High   Target end date:  Indefinite    Indications    History of aortic valve replacement with metallic valve [Z95.4]  Atrial fibrillation [I48.91]  Cerebrovascular accident (CVA) due to 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]                 Anticoagulation Episode Summary       INR check location:      Preferred lab:      Send INR reminders to:  [facility]    Comments:  **Home testing training 3/3/23** *CALL EVERY TIME* New INR goal 3 - 3.5 (see 1/2020 hospitalization for CVA)          Anticoagulation Care Providers       Provider Role Specialty Phone number    Griffin Fried MD Referring Cardiology [phone]            Clinic Interview:  No pertinent clinical findings have been reported.    INR History:      5/17/2024     9:03 AM 5/24/2024    12:00 AM 5/24/2024     8:20 AM 5/31/2024    12:00 AM 5/31/2024     8:15 AM 6/7/2024    12:00 AM 6/7/2024     8:55 AM   Anticoagulation Monitoring   INR 3.50  3.50  3.80  3.10   INR Date 5/17/2024 5/24/2024 5/31/2024 6/7/2024   INR Goal 3.0-3.5  3.0-3.5  3.0-3.5  3.0-3.5   Trend Same  Same  Same  Same   Last Week Total 32.5 mg  32.5 mg  32.5 mg  30 mg   Next Week Total 32.5 mg  32.5 mg  30 mg  32.5 mg   Sun 5 mg  5 mg  5 mg  5 mg   Mon 2.5 mg  2.5 mg  2.5 mg  2.5 mg   Tue 5 mg  5 mg  5 mg  5 mg   Wed 5 mg  5 mg  5 mg  5 mg   Thu 5 mg  5 mg  5 mg  5 mg   Fri 5 mg  5 mg  2.5 mg (5/31)  5 mg   Sat 5 mg  5 mg  5 mg  5 mg   Historical INR  3.50      3.80      3.10            This result is from an external source.       Plan:  1. INR  is therapeutic today- see above in Anticoagulation Summary.    LAURA Sequeira to continue their warfarin regimen- see above in Anticoagulation Summary.  2. Follow up in 1 week  3.  They have been instructed to call if any changes in medications, doses, concerns, etc. Patient expresses understanding and has no further questions at this time.    Carmen El, PharmD

## 2024-06-14 ENCOUNTER — ANTICOAGULATION VISIT (OUTPATIENT)
Dept: PHARMACY | Facility: HOSPITAL | Age: 89
End: 2024-06-14
Payer: COMMERCIAL

## 2024-06-14 DIAGNOSIS — N18.32 TYPE 2 DIABETES MELLITUS WITH STAGE 3B CHRONIC KIDNEY DISEASE, WITH LONG-TERM CURRENT USE OF INSULIN: Primary | ICD-10-CM

## 2024-06-14 DIAGNOSIS — E11.22 TYPE 2 DIABETES MELLITUS WITH STAGE 3B CHRONIC KIDNEY DISEASE, WITH LONG-TERM CURRENT USE OF INSULIN: Primary | ICD-10-CM

## 2024-06-14 DIAGNOSIS — I63.411 CEREBROVASCULAR ACCIDENT (CVA) DUE TO EMBOLISM OF RIGHT MIDDLE CEREBRAL ARTERY: ICD-10-CM

## 2024-06-14 DIAGNOSIS — Z95.4 HISTORY OF AORTIC VALVE REPLACEMENT WITH METALLIC VALVE: Primary | ICD-10-CM

## 2024-06-14 DIAGNOSIS — Z79.4 TYPE 2 DIABETES MELLITUS WITH STAGE 3B CHRONIC KIDNEY DISEASE, WITH LONG-TERM CURRENT USE OF INSULIN: Primary | ICD-10-CM

## 2024-06-14 LAB — INR PPP: 3.5

## 2024-06-14 NOTE — PROGRESS NOTES
Anticoagulation Clinic Progress Note    Anticoagulation Summary  As of 6/14/2024      INR goal:  3.0-3.5   TTR:  66.5% (5.4 y)   INR used for dosing:  3.50 (6/14/2024)   Warfarin maintenance plan:  2.5 mg every Mon; 5 mg all other days   Weekly warfarin total:  32.5 mg   No change documented:  Carmen El, PharmD   Plan last modified:  Vasquez Niño PharmD (5/3/2024)   Next INR check:  6/21/2024   Priority:  High   Target end date:  Indefinite    Indications    History of aortic valve replacement with metallic valve [Z95.4]  Atrial fibrillation [I48.91]  Cerebrovascular accident (CVA) due to 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    INR History:      5/24/2024     8:20 AM 5/31/2024    12:00 AM 5/31/2024     8:15 AM 6/7/2024    12:00 AM 6/7/2024     8:55 AM 6/14/2024    12:00 AM 6/14/2024     8:37 AM   Anticoagulation Monitoring   INR 3.50  3.80  3.10  3.50   INR Date 5/24/2024 5/31/2024 6/7/2024 6/14/2024   INR Goal 3.0-3.5  3.0-3.5  3.0-3.5  3.0-3.5   Trend Same  Same  Same  Same   Last Week Total 32.5 mg  32.5 mg  30 mg  32.5 mg   Next Week Total 32.5 mg  30 mg  32.5 mg  32.5 mg   Sun 5 mg  5 mg  5 mg  5 mg   Mon 2.5 mg  2.5 mg  2.5 mg  2.5 mg   Tue 5 mg  5 mg  5 mg  5 mg   Wed 5 mg  5 mg  5 mg  5 mg   Thu 5 mg  5 mg  5 mg  5 mg   Fri 5 mg  2.5 mg (5/31)  5 mg  5 mg   Sat 5 mg  5 mg  5 mg  5 mg   Historical INR  3.80      3.10      3.50            This result is from an external source.       Plan:  1.  INR is therapeutic today- see above in Anticoagulation Summary.    LAURA Sequeira to continue their warfarin regimen- see above in Anticoagulation Summary.  2. Follow up in 1 week  3. They have been instructed to call if any changes in medications, doses, concerns, etc. Patient expresses understanding and has no further questions at this time.    Carmen El, PharmD

## 2024-06-15 DIAGNOSIS — I48.20 CHRONIC ATRIAL FIBRILLATION: ICD-10-CM

## 2024-06-15 LAB
BUN SERPL-MCNC: 39 MG/DL (ref 8–23)
BUN/CREAT SERPL: 18.1 (ref 7–25)
CALCIUM SERPL-MCNC: 8.8 MG/DL (ref 8.6–10.5)
CHLORIDE SERPL-SCNC: 104 MMOL/L (ref 98–107)
CO2 SERPL-SCNC: 24.6 MMOL/L (ref 22–29)
CREAT SERPL-MCNC: 2.15 MG/DL (ref 0.76–1.27)
EGFRCR SERPLBLD CKD-EPI 2021: 29.3 ML/MIN/1.73
GLUCOSE SERPL-MCNC: 203 MG/DL (ref 65–99)
POTASSIUM SERPL-SCNC: 5.3 MMOL/L (ref 3.5–5.2)
SODIUM SERPL-SCNC: 141 MMOL/L (ref 136–145)

## 2024-06-17 ENCOUNTER — TELEPHONE (OUTPATIENT)
Dept: CARDIOLOGY | Facility: CLINIC | Age: 89
End: 2024-06-17
Payer: COMMERCIAL

## 2024-06-17 DIAGNOSIS — N18.32 TYPE 2 DIABETES MELLITUS WITH STAGE 3B CHRONIC KIDNEY DISEASE, WITH LONG-TERM CURRENT USE OF INSULIN: Primary | ICD-10-CM

## 2024-06-17 DIAGNOSIS — E11.22 TYPE 2 DIABETES MELLITUS WITH STAGE 3B CHRONIC KIDNEY DISEASE, WITH LONG-TERM CURRENT USE OF INSULIN: Primary | ICD-10-CM

## 2024-06-17 DIAGNOSIS — Z79.4 TYPE 2 DIABETES MELLITUS WITH STAGE 3B CHRONIC KIDNEY DISEASE, WITH LONG-TERM CURRENT USE OF INSULIN: Primary | ICD-10-CM

## 2024-06-17 RX ORDER — DIGOXIN 125 MCG
62.5 TABLET ORAL DAILY
Qty: 45 TABLET | Refills: 1 | Status: SHIPPED | OUTPATIENT
Start: 2024-06-17

## 2024-06-17 NOTE — TELEPHONE ENCOUNTER
Dr. Menendez ([phone]) returned call.    Reviewed Amirah's message and recommendations with Dr. Menendez.  Dr. Menendez is agreeable with the recommendations and asked that he be notified if patient's INR is not between 2.0-2.5 prior to patient's procedure.    Procedure is scheduled for 6/28 at 12 pm.    Please let me know if there is anything else you would like me to do for this patient.    Thank you,  Ave WELLER RN  [place] [facility] [place]   12:21 EDT

## 2024-06-17 NOTE — TELEPHONE ENCOUNTER
Dr. Emigdio Menendez is calling because this patient is needing to have a tooth extracted on 6/28. He is unsure if there will be much bleeding, but since the patient is on warfarin he is wanting to know if the patient needs to come off his warfarin before or if he needs to stay on it.     Nereida Cartagena RN  Triage [facility] [place]

## 2024-06-17 NOTE — TELEPHONE ENCOUNTER
06/17/24  09:24 EDT    He is on his warfarin for a mechanical aortic valve.  It would be best if patient could remain on this.  His current goal INR is 3.0-3.5.  I think that it would be reasonable to decrease his goal to 2.0-2.5 during dental procedure to reduce bleeding and then go back to his current goal.  Please let me know if this is okay with the dentist, if it is then I will CC the anticoagulation monitoring clinic.      LIZA Newell  [facility]

## 2024-06-17 NOTE — TELEPHONE ENCOUNTER
06/17/24  12:31 EDT    Patient is in need of a dental extraction.  I do not want him to completely stop warfarin for a subtherapeutic INR but would like to transition goal from 2.0-2.5 to reduce his bleeding risk.  Can you please make temporary adjustments for this and notify Dr. Menendez as documented below when INR should be at goal for scheduling arrangements?      LIZA Newell  [facility]

## 2024-06-17 NOTE — TELEPHONE ENCOUNTER
Called and left VM. Will continue to try to reach Dr. Menendez. HUB transfer call to triage.     Nereida Cartagena RN  Triage [facility] [place]

## 2024-06-18 ENCOUNTER — ANTICOAGULATION VISIT (OUTPATIENT)
Dept: PHARMACY | Facility: HOSPITAL | Age: 89
End: 2024-06-18
Payer: COMMERCIAL

## 2024-06-18 DIAGNOSIS — I63.411 CEREBROVASCULAR ACCIDENT (CVA) DUE TO EMBOLISM OF RIGHT MIDDLE CEREBRAL ARTERY: ICD-10-CM

## 2024-06-18 DIAGNOSIS — Z95.4 HISTORY OF AORTIC VALVE REPLACEMENT WITH METALLIC VALVE: Primary | ICD-10-CM

## 2024-06-18 LAB — INR PPP: 4

## 2024-06-18 NOTE — PROGRESS NOTES
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]                 Anticoagulation Episode Summary       INR check location:      Preferred lab:      Send INR reminders to:  [facility]    Comments:  **Home testing training 3/3/23** *CALL EVERY TIME* New INR goal 3 - 3.5 (see 2020 hospitalization for CVA)          Anticoagulation Care Providers       Provider Role Specialty Phone number    Griffin Fried MD Referring Cardiology [phone]            Clinic Interview:  Patient Findings     Positives:  Upcoming dental procedure    Negatives:  Signs/symptoms of thrombosis, Signs/symptoms of bleeding,   Laboratory test error suspected, Change in health, Change in alcohol use,   Change in activity, Upcoming invasive procedure, Emergency department   visit, Missed doses, Extra doses, Change in medications, Change in   diet/appetite, Hospital admission, Bruising, Other complaints    Comments:  Patient is having multiple tooth extractions next .    In consideration of patient's history, dentist was agreeable to reducing   INR goal prior to procedure in lieu of holding warfarin altogether.  [facility] [place]   approved this plan so will decrease warfarin dosing over next 10 days to   achieve goal of 2-2.5.      Clinical Outcomes     Negatives:  Major bleeding event, Thromboembolic event,   Anticoagulation-related hospital admission, Anticoagulation-related  ED   visit, Anticoagulation-related fatality    Comments:  Patient is having multiple tooth extractions next Friday 6/28.    In consideration of patient's history, dentist was agreeable to reducing   INR goal prior to procedure in lieu of holding warfarin altogether.  [facility] [place] so will decrease warfarin dosing over next 10 days to   achieve goal of 2-2.5.        INR History:      5/31/2024     8:15 AM 6/7/2024    12:00 AM 6/7/2024     8:55 AM 6/14/2024    12:00 AM 6/14/2024     8:37 AM 6/18/2024    12:00 AM 6/18/2024     8:32 AM   Anticoagulation Monitoring   INR 3.80  3.10  3.50  4.00   INR Date 5/31/2024 6/7/2024 6/14/2024 6/18/2024   INR Goal 3.0-3.5  3.0-3.5  3.0-3.5  3.0-3.5   Trend Same  Same  Same  Same   Last Week Total 32.5 mg  30 mg  32.5 mg  32.5 mg   Next Week Total 30 mg  32.5 mg  32.5 mg  25 mg   Sun 5 mg  5 mg  5 mg  -   Mon 2.5 mg  2.5 mg  2.5 mg  -   Tue 5 mg  5 mg  5 mg  2.5 mg (6/18)   Wed 5 mg  5 mg  5 mg  2.5 mg (6/19)   Thu 5 mg  5 mg  5 mg  5 mg   Fri 2.5 mg (5/31)  5 mg  5 mg  -   Sat 5 mg  5 mg  5 mg  -   Historical INR  3.10      3.50      4.00            This result is from an external source.       Plan:  1. INR is Supratherapeutic today- see above in Anticoagulation Summary.   Will instruct LAURA BRENNAN Sequeira to Change their warfarin regimen- see above in Anticoagulation Summary.  Take 2.5 mg today, then reduce dose this week to 2.5 mg on Monday, Wednesday and Friday.   2. Follow up in 4 days   3. They have been instructed to call if any changes in medications, doses, concerns, etc. Patient expresses understanding and has no further questions at this time.    Carmen El, PharmD   [facility] (Discharge Huddle; Vulnerable Patient)

## 2024-06-21 ENCOUNTER — ANTICOAGULATION VISIT (OUTPATIENT)
Dept: PHARMACY | Facility: HOSPITAL | Age: 89
End: 2024-06-21
Payer: COMMERCIAL

## 2024-06-21 DIAGNOSIS — I63.411 CEREBROVASCULAR ACCIDENT (CVA) DUE TO EMBOLISM OF RIGHT MIDDLE CEREBRAL ARTERY: ICD-10-CM

## 2024-06-21 DIAGNOSIS — Z95.4 HISTORY OF AORTIC VALVE REPLACEMENT WITH METALLIC VALVE: Primary | ICD-10-CM

## 2024-06-21 LAB — INR PPP: 3.6

## 2024-06-21 NOTE — PROGRESS NOTES
Anticoagulation Clinic Progress Note    Anticoagulation Summary  As of 6/21/2024      INR goal:  3.0-3.5   TTR:  66.3% (5.4 y)   INR used for dosing:  3.60 (6/21/2024)   Warfarin maintenance plan:  2.5 mg every Mon; 5 mg all other days   Weekly warfarin total:  32.5 mg   No change documented:  Vasquez Niño, Pedro   Plan last modified:  Vasquez Niño PharmD (5/3/2024)   Next INR check:  6/25/2024   Priority:  High   Target end date:  Indefinite    Indications    History of aortic valve replacement with metallic valve [Z95.4]  Atrial fibrillation [I48.91]  Cerebrovascular accident (CVA) due to 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]                 Anticoagulation Episode Summary       INR check location:      Preferred lab:      Send INR reminders to:  [facility] TEST POOL    Comments:  **Home testing training 3/3/23** *CALL EVERY TIME* New INR goal 3 - 3.5 (see 1/2020 hospitalization for CVA)          Anticoagulation Care Providers       Provider Role Specialty Phone number    Griffin Fried MD Referring Cardiology [phone]            Clinic Interview:  Patient Findings     Positives:  Upcoming dental procedure    Negatives:  Signs/symptoms of thrombosis, Signs/symptoms of bleeding,   Laboratory test error suspected, Change in health, Change in alcohol use,   Change in activity, Upcoming invasive procedure, Emergency department   visit, Missed doses, Extra doses, Change in medications, Change in   diet/appetite, Hospital admission, Bruising, Other complaints    Comments:  Dental work 6/28/24; dentist requiring INR of 2.0-2.5 on day   of procedure (see 6/17/24 telephone note).       Clinical Outcomes     Negatives:  Major bleeding event, Thromboembolic event,   Anticoagulation-related hospital admission, Anticoagulation-related ED   visit, Anticoagulation-related fatality    Comments:  Dental work 6/28/24; dentist requiring INR of 2.0-2.5 on day   of procedure (see 6/17/24 telephone  note).         INR History:      6/7/2024     8:55 AM 6/14/2024    12:00 AM 6/14/2024     8:37 AM 6/18/2024    12:00 AM 6/18/2024     8:32 AM 6/21/2024    12:00 AM 6/21/2024     8:29 AM   Anticoagulation Monitoring   INR 3.10  3.50  4.00  3.60   INR Date 6/7/2024 6/14/2024 6/18/2024 6/21/2024   INR Goal 3.0-3.5  3.0-3.5  3.0-3.5  3.0-3.5   Trend Same  Same  Same  Same   Last Week Total 30 mg  32.5 mg  32.5 mg  27.5 mg   Next Week Total 32.5 mg  32.5 mg  25 mg  30 mg   Sun 5 mg  5 mg  -  5 mg   Mon 2.5 mg  2.5 mg  -  2.5 mg   Tue 5 mg  5 mg  2.5 mg (6/18)  -   Wed 5 mg  5 mg  2.5 mg (6/19)  -   Thu 5 mg  5 mg  5 mg  -   Fri 5 mg  5 mg  -  2.5 mg (6/21)   Sat 5 mg  5 mg  -  5 mg   Historical INR  3.50      4.00      3.60            This result is from an external source.       Plan:  1. INR is Supratherapeutic today- see above in Anticoagulation Summary.   Will instruct LAURA BRENNAN Sequeira to Change their warfarin regimen (2.5 mg today, then resume 2.5 mg Mon, 5 mg all other days) - see above in Anticoagulation Summary.  2. Follow up in 4 days to provide dose reduction instructions to achieve INR 2.0-2.5 on 6/28/24.   3. They have been instructed to call if any changes in medications, doses, concerns, etc. Patient expresses understanding and has no further questions at this time.    Vasquez Niño, PharmD

## 2024-06-25 ENCOUNTER — ANTICOAGULATION VISIT (OUTPATIENT)
Dept: PHARMACY | Facility: HOSPITAL | Age: 89
End: 2024-06-25
Payer: COMMERCIAL

## 2024-06-25 DIAGNOSIS — Z95.4 HISTORY OF AORTIC VALVE REPLACEMENT WITH METALLIC VALVE: Primary | ICD-10-CM

## 2024-06-25 DIAGNOSIS — I63.411 CEREBROVASCULAR ACCIDENT (CVA) DUE TO EMBOLISM OF RIGHT MIDDLE CEREBRAL ARTERY: ICD-10-CM

## 2024-06-25 LAB — INR PPP: 2.9

## 2024-06-25 NOTE — PROGRESS NOTES
Anticoagulation Clinic Progress Note    Anticoagulation Summary  As of 2024      INR goal:  3.0-3.5   TTR:  66.3% (5.4 y)   INR used for dosin.90 (2024)   Warfarin maintenance plan:  2.5 mg every Mon; 5 mg all other days   Weekly warfarin total:  32.5 mg   Plan last modified:  Vasquez Niño, PharmD (5/3/2024)   Next INR check:  2024   Priority:  High   Target end date:  Indefinite    Indications    History of aortic valve replacement with metallic valve [Z95.4]  Atrial fibrillation [I48.91]  Cerebrovascular accident (CVA) due to 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]                 Anticoagulation Episode Summary       INR check location:      Preferred lab:      Send INR reminders to:  [facility] TEST PO[facility]    Comments:  **Home testing training 3/3/23** *CALL EVERY TIME* New INR goal 3 - 3.5 (see 2020 hospitalization for CVA)          Anticoagulation Care Providers       Provider Role Specialty Phone number    Griffin Fried MD Referring Cardiology [phone]            Clinic Interview:  Patient Findings     Positives:  Upcoming dental procedure    Negatives:  Signs/symptoms of thrombosis, Signs/symptoms of bleeding,   Laboratory test error suspected, Change in health, Change in alcohol use,   Change in activity, Upcoming invasive procedure, Emergency department   visit, Missed doses, Extra doses, Change in medications, Change in   diet/appetite, Hospital admission, Bruising, Other complaints    Comments:  Dental work 24; dentist requiring INR of 2.0-2.5 on day   of procedure (see 24 telephone note).       Clinical Outcomes     Negatives:  Major bleeding event, Thromboembolic event,   Anticoagulation-related hospital admission, Anticoagulation-related ED   visit, Anticoagulation-related fatality    Comments:  Dental work 24; dentist requiring INR of 2.0-2.5 on day   of procedure (see 24 telephone note).         INR History:      2024      8:37 AM 6/18/2024    12:00 AM 6/18/2024     8:32 AM 6/21/2024    12:00 AM 6/21/2024     8:29 AM 6/25/2024    12:00 AM 6/25/2024     7:57 AM   Anticoagulation Monitoring   INR 3.50  4.00  3.60  2.90   INR Date 6/14/2024 6/18/2024 6/21/2024 6/25/2024   INR Goal 3.0-3.5  3.0-3.5  3.0-3.5  3.0-3.5   Trend Same  Same  Same  Same   Last Week Total 32.5 mg  32.5 mg  27.5 mg  25 mg   Next Week Total 32.5 mg  25 mg  30 mg  27.5 mg   Sun 5 mg  -  5 mg  -   Mon 2.5 mg  -  2.5 mg  -   Tue 5 mg  2.5 mg (6/18)  -  2.5 mg (6/25)   Wed 5 mg  2.5 mg (6/19)  -  2.5 mg (6/26)   Thu 5 mg  5 mg  -  5 mg   Fri 5 mg  -  2.5 mg (6/21)  -   Sat 5 mg  -  5 mg  -   Historical INR  4.00      3.60      2.90            This result is from an external source.       Plan:  1. INR is Subtherapeutic today- see above in Anticoagulation Summary.   Will instruct LAURA Sequeira to Change their warfarin regimen (2.5 mg today/tomorrow, 5 mg Thurs) to target INR of 2.0-2.5 on the day of his dental procedure - see above in Anticoagulation Summary.  2. Follow up in 3 days.   3. They have been instructed to call if any changes in medications, doses, concerns, etc. Patient expresses understanding and has no further questions at this time.    Vasquez Niño, PharmD

## 2024-06-28 ENCOUNTER — ANTICOAGULATION VISIT (OUTPATIENT)
Dept: PHARMACY | Facility: HOSPITAL | Age: 89
End: 2024-06-28
Payer: COMMERCIAL

## 2024-06-28 DIAGNOSIS — I63.411 CEREBROVASCULAR ACCIDENT (CVA) DUE TO EMBOLISM OF RIGHT MIDDLE CEREBRAL ARTERY: ICD-10-CM

## 2024-06-28 DIAGNOSIS — Z95.4 HISTORY OF AORTIC VALVE REPLACEMENT WITH METALLIC VALVE: Primary | ICD-10-CM

## 2024-06-28 DIAGNOSIS — I48.21 PERMANENT ATRIAL FIBRILLATION: ICD-10-CM

## 2024-06-28 LAB — INR PPP: 2

## 2024-06-28 PROCEDURE — G0249 PROVIDE INR TEST MATER/EQUIP: HCPCS

## 2024-06-28 NOTE — PROGRESS NOTES
Anticoagulation Clinic Progress Note    Anticoagulation Summary  As of 2024      INR goal:  3.0-3.5   TTR:  66.2% (5.4 y)   INR used for dosin.00 (2024)   Warfarin maintenance plan:  2.5 mg every Mon; 5 mg all other days   Weekly warfarin total:  32.5 mg   Plan last modified:  Vasquez Niño, PharmD (5/3/2024)   Next INR check:  2024   Priority:  High   Target end date:  Indefinite    Indications    History of aortic valve replacement with metallic valve [Z95.4]  Atrial fibrillation [I48.91]  Cerebrovascular accident (CVA) due to 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]                 Anticoagulation Episode Summary       INR check location:      Preferred lab:      Send INR reminders to:  [facility]    Comments:  **Home testing training 3/3/23** *CALL EVERY TIME* New INR goal 3 - 3.5 (see 2020 hospitalization for CVA)          Anticoagulation Care Providers       Provider Role Specialty Phone number    Griffin Fried MD Referring Cardiology [phone]            Clinic Interview:  Patient Findings     Negatives:  Signs/symptoms of thrombosis, Signs/symptoms of bleeding,   Laboratory test error suspected, Change in health, Change in alcohol use,   Change in activity, Upcoming invasive procedure, Emergency department   visit, Upcoming dental procedure, Missed doses, Extra doses, Change in   medications, Change in diet/appetite, Hospital admission, Bruising, Other   complaints      Clinical Outcomes     Negatives:  Major bleeding event, Thromboembolic event,   Anticoagulation-related hospital admission, Anticoagulation-related ED   visit, Anticoagulation-related fatality        INR History:      2024     8:32 AM 2024    12:00 AM 2024     8:29 AM 2024    12:00 AM 2024     7:57 AM 2024    12:00 AM 2024     8:15 AM   Anticoagulation Monitoring   INR 4.00  3.60  2.90  2.00   INR Date 2024   INR  Goal 3.0-3.5  3.0-3.5  3.0-3.5  3.0-3.5   Trend Same  Same  Same  Same   Last Week Total 32.5 mg  27.5 mg  25 mg  25 mg   Next Week Total 25 mg  30 mg  27.5 mg  37.5 mg   Sun -  5 mg  -  5 mg   Mon -  2.5 mg  -  5 mg (7/1)   Tue 2.5 mg (6/18)  -  2.5 mg (6/25)  5 mg   Wed 2.5 mg (6/19)  -  2.5 mg (6/26)  5 mg   Thu 5 mg  -  5 mg  5 mg   Fri -  2.5 mg (6/21)  -  5 mg   Sat -  5 mg  -  7.5 mg (6/29)   Historical INR  3.60      2.90      2.00            This result is from an external source.       Plan:  1. INR is Subtherapeutic today- see above in Anticoagulation Summary.   Will instruct LAURA Sequeira to Change their warfarin regimen- see above in Anticoagulation Summary.  Dental procedure today (6/28) and INR requested to be 2-2.5; resume 5 mg today, 7.5 mg tomorrow then 5 mg daily.   2. Follow up in 1 weeks  3. They have been instructed to call if any changes in medications, doses, concerns, etc. Patient expresses understanding and has no further questions at this time.    Carmen El, PharmD

## 2024-07-05 ENCOUNTER — ANTICOAGULATION VISIT (OUTPATIENT)
Dept: PHARMACY | Facility: HOSPITAL | Age: 89
End: 2024-07-05
Payer: COMMERCIAL

## 2024-07-05 DIAGNOSIS — I10 ESSENTIAL HYPERTENSION: ICD-10-CM

## 2024-07-05 DIAGNOSIS — I63.411 CEREBROVASCULAR ACCIDENT (CVA) DUE TO EMBOLISM OF RIGHT MIDDLE CEREBRAL ARTERY: ICD-10-CM

## 2024-07-05 DIAGNOSIS — Z95.4 HISTORY OF AORTIC VALVE REPLACEMENT WITH METALLIC VALVE: Primary | ICD-10-CM

## 2024-07-05 LAB — INR PPP: 3.3

## 2024-07-05 NOTE — PROGRESS NOTES
Anticoagulation Clinic Progress Note    Anticoagulation Summary  As of 7/5/2024      INR goal:  3.0-3.5   TTR:  66.0% (5.5 y)   INR used for dosing:  3.30 (7/5/2024)   Warfarin maintenance plan:  2.5 mg every Mon; 5 mg all other days   Weekly warfarin total:  32.5 mg   No change documented:  Carmen El, PharmD   Plan last modified:  Vasquez Niño PharmD (5/3/2024)   Next INR check:  7/12/2024   Priority:  High   Target end date:  Indefinite    Indications    History of aortic valve replacement with metallic valve [Z95.4]  Atrial fibrillation [I48.91]  Cerebrovascular accident (CVA) due to 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    INR History:      6/21/2024     8:29 AM 6/25/2024    12:00 AM 6/25/2024     7:57 AM 6/28/2024    12:00 AM 6/28/2024     8:15 AM 7/5/2024    12:00 AM 7/5/2024     9:02 AM   Anticoagulation Monitoring   INR 3.60  2.90  2.00  3.30   INR Date 6/21/2024 6/25/2024 6/28/2024 7/5/2024   INR Goal 3.0-3.5  3.0-3.5  3.0-3.5  3.0-3.5   Trend Same  Same  Same  Same   Last Week Total 27.5 mg  25 mg  25 mg  37.5 mg   Next Week Total 30 mg  27.5 mg  37.5 mg  32.5 mg   Sun 5 mg  -  5 mg  5 mg   Mon 2.5 mg  -  5 mg (7/1)  2.5 mg   Tue -  2.5 mg (6/25)  5 mg  5 mg   Wed -  2.5 mg (6/26)  5 mg  5 mg   Thu -  5 mg  5 mg  5 mg   Fri 2.5 mg (6/21)  -  5 mg  5 mg   Sat 5 mg  -  7.5 mg (6/29)  5 mg   Historical INR  2.90      2.00      3.30            This result is from an external source.        Plan:  1. INR is therapeutic today- see above in Anticoagulation Summary.    LAURA Sequeira to continue their warfarin regimen- see above in Anticoagulation Summary.  2. Follow up in 1 week  3. Pt has agreed to only be called if INR out of range. They have been instructed to call if any changes in medications, doses, concerns, etc. Patient expresses understanding and has no further questions at this time.    Carmen El, PharmD

## 2024-07-05 NOTE — TELEPHONE ENCOUNTER
Caller: Gladys Sequeira    Relationship: Emergency Contact    Requested Prescriptions:   Requested Prescriptions     Pending Prescriptions Disp Refills    metoprolol succinate XL (TOPROL-XL) 25 MG 24 hr tablet 90 tablet 1     Sig: Take 1 tablet by mouth Daily.      Pharmacy where request should be sent: [id number] [place], [place] - [address] - [phone]  - [phone] FX     Last office visit with prescribing clinician: 5/3/2024   Last telemedicine visit with prescribing clinician: Visit date not found   Next office visit with prescribing clinician: 8/23/2024     Additional details provided by patient: PATIENT HAS LESS THAN 3 PILLS LEFT AND REQUESTED THIS THROUGH THE PHARMACY ON 7/2/24     Does the patient have less than a 3 day supply:  [x] Yes  [] No    Would you like a call back once the refill request has been completed: [] Yes [x] No    If the office needs to give you a call back, can they leave a voicemail: [] Yes [x] No    Felicia Pryor Rep   07/05/24 15:07 EDT

## 2024-07-07 RX ORDER — METOPROLOL SUCCINATE 25 MG/1
25 TABLET, EXTENDED RELEASE ORAL DAILY
Qty: 90 TABLET | Refills: 1 | Status: SHIPPED | OUTPATIENT
Start: 2024-07-07

## 2024-07-12 ENCOUNTER — ANTICOAGULATION VISIT (OUTPATIENT)
Dept: PHARMACY | Facility: HOSPITAL | Age: 89
End: 2024-07-12
Payer: COMMERCIAL

## 2024-07-12 DIAGNOSIS — I63.411 CEREBROVASCULAR ACCIDENT (CVA) DUE TO EMBOLISM OF RIGHT MIDDLE CEREBRAL ARTERY: ICD-10-CM

## 2024-07-12 DIAGNOSIS — Z95.4 HISTORY OF AORTIC VALVE REPLACEMENT WITH METALLIC VALVE: Primary | ICD-10-CM

## 2024-07-12 LAB — INR PPP: 4.6

## 2024-07-12 NOTE — PROGRESS NOTES
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]                 Anticoagulation Episode Summary       INR check location:      Preferred lab:      Send INR reminders to:  [facility]    Comments:  **Home testing training 3/3/23** *CALL EVERY TIME* New INR goal 3 - 3.5 (see 2020 hospitalization for CVA)          Anticoagulation Care Providers       Provider Role Specialty Phone number    Griffin Fried MD Referring Cardiology [phone]            Clinic Interview:  Patient Findings     Negatives:  Signs/symptoms of thrombosis, Signs/symptoms of bleeding,   Laboratory test error suspected, Change in health, Change in alcohol use,   Change in activity, Upcoming invasive procedure, Emergency department   visit, Upcoming dental procedure, Missed doses, Extra doses, Change in   medications, Change in diet/appetite, Hospital admission, Bruising, Other   complaints      Clinical Outcomes     Negatives:  Major bleeding event, Thromboembolic event,   Anticoagulation-related hospital admission, Anticoagulation-related ED   visit, Anticoagulation-related fatality        INR History:      2024     7:57 AM 2024    12:00 AM 2024     8:15 AM 2024    12:00 AM 2024     9:02 AM 2024    12:00 AM 2024     8:38 AM   Anticoagulation Monitoring   INR 2.90  2.00  3.30  4.60   INR Date 2024   INR  Goal 3.0-3.5  3.0-3.5  3.0-3.5  3.0-3.5   Trend Same  Same  Same  Same   Last Week Total 25 mg  25 mg  37.5 mg  32.5 mg   Next Week Total 27.5 mg  37.5 mg  32.5 mg  27.5 mg   Sun -  5 mg  5 mg  5 mg   Mon -  5 mg (7/1)  2.5 mg  2.5 mg   Tue 2.5 mg (6/25)  5 mg  5 mg  5 mg   Wed 2.5 mg (6/26)  5 mg  5 mg  5 mg   Thu 5 mg  5 mg  5 mg  5 mg   Fri -  5 mg  5 mg  Hold (7/12)   Sat -  7.5 mg (6/29)  5 mg  5 mg   Historical INR  2.00      3.30      4.60            This result is from an external source.       Plan:  1. INR is Supratherapeutic today- see above in Anticoagulation Summary.   Will instruct LAURA Sequeira to Change their warfarin regimen- see above in Anticoagulation Summary.  2. Follow up in 1 weeks  3. They have been instructed to call if any changes in medications, doses, concerns, etc. Patient expresses understanding and has no further questions at this time.    Carmen El, PharmD

## 2024-07-19 ENCOUNTER — ANTICOAGULATION VISIT (OUTPATIENT)
Dept: PHARMACY | Facility: HOSPITAL | Age: 89
End: 2024-07-19
Payer: COMMERCIAL

## 2024-07-19 DIAGNOSIS — Z95.4 HISTORY OF AORTIC VALVE REPLACEMENT WITH METALLIC VALVE: Primary | ICD-10-CM

## 2024-07-19 DIAGNOSIS — I63.411 CEREBROVASCULAR ACCIDENT (CVA) DUE TO EMBOLISM OF RIGHT MIDDLE CEREBRAL ARTERY: ICD-10-CM

## 2024-07-19 LAB — INR PPP: 3.5

## 2024-07-19 NOTE — PROGRESS NOTES
Anticoagulation Clinic Progress Note    Anticoagulation Summary  As of 7/19/2024      INR goal:  3.0-3.5   TTR:  65.6% (5.5 y)   INR used for dosing:  3.50 (7/19/2024)   Warfarin maintenance plan:  2.5 mg every Mon; 5 mg all other days   Weekly warfarin total:  32.5 mg   No change documented:  Michelle Piña RPH   Plan last modified:  Vasquez Niño, PharmD (5/3/2024)   Next INR check:  7/26/2024   Priority:  High   Target end date:  Indefinite    Indications    History of aortic valve replacement with metallic valve [Z95.4]  Atrial fibrillation [I48.91]  Cerebrovascular accident (CVA) due to 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]                 Anticoagulation Episode Summary       INR check location:      Preferred lab:      Send INR reminders to:  [facility]    Comments:  **Home testing training 3/3/23** *CALL EVERY TIME* New INR goal 3 - 3.5 (see 1/2020 hospitalization for CVA)          Anticoagulation Care Providers       Provider Role Specialty Phone number    Griffin Fried MD Referring Cardiology [phone]            Clinic Interview:  Patient Findings     Negatives:  Signs/symptoms of thrombosis, Signs/symptoms of bleeding,   Laboratory test error suspected, Change in health, Change in alcohol use,   Change in activity, Upcoming invasive procedure, Emergency department   visit, Upcoming dental procedure, Missed doses, Extra doses, Change in   medications, Change in diet/appetite, Hospital admission, Bruising, Other   complaints      Clinical Outcomes     Negatives:  Major bleeding event, Thromboembolic event,   Anticoagulation-related hospital admission, Anticoagulation-related ED   visit, Anticoagulation-related fatality        INR History:      6/28/2024     8:15 AM 7/5/2024    12:00 AM 7/5/2024     9:02 AM 7/12/2024    12:00 AM 7/12/2024     8:38 AM 7/19/2024    12:00 AM 7/19/2024     8:20 AM   Anticoagulation Monitoring   INR 2.00  3.30  4.60  3.50   INR Date  6/28/2024 7/5/2024 7/12/2024 7/19/2024   INR Goal 3.0-3.5  3.0-3.5  3.0-3.5  3.0-3.5   Trend Same  Same  Same  Same   Last Week Total 25 mg  37.5 mg  32.5 mg  27.5 mg   Next Week Total 37.5 mg  32.5 mg  27.5 mg  32.5 mg   Sun 5 mg  5 mg  5 mg  5 mg   Mon 5 mg (7/1)  2.5 mg  2.5 mg  2.5 mg   Tue 5 mg  5 mg  5 mg  5 mg   Wed 5 mg  5 mg  5 mg  5 mg   Thu 5 mg  5 mg  5 mg  5 mg   Fri 5 mg  5 mg  Hold (7/12)  5 mg   Sat 7.5 mg (6/29)  5 mg  5 mg  5 mg   Historical INR  3.30      4.60      3.50            This result is from an external source.       Plan:  1. INR is Therapeutic today- see above in Anticoagulation Summary.   Will instruct LAURA Sequeira to continue their warfarin regimen- see above in Anticoagulation Summary.  2. Follow up in 1 weeks  3. They have been instructed to call if any changes in medications, doses, concerns, etc. Patient expresses understanding and has no further questions at this time.    Michelle Piña [facility]

## 2024-07-26 ENCOUNTER — ANTICOAGULATION VISIT (OUTPATIENT)
Dept: PHARMACY | Facility: HOSPITAL | Age: 89
End: 2024-07-26
Payer: COMMERCIAL

## 2024-07-26 DIAGNOSIS — Z95.4 HISTORY OF AORTIC VALVE REPLACEMENT WITH METALLIC VALVE: Primary | ICD-10-CM

## 2024-07-26 DIAGNOSIS — I63.411 CEREBROVASCULAR ACCIDENT (CVA) DUE TO EMBOLISM OF RIGHT MIDDLE CEREBRAL ARTERY: ICD-10-CM

## 2024-07-26 LAB — INR PPP: 3.2

## 2024-07-26 PROCEDURE — G0249 PROVIDE INR TEST MATER/EQUIP: HCPCS

## 2024-07-26 NOTE — PROGRESS NOTES
Anticoagulation Clinic Progress Note    Anticoagulation Summary  As of 7/26/2024      INR goal:  3.0-3.5   TTR:  65.7% (5.5 y)   INR used for dosing:  3.20 (7/26/2024)   Warfarin maintenance plan:  2.5 mg every Mon; 5 mg all other days   Weekly warfarin total:  32.5 mg   No change documented:  Michelle Piña, ROSALINDA   Plan last modified:  Vasquez Niño, PharmD (5/3/2024)   Next INR check:  8/2/2024   Priority:  High   Target end date:  Indefinite    Indications    History of aortic valve replacement with metallic valve [Z95.4]  Atrial fibrillation [I48.91]  Cerebrovascular accident (CVA) due to 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]                 Anticoagulation Episode Summary       INR check location:      Preferred lab:      Send INR reminders to:  [facility]    Comments:  **Home testing training 3/3/23** *CALL EVERY TIME* New INR goal 3 - 3.5 (see 1/2020 hospitalization for CVA)          Anticoagulation Care Providers       Provider Role Specialty Phone number    Griffin Fried MD Referring Cardiology [phone]            Clinic Interview:  No pertinent clinical findings have been reported.    INR History:      7/5/2024     9:02 AM 7/12/2024    12:00 AM 7/12/2024     8:38 AM 7/19/2024    12:00 AM 7/19/2024     8:20 AM 7/26/2024    12:00 AM 7/26/2024     8:15 AM   Anticoagulation Monitoring   INR 3.30  4.60  3.50  3.20   INR Date 7/5/2024 7/12/2024 7/19/2024 7/26/2024   INR Goal 3.0-3.5  3.0-3.5  3.0-3.5  3.0-3.5   Trend Same  Same  Same  Same   Last Week Total 37.5 mg  32.5 mg  27.5 mg  32.5 mg   Next Week Total 32.5 mg  27.5 mg  32.5 mg  32.5 mg   Sun 5 mg  5 mg  5 mg  5 mg   Mon 2.5 mg  2.5 mg  2.5 mg  2.5 mg   Tue 5 mg  5 mg  5 mg  5 mg   Wed 5 mg  5 mg  5 mg  5 mg   Thu 5 mg  5 mg  5 mg  5 mg   Fri 5 mg  Hold (7/12)  5 mg  5 mg   Sat 5 mg  5 mg  5 mg  5 mg   Historical INR  4.60      3.50      3.20            This result is from an external source.       Plan:  1.  INR is therapeutic today- see above in Anticoagulation Summary.    LAURA Sequeira to continue their warfarin regimen- see above in Anticoagulation Summary.  2. Follow up in 1 week  3. They have been instructed to call if any changes in medications, doses, concerns, etc. Patient expresses understanding and has no further questions at this time.    Michelle Piña, [facility]

## 2024-08-02 ENCOUNTER — ANTICOAGULATION VISIT (OUTPATIENT)
Dept: PHARMACY | Facility: HOSPITAL | Age: 89
End: 2024-08-02
Payer: COMMERCIAL

## 2024-08-02 DIAGNOSIS — Z95.4 HISTORY OF AORTIC VALVE REPLACEMENT WITH METALLIC VALVE: Primary | ICD-10-CM

## 2024-08-02 DIAGNOSIS — I63.411 CEREBROVASCULAR ACCIDENT (CVA) DUE TO EMBOLISM OF RIGHT MIDDLE CEREBRAL ARTERY: ICD-10-CM

## 2024-08-02 LAB
BUN SERPL-MCNC: 41 MG/DL (ref 8–23)
BUN/CREAT SERPL: 21.2 (ref 7–25)
CALCIUM SERPL-MCNC: 9.6 MG/DL (ref 8.6–10.5)
CHLORIDE SERPL-SCNC: 103 MMOL/L (ref 98–107)
CO2 SERPL-SCNC: 27.2 MMOL/L (ref 22–29)
CREAT SERPL-MCNC: 1.93 MG/DL (ref 0.76–1.27)
EGFRCR SERPLBLD CKD-EPI 2021: 33.3 ML/MIN/1.73
GLUCOSE SERPL-MCNC: 95 MG/DL (ref 65–99)
INR PPP: 3.8
POTASSIUM SERPL-SCNC: 4.5 MMOL/L (ref 3.5–5.2)
SODIUM SERPL-SCNC: 142 MMOL/L (ref 136–145)

## 2024-08-02 NOTE — PROGRESS NOTES
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]                 Anticoagulation Episode Summary       INR check location:      Preferred lab:      Send INR reminders to:  [facility]    Comments:  **Home testing training 3/3/23** *CALL EVERY TIME* New INR goal 3 - 3.5 (see 1/2020 hospitalization for CVA)          Anticoagulation Care Providers       Provider Role Specialty Phone number    Griffin Fried MD Referring Cardiology [phone]            Clinic Interview:  Patient Findings     Negatives:  Signs/symptoms of thrombosis, Signs/symptoms of bleeding,   Laboratory test error suspected, Change in health, Change in alcohol use,   Change in activity, Upcoming invasive procedure, Emergency department   visit, Upcoming dental procedure, Missed doses, Extra doses, Change in   medications, Change in diet/appetite, Hospital admission, Bruising, Other   complaints      Clinical Outcomes     Negatives:  Major bleeding event, Thromboembolic event,   Anticoagulation-related hospital admission, Anticoagulation-related ED   visit, Anticoagulation-related fatality        INR History:      7/12/2024     8:38 AM 7/19/2024    12:00 AM 7/19/2024     8:20 AM 7/26/2024    12:00 AM 7/26/2024     8:15 AM 8/2/2024    12:00 AM 8/2/2024    10:35 AM   Anticoagulation Monitoring   INR 4.60  3.50  3.20  3.80   INR Date 7/12/2024 7/19/2024 7/26/2024 8/2/2024   INR Goal  3.0-3.5  3.0-3.5  3.0-3.5  3.0-3.5   Trend Same  Same  Same  Same   Last Week Total 32.5 mg  27.5 mg  32.5 mg  32.5 mg   Next Week Total 27.5 mg  32.5 mg  32.5 mg  30 mg   Sun 5 mg  5 mg  5 mg  5 mg   Mon 2.5 mg  2.5 mg  2.5 mg  2.5 mg   Tue 5 mg  5 mg  5 mg  5 mg   Wed 5 mg  5 mg  5 mg  5 mg   Thu 5 mg  5 mg  5 mg  5 mg   Fri Hold (7/12)  5 mg  5 mg  2.5 mg (8/2)   Sat 5 mg  5 mg  5 mg  5 mg   Historical INR  3.50      3.20      3.80            This result is from an external source.       Plan:  1. INR is Supratherapeutic today- see above in Anticoagulation Summary.   Will instruct LAURA Sequeira to Change their warfarin regimen (2.5 mg today, then resume 2.5 mg Mon, 5 mg all other days) - see above in Anticoagulation Summary.  2. Follow up in 1 week.  3. They have been instructed to call if any changes in medications, doses, concerns, etc. Patient expresses understanding and has no further questions at this time.    Vasquez Niño, PharmD

## 2024-08-06 RX ORDER — FAMOTIDINE 20 MG/1
20 TABLET, FILM COATED ORAL DAILY
Qty: 90 TABLET | Refills: 1 | Status: SHIPPED | OUTPATIENT
Start: 2024-08-06

## 2024-08-09 ENCOUNTER — ANTICOAGULATION VISIT (OUTPATIENT)
Dept: PHARMACY | Facility: HOSPITAL | Age: 89
End: 2024-08-09
Payer: COMMERCIAL

## 2024-08-09 DIAGNOSIS — I63.411 CEREBROVASCULAR ACCIDENT (CVA) DUE TO EMBOLISM OF RIGHT MIDDLE CEREBRAL ARTERY: ICD-10-CM

## 2024-08-09 DIAGNOSIS — Z95.4 HISTORY OF AORTIC VALVE REPLACEMENT WITH METALLIC VALVE: Primary | ICD-10-CM

## 2024-08-09 LAB — INR PPP: 4

## 2024-08-09 NOTE — PROGRESS NOTES
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]                 Anticoagulation Episode Summary       INR check location:      Preferred lab:      Send INR reminders to:  [facility]    Comments:  **Home testing training 3/3/23** *CALL EVERY TIME* New INR goal 3 - 3.5 (see 2020 hospitalization for CVA)          Anticoagulation Care Providers       Provider Role Specialty Phone number    Griffin Fried MD Referring Cardiology [phone]            Clinic Interview:  Patient Findings     Negatives:  Signs/symptoms of thrombosis, Signs/symptoms of bleeding,   Laboratory test error suspected, Change in health, Change in alcohol use,   Change in activity, Upcoming invasive procedure, Emergency department   visit, Upcoming dental procedure, Missed doses, Extra doses, Change in   medications, Change in diet/appetite, Hospital admission, Bruising, Other   complaints      Clinical Outcomes     Negatives:  Major bleeding event, Thromboembolic event,   Anticoagulation-related hospital admission, Anticoagulation-related ED   visit, Anticoagulation-related fatality        INR History:      2024     8:20 AM 2024    12:00 AM 2024     8:15 AM 2024    12:00 AM 2024    10:35 AM 2024    12:00 AM 2024     8:26 AM   Anticoagulation Monitoring   INR 3.50  3.20  3.80  4.00   INR Date 2024   INR  Goal 3.0-3.5  3.0-3.5  3.0-3.5  3.0-3.5   Trend Same  Same  Same  Down   Last Week Total 27.5 mg  32.5 mg  32.5 mg  30 mg   Next Week Total 32.5 mg  32.5 mg  30 mg  30 mg   Sun 5 mg  5 mg  5 mg  5 mg   Mon 2.5 mg  2.5 mg  2.5 mg  2.5 mg   Tue 5 mg  5 mg  5 mg  5 mg   Wed 5 mg  5 mg  5 mg  5 mg   Thu 5 mg  5 mg  5 mg  5 mg   Fri 5 mg  5 mg  2.5 mg (8/2)  2.5 mg   Sat 5 mg  5 mg  5 mg  5 mg   Historical INR  3.20      3.80      4.00            This result is from an external source.       Plan:  1. INR is Supratherapeutic today- see above in Anticoagulation Summary.   Will instruct LAURA Sequeira to Change their warfarin regimen to 2.5 mg MF, 5 mg all other days - see above in Anticoagulation Summary.  2. Follow up in 1 week.  3. They have been instructed to call if any changes in medications, doses, concerns, etc. Patient expresses understanding and has no further questions at this time.    Vasquez Niño, PharmD

## 2024-08-15 DIAGNOSIS — E11.69 TYPE 2 DIABETES MELLITUS WITH OTHER SPECIFIED COMPLICATION, UNSPECIFIED WHETHER LONG TERM INSULIN USE: ICD-10-CM

## 2024-08-15 RX ORDER — FLASH GLUCOSE SENSOR
1 KIT MISCELLANEOUS
Qty: 6 EACH | Refills: 3 | Status: SHIPPED | OUTPATIENT
Start: 2024-08-15

## 2024-08-16 ENCOUNTER — ANTICOAGULATION VISIT (OUTPATIENT)
Dept: PHARMACY | Facility: HOSPITAL | Age: 89
End: 2024-08-16
Payer: COMMERCIAL

## 2024-08-16 DIAGNOSIS — I63.411 CEREBROVASCULAR ACCIDENT (CVA) DUE TO EMBOLISM OF RIGHT MIDDLE CEREBRAL ARTERY: ICD-10-CM

## 2024-08-16 DIAGNOSIS — Z95.4 HISTORY OF AORTIC VALVE REPLACEMENT WITH METALLIC VALVE: Primary | ICD-10-CM

## 2024-08-16 LAB — INR PPP: 3.7

## 2024-08-16 NOTE — PROGRESS NOTES
Anticoagulation Clinic Progress Note    Anticoagulation Summary  As of 8/16/2024      INR goal:  3.0-3.5   TTR:  65.2% (5.6 y)   INR used for dosing:  3.70 (8/16/2024)   Warfarin maintenance plan:  2.5 mg every Mon, Fri; 5 mg all other days   Weekly warfarin total:  30 mg   No change documented:  Vasquez Niño, Pedro   Plan last modified:  Vasquez Niño, PharmD (8/9/2024)   Next INR check:  8/23/2024   Priority:  High   Target end date:  Indefinite    Indications    History of aortic valve replacement with metallic valve [Z95.4]  Atrial fibrillation [I48.91]  Cerebrovascular accident (CVA) due to 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]                 Anticoagulation Episode Summary       INR check location:      Preferred lab:      Send INR reminders to:  [facility]    Comments:  **Home testing training 3/3/23** *CALL EVERY TIME* New INR goal 3 - 3.5 (see 1/2020 hospitalization for CVA)          Anticoagulation Care Providers       Provider Role Specialty Phone number    Griffin Fried MD Referring Cardiology [phone]            Clinic Interview:  Patient Findings     Positives:  Change in alcohol use    Negatives:  Signs/symptoms of thrombosis, Signs/symptoms of bleeding,   Laboratory test error suspected, Change in health, Change in activity,   Upcoming invasive procedure, Emergency department visit, Upcoming dental   procedure, Missed doses, Extra doses, Change in medications, Change in   diet/appetite, Hospital admission, Bruising, Other complaints    Comments:  Reports 1 bourbon drink yesterday.       Clinical Outcomes     Negatives:  Major bleeding event, Thromboembolic event,   Anticoagulation-related hospital admission, Anticoagulation-related ED   visit, Anticoagulation-related fatality    Comments:  Reports 1 bourbon drink yesterday.         INR History:      7/26/2024     8:15 AM 8/2/2024    12:00 AM 8/2/2024    10:35 AM 8/9/2024    12:00 AM 8/9/2024     8:26 AM  8/16/2024    12:00 AM 8/16/2024     8:18 AM   Anticoagulation Monitoring   INR 3.20  3.80  4.00  3.70   INR Date 7/26/2024 8/2/2024 8/9/2024 8/16/2024   INR Goal 3.0-3.5  3.0-3.5  3.0-3.5  3.0-3.5   Trend Same  Same  Down  Same   Last Week Total 32.5 mg  32.5 mg  30 mg  30 mg   Next Week Total 32.5 mg  30 mg  30 mg  30 mg   Sun 5 mg  5 mg  5 mg  5 mg   Mon 2.5 mg  2.5 mg  2.5 mg  2.5 mg   Tue 5 mg  5 mg  5 mg  5 mg   Wed 5 mg  5 mg  5 mg  5 mg   Thu 5 mg  5 mg  5 mg  5 mg   Fri 5 mg  2.5 mg (8/2)  2.5 mg  2.5 mg   Sat 5 mg  5 mg  5 mg  5 mg   Historical INR  3.80      4.00      3.70            This result is from an external source.       Plan:  1. INR is Supratherapeutic today- see above in Anticoagulation Summary.   Will instruct LAURA Sequeira to Continue their warfarin regimen- see above in Anticoagulation Summary.  2. Follow up in 1 week. Hesitant to reduce dose further due to CVA history and narrow INR goal. If still supratherapeutic, will plan for 2.5 mg 3x/wk.   3. They have been instructed to call if any changes in medications, doses, concerns, etc. Patient expresses understanding and has no further questions at this time.    Vasquez Niño, PharmD

## 2024-08-23 ENCOUNTER — ANTICOAGULATION VISIT (OUTPATIENT)
Dept: PHARMACY | Facility: HOSPITAL | Age: 89
End: 2024-08-23
Payer: COMMERCIAL

## 2024-08-23 ENCOUNTER — OFFICE VISIT (OUTPATIENT)
Dept: INTERNAL MEDICINE | Facility: CLINIC | Age: 89
End: 2024-08-23
Payer: COMMERCIAL

## 2024-08-23 VITALS
HEART RATE: 58 BPM | DIASTOLIC BLOOD PRESSURE: 80 MMHG | HEIGHT: 72 IN | BODY MASS INDEX: 20.05 KG/M2 | OXYGEN SATURATION: 95 % | WEIGHT: 148 LBS | SYSTOLIC BLOOD PRESSURE: 140 MMHG

## 2024-08-23 DIAGNOSIS — N18.32 STAGE 3B CHRONIC KIDNEY DISEASE: ICD-10-CM

## 2024-08-23 DIAGNOSIS — F01.A0 MILD VASCULAR DEMENTIA WITHOUT BEHAVIORAL DISTURBANCE, PSYCHOTIC DISTURBANCE, MOOD DISTURBANCE, OR ANXIETY: ICD-10-CM

## 2024-08-23 DIAGNOSIS — Z79.4 TYPE 2 DIABETES MELLITUS WITH HYPERGLYCEMIA, WITH LONG-TERM CURRENT USE OF INSULIN: Primary | ICD-10-CM

## 2024-08-23 DIAGNOSIS — I63.411 CEREBROVASCULAR ACCIDENT (CVA) DUE TO EMBOLISM OF RIGHT MIDDLE CEREBRAL ARTERY: ICD-10-CM

## 2024-08-23 DIAGNOSIS — Z95.4 HISTORY OF AORTIC VALVE REPLACEMENT WITH METALLIC VALVE: Primary | ICD-10-CM

## 2024-08-23 DIAGNOSIS — E11.65 TYPE 2 DIABETES MELLITUS WITH HYPERGLYCEMIA, WITH LONG-TERM CURRENT USE OF INSULIN: Primary | ICD-10-CM

## 2024-08-23 LAB
HBA1C MFR BLD: 8 % (ref 4.8–5.6)
INR PPP: 3.3

## 2024-08-23 PROCEDURE — 99214 OFFICE O/P EST MOD 30 MIN: CPT | Performed by: STUDENT IN AN ORGANIZED HEALTH CARE EDUCATION/TRAINING PROGRAM

## 2024-08-23 PROCEDURE — G0249 PROVIDE INR TEST MATER/EQUIP: HCPCS

## 2024-08-23 RX ORDER — DONEPEZIL HYDROCHLORIDE 5 MG/1
5 TABLET, FILM COATED ORAL NIGHTLY
Qty: 30 TABLET | Refills: 2 | Status: SHIPPED | OUTPATIENT
Start: 2024-08-23

## 2024-08-23 NOTE — PROGRESS NOTES
Celestine English D.O.  Internal Medicine  [facility]  [address], [address]  [place] [address]  [phone]      Chief Complaint  Diabetes    SUBJECTIVE    History of Present Illness    LAURA Sequeira is a 88 y.o. male who presents to the office today as an established patient that last saw me on 5/3/2024.     Type 2 diabetes/CKD 3b and microalbuminuria: still using VGO 40. Insurance did not cover Farxiga so now on Jardiance (empagliflozin) 10 mg daily. Will take extra clicks , average from 0-4 daily , but wife states he is not as good at using them any more. Fasting sugar averages 80s-140 depending on what he had for dinner last night.  He uses a freestyle vance. He has a Gvoke HypoPen. No new symptoms of low blood sugar. Follows with Dr Dominguez with nephrology .  Lab Results   Component Value Date    HGBA1C 8.20 (H) 05/03/2024       Also wants to discuss recent neuropsych eval results and they are interested in medication options for vascular dementia.    Allergies   Allergen Reactions    Penicillins Swelling    Oxycodone-Acetaminophen Nausea And Vomiting    Other Other (See Comments)     Grass, ragweed    Percocet [Oxycodone-Acetaminophen] Nausea And Vomiting        Outpatient Medications Marked as Taking for the 8/23/24 encounter (Office Visit) with Celestine English, DO   Medication Sig Dispense Refill    atorvastatin (LIPITOR) 40 MG tablet TAKE 1 TABLET BY MOUTH DAILY 90 tablet 1    Continuous Glucose Sensor (FreeStyle Vance 14 Day Sensor) misc Apply 1 each topically Every 14 (Fourteen) Days. 6 each 3    digoxin (LANOXIN) 125 MCG tablet TAKE 1/2 TABLET BY MOUTH DAILY 45 tablet 1    empagliflozin (Jardiance) 10 MG tablet tablet Take 1 tablet by mouth Daily. 30 tablet 2    famotidine (PEPCID) 20 MG tablet TAKE 1 TABLET BY MOUTH DAILY 90 tablet 1    Glucagon (Gvoke HypoPen 1-Pack) 1 MG/0.2ML solution auto-injector Inject 1 mg under the skin into the appropriate area as directed 1 (One)  Time As Needed (for symptoms of low blood sugar. Report to ER immediately after use.) for up to 1 dose. 0.2 mL 1    guaiFENesin (MUCINEX) 600 MG 12 hr tablet Take 1 tablet by mouth.      Insulin Disposable Pump (V-Go 40) 40 UNIT/24HR kit 1 each Daily. 30 kit 5    Insulin Lispro (humaLOG) 100 UNIT/ML injection INJECT IN V-GO 40  UNITS UNDER THE SKIN OF INSULIN DAILY 30 mL 5    magnesium oxide (MAG-OX) 400 MG tablet Take 1 tablet by mouth 2 (Two) Times a Day.      metoprolol succinate XL (TOPROL-XL) 25 MG 24 hr tablet Take 1 tablet by mouth Daily. 90 tablet 1    vitamin D (ERGOCALCIFEROL) 1.25 MG (48300 UT) capsule capsule Take 1 capsule by mouth 1 (One) Time Per Week.      warfarin (COUMADIN) 5 MG tablet TAKE ONE-HALF TABLET (2.5 MG) BY MOUTH ON FRIDAY AND TAKE ONE TABLET (5 MG) ON ALL OTHER DAYS AS DIRECTED 90 tablet 1    [DISCONTINUED] diphenhydrAMINE (BENADRYL) 25 mg capsule Take 1 capsule by mouth 2 (Two) Times a Day As Needed for Itching, Allergies or Sleep.          Past Medical History:   Diagnosis Date    Allergic rhinitis     Aortic valve insufficiency     Ascending aortic aneurysm     Aspiration pneumonia     Atrial fibrillation     Bacteremia     Calcific aortic stenosis of bicuspid valve     Cardiac arrest     Cataracts, bilateral     CKD (chronic kidney disease) stage 3, GFR 30-59 ml/min     Contact dermatitis due to poison ivy     Elbow fracture     GERD (gastroesophageal reflux disease)     GI bleed     2021; s/p Colonoscopy and EGD    H/O mechanical aortic valve replacement     Head injury     History of transfusion     Hyperlipidemia     Hypertension     Kidney carcinoma     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: follows with [facility] for management    Nonischemic cardiomyopathy     Prostate cancer     Renal oncocytoma     Stroke (cerebrum)     Type 2 diabetes mellitus     Visual field defect        OBJECTIVE    Vital Signs:   /80   Pulse 58  "  Ht 182.9 cm (72\")   Wt 67.1 kg (148 lb)   SpO2 95%   BMI 20.07 kg/m²        Physical Exam  Vitals reviewed.   Constitutional:       General: He is not in acute distress.     Appearance: Normal appearance. He is normal weight. He is ill-appearing (chronically).   Eyes:      General: No scleral icterus.  Pulmonary:      Effort: Pulmonary effort is normal. No respiratory distress.   Musculoskeletal:      Comments: Ambulating with walker   Skin:     Coloration: Skin is not jaundiced.   Neurological:      Mental Status: He is alert.   Psychiatric:         Mood and Affect: Mood normal.         Behavior: Behavior normal.         Thought Content: Thought content normal.                             ASSESSMENT & PLAN     Diagnoses and all orders for this visit:    1. Type 2 diabetes mellitus with hyperglycemia, with long-term current use of insulin (Primary)  2. Stage 3b chronic kidney disease  -still using VGO 40. Insurance did not cover Farxiga so now on Jardiance (empagliflozin) 10 mg daily. Will take extra clicks , average from 0-4 daily , but wife states he is not as good at using them any more. Fasting sugar averages 80s-140 depending on what he had for dinner last night.  He uses a freestyle lisa. He has a Gvoke HypoPen. No new symptoms of low blood sugar. Follows with Dr Dominguez with nephrology .  Lab Results   Component Value Date    HGBA1C 8.20 (H) 05/03/2024     -goal A1c less than 7.5  -reviewed May 2024 Nephrology progress note  -at this point he is becoming less reliable with the VGO system. His family does like this system and have been hesitant to discontinue it in the past. My concerns remain about the potential for incorrect / overuse and hypoglycemia event. Obviously it is providing some A1c benefit and stopping it when he is uncontrolled like he is now is also not a good idea.  -recheck A1c with start of Jardiance. I do not believe his renal function and hydration status will tolerate any further " increase in Jardiance.  -his renal function will not support metformin  -pioglitazone contraindicated due to his EF  -A GLP 1 RA medication is an option but do not want to push his appetite down and have weight loss due to his frailty. Perhaps DPP 4 inhibitor such as sitagliptin would be an option if he remains far from his goal A1c today.     3. Mild vascular dementia without behavioral disturbance, psychotic disturbance, mood disturbance, or anxiety  -I reviewed report from neuropsychologist.  The use of donepezil was recommended.  See previous progress note for full history of his dementia.  He has had TSH, B12, imaging evaluation already.  I introduced donepezil to the patient and his wife and discussed that these medications do not change the ultimate course of his dementia but could result in improved cognition for several years.  Side effects include changes in sleep, risk of injury, diarrhea, abdominal discomfort.  Obviously with his borderline hydration status we would not want him to have diarrhea and he should report this immediately and stop the medicine if that occurs.  After hearing the potential benefit as well as the risks the patient and his wife are agreeable to try.  I will start donepezil 5 mg daily and follow-up at his next visit in 3 months.  If he is tolerating it we can consider increase to 10 mg daily.  -stop benadryl for allergies and use zyrtec or clairtin otc   -     donepezil (Aricept) 5 MG tablet; Take 1 tablet by mouth Every Night.  Dispense: 30 tablet; Refill: 2            Follow Up  Return in about 3 months (around 11/23/2024) for Recheck.    Patient/family had no further questions at this time and verbalized understanding of the plan discussed today.

## 2024-08-23 NOTE — PROGRESS NOTES
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    INR History:      8/2/2024    10:35 AM 8/9/2024    12:00 AM 8/9/2024     8:26 AM 8/16/2024    12:00 AM 8/16/2024     8:18 AM 8/23/2024    12:00 AM 8/23/2024     8:00 AM   Anticoagulation Monitoring   INR 3.80  4.00  3.70  3.30   INR Date 8/2/2024 8/9/2024 8/16/2024 8/23/2024   INR Goal 3.0-3.5  3.0-3.5  3.0-3.5  3.0-3.5   Trend Same  Down  Same  Same   Last Week Total 32.5 mg  30 mg  30 mg  30 mg   Next Week Total 30 mg  30 mg  30 mg  30 mg   Sun 5 mg  5 mg  5 mg  5 mg   Mon 2.5 mg  2.5 mg  2.5 mg  2.5 mg   Tue 5 mg  5 mg  5 mg  5 mg   Wed 5 mg  5 mg  5 mg  5 mg   Thu 5 mg  5 mg  5 mg  5 mg   Fri 2.5 mg (8/2)  2.5 mg  2.5 mg  2.5 mg   Sat 5 mg  5 mg  5 mg  5 mg   Historical INR  4.00      3.70      3.30            This result is from an external source.       Plan:  1. INR is  therapeutic today- see above in Anticoagulation Summary.    LAURA Sequeira to continue their warfarin regimen- see above in Anticoagulation Summary.  2. Follow up in 1 week  3. They have been instructed to call if any changes in medications, doses, concerns, etc. Patient expresses understanding and has no further questions at this time.    Michelle Piña, [facility]

## 2024-08-26 RX ORDER — WARFARIN SODIUM 5 MG/1
TABLET ORAL
Qty: 80 TABLET | Refills: 1 | Status: SHIPPED | OUTPATIENT
Start: 2024-08-26

## 2024-08-30 ENCOUNTER — ANTICOAGULATION VISIT (OUTPATIENT)
Dept: PHARMACY | Facility: HOSPITAL | Age: 89
End: 2024-08-30
Payer: COMMERCIAL

## 2024-08-30 DIAGNOSIS — I63.411 CEREBROVASCULAR ACCIDENT (CVA) DUE TO EMBOLISM OF RIGHT MIDDLE CEREBRAL ARTERY: ICD-10-CM

## 2024-08-30 DIAGNOSIS — Z95.4 HISTORY OF AORTIC VALVE REPLACEMENT WITH METALLIC VALVE: Primary | ICD-10-CM

## 2024-08-30 LAB — INR PPP: 3.9

## 2024-08-30 RX ORDER — CETIRIZINE HYDROCHLORIDE 10 MG/1
10 TABLET ORAL DAILY
COMMUNITY

## 2024-08-30 NOTE — PROGRESS NOTES
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]                 Anticoagulation Episode Summary       INR check location:      Preferred lab:      Send INR reminders to:  [facility]    Comments:  **Home testing training 3/3/23** *CALL EVERY TIME* New INR goal 3 - 3.5 (see 1/2020 hospitalization for CVA)          Anticoagulation Care Providers       Provider Role Specialty Phone number    Griffin Fried MD Referring Cardiology [phone]            Clinic Interview:  Patient Findings     Negatives:  Signs/symptoms of thrombosis, Signs/symptoms of bleeding,   Laboratory test error suspected, Change in health, Change in alcohol use,   Change in activity, Upcoming invasive procedure, Emergency department   visit, Upcoming dental procedure, Missed doses, Extra doses, Change in   medications, Change in diet/appetite, Hospital admission, Bruising, Other   complaints      Clinical Outcomes     Negatives:  Major bleeding event, Thromboembolic event,   Anticoagulation-related hospital admission, Anticoagulation-related ED   visit, Anticoagulation-related fatality        INR History:      8/9/2024     8:26 AM 8/16/2024    12:00 AM 8/16/2024     8:18 AM 8/23/2024    12:00 AM 8/23/2024     8:00 AM 8/30/2024    12:00 AM 8/30/2024     9:38 AM   Anticoagulation Monitoring   INR 4.00  3.70  3.30  3.90   INR Date  8/9/2024 8/16/2024 8/23/2024 8/30/2024   INR Goal 3.0-3.5  3.0-3.5  3.0-3.5  3.0-3.5   Trend Down  Same  Same  Same   Last Week Total 30 mg  30 mg  30 mg  30 mg   Next Week Total 30 mg  30 mg  30 mg  30 mg   Sun 5 mg  5 mg  5 mg  5 mg   Mon 2.5 mg  2.5 mg  2.5 mg  2.5 mg   Tue 5 mg  5 mg  5 mg  5 mg   Wed 5 mg  5 mg  5 mg  5 mg   Thu 5 mg  5 mg  5 mg  5 mg   Fri 2.5 mg  2.5 mg  2.5 mg  2.5 mg   Sat 5 mg  5 mg  5 mg  5 mg   Historical INR  3.70      3.30      3.90        Visit Report    Report Report         This result is from an external source.       Plan:  1. INR is Supratherapeutic today- see above in Anticoagulation Summary. He has a narrow INR goal and significant history of multiple CVAs.  Will instruct LAURA Sequeira to Continue their warfarin regimen at dose of 2.5 mg MF, 5 mg all other days for now (30 mg/wk) - see above in Anticoagulation Summary.  2. Follow up in 1 week. If persistently supratherapeutic, will consider decrease to 27.5 mg/wk.   3. They have been instructed to call if any changes in medications, doses, concerns, etc. Patient expresses understanding and has no further questions at this time.    Vasquez Niño, PharmD

## 2024-08-31 ENCOUNTER — APPOINTMENT (OUTPATIENT)
Dept: GENERAL RADIOLOGY | Facility: HOSPITAL | Age: 89
End: 2024-08-31
Payer: COMMERCIAL

## 2024-08-31 ENCOUNTER — HOSPITAL ENCOUNTER (EMERGENCY)
Facility: HOSPITAL | Age: 89
Discharge: HOME | End: 2024-08-31
Attending: EMERGENCY MEDICINE
Payer: COMMERCIAL

## 2024-08-31 VITALS
RESPIRATION RATE: 18 BRPM | SYSTOLIC BLOOD PRESSURE: 128 MMHG | HEIGHT: 72 IN | TEMPERATURE: 99.5 F | WEIGHT: 150 LBS | DIASTOLIC BLOOD PRESSURE: 66 MMHG | OXYGEN SATURATION: 97 % | HEART RATE: 58 BPM | BODY MASS INDEX: 20.32 KG/M2

## 2024-08-31 DIAGNOSIS — Z79.01 ANTICOAGULATED ON WARFARIN: ICD-10-CM

## 2024-08-31 DIAGNOSIS — R05.1 ACUTE COUGH: Primary | ICD-10-CM

## 2024-08-31 LAB
ALBUMIN SERPL-MCNC: 3.6 G/DL (ref 3.5–5.2)
ALBUMIN/GLOB SERPL: 1.2 G/DL
ALP SERPL-CCNC: 104 U/L (ref 39–117)
ALT SERPL W P-5'-P-CCNC: 14 U/L (ref 1–41)
ANION GAP SERPL CALCULATED.3IONS-SCNC: 9.6 MMOL/L (ref 5–15)
AST SERPL-CCNC: 13 U/L (ref 1–40)
B PARAPERT DNA SPEC QL NAA+PROBE: NOT DETECTED
B PERT DNA SPEC QL NAA+PROBE: NOT DETECTED
BASOPHILS # BLD AUTO: 0.03 10*3/MM3 (ref 0–0.2)
BASOPHILS NFR BLD AUTO: 0.4 % (ref 0–1.5)
BILIRUB SERPL-MCNC: 0.4 MG/DL (ref 0–1.2)
BUN SERPL-MCNC: 32 MG/DL (ref 8–23)
BUN/CREAT SERPL: 20 (ref 7–25)
C PNEUM DNA NPH QL NAA+NON-PROBE: NOT DETECTED
CALCIUM SPEC-SCNC: 8.5 MG/DL (ref 8.6–10.5)
CHLORIDE SERPL-SCNC: 105 MMOL/L (ref 98–107)
CO2 SERPL-SCNC: 23.4 MMOL/L (ref 22–29)
CREAT SERPL-MCNC: 1.6 MG/DL (ref 0.76–1.27)
DEPRECATED RDW RBC AUTO: 46.8 FL (ref 37–54)
EGFRCR SERPLBLD CKD-EPI 2021: 41.7 ML/MIN/1.73
EOSINOPHIL # BLD AUTO: 0.11 10*3/MM3 (ref 0–0.4)
EOSINOPHIL NFR BLD AUTO: 1.4 % (ref 0.3–6.2)
ERYTHROCYTE [DISTWIDTH] IN BLOOD BY AUTOMATED COUNT: 15.4 % (ref 12.3–15.4)
FLUAV SUBTYP SPEC NAA+PROBE: NOT DETECTED
FLUBV RNA ISLT QL NAA+PROBE: NOT DETECTED
GLOBULIN UR ELPH-MCNC: 3.1 GM/DL
GLUCOSE SERPL-MCNC: 109 MG/DL (ref 65–99)
HADV DNA SPEC NAA+PROBE: NOT DETECTED
HCOV 229E RNA SPEC QL NAA+PROBE: NOT DETECTED
HCOV HKU1 RNA SPEC QL NAA+PROBE: NOT DETECTED
HCOV NL63 RNA SPEC QL NAA+PROBE: NOT DETECTED
HCOV OC43 RNA SPEC QL NAA+PROBE: NOT DETECTED
HCT VFR BLD AUTO: 44 % (ref 37.5–51)
HGB BLD-MCNC: 13.9 G/DL (ref 13–17.7)
HMPV RNA NPH QL NAA+NON-PROBE: NOT DETECTED
HOLD SPECIMEN: NORMAL
HPIV1 RNA ISLT QL NAA+PROBE: NOT DETECTED
HPIV2 RNA SPEC QL NAA+PROBE: NOT DETECTED
HPIV3 RNA NPH QL NAA+PROBE: NOT DETECTED
HPIV4 P GENE NPH QL NAA+PROBE: NOT DETECTED
IMM GRANULOCYTES # BLD AUTO: 0.02 10*3/MM3 (ref 0–0.05)
IMM GRANULOCYTES NFR BLD AUTO: 0.3 % (ref 0–0.5)
LYMPHOCYTES # BLD AUTO: 1.06 10*3/MM3 (ref 0.7–3.1)
LYMPHOCYTES NFR BLD AUTO: 13.7 % (ref 19.6–45.3)
M PNEUMO IGG SER IA-ACNC: NOT DETECTED
MCH RBC QN AUTO: 26.5 PG (ref 26.6–33)
MCHC RBC AUTO-ENTMCNC: 31.6 G/DL (ref 31.5–35.7)
MCV RBC AUTO: 84 FL (ref 79–97)
MONOCYTES # BLD AUTO: 0.45 10*3/MM3 (ref 0.1–0.9)
MONOCYTES NFR BLD AUTO: 5.8 % (ref 5–12)
NEUTROPHILS NFR BLD AUTO: 6.05 10*3/MM3 (ref 1.7–7)
NEUTROPHILS NFR BLD AUTO: 78.4 % (ref 42.7–76)
NRBC BLD AUTO-RTO: 0 /100 WBC (ref 0–0.2)
PLATELET # BLD AUTO: 153 10*3/MM3 (ref 140–450)
PMV BLD AUTO: 11.5 FL (ref 6–12)
POTASSIUM SERPL-SCNC: 4.4 MMOL/L (ref 3.5–5.2)
PROT SERPL-MCNC: 6.7 G/DL (ref 6–8.5)
RBC # BLD AUTO: 5.24 10*6/MM3 (ref 4.14–5.8)
RHINOVIRUS RNA SPEC NAA+PROBE: NOT DETECTED
RSV RNA NPH QL NAA+NON-PROBE: NOT DETECTED
SARS-COV-2 RNA NPH QL NAA+NON-PROBE: NOT DETECTED
SODIUM SERPL-SCNC: 138 MMOL/L (ref 136–145)
WBC NRBC COR # BLD AUTO: 7.72 10*3/MM3 (ref 3.4–10.8)
WHOLE BLOOD HOLD COAG: NORMAL

## 2024-08-31 PROCEDURE — 80053 COMPREHEN METABOLIC PANEL: CPT | Performed by: EMERGENCY MEDICINE

## 2024-08-31 PROCEDURE — 99283 EMERGENCY DEPT VISIT LOW MDM: CPT

## 2024-08-31 PROCEDURE — 85025 COMPLETE CBC W/AUTO DIFF WBC: CPT | Performed by: EMERGENCY MEDICINE

## 2024-08-31 PROCEDURE — 0202U NFCT DS 22 TRGT SARS-COV-2: CPT | Performed by: EMERGENCY MEDICINE

## 2024-08-31 PROCEDURE — 71045 X-RAY EXAM CHEST 1 VIEW: CPT

## 2024-08-31 RX ORDER — GUAIFENESIN AND DEXTROMETHORPHAN HYDROBROMIDE 600; 30 MG/1; MG/1
1 TABLET, EXTENDED RELEASE ORAL 2 TIMES DAILY PRN
Qty: 20 TABLET | Refills: 0 | Status: SHIPPED | OUTPATIENT
Start: 2024-08-31

## 2024-08-31 NOTE — DISCHARGE INSTRUCTIONS
Take cough medication as prescribed.  Return to the emergency department for worsening/persistent symptoms, shortness of breath, fever, chest pain, or other concern.

## 2024-08-31 NOTE — ED PROVIDER NOTES
EMERGENCY DEPARTMENT ENCOUNTER    Room Number:  18/18  PCP: Celestine English DO  Historian: Patient, spouse    I initially evaluated the patient at 1329    HPI:  Chief Complaint: Cough  A complete HPI/ROS/PMH/PSH/SH/FH are unobtainable due to: Nothing  Context: LAURA Sequeira is a 88 y.o. male with a medical history of A-fib, chronic kidney disease, type 2 diabetes, hyperlipidemia, aortic valve replacement who presents to the ED c/o acute cough.  Cough began 2 days ago.  Cough is nonproductive.  He reports a runny nose.  He has been taking Zyrtec without relief.  Denies fever, sore throat, chest pain, shortness of breath, abdominal pain, vomiting, diarrhea, leg pain, or leg swelling.  He is on warfarin and digoxin.            PAST MEDICAL HISTORY  Active Ambulatory Problems     Diagnosis Date Noted    Atrial fibrillation     Hypertension     Atopic rhinitis 03/21/2016    Gastroesophageal reflux disease 03/21/2016    Hyperlipidemia 03/21/2016    Type 2 diabetes mellitus 03/21/2016    Low testosterone 04/03/2017    History of aortic valve replacement with metallic valve 09/19/2018    Kidney carcinoma 01/13/2020    Stage 3b chronic kidney disease 01/13/2020    Cerebrovascular accident (CVA) due to embolism of right middle cerebral artery 04/09/2020    Iron deficiency anemia     Chronic anticoagulation     Hemiparesis of left nondominant side as late effect of cerebral infarction 11/02/2021    Rectus sheath hematoma 11/05/2021    Hospital discharge follow-up 11/19/2021    Sepsis 03/23/2022    Calculus of gallbladder with acute cholecystitis without obstruction 03/23/2022    History of Clostridium difficile infection 03/23/2022    Aspiration pneumonitis 03/23/2022    Hematoma of iliopsoas muscle, left, initial encounter 04/06/2022    History of CVA (cerebrovascular accident) 04/06/2022    S/P laparoscopic cholecystectomy 04/06/2022    Anemia 04/06/2022    Hematoma of left iliopsoas muscle 04/20/2022    Pneumonia of right  lower lobe due to infectious organism 01/30/2023    Supratherapeutic INR 01/31/2023     Resolved Ambulatory Problems     Diagnosis Date Noted    Cardiomyopathy     Uncontrolled type 2 diabetes mellitus 03/21/2016    Poison ivy 09/06/2016    Low testosterone 04/07/2017    Medicare annual wellness visit, subsequent 10/30/2017    Sinusitis 05/01/2018    Hx of mitral valve replacement with mechanical valve [Z95.2] 09/19/2018    Screening for iron deficiency anemia 11/01/2018    Stroke-like symptom 01/13/2020    BYRON (acute kidney injury) 01/14/2020    Acute cerebral infarction 01/14/2020    Left-sided weakness 10/24/2021    Pleural effusion on right 10/24/2021    Lower GI bleed 11/02/2021    History of nephrectomy 11/02/2021    Abnormal urinalysis 11/02/2021    Pleural effusion 11/02/2021    Acute posthemorrhagic anemia 11/05/2021    Angiodysplasia of colon without hemorrhage 11/10/2021    C. difficile colitis 02/15/2022     Past Medical History:   Diagnosis Date    Allergic rhinitis     Aortic valve insufficiency     Ascending aortic aneurysm     Aspiration pneumonia     Bacteremia     Calcific aortic stenosis of bicuspid valve     Cardiac arrest     Cataracts, bilateral     CKD (chronic kidney disease) stage 3, GFR 30-59 ml/min     Contact dermatitis due to poison ivy     Elbow fracture     GERD (gastroesophageal reflux disease)     GI bleed     H/O mechanical aortic valve replacement     Head injury     History of transfusion     Lumbosacral plexopathy     Nonischemic cardiomyopathy     Prostate cancer     Renal oncocytoma     Stroke (cerebrum)     Visual field defect          PAST SURGICAL HISTORY  Past Surgical History:   Procedure Laterality Date    AORTIC VALVE REPAIR/REPLACEMENT      ASCENDING AORTIC ANEURYSM REPAIR W/ MECHANICAL AORTIC VALVE REPLACEMENT      CHOLECYSTECTOMY WITH INTRAOPERATIVE CHOLANGIOGRAM N/A 03/29/2022    Procedure: Laparoscopic cholecystectomy with cholangiogram, possible open;  Surgeon:  Lisa Guidry MD;  Location: [facility] MAIN OR;  Service: General;  Laterality: N/A;    COLONOSCOPY N/A 10/28/2021    Procedure: COLONOSCOPY to cecum:  cold snare polyps,;  Surgeon: Dinesh Meza MD;  Location: [facility] ENDOSCOPY;  Service: Gastroenterology;  Laterality: N/A;  pre:  Iron deficiency anemia  post:  polyps, diverticulosis,     COLONOSCOPY N/A 2021    Procedure: COLONOSCOPY to cecum with APC cautery of AVM and clip placement x1;  Surgeon: Pavan Rodriguez MD;  Location: [facility] ENDOSCOPY;  Service: Gastroenterology;  Laterality: N/A;  PRE - gi bleed, anemia  POST - diverticulosis, poor prep, AVM right colon    ENDOSCOPY N/A 10/28/2021    Procedure: ESOPHAGOGASTRODUODENOSCOPY with biopsies;  Surgeon: Dinesh Meza MD;  Location: [facility] ENDOSCOPY;  Service: Gastroenterology;  Laterality: N/A;  pre:  Iron deficiency anemia  post:  duodenitis and gastritis    EYE SURGERY  2020    cataract surgery     NEPHRECTOMY      OTHER SURGICAL HISTORY      elbow surgery    OTHER SURGICAL HISTORY      right arm surgery    PROSTATE SURGERY      prostate removal    THORACENTESIS Left     diagnostic    UPPER GASTROINTESTINAL ENDOSCOPY           FAMILY HISTORY  Family History   Problem Relation Age of Onset    Colon cancer Mother     Cancer Mother         colon    Colon polyps Brother     Cancer Brother         colon    Crohn's disease Neg Hx     Irritable bowel syndrome Neg Hx     Ulcerative colitis Neg Hx          SOCIAL HISTORY  Social History     Socioeconomic History    Marital status:    Tobacco Use    Smoking status: Former     Current packs/day: 0.00     Average packs/day: 1 pack/day for 25.0 years (25.0 ttl pk-yrs)     Types: Cigarettes     Start date:      Quit date: 1970     Years since quittin.7     Passive exposure: Never    Smokeless tobacco: Former   Vaping Use    Vaping status: Never Used   Substance and Sexual Activity    Alcohol use: Yes     Alcohol/week: 0.0  - 1.0 standard drinks of alcohol    Drug use: Never    Sexual activity: Defer         ALLERGIES  Penicillins, Oxycodone-acetaminophen, Other, and Percocet [oxycodone-acetaminophen]    REVIEW OF SYSTEMS  Review of Systems  Included in HPI  All systems reviewed and negative except for those discussed in HPI.      PHYSICAL EXAM  ED Triage Vitals [08/31/24 1321]   Temp Heart Rate Resp BP SpO2   99.5 °F (37.5 °C) 74 18 -- 93 %      Temp src Heart Rate Source Patient Position BP Location FiO2 (%)   Tympanic -- -- -- --       Physical Exam      GENERAL: Awake alert.  Nontoxic-appearing elderly male.  Resting comfortably in no acute distress  HENT: NCAT, nares patent, oropharynx is benign  EYES: no scleral icterus  CV: regular rhythm, normal rate  RESPIRATORY: normal effort, clear to auscultation bilaterally  ABDOMEN: soft, nondistended, nontender  MUSCULOSKELETAL: Extremities are nontender with full range of motion.  No calf tenderness.  No pedal edema.  NEURO: Speech is normal.  No facial droop.  PSYCH:  calm, cooperative  SKIN: warm, dry    Vital signs and nursing notes reviewed.          LAB RESULTS  Recent Results (from the past 24 hour(s))   Comprehensive Metabolic Panel    Collection Time: 08/31/24  1:39 PM    Specimen: Blood   Result Value Ref Range    Glucose 109 (H) 65 - 99 mg/dL    BUN 32 (H) 8 - 23 mg/dL    Creatinine 1.60 (H) 0.76 - 1.27 mg/dL    Sodium 138 136 - 145 mmol/L    Potassium 4.4 3.5 - 5.2 mmol/L    Chloride 105 98 - 107 mmol/L    CO2 23.4 22.0 - 29.0 mmol/L    Calcium 8.5 (L) 8.6 - 10.5 mg/dL    Total Protein 6.7 6.0 - 8.5 g/dL    Albumin 3.6 3.5 - 5.2 g/dL    ALT (SGPT) 14 1 - 41 U/L    AST (SGOT) 13 1 - 40 U/L    Alkaline Phosphatase 104 39 - 117 U/L    Total Bilirubin 0.4 0.0 - 1.2 mg/dL    Globulin 3.1 gm/dL    A/G Ratio 1.2 g/dL    BUN/Creatinine Ratio 20.0 7.0 - 25.0    Anion Gap 9.6 5.0 - 15.0 mmol/L    eGFR 41.7 (L) >60.0 mL/min/1.73   Respiratory Panel PCR w/COVID-19(SARS-CoV-2)  CHRIS/ALEXANDRE/OG/PAD/COR/JAMILA In-House, NP Swab in UTM/VTM, 2 HR TAT - Swab, Nasopharynx    Collection Time: 08/31/24  1:39 PM    Specimen: Nasopharynx; Swab   Result Value Ref Range    ADENOVIRUS, PCR Not Detected Not Detected    Coronavirus 229E Not Detected Not Detected    Coronavirus HKU1 Not Detected Not Detected    Coronavirus NL63 Not Detected Not Detected    Coronavirus OC43 Not Detected Not Detected    COVID19 Not Detected Not Detected - Ref. Range    Human Metapneumovirus Not Detected Not Detected    Human Rhinovirus/Enterovirus Not Detected Not Detected    Influenza A PCR Not Detected Not Detected    Influenza B PCR Not Detected Not Detected    Parainfluenza Virus 1 Not Detected Not Detected    Parainfluenza Virus 2 Not Detected Not Detected    Parainfluenza Virus 3 Not Detected Not Detected    Parainfluenza Virus 4 Not Detected Not Detected    RSV, PCR Not Detected Not Detected    Bordetella pertussis pcr Not Detected Not Detected    Bordetella parapertussis PCR Not Detected Not Detected    Chlamydophila pneumoniae PCR Not Detected Not Detected    Mycoplasma pneumo by PCR Not Detected Not Detected   CBC Auto Differential    Collection Time: 08/31/24  1:39 PM    Specimen: Blood   Result Value Ref Range    WBC 7.72 3.40 - 10.80 10*3/mm3    RBC 5.24 4.14 - 5.80 10*6/mm3    Hemoglobin 13.9 13.0 - 17.7 g/dL    Hematocrit 44.0 37.5 - 51.0 %    MCV 84.0 79.0 - 97.0 fL    MCH 26.5 (L) 26.6 - 33.0 pg    MCHC 31.6 31.5 - 35.7 g/dL    RDW 15.4 12.3 - 15.4 %    RDW-SD 46.8 37.0 - 54.0 fl    MPV 11.5 6.0 - 12.0 fL    Platelets 153 140 - 450 10*3/mm3    Neutrophil % 78.4 (H) 42.7 - 76.0 %    Lymphocyte % 13.7 (L) 19.6 - 45.3 %    Monocyte % 5.8 5.0 - 12.0 %    Eosinophil % 1.4 0.3 - 6.2 %    Basophil % 0.4 0.0 - 1.5 %    Immature Grans % 0.3 0.0 - 0.5 %    Neutrophils, Absolute 6.05 1.70 - 7.00 10*3/mm3    Lymphocytes, Absolute 1.06 0.70 - 3.10 10*3/mm3    Monocytes, Absolute 0.45 0.10 - 0.90 10*3/mm3    Eosinophils,  Absolute 0.11 0.00 - 0.40 10*3/mm3    Basophils, Absolute 0.03 0.00 - 0.20 10*3/mm3    Immature Grans, Absolute 0.02 0.00 - 0.05 10*3/mm3    nRBC 0.0 0.0 - 0.2 /100 WBC   Red Top    Collection Time: 08/31/24  1:39 PM   Result Value Ref Range    Extra Tube Hold for add-ons.    Light Blue Top    Collection Time: 08/31/24  1:39 PM   Result Value Ref Range    Extra Tube Hold for add-ons.        Ordered the above labs and reviewed the results.        RADIOLOGY  XR Chest 1 View    Result Date: 8/31/2024  CHEST SINGLE VIEW  HISTORY: 88 years of age, Male.  Cough  COMPARISON: Two-view chest 02/09/2023, AP chest 02/01/2023  FINDINGS: Heart size is enlarged. Sternotomy wires are present. Moderate right and small left pleural effusions are present and there is associated basilar opacity, greatest in the right lung base where there is silhouetting of the right hemidiaphragm. Patchy opacities are present in the right mid to lower lung. No evidence for perihilar edema or pneumothorax.      1. Moderate right and small left pleural effusions. 2. Patchy right pulmonary opacities may be chronic, though patchy infiltrates are also possible. 3. No evidence for pulmonary edema.  This report was finalized on 8/31/2024 2:30 PM by Julian Forrester M.D on Workstation: [id number]       Ordered the above noted radiological studies. Reviewed by me in PACS.            PROCEDURES  Procedures        OUTPATIENT MEDICATION MANAGEMENT:  No current Epic-ordered facility-administered medications on file.     Current Outpatient Medications Ordered in Epic   Medication Sig Dispense Refill    atorvastatin (LIPITOR) 40 MG tablet TAKE 1 TABLET BY MOUTH DAILY 90 tablet 1    cetirizine (zyrTEC) 10 MG tablet Take 1 tablet by mouth Daily.      Continuous Glucose Sensor (FreeStyle Vance 14 Day Sensor) misc Apply 1 each topically Every 14 (Fourteen) Days. 6 each 3    digoxin (LANOXIN) 125 MCG tablet TAKE 1/2 TABLET BY MOUTH DAILY 45 tablet 1    donepezil  (Aricept) 5 MG tablet Take 1 tablet by mouth Every Night. 30 tablet 2    empagliflozin (Jardiance) 10 MG tablet tablet Take 1 tablet by mouth Daily. 30 tablet 2    famotidine (PEPCID) 20 MG tablet TAKE 1 TABLET BY MOUTH DAILY 90 tablet 1    ferrous gluconate (FERGON) 324 MG tablet TAKE 1 TABLET BY MOUTH EVERY OTHER DAY (Patient not taking: Reported on 8/23/2024) 45 tablet 1    Glucagon (Gvoke HypoPen 1-Pack) 1 MG/0.2ML solution auto-injector Inject 1 mg under the skin into the appropriate area as directed 1 (One) Time As Needed (for symptoms of low blood sugar. Report to ER immediately after use.) for up to 1 dose. 0.2 mL 1    guaifenesin-dextromethorphan 600-30 mg (MUCINEX DM)  MG tablet sustained-release 12 hour Take 1 tablet by mouth 2 (Two) Times a Day As Needed (Cough). 20 tablet 0    Insulin Disposable Pump (V-Go 40) 40 UNIT/24HR kit 1 each Daily. 30 kit 5    Insulin Lispro (humaLOG) 100 UNIT/ML injection INJECT IN V-GO 40  UNITS UNDER THE SKIN OF INSULIN DAILY 30 mL 5    magnesium oxide (MAG-OX) 400 MG tablet Take 1 tablet by mouth 2 (Two) Times a Day.      metoprolol succinate XL (TOPROL-XL) 25 MG 24 hr tablet Take 1 tablet by mouth Daily. 90 tablet 1    SITagliptin (JANUVIA) 25 MG tablet Take 1 tablet by mouth Daily. 30 tablet 3    vitamin D (ERGOCALCIFEROL) 1.25 MG (81621 UT) capsule capsule Take 1 capsule by mouth 1 (One) Time Per Week.      warfarin (COUMADIN) 5 MG tablet TAKE 1/2 TABLET BY MOUTH DAILY ON MON AND FRI AND TAKE TAKE 1 TABLET BY MOUTH DAILY ON ALL OTHER DAYS 80 tablet 1           MEDICATIONS GIVEN IN ER  Medications - No data to display                MEDICAL DECISION MAKING, PROGRESS, and CONSULTS    All labs have been independently reviewed by me.  All radiology studies have been reviewed by me and I have also reviewed the radiology report.   EKG's independently viewed and interpreted by me.  Discussion below represents my analysis of pertinent findings related to patient's  condition, differential diagnosis, treatment plan and final disposition.      Additional sources:    - Discussed/ obtained information from independent historians: Spouse at bedside    - External (non-ED) record review: Patient was last admitted here in January 2023 for right lower lobe pneumonia.  He saw his PCP on 8/23/2024 for routine follow-up for type 2 diabetes, chronic kidney disease, and mild vascular dementia INR was 3.9 yesterday.    -Prescription drug monitoring program review:     N/A    - Chronic or social conditions impacting patient care (Social Determinants of Health): None          Orders placed during this visit:  Orders Placed This Encounter   Procedures    Respiratory Panel PCR w/COVID-19(SARS-CoV-2) CHIRS/ALEXANDRE/OG/PAD/COR/JAMILA In-House, NP Swab in UTM/VTM, 2 HR TAT - Swab, Nasopharynx    XR Chest 1 View    Comprehensive Metabolic Panel    CBC Auto Differential    Brandon Draw    CBC & Differential    Red Top    Light Blue Top         Additional orders considered but not ordered:          Differential diagnosis includes, but is not limited to:    URI, bronchitis, pneumonia, allergies      Independent interpretation of labs, radiology studies, and discussions with consultants:  ED Course as of 08/31/24 1841   Sat Aug 31, 2024   1328 Temp: 99.5 °F (37.5 °C) [WH]   1328 Heart Rate: 74 [WH]   1328 Resp: 18 [WH]   1328 SpO2: 93 % [WH]   1328 Device (Oxygen Therapy): room air [WH]   1335 Patient presents to the ED complaining of a nonproductive cough for the past 2 days.  He is afebrile.  Oxygen saturation is currently 98% on room air.  Will obtain chest x-ray and labs for further evaluation.  Differential diagnosis includes but is not limited to: URI, bronchitis, pneumonia, allergies [WH]   1401 WBC: 7.72 [WH]   1401 Hemoglobin: 13.9 [WH]   1448 Glucose(!): 109 [WH]   1448 BUN(!): 32 [WH]   1448 1.93 four weeks ago [WH]   1448 Chest x-ray personally interpreted by me.  My personal trepidation is: Heart  size is normal.  Patchy opacities present in the right mid and lower lung.    Per the radiologist, there is moderate right and small left pleural effusion.  There are patchy right pulmonary opacities which may be chronic.  No pulmonary edema. [WH]   1556 Patient is resting and breathing comfortably.  Oxygen saturation is 99% on room air.  Test results and diagnoses were discussed with the patient and his wife.  He will be discharged with a prescription for cough medication.  Return precautions were discussed. [WH]   1607 MDM: Patient presented to the ED complaining of a nonproductive cough.  He was afebrile.  He was not hypoxic.  Chest x-ray was negative acute.  Labs were unremarkable.  Patient was breathing comfortably.  He will be discharged with cough medication.  Symptoms may be due to seasonal allergies. [WH]      ED Course User Index  [WH] Wesly Augustine MD         COMPLEXITY OF CARE        DIAGNOSIS  Final diagnoses:   Acute cough   Anticoagulated on warfarin         DISPOSITION  DISCHARGE    Patient discharged in stable condition.    Reviewed implications of results, diagnosis, meds, responsibility to follow up, warning signs and symptoms of possible worsening, potential complications and reasons to return to ER, including worsening/persistent symptoms, shortness of breath, fever, chest pain, or other concern.    Patient/Family voiced understanding of above instructions.    Discussed plan for discharge, as there is no emergent indication for admission. Patient referred to primary care provider for BP management due to today's BP. Pt/family is agreeable and understands need for follow up and repeat testing.  Pt is aware that discharge does not mean that nothing is wrong but it indicates no emergency is present that requires admission and they must continue care with follow-up as given below or physician of their choice.     FOLLOW-UP  Celestine English,   [address]  [id number] [phone]    Schedule an appointment as soon as possible for a visit   If symptoms persist         Medication List        New Prescriptions      guaifenesin-dextromethorphan 600-30 mg  MG tablet sustained-release 12 hour  Take 1 tablet by mouth 2 (Two) Times a Day As Needed (Cough).            Stop      guaiFENesin 600 MG 12 hr tablet  Commonly known as: MUCINEX               Where to Get Your Medications        These medications were sent to [facility] [id number] - [place], [place] - [address] AT [address] - [phone]  - [phone]   [address], [facility] [address]      Phone: [phone]   guaifenesin-dextromethorphan 600-30 mg  MG tablet sustained-release 12 hour                   Latest Documented Vital Signs:  AS OF 18:41 EDT VITALS:    BP - 128/66  HR - 58  TEMP - 99.5 °F (37.5 °C) (Tympanic)  O2 SATS - 97%            --    Please note that portions of this were completed with a voice recognition program.       Note Disclaimer: At [facility], we believe that sharing information builds trust and better relationships. You are receiving this note because you are receiving care at [facility] or recently visited. It is possible you will see health information before a provider has talked with you about it. This kind of information can be easy to misunderstand. To help you fully understand what it means for your health, we urge you to discuss this note with your provider.             Wesly Augustine MD  08/31/24 9033

## 2024-09-06 ENCOUNTER — ANTICOAGULATION VISIT (OUTPATIENT)
Dept: PHARMACY | Facility: HOSPITAL | Age: 89
End: 2024-09-06
Payer: COMMERCIAL

## 2024-09-06 DIAGNOSIS — I63.411 CEREBROVASCULAR ACCIDENT (CVA) DUE TO EMBOLISM OF RIGHT MIDDLE CEREBRAL ARTERY: ICD-10-CM

## 2024-09-06 DIAGNOSIS — Z95.4 HISTORY OF AORTIC VALVE REPLACEMENT WITH METALLIC VALVE: Primary | ICD-10-CM

## 2024-09-06 LAB — INR PPP: 5.2

## 2024-09-06 NOTE — PROGRESS NOTES
Anticoagulation Clinic Progress Note    Anticoagulation Summary  As of 2024      INR goal:  3.0-3.5   TTR:  64.9% (5.6 y)   INR used for dosin.20 (2024)   Warfarin maintenance plan:  2.5 mg every Mon, Wed, Fri; 5 mg all other days   Weekly warfarin total:  27.5 mg   Plan last modified:  Vasquez Niño, PharmD (2024)   Next INR check:  2024   Priority:  High   Target end date:  Indefinite    Indications    History of aortic valve replacement with metallic valve [Z95.4]  Atrial fibrillation [I48.91]  Cerebrovascular accident (CVA) due to 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]                 Anticoagulation Episode Summary       INR check location:      Preferred lab:      Send INR reminders to:  [facility]    Comments:  **Home testing training 3/3/23** *CALL EVERY TIME* New INR goal 3 - 3.5 (see 2020 hospitalization for CVA)          Anticoagulation Care Providers       Provider Role Specialty Phone number    Griffin Fried MD Referring Cardiology [phone]            Clinic Interview:  Patient Findings     Positives:  Change in health, Emergency department visit, Change in   medications, Change in diet/appetite    Negatives:  Signs/symptoms of thrombosis, Signs/symptoms of bleeding,   Laboratory test error suspected, Change in alcohol use, Change in   activity, Upcoming invasive procedure, Upcoming dental procedure, Missed   doses, Extra doses, Hospital admission, Bruising, Other complaints    Comments:  ED visit 24 for respiratory symptoms; he was prescribed   guaifenesin/dextromethorphan. Reports he has had a very low appetite over   the past week. Reports he vomited x 1-2 day. Reports weight loss.       Clinical Outcomes     Negatives:  Major bleeding event, Thromboembolic event,   Anticoagulation-related hospital admission, Anticoagulation-related ED   visit, Anticoagulation-related fatality    Comments:  ED visit 24 for respiratory symptoms;  he was prescribed   guaifenesin/dextromethorphan. Reports he has had a very low appetite over   the past week. Reports he vomited x 1-2 day. Reports weight loss.         INR History:      8/16/2024     8:18 AM 8/23/2024    12:00 AM 8/23/2024     8:00 AM 8/30/2024    12:00 AM 8/30/2024     9:38 AM 9/6/2024    12:00 AM 9/6/2024     8:37 AM   Anticoagulation Monitoring   INR 3.70  3.30  3.90  5.20   INR Date 8/16/2024 8/23/2024 8/30/2024 9/6/2024   INR Goal 3.0-3.5  3.0-3.5  3.0-3.5  3.0-3.5   Trend Same  Same  Same  Down   Last Week Total 30 mg  30 mg  30 mg  30 mg   Next Week Total 30 mg  30 mg  30 mg  25 mg   Sun 5 mg  5 mg  5 mg  5 mg   Mon 2.5 mg  2.5 mg  2.5 mg  -   Tue 5 mg  5 mg  5 mg  -   Wed 5 mg  5 mg  5 mg  -   Thu 5 mg  5 mg  5 mg  -   Fri 2.5 mg  2.5 mg  2.5 mg  Hold (9/6)   Sat 5 mg  5 mg  5 mg  5 mg   Historical INR  3.30      3.90      5.20        Visit Report  Report Report           This result is from an external source.       Plan:  1. INR is Supratherapeutic today- see above in Anticoagulation Summary.   Will instruct LAURA AMIN Hua to Change their warfarin regimen (HOLD today, then plan to decrease to 2.5 mg MWF, 5 mg all other days) - see above in Anticoagulation Summary.  2. Follow up in 3 days.   3. They have been instructed to call if any changes in medications, doses, concerns, etc. Patient expresses understanding and has no further questions at this time.    Vasquez Niño, PharmD

## 2024-09-10 ENCOUNTER — ANTICOAGULATION VISIT (OUTPATIENT)
Dept: PHARMACY | Facility: HOSPITAL | Age: 89
End: 2024-09-10
Payer: COMMERCIAL

## 2024-09-10 DIAGNOSIS — Z95.4 HISTORY OF AORTIC VALVE REPLACEMENT WITH METALLIC VALVE: Primary | ICD-10-CM

## 2024-09-10 DIAGNOSIS — I63.411 CEREBROVASCULAR ACCIDENT (CVA) DUE TO EMBOLISM OF RIGHT MIDDLE CEREBRAL ARTERY: ICD-10-CM

## 2024-09-10 DIAGNOSIS — Z79.4 TYPE 2 DIABETES MELLITUS WITH STAGE 3B CHRONIC KIDNEY DISEASE, WITH LONG-TERM CURRENT USE OF INSULIN: ICD-10-CM

## 2024-09-10 DIAGNOSIS — N18.32 TYPE 2 DIABETES MELLITUS WITH STAGE 3B CHRONIC KIDNEY DISEASE, WITH LONG-TERM CURRENT USE OF INSULIN: ICD-10-CM

## 2024-09-10 DIAGNOSIS — E11.22 TYPE 2 DIABETES MELLITUS WITH STAGE 3B CHRONIC KIDNEY DISEASE, WITH LONG-TERM CURRENT USE OF INSULIN: ICD-10-CM

## 2024-09-10 LAB — INR PPP: 3.5

## 2024-09-10 NOTE — PROGRESS NOTES
Anticoagulation Clinic Progress Note    Anticoagulation Summary  As of 9/10/2024      INR goal:  3.0-3.5   TTR:  64.7% (5.6 y)   INR used for dosing:  3.50 (9/10/2024)   Warfarin maintenance plan:  2.5 mg every Mon, Wed, Fri; 5 mg all other days   Weekly warfarin total:  27.5 mg   No change documented:  Carmen El, PharmD   Plan last modified:  Vasquez Niño PharmD (9/6/2024)   Next INR check:  9/13/2024   Priority:  High   Target end date:  Indefinite    Indications    History of aortic valve replacement with metallic valve [Z95.4]  Atrial fibrillation [I48.91]  Cerebrovascular accident (CVA) due to 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]                 Anticoagulation Episode Summary       INR check location:      Preferred lab:      Send INR reminders to:  [facility]    Comments:  **Home testing training 3/3/23** *CALL EVERY TIME* New INR goal 3 - 3.5 (see 1/2020 hospitalization for CVA)          Anticoagulation Care Providers       Provider Role Specialty Phone number    Griffin Fried MD Referring Cardiology [phone]            Clinic Interview:  Patient Findings     Negatives:  Signs/symptoms of thrombosis, Signs/symptoms of bleeding,   Laboratory test error suspected, Change in health, Change in alcohol use,   Change in activity, Upcoming invasive procedure, Emergency department   visit, Upcoming dental procedure, Missed doses, Extra doses, Change in   medications, Change in diet/appetite, Hospital admission, Bruising, Other   complaints    Comments:  Gladys reports she stopped the donepezil d/t diarrhea. Patient's   appetite is returning and no vomiting since last week.       Clinical Outcomes     Negatives:  Major bleeding event, Thromboembolic event,   Anticoagulation-related hospital admission, Anticoagulation-related ED   visit, Anticoagulation-related fatality    Comments:  Gladys reports she stopped the donepezil d/t diarrhea. Patient's   appetite is returning  and no vomiting since last week.         INR History:      8/23/2024     8:00 AM 8/30/2024    12:00 AM 8/30/2024     9:38 AM 9/6/2024    12:00 AM 9/6/2024     8:37 AM 9/10/2024    12:00 AM 9/10/2024     7:59 AM   Anticoagulation Monitoring   INR 3.30  3.90  5.20  3.50   INR Date 8/23/2024  8/30/2024  9/6/2024  9/10/2024   INR Goal 3.0-3.5  3.0-3.5  3.0-3.5  3.0-3.5   Trend Same  Same  Down  Same   Last Week Total 30 mg  30 mg  30 mg  27.5 mg   Next Week Total 30 mg  30 mg  25 mg  27.5 mg   Sun 5 mg  5 mg  5 mg  -   Mon 2.5 mg  2.5 mg  -  -   Tue 5 mg  5 mg  -  5 mg   Wed 5 mg  5 mg  -  2.5 mg   Thu 5 mg  5 mg  -  5 mg   Fri 2.5 mg  2.5 mg  Hold (9/6)  -   Sat 5 mg  5 mg  5 mg  -   Historical INR  3.90      5.20      3.50        Visit Report Report             This result is from an external source.       Plan:  1. INR is Therapeutic today- see above in Anticoagulation Summary.   Will instruct LAURA BRENNAN Sequeira to Continue their warfarin regimen- see above in Anticoagulation Summary.  Take 2.5 mg on Monday, Wednesday, Friday and 5 mg on all other days.  2. Follow up in 1 weeks  3. They have been instructed to call if any changes in medications, doses, concerns, etc. Patient expresses understanding and has no further questions at this time.    Carmen El, PharmD

## 2024-09-11 RX ORDER — SUB-Q INSULIN DEVICE, 40 UNIT
EACH MISCELLANEOUS
Qty: 30 KIT | Refills: 5 | Status: SHIPPED | OUTPATIENT
Start: 2024-09-11

## 2024-09-13 ENCOUNTER — ANTICOAGULATION VISIT (OUTPATIENT)
Dept: PHARMACY | Facility: HOSPITAL | Age: 89
End: 2024-09-13
Payer: COMMERCIAL

## 2024-09-13 DIAGNOSIS — Z95.4 HISTORY OF AORTIC VALVE REPLACEMENT WITH METALLIC VALVE: Primary | ICD-10-CM

## 2024-09-13 DIAGNOSIS — I63.411 CEREBROVASCULAR ACCIDENT (CVA) DUE TO EMBOLISM OF RIGHT MIDDLE CEREBRAL ARTERY: ICD-10-CM

## 2024-09-13 LAB — INR PPP: 2.9

## 2024-09-13 NOTE — PROGRESS NOTES
Anticoagulation Clinic Progress Note    Anticoagulation Summary  As of 2024      INR goal:  3.0-3.5   TTR:  64.8% (5.6 y)   INR used for dosin.90 (2024)   Warfarin maintenance plan:  2.5 mg every Mon, Wed, Fri; 5 mg all other days   Weekly warfarin total:  27.5 mg   No change documented:  Carmen El, PharmD   Plan last modified:  Vasquez Niño, PharmD (2024)   Next INR check:  2024   Priority:  High   Target end date:  Indefinite    Indications    History of aortic valve replacement with metallic valve [Z95.4]  Atrial fibrillation [I48.91]  Cerebrovascular accident (CVA) due to 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]                 Anticoagulation Episode Summary       INR check location:      Preferred lab:      Send INR reminders to:  [facility]    Comments:  **Home testing training 3/3/23** *CALL EVERY TIME* New INR goal 3 - 3.5 (see 2020 hospitalization for CVA)          Anticoagulation Care Providers       Provider Role Specialty Phone number    Griffin Fried MD Referring Cardiology [phone]            Clinic Interview:  Patient Findings     Negatives:  Signs/symptoms of thrombosis, Signs/symptoms of bleeding,   Laboratory test error suspected, Change in health, Change in alcohol use,   Change in activity, Upcoming invasive procedure, Emergency department   visit, Upcoming dental procedure, Missed doses, Extra doses, Change in   medications, Change in diet/appetite, Hospital admission, Bruising, Other   complaints      Clinical Outcomes     Negatives:  Major bleeding event, Thromboembolic event,   Anticoagulation-related hospital admission, Anticoagulation-related ED   visit, Anticoagulation-related fatality        INR History:      2024     9:38 AM 2024    12:00 AM 2024     8:37 AM 9/10/2024    12:00 AM 9/10/2024     7:59 AM 2024    12:00 AM 2024     8:53 AM   Anticoagulation Monitoring   INR 3.90  5.20  3.50  2.90    INR Date 8/30/2024  9/6/2024  9/10/2024  9/13/2024   INR Goal 3.0-3.5  3.0-3.5  3.0-3.5  3.0-3.5   Trend Same  Down  Same  Same   Last Week Total 30 mg  30 mg  27.5 mg  25 mg   Next Week Total 30 mg  25 mg  27.5 mg  27.5 mg   Sun 5 mg  5 mg  -  5 mg   Mon 2.5 mg  -  -  2.5 mg   Tue 5 mg  -  5 mg  5 mg   Wed 5 mg  -  2.5 mg  2.5 mg   Thu 5 mg  -  5 mg  5 mg   Fri 2.5 mg  Hold (9/6)  -  2.5 mg   Sat 5 mg  5 mg  -  5 mg   Historical INR  5.20      3.50      2.90            This result is from an external source.       Plan:  1. INR is Subtherapeutic today- see above in Anticoagulation Summary.   Will instruct LAURA Sequeira to Continue their warfarin regimen- see above in Anticoagulation Summary.  2. Follow up in 1 weeks  3. They have been instructed to call if any changes in medications, doses, concerns, etc. Patient expresses understanding and has no further questions at this time.    Carmen El, PharmD

## 2024-09-20 ENCOUNTER — ANTICOAGULATION VISIT (OUTPATIENT)
Dept: PHARMACY | Facility: HOSPITAL | Age: 89
End: 2024-09-20
Payer: COMMERCIAL

## 2024-09-20 DIAGNOSIS — Z95.4 HISTORY OF AORTIC VALVE REPLACEMENT WITH METALLIC VALVE: Primary | ICD-10-CM

## 2024-09-20 DIAGNOSIS — I63.411 CEREBROVASCULAR ACCIDENT (CVA) DUE TO EMBOLISM OF RIGHT MIDDLE CEREBRAL ARTERY: ICD-10-CM

## 2024-09-20 LAB — INR PPP: 2.3

## 2024-09-20 PROCEDURE — G0249 PROVIDE INR TEST MATER/EQUIP: HCPCS

## 2024-09-27 ENCOUNTER — ANTICOAGULATION VISIT (OUTPATIENT)
Dept: PHARMACY | Facility: HOSPITAL | Age: 89
End: 2024-09-27
Payer: COMMERCIAL

## 2024-09-27 DIAGNOSIS — I63.411 CEREBROVASCULAR ACCIDENT (CVA) DUE TO EMBOLISM OF RIGHT MIDDLE CEREBRAL ARTERY: ICD-10-CM

## 2024-09-27 DIAGNOSIS — Z95.4 HISTORY OF AORTIC VALVE REPLACEMENT WITH METALLIC VALVE: Primary | ICD-10-CM

## 2024-09-27 LAB — INR PPP: 2.8

## 2024-10-04 ENCOUNTER — ANTICOAGULATION VISIT (OUTPATIENT)
Dept: PHARMACY | Facility: HOSPITAL | Age: 89
End: 2024-10-04
Payer: COMMERCIAL

## 2024-10-04 DIAGNOSIS — Z95.4 HISTORY OF AORTIC VALVE REPLACEMENT WITH METALLIC VALVE: Primary | ICD-10-CM

## 2024-10-04 DIAGNOSIS — I63.411 CEREBROVASCULAR ACCIDENT (CVA) DUE TO EMBOLISM OF RIGHT MIDDLE CEREBRAL ARTERY: ICD-10-CM

## 2024-10-04 LAB — INR PPP: 3.1

## 2024-10-04 NOTE — PROGRESS NOTES
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    INR History:      9/13/2024     8:53 AM 9/20/2024    12:00 AM 9/20/2024     8:15 AM 9/27/2024    12:00 AM 9/27/2024     9:03 AM 10/4/2024    12:00 AM 10/4/2024     8:15 AM   Anticoagulation Monitoring   INR 2.90  2.30  2.80  3.10   INR Date 9/13/2024  9/20/2024  9/27/2024  10/4/2024   INR Goal 3.0-3.5  3.0-3.5  3.0-3.5  3.0-3.5   Trend Same  Same  Same  Up   Last Week Total 25 mg  27.5 mg  32.5 mg  30 mg   Next Week Total 27.5 mg  32.5 mg  27.5 mg  30 mg   Sun 5 mg  5 mg  5 mg  5 mg   Mon 2.5 mg  2.5 mg  2.5 mg  2.5 mg   Tue 5 mg  5 mg  5 mg  5 mg   Wed 2.5 mg  5 mg (9/25)  2.5 mg  5 mg   Thu 5 mg  5 mg  5 mg  5 mg   Fri 2.5 mg  5 mg (9/20)  2.5 mg  2.5 mg   Sat 5 mg  5 mg  5 mg  5 mg   Historical INR  2.30      2.80      3.10            This result is from an external source.       Plan:  1. INR is therapeutic today-  see above in Anticoagulation Summary.    LAURA Sequeira to continue their warfarin regimen- see above in Anticoagulation Summary.  2. Follow up in 1 week  3. They have been instructed to call if any changes in medications, doses, concerns, etc. Patient expresses understanding and has no further questions at this time.    Carmen El, PharmD

## 2024-10-11 ENCOUNTER — ANTICOAGULATION VISIT (OUTPATIENT)
Dept: PHARMACY | Facility: HOSPITAL | Age: 89
End: 2024-10-11
Payer: COMMERCIAL

## 2024-10-11 DIAGNOSIS — Z95.4 HISTORY OF AORTIC VALVE REPLACEMENT WITH METALLIC VALVE: Primary | ICD-10-CM

## 2024-10-11 DIAGNOSIS — I63.411 CEREBROVASCULAR ACCIDENT (CVA) DUE TO EMBOLISM OF RIGHT MIDDLE CEREBRAL ARTERY: ICD-10-CM

## 2024-10-11 LAB — INR PPP: 3.9

## 2024-10-11 NOTE — PROGRESS NOTES
Anticoagulation Clinic Progress Note    Anticoagulation Summary  As of 10/11/2024      INR goal:  3.0-3.5   TTR:  64.2% (5.7 y)   INR used for dosing:  3.90 (10/11/2024)   Warfarin maintenance plan:  2.5 mg every Mon, Fri; 5 mg all other days   Weekly warfarin total:  30 mg   No change documented:  Carmen El PharmD   Plan last modified:  Carmen El PharmD (10/4/2024)   Next INR check:  10/14/2024   Priority:  High   Target end date:  Indefinite    Indications    History of aortic valve replacement with metallic valve [Z95.4]  Atrial fibrillation [I48.91]  Cerebrovascular accident (CVA) due to 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]                 Anticoagulation Episode Summary       INR check location:      Preferred lab:      Send INR reminders to:  [facility]    Comments:  **Home testing training 3/3/23** *CALL EVERY TIME* New INR goal 3 - 3.5 (see 1/2020 hospitalization for CVA)          Anticoagulation Care Providers       Provider Role Specialty Phone number    Griffin Fried MD Referring Cardiology [phone]            Clinic Interview:  Patient Findings     Negatives:  Signs/symptoms of thrombosis, Signs/symptoms of bleeding,   Laboratory test error suspected, Change in health, Change in alcohol use,   Change in activity, Upcoming invasive procedure, Emergency department   visit, Upcoming dental procedure, Missed doses, Extra doses, Change in   medications, Change in diet/appetite, Hospital admission, Bruising, Other   complaints      Clinical Outcomes     Negatives:  Major bleeding event, Thromboembolic event,   Anticoagulation-related hospital admission, Anticoagulation-related ED   visit, Anticoagulation-related fatality        INR History:      9/20/2024     8:15 AM 9/27/2024    12:00 AM 9/27/2024     9:03 AM 10/4/2024    12:00 AM 10/4/2024     8:15 AM 10/11/2024    12:00 AM 10/11/2024     8:26 AM   Anticoagulation Monitoring   INR 2.30  2.80  3.10   3.90   INR Date 9/20/2024  9/27/2024  10/4/2024  10/11/2024   INR Goal 3.0-3.5  3.0-3.5  3.0-3.5  3.0-3.5   Trend Same  Same  Up  Same   Last Week Total 27.5 mg  32.5 mg  30 mg  30 mg   Next Week Total 32.5 mg  27.5 mg  30 mg  30 mg   Sun 5 mg  5 mg  5 mg  5 mg   Mon 2.5 mg  2.5 mg  2.5 mg  -   Tue 5 mg  5 mg  5 mg  -   Wed 5 mg (9/25)  2.5 mg  5 mg  -   Thu 5 mg  5 mg  5 mg  -   Fri 5 mg (9/20)  2.5 mg  2.5 mg  2.5 mg   Sat 5 mg  5 mg  5 mg  5 mg   Historical INR  2.80      3.10      3.90            This result is from an external source.       Plan:  1. INR is Supratherapeutic today- see above in Anticoagulation Summary.   Will instruct LAURA Sequeira to Continue their warfarin regimen- see above in Anticoagulation Summary.  2. Follow up in 1 weeks  3. They have been instructed to call if any changes in medications, doses, concerns, etc. Patient expresses understanding and has no further questions at this time.    Carmen El, PharmD

## 2024-10-14 ENCOUNTER — ANTICOAGULATION VISIT (OUTPATIENT)
Dept: PHARMACY | Facility: HOSPITAL | Age: 89
End: 2024-10-14
Payer: COMMERCIAL

## 2024-10-14 DIAGNOSIS — I63.411 CEREBROVASCULAR ACCIDENT (CVA) DUE TO EMBOLISM OF RIGHT MIDDLE CEREBRAL ARTERY: ICD-10-CM

## 2024-10-14 DIAGNOSIS — Z95.4 HISTORY OF AORTIC VALVE REPLACEMENT WITH METALLIC VALVE: Primary | ICD-10-CM

## 2024-10-14 LAB — INR PPP: 3.2

## 2024-10-14 NOTE — PROGRESS NOTES
Anticoagulation Clinic Progress Note    Anticoagulation Summary  As of 10/14/2024      INR goal:  3.0-3.5   TTR:  64.1% (5.7 y)   INR used for dosing:  3.20 (10/14/2024)   Warfarin maintenance plan:  2.5 mg every Mon, Fri; 5 mg all other days   Weekly warfarin total:  30 mg   No change documented:  Carmen El PharmD   Plan last modified:  Carmen El PharmD (10/4/2024)   Next INR check:  10/21/2024   Priority:  High   Target end date:  Indefinite    Indications    History of aortic valve replacement with metallic valve [Z95.4]  Atrial fibrillation [I48.91]  Cerebrovascular accident (CVA) due to 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]                 Anticoagulation Episode Summary       INR check location:  --    Preferred lab:  --    Send INR reminders to:  [facility]    Comments:  **Home testing training 3/3/23** *CALL EVERY TIME* New INR goal 3 - 3.5 (see 1/2020 hospitalization for CVA)          Anticoagulation Care Providers       Provider Role Specialty Phone number    Griffin Fried MD Referring Cardiology [phone]            Clinic Interview:  Patient Findings     Negatives:  Signs/symptoms of thrombosis, Signs/symptoms of bleeding,   Laboratory test error suspected, Change in health, Change in alcohol use,   Change in activity, Upcoming invasive procedure, Emergency department   visit, Upcoming dental procedure, Missed doses, Extra doses, Change in   medications, Change in diet/appetite, Hospital admission, Bruising, Other   complaints      Clinical Outcomes     Negatives:  Major bleeding event, Thromboembolic event,   Anticoagulation-related hospital admission, Anticoagulation-related ED   visit, Anticoagulation-related fatality        INR History:      9/27/2024     9:03 AM 10/4/2024    12:00 AM 10/4/2024     8:15 AM 10/11/2024    12:00 AM 10/11/2024     8:26 AM 10/14/2024    12:00 AM 10/14/2024     8:37 AM   Anticoagulation Monitoring   INR 2.80  3.10   3.90  3.20   INR Date 9/27/2024  10/4/2024  10/11/2024  10/14/2024   INR Goal 3.0-3.5  3.0-3.5  3.0-3.5  3.0-3.5   Trend Same  Up  Same  Same   Last Week Total 32.5 mg  30 mg  30 mg  30 mg   Next Week Total 27.5 mg  30 mg  30 mg  30 mg   Sun 5 mg  5 mg  5 mg  5 mg   Mon 2.5 mg  2.5 mg  -  2.5 mg   Tue 5 mg  5 mg  -  5 mg   Wed 2.5 mg  5 mg  -  5 mg   Thu 5 mg  5 mg  -  5 mg   Fri 2.5 mg  2.5 mg  2.5 mg  2.5 mg   Sat 5 mg  5 mg  5 mg  5 mg   Historical INR  3.10      3.90      3.20            This result is from an external source.       Plan:  1. INR is Therapeutic today- see above in Anticoagulation Summary.   Will instruct LAURA BRENNAN Sequeira to Continue their warfarin regimen- see above in Anticoagulation Summary.  2. Follow up in 4 days   3. They have been instructed to call if any changes in medications, doses, concerns, etc. Patient expresses understanding and has no further questions at this time.    Carmen El, PharmD

## 2024-10-18 ENCOUNTER — ANTICOAGULATION VISIT (OUTPATIENT)
Dept: PHARMACY | Facility: HOSPITAL | Age: 89
End: 2024-10-18
Payer: COMMERCIAL

## 2024-10-18 DIAGNOSIS — Z95.4 HISTORY OF AORTIC VALVE REPLACEMENT WITH METALLIC VALVE: Primary | ICD-10-CM

## 2024-10-18 DIAGNOSIS — I63.411 CEREBROVASCULAR ACCIDENT (CVA) DUE TO EMBOLISM OF RIGHT MIDDLE CEREBRAL ARTERY: ICD-10-CM

## 2024-10-18 LAB — INR PPP: 4

## 2024-10-18 PROCEDURE — G0249 PROVIDE INR TEST MATER/EQUIP: HCPCS

## 2024-10-18 NOTE — PROGRESS NOTES
Anticoagulation Clinic Progress Note    Anticoagulation Summary  As of 10/18/2024      INR goal:  3.0-3.5   TTR:  64.1% (5.7 y)   INR used for dosin.00 (10/18/2024)   Warfarin maintenance plan:  2.5 mg every Mon, Fri; 5 mg all other days   Weekly warfarin total:  30 mg   Plan last modified:  Camren El, PharmD (10/4/2024)   Next INR check:  10/25/2024   Priority:  High   Target end date:  Indefinite    Indications    History of aortic valve replacement with metallic valve [Z95.4]  Atrial fibrillation [I48.91]  Cerebrovascular accident (CVA) due to 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]                 Anticoagulation Episode Summary       INR check location:  --    Preferred lab:  --    Send INR reminders to:  [facility]    Comments:  **Home testing training 3/3/23** *CALL EVERY TIME* New INR goal 3 - 3.5 (see 2020 hospitalization for CVA)          Anticoagulation Care Providers       Provider Role Specialty Phone number    Griffin Fried MD Referring Cardiology [phone]            Clinic Interview:  Patient Findings     Negatives:  Signs/symptoms of thrombosis, Signs/symptoms of bleeding,   Laboratory test error suspected, Change in health, Change in alcohol use,   Change in activity, Upcoming invasive procedure, Emergency department   visit, Upcoming dental procedure, Missed doses, Extra doses, Change in   medications, Change in diet/appetite, Hospital admission, Bruising, Other   complaints      Clinical Outcomes     Negatives:  Major bleeding event, Thromboembolic event,   Anticoagulation-related hospital admission, Anticoagulation-related ED   visit, Anticoagulation-related fatality        INR History:      10/4/2024     8:15 AM 10/11/2024    12:00 AM 10/11/2024     8:26 AM 10/14/2024    12:00 AM 10/14/2024     8:37 AM 10/18/2024    12:00 AM 10/18/2024     8:30 AM   Anticoagulation Monitoring   INR 3.10  3.90  3.20  4.00   INR Date 10/4/2024  10/11/2024   10/14/2024  10/18/2024   INR Goal 3.0-3.5  3.0-3.5  3.0-3.5  3.0-3.5   Trend Up  Same  Same  Same   Last Week Total 30 mg  30 mg  30 mg  30 mg   Next Week Total 30 mg  30 mg  30 mg  27.5 mg   Sun 5 mg  5 mg  5 mg  5 mg   Mon 2.5 mg  -  2.5 mg  2.5 mg   Tue 5 mg  -  5 mg  5 mg   Wed 5 mg  -  5 mg  2.5 mg (10/23)   Thu 5 mg  -  5 mg  5 mg   Fri 2.5 mg  2.5 mg  2.5 mg  2.5 mg   Sat 5 mg  5 mg  5 mg  5 mg   Historical INR  3.90      3.20      4.00            This result is from an external source.       Plan:  1. INR is Supratherapeutic today- see above in Anticoagulation Summary.   Will instruct LAURA Sequeira to Change their warfarin regimen- see above in Anticoagulation Summary.  2. Follow up in 1 weeks  3. They have been instructed to call if any changes in medications, doses, concerns, etc. Patient expresses understanding and has no further questions at this time.    Carmen El, PharmD

## 2024-10-24 RX ORDER — EMPAGLIFLOZIN 10 MG/1
10 TABLET, FILM COATED ORAL DAILY
Qty: 30 TABLET | Refills: 2 | Status: SHIPPED | OUTPATIENT
Start: 2024-10-24

## 2024-10-25 ENCOUNTER — ANTICOAGULATION VISIT (OUTPATIENT)
Dept: PHARMACY | Facility: HOSPITAL | Age: 89
End: 2024-10-25
Payer: COMMERCIAL

## 2024-10-25 DIAGNOSIS — I63.411 CEREBROVASCULAR ACCIDENT (CVA) DUE TO EMBOLISM OF RIGHT MIDDLE CEREBRAL ARTERY: ICD-10-CM

## 2024-10-25 DIAGNOSIS — Z95.4 HISTORY OF AORTIC VALVE REPLACEMENT WITH METALLIC VALVE: Primary | ICD-10-CM

## 2024-10-25 LAB — INR PPP: 3.2

## 2024-10-25 NOTE — PROGRESS NOTES
Anticoagulation Clinic Progress Note    Anticoagulation Summary  As of 10/25/2024      INR goal:  3.0-3.5   TTR:  64.0% (5.8 y)   INR used for dosing:  3.20 (10/25/2024)   Warfarin maintenance plan:  2.5 mg every Mon, Wed, Fri; 5 mg all other days   Weekly warfarin total:  27.5 mg   Plan last modified:  Michelle Piña RPH (10/25/2024)   Next INR check:  11/1/2024   Priority:  High   Target end date:  Indefinite    Indications    History of aortic valve replacement with metallic valve [Z95.4]  Atrial fibrillation [I48.91]  Cerebrovascular accident (CVA) due to 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    INR History:      10/11/2024     8:26 AM 10/14/2024    12:00 AM 10/14/2024     8:37 AM 10/18/2024    12:00 AM 10/18/2024     8:30 AM 10/25/2024    12:00 AM 10/25/2024     8:21 AM   Anticoagulation Monitoring   INR 3.90  3.20  4.00  3.20   INR Date 10/11/2024  10/14/2024  10/18/2024  10/25/2024   INR Goal 3.0-3.5  3.0-3.5  3.0-3.5  3.0-3.5   Trend Same  Same  Same  Down   Last Week Total 30 mg  30 mg  30 mg  27.5 mg   Next Week Total 30 mg  30 mg  27.5 mg  27.5 mg   Sun 5 mg  5 mg  5 mg  5 mg   Mon -  2.5 mg  2.5 mg  2.5 mg   Tue -  5 mg  5 mg  5 mg   Wed -  5 mg  2.5 mg (10/23)  2.5 mg   Thu -  5 mg  5 mg  5 mg   Fri 2.5 mg  2.5 mg  2.5 mg  2.5 mg   Sat 5 mg  5 mg  5 mg  5 mg   Historical INR  3.20      4.00      3.20            This result is from an external source.       Plan:  1. INR is therapeutic today-  see above in Anticoagulation Summary.    LAURA Sequeira to continue their warfarin regimen- see above in Anticoagulation Summary.  2. Follow up in 1 week  3. They have been instructed to call if any changes in medications, doses, concerns, etc. Patient expresses understanding and has no further questions at this time.    Michelle Piña [facility]

## 2024-10-30 RX ORDER — ATORVASTATIN CALCIUM 40 MG/1
40 TABLET, FILM COATED ORAL DAILY
Qty: 90 TABLET | Refills: 1 | Status: SHIPPED | OUTPATIENT
Start: 2024-10-30

## 2024-10-31 ENCOUNTER — OFFICE VISIT (OUTPATIENT)
Dept: CARDIOLOGY | Facility: CLINIC | Age: 89
End: 2024-10-31
Payer: COMMERCIAL

## 2024-10-31 VITALS
WEIGHT: 145 LBS | HEIGHT: 72 IN | HEART RATE: 74 BPM | BODY MASS INDEX: 19.64 KG/M2 | DIASTOLIC BLOOD PRESSURE: 64 MMHG | SYSTOLIC BLOOD PRESSURE: 122 MMHG

## 2024-10-31 DIAGNOSIS — I48.21 PERMANENT ATRIAL FIBRILLATION: Primary | ICD-10-CM

## 2024-10-31 DIAGNOSIS — Z86.73 HISTORY OF CVA (CEREBROVASCULAR ACCIDENT): ICD-10-CM

## 2024-10-31 DIAGNOSIS — I10 PRIMARY HYPERTENSION: Chronic | ICD-10-CM

## 2024-10-31 PROCEDURE — 99214 OFFICE O/P EST MOD 30 MIN: CPT | Performed by: INTERNAL MEDICINE

## 2024-10-31 PROCEDURE — 93000 ELECTROCARDIOGRAM COMPLETE: CPT | Performed by: INTERNAL MEDICINE

## 2024-10-31 NOTE — PROGRESS NOTES
"      CARDIOLOGY    Griffin rFied MD    ENCOUNTER DATE:  10/31/2024    LAURA Sequeira / 88 y.o. / male        CHIEF COMPLAINT / REASON FOR OFFICE VISIT     Primary hypertension (09/01/2023 Follow up)  Atrial fibrillation    HISTORY OF PRESENT ILLNESS       HPI  LAURA Sequeira is a 88 y.o. male who presents today for reevaluation.  Overall he is doing well.  Patient remains in A-fib.  His heart rate is controlled on his ECG today clinically he is doing well.  Patient remains on Coumadin due to the fact he has failed other therapies.  We have been running his INR between 3-3.5 due to previously failures.  Clinically he is doing well no further episodes he looks great says he feels good.      The following portions of the patient's history were reviewed and updated as appropriate: allergies, current medications, past family history, past medical history, past social history, past surgical history and problem list.      VITAL SIGNS     Visit Vitals  /64 (BP Location: Left arm)   Pulse 74   Ht 182.9 cm (72\")   Wt 65.8 kg (145 lb)   BMI 19.67 kg/m²         Wt Readings from Last 3 Encounters:   10/31/24 65.8 kg (145 lb)   08/31/24 68 kg (150 lb)   08/23/24 67.1 kg (148 lb)     Body mass index is 19.67 kg/m².      REVIEW OF SYSTEMS   ROS        PHYSICAL EXAMINATION     Constitutional:       Appearance: Healthy appearance.   Pulmonary:      Effort: Pulmonary effort is normal.   Cardiovascular:      PMI at left midclavicular line. Normal rate. Irregularly irregular rhythm. Normal S1. Normal S2.       Murmurs: There is no murmur.      No gallop.  No click. No rub.   Pulses:     Intact distal pulses.   Edema:     Peripheral edema absent.   Neurological:      Mental Status: Alert.           REVIEWED DATA       ECG 12 Lead    Date/Time: 10/31/2024 11:44 AM  Performed by: Griffin Fried MD    Authorized by: Griffin Fried MD  Comparison: compared with previous ECG from 1/30/2023  Similar to previous " ECG  Rhythm: atrial fibrillation  Conduction: non-specific intraventricular conduction delay  Other findings: non-specific ST-T wave changes    Clinical impression: abnormal EKG          Cardiac Procedures:            ASSESSMENT & PLAN      Diagnosis Plan   1. Permanent atrial fibrillation        2. History of CVA (cerebrovascular accident)        3. Primary hypertension              SUMMARY/DISCUSSION  Chronic atrial fibrillation.  Patient has had a stroke before.  We run a little high in his INR due to previous failures.  Overall he is doing well with his regimen.  Hypertension blood pressure is excellent  Follow-up 1 year or sooner if issues.        MEDICATIONS         Discharge Medications            Accurate as of October 31, 2024 11:43 AM. If you have any questions, ask your nurse or doctor.                Continue These Medications        Instructions Start Date   atorvastatin 40 MG tablet  Commonly known as: LIPITOR   40 mg, Oral, Daily      cetirizine 10 MG tablet  Commonly known as: zyrTEC   10 mg, Daily      digoxin 125 MCG tablet  Commonly known as: LANOXIN   62.5 mcg, Oral, Daily      donepezil 5 MG tablet  Commonly known as: Aricept   5 mg, Oral, Nightly      famotidine 20 MG tablet  Commonly known as: PEPCID   20 mg, Oral, Daily      ferrous gluconate 324 MG tablet  Commonly known as: FERGON   324 mg, Oral, Every Other Day      FreeStyle Vance 14 Day Sensor misc   1 each, Apply externally, Every 14 Days      guaifenesin-dextromethorphan 600-30 mg  MG tablet sustained-release 12 hour   1 tablet, Oral, 2 Times Daily PRN      Gvoke HypoPen 1-Pack 1 MG/0.2ML solution auto-injector  Generic drug: Glucagon   1 mg, Subcutaneous, Once As Needed      Insulin Lispro 100 UNIT/ML injection  Commonly known as: humaLOG   INJECT IN V-GO 40  UNITS UNDER THE SKIN OF INSULIN DAILY      Jardiance 10 MG tablet tablet  Generic drug: empagliflozin   10 mg, Oral, Daily      magnesium oxide 400 MG  tablet  Commonly known as: MAG-OX   400 mg, 2 Times Daily      metoprolol succinate XL 25 MG 24 hr tablet  Commonly known as: TOPROL-XL   25 mg, Oral, Daily      SITagliptin 25 MG tablet  Commonly known as: JANUVIA   25 mg, Oral, Daily      V-Go 40 40 UNIT/24HR kit   1 - DAILY      vitamin D 1.25 MG (41636 UT) capsule capsule  Commonly known as: ERGOCALCIFEROL   50,000 Units, Weekly      warfarin 5 MG tablet  Commonly known as: COUMADIN   TAKE 1/2 TABLET BY MOUTH DAILY ON MON AND FRI AND TAKE TAKE 1 TABLET BY MOUTH DAILY ON ALL OTHER DAYS                   **Dragon Disclaimer:   Much of this encounter note is an electronic transcription/translation of spoken language to printed text. The electronic translation of spoken language may permit erroneous, or at times, nonsensical words or phrases to be inadvertently transcribed. Although I have reviewed the note for such errors, some may still exist.

## 2024-11-01 ENCOUNTER — ANTICOAGULATION VISIT (OUTPATIENT)
Dept: PHARMACY | Facility: HOSPITAL | Age: 89
End: 2024-11-01
Payer: COMMERCIAL

## 2024-11-01 DIAGNOSIS — Z95.4 HISTORY OF AORTIC VALVE REPLACEMENT WITH METALLIC VALVE: Primary | ICD-10-CM

## 2024-11-01 DIAGNOSIS — I63.411 CEREBROVASCULAR ACCIDENT (CVA) DUE TO EMBOLISM OF RIGHT MIDDLE CEREBRAL ARTERY: ICD-10-CM

## 2024-11-01 LAB — INR PPP: 2.3

## 2024-11-01 NOTE — PROGRESS NOTES
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]                 Anticoagulation Episode Summary       INR check location:  --    Preferred lab:  --    Send INR reminders to:  [facility]    Comments:  **Home testing training 3/3/23** *CALL EVERY TIME* New INR goal 3 - 3.5 (see 2020 hospitalization for CVA)          Anticoagulation Care Providers       Provider Role Specialty Phone number    Griffin Fried MD Referring Cardiology [phone]            Clinic Interview:  Patient Findings     Negatives:  Signs/symptoms of thrombosis, Signs/symptoms of bleeding,   Laboratory test error suspected, Change in health, Change in alcohol use,   Change in activity, Upcoming invasive procedure, Emergency department   visit, Upcoming dental procedure, Missed doses, Extra doses, Change in   medications, Change in diet/appetite, Hospital admission, Bruising, Other   complaints      Clinical Outcomes     Negatives:  Major bleeding event, Thromboembolic event,   Anticoagulation-related hospital admission, Anticoagulation-related ED   visit, Anticoagulation-related fatality        INR History:      10/14/2024     8:37 AM 10/18/2024    12:00 AM 10/18/2024     8:30 AM 10/25/2024    12:00 AM 10/25/2024     8:21 AM 2024    12:00 AM 2024     8:57 AM   Anticoagulation Monitoring   INR 3.20  4.00  3.20  2.30   INR Date 10/14/2024  10/18/2024   10/25/2024  11/1/2024   INR Goal 3.0-3.5  3.0-3.5  3.0-3.5  3.0-3.5   Trend Same  Same  Down  Same   Last Week Total 30 mg  30 mg  27.5 mg  27.5 mg   Next Week Total 30 mg  27.5 mg  27.5 mg  30 mg   Sun 5 mg  5 mg  5 mg  5 mg   Mon 2.5 mg  2.5 mg  2.5 mg  -   Tue 5 mg  5 mg  5 mg  -   Wed 5 mg  2.5 mg (10/23)  2.5 mg  -   Thu 5 mg  5 mg  5 mg  -   Fri 2.5 mg  2.5 mg  2.5 mg  5 mg (11/1)   Sat 5 mg  5 mg  5 mg  5 mg   Historical INR  4.00      3.20      2.30            This result is from an external source.       Plan:  1. INR is Subtherapeutic today- see above in Anticoagulation Summary.   Will instruct LAURA Sequeira to Change their warfarin regimen- see above in Anticoagulation Summary.  2. Follow up in 1 weeks  3. They have been instructed to call if any changes in medications, doses, concerns, etc. Patient expresses understanding and has no further questions at this time.    Carmen El, PharmD

## 2024-11-04 ENCOUNTER — ANTICOAGULATION VISIT (OUTPATIENT)
Dept: PHARMACY | Facility: HOSPITAL | Age: 89
End: 2024-11-04
Payer: COMMERCIAL

## 2024-11-04 DIAGNOSIS — Z95.4 HISTORY OF AORTIC VALVE REPLACEMENT WITH METALLIC VALVE: Primary | ICD-10-CM

## 2024-11-04 DIAGNOSIS — I63.411 CEREBROVASCULAR ACCIDENT (CVA) DUE TO EMBOLISM OF RIGHT MIDDLE CEREBRAL ARTERY: ICD-10-CM

## 2024-11-04 LAB — INR PPP: 4.2

## 2024-11-04 RX ORDER — EMPAGLIFLOZIN 10 MG/1
10 TABLET, FILM COATED ORAL DAILY
Qty: 90 TABLET | Refills: 0 | Status: SHIPPED | OUTPATIENT
Start: 2024-11-04

## 2024-11-04 NOTE — PROGRESS NOTES
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]                 Anticoagulation Episode Summary       INR check location:  --    Preferred lab:  --    Send INR reminders to:  [facility]    Comments:  **Home testing training 3/3/23** *CALL EVERY TIME* New INR goal 3 - 3.5 (see 2020 hospitalization for CVA)          Anticoagulation Care Providers       Provider Role Specialty Phone number    Griffin Fried MD Referring Cardiology [phone]            Clinic Interview:  Patient Findings     Negatives:  Signs/symptoms of thrombosis, Signs/symptoms of bleeding,   Laboratory test error suspected, Change in health, Change in alcohol use,   Change in activity, Upcoming invasive procedure, Emergency department   visit, Upcoming dental procedure, Missed doses, Extra doses, Change in   medications, Change in diet/appetite, Hospital admission, Bruising, Other   complaints    Comments:  Gladys reports patient only took 2.5 mg Friday instead of   recommended 5 mg.       Clinical Outcomes     Negatives:  Major bleeding event, Thromboembolic event,   Anticoagulation-related hospital admission, Anticoagulation-related ED   visit, Anticoagulation-related fatality    Comments:  Gladys reports patient only took 2.5 mg Friday instead of   recommended 5 mg.         INR History:      10/18/2024     8:30 AM 10/25/2024    12:00 AM 10/25/2024     8:21 AM  11/1/2024    12:00 AM 11/1/2024     8:57 AM 11/4/2024    12:00 AM 11/4/2024     8:45 AM   Anticoagulation Monitoring   INR 4.00  3.20  2.30  4.20   INR Date 10/18/2024  10/25/2024  11/1/2024  11/4/2024   INR Goal 3.0-3.5  3.0-3.5  3.0-3.5  3.0-3.5   Trend Same  Down  Same  Same   Last Week Total 30 mg  27.5 mg  27.5 mg  27.5 mg   Next Week Total 27.5 mg  27.5 mg  30 mg  27.5 mg   Sun 5 mg  5 mg  5 mg  -   Mon 2.5 mg  2.5 mg  -  2.5 mg   Tue 5 mg  5 mg  -  5 mg   Wed 2.5 mg (10/23)  2.5 mg  -  -   Thu 5 mg  5 mg  -  -   Fri 2.5 mg  2.5 mg  5 mg (11/1)  -   Sat 5 mg  5 mg  5 mg  -   Historical INR  3.20      2.30      4.20            This result is from an external source.       Plan:  1. INR is Supratherapeutic today- see above in Anticoagulation Summary.   Will instruct LAURA BRENNAN Sequeira to Continue their warfarin regimen- see above in Anticoagulation Summary.  2. Follow up in 2 days   3. They have been instructed to call if any changes in medications, doses, concerns, etc. Patient expresses understanding and has no further questions at this time.    Carmen El, PharmD

## 2024-11-06 ENCOUNTER — ANTICOAGULATION VISIT (OUTPATIENT)
Dept: PHARMACY | Facility: HOSPITAL | Age: 89
End: 2024-11-06
Payer: COMMERCIAL

## 2024-11-06 DIAGNOSIS — Z95.4 HISTORY OF AORTIC VALVE REPLACEMENT WITH METALLIC VALVE: Primary | ICD-10-CM

## 2024-11-06 DIAGNOSIS — I63.411 CEREBROVASCULAR ACCIDENT (CVA) DUE TO EMBOLISM OF RIGHT MIDDLE CEREBRAL ARTERY: ICD-10-CM

## 2024-11-06 LAB — INR PPP: 4.2

## 2024-11-06 RX ORDER — WARFARIN SODIUM 1 MG/1
TABLET ORAL
Qty: 60 TABLET | Refills: 1 | Status: SHIPPED | OUTPATIENT
Start: 2024-11-06

## 2024-11-06 NOTE — PROGRESS NOTES
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]                 Anticoagulation Episode Summary       INR check location:  --    Preferred lab:  --    Send INR reminders to:  [facility]    Comments:  **Home testing training 3/3/23** *CALL EVERY TIME* New INR goal 3 - 3.5 (see 2020 hospitalization for CVA)          Anticoagulation Care Providers       Provider Role Specialty Phone number    Griffin Fried MD Referring Cardiology [phone]            Clinic Interview:  Patient Findings     Negatives:  Signs/symptoms of thrombosis, Signs/symptoms of bleeding,   Laboratory test error suspected, Change in health, Change in alcohol use,   Change in activity, Upcoming invasive procedure, Emergency department   visit, Upcoming dental procedure, Missed doses, Extra doses, Change in   medications, Change in diet/appetite, Hospital admission, Bruising, Other   complaints      Clinical Outcomes     Negatives:  Major bleeding event, Thromboembolic event,   Anticoagulation-related hospital admission, Anticoagulation-related ED   visit, Anticoagulation-related fatality        INR History:      10/25/2024     8:21 AM 2024    12:00 AM 2024     8:57 AM 2024    12:00 AM 2024     8:45 AM 2024    12:00 AM 2024     8:49 AM   Anticoagulation Monitoring   INR 3.20  2.30  4.20  4.20   INR Date 10/25/2024  2024  2024   11/6/2024   INR Goal 3.0-3.5  3.0-3.5  3.0-3.5  3.0-3.5   Trend Down  Same  Same  Down   Last Week Total 27.5 mg  27.5 mg  27.5 mg  27.5 mg   Next Week Total 27.5 mg  30 mg  27.5 mg  24 mg   Sun 5 mg  5 mg  -  -   Mon 2.5 mg  -  2.5 mg  -   Tue 5 mg  -  5 mg  -   Wed 2.5 mg  -  -  Hold (11/6)   Thu 5 mg  -  -  5 mg   Fri 2.5 mg  5 mg (11/1)  -  -   Sat 5 mg  5 mg  -  -   Historical INR  2.30      4.20      4.20            This result is from an external source.       Plan:  1. INR is Supratherapeutic today- see above in Anticoagulation Summary.   Will instruct LAURA Sequeira to Change their warfarin regimen- see above in Anticoagulation Summary.  Hold today then reduce to 2 mg on Monday Wednesday and Friday and 5 mg on all other days. (Sending new script for 1 mg tablets to patient's pharmacy today)  2. Follow up in 2 days   3. They have been instructed to call if any changes in medications, doses, concerns, etc. Patient expresses understanding and has no further questions at this time.    Carmen El, PharmD

## 2024-11-08 ENCOUNTER — ANTICOAGULATION VISIT (OUTPATIENT)
Dept: PHARMACY | Facility: HOSPITAL | Age: 89
End: 2024-11-08
Payer: COMMERCIAL

## 2024-11-08 DIAGNOSIS — Z95.4 HISTORY OF AORTIC VALVE REPLACEMENT WITH METALLIC VALVE: Primary | ICD-10-CM

## 2024-11-08 DIAGNOSIS — I63.411 CEREBROVASCULAR ACCIDENT (CVA) DUE TO EMBOLISM OF RIGHT MIDDLE CEREBRAL ARTERY: ICD-10-CM

## 2024-11-08 LAB — INR PPP: 2.9

## 2024-11-08 NOTE — PROGRESS NOTES
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]                 Anticoagulation Episode Summary       INR check location:  --    Preferred lab:  --    Send INR reminders to:  [facility]    Comments:  **Home testing training 3/3/23** *CALL EVERY TIME* New INR goal 3 - 3.5 (see 2020 hospitalization for CVA)          Anticoagulation Care Providers       Provider Role Specialty Phone number    Griffin Fried MD Referring Cardiology [phone]            Clinic Interview:  Patient Findings     Negatives:  Signs/symptoms of thrombosis, Signs/symptoms of bleeding,   Laboratory test error suspected, Change in health, Change in alcohol use,   Change in activity, Upcoming invasive procedure, Emergency department   visit, Upcoming dental procedure, Missed doses, Extra doses, Change in   medications, Change in diet/appetite, Hospital admission, Bruising, Other   complaints      Clinical Outcomes     Negatives:  Major bleeding event, Thromboembolic event,   Anticoagulation-related hospital admission, Anticoagulation-related ED   visit, Anticoagulation-related fatality        INR History:      2024     8:57 AM 2024    12:00 AM 2024     8:45 AM 2024    12:00 AM 2024     8:49 AM 2024    12:00 AM 2024     8:22 AM   Anticoagulation Monitoring   INR 2.30  4.20  4.20  2.90   INR Date 2024   11/6/2024 11/8/2024   INR Goal 3.0-3.5  3.0-3.5  3.0-3.5  3.0-3.5   Trend Same  Same  Down  Up   Last Week Total 27.5 mg  27.5 mg  27.5 mg  25 mg   Next Week Total 30 mg  27.5 mg  24 mg  27.5 mg   Sun 5 mg  -  -  5 mg   Mon -  2.5 mg  -  2.5 mg   Tue -  5 mg  -  5 mg   Wed -  -  Hold (11/6)  2.5 mg   Thu -  -  5 mg  5 mg   Fri 5 mg (11/1)  -  -  2.5 mg   Sat 5 mg  -  -  5 mg   Historical INR  4.20      4.20      2.90            This result is from an external source.       Plan:  1. INR is Subtherapeutic today- see above in Anticoagulation Summary.   Will instruct LAURA Sequeira to Continue their warfarin regimen- see above in Anticoagulation Summary.  2. Follow up in 1 weeks  3. They have been instructed to call if any changes in medications, doses, concerns, etc. Patient expresses understanding and has no further questions at this time.    Carmen El, PharmD

## 2024-11-15 ENCOUNTER — ANTICOAGULATION VISIT (OUTPATIENT)
Dept: PHARMACY | Facility: HOSPITAL | Age: 89
End: 2024-11-15
Payer: COMMERCIAL

## 2024-11-15 DIAGNOSIS — I63.411 CEREBROVASCULAR ACCIDENT (CVA) DUE TO EMBOLISM OF RIGHT MIDDLE CEREBRAL ARTERY: ICD-10-CM

## 2024-11-15 DIAGNOSIS — Z95.4 HISTORY OF AORTIC VALVE REPLACEMENT WITH METALLIC VALVE: Primary | ICD-10-CM

## 2024-11-15 LAB — INR PPP: 2.9

## 2024-11-15 PROCEDURE — G0249 PROVIDE INR TEST MATER/EQUIP: HCPCS

## 2024-11-15 NOTE — PROGRESS NOTES
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]                 Anticoagulation Episode Summary       INR check location:  --    Preferred lab:  --    Send INR reminders to:  [facility]    Comments:  **Home testing training 3/3/23** *CALL EVERY TIME* New INR goal 3 - 3.5 (see 2020 hospitalization for CVA)          Anticoagulation Care Providers       Provider Role Specialty Phone number    Griffin Fried MD Referring Cardiology [phone]            Clinic Interview:  Patient Findings     Negatives:  Signs/symptoms of thrombosis, Signs/symptoms of bleeding,   Laboratory test error suspected, Change in health, Change in alcohol use,   Change in activity, Upcoming invasive procedure, Emergency department   visit, Upcoming dental procedure, Missed doses, Extra doses, Change in   medications, Change in diet/appetite, Hospital admission, Bruising, Other   complaints      Clinical Outcomes     Negatives:  Major bleeding event, Thromboembolic event,   Anticoagulation-related hospital admission, Anticoagulation-related ED   visit, Anticoagulation-related fatality        INR History:      2024     8:45 AM 2024    12:00 AM 2024     8:49 AM 2024    12:00 AM 2024     8:22 AM 11/15/2024    12:00 AM 11/15/2024     8:30 AM   Anticoagulation Monitoring   INR 4.20   4.20  2.90  2.90   INR Date 11/4/2024  11/6/2024  11/8/2024  11/15/2024   INR Goal 3.0-3.5  3.0-3.5  3.0-3.5  3.0-3.5   Trend Same  Down  Up  Same   Last Week Total 27.5 mg  27.5 mg  25 mg  27.5 mg   Next Week Total 27.5 mg  24 mg  27.5 mg  27.5 mg   Sun -  -  5 mg  5 mg   Mon 2.5 mg  -  2.5 mg  2.5 mg   Tue 5 mg  -  5 mg  5 mg   Wed -  Hold (11/6)  2.5 mg  2.5 mg   Thu -  5 mg  5 mg  5 mg   Fri -  -  2.5 mg  2.5 mg   Sat -  -  5 mg  5 mg   Historical INR  4.20      2.90      2.90            This result is from an external source.       Plan:  1. INR is Subtherapeutic today- see above in Anticoagulation Summary.   Will instruct LAURA Sequeira to Continue their warfarin regimen- see above in Anticoagulation Summary.  2. Follow up in 1 weeks  3. They have been instructed to call if any changes in medications, doses, concerns, etc. Patient expresses understanding and has no further questions at this time.    Carmen El, PharmD

## 2024-11-22 ENCOUNTER — ANTICOAGULATION VISIT (OUTPATIENT)
Dept: PHARMACY | Facility: HOSPITAL | Age: 89
End: 2024-11-22
Payer: COMMERCIAL

## 2024-11-22 DIAGNOSIS — Z95.4 HISTORY OF AORTIC VALVE REPLACEMENT WITH METALLIC VALVE: Primary | ICD-10-CM

## 2024-11-22 DIAGNOSIS — I63.411 CEREBROVASCULAR ACCIDENT (CVA) DUE TO EMBOLISM OF RIGHT MIDDLE CEREBRAL ARTERY: ICD-10-CM

## 2024-11-22 LAB — INR PPP: 4.5

## 2024-11-22 NOTE — PROGRESS NOTES
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]                 Anticoagulation Episode Summary       INR check location:  --    Preferred lab:  --    Send INR reminders to:  [facility]    Comments:  **Home testing training 3/3/23** *CALL EVERY TIME* New INR goal 3 - 3.5 (see 2020 hospitalization for CVA)          Anticoagulation Care Providers       Provider Role Specialty Phone number    Griffin Fried MD Referring Cardiology [phone]            Clinic Interview:  Patient Findings     Positives:  Extra doses, Change in diet/appetite    Negatives:  Signs/symptoms of thrombosis, Signs/symptoms of bleeding,   Laboratory test error suspected, Change in health, Change in alcohol use,   Change in activity, Upcoming invasive procedure, Emergency department   visit, Upcoming dental procedure, Missed doses, Change in medications,   Hospital admission, Bruising, Other complaints    Comments:  Possibly less vit k in diet than usual this past week. Reports   pineapple recently. Reports he accidentally took warfarin at dose of 2.5   mg MF, 5 mg all other days.      Clinical Outcomes     Negatives:  Major bleeding event, Thromboembolic event,   Anticoagulation-related hospital admission, Anticoagulation-related ED   visit, Anticoagulation-related fatality    Comments:  Possibly less vit k in diet than usual this past week. Reports    pineapple recently. Reports he accidentally took warfarin at dose of 2.5   mg MF, 5 mg all other days.        INR History:      11/6/2024     8:49 AM 11/8/2024    12:00 AM 11/8/2024     8:22 AM 11/15/2024    12:00 AM 11/15/2024     8:30 AM 11/22/2024    12:00 AM 11/22/2024     8:47 AM   Anticoagulation Monitoring   INR 4.20  2.90  2.90  4.50   INR Date 11/6/2024  11/8/2024  11/15/2024  11/22/2024   INR Goal 3.0-3.5  3.0-3.5  3.0-3.5  3.0-3.5   Trend Down  Up  Same  Same   Last Week Total 27.5 mg  25 mg  27.5 mg  30 mg   Next Week Total 24 mg  27.5 mg  27.5 mg  25 mg   Sun -  5 mg  5 mg  5 mg   Mon -  2.5 mg  2.5 mg  2.5 mg   Tue -  5 mg  5 mg  5 mg   Wed Hold (11/6)  2.5 mg  2.5 mg  -   Thu 5 mg  5 mg  5 mg  -   Fri -  2.5 mg  2.5 mg  Hold (11/22)   Sat -  5 mg  5 mg  5 mg   Historical INR  2.90      2.90      4.50            This result is from an external source.       Plan:  1. INR is Supratherapeutic today- see above in Anticoagulation Summary.   Will instruct LAURA AMIN Sequeira to Change their warfarin regimen (HOLD 2.5-mg dose today, then decrease to 2.5 mg MWF, 5 mg all other days) - see above in Anticoagulation Summary.  2. Follow up in 5 days.   3. They have been instructed to call if any changes in medications, doses, concerns, etc. Patient expresses understanding and has no further questions at this time.    Vasquez Niño, PharmD

## 2024-11-23 DIAGNOSIS — F01.A0 MILD VASCULAR DEMENTIA WITHOUT BEHAVIORAL DISTURBANCE, PSYCHOTIC DISTURBANCE, MOOD DISTURBANCE, OR ANXIETY: ICD-10-CM

## 2024-11-25 RX ORDER — DONEPEZIL HYDROCHLORIDE 5 MG/1
5 TABLET, FILM COATED ORAL NIGHTLY
Qty: 90 TABLET | Refills: 0 | Status: SHIPPED | OUTPATIENT
Start: 2024-11-25

## 2024-11-27 ENCOUNTER — ANTICOAGULATION VISIT (OUTPATIENT)
Dept: PHARMACY | Facility: HOSPITAL | Age: 89
End: 2024-11-27
Payer: COMMERCIAL

## 2024-11-27 DIAGNOSIS — I63.411 CEREBROVASCULAR ACCIDENT (CVA) DUE TO EMBOLISM OF RIGHT MIDDLE CEREBRAL ARTERY: ICD-10-CM

## 2024-11-27 DIAGNOSIS — Z95.4 HISTORY OF AORTIC VALVE REPLACEMENT WITH METALLIC VALVE: Primary | ICD-10-CM

## 2024-11-27 LAB — INR PPP: 3.9

## 2024-11-27 NOTE — PROGRESS NOTES
Anticoagulation Clinic Progress Note    Anticoagulation Summary  As of 11/27/2024      INR goal:  3.0-3.5   TTR:  63.3% (5.9 y)   INR used for dosing:  3.90 (11/27/2024)   Warfarin maintenance plan:  2.5 mg every Mon, Wed, Fri; 5 mg all other days   Weekly warfarin total:  27.5 mg   Plan last modified:  Carmen El, PharmD (11/8/2024)   Next INR check:  12/3/2024   Priority:  High   Target end date:  Indefinite    Indications    History of aortic valve replacement with metallic valve [Z95.4]  Atrial fibrillation [I48.91]  Cerebrovascular accident (CVA) due to 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]                 Anticoagulation Episode Summary       INR check location:  --    Preferred lab:  --    Send INR reminders to:  [facility]    Comments:  **Home testing training 3/3/23** *CALL EVERY TIME* New INR goal 3 - 3.5 (see 1/2020 hospitalization for CVA)          Anticoagulation Care Providers       Provider Role Specialty Phone number    Griffin Fried MD Referring Cardiology [phone]            Clinic Interview:  Patient Findings     Negatives:  Signs/symptoms of thrombosis, Signs/symptoms of bleeding,   Laboratory test error suspected, Change in health, Change in alcohol use,   Change in activity, Upcoming invasive procedure, Emergency department   visit, Upcoming dental procedure, Missed doses, Extra doses, Change in   medications, Change in diet/appetite, Hospital admission, Bruising, Other   complaints      Clinical Outcomes     Negatives:  Major bleeding event, Thromboembolic event,   Anticoagulation-related hospital admission, Anticoagulation-related ED   visit, Anticoagulation-related fatality        INR History:      11/8/2024     8:22 AM 11/15/2024    12:00 AM 11/15/2024     8:30 AM 11/22/2024    12:00 AM 11/22/2024     8:47 AM 11/27/2024    12:00 AM 11/27/2024     8:56 AM   Anticoagulation Monitoring   INR 2.90  2.90  4.50  3.90   INR Date 11/8/2024  11/15/2024   11/22/2024 11/27/2024   INR Goal 3.0-3.5  3.0-3.5  3.0-3.5  3.0-3.5   Trend Up  Same  Same  Same   Last Week Total 25 mg  27.5 mg  30 mg  27.5 mg   Next Week Total 27.5 mg  27.5 mg  25 mg  25 mg   Sun 5 mg  5 mg  5 mg  5 mg   Mon 2.5 mg  2.5 mg  2.5 mg  2.5 mg   Tue 5 mg  5 mg  5 mg  -   Wed 2.5 mg  2.5 mg  -  2.5 mg   Thu 5 mg  5 mg  -  2.5 mg (11/28)   Fri 2.5 mg  2.5 mg  Hold (11/22)  2.5 mg   Sat 5 mg  5 mg  5 mg  5 mg   Historical INR  2.90      4.50      3.90            This result is from an external source.       Plan:  1. INR is Supratherapeutic today- see above in Anticoagulation Summary.   Will instruct LAURA Sequeira to Change their warfarin regimen- see above in Anticoagulation Summary.  Pineapple is consistent in his diet. Less vitamin k (celery (Partial to 2.5 mg tomorrow and then resume 2.5 mg MWF, 5 mg AOD, rck 1 week   2. Follow up on Monday and Tuesday   3. They have been instructed to call if any changes in medications, doses, concerns, etc. Patient expresses understanding and has no further questions at this time.    Michelle Piña [facility]

## 2024-12-02 ENCOUNTER — ANTICOAGULATION VISIT (OUTPATIENT)
Dept: PHARMACY | Facility: HOSPITAL | Age: 89
End: 2024-12-02
Payer: COMMERCIAL

## 2024-12-02 DIAGNOSIS — I63.411 CEREBROVASCULAR ACCIDENT (CVA) DUE TO EMBOLISM OF RIGHT MIDDLE CEREBRAL ARTERY: ICD-10-CM

## 2024-12-02 DIAGNOSIS — Z95.4 HISTORY OF AORTIC VALVE REPLACEMENT WITH METALLIC VALVE: Primary | ICD-10-CM

## 2024-12-02 LAB — INR PPP: 3.4

## 2024-12-02 NOTE — PROGRESS NOTES
Anticoagulation Clinic Progress Note    Anticoagulation Summary  As of 12/2/2024      INR goal:  3.0-3.5   TTR:  63.2% (5.9 y)   INR used for dosing:  3.40 (12/2/2024)   Warfarin maintenance plan:  2.5 mg every Mon, Wed, Fri; 5 mg all other days   Weekly warfarin total:  27.5 mg   No change documented:  Carmen El PharmD   Plan last modified:  Carmen El PharmD (11/8/2024)   Next INR check:  12/6/2024   Priority:  High   Target end date:  Indefinite    Indications    History of aortic valve replacement with metallic valve [Z95.4]  Atrial fibrillation [I48.91]  Cerebrovascular accident (CVA) due to 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]                 Anticoagulation Episode Summary       INR check location:  --    Preferred lab:  --    Send INR reminders to:  [facility]    Comments:  **Home testing training 3/3/23** *CALL EVERY TIME* New INR goal 3 - 3.5 (see 1/2020 hospitalization for CVA)          Anticoagulation Care Providers       Provider Role Specialty Phone number    Griffin Fried MD Referring Cardiology [phone]            Clinic Interview:  Patient Findings     Negatives:  Signs/symptoms of thrombosis, Signs/symptoms of bleeding,   Laboratory test error suspected, Change in health, Change in alcohol use,   Change in activity, Upcoming invasive procedure, Emergency department   visit, Upcoming dental procedure, Missed doses, Extra doses, Change in   medications, Change in diet/appetite, Hospital admission, Bruising, Other   complaints      Clinical Outcomes     Negatives:  Major bleeding event, Thromboembolic event,   Anticoagulation-related hospital admission, Anticoagulation-related ED   visit, Anticoagulation-related fatality        INR History:      11/15/2024     8:30 AM 11/22/2024    12:00 AM 11/22/2024     8:47 AM 11/27/2024    12:00 AM 11/27/2024     8:56 AM 12/2/2024    12:00 AM 12/2/2024     9:22 AM   Anticoagulation Monitoring   INR 2.90   4.50  3.90  3.40   INR Date 11/15/2024  11/22/2024  11/27/2024  12/2/2024   INR Goal 3.0-3.5  3.0-3.5  3.0-3.5  3.0-3.5   Trend Same  Same  Same  Same   Last Week Total 27.5 mg  30 mg  27.5 mg  25 mg   Next Week Total 27.5 mg  25 mg  25 mg  27.5 mg   Sun 5 mg  5 mg  5 mg  -   Mon 2.5 mg  2.5 mg  2.5 mg  2.5 mg   Tue 5 mg  5 mg  -  5 mg   Wed 2.5 mg  -  2.5 mg  2.5 mg   Thu 5 mg  -  2.5 mg (11/28)  5 mg   Fri 2.5 mg  Hold (11/22)  2.5 mg  -   Sat 5 mg  5 mg  5 mg  -   Historical INR  4.50      3.90      3.40            This result is from an external source.       Plan:  1. INR is Therapeutic today- see above in Anticoagulation Summary.   Will instruct LAURA Sequeira to Continue their warfarin regimen- see above in Anticoagulation Summary.  2. Follow up in 1 weeks  3. They have been instructed to call if any changes in medications, doses, concerns, etc. Patient expresses understanding and has no further questions at this time.    Carmen El, PharmD

## 2024-12-06 ENCOUNTER — ANTICOAGULATION VISIT (OUTPATIENT)
Dept: PHARMACY | Facility: HOSPITAL | Age: 89
End: 2024-12-06
Payer: COMMERCIAL

## 2024-12-06 DIAGNOSIS — Z95.4 HISTORY OF AORTIC VALVE REPLACEMENT WITH METALLIC VALVE: Primary | ICD-10-CM

## 2024-12-06 DIAGNOSIS — I63.411 CEREBROVASCULAR ACCIDENT (CVA) DUE TO EMBOLISM OF RIGHT MIDDLE CEREBRAL ARTERY: ICD-10-CM

## 2024-12-06 LAB — INR PPP: 3.8

## 2024-12-06 NOTE — PROGRESS NOTES
Anticoagulation Clinic Progress Note    Anticoagulation Summary  As of 12/6/2024      INR goal:  3.0-3.5   TTR:  63.1% (5.9 y)   INR used for dosing:  3.80 (12/6/2024)   Warfarin maintenance plan:  2.5 mg every Mon, Wed, Fri; 5 mg all other days   Weekly warfarin total:  27.5 mg   No change documented:  Carmen El PharmD   Plan last modified:  Carmen El PharmD (11/8/2024)   Next INR check:  12/13/2024   Priority:  High   Target end date:  Indefinite    Indications    History of aortic valve replacement with metallic valve [Z95.4]  Atrial fibrillation [I48.91]  Cerebrovascular accident (CVA) due to 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]                 Anticoagulation Episode Summary       INR check location:  --    Preferred lab:  --    Send INR reminders to:  [facility]    Comments:  **Home testing training 3/3/23** *CALL EVERY TIME* New INR goal 3 - 3.5 (see 1/2020 hospitalization for CVA)          Anticoagulation Care Providers       Provider Role Specialty Phone number    Griffin Fried MD Referring Cardiology [phone]            Clinic Interview:  Patient Findings     Negatives:  Signs/symptoms of thrombosis, Signs/symptoms of bleeding,   Laboratory test error suspected, Change in health, Change in alcohol use,   Change in activity, Upcoming invasive procedure, Emergency department   visit, Upcoming dental procedure, Missed doses, Extra doses, Change in   medications, Change in diet/appetite, Hospital admission, Bruising, Other   complaints      Clinical Outcomes     Negatives:  Major bleeding event, Thromboembolic event,   Anticoagulation-related hospital admission, Anticoagulation-related ED   visit, Anticoagulation-related fatality        INR History:      11/22/2024     8:47 AM 11/27/2024    12:00 AM 11/27/2024     8:56 AM 12/2/2024    12:00 AM 12/2/2024     9:22 AM 12/6/2024    12:00 AM 12/6/2024     8:31 AM   Anticoagulation Monitoring   INR 4.50  3.90   3.40  3.80   INR Date 11/22/2024 11/27/2024 12/2/2024 12/6/2024   INR Goal 3.0-3.5  3.0-3.5  3.0-3.5  3.0-3.5   Trend Same  Same  Same  Same   Last Week Total 30 mg  27.5 mg  25 mg  27.5 mg   Next Week Total 25 mg  25 mg  27.5 mg  27.5 mg   Sun 5 mg  5 mg  -  5 mg   Mon 2.5 mg  2.5 mg  2.5 mg  2.5 mg   Tue 5 mg  -  5 mg  5 mg   Wed -  2.5 mg  2.5 mg  2.5 mg   Thu -  2.5 mg (11/28)  5 mg  5 mg   Fri Hold (11/22)  2.5 mg  -  2.5 mg   Sat 5 mg  5 mg  -  5 mg   Historical INR  3.90      3.40      3.80            This result is from an external source.       Plan:  1. INR is Supratherapeutic today- see above in Anticoagulation Summary.   Will instruct LAURA Sequeira to Continue their warfarin regimen- see above in Anticoagulation Summary.  2. Follow up in 1 weeks  3. They have been instructed to call if any changes in medications, doses, concerns, etc. Patient expresses understanding and has no further questions at this time.    Carmen El, PharmD

## 2024-12-12 DIAGNOSIS — I48.20 CHRONIC ATRIAL FIBRILLATION: ICD-10-CM

## 2024-12-13 ENCOUNTER — OFFICE VISIT (OUTPATIENT)
Dept: INTERNAL MEDICINE | Facility: CLINIC | Age: 89
End: 2024-12-13
Payer: COMMERCIAL

## 2024-12-13 ENCOUNTER — ANTICOAGULATION VISIT (OUTPATIENT)
Dept: PHARMACY | Facility: HOSPITAL | Age: 89
End: 2024-12-13
Payer: COMMERCIAL

## 2024-12-13 VITALS
WEIGHT: 143 LBS | HEIGHT: 72 IN | TEMPERATURE: 97.6 F | DIASTOLIC BLOOD PRESSURE: 70 MMHG | BODY MASS INDEX: 19.37 KG/M2 | SYSTOLIC BLOOD PRESSURE: 130 MMHG | OXYGEN SATURATION: 97 % | HEART RATE: 63 BPM

## 2024-12-13 DIAGNOSIS — E11.22 TYPE 2 DIABETES MELLITUS WITH STAGE 3B CHRONIC KIDNEY DISEASE, WITH LONG-TERM CURRENT USE OF INSULIN: ICD-10-CM

## 2024-12-13 DIAGNOSIS — I10 ESSENTIAL HYPERTENSION: ICD-10-CM

## 2024-12-13 DIAGNOSIS — I48.20 CHRONIC ATRIAL FIBRILLATION: ICD-10-CM

## 2024-12-13 DIAGNOSIS — E11.65 TYPE 2 DIABETES MELLITUS WITH HYPERGLYCEMIA, WITH LONG-TERM CURRENT USE OF INSULIN: Primary | ICD-10-CM

## 2024-12-13 DIAGNOSIS — Z79.4 TYPE 2 DIABETES MELLITUS WITH STAGE 3B CHRONIC KIDNEY DISEASE, WITH LONG-TERM CURRENT USE OF INSULIN: ICD-10-CM

## 2024-12-13 DIAGNOSIS — Z95.4 HISTORY OF AORTIC VALVE REPLACEMENT WITH METALLIC VALVE: Primary | ICD-10-CM

## 2024-12-13 DIAGNOSIS — Z79.4 TYPE 2 DIABETES MELLITUS WITH HYPERGLYCEMIA, WITH LONG-TERM CURRENT USE OF INSULIN: Primary | ICD-10-CM

## 2024-12-13 DIAGNOSIS — N18.32 TYPE 2 DIABETES MELLITUS WITH STAGE 3B CHRONIC KIDNEY DISEASE, WITH LONG-TERM CURRENT USE OF INSULIN: ICD-10-CM

## 2024-12-13 DIAGNOSIS — I63.411 CEREBROVASCULAR ACCIDENT (CVA) DUE TO EMBOLISM OF RIGHT MIDDLE CEREBRAL ARTERY: ICD-10-CM

## 2024-12-13 LAB
BUN SERPL-MCNC: 36 MG/DL (ref 8–23)
BUN/CREAT SERPL: 22 (ref 7–25)
CALCIUM SERPL-MCNC: 9.4 MG/DL (ref 8.6–10.5)
CHLORIDE SERPL-SCNC: 104 MMOL/L (ref 98–107)
CO2 SERPL-SCNC: 26.3 MMOL/L (ref 22–29)
CREAT SERPL-MCNC: 1.64 MG/DL (ref 0.76–1.27)
EGFRCR SERPLBLD CKD-EPI 2021: 40.2 ML/MIN/1.73
GLUCOSE SERPL-MCNC: 136 MG/DL (ref 65–99)
HBA1C MFR BLD: 8.2 % (ref 4.8–5.6)
INR PPP: 3.5
POTASSIUM SERPL-SCNC: 6 MMOL/L (ref 3.5–5.2)
SODIUM SERPL-SCNC: 139 MMOL/L (ref 136–145)

## 2024-12-13 PROCEDURE — G0249 PROVIDE INR TEST MATER/EQUIP: HCPCS

## 2024-12-13 PROCEDURE — 99214 OFFICE O/P EST MOD 30 MIN: CPT | Performed by: STUDENT IN AN ORGANIZED HEALTH CARE EDUCATION/TRAINING PROGRAM

## 2024-12-13 NOTE — PROGRESS NOTES
"    Chief Complaint  Diabetes    SUBJECTIVE    History of Present Illness    LAURA Sequeira is a 89 y.o. male who presents to the office today as an established patient that last saw me on 8/23/2024.     Type 2 diabetes/CKD 3b and microalbuminuria: still using VGO 40. Insurance did not cover Farxiga so now on Jardiance (empagliflozin) 10 mg daily. Higher dosages of Jardiance are being avoided due to dehydration. After last visit sitagliptin 25 mg daily was recommended but wife states they never started it, she is not sure why. Sugar around 110s-130s when waking up. States some days he doesn't take any extra clicks of his VGO. Usually he takes 0-2 extra clicks daily. He uses a freestyle vance. He has a Gvoke HypoPen. No new symptoms of low blood sugar. states his diabetic diet is \"not out of the ordinary\". Wife states sometimes too much sugar. Less than the majority of nights he will eat a dessert. Follows with Dr Dominguez with nephrology .   Lab Results   Component Value Date    HGBA1C 8.00 (H) 08/23/2024       Mechanical Aortic valve replacement 2004/Chronic atrial fibrillation/HTN : follows with [facility] and the anticoagulation clinic for warfarin management. Takes digoxin 62.5 mcg daily, metoprolol succinate 25 mg daily.     Allergies   Allergen Reactions    Penicillins Swelling    Oxycodone-Acetaminophen Nausea And Vomiting    Other Other (See Comments)     Grass, ragweed    Percocet [Oxycodone-Acetaminophen] Nausea And Vomiting        Outpatient Medications Marked as Taking for the 12/13/24 encounter (Office Visit) with Celestine English,    Medication Sig Dispense Refill    atorvastatin (LIPITOR) 40 MG tablet TAKE 1 TABLET BY MOUTH DAILY 90 tablet 1    cetirizine (zyrTEC) 10 MG tablet Take 1 tablet by mouth Daily.      Continuous Glucose Sensor (FreeStyle Vance 14 Day Sensor) misc Apply 1 each topically Every 14 (Fourteen) Days. 6 each 3    digoxin (LANOXIN) 125 MCG tablet TAKE 1/2 TABLET BY MOUTH DAILY " 45 tablet 1    empagliflozin (Jardiance) 10 MG tablet tablet TAKE 1 TABLET BY MOUTH DAILY 90 tablet 0    famotidine (PEPCID) 20 MG tablet TAKE 1 TABLET BY MOUTH DAILY 90 tablet 1    Glucagon (Gvoke HypoPen 1-Pack) 1 MG/0.2ML solution auto-injector Inject 1 mg under the skin into the appropriate area as directed 1 (One) Time As Needed (for symptoms of low blood sugar. Report to ER immediately after use.) for up to 1 dose. 0.2 mL 1    guaifenesin-dextromethorphan 600-30 mg (MUCINEX DM)  MG tablet sustained-release 12 hour Take 1 tablet by mouth 2 (Two) Times a Day As Needed (Cough). 20 tablet 0    Insulin Disposable Pump (V-Go 40) 40 UNIT/24HR kit 1 - DAILY 30 kit 5    Insulin Lispro (humaLOG) 100 UNIT/ML injection INJECT IN V-GO 40  UNITS UNDER THE SKIN OF INSULIN DAILY 30 mL 5    magnesium oxide (MAG-OX) 400 MG tablet Take 1 tablet by mouth 2 (Two) Times a Day.      metoprolol succinate XL (TOPROL-XL) 25 MG 24 hr tablet Take 1 tablet by mouth Daily. 90 tablet 1    warfarin (COUMADIN) 1 MG tablet Take 2 tablets (2 mg) by mouth on Monday Wednesday and Friday or as directed by medication management clinic. 60 tablet 1    warfarin (COUMADIN) 5 MG tablet TAKE 1/2 TABLET BY MOUTH DAILY ON MON AND FRI AND TAKE TAKE 1 TABLET BY MOUTH DAILY ON ALL OTHER DAYS 80 tablet 1        Past Medical History:   Diagnosis Date    Allergic rhinitis     Aortic valve insufficiency     Ascending aortic aneurysm     Aspiration pneumonia     Atrial fibrillation     Bacteremia     Calcific aortic stenosis of bicuspid valve     Cardiac arrest     Cataracts, bilateral     CKD (chronic kidney disease) stage 3, GFR 30-59 ml/min     Contact dermatitis due to poison ivy     Elbow fracture     GERD (gastroesophageal reflux disease)     GI bleed     2021; s/p Colonoscopy and EGD    H/O mechanical aortic valve replacement     Head injury     History of transfusion     Hyperlipidemia     Hypertension     Kidney carcinoma     Lumbosacral  "plexopathy     secondary to left iliopsoas hematoma: follows with [facility] for management    Nonischemic cardiomyopathy     Prostate cancer     Renal oncocytoma     Stroke (cerebrum)     Type 2 diabetes mellitus     Visual field defect        OBJECTIVE    Vital Signs:   /70   Pulse 63   Temp 97.6 °F (36.4 °C) (Infrared)   Ht 182.9 cm (72\")   Wt 64.9 kg (143 lb)   SpO2 97%   BMI 19.39 kg/m²        Physical Exam  Vitals reviewed.   Constitutional:       General: He is not in acute distress.     Appearance: Normal appearance. He is normal weight.   Eyes:      General: No scleral icterus.  Cardiovascular:      Rate and Rhythm: Normal rate. Rhythm irregular.      Comments: Mechanical valve click  Pulmonary:      Effort: Pulmonary effort is normal. No respiratory distress.   Musculoskeletal:      Right lower leg: No edema.      Left lower leg: No edema.      Comments: Ambulating with walker   Skin:     Coloration: Skin is not jaundiced.   Neurological:      Mental Status: He is alert.   Psychiatric:         Mood and Affect: Mood normal.         Behavior: Behavior normal.         Thought Content: Thought content normal.                             ASSESSMENT & PLAN     Diagnoses and all orders for this visit:    1. Type 2 diabetes mellitus with hyperglycemia, with long-term current use of insulin (Primary)  2. Type 2 diabetes mellitus with stage 3b chronic kidney disease, with long-term current use of insulin  - still using VGO 40. Insurance did not cover Farxiga so now on Jardiance (empagliflozin) 10 mg daily. Higher dosages of Jardiance are being avoided due to dehydration. After last visit sitagliptin 25 mg daily was recommended but wife states they never started it, she is not sure why. Sugar around 110s-130s when waking up. States some days he doesn't take any extra clicks of his VGO. Usually he takes 0-2 extra clicks daily. He uses a freestyle lisa. He has a Gvoke HypoPen. No new " "symptoms of low blood sugar. states his diabetic diet is \"not out of the ordinary\". Wife states sometimes too much sugar. Less than the majority of nights he will eat a dessert. Follows with Dr Dominguez with nephrology .   Lab Results   Component Value Date    HGBA1C 8.00 (H) 08/23/2024     -recheck A1c today. He believes his sugar is a little better than last visit as he is using less clicks.         -will check A1c today to see if he is getting near his goal of 7.5 or less (given age and comorbidities) . If not at goal, will again recommend the start of sitagliptin.         -check BMP today to continue monitoring renal function    3. Essential hypertension  4. Chronic atrial fibrillation  - follows with [facility] and the anticoagulation clinic for warfarin management. Takes digoxin 62.5 mcg daily, metoprolol succinate 25 mg daily.   -/70 in office today, HR 63  -euvolemic on exam  -I reviewed most recent cardiology progress note  -continue current regimen           Follow Up  Return in about 3 months (around 3/13/2025) for Medicare Wellness.    Patient/family had no further questions at this time and verbalized understanding of the plan discussed today.   "

## 2024-12-13 NOTE — PROGRESS NOTES
Anticoagulation Clinic Progress Note    Anticoagulation Summary  As of 12/13/2024      INR goal:  3.0-3.5   TTR:  62.9% (5.9 y)   INR used for dosing:  3.50 (12/13/2024)   Warfarin maintenance plan:  2.5 mg every Mon, Wed, Fri; 5 mg all other days   Weekly warfarin total:  27.5 mg   No change documented:  Vasquez Niño, PharmD   Plan last modified:  Carmen El PharmD (11/8/2024)   Next INR check:  12/20/2024   Priority:  High   Target end date:  Indefinite    Indications    History of aortic valve replacement with metallic valve [Z95.4]  Atrial fibrillation [I48.91]  Cerebrovascular accident (CVA) due to 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]                 Anticoagulation Episode Summary       INR check location:  --    Preferred lab:  --    Send INR reminders to:  [facility]    Comments:  **Home testing training 3/3/23** *CALL EVERY TIME* New INR goal 3 - 3.5 (see 1/2020 hospitalization for CVA)          Anticoagulation Care Providers       Provider Role Specialty Phone number    Griffin Fried MD Referring Cardiology [phone]            Clinic Interview:  Patient Findings     Negatives:  Signs/symptoms of thrombosis, Signs/symptoms of bleeding,   Laboratory test error suspected, Change in health, Change in alcohol use,   Change in activity, Upcoming invasive procedure, Emergency department   visit, Upcoming dental procedure, Missed doses, Extra doses, Change in   medications, Change in diet/appetite, Hospital admission, Bruising, Other   complaints      Clinical Outcomes     Negatives:  Major bleeding event, Thromboembolic event,   Anticoagulation-related hospital admission, Anticoagulation-related ED   visit, Anticoagulation-related fatality        INR History:      11/27/2024     8:56 AM 12/2/2024    12:00 AM 12/2/2024     9:22 AM 12/6/2024    12:00 AM 12/6/2024     8:31 AM 12/13/2024    12:00 AM 12/13/2024     8:00 AM   Anticoagulation Monitoring   INR 3.90  3.40   3.80  3.50   INR Date 11/27/2024 12/2/2024 12/6/2024 12/13/2024   INR Goal 3.0-3.5  3.0-3.5  3.0-3.5  3.0-3.5   Trend Same  Same  Same  Same   Last Week Total 27.5 mg  25 mg  27.5 mg  27.5 mg   Next Week Total 25 mg  27.5 mg  27.5 mg  27.5 mg   Sun 5 mg  -  5 mg  5 mg   Mon 2.5 mg  2.5 mg  2.5 mg  2.5 mg   Tue -  5 mg  5 mg  5 mg   Wed 2.5 mg  2.5 mg  2.5 mg  2.5 mg   Thu 2.5 mg (11/28)  5 mg  5 mg  5 mg   Fri 2.5 mg  -  2.5 mg  2.5 mg   Sat 5 mg  -  5 mg  5 mg   Historical INR  3.40      3.80      3.50        Visit Report      Report Report       This result is from an external source.       Plan:  1. INR is Therapeutic today- see above in Anticoagulation Summary.   Will instruct LAURA Sequeira to Continue their warfarin regimen- see above in Anticoagulation Summary.  2. Follow up in 1 week.  3. They have been instructed to call if any changes in medications, doses, concerns, etc. Patient expresses understanding and has no further questions at this time.    Vasquez Niño, PharmD

## 2024-12-15 RX ORDER — DIGOXIN 125 MCG
62.5 TABLET ORAL DAILY
Qty: 45 TABLET | Refills: 1 | Status: SHIPPED | OUTPATIENT
Start: 2024-12-15

## 2024-12-16 DIAGNOSIS — E11.65 TYPE 2 DIABETES MELLITUS WITH HYPERGLYCEMIA, WITH LONG-TERM CURRENT USE OF INSULIN: ICD-10-CM

## 2024-12-16 DIAGNOSIS — Z79.4 TYPE 2 DIABETES MELLITUS WITH HYPERGLYCEMIA, WITH LONG-TERM CURRENT USE OF INSULIN: ICD-10-CM

## 2024-12-20 ENCOUNTER — ANTICOAGULATION VISIT (OUTPATIENT)
Dept: PHARMACY | Facility: HOSPITAL | Age: 89
End: 2024-12-20
Payer: COMMERCIAL

## 2024-12-20 DIAGNOSIS — I63.411 CEREBROVASCULAR ACCIDENT (CVA) DUE TO EMBOLISM OF RIGHT MIDDLE CEREBRAL ARTERY: ICD-10-CM

## 2024-12-20 DIAGNOSIS — Z95.4 HISTORY OF AORTIC VALVE REPLACEMENT WITH METALLIC VALVE: Primary | ICD-10-CM

## 2024-12-20 LAB — INR PPP: 3.8

## 2024-12-20 NOTE — PROGRESS NOTES
Anticoagulation Clinic Progress Note    Anticoagulation Summary  As of 12/20/2024      INR goal:  3.0-3.5   TTR:  62.7% (5.9 y)   INR used for dosing:  3.80 (12/20/2024)   Warfarin maintenance plan:  2.5 mg every Mon, Wed, Fri; 5 mg all other days   Weekly warfarin total:  27.5 mg   No change documented:  Vasquez Niño, PharmD   Plan last modified:  Carmen El PharmD (11/8/2024)   Next INR check:  12/27/2024   Priority:  High   Target end date:  Indefinite    Indications    History of aortic valve replacement with metallic valve [Z95.4]  Atrial fibrillation [I48.91]  Cerebrovascular accident (CVA) due to 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]                 Anticoagulation Episode Summary       INR check location:  --    Preferred lab:  --    Send INR reminders to:  [facility]    Comments:  **Home testing training 3/3/23** *CALL EVERY TIME* New INR goal 3 - 3.5 (see 1/2020 hospitalization for CVA)          Anticoagulation Care Providers       Provider Role Specialty Phone number    Griffin Fried MD Referring Cardiology [phone]            Clinic Interview:  Patient Findings     Negatives:  Signs/symptoms of thrombosis, Signs/symptoms of bleeding,   Laboratory test error suspected, Change in health, Change in alcohol use,   Change in activity, Upcoming invasive procedure, Emergency department   visit, Upcoming dental procedure, Missed doses, Extra doses, Change in   medications, Change in diet/appetite, Hospital admission, Bruising, Other   complaints      Clinical Outcomes     Negatives:  Major bleeding event, Thromboembolic event,   Anticoagulation-related hospital admission, Anticoagulation-related ED   visit, Anticoagulation-related fatality        INR History:      12/2/2024     9:22 AM 12/6/2024    12:00 AM 12/6/2024     8:31 AM 12/13/2024    12:00 AM 12/13/2024     8:00 AM 12/20/2024    12:00 AM 12/20/2024     8:22 AM   Anticoagulation Monitoring   INR 3.40  3.80   3.50  3.80   INR Date 12/2/2024 12/6/2024 12/13/2024 12/20/2024   INR Goal 3.0-3.5  3.0-3.5  3.0-3.5  3.0-3.5   Trend Same  Same  Same  Same   Last Week Total 25 mg  27.5 mg  27.5 mg  27.5 mg   Next Week Total 27.5 mg  27.5 mg  27.5 mg  27.5 mg   Sun -  5 mg  5 mg  5 mg   Mon 2.5 mg  2.5 mg  2.5 mg  2.5 mg   Tue 5 mg  5 mg  5 mg  5 mg   Wed 2.5 mg  2.5 mg  2.5 mg  2.5 mg   Thu 5 mg  5 mg  5 mg  5 mg   Fri -  2.5 mg  2.5 mg  2.5 mg   Sat -  5 mg  5 mg  5 mg   Historical INR  3.80      3.50      3.80        Visit Report    Report Report         This result is from an external source.       Plan:  1. INR is Supratherapeutic today- see above in Anticoagulation Summary. Stroke risk is high; hesitant to reduce total weekly dose of warfarin further yet due to risk of causing subtherapeutic INR.   Will instruct LAURA Sequeira to Continue their warfarin regimen- see above in Anticoagulation Summary.  2. Follow up in 1 week.  3. They have been instructed to call if any changes in medications, doses, concerns, etc. Patient expresses understanding and has no further questions at this time.    Vasquez Niño, PharmD

## 2024-12-27 ENCOUNTER — ANTICOAGULATION VISIT (OUTPATIENT)
Dept: PHARMACY | Facility: HOSPITAL | Age: 89
End: 2024-12-27
Payer: COMMERCIAL

## 2024-12-27 DIAGNOSIS — Z95.4 HISTORY OF AORTIC VALVE REPLACEMENT WITH METALLIC VALVE: Primary | ICD-10-CM

## 2024-12-27 DIAGNOSIS — I63.411 CEREBROVASCULAR ACCIDENT (CVA) DUE TO EMBOLISM OF RIGHT MIDDLE CEREBRAL ARTERY: ICD-10-CM

## 2024-12-27 LAB — INR PPP: 2.7

## 2024-12-27 NOTE — PROGRESS NOTES
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]                 Anticoagulation Episode Summary       INR check location:  --    Preferred lab:  --    Send INR reminders to:  [facility]    Comments:  **Home testing training 3/3/23** *CALL EVERY TIME* New INR goal 3 - 3.5 (see 2020 hospitalization for CVA)          Anticoagulation Care Providers       Provider Role Specialty Phone number    Griffin Fried MD Referring Cardiology [phone]            Clinic Interview:  Patient Findings     Positives:  Change in diet/appetite    Negatives:  Signs/symptoms of thrombosis, Signs/symptoms of bleeding,   Laboratory test error suspected, Change in health, Change in alcohol use,   Change in activity, Upcoming invasive procedure, Emergency department   visit, Upcoming dental procedure, Missed doses, Extra doses, Change in   medications, Hospital admission, Bruising, Other complaints    Comments:  Spoke with spouseGladys. He normally eats pineapple with   cottage cheese at least 3-4 days each week; however, he has not eaten any   this week. He plans to reincorporate this into his diet next week.       Clinical Outcomes     Negatives:  Major bleeding event, Thromboembolic event,   Anticoagulation-related hospital admission, Anticoagulation-related ED   visit, Anticoagulation-related fatality    Comments:  Spoke with spouseGladys. He  normally eats pineapple with   cottage cheese at least 3-4 days each week; however, he has not eaten any   this week. He plans to reincorporate this into his diet next week.         INR History:      12/6/2024     8:31 AM 12/13/2024    12:00 AM 12/13/2024     8:00 AM 12/20/2024    12:00 AM 12/20/2024     8:22 AM 12/27/2024    12:00 AM 12/27/2024     8:53 AM   Anticoagulation Monitoring   INR 3.80  3.50  3.80  2.70   INR Date 12/6/2024 12/13/2024 12/20/2024 12/27/2024   INR Goal 3.0-3.5  3.0-3.5  3.0-3.5  3.0-3.5   Trend Same  Same  Same  Same   Last Week Total 27.5 mg  27.5 mg  27.5 mg  27.5 mg   Next Week Total 27.5 mg  27.5 mg  27.5 mg  30 mg   Sun 5 mg  5 mg  5 mg  5 mg   Mon 2.5 mg  2.5 mg  2.5 mg  2.5 mg   Tue 5 mg  5 mg  5 mg  5 mg   Wed 2.5 mg  2.5 mg  2.5 mg  2.5 mg   Thu 5 mg  5 mg  5 mg  5 mg   Fri 2.5 mg  2.5 mg  2.5 mg  5 mg (12/27)   Sat 5 mg  5 mg  5 mg  5 mg   Historical INR  3.50      3.80      2.70        Visit Report  Report Report           This result is from an external source.       Plan:  1. INR is Supratherapeutic today- see above in Anticoagulation Summary.   Will instruct LAURA Sequeira to Change their warfarin regimen (5 mg today, then resume 2.5 mg MWF, 5 mg all other days) - see above in Anticoagulation Summary.  2. Follow up in 1 week.  3. They have been instructed to call if any changes in medications, doses, concerns, etc. Patient expresses understanding and has no further questions at this time.    Vasquez Niño, PharmD

## 2024-12-30 DIAGNOSIS — I10 ESSENTIAL HYPERTENSION: ICD-10-CM

## 2024-12-30 RX ORDER — METOPROLOL SUCCINATE 25 MG/1
25 TABLET, EXTENDED RELEASE ORAL DAILY
Qty: 90 TABLET | Refills: 1 | Status: SHIPPED | OUTPATIENT
Start: 2024-12-30

## 2025-01-02 ENCOUNTER — ANTICOAGULATION VISIT (OUTPATIENT)
Dept: PHARMACY | Facility: HOSPITAL | Age: 89
End: 2025-01-02
Payer: COMMERCIAL

## 2025-01-02 DIAGNOSIS — I63.411 CEREBROVASCULAR ACCIDENT (CVA) DUE TO EMBOLISM OF RIGHT MIDDLE CEREBRAL ARTERY: ICD-10-CM

## 2025-01-02 DIAGNOSIS — Z95.4 HISTORY OF AORTIC VALVE REPLACEMENT WITH METALLIC VALVE: Primary | ICD-10-CM

## 2025-01-02 RX ORDER — WARFARIN SODIUM 5 MG/1
TABLET ORAL
Qty: 75 TABLET | Refills: 1 | Status: SHIPPED | OUTPATIENT
Start: 2025-01-02

## 2025-01-03 ENCOUNTER — ANTICOAGULATION VISIT (OUTPATIENT)
Dept: PHARMACY | Facility: HOSPITAL | Age: 89
End: 2025-01-03
Payer: COMMERCIAL

## 2025-01-03 DIAGNOSIS — Z95.4 HISTORY OF AORTIC VALVE REPLACEMENT WITH METALLIC VALVE: Primary | ICD-10-CM

## 2025-01-03 DIAGNOSIS — I63.411 CEREBROVASCULAR ACCIDENT (CVA) DUE TO EMBOLISM OF RIGHT MIDDLE CEREBRAL ARTERY: ICD-10-CM

## 2025-01-03 LAB — INR PPP: 1.9

## 2025-01-03 NOTE — PROGRESS NOTES
Anticoagulation Clinic Progress Note    Anticoagulation Summary  As of 1/3/2025      INR goal:  3.0-3.5   TTR:  62.4% (6 y)   INR used for dosin.90 (1/3/2025)   Warfarin maintenance plan:  2.5 mg every Mon, Wed, Fri; 5 mg all other days   Weekly warfarin total:  27.5 mg   Plan last modified:  Carmen El, PharmD (2024)   Next INR check:  2025   Priority:  High   Target end date:  Indefinite    Indications    History of aortic valve replacement with metallic valve [Z95.4]  Atrial fibrillation [I48.91]  Cerebrovascular accident (CVA) due to 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]                 Anticoagulation Episode Summary       INR check location:  --    Preferred lab:  --    Send INR reminders to:  [facility]    Comments:  **Home testing training 3/3/23** *CALL EVERY TIME* New INR goal 3 - 3.5 (see 2020 hospitalization for CVA)          Anticoagulation Care Providers       Provider Role Specialty Phone number    Griffin Fried MD Referring Cardiology [phone]            Clinic Interview:  Patient Findings     Positives:  Change in diet/appetite    Negatives:  Signs/symptoms of thrombosis, Signs/symptoms of bleeding,   Laboratory test error suspected, Change in health, Change in alcohol use,   Change in activity, Upcoming invasive procedure, Emergency department   visit, Upcoming dental procedure, Missed doses, Extra doses, Change in   medications, Hospital admission, Bruising, Other complaints    Comments:  Reports he ate some broccoli, cauliflower, and celery   throughout the week.       Clinical Outcomes     Negatives:  Major bleeding event, Thromboembolic event,   Anticoagulation-related hospital admission, Anticoagulation-related ED   visit, Anticoagulation-related fatality    Comments:  Reports he ate some broccoli, cauliflower, and celery   throughout the week.         INR History:      2024    12:00 AM 2024     8:22 AM 2024     12:00 AM 12/27/2024     8:53 AM 1/2/2025    11:57 AM 1/3/2025    12:00 AM 1/3/2025     9:34 AM   Anticoagulation Monitoring   INR  3.80  2.70 --  1.90   INR Date  12/20/2024  12/27/2024   1/3/2025   INR Goal  3.0-3.5  3.0-3.5 3.0-3.5  3.0-3.5   Trend  Same  Same   Same   Last Week Total  27.5 mg  27.5 mg   30 mg   Next Week Total  27.5 mg  30 mg   32.5 mg   Sun  5 mg  5 mg   5 mg   Mon  2.5 mg  2.5 mg   -   Tue  5 mg  5 mg   -   Wed  2.5 mg  2.5 mg   -   Thu  5 mg  5 mg   -   Fri  2.5 mg  5 mg (12/27)   7.5 mg (1/3)   Sat  5 mg  5 mg   5 mg   Historical INR 3.80      2.70       1.90            This result is from an external source.       Plan:  1. INR is Subtherapeutic today- see above in Anticoagulation Summary.   Will instruct LAURA Sequeira to Change their warfarin regimen (7.5 mg today, 5 mg Sat/Sun) - see above in Anticoagulation Summary.  2. Follow up in 3 days.   3. They have been instructed to call if any changes in medications, doses, concerns, etc. Patient expresses understanding and has no further questions at this time.    Vasquez Niño, PharmD

## 2025-01-06 ENCOUNTER — ANTICOAGULATION VISIT (OUTPATIENT)
Dept: PHARMACY | Facility: HOSPITAL | Age: 89
End: 2025-01-06
Payer: COMMERCIAL

## 2025-01-06 DIAGNOSIS — Z95.4 HISTORY OF AORTIC VALVE REPLACEMENT WITH METALLIC VALVE: Primary | ICD-10-CM

## 2025-01-06 DIAGNOSIS — I63.411 CEREBROVASCULAR ACCIDENT (CVA) DUE TO EMBOLISM OF RIGHT MIDDLE CEREBRAL ARTERY: ICD-10-CM

## 2025-01-06 LAB — INR PPP: 3.4

## 2025-01-06 NOTE — PROGRESS NOTES
Anticoagulation Clinic Progress Note    Anticoagulation Summary  As of 1/6/2025      INR goal:  3.0-3.5   TTR:  62.4% (6 y)   INR used for dosing:  3.40 (1/6/2025)   Warfarin maintenance plan:  2.5 mg every Mon, Wed, Fri; 5 mg all other days   Weekly warfarin total:  27.5 mg   No change documented:  Vasquez Niño, PharmD   Plan last modified:  Carmen El PharmD (11/8/2024)   Next INR check:  1/10/2025   Priority:  High   Target end date:  Indefinite    Indications    History of aortic valve replacement with metallic valve [Z95.4]  Atrial fibrillation [I48.91]  Cerebrovascular accident (CVA) due to 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]                 Anticoagulation Episode Summary       INR check location:  --    Preferred lab:  --    Send INR reminders to:  [facility]    Comments:  **Home testing training 3/3/23** *CALL EVERY TIME* New INR goal 3 - 3.5 (see 1/2020 hospitalization for CVA)          Anticoagulation Care Providers       Provider Role Specialty Phone number    Griffin Fried MD Referring Cardiology [phone]            Clinic Interview:  Patient Findings     Negatives:  Signs/symptoms of thrombosis, Signs/symptoms of bleeding,   Laboratory test error suspected, Change in health, Change in alcohol use,   Change in activity, Upcoming invasive procedure, Emergency department   visit, Upcoming dental procedure, Missed doses, Extra doses, Change in   medications, Change in diet/appetite, Hospital admission, Bruising, Other   complaints      Clinical Outcomes     Negatives:  Major bleeding event, Thromboembolic event,   Anticoagulation-related hospital admission, Anticoagulation-related ED   visit, Anticoagulation-related fatality        INR History:      12/27/2024    12:00 AM 12/27/2024     8:53 AM 1/2/2025    11:57 AM 1/3/2025    12:00 AM 1/3/2025     9:34 AM 1/6/2025    12:00 AM 1/6/2025    12:38 PM   Anticoagulation Monitoring   INR  2.70 --  1.90  3.40    INR Date  12/27/2024   1/3/2025  1/6/2025   INR Goal  3.0-3.5 3.0-3.5  3.0-3.5  3.0-3.5   Trend  Same   Same  Same   Last Week Total  27.5 mg   30 mg  32.5 mg   Next Week Total  30 mg   32.5 mg  27.5 mg   Sun  5 mg   5 mg  -   Mon  2.5 mg   -  2.5 mg   Tue  5 mg   -  5 mg   Wed  2.5 mg   -  2.5 mg   Thu  5 mg   -  5 mg   Fri  5 mg (12/27)   7.5 mg (1/3)  -   Sat  5 mg   5 mg  -   Historical INR 2.70       1.90      3.40            This result is from an external source.       Plan:  1. INR is Therapeutic today- see above in Anticoagulation Summary.   Will instruct LAURA Sequeira to Continue their warfarin regimen at dose of 2.5 mg MWF, 5 mg all other days per patient/spouse request - see above in Anticoagulation Summary.   2. Follow up in 4 days. Pending INR, may increase to 2.5 mg 1-2 days/wk.   3. They have been instructed to call if any changes in medications, doses, concerns, etc. Patient expresses understanding and has no further questions at this time.     Vasquez Niño, PharmD

## 2025-01-10 ENCOUNTER — ANTICOAGULATION VISIT (OUTPATIENT)
Dept: PHARMACY | Facility: HOSPITAL | Age: 89
End: 2025-01-10
Payer: COMMERCIAL

## 2025-01-10 DIAGNOSIS — Z95.4 HISTORY OF AORTIC VALVE REPLACEMENT WITH METALLIC VALVE: Primary | ICD-10-CM

## 2025-01-10 DIAGNOSIS — I63.411 CEREBROVASCULAR ACCIDENT (CVA) DUE TO EMBOLISM OF RIGHT MIDDLE CEREBRAL ARTERY: ICD-10-CM

## 2025-01-10 LAB — INR PPP: 3.5

## 2025-01-10 PROCEDURE — G0249 PROVIDE INR TEST MATER/EQUIP: HCPCS

## 2025-01-10 NOTE — PROGRESS NOTES
Anticoagulation Clinic Progress Note    Anticoagulation Summary  As of 1/10/2025      INR goal:  3.0-3.5   TTR:  62.4% (6 y)   INR used for dosing:  3.50 (1/10/2025)   Warfarin maintenance plan:  2.5 mg every Mon, Wed, Fri; 5 mg all other days   Weekly warfarin total:  27.5 mg   No change documented:  Vasquez Niño, PharmD   Plan last modified:  Carmen El PharmD (11/8/2024)   Next INR check:  1/17/2025   Priority:  High   Target end date:  Indefinite    Indications    History of aortic valve replacement with metallic valve [Z95.4]  Atrial fibrillation [I48.91]  Cerebrovascular accident (CVA) due to 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]                 Anticoagulation Episode Summary       INR check location:  --    Preferred lab:  --    Send INR reminders to:  [facility]    Comments:  **Home testing training 3/3/23** *CALL EVERY TIME* New INR goal 3 - 3.5 (see 1/2020 hospitalization for CVA)          Anticoagulation Care Providers       Provider Role Specialty Phone number    Griffin Fried MD Referring Cardiology [phone]            Clinic Interview:  Patient Findings     Negatives:  Signs/symptoms of thrombosis, Signs/symptoms of bleeding,   Laboratory test error suspected, Change in health, Change in alcohol use,   Change in activity, Upcoming invasive procedure, Emergency department   visit, Upcoming dental procedure, Missed doses, Extra doses, Change in   medications, Change in diet/appetite, Hospital admission, Bruising, Other   complaints      Clinical Outcomes     Negatives:  Major bleeding event, Thromboembolic event,   Anticoagulation-related hospital admission, Anticoagulation-related ED   visit, Anticoagulation-related fatality        INR History:      1/2/2025    11:57 AM 1/3/2025    12:00 AM 1/3/2025     9:34 AM 1/6/2025    12:00 AM 1/6/2025    12:38 PM 1/10/2025    12:00 AM 1/10/2025     2:15 PM   Anticoagulation Monitoring   INR --  1.90  3.40  3.50    INR Date   1/3/2025  1/6/2025  1/10/2025   INR Goal 3.0-3.5  3.0-3.5  3.0-3.5  3.0-3.5   Trend   Same  Same  Same   Last Week Total   30 mg  32.5 mg  32.5 mg   Next Week Total   32.5 mg  27.5 mg  27.5 mg   Sun   5 mg  -  5 mg   Mon   -  2.5 mg  2.5 mg   Tue   -  5 mg  5 mg   Wed   -  2.5 mg  2.5 mg   Thu   -  5 mg  5 mg   Fri   7.5 mg (1/3)  -  2.5 mg   Sat   5 mg  -  5 mg   Historical INR  1.90      3.40      3.50            This result is from an external source.       Plan:  1. INR is Therapeutic today- see above in Anticoagulation Summary.   Will instruct LAURA Sequeira to Continue their warfarin regimen- see above in Anticoagulation Summary.  2. Follow up in 1 week.  3. They have been instructed to call if any changes in medications, doses, concerns, etc. Patient expresses understanding and has no further questions at this time.    Vasquez Niño, PharmD

## 2025-01-17 ENCOUNTER — ANTICOAGULATION VISIT (OUTPATIENT)
Dept: PHARMACY | Facility: HOSPITAL | Age: 89
End: 2025-01-17
Payer: COMMERCIAL

## 2025-01-17 DIAGNOSIS — Z95.4 HISTORY OF AORTIC VALVE REPLACEMENT WITH METALLIC VALVE: Primary | ICD-10-CM

## 2025-01-17 DIAGNOSIS — I63.411 CEREBROVASCULAR ACCIDENT (CVA) DUE TO EMBOLISM OF RIGHT MIDDLE CEREBRAL ARTERY: ICD-10-CM

## 2025-01-17 LAB — INR PPP: 3

## 2025-01-17 NOTE — PROGRESS NOTES
Anticoagulation Clinic Progress Note    Anticoagulation Summary  As of 1/17/2025      INR goal:  3.0-3.5   TTR:  62.6% (6 y)   INR used for dosing:  3.00 (1/17/2025)   Warfarin maintenance plan:  2.5 mg every Mon, Wed, Fri; 5 mg all other days   Weekly warfarin total:  27.5 mg   No change documented:  Michelle Piña, RODDY   Plan last modified:  Carmen El, PharmD (11/8/2024)   Next INR check:  1/24/2025   Priority:  High   Target end date:  Indefinite    Indications    History of aortic valve replacement with metallic valve [Z95.4]  Atrial fibrillation [I48.91]  Cerebrovascular accident (CVA) due to 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]                 Anticoagulation Episode Summary       INR check location:  --    Preferred lab:  --    Send INR reminders to:   [facility]    Comments:  **Home testing training 3/3/23** *CALL EVERY TIME* New INR goal 3 - 3.5 (see 1/2020 hospitalization for CVA)          Anticoagulation Care Providers       Provider Role Specialty Phone number    Griffin Fried MD Referring Cardiology [phone]            Clinic Interview:  No pertinent clinical findings have been reported.    INR History:      1/3/2025     9:34 AM 1/6/2025    12:00 AM 1/6/2025    12:38 PM 1/10/2025    12:00 AM 1/10/2025     2:15 PM 1/17/2025    12:00 AM 1/17/2025     9:03 AM   Anticoagulation Monitoring   INR 1.90  3.40  3.50  3.00   INR Date 1/3/2025  1/6/2025  1/10/2025  1/17/2025   INR Goal 3.0-3.5  3.0-3.5  3.0-3.5  3.0-3.5   Trend Same  Same  Same  Same   Last Week Total 30 mg  32.5 mg  32.5 mg  27.5 mg   Next Week Total 32.5 mg  27.5 mg  27.5 mg  27.5 mg   Sun 5 mg  -  5 mg  5 mg   Mon -  2.5 mg  2.5 mg  2.5 mg   Tue -  5 mg  5 mg  5 mg   Wed -  2.5 mg  2.5 mg  2.5 mg   Thu -  5 mg  5 mg  5 mg   Fri 7.5 mg (1/3)  -  2.5 mg  2.5 mg   Sat 5 mg  -  5 mg  5 mg   Historical INR  3.40      3.50      3.00            This result is from an external source.       Plan:  1.  INR is therapeutic today- see above in Anticoagulation Summary.    LAURA Sequeira to continue their warfarin regimen- see above in Anticoagulation Summary.  2. Follow up in 1 week  3. Pt has agreed to only be called if INR out of range. They have been instructed to call if any changes in medications, doses, concerns, etc. Patient expresses understanding and has no further questions at this time.    Michelle Piña, [facility]

## 2025-01-24 ENCOUNTER — ANTICOAGULATION VISIT (OUTPATIENT)
Dept: PHARMACY | Facility: HOSPITAL | Age: 89
End: 2025-01-24
Payer: COMMERCIAL

## 2025-01-24 DIAGNOSIS — Z95.4 HISTORY OF AORTIC VALVE REPLACEMENT WITH METALLIC VALVE: Primary | ICD-10-CM

## 2025-01-24 DIAGNOSIS — I63.411 CEREBROVASCULAR ACCIDENT (CVA) DUE TO EMBOLISM OF RIGHT MIDDLE CEREBRAL ARTERY: ICD-10-CM

## 2025-01-24 LAB — INR PPP: 3.3

## 2025-01-24 NOTE — PROGRESS NOTES
Anticoagulation Clinic Progress Note    Anticoagulation Summary  As of 1/24/2025      INR goal:  3.0-3.5   TTR:  62.7% (6 y)   INR used for dosing:  3.30 (1/24/2025)   Warfarin maintenance plan:  2.5 mg every Mon, Wed, Fri; 5 mg all other days   Weekly warfarin total:  27.5 mg   No change documented:  Michelle Piña, RODDY   Plan last modified:  Carmen El, PharmD (11/8/2024)   Next INR check:  1/31/2025   Priority:  High   Target end date:  Indefinite    Indications    History of aortic valve replacement with metallic valve [Z95.4]  Atrial fibrillation [I48.91]  Cerebrovascular accident (CVA) due to 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]                 Anticoagulation Episode Summary       INR check location:  --    Preferred lab:  --    Send INR reminders to:   [facility]    Comments:  **Home testing training 3/3/23** *CALL EVERY TIME* New INR goal 3 - 3.5 (see 1/2020 hospitalization for CVA)          Anticoagulation Care Providers       Provider Role Specialty Phone number    Griffin Fried MD Referring Cardiology [phone]            Clinic Interview:  No pertinent clinical findings have been reported.    INR History:      1/6/2025    12:38 PM 1/10/2025    12:00 AM 1/10/2025     2:15 PM 1/17/2025    12:00 AM 1/17/2025     9:03 AM 1/24/2025    12:00 AM 1/24/2025     8:23 AM   Anticoagulation Monitoring   INR 3.40  3.50  3.00  3.30   INR Date 1/6/2025  1/10/2025  1/17/2025  1/24/2025   INR Goal 3.0-3.5  3.0-3.5  3.0-3.5  3.0-3.5   Trend Same  Same  Same  Same   Last Week Total 32.5 mg  32.5 mg  27.5 mg  27.5 mg   Next Week Total 27.5 mg  27.5 mg  27.5 mg  27.5 mg   Sun -  5 mg  5 mg  5 mg   Mon 2.5 mg  2.5 mg  2.5 mg  2.5 mg   Tue 5 mg  5 mg  5 mg  5 mg   Wed 2.5 mg  2.5 mg  2.5 mg  2.5 mg   Thu 5 mg  5 mg  5 mg  5 mg   Fri -  2.5 mg  2.5 mg  2.5 mg   Sat -  5 mg  5 mg  5 mg   Historical INR  3.50      3.00      3.30            This result is from an external source.        Plan:  1. INR is therapeutic today- see above in Anticoagulation Summary.    LAURA Sequeira to continue their warfarin regimen- see above in Anticoagulation Summary.  2. Follow up in 1 week  3. They have been instructed to call if any changes in medications, doses, concerns, etc. Patient expresses understanding and has no further questions at this time.    Michelle Piña, [facility]

## 2025-01-31 ENCOUNTER — ANTICOAGULATION VISIT (OUTPATIENT)
Dept: PHARMACY | Facility: HOSPITAL | Age: 89
End: 2025-01-31
Payer: COMMERCIAL

## 2025-01-31 DIAGNOSIS — I63.411 CEREBROVASCULAR ACCIDENT (CVA) DUE TO EMBOLISM OF RIGHT MIDDLE CEREBRAL ARTERY: ICD-10-CM

## 2025-01-31 DIAGNOSIS — Z95.4 HISTORY OF AORTIC VALVE REPLACEMENT WITH METALLIC VALVE: Primary | ICD-10-CM

## 2025-01-31 LAB — INR PPP: 3

## 2025-01-31 NOTE — PROGRESS NOTES
Anticoagulation Clinic Progress Note    Anticoagulation Summary  As of 1/31/2025      INR goal:  3.0-3.5   TTR:  62.8% (6 y)   INR used for dosing:  3.00 (1/31/2025)   Warfarin maintenance plan:  2.5 mg every Mon, Wed, Fri; 5 mg all other days   Weekly warfarin total:  27.5 mg   No change documented:  Vasquez Niño, PharmD   Plan last modified:  Carmen El PharmD (11/8/2024)   Next INR check:  2/7/2025   Priority:  High   Target end date:  Indefinite    Indications    History of aortic valve replacement with metallic valve [Z95.4]  Atrial fibrillation [I48.91]  Cerebrovascular accident (CVA) due to 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]                 Anticoagulation Episode Summary       INR check location:  --    Preferred lab:  --    Send INR reminders to:  [facility]    Comments:  **Home testing training 3/3/23** *CALL EVERY TIME* New INR goal 3 - 3.5 (see 1/2020 hospitalization for CVA)          Anticoagulation Care Providers       Provider Role Specialty Phone number    Griffin Fried MD Referring Cardiology [phone]            Clinic Interview:  Patient Findings     Negatives:  Signs/symptoms of thrombosis, Signs/symptoms of bleeding,   Laboratory test error suspected, Change in health, Change in alcohol use,   Change in activity, Upcoming invasive procedure, Emergency department   visit, Upcoming dental procedure, Missed doses, Extra doses, Change in   medications, Change in diet/appetite, Hospital admission, Bruising, Other   complaints      Clinical Outcomes     Negatives:  Major bleeding event, Thromboembolic event,   Anticoagulation-related hospital admission, Anticoagulation-related ED   visit, Anticoagulation-related fatality        INR History:      1/10/2025     2:15 PM 1/17/2025    12:00 AM 1/17/2025     9:03 AM 1/24/2025    12:00 AM 1/24/2025     8:23 AM 1/31/2025    12:00 AM 1/31/2025     8:47 AM   Anticoagulation Monitoring   INR 3.50  3.00  3.30   3.00   INR Date 1/10/2025  1/17/2025  1/24/2025  1/31/2025   INR Goal 3.0-3.5  3.0-3.5  3.0-3.5  3.0-3.5   Trend Same  Same  Same  Same   Last Week Total 32.5 mg  27.5 mg  27.5 mg  27.5 mg   Next Week Total 27.5 mg  27.5 mg  27.5 mg  27.5 mg   Sun 5 mg  5 mg  5 mg  5 mg   Mon 2.5 mg  2.5 mg  2.5 mg  2.5 mg   Tue 5 mg  5 mg  5 mg  5 mg   Wed 2.5 mg  2.5 mg  2.5 mg  2.5 mg   Thu 5 mg  5 mg  5 mg  5 mg   Fri 2.5 mg  2.5 mg  2.5 mg  2.5 mg   Sat 5 mg  5 mg  5 mg  5 mg   Historical INR  3.00      3.30      3.00            This result is from an external source.       Plan:  1. INR is Therapeutic today- see above in Anticoagulation Summary.   Will instruct LAURA Sequeira to Continue their warfarin regimen- see above in Anticoagulation Summary.  2. Follow up in 1 week.  3. They have been instructed to call if any changes in medications, doses, concerns, etc. Patient expresses understanding and has no further questions at this time.    Vasquez Niño, PharmD

## 2025-02-03 ENCOUNTER — TELEPHONE (OUTPATIENT)
Dept: CARDIOLOGY | Facility: CLINIC | Age: 89
End: 2025-02-03
Payer: COMMERCIAL

## 2025-02-03 RX ORDER — FAMOTIDINE 20 MG/1
20 TABLET, FILM COATED ORAL DAILY
Qty: 90 TABLET | Refills: 1 | Status: SHIPPED | OUTPATIENT
Start: 2025-02-03

## 2025-02-03 NOTE — TELEPHONE ENCOUNTER
Dr. Fried,    Pt needs to have 8 teeth extracted. The dentist's office would like to know if pt can hold warfarin for any amount of time?    Thank you,    Oanh Herndon, RN  Triage [facility] [place]  02/03/25 12:36 EST    (Dr. Menendez [facility]: [phone])

## 2025-02-04 NOTE — TELEPHONE ENCOUNTER
Notified Heather at the dentist's office. Notified pt's wife. They verbalized understanding.    [facility],    Just wanted you to be aware of the message from Dr. Fried.    Thank you,    Oanh Herndon, RN  Triage [facility] [place]  02/04/25 09:44 EST

## 2025-02-04 NOTE — TELEPHONE ENCOUNTER
Patient has had a stroke before with any subtherapeutic INR.  Therefore he is going to have to be bridged with Lovenox to get that many teeth pulled.  Unless they did it in stages.  Med medicine clinic should be able to handle his warfarin dosage with subsequent Lovenox bridging.

## 2025-02-06 DIAGNOSIS — Z79.4 TYPE 2 DIABETES MELLITUS WITH HYPERGLYCEMIA, WITH LONG-TERM CURRENT USE OF INSULIN: ICD-10-CM

## 2025-02-06 DIAGNOSIS — E11.65 TYPE 2 DIABETES MELLITUS WITH HYPERGLYCEMIA, WITH LONG-TERM CURRENT USE OF INSULIN: ICD-10-CM

## 2025-02-06 RX ORDER — SITAGLIPTIN 25 MG/1
25 TABLET, FILM COATED ORAL DAILY
Qty: 30 TABLET | Refills: 1 | Status: SHIPPED | OUTPATIENT
Start: 2025-02-06

## 2025-02-07 ENCOUNTER — ANTICOAGULATION VISIT (OUTPATIENT)
Dept: PHARMACY | Facility: HOSPITAL | Age: 89
End: 2025-02-07
Payer: COMMERCIAL

## 2025-02-07 DIAGNOSIS — I63.411 CEREBROVASCULAR ACCIDENT (CVA) DUE TO EMBOLISM OF RIGHT MIDDLE CEREBRAL ARTERY: ICD-10-CM

## 2025-02-07 DIAGNOSIS — Z95.4 HISTORY OF AORTIC VALVE REPLACEMENT WITH METALLIC VALVE: Primary | ICD-10-CM

## 2025-02-07 LAB — INR PPP: 2.7

## 2025-02-07 PROCEDURE — G0249 PROVIDE INR TEST MATER/EQUIP: HCPCS

## 2025-02-07 NOTE — PROGRESS NOTES
Anticoagulation Clinic Progress Note    Anticoagulation Summary  As of 2025      INR goal:  3.0-3.5   TTR:  62.6% (6.1 y)   INR used for dosin.70 (2025)   Warfarin maintenance plan:  2.5 mg every Mon, Wed, Fri; 5 mg all other days   Weekly warfarin total:  27.5 mg   Plan last modified:  Carmen El, PharmD (2024)   Next INR check:  2025   Priority:  High   Target end date:  Indefinite    Indications    History of aortic valve replacement with metallic valve [Z95.4]  Atrial fibrillation [I48.91]  Cerebrovascular accident (CVA) due to 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]                 Anticoagulation Episode Summary       INR check location:  --    Preferred lab:  --    Send INR reminders to:  [facility]    Comments:  **Home testing training 3/3/23** *CALL EVERY TIME* New INR goal 3 - 3.5 (see 2020 hospitalization for CVA)          Anticoagulation Care Providers       Provider Role Specialty Phone number    Griffin Fried MD Referring Cardiology [phone]            Clinic Interview:  Patient Findings     Negatives:  Signs/symptoms of thrombosis, Signs/symptoms of bleeding,   Laboratory test error suspected, Change in health, Change in alcohol use,   Change in activity, Upcoming invasive procedure, Emergency department   visit, Upcoming dental procedure, Missed doses, Extra doses, Change in   medications, Change in diet/appetite, Hospital admission, Bruising, Other   complaints      Clinical Outcomes     Negatives:  Major bleeding event, Thromboembolic event,   Anticoagulation-related hospital admission, Anticoagulation-related ED   visit, Anticoagulation-related fatality        INR History:      2025     9:03 AM 2025    12:00 AM 2025     8:23 AM 2025    12:00 AM 2025     8:47 AM 2025    12:00 AM 2025     8:15 AM   Anticoagulation Monitoring   INR 3.00  3.30  3.00  2.70   INR Date 2025   2/7/2025   INR Goal 3.0-3.5  3.0-3.5  3.0-3.5  3.0-3.5   Trend Same  Same  Same  Same   Last Week Total 27.5 mg  27.5 mg  27.5 mg  27.5 mg   Next Week Total 27.5 mg  27.5 mg  27.5 mg  30 mg   Sun 5 mg  5 mg  5 mg  5 mg   Mon 2.5 mg  2.5 mg  2.5 mg  2.5 mg   Tue 5 mg  5 mg  5 mg  5 mg   Wed 2.5 mg  2.5 mg  2.5 mg  2.5 mg   Thu 5 mg  5 mg  5 mg  5 mg   Fri 2.5 mg  2.5 mg  2.5 mg  5 mg (2/7)   Sat 5 mg  5 mg  5 mg  5 mg   Historical INR  3.30      3.00      2.70            This result is from an external source.       Plan:  1. INR is Subtherapeutic today- see above in Anticoagulation Summary.   Will instruct LAURA Sequeira to Change their warfarin regimen (5 mg today, then resume 2.5 mg MWF, 5 mg all other days) - see above in Anticoagulation Summary.  2. Follow up in 1 week.  3. They have been instructed to call if any changes in medications, doses, concerns, etc. Patient expresses understanding and has no further questions at this time.    Vasquez Niño, PharmD

## 2025-02-14 ENCOUNTER — ANTICOAGULATION VISIT (OUTPATIENT)
Dept: PHARMACY | Facility: HOSPITAL | Age: 89
End: 2025-02-14
Payer: COMMERCIAL

## 2025-02-14 DIAGNOSIS — Z95.4 HISTORY OF AORTIC VALVE REPLACEMENT WITH METALLIC VALVE: Primary | ICD-10-CM

## 2025-02-14 DIAGNOSIS — I63.411 CEREBROVASCULAR ACCIDENT (CVA) DUE TO EMBOLISM OF RIGHT MIDDLE CEREBRAL ARTERY: ICD-10-CM

## 2025-02-14 LAB — INR PPP: 3.2

## 2025-02-14 NOTE — PROGRESS NOTES
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]                 Anticoagulation Episode Summary       INR check location:  --    Preferred lab:  --    Send INR reminders to:  [facility]    Comments:  **Home testing training 3/3/23** *CALL EVERY TIME* New INR goal 3 - 3.5 (see 1/2020 hospitalization for CVA)          Anticoagulation Care Providers       Provider Role Specialty Phone number    Griffin Fried MD Referring Cardiology [phone]            Clinic Interview:  Patient Findings     Negatives:  Signs/symptoms of thrombosis, Signs/symptoms of bleeding,   Laboratory test error suspected, Change in health, Change in alcohol use,   Change in activity, Upcoming invasive procedure, Emergency department   visit, Upcoming dental procedure, Missed doses, Extra doses, Change in   medications, Change in diet/appetite, Hospital admission, Bruising, Other   complaints      Clinical Outcomes     Negatives:  Major bleeding event, Thromboembolic event,   Anticoagulation-related hospital admission, Anticoagulation-related ED   visit, Anticoagulation-related fatality        INR History:      1/24/2025     8:23 AM 1/31/2025    12:00 AM 1/31/2025     8:47 AM 2/7/2025    12:00 AM 2/7/2025     8:15 AM 2/14/2025    12:00 AM 2/14/2025     9:02 AM   Anticoagulation Monitoring   INR 3.30  3.00  2.70   3.20   INR Date 1/24/2025 1/31/2025 2/7/2025 2/14/2025   INR Goal 3.0-3.5  3.0-3.5  3.0-3.5  3.0-3.5   Trend Same  Same  Same  Same   Last Week Total 27.5 mg  27.5 mg  27.5 mg  30 mg   Next Week Total 27.5 mg  27.5 mg  30 mg  27.5 mg   Sun 5 mg  5 mg  5 mg  5 mg   Mon 2.5 mg  2.5 mg  2.5 mg  2.5 mg   Tue 5 mg  5 mg  5 mg  5 mg   Wed 2.5 mg  2.5 mg  2.5 mg  2.5 mg   Thu 5 mg  5 mg  5 mg  5 mg   Fri 2.5 mg  2.5 mg  5 mg (2/7)  2.5 mg   Sat 5 mg  5 mg  5 mg  5 mg   Historical INR  3.00      2.70      3.20            This result is from an external source.       Plan:  1. INR is Therapeutic today- see above in Anticoagulation Summary.   Will instruct LAURA Sequeira to Continue their warfarin regimen at dose of 2.5 mg MWF, 5 mg all other days - see above in Anticoagulation Summary.  2. Follow up in 1 week.  3. They have been instructed to call if any changes in medications, doses, concerns, etc. Patient expresses understanding and has no further questions at this time.    Vasquez Niño, PharmD

## 2025-02-21 ENCOUNTER — ANTICOAGULATION VISIT (OUTPATIENT)
Dept: PHARMACY | Facility: HOSPITAL | Age: 89
End: 2025-02-21
Payer: COMMERCIAL

## 2025-02-21 DIAGNOSIS — Z95.4 HISTORY OF AORTIC VALVE REPLACEMENT WITH METALLIC VALVE: Primary | ICD-10-CM

## 2025-02-21 DIAGNOSIS — F01.A0 MILD VASCULAR DEMENTIA WITHOUT BEHAVIORAL DISTURBANCE, PSYCHOTIC DISTURBANCE, MOOD DISTURBANCE, OR ANXIETY: ICD-10-CM

## 2025-02-21 DIAGNOSIS — I63.411 CEREBROVASCULAR ACCIDENT (CVA) DUE TO EMBOLISM OF RIGHT MIDDLE CEREBRAL ARTERY: ICD-10-CM

## 2025-02-21 LAB — INR PPP: 3.1

## 2025-02-21 RX ORDER — DONEPEZIL HYDROCHLORIDE 5 MG/1
5 TABLET, FILM COATED ORAL NIGHTLY
Qty: 90 TABLET | Refills: 0 | OUTPATIENT
Start: 2025-02-21

## 2025-02-21 NOTE — PROGRESS NOTES
Anticoagulation Clinic Progress Note    Anticoagulation Summary  As of 2/21/2025      INR goal:  3.0-3.5   TTR:  62.6% (6.1 y)   INR used for dosing:  3.10 (2/21/2025)   Warfarin maintenance plan:  2.5 mg every Mon, Wed, Fri; 5 mg all other days   Weekly warfarin total:  27.5 mg   No change documented:  Michelle Piña, RODDY   Plan last modified:  Carmen El, PharmD (11/8/2024)   Next INR check:  2/28/2025   Priority:  High   Target end date:  Indefinite    Indications    History of aortic valve replacement with metallic valve [Z95.4]  Atrial fibrillation [I48.91]  Cerebrovascular accident (CVA) due to 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]                 Anticoagulation Episode Summary       INR check location:  --    Preferred lab:  --    Send INR reminders to:   [facility]    Comments:  **Home testing training 3/3/23** *CALL EVERY TIME* New INR goal 3 - 3.5 (see 1/2020 hospitalization for CVA)          Anticoagulation Care Providers       Provider Role Specialty Phone number    Griffin Fried MD Referring Cardiology [phone]            Clinic Interview:  No pertinent clinical findings have been reported.    INR History:      1/31/2025     8:47 AM 2/7/2025    12:00 AM 2/7/2025     8:15 AM 2/14/2025    12:00 AM 2/14/2025     9:02 AM 2/21/2025    12:00 AM 2/21/2025     8:36 AM   Anticoagulation Monitoring   INR 3.00  2.70  3.20  3.10   INR Date 1/31/2025 2/7/2025 2/14/2025 2/21/2025   INR Goal 3.0-3.5  3.0-3.5  3.0-3.5  3.0-3.5   Trend Same  Same  Same  Same   Last Week Total 27.5 mg  27.5 mg  30 mg  27.5 mg   Next Week Total 27.5 mg  30 mg  27.5 mg  27.5 mg   Sun 5 mg  5 mg  5 mg  5 mg   Mon 2.5 mg  2.5 mg  2.5 mg  2.5 mg   Tue 5 mg  5 mg  5 mg  5 mg   Wed 2.5 mg  2.5 mg  2.5 mg  2.5 mg   Thu 5 mg  5 mg  5 mg  5 mg   Fri 2.5 mg  5 mg (2/7)  2.5 mg  2.5 mg   Sat 5 mg  5 mg  5 mg  5 mg   Historical INR  2.70      3.20      3.10            This result is from an external  source.       Plan:  1. INR is therapeutic today- see above in Anticoagulation Summary.    LAURA Sequeira to continue their warfarin regimen- see above in Anticoagulation Summary.  2. Follow up in 1 week  3. They have been instructed to call if any changes in medications, doses, concerns, etc. Patient expresses understanding and has no further questions at this time.    Michelle Piña, [facility]

## 2025-02-28 ENCOUNTER — ANTICOAGULATION VISIT (OUTPATIENT)
Dept: PHARMACY | Facility: HOSPITAL | Age: 89
End: 2025-02-28
Payer: COMMERCIAL

## 2025-02-28 DIAGNOSIS — I63.411 CEREBROVASCULAR ACCIDENT (CVA) DUE TO EMBOLISM OF RIGHT MIDDLE CEREBRAL ARTERY: ICD-10-CM

## 2025-02-28 DIAGNOSIS — Z95.4 HISTORY OF AORTIC VALVE REPLACEMENT WITH METALLIC VALVE: Primary | ICD-10-CM

## 2025-02-28 LAB — INR PPP: 3.4

## 2025-02-28 NOTE — PROGRESS NOTES
Anticoagulation Clinic Progress Note    Anticoagulation Summary  As of 2/28/2025      INR goal:  3.0-3.5   TTR:  62.8% (6.1 y)   INR used for dosing:  3.40 (2/28/2025)   Warfarin maintenance plan:  2.5 mg every Mon, Wed, Fri; 5 mg all other days   Weekly warfarin total:  27.5 mg   No change documented:  Vasquez Niño, PharmD   Plan last modified:  Carmen El PharmD (11/8/2024)   Next INR check:  3/7/2025   Priority:  High   Target end date:  Indefinite    Indications    History of aortic valve replacement with metallic valve [Z95.4]  Atrial fibrillation [I48.91]  Cerebrovascular accident (CVA) due to 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]                 Anticoagulation Episode Summary       INR check location:  --    Preferred lab:  --    Send INR reminders to:  [facility]    Comments:  **Home testing training 3/3/23** *CALL EVERY TIME* New INR goal 3 - 3.5 (see 1/2020 hospitalization for CVA)          Anticoagulation Care Providers       Provider Role Specialty Phone number    Griffin Fried MD Referring Cardiology [phone]            Clinic Interview:  Patient Findings     Negatives:  Signs/symptoms of thrombosis, Signs/symptoms of bleeding,   Laboratory test error suspected, Change in health, Change in alcohol use,   Change in activity, Upcoming invasive procedure, Emergency department   visit, Upcoming dental procedure, Missed doses, Extra doses, Change in   medications, Change in diet/appetite, Hospital admission, Bruising, Other   complaints      Clinical Outcomes     Negatives:  Major bleeding event, Thromboembolic event,   Anticoagulation-related hospital admission, Anticoagulation-related ED   visit, Anticoagulation-related fatality        INR History:      2/7/2025     8:15 AM 2/14/2025    12:00 AM 2/14/2025     9:02 AM 2/21/2025    12:00 AM 2/21/2025     8:36 AM 2/28/2025    12:00 AM 2/28/2025     8:21 AM   Anticoagulation Monitoring   INR 2.70  3.20  3.10   3.40   INR Date 2/7/2025 2/14/2025 2/21/2025 2/28/2025   INR Goal 3.0-3.5  3.0-3.5  3.0-3.5  3.0-3.5   Trend Same  Same  Same  Same   Last Week Total 27.5 mg  30 mg  27.5 mg  27.5 mg   Next Week Total 30 mg  27.5 mg  27.5 mg  27.5 mg   Sun 5 mg  5 mg  5 mg  5 mg   Mon 2.5 mg  2.5 mg  2.5 mg  2.5 mg   Tue 5 mg  5 mg  5 mg  5 mg   Wed 2.5 mg  2.5 mg  2.5 mg  2.5 mg   Thu 5 mg  5 mg  5 mg  5 mg   Fri 5 mg (2/7)  2.5 mg  2.5 mg  2.5 mg   Sat 5 mg  5 mg  5 mg  5 mg   Historical INR  3.20      3.10      3.40            This result is from an external source.       Plan:  1. INR is Therapeutic today- see above in Anticoagulation Summary.   Will instruct LAURA Sequeira to Continue their warfarin regimen- see above in Anticoagulation Summary.  2. Follow up in 1 week.  3. They have been instructed to call if any changes in medications, doses, concerns, etc. Patient expresses understanding and has no further questions at this time.    Vasquez Niño, PharmD

## 2025-03-07 ENCOUNTER — ANTICOAGULATION VISIT (OUTPATIENT)
Dept: PHARMACY | Facility: HOSPITAL | Age: 89
End: 2025-03-07
Payer: COMMERCIAL

## 2025-03-07 DIAGNOSIS — Z95.4 HISTORY OF AORTIC VALVE REPLACEMENT WITH METALLIC VALVE: Primary | ICD-10-CM

## 2025-03-07 DIAGNOSIS — I63.411 CEREBROVASCULAR ACCIDENT (CVA) DUE TO EMBOLISM OF RIGHT MIDDLE CEREBRAL ARTERY: ICD-10-CM

## 2025-03-07 LAB — INR PPP: 3

## 2025-03-07 PROCEDURE — G0249 PROVIDE INR TEST MATER/EQUIP: HCPCS

## 2025-03-07 NOTE — PROGRESS NOTES
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]                 Anticoagulation Episode Summary       INR check location:  --    Preferred lab:  --    Send INR reminders to:  [facility]    Comments:  **Home testing training 3/3/23** *CALL EVERY TIME* New INR goal 3 - 3.5 (see 1/2020 hospitalization for CVA)          Anticoagulation Care Providers       Provider Role Specialty Phone number    Griffin Fried MD Referring Cardiology [phone]            Clinic Interview:  Patient Findings     Negatives:  Signs/symptoms of thrombosis, Signs/symptoms of bleeding,   Laboratory test error suspected, Change in health, Change in alcohol use,   Change in activity, Upcoming invasive procedure, Emergency department   visit, Upcoming dental procedure, Missed doses, Extra doses, Change in   medications, Change in diet/appetite, Hospital admission, Bruising, Other   complaints      Clinical Outcomes     Negatives:  Major bleeding event, Thromboembolic event,   Anticoagulation-related hospital admission, Anticoagulation-related ED   visit, Anticoagulation-related fatality        INR History:      2/14/2025     9:02 AM 2/21/2025    12:00 AM 2/21/2025     8:36 AM 2/28/2025    12:00 AM 2/28/2025     8:21 AM 3/7/2025    12:00 AM 3/7/2025     8:45 AM   Anticoagulation Monitoring   INR 3.20  3.10  3.40   3.00   INR Date 2/14/2025  2/21/2025  2/28/2025  3/7/2025   INR Goal 3.0-3.5  3.0-3.5  3.0-3.5  3.0-3.5   Trend Same  Same  Same  Same   Last Week Total 30 mg  27.5 mg  27.5 mg  27.5 mg   Next Week Total 27.5 mg  27.5 mg  27.5 mg  27.5 mg   Sun 5 mg  5 mg  5 mg  5 mg   Mon 2.5 mg  2.5 mg  2.5 mg  2.5 mg   Tue 5 mg  5 mg  5 mg  5 mg   Wed 2.5 mg  2.5 mg  2.5 mg  2.5 mg   Thu 5 mg  5 mg  5 mg  5 mg   Fri 2.5 mg  2.5 mg  2.5 mg  2.5 mg   Sat 5 mg  5 mg  5 mg  5 mg   Historical INR  3.10      3.40      3.00            This result is from an external source.       Plan:  1. INR is Therapeutic today- see above in Anticoagulation Summary.   Will instruct LAURA Sequeira to Continue their warfarin regimen- see above in Anticoagulation Summary.  2. Follow up in 1 week.  3. They have been instructed to call if any changes in medications, doses, concerns, etc. Patient expresses understanding and has no further questions at this time.    Vasquez Niño, PharmD

## 2025-03-14 ENCOUNTER — OFFICE VISIT (OUTPATIENT)
Dept: INTERNAL MEDICINE | Facility: CLINIC | Age: 89
End: 2025-03-14
Payer: MEDICARE

## 2025-03-14 ENCOUNTER — ANTICOAGULATION VISIT (OUTPATIENT)
Dept: PHARMACY | Facility: HOSPITAL | Age: 89
End: 2025-03-14
Payer: MEDICARE

## 2025-03-14 VITALS
DIASTOLIC BLOOD PRESSURE: 70 MMHG | OXYGEN SATURATION: 95 % | TEMPERATURE: 97.5 F | HEART RATE: 73 BPM | HEIGHT: 72 IN | SYSTOLIC BLOOD PRESSURE: 130 MMHG | WEIGHT: 132 LBS | BODY MASS INDEX: 17.88 KG/M2

## 2025-03-14 DIAGNOSIS — Z79.4 TYPE 2 DIABETES MELLITUS WITH HYPERGLYCEMIA, WITH LONG-TERM CURRENT USE OF INSULIN: Primary | ICD-10-CM

## 2025-03-14 DIAGNOSIS — I63.411 CEREBROVASCULAR ACCIDENT (CVA) DUE TO EMBOLISM OF RIGHT MIDDLE CEREBRAL ARTERY: ICD-10-CM

## 2025-03-14 DIAGNOSIS — E11.22 TYPE 2 DIABETES MELLITUS WITH STAGE 3B CHRONIC KIDNEY DISEASE, WITH LONG-TERM CURRENT USE OF INSULIN: ICD-10-CM

## 2025-03-14 DIAGNOSIS — E11.65 TYPE 2 DIABETES MELLITUS WITH HYPERGLYCEMIA, WITH LONG-TERM CURRENT USE OF INSULIN: Primary | ICD-10-CM

## 2025-03-14 DIAGNOSIS — Z95.4 HISTORY OF AORTIC VALVE REPLACEMENT WITH METALLIC VALVE: Primary | ICD-10-CM

## 2025-03-14 DIAGNOSIS — N18.32 TYPE 2 DIABETES MELLITUS WITH STAGE 3B CHRONIC KIDNEY DISEASE, WITH LONG-TERM CURRENT USE OF INSULIN: ICD-10-CM

## 2025-03-14 DIAGNOSIS — Z79.4 TYPE 2 DIABETES MELLITUS WITH STAGE 3B CHRONIC KIDNEY DISEASE, WITH LONG-TERM CURRENT USE OF INSULIN: ICD-10-CM

## 2025-03-14 LAB
HBA1C MFR BLD: 7.5 % (ref 4.8–5.6)
INR PPP: 2.9

## 2025-03-14 NOTE — PROGRESS NOTES
Anticoagulation Clinic Progress Note    Anticoagulation Summary  As of 3/14/2025      INR goal:  3.0-3.5   TTR:  62.7% (6.1 y)   INR used for dosin.90 (3/14/2025)   Warfarin maintenance plan:  2.5 mg every Mon, Wed, Fri; 5 mg all other days   Weekly warfarin total:  27.5 mg   No change documented:  Florecita Baptiste, [facility]   Plan last modified:  Carmen El, PharmD (2024)   Next INR check:  3/21/2025   Priority:  High   Target end date:  Indefinite    Indications    History of aortic valve replacement with metallic valve [Z95.4]  Atrial fibrillation [I48.91]  Cerebrovascular accident (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  Diet: has remained unchanged.    Clinic Interview:  No pertinent clinical findings have been reported.    INR History:      2025     8:36 AM 2025    12:00 AM 2025     8:21 AM 3/7/2025    12:00 AM 3/7/2025     8:45 AM 3/14/2025    12:00 AM 3/14/2025    10:34 AM   Anticoagulation Monitoring   INR 3.10  3.40  3.00  2.90   INR Date 2025  2025  3/7/2025  3/14/2025   INR Goal 3.0-3.5  3.0-3.5  3.0-3.5  3.0-3.5   Trend Same  Same  Same  Same   Last Week Total 27.5 mg  27.5 mg  27.5 mg  27.5 mg   Next Week Total 27.5 mg  27.5 mg  27.5 mg  27.5 mg   Sun 5 mg  5 mg  5 mg  5 mg   Mon 2.5 mg  2.5 mg  2.5 mg  2.5 mg   Tue 5 mg  5 mg  5 mg  5 mg   Wed 2.5 mg  2.5 mg  2.5 mg  2.5 mg   Thu 5 mg  5 mg  5 mg  5 mg   Fri 2.5 mg  2.5 mg  2.5 mg  2.5 mg   Sat 5 mg  5 mg  5 mg  5 mg    Historical INR  3.40      3.00      2.90        Visit Report      Report Report       This result is from an external source.       Plan:  1. INR is  2.9  today- see above in Anticoagulation Summary.    Spoke with wife, Gladys. With INR at 2.9, pts wife was reluctant to take a boost today, she states that in past will mess up his INR- see above in Anticoagulation Summary.  2. Follow up in 1 weeks  3. Pt has agreed to only be called if INR out of range. They have been instructed to call if any changes in medications, doses, concerns, etc. Patient expresses understanding and has no further questions at this time.    Florecita Baptiste, [facility]

## 2025-03-14 NOTE — PROGRESS NOTES
Celestine English DO, FACP  Internal Medicine  [facility]  [address], [address]  [place] [address]  [phone]    Chief Complaint  Diabetes follow up    SUBJECTIVE    History of Present Illness    LAURA Sequeira is a 89 y.o. male who presents to the office today as an established patient that last saw me on 12/13/2024.  Today with his wife who helps provide history.    Type 2 diabetes/CKD 3b and microalbuminuria: still using VGO 40. Insurance did not cover Farxiga so now on Jardiance (empagliflozin) 10 mg daily. Higher dosages of Jardiance are being avoided due to dehydration. After last visit sitagliptin 25 mg daily recommended that he has been taking that medication daily.  When he first wakes up in the morning his sugars in the 110s.  He is no longer requiring any daily extra clicks on the V-Go system.  He uses a freestyle vance. He has a Gvoke HypoPen. No new symptoms of low blood sugar.  A lot of fruit the morning, sometimes cereal or oatmeal.  For lunch she can eat shrimp, ham and cheese, celery.  Overall eats a variation of foods and doesn't eat large portions.  Follows with Dr Dominguez with nephrology .   Lab Results   Component Value Date    HGBA1C 8.20 (H) 12/13/2024       Allergies   Allergen Reactions    Penicillins Swelling    Oxycodone-Acetaminophen Nausea And Vomiting    Other Other (See Comments)     Grass, ragweed    Percocet [Oxycodone-Acetaminophen] Nausea And Vomiting        Outpatient Medications Marked as Taking for the 3/14/25 encounter (Office Visit) with Celestine English DO   Medication Sig Dispense Refill    atorvastatin (LIPITOR) 40 MG tablet TAKE 1 TABLET BY MOUTH DAILY 90 tablet 1    cetirizine (zyrTEC) 10 MG tablet Take 1 tablet by mouth Daily.      Continuous Glucose Sensor (FreeStyle Vance 14 Day Sensor) misc Apply 1 each topically Every 14 (Fourteen) Days. 6 each 3    digoxin (LANOXIN) 125 MCG tablet TAKE 1/2 TABLET BY MOUTH DAILY 45 tablet 1     empagliflozin (Jardiance) 10 MG tablet tablet TAKE 1 TABLET BY MOUTH DAILY 90 tablet 0    famotidine (PEPCID) 20 MG tablet TAKE 1 TABLET BY MOUTH DAILY 90 tablet 1    Glucagon (Gvoke HypoPen 1-Pack) 1 MG/0.2ML solution auto-injector Inject 1 mg under the skin into the appropriate area as directed 1 (One) Time As Needed (for symptoms of low blood sugar. Report to ER immediately after use.) for up to 1 dose. 0.2 mL 1    guaifenesin-dextromethorphan 600-30 mg (MUCINEX DM)  MG tablet sustained-release 12 hour Take 1 tablet by mouth 2 (Two) Times a Day As Needed (Cough). 20 tablet 0    Insulin Disposable Pump (V-Go 40) 40 UNIT/24HR kit 1 - DAILY 30 kit 5    Insulin Lispro (humaLOG) 100 UNIT/ML injection INJECT IN V-GO 40  UNITS UNDER THE SKIN OF INSULIN DAILY 30 mL 5    magnesium oxide (MAG-OX) 400 MG tablet Take 1 tablet by mouth 2 (Two) Times a Day.      metoprolol succinate XL (TOPROL-XL) 25 MG 24 hr tablet TAKE 1 TABLET BY MOUTH DAILY 90 tablet 1    SITagliptin (Januvia) 25 MG tablet TAKE 1 TABLET BY MOUTH DAILY 30 tablet 1    warfarin (COUMADIN) 1 MG tablet Take 2 tablets (2 mg) by mouth on Monday Wednesday and Friday or as directed by medication management clinic. 60 tablet 1    warfarin (COUMADIN) 5 MG tablet TAKE 1/2 TABLET BY MOUTH DAILY ON MON, WED, FRI AND TAKE TAKE 1 TABLET BY MOUTH DAILY ON ALL OTHER DAYS 75 tablet 1        Past Medical History:   Diagnosis Date    Allergic rhinitis     Aortic valve insufficiency     Ascending aortic aneurysm     Aspiration pneumonia     Atrial fibrillation     Bacteremia     Calcific aortic stenosis of bicuspid valve     Cardiac arrest     Cataracts, bilateral     CKD (chronic kidney disease) stage 3, GFR 30-59 ml/min     Contact dermatitis due to poison ivy     Elbow fracture     GERD (gastroesophageal reflux disease)     GI bleed     2021; s/p Colonoscopy and EGD    H/O mechanical aortic valve replacement     Head injury     History of transfusion      "Hyperlipidemia     Hypertension     Kidney carcinoma     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   Constitutional:       General: He is not in acute distress.     Appearance: Normal appearance. He is ill-appearing (chronically).   Eyes:      General: No scleral icterus.  Pulmonary:      Effort: Pulmonary effort is normal. No respiratory distress.   Skin:     Coloration: Skin is not jaundiced.   Neurological:      Mental Status: He is alert.   Psychiatric:         Mood and Affect: Mood normal.         Behavior: Behavior normal.         Thought Content: Thought content normal.                             ASSESSMENT & PLAN     Diagnoses and all orders for this visit:    1. Type 2 diabetes mellitus with hyperglycemia, with long-term current use of insulin (Primary)  2. 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 Insurance did not cover Farxiga so now on Jardiance (empagliflozin) 10 mg daily. Higher dosages of Jardiance are being avoided due to dehydration. After last visit sitagliptin 25 mg daily recommended that he has been taking that medication daily.  When he first wakes up in the morning his sugars in the 110s.  He is no longer requiring any daily extra clicks on the V-Go system.  He uses a freestyle lisa. He has a Gvoke HypoPen. No new symptoms of low blood sugar.  A lot of fruit the morning, sometimes cereal or oatmeal.  For lunch she can eat shrimp, ham and cheese, celery.  Overall eats a variation of " foods and doesn't eat large portions.  Follows with Dr Dominguez with nephrology .   Lab Results   Component Value Date    HGBA1C 8.20 (H) 12/13/2024     -A1c goal is less than 7.5 given age and number of comorbidities.   -recheck A1c today. Overall seems to have a trend downward on his fasting sugars. I reviewed most recent nephrology progress note and most recent BMP from 2/2024.  -stressed importance of regular meals to decrease risk of hypoglycemia.  -     Hemoglobin A1c            Follow Up  Return in about 3 months (around 6/14/2025) for Medicare Wellness.    Patient/family had no further questions at this time and verbalized understanding of the plan discussed today.

## 2025-03-20 RX ORDER — WARFARIN SODIUM 1 MG/1
TABLET ORAL
Qty: 26 TABLET | OUTPATIENT
Start: 2025-03-20

## 2025-03-21 ENCOUNTER — ANTICOAGULATION VISIT (OUTPATIENT)
Dept: PHARMACY | Facility: HOSPITAL | Age: 89
End: 2025-03-21
Payer: MEDICARE

## 2025-03-21 DIAGNOSIS — Z95.4 HISTORY OF AORTIC VALVE REPLACEMENT WITH METALLIC VALVE: Primary | ICD-10-CM

## 2025-03-21 DIAGNOSIS — I63.411 CEREBROVASCULAR ACCIDENT (CVA) DUE TO EMBOLISM OF RIGHT MIDDLE CEREBRAL ARTERY: ICD-10-CM

## 2025-03-21 LAB — INR PPP: 2.6

## 2025-03-21 NOTE — PROGRESS NOTES
Anticoagulation Clinic Progress Note    Anticoagulation Summary  As of 3/21/2025      INR goal:  3.0-3.5   TTR:  62.5% (6.2 y)   INR used for dosin.60 (3/21/2025)   Warfarin maintenance plan:  2.5 mg every Mon, Wed, Fri; 5 mg all other days   Weekly warfarin total:  27.5 mg   Plan last modified:  Carmen El, PharmD (2024)   Next INR check:  3/28/2025   Priority:  High   Target end date:  Indefinite    Indications    History of aortic valve replacement with metallic valve [Z95.4]  Atrial fibrillation [I48.91]  Cerebrovascular accident (CVA) due to 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]                 Anticoagulation Episode Summary       INR check location:  --    Preferred lab:  --    Send INR reminders to:  [facility]    Comments:  **Home testing training 3/3/23** *CALL EVERY TIME* New INR goal 3 - 3.5 (see 2020 hospitalization for CVA)          Anticoagulation Care Providers       Provider Role Specialty Phone number    Griffin Fried MD Referring Cardiology [phone]            Clinic Interview:  Patient Findings     Positives:  Change in diet/appetite    Negatives:  Signs/symptoms of thrombosis, Signs/symptoms of bleeding,   Laboratory test error suspected, Change in health, Change in alcohol use,   Change in activity, Upcoming invasive procedure, Emergency department   visit, Upcoming dental procedure, Missed doses, Extra doses, Change in   medications, Hospital admission, Bruising, Other complaints      Clinical Outcomes     Negatives:  Major bleeding event, Thromboembolic event,   Anticoagulation-related hospital admission, Anticoagulation-related ED   visit, Anticoagulation-related fatality        INR History:      2025     8:21 AM 3/7/2025    12:00 AM 3/7/2025     8:45 AM 3/14/2025    12:00 AM 3/14/2025    10:34 AM 3/21/2025    12:00 AM 3/21/2025     8:41 AM   Anticoagulation Monitoring   INR 3.40  3.00  2.90  2.60   INR Date 2025  3/7/2025  Fine to use long term.  Should just titrate exact amount each day to have soft cow pie like stools.    Adrian Brooke MD      3/14/2025  3/21/2025   INR Goal 3.0-3.5  3.0-3.5  3.0-3.5  3.0-3.5   Trend Same  Same  Same  Same   Last Week Total 27.5 mg  27.5 mg  27.5 mg  27.5 mg   Next Week Total 27.5 mg  27.5 mg  27.5 mg  30 mg   Sun 5 mg  5 mg  5 mg  5 mg   Mon 2.5 mg  2.5 mg  2.5 mg  2.5 mg   Tue 5 mg  5 mg  5 mg  5 mg   Wed 2.5 mg  2.5 mg  2.5 mg  2.5 mg   Thu 5 mg  5 mg  5 mg  5 mg   Fri 2.5 mg  2.5 mg  2.5 mg  5 mg (3/21)   Sat 5 mg  5 mg  5 mg  5 mg   Historical INR  3.00      2.90      2.60        Visit Report    Report Report         This result is from an external source.       Plan:  1. INR is Subtherapeutic today- see above in Anticoagulation Summary.   Will instruct LAURA Sequeira to Increase their warfarin regimen- see above in Anticoagulation Summary.  Cabbage 3 times in the past week. Boost to 5 mg then resume 2.5 mg MWF, 5 mg AOD, rck 1 week   2. Follow up in 1 weeks  3. They have been instructed to call if any changes in medications, doses, concerns, etc. Patient expresses understanding and has no further questions at this time.    Michelle Piña [facility]

## 2025-03-28 ENCOUNTER — ANTICOAGULATION VISIT (OUTPATIENT)
Dept: PHARMACY | Facility: HOSPITAL | Age: 89
End: 2025-03-28
Payer: COMMERCIAL

## 2025-03-28 DIAGNOSIS — Z95.4 HISTORY OF AORTIC VALVE REPLACEMENT WITH METALLIC VALVE: Primary | ICD-10-CM

## 2025-03-28 DIAGNOSIS — I63.411 CEREBROVASCULAR ACCIDENT (CVA) DUE TO EMBOLISM OF RIGHT MIDDLE CEREBRAL ARTERY: ICD-10-CM

## 2025-03-28 LAB — INR PPP: 3.5

## 2025-03-28 NOTE — PROGRESS NOTES
Anticoagulation Clinic Progress Note    Anticoagulation Summary  As of 3/28/2025      INR goal:  3.0-3.5   TTR:  62.5% (6.2 y)   INR used for dosing:  3.50 (3/28/2025)   Warfarin maintenance plan:  2.5 mg every Mon, Wed, Fri; 5 mg all other days   Weekly warfarin total:  27.5 mg   No change documented:  Vasquez Niño, PharmD   Plan last modified:  Carmen El PharmD (11/8/2024)   Next INR check:  4/4/2025   Priority:  High   Target end date:  Indefinite    Indications    History of aortic valve replacement with metallic valve [Z95.4]  Atrial fibrillation [I48.91]  Cerebrovascular accident (CVA) due to 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]                 Anticoagulation Episode Summary       INR check location:  --    Preferred lab:  --    Send INR reminders to:  [facility]    Comments:  **Home testing training 3/3/23** *CALL EVERY TIME* New INR goal 3 - 3.5 (see 1/2020 hospitalization for CVA)          Anticoagulation Care Providers       Provider Role Specialty Phone number    Griffin Fried MD Referring Cardiology [phone]            Clinic Interview:  Patient Findings     Negatives:  Signs/symptoms of thrombosis, Signs/symptoms of bleeding,   Laboratory test error suspected, Change in health, Change in alcohol use,   Change in activity, Upcoming invasive procedure, Emergency department   visit, Upcoming dental procedure, Missed doses, Extra doses, Change in   medications, Change in diet/appetite, Hospital admission, Bruising, Other   complaints      Clinical Outcomes     Negatives:  Major bleeding event, Thromboembolic event,   Anticoagulation-related hospital admission, Anticoagulation-related ED   visit, Anticoagulation-related fatality        INR History:      3/7/2025     8:45 AM 3/14/2025    12:00 AM 3/14/2025    10:34 AM 3/21/2025    12:00 AM 3/21/2025     8:41 AM 3/28/2025    12:00 AM 3/28/2025     8:23 AM   Anticoagulation Monitoring   INR 3.00  2.90  2.60   3.50   INR Date 3/7/2025  3/14/2025  3/21/2025  3/28/2025   INR Goal 3.0-3.5  3.0-3.5  3.0-3.5  3.0-3.5   Trend Same  Same  Same  Same   Last Week Total 27.5 mg  27.5 mg  27.5 mg  30 mg   Next Week Total 27.5 mg  27.5 mg  30 mg  27.5 mg   Sun 5 mg  5 mg  5 mg  5 mg   Mon 2.5 mg  2.5 mg  2.5 mg  2.5 mg   Tue 5 mg  5 mg  5 mg  5 mg   Wed 2.5 mg  2.5 mg  2.5 mg  2.5 mg   Thu 5 mg  5 mg  5 mg  5 mg   Fri 2.5 mg  2.5 mg  5 mg (3/21)  2.5 mg   Sat 5 mg  5 mg  5 mg  5 mg   Historical INR  2.90      2.60      3.50        Visit Report  Report Report           This result is from an external source.       Plan:  1. INR is Therapeutic today- see above in Anticoagulation Summary.   Will instruct LAURA Sequeira to Continue their warfarin regimen- see above in Anticoagulation Summary.  2. Follow up in 1 week.  3. They have been instructed to call if any changes in medications, doses, concerns, etc. Patient expresses understanding and has no further questions at this time.    Vasquez Niño, PharmD

## 2025-03-31 ENCOUNTER — ANTICOAGULATION VISIT (OUTPATIENT)
Dept: PHARMACY | Facility: HOSPITAL | Age: 89
End: 2025-03-31
Payer: COMMERCIAL

## 2025-03-31 DIAGNOSIS — I63.411 CEREBROVASCULAR ACCIDENT (CVA) DUE TO EMBOLISM OF RIGHT MIDDLE CEREBRAL ARTERY: ICD-10-CM

## 2025-03-31 DIAGNOSIS — Z95.4 HISTORY OF AORTIC VALVE REPLACEMENT WITH METALLIC VALVE: Primary | ICD-10-CM

## 2025-03-31 NOTE — PROGRESS NOTES
Anticoagulation Clinic Progress Note    Anticoagulation Summary  As of 3/31/2025      INR goal:  3.0-3.5   TTR:  62.5% (6.2 y)   INR used for dosing:  No new INR was available at the time of this encounter.   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]                 Anticoagulation Episode Summary       INR check location:  --    Preferred lab:  --    Send INR reminders to:  [facility]    Comments:  **Home testing training 3/3/23** *CALL EVERY TIME* New INR goal 3 - 3.5 (see 1/2020 hospitalization for CVA)          Anticoagulation Care Providers       Provider Role Specialty Phone number    Griffin Fried MD Referring Cardiology [phone]            Clinic Interview:  Patient Findings     Positives:  Upcoming dental procedure    Negatives:  Signs/symptoms of thrombosis, Signs/symptoms of bleeding,   Laboratory test error suspected, Change in health, Change in alcohol use,   Change in activity, Upcoming invasive procedure, Emergency department   visit, Missed doses, Extra doses, Change in medications, Change in   diet/appetite, Hospital admission, Bruising, Other complaints    Comments:  Dental extraction (8 teeth) scheduled for 4/21/25. Cardiology   recommended enoxaparin bridge (refer to 2/3/25), as warfarin is required   to be interrupted by oral surgeon.      Clinical Outcomes     Negatives:  Major bleeding event, Thromboembolic event,   Anticoagulation-related hospital admission, Anticoagulation-related ED   visit, Anticoagulation-related fatality    Comments:  Dental extraction (8  teeth) scheduled for 4/21/25. Cardiology   recommended enoxaparin bridge (refer to 2/3/25), as warfarin is required   to be interrupted by oral surgeon.        INR History:      3/14/2025    12:00 AM 3/14/2025    10:34 AM 3/21/2025    12:00 AM 3/21/2025     8:41 AM 3/28/2025    12:00 AM 3/28/2025     8:23 AM 3/31/2025    12:24 PM   Anticoagulation Monitoring   INR  2.90  2.60  3.50 --   INR Date  3/14/2025  3/21/2025  3/28/2025    INR Goal  3.0-3.5  3.0-3.5  3.0-3.5 3.0-3.5   Trend  Same  Same  Same Same   Last Week Total  27.5 mg  27.5 mg  30 mg 27.5 mg   Next Week Total  27.5 mg  30 mg  27.5 mg 27.5 mg   Sun  5 mg  5 mg  5 mg -   Mon  2.5 mg  2.5 mg  2.5 mg 2.5 mg   Tue  5 mg  5 mg  5 mg 5 mg   Wed  2.5 mg  2.5 mg  2.5 mg 2.5 mg   Thu  5 mg  5 mg  5 mg 5 mg   Fri  2.5 mg  5 mg (3/21)  2.5 mg -   Sat  5 mg  5 mg  5 mg -   Historical INR 2.90      2.60      3.50         Visit Report Report Report            This result is from an external source.     Plan:  1. INR is  unknown  today- see above in Anticoagulation Summary.   Will instruct LAURA Sequeira to Continue their warfarin regimen- see above in Anticoagulation Summary.  2. Follow up in 4 days as previously planned, at which time we will review the following bridging plan (wt 59.9 kg; CrCl 25.5 mL/min):     4/16/25   HOLD warfarin  4/17/25   HOLD warfarin; enoxaparin 60 mg every 24 hours in evening  4/18/25   HOLD warfarin; enoxaparin 60 mg every 24 hours in evening   4/19/25   HOLD warfarin; enoxaparin 60 mg every 24 hours in evening (at least 36 hours prior to procedure)  4/20/25   HOLD warfarin; HOLD enoxaparin  4/21/25   PROCEDURE; Resume warfarin 7.5 mg if cleared by surgeon  4/22/25  warfarin 7.5 mg; enoxaparin 60 mg every 24 hours  4/23/25  warfarin 5 mg; enoxaparin 60 mg every 24 hours  4/24/25  warfarin 5 mg; enoxaparin 60 mg every 24 hours  4/25/25  warfarin 5 mg; enoxaparin 60 mg every 24 hours; INR CHECK to determine further plan    3. They have  been instructed to call if any changes in medications, doses, concerns, etc. Patient expresses understanding and has no further questions at this time.    Vasquez Niño, VillaD

## 2025-04-04 ENCOUNTER — ANTICOAGULATION VISIT (OUTPATIENT)
Dept: PHARMACY | Facility: HOSPITAL | Age: 89
End: 2025-04-04
Payer: COMMERCIAL

## 2025-04-04 DIAGNOSIS — I63.411 CEREBROVASCULAR ACCIDENT (CVA) DUE TO EMBOLISM OF RIGHT MIDDLE CEREBRAL ARTERY: ICD-10-CM

## 2025-04-04 DIAGNOSIS — Z95.4 HISTORY OF AORTIC VALVE REPLACEMENT WITH METALLIC VALVE: Primary | ICD-10-CM

## 2025-04-04 LAB — INR PPP: 3.5

## 2025-04-04 PROCEDURE — G0249 PROVIDE INR TEST MATER/EQUIP: HCPCS

## 2025-04-04 RX ORDER — ENOXAPARIN SODIUM 100 MG/ML
60 INJECTION SUBCUTANEOUS
Qty: 6 ML | Refills: 0 | Status: SHIPPED | OUTPATIENT
Start: 2025-04-04

## 2025-04-04 NOTE — PROGRESS NOTES
Anticoagulation Clinic Progress Note    Anticoagulation Summary  As of 4/4/2025      INR goal:  3.0-3.5   TTR:  62.6% (6.2 y)   INR used for dosing:  3.50 (4/4/2025)   Warfarin maintenance plan:  2.5 mg every Mon, Wed, Fri; 5 mg all other days   Weekly warfarin total:  27.5 mg   Plan last modified:  Vasquez Niño, PharmD (4/4/2025)   Next INR check:  4/11/2025   Priority:  High   Target end date:  Indefinite    Indications    History of aortic valve replacement with metallic valve [Z95.4]  Atrial fibrillation [I48.91]  Cerebrovascular accident (CVA) due to 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]                 Anticoagulation Episode Summary       INR check location:  --    Preferred lab:  --    Send INR reminders to:  [facility]    Comments:  **Home testing training 3/3/23** *CALL EVERY TIME* New INR goal 3 - 3.5 (see 1/2020 hospitalization for CVA)          Anticoagulation Care Providers       Provider Role Specialty Phone number    Griffin Fried MD Referring Cardiology [phone]            Clinic Interview:  Patient Findings     Positives:  Upcoming dental procedure    Negatives:  Signs/symptoms of thrombosis, Signs/symptoms of bleeding,   Laboratory test error suspected, Change in health, Change in alcohol use,   Change in activity, Upcoming invasive procedure, Emergency department   visit, Missed doses, Extra doses, Change in medications, Change in   diet/appetite, Hospital admission, Bruising, Other complaints    Comments:  Dental extraction (8 teeth) scheduled for 4/21/25. Cardiology   recommended enoxaparin bridge (refer to 2/3/25), as warfarin is required   to be interrupted by oral surgeon.       Clinical Outcomes     Negatives:  Major bleeding event, Thromboembolic event,   Anticoagulation-related hospital admission, Anticoagulation-related ED   visit, Anticoagulation-related fatality    Comments:  Dental extraction (8 teeth) scheduled for 4/21/25. Cardiology    recommended enoxaparin bridge (refer to 2/3/25), as warfarin is required   to be interrupted by oral surgeon.         INR History:      3/21/2025    12:00 AM 3/21/2025     8:41 AM 3/28/2025    12:00 AM 3/28/2025     8:23 AM 3/31/2025    12:24 PM 4/4/2025    12:00 AM 4/4/2025     8:30 AM   Anticoagulation Monitoring   INR  2.60  3.50 --  3.50   INR Date  3/21/2025  3/28/2025   4/4/2025   INR Goal  3.0-3.5  3.0-3.5 3.0-3.5  3.0-3.5   Trend  Same  Same Same  Same   Last Week Total  27.5 mg  30 mg 27.5 mg  27.5 mg   Next Week Total  30 mg  27.5 mg 27.5 mg  27.5 mg   Sun  5 mg  5 mg -  5 mg   Mon  2.5 mg  2.5 mg 2.5 mg  2.5 mg   Tue  5 mg  5 mg 5 mg  5 mg   Wed  2.5 mg  2.5 mg 2.5 mg  2.5 mg   Thu  5 mg  5 mg 5 mg  5 mg   Fri  5 mg (3/21)  2.5 mg -  2.5 mg   Sat  5 mg  5 mg -  5 mg   Historical INR 2.60      3.50       3.50            This result is from an external source.       Plan:  1. INR is Therapeutic today- see above in Anticoagulation Summary.   Will instruct LAURA Sequeira to Continue their warfarin regimen- see above in Anticoagulation Summary.    In light of his upcoming dental extractions, reviewed bridging plan and prescribed enoxaparin 60 mg every 24 hours (wt 59.9 kg; CrCl 25.5 mL/min). Emailed calendar/how-to administer enoxaparin guide. They are familiar and comfortable with enoxaparin use.     Perioperative Anticoag Instructions:   4/16/25   HOLD warfarin  4/17/25   HOLD warfarin; enoxaparin 60 mg every 24 hours in evening  4/18/25   HOLD warfarin; enoxaparin 60 mg every 24 hours in evening   4/19/25   HOLD warfarin; enoxaparin 60 mg every 24 hours in evening (at least 36 hours prior to procedure)  4/20/25   HOLD warfarin; HOLD enoxaparin  4/21/25   PROCEDURE; Resume warfarin 7.5 mg if cleared by surgeon  4/22/25  warfarin 7.5 mg; enoxaparin 60 mg every 24 hours  4/23/25  warfarin 5 mg; enoxaparin 60 mg every 24 hours  4/24/25  warfarin 5 mg; enoxaparin 60 mg every 24 hours  4/25/25  warfarin 5 mg;  enoxaparin 60 mg every 24 hours; INR CHECK to determine further plan    2. Follow up in 1 week.   3. They have been instructed to call if any changes in medications, doses, concerns, etc. Patient expresses understanding and has no further questions at this time.    Vasquez Niño, PharmD

## 2025-04-11 ENCOUNTER — ANTICOAGULATION VISIT (OUTPATIENT)
Dept: PHARMACY | Facility: HOSPITAL | Age: 89
End: 2025-04-11
Payer: COMMERCIAL

## 2025-04-11 DIAGNOSIS — Z95.4 HISTORY OF AORTIC VALVE REPLACEMENT WITH METALLIC VALVE: Primary | ICD-10-CM

## 2025-04-11 DIAGNOSIS — I63.411 CEREBROVASCULAR ACCIDENT (CVA) DUE TO EMBOLISM OF RIGHT MIDDLE CEREBRAL ARTERY: ICD-10-CM

## 2025-04-11 LAB — INR PPP: 3.2

## 2025-04-11 NOTE — PROGRESS NOTES
Anticoagulation Clinic Progress Note    Anticoagulation Summary  As of 4/11/2025      INR goal:  3.0-3.5   TTR:  62.7% (6.2 y)   INR used for dosing:  3.20 (4/11/2025)   Warfarin maintenance plan:  2.5 mg every Mon, Wed, Fri; 5 mg all other days   Weekly warfarin total:  27.5 mg   No change documented:  Michelle Piña RPH   Plan last modified:  Vasquez Niño, PharmD (4/4/2025)   Next INR check:  4/25/2025   Priority:  High   Target end date:  Indefinite    Indications    History of aortic valve replacement with metallic valve [Z95.4]  Atrial fibrillation [I48.91]  Cerebrovascular accident (CVA) due to 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]                 Anticoagulation Episode Summary       INR check location:  --    Preferred lab:  --    Send INR reminders to:   [facility]    Comments:  **Home testing training 3/3/23** *CALL EVERY TIME* New INR goal 3 - 3.5 (see 1/2020 hospitalization for CVA)          Anticoagulation Care Providers       Provider Role Specialty Phone number    Griffin Fried MD Referring Cardiology [phone]            Clinic Interview:  Patient Findings     Positives:  Upcoming invasive procedure    Negatives:  Signs/symptoms of thrombosis, Signs/symptoms of bleeding,   Laboratory test error suspected, Change in health, Change in alcohol use,   Change in activity, Emergency department visit, Upcoming dental procedure,   Missed doses, Extra doses, Change in medications, Change in diet/appetite,   Hospital admission, Bruising, Other complaints      Clinical Outcomes     Negatives:  Major bleeding event, Thromboembolic event,   Anticoagulation-related hospital admission, Anticoagulation-related ED   visit, Anticoagulation-related fatality        INR History:      3/28/2025    12:00 AM 3/28/2025     8:23 AM 3/31/2025    12:24 PM 4/4/2025    12:00 AM 4/4/2025     8:30 AM 4/11/2025    12:00 AM 4/11/2025     8:46 AM   Anticoagulation Monitoring   INR  3.50 --   3.50  3.20   INR Date  3/28/2025   4/4/2025  4/11/2025   INR Goal  3.0-3.5 3.0-3.5  3.0-3.5  3.0-3.5   Trend  Same Same  Same  Same   Last Week Total  30 mg 27.5 mg  27.5 mg  27.5 mg   Next Week Total  27.5 mg 27.5 mg  27.5 mg  20 mg   Sun  5 mg -  5 mg  Hold (4/20); Otherwise 5 mg   Mon  2.5 mg 2.5 mg  2.5 mg  7.5 mg (4/21); Otherwise 2.5 mg   Tue  5 mg 5 mg  5 mg  7.5 mg (4/22); Otherwise 5 mg   Wed  2.5 mg 2.5 mg  2.5 mg  Hold (4/16), 5 mg (4/23)   Thu  5 mg 5 mg  5 mg  Hold (4/17); Otherwise 5 mg   Fri  2.5 mg -  2.5 mg  Hold (4/18); Otherwise 2.5 mg   Sat  5 mg -  5 mg  Hold (4/19); Otherwise 5 mg   Historical INR 3.50       3.50      3.20            This result is from an external source.       Plan:  1. INR is Supratherapeutic today- see above in Anticoagulation Summary.   Will instruct LAURA Sequeira to Change their warfarin regimen- see above in Anticoagulation Summary.    4/16/25   HOLD warfarin  4/17/25   HOLD warfarin; enoxaparin 60 mg every 24 hours in evening  4/18/25   HOLD warfarin; enoxaparin 60 mg every 24 hours in evening   4/19/25   HOLD warfarin; enoxaparin 60 mg every 24 hours in evening (at least 36 hours prior to procedure)  4/20/25   HOLD warfarin; HOLD enoxaparin  4/21/25   PROCEDURE; Resume warfarin 7.5 mg if cleared by surgeon  4/22/25  warfarin 7.5 mg; enoxaparin 60 mg every 24 hours  4/23/25  warfarin 5 mg; enoxaparin 60 mg every 24 hours  4/24/25  warfarin 5 mg; enoxaparin 60 mg every 24 hours  4/25/25  warfarin 5 mg; enoxaparin 60 mg every 24 hours; INR CHECK to determine further plan    2. Follow up in 2 weeks  3. They have been instructed to call if any changes in medications, doses, concerns, etc. Patient expresses understanding and has no further questions at this time.    Michelle Piña, [facility]

## 2025-04-25 ENCOUNTER — ANTICOAGULATION VISIT (OUTPATIENT)
Dept: PHARMACY | Facility: HOSPITAL | Age: 89
End: 2025-04-25
Payer: COMMERCIAL

## 2025-04-25 DIAGNOSIS — Z95.4 HISTORY OF AORTIC VALVE REPLACEMENT WITH METALLIC VALVE: Primary | ICD-10-CM

## 2025-04-25 DIAGNOSIS — I63.411 CEREBROVASCULAR ACCIDENT (CVA) DUE TO EMBOLISM OF RIGHT MIDDLE CEREBRAL ARTERY: ICD-10-CM

## 2025-04-25 LAB — INR PPP: 2

## 2025-04-25 NOTE — PROGRESS NOTES
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]                 Anticoagulation Episode Summary       INR check location:  --    Preferred lab:  --    Send INR reminders to:  [facility]    Comments:  **Home testing training 3/3/23** *CALL EVERY TIME* New INR goal 3 - 3.5 (see 2020 hospitalization for CVA)          Anticoagulation Care Providers       Provider Role Specialty Phone number    Griffin Fried MD Referring Cardiology [phone]            Clinic Interview:  Patient Findings     Negatives:  Signs/symptoms of thrombosis, Signs/symptoms of bleeding,   Laboratory test error suspected, Change in health, Change in alcohol use,   Change in activity, Upcoming invasive procedure, Emergency department   visit, Upcoming dental procedure, Missed doses, Extra doses, Change in   medications, Change in diet/appetite, Hospital admission, Bruising, Other   complaints      Clinical Outcomes     Negatives:  Major bleeding event, Thromboembolic event,   Anticoagulation-related hospital admission, Anticoagulation-related ED   visit, Anticoagulation-related fatality        INR History:      3/31/2025    12:24 PM 2025    12:00 AM 2025     8:30 AM 2025    12:00 AM 2025     8:46 AM 2025    12:00 AM 2025     9:36 AM   Anticoagulation Monitoring   INR --  3.50  3.20  2.00   INR Date   2025    INR Goal 3.0-3.5  3.0-3.5  3.0-3.5  3.0-3.5   Trend Same  Same  Same  Same   Last Week Total 27.5 mg  27.5 mg  27.5 mg  25 mg   Next Week Total 27.5 mg  27.5 mg  20 mg  30 mg   Sun -  5 mg  Hold (4/20); Otherwise 5 mg  5 mg   Mon 2.5 mg  2.5 mg  7.5 mg (4/21); Otherwise 2.5 mg  -   Tue 5 mg  5 mg  7.5 mg (4/22); Otherwise 5 mg  -   Wed 2.5 mg  2.5 mg  Hold (4/16), 5 mg (4/23)  -   Thu 5 mg  5 mg  Hold (4/17); Otherwise 5 mg  -   Fri -  2.5 mg  Hold (4/18); Otherwise 2.5 mg  5 mg (4/25)   Sat -  5 mg  Hold (4/19); Otherwise 5 mg  5 mg   Historical INR  3.50      3.20      2.00            This result is from an external source.       Plan:  1. INR is Subtherapeutic today- see above in Anticoagulation Summary.   Will instruct LARUA Sequeira to Increase their warfarin regimen- see above in Anticoagulation Summary.   boost 1 more day to 5 mg, continue enoxaparin 60 mg every 24 hours rck on Monday   2. Follow up on Monday   3. They have been instructed to call if any changes in medications, doses, concerns, etc. Patient expresses understanding and has no further questions at this time.    Michelle Piña [facility]

## 2025-04-28 ENCOUNTER — ANTICOAGULATION VISIT (OUTPATIENT)
Dept: PHARMACY | Facility: HOSPITAL | Age: 89
End: 2025-04-28
Payer: COMMERCIAL

## 2025-04-28 DIAGNOSIS — Z95.4 HISTORY OF AORTIC VALVE REPLACEMENT WITH METALLIC VALVE: Primary | ICD-10-CM

## 2025-04-28 DIAGNOSIS — I63.411 CEREBROVASCULAR ACCIDENT (CVA) DUE TO EMBOLISM OF RIGHT MIDDLE CEREBRAL ARTERY: ICD-10-CM

## 2025-04-28 LAB — INR PPP: 2.5

## 2025-04-28 NOTE — PROGRESS NOTES
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]                 Anticoagulation Episode Summary       INR check location:  --    Preferred lab:  --    Send INR reminders to:  [facility]    Comments:  **Home testing training 3/3/23** *CALL EVERY TIME* New INR goal 3 - 3.5 (see 2020 hospitalization for CVA)          Anticoagulation Care Providers       Provider Role Specialty Phone number    Griffin Fried MD Referring Cardiology [phone]            Clinic Interview:  Patient Findings     Positives:  Other complaints    Negatives:  Signs/symptoms of thrombosis, Signs/symptoms of bleeding,   Laboratory test error suspected, Change in health, Change in alcohol use,   Change in activity, Upcoming invasive procedure, Emergency department   visit, Upcoming dental procedure, Missed doses, Extra doses, Change in   medications, Change in diet/appetite, Hospital admission, Bruising    Comments:  As noted previously, his INR is rising following holding   warfarin prior to dental extractions 25. Reports he finished his   supply of enoxaparin yesterday.       Clinical Outcomes     Negatives:  Major bleeding event, Thromboembolic event,   Anticoagulation-related hospital admission, Anticoagulation-related ED   visit, Anticoagulation-related fatality    Comments:  As noted previously, his INR is rising following holding   warfarin prior to  dental extractions 4/21/25. Reports he finished his   supply of enoxaparin yesterday.         INR History:      4/4/2025     8:30 AM 4/11/2025    12:00 AM 4/11/2025     8:46 AM 4/25/2025    12:00 AM 4/25/2025     9:36 AM 4/28/2025    12:00 AM 4/28/2025     9:31 AM   Anticoagulation Monitoring   INR 3.50  3.20  2.00  2.50   INR Date 4/4/2025 4/11/2025 4/25/2025 4/28/2025   INR Goal 3.0-3.5  3.0-3.5  3.0-3.5  3.0-3.5   Trend Same  Same  Same  Same   Last Week Total 27.5 mg  27.5 mg  25 mg  40 mg   Next Week Total 27.5 mg  20 mg  30 mg  32.5 mg   Sun 5 mg  Hold (4/20); Otherwise 5 mg  5 mg  -   Mon 2.5 mg  7.5 mg (4/21); Otherwise 2.5 mg  -  7.5 mg (4/28)   Tue 5 mg  7.5 mg (4/22); Otherwise 5 mg  -  5 mg   Wed 2.5 mg  Hold (4/16), 5 mg (4/23)  -  2.5 mg   Thu 5 mg  Hold (4/17); Otherwise 5 mg  -  5 mg   Fri 2.5 mg  Hold (4/18); Otherwise 2.5 mg  5 mg (4/25)  -   Sat 5 mg  Hold (4/19); Otherwise 5 mg  5 mg  -   Historical INR  3.20      2.00      2.50            This result is from an external source.       Plan:  1. INR is Subtherapeutic today- see above in Anticoagulation Summary.   Will instruct LAURA Sequeira to Change their warfarin regimen (7.5 mg today, then resume 2.5 mg MWF, 5 mg all other days) - see above in Anticoagulation Summary.  2. Follow up in 4 days.   3. They have been instructed to call if any changes in medications, doses, concerns, etc. Patient expresses understanding and has no further questions at this time.    Vasquez Niño, PharmD

## 2025-04-29 RX ORDER — ATORVASTATIN CALCIUM 40 MG/1
40 TABLET, FILM COATED ORAL DAILY
Qty: 90 TABLET | Refills: 0 | Status: SHIPPED | OUTPATIENT
Start: 2025-04-29

## 2025-05-02 ENCOUNTER — ANTICOAGULATION VISIT (OUTPATIENT)
Dept: PHARMACY | Facility: HOSPITAL | Age: 89
End: 2025-05-02
Payer: COMMERCIAL

## 2025-05-02 DIAGNOSIS — I63.411 CEREBROVASCULAR ACCIDENT (CVA) DUE TO EMBOLISM OF RIGHT MIDDLE CEREBRAL ARTERY: ICD-10-CM

## 2025-05-02 DIAGNOSIS — Z95.4 HISTORY OF AORTIC VALVE REPLACEMENT WITH METALLIC VALVE: Primary | ICD-10-CM

## 2025-05-02 LAB — INR PPP: 3.2

## 2025-05-02 PROCEDURE — G0249 PROVIDE INR TEST MATER/EQUIP: HCPCS

## 2025-05-02 NOTE — PROGRESS NOTES
Anticoagulation Clinic Progress Note    Anticoagulation Summary  As of 5/2/2025      INR goal:  3.0-3.5   TTR:  62.3% (6.3 y)   INR used for dosing:  3.20 (5/2/2025)   Warfarin maintenance plan:  2.5 mg every Mon, Wed, Fri; 5 mg all other days   Weekly warfarin total:  27.5 mg   No change documented:  Michelle Piña RPH   Plan last modified:  Vasquez Niño, PharmD (4/4/2025)   Next INR check:  5/9/2025   Priority:  High   Target end date:  Indefinite    Indications    History of aortic valve replacement with metallic valve [Z95.4]  Atrial fibrillation [I48.91]  Cerebrovascular accident (CVA) due to 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]                 Anticoagulation Episode Summary       INR check location:  --    Preferred lab:  --    Send INR reminders to:   [facility]    Comments:  **Home testing training 3/3/23** *CALL EVERY TIME* New INR goal 3 - 3.5 (see 1/2020 hospitalization for CVA)          Anticoagulation Care Providers       Provider Role Specialty Phone number    Griffin Fried MD Referring Cardiology [phone]            Clinic Interview:  Patient Findings     Negatives:  Signs/symptoms of thrombosis, Signs/symptoms of bleeding,   Laboratory test error suspected, Change in health, Change in alcohol use,   Change in activity, Upcoming invasive procedure, Emergency department   visit, Upcoming dental procedure, Missed doses, Extra doses, Change in   medications, Change in diet/appetite, Hospital admission, Bruising, Other   complaints      Clinical Outcomes     Negatives:  Major bleeding event, Thromboembolic event,   Anticoagulation-related hospital admission, Anticoagulation-related ED   visit, Anticoagulation-related fatality        INR History:      4/11/2025     8:46 AM 4/25/2025    12:00 AM 4/25/2025     9:36 AM 4/28/2025    12:00 AM 4/28/2025     9:31 AM 5/2/2025    12:00 AM 5/2/2025     8:15 AM   Anticoagulation Monitoring   INR 3.20  2.00  2.50  3.20    INR Date 4/11/2025 4/25/2025 4/28/2025 5/2/2025   INR Goal 3.0-3.5  3.0-3.5  3.0-3.5  3.0-3.5   Trend Same  Same  Same  Same   Last Week Total 27.5 mg  25 mg  40 mg  35 mg   Next Week Total 20 mg  30 mg  32.5 mg  27.5 mg   Sun Hold (4/20); Otherwise 5 mg  5 mg  -  5 mg   Mon 7.5 mg (4/21); Otherwise 2.5 mg  -  7.5 mg (4/28)  2.5 mg   Tue 7.5 mg (4/22); Otherwise 5 mg  -  5 mg  5 mg   Wed Hold (4/16), 5 mg (4/23)  -  2.5 mg  2.5 mg   Thu Hold (4/17); Otherwise 5 mg  -  5 mg  5 mg   Fri Hold (4/18); Otherwise 2.5 mg  5 mg (4/25)  -  2.5 mg   Sat Hold (4/19); Otherwise 5 mg  5 mg  -  5 mg   Historical INR  2.00      2.50      3.20            This result is from an external source.       Plan:  1. INR is Therapeutic today- see above in Anticoagulation Summary.   Will instruct LAURA BRENNAN Sequeira to Continue their warfarin regimen- see above in Anticoagulation Summary.  2. Follow up in 1 weeks  3. They have been instructed to call if any changes in medications, doses, concerns, etc. Patient expresses understanding and has no further questions at this time.    Michelle Piña [facility]

## 2025-05-05 RX ORDER — EMPAGLIFLOZIN 10 MG/1
10 TABLET, FILM COATED ORAL DAILY
Qty: 90 TABLET | Refills: 1 | Status: SHIPPED | OUTPATIENT
Start: 2025-05-05

## 2025-05-09 ENCOUNTER — ANTICOAGULATION VISIT (OUTPATIENT)
Dept: PHARMACY | Facility: HOSPITAL | Age: 89
End: 2025-05-09
Payer: COMMERCIAL

## 2025-05-09 DIAGNOSIS — Z95.4 HISTORY OF AORTIC VALVE REPLACEMENT WITH METALLIC VALVE: Primary | ICD-10-CM

## 2025-05-09 DIAGNOSIS — I63.411 CEREBROVASCULAR ACCIDENT (CVA) DUE TO EMBOLISM OF RIGHT MIDDLE CEREBRAL ARTERY: ICD-10-CM

## 2025-05-09 LAB — INR PPP: 3

## 2025-05-09 NOTE — PROGRESS NOTES
Anticoagulation Clinic Progress Note    Anticoagulation Summary  As of 5/9/2025      INR goal:  3.0-3.5   TTR:  62.4% (6.3 y)   INR used for dosing:  3.00 (5/9/2025)   Warfarin maintenance plan:  2.5 mg every Mon, Wed, Fri; 5 mg all other days   Weekly warfarin total:  27.5 mg   No change documented:  Vasquez Niño, Pedro   Plan last modified:  Vasquez Niño PharmD (4/4/2025)   Next INR check:  5/16/2025   Priority:  High   Target end date:  Indefinite    Indications    History of aortic valve replacement with metallic valve [Z95.4]  Atrial fibrillation [I48.91]  Cerebrovascular accident (CVA) due to 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]                 Anticoagulation Episode Summary       INR check location:  --    Preferred lab:  --    Send INR reminders to:  [facility]    Comments:  **Home testing training 3/3/23** *CALL EVERY TIME* New INR goal 3 - 3.5 (see 1/2020 hospitalization for CVA)          Anticoagulation Care Providers       Provider Role Specialty Phone number    Griffin Fried MD Referring Cardiology [phone]            Clinic Interview:  Patient Findings     Positives:  Change in diet/appetite    Negatives:  Signs/symptoms of thrombosis, Signs/symptoms of bleeding,   Laboratory test error suspected, Change in health, Change in alcohol use,   Change in activity, Upcoming invasive procedure, Emergency department   visit, Upcoming dental procedure, Missed doses, Extra doses, Change in   medications, Hospital admission, Bruising, Other complaints    Comments:  Reports he is eating less solid food since his recent   extraction of 8 teeth. On 6/5/25, he will be evaluated for dental   implants.      Clinical Outcomes     Negatives:  Major bleeding event, Thromboembolic event,   Anticoagulation-related hospital admission, Anticoagulation-related ED   visit, Anticoagulation-related fatality    Comments:  Reports he is eating less solid food since his recent    extraction of 8 teeth. On 6/5/25, he will be evaluated for dental   implants.        INR History:      4/25/2025     9:36 AM 4/28/2025    12:00 AM 4/28/2025     9:31 AM 5/2/2025    12:00 AM 5/2/2025     8:15 AM 5/9/2025    12:00 AM 5/9/2025     8:20 AM   Anticoagulation Monitoring   INR 2.00  2.50  3.20  3.00   INR Date 4/25/2025 4/28/2025 5/2/2025 5/9/2025   INR Goal 3.0-3.5  3.0-3.5  3.0-3.5  3.0-3.5   Trend Same  Same  Same  Same   Last Week Total 25 mg  40 mg  35 mg  27.5 mg   Next Week Total 30 mg  32.5 mg  27.5 mg  27.5 mg   Sun 5 mg  -  5 mg  5 mg   Mon -  7.5 mg (4/28)  2.5 mg  2.5 mg   Tue -  5 mg  5 mg  5 mg   Wed -  2.5 mg  2.5 mg  2.5 mg   Thu -  5 mg  5 mg  5 mg   Fri 5 mg (4/25)  -  2.5 mg  2.5 mg   Sat 5 mg  -  5 mg  5 mg   Historical INR  2.50      3.20      3.00            This result is from an external source.       Plan:  1. INR is Therapeutic today- see above in Anticoagulation Summary.   Will instruct LAURA BRENNAN Sequeira to Continue their warfarin regimen- see above in Anticoagulation Summary.  2. Follow up in 1 week.  3. They have been instructed to call if any changes in medications, doses, concerns, etc. Patient expresses understanding and has no further questions at this time.    Vasquez Niño, PharmD

## 2025-05-15 DIAGNOSIS — E11.65 TYPE 2 DIABETES MELLITUS WITH HYPERGLYCEMIA, WITH LONG-TERM CURRENT USE OF INSULIN: ICD-10-CM

## 2025-05-15 DIAGNOSIS — Z79.4 TYPE 2 DIABETES MELLITUS WITH HYPERGLYCEMIA, WITH LONG-TERM CURRENT USE OF INSULIN: ICD-10-CM

## 2025-05-15 RX ORDER — SITAGLIPTIN 25 MG/1
25 TABLET, FILM COATED ORAL DAILY
Qty: 90 TABLET | Refills: 1 | Status: SHIPPED | OUTPATIENT
Start: 2025-05-15

## 2025-05-16 ENCOUNTER — ANTICOAGULATION VISIT (OUTPATIENT)
Dept: PHARMACY | Facility: HOSPITAL | Age: 89
End: 2025-05-16
Payer: COMMERCIAL

## 2025-05-16 DIAGNOSIS — I63.411 CEREBROVASCULAR ACCIDENT (CVA) DUE TO EMBOLISM OF RIGHT MIDDLE CEREBRAL ARTERY: ICD-10-CM

## 2025-05-16 DIAGNOSIS — Z95.4 HISTORY OF AORTIC VALVE REPLACEMENT WITH METALLIC VALVE: Primary | ICD-10-CM

## 2025-05-16 LAB — INR PPP: 4.2

## 2025-05-16 NOTE — PROGRESS NOTES
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]                 Anticoagulation Episode Summary       INR check location:  --    Preferred lab:  --    Send INR reminders to:  [facility]    Comments:  **Home testing training 3/3/23** *CALL EVERY TIME* New INR goal 3 - 3.5 (see 2020 hospitalization for CVA)          Anticoagulation Care Providers       Provider Role Specialty Phone number    Griffin Fried MD Referring Cardiology [phone]            Clinic Interview:  Patient Findings     Positives:  Change in diet/appetite    Negatives:  Signs/symptoms of thrombosis, Signs/symptoms of bleeding,   Laboratory test error suspected, Change in health, Change in alcohol use,   Change in activity, Upcoming invasive procedure, Emergency department   visit, Upcoming dental procedure, Missed doses, Extra doses, Change in   medications, Hospital admission, Bruising, Other complaints    Comments:  As noted before, he is eating less solid food (more   liquids/soft foods) since his recent extraction of 8 teeth. On 25, he   will be evaluated for implants.       Clinical Outcomes     Negatives:  Major bleeding event, Thromboembolic event,   Anticoagulation-related hospital admission, Anticoagulation-related ED   visit, Anticoagulation-related fatality    Comments:  As noted before, he is eating less solid food (more   liquids/soft foods) since  his recent extraction of 8 teeth. On 6/5/25, he   will be evaluated for implants.         INR History:      4/28/2025     9:31 AM 5/2/2025    12:00 AM 5/2/2025     8:15 AM 5/9/2025    12:00 AM 5/9/2025     8:20 AM 5/16/2025    12:00 AM 5/16/2025     8:48 AM   Anticoagulation Monitoring   INR 2.50  3.20  3.00  4.20   INR Date 4/28/2025 5/2/2025 5/9/2025 5/16/2025   INR Goal 3.0-3.5  3.0-3.5  3.0-3.5  3.0-3.5   Trend Same  Same  Same  Down   Last Week Total 40 mg  35 mg  27.5 mg  27.5 mg   Next Week Total 32.5 mg  27.5 mg  27.5 mg  25 mg   Sun -  5 mg  5 mg  5 mg   Mon 7.5 mg (4/28)  2.5 mg  2.5 mg  2.5 mg   Tue 5 mg  5 mg  5 mg  -   Wed 2.5 mg  2.5 mg  2.5 mg  -   Thu 5 mg  5 mg  5 mg  -   Fri -  2.5 mg  2.5 mg  2.5 mg   Sat -  5 mg  5 mg  2.5 mg   Historical INR  3.20      3.00      4.20            This result is from an external source.       Plan:  1. INR is Supratherapeutic today- see above in Anticoagulation Summary.   Will instruct LAURA BRENNAN Sequeira to Change their warfarin regimen to 2.5 mg MWFSat, 5 mg all other days - see above in Anticoagulation Summary.  2. Follow up in 4 days.   3. They have been instructed to call if any changes in medications, doses, concerns, etc. Patient expresses understanding and has no further questions at this time.    Vasquez Niño, PharmD

## 2025-05-20 ENCOUNTER — ANTICOAGULATION VISIT (OUTPATIENT)
Dept: PHARMACY | Facility: HOSPITAL | Age: 89
End: 2025-05-20
Payer: COMMERCIAL

## 2025-05-20 DIAGNOSIS — I63.411 CEREBROVASCULAR ACCIDENT (CVA) DUE TO EMBOLISM OF RIGHT MIDDLE CEREBRAL ARTERY: ICD-10-CM

## 2025-05-20 DIAGNOSIS — Z95.4 HISTORY OF AORTIC VALVE REPLACEMENT WITH METALLIC VALVE: Primary | ICD-10-CM

## 2025-05-20 LAB — INR PPP: 3.9

## 2025-05-20 NOTE — PROGRESS NOTES
Anticoagulation Clinic Progress Note    Anticoagulation Summary  As of 5/20/2025      INR goal:  3.0-3.5   TTR:  62.2% (6.3 y)   INR used for dosing:  3.90 (5/20/2025)   Warfarin maintenance plan:  5 mg every Sun, Thu; 2.5 mg all other days   Weekly warfarin total:  22.5 mg   Plan last modified:  Vasquez Niño, PharmD (5/20/2025)   Next INR check:  5/23/2025   Priority:  High   Target end date:  Indefinite    Indications    History of aortic valve replacement with metallic valve [Z95.4]  Atrial fibrillation [I48.91]  Cerebrovascular accident (CVA) due to 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]                 Anticoagulation Episode Summary       INR check location:  --    Preferred lab:  --    Send INR reminders to:  [facility]    Comments:  **Home testing training 3/3/23** *CALL EVERY TIME* New INR goal 3 - 3.5 (see 1/2020 hospitalization for CVA)          Anticoagulation Care Providers       Provider Role Specialty Phone number    Griffin Fried MD Referring Cardiology [phone]            Clinic Interview:  Patient Findings     Positives:  Upcoming dental procedure, Change in diet/appetite    Negatives:  Signs/symptoms of thrombosis, Signs/symptoms of bleeding,   Laboratory test error suspected, Change in health, Change in alcohol use,   Change in activity, Upcoming invasive procedure, Emergency department   visit, Missed doses, Extra doses, Change in medications, Hospital   admission, Bruising, Other complaints    Comments:  As noted before, he is eating less solid food (more   liquids/soft foods) since his recent extraction of 8 teeth. On 6/5/25, he   will be evaluated for implants.       Clinical Outcomes     Negatives:  Major bleeding event, Thromboembolic event,   Anticoagulation-related hospital admission, Anticoagulation-related ED   visit, Anticoagulation-related fatality    Comments:  As noted before, he is eating less solid food (more   liquids/soft foods) since  his recent extraction of 8 teeth. On 6/5/25, he   will be evaluated for implants.         INR History:      5/2/2025     8:15 AM 5/9/2025    12:00 AM 5/9/2025     8:20 AM 5/16/2025    12:00 AM 5/16/2025     8:48 AM 5/20/2025    12:00 AM 5/20/2025     8:26 AM   Anticoagulation Monitoring   INR 3.20  3.00  4.20  3.90   INR Date 5/2/2025 5/9/2025 5/16/2025 5/20/2025   INR Goal 3.0-3.5  3.0-3.5  3.0-3.5  3.0-3.5   Trend Same  Same  Down  Down   Last Week Total 35 mg  27.5 mg  27.5 mg  25 mg   Next Week Total 27.5 mg  27.5 mg  25 mg  22.5 mg   Sun 5 mg  5 mg  5 mg  -   Mon 2.5 mg  2.5 mg  2.5 mg  -   Tue 5 mg  5 mg  -  2.5 mg   Wed 2.5 mg  2.5 mg  -  2.5 mg   Thu 5 mg  5 mg  -  5 mg   Fri 2.5 mg  2.5 mg  2.5 mg  -   Sat 5 mg  5 mg  2.5 mg  -   Historical INR  3.00      4.20      3.90            This result is from an external source.       Plan:  1. INR is Supratherapeutic today- see above in Anticoagulation Summary.   Will instruct LAURA AMIN Hua to Change their warfarin regimen to 5 mg Sun/Thurs, 2.5 mg all other days - see above in Anticoagulation Summary.  2. Follow up in 3 days.   3. They have been instructed to call if any changes in medications, doses, concerns, etc. Patient expresses understanding and has no further questions at this time.    Vasquez Niño, PharmD

## 2025-05-23 ENCOUNTER — ANTICOAGULATION VISIT (OUTPATIENT)
Dept: PHARMACY | Facility: HOSPITAL | Age: 89
End: 2025-05-23
Payer: COMMERCIAL

## 2025-05-23 DIAGNOSIS — Z95.4 HISTORY OF AORTIC VALVE REPLACEMENT WITH METALLIC VALVE: Primary | ICD-10-CM

## 2025-05-23 DIAGNOSIS — I63.411 CEREBROVASCULAR ACCIDENT (CVA) DUE TO EMBOLISM OF RIGHT MIDDLE CEREBRAL ARTERY: ICD-10-CM

## 2025-05-23 LAB — INR PPP: 3

## 2025-05-23 NOTE — PROGRESS NOTES
Anticoagulation Clinic Progress Note    Anticoagulation Summary  As of 5/23/2025      INR goal:  3.0-3.5   TTR:  62.2% (6.3 y)   INR used for dosing:  3.00 (5/23/2025)   Warfarin maintenance plan:  5 mg every Sun, Tue, Thu; 2.5 mg all other days   Weekly warfarin total:  25 mg   Plan last modified:  Vasquez Niño, PharmD (5/23/2025)   Next INR check:  5/30/2025   Priority:  High   Target end date:  Indefinite    Indications    History of aortic valve replacement with metallic valve [Z95.4]  Atrial fibrillation [I48.91]  Cerebrovascular accident (CVA) due to 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]                 Anticoagulation Episode Summary       INR check location:  --    Preferred lab:  --    Send INR reminders to:  [facility]    Comments:  **Home testing training 3/3/23** *CALL EVERY TIME* New INR goal 3 - 3.5 (see 1/2020 hospitalization for CVA)          Anticoagulation Care Providers       Provider Role Specialty Phone number    Griffin Fried MD Referring Cardiology [phone]            Clinic Interview:  Patient Findings     Positives:  Upcoming dental procedure, Change in diet/appetite    Negatives:  Signs/symptoms of thrombosis, Signs/symptoms of bleeding,   Laboratory test error suspected, Change in health, Change in alcohol use,   Change in activity, Upcoming invasive procedure, Emergency department   visit, Missed doses, Extra doses, Change in medications, Hospital   admission, Bruising, Other complaints    Comments:  As noted before, he is eating less solid food (more   liquids/soft foods) since his recent extraction of 8 teeth. On 6/5/25, he   will be evaluated for implants.      Clinical Outcomes     Negatives:  Major bleeding event, Thromboembolic event,   Anticoagulation-related hospital admission, Anticoagulation-related ED   visit, Anticoagulation-related fatality    Comments:  As noted before, he is eating less solid food (more   liquids/soft foods) since  his recent extraction of 8 teeth. On 6/5/25, he   will be evaluated for implants.        INR History:      5/9/2025     8:20 AM 5/16/2025    12:00 AM 5/16/2025     8:48 AM 5/20/2025    12:00 AM 5/20/2025     8:26 AM 5/23/2025    12:00 AM 5/23/2025     8:25 AM   Anticoagulation Monitoring   INR 3.00  4.20  3.90  3.00   INR Date 5/9/2025 5/16/2025 5/20/2025 5/23/2025   INR Goal 3.0-3.5  3.0-3.5  3.0-3.5  3.0-3.5   Trend Same  Down  Down  Up   Last Week Total 27.5 mg  27.5 mg  25 mg  22.5 mg   Next Week Total 27.5 mg  25 mg  22.5 mg  25 mg   Sun 5 mg  5 mg  -  5 mg   Mon 2.5 mg  2.5 mg  -  2.5 mg   Tue 5 mg  -  2.5 mg  5 mg   Wed 2.5 mg  -  2.5 mg  2.5 mg   Thu 5 mg  -  5 mg  5 mg   Fri 2.5 mg  2.5 mg  -  2.5 mg   Sat 5 mg  2.5 mg  -  2.5 mg   Historical INR  4.20      3.90      3.00            This result is from an external source.       Plan:  1. INR is Therapeutic today- see above in Anticoagulation Summary. Concerned that INR may continue to decrease on current warfarin dose.  Will instruct LAURA AMIN Hua to Increase their warfarin regimen to 5 mg Sun/Tues/Thurs, 2.5 mg all other days - see above in Anticoagulation Summary.  2. Follow up in 1 week.  3. They have been instructed to call if any changes in medications, doses, concerns, etc. Patient expresses understanding and has no further questions at this time.    Vasquez Niño, PharmD

## 2025-05-30 ENCOUNTER — ANTICOAGULATION VISIT (OUTPATIENT)
Dept: PHARMACY | Facility: HOSPITAL | Age: 89
End: 2025-05-30
Payer: COMMERCIAL

## 2025-05-30 DIAGNOSIS — I63.411 CEREBROVASCULAR ACCIDENT (CVA) DUE TO EMBOLISM OF RIGHT MIDDLE CEREBRAL ARTERY: ICD-10-CM

## 2025-05-30 DIAGNOSIS — Z95.4 HISTORY OF AORTIC VALVE REPLACEMENT WITH METALLIC VALVE: Primary | ICD-10-CM

## 2025-05-30 LAB — INR PPP: 2.5

## 2025-05-30 PROCEDURE — G0249 PROVIDE INR TEST MATER/EQUIP: HCPCS

## 2025-05-30 NOTE — PROGRESS NOTES
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]                 Anticoagulation Episode Summary       INR check location:  --    Preferred lab:  --    Send INR reminders to:  [facility]    Comments:  **Home testing training 3/3/23** *CALL EVERY TIME* New INR goal 3 - 3.5 (see 2020 hospitalization for CVA)          Anticoagulation Care Providers       Provider Role Specialty Phone number    Griffin Fried MD Referring Cardiology [phone]            Clinic Interview:  Patient Findings     Negatives:  Signs/symptoms of thrombosis, Signs/symptoms of bleeding,   Laboratory test error suspected, Change in health, Change in alcohol use,   Change in activity, Upcoming invasive procedure, Emergency department   visit, Upcoming dental procedure, Missed doses, Extra doses, Change in   medications, Change in diet/appetite, Hospital admission, Bruising, Other   complaints      Clinical Outcomes     Negatives:  Major bleeding event, Thromboembolic event,   Anticoagulation-related hospital admission, Anticoagulation-related ED   visit, Anticoagulation-related fatality        INR History:      2025     8:48 AM 2025    12:00 AM 2025     8:26 AM 2025    12:00 AM 2025     8:25 AM 2025    12:00 AM 2025     8:30 AM   Anticoagulation Monitoring   INR 4.20  3.90  3.00  2.50   INR Date 2025   5/30/2025   INR Goal 3.0-3.5  3.0-3.5  3.0-3.5  3.0-3.5   Trend Down  Down  Up  Same   Last Week Total 27.5 mg  25 mg  22.5 mg  25 mg   Next Week Total 25 mg  22.5 mg  25 mg  27.5 mg   Sun 5 mg  -  5 mg  5 mg   Mon 2.5 mg  -  2.5 mg  2.5 mg   Tue -  2.5 mg  5 mg  5 mg   Wed -  2.5 mg  2.5 mg  2.5 mg   Thu -  5 mg  5 mg  -   Fri 2.5 mg  -  2.5 mg  5 mg (5/30)   Sat 2.5 mg  -  2.5 mg  2.5 mg   Historical INR  3.90      3.00      2.50            This result is from an external source.       Plan:  1. INR is Subtherapeutic today- see above in Anticoagulation Summary.   Will instruct LAURA Sequeira to Increase their warfarin regimen- see above in Anticoagulation Summary.  no changes, still eating less solid food, Boost to 5 mg then resume increased dose of 5 mg on sun, tues, thurs and 2.5 mg AOCARLOS rck by Thursday before dental evaluation.   2. Follow up in 1 weeks  3. They have been instructed to call if any changes in medications, doses, concerns, etc. Patient expresses understanding and has no further questions at this time.    Michelle Piña RP

## 2025-06-06 ENCOUNTER — ANTICOAGULATION VISIT (OUTPATIENT)
Dept: PHARMACY | Facility: HOSPITAL | Age: 89
End: 2025-06-06
Payer: COMMERCIAL

## 2025-06-06 DIAGNOSIS — Z95.4 HISTORY OF AORTIC VALVE REPLACEMENT WITH METALLIC VALVE: Primary | ICD-10-CM

## 2025-06-06 DIAGNOSIS — I63.411 CEREBROVASCULAR ACCIDENT (CVA) DUE TO EMBOLISM OF RIGHT MIDDLE CEREBRAL ARTERY: ICD-10-CM

## 2025-06-06 LAB — INR PPP: 2.8

## 2025-06-06 NOTE — PROGRESS NOTES
Anticoagulation Clinic Progress Note    Anticoagulation Summary  As of 2025      INR goal:  3.0-3.5   TTR:  61.8% (6.4 y)   INR used for dosin.80 (2025)   Warfarin maintenance plan:  2.5 mg every Mon, Wed, Fri; 5 mg all other days   Weekly warfarin total:  27.5 mg   Plan last modified:  Vasquez Niño, PharmD (2025)   Next INR check:  2025   Priority:  High   Target end date:  Indefinite    Indications    History of aortic valve replacement with metallic valve [Z95.4]  Atrial fibrillation [I48.91]  Cerebrovascular accident (CVA) due to 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]                 Anticoagulation Episode Summary       INR check location:  --    Preferred lab:  --    Send INR reminders to:  [facility]    Comments:  **Home testing training 3/3/23** *CALL EVERY TIME* New INR goal 3 - 3.5 (see 2020 hospitalization for CVA)          Anticoagulation Care Providers       Provider Role Specialty Phone number    Griffin Fried MD Referring Cardiology [phone]            Clinic Interview:  Patient Findings     Negatives:  Signs/symptoms of thrombosis, Signs/symptoms of bleeding,   Laboratory test error suspected, Change in health, Change in alcohol use,   Change in activity, Upcoming invasive procedure, Emergency department   visit, Upcoming dental procedure, Missed doses, Extra doses, Change in   medications, Change in diet/appetite, Hospital admission, Bruising, Other   complaints      Clinical Outcomes     Negatives:  Major bleeding event, Thromboembolic event,   Anticoagulation-related hospital admission, Anticoagulation-related ED   visit, Anticoagulation-related fatality        INR History:      2025     8:26 AM 2025    12:00 AM 2025     8:25 AM 2025    12:00 AM 2025     8:30 AM 2025    12:00 AM 2025     9:05 AM   Anticoagulation Monitoring   INR 3.90  3.00  2.50  2.80   INR Date 2025   6/6/2025   INR Goal 3.0-3.5  3.0-3.5  3.0-3.5  3.0-3.5   Trend Down  Up  Same  Up   Last Week Total 25 mg  22.5 mg  25 mg  27.5 mg   Next Week Total 22.5 mg  25 mg  27.5 mg  30 mg   Sun -  5 mg  5 mg  5 mg   Mon -  2.5 mg  2.5 mg  2.5 mg   Tue 2.5 mg  5 mg  5 mg  5 mg   Wed 2.5 mg  2.5 mg  2.5 mg  2.5 mg   Thu 5 mg  5 mg  -  5 mg   Fri -  2.5 mg  5 mg (5/30)  5 mg (6/6)   Sat -  2.5 mg  2.5 mg  5 mg   Historical INR  3.00      2.50      2.80            This result is from an external source.       Plan:  1. INR is Subtherapeutic today- see above in Anticoagulation Summary.   Will instruct LAURA Sequeira to Increase their warfarin regimen (5 mg today, then increase to 2.5 mg MWF, 5 mg all other days) - see above in Anticoagulation Summary.  2. Follow up in 1 week.   3. They have been instructed to call if any changes in medications, doses, concerns, etc. Patient expresses understanding and has no further questions at this time.    Vasquez Niño, PharmD

## 2025-06-10 DIAGNOSIS — I48.20 CHRONIC ATRIAL FIBRILLATION: ICD-10-CM

## 2025-06-13 ENCOUNTER — APPOINTMENT (OUTPATIENT)
Dept: CT IMAGING | Facility: HOSPITAL | Age: 89
End: 2025-06-13
Payer: COMMERCIAL

## 2025-06-13 ENCOUNTER — ANTICOAGULATION VISIT (OUTPATIENT)
Dept: PHARMACY | Facility: HOSPITAL | Age: 89
End: 2025-06-13
Payer: COMMERCIAL

## 2025-06-13 ENCOUNTER — HOSPITAL ENCOUNTER (EMERGENCY)
Facility: HOSPITAL | Age: 89
Discharge: HOME | End: 2025-06-13
Attending: EMERGENCY MEDICINE
Payer: COMMERCIAL

## 2025-06-13 VITALS
HEART RATE: 84 BPM | RESPIRATION RATE: 18 BRPM | TEMPERATURE: 97.7 F | OXYGEN SATURATION: 94 % | DIASTOLIC BLOOD PRESSURE: 61 MMHG | SYSTOLIC BLOOD PRESSURE: 145 MMHG

## 2025-06-13 DIAGNOSIS — Z95.4 HISTORY OF AORTIC VALVE REPLACEMENT WITH METALLIC VALVE: Primary | ICD-10-CM

## 2025-06-13 DIAGNOSIS — I48.21 PERMANENT ATRIAL FIBRILLATION: ICD-10-CM

## 2025-06-13 DIAGNOSIS — I63.411 CEREBROVASCULAR ACCIDENT (CVA) DUE TO EMBOLISM OF RIGHT MIDDLE CEREBRAL ARTERY: ICD-10-CM
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
## 2025-06-13 DIAGNOSIS — S16.1XXA NECK STRAIN, INITIAL ENCOUNTER: ICD-10-CM

## 2025-06-13 DIAGNOSIS — S50.02XA CONTUSION OF LEFT ELBOW, INITIAL ENCOUNTER: ICD-10-CM

## 2025-06-13 LAB
INR PPP: 2.51 (ref 0.9–1.1)
INR PPP: 2.9
PROTHROMBIN TIME: 27.4 SECONDS (ref 11.7–14.2)

## 2025-06-13 PROCEDURE — 72125 CT NECK SPINE W/O DYE: CPT

## 2025-06-13 PROCEDURE — 70450 CT HEAD/BRAIN W/O DYE: CPT

## 2025-06-13 PROCEDURE — 36415 COLL VENOUS BLD VENIPUNCTURE: CPT

## 2025-06-13 PROCEDURE — 99284 EMERGENCY DEPT VISIT MOD MDM: CPT

## 2025-06-13 PROCEDURE — 85610 PROTHROMBIN TIME: CPT | Performed by: EMERGENCY MEDICINE

## 2025-06-13 RX ORDER — DIGOXIN 125 MCG
TABLET ORAL
Qty: 45 TABLET | Refills: 1 | Status: SHIPPED | OUTPATIENT
Start: 2025-06-13
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
## 2025-06-13 NOTE — DISCHARGE INSTRUCTIONS
Expect to be stiff and sore for several days up to 1 to 2 weeks, heat for stiffness and soreness, ice for pain or swelling, what have you normally take for pain you can continue to take, PCP follow-up next week for recheck as needed, ED return for worsening symptoms as needed.

## 2025-06-13 NOTE — PROGRESS NOTES
Anticoagulation Clinic Progress Note    Anticoagulation Summary  As of 2025      INR goal:  3.0-3.5   TTR:  61.7% (6.4 y)   INR used for dosin.90 (2025)   Warfarin maintenance plan:  2.5 mg every Mon, Wed, Fri; 5 mg all other days   Weekly warfarin total:  27.5 mg   Plan last modified:  Vasquez Niño, PharmD (2025)   Next INR check:  2025   Priority:  High   Target end date:  Indefinite    Indications    History of aortic valve replacement with metallic valve [Z95.4]  Atrial fibrillation [I48.91]  Cerebrovascular accident (CVA) due to 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]                 Anticoagulation Episode Summary       INR check location:  --    Preferred lab:  --    Send INR reminders to:  [facility]    Comments:  **Home testing training 3/3/23** *CALL EVERY TIME* New INR goal 3 - 3.5 (see 2020 hospitalization for CVA)          Anticoagulation Care Providers       Provider Role Specialty Phone number    Griffin Fried MD Referring Cardiology [phone]            Clinic Interview:  Patient Findings     Positives:  Upcoming dental procedure    Negatives:  Signs/symptoms of thrombosis, Signs/symptoms of bleeding,   Laboratory test error suspected, Change in health, Change in alcohol use,   Change in activity, Upcoming invasive procedure, Emergency department   visit, Missed doses, Extra doses, Change in medications, Change in   diet/appetite, Hospital admission, Bruising, Other complaints    Comments:  Patient is in the process of getting dental implants.       Clinical Outcomes     Negatives:  Major bleeding event, Thromboembolic event,   Anticoagulation-related hospital admission, Anticoagulation-related ED   visit, Anticoagulation-related fatality    Comments:  Patient is in the process of getting dental implants.         INR History:      2025     8:25 AM 2025    12:00 AM 2025     8:30 AM 2025    12:00 AM 2025     9:05  AM 6/13/2025    12:00 AM 6/13/2025     8:37 AM   Anticoagulation Monitoring   INR 3.00  2.50  2.80  2.90   INR Date 5/23/2025 5/30/2025 6/6/2025 6/13/2025   INR Goal 3.0-3.5  3.0-3.5  3.0-3.5  3.0-3.5   Trend Up  Same  Up  Same   Last Week Total 22.5 mg  25 mg  27.5 mg  30 mg   Next Week Total 25 mg  27.5 mg  30 mg  30 mg   Sun 5 mg  5 mg  5 mg  5 mg   Mon 2.5 mg  2.5 mg  2.5 mg  2.5 mg   Tue 5 mg  5 mg  5 mg  5 mg   Wed 2.5 mg  2.5 mg  2.5 mg  2.5 mg   Thu 5 mg  -  5 mg  5 mg   Fri 2.5 mg  5 mg (5/30)  5 mg (6/6)  5 mg (6/13)   Sat 2.5 mg  2.5 mg  5 mg  5 mg   Historical INR  2.50      2.80      2.90            This result is from an external source.       Plan:  1. INR is Subtherapeutic today- see above in Anticoagulation Summary.   Will instruct LAURA Sequeira to take 5 mg tonight, 6/13/25, and continue their warfarin regimen- see above in Anticoagulation Summary.  2. Follow up in 1 week  3. They have been instructed to call if any changes in medications, doses, concerns, etc. Patient expresses understanding and has no further questions at this time.    Emerald Welch [facility]
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
## 2025-06-20 ENCOUNTER — ANTICOAGULATION VISIT (OUTPATIENT)
Dept: PHARMACY | Facility: HOSPITAL | Age: 89
End: 2025-06-20
Payer: COMMERCIAL

## 2025-06-20 DIAGNOSIS — I63.411 CEREBROVASCULAR ACCIDENT (CVA) DUE TO EMBOLISM OF RIGHT MIDDLE CEREBRAL ARTERY: ICD-10-CM

## 2025-06-20 DIAGNOSIS — Z95.4 HISTORY OF AORTIC VALVE REPLACEMENT WITH METALLIC VALVE: Primary | ICD-10-CM

## 2025-06-20 LAB — INR PPP: 3
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
## 2025-06-22 DIAGNOSIS — I10 ESSENTIAL HYPERTENSION: ICD-10-CM

## 2025-06-23 RX ORDER — METOPROLOL SUCCINATE 25 MG/1
25 TABLET, EXTENDED RELEASE ORAL DAILY
Qty: 90 TABLET | Refills: 1 | Status: SHIPPED | OUTPATIENT
Start: 2025-06-23

## 2025-06-27 ENCOUNTER — ANTICOAGULATION VISIT (OUTPATIENT)
Dept: PHARMACY | Facility: HOSPITAL | Age: 89
End: 2025-06-27
Payer: COMMERCIAL

## 2025-06-27 DIAGNOSIS — I63.411 CEREBROVASCULAR ACCIDENT (CVA) DUE TO EMBOLISM OF RIGHT MIDDLE CEREBRAL ARTERY: ICD-10-CM

## 2025-06-27 DIAGNOSIS — I48.21 PERMANENT ATRIAL FIBRILLATION: ICD-10-CM

## 2025-06-27 DIAGNOSIS — Z95.4 HISTORY OF AORTIC VALVE REPLACEMENT WITH METALLIC VALVE: Primary | ICD-10-CM

## 2025-06-27 LAB — INR PPP: 3.2

## 2025-06-27 PROCEDURE — G0249 PROVIDE INR TEST MATER/EQUIP: HCPCS

## 2025-06-27 NOTE — PROGRESS NOTES
Anticoagulation Clinic Progress Note    Anticoagulation Summary  As of 6/27/2025      INR goal:  3.0-3.5   TTR:  61.6% (6.4 y)   INR used for dosing:  3.20 (6/27/2025)   Warfarin maintenance plan:  2.5 mg every Mon, Wed; 5 mg all other days   Weekly warfarin total:  30 mg   No change documented:  Vasquez Niño, Pedro   Plan last modified:  Vasquez Niño, PharmD (6/20/2025)   Next INR check:  7/4/2025   Priority:  High   Target end date:  Indefinite    Indications    History of aortic valve replacement with metallic valve [Z95.4]  Atrial fibrillation [I48.91]  Cerebrovascular accident (CVA) due to 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]                 Anticoagulation Episode Summary       INR check location:  --    Preferred lab:  --    Send INR reminders to:  [facility] TEST POOL    Comments:  **Home testing training 3/3/23** *CALL EVERY TIME* New INR goal 3 - 3.5 (see 1/2020 hospitalization for CVA)          Anticoagulation Care Providers       Provider Role Specialty Phone number    Griffin Fried MD Referring Cardiology [phone]            Clinic Interview:  Patient Findings     Negatives:  Signs/symptoms of thrombosis, Signs/symptoms of bleeding,   Laboratory test error suspected, Change in health, Change in alcohol use,   Change in activity, Upcoming invasive procedure, Emergency department   visit, Upcoming dental procedure, Missed doses, Extra doses, Change in   medications, Change in diet/appetite, Hospital admission, Bruising, Other   complaints      Clinical Outcomes     Negatives:  Major bleeding event, Thromboembolic event,   Anticoagulation-related hospital admission, Anticoagulation-related ED   visit, Anticoagulation-related fatality        INR History:      6/13/2025     8:37 AM 6/13/2025     1:13 PM 6/20/2025    12:00 AM 6/20/2025     9:46 AM 6/27/2025    12:00 AM 6/27/2025     8:34 AM 6/27/2025     9:00 AM   Anticoagulation Monitoring   INR 2.90   3.00  -- 3.20   INR  Date 6/13/2025 6/20/2025 6/27/2025   INR Goal 3.0-3.5   3.0-3.5  3.0-3.5 3.0-3.5   Trend Same   Up   Same   Last Week Total 30 mg   30 mg   30 mg   Next Week Total 30 mg   30 mg   30 mg   Sun 5 mg   5 mg   5 mg   Mon 2.5 mg   2.5 mg   2.5 mg   Tue 5 mg   5 mg   5 mg   Wed 2.5 mg   2.5 mg   2.5 mg   Thu 5 mg   5 mg   5 mg   Fri 5 mg (6/13)   5 mg   5 mg   Sat 5 mg   5 mg   5 mg   Historical INR  2.51  3.00      3.20             This result is from an external source.       Plan:  1. INR is Therapeutic today- see above in Anticoagulation Summary.   Will instruct LAURA Sequeira to Continue their warfarin regimen at dose of 2.5 mg Mon/Wed, 5 mg all other days - see above in Anticoagulation Summary.  2. Follow up in 1 week.  3. They have been instructed to call if any changes in medications, doses, concerns, etc. Patient expresses understanding and has no further questions at this time.    Vasquez Niño, PharmD

## 2025-07-03 ENCOUNTER — ANTICOAGULATION VISIT (OUTPATIENT)
Dept: PHARMACY | Facility: HOSPITAL | Age: 89
End: 2025-07-03
Payer: COMMERCIAL

## 2025-07-03 DIAGNOSIS — Z95.4 HISTORY OF AORTIC VALVE REPLACEMENT WITH METALLIC VALVE: Primary | ICD-10-CM

## 2025-07-03 DIAGNOSIS — I63.411 CEREBROVASCULAR ACCIDENT (CVA) DUE TO EMBOLISM OF RIGHT MIDDLE CEREBRAL ARTERY: ICD-10-CM

## 2025-07-03 LAB — INR PPP: 3.4

## 2025-07-03 NOTE — PROGRESS NOTES
Anticoagulation Clinic Progress Note    Anticoagulation Summary  As of 7/3/2025      INR goal:  3.0-3.5   TTR:  61.7% (6.5 y)   INR used for dosing:  3.40 (7/3/2025)   Warfarin maintenance plan:  2.5 mg every Mon, Wed; 5 mg all other days   Weekly warfarin total:  30 mg   No change documented:  Vasquez Niño, Pedro   Plan last modified:  Vasquez Niño, PharmD (6/20/2025)   Next INR check:  7/11/2025   Priority:  High   Target end date:  Indefinite    Indications    History of aortic valve replacement with metallic valve [Z95.4]  Atrial fibrillation [I48.91]  Cerebrovascular accident (CVA) due to 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]                 Anticoagulation Episode Summary       INR check location:  --    Preferred lab:  --    Send INR reminders to:  [facility]    Comments:  **Home testing training 3/3/23** *CALL EVERY TIME* New INR goal 3 - 3.5 (see 1/2020 hospitalization for CVA)          Anticoagulation Care Providers       Provider Role Specialty Phone number    Griffin Fried MD Referring Cardiology [phone]            Clinic Interview:  Patient Findings     Negatives:  Signs/symptoms of thrombosis, Signs/symptoms of bleeding,   Laboratory test error suspected, Change in health, Change in alcohol use,   Change in activity, Upcoming invasive procedure, Emergency department   visit, Upcoming dental procedure, Missed doses, Extra doses, Change in   medications, Change in diet/appetite, Hospital admission, Bruising, Other   complaints      Clinical Outcomes     Negatives:  Major bleeding event, Thromboembolic event,   Anticoagulation-related hospital admission, Anticoagulation-related ED   visit, Anticoagulation-related fatality        INR History:      6/20/2025    12:00 AM 6/20/2025     9:46 AM 6/27/2025    12:00 AM 6/27/2025     8:34 AM 6/27/2025     9:00 AM 7/3/2025    12:00 AM 7/3/2025     8:54 AM   Anticoagulation Monitoring   INR  3.00  -- 3.20  3.40   INR Date   6/20/2025   6/27/2025  7/3/2025   INR Goal  3.0-3.5  3.0-3.5 3.0-3.5  3.0-3.5   Trend  Up   Same  Same   Last Week Total  30 mg   30 mg  30 mg   Next Week Total  30 mg   30 mg  30 mg   Sun  5 mg   5 mg  5 mg   Mon  2.5 mg   2.5 mg  2.5 mg   Tue  5 mg   5 mg  5 mg   Wed  2.5 mg   2.5 mg  2.5 mg   Thu  5 mg   5 mg  5 mg   Fri  5 mg   5 mg  5 mg   Sat  5 mg   5 mg  5 mg   Historical INR 3.00      3.20       3.40            This result is from an external source.       Plan:  1. INR is Therapeutic today- see above in Anticoagulation Summary.   Will instruct LAURA Sequeira to Continue their warfarin regimen at dose of 2.5 mg Mon/Wed, 5 mg all other days - see above in Anticoagulation Summary.  2. Follow up in 1 week.  3. They have been instructed to call if any changes in medications, doses, concerns, etc. Patient expresses understanding and has no further questions at this time.    Vasquez Niño, PharmD

## 2025-07-11 ENCOUNTER — OFFICE VISIT (OUTPATIENT)
Dept: INTERNAL MEDICINE | Facility: CLINIC | Age: 89
End: 2025-07-11
Payer: COMMERCIAL

## 2025-07-11 ENCOUNTER — ANTICOAGULATION VISIT (OUTPATIENT)
Dept: PHARMACY | Facility: HOSPITAL | Age: 89
End: 2025-07-11
Payer: COMMERCIAL

## 2025-07-11 VITALS
OXYGEN SATURATION: 96 % | WEIGHT: 124 LBS | DIASTOLIC BLOOD PRESSURE: 70 MMHG | HEART RATE: 71 BPM | SYSTOLIC BLOOD PRESSURE: 122 MMHG | BODY MASS INDEX: 16.8 KG/M2 | HEIGHT: 72 IN | TEMPERATURE: 98 F

## 2025-07-11 DIAGNOSIS — Z91.09 ENVIRONMENTAL ALLERGIES: ICD-10-CM

## 2025-07-11 DIAGNOSIS — Z95.4 HISTORY OF AORTIC VALVE REPLACEMENT WITH METALLIC VALVE: Primary | ICD-10-CM

## 2025-07-11 DIAGNOSIS — E78.5 HYPERLIPIDEMIA, UNSPECIFIED HYPERLIPIDEMIA TYPE: ICD-10-CM

## 2025-07-11 DIAGNOSIS — Z79.4 TYPE 2 DIABETES MELLITUS WITH STAGE 3B CHRONIC KIDNEY DISEASE, WITH LONG-TERM CURRENT USE OF INSULIN: ICD-10-CM

## 2025-07-11 DIAGNOSIS — E11.65 TYPE 2 DIABETES MELLITUS WITH HYPERGLYCEMIA, WITH LONG-TERM CURRENT USE OF INSULIN: ICD-10-CM

## 2025-07-11 DIAGNOSIS — Z79.4 TYPE 2 DIABETES MELLITUS WITH HYPERGLYCEMIA, WITH LONG-TERM CURRENT USE OF INSULIN: ICD-10-CM

## 2025-07-11 DIAGNOSIS — E11.22 TYPE 2 DIABETES MELLITUS WITH STAGE 3B CHRONIC KIDNEY DISEASE, WITH LONG-TERM CURRENT USE OF INSULIN: ICD-10-CM

## 2025-07-11 DIAGNOSIS — Z00.00 MEDICARE ANNUAL WELLNESS VISIT, SUBSEQUENT: Primary | ICD-10-CM

## 2025-07-11 DIAGNOSIS — I63.411 CEREBROVASCULAR ACCIDENT (CVA) DUE TO EMBOLISM OF RIGHT MIDDLE CEREBRAL ARTERY: ICD-10-CM

## 2025-07-11 DIAGNOSIS — Z86.73 HISTORY OF CVA (CEREBROVASCULAR ACCIDENT): ICD-10-CM

## 2025-07-11 DIAGNOSIS — N18.32 TYPE 2 DIABETES MELLITUS WITH STAGE 3B CHRONIC KIDNEY DISEASE, WITH LONG-TERM CURRENT USE OF INSULIN: ICD-10-CM

## 2025-07-11 DIAGNOSIS — R63.6 UNDERWEIGHT: ICD-10-CM

## 2025-07-11 LAB — INR PPP: 3.6

## 2025-07-11 NOTE — PROGRESS NOTES
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]                 Anticoagulation Episode Summary       INR check location:  --    Preferred lab:  --    Send INR reminders to:  [facility]    Comments:  **Home testing training 3/3/23** *CALL EVERY TIME* New INR goal 3 - 3.5 (see 1/2020 hospitalization for CVA)          Anticoagulation Care Providers       Provider Role Specialty Phone number    Griffin Fried MD Referring Cardiology [phone]            Clinic Interview:  Patient Findings     Negatives:  Signs/symptoms of thrombosis, Signs/symptoms of bleeding,   Laboratory test error suspected, Change in health, Change in alcohol use,   Change in activity, Upcoming invasive procedure, Emergency department   visit, Upcoming dental procedure, Missed doses, Extra doses, Change in   medications, Change in diet/appetite, Hospital admission, Bruising, Other   complaints      Clinical Outcomes     Negatives:  Major bleeding event, Thromboembolic event,   Anticoagulation-related hospital admission, Anticoagulation-related ED   visit, Anticoagulation-related fatality        INR History:      6/27/2025    12:00 AM 6/27/2025     8:34 AM 6/27/2025     9:00 AM 7/3/2025    12:00 AM 7/3/2025     8:54 AM 7/11/2025    12:00 AM 7/11/2025    10:14 AM   Anticoagulation Monitoring   INR  -- 3.20  3.40  3.60   INR Date   6/27/2025  7/3/2025  7/11/2025   INR Goal   3.0-3.5 3.0-3.5  3.0-3.5  3.0-3.5   Trend   Same  Same  Same   Last Week Total   30 mg  30 mg  30 mg   Next Week Total   30 mg  30 mg  30 mg   Sun   5 mg  5 mg  5 mg   Mon   2.5 mg  2.5 mg  2.5 mg   Tue   5 mg  5 mg  5 mg   Wed   2.5 mg  2.5 mg  2.5 mg   Thu   5 mg  5 mg  5 mg   Fri   5 mg  5 mg  5 mg   Sat   5 mg  5 mg  5 mg   Historical INR 3.20       3.40      3.60            This result is from an external source.       Plan:  1. INR is Supratherapeutic today- see above in Anticoagulation Summary.   Will instruct LAURA Sequeira to Continue their warfarin regimen- see above in Anticoagulation Summary.  Getting his teeth put in today so will go back to regular eating. Continue 2.5 mg Mon, Wed, 5 mg AOD, rck 1 week  2. Follow up in 1 weeks  3. They have been instructed to call if any changes in medications, doses, concerns, etc. Patient expresses understanding and has no further questions at this time.    Michelle Piña [facility]

## 2025-07-11 NOTE — PROGRESS NOTES
Subjective   The ABCs of the Annual Wellness Visit  Medicare Wellness Visit      LAURA Sequeira is a 89 y.o. patient who presents for a Medicare Wellness Visit.    The following portions of the patient's history were reviewed and   updated as appropriate: allergies, current medications, past family history, past medical history, past social history, past surgical history, and problem list.    Type 2 diabetes/CKD 3b and microalbuminuria: still using VGO 40. Insurance did not cover Farxiga so now on Jardiance (empagliflozin) 10 mg daily. Higher dosages of Jardiance are being avoided due to dehydration.also takes Januvia 25 mg daily.  When he first wakes up in the morning his sugars average 90s-120.  He is no longer requiring any daily extra clicks on the Spotzot system.  He uses a freestyle lisa. He has a Gvoke HypoPen. one episode of low blood sugar, sugar was 70 at that time.  Follows with Dr Dominguez with nephrology .   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      Allergies: takes benadryl twice daily (previously 4 daily), zyrtec and mucinex. Wife states she has tried to take him off benadryl but had nasal drip. Doesn't use any nasal spray.      Kidney carcinoma s/p right nephrectomy in 2006. Did not require chemotherapy or radiation.      Prostate cancer s/p prostate removal with urology in 2006. Follows with [facility] yearly .    Mild vascular dementia: He has had TSH, B12, imaging evaluation already. Had  bad diarrhea with donepezil. Wife states memory can depend on the day and doesn't feel like it is any worse.     Mechanical Aortic valve replacement 2004/Chronic atrial fibrillation/HTN : follows with [facility] and the anticoagulation clinic for warfarin management. Takes digoxin 62.5 mcg daily, metoprolol succinate 25 mg daily.     Wife states he has lost 8 teeth because of dental infection thus has lost weight compared to last visit. Today will be getting dental implants. Patient states it is hard to chew anything, diet limited to oatmeal , jello pudding. He drinks Ensure or Boost one per day or less.     Compared to one year ago, the patient's physical   health is the same.  Compared to one year ago, the patient's mental   health is the same.    Recent Hospitalizations:  He was not admitted to the hospital during the last year.     Current Medical Providers:  Patient Care Team:  Celestine English DO as PCP - General (Internal Medicine)  Audra Vazquez RPH as Pharmacist  Vasquez Niño PharmD as Pharmacist (Pharmacy)  Tatiana Tinoco MD as Consulting Physician (Gastroenterology)  Vida Key APRN as Nurse Practitioner (Nurse Practitioner)    Outpatient Medications Prior to Visit   Medication Sig Dispense Refill    atorvastatin (LIPITOR) 40 MG tablet TAKE 1 TABLET BY MOUTH DAILY 90 tablet 0    cetirizine (zyrTEC) 10 MG tablet Take 1 tablet by mouth Daily.      Continuous Glucose Sensor (FreeStyle Vance 14 Day Sensor) misc Apply 1 each topically Every 14 (Fourteen) Days. 6 each 3    digoxin (LANOXIN) 125 MCG tablet TAKE A HALF TABLET BY MOUTH DAILY 45 tablet 1    empagliflozin (Jardiance) 10 MG tablet tablet TAKE 1 TABLET BY MOUTH DAILY 90 tablet 1    famotidine (PEPCID) 20 MG tablet TAKE 1 TABLET BY MOUTH DAILY 90 tablet 1    guaifenesin-dextromethorphan 600-30 mg (MUCINEX DM)  MG tablet sustained-release 12 hour Take 1 tablet by mouth 2 (Two) Times a Day As Needed (Cough). 20 tablet 0    Insulin  Disposable Pump (V-Go 40) 40 UNIT/24HR kit 1 - DAILY 30 kit 5    Insulin Lispro (humaLOG) 100 UNIT/ML injection INJECT IN V-GO 40  UNITS UNDER THE SKIN OF INSULIN DAILY 30 mL 5    magnesium oxide (MAG-OX) 400 MG tablet Take 1 tablet by mouth 2 (Two) Times a Day.      metoprolol succinate XL (TOPROL-XL) 25 MG 24 hr tablet TAKE 1 TABLET BY MOUTH DAILY 90 tablet 1    SITagliptin (Januvia) 25 MG tablet TAKE 1 TABLET BY MOUTH DAILY 90 tablet 1    warfarin (COUMADIN) 1 MG tablet Take 2 tablets (2 mg) by mouth on Monday Wednesday and Friday or as directed by medication management clinic. 60 tablet 1    warfarin (COUMADIN) 5 MG tablet TAKE 1/2 TABLET BY MOUTH DAILY ON MON, WED, FRI AND TAKE TAKE 1 TABLET BY MOUTH DAILY ON ALL OTHER DAYS 75 tablet 1    enoxaparin sodium (LOVENOX) 60 MG/0.6ML solution prefilled syringe syringe Inject 0.6 mL under the skin into the appropriate area as directed Daily. (Patient not taking: Reported on 7/11/2025) 6 mL 0    Glucagon (Gvoke HypoPen 1-Pack) 1 MG/0.2ML solution auto-injector Inject 1 mg under the skin into the appropriate area as directed 1 (One) Time As Needed (for symptoms of low blood sugar. Report to ER immediately after use.) for up to 1 dose. 0.2 mL 1     No facility-administered medications prior to visit.     No opioid medication identified on active medication list. I have reviewed chart for other potential  high risk medication/s and harmful drug interactions in the elderly.      Aspirin is not on active medication list.  Aspirin use is contraindicated for this patient due to: current use of warfarin.  .    Patient Active Problem List   Diagnosis    Atrial fibrillation    Hypertension    Atopic rhinitis    Gastroesophageal reflux disease    Hyperlipidemia    Type 2 diabetes mellitus    Low testosterone    History of aortic valve replacement with metallic valve    Kidney carcinoma    Stage 3b chronic kidney disease    Cerebrovascular accident (CVA) due to embolism of  "right middle cerebral artery    Iron deficiency anemia    Chronic anticoagulation    Hemiparesis of left nondominant side as late effect of cerebral infarction    Rectus sheath hematoma    Hospital discharge follow-up    Sepsis    Calculus of gallbladder with acute cholecystitis without obstruction    History of Clostridium difficile infection    Aspiration pneumonitis    Hematoma of iliopsoas muscle, left, initial encounter    History of CVA (cerebrovascular accident)    S/P laparoscopic cholecystectomy    Anemia    Hematoma of left iliopsoas muscle    Pneumonia of right lower lobe due to infectious organism    Supratherapeutic INR     Advance Care Planning Advance Directive is not on file.  ACP discussion was held with the patient during this visit. Patient has an advance directive (not in EMR), copy requested.            Objective   Vitals:    07/11/25 1031   BP: 122/70   Pulse: 71   Temp: 98 °F (36.7 °C)   TempSrc: Infrared   SpO2: 96%   Weight: 56.2 kg (124 lb)   Height: 182.9 cm (72\")   PainSc: 0-No pain     Physical Exam  Vitals reviewed.   Constitutional:       General: He is not in acute distress.     Comments: Underweight, frail appearing   Eyes:      General: No scleral icterus.  Cardiovascular:      Rate and Rhythm: Normal rate. Rhythm irregular.      Comments: Mechanical valve click  Pulmonary:      Effort: Pulmonary effort is normal. No respiratory distress.      Breath sounds: No stridor. No wheezing or rhonchi.      Comments: Slightly decreased breath sounds overall  Musculoskeletal:      Right lower leg: No edema.      Left lower leg: No edema.   Skin:     Coloration: Skin is not jaundiced.   Neurological:      Mental Status: He is alert.   Psychiatric:         Mood and Affect: Mood normal.         Behavior: Behavior normal.      Comments: Frequently looks to his wife for answers         Estimated body mass index is 16.82 kg/m² as calculated from the following:    Height as of this encounter: 182.9 " "cm (72\").    Weight as of this encounter: 56.2 kg (124 lb).    BMI is below normal parameters (malnutrition). Recommendations: see plan below            Does the patient have evidence of cognitive impairment? Yes                                                                                               Health  Risk Assessment    Smoking Status:  Social History     Tobacco Use   Smoking Status Former    Current packs/day: 0.00    Average packs/day: 1 pack/day for 25.0 years (25.0 ttl pk-yrs)    Types: Cigarettes    Start date:     Quit date: 1970    Years since quittin.5    Passive exposure: Never   Smokeless Tobacco Former     Alcohol Consumption:  Social History     Substance and Sexual Activity   Alcohol Use Not Currently       Fall Risk Screen  STEADI Fall Risk Assessment was completed, and patient is at MODERATE risk for falls. Assessment completed on:2025    Depression Screening   Little interest or pleasure in doing things? Not at all   Feeling down, depressed, or hopeless? Not at all   PHQ-2 Total Score 0      Health Habits and Functional and Cognitive Screenin/11/2025    10:35 AM   Functional & Cognitive Status   Do you have difficulty preparing food and eating? Yes   Do you have difficulty bathing yourself, getting dressed or grooming yourself? No   Do you have difficulty moving around from place to place? Yes   Do you have trouble with steps or getting out of a bed or a chair? Yes   Current Diet Low Carb Diet   Dental Exam Up to date   Eye Exam Up to date   Exercise (times per week) 0 times per week   Do you need help using the phone?  Yes   Are you deaf or do you have serious difficulty hearing?  Yes   Do you need help to go to places out of walking distance? Yes   Do you need help shopping? Yes   Do you need help preparing meals?  Yes   Do you need help with housework?  No   Do you need help with laundry? No   Do you need help taking your medications? Yes   Do you need help " managing money? No   Do you ever drive or ride in a car without wearing a seat belt? No   Have you felt unusual fatigue (could be tiredness), stress, anger or loneliness in the last month? No   Who do you live with? Sibling   If you need help, do you have trouble finding someone available to you? No   Have you been bothered in the last four weeks by sexual problems? No   Do you have difficulty concentrating, remembering or making decisions? Yes           Age-appropriate Screening Schedule:  Refer to the list below for future screening recommendations based on patient's age, sex and/or medical conditions. Orders for these recommended tests are listed in the plan section. The patient has been provided with a written plan.    Health Maintenance List  Health Maintenance   Topic Date Due    TDAP/TD VACCINES (1 - Tdap) Never done    ZOSTER VACCINE (2 of 2) 02/26/2015    DIABETIC FOOT EXAM  12/05/2017    LIPID PANEL  10/24/2022    COVID-19 Vaccine (7 - 2024-25 season) 03/14/2025    DIABETIC EYE EXAM  08/10/2025    HEMOGLOBIN A1C  09/14/2025    INFLUENZA VACCINE  10/01/2025    URINE MICROALBUMIN-CREATININE RATIO (uACR)  02/28/2026    ANNUAL WELLNESS VISIT  07/11/2026    RSV Vaccine - Adults  Completed    Pneumococcal Vaccine 50+  Completed                                                                                                                                                CMS Preventative Services Quick Reference  Risk Factors Identified During Encounter  Fall Risk-High or Moderate: offered PT referral, declined  Hearing Problem: he wears hearing aids bilaterally.  Immunizations Discussed/Encouraged: Td, Influenza, Shingrix, and COVID19  Inactivity/Sedentary: Patient was advised to exercise at least 150 minutes a week per CDC recommendations.  Polypharmacy: Medication List reviewed  Dental Screening Recommended  Vision Screening Recommended    The above risks/problems have been discussed with the patient.  Pertinent  information has been shared with the patient in the After Visit Summary.  An After Visit Summary and PPPS were made available to the patient.    Follow Up:   Next Medicare Wellness visit to be scheduled in 1 year.     A problem-based visit was also conducted on the same day, see below for assessment and plan    Diagnoses and all orders for this visit:    1. History of CVA (cerebrovascular accident) (Primary)  2. Hyperlipidemia, unspecified hyperlipidemia type  -takes atorvastatin 40 mg daily  Lab Results   Component Value Date    CHOL 80 10/24/2021    CHLPL 147 03/02/2021    TRIG 57 10/24/2021    HDL 38 (L) 10/24/2021    LDL 28 10/24/2021     -last lipid profile in 2021 showed LDL 28. Given his age and comorbidities I would be like to reduce his statin intensity somewhat if his repeat lipid profile today remains below 55. I think a reasonable LDL for him would be below 70.   -     Lipid Panel    3. Underweight  -Wife states he has lost 8 teeth because of dental infection thus has lost weight compared to last visit. Today will be getting dental implants. Patient states it is hard to chew anything, diet limited to oatmeal , jello pudding. He drinks Ensure or Boost one per day or less.   - Weight has decreased from 143 pounds in December 2024 down to 124 pounds today.  He is BMI is 16.8 today.  I discussed with the patient and his wife that he needs to be drinking an Ensure with every meal that is not consumed.  He should be having 3 ensures per day or 3 meals per day.  Check CMP today.    4. Environmental allergies  - takes benadryl twice daily (previously 4 daily), zyrtec and mucinex. Wife states she has tried to take him off benadryl but had nasal drip. Doesn't use any nasal spray.   - We discussed the multiple adverse effects of Benadryl that are possible in his age group and with his comorbidities including memory loss.  I would like for them to continue attempting to reduce Benadryl usage.  Start Astepro nasal  spray over-the-counter and attempt to wean off Benadryl.  5. Type 2 diabetes mellitus with hyperglycemia, with long-term current use of insulin  6. Type 2 diabetes mellitus with stage 3b chronic kidney disease, with long-term current use of insulin  - still using VGO 40. Insurance did not cover Farxiga so now on Jardiance (empagliflozin) 10 mg daily. Higher dosages of Jardiance are being avoided due to dehydration.also takes Januvia 25 mg daily.  When he first wakes up in the morning his sugars average 90s-120.  He is no longer requiring any daily extra clicks on the V-Go system.  He uses a freestyle lisa. He has a Gvoke HypoPen. one episode of low blood sugar, sugar was 70 at that time.  Follows with Dr Dominguez with nephrology .   Lab Results   Component Value Date    HGBA1C 7.50 (H) 03/14/2025       -Last A1c was as above which had shown improvement into normal range.  Given his age and comorbidities, his goal A1c is 7.5 or less.  With use of the V-Go system we have to be very careful about hypoglycemia.  He has only had 1 episode over the last 3 months and it appears to have been mild.  The patient and his family do enjoy the V-Go system and in general wish to continue it if possible.  I will recheck A1c today for continued monitoring.  Check CBC and CMP as well.  -     Hemoglobin A1c  -     CBC & Differential              Follow Up  Return in about 3 months (around 10/11/2025) for Recheck.

## 2025-07-12 LAB
ALBUMIN SERPL-MCNC: 3.7 G/DL (ref 3.5–5.2)
ALBUMIN/GLOB SERPL: 1.2 G/DL
ALP SERPL-CCNC: 120 U/L (ref 39–117)
ALT SERPL-CCNC: 15 U/L (ref 1–41)
AST SERPL-CCNC: 14 U/L (ref 1–40)
BASOPHILS # BLD AUTO: 0.04 10*3/MM3 (ref 0–0.2)
BASOPHILS NFR BLD AUTO: 0.6 % (ref 0–1.5)
BILIRUB SERPL-MCNC: 0.5 MG/DL (ref 0–1.2)
BUN SERPL-MCNC: 32 MG/DL (ref 8–23)
BUN/CREAT SERPL: 17.7 (ref 7–25)
CALCIUM SERPL-MCNC: 9 MG/DL (ref 8.6–10.5)
CHLORIDE SERPL-SCNC: 109 MMOL/L (ref 98–107)
CHOLEST SERPL-MCNC: 129 MG/DL (ref 0–200)
CO2 SERPL-SCNC: 23.9 MMOL/L (ref 22–29)
CREAT SERPL-MCNC: 1.81 MG/DL (ref 0.76–1.27)
EGFRCR SERPLBLD CKD-EPI 2021: 35.7 ML/MIN/1.73
EOSINOPHIL # BLD AUTO: 0.11 10*3/MM3 (ref 0–0.4)
EOSINOPHIL NFR BLD AUTO: 1.7 % (ref 0.3–6.2)
ERYTHROCYTE [DISTWIDTH] IN BLOOD BY AUTOMATED COUNT: 14.7 % (ref 12.3–15.4)
GLOBULIN SER CALC-MCNC: 3.1 GM/DL
GLUCOSE SERPL-MCNC: 115 MG/DL (ref 65–99)
HBA1C MFR BLD: 6.8 % (ref 4.8–5.6)
HCT VFR BLD AUTO: 48.7 % (ref 37.5–51)
HDLC SERPL-MCNC: 44 MG/DL (ref 40–60)
HGB BLD-MCNC: 14.9 G/DL (ref 13–17.7)
IMM GRANULOCYTES # BLD AUTO: 0.01 10*3/MM3 (ref 0–0.05)
IMM GRANULOCYTES NFR BLD AUTO: 0.2 % (ref 0–0.5)
LDLC SERPL CALC-MCNC: 66 MG/DL (ref 0–100)
LYMPHOCYTES # BLD AUTO: 0.68 10*3/MM3 (ref 0.7–3.1)
LYMPHOCYTES NFR BLD AUTO: 10.2 % (ref 19.6–45.3)
MCH RBC QN AUTO: 26.1 PG (ref 26.6–33)
MCHC RBC AUTO-ENTMCNC: 30.6 G/DL (ref 31.5–35.7)
MCV RBC AUTO: 85.3 FL (ref 79–97)
MONOCYTES # BLD AUTO: 0.35 10*3/MM3 (ref 0.1–0.9)
MONOCYTES NFR BLD AUTO: 5.3 % (ref 5–12)
NEUTROPHILS # BLD AUTO: 5.45 10*3/MM3 (ref 1.7–7)
NEUTROPHILS NFR BLD AUTO: 82 % (ref 42.7–76)
NRBC BLD AUTO-RTO: 0 /100 WBC (ref 0–0.2)
PLATELET # BLD AUTO: 174 10*3/MM3 (ref 140–450)
POTASSIUM SERPL-SCNC: 5.3 MMOL/L (ref 3.5–5.2)
PROT SERPL-MCNC: 6.8 G/DL (ref 6–8.5)
RBC # BLD AUTO: 5.71 10*6/MM3 (ref 4.14–5.8)
SODIUM SERPL-SCNC: 142 MMOL/L (ref 136–145)
TRIGL SERPL-MCNC: 99 MG/DL (ref 0–150)
VLDLC SERPL CALC-MCNC: 19 MG/DL (ref 5–40)
WBC # BLD AUTO: 6.64 10*3/MM3 (ref 3.4–10.8)

## 2025-07-18 ENCOUNTER — ANTICOAGULATION VISIT (OUTPATIENT)
Dept: PHARMACY | Facility: HOSPITAL | Age: 89
End: 2025-07-18
Payer: COMMERCIAL

## 2025-07-18 DIAGNOSIS — I63.411 CEREBROVASCULAR ACCIDENT (CVA) DUE TO EMBOLISM OF RIGHT MIDDLE CEREBRAL ARTERY: ICD-10-CM

## 2025-07-18 DIAGNOSIS — Z95.4 HISTORY OF AORTIC VALVE REPLACEMENT WITH METALLIC VALVE: Primary | ICD-10-CM

## 2025-07-18 LAB — INR PPP: 3.4

## 2025-07-18 NOTE — PROGRESS NOTES
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    INR History:      6/27/2025     9:00 AM 7/3/2025    12:00 AM 7/3/2025     8:54 AM 7/11/2025    12:00 AM 7/11/2025    10:14 AM 7/18/2025    12:00 AM 7/18/2025     8:42 AM   Anticoagulation Monitoring   INR 3.20  3.40  3.60  3.40   INR Date 6/27/2025  7/3/2025  7/11/2025  7/18/2025   INR Goal 3.0-3.5  3.0-3.5  3.0-3.5  3.0-3.5   Trend Same  Same  Same  Same   Last Week Total 30 mg  30 mg  30 mg  30 mg   Next Week Total 30 mg  30 mg  30 mg  30 mg   Sun 5 mg  5 mg  5 mg  5 mg   Mon 2.5 mg  2.5 mg  2.5 mg  2.5 mg   Tue 5 mg  5 mg  5 mg  5 mg   Wed 2.5 mg  2.5 mg  2.5 mg  2.5 mg   Thu 5 mg  5 mg  5 mg  5 mg   Fri 5 mg  5 mg  5 mg  5 mg   Sat 5 mg  5 mg  5 mg  5 mg   Historical INR  3.40      3.60      3.40  C       Visit Report    Report Report        C Corrected result    This  result is from an external source.       Plan:  1. INR is therapeutic today- see above in Anticoagulation Summary.    LAURA Sequeira to continue their warfarin regimen- see above in Anticoagulation Summary.  2. Follow up in 1 week  3.  They have been instructed to call if any changes in medications, doses, concerns, etc. Patient expresses understanding and has no further questions at this time.    Michelle Piña, [facility]

## 2025-07-25 ENCOUNTER — ANTICOAGULATION VISIT (OUTPATIENT)
Dept: PHARMACY | Facility: HOSPITAL | Age: 89
End: 2025-07-25
Payer: COMMERCIAL

## 2025-07-25 DIAGNOSIS — I63.411 CEREBROVASCULAR ACCIDENT (CVA) DUE TO EMBOLISM OF RIGHT MIDDLE CEREBRAL ARTERY: ICD-10-CM

## 2025-07-25 DIAGNOSIS — Z95.4 HISTORY OF AORTIC VALVE REPLACEMENT WITH METALLIC VALVE: Primary | ICD-10-CM

## 2025-07-25 LAB — INR PPP: 4.5

## 2025-07-25 PROCEDURE — G0249 PROVIDE INR TEST MATER/EQUIP: HCPCS

## 2025-07-25 NOTE — PROGRESS NOTES
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]                 Anticoagulation Episode Summary       INR check location:  --    Preferred lab:  --    Send INR reminders to:  [facility]    Comments:  **Home testing training 3/3/23** *CALL EVERY TIME* New INR goal 3 - 3.5 (see 2020 hospitalization for CVA)          Anticoagulation Care Providers       Provider Role Specialty Phone number    Griffin Fried MD Referring Cardiology [phone]            Clinic Interview:  Patient Findings     Positives:  Change in diet/appetite    Negatives:  Signs/symptoms of thrombosis, Signs/symptoms of bleeding,   Laboratory test error suspected, Change in health, Change in alcohol use,   Change in activity, Upcoming invasive procedure, Emergency department   visit, Upcoming dental procedure, Missed doses, Extra doses, Change in   medications, Hospital admission, Bruising, Other complaints      Clinical Outcomes     Negatives:  Major bleeding event, Thromboembolic event,   Anticoagulation-related hospital admission, Anticoagulation-related ED   visit, Anticoagulation-related fatality        INR History:      7/3/2025     8:54 AM 2025    12:00 AM 2025    10:14 AM 2025    12:00 AM 2025     8:42 AM 2025    12:00 AM 2025     8:30 AM   Anticoagulation Monitoring   INR 3.40  3.60  3.40  4.50   INR Date 7/3/2025  2025   7/18/2025 7/25/2025   INR Goal 3.0-3.5  3.0-3.5  3.0-3.5  3.0-3.5   Trend Same  Same  Same  Same   Last Week Total 30 mg  30 mg  30 mg  30 mg   Next Week Total 30 mg  30 mg  30 mg  25 mg   Sun 5 mg  5 mg  5 mg  5 mg   Mon 2.5 mg  2.5 mg  2.5 mg  2.5 mg   Tue 5 mg  5 mg  5 mg  5 mg   Wed 2.5 mg  2.5 mg  2.5 mg  2.5 mg   Thu 5 mg  5 mg  5 mg  5 mg   Fri 5 mg  5 mg  5 mg  Hold (7/25)   Sat 5 mg  5 mg  5 mg  5 mg   Historical INR  3.60      3.40  C     4.50        Visit Report  Report Report          C Corrected result    This result is from an external source.       Plan:  1. INR is Supratherapeutic today- see above in Anticoagulation Summary.   Will instruct LAURA Sequeira to Change their warfarin regimen- see above in Anticoagulation Summary.      Switching back and forth from soft to solid foods. Hold and recheck in 1 week   2. Follow up in 1 weeks  3. They have been instructed to call if any changes in medications, doses, concerns, etc. Patient expresses understanding and has no further questions at this time.    Michelle Piña [facility]

## 2025-08-01 ENCOUNTER — ANTICOAGULATION VISIT (OUTPATIENT)
Dept: PHARMACY | Facility: HOSPITAL | Age: 89
End: 2025-08-01
Payer: COMMERCIAL

## 2025-08-01 DIAGNOSIS — Z95.4 HISTORY OF AORTIC VALVE REPLACEMENT WITH METALLIC VALVE: Primary | ICD-10-CM

## 2025-08-01 DIAGNOSIS — I63.411 CEREBROVASCULAR ACCIDENT (CVA) DUE TO EMBOLISM OF RIGHT MIDDLE CEREBRAL ARTERY: ICD-10-CM

## 2025-08-01 LAB — INR PPP: 3.4

## 2025-08-01 RX ORDER — FAMOTIDINE 20 MG/1
20 TABLET, FILM COATED ORAL DAILY
Qty: 90 TABLET | Refills: 1 | Status: SHIPPED | OUTPATIENT
Start: 2025-08-01

## 2025-08-01 RX ORDER — WARFARIN SODIUM 5 MG/1
TABLET ORAL
Qty: 80 TABLET | Refills: 1 | Status: SHIPPED | OUTPATIENT
Start: 2025-08-01

## 2025-08-01 NOTE — PROGRESS NOTES
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]                 Anticoagulation Episode Summary       INR check location:  --    Preferred lab:  --    Send INR reminders to:  [facility]    Comments:  **Home testing training 3/3/23** *CALL EVERY TIME* New INR goal 3 - 3.5 (see 1/2020 hospitalization for CVA)          Anticoagulation Care Providers       Provider Role Specialty Phone number    Griffin Fried MD Referring Cardiology [phone]            Clinic Interview:  Patient Findings     Positives:  Change in diet/appetite    Negatives:  Signs/symptoms of thrombosis, Signs/symptoms of bleeding,   Laboratory test error suspected, Change in health, Change in alcohol use,   Change in activity, Upcoming invasive procedure, Emergency department   visit, Upcoming dental procedure, Missed doses, Extra doses, Change in   medications, Hospital admission, Bruising, Other complaints    Comments:  Reports drinking Boost once daily over the past 1-2 weeks   (previously varied, anywhere from none to 1-2/day).       Clinical Outcomes     Negatives:  Major bleeding event, Thromboembolic event,   Anticoagulation-related hospital admission, Anticoagulation-related ED   visit, Anticoagulation-related fatality    Comments:  Reports drinking Boost once daily over the past 1-2 weeks   (previously varied, anywhere  from none to 1-2/day).         INR History:      7/11/2025    10:14 AM 7/18/2025    12:00 AM 7/18/2025     8:42 AM 7/25/2025    12:00 AM 7/25/2025     8:30 AM 8/1/2025    12:00 AM 8/1/2025     8:10 AM   Anticoagulation Monitoring   INR 3.60  3.40  4.50  3.40   INR Date 7/11/2025 7/18/2025 7/25/2025 8/1/2025   INR Goal 3.0-3.5  3.0-3.5  3.0-3.5  3.0-3.5   Trend Same  Same  Same  Same   Last Week Total 30 mg  30 mg  30 mg  25 mg   Next Week Total 30 mg  30 mg  25 mg  30 mg   Sun 5 mg  5 mg  5 mg  5 mg   Mon 2.5 mg  2.5 mg  2.5 mg  2.5 mg   Tue 5 mg  5 mg  5 mg  5 mg   Wed 2.5 mg  2.5 mg  2.5 mg  2.5 mg   Thu 5 mg  5 mg  5 mg  5 mg   Fri 5 mg  5 mg  Hold (7/25)  5 mg   Sat 5 mg  5 mg  5 mg  5 mg   Historical INR  3.40  C     4.50      3.40        Visit Report Report            C Corrected result    This result is from an external source.       Plan:  1. INR is Therapeutic today- see above in Anticoagulation Summary.   Will instruct LAURA Sequeira to Continue their warfarin regimen at dose of 2.5 Mon/Wed, 5 mg all other days - see above in Anticoagulation Summary.  2. Follow up in 1 week.  3. They have been instructed to call if any changes in medications, doses, concerns, etc. Patient expresses understanding and has no further questions at this time.    Vasquez Niño, PharmD

## 2025-08-08 ENCOUNTER — TELEPHONE (OUTPATIENT)
Dept: INTERNAL MEDICINE | Facility: CLINIC | Age: 89
End: 2025-08-08
Payer: COMMERCIAL

## 2025-08-08 RX ORDER — ATORVASTATIN CALCIUM 40 MG/1
40 TABLET, FILM COATED ORAL DAILY
Qty: 90 TABLET | Refills: 0 | Status: SHIPPED | OUTPATIENT
Start: 2025-08-08

## 2025-08-14 ENCOUNTER — TELEPHONE (OUTPATIENT)
Dept: PHARMACY | Facility: HOSPITAL | Age: 89
End: 2025-08-14
Payer: COMMERCIAL

## 2025-08-15 ENCOUNTER — ANTICOAGULATION VISIT (OUTPATIENT)
Dept: PHARMACY | Facility: HOSPITAL | Age: 89
End: 2025-08-15
Payer: COMMERCIAL

## 2025-08-15 DIAGNOSIS — I63.411 CEREBROVASCULAR ACCIDENT (CVA) DUE TO EMBOLISM OF RIGHT MIDDLE CEREBRAL ARTERY: ICD-10-CM

## 2025-08-15 DIAGNOSIS — Z95.4 HISTORY OF AORTIC VALVE REPLACEMENT WITH METALLIC VALVE: Primary | ICD-10-CM

## 2025-08-15 LAB — INR PPP: 4.9

## 2025-08-22 ENCOUNTER — ANTICOAGULATION VISIT (OUTPATIENT)
Dept: PHARMACY | Facility: HOSPITAL | Age: 89
End: 2025-08-22
Payer: COMMERCIAL

## 2025-08-22 DIAGNOSIS — I63.411 CEREBROVASCULAR ACCIDENT (CVA) DUE TO EMBOLISM OF RIGHT MIDDLE CEREBRAL ARTERY: ICD-10-CM

## 2025-08-22 DIAGNOSIS — Z95.4 HISTORY OF AORTIC VALVE REPLACEMENT WITH METALLIC VALVE: Primary | ICD-10-CM

## 2025-08-22 LAB — INR PPP: 2.9

## 2025-08-23 PROBLEM — S42.009A CLAVICLE FRACTURE: Status: ACTIVE | Noted: 2025-08-23

## 2025-08-24 PROBLEM — M25.511 PAIN IN RIGHT SHOULDER: Status: ACTIVE | Noted: 2025-08-24

## 2025-08-24 PROBLEM — S42.001A CLOSED DISPLACED FRACTURE OF RIGHT CLAVICLE: Status: ACTIVE | Noted: 2025-08-24

## 2025-08-25 ENCOUNTER — TELEPHONE (OUTPATIENT)
Dept: PHARMACY | Facility: HOSPITAL | Age: 89
End: 2025-08-25
Payer: COMMERCIAL

## 2025-08-27 PROBLEM — M25.511 PAIN IN RIGHT SHOULDER: Status: RESOLVED | Noted: 2025-08-24 | Resolved: 2025-08-27

## (undated) DEVICE — ENDOPATH XCEL UNIVERSAL TROCAR STABLILITY SLEEVES: Brand: ENDOPATH XCEL

## (undated) DEVICE — KT ORCA ORCAPOD DISP STRL

## (undated) DEVICE — SNAR POLYP CAPTIVATOR/COLD STFF RND 10MM 240CM

## (undated) DEVICE — CATH CHOLANG 4.5F18IN BRGNDY

## (undated) DEVICE — GLV SURG BIOGEL LTX PF 6 1/2

## (undated) DEVICE — SUT VIC 0/0 UR6 27IN DYED J603H

## (undated) DEVICE — EXTENSION SET, MALE LUER LOCK ADAPTER WITH RETRACTABLE COLLAR

## (undated) DEVICE — STPCK 3WY D201 DISCOFIX

## (undated) DEVICE — MSK PROC CURAPLEX O2 2/ADAPT 7FT

## (undated) DEVICE — THE SINGLE USE ETRAP – POLYP TRAP IS USED FOR SUCTION RETRIEVAL OF ENDOSCOPICALLY REMOVED POLYPS.: Brand: ETRAP

## (undated) DEVICE — ENDOPATH XCEL BLADELESS TROCARS WITH STABILITY SLEEVES: Brand: ENDOPATH XCEL

## (undated) DEVICE — FRCP BX RADJAW4 NDL 2.8 240CM LG OG BX40

## (undated) DEVICE — LN SMPL CO2 SHTRM SD STREAM W/M LUER

## (undated) DEVICE — JACKSON-PRATT 100CC BULB RESERVOIR: Brand: CARDINAL HEALTH

## (undated) DEVICE — ANTIBACTERIAL UNDYED BRAIDED (POLYGLACTIN 910), SYNTHETIC ABSORBABLE SUTURE: Brand: COATED VICRYL

## (undated) DEVICE — Device

## (undated) DEVICE — SENSR O2 OXIMAX FNGR A/ 18IN NONSTR

## (undated) DEVICE — CANN O2 ETCO2 FITS ALL CONN CO2 SMPL A/ 7IN DISP LF

## (undated) DEVICE — ENDOPOUCH RETRIEVER SPECIMEN RETRIEVAL BAGS: Brand: ENDOPOUCH RETRIEVER

## (undated) DEVICE — DRP C/ARM 41X74IN

## (undated) DEVICE — TUBING, SUCTION, 1/4" X 10', STRAIGHT: Brand: MEDLINE

## (undated) DEVICE — KT CLN CLEANOR SCPE

## (undated) DEVICE — ADHS SKIN SURG TISS VISC PREMIERPRO EXOFIN HI/VISC FAST/DRY

## (undated) DEVICE — LOU LAP CHOLE: Brand: MEDLINE INDUSTRIES, INC.

## (undated) DEVICE — APPL CHLORAPREP HI/LITE 26ML ORNG

## (undated) DEVICE — ENDOPATH XCEL BLUNT TIP TROCARS WITH SMOOTH SLEEVES: Brand: ENDOPATH XCEL

## (undated) DEVICE — CATH IV INSYTE AUTOGARD 14G 1 1/2IN ORNG

## (undated) DEVICE — SPNG GZ WOVN 4X4IN 12PLY 10/BX STRL

## (undated) DEVICE — ADAPT CLN BIOGUARD AIR/H2O DISP

## (undated) DEVICE — ENDOCUT SCISSOR TIP, DISPOSABLE: Brand: RENEW

## (undated) DEVICE — PDS II VLT 0 107CM AG ST3: Brand: ENDOLOOP

## (undated) DEVICE — VISUALIZATION SYSTEM: Brand: CLEARIFY

## (undated) DEVICE — CONTAINER,SPECIMEN,OR STERILE,4OZ: Brand: MEDLINE

## (undated) DEVICE — LAPAROSCOPIC SMOKE FILTRATION SYSTEM: Brand: PALL LAPAROSHIELD® PLUS LAPAROSCOPIC SMOKE FILTRATION SYSTEM

## (undated) DEVICE — BITEBLOCK OMNI BLOC

## (undated) DEVICE — DRAPE,REIN 53X77,STERILE: Brand: MEDLINE